# Patient Record
Sex: MALE | Race: WHITE | Employment: OTHER | URBAN - METROPOLITAN AREA
[De-identification: names, ages, dates, MRNs, and addresses within clinical notes are randomized per-mention and may not be internally consistent; named-entity substitution may affect disease eponyms.]

---

## 2017-09-07 PROBLEM — I48.4 ATYPICAL ATRIAL FLUTTER (HCC): Status: ACTIVE | Noted: 2017-09-07

## 2017-09-07 PROBLEM — E66.09 NON MORBID OBESITY DUE TO EXCESS CALORIES: Status: ACTIVE | Noted: 2017-09-07

## 2017-10-03 PROBLEM — E87.5 HYPERKALEMIA: Status: ACTIVE | Noted: 2017-10-03

## 2017-10-03 PROBLEM — I49.5 SINUS NODE DYSFUNCTION (HCC): Status: ACTIVE | Noted: 2017-10-03

## 2018-01-12 PROBLEM — E87.5 HYPERKALEMIA: Status: RESOLVED | Noted: 2017-10-03 | Resolved: 2018-01-12

## 2018-03-19 ENCOUNTER — HOSPITAL ENCOUNTER (OUTPATIENT)
Age: 63
Discharge: HOME | End: 2018-03-19
Payer: COMMERCIAL

## 2018-03-19 DIAGNOSIS — E11.43 TYPE 2 DIABETES MELLITUS WITH DIABETIC AUTONOMIC NEUROPATHY, WITH LONG-TERM CURRENT USE OF INSULIN (HCC): ICD-10-CM

## 2018-03-19 DIAGNOSIS — Z79.4 TYPE 2 DIABETES MELLITUS WITH DIABETIC AUTONOMIC NEUROPATHY, WITH LONG-TERM CURRENT USE OF INSULIN (HCC): ICD-10-CM

## 2018-03-19 LAB
CREATININE URINE: 27 MG/DL (ref 40–278)
MICROALBUMIN UR-MCNC: <12 MG/L
MICROALBUMIN/CREAT UR-RTO: ABNORMAL (ref 0–30)

## 2018-03-19 PROCEDURE — 82044 UR ALBUMIN SEMIQUANTITATIVE: CPT

## 2018-03-19 PROCEDURE — 82570 ASSAY OF URINE CREATININE: CPT

## 2018-04-11 ENCOUNTER — OFFICE VISIT (OUTPATIENT)
Dept: INTERNAL MEDICINE | Age: 63
End: 2018-04-11
Payer: COMMERCIAL

## 2018-04-11 VITALS
TEMPERATURE: 98 F | SYSTOLIC BLOOD PRESSURE: 137 MMHG | DIASTOLIC BLOOD PRESSURE: 62 MMHG | HEIGHT: 67 IN | BODY MASS INDEX: 38.5 KG/M2 | WEIGHT: 245.3 LBS | RESPIRATION RATE: 16 BRPM | HEART RATE: 62 BPM

## 2018-04-11 DIAGNOSIS — J98.4 RESTRICTIVE LUNG DISEASE SECONDARY TO OBESITY: ICD-10-CM

## 2018-04-11 DIAGNOSIS — E66.9 RESTRICTIVE LUNG DISEASE SECONDARY TO OBESITY: ICD-10-CM

## 2018-04-11 DIAGNOSIS — Z79.4 TYPE 2 DIABETES MELLITUS WITH DIABETIC AUTONOMIC NEUROPATHY, WITH LONG-TERM CURRENT USE OF INSULIN (HCC): Primary | ICD-10-CM

## 2018-04-11 DIAGNOSIS — E03.9 ACQUIRED HYPOTHYROIDISM: ICD-10-CM

## 2018-04-11 DIAGNOSIS — E11.43 TYPE 2 DIABETES MELLITUS WITH DIABETIC AUTONOMIC NEUROPATHY, WITH LONG-TERM CURRENT USE OF INSULIN (HCC): Primary | ICD-10-CM

## 2018-04-11 DIAGNOSIS — I10 ESSENTIAL HYPERTENSION: ICD-10-CM

## 2018-04-11 LAB
GLUCOSE BLD-MCNC: 207 MG/DL
HBA1C MFR BLD: 6.5 %

## 2018-04-11 PROCEDURE — 82962 GLUCOSE BLOOD TEST: CPT

## 2018-04-11 PROCEDURE — 99212 OFFICE O/P EST SF 10 MIN: CPT

## 2018-04-11 PROCEDURE — 83036 HEMOGLOBIN GLYCOSYLATED A1C: CPT

## 2018-04-11 PROCEDURE — 99213 OFFICE O/P EST LOW 20 MIN: CPT

## 2018-04-11 RX ORDER — ATORVASTATIN CALCIUM 40 MG/1
40 TABLET, FILM COATED ORAL DAILY
Qty: 30 TABLET | Refills: 3 | Status: SHIPPED | OUTPATIENT
Start: 2018-04-11 | End: 2018-05-18 | Stop reason: SDUPTHER

## 2018-04-11 RX ORDER — FERROUS SULFATE 325(65) MG
325 TABLET ORAL
Qty: 30 TABLET | Refills: 3 | Status: SHIPPED | OUTPATIENT
Start: 2018-04-11 | End: 2019-05-03 | Stop reason: SDUPTHER

## 2018-04-11 RX ORDER — GABAPENTIN 100 MG/1
300 CAPSULE ORAL 2 TIMES DAILY
Qty: 60 CAPSULE | Refills: 3 | Status: SHIPPED | OUTPATIENT
Start: 2018-04-11 | End: 2018-04-11 | Stop reason: CLARIF

## 2018-04-11 RX ORDER — GABAPENTIN 100 MG/1
100 CAPSULE ORAL 2 TIMES DAILY
Qty: 60 CAPSULE | Refills: 3 | Status: SHIPPED | OUTPATIENT
Start: 2018-04-11 | End: 2019-01-22 | Stop reason: SINTOL

## 2018-04-11 RX ORDER — GLUCOSAMINE HCL/CHONDROITIN SU 500-400 MG
CAPSULE ORAL
Qty: 100 STRIP | Refills: 3 | Status: SHIPPED | OUTPATIENT
Start: 2018-04-11 | End: 2018-08-27 | Stop reason: SDUPTHER

## 2018-04-11 RX ORDER — LISINOPRIL 20 MG/1
TABLET ORAL
Qty: 30 TABLET | Refills: 3 | Status: SHIPPED | OUTPATIENT
Start: 2018-04-11 | End: 2018-05-18 | Stop reason: SDUPTHER

## 2018-04-11 RX ORDER — HYDROCHLOROTHIAZIDE 25 MG/1
TABLET ORAL
Qty: 30 TABLET | Refills: 3 | Status: SHIPPED | OUTPATIENT
Start: 2018-04-11 | End: 2018-08-27 | Stop reason: SDUPTHER

## 2018-04-11 RX ORDER — LEVOTHYROXINE SODIUM 0.1 MG/1
TABLET ORAL
Qty: 30 TABLET | Refills: 3 | Status: SHIPPED | OUTPATIENT
Start: 2018-04-11 | End: 2018-08-27 | Stop reason: SDUPTHER

## 2018-04-11 ASSESSMENT — ENCOUNTER SYMPTOMS
SHORTNESS OF BREATH: 0
EYES NEGATIVE: 1
GASTROINTESTINAL NEGATIVE: 1
COUGH: 0

## 2018-04-24 ENCOUNTER — NURSE ONLY (OUTPATIENT)
Dept: NON INVASIVE DIAGNOSTICS | Age: 63
End: 2018-04-24
Payer: COMMERCIAL

## 2018-04-24 ENCOUNTER — TELEPHONE (OUTPATIENT)
Dept: NON INVASIVE DIAGNOSTICS | Age: 63
End: 2018-04-24

## 2018-04-24 DIAGNOSIS — R00.1 BRADYCARDIA: ICD-10-CM

## 2018-04-24 DIAGNOSIS — I48.0 PAROXYSMAL ATRIAL FIBRILLATION (HCC): ICD-10-CM

## 2018-04-24 DIAGNOSIS — Z95.0 PACEMAKER: Primary | ICD-10-CM

## 2018-04-24 PROCEDURE — 93294 REM INTERROG EVL PM/LDLS PM: CPT | Performed by: INTERNAL MEDICINE

## 2018-04-24 PROCEDURE — 93296 REM INTERROG EVL PM/IDS: CPT | Performed by: INTERNAL MEDICINE

## 2018-05-18 DIAGNOSIS — E11.43 TYPE 2 DIABETES MELLITUS WITH DIABETIC AUTONOMIC NEUROPATHY, WITH LONG-TERM CURRENT USE OF INSULIN (HCC): ICD-10-CM

## 2018-05-18 DIAGNOSIS — I10 ESSENTIAL HYPERTENSION: ICD-10-CM

## 2018-05-18 DIAGNOSIS — Z79.4 TYPE 2 DIABETES MELLITUS WITH DIABETIC AUTONOMIC NEUROPATHY, WITH LONG-TERM CURRENT USE OF INSULIN (HCC): ICD-10-CM

## 2018-05-18 RX ORDER — LISINOPRIL 20 MG/1
TABLET ORAL
Qty: 90 TABLET | Refills: 0 | Status: SHIPPED | OUTPATIENT
Start: 2018-05-18 | End: 2018-08-09 | Stop reason: SDUPTHER

## 2018-05-18 RX ORDER — ATORVASTATIN CALCIUM 40 MG/1
40 TABLET, FILM COATED ORAL DAILY
Qty: 90 TABLET | Refills: 0 | Status: SHIPPED | OUTPATIENT
Start: 2018-05-18 | End: 2018-08-27 | Stop reason: SDUPTHER

## 2018-06-22 ENCOUNTER — OFFICE VISIT (OUTPATIENT)
Dept: PULMONOLOGY | Age: 63
End: 2018-06-22
Payer: MEDICARE

## 2018-06-22 VITALS
WEIGHT: 245 LBS | DIASTOLIC BLOOD PRESSURE: 63 MMHG | SYSTOLIC BLOOD PRESSURE: 136 MMHG | HEART RATE: 77 BPM | OXYGEN SATURATION: 95 % | HEIGHT: 67 IN | RESPIRATION RATE: 19 BRPM | BODY MASS INDEX: 38.45 KG/M2 | TEMPERATURE: 98.2 F

## 2018-06-22 DIAGNOSIS — G47.33 OSA (OBSTRUCTIVE SLEEP APNEA): Primary | ICD-10-CM

## 2018-06-22 DIAGNOSIS — J98.4 RESTRICTIVE LUNG DISEASE SECONDARY TO OBESITY: ICD-10-CM

## 2018-06-22 DIAGNOSIS — E66.09 CLASS 1 OBESITY DUE TO EXCESS CALORIES WITH SERIOUS COMORBIDITY IN ADULT, UNSPECIFIED BMI: ICD-10-CM

## 2018-06-22 DIAGNOSIS — E66.9 RESTRICTIVE LUNG DISEASE SECONDARY TO OBESITY: ICD-10-CM

## 2018-06-22 DIAGNOSIS — R91.8 LUNG NODULES: ICD-10-CM

## 2018-06-22 LAB
DLCO %PRED: NORMAL
DLCO/VA %PRED: NORMAL
DLCO: NORMAL ML/MMHG SEC
FEF 25-75% PRE PRED: NORMAL
FEF 25-75%-PRE: 2.87
FEV1 %PRED-PRE: NORMAL
FEV1/FVC PRED: NORMAL
FEV1/FVC: 87 %
FEV1: 2 LITERS
FEV3 PRE: 2.27
FVC %PRED-PRE: NORMAL
FVC: 2.31 LITERS
MEP: NORMAL
MIP: NORMAL
PEF %PRED-PRE: NORMAL
PEF-PRE: NORMAL L/SEC
TLC %PRED: NORMAL
TLC: NORMAL LITERS

## 2018-06-22 PROCEDURE — 3017F COLORECTAL CA SCREEN DOC REV: CPT | Performed by: INTERNAL MEDICINE

## 2018-06-22 PROCEDURE — 1036F TOBACCO NON-USER: CPT | Performed by: INTERNAL MEDICINE

## 2018-06-22 PROCEDURE — G8417 CALC BMI ABV UP PARAM F/U: HCPCS | Performed by: INTERNAL MEDICINE

## 2018-06-22 PROCEDURE — 94010 BREATHING CAPACITY TEST: CPT | Performed by: INTERNAL MEDICINE

## 2018-06-22 PROCEDURE — 99214 OFFICE O/P EST MOD 30 MIN: CPT | Performed by: INTERNAL MEDICINE

## 2018-06-22 PROCEDURE — 99213 OFFICE O/P EST LOW 20 MIN: CPT | Performed by: INTERNAL MEDICINE

## 2018-06-22 PROCEDURE — G8427 DOCREV CUR MEDS BY ELIG CLIN: HCPCS | Performed by: INTERNAL MEDICINE

## 2018-06-22 ASSESSMENT — PULMONARY FUNCTION TESTS
FEV1: 2.00
FEV1/FVC: 87
FVC: 2.31

## 2018-07-11 ENCOUNTER — TELEPHONE (OUTPATIENT)
Dept: INTERNAL MEDICINE | Age: 63
End: 2018-07-11

## 2018-07-12 RX ORDER — MELATONIN
Qty: 30 TABLET | Refills: 0 | Status: SHIPPED | OUTPATIENT
Start: 2018-07-12 | End: 2019-05-03 | Stop reason: SDUPTHER

## 2018-07-25 ENCOUNTER — OFFICE VISIT (OUTPATIENT)
Dept: NON INVASIVE DIAGNOSTICS | Age: 63
End: 2018-07-25
Payer: MEDICARE

## 2018-07-25 VITALS
DIASTOLIC BLOOD PRESSURE: 60 MMHG | HEART RATE: 86 BPM | SYSTOLIC BLOOD PRESSURE: 120 MMHG | HEIGHT: 67 IN | BODY MASS INDEX: 38.3 KG/M2 | WEIGHT: 244 LBS | RESPIRATION RATE: 18 BRPM

## 2018-07-25 DIAGNOSIS — I49.5 SINUS NODE DYSFUNCTION (HCC): ICD-10-CM

## 2018-07-25 DIAGNOSIS — I10 ESSENTIAL HYPERTENSION: ICD-10-CM

## 2018-07-25 DIAGNOSIS — I48.19 PERSISTENT ATRIAL FIBRILLATION (HCC): ICD-10-CM

## 2018-07-25 DIAGNOSIS — Z95.0 PACEMAKER: Primary | ICD-10-CM

## 2018-07-25 PROCEDURE — 1036F TOBACCO NON-USER: CPT | Performed by: INTERNAL MEDICINE

## 2018-07-25 PROCEDURE — 93280 PM DEVICE PROGR EVAL DUAL: CPT | Performed by: INTERNAL MEDICINE

## 2018-07-25 PROCEDURE — 3017F COLORECTAL CA SCREEN DOC REV: CPT | Performed by: INTERNAL MEDICINE

## 2018-07-25 PROCEDURE — G8417 CALC BMI ABV UP PARAM F/U: HCPCS | Performed by: INTERNAL MEDICINE

## 2018-07-25 PROCEDURE — 1123F ACP DISCUSS/DSCN MKR DOCD: CPT | Performed by: INTERNAL MEDICINE

## 2018-07-25 PROCEDURE — G8427 DOCREV CUR MEDS BY ELIG CLIN: HCPCS | Performed by: INTERNAL MEDICINE

## 2018-07-25 PROCEDURE — 1101F PT FALLS ASSESS-DOCD LE1/YR: CPT | Performed by: INTERNAL MEDICINE

## 2018-07-25 PROCEDURE — 4040F PNEUMOC VAC/ADMIN/RCVD: CPT | Performed by: INTERNAL MEDICINE

## 2018-07-25 PROCEDURE — 99213 OFFICE O/P EST LOW 20 MIN: CPT | Performed by: INTERNAL MEDICINE

## 2018-07-25 NOTE — PROGRESS NOTES
Electrophysiology Outpatient Progress Note    Cirilo Pickens  1955  Date of Service: 7/25/2018  Referring Provider/PCP: Brandon Levi MD  Primary Electrophysiologist: Viraj Lanier MD, [facility]     Chief Complaint: s/p Medtronic dual chamber MRI conditional PPM 10/3/2017, follow up of AFib    SUBJECTIVE: Cirilo Pickens presents to the office today for a six month follow up . He states he feels overall well. His device site looks well healed and free from infection or erosion - offers no complaints from a device POV. He continues to follow remotely. Today he denies any chest pain, SOB, palpitations or syncope. He interrogation today showed 1 episode of AF lasting 2 hours - he continues on Eliquis for stroke prevention. He is feeling well without active complaints.       Patient Active Problem List    Diagnosis Date Noted    Bradycardia     Pacemaker      Overview Note:     DOI 10/3/2017  Medtronic; dual chamber; MRI conditional   Indication: Sinus node dysfunction       Sinus node dysfunction ([address] 10/03/2017    Atypical atrial flutter ([address] 09/07/2017    Non morbid obesity due to excess calories 09/07/2017    Paroxysmal atrial fibrillation (HCC)     Encephalopathy     C. difficile colitis     Restrictive lung disease secondary to obesity 07/25/2016    NIKO (obstructive sleep apnea) 08/08/2013    Nocturnal hypoxemia due to obesity 08/08/2013    Diabetic neuropathy ([address] 08/01/2013    Lung nodules 04/15/2013    Arthritis of shoulder region, left 04/15/2013    OA (osteoarthritis of the spine) 04/15/2013     Overview Note:     Lumbar      S/P laminectomy with spinal fusion 04/15/2013     Overview Note:     [facility] at [facility], March 2012      Colorectal polyps 04/15/2013    Diverticula of colon 04/15/2013    HTN (hypertension) 05/04/2011    Hypothyroidism 05/04/2011    DM (diabetes mellitus) ([address] 01/31/2011    Obesity 01/31/2011    Hyperlipidemia 01/31/2011       Current Outpatient Eliquis  - CHADSVASC= 2  - not on ANY rate controlling agents  - Afib burden <1% thus far (see above) --- will continue to monitor on future device interrogations   - Re-education on importance of well controlled HTN (goal BP < 130/80), adequate weight control (goal BMI of < 27), adequate glucose control (goal hemoglobin A1c < 6.5), physical activity consisting of moderate cardiopulmonary exercise up to a goal of 250 min/wk, consider sleep study regarding a formal evaluation for sleep apnea, smoking cessation and limited ETOH intake. 3. Sinus node dysfunction  - see above  - s/p pacemaker     4. HTN  - controlled on today's visit   - continue current medications     5. DM  Lab Results   Component Value Date    LABA1C 6.5 04/11/2018     No results found for: EAG  - per PCP      6. Restrictive lung disease  - per pulmonary     7. Obesity   Body mass index is 38.22 kg/m². - recommend weight loss    Plan:     1. Continue Eliquis 5 mg BID   2. Risk factor modifications as discussed above   3. Remote transmission in three months   4. Follow-up in one year or sooner for symptoms    Advised patient to call the office regarding any questions or concerns.      Johnny Martinez MD, [facility]  Cardiac Electrophysiology  [facility]) Physicians  The [facility] V[facility] [place]: [place] [facility]  9:56 AM  7/25/2018

## 2018-08-09 DIAGNOSIS — I10 ESSENTIAL HYPERTENSION: ICD-10-CM

## 2018-08-10 DIAGNOSIS — E66.9 RESTRICTIVE LUNG DISEASE SECONDARY TO OBESITY: Primary | ICD-10-CM

## 2018-08-10 DIAGNOSIS — J98.4 RESTRICTIVE LUNG DISEASE SECONDARY TO OBESITY: Primary | ICD-10-CM

## 2018-08-10 RX ORDER — LISINOPRIL 20 MG/1
TABLET ORAL
Qty: 90 TABLET | Refills: 0 | Status: SHIPPED | OUTPATIENT
Start: 2018-08-10 | End: 2018-08-27 | Stop reason: SDUPTHER

## 2018-08-16 DIAGNOSIS — J42 CHRONIC BRONCHITIS, UNSPECIFIED CHRONIC BRONCHITIS TYPE (HCC): Primary | ICD-10-CM

## 2018-08-16 DIAGNOSIS — J98.4 RESTRICTIVE LUNG DISEASE SECONDARY TO OBESITY: ICD-10-CM

## 2018-08-16 DIAGNOSIS — E66.9 RESTRICTIVE LUNG DISEASE SECONDARY TO OBESITY: ICD-10-CM

## 2018-08-27 ENCOUNTER — OFFICE VISIT (OUTPATIENT)
Dept: INTERNAL MEDICINE | Age: 63
End: 2018-08-27
Payer: MEDICARE

## 2018-08-27 VITALS
DIASTOLIC BLOOD PRESSURE: 58 MMHG | RESPIRATION RATE: 18 BRPM | TEMPERATURE: 98.2 F | WEIGHT: 240 LBS | HEART RATE: 63 BPM | SYSTOLIC BLOOD PRESSURE: 126 MMHG | HEIGHT: 67 IN | BODY MASS INDEX: 37.67 KG/M2

## 2018-08-27 DIAGNOSIS — E11.43 TYPE 2 DIABETES MELLITUS WITH DIABETIC AUTONOMIC NEUROPATHY, WITH LONG-TERM CURRENT USE OF INSULIN (HCC): ICD-10-CM

## 2018-08-27 DIAGNOSIS — E03.9 ACQUIRED HYPOTHYROIDISM: ICD-10-CM

## 2018-08-27 DIAGNOSIS — Z79.4 TYPE 2 DIABETES MELLITUS WITH DIABETIC AUTONOMIC NEUROPATHY, WITH LONG-TERM CURRENT USE OF INSULIN (HCC): ICD-10-CM

## 2018-08-27 DIAGNOSIS — Z13.6 SCREENING FOR AAA (ABDOMINAL AORTIC ANEURYSM): Primary | ICD-10-CM

## 2018-08-27 DIAGNOSIS — I10 ESSENTIAL HYPERTENSION: ICD-10-CM

## 2018-08-27 DIAGNOSIS — J98.4 RESTRICTIVE LUNG DISEASE SECONDARY TO OBESITY: ICD-10-CM

## 2018-08-27 DIAGNOSIS — E66.9 RESTRICTIVE LUNG DISEASE SECONDARY TO OBESITY: ICD-10-CM

## 2018-08-27 PROCEDURE — 90732 PPSV23 VACC 2 YRS+ SUBQ/IM: CPT

## 2018-08-27 PROCEDURE — 2022F DILAT RTA XM EVC RTNOPTHY: CPT | Performed by: INTERNAL MEDICINE

## 2018-08-27 PROCEDURE — G0009 ADMIN PNEUMOCOCCAL VACCINE: HCPCS

## 2018-08-27 PROCEDURE — 1101F PT FALLS ASSESS-DOCD LE1/YR: CPT | Performed by: INTERNAL MEDICINE

## 2018-08-27 PROCEDURE — 6360000002 HC RX W HCPCS

## 2018-08-27 PROCEDURE — 1036F TOBACCO NON-USER: CPT | Performed by: INTERNAL MEDICINE

## 2018-08-27 PROCEDURE — 1123F ACP DISCUSS/DSCN MKR DOCD: CPT | Performed by: INTERNAL MEDICINE

## 2018-08-27 PROCEDURE — 99212 OFFICE O/P EST SF 10 MIN: CPT | Performed by: INTERNAL MEDICINE

## 2018-08-27 PROCEDURE — G8427 DOCREV CUR MEDS BY ELIG CLIN: HCPCS | Performed by: INTERNAL MEDICINE

## 2018-08-27 PROCEDURE — 3017F COLORECTAL CA SCREEN DOC REV: CPT | Performed by: INTERNAL MEDICINE

## 2018-08-27 PROCEDURE — G8417 CALC BMI ABV UP PARAM F/U: HCPCS | Performed by: INTERNAL MEDICINE

## 2018-08-27 PROCEDURE — 4040F PNEUMOC VAC/ADMIN/RCVD: CPT | Performed by: INTERNAL MEDICINE

## 2018-08-27 PROCEDURE — 3044F HG A1C LEVEL LT 7.0%: CPT | Performed by: INTERNAL MEDICINE

## 2018-08-27 PROCEDURE — 99214 OFFICE O/P EST MOD 30 MIN: CPT | Performed by: INTERNAL MEDICINE

## 2018-08-27 RX ORDER — GLUCOSAMINE HCL/CHONDROITIN SU 500-400 MG
CAPSULE ORAL
Qty: 200 STRIP | Refills: 0 | Status: SHIPPED | OUTPATIENT
Start: 2018-08-27 | End: 2019-01-22 | Stop reason: SDUPTHER

## 2018-08-27 RX ORDER — LANCETS 30 GAUGE
EACH MISCELLANEOUS
Qty: 200 EACH | Refills: 0 | Status: SHIPPED | OUTPATIENT
Start: 2018-08-27 | End: 2019-01-22 | Stop reason: SDUPTHER

## 2018-08-27 RX ORDER — ATORVASTATIN CALCIUM 40 MG/1
40 TABLET, FILM COATED ORAL DAILY
Qty: 90 TABLET | Refills: 0 | Status: SHIPPED | OUTPATIENT
Start: 2018-08-27 | End: 2018-11-28 | Stop reason: SDUPTHER

## 2018-08-27 RX ORDER — LISINOPRIL 20 MG/1
TABLET ORAL
Qty: 90 TABLET | Refills: 0 | Status: SHIPPED | OUTPATIENT
Start: 2018-08-27 | End: 2018-11-28 | Stop reason: SDUPTHER

## 2018-08-27 RX ORDER — LEVOTHYROXINE SODIUM 0.1 MG/1
TABLET ORAL
Qty: 90 TABLET | Refills: 0 | Status: SHIPPED | OUTPATIENT
Start: 2018-08-27 | End: 2018-11-27 | Stop reason: SDUPTHER

## 2018-08-27 RX ORDER — HYDROCHLOROTHIAZIDE 25 MG/1
TABLET ORAL
Qty: 90 TABLET | Refills: 0 | Status: SHIPPED | OUTPATIENT
Start: 2018-08-27 | End: 2018-11-27 | Stop reason: SDUPTHER

## 2018-08-27 NOTE — LETTER
August 27, 2018     [address] [place] [address] [address] [address]      Dear Moi Espino:    Thank you for enrolling in [address] Please follow the instructions below to securely access your online medical record. [employer] allows you to send messages to your doctor, view your test results, renew your prescriptions, schedule appointments, and more. How Do I Sign Up? 1. In your Internet browser, go to [email or web address]  2. Click on the Sign Up Now link in the Sign In box. You will see the New Member Sign Up page. 3. Enter your [employer] Access Code exactly as it appears below. You will not need to use this code after youve completed the sign-up process. If you do not sign up before the expiration date, you must request a new code. [employer] Access Code: [email or web address]  Activation Code Expiration: 10/26/2018  4:02 PM  Enter your Social Security Number ([id number]) and Date of Birth (mm/dd/yyyy) as indicated and click Submit. You will be taken to the next sign-up page. 4. Create a [employer] ID. This will be your [employer] login ID and cannot be changed, so think of one that is secure and easy to remember. 5. Create a [employer] password. You can change your password at any time. 6. Enter your Password Reset Question and Answer. This can be used at a later time if you forget your password. 7. Enter your e-mail address. You will receive e-mail notification when new information is available in [address] 8. Click Sign Up. You can now view your medical record. Additional Information  If you have questions, please contact the physician practice where you receive care. Remember, [employer] is NOT to be used for urgent needs. For medical emergencies, dial 911. For questions regarding your [employer] account call [phone]. If you have a clinical question, please call your doctor's office.

## 2018-08-27 NOTE — PROGRESS NOTES
[facility]  Internal Medicine Clinic    Attending Physician Statement  I have discussed the case, including pertinent history and exam findings with the resident. I have seen and examined the patient and the key elements of the encounter have been performed by me. I agree with the assessment, plan and orders as documented by the resident. I have reviewed all pertinent PMHx, PSHx, FamHx, SocialHx, medications, and allergies and updated history as appropriate. Patient here for routine follow up of medical problems. DM - 70/30 - well controlled - with A1c 6.5 - continue same and avoid hypoglycemia    HTN - well controlled - continue same     NIKO - not using CPAP but using the O2    Afib - on eliquis - chadsvasc and hasbled still favors anticoagulation     65-75 Men  -One time AAA screening ultrasound for smokers >5 pack's lifetime Yes will order   -ETOH misuse No  -Colon cancer screening done 2012 due 2022  -recommended exercise for falls prevention Yes  -one-time screening for HCV infection to adults born between 1945 and 1965 - negative 2017    Health Maintenance Due   Topic Date Due    AAA screen  1955    Shingles Vaccine (1 of 2 - 2 Dose Series) 06/28/2003    Pneumococcal low/med risk (2 of 2 - PPSV23) 06/28/2018    TSH testing  08/09/2018    Lipid screen  08/09/2018     Remainder of medical problems as per resident note.   Jaya Cm D.O.  8/27/2018 3:04 PM
non-tender. non-distended BS normal. No masses, organomegaly, no guarding rebound or rigidity.   Extremities: No edema, Peripheral pulses palpable 2/4    ASSESSMENT/PLAN:  HTN  Well controlled, cont current regimen    DM  Check A1c next appoint    AAA screen    Back lump  Surgical referral if bothered    NIKO  F/u with , on NC oxy nightly    Afib  On Eliquis, also noted on ASA 81 mg, f/u EP    PPSV 23 also given for this visit      I have reviewed my findings and recommendations with Messi Rocha and Dr Torin Josue MD PGY-1   8/27/2018 3:53 PM

## 2018-08-27 NOTE — PATIENT INSTRUCTIONS
the medicines you take. Ask your doctor or pharmacist whether the medicines you take can affect your balance. Sleeping pills or sedatives can affect your balance. · Limit the amount of alcohol you drink. Alcohol can impair your balance and other senses. · Ask your doctor whether calluses or corns on your feet need to be removed. If you wear loose-fitting shoes because of calluses or corns, you can lose your balance and fall. · Talk to your doctor if you have numbness in your feet. Preventing falls at home  · Remove raised doorway thresholds, throw rugs, and clutter. Repair loose carpet or raised areas in the floor. · Move furniture and electrical cords to keep them out of walking paths. · Use nonskid floor wax, and wipe up spills right away, especially on ceramic tile floors. · If you use a walker or cane, put rubber tips on it. If you use crutches, clean the bottoms of them regularly with an abrasive pad, such as steel wool. · Keep your house well lit, especially Tavia Golas, and outside walkways. Use night-lights in areas such as hallways and bathrooms. Add extra light switches or use remote switches (such as switches that go on or off when you clap your hands) to make it easier to turn lights on if you have to get up during the night. · Install sturdy handrails on stairways. · Move items in your cabinets so that the things you use a lot are on the lower shelves (about waist level). · Keep a cordless phone and a flashlight with new batteries by your bed. If possible, put a phone in each of the main rooms of your house, or carry a cell phone in case you fall and cannot reach a phone. Or, you can wear a device around your neck or wrist. You push a button that sends a signal for help. · Wear low-heeled shoes that fit well and give your feet good support. Use footwear with nonskid soles. Check the heels and soles of your shoes for wear. Repair or replace worn heels or soles.   · Do not wear socks

## 2018-08-28 ENCOUNTER — TELEPHONE (OUTPATIENT)
Dept: INTERNAL MEDICINE | Age: 63
End: 2018-08-28

## 2018-08-28 NOTE — TELEPHONE ENCOUNTER
Called to let patient know of US appointment 9-5-18 @ 10:30 (arrive @ 10), NPO after midnight. Card sent to patient with all information.

## 2018-09-04 ENCOUNTER — TELEPHONE (OUTPATIENT)
Dept: INTERNAL MEDICINE | Age: 63
End: 2018-09-04

## 2018-09-05 ENCOUNTER — HOSPITAL ENCOUNTER (OUTPATIENT)
Dept: ULTRASOUND IMAGING | Age: 63
Discharge: HOME | End: 2018-09-07
Payer: MEDICARE

## 2018-09-05 ENCOUNTER — HOSPITAL ENCOUNTER (OUTPATIENT)
Age: 63
Discharge: HOME | End: 2018-09-05
Payer: MEDICARE

## 2018-09-05 DIAGNOSIS — Z13.6 SCREENING FOR AAA (ABDOMINAL AORTIC ANEURYSM): ICD-10-CM

## 2018-09-05 DIAGNOSIS — Z79.4 TYPE 2 DIABETES MELLITUS WITH DIABETIC AUTONOMIC NEUROPATHY, WITH LONG-TERM CURRENT USE OF INSULIN (HCC): ICD-10-CM

## 2018-09-05 DIAGNOSIS — E03.9 ACQUIRED HYPOTHYROIDISM: ICD-10-CM

## 2018-09-05 DIAGNOSIS — E11.43 TYPE 2 DIABETES MELLITUS WITH DIABETIC AUTONOMIC NEUROPATHY, WITH LONG-TERM CURRENT USE OF INSULIN (HCC): ICD-10-CM

## 2018-09-05 LAB
CHOLESTEROL, TOTAL: 125 MG/DL (ref 0–199)
HDLC SERPL-MCNC: 31 MG/DL
LDL CHOLESTEROL CALCULATED: 69 MG/DL (ref 0–99)
TRIGL SERPL-MCNC: 127 MG/DL (ref 0–149)
TSH SERPL DL<=0.05 MIU/L-ACNC: 6.65 UIU/ML (ref 0.27–4.2)
VLDLC SERPL CALC-MCNC: 25 MG/DL

## 2018-09-05 PROCEDURE — 80061 LIPID PANEL: CPT

## 2018-09-05 PROCEDURE — 84443 ASSAY THYROID STIM HORMONE: CPT

## 2018-09-05 PROCEDURE — 36415 COLL VENOUS BLD VENIPUNCTURE: CPT

## 2018-09-05 PROCEDURE — 76775 US EXAM ABDO BACK WALL LIM: CPT

## 2018-10-31 ENCOUNTER — NURSE ONLY (OUTPATIENT)
Dept: NON INVASIVE DIAGNOSTICS | Age: 63
End: 2018-10-31
Payer: MEDICARE

## 2018-10-31 DIAGNOSIS — Z95.0 PACEMAKER: Primary | ICD-10-CM

## 2018-10-31 DIAGNOSIS — R00.1 BRADYCARDIA: ICD-10-CM

## 2018-10-31 PROCEDURE — 93294 REM INTERROG EVL PM/LDLS PM: CPT | Performed by: INTERNAL MEDICINE

## 2018-10-31 PROCEDURE — 93296 REM INTERROG EVL PM/IDS: CPT | Performed by: INTERNAL MEDICINE

## 2018-11-06 ENCOUNTER — TELEPHONE (OUTPATIENT)
Dept: NON INVASIVE DIAGNOSTICS | Age: 63
End: 2018-11-06

## 2018-11-06 NOTE — PROGRESS NOTES
I have personally reviewed the remote pacemaker interrogation data. Please see the attached report for details. Stable battery and lead parameters. No clinically significant arrhythmias noted  9 Brief NSVT noted for max of 9beats.     - Continue follow up as recommended    Paulina Caicedo MD, [facility]  The Heart and Vascular [place]: [place] [facility]  9:42 AM  11/6/2018

## 2018-11-13 ENCOUNTER — TELEPHONE (OUTPATIENT)
Dept: ADMINISTRATIVE | Age: 63
End: 2018-11-13

## 2018-11-24 DIAGNOSIS — Z79.4 TYPE 2 DIABETES MELLITUS WITH DIABETIC AUTONOMIC NEUROPATHY, WITH LONG-TERM CURRENT USE OF INSULIN (HCC): ICD-10-CM

## 2018-11-24 DIAGNOSIS — E11.43 TYPE 2 DIABETES MELLITUS WITH DIABETIC AUTONOMIC NEUROPATHY, WITH LONG-TERM CURRENT USE OF INSULIN (HCC): ICD-10-CM

## 2018-11-27 DIAGNOSIS — Z79.4 TYPE 2 DIABETES MELLITUS WITH DIABETIC AUTONOMIC NEUROPATHY, WITH LONG-TERM CURRENT USE OF INSULIN (HCC): ICD-10-CM

## 2018-11-27 DIAGNOSIS — I10 ESSENTIAL HYPERTENSION: ICD-10-CM

## 2018-11-27 DIAGNOSIS — E11.43 TYPE 2 DIABETES MELLITUS WITH DIABETIC AUTONOMIC NEUROPATHY, WITH LONG-TERM CURRENT USE OF INSULIN (HCC): ICD-10-CM

## 2018-11-27 DIAGNOSIS — E03.9 ACQUIRED HYPOTHYROIDISM: ICD-10-CM

## 2018-11-29 RX ORDER — ATORVASTATIN CALCIUM 40 MG/1
40 TABLET, FILM COATED ORAL DAILY
Qty: 90 TABLET | Refills: 0 | Status: SHIPPED | OUTPATIENT
Start: 2018-11-29 | End: 2019-01-22 | Stop reason: SDUPTHER

## 2018-11-29 RX ORDER — HYDROCHLOROTHIAZIDE 25 MG/1
TABLET ORAL
Qty: 90 TABLET | Refills: 0 | Status: SHIPPED | OUTPATIENT
Start: 2018-11-29 | End: 2019-01-22 | Stop reason: SDUPTHER

## 2018-11-29 RX ORDER — LEVOTHYROXINE SODIUM 0.1 MG/1
TABLET ORAL
Qty: 90 TABLET | Refills: 0 | Status: SHIPPED | OUTPATIENT
Start: 2018-11-29 | End: 2019-01-22 | Stop reason: SDUPTHER

## 2018-11-29 RX ORDER — LISINOPRIL 20 MG/1
TABLET ORAL
Qty: 90 TABLET | Refills: 0 | Status: SHIPPED | OUTPATIENT
Start: 2018-11-29 | End: 2019-01-22 | Stop reason: SDUPTHER

## 2019-01-22 ENCOUNTER — OFFICE VISIT (OUTPATIENT)
Dept: INTERNAL MEDICINE | Age: 64
End: 2019-01-22
Payer: MEDICARE

## 2019-01-22 VITALS
TEMPERATURE: 98.1 F | WEIGHT: 249.8 LBS | DIASTOLIC BLOOD PRESSURE: 70 MMHG | RESPIRATION RATE: 18 BRPM | HEIGHT: 67 IN | BODY MASS INDEX: 39.21 KG/M2 | HEART RATE: 62 BPM | SYSTOLIC BLOOD PRESSURE: 128 MMHG

## 2019-01-22 DIAGNOSIS — Z79.4 TYPE 2 DIABETES MELLITUS WITH DIABETIC AUTONOMIC NEUROPATHY, WITH LONG-TERM CURRENT USE OF INSULIN (HCC): ICD-10-CM

## 2019-01-22 DIAGNOSIS — E03.9 HYPOTHYROIDISM, UNSPECIFIED TYPE: Primary | ICD-10-CM

## 2019-01-22 DIAGNOSIS — I10 ESSENTIAL HYPERTENSION: ICD-10-CM

## 2019-01-22 DIAGNOSIS — E11.43 TYPE 2 DIABETES MELLITUS WITH DIABETIC AUTONOMIC NEUROPATHY, WITH LONG-TERM CURRENT USE OF INSULIN (HCC): ICD-10-CM

## 2019-01-22 DIAGNOSIS — E03.9 ACQUIRED HYPOTHYROIDISM: ICD-10-CM

## 2019-01-22 LAB — HBA1C MFR BLD: 7.8 %

## 2019-01-22 PROCEDURE — 2022F DILAT RTA XM EVC RTNOPTHY: CPT | Performed by: INTERNAL MEDICINE

## 2019-01-22 PROCEDURE — 3045F PR MOST RECENT HEMOGLOBIN A1C LEVEL 7.0-9.0%: CPT | Performed by: INTERNAL MEDICINE

## 2019-01-22 PROCEDURE — 1123F ACP DISCUSS/DSCN MKR DOCD: CPT | Performed by: INTERNAL MEDICINE

## 2019-01-22 PROCEDURE — G8427 DOCREV CUR MEDS BY ELIG CLIN: HCPCS | Performed by: INTERNAL MEDICINE

## 2019-01-22 PROCEDURE — 3017F COLORECTAL CA SCREEN DOC REV: CPT | Performed by: INTERNAL MEDICINE

## 2019-01-22 PROCEDURE — 1101F PT FALLS ASSESS-DOCD LE1/YR: CPT | Performed by: INTERNAL MEDICINE

## 2019-01-22 PROCEDURE — G8417 CALC BMI ABV UP PARAM F/U: HCPCS | Performed by: INTERNAL MEDICINE

## 2019-01-22 PROCEDURE — 1036F TOBACCO NON-USER: CPT | Performed by: INTERNAL MEDICINE

## 2019-01-22 PROCEDURE — G8484 FLU IMMUNIZE NO ADMIN: HCPCS | Performed by: INTERNAL MEDICINE

## 2019-01-22 PROCEDURE — 99214 OFFICE O/P EST MOD 30 MIN: CPT | Performed by: INTERNAL MEDICINE

## 2019-01-22 PROCEDURE — 4040F PNEUMOC VAC/ADMIN/RCVD: CPT | Performed by: INTERNAL MEDICINE

## 2019-01-22 PROCEDURE — 99212 OFFICE O/P EST SF 10 MIN: CPT | Performed by: INTERNAL MEDICINE

## 2019-01-22 PROCEDURE — 83036 HEMOGLOBIN GLYCOSYLATED A1C: CPT | Performed by: INTERNAL MEDICINE

## 2019-01-22 RX ORDER — LANCETS 30 GAUGE
EACH MISCELLANEOUS
Qty: 200 EACH | Refills: 0 | Status: SHIPPED | OUTPATIENT
Start: 2019-01-22 | End: 2019-05-03 | Stop reason: SDUPTHER

## 2019-01-22 RX ORDER — ATORVASTATIN CALCIUM 40 MG/1
40 TABLET, FILM COATED ORAL DAILY
Qty: 90 TABLET | Refills: 3 | Status: SHIPPED | OUTPATIENT
Start: 2019-01-22 | End: 2019-05-03 | Stop reason: SDUPTHER

## 2019-01-22 RX ORDER — LEVOTHYROXINE SODIUM 0.1 MG/1
TABLET ORAL
Qty: 90 TABLET | Refills: 0 | Status: SHIPPED | OUTPATIENT
Start: 2019-01-22 | End: 2019-04-01 | Stop reason: SDUPTHER

## 2019-01-22 RX ORDER — HYDROCHLOROTHIAZIDE 25 MG/1
TABLET ORAL
Qty: 90 TABLET | Refills: 3 | Status: SHIPPED | OUTPATIENT
Start: 2019-01-22 | End: 2019-05-03 | Stop reason: SDUPTHER

## 2019-01-22 RX ORDER — GLUCOSAMINE HCL/CHONDROITIN SU 500-400 MG
CAPSULE ORAL
Qty: 200 STRIP | Refills: 0 | Status: SHIPPED | OUTPATIENT
Start: 2019-01-22 | End: 2019-05-03 | Stop reason: SDUPTHER

## 2019-01-22 RX ORDER — LISINOPRIL 20 MG/1
TABLET ORAL
Qty: 90 TABLET | Refills: 3 | Status: SHIPPED | OUTPATIENT
Start: 2019-01-22 | End: 2019-05-03 | Stop reason: SDUPTHER

## 2019-01-22 ASSESSMENT — PATIENT HEALTH QUESTIONNAIRE - PHQ9
1. LITTLE INTEREST OR PLEASURE IN DOING THINGS: 1
SUM OF ALL RESPONSES TO PHQ QUESTIONS 1-9: 2
SUM OF ALL RESPONSES TO PHQ9 QUESTIONS 1 & 2: 2
2. FEELING DOWN, DEPRESSED OR HOPELESS: 1
SUM OF ALL RESPONSES TO PHQ QUESTIONS 1-9: 2

## 2019-01-24 ENCOUNTER — TELEPHONE (OUTPATIENT)
Dept: INTERNAL MEDICINE | Age: 64
End: 2019-01-24

## 2019-02-01 ENCOUNTER — NURSE ONLY (OUTPATIENT)
Dept: NON INVASIVE DIAGNOSTICS | Age: 64
End: 2019-02-01
Payer: MEDICARE

## 2019-02-01 DIAGNOSIS — I49.5 SINUS NODE DYSFUNCTION (HCC): ICD-10-CM

## 2019-02-01 DIAGNOSIS — Z95.0 PACEMAKER: Primary | ICD-10-CM

## 2019-02-01 DIAGNOSIS — I48.19 PERSISTENT ATRIAL FIBRILLATION (HCC): ICD-10-CM

## 2019-02-01 PROCEDURE — 93296 REM INTERROG EVL PM/IDS: CPT | Performed by: INTERNAL MEDICINE

## 2019-02-01 PROCEDURE — 93294 REM INTERROG EVL PM/LDLS PM: CPT | Performed by: INTERNAL MEDICINE

## 2019-02-05 ENCOUNTER — TELEPHONE (OUTPATIENT)
Dept: NON INVASIVE DIAGNOSTICS | Age: 64
End: 2019-02-05

## 2019-02-08 ENCOUNTER — HOSPITAL ENCOUNTER (OUTPATIENT)
Dept: CT IMAGING | Age: 64
Discharge: HOME | End: 2019-02-10
Payer: MEDICARE

## 2019-02-08 DIAGNOSIS — J98.4 RESTRICTIVE LUNG DISEASE SECONDARY TO OBESITY: ICD-10-CM

## 2019-02-08 DIAGNOSIS — G47.33 OSA (OBSTRUCTIVE SLEEP APNEA): ICD-10-CM

## 2019-02-08 DIAGNOSIS — E66.09 CLASS 1 OBESITY DUE TO EXCESS CALORIES WITH SERIOUS COMORBIDITY IN ADULT, UNSPECIFIED BMI: ICD-10-CM

## 2019-02-08 DIAGNOSIS — R91.8 LUNG NODULES: ICD-10-CM

## 2019-02-08 DIAGNOSIS — E66.9 RESTRICTIVE LUNG DISEASE SECONDARY TO OBESITY: ICD-10-CM

## 2019-02-08 PROCEDURE — 71250 CT THORAX DX C-: CPT

## 2019-02-21 DIAGNOSIS — E11.43 TYPE 2 DIABETES MELLITUS WITH DIABETIC AUTONOMIC NEUROPATHY, WITH LONG-TERM CURRENT USE OF INSULIN (HCC): ICD-10-CM

## 2019-02-21 DIAGNOSIS — I10 ESSENTIAL HYPERTENSION: ICD-10-CM

## 2019-02-21 DIAGNOSIS — E03.9 ACQUIRED HYPOTHYROIDISM: ICD-10-CM

## 2019-02-21 DIAGNOSIS — Z79.4 TYPE 2 DIABETES MELLITUS WITH DIABETIC AUTONOMIC NEUROPATHY, WITH LONG-TERM CURRENT USE OF INSULIN (HCC): ICD-10-CM

## 2019-02-22 ENCOUNTER — OFFICE VISIT (OUTPATIENT)
Dept: PULMONOLOGY | Age: 64
End: 2019-02-22
Payer: MEDICARE

## 2019-02-22 VITALS
HEIGHT: 66 IN | WEIGHT: 248 LBS | TEMPERATURE: 97.9 F | BODY MASS INDEX: 39.86 KG/M2 | DIASTOLIC BLOOD PRESSURE: 65 MMHG | HEART RATE: 83 BPM | SYSTOLIC BLOOD PRESSURE: 143 MMHG | OXYGEN SATURATION: 94 %

## 2019-02-22 DIAGNOSIS — Z95.0 PACEMAKER: ICD-10-CM

## 2019-02-22 DIAGNOSIS — E66.9 RESTRICTIVE LUNG DISEASE SECONDARY TO OBESITY: ICD-10-CM

## 2019-02-22 DIAGNOSIS — J98.4 RESTRICTIVE LUNG DISEASE SECONDARY TO OBESITY: ICD-10-CM

## 2019-02-22 DIAGNOSIS — G47.33 OSA (OBSTRUCTIVE SLEEP APNEA): Primary | ICD-10-CM

## 2019-02-22 LAB
EXPIRATORY TIME: 7.17 SEC
FEF 25-75% %PRED-PRE: 131 L/SEC
FEF 25-75% PRED: 2.42 L/SEC
FEF 25-75%-PRE: 3.18 L/SEC
FEV1 %PRED-PRE: 65 %
FEV1 PRED: 2.96 L
FEV1/FVC %PRED-PRE: 113 %
FEV1/FVC PRED: 77 %
FEV1/FVC: 87 %
FEV1: 1.93 L
FVC %PRED-PRE: 57 %
FVC PRED: 3.83 L
FVC: 2.21 L
PEF %PRED-PRE: 77 L/SEC
PEF PRED: 7.89 L/SEC
PEF-PRE: 6.13 L/SEC

## 2019-02-22 PROCEDURE — 1036F TOBACCO NON-USER: CPT | Performed by: INTERNAL MEDICINE

## 2019-02-22 PROCEDURE — 1101F PT FALLS ASSESS-DOCD LE1/YR: CPT | Performed by: INTERNAL MEDICINE

## 2019-02-22 PROCEDURE — 4040F PNEUMOC VAC/ADMIN/RCVD: CPT | Performed by: INTERNAL MEDICINE

## 2019-02-22 PROCEDURE — 99213 OFFICE O/P EST LOW 20 MIN: CPT | Performed by: INTERNAL MEDICINE

## 2019-02-22 PROCEDURE — G8427 DOCREV CUR MEDS BY ELIG CLIN: HCPCS | Performed by: INTERNAL MEDICINE

## 2019-02-22 PROCEDURE — G8484 FLU IMMUNIZE NO ADMIN: HCPCS | Performed by: INTERNAL MEDICINE

## 2019-02-22 PROCEDURE — 94010 BREATHING CAPACITY TEST: CPT | Performed by: INTERNAL MEDICINE

## 2019-02-22 PROCEDURE — 99214 OFFICE O/P EST MOD 30 MIN: CPT | Performed by: INTERNAL MEDICINE

## 2019-02-22 PROCEDURE — G8417 CALC BMI ABV UP PARAM F/U: HCPCS | Performed by: INTERNAL MEDICINE

## 2019-02-22 PROCEDURE — 3017F COLORECTAL CA SCREEN DOC REV: CPT | Performed by: INTERNAL MEDICINE

## 2019-02-22 PROCEDURE — 1123F ACP DISCUSS/DSCN MKR DOCD: CPT | Performed by: INTERNAL MEDICINE

## 2019-02-22 RX ORDER — HYDROCHLOROTHIAZIDE 25 MG/1
TABLET ORAL
Qty: 90 TABLET | Refills: 0 | Status: SHIPPED | OUTPATIENT
Start: 2019-02-22 | End: 2019-04-01 | Stop reason: SDUPTHER

## 2019-02-22 RX ORDER — LEVOTHYROXINE SODIUM 0.1 MG/1
TABLET ORAL
Qty: 90 TABLET | Refills: 0 | Status: SHIPPED | OUTPATIENT
Start: 2019-02-22 | End: 2019-05-03 | Stop reason: SDUPTHER

## 2019-02-22 ASSESSMENT — PULMONARY FUNCTION TESTS
FEV1/FVC_PREDICTED: 77
FVC: 2.21
FEV1/FVC_PERCENT_PREDICTED_PRE: 113
FEV1: 1.93
FVC_PERCENT_PREDICTED_PRE: 57
FEV1/FVC: 87
FVC_PREDICTED: 3.83
FEV1_PERCENT_PREDICTED_PRE: 65
FEV1_PREDICTED: 2.96

## 2019-03-04 ENCOUNTER — TELEPHONE (OUTPATIENT)
Dept: PULMONOLOGY | Age: 64
End: 2019-03-04

## 2019-03-25 ENCOUNTER — TELEPHONE (OUTPATIENT)
Dept: INTERNAL MEDICINE | Age: 64
End: 2019-03-25

## 2019-03-26 ENCOUNTER — HOSPITAL ENCOUNTER (OUTPATIENT)
Age: 64
Discharge: HOME | End: 2019-03-26
Payer: MEDICARE

## 2019-03-26 DIAGNOSIS — E03.9 HYPOTHYROIDISM, UNSPECIFIED TYPE: ICD-10-CM

## 2019-03-26 DIAGNOSIS — I10 ESSENTIAL HYPERTENSION: ICD-10-CM

## 2019-03-26 LAB
ANION GAP SERPL CALCULATED.3IONS-SCNC: 14 MMOL/L (ref 7–16)
BUN BLDV-MCNC: 24 MG/DL (ref 8–23)
CALCIUM SERPL-MCNC: 9 MG/DL (ref 8.6–10.2)
CHLORIDE BLD-SCNC: 99 MMOL/L (ref 98–107)
CO2: 26 MMOL/L (ref 22–29)
CREAT SERPL-MCNC: 1.1 MG/DL (ref 0.7–1.2)
GFR AFRICAN AMERICAN: >60
GFR NON-AFRICAN AMERICAN: >60 ML/MIN/1.73
GLUCOSE BLD-MCNC: 234 MG/DL (ref 74–99)
POTASSIUM SERPL-SCNC: 4.6 MMOL/L (ref 3.5–5)
SODIUM BLD-SCNC: 139 MMOL/L (ref 132–146)
T4 FREE: 1.34 NG/DL (ref 0.93–1.7)

## 2019-03-26 PROCEDURE — 84439 ASSAY OF FREE THYROXINE: CPT

## 2019-03-26 PROCEDURE — 36415 COLL VENOUS BLD VENIPUNCTURE: CPT

## 2019-03-26 PROCEDURE — 80048 BASIC METABOLIC PNL TOTAL CA: CPT

## 2019-04-01 ENCOUNTER — OFFICE VISIT (OUTPATIENT)
Dept: INTERNAL MEDICINE | Age: 64
End: 2019-04-01
Payer: MEDICARE

## 2019-04-01 VITALS
SYSTOLIC BLOOD PRESSURE: 139 MMHG | DIASTOLIC BLOOD PRESSURE: 71 MMHG | HEART RATE: 64 BPM | RESPIRATION RATE: 18 BRPM | WEIGHT: 246 LBS | HEIGHT: 67 IN | TEMPERATURE: 97.6 F | BODY MASS INDEX: 38.61 KG/M2

## 2019-04-01 DIAGNOSIS — I48.19 PERSISTENT ATRIAL FIBRILLATION (HCC): ICD-10-CM

## 2019-04-01 DIAGNOSIS — Z79.4 TYPE 2 DIABETES MELLITUS WITHOUT COMPLICATION, WITH LONG-TERM CURRENT USE OF INSULIN (HCC): ICD-10-CM

## 2019-04-01 DIAGNOSIS — E03.9 HYPOTHYROIDISM, UNSPECIFIED TYPE: ICD-10-CM

## 2019-04-01 DIAGNOSIS — D50.9 IRON DEFICIENCY ANEMIA, UNSPECIFIED IRON DEFICIENCY ANEMIA TYPE: Primary | ICD-10-CM

## 2019-04-01 DIAGNOSIS — G89.29 CHRONIC BILATERAL LOW BACK PAIN WITHOUT SCIATICA: ICD-10-CM

## 2019-04-01 DIAGNOSIS — E11.43 TYPE 2 DIABETES MELLITUS WITH DIABETIC AUTONOMIC NEUROPATHY, WITHOUT LONG-TERM CURRENT USE OF INSULIN (HCC): ICD-10-CM

## 2019-04-01 DIAGNOSIS — I10 ESSENTIAL HYPERTENSION: ICD-10-CM

## 2019-04-01 DIAGNOSIS — M54.50 CHRONIC BILATERAL LOW BACK PAIN WITHOUT SCIATICA: ICD-10-CM

## 2019-04-01 DIAGNOSIS — E11.9 TYPE 2 DIABETES MELLITUS WITHOUT COMPLICATION, WITH LONG-TERM CURRENT USE OF INSULIN (HCC): ICD-10-CM

## 2019-04-01 DIAGNOSIS — I49.5 SINUS NODE DYSFUNCTION (HCC): ICD-10-CM

## 2019-04-01 PROBLEM — E66.09 NON MORBID OBESITY DUE TO EXCESS CALORIES: Status: RESOLVED | Noted: 2017-09-07 | Resolved: 2019-04-01

## 2019-04-01 PROCEDURE — 99213 OFFICE O/P EST LOW 20 MIN: CPT | Performed by: INTERNAL MEDICINE

## 2019-04-01 PROCEDURE — 99214 OFFICE O/P EST MOD 30 MIN: CPT | Performed by: INTERNAL MEDICINE

## 2019-04-01 PROCEDURE — 1036F TOBACCO NON-USER: CPT | Performed by: INTERNAL MEDICINE

## 2019-04-01 PROCEDURE — 2022F DILAT RTA XM EVC RTNOPTHY: CPT | Performed by: INTERNAL MEDICINE

## 2019-04-01 PROCEDURE — G8417 CALC BMI ABV UP PARAM F/U: HCPCS | Performed by: INTERNAL MEDICINE

## 2019-04-01 PROCEDURE — 4040F PNEUMOC VAC/ADMIN/RCVD: CPT | Performed by: INTERNAL MEDICINE

## 2019-04-01 PROCEDURE — 3017F COLORECTAL CA SCREEN DOC REV: CPT | Performed by: INTERNAL MEDICINE

## 2019-04-01 PROCEDURE — G8427 DOCREV CUR MEDS BY ELIG CLIN: HCPCS | Performed by: INTERNAL MEDICINE

## 2019-04-01 PROCEDURE — 3045F PR MOST RECENT HEMOGLOBIN A1C LEVEL 7.0-9.0%: CPT | Performed by: INTERNAL MEDICINE

## 2019-04-01 PROCEDURE — 83036 HEMOGLOBIN GLYCOSYLATED A1C: CPT | Performed by: INTERNAL MEDICINE

## 2019-04-01 PROCEDURE — 1123F ACP DISCUSS/DSCN MKR DOCD: CPT | Performed by: INTERNAL MEDICINE

## 2019-04-01 RX ORDER — IBUPROFEN 600 MG/1
600 TABLET ORAL EVERY 8 HOURS PRN
Qty: 360 TABLET | Refills: 1 | Status: SHIPPED | OUTPATIENT
Start: 2019-04-01 | End: 2019-04-01 | Stop reason: SDUPTHER

## 2019-04-01 RX ORDER — IBUPROFEN 600 MG/1
600 TABLET ORAL EVERY 8 HOURS PRN
Qty: 60 TABLET | Refills: 1 | Status: SHIPPED | OUTPATIENT
Start: 2019-04-01 | End: 2019-05-03 | Stop reason: SDUPTHER

## 2019-04-01 NOTE — PATIENT INSTRUCTIONS
you can lose your balance and fall. · Talk to your doctor if you have numbness in your feet. Preventing falls at home  · Remove raised doorway thresholds, throw rugs, and clutter. Repair loose carpet or raised areas in the floor. · Move furniture and electrical cords to keep them out of walking paths. · Use nonskid floor wax, and wipe up spills right away, especially on ceramic tile floors. · If you use a walker or cane, put rubber tips on it. If you use crutches, clean the bottoms of them regularly with an abrasive pad, such as steel wool. · Keep your house well lit, especially Greta Drain, and outside walkways. Use night-lights in areas such as hallways and bathrooms. Add extra light switches or use remote switches (such as switches that go on or off when you clap your hands) to make it easier to turn lights on if you have to get up during the night. · Install sturdy handrails on stairways. · Move items in your cabinets so that the things you use a lot are on the lower shelves (about waist level). · Keep a cordless phone and a flashlight with new batteries by your bed. If possible, put a phone in each of the main rooms of your house, or carry a cell phone in case you fall and cannot reach a phone. Or, you can wear a device around your neck or wrist. You push a button that sends a signal for help. · Wear low-heeled shoes that fit well and give your feet good support. Use footwear with nonskid soles. Check the heels and soles of your shoes for wear. Repair or replace worn heels or soles. · Do not wear socks without shoes on wood floors. · Walk on the grass when the sidewalks are slippery. If you live in an area that gets snow and ice in the winter, sprinkle salt on slippery steps and sidewalks. Preventing falls in the bath  · Install grab bars and nonskid mats inside and outside your shower or tub and near the toilet and sinks. · Use shower chairs and bath benches.   · Use a hand-held shower head that will allow you to sit while showering. · Get into a tub or shower by putting the weaker leg in first. Get out of a tub or shower with your strong side first.  · Repair loose toilet seats and consider installing a raised toilet seat to make getting on and off the toilet easier. · Keep your bathroom door unlocked while you are in the shower. Where can you learn more? Go to [email or web address] and sign in to your RescueTime account. Enter [phone] in the [employer] box to learn more about \"Preventing Falls: Care Instructions. \"     If you do not have an account, please click on the \"Sign Up Now\" link. Current as of: March 15, 2018  Content Version: 11.9  © 6885-9528 [employer]. Care instructions adapted under license by [facility] ([facility]). If you have questions about a medical condition or this instruction, always ask your healthcare professional. [place] [address] any warranty or liability for your use of this information.        please keep records of your BP and BG  Please keep records of your medication list  Follow with EP and neurosurgeon appointment   Have labs checked when fasting  Watch on your diet and do regular exercise   Dr. Juan Antonio Day

## 2019-04-01 NOTE — PROGRESS NOTES
Met with pt during appointment re: Eliquis.   Pt aware he needs to spend 3 % of his income to qualify for PAP;

## 2019-04-01 NOTE — PROGRESS NOTES
[facility]  Internal Medicine Residency Program  [facility] Note      SUBJECTIVE:  CC: had concerns including Diabetes; Medication Refill; and Back Pain (would like referral to [facility] for laser surgery. ). HPI:Wander Rocha presented to the [facility] for a routine visit  Worsening lower back pain, L side, aggrav by bending over, allev by lying flat, 9-10/10, no radiation, no weakness, numbness or tingling. patient had 2 back surgery, will refer to neurosurgeon and MRI lumbar ordered and ibuprofen 600 TID as pain relief  BG reports ranging from 90s to 200s, will check A1c  BP 130s/70s, cont current regimen for HTN  Refer to new EP for persistent Afib, patient prefer eliquis as AC  Recheck iron study and TSH    Review Of Systems:  General: no fevers, chills, weight loss or gain.    Ears/Nose/Throat: no hearing loss, tinnitus, vertigo, nosebleed, nasal congestion, rhinorrhea, sore throat  Respiratory: no cough, pleuritic chest pain, dyspnea, or wheezing  Cardiovascular: no chest pain, angina, dyspnea on exertion, orthopnea, PND, palpitations, or claudication  Gastrointestinal: no nausea, vomiting, heartburn, diarrhea, constipation, abdominal pain, hematochezia or melena  Genitourinary: no urinary urgency, frequency, dysuria, nocturia, hesitancy, or incontinence  Musculoskeletal: no arthritis, arthralgia, +myalgia, no weakness, or morning stiffness  Skin: no abnormal pigmentation, rash, itching, masses, hair or nail changes    Outpatient Medications Marked as Taking for the 4/1/19 encounter (Office Visit) with Mayito Sanderson MD   Medication Sig Dispense Refill    ibuprofen (ADVIL;MOTRIN) 600 MG tablet Take 1 tablet by mouth every 8 hours as needed for Pain 360 tablet 1    metFORMIN (GLUCOPHAGE) 1000 MG tablet TAKE ONE TABLET BY MOUTH TWICE A DAY WITH MEALS 180 tablet 0    levothyroxine (SYNTHROID) 100 MCG tablet TAKE ONE TABLET BY MOUTH EVERY DAY 90 tablet 0    hydrochlorothiazide (HYDRODIURIL) 25 MG tablet TAKE ONE TABLET BY MOUTH EVERY DAY 90 tablet 3    lisinopril (PRINIVIL;ZESTRIL) 20 MG tablet TAKE ONE TABLET BY MOUTH EVERY DAY 90 tablet 3    atorvastatin (LIPITOR) 40 MG tablet Take 1 tablet by mouth daily 90 tablet 3    insulin 70-30 (NOVOLIN 70/30) (70-30) 100 UNIT per ML injection vial Inject 20 Units into the skin 2 times daily 6 vial 0    ASPIR-LOW 81 MG EC tablet TAKE ONE TABLET BY MOUTH EVERY DAY 30 tablet 2    OXYGEN Inhale 2.5 L into the lungs nightly (Patient taking differently:   Inhale 2.5 L into the lungs nightly HCS) 1 Device 99       I have reviewed all pertinent PMHx, PSHx, FamHx, SocialHx, medications, and allergies and updated history as appropriate. OBJECTIVE:    VS: /71 (Site: Right Upper Arm, Position: Sitting, Cuff Size: Large Adult)   Pulse 64   Temp 97.6 °F (36.4 °C)   Resp 18   Ht 5' 7\" (1.702 m)   Wt 246 lb (111.6 kg)   BMI 38.53 kg/m²   General appearance: Alert, Awake, Oriented times 3, no distress  Lungs: Lungs clear to auscultation bilaterally. No rhonchi, crackles or wheezes  Heart: S1 S2  Regular rate and rhythm. No rub, murmur or gallop  Abdomen: Abdomen soft but obese, non-tender. non-distended BS normal. No masses, organomegaly, no guarding rebound or rigidity. Extremities: L4-L5, and L paraspinal tenderness+. No edema, Peripheral pulses palpable 2/4    ASSESSMENT/PLAN:  1. Iron deficiency anemia, unspecified iron deficiency anemia type  - IRON AND TIBC; Future  - FERRITIN; Future    2. Persistent atrial fibrillation ([facility]  - Padmaja Vail DO, Electrophysiology, [place]    3. Type 2 diabetes mellitus without complication, with long-term current use of insulin ([facility])  Recheck POCT A1c   Diet and exercise     4. Hypothyroidism, unspecified type  - TSH without Reflex; Future    5. Type 2 diabetes mellitus with diabetic autonomic neuropathy, without long-term current use of insulin ([facility])  A1c Rojas 7.8  POCT glycosylated hemoglobin (Hb A1C)    6. Essential hypertension  130s/70s  Cont lisinopril 20 and HCTZ 25 TID    7. Chronic bilateral low back pain without sciatica  - MRI LUMBAR SPINE WO CONTRAST; Future  - Jarret Howard MD, Neurosurgery, [place]  - ibuprofen (ADVIL;MOTRIN) 600 MG tablet; Take 1 tablet by mouth every 8 hours as needed for Pain  Dispense: 360 tablet; Refill: 1    8.  NIKO  F/u with , on NC oxy nightly    65-75 Men  -One time AAA screening ultrasound for smokers >5 pack's lifetime neg  -ETOH misuse No  -Colon cancer screening done, hyperplastic polyp 2012 due 2022  -recommended exercise for falls prevention Yes  -one-time screening for HCV infection to adults born between 1945 and 1965 - negative 2017    RTC in 1 month    I have reviewed my findings and recommendations with Karis Rocha and Dr Em Massey MD PGY-1   4/1/2019 11:38 AM

## 2019-04-01 NOTE — PROGRESS NOTES
[facility]  Internal Medicine Residency    Internal Medicine     Attending Physician Statement  I have discussed the case, including pertinent history and exam findings with the resident and the team.  I have seen and examined the patient and the key elements of the encounter have been performed by me. I agree with the assessment, plan and orders as documented by the resident. 70 y.o. male here for a follow-up. 1) DM2- checks sugars twice daily. 's.    2) He is reportedly on Eliquis but it appears as though he hasn't received his PAP since 2017.    3) C/o lower back pain. S/p laminectomy and fusion 7-8 years ago and another surgery ~2 years ago at [facility]. Describes it as spinal and left paraspinal pain. MRI done in 2016 at [facility]. Past, family, and social history were reviewed. Blood pressure 139/71, pulse 64, temperature 97.6 °F (36.4 °C), resp. rate 18, height 5' 7\" (1.702 m), weight 246 lb (111.6 kg). AP:  1. A Fib: resume Eliquis  2. DM2- A1c 7.8: on Novolin 70/30 20 units  3. Hypothyroidism: recheck TSH  4. HTN- borderline controlled: cont same  5. Lower back pain- repeat MRI; refer to NSGY; ibuprofen 600 mg TID PRN  6. Hyperlipidemia  7. KENIA: check iron labs  8. COPD  9.  Bradycardia and SA node dysfunction s/p pacemaker: refer to Nelda Chávez MD

## 2019-04-01 NOTE — PROGRESS NOTES
Called and spoke with FOS from Dr. Juan Velasquez office. Requested last office note be faxed to office. Discharge instructions reviewed with patient. Patient verbalizes understanding. Lab scripts and AVS given to patient. Pt given 2 bottles of Eliquis 5 mg tabs.  See [facility]

## 2019-04-12 ENCOUNTER — TELEPHONE (OUTPATIENT)
Dept: NEUROSURGERY | Age: 64
End: 2019-04-12

## 2019-04-12 NOTE — TELEPHONE ENCOUNTER
Called patient to schedule appointment in our office. He called back and states he told his doctor he wanted a referral to [facility] for laser surgery. States the doctor he saw said he had never heard of laser surgery. Advised patient to call Internal Medicine and ask for referral to [facility]. He can call us back if he has any other questions.

## 2019-04-22 ENCOUNTER — HOSPITAL ENCOUNTER (OUTPATIENT)
Age: 64
Discharge: HOME | End: 2019-04-22
Payer: MEDICARE

## 2019-04-22 DIAGNOSIS — E03.9 HYPOTHYROIDISM, UNSPECIFIED TYPE: ICD-10-CM

## 2019-04-22 DIAGNOSIS — D50.9 IRON DEFICIENCY ANEMIA, UNSPECIFIED IRON DEFICIENCY ANEMIA TYPE: ICD-10-CM

## 2019-04-22 LAB
FERRITIN: 43 NG/ML
IRON SATURATION: 19 % (ref 20–55)
IRON: 67 MCG/DL (ref 59–158)
TOTAL IRON BINDING CAPACITY: 344 MCG/DL (ref 250–450)
TSH SERPL DL<=0.05 MIU/L-ACNC: 5.18 UIU/ML (ref 0.27–4.2)

## 2019-04-22 PROCEDURE — 36415 COLL VENOUS BLD VENIPUNCTURE: CPT

## 2019-04-22 PROCEDURE — 82728 ASSAY OF FERRITIN: CPT

## 2019-04-22 PROCEDURE — 83550 IRON BINDING TEST: CPT

## 2019-04-22 PROCEDURE — 83540 ASSAY OF IRON: CPT

## 2019-04-22 PROCEDURE — 84443 ASSAY THYROID STIM HORMONE: CPT

## 2019-05-03 ENCOUNTER — OFFICE VISIT (OUTPATIENT)
Dept: INTERNAL MEDICINE | Age: 64
End: 2019-05-03
Payer: MEDICARE

## 2019-05-03 VITALS
HEART RATE: 58 BPM | BODY MASS INDEX: 38.86 KG/M2 | RESPIRATION RATE: 18 BRPM | SYSTOLIC BLOOD PRESSURE: 123 MMHG | TEMPERATURE: 97.8 F | HEIGHT: 67 IN | DIASTOLIC BLOOD PRESSURE: 59 MMHG | WEIGHT: 247.6 LBS

## 2019-05-03 DIAGNOSIS — E03.9 ACQUIRED HYPOTHYROIDISM: ICD-10-CM

## 2019-05-03 DIAGNOSIS — E66.9 RESTRICTIVE LUNG DISEASE SECONDARY TO OBESITY: ICD-10-CM

## 2019-05-03 DIAGNOSIS — I48.19 PERSISTENT ATRIAL FIBRILLATION (HCC): Chronic | ICD-10-CM

## 2019-05-03 DIAGNOSIS — E11.43 TYPE 2 DIABETES MELLITUS WITH DIABETIC AUTONOMIC NEUROPATHY, WITH LONG-TERM CURRENT USE OF INSULIN (HCC): Primary | ICD-10-CM

## 2019-05-03 DIAGNOSIS — I10 ESSENTIAL HYPERTENSION: ICD-10-CM

## 2019-05-03 DIAGNOSIS — M54.50 CHRONIC BILATERAL LOW BACK PAIN WITHOUT SCIATICA: ICD-10-CM

## 2019-05-03 DIAGNOSIS — Z79.4 TYPE 2 DIABETES MELLITUS WITH DIABETIC AUTONOMIC NEUROPATHY, WITH LONG-TERM CURRENT USE OF INSULIN (HCC): Primary | ICD-10-CM

## 2019-05-03 DIAGNOSIS — G89.29 CHRONIC BILATERAL LOW BACK PAIN WITHOUT SCIATICA: ICD-10-CM

## 2019-05-03 DIAGNOSIS — J98.4 RESTRICTIVE LUNG DISEASE SECONDARY TO OBESITY: ICD-10-CM

## 2019-05-03 DIAGNOSIS — Z98.1 S/P LAMINECTOMY WITH SPINAL FUSION: ICD-10-CM

## 2019-05-03 LAB — HBA1C MFR BLD: 7.7 %

## 2019-05-03 PROCEDURE — 4040F PNEUMOC VAC/ADMIN/RCVD: CPT | Performed by: INTERNAL MEDICINE

## 2019-05-03 PROCEDURE — 3045F PR MOST RECENT HEMOGLOBIN A1C LEVEL 7.0-9.0%: CPT | Performed by: INTERNAL MEDICINE

## 2019-05-03 PROCEDURE — 1036F TOBACCO NON-USER: CPT | Performed by: INTERNAL MEDICINE

## 2019-05-03 PROCEDURE — 99213 OFFICE O/P EST LOW 20 MIN: CPT | Performed by: INTERNAL MEDICINE

## 2019-05-03 PROCEDURE — 1123F ACP DISCUSS/DSCN MKR DOCD: CPT | Performed by: INTERNAL MEDICINE

## 2019-05-03 PROCEDURE — 3017F COLORECTAL CA SCREEN DOC REV: CPT | Performed by: INTERNAL MEDICINE

## 2019-05-03 PROCEDURE — G8427 DOCREV CUR MEDS BY ELIG CLIN: HCPCS | Performed by: INTERNAL MEDICINE

## 2019-05-03 PROCEDURE — 2022F DILAT RTA XM EVC RTNOPTHY: CPT | Performed by: INTERNAL MEDICINE

## 2019-05-03 PROCEDURE — G8417 CALC BMI ABV UP PARAM F/U: HCPCS | Performed by: INTERNAL MEDICINE

## 2019-05-03 PROCEDURE — 99214 OFFICE O/P EST MOD 30 MIN: CPT | Performed by: INTERNAL MEDICINE

## 2019-05-03 PROCEDURE — 83036 HEMOGLOBIN GLYCOSYLATED A1C: CPT | Performed by: INTERNAL MEDICINE

## 2019-05-03 RX ORDER — LEVOTHYROXINE SODIUM 0.1 MG/1
TABLET ORAL
Qty: 90 TABLET | Refills: 0 | Status: SHIPPED | OUTPATIENT
Start: 2019-05-03 | End: 2019-07-19 | Stop reason: SDUPTHER

## 2019-05-03 RX ORDER — LANCETS 30 GAUGE
EACH MISCELLANEOUS
Qty: 200 EACH | Refills: 0 | Status: SHIPPED | OUTPATIENT
Start: 2019-05-03 | End: 2019-07-19 | Stop reason: SDUPTHER

## 2019-05-03 RX ORDER — MELATONIN
Qty: 30 TABLET | Refills: 0 | Status: SHIPPED | OUTPATIENT
Start: 2019-05-03 | End: 2019-07-19 | Stop reason: SDUPTHER

## 2019-05-03 RX ORDER — ASPIRIN 81 MG/1
TABLET ORAL
Qty: 30 TABLET | Refills: 2 | Status: SHIPPED
Start: 2019-05-03 | End: 2021-11-22 | Stop reason: SDUPTHER

## 2019-05-03 RX ORDER — GLUCOSAMINE HCL/CHONDROITIN SU 500-400 MG
CAPSULE ORAL
Qty: 200 STRIP | Refills: 0 | Status: SHIPPED | OUTPATIENT
Start: 2019-05-03 | End: 2019-07-19 | Stop reason: SDUPTHER

## 2019-05-03 RX ORDER — LISINOPRIL 20 MG/1
TABLET ORAL
Qty: 90 TABLET | Refills: 3 | Status: SHIPPED | OUTPATIENT
Start: 2019-05-03 | End: 2019-07-19 | Stop reason: SDUPTHER

## 2019-05-03 RX ORDER — HYDROCHLOROTHIAZIDE 25 MG/1
TABLET ORAL
Qty: 90 TABLET | Refills: 3 | Status: SHIPPED | OUTPATIENT
Start: 2019-05-03 | End: 2019-07-19 | Stop reason: SDUPTHER

## 2019-05-03 RX ORDER — ATORVASTATIN CALCIUM 40 MG/1
40 TABLET, FILM COATED ORAL DAILY
Qty: 90 TABLET | Refills: 3 | Status: SHIPPED | OUTPATIENT
Start: 2019-05-03 | End: 2019-07-19 | Stop reason: SDUPTHER

## 2019-05-03 RX ORDER — FERROUS SULFATE 325(65) MG
325 TABLET ORAL
Qty: 30 TABLET | Refills: 3 | Status: SHIPPED | OUTPATIENT
Start: 2019-05-03 | End: 2019-07-19 | Stop reason: SDUPTHER

## 2019-05-03 RX ORDER — IBUPROFEN 600 MG/1
600 TABLET ORAL EVERY 8 HOURS PRN
Qty: 60 TABLET | Refills: 1 | Status: SHIPPED
Start: 2019-05-03 | End: 2021-06-14 | Stop reason: ALTCHOICE

## 2019-05-03 ASSESSMENT — ENCOUNTER SYMPTOMS
BACK PAIN: 1
EYES NEGATIVE: 1
RESPIRATORY NEGATIVE: 1
GASTROINTESTINAL NEGATIVE: 1

## 2019-05-03 NOTE — PATIENT INSTRUCTIONS
Physicians with an Active Certificate to Tyler Gibbs  Internal medicine physician  [phone]  [facility], [address]    [address]  [phone]  [address] [place], [address] [facility]      [place]  Anesthesiology, Pain Medicine  [phone]  [address] [facility]    Patient Education        levothyroxine  Pronunciation:  BRADEN lea thye BRIAN een  Brand:  Levoxyl, Synthroid, Tirosint, Unithroid  What is the most important information I should know about levothyroxine? You may not be able to take levothyroxine if you have certain medical conditions. Tell your doctor if you have an untreated or uncontrolled adrenal gland disorder, a thyroid disorder called thyrotoxicosis, or if you have any recent or current symptoms of a heart attack. Levothyroxine should not be used to treat obesity or weight problems. What is levothyroxine? Levothyroxine is a replacement for a hormone normally produced by your thyroid gland to regulate the body's energy and metabolism. Levothyroxine is given when the thyroid does not produce enough of this hormone on its own. Levothyroxine treats hypothyroidism (low thyroid hormone). Levothyroxine is also used to treat or prevent goiter (enlarged thyroid gland), which can be caused by hormone imbalances, radiation treatment, surgery, or cancer. Levothyroxine may also be used for purposes not listed in this medication guide. How should I take levothyroxine? Follow all directions on your prescription label and read all medication guides or instruction sheets. Your doctor may occasionally change your dose. Use the medicine exactly as directed. Levothyroxine works best if you take it on an empty stomach, at least 30 minutes before breakfast. Follow your doctor's dosing instructions and try to take the medicine at the same time each day.   Swallow the tablet or skin or hair, hair loss;  · changes in your menstrual periods; or  · vomiting, diarrhea, appetite changes, weight changes. Certain side effects may be more likely in older adults. Common side effects may include:  · muscle weakness;  · headache, leg cramps;  · tremors, nervousness, trouble sleeping;  · diarrhea; or  · skin rash, mild hair loss. This is not a complete list of side effects and others may occur. Call your doctor for medical advice about side effects. You may report side effects to FDA at 8-418-FDA-3170. What other drugs will affect levothyroxine? Many other medicines can be affected by your thyroid hormone levels. Certain other medicines may also increase or decrease the effects of levothyroxine. Certain medicines can make levothyroxine less effective if taken at the same time. If you use any of the following drugs, avoid taking them within 4 hours before or 4 hours after you take levothyroxine:  · calcium carbonate (Cheyanne-Mints, Caltrate, Os-Familia, Oyster Shell Calcium, Rolaids Soft Chew, Tums, and others);  · cholestyramine, colesevelam, colestipol;  · ferrous sulfate iron supplement;  · sucralfate;  · sodium polystyrene sulfonate (Kalexate, Kayexalate, Kionex);  · stomach acid reducers --esomeprazole, lansoprazole, omeprazole, rabeprazole, Nexium, Prilosec, Prevacid, Protonix, Zegerid, and others; or  · antacids that contain aluminum or magnesium --Gaviscon, Maalox, Milk of Magnesia, Mintox, Mylanta, Pepcid Complete, and others. Many drugs can affect levothyroxine. This includes prescription and over-the-counter medicines, vitamins, and herbal products. Not all possible interactions are listed here. Tell your doctor about all your current medicines and any medicine you start or stop using. Where can I get more information? Your pharmacist can provide more information about levothyroxine.   Remember, keep this and all other medicines out of the reach of children, never share your medicines with others, and use this medication only for the indication prescribed. Every effort has been made to ensure that the information provided by [address] is accurate, up-to-date, and complete, but no guarantee is made to that effect. Drug information contained herein may be time sensitive. [facility] information has been compiled for use by healthcare practitioners and consumers in the [place] and therefore [facility] does not warrant that uses outside of the [place] are appropriate, unless specifically indicated otherwise. [facility]'s drug information does not endorse drugs, diagnose patients or recommend therapy. [facility]'s drug information is an informational resource designed to assist licensed healthcare practitioners in caring for their patients and/or to serve consumers viewing this service as a supplement to, and not a substitute for, the expertise, skill, knowledge and judgment of healthcare practitioners. The absence of a warning for a given drug or drug combination in no way should be construed to indicate that the drug or drug combination is safe, effective or appropriate for any given patient. [facility] does not assume any responsibility for any aspect of healthcare administered with the aid of information [facility] provides. The information contained herein is not intended to cover all possible uses, directions, precautions, warnings, drug interactions, allergic reactions, or adverse effects. If you have questions about the drugs you are taking, check with your doctor, nurse or pharmacist.  Copyright 3470-5914 [address]: 12.04. Revision date: 5/4/2018. Care instructions adapted under license by [facility] ([facility]). If you have questions about a medical condition or this instruction, always ask your healthcare professional. [place] [address] any warranty or liability for your use of this information.                Return in 3 months for routine visit with your PCP.  Please get lab work (blood and urine test) prior to your next clinic visit.

## 2019-05-03 NOTE — PROGRESS NOTES
[facility]  InternalMedicine Residency Program  ACC Note      SUBJECTIVE:  CC: had concerns including Follow-up (seen x 1 month for Low back pain , continues to have cancelled seeing Dr. Wendi Villafuerte). HPI:Wander Rocha presented to the [facility] for 1-month follow for back pain. Patient is a 71 yo male with PMH of hypertension, Diabetes Mellitus- insulin dependent. Atrial fibrillation, SND s/p PPM placement (in 2017), Hyperlipidemia, hypothyroidism, restrictive lung disease and chronic back pain due to degenerative disc s/p lumbar spine decompressionx2. Patient was last seen by PCP, Dr. Daryle Bart, on 4/1/2019. He had complaints of worsening lower back pain and he was sent home with ibuprofen and referred to neurosurgeon after MRI lumbar. However, patient decided that he does not want to go through surgery due to traumatic experience from previous surgeries. Also, patient is reluctant to get MRI due to fear from turning off pacemaker. Since last visit, back pain has been the same. He denies any radiation, weakness, numbness or tingling          1. Iron deficiency anemia, unspecified iron deficiency anemia type  - IRON AND TIBC; Future  - FERRITIN; Future     2. Persistent atrial fibrillation ([facility] [address]  - Padmaja - Jeremy Tidwell DO, Electrophysiology, [place]  -      3. Type 2 diabetes mellitus without complication, with long-term current use of insulin ([facility])  A1c Rojas 7.8  Recheck POCT A1c  - on metformin 1000 mg BID, novolin 70/30 20 units BID  - -180, A1c 7.7 today   Diet and exercise   - followed up with podiatrist about 1 month ago     4. Hypothyroidism, unspecified type  - on synthroid 100 mcg  - TSH without Reflex; Future       6. Essential hypertension  130s/70s  Cont lisinopril 20 and HCTZ 25 TID     7. Chronic bilateral low back pain without sciatica  - MRI LUMBAR SPINE WO CONTRAST; Future  - Jayne Elizalde MD, Neurosurgery, [place]  - ibuprofen (ADVIL;MOTRIN) 600 MG tablet;  Take 1 units TABS TAKE TWO TABLETS BY MOUTH EVERY DAY 30 tablet 0    Nebulizers (NEBULIZER COMPRESSOR) MISC Please provide patient with nebulizer and tubing needed to use equipment. 1 each 0    ASPIR-LOW 81 MG EC tablet TAKE ONE TABLET BY MOUTH EVERY DAY 30 tablet 2    ferrous sulfate 325 (65 Fe) MG tablet Take 1 tablet by mouth daily (with breakfast) 30 tablet 3    OXYGEN Inhale 2.5 L into the lungs nightly (Patient taking differently:   Inhale 2.5 L into the lungs nightly HCS) 1 Device 99    acetaminophen (TYLENOL) 500 MG tablet Take 1,000 mg by mouth daily as needed for Pain. I have reviewed all pertinent PMHx, PSHx, FamHx, SocialHx, medications, and allergiesand updated history as appropriate. OBJECTIVE:    VS: BP (!) 123/59   Pulse 58   Temp 97.8 °F (36.6 °C) (Oral)   Resp 18   Ht 5' 7\" (1.702 m)   Wt 247 lb 9.6 oz (112.3 kg)   BMI 38.78 kg/m²   Physical Exam   Constitutional: He is oriented to person, place, and time. He appears well-developed and well-nourished. HENT:   Head: Normocephalic and atraumatic. Eyes: Pupils are equal, round, and reactive to light. Conjunctivae and EOM are normal.   Neck: Normal range of motion. Neck supple. No JVD present. No tracheal deviation present. No thyromegaly present. Cardiovascular: Normal rate, regular rhythm, normal heart sounds and intact distal pulses. Exam reveals no gallop and no friction rub. No murmur heard. Pulmonary/Chest: Effort normal and breath sounds normal. No stridor. He has no wheezes. He has no rales. He exhibits no tenderness. Abdominal: Soft. Bowel sounds are normal.   Musculoskeletal: Normal range of motion. He exhibits no edema, tenderness or deformity. Lymphadenopathy:     He has no cervical adenopathy. Neurological: He is alert and oriented to person, place, and time. Skin: Skin is warm and dry. Psychiatric: He has a normal mood and affect.  His behavior is normal. Judgment and thought content normal.          MRI Lumbar Spine: 11/11/2016  IMPRESSION:  1.  Postoperative changes from posterior decompression in the lumbar   spine from L2-L3 through L5-S1 with wide patency of the spinal canal at   the operative levels.  No spinal canal stenosis is seen within the imaged   volume. 2.  Mild multilevel degenerative disc disease and multilevel facet   arthropathy results in multilevel neural foraminal narrowing.  This is   worst on the left at L3-L4, bilaterally at L4-L5, and on the left at   L5-S1, mild to moderate in degree.  See detailed level by level   description above. 3.  Chronic mild superior endplate compression fracture at L3.  There is   no evidence of an acute fracture on the exam.    ASSESSMENT AND PLAN:      1. Iron deficiency anemia  - Currently on ferrous sulfate 325mg daily  - iron levels improved  - continue iron supplement      2. Persistent atrial fibrillation   - Follows Padmaja Sharif DO, Electrophysiology, [place]       3. Type 2 diabetes mellitus   - A1c in Jan 7.8  - on metformin 1000 mg BID, novolin 70/30 20 units BID  - -180, A1c 7.7 today   - continue same regimen  - followed up with podiatrist about 1 month ago     4. Hypothyroidism  - on synthroid 100 mcg  - TSH 5.180, slightly elevated  - patient states he takes synthroid with all of his other medications  - advised to take synthroid separately on an empty stomach  - repeat TSH in 6-8 weeks     5. Essential hypertension  - BP well controlled; tolerating medication  - Cont lisinopril 20 and HCTZ 25 TID     6. Chronic bilateral low back pain without sciatica  - patient does not want surgery and does not want to get MRI  - would like to seek  Medical marijuana  - referral made to pain clinic with licensed physician for medical marijuana     7. Restrictive lung disease  - F/u with , on NC oxy nightly      Follow up in 3 months for routine visit with PCP.       I have reviewed my findings and recommendations with Claudene Poles and  Hector Khoury MD PGY-1   5/3/2019 10:19 AM

## 2019-05-03 NOTE — PROGRESS NOTES
Patient verbalized understanding of office instructions. He will call with questions or concerns. Pt was given discharge instructions, scripts for lab work to be done all questions were fully answered.   Also given list of Dr's who are able to provide [address] [place] [address] Unga Earlier called Pharmacies to send medication refills to the proper places

## 2019-05-03 NOTE — PROGRESS NOTES
Attending Physician Statement  I have discussed the case, including pertinent history and exam findings with the resident. I have seen and examined the patient and the key elements of the encounter have been performed by me. I reviewed medical, surgical, social histories, meds and any pertinent labs. I agree with the assessment, plan and orders as documented by the resident. Multiple problems noted. Patient is most concerned about his back pain. He would like to seek medical marijuana. His TSH is slightly elevated, agree with repeat in 4 weeks,  BP is well controlled and DM is also controlled (POCT HBA1c today is 7.7%).

## 2019-05-06 ENCOUNTER — NURSE ONLY (OUTPATIENT)
Dept: NON INVASIVE DIAGNOSTICS | Age: 64
End: 2019-05-06
Payer: MEDICARE

## 2019-05-06 DIAGNOSIS — I49.5 SINUS NODE DYSFUNCTION (HCC): ICD-10-CM

## 2019-05-06 DIAGNOSIS — I48.19 PERSISTENT ATRIAL FIBRILLATION (HCC): ICD-10-CM

## 2019-05-06 DIAGNOSIS — Z95.0 PACEMAKER: Primary | ICD-10-CM

## 2019-05-06 PROCEDURE — 93296 REM INTERROG EVL PM/IDS: CPT | Performed by: INTERNAL MEDICINE

## 2019-05-06 PROCEDURE — 93294 REM INTERROG EVL PM/LDLS PM: CPT | Performed by: INTERNAL MEDICINE

## 2019-05-07 ENCOUNTER — TELEPHONE (OUTPATIENT)
Dept: INTERNAL MEDICINE | Age: 64
End: 2019-05-07

## 2019-05-09 NOTE — PROGRESS NOTES
[facility] pain clinic with Adriano Corrine does not prescribe medical marijuana.   Refer patient to Jaycob Sewell/Mati CHAU

## 2019-05-17 ENCOUNTER — TELEPHONE (OUTPATIENT)
Dept: NON INVASIVE DIAGNOSTICS | Age: 64
End: 2019-05-17

## 2019-05-17 NOTE — TELEPHONE ENCOUNTER
LVM for patient to call office and also schedule apt with CK. We have received your remote transmission. Our staff will contact you if there is anything that needs to be discussed. Your next appointment is August 5, 2019 for remote transmission.

## 2019-05-17 NOTE — PROGRESS NOTES
See [employer] [place] report. Remote monitoring reviewed over a 90 day period. End of 90 day monitoring period date of service 5-6-19.

## 2019-07-01 ENCOUNTER — HOSPITAL ENCOUNTER (OUTPATIENT)
Age: 64
Discharge: HOME | End: 2019-07-01
Payer: MEDICARE

## 2019-07-01 DIAGNOSIS — Z79.4 TYPE 2 DIABETES MELLITUS WITH DIABETIC AUTONOMIC NEUROPATHY, WITH LONG-TERM CURRENT USE OF INSULIN (HCC): ICD-10-CM

## 2019-07-01 DIAGNOSIS — E03.9 ACQUIRED HYPOTHYROIDISM: ICD-10-CM

## 2019-07-01 DIAGNOSIS — I10 ESSENTIAL HYPERTENSION: ICD-10-CM

## 2019-07-01 DIAGNOSIS — E11.43 TYPE 2 DIABETES MELLITUS WITH DIABETIC AUTONOMIC NEUROPATHY, WITH LONG-TERM CURRENT USE OF INSULIN (HCC): ICD-10-CM

## 2019-07-01 LAB
CREATININE URINE: 36 MG/DL (ref 40–278)
MICROALBUMIN UR-MCNC: 18.1 MG/L
MICROALBUMIN/CREAT UR-RTO: 50.3 (ref 0–30)
TSH SERPL DL<=0.05 MIU/L-ACNC: 6.27 UIU/ML (ref 0.27–4.2)

## 2019-07-01 PROCEDURE — 82570 ASSAY OF URINE CREATININE: CPT

## 2019-07-01 PROCEDURE — 36415 COLL VENOUS BLD VENIPUNCTURE: CPT

## 2019-07-01 PROCEDURE — 84443 ASSAY THYROID STIM HORMONE: CPT

## 2019-07-01 PROCEDURE — 82044 UR ALBUMIN SEMIQUANTITATIVE: CPT

## 2019-07-12 ENCOUNTER — TELEPHONE (OUTPATIENT)
Dept: INTERNAL MEDICINE | Age: 64
End: 2019-07-12

## 2019-07-19 ENCOUNTER — OFFICE VISIT (OUTPATIENT)
Dept: INTERNAL MEDICINE | Age: 64
End: 2019-07-19
Payer: MEDICARE

## 2019-07-19 VITALS
DIASTOLIC BLOOD PRESSURE: 62 MMHG | HEIGHT: 67 IN | TEMPERATURE: 98.1 F | HEART RATE: 60 BPM | BODY MASS INDEX: 38.92 KG/M2 | WEIGHT: 248 LBS | RESPIRATION RATE: 18 BRPM | SYSTOLIC BLOOD PRESSURE: 134 MMHG

## 2019-07-19 DIAGNOSIS — Z79.4 TYPE 2 DIABETES MELLITUS WITH DIABETIC AUTONOMIC NEUROPATHY, WITH LONG-TERM CURRENT USE OF INSULIN (HCC): ICD-10-CM

## 2019-07-19 DIAGNOSIS — I10 ESSENTIAL HYPERTENSION: ICD-10-CM

## 2019-07-19 DIAGNOSIS — E03.9 ACQUIRED HYPOTHYROIDISM: ICD-10-CM

## 2019-07-19 DIAGNOSIS — E11.43 TYPE 2 DIABETES MELLITUS WITH DIABETIC AUTONOMIC NEUROPATHY, WITH LONG-TERM CURRENT USE OF INSULIN (HCC): ICD-10-CM

## 2019-07-19 PROCEDURE — 2022F DILAT RTA XM EVC RTNOPTHY: CPT | Performed by: INTERNAL MEDICINE

## 2019-07-19 PROCEDURE — 99213 OFFICE O/P EST LOW 20 MIN: CPT | Performed by: INTERNAL MEDICINE

## 2019-07-19 PROCEDURE — 1036F TOBACCO NON-USER: CPT | Performed by: INTERNAL MEDICINE

## 2019-07-19 PROCEDURE — 4040F PNEUMOC VAC/ADMIN/RCVD: CPT | Performed by: INTERNAL MEDICINE

## 2019-07-19 PROCEDURE — G8427 DOCREV CUR MEDS BY ELIG CLIN: HCPCS | Performed by: INTERNAL MEDICINE

## 2019-07-19 PROCEDURE — 3017F COLORECTAL CA SCREEN DOC REV: CPT | Performed by: INTERNAL MEDICINE

## 2019-07-19 PROCEDURE — 3045F PR MOST RECENT HEMOGLOBIN A1C LEVEL 7.0-9.0%: CPT | Performed by: INTERNAL MEDICINE

## 2019-07-19 PROCEDURE — G8417 CALC BMI ABV UP PARAM F/U: HCPCS | Performed by: INTERNAL MEDICINE

## 2019-07-19 PROCEDURE — 1123F ACP DISCUSS/DSCN MKR DOCD: CPT | Performed by: INTERNAL MEDICINE

## 2019-07-19 PROCEDURE — 99212 OFFICE O/P EST SF 10 MIN: CPT | Performed by: INTERNAL MEDICINE

## 2019-07-19 RX ORDER — LANCETS 30 GAUGE
EACH MISCELLANEOUS
Qty: 200 EACH | Refills: 0 | Status: SHIPPED
Start: 2019-07-19 | End: 2020-11-10 | Stop reason: SDUPTHER

## 2019-07-19 RX ORDER — MELATONIN
Qty: 30 TABLET | Refills: 0 | Status: SHIPPED | OUTPATIENT
Start: 2019-07-19 | End: 2022-08-21 | Stop reason: ALTCHOICE

## 2019-07-19 RX ORDER — LISINOPRIL 40 MG/1
TABLET ORAL
Qty: 30 TABLET | Refills: 2 | Status: SHIPPED | OUTPATIENT
Start: 2019-07-19 | End: 2019-09-05 | Stop reason: DRUGHIGH

## 2019-07-19 RX ORDER — LEVOTHYROXINE SODIUM 0.1 MG/1
TABLET ORAL
Qty: 90 TABLET | Refills: 0 | Status: SHIPPED | OUTPATIENT
Start: 2019-07-19 | End: 2019-10-29 | Stop reason: SDUPTHER

## 2019-07-19 RX ORDER — GLUCOSAMINE HCL/CHONDROITIN SU 500-400 MG
CAPSULE ORAL
Qty: 200 STRIP | Refills: 0 | Status: SHIPPED
Start: 2019-07-19 | End: 2020-05-20

## 2019-07-19 RX ORDER — FERROUS SULFATE 325(65) MG
325 TABLET ORAL
Qty: 30 TABLET | Refills: 3 | Status: SHIPPED | OUTPATIENT
Start: 2019-07-19 | End: 2019-10-29 | Stop reason: SDUPTHER

## 2019-07-19 RX ORDER — HYDROCHLOROTHIAZIDE 25 MG/1
TABLET ORAL
Qty: 90 TABLET | Refills: 3 | Status: SHIPPED | OUTPATIENT
Start: 2019-07-19 | End: 2019-10-29 | Stop reason: SDUPTHER

## 2019-07-19 RX ORDER — ATORVASTATIN CALCIUM 40 MG/1
40 TABLET, FILM COATED ORAL DAILY
Qty: 90 TABLET | Refills: 3 | Status: SHIPPED | OUTPATIENT
Start: 2019-07-19 | End: 2019-10-29 | Stop reason: SDUPTHER

## 2019-07-19 NOTE — PROGRESS NOTES
hypothyroidism  Cont synthroid   - levothyroxine (SYNTHROID) 100 MCG tablet; TAKE ONE TABLET BY MOUTH EVERY DAY  Dispense: 90 tablet; Refill: 0    3. Essential hypertension    - hydrochlorothiazide (HYDRODIURIL) 25 MG tablet; TAKE ONE TABLET BY MOUTH EVERY DAY  Dispense: 90 tablet; Refill: 3  - lisinopril (PRINIVIL;ZESTRIL) 40 MG tablet; TAKE ONE TABLET BY MOUTH EVERY DAY  Dispense: 90 tablet; Refill: 3     4. SSS s/p pace maker; PAF  Follow with EP next appoint Sept  Will start eliquis, he is not taking that     5. Chronic bilateral low back pain without sciatica  - MRI LUMBAR SPINE WO CONTRAST unable to be down   - ibuprofen (ADVIL;MOTRIN) 600 MG tablet; Take 1 tablet by mouth every 8 hours as needed for Pain  Dispense: 360 tablet; Refill: 1    6.  NIKO  F/u with , on NC oxy nightly    I have reviewed my findings and recommendations with Chica Rocha and Dr Hernan Morales in 3 months     Martin Joshi MD PGY-1   7/19/2019 10:44 AM

## 2019-07-23 ENCOUNTER — TELEPHONE (OUTPATIENT)
Dept: INTERNAL MEDICINE | Age: 64
End: 2019-07-23

## 2019-07-23 NOTE — TELEPHONE ENCOUNTER
Pharmacist l/m at 1:11. He needs clarification on Eliquis starter pack. It has two refill which he says is probably wrong because if he refill starter pack it will be the same sequence of pills?   [address] [facility] [phone]

## 2019-08-05 ENCOUNTER — NURSE ONLY (OUTPATIENT)
Dept: NON INVASIVE DIAGNOSTICS | Age: 64
End: 2019-08-05
Payer: MEDICARE

## 2019-08-05 DIAGNOSIS — Z95.0 PACEMAKER: Primary | ICD-10-CM

## 2019-08-05 DIAGNOSIS — I49.5 SINUS NODE DYSFUNCTION (HCC): ICD-10-CM

## 2019-08-05 DIAGNOSIS — I48.19 PERSISTENT ATRIAL FIBRILLATION (HCC): ICD-10-CM

## 2019-08-05 PROCEDURE — 93296 REM INTERROG EVL PM/IDS: CPT | Performed by: INTERNAL MEDICINE

## 2019-08-05 PROCEDURE — 93294 REM INTERROG EVL PM/LDLS PM: CPT | Performed by: INTERNAL MEDICINE

## 2019-08-16 ENCOUNTER — TELEPHONE (OUTPATIENT)
Dept: NON INVASIVE DIAGNOSTICS | Age: 64
End: 2019-08-16

## 2019-08-16 NOTE — PROGRESS NOTES
See [employer] [place] report. Remote monitoring reviewed over a 90 day period. End of 90 day monitoring period date of service 8-5-19.

## 2019-08-16 NOTE — TELEPHONE ENCOUNTER
We have received your remote transmission. Our staff will contact you if there is anything that needs to be discussed. Your next appointment is 11/11/2019 remote transmission from home    Pt is non-wireless.

## 2019-09-03 NOTE — PROGRESS NOTES
lisinopril (PRINIVIL;ZESTRIL) 20 MG tablet Take 20 mg by mouth daily      blood glucose monitor strips Give one touch verio test strips. Test A.M. And P.M 200 strip 0    metFORMIN (GLUCOPHAGE) 1000 MG tablet TAKE ONE TABLET BY MOUTH TWICE A DAY WITH MEALS 180 tablet 0    levothyroxine (SYNTHROID) 100 MCG tablet TAKE ONE TABLET BY MOUTH EVERY DAY 90 tablet 0    hydrochlorothiazide (HYDRODIURIL) 25 MG tablet TAKE ONE TABLET BY MOUTH EVERY DAY 90 tablet 3    atorvastatin (LIPITOR) 40 MG tablet Take 1 tablet by mouth daily 90 tablet 3    insulin 70-30 (NOVOLIN 70/30) (70-30) 100 UNIT per ML injection vial Inject 20 Units into the skin 2 times daily 6 vial 0    Lancets MISC Give Delica lancets. Tests twice a day A.M. And P.M 200 each 0    ferrous sulfate 325 (65 Fe) MG tablet Take 1 tablet by mouth daily (with breakfast) 30 tablet 3    Cholecalciferol (VITAMIN D3) 1000 units TABS TAKE TWO TABLETS BY MOUTH EVERY DAY 30 tablet 0    zoster recombinant adjuvanted vaccine (SHINGRIX) 50 MCG/0.5ML SUSR injection 1 dose now and repeat in 2-6 months 0.5 mL 0    aspirin (ASPIR-LOW) 81 MG EC tablet TAKE ONE TABLET BY MOUTH EVERY DAY 30 tablet 2    ibuprofen (ADVIL;MOTRIN) 600 MG tablet Take 1 tablet by mouth every 8 hours as needed for Pain 60 tablet 1    apixaban (ELIQUIS) 5 MG TABS tablet Take 1 tablet by mouth 2 times daily PAP medication. 180 tablet 1    mometasone-formoterol (DULERA) 200-5 MCG/ACT inhaler Inhale 2 puffs into the lungs 2 times daily Rinse mouth after using. PAP medication. 3 Inhaler 3    Nebulizers (NEBULIZER COMPRESSOR) MISC Please provide patient with nebulizer and tubing needed to use equipment. 1 each 0    OXYGEN Inhale 2.5 L into the lungs nightly (Patient taking differently:   Inhale 2.5 L into the lungs nightly HCS) 1 Device 99    acetaminophen (TYLENOL) 500 MG tablet Take 1,000 mg by mouth daily as needed for Pain. No current facility-administered medications for this visit.

## 2019-09-05 ENCOUNTER — OFFICE VISIT (OUTPATIENT)
Dept: NON INVASIVE DIAGNOSTICS | Age: 64
End: 2019-09-05
Payer: MEDICARE

## 2019-09-05 VITALS
WEIGHT: 249 LBS | RESPIRATION RATE: 18 BRPM | HEART RATE: 74 BPM | SYSTOLIC BLOOD PRESSURE: 110 MMHG | HEIGHT: 67 IN | BODY MASS INDEX: 39.08 KG/M2 | DIASTOLIC BLOOD PRESSURE: 64 MMHG

## 2019-09-05 DIAGNOSIS — I48.19 PERSISTENT ATRIAL FIBRILLATION (HCC): Chronic | ICD-10-CM

## 2019-09-05 DIAGNOSIS — Z95.0 PACEMAKER: ICD-10-CM

## 2019-09-05 PROCEDURE — 93280 PM DEVICE PROGR EVAL DUAL: CPT | Performed by: INTERNAL MEDICINE

## 2019-09-05 PROCEDURE — 4040F PNEUMOC VAC/ADMIN/RCVD: CPT | Performed by: INTERNAL MEDICINE

## 2019-09-05 PROCEDURE — 1123F ACP DISCUSS/DSCN MKR DOCD: CPT | Performed by: INTERNAL MEDICINE

## 2019-09-05 PROCEDURE — 99214 OFFICE O/P EST MOD 30 MIN: CPT | Performed by: INTERNAL MEDICINE

## 2019-09-05 PROCEDURE — G8427 DOCREV CUR MEDS BY ELIG CLIN: HCPCS | Performed by: INTERNAL MEDICINE

## 2019-09-05 PROCEDURE — 1036F TOBACCO NON-USER: CPT | Performed by: INTERNAL MEDICINE

## 2019-09-05 PROCEDURE — G8417 CALC BMI ABV UP PARAM F/U: HCPCS | Performed by: INTERNAL MEDICINE

## 2019-09-05 PROCEDURE — 3017F COLORECTAL CA SCREEN DOC REV: CPT | Performed by: INTERNAL MEDICINE

## 2019-09-05 RX ORDER — LISINOPRIL 20 MG/1
20 TABLET ORAL DAILY
COMMUNITY
End: 2019-10-29

## 2019-09-11 ENCOUNTER — OFFICE VISIT (OUTPATIENT)
Dept: PULMONOLOGY | Age: 64
End: 2019-09-11
Payer: MEDICARE

## 2019-09-11 VITALS
RESPIRATION RATE: 18 BRPM | HEIGHT: 66 IN | SYSTOLIC BLOOD PRESSURE: 129 MMHG | BODY MASS INDEX: 40.02 KG/M2 | HEART RATE: 82 BPM | WEIGHT: 249 LBS | OXYGEN SATURATION: 92 % | DIASTOLIC BLOOD PRESSURE: 60 MMHG

## 2019-09-11 DIAGNOSIS — G47.33 OSA (OBSTRUCTIVE SLEEP APNEA): Primary | Chronic | ICD-10-CM

## 2019-09-11 DIAGNOSIS — J98.4 RESTRICTIVE LUNG DISEASE SECONDARY TO OBESITY: ICD-10-CM

## 2019-09-11 DIAGNOSIS — E66.9 RESTRICTIVE LUNG DISEASE SECONDARY TO OBESITY: ICD-10-CM

## 2019-09-11 DIAGNOSIS — R91.8 LUNG NODULES: ICD-10-CM

## 2019-09-11 LAB
DLCO %PRED: 40 %
DLCO PRED: 26.95 ML/MIN/MMHG
DLCO/VA %PRED: 74 %
DLCO/VA PRED: 4.34 ML/MIN/MMHG
DLCO/VA: 3.22 ML/MIN/MMHG
DLCO: 10.91 ML/MIN/MMHG
EXPIRATORY TIME: NORMAL SEC
FEF 25-75% %PRED-PRE: 76 L/SEC
FEF 25-75% PRED: 7.66 L/SEC
FEF 25-75%-PRE: 5.85 L/SEC
FEV1 %PRED-PRE: 60 %
FEV1 PRED: 2.94 L
FEV1/FVC %PRED-PRE: 114 %
FEV1/FVC PRED: 77 %
FEV1/FVC: 88 %
FEV1: 1.77 L
FVC %PRED-PRE: 52 %
FVC PRED: 3.82 L
FVC: 2 L
GAW %PRED: NORMAL %
GAW PRED: NORMAL L/S/CMH2O
GAW: NORMAL L/S/CMH2O
IC %PRED: NORMAL %
IC PRED: NORMAL L
IC: NORMAL L
MVV %PRED-PRE: 66 %
MVV PRED: 119 L/MIN
MVV-PRE: 80 L/MIN
PEF %PRED-PRE: NORMAL L/SEC
PEF PRED: NORMAL L/SEC
PEF-PRE: NORMAL L/SEC
RAW %PRED: NORMAL %
RAW PRED: NORMAL CMH2O/L/S
RAW: NORMAL CMH2O/L/S
RV %PRED: NORMAL %
RV PRED: NORMAL L
RV: NORMAL L
SVC %PRED: 60 %
SVC PRED: 3.82 L
SVC: 2.31 L
TLC %PRED: 0 %
TLC PRED: NORMAL L
TLC: NORMAL L
VA %PRED: 54 %
VA PRED: 6.21 L
VA: 3.39 L
VTG %PRED: NORMAL %
VTG PRED: NORMAL L
VTG: NORMAL L

## 2019-09-11 PROCEDURE — 1036F TOBACCO NON-USER: CPT | Performed by: INTERNAL MEDICINE

## 2019-09-11 PROCEDURE — G8417 CALC BMI ABV UP PARAM F/U: HCPCS | Performed by: INTERNAL MEDICINE

## 2019-09-11 PROCEDURE — 99213 OFFICE O/P EST LOW 20 MIN: CPT | Performed by: INTERNAL MEDICINE

## 2019-09-11 PROCEDURE — G8427 DOCREV CUR MEDS BY ELIG CLIN: HCPCS | Performed by: INTERNAL MEDICINE

## 2019-09-11 PROCEDURE — 4040F PNEUMOC VAC/ADMIN/RCVD: CPT | Performed by: INTERNAL MEDICINE

## 2019-09-11 PROCEDURE — 3017F COLORECTAL CA SCREEN DOC REV: CPT | Performed by: INTERNAL MEDICINE

## 2019-09-11 PROCEDURE — 99214 OFFICE O/P EST MOD 30 MIN: CPT | Performed by: INTERNAL MEDICINE

## 2019-09-11 ASSESSMENT — PULMONARY FUNCTION TESTS
FVC: 2.00
FEV1/FVC_PREDICTED: 77
FVC_PERCENT_PREDICTED_PRE: 52
FEV1_PREDICTED: 2.94
FVC_PREDICTED: 3.82
FEV1: 1.77
FEV1_PERCENT_PREDICTED_PRE: 60
FEV1/FVC_PERCENT_PREDICTED_PRE: 114
FEV1/FVC: 88

## 2019-09-11 NOTE — PATIENT INSTRUCTIONS
Patient Education        Learning About CPAP for Sleep Apnea  What is CPAP? CPAP is a small machine that you use at home every night while you sleep. It increases air pressure in your throat to keep your airway open. When you have sleep apnea, this can help you sleep better so you feel much better. CPAP stands for \"continuous positive airway pressure. \"  The CPAP machine will have one of the following:  · A mask that covers your nose and mouth  · Prongs that fit into your nose  · A mask that covers your nose only, the most common type. This type is called NCPAP. The N stands for \"nasal.\"  Why is it done? CPAP is usually the best treatment for obstructive sleep apnea. It is the first treatment choice and the most widely used. Your doctor may suggest CPAP if you have:  · Moderate to severe sleep apnea. · Sleep apnea and coronary artery disease (CAD). · Sleep apnea and heart failure. How does it help? · CPAP can help you have more normal sleep, so you feel less sleepy and more alert during the daytime. · CPAP may help keep heart failure or other heart problems from getting worse. · CPAP may help lower your blood pressure. · If you use CPAP, your bed partner may also sleep better because you are not snoring or restless. What are the side effects? Some people who use CPAP have:  · A dry or stuffy nose and a sore throat. · Irritated skin on the face. · Sore eyes. · Bloating. If you have any of these problems, work with your doctor to fix them. Here are some things you can try:  · Be sure the mask or nasal prongs fit well. · See if your doctor can adjust the pressure of your CPAP. · If your nose is dry, try a humidifier. · If your nose is runny or stuffy, try decongestant medicine or a steroid nasal spray. Be safe with medicines. Read and follow all instructions on the label. Do not use the medicine longer than the label says. If these things do not help, you might try a different type of machine. Some machines have air pressure that adjusts on its own. Others have air pressures that are different when you breathe in than when you breathe out. This may reduce discomfort caused by too much pressure in your nose. Where can you learn more? Go to https://[email or web address].[email or web address]. org and sign in to your alphacityguides account. Enter L721 in the [employer]mark box to learn more about \"Learning About CPAP for Sleep Apnea. \"     If you do not have an account, please click on the \"Sign Up Now\" link. Current as of: September 5, 2018  Content Version: 12.1  © 2165-2293 Healthwise, [employer]. Care instructions adapted under license by [facility] ([facility]). If you have questions about a medical condition or this instruction, always ask your healthcare professional. [address] any warranty or liability for your use of this information.

## 2019-09-11 NOTE — PROGRESS NOTES
lower extremity edema. No lightheadedness or syncope. He is riding his spider motorcycle. He is using [facility] as his insurance will not cover [facility] and his lung function is stable at 63%. We discussed his oxygen concentrators for camping trips. ALLERGIES:  No Known Allergies    PAST MEDICAL HISTORY:       Diagnosis Date    Atrial fibrillation ([address]     Carpal tunnel syndrome     Hyperlipidemia     Hypertension     Hypothyroidism     Lung nodule     Nocturnal hypoxemia due to obesity 8/8/2013    NIKO (obstructive sleep apnea) 8/8/2013    [facility]    Osteoarthritis     Pinched nerve     Lumbar    Restrictive lung disease secondary to obesity 7/25/2016    Sinus node dysfunction (HCC)     Type II or unspecified type diabetes mellitus without mention of complication, not stated as uncontrolled         MEDICATIONS:   Current Outpatient Medications   Medication Sig Dispense Refill    lisinopril (PRINIVIL;ZESTRIL) 20 MG tablet Take 20 mg by mouth daily      blood glucose monitor strips Give one touch verio test strips. Test A.M. And P.M 200 strip 0    metFORMIN (GLUCOPHAGE) 1000 MG tablet TAKE ONE TABLET BY MOUTH TWICE A DAY WITH MEALS 180 tablet 0    levothyroxine (SYNTHROID) 100 MCG tablet TAKE ONE TABLET BY MOUTH EVERY DAY 90 tablet 0    hydrochlorothiazide (HYDRODIURIL) 25 MG tablet TAKE ONE TABLET BY MOUTH EVERY DAY 90 tablet 3    atorvastatin (LIPITOR) 40 MG tablet Take 1 tablet by mouth daily 90 tablet 3    insulin 70-30 (NOVOLIN 70/30) (70-30) 100 UNIT per ML injection vial Inject 20 Units into the skin 2 times daily 6 vial 0    Lancets MISC Give Delica lancets. Tests twice a day A.M.  And P.M 200 each 0    ferrous sulfate 325 (65 Fe) MG tablet Take 1 tablet by mouth daily (with breakfast) 30 tablet 3    Cholecalciferol (VITAMIN D3) 1000 units TABS TAKE TWO TABLETS BY MOUTH EVERY DAY 30 tablet 0    zoster recombinant adjuvanted vaccine (SHINGRIX) 50 MCG/0.5ML SUSR injection 1 scar.   Neurological/Psychiatric: General behavior is normal. Memory is slightly impair on long term recall. Emotional status is normal.  Cranial nerves II-XII are intact. DATA:   The data collected below information that was obtained, reviewed, analyzed and interpreted today. [facility] Office Spirometry was compared to previous test if available and demonstrates an FVC of 2.21 liters which is 57 % of predicted with an FEV1 of 1.93 liters which is 65 % of predicted. FEV1/FVC ratio is 86 %. Mid expiratory flow rates are 95 % of predicted. Maximum voluntary ventilation is 75 liters per minute or 58 % of predicted. Flow volume loop shows no signs of intrathoracic or extrathoracic process. Impression: Moderate Restrictive Pathology    Static lung volumes (2015)  demonstrate an ERV of 0.16 liters which is 15% predicted, TGV of 2.34 liters which is 71% predicted, RV which is 2.18 liters which is 103% predicted, TLC of 4.72 liters which is 74% predicted. RV/TLC ratio is 140% predicted. DLCO is 95% predicted with a DL/VA of 94% predicted when corrected for alveolar ventilation. ECHOCARDIOGRAM: (2017): Left ventricular internal dimensions were normal in diastole and systole.  Borderline concentric left ventricular hypertrophy. Normal left ventricular ejection fraction. Ejection fraction is visually estimated at 70%. Borderline concentric left ventricular hypertrophy.  Normal left ventricular ejection fraction. The left atrium is mildly dilated. Mildly dilated right ventricle. CT scan Right ventricle global systolic function is low normal.  The aortic valve appears moderately sclerotic.     CT SCAN CHEST (2/2019):  LUNGS: The tiny lung nodules which were previously present are stable. No pleural effusion or pneumothorax is seen. HEART: Unremarkable. AORTA: The aorta appears to be unremarkable. MEDIASTINUM: The mediastinum nonspecific adenopathy noted most pronounced in the sub-subcarinal space.  UPPER ABDOMEN: Unremarkable. OTHER:Unremarkable    CT SCAN CHEST (12/2017): Airways appear patent. A few chronic bands of atelectasis or fibrosis in each lower lobe. Solid noncalcified subpleural nodule posterior lateral aspect right upper lobe at the level the yecenia measures about 3.8 mm whereas previously it measured about 3.1 mm. Other smaller peripheral nodular densities in the right upper lobe are stable. Peripheral solid nodule laterally left upper lobe at the level the yecenia measuring about 3.7 mm is unchanged. CT SCAN CHEST (7/2016): We reviewed the films during the inter-disciplinary rounds/conference, which showed mild cardiomegaly with mediastinal lipomatosis and nonspecific lymph nodes in the mediastinum. 2. 4 mm noncalcified nodules in the upper lobe bilaterally which are unchanged since 2012. CT SCAN CHEST (7/2015): IMPRESSION: 1. Indeterminate pulmonary nodules are stable back to August 22,2012. No evidence for new pulmonary nodule, mass, or lymphadenopathy. 2. No acute pleural-parenchymal disease. 3. Borderline heart size. SLEEP STUDY: (2013)  IMPRESSION: 1. Known sleep apnea. 2. Nocturnal hypoxemia. 3. 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 4. No cardiac dysrhythmia. IMPRESSION:    Moise Butt is a 75 y.o. male who is morbidly obese with restrictive lung disease secondary to his obesity along with incidental scattered ground glass pulmonary nodules in a previous smoker and lastly, obstructive sleep apnea, intolerant to CPAP therapy on oxygen at 2.5 liters. With this in mind, we would like to proceed with the following;                  PLAN:    He is feeling ok. We discussed his weight & exercise. He is riding his [employer] motorcycle. As for his restrictive lung disease, his spirometry is stable and his breathing is at baseline. He is intolerant of the CPAP mask and is wearing his O2 at night and he is on 2.5 liter nasal canula.  ([facility]) We had a long discussion regarding untreated NIKO

## 2019-10-03 ENCOUNTER — TELEPHONE (OUTPATIENT)
Dept: CARDIOLOGY | Age: 64
End: 2019-10-03

## 2019-10-07 ENCOUNTER — HOSPITAL ENCOUNTER (OUTPATIENT)
Dept: CARDIOLOGY | Age: 64
Discharge: HOME | End: 2019-10-07
Payer: MEDICARE

## 2019-10-07 DIAGNOSIS — I48.19 PERSISTENT ATRIAL FIBRILLATION (HCC): Chronic | ICD-10-CM

## 2019-10-07 LAB
LV EF: 63 %
LVEF MODALITY: NORMAL

## 2019-10-07 PROCEDURE — 93306 TTE W/DOPPLER COMPLETE: CPT

## 2019-10-11 ENCOUNTER — TELEPHONE (OUTPATIENT)
Dept: NON INVASIVE DIAGNOSTICS | Age: 64
End: 2019-10-11

## 2019-10-29 ENCOUNTER — OFFICE VISIT (OUTPATIENT)
Dept: INTERNAL MEDICINE | Age: 64
End: 2019-10-29
Payer: MEDICARE

## 2019-10-29 VITALS
HEIGHT: 67 IN | BODY MASS INDEX: 39.19 KG/M2 | HEART RATE: 85 BPM | SYSTOLIC BLOOD PRESSURE: 134 MMHG | RESPIRATION RATE: 16 BRPM | OXYGEN SATURATION: 93 % | DIASTOLIC BLOOD PRESSURE: 70 MMHG | TEMPERATURE: 97.8 F | WEIGHT: 249.7 LBS

## 2019-10-29 DIAGNOSIS — E11.43 TYPE 2 DIABETES MELLITUS WITH DIABETIC AUTONOMIC NEUROPATHY, WITH LONG-TERM CURRENT USE OF INSULIN (HCC): ICD-10-CM

## 2019-10-29 DIAGNOSIS — Z79.4 TYPE 2 DIABETES MELLITUS WITH DIABETIC AUTONOMIC NEUROPATHY, WITH LONG-TERM CURRENT USE OF INSULIN (HCC): ICD-10-CM

## 2019-10-29 DIAGNOSIS — E03.9 ACQUIRED HYPOTHYROIDISM: ICD-10-CM

## 2019-10-29 DIAGNOSIS — D64.9 ANEMIA, UNSPECIFIED TYPE: Primary | ICD-10-CM

## 2019-10-29 DIAGNOSIS — E78.5 HYPERLIPIDEMIA, UNSPECIFIED HYPERLIPIDEMIA TYPE: Chronic | ICD-10-CM

## 2019-10-29 DIAGNOSIS — I10 ESSENTIAL HYPERTENSION: ICD-10-CM

## 2019-10-29 LAB — HBA1C MFR BLD: 8.2 %

## 2019-10-29 PROCEDURE — G8417 CALC BMI ABV UP PARAM F/U: HCPCS | Performed by: INTERNAL MEDICINE

## 2019-10-29 PROCEDURE — 99213 OFFICE O/P EST LOW 20 MIN: CPT | Performed by: INTERNAL MEDICINE

## 2019-10-29 PROCEDURE — 4040F PNEUMOC VAC/ADMIN/RCVD: CPT | Performed by: INTERNAL MEDICINE

## 2019-10-29 PROCEDURE — 2022F DILAT RTA XM EVC RTNOPTHY: CPT | Performed by: INTERNAL MEDICINE

## 2019-10-29 PROCEDURE — G8427 DOCREV CUR MEDS BY ELIG CLIN: HCPCS | Performed by: INTERNAL MEDICINE

## 2019-10-29 PROCEDURE — G8484 FLU IMMUNIZE NO ADMIN: HCPCS | Performed by: INTERNAL MEDICINE

## 2019-10-29 PROCEDURE — 1123F ACP DISCUSS/DSCN MKR DOCD: CPT | Performed by: INTERNAL MEDICINE

## 2019-10-29 PROCEDURE — 83036 HEMOGLOBIN GLYCOSYLATED A1C: CPT | Performed by: INTERNAL MEDICINE

## 2019-10-29 PROCEDURE — 3017F COLORECTAL CA SCREEN DOC REV: CPT | Performed by: INTERNAL MEDICINE

## 2019-10-29 PROCEDURE — 99214 OFFICE O/P EST MOD 30 MIN: CPT | Performed by: INTERNAL MEDICINE

## 2019-10-29 PROCEDURE — 1036F TOBACCO NON-USER: CPT | Performed by: INTERNAL MEDICINE

## 2019-10-29 RX ORDER — LISINOPRIL 40 MG/1
TABLET ORAL
Refills: 2 | COMMUNITY
Start: 2019-10-02 | End: 2019-10-29 | Stop reason: SDUPTHER

## 2019-10-29 RX ORDER — ATORVASTATIN CALCIUM 40 MG/1
40 TABLET, FILM COATED ORAL DAILY
Qty: 90 TABLET | Refills: 3 | Status: SHIPPED
Start: 2019-10-29 | End: 2020-02-10 | Stop reason: SDUPTHER

## 2019-10-29 RX ORDER — FERROUS SULFATE 325(65) MG
325 TABLET ORAL
Qty: 30 TABLET | Refills: 3 | Status: SHIPPED
Start: 2019-10-29 | End: 2020-02-10 | Stop reason: ALTCHOICE

## 2019-10-29 RX ORDER — LISINOPRIL 40 MG/1
TABLET ORAL
Qty: 30 TABLET | Refills: 2 | Status: SHIPPED
Start: 2019-10-29 | End: 2020-02-10 | Stop reason: SDUPTHER

## 2019-10-29 RX ORDER — LEVOTHYROXINE SODIUM 112 UG/1
TABLET ORAL
Qty: 90 TABLET | Refills: 1 | Status: SHIPPED
Start: 2019-10-29 | End: 2020-02-10 | Stop reason: SDUPTHER

## 2019-10-29 RX ORDER — HYDROCHLOROTHIAZIDE 25 MG/1
TABLET ORAL
Qty: 90 TABLET | Refills: 3 | Status: SHIPPED
Start: 2019-10-29 | End: 2020-02-10 | Stop reason: SDUPTHER

## 2019-11-11 ENCOUNTER — NURSE ONLY (OUTPATIENT)
Dept: NON INVASIVE DIAGNOSTICS | Age: 64
End: 2019-11-11
Payer: MEDICARE

## 2019-11-11 DIAGNOSIS — Z95.0 PACEMAKER: Primary | ICD-10-CM

## 2019-11-11 DIAGNOSIS — I49.5 SINUS NODE DYSFUNCTION (HCC): ICD-10-CM

## 2019-11-11 DIAGNOSIS — I48.19 PERSISTENT ATRIAL FIBRILLATION (HCC): ICD-10-CM

## 2019-11-11 PROCEDURE — 93296 REM INTERROG EVL PM/IDS: CPT | Performed by: INTERNAL MEDICINE

## 2019-11-11 PROCEDURE — 93294 REM INTERROG EVL PM/LDLS PM: CPT | Performed by: INTERNAL MEDICINE

## 2019-11-26 ENCOUNTER — TELEPHONE (OUTPATIENT)
Dept: NON INVASIVE DIAGNOSTICS | Age: 64
End: 2019-11-26

## 2020-02-06 ENCOUNTER — HOSPITAL ENCOUNTER (OUTPATIENT)
Age: 65
Discharge: HOME | End: 2020-02-06
Payer: MEDICARE

## 2020-02-06 LAB
CHOLESTEROL, TOTAL: 118 MG/DL (ref 0–199)
HCT VFR BLD CALC: 41.9 % (ref 37–54)
HDLC SERPL-MCNC: 27 MG/DL
HEMOGLOBIN: 12.9 G/DL (ref 12.5–16.5)
LDL CHOLESTEROL CALCULATED: 64 MG/DL (ref 0–99)
MCH RBC QN AUTO: 28.9 PG (ref 26–35)
MCHC RBC AUTO-ENTMCNC: 30.8 % (ref 32–34.5)
MCV RBC AUTO: 93.9 FL (ref 80–99.9)
PDW BLD-RTO: 13.7 FL (ref 11.5–15)
PLATELET # BLD: 217 E9/L (ref 130–450)
PMV BLD AUTO: 10 FL (ref 7–12)
RBC # BLD: 4.46 E12/L (ref 3.8–5.8)
TRIGL SERPL-MCNC: 136 MG/DL (ref 0–149)
VLDLC SERPL CALC-MCNC: 27 MG/DL
WBC # BLD: 8.1 E9/L (ref 4.5–11.5)

## 2020-02-06 PROCEDURE — 36415 COLL VENOUS BLD VENIPUNCTURE: CPT

## 2020-02-06 PROCEDURE — 80061 LIPID PANEL: CPT

## 2020-02-06 PROCEDURE — 85027 COMPLETE CBC AUTOMATED: CPT

## 2020-02-10 ENCOUNTER — OFFICE VISIT (OUTPATIENT)
Dept: INTERNAL MEDICINE | Age: 65
End: 2020-02-10
Payer: MEDICARE

## 2020-02-10 VITALS
WEIGHT: 252.4 LBS | RESPIRATION RATE: 16 BRPM | HEART RATE: 68 BPM | HEIGHT: 67 IN | SYSTOLIC BLOOD PRESSURE: 120 MMHG | TEMPERATURE: 98.1 F | BODY MASS INDEX: 39.62 KG/M2 | DIASTOLIC BLOOD PRESSURE: 57 MMHG

## 2020-02-10 LAB — HBA1C MFR BLD: 7.8 %

## 2020-02-10 PROCEDURE — G8482 FLU IMMUNIZE ORDER/ADMIN: HCPCS | Performed by: INTERNAL MEDICINE

## 2020-02-10 PROCEDURE — 3017F COLORECTAL CA SCREEN DOC REV: CPT | Performed by: INTERNAL MEDICINE

## 2020-02-10 PROCEDURE — 3051F HG A1C>EQUAL 7.0%<8.0%: CPT | Performed by: INTERNAL MEDICINE

## 2020-02-10 PROCEDURE — 4040F PNEUMOC VAC/ADMIN/RCVD: CPT | Performed by: INTERNAL MEDICINE

## 2020-02-10 PROCEDURE — 99212 OFFICE O/P EST SF 10 MIN: CPT | Performed by: INTERNAL MEDICINE

## 2020-02-10 PROCEDURE — 83036 HEMOGLOBIN GLYCOSYLATED A1C: CPT | Performed by: INTERNAL MEDICINE

## 2020-02-10 PROCEDURE — 1123F ACP DISCUSS/DSCN MKR DOCD: CPT | Performed by: INTERNAL MEDICINE

## 2020-02-10 PROCEDURE — G0438 PPPS, INITIAL VISIT: HCPCS | Performed by: INTERNAL MEDICINE

## 2020-02-10 RX ORDER — FERROUS SULFATE 325(65) MG
325 TABLET ORAL
Qty: 30 TABLET | Refills: 2 | Status: CANCELLED | OUTPATIENT
Start: 2020-02-10

## 2020-02-10 RX ORDER — HYDROCHLOROTHIAZIDE 25 MG/1
TABLET ORAL
Qty: 90 TABLET | Refills: 2 | Status: SHIPPED
Start: 2020-02-10 | End: 2020-07-28 | Stop reason: SDUPTHER

## 2020-02-10 RX ORDER — LEVOTHYROXINE SODIUM 112 UG/1
TABLET ORAL
Qty: 90 TABLET | Refills: 2 | Status: SHIPPED
Start: 2020-02-10 | End: 2020-07-28 | Stop reason: SDUPTHER

## 2020-02-10 RX ORDER — LISINOPRIL 40 MG/1
TABLET ORAL
Qty: 30 TABLET | Refills: 2 | Status: SHIPPED
Start: 2020-02-10 | End: 2020-05-07 | Stop reason: SDUPTHER

## 2020-02-10 RX ORDER — ATORVASTATIN CALCIUM 40 MG/1
40 TABLET, FILM COATED ORAL DAILY
Qty: 90 TABLET | Refills: 2 | Status: SHIPPED
Start: 2020-02-10 | End: 2020-07-28 | Stop reason: SDUPTHER

## 2020-02-10 ASSESSMENT — LIFESTYLE VARIABLES: HOW OFTEN DO YOU HAVE A DRINK CONTAINING ALCOHOL: 0

## 2020-02-10 ASSESSMENT — PATIENT HEALTH QUESTIONNAIRE - PHQ9
SUM OF ALL RESPONSES TO PHQ QUESTIONS 1-9: 0
SUM OF ALL RESPONSES TO PHQ QUESTIONS 1-9: 0

## 2020-02-10 NOTE — PROGRESS NOTES
I discussed the patient with Dr. Joaquin Purvis. Patient here for annual wellness visit and routine care for chronic medical issues. DM - HbA1c - 8.2 ----7.8  Novolin 25 Units BID. Sugars 160 - 180  HTN - Controlled  Hyperlipidemia - stable. On lipitor. On nightly oxygen for NIKO. Assessment/Plan:  1. AWV - Patient overall doing well. Patient with one fall over past year from tripping. No need for further follow-up on this. 2.  DM - stable with improved HbA1c today at 7.8   Continue Novolin insulin at 25 Units twice daily and Metformin    3. HTN - well controlled    Continue HCTZ and lisinopril    4. Hypothyroidism - stable   Need TSH rechecked and dose adjusted based on the lab    5. Hyperlipidemia - stable on statin   Continue atorvastatin   Need LFTs updated.      Crystal Lopez MD  2/11/2020

## 2020-02-10 NOTE — PATIENT INSTRUCTIONS
Stop taking PO iron   Check labs before next visit  Cont check sugar 2 times at home  Cont follow your eye and foot doctor   Patient Education        Preventing Falls: Care Instructions  Your Care Instructions    Getting around your home safely can be a challenge if you have injuries or health problems that make it easy for you to fall. Loose rugs and furniture in walkways are among the dangers for many older people who have problems walking or who have poor eyesight. People who have conditions such as arthritis, osteoporosis, or dementia also have to be careful not to fall. You can make your home safer with a few simple measures. Follow-up care is a key part of your treatment and safety. Be sure to make and go to all appointments, and call your doctor if you are having problems. It's also a good idea to know your test results and keep a list of the medicines you take. How can you care for yourself at home? Taking care of yourself  · You may get dizzy if you do not drink enough water. To prevent dehydration, drink plenty of fluids, enough so that your urine is light yellow or clear like water. Choose water and other caffeine-free clear liquids. If you have kidney, heart, or liver disease and have to limit fluids, talk with your doctor before you increase the amount of fluids you drink. · Exercise regularly to improve your strength, muscle tone, and balance. Walk if you can. Swimming may be a good choice if you cannot walk easily. · Have your vision and hearing checked each year or any time you notice a change. If you have trouble seeing and hearing, you might not be able to avoid objects and could lose your balance. · Know the side effects of the medicines you take. Ask your doctor or pharmacist whether the medicines you take can affect your balance. Sleeping pills or sedatives can affect your balance. · Limit the amount of alcohol you drink. Alcohol can impair your balance and other senses.   · Ask your outside your shower or tub and near the toilet and sinks. · Use shower chairs and bath benches. · Use a hand-held shower head that will allow you to sit while showering. · Get into a tub or shower by putting the weaker leg in first. Get out of a tub or shower with your strong side first.  · Repair loose toilet seats and consider installing a raised toilet seat to make getting on and off the toilet easier. · Keep your bathroom door unlocked while you are in the shower. Where can you learn more? Go to [email or web address] and sign in to your "[employer] account. Enter [phone] [id number] [phone] in the Asterisk box to learn more about \"Preventing Falls: Care Instructions. \"     If you do not have an account, please click on the \"Sign Up Now\" link. Current as of: August 6, 2019  Content Version: 12.3  © 4049-7982 [employer]. Care instructions adapted under license by [facility] ([facility]). If you have questions about a medical condition or this instruction, always ask your healthcare professional. [address] any warranty or liability for your use of this information.

## 2020-02-10 NOTE — PROGRESS NOTES
Medicare Annual Wellness Visit  Name: Fidel Ziegler Date: 2/10/2020   MRN: [id number] Sex: Male   Age: 75 y.o. Ethnicity: Non-/Non    : 1955 Race: Rock Rocha is here for Annual Exam (Annual Wellness Visit) and Medication Refill    Screenings for behavioral, psychosocial and functional/safety risks, and cognitive dysfunction are all negative except as indicated below. These results, as well as other patient data from the [address] form, are documented in Flowsheets linked to this Encounter. No other complaints     No Known Allergies      Prior to Visit Medications    Medication Sig Taking? Authorizing Provider   metFORMIN (GLUCOPHAGE) 1000 MG tablet TAKE ONE TABLET BY MOUTH TWICE A DAY WITH MEALS Yes Kayleigh Lewis MD   hydrochlorothiazide (HYDRODIURIL) 25 MG tablet TAKE ONE TABLET BY MOUTH EVERY DAY Yes Kayleigh Lewis MD   lisinopril (PRINIVIL;ZESTRIL) 40 MG tablet TAKE ONE TABLET BY MOUTH EVERY DAY Yes Kayleigh Lewis MD   atorvastatin (LIPITOR) 40 MG tablet Take 1 tablet by mouth daily Yes Kayleigh Lewis MD   insulin 70-30 (NOVOLIN 70/30) (70-30) 100 UNIT per ML injection vial Inject 25 Units into the skin 2 times daily Yes Kayleigh Lewis MD   levothyroxine (SYNTHROID) 112 MCG tablet TAKE ONE TABLET BY MOUTH EVERY DAY Yes Kayleigh Lewis MD   apixaban (ELIQUIS) 5 MG TABS tablet Take 1 tablet by mouth 2 times daily PAP medication. Yes Kayleigh Lewis MD   blood glucose monitor strips Give one touch verio test strips. Test A.M. And P.M Yes Kayleigh Lewis MD   Lancets [facility] Give Delica lancets. Tests twice a day A.M.  And P.M Yes Kayleigh Lewis MD   Cholecalciferol (VITAMIN D3) 1000 units TABS TAKE TWO TABLETS BY MOUTH EVERY DAY Yes Kayleigh Lewis MD   zoster recombinant adjuvanted vaccine [facility]) 50 MCG/0.5ML SUSR injection 1 dose now and repeat in 2-6 months Yes Kayleigh Lewis MD   aspirin (ASPIR-LOW) 81 MG EC tablet TAKE ONE TABLET BY MOUTH EVERY DAY Yes Tre Reilly MD   ibuprofen (ADVIL;MOTRIN) 600 MG Maintenance Status  Immunization History   Administered Date(s) Administered    Influenza 11/26/2013    Influenza Virus Vaccine 10/21/2010, 11/26/2013, 10/28/2014, 10/14/2015, 09/21/2016, 10/30/2017    Influenza, Quadv, 6 mo and older, IM (Fluzone, Flulaval) 10/30/2017    Influenza, Quadv, IM, PF (6 mo and older Fluzone, Flulaval, Fluarix, and 3 yrs and older Afluria) 10/01/2018    Influenza, Triv, 3 Years and older, IM (Afluria (5 yrs and older) 09/21/2016    Influenza, Triv, inactivated, subunit, adjuvanted, IM (Fluad 65 yrs and older) 10/18/2018    Pneumococcal Conjugate 13-valent ([id number]) 02/03/2017    Pneumococcal Conjugate 7-valent (Prevnar7) 08/10/2007    Pneumococcal Polysaccharide ([id number]) 08/08/2012, 08/27/2018, 10/01/2018    Td, unspecified formulation 01/01/2005    Tdap (Boostrix, Adacel) 09/21/2016    Zoster Live (Zostavax) 10/23/2015        Health Maintenance   Topic Date Due    Hepatitis B vaccine (1 of 3 - Risk 3-dose series) 06[birth year]28/1972    Shingles Vaccine (2 of 3) 12/18/2015    Annual Wellness Visit (AWV)  06/19/2019    Diabetic retinal exam  03/11/2020    Potassium monitoring  03/26/2020    Creatinine monitoring  03/26/2020    Diabetic foot exam  06/24/2020    Diabetic microalbuminuria test  07/01/2020    TSH testing  07/01/2020    A1C test (Diabetic or Prediabetic)  10/29/2020    Lipid screen  02/06/2021    Colon cancer screen colonoscopy  06/04/2022    DTaP/Tdap/Td vaccine (2 - Td) 09/21/2026    Flu vaccine  Completed    Pneumococcal 65+ years Vaccine  Completed    AAA screen  Completed    Hepatitis C screen  Completed    Hepatitis A vaccine  Aged Out    Hib vaccine  Aged Out    Meningococcal (ACWY) vaccine  Aged Out     Recommendations for [employer] Due: see orders and patient instructions/AVS.  .   Recommended screening schedule for the next 5-10 years is provided to the patient in written form: see Patient Instructions/AVS.    Anmol Muñoz was seen

## 2020-02-11 ENCOUNTER — TELEPHONE (OUTPATIENT)
Dept: INTERNAL MEDICINE | Age: 65
End: 2020-02-11

## 2020-02-12 ENCOUNTER — NURSE ONLY (OUTPATIENT)
Dept: NON INVASIVE DIAGNOSTICS | Age: 65
End: 2020-02-12
Payer: MEDICARE

## 2020-02-12 PROCEDURE — 93296 REM INTERROG EVL PM/IDS: CPT | Performed by: INTERNAL MEDICINE

## 2020-02-12 PROCEDURE — 93294 REM INTERROG EVL PM/LDLS PM: CPT | Performed by: INTERNAL MEDICINE

## 2020-02-19 NOTE — PROGRESS NOTES
See [facility] [place] report. Remote monitoring reviewed over a 90 day period. End of 90 day monitoring period date of service 2.12.2020. Successful transmission. Patient notified of next scheduled remote.

## 2020-02-26 ENCOUNTER — OFFICE VISIT (OUTPATIENT)
Dept: BARIATRICS/WEIGHT MGMT | Age: 65
End: 2020-02-26
Payer: MEDICARE

## 2020-02-26 VITALS
WEIGHT: 253.8 LBS | TEMPERATURE: 97.4 F | SYSTOLIC BLOOD PRESSURE: 148 MMHG | HEIGHT: 66 IN | BODY MASS INDEX: 40.79 KG/M2 | HEART RATE: 86 BPM | DIASTOLIC BLOOD PRESSURE: 68 MMHG

## 2020-02-26 PROCEDURE — 99205 OFFICE O/P NEW HI 60 MIN: CPT | Performed by: INTERNAL MEDICINE

## 2020-02-26 PROCEDURE — 3017F COLORECTAL CA SCREEN DOC REV: CPT | Performed by: INTERNAL MEDICINE

## 2020-02-26 PROCEDURE — 1123F ACP DISCUSS/DSCN MKR DOCD: CPT | Performed by: INTERNAL MEDICINE

## 2020-02-26 PROCEDURE — 2022F DILAT RTA XM EVC RTNOPTHY: CPT | Performed by: INTERNAL MEDICINE

## 2020-02-26 PROCEDURE — 3051F HG A1C>EQUAL 7.0%<8.0%: CPT | Performed by: INTERNAL MEDICINE

## 2020-02-26 PROCEDURE — G8427 DOCREV CUR MEDS BY ELIG CLIN: HCPCS | Performed by: INTERNAL MEDICINE

## 2020-02-26 PROCEDURE — 99202 OFFICE O/P NEW SF 15 MIN: CPT

## 2020-02-26 PROCEDURE — G8417 CALC BMI ABV UP PARAM F/U: HCPCS | Performed by: INTERNAL MEDICINE

## 2020-02-26 PROCEDURE — G8482 FLU IMMUNIZE ORDER/ADMIN: HCPCS | Performed by: INTERNAL MEDICINE

## 2020-02-26 PROCEDURE — 4040F PNEUMOC VAC/ADMIN/RCVD: CPT | Performed by: INTERNAL MEDICINE

## 2020-02-26 PROCEDURE — 1036F TOBACCO NON-USER: CPT | Performed by: INTERNAL MEDICINE

## 2020-02-26 RX ORDER — FERROUS SULFATE 325(65) MG
65 TABLET ORAL DAILY
COMMUNITY
Start: 2020-02-14 | End: 2020-03-23 | Stop reason: SDUPTHER

## 2020-02-26 NOTE — PROGRESS NOTES
CC -   T2DM, Obesity    Background -  Medical problems: T2DM, Obesity, HTN, HLD, Chronic back pain, Right hip pain, NIKO, Lung nodule, Hypothyroidism, Afib  Medications: Insulin 70/30, Metformin, Dulaglutide, Apixiban, HCTZ, Lisinopril, ASA    T2DM:  Present since age 46, Severe (on insulin), A1c 7.8% 2/10/20, Regimen: Insulin 70/30 25 units bid, Metformin 1000 mg twice daily, Dulaglutide 0.75 mg weekly, Fingerstick readings: Fasting mid-100's, HS upper-100's; Hypoglycemia: none, + good awareness, Obesity is worsening his insulin resistance and glycemic control  Obesity: Present since early 50's; BMI 51.59, wt 253.8 lbs; HS grad wt: 135 lbs, Highest wt: 253 lbs; Lowest wt: 135 lbs; Pattern of wt gain: no change until age 34, then grad increase; Wt change past yr: +5 lbs; Most wt lost: none; Diets attempted: eating less     Diet:    Number of meals per day - 3    Number of snacks per day - 1-2    Meal volume - 12\" plate, 0 seconds    Fast food/convenience store - 4x/week    Restaurants (not fast food) - 0x/week   Sweets - 0d/week   Chips - 0-1d/week (one handful)   Crackers/pretzels - 2d/week (1/2 sleeve of crackers)   Nuts - 7d/week (2 handfuls)   Peanut Butter - 0d/week   Popcorn - 4d/week (one large bowl, buttered, stove tob)   Dried fruit - 0d/week   Whole fruit - 0d/week   Breakfast cereal - 3d/week (Shredded wheat, Cream of Wheat)   Granola/Protein/Energy bar - 0d/week   Sugar sweetened beverages - none   Protein - No supplements   Fiber - No supplements     Exercise:    Gym membership - none    Walking - none    Running - none    Resistance - none    Aerobic class - none    Vital signs -  BP (!) 148/68 (Site: Left Upper Arm, Position: Sitting, Cuff Size: Medium Adult)   Pulse 86   Temp 97.4 °F (36.3 °C)   Ht 5' 5.5\" (1.664 m)   Wt 253 lb 12.8 oz (115.1 kg)   BMI 41.59 kg/m²     I spent 45 minutes in a face to face encounter with Clive Rocha today.    >30 minutes of this was in education and counseling regarding the six types of interventions for weight reduction, expected rates of weight loss and rules of elimination. He does not have a smart phone and does not want to count. Therefore calorie counting is not an option. He is unable to exercise b/c of his back and hip pain. After discussion he prefers to try a low carb diet. I will have him meet with our dietician to guide his food choices in this. We also discussed weight loss drugs. These are too expensive for him, however. The plan then will be: Target carb limit: <60 grams/day  Stop all restaurant and convenience/truck stop food. Avoid potatoes, corn, rice and pasta. Limit bread to one piece per day. Limit crackers to 10 saltine crackers per day  Limit beans to 1 cup per day (no baked beans)  Limit chili to 1 cup per day. Focus food choices on meats, salads (without croutons and only two tablespoons of salad dressing), low fat cheese and eggs. Avoid fruit. Limit nuts to 2 handfuls per day    When starting the above diet: reduce the Insulin 70/30 to 5 units twice daily    Call me with low blood sugars or high sugars: [phone]    See the dietician for assistance with food choices and meal planning  Prior to seeing him, keep a careful food log for two typical days (one work day and one weekend day). Log all foods and their amounts (volume or weight), carb/fat/protein content, and calore content for the foods, meals and day. Make two lists: one of the top 5 foods important for you to include in your weekly diet routine and one of the top 5 foods commonly eaten by people that you dislike.     See me back in 6-8 weeks    Candy Alves MD  Endocrinology/obesity  2/26/20

## 2020-03-05 ENCOUNTER — OFFICE VISIT (OUTPATIENT)
Dept: PULMONOLOGY | Age: 65
End: 2020-03-05
Payer: MEDICARE

## 2020-03-05 ENCOUNTER — TELEPHONE (OUTPATIENT)
Dept: INTERNAL MEDICINE | Age: 65
End: 2020-03-05

## 2020-03-05 VITALS
RESPIRATION RATE: 18 BRPM | DIASTOLIC BLOOD PRESSURE: 70 MMHG | HEIGHT: 66 IN | OXYGEN SATURATION: 91 % | WEIGHT: 256 LBS | HEART RATE: 84 BPM | SYSTOLIC BLOOD PRESSURE: 157 MMHG | BODY MASS INDEX: 41.14 KG/M2

## 2020-03-05 LAB
DLCO %PRED: 39 %
DLCO PRED: 26.95 ML/MIN/MMHG
DLCO/VA %PRED: 112 %
DLCO/VA PRED: 4.34 ML/MIN/MMHG
DLCO/VA: 4.89 ML/MIN/MMHG
DLCO: 10.58 ML/MIN/MMHG
EXPIRATORY TIME: NORMAL
FEF 25-75% %PRED-PRE: NORMAL
FEF 25-75% PRED: NORMAL
FEF 25-75%-PRE: NORMAL
FEV1 %PRED-PRE: 52 %
FEV1 PRED: 2.93 L
FEV1/FVC %PRED-PRE: 115 %
FEV1/FVC PRED: 77 %
FEV1/FVC: 89 %
FEV1: 1.55 L
FVC %PRED-PRE: 46 %
FVC PRED: 3.8 L
FVC: 1.75 L
GAW %PRED: NORMAL
GAW PRED: NORMAL
GAW: NORMAL
IC %PRED: NORMAL
IC PRED: NORMAL
IC: NORMAL
MVV %PRED-PRE: NORMAL
MVV PRED: NORMAL
MVV-PRE: NORMAL
PEF %PRED-PRE: NORMAL
PEF PRED: NORMAL
PEF-PRE: NORMAL
RAW %PRED: NORMAL
RAW PRED: NORMAL
RAW: NORMAL
RV %PRED: NORMAL
RV PRED: NORMAL
RV: NORMAL
SVC %PRED: NORMAL
SVC PRED: NORMAL
SVC: NORMAL
TLC %PRED: 0 %
TLC PRED: NORMAL
TLC: NORMAL
VA %PRED: NORMAL
VA PRED: NORMAL
VA: NORMAL
VTG %PRED: NORMAL
VTG PRED: NORMAL
VTG: NORMAL

## 2020-03-05 PROCEDURE — 99214 OFFICE O/P EST MOD 30 MIN: CPT | Performed by: INTERNAL MEDICINE

## 2020-03-05 PROCEDURE — 94070 EVALUATION OF WHEEZING: CPT | Performed by: INTERNAL MEDICINE

## 2020-03-05 PROCEDURE — 99213 OFFICE O/P EST LOW 20 MIN: CPT | Performed by: INTERNAL MEDICINE

## 2020-03-05 ASSESSMENT — PULMONARY FUNCTION TESTS
FEV1_PREDICTED: 2.93
FEV1/FVC_PREDICTED: 77
FVC_PREDICTED: 3.80
FEV1: 1.55
FVC: 1.75
FEV1_PERCENT_PREDICTED_PRE: 52
FEV1/FVC: 89
FVC_PERCENT_PREDICTED_PRE: 46
FEV1/FVC_PERCENT_PREDICTED_PRE: 115

## 2020-03-05 NOTE — TELEPHONE ENCOUNTER
Called patient to discuss transition from 70/30 to basaglar as a PAP medication for coverage. Last dosing on chart for  insulin 70-30 25 units BID -- noting Dr. Lebron Blevins recommending if he complies with dietary changes to reduce to 20 units BID. Patient is taking 70/30 20 units BID.  He is agreeable to start Basaglar 32 units qhs through the prescription assistance program.     Electronically signed by Yifan Bentley DO on 3/6/2020 at 2:05 PM

## 2020-03-05 NOTE — PROGRESS NOTES
[place]  Department of Pulmonary, Critical Care and Sleep Medicine  [facility]  Department of Internal Medicine  Office Note    Dear Sue Quinn MD    We had the pleasure of seeing Thu Gaspar, in the [address] at [address] [place] [address] regarding his lung nodules, morbid obesity with restrictive lung (TLC 74%) disease and sleep apnea (not tolerated). HISTORY OF PRESENT ILLNESS:    Thu Gaspar is a 75 y.o. male with a past medical history of HTN, HLP, DM, hypothyroid, OA, carpal tunnel S/p surgery, restrictive lung problems and NIKO intolerant of CPAP therapy. He also has a history of lung nodules that are stable since 2012 with continued surveillance. We reviewed his CT of the chest which demonstrates stable lung nodules since 2012 and we will continue surveillance yearly. We had a long discussion regarding untreated NIKO and not tolerating his CPAP. In 2017, he underwent back surgery which led to prolonged respiratory failure most likely due to upper airway obstruction, volume overload and pain medication. The surgery occurred at [facility] and his wife requested him to be transferred to [address] [place] [address] where he was able to be extubated and went on to rehabilitation. He is home now but unfortunately the back surgery did not resolve his problems. He has lost 20 pounds from this event and he continues only to use the oxygen only at night. In 2019, he had a pacer placed by EP for sinus rodolfo dysfunction. Mr. Sendy March presents to the office today for follow up. He has been doing well with minimal shortness of breath with activity. He has no coughing or wheezing. He continues to refuse CPAP therapy but is wearing O2 at 2.5 L nasal/canula at night. He states he is complaint most nights. No chest pain, palpitations or lower extremity edema. No lightheadedness or syncope.   He is riding his spider as well as social security income    SOCIAL HISTORY: Age-appropriate past and current activities are:  Social History     Tobacco Use    Smoking status: Former Smoker     Packs/day: 0.10     Years: 35.00     Pack years: 3.50     Types: Cigarettes     Last attempt to quit: 2004     Years since quitting: 15.2    Smokeless tobacco: Former User   Substance Use Topics    Alcohol use: No     Alcohol/week: 0.0 standard drinks    Drug use: No       SURGICAL HISTORY:   Past Surgical History:   Procedure Laterality Date    BACK SURGERY      [facility]. Emilene Kanner nerve in back    BACK SURGERY  2017    COLONOSCOPY  2007    multiple colon polyps    COLONOSCOPY  12    COLONOSCOPY    EYE SURGERY      lennie cataracts implant    HERNIA REPAIR      umbilical    PACEMAKER PLACEMENT  10/03/2017    Medtronic dual chamber    Dr. Nery Blandon        FAMILY HISTORY: A review of medical events in the patient's family , including disease which may be hereditary or place the patient at risk were reviewed. Family History   Problem Relation Age of Onset    Cancer Mother     Heart Disease Mother     High Blood Pressure Father     Cancer Brother     High Blood Pressure Brother     Diabetes Brother       Family Status   Relation Name Status    Mother      Father      Sister malka Alive    Brother lisa Alive    Sister sarah Alive    Sister pat Alive    Sister Martha Rodriguez Alive    Brother lucrecia     Brother mars Alive    Brother renay Alive        REVIEW OF SYSTEMS: The patients health assessment form was reviewed. Constitutional: General health fair . The patient complains of weight changes. The patient denies any recent fevers or weakness. Head: Patient denies any history of trauma, convulsive disorder or syncope. Skin:  Patient denies history of changes in pigmentation, eruptions or pruritus.     Eyes: Patient denies any history of color blindness, moodiness, depression or anxiety. He denies obsessions, delusions, illusions or hallucinations. Cancer: No history of cancer reported. Endocrine: No history of goiter. No history of thyroid disorders. He reports diabetes. He is taking 70/30 insulin 20 units at bedtime, reports BG of 100-110's in the morning without overnight hypoglycemic symptoms. He is Rx 12 units qAM and 8 units qHS. His last A1c was 6.3 from 10/3/17. Hematopoietic:  He denies history of bleeding disorders or easy bruising. He denies hypercoagulable disorder. Integumentory: No skin lesion, rashes, venous stasis or varicose veins. They denies any report of skin cancer. Rheumatic:  The patient denies polyarthritis or inflammatory joint disease. PHYSICAL EXAMINATION:   Vitals:    03/05/20 0901   BP: (!) 157/70   Pulse: 84   Resp: 18   SpO2: 91%   Weight: 256 lb (116.1 kg)   Height: 5' 6\" (1.676 m)     Constitutional: A morbid obese  75 y.o. male who is alert, oriented, cooperative and in no apparent distress. Head was normocephalic and atraumatic. EENT: EOMI ESTRELLA. MMM. No icterus. No conjunctival injections. Septum was midline, mucosa was without erythema, exudates or cobblestoning. No thrush. Neck: Supple without thyromegaly. No elevated JVP. Trachea was midline. No carotid bruits. Respiratory: Symmetrical without dullness to percussion. Faint crackles on right. No wheezes, rhonchi. No intercostal retraction or use of accessory muscles. No egophony. Cardiovascular: Nonpalpable PMI. Regular without murmur, clicks, gallops or rubs. No left or right ventricular heave. Pacer in situ  Pulses:  Carotid, radial and femoral pulses were equal bilaterally. Abdomen: Obese, rounded and soft without organomegaly. No rebound, rigidity. Lymphatic: No lymphadenopathy. Musculoskeletal: Ambulates without assistance. Flattened lordotic curvature of the spine. No involuntary movements. Extremities: Tremor noted bilateral hands. Trace lower extremity edema. No ulcerations, tenderness. Noted varicosities & venous statis/ erythema. Sensory function sensation is very diminished. Skin:  Warm and dry. Poor skin color, turgor. No bruises or skin rashes. Old surgical scars are noted. Back surgery scar. Neurological/Psychiatric: General behavior is normal. Memory is slightly impair on long term recall. Emotional status is normal.  Cranial nerves II-XII are intact. DATA:   The data collected below information that was obtained, reviewed, analyzed and interpreted today. Todays Office Spirometry was compared to previous test if available and demonstrates an FVC of 2.21 liters which is 57 % of predicted with an FEV1 of 1.93 liters which is 65 % of predicted. FEV1/FVC ratio is 86 %. Mid expiratory flow rates are 95 % of predicted. Maximum voluntary ventilation is 75 liters per minute or 58 % of predicted. Flow volume loop shows no signs of intrathoracic or extrathoracic process. Impression: Moderate Restrictive Pathology    Static lung volumes (2015)  demonstrate an ERV of 0.16 liters which is 15% predicted, TGV of 2.34 liters which is 71% predicted, RV which is 2.18 liters which is 103% predicted, TLC of 4.72 liters which is 74% predicted. RV/TLC ratio is 140% predicted. DLCO is 95% predicted with a DL/VA of 94% predicted when corrected for alveolar ventilation. ECHOCARDIOGRAM: (2017): Left ventricular internal dimensions were normal in diastole and systole.  Borderline concentric left ventricular hypertrophy. Normal left ventricular ejection fraction. Ejection fraction is visually estimated at 70%. Borderline concentric left ventricular hypertrophy.  Normal left ventricular ejection fraction. The left atrium is mildly dilated. Mildly dilated right ventricle. CT scan Right ventricle global systolic function is low normal.  The aortic valve appears moderately sclerotic.     CT SCAN CHEST (2/2019):  LUNGS: The tiny lung nodules which were previously present are stable. No pleural effusion or pneumothorax is seen. HEART: Unremarkable. AORTA: The aorta appears to be unremarkable. MEDIASTINUM: The mediastinum nonspecific adenopathy noted most pronounced in the sub-subcarinal space. UPPER ABDOMEN: Unremarkable. OTHER:Unremarkable    CT SCAN CHEST (12/2017): Airways appear patent. A few chronic bands of atelectasis or fibrosis in each lower lobe. Solid noncalcified subpleural nodule posterior lateral aspect right upper lobe at the level the yecenia measures about 3.8 mm whereas previously it measured about 3.1 mm. Other smaller peripheral nodular densities in the right upper lobe are stable. Peripheral solid nodule laterally left upper lobe at the level the yecenia measuring about 3.7 mm is unchanged. CT SCAN CHEST (7/2016): We reviewed the films during the inter-disciplinary rounds/conference, which showed mild cardiomegaly with mediastinal lipomatosis and nonspecific lymph nodes in the mediastinum. 2. 4 mm noncalcified nodules in the upper lobe bilaterally which are unchanged since 2012. CT SCAN CHEST (7/2015): IMPRESSION: 1. Indeterminate pulmonary nodules are stable back to August 22,2012. No evidence for new pulmonary nodule, mass, or lymphadenopathy. 2. No acute pleural-parenchymal disease. 3. Borderline heart size. SLEEP STUDY: (2013)  IMPRESSION: 1. Known sleep apnea. 2. Nocturnal hypoxemia. 3. 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 4. No cardiac dysrhythmia. IMPRESSION:    Skye Valencia is a 75 y.o. male who is morbidly obese with restrictive lung disease secondary to his obesity along with incidental pulmonary nodules in a previous smoker and lastly, obstructive sleep apnea, intolerant to CPAP therapy on oxygen at 2.5 liters. With this in mind, we would like to proceed with the following;                  PLAN:    He is feeling ok. We discussed his weight, BP & exercise. He is riding his [employer] motorcycle.   As for his

## 2020-03-05 NOTE — PATIENT INSTRUCTIONS
numbers will appear on the screen. · Write your numbers in your log book, along with the date and time. Manual blood pressure monitors  · Place the earpieces of a stethoscope in your ears, and place the bell of the stethoscope over the artery, just below the cuff. · Close the valve on the rubber inflating bulb. · Squeeze the bulb rapidly with your opposite hand to inflate the cuff until the dial or column of mercury reads about 30 mm Hg higher than your usual systolic pressure. If you do not know your usual pressure, inflate the cuff to 210 mm Hg or until the pulse at your wrist disappears. · Open the pressure valve just slightly by twisting or pressing the valve on the bulb. · As you watch the pressure slowly fall, note the level on the dial at which you first start to hear a pulsing or tapping sound through the stethoscope. This is your systolic blood pressure. · Continue letting the air out slowly. The sounds will become muffled and will finally disappear. Note the pressure when the sounds completely disappear. This is your diastolic blood pressure. Let out all the remaining air. · Write your numbers in your log book, along with the date and time. What else should you know about the test?  It is more accurate to take the average of several readings made throughout the day than to rely on a single reading. It's normal for blood pressure to go up and down throughout the day. Follow-up care is a key part of your treatment and safety. Be sure to make and go to all appointments, and call your doctor if you are having problems. It's also a good idea to keep a list of the medicines you take. Where can you learn more? Go to [email or web address] and sign in to your [employer] account. Enter C427 in the [employer] box to learn more about \"Home Blood Pressure Test: About This Test.\"     If you do not have an account, please click on the \"Sign Up Now\" link.   Current as of: April 9,

## 2020-03-10 ENCOUNTER — HOSPITAL ENCOUNTER (OUTPATIENT)
Age: 65
Discharge: HOME | End: 2020-03-10
Payer: MEDICARE

## 2020-03-10 LAB — TSH SERPL DL<=0.05 MIU/L-ACNC: 5.42 UIU/ML (ref 0.27–4.2)

## 2020-03-10 PROCEDURE — 84443 ASSAY THYROID STIM HORMONE: CPT

## 2020-03-10 PROCEDURE — 36415 COLL VENOUS BLD VENIPUNCTURE: CPT

## 2020-03-17 RX ORDER — FERROUS SULFATE 325(65) MG
65 TABLET ORAL DAILY
Status: CANCELLED | OUTPATIENT
Start: 2020-03-17

## 2020-03-23 RX ORDER — FERROUS SULFATE 325(65) MG
65 TABLET ORAL DAILY
Qty: 30 TABLET | Refills: 0 | Status: SHIPPED
Start: 2020-03-23 | End: 2021-02-23

## 2020-04-15 ENCOUNTER — TELEPHONE (OUTPATIENT)
Dept: BARIATRICS/WEIGHT MGMT | Age: 65
End: 2020-04-15

## 2020-04-28 RX ORDER — APIXABAN 5 MG/1
TABLET, FILM COATED ORAL
Qty: 180 TABLET | Refills: 0 | Status: SHIPPED
Start: 2020-04-28 | End: 2020-07-28 | Stop reason: SDUPTHER

## 2020-05-07 RX ORDER — LISINOPRIL 40 MG/1
TABLET ORAL
Qty: 30 TABLET | Refills: 2 | Status: SHIPPED
Start: 2020-05-07 | End: 2020-07-28 | Stop reason: SDUPTHER

## 2020-05-14 ENCOUNTER — NURSE ONLY (OUTPATIENT)
Dept: NON INVASIVE DIAGNOSTICS | Age: 65
End: 2020-05-14
Payer: MEDICARE

## 2020-05-14 PROCEDURE — 93294 REM INTERROG EVL PM/LDLS PM: CPT | Performed by: INTERNAL MEDICINE

## 2020-05-14 PROCEDURE — 93296 REM INTERROG EVL PM/IDS: CPT | Performed by: INTERNAL MEDICINE

## 2020-05-20 RX ORDER — BLOOD SUGAR DIAGNOSTIC
STRIP MISCELLANEOUS
Qty: 100 EACH | Refills: 2 | Status: SHIPPED
Start: 2020-05-20 | End: 2020-11-10 | Stop reason: SDUPTHER

## 2020-05-20 NOTE — TELEPHONE ENCOUNTER
Last Appointment:  5/3/2019  Future Appointments   Date Time Provider Stacie Kumari   7/28/2020 10:30 AM Yobani Moore MD [facility]   8/13/2020  9:00 AM SCHEDULE, REMOTE CHECK MARKUS ELECTRO PHYS [facility]   9/17/2020  9:00 AM Abbi Leong DO [facility]

## 2020-07-28 ENCOUNTER — OFFICE VISIT (OUTPATIENT)
Dept: INTERNAL MEDICINE | Age: 65
End: 2020-07-28
Payer: MEDICARE

## 2020-07-28 VITALS
SYSTOLIC BLOOD PRESSURE: 139 MMHG | BODY MASS INDEX: 39.24 KG/M2 | OXYGEN SATURATION: 96 % | HEIGHT: 67 IN | DIASTOLIC BLOOD PRESSURE: 75 MMHG | TEMPERATURE: 97.7 F | RESPIRATION RATE: 16 BRPM | HEART RATE: 66 BPM | WEIGHT: 250 LBS

## 2020-07-28 LAB — HBA1C MFR BLD: 8.2 %

## 2020-07-28 PROCEDURE — 99213 OFFICE O/P EST LOW 20 MIN: CPT | Performed by: INTERNAL MEDICINE

## 2020-07-28 PROCEDURE — 99212 OFFICE O/P EST SF 10 MIN: CPT | Performed by: INTERNAL MEDICINE

## 2020-07-28 PROCEDURE — 3052F HG A1C>EQUAL 8.0%<EQUAL 9.0%: CPT | Performed by: INTERNAL MEDICINE

## 2020-07-28 PROCEDURE — 83036 HEMOGLOBIN GLYCOSYLATED A1C: CPT | Performed by: INTERNAL MEDICINE

## 2020-07-28 RX ORDER — INSULIN GLARGINE 100 [IU]/ML
35 INJECTION, SOLUTION SUBCUTANEOUS NIGHTLY
Qty: 6 PEN | Refills: 5 | Status: SHIPPED
Start: 2020-07-28 | End: 2020-11-10 | Stop reason: SDUPTHER

## 2020-07-28 RX ORDER — BLOOD SUGAR DIAGNOSTIC
STRIP MISCELLANEOUS
COMMUNITY
End: 2020-07-28 | Stop reason: SDUPTHER

## 2020-07-28 RX ORDER — ATORVASTATIN CALCIUM 40 MG/1
40 TABLET, FILM COATED ORAL DAILY
Qty: 90 TABLET | Refills: 2 | Status: SHIPPED
Start: 2020-07-28 | End: 2021-05-07

## 2020-07-28 RX ORDER — HYDROCHLOROTHIAZIDE 25 MG/1
TABLET ORAL
Qty: 90 TABLET | Refills: 2 | Status: SHIPPED
Start: 2020-07-28 | End: 2021-03-09 | Stop reason: SDUPTHER

## 2020-07-28 RX ORDER — LEVOTHYROXINE SODIUM 112 UG/1
TABLET ORAL
Qty: 90 TABLET | Refills: 2 | Status: SHIPPED
Start: 2020-07-28 | End: 2021-05-07

## 2020-07-28 RX ORDER — LISINOPRIL 40 MG/1
TABLET ORAL
Qty: 30 TABLET | Refills: 2 | Status: SHIPPED
Start: 2020-07-28 | End: 2020-11-06

## 2020-07-28 RX ORDER — BLOOD SUGAR DIAGNOSTIC
STRIP MISCELLANEOUS
Qty: 100 EACH | Refills: 3 | Status: SHIPPED | OUTPATIENT
Start: 2020-07-28 | End: 2021-09-27 | Stop reason: SDUPTHER

## 2020-07-28 NOTE — PROGRESS NOTES
tablets by mouth daily 30 tablet 0    insulin glargine (BASAGLAR KWIKPEN) 100 UNIT/ML injection pen Inject 32 Units into the skin nightly . PAP medication 5 pen 3    Dulaglutide 0.75 MG/0.5ML SOPN Inject 0.75 mg into the skin once a week      glucagon 1 MG injection  to inject 1 mg into muscle if you become unconscious for any reason other than suspected hyperglycemia 1 kit 3    glucose 4 g chewable tablet Take 4 tablets by mouth as needed for Low blood sugar 60 tablet 3    metFORMIN (GLUCOPHAGE) 1000 MG tablet TAKE ONE TABLET BY MOUTH TWICE A DAY WITH MEALS 180 tablet 2    hydrochlorothiazide (HYDRODIURIL) 25 MG tablet TAKE ONE TABLET BY MOUTH EVERY DAY 90 tablet 2    atorvastatin (LIPITOR) 40 MG tablet Take 1 tablet by mouth daily 90 tablet 2    levothyroxine (SYNTHROID) 112 MCG tablet TAKE ONE TABLET BY MOUTH EVERY DAY 90 tablet 2    Lancets MISC Give Delica lancets. Tests twice a day A.M. And P.M 200 each 0    Cholecalciferol (VITAMIN D3) 1000 units TABS TAKE TWO TABLETS BY MOUTH EVERY DAY 30 tablet 0    zoster recombinant adjuvanted vaccine (SHINGRIX) 50 MCG/0.5ML SUSR injection 1 dose now and repeat in 2-6 months 0.5 mL 0    aspirin (ASPIR-LOW) 81 MG EC tablet TAKE ONE TABLET BY MOUTH EVERY DAY 30 tablet 2    ibuprofen (ADVIL;MOTRIN) 600 MG tablet Take 1 tablet by mouth every 8 hours as needed for Pain 60 tablet 1    mometasone-formoterol (DULERA) 200-5 MCG/ACT inhaler Inhale 2 puffs into the lungs 2 times daily Rinse mouth after using. PAP medication. 3 Inhaler 3    Nebulizers (NEBULIZER COMPRESSOR) MISC Please provide patient with nebulizer and tubing needed to use equipment. 1 each 0    OXYGEN Inhale 2.5 L into the lungs nightly (Patient taking differently:   Inhale 2.5 L into the lungs nightly HCS) 1 Device 99    acetaminophen (TYLENOL) 500 MG tablet Take 1,000 mg by mouth daily as needed for Pain.          I have reviewed all pertinent PMHx, PSHx, FamHx, SocialHx, medications, and allergies and updated history as appropriate. OBJECTIVE:    VS: /75   Pulse 66   Temp 97.7 °F (36.5 °C) (Oral)   Resp 16   Ht 5' 7\" (1.702 m)   Wt 250 lb (113.4 kg)   SpO2 96%   BMI 39.16 kg/m²   General appearance: Alert, Awake, Oriented times 3, no distress  Lungs: Lungs clear to auscultation bilaterally. No rhonchi, crackles or wheezes  Heart: S1 S2  Regular rate and rhythm. No rub, murmur or gallop  Abdomen: Abdomen soft but obese, non-tender. non-distended BS normal. No masses, organomegaly, no guarding rebound or rigidity. Extremities: No edema, Peripheral pulses palpable 2/4    ASSESSMENT/PLAN:  1. Essential hypertension  Controlled   - lisinopril (PRINIVIL;ZESTRIL) 40 MG tablet; One tablet daily  Dispense: 30 tablet; Refill: 2  - hydroCHLOROthiazide (HYDRODIURIL) 25 MG tablet; TAKE ONE TABLET BY MOUTH EVERY DAY  Dispense: 90 tablet; Refill: 2    2. Type 2 diabetes mellitus with hyperglycemia, with long-term current use of insulin ([facility])  Will increase morning basaglar to 35 U  - insulin glargine (BASAGLAR KWIKPEN) 100 UNIT/ML injection pen; Inject 35 Units into the skin nightly . PAP medication  Dispense: 6 pen; Refill: 5  - Insulin Pen Needle (PEN NEEDLES) 32G X 6 MM MISC; 2 times a day, in the morning and before bed  Dispense: 100 each; Refill: 3  - POCT glycosylated hemoglobin (Hb A1C) 8.2   - atorvastatin (LIPITOR) 40 MG tablet; Take 1 tablet by mouth daily  Dispense: 90 tablet; Refill: 2  - metFORMIN (GLUCOPHAGE) 1000 MG tablet; TAKE ONE TABLET BY MOUTH TWICE A DAY WITH MEALS  Dispense: 180 tablet; Refill: 2    3. Acquired hypothyroidism  Last TSH in Feb borderline high  - levothyroxine (SYNTHROID) 112 MCG tablet; TAKE ONE TABLET BY MOUTH EVERY DAY  Dispense: 90 tablet; Refill: 2    4. Other iron deficiency anemia  - CBC; Future    5.  At high risk for falls  Educated about home safety   On the basis of positive falls risk screening, assessment and plan is as follows: home safety tips provided.     6. Tremor   Essential vs physiological    73-68 Men  - DM screening if over weight or obese 38-69 yo  -One time AAA screening ultrasound for smokers >5 pack's lifetime Yes  -ETOH misuse No  -Colon cancer screening Dr. Marlen Seo 2012  2 mm polyp which was biopsied  -recommended exercise for falls prevention Yes  -one-time screening for HCV infection to adults born between 1945 and [address]  -Annual lung cancer 55 to 80 years who have a 30 pack-year smoking history and currently smoke or have quit within the past 15 years yes     Health Maintenance Due   Topic Date Due    Shingles Vaccine (2 of 3) 12/18/2015    Diabetic retinal exam  03/11/2020    Potassium monitoring  03/26/2020    Creatinine monitoring  03/26/2020    Diabetic foot exam  06/24/2020    Diabetic microalbuminuria test  07/01/2020     RTC in 3 months     I have reviewed my findings and recommendations with Sammy Rocha and Dr Anuaj Cabrera MD PGY-2  7/28/2020 10:16 AM

## 2020-07-28 NOTE — PROGRESS NOTES
Attending Physician Statement  I have discussed the case, including pertinent history and exam findings with the resident. I agree with the assessment, plan and orders as documented by the resident. Pt presented for the follow up of DM with recent A1C >8, also BP has been controlled relatively well, TSH 5.3 last time, essential tremor reported but pt declined medication. Anemia with borderline low ferritin level, need to repeat CBC for follow up.   Jose Leahy

## 2020-08-04 ENCOUNTER — TELEPHONE (OUTPATIENT)
Dept: INTERNAL MEDICINE | Age: 65
End: 2020-08-04

## 2020-08-04 NOTE — TELEPHONE ENCOUNTER
Received call from MARTY Nogueira of [facility] that pt's remainder of basaglar insulin will be shipped

## 2020-08-13 ENCOUNTER — NURSE ONLY (OUTPATIENT)
Dept: NON INVASIVE DIAGNOSTICS | Age: 65
End: 2020-08-13
Payer: MEDICARE

## 2020-08-13 PROCEDURE — 93294 REM INTERROG EVL PM/LDLS PM: CPT | Performed by: INTERNAL MEDICINE

## 2020-08-13 PROCEDURE — 93296 REM INTERROG EVL PM/IDS: CPT | Performed by: INTERNAL MEDICINE

## 2020-08-17 NOTE — PROGRESS NOTES
See Pace[employer] Traver report. Remote monitoring reviewed over a 90 day period.   End of 90 day monitoring period date of service 8-    Make/Model:  Medtronic dual chamber MRI PPM   Mode:  MVPR 60/130  Presenting rhythm: SR, ApVs, HR ~82 bpm  Battery Voltage/Longevity:  8 years    Pacing: A: 98.8%  RV: 1.4%    P wave: 1.1 mV  Impedance: 437 ohms   Threshold: NA  RV R wave: 2.3 mV  Impedance: 361 ohms   Threshold: 1.0 V @ 0.4 ms  Episodes: h/o persistent AF  -NSVT (noted on previous interrogations)    Comment:  -10/7/19 TTE: LVEF 60-65%, mildly dilated LA/RA, mild-mod TR, mod PHTN    Medications:  -Eliquis 5 mg BID  -Synthroid 112 mcg QD  -HCTZ 25 mg QOD  -ASA 81 mg QD  -Lisinopril 40 mg QD    Plan:  -91 day remote transmission  -OV recall 9/4/2020 with Nathaneil Baumgarten, NP  -forwarded to Dr Chase Delvalle for review        Meagan Forrester, APRN-CNP, [address] [facility]

## 2020-08-19 ENCOUNTER — TELEPHONE (OUTPATIENT)
Dept: NON INVASIVE DIAGNOSTICS | Age: 65
End: 2020-08-19

## 2020-08-19 RX ORDER — METOPROLOL SUCCINATE 25 MG/1
25 TABLET, EXTENDED RELEASE ORAL DAILY
COMMUNITY
End: 2020-08-19 | Stop reason: SDUPTHER

## 2020-08-19 RX ORDER — METOPROLOL SUCCINATE 25 MG/1
25 TABLET, EXTENDED RELEASE ORAL DAILY
Qty: 30 TABLET | Refills: 11 | Status: SHIPPED
Start: 2020-08-19 | End: 2021-08-03

## 2020-08-19 NOTE — TELEPHONE ENCOUNTER
----- Message from Sixto Verma MD sent at 8/18/2020 11:20 PM EDT -----  Please check echocardiogram and start Toprol XL 25 mg daily. Thanks.  ----- Message -----  From: Brock Logan RN  Sent: 8/18/2020   1:39 PM EDT  To: Sixto Verma MD    FYI: multiple NSVT: longest 17 beats.  Had echo in recent past Thanks

## 2020-08-19 NOTE — TELEPHONE ENCOUNTER
Called and spoke with patient regarding Dr. Sami Prince recommendation. Patient is agreeable to start Toprol XL 25mg daily. Patient is also agreeable to have an echo done.      Rylee Corrigan

## 2020-09-14 ENCOUNTER — TELEPHONE (OUTPATIENT)
Dept: FAMILY MEDICINE CLINIC | Age: 65
End: 2020-09-14

## 2020-09-14 NOTE — TELEPHONE ENCOUNTER
LSW covering for IM SW received VM message from patient. Requested IM SW order more insulin pens; has one box left. LSW returned call and left message to call back. LSW made another call to home; spouse answered phone. Spouse reported last time patient only got one box when usually gets 3 boxes. Has one box of 3 pens left.

## 2020-09-15 ENCOUNTER — TELEPHONE (OUTPATIENT)
Dept: INTERNAL MEDICINE | Age: 65
End: 2020-09-15

## 2020-09-18 ENCOUNTER — VIRTUAL VISIT (OUTPATIENT)
Dept: PULMONOLOGY | Age: 65
End: 2020-09-18
Payer: MEDICARE

## 2020-09-18 PROCEDURE — 99442 PR PHYS/QHP TELEPHONE EVALUATION 11-20 MIN: CPT | Performed by: INTERNAL MEDICINE

## 2020-09-18 NOTE — PROGRESS NOTES
[place]  Department of Pulmonary, Critical Care and Sleep Medicine  [address] [facility]  Department of Internal Medicine  Office Note    Dear Delphine Sanchez MD    We had the pleasure of seeing Karyle Risen, in the [address] at [facility] regarding his lung nodules, morbid obesity with restrictive lung (TLC 74%) disease and sleep apnea (not tolerated). HISTORY OF PRESENT ILLNESS:    Karyle Risen is a 77 y.o. male with a past medical history of HTN, HLP, DM, hypothyroid, OA, carpal tunnel S/p surgery, restrictive lung problems and NIKO intolerant of CPAP therapy. He also has a history of lung nodules that are stable since 2012 with continued surveillance. We reviewed his CT of the chest which demonstrates stable lung nodules since 2012 and we will continue surveillance yearly. We had a long discussion regarding untreated NIKO and not tolerating his CPAP. In 2017, he underwent back surgery which led to prolonged respiratory failure most likely due to upper airway obstruction, volume overload and pain medication. The surgery occurred at [facility] and his wife requested him to be transferred to [facility] where he was able to be extubated and went on to rehabilitation. He is home now but unfortunately the back surgery did not resolve his problems. He has lost 20 pounds from this event and he continues only to use the oxygen only at night. In 2019, he had a pacer placed by EP for sinus rodolfo dysfunction. Mr. Herrera Braswell underwent a phone visit today for follow up. He has been doing well with minimal shortness of breath with activity. He has no coughing or wheezing. He continues to refuse CPAP therapy but is wearing O2 at 2.5 L nasal/canula at night. He states he is complaint most nights. No chest pain, palpitations or lower extremity edema. No lightheadedness or syncope.   He is riding his spider motorcycle. He is using Dulera 200 BID as his insurance will not cover Breo and his lung function was 52 %. We discussed his oxygen concentrators for camping trips. We discussed his weight and BP issues. He is getting his motorcycles fixed and got a new truck     ALLERGIES:  No Known Allergies    PAST MEDICAL HISTORY:       Diagnosis Date    Atrial fibrillation ([address]     Carpal tunnel syndrome     Hyperlipidemia     Hypertension     Hypothyroidism     Lung nodule     Nocturnal hypoxemia due to obesity 8/8/2013    Obesity     NIKO (obstructive sleep apnea) 8/8/2013    [facility]    Osteoarthritis     Pinched nerve     Lumbar    Restrictive lung disease secondary to obesity 7/25/2016    Sinus node dysfunction (HCC)     Type II or unspecified type diabetes mellitus without mention of complication, not stated as uncontrolled         MEDICATIONS:   Current Outpatient Medications   Medication Sig Dispense Refill    metoprolol succinate (TOPROL XL) 25 MG extended release tablet Take 1 tablet by mouth daily 30 tablet 11    lisinopril (PRINIVIL;ZESTRIL) 40 MG tablet One tablet daily 30 tablet 2    insulin glargine (BASAGLAR KWIKPEN) 100 UNIT/ML injection pen Inject 35 Units into the skin nightly .  PAP medication 6 pen 5    apixaban (ELIQUIS) 5 MG TABS tablet TAKE ONE TABLET BY MOUTH TWO TIMES A  tablet 0    atorvastatin (LIPITOR) 40 MG tablet Take 1 tablet by mouth daily 90 tablet 2    metFORMIN (GLUCOPHAGE) 1000 MG tablet TAKE ONE TABLET BY MOUTH TWICE A DAY WITH MEALS 180 tablet 2    levothyroxine (SYNTHROID) 112 MCG tablet TAKE ONE TABLET BY MOUTH EVERY DAY 90 tablet 2    hydroCHLOROthiazide (HYDRODIURIL) 25 MG tablet TAKE ONE TABLET BY MOUTH EVERY DAY 90 tablet 2    Insulin Pen Needle (PEN NEEDLES) 32G X 6 MM MISC 2 times a day, in the morning and before bed 100 each 3    OXYGEN Inhale 2.5 L into the lungs nightly 1 Device 99    blood glucose test strips (ONETOUCH VERIO) strip TEST IN THE MORNING AND IN THE EVENING 100 each 2    ferrous sulfate (IRON 325) 325 (65 Fe) MG tablet Take 0.5 tablets by mouth daily 30 tablet 0    Dulaglutide 0.75 MG/0.5ML SOPN Inject 0.75 mg into the skin once a week      glucagon 1 MG injection  to inject 1 mg into muscle if you become unconscious for any reason other than suspected hyperglycemia 1 kit 3    glucose 4 g chewable tablet Take 4 tablets by mouth as needed for Low blood sugar 60 tablet 3    insulin 70-30 (NOVOLIN 70/30) (70-30) 100 UNIT per ML injection vial Inject 25 Units into the skin 2 times daily (Patient not taking: Reported on 7/28/2020) 6 vial 2    Lancets MISC Give Delica lancets. Tests twice a day A.M. And P.M 200 each 0    Cholecalciferol (VITAMIN D3) 1000 units TABS TAKE TWO TABLETS BY MOUTH EVERY DAY 30 tablet 0    zoster recombinant adjuvanted vaccine (SHINGRIX) 50 MCG/0.5ML SUSR injection 1 dose now and repeat in 2-6 months 0.5 mL 0    aspirin (ASPIR-LOW) 81 MG EC tablet TAKE ONE TABLET BY MOUTH EVERY DAY 30 tablet 2    ibuprofen (ADVIL;MOTRIN) 600 MG tablet Take 1 tablet by mouth every 8 hours as needed for Pain 60 tablet 1    mometasone-formoterol (DULERA) 200-5 MCG/ACT inhaler Inhale 2 puffs into the lungs 2 times daily Rinse mouth after using. PAP medication. 3 Inhaler 3    Nebulizers (NEBULIZER COMPRESSOR) MISC Please provide patient with nebulizer and tubing needed to use equipment. 1 each 0    acetaminophen (TYLENOL) 500 MG tablet Take 1,000 mg by mouth daily as needed for Pain. No current facility-administered medications for this visit. SOCIAL AND OCCUPATIONAL HEALTH:  Quit smoking about 5 years ago. Smoker 1 - 3 ppd for 40 years. There is no history of TB or TB exposure. There is no asbestos or silica dust exposure. The patient reports no coal, foundry, quarry or [employer] exposure. There is no recent travel history noted.    The patient denies a history of recreational or IV drug use. No hot tub exposure. The patient has no pets. Hobbies include riding his motorcycle. The patient denies excessive alcohol intake. The patient reports no birds or exotic animals. He is , has 4 stepchildren. He is currently on disability but was a  for 30 years. He rides a 'spider\" motorcycle. 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 Marie Coad nerve in back    BACK SURGERY  2017    COLONOSCOPY  2007    multiple colon polyps    COLONOSCOPY  12    COLONOSCOPY    EYE SURGERY      lennie cataracts implant    HERNIA REPAIR      umbilical    PACEMAKER PLACEMENT  10/03/2017    Medtronic dual chamber    Dr. Jakob Rodriguez Right        FAMILY HISTORY: A review of medical events in the patient's family , including disease which may be hereditary or place the patient at risk were reviewed.    Family History   Problem Relation Age of Onset    Cancer Mother     Heart Disease Mother     High Blood Pressure Father     Cancer Brother     High Blood Pressure Brother     Diabetes Brother       Family Status   Relation Name Status    Mother      Father      Sister malka Alive    Brother lisa Alive    Sister sarah Alive    Sister pat Alive    Sister [place] Alive    Brother lucrecia     Brother mars Alive    Brother renay Alive        REVIEW OF SYSTEMS: The patients health assessment form was reviewed. Constitutional: General health fair . The patient complains of weight changes. The patient denies any recent fevers or weakness. Head: Patient denies any history of trauma, convulsive disorder or syncope. Skin:  Patient denies history of changes in pigmentation, eruptions or pruritus. Eyes: Patient denies any history of color blindness, photophobia, diplopia, inflammation, cataracts or glaucoma. Ears: Patient denies history of deafness, tinnitus, pain, discharge or recurrent infections. Nose/Throat: Patient admits to drainage. Patient denies history of soreness of mouth or tongue. Patient denies history of hoarseness, voice changes, sore throats or recurrent tonsillitis. Lymphatic:  Patient denies history of enlargement, inflammation, pain or suppuration. He admits to history of lymphoma. Cardiovascular:  Patient admits to history of palpations, & chest pain. He admits to orthopnea & cold extremities. He admits to edema. Biventricular pacemaker for sinus node dysfunction.    Pulmonary:  Patient denies wheezing, dyspnea, exertional dyspnea, nocturnal dyspnea. He denies cough. He denies hemoptysis or pleurisy. He complains of sleep disorder. He reports symptoms of sleep apnea. Has nocturnal hypoxemia with O2 at 2.5 L N/C. Gastrointestinal: Patient denies changes in appetite. He denies dysphagia, odynophagia or abdominal pain. He denies hematochezia, melena, bowel habit changes or hemorrhoids. Allergy/Immunology: The patient denies itching, hives, or angioedema. The patient denies a problem with seasonal/environmental allergies. Genitourinary:  The patient denies a history of stones or UTI. Vascular: No history of peripheral vascular disease, carotid occlusive disease or aneurysms. Musculoskeletal: The patient reports a history of arthritis & joint pains. He complains of loss of strength.  He severe bilateral upper shoulder discomfort and previous laminectomy with ongoing persistent hip pain and inability to exercise. OA noted. Complains of right thumb discomfort. Breasts: He denies history of masses, lumps, pain, or nipple changes. The patient denies a history of breast cancer. Neurological: Patient denies vertigo. He denies twitching, convulsions, loss of consciousness. No memory problems. Psychological: Patient denies moodiness, depression or anxiety. He denies obsessions, delusions, illusions or hallucinations. Cancer: No history of cancer reported. Endocrine: No history of goiter. No history of thyroid disorders. He reports diabetes. He is taking 70/30 insulin 20 units at bedtime, reports BG of 100-110's in the morning without overnight hypoglycemic symptoms. He is Rx 12 units qAM and 8 units qHS. His last A1c was 6.3 from 10/3/17. Hematopoietic:  He denies history of bleeding disorders or easy bruising. He denies hypercoagulable disorder. Integumentory: No skin lesion, rashes, venous stasis or varicose veins. They denies any report of skin cancer. Rheumatic:  The patient denies polyarthritis or inflammatory joint disease. PHYSICAL EXAMINATION:   There were no vitals filed for this visit. Constitutional: A morbid obese  77 y.o. male who is alert, oriented, cooperative and in no apparent distress. Head was normocephalic and atraumatic. EENT: EOMI ESTRELLA. MMM. No icterus. No conjunctival injections. Septum was midline, mucosa was without erythema, exudates or cobblestoning. No thrush. Neck: Supple without thyromegaly. No elevated JVP. Trachea was midline. No carotid bruits. Respiratory: Symmetrical without dullness to percussion. Faint crackles on right. No wheezes, rhonchi. No intercostal retraction or use of accessory muscles. No egophony. Cardiovascular: Nonpalpable PMI. Regular without murmur, clicks, gallops or rubs. No left or right ventricular heave.   Pacer in situ  Pulses:  Carotid, radial and femoral pulses were equal bilaterally. Abdomen: Obese, rounded and soft without organomegaly. No rebound, rigidity. Lymphatic: No lymphadenopathy. Musculoskeletal: Ambulates without assistance. Flattened lordotic curvature of the spine. No involuntary movements. Extremities: Tremor noted bilateral hands. Trace lower extremity edema. No ulcerations, tenderness. Noted varicosities & venous statis/ erythema. Sensory function sensation is very diminished. Skin:  Warm and dry. Poor skin color, turgor. No bruises or skin rashes. Old surgical scars are noted. Back surgery scar. Neurological/Psychiatric: General behavior is normal. Memory is slightly impair on long term recall. Emotional status is normal.  Cranial nerves II-XII are intact. DATA:   The data collected below information that was obtained, reviewed, analyzed and interpreted today. Spirometry was compared to previous test if available and demonstrates an FVC of 2.21 liters which is 57 % of predicted with an FEV1 of 1.93 liters which is 65 % of predicted. FEV1/FVC ratio is 86 %. Mid expiratory flow rates are 95 % of predicted. Maximum voluntary ventilation is 75 liters per minute or 58 % of predicted. Flow volume loop shows no signs of intrathoracic or extrathoracic process. Impression: Moderate Restrictive Pathology    Static lung volumes (2015)  demonstrate an ERV of 0.16 liters which is 15% predicted, TGV of 2.34 liters which is 71% predicted, RV which is 2.18 liters which is 103% predicted, TLC of 4.72 liters which is 74% predicted. RV/TLC ratio is 140% predicted. DLCO is 95% predicted with a DL/VA of 94% predicted when corrected for alveolar ventilation. ECHOCARDIOGRAM: (2017): Left ventricular internal dimensions were normal in diastole and systole.  Borderline concentric left ventricular hypertrophy. Normal left ventricular ejection fraction. Ejection fraction is visually estimated at 70%.   Borderline concentric left ventricular hypertrophy.  Normal left ventricular ejection fraction. The left atrium is mildly dilated. Mildly dilated right ventricle. CT scan Right ventricle global systolic function is low normal.  The aortic valve appears moderately sclerotic.     CT SCAN CHEST (2/2019):  LUNGS: The tiny lung nodules which were previously present are stable. No pleural effusion or pneumothorax is seen. HEART: Unremarkable. AORTA: The aorta appears to be unremarkable. MEDIASTINUM: The mediastinum nonspecific adenopathy noted most pronounced in the sub-subcarinal space. UPPER ABDOMEN: Unremarkable. OTHER:Unremarkable    CT SCAN CHEST (12/2017): Airways appear patent. A few chronic bands of atelectasis or fibrosis in each lower lobe. Solid noncalcified subpleural nodule posterior lateral aspect right upper lobe at the level the yecenia measures about 3.8 mm whereas previously it measured about 3.1 mm. Other smaller peripheral nodular densities in the right upper lobe are stable. Peripheral solid nodule laterally left upper lobe at the level the yecenia measuring about 3.7 mm is unchanged. CT SCAN CHEST (7/2016): We reviewed the films during the inter-disciplinary rounds/conference, which showed mild cardiomegaly with mediastinal lipomatosis and nonspecific lymph nodes in the mediastinum. 2. 4 mm noncalcified nodules in the upper lobe bilaterally which are unchanged since 2012. CT SCAN CHEST (7/2015): IMPRESSION: 1. Indeterminate pulmonary nodules are stable back to August 22,2012. No evidence for new pulmonary nodule, mass, or lymphadenopathy. 2. No acute pleural-parenchymal disease. 3. Borderline heart size. SLEEP STUDY: (2013)  IMPRESSION: 1. Known sleep apnea. 2. Nocturnal hypoxemia. 3. 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 4. No cardiac dysrhythmia.         IMPRESSION:    Greta Nieto is a 77 y.o. male who is morbidly obese with restrictive lung disease secondary to his obesity along with incidental pulmonary nodules in a previous smoker and lastly, obstructive sleep apnea, intolerant to CPAP therapy on oxygen at 2.5 liters. With this in mind, we would like to proceed with the following;                  PLAN:    He is feeling ok. We discussed his weight, BP & exercise. He is riding his sp[employer]ren[employer] motorcycle. As for his restrictive lung disease, his spirometry is stable and his breathing is at baseline. He is intolerant of the CPAP mask and is wearing his O2 at night and he is on 2.5 liter nasal canula. ([employer]) We had a long discussion regarding untreated NIKO and nocturnal hypoxemia the risks. He will follow up in 6 months and on an as needed basis. We will continue with the ICS/LABA for some chronic bronchitis symptoms. Because of his frequent travel which includes: camping, overnight trips out of state to visit grandchildren - we discussed a portable oxygen concentrator to help maintain compliance for his chronic respiratory failure - [employer] info was given. It was too expensive. We hope this updates you on our evaluation and clinical thinking. Thank you for intrusting us to participate in [address]. If you have any questions, please don't hesitate to call us at the [facility]. Sincerely,    Latia Felix DO, MPH, Esequiel Lange, FACP  Director, [facility]  Professor of Internal Medicine  [address] of [facility]  [address] [place] [address]

## 2020-09-29 ENCOUNTER — TELEPHONE (OUTPATIENT)
Dept: INTERNAL MEDICINE | Age: 65
End: 2020-09-29

## 2020-09-29 NOTE — TELEPHONE ENCOUNTER
Pt called LSW to check if PAP basaglar is in as he has less than 1 pen.   1 box of basaglar has arrived; discussed with MARTY Howard of [employer] that quantity again is not correct despite her contacting company 8-4-20; she has already reported again to Joanna another shipment to arrive; pt picking up the 1 box today and aware of above

## 2020-10-08 ENCOUNTER — TELEPHONE (OUTPATIENT)
Dept: CARDIOLOGY | Age: 65
End: 2020-10-08

## 2020-10-08 NOTE — TELEPHONE ENCOUNTER
CALLED PATIENT AND LEFT MESSAGE TO SCHEDULE AN ECHO  Electronically signed by Lotus Gonzalez on 10/8/2020 at 1:41 PM

## 2020-10-14 ENCOUNTER — HOSPITAL ENCOUNTER (OUTPATIENT)
Age: 65
Discharge: HOME | End: 2020-10-14
Payer: MEDICARE

## 2020-10-14 LAB
HCT VFR BLD CALC: 36.6 % (ref 37–54)
HEMOGLOBIN: 11.7 G/DL (ref 12.5–16.5)
MCH RBC QN AUTO: 29.8 PG (ref 26–35)
MCHC RBC AUTO-ENTMCNC: 32 % (ref 32–34.5)
MCV RBC AUTO: 93.1 FL (ref 80–99.9)
PDW BLD-RTO: 14.4 FL (ref 11.5–15)
PLATELET # BLD: 207 E9/L (ref 130–450)
PMV BLD AUTO: 9.3 FL (ref 7–12)
RBC # BLD: 3.93 E12/L (ref 3.8–5.8)
WBC # BLD: 6.4 E9/L (ref 4.5–11.5)

## 2020-10-14 PROCEDURE — 36415 COLL VENOUS BLD VENIPUNCTURE: CPT

## 2020-10-14 PROCEDURE — 85027 COMPLETE CBC AUTOMATED: CPT

## 2020-10-19 ENCOUNTER — TELEPHONE (OUTPATIENT)
Dept: CARDIOLOGY | Age: 65
End: 2020-10-19

## 2020-10-19 NOTE — TELEPHONE ENCOUNTER
Confirmed echo appointment with wife, Feliciano Church, and reviewed the COVID pre procedure checklist.

## 2020-10-20 ENCOUNTER — HOSPITAL ENCOUNTER (OUTPATIENT)
Dept: CARDIOLOGY | Age: 65
Discharge: HOME | End: 2020-10-20
Payer: MEDICARE

## 2020-10-20 LAB
LV EF: 63 %
LVEF MODALITY: NORMAL

## 2020-10-20 PROCEDURE — 93306 TTE W/DOPPLER COMPLETE: CPT

## 2020-10-21 ENCOUNTER — TELEPHONE (OUTPATIENT)
Dept: NON INVASIVE DIAGNOSTICS | Age: 65
End: 2020-10-21

## 2020-10-21 NOTE — TELEPHONE ENCOUNTER
----- Message from Kimberlee Douglas MD sent at 10/20/2020  4:22 PM EDT -----  Please let him know that echo showed normal LV EF. Will continue to observe while on Toprol XL. Will consider stress test if NSVT recurs.  Thanks.  ----- Message -----  From: Stephanie Haddad Incoming Cardiology Results From [facility]  Sent: 10/20/2020   4:05 PM EDT  To: Kimberlee Douglas MD

## 2020-10-27 NOTE — TELEPHONE ENCOUNTER
Last Appointment:  7/28/2020  Future Appointments   Date Time Provider Stacie Mtz   11/10/2020  8:30 AM Aba Dillon MD [facility]   11/13/2020 10:10 AM SCHEDULE, REMOTE CHECK MARKUS ELECTRO PHYS [facility]

## 2020-11-06 RX ORDER — LISINOPRIL 40 MG/1
TABLET ORAL
Qty: 90 TABLET | Refills: 1 | Status: SHIPPED
Start: 2020-11-06 | End: 2021-05-07

## 2020-11-06 NOTE — TELEPHONE ENCOUNTER
Last Appointment:  7/28/2020  Future Appointments   Date Time Provider Stacie Bibi   11/10/2020  8:30 AM Nelsy Johnson MD [facility]   11/13/2020 10:10 AM SCHEDULE, REMOTE CHECK MARKUS ELECTRO PHYS [facility]

## 2020-11-10 ENCOUNTER — OFFICE VISIT (OUTPATIENT)
Dept: INTERNAL MEDICINE | Age: 65
End: 2020-11-10
Payer: MEDICARE

## 2020-11-10 VITALS
HEART RATE: 72 BPM | BODY MASS INDEX: 38.61 KG/M2 | WEIGHT: 246 LBS | TEMPERATURE: 97.3 F | DIASTOLIC BLOOD PRESSURE: 67 MMHG | RESPIRATION RATE: 16 BRPM | SYSTOLIC BLOOD PRESSURE: 143 MMHG | HEIGHT: 67 IN

## 2020-11-10 LAB — HBA1C MFR BLD: 8 %

## 2020-11-10 PROCEDURE — 3052F HG A1C>EQUAL 8.0%<EQUAL 9.0%: CPT | Performed by: INTERNAL MEDICINE

## 2020-11-10 PROCEDURE — 99213 OFFICE O/P EST LOW 20 MIN: CPT | Performed by: INTERNAL MEDICINE

## 2020-11-10 PROCEDURE — 83036 HEMOGLOBIN GLYCOSYLATED A1C: CPT | Performed by: INTERNAL MEDICINE

## 2020-11-10 RX ORDER — LANCETS 30 GAUGE
EACH MISCELLANEOUS
Qty: 200 EACH | Refills: 5 | Status: SHIPPED | OUTPATIENT
Start: 2020-11-10 | End: 2021-09-27 | Stop reason: SDUPTHER

## 2020-11-10 RX ORDER — BLOOD SUGAR DIAGNOSTIC
STRIP MISCELLANEOUS
Qty: 100 EACH | Refills: 5 | Status: SHIPPED
Start: 2020-11-10 | End: 2021-02-23 | Stop reason: SDUPTHER

## 2020-11-10 RX ORDER — INSULIN GLARGINE 100 [IU]/ML
35 INJECTION, SOLUTION SUBCUTANEOUS EVERY MORNING
Qty: 14 PEN | Refills: 3 | Status: SHIPPED | OUTPATIENT
Start: 2020-11-10 | End: 2021-06-14 | Stop reason: SDUPTHER

## 2020-11-10 RX ORDER — INSULIN GLARGINE 100 [IU]/ML
35 INJECTION, SOLUTION SUBCUTANEOUS EVERY MORNING
Qty: 6 PEN | Refills: 5 | Status: CANCELLED | OUTPATIENT
Start: 2020-11-10

## 2020-11-10 NOTE — PROGRESS NOTES
[facility]  Internal Medicine Residency Program  [facility] Note      SUBJECTIVE:  CC: had concerns including 3 Month Follow-Up; Diabetes Mellitus; Hypertension; and Medication Refill. HPI:Wander Rocha presented to the [facility] for a routine visit    Hx of HTN, IDDM2, hypothyroidism, COPD on O2 2.5 L nightly, NIKO  intolerant of CPAP, just on NC,  follow with Dr Wallis Hashimoto, morbid obesity    1. Essential hypertension  Controlled   - lisinopril 40 mg QD and HCTZ 25 mg QD       2. Type 2 diabetes mellitus with hyperglycemia, with long-term current use of insulin ([address]  He was seen by Dr Key Israel early this yr -> on 70/30, lost follow up?  160s, 140s-200s 219, 137  Will increase morning basaglar to 35 U  Dinner before 6  A1c today 8.0  - POCT glycosylated hemoglobin (Hb A1C) 8.2 (7/28)  - atorvastatin (LIPITOR) 40 MG tablet; Take 1 tablet by mouth daily  Dispense: 90 tablet; Refill: 2  - metFORMIN (GLUCOPHAGE) 1000 MG BID  Eye check ->   Podiatry -> Dr Melchor Hodgson pt will call      3. Acquired hypothyroidism  Last TSH in Feb borderline high  Any hypothyroidism sxs? -> constipation, fatigue  - levothyroxine (SYNTHROID) 112 MCG tablet; TAKE ONE TABLET BY MOUTH EVERY DAY  Dispense: 90 tablet; Refill: 2     4. Other iron deficiency anemia  - CBC; Future  -> 11.7 colonoscopy in 2012 normal -> for 10 yrs  Iron study in 2019 low iron sat? -> repeat?     5. At high risk for falls  Educated about home safety   On the basis of positive falls risk screening, assessment and plan is as follows: home safety tips provided.     6. Tremor   Essential vs physiological  Trigger? postural or rest or action  ET -> postural(when arm outstretched), familyhx, goal directed movement(FTN), may try propanolol or primidone, alcohol improve ET  Physiological -> worse with sympathetic activity (drugs, hyperthyroid, caffeine, anxiety)  3 cups of coffee     Review Of Systems:  General: no fevers, chills, weight loss or gain.    Ears/Nose/Throat: chewable tablet Take 4 tablets by mouth as needed for Low blood sugar 60 tablet 3    Lancets MISC Give Delica lancets. Tests twice a day A.M. And P.M 200 each 0    Cholecalciferol (VITAMIN D3) 1000 units TABS TAKE TWO TABLETS BY MOUTH EVERY DAY 30 tablet 0    zoster recombinant adjuvanted vaccine (SHINGRIX) 50 MCG/0.5ML SUSR injection 1 dose now and repeat in 2-6 months 0.5 mL 0    aspirin (ASPIR-LOW) 81 MG EC tablet TAKE ONE TABLET BY MOUTH EVERY DAY 30 tablet 2    ibuprofen (ADVIL;MOTRIN) 600 MG tablet Take 1 tablet by mouth every 8 hours as needed for Pain 60 tablet 1    mometasone-formoterol (DULERA) 200-5 MCG/ACT inhaler Inhale 2 puffs into the lungs 2 times daily Rinse mouth after using. PAP medication. 3 Inhaler 3    Nebulizers (NEBULIZER COMPRESSOR) MISC Please provide patient with nebulizer and tubing needed to use equipment. 1 each 0    acetaminophen (TYLENOL) 500 MG tablet Take 1,000 mg by mouth daily as needed for Pain. I have reviewed all pertinent PMHx, PSHx, FamHx, SocialHx, medications, and allergies and updated history as appropriate. OBJECTIVE:    VS: BP (!) 143/67   Pulse 72   Temp 97.3 °F (36.3 °C) (Oral)   Resp 16   Ht 5' 7\" (1.702 m)   Wt 246 lb (111.6 kg)   BMI 38.53 kg/m²      General appearance: Alert, Awake, Oriented times 3, no distress  Lungs: Lungs clear to auscultation bilaterally. No rhonchi, crackles or wheezes  Heart: S1 S2  Regular rate and rhythm. No rub, murmur or gallop  Abdomen: Abdomen soft but obese, non-tender. non-distended BS normal. No masses, organomegaly, no guarding rebound or rigidity.   Extremities: No edema, Peripheral pulses palpable 2/4    ASSESSMENT/PLAN:  Type 2 dm-inc AIc noted  Eye and foot exam fu    HTN  Tremor   see attending note  HCM  -One time AAA screening ultrasound for smokers >5 pack's lifetime neg  -ETOH misuse No  -Colon cancer screening done, hyperplastic polyp 2012 due 2022  -recommended exercise for falls prevention Yes  -one-time screening for HCV infection to adults born between 1945 and 1965 - negative 2017     I have reviewed my findings and recommendations with Bebe Hernandes and attending    Justin Cristina MD PGY-1   11/9/2020 9:16 PM

## 2020-11-10 NOTE — PROGRESS NOTES
Pen[facility] Lovell [facility]  Internal Medicine Residency Program  [facility] Note      SUBJECTIVE:  CC: had concerns including 3 Month Follow-Up; Diabetes Mellitus; Hypertension; and Medication Refill. HPI:Wander Rocha presented to the [facility] for a routine visit    Hx of HTN, IDDM2, hypothyroidism, COPD on O2 2.5 L nightly, NIKO  intolerant of CPAP, just on NC,  follow with Dr Glenn Coloraod, morbid obesity    No complaints today, , he states not taking morning BP meds, not check BP at home.      Review Of Systems:  General: no fevers, chills, weight loss or gain. Ears/Nose/Throat: no hearing loss, tinnitus, vertigo, nosebleed, nasal congestion, rhinorrhea, sore throat  Respiratory: no cough, pleuritic chest pain, dyspnea, or wheezing  Cardiovascular: no chest pain, angina, dyspnea on exertion, orthopnea, PND, palpitations, or claudication  Gastrointestinal: no nausea, vomiting, heartburn, diarrhea, constipation, abdominal pain, hematochezia or melena  Genitourinary: no urinary urgency, frequency, dysuria, nocturia, hesitancy, or incontinence  Musculoskeletal: no arthritis, arthralgia, myalgia, weakness, or morning stiffness  Skin: no abnormal pigmentation, rash, itching, masses, hair or nail changes    Outpatient Medications Marked as Taking for the 11/10/20 encounter (Office Visit) with Meghna Moon MD   Medication Sig Dispense Refill    apixaban (ELIQUIS) 5 MG TABS tablet TAKE ONE TABLET BY MOUTH TWO TIMES A  tablet 5    blood glucose test strips (ONETOUCH VERIO) strip TEST IN THE MORNING AND IN THE EVENING 100 each 5    Lancets MISC Give Delica lancets. Tests twice a day A.M. And P.M 200 each 5    lisinopril (PRINIVIL;ZESTRIL) 40 MG tablet TAKE ONE TABLET BY MOUTH DAILY 90 tablet 1    metoprolol succinate (TOPROL XL) 25 MG extended release tablet Take 1 tablet by mouth daily 30 tablet 11    insulin glargine (BASAGLAR KWIKPEN) 100 UNIT/ML injection pen Inject 35 Units into the skin nightly .  PAP medication 6 pen 5    atorvastatin (LIPITOR) 40 MG tablet Take 1 tablet by mouth daily 90 tablet 2    metFORMIN (GLUCOPHAGE) 1000 MG tablet TAKE ONE TABLET BY MOUTH TWICE A DAY WITH MEALS 180 tablet 2    levothyroxine (SYNTHROID) 112 MCG tablet TAKE ONE TABLET BY MOUTH EVERY DAY 90 tablet 2    hydroCHLOROthiazide (HYDRODIURIL) 25 MG tablet TAKE ONE TABLET BY MOUTH EVERY DAY 90 tablet 2    Insulin Pen Needle (PEN NEEDLES) 32G X 6 MM MISC 2 times a day, in the morning and before bed 100 each 3    OXYGEN Inhale 2.5 L into the lungs nightly 1 Device 99    ferrous sulfate (IRON 325) 325 (65 Fe) MG tablet Take 0.5 tablets by mouth daily 30 tablet 0    glucose 4 g chewable tablet Take 4 tablets by mouth as needed for Low blood sugar 60 tablet 3    Cholecalciferol (VITAMIN D3) 1000 units TABS TAKE TWO TABLETS BY MOUTH EVERY DAY 30 tablet 0    zoster recombinant adjuvanted vaccine (SHINGRIX) 50 MCG/0.5ML SUSR injection 1 dose now and repeat in 2-6 months 0.5 mL 0    aspirin (ASPIR-LOW) 81 MG EC tablet TAKE ONE TABLET BY MOUTH EVERY DAY 30 tablet 2    ibuprofen (ADVIL;MOTRIN) 600 MG tablet Take 1 tablet by mouth every 8 hours as needed for Pain 60 tablet 1    mometasone-formoterol (DULERA) 200-5 MCG/ACT inhaler Inhale 2 puffs into the lungs 2 times daily Rinse mouth after using. PAP medication. 3 Inhaler 3    Nebulizers (NEBULIZER COMPRESSOR) MISC Please provide patient with nebulizer and tubing needed to use equipment. 1 each 0    acetaminophen (TYLENOL) 500 MG tablet Take 1,000 mg by mouth daily as needed for Pain. I have reviewed all pertinent PMHx, PSHx, FamHx, SocialHx, medications, and allergies and updated history as appropriate. OBJECTIVE:    VS: BP (!) 143/67   Pulse 72   Temp 97.3 °F (36.3 °C) (Oral)   Resp 16   Ht 5' 7\" (1.702 m)   Wt 246 lb (111.6 kg)   BMI 38.53 kg/m²   General appearance: Alert, Awake, Oriented times 3, no distress  Lungs: Lungs clear to auscultation bilaterally. No rhonchi, crackles or wheezes  Heart: S1 S2  Regular rate and rhythm. No rub, murmur or gallop  Abdomen: Abdomen soft but obese, non-tender. non-distended BS normal. No masses, organomegaly, no guarding rebound or rigidity. Extremities: No edema, Peripheral pulses palpable 2/4    ASSESSMENT/PLAN:    1. Essential hypertension  He didn't take his morning BP meds  cont lisinopril 40 mg QD and HCTZ 25 mg QD     2. Type 2 diabetes mellitus with hyperglycemia, with long-term current use of insulin ([address]  He was seen by Dr Kushal Davis early this yr -> was on 70/30, lost follow up  FBS 160s, preprandial BG around 140s-200s highest: 219, lowest 137  cont morning basaglar on 35 U since his A1c stable, no hypoglycemic episode  Dinner before 6  Repeat A1c today 8.0 improved   Hb A1C 8.2 (7/28)  Cont high intensity statin and metformin 1000 BID  Eye check -> refer to Dr Marquise Baeza followed with Dr Jigar Nuñez pt will call      3. Acquired hypothyroidism  Last TSH in Feb borderline high  Denies any hypothyroidism sxs? -> constipation, fatigue  - levothyroxine (SYNTHROID) 112 MCG tablet; TAKE ONE TABLET BY MOUTH EVERY DAY  Dispense: 90 tablet; Refill: 2  Will check labs on next visit, Free T4     4. Other iron deficiency anemia  - CBC; Future  -> 11.7 colonoscopy in 2012 normal -> for 10 yrs   Iron study in 2019 low iron sat with normal TIBC will need ret check     5. At high risk for falls  Educated about home safety   On the basis of positive falls risk screening, assessment and plan is as follows: home safety tips provided.     6. Tremor   Bilateral hands, worse with anxious and goal direct movement like reach out for door knot, likely physiological, family hx of tremor, not relieved by alcohol   3 cups of coffee, consult about cutting down caffeine intake  Will observe for now, if getting worse, consider switch toprol to propanolol    7. NIKO intolerant of mask, on 2.5 NC    8.  COPD controlled on dulera    HCM  -One time AAA

## 2020-11-10 NOTE — PROGRESS NOTES
[facility]  Internal Medicine Clinic    Attending Physician Statement:  Moo Lim M.D., F.A.C.P. I have discussed the case, including pertinent history and exam findings with the resident. I agree with the assessment, plan and orders as documented by the resident. Patient is seen for fu visit today. Last office notes reviewed, relative labs and imaging. Health maintenance issues of vaccinations, depression screening, tobacco cessation etc... covered  afib - rate controlled on toprol and pacerbackup  +eliquis  Also ?essential tremors- he is deferring switch to propanalol trial - mild symptoms    Thyroid TSH 5-6    Doubt iron def- stop iron and reassess  Dec iron sat, but tibc NOT high, check retic hgb equivalent    bp borderline elevated  BP (!) 143/67   Pulse 72   Temp 97.3 °F (36.3 °C) (Oral)   Resp 16   Ht 5' 7\" (1.702 m)   Wt 246 lb (111.6 kg)   BMI 38.53 kg/m²   Off 70/30 dm2  aic pending  aic 8.0 (he didn't fu dr. Kory Reyes, or take 70/30 Rx previously)  Will use long acting insulin per his preference  FIT test once available    Remainder of medical problems as per resident note.

## 2020-11-13 ENCOUNTER — NURSE ONLY (OUTPATIENT)
Dept: NON INVASIVE DIAGNOSTICS | Age: 65
End: 2020-11-13
Payer: MEDICARE

## 2020-11-13 PROCEDURE — 93296 REM INTERROG EVL PM/IDS: CPT | Performed by: INTERNAL MEDICINE

## 2020-11-13 PROCEDURE — 93294 REM INTERROG EVL PM/LDLS PM: CPT | Performed by: INTERNAL MEDICINE

## 2020-11-24 NOTE — PROGRESS NOTES
See [employer] report. Remote monitoring reviewed over a 90 day period. End of 90 day monitoring period date of service 11-. Spoke with patient regarding any symptoms, has no symptoms. Never started Toprol as directed back in August. Will check with his pharmacy and start now.      Lian Hernandez RN, BSN  [facility]

## 2020-12-01 ENCOUNTER — TELEPHONE (OUTPATIENT)
Dept: INTERNAL MEDICINE | Age: 65
End: 2020-12-01

## 2020-12-28 ENCOUNTER — TELEPHONE (OUTPATIENT)
Dept: INTERNAL MEDICINE | Age: 65
End: 2020-12-28

## 2020-12-29 NOTE — TELEPHONE ENCOUNTER
Dr. Stefano Godoy notified of note per . Called and spoke with pt via phone. Notified 1 bottle of Eliquis-60 tabs was available if pt able to . States he will stop by office later today. See MAR.

## 2021-01-12 ENCOUNTER — TELEPHONE (OUTPATIENT)
Dept: INTERNAL MEDICINE | Age: 66
End: 2021-01-12

## 2021-01-12 NOTE — TELEPHONE ENCOUNTER
Returned pt's call and spoke with pt and wife; reports that his insurance company has agreed to cover 3 month supply of Eliquis for $80 and is faxing paperwork to confirm his doseage; verified pt still has samples and advised to call LSW when running low if not completely resolved

## 2021-01-18 ENCOUNTER — TELEPHONE (OUTPATIENT)
Dept: INTERNAL MEDICINE | Age: 66
End: 2021-01-18

## 2021-02-12 ENCOUNTER — NURSE ONLY (OUTPATIENT)
Dept: NON INVASIVE DIAGNOSTICS | Age: 66
End: 2021-02-12
Payer: MEDICARE

## 2021-02-12 DIAGNOSIS — Z95.0 PACEMAKER: Primary | ICD-10-CM

## 2021-02-12 DIAGNOSIS — I49.5 SINUS NODE DYSFUNCTION (HCC): ICD-10-CM

## 2021-02-12 DIAGNOSIS — I48.19 PERSISTENT ATRIAL FIBRILLATION (HCC): ICD-10-CM

## 2021-02-12 PROCEDURE — 93296 REM INTERROG EVL PM/IDS: CPT | Performed by: INTERNAL MEDICINE

## 2021-02-12 PROCEDURE — 93294 REM INTERROG EVL PM/LDLS PM: CPT | Performed by: INTERNAL MEDICINE

## 2021-02-23 ENCOUNTER — CARE COORDINATION (OUTPATIENT)
Dept: INTERNAL MEDICINE | Age: 66
End: 2021-02-23

## 2021-02-23 ENCOUNTER — OFFICE VISIT (OUTPATIENT)
Dept: INTERNAL MEDICINE | Age: 66
End: 2021-02-23
Payer: MEDICARE

## 2021-02-23 VITALS
WEIGHT: 244 LBS | HEART RATE: 80 BPM | RESPIRATION RATE: 20 BRPM | BODY MASS INDEX: 38.3 KG/M2 | TEMPERATURE: 98.9 F | HEIGHT: 67 IN | SYSTOLIC BLOOD PRESSURE: 140 MMHG | DIASTOLIC BLOOD PRESSURE: 80 MMHG

## 2021-02-23 DIAGNOSIS — E11.43 TYPE 2 DIABETES MELLITUS WITH DIABETIC AUTONOMIC NEUROPATHY, WITH LONG-TERM CURRENT USE OF INSULIN (HCC): Primary | ICD-10-CM

## 2021-02-23 DIAGNOSIS — E03.9 HYPOTHYROIDISM, UNSPECIFIED TYPE: Chronic | ICD-10-CM

## 2021-02-23 DIAGNOSIS — D64.9 NORMOCYTIC ANEMIA: ICD-10-CM

## 2021-02-23 DIAGNOSIS — Z79.4 TYPE 2 DIABETES MELLITUS WITH DIABETIC AUTONOMIC NEUROPATHY, WITH LONG-TERM CURRENT USE OF INSULIN (HCC): Primary | ICD-10-CM

## 2021-02-23 DIAGNOSIS — I10 ESSENTIAL HYPERTENSION: Chronic | ICD-10-CM

## 2021-02-23 LAB — HBA1C MFR BLD: 7.1 %

## 2021-02-23 PROCEDURE — 3051F HG A1C>EQUAL 7.0%<8.0%: CPT | Performed by: INTERNAL MEDICINE

## 2021-02-23 PROCEDURE — 99212 OFFICE O/P EST SF 10 MIN: CPT | Performed by: INTERNAL MEDICINE

## 2021-02-23 PROCEDURE — 83036 HEMOGLOBIN GLYCOSYLATED A1C: CPT | Performed by: INTERNAL MEDICINE

## 2021-02-23 PROCEDURE — 99213 OFFICE O/P EST LOW 20 MIN: CPT | Performed by: INTERNAL MEDICINE

## 2021-02-23 RX ORDER — BLOOD SUGAR DIAGNOSTIC
STRIP MISCELLANEOUS
Qty: 100 EACH | Refills: 5 | Status: SHIPPED
Start: 2021-02-23 | End: 2021-09-27 | Stop reason: SDUPTHER

## 2021-02-23 ASSESSMENT — PATIENT HEALTH QUESTIONNAIRE - PHQ9
SUM OF ALL RESPONSES TO PHQ QUESTIONS 1-9: 0
2. FEELING DOWN, DEPRESSED OR HOPELESS: 0

## 2021-02-23 NOTE — PATIENT INSTRUCTIONS
· You may get dizzy if you do not drink enough water. To prevent dehydration, drink plenty of fluids, enough so that your urine is light yellow or clear like water. Choose water and other caffeine-free clear liquids. If you have kidney, heart, or liver disease and have to limit fluids, talk with your doctor before you increase the amount of fluids you drink. · Exercise regularly to improve your strength, muscle tone, and balance. Walk if you can. Swimming may be a good choice if you cannot walk easily. · Have your vision and hearing checked each year or any time you notice a change. If you have trouble seeing and hearing, you might not be able to avoid objects and could lose your balance. · Know the side effects of the medicines you take. Ask your doctor or pharmacist whether the medicines you take can affect your balance. Sleeping pills or sedatives can affect your balance. · Limit the amount of alcohol you drink. Alcohol can impair your balance and other senses. · Ask your doctor whether calluses or corns on your feet need to be removed. If you wear loose-fitting shoes because of calluses or corns, you can lose your balance and fall. · Talk to your doctor if you have numbness in your feet. Preventing falls at home  · Remove raised doorway thresholds, throw rugs, and clutter. Repair loose carpet or raised areas in the floor. · Move furniture and electrical cords to keep them out of walking paths. · Use nonskid floor wax, and wipe up spills right away, especially on ceramic tile floors. · If you use a walker or cane, put rubber tips on it. If you use crutches, clean the bottoms of them regularly with an abrasive pad, such as steel wool. · Keep your house well lit, especially Kamlesh Kroner, and outside walkways. Use night-lights in areas such as hallways and bathrooms. Add extra light switches or use remote switches (such as switches that go on or off when you clap your hands) to make it easier to turn lights on if you have to get up during the night. · Install sturdy handrails on stairways. · Move items in your cabinets so that the things you use a lot are on the lower shelves (about waist level). · Keep a cordless phone and a flashlight with new batteries by your bed. If possible, put a phone in each of the main rooms of your house, or carry a cell phone in case you fall and cannot reach a phone. Or, you can wear a device around your neck or wrist. You push a button that sends a signal for help. · Wear low-heeled shoes that fit well and give your feet good support. Use footwear with nonskid soles. Check the heels and soles of your shoes for wear. Repair or replace worn heels or soles. · Do not wear socks without shoes on wood floors. · Walk on the grass when the sidewalks are slippery. If you live in an area that gets snow and ice in the winter, sprinkle salt on slippery steps and sidewalks. Preventing falls in the bath  · Install grab bars and nonskid mats inside and outside your shower or tub and near the toilet and sinks. · Use shower chairs and bath benches. · Use a hand-held shower head that will allow you to sit while showering. · Get into a tub or shower by putting the weaker leg in first. Get out of a tub or shower with your strong side first.  · Repair loose toilet seats and consider installing a raised toilet seat to make getting on and off the toilet easier. · Keep your bathroom door unlocked while you are in the shower. Where can you learn more? Go to [email or web address] and sign in to your MyChart account. Enter [phone] in the [facility] box to learn more about \"Preventing Falls: Care Instructions. \"     If you do not have an account, please click on the \"Sign Up Now\" link. Current as of: April 15, 2020               Content Version: 12.6  © 4660-5781 [employer]. Care instructions adapted under license by [facility] ([facility]). If you have questions about a medical condition or this instruction, always ask your healthcare professional. [place] [address] any warranty or liability for your use of this information.

## 2021-02-23 NOTE — PROGRESS NOTES
Penelope Lovell 476  Internal Medicine Residency Program  [facility] Note      SUBJECTIVE:  PMH:  Afib, rate controlled, Toprol xl 25mg daily, pacerback up, eliquis 5mg BID  Essential HTN (lisinopril 40mg QD, HCTZ 25mg QD)  Type II DM, insulin dependent (Basaglar 35U am, metformin 1000mg BID)  Aquired hypothyroidism (levothyroxine 112mcg)  Iron deficiency anemia  Tremors, likely physiological, consideration to switch toprol to propranolol in future  NIKO,  Intolerant of mask, 2.5L/m NC nightly  Restrictive lung disease 2/2 obesity, dulera  Incidental pulmonary nodule in previous smoker    Health care maintenance:  AAA screen negative  Colon cancer screen to be done in 2022  HCV screen negative    CC: had concerns including Diabetes, Hypertension, Medication Refill, Hypothyroidism, Sleep Apnea, and Atrial Fibrillation. HPI:Wander Rocha presented to the [facility] for a routine visit    HTN: 140/80 this visit; will need home log. Will defer until then to make any changes. Risk of orthopnea and fall have to be consider. But since the patient is diabetic tighter control might be beneficial in the long run. To weight risk and benefit on next visit. Will ask patient to bring it in next time. FBS : 140s-155s; highest 225 was when he was on 70/30 insulin, better since on basaglar; today was 140 in the morning. A1c on this visit 7.1. No adjustment to be made. Was on iron previously and was deemed unlikely to have iron deficiency. Will repeat once. Might benefit for FOBT or even colonoscopy since he is due in 2022. Will check TSH again, which was higher before. Might need adjustment in levothyroxine. Review Of Systems:  General: no fevers, chills, weight loss or gain.    Ears/Nose/Throat: no hearing loss, tinnitus, vertigo, nosebleed, nasal congestion, rhinorrhea, sore throat  Respiratory: no cough, pleuritic chest pain, dyspnea, or wheezing  Cardiovascular: no chest pain, angina, dyspnea on exertion, orthopnea, PND, palpitations, or claudication  Gastrointestinal: no nausea, vomiting, heartburn, diarrhea, constipation, abdominal pain, hematochezia or melena  Genitourinary: no urinary urgency, frequency, dysuria, nocturia, hesitancy, or incontinence  Musculoskeletal: no arthritis, arthralgia, myalgia, weakness, or morning stiffness  Skin: no abnormal pigmentation, rash, itching, masses, hair or nail changes    Current Outpatient Medications on File Prior to Visit   Medication Sig Dispense Refill    apixaban (ELIQUIS) 5 MG TABS tablet TAKE ONE TABLET BY MOUTH TWO TIMES A  tablet 0    insulin glargine (BASAGLAR KWIKPEN) 100 UNIT/ML injection pen Inject 35 Units into the skin every morning . PAP medication 14 pen 3    lisinopril (PRINIVIL;ZESTRIL) 40 MG tablet TAKE ONE TABLET BY MOUTH DAILY 90 tablet 1    metoprolol succinate (TOPROL XL) 25 MG extended release tablet Take 1 tablet by mouth daily 30 tablet 11    atorvastatin (LIPITOR) 40 MG tablet Take 1 tablet by mouth daily 90 tablet 2    metFORMIN (GLUCOPHAGE) 1000 MG tablet TAKE ONE TABLET BY MOUTH TWICE A DAY WITH MEALS 180 tablet 2    levothyroxine (SYNTHROID) 112 MCG tablet TAKE ONE TABLET BY MOUTH EVERY DAY 90 tablet 2    hydroCHLOROthiazide (HYDRODIURIL) 25 MG tablet TAKE ONE TABLET BY MOUTH EVERY DAY 90 tablet 2    Insulin Pen Needle (PEN NEEDLES) 32G X 6 MM MISC 2 times a day, in the morning and before bed 100 each 3    OXYGEN Inhale 2.5 L into the lungs nightly 1 Device 99    Cholecalciferol (VITAMIN D3) 1000 units TABS TAKE TWO TABLETS BY MOUTH EVERY DAY 30 tablet 0    aspirin (ASPIR-LOW) 81 MG EC tablet TAKE ONE TABLET BY MOUTH EVERY DAY 30 tablet 2    mometasone-formoterol (DULERA) 200-5 MCG/ACT inhaler Inhale 2 puffs into the lungs 2 times daily Rinse mouth after using. PAP medication. 3 Inhaler 3    acetaminophen (TYLENOL) 500 MG tablet Take 1,000 mg by mouth daily as needed for Pain.  Lancets MISC Give Delica lancets.  Tests twice a day A.M. And P.M 200 each 5    glucagon 1 MG injection  to inject 1 mg into muscle if you become unconscious for any reason other than suspected hyperglycemia (Patient not taking: Reported on 11/10/2020) 1 kit 3    glucose 4 g chewable tablet Take 4 tablets by mouth as needed for Low blood sugar 60 tablet 3    insulin 70-30 (NOVOLIN 70/30) (70-30) 100 UNIT per ML injection vial Inject 25 Units into the skin 2 times daily (Patient not taking: Reported on 7/28/2020) 6 vial 2    zoster recombinant adjuvanted vaccine (SHINGRIX) 50 MCG/0.5ML SUSR injection 1 dose now and repeat in 2-6 months 0.5 mL 0    ibuprofen (ADVIL;MOTRIN) 600 MG tablet Take 1 tablet by mouth every 8 hours as needed for Pain 60 tablet 1    Nebulizers (NEBULIZER COMPRESSOR) MISC Please provide patient with nebulizer and tubing needed to use equipment. 1 each 0     No current facility-administered medications on file prior to visit. OBJECTIVE:    VS: BP (!) 140/80   Pulse 80   Temp 98.9 °F (37.2 °C) (Oral)   Resp 20   Ht 5' 7\" (1.702 m)   Wt 244 lb (110.7 kg)   BMI 38.22 kg/m²   General appearance: Alert, Awake, Oriented times 3, no distress  Head: atraumatic, normocephalic  Neck: no JVD, trachea midline, no adenopathy  Ear: bilateral grossly equal hearing, external ear normal  Eyes: bilateral pupils are equal, round and reactive, no icterus, no pallor, EOM intact, no nystagmus  Lungs: Lungs clear to auscultation bilaterally. No rhonchi, crackles or wheezes  Heart: S1 S2  Regular rate and rhythm. No rub, murmur or gallop  Abdomen: Abdomen soft but obese, non-tender. non-distended BS normal. No masses, organomegaly, no guarding rebound or rigidity.   Extremities: No edema, Peripheral pulses palpable 2/4  Hematology: no petechia, purpura or ecchymoses, no adenopathy  Neurology: CN grossly intact, power/reflex/bulk bilateral equal in all extremities  Psych: normal behaviour, thought process intact, sleep normal, anhedonia absent, normal energy, normal concentration, no suicidal ideation    ASSESSMENT/PLAN:  Sammy Zayas was seen today for diabetes, hypertension, medication refill, hypothyroidism, sleep apnea and atrial fibrillation. Diagnoses and all orders for this visit:    Type 2 diabetes mellitus with diabetic autonomic neuropathy, with long-term current use of insulin (HCC)  -     blood glucose test strips (ONETOUCH VERIO) strip; TEST IN THE MORNING AND IN THE EVENING  -     POCT glycosylated hemoglobin (Hb A1C)    Hypothyroidism, unspecified type  -     TSH; Future    Essential hypertension  -     COMPREHENSIVE METABOLIC PANEL; Future    Normocytic anemia  -     IRON AND TIBC; Future  -     FERRITIN; Future  -     TRANSFERRIN; Future  -     CBC WITH AUTO DIFFERENTIAL; Future        I have reviewed all pertient PMHx, PSHx, FamHx, Social Hx, medications, and allergies and updated history as appropriate.     RTC: 4months    I have reviewed my findings and recommendations with Cira Rocha and Dr. Daysi Walter MD PGY-2   2/23/2021 11:14 AM

## 2021-02-23 NOTE — PROGRESS NOTES
[facility]  Internal Medicine Residency Clinic    Attending Physician Statement  I have discussed the case, including pertinent history and exam findings with the resident physician. I agree with the assessment, plan and orders as documented by the resident. I have reviewed all pertinent PMHx, PSHx, FamHx, SocialHx, medications, and allergies and updated history as appropriate. Patient presents for routine follow up of medical problems. Pt presented for the follow up of  1. Hypertension - has been in two medications, need home BP log prior to determination on change of medication. 2. DM-2 - has been on basaglar and metformin, AM BG has been 150 and last A1C was 8. Denied hypoglycemic episode. 3. Hypothyroidism - has been on T4 and TSH has been high side last year. 4. Obesity - pt has been seen by Dr. Alfa Hubbard for weight reduction. 5. Restrictive lung dis - has been seen pulmonology  6. INKO - not tolerating CPAP so has been on night time O2 only. Recommended to bring BP log in next visit. Total time spent 35 minutes in this visit. Remainder of medical problems as per resident note.       Manuel Simpson MD  2/23/2021 10:57 AM

## 2021-02-23 NOTE — CARE COORDINATION
the importance of checking blood sugars Hypo and Hyperglycemia signs and symptoms, possible causes and treatment. The importance of Medication adherence. The plate method and meal planning guidelines, what carbohydrates are and how they effect your blood sugar. The benefits of exercise. What uncontrolled diabetes does to your body and reducing the risk of chronic complications. Encouraged to go to diabetic education classes also reminded that a dietitian available. Prevention, detection & treatment of acute complications:    -List symptoms of hyper & hypoglycemia, and prevention & treatment strategies.   -Describe sick day guidelines  DKA /indications for ketone testing &  when to call physician  2/23/2021 SM       Identify severe weather/situation crisis  & diabetes supplies management        Using medications safely:   -State effects of diabetes medicines on blood glucose levels;  -List diabetes medication taken, action & side effects 2/23/2021 SM       Insulin/Injectables  -Name appropriate injection sites; proper storage; supplies needed;  2/23/2021 SM        Demonstrate proper technique        Monitoring blood glucose, interpreting and using results:   -Identify recommended & personal blood glucose targets & HgbA1C target levels  -State the Importance of logging blood glucose levels for pattern recognition;   -State benefits of reading/using pt generated health data  -Verbalize safe lancet disposal 2/23/2021 SM  Checking FBS daily 140-155       -Demonstrate proper testing technique        Incorporating physical activity into lifestyle:   -State effect of exercise on blood glucose levels;   -State benefits of regular exercise;   -Define safety considerations;  -Describe contraindications/maintenance of activity.  [id number] SM       Incorporating nutritional management into lifestyle:   -Describe effect of type, amount & timing of food on blood glucose  -Describe methods for preparing and planning healthy meals 2/23/2021 SM                   Supporting Education Materials/Equipment Provided:     A1c Tracking  Lab Results   Component Value Date    LABA1C 7.1 02/23/2021    LABA1C 8.0 11/10/2020    LABA1C 8.2 07/28/2020         Date Initial A1c 6 month f/u  1 year post ed.  Difference  Comments   11/10/2020 8.0 2/23/2021 7.1               []Patient lost to follow-up : date-      [] Attended DSMES on:  [] Attended yearly DSMES follow-up on:   [] Attended MNTon :

## 2021-03-02 NOTE — PROGRESS NOTES
See [employer] report. Remote monitoring reviewed over a 90 day period. End of 90 day monitoring period date of service 2-.

## 2021-03-09 ENCOUNTER — TELEPHONE (OUTPATIENT)
Dept: NON INVASIVE DIAGNOSTICS | Age: 66
End: 2021-03-09

## 2021-03-09 ENCOUNTER — TELEPHONE (OUTPATIENT)
Dept: INTERNAL MEDICINE | Age: 66
End: 2021-03-09

## 2021-03-09 DIAGNOSIS — I10 ESSENTIAL HYPERTENSION: ICD-10-CM

## 2021-03-09 RX ORDER — HYDROCHLOROTHIAZIDE 25 MG/1
TABLET ORAL
Qty: 90 TABLET | Refills: 0 | Status: SHIPPED
Start: 2021-03-09 | End: 2021-06-04

## 2021-03-09 NOTE — TELEPHONE ENCOUNTER
Last Appointment:  2-23-21  Future Appointments   Date Time Provider Stacie Mtz   5/14/2021  8:00 AM SCHEDULE, REMOTE CHECK [facility] [facility]   6/14/2021 10:00 AM Max Shelton MD [facility]

## 2021-03-09 NOTE — TELEPHONE ENCOUNTER
----- Message from Carmine Munoz RN sent at 3/2/2021  1:47 PM EST -----  Pls schedule overdue EP OV with [facility] [employer] and Successful transmission received. Please call patient and give next appointment.

## 2021-03-12 NOTE — TELEPHONE ENCOUNTER
Spoke with pt's wife. Notified of need to bring B/P log and have lab work done prior to his appt in May.

## 2021-03-25 NOTE — TELEPHONE ENCOUNTER
Last Appointment:  2-23-21  Future Appointments   Date Time Provider Stacie Bibi   4/23/2021 12:00 PM JACQUELINE Chakraborty - CNP ELECTRO PHYS [facility]   5/14/2021  8:00 AM SCHEDULE, REMOTE CHECK [place] [facility]LECT[facility]   6/14/2021 10:00 AM Eva Sexton MD [facility]

## 2021-05-07 DIAGNOSIS — I10 ESSENTIAL HYPERTENSION: ICD-10-CM

## 2021-05-07 DIAGNOSIS — Z79.4 TYPE 2 DIABETES MELLITUS WITH DIABETIC AUTONOMIC NEUROPATHY, WITH LONG-TERM CURRENT USE OF INSULIN (HCC): ICD-10-CM

## 2021-05-07 DIAGNOSIS — E11.43 TYPE 2 DIABETES MELLITUS WITH DIABETIC AUTONOMIC NEUROPATHY, WITH LONG-TERM CURRENT USE OF INSULIN (HCC): ICD-10-CM

## 2021-05-07 DIAGNOSIS — E03.9 ACQUIRED HYPOTHYROIDISM: ICD-10-CM

## 2021-05-07 RX ORDER — LEVOTHYROXINE SODIUM 112 UG/1
TABLET ORAL
Qty: 90 TABLET | Refills: 0 | Status: SHIPPED
Start: 2021-05-07 | End: 2021-08-19

## 2021-05-07 RX ORDER — ATORVASTATIN CALCIUM 40 MG/1
TABLET, FILM COATED ORAL
Qty: 90 TABLET | Refills: 0 | Status: SHIPPED
Start: 2021-05-07 | End: 2021-08-06

## 2021-05-07 RX ORDER — LISINOPRIL 40 MG/1
TABLET ORAL
Qty: 90 TABLET | Refills: 0 | Status: SHIPPED
Start: 2021-05-07 | End: 2021-06-14 | Stop reason: SDUPTHER

## 2021-05-07 NOTE — TELEPHONE ENCOUNTER
Last Appointment:  2-23-21  Future Appointments   Date Time Provider Stacie Bibi   5/14/2021  8:00 AM SCHEDULE, REMOTE CHECK MARKUS ELECTRO PHYS [facility]   5/27/2021 12:00 PM JACQUELINE Landry - CNP ELECTRO PHYS [facility]   6/14/2021 10:00 AM Mitra Sue MD [facility]

## 2021-05-14 ENCOUNTER — NURSE ONLY (OUTPATIENT)
Dept: NON INVASIVE DIAGNOSTICS | Age: 66
End: 2021-05-14
Payer: MEDICARE

## 2021-05-14 DIAGNOSIS — I48.19 PERSISTENT ATRIAL FIBRILLATION (HCC): ICD-10-CM

## 2021-05-14 DIAGNOSIS — Z95.0 PACEMAKER: Primary | ICD-10-CM

## 2021-05-14 DIAGNOSIS — I49.5 SINUS NODE DYSFUNCTION (HCC): ICD-10-CM

## 2021-05-14 DIAGNOSIS — I47.20 VT (VENTRICULAR TACHYCARDIA): ICD-10-CM

## 2021-05-26 PROCEDURE — 93296 REM INTERROG EVL PM/IDS: CPT | Performed by: INTERNAL MEDICINE

## 2021-05-26 PROCEDURE — 93294 REM INTERROG EVL PM/LDLS PM: CPT | Performed by: INTERNAL MEDICINE

## 2021-05-26 NOTE — PROGRESS NOTES
See [facility] report. Remote monitoring reviewed over a 90 day period. End of 90 day monitoring period date of service 5-.

## 2021-05-26 NOTE — PROGRESS NOTES
Hyperlipidemia 01/31/2011       Current Outpatient Medications   Medication Sig Dispense Refill    levothyroxine (SYNTHROID) 112 MCG tablet TAKE ONE TABLET BY MOUTH EVERY DAY 90 tablet 0    atorvastatin (LIPITOR) 40 MG tablet TAKE ONE TABLET BY MOUTH DAILY 90 tablet 0    lisinopril (PRINIVIL;ZESTRIL) 40 MG tablet TAKE ONE TABLET BY MOUTH DAILY 90 tablet 0    apixaban (ELIQUIS) 5 MG TABS tablet TAKE 1 TABLET TWICE A  tablet 0    hydroCHLOROthiazide (HYDRODIURIL) 25 MG tablet TAKE ONE TABLET BY MOUTH EVERY DAY 90 tablet 0    blood glucose test strips (ONETOUCH VERIO) strip TEST IN THE MORNING AND IN THE EVENING 100 each 5    Misc. Devices KIT Dispense 1 blood pressure cuff. 1 kit 0    Lancets MISC Give Delica lancets. Tests twice a day A.M. And P.M 200 each 5    insulin glargine (BASAGLAR KWIKPEN) 100 UNIT/ML injection pen Inject 35 Units into the skin every morning . PAP medication 14 pen 3    metoprolol succinate (TOPROL XL) 25 MG extended release tablet Take 1 tablet by mouth daily 30 tablet 11    metFORMIN (GLUCOPHAGE) 1000 MG tablet TAKE ONE TABLET BY MOUTH TWICE A DAY WITH MEALS 180 tablet 2    Insulin Pen Needle (PEN NEEDLES) 32G X 6 MM MISC 2 times a day, in the morning and before bed 100 each 3    OXYGEN Inhale 2.5 L into the lungs nightly 1 Device 99    Cholecalciferol (VITAMIN D3) 1000 units TABS TAKE TWO TABLETS BY MOUTH EVERY DAY 30 tablet 0    zoster recombinant adjuvanted vaccine (SHINGRIX) 50 MCG/0.5ML SUSR injection 1 dose now and repeat in 2-6 months 0.5 mL 0    aspirin (ASPIR-LOW) 81 MG EC tablet TAKE ONE TABLET BY MOUTH EVERY DAY 30 tablet 2    ibuprofen (ADVIL;MOTRIN) 600 MG tablet Take 1 tablet by mouth every 8 hours as needed for Pain 60 tablet 1    mometasone-formoterol (DULERA) 200-5 MCG/ACT inhaler Inhale 2 puffs into the lungs 2 times daily Rinse mouth after using. PAP medication.  3 Inhaler 3    Nebulizers (NEBULIZER COMPRESSOR) MISC Please provide patient with Atrium   Normal right atrium size.      Mitral Valve   Structurally normal mitral valve.      Tricuspid Valve   The tricuspid valve was not well visualized.   The tricuspid valve appears structurally normal.      Aortic Valve   The aortic valve leaflets were not well visualized.   The aortic valve appears moderately sclerotic.      Pulmonic Valve   The pulmonic valve was not well visualized.      Pericardial Effusion   No evidence of pericardial effusion.      Aorta   Aortic root dimension within normal limits.   Miscellaneous   Dilated Inferior Vena Cava      Conclusions      Summary   Technically suboptimal study   Left ventricular internal dimensions were normal in diastole and systole.   Borderline concentric left ventricular hypertrophy.   Normal left ventricular ejection fraction.   The left atrium is mildly dilated.   Mildly dilated right ventricle.   Right ventricle global systolic function is low normal .   The aortic valve appears moderately sclerotic.   Dilated Inferior Vena Cava      Signature      ----------------------------------------------------------------   Electronically signed by Anna Tovar MD(Interpreting   physician) on 05/26/2017 03:39 PM      Echo 10/20/20:    Findings      Left Ventricle   Normal left ventricular size and systolic function. Ejection fraction is visually estimated at 60-65%. Indeterminate diastolic function. No regional wall motion abnormalities seen. Moderate left ventricular concentric hypertrophy noted. Abnormal LV septal motion consistent with paced rhythm. Right Ventricle   Normal right ventricular size and function. Device lead visualized in right ventricle. Left Atrium   Normal sized left atrium. The interatrial septum appears intact. Right Atrium   Normal right atrial size. Device lead visualized in right atrium. Mitral Valve   Structurally normal mitral valve. No evidence of mitral valve stenosis.    Mild mitral regurgitation. Tricuspid Valve   The tricuspid valve appears structurally normal.   Mild-moderate tricuspid regurgitation. RVSP is 55 mmHg. Estimated PA pressire 55/12 mmHg. Aortic Valve   Aortic valve leaflets are somewhat thickened. The left coronary cusp is focally calcified. The aortic valve is trileaflet. No hemodynamically significant aortic stenosis is present. No evidence of aortic valve regurgitation. Pulmonic Valve   The pulmonic valve was not well visualized. No evidence of pulmonic valve stenosis. Physiologic and/or trace pulmonic regurgitation present. Pericardial Effusion   No evidence of pericardial effusion. Aorta   Aortic root dimension within normal limits. Miscellaneous   Dilated Inferior Vena Cava, normal respiratory variation suggesting mild   elevated of RA pressure, approximately 8 mmHg. Conclusions      Summary   Normal left ventricular size and systolic function. Ejection fraction is visually estimated at 60-65%. Indeterminate diastolic function. No regional wall motion abnormalities seen. Moderate left ventricular concentric hypertrophy noted. Abnormal LV septal motion consistent with paced rhythm. Normal right ventricular size and function. Mild mitral regurgitation. The left coronary cusp of the aortic valve is focally calcified.       Signature      ----------------------------------------------------------------   Electronically signed by Zeenat Cárdenas MD(Interpreting   physician) on 10/20/2020 04:05 PM   ----------------------------------------------------------------      Device Interrogation ( 05/27/21 )  Make/Model Medtronic Advisa   Mode AAIR<--> DDDR 60/120  P wave: 0.5-0.6 mV  Impedance: 437 ohms   Threshold: 0.5 V @ 0.4 ms  RV R wave: 2.8 mV  Impedance: 342 ohms   Threshold: 1 V @ 0.4 ms  Pacing: A: 98%  RV: 4%   Battery Voltage/Longevity:  6.5  Years   Arrhythmias: 1 NSVT on 05/25 1 sec 12 beats V rate 160 bpm, Reprogramming included decreased A sensitivity from 0.3 to 0.15 due to decreased sensing in bi-polar. Overall device function is normal  All device programmable settings were evaluated per above and in the scanned document, along with iterative adjustments (capture thresholds) to assess and select the most appropriate final programming to provide for consistent delivery of the appropriate therapy and to verify function of the device. Assessment:     1. Pacemaker in situ  - DOI 10/3/2017.  - Dual chamber; Medtronic; MRI conditional.  - Indication: Sinus node dysfunction   - Normal device function     2. Persistent atrial fibrillation  - [id number]= 3; On Eliquis  - Toprol XL 25mg QD  - AF burden 0%. - Presents in NSR.  - Re-education on importance of well controlled HTN (goal BP < 130/80), adequate weight control (goal BMI of < 27), adequate glucose control (goal hemoglobin A1c < 6.5), physical activity consisting of moderate cardiopulmonary exercise up to a goal of 250 min/wk, consider sleep study regarding a formal evaluation for sleep apnea, smoking cessation and limited ETOH intake. 3. Nonsustained ventricular tachycardia  - Asymptomatic.  - Echo in May 2017 showed LV EF of 70%. - Echo 10/20/20 LVEF 60-65%   - Toprol 25mg QD     4. Sinus node dysfunction  - S/p pacemaker implantation    5. Hypertension  - Controlled on today's visit   - On Lisinopril, Toprol and HCTZ.     6. Diabetes mellitus  - On Metformin and Insulin. Lab Results   Component Value Date    LABA1C 7.1 02/23/2021     No results found for: EAG  - Management per PCP      7. Restrictive lung disease  - On Dulera. - Management per pulmonary     8. Obesity   Body mass index is 37.9 kg/m². - Recommend weight loss    9. Hyperlipidemia  - On Lipitor. 10. Hypothyroidism  - On Synthroid. 11. Sleep apnea  - States compliance with his CPAP    Plan:     1. Continue Toporl 25mg daily  2. Continue Eliquis 5 mg bid.  (will need lab work prior to next refill has already been ordered in 02/2021, will await for results prior to refilling)  3. Risk factor modifications. 4. Remote transmission every 3 months. 5. Follow-up in one year with Dr. Norris Music or sooner for symptoms. Advised patient to call the office regarding any questions or concerns. I have spent a total of 25 minutes with the patient and the family reviewing the above stated recommendations. And a total of >50% of that time involved face-to-face time providing counseling and or coordination of care with the other providers. Thank you very much to allowing me to participate in the patient's care.     Electronically signed by JACQUELINE Tanner CNP  Cardiac Electrophysiology  [facility]) Physicians  [facility] [place]: [facility]  5/27/21   12:42 PM

## 2021-05-27 ENCOUNTER — OFFICE VISIT (OUTPATIENT)
Dept: NON INVASIVE DIAGNOSTICS | Age: 66
End: 2021-05-27
Payer: MEDICARE

## 2021-05-27 VITALS
OXYGEN SATURATION: 97 % | RESPIRATION RATE: 20 BRPM | HEART RATE: 64 BPM | DIASTOLIC BLOOD PRESSURE: 70 MMHG | BODY MASS INDEX: 37.98 KG/M2 | SYSTOLIC BLOOD PRESSURE: 130 MMHG | WEIGHT: 242 LBS | HEIGHT: 67 IN

## 2021-05-27 DIAGNOSIS — I49.5 SINUS NODE DYSFUNCTION (HCC): ICD-10-CM

## 2021-05-27 DIAGNOSIS — Z95.0 PACEMAKER: Primary | ICD-10-CM

## 2021-05-27 PROCEDURE — 93280 PM DEVICE PROGR EVAL DUAL: CPT | Performed by: NURSE PRACTITIONER

## 2021-05-27 PROCEDURE — 99213 OFFICE O/P EST LOW 20 MIN: CPT | Performed by: NURSE PRACTITIONER

## 2021-06-04 ENCOUNTER — HOSPITAL ENCOUNTER (OUTPATIENT)
Age: 66
Discharge: HOME | End: 2021-06-04
Payer: MEDICARE

## 2021-06-04 DIAGNOSIS — I10 ESSENTIAL HYPERTENSION: Chronic | ICD-10-CM

## 2021-06-04 DIAGNOSIS — E03.9 HYPOTHYROIDISM, UNSPECIFIED TYPE: Chronic | ICD-10-CM

## 2021-06-04 DIAGNOSIS — I10 ESSENTIAL HYPERTENSION: ICD-10-CM

## 2021-06-04 DIAGNOSIS — D64.9 NORMOCYTIC ANEMIA: ICD-10-CM

## 2021-06-04 LAB
ALBUMIN SERPL-MCNC: 3.9 G/DL (ref 3.5–5.2)
ALP BLD-CCNC: 83 U/L (ref 40–129)
ALT SERPL-CCNC: 42 U/L (ref 0–40)
ANION GAP SERPL CALCULATED.3IONS-SCNC: 13 MMOL/L (ref 7–16)
AST SERPL-CCNC: 26 U/L (ref 0–39)
BASOPHILS ABSOLUTE: 0.04 E9/L (ref 0–0.2)
BASOPHILS RELATIVE PERCENT: 0.6 % (ref 0–2)
BILIRUB SERPL-MCNC: 0.5 MG/DL (ref 0–1.2)
BUN BLDV-MCNC: 29 MG/DL (ref 6–23)
CALCIUM SERPL-MCNC: 9.4 MG/DL (ref 8.6–10.2)
CHLORIDE BLD-SCNC: 101 MMOL/L (ref 98–107)
CO2: 24 MMOL/L (ref 22–29)
CREAT SERPL-MCNC: 1.2 MG/DL (ref 0.7–1.2)
EOSINOPHILS ABSOLUTE: 0.28 E9/L (ref 0.05–0.5)
EOSINOPHILS RELATIVE PERCENT: 4.2 % (ref 0–6)
FERRITIN: 52 NG/ML
GFR AFRICAN AMERICAN: >60
GFR NON-AFRICAN AMERICAN: >60 ML/MIN/1.73
GLUCOSE BLD-MCNC: 145 MG/DL (ref 74–99)
HCT VFR BLD CALC: 36.1 % (ref 37–54)
HEMOGLOBIN: 11.7 G/DL (ref 12.5–16.5)
IMMATURE GRANULOCYTES #: 0.18 E9/L
IMMATURE GRANULOCYTES %: 2.7 % (ref 0–5)
IRON SATURATION: 26 % (ref 20–55)
IRON: 82 MCG/DL (ref 59–158)
LYMPHOCYTES ABSOLUTE: 1.28 E9/L (ref 1.5–4)
LYMPHOCYTES RELATIVE PERCENT: 19.3 % (ref 20–42)
MCH RBC QN AUTO: 29.8 PG (ref 26–35)
MCHC RBC AUTO-ENTMCNC: 32.4 % (ref 32–34.5)
MCV RBC AUTO: 92.1 FL (ref 80–99.9)
MONOCYTES ABSOLUTE: 0.61 E9/L (ref 0.1–0.95)
MONOCYTES RELATIVE PERCENT: 9.2 % (ref 2–12)
NEUTROPHILS ABSOLUTE: 4.23 E9/L (ref 1.8–7.3)
NEUTROPHILS RELATIVE PERCENT: 64 % (ref 43–80)
PDW BLD-RTO: 14.4 FL (ref 11.5–15)
PLATELET # BLD: 187 E9/L (ref 130–450)
PMV BLD AUTO: 9.1 FL (ref 7–12)
POTASSIUM SERPL-SCNC: 5 MMOL/L (ref 3.5–5)
RBC # BLD: 3.92 E12/L (ref 3.8–5.8)
SODIUM BLD-SCNC: 138 MMOL/L (ref 132–146)
TOTAL IRON BINDING CAPACITY: 310 MCG/DL (ref 250–450)
TOTAL PROTEIN: 7.2 G/DL (ref 6.4–8.3)
TRANSFERRIN: 289 MG/DL (ref 200–360)
TSH SERPL DL<=0.05 MIU/L-ACNC: 5.94 UIU/ML (ref 0.27–4.2)
WBC # BLD: 6.6 E9/L (ref 4.5–11.5)

## 2021-06-04 PROCEDURE — 82728 ASSAY OF FERRITIN: CPT

## 2021-06-04 PROCEDURE — 80053 COMPREHEN METABOLIC PANEL: CPT

## 2021-06-04 PROCEDURE — 83540 ASSAY OF IRON: CPT

## 2021-06-04 PROCEDURE — 83550 IRON BINDING TEST: CPT

## 2021-06-04 PROCEDURE — 85025 COMPLETE CBC W/AUTO DIFF WBC: CPT

## 2021-06-04 PROCEDURE — 36415 COLL VENOUS BLD VENIPUNCTURE: CPT

## 2021-06-04 PROCEDURE — 84443 ASSAY THYROID STIM HORMONE: CPT

## 2021-06-04 PROCEDURE — 84466 ASSAY OF TRANSFERRIN: CPT

## 2021-06-04 RX ORDER — HYDROCHLOROTHIAZIDE 25 MG/1
TABLET ORAL
Qty: 90 TABLET | Refills: 0 | Status: SHIPPED
Start: 2021-06-04 | End: 2021-06-14 | Stop reason: SDUPTHER

## 2021-06-04 NOTE — TELEPHONE ENCOUNTER
Last Appointment:  2/2021  Future Appointments   Date Time Provider Stacie Mtz   6/14/2021 10:00 AM Valeria Flores MD [facility]   8/13/2021  8:15 AM SCHEDULE, REMOTE CHECK MARKUS ELECTRO PHYS [facility]

## 2021-06-14 ENCOUNTER — HOSPITAL ENCOUNTER (OUTPATIENT)
Age: 66
Discharge: HOME | End: 2021-06-14
Payer: MEDICARE

## 2021-06-14 ENCOUNTER — OFFICE VISIT (OUTPATIENT)
Dept: INTERNAL MEDICINE | Age: 66
End: 2021-06-14
Payer: MEDICARE

## 2021-06-14 VITALS
DIASTOLIC BLOOD PRESSURE: 68 MMHG | HEIGHT: 67 IN | BODY MASS INDEX: 38.14 KG/M2 | SYSTOLIC BLOOD PRESSURE: 138 MMHG | RESPIRATION RATE: 16 BRPM | TEMPERATURE: 97.7 F | HEART RATE: 59 BPM | WEIGHT: 243 LBS

## 2021-06-14 DIAGNOSIS — E11.65 TYPE 2 DIABETES MELLITUS WITH HYPERGLYCEMIA, WITH LONG-TERM CURRENT USE OF INSULIN (HCC): ICD-10-CM

## 2021-06-14 DIAGNOSIS — I10 ESSENTIAL HYPERTENSION: ICD-10-CM

## 2021-06-14 DIAGNOSIS — M54.40 BACK PAIN OF LUMBAR REGION WITH SCIATICA: ICD-10-CM

## 2021-06-14 DIAGNOSIS — Z00.00 HEALTHCARE MAINTENANCE: ICD-10-CM

## 2021-06-14 DIAGNOSIS — E03.9 HYPOTHYROIDISM, UNSPECIFIED TYPE: Primary | ICD-10-CM

## 2021-06-14 DIAGNOSIS — Z79.4 TYPE 2 DIABETES MELLITUS WITH HYPERGLYCEMIA, WITH LONG-TERM CURRENT USE OF INSULIN (HCC): ICD-10-CM

## 2021-06-14 DIAGNOSIS — E11.43 TYPE 2 DIABETES MELLITUS WITH DIABETIC AUTONOMIC NEUROPATHY, WITH LONG-TERM CURRENT USE OF INSULIN (HCC): ICD-10-CM

## 2021-06-14 DIAGNOSIS — E03.9 HYPOTHYROIDISM, UNSPECIFIED TYPE: ICD-10-CM

## 2021-06-14 DIAGNOSIS — Z79.4 TYPE 2 DIABETES MELLITUS WITH DIABETIC AUTONOMIC NEUROPATHY, WITH LONG-TERM CURRENT USE OF INSULIN (HCC): ICD-10-CM

## 2021-06-14 LAB
CHOLESTEROL, TOTAL: 120 MG/DL (ref 0–199)
CREATININE URINE: 45 MG/DL (ref 40–278)
HBA1C MFR BLD: 7.4 %
HDLC SERPL-MCNC: 24 MG/DL
LDL CHOLESTEROL CALCULATED: 63 MG/DL (ref 0–99)
MICROALBUMIN UR-MCNC: 26.5 MG/L
MICROALBUMIN/CREAT UR-RTO: 58.9 (ref 0–30)
T3 TOTAL: 91.66 NG/DL (ref 80–200)
T4 FREE: 1.28 NG/DL (ref 0.93–1.7)
TRIGL SERPL-MCNC: 165 MG/DL (ref 0–149)
VITAMIN D 25-HYDROXY: 34 NG/ML (ref 30–100)
VLDLC SERPL CALC-MCNC: 33 MG/DL

## 2021-06-14 PROCEDURE — 82306 VITAMIN D 25 HYDROXY: CPT

## 2021-06-14 PROCEDURE — 99212 OFFICE O/P EST SF 10 MIN: CPT | Performed by: INTERNAL MEDICINE

## 2021-06-14 PROCEDURE — 84439 ASSAY OF FREE THYROXINE: CPT

## 2021-06-14 PROCEDURE — 3051F HG A1C>EQUAL 7.0%<8.0%: CPT | Performed by: INTERNAL MEDICINE

## 2021-06-14 PROCEDURE — 83036 HEMOGLOBIN GLYCOSYLATED A1C: CPT | Performed by: INTERNAL MEDICINE

## 2021-06-14 PROCEDURE — 80061 LIPID PANEL: CPT

## 2021-06-14 PROCEDURE — 82044 UR ALBUMIN SEMIQUANTITATIVE: CPT

## 2021-06-14 PROCEDURE — 36415 COLL VENOUS BLD VENIPUNCTURE: CPT

## 2021-06-14 PROCEDURE — 84480 ASSAY TRIIODOTHYRONINE (T3): CPT

## 2021-06-14 PROCEDURE — 82570 ASSAY OF URINE CREATININE: CPT

## 2021-06-14 PROCEDURE — 99214 OFFICE O/P EST MOD 30 MIN: CPT | Performed by: INTERNAL MEDICINE

## 2021-06-14 RX ORDER — INSULIN GLARGINE 100 [IU]/ML
35 INJECTION, SOLUTION SUBCUTANEOUS EVERY MORNING
Qty: 14 PEN | Refills: 3 | Status: SHIPPED
Start: 2021-06-14 | End: 2021-06-22 | Stop reason: ALTCHOICE

## 2021-06-14 RX ORDER — HYDROCHLOROTHIAZIDE 25 MG/1
25 TABLET ORAL DAILY
Qty: 90 TABLET | Refills: 0 | Status: SHIPPED | OUTPATIENT
Start: 2021-06-14 | End: 2021-09-13 | Stop reason: SDUPTHER

## 2021-06-14 RX ORDER — LISINOPRIL 40 MG/1
40 TABLET ORAL DAILY
Qty: 90 TABLET | Refills: 0 | Status: SHIPPED | OUTPATIENT
Start: 2021-06-14 | End: 2021-09-13 | Stop reason: SDUPTHER

## 2021-06-14 SDOH — ECONOMIC STABILITY: FOOD INSECURITY: WITHIN THE PAST 12 MONTHS, THE FOOD YOU BOUGHT JUST DIDN'T LAST AND YOU DIDN'T HAVE MONEY TO GET MORE.: SOMETIMES TRUE

## 2021-06-14 SDOH — ECONOMIC STABILITY: TRANSPORTATION INSECURITY
IN THE PAST 12 MONTHS, HAS LACK OF TRANSPORTATION KEPT YOU FROM MEETINGS, WORK, OR FROM GETTING THINGS NEEDED FOR DAILY LIVING?: NO

## 2021-06-14 SDOH — ECONOMIC STABILITY: TRANSPORTATION INSECURITY
IN THE PAST 12 MONTHS, HAS THE LACK OF TRANSPORTATION KEPT YOU FROM MEDICAL APPOINTMENTS OR FROM GETTING MEDICATIONS?: NO

## 2021-06-14 SDOH — ECONOMIC STABILITY: FOOD INSECURITY: WITHIN THE PAST 12 MONTHS, YOU WORRIED THAT YOUR FOOD WOULD RUN OUT BEFORE YOU GOT MONEY TO BUY MORE.: SOMETIMES TRUE

## 2021-06-14 ASSESSMENT — ENCOUNTER SYMPTOMS
BACK PAIN: 1
ABDOMINAL PAIN: 0
SHORTNESS OF BREATH: 0
COUGH: 0

## 2021-06-14 ASSESSMENT — SOCIAL DETERMINANTS OF HEALTH (SDOH): HOW HARD IS IT FOR YOU TO PAY FOR THE VERY BASICS LIKE FOOD, HOUSING, MEDICAL CARE, AND HEATING?: NOT VERY HARD

## 2021-06-14 NOTE — PATIENT INSTRUCTIONS
Please complete your lab work today   Bring BG and BP log next visit   Continue current medications   Watch diet, lose weight   Return in 3months

## 2021-06-14 NOTE — PROGRESS NOTES
levothyroxine (SYNTHROID) 112 MCG tablet TAKE ONE TABLET BY MOUTH EVERY DAY 90 tablet 0    atorvastatin (LIPITOR) 40 MG tablet TAKE ONE TABLET BY MOUTH DAILY 90 tablet 0    metoprolol succinate (TOPROL XL) 25 MG extended release tablet Take 1 tablet by mouth daily 30 tablet 11    Cholecalciferol (VITAMIN D3) 1000 units TABS TAKE TWO TABLETS BY MOUTH EVERY DAY 30 tablet 0    aspirin (ASPIR-LOW) 81 MG EC tablet TAKE ONE TABLET BY MOUTH EVERY DAY 30 tablet 2    mometasone-formoterol (DULERA) 200-5 MCG/ACT inhaler Inhale 2 puffs into the lungs 2 times daily Rinse mouth after using. PAP medication. 3 Inhaler 3    acetaminophen (TYLENOL) 500 MG tablet Take 1,000 mg by mouth daily as needed for Pain.  blood glucose test strips (ONETOUCH VERIO) strip TEST IN THE MORNING AND IN THE EVENING 100 each 5    Misc. Devices KIT Dispense 1 blood pressure cuff. 1 kit 0    Lancets MISC Give Delica lancets. Tests twice a day A.M. And P.M 200 each 5    Insulin Pen Needle (PEN NEEDLES) 32G X 6 MM MISC 2 times a day, in the morning and before bed 100 each 3    OXYGEN Inhale 2.5 L into the lungs nightly 1 Device 99    zoster recombinant adjuvanted vaccine (SHINGRIX) 50 MCG/0.5ML SUSR injection 1 dose now and repeat in 2-6 months 0.5 mL 0    Nebulizers (NEBULIZER COMPRESSOR) MISC Please provide patient with nebulizer and tubing needed to use equipment. 1 each 0     No current facility-administered medications on file prior to visit. OBJECTIVE:    VS:   Vitals:    06/14/21 0944 06/14/21 0949   BP: (!) 141/70 138/68   Pulse: 61 59   Resp: 16    Temp: 97.7 °F (36.5 °C)    TempSrc: Oral    Weight: 243 lb (110.2 kg)    Height: 5' 7\" (1.702 m)      Physical Exam  Constitutional:       Appearance: He is well-developed. HENT:      Head: Normocephalic and atraumatic. Cardiovascular:      Rate and Rhythm: Normal rate and regular rhythm. Pulses: Normal pulses.            Dorsalis pedis pulses are 2+ on the right side and 2+ on the left side. Posterior tibial pulses are 2+ on the right side and 2+ on the left side. Heart sounds: Normal heart sounds, S1 normal and S2 normal. No murmur heard. No S3 or S4 sounds. Pulmonary:      Effort: Pulmonary effort is normal. No respiratory distress. Breath sounds: Normal breath sounds. Abdominal:      General: Bowel sounds are normal.      Palpations: Abdomen is soft. There is no hepatomegaly. Tenderness: There is no abdominal tenderness. Musculoskeletal:      Lumbar back: Tenderness present. Right foot: No deformity. Left foot: No deformity. Feet:      Right foot:      Protective Sensation: 10 sites tested. 10 sites sensed. Skin integrity: No ulcer or callus. Left foot:      Protective Sensation: 10 sites tested. 10 sites sensed. Skin integrity: No ulcer or callus. ASSESSMENT/PLAN:  1. Hypothyroidism, unspecified type  -     T4, FREE; Future  -     T3; Future  2. Type 2 diabetes mellitus with hyperglycemia, with long-term current use of insulin ([facility])  -     insulin glargine (BASAGLAR KWIKPEN) 100 UNIT/ML injection pen; Inject 35 Units into the skin every morning . PAP medication, Disp-14 pen, R-3Normal  -     POCT glycosylated hemoglobin (Hb A1C)  -     MICROALBUMIN / CREATININE URINE RATIO; Future  3. Type 2 diabetes mellitus with diabetic autonomic neuropathy, with long-term current use of insulin ([facility])  -     metFORMIN (GLUCOPHAGE) 1000 MG tablet; TAKE ONE TABLET BY MOUTH TWICE A DAY WITH MEALS, Disp-180 tablet, R-2Normal  4. Essential hypertension  -     LIPID PANEL; Future  -     lisinopril (PRINIVIL;ZESTRIL) 40 MG tablet; Take 1 tablet by mouth daily, Disp-90 tablet, R-0Normal  -     hydroCHLOROthiazide (HYDRODIURIL) 25 MG tablet; Take 1 tablet by mouth daily, Disp-90 tablet, R-0Normal  5. Healthcare maintenance  -     Vitamin D 25 Hydroxy; Future  6.  Back pain of lumbar region with sciatica  -     MRI LUMBAR SPINE WO CONTRAST; Future       RTC:  Return in about 3 months (around 9/14/2021) for [facility] up lab works, recheck BP, Follow up blood glucose. I have reviewed my findings and recommendations with Jackelin Rocha and Dr. Merrick Schaeffer.     Daniele Hollis MD   6/14/2021 11:54 AM

## 2021-06-14 NOTE — PROGRESS NOTES
[facility]    Attending Physician Statement  I have discussed the case, including pertinent history and exam findings with the resident physician. I agree with the assessment, plan and orders as documented by the resident. I have reviewed all pertinent PMHx, PSHx, FamHx, SocialHx, medications, and allergies and updated history as appropriate. Patient here for routine follow up of medical problems. 1. HTN, controlled  2. DM, insulin - requiring. a1c 7.5. Intentional weight loss noted. Would continue same regimen and re assess. Tolerating statin, no microalbuminuria. BP controlled. Renal function okay. 3. HLD, tolerating statin  4. Hypothyroidism. TSH 5.940. Get FT4 today, adjust synthroid. 5. PAF, rate controlled and tolerating eliquis  6. Obesity - Advised referral for weight loss management (last seen by Dr Venessa Agarwal in Feb 2020) but patient would like to defer at this time  7. Re-assess vit d level  8. Re-order MRI lumbar spine for work up chronic back pain. No red flags at this time. Imaging and follow up with PMR. Last suggestion in 2016 was NS referral because of failed conservative management but this has not been done. AWV next visit    Remainder of medical problems as per resident note. Emily Kaplan MD  6/14/2021 10:29 AM

## 2021-06-14 NOTE — PROGRESS NOTES
Pt screened positive for SDOH related to financial strain and or food insecurity and declined further contact for assessment/resources. Discharge instructions reviewed with patient per . AVS given to patient. Patient does not have future appt due to schedule not being available.        POCT A1C: 7.4

## 2021-06-18 ENCOUNTER — TELEPHONE (OUTPATIENT)
Dept: OTHER | Age: 66
End: 2021-06-18

## 2021-06-18 DIAGNOSIS — I48.19 PERSISTENT ATRIAL FIBRILLATION (HCC): Primary | ICD-10-CM

## 2021-06-18 DIAGNOSIS — I48.19 PERSISTENT ATRIAL FIBRILLATION (HCC): ICD-10-CM

## 2021-06-18 NOTE — TELEPHONE ENCOUNTER
CHW called [facility] pharmacy. Per [facility] pharmacist pt received Eliquis refill thru [id number] [place] [address]  on April 23 and will be due for a new refill in July. CHW contacted and confirmed with mail pharmacy next refill is due in July. CHW contacted pt. Pt's wife informed pt has Eliquis for another month.

## 2021-06-22 ENCOUNTER — TELEPHONE (OUTPATIENT)
Dept: INTERNAL MEDICINE | Age: 66
End: 2021-06-22

## 2021-06-22 DIAGNOSIS — E11.43 TYPE 2 DIABETES MELLITUS WITH DIABETIC AUTONOMIC NEUROPATHY, WITHOUT LONG-TERM CURRENT USE OF INSULIN (HCC): Primary | ICD-10-CM

## 2021-06-22 RX ORDER — INSULIN GLARGINE 100 [IU]/ML
35 INJECTION, SOLUTION SUBCUTANEOUS NIGHTLY
Qty: 5 PEN | Refills: 3 | Status: SHIPPED
Start: 2021-06-22 | End: 2021-07-06 | Stop reason: ALTCHOICE

## 2021-06-22 NOTE — TELEPHONE ENCOUNTER
Pharmacy called at 10:25. Gustavo Ruiz is non formulary and not covered by insurance. This needs to be replaced with something else and pharmacy did not know what is covered.    Juliana Anaya [phone]

## 2021-06-22 NOTE — TELEPHONE ENCOUNTER
It appears to me that his insurance will cover Lantus, Toujeo, Novolog, Lispro, Novolin, Apidra    Will you please change insulin to something that is covered? Please advise. Thank you.

## 2021-06-25 ENCOUNTER — TELEPHONE (OUTPATIENT)
Dept: INTERNAL MEDICINE | Age: 66
End: 2021-06-25

## 2021-06-25 NOTE — TELEPHONE ENCOUNTER
Patient came into the office with questions about his medication and the PAP program. His insulin was ordered and sent to [address] [place] [address]. He picked his insulin up. I spoke with Jeffrey Boxer from [facility] who stated that he was still on the program and that she has just recently submitted a refill request for Lantus. This was explained to the patient. He questioned the change to taking his insulin at night. He noramlly takes it during the day. Dr Eduar Kaplan stated that he can continue to take his insulin in the am. Patient verbalized understanding.

## 2021-07-06 DIAGNOSIS — Z79.4 TYPE 2 DIABETES MELLITUS WITH DIABETIC AUTONOMIC NEUROPATHY, WITH LONG-TERM CURRENT USE OF INSULIN (HCC): Primary | ICD-10-CM

## 2021-07-06 DIAGNOSIS — E11.43 TYPE 2 DIABETES MELLITUS WITH DIABETIC AUTONOMIC NEUROPATHY, WITH LONG-TERM CURRENT USE OF INSULIN (HCC): Primary | ICD-10-CM

## 2021-07-06 RX ORDER — INSULIN GLARGINE 100 [IU]/ML
35 INJECTION, SOLUTION SUBCUTANEOUS EVERY MORNING
Qty: 5 PEN | Refills: 0
Start: 2021-07-06 | End: 2021-11-22 | Stop reason: SDUPTHER

## 2021-07-06 NOTE — TELEPHONE ENCOUNTER
Pt able to get Basaglar thru PAP program. Pt also states he takes his insulin in the AM. Ok to change per Dr. Kostas Anand.

## 2021-07-08 ENCOUNTER — HOSPITAL ENCOUNTER (OUTPATIENT)
Dept: MRI IMAGING | Age: 66
Discharge: HOME | End: 2021-07-10
Payer: MEDICARE

## 2021-07-08 DIAGNOSIS — M54.40 BACK PAIN OF LUMBAR REGION WITH SCIATICA: ICD-10-CM

## 2021-07-08 NOTE — PROCEDURES
Patient arrived for MRI, however he has a pacemaker. Before proceeding with MRI the patient needs to provide scheduling/or his Dr's office information on the pacer so it can be checked for MRI compatibility- if compatible then it will need rescheduled with the pacemaker representative to set the pacer to MRI mode. Called scheduling to reach out to patient in order to get information and r/s if compatible. Faxing note to dr office of cancelling if MRI appt today.

## 2021-08-03 RX ORDER — METOPROLOL SUCCINATE 25 MG/1
TABLET, EXTENDED RELEASE ORAL
Qty: 30 TABLET | Refills: 11 | Status: SHIPPED
Start: 2021-08-03 | End: 2021-11-22 | Stop reason: SDUPTHER

## 2021-08-06 DIAGNOSIS — Z79.4 TYPE 2 DIABETES MELLITUS WITH DIABETIC AUTONOMIC NEUROPATHY, WITH LONG-TERM CURRENT USE OF INSULIN (HCC): ICD-10-CM

## 2021-08-06 DIAGNOSIS — E11.43 TYPE 2 DIABETES MELLITUS WITH DIABETIC AUTONOMIC NEUROPATHY, WITH LONG-TERM CURRENT USE OF INSULIN (HCC): ICD-10-CM

## 2021-08-06 RX ORDER — ATORVASTATIN CALCIUM 40 MG/1
TABLET, FILM COATED ORAL
Qty: 90 TABLET | Refills: 0 | Status: SHIPPED
Start: 2021-08-06 | End: 2021-09-27 | Stop reason: SDUPTHER

## 2021-08-19 DIAGNOSIS — E03.9 ACQUIRED HYPOTHYROIDISM: ICD-10-CM

## 2021-08-19 RX ORDER — LEVOTHYROXINE SODIUM 112 UG/1
TABLET ORAL
Qty: 30 TABLET | Refills: 0 | Status: SHIPPED
Start: 2021-08-19 | End: 2021-09-13 | Stop reason: SDUPTHER

## 2021-08-19 NOTE — TELEPHONE ENCOUNTER
Last Appointment:  6/14/21 RTO 3 months  Future Appointments   Date Time Provider Stacie Mtz   9/13/2021  9:00 AM Zaid Jacinto MD [facility]

## 2021-08-23 DIAGNOSIS — I48.19 PERSISTENT ATRIAL FIBRILLATION (HCC): ICD-10-CM

## 2021-08-23 NOTE — TELEPHONE ENCOUNTER
Pt has new insurance.  Needs refill to go to [facility]  Last Appointment:  6/14/21  Future Appointments   Date Time Provider Stacie Mtz   9/13/2021  9:00 AM Jignesh Solano MD [facility]

## 2021-08-30 ENCOUNTER — TELEPHONE (OUTPATIENT)
Dept: INTERNAL MEDICINE | Age: 66
End: 2021-08-30

## 2021-08-30 NOTE — TELEPHONE ENCOUNTER
Pt called LSW stating he has been out of his Eliquis as of 8.28.21 and was unsure if it was ordered; see 90 day supply sent to [facility];LSW called and found pt's copay is $239 and they were awaiting permission from pt to charge to card; did review with PAP and pt has no active enrollment for Eliquis; would need POI (last year taxes ) and pharmacy printout with out of pocket expenses for consideration. LSW spoke with pt's wife and advised of above. Dr. Lopez Butler signed out 30 day supply of PAP and wife came to ;   She will talk with insurance as had been $80 in past; advised of what is needed to pursue for PAP if needed

## 2021-09-10 DIAGNOSIS — E11.43 TYPE 2 DIABETES MELLITUS WITH DIABETIC AUTONOMIC NEUROPATHY, WITH LONG-TERM CURRENT USE OF INSULIN (HCC): ICD-10-CM

## 2021-09-10 DIAGNOSIS — Z79.4 TYPE 2 DIABETES MELLITUS WITH DIABETIC AUTONOMIC NEUROPATHY, WITH LONG-TERM CURRENT USE OF INSULIN (HCC): ICD-10-CM

## 2021-09-10 RX ORDER — BLOOD SUGAR DIAGNOSTIC
STRIP MISCELLANEOUS
Qty: 100 EACH | Refills: 5 | OUTPATIENT
Start: 2021-09-10

## 2021-09-10 NOTE — TELEPHONE ENCOUNTER
Last Appointment:  Visit date not found  Future Appointments   Date Time Provider Stacie Mtz   9/13/2021  9:00 AM Gavin Sarmiento MD [facility]      Error.  Will address at appointment on Monday

## 2021-09-13 ENCOUNTER — SOCIAL WORK (OUTPATIENT)
Dept: INTERNAL MEDICINE | Age: 66
End: 2021-09-13

## 2021-09-13 ENCOUNTER — OFFICE VISIT (OUTPATIENT)
Dept: INTERNAL MEDICINE | Age: 66
End: 2021-09-13
Payer: MEDICARE

## 2021-09-13 VITALS
TEMPERATURE: 98.3 F | SYSTOLIC BLOOD PRESSURE: 137 MMHG | HEIGHT: 67 IN | BODY MASS INDEX: 41.67 KG/M2 | RESPIRATION RATE: 20 BRPM | DIASTOLIC BLOOD PRESSURE: 66 MMHG | HEART RATE: 59 BPM | OXYGEN SATURATION: 100 % | WEIGHT: 265.5 LBS

## 2021-09-13 DIAGNOSIS — E66.01 OBESITY, CLASS III, BMI 40-49.9 (MORBID OBESITY) (HCC): ICD-10-CM

## 2021-09-13 DIAGNOSIS — R60.1 ANASARCA: Primary | ICD-10-CM

## 2021-09-13 DIAGNOSIS — I10 ESSENTIAL HYPERTENSION: ICD-10-CM

## 2021-09-13 DIAGNOSIS — Z59.9 FINANCIAL PROBLEMS: ICD-10-CM

## 2021-09-13 DIAGNOSIS — E03.9 ACQUIRED HYPOTHYROIDISM: ICD-10-CM

## 2021-09-13 DIAGNOSIS — J44.9 CHRONIC OBSTRUCTIVE PULMONARY DISEASE, UNSPECIFIED COPD TYPE (HCC): ICD-10-CM

## 2021-09-13 PROCEDURE — 99214 OFFICE O/P EST MOD 30 MIN: CPT | Performed by: STUDENT IN AN ORGANIZED HEALTH CARE EDUCATION/TRAINING PROGRAM

## 2021-09-13 RX ORDER — FUROSEMIDE 40 MG/1
40 TABLET ORAL 2 TIMES DAILY
Qty: 60 TABLET | Refills: 0 | Status: SHIPPED
Start: 2021-09-13 | End: 2021-09-17

## 2021-09-13 RX ORDER — FUROSEMIDE 40 MG/1
40 TABLET ORAL 2 TIMES DAILY
Qty: 60 TABLET | Refills: 0 | Status: SHIPPED
Start: 2021-09-13 | End: 2021-09-13

## 2021-09-13 RX ORDER — HYDROCHLOROTHIAZIDE 25 MG/1
25 TABLET ORAL DAILY
Qty: 90 TABLET | Refills: 0 | Status: ON HOLD
Start: 2021-09-13 | End: 2021-09-24 | Stop reason: HOSPADM

## 2021-09-13 RX ORDER — LISINOPRIL 40 MG/1
40 TABLET ORAL DAILY
Qty: 90 TABLET | Refills: 0 | Status: ON HOLD
Start: 2021-09-13 | End: 2021-09-24 | Stop reason: HOSPADM

## 2021-09-13 RX ORDER — POTASSIUM CHLORIDE 20 MEQ/1
40 TABLET, EXTENDED RELEASE ORAL DAILY
Qty: 60 TABLET | Refills: 0 | Status: ON HOLD
Start: 2021-09-13 | End: 2021-09-24 | Stop reason: HOSPADM

## 2021-09-13 RX ORDER — LEVOTHYROXINE SODIUM 112 UG/1
TABLET ORAL
Qty: 90 TABLET | Refills: 0 | Status: ON HOLD
Start: 2021-09-13 | End: 2021-09-24 | Stop reason: HOSPADM

## 2021-09-13 SDOH — ECONOMIC STABILITY - INCOME SECURITY: PROBLEM RELATED TO HOUSING AND ECONOMIC CIRCUMSTANCES, UNSPECIFIED: Z59.9

## 2021-09-13 ASSESSMENT — ENCOUNTER SYMPTOMS
SHORTNESS OF BREATH: 1
ABDOMINAL PAIN: 1
COUGH: 1
SORE THROAT: 0
CONSTIPATION: 0
WHEEZING: 1
VOMITING: 0
DIARRHEA: 0
NAUSEA: 0
ABDOMINAL DISTENTION: 1

## 2021-09-13 NOTE — PATIENT INSTRUCTIONS
Dear Kilo Rocha,        Thank you for coming to your appointment today. I hope we have addressed all of your needs. Please make sure to do the following:  - Continue your medications as listed. - You were started on Lasix 40 mg twice daily and a daily potassium supplement  - Get labs drawn before our next follow up, go 2-3 days prior to the next visit. - We will see each other again in 2 weeks, if your swelling and shortness of breath are not improving in 1 week, please go to the ER. Call for a sooner appointment if you develop worsening swelling, shortness of breath    Have a great day!     Sincerely,  Anna Hutchison M.D  9/13/2021  10:39 AM

## 2021-09-13 NOTE — PROGRESS NOTES
Pt called from the waiting room. Upon walking back to room, pt with increased SOB. Pulse ox obtained and O2 sat 88% with ambulation on room air. Repeat Pulse ox obtained. Pt at rest on room air 94%  Pt room air with ambulation 83%  O2 2.5 L via NC applied and pulse ox 93% with ambulation  Pt at rest with O2 2.5 NC 98%. Discharge instructions reviewed with patient. Pt notified if work excuse letter needed to please contact office. Discharge instructions reviewed with patient. Patient verbalizes understanding. Lab scripts and AVS given to patient. Pt downstairs to lobby via wheelchair.

## 2021-09-13 NOTE — PROGRESS NOTES
[facility] [address] [address]  Internal Medicine Residency Program  [facility] Note      SUBJECTIVE:  CC: had concerns including Medication Refill and Diabetes. HPI:  Josue Toledo is a 74 y.o.male with PMH of HTN, IDDM2, HLD, Hypothyroidism, Persistent AF, Sinus Node Dysfunction with internal pacemaker in place, Obesity, NIKO, Obesity Hypoventilation Syndrome, Vitamin D deficiency, Chronic back pain presenting to [facility] for 3 month follow up. When walking towards his room, pt was noted to be SOB, pulse oximeter was placed and O2 noted to be 88%, improved with oxygen nasal cannula and rest. Noticed over the last month he is SOB when walking short distances and going up 10 steps. Noticed more cough than usual as well, noticed green sputum coming out as well. Uses oxygen at home but only at night at 2.5 L. Noticed he is eating less but gained 15 lbs, also having pain that extends from RLQ to Right flank, also has pruritus over the area. First noticed 2 weeks ago. States he received the shingles vaccine about 1 year ago at [facility]. States legs are edematous today, he usually wears compression stockings which reduce the swelling, but swelling is persistent now. States he takes HCTZ but no other diuretics. State he could not get the MRI of his lumbar spine, pt was supposed to follow up with PMR, but never did. Review of Systems   Constitutional: Positive for unexpected weight change (recent 15 lb weight gain). Negative for activity change, appetite change, chills, fatigue and fever. HENT: Negative for postnasal drip and sore throat. Respiratory: Positive for cough, shortness of breath and wheezing. Cardiovascular: Positive for leg swelling. Negative for chest pain and palpitations. Gastrointestinal: Positive for abdominal distention and abdominal pain. Negative for constipation, diarrhea, nausea and vomiting. Genitourinary: Negative for difficulty urinating and dysuria. Neurological: Negative for dizziness, syncope, light-headedness and headaches. Psychiatric/Behavioral: Negative for behavioral problems. Outpatient Medications Marked as Taking for the 9/13/21 encounter (Office Visit) with Nahun Luz MD   Medication Sig Dispense Refill    potassium chloride (KLOR-CON M) 20 MEQ extended release tablet Take 2 tablets by mouth daily 60 tablet 0    furosemide (LASIX) 40 MG tablet Take 1 tablet by mouth 2 times daily 60 tablet 0    Misc. Devices MISC Please add portability to current O2 order. Resp to evaluate patient for OCD device. 1 each 0    hydroCHLOROthiazide (HYDRODIURIL) 25 MG tablet Take 1 tablet by mouth daily 90 tablet 0    levothyroxine (SYNTHROID) 112 MCG tablet TAKE ONE TABLET BY MOUTH EVERY DAY 90 tablet 0    lisinopril (PRINIVIL;ZESTRIL) 40 MG tablet Take 1 tablet by mouth daily 90 tablet 0    apixaban (ELIQUIS) 5 MG TABS tablet Take 1 tablet by mouth 2 times daily 60 tablet 0    apixaban (ELIQUIS) 5 MG TABS tablet TAKE 1 TABLET TWICE A  tablet 0    atorvastatin (LIPITOR) 40 MG tablet TAKE ONE TABLET BY MOUTH DAILY 90 tablet 0    metoprolol succinate (TOPROL XL) 25 MG extended release tablet TAKE ONE TABLET BY MOUTH DAILY 30 tablet 11    insulin glargine (BASAGLAR KWIKPEN) 100 UNIT/ML injection pen Inject 35 Units into the skin every morning PAP medication 5 pen 0    metFORMIN (GLUCOPHAGE) 1000 MG tablet TAKE ONE TABLET BY MOUTH TWICE A DAY WITH MEALS 180 tablet 2    blood glucose test strips (ONETOUCH VERIO) strip TEST IN THE MORNING AND IN THE EVENING 100 each 5    Lancets MISC Give Delica lancets. Tests twice a day A.M.  And P.M 200 each 5    Insulin Pen Needle (PEN NEEDLES) 32G X 6 MM MISC 2 times a day, in the morning and before bed 100 each 3    OXYGEN Inhale 2.5 L into the lungs nightly 1 Device 99    Cholecalciferol (VITAMIN D3) 1000 units TABS TAKE TWO TABLETS BY MOUTH EVERY DAY 30 tablet 0    aspirin (ASPIR-LOW) 81 MG EC tablet TAKE ONE TABLET BY MOUTH EVERY DAY 30 tablet 2    mometasone-formoterol (DULERA) 200-5 MCG/ACT inhaler Inhale 2 puffs into the lungs 2 times daily Rinse mouth after using. PAP medication. 3 Inhaler 3    Nebulizers (NEBULIZER COMPRESSOR) MISC Please provide patient with nebulizer and tubing needed to use equipment. 1 each 0       OBJECTIVE:    VS:   /66 (Site: Right Upper Arm, Position: Sitting, Cuff Size: Medium Adult)   Pulse 59   Temp 98.3 °F (36.8 °C) (Oral)   Resp 20   Ht 5' 7\" (1.702 m)   Wt 265 lb 8 oz (120.4 kg)   SpO2 100% Comment: at rest with O2 2L NC  BMI 41.58 kg/m²     EXAM:  Physical Exam  Vitals reviewed. Constitutional:       General: He is not in acute distress. Appearance: Normal appearance. He is obese. Interventions: Nasal cannula in place. HENT:      Head: Normocephalic and atraumatic. Eyes:      Extraocular Movements: Extraocular movements intact. Pupils: Pupils are equal, round, and reactive to light. Cardiovascular:      Rate and Rhythm: Normal rate and regular rhythm. Pulses: Normal pulses. Heart sounds: Normal heart sounds. Pulmonary:      Effort: Pulmonary effort is normal.      Breath sounds: Examination of the right-middle field reveals rales. Examination of the left-middle field reveals rales. Examination of the right-lower field reveals rales. Examination of the left-lower field reveals rales. Rales present. No wheezing or rhonchi. Abdominal:      General: Abdomen is protuberant. Bowel sounds are normal. There is distension. Palpations: There is no fluid wave. Tenderness: There is abdominal tenderness. There is no right CVA tenderness, guarding or rebound. Negative signs include Garnica's sign. Hernia: No hernia is present. Comments: Abdominal area noted to be edematous   Musculoskeletal:      Cervical back: Neck supple. Right lower le+ Pitting Edema present.       Left lower le+ Pitting Edema present. Skin:     Capillary Refill: Capillary refill takes less than 2 seconds. Neurological:      General: No focal deficit present. Mental Status: He is alert. Mental status is at baseline. Psychiatric:         Mood and Affect: Mood normal.         Behavior: Behavior normal.         ASSESSMENT/PLAN:  I have reviewed all pertinent PMH, PSH, FH, SH, medications and allergies and updated history as appropriate. Sancho Lee was seen today for medication refill and diabetes. Diagnoses and all orders for this visit:    Anasarca  - Possibly 2/2 ADHFpEF(Echo 10/2020 EF 60-65% with indeterminate diastolic function) vs RADHA  -     BASIC METABOLIC PANEL; Future  - [address]  -     CBC WITH AUTO DIFFERENTIAL; Future  -     potassium chloride (KLOR-CON M) 20 MEQ extended release tablet; Take 2 tablets by mouth daily  -     furosemide (LASIX) 40 MG tablet; Take 1 tablet by mouth 2 times daily  - Pt will monitor if improving over the next week, spoke to the pt's wife as well who will monitor him closely, if no improvement over the next week pt will come to the ED and likely get admitted to the hospital  - Decide on continuing or adjusting lasix during next visit in 2 weeks    Chronic obstructive pulmonary disease, unspecified COPD type ([address]  - Has a component of Restrictive lung disease 2/2 Obesity as well  -     [id number]; Future  -     Misc. Devices MISC; Please add portability to current O2 order. Resp to evaluate patient for OCD device.   - Pt will now use oxygen at all times rather than just at night  - Continue Dulera  - Pt has appointment with Dr. Zenobia Nice 1/4/2022    Financial problems  -     [facility] Referral to Social Work, spoke to [employer] today about medications costs, was having issues affording Eliquis    Obesity, Class III, BMI 40-49.9 (morbid obesity) ([address]  -     [address]    Essential hypertension  - hydroCHLOROthiazide (HYDRODIURIL) 25 MG tablet; Take 1 tablet by mouth daily  -     lisinopril (PRINIVIL;ZESTRIL) 40 MG tablet;  Take 1 tablet by mouth daily    Acquired hypothyroidism  -     levothyroxine (SYNTHROID) 112 MCG tablet; TAKE ONE TABLET BY MOUTH EVERY DAY    Right Lower Abdominal Tenderness and Pruritus        - Unlikely to be Shingles given pt is currently vaccinated and pain appears to be more to deeper palpation rather than the skin itself, likely combination of local skin irritation and anasarca        - Continue to monitor as patient gets diuresed, likely to improve abdominal distention and provide relief      RTC: 2 weeks to check status on edema and SOB      I have reviewed my findings and recommendations with Caterina Rocha and Dr Chance Vinson MD PGY-1  9/13/2021 7:18 PM

## 2021-09-13 NOTE — PROGRESS NOTES
Met with pt during IM appt; pt brought paperwork which is wife gathered(pharmacy printout and her income) as well as note stating 3 month Eliquis was $200. Discussed with MARTY Marin of PAP and she will meet with pt today and complete PAP application for [employer] desk reported pt had no insurance info; reviewed cards with pt  and gave  [employer] and LSW left message for wife to confirm      Met with pt's wife as she had IM appt this afternoon; she reports pt's active insurnace is the [employer]; reviewed [employer] which authorizes discussion of treatment with wife. Discussed LSW concern re: pt's status decline; need for portable oxygen (RN handling with [address]) ; suggested for Dr. 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    Wife aware that 2020 tax return is needed to complete Eliquis PAP

## 2021-09-13 NOTE — PROGRESS NOTES
[facility]  Internal Medicine Clinic    Attending Physician Statement:  Argentina Palma M.D., F.A.C.P. I have discussed the case, including pertinent history and exam findings with the resident/NP. I have seen and examined the patient and the key elements of the encounter have been performed by me. I agree with the resident ROS, PMHx, PSHx, meds reviewed and assessment, plan and orders as documented by the resident/NP        Clinic charts reviewed, including other providers notes, relevant labs and imaging. dm2    88% ambulation pulse ox. -- better with rest and o2 >90%  Only prescribed o2 for night    +crackles +volume overload  Currently taking hctz 25 QD-- not taking loop diuretic  Currently in sinus regular,  Hx of afib  /66 (Site: Right Upper Arm, Position: Sitting, Cuff Size: Medium Adult)   Pulse 59   Temp 98.3 °F (36.8 °C) (Oral)   Resp 20   Ht 5' 7\" (1.702 m)   Wt 265 lb 8 oz (120.4 kg)   SpO2 100% Comment: at rest with O2 2L NC  BMI 41.58 kg/m²   bp controlled  Copd interrmittent wheezing - not currently  More so with activity  No bronchospasm flares with rest ONLY  +BALL deconditioning  Weakness sit to stand- difficult  Morbid obeisty    R flank pain x4 weeks  Shingles? No active rash/now or in past  ?tenderness superficial vs deep  ?dsyaesthesia   R lower flank area  Also adipose looks swollen/tense  == ?source of discomfort due to anasarca    Start loop diuretic   40mg lasix bid with K+  Attempt to get home o2 for daytime use  Attempt to get home PT/OT/nurse visit  If not improving then go to ER (8am preferred time)  No gi issues, no fevers      >50% of time spent coordinating care with other providers and/or counseling patient/family  Remainder of medical problems as per resident note.

## 2021-09-17 ENCOUNTER — TELEPHONE (OUTPATIENT)
Dept: INTERNAL MEDICINE | Age: 66
End: 2021-09-17

## 2021-09-17 ENCOUNTER — HOSPITAL ENCOUNTER (INPATIENT)
Age: 66
LOS: 7 days | Discharge: HOME | DRG: 291 | End: 2021-09-24
Attending: STUDENT IN AN ORGANIZED HEALTH CARE EDUCATION/TRAINING PROGRAM | Admitting: INTERNAL MEDICINE
Payer: MEDICARE

## 2021-09-17 ENCOUNTER — APPOINTMENT (OUTPATIENT)
Dept: GENERAL RADIOLOGY | Age: 66
DRG: 291 | End: 2021-09-17
Payer: MEDICARE

## 2021-09-17 DIAGNOSIS — I10 ESSENTIAL HYPERTENSION: ICD-10-CM

## 2021-09-17 DIAGNOSIS — G47.33 OSA (OBSTRUCTIVE SLEEP APNEA): ICD-10-CM

## 2021-09-17 DIAGNOSIS — J18.9 PNEUMONIA DUE TO INFECTIOUS ORGANISM, UNSPECIFIED LATERALITY, UNSPECIFIED PART OF LUNG: ICD-10-CM

## 2021-09-17 DIAGNOSIS — R60.1 ANASARCA: ICD-10-CM

## 2021-09-17 DIAGNOSIS — E87.5 HYPERKALEMIA: ICD-10-CM

## 2021-09-17 DIAGNOSIS — E66.09 CLASS 1 OBESITY DUE TO EXCESS CALORIES WITH SERIOUS COMORBIDITY IN ADULT, UNSPECIFIED BMI: ICD-10-CM

## 2021-09-17 DIAGNOSIS — E66.9 RESTRICTIVE LUNG DISEASE SECONDARY TO OBESITY: ICD-10-CM

## 2021-09-17 DIAGNOSIS — I50.9 CONGESTIVE HEART FAILURE, UNSPECIFIED HF CHRONICITY, UNSPECIFIED HEART FAILURE TYPE (HCC): Primary | ICD-10-CM

## 2021-09-17 DIAGNOSIS — J98.4 RESTRICTIVE LUNG DISEASE SECONDARY TO OBESITY: ICD-10-CM

## 2021-09-17 DIAGNOSIS — R91.8 LUNG NODULES: ICD-10-CM

## 2021-09-17 DIAGNOSIS — I50.31 ACUTE HEART FAILURE WITH PRESERVED EJECTION FRACTION (HFPEF) (HCC): ICD-10-CM

## 2021-09-17 LAB
ALBUMIN SERPL-MCNC: 4.1 G/DL (ref 3.5–5.2)
ALP BLD-CCNC: 92 U/L (ref 40–129)
ALT SERPL-CCNC: 22 U/L (ref 0–40)
ANION GAP SERPL CALCULATED.3IONS-SCNC: 5 MMOL/L (ref 7–16)
AST SERPL-CCNC: 20 U/L (ref 0–39)
BASOPHILS ABSOLUTE: 0.09 E9/L (ref 0–0.2)
BASOPHILS RELATIVE PERCENT: 1.1 % (ref 0–2)
BILIRUB SERPL-MCNC: 0.6 MG/DL (ref 0–1.2)
BILIRUBIN URINE: NEGATIVE
BLOOD, URINE: NEGATIVE
BUN BLDV-MCNC: 33 MG/DL (ref 6–23)
CALCIUM SERPL-MCNC: 8.7 MG/DL (ref 8.6–10.2)
CHLORIDE BLD-SCNC: 106 MMOL/L (ref 98–107)
CLARITY: CLEAR
CO2: 27 MMOL/L (ref 22–29)
COLOR: YELLOW
CREAT SERPL-MCNC: 1.4 MG/DL (ref 0.7–1.2)
EOSINOPHILS ABSOLUTE: 0.39 E9/L (ref 0.05–0.5)
EOSINOPHILS RELATIVE PERCENT: 4.6 % (ref 0–6)
GFR AFRICAN AMERICAN: >60
GFR NON-AFRICAN AMERICAN: 50 ML/MIN/1.73
GLUCOSE BLD-MCNC: 109 MG/DL (ref 74–99)
GLUCOSE URINE: NEGATIVE MG/DL
HCT VFR BLD CALC: 36.6 % (ref 37–54)
HEMOGLOBIN: 11.1 G/DL (ref 12.5–16.5)
IMMATURE GRANULOCYTES #: 0.17 E9/L
IMMATURE GRANULOCYTES %: 2 % (ref 0–5)
KETONES, URINE: NEGATIVE MG/DL
LACTIC ACID, SEPSIS: 0.9 MMOL/L (ref 0.5–1.9)
LEUKOCYTE ESTERASE, URINE: NEGATIVE
LYMPHOCYTES ABSOLUTE: 1.38 E9/L (ref 1.5–4)
LYMPHOCYTES RELATIVE PERCENT: 16.3 % (ref 20–42)
MCH RBC QN AUTO: 30.2 PG (ref 26–35)
MCHC RBC AUTO-ENTMCNC: 30.3 % (ref 32–34.5)
MCV RBC AUTO: 99.7 FL (ref 80–99.9)
METER GLUCOSE: 224 MG/DL (ref 74–99)
MONOCYTES ABSOLUTE: 0.83 E9/L (ref 0.1–0.95)
MONOCYTES RELATIVE PERCENT: 9.8 % (ref 2–12)
NEUTROPHILS ABSOLUTE: 5.63 E9/L (ref 1.8–7.3)
NEUTROPHILS RELATIVE PERCENT: 66.2 % (ref 43–80)
NITRITE, URINE: NEGATIVE
PDW BLD-RTO: 15.3 FL (ref 11.5–15)
PH UA: 5 (ref 5–9)
PLATELET # BLD: 247 E9/L (ref 130–450)
PMV BLD AUTO: 9.5 FL (ref 7–12)
POTASSIUM REFLEX MAGNESIUM: 6.3 MMOL/L (ref 3.5–5)
PRO-BNP: 1596 PG/ML (ref 0–125)
PROTEIN UA: NEGATIVE MG/DL
RBC # BLD: 3.67 E12/L (ref 3.8–5.8)
SARS-COV-2, NAAT: NOT DETECTED
SODIUM BLD-SCNC: 138 MMOL/L (ref 132–146)
SPECIFIC GRAVITY UA: 1.02 (ref 1–1.03)
TOTAL PROTEIN: 7.4 G/DL (ref 6.4–8.3)
TROPONIN, HIGH SENSITIVITY: 41 NG/L (ref 0–11)
UROBILINOGEN, URINE: 0.2 E.U./DL
WBC # BLD: 8.5 E9/L (ref 4.5–11.5)

## 2021-09-17 PROCEDURE — 87040 BLOOD CULTURE FOR BACTERIA: CPT

## 2021-09-17 PROCEDURE — 81003 URINALYSIS AUTO W/O SCOPE: CPT

## 2021-09-17 PROCEDURE — 2580000003 HC RX 258: Performed by: STUDENT IN AN ORGANIZED HEALTH CARE EDUCATION/TRAINING PROGRAM

## 2021-09-17 PROCEDURE — 80053 COMPREHEN METABOLIC PANEL: CPT

## 2021-09-17 PROCEDURE — 83605 ASSAY OF LACTIC ACID: CPT

## 2021-09-17 PROCEDURE — 85025 COMPLETE CBC W/AUTO DIFF WBC: CPT

## 2021-09-17 PROCEDURE — 6370000000 HC RX 637 (ALT 250 FOR IP): Performed by: STUDENT IN AN ORGANIZED HEALTH CARE EDUCATION/TRAINING PROGRAM

## 2021-09-17 PROCEDURE — 80048 BASIC METABOLIC PNL TOTAL CA: CPT

## 2021-09-17 PROCEDURE — 96365 THER/PROPH/DIAG IV INF INIT: CPT

## 2021-09-17 PROCEDURE — 87635 SARS-COV-2 COVID-19 AMP PRB: CPT

## 2021-09-17 PROCEDURE — 82962 GLUCOSE BLOOD TEST: CPT

## 2021-09-17 PROCEDURE — 2500000003 HC RX 250 WO HCPCS: Performed by: STUDENT IN AN ORGANIZED HEALTH CARE EDUCATION/TRAINING PROGRAM

## 2021-09-17 PROCEDURE — 99283 EMERGENCY DEPT VISIT LOW MDM: CPT

## 2021-09-17 PROCEDURE — 6360000002 HC RX W HCPCS: Performed by: STUDENT IN AN ORGANIZED HEALTH CARE EDUCATION/TRAINING PROGRAM

## 2021-09-17 PROCEDURE — 96375 TX/PRO/DX INJ NEW DRUG ADDON: CPT

## 2021-09-17 PROCEDURE — 83880 ASSAY OF NATRIURETIC PEPTIDE: CPT

## 2021-09-17 PROCEDURE — 71046 X-RAY EXAM CHEST 2 VIEWS: CPT

## 2021-09-17 PROCEDURE — 36415 COLL VENOUS BLD VENIPUNCTURE: CPT

## 2021-09-17 PROCEDURE — 93005 ELECTROCARDIOGRAM TRACING: CPT | Performed by: PHYSICIAN ASSISTANT

## 2021-09-17 PROCEDURE — 2060000000 HC ICU INTERMEDIATE R&B

## 2021-09-17 PROCEDURE — 84484 ASSAY OF TROPONIN QUANT: CPT

## 2021-09-17 RX ORDER — DEXTROSE MONOHYDRATE 25 G/50ML
12.5 INJECTION, SOLUTION INTRAVENOUS PRN
Status: DISCONTINUED | OUTPATIENT
Start: 2021-09-17 | End: 2021-09-18 | Stop reason: SDUPTHER

## 2021-09-17 RX ORDER — FUROSEMIDE 40 MG/1
40 TABLET ORAL DAILY
Status: ON HOLD | COMMUNITY
End: 2021-09-24 | Stop reason: HOSPADM

## 2021-09-17 RX ORDER — FUROSEMIDE 10 MG/ML
40 INJECTION INTRAMUSCULAR; INTRAVENOUS ONCE
Status: COMPLETED | OUTPATIENT
Start: 2021-09-17 | End: 2021-09-17

## 2021-09-17 RX ORDER — DEXTROSE MONOHYDRATE 25 G/50ML
25 INJECTION, SOLUTION INTRAVENOUS ONCE
Status: COMPLETED | OUTPATIENT
Start: 2021-09-17 | End: 2021-09-17

## 2021-09-17 RX ORDER — NAPROXEN SODIUM 220 MG
220 TABLET ORAL 2 TIMES DAILY PRN
COMMUNITY
End: 2021-09-27 | Stop reason: SINTOL

## 2021-09-17 RX ORDER — DEXTROSE MONOHYDRATE 50 MG/ML
100 INJECTION, SOLUTION INTRAVENOUS PRN
Status: DISCONTINUED | OUTPATIENT
Start: 2021-09-17 | End: 2021-09-18 | Stop reason: SDUPTHER

## 2021-09-17 RX ORDER — NICOTINE POLACRILEX 4 MG
15 LOZENGE BUCCAL PRN
Status: DISCONTINUED | OUTPATIENT
Start: 2021-09-17 | End: 2021-09-18 | Stop reason: SDUPTHER

## 2021-09-17 RX ORDER — DOXYCYCLINE HYCLATE 100 MG/1
100 CAPSULE ORAL ONCE
Status: COMPLETED | OUTPATIENT
Start: 2021-09-17 | End: 2021-09-17

## 2021-09-17 RX ADMIN — Medication 1000 MG: at 21:28

## 2021-09-17 RX ADMIN — CEFTRIAXONE SODIUM 2000 MG: 2 INJECTION, POWDER, FOR SOLUTION INTRAMUSCULAR; INTRAVENOUS at 21:33

## 2021-09-17 RX ADMIN — SODIUM BICARBONATE 50 MEQ: 84 INJECTION, SOLUTION INTRAVENOUS at 21:33

## 2021-09-17 RX ADMIN — FUROSEMIDE 40 MG: 10 INJECTION, SOLUTION INTRAMUSCULAR; INTRAVENOUS at 21:29

## 2021-09-17 RX ADMIN — INSULIN HUMAN 10 UNITS: 100 INJECTION, SOLUTION PARENTERAL at 22:09

## 2021-09-17 RX ADMIN — DEXTROSE MONOHYDRATE 25 G: 25 INJECTION, SOLUTION INTRAVENOUS at 21:27

## 2021-09-17 RX ADMIN — DOXYCYCLINE HYCLATE 100 MG: 100 CAPSULE ORAL at 21:34

## 2021-09-17 ASSESSMENT — ENCOUNTER SYMPTOMS
COUGH: 1
SHORTNESS OF BREATH: 1

## 2021-09-17 NOTE — ED NOTES
Bed: 17B-17  Expected date:   Expected time:   Means of arrival:   Comments:  triage     Leti Valero RN  09/17/21 [id number]

## 2021-09-17 NOTE — TELEPHONE ENCOUNTER
Call to [phone] today and verified pt was delivered portable tanks yesterday and respiratory will be scheduling  Evaluation for portable concentrator      9.17.21  [id number]  Received call from pt's wife stating he has been taking his water pills as prescribed and is not urinating much and that he refuses to use portable tanks out of the house; advised of respiratory therapist to evaluate for portable oxygen concentrator.   Phone given to Dr. Azeb Alejandra who advised for pt to come to ED

## 2021-09-17 NOTE — ED NOTES
FIRST PROVIDER CONTACT ASSESSMENT NOTE       Department of Emergency Medicine   9/17/21  4:12 PM EDT    Chief Complaint: Shortness of Breath (pt reports increased sob. pt was seen by pcp on monday and given water pills and reports they arent working. )    History of Present Illness:   Merlin Peraza is a 74 y.o. male who presents to the ED for shortness of breath that has been increasing since Monday. He states he has gained 20 pounds over the past several days and has increase in leg swelling. He denies chest pain or abdominal pain. Patient wears oxygen at home at night only. He is hypoxic at 86% room air. Oxygen applied in triage       Focused Physical Exam:  VS:  BP (!) 145/79   Pulse 88   Resp 18   SpO2 (!) 87%      General: Alert and in mild distress   CVS: Regular rate for age, normal rhythm  Resp: Diminished, mild respiratory distress    Medical History:  has a past medical history of Atrial fibrillation ([address] Carpal tunnel syndrome, Hyperlipidemia, Hypertension, Hypothyroidism, Lung nodule, Nocturnal hypoxemia due to obesity, Obesity, NIKO (obstructive sleep apnea), Osteoarthritis, Pinched nerve, Restrictive lung disease secondary to obesity, Sinus node dysfunction ([address] and Type II or unspecified type diabetes mellitus without mention of complication, not stated as uncontrolled. Surgical History:  has a past surgical history that includes Colonoscopy (2007); back surgery (2012); eye surgery; hernia repair; Colonoscopy (06/04/12); back surgery (05/30/2017); pacemaker placement (10/03/2017); and Rotator cuff repair (Right). Social History:  reports that he quit smoking about 16 years ago. His smoking use included cigarettes. He has a 3.50 pack-year smoking history. He has quit using smokeless tobacco. He reports that he does not drink alcohol and does not use drugs.     Family History: family history includes Cancer in his brother and mother; Diabetes in his brother; Heart Disease in his mother; High Blood Pressure in his brother and father. *ALLERGIES*     Patient has no known allergies.      Initial Plan of Care:  Initiate Treatment-Testing, Proceed toTreatment Area When Bed Available for ED Attending/MLP to Continue Care    -----------------END OF FIRST PROVIDER CONTACT ASSESSMENT NOTE--------------  Electronically signed by Isaiah Virk PA-C   DD: 9/17/21         Isaiah Virk PA-C  09/17/21 [id number]

## 2021-09-18 ENCOUNTER — APPOINTMENT (OUTPATIENT)
Dept: CT IMAGING | Age: 66
DRG: 291 | End: 2021-09-18
Payer: MEDICARE

## 2021-09-18 LAB
ALBUMIN SERPL-MCNC: 3.9 G/DL (ref 3.5–5.2)
ALP BLD-CCNC: 85 U/L (ref 40–129)
ALT SERPL-CCNC: 20 U/L (ref 0–40)
ANION GAP SERPL CALCULATED.3IONS-SCNC: 10 MMOL/L (ref 7–16)
ANION GAP SERPL CALCULATED.3IONS-SCNC: 6 MMOL/L (ref 7–16)
ANION GAP SERPL CALCULATED.3IONS-SCNC: 7 MMOL/L (ref 7–16)
ANION GAP SERPL CALCULATED.3IONS-SCNC: 9 MMOL/L (ref 7–16)
AST SERPL-CCNC: 18 U/L (ref 0–39)
BASOPHILS ABSOLUTE: 0.07 E9/L (ref 0–0.2)
BASOPHILS RELATIVE PERCENT: 0.8 % (ref 0–2)
BILIRUB SERPL-MCNC: 0.5 MG/DL (ref 0–1.2)
BUN BLDV-MCNC: 29 MG/DL (ref 6–23)
BUN BLDV-MCNC: 31 MG/DL (ref 6–23)
BUN BLDV-MCNC: 31 MG/DL (ref 6–23)
BUN BLDV-MCNC: 32 MG/DL (ref 6–23)
C-REACTIVE PROTEIN: 0.3 MG/DL (ref 0–0.4)
CALCIUM SERPL-MCNC: 7.8 MG/DL (ref 8.6–10.2)
CALCIUM SERPL-MCNC: 8.4 MG/DL (ref 8.6–10.2)
CALCIUM SERPL-MCNC: 8.6 MG/DL (ref 8.6–10.2)
CALCIUM SERPL-MCNC: 9.1 MG/DL (ref 8.6–10.2)
CHLORIDE BLD-SCNC: 104 MMOL/L (ref 98–107)
CHLORIDE BLD-SCNC: 105 MMOL/L (ref 98–107)
CHLORIDE BLD-SCNC: 106 MMOL/L (ref 98–107)
CHLORIDE BLD-SCNC: 109 MMOL/L (ref 98–107)
CHLORIDE URINE RANDOM: 134 MMOL/L
CK MB: 2.7 NG/ML (ref 0–7.7)
CO2: 21 MMOL/L (ref 22–29)
CO2: 23 MMOL/L (ref 22–29)
CO2: 26 MMOL/L (ref 22–29)
CO2: 26 MMOL/L (ref 22–29)
CREAT SERPL-MCNC: 1.2 MG/DL (ref 0.7–1.2)
CREAT SERPL-MCNC: 1.2 MG/DL (ref 0.7–1.2)
CREAT SERPL-MCNC: 1.3 MG/DL (ref 0.7–1.2)
CREAT SERPL-MCNC: 1.4 MG/DL (ref 0.7–1.2)
EKG ATRIAL RATE: 76 BPM
EKG Q-T INTERVAL: 420 MS
EKG QRS DURATION: 152 MS
EKG QTC CALCULATION (BAZETT): 472 MS
EKG R AXIS: -133 DEGREES
EKG T AXIS: 39 DEGREES
EKG VENTRICULAR RATE: 76 BPM
EOSINOPHILS ABSOLUTE: 0.35 E9/L (ref 0.05–0.5)
EOSINOPHILS RELATIVE PERCENT: 4.1 % (ref 0–6)
GFR AFRICAN AMERICAN: >60
GFR NON-AFRICAN AMERICAN: 50 ML/MIN/1.73
GFR NON-AFRICAN AMERICAN: 55 ML/MIN/1.73
GFR NON-AFRICAN AMERICAN: >60 ML/MIN/1.73
GFR NON-AFRICAN AMERICAN: >60 ML/MIN/1.73
GLUCOSE BLD-MCNC: 148 MG/DL (ref 74–99)
GLUCOSE BLD-MCNC: 156 MG/DL (ref 74–99)
GLUCOSE BLD-MCNC: 157 MG/DL (ref 74–99)
GLUCOSE BLD-MCNC: 174 MG/DL (ref 74–99)
GLUCOSE BLD-MCNC: 184 MG/DL
HCT VFR BLD CALC: 33.8 % (ref 37–54)
HEMOGLOBIN: 10.3 G/DL (ref 12.5–16.5)
IMMATURE GRANULOCYTES #: 0.13 E9/L
IMMATURE GRANULOCYTES %: 1.5 % (ref 0–5)
LACTIC ACID, SEPSIS: 0.9 MMOL/L (ref 0.5–1.9)
LYMPHOCYTES ABSOLUTE: 1.2 E9/L (ref 1.5–4)
LYMPHOCYTES RELATIVE PERCENT: 14 % (ref 20–42)
MAGNESIUM: 1.6 MG/DL (ref 1.6–2.6)
MCH RBC QN AUTO: 30.4 PG (ref 26–35)
MCHC RBC AUTO-ENTMCNC: 30.5 % (ref 32–34.5)
MCV RBC AUTO: 99.7 FL (ref 80–99.9)
METER GLUCOSE: 131 MG/DL (ref 74–99)
METER GLUCOSE: 143 MG/DL (ref 74–99)
METER GLUCOSE: 154 MG/DL (ref 74–99)
METER GLUCOSE: 188 MG/DL (ref 74–99)
METER GLUCOSE: 196 MG/DL (ref 74–99)
MONOCYTES ABSOLUTE: 0.75 E9/L (ref 0.1–0.95)
MONOCYTES RELATIVE PERCENT: 8.8 % (ref 2–12)
NEUTROPHILS ABSOLUTE: 6.05 E9/L (ref 1.8–7.3)
NEUTROPHILS RELATIVE PERCENT: 70.8 % (ref 43–80)
PDW BLD-RTO: 15.2 FL (ref 11.5–15)
PLATELET # BLD: 227 E9/L (ref 130–450)
PMV BLD AUTO: 9.7 FL (ref 7–12)
POTASSIUM REFLEX MAGNESIUM: 5.3 MMOL/L (ref 3.5–5)
POTASSIUM REFLEX MAGNESIUM: 5.5 MMOL/L (ref 3.5–5)
POTASSIUM REFLEX MAGNESIUM: 6.1 MMOL/L (ref 3.5–5)
POTASSIUM REFLEX MAGNESIUM: 6.1 MMOL/L (ref 3.5–5)
POTASSIUM, UR: 16.8 MMOL/L
PROCALCITONIN: 0.11 NG/ML (ref 0–0.08)
RBC # BLD: 3.39 E12/L (ref 3.8–5.8)
SEDIMENTATION RATE, ERYTHROCYTE: 31 MM/HR (ref 0–15)
SODIUM BLD-SCNC: 136 MMOL/L (ref 132–146)
SODIUM BLD-SCNC: 137 MMOL/L (ref 132–146)
SODIUM BLD-SCNC: 139 MMOL/L (ref 132–146)
SODIUM BLD-SCNC: 140 MMOL/L (ref 132–146)
SODIUM URINE: 122 MMOL/L
TOTAL CK: 61 U/L (ref 20–200)
TOTAL PROTEIN: 6.9 G/DL (ref 6.4–8.3)
TROPONIN, HIGH SENSITIVITY: 43 NG/L (ref 0–11)
UREA NITROGEN, UR: 144 MG/DL (ref 800–1666)
WBC # BLD: 8.6 E9/L (ref 4.5–11.5)

## 2021-09-18 PROCEDURE — 2580000003 HC RX 258: Performed by: INTERNAL MEDICINE

## 2021-09-18 PROCEDURE — 85025 COMPLETE CBC W/AUTO DIFF WBC: CPT

## 2021-09-18 PROCEDURE — 84540 ASSAY OF URINE/UREA-N: CPT

## 2021-09-18 PROCEDURE — 6370000000 HC RX 637 (ALT 250 FOR IP): Performed by: INTERNAL MEDICINE

## 2021-09-18 PROCEDURE — 86140 C-REACTIVE PROTEIN: CPT

## 2021-09-18 PROCEDURE — 2500000003 HC RX 250 WO HCPCS: Performed by: INTERNAL MEDICINE

## 2021-09-18 PROCEDURE — 82553 CREATINE MB FRACTION: CPT

## 2021-09-18 PROCEDURE — 82962 GLUCOSE BLOOD TEST: CPT

## 2021-09-18 PROCEDURE — 6360000002 HC RX W HCPCS: Performed by: INTERNAL MEDICINE

## 2021-09-18 PROCEDURE — 94664 DEMO&/EVAL PT USE INHALER: CPT

## 2021-09-18 PROCEDURE — 83735 ASSAY OF MAGNESIUM: CPT

## 2021-09-18 PROCEDURE — 71250 CT THORAX DX C-: CPT

## 2021-09-18 PROCEDURE — 94660 CPAP INITIATION&MGMT: CPT

## 2021-09-18 PROCEDURE — 85651 RBC SED RATE NONAUTOMATED: CPT

## 2021-09-18 PROCEDURE — 2060000000 HC ICU INTERMEDIATE R&B

## 2021-09-18 PROCEDURE — 80053 COMPREHEN METABOLIC PANEL: CPT

## 2021-09-18 PROCEDURE — 84145 PROCALCITONIN (PCT): CPT

## 2021-09-18 PROCEDURE — 2700000000 HC OXYGEN THERAPY PER DAY

## 2021-09-18 PROCEDURE — 93010 ELECTROCARDIOGRAM REPORT: CPT | Performed by: INTERNAL MEDICINE

## 2021-09-18 PROCEDURE — 82550 ASSAY OF CK (CPK): CPT

## 2021-09-18 PROCEDURE — 84300 ASSAY OF URINE SODIUM: CPT

## 2021-09-18 PROCEDURE — 36415 COLL VENOUS BLD VENIPUNCTURE: CPT

## 2021-09-18 PROCEDURE — 94640 AIRWAY INHALATION TREATMENT: CPT

## 2021-09-18 PROCEDURE — 84484 ASSAY OF TROPONIN QUANT: CPT

## 2021-09-18 PROCEDURE — 82436 ASSAY OF URINE CHLORIDE: CPT

## 2021-09-18 PROCEDURE — 84133 ASSAY OF URINE POTASSIUM: CPT

## 2021-09-18 PROCEDURE — 84132 ASSAY OF SERUM POTASSIUM: CPT

## 2021-09-18 PROCEDURE — 80048 BASIC METABOLIC PNL TOTAL CA: CPT

## 2021-09-18 RX ORDER — DEXTROSE MONOHYDRATE 50 MG/ML
100 INJECTION, SOLUTION INTRAVENOUS PRN
Status: DISCONTINUED | OUTPATIENT
Start: 2021-09-18 | End: 2021-09-19 | Stop reason: SDUPTHER

## 2021-09-18 RX ORDER — DEXTROSE MONOHYDRATE 25 G/50ML
12.5 INJECTION, SOLUTION INTRAVENOUS PRN
Status: DISCONTINUED | OUTPATIENT
Start: 2021-09-18 | End: 2021-09-19 | Stop reason: SDUPTHER

## 2021-09-18 RX ORDER — ARFORMOTEROL TARTRATE 15 UG/2ML
15 SOLUTION RESPIRATORY (INHALATION) 2 TIMES DAILY
Status: DISCONTINUED | OUTPATIENT
Start: 2021-09-18 | End: 2021-09-24 | Stop reason: HOSPADM

## 2021-09-18 RX ORDER — DEXTROSE MONOHYDRATE 25 G/50ML
25 INJECTION, SOLUTION INTRAVENOUS ONCE
Status: COMPLETED | OUTPATIENT
Start: 2021-09-18 | End: 2021-09-18

## 2021-09-18 RX ORDER — NICOTINE POLACRILEX 4 MG
15 LOZENGE BUCCAL PRN
Status: DISCONTINUED | OUTPATIENT
Start: 2021-09-18 | End: 2021-09-24 | Stop reason: SDUPTHER

## 2021-09-18 RX ORDER — ACETAMINOPHEN 650 MG/1
650 SUPPOSITORY RECTAL EVERY 6 HOURS PRN
Status: DISCONTINUED | OUTPATIENT
Start: 2021-09-18 | End: 2021-09-24 | Stop reason: HOSPADM

## 2021-09-18 RX ORDER — METOPROLOL SUCCINATE 25 MG/1
25 TABLET, EXTENDED RELEASE ORAL DAILY
Status: DISCONTINUED | OUTPATIENT
Start: 2021-09-18 | End: 2021-09-24 | Stop reason: HOSPADM

## 2021-09-18 RX ORDER — ACETAMINOPHEN 325 MG/1
650 TABLET ORAL EVERY 6 HOURS PRN
Status: DISCONTINUED | OUTPATIENT
Start: 2021-09-18 | End: 2021-09-24 | Stop reason: HOSPADM

## 2021-09-18 RX ORDER — SODIUM CHLORIDE 9 MG/ML
25 INJECTION, SOLUTION INTRAVENOUS PRN
Status: DISCONTINUED | OUTPATIENT
Start: 2021-09-18 | End: 2021-09-24 | Stop reason: HOSPADM

## 2021-09-18 RX ORDER — INSULIN GLARGINE 100 [IU]/ML
25 INJECTION, SOLUTION SUBCUTANEOUS NIGHTLY
Status: DISCONTINUED | OUTPATIENT
Start: 2021-09-18 | End: 2021-09-24 | Stop reason: HOSPADM

## 2021-09-18 RX ORDER — BUDESONIDE 0.25 MG/2ML
250 INHALANT ORAL 2 TIMES DAILY
Status: DISCONTINUED | OUTPATIENT
Start: 2021-09-18 | End: 2021-09-24 | Stop reason: HOSPADM

## 2021-09-18 RX ORDER — ATORVASTATIN CALCIUM 40 MG/1
40 TABLET, FILM COATED ORAL DAILY
Status: DISCONTINUED | OUTPATIENT
Start: 2021-09-18 | End: 2021-09-24 | Stop reason: HOSPADM

## 2021-09-18 RX ORDER — SODIUM CHLORIDE 0.9 % (FLUSH) 0.9 %
10 SYRINGE (ML) INJECTION EVERY 12 HOURS SCHEDULED
Status: DISCONTINUED | OUTPATIENT
Start: 2021-09-18 | End: 2021-09-24 | Stop reason: HOSPADM

## 2021-09-18 RX ORDER — ONDANSETRON 2 MG/ML
4 INJECTION INTRAMUSCULAR; INTRAVENOUS EVERY 6 HOURS PRN
Status: DISCONTINUED | OUTPATIENT
Start: 2021-09-18 | End: 2021-09-24 | Stop reason: HOSPADM

## 2021-09-18 RX ORDER — SODIUM CHLORIDE 0.9 % (FLUSH) 0.9 %
10 SYRINGE (ML) INJECTION PRN
Status: DISCONTINUED | OUTPATIENT
Start: 2021-09-18 | End: 2021-09-24 | Stop reason: HOSPADM

## 2021-09-18 RX ORDER — FUROSEMIDE 10 MG/ML
80 INJECTION INTRAMUSCULAR; INTRAVENOUS ONCE
Status: COMPLETED | OUTPATIENT
Start: 2021-09-18 | End: 2021-09-18

## 2021-09-18 RX ORDER — POLYETHYLENE GLYCOL 3350 17 G/17G
17 POWDER, FOR SOLUTION ORAL DAILY PRN
Status: DISCONTINUED | OUTPATIENT
Start: 2021-09-18 | End: 2021-09-24 | Stop reason: HOSPADM

## 2021-09-18 RX ORDER — ONDANSETRON 4 MG/1
4 TABLET, ORALLY DISINTEGRATING ORAL EVERY 8 HOURS PRN
Status: DISCONTINUED | OUTPATIENT
Start: 2021-09-18 | End: 2021-09-24 | Stop reason: HOSPADM

## 2021-09-18 RX ORDER — FUROSEMIDE 10 MG/ML
40 INJECTION INTRAMUSCULAR; INTRAVENOUS 2 TIMES DAILY
Status: DISCONTINUED | OUTPATIENT
Start: 2021-09-18 | End: 2021-09-23

## 2021-09-18 RX ORDER — ASPIRIN 81 MG/1
81 TABLET ORAL DAILY
Status: DISCONTINUED | OUTPATIENT
Start: 2021-09-18 | End: 2021-09-24 | Stop reason: HOSPADM

## 2021-09-18 RX ORDER — IPRATROPIUM BROMIDE AND ALBUTEROL SULFATE 2.5; .5 MG/3ML; MG/3ML
1 SOLUTION RESPIRATORY (INHALATION)
Status: DISCONTINUED | OUTPATIENT
Start: 2021-09-18 | End: 2021-09-24 | Stop reason: HOSPADM

## 2021-09-18 RX ORDER — CALCIUM GLUCONATE 94 MG/ML
1000 INJECTION, SOLUTION INTRAVENOUS ONCE
Status: COMPLETED | OUTPATIENT
Start: 2021-09-18 | End: 2021-09-18

## 2021-09-18 RX ORDER — LEVOTHYROXINE SODIUM 112 UG/1
112 TABLET ORAL DAILY
Status: DISCONTINUED | OUTPATIENT
Start: 2021-09-18 | End: 2021-09-20

## 2021-09-18 RX ADMIN — BUDESONIDE 250 MCG: 0.25 SUSPENSION RESPIRATORY (INHALATION) at 07:54

## 2021-09-18 RX ADMIN — INSULIN GLARGINE 25 UNITS: 100 INJECTION, SOLUTION SUBCUTANEOUS at 22:11

## 2021-09-18 RX ADMIN — APIXABAN 5 MG: 5 TABLET, FILM COATED ORAL at 20:37

## 2021-09-18 RX ADMIN — INSULIN LISPRO 1 UNITS: 100 INJECTION, SOLUTION INTRAVENOUS; SUBCUTANEOUS at 13:39

## 2021-09-18 RX ADMIN — BUDESONIDE 250 MCG: 0.25 SUSPENSION RESPIRATORY (INHALATION) at 02:43

## 2021-09-18 RX ADMIN — ARFORMOTEROL TARTRATE 15 MCG: 15 SOLUTION RESPIRATORY (INHALATION) at 07:53

## 2021-09-18 RX ADMIN — INSULIN LISPRO 1 UNITS: 100 INJECTION, SOLUTION INTRAVENOUS; SUBCUTANEOUS at 08:07

## 2021-09-18 RX ADMIN — CALCIUM GLUCONATE 1000 MG: 98 INJECTION, SOLUTION INTRAVENOUS at 18:09

## 2021-09-18 RX ADMIN — ATORVASTATIN CALCIUM 40 MG: 40 TABLET, FILM COATED ORAL at 12:49

## 2021-09-18 RX ADMIN — ASPIRIN 81 MG: 81 TABLET ORAL at 12:48

## 2021-09-18 RX ADMIN — FUROSEMIDE 40 MG: 10 INJECTION, SOLUTION INTRAMUSCULAR; INTRAVENOUS at 18:28

## 2021-09-18 RX ADMIN — IPRATROPIUM BROMIDE AND ALBUTEROL SULFATE 1 AMPULE: .5; 2.5 SOLUTION RESPIRATORY (INHALATION) at 15:54

## 2021-09-18 RX ADMIN — FUROSEMIDE 80 MG: 10 INJECTION, SOLUTION INTRAMUSCULAR; INTRAVENOUS at 04:35

## 2021-09-18 RX ADMIN — METOPROLOL SUCCINATE 25 MG: 25 TABLET, EXTENDED RELEASE ORAL at 08:07

## 2021-09-18 RX ADMIN — INSULIN LISPRO 1 UNITS: 100 INJECTION, SOLUTION INTRAVENOUS; SUBCUTANEOUS at 22:11

## 2021-09-18 RX ADMIN — IPRATROPIUM BROMIDE AND ALBUTEROL SULFATE 1 AMPULE: .5; 2.5 SOLUTION RESPIRATORY (INHALATION) at 20:49

## 2021-09-18 RX ADMIN — APIXABAN 5 MG: 5 TABLET, FILM COATED ORAL at 04:35

## 2021-09-18 RX ADMIN — BUDESONIDE 250 MCG: 0.25 SUSPENSION RESPIRATORY (INHALATION) at 20:49

## 2021-09-18 RX ADMIN — Medication 10 ML: at 20:39

## 2021-09-18 RX ADMIN — IPRATROPIUM BROMIDE AND ALBUTEROL SULFATE 1 AMPULE: .5; 2.5 SOLUTION RESPIRATORY (INHALATION) at 11:31

## 2021-09-18 RX ADMIN — IPRATROPIUM BROMIDE AND ALBUTEROL SULFATE 1 AMPULE: .5; 2.5 SOLUTION RESPIRATORY (INHALATION) at 07:53

## 2021-09-18 RX ADMIN — ARFORMOTEROL TARTRATE 15 MCG: 15 SOLUTION RESPIRATORY (INHALATION) at 20:49

## 2021-09-18 RX ADMIN — INSULIN HUMAN 10 UNITS: 100 INJECTION, SOLUTION PARENTERAL at 18:11

## 2021-09-18 RX ADMIN — DEXTROSE MONOHYDRATE 25 G: 25 INJECTION, SOLUTION INTRAVENOUS at 18:09

## 2021-09-18 ASSESSMENT — ENCOUNTER SYMPTOMS
DIARRHEA: 0
BACK PAIN: 0
DIARRHEA: 1
CHEST TIGHTNESS: 0
NAUSEA: 0
RHINORRHEA: 0
ABDOMINAL DISTENTION: 0
ABDOMINAL DISTENTION: 1
WHEEZING: 0
CONSTIPATION: 0
ABDOMINAL PAIN: 0
COUGH: 1
PHOTOPHOBIA: 0
VOMITING: 0
SHORTNESS OF BREATH: 1

## 2021-09-18 NOTE — H&P
[facility]  Internal Medicine Residency Program  History and Physical    Patient:  Brittanie Oates 74 y.o. male MRN: [id number]     Date of Service: 9/18/2021    Hospital Day: 2      Chief complaint: had concerns including Shortness of Breath (pt reports increased sob. pt was seen by pcp on monday and given water pills and reports they arent working. ). History of Present Illness   The patient is a 74 y.o. male with a past medical history of HFpEF, A. fib type 2 diabetes mellitus, hypertension, hyperlipidemia, hypothyroidism, restrictive lung disease and obstructive sleep apnea. Presented to the ED on 9/17 with concerns of increased shortness of breath and continued weight gain, Not improving since last PCP visit on 9/13. Patient was seen on 9/13 in the IM clinic, at that time his medication was adjusted, mainly Lasix was increased from 40 mg once daily to 40 mg twice daily. According to the patient he was having increased swelling in both his legs and abdomen for the past several weeks, he also noticed increased weight gain of at least 20 pounds it has occurred over the last month. He states that his shortness of breath has worsened since noticing the increased swelling, he states that he was once able to walk up a flight of stairs without becoming short of breath however in the past month or so, he is unable to maintain his previous level of physical activity. Patient is now requiring 4 L of home O2. Patient states that his cough has been constant over the past month, he states that it is not productive of much sputum, however he said about once a week or so he will cough up some greenish tinged sputum. When seen in the ED the patient was found to be short of breath and was started on 4 L of O2. A chest x-ray was obtained showing pleural effusions, the patient was given 1 dose of ceftriaxone and doxycycline, lactic acid was obtained and was normal at 0.9.   Patient was also given 1 medications    Medication Sig Start Date End Date Taking? Authorizing Provider   furosemide (LASIX) 40 MG tablet Take 40 mg by mouth daily   Yes Historical Provider, MD   naproxen sodium (ANAPROX) 220 MG tablet Take 220 mg by mouth 2 times daily as needed for Pain   Yes Historical Provider, MD   Misc. Devices MISC Please add portability to current O2 order. Resp to evaluate patient for OCD device. 9/15/21  Yes Kt Galicia MD   potassium chloride (KLOR-CON M) 20 MEQ extended release tablet Take 2 tablets by mouth daily 9/13/21 10/13/21 Yes Joanne Romero MD   hydroCHLOROthiazide (HYDRODIURIL) 25 MG tablet Take 1 tablet by mouth daily 9/13/21  Yes Joanne Romero MD   levothyroxine (SYNTHROID) 112 MCG tablet TAKE ONE TABLET BY MOUTH EVERY DAY 9/13/21  Yes Joanne Romero MD   lisinopril (PRINIVIL;ZESTRIL) 40 MG tablet Take 1 tablet by mouth daily 9/13/21  Yes Joanne Romero MD   apixaban (ELIQUIS) 5 MG TABS tablet Take 1 tablet by mouth 2 times daily 8/30/21 9/29/21 Yes Joanne Romero MD   atorvastatin (LIPITOR) 40 MG tablet TAKE ONE TABLET BY MOUTH DAILY 8/6/21  Yes Vinicio Russell., DO   metoprolol succinate (TOPROL XL) 25 MG extended release tablet TAKE ONE TABLET BY MOUTH DAILY 8/3/21  Yes JACQUELINE Dela Cruz - BOB   insulin glargine [facility]) 100 UNIT/ML injection pen Inject 35 Units into the skin every morning PAP medication 7/6/21  Yes Ellen Ferraro MD   metFORMIN (GLUCOPHAGE) 1000 MG tablet TAKE ONE TABLET BY MOUTH TWICE A DAY WITH MEALS 6/14/21  Yes Carissa Rodgers MD   blood glucose test strips (ONETOUCH VERIO) strip TEST IN THE MORNING AND IN THE EVENING 2/23/21  Yes Brenda Wise MD   [facility]. Devices KIT Dispense 1 blood pressure cuff. 2/23/21  Yes Brenda Wise MD   Lancets MISC Give Delica lancets. Tests twice a day A.M.  And P.M 11/10/20  Yes Mario Rose MD   Insulin Pen Needle (PEN NEEDLES) 32G X 6 MM MISC 2 times a day, in the morning and before bed 7/28/20  Yes Rebekah Fabry Anthony Carlson MD   OXYGEN Inhale 2.5 L into the lungs nightly 7/28/20  Yes Karuna Ibrahim MD   Cholecalciferol (VITAMIN D3) 1000 units TABS TAKE TWO TABLETS BY MOUTH EVERY DAY 7/19/19  Yes Karuna Ibrahim MD   zoster recombinant adjuvanted vaccine [facility]) 50 MCG/0.5ML SUSR injection 1 dose now and repeat in 2-6 months 7/19/19  Yes Karuna Ibrahim MD   aspirin (ASPIR-LOW) 81 MG EC tablet TAKE ONE TABLET BY MOUTH EVERY DAY 5/3/19  Yes Warden Honey MD   mometasone-formoterol (DULERA) 200-5 MCG/ACT inhaler Inhale 2 puffs into the lungs 2 times daily Rinse mouth after using. PAP medication. 4/1/19  Yes Karuna Ibrahim MD   acetaminophen (TYLENOL) 500 MG tablet Take 1,000 mg by mouth daily as needed for Pain. Yes Historical Provider, MD       Allergies:  Patient has no known allergies. Social History:   TOBACCO:   reports that he quit smoking about 16 years ago. His smoking use included cigarettes. He has a 3.50 pack-year smoking history. He has quit using smokeless tobacco.  ETOH:   reports no history of alcohol use. OCCUPATION:      Family History:       Problem Relation Age of Onset    Cancer Mother     Heart Disease Mother     High Blood Pressure Father     Cancer Brother     High Blood Pressure Brother     Diabetes Brother        REVIEW OF SYSTEMS:    Review of Systems   Constitutional: Positive for fatigue. Negative for chills, diaphoresis and fever. HENT: Negative for congestion and rhinorrhea. Eyes: Negative for visual disturbance. Respiratory: Positive for cough and shortness of breath. Negative for wheezing. Cardiovascular: Positive for leg swelling. Negative for chest pain and palpitations. Gastrointestinal: Positive for abdominal distention and diarrhea. Negative for abdominal pain, constipation, nausea and vomiting. Endocrine: Negative for polydipsia, polyphagia and polyuria. Genitourinary: Negative for difficulty urinating, dysuria, enuresis, hematuria, penile swelling and scrotal swelling. Musculoskeletal: Negative for arthralgias and back pain. Neurological: Negative for dizziness, light-headedness and headaches. Physical Exam   · Vitals: BP (!) 145/79   Pulse 88   Resp 18   SpO2 (!) 87%     Physical Exam  Constitutional:       General: He is awake. HENT:      Head: Normocephalic and atraumatic. Mouth/Throat:      Tongue: No lesions (geographic tongue). Eyes:      General: Lids are normal.      Conjunctiva/sclera: Conjunctivae normal.      Pupils: Pupils are equal, round, and reactive to light. Cardiovascular:      Rate and Rhythm: Normal rate and regular rhythm. Pulses: Normal pulses. Heart sounds: S2 normal. Heart sounds are distant. No murmur heard. No friction rub. No gallop. Pulmonary:      Effort: Pulmonary effort is normal.      Breath sounds: Examination of the right-upper field reveals rales. Examination of the left-upper field reveals rales. Examination of the right-middle field reveals rales. Examination of the left-middle field reveals rales. Examination of the right-lower field reveals rales. Examination of the left-lower field reveals rales. Decreased breath sounds and rales present. No wheezing. Abdominal:      General: Abdomen is protuberant. There is distension. Palpations: Abdomen is rigid. There is shifting dullness and fluid wave. Tenderness: There is abdominal tenderness in the right lower quadrant. Comments: Abdomen is grossly distended and is obviously fluid overloaded    Musculoskeletal:      Cervical back: Full passive range of motion without pain, normal range of motion and neck supple. Right lower leg: Pitting Edema present. Left lower le+ Pitting Edema present. Skin:     General: Skin is warm and moist.   Neurological:      Mental Status: He is alert. Psychiatric:         Behavior: Behavior is cooperative.        Labs and Imaging Studies   Basic Labs  Recent Labs     21  1627      K 6.3*    CO2 27   BUN 33*   CREATININE 1.4*   GLUCOSE 109*   CALCIUM 8.7       Recent Labs     09/17/21  1627   WBC 8.5   RBC 3.67*   HGB 11.1*   HCT 36.6*   MCV 99.7   MCH 30.2   MCHC 30.3*   RDW 15.3*      MPV 9.5         Imaging Studies:  XR CHEST (2 VW)   Final Result   Left-sided pleural effusion. Cardiomegaly. Discrete patchy infiltrate in the left base questionable on the right. Cannot exclude bilateral pneumonia. Please correlate clinically. CT CHEST WO CONTRAST    (Results Pending)        EKG: normal sinus rhythm, unchanged from previous tracings.     Resident's Assessment and Plan     Assessment and Plan:  Cardiology  HFpEF exacerbation  -Patient is severely volume overloaded, has anasarca with grossly distended and volume overloaded abdomen and 4+ pitting edema to knee  -Patient's last echo in October 2020 showed EF of 60 to 65%  -On admission to ED patient's proBNP was 1596  -Patient was previously on Lasix 40 mg once daily, however after meeting with his PCP on Monday 9/13 he was increased to 40 mg twice daily, did not see improvement in his edema or shortness of breath at that time  -Troponin admission was 41  -Patient was given 1 dose of Lasix 40 mg IV in the ED  -Dose of Lasix 80 mg IV and will monitor output  -We will place [place] for patient to monitor urine output  -Strict I's and O's  -Continue home metoprolol 25 mg  -We will obtain echo to evaluate current heart function  -Patient would be candidate for ischemic work-up following resolution of acute issues, we will trend troponin however patient will most likely need repeat EKG and possible stress test going forward due to progressive worsening of heart failure  -Continue to monitor patient and urine output    History of hypertension  -Patient is on home lisinopril and hydrochlorothiazide  -We will hold home meds at this time and reassess in the morning    History of hyperlipidemia  -Continue home atorvastatin 40 PT/OT evaluation: not indicated at this time  DVT prophylaxis/ GI prophylaxis: Eliquis/ Diet   Disposition: admit to monitored floor     Aaron Mac MD, PGY-1  Attending physician: Dr. Samaria Ni      Senior Resident Addendum:  I have seen and examined the patient with the intern. I have discussed the case, including pertinent history and exam findings with the intern. I agree with the assessment, plan and orders as documented above with the following additions:    Assessment:    1. Acute decompensated HFpEF (EF: 60-65%, 10/2020), anasarca  2. Chronic hypoxic respiratory failure 2/2 obesity related restrictive lung disease + acute decompensated HF currently, on 4L home O2  3. Left sided pleural effusion, likely 2/2 HFpEF exacerbation  4. Persistent atrial fibrillation, on Toprol and eliquis  5. Sinus node dysfunction s/p dual chamber pacemaker  6. Elevated troponin  7. Essential HTN   8. Acquired hypothyroidism on Synthroid  9. Insulin dependent type 2 DM  10. NIKO, on CPAP, non-compliant  11. Obesity, BMI: 38    Plan:  · F/u CMP in the AM  · Getting 80mg lasix IV (was taking 40mg BID), titrate IV lasix to effect. based on response, consider bumex  · F/u repeat echocardiogram (change in EF?)  · Consider doing ischemic work-up at some point when well diuresed (regadenoson stress?)  · Trend troponin, F/U CK, CK-MB  · CPAP at night, 4L O2 at baseline  · F/u procalcitonin, CRP, ESR, evaluate for CAP? Currently does not appear so  · F/u BG AC/HS, Lantus 25U resumed with cardiac/diabetic restricted diet+ LD sliding scale.   · Awaiting CT chest w/o contrast, once completed, can decide whether the pleural effusion is big enough for thoracentesis  · Duoneb, brovana and pulmicort (in place of Tarah Andrew)       Sydnie Blakely MD PGY-2  9/18/2021  2:03 AM

## 2021-09-18 NOTE — ED NOTES
[facility] faxed for room 6446 for admissior, Floor aware. Pt to be transported, NAD noted at this time. VSS.       Rahul Ortiz RN  09/18/21 [id number]

## 2021-09-18 NOTE — ED PROVIDER NOTES
Wendi Singh is an 74year old male with PMH of CHF, DM, HTN, hypothyroidism who presented to ED with concern for shortness of breath. Patient has had several days of shortness of breath. Patient went to his PCP on Monday and had his diuretic increase. Patient has also been taking potassium replacement at home. Patient came in today for worsening shortness of breath with worsening orthopnea. Patient does have worsening lower extremity edema. Patient has tried outpatient diuretics without improvement of symptoms. Patient is typically on 4 L nasal cannula at his baseline at home. Patient denies fever, chills, nausea, vomiting. Patient denies any chest pain. Patient does have a nonproductive cough. Patient denies any known sick contacts and patient is vaccinated for Covid. Patient has tried diuretics without improvement of symptoms symptoms are worse with lying flat and ambulation. Symptoms are moderate in severity and constant and present for about 5 days and worsening. The history is provided by the patient, medical records and a relative. Review of Systems   Constitutional: Positive for fatigue. Negative for chills, diaphoresis and fever. Eyes: Negative for photophobia and visual disturbance. Respiratory: Positive for cough and shortness of breath. Negative for chest tightness. Cardiovascular: Negative for chest pain, palpitations and leg swelling. Gastrointestinal: Negative for abdominal distention, abdominal pain, diarrhea, nausea and vomiting. Genitourinary: Negative for dysuria. Musculoskeletal: Negative for back pain, neck pain and neck stiffness. Skin: Negative for pallor and rash. Neurological: Negative for headaches. Psychiatric/Behavioral: Negative for confusion. Physical Exam  Vitals and nursing note reviewed. Constitutional:       General: He is in acute distress. Appearance: Normal appearance. He is ill-appearing.    HENT:      Head: Normocephalic and atraumatic. Eyes:      General: No scleral icterus. Conjunctiva/sclera: Conjunctivae normal.      Pupils: Pupils are equal, round, and reactive to light. Cardiovascular:      Rate and Rhythm: Normal rate and regular rhythm. Pulmonary:      Effort: Tachypnea present. Abdominal:      General: Bowel sounds are normal. There is distension. Palpations: Abdomen is soft. Tenderness: There is no abdominal tenderness. There is no guarding or rebound. Musculoskeletal:      Cervical back: Normal range of motion and neck supple. No rigidity or tenderness. No muscular tenderness. Right lower leg: Edema present. Left lower leg: Edema present. Comments: 3+ LE edema extending up past knee   Skin:     General: Skin is warm and dry. Capillary Refill: Capillary refill takes less than 2 seconds. Coloration: Skin is not pale. Findings: No erythema or rash. Neurological:      Mental Status: He is alert and oriented to person, place, and time. Psychiatric:         Mood and Affect: Mood normal.          Procedures     MDM  Number of Diagnoses or Management Options  Diagnosis management comments: Wendi Singh is a 74year old male who presented to ED with concern for shortness of breath and LE edema. Patient's symptoms likely secondary to pleural effusion and CHF exacerbation. Possible with patient's cough and uri symptoms patient's symptoms also exacerbated by pna. Patient was started on rocephin and doxycycline in ED. Patient was found to have hyperkalemia likely secondary to medication. Patient had stable renal function. Patient's hyperkalemia was treated with calcium, insulin, bicarb, and lasix   Patient was stable on his home [place] O2 however, patient was offered biPAP due to increased work of breathing but declined. Respiratory panel was negative. Patient will be admitted for further evaluation.         Amount and/or Complexity of Data Reviewed  Clinical lab tests: reviewed  Tests in the radiology section of CPT®: reviewed  Tests in the medicine section of CPT®: reviewed                  --------------------------------------------- PAST HISTORY ---------------------------------------------  Past Medical History:  has a past medical history of Atrial fibrillation ([facility] Carpal tunnel syndrome, Hyperlipidemia, Hypertension, Hypothyroidism, Lung nodule, Nocturnal hypoxemia due to obesity, Obesity, NIKO (obstructive sleep apnea), Osteoarthritis, Pinched nerve, Restrictive lung disease secondary to obesity, Sinus node dysfunction ([facility] and Type II or unspecified type diabetes mellitus without mention of complication, not stated as uncontrolled. Past Surgical History:  has a past surgical history that includes Colonoscopy (2007); back surgery (2012); eye surgery; hernia repair; Colonoscopy (06/04/12); back surgery (05/30/2017); pacemaker placement (10/03/2017); and Rotator cuff repair (Right). Social History:  reports that he quit smoking about 16 years ago. His smoking use included cigarettes. He has a 3.50 pack-year smoking history. He has quit using smokeless tobacco. He reports that he does not drink alcohol and does not use drugs. Family History: family history includes Cancer in his brother and mother; Diabetes in his brother; Heart Disease in his mother; High Blood Pressure in his brother and father. The patients home medications have been reviewed. Allergies: Patient has no known allergies.     -------------------------------------------------- RESULTS -------------------------------------------------    LABS:  Results for orders placed or performed during the hospital encounter of 09/17/21   COVID-19, Rapid    Specimen: Nasopharyngeal Swab   Result Value Ref Range    SARS-CoV-2, NAAT Not Detected Not Detected   CBC Auto Differential   Result Value Ref Range    WBC 8.5 4.5 - 11.5 E9/L    RBC 3.67 (L) 3.80 - 5.80 E12/L    Hemoglobin 11.1 (L) 12.5 - 16.5 g/dL Hematocrit 36.6 (L) 37.0 - 54.0 %    MCV 99.7 80.0 - 99.9 fL    MCH 30.2 26.0 - 35.0 pg    MCHC 30.3 (L) 32.0 - 34.5 %    RDW 15.3 (H) 11.5 - 15.0 fL    Platelets 581 563 - 799 E9/L    MPV 9.5 7.0 - 12.0 fL    Neutrophils % 66.2 43.0 - 80.0 %    Immature Granulocytes % 2.0 0.0 - 5.0 %    Lymphocytes % 16.3 (L) 20.0 - 42.0 %    Monocytes % 9.8 2.0 - 12.0 %    Eosinophils % 4.6 0.0 - 6.0 %    Basophils % 1.1 0.0 - 2.0 %    Neutrophils Absolute 5.63 1.80 - 7.30 E9/L    Immature Granulocytes # 0.17 E9/L    Lymphocytes Absolute 1.38 (L) 1.50 - 4.00 E9/L    Monocytes Absolute 0.83 0.10 - 0.95 E9/L    Eosinophils Absolute 0.39 0.05 - 0.50 E9/L    Basophils Absolute 0.09 0.00 - 0.20 E9/L   Comprehensive Metabolic Panel w/ Reflex to MG   Result Value Ref Range    Sodium 138 132 - 146 mmol/L    Potassium reflex Magnesium 6.3 (H) 3.5 - 5.0 mmol/L    Chloride 106 98 - 107 mmol/L    CO2 27 22 - 29 mmol/L    Anion Gap 5 (L) 7 - 16 mmol/L    Glucose 109 (H) 74 - 99 mg/dL    BUN 33 (H) 6 - 23 mg/dL    CREATININE 1.4 (H) 0.7 - 1.2 mg/dL    GFR Non-African American 50 >=60 mL/min/1.73    GFR African American >60     Calcium 8.7 8.6 - 10.2 mg/dL    Total Protein 7.4 6.4 - 8.3 g/dL    Albumin 4.1 3.5 - 5.2 g/dL    Total Bilirubin 0.6 0.0 - 1.2 mg/dL    Alkaline Phosphatase 92 40 - 129 U/L    ALT 22 0 - 40 U/L    AST 20 0 - 39 U/L   Troponin   Result Value Ref Range    Troponin, High Sensitivity 41 (H) 0 - 11 ng/L   Brain Natriuretic Peptide   Result Value Ref Range    Pro-BNP 1,596 (H) 0 - 125 pg/mL   Urinalysis   Result Value Ref Range    Color, UA Yellow Straw/Yellow    Clarity, UA Clear Clear    Glucose, Ur Negative Negative mg/dL    Bilirubin Urine Negative Negative    Ketones, Urine Negative Negative mg/dL    Specific Gravity, UA 1.025 1.005 - 1.030    Blood, Urine Negative Negative    pH, UA 5.0 5.0 - 9.0    Protein, UA Negative Negative mg/dL    Urobilinogen, Urine 0.2 <2.0 E.U./dL    Nitrite, Urine Negative Negative Leukocyte Esterase, Urine Negative Negative   Lactate, Sepsis   Result Value Ref Range    Lactic Acid, Sepsis 0.9 0.5 - 1.9 mmol/L   Lactate, Sepsis   Result Value Ref Range    Lactic Acid, Sepsis 0.9 0.5 - 1.9 mmol/L   Basic Metabolic Panel w/ Reflex to MG   Result Value Ref Range    Sodium 136 132 - 146 mmol/L    Potassium reflex Magnesium 6.1 (H) 3.5 - 5.0 mmol/L    Chloride 106 98 - 107 mmol/L    CO2 23 22 - 29 mmol/L    Anion Gap 7 7 - 16 mmol/L    Glucose 148 (H) 74 - 99 mg/dL    BUN 31 (H) 6 - 23 mg/dL    CREATININE 1.2 0.7 - 1.2 mg/dL    GFR Non-African American >60 >=60 mL/min/1.73    GFR African American >60     Calcium 8.4 (L) 8.6 - 10.2 mg/dL   Basic Metabolic Panel w/ Reflex to MG   Result Value Ref Range    Sodium 139 132 - 146 mmol/L    Potassium reflex Magnesium 5.3 (H) 3.5 - 5.0 mmol/L    Chloride 109 (H) 98 - 107 mmol/L    CO2 21 (L) 22 - 29 mmol/L    Anion Gap 9 7 - 16 mmol/L    Glucose 156 (H) 74 - 99 mg/dL    BUN 29 (H) 6 - 23 mg/dL    CREATININE 1.2 0.7 - 1.2 mg/dL    GFR Non-African American >60 >=60 mL/min/1.73    GFR African American >60     Calcium 7.8 (L) 8.6 - 10.2 mg/dL   Comprehensive Metabolic Panel w/ Reflex to MG   Result Value Ref Range    Sodium 137 132 - 146 mmol/L    Potassium reflex Magnesium 6.1 (H) 3.5 - 5.0 mmol/L    Chloride 105 98 - 107 mmol/L    CO2 26 22 - 29 mmol/L    Anion Gap 6 (L) 7 - 16 mmol/L    Glucose 174 (H) 74 - 99 mg/dL    BUN 32 (H) 6 - 23 mg/dL    CREATININE 1.4 (H) 0.7 - 1.2 mg/dL    GFR Non-African American 50 >=60 mL/min/1.73    GFR African American >60     Calcium 8.6 8.6 - 10.2 mg/dL    Total Protein 6.9 6.4 - 8.3 g/dL    Albumin 3.9 3.5 - 5.2 g/dL    Total Bilirubin 0.5 0.0 - 1.2 mg/dL    Alkaline Phosphatase 85 40 - 129 U/L    ALT 20 0 - 40 U/L    AST 18 0 - 39 U/L   CBC auto differential   Result Value Ref Range    WBC 8.6 4.5 - 11.5 E9/L    RBC 3.39 (L) 3.80 - 5.80 E12/L    Hemoglobin 10.3 (L) 12.5 - 16.5 g/dL    Hematocrit 33.8 (L) 37.0 - 54.0 % MCV 99.7 80.0 - 99.9 fL    MCH 30.4 26.0 - 35.0 pg    MCHC 30.5 (L) 32.0 - 34.5 %    RDW 15.2 (H) 11.5 - 15.0 fL    Platelets 503 827 - 055 E9/L    MPV 9.7 7.0 - 12.0 fL    Neutrophils % 70.8 43.0 - 80.0 %    Immature Granulocytes % 1.5 0.0 - 5.0 %    Lymphocytes % 14.0 (L) 20.0 - 42.0 %    Monocytes % 8.8 2.0 - 12.0 %    Eosinophils % 4.1 0.0 - 6.0 %    Basophils % 0.8 0.0 - 2.0 %    Neutrophils Absolute 6.05 1.80 - 7.30 E9/L    Immature Granulocytes # 0.13 E9/L    Lymphocytes Absolute 1.20 (L) 1.50 - 4.00 E9/L    Monocytes Absolute 0.75 0.10 - 0.95 E9/L    Eosinophils Absolute 0.35 0.05 - 0.50 E9/L    Basophils Absolute 0.07 0.00 - 0.20 E9/L   Troponin   Result Value Ref Range    Troponin, High Sensitivity 43 (H) 0 - 11 ng/L   Magnesium   Result Value Ref Range    Magnesium 1.6 1.6 - 2.6 mg/dL   CK   Result Value Ref Range    Total CK 61 20 - 200 U/L   CK-MB   Result Value Ref Range    CK-MB 2.7 0.0 - 7.7 ng/mL   Procalcitonin   Result Value Ref Range    Procalcitonin 0.11 (H) 0.00 - 0.08 ng/mL   C-reactive protein   Result Value Ref Range    CRP 0.3 0.0 - 0.4 mg/dL   Sedimentation Rate   Result Value Ref Range    Sed Rate 31 (H) 0 - 15 mm/Hr   URINE ELECTROLYTES   Result Value Ref Range    Sodium, Ur 122 Not Established mmol/L    Potassium, Ur 16.8 Not Established mmol/L    Chloride 134 Not Established mmol/L   UREA NITROGEN, URINE   Result Value Ref Range    Urea Nitrogen, Ur 144 (L) 800 - 1666 mg/dL   POCT Glucose   Result Value Ref Range    Glucose 184 mg/dL   POCT Glucose   Result Value Ref Range    Meter Glucose 224 (H) 74 - 99 mg/dL   POCT Glucose   Result Value Ref Range    Meter Glucose 188 (H) 74 - 99 mg/dL   POCT Glucose   Result Value Ref Range    Meter Glucose 143 (H) 74 - 99 mg/dL   POCT Glucose   Result Value Ref Range    Meter Glucose 196 (H) 74 - 99 mg/dL   EKG 12 Lead   Result Value Ref Range    Ventricular Rate 76 BPM    Atrial Rate 76 BPM    QRS Duration 152 ms    Q-T Interval 420 ms    QTc Calculation (Judy) 472 ms    R Axis -133 degrees    T Axis 39 degrees       RADIOLOGY:  CT CHEST WO CONTRAST   Final Result   Intervally new small to moderate left and trace right pleural effusions. Mild thickening of secondary interlobular septa with ground-glass attenuation   at some levels, compatible with an element of interstitial pulmonary edema. Patchy mild subsegmental atelectasis bilaterally, worse within the basal left   lower lobe and inferior lingula. Multiple essentially stable pulmonary nodules bilaterally. Intervally new small volume ascites and flank edema. Mild upper abdominal lymphadenopathy. RECOMMENDATIONS:   Multiple pulmonary nodules. Most severe: 5 mm solid pulmonary nodule. No   routine follow-up imaging is recommended. These guidelines do not apply to immunocompromised patients and patients with   cancer. Follow up in patients with significant comorbidities as clinically   warranted. For lung cancer screening, adhere to Lung-RADS guidelines. Reference: Radiology. 2017; 284(1):228-43. XR CHEST (2 VW)   Final Result   Left-sided pleural effusion. Cardiomegaly. Discrete patchy infiltrate in the left base questionable on the right. Cannot exclude bilateral pneumonia. Please correlate clinically. EKG:  This EKG is signed and interpreted by me. Rate: 68  Rhythm: dual paced  Interpretation: non-specific EKG, no ST elevations or depressions  Comparison: changes compared to previous EKG      ------------------------- NURSING NOTES AND VITALS REVIEWED ---------------------------  Date / Time Roomed:  9/17/2021  7:54 PM  ED Bed Assignment:  [id number]    The nursing notes within the ED encounter and vital signs as below have been reviewed.      Patient Vitals for the past 24 hrs:   BP Pulse Resp SpO2   09/18/21 1245 121/73 66 20 97 %   09/18/21 1215 -- -- -- 99 %   09/18/21 1131 -- -- 20 100 %   09/18/21 0802 133/63 74 18 99 % 09/18/21 0755 -- -- 20 99 %   09/18/21 0427 (!) 151/79 77 20 96 %   09/18/21 0243 -- -- 16 98 %   09/18/21 0219 (!) 148/89 68 20 96 %   09/18/21 0113 (!) 155/90 80 20 95 %   09/17/21 1612 (!) 145/79 -- 18 (!) 87 %   09/17/21 1558 -- 88 -- 92 %       Oxygen Saturation Interpretation: Normal    ------------------------------------------ PROGRESS NOTES ------------------------------------------  Re-evaluation(s):  Time: 11pm  Patients symptoms show no change  Repeat physical examination is not changed    Counseling:  I have spoken with the patient and discussed todays results, in addition to providing specific details for the plan of care and counseling regarding the diagnosis and prognosis. Their questions are answered at this time and they are agreeable with the plan of admission.    --------------------------------- ADDITIONAL PROVIDER NOTES ---------------------------------  Consultations:   Spoke with Dr. Shaw Gasca. Discussed case. They will admit the patient. This patient's ED course included: a personal history and physicial examination, re-evaluation prior to disposition, multiple bedside re-evaluations, IV medications, cardiac monitoring, continuous pulse oximetry and complex medical decision making and emergency management    This patient has remained hemodynamically stable during their ED course. Please note that the withdrawal or failure to initiate urgent interventions for this patient would likely result in a life threatening deterioration or permanent disability. Accordingly this patient received 30 minutes of critical care time, excluding separately billable procedures. Diagnosis:  1. Congestive heart failure, unspecified HF chronicity, unspecified heart failure type ([facility]    2. Pneumonia due to infectious organism, unspecified laterality, unspecified part of lung    3. Hyperkalemia        Disposition:  Patient's disposition: Admit to telemetry  Patient's condition is stable. Andrea Gabriel MD  Resident  09/18/21 2436

## 2021-09-19 LAB
ANION GAP SERPL CALCULATED.3IONS-SCNC: 4 MMOL/L (ref 7–16)
BASOPHILS ABSOLUTE: 0.07 E9/L (ref 0–0.2)
BASOPHILS RELATIVE PERCENT: 0.9 % (ref 0–2)
BUN BLDV-MCNC: 30 MG/DL (ref 6–23)
CALCIUM SERPL-MCNC: 9.4 MG/DL (ref 8.6–10.2)
CHLORIDE BLD-SCNC: 102 MMOL/L (ref 98–107)
CO2: 31 MMOL/L (ref 22–29)
CREAT SERPL-MCNC: 1.3 MG/DL (ref 0.7–1.2)
EOSINOPHILS ABSOLUTE: 0.33 E9/L (ref 0.05–0.5)
EOSINOPHILS RELATIVE PERCENT: 4.3 % (ref 0–6)
GFR AFRICAN AMERICAN: >60
GFR NON-AFRICAN AMERICAN: 55 ML/MIN/1.73
GLUCOSE BLD-MCNC: 150 MG/DL (ref 74–99)
HCT VFR BLD CALC: 32 % (ref 37–54)
HEMOGLOBIN: 10 G/DL (ref 12.5–16.5)
IMMATURE GRANULOCYTES #: 0.1 E9/L
IMMATURE GRANULOCYTES %: 1.3 % (ref 0–5)
LYMPHOCYTES ABSOLUTE: 1.06 E9/L (ref 1.5–4)
LYMPHOCYTES RELATIVE PERCENT: 13.9 % (ref 20–42)
MAGNESIUM: 1.3 MG/DL (ref 1.6–2.6)
MCH RBC QN AUTO: 30.6 PG (ref 26–35)
MCHC RBC AUTO-ENTMCNC: 31.3 % (ref 32–34.5)
MCV RBC AUTO: 97.9 FL (ref 80–99.9)
METER GLUCOSE: 130 MG/DL (ref 74–99)
METER GLUCOSE: 130 MG/DL (ref 74–99)
METER GLUCOSE: 146 MG/DL (ref 74–99)
METER GLUCOSE: 156 MG/DL (ref 74–99)
METER GLUCOSE: 160 MG/DL (ref 74–99)
METER GLUCOSE: 167 MG/DL (ref 74–99)
MONOCYTES ABSOLUTE: 0.89 E9/L (ref 0.1–0.95)
MONOCYTES RELATIVE PERCENT: 11.7 % (ref 2–12)
NEUTROPHILS ABSOLUTE: 5.17 E9/L (ref 1.8–7.3)
NEUTROPHILS RELATIVE PERCENT: 67.9 % (ref 43–80)
PDW BLD-RTO: 14.8 FL (ref 11.5–15)
PLATELET # BLD: 222 E9/L (ref 130–450)
PMV BLD AUTO: 9.9 FL (ref 7–12)
POTASSIUM SERPL-SCNC: 5.3 MMOL/L (ref 3.5–5)
POTASSIUM SERPL-SCNC: 5.5 MMOL/L (ref 3.5–5)
POTASSIUM SERPL-SCNC: 5.7 MMOL/L (ref 3.5–5)
PRO-BNP: 1600 PG/ML (ref 0–125)
RBC # BLD: 3.27 E12/L (ref 3.8–5.8)
SODIUM BLD-SCNC: 137 MMOL/L (ref 132–146)
WBC # BLD: 7.6 E9/L (ref 4.5–11.5)

## 2021-09-19 PROCEDURE — 94660 CPAP INITIATION&MGMT: CPT

## 2021-09-19 PROCEDURE — 6360000002 HC RX W HCPCS: Performed by: INTERNAL MEDICINE

## 2021-09-19 PROCEDURE — 82962 GLUCOSE BLOOD TEST: CPT

## 2021-09-19 PROCEDURE — 80048 BASIC METABOLIC PNL TOTAL CA: CPT

## 2021-09-19 PROCEDURE — 83735 ASSAY OF MAGNESIUM: CPT

## 2021-09-19 PROCEDURE — 93005 ELECTROCARDIOGRAM TRACING: CPT | Performed by: INTERNAL MEDICINE

## 2021-09-19 PROCEDURE — 2500000003 HC RX 250 WO HCPCS: Performed by: INTERNAL MEDICINE

## 2021-09-19 PROCEDURE — 6370000000 HC RX 637 (ALT 250 FOR IP): Performed by: INTERNAL MEDICINE

## 2021-09-19 PROCEDURE — 36415 COLL VENOUS BLD VENIPUNCTURE: CPT

## 2021-09-19 PROCEDURE — 94640 AIRWAY INHALATION TREATMENT: CPT

## 2021-09-19 PROCEDURE — 85025 COMPLETE CBC W/AUTO DIFF WBC: CPT

## 2021-09-19 PROCEDURE — 2060000000 HC ICU INTERMEDIATE R&B

## 2021-09-19 PROCEDURE — 2580000003 HC RX 258: Performed by: INTERNAL MEDICINE

## 2021-09-19 PROCEDURE — 2700000000 HC OXYGEN THERAPY PER DAY

## 2021-09-19 PROCEDURE — 99231 SBSQ HOSP IP/OBS SF/LOW 25: CPT | Performed by: INTERNAL MEDICINE

## 2021-09-19 PROCEDURE — 84132 ASSAY OF SERUM POTASSIUM: CPT

## 2021-09-19 PROCEDURE — 83880 ASSAY OF NATRIURETIC PEPTIDE: CPT

## 2021-09-19 RX ORDER — MAGNESIUM SULFATE IN WATER 40 MG/ML
2000 INJECTION, SOLUTION INTRAVENOUS ONCE
Status: COMPLETED | OUTPATIENT
Start: 2021-09-19 | End: 2021-09-20

## 2021-09-19 RX ORDER — DEXTROSE MONOHYDRATE 50 MG/ML
100 INJECTION, SOLUTION INTRAVENOUS PRN
Status: DISCONTINUED | OUTPATIENT
Start: 2021-09-19 | End: 2021-09-24 | Stop reason: HOSPADM

## 2021-09-19 RX ORDER — DEXTROSE MONOHYDRATE 25 G/50ML
12.5 INJECTION, SOLUTION INTRAVENOUS PRN
Status: DISCONTINUED | OUTPATIENT
Start: 2021-09-19 | End: 2021-09-24 | Stop reason: HOSPADM

## 2021-09-19 RX ORDER — MAGNESIUM SULFATE IN WATER 40 MG/ML
2000 INJECTION, SOLUTION INTRAVENOUS ONCE
Status: COMPLETED | OUTPATIENT
Start: 2021-09-19 | End: 2021-09-19

## 2021-09-19 RX ORDER — DEXTROSE MONOHYDRATE 25 G/50ML
25 INJECTION, SOLUTION INTRAVENOUS ONCE
Status: COMPLETED | OUTPATIENT
Start: 2021-09-19 | End: 2021-09-19

## 2021-09-19 RX ORDER — CALCIUM GLUCONATE 94 MG/ML
1000 INJECTION, SOLUTION INTRAVENOUS ONCE
Status: COMPLETED | OUTPATIENT
Start: 2021-09-19 | End: 2021-09-19

## 2021-09-19 RX ORDER — NICOTINE POLACRILEX 4 MG
15 LOZENGE BUCCAL PRN
Status: DISCONTINUED | OUTPATIENT
Start: 2021-09-19 | End: 2021-09-24 | Stop reason: HOSPADM

## 2021-09-19 RX ADMIN — APIXABAN 5 MG: 5 TABLET, FILM COATED ORAL at 22:06

## 2021-09-19 RX ADMIN — INSULIN HUMAN 10 UNITS: 100 INJECTION, SOLUTION PARENTERAL at 03:38

## 2021-09-19 RX ADMIN — BUDESONIDE 250 MCG: 0.25 SUSPENSION RESPIRATORY (INHALATION) at 08:28

## 2021-09-19 RX ADMIN — FUROSEMIDE 40 MG: 10 INJECTION, SOLUTION INTRAMUSCULAR; INTRAVENOUS at 17:49

## 2021-09-19 RX ADMIN — BUDESONIDE 250 MCG: 0.25 SUSPENSION RESPIRATORY (INHALATION) at 20:05

## 2021-09-19 RX ADMIN — INSULIN LISPRO 1 UNITS: 100 INJECTION, SOLUTION INTRAVENOUS; SUBCUTANEOUS at 22:08

## 2021-09-19 RX ADMIN — APIXABAN 5 MG: 5 TABLET, FILM COATED ORAL at 09:22

## 2021-09-19 RX ADMIN — DEXTROSE MONOHYDRATE 25 G: 25 INJECTION, SOLUTION INTRAVENOUS at 03:34

## 2021-09-19 RX ADMIN — MAGNESIUM SULFATE HEPTAHYDRATE 2000 MG: 40 INJECTION, SOLUTION INTRAVENOUS at 17:51

## 2021-09-19 RX ADMIN — ARFORMOTEROL TARTRATE 15 MCG: 15 SOLUTION RESPIRATORY (INHALATION) at 08:28

## 2021-09-19 RX ADMIN — Medication 10 ML: at 22:09

## 2021-09-19 RX ADMIN — INSULIN LISPRO 1 UNITS: 100 INJECTION, SOLUTION INTRAVENOUS; SUBCUTANEOUS at 06:56

## 2021-09-19 RX ADMIN — IPRATROPIUM BROMIDE AND ALBUTEROL SULFATE 1 AMPULE: .5; 2.5 SOLUTION RESPIRATORY (INHALATION) at 13:21

## 2021-09-19 RX ADMIN — METOPROLOL SUCCINATE 25 MG: 25 TABLET, EXTENDED RELEASE ORAL at 09:18

## 2021-09-19 RX ADMIN — Medication 10 ML: at 22:06

## 2021-09-19 RX ADMIN — ATORVASTATIN CALCIUM 40 MG: 40 TABLET, FILM COATED ORAL at 09:18

## 2021-09-19 RX ADMIN — Medication 10 ML: at 09:17

## 2021-09-19 RX ADMIN — ARFORMOTEROL TARTRATE 15 MCG: 15 SOLUTION RESPIRATORY (INHALATION) at 20:05

## 2021-09-19 RX ADMIN — CALCIUM GLUCONATE 1000 MG: 98 INJECTION, SOLUTION INTRAVENOUS at 03:34

## 2021-09-19 RX ADMIN — IPRATROPIUM BROMIDE AND ALBUTEROL SULFATE 1 AMPULE: .5; 2.5 SOLUTION RESPIRATORY (INHALATION) at 20:05

## 2021-09-19 RX ADMIN — FUROSEMIDE 40 MG: 10 INJECTION, SOLUTION INTRAMUSCULAR; INTRAVENOUS at 09:18

## 2021-09-19 RX ADMIN — IPRATROPIUM BROMIDE AND ALBUTEROL SULFATE 1 AMPULE: .5; 2.5 SOLUTION RESPIRATORY (INHALATION) at 08:28

## 2021-09-19 RX ADMIN — LEVOTHYROXINE SODIUM 112 MCG: 0.11 TABLET ORAL at 09:18

## 2021-09-19 RX ADMIN — ASPIRIN 81 MG: 81 TABLET ORAL at 09:18

## 2021-09-19 RX ADMIN — INSULIN GLARGINE 25 UNITS: 100 INJECTION, SOLUTION SUBCUTANEOUS at 22:07

## 2021-09-19 RX ADMIN — INSULIN LISPRO 1 UNITS: 100 INJECTION, SOLUTION INTRAVENOUS; SUBCUTANEOUS at 11:43

## 2021-09-19 NOTE — PROGRESS NOTES
[facility]  Internal Medicine Residency Program  Progress Note - House Team     Patient:  Devon Litten 74 y.o. male MRN: [id number]     Date of Service: 9/19/2021     CC: SOB    Subjective     Patient was seen and examined this morning at bedside in no acute distress. Overnight, no significant events. Pt seen at beside this morning. Pt admits to not sleeping well due to needing to urinate often. Pt admits to feelings of improvement in SOB. Pt denies any chest pain, nausea, vomiting, fevers or chills at this time     Objective     Physical Exam:  Vitals: BP (!) 148/64   Pulse 71   Temp 97 °F (36.1 °C) (Temporal)   Resp 18   Ht 5' 7.01\" (1.702 m)   Wt 264 lb 4 oz (119.9 kg)   SpO2 93%   BMI 41.38 kg/m²     I & O - 24hr: No intake/output data recorded. General Appearance: alert, appears stated age, cooperative and moderately obese  HEENT:  Head: Normocephalic, no lesions, without obvious abnormality. Lung: Rales appreciated in bilateral upper lung fields. Heart: regular rate and rhythm, S1, S2 normal, no murmur, click, rub or gallop and Distant heart sounds  Abdomen: Abdomen is protuberant and distended. Abdomen is rigid on exam.   Extremities:  extremities normal, atraumatic, no cyanosis. Bilaterally 4+ pitting edema appreciated on LE up to knees. Musculokeletal: No joint swelling, no muscle tenderness. ROM normal in all joints of extremities.    Neurologic: Mental status: Alert, oriented, thought content appropriate  Subject  Pertinent Labs & Imaging Studies   carmela  CBC with Differential:    Lab Results   Component Value Date    WBC 8.6 09/18/2021    RBC 3.39 09/18/2021    HGB 10.3 09/18/2021    HCT 33.8 09/18/2021     09/18/2021    MCV 99.7 09/18/2021    MCH 30.4 09/18/2021    MCHC 30.5 09/18/2021    RDW 15.2 09/18/2021    SEGSPCT 68 04/27/2011    LYMPHOPCT 14.0 09/18/2021    MONOPCT 8.8 09/18/2021    EOSPCT 7 10/05/2010    BASOPCT 0.8 09/18/2021    MONOSABS 0.75 09/18/2021 LYMPHSABS 1.20 09/18/2021    EOSABS 0.35 09/18/2021    BASOSABS 0.07 09/18/2021     CMP:    Lab Results   Component Value Date     09/18/2021    K 5.7 09/18/2021    K 5.5 09/18/2021     09/18/2021    CO2 26 09/18/2021    BUN 31 09/18/2021    CREATININE 1.3 09/18/2021    GFRAA >60 09/18/2021    LABGLOM 55 09/18/2021    GLUCOSE 157 09/18/2021    GLUCOSE 133 04/18/2012    PROT 6.9 09/18/2021    LABALBU 3.9 09/18/2021    LABALBU 4.4 10/25/2011    CALCIUM 9.1 09/18/2021    BILITOT 0.5 09/18/2021    ALKPHOS 85 09/18/2021    AST 18 09/18/2021    ALT 20 09/18/2021       CT CHEST WO CONTRAST   Final Result   Intervally new small to moderate left and trace right pleural effusions. Mild thickening of secondary interlobular septa with ground-glass attenuation   at some levels, compatible with an element of interstitial pulmonary edema. Patchy mild subsegmental atelectasis bilaterally, worse within the basal left   lower lobe and inferior lingula. Multiple essentially stable pulmonary nodules bilaterally. Intervally new small volume ascites and flank edema. Mild upper abdominal lymphadenopathy. RECOMMENDATIONS:   Multiple pulmonary nodules. Most severe: 5 mm solid pulmonary nodule. No   routine follow-up imaging is recommended. These guidelines do not apply to immunocompromised patients and patients with   cancer. Follow up in patients with significant comorbidities as clinically   warranted. For lung cancer screening, adhere to Lung-RADS guidelines. Reference: Radiology. 2017; 284(1):228-43. XR CHEST (2 VW)   Final Result   Left-sided pleural effusion. Cardiomegaly. Discrete patchy infiltrate in the left base questionable on the right. Cannot exclude bilateral pneumonia. Please correlate clinically.               Resident's Assessment and Plan     Assessment and Plan:    Acute Decompensated HFpEF (EF: 60-65%, 10/2020)  Pt presents with 4+ pitting edema in LE to his knees and anasarca  proBNP 1596 on admission  Pt saw PCP on 9/13/21 and increased his Lasix from 40mg daily to 40mg BID, pt did not have any improvement of SOB after this change  Pt resented to the ED w/ SOB and was given 40mg IV in the ED  Pt started on Lasix 40mg IV BID  Strict I/O  Repeat Echo to assess for any changes in heart function - Active  Pt is candidate for ischemic work-up after acute concerns have resolved. Will need KEG and possibly stress test to assess for worsening HF  Continue home med: Metoprolol 25mg    Chronic hypoxic respiratory failure 2/2 obesity related restrictive lung disease and acute decompensated HF  Currently on 4L O2 at home  Duoneb, brovana and pulmicort  CT Chest w/o contrast (9/18/21): New small/moderate L. And trace R. Sided pleural effusions. 5mm solid pulmonary nodule also appreciated. No f/u imaging recommended. CRP: 0.3    L. Sided pleural effusion likely 2/2 HFpEF exacerbation  CXR (9/17/21): Showed L. Sided pleural effusion  CT Chest w/o contrast (9/18/21): New small/moderate L. And trace R. Sided pleural effusions. 5mm solid pulmonary nodule also appreciated. No f/u imaging recommended. Continue Lasix 40mg IV BID    Persistent A.  Fib s/p dual chamber pacemaker placement (2017)  Currently managed on Eliquis 5mg BID, AMD20sp, Metoprolol 25mg  Pt is currently in sinus rhythm continue to monitor    Elevated troponin  Troponin 41 on admission -> 43  Continue to trend  CK: 61, CK-MB: 2.7    Insulin dependent DM  Follow BG AC/HS  BG stable between 140-200  Pt on Lantus 25u and LDSS  Pt on cardiac/diabetic/potassium restricted diet    Hyperkalemia  Pt presented to the ED w/ K: 6.3  Pt was given calcium gluconate in ED  Pt on cardiac/diabetic/potassium restricted diet  Continue to monitor K        Inactive Concerns  Sinus node dysfunction s/p dual chamber pacemaker  Hx of HLD - Continue home med: Lipitor 40mg  Essential HTN - Home meds: Lisinopril and HCTZ - holding at

## 2021-09-19 NOTE — PROGRESS NOTES
[address]   Internal Medicine Residency / [address]    Attending Physician Statement  I have discussed the case, including pertinent history and exam findings with the resident and the team.  I have seen and examined the patient and the key elements of the encounter have been performed by me. I agree with the assessment, plan and orders as documented by the resident. Laying comfortably  And Hx of increasing peripheral edema and pleural effusion  Has COPD and NIKO and obese  Plus DM2, HTN and HLD  Diuresing slowly on Lasix 40 mg bid IV  Cr 1.4 Albumin 3.9  Impression: BV CHF with higher EF(Diastolic CHF)                      NO previouus Pulm HTN                      Trifascicular block on EKG  Plan: Same meds for now           Recheck 2 D ECHO             Attempt NIKO treatment while here           Follow K closely since it is still higher    Remainder of medical problems as per resident note.       Larry Gordillo MD [facility] AT [address]  Internal Medicine Residency Faculty

## 2021-09-20 PROBLEM — I50.31 ACUTE HEART FAILURE WITH PRESERVED EJECTION FRACTION (HFPEF) (HCC): Status: ACTIVE | Noted: 2021-09-17

## 2021-09-20 LAB
ANION GAP SERPL CALCULATED.3IONS-SCNC: 10 MMOL/L (ref 7–16)
ANION GAP SERPL CALCULATED.3IONS-SCNC: 8 MMOL/L (ref 7–16)
BASOPHILS ABSOLUTE: 0.07 E9/L (ref 0–0.2)
BASOPHILS RELATIVE PERCENT: 1 % (ref 0–2)
BUN BLDV-MCNC: 29 MG/DL (ref 6–23)
BUN BLDV-MCNC: 31 MG/DL (ref 6–23)
CALCIUM SERPL-MCNC: 9.3 MG/DL (ref 8.6–10.2)
CALCIUM SERPL-MCNC: 9.9 MG/DL (ref 8.6–10.2)
CHLORIDE BLD-SCNC: 101 MMOL/L (ref 98–107)
CHLORIDE BLD-SCNC: 97 MMOL/L (ref 98–107)
CO2: 29 MMOL/L (ref 22–29)
CO2: 35 MMOL/L (ref 22–29)
CREAT SERPL-MCNC: 1.3 MG/DL (ref 0.7–1.2)
CREAT SERPL-MCNC: 1.4 MG/DL (ref 0.7–1.2)
EKG ATRIAL RATE: 65 BPM
EKG Q-T INTERVAL: 428 MS
EKG QRS DURATION: 132 MS
EKG QTC CALCULATION (BAZETT): 445 MS
EKG R AXIS: 127 DEGREES
EKG T AXIS: 19 DEGREES
EKG VENTRICULAR RATE: 65 BPM
EOSINOPHILS ABSOLUTE: 0.31 E9/L (ref 0.05–0.5)
EOSINOPHILS RELATIVE PERCENT: 4.4 % (ref 0–6)
GFR AFRICAN AMERICAN: >60
GFR AFRICAN AMERICAN: >60
GFR NON-AFRICAN AMERICAN: 50 ML/MIN/1.73
GFR NON-AFRICAN AMERICAN: 55 ML/MIN/1.73
GLUCOSE BLD-MCNC: 160 MG/DL (ref 74–99)
GLUCOSE BLD-MCNC: 171 MG/DL (ref 74–99)
HCT VFR BLD CALC: 31.2 % (ref 37–54)
HEMOGLOBIN: 9.8 G/DL (ref 12.5–16.5)
IMMATURE GRANULOCYTES #: 0.1 E9/L
IMMATURE GRANULOCYTES %: 1.4 % (ref 0–5)
LV EF: 58 %
LVEF MODALITY: NORMAL
LYMPHOCYTES ABSOLUTE: 1.16 E9/L (ref 1.5–4)
LYMPHOCYTES RELATIVE PERCENT: 16.5 % (ref 20–42)
MAGNESIUM: 1.8 MG/DL (ref 1.6–2.6)
MCH RBC QN AUTO: 30.7 PG (ref 26–35)
MCHC RBC AUTO-ENTMCNC: 31.4 % (ref 32–34.5)
MCV RBC AUTO: 97.8 FL (ref 80–99.9)
METER GLUCOSE: 154 MG/DL (ref 74–99)
METER GLUCOSE: 160 MG/DL (ref 74–99)
METER GLUCOSE: 175 MG/DL (ref 74–99)
METER GLUCOSE: 241 MG/DL (ref 74–99)
MONOCYTES ABSOLUTE: 0.74 E9/L (ref 0.1–0.95)
MONOCYTES RELATIVE PERCENT: 10.5 % (ref 2–12)
NEUTROPHILS ABSOLUTE: 4.65 E9/L (ref 1.8–7.3)
NEUTROPHILS RELATIVE PERCENT: 66.2 % (ref 43–80)
PDW BLD-RTO: 14.7 FL (ref 11.5–15)
PHOSPHORUS: 4.6 MG/DL (ref 2.5–4.5)
PLATELET # BLD: 226 E9/L (ref 130–450)
PMV BLD AUTO: 10 FL (ref 7–12)
POTASSIUM SERPL-SCNC: 4.7 MMOL/L (ref 3.5–5)
POTASSIUM SERPL-SCNC: 5.3 MMOL/L (ref 3.5–5)
RBC # BLD: 3.19 E12/L (ref 3.8–5.8)
SODIUM BLD-SCNC: 140 MMOL/L (ref 132–146)
SODIUM BLD-SCNC: 140 MMOL/L (ref 132–146)
TSH SERPL DL<=0.05 MIU/L-ACNC: 8.35 UIU/ML (ref 0.27–4.2)
WBC # BLD: 7 E9/L (ref 4.5–11.5)

## 2021-09-20 PROCEDURE — 6360000002 HC RX W HCPCS: Performed by: INTERNAL MEDICINE

## 2021-09-20 PROCEDURE — 2580000003 HC RX 258: Performed by: INTERNAL MEDICINE

## 2021-09-20 PROCEDURE — 93010 ELECTROCARDIOGRAM REPORT: CPT | Performed by: INTERNAL MEDICINE

## 2021-09-20 PROCEDURE — 6360000004 HC RX CONTRAST MEDICATION: Performed by: INTERNAL MEDICINE

## 2021-09-20 PROCEDURE — 82962 GLUCOSE BLOOD TEST: CPT

## 2021-09-20 PROCEDURE — 2700000000 HC OXYGEN THERAPY PER DAY

## 2021-09-20 PROCEDURE — 83735 ASSAY OF MAGNESIUM: CPT

## 2021-09-20 PROCEDURE — 94640 AIRWAY INHALATION TREATMENT: CPT

## 2021-09-20 PROCEDURE — 80048 BASIC METABOLIC PNL TOTAL CA: CPT

## 2021-09-20 PROCEDURE — 93306 TTE W/DOPPLER COMPLETE: CPT

## 2021-09-20 PROCEDURE — 84443 ASSAY THYROID STIM HORMONE: CPT

## 2021-09-20 PROCEDURE — 36415 COLL VENOUS BLD VENIPUNCTURE: CPT

## 2021-09-20 PROCEDURE — 85025 COMPLETE CBC W/AUTO DIFF WBC: CPT

## 2021-09-20 PROCEDURE — 6370000000 HC RX 637 (ALT 250 FOR IP): Performed by: INTERNAL MEDICINE

## 2021-09-20 PROCEDURE — 94760 N-INVAS EAR/PLS OXIMETRY 1: CPT

## 2021-09-20 PROCEDURE — 99232 SBSQ HOSP IP/OBS MODERATE 35: CPT | Performed by: INTERNAL MEDICINE

## 2021-09-20 PROCEDURE — 94660 CPAP INITIATION&MGMT: CPT

## 2021-09-20 PROCEDURE — 2060000000 HC ICU INTERMEDIATE R&B

## 2021-09-20 PROCEDURE — 84100 ASSAY OF PHOSPHORUS: CPT

## 2021-09-20 RX ORDER — FUROSEMIDE 10 MG/ML
80 INJECTION INTRAMUSCULAR; INTRAVENOUS ONCE
Status: COMPLETED | OUTPATIENT
Start: 2021-09-20 | End: 2021-09-20

## 2021-09-20 RX ORDER — LEVOTHYROXINE SODIUM 112 UG/1
112 TABLET ORAL DAILY
Status: DISCONTINUED | OUTPATIENT
Start: 2021-09-21 | End: 2021-09-21

## 2021-09-20 RX ORDER — MAGNESIUM SULFATE 1 G/100ML
1000 INJECTION INTRAVENOUS ONCE
Status: COMPLETED | OUTPATIENT
Start: 2021-09-20 | End: 2021-09-20

## 2021-09-20 RX ORDER — AMLODIPINE BESYLATE 5 MG/1
5 TABLET ORAL DAILY
Status: DISCONTINUED | OUTPATIENT
Start: 2021-09-20 | End: 2021-09-24 | Stop reason: HOSPADM

## 2021-09-20 RX ADMIN — APIXABAN 5 MG: 5 TABLET, FILM COATED ORAL at 11:09

## 2021-09-20 RX ADMIN — METOPROLOL SUCCINATE 25 MG: 25 TABLET, EXTENDED RELEASE ORAL at 11:10

## 2021-09-20 RX ADMIN — BUDESONIDE 250 MCG: 0.25 SUSPENSION RESPIRATORY (INHALATION) at 09:01

## 2021-09-20 RX ADMIN — INSULIN GLARGINE 25 UNITS: 100 INJECTION, SOLUTION SUBCUTANEOUS at 21:29

## 2021-09-20 RX ADMIN — AMLODIPINE BESYLATE 5 MG: 5 TABLET ORAL at 13:20

## 2021-09-20 RX ADMIN — LEVOTHYROXINE SODIUM 112 MCG: 0.11 TABLET ORAL at 12:08

## 2021-09-20 RX ADMIN — INSULIN LISPRO 2 UNITS: 100 INJECTION, SOLUTION INTRAVENOUS; SUBCUTANEOUS at 18:21

## 2021-09-20 RX ADMIN — PERFLUTREN 1.65 MG: 6.52 INJECTION, SUSPENSION INTRAVENOUS at 14:32

## 2021-09-20 RX ADMIN — FUROSEMIDE 40 MG: 10 INJECTION, SOLUTION INTRAMUSCULAR; INTRAVENOUS at 18:08

## 2021-09-20 RX ADMIN — MAGNESIUM SULFATE HEPTAHYDRATE 2000 MG: 40 INJECTION, SOLUTION INTRAVENOUS at 00:10

## 2021-09-20 RX ADMIN — INSULIN LISPRO 1 UNITS: 100 INJECTION, SOLUTION INTRAVENOUS; SUBCUTANEOUS at 06:31

## 2021-09-20 RX ADMIN — ATORVASTATIN CALCIUM 40 MG: 40 TABLET, FILM COATED ORAL at 11:26

## 2021-09-20 RX ADMIN — IPRATROPIUM BROMIDE AND ALBUTEROL SULFATE 1 AMPULE: .5; 2.5 SOLUTION RESPIRATORY (INHALATION) at 21:06

## 2021-09-20 RX ADMIN — ASPIRIN 81 MG: 81 TABLET ORAL at 11:09

## 2021-09-20 RX ADMIN — IPRATROPIUM BROMIDE AND ALBUTEROL SULFATE 1 AMPULE: .5; 2.5 SOLUTION RESPIRATORY (INHALATION) at 15:31

## 2021-09-20 RX ADMIN — INSULIN LISPRO 1 UNITS: 100 INJECTION, SOLUTION INTRAVENOUS; SUBCUTANEOUS at 21:29

## 2021-09-20 RX ADMIN — APIXABAN 5 MG: 5 TABLET, FILM COATED ORAL at 21:29

## 2021-09-20 RX ADMIN — Medication 10 ML: at 12:09

## 2021-09-20 RX ADMIN — INSULIN LISPRO 2 UNITS: 100 INJECTION, SOLUTION INTRAVENOUS; SUBCUTANEOUS at 11:28

## 2021-09-20 RX ADMIN — MAGNESIUM SULFATE HEPTAHYDRATE 1000 MG: 1 INJECTION, SOLUTION INTRAVENOUS at 11:09

## 2021-09-20 RX ADMIN — BUDESONIDE 250 MCG: 0.25 SUSPENSION RESPIRATORY (INHALATION) at 21:06

## 2021-09-20 RX ADMIN — ARFORMOTEROL TARTRATE 15 MCG: 15 SOLUTION RESPIRATORY (INHALATION) at 21:06

## 2021-09-20 RX ADMIN — IPRATROPIUM BROMIDE AND ALBUTEROL SULFATE 1 AMPULE: .5; 2.5 SOLUTION RESPIRATORY (INHALATION) at 09:00

## 2021-09-20 RX ADMIN — FUROSEMIDE 80 MG: 10 INJECTION, SOLUTION INTRAMUSCULAR; INTRAVENOUS at 11:08

## 2021-09-20 RX ADMIN — Medication 10 ML: at 21:29

## 2021-09-20 RX ADMIN — ARFORMOTEROL TARTRATE 15 MCG: 15 SOLUTION RESPIRATORY (INHALATION) at 09:00

## 2021-09-20 RX ADMIN — IPRATROPIUM BROMIDE AND ALBUTEROL SULFATE 1 AMPULE: .5; 2.5 SOLUTION RESPIRATORY (INHALATION) at 13:18

## 2021-09-20 ASSESSMENT — PAIN DESCRIPTION - PAIN TYPE: TYPE: CHRONIC PAIN

## 2021-09-20 ASSESSMENT — PAIN DESCRIPTION - ORIENTATION: ORIENTATION: RIGHT;LEFT

## 2021-09-20 ASSESSMENT — PAIN DESCRIPTION - FREQUENCY: FREQUENCY: CONTINUOUS

## 2021-09-20 ASSESSMENT — PAIN DESCRIPTION - DESCRIPTORS: DESCRIPTORS: ACHING

## 2021-09-20 ASSESSMENT — PAIN DESCRIPTION - ONSET: ONSET: ON-GOING

## 2021-09-20 ASSESSMENT — PAIN DESCRIPTION - LOCATION: LOCATION: BACK;SHOULDER;NECK

## 2021-09-20 ASSESSMENT — PAIN SCALES - GENERAL: PAINLEVEL_OUTOF10: 8

## 2021-09-20 NOTE — PROGRESS NOTES
[facility]  Internal Medicine Residency Program  Progress Note - House Team     Patient:  Merlin Peraza 74 y.o. male MRN: [id number]     Date of Service: 9/20/2021     CC: shortness of breath and increasing weight  Overnight events:  No acute events noted    Subjective     Patient was seen and examined this morning at bedside in no acute distress, lying comfortably in bed on 4L NC. Patient states that he has been feeling better than before and that diuresis has been helping him. He believes his leg swelling and abdomen has improved. Denies any chest pain, shortness of breath, nausea, vomiting. No active complaints. Overnight, patient was stable. No acute evens reported. However, his pressures has been elevated, around 792-424A systolic. Added amlodipine 5mg  to his current regimen. Patient has been refusing to wear CPAP at night as he does not think the mask is comfortable. Objective     Physical Exam:  Vitals: BP (!) 148/69   Pulse 79   Temp 95.8 °F (35.4 °C) (Temporal)   Resp 20   Ht 5' 7.01\" (1.702 m)   Wt 258 lb 9 oz (117.3 kg)   SpO2 100%   BMI 40.49 kg/m²     I & O - 24hr: I/O this shift:  In: 360 [P.O.:360]  Out: 1200 [Urine:1200]   General Appearance: alert, appears stated age, cooperative and morbidly obese  HEENT:  Head: Normocephalic, no lesions, without obvious abnormality. Eye: Normal external eye, conjunctiva, lids cornea, JELENA. Nose: Normal external nose, mucus membranes and septum. Neck / Thyroid: Supple, no masses, nodes, nodules or enlargement.   Neck: no adenopathy, no carotid bruit, no JVD, supple, symmetrical, trachea midline and thyroid not enlarged, symmetric, no tenderness/mass/nodules  Lung: clear to auscultation bilaterally  Heart: regular rate and rhythm, S1, S2 normal, no murmur, click, rub or gallop  Abdomen: normal findings: bowel sounds normal, no scars, striae, dilated veins, rashes, or lesions and symmetric and abnormal findings:  distended and protuberant, rigid on exam  Extremities:  edema +3 on bilateral lower extremity  Musculokeletal: No joint swelling, no muscle tenderness. ROM normal in all joints of extremities. Neurologic: Mental status: Alert, oriented, thought content appropriate  Subject  Pertinent Labs & Imaging Studies   carmela  CBC with Differential:    Lab Results   Component Value Date    WBC 7.0 09/20/2021    RBC 3.19 09/20/2021    HGB 9.8 09/20/2021    HCT 31.2 09/20/2021     09/20/2021    MCV 97.8 09/20/2021    MCH 30.7 09/20/2021    MCHC 31.4 09/20/2021    RDW 14.7 09/20/2021    SEGSPCT 68 04/27/2011    LYMPHOPCT 16.5 09/20/2021    MONOPCT 10.5 09/20/2021    EOSPCT 7 10/05/2010    BASOPCT 1.0 09/20/2021    MONOSABS 0.74 09/20/2021    LYMPHSABS 1.16 09/20/2021    EOSABS 0.31 09/20/2021    BASOSABS 0.07 09/20/2021     BMP:    Lab Results   Component Value Date     09/20/2021    K 5.3 09/20/2021    K 5.5 09/18/2021     09/20/2021    CO2 29 09/20/2021    BUN 31 09/20/2021    LABALBU 3.9 09/18/2021    LABALBU 4.4 10/25/2011    CREATININE 1.3 09/20/2021    CALCIUM 9.3 09/20/2021    GFRAA >60 09/20/2021    LABGLOM 55 09/20/2021    GLUCOSE 171 09/20/2021    GLUCOSE 133 04/18/2012     Hepatic Function Panel:    Lab Results   Component Value Date    ALKPHOS 85 09/18/2021    ALT 20 09/18/2021    AST 18 09/18/2021    PROT 6.9 09/18/2021    BILITOT 0.5 09/18/2021    BILIDIR <0.2 05/25/2017    IBILI see below 05/25/2017    LABALBU 3.9 09/18/2021    LABALBU 4.4 10/25/2011       CT CHEST WO CONTRAST   Final Result   Intervally new small to moderate left and trace right pleural effusions. Mild thickening of secondary interlobular septa with ground-glass attenuation   at some levels, compatible with an element of interstitial pulmonary edema. Patchy mild subsegmental atelectasis bilaterally, worse within the basal left   lower lobe and inferior lingula. Multiple essentially stable pulmonary nodules bilaterally. Intervally new small volume ascites and flank edema. Mild upper abdominal lymphadenopathy. RECOMMENDATIONS:   Multiple pulmonary nodules. Most severe: 5 mm solid pulmonary nodule. No   routine follow-up imaging is recommended. These guidelines do not apply to immunocompromised patients and patients with   cancer. Follow up in patients with significant comorbidities as clinically   warranted. For lung cancer screening, adhere to Lung-RADS guidelines. Reference: Radiology. 2017; 284(1):228-43. XR CHEST (2 VW)   Final Result   Left-sided pleural effusion. Cardiomegaly. Discrete patchy infiltrate in the left base questionable on the right. Cannot exclude bilateral pneumonia. Please correlate clinically. Resident's Assessment and Plan     Reyna Sosa is a 70-year old male with a past medical history of HFpEF (EF 60-65%), persistent Afib, hypertension, hypothyroidism, IDDM Type 2, sinus node dysfunction s/p dual chamber pacemaker, morbid obesity with restrictive lung disease, presented with shortness of breath and continued weight gain. Patient was seen by his PCP 4 days PTA, his Lasix dose was increased from 40mg daily to 40mg BID; however, was still having increased swelling on both lower extremities and abdomen. He noticed weight gain of at least 20 lbs for the past month. He is on 4L NC at home. CXR showed L-sided pleural effusion, given 1 dose of Lasix in the ED which helped him. He had K level of 6.3 during admission for which he was Ca gluconate; however, his current K levels are still at 5.3-5.5. His current dosage of Lasix was continued. On hospital day 3, patient has been improving. Leg swelling has decreased.  He is currently being managed for the following:    Assessment and Plan:    Cardiovascular  Acute decompensated HFpEF (EF 60-65%)  Presented with +4 pitting edema in LE to his knees and anasarca  Seen by PCP on 9/13/21, increased his Lasix dose from 40mg daily to 40mg BID, however no improvement on swelling noted  CT chest showed bilateral pleural effusion L>R with interstitial pulmonary edema and new small volume ascites & flank edema  Started on Lasix 40mg IV BID  Currently has decreased swelling. +3 on BLE; and decreasing weight (265 lbs -> 258 lbs)  Echo done in 10/2020 showed normal LV and RV size and systolic function, EF 88-67%, indeterminate diastolic function, moderate LV concentric hypertrophy, abnormal LV septal motion consistent with paced rhythm. Plan:  Continue Lasix 40m IV BID  Give 1 dose of Lasix 80mg IV today  Follow-up BMP this PM  Awaiting rpt echo to assess changes in heart function  For ischemic workup once acute concerns have resolved. May need EKG and possibly stress test to assess worsening HF  Consider bumex if response to lasix is not adequate  For CHF nurse consult  Strict I/O  Monitor daily weight    Persistent Atrial Fibrillation  Currently rate controlled at 60-80s bpm  Plan:  Continue metoprolol 25mg daily, aspirin 81mg daily and eliquis 5mg BID    Sinus node dysfunction s/p dual chamber pacemaker    History of Hypertension  Home meds: Lisinopril 40mg daily and HCTZ 25mg daily - currently on hold due to hyperkalemia and increased creatinine  Currently on metoprolol 25mg daily and furosemide 40mg BID  Current BP ranges 120-150s SBP/50-70s DBP  Plan:  Continue to hold home antihypertensive medications  Continue metoprolol 25mg daily and furosemide 40mg BID  Add amlodipine 5mg daily  Monitor BMP daily  Monitor BP    History of hyperlipidemia  Lipid panel 6/14/21 was significant for elevated triglyceride level at 165  Plan:  Continue atorvastatin 40mg daily    Pulmonary  Chronic hypoxic respiratory failure 2/2 obesity related restrictive lung disease and acute decompensated HF  Currently at 4L NC at home  Chest CT showed new small/moderate L and trace R-sided pleural effusions with 5mm stable solid pulmonary nodule noted.   PFT in 2020 consistent with moderate restrictive pathology  On breathing treatments with Duoneb, Brovana and Pulmicort  Plan:  Continue breathing treatments  Obtain ambulatory pulse oximetry    Left sided pleural effusion 2/2 HFpEF exacerbation  CXR showed L-sided pleural effusion  Chest CT showed new small/moderate L and trace R-sided pleural effusions   Plan:  Continue Lasix 40mg BID  Give 1 dose of Lasix 80mg today  F/u BMP this PM    Obstructive Sleep Apnea  Non-compliant with CPAP  Plan:  Continue CPAP overnight and during naps    Genitourinary/Renal  Hyperkalemia  K on admission 6.3, treated in ED with Ca gluconate  Was on K supplements  Currently downtrending  Started on K-restricted diet  Plan:  Continue Lasix 40mg BID  Continue K restriction  Monitor BMP daily    Endocrine  Insulin-Dependent Diabetes Mellitus Type 2  Current blood glucose ranges: 130-175, controlled  On lantus 25u + LDSSI  Plan:  Continue current insulin regimen  Glucose checks AC/HS  Continue hypoglycemia treatment    History of Hypothyroidism  TSH 6/4/21: 5.94 (elevated)  On levothyroxine 112mcg daily  Plan:  Obtain TSH  Continue levothyroxine 112mcg daily    History of obesity  BMI 41.38    PT/OT evaluation: ordered  DVT prophylaxis/ GI prophylaxis: apixaban/on diet  Disposition: continue current care    Nahum Her MD, PGY-1  Attending physician: Dr. Caterina Dennis

## 2021-09-20 NOTE — CONSULTS
Patient currently admitted with diagnosis of Diastolic heart failure. Repeat Echo has been ordered this admission, awaiting results. Patient is established with [facility] but says he does not have a cardiologist at this time. PCP is Dr. Zuhair Appiah. Wander's wife, Jennifer Hart is present for conversation. Patient was alert and oriented during the consultation. He was engaged and asked appropriate questions throughout the education session. Per Andre Baca put salt on everything, even fruit\". Roosevelt Hamman has a working scale but does not weigh himself and on diuretic, Lasix at home and inpatient IV BID. Discussed how daily weigh-ins are important for catching signs of fluid retention by the increased weight and how just a few pounds can make breathing or activity challenging. Patient stated, [facility] that's what brought me in, I couldn't breathe when I walked just from here to there\". Patient agreeable to weigh himself daily and write it down on weight chart provided to him today. Talked about removing salt shaker from kitchen and using fresh herbs &  garlic to season food and to allow approximately 30 days for his taste buds to adjust. Asked patient if he was open to try this and he replied, \"yes, I have to, I can't take the chance. \"  Patient and wife expressed interest when we discussed outpatient resources like the CHF clinics that [facility] has established with one at each facility[facility] so [facility] would be optimal distance wise. Did NOT discuss salt substitutes at this time due to HYPERKALEMIA.     Future Appointments   Date Time Provider Stacie Mtz   9/27/2021  1:30 PM Khoa Brooks MD [facility]   1/4/2022  2:00 PM [facility]            We reviewed the introduction to Heart Failure, the HF zones, signs and symptoms to report on day 1 of onset, medications, medication compliance, the importance of obtaining daily weights, following a low sodium diet, reading food labels for the sodium content, keeping physician appointments, and smoking cessation. We discussed writing / tracking daily weights on a calendar / log because a 5 pound gain in 1 week can sneak up if you are not tracking it. You can also take your charted weights to your doctor appointments to be reviewed. Contributing risk factors for Heart Failure are identified as not weighing himself daily, sprinkles salt on majority of foods, even prior to tasting. Not knowing about sodium and fluid retention relationship and having limited knowledge of heart failure. I advised patient they can reduce the risk for Heart Failure exacerbations by modifying / controlling the risk factors. We discussed self-managed care which includes the following:  to take medications as prescribed, report any intolerable side effects of medications to the cardiologist / doctor, do not just stop taking the medication; follow a cardiac heart healthy / low sodium diet; weigh yourself daily, exercise regularly- per doctor recommendation and not to smoke or use an excess amount of alcohol. We discussed calling the cardiologist / doctor with a weight gain of 3 pounds in one day or a total of 5 pounds or more in one week. Also, if you should have a significant weight loss of 3# or more in one day to call the doctor, they may need to decrease or hold the diuretic dose. On days you feels nauseated and not eating / drinking, having emesis or diarrhea,  informed to call the cardiologist  / doctor, they may need to decrease or hold diuretic to avoid dehydration. I stressed the importance of informing their cardiologist the first day of onset of any of the signs and symptoms in the \"Yellow Zone\" of the HF Zones. Patient verbalizes understanding. Greater than 30 minutes was spent educating patient.           BNP:   Lab Results   Component Value Date    PROBNP 1,600 (H) 09/19/2021       History of: has a past medical history of Atrial fibrillation ([facility] [address] Carpal tunnel syndrome, Hyperlipidemia, Hypertension, Hypothyroidism, Lung nodule, Nocturnal hypoxemia due to obesity, Obesity, NIKO (obstructive sleep apnea), Osteoarthritis, Pinched nerve, Restrictive lung disease secondary to obesity, Sinus node dysfunction ([address] and Type II or unspecified type diabetes mellitus without mention of complication, not stated as uncontrolled. has a past surgical history that includes Colonoscopy (2007); back surgery (2012); eye surgery; hernia repair; Colonoscopy (06/04/12); back surgery (05/30/2017); pacemaker placement (10/03/2017); and Rotator cuff repair (Right). Medications:    amLODIPine  5 mg Oral Daily    insulin lispro  0-12 Units SubCUTAneous TID WC    insulin lispro  0-6 Units SubCUTAneous Nightly    [START ON 9/21/2021] levothyroxine  112 mcg Oral Daily    apixaban  5 mg Oral BID    aspirin  81 mg Oral Daily    atorvastatin  40 mg Oral Daily    metoprolol succinate  25 mg Oral Daily    sodium chloride flush  10 mL IntraVENous 2 times per day    insulin glargine  25 Units SubCUTAneous Nightly    ipratropium-albuterol  1 ampule Inhalation Q4H WA    budesonide  250 mcg Nebulization BID    Arformoterol Tartrate  15 mcg Nebulization BID    furosemide  40 mg IntraVENous BID      dextrose      sodium chloride         Code Status:Full Code    The patient is ordered:  Diet: ADULT DIET; Regular; 3 carb choices (45 gm/meal); Low Fat/Low Chol/High Fiber/LULA; Low Sodium (2 gm);  Low Potassium (Less than 3000 mg/day)   Sodium controlled diet Yes  Fluid restriction daily ordered (fluid restriction recommended if patient is hyponatremic and/or diuretic is initiated or increased) No  FR:   Daily Weights:   Patient Vitals for the past 96 hrs (Last 3 readings):   Weight   09/20/21 0549 258 lb 9 oz (117.3 kg)   09/19/21 0800 264 lb 4 oz (119.9 kg)   09/18/21 1515 265 lb (120.2 kg)     I/O:     Intake/Output Summary (Last 24 hours) at 9/20/2021 1441  Last data filed at 9/20/2021 1333  Gross per 24 hour   Intake 600 ml   Output 1475 ml   Net -875 ml        The Heart Failure Booklet given to the patient with additional patient education addressing:  · What is Heart Failure? · Things You Can Do to Live Well with HF  · How to Take Your Medications  · How to Eat Less Salt  · Exercising Well with Heart Failure  · Signs and symptoms of HF to report  · Weight Yourself Each Day  · Home Self Management- activity, weight tracking, taking medications as prescribed, meals /diet planning (sodium and fluid restriction), how to read food labels, keeping physician follow ups, smoking cessation, follow the Heart Failure Zones  · The Heart Failure zones  · Sodium content pamphlet  · What foods I should avoid tip sheet    Instructed  to call 911 if you have any of the following symptoms:    ·    Struggling to breathe unrelieved with rest,  ·    Having chest pain, confusion or can't think clearly  ·    Have confusion or cant think clearly    Readmission Risk Score: 16        Discharge Plan:  I placed the Heart Failure Home Instructions in patient's discharge instructions. Per Heart Failure GWTG, the patient should have a follow-up appointment made within 7 days of discharge. Gerda Conley RN   Heart Failure Navigator        CONGESTIVE HEART FAILURE (CHF) GUIDELINES  (Must be completed for Primary Diagnosis CHF or History of CHF)    Type of CHF:    [] Acute   [] Chronic     [x] Acute on Chronic     Ventricular Function Assessment (check):        [x] HFpEF  [] HFpEF, borderline  [] HFpEF, improved  [] HFrEF  . Echocardiogram: 10/20/20  Normal left ventricular size and systolic function. Ejection fraction is visually estimated at 60-65%. Indeterminate diastolic function. No regional wall motion abnormalities seen. Moderate left ventricular concentric hypertrophy noted.    Abnormal LV septal motion consistent with paced rhythm  Ejection Fraction (%):  60-65% 10/20/20                                                                 Angiotensin-Converting-Enzyme (ACE) inhibitor ordered:  [] Yes  [x] No (specify contraindication):  [] Renal Insufficiency  [] Cough  [] Hypotension  [] Allergy/angioedema  [x] No left ventricular systolic dysfunction (LVSD)  [x] Hyperkalemia  [] Moderate to severe aortic stenosis  [] Other (Specify):     Angiotensin II receptor blockers (ARB) ordered:  [] Yes  [x] No (specify contraindication):  [] Renal Insufficiency  [] Hypotension  [] Allergy/angioedema  [x] No LVSD  [] Hyperkalemia  [] Moderate to severe aortic stenosis  [] Other (Specify):      ARNI - Angiotensin Receptor Neprilysin Inhibitor ordered:  [] Yes  [x] No      ACC/AHA Guidelines Beta Blocker (Carvedilol, Metoprolol Succinate, or Bisoprolol) ordered:    [x] Yes-metoprolol succinate  [] No (specify contraindication):  [] Bradycardia  [] Hypotension  [] LVD  [] 2nd or 3rd degree heart block  [] Bronchospastic airway disease  [] Decompensated CHF  [] Other (Specify):        Electronically signed by Too Godoy RN on 9/20/2021 at 4:23 PM

## 2021-09-20 NOTE — CARE COORDINATION
Met with pt and spouse Petra at bedside, pt sitting up on edge of bed independently; pt currently on 4 liters, can not recall supplier however, reports he is at his baseline; no nebulizer; would like smaller portable tank (this cm will investigate tomorrow to see who his supplier is); verified PCP and pharmacy; discharge plan is to return home with no needs. Will touch base with pt and wife tomorrow regarding home oxygen. Petra will provide transportation home once pt is medically stable.

## 2021-09-20 NOTE — PROGRESS NOTES
[facility]  Internal Medicine Residency / [address]    Attending Physician Statement  I have discussed the case, including pertinent history and exam findings with the resident and the team.  I have seen and examined the patient and the key elements of the encounter have been performed by me. I agree with the assessment, plan and orders as documented by the resident. Principal Problem:    Acute heart failure with preserved ejection fraction (HFpEF) (HCC)  Active Problems:    DM (diabetes mellitus) (HCC)    NIKO (obstructive sleep apnea)    Persistent atrial fibrillation (HCC)  Resolved Problems:    * No resolved hospital problems. *    Additional dose IV lasix, continue BID. Consider bumex gtt if not getting adequate response tomorrow. 2D echo completed today to reassess EF. CHF nurse consultation and will need CHF clinic follow up on discharge. Eats bean soup and cooks and meds for himself. Discussed low Na diet. Add amlodipine (off ACEi for high K). Remainder of medical problems as per resident note.       Minna Sorenson DO, DO  Internal Medicine Residency Faculty

## 2021-09-20 NOTE — PROGRESS NOTES
bilaterally. Skin:     General: Skin is warm and dry. Capillary Refill: Capillary refill takes less than 2 seconds. Neurological:      General: No focal deficit present. Mental Status: He is alert and oriented to person, place, and time. Cranial Nerves: No cranial nerve deficit.    Psychiatric:         Mood and Affect: Mood normal.     Subject  Pertinent Labs & Imaging Studies   carmela  CBC with Differential:    Lab Results   Component Value Date    WBC 7.0 09/20/2021    RBC 3.19 09/20/2021    HGB 9.8 09/20/2021    HCT 31.2 09/20/2021     09/20/2021    MCV 97.8 09/20/2021    MCH 30.7 09/20/2021    MCHC 31.4 09/20/2021    RDW 14.7 09/20/2021    SEGSPCT 68 04/27/2011    LYMPHOPCT 16.5 09/20/2021    MONOPCT 10.5 09/20/2021    EOSPCT 7 10/05/2010    BASOPCT 1.0 09/20/2021    MONOSABS 0.74 09/20/2021    LYMPHSABS 1.16 09/20/2021    EOSABS 0.31 09/20/2021    BASOSABS 0.07 09/20/2021     BMP:    Lab Results   Component Value Date     09/20/2021    K 5.3 09/20/2021    K 5.5 09/18/2021     09/20/2021    CO2 29 09/20/2021    BUN 31 09/20/2021    LABALBU 3.9 09/18/2021    LABALBU 4.4 10/25/2011    CREATININE 1.3 09/20/2021    CALCIUM 9.3 09/20/2021    GFRAA >60 09/20/2021    LABGLOM 55 09/20/2021    GLUCOSE 171 09/20/2021    GLUCOSE 133 04/18/2012     Hepatic Function Panel:    Lab Results   Component Value Date    ALKPHOS 85 09/18/2021    ALT 20 09/18/2021    AST 18 09/18/2021    PROT 6.9 09/18/2021    BILITOT 0.5 09/18/2021    BILIDIR <0.2 05/25/2017    IBILI see below 05/25/2017    LABALBU 3.9 09/18/2021    LABALBU 4.4 10/25/2011     Magnesium:    Lab Results   Component Value Date    MG 1.8 09/20/2021     Phosphorus:    Lab Results   Component Value Date    PHOS 4.6 09/20/2021     Imaging:     CT CHEST WO CONTRAST   Final Result   Intervally new small to moderate left and trace right pleural effusions.       Mild thickening of secondary interlobular septa with ground-glass attenuation   at some levels, compatible with an element of interstitial pulmonary edema.       Patchy mild subsegmental atelectasis bilaterally, worse within the basal left   lower lobe and inferior lingula.       Multiple essentially stable pulmonary nodules bilaterally.       Intervally new small volume ascites and flank edema.       Mild upper abdominal lymphadenopathy.       RECOMMENDATIONS:   Multiple pulmonary nodules. Most severe: 5 mm solid pulmonary nodule. No   routine follow-up imaging is recommended. These guidelines do not apply to immunocompromised patients and patients with   cancer. Follow up in patients with significant comorbidities as clinically   warranted. For lung cancer screening, adhere to Lung-RADS guidelines. Reference: Radiology. 2017; 284(1):228-43.           XR CHEST (2 VW)   Final Result   Left-sided pleural effusion.       Cardiomegaly.       Discrete patchy infiltrate in the left base questionable on the right. Cannot exclude bilateral pneumonia. Please correlate clinically.           Resident's Assessment and Plan     Chitra Holguin is a 74 y.o. male with a past medical history of HFpEF (EF of 60-65% per echo 10/2021), persistent atrial fibrillation (S/P dual chamber pacemaker), DMII, HTN, HLD, hypothyroidism, restrictive lung disease (on 4L of home O2), and NIKO (does not use home CPAP) that presented to the ED on 9/17 with worsening SOB and anasarca. He was admitted to the house floors for acute exacerbation of HFpEF. 1. Acute Exacerbation of HFpEF (EF 60-65%, echo 10/2021)  - Patient states that he has had worsening SOB and swelling of his abdomen and lower extremities over the past month. - He previously took metoprolol 25 mg daily and Lasix 40mg daily. On 9/13, the patient's lasix dose was increased to 40 mg BID. His symptoms persisted.   - BNP noted at 1600.  - Troponin elevated on admission, likely 2/2 to supply demand mismatch of HF exacerbation.   - On admission, Lasix 40 mg BID IV was started. - Today, the patient states that he feels less fluid overloaded. The patient is negative 1320 mL in the past 24 hours and down 6 lbs since admission.  - Continue to monitor fluid balance with strict I/O's and daily weights. - Patient receiving onetime dose of Lasix 80 mg IV.   - Follow-up on pending echocardiogram.  - Patient should be seen my CHF RN so that appropriate heart failure outpatient follow-up may be established. - Patient may benefit from stress test outpatient in the future. 2. Chronic Respiratory Failure 2/2 Obstructive Sleep Apnea, Restrictive Lung Disease, and Fluid Overload  - Continue 4L of home O2 via NC. Consider weaning if tolerated. - Continue DUONEB, Brovana, and Pulmicort. - Acquire repeat CXR once fluid overload has subsided clinically. - Previous CXR (9/18) noted mild/moderate left sided pleural effusion and trace right sided effusion. On exam today, lung sounds are CTAB. 3. Persistent Atrial Fibrillation S/P Dual Chamber Pacemaker  - On exam, patient appears in NSR.  - Continue Eliquis, metoprolol, and ASA. - EKG noted possible concern for delay with pacing. Consider consult for interrogation in future. - Continue telemetry. 4. Hyperkalemia  - K+ at 6.3 during admission. Today K+ is 5.3.   - Patient is receiving lasix diuresis. Monitor for resolution with BMP.  - Patient takes potassium phos at home. Clarify possible concern for confusion regarding home medication management that may have contributed to elevated K+. - Continue cardiac/potassium-restricted diet. - Receiving insulin sliding scale and Lantus and Duoneb. 5. Hypertension  - History of hypertension with home meds held (HCTZ and ACE-i). - Start amlodipine 5 mg daily and monitor adjustment of BP while on diuretic therapy. 6. Obstructive Sleep Apnea  - Patient continues to refuse CPAP at night due to discomfort. - Patient states that he does not use CPAP machine as recommended at home.     7. Diabetes Mellitus Type II  - Continue Lantus and sliding scale insulin    8. Hx of Hyperlipidemia  - Continue home Lipitor 40 mg daily. 9. Hx of Hypothyroidism  - Continue synthroid 112 mcg per home regimen.     10. Full Code    PT/OT evaluation:   DVT prophylaxis/ GI prophylaxis: Eliquis/Diet  Disposition: Continue current care    Dwayne Galdamez, M4  Attending physician: Dr. Charlynn Bloch, DO

## 2021-09-21 PROBLEM — J96.12 CHRONIC HYPERCAPNIC RESPIRATORY FAILURE (HCC): Status: ACTIVE | Noted: 2021-09-21

## 2021-09-21 LAB
ANION GAP SERPL CALCULATED.3IONS-SCNC: 10 MMOL/L (ref 7–16)
ANION GAP SERPL CALCULATED.3IONS-SCNC: 11 MMOL/L (ref 7–16)
B.E.: 6.7 MMOL/L (ref -3–3)
BASOPHILS ABSOLUTE: 0.05 E9/L (ref 0–0.2)
BASOPHILS RELATIVE PERCENT: 0.7 % (ref 0–2)
BUN BLDV-MCNC: 30 MG/DL (ref 6–23)
BUN BLDV-MCNC: 32 MG/DL (ref 6–23)
CALCIUM SERPL-MCNC: 10 MG/DL (ref 8.6–10.2)
CALCIUM SERPL-MCNC: 10 MG/DL (ref 8.6–10.2)
CHLORIDE BLD-SCNC: 92 MMOL/L (ref 98–107)
CHLORIDE BLD-SCNC: 97 MMOL/L (ref 98–107)
CO2: 33 MMOL/L (ref 22–29)
CO2: 34 MMOL/L (ref 22–29)
COHB: 0.5 % (ref 0–1.5)
CREAT SERPL-MCNC: 1.2 MG/DL (ref 0.7–1.2)
CREAT SERPL-MCNC: 1.3 MG/DL (ref 0.7–1.2)
CRITICAL: ABNORMAL
DATE ANALYZED: ABNORMAL
DATE OF COLLECTION: ABNORMAL
EOSINOPHILS ABSOLUTE: 0.33 E9/L (ref 0.05–0.5)
EOSINOPHILS RELATIVE PERCENT: 4.6 % (ref 0–6)
GFR AFRICAN AMERICAN: >60
GFR AFRICAN AMERICAN: >60
GFR NON-AFRICAN AMERICAN: 55 ML/MIN/1.73
GFR NON-AFRICAN AMERICAN: >60 ML/MIN/1.73
GLUCOSE BLD-MCNC: 141 MG/DL (ref 74–99)
GLUCOSE BLD-MCNC: 198 MG/DL (ref 74–99)
HCO3: 32.2 MMOL/L (ref 22–26)
HCT VFR BLD CALC: 30.4 % (ref 37–54)
HEMOGLOBIN: 9.7 G/DL (ref 12.5–16.5)
HHB: 7.5 % (ref 0–5)
IMMATURE GRANULOCYTES #: 0.06 E9/L
IMMATURE GRANULOCYTES %: 0.8 % (ref 0–5)
LAB: ABNORMAL
LYMPHOCYTES ABSOLUTE: 1.25 E9/L (ref 1.5–4)
LYMPHOCYTES RELATIVE PERCENT: 17.6 % (ref 20–42)
Lab: ABNORMAL
MAGNESIUM: 1.5 MG/DL (ref 1.6–2.6)
MAGNESIUM: 1.6 MG/DL (ref 1.6–2.6)
MCH RBC QN AUTO: 30 PG (ref 26–35)
MCHC RBC AUTO-ENTMCNC: 31.9 % (ref 32–34.5)
MCV RBC AUTO: 94.1 FL (ref 80–99.9)
METER GLUCOSE: 135 MG/DL (ref 74–99)
METER GLUCOSE: 157 MG/DL (ref 74–99)
METER GLUCOSE: 195 MG/DL (ref 74–99)
METER GLUCOSE: 217 MG/DL (ref 74–99)
METHB: 0.3 % (ref 0–1.5)
MODE: ABNORMAL
MONOCYTES ABSOLUTE: 0.73 E9/L (ref 0.1–0.95)
MONOCYTES RELATIVE PERCENT: 10.3 % (ref 2–12)
NEUTROPHILS ABSOLUTE: 4.7 E9/L (ref 1.8–7.3)
NEUTROPHILS RELATIVE PERCENT: 66 % (ref 43–80)
O2 CONTENT: 14.6 ML/DL
O2 SATURATION: 92.4 % (ref 92–98.5)
O2HB: 91.7 % (ref 94–97)
OPERATOR ID: 421
PATIENT TEMP: 37 C
PCO2: 50.6 MMHG (ref 35–45)
PDW BLD-RTO: 14.7 FL (ref 11.5–15)
PH BLOOD GAS: 7.42 (ref 7.35–7.45)
PHOSPHORUS: 5.3 MG/DL (ref 2.5–4.5)
PLATELET # BLD: 226 E9/L (ref 130–450)
PMV BLD AUTO: 9.8 FL (ref 7–12)
PO2: 67.5 MMHG (ref 75–100)
POTASSIUM SERPL-SCNC: 4.4 MMOL/L (ref 3.5–5)
POTASSIUM SERPL-SCNC: 4.4 MMOL/L (ref 3.5–5)
PRO-BNP: 1421 PG/ML (ref 0–125)
RBC # BLD: 3.23 E12/L (ref 3.8–5.8)
SODIUM BLD-SCNC: 136 MMOL/L (ref 132–146)
SODIUM BLD-SCNC: 141 MMOL/L (ref 132–146)
SOURCE, BLOOD GAS: ABNORMAL
THB: 11.3 G/DL (ref 11.5–16.5)
TIME ANALYZED: 1342
WBC # BLD: 7.1 E9/L (ref 4.5–11.5)

## 2021-09-21 PROCEDURE — 83735 ASSAY OF MAGNESIUM: CPT

## 2021-09-21 PROCEDURE — 2580000003 HC RX 258: Performed by: INTERNAL MEDICINE

## 2021-09-21 PROCEDURE — 82962 GLUCOSE BLOOD TEST: CPT

## 2021-09-21 PROCEDURE — 36415 COLL VENOUS BLD VENIPUNCTURE: CPT

## 2021-09-21 PROCEDURE — 51798 US URINE CAPACITY MEASURE: CPT

## 2021-09-21 PROCEDURE — 6360000002 HC RX W HCPCS

## 2021-09-21 PROCEDURE — 2060000000 HC ICU INTERMEDIATE R&B

## 2021-09-21 PROCEDURE — 82805 BLOOD GASES W/O2 SATURATION: CPT

## 2021-09-21 PROCEDURE — 6360000002 HC RX W HCPCS: Performed by: INTERNAL MEDICINE

## 2021-09-21 PROCEDURE — 97165 OT EVAL LOW COMPLEX 30 MIN: CPT

## 2021-09-21 PROCEDURE — 6370000000 HC RX 637 (ALT 250 FOR IP): Performed by: INTERNAL MEDICINE

## 2021-09-21 PROCEDURE — 97530 THERAPEUTIC ACTIVITIES: CPT

## 2021-09-21 PROCEDURE — 80048 BASIC METABOLIC PNL TOTAL CA: CPT

## 2021-09-21 PROCEDURE — 2700000000 HC OXYGEN THERAPY PER DAY

## 2021-09-21 PROCEDURE — 85025 COMPLETE CBC W/AUTO DIFF WBC: CPT

## 2021-09-21 PROCEDURE — 36600 WITHDRAWAL OF ARTERIAL BLOOD: CPT

## 2021-09-21 PROCEDURE — 94660 CPAP INITIATION&MGMT: CPT

## 2021-09-21 PROCEDURE — 99232 SBSQ HOSP IP/OBS MODERATE 35: CPT | Performed by: INTERNAL MEDICINE

## 2021-09-21 PROCEDURE — 83880 ASSAY OF NATRIURETIC PEPTIDE: CPT

## 2021-09-21 PROCEDURE — 84100 ASSAY OF PHOSPHORUS: CPT

## 2021-09-21 PROCEDURE — 97161 PT EVAL LOW COMPLEX 20 MIN: CPT

## 2021-09-21 PROCEDURE — 94640 AIRWAY INHALATION TREATMENT: CPT

## 2021-09-21 RX ORDER — MAGNESIUM SULFATE IN WATER 40 MG/ML
2000 INJECTION, SOLUTION INTRAVENOUS ONCE
Status: COMPLETED | OUTPATIENT
Start: 2021-09-21 | End: 2021-09-21

## 2021-09-21 RX ORDER — MAGNESIUM SULFATE IN WATER 40 MG/ML
2000 INJECTION, SOLUTION INTRAVENOUS ONCE
Status: COMPLETED | OUTPATIENT
Start: 2021-09-21 | End: 2021-09-22

## 2021-09-21 RX ORDER — ACETAZOLAMIDE 250 MG/1
250 TABLET ORAL ONCE
Status: COMPLETED | OUTPATIENT
Start: 2021-09-21 | End: 2021-09-22

## 2021-09-21 RX ADMIN — IPRATROPIUM BROMIDE AND ALBUTEROL SULFATE 1 AMPULE: .5; 2.5 SOLUTION RESPIRATORY (INHALATION) at 16:04

## 2021-09-21 RX ADMIN — BUDESONIDE 250 MCG: 0.25 SUSPENSION RESPIRATORY (INHALATION) at 08:23

## 2021-09-21 RX ADMIN — BUDESONIDE 250 MCG: 0.25 SUSPENSION RESPIRATORY (INHALATION) at 21:26

## 2021-09-21 RX ADMIN — MAGNESIUM SULFATE HEPTAHYDRATE 2000 MG: 40 INJECTION, SOLUTION INTRAVENOUS at 22:05

## 2021-09-21 RX ADMIN — Medication 10 ML: at 09:24

## 2021-09-21 RX ADMIN — ACETAMINOPHEN 650 MG: 325 TABLET ORAL at 21:45

## 2021-09-21 RX ADMIN — IPRATROPIUM BROMIDE AND ALBUTEROL SULFATE 1 AMPULE: .5; 2.5 SOLUTION RESPIRATORY (INHALATION) at 11:39

## 2021-09-21 RX ADMIN — INSULIN LISPRO 1 UNITS: 100 INJECTION, SOLUTION INTRAVENOUS; SUBCUTANEOUS at 21:49

## 2021-09-21 RX ADMIN — ACETAMINOPHEN 650 MG: 325 TABLET ORAL at 00:43

## 2021-09-21 RX ADMIN — APIXABAN 5 MG: 5 TABLET, FILM COATED ORAL at 09:15

## 2021-09-21 RX ADMIN — ASPIRIN 81 MG: 81 TABLET ORAL at 09:14

## 2021-09-21 RX ADMIN — Medication 10 ML: at 22:06

## 2021-09-21 RX ADMIN — INSULIN LISPRO 4 UNITS: 100 INJECTION, SOLUTION INTRAVENOUS; SUBCUTANEOUS at 12:30

## 2021-09-21 RX ADMIN — MAGNESIUM SULFATE HEPTAHYDRATE 2000 MG: 40 INJECTION, SOLUTION INTRAVENOUS at 06:36

## 2021-09-21 RX ADMIN — IPRATROPIUM BROMIDE AND ALBUTEROL SULFATE 1 AMPULE: .5; 2.5 SOLUTION RESPIRATORY (INHALATION) at 08:24

## 2021-09-21 RX ADMIN — IPRATROPIUM BROMIDE AND ALBUTEROL SULFATE 1 AMPULE: .5; 2.5 SOLUTION RESPIRATORY (INHALATION) at 21:26

## 2021-09-21 RX ADMIN — FUROSEMIDE 40 MG: 10 INJECTION, SOLUTION INTRAMUSCULAR; INTRAVENOUS at 16:59

## 2021-09-21 RX ADMIN — METOPROLOL SUCCINATE 25 MG: 25 TABLET, EXTENDED RELEASE ORAL at 09:15

## 2021-09-21 RX ADMIN — ARFORMOTEROL TARTRATE 15 MCG: 15 SOLUTION RESPIRATORY (INHALATION) at 08:23

## 2021-09-21 RX ADMIN — ATORVASTATIN CALCIUM 40 MG: 40 TABLET, FILM COATED ORAL at 09:15

## 2021-09-21 RX ADMIN — INSULIN LISPRO 2 UNITS: 100 INJECTION, SOLUTION INTRAVENOUS; SUBCUTANEOUS at 17:00

## 2021-09-21 RX ADMIN — INSULIN GLARGINE 25 UNITS: 100 INJECTION, SOLUTION SUBCUTANEOUS at 21:49

## 2021-09-21 RX ADMIN — ARFORMOTEROL TARTRATE 15 MCG: 15 SOLUTION RESPIRATORY (INHALATION) at 21:26

## 2021-09-21 RX ADMIN — LEVOTHYROXINE SODIUM 150 MCG: 0.1 TABLET ORAL at 06:37

## 2021-09-21 RX ADMIN — AMLODIPINE BESYLATE 5 MG: 5 TABLET ORAL at 09:23

## 2021-09-21 RX ADMIN — APIXABAN 5 MG: 5 TABLET, FILM COATED ORAL at 21:45

## 2021-09-21 RX ADMIN — FUROSEMIDE 40 MG: 10 INJECTION, SOLUTION INTRAMUSCULAR; INTRAVENOUS at 09:14

## 2021-09-21 ASSESSMENT — PAIN DESCRIPTION - LOCATION
LOCATION: BACK;SHOULDER
LOCATION: BACK;ABDOMEN
LOCATION: BACK

## 2021-09-21 ASSESSMENT — PAIN DESCRIPTION - PAIN TYPE
TYPE: CHRONIC PAIN

## 2021-09-21 ASSESSMENT — PAIN DESCRIPTION - ORIENTATION: ORIENTATION: RIGHT;LEFT

## 2021-09-21 ASSESSMENT — PAIN SCALES - GENERAL
PAINLEVEL_OUTOF10: 0
PAINLEVEL_OUTOF10: 6
PAINLEVEL_OUTOF10: 10

## 2021-09-21 NOTE — PROGRESS NOTES
Bladder scan complete after patient used the restroom and he had less than 20 ml of urine in bladder.

## 2021-09-21 NOTE — PROGRESS NOTES
[facility] Residency Program  Progress Note - House Team 1    Patient:  Brittanie Oates 74 y.o. male MRN: [id number]     Date of Service: 9/21/2021     CC: Shortess of breath  Overnight events: NAEO    Subjective     Patient seen and assessed at the bedside. No acute overnight events. Patient states that he feels better with diuresis. His abdomen and legs feel less edematous. He states that he was able to sleep comfortably throughout the night without any shortness of breath. Denies any pain. Patient complains of right-sided abdominal rash and pruritus for past 24 hours. Denies chest pain, change in vision, numbness or tingling. Objective     Vitals: /73   Pulse 65   Temp 96.4 °F (35.8 °C) (Temporal)   Resp 20   Ht 5' 7.01\" (1.702 m)   Wt 251 lb 2 oz (113.9 kg)   SpO2 94%   BMI 39.32 kg/m²     I & O - 24hr: No intake/output data recorded. Physical Exam  Constitutional:       Appearance: Normal appearance. He is obese. HENT:      Head: Normocephalic and atraumatic. Mouth/Throat:      Mouth: Mucous membranes are moist.   Eyes:      Extraocular Movements: Extraocular movements intact. Pupils: Pupils are equal, round, and reactive to light. Cardiovascular:      Rate and Rhythm: Normal rate and regular rhythm. Abdominal:      General: Bowel sounds are normal. There is distension. Palpations: Abdomen is soft. There is no mass. Tenderness: There is no abdominal tenderness. There is no guarding. Comments: Patient has improving anasarca. Musculoskeletal:         General: No tenderness. Normal range of motion. Cervical back: Normal range of motion and neck supple. Right lower leg: Edema present. Left lower leg: Edema present. Comments: Bilateral +3 pitting edema. Skin:     General: Skin is warm and dry. Capillary Refill: Capillary refill takes less than 2 seconds.    Neurological:      General: No focal deficit present. Mental Status: He is alert and oriented to person, place, and time. Cranial Nerves: No cranial nerve deficit.    Psychiatric:         Mood and Affect: Mood normal.     Subject  Pertinent Labs & Imaging Studies   carmela  CBC with Differential:    Lab Results   Component Value Date    WBC 7.1 09/21/2021    RBC 3.23 09/21/2021    HGB 9.7 09/21/2021    HCT 30.4 09/21/2021     09/21/2021    MCV 94.1 09/21/2021    MCH 30.0 09/21/2021    MCHC 31.9 09/21/2021    RDW 14.7 09/21/2021    SEGSPCT 68 04/27/2011    LYMPHOPCT 17.6 09/21/2021    MONOPCT 10.3 09/21/2021    EOSPCT 7 10/05/2010    BASOPCT 0.7 09/21/2021    MONOSABS 0.73 09/21/2021    LYMPHSABS 1.25 09/21/2021    EOSABS 0.33 09/21/2021    BASOSABS 0.05 09/21/2021     BMP:    Lab Results   Component Value Date     09/21/2021    K 4.4 09/21/2021    K 5.5 09/18/2021    CL 97 09/21/2021    CO2 34 09/21/2021    BUN 32 09/21/2021    LABALBU 3.9 09/18/2021    LABALBU 4.4 10/25/2011    CREATININE 1.3 09/21/2021    CALCIUM 10.0 09/21/2021    GFRAA >60 09/21/2021    LABGLOM 55 09/21/2021    GLUCOSE 141 09/21/2021    GLUCOSE 133 04/18/2012     Magnesium:    Lab Results   Component Value Date    MG 1.5 09/21/2021     Phosphorus:    Lab Results   Component Value Date    PHOS 5.3 09/21/2021     TSH:    Lab Results   Component Value Date    TSH 8.350 09/20/2021     Imaging:    CT CHEST WO CONTRAST   Final Result   Intervally new small to moderate left and trace right pleural effusions.       Mild thickening of secondary interlobular septa with ground-glass attenuation   at some levels, compatible with an element of interstitial pulmonary edema.       Patchy mild subsegmental atelectasis bilaterally, worse within the basal left   lower lobe and inferior lingula.       Multiple essentially stable pulmonary nodules bilaterally.       Intervally new small volume ascites and flank edema.       Mild upper abdominal lymphadenopathy.       RECOMMENDATIONS: Multiple pulmonary nodules. Most severe: 5 mm solid pulmonary nodule. No   routine follow-up imaging is recommended. These guidelines do not apply to immunocompromised patients and patients with   cancer. Follow up in patients with significant comorbidities as clinically   warranted. For lung cancer screening, adhere to Lung-RADS guidelines. Reference: Radiology. 2017; 284(1):228-43.           XR CHEST (2 VW)   Final Result   Left-sided pleural effusion.       Cardiomegaly.       Discrete patchy infiltrate in the left base questionable on the right. Cannot exclude bilateral pneumonia.  Please correlate clinically. Resident's Assessment and Plan     Devon Litten is a 74 y.o. male with a past medical history of HFpEF (EF of 60-65% per echo 10/2021), persistent atrial fibrillation (S/P dual chamber pacemaker), DMII, HTN, HLD, hypothyroidism, restrictive lung disease (on 4L of home O2), and NIKO (does not use home CPAP) that presented to the ED on 9/17 with worsening SOB and anasarca. He was admitted to the house floors for acute exacerbation of HFpEF.     1. Acute Exacerbation of HFpEF (EF 60-65%, echo 10/2021)  - Patient states that he has had worsening SOB and swelling of his abdomen and lower extremities over the past month. - He previously took metoprolol 25 mg daily and Lasix 40mg daily. On 9/13, the patient's lasix dose was increased to 40 mg BID. His symptoms persisted. - BNP noted at 1600. Repeat BNP on 9/21 is 1421.  - Troponin elevated on admission, likely 2/2 to supply demand mismatch of HF exacerbation.   - Today, the patient states that he feels less fluid overloaded. - Echo (9/20/21) limited visualization but shows EF 55-60%, normal left ventricular function, absence of wall abnormalities. - Patient seen by CHF RN and received dietary education and instruction for outpatient follow-up. - Patient received onetime dose of Lasix 80 mg IV on 9/20 alongside scheduled Lasix 40 mg BID.  He remains fluid overloaded. - The patient is negative 480 mL in the past 24 hours and down 14 lbs since admission.   - Acquire bladder scan. - Continue to monitor fluid balance with strict I/O's and daily weights. - Patient may benefit from stress test outpatient in the future. - Nephrology will be consulted regarding further recommendations on diuretic in the case of contraction metabolic alkalosis. Diamox onetime dose is being considered for today. Considering starting oral vs IV bumex for augmented diuresis. 2. Chronic Respiratory Failure 2/2 Obstructive Sleep Apnea, Restrictive Lung Disease, and Fluid Overload  - Previous CXR (9/18) noted mild/moderate left sided pleural effusion and trace right sided effusion.  - On exam today, lung sounds are CTAB. - Continue 4L of home O2 via NC.   - Continue DUONEB, Brovana, and Pulmicort. - Acquire ambulatory pulse oximetry. - Acquire repeat CXR once fluid overload has subsided clinically.      3. Contraction Alkalosis  - Patient has been receiving lasix diuretic therapy for fluid overload. - Today HCO3 noted at 34. Patient may have slight compensatory elevation at baseline given restrictive lung disease.  - Nephrology consulted for further diuresis recommendations. 4. Persistent Atrial Fibrillation S/P Dual Chamber Pacemaker  - On exam, patient appears in NSR.  - EKG noted possible concern for delay with pacing.  - Continue Eliquis, metoprolol, and ASA. - Continue telemetry.     5. Hyperkalemia  - K+ at 6.3 during admission. Today K+ is 4.4.   - Receiving insulin sliding scale/Lantus and Duoneb. - Patient is receiving lasix diuresis. Monitor BMP.  - Patient takes potassium phos at home. Clarify possible concern for confusion regarding home medication management that may have contributed to elevated K+. - Continue cardiac/potassium-restricted diet.     6. Hypertension  - History of hypertension with home meds held (HCTZ and ACE-i).   - Systolic BP has remained <140 on current regimen during past 24 hours. - Continue Amlodipine 5 mg daily. 7. Abdominal Rash (Right-sided)  - Patient complaining of right-sided pruritic rash along right waste line. - Start miconazole ointment.     8. Obstructive Sleep Apnea  - Patient continues to refuse CPAP at night due to discomfort. - Patient states that he does not use CPAP machine as recommended at home.     9. Diabetes Mellitus Type II  - Has remained controlled within 130-175 levels. - Continue Lantus and sliding scale insulin    10. Hx of Hyperlipidemia  - Continue home Lipitor 40 mg daily.     11. Hx of Hypothyroidism  - TSH elevated today at 8.35. May be contributing to current HFpEF exacerbation.  - Synthroid readjusted to 150 mcg daily.     12.  Full Code     PT/OT evaluation: Evaluated 9/21, [facility] 20/24  DVT prophylaxis/ GI prophylaxis: Eliquis/Diet  Disposition: Continue current care     Shahid Sapp, M4  Attending physician: Dr. Lupis Jean-Baptiste DO

## 2021-09-21 NOTE — PROGRESS NOTES
Physical Therapy Initial Assessment     Name: George Pop  : 1955  MRN: [id number]      Date of Service: 2021    Evaluating PT:  Julieth Loya PT, DPT [id number]      Room #:  5548/7650-L  Diagnosis:  CHF with unknown LVEF ([facility] [address] [I50.9]  PMHx/PSHx:    Past Medical History           Date Comments     Type II or unspecified type diabetes mellitus without mention of complication, not stated as uncontrolled [E11.9]       Hypertension [I10]       Osteoarthritis [M19.90]       Hyperlipidemia [E78.5]       Hypothyroidism [E03.9]       Lung nodule [R91.1]       Pinched nerve [G58.9]  Lumbar     NIKO (obstructive sleep apnea) [G47.33] 2013 [facility]     Nocturnal hypoxemia due to obesity [E66.9, G47.36] 2013      Carpal tunnel syndrome [G56.00]       Restrictive lung disease secondary to obesity [J98.4, E66.9] 2016      Atrial fibrillation ([facility] [address] [I48.91]       Sinus node dysfunction ([facility] [address] [I49.5]       Obesity [E66.9]           Past Surgical History         Laterality Date Comments   Colonoscopy [MYO866]   multiple colon polyps   BACK SURGERY [SEG558]   [facility]. Josiephine Lunch nerve in back   EYE SURGERY [facility] cataracts implant   HERNIA REPAIR [SLJ20]   umbilical   Colonoscopy [BDH621]  12 COLONOSCOPY   BACK SURGERY [HTA554]  2017    PACEMAKER PLACEMENT [SHX43]  10/03/2017 [employer] dual chamber    Dr. Kayy Love   Rotator cuff repair [NCW153] Right              Procedure/Surgery:  NA  Precautions:  Falls, O2, [place]  Equipment Needs:  None    SUBJECTIVE:    Pt lives with his wife in a 2 story home with 3 stairs to enter and 1 rail. Pt has a hospital bed on the first floor with half bath available. Pt ambulated with no AD PTA. Pt on home O2 at 4L/min. OBJECTIVE:   Initial Evaluation  Date: 2021 Treatment Date:  NA Short Term/ Long Term   Goals   AM-PAC 6 Clicks 08/10     Was pt agreeable to Eval/treatment? Yes      Does pt have pain?  Reported chronic back pain. Did not quantify. Bed Mobility  Rolling: Independent  Supine to sit: Independent  Sit to supine: Independent  Scooting: Independent  Rolling: Independent  Supine to sit: Independent  Sit to supine: Independent  Scooting: Independent   Transfers Sit to stand: Supervision  Stand to sit: Supervision  Stand pivot: Supervision  Sit to stand: Independent  Stand to sit: Independent  Stand pivot: Independent   Ambulation    40 feet x 2 with no AD with Supervision  >150 feet with no AD Independent   Stair negotiation: ascended and descended  NT  4 steps with 1 rail Mod I   ROM BUE:  WFL  BLE:  WFL     Strength BUE:  4/5  BLE:  4/5  Increase strength 1/3 grade. Balance Sitting EOB:  Independent  Dynamic Standing:  Supervision  Sitting EOB:  Independent  Dynamic Standing:  Independent     Pt is A & O x 3  Sensation:  Pt denies numbness and tingling to extremities  Edema:  None noted, pt reported edema in BLEs \"looks much better\"    Vitals:  Blood Pressure at rest - Blood Pressure post session -   Heart Rate at rest 58 Heart Rate post session 89   SPO2 at rest 98% SPO2 post session 93% after mobility, recovered to 98% within a minute in seated     Therapeutic Exercises:  NT    Patient education  Pt educated on purpose of PT assessment, importance of mobility, safety with mobility, transfers, gait    Patient response to education:   Pt verbalized understanding Pt demonstrated skill Pt requires further education in this area   Yes  Yes with verbal cues Yes/Reinforcement     ASSESSMENT:    Conditions Requiring Skilled Therapeutic Intervention:     [x]Decreased strength     []Decreased ROM  [x]Decreased functional mobility  []Decreased balance   [x]Decreased endurance   []Decreased posture  []Decreased sensation  []Decreased coordination   []Decreased vision  []Decreased safety awareness   [x]Increased pain       Comments:  Patient cleared by RN and agreeable to treatment.  Patient found seated EOB and reported feeling much better since admission. Patient reported needing to use the bathroom and assisted to standing and demonstrated good balance. Patient with slower gait, with equal stride length, steady on his feet with good management of O2 line in the room. Patient assisted self on/off the commode and independent with doffing/donning brief and washing hands. Patient performed gait assessment within the room by choice. SpO2 monitored and documented above. Patient assisted back to seated EOB with call light and tray table in reach. Offered to set up the bedside chair, but patient reported being fine seated EOB. Treatment:  Patient practiced and was instructed in the following treatment:    · Bed mobility: NT   · Transfer training: Verbal/tactile cues to facilitate proper hand placement, technique and safety during sit to stand task. · Gait training: Verbal and tactile cues to facilitate upright posture and safety to complete task. · Therapeutic exercises: As noted above  · Neuromuscular education: NT    Pt's/ family goals   1. To feel better. Prognosis is good for reaching above PT goals. Patient and or family understand(s) diagnosis, prognosis, and plan of care.   Yes     PHYSICAL THERAPY PLAN OF CARE:    PT POC is established based on physician order and patient diagnosis     Referring provider/PT Order:    09/20/21 1430  PT eval and treat Start: 09/20/21 1430, End: 09/20/21 1430, ONE TIME, Standing Count: 1 Occurrences, R      Joelle Ace MD     Diagnosis:  CHF with unknown LVEF ([facility] [address] [I50.9]  Specific instructions for next treatment:  Subsequent PT sessions with focus on improved ambulation distance    Current Treatment Recommendations:     [x] Strengthening to improve independence with functional mobility   [] ROM to improve independence with functional mobility   [x] Balance Training to improve static/dynamic balance and to reduce fall risk  [x] Endurance Training to improve activity tolerance during functional mobility   [x] Transfer Training to improve safety and independence with all functional transfers   [x] Gait Training to improve gait mechanics, endurance and asses need for appropriate assistive device  [x] Stair Training in preparation for safe discharge home and/or into the community   [x] Positioning to prevent skin breakdown and contractures  [x] Safety and Education Training   [x] Patient/Caregiver Education   [] HEP  [] Other     PT long term treatment goals are located in above grid    Frequency of treatments: 2-5x/week x 1-2 weeks. Time in  1025  Time out  1050    Total Treatment Time  15 minutes     Evaluation Time includes thorough review of current medical information, gathering information on past medical history/social history and prior level of function, completion of standardized testing/informal observation of tasks, assessment of data and education on plan of care and goals.     CPT codes:  [x] Low Complexity PT evaluation 56581  [] Moderate Complexity PT evaluation 41888  [] High Complexity PT evaluation 21364  [] PT Re-evaluation 21286  [] Gait training 09970 - minutes  [] Manual therapy 05034 - minutes  [x] Therapeutic activities 78580 15 minutes  [] Therapeutic exercises 08446 - minutes  [] Neuromuscular reeducation 18489 - minutes     Shasta Henry, PT, DPT  License [id number]

## 2021-09-21 NOTE — CONSULTS
(LASIX) injection 40 mg, 40 mg, IntraVENous, BID  glucose (GLUTOSE) 40 % oral gel 15 g, 15 g, Oral, PRN  Allergies:  Patient has no known allergies. Social History:    TOBACCO:   reports that he quit smoking about 16 years ago. His smoking use included cigarettes. He has a 3.50 pack-year smoking history. He has quit using smokeless tobacco.  ETOH:   reports no history of alcohol use.     Family History:       Problem Relation Age of Onset    Cancer Mother     Heart Disease Mother     High Blood Pressure Father     Cancer Brother     High Blood Pressure Brother     Diabetes Brother      REVIEW OF SYSTEMS:    CONSTITUTIONAL:  negative for  fevers and chills  EYES:  negative  HEENT:  negative for  hearing loss and tinnitus  RESPIRATORY:  positive for  dyspnea  CARDIOVASCULAR:  negative for  chest pain  GASTROINTESTINAL:  negative for nausea, vomiting and diarrhea  GENITOURINARY:  negative  INTEGUMENT/BREAST:  negative  HEMATOLOGIC/LYMPHATIC:  negative  ALLERGIC/IMMUNOLOGIC:  negative  ENDOCRINE:  negative  MUSCULOSKELETAL:  negative  NEUROLOGICAL:  negative    PHYSICAL EXAM:      Vitals:    VITALS:  /73   Pulse 65   Temp 96.4 °F (35.8 °C) (Temporal)   Resp 20   Ht 5' 7.01\" (1.702 m)   Wt 251 lb 2 oz (113.9 kg)   SpO2 94%   BMI 39.32 kg/m²   24HR INTAKE/OUTPUT:    Intake/Output Summary (Last 24 hours) at 9/21/2021 1202  Last data filed at 9/20/2021 1812  Gross per 24 hour   Intake 540 ml   Output 1200 ml   Net -660 ml       Constitutional: Patient is awake, in no distress  HEENT: Pupils are equal reactive  Respiratory: Decreased breath sounds at bases  Cardiovascular/Edema: Heart sounds are regular  Gastrointestinal: Abdomen soft  Neurologic: Nonfocal  Skin: No lesions  Other: 1+ edema lower extremities, 1+ abdominal wall edema    DATA:    CBC:   Lab Results   Component Value Date    WBC 7.1 09/21/2021    RBC 3.23 09/21/2021    HGB 9.7 09/21/2021    HCT 30.4 09/21/2021    MCV 94.1 09/21/2021    MCH 30.0 09/21/2021    MCHC 31.9 09/21/2021    RDW 14.7 09/21/2021     09/21/2021    MPV 9.8 09/21/2021     CMP:    Lab Results   Component Value Date     09/21/2021    K 4.4 09/21/2021    K 5.5 09/18/2021    CL 97 09/21/2021    CO2 34 09/21/2021    BUN 32 09/21/2021    CREATININE 1.3 09/21/2021    GFRAA >60 09/21/2021    LABGLOM 55 09/21/2021    GLUCOSE 141 09/21/2021    GLUCOSE 133 04/18/2012    PROT 6.9 09/18/2021    LABALBU 3.9 09/18/2021    LABALBU 4.4 10/25/2011    CALCIUM 10.0 09/21/2021    BILITOT 0.5 09/18/2021    ALKPHOS 85 09/18/2021    AST 18 09/18/2021    ALT 20 09/18/2021     Magnesium:    Lab Results   Component Value Date    MG 1.5 09/21/2021     Phosphorus:    Lab Results   Component Value Date    PHOS 5.3 09/21/2021     Radiology Review:      CT chest without contrast 9/18/2021   Intervally new small to moderate left and trace right pleural effusions.       Mild thickening of secondary interlobular septa with ground-glass attenuation   at some levels, compatible with an element of interstitial pulmonary edema.       Patchy mild subsegmental atelectasis bilaterally, worse within the basal left   lower lobe and inferior lingula.       Multiple essentially stable pulmonary nodules bilaterally.       Intervally new small volume ascites and flank edema.       Mild upper abdominal lymphadenopathy.           Chest x-ray 9/17/2021   Left-sided pleural effusion.       Cardiomegaly.       Discrete patchy infiltrate in the left base questionable on the right. Cannot exclude bilateral pneumonia.  Please correlate clinically.           IMPRESSION/RECOMMENDATIONS:      Briefly Mr. Rocha is a 64-year-old  man with history of HTN, T2DM, history of ischemic ATN for which he required HD treatment for 4 days with adequate renal function recovery in May 2017, atrial fibrillation, NIKO, pacemaker placement, hypothyroidism, hyperlipidemia, status post back surgery x2, who was admitted on 9/17/2021 after he presented to the ER with shortness of breath. He was admitted with the diagnosis of decompensated congestive heart failure. Since admission he has received multiple doses of loop diuretics. His creatinine level on admission was 1.4 mg/dl and has remained relatively stable but his bicarbonate level has increased up to 34. He continues to have significant edema and we are consulted for diuretic management. 1. Mild elevated creatinine level without criteria of RADHA, due to decompensated heart failure, cardiorenal syndrome. 2. HFpEF 55-60%, proBNP 1600> 1421, on furosemide 40 mg IV twice a day  3. Normal pH (pH: 7.422) with respiratory acidosis (PCO2: 55.6) and metabolic alkalosis (contraction alkalosis)  4. HTN, on amlodipine, metoprolol  5. Hypomagnesemia, 2/2 diuretics  ------------------------------------------------------------------  6. Atrial fibrillation, on metoprolol, apixaban  7. NIOK  8. Normocytic anemia    Plan:    · Continue Lasix 40 minutes IV twice a day  · Acetazolamide 250 mg x 1  · Replace magnesium  · Monitor renal function  · Monitor electrolytes  · Monitor bicarbonate level    Thank very much Dr. Valentin العراقي for allowing us to participate in the care of Mr. Belen Wright.

## 2021-09-21 NOTE — PROGRESS NOTES
ABG drawn x 1 from Right Radial. Patient had NormalAllen's Test.  Patient was on 4 liters/min via nasal cannula  at time of puncture. Pressure held for 5. No bleeding or bruising noted at puncture site.   Patient tolerated procedure well      Performed by Mikayla Eastman RCP

## 2021-09-21 NOTE — PROGRESS NOTES
Occupational Therapy    OCCUPATIONAL THERAPY INITIAL EVALUATION    [address] [address] [address]    Date:2021                                             Patient Name: Bibi Mcmahan  MRN: [id number]  : 1955  Room: [address] [place] [address]    Evaluating OT: Alexander Garcia, ROSEANND, OTR/L; #[id number]    Referring Provider: Ugo Freire MD  Specific Provider Orders/Date: OT Evaluation and Treatment, 21    Diagnosis: CHF with unknown LVEF ([facility] [address] [I50.9]  Surgery: none   Pertinent Medical History: A-fib, carpal tunnel syndrome, HLD, HTN, hypothyroidism, lung nodule, nocturnal hypoxemia due to obesity, NIKO, OA, lumbar pinched nerve.  DM type II, sinus node dysfunction, back surgery, pacemaker placement, R rotator cuff repair        Precautions:  Fall Risk, tele, supplemental O2      Assessment of current deficits   [x] Functional mobility   [x]ADLs  [x] Strength               []Cognition   [x] Functional transfers   [x] IADLs         [x] Safety Awareness   [x]Endurance   [x] Fine Coordination              [x] Balance      [] Vision/perception   []Sensation    []Gross Motor Coordination  [] ROM  [] Delirium                   [] Motor Control     OT PLAN OF CARE   OT POC based on physician orders, patient diagnosis and results of clinical assessment    Frequency/Duration: 1-3 days/wk for 2 weeks PRN   Specific OT Treatment Interventions to include:   * Instruction/training on adapted ADL techniques and AE recommendations to increase functional independence within precautions       * Training on energy conservation strategies, correct breathing pattern and techniques to improve independence/tolerance for self-care routine  * Functional transfer/mobility training/DME recommendations for increased independence, safety, and fall prevention  * Patient/Family education to increase follow through with safety techniques and functional independence  * Recommendation of environmental modifications for increased safety with functional transfers/mobility and ADLs  * Cognitive retraining/development of therapeutic activities to improve problem solving, judgement, memory, and attention for increased safety/participation in ADL/IADL tasks  * Therapeutic exercise to improve motor endurance, ROM, and functional strength for ADLs/functional transfers  * Therapeutic activities to facilitate/challenge dynamic balance, stand tolerance for increased safety and independence with ADLs  * Therapeutic activities to facilitate gross/fine motor skills for increased independence with ADLs  * Positioning to improve skin integrity, interaction with environment and functional independence  * Delirium prevention/treatment      Recommended Adaptive Equipment: TBD pending discharge plan     Home Living: Patient lives with his wife in a 2 story house with 3 steps to enter with railing. Bedroom and half bathroom is located on the 1st floor. Patient has full bathroom on 2nd floor. Laundry is located in the basement. Bathroom setup: 1st floor: half bathroom with standard commode; 2nd floor: Tub/shower with grab abrs   Equipment owned: ww, shower chair, reacher, sock-aide  Prior Level of Function: Assist with ADLs with LB dressing from wife. Patient reports I/mod I with all other ADLs. Assist from wife with IADLs. Patient completes cleaning and wife completes laundry. Patient completed functional mobility using no device. Patient reports he uses supplemental O2 at night.   Driving: yes  Occupation: Retired  Leisure: Riding motorcycle and fishing    Pain Level: No pain reported  Cognition: A&O: 4/4; Follows 1-2 step directions   Memory:  Good   Sequencing:  Fair   Problem solving:  Fair  Judgement/safety:  Fair     Functional Assessment:  AM-PAC Daily Activity Raw Score: 17/24   Initial Eval Status  Date: 9/21/21 Treatment Status  Date: STGs = LTGs  Time frame: 10-14 days   Feeding Modified [place]       Grooming Stand by Assist   Modified [place]   Standing at sink   UB Dressing Minimal Assist   Modified [place]    LB Dressing Moderate Assist   Simulated  Stand by Assist   Using AE as needed   Bathing Moderate Assist  Stand by Assist    Toileting Minimal Assist   Modified [place]    Bed Mobility  Supine to sit: NT  Sit to supine:  NT  Supine to sit: Modified [place]   Sit to supine: Modified [place]    Functional Transfers Stand by Assist   Independent    Functional Mobility Stand by Assist   Completed in room without device  Independent   Functional household distance   Balance Sitting:     Static: Mod I    Dynamic: SBA  Standing: SBA     Activity Tolerance Fair     Visual/  Perceptual WFL           Vitals Patient on 4 L O2  Sitting edge of bed  SpO2: 90%  Upon completion of functional activity  SpO2: 97%       Hand Dominance: R handed   AROM (PROM) Strength Additional Info:    RUE  WFL 4-/5  : 4-/5 Fair+ FMC/dexterity noted during ADL tasks    -tremor observed, patient reports present since childhood   LUE WFL 4-/5  : 4-/5 Fair+ FMC/dexterity noted during ADL tasks  -tremor observed, patient reports present since childhood     Hearing: WFL   Sensation:  No acute c/o numbness or tingling   Tone: WFL   Edema: none observed B UE    Comments: Nursing approved OT session. Upon arrival patient agreeable to session with supplemental O2 doffed. Patient agreeable to re-apply supplemental O2. At end of session, patient sitting edge of bed with call light and phone within reach, all lines and tubes intact. Nursing notified. OT evaluation performed with education provided regarding the purpose and benefits of OT session, along with mobility and I/ADL completion. Overall, patient demonstrated decreased activity tolerance limiting independence and safety during completion of ADL/functional transfer/mobility tasks.   Patient would benefit from continued skilled OT to increase safety and independence with completion of ADL/IADL tasks for functional independence and improved quality of life. Rehab Potential: Good  for established goals     Patient / Family Goal: To return home      Patient and/or family were instructed on functional diagnosis, prognosis/goals and OT plan of care. Demonstrated good understanding. Eval Complexity: Low    Time In: 10:15  Time Out: 10:31  Total Treatment Time: n/a    Min Units   OT Eval Low 97165  X  1   OT Eval Medium 34275      OT Eval High 28543       OT Re-Eval [id number]       Therapeutic Ex (33) [id number]       Therapeutic Activities 34904       ADL/Self Care 62722       Orthotic Management 62151       Neuro Re-Ed 33491       Non-Billable Time              Evaluation Time includes thorough review of current medical information, gathering information on past medical history/social history and prior level of function, completion of standardized testing/informal observation of tasks, assessment of data and education on plan of care and goals.             Melanie Magallon, KIRSTIN, OTR/L; #[id number]

## 2021-09-21 NOTE — PROGRESS NOTES
[facility]  Internal Medicine Residency Program  Progress Note - House Team     Patient:  Tressia Fleischer 74 y.o. male MRN: [id number]     Date of Service: 9/21/2021     CC: shortness of breath and increasing weight  Overnight events:  No acute events noted    Subjective     Patient was seen and examined this morning at bedside in no acute distress, lying comfortably in bed on 4L NC. Patient states that he is comfortable and has been urinating a lot recently. Denies any chest pain, shortness of breath, nausea, vomiting. However, patient has been complaining of itchiness on the right groin area. Miconazole cream was started for this. Overnight, patient was stable. No acute evens reported. ressures has been elevated, around 438-720L systolic. Added amlodipine 5mg  to his current regimen. Patient has been refusing to wear CPAP at night as he does not think the mask is comfortable. Objective     Physical Exam:  Vitals: /73   Pulse 65   Temp 96.4 °F (35.8 °C) (Temporal)   Resp 20   Ht 5' 7.01\" (1.702 m)   Wt 251 lb 2 oz (113.9 kg)   SpO2 96%   BMI 39.32 kg/m²     I & O - 24hr: No intake/output data recorded. General Appearance: alert, appears stated age, cooperative and morbidly obese  HEENT:  Head: Normocephalic, no lesions, without obvious abnormality. Eye: Normal external eye, conjunctiva, lids cornea, JELENA. Nose: Normal external nose, mucus membranes and septum. Neck / Thyroid: Supple, no masses, nodes, nodules or enlargement.   Neck: no adenopathy, no carotid bruit, no JVD, supple, symmetrical, trachea midline and thyroid not enlarged, symmetric, no tenderness/mass/nodules  Lung: clear to auscultation bilaterally  Heart: regular rate and rhythm, S1, S2 normal, no murmur, click, rub or gallop  Abdomen: normal findings: bowel sounds normal, no scars, striae, dilated veins, rashes, or lesions and symmetric and abnormal findings:  distended and protuberant, rigid on exam  Extremities:  edema +3 on bilateral lower extremity  Musculokeletal: No joint swelling, no muscle tenderness. ROM normal in all joints of extremities. Neurologic: Mental status: Alert, oriented, thought content appropriate  Skin: (+) erythematous skin rash between RLQ and groin area  Subject  Pertinent Labs & Imaging Studies   carmela  CBC with Differential:    Lab Results   Component Value Date    WBC 7.1 09/21/2021    RBC 3.23 09/21/2021    HGB 9.7 09/21/2021    HCT 30.4 09/21/2021     09/21/2021    MCV 94.1 09/21/2021    MCH 30.0 09/21/2021    MCHC 31.9 09/21/2021    RDW 14.7 09/21/2021    SEGSPCT 68 04/27/2011    LYMPHOPCT 17.6 09/21/2021    MONOPCT 10.3 09/21/2021    EOSPCT 7 10/05/2010    BASOPCT 0.7 09/21/2021    MONOSABS 0.73 09/21/2021    LYMPHSABS 1.25 09/21/2021    EOSABS 0.33 09/21/2021    BASOSABS 0.05 09/21/2021     BMP:    Lab Results   Component Value Date     09/21/2021    K 4.4 09/21/2021    K 5.5 09/18/2021    CL 97 09/21/2021    CO2 34 09/21/2021    BUN 32 09/21/2021    LABALBU 3.9 09/18/2021    LABALBU 4.4 10/25/2011    CREATININE 1.3 09/21/2021    CALCIUM 10.0 09/21/2021    GFRAA >60 09/21/2021    LABGLOM 55 09/21/2021    GLUCOSE 141 09/21/2021    GLUCOSE 133 04/18/2012     Hepatic Function Panel:    Lab Results   Component Value Date    ALKPHOS 85 09/18/2021    ALT 20 09/18/2021    AST 18 09/18/2021    PROT 6.9 09/18/2021    BILITOT 0.5 09/18/2021    BILIDIR <0.2 05/25/2017    IBILI see below 05/25/2017    LABALBU 3.9 09/18/2021    LABALBU 4.4 10/25/2011       CT CHEST WO CONTRAST   Final Result   Intervally new small to moderate left and trace right pleural effusions. Mild thickening of secondary interlobular septa with ground-glass attenuation   at some levels, compatible with an element of interstitial pulmonary edema. Patchy mild subsegmental atelectasis bilaterally, worse within the basal left   lower lobe and inferior lingula.       Multiple essentially stable pulmonary nodules bilaterally. Intervally new small volume ascites and flank edema. Mild upper abdominal lymphadenopathy. RECOMMENDATIONS:   Multiple pulmonary nodules. Most severe: 5 mm solid pulmonary nodule. No   routine follow-up imaging is recommended. These guidelines do not apply to immunocompromised patients and patients with   cancer. Follow up in patients with significant comorbidities as clinically   warranted. For lung cancer screening, adhere to Lung-RADS guidelines. Reference: Radiology. 2017; 284(1):228-43. XR CHEST (2 VW)   Final Result   Left-sided pleural effusion. Cardiomegaly. Discrete patchy infiltrate in the left base questionable on the right. Cannot exclude bilateral pneumonia. Please correlate clinically. Resident's Assessment and Plan     Iraida Miller is a 70-year old male with a past medical history of HFpEF (EF 60-65%), persistent Afib, hypertension, hypothyroidism, IDDM Type 2, sinus node dysfunction s/p dual chamber pacemaker, morbid obesity with restrictive lung disease, presented with shortness of breath and continued weight gain. Patient was seen by his PCP 4 days PTA, his Lasix dose was increased from 40mg daily to 40mg BID; however, was still having increased swelling on both lower extremities and abdomen. He noticed weight gain of at least 20 lbs for the past month. He is on 4L NC at home. CXR showed L-sided pleural effusion, given 1 dose of Lasix in the ED which helped him. He had K level of 6.3 during admission for which he was Ca gluconate; however, his current K levels are still at 5.3-5.5. His current dosage of Lasix was continued. On hospital day 3, patient has been improving. Leg swelling has decreased. On hospital day 4, patient is still volume overloaded despite giving additional dose of 80 mg Lasix.  He is currently being managed for the following:    Assessment and Plan:    Cardiovascular  Acute decompensated HFpEF (EF daily    Pulmonary  Chronic hypoxic respiratory failure 2/2 obesity related restrictive lung disease and acute decompensated HF  Currently at 4L NC at home  Chest CT showed new small/moderate L and trace R-sided pleural effusions with 5mm stable solid pulmonary nodule noted.   PFT in 2020 consistent with moderate restrictive pathology  On breathing treatments with Duoneb, Brovana and Pulmicort  Plan:  Continue breathing treatments  F/u ambulatory pulse oximetry    Left sided pleural effusion 2/2 HFpEF exacerbation  CXR showed L-sided pleural effusion  Chest CT showed new small/moderate L and trace R-sided pleural effusions   Patient is still volume overload despite aggressive diuresis, starting to have contraction alkalosis  Plan:  Continue Lasix 40mg BID  For nephrology consult    Obstructive Sleep Apnea  Non-compliant with CPAP  Plan:  Continue CPAP overnight and during naps    Genitourinary/Renal  Hyperkalemia  K on admission 6.3, treated in ED with Ca gluconate  Was on K supplements  Currently downtrending  Started on K-restricted diet  Plan:  Continue Lasix 40mg BID  Continue K restriction  Monitor BMP daily    Endocrine  Insulin-Dependent Diabetes Mellitus Type 2  Current blood glucose ranges: 135-217  On lantus 25u + LDSSI  Plan:  Continue current insulin regimen  Glucose checks AC/HS  Continue hypoglycemia treatment    History of Hypothyroidism  TSH 6/4/21: 5.94 (elevated)  Repeat TSH 8.350  On levothyroxine 112mcg daily  Plan:  Increase levothyroxine to 150 mcg daily    History of obesity  BMI 41.38    PT/OT evaluation: 20/24 17/24  DVT prophylaxis/ GI prophylaxis: apixaban/on diet  Disposition: continue current care    Juliet Edmonds MD, PGY-1  Attending physician: Dr. Popeye Calles

## 2021-09-21 NOTE — PLAN OF CARE
Problem: Infection:  Goal: Will remain free from infection  Description: Will remain free from infection  9/21/2021 0149 by Jerri Yanez RN  Outcome: Met This Shift  9/20/2021 1857 by Lynnette Walter RN  Outcome: Ongoing     Problem: Safety:  Goal: Free from accidental physical injury  Description: Free from accidental physical injury  9/21/2021 0149 by Jerri Yanez RN  Outcome: Met This Shift  9/20/2021 1857 by Lynnette Walter RN  Outcome: Met This Shift  Goal: Free from intentional harm  Description: Free from intentional harm  9/21/2021 0149 by Jerri Yanez RN  Outcome: Met This Shift  9/20/2021 1857 by Lynnette Walter RN  Outcome: Met This Shift     Problem: Daily Care:  Goal: Daily care needs are met  Description: Daily care needs are met  9/21/2021 0149 by Jerri Yanez RN  Outcome: Met This Shift  9/20/2021 1857 by Lynnette Walter RN  Outcome: Ongoing     Problem: Pain:  Goal: Patient's pain/discomfort is manageable  Description: Patient's pain/discomfort is manageable  9/21/2021 0149 by Jerri Yanez RN  Outcome: Met This Shift  9/20/2021 1857 by Lynnette Walter RN  Outcome: Ongoing  Goal: Pain level will decrease  Description: Pain level will decrease  Outcome: Met This Shift  Goal: Control of acute pain  Description: Control of acute pain  Outcome: Met This Shift  Goal: Control of chronic pain  Description: Control of chronic pain  Outcome: Met This Shift     Problem: Skin Integrity:  Goal: Skin integrity will stabilize  Description: Skin integrity will stabilize  9/21/2021 0149 by Jerri Yanez RN  Outcome: Met This Shift  9/20/2021 1857 by Lynnette Walter RN  Outcome: Ongoing     Problem: Discharge Planning:  Goal: Patients continuum of care needs are met  Description: Patients continuum of care needs are met  9/21/2021 0149 by Jerri Yanez RN  Outcome: Met This Shift  9/20/2021 1857 by Lynnette Walter RN  Outcome: Ongoing     Problem: Falls - Risk of:  Goal: Will remain free from falls  Description: Will remain free from falls  9/21/2021 0149 by Herve Minor RN  Outcome: Met This Shift  9/20/2021 1857 by Lynnette Walter RN  Outcome: Met This Shift  Goal: Absence of physical injury  Description: Absence of physical injury  9/21/2021 0149 by Herve Minor RN  Outcome: Met This Shift  9/20/2021 1857 by Lynnette Walter RN  Outcome: Ongoing     Problem: OXYGENATION/RESPIRATORY FUNCTION  Goal: Patient will maintain patent airway  9/21/2021 0149 by Herve Minor RN  Outcome: Met This Shift  9/20/2021 1857 by Lynnette Walter RN  Outcome: Ongoing     Problem: HEMODYNAMIC STATUS  Goal: Patient has stable vital signs and fluid balance  9/21/2021 0149 by Herve Mnior RN  Outcome: Met This Shift  9/20/2021 1857 by Lynnette Walter RN  Outcome: Ongoing

## 2021-09-22 LAB
ANION GAP SERPL CALCULATED.3IONS-SCNC: 9 MMOL/L (ref 7–16)
BASOPHILS ABSOLUTE: 0.07 E9/L (ref 0–0.2)
BASOPHILS RELATIVE PERCENT: 1 % (ref 0–2)
BLOOD CULTURE, ROUTINE: NORMAL
BUN BLDV-MCNC: 30 MG/DL (ref 6–23)
CALCIUM SERPL-MCNC: 10.3 MG/DL (ref 8.6–10.2)
CHLORIDE BLD-SCNC: 95 MMOL/L (ref 98–107)
CO2: 34 MMOL/L (ref 22–29)
CREAT SERPL-MCNC: 1.2 MG/DL (ref 0.7–1.2)
CULTURE, BLOOD 2: NORMAL
EOSINOPHILS ABSOLUTE: 0.35 E9/L (ref 0.05–0.5)
EOSINOPHILS RELATIVE PERCENT: 5.2 % (ref 0–6)
GFR AFRICAN AMERICAN: >60
GFR NON-AFRICAN AMERICAN: >60 ML/MIN/1.73
GLUCOSE BLD-MCNC: 145 MG/DL (ref 74–99)
HCT VFR BLD CALC: 36.5 % (ref 37–54)
HEMOGLOBIN: 11.6 G/DL (ref 12.5–16.5)
IMMATURE GRANULOCYTES #: 0.09 E9/L
IMMATURE GRANULOCYTES %: 1.3 % (ref 0–5)
LYMPHOCYTES ABSOLUTE: 1.11 E9/L (ref 1.5–4)
LYMPHOCYTES RELATIVE PERCENT: 16.4 % (ref 20–42)
MAGNESIUM: 1.7 MG/DL (ref 1.6–2.6)
MCH RBC QN AUTO: 30.4 PG (ref 26–35)
MCHC RBC AUTO-ENTMCNC: 31.8 % (ref 32–34.5)
MCV RBC AUTO: 95.5 FL (ref 80–99.9)
METER GLUCOSE: 161 MG/DL (ref 74–99)
METER GLUCOSE: 221 MG/DL (ref 74–99)
METER GLUCOSE: 241 MG/DL (ref 74–99)
METER GLUCOSE: 252 MG/DL (ref 74–99)
MONOCYTES ABSOLUTE: 0.6 E9/L (ref 0.1–0.95)
MONOCYTES RELATIVE PERCENT: 8.9 % (ref 2–12)
NEUTROPHILS ABSOLUTE: 4.53 E9/L (ref 1.8–7.3)
NEUTROPHILS RELATIVE PERCENT: 67.2 % (ref 43–80)
PDW BLD-RTO: 14.6 FL (ref 11.5–15)
PHOSPHORUS: 4.8 MG/DL (ref 2.5–4.5)
PLATELET # BLD: 253 E9/L (ref 130–450)
PMV BLD AUTO: 9.9 FL (ref 7–12)
POTASSIUM SERPL-SCNC: 4.2 MMOL/L (ref 3.5–5)
RBC # BLD: 3.82 E12/L (ref 3.8–5.8)
SODIUM BLD-SCNC: 138 MMOL/L (ref 132–146)
WBC # BLD: 6.8 E9/L (ref 4.5–11.5)

## 2021-09-22 PROCEDURE — 6370000000 HC RX 637 (ALT 250 FOR IP): Performed by: NURSE PRACTITIONER

## 2021-09-22 PROCEDURE — 97530 THERAPEUTIC ACTIVITIES: CPT

## 2021-09-22 PROCEDURE — 94640 AIRWAY INHALATION TREATMENT: CPT

## 2021-09-22 PROCEDURE — 6360000002 HC RX W HCPCS: Performed by: INTERNAL MEDICINE

## 2021-09-22 PROCEDURE — 6370000000 HC RX 637 (ALT 250 FOR IP): Performed by: INTERNAL MEDICINE

## 2021-09-22 PROCEDURE — 97535 SELF CARE MNGMENT TRAINING: CPT

## 2021-09-22 PROCEDURE — 94660 CPAP INITIATION&MGMT: CPT

## 2021-09-22 PROCEDURE — 2700000000 HC OXYGEN THERAPY PER DAY

## 2021-09-22 PROCEDURE — 2060000000 HC ICU INTERMEDIATE R&B

## 2021-09-22 PROCEDURE — 85025 COMPLETE CBC W/AUTO DIFF WBC: CPT

## 2021-09-22 PROCEDURE — 99232 SBSQ HOSP IP/OBS MODERATE 35: CPT | Performed by: INTERNAL MEDICINE

## 2021-09-22 PROCEDURE — 82962 GLUCOSE BLOOD TEST: CPT

## 2021-09-22 PROCEDURE — 2580000003 HC RX 258: Performed by: INTERNAL MEDICINE

## 2021-09-22 PROCEDURE — 36415 COLL VENOUS BLD VENIPUNCTURE: CPT

## 2021-09-22 PROCEDURE — 80048 BASIC METABOLIC PNL TOTAL CA: CPT

## 2021-09-22 PROCEDURE — 84100 ASSAY OF PHOSPHORUS: CPT

## 2021-09-22 PROCEDURE — 83735 ASSAY OF MAGNESIUM: CPT

## 2021-09-22 RX ORDER — ACETAZOLAMIDE 250 MG/1
250 TABLET ORAL ONCE
Status: COMPLETED | OUTPATIENT
Start: 2021-09-22 | End: 2021-09-22

## 2021-09-22 RX ORDER — MAGNESIUM SULFATE IN WATER 40 MG/ML
2000 INJECTION, SOLUTION INTRAVENOUS ONCE
Status: COMPLETED | OUTPATIENT
Start: 2021-09-22 | End: 2021-09-22

## 2021-09-22 RX ADMIN — IPRATROPIUM BROMIDE AND ALBUTEROL SULFATE 1 AMPULE: .5; 2.5 SOLUTION RESPIRATORY (INHALATION) at 20:58

## 2021-09-22 RX ADMIN — METOPROLOL SUCCINATE 25 MG: 25 TABLET, EXTENDED RELEASE ORAL at 08:44

## 2021-09-22 RX ADMIN — INSULIN LISPRO 4 UNITS: 100 INJECTION, SOLUTION INTRAVENOUS; SUBCUTANEOUS at 11:26

## 2021-09-22 RX ADMIN — MAGNESIUM SULFATE HEPTAHYDRATE 2000 MG: 40 INJECTION, SOLUTION INTRAVENOUS at 09:50

## 2021-09-22 RX ADMIN — AMLODIPINE BESYLATE 5 MG: 5 TABLET ORAL at 08:44

## 2021-09-22 RX ADMIN — BUDESONIDE 250 MCG: 0.25 SUSPENSION RESPIRATORY (INHALATION) at 09:27

## 2021-09-22 RX ADMIN — IPRATROPIUM BROMIDE AND ALBUTEROL SULFATE 1 AMPULE: .5; 2.5 SOLUTION RESPIRATORY (INHALATION) at 13:59

## 2021-09-22 RX ADMIN — IPRATROPIUM BROMIDE AND ALBUTEROL SULFATE 1 AMPULE: .5; 2.5 SOLUTION RESPIRATORY (INHALATION) at 09:25

## 2021-09-22 RX ADMIN — INSULIN GLARGINE 25 UNITS: 100 INJECTION, SOLUTION SUBCUTANEOUS at 21:11

## 2021-09-22 RX ADMIN — ARFORMOTEROL TARTRATE 15 MCG: 15 SOLUTION RESPIRATORY (INHALATION) at 09:26

## 2021-09-22 RX ADMIN — LEVOTHYROXINE SODIUM 150 MCG: 0.1 TABLET ORAL at 06:14

## 2021-09-22 RX ADMIN — ASPIRIN 81 MG: 81 TABLET ORAL at 08:44

## 2021-09-22 RX ADMIN — FUROSEMIDE 40 MG: 10 INJECTION, SOLUTION INTRAMUSCULAR; INTRAVENOUS at 17:07

## 2021-09-22 RX ADMIN — ACETAMINOPHEN 650 MG: 325 TABLET ORAL at 20:34

## 2021-09-22 RX ADMIN — INSULIN LISPRO 4 UNITS: 100 INJECTION, SOLUTION INTRAVENOUS; SUBCUTANEOUS at 17:04

## 2021-09-22 RX ADMIN — IPRATROPIUM BROMIDE AND ALBUTEROL SULFATE 1 AMPULE: .5; 2.5 SOLUTION RESPIRATORY (INHALATION) at 16:02

## 2021-09-22 RX ADMIN — FUROSEMIDE 40 MG: 10 INJECTION, SOLUTION INTRAMUSCULAR; INTRAVENOUS at 09:50

## 2021-09-22 RX ADMIN — APIXABAN 5 MG: 5 TABLET, FILM COATED ORAL at 21:11

## 2021-09-22 RX ADMIN — Medication 10 ML: at 09:50

## 2021-09-22 RX ADMIN — ATORVASTATIN CALCIUM 40 MG: 40 TABLET, FILM COATED ORAL at 08:44

## 2021-09-22 RX ADMIN — ACETAZOLAMIDE 250 MG: 250 TABLET ORAL at 02:13

## 2021-09-22 RX ADMIN — BUDESONIDE 250 MCG: 0.25 SUSPENSION RESPIRATORY (INHALATION) at 20:57

## 2021-09-22 RX ADMIN — ACETAZOLAMIDE 250 MG: 250 TABLET ORAL at 15:17

## 2021-09-22 RX ADMIN — Medication 10 ML: at 21:18

## 2021-09-22 RX ADMIN — INSULIN LISPRO 2 UNITS: 100 INJECTION, SOLUTION INTRAVENOUS; SUBCUTANEOUS at 06:20

## 2021-09-22 RX ADMIN — APIXABAN 5 MG: 5 TABLET, FILM COATED ORAL at 08:44

## 2021-09-22 RX ADMIN — INSULIN LISPRO 3 UNITS: 100 INJECTION, SOLUTION INTRAVENOUS; SUBCUTANEOUS at 21:11

## 2021-09-22 RX ADMIN — ARFORMOTEROL TARTRATE 15 MCG: 15 SOLUTION RESPIRATORY (INHALATION) at 20:57

## 2021-09-22 ASSESSMENT — PAIN DESCRIPTION - DESCRIPTORS: DESCRIPTORS: ACHING

## 2021-09-22 ASSESSMENT — PAIN DESCRIPTION - LOCATION
LOCATION: BACK
LOCATION: BACK

## 2021-09-22 ASSESSMENT — PAIN DESCRIPTION - PAIN TYPE
TYPE: CHRONIC PAIN
TYPE: CHRONIC PAIN

## 2021-09-22 ASSESSMENT — PAIN SCALES - GENERAL
PAINLEVEL_OUTOF10: 5
PAINLEVEL_OUTOF10: 0
PAINLEVEL_OUTOF10: 8
PAINLEVEL_OUTOF10: 0
PAINLEVEL_OUTOF10: 0

## 2021-09-22 ASSESSMENT — PAIN DESCRIPTION - ORIENTATION: ORIENTATION: LOWER

## 2021-09-22 NOTE — PROGRESS NOTES
OCCUPATIONAL THERAPY TREATMENT NOTE     [address]     Date:2021  Patient Name: Merlin Peraza  MRN: [id number]  : 1955  Room: [address] [place] [address]         Evaluating OT: Toy Maco, OTD, OTR/L; #[id number]     Referring Provider: Lolis Whitfield MD  Specific Provider Orders/Date: OT Evaluation and Treatment, 21     Diagnosis: CHF with unknown LVEF ([facility] [address] [I50.9]  Surgery: none   Pertinent Medical History: A-fib, carpal tunnel syndrome, HLD, HTN, hypothyroidism, lung nodule, nocturnal hypoxemia due to obesity, NIKO, OA, lumbar pinched nerve. DM type II, sinus node dysfunction, back surgery, pacemaker placement, R rotator cuff repair         Precautions:  Fall Risk, tele, supplemental O2        Assessment of current deficits   [x]? Functional mobility             [x]?ADLs           [x]? Strength                  []?Cognition   [x]? Functional transfers           [x]? IADLs         [x]? Safety Awareness   [x]? Endurance   [x]? Fine Coordination              [x]? Balance      []? Vision/perception   []? Sensation     []? Gross Motor Coordination  []? ROM           []?  Delirium                   []? Motor Control      OT PLAN OF CARE   OT POC based on physician orders, patient diagnosis and results of clinical assessment     Frequency/Duration: 1-3 days/wk for 2 weeks PRN   Specific OT Treatment Interventions to include:   * Instruction/training on adapted ADL techniques and AE recommendations to increase functional independence within precautions       * Training on energy conservation strategies, correct breathing pattern and techniques to improve independence/tolerance for self-care routine  * Functional transfer/mobility training/DME recommendations for increased independence, safety, and fall prevention  * Patient/Family education to increase follow through with safety techniques and functional independence  * Recommendation of environmental modifications for increased safety with functional transfers/mobility and ADLs  * Cognitive retraining/development of therapeutic activities to improve problem solving, judgement, memory, and attention for increased safety/participation in ADL/IADL tasks  * Therapeutic exercise to improve motor endurance, ROM, and functional strength for ADLs/functional transfers  * Therapeutic activities to facilitate/challenge dynamic balance, stand tolerance for increased safety and independence with ADLs  * Therapeutic activities to facilitate gross/fine motor skills for increased independence with ADLs  * Positioning to improve skin integrity, interaction with environment and functional independence  * Delirium prevention/treatment        Recommended Adaptive Equipment: TBD pending discharge plan  Pt issued sock aide and LH shoe for LE dressing independence and energy conservation     Home Living: Patient lives with his wife in a 2 story house with 3 steps to enter with railing. Bedroom and half bathroom is located on the 1st floor. Patient has full bathroom on 2nd floor. Laundry is located in the basement. Bathroom setup: 1st floor: half bathroom with standard commode; 2nd floor: Tub/shower with grab abrs   Equipment owned: ww, shower chair, reacher, sock-aide  Prior Level of Function: Assist with ADLs with LB dressing from wife. Patient reports I/mod I with all other ADLs. Assist from wife with IADLs. Patient completes cleaning and wife completes laundry. Patient completed functional mobility using no device. Patient reports he uses supplemental O2 at night.   Driving: yes  Occupation: Retired  Leisure: Riding motorcycle and fishing     Pain Level: No pain reported  Cognition: A&O: 4/4; Follows 1-2 step directions              Memory:  Good              Sequencing:  Fair              Problem solving:  Fair  Judgement/safety:  Fair                Functional Assessment: AM-PAC Daily Activity Raw Score: 17/24    Initial Eval Status  Date: 9/21/21 Treatment Status  Date: 9/22/21 STGs = LTGs  Time frame: 10-14 days   Feeding Modified [place]   n/t     Grooming Stand by Assist  SBA standing at sink  Modified [place]   Standing at sink   UB Dressing Minimal Assist  n/t Modified [place]    LB Dressing Moderate Assist   Simulated MIN A to afshin/doff socks using LH shoe horn and sock aide with pt requiring assist to thread sock aide 2* to instruction;     SBA to afshin pants increased time to complete  Stand by Assist   Using AE as needed   Bathing Moderate Assist N/t pt educated with regards to DME, bathing AE, and ECT's   Stand by Assist    Toileting Minimal Assist  Supervision pt completed all aspects of toileting  Modified [place]    Bed Mobility  Supine to sit: NT  Sit to supine:  NT Supine>sit MN A   Sit>supine n/t   Pt requiring assist to raise UB to sit EOB v/c's for hand placement  Supine to sit: Modified [place]   Sit to supine: Modified [place]    Functional Transfers Stand by Assist  SBA sit<>stand from EOB   SBA sit<>stand from standard toilet  Independent    Functional Mobility Stand by Assist   Completed in room without device SBA less than house hold distances in room  Independent   Functional household distance   Balance Sitting:     Static: Mod I    Dynamic: SBA  Standing: SBA Sitting:   Static: Independent   Dynamic: supervision   Standing: SBA      Activity Tolerance [place] completing light ADL tasks no c/o SOB or fatigue during therapy session      Visual/  Perceptual WFL              Vitals Patient on 4 L O2  Sitting edge of bed  SpO2: 90%  Upon completion of functional activity  SpO2: 97%                 Comments: Upon arrival pt supine in bed, agreeable to therapy session.  Pt educated with regards to bed mobility, functional transfers, functional mobility, safety awareness, energy conservation techniques, DME, bathing AE, LE dressing AE/techniques, toileting/hygiene. At end of session pt seated EOB,  all lines and tubes intact, call light within reach. · Pt has made good  progress towards set goals.    · Continue with current plan of care      Treatment Time In:1320            Treatment Time Out: 1350             Treatment Charges: Mins Units   Ther Ex  72902     Manual Therapy [id number]     Thera Activities 54131 15 1   ADL/Home Mgt 27393 15 1   Neuro Re-ed 15125     Group Therapy      Orthotic manage/training  87300     Non-Billable Time     Total Timed Treatment [address] [place]/L [address]300

## 2021-09-22 NOTE — PROGRESS NOTES
[facility]  Internal Medicine Residency Program  Progress Note - House Team 1    Patient:  Alfredo Rivera 74 y.o. male MRN: [id number]     Date of Service: 9/22/2021     CC: Shortness of breath  Overnight events: Mg 1.6, replaced. Received Diamox 250mg at 0200. Subjective     Patient seen and assessed at the bedside. Overnight, the patient required Mg repletion and received a onetime dose of Diamox 250 mg. He states that he agreed to BiPAP overnight but was only able to tolerate for 2-hours due to discomfort. He then resumed 4L O2 by NC. Today, the patient states that he continues to feel much improved. He denies any shortness of breath or paroxysmal nocturnal dyspnea. He acknowledges that the rash on his right abdomen is improving. Furthermore, he does endorse chronic lower back pain, rated as a 7/10 at the time. He states that this pain is chronic and that he usually sees relief with Advil at home. No additional complaints or concerns per patient. Objective     Vitals: /61   Pulse 85   Temp 97.1 °F (36.2 °C) (Temporal)   Resp 18   Ht 5' 7.01\" (1.702 m)   Wt 243 lb 2 oz (110.3 kg)   SpO2 97%   BMI 38.07 kg/m²     I & O - 24hr: I/O this shift:  In: 120 [P.O.:120]  Out: 600 [Urine:600]   Physical Exam  Constitutional:       General: He is not in acute distress. Appearance: Normal appearance. HENT:      Head: Normocephalic and atraumatic. Mouth/Throat:      Mouth: Mucous membranes are moist.      Pharynx: Oropharynx is clear. Eyes:      Extraocular Movements: Extraocular movements intact. Pupils: Pupils are equal, round, and reactive to light. Cardiovascular:      Rate and Rhythm: Normal rate and regular rhythm. Pulses: Normal pulses. Heart sounds: Normal heart sounds. Pulmonary:      Effort: Pulmonary effort is normal.      Breath sounds: Normal breath sounds. Comments: On 4L O2 via NC.  Bibasilar faint crackles auscultated posteriorly. Abdominal:      General: Bowel sounds are normal. There is distension. Palpations: Abdomen is soft. Comments: Patient continues to experience some abdominal distension but it is improving. Musculoskeletal:         General: Normal range of motion. Cervical back: Normal range of motion and neck supple. Right lower leg: Edema present. Left lower leg: Edema present. Comments: +2 bilateral lower extremity pitting edema. Skin:     General: Skin is warm and dry. Capillary Refill: Capillary refill takes less than 2 seconds. Neurological:      General: No focal deficit present. Mental Status: He is alert and oriented to person, place, and time.    Psychiatric:         Mood and Affect: Mood normal.     Subject  Pertinent Labs & Imaging Studies   carmela  CBC with Differential:    Lab Results   Component Value Date    WBC 6.8 09/22/2021    RBC 3.82 09/22/2021    HGB 11.6 09/22/2021    HCT 36.5 09/22/2021     09/22/2021    MCV 95.5 09/22/2021    MCH 30.4 09/22/2021    MCHC 31.8 09/22/2021    RDW 14.6 09/22/2021    SEGSPCT 68 04/27/2011    LYMPHOPCT 16.4 09/22/2021    MONOPCT 8.9 09/22/2021    EOSPCT 7 10/05/2010    BASOPCT 1.0 09/22/2021    MONOSABS 0.60 09/22/2021    LYMPHSABS 1.11 09/22/2021    EOSABS 0.35 09/22/2021    BASOSABS 0.07 09/22/2021     BMP:    Lab Results   Component Value Date     09/22/2021    K 4.2 09/22/2021    K 5.5 09/18/2021    CL 95 09/22/2021    CO2 34 09/22/2021    BUN 30 09/22/2021    LABALBU 3.9 09/18/2021    LABALBU 4.4 10/25/2011    CREATININE 1.2 09/22/2021    CALCIUM 10.3 09/22/2021    GFRAA >60 09/22/2021    LABGLOM >60 09/22/2021    GLUCOSE 145 09/22/2021    GLUCOSE 133 04/18/2012     Magnesium:    Lab Results   Component Value Date    MG 1.7 09/22/2021     Phosphorus:    Lab Results   Component Value Date    PHOS 4.8 09/22/2021     ABG:    Lab Results   Component Value Date    PH 7.422 09/21/2021    PCO2 50.6 09/21/2021    PO2 67.5 09/21/2021    HCO3 32.2 09/21/2021    BE 6.7 09/21/2021    O2SAT 92.4 09/21/2021       Resident's Assessment and Plan     Carole Sandoval a 74 y. o. male with a past medical history of HFpEF (EF of 60-65% per echo 10/2021), persistent atrial fibrillation (S/P dual chamber pacemaker), DMII, HTN, HLD, hypothyroidism, restrictive lung disease (on 4L of home O2), and NIKO (does not use home CPAP) that presented to the ED on 9/17 with worsening SOB and anasarca. He was admitted to the house floors for acute exacerbation of HFpEF.     1. Acute Exacerbation of HFpEF (EF 60-65%, echo 10/2021)  - Patient states that he has had worsening SOB and swelling of his abdomen and lower extremities over the past month. - He previously took metoprolol 25 mg daily and Lasix 40mg daily. On 9/13, the patient's lasix dose was increased to 40 mg BID. His symptoms persisted. - BNP noted at 1600. Repeat BNP on 9/21 is 1421.  - Troponin elevated on admission, likely 2/2 to supply demand mismatch of HF exacerbation.   - Today, the patient states that he feels less fluid overloaded. - Echo (9/20/21) limited visualization but shows EF 55-60%, normal left ventricular function, absence of wall abnormalities. - Patient seen by CHF RN and received dietary education and instruction for outpatient follow-up. - Patient received onetime dose of Lasix 80 mg IV on 9/20 alongside scheduled Lasix 40 mg. Patient remained fluid overloaded. Nephrology consulted and recommended Diamox 250 mg onetime dose and continuation of lasix 40 mg BID IV.  - Continue Lasix 40 mg BID IV per nephrology. Patient appears to be improving.  - The patient is negative 1650 mL in the past 24 hours and down 22 lbs since admission.   - Bladder scan yesterday revealed residual of 20 mL. - Continue to monitor fluid balance with strict I/O's and daily weights. - Patient may benefit from stress test outpatient in the future.    - Given limited visualization on echo, patient may benefit from right heart catheterization for right ventricular function assessment in the future.     2. Chronic Respiratory Failure 2/2 Obstructive Sleep Apnea, Restrictive Lung Disease, and Fluid Overload  - Previous CXR (9/18) noted mild/moderate left sided pleural effusion and trace right sided effusion.  - On exam today, lung sounds are CTAB. - Continue 4L of home O2 via NC.   - Continue DUONEB, Brovana, and Pulmicort. - On ambulatory pulse oximetry, patient ambulated 20ft and dropped to SpO2 of 89% before returning back to 96% at rest with O2.  - Acquire repeat CXR once fluid overload has subsided clinically.      3. Contraction Alkalosis  - Patient has been receiving lasix diuretic therapy for fluid overload. - Today HCO3 noted at 33. Patient may have slight compensatory elevation at baseline given restrictive lung disease.  - Monitor for resolution Following Diamox therapy.     4. Persistent Atrial Fibrillation S/P Dual Chamber Pacemaker  - On exam, patient appears in NSR.  - EKG noted possible concern for delay with pacing.  - Continue Eliquis, metoprolol, and ASA. - Continue telemetry.     5. Hyperkalemia  - K+ at 6.3 during admission. Today K+ is 4.4.   - Receiving insulin sliding scale/Lantus and Duoneb. - Patient is receiving lasix diuresis. Monitor BMP.  - Patient takes potassium phos at home. Clarify possible concern for confusion regarding home medication management that may have contributed to elevated K+. - Continue cardiac/potassium-restricted diet.     6. Hypertension  - History of hypertension with home meds held (HCTZ and ACE-i). - Systolic BP has remained within target range. - Continue Amlodipine 5 mg daily.     7. Abdominal Rash (Right-sided)  - Patient complaining of right-sided pruritic rash along right waste line. - Today, pruritus is improving.  - Continue miconazole ointment.     8. Obstructive Sleep Apnea  - Patient continues to refuse CPAP at night due to discomfort.   - Patient states that he does not use CPAP machine as recommended at home. - Refused BiPAP after 2 hours overnight due to discomfort.     9. Diabetes Mellitus Type II  - Has remained relatively controlled with several episodes of BG >200.  - Continue Lantus and sliding scale insulin     10. Hx of Hyperlipidemia  - Continue home Lipitor 40 mg daily.     11. Hx of Hypothyroidism  - TSH elevated today at 8.35. May be contributing to current HFpEF exacerbation.  - Synthroid readjusted to 150 mcg daily.     12.  Full Code     PT/OT evaluation: Evaluated 9/21, [facility] 20/24  DVT prophylaxis/ GI prophylaxis: Eliquis/Diet  Disposition: Continue current care     Ricardo King  Attending physician: Devi Thomas

## 2021-09-22 NOTE — PROGRESS NOTES
Date: 9/21/2021    Time: 9:36 PM    Patient Placed On BIPAP/CPAP/ Non-Invasive Ventilation? yes    If no must comment. Facial area red/color change? no            BIPAP/CPAP skin barrier?  yes   Skin barrier type: mepilexlite    Comments:    CPAP +5/ 4L bleed    Jose Guadalupe Lamp

## 2021-09-22 NOTE — PLAN OF CARE
Problem: Infection:  Goal: Will remain free from infection  Description: Will remain free from infection  9/22/2021 0205 by Josefa Helton RN  Outcome: Met This Shift  9/21/2021 1833 by Sydnie Camacho RN  Outcome: Met This Shift  9/21/2021 1832 by Sydnie Camacho RN  Outcome: Met This Shift  9/21/2021 1832 by Sydnie Camacho, KANDI  Outcome: Met This Shift     Problem: Safety:  Goal: Free from accidental physical injury  Description: Free from accidental physical injury  9/22/2021 0205 by Josefa Helton RN  Outcome: Met This Shift  9/21/2021 1833 by Sydnie Camacho RN  Outcome: Met This Shift  9/21/2021 1832 by Sydnie Camacho RN  Outcome: Ongoing  9/21/2021 1832 by Sydnie Camacho RN  Outcome: Ongoing  Goal: Free from intentional harm  Description: Free from intentional harm  9/22/2021 0205 by Josefa Helton RN  Outcome: Met This Shift  9/21/2021 1833 by Sydnie Camacho RN  Outcome: Met This Shift  9/21/2021 1832 by Sydnie Camacho, KANDI  Outcome: Ongoing  9/21/2021 1832 by Sydnie Camacho RN  Outcome: Ongoing     Problem: Daily Care:  Goal: Daily care needs are met  Description: Daily care needs are met  9/22/2021 0205 by Josefa Helton RN  Outcome: Met This Shift  9/21/2021 1833 by Sydnie Camacho RN  Outcome: Met This Shift  9/21/2021 1832 by Sydnie Camacho, KANDI  Outcome: Ongoing  9/21/2021 1832 by Sydnie Camacho RN  Outcome: Ongoing     Problem: Pain:  Goal: Patient's pain/discomfort is manageable  Description: Patient's pain/discomfort is manageable  9/22/2021 0205 by Josefa Helton RN  Outcome: Met This Shift  9/21/2021 1833 by Sydnie Camacho RN  Outcome: Met This Shift  9/21/2021 1832 by Sydnie Camacho, KANDI  Outcome: Ongoing  9/21/2021 1832 by Sydnie Camacho RN  Outcome: Ongoing  Goal: Pain level will decrease  Description: Pain level will decrease  9/22/2021 0205 by Josefa Helton RN  Outcome: Met This Shift  9/21/2021 1833 by Sydnie Camacho RN  Outcome: Met This Shift  9/21/2021 1832 by Sydnie Camacho, RN  Outcome: Ongoing  9/21/2021 1832 by Ebonie Marie RN  Outcome: Ongoing  Goal: Control of acute pain  Description: Control of acute pain  9/22/2021 0205 by Chino Wang RN  Outcome: Met This Shift  9/21/2021 1833 by Ebonie Marie RN  Outcome: Met This Shift  9/21/2021 1832 by Ebonie Marie RN  Outcome: Ongoing  9/21/2021 1832 by Ebonie Marie RN  Outcome: Ongoing  Goal: Control of chronic pain  Description: Control of chronic pain  9/22/2021 0205 by Chino Wang RN  Outcome: Met This Shift  9/21/2021 1833 by Ebonie Marie RN  Outcome: Met This Shift  9/21/2021 1832 by Ebonie Marie RN  Outcome: Ongoing  9/21/2021 1832 by Ebonie Marie RN  Outcome: Ongoing     Problem: Skin Integrity:  Goal: Skin integrity will stabilize  Description: Skin integrity will stabilize  9/22/2021 0205 by Chino Wang RN  Outcome: Met This Shift  9/21/2021 1833 by Ebonie Marie RN  Outcome: Met This Shift  9/21/2021 1832 by Ebonie Marie RN  Outcome: Ongoing  9/21/2021 1832 by Ebonie Marie RN  Outcome: Ongoing     Problem: Discharge Planning:  Goal: Patients continuum of care needs are met  Description: Patients continuum of care needs are met  9/22/2021 0205 by Chino Wang RN  Outcome: Met This Shift  9/21/2021 1833 by Ebonie Marie RN  Outcome: Met This Shift  9/21/2021 1832 by Ebonie Marie RN  Outcome: Ongoing  9/21/2021 1832 by Ebonie Marie RN  Outcome: Ongoing     Problem: Falls - Risk of:  Goal: Will remain free from falls  Description: Will remain free from falls  9/22/2021 0205 by Chino Wang RN  Outcome: Met This Shift  9/21/2021 1833 by Ebonie Marie RN  Outcome: Met This Shift  9/21/2021 1832 by Ebonie Marie RN  Outcome: Ongoing  9/21/2021 1832 by Ebonie Marie RN  Outcome: Ongoing  Goal: Absence of physical injury  Description: Absence of physical injury  9/22/2021 0205 by Chino Wang RN  Outcome: Met This Shift  9/21/2021 1833 by Ebonie Marie RN  Outcome: Met This

## 2021-09-22 NOTE — CARE COORDINATION
Spoke with [facility] at [place], pt oxygen supplier, per [facility] pt was admitted prior to having his OCD evaluation for portable concentrator. Per [facility], pt does have portable oxygen, and pt will need to call them upon discharge to set appointment back up for OCD evaluation. I relayed this information to pt at bedside and provided phone number. Pt remains on IV lasix 40 mg BID; 97% on 4 liters via nasal cannula, anticipate discharge within the next 24-48 hours. Discharge plan remains home with wife Ash Pollard providing transportation. Per pt request I left Petra a detailed VM on home phone concerning portable oxygen concentrator.

## 2021-09-22 NOTE — PROGRESS NOTES
Department of Internal Medicine  Nephrology Attending Consult Note    Events reviewed. SUBJECTIVE: We are following Mr. Rocha for diuretic management. Reports no complaints.     PHYSICAL EXAM:      Vitals:    VITALS:  /61   Pulse 85   Temp 97.1 °F (36.2 °C) (Temporal)   Resp 18   Ht 5' 7.01\" (1.702 m)   Wt 243 lb 2 oz (110.3 kg)   SpO2 97%   BMI 38.07 kg/m²   24HR INTAKE/OUTPUT:      Intake/Output Summary (Last 24 hours) at 9/22/2021 1204  Last data filed at 9/22/2021 1127  Gross per 24 hour   Intake 120 ml   Output 1650 ml   Net -1530 ml       Constitutional: Patient is awake, in no distress  HEENT: Pupils are equal reactive  Respiratory: Decreased breath sounds at bases  Cardiovascular/Edema: Heart sounds are regular  Gastrointestinal: Abdomen soft  Neurologic: Nonfocal  Skin: No lesions  Other: 1+ edema lower extremities, 1+ abdominal wall edema    Scheduled Meds:   acetaZOLAMIDE  250 mg Oral Once    levothyroxine  150 mcg Oral Daily    amLODIPine  5 mg Oral Daily    insulin lispro  0-12 Units SubCUTAneous TID WC    insulin lispro  0-6 Units SubCUTAneous Nightly    apixaban  5 mg Oral BID    aspirin  81 mg Oral Daily    atorvastatin  40 mg Oral Daily    metoprolol succinate  25 mg Oral Daily    sodium chloride flush  10 mL IntraVENous 2 times per day    insulin glargine  25 Units SubCUTAneous Nightly    ipratropium-albuterol  1 ampule Inhalation Q4H WA    budesonide  250 mcg Nebulization BID    Arformoterol Tartrate  15 mcg Nebulization BID    furosemide  40 mg IntraVENous BID     Continuous Infusions:   dextrose      sodium chloride       PRN Meds:.miconazole, glucose, dextrose, glucagon (rDNA), dextrose, sodium chloride flush, sodium chloride, ondansetron **OR** ondansetron, polyethylene glycol, acetaminophen **OR** acetaminophen, glucose, glucose    DATA:    CBC:   Lab Results   Component Value Date    WBC 6.8 09/22/2021    RBC 3.82 09/22/2021    HGB 11.6 09/22/2021    HCT 36.5 09/22/2021    MCV 95.5 09/22/2021    MCH 30.4 09/22/2021    MCHC 31.8 09/22/2021    RDW 14.6 09/22/2021     09/22/2021    MPV 9.9 09/22/2021     CMP:    Lab Results   Component Value Date     09/22/2021    K 4.2 09/22/2021    K 5.5 09/18/2021    CL 95 09/22/2021    CO2 34 09/22/2021    BUN 30 09/22/2021    CREATININE 1.2 09/22/2021    GFRAA >60 09/22/2021    LABGLOM >60 09/22/2021    GLUCOSE 145 09/22/2021    GLUCOSE 133 04/18/2012    PROT 6.9 09/18/2021    LABALBU 3.9 09/18/2021    LABALBU 4.4 10/25/2011    CALCIUM 10.3 09/22/2021    BILITOT 0.5 09/18/2021    ALKPHOS 85 09/18/2021    AST 18 09/18/2021    ALT 20 09/18/2021     Magnesium:    Lab Results   Component Value Date    MG 1.7 09/22/2021     Phosphorus:    Lab Results   Component Value Date    PHOS 4.8 09/22/2021     Radiology Review:      CT chest without contrast 9/18/2021   Intervally new small to moderate left and trace right pleural effusions.       Mild thickening of secondary interlobular septa with ground-glass attenuation   at some levels, compatible with an element of interstitial pulmonary edema.       Patchy mild subsegmental atelectasis bilaterally, worse within the basal left   lower lobe and inferior lingula.       Multiple essentially stable pulmonary nodules bilaterally.       Intervally new small volume ascites and flank edema.       Mild upper abdominal lymphadenopathy.           Chest x-ray 9/17/2021   Left-sided pleural effusion.       Cardiomegaly.       Discrete patchy infiltrate in the left base questionable on the right. Cannot exclude bilateral pneumonia.  Please correlate clinically.           BRIEF SUMMARY OF INITIAL CONSULT:    Briefly Mr. Rocha is a 67-year-old  man with history of HTN, T2DM, history of ischemic ATN for which he required HD treatment for 4 days with adequate renal function recovery in May 2017, atrial fibrillation, NIKO, pacemaker placement, hypothyroidism, hyperlipidemia, status post back surgery x2, who was admitted on 9/17/2021 after he presented to the ER with shortness of breath. He was admitted with the diagnosis of decompensated congestive heart failure. Since admission he has received multiple doses of loop diuretics. His creatinine level on admission was 1.4 mg/dl and has remained relatively stable but his bicarbonate level has increased up to 34. He continues to have significant edema and we are consulted for diuretic management. Problems resolved:    · Mild elevated creatinine level without criteria of RADHA, due to decompensated heart failure, cardiorenal syndrome. IMPRESSION/RECOMMENDATIONS:        1. HFpEF 55-60%, proBNP 1600> 1421, on furosemide 40 mg IV twice a day and acetazolamide. 2. Normal pH (pH: 7.422) with respiratory acidosis (PCO2: 21.5) and metabolic alkalosis (contraction alkalosis). Bicarbonate levels are stable  3. HTN, on amlodipine, metoprolol  4. Hypomagnesemia, 2/2 diuretics  ------------------------------------------------------------------  5. Atrial fibrillation, on metoprolol, apixaban  6. NIKO  7.  Normocytic anemia    Plan:    · Continue Lasix 40 minutes IV twice a day  · Acetazolamide 250 mg x 1  · Replace magnesium  · Continue to monitor renal function  · Continue to monitor electrolytes  · Continue to monitor bicarbonate level

## 2021-09-22 NOTE — PLAN OF CARE
Problem: Infection:  Goal: Will remain free from infection  Description: Will remain free from infection  9/22/2021 0205 by Fernie Royal RN  Outcome: Met This Shift     Problem: Safety:  Goal: Free from accidental physical injury  Description: Free from accidental physical injury  9/22/2021 0205 by Fernie Royal RN  Outcome: Met This Shift  Goal: Free from intentional harm  Description: Free from intentional harm  9/22/2021 0205 by Fernie Royal RN  Outcome: Met This Shift     Problem: Daily Care:  Goal: Daily care needs are met  Description: Daily care needs are met  9/22/2021 0205 by Fernie Royal RN  Outcome: Met This Shift     Problem: Pain:  Goal: Patient's pain/discomfort is manageable  Description: Patient's pain/discomfort is manageable  9/22/2021 0205 by Fernie Royal RN  Outcome: Met This Shift  Goal: Pain level will decrease  Description: Pain level will decrease  9/22/2021 0205 by Fernie Royal RN  Outcome: Met This Shift  Goal: Control of acute pain  Description: Control of acute pain  9/22/2021 0205 by Fernie Royal RN  Outcome: Met This Shift  Goal: Control of chronic pain  Description: Control of chronic pain  9/22/2021 0205 by Fernie Royal RN  Outcome: Met This Shift     Problem: Skin Integrity:  Goal: Skin integrity will stabilize  Description: Skin integrity will stabilize  9/22/2021 0205 by Fernie Royal RN  Outcome: Met This Shift     Problem: Discharge Planning:  Goal: Patients continuum of care needs are met  Description: Patients continuum of care needs are met  9/22/2021 0205 by Fernie Royal RN  Outcome: Met This Shift     Problem: Falls - Risk of:  Goal: Will remain free from falls  Description: Will remain free from falls  9/22/2021 0205 by Fernie Royal RN  Outcome: Met This Shift  Goal: Absence of physical injury  Description: Absence of physical injury  9/22/2021 0205 by Fernie Royal RN  Outcome: Met This Shift     Problem: OXYGENATION/RESPIRATORY FUNCTION  Goal: Patient will maintain patent airway  9/22/2021 0205 by Jose Mercado RN  Outcome: Met This Shift     Problem: HEMODYNAMIC STATUS  Goal: Patient has stable vital signs and fluid balance  9/22/2021 0205 by Jose Mercado RN  Outcome: Met This Shift

## 2021-09-22 NOTE — PROGRESS NOTES
[facility]  Internal Medicine Residency Program  Progress Note - House Team     Patient:  Walter Patel 74 y.o. male MRN: [id number]     Date of Service: 9/22/2021     CC: shortness of breath and increasing weight  Overnight events:  No acute events noted    Subjective     Patient was seen and examined this morning at bedside in no acute distress, lying comfortably in bed on 4L NC. Patient states that he is comfortable and has felt so much better. He reports that he has urinated ~5L yesterday. Denies any chest pain, shortness of breath, nausea, vomiting. Overnight, patient was stable. No acute events reported. Patient wore his BiPAP for 2 hours last night, still feels uncomfortable wearing the mask. Objective     Physical Exam:  Vitals: BP (!) 108/54   Pulse 62   Temp 96.1 °F (35.6 °C) (Temporal)   Resp 21   Ht 5' 7.01\" (1.702 m)   Wt 251 lb 2 oz (113.9 kg)   SpO2 93%   BMI 39.32 kg/m²     I & O - 24hr: No intake/output data recorded. General Appearance: alert, appears stated age, cooperative and morbidly obese  HEENT:  Head: Normocephalic, no lesions, without obvious abnormality. Eye: Normal external eye, conjunctiva, lids cornea, JELENA. Nose: Normal external nose, mucus membranes and septum. Neck / Thyroid: Supple, no masses, nodes, nodules or enlargement. Neck: no adenopathy, no carotid bruit, no JVD, supple, symmetrical, trachea midline and thyroid not enlarged, symmetric, no tenderness/mass/nodules  Lung: clear to auscultation bilaterally  Heart: regular rate and rhythm, S1, S2 normal, no murmur, click, rub or gallop  Abdomen: normal findings: bowel sounds normal, no bruits heard, no organomegaly, soft, non-tender and symmetric and abnormal findings:  protuberant  Extremities:  edema +2 on bilateral lower extremity  Musculokeletal: No joint swelling, no muscle tenderness. ROM normal in all joints of extremities.     Neurologic: Mental status: Alert, oriented, thought content appropriate  Skin: (+) erythematous skin rash between RLQ and groin area  Subject  Pertinent Labs & Imaging Studies   carmela  CBC with Differential:    Lab Results   Component Value Date    WBC 7.1 09/21/2021    RBC 3.23 09/21/2021    HGB 9.7 09/21/2021    HCT 30.4 09/21/2021     09/21/2021    MCV 94.1 09/21/2021    MCH 30.0 09/21/2021    MCHC 31.9 09/21/2021    RDW 14.7 09/21/2021    SEGSPCT 68 04/27/2011    LYMPHOPCT 17.6 09/21/2021    MONOPCT 10.3 09/21/2021    EOSPCT 7 10/05/2010    BASOPCT 0.7 09/21/2021    MONOSABS 0.73 09/21/2021    LYMPHSABS 1.25 09/21/2021    EOSABS 0.33 09/21/2021    BASOSABS 0.05 09/21/2021     BMP:    Lab Results   Component Value Date     09/21/2021    K 4.4 09/21/2021    K 5.5 09/18/2021    CL 92 09/21/2021    CO2 33 09/21/2021    BUN 30 09/21/2021    LABALBU 3.9 09/18/2021    LABALBU 4.4 10/25/2011    CREATININE 1.2 09/21/2021    CALCIUM 10.0 09/21/2021    GFRAA >60 09/21/2021    LABGLOM >60 09/21/2021    GLUCOSE 198 09/21/2021    GLUCOSE 133 04/18/2012     Hepatic Function Panel:    Lab Results   Component Value Date    ALKPHOS 85 09/18/2021    ALT 20 09/18/2021    AST 18 09/18/2021    PROT 6.9 09/18/2021    BILITOT 0.5 09/18/2021    BILIDIR <0.2 05/25/2017    IBILI see below 05/25/2017    LABALBU 3.9 09/18/2021    LABALBU 4.4 10/25/2011       CT CHEST WO CONTRAST   Final Result   Intervally new small to moderate left and trace right pleural effusions. Mild thickening of secondary interlobular septa with ground-glass attenuation   at some levels, compatible with an element of interstitial pulmonary edema. Patchy mild subsegmental atelectasis bilaterally, worse within the basal left   lower lobe and inferior lingula. Multiple essentially stable pulmonary nodules bilaterally. Intervally new small volume ascites and flank edema. Mild upper abdominal lymphadenopathy. RECOMMENDATIONS:   Multiple pulmonary nodules.  Most severe: 5 mm solid pulmonary nodule. No   routine follow-up imaging is recommended. These guidelines do not apply to immunocompromised patients and patients with   cancer. Follow up in patients with significant comorbidities as clinically   warranted. For lung cancer screening, adhere to Lung-RADS guidelines. Reference: Radiology. 2017; 284(1):228-43. XR CHEST (2 VW)   Final Result   Left-sided pleural effusion. Cardiomegaly. Discrete patchy infiltrate in the left base questionable on the right. Cannot exclude bilateral pneumonia. Please correlate clinically. Resident's Assessment and Plan     Bay Jerome is a 70-year old male with a past medical history of HFpEF (EF 60-65%), persistent Afib, hypertension, hypothyroidism, IDDM Type 2, sinus node dysfunction s/p dual chamber pacemaker, morbid obesity with restrictive lung disease, presented with shortness of breath and continued weight gain. Patient was seen by his PCP 4 days PTA, his Lasix dose was increased from 40mg daily to 40mg BID; however, was still having increased swelling on both lower extremities and abdomen. He noticed weight gain of at least 20 lbs for the past month. He is on 4L NC at home. CXR showed L-sided pleural effusion, given 1 dose of Lasix in the ED which helped him. He had K level of 6.3 during admission for which he was Ca gluconate; however, his current K levels are still at 5.3-5.5. His current dosage of Lasix was continued. On hospital day 3, patient has been improving. Leg swelling has decreased. On hospital day 4, patient is still volume overloaded despite giving additional dose of 80 mg Lasix. Nephrology was consulted for diuretic management. Nephrology recommended to continue Lasix 40mg BID and gave 1 dose of acetazolamide. Patient has been clinically improving, with noticeable improvement in leg swelling and anasarca.   He is currently being managed for the following:    Assessment and Plan:    Cardiovascular  Acute decompensated HFpEF (EF 60-65%)  Presented with +4 pitting edema in LE to his knees and anasarca  Seen by PCP on 9/13/21, increased his Lasix dose from 40mg daily to 40mg BID, however no improvement on swelling noted  CT chest showed bilateral pleural effusion L>R with interstitial pulmonary edema and new small volume ascites & flank edema  proBNP 1600 -> 1421   Echo done in 10/2020 showed normal LV and RV size and systolic function, EF 26-28%, indeterminate diastolic function, moderate LV concentric hypertrophy, abnormal LV septal motion consistent with paced rhythm. Repeat echo done 9/20 showed LV normal size, no wall abnormalities, normal LV ejection fraction, EF 55 to 60%. However study is limited  Seen by nephrology yesterday, given 1 dose of acetazolamide in addition to patient's regimen of Lasix 40mg BID  Patient continues to improve, +2 BLE edema, decreasing weight (251 lbs -> 243 lbs)  Plan:  Continue Lasix 40m IV BID  Give 1 more dose of Acetazolamide 250mg today  For ischemic workup once acute concerns have resolved.  May need EKG and possibly stress test to assess worsening HF  Continue 2 g sodium diet  Strict I/O  Monitor daily weight  Monitor BMP, Mg, Phos daily    Persistent Atrial Fibrillation  Currently rate controlled at 60-80s bpm  Plan:  Continue metoprolol 25mg daily, aspirin 81mg daily and eliquis 5mg BID    Sinus node dysfunction s/p dual chamber pacemaker    History of Hypertension  Home meds: Lisinopril 40mg daily and HCTZ 25mg daily - currently on hold due to hyperkalemia and increased creatinine  Currently on metoprolol 25mg daily and furosemide 40mg BID  Current BP ranges 120-140s SBP/60-80s DBP, more controlled  Had one spike of elevated BP at 176/78  Plan:  Continue to hold home antihypertensive medications  Continue metoprolol 25mg daily, furosemide 40mg BID and amlodipine 5mg daily  Monitor BMP daily  Monitor BP regularly    History of hyperlipidemia  Lipid panel 6/14/21 was significant for elevated triglyceride level at 165  Plan:  Continue atorvastatin 40mg daily    Pulmonary  Chronic hypoxic respiratory failure 2/2 obesity related restrictive lung disease and acute decompensated HF  Currently at 4L NC at home  Chest CT showed new small/moderate L and trace R-sided pleural effusions with 5mm stable solid pulmonary nodule noted.   PFT in 2020 consistent with moderate restrictive pathology  On breathing treatments with Duoneb, Brovana and Pulmicort  Plan:  Continue breathing treatments  F/u ambulatory pulse oximetry    Left sided pleural effusion 2/2 HFpEF exacerbation  CXR showed L-sided pleural effusion  Chest CT showed new small/moderate L and trace R-sided pleural effusions   Plan:  Continue Lasix 40mg BID    Obstructive Sleep Apnea  Non-compliant with BiPAP  Plan:  Continue BiPAP overnight and during naps    Genitourinary/Renal  Hyperkalemia - resolved  K on admission 6.3, treated in ED with Ca gluconate  Was on K supplements  Currently downtrending at 4.2 today  Started on K-restricted diet  Plan:  Continue Lasix 40mg BID  Continue K restriction  Monitor BMP daily    Endocrine  Insulin-Dependent Diabetes Mellitus Type 2  On lantus 25u + LDSSI  Plan:  Continue current insulin regimen  Glucose checks AC/HS  Continue hypoglycemia treatment    History of Hypothyroidism  TSH 6/4/21: 5.94 (elevated)  Repeat TSH 8.350  On levothyroxine 112mcg daily  Plan:  Increase levothyroxine to 150 mcg daily    History of obesity  BMI 41.38    PT/OT evaluation: 20/24 17/24  DVT prophylaxis/ GI prophylaxis: apixaban/on diet  Disposition: continue current care    Nevaeh Glover MD, PGY-1  Attending physician: Dr. Kory Matute

## 2021-09-23 LAB
ANION GAP SERPL CALCULATED.3IONS-SCNC: 7 MMOL/L (ref 7–16)
BASOPHILS ABSOLUTE: 0.07 E9/L (ref 0–0.2)
BASOPHILS RELATIVE PERCENT: 1.2 % (ref 0–2)
BUN BLDV-MCNC: 33 MG/DL (ref 6–23)
CALCIUM SERPL-MCNC: 10 MG/DL (ref 8.6–10.2)
CHLORIDE BLD-SCNC: 92 MMOL/L (ref 98–107)
CO2: 39 MMOL/L (ref 22–29)
CREAT SERPL-MCNC: 1.3 MG/DL (ref 0.7–1.2)
EKG ATRIAL RATE: 59 BPM
EKG Q-T INTERVAL: 464 MS
EKG QRS DURATION: 148 MS
EKG QTC CALCULATION (BAZETT): 464 MS
EKG R AXIS: 99 DEGREES
EKG VENTRICULAR RATE: 60 BPM
EOSINOPHILS ABSOLUTE: 0.31 E9/L (ref 0.05–0.5)
EOSINOPHILS RELATIVE PERCENT: 5.4 % (ref 0–6)
GFR AFRICAN AMERICAN: >60
GFR NON-AFRICAN AMERICAN: 55 ML/MIN/1.73
GLUCOSE BLD-MCNC: 187 MG/DL (ref 74–99)
HCT VFR BLD CALC: 32 % (ref 37–54)
HEMOGLOBIN: 10 G/DL (ref 12.5–16.5)
IMMATURE GRANULOCYTES #: 0.06 E9/L
IMMATURE GRANULOCYTES %: 1 % (ref 0–5)
LYMPHOCYTES ABSOLUTE: 0.96 E9/L (ref 1.5–4)
LYMPHOCYTES RELATIVE PERCENT: 16.7 % (ref 20–42)
MAGNESIUM: 1.9 MG/DL (ref 1.6–2.6)
MCH RBC QN AUTO: 30 PG (ref 26–35)
MCHC RBC AUTO-ENTMCNC: 31.3 % (ref 32–34.5)
MCV RBC AUTO: 96.1 FL (ref 80–99.9)
METER GLUCOSE: 173 MG/DL (ref 74–99)
METER GLUCOSE: 185 MG/DL (ref 74–99)
METER GLUCOSE: 241 MG/DL (ref 74–99)
METER GLUCOSE: 258 MG/DL (ref 74–99)
MONOCYTES ABSOLUTE: 0.69 E9/L (ref 0.1–0.95)
MONOCYTES RELATIVE PERCENT: 12 % (ref 2–12)
NEUTROPHILS ABSOLUTE: 3.65 E9/L (ref 1.8–7.3)
NEUTROPHILS RELATIVE PERCENT: 63.7 % (ref 43–80)
PDW BLD-RTO: 14.3 FL (ref 11.5–15)
PHOSPHORUS: 5.1 MG/DL (ref 2.5–4.5)
PLATELET # BLD: 214 E9/L (ref 130–450)
PMV BLD AUTO: 9.8 FL (ref 7–12)
POTASSIUM SERPL-SCNC: 4 MMOL/L (ref 3.5–5)
RBC # BLD: 3.33 E12/L (ref 3.8–5.8)
SODIUM BLD-SCNC: 138 MMOL/L (ref 132–146)
WBC # BLD: 5.7 E9/L (ref 4.5–11.5)

## 2021-09-23 PROCEDURE — 2580000003 HC RX 258: Performed by: INTERNAL MEDICINE

## 2021-09-23 PROCEDURE — 6370000000 HC RX 637 (ALT 250 FOR IP): Performed by: INTERNAL MEDICINE

## 2021-09-23 PROCEDURE — 36415 COLL VENOUS BLD VENIPUNCTURE: CPT

## 2021-09-23 PROCEDURE — 94640 AIRWAY INHALATION TREATMENT: CPT

## 2021-09-23 PROCEDURE — 99232 SBSQ HOSP IP/OBS MODERATE 35: CPT | Performed by: INTERNAL MEDICINE

## 2021-09-23 PROCEDURE — 6370000000 HC RX 637 (ALT 250 FOR IP): Performed by: NURSE PRACTITIONER

## 2021-09-23 PROCEDURE — 6360000002 HC RX W HCPCS: Performed by: INTERNAL MEDICINE

## 2021-09-23 PROCEDURE — 80048 BASIC METABOLIC PNL TOTAL CA: CPT

## 2021-09-23 PROCEDURE — 93005 ELECTROCARDIOGRAM TRACING: CPT | Performed by: NURSE PRACTITIONER

## 2021-09-23 PROCEDURE — 83735 ASSAY OF MAGNESIUM: CPT

## 2021-09-23 PROCEDURE — 85025 COMPLETE CBC W/AUTO DIFF WBC: CPT

## 2021-09-23 PROCEDURE — 2060000000 HC ICU INTERMEDIATE R&B

## 2021-09-23 PROCEDURE — 99291 CRITICAL CARE FIRST HOUR: CPT | Performed by: INTERNAL MEDICINE

## 2021-09-23 PROCEDURE — 2700000000 HC OXYGEN THERAPY PER DAY

## 2021-09-23 PROCEDURE — 82962 GLUCOSE BLOOD TEST: CPT

## 2021-09-23 PROCEDURE — 84100 ASSAY OF PHOSPHORUS: CPT

## 2021-09-23 PROCEDURE — 94660 CPAP INITIATION&MGMT: CPT

## 2021-09-23 RX ORDER — MAGNESIUM SULFATE 1 G/100ML
1000 INJECTION INTRAVENOUS ONCE
Status: COMPLETED | OUTPATIENT
Start: 2021-09-23 | End: 2021-09-23

## 2021-09-23 RX ORDER — ACETAZOLAMIDE 250 MG/1
250 TABLET ORAL 2 TIMES DAILY
Status: COMPLETED | OUTPATIENT
Start: 2021-09-23 | End: 2021-09-23

## 2021-09-23 RX ADMIN — ARFORMOTEROL TARTRATE 15 MCG: 15 SOLUTION RESPIRATORY (INHALATION) at 20:41

## 2021-09-23 RX ADMIN — Medication 10 ML: at 21:37

## 2021-09-23 RX ADMIN — METOPROLOL SUCCINATE 25 MG: 25 TABLET, EXTENDED RELEASE ORAL at 10:21

## 2021-09-23 RX ADMIN — Medication 10 ML: at 09:00

## 2021-09-23 RX ADMIN — LEVOTHYROXINE SODIUM 150 MCG: 0.1 TABLET ORAL at 06:42

## 2021-09-23 RX ADMIN — INSULIN GLARGINE 25 UNITS: 100 INJECTION, SOLUTION SUBCUTANEOUS at 21:32

## 2021-09-23 RX ADMIN — IPRATROPIUM BROMIDE AND ALBUTEROL SULFATE 1 AMPULE: .5; 2.5 SOLUTION RESPIRATORY (INHALATION) at 14:16

## 2021-09-23 RX ADMIN — INSULIN LISPRO 2 UNITS: 100 INJECTION, SOLUTION INTRAVENOUS; SUBCUTANEOUS at 18:22

## 2021-09-23 RX ADMIN — ASPIRIN 81 MG: 81 TABLET ORAL at 10:20

## 2021-09-23 RX ADMIN — INSULIN LISPRO 2 UNITS: 100 INJECTION, SOLUTION INTRAVENOUS; SUBCUTANEOUS at 06:21

## 2021-09-23 RX ADMIN — INSULIN LISPRO 6 UNITS: 100 INJECTION, SOLUTION INTRAVENOUS; SUBCUTANEOUS at 12:25

## 2021-09-23 RX ADMIN — ARFORMOTEROL TARTRATE 15 MCG: 15 SOLUTION RESPIRATORY (INHALATION) at 14:16

## 2021-09-23 RX ADMIN — BUDESONIDE 250 MCG: 0.25 SUSPENSION RESPIRATORY (INHALATION) at 14:16

## 2021-09-23 RX ADMIN — BUDESONIDE 250 MCG: 0.25 SUSPENSION RESPIRATORY (INHALATION) at 20:40

## 2021-09-23 RX ADMIN — MAGNESIUM SULFATE HEPTAHYDRATE 1000 MG: 1 INJECTION, SOLUTION INTRAVENOUS at 10:18

## 2021-09-23 RX ADMIN — ACETAZOLAMIDE 250 MG: 250 TABLET ORAL at 12:28

## 2021-09-23 RX ADMIN — ACETAZOLAMIDE 250 MG: 250 TABLET ORAL at 21:31

## 2021-09-23 RX ADMIN — ACETAMINOPHEN 650 MG: 325 TABLET ORAL at 21:45

## 2021-09-23 RX ADMIN — AMLODIPINE BESYLATE 5 MG: 5 TABLET ORAL at 10:20

## 2021-09-23 RX ADMIN — INSULIN LISPRO 2 UNITS: 100 INJECTION, SOLUTION INTRAVENOUS; SUBCUTANEOUS at 21:32

## 2021-09-23 RX ADMIN — IPRATROPIUM BROMIDE AND ALBUTEROL SULFATE 1 AMPULE: .5; 2.5 SOLUTION RESPIRATORY (INHALATION) at 20:40

## 2021-09-23 RX ADMIN — ATORVASTATIN CALCIUM 40 MG: 40 TABLET, FILM COATED ORAL at 10:21

## 2021-09-23 ASSESSMENT — PAIN SCALES - GENERAL
PAINLEVEL_OUTOF10: 0

## 2021-09-23 NOTE — PLAN OF CARE
Problem: Infection:  Goal: Will remain free from infection  Description: Will remain free from infection  Outcome: Met This Shift     Problem: Safety:  Goal: Free from accidental physical injury  Description: Free from accidental physical injury  Outcome: Met This Shift  Goal: Free from intentional harm  Description: Free from intentional harm  Outcome: Met This Shift     Problem: Daily Care:  Goal: Daily care needs are met  Description: Daily care needs are met  Outcome: Met This Shift     Problem: Pain:  Goal: Patient's pain/discomfort is manageable  Description: Patient's pain/discomfort is manageable  Outcome: Met This Shift  Goal: Pain level will decrease  Description: Pain level will decrease  Outcome: Met This Shift  Goal: Control of acute pain  Description: Control of acute pain  Outcome: Met This Shift  Goal: Control of chronic pain  Description: Control of chronic pain  Outcome: Met This Shift

## 2021-09-23 NOTE — PROGRESS NOTES
[id number] [facility]  Internal Medicine Residency / [address]    Attending Physician Statement  I have discussed the case, including pertinent history and exam findings with the resident and the team.  I have seen and examined the patient and the key elements of the encounter have been performed by me. I agree with the assessment, plan and orders as documented by the resident. Principal Problem:    Acute heart failure with preserved ejection fraction (HFpEF) (HCC)  Active Problems:    DM (diabetes mellitus) (HCC)    NIKO (obstructive sleep apnea)    Sinus node dysfunction (HCC)    Persistent atrial fibrillation (HCC)    Chronic hypercapnic respiratory failure (HCC)  Resolved Problems:    * No resolved hospital problems. *    Appreciate EP consult and identification of need for new RA lead as lack of synchrony likely contributive to CHF. [address] held pre-operatively. Diamox today, hold lasix as discussed with nephrology. He is at home weight. Anticipate PO lasix tomorrow. Continue current insulin regimen, basal / pre-meal SSI. Remainder of medical problems as per resident note.       Ila East DO, DO  Internal Medicine Residency Faculty

## 2021-09-23 NOTE — PROGRESS NOTES
Call from [facility], spoke with Jazlyn Marin, voiced that patient v pace rhythm was firing randomly. Perfect serve message sent to Dr. Armond Pat, requesting cardiology consult. Patient is currently in bed eating breakfast, not voicing distress at this time. Electronically signed by Chaitanya Levine RN on 9/23/2021 at 7:48 AM

## 2021-09-23 NOTE — ACP (ADVANCE CARE PLANNING)
Advance Care Planning   Healthcare Decision Maker:    Primary Decision Maker: Bing Oppenheim - Spouse - [phone]    Secondary Decision Maker: Kamilla Stauffer Child - [phone]     Spoke with pt at bedside, primary is wife, daughter secondary.

## 2021-09-23 NOTE — PROGRESS NOTES
Perfect serve responded, call from Dr. Connor Thurston and spoke with him. Will consult electrophysiology. Electronically signed by Yaneth Magdaleno RN on 9/23/2021 at 8:44 AM

## 2021-09-23 NOTE — PROGRESS NOTES
Department of Internal Medicine  Nephrology Attending Consult Note    Events reviewed. SUBJECTIVE: We are following Mr. Rocha for diuretic management. Reports no complaints.     PHYSICAL EXAM:      Vitals:    VITALS:  /60   Pulse 68   Temp 97 °F (36.1 °C) (Temporal)   Resp 18   Ht 5' 7.01\" (1.702 m)   Wt 248 lb 14.4 oz (112.9 kg)   SpO2 94%   BMI 38.97 kg/m²   24HR INTAKE/OUTPUT:      Intake/Output Summary (Last 24 hours) at 9/23/2021 1258  Last data filed at 9/23/2021 1142  Gross per 24 hour   Intake 770 ml   Output --   Net 770 ml       Constitutional: Patient is awake, in no distress  HEENT: Pupils are equal reactive  Respiratory: Decreased breath sounds at bases  Cardiovascular/Edema: Heart sounds are regular  Gastrointestinal: Abdomen soft  Neurologic: Nonfocal  Skin: No lesions  Other: 1+ edema lower extremities, 1+ abdominal wall edema    Scheduled Meds:   acetaZOLAMIDE  250 mg Oral BID    levothyroxine  150 mcg Oral Daily    amLODIPine  5 mg Oral Daily    insulin lispro  0-12 Units SubCUTAneous TID WC    insulin lispro  0-6 Units SubCUTAneous Nightly    [Held by provider] apixaban  5 mg Oral BID    aspirin  81 mg Oral Daily    atorvastatin  40 mg Oral Daily    metoprolol succinate  25 mg Oral Daily    sodium chloride flush  10 mL IntraVENous 2 times per day    insulin glargine  25 Units SubCUTAneous Nightly    ipratropium-albuterol  1 ampule Inhalation Q4H WA    budesonide  250 mcg Nebulization BID    Arformoterol Tartrate  15 mcg Nebulization BID     Continuous Infusions:   dextrose      sodium chloride       PRN Meds:.miconazole, glucose, dextrose, glucagon (rDNA), dextrose, sodium chloride flush, sodium chloride, ondansetron **OR** ondansetron, polyethylene glycol, acetaminophen **OR** acetaminophen, glucose, glucose    DATA:    CBC:   Lab Results   Component Value Date    WBC 5.7 09/23/2021    RBC 3.33 09/23/2021    HGB 10.0 09/23/2021    HCT 32.0 09/23/2021    MCV 96.1 09/23/2021    MCH 30.0 09/23/2021    MCHC 31.3 09/23/2021    RDW 14.3 09/23/2021     09/23/2021    MPV 9.8 09/23/2021     CMP:    Lab Results   Component Value Date     09/23/2021    K 4.0 09/23/2021    K 5.5 09/18/2021    CL 92 09/23/2021    CO2 39 09/23/2021    BUN 33 09/23/2021    CREATININE 1.3 09/23/2021    GFRAA >60 09/23/2021    LABGLOM 55 09/23/2021    GLUCOSE 187 09/23/2021    GLUCOSE 133 04/18/2012    PROT 6.9 09/18/2021    LABALBU 3.9 09/18/2021    LABALBU 4.4 10/25/2011    CALCIUM 10.0 09/23/2021    BILITOT 0.5 09/18/2021    ALKPHOS 85 09/18/2021    AST 18 09/18/2021    ALT 20 09/18/2021     Magnesium:    Lab Results   Component Value Date    MG 1.9 09/23/2021     Phosphorus:    Lab Results   Component Value Date    PHOS 5.1 09/23/2021     Radiology Review:      CT chest without contrast 9/18/2021   Intervally new small to moderate left and trace right pleural effusions.       Mild thickening of secondary interlobular septa with ground-glass attenuation   at some levels, compatible with an element of interstitial pulmonary edema.       Patchy mild subsegmental atelectasis bilaterally, worse within the basal left   lower lobe and inferior lingula.       Multiple essentially stable pulmonary nodules bilaterally.       Intervally new small volume ascites and flank edema.       Mild upper abdominal lymphadenopathy.           Chest x-ray 9/17/2021   Left-sided pleural effusion.       Cardiomegaly.       Discrete patchy infiltrate in the left base questionable on the right. Cannot exclude bilateral pneumonia.  Please correlate clinically.           BRIEF SUMMARY OF INITIAL CONSULT:    Briefly Mr. Rocha is a 67-year-old  man with history of HTN, T2DM, history of ischemic ATN for which he required HD treatment for 4 days with adequate renal function recovery in May 2017, atrial fibrillation, NIKO, pacemaker placement, hypothyroidism, hyperlipidemia, status post back surgery x2, who was admitted on 9/17/2021 after he presented to the ER with shortness of breath. He was admitted with the diagnosis of decompensated congestive heart failure. Since admission he has received multiple doses of loop diuretics. His creatinine level on admission was 1.4 mg/dl and has remained relatively stable but his bicarbonate level has increased up to 34. He continues to have significant edema and we are consulted for diuretic management. Problems resolved:    · Mild elevated creatinine level without criteria of RADHA, due to decompensated heart failure, cardiorenal syndrome. IMPRESSION/RECOMMENDATIONS:        1. HFpEF 55-60%, proBNP 1600> 1421, on acetazolamide bid X2 doses today. 2. Normal pH (pH: 7.422) with respiratory acidosis (PCO2: 43.8) and metabolic alkalosis (contraction alkalosis). Bicarbonate levels have increased today. Will hold loop diuretics today and give 2 doses of acetazolamide today. 3. HTN, on amlodipine, metoprolol  4. Hypomagnesemia, 2/2 diuretics. Mag level stable today  ------------------------------------------------------------------  5. Atrial fibrillation, on metoprolol, apixaban  6. NIKO  7.  Normocytic anemia    Plan:    · Stop IV Lasix, will transition to oral tomorrow  · Give acetazolamide 250 mg po x 2 doses  · Continue to monitor renal function  · Continue to monitor electrolytes  · Continue to monitor bicarbonate level  · Continue to monitor magnesium level    Electronically signed by JACQUELINE Peralta CNP on 9/23/2021 at 1:02 PM

## 2021-09-23 NOTE — PROGRESS NOTES
[facility]  Internal Medicine Residency Program  Progress Note - House Team     Patient:  Alfredo Rivera 74 y.o. male MRN: [id number]     Date of Service: 9/23/2021     CC: SOB  Overnight events: NAEO    Subjective     Patient was seen and examined this morning at bedside in no acute distress. Overnight, nurse noted that patient's ventricular paced rhythm was firing randomly. EP was consulted. Patient seen at bedside this AM.  Pt admits to improved SOB and denies any fevers, chills, abdominal pain. When asked about patient's episode of pacemaker misfiring, patient admits to no feelings of palpitations, chest discomfort, chest pain overnight. Objective     Physical Exam:  Vitals: BP (!) 118/54   Pulse 63   Temp 96.5 °F (35.8 °C) (Temporal)   Resp 18   Ht 5' 7.01\" (1.702 m)   Wt 248 lb 14.4 oz (112.9 kg)   SpO2 93%   BMI 38.97 kg/m²     I & O - 24hr: No intake/output data recorded. General Appearance: alert, appears stated age and cooperative  HEENT:  Head: Normocephalic, no lesions, without obvious abnormality.   Lung: clear to auscultation bilaterally  Heart: regular rate and rhythm, S1, S2 normal, no murmur, click, rub or gallop  Abdomen: Soft, distended, non-tender  Extremities:  edema 2+ bilaterally and extremities normal, atraumatic, no cyanosis  Neurologic: Mental status: Alert, oriented, thought content appropriate  Subject  Pertinent Labs & Imaging Studies   carmela  CBC:   Lab Results   Component Value Date    WBC 5.7 09/23/2021    RBC 3.33 09/23/2021    HGB 10.0 09/23/2021    HCT 32.0 09/23/2021    MCV 96.1 09/23/2021    MCH 30.0 09/23/2021    MCHC 31.3 09/23/2021    RDW 14.3 09/23/2021     09/23/2021    MPV 9.8 09/23/2021     CMP:    Lab Results   Component Value Date     09/23/2021    K 4.0 09/23/2021    K 5.5 09/18/2021    CL 92 09/23/2021    CO2 39 09/23/2021    BUN 33 09/23/2021    CREATININE 1.3 09/23/2021    GFRAA >60 09/23/2021    LABGLOM 55 09/23/2021 GLUCOSE 187 09/23/2021    GLUCOSE 133 04/18/2012    PROT 6.9 09/18/2021    LABALBU 3.9 09/18/2021    LABALBU 4.4 10/25/2011    CALCIUM 10.0 09/23/2021    BILITOT 0.5 09/18/2021    ALKPHOS 85 09/18/2021    AST 18 09/18/2021    ALT 20 09/18/2021       CT CHEST WO CONTRAST   Final Result   Intervally new small to moderate left and trace right pleural effusions. Mild thickening of secondary interlobular septa with ground-glass attenuation   at some levels, compatible with an element of interstitial pulmonary edema. Patchy mild subsegmental atelectasis bilaterally, worse within the basal left   lower lobe and inferior lingula. Multiple essentially stable pulmonary nodules bilaterally. Intervally new small volume ascites and flank edema. Mild upper abdominal lymphadenopathy. RECOMMENDATIONS:   Multiple pulmonary nodules. Most severe: 5 mm solid pulmonary nodule. No   routine follow-up imaging is recommended. These guidelines do not apply to immunocompromised patients and patients with   cancer. Follow up in patients with significant comorbidities as clinically   warranted. For lung cancer screening, adhere to Lung-RADS guidelines. Reference: Radiology. 2017; 284(1):228-43. XR CHEST (2 VW)   Final Result   Left-sided pleural effusion. Cardiomegaly. Discrete patchy infiltrate in the left base questionable on the right. Cannot exclude bilateral pneumonia. Please correlate clinically. Resident's Assessment and Plan     Assessment and Plan:  Dior Hoover is a 70-year old male with a past medical history of HFpEF (EF 60-65%), persistent Afib, hypertension, hypothyroidism, IDDM Type 2, sinus node dysfunction s/p dual chamber pacemaker, morbid obesity with restrictive lung disease, presented with shortness of breath and continued weight gain.  Patient was seen by his PCP 4 days PTA, his Lasix dose was increased from 40mg daily to 40mg BID; however, was still having increased swelling on both lower extremities and abdomen. He noticed weight gain of at least 20 lbs for the past month. He is on 4L NC at home. CXR showed L-sided pleural effusion, given 1 dose of Lasix in the ED which helped him. He had K level of 6.3 during admission for which he was Ca gluconate; however, his current K levels are still at 5.3-5.5. His current dosage of Lasix was continued. On hospital day 3, patient has been improving. Leg swelling has decreased. On hospital day 4, patient is still volume overloaded despite giving additional dose of 80 mg Lasix. Nephrology was consulted for diuretic management. Nephrology recommended to continue Lasix 40mg BID and gave 1 dose of acetazolamide. Patient has been clinically improving, with noticeable improvement in leg swelling and anasarca. He is currently being managed for the following:      Acute Decompensated HFpEF (EF: 55-60%, 9/20/21)  Patient presented with 4+ pitting edema in BLE up to his knees with associated anasarca  Patient was last seen by PCP on 9/13/2021. At this visit patient was complaining of worsening SOB. At this time home medication Lasix 40 mg daily increased to Lasix 40 mg twice daily. CT chest on admission showed bilateral pleural effusions L>R with interstitial pulmonary edema and new small volume ascites with flank edema. proBNP 1600 -> 1421   Echo 10/2020 showed EF 60-65% with normal LV and RV size and systolic function. Repeat echo 9/2021 was a limited study, but showed EF 55-60% with no wall abnormalities and normal LV ejection fraction  Per nephro, patient given Diamox 250 mg x 2 today. Patient currently managing with Lasix 40 mg IV twice daily, will stop IV Lasix today. Will transition to p.o. tomorrow  Monitor daily weight, BMP, mag, Phos daily    Persistent A.  Fib  Currently rate controlled in the 60s to 80s  Continue metoprolol 25 mg daily, ASA 81 mg daily and Eliquis 5 mg twice daily    Sinus Node Dysfunction s/p dual chamber pacemaker placement  Hx of HTN  Home meds: Lisinopril 40 mg daily and HCTZ 25 mg daily -currently holding due to hyperkalemia  Continue metoprolol 25 mg daily and amlodipine 5 mg daily. Stopping Lasix 40 mg IV today, will transition to p.o. tomorrow. Monitor BMP daily  Monitor BP    Hx of HLD  Continue atorvastatin 40 mg daily    Chronic Hypoxic Respiratory Failure 2/2 obesity related Restrictive Lung disease and acute decompensated HF  Patient currently uses 4 L nasal cannula at home  PFTs in 2020 consistent with moderate restrictive pathology. Patient on breathing treatments: DuoNeb, Brovana, Pulmicort    L. Sided plueral effusion 2/2 HFpEF exacerbation  CXR showed left-sided pleural effusion  Patient symptomatically improving. Stop Lasix today. Will transition to p.o. Lasix tomorrow. Hx of NIKO  Patient to continue BiPAP overnight. Patient admits noncompliance with BiPAP    Hyperkalemia - resolved  K on admission 6.3. In the ED patient treated with calcium gluconate. Patient was previously on potassium supplements 20 mEq twice daily - hold  Monitor BMP daily  K currently within normal limits    Hx of T2DM  On Lantus 22year-old and low-dose sliding scale  Glucose checks AC/at bedtime    Hx of Hypothyroidism  TSH 6/4/2021: 5.9  Repeat TSH 8.350  Continue levothyroxine 150 mcg daily    Obesity  BMI 30.97    Pacemaker Malfunction  Overnight (9/23/21) nurse noted that patient's ventricular paced rhythm was firing randomly. EP was consulted. Per EP, will attempt to optimize atrial lead programming if unable to achieve this, patient will need replacement of atrial lead. Eliquis is on hold. Per EP, attempted to reprogram atrial lead but was unsuccessful.    Patient for addition of new RA lead to help restore AV synchrony and help with CHF tomorrow (9/24/2021)      PT/OT evaluation: 17/24  DVT prophylaxis/ GI prophylaxis: Apixaban/diet  Disposition: continue current care    Narendra Adams MD, PGY-1  Attending physician: Dr. Tosha Lopez

## 2021-09-23 NOTE — PROGRESS NOTES
[facility]  Internal Medicine Residency Program  Progress Note - House Team 1    Patient:  Elias Rodrigues 74 y.o. male MRN: [id number]     Date of Service: 9/23/2021     CC: Shortness of breath  Overnight events: NAEO    Subjective     Patient seen and assessed at the bedside. NAEO. He states that he continues to feel improvement in swelling and ease of breathing with each day. He denies any chest pain or shortness of breath overnight. Per nursing, pacer described as randomly firing. Patient states that electrodes fell off overnight and may have contributed. He describes sending pacer info monthly to his cardiologist.     Objective     Vitals: /60   Pulse 68   Temp 97 °F (36.1 °C) (Temporal)   Resp 18   Ht 5' 7.01\" (1.702 m)   Wt 248 lb 14.4 oz (112.9 kg)   SpO2 94%   BMI 38.97 kg/m²     I & O - 24hr: No intake/output data recorded. Physical Exam  Constitutional:       General: He is not in acute distress. Appearance: Normal appearance. He is obese. HENT:      Head: Normocephalic and atraumatic. Mouth/Throat:      Mouth: Mucous membranes are moist.      Pharynx: Oropharynx is clear. Eyes:      Extraocular Movements: Extraocular movements intact. Conjunctiva/sclera: Conjunctivae normal.      Pupils: Pupils are equal, round, and reactive to light. Cardiovascular:      Rate and Rhythm: Normal rate and regular rhythm. Pulses: Normal pulses. Heart sounds: Normal heart sounds. Pulmonary:      Effort: Pulmonary effort is normal.      Breath sounds: Normal breath sounds. Abdominal:      General: Bowel sounds are normal. There is distension. Palpations: Abdomen is soft. Musculoskeletal:         General: Normal range of motion. Cervical back: Normal range of motion and neck supple. Right lower leg: Edema present. Left lower leg: Edema present. Comments: BLE +2 pitting edema   Skin:     General: Skin is warm and dry.       Capillary Refill: Capillary refill takes less than 2 seconds. Neurological:      General: No focal deficit present. Mental Status: He is alert and oriented to person, place, and time. Mental status is at baseline. Psychiatric:         Mood and Affect: Mood normal.     Subject  Pertinent Labs & Imaging Studies   carmela  CBC with Differential:    Lab Results   Component Value Date    WBC 5.7 09/23/2021    RBC 3.33 09/23/2021    HGB 10.0 09/23/2021    HCT 32.0 09/23/2021     09/23/2021    MCV 96.1 09/23/2021    MCH 30.0 09/23/2021    MCHC 31.3 09/23/2021    RDW 14.3 09/23/2021    SEGSPCT 68 04/27/2011    LYMPHOPCT 16.7 09/23/2021    MONOPCT 12.0 09/23/2021    EOSPCT 7 10/05/2010    BASOPCT 1.2 09/23/2021    MONOSABS 0.69 09/23/2021    LYMPHSABS 0.96 09/23/2021    EOSABS 0.31 09/23/2021    BASOSABS 0.07 09/23/2021     CMP:    Lab Results   Component Value Date     09/23/2021    K 4.0 09/23/2021    K 5.5 09/18/2021    CL 92 09/23/2021    CO2 39 09/23/2021    BUN 33 09/23/2021    CREATININE 1.3 09/23/2021    GFRAA >60 09/23/2021    LABGLOM 55 09/23/2021    GLUCOSE 187 09/23/2021    GLUCOSE 133 04/18/2012    PROT 6.9 09/18/2021    LABALBU 3.9 09/18/2021    LABALBU 4.4 10/25/2011    CALCIUM 10.0 09/23/2021    BILITOT 0.5 09/18/2021    ALKPHOS 85 09/18/2021    AST 18 09/18/2021    ALT 20 09/18/2021     Magnesium:    Lab Results   Component Value Date    MG 1.9 09/23/2021     Phosphorus:    Lab Results   Component Value Date    PHOS 5.1 09/23/2021       Resident's Assessment and Plan     Colecar Kirkland a 74 y. o. male with a past medical history of HFpEF (EF of 60-65% per echo 10/2021), persistent atrial fibrillation (S/P dual chamber pacemaker), DMII, HTN, HLD, hypothyroidism, restrictive lung disease (on 4L of home O2), and NIKO (does not use home CPAP) that presented to the ED on 9/17 with worsening SOB and anasarca.  He was admitted to the house floors for acute exacerbation of HFpEF.     1. Acute Exacerbation of HFpEF (EF 55-60%, echo 09/23/2021)  - Patient stated that he has had worsening SOB and swelling of his abdomen and lower extremities over the past month. - He previously took metoprolol 25 mg daily and Lasix 40mg daily. On 9/13, the patient's lasix dose was increased to 40 mg BID. His symptoms persisted. - BNP noted at 1600. Repeat BNP on 9/21 is 1421.  - Troponin elevated on admission, likely 2/2 to supply demand mismatch of HF exacerbation.   - Today, the patient states that he feels less fluid overloaded.   - Echo (9/20/21) limited visualization but shows EF 55-60%, normal left ventricular function, absence of wall abnormalities. - Patient seen by CHF RN and received dietary education and instruction for outpatient follow-up. - Discontinue Lasix 40 mg BID IV per nephrology. Patient appears to be improving.   - Start Diamox 250 mg x2 dose today. Plan to switch to oral lasix tomorrow per nephrology. - Patient appears to be at baseline weight and subjective return to baseline distension of abdomen.  - Continue to monitor fluid balance with strict I/O's and daily weights. - Patient may benefit from stress test outpatient in the future. - Given limited visualization on echo, patient may benefit from right heart catheterization for right ventricular function assessment in the future.     2. Chronic Respiratory Failure 2/2 Obstructive Sleep Apnea, Restrictive Lung Disease, and Fluid Overload  - Previous CXR (9/18) noted mild/moderate left sided pleural effusion and trace right sided effusion.  - On exam today, lung sounds are CTAB. - Continue 4L of home O2 via NC.   - Continue DUONEB, Brovana, and Pulmicort. - On ambulatory pulse oximetry, patient ambulated 20ft and dropped to SpO2 of 89% before returning back to 96% at rest with O2.  - Acquire repeat CXR once fluid overload has subsided clinically.      3. Contraction Alkalosis  - Patient has been receiving lasix diuretic therapy for fluid overload.   - Today HCO3 noted at 39. Patient may have slight compensatory elevation at baseline given restrictive lung disease.  - Monitor for resolution following discontinuation of lasix IV diuresis and two-time dose of Diamox therapy 9/23.     4. Persistent Atrial Fibrillation S/P Dual Chamber Pacemaker  - On exam, patient appears in NSR.  - EKG noted possible concern for delay with pacing on admission.  - Continue Eliquis, metoprolol, and ASA. - Continue telemetry. - Today, pacer appears to be firing at random. - Electrophysiology consulted and has noted that atrial lead neither senses or captures following interogation and possible need for replacement of atrial lead. Will follow-up on any recommendations.     5. Hyperkalemia - Resolved  - K+ at 6.3 during admission. Today K+ is 4.0. Monitor BMP. - Patient takes potassium phos at home. Given hyperkalemia on previous regimen. Patient will be sent home with recommendations to discontinue potassium supplementation and will need to resume lisinopril.  - Continue cardiac/potassium-restricted diet.     6. Hypertension  - History of hypertension with home meds held (HCTZ and ACE-i). - Systolic BP has remained within target range. - Discontinue amlodipine 5 mg and resume lisinopril per home dose.     7. Abdominal Rash (Right-sided)  - Patient complaining of right-sided pruritic rash along right waste line. - Today, pruritus is improving.  - Continue miconazole ointment.     8. Obstructive Sleep Apnea  - Patient continues to refuse CPAP at night due to discomfort. - Patient states that he does not use CPAP machine as recommended at home.     9. Diabetes Mellitus Type II  - Has remained relatively controlled with several episodes of BG >200.  - Continue Lantus and sliding scale insulin     10. Hx of Hyperlipidemia  - Continue home Lipitor 40 mg daily.     11. Hx of Hypothyroidism  - TSH elevated today at 8.35.  May be contributing to current HFpEF exacerbation.  - Synthroid readjusted to 150 mcg daily.     12.  Full Code     PT/OT evaluation: Evaluated 9/21, [facility] 20/24  DVT prophylaxis/ GI prophylaxis: Eliquis/Diet  Disposition: Continue current care     Zamzam Ann  Attending physician: Alanis Jimenez

## 2021-09-23 NOTE — CONSULTS
Cardiac Electrophysiology Consultation Note    Samanta Pickett  1955  Date of Service: 9/23/2021  PCP: Silvia Anaya MD  Electrophysiologist: Dr Catrcahita Jimenez    Reason for Consultation:  Abnormal pacemaker function    SUBJECTIVE: Samanta Pickett is a 75 yo male who I was asked to see in Cardiac Electrophysiology consultation for atrial lead malfunction. He has history of SND s/p PPM 10/2017, Nonsustained ventricular tachycardia, PAF on Eliquis, Restrictive lung disease, Hypothyroidism, NIKO, DM-2     He presented 9/18/21 with increasing sob and weight gain and was found to have decompensated CHF. He was noted to have abnormal telemetry hence EP consult    He still has some sob but it is overall improved.  He denies any chest pain, dizziness      Patient Active Problem List    Diagnosis Date Noted    Chronic hypercapnic respiratory failure ([address] 09/21/2021    Acute heart failure with preserved ejection fraction (HFpEF) ([address] 09/17/2021    Chronic obstructive pulmonary disease, unspecified COPD type ([address] 09/13/2021    Diabetes mellitus type 2 in obese ([address]     Obesity     Persistent atrial fibrillation ([address] 07/25/2018    Pacemaker      Overview Note:     DOI 10/3/2017  [employer]tr[employer]; dual chamber; MRI conditional   Indication: Sinus node dysfunction       Sinus node dysfunction ([address] 10/03/2017    Atypical atrial flutter ([address] 09/07/2017    Restrictive lung disease secondary to obesity 07/25/2016    NIKO (obstructive sleep apnea) 08/08/2013    Hypoxemia 08/08/2013    Lung nodules 04/15/2013    Arthritis of shoulder region, left 04/15/2013    OA (osteoarthritis of the spine) 04/15/2013     Overview Note:     Lumbar      S/P laminectomy with spinal fusion 04/15/2013     Overview Note:     Done at [facility], March 2012      Colorectal polyps 04/15/2013    Diverticula of colon 04/15/2013    Essential hypertension 05/04/2011    Hypothyroidism 05/04/2011    DM (diabetes mellitus) ([address] 2011    Class 2 obesity due to excess calories with serious comorbidity and body mass index (BMI) of 38.0 to 38.9 in adult 2011    Hyperlipidemia 2011       Past Medical History:   Diagnosis Date    Atrial fibrillation ([address]     Carpal tunnel syndrome     Hyperlipidemia     Hypertension     Hypothyroidism     Lung nodule     Nocturnal hypoxemia due to obesity 2013    Obesity     [facility]obstructive sleep apnea) 2013    [facility]    Osteoarthritis     Pinched nerve     Lumbar    Restrictive lung disease secondary to obesity 2016    Sinus node dysfunction (HCC)     Type II or unspecified type diabetes mellitus without mention of complication, not stated as uncontrolled        Past Surgical History:   Procedure Laterality Date    BACK SURGERY      [facility]. Pinched nerve in back    BACK SURGERY  2017    COLONOSCOPY  2007    multiple colon polyps    COLONOSCOPY  12    COLONOSCOPY    EYE SURGERY      lennie cataracts implant    HERNIA REPAIR      umbilical    PACEMAKER PLACEMENT  10/03/2017    Medtronic dual chamber    Dr. Ramiro Anaya Right        Family History   Problem Relation Age of Onset    Cancer Mother     Heart Disease Mother     High Blood Pressure Father     Cancer Brother     High Blood Pressure Brother     Diabetes Brother        Social History     Tobacco Use    Smoking status: Former Smoker     Packs/day: 0.10     Years: 35.00     Pack years: 3.50     Types: Cigarettes     Quit date: 2004     Years since quittin.8    Smokeless tobacco: Former User   Substance Use Topics    Alcohol use: No     Alcohol/week: 0.0 standard drinks       Current Facility-Administered Medications   Medication Dose Route Frequency Provider Last Rate Last Admin    magnesium sulfate 1000 mg in dextrose 5% 100 mL IVPB  1,000 mg IntraVENous Once Addison Hdez  mL/hr at 21 1018 1,000 mg at 21 1018    levothyroxine (SYNTHROID) tablet 150 mcg  150 mcg Oral Daily Karena Zamora MD   150 mcg at 09/23/21 0642    miconazole (MICOTIN) 2 % cream   Topical Daily PRN Nola Sloares MD        amLODIPine (NORVASC) tablet 5 mg  5 mg Oral Daily Karena Zamora MD   5 mg at 09/23/21 1020    insulin lispro (HUMALOG) injection vial 0-12 Units  0-12 Units SubCUTAneous TID WC Karena Zamora MD   2 Units at 09/23/21 3676    insulin lispro (HUMALOG) injection vial 0-6 Units  0-6 Units SubCUTAneous Nightly Karena Zamora MD   3 Units at 09/22/21 2111    glucose (GLUTOSE) 40 % oral gel 15 g  15 g Oral PRN Latonia Pillai MD        dextrose 50 % IV solution  12.5 g IntraVENous PRN Latonia Pillai MD        glucagon (rDNA) injection 1 mg  1 mg IntraMUSCular PRN Latonia Pillai MD        dextrose 5 % solution  100 mL/hr IntraVENous PRN Latonia Pillai MD        [Held by provider] apixaban Dae Host) tablet 5 mg  5 mg Oral BID Charmaine Raymond MD   5 mg at 09/22/21 2111    aspirin EC tablet 81 mg  81 mg Oral Daily Charmaine Raymond MD   81 mg at 09/23/21 1020    atorvastatin (LIPITOR) tablet 40 mg  40 mg Oral Daily Charmaine Raymond MD   40 mg at 09/23/21 1021    metoprolol succinate (TOPROL XL) extended release tablet 25 mg  25 mg Oral Daily Charmaine Raymond MD   25 mg at 09/23/21 1021    sodium chloride flush 0.9 % injection 10 mL  10 mL IntraVENous 2 times per day Charmaine Raymond MD   10 mL at 09/22/21 2118    sodium chloride flush 0.9 % injection 10 mL  10 mL IntraVENous PRN Charmaine Raymond MD        0.9 % sodium chloride infusion  25 mL IntraVENous PRN Charmaine Raymond MD        ondansetron (ZOFRAN-ODT) disintegrating tablet 4 mg  4 mg Oral Q8H PRN Charmaine Raymond MD        Or    ondansetron (ZOFRAN) injection 4 mg  4 mg IntraVENous Q6H PRN Charmaine Raymond MD        polyethylene glycol (GLYCOLAX) packet 17 g  17 g Oral Daily PRN Charmaine Raymond MD        acetaminophen (TYLENOL) tablet 650 mg  650 mg Oral Q6H PRN Charmaine Raymond MD   650 mg at 09/22/21 2034    Or    acetaminophen (TYLENOL) suppository 650 mg  650 mg Rectal Q6H PRN Khoa Brooks MD        glucose (GLUTOSE) 40 % oral gel 15 g  15 g Oral PRN Khoa Brooks MD        insulin glargine (LANTUS) injection vial 25 Units  25 Units SubCUTAneous Nightly Khoa Brooks MD   25 Units at 09/22/21 2111    ipratropium-albuterol (DUONEB) nebulizer solution 1 ampule  1 ampule Inhalation Q4H WA Khoa Brooks MD   1 ampule at 09/22/21 2058    budesonide (PULMICORT) nebulizer suspension 250 mcg  250 mcg Nebulization BID Khoa Brooks MD   250 mcg at 09/22/21 2057    Arformoterol Tartrate (BROVANA) nebulizer solution 15 mcg  15 mcg Nebulization BID Khoa Brooks MD   15 mcg at 09/22/21 2057    furosemide (LASIX) injection 40 mg  40 mg IntraVENous BID Maddy Rojas MD   40 mg at 09/22/21 1707    glucose (GLUTOSE) 40 % oral gel 15 g  15 g Oral PRN Adriane Klein MD            No Known Allergies    ROS:   Constitutional: Negative for fever, activity change and appetite change. HENT: Negative for epistaxis, difficulty swallowing. Eyes: Negative for blurred vision or double vision. Respiratory: Pos for cough, chest tightness, shortness of breath and wheezing. Cardiovascular: Negative for chest pain, palpitations and leg swelling. Gastrointestinal: Negative for abdominal pain, heartburn, or blood in stool. Genitourinary: Negative for hematuria, burning or frequency. Musculoskeletal: Negative for myalgias, stiffness, or swelling. Skin: Negative for rash, pain, or lumps. Neurological: Negative for syncope, seizures, or headaches. Psychiatric/Behavioral: Negative for confusion and agitation. The patient is not nervous/anxious.       PHYSICAL EXAM:  Vitals:    09/22/21 2100 09/22/21 2300 09/23/21 0545 09/23/21 0826   BP: (!) 127/57 (!) 118/54  127/60   Pulse: 67 63  68   Resp: 18 18  18   Temp: 97 °F (36.1 °C) 96.5 °F (35.8 °C)  97 °F (36.1 °C)   TempSrc: Temporal Temporal  Temporal   SpO2: 94% 93%  94% Weight:   248 lb 14.4 oz (112.9 kg)    Height:         Constitutional: Obese  Head: Normocephalic and atraumatic. Eyes: Conjunctivae are normal.   Neck:  Unable to assess JVD present. Cardiovascular: S1 normal, S2 normal and intact distal pulses. A regular rhythm present. Pulmonary/Chest: Effort normal and breath sounds normal. No respiratory distress. Abdominal: Soft. Obese  Musculoskeletal: Normal range of motion present, no muscle weakness. Neurological: Alert and oriented to person, place, and time. No focal findings on my exam  Skin: Skin is warm and dry. No bruising, no ecchymosis and no rash noted. Extremity: No visible clubbing or cyanosis. Pos edema. Psychiatric: Normal mood and affect. Thought content normal.     Pertinent Labs:  CBC:   Recent Labs     09/21/21  0415 09/22/21  0637 09/23/21  0437   WBC 7.1 6.8 5.7   HGB 9.7* 11.6* 10.0*   HCT 30.4* 36.5* 32.0*    253 214     BMP:  Recent Labs     09/21/21  1756 09/22/21  0637 09/23/21  0437    138 138   K 4.4 4.2 4.0   CL 92* 95* 92*   CO2 33* 34* 39*   BUN 30* 30* 33*   CREATININE 1.2 1.2 1.3*   CALCIUM 10.0 10.3* 10.0     TSH:   Lab Results   Component Value Date    TSH 8.350 (H) 09/20/2021        Lipid Profile:   Lab Results   Component Value Date    TRIG 165 06/14/2021    HDL 24 06/14/2021    LDLCALC 63 06/14/2021    CHOL 120 06/14/2021      Xray:   CT CHEST WO CONTRAST   Final Result   Intervally new small to moderate left and trace right pleural effusions. Mild thickening of secondary interlobular septa with ground-glass attenuation   at some levels, compatible with an element of interstitial pulmonary edema. Patchy mild subsegmental atelectasis bilaterally, worse within the basal left   lower lobe and inferior lingula. Multiple essentially stable pulmonary nodules bilaterally. Intervally new small volume ascites and flank edema. Mild upper abdominal lymphadenopathy.       RECOMMENDATIONS:   Multiple pulmonary nodules. Most severe: 5 mm solid pulmonary nodule. No   routine follow-up imaging is recommended. These guidelines do not apply to immunocompromised patients and patients with   cancer. Follow up in patients with significant comorbidities as clinically   warranted. For lung cancer screening, adhere to Lung-RADS guidelines. Reference: Radiology. 2017; 284(1):228-43. XR CHEST (2 VW)   Final Result   Left-sided pleural effusion. Cardiomegaly. Discrete patchy infiltrate in the left base questionable on the right. Cannot exclude bilateral pneumonia. Please correlate clinically. Pertinent Cardiac Testin21 TTE   Summary   Technically suboptimal and limited study. Left ventricle is normal in size . No regional wall motion abnormalities seen. Normal left ventricular ejection fraction. ECG  21 :   ECG : SB,  ms, very small amplitude p waves, with frequent PVCs, RBBB, LPFB      I have independently reviewed all of the ECGs and rhythm strips per above. I have personally reviewed the laboratory, cardiac diagnostic and radiographic testing as outlined above. I have reviewed previous records noted in [address]. Impression:    1. Pacemaker malfunction  - Very small amplitude P waves on ECG  Suspect atrial undersensing with sinus node dysfunction  - I will interrogate pacemaker and see if atrial lead can be optimized noninvasively. If not, may need atrial lead replacement    2. PAF  - [id number]= 3; On Eliquis  - Toprol XL 25mg QD  - AF burden 0%.    - Presents in NSR.  - Re-education on importance of well controlled HTN (goal BP < 130/80), adequate weight control (goal BMI of < 27), adequate glucose control (goal hemoglobin A1c < 6.5), physical activity consisting of moderate cardiopulmonary exercise up to a goal of 250 min/wk, consider sleep study regarding a formal evaluation for sleep apnea, smoking cessation and limited ETOH intake.      3. Nonsustained ventricular tachycardia  - Asymptomatic.  - Echo in May 2017 showed LV EF of 70%. - Echo 9/2021 LVEF normal  - Toprol 25mg QD      4. Sinus node dysfunction  - S/p pacemaker implantation     5. Hypertension  - Controlled on today's visit   - On Lisinopril, Toprol and HCTZ.     6. Diabetes mellitus  - On Metformin and Insulin. 7. Restrictive lung disease     8. Obesity   Body mass index is 38 kg/m². - Recommend weight loss     9. Hyperlipidemia  - On Lipitor.     10. Hypothyroidism  - On Synthroid.     11. Sleep apnea  - States compliance with his CPAP      Recommendations:    1. Will attempt to optimize atrial lead programming. If unable to achieve this, he will need replacement of atrial lead. Eliquis on hold      I have spent a total of 32 CC minutes with the patient and his/her family reviewing the above stated recommendations. A total of >50% of that time involved face-to-face time providing counseling and or coordination of care with the other providers. Thank you for allowing me to participate in your patient's care. [facility] Cardiac Electrophysiology  . [facility]    NOTE: This report was transcribed using voice recognition software. Every effort was made to ensure accuracy; however, inadvertent computerized transcription errors may be present. Addendum    Attempted to reprogram atrial lead but it neither senses or captures. Patient has underlying NSR with  ms. Recommend addition of new RA lead to help restore AV synchrony and to help with CHF    I have spent a total of 30  minutes with the patient and his/her family reviewing the above stated recommendations. A total of >50% of that time involved face-to-face time providing counseling and or coordination of care with the other providers.

## 2021-09-24 VITALS
BODY MASS INDEX: 39.07 KG/M2 | HEART RATE: 60 BPM | OXYGEN SATURATION: 98 % | HEIGHT: 67 IN | RESPIRATION RATE: 28 BRPM | TEMPERATURE: 98.6 F | DIASTOLIC BLOOD PRESSURE: 65 MMHG | SYSTOLIC BLOOD PRESSURE: 138 MMHG | WEIGHT: 248.9 LBS

## 2021-09-24 LAB
ANION GAP SERPL CALCULATED.3IONS-SCNC: 12 MMOL/L (ref 7–16)
BASOPHILS ABSOLUTE: 0.1 E9/L (ref 0–0.2)
BASOPHILS RELATIVE PERCENT: 1.4 % (ref 0–2)
BUN BLDV-MCNC: 29 MG/DL (ref 6–23)
CALCIUM SERPL-MCNC: 9.7 MG/DL (ref 8.6–10.2)
CHLORIDE BLD-SCNC: 96 MMOL/L (ref 98–107)
CO2: 26 MMOL/L (ref 22–29)
CREAT SERPL-MCNC: 1.1 MG/DL (ref 0.7–1.2)
EOSINOPHILS ABSOLUTE: 0.42 E9/L (ref 0.05–0.5)
EOSINOPHILS RELATIVE PERCENT: 5.9 % (ref 0–6)
GFR AFRICAN AMERICAN: >60
GFR NON-AFRICAN AMERICAN: >60 ML/MIN/1.73
GLUCOSE BLD-MCNC: 201 MG/DL (ref 74–99)
HCT VFR BLD CALC: 38.1 % (ref 37–54)
HEMOGLOBIN: 11.5 G/DL (ref 12.5–16.5)
IMMATURE GRANULOCYTES #: 0.09 E9/L
IMMATURE GRANULOCYTES %: 1.3 % (ref 0–5)
LYMPHOCYTES ABSOLUTE: 1.1 E9/L (ref 1.5–4)
LYMPHOCYTES RELATIVE PERCENT: 15.5 % (ref 20–42)
MAGNESIUM: 1.7 MG/DL (ref 1.6–2.6)
MCH RBC QN AUTO: 30.3 PG (ref 26–35)
MCHC RBC AUTO-ENTMCNC: 30.2 % (ref 32–34.5)
MCV RBC AUTO: 100.3 FL (ref 80–99.9)
METER GLUCOSE: 205 MG/DL (ref 74–99)
METER GLUCOSE: 205 MG/DL (ref 74–99)
MONOCYTES ABSOLUTE: 0.7 E9/L (ref 0.1–0.95)
MONOCYTES RELATIVE PERCENT: 9.8 % (ref 2–12)
NEUTROPHILS ABSOLUTE: 4.7 E9/L (ref 1.8–7.3)
NEUTROPHILS RELATIVE PERCENT: 66.1 % (ref 43–80)
PDW BLD-RTO: 14.3 FL (ref 11.5–15)
PHOSPHORUS: 3.6 MG/DL (ref 2.5–4.5)
PLATELET # BLD: 262 E9/L (ref 130–450)
PMV BLD AUTO: 9.7 FL (ref 7–12)
POTASSIUM SERPL-SCNC: 4.6 MMOL/L (ref 3.5–5)
RBC # BLD: 3.8 E12/L (ref 3.8–5.8)
SODIUM BLD-SCNC: 134 MMOL/L (ref 132–146)
WBC # BLD: 7.1 E9/L (ref 4.5–11.5)

## 2021-09-24 PROCEDURE — 84100 ASSAY OF PHOSPHORUS: CPT

## 2021-09-24 PROCEDURE — 85025 COMPLETE CBC W/AUTO DIFF WBC: CPT

## 2021-09-24 PROCEDURE — 94640 AIRWAY INHALATION TREATMENT: CPT

## 2021-09-24 PROCEDURE — 6370000000 HC RX 637 (ALT 250 FOR IP): Performed by: INTERNAL MEDICINE

## 2021-09-24 PROCEDURE — 99238 HOSP IP/OBS DSCHRG MGMT 30/<: CPT | Performed by: INTERNAL MEDICINE

## 2021-09-24 PROCEDURE — 36415 COLL VENOUS BLD VENIPUNCTURE: CPT

## 2021-09-24 PROCEDURE — 6360000002 HC RX W HCPCS: Performed by: INTERNAL MEDICINE

## 2021-09-24 PROCEDURE — 94660 CPAP INITIATION&MGMT: CPT

## 2021-09-24 PROCEDURE — 2700000000 HC OXYGEN THERAPY PER DAY

## 2021-09-24 PROCEDURE — 82962 GLUCOSE BLOOD TEST: CPT

## 2021-09-24 PROCEDURE — 6370000000 HC RX 637 (ALT 250 FOR IP): Performed by: NURSE PRACTITIONER

## 2021-09-24 PROCEDURE — 80048 BASIC METABOLIC PNL TOTAL CA: CPT

## 2021-09-24 PROCEDURE — 99233 SBSQ HOSP IP/OBS HIGH 50: CPT | Performed by: STUDENT IN AN ORGANIZED HEALTH CARE EDUCATION/TRAINING PROGRAM

## 2021-09-24 PROCEDURE — 83735 ASSAY OF MAGNESIUM: CPT

## 2021-09-24 PROCEDURE — 2580000003 HC RX 258: Performed by: INTERNAL MEDICINE

## 2021-09-24 RX ORDER — FUROSEMIDE 40 MG/1
40 TABLET ORAL 2 TIMES DAILY
Qty: 60 TABLET | Refills: 3 | Status: CANCELLED | OUTPATIENT
Start: 2021-09-24 | End: 2021-10-24

## 2021-09-24 RX ORDER — MAGNESIUM SULFATE 1 G/100ML
1000 INJECTION INTRAVENOUS ONCE
Status: COMPLETED | OUTPATIENT
Start: 2021-09-24 | End: 2021-09-24

## 2021-09-24 RX ORDER — BUMETANIDE 2 MG/1
2 TABLET ORAL DAILY
Qty: 30 TABLET | Refills: 3 | Status: SHIPPED | OUTPATIENT
Start: 2021-09-25 | End: 2021-11-22 | Stop reason: SDUPTHER

## 2021-09-24 RX ORDER — LEVOTHYROXINE SODIUM 0.15 MG/1
150 TABLET ORAL DAILY
Qty: 30 TABLET | Refills: 3 | Status: SHIPPED | OUTPATIENT
Start: 2021-09-25 | End: 2021-11-22

## 2021-09-24 RX ORDER — AMLODIPINE BESYLATE 5 MG/1
5 TABLET ORAL DAILY
Qty: 30 TABLET | Refills: 3 | Status: SHIPPED | OUTPATIENT
Start: 2021-09-25 | End: 2021-11-22 | Stop reason: SDUPTHER

## 2021-09-24 RX ORDER — BUMETANIDE 1 MG/1
2 TABLET ORAL DAILY
Status: DISCONTINUED | OUTPATIENT
Start: 2021-09-24 | End: 2021-09-24 | Stop reason: HOSPADM

## 2021-09-24 RX ADMIN — AMLODIPINE BESYLATE 5 MG: 5 TABLET ORAL at 09:47

## 2021-09-24 RX ADMIN — METOPROLOL SUCCINATE 25 MG: 25 TABLET, EXTENDED RELEASE ORAL at 09:47

## 2021-09-24 RX ADMIN — MAGNESIUM SULFATE HEPTAHYDRATE 1000 MG: 1 INJECTION, SOLUTION INTRAVENOUS at 09:25

## 2021-09-24 RX ADMIN — ASPIRIN 81 MG: 81 TABLET ORAL at 13:11

## 2021-09-24 RX ADMIN — ARFORMOTEROL TARTRATE 15 MCG: 15 SOLUTION RESPIRATORY (INHALATION) at 11:28

## 2021-09-24 RX ADMIN — IPRATROPIUM BROMIDE AND ALBUTEROL SULFATE 1 AMPULE: .5; 2.5 SOLUTION RESPIRATORY (INHALATION) at 11:29

## 2021-09-24 RX ADMIN — BUMETANIDE 2 MG: 1 TABLET ORAL at 13:11

## 2021-09-24 RX ADMIN — BUDESONIDE 250 MCG: 0.25 SUSPENSION RESPIRATORY (INHALATION) at 11:28

## 2021-09-24 RX ADMIN — INSULIN LISPRO 6 UNITS: 100 INJECTION, SOLUTION INTRAVENOUS; SUBCUTANEOUS at 06:33

## 2021-09-24 RX ADMIN — INSULIN LISPRO 5 UNITS: 100 INJECTION, SOLUTION INTRAVENOUS; SUBCUTANEOUS at 12:57

## 2021-09-24 RX ADMIN — LEVOTHYROXINE SODIUM 150 MCG: 0.1 TABLET ORAL at 06:33

## 2021-09-24 RX ADMIN — ATORVASTATIN CALCIUM 40 MG: 40 TABLET, FILM COATED ORAL at 13:11

## 2021-09-24 RX ADMIN — INSULIN LISPRO 6 UNITS: 100 INJECTION, SOLUTION INTRAVENOUS; SUBCUTANEOUS at 12:57

## 2021-09-24 RX ADMIN — Medication 10 ML: at 09:20

## 2021-09-24 ASSESSMENT — PAIN SCALES - GENERAL: PAINLEVEL_OUTOF10: 0

## 2021-09-24 NOTE — PROGRESS NOTES
Department of Internal Medicine  Nephrology Attending Consult Note    Events reviewed. SUBJECTIVE: We are following Mr. Rocha for diuretic management. Reports no complaints.     PHYSICAL EXAM:      Vitals:    VITALS:  /65   Pulse 60   Temp 98.6 °F (37 °C) (Temporal)   Resp 28   Ht 5' 7\" (1.702 m)   Wt 248 lb 14.4 oz (112.9 kg)   SpO2 98%   BMI 38.98 kg/m²   24HR INTAKE/OUTPUT:      Intake/Output Summary (Last 24 hours) at 9/24/2021 1301  Last data filed at 9/24/2021 0004  Gross per 24 hour   Intake --   Output 200 ml   Net -200 ml       Constitutional: Patient is awake, in no distress  HEENT: Pupils are equal reactive  Respiratory: Decreased breath sounds at bases  Cardiovascular/Edema: Heart sounds are regular  Gastrointestinal: Abdomen soft  Neurologic: Nonfocal  Skin: No lesions  Other: 1+ edema lower extremities, 1+ abdominal wall edema    Scheduled Meds:   insulin lispro  0-18 Units SubCUTAneous TID WC    insulin lispro  0-9 Units SubCUTAneous Nightly    insulin lispro  5 Units SubCUTAneous TID WC    bumetanide  2 mg Oral Daily    levothyroxine  150 mcg Oral Daily    amLODIPine  5 mg Oral Daily    apixaban  5 mg Oral BID    aspirin  81 mg Oral Daily    atorvastatin  40 mg Oral Daily    metoprolol succinate  25 mg Oral Daily    sodium chloride flush  10 mL IntraVENous 2 times per day    insulin glargine  25 Units SubCUTAneous Nightly    ipratropium-albuterol  1 ampule Inhalation Q4H WA    budesonide  250 mcg Nebulization BID    Arformoterol Tartrate  15 mcg Nebulization BID     Continuous Infusions:   dextrose      sodium chloride       PRN Meds:.miconazole, glucose, dextrose, glucagon (rDNA), dextrose, sodium chloride flush, sodium chloride, ondansetron **OR** ondansetron, polyethylene glycol, acetaminophen **OR** acetaminophen    DATA:    CBC:   Lab Results   Component Value Date    WBC 5.7 09/23/2021    RBC 3.33 09/23/2021    HGB 10.0 09/23/2021    HCT 32.0 09/23/2021    MCV 96.1 09/23/2021    MCH 30.0 09/23/2021    MCHC 31.3 09/23/2021    RDW 14.3 09/23/2021     09/23/2021    MPV 9.8 09/23/2021     CMP:    Lab Results   Component Value Date     09/24/2021    K 4.6 09/24/2021    K 5.5 09/18/2021    CL 96 09/24/2021    CO2 26 09/24/2021    BUN 29 09/24/2021    CREATININE 1.1 09/24/2021    GFRAA >60 09/24/2021    LABGLOM >60 09/24/2021    GLUCOSE 201 09/24/2021    GLUCOSE 133 04/18/2012    PROT 6.9 09/18/2021    LABALBU 3.9 09/18/2021    LABALBU 4.4 10/25/2011    CALCIUM 9.7 09/24/2021    BILITOT 0.5 09/18/2021    ALKPHOS 85 09/18/2021    AST 18 09/18/2021    ALT 20 09/18/2021     Magnesium:    Lab Results   Component Value Date    MG 1.7 09/24/2021     Phosphorus:    Lab Results   Component Value Date    PHOS 3.6 09/24/2021     Radiology Review:      CT chest without contrast 9/18/2021   Intervally new small to moderate left and trace right pleural effusions.       Mild thickening of secondary interlobular septa with ground-glass attenuation   at some levels, compatible with an element of interstitial pulmonary edema.       Patchy mild subsegmental atelectasis bilaterally, worse within the basal left   lower lobe and inferior lingula.       Multiple essentially stable pulmonary nodules bilaterally.       Intervally new small volume ascites and flank edema.       Mild upper abdominal lymphadenopathy.           Chest x-ray 9/17/2021   Left-sided pleural effusion.       Cardiomegaly.       Discrete patchy infiltrate in the left base questionable on the right. Cannot exclude bilateral pneumonia.  Please correlate clinically.           BRIEF SUMMARY OF INITIAL CONSULT:    Briefly Mr. Rocha is a 67-year-old  man with history of HTN, T2DM, history of ischemic ATN for which he required HD treatment for 4 days with adequate renal function recovery in May 2017, atrial fibrillation, NIKO, pacemaker placement, hypothyroidism, hyperlipidemia, status post back surgery x2, who was admitted on 9/17/2021 after he presented to the ER with shortness of breath. He was admitted with the diagnosis of decompensated congestive heart failure. Since admission he has received multiple doses of loop diuretics. His creatinine level on admission was 1.4 mg/dl and has remained relatively stable but his bicarbonate level has increased up to 34. He continues to have significant edema and we are consulted for diuretic management. Problems resolved:    · Mild elevated creatinine level without criteria of RADHA, due to decompensated heart failure, cardiorenal syndrome. · Hypomagnesemia, 2/2 diuretics. Mag level stable today    IMPRESSION/RECOMMENDATIONS:        1. HFpEF 55-60%, proBNP 1600> 1421, on acetazolamide bid X2 doses today. 2. Normal pH (pH: 7.422) with respiratory acidosis (PCO2: 49.1) and metabolic alkalosis (contraction alkalosis). Bicarbonate levels have largely improved after administration of acetazolamide, will restart loop diuretics today. 3. HTN, on amlodipine, metoprolol  ------------------------------------------------------------------  4. Atrial fibrillation, on metoprolol, apixaban  5. NIKO  6.  Normocytic anemia    Plan:    · Restart loop diuretics, bumex 2 mg po daily  · Continue to monitor renal function  · Continue to monitor electrolytes  · Okay to discharge from renal point of view    Electronically signed by JACQUELINE Hicks CNP on 9/24/2021 at 1:01 PM

## 2021-09-24 NOTE — CARE COORDINATION
Pt for RA lead replacement today with EP;  now on PO Bumex; discharge plan remains home with [facility] whom will provide transportation once medically stable.

## 2021-09-24 NOTE — DISCHARGE SUMMARY
[address]  Discharge Summary    PCP: Itzel Marin MD    Admit Date:9/17/2021  Discharge Date: 9/24/2021    Admission Diagnosis:   1. Acute Decompensated HFpEF (EF 60-65%, 10/2020), anasarca  2. Chronic hypoxic respiratory failure 2/2 obesity related restrictive lung disease plus acute decompensated HF, currently on 4L home O2  3. Left-sided pleural effusion, likely 2/2 HFpEF exacerbation   4. Persistent atrial fibrillation, on Toprol and Eliquis  5. Sinus node dysfunction s/p dual-chamber pacemaker  6. Elevated troponin  7. Essential hypertension  8. Acquired hypothyroidism, on Synthroid  9. Insulin-dependent type 2 DM  10. NIKO, on CPAP, noncompliant  11. Obesity, BMI 38    Discharge Diagnosis:  1. Acute Decompensated HFpEF (EF 55-60%, 9/20/21) - improved  2. Chronic hypoxic respiratory failure 2/2 obesity related restrictive lung disease plus acute decompensated HF, currently on 4L home - stable  3. Left-sided pleural effusion, likely 2/2 HFpEF exacerbation - improved  4. Persistent atrial fibrillation, on Toprol and Eliquis  5. Sinus node dysfunction s/p dual-chamber pacemaker  6. Pacemaker dysfunction - resolved  7. Elevated troponin - resolved  8. Essential hypertension  9. Acquired hypothyroidism, on synthroid  10. Insulin-dependent type 2 DM  11. NIKO, on CPAP, noncompliant  12. Obesity, BMI 38    Hospital Course:   Mr Heidi Wall is a 74 y.o. male with a past medical history of HFpEF, A. fib type 2 diabetes mellitus, hypertension, hyperlipidemia, hypothyroidism, restrictive lung disease and obstructive sleep apnea. Presented to the ED on 9/17 with concerns of increased shortness of breath and continued weight gain, Not improving since last PCP visit on 9/13. Patient was seen on 9/13 in the IM clinic, at that time his medication was adjusted, mainly Lasix was increased from 40 mg once daily to 40 mg twice daily.   According to the patient he was having increased swelling in both his legs and abdomen for the past several weeks, he also noticed increased weight gain of at least 20 pounds it has occurred over the last month. He states that his shortness of breath has worsened since noticing the increased swelling, he states that he was once able to walk up a flight of stairs without becoming short of breath however in the past month or so, he is unable to maintain his previous level of physical activity. Patient is now requiring 4 L of home O2. Patient states that his cough has been constant over the past month, he states that it is not productive of much sputum. In the ED, patient arrived with SOB and was started on 4L of O2. CXR showed L-sided pleural effusion, given 1 dose of Lasix in the ED which helped him. He had K level of 6.3 during admission for which he was Ca gluconate; however, his current K levels are still at 5.3-5.5. His current dosage of Lasix was continued His home regimen for HTN of lisinopril and HCTZ were discontinued due to hyperkalemia. On hospital day 3, patient has been improving. Leg swelling has decreased. However, his pressures were elevated. Patient was started on Amlodipine 5 mg daily, which helped in his pressure control. On hospital day 4, patient is still volume overloaded despite giving additional dose of 80 mg Lasix. Nephrology was consulted for diuretic management. Nephrology recommended to continue Lasix 40mg BID and gave 1 dose of acetazolamide. Also, patient's dose of levothyroxine was increased to 150mcg daily as his TSH was at 8.350. Patient has been clinically improving, with noticeable improvement in leg swelling and anasarca. On hospital day 6, patient was given 2 more doses of acetazolamide 250 mg IV and Lasix IV was discontinued. Patient was then switched to bumex 2mg Po daily. Also, there were concerns for pacemaker dysfunction as the overnight nurse observed the patient's ventricular paced rhythm firing randomly. EP was then consulted. Pacemaker interrogation done, decided patient would not need RA lead revision. Patient is for discharge today on stable condition. Explained to patient his condition and medication changes. Patient understands. All questions were answered. Patient's lisinopril may be resumed per PCP discretion.       Significant findings (history and exam, laboratory, radiological, pathology, other tests):   · General Appearance: alert, appears stated age, cooperative, no distress and morbidly obese  · HEENT:  Head: Normocephalic, no lesions, without obvious abnormality. · Eye: Normal external eye, conjunctiva, lids cornea, JELENA. · Ears: Normal TM's bilaterally. Normal auditory canals and external ears. Non-tender. · Nose: Normal external nose, mucus membranes and septum. · Neck: no adenopathy, no carotid bruit, no JVD, supple, symmetrical, trachea midline and thyroid not enlarged, symmetric, no tenderness/mass/nodules  · Lung: clear to auscultation bilaterally  · Heart: regular rate and rhythm, S1, S2 normal, no murmur, click, rub or gallop  · Abdomen: soft, non-tender; bowel sounds normal; no masses,  no organomegaly  · Extremities:  edema +1  · Musculokeletal: No joint swelling, no muscle tenderness. ROM normal in all joints of extremities. · Neurologic: Mental status: Alert, oriented, thought content appropriate    Pending test results:   1. None    Consults:  1. Nephrology  2. Electrophysiology    Procedures:  1.  None    Condition at discharge: Stable    Disposition: home    Discharge Medications:     Medication List      START taking these medications    amLODIPine 5 MG tablet  Commonly known as: NORVASC  Take 1 tablet by mouth daily  Start taking on: September 25, 2021     bumetanide 2 MG tablet  Commonly known as: BUMEX  Take 1 tablet by mouth daily  Start taking on: September 25, 2021        CHANGE how you take these medications    levothyroxine 150 MCG tablet  Commonly known as: SYNTHROID  Take 1 tablet by mouth daily  Start taking on: September 25, 2021  What changed:   · medication strength  · how much to take  · how to take this  · when to take this  · additional instructions        CONTINUE taking these medications    acetaminophen 500 MG tablet  Commonly known as: TYLENOL     apixaban 5 MG Tabs tablet  Commonly known as: Eliquis  Take 1 tablet by mouth 2 times daily     aspirin 81 MG EC tablet  Commonly known as: Aspir-Low  TAKE ONE TABLET BY MOUTH EVERY DAY     atorvastatin 40 MG tablet  Commonly known as: LIPITOR  TAKE ONE TABLET BY MOUTH DAILY     Basaglar KwikPen 100 UNIT/ML injection pen  Generic drug: insulin glargine  Inject 35 Units into the skin every morning PAP medication     Lancets Misc  Give Delica lancets. Tests twice a day A.M. And P.M     metFORMIN 1000 MG tablet  Commonly known as: GLUCOPHAGE  TAKE ONE TABLET BY MOUTH TWICE A DAY WITH MEALS     metoprolol succinate 25 MG extended release tablet  Commonly known as: TOPROL XL  TAKE ONE TABLET BY MOUTH DAILY     * Misc. Devices Kit  Dispense 1 blood pressure cuff. * Misc. Devices Misc  Please add portability to current O2 order. Resp to evaluate patient for OCD device. mometasone-formoterol 200-5 MCG/ACT inhaler  Commonly known as: Dulera  Inhale 2 puffs into the lungs 2 times daily Rinse mouth after using. PAP medication. naproxen sodium 220 MG tablet  Commonly known as: ANAPROX     Nebulizer Compressor Misc  Please provide patient with nebulizer and tubing needed to use equipment.      OneTouch Verio strip  Generic drug: blood glucose test strips  TEST IN THE MORNING AND IN THE EVENING     OXYGEN  Inhale 2.5 L into the lungs nightly     Pen Needles 32G X 6 MM Misc  2 times a day, in the morning and before bed     vitamin D3 25 MCG (1000 UT) Tabs tablet  Commonly known as: CHOLECALCIFEROL  TAKE TWO TABLETS BY MOUTH EVERY DAY     zoster recombinant adjuvanted vaccine 50 MCG/0.5ML Susr injection  Commonly known as: SHINGRIX  1 dose now and repeat in 2-6 months         * This list has 2 medication(s) that are the same as other medications prescribed for you. Read the directions carefully, and ask your doctor or other care provider to review them with you. STOP taking these medications    furosemide 40 MG tablet  Commonly known as: LASIX     hydroCHLOROthiazide 25 MG tablet  Commonly known as: HYDRODIURIL     lisinopril 40 MG tablet  Commonly known as: PRINIVIL;ZESTRIL     potassium chloride 20 MEQ extended release tablet  Commonly known as: KLOR-CON M           Where to Get Your Medications      These medications were sent to [facility] [place] [address]    Phone: [phone]   · amLODIPine 5 MG tablet  · bumetanide 2 MG tablet  · levothyroxine 150 MCG tablet  · Nebulizer Compressor Misc        Activity as tolerated  Diet: low sodium and diabetic  Be compliant with your medications and take them as prescribed.     Special Instructions: There are changes with your medications:  1. Please stop taking your lasix, lisinopril and hydrochlorothiazide  2. Start taking amlodipine 5mg daily  3. Start taking Bumetanide (Bumex) 2mg daily  4. Increase dose of Levothyroxine to 150mcg daily.     Dave Brown MD  PGY-1   3:43 PM 9/24/2021

## 2021-09-24 NOTE — PROGRESS NOTES
Cardiac Electrophysiology Inpatient Progress Note    Henrietta Nash  1955  Date of Service: 9/24/2021  PCP: Itzel Marin MD  Primary Electrophysiologist: Deja Chahal MD   Attending Electrophysiologist: Bryce Rosario DO         Subjective: Henrietta Nash is seen in hospital follow-up and management of: Abnormal pacemaker function. Silver Rocha has a past medical history of pacemaker in situ DOI 10/03/17 with the indication of sinus node dysfunction, persistent atrial fibrillation, NSVT, HTN, DM type 2, Restrictive lung disease, obesity, HLD, NIKO. He is known to Dr. Mccrary Later and is maintained on Lasix, Naproxen, KCL, HCTZ, synthroid, Lisinopril, Eliquis. [facility] on 09[facility]18[facility]21 with increasing SOB and weight gain, he was found to have decompensated CHF. His EKG showed a prolong 1st degree AV block and a bifascicular block. On the telemetry he was noted to have abnormal pacing behavior and EP was consulted. Due to his AAIR<->DDDR (MVP) pacing mode his WA interval in the mode was above 600ms, he was also noted to have poor atrial sensing on his device interrogation. Today he was noted to have continued poor Atrial sensing, but an acceptable threshold on 1.0mv @ 0.4 ms. Given the long AV delay (>600ms) and presentation with heart failure will change settings from MVP to DDDR with an AV delay of 300ms. Prior Cardiac testing:      ECG (09/23/21) sinus, RBBB WA 350ms, QRS 148ms, QTc 464ms   TTE (09/20/21): LV EF 55-60%. ECG (09/19/21): AP-VS, RBBB, LAFB with PVCs, WA 650ms, QRS 123ms, QTc 445ms. TTE (10/20/20): LV EF 60-65%, moderate LVH noted. TTE (10/07/19): LV EF 60-65%. 48 Hr Holter (09/20/17): sinus arrest with occasional ectopic atrial escape, sinus rate 58 bpm, occasional PACs and Frequent PVCs 21.5%.          Patient Active Problem List   Diagnosis    DM [facility]di[facility] mellitus) [facility]    Class 2 obesity due to excess calories with serious comorbidity and body mass index (BMI) of 38.0 to 38.9 in adult    Hyperlipidemia    Essential hypertension    Hypothyroidism    Lung nodules    Arthritis of shoulder region, left    OA (osteoarthritis of the spine)    S/P laminectomy with spinal fusion    Colorectal polyps    Diverticula of colon    NIKO (obstructive sleep apnea)    Hypoxemia    Restrictive lung disease secondary to obesity    Atypical atrial flutter (HCC)    Sinus node dysfunction (HCC)    Pacemaker    Persistent atrial fibrillation (HCC)    Diabetes mellitus type 2 in obese (HCC)    Obesity    Chronic obstructive pulmonary disease, unspecified COPD type (HCC)    Acute heart failure with preserved ejection fraction (HFpEF) (HCC)    Chronic hypercapnic respiratory failure (HCC)         Scheduled Medications:   insulin lispro  0-18 Units SubCUTAneous TID WC    insulin lispro  0-9 Units SubCUTAneous Nightly    insulin lispro  5 Units SubCUTAneous TID WC    levothyroxine  150 mcg Oral Daily    amLODIPine  5 mg Oral Daily    apixaban  5 mg Oral BID    aspirin  81 mg Oral Daily    atorvastatin  40 mg Oral Daily    metoprolol succinate  25 mg Oral Daily    sodium chloride flush  10 mL IntraVENous 2 times per day    insulin glargine  25 Units SubCUTAneous Nightly    ipratropium-albuterol  1 ampule Inhalation Q4H WA    budesonide  250 mcg Nebulization BID    Arformoterol Tartrate  15 mcg Nebulization BID       Infusion Medications:   dextrose      sodium chloride         PRN Medications:  miconazole, glucose, dextrose, glucagon (rDNA), dextrose, sodium chloride flush, sodium chloride, ondansetron **OR** ondansetron, polyethylene glycol, acetaminophen **OR** acetaminophen    No Known Allergies    Wt Readings from Last 3 Encounters:   09/23/21 248 lb 14.4 oz (112.9 kg)   09/13/21 265 lb 8 oz (120.4 kg)   06/14/21 243 lb (110.2 kg)     Temp Readings from Last 3 Encounters:   09/24/21 98.6 °F (37 °C) (Temporal)   09/13/21 98.3 °F (36.8 °C) (Oral)   06/14/21 97.7 °F (36.5 °C) (Oral)     BP Readings from Last 3 Encounters:   09/24/21 138/65   09/13/21 137/66   06/14/21 138/68     Pulse Readings from Last 3 Encounters:   09/24/21 60   09/13/21 59   06/14/21 59         Intake/Output Summary (Last 24 hours) at 9/24/2021 1121  Last data filed at 9/24/2021 0004  Gross per 24 hour   Intake 180 ml   Output 200 ml   Net -20 ml       ROS:   Constitutional: Negative for fever,  and appetite change. + for activity change   HENT: Negative for epistaxis, difficulty swallowing. Eyes: Negative for blurred vision or double vision. Respiratory: Negative for cough, chest tightness, and wheezing. + for shortness for breath   Cardiovascular: Negative for chest pain, palpitations and + for leg swelling. Gastrointestinal: Negative for abdominal pain, heartburn, or blood in stool. Genitourinary: Negative for hematuria, burning or frequency. Musculoskeletal: Negative for myalgias, stiffness, or swelling. Skin: Negative for rash, pain, or lumps. Neurological: Negative for syncope, seizures, or headaches. Psychiatric/Behavioral: Negative for confusion and agitation. The patient is not nervous/anxious. PHYSICAL EXAM:  Vitals:    09/23/21 1545 09/23/21 2042 09/23/21 2100 09/24/21 0947   BP: 128/62  130/60 138/65   Pulse: 64  60 60   Resp: 17  21 18   Temp: 98 °F (36.7 °C)  97.1 °F (36.2 °C) 98.6 °F (37 °C)   TempSrc: Oral  Temporal Temporal   SpO2: 94% 95% 97% 94%   Weight:       Height:         \Constitutional: Obese  Head: Normocephalic and atraumatic. Eyes: Conjunctivae are normal.   Neck:  Unable to assess JVD present. Cardiovascular: S1 normal, S2 normal and intact distal pulses. A regular rhythm present. Pulmonary/Chest: Effort normal and breath sounds normal. No respiratory distress. Abdominal: Soft. Obese  Musculoskeletal: Normal range of motion present, no muscle weakness. Neurological: Alert and oriented to person, place, and time. No focal findings on my exam  Skin: Skin is warm and dry.  No bruising, no ecchymosis and no rash noted. Extremity: No visible clubbing or cyanosis. Pos edema. Psychiatric: Normal mood and affect. Thought content normal  Device site: left chest, no erosion, no migration, well healed. Pertinent Labs:  CBC:   Recent Labs     09/22/21  0637 09/23/21  0437   WBC 6.8 5.7   HGB 11.6* 10.0*   HCT 36.5* 32.0*    214     BMP:  Recent Labs     09/22/21  0637 09/23/21  0437 09/24/21  0630    138 134   K 4.2 4.0 4.6   CL 95* 92* 96*   CO2 34* 39* 26   BUN 30* 33* 29*   CREATININE 1.2 1.3* 1.1   CALCIUM 10.3* 10.0 9.7       TSH:   Lab Results   Component Value Date    TSH 8.350 (H) 09/20/2021        Lipid Profile:   Lab Results   Component Value Date    TRIG 165 06/14/2021    HDL 24 06/14/2021    LDLCALC 63 06/14/2021    CHOL 120 06/14/2021          Telemetry9/24/2021: Ap-VS with a prolonged DC     Device Interrogation/Reprogramming  Make/Model: [address]  DOI: 10/5/17  Battery: 3.01 V 5.5 years   Juany Alves therapy: AAIR<-> DDDR   Pacing %: RA = 96%, RV = 7.9%  Lead function:  RA lead: sensing = 0.9 mV, impedance = 476 ohms, threshold = 1.0 V @ 0.4 msec  RV lead: sensing = 2.5 mV, impedance = 342 ohms, threshold = 1.25 V @ 0.4 msec  Lead programming:  RA lead: sensitivity = 0.15 mV, output = 2.0 V @ 0.4 msec  RV lead: sensitivity = 1.20 mV, output = 2.0 V @ 0.4 msec  Arrhythmias: Prolonged AV conduction with a DC of 600ms   Reprogramming included: from AAIR<->DDDR to DDDR AV delay made 300ms   Overall device function is normal  All device programmable settings were evaluated per above and in the scanned document, along with iterative adjustments (capture thresholds) to assess and select the most appropriate final programming to provide for consistent delivery of the appropriate therapy and to verify function of the device. I have independently reviewed all of the ECGs and rhythm strips per above.     I have personally reviewed the laboratory, cardiac diagnostic Statement  I was present with the nurse practitioner during the history and exam. I discussed the findings and plans with the nurse practitioner and agree as documented in his note     Electronically signed by Roberto Villavicencio DO on 9/24/21 at 2:53 PM EDT

## 2021-09-24 NOTE — ADT AUTH CERT
Utilization Reviews       Heart Failure - Care Day 8 (9/24/2021) by Tyra Galicia RN       Review Status Review Entered   Completed 9/24/2021 13:22      Criteria Review      Care Day: 8 Care Date: 9/24/2021 Level of Care: Intermediate Care    Guideline Day 2    Level Of Care    ( ) Floor    9/24/2021 1:22 PM EDT by Cindy Clark      [facility]eva    Clinical Status    (X) * Hemodynamic stability    (X) * Mental status at baseline    (X) * No evidence of myocardial ischemia    (X) * Cardiac rate and rhythm acceptable    ( ) * Oxygenation at baseline or improved    9/24/2021 1:22 PM EDT by Cindy Clark      o2 4 l    (X) * Pulmonary edema absent or improved    Interventions    (X) * Pulmonary catheter absent    * Milestone   Additional Notes   9/24 IMCU    O2 4 L nc on oximetry- see med list below        09/24/21 0947  98.6 (37)  18  60  138/65  -  Sitting  -  4  94  Nasal ilia       Labs   Bs 205   Sodium 134 mmol/L      Potassium 4.6 mmol/L      Chloride 96 mmol/L      CO2 26 mmol/L      Anion Gap 12 mmol/L      Glucose 201 mg/dL      BUN 29             EP notes      Prior Cardiac testing:        · ECG (09/23/21) sinus, RBBB NM 350ms, QRS 148ms, QTc 464ms    · TTE (09/20/21): LV EF 55-60%.              Scheduled Medications:   Scheduled MedicationsExpand by Default   · insulin lispro 0-18 Units SubCUTAneous TID WC   · insulin lispro 0-9 Units SubCUTAneous Nightly   · insulin lispro 5 Units SubCUTAneous TID WC   · levothyroxine 150 mcg Oral Daily   · amLODIPine 5 mg Oral Daily   · apixaban 5 mg Oral BID   · aspirin 81 mg Oral Daily   · atorvastatin 40 mg Oral Daily   · metoprolol succinate 25 mg Oral Daily   · sodium chloride flush 10 mL IntraVENous 2 times per day   · insulin glargine 25 Units SubCUTAneous Nightly   · ipratropium-albuterol 1 ampule Inhalation Q4H WA   · budesonide 250 mcg Nebulization BID   · Arformoterol Tartrate 15 mcg Nebulization BID           add bumex 2 mg  qd po-- Iv mag 1 g x1   Infusion Medications:   Infusions Meds   · dextrose    · sodium chloride               PRN Medications:   PRN Medications   miconazole, glucose, dextrose, glucagon (rDNA), dextrose, sodium chloride flush, sodium chloride, ondansetron **OR** ondansetron, polyethylene glycol, acetaminophen **OR** acetaminophen          \Constitutional: Obese   Head: Normocephalic and atraumatic. Eyes: Conjunctivae are normal.    Neck:  Unable to assess JVD present. Cardiovascular: S1 normal, S2 normal and intact distal pulses. A regular rhythm present. Pulmonary/Chest: Effort normal and breath sounds normal. No respiratory distress. Abdominal: Soft. Obese   Musculoskeletal: Normal range of motion present, no muscle weakness. Neurological: Alert and oriented to person, place, and time. No focal findings on my exam   Skin: Skin is warm and dry. No bruising, no ecchymosis and no rash noted. Extremity: No visible clubbing or cyanosis.  Pos edema. Psychiatric: Normal mood and affect. Thought content normal   Device site: left chest, no erosion, no migration, well healed         Telemetry9/24/2021: Ap-VS with a prolonged ME        Device Interrogation/Reprogramming   · Make/Model: Chelsie [address]   · DOI: 10/5/17   · Battery: 3.01 V 5.5 years    · Trenda Crews therapy: AAIR<-> DDDR    · Pacing %: RA = 96%, RV = 7.9%   · Lead function:   ? RA lead: sensing = 0.9 mV, impedance = 476 ohms, threshold = 1.0 V @ 0.4 msec   ? RV lead: sensing = 2.5 mV, impedance = 342 ohms, threshold = 1.25 V @ 0.4 msec   · Lead programming:   ? RA lead: sensitivity = 0.15 mV, output = 2.0 V @ 0.4 msec   ?  RV lead: sensitivity = 1.20 mV, output = 2.0 V @ 0.4 msec   · Arrhythmias: Prolonged AV conduction with a ME of 600ms    · Reprogramming included: from AAIR<->DDDR to DDDR AV delay made 300ms    · Overall device function is normal   · All device programmable settings were evaluated per above and in the scanned document, along with iterative Exacerbation of HFpEF (EF 55-60%, echo 09/23/2021)   - On admission, patient stated that he has had worsening SOB and swelling of his abdomen and lower extremities over the past month. - BNP noted at 1600. Repeat BNP on 9/21 is 1421.   - Troponin elevated on admission, likely 2/2 to supply demand mismatch of HF exacerbation.    - Echo (9/20/21) limited visualization but shows EF 55-60%, normal left ventricular function, absence of wall abnormalities. - Patient seen by CHF RN and received dietary education and instruction for outpatient follow-up. - Discontinue Lasix 40 mg BID IV per nephrology. Patient appears to be improving.    - Diamox 250 mg x2 dose received 9/23.    - Plan to switch to oral lasix per nephrology prior to pending discharge today. - Patient appears to be at baseline weight and subjective return to baseline distension of abdomen.   - Continue to monitor fluid balance with strict I/O's and daily weights until discharge. - Patient may benefit from stress test outpatient in the future.    - Given limited visualization on echo, patient may benefit from right heart catheterization for right ventricular function assessment in the future. - Pacer noted to have been randomly firing. EP saw patient 9/23 and attempted interrogation. EP changed to DDDR mode on 9/24 and has canceled initial planned RA lead revision.       2. Chronic Respiratory Failure 2/2 Obstructive Sleep Apnea, Restrictive Lung Disease, and Fluid Overload   - Previous CXR (9/18) noted mild/moderate left sided pleural effusion and trace right sided effusion.   - On exam today, lung sounds are CTAB. - Continue 4L of home O2 via NC.    - Continue DUONEB, Brovana, and Pulmicort. - On ambulatory pulse oximetry, patient ambulated 20ft and dropped to SpO2 of 89% before returning back to 96% at rest with O2.       3. Contraction Alkalosis   - Patient has been receiving lasix diuretic therapy for fluid overload.    - Patient may have slight compensatory elevation at baseline given restrictive lung disease.   - Monitor for resolution following discontinuation of lasix IV diuresis and two-time dose of Diamox therapy 9/23.       4. Persistent Atrial Fibrillation S/P Dual Chamber Pacemaker   - On exam, patient appears in NSR.   - EKG noted possible concern for delay with pacing on admission.   - Continue Eliquis, metoprolol, and ASA. - Continue telemetry. - Will follow-up on any recommendations per EP.       5. Hyperkalemia - Resolved   - K+ at 6.3 during admission. Today K+ is 4.6. Monitor BMP. - Patient takes potassium phos at home. Given hyperkalemia on previous regimen. Patient will be sent home with recommendations to discontinue potassium supplementation. Home meds will be reassessed for discharge. - Continue cardiac/potassium-restricted diet.       6. Hypertension   - History of hypertension with home meds held (HCTZ and ACE-i). - Systolic BP has remained within target range. - Amlodipine 5 mg continued.       7. Abdominal Rash (Right-sided)   - Patient complaining of right-sided pruritic rash along right waste line. - Today, pruritus is improving.   - Continue miconazole ointment.       8. Obstructive Sleep Apnea   - Patient continues to refuse CPAP at night due to discomfort. - Patient states that he does not use CPAP machine as recommended at home.       9. Diabetes Mellitus Type II   - Has remained relatively controlled with several episodes of BG >200.   - Continue Lantus and high-dose sliding scale insulin       10. Hx of Hyperlipidemia   - Continue home Lipitor 40 mg daily.       11. Hx of Hypothyroidism   - TSH elevated today at 8.35.   - Synthroid readjusted to 150 mcg daily.       12. Full Code       PT/OT evaluation: Evaluated 9/21, [facility] 20/24   DVT prophylaxis/ GI prophylaxis: Eliquis/            NEPHRO note   BRIEF SUMMARY OF INITIAL CONSULT:       Briefly Mr. Rocha is a 77-year-old  man with history of HTN, T2DM, history of ischemic ATN for which he required HD treatment for 4 days with adequate renal function recovery in May 2017, atrial fibrillation, NIKO, pacemaker placement, hypothyroidism, hyperlipidemia, status post back surgery x2, who was admitted on 9/17/2021 after he presented to the ER with shortness of breath.  He was admitted with the diagnosis of decompensated congestive heart failure.  Since admission he has received multiple doses of loop diuretics. His creatinine level on admission was 1.4 mg/dl and has remained relatively stable but his bicarbonate level has increased up to 29.  He continues to have significant edema and we are consulted for diuretic management.       Problems resolved:       · Mild elevated creatinine level without criteria of RADHA, due to decompensated heart failure, cardiorenal syndrome. · Hypomagnesemia, 2/2 diuretics. Mag level stable today       IMPRESSION/RECOMMENDATIONS:             1. HFpEF 55-60%, proBNP 1600> 1421, on acetazolamide bid X2 doses today.       2. Normal pH (pH: 7.422) with respiratory acidosis (PCO2: 84.0) and metabolic alkalosis (contraction alkalosis).  Bicarbonate levels have largely improved after administration of acetazolamide, will restart loop diuretics today. 3. HTN, on amlodipine, metoprolol   ------------------------------------------------------------------   4. Atrial fibrillation, on metoprolol, apixaban   5. NIKO   6.  Normocytic anemia       Plan:       · Restart loop diuretics, bumex 2 mg po daily   · Continue to monitor renal function   · Continue to monitor electrolytes

## 2021-09-24 NOTE — CARE COORDINATION
Update discussed with EP no lead replacement required; messaged nephrology via perfect-serve to check for discharge from their POV, awaiting response. Addendum: discharge, okay from nephrology's POV. Messaged IM team 1 resident Dr. Jesus Yang to check for discharge, via perfect-serve awaiting response. Addendum: per Dr. Jesus Yang they are working on the discharge now.

## 2021-09-24 NOTE — PROGRESS NOTES
[facility]  Internal Medicine Residency Program  Progress Note - House Team 1    Patient:  Willi Aguilar 74 y.o. male MRN: [id number]     Date of Service: 9/24/2021     CC: Shortness of breath  Overnight events: NAEO    Subjective     Patient seen and assessed at bedside. NAEO. He describes improved breathing and decreased leg swelling. He states that he \"feels great. \" He denies fatigue, headache, chest pain, shortness of breath. He describes no new complaints. Electrophysiology saw patient and changed pacer settings. No lead replacement recommended at this time. Objective     Vitals: /65   Pulse 60   Temp 98.6 °F (37 °C) (Temporal)   Resp 28   Ht 5' 7.01\" (1.702 m)   Wt 248 lb 14.4 oz (112.9 kg)   SpO2 98%   BMI 38.97 kg/m²     I & O - 24hr: No intake/output data recorded. Physical Exam  Constitutional:       General: He is not in acute distress. Appearance: Normal appearance. He is obese. HENT:      Head: Normocephalic and atraumatic. Mouth/Throat:      Mouth: Mucous membranes are moist.      Pharynx: Oropharynx is clear. Eyes:      Extraocular Movements: Extraocular movements intact. Pupils: Pupils are equal, round, and reactive to light. Cardiovascular:      Rate and Rhythm: Normal rate and regular rhythm. Heart sounds: Normal heart sounds. Pulmonary:      Effort: Pulmonary effort is normal.      Breath sounds: Normal breath sounds. Abdominal:      General: Bowel sounds are normal. There is distension. Palpations: Abdomen is soft. Tenderness: There is no abdominal tenderness. Comments: Patient describes baseline distension. Musculoskeletal:         General: No tenderness. Normal range of motion. Cervical back: Normal range of motion and neck supple. Right lower leg: Edema present. Left lower leg: Edema present.       Comments: +1 non-pitting bilateral lower extremity edema   Skin:     General: Skin is warm and dry.      Capillary Refill: Capillary refill takes less than 2 seconds. Neurological:      General: No focal deficit present. Mental Status: He is alert and oriented to person, place, and time. Mental status is at baseline.    Psychiatric:         Mood and Affect: Mood normal.     Subject  Pertinent Labs & Imaging Studies   carmela  CBC with Differential:    Lab Results   Component Value Date    WBC 5.7 09/23/2021    RBC 3.33 09/23/2021    HGB 10.0 09/23/2021    HCT 32.0 09/23/2021     09/23/2021    MCV 96.1 09/23/2021    MCH 30.0 09/23/2021    MCHC 31.3 09/23/2021    RDW 14.3 09/23/2021    SEGSPCT 68 04/27/2011    LYMPHOPCT 16.7 09/23/2021    MONOPCT 12.0 09/23/2021    EOSPCT 7 10/05/2010    BASOPCT 1.2 09/23/2021    MONOSABS 0.69 09/23/2021    LYMPHSABS 0.96 09/23/2021    EOSABS 0.31 09/23/2021    BASOSABS 0.07 09/23/2021     BMP:    Lab Results   Component Value Date     09/24/2021    K 4.6 09/24/2021    K 5.5 09/18/2021    CL 96 09/24/2021    CO2 26 09/24/2021    BUN 29 09/24/2021    LABALBU 3.9 09/18/2021    LABALBU 4.4 10/25/2011    CREATININE 1.1 09/24/2021    CALCIUM 9.7 09/24/2021    GFRAA >60 09/24/2021    LABGLOM >60 09/24/2021    GLUCOSE 201 09/24/2021    GLUCOSE 133 04/18/2012     Magnesium:    Lab Results   Component Value Date    MG 1.7 09/24/2021     Phosphorus:    Lab Results   Component Value Date    PHOS 3.6 09/24/2021     Imaging:    CT CHEST WO CONTRAST   Final Result   Intervally new small to moderate left and trace right pleural effusions.       Mild thickening of secondary interlobular septa with ground-glass attenuation   at some levels, compatible with an element of interstitial pulmonary edema.       Patchy mild subsegmental atelectasis bilaterally, worse within the basal left   lower lobe and inferior lingula.       Multiple essentially stable pulmonary nodules bilaterally.       Intervally new small volume ascites and flank edema.       Mild upper abdominal lymphadenopathy.       RECOMMENDATIONS:   Multiple pulmonary nodules. Most severe: 5 mm solid pulmonary nodule. No   routine follow-up imaging is recommended. These guidelines do not apply to immunocompromised patients and patients with   cancer. Follow up in patients with significant comorbidities as clinically   warranted. For lung cancer screening, adhere to Lung-RADS guidelines. Reference: Radiology. 2017; 284(1):228-43.           XR CHEST (2 VW)   Final Result   Left-sided pleural effusion.       Cardiomegaly.       Discrete patchy infiltrate in the left base questionable on the right. Cannot exclude bilateral pneumonia. Please correlate clinically. Resident's Assessment and Plan     Jose Martinez a 74 y. o. male with a past medical history of HFpEF (EF of 60-65% per echo 10/2021), persistent atrial fibrillation (S/P dual chamber pacemaker), DMII, HTN, HLD, hypothyroidism, restrictive lung disease (on 4L of home O2), and NIKO (does not use home CPAP) that presented to the ED on 9/17 with worsening SOB and anasarca. He was admitted to the house floors for acute exacerbation of HFpEF.     1. Acute Exacerbation of HFpEF (EF 55-60%, echo 09/23/2021)  - On admission, patient stated that he has had worsening SOB and swelling of his abdomen and lower extremities over the past month. - BNP noted at 1600. Repeat BNP on 9/21 is 1421.  - Troponin elevated on admission, likely 2/2 to supply demand mismatch of HF exacerbation.   - Echo (9/20/21) limited visualization but shows EF 55-60%, normal left ventricular function, absence of wall abnormalities. - Patient seen by CHF RN and received dietary education and instruction for outpatient follow-up. - Discontinue Lasix 40 mg BID IV per nephrology. Patient appears to be improving.   - Diamox 250 mg x2 dose received 9/23.   - Plan to switch to oral lasix per nephrology prior to pending discharge today.   - Patient appears to be at baseline weight and subjective return to baseline distension of abdomen.  - Continue to monitor fluid balance with strict I/O's and daily weights until discharge. - Patient may benefit from stress test outpatient in the future.   - Given limited visualization on echo, patient may benefit from right heart catheterization for right ventricular function assessment in the future. - Pacer noted to have been randomly firing. EP saw patient 9/23 and attempted interrogation. EP changed to DDDR mode on 9/24 and has canceled initial planned RA lead revision.     2. Chronic Respiratory Failure 2/2 Obstructive Sleep Apnea, Restrictive Lung Disease, and Fluid Overload  - Previous CXR (9/18) noted mild/moderate left sided pleural effusion and trace right sided effusion.  - On exam today, lung sounds are CTAB. - Continue 4L of home O2 via NC.   - Continue DUONEB, Brovana, and Pulmicort. - On ambulatory pulse oximetry, patient ambulated 20ft and dropped to SpO2 of 89% before returning back to 96% at rest with O2.     3. Contraction Alkalosis  - Patient has been receiving lasix diuretic therapy for fluid overload. - Patient may have slight compensatory elevation at baseline given restrictive lung disease.  - Monitor for resolution following discontinuation of lasix IV diuresis and two-time dose of Diamox therapy 9/23.     4. Persistent Atrial Fibrillation S/P Dual Chamber Pacemaker  - On exam, patient appears in NSR.  - EKG noted possible concern for delay with pacing on admission.  - Continue Eliquis, metoprolol, and ASA. - Continue telemetry. - Will follow-up on any recommendations per EP.     5. Hyperkalemia - Resolved  - K+ at 6.3 during admission. Today K+ is 4.6. Monitor BMP. - Patient takes potassium phos at home. Given hyperkalemia on previous regimen. Patient will be sent home with recommendations to discontinue potassium supplementation. Home meds will be reassessed for discharge. - Continue cardiac/potassium-restricted diet.     6. Hypertension  - History of hypertension with home meds held (HCTZ and ACE-i). - Systolic BP has remained within target range. - Amlodipine 5 mg continued.     7. Abdominal Rash (Right-sided)  - Patient complaining of right-sided pruritic rash along right waste line. - Today, pruritus is improving.  - Continue miconazole ointment.     8. Obstructive Sleep Apnea  - Patient continues to refuse CPAP at night due to discomfort. - Patient states that he does not use CPAP machine as recommended at home.     9. Diabetes Mellitus Type II  - Has remained relatively controlled with several episodes of BG >200.  - Continue Lantus and high-dose sliding scale insulin     10. Hx of Hyperlipidemia  - Continue home Lipitor 40 mg daily.     11. Hx of Hypothyroidism  - TSH elevated today at 8.35.  - Synthroid readjusted to 150 mcg daily.     12.  Full Code     PT/OT evaluation: Evaluated 9/21, [facility] 20/24  DVT prophylaxis/ GI prophylaxis: Eliquis/Diet  Disposition: Pending discharge     Tank Wagner  Attending physician: Adria Curling

## 2021-09-24 NOTE — PROGRESS NOTES
Comprehensive Nutrition Assessment    Type and Reason for Visit:  Initial    Nutrition Recommendations/Plan: Continue current diet    Nutrition Assessment:  Pt admit w/ CHF exacerbation. Hx DM, COPD. Noted large wt loss since admit 2/2 fluid loss w/ diuretic therapy. PO intakes good. Will continue to monitor. Malnutrition Assessment:  Malnutrition Status:  No malnutrition    Context:  Chronic Illness     Findings of the 6 clinical characteristics of malnutrition:  Energy Intake:  No significant decrease in energy intake  Weight Loss:  Unable to assess     Body Fat Loss:  No significant body fat loss     Muscle Mass Loss:  No significant muscle mass loss    Fluid Accumulation:  No significant fluid accumulation     Strength:  Not Performed    Nutrition Related Findings:  A&Ox4, active BS, abd distention, +2 edema, -I/Os      Wounds:  None       Current Nutrition Therapies:    ADULT DIET; Regular; Low Fat/Low Chol/High Fiber/2 gm Na    Anthropometric Measures:  · Height: 5' 7\" (170.2 cm)  · Current Body Weight: 248 lb (112.5 kg) (9/23 bed)   · Admission Body Weight: 264 lb (119.7 kg) (9/19 actual)    · Usual Body Weight: 265 lb (120.2 kg) (9/13/21 actual per EMR , no longterm wt hx on file)     · Ideal Body Weight: 148 lbs; % Ideal Body Weight 167.6 %   · BMI: 38.8  · BMI Categories: Obese Class 2 (BMI 35.0 -39.9)       Nutrition Diagnosis:   No nutrition diagnosis at this time     Nutrition Interventions:   Food and/or Nutrient Delivery:  Continue Current Diet  Nutrition Education/Counseling:  Education not indicated   Coordination of Nutrition Care:  Continue to monitor while inpatient    Goals:  pt to consume >75% meals       Nutrition Monitoring and Evaluation:   Food/Nutrient Intake Outcomes:  Food and Nutrient Intake  Physical Signs/Symptoms Outcomes:  Biochemical Data, GI Status, Fluid Status or Edema, Nutrition Focused Physical Findings, Skin, Weight     Discharge Planning:     Too soon to determine Electronically signed by Cathleen Marie MS, RD, LD on 9/24/21 at 1:00 PM EDT    Contact: [phone]

## 2021-09-27 ENCOUNTER — OFFICE VISIT (OUTPATIENT)
Dept: INTERNAL MEDICINE | Age: 66
End: 2021-09-27
Payer: MEDICARE

## 2021-09-27 ENCOUNTER — CARE COORDINATION (OUTPATIENT)
Dept: CASE MANAGEMENT | Age: 66
End: 2021-09-27

## 2021-09-27 VITALS
TEMPERATURE: 97.5 F | HEART RATE: 88 BPM | DIASTOLIC BLOOD PRESSURE: 64 MMHG | SYSTOLIC BLOOD PRESSURE: 115 MMHG | HEIGHT: 67 IN | WEIGHT: 228.1 LBS | BODY MASS INDEX: 35.8 KG/M2 | OXYGEN SATURATION: 96 % | RESPIRATION RATE: 20 BRPM

## 2021-09-27 DIAGNOSIS — I50.32 CHRONIC HEART FAILURE WITH PRESERVED EJECTION FRACTION (HCC): Primary | ICD-10-CM

## 2021-09-27 DIAGNOSIS — I48.19 PERSISTENT ATRIAL FIBRILLATION (HCC): ICD-10-CM

## 2021-09-27 DIAGNOSIS — Z79.4 TYPE 2 DIABETES MELLITUS WITH DIABETIC AUTONOMIC NEUROPATHY, WITH LONG-TERM CURRENT USE OF INSULIN (HCC): ICD-10-CM

## 2021-09-27 DIAGNOSIS — Z79.4 TYPE 2 DIABETES MELLITUS WITH HYPERGLYCEMIA, WITH LONG-TERM CURRENT USE OF INSULIN (HCC): ICD-10-CM

## 2021-09-27 DIAGNOSIS — Z79.01 ANTICOAGULATED: ICD-10-CM

## 2021-09-27 DIAGNOSIS — M62.830 SPASM OF BACK MUSCLES: ICD-10-CM

## 2021-09-27 DIAGNOSIS — E03.9 ACQUIRED HYPOTHYROIDISM: ICD-10-CM

## 2021-09-27 DIAGNOSIS — E11.65 TYPE 2 DIABETES MELLITUS WITH HYPERGLYCEMIA, WITH LONG-TERM CURRENT USE OF INSULIN (HCC): ICD-10-CM

## 2021-09-27 DIAGNOSIS — E11.43 TYPE 2 DIABETES MELLITUS WITH DIABETIC AUTONOMIC NEUROPATHY, WITH LONG-TERM CURRENT USE OF INSULIN (HCC): ICD-10-CM

## 2021-09-27 LAB — HBA1C MFR BLD: 6.7 %

## 2021-09-27 PROCEDURE — G8417 CALC BMI ABV UP PARAM F/U: HCPCS | Performed by: INTERNAL MEDICINE

## 2021-09-27 PROCEDURE — 1123F ACP DISCUSS/DSCN MKR DOCD: CPT | Performed by: INTERNAL MEDICINE

## 2021-09-27 PROCEDURE — 99213 OFFICE O/P EST LOW 20 MIN: CPT | Performed by: INTERNAL MEDICINE

## 2021-09-27 PROCEDURE — 3017F COLORECTAL CA SCREEN DOC REV: CPT | Performed by: INTERNAL MEDICINE

## 2021-09-27 PROCEDURE — 1111F DSCHRG MED/CURRENT MED MERGE: CPT | Performed by: INTERNAL MEDICINE

## 2021-09-27 PROCEDURE — 99214 OFFICE O/P EST MOD 30 MIN: CPT | Performed by: INTERNAL MEDICINE

## 2021-09-27 PROCEDURE — 2022F DILAT RTA XM EVC RTNOPTHY: CPT | Performed by: INTERNAL MEDICINE

## 2021-09-27 PROCEDURE — 1036F TOBACCO NON-USER: CPT | Performed by: INTERNAL MEDICINE

## 2021-09-27 PROCEDURE — G8427 DOCREV CUR MEDS BY ELIG CLIN: HCPCS | Performed by: INTERNAL MEDICINE

## 2021-09-27 PROCEDURE — 3044F HG A1C LEVEL LT 7.0%: CPT | Performed by: INTERNAL MEDICINE

## 2021-09-27 PROCEDURE — 83036 HEMOGLOBIN GLYCOSYLATED A1C: CPT | Performed by: INTERNAL MEDICINE

## 2021-09-27 PROCEDURE — 4040F PNEUMOC VAC/ADMIN/RCVD: CPT | Performed by: INTERNAL MEDICINE

## 2021-09-27 RX ORDER — ATORVASTATIN CALCIUM 40 MG/1
TABLET, FILM COATED ORAL
Qty: 90 TABLET | Refills: 1 | Status: SHIPPED
Start: 2021-09-27 | End: 2021-11-22 | Stop reason: SDUPTHER

## 2021-09-27 RX ORDER — BLOOD SUGAR DIAGNOSTIC
STRIP MISCELLANEOUS
Qty: 100 EACH | Refills: 1 | Status: SHIPPED
Start: 2021-09-27 | End: 2022-02-28 | Stop reason: SDUPTHER

## 2021-09-27 RX ORDER — LANCETS 30 GAUGE
EACH MISCELLANEOUS
Qty: 200 EACH | Refills: 1 | Status: SHIPPED
Start: 2021-09-27 | End: 2022-02-28 | Stop reason: SDUPTHER

## 2021-09-27 ASSESSMENT — ENCOUNTER SYMPTOMS
SHORTNESS OF BREATH: 0
COUGH: 0
DIARRHEA: 0
VOMITING: 0
ABDOMINAL DISTENTION: 0
NAUSEA: 0
WHEEZING: 0
PHOTOPHOBIA: 0
BACK PAIN: 1
RHINORRHEA: 0
SORE THROAT: 0
CONSTIPATION: 0
ANAL BLEEDING: 0
CHEST TIGHTNESS: 0
SINUS PRESSURE: 0
ABDOMINAL PAIN: 0
BLOOD IN STOOL: 0
FACIAL SWELLING: 0

## 2021-09-27 NOTE — PROGRESS NOTES
Patient verbalized understanding of office instructions. He will call with questions or concerns. Pt was given discharge instructions, and scripts  for lab work to be done. All questions were fully answered.   Printed AVS given

## 2021-09-27 NOTE — PATIENT INSTRUCTIONS
1. Please use Voltaren gel on your back, if the pain does not resolve, discuss with your PCP on the next visit  2. Please continue to take all of your medications as you have been taking.

## 2021-09-27 NOTE — PROGRESS NOTES
[facility]  Internal Medicine Residency Program  ACC Note      SUBJECTIVE:  CC: had concerns including Follow-Up from Hospital (for Heart failure  ), Leg Swelling (skin  redden ), Back Pain (low , rates #10), and Knee Pain (right states\"  aching alot\"). HPI:  Jamilah Sheppard is a 74 y.o.male with PMH of HFpEF (60-65%), obesity related restrictive lung disease, Persistent atrial fibrillation, Essential HTN, Acquired hypothyroidism, Insulin dependent type 2 DM and NIKO  presenting to [facility] for hospital follow up    Patient was discharged from the hospital on 09/24/21. Today he denies any shortness of breath, feet swelling has been going down. He mentions urinating a lot more now that Bumex has been started. He denies any cough, fevers, chills, chest pain, pleurisy, dyspnea on exertion. He requires 4L of home O2 especially when sleeping, but is saturating well when seen in the clinic without any supplemental oxygen.  -has not completed his BMP, hence can not make a decision further about starting Lisinopril. BP remains controlled with only amlodipine. -A1C today, 6.7 improved from 7.4 (06/14/21)  -complains of sudden onset back pain, lower back, paraspinal, since waking up in the morning, not associated with any neurovascular deficits    Review of Systems   Constitutional: Positive for activity change. Negative for appetite change, chills, diaphoresis, fatigue and fever. HENT: Negative for facial swelling, postnasal drip, rhinorrhea, sinus pressure, sneezing and sore throat. Eyes: Negative for photophobia and visual disturbance. Respiratory: Negative for cough, chest tightness, shortness of breath and wheezing. Cardiovascular: Positive for leg swelling. Negative for chest pain and palpitations. Pedal edema has gone down significantly   Gastrointestinal: Negative for abdominal distention, abdominal pain, anal bleeding, blood in stool, constipation, diarrhea, nausea and vomiting. Central obesity  Denies blood in stools currently, but mentions has had \"dark black stools in the past\"     Endocrine: Positive for polyuria. Negative for cold intolerance and heat intolerance. After starting Bumex   Genitourinary: Negative for difficulty urinating, dysuria, frequency and urgency. Musculoskeletal: Positive for back pain and gait problem. Neurological: Negative for light-headedness and headaches. Outpatient Medications Marked as Taking for the 9/27/21 encounter (Office Visit) with Roger Henderson MD   Medication Sig Dispense Refill    blood glucose test strips (ONETOUCH VERIO) strip TEST IN THE MORNING AND IN THE EVENING 100 each 1    Lancets MISC Give Delica lancets. Tests twice a day A.M. And P.M 200 each 1    Insulin Pen Needle (PEN NEEDLES) 32G X 6 MM MISC 2 times a day, in the morning and before bed 100 each 1    atorvastatin (LIPITOR) 40 MG tablet TAKE ONE TABLET BY MOUTH DAILY 90 tablet 1    apixaban (ELIQUIS) 5 MG TABS tablet Take 1 tablet by mouth 2 times daily 60 tablet 0    diclofenac sodium (VOLTAREN) 1 % GEL Apply 2 g topically 4 times daily 50 g 1    amLODIPine (NORVASC) 5 MG tablet Take 1 tablet by mouth daily 30 tablet 3    levothyroxine (SYNTHROID) 150 MCG tablet Take 1 tablet by mouth daily 30 tablet 3    bumetanide (BUMEX) 2 MG tablet Take 1 tablet by mouth daily 30 tablet 3    Nebulizers (NEBULIZER COMPRESSOR) MISC Please provide patient with nebulizer and tubing needed to use equipment.  1 each 0    metoprolol succinate (TOPROL XL) 25 MG extended release tablet TAKE ONE TABLET BY MOUTH DAILY 30 tablet 11    insulin glargine (BASAGLAR KWIKPEN) 100 UNIT/ML injection pen Inject 35 Units into the skin every morning PAP medication 5 pen 0    metFORMIN (GLUCOPHAGE) 1000 MG tablet TAKE ONE TABLET BY MOUTH TWICE A DAY WITH MEALS 180 tablet 2    Cholecalciferol (VITAMIN D3) 1000 units TABS TAKE TWO TABLETS BY MOUTH EVERY DAY 30 tablet 0    aspirin (ASPIR-LOW) 81 MG EC tablet TAKE ONE TABLET BY MOUTH EVERY DAY 30 tablet 2    acetaminophen (TYLENOL) 500 MG tablet Take 1,000 mg by mouth daily as needed for Pain. OBJECTIVE:    VS:   /64 (Site: Right Lower Arm, Position: Sitting)   Pulse 88   Temp 97.5 °F (36.4 °C) (Temporal)   Resp 20   Ht 5' 7\" (1.702 m)   Wt 228 lb 1.6 oz (103.5 kg)   SpO2 96% Comment: on room air  BMI 35.73 kg/m²     EXAM:  Physical Exam    ASSESSMENT/PLAN:  I have reviewed all pertinent PMH, PSH, FH, SH, medications and allergies and updated history as appropriate. 1. Musculoskeletal pain, paraspinal  -Voltaren gel, local application; apply heating pads    2. Chronic HFpEF (EF: 60-65%, 10/2020)  -Resolving, on Bumex 2mg daily to be continued  -Heart failure clinic follow up  -requires ischemic evaluation    3. Chronic hypoxic respiratory failure 2/2 obesity related restrictive lung disease, on 4L home O2 only at night  -normal spo2 at resting currently  -Pulmonology follow up 01/2022 with Dr. Meredith Bledsoe    4. Left sided pleural effusion, likely 2/2 HFpEF exacerbation  -denies any pleuritic type chest pain, lungs sounds are equal bilaterally    5. Persistent atrial fibrillation, on Toprol and eliquis  -[id number]= 3  -asymptomatic, Toprol, Eliquis to be continued  -checking occult blood in stool    6. Sinus node dysfunction s/p dual chamber pacemaker, DOI 10/3/2017  -last device check was normal 05/27/21, requires remote transmission every 3 months, follow up every year with EP    7. Essential HTN, well controlled on Amlodipine   8. Acquired hypothyroidism on Synthroid, re-checking TSH, and free T4  9. Insulin dependent type 2 DM, A1C improving 7.4-->6.7 today  -continue Basaglar 35U nightly + Metformin 1000mg    10. NIKO, on CPAP, non-compliant  10. Obesity, BMI: 45     Giuliana Cabrera was seen today for follow-up from hospital, leg swelling, back pain and knee pain.     Diagnoses and all orders for this visit:    Chronic heart failure with preserved ejection fraction ([facility] [address]  -     [facility]    Type 2 diabetes mellitus with diabetic autonomic neuropathy, with long-term current use of insulin (HCC)  -     blood glucose test strips (ONETOUCH VERIO) strip; TEST IN THE MORNING AND IN THE EVENING  -     Lancets MISC; Give Delica lancets. Tests twice a day A.M. And P.M  -     atorvastatin (LIPITOR) 40 MG tablet; TAKE ONE TABLET BY MOUTH DAILY  -     POCT glycosylated hemoglobin (Hb A1C)    Type 2 diabetes mellitus with hyperglycemia, with long-term current use of insulin (HCC)  -     Insulin Pen Needle (PEN NEEDLES) 32G X 6 MM MISC; 2 times a day, in the morning and before bed    Anticoagulated  -     BLOOD OCCULT STOOL SCREEN #1; Future    Persistent atrial fibrillation (HCC)  -     BLOOD OCCULT STOOL SCREEN #1; Future  -     apixaban (ELIQUIS) 5 MG TABS tablet; Take 1 tablet by mouth 2 times daily    Acquired hypothyroidism  -     TSH; Future  -     T4, FREE; Future    Spasm of back muscles  -     diclofenac sodium (VOLTAREN) 1 % GEL;  Apply 2 g topically 4 times daily      RTC: in 1 month with PCP    I have reviewed my findings and recommendations with Bhakti Rocha and Dr. Jadyn Hawthorne MD PGY-2   9/27/2021 6:59 PM

## 2021-09-27 NOTE — CARE COORDINATION
Care Transitions Outreach Attempt    Call within 2 business days of discharge: Yes   Attempted to reach patient for transitions of care follow up. Unable to reach patient. Patient: Brittanie Oates Patient : 1955 MRN: [id number]    Last Discharge [facility]       Complaint Diagnosis Description Type Department Provider    21 Shortness of Breath Congestive heart failure, unspecified HF chronicity, unspecified heart failure type ([facility] [address] . .. ED to Hosp-Admission (Discharged) (ADMITTED) Suraj Jay MD; Bimal Moy...        24 Hour Transition of Care Call:    1st Attempt to contact patient for Initial 24 Hour Transition from hospital to home Call:   Patient not reached. Left HIPAA Compliant message on Voice Mail to call CTN (number given) with any questions and an update on patient's condition since discharge. Will continue to follow. Mckenna Baca LPN    [phone]  [facility] [place] [address] Coordinator        Was this an external facility discharge?  No Discharge Facility: [facility]    Noted following upcoming appointments from discharge chart review:   [facility] follow up appointment(s):   Future Appointments   Date Time Provider Stacie Mtz   2021  1:30 PM Asad Cook MD [facility]   2022  2:00 PM [facility]     Non-[facility] follow up appointment(s):

## 2021-09-28 ENCOUNTER — CARE COORDINATION (OUTPATIENT)
Dept: CASE MANAGEMENT | Age: 66
End: 2021-09-28

## 2021-09-28 DIAGNOSIS — I50.31 ACUTE HEART FAILURE WITH PRESERVED EJECTION FRACTION (HFPEF) (HCC): Primary | ICD-10-CM

## 2021-09-28 NOTE — CARE COORDINATION
[facility] Transitions Initial Follow Up Call    Call within 2 business days of discharge: Yes    Patient: Rogelio Guzman Patient : 1955   MRN: [id number]  Reason for Admission: CHF  Discharge Date: 21 RARS: Readmission Risk Score: 15      Last Discharge [address]       Complaint Diagnosis Description Type Department Provider    21 Shortness of Breath Congestive heart failure, unspecified HF chronicity, unspecified heart failure type ([address] . .. ED to Hosp-Admission (Discharged) (ADMITTED) Dulce Juarez MD; Sara Harris. .. Transitions of Care Initial Call    Patient states he is having back pain from being in bed to long. He saw PCP yesterday and they advised taking aleve and being up and about. Suggested alternating Ice and heat. Patient expresses understanding. Call PCP for worsening symptoms. Patient expresses understanding. Patient denies weight gain (has no scale) , SOB or wheezing, coughing, extremity edema, abdominal edema, or chest pain. Uses oxygen 2.5/L at night only. Reviewed medications with patient. Patient confirms they have all medications and medications are being taken as directed. There are no questions concerning medications at this time. No 1111F patient saw PCP 2021. Blood sugar not taken today. Patient has no symptoms of hyperglycemia or hypoglycemia. Taking all Diabetic medications as directed. Patient has fair appetite and is drinking adequate fluids. Normal bladder and bowel elimination patterns. Patient has no needs or concerns for writer at this time. Instructed patient that this is the Final Transition Call and to call their Specialist/PCP for any problems or issues that may occur. Expresses understanding. [facility]    [place], [place]    [phone]  Doree Gottron / Matt [facility]ff Coordinator      Was this an external facility discharge?  No Discharge Facility:     Challenges to be reviewed by the provider Additional needs identified to be addressed with provider No  none             Method of communication with provider : none      Advance Care Planning:   Does patient have an Advance Directive:  reviewed and current. Was this a readmission? No  Patient stated reason for admission: CHF  Patients top risk factors for readmission: functional physical ability, lack of knowledge about disease and medication management    Care Transition Nurse (CTN) contacted the patient by telephone to perform post hospital discharge assessment. Verified name and  with patient as identifiers. Provided introduction to self, and explanation of the CTN role. CTN reviewed discharge instructions, medical action plan and red flags with patient who verbalized understanding. Patient given an opportunity to ask questions and does not have any further questions or concerns at this time. Were discharge instructions available to patient? Yes. Reviewed appropriate site of care based on symptoms and resources available to patient including: PCP and When to call 911. The patient agrees to contact the PCP office for questions related to their healthcare. Medication reconciliation was performed with patient, who verbalizes understanding of administration of home medications. Advised obtaining a 90-day supply of all daily and as-needed medications. Covid Risk Education     Educated patient about risk for severe COVID-19 due to risk factors according to CDC guidelines. LPN CC reviewed discharge instructions, medical action plan and red flag symptoms with patient who verbalized understanding. Discussed COVID vaccination status No.   Education provided on COVID-19 vaccination as appropriate. Discussed exposure protocols and quarantine with CDC Guidelines.    Patient was given an opportunity to verbalize any questions and concerns and agrees to contact LPN CC or health care provider for questions related to their healthcare. Reviewed and educated patient on any new and changed medications related to discharge diagnosis     Was patient discharged with a pulse oximeter? No     Discussed and confirmed pulse oximeter discharge instructions and when to notify provider or seek emergency care. LPN CC provided contact information. No further follow-up call identified based on severity of symptoms and risk factors.        Non-face-to-face services provided:  Obtained and reviewed discharge summary and/or continuity of care documents    Care Transitions 24 Hour Call    Schedule Follow Up Appointment with PCP: Completed  Do you have any ongoing symptoms?: Yes  Patient-reported symptoms: Pain  Do you have a copy of your discharge instructions?: Yes  Do you have all of your prescriptions and are they filled?: Yes  Have you been contacted by a [facility] Pharmacist?: No  Have you scheduled your follow up appointment?: Yes (Comment: PCP 9/27/2021)  How are you going to get to your appointment?: Other  Were you discharged with any Home Care or Post Acute Services: No  Do you feel like you have everything you need to keep you well at home?: Yes  Care Transitions Interventions  No Identified Needs         Follow Up  Future Appointments   Date Time Provider Stacie Mtz   11/22/2021 10:00 AM Shalom Elam MD [facility]   1/4/2022  2:00 PM Ezra Schreiber DO [address] [place]       Zainab Sutton LPN

## 2021-10-05 ENCOUNTER — TELEPHONE (OUTPATIENT)
Dept: OTHER | Age: 66
End: 2021-10-05

## 2021-10-05 NOTE — TELEPHONE ENCOUNTER
Called pt regarding new referral to [address] [facility] [address] [place] [address] CHF clinic. Clinic explained and pt declines getting established at this time. Clinic contact information provided in case his needs may change.    Fred Morillo RN

## 2021-10-08 ENCOUNTER — HOSPITAL ENCOUNTER (OUTPATIENT)
Age: 66
Discharge: HOME | End: 2021-10-08
Payer: MEDICARE

## 2021-10-08 DIAGNOSIS — I10 ESSENTIAL HYPERTENSION: ICD-10-CM

## 2021-10-08 DIAGNOSIS — E03.9 ACQUIRED HYPOTHYROIDISM: ICD-10-CM

## 2021-10-08 DIAGNOSIS — I48.19 PERSISTENT ATRIAL FIBRILLATION (HCC): ICD-10-CM

## 2021-10-08 LAB
T4 FREE: 2.26 NG/DL (ref 0.93–1.7)
TSH SERPL DL<=0.05 MIU/L-ACNC: 0.79 UIU/ML (ref 0.27–4.2)

## 2021-10-08 PROCEDURE — 36415 COLL VENOUS BLD VENIPUNCTURE: CPT

## 2021-10-08 PROCEDURE — 84439 ASSAY OF FREE THYROXINE: CPT

## 2021-10-08 PROCEDURE — 84443 ASSAY THYROID STIM HORMONE: CPT

## 2021-10-08 RX ORDER — LISINOPRIL 40 MG/1
40 TABLET ORAL DAILY
Qty: 90 TABLET | Refills: 0 | Status: SHIPPED
Start: 2021-10-08 | End: 2021-11-22 | Stop reason: SDUPTHER

## 2021-10-08 RX ORDER — LEVOTHYROXINE SODIUM 112 UG/1
TABLET ORAL
Qty: 90 TABLET | Refills: 0 | Status: SHIPPED
Start: 2021-10-08 | End: 2021-11-22 | Stop reason: SDUPTHER

## 2021-11-19 DIAGNOSIS — E11.43 TYPE 2 DIABETES MELLITUS WITH DIABETIC AUTONOMIC NEUROPATHY, WITH LONG-TERM CURRENT USE OF INSULIN (HCC): ICD-10-CM

## 2021-11-19 DIAGNOSIS — Z79.4 TYPE 2 DIABETES MELLITUS WITH DIABETIC AUTONOMIC NEUROPATHY, WITH LONG-TERM CURRENT USE OF INSULIN (HCC): ICD-10-CM

## 2021-11-22 ENCOUNTER — HOSPITAL ENCOUNTER (OUTPATIENT)
Age: 66
Discharge: HOME | End: 2021-11-22
Payer: MEDICARE

## 2021-11-22 ENCOUNTER — OFFICE VISIT (OUTPATIENT)
Dept: INTERNAL MEDICINE | Age: 66
End: 2021-11-22
Payer: MEDICARE

## 2021-11-22 VITALS
DIASTOLIC BLOOD PRESSURE: 77 MMHG | RESPIRATION RATE: 18 BRPM | TEMPERATURE: 97.6 F | HEIGHT: 67 IN | HEART RATE: 84 BPM | BODY MASS INDEX: 37.06 KG/M2 | WEIGHT: 236.1 LBS | OXYGEN SATURATION: 93 % | SYSTOLIC BLOOD PRESSURE: 172 MMHG

## 2021-11-22 DIAGNOSIS — I50.32 CHRONIC HEART FAILURE WITH PRESERVED EJECTION FRACTION (HCC): ICD-10-CM

## 2021-11-22 DIAGNOSIS — E66.9 RESTRICTIVE LUNG DISEASE SECONDARY TO OBESITY: ICD-10-CM

## 2021-11-22 DIAGNOSIS — E11.43 TYPE 2 DIABETES MELLITUS WITH DIABETIC AUTONOMIC NEUROPATHY, WITH LONG-TERM CURRENT USE OF INSULIN (HCC): ICD-10-CM

## 2021-11-22 DIAGNOSIS — I10 ESSENTIAL HYPERTENSION: ICD-10-CM

## 2021-11-22 DIAGNOSIS — E03.9 ACQUIRED HYPOTHYROIDISM: ICD-10-CM

## 2021-11-22 DIAGNOSIS — J98.4 RESTRICTIVE LUNG DISEASE SECONDARY TO OBESITY: ICD-10-CM

## 2021-11-22 DIAGNOSIS — Z79.4 TYPE 2 DIABETES MELLITUS WITH DIABETIC AUTONOMIC NEUROPATHY, WITH LONG-TERM CURRENT USE OF INSULIN (HCC): ICD-10-CM

## 2021-11-22 DIAGNOSIS — I50.32 CHRONIC HEART FAILURE WITH PRESERVED EJECTION FRACTION (HCC): Primary | ICD-10-CM

## 2021-11-22 DIAGNOSIS — I48.19 PERSISTENT ATRIAL FIBRILLATION (HCC): ICD-10-CM

## 2021-11-22 LAB
ANION GAP SERPL CALCULATED.3IONS-SCNC: 17 MMOL/L (ref 7–16)
BUN BLDV-MCNC: 30 MG/DL (ref 6–23)
CALCIUM SERPL-MCNC: 8.7 MG/DL (ref 8.6–10.2)
CHLORIDE BLD-SCNC: 98 MMOL/L (ref 98–107)
CO2: 23 MMOL/L (ref 22–29)
CREAT SERPL-MCNC: 1.1 MG/DL (ref 0.7–1.2)
GFR AFRICAN AMERICAN: >60
GFR NON-AFRICAN AMERICAN: >60 ML/MIN/1.73
GLUCOSE BLD-MCNC: 269 MG/DL (ref 74–99)
POTASSIUM SERPL-SCNC: 4.2 MMOL/L (ref 3.5–5)
SODIUM BLD-SCNC: 138 MMOL/L (ref 132–146)
T4 FREE: 2.1 NG/DL (ref 0.93–1.7)
TSH SERPL DL<=0.05 MIU/L-ACNC: 0.27 UIU/ML (ref 0.27–4.2)

## 2021-11-22 PROCEDURE — 80048 BASIC METABOLIC PNL TOTAL CA: CPT

## 2021-11-22 PROCEDURE — 84443 ASSAY THYROID STIM HORMONE: CPT

## 2021-11-22 PROCEDURE — G8484 FLU IMMUNIZE NO ADMIN: HCPCS | Performed by: STUDENT IN AN ORGANIZED HEALTH CARE EDUCATION/TRAINING PROGRAM

## 2021-11-22 PROCEDURE — 3044F HG A1C LEVEL LT 7.0%: CPT | Performed by: STUDENT IN AN ORGANIZED HEALTH CARE EDUCATION/TRAINING PROGRAM

## 2021-11-22 PROCEDURE — G8417 CALC BMI ABV UP PARAM F/U: HCPCS | Performed by: STUDENT IN AN ORGANIZED HEALTH CARE EDUCATION/TRAINING PROGRAM

## 2021-11-22 PROCEDURE — 99212 OFFICE O/P EST SF 10 MIN: CPT | Performed by: STUDENT IN AN ORGANIZED HEALTH CARE EDUCATION/TRAINING PROGRAM

## 2021-11-22 PROCEDURE — 3017F COLORECTAL CA SCREEN DOC REV: CPT | Performed by: STUDENT IN AN ORGANIZED HEALTH CARE EDUCATION/TRAINING PROGRAM

## 2021-11-22 PROCEDURE — 1036F TOBACCO NON-USER: CPT | Performed by: STUDENT IN AN ORGANIZED HEALTH CARE EDUCATION/TRAINING PROGRAM

## 2021-11-22 PROCEDURE — G8427 DOCREV CUR MEDS BY ELIG CLIN: HCPCS | Performed by: STUDENT IN AN ORGANIZED HEALTH CARE EDUCATION/TRAINING PROGRAM

## 2021-11-22 PROCEDURE — 84439 ASSAY OF FREE THYROXINE: CPT

## 2021-11-22 PROCEDURE — 4040F PNEUMOC VAC/ADMIN/RCVD: CPT | Performed by: STUDENT IN AN ORGANIZED HEALTH CARE EDUCATION/TRAINING PROGRAM

## 2021-11-22 PROCEDURE — 1123F ACP DISCUSS/DSCN MKR DOCD: CPT | Performed by: STUDENT IN AN ORGANIZED HEALTH CARE EDUCATION/TRAINING PROGRAM

## 2021-11-22 PROCEDURE — 36415 COLL VENOUS BLD VENIPUNCTURE: CPT

## 2021-11-22 PROCEDURE — 99213 OFFICE O/P EST LOW 20 MIN: CPT | Performed by: STUDENT IN AN ORGANIZED HEALTH CARE EDUCATION/TRAINING PROGRAM

## 2021-11-22 PROCEDURE — 2022F DILAT RTA XM EVC RTNOPTHY: CPT | Performed by: STUDENT IN AN ORGANIZED HEALTH CARE EDUCATION/TRAINING PROGRAM

## 2021-11-22 RX ORDER — LEVOTHYROXINE SODIUM 112 UG/1
TABLET ORAL
Qty: 90 TABLET | Refills: 0 | Status: SHIPPED
Start: 2021-11-22 | End: 2021-12-27

## 2021-11-22 RX ORDER — ASPIRIN 81 MG/1
TABLET ORAL
Qty: 30 TABLET | Refills: 2 | Status: SHIPPED
Start: 2021-11-22 | End: 2022-02-28 | Stop reason: SDUPTHER

## 2021-11-22 RX ORDER — APIXABAN 5 MG/1
TABLET, FILM COATED ORAL
COMMUNITY
Start: 2021-11-18 | End: 2021-11-22 | Stop reason: SDUPTHER

## 2021-11-22 RX ORDER — AMLODIPINE BESYLATE 5 MG/1
5 TABLET ORAL DAILY
Qty: 30 TABLET | Refills: 3 | Status: SHIPPED
Start: 2021-11-22 | End: 2022-02-28 | Stop reason: SDUPTHER

## 2021-11-22 RX ORDER — BUMETANIDE 2 MG/1
2 TABLET ORAL DAILY
Qty: 30 TABLET | Refills: 3 | Status: SHIPPED
Start: 2021-11-22 | End: 2022-02-28 | Stop reason: SDUPTHER

## 2021-11-22 RX ORDER — APIXABAN 5 MG/1
5 TABLET, FILM COATED ORAL 2 TIMES DAILY
Qty: 180 TABLET | Refills: 1 | Status: SHIPPED
Start: 2021-11-22 | End: 2022-07-18 | Stop reason: SDUPTHER

## 2021-11-22 RX ORDER — INSULIN GLARGINE 100 [IU]/ML
35 INJECTION, SOLUTION SUBCUTANEOUS EVERY MORNING
Qty: 5 PEN | Refills: 0 | Status: SHIPPED
Start: 2021-11-22 | End: 2021-11-24

## 2021-11-22 RX ORDER — ATORVASTATIN CALCIUM 40 MG/1
TABLET, FILM COATED ORAL
Qty: 90 TABLET | Refills: 1 | Status: SHIPPED
Start: 2021-11-22 | End: 2022-07-18 | Stop reason: SDUPTHER

## 2021-11-22 RX ORDER — LISINOPRIL 40 MG/1
40 TABLET ORAL DAILY
Qty: 90 TABLET | Refills: 0 | Status: SHIPPED
Start: 2021-11-22 | End: 2022-02-28 | Stop reason: SDUPTHER

## 2021-11-22 RX ORDER — METOPROLOL SUCCINATE 25 MG/1
25 TABLET, EXTENDED RELEASE ORAL DAILY
Qty: 90 TABLET | Refills: 1 | Status: SHIPPED
Start: 2021-11-22 | End: 2022-07-18 | Stop reason: SDUPTHER

## 2021-11-22 ASSESSMENT — ENCOUNTER SYMPTOMS
ABDOMINAL PAIN: 0
CONSTIPATION: 0
VOMITING: 0
WHEEZING: 0
NAUSEA: 0
COUGH: 0
ABDOMINAL DISTENTION: 1
SORE THROAT: 0
DIARRHEA: 0
SHORTNESS OF BREATH: 1

## 2021-11-22 NOTE — PROGRESS NOTES
All instructions and lab scripts given to patient by    Follow-up appointment scheduled and printed avs given to patient

## 2021-11-22 NOTE — PATIENT INSTRUCTIONS
Dear Jin Rocha,        Thank you for coming to your appointment today. I hope we have addressed all of your needs. Please make sure to do the following:  - Continue your medications as listed. - Get labs drawn today. - Referrals have been made to CHF clinic:  If you do not hear from the office in 1 week, please call the number listed. - We will see each other again in 3 months    Call for a sooner appointment if you develop new or worsening symptoms    Have a great day!         Sincerely,  Lori Lamas M.D  11/22/2021  11:21 AM

## 2021-11-22 NOTE — PROGRESS NOTES
[facility]  Internal Medicine Clinic    Attending Physician Statement:  Pierre Pierre M.D., F.A.C.P. I have discussed the case, including pertinent history and exam findings with the resident. I agree with the assessment, plan and orders as documented by the resident. Patient is seen for fu visit today. Last office notes reviewed, relative labs and imaging. Health maintenance issues of vaccinations, depression screening, tobacco cessation etc... covered  Inc bmi, obesity hypoventilation  decompensation HF 9/17/21 - 9/24/21. Discharge summary reviewed. Doing well after discharge with bumex. BP stable on amlodipine. chf hospitaliztion 9/24  improvoed ongoign edema,    on bumex now- chf clinic telephone per notes:  \"cancelled CHF Clinic appt. Does NOT want to re schedule at present\"  BP (!) 172/77   Pulse 84   Temp 97.6 °F (36.4 °C)   Resp 18   Ht 5' 7\" (1.702 m)   Wt 236 lb 1.6 oz (107.1 kg)   SpO2 93%   BMI 36.98 kg/m²   HTN uncontrolled, needs BMP done, used to be on ACE  However hadn't been taking ace  Cr 1.1 from hospital - started bumex, needs reassessed  Also advised to cut back on water inake    Persistent afib  Dm2, inc bmi  Adjusted synthroid, bumped dose down-one month ago - bc free T4 inc/tsh N- reassess  Remainder of medical problems as per resident note.

## 2021-11-22 NOTE — PROGRESS NOTES
[facility]  Internal Medicine Residency Program  [facility] Note      SUBJECTIVE:  CC: had concerns including Hypertension and Diabetes. HPI:  George Mishra is a 74 y.o.male with PMH of HFpEF (60-65%), HTN, IDDM2, HLD, Hypothyroidism, Persistent AF, Sinus Node Dysfunction with internal pacemaker in place, Obesity, NIKO, Obesity Hypoventilation Syndrome, Vitamin D deficiency, Chronic back pain presenting to [facility] for HTN, DM, CHF    HFpEF  - He was hospitalized on 9/17/2021 with decompensated HFpEF, received diuresis and was subsequently discharged  - Missed HF clinic follow up  - Taking Bumex 2 mg daily  - Taking Toprol-XL 25 mg daily  - Has lisinopril prescribed but has not been taking since he has not gotten his BMP, he will get it collected today and restart lisinopril, if BMP results show any issues we will stop it      HTN  - On Amlodipine 5 mg, Lisinopril 40 mg and Toprol-XL 25 mg  - BP remain elevated in the office, unclear if compliant    Obesity Hypoventilation Syndrome  - On nightly oxygen, stable on RA at this time  - Follows up with Dr. Shalom Alvarado, next visit 01/2022    Persistent AF  - [id number] = 4  - On Toprol-XL and Eliquis    Sinus Node Dysfunction s/p dual chamber pacemaker since 10/2017  - Follow up with EP  - Denies any symptoms    Hypothyroidism  - On Synthroid    IDDM2  - Last A1c 6.7 on 9/2021  - Takes Basaglar 35U nightly and Metformin 1000 mg twice daily    HCM  - Due for annual wellness visit, covid booster      Review of Systems   Constitutional: Negative for activity change, appetite change, chills, fatigue, fever and unexpected weight change. HENT: Negative for postnasal drip and sore throat. Respiratory: Positive for shortness of breath. Negative for cough and wheezing. Cardiovascular: Positive for leg swelling. Negative for chest pain and palpitations. Gastrointestinal: Positive for abdominal distention.  Negative for abdominal pain, constipation, diarrhea, nausea and vomiting. Endocrine: Positive for polydipsia and polyuria. Genitourinary: Negative for difficulty urinating and dysuria. Neurological: Negative for dizziness, syncope, light-headedness and headaches. Psychiatric/Behavioral: Negative for behavioral problems. Outpatient Medications Marked as Taking for the 11/22/21 encounter (Office Visit) with Rylan Hess MD   Medication Sig Dispense Refill    aspirin (ASPIR-LOW) 81 MG EC tablet TAKE ONE TABLET BY MOUTH EVERY DAY 30 tablet 2    mometasone-formoterol (DULERA) 200-5 MCG/ACT inhaler Inhale 2 puffs into the lungs 2 times daily Rinse mouth after using. PAP medication. 1 each 2    ELIQUIS 5 MG TABS tablet Take 1 tablet by mouth 2 times daily 180 tablet 1    insulin glargine (BASAGLAR KWIKPEN) 100 UNIT/ML injection pen Inject 35 Units into the skin every morning PAP medication 5 pen 0    metFORMIN (GLUCOPHAGE) 1000 MG tablet TAKE ONE TABLET BY MOUTH TWICE A DAY WITH MEALS 180 tablet 2    atorvastatin (LIPITOR) 40 MG tablet TAKE ONE TABLET BY MOUTH DAILY 90 tablet 1    metoprolol succinate (TOPROL XL) 25 MG extended release tablet Take 1 tablet by mouth daily 90 tablet 1    amLODIPine (NORVASC) 5 MG tablet Take 1 tablet by mouth daily 30 tablet 3    bumetanide (BUMEX) 2 MG tablet Take 1 tablet by mouth daily 30 tablet 3    levothyroxine (SYNTHROID) 112 MCG tablet TAKE 1 TABLET BY MOUTH  DAILY 90 tablet 0    lisinopril (PRINIVIL;ZESTRIL) 40 MG tablet Take 1 tablet by mouth daily 90 tablet 0    Nebulizers (NEBULIZER COMPRESSOR) MISC Please provide patient with nebulizer and tubing needed to use equipment. 1 each 0    Cholecalciferol (VITAMIN D3) 1000 units TABS TAKE TWO TABLETS BY MOUTH EVERY DAY 30 tablet 0    acetaminophen (TYLENOL) 500 MG tablet Take 1,000 mg by mouth daily as needed for Pain.          OBJECTIVE:    VS:   BP (!) 172/77   Pulse 84   Temp 97.6 °F (36.4 °C)   Resp 18   Ht 5' 7\" (1.702 m)   Wt 236 lb 1.6 oz 81 MG EC tablet; TAKE ONE TABLET BY MOUTH EVERY DAY  -     metoprolol succinate (TOPROL XL) 25 MG extended release tablet; Take 1 tablet by mouth daily  -     amLODIPine (NORVASC) 5 MG tablet; Take 1 tablet by mouth daily  -     BASIC METABOLIC PANEL; Future  -     lisinopril (PRINIVIL;ZESTRIL) 40 MG tablet; Take 1 tablet by mouth daily  - If BMP results show worsening Cr, will stop Lisinopril    Type 2 diabetes mellitus with diabetic autonomic neuropathy, with long-term current use of insulin (HCC)  -     insulin glargine (BASAGLAR KWIKPEN) 100 UNIT/ML injection pen; Inject 35 Units into the skin every morning PAP medication  -     metFORMIN (GLUCOPHAGE) 1000 MG tablet; TAKE ONE TABLET BY MOUTH TWICE A DAY WITH MEALS  -     atorvastatin (LIPITOR) 40 MG tablet; TAKE ONE TABLET BY MOUTH DAILY  -     BASIC METABOLIC PANEL; Future    Acquired hypothyroidism  -     levothyroxine (SYNTHROID) 112 MCG tablet; TAKE 1 TABLET BY MOUTH  DAILY  -     TSH; Future  -     T4, FREE; Future    Persistent atrial fibrillation (HCC)  -     ELIQUIS 5 MG TABS tablet; Take 1 tablet by mouth 2 times daily    Restrictive lung disease secondary to obesity  -     mometasone-formoterol (DULERA) 200-5 MCG/ACT inhaler; Inhale 2 puffs into the lungs 2 times daily Rinse mouth after using. PAP medication.           RTC: 3 months      I have reviewed my findings and recommendations with Kostas Rocha and Dr Leticia English MD PGY-1  11/22/2021 11:34 AM

## 2021-11-24 RX ORDER — INSULIN GLARGINE 100 [IU]/ML
35 INJECTION, SOLUTION SUBCUTANEOUS EVERY MORNING
Qty: 5 PEN | Refills: 5 | Status: SHIPPED
Start: 2021-11-24 | End: 2022-03-01 | Stop reason: ALTCHOICE

## 2021-11-30 ENCOUNTER — TELEPHONE (OUTPATIENT)
Dept: PHARMACY | Facility: CLINIC | Age: 66
End: 2021-11-30

## 2021-11-30 NOTE — TELEPHONE ENCOUNTER
[facility] CLINICAL PHARMACY REVIEW: ADHERENCE REVIEW  Identified care gap per Tanion; fills at [facility]: ACE/ARB, Diabetes and Statin adherence    Last Visit: 11/22/21    Patient identified as LIS = 4, therefore member has a 15% insurance - in cases where the plan's co-pays are less, the lesser applies    Patient also appears to be prescribed: Insulin     ASSESSMENT  ACE/ARB ADHERENCE    Per Insurance Records through 11/21/21 100%; Passed in 2021):   LISINOPRIL TAB 40MG last filled on 10/8/21 for 90 day supply. Next refill due: 1/6/22      BP Readings from Last 3 Encounters:   11/22/21 (!) 172/77   09/27/21 115/64   09/24/21 138/65     Estimated Creatinine Clearance: 75 mL/min (based on SCr of 1.1 mg/dL). DIABETES ADHERENCE    Per Insurance Records through 11/21/21   METFORMIN TAB 1000MG last filled on 8/12/21 for 90 day supply. Next refill due: 11/10/21    Per  Pharmacy:    last picked up on 11/23/21 for 90 day supply. 1 refills remaining. Billed through [employer] Value Date    LABA1C 6.7 09/27/2021    LABA1C 7.4 06/14/2021    LABA1C 7.1 02/23/2021     NOTE A1c <9%    STATIN ADHERENCE    Per Insurance Records through 11/21/21 93%; Passed in 2021):   ATORVASTATIN TAB 40MG last filled on 11/11/21 for 90 day supply. Next refill due: 2/9/21      Lab Results   Component Value Date    CHOL 120 06/14/2021    TRIG 165 (H) 06/14/2021    HDL 24 06/14/2021    LDLCALC 63 06/14/2021     ALT   Date Value Ref Range Status   09/18/2021 20 0 - 40 U/L Final     AST   Date Value Ref Range Status   09/18/2021 18 0 - 39 U/L Final     The ASCVD Risk score (Yony Cortes., et al., 2013) failed to calculate for the following reasons:     The valid total cholesterol range is 130 to 320 mg/dL     PLAN  The following are interventions that have been identified:   Verified  and filled timely    Future Appointments   Date Time Provider Stacie Mtz   1/4/2022  2:00 PM Bao Lomax DO [facility] Pul[facility]   2/28/2022 10:00 AM Rajinder Pulliam MD ACC IM [facility]       [facility].    [address] [facility] free: [phone]

## 2021-11-30 NOTE — TELEPHONE ENCOUNTER
For Pharmacy [id number] in place:  No   Recommendation Provided To: Pharmacy: 3   Intervention Detail: Adherence Monitorin   Gap Closed?: Yes    Intervention Accepted By: Pharmacy: 1   Time Spent (min): 15

## 2021-12-25 DIAGNOSIS — E03.9 ACQUIRED HYPOTHYROIDISM: ICD-10-CM

## 2021-12-27 RX ORDER — LEVOTHYROXINE SODIUM 112 UG/1
TABLET ORAL
Qty: 90 TABLET | Refills: 0 | Status: SHIPPED
Start: 2021-12-27 | End: 2022-02-28

## 2022-01-04 ENCOUNTER — OFFICE VISIT (OUTPATIENT)
Dept: PULMONOLOGY | Age: 67
End: 2022-01-04
Payer: MEDICARE

## 2022-01-04 VITALS
HEIGHT: 66 IN | SYSTOLIC BLOOD PRESSURE: 101 MMHG | DIASTOLIC BLOOD PRESSURE: 61 MMHG | TEMPERATURE: 97.1 F | OXYGEN SATURATION: 95 % | BODY MASS INDEX: 36.16 KG/M2 | HEART RATE: 86 BPM | WEIGHT: 225 LBS | RESPIRATION RATE: 18 BRPM

## 2022-01-04 DIAGNOSIS — R91.1 LUNG NODULE: ICD-10-CM

## 2022-01-04 DIAGNOSIS — E66.01 SEVERE OBESITY (BMI 35.0-35.9 WITH COMORBIDITY) (HCC): ICD-10-CM

## 2022-01-04 DIAGNOSIS — G47.33 OSA (OBSTRUCTIVE SLEEP APNEA): Primary | ICD-10-CM

## 2022-01-04 DIAGNOSIS — G47.33 OBSTRUCTIVE SLEEP APNEA: ICD-10-CM

## 2022-01-04 DIAGNOSIS — J44.9 CHRONIC OBSTRUCTIVE PULMONARY DISEASE, UNSPECIFIED COPD TYPE (HCC): ICD-10-CM

## 2022-01-04 LAB
EXPIRATORY TIME: 7.61 SEC
FEF 25-75% %PRED-PRE: 148 L/SEC
FEF 25-75% PRED: 2.29 L/SEC
FEF 25-75%-PRE: 3.39 L/SEC
FEV1 %PRED-PRE: 68 %
FEV1 PRED: 2.87 L
FEV1/FVC %PRED-PRE: 117 %
FEV1/FVC PRED: 77 %
FEV1/FVC: 90 %
FEV1: 1.97 L
FVC %PRED-PRE: 58 %
FVC PRED: 3.74 L
FVC: 2.18 L
PEF %PRED-PRE: 90 L/SEC
PEF PRED: 7.63 L/SEC
PEF-PRE: 6.9 L/SEC

## 2022-01-04 PROCEDURE — 94010 BREATHING CAPACITY TEST: CPT | Performed by: INTERNAL MEDICINE

## 2022-01-04 PROCEDURE — 99214 OFFICE O/P EST MOD 30 MIN: CPT | Performed by: INTERNAL MEDICINE

## 2022-01-04 PROCEDURE — 99213 OFFICE O/P EST LOW 20 MIN: CPT | Performed by: INTERNAL MEDICINE

## 2022-01-04 RX ORDER — TIOTROPIUM BROMIDE 18 UG/1
18 CAPSULE ORAL; RESPIRATORY (INHALATION) DAILY
Qty: 30 CAPSULE | Refills: 12 | Status: SHIPPED
Start: 2022-01-04 | End: 2022-04-20

## 2022-01-04 ASSESSMENT — PULMONARY FUNCTION TESTS
FVC_PREDICTED: 3.74
FEV1/FVC_PERCENT_PREDICTED_PRE: 117
FVC_PERCENT_PREDICTED_PRE: 58
FEV1: 1.97
FEV1/FVC_PREDICTED: 77
FEV1_PREDICTED: 2.87
FEV1/FVC: 90
FVC: 2.18
FEV1_PERCENT_PREDICTED_PRE: 68

## 2022-01-04 NOTE — PROGRESS NOTES
Patient to follow up with physician in 4 months. Patient was given spiriva samples during office visit under the direction of Dr. Ruth Ann Iraheta.

## 2022-01-04 NOTE — PROGRESS NOTES
[place]  Department of Pulmonary, Critical Care and Sleep Medicine  [facility]  Department of Internal Medicine  Office Note    Dear Joyce García MD    We had the pleasure of seeing Viri Olsen, in the [facility] [address] at [address] [place] [address] regarding his lung nodules, morbid obesity with restrictive lung (TLC 74%) disease and sleep apnea (not tolerated). HISTORY OF PRESENT ILLNESS:    Viri Olsen is a 74 y.o. male with a past medical history of HTN, HLP, DM, hypothyroid, OA, carpal tunnel S/p surgery, restrictive lung problems and NIKO intolerant of CPAP therapy. He also has a history of lung nodules that are stable since 2012 with continued surveillance. We reviewed his CT of the chest which demonstrates stable lung nodules since 2012 and we will continue surveillance yearly. We had a long discussion regarding untreated NIKO and not tolerating his CPAP. In 2017, he underwent back surgery which led to prolonged respiratory failure most likely due to upper airway obstruction, volume overload and pain medication. The surgery occurred at [facility] and his wife requested him to be transferred to [address] [place] [address] where he was able to be extubated and went on to rehabilitation. He is home now but unfortunately the back surgery did not resolve his problems. He has lost 20 pounds from this event and he continues only to use the oxygen only at night. In 2019, he had a pacer placed by EP for sinus rodolfo dysfunction. Mr. Stephanie Grissom underwent a phone visit today for follow up. He has been doing well with minimal shortness of breath with activity. He has no coughing or wheezing. He continues to refuse CPAP therapy but is wearing O2 at 2.5 L nasal/canula at night. He states he is complaint most nights. No chest pain, palpitations or lower extremity edema. No lightheadedness or syncope.   He is riding his spider motorcycle. He is using Dulera 200 BID as his insurance will not cover Breo and his lung function was 52 %. We discussed his oxygen concentrators for camping trips. We discussed his weight and BP issues. He is getting his motorcycles fixed and got a new truck     ALLERGIES:  No Known Allergies    PAST MEDICAL HISTORY:       Diagnosis Date    Atrial fibrillation ([address]     Carpal tunnel syndrome     Hyperlipidemia     Hypertension     Hypothyroidism     Lung nodule     Nocturnal hypoxemia due to obesity 8/8/2013    Obesity     NIKO (obstructive sleep apnea) 8/8/2013    [facility]    Osteoarthritis     Pinched nerve     Lumbar    Restrictive lung disease secondary to obesity 7/25/2016    Sinus node dysfunction (HCC)     Type II or unspecified type diabetes mellitus without mention of complication, not stated as uncontrolled         MEDICATIONS:   Current Outpatient Medications   Medication Sig Dispense Refill    levothyroxine (SYNTHROID) 112 MCG tablet TAKE 1 TABLET BY MOUTH  DAILY 90 tablet 0    insulin glargine (LANTUS SOLOSTAR) 100 UNIT/ML injection pen Inject 35 Units into the skin every morning 5 pen 5    aspirin (ASPIR-LOW) 81 MG EC tablet TAKE ONE TABLET BY MOUTH EVERY DAY 30 tablet 2    mometasone-formoterol (DULERA) 200-5 MCG/ACT inhaler Inhale 2 puffs into the lungs 2 times daily Rinse mouth after using. PAP medication.  1 each 2    ELIQUIS 5 MG TABS tablet Take 1 tablet by mouth 2 times daily 180 tablet 1    metFORMIN (GLUCOPHAGE) 1000 MG tablet TAKE ONE TABLET BY MOUTH TWICE A DAY WITH MEALS 180 tablet 2    atorvastatin (LIPITOR) 40 MG tablet TAKE ONE TABLET BY MOUTH DAILY 90 tablet 1    metoprolol succinate (TOPROL XL) 25 MG extended release tablet Take 1 tablet by mouth daily 90 tablet 1    amLODIPine (NORVASC) 5 MG tablet Take 1 tablet by mouth daily 30 tablet 3    bumetanide (BUMEX) 2 MG tablet Take 1 tablet by mouth daily 30 tablet 3    lisinopril (PRINIVIL;ZESTRIL) 40 MG tablet Take 1 tablet by mouth daily 90 tablet 0    blood glucose test strips (ONETOUCH VERIO) strip TEST IN THE MORNING AND IN THE EVENING 100 each 1    Lancets MISC Give Delica lancets. Tests twice a day A.M. And P.M 200 each 1    Insulin Pen Needle (PEN NEEDLES) 32G X 6 MM MISC 2 times a day, in the morning and before bed 100 each 1    diclofenac sodium (VOLTAREN) 1 % GEL Apply 2 g topically 4 times daily (Patient not taking: Reported on 11/22/2021) 50 g 1    Nebulizers (NEBULIZER COMPRESSOR) MISC Please provide patient with nebulizer and tubing needed to use equipment. 1 each 0    Misc. Devices MISC Please add portability to current O2 order. Resp to evaluate patient for OCD device. 1 each 0    Misc. Devices KIT Dispense 1 blood pressure cuff. 1 kit 0    OXYGEN Inhale 2.5 L into the lungs nightly 1 Device 99    Cholecalciferol (VITAMIN D3) 1000 units TABS TAKE TWO TABLETS BY MOUTH EVERY DAY 30 tablet 0    zoster recombinant adjuvanted vaccine (SHINGRIX) 50 MCG/0.5ML SUSR injection 1 dose now and repeat in 2-6 months 0.5 mL 0    acetaminophen (TYLENOL) 500 MG tablet Take 1,000 mg by mouth daily as needed for Pain. No current facility-administered medications for this visit. SOCIAL AND OCCUPATIONAL HEALTH:  Quit smoking about 5 years ago. Smoker 1 - 3 ppd for 40 years. There is no history of TB or TB exposure. There is no asbestos or silica dust exposure. The patient reports no coal, foundry, quarry or [employer] exposure. There is no recent travel history noted. The patient denies a history of recreational or IV drug use. No hot tub exposure. The patient has no pets. Hobbies include riding his motorcycle. The patient denies excessive alcohol intake. The patient reports no birds or exotic animals. He is , has 4 stepchildren. He is currently on disability but was a  for 30 years. He rides a 'spider\" motorcycle.  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 Jessy Ang nerve in back   [address]  2017    COLONOSCOPY  2007    multiple colon polyps    COLONOSCOPY  12    COLONOSCOPY    EYE SURGERY      lennie cataracts implant    HERNIA REPAIR      umbilical    PACEMAKER PLACEMENT  10/03/2017    Medtronic dual chamber    Dr. Flanagan Deaner Right        FAMILY HISTORY: A review of medical events in the patient's family , including disease which may be hereditary or place the patient at risk were reviewed. Family History   Problem Relation Age of Onset    Cancer Mother     Heart Disease Mother     High Blood Pressure Father     Cancer Brother     High Blood Pressure Brother     Diabetes Brother       Family Status   Relation Name Status    Mother      Father      Sister malka Alive    Brother lisa Alive    Sister sarah Alive    Sister kaela Alive    Sister [employer] Alive    Brother lucrecia     Brother mars Alive    Brother renay Alive        REVIEW OF SYSTEMS: The patients health assessment form was reviewed. Constitutional: General health fair . The patient complains of weight changes. The patient denies any recent fevers or weakness. Head: Patient denies any history of trauma, convulsive disorder or syncope.     Skin:  Patient denies history of changes in pigmentation, eruptions or pruritus. Eyes: Patient denies any history of color blindness, photophobia, diplopia, inflammation, cataracts or glaucoma. Ears: Patient denies history of deafness, tinnitus, pain, discharge or recurrent infections. Nose/Throat: Patient admits to drainage. Patient denies history of soreness of mouth or tongue. Patient denies history of hoarseness, voice changes, sore throats or recurrent tonsillitis. Lymphatic:  Patient denies history of enlargement, inflammation, pain or suppuration. He admits to history of lymphoma. Cardiovascular:  Patient admits to history of palpations, & chest pain. He admits to orthopnea & cold extremities. He admits to edema. Biventricular pacemaker for sinus node dysfunction.    Pulmonary:  Patient denies wheezing, dyspnea, exertional dyspnea, nocturnal dyspnea. He denies cough. He denies hemoptysis or pleurisy. He complains of sleep disorder. He reports symptoms of sleep apnea. Has nocturnal hypoxemia with O2 at 2.5 L N/C. Gastrointestinal: Patient denies changes in appetite. He denies dysphagia, odynophagia or abdominal pain. He denies hematochezia, melena, bowel habit changes or hemorrhoids. Allergy/Immunology: The patient denies itching, hives, or angioedema. The patient denies a problem with seasonal/environmental allergies. Genitourinary:  The patient denies a history of stones or UTI. Vascular: No history of peripheral vascular disease, carotid occlusive disease or aneurysms. Musculoskeletal: The patient reports a history of arthritis & joint pains. He complains of loss of strength. He severe bilateral upper shoulder discomfort   and previous laminectomy with ongoing persistent hip pain and inability to exercise. OA noted. Complains of right thumb discomfort. Breasts: He denies history of masses, lumps, pain, or nipple changes. The patient denies a history of breast cancer. Neurological: Patient denies vertigo.   He denies twitching, convulsions, loss of consciousness. No memory problems. Psychological: Patient denies moodiness, depression or anxiety. He denies obsessions, delusions, illusions or hallucinations. Cancer: No history of cancer reported. Endocrine: No history of goiter. No history of thyroid disorders. He reports diabetes. He is taking 70/30 insulin 20 units at bedtime, reports BG of 100-110's in the morning without overnight hypoglycemic symptoms. He is Rx 12 units qAM and 8 units qHS. His last A1c was 6.3 from 10/3/17. Hematopoietic:  He denies history of bleeding disorders or easy bruising. He denies hypercoagulable disorder. Integumentory: No skin lesion, rashes, venous stasis or varicose veins. They denies any report of skin cancer. Rheumatic:  The patient denies polyarthritis or inflammatory joint disease. PHYSICAL EXAMINATION:   Vitals:    01/04/22 1406   BP: 101/61   Pulse: 86   Resp: 18   Temp: 97.1 °F (36.2 °C)   SpO2: 95%   Weight: 225 lb (102.1 kg)   Height: 5' 6\" (1.676 m)     Constitutional: A morbid obese  74 y.o. male who is alert, oriented, cooperative and in no apparent distress. Head was normocephalic and atraumatic. EENT: EOMI ESTRELLA. MMM. No icterus. No conjunctival injections. Septum was midline, mucosa was without erythema, exudates or cobblestoning. No thrush. Neck: Supple without thyromegaly. No elevated JVP. Trachea was midline. No carotid bruits. Respiratory: Symmetrical without dullness to percussion. Faint crackles on right. No wheezes, rhonchi. No intercostal retraction or use of accessory muscles. No egophony. Cardiovascular: Nonpalpable PMI. Regular without murmur, clicks, gallops or rubs. No left or right ventricular heave. Pacer in situ  Pulses:  Carotid, radial and femoral pulses were equal bilaterally. Abdomen: Obese, rounded and soft without organomegaly. No rebound, rigidity. Lymphatic: No lymphadenopathy. Musculoskeletal: Ambulates without assistance.  Flattened lordotic curvature of the spine. No involuntary movements. Extremities: Tremor noted bilateral hands. Trace lower extremity edema. No ulcerations, tenderness. Noted varicosities & venous statis/ erythema. Sensory function sensation is very diminished. Skin:  Warm and dry. Poor skin color, turgor. No bruises or skin rashes. Old surgical scars are noted. Back surgery scar. Neurological/Psychiatric: General behavior is normal. Memory is slightly impair on long term recall. Emotional status is normal.  Cranial nerves II-XII are intact. DATA:   The data collected below information that was obtained, reviewed, analyzed and interpreted today. Spirometry was compared to previous test if available and demonstrates an FVC of 2.21 liters which is 57 % of predicted with an FEV1 of 1.93 liters which is 65 % of predicted. FEV1/FVC ratio is 86 %. Mid expiratory flow rates are 95 % of predicted. Maximum voluntary ventilation is 75 liters per minute or 58 % of predicted. Flow volume loop shows no signs of intrathoracic or extrathoracic process. Impression: Moderate Restrictive Pathology    Static lung volumes (2015)  demonstrate an ERV of 0.16 liters which is 15% predicted, TGV of 2.34 liters which is 71% predicted, RV which is 2.18 liters which is 103% predicted, TLC of 4.72 liters which is 74% predicted. RV/TLC ratio is 140% predicted. DLCO is 95% predicted with a DL/VA of 94% predicted when corrected for alveolar ventilation. ECHOCARDIOGRAM: (2021):  Left ventricle is normal in size . Micro-bubble contrast injected to enhance left ventricular visualization. No regional wall motion abnormalities seen. Normal left ventricular ejection fraction. Ejection fraction is visually estimated at 55-60%. Indeterminate diastolic function. The left atrium is mildly dilated. Mildly dilated right ventricle. CT scan Right ventricle global systolic function is low normal.  The aortic valve appears moderately sclerotic.     CT SCAN CHEST 9/2021: Intervally new small to moderate left and trace right pleural effusions. Mild thickening of secondary interlobular septa with ground-glass attenuation at some levels, compatible with an element of interstitial pulmonary edema. Patchy mild subsegmental atelectasis bilaterally, worse within the basal left lower lobe and inferior lingula. Multiple essentially stable pulmonary nodules bilaterally. Intervally new small volume ascites and flank edema. Mild upper abdominal lymphadenopathy. CT SCAN CHEST (2/2019):  LUNGS: The tiny lung nodules which were previously present are stable. No pleural effusion or pneumothorax is seen. HEART: Unremarkable. AORTA: The aorta appears to be unremarkable. MEDIASTINUM: The mediastinum nonspecific adenopathy noted most pronounced in the sub-subcarinal space. UPPER ABDOMEN: Unremarkable. OTHER:Unremarkable    CT SCAN CHEST (12/2017): Airways appear patent. A few chronic bands of atelectasis or fibrosis in each lower lobe. Solid noncalcified subpleural nodule posterior lateral aspect right upper lobe at the level the yecenia measures about 3.8 mm whereas previously it measured about 3.1 mm. Other smaller peripheral nodular densities in the right upper lobe are stable. Peripheral solid nodule laterally left upper lobe at the level the yecenia measuring about 3.7 mm is unchanged. CT SCAN CHEST (7/2016): We reviewed the films during the inter-disciplinary rounds/conference, which showed mild cardiomegaly with mediastinal lipomatosis and nonspecific lymph nodes in the mediastinum. 2. 4 mm noncalcified nodules in the upper lobe bilaterally which are unchanged since 2012. CT SCAN CHEST (7/2015): IMPRESSION: 1. Indeterminate pulmonary nodules are stable back to August 22,2012. No evidence for new pulmonary nodule, mass, or lymphadenopathy. 2. No acute pleural-parenchymal disease. 3. Borderline heart size. SLEEP STUDY: (2013)  IMPRESSION: 1. Known sleep apnea. 2. Nocturnal hypoxemia. 3. 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 4. No cardiac dysrhythmia. IMPRESSION:    Tato Fields is a 74 y.o. male who is morbidly obese with restrictive lung disease secondary to his obesity along with incidental pulmonary nodules in a previous smoker and lastly, obstructive sleep apnea, intolerant to CPAP therapy on oxygen at 2.5 liters. With this in mind, we would like to proceed with the following;                  PLAN:    He is feeling ok, however he states he was in the hospital and was diuresed close to 30 pounds. I reviewed his echocardiogram was reported as normal right ventricular size was inadequate to test on bubble study. We discussed his weight, BP & exercise. He is riding his [email or web address] motorcycle. As for his restrictive lung disease, his spirometry is improved after significant weight loss and diuretics. He is intolerant of the CPAP mask and is wearing his O2 at night and he is on 2.5 liter nasal canula. ([employer]) We had a long discussion regarding untreated NIKO and nocturnal hypoxemia the risks. He will follow up in 6 months and on an as needed basis. We will continue with the ICS/LABA for some chronic bronchitis symptoms. Have added Spiriva daily. Because of his frequent travel which includes: camping, overnight trips out of state to visit grandchildren - we discussed a portable oxygen concentrator to help maintain compliance for his chronic respiratory failure - [employer] info was given. It was too expensive. As for the lung nodules given the following since 2015 and they remained stable. Vaccines are current. We hope this updates you on our evaluation and clinical thinking. Thank you for intrusting us to participate in Tato Fields care. If you have any questions, please don't hesitate to call us at the [facility].     Sincerely,    Mayda Boone DO, MPH, José Manuel Martin, YUNG  Director, [facility]  Professor of Internal [id number] [facility]  [address] [place] [address]

## 2022-02-28 ENCOUNTER — OFFICE VISIT (OUTPATIENT)
Dept: INTERNAL MEDICINE | Age: 67
End: 2022-02-28
Payer: MEDICARE

## 2022-02-28 VITALS
HEART RATE: 66 BPM | TEMPERATURE: 98.1 F | RESPIRATION RATE: 18 BRPM | HEIGHT: 67 IN | WEIGHT: 231 LBS | DIASTOLIC BLOOD PRESSURE: 66 MMHG | BODY MASS INDEX: 36.26 KG/M2 | OXYGEN SATURATION: 99 % | SYSTOLIC BLOOD PRESSURE: 107 MMHG

## 2022-02-28 DIAGNOSIS — J98.4 RESTRICTIVE LUNG DISEASE SECONDARY TO OBESITY: ICD-10-CM

## 2022-02-28 DIAGNOSIS — I10 ESSENTIAL HYPERTENSION: ICD-10-CM

## 2022-02-28 DIAGNOSIS — Z79.4 TYPE 2 DIABETES MELLITUS WITH DIABETIC AUTONOMIC NEUROPATHY, WITH LONG-TERM CURRENT USE OF INSULIN (HCC): ICD-10-CM

## 2022-02-28 DIAGNOSIS — E66.9 RESTRICTIVE LUNG DISEASE SECONDARY TO OBESITY: ICD-10-CM

## 2022-02-28 DIAGNOSIS — I50.32 CHRONIC HEART FAILURE WITH PRESERVED EJECTION FRACTION (HCC): ICD-10-CM

## 2022-02-28 DIAGNOSIS — Z79.4 TYPE 2 DIABETES MELLITUS WITH HYPERGLYCEMIA, WITH LONG-TERM CURRENT USE OF INSULIN (HCC): ICD-10-CM

## 2022-02-28 DIAGNOSIS — Z12.11 ENCOUNTER FOR SCREENING COLONOSCOPY: Primary | ICD-10-CM

## 2022-02-28 DIAGNOSIS — E11.43 TYPE 2 DIABETES MELLITUS WITH DIABETIC AUTONOMIC NEUROPATHY, WITH LONG-TERM CURRENT USE OF INSULIN (HCC): ICD-10-CM

## 2022-02-28 DIAGNOSIS — E11.65 TYPE 2 DIABETES MELLITUS WITH HYPERGLYCEMIA, WITH LONG-TERM CURRENT USE OF INSULIN (HCC): ICD-10-CM

## 2022-02-28 DIAGNOSIS — E03.9 ACQUIRED HYPOTHYROIDISM: ICD-10-CM

## 2022-02-28 DIAGNOSIS — Z00.00 MEDICARE ANNUAL WELLNESS VISIT, SUBSEQUENT: ICD-10-CM

## 2022-02-28 PROCEDURE — 99213 OFFICE O/P EST LOW 20 MIN: CPT | Performed by: STUDENT IN AN ORGANIZED HEALTH CARE EDUCATION/TRAINING PROGRAM

## 2022-02-28 PROCEDURE — G0439 PPPS, SUBSEQ VISIT: HCPCS | Performed by: STUDENT IN AN ORGANIZED HEALTH CARE EDUCATION/TRAINING PROGRAM

## 2022-02-28 RX ORDER — LANCETS 30 GAUGE
EACH MISCELLANEOUS
Qty: 200 EACH | Refills: 1 | Status: SHIPPED | OUTPATIENT
Start: 2022-02-28

## 2022-02-28 RX ORDER — BLOOD SUGAR DIAGNOSTIC
STRIP MISCELLANEOUS
Qty: 200 EACH | Refills: 1 | Status: SHIPPED | OUTPATIENT
Start: 2022-02-28

## 2022-02-28 RX ORDER — AMLODIPINE BESYLATE 5 MG/1
5 TABLET ORAL DAILY
Qty: 90 TABLET | Refills: 1 | Status: ON HOLD
Start: 2022-02-28 | End: 2022-03-16 | Stop reason: HOSPADM

## 2022-02-28 RX ORDER — ASPIRIN 81 MG/1
TABLET ORAL
Qty: 90 TABLET | Refills: 1 | Status: SHIPPED
Start: 2022-02-28 | End: 2022-08-31 | Stop reason: SDUPTHER

## 2022-02-28 RX ORDER — BUMETANIDE 2 MG/1
2 TABLET ORAL DAILY
Qty: 90 TABLET | Refills: 1 | Status: ON HOLD
Start: 2022-02-28 | End: 2022-03-16 | Stop reason: HOSPADM

## 2022-02-28 RX ORDER — LEVOTHYROXINE SODIUM 88 UG/1
88 TABLET ORAL DAILY
Qty: 90 TABLET | Refills: 1 | Status: SHIPPED
Start: 2022-02-28 | End: 2022-07-18 | Stop reason: SDUPTHER

## 2022-02-28 RX ORDER — LISINOPRIL 40 MG/1
40 TABLET ORAL DAILY
Qty: 90 TABLET | Refills: 1 | Status: ON HOLD
Start: 2022-02-28 | End: 2022-03-16 | Stop reason: HOSPADM

## 2022-02-28 RX ORDER — BLOOD SUGAR DIAGNOSTIC
STRIP MISCELLANEOUS
Qty: 100 EACH | Refills: 1 | Status: SHIPPED | OUTPATIENT
Start: 2022-02-28

## 2022-02-28 SDOH — ECONOMIC STABILITY: FOOD INSECURITY: WITHIN THE PAST 12 MONTHS, YOU WORRIED THAT YOUR FOOD WOULD RUN OUT BEFORE YOU GOT MONEY TO BUY MORE.: NEVER TRUE

## 2022-02-28 SDOH — ECONOMIC STABILITY: HOUSING INSECURITY
IN THE LAST 12 MONTHS, WAS THERE A TIME WHEN YOU DID NOT HAVE A STEADY PLACE TO SLEEP OR SLEPT IN A SHELTER (INCLUDING NOW)?: NO

## 2022-02-28 SDOH — ECONOMIC STABILITY: INCOME INSECURITY: IN THE LAST 12 MONTHS, WAS THERE A TIME WHEN YOU WERE NOT ABLE TO PAY THE MORTGAGE OR RENT ON TIME?: NO

## 2022-02-28 SDOH — ECONOMIC STABILITY: HOUSING INSECURITY: IN THE LAST 12 MONTHS, HOW MANY PLACES HAVE YOU LIVED?: 1

## 2022-02-28 SDOH — ECONOMIC STABILITY: FOOD INSECURITY: WITHIN THE PAST 12 MONTHS, THE FOOD YOU BOUGHT JUST DIDN'T LAST AND YOU DIDN'T HAVE MONEY TO GET MORE.: NEVER TRUE

## 2022-02-28 ASSESSMENT — LIFESTYLE VARIABLES
HOW OFTEN DO YOU HAVE A DRINK CONTAINING ALCOHOL: MONTHLY OR LESS
HOW MANY STANDARD DRINKS CONTAINING ALCOHOL DO YOU HAVE ON A TYPICAL DAY: 1 OR 2

## 2022-02-28 ASSESSMENT — PATIENT HEALTH QUESTIONNAIRE - PHQ9
SUM OF ALL RESPONSES TO PHQ9 QUESTIONS 1 & 2: 0
SUM OF ALL RESPONSES TO PHQ QUESTIONS 1-9: 0
SUM OF ALL RESPONSES TO PHQ QUESTIONS 1-9: 0
1. LITTLE INTEREST OR PLEASURE IN DOING THINGS: 0
2. FEELING DOWN, DEPRESSED OR HOPELESS: 0
SUM OF ALL RESPONSES TO PHQ QUESTIONS 1-9: 0
SUM OF ALL RESPONSES TO PHQ QUESTIONS 1-9: 0

## 2022-02-28 ASSESSMENT — SOCIAL DETERMINANTS OF HEALTH (SDOH): HOW HARD IS IT FOR YOU TO PAY FOR THE VERY BASICS LIKE FOOD, HOUSING, MEDICAL CARE, AND HEATING?: VERY HARD

## 2022-02-28 NOTE — PROGRESS NOTES
[facility]  [facility]    Attending Physician Statement:  Aileen Calderon M.D., F.A.C.P. I have discussed the case, including pertinent history and exam findings with the resident. I agree with the assessment, plan and orders as documented by the resident. Patient is seen for fu visit today. Last office notes reviewed, relative labs and imaging. Health maintenance issues of vaccinations, depression screening, tobacco cessation etc... covered  Medical annual  -- needs dental  No vision issues-- needs diabetic eye exam  Wife claims - ?hearing issues- could be selective hearing    Mood stable  Social determinants +  Access to food, no wieght loss    Better compliance meds  bp controlled  dm2 controlled  afib hx- in sinus now  No chf exac now    Vaccinations discussed  Cancer screening-- didn't get colon CA screening doue- due  Advanced directives etc.  Declined  Discussed other safety issues  Remainder of medical problems as per resident note.

## 2022-02-28 NOTE — PATIENT INSTRUCTIONS
Dear Mikitracy Pastor Bradleyfab,    Thank you for coming to your appointment today. I hope we have addressed all of your needs. Please make sure to do the following:  - Continue your medications as listed. We have decreased your Synthroid to 88 mcg, please take the new dose and discard the old one  - Referrals have been made to General Surgery: If you do not hear from the office in 1 week, please call the number listed. - We will see each other again in 3 months    Call for a sooner appointment if you develop any new or worsening symptoms. Have a great day! Sincerely,  Acosta Mace M.D  2/28/2022  11:14 AM     Personalized Preventive Plan for Nancy Rocha - 2/28/2022  Medicare offers a range of preventive health benefits. Some of the tests and screenings are paid in full while other may be subject to a deductible, co-insurance, and/or copay. Some of these benefits include a comprehensive review of your medical history including lifestyle, illnesses that may run in your family, and various assessments and screenings as appropriate. After reviewing your medical record and screening and assessments performed today your provider may have ordered immunizations, labs, imaging, and/or referrals for you. A list of these orders (if applicable) as well as your Preventive Care list are included within your After Visit Summary for your review. Other Preventive Recommendations:    · A preventive eye exam performed by an eye specialist is recommended every 1-2 years to screen for glaucoma; cataracts, macular degeneration, and other eye disorders. · A preventive dental visit is recommended every 6 months. · Try to get at least 150 minutes of exercise per week or 10,000 steps per day on a pedometer . · Order or download the FREE \"Exercise & Physical Activity: Your Everyday Guide\" from [employer]. Call 9-821.660.9300 or search [employer] online.   · You need 0381-0196 mg of calcium and 7789-6975 IU of vitamin D per day. It is possible to meet your calcium requirement with diet alone, but a vitamin D supplement is usually necessary to meet this goal.  · When exposed to the sun, use a sunscreen that protects against both UVA and UVB radiation with an SPF of 30 or greater. Reapply every 2 to 3 hours or after sweating, drying off with a towel, or swimming. · Always wear a seat belt when traveling in a car. Always wear a helmet when riding a bicycle or motorcycle.

## 2022-02-28 NOTE — PROGRESS NOTES
Pt screened positive for SDOH related to financial strain, transportation and or food insecurity and declined further contact for assessment/resources. Discharge instructions & AVS given to patient per Dr. Rogene Soulier.

## 2022-02-28 NOTE — PROGRESS NOTES
Medicare Annual Wellness Visit    Tato Fields is here for Medicare [id number] Megan Cueto was seen today for medicare aw. Diagnoses and all orders for this visit:    Encounter for screening colonoscopy  -     [facility]    Acquired hypothyroidism  -     levothyroxine (SYNTHROID) 88 MCG tablet; Take 1 tablet by mouth Daily    Restrictive lung disease secondary to obesity  -     mometasone-formoterol (DULERA) 200-5 MCG/ACT inhaler; Inhale 2 puffs into the lungs 2 times daily Rinse mouth after using. PAP medication. Essential hypertension  -     aspirin (ASPIR-LOW) 81 MG EC tablet; TAKE ONE TABLET BY MOUTH EVERY DAY  -     amLODIPine (NORVASC) 5 MG tablet; Take 1 tablet by mouth daily  -     lisinopril (PRINIVIL;ZESTRIL) 40 MG tablet; Take 1 tablet by mouth daily    Chronic heart failure with preserved ejection fraction (HCC)  -     bumetanide (BUMEX) 2 MG tablet; Take 1 tablet by mouth daily    Type 2 diabetes mellitus with hyperglycemia, with long-term current use of insulin (HCC)  -     Insulin Pen Needle (PEN NEEDLES) 32G X 6 MM MISC; Take insulin daily    Type 2 diabetes mellitus with diabetic autonomic neuropathy, with long-term current use of insulin (HCC)  -     Lancets MISC; Give Delica lancets. Tests twice a day A.M. And P.M  -     blood glucose test strips (ONETOUCH VERIO) strip; TEST IN THE MORNING AND IN THE EVENING    Medicare annual wellness visit, subsequent         Recommendations for Preventive Services Due: see orders and patient instructions/AVS.  Recommended screening schedule for the next 5-10 years is provided to the patient in written form: see Patient Instructions/AVS.     Return for Medicare Annual Wellness Visit in 1 year.      Subjective   The following acute and/or chronic problems were also addressed today:  HFpEF  - He was hospitalized on 9/17/2021 with decompensated HFpEF, received diuresis and was subsequently discharged  - Taking Bumex 2 mg daily  - Taking Toprol-XL 25 mg daily  - Taking Lisinopril 40 mg daily     HTN  - On Amlodipine 5 mg, Lisinopril 40 mg and Toprol-XL 25 mg  - /66 today     Obesity Hypoventilation Syndrome  - On nightly oxygen, stable on RA at this time  - Follows up with Dr. Natalia Cast, next visit 01/2022     Persistent AF  - [id number] = 4  - On Toprol-XL and Eliquis     Sinus Node Dysfunction s/p dual chamber pacemaker since 10/2017  - Follow up with EP  - Denies any symptoms     Hypothyroidism  - On Synthroid, had T4 too high on previous labs, to decrease dose     IDDM2  - Last A1c 6.7 on 9/2021  - Takes Basaglar 35U nightly and Metformin 1000 mg twice daily    HCM  - Agreeable with colonoscopy to get done this year  - States had shingles vaccine done 2 years ago, will fax records  - States he will see his ophthalmologist this upcoming month    Patient's complete Health Risk Assessment and screening values have been reviewed and are found in [facility]. The following problems were reviewed today and where indicated follow up appointments were made and/or referrals ordered.     Positive Risk Factor Screenings with Interventions:               General Health and ACP:  General  In general, how would you say your health is?: Fair  In the past 7 days, have you experienced any of the following: New or Increased Pain, New or Increased Fatigue, Loneliness, Social Isolation, Stress or Anger?: No  Do you get the social and emotional support that you need?: Yes  Do you have a Living Will?: (!) No    Advance Directives     Power of [address] [place] [address] Will ACP-Advance Directive ACP-Power of     Not on File Not on File Not on File Not on File      General Health Risk Interventions:  · No Living Will: Patient declines ACP discussion/assistance    Health Habits/Nutrition:     Physical Activity: Inactive    Days of Exercise per Week: 0 days    Minutes of Exercise per Session: 0 min     Have you lost any weight without trying in the past 3 months?: No  Body mass index: (!) 36.18  Have you seen the dentist within the past year?: (!) No    Health Habits/Nutrition Interventions:  · Inadequate physical activity:  patient is not ready to increase his/her physical activity level at this time  · Nutritional issues:  patient is not ready to address his/her nutritional/weight issues at this time  · Dental exam overdue:  patient encouraged to make appointment with his/her dentist    Hearing/Vision:  Do you or your family notice any trouble with your hearing that hasn't been managed with hearing aids?: (!) Yes  Do you have difficulty driving, watching TV, or doing any of your daily activities because of your eyesight?: No  Have you had an eye exam within the past year?: Appointment is scheduled  No exam data present    Hearing/Vision Interventions:  · Hearing concerns:  patient declines any further evaluation/treatment for hearing issues    Safety:  Do you have working smoke detectors?: (!) No  Do you have any tripping hazards - loose or unsecured carpets or rugs?: No  Do you have any tripping hazards - clutter in doorways, halls, or stairs?: No  Do you have either shower bars, grab bars, non-slip mats or non-slip surfaces in your shower or bathtub?: Yes  Do all of your stairways have a railing or banister?: Yes  Do you always fasten your seatbelt when you are in a car?: (!) No    Safety Interventions:  · Home safety tips provided    ADLs:  In the past 7 days, did you need help from others to perform any of the following everyday activities: Eating, dressing, grooming, bathing, toileting, or walking/balance?: No  In the past 7 days, did you need help from others to take care of any of the following: Laundry, housekeeping, banking/finances, shopping, telephone use, food preparation, transportation, or taking medications?: (!) Yes (wife helps fill pill box)  Select all that apply: (!) Taking Medications    ADL Interventions:  · Patient declines any further evaluation/treatment for this issue          Objective   Vitals:    02/28/22 1025   BP: 107/66   Site: Left Upper Arm   Position: Sitting   Cuff Size: Large Adult   Pulse: 66   Resp: 18   Temp: 98.1 °F (36.7 °C)   TempSrc: Oral   SpO2: 97%   Weight: 231 lb (104.8 kg)   Height: 5' 7\" (1.702 m)      Body mass index is 36.18 kg/m². No Known Allergies  Prior to Visit Medications    Medication Sig Taking? Authorizing Provider   levothyroxine (SYNTHROID) 88 MCG tablet Take 1 tablet by mouth Daily Yes Red Sin MD   mometasone-formoterol [facility]) 200-5 MCG/ACT inhaler Inhale 2 puffs into the lungs 2 times daily Rinse mouth after using. PAP medication. Yes Red Sin MD   aspirin (ASPIR-LOW) 81 MG EC tablet TAKE ONE TABLET BY MOUTH EVERY DAY Yes Red Sin MD   amLODIPine (NORVASC) 5 MG tablet Take 1 tablet by mouth daily Yes Red Sin MD   bumetanide (BUMEX) 2 MG tablet Take 1 tablet by mouth daily Yes Red Sin MD   lisinopril (PRINIVIL;ZESTRIL) 40 MG tablet Take 1 tablet by mouth daily Yes Red Sin MD   Insulin Pen Needle (PEN NEEDLES) 32G X 6 MM MISC Take insulin daily Yes Red Sin MD   Lancets MISC Give Delica lancets. Tests twice a day A.M.  And P.M Yes Red Sin MD   blood glucose test strips (ONETOUCH VERIO) strip TEST IN THE MORNING AND IN THE EVENING Yes Red Sin MD   tiotropium (Arvella Ends) 18 MCG inhalation capsule Inhale 1 capsule into the lungs daily Yes Kishan Hooks DO   insulin glargine (LANTUS SOLOSTAR) 100 UNIT/ML injection pen Inject 35 Units into the skin every morning Yes Red Sin MD   ELIQUIS 5 MG TABS tablet Take 1 tablet by mouth 2 times daily Yes Red Sin MD   metFORMIN (GLUCOPHAGE) 1000 MG tablet TAKE ONE TABLET BY MOUTH TWICE A DAY WITH MEALS Yes Red Sin MD   atorvastatin (LIPITOR) 40 MG tablet TAKE ONE TABLET BY MOUTH DAILY Yes Red Sin MD metoprolol succinate (TOPROL XL) 25 MG extended release tablet Take 1 tablet by mouth daily Yes Julien Kaur MD   Nebulizers (NEBULIZER COMPRESSOR) MISC Please provide patient with nebulizer and tubing needed to use equipment. Yes Esvin Naik MD   Misc. Devices MISC Please add portability to current O2 order. Resp to evaluate patient for OCD device. Yes Dell Colindres MD   OXYGEN Inhale 2.5 L into the lungs nightly Yes Germaine Ruiz MD   Cholecalciferol (VITAMIN D3) 1000 units TABS TAKE TWO TABLETS BY MOUTH EVERY DAY Yes Germaine Ruiz MD   acetaminophen (TYLENOL) 500 MG tablet Take 1,000 mg by mouth daily as needed for Pain. Yes Historical Provider, MD   diclofenac sodium (VOLTAREN) 1 % GEL Apply 2 g topically 4 times daily  Patient not taking: Reported on 11/22/2021  Radha Franz MD   [facility]. Devices KIT Dispense 1 blood pressure cuff. Kacy Snell MD   zoster recombinant adjuvanted vaccine [facility]) 50 MCG/0.5ML SUSR injection 1 dose now and repeat in 2-6 months  Germaine Ruiz MD       [facility] (Including outside providers/suppliers regularly involved in providing care):   Patient Care Team:  Julien Kaur MD as PCP - Arnaldo Hoffmann MD as Surgeon (Trauma Surgery)  Donna Jenkins MD as Consulting Physician (Electrophysiology)  Lorrie Good RN as Registered Nurse  JACQUELINE Gilbert CNP as Nurse Practitioner (Cardiology)    Reviewed and updated this visit:  Tobacco  Allergies  Meds  Med Hx  Surg Hx  Soc Hx  Fam Hx                 Medicare Annual Wellness Visit    Viri Olsen is here for Medicare [address] [facility] was seen today for medicare awv. Diagnoses and all orders for this visit:    Encounter for screening colonoscopy  -     [facility]    Acquired hypothyroidism  -     levothyroxine (SYNTHROID) 88 MCG tablet;  Take 1 tablet by mouth Daily    Restrictive lung disease secondary to obesity  -     mometasone-formoterol (DULERA) 200-5 MCG/ACT inhaler; Inhale 2 puffs into the lungs 2 times daily Rinse mouth after using. PAP medication. Essential hypertension  -     aspirin (ASPIR-LOW) 81 MG EC tablet; TAKE ONE TABLET BY MOUTH EVERY DAY  -     amLODIPine (NORVASC) 5 MG tablet; Take 1 tablet by mouth daily  -     lisinopril (PRINIVIL;ZESTRIL) 40 MG tablet; Take 1 tablet by mouth daily    Chronic heart failure with preserved ejection fraction (HCC)  -     bumetanide (BUMEX) 2 MG tablet; Take 1 tablet by mouth daily    Type 2 diabetes mellitus with hyperglycemia, with long-term current use of insulin (HCC)  -     Insulin Pen Needle (PEN NEEDLES) 32G X 6 MM MISC; Take insulin daily    Type 2 diabetes mellitus with diabetic autonomic neuropathy, with long-term current use of insulin (HCC)  -     Lancets MISC; Give Delica lancets. Tests twice a day A.M. And P.M  -     blood glucose test strips (ONETOUCH VERIO) strip; TEST IN THE MORNING AND IN THE EVENING    Medicare annual wellness visit, subsequent         Recommendations for Preventive Services Due: see orders and patient instructions/AVS.  Recommended screening schedule for the next 5-10 years is provided to the patient in written form: see Patient Instructions/AVS.     Return for Medicare Annual Wellness Visit in 1 year. Subjective   The following acute and/or chronic problems were also addressed today:      Patient's complete Health Risk Assessment and screening values have been reviewed and are found in Flowsheets. The following problems were reviewed today and where indicated follow up appointments were made and/or referrals ordered.     Positive Risk Factor Screenings with Interventions:               General Health and ACP:  General  In general, how would you say your health is?: Fair  In the past 7 days, have you experienced any of the following: New or Increased Pain, New or Increased Fatigue, Loneliness, Social Isolation, Stress or Anger?: No  Do you get the social and emotional support that you need?: Yes  Do you have a Living Will?: (!) No    Advance Directives     Power of  Living Will ACP-Advance Directive ACP-Power of     Not on File Not on File Not on File Not on File      General Health Risk Interventions:  · No Living Will: Patient declines ACP discussion/assistance    Health Habits/Nutrition:     Physical Activity: Inactive    Days of Exercise per Week: 0 days    Minutes of Exercise per Session: 0 min     Have you lost any weight without trying in the past 3 months?: No  Body mass index: (!) 36.18  Have you seen the dentist within the past year?: (!) No    Health Habits/Nutrition Interventions:  · Inadequate physical activity:  patient is not ready to increase his/her physical activity level at this time  · Nutritional issues:  patient is not ready to address his/her nutritional/weight issues at this time    Hearing/Vision:  Do you or your family notice any trouble with your hearing that hasn't been managed with hearing aids?: (!) Yes  Do you have difficulty driving, watching TV, or doing any of your daily activities because of your eyesight?: No  Have you had an eye exam within the past year?: Appointment is scheduled  No exam data present    Hearing/Vision Interventions:  · Hearing concerns:  patient declines any further evaluation/treatment for hearing issues    Safety:  Do you have working smoke detectors?: (!) No  Do you have any tripping hazards - loose or unsecured carpets or rugs?: No  Do you have any tripping hazards - clutter in doorways, halls, or stairs?: No  Do you have either shower bars, grab bars, non-slip mats or non-slip surfaces in your shower or bathtub?: Yes  Do all of your stairways have a railing or banister?: Yes  Do you always fasten your seatbelt when you are in a car?: (!) No    Safety Interventions:  · Patient declines any further evaluation/treatment for this issue    ADLs:  In the past 7 days, did you need help from others to perform any of the following everyday activities: Eating, dressing, grooming, bathing, toileting, or walking/balance?: No  In the past 7 days, did you need help from others to take care of any of the following: Laundry, housekeeping, banking/finances, shopping, telephone use, food preparation, transportation, or taking medications?: (!) Yes (wife helps fill pill box)  Select all that apply: (!) Taking Medications    ADL Interventions:  · Patient declines any further evaluation/treatment for this issue          Objective   Vitals:    02/28/22 1025   BP: 107/66   Site: Left Upper Arm   Position: Sitting   Cuff Size: Large Adult   Pulse: 66   Resp: 18   Temp: 98.1 °F (36.7 °C)   TempSrc: Oral   SpO2: 97%   Weight: 231 lb (104.8 kg)   Height: 5' 7\" (1.702 m)      Body mass index is 36.18 kg/m². No Known Allergies  Prior to Visit Medications    Medication Sig Taking? Authorizing Provider   levothyroxine (SYNTHROID) 88 MCG tablet Take 1 tablet by mouth Daily Yes Nury Lam MD   mometasone-formoterol [facility]) 200-5 MCG/ACT inhaler Inhale 2 puffs into the lungs 2 times daily Rinse mouth after using. PAP medication. Yes Nury Lam MD   aspirin (ASPIR-LOW) 81 MG EC tablet TAKE ONE TABLET BY MOUTH EVERY DAY Yes Nury Lam MD   amLODIPine (NORVASC) 5 MG tablet Take 1 tablet by mouth daily Yes Nury Lam MD   bumetanide (BUMEX) 2 MG tablet Take 1 tablet by mouth daily Yes Nury Lam MD   lisinopril (PRINIVIL;ZESTRIL) 40 MG tablet Take 1 tablet by mouth daily Yes Nury Lam MD   Insulin Pen Needle (PEN NEEDLES) 32G X 6 MM MISC Take insulin daily Yes Nury Lam MD   Lancets MISC Give Delica lancets. Tests twice a day A.M.  And P.M Yes Nury Lam MD   blood glucose test strips (ONETOUCH VERIO) strip TEST IN THE MORNING AND IN THE EVENING Yes Nury Lam MD   tiotropium (SPIRIVA HANDIHALER) 18 MCG inhalation capsule Inhale 1 capsule into the lungs daily Yes Quan Hooks,    insulin glargine (LANTUS SOLOSTAR) 100 UNIT/ML injection pen Inject 35 Units into the skin every morning Yes Zion Peña MD   ELIQUIS 5 MG TABS tablet Take 1 tablet by mouth 2 times daily Yes Zion Peña MD   metFORMIN (GLUCOPHAGE) 1000 MG tablet TAKE ONE TABLET BY MOUTH TWICE A DAY WITH MEALS Yes Zion Peña MD   atorvastatin (LIPITOR) 40 MG tablet TAKE ONE TABLET BY MOUTH DAILY Yes Zion Peña MD   metoprolol succinate (TOPROL XL) 25 MG extended release tablet Take 1 tablet by mouth daily Yes Zion Peña MD   Nebulizers (NEBULIZER COMPRESSOR) MISC Please provide patient with nebulizer and tubing needed to use equipment. Yes Arcelia Waldrop MD   Misc. Devices MISC Please add portability to current O2 order. Resp to evaluate patient for OCD device. Yes Yoandy Peraza MD   OXYGEN Inhale 2.5 L into the lungs nightly Yes Juni Plaza MD   Cholecalciferol (VITAMIN D3) 1000 units TABS TAKE TWO TABLETS BY MOUTH EVERY DAY Yes Juni Plaza MD   acetaminophen (TYLENOL) 500 MG tablet Take 1,000 mg by mouth daily as needed for Pain. Yes Historical Provider, MD   diclofenac sodium (VOLTAREN) 1 % GEL Apply 2 g topically 4 times daily  Patient not taking: Reported on 11/22/2021  Be Mcclain MD   Misc. Devices KIT Dispense 1 blood pressure cuff.   Fadi Alves MD   zoster recombinant adjuvanted vaccine [facility]) 50 MCG/0.5ML SUSR injection 1 dose now and repeat in 2-6 months  Juni Plaza MD       [facility] (Including outside providers/suppliers regularly involved in providing care):   Patient Care Team:  Zion Peña MD as PCP - Ariadna Erickson MD as Surgeon (Trauma Surgery)  Pippa Crowe MD as Consulting Physician (Electrophysiology)  Gricel Jones RN as Registered Nurse  Alesia Dandy, APRN - CNP as Nurse Practitioner (Cardiology)    Reviewed and updated this visit:  Tobacco  Allergies  Meds  Med Hx  Surg Hx  Soc Hx Fam Hx

## 2022-03-01 ENCOUNTER — TELEPHONE (OUTPATIENT)
Dept: INTERNAL MEDICINE | Age: 67
End: 2022-03-01

## 2022-03-01 DIAGNOSIS — Z79.4 TYPE 2 DIABETES MELLITUS WITH DIABETIC AUTONOMIC NEUROPATHY, WITH LONG-TERM CURRENT USE OF INSULIN (HCC): Primary | ICD-10-CM

## 2022-03-01 DIAGNOSIS — E11.43 TYPE 2 DIABETES MELLITUS WITH DIABETIC AUTONOMIC NEUROPATHY, WITH LONG-TERM CURRENT USE OF INSULIN (HCC): Primary | ICD-10-CM

## 2022-03-01 RX ORDER — INSULIN GLARGINE 100 [IU]/ML
35 INJECTION, SOLUTION SUBCUTANEOUS EVERY MORNING
Qty: 15 PEN | Refills: 3
Start: 2022-03-01 | End: 2022-05-20 | Stop reason: SDUPTHER

## 2022-03-01 NOTE — TELEPHONE ENCOUNTER
Pt eligible for PAP Onur. Reviewed with Dr. Priya Koch. Ok to change Lantus to URBANARA with same doses. MAR updated to reflect change. Pt's wife notified of same. States he will be here tomorrow to .

## 2022-03-10 ENCOUNTER — HOSPITAL ENCOUNTER (INPATIENT)
Age: 67
LOS: 5 days | Discharge: HOME | DRG: 683 | End: 2022-03-16
Attending: STUDENT IN AN ORGANIZED HEALTH CARE EDUCATION/TRAINING PROGRAM | Admitting: INTERNAL MEDICINE
Payer: MEDICARE

## 2022-03-10 ENCOUNTER — TELEPHONE (OUTPATIENT)
Dept: INTERNAL MEDICINE | Age: 67
End: 2022-03-10

## 2022-03-10 DIAGNOSIS — N17.9 AKI (ACUTE KIDNEY INJURY) (HCC): Primary | ICD-10-CM

## 2022-03-10 LAB
ALBUMIN SERPL-MCNC: 4 G/DL (ref 3.5–5.2)
ALP BLD-CCNC: 89 U/L (ref 40–129)
ALT SERPL-CCNC: 22 U/L (ref 0–40)
ANION GAP SERPL CALCULATED.3IONS-SCNC: 17 MMOL/L (ref 7–16)
AST SERPL-CCNC: 15 U/L (ref 0–39)
BACTERIA: ABNORMAL /HPF
BASOPHILS ABSOLUTE: 0.08 E9/L (ref 0–0.2)
BASOPHILS RELATIVE PERCENT: 1 % (ref 0–2)
BILIRUB SERPL-MCNC: 0.5 MG/DL (ref 0–1.2)
BILIRUBIN URINE: NEGATIVE
BLOOD, URINE: NEGATIVE
BUN BLDV-MCNC: 63 MG/DL (ref 6–23)
CALCIUM SERPL-MCNC: 10.3 MG/DL (ref 8.6–10.2)
CHLORIDE BLD-SCNC: 97 MMOL/L (ref 98–107)
CHLORIDE URINE RANDOM: 65 MMOL/L
CLARITY: CLEAR
CO2: 23 MMOL/L (ref 22–29)
COLOR: YELLOW
CREAT SERPL-MCNC: 4.6 MG/DL (ref 0.7–1.2)
EOSINOPHILS ABSOLUTE: 0.41 E9/L (ref 0.05–0.5)
EOSINOPHILS RELATIVE PERCENT: 4.9 % (ref 0–6)
EPITHELIAL CELLS, UA: ABNORMAL /HPF
GFR AFRICAN AMERICAN: 15
GFR NON-AFRICAN AMERICAN: 13 ML/MIN/1.73
GLUCOSE BLD-MCNC: 138 MG/DL (ref 74–99)
GLUCOSE URINE: NEGATIVE MG/DL
HCT VFR BLD CALC: 31.4 % (ref 37–54)
HEMOGLOBIN: 10.1 G/DL (ref 12.5–16.5)
IMMATURE GRANULOCYTES #: 0.17 E9/L
IMMATURE GRANULOCYTES %: 2 % (ref 0–5)
KETONES, URINE: NEGATIVE MG/DL
LEUKOCYTE ESTERASE, URINE: NEGATIVE
LYMPHOCYTES ABSOLUTE: 1.74 E9/L (ref 1.5–4)
LYMPHOCYTES RELATIVE PERCENT: 20.8 % (ref 20–42)
MCH RBC QN AUTO: 30.1 PG (ref 26–35)
MCHC RBC AUTO-ENTMCNC: 32.2 % (ref 32–34.5)
MCV RBC AUTO: 93.5 FL (ref 80–99.9)
METER GLUCOSE: 124 MG/DL (ref 74–99)
MONOCYTES ABSOLUTE: 0.8 E9/L (ref 0.1–0.95)
MONOCYTES RELATIVE PERCENT: 9.6 % (ref 2–12)
NEUTROPHILS ABSOLUTE: 5.16 E9/L (ref 1.8–7.3)
NEUTROPHILS RELATIVE PERCENT: 61.7 % (ref 43–80)
NITRITE, URINE: NEGATIVE
OSMOLALITY URINE: 320 MOSM/KG (ref 300–900)
PDW BLD-RTO: 15.1 FL (ref 11.5–15)
PH UA: 5 (ref 5–9)
PLATELET # BLD: 228 E9/L (ref 130–450)
PMV BLD AUTO: 9.5 FL (ref 7–12)
POTASSIUM REFLEX MAGNESIUM: 5 MMOL/L (ref 3.5–5)
POTASSIUM, UR: 19.1 MMOL/L
PROTEIN UA: NEGATIVE MG/DL
RBC # BLD: 3.36 E12/L (ref 3.8–5.8)
RBC UA: ABNORMAL /HPF (ref 0–2)
SODIUM BLD-SCNC: 137 MMOL/L (ref 132–146)
SODIUM URINE: 76 MMOL/L
SPECIFIC GRAVITY UA: 1.02 (ref 1–1.03)
TOTAL PROTEIN: 7.6 G/DL (ref 6.4–8.3)
UROBILINOGEN, URINE: 0.2 E.U./DL
WBC # BLD: 8.4 E9/L (ref 4.5–11.5)
WBC UA: ABNORMAL /HPF (ref 0–5)

## 2022-03-10 PROCEDURE — 80053 COMPREHEN METABOLIC PANEL: CPT

## 2022-03-10 PROCEDURE — 84156 ASSAY OF PROTEIN URINE: CPT

## 2022-03-10 PROCEDURE — 99284 EMERGENCY DEPT VISIT MOD MDM: CPT

## 2022-03-10 PROCEDURE — G0378 HOSPITAL OBSERVATION PER HR: HCPCS

## 2022-03-10 PROCEDURE — 36415 COLL VENOUS BLD VENIPUNCTURE: CPT

## 2022-03-10 PROCEDURE — 96361 HYDRATE IV INFUSION ADD-ON: CPT

## 2022-03-10 PROCEDURE — 82570 ASSAY OF URINE CREATININE: CPT

## 2022-03-10 PROCEDURE — 85025 COMPLETE CBC W/AUTO DIFF WBC: CPT

## 2022-03-10 PROCEDURE — 96360 HYDRATION IV INFUSION INIT: CPT

## 2022-03-10 PROCEDURE — 2580000003 HC RX 258: Performed by: STUDENT IN AN ORGANIZED HEALTH CARE EDUCATION/TRAINING PROGRAM

## 2022-03-10 PROCEDURE — 84540 ASSAY OF URINE/UREA-N: CPT

## 2022-03-10 PROCEDURE — 82436 ASSAY OF URINE CHLORIDE: CPT

## 2022-03-10 PROCEDURE — 84300 ASSAY OF URINE SODIUM: CPT

## 2022-03-10 PROCEDURE — 82044 UR ALBUMIN SEMIQUANTITATIVE: CPT

## 2022-03-10 PROCEDURE — 82962 GLUCOSE BLOOD TEST: CPT

## 2022-03-10 PROCEDURE — 84133 ASSAY OF URINE POTASSIUM: CPT

## 2022-03-10 PROCEDURE — 83935 ASSAY OF URINE OSMOLALITY: CPT

## 2022-03-10 PROCEDURE — 81001 URINALYSIS AUTO W/SCOPE: CPT

## 2022-03-10 RX ORDER — 0.9 % SODIUM CHLORIDE 0.9 %
1000 INTRAVENOUS SOLUTION INTRAVENOUS ONCE
Status: COMPLETED | OUTPATIENT
Start: 2022-03-10 | End: 2022-03-10

## 2022-03-10 RX ADMIN — SODIUM CHLORIDE 1000 ML: 9 INJECTION, SOLUTION INTRAVENOUS at 20:22

## 2022-03-10 ASSESSMENT — ENCOUNTER SYMPTOMS
SHORTNESS OF BREATH: 0
EYE DISCHARGE: 0
CONSTIPATION: 0
RHINORRHEA: 0
SINUS PRESSURE: 0
BACK PAIN: 0
NAUSEA: 0
COUGH: 0
ABDOMINAL PAIN: 0
ABDOMINAL DISTENTION: 0
DIARRHEA: 0
CHEST TIGHTNESS: 0
ANAL BLEEDING: 0
VOMITING: 0
SINUS PAIN: 0

## 2022-03-10 ASSESSMENT — PAIN DESCRIPTION - LOCATION: LOCATION: ABDOMEN;PELVIS

## 2022-03-10 ASSESSMENT — PAIN SCALES - GENERAL: PAINLEVEL_OUTOF10: 0

## 2022-03-10 NOTE — TELEPHONE ENCOUNTER
Patient called stating he has been unable to urinate. Patient states last urination was yesterday am. Patient states he is drinking plenty of fluids and has tried tried several times today to urinate, but unable. Patient denies any past issues with urination. Patient concerned and slightly uncomfortable. Spoke with , patient needs to go to ED for evaluation and lab work. Patient notified , verbalizes understanding.

## 2022-03-10 NOTE — ED PROVIDER NOTES
HPI     Patient is a 74 y.o. male presents with a chief complaint of not urinating. This has been occurring for since 10 pm yesterday. Patient states that it gets better with nothing. Patient states that it gets worse with nothing. Patient states that it is mild in severity. Patient states it was gradual in onset. Patient states that he has not been able to urinate since 10:00 yesterday. Patient does note that he has attempted but nothing has come out. Patient previously has had low flow when he urinates. Patient is on the Lasix. Patient stated that he has been drinking coffee and is still not urinating. Patient is denying any new medicines. Patient denies any fevers, chills, nausea, vomiting, chest pain, shortness of breath, changes in bowel habits. Review of Systems   Constitutional: Negative for activity change, appetite change, fatigue and fever. HENT: Negative for congestion, rhinorrhea, sinus pressure and sinus pain. Eyes: Negative for discharge. Respiratory: Negative for cough, chest tightness and shortness of breath. Cardiovascular: Negative for chest pain and palpitations. Gastrointestinal: Negative for abdominal distention, abdominal pain, anal bleeding, constipation, diarrhea, nausea and vomiting. Endocrine: Negative for polydipsia and polyuria. Genitourinary: Positive for difficulty urinating. Negative for decreased urine volume, enuresis, flank pain, frequency and urgency. Musculoskeletal: Negative for arthralgias, back pain and neck stiffness. Skin: Negative for rash and wound. Neurological: Negative for dizziness, weakness, light-headedness and headaches. Psychiatric/Behavioral: Negative for agitation, behavioral problems and confusion. Physical Exam  Vitals and nursing note reviewed. Constitutional:       General: He is not in acute distress. Appearance: He is well-developed. He is obese. He is not ill-appearing.    HENT:      Head: Normocephalic and atraumatic. Eyes:      Conjunctiva/sclera: Conjunctivae normal.   Cardiovascular:      Rate and Rhythm: Normal rate and regular rhythm. Heart sounds: Normal heart sounds. No murmur heard. Pulmonary:      Effort: Pulmonary effort is normal. No respiratory distress. Breath sounds: Normal breath sounds. No wheezing or rales. Abdominal:      General: Bowel sounds are normal.      Palpations: Abdomen is soft. Tenderness: There is no abdominal tenderness. There is no guarding or rebound. Musculoskeletal:         General: No tenderness or deformity. Cervical back: Normal range of motion and neck supple. Skin:     General: Skin is warm and dry. Neurological:      Mental Status: He is alert and oriented to person, place, and time. Cranial Nerves: No cranial nerve deficit. Coordination: Coordination normal.          Procedures   Ultrasound on the bladder shows 30 cc of fluid. [facility]     ED Course as of 03/10/22 2323   Thu Mar 10, 2022   2156 Discussed admission with internal medicine agreed to meet patient. [JM]      ED Course User Index  [JM] Darlene Matson MD      Patient is a 74 y.o. male presenting with difficulty urinating. Patient stated that he is attempted but has not gotten any fluid out. Ultrasound was done of the bladder that showed 30 cc of urine. Patient drink significant amount of coffee daily. Patient was able to urinate while in the emergency room. Patient had a urine analysis. Patient was noted to have a significant RADHA with a creatinine over 4. Patient's baseline is 1.0. Patient had urine electrolytes drawn prior to giving fluids. Patient was then given a liter of fluids. Patient be admitted to internal medicine. Discussed this with internal medicine who agreed to admit patient. Patient was seen and evaluated by myself and my attending Sarah Piper DO.  Assessment and Plan discussed with attending provider, please see attestation for final plan of care. This note was done using dictation software and there may be some grammatical errors associated with this. Young Dunne MD       ED Course as of 03/10/22 2325   Thu Mar 10, 2022   2156 Discussed admission with internal medicine agreed to meet patient. [JM]      ED Course User Index  [JM] Young Dunne MD       --------------------------------------------- PAST HISTORY ---------------------------------------------  Past Medical History:  has a past medical history of Atrial fibrillation ([facility] [address] Carpal tunnel syndrome, Hyperlipidemia, Hypertension, Hypothyroidism, Lung nodule, Nocturnal hypoxemia due to obesity, Obesity, NIKO (obstructive sleep apnea), Osteoarthritis, Pinched nerve, Restrictive lung disease secondary to obesity, Sinus node dysfunction ([facility] [address] and Type II or unspecified type diabetes mellitus without mention of complication, not stated as uncontrolled. Past Surgical History:  has a past surgical history that includes Colonoscopy (2007); back surgery (2012); eye surgery; hernia repair; Colonoscopy (06/04/12); back surgery (05/30/2017); pacemaker placement (10/03/2017); and Rotator cuff repair (Right). Social History:  reports that he quit smoking about 17 years ago. His smoking use included cigarettes. He has a 3.50 pack-year smoking history. He has quit using smokeless tobacco. He reports that he does not drink alcohol and does not use drugs. Family History: family history includes Cancer in his brother and mother; Diabetes in his brother; Heart Disease in his mother; High Blood Pressure in his brother and father. The patients home medications have been reviewed. Allergies: Patient has no known allergies.     -------------------------------------------------- RESULTS -------------------------------------------------    LABS:  Results for orders placed or performed during the hospital encounter of 03/10/22   CBC with Auto Differential   Result Value Ref Range    WBC 8.4 4.5 - 11.5 E9/L    RBC 3.36 (L) 3.80 - 5.80 E12/L    Hemoglobin 10.1 (L) 12.5 - 16.5 g/dL    Hematocrit 31.4 (L) 37.0 - 54.0 %    MCV 93.5 80.0 - 99.9 fL    MCH 30.1 26.0 - 35.0 pg    MCHC 32.2 32.0 - 34.5 %    RDW 15.1 (H) 11.5 - 15.0 fL    Platelets 275 062 - 298 E9/L    MPV 9.5 7.0 - 12.0 fL    Neutrophils % 61.7 43.0 - 80.0 %    Immature Granulocytes % 2.0 0.0 - 5.0 %    Lymphocytes % 20.8 20.0 - 42.0 %    Monocytes % 9.6 2.0 - 12.0 %    Eosinophils % 4.9 0.0 - 6.0 %    Basophils % 1.0 0.0 - 2.0 %    Neutrophils Absolute 5.16 1.80 - 7.30 E9/L    Immature Granulocytes # 0.17 E9/L    Lymphocytes Absolute 1.74 1.50 - 4.00 E9/L    Monocytes Absolute 0.80 0.10 - 0.95 E9/L    Eosinophils Absolute 0.41 0.05 - 0.50 E9/L    Basophils Absolute 0.08 0.00 - 0.20 E9/L   Comprehensive Metabolic Panel w/ Reflex to MG   Result Value Ref Range    Sodium 137 132 - 146 mmol/L    Potassium reflex Magnesium 5.0 3.5 - 5.0 mmol/L    Chloride 97 (L) 98 - 107 mmol/L    CO2 23 22 - 29 mmol/L    Anion Gap 17 (H) 7 - 16 mmol/L    Glucose 138 (H) 74 - 99 mg/dL    BUN 63 (H) 6 - 23 mg/dL    CREATININE 4.6 (H) 0.7 - 1.2 mg/dL    GFR Non-African American 13 >=60 mL/min/1.73    GFR African American 15     Calcium 10.3 (H) 8.6 - 10.2 mg/dL    Total Protein 7.6 6.4 - 8.3 g/dL    Albumin 4.0 3.5 - 5.2 g/dL    Total Bilirubin 0.5 0.0 - 1.2 mg/dL    Alkaline Phosphatase 89 40 - 129 U/L    ALT 22 0 - 40 U/L    AST 15 0 - 39 U/L   Urinalysis with Microscopic   Result Value Ref Range    Color, UA Yellow Straw/Yellow    Clarity, UA Clear Clear    Glucose, Ur Negative Negative mg/dL    Bilirubin Urine Negative Negative    Ketones, Urine Negative Negative mg/dL    Specific Gravity, UA 1.025 1.005 - 1.030    Blood, Urine Negative Negative    pH, UA 5.0 5.0 - 9.0    Protein, UA Negative Negative mg/dL    Urobilinogen, Urine 0.2 <2.0 E.U./dL    Nitrite, Urine Negative Negative    Leukocyte Esterase, Urine Negative Negative    WBC, UA 1-3 0 - 5 /HPF    RBC, UA 1-3 0 - 2 /HPF    Epithelial Cells, UA FEW /HPF    Bacteria, UA FEW (A) None Seen /HPF   URINE ELECTROLYTES   Result Value Ref Range    Sodium, Ur 76 Not Established mmol/L    Potassium, Ur 19.1 Not Established mmol/L    Chloride 65 Not Established mmol/L   OSMOLALITY, URINE   Result Value Ref Range    Osmolality, Ur 320 300 - 900 mOsm/kg   POCT Glucose   Result Value Ref Range    Meter Glucose 124 (H) 74 - 99 mg/dL       RADIOLOGY:  No orders to display           ------------------------- NURSING NOTES AND VITALS REVIEWED ---------------------------  Date / Time Roomed:  3/10/2022  5:12 PM  ED Bed Assignment:  [id number]    The nursing notes within the ED encounter and vital signs as below have been reviewed. Patient Vitals for the past 24 hrs:   BP Temp Temp src Pulse Resp SpO2 Height Weight   03/10/22 2216 122/65 -- -- 76 18 96 % -- --   03/10/22 1613 (!) 105/55 97.4 °F (36.3 °C) Oral -- 17 -- 5' 7\" (1.702 m) 230 lb (104.3 kg)   03/10/22 1607 -- 97.4 °F (36.3 °C) Oral 81 -- 95 % -- --       Oxygen Saturation Interpretation: Normal    ------------------------------------------ PROGRESS NOTES ------------------------------------------  See ED course for reevaluation    Counseling:  I have spoken with the patient and discussed todays results, in addition to providing specific details for the plan of care and counseling regarding the diagnosis and prognosis. Their questions are answered at this time and they are agreeable with the plan of admission.    --------------------------------- ADDITIONAL PROVIDER NOTES ---------------------------------  Consultations:   Spoke with Dr. Stefano Godoy. Discussed case. They will admit the patient.   This patient's ED course included: a personal history and physicial examination, re-evaluation prior to disposition, multiple bedside re-evaluations, IV medications, cardiac monitoring and continuous pulse oximetry    This patient has remained hemodynamically stable during their ED course. Diagnosis:  1.  RADHA (acute kidney injury) ([facility] [address]        Disposition:  Patient's disposition: Admit to telemetry  Patient's condition is Stable         Delilah Sharif MD  Resident  03/10/22 8739

## 2022-03-11 ENCOUNTER — APPOINTMENT (OUTPATIENT)
Dept: ULTRASOUND IMAGING | Age: 67
DRG: 683 | End: 2022-03-11
Payer: MEDICARE

## 2022-03-11 PROBLEM — N17.9 ACUTE KIDNEY INJURY (HCC): Status: ACTIVE | Noted: 2022-03-11

## 2022-03-11 LAB
ABO/RH: NORMAL
ACETAMINOPHEN LEVEL: <5 MCG/ML (ref 10–30)
AMPHETAMINE SCREEN, URINE: NOT DETECTED
ANION GAP SERPL CALCULATED.3IONS-SCNC: 15 MMOL/L (ref 7–16)
ANION GAP SERPL CALCULATED.3IONS-SCNC: 16 MMOL/L (ref 7–16)
ANION GAP SERPL CALCULATED.3IONS-SCNC: 17 MMOL/L (ref 7–16)
ANION GAP SERPL CALCULATED.3IONS-SCNC: 18 MMOL/L (ref 7–16)
ANTIBODY SCREEN: NORMAL
APTT: 34.4 SEC (ref 24.5–35.1)
BARBITURATE SCREEN URINE: NOT DETECTED
BASOPHILS ABSOLUTE: 0.06 E9/L (ref 0–0.2)
BASOPHILS RELATIVE PERCENT: 0.8 % (ref 0–2)
BENZODIAZEPINE SCREEN, URINE: NOT DETECTED
BUN BLDV-MCNC: 65 MG/DL (ref 6–23)
BUN BLDV-MCNC: 67 MG/DL (ref 6–23)
BUN BLDV-MCNC: 67 MG/DL (ref 6–23)
BUN BLDV-MCNC: 68 MG/DL (ref 6–23)
CALCIUM SERPL-MCNC: 9.5 MG/DL (ref 8.6–10.2)
CALCIUM SERPL-MCNC: 9.5 MG/DL (ref 8.6–10.2)
CALCIUM SERPL-MCNC: 9.6 MG/DL (ref 8.6–10.2)
CALCIUM SERPL-MCNC: 9.6 MG/DL (ref 8.6–10.2)
CANNABINOID SCREEN URINE: NOT DETECTED
CHLORIDE BLD-SCNC: 95 MMOL/L (ref 98–107)
CHLORIDE BLD-SCNC: 97 MMOL/L (ref 98–107)
CHLORIDE BLD-SCNC: 97 MMOL/L (ref 98–107)
CHLORIDE BLD-SCNC: 98 MMOL/L (ref 98–107)
CO2: 19 MMOL/L (ref 22–29)
CO2: 21 MMOL/L (ref 22–29)
CO2: 22 MMOL/L (ref 22–29)
CO2: 23 MMOL/L (ref 22–29)
COCAINE METABOLITE SCREEN URINE: NOT DETECTED
CREAT SERPL-MCNC: 5.2 MG/DL (ref 0.7–1.2)
CREAT SERPL-MCNC: 5.4 MG/DL (ref 0.7–1.2)
CREAT SERPL-MCNC: 5.7 MG/DL (ref 0.7–1.2)
CREAT SERPL-MCNC: 5.8 MG/DL (ref 0.7–1.2)
CREATININE URINE: 96 MG/DL (ref 40–278)
CREATININE URINE: 97 MG/DL (ref 40–278)
CREATININE URINE: 97 MG/DL (ref 40–278)
EKG ATRIAL RATE: 77 BPM
EKG Q-T INTERVAL: 460 MS
EKG QRS DURATION: 160 MS
EKG QTC CALCULATION (BAZETT): 460 MS
EKG R AXIS: -80 DEGREES
EKG T AXIS: 80 DEGREES
EKG VENTRICULAR RATE: 60 BPM
EOSINOPHILS ABSOLUTE: 0.35 E9/L (ref 0.05–0.5)
EOSINOPHILS RELATIVE PERCENT: 4.4 % (ref 0–6)
ETHANOL: <10 MG/DL (ref 0–0.08)
FENTANYL SCREEN, URINE: NOT DETECTED
GFR AFRICAN AMERICAN: 12
GFR AFRICAN AMERICAN: 12
GFR AFRICAN AMERICAN: 13
GFR AFRICAN AMERICAN: 13
GFR NON-AFRICAN AMERICAN: 10 ML/MIN/1.73
GFR NON-AFRICAN AMERICAN: 10 ML/MIN/1.73
GFR NON-AFRICAN AMERICAN: 11 ML/MIN/1.73
GFR NON-AFRICAN AMERICAN: 11 ML/MIN/1.73
GLUCOSE BLD-MCNC: 177 MG/DL (ref 74–99)
GLUCOSE BLD-MCNC: 179 MG/DL (ref 74–99)
GLUCOSE BLD-MCNC: 182 MG/DL (ref 74–99)
GLUCOSE BLD-MCNC: 189 MG/DL (ref 74–99)
HBA1C MFR BLD: 7 % (ref 4–5.6)
HCT VFR BLD CALC: 28.7 % (ref 37–54)
HEMOGLOBIN: 9.2 G/DL (ref 12.5–16.5)
IMMATURE GRANULOCYTES #: 0.13 E9/L
IMMATURE GRANULOCYTES %: 1.6 % (ref 0–5)
INR BLD: 1.3
LACTIC ACID: 1.9 MMOL/L (ref 0.5–2.2)
LYMPHOCYTES ABSOLUTE: 1.59 E9/L (ref 1.5–4)
LYMPHOCYTES RELATIVE PERCENT: 20 % (ref 20–42)
Lab: NORMAL
MAGNESIUM: 1.3 MG/DL (ref 1.6–2.6)
MAGNESIUM: 1.3 MG/DL (ref 1.6–2.6)
MCH RBC QN AUTO: 30.7 PG (ref 26–35)
MCHC RBC AUTO-ENTMCNC: 32.1 % (ref 32–34.5)
MCV RBC AUTO: 95.7 FL (ref 80–99.9)
METER GLUCOSE: 129 MG/DL (ref 74–99)
METER GLUCOSE: 178 MG/DL (ref 74–99)
METER GLUCOSE: 190 MG/DL (ref 74–99)
METHADONE SCREEN, URINE: NOT DETECTED
MICROALBUMIN UR-MCNC: 109 MG/L
MICROALBUMIN/CREAT UR-RTO: 112.4 (ref 0–30)
MONOCYTES ABSOLUTE: 0.67 E9/L (ref 0.1–0.95)
MONOCYTES RELATIVE PERCENT: 8.4 % (ref 2–12)
NEUTROPHILS ABSOLUTE: 5.15 E9/L (ref 1.8–7.3)
NEUTROPHILS RELATIVE PERCENT: 64.8 % (ref 43–80)
OPIATE SCREEN URINE: NOT DETECTED
OXYCODONE URINE: NOT DETECTED
PDW BLD-RTO: 15.2 FL (ref 11.5–15)
PHENCYCLIDINE SCREEN URINE: NOT DETECTED
PHOSPHORUS: 7.4 MG/DL (ref 2.5–4.5)
PHOSPHORUS: 7.5 MG/DL (ref 2.5–4.5)
PLATELET # BLD: 207 E9/L (ref 130–450)
PMV BLD AUTO: 9.8 FL (ref 7–12)
POTASSIUM SERPL-SCNC: 5 MMOL/L (ref 3.5–5)
POTASSIUM SERPL-SCNC: 5.2 MMOL/L (ref 3.5–5)
POTASSIUM SERPL-SCNC: 5.3 MMOL/L (ref 3.5–5)
POTASSIUM SERPL-SCNC: 5.6 MMOL/L (ref 3.5–5)
PROTEIN PROTEIN: 32 MG/DL (ref 0–12)
PROTEIN/CREAT RATIO: 0.3
PROTEIN/CREAT RATIO: 0.3 (ref 0–0.2)
PROTHROMBIN TIME: 14.8 SEC (ref 9.3–12.4)
RBC # BLD: 3 E12/L (ref 3.8–5.8)
SALICYLATE, SERUM: <0.3 MG/DL (ref 0–30)
SODIUM BLD-SCNC: 132 MMOL/L (ref 132–146)
SODIUM BLD-SCNC: 135 MMOL/L (ref 132–146)
SODIUM BLD-SCNC: 135 MMOL/L (ref 132–146)
SODIUM BLD-SCNC: 136 MMOL/L (ref 132–146)
TOTAL CK: 50 U/L (ref 20–200)
TRICYCLIC ANTIDEPRESSANTS SCREEN SERUM: NEGATIVE NG/ML
UREA NITROGEN, UR: 168 MG/DL (ref 800–1666)
WBC # BLD: 8 E9/L (ref 4.5–11.5)

## 2022-03-11 PROCEDURE — 6370000000 HC RX 637 (ALT 250 FOR IP): Performed by: INTERNAL MEDICINE

## 2022-03-11 PROCEDURE — 90935 HEMODIALYSIS ONE EVALUATION: CPT | Performed by: INTERNAL MEDICINE

## 2022-03-11 PROCEDURE — 5A1D70Z PERFORMANCE OF URINARY FILTRATION, INTERMITTENT, LESS THAN 6 HOURS PER DAY: ICD-10-PCS | Performed by: INTERNAL MEDICINE

## 2022-03-11 PROCEDURE — 80143 DRUG ASSAY ACETAMINOPHEN: CPT

## 2022-03-11 PROCEDURE — 83605 ASSAY OF LACTIC ACID: CPT

## 2022-03-11 PROCEDURE — 84100 ASSAY OF PHOSPHORUS: CPT

## 2022-03-11 PROCEDURE — 80307 DRUG TEST PRSMV CHEM ANLYZR: CPT

## 2022-03-11 PROCEDURE — 86900 BLOOD TYPING SEROLOGIC ABO: CPT

## 2022-03-11 PROCEDURE — S5553 INSULIN LONG ACTING 5 U: HCPCS | Performed by: INTERNAL MEDICINE

## 2022-03-11 PROCEDURE — 80074 ACUTE HEPATITIS PANEL: CPT

## 2022-03-11 PROCEDURE — 86901 BLOOD TYPING SEROLOGIC RH(D): CPT

## 2022-03-11 PROCEDURE — 99231 SBSQ HOSP IP/OBS SF/LOW 25: CPT | Performed by: INTERNAL MEDICINE

## 2022-03-11 PROCEDURE — 82550 ASSAY OF CK (CPK): CPT

## 2022-03-11 PROCEDURE — 02HV33Z INSERTION OF INFUSION DEVICE INTO SUPERIOR VENA CAVA, PERCUTANEOUS APPROACH: ICD-10-PCS | Performed by: STUDENT IN AN ORGANIZED HEALTH CARE EDUCATION/TRAINING PROGRAM

## 2022-03-11 PROCEDURE — 96365 THER/PROPH/DIAG IV INF INIT: CPT

## 2022-03-11 PROCEDURE — 85730 THROMBOPLASTIN TIME PARTIAL: CPT

## 2022-03-11 PROCEDURE — 36415 COLL VENOUS BLD VENIPUNCTURE: CPT

## 2022-03-11 PROCEDURE — 93005 ELECTROCARDIOGRAM TRACING: CPT | Performed by: INTERNAL MEDICINE

## 2022-03-11 PROCEDURE — 94664 DEMO&/EVAL PT USE INHALER: CPT

## 2022-03-11 PROCEDURE — 93010 ELECTROCARDIOGRAM REPORT: CPT | Performed by: INTERNAL MEDICINE

## 2022-03-11 PROCEDURE — 85610 PROTHROMBIN TIME: CPT

## 2022-03-11 PROCEDURE — 76770 US EXAM ABDO BACK WALL COMP: CPT

## 2022-03-11 PROCEDURE — 86706 HEP B SURFACE ANTIBODY: CPT

## 2022-03-11 PROCEDURE — 99222 1ST HOSP IP/OBS MODERATE 55: CPT | Performed by: SURGERY

## 2022-03-11 PROCEDURE — 80048 BASIC METABOLIC PNL TOTAL CA: CPT

## 2022-03-11 PROCEDURE — 82077 ASSAY SPEC XCP UR&BREATH IA: CPT

## 2022-03-11 PROCEDURE — 86850 RBC ANTIBODY SCREEN: CPT

## 2022-03-11 PROCEDURE — 2580000003 HC RX 258

## 2022-03-11 PROCEDURE — 82962 GLUCOSE BLOOD TEST: CPT

## 2022-03-11 PROCEDURE — 83735 ASSAY OF MAGNESIUM: CPT

## 2022-03-11 PROCEDURE — 80179 DRUG ASSAY SALICYLATE: CPT

## 2022-03-11 PROCEDURE — 83036 HEMOGLOBIN GLYCOSYLATED A1C: CPT

## 2022-03-11 PROCEDURE — 2700000000 HC OXYGEN THERAPY PER DAY

## 2022-03-11 PROCEDURE — 51702 INSERT TEMP BLADDER CATH: CPT

## 2022-03-11 PROCEDURE — 2580000003 HC RX 258: Performed by: INTERNAL MEDICINE

## 2022-03-11 PROCEDURE — 96366 THER/PROPH/DIAG IV INF ADDON: CPT

## 2022-03-11 PROCEDURE — 1200000000 HC SEMI PRIVATE

## 2022-03-11 PROCEDURE — 6360000002 HC RX W HCPCS: Performed by: INTERNAL MEDICINE

## 2022-03-11 PROCEDURE — 85025 COMPLETE CBC W/AUTO DIFF WBC: CPT

## 2022-03-11 PROCEDURE — 6370000000 HC RX 637 (ALT 250 FOR IP)

## 2022-03-11 RX ORDER — ACETAMINOPHEN 650 MG/1
650 SUPPOSITORY RECTAL EVERY 6 HOURS PRN
Status: DISCONTINUED | OUTPATIENT
Start: 2022-03-11 | End: 2022-03-16 | Stop reason: HOSPADM

## 2022-03-11 RX ORDER — POLYETHYLENE GLYCOL 3350 17 G/17G
17 POWDER, FOR SOLUTION ORAL DAILY PRN
Status: DISCONTINUED | OUTPATIENT
Start: 2022-03-11 | End: 2022-03-16 | Stop reason: HOSPADM

## 2022-03-11 RX ORDER — LISINOPRIL 20 MG/1
40 TABLET ORAL DAILY
Status: DISCONTINUED | OUTPATIENT
Start: 2022-03-11 | End: 2022-03-16 | Stop reason: HOSPADM

## 2022-03-11 RX ORDER — ACETAMINOPHEN 325 MG/1
650 TABLET ORAL EVERY 6 HOURS PRN
Status: DISCONTINUED | OUTPATIENT
Start: 2022-03-11 | End: 2022-03-16 | Stop reason: HOSPADM

## 2022-03-11 RX ORDER — SODIUM CHLORIDE 9 MG/ML
INJECTION, SOLUTION INTRAVENOUS CONTINUOUS
Status: ACTIVE | OUTPATIENT
Start: 2022-03-11 | End: 2022-03-11

## 2022-03-11 RX ORDER — SODIUM CHLORIDE 9 MG/ML
INJECTION, SOLUTION INTRAVENOUS CONTINUOUS
Status: ACTIVE | OUTPATIENT
Start: 2022-03-11 | End: 2022-03-12

## 2022-03-11 RX ORDER — DEXTROSE MONOHYDRATE 25 G/50ML
12.5 INJECTION, SOLUTION INTRAVENOUS PRN
Status: DISCONTINUED | OUTPATIENT
Start: 2022-03-11 | End: 2022-03-16 | Stop reason: HOSPADM

## 2022-03-11 RX ORDER — ATORVASTATIN CALCIUM 40 MG/1
40 TABLET, FILM COATED ORAL DAILY
Status: DISCONTINUED | OUTPATIENT
Start: 2022-03-11 | End: 2022-03-16 | Stop reason: HOSPADM

## 2022-03-11 RX ORDER — NICOTINE POLACRILEX 4 MG
15 LOZENGE BUCCAL PRN
Status: DISCONTINUED | OUTPATIENT
Start: 2022-03-11 | End: 2022-03-16 | Stop reason: HOSPADM

## 2022-03-11 RX ORDER — IPRATROPIUM BROMIDE AND ALBUTEROL SULFATE 2.5; .5 MG/3ML; MG/3ML
1 SOLUTION RESPIRATORY (INHALATION) EVERY 4 HOURS PRN
Status: DISCONTINUED | OUTPATIENT
Start: 2022-03-11 | End: 2022-03-16 | Stop reason: HOSPADM

## 2022-03-11 RX ORDER — IPRATROPIUM BROMIDE AND ALBUTEROL SULFATE 2.5; .5 MG/3ML; MG/3ML
1 SOLUTION RESPIRATORY (INHALATION)
Status: DISCONTINUED | OUTPATIENT
Start: 2022-03-11 | End: 2022-03-11

## 2022-03-11 RX ORDER — AMLODIPINE BESYLATE 5 MG/1
5 TABLET ORAL DAILY
Status: DISCONTINUED | OUTPATIENT
Start: 2022-03-11 | End: 2022-03-16 | Stop reason: HOSPADM

## 2022-03-11 RX ORDER — SODIUM CHLORIDE 0.9 % (FLUSH) 0.9 %
5-40 SYRINGE (ML) INJECTION EVERY 12 HOURS SCHEDULED
Status: DISCONTINUED | OUTPATIENT
Start: 2022-03-11 | End: 2022-03-16 | Stop reason: HOSPADM

## 2022-03-11 RX ORDER — ONDANSETRON 2 MG/ML
4 INJECTION INTRAMUSCULAR; INTRAVENOUS EVERY 6 HOURS PRN
Status: DISCONTINUED | OUTPATIENT
Start: 2022-03-11 | End: 2022-03-16 | Stop reason: HOSPADM

## 2022-03-11 RX ORDER — SODIUM CHLORIDE 0.9 % (FLUSH) 0.9 %
5-40 SYRINGE (ML) INJECTION PRN
Status: DISCONTINUED | OUTPATIENT
Start: 2022-03-11 | End: 2022-03-16 | Stop reason: HOSPADM

## 2022-03-11 RX ORDER — SODIUM CHLORIDE 9 MG/ML
25 INJECTION, SOLUTION INTRAVENOUS PRN
Status: DISCONTINUED | OUTPATIENT
Start: 2022-03-11 | End: 2022-03-16 | Stop reason: HOSPADM

## 2022-03-11 RX ORDER — LEVOTHYROXINE SODIUM 88 UG/1
88 TABLET ORAL DAILY
Status: DISCONTINUED | OUTPATIENT
Start: 2022-03-11 | End: 2022-03-16 | Stop reason: HOSPADM

## 2022-03-11 RX ORDER — ONDANSETRON 4 MG/1
4 TABLET, ORALLY DISINTEGRATING ORAL EVERY 8 HOURS PRN
Status: DISCONTINUED | OUTPATIENT
Start: 2022-03-11 | End: 2022-03-16 | Stop reason: HOSPADM

## 2022-03-11 RX ORDER — BUMETANIDE 1 MG/1
2 TABLET ORAL DAILY
Status: DISCONTINUED | OUTPATIENT
Start: 2022-03-11 | End: 2022-03-16 | Stop reason: HOSPADM

## 2022-03-11 RX ORDER — INSULIN GLARGINE-YFGN 100 [IU]/ML
20 INJECTION, SOLUTION SUBCUTANEOUS
Status: DISCONTINUED | OUTPATIENT
Start: 2022-03-11 | End: 2022-03-12

## 2022-03-11 RX ORDER — DEXTROSE MONOHYDRATE 50 MG/ML
100 INJECTION, SOLUTION INTRAVENOUS PRN
Status: DISCONTINUED | OUTPATIENT
Start: 2022-03-11 | End: 2022-03-16 | Stop reason: HOSPADM

## 2022-03-11 RX ORDER — METOPROLOL SUCCINATE 25 MG/1
25 TABLET, EXTENDED RELEASE ORAL DAILY
Status: DISCONTINUED | OUTPATIENT
Start: 2022-03-11 | End: 2022-03-16 | Stop reason: HOSPADM

## 2022-03-11 RX ORDER — MAGNESIUM SULFATE IN WATER 40 MG/ML
2000 INJECTION, SOLUTION INTRAVENOUS ONCE
Status: COMPLETED | OUTPATIENT
Start: 2022-03-11 | End: 2022-03-11

## 2022-03-11 RX ADMIN — SODIUM CHLORIDE: 9 INJECTION, SOLUTION INTRAVENOUS at 15:47

## 2022-03-11 RX ADMIN — SODIUM ZIRCONIUM CYCLOSILICATE 10 G: 10 POWDER, FOR SUSPENSION ORAL at 22:00

## 2022-03-11 RX ADMIN — LEVOTHYROXINE SODIUM 88 MCG: 0.09 TABLET ORAL at 06:37

## 2022-03-11 RX ADMIN — INSULIN GLARGINE-YFGN 20 UNITS: 100 INJECTION, SOLUTION SUBCUTANEOUS at 06:37

## 2022-03-11 RX ADMIN — MAGNESIUM SULFATE HEPTAHYDRATE 2000 MG: 40 INJECTION, SOLUTION INTRAVENOUS at 10:19

## 2022-03-11 RX ADMIN — INSULIN LISPRO 1 UNITS: 100 INJECTION, SOLUTION INTRAVENOUS; SUBCUTANEOUS at 16:57

## 2022-03-11 RX ADMIN — SODIUM ZIRCONIUM CYCLOSILICATE 10 G: 10 POWDER, FOR SUSPENSION ORAL at 12:58

## 2022-03-11 RX ADMIN — Medication 10 ML: at 22:05

## 2022-03-11 RX ADMIN — ATORVASTATIN CALCIUM 40 MG: 40 TABLET, FILM COATED ORAL at 08:13

## 2022-03-11 RX ADMIN — IPRATROPIUM BROMIDE AND ALBUTEROL SULFATE 1 AMPULE: 2.5; .5 SOLUTION RESPIRATORY (INHALATION) at 09:46

## 2022-03-11 RX ADMIN — INSULIN LISPRO 1 UNITS: 100 INJECTION, SOLUTION INTRAVENOUS; SUBCUTANEOUS at 06:37

## 2022-03-11 RX ADMIN — APIXABAN 5 MG: 5 TABLET, FILM COATED ORAL at 08:13

## 2022-03-11 RX ADMIN — APIXABAN 5 MG: 5 TABLET, FILM COATED ORAL at 22:00

## 2022-03-11 RX ADMIN — SODIUM CHLORIDE: 9 INJECTION, SOLUTION INTRAVENOUS at 03:45

## 2022-03-11 RX ADMIN — AMLODIPINE BESYLATE 5 MG: 5 TABLET ORAL at 08:13

## 2022-03-11 RX ADMIN — METOPROLOL SUCCINATE 25 MG: 25 TABLET, EXTENDED RELEASE ORAL at 08:13

## 2022-03-11 RX ADMIN — Medication 10 ML: at 08:13

## 2022-03-11 ASSESSMENT — PAIN SCALES - GENERAL
PAINLEVEL_OUTOF10: 0

## 2022-03-11 NOTE — PLAN OF CARE
Problem: Health Behavior:  Goal: Compliance with treatment plan for underlying cause of condition will improve  Description: Compliance with treatment plan for underlying cause of condition will improve  Outcome: Ongoing  Goal: Identification of resources available to assist in meeting health care needs will improve  Description: Identification of resources available to assist in meeting health care needs will improve  Outcome: Ongoing     Problem: Fluid Volume:  Goal: Maintenance of adequate hydration will improve  Description: Maintenance of adequate hydration will improve  Outcome: Ongoing  Goal: Ability to maintain a balanced intake and output will improve  Description: Ability to maintain a balanced intake and output will improve  Outcome: Ongoing     Problem: Urinary Elimination:  Goal: Skin integrity will improve  Description: Skin integrity will improve  Outcome: Ongoing  Goal: Ability to recognize the need to void and respond appropriately will improve  Description: Ability to recognize the need to void and respond appropriately will improve  Outcome: Ongoing  Goal: Will remain free from infection  Description: Will remain free from infection  Outcome: Ongoing  Goal: Incidence of incontinence will decrease  Description: Incidence of incontinence will decrease  Outcome: Ongoing     Problem: Coping:  Goal: Ability to adjust to condition or change in health will improve  Description: Ability to adjust to condition or change in health will improve  Outcome: Ongoing     Problem: Nutritional:  Goal: Maintenance of adequate nutrition will improve  Description: Maintenance of adequate nutrition will improve  Outcome: Ongoing  Goal: Progress toward achieving an optimal weight will improve  Description: Progress toward achieving an optimal weight will improve  Outcome: Ongoing     Problem: Skin Integrity:  Goal: Risk for impaired skin integrity will decrease  Description: Risk for impaired skin integrity will decrease  Outcome: Ongoing

## 2022-03-11 NOTE — PROGRESS NOTES
[address] [facility]  Internal Medicine Residency / [address]    Attending Physician Statement  I have discussed the case, including pertinent history and exam findings with the resident and the team.  I have seen and examined the patient and the key elements of the encounter have been performed by me. I agree with the assessment, plan and orders as documented by the resident. Out of room during rounds  VS stable   NO Urinary retention  Pre renal azotemia , Cr 5.7   from Bumex for Diastolic dysfunction  Hx of Diastolic CHF  Plan; Rehydrate and follow     Remainder of medical problems as per resident note.       Rachel Li MD [facility]  Internal Medicine Residency Faculty

## 2022-03-11 NOTE — H&P
management. Past Medical History:      Diagnosis Date    Atrial fibrillation ([address]     Carpal tunnel syndrome     Hyperlipidemia     Hypertension     Hypothyroidism     Lung nodule     Nocturnal hypoxemia due to obesity 8/8/2013    Obesity     NIKO (obstructive sleep apnea) 8/8/2013    [facility]    Osteoarthritis     Pinched nerve     Lumbar    Restrictive lung disease secondary to obesity 7/25/2016    Sinus node dysfunction (HCC)     Type II or unspecified type diabetes mellitus without mention of complication, not stated as uncontrolled        Past Surgical History:        Procedure Laterality Date    BACK SURGERY  2012    [facility]. José Antonio Lute nerve in back    BACK SURGERY  05/30/2017    COLONOSCOPY  2007    multiple colon polyps    COLONOSCOPY  06/04/12    COLONOSCOPY    EYE SURGERY      lennie cataracts implant    HERNIA REPAIR      umbilical    PACEMAKER PLACEMENT  10/03/2017    [employer] dual chamber    Dr. George File Right        Medications Prior to Admission:    Prior to Admission medications    Medication Sig Start Date End Date Taking? Authorizing Provider   insulin glargine (BASAGLAR KWIKPEN) 100 UNIT/ML injection pen Inject 35 Units into the skin every morning PAP Medication 3/1/22  Yes Dominique Coleman DO   levothyroxine (SYNTHROID) 88 MCG tablet Take 1 tablet by mouth Daily 2/28/22 8/27/22 Yes Nilay Faulkner MD   mometasone-formoterol [facility]) 200-5 MCG/ACT inhaler Inhale 2 puffs into the lungs 2 times daily Rinse mouth after using. PAP medication.  2/28/22  Yes Nilay Fauklner MD   aspirin (ASPIR-LOW) 81 MG EC tablet TAKE ONE TABLET BY MOUTH EVERY DAY 2/28/22  Yes Nilay Faulkner MD   amLODIPine (NORVASC) 5 MG tablet Take 1 tablet by mouth daily 2/28/22  Yes Nilay Faulkner MD   bumetanide (BUMEX) 2 MG tablet Take 1 tablet by mouth daily 2/28/22  Yes Nilay Faulkner MD   lisinopril (PRINIVIL;ZESTRIL) 40 MG tablet Take 1 tablet by mouth daily 2/28/22  Yes Christiano Lee MD   tiotropium (Adeline Neer) 18 MCG inhalation capsule Inhale 1 capsule into the lungs daily 1/4/22 1/4/23 Yes Kishan Hooks DO   ELIQUIS 5 MG TABS tablet Take 1 tablet by mouth 2 times daily 11/22/21  Yes Christiano Lee MD   metFORMIN (GLUCOPHAGE) 1000 MG tablet TAKE ONE TABLET BY MOUTH TWICE A DAY WITH MEALS 11/22/21  Yes Christiano Lee MD   atorvastatin (LIPITOR) 40 MG tablet TAKE ONE TABLET BY MOUTH DAILY 11/22/21  Yes Christiano Lee MD   metoprolol succinate (TOPROL XL) 25 MG extended release tablet Take 1 tablet by mouth daily 11/22/21  Yes Christiano Lee MD   diclofenac sodium (VOLTAREN) 1 % GEL Apply 2 g topically 4 times daily 9/27/21  Yes Charmaine Raymond MD   Cholecalciferol (VITAMIN D3) 1000 units TABS TAKE TWO TABLETS BY MOUTH EVERY DAY 7/19/19  Yes Trace Omer MD   acetaminophen (TYLENOL) 500 MG tablet Take 1,000 mg by mouth daily as needed for Pain. Yes Historical Provider, MD   Insulin Pen Needle (PEN NEEDLES) 32G X 6 MM MISC Take insulin daily 2/28/22   Christiano Lee MD   Lancets MISC Give Delica lancets. Tests twice a day A.M. And P.M 2/28/22   Chrisitano Lee MD   blood glucose test strips (ONETOUCH VERIO) strip TEST IN THE MORNING AND IN THE EVENING 2/28/22   Christiano Lee MD   Nebulizers (NEBULIZER COMPRESSOR) MISC Please provide patient with nebulizer and tubing needed to use equipment. 9/24/21   Jose L Higgins MD   Misc. Devices MISC Please add portability to current O2 order. Resp to evaluate patient for OCD device. 9/15/21   Shirley Conte MD   Misc. Devices KIT Dispense 1 blood pressure cuff. 2/23/21   Vinayak Hercules MD   OXYGEN Inhale 2.5 L into the lungs nightly 7/28/20   Trace Omer MD   zoster recombinant adjuvanted vaccine [facility]) 50 MCG/0.5ML SUSR injection 1 dose now and repeat in 2-6 months 7/19/19   Trace Omer MD       Allergies:  Patient has no known allergies.     Social History: TOBACCO:   reports that he quit smoking about 17 years ago. His smoking use included cigarettes. He has a 3.50 pack-year smoking history. He has quit using smokeless tobacco.  ETOH:   reports no history of alcohol use. OCCUPATION: retired      Family History:       Problem Relation Age of Onset    Cancer Mother     Heart Disease Mother     High Blood Pressure Father     Cancer Brother     High Blood Pressure Brother     Diabetes Brother        REVIEW OF SYSTEMS:    · Constitutional: No fever, no chills, no change in weight; good appetite  · HEENT: No blurred vision, no ear problems, no sore throat, no rhinorrhea. · Respiratory: No cough, no sputum production, no pleuritic chest pain, no shortness of breath  · Cardiology: No angina, no dyspnea on exertion, no paroxysmal nocturnal dyspnea, no orthopnea, no palpitation, no leg swelling. · Gastroenterology: No dysphagia, no reflux; no abdominal pain, no nausea or vomiting; no constipation or diarrhea. No hematochezia   · Genitourinary: No dysuria, no frequency, hesitancy; no hematuria.  +urinary retention   · Musculoskeletal: no joint pain, no myalgia, no change in range of movement  · Neurology: no focal weakness in extremities, no slurred speech, no double vision, no tingling or numbness sensation  · Endocrinology: no temperature intolerance, no polyphagia, polydipsia or polyuria  · Hematology: no increased bleeding, no bruising, no lymphadenopathy  · Skin: no skin changes noticed by patient  · Psychology: no depressed mood, no suicidal ideation    Physical Exam   · Vitals: /69   Pulse 60   Temp 97.7 °F (36.5 °C) (Temporal)   Resp 16   Ht 5' 7\" (1.702 m)   Wt 230 lb (104.3 kg)   SpO2 97%   BMI 36.02 kg/m²     · General Appearance: alert and oriented to person, place and time, well developed and well- nourished, in no acute distress  · Skin: warm and dry, no rash or erythema  · Head: normocephalic and atraumatic  · Eyes: pupils equal, round, and reactive to light, extraocular eye movements intact, conjunctivae normal  · ENT: tympanic membrane, external ear and ear canal normal bilaterally, nose without deformity, nasal mucosa and turbinates normal without polyps  · Neck: supple and non-tender without mass, no thyromegaly or thyroid nodules, no cervical lymphadenopathy  · Pulmonary/Chest: clear to auscultation bilaterally- no wheezes, rales or rhonchi, normal air movement, no respiratory distress  · Cardiovascular: normal rate, regular rhythm, normal S1 and S2, no murmurs, rubs, clicks, or gallops, distal pulses intact, no carotid bruits  · Abdomen: soft, non-tender, non-distended, normal bowel sounds, no masses or organomegaly  · Extremities: no cyanosis, clubbing or edema  · Musculoskeletal: normal range of motion, no joint swelling, deformity or tenderness  · Neurologic: reflexes normal and symmetric, no cranial nerve deficit, gait, coordination and speech normal   Labs and Imaging Studies   Basic Labs  Recent Labs     03/10/22  1628      K 5.0   CL 97*   CO2 23   BUN 63*   CREATININE 4.6*   GLUCOSE 138*   CALCIUM 10.3*       Recent Labs     03/10/22  1628   WBC 8.4   RBC 3.36*   HGB 10.1*   HCT 31.4*   MCV 93.5   MCH 30.1   MCHC 32.2   RDW 15.1*      MPV 9.5       CBC:   Lab Results   Component Value Date    WBC 8.4 03/10/2022    RBC 3.36 03/10/2022    HGB 10.1 03/10/2022    HCT 31.4 03/10/2022    MCV 93.5 03/10/2022    RDW 15.1 03/10/2022     03/10/2022     BMP:    Lab Results   Component Value Date     03/10/2022    K 5.0 03/10/2022    CL 97 03/10/2022    CO2 23 03/10/2022    BUN 63 03/10/2022       Imaging Studies:     No results found.     Telemetry reading: paced rhythm     Resident's Assessment and Plan     Pasha Church is a 74 y.o. male with PMHx of HFpEF, HTN, Obesity Hypoventilation Syndrome, Persistent AF, Sinus Note Dysfunction s/p dual chamber pacemaker since 2017, Hypothyroidism, Insulin-dependent DMT2 and of note, in 2017 pt required HD for 4 days in 2017, for severe hyperkalemia and possible rhabdomyolysis and then recovered from RADHA (ATN) and did not need further treatments who presents now with urinary retention for the last 24-48 hours and increase in BUN/Cr. 1. Urinary Retention 2/2 RADHA Stage 3 likely prerenal, r/o intrinsic and postrenal   2. Hx of HFpEF, home meds include Bumex 2 mg daily, Toprol-XL 25 mg daily, lisinopril 40 mg daily  3. Hx of HTN, on amlodipine 5 mg, lisinopril 40 mg and Toprol-XL 25 mg at home  4. Hx of Obesity Hypoventilation Syndrome, on nightly oxygen, stable on room air at this time  5. Hx of Persistent AF, [id number] score 4, on Toprol-XL and Eliquis  6. Hx of Sinus Note Dysfunction s/p dual chamber pacemaker since 2017, follows with EP  7. Hx of Hypothyroidism, on Synthroid  8. Hx of Insulin-dependent DMT2, A1c 6.7 on 9/2021, takes Basaglar 35 units nightly and Metformin 1000 mg twice daily    Plan  · F/u RADHA w/u. Calculate FeUrea (pt on Bumex at home). FeUrea consistent with prerenal.   · Prince catheter  · Monitor I/Os  · Monitor VS closely. · Retroperitoneal US. · Avoid nephrotoxic medications/ hold nephrotoxic medications including ACE-Is, ARBs, NSAIDs and nonessential meds. · S/p 1 L IVF. Will start  cc/hr. · Nephrology consult. · Recommend analysis of Urine sedimentation in AM.   · We will resume Lantus 20 units daily and start on low-dose sliding scale. POCT AC at bedtime  · Follow BMP closely. PT/OT evaluation: not indicated at this time  DVT prophylaxis/ GI prophylaxis: Eliquis/ Diet  Disposition: Admit to inpatient.      Berry Colindres DO, PGY-2  Attending physician: Dr. Ivis Dye physician: Dr. Ellis Dent

## 2022-03-11 NOTE — CONSULTS
Department of Internal Medicine  Nephrology Nurse Practitioner Consult Note      Reason for Consult: RADHA    Requesting Physician: Dr. Guzman Cook: Urinary retention     History Obtained From:  patient, electronic medical record    HISTORY OF PRESENT ILLNESS:    Briefly Mr. Rocha is a 49-year-old  man with history of HTN, DM Type II, history of ischemic ATN for which he required HD treatment for 4 days with adequate renal function recovery in May 2017, atrial fibrillation, HFpEF, NIKO, pacemaker placement, hypothyroidism, hyperlipidemia, status post back surgery x2, who was admitted on March 10th, 2022 for urinary retention, reported that he had not urinated since 10 pm the prior night. The patient reports to having diarrhea 2 times daily for the past week while still taking his home medications. A post void residual was obtained in the ER, <50cc. Patient received 2L of NS in the ED. Labs significant to this admission include: potassium 5, BUN 63, creatinine 4.6, anion gap 17 which is the reason for consultation. Home medications taken prior to admission include: bumex, lisinopril, metformin, metoprolol, cholecalciferol, amlodipine.       Past Medical History:        Diagnosis Date    Atrial fibrillation ([address]     Carpal tunnel syndrome     Hyperlipidemia     Hypertension     Hypothyroidism     Lung nodule     Nocturnal hypoxemia due to obesity 8/8/2013    Obesity     NIKO (obstructive sleep apnea) 8/8/2013    [facility]    Osteoarthritis     Pinched nerve     Lumbar    Restrictive lung disease secondary to obesity 7/25/2016    Sinus node dysfunction (HCC)     Type II or unspecified type diabetes mellitus without mention of complication, not stated as uncontrolled      Past Surgical History:        Procedure Laterality Date    BACK SURGERY  2012    [facility]. Pinched nerve in back    BACK SURGERY  05/30/2017    COLONOSCOPY  2007    multiple colon polyps    COLONOSCOPY  06/04/12    COLONOSCOPY    EYE SURGERY      lennie cataracts implant    HERNIA REPAIR      umbilical    PACEMAKER PLACEMENT  10/03/2017    Medtronic dual chamber    Dr. Ramiro Anaya Right      Current Medications:    Current Facility-Administered Medications: amLODIPine (NORVASC) tablet 5 mg, 5 mg, Oral, Daily  atorvastatin (LIPITOR) tablet 40 mg, 40 mg, Oral, Daily  [Held by provider] bumetanide (BUMEX) tablet 2 mg, 2 mg, Oral, Daily  apixaban (ELIQUIS) tablet 5 mg, 5 mg, Oral, BID  levothyroxine (SYNTHROID) tablet 88 mcg, 88 mcg, Oral, Daily  [Held by provider] lisinopril (PRINIVIL;ZESTRIL) tablet 40 mg, 40 mg, Oral, Daily  metoprolol succinate (TOPROL XL) extended release tablet 25 mg, 25 mg, Oral, Daily  ipratropium-albuterol (DUONEB) nebulizer solution 1 ampule, 1 ampule, Inhalation, Q4H WA  sodium chloride flush 0.9 % injection 5-40 mL, 5-40 mL, IntraVENous, 2 times per day  sodium chloride flush 0.9 % injection 5-40 mL, 5-40 mL, IntraVENous, PRN  0.9 % sodium chloride infusion, 25 mL, IntraVENous, PRN  ondansetron (ZOFRAN-ODT) disintegrating tablet 4 mg, 4 mg, Oral, Q8H PRN **OR** ondansetron (ZOFRAN) injection 4 mg, 4 mg, IntraVENous, Q6H PRN  acetaminophen (TYLENOL) tablet 650 mg, 650 mg, Oral, Q6H PRN **OR** acetaminophen (TYLENOL) suppository 650 mg, 650 mg, Rectal, Q6H PRN  polyethylene glycol (GLYCOLAX) packet 17 g, 17 g, Oral, Daily PRN  insulin glargine-yfgn (SEMGLEE-YFGN) injection vial 20 Units, 20 Units, SubCUTAneous, QAM AC  glucose (GLUTOSE) 40 % oral gel 15 g, 15 g, Oral, PRN  dextrose 50 % IV solution, 12.5 g, IntraVENous, PRN  glucagon (rDNA) injection 1 mg, 1 mg, IntraMUSCular, PRN  dextrose 5 % solution, 100 mL/hr, IntraVENous, PRN  insulin lispro (HUMALOG) injection vial 0-6 Units, 0-6 Units, SubCUTAneous, TID WC  insulin lispro (HUMALOG) injection vial 0-3 Units, 0-3 Units, SubCUTAneous, Nightly  0.9 % sodium chloride infusion, , IntraVENous, Continuous  magnesium sulfate 2000 mg in 50 mL IVPB premix, 2,000 mg, IntraVENous, Once  Allergies:  Patient has no known allergies. Social History:    TOBACCO:   reports that he quit smoking about 17 years ago. His smoking use included cigarettes. He has a 3.50 pack-year smoking history. He has quit using smokeless tobacco.  ETOH:   reports no history of alcohol use. Family History:       Problem Relation Age of Onset    Cancer Mother     Heart Disease Mother     High Blood Pressure Father     Cancer Brother     High Blood Pressure Brother     Diabetes Brother      REVIEW OF SYSTEMS:    CONSTITUTIONAL:  negative for  fevers, chills and fatigue  HEENT:  negative for  snoring and sore mouth  RESPIRATORY:  negative for  dyspnea and wheezing  CARDIOVASCULAR:  negative for  palpitations, fatigue, edema  GASTROINTESTINAL:  positive for diarrhea  GENITOURINARY:  negative for difficulty with urinating  MUSCULOSKELETAL:  negative for  decreased range of motion and muscle weakness  NEUROLOGICAL:  negative for headaches, speech problems, weakness and numbness  PHYSICAL EXAM:      Vitals:    VITALS:  BP (!) 122/55   Pulse 60   Temp 97.6 °F (36.4 °C) (Temporal)   Resp 16   Ht 5' 7\" (1.702 m)   Wt 230 lb (104.3 kg)   SpO2 97%   BMI 36.02 kg/m²   24HR INTAKE/OUTPUT:    Intake/Output Summary (Last 24 hours) at 3/11/2022 0905  Last data filed at 3/11/2022 0315  Gross per 24 hour   Intake --   Output 25 ml   Net -25 ml       Constitutional:  Alert, elderly, well developed, no acute distress  HEENT:  PERRLA, normocephalic, atraumtic  Respiratory:  CTAB  Cardiovascular/Edema:  S1/S2, RRR  Gastrointestinal:  Soft, rounded, non-tender  Neurologic:  AxOx3  Skin:  Warm, dry, dry mucous membranes   Other:  Prince- minimal urine noted, no edema    DATA:    CBC:   Lab Results   Component Value Date    WBC 8.0 03/11/2022    RBC 3.00 03/11/2022    HGB 9.2 03/11/2022    HCT 28.7 03/11/2022    MCV 95.7 03/11/2022    MCH 30.7 03/11/2022    MCHC 32.1 03/11/2022    RDW 15.2 03/11/2022     03/11/2022    MPV 9.8 03/11/2022     CMP:    Lab Results   Component Value Date     03/11/2022     03/11/2022    K 5.0 03/11/2022    K 5.2 03/11/2022    K 5.0 03/10/2022    CL 98 03/11/2022    CL 97 03/11/2022    CO2 23 03/11/2022    CO2 22 03/11/2022    BUN 65 03/11/2022    BUN 67 03/11/2022    CREATININE 5.2 03/11/2022    CREATININE 5.4 03/11/2022    GFRAA 13 03/11/2022    GFRAA 13 03/11/2022    LABGLOM 11 03/11/2022    LABGLOM 11 03/11/2022    GLUCOSE 177 03/11/2022    GLUCOSE 179 03/11/2022    GLUCOSE 133 04/18/2012    PROT 7.6 03/10/2022    LABALBU 4.0 03/10/2022    LABALBU 4.4 10/25/2011    CALCIUM 9.5 03/11/2022    CALCIUM 9.5 03/11/2022    BILITOT 0.5 03/10/2022    ALKPHOS 89 03/10/2022    AST 15 03/10/2022    ALT 22 03/10/2022     Magnesium:    Lab Results   Component Value Date    MG 1.3 03/11/2022    MG 1.3 03/11/2022     Phosphorus:    Lab Results   Component Value Date    PHOS 7.4 03/11/2022    PHOS 7.5 03/11/2022     Radiology Review:    US retroperitoneal 3/11/2022   1.  Diffuse, bilateral renal cortical thinning. BRIEF SUMMARY OF INITIAL CONSULT:  Briefly Mr. Rocha is a 49-year-old  man with history of HTN, DM Type II, history of ischemic ATN for which he required HD treatment for 4 days with adequate renal function recovery in May 2017, atrial fibrillation, HFpEF, NIKO, pacemaker placement, hypothyroidism, hyperlipidemia, status post back surgery x2, who was admitted on March 10th, 2022 for urinary retention, reported that he had not urinated since 10 pm the prior night. The patient reports to having diarrhea 2 times daily for the past week while still taking his home medications. A post void residual was obtained in the ER, <50cc. Patient received 2L of NS in the ED. Labs significant to this admission include: potassium 5, BUN 63, creatinine 4.6, anion gap 17 which is the reason for consultation.  Home medications taken prior to admission include: bumex, lisinopril, metformin, metoprolol, cholecalciferol, amlodipine. IMPRESSION/RECOMMENDATIONS:      1. RADHA Stage III (anuric), most likely ischemic ATN in the setting of prolonged diarrhea with diuretic and ACE inhibition administration. On admission, creatinine 4.6mg/dL, peaking at 5.7mg/dL. Patient remains anuric despite fluid resuscitation. Will consult vascular for temporary HD catheter placement and will start HD today for 2 hours. Continue NS at 125cc/hour. 2. CKD Stage 2 without proteinuria, as evidence by renal US demonstrating diffuse, bilateral renal cortical thinning. Baseline creatinine 1.1-1.3mg/dL. 3. HAGAMA 2/2 uremia, to start HD today. 4. Hyperkalemia 2/2 RADHA in the setting of ACE inhibition administration. Lokelama 10G x3 doses and will start HD today  5. Hypomagnesia 2/2 GI losses, to replace   6. HTN, on metoprolol and amlodipine  7. H/o vitamin d deficiency, on cholecalciferol  8. EFpEF 55-60%, will obtain pro-BNP  ------------------------------------------------------------------------------------------------------  9. Atrial fibrillation, on apixaban  10. DM type II, on insulin  11. Hypothyroidism, on levothyroxine   12. Hyperlipidemia, on statin  13. Nutrition: low potassium     Plan:    · Consult vascular surgery for temporary HD catheter  · Will start HD for 2 hours today  · Continue IVF, NS at 125cc/hour  · Give Lokelma 10g x 3 doses  · Will continue to monitor renal function for recovery  · Hold lisinopril  · Continue to monitor potassium level  · Replace magnesium  · Continue to monitor magnesium level  · Obtain pro-BNP  · Strict I's and O's    Thank you so much Dr. Perfecto Rincon for allowing us to participate in the care of Mr. Cindy Murphy.

## 2022-03-11 NOTE — CONSULTS
Vascular Surgery Consultation Note    Reason for Consult:      HPI :    This is a 74 y.o. male who is admitted to the hospital for treatment of urinary retention. Unfortunately developed acute renal failure requiring dialysis.   Dialysis catheter requested by the nephrology team.    ROS : Negative if blank [], Positive if [x]  General Vascular   [] Fevers [] Claudication (Blocks)   [] Chills [] Rest Pain   [] Weight Loss [] Tissue Loss   [] Chest Pain [] Clotting Disorder    [] SOB at rest [] Leg Swelling   [] SOB with exertion [] DVT/PE      [] Nausea    [] Vomitting [] Stroke/TIA   [] Abdominal Pain [] Focal weakness   [] Melena [] Slurred Speech   [] Hematochezia [] Vision Changes   [] Hematuria    [] Dysuria [] Hx of Central Catheters   [] Wears Glasses/Contacts  [] Dialysis and If so date initiated   [] Blindness     [] Hand Dominant   [] Difficulty swallowing        Past Medical History:   Diagnosis Date    Atrial fibrillation ([address]     Carpal tunnel syndrome     Hyperlipidemia     Hypertension     Hypothyroidism     Lung nodule     Nocturnal hypoxemia due to obesity 8/8/2013    Obesity     NIKO (obstructive sleep apnea) 8/8/2013    [facility]    Osteoarthritis     Pinched nerve     Lumbar    Restrictive lung disease secondary to obesity 7/25/2016    Sinus node dysfunction (HCC)     Type II or unspecified type diabetes mellitus without mention of complication, not stated as uncontrolled         Past Surgical History:   Procedure Laterality Date    BACK [facility]ERY  2012    [facility]. Gene sanchez in back    BACK SURGERY  05/30/2017    COLONOSCOPY  2007    multiple colon polyps    COLONOSCOPY  06/04/12    COLONOSCOPY    EYE SURGERY      lennie cataracts implant    HERNIA REPAIR      umbilical    PACEMAKER PLACEMENT  10/03/2017    Medtronic dual chamber    Dr. Calos Redman Right      Current Medications:    sodium chloride      dextrose      sodium chloride 125 mL/hr at 22 1547      sodium chloride flush, sodium chloride, ondansetron **OR** ondansetron, acetaminophen **OR** acetaminophen, polyethylene glycol, glucose, dextrose, glucagon (rDNA), dextrose, ipratropium-albuterol    amLODIPine  5 mg Oral Daily    atorvastatin  40 mg Oral Daily    [Held by provider] bumetanide  2 mg Oral Daily    apixaban  5 mg Oral BID    levothyroxine  88 mcg Oral Daily    [Held by provider] lisinopril  40 mg Oral Daily    metoprolol succinate  25 mg Oral Daily    sodium chloride flush  5-40 mL IntraVENous 2 times per day    insulin glargine  20 Units SubCUTAneous QAM AC    insulin lispro  0-6 Units SubCUTAneous TID WC    insulin lispro  0-3 Units SubCUTAneous Nightly    sodium zirconium cyclosilicate  10 g Oral BID        Allergies:  Patient has no known allergies. Social History     Socioeconomic History    Marital status:      Spouse name: Not on file    Number of children: Not on file    Years of education: Not on file    Highest education level: Not on file   Occupational History    Not on file   Tobacco Use    Smoking status: Former Smoker     Packs/day: 0.10     Years: 35.00     Pack years: 3.50     Types: Cigarettes     Quit date: 2004     Years since quittin.2    Smokeless tobacco: Former User   Vaping Use    Vaping Use: Never used   Substance and Sexual Activity    Alcohol use: No     Alcohol/week: 0.0 standard drinks    Drug use: No    Sexual activity: Yes     Partners: Female   Other Topics Concern    Not on file   Social History Narrative    Not on file     Social Determinants of Health     Financial Resource Strain: High Risk    Difficulty of Paying Living Expenses: Very hard   Food Insecurity: No Food Insecurity    Worried About Running Out of Food in the Last Year: Never true    Blaise of Food in the Last Year: Never true   Transportation Needs: No Transportation Needs    Lack of Transportation (Medical):  No    Lack of Transportation (Non-Medical):  No   Physical Activity: Inactive    Days of Exercise per Week: 0 days    Minutes of Exercise per Session: 0 min   Stress:     Feeling of Stress : Not on file   Social Connections:     Frequency of Communication with Friends and Family: Not on file    Frequency of Social Gatherings with Friends and Family: Not on file    Attends Zoroastrian Services: Not on file    Active Member of [address] [place] [address] or Organizations: Not on file    Attends Club or Organization Meetings: Not on file    Marital Status: Not on file   Intimate Partner Violence:     Fear of Current or Ex-Partner: Not on file    Emotionally Abused: Not on file    Physically Abused: Not on file    Sexually Abused: Not on file   Housing Stability: [address] Unable to Pay for Housing in the Last Year: No    Number of [place] in the Last Year: 1    Unstable Housing in the Last Year: No        Family History   Problem Relation Age of Onset    Cancer Mother     Heart Disease Mother     High Blood Pressure Father     Cancer Brother     High Blood Pressure Brother     Diabetes Brother        PHYSICAL EXAM:    BP (!) 122/55   Pulse 60   Temp 97.6 °F (36.4 °C) (Temporal)   Resp 16   Ht 5' 7\" (1.702 m)   Wt 230 lb (104.3 kg)   SpO2 94%   BMI 36.02 kg/m²   CONSTITUTIONAL:  awake and alert  Normal  EYES:  lids and lashes normal, sclera clear and conjunctiva normal  ENT:  normocepalic, without obvious abnormality, external ears without lesions  NECK:  supple, symmetrical, trachea midline,no jugular venous distension, no carotid bruits  HEMATOLOGIC/LYMPHATICS:  no cervical lymphadenopathy  LUNGS:  no increased work of breathing, good air exchange  CARDIOVASCULAR:  regular rate and rhythm no murmur noted  ABDOMEN:  soft, non-distended, non-tender, Aorta is not palpable  SKIN:  no bruising or bleeding  EXTREMITIES:     R femoral 2+ L femoral 2+     LABS:    Lab Results   Component Value Date    WBC 8.0 03/11/2022 HGB 9.2 (L) 03/11/2022    HCT 28.7 (L) 03/11/2022     03/11/2022    PROTIME 14.8 (H) 03/11/2022    INR 1.3 03/11/2022    K 5.3 (H) 03/11/2022    BUN 67 (H) 03/11/2022    CREATININE 5.8 (H) 03/11/2022       RADIOLOGY:    Assesment/Plan    Right femoral vein temporary hemodialysis catheter placed at bedside  Ok for dialysis    Alen Wu MD    Pt seen and examined  Temp jann in place  Catheter working - seen while in dialysis    Discussed with patient if long term catheter needed would place tunn line    Maged Traylor MD

## 2022-03-11 NOTE — PLAN OF CARE
Patient's chart updated to reflect:      .     - HF care plan, HF education points and HF discharge instructions.  -Orders: 2 gram sodium diet, daily weights, I/O.  -PCP and/or Cardiologist appointment to be scheduled within 7 days of hospital discharge.  -History of HFrEF, not active admission Dx  Larry Lee RN RN, BSN  Heart Failure Navigator

## 2022-03-11 NOTE — PROCEDURES
Jax Phillips is a 74 y.o. male patient. 1. RADHA (acute kidney injury) ([facility]      Past Medical History:   Diagnosis Date    Atrial fibrillation ([facility] [address]     Carpal tunnel syndrome     Hyperlipidemia     Hypertension     Hypothyroidism     Lung nodule     Nocturnal hypoxemia due to obesity 8/8/2013    Obesity     NIKO (obstructive sleep apnea) 8/8/2013    [facility]    Osteoarthritis     Pinched nerve     Lumbar    Restrictive lung disease secondary to obesity 7/25/2016    Sinus node dysfunction (HCC)     Type II or unspecified type diabetes mellitus without mention of complication, not stated as uncontrolled      Blood pressure (!) 122/55, pulse 60, temperature 97.6 °F (36.4 °C), temperature source Temporal, resp. rate 16, height 5' 7\" (1.702 m), weight 230 lb (104.3 kg), SpO2 94 %. Central Line    Date/Time: 3/11/2022 6:11 PM  Performed by: Doug Mcbride MD  Authorized by: Doug Mcbride MD   Consent: Written consent obtained. Risks and benefits: risks, benefits and alternatives were discussed  Consent given by: patient  Patient understanding: patient states understanding of the procedure being performed  Required items: required blood products, implants, devices, and special equipment available  Patient identity confirmed: provided demographic data  Time out: Immediately prior to procedure a \"time out\" was called to verify the correct patient, procedure, equipment, support staff and site/side marked as required.   Indications: vascular access  Anesthesia: local infiltration    Anesthesia:  Local Anesthetic: lidocaine 1% with epinephrine  Anesthetic total: 5 mL  Skin prep agent dried: skin prep agent completely dried prior to procedure  Sterile barriers: all five maximum sterile barriers used - cap, mask, sterile gown, sterile gloves, and large sterile sheet  Hand hygiene: hand hygiene performed prior to central venous catheter insertion  Location details: right femoral  Patient position: flat  Catheter type: double lumen  Catheter size: 14 Fr  Pre-procedure: landmarks identified  Ultrasound guidance: yes  Sterile ultrasound techniques: sterile gel and sterile probe covers were used  Number of attempts: 1  Successful placement: yes  Post-procedure: line sutured and dressing applied  Assessment: free fluid flow and blood return through all ports  Patient tolerance: patient tolerated the procedure well with no immediate complications          Lida Otero MD  3/11/2022    Jose Luis Loya MD

## 2022-03-11 NOTE — ED NOTES
Report given to [facility] RN on floor. Declines any further questions or concerns. Transport to floor requested.         Juany Mares RN  03/10/22 [id number]

## 2022-03-11 NOTE — CARE COORDINATION
3/11 Care Coordination: Pt presents to the ED for Urinary Retention. Patient was able to urinate while in the emergency room. Patient had a urine analysis. Patient was noted to have a significant RADHA with a creatinine over 4. Patient's baseline is 1.0. Pt in 2017 patient required HD for 4 days in the setting of ATN. Pt admit, Renal consult. Met with pt PTA pt lives with his wife. HAs O2 at home wears at night 4lnc. O2 is from [email or web address]. CM verified his O2 with them. Pt states was Independent, uses no Aides. He does have a walker at home. Plan is discharge Home. CM/SW will continue to follow for discharge planning.    César FLORESN,RN-CV-BC  [phone]

## 2022-03-11 NOTE — ED PROVIDER NOTES
ATTENDING PROVIDER ATTESTATION:     Piter Rocha presented to the emergency department for evaluation of Urinary Retention (ha not urinated since lastnight)   and was initially evaluated by the Medical Resident. See Original ED Note for H&P and ED course above. I have reviewed and discussed the case, including pertinent history (medical, surgical, family and social) and exam findings with the Medical Resident assigned to Mireya Pascual. I have personally performed and/or participated in the history, exam, medical decision making, and procedures and agree with all pertinent clinical information and any additional changes or corrections are noted below in my assessment and plan. I have discussed this patient in detail with the resident, and provided the instruction and education,    I have reviewed my findings and recommendations with the assigned Medical Resident, Piter Rocha and members of family present at the time of disposition. I have performed a history and physical examination of this patient and directly supervised the resident caring for this patient. History of Present Illness: This patient presents to the ED for Decreased urination. The patient feels he is not completely emptying his bladder. This is been going for several days. Nothing makes it better or worse. Patient notes he drinks a pot of coffee per day. He drinks minimal to no water per his report. Denies any headache nausea vomiting or chest pain associated with it. Not makes it better or worse. No radiation of the symptoms.     Review of Systems:   A complete review of systems was performed and pertinent positives and negatives are stated within HPI, all other systems reviewed and are negative.    --------------------------------------------- PAST HISTORY ---------------------------------------------  Past Medical History:  has a past medical history of Atrial fibrillation ([address] Carpal tunnel syndrome, Hyperlipidemia, Hypertension, Hypothyroidism, Lung nodule, Nocturnal hypoxemia due to obesity, Obesity, NIKO (obstructive sleep apnea), Osteoarthritis, Pinched nerve, Restrictive lung disease secondary to obesity, Sinus node dysfunction ([facility] [address] and Type II or unspecified type diabetes mellitus without mention of complication, not stated as uncontrolled. Past Surgical History:  has a past surgical history that includes Colonoscopy (2007); back surgery (2012); eye surgery; hernia repair; Colonoscopy (06/04/12); back surgery (05/30/2017); pacemaker placement (10/03/2017); and Rotator cuff repair (Right). Social History:  reports that he quit smoking about 17 years ago. His smoking use included cigarettes. He has a 3.50 pack-year smoking history. He has quit using smokeless tobacco. He reports that he does not drink alcohol and does not use drugs. Family History: family history includes Cancer in his brother and mother; Diabetes in his brother; Heart Disease in his mother; High Blood Pressure in his brother and father. Unless otherwise noted, family history is non contributory    The patients home medications have been reviewed. Allergies: Patient has no known allergies. Physical Exam:  Constitutional: Appears in no distress  Head: Normocephalic, atraumatic  Eyes: Non-icteric slcera, no conjunctival injection  ENT: Moist mucous membranes,  Neck: Trachea midline, no JVD  Respiratory: Nonlabored respirations. Lungs clear to auscultation bilaterally, no wheezes, rales, or rhonchi. Cardiovascular: Regular rate. Regular rhythm. No murmurs, no gallops, no rubs. Gastrointestinal: Abdomen Soft, Non tender, Non distended. No rebound tenderness, guarding, or rigidity. Extremities: No lower extremity edema  Genitourinary: No CVA tenderness, no suprapubic tenderness  Musculoskeletal: Moves all extremities, no deformity  Skin: Pink, warm, dry without rash.   Neurologic: Alert, symmetric facies, no aphasia    My Medical Decision Making:   Patient presents emerge department decreased urination. Postvoid residual volume is less than 50 cc. Labs reviewed unremarkable for any acute concerning abnormalities. History physical exam findings are consistent with acute kidney injury, dehydration, urinary retention, urinary infection including cystitis. Work-up is initiated for this. Labs and imaging reviewed and remarkable for severe acute kidney injury with BUN of 63 creatinine 4.6 this is above the patient's previous recorded baseline at 1.1 for his creatinine. He has an anion gap metabolic acidosis at 17 likely due to uremia. No urinary tract infection is noted. Urine electrolyte studies are pending. Patient is hydrated will be admitted to medicine for further evaluation and management. IMPRESSION:   1. RADHA (acute kidney injury) ([facility] [address]        I, Dr. Vicente Damon, am the primary provider of record. I directly supervised any procedures performed by the resident and was present for the procedure including all critical portions of the procedure. Vicente Damon DO  Emergency Medicine    NOTE: This report was transcribed using voice recognition software.  Every effort was made to ensure accuracy; however, inadvertent computerized transcription errors may be present       Day Douglas DO  03/10/22 2122       Day Douglas DO  03/10/22 2143

## 2022-03-11 NOTE — ED NOTES
Unable to obtain IV access x2 attempts. Another RN to attempt.       Aby Angulo, KANDI  03/10/22 1956

## 2022-03-11 NOTE — PROGRESS NOTES
[facility]  Internal Medicine Residency Program  Progress Note - House Team     Patient:  Naveed Dale 74 y.o. male MRN: [id number]     Date of Service: 3/11/2022     CC: RADHA    Subjective   Brief Summary:  Presented to ED yesterday stating that he last urinated on 3/9 at 56. He reported drinking plenty of fluids. He denied history of urinary retention in the past. He also denied any feelings of bladder fullness at the time. He did not have any LE or sacral edema at the time of presentation. Per chart review, he received 4 days of dialysis in 2017 for ATN in setting of rhabdomyolysis and contrast nephropathy. Creatinine returned to baseline and resolved since then. He reported no recent contrast imaging. In ED he was noted to have increased creatinine of 4.6 from baseline of 1.1 and BUN of 63 from 30. Nephrology has been consulted and is following. Hospital Day: 2    Overnight Events:   Prince catheter placed -- 30 cc urine drained   Increased fluid rate to 125   Retroperitoneal US ordered   Nephrotoxic medications held    This AM   Patient was seen and examined this morning at bedside   Reports feeling well and is not in distress    Objective     Physical Exam:  · Vitals: BP (!) 122/55   Pulse 60   Temp 97.6 °F (36.4 °C) (Temporal)   Resp 16   Ht 5' 7\" (1.702 m)   Wt 230 lb (104.3 kg)   SpO2 94%   BMI 36.02 kg/m²     · I & O - 24hr: I/O this shift:  · In: 360 [P.O.:360]  · Out: -    · General Appearance: alert, appears stated age and cooperative  · HEENT:  Head: Normal, normocephalic, atraumatic. · Neck: no adenopathy  · Lung: clear to auscultation bilaterally  · Heart: regular rate and rhythm and S1, S2 normal  · Abdomen: +BS, soft, nontender  · Extremities:  extremities normal, atraumatic, no cyanosis or edema  · Musculokeletal: No joint swelling, no muscle tenderness. ROM normal in all joints of extremities.    · Neurologic: Mental status: Alert, oriented, thought content appropriate  Subject  Pertinent Labs & Imaging Studies   carmela  CBC:   Lab Results   Component Value Date    WBC 8.0 03/11/2022    RBC 3.00 03/11/2022    HGB 9.2 03/11/2022    HCT 28.7 03/11/2022    MCV 95.7 03/11/2022    MCH 30.7 03/11/2022    MCHC 32.1 03/11/2022    RDW 15.2 03/11/2022     03/11/2022    MPV 9.8 03/11/2022     CBC with Differential:    Lab Results   Component Value Date    WBC 8.0 03/11/2022    RBC 3.00 03/11/2022    HGB 9.2 03/11/2022    HCT 28.7 03/11/2022     03/11/2022    MCV 95.7 03/11/2022    MCH 30.7 03/11/2022    MCHC 32.1 03/11/2022    RDW 15.2 03/11/2022    SEGSPCT 68 04/27/2011    LYMPHOPCT 20.0 03/11/2022    MONOPCT 8.4 03/11/2022    EOSPCT 7 10/05/2010    BASOPCT 0.8 03/11/2022    MONOSABS 0.67 03/11/2022    LYMPHSABS 1.59 03/11/2022    EOSABS 0.35 03/11/2022    BASOSABS 0.06 03/11/2022     WBC:    Lab Results   Component Value Date    WBC 8.0 03/11/2022     Platelets:    Lab Results   Component Value Date     03/11/2022     Hemoglobin/Hematocrit:    Lab Results   Component Value Date    HGB 9.2 03/11/2022    HCT 28.7 03/11/2022     CMP:    Lab Results   Component Value Date     03/11/2022    K 5.6 03/11/2022    K 5.0 03/10/2022    CL 97 03/11/2022    CO2 21 03/11/2022    BUN 68 03/11/2022    CREATININE 5.7 03/11/2022    GFRAA 12 03/11/2022    LABGLOM 10 03/11/2022    GLUCOSE 189 03/11/2022    GLUCOSE 133 04/18/2012    PROT 7.6 03/10/2022    LABALBU 4.0 03/10/2022    LABALBU 4.4 10/25/2011    CALCIUM 9.6 03/11/2022    BILITOT 0.5 03/10/2022    ALKPHOS 89 03/10/2022    AST 15 03/10/2022    ALT 22 03/10/2022     BMP:    Lab Results   Component Value Date     03/11/2022    K 5.6 03/11/2022    K 5.0 03/10/2022    CL 97 03/11/2022    CO2 21 03/11/2022    BUN 68 03/11/2022    LABALBU 4.0 03/10/2022    LABALBU 4.4 10/25/2011    CREATININE 5.7 03/11/2022    CALCIUM 9.6 03/11/2022    GFRAA 12 03/11/2022    LABGLOM 10 03/11/2022    GLUCOSE 189 03/11/2022    GLUCOSE nephrotoxic medications  i. Bumex, lisinopril  g. Follow BMP  2. Hx of HFpEF  a. Home meds -- Bumex 2 mg, Toprol-XL 25 mg, lisinopril 40 mg  b. Hold Bumex, lisinopril  3. Hx HTN  a. Home meds -- amlodipine 5 mg, lisinopril 40 mg, Toprol-XL 25 mg  b. Hold lisinopril  4. Hx Obesity Hypoventilation Syndrome  a. Nightly O2  b. Currently on 3 L nasal cannula   5. Hx of Persistent Afib  a. HGH1GD0-BMMm score 4  b. Home meds -- Toprol-XL, Eliquis  6. Hx Sinus Node Dysfunction  a. Dual chamber pacemaker 2017  b. Follows EP  7. Hx Hypothyroidism  a. Home meds -- Synthroid  8. Hx Insulin-Dependent T2DM  a. Home meds -- Basaglar 35 units nightly, metformin 1000 mg BID  b. Resume Lantus 20 units daily, start low dose sliding scale  c.  POCT AC at bedtime      Diet: Regular adult  PT/OT evaluation: --  DVT prophylaxis/ GI prophylaxis: --  Disposition: continue current care    ANDRESSA Mckeon-III  Attending physician: Dr. Madi Barraza

## 2022-03-12 ENCOUNTER — APPOINTMENT (OUTPATIENT)
Dept: GENERAL RADIOLOGY | Age: 67
DRG: 683 | End: 2022-03-12
Payer: MEDICARE

## 2022-03-12 PROBLEM — Z99.2 ENCOUNTER REGARDING VASCULAR ACCESS FOR DIALYSIS FOR ESRD (HCC): Chronic | Status: ACTIVE | Noted: 2022-03-12

## 2022-03-12 PROBLEM — N18.6 ENCOUNTER REGARDING VASCULAR ACCESS FOR DIALYSIS FOR ESRD (HCC): Chronic | Status: ACTIVE | Noted: 2022-03-12

## 2022-03-12 LAB
ANION GAP SERPL CALCULATED.3IONS-SCNC: 15 MMOL/L (ref 7–16)
ANION GAP SERPL CALCULATED.3IONS-SCNC: 16 MMOL/L (ref 7–16)
ANION GAP SERPL CALCULATED.3IONS-SCNC: 17 MMOL/L (ref 7–16)
BUN BLDV-MCNC: 36 MG/DL (ref 6–23)
BUN BLDV-MCNC: 51 MG/DL (ref 6–23)
BUN BLDV-MCNC: 54 MG/DL (ref 6–23)
CALCIUM IONIZED: 1.23 MMOL/L (ref 1.15–1.33)
CALCIUM SERPL-MCNC: 8.4 MG/DL (ref 8.6–10.2)
CALCIUM SERPL-MCNC: 8.6 MG/DL (ref 8.6–10.2)
CALCIUM SERPL-MCNC: 8.6 MG/DL (ref 8.6–10.2)
CHLORIDE BLD-SCNC: 94 MMOL/L (ref 98–107)
CHLORIDE BLD-SCNC: 95 MMOL/L (ref 98–107)
CHLORIDE BLD-SCNC: 96 MMOL/L (ref 98–107)
CO2: 20 MMOL/L (ref 22–29)
CO2: 21 MMOL/L (ref 22–29)
CO2: 23 MMOL/L (ref 22–29)
CREAT SERPL-MCNC: 4.5 MG/DL (ref 0.7–1.2)
CREAT SERPL-MCNC: 5.2 MG/DL (ref 0.7–1.2)
CREAT SERPL-MCNC: 5.5 MG/DL (ref 0.7–1.2)
GFR AFRICAN AMERICAN: 13
GFR AFRICAN AMERICAN: 13
GFR AFRICAN AMERICAN: 16
GFR NON-AFRICAN AMERICAN: 10 ML/MIN/1.73
GFR NON-AFRICAN AMERICAN: 11 ML/MIN/1.73
GFR NON-AFRICAN AMERICAN: 13 ML/MIN/1.73
GLUCOSE BLD-MCNC: 105 MG/DL (ref 74–99)
GLUCOSE BLD-MCNC: 109 MG/DL (ref 74–99)
GLUCOSE BLD-MCNC: 200 MG/DL (ref 74–99)
HCT VFR BLD CALC: 33.3 % (ref 37–54)
HEMOGLOBIN: 10.5 G/DL (ref 12.5–16.5)
MAGNESIUM: 1.8 MG/DL (ref 1.6–2.6)
MCH RBC QN AUTO: 29.8 PG (ref 26–35)
MCHC RBC AUTO-ENTMCNC: 31.5 % (ref 32–34.5)
MCV RBC AUTO: 94.6 FL (ref 80–99.9)
METER GLUCOSE: 114 MG/DL (ref 74–99)
METER GLUCOSE: 129 MG/DL (ref 74–99)
METER GLUCOSE: 137 MG/DL (ref 74–99)
METER GLUCOSE: 168 MG/DL (ref 74–99)
PDW BLD-RTO: 15.1 FL (ref 11.5–15)
PHOSPHORUS: 5.7 MG/DL (ref 2.5–4.5)
PLATELET # BLD: 209 E9/L (ref 130–450)
PMV BLD AUTO: 9.7 FL (ref 7–12)
POTASSIUM SERPL-SCNC: 4.4 MMOL/L (ref 3.5–5)
POTASSIUM SERPL-SCNC: 4.6 MMOL/L (ref 3.5–5)
POTASSIUM SERPL-SCNC: 4.8 MMOL/L (ref 3.5–5)
PRO-BNP: 4751 PG/ML (ref 0–125)
RBC # BLD: 3.52 E12/L (ref 3.8–5.8)
SODIUM BLD-SCNC: 131 MMOL/L (ref 132–146)
SODIUM BLD-SCNC: 132 MMOL/L (ref 132–146)
SODIUM BLD-SCNC: 134 MMOL/L (ref 132–146)
WBC # BLD: 9.5 E9/L (ref 4.5–11.5)

## 2022-03-12 PROCEDURE — 84100 ASSAY OF PHOSPHORUS: CPT

## 2022-03-12 PROCEDURE — 2580000003 HC RX 258: Performed by: INTERNAL MEDICINE

## 2022-03-12 PROCEDURE — 99232 SBSQ HOSP IP/OBS MODERATE 35: CPT | Performed by: INTERNAL MEDICINE

## 2022-03-12 PROCEDURE — 85027 COMPLETE CBC AUTOMATED: CPT

## 2022-03-12 PROCEDURE — 1200000000 HC SEMI PRIVATE

## 2022-03-12 PROCEDURE — 36415 COLL VENOUS BLD VENIPUNCTURE: CPT

## 2022-03-12 PROCEDURE — 6370000000 HC RX 637 (ALT 250 FOR IP): Performed by: INTERNAL MEDICINE

## 2022-03-12 PROCEDURE — 82962 GLUCOSE BLOOD TEST: CPT

## 2022-03-12 PROCEDURE — 82330 ASSAY OF CALCIUM: CPT

## 2022-03-12 PROCEDURE — 83735 ASSAY OF MAGNESIUM: CPT

## 2022-03-12 PROCEDURE — 6370000000 HC RX 637 (ALT 250 FOR IP)

## 2022-03-12 PROCEDURE — 71045 X-RAY EXAM CHEST 1 VIEW: CPT

## 2022-03-12 PROCEDURE — S5553 INSULIN LONG ACTING 5 U: HCPCS | Performed by: INTERNAL MEDICINE

## 2022-03-12 PROCEDURE — 80048 BASIC METABOLIC PNL TOTAL CA: CPT

## 2022-03-12 PROCEDURE — 83880 ASSAY OF NATRIURETIC PEPTIDE: CPT

## 2022-03-12 PROCEDURE — 90935 HEMODIALYSIS ONE EVALUATION: CPT

## 2022-03-12 RX ORDER — INSULIN GLARGINE-YFGN 100 [IU]/ML
15 INJECTION, SOLUTION SUBCUTANEOUS
Status: DISCONTINUED | OUTPATIENT
Start: 2022-03-13 | End: 2022-03-16 | Stop reason: HOSPADM

## 2022-03-12 RX ADMIN — Medication 10 ML: at 20:28

## 2022-03-12 RX ADMIN — APIXABAN 5 MG: 5 TABLET, FILM COATED ORAL at 13:02

## 2022-03-12 RX ADMIN — METOPROLOL SUCCINATE 25 MG: 25 TABLET, EXTENDED RELEASE ORAL at 13:02

## 2022-03-12 RX ADMIN — INSULIN GLARGINE-YFGN 20 UNITS: 100 INJECTION, SOLUTION SUBCUTANEOUS at 06:14

## 2022-03-12 RX ADMIN — SODIUM ZIRCONIUM CYCLOSILICATE 10 G: 10 POWDER, FOR SUSPENSION ORAL at 13:22

## 2022-03-12 RX ADMIN — ATORVASTATIN CALCIUM 40 MG: 40 TABLET, FILM COATED ORAL at 13:02

## 2022-03-12 RX ADMIN — Medication 10 ML: at 13:17

## 2022-03-12 RX ADMIN — INSULIN LISPRO 1 UNITS: 100 INJECTION, SOLUTION INTRAVENOUS; SUBCUTANEOUS at 20:32

## 2022-03-12 RX ADMIN — APIXABAN 5 MG: 5 TABLET, FILM COATED ORAL at 20:27

## 2022-03-12 RX ADMIN — LEVOTHYROXINE SODIUM 88 MCG: 0.09 TABLET ORAL at 13:02

## 2022-03-12 ASSESSMENT — PAIN SCALES - GENERAL: PAINLEVEL_OUTOF10: 0

## 2022-03-12 NOTE — PROGRESS NOTES
This RN unable to assess patient as he was off the floor for dialysis during this RN coverage of patient the patient from 7a-11a. Report given to [employer].

## 2022-03-12 NOTE — PROGRESS NOTES
Department of Internal Medicine  Nephrology Progress Note      Events reviewed. Seen on dialysis and tolerating well. SUBJECTIVE:  We are following Mr. Rocha for RADHA on CKD. He reports no complaints. No SOB    PHYSICAL EXAM:      Vitals:    VITALS:  BP (!) 114/59   Pulse 60   Temp 97.9 °F (36.6 °C)   Resp 18   Ht 5' 7\" (1.702 m)   Wt 246 lb 4.1 oz (111.7 kg)   SpO2 94%   BMI 38.57 kg/m²   24HR INTAKE/OUTPUT:      Intake/Output Summary (Last 24 hours) at 3/12/2022 1102  Last data filed at 3/12/2022 1018  Gross per 24 hour   Intake 1070 ml   Output 1150 ml   Net -80 ml     Access: Right temporary dialysis catheter  Constitutional:  Alert, elderly, well developed, no acute distress  HEENT:  PERRLA, normocephalic, atraumtic  Respiratory:  CTAB  Cardiovascular/Edema:  S1/S2, RRR  Gastrointestinal:  Soft, rounded, non-tender  Neurologic:  AxOx3  Skin:  Warm, dry, dry mucous membranes   Other:  Prince- minimal urine noted, no edema  Scheduled Meds:   [START ON 3/13/2022] insulin glargine  15 Units SubCUTAneous QAM AC    [Held by provider] amLODIPine  5 mg Oral Daily    atorvastatin  40 mg Oral Daily    [Held by provider] bumetanide  2 mg Oral Daily    apixaban  5 mg Oral BID    levothyroxine  88 mcg Oral Daily    [Held by provider] lisinopril  40 mg Oral Daily    metoprolol succinate  25 mg Oral Daily    sodium chloride flush  5-40 mL IntraVENous 2 times per day    insulin lispro  0-6 Units SubCUTAneous TID WC    insulin lispro  0-3 Units SubCUTAneous Nightly    sodium zirconium cyclosilicate  10 g Oral BID     Continuous Infusions:   sodium chloride      dextrose       PRN Meds:.sodium chloride flush, sodium chloride, ondansetron **OR** ondansetron, acetaminophen **OR** acetaminophen, polyethylene glycol, glucose, dextrose, glucagon (rDNA), dextrose, ipratropium-albuterol    DATA:    CBC:   Lab Results   Component Value Date    WBC 9.5 03/12/2022    RBC 3.52 03/12/2022    HGB 10.5 03/12/2022    HCT 33.3 03/12/2022    MCV 94.6 03/12/2022    MCH 29.8 03/12/2022    MCHC 31.5 03/12/2022    RDW 15.1 03/12/2022     03/12/2022    MPV 9.7 03/12/2022     CMP:    Lab Results   Component Value Date     03/12/2022    K 4.8 03/12/2022    K 5.0 03/10/2022    CL 94 03/12/2022    CO2 20 03/12/2022    BUN 54 03/12/2022    CREATININE 5.5 03/12/2022    GFRAA 13 03/12/2022    LABGLOM 10 03/12/2022    GLUCOSE 109 03/12/2022    GLUCOSE 133 04/18/2012    PROT 7.6 03/10/2022    LABALBU 4.0 03/10/2022    LABALBU 4.4 10/25/2011    CALCIUM 8.6 03/12/2022    BILITOT 0.5 03/10/2022    ALKPHOS 89 03/10/2022    AST 15 03/10/2022    ALT 22 03/10/2022     Magnesium:    Lab Results   Component Value Date    MG 1.8 03/12/2022     Phosphorus:    Lab Results   Component Value Date    PHOS 5.7 03/12/2022     Radiology Review:    US retroperitoneal 3/11/2022   1.  Diffuse, bilateral renal cortical thinning. BRIEF SUMMARY OF INITIAL CONSULT:    Briefly Mr. Rocha is a 25-year-old  man with history of HTN, DM Type II, history of ischemic ATN for which he required HD treatment for 4 days with adequate renal function recovery in May 2017, atrial fibrillation, HFpEF, NIKO, pacemaker placement, hypothyroidism, hyperlipidemia, status post back surgery x2, who was admitted on March 10th, 2022 for urinary retention, reported that he had not urinated since 10 pm the prior night. The patient reports to having diarrhea 2 times daily for the past week while still taking his home medications. A post void residual was obtained in the ER, <50cc. Patient received 2L of NS in the ED. Labs significant to this admission include: potassium 5, BUN 63, creatinine 4.6, anion gap 17 which is the reason for consultation. Home medications taken prior to admission include: bumex, lisinopril, metformin, metoprolol, cholecalciferol, amlodipine. Problems resolved. · Hyperkalemia 2/2 RADHA in the setting of ACE inhibition administration.  Santino Cui 10G x3 doses and will start HD today    IMPRESSION/RECOMMENDATIONS:      1. RADHA Stage III (anuric), most likely ischemic ATN in the setting of prolonged diarrhea with diuretic and ACE inhibition administration. On admission, creatinine 4.6mg/dL, peaking at 5.8 mg/dL. Patient remains anuric despite fluid resuscitation. Right femoral temporary dialysis catheter placed 3/10 by Dr. Joseph Berumen followed by HD X 2hrs. For dialysis X 3 hrs today. Patient remains anuric. 2. Hyponatremia, 2/2 decreased free water excretion from decreased GFR (RADHA)     3. CKD Stage 2 without proteinuria, as evidence by renal US demonstrating diffuse, bilateral renal cortical thinning. Baseline creatinine 1.1-1.3mg/dL. 4. HAGAMA 2/2 uremia,  started on RRT 3/10 X2 hrs, for HD X3 hrs today. 5. Hypomagnesia 2/2 GI losses, to replace   6. HTN, on metoprolol and amlodipine  7. H/o vitamin d deficiency, on cholecalciferol  8. EFpEF 55-60%, proBNP 4,751  ------------------------------------------------------------------------------------------------------  9. Atrial fibrillation, on apixaban  10. DM type II, on insulin  11. Hypothyroidism, on levothyroxine   12. Hyperlipidemia, on statin  13. Nutrition: low potassium, 1L fluid restriction     Plan:    · Stop IVF  · For HD for 3 hours today  · Continue to monitor renal function for recovery  · Options of dialysis including peritoneal dialysis discussed with patient and he understands and agrees to have further education on PD.  Patient education printout on PD given to patient  · Continue to hold lisinopril  · Continue to monitor potassium level  · Continue to monitor magnesium level  · Monitor sodium levels  · Strict I's and O's  · Obtain phosphorus level in am    Electronically signed by JACQUELINE Paniagua CNP on 3/12/2022 at 11:04 AM     Patient seen and examined and agree with above as annotated  Discussed with KANDI Tsang MD

## 2022-03-12 NOTE — PROGRESS NOTES
[facility]  Internal Medicine Residency / [address]    Attending Physician Statement  I have discussed the case, including pertinent history and exam findings with the resident and the team.  I have seen and examined the patient and the key elements of the encounter have been performed by me. I agree with the assessment, plan and orders as documented by the resident. Case Discussed During AM Rounds   Presented with possible urinary retention with stating not urinating in several hours and worsening Cr- but no findings of acute urinary retention   Temporary HD placed and HD today    Nephrology following   No acute complaints    Currently on 4 L NC and HD initiated   Seen after HD and tolerating day # 1   No acute additional symptoms   Urinary catheter in place and monitoring U/O      RADHA on CKD necessitating initiation of inpatient HD   Temporary catheter placed   HD initiated today- day 1    Nephrology following    Holding diuresis and ACEI at this time    Holding BP med this am to monitor for toleration of HD     Normocytic anemia   Continue to monitor   No signs of bleeding     Hyponatremia   Repeat BMP this afternoon     HTN- stable   Holding meds     Type II DM   Continue current management     HFpEF    CXR after HD    Continue HD   Continue current meds     Remainder of medical problems as per resident note.       Sandeep Mares MD, [address]   Internal Medicine Residency Faculty

## 2022-03-12 NOTE — FLOWSHEET NOTE
03/11/22 2046   Vital Signs   BP (!) 114/57   Temp 98.5 °F (36.9 °C)   Resp 20   Weight 245 lb (111.1 kg)   Percent Weight Change 0   Post-Hemodialysis Assessment   Machine Disinfection Process Acid/Vinegar Clean;Bleach; Exterior Machine Disinfection;Machine Absence of Bleach Machine   Rinseback Volume (ml) 300 ml   Total Liters Processed (l/min) 24 l/min   Dialyzer Clearance Clear   Duration of Treatment (minutes) 120 minutes   Hemodialysis Intake (ml) 650 ml   Hemodialysis Output (ml) 650 ml   NET Removed (ml) 0 ml   Tolerated Treatment Good   Patient Response to Treatment Toleated well no distress   Bilateral Breath Sounds Diminished   Edema Generalized   Edema Generalized +2   Physician Notified?  No

## 2022-03-12 NOTE — FLOWSHEET NOTE
03/12/22 1018   Vital Signs   BP (!) 114/59   Temp 97.9 °F (36.6 °C)   Pulse 60   Resp 18   Weight 246 lb 4.1 oz (111.7 kg)   Weight Method Bed scale   Percent Weight Change 0   Post-Hemodialysis Assessment   Post-Treatment Procedures Blood returned;Catheter capped, clamped and heparinized x 2 ports   Machine Disinfection Process Bleach;Acid/Vinegar Clean;Exterior Machine Disinfection;Machine Absence of Bleach Machine   Rinseback Volume (ml) 300 ml   Total Liters Processed (l/min) 42.8 l/min   Dialyzer Clearance Clear   Duration of Treatment (minutes) 180 minutes   Hemodialysis Intake (ml) 300 ml   Hemodialysis Output (ml) 300 ml   NET Removed (ml) 0 ml   Tolerated Treatment Good   Patient Response to Treatment stasis achieved cath care per policy and procedure pt has no complaints and is to be transported back to room.

## 2022-03-12 NOTE — PROGRESS NOTES
[facility]  Internal Medicine Residency Program  Progress Note - House Team     Patient:  Yelena Hendrix 74 y.o. male MRN: [id number]     Date of Service: 3/12/2022     CC: Urinary retention  Overnight events: none     Subjective     Patient was seen and examined this morning at bedside in no acute distress. Patient was in dialysis this am. He notes that he is not in any pain. Concerns about going home. Objective     Physical Exam:  TEMPERATURE:  Current - Temp: 97.5 °F (36.4 °C); Max - Temp  Av.1 °F (36.7 °C)  Min: 97.4 °F (36.3 °C)  Max: 98.5 °F (36.9 °C)  RESPIRATIONS RANGE: Resp  Av.5  Min: 18  Max: 20  PULSE RANGE: Pulse  Av.9  Min: 57  Max: 79  BLOOD PRESSURE RANGE:  Systolic (35PKF), DFK:525 , Min:100 , AOQ:526   ; Diastolic (23NQA), VJB:09, Min:32, Max:63    PULSE OXIMETRY RANGE: SpO2  Av.3 %  Min: 94 %  Max: 95 %    I & O - 24hr:    Intake/Output Summary (Last 24 hours) at 3/12/2022 0800  Last data filed at 3/12/2022 8470  Gross per 24 hour   Intake 1130 ml   Output 850 ml   Net 280 ml     I/O last 3 completed shifts: In: 1130 [P.O.:480]  Out: 875 [Urine:225] No intake/output data recorded. Weight change: 15 lb (6.804 kg)    Physical Exam  Constitutional:       General: He is not in acute distress. Appearance: He is well-developed. He is not diaphoretic. HENT:      Head: Normocephalic and atraumatic. Eyes:      General: No scleral icterus. Pupils: Pupils are equal, round, and reactive to light. Neck:      Thyroid: No thyromegaly. Vascular: No JVD. Trachea: No tracheal deviation. Cardiovascular:      Rate and Rhythm: Normal rate and regular rhythm. Heart sounds: Normal heart sounds. No murmur heard. No friction rub. No gallop. Pulmonary:      Effort: Pulmonary effort is normal. No respiratory distress. Breath sounds: Normal breath sounds. No wheezing or rales.    Abdominal:      General: Bowel sounds are normal. There is no distension. Palpations: Abdomen is soft. There is no mass. Tenderness: There is no abdominal tenderness. There is no guarding or rebound. Genitourinary:     Comments: Prince in place  Musculoskeletal:         General: Normal range of motion. Skin:     General: Skin is warm and dry. Capillary Refill: Capillary refill takes less than 2 seconds. Coloration: Skin is not pale. Findings: No rash. Comments: Temp dialysis line noted   Neurological:      Mental Status: He is alert and oriented to person, place, and time. Cranial Nerves: No cranial nerve deficit. Psychiatric:         Behavior: Behavior normal.         Thought Content:  Thought content normal.         Judgment: Judgment normal.       Subject  Pertinent Labs & Imaging Studies   carmela  CBC:   Recent Labs     03/10/22  1628 03/11/22  0421 03/12/22  0553   WBC 8.4 8.0 9.5   HGB 10.1* 9.2* 10.5*   HCT 31.4* 28.7* 33.3*   MCV 93.5 95.7 94.6    207 209       BMP:    Recent Labs     03/11/22  1427 03/12/22  0030 03/12/22  0553    134 131*   K 5.3* 4.4 4.8   CL 95* 96* 94*   CO2 19* 23 20*   BUN 67* 51* 54*   CREATININE 5.8* 5.2* 5.5*   GLUCOSE 182* 105* 109*       LIVER PROFILE:   Recent Labs     03/10/22  1628   AST 15   ALT 22   BILITOT 0.5   ALKPHOS 89       PT/INR:   Recent Labs     03/11/22  1427   PROTIME 14.8*   INR 1.3       APTT:   Recent Labs     03/11/22  1427   APTT 34.4       Fasting Lipid Panel:    Lab Results   Component Value Date    CHOL 120 06/14/2021    TRIG 165 06/14/2021    HDL 24 06/14/2021       Notable Cultures:      Blood cultures   Blood Culture, Routine   Date Value Ref Range Status   09/17/2021 5 Days no growth  Final     Respiratory cultures No results found for: RESPCULTURE   Gram Stain Result   Date Value Ref Range Status   05/29/2017   Final    Gram stain performed on unspun fluidPolymorphonuclear leukocytes not seenEpithelial cells not seenRare Erythrocytes     Urine   Urine Culture, Routine   Date Value Ref Range Status   05/25/2017 Growth not present  Final     Legionella No results found for: LABLEGI  C Diff PCR No results found for: CDIFPCR  Wound culture/abscess: No results for input(s): WNDABS in the last 72 hours. Tip culture:No results for input(s): CXCATHTIP in the last 72 hours. US RETROPERITONEAL COMPLETE    Result Date: 3/11/2022  EXAMINATION: RETROPERITONEAL ULTRASOUND OF THE KIDNEYS AND URINARY BLADDER 3/11/2022 COMPARISON: None HISTORY: ORDERING SYSTEM PROVIDED HISTORY: r/o obstruction TECHNOLOGIST PROVIDED HISTORY: Reason for exam:->r/o obstruction What reading provider will be dictating this exam?->CRC FINDINGS: Kidneys: The right kidney measures 11.0 cm in length and the left kidney measures 12.1 cm in length. Kidneys demonstrate normal cortical echogenicity. No evidence of hydronephrosis or intrarenal stones. No renal mass is seen. There is diffuse, bilateral renal cortical thinning. No perinephric fluid is seen bilaterally. Bladder: The urinary bladder is collapsed and there is reportedly a Prince catheter present. 1.  Diffuse, bilateral renal cortical thinning. Resident's Assessment and Plan     Assessment and Plan:    Urinary retention secondary to RADHA stage III most likely ischemic ATN in the setting of prolonged diarrhea  Temporary HD cath placed last night  Patient received 2 hours dialysis yesterday  On dialysis today  Continue IV fluid normal saline at 125 cc an hour  Creatinine was 4.6 on admission peaking at 5.7.   Baseline 1.1-1.3  Creatinine this a.m. 5.5  Continue to monitor creatinine  Strict I's and O's  CKD stage II without proteinuria  Ultrasound showing diffuse bilateral renal cortical thinning  Strict I's and O's  Creatinine this a.m. 5.5  HAGMA secondary to uremia  On hemodialysis per nephrology  Hyperkalemia secondary to RADHA-resolved  Started on Jaky Parks  Repeat K4.4  Hypomag secondary to diarrhea  Replace as needed mg 1.8  Hypertension  On metoprolol and amlodipine. Hold lisinopril  History of vitamin D deficiency  History of HFpEF  proBNP 4751  History of A.  Fib  Continue Eliquis  History of type 2 diabetes  Sliding scale  History hypothyroidism  Continue Synthroid  History of hyperlipidemia  Continue statin    PT/OT evaluation:   DVT prophylaxis/ GI prophylaxis: eliquis/diet  Disposition: continue current care     Dillon Owens DO, PhD  PGY-1  Attending physician: Dr. Carrie Gracia

## 2022-03-13 LAB
ANION GAP SERPL CALCULATED.3IONS-SCNC: 11 MMOL/L (ref 7–16)
ANION GAP SERPL CALCULATED.3IONS-SCNC: 17 MMOL/L (ref 7–16)
BASOPHILS ABSOLUTE: 0.07 E9/L (ref 0–0.2)
BASOPHILS RELATIVE PERCENT: 0.8 % (ref 0–2)
BUN BLDV-MCNC: 38 MG/DL (ref 6–23)
BUN BLDV-MCNC: 41 MG/DL (ref 6–23)
CALCIUM IONIZED: 1.15 MMOL/L (ref 1.15–1.33)
CALCIUM SERPL-MCNC: 8.3 MG/DL (ref 8.6–10.2)
CALCIUM SERPL-MCNC: 8.6 MG/DL (ref 8.6–10.2)
CHLORIDE BLD-SCNC: 94 MMOL/L (ref 98–107)
CHLORIDE BLD-SCNC: 96 MMOL/L (ref 98–107)
CO2: 22 MMOL/L (ref 22–29)
CO2: 26 MMOL/L (ref 22–29)
CREAT SERPL-MCNC: 5.2 MG/DL (ref 0.7–1.2)
CREAT SERPL-MCNC: 5.3 MG/DL (ref 0.7–1.2)
EOSINOPHILS ABSOLUTE: 0.29 E9/L (ref 0.05–0.5)
EOSINOPHILS RELATIVE PERCENT: 3.2 % (ref 0–6)
GFR AFRICAN AMERICAN: 13
GFR AFRICAN AMERICAN: 13
GFR NON-AFRICAN AMERICAN: 11 ML/MIN/1.73
GFR NON-AFRICAN AMERICAN: 11 ML/MIN/1.73
GLUCOSE BLD-MCNC: 104 MG/DL (ref 74–99)
GLUCOSE BLD-MCNC: 159 MG/DL (ref 74–99)
HCT VFR BLD CALC: 28.7 % (ref 37–54)
HEMOGLOBIN: 9.2 G/DL (ref 12.5–16.5)
IMMATURE GRANULOCYTES #: 0.16 E9/L
IMMATURE GRANULOCYTES %: 1.8 % (ref 0–5)
LYMPHOCYTES ABSOLUTE: 0.99 E9/L (ref 1.5–4)
LYMPHOCYTES RELATIVE PERCENT: 11.1 % (ref 20–42)
MAGNESIUM: 1.8 MG/DL (ref 1.6–2.6)
MCH RBC QN AUTO: 30.2 PG (ref 26–35)
MCHC RBC AUTO-ENTMCNC: 32.1 % (ref 32–34.5)
MCV RBC AUTO: 94.1 FL (ref 80–99.9)
METER GLUCOSE: 107 MG/DL (ref 74–99)
METER GLUCOSE: 139 MG/DL (ref 74–99)
METER GLUCOSE: 172 MG/DL (ref 74–99)
METER GLUCOSE: 211 MG/DL (ref 74–99)
MONOCYTES ABSOLUTE: 0.95 E9/L (ref 0.1–0.95)
MONOCYTES RELATIVE PERCENT: 10.6 % (ref 2–12)
NEUTROPHILS ABSOLUTE: 6.47 E9/L (ref 1.8–7.3)
NEUTROPHILS RELATIVE PERCENT: 72.5 % (ref 43–80)
PDW BLD-RTO: 15.3 FL (ref 11.5–15)
PHOSPHORUS: 5.5 MG/DL (ref 2.5–4.5)
PLATELET # BLD: 182 E9/L (ref 130–450)
PMV BLD AUTO: 9.7 FL (ref 7–12)
POTASSIUM SERPL-SCNC: 4.2 MMOL/L (ref 3.5–5)
POTASSIUM SERPL-SCNC: 4.5 MMOL/L (ref 3.5–5)
PRO-BNP: 8352 PG/ML (ref 0–125)
RBC # BLD: 3.05 E12/L (ref 3.8–5.8)
SODIUM BLD-SCNC: 131 MMOL/L (ref 132–146)
SODIUM BLD-SCNC: 135 MMOL/L (ref 132–146)
TSH SERPL DL<=0.05 MIU/L-ACNC: 2.95 UIU/ML (ref 0.27–4.2)
WBC # BLD: 8.9 E9/L (ref 4.5–11.5)

## 2022-03-13 PROCEDURE — 6370000000 HC RX 637 (ALT 250 FOR IP): Performed by: INTERNAL MEDICINE

## 2022-03-13 PROCEDURE — 82330 ASSAY OF CALCIUM: CPT

## 2022-03-13 PROCEDURE — 2580000003 HC RX 258: Performed by: INTERNAL MEDICINE

## 2022-03-13 PROCEDURE — 85025 COMPLETE CBC W/AUTO DIFF WBC: CPT

## 2022-03-13 PROCEDURE — 1200000000 HC SEMI PRIVATE

## 2022-03-13 PROCEDURE — 99231 SBSQ HOSP IP/OBS SF/LOW 25: CPT | Performed by: INTERNAL MEDICINE

## 2022-03-13 PROCEDURE — 94660 CPAP INITIATION&MGMT: CPT

## 2022-03-13 PROCEDURE — S5553 INSULIN LONG ACTING 5 U: HCPCS | Performed by: INTERNAL MEDICINE

## 2022-03-13 PROCEDURE — 83880 ASSAY OF NATRIURETIC PEPTIDE: CPT

## 2022-03-13 PROCEDURE — 80048 BASIC METABOLIC PNL TOTAL CA: CPT

## 2022-03-13 PROCEDURE — 84100 ASSAY OF PHOSPHORUS: CPT

## 2022-03-13 PROCEDURE — 83735 ASSAY OF MAGNESIUM: CPT

## 2022-03-13 PROCEDURE — 36415 COLL VENOUS BLD VENIPUNCTURE: CPT

## 2022-03-13 PROCEDURE — 82962 GLUCOSE BLOOD TEST: CPT

## 2022-03-13 PROCEDURE — 84443 ASSAY THYROID STIM HORMONE: CPT

## 2022-03-13 RX ORDER — ANALGESIC BALM 1.74; 4.06 G/29G; G/29G
OINTMENT TOPICAL ONCE
Status: DISCONTINUED | OUTPATIENT
Start: 2022-03-13 | End: 2022-03-16 | Stop reason: HOSPADM

## 2022-03-13 RX ADMIN — INSULIN LISPRO 1 UNITS: 100 INJECTION, SOLUTION INTRAVENOUS; SUBCUTANEOUS at 21:12

## 2022-03-13 RX ADMIN — INSULIN GLARGINE-YFGN 15 UNITS: 100 INJECTION, SOLUTION SUBCUTANEOUS at 05:54

## 2022-03-13 RX ADMIN — APIXABAN 5 MG: 5 TABLET, FILM COATED ORAL at 20:02

## 2022-03-13 RX ADMIN — Medication 10 ML: at 20:02

## 2022-03-13 RX ADMIN — ATORVASTATIN CALCIUM 40 MG: 40 TABLET, FILM COATED ORAL at 08:13

## 2022-03-13 RX ADMIN — Medication 10 ML: at 08:16

## 2022-03-13 RX ADMIN — INSULIN LISPRO 1 UNITS: 100 INJECTION, SOLUTION INTRAVENOUS; SUBCUTANEOUS at 08:13

## 2022-03-13 RX ADMIN — ACETAMINOPHEN 650 MG: 325 TABLET ORAL at 21:13

## 2022-03-13 RX ADMIN — APIXABAN 5 MG: 5 TABLET, FILM COATED ORAL at 08:13

## 2022-03-13 RX ADMIN — LEVOTHYROXINE SODIUM 88 MCG: 0.09 TABLET ORAL at 08:16

## 2022-03-13 ASSESSMENT — PAIN SCALES - GENERAL
PAINLEVEL_OUTOF10: 0
PAINLEVEL_OUTOF10: 1
PAINLEVEL_OUTOF10: 1
PAINLEVEL_OUTOF10: 4

## 2022-03-13 NOTE — PROGRESS NOTES
[facility]  Internal Medicine Residency Program  Progress Note - House Team     Patient:  Tressia Fleischer 74 y.o. male MRN: [id number]     Date of Service: 3/13/2022     CC: urinary retention   Overnight events: no acute event reported     Subjective     Patient was seen and examined this morning at bedside in no acute distress. He was sleepy during the round. Pt wears CPAP at home with 4 lit O4. He is still anuric. Overnight no acute event reported. Objective     Physical Exam:  Vitals: BP (!) 116/57   Pulse 65   Temp 97.6 °F (36.4 °C) (Temporal)   Resp 18   Ht 5' 7\" (1.702 m)   Wt 246 lb 4.1 oz (111.7 kg)   SpO2 93%   BMI 38.57 kg/m²     I & O - 24hr: No intake/output data recorded. General Appearance: alert, appears stated age and cooperative  HEENT:  Head: Normal, normocephalic, atraumatic. Neck: no adenopathy, no carotid bruit, no JVD, supple, symmetrical, trachea midline and thyroid not enlarged, symmetric, no tenderness/mass/nodules  Lung: clear to auscultation bilaterally  Heart: regular rate and rhythm, S1, S2 normal, no murmur, click, rub or gallop and S4 present  Abdomen: distended abdomen, no tenderness, no organomegaly   Extremities:  bilateral LE edema up to the thights   Musculokeletal: No joint swelling, no muscle tenderness. ROM normal in all joints of extremities.    Neurologic: Mental status: Alert, oriented, thought content appropriate  Subject  Pertinent Labs & Imaging Studies   carmela  CBC:   Lab Results   Component Value Date    WBC 8.9 03/13/2022    RBC 3.05 03/13/2022    HGB 9.2 03/13/2022    HCT 28.7 03/13/2022    MCV 94.1 03/13/2022    MCH 30.2 03/13/2022    MCHC 32.1 03/13/2022    RDW 15.3 03/13/2022     03/13/2022    MPV 9.7 03/13/2022     CBC with Differential:    Lab Results   Component Value Date    WBC 8.9 03/13/2022    RBC 3.05 03/13/2022    HGB 9.2 03/13/2022    HCT 28.7 03/13/2022     03/13/2022    MCV 94.1 03/13/2022    MCH 30.2 03/13/2022    MCHC 32.1 03/13/2022    RDW 15.3 03/13/2022    SEGSPCT 68 04/27/2011    LYMPHOPCT 11.1 03/13/2022    MONOPCT 10.6 03/13/2022    EOSPCT 7 10/05/2010    BASOPCT 0.8 03/13/2022    MONOSABS 0.95 03/13/2022    LYMPHSABS 0.99 03/13/2022    EOSABS 0.29 03/13/2022    BASOSABS 0.07 03/13/2022     WBC:    Lab Results   Component Value Date    WBC 8.9 03/13/2022     Platelets:    Lab Results   Component Value Date     03/13/2022     Hemoglobin/Hematocrit:    Lab Results   Component Value Date    HGB 9.2 03/13/2022    HCT 28.7 03/13/2022     CMP:    Lab Results   Component Value Date     03/13/2022    K 4.5 03/13/2022    K 5.0 03/10/2022    CL 96 03/13/2022    CO2 22 03/13/2022    BUN 38 03/13/2022    CREATININE 5.2 03/13/2022    GFRAA 13 03/13/2022    LABGLOM 11 03/13/2022    GLUCOSE 159 03/13/2022    GLUCOSE 133 04/18/2012    PROT 7.6 03/10/2022    LABALBU 4.0 03/10/2022    LABALBU 4.4 10/25/2011    CALCIUM 8.6 03/13/2022    BILITOT 0.5 03/10/2022    ALKPHOS 89 03/10/2022    AST 15 03/10/2022    ALT 22 03/10/2022     BMP:    Lab Results   Component Value Date     03/13/2022    K 4.5 03/13/2022    K 5.0 03/10/2022    CL 96 03/13/2022    CO2 22 03/13/2022    BUN 38 03/13/2022    LABALBU 4.0 03/10/2022    LABALBU 4.4 10/25/2011    CREATININE 5.2 03/13/2022    CALCIUM 8.6 03/13/2022    GFRAA 13 03/13/2022    LABGLOM 11 03/13/2022    GLUCOSE 159 03/13/2022    GLUCOSE 133 04/18/2012     Sodium:    Lab Results   Component Value Date     03/13/2022     Potassium:    Lab Results   Component Value Date    K 4.5 03/13/2022    K 5.0 03/10/2022     BUN/Creatinine:    Lab Results   Component Value Date    BUN 38 03/13/2022    CREATININE 5.2 03/13/2022     Hepatic Function Panel:    Lab Results   Component Value Date    ALKPHOS 89 03/10/2022    ALT 22 03/10/2022    AST 15 03/10/2022    PROT 7.6 03/10/2022    BILITOT 0.5 03/10/2022    BILIDIR <0.2 05/25/2017    IBILI see below 05/25/2017    LABALBU 4.0 03/10/2022    LABALBU 4.4 10/25/2011     Albumin:    Lab Results   Component Value Date    LABALBU 4.0 03/10/2022    LABALBU 4.4 10/25/2011     Calcium:    Lab Results   Component Value Date    CALCIUM 8.6 03/13/2022     Ionized Calcium:  No results found for: IONCA  Magnesium:    Lab Results   Component Value Date    MG 1.8 03/13/2022     Phosphorus:    Lab Results   Component Value Date    PHOS 5.5 03/13/2022     LDH:  No results found for: LDH  Uric Acid:  No results found for: LABURIC, URICACID  PT/INR:    Lab Results   Component Value Date    PROTIME 14.8 03/11/2022    INR 1.3 03/11/2022     Warfarin PT/INR:  No components found for: PTPATWAR, PTINRWAR  PTT:    Lab Results   Component Value Date    APTT 34.4 03/11/2022   [APTT}  Troponin:    Lab Results   Component Value Date    TROPONINI 0.05 05/26/2017     Last 3 Troponin:    Lab Results   Component Value Date    TROPONINI 0.05 05/26/2017    TROPONINI 0.04 05/25/2017    TROPONINI 0.05 05/25/2017       XR CHEST PORTABLE   Final Result   1. Stable cardiomegaly with pulmonary vascular congestion. 2. Stable opacities in left lung base could indicate subsegmental atelectasis   or small left pleural effusion. US RETROPERITONEAL COMPLETE   Final Result   1. Diffuse, bilateral renal cortical thinning. Resident's Assessment and Plan     Assessment and Plan:    Urinary retention secondary to RADHA stage III most likely ischemic ATN in the setting of prolonged diarrhea   Temporary HD cath placed 2 days ago  Patient received 3 hours dialysis yesterday   Continue IV fluid normal saline at 125 cc an hour   Creatinine was 4.6 on admission peaking at 5.7.   Baseline 1.1-1.3   Continue to monitor creatinine   Strict I's and O's   Recent Labs     03/12/22  0553 03/12/22  1845 03/13/22  0550   CO2 20* 21* 22   BUN 54* 36* 38*   CREATININE 5.5* 4.5* 5.2*     U/O:  0.1 ml/kg/hr    CKD stage II without proteinuria   Ultrasound showing diffuse bilateral renal cortical thinning   Strict I's and O's     HAGMA secondary to uremia   On hemodialysis per nephrology     Hyperkalemia secondary to RADHA-resolved   Started on Lokelma   Continue to monitor potassium    Hypomag secondary to diarrhea   Monitor and replace as needed     Hypertension   On metoprolol and amlodipine. Hold lisinopril     History of vitamin D deficiency     History of HFpEF   proBNP 4751     History of A.  Fib   ontinue Eliquis     History of type 2 diabetes   On Sliding scale   Hypoglycemia protocol  BS Q6 hr    History of hypothyroidism   Continue Synthroid     History of hyperlipidemia   Continue statin           PT/OT evaluation:      DVT prophylaxis/ GI prophylaxis: eliquis/diet     Disposition: continue current care     Chauncey Bravo MD, PGY-1  Attending physician: Dr. Bony Dawson

## 2022-03-13 NOTE — PROGRESS NOTES
Department of Internal Medicine  Nephrology Progress Note      Events reviewed. Urine output slightly better    SUBJECTIVE:  We are following Mr. Rocha for RADHA on CKD. He reports no complaints. No SOB. Wife at the bedside at the time of my examination. PHYSICAL EXAM:      Vitals:    VITALS:  BP 97/65   Pulse 60   Temp 97.4 °F (36.3 °C) (Temporal)   Resp 18   Ht 5' 7\" (1.702 m)   Wt 246 lb 4.1 oz (111.7 kg)   SpO2 92%   BMI 38.57 kg/m²   24HR INTAKE/OUTPUT:      Intake/Output Summary (Last 24 hours) at 3/13/2022 1135  Last data filed at 3/13/2022 0556  Gross per 24 hour   Intake --   Output 250 ml   Net -250 ml     Access: Right temporary dialysis catheter  Constitutional:  Alert, elderly, well developed, no acute distress  HEENT:  PERRLA, normocephalic, atraumtic  Respiratory:  CTAB  Cardiovascular/Edema:  S1/S2, RRR  Gastrointestinal:  Soft, rounded, non-tender  Neurologic:  AxOx3  Skin:  Warm, dry, dry mucous membranes   Other:  Prince- minimal urine noted, no edema  Scheduled Meds:   insulin glargine  15 Units SubCUTAneous QAM AC    [Held by provider] amLODIPine  5 mg Oral Daily    atorvastatin  40 mg Oral Daily    [Held by provider] bumetanide  2 mg Oral Daily    apixaban  5 mg Oral BID    levothyroxine  88 mcg Oral Daily    [Held by provider] lisinopril  40 mg Oral Daily    metoprolol succinate  25 mg Oral Daily    sodium chloride flush  5-40 mL IntraVENous 2 times per day    insulin lispro  0-6 Units SubCUTAneous TID WC    insulin lispro  0-3 Units SubCUTAneous Nightly     Continuous Infusions:   sodium chloride      dextrose       PRN Meds:.sodium chloride flush, sodium chloride, ondansetron **OR** ondansetron, acetaminophen **OR** acetaminophen, polyethylene glycol, glucose, dextrose, glucagon (rDNA), dextrose, ipratropium-albuterol    DATA:    CBC:   Lab Results   Component Value Date    WBC 8.9 03/13/2022    RBC 3.05 03/13/2022    HGB 9.2 03/13/2022    HCT 28.7 03/13/2022    MCV 94.1 today    IMPRESSION/RECOMMENDATIONS:      1. RADHA Stage III (anuric), ischemic ATN in the setting of prolonged diarrhea with diuretic and ACE inhibition administration. On admission, creatinine 4.6mg/dL, peaking at 5.8 mg/dL. Patient remains anuric despite fluid resuscitation. Right femoral temporary dialysis catheter placed 3/10 by Dr. Marc Helm followed by HD X 2hrs. Patient remains anuric. For dialysis tomorrow. 2. Hyponatremia, 2/2 decreased free water excretion from decreased GFR (RADHA) . Improving. 3. CKD Stage 2 without proteinuria, as evidence by renal US demonstrating diffuse, bilateral renal cortical thinning. Baseline creatinine 1.1-1.3mg/dL. 4. HAGAMA 2/2 uremia,  started on RRT 3/10 X2 hrs, for HD X3 hrs today. 5. Hypomagnesia 2/2 GI losses, to replace   6. HTN, on metoprolol and amlodipine  7. H/o vitamin d deficiency, on cholecalciferol  8. EFpEF 55-60%, proBNP 4,751  9. MBD of CKD, not on binders, continue to monitor phos levels, may need to initiate phosphorus binders  ------------------------------------------------------------------------------------------------------  10. Anemia of CKD, check iron studies  11. Atrial fibrillation, on apixaban  12. DM type II, on insulin  13. Hypothyroidism, on levothyroxine   14. Hyperlipidemia, on statin  15. Nutrition: low potassium, 1L fluid restriction     Plan:    · For HD tomorrow  · Continue to monitor renal function for recovery  · Dr. Hamida Langston discussed option of PD catheter placement and home peritoneal dialysis. I spoke with both patient and wife today at the bedside and they are unsure at the moment how they want to proceed.   · Continue to hold lisinopril  · Continue to monitor potassium level  · Continue to monitor magnesium level  · Monitor sodium levels  · Strict I's and O's  · Obtain phosphorus level in am  · Obtain iron studies, Vitamin b12 and folate    Electronically signed by JACQUELINE Mas CNP on 3/13/2022 at 11:35 AM Discussed with family at bedside  Agree as annotated  Shailesh Flores MD

## 2022-03-14 LAB
ANION GAP SERPL CALCULATED.3IONS-SCNC: 14 MMOL/L (ref 7–16)
ANION GAP SERPL CALCULATED.3IONS-SCNC: 16 MMOL/L (ref 7–16)
BASOPHILS ABSOLUTE: 0.07 E9/L (ref 0–0.2)
BASOPHILS RELATIVE PERCENT: 1.1 % (ref 0–2)
BUN BLDV-MCNC: 41 MG/DL (ref 6–23)
BUN BLDV-MCNC: 45 MG/DL (ref 6–23)
CALCIUM IONIZED: 1.16 MMOL/L (ref 1.15–1.33)
CALCIUM SERPL-MCNC: 8.5 MG/DL (ref 8.6–10.2)
CALCIUM SERPL-MCNC: 8.9 MG/DL (ref 8.6–10.2)
CHLORIDE BLD-SCNC: 97 MMOL/L (ref 98–107)
CHLORIDE BLD-SCNC: 97 MMOL/L (ref 98–107)
CO2: 24 MMOL/L (ref 22–29)
CO2: 24 MMOL/L (ref 22–29)
CREAT SERPL-MCNC: 4.4 MG/DL (ref 0.7–1.2)
CREAT SERPL-MCNC: 5 MG/DL (ref 0.7–1.2)
EOSINOPHILS ABSOLUTE: 0.28 E9/L (ref 0.05–0.5)
EOSINOPHILS RELATIVE PERCENT: 4.2 % (ref 0–6)
FERRITIN: 124 NG/ML
FOLATE: 13.7 NG/ML (ref 4.8–24.2)
GFR AFRICAN AMERICAN: 14
GFR AFRICAN AMERICAN: 16
GFR NON-AFRICAN AMERICAN: 12 ML/MIN/1.73
GFR NON-AFRICAN AMERICAN: 13 ML/MIN/1.73
GLUCOSE BLD-MCNC: 144 MG/DL (ref 74–99)
GLUCOSE BLD-MCNC: 205 MG/DL (ref 74–99)
HAV IGM SER IA-ACNC: NORMAL
HBV SURFACE AB TITR SER: NORMAL {TITER}
HCT VFR BLD CALC: 27.4 % (ref 37–54)
HEMOGLOBIN: 8.6 G/DL (ref 12.5–16.5)
HEPATITIS B CORE IGM ANTIBODY: NORMAL
HEPATITIS B SURFACE ANTIGEN INTERPRETATION: NORMAL
HEPATITIS C ANTIBODY INTERPRETATION: NORMAL
IMMATURE GRANULOCYTES #: 0.08 E9/L
IMMATURE GRANULOCYTES %: 1.2 % (ref 0–5)
IRON SATURATION: 18 % (ref 20–55)
IRON: 54 MCG/DL (ref 59–158)
LYMPHOCYTES ABSOLUTE: 1.15 E9/L (ref 1.5–4)
LYMPHOCYTES RELATIVE PERCENT: 17.4 % (ref 20–42)
MAGNESIUM: 2 MG/DL (ref 1.6–2.6)
MCH RBC QN AUTO: 29.8 PG (ref 26–35)
MCHC RBC AUTO-ENTMCNC: 31.4 % (ref 32–34.5)
MCV RBC AUTO: 94.8 FL (ref 80–99.9)
METER GLUCOSE: 139 MG/DL (ref 74–99)
METER GLUCOSE: 191 MG/DL (ref 74–99)
METER GLUCOSE: 197 MG/DL (ref 74–99)
METER GLUCOSE: 198 MG/DL (ref 74–99)
MONOCYTES ABSOLUTE: 0.72 E9/L (ref 0.1–0.95)
MONOCYTES RELATIVE PERCENT: 10.9 % (ref 2–12)
NEUTROPHILS ABSOLUTE: 4.31 E9/L (ref 1.8–7.3)
NEUTROPHILS RELATIVE PERCENT: 65.2 % (ref 43–80)
PDW BLD-RTO: 15.3 FL (ref 11.5–15)
PHOSPHORUS: 6.2 MG/DL (ref 2.5–4.5)
PLATELET # BLD: 182 E9/L (ref 130–450)
PMV BLD AUTO: 9.6 FL (ref 7–12)
POTASSIUM SERPL-SCNC: 4.3 MMOL/L (ref 3.5–5)
POTASSIUM SERPL-SCNC: 4.8 MMOL/L (ref 3.5–5)
PRO-BNP: 7162 PG/ML (ref 0–125)
RBC # BLD: 2.89 E12/L (ref 3.8–5.8)
SODIUM BLD-SCNC: 135 MMOL/L (ref 132–146)
SODIUM BLD-SCNC: 137 MMOL/L (ref 132–146)
TOTAL IRON BINDING CAPACITY: 304 MCG/DL (ref 250–450)
VITAMIN B-12: 339 PG/ML (ref 211–946)
WBC # BLD: 6.6 E9/L (ref 4.5–11.5)

## 2022-03-14 PROCEDURE — 82962 GLUCOSE BLOOD TEST: CPT

## 2022-03-14 PROCEDURE — 82728 ASSAY OF FERRITIN: CPT

## 2022-03-14 PROCEDURE — 82607 VITAMIN B-12: CPT

## 2022-03-14 PROCEDURE — S5553 INSULIN LONG ACTING 5 U: HCPCS | Performed by: INTERNAL MEDICINE

## 2022-03-14 PROCEDURE — 2580000003 HC RX 258: Performed by: INTERNAL MEDICINE

## 2022-03-14 PROCEDURE — 82330 ASSAY OF CALCIUM: CPT

## 2022-03-14 PROCEDURE — 36415 COLL VENOUS BLD VENIPUNCTURE: CPT

## 2022-03-14 PROCEDURE — 83550 IRON BINDING TEST: CPT

## 2022-03-14 PROCEDURE — 85025 COMPLETE CBC W/AUTO DIFF WBC: CPT

## 2022-03-14 PROCEDURE — 84100 ASSAY OF PHOSPHORUS: CPT

## 2022-03-14 PROCEDURE — 99232 SBSQ HOSP IP/OBS MODERATE 35: CPT

## 2022-03-14 PROCEDURE — 83735 ASSAY OF MAGNESIUM: CPT

## 2022-03-14 PROCEDURE — 6370000000 HC RX 637 (ALT 250 FOR IP): Performed by: INTERNAL MEDICINE

## 2022-03-14 PROCEDURE — 2700000000 HC OXYGEN THERAPY PER DAY

## 2022-03-14 PROCEDURE — 1200000000 HC SEMI PRIVATE

## 2022-03-14 PROCEDURE — 80048 BASIC METABOLIC PNL TOTAL CA: CPT

## 2022-03-14 PROCEDURE — 94660 CPAP INITIATION&MGMT: CPT

## 2022-03-14 PROCEDURE — 99231 SBSQ HOSP IP/OBS SF/LOW 25: CPT | Performed by: INTERNAL MEDICINE

## 2022-03-14 PROCEDURE — 83540 ASSAY OF IRON: CPT

## 2022-03-14 PROCEDURE — 83880 ASSAY OF NATRIURETIC PEPTIDE: CPT

## 2022-03-14 PROCEDURE — 82746 ASSAY OF FOLIC ACID SERUM: CPT

## 2022-03-14 RX ADMIN — APIXABAN 5 MG: 5 TABLET, FILM COATED ORAL at 21:38

## 2022-03-14 RX ADMIN — APIXABAN 5 MG: 5 TABLET, FILM COATED ORAL at 07:42

## 2022-03-14 RX ADMIN — INSULIN LISPRO 1 UNITS: 100 INJECTION, SOLUTION INTRAVENOUS; SUBCUTANEOUS at 17:06

## 2022-03-14 RX ADMIN — INSULIN LISPRO 1 UNITS: 100 INJECTION, SOLUTION INTRAVENOUS; SUBCUTANEOUS at 11:48

## 2022-03-14 RX ADMIN — INSULIN GLARGINE-YFGN 15 UNITS: 100 INJECTION, SOLUTION SUBCUTANEOUS at 06:17

## 2022-03-14 RX ADMIN — ATORVASTATIN CALCIUM 40 MG: 40 TABLET, FILM COATED ORAL at 07:42

## 2022-03-14 RX ADMIN — LEVOTHYROXINE SODIUM 88 MCG: 0.09 TABLET ORAL at 06:17

## 2022-03-14 RX ADMIN — INSULIN LISPRO 1 UNITS: 100 INJECTION, SOLUTION INTRAVENOUS; SUBCUTANEOUS at 22:06

## 2022-03-14 RX ADMIN — Medication 10 ML: at 21:39

## 2022-03-14 RX ADMIN — Medication 10 ML: at 07:48

## 2022-03-14 ASSESSMENT — PAIN SCALES - GENERAL
PAINLEVEL_OUTOF10: 0
PAINLEVEL_OUTOF10: 0

## 2022-03-14 NOTE — PROGRESS NOTES
[address]   Internal Medicine Residency / [address]    Attending Physician Statement  I have discussed the case, including pertinent history and exam findings with the resident and the team.  I have seen and examined the patient and the key elements of the encounter have been performed by me. I agree with the assessment, plan and orders as documented by the resident. Longstanding DM 2 and obesity  Hx of DM2 retinopathy  Had been on long term Lisinopril  No obstructive Uroopathy with oliguria  Lives in [place] and may have become volume depleted from diarrhea  BP this AM 90's responsive to caffeine  Will have dialysis today for acute renal failure  U/A reviewed and no casts or tubular cells and SG 1.025  Impression; Acute on Chronic dario;failure  Plan: Cautious hydration and dialysis            Follow with Nephro  Remainder of medical problems as per resident note.       Shorty Canales MD [facility] SPECIALTY [facility]  Internal Medicine Residency Faculty

## 2022-03-14 NOTE — PROGRESS NOTES
[facility]  Internal [facility] Residency Program  Progress Note - House Team     Patient:  Martine Carroll 74 y.o. male MRN: [id number]     Date of Service: 3/14/2022     CC: Urinary retention    Subjective   Brief Summary:  Presented to ED yesterday stating that he last urinated on 3/9 at 0200. He reported drinking plenty of fluids. He denied history of urinary retention in the past. He also denied any feelings of bladder fullness at the time. He did not have any LE or sacral edema at the time of presentation. Per chart review, he received 4 days of dialysis in 2017 for ATN in setting of rhabdomyolysis and contrast nephropathy. Creatinine returned to baseline and resolved since then. He reported no recent contrast imaging. In ED he was noted to have increased creatinine of 4.6 from baseline of 1.1 and BUN of 63 from 30. Nephrology has been consulted and is following. Hospital Day: 5    Overnight Events:   Asked for & received icy hot for knee    This AM   Patient was seen and examined this morning at bedside   No complaints this morning   Dialysis scheduled for 1300 this afternoon    Objective     Physical Exam:  · Vitals: BP (!) 103/46   Pulse 58   Temp 98 °F (36.7 °C) (Temporal)   Resp 18   Ht 5' 7\" (1.702 m)   Wt 246 lb 4.1 oz (111.7 kg)   SpO2 96%   BMI 38.57 kg/m²     · I & O - 24hr: No intake/output data recorded. · General Appearance: alert, appears stated age and cooperative  · HEENT:  Head: Normal, normocephalic, atraumatic. · Neck: no JVD  · Lung: clear to auscultation bilaterally  · Heart: regular rate and rhythm and S1, S2 normal  · Abdomen: soft, non-tender; bowel sounds normal; no masses,  no organomegaly  · Extremities:  extremities normal, atraumatic, no cyanosis or edema  · Musculokeletal: No joint swelling, no muscle tenderness. ROM normal in all joints of extremities.    · Neurologic: Mental status: Alert, oriented, thought content appropriate  Subject  Pertinent Labs & Imaging Studies   carmela  CBC:   Lab Results   Component Value Date    WBC 6.6 03/14/2022    RBC 2.89 03/14/2022    HGB 8.6 03/14/2022    HCT 27.4 03/14/2022    MCV 94.8 03/14/2022    MCH 29.8 03/14/2022    MCHC 31.4 03/14/2022    RDW 15.3 03/14/2022     03/14/2022    MPV 9.6 03/14/2022     CBC with Differential:    Lab Results   Component Value Date    WBC 6.6 03/14/2022    RBC 2.89 03/14/2022    HGB 8.6 03/14/2022    HCT 27.4 03/14/2022     03/14/2022    MCV 94.8 03/14/2022    MCH 29.8 03/14/2022    MCHC 31.4 03/14/2022    RDW 15.3 03/14/2022    SEGSPCT 68 04/27/2011    LYMPHOPCT 17.4 03/14/2022    MONOPCT 10.9 03/14/2022    EOSPCT 7 10/05/2010    BASOPCT 1.1 03/14/2022    MONOSABS 0.72 03/14/2022    LYMPHSABS 1.15 03/14/2022    EOSABS 0.28 03/14/2022    BASOSABS 0.07 03/14/2022     WBC:    Lab Results   Component Value Date    WBC 6.6 03/14/2022     Platelets:    Lab Results   Component Value Date     03/14/2022     Hemoglobin/Hematocrit:    Lab Results   Component Value Date    HGB 8.6 03/14/2022    HCT 27.4 03/14/2022     CMP:    Lab Results   Component Value Date     03/14/2022    K 4.8 03/14/2022    K 5.0 03/10/2022    CL 97 03/14/2022    CO2 24 03/14/2022    BUN 45 03/14/2022    CREATININE 5.0 03/14/2022    GFRAA 14 03/14/2022    LABGLOM 12 03/14/2022    GLUCOSE 144 03/14/2022    GLUCOSE 133 04/18/2012    PROT 7.6 03/10/2022    LABALBU 4.0 03/10/2022    LABALBU 4.4 10/25/2011    CALCIUM 8.5 03/14/2022    BILITOT 0.5 03/10/2022    ALKPHOS 89 03/10/2022    AST 15 03/10/2022    ALT 22 03/10/2022     BMP:    Lab Results   Component Value Date     03/14/2022    K 4.8 03/14/2022    K 5.0 03/10/2022    CL 97 03/14/2022    CO2 24 03/14/2022    BUN 45 03/14/2022    LABALBU 4.0 03/10/2022    LABALBU 4.4 10/25/2011    CREATININE 5.0 03/14/2022    CALCIUM 8.5 03/14/2022    GFRAA 14 03/14/2022    LABGLOM 12 03/14/2022    GLUCOSE 144 03/14/2022    GLUCOSE 133 04/18/2012     Sodium: Lab Results   Component Value Date     03/14/2022     Potassium:    Lab Results   Component Value Date    K 4.8 03/14/2022    K 5.0 03/10/2022     BUN/Creatinine:    Lab Results   Component Value Date    BUN 45 03/14/2022    CREATININE 5.0 03/14/2022     Hepatic Function Panel:    Lab Results   Component Value Date    ALKPHOS 89 03/10/2022    ALT 22 03/10/2022    AST 15 03/10/2022    PROT 7.6 03/10/2022    BILITOT 0.5 03/10/2022    BILIDIR <0.2 05/25/2017    IBILI see below 05/25/2017    LABALBU 4.0 03/10/2022    LABALBU 4.4 10/25/2011     Albumin:    Lab Results   Component Value Date    LABALBU 4.0 03/10/2022    LABALBU 4.4 10/25/2011     Calcium:    Lab Results   Component Value Date    CALCIUM 8.5 03/14/2022     Magnesium:    Lab Results   Component Value Date    MG 2.0 03/14/2022     Phosphorus:    Lab Results   Component Value Date    PHOS 6.2 03/14/2022     PT/INR:    Lab Results   Component Value Date    PROTIME 14.8 03/11/2022    INR 1.3 03/11/2022     Warfarin PT/INR:  No components found for: PTPATWAR, PTINRWAR  PTT:    Lab Results   Component Value Date    APTT 34.4 03/11/2022     U/A:    Lab Results   Component Value Date    COLORU Yellow 03/10/2022    PROTEINU Negative 03/10/2022    PHUR 5.0 03/10/2022    LABCAST FEW 05/25/2017    WBCUA 1-3 03/10/2022    RBCUA 1-3 03/10/2022    BACTERIA FEW 03/10/2022    CLARITYU Clear 03/10/2022    SPECGRAV 1.025 03/10/2022    LEUKOCYTESUR Negative 03/10/2022    UROBILINOGEN 0.2 03/10/2022    BILIRUBINUR Negative 03/10/2022    BLOODU Negative 03/10/2022    GLUCOSEU Negative 03/10/2022     HgBA1c:    Lab Results   Component Value Date    LABA1C 7.0 03/11/2022     TSH:    Lab Results   Component Value Date    TSH 2.950 03/13/2022     VITAMIN B12: No components found for: B12  FOLATE:    Lab Results   Component Value Date    FOLATE 13.7 03/14/2022     FERRITIN:    Lab Results   Component Value Date    FERRITIN 124 03/14/2022       XR CHEST PORTABLE   Final Result 1. Stable cardiomegaly with pulmonary vascular congestion. 2. Stable opacities in left lung base could indicate subsegmental atelectasis   or small left pleural effusion. US RETROPERITONEAL COMPLETE   Final Result   1. Diffuse, bilateral renal cortical thinning. Resident's Assessment and Plan     Assessment and Plan:    1. Neuro/Psych  a. No acute issues  2. HEENT  a. Hx of hypothyroidism  i. Coninue Synthroid 88 mcg  3. Respiratory  a. NIKO  i. BiPAP at night  b. Restrictive lung disease 2/2 obesity  i. On O2 nasal cannula 3 L  4. Cardio  a. Hx of A-fib  i. Continue Eliquis 5 mg BID, metoprolol succinate 35 mg  b. Hx of HLD  i. Continue Lipitor 40 mg  c. Hx of HTN  i. On amlodipine 5 mg, Bumex 2 mg, lisinopril 40 mg  ii. 3/14: held BP meds due to low BP  d. Hx of T2DM  i. Continue insulin glargine & lispro sliding scale  e. Hx of HFpEF  i. ProBNP on admission 4751 on admission, repeat 8352  ii. ProBNP today 3/14: 7162  5. GI  a. Hypomagnesemia 2/2 diarrhea  i. Replace Mg as needed  ii. Mg 2.0  6.   a. RADHA stage III, likely ischemic ATN 2/2 prolonged diarrhea & dehydration  i. Temporary HD catheter placed  ii. HD scheduled for today 3/14  iii. Continue IV  cc/hr  iv. Creatinine elevated to 4.6 on admission, peaking at 5.7 (vs. Baseline 1.1-1.3)  v. Creatinine this AM 5  vi. Continue creatinine monitoring  vii. Monitor I&Os  b. CKD stage II w/o proteinuria  i. 3/11 retroperitoneal US -- diffuse bilateral renal cortical thinning  c. HAGMA 2/2 uremia  i. Nephrology following  ii. HD  d. Hyperkalemia 2/2 RADHA  i. Resolved  ii. Started on Lokelma  iii. K 4.8 today  7. MSK  a.  No acute issues      Diet: Regular diet  PT/OT evaluation: --  DVT prophylaxis/ GI prophylaxis: Eliquis/diet  Disposition: continue current care     Lory Sutherland OMS-III  Attending physician: Dr. Bianca Mejia

## 2022-03-14 NOTE — PROGRESS NOTES
[facility]  Internal Medicine Residency Program  Progress Note - House Team     Patient:  George Pop 74 y.o. male MRN: [id number]     Date of Service: 3/14/2022     CC: Urinary retention  Overnight events: Icy hot for knee    Subjective     Patient was seen and examined this morning at bedside in no acute distress. He is sitting comfortably at bedside. He has no other complaints. For dialysis today. Objective     Physical Exam:  TEMPERATURE:  Current - Temp: 98 °F (36.7 °C); Max - Temp  Av °F (36.7 °C)  Min: 98 °F (36.7 °C)  Max: 98 °F (36.7 °C)  RESPIRATIONS RANGE: Resp  Av  Min: 18  Max: 18  PULSE RANGE: Pulse  Av.5  Min: 58  Max: 63  BLOOD PRESSURE RANGE:  Systolic (77KNO), FCJ:029 , Min:95 , GVJ:176   ; Diastolic (02JIO), OFC:21, Min:43, Max:51    PULSE OXIMETRY RANGE: SpO2  Av %  Min: 94 %  Max: 96 %    I & O - 24hr:    Intake/Output Summary (Last 24 hours) at 3/14/2022 1139  Last data filed at 3/14/2022 0656  Gross per 24 hour   Intake --   Output 2150 ml   Net -2150 ml     I/O last 3 completed shifts:  In: -   Out: 2400 [Urine:2400] No intake/output data recorded. Weight change:     Physical Exam  Constitutional:       General: He is not in acute distress. Appearance: He is well-developed. He is not diaphoretic. HENT:      Head: Normocephalic and atraumatic. Eyes:      General: No scleral icterus. Pupils: Pupils are equal, round, and reactive to light. Neck:      Thyroid: No thyromegaly. Vascular: No JVD. Trachea: No tracheal deviation. Cardiovascular:      Rate and Rhythm: Normal rate and regular rhythm. Heart sounds: Normal heart sounds. No murmur heard. No friction rub. No gallop. Pulmonary:      Effort: Pulmonary effort is normal. No respiratory distress. Breath sounds: Normal breath sounds. No wheezing or rales. Abdominal:      General: Bowel sounds are normal. There is no distension.       Palpations: Abdomen is soft. There is no mass. Tenderness: There is no abdominal tenderness. There is no guarding or rebound. Genitourinary:     Comments: Prince in place  Musculoskeletal:         General: Normal range of motion. Skin:     General: Skin is warm and dry. Capillary Refill: Capillary refill takes less than 2 seconds. Coloration: Skin is not pale. Findings: No rash. Comments: Temp dialysis line noted   Neurological:      Mental Status: He is alert and oriented to person, place, and time. Cranial Nerves: No cranial nerve deficit. Psychiatric:         Behavior: Behavior normal.         Thought Content: Thought content normal.         Judgment: Judgment normal.       Subject  Pertinent Labs & Imaging Studies   carmela  CBC:   Recent Labs     03/12/22  0553 03/13/22  0550 03/14/22  0545   WBC 9.5 8.9 6.6   HGB 10.5* 9.2* 8.6*   HCT 33.3* 28.7* 27.4*   MCV 94.6 94.1 94.8    182 182       BMP:    Recent Labs     03/13/22  0550 03/13/22  1736 03/14/22  0545    131* 137   K 4.5 4.2 4.8   CL 96* 94* 97*   CO2 22 26 24   BUN 38* 41* 45*   CREATININE 5.2* 5.3* 5.0*   GLUCOSE 159* 104* 144*       LIVER PROFILE:   No results for input(s): AST, ALT, LIPASE, BILIDIR, BILITOT, ALKPHOS in the last 72 hours. Invalid input(s):   AMYLASE,  ALB    PT/INR:   Recent Labs     03/11/22  1427   PROTIME 14.8*   INR 1.3       APTT:   Recent Labs     03/11/22  1427   APTT 34.4       Fasting Lipid Panel:    Lab Results   Component Value Date    CHOL 120 06/14/2021    TRIG 165 06/14/2021    HDL 24 06/14/2021       Notable Cultures:      Blood cultures   Blood Culture, Routine   Date Value Ref Range Status   09/17/2021 5 Days no growth  Final     Respiratory cultures No results found for: RESPCULTURE   Gram Stain Result   Date Value Ref Range Status   05/29/2017   Final    Gram stain performed on unspun fluidPolymorphonuclear leukocytes not seenEpithelial cells not seenRare Erythrocytes     Urine   Urine Culture, Routine   Date Value Ref Range Status   05/25/2017 Growth not present  Final     Legionella No results found for: LABLEGI  C Diff PCR No results found for: CDIFPCR  Wound culture/abscess: No results for input(s): WNDABS in the last 72 hours. Tip culture:No results for input(s): CXCATHTIP in the last 72 hours. US RETROPERITONEAL COMPLETE    Result Date: 3/11/2022  EXAMINATION: RETROPERITONEAL ULTRASOUND OF THE KIDNEYS AND URINARY BLADDER 3/11/2022 COMPARISON: None HISTORY: ORDERING SYSTEM PROVIDED HISTORY: r/o obstruction TECHNOLOGIST PROVIDED HISTORY: Reason for exam:->r/o obstruction What reading provider will be dictating this exam?->CRC FINDINGS: Kidneys: The right kidney measures 11.0 cm in length and the left kidney measures 12.1 cm in length. Kidneys demonstrate normal cortical echogenicity. No evidence of hydronephrosis or intrarenal stones. No renal mass is seen. There is diffuse, bilateral renal cortical thinning. No perinephric fluid is seen bilaterally. Bladder: The urinary bladder is collapsed and there is reportedly a Prince catheter present. 1.  Diffuse, bilateral renal cortical thinning. Resident's Assessment and Plan     Assessment and Plan:    Urinary retention secondary to RADHA stage III most likely ischemic ATN in the setting of prolonged diarrhea  Temporary HD cath in place  For dialysis today  Continue IV fluid normal saline at 125 cc an hour  Creatinine was 4.6 on admission peaking at 5.7. Baseline 1.1-1.3  Creatinine this a.m. 5.0  Continue to monitor creatinine  Strict I's and O's  CKD stage II without proteinuria  Ultrasound showing diffuse bilateral renal cortical thinning  Strict I's and O's  Creatinine this a.m. 5.0  HAGMA secondary to uremia  On hemodialysis per nephrology  Hyperkalemia secondary to RADHA-resolved  Started on JEROME  K today 4.8  Hypomag secondary to diarrhea  Replace as needed mg 2.0  Hypertension  On metoprolol and amlodipine.   Hold lisinopril  History of vitamin D deficiency  History of HFpEF  proBNP 4751 on admission  Repeat 8352  Today 7162  History of A.  Fib  Continue Eliquis  History of type 2 diabetes  Sliding scale  History hypothyroidism  Continue Synthroid  History of hyperlipidemia  Continue statin    PT/OT evaluation:   DVT prophylaxis/ GI prophylaxis: eliquis/diet  Disposition: continue current care     Thor Matos DO, PhD  PGY-1  Attending physician: Dr. Nadia Rolle

## 2022-03-14 NOTE — PROGRESS NOTES
Department of Internal Medicine  Nephrology Progress Note      Events reviewed. SUBJECTIVE:  We are following Mr. Rocha for RADHA on CKD. He reports no complaints. No SOB    PHYSICAL EXAM:      Vitals:    VITALS:  BP (!) 103/46   Pulse 58   Temp 98 °F (36.7 °C) (Temporal)   Resp 18   Ht 5' 7\" (1.702 m)   Wt 246 lb 4.1 oz (111.7 kg)   SpO2 96%   BMI 38.57 kg/m²   24HR INTAKE/OUTPUT:      Intake/Output Summary (Last 24 hours) at 3/14/2022 1124  Last data filed at 3/14/2022 7256  Gross per 24 hour   Intake --   Output 2150 ml   Net -2150 ml     Access: Right temporary dialysis catheter  Constitutional:  Alert, elderly, well developed, no acute distress  HEENT:  PERRLA, normocephalic, atraumtic  Respiratory:  CTAB  Cardiovascular/Edema:  S1/S2, RRR  Gastrointestinal:  Soft, rounded, non-tender  Neurologic:  AxOx3  Skin:  Warm, dry, dry mucous membranes   Other:  Prince- minimal urine noted, no edema    Scheduled Meds:   analgesic ointment   Topical Once    insulin glargine  15 Units SubCUTAneous QAM AC    [Held by provider] amLODIPine  5 mg Oral Daily    atorvastatin  40 mg Oral Daily    [Held by provider] bumetanide  2 mg Oral Daily    apixaban  5 mg Oral BID    levothyroxine  88 mcg Oral Daily    [Held by provider] lisinopril  40 mg Oral Daily    metoprolol succinate  25 mg Oral Daily    sodium chloride flush  5-40 mL IntraVENous 2 times per day    insulin lispro  0-6 Units SubCUTAneous TID WC    insulin lispro  0-3 Units SubCUTAneous Nightly     Continuous Infusions:   sodium chloride      dextrose       PRN Meds:.sodium chloride flush, sodium chloride, ondansetron **OR** ondansetron, acetaminophen **OR** acetaminophen, polyethylene glycol, glucose, dextrose, glucagon (rDNA), dextrose, ipratropium-albuterol    DATA:    CBC:   Lab Results   Component Value Date    WBC 6.6 03/14/2022    RBC 2.89 03/14/2022    HGB 8.6 03/14/2022    HCT 27.4 03/14/2022    MCV 94.8 03/14/2022    MCH 29.8 03/14/2022 MCHC 31.4 03/14/2022    RDW 15.3 03/14/2022     03/14/2022    MPV 9.6 03/14/2022     CMP:    Lab Results   Component Value Date     03/14/2022    K 4.8 03/14/2022    K 5.0 03/10/2022    CL 97 03/14/2022    CO2 24 03/14/2022    BUN 45 03/14/2022    CREATININE 5.0 03/14/2022    GFRAA 14 03/14/2022    LABGLOM 12 03/14/2022    GLUCOSE 144 03/14/2022    GLUCOSE 133 04/18/2012    PROT 7.6 03/10/2022    LABALBU 4.0 03/10/2022    LABALBU 4.4 10/25/2011    CALCIUM 8.5 03/14/2022    BILITOT 0.5 03/10/2022    ALKPHOS 89 03/10/2022    AST 15 03/10/2022    ALT 22 03/10/2022     Magnesium:    Lab Results   Component Value Date    MG 2.0 03/14/2022     Phosphorus:    Lab Results   Component Value Date    PHOS 6.2 03/14/2022     Radiology Review:    US retroperitoneal 3/11/2022   1.  Diffuse, bilateral renal cortical thinning. BRIEF SUMMARY OF INITIAL CONSULT:    Briefly Mr. Rocha is a 49-year-old  man with history of HTN, DM Type II, history of ischemic ATN for which he required HD treatment for 4 days with adequate renal function recovery in May 2017, atrial fibrillation, HFpEF, NIKO, pacemaker placement, hypothyroidism, hyperlipidemia, status post back surgery x2, who was admitted on March 10th, 2022 for urinary retention, reported that he had not urinated since 10 pm the prior night. The patient reports to having diarrhea 2 times daily for the past week while still taking his home medications. A post void residual was obtained in the ER, <50cc. Patient received 2L of NS in the ED. Labs significant to this admission include: potassium 5, BUN 63, creatinine 4.6, anion gap 17 which is the reason for consultation. Home medications taken prior to admission include: bumex, lisinopril, metformin, metoprolol, cholecalciferol, amlodipine. Problems resolved. · Hyperkalemia 2/2 RADHA in the setting of ACE inhibition administration.  Lokelama 10G x3 doses and will start HD today  · Hyponatremia, 2/2 decreased free water excretion from decreased GFR (RADHA)    · HAGMA 2/2 uremia,  started on RRT 3/10 X2 hrs, for HD X3 hrs today. · Hypomagnesia 2/2 GI losses, to replace     IMPRESSION/RECOMMENDATIONS:      1. RADHA Stage III (anuric), most likely ischemic ATN in the setting of prolonged diarrhea with diuretic and ACE inhibition administration. Right femoral temporary dialysis catheter placed 3/10 by Dr. Carlos Gunderson. Urine output has increased last 24 hours with stable creatinine levels indicating onset of renal recovery. We will hold dialysis today. 2. CKD Stage 2 without proteinuria, as evidence by renal US demonstrating diffuse, bilateral renal cortical thinning. Baseline creatinine 1.1-1.3mg/dL. 3. HTN, on metoprolol and amlodipine  4. H/o vitamin d deficiency, on cholecalciferol  5. EFpEF 55-60%, proBNP 4,751 >8352 >7162  ------------------------------------------------------------------------------------------------------  6. Atrial fibrillation, on apixaban  7. DM type II, on insulin  8. Hypothyroidism, on levothyroxine   9. Hyperlipidemia, on statin  10. Anemia, multifactorial  11.  Nutrition: low potassium, 1L fluid restriction     Plan:    · No dialysis today  · Continue to monitor renal function for recovery  · Continue to hold lisinopril  · Strict I's and O's      Electronically signed by Jose Lam MD on 3/14/2022 at 11:24 AM

## 2022-03-14 NOTE — CARE COORDINATION
3/14 Care Coordination: Pt now started on HD 3/12. Temp cath placed. Continue to monitor renal function for recovery. Will need to determine if will be long term. pt lives with his wife. HAs O2 at home wears at night 4lnc. O2 is from [employer]. CM verified his O2 with them. Pt states was Independent, uses no Aides. He does have a walker at home. Plan is discharge Home, Will need to monitor this paln along with if will be long term HD. [facility] will continue to follow for discharge planning.    Bebe FLORESN,RN--BC  [phone]

## 2022-03-15 ENCOUNTER — APPOINTMENT (OUTPATIENT)
Dept: GENERAL RADIOLOGY | Age: 67
DRG: 683 | End: 2022-03-15
Payer: MEDICARE

## 2022-03-15 LAB
ANION GAP SERPL CALCULATED.3IONS-SCNC: 14 MMOL/L (ref 7–16)
ANION GAP SERPL CALCULATED.3IONS-SCNC: 14 MMOL/L (ref 7–16)
BASOPHILS ABSOLUTE: 0.05 E9/L (ref 0–0.2)
BASOPHILS RELATIVE PERCENT: 0.7 % (ref 0–2)
BUN BLDV-MCNC: 37 MG/DL (ref 6–23)
BUN BLDV-MCNC: 39 MG/DL (ref 6–23)
CALCIUM IONIZED: 1.23 MMOL/L (ref 1.15–1.33)
CALCIUM SERPL-MCNC: 8.6 MG/DL (ref 8.6–10.2)
CALCIUM SERPL-MCNC: 9 MG/DL (ref 8.6–10.2)
CHLORIDE BLD-SCNC: 97 MMOL/L (ref 98–107)
CHLORIDE BLD-SCNC: 98 MMOL/L (ref 98–107)
CO2: 24 MMOL/L (ref 22–29)
CO2: 24 MMOL/L (ref 22–29)
CREAT SERPL-MCNC: 3.2 MG/DL (ref 0.7–1.2)
CREAT SERPL-MCNC: 3.6 MG/DL (ref 0.7–1.2)
EOSINOPHILS ABSOLUTE: 0.34 E9/L (ref 0.05–0.5)
EOSINOPHILS RELATIVE PERCENT: 5.1 % (ref 0–6)
GFR AFRICAN AMERICAN: 20
GFR AFRICAN AMERICAN: 23
GFR NON-AFRICAN AMERICAN: 17 ML/MIN/1.73
GFR NON-AFRICAN AMERICAN: 19 ML/MIN/1.73
GLUCOSE BLD-MCNC: 137 MG/DL (ref 74–99)
GLUCOSE BLD-MCNC: 267 MG/DL (ref 74–99)
HCT VFR BLD CALC: 28.3 % (ref 37–54)
HEMOGLOBIN: 9.1 G/DL (ref 12.5–16.5)
IMMATURE GRANULOCYTES #: 0.1 E9/L
IMMATURE GRANULOCYTES %: 1.5 % (ref 0–5)
LYMPHOCYTES ABSOLUTE: 0.95 E9/L (ref 1.5–4)
LYMPHOCYTES RELATIVE PERCENT: 14.2 % (ref 20–42)
MAGNESIUM: 1.6 MG/DL (ref 1.6–2.6)
MCH RBC QN AUTO: 30.2 PG (ref 26–35)
MCHC RBC AUTO-ENTMCNC: 32.2 % (ref 32–34.5)
MCV RBC AUTO: 94 FL (ref 80–99.9)
METER GLUCOSE: 139 MG/DL (ref 74–99)
METER GLUCOSE: 161 MG/DL (ref 74–99)
METER GLUCOSE: 170 MG/DL (ref 74–99)
METER GLUCOSE: 236 MG/DL (ref 74–99)
MONOCYTES ABSOLUTE: 0.5 E9/L (ref 0.1–0.95)
MONOCYTES RELATIVE PERCENT: 7.5 % (ref 2–12)
NEUTROPHILS ABSOLUTE: 4.74 E9/L (ref 1.8–7.3)
NEUTROPHILS RELATIVE PERCENT: 71 % (ref 43–80)
PDW BLD-RTO: 15 FL (ref 11.5–15)
PLATELET # BLD: 225 E9/L (ref 130–450)
PMV BLD AUTO: 9.5 FL (ref 7–12)
POTASSIUM SERPL-SCNC: 4.2 MMOL/L (ref 3.5–5)
POTASSIUM SERPL-SCNC: 4.6 MMOL/L (ref 3.5–5)
PRO-BNP: 3800 PG/ML (ref 0–125)
RBC # BLD: 3.01 E12/L (ref 3.8–5.8)
SODIUM BLD-SCNC: 135 MMOL/L (ref 132–146)
SODIUM BLD-SCNC: 136 MMOL/L (ref 132–146)
WBC # BLD: 6.7 E9/L (ref 4.5–11.5)

## 2022-03-15 PROCEDURE — 36415 COLL VENOUS BLD VENIPUNCTURE: CPT

## 2022-03-15 PROCEDURE — 6370000000 HC RX 637 (ALT 250 FOR IP): Performed by: INTERNAL MEDICINE

## 2022-03-15 PROCEDURE — 80048 BASIC METABOLIC PNL TOTAL CA: CPT

## 2022-03-15 PROCEDURE — 99231 SBSQ HOSP IP/OBS SF/LOW 25: CPT | Performed by: INTERNAL MEDICINE

## 2022-03-15 PROCEDURE — S5553 INSULIN LONG ACTING 5 U: HCPCS | Performed by: INTERNAL MEDICINE

## 2022-03-15 PROCEDURE — 97165 OT EVAL LOW COMPLEX 30 MIN: CPT

## 2022-03-15 PROCEDURE — 83880 ASSAY OF NATRIURETIC PEPTIDE: CPT

## 2022-03-15 PROCEDURE — 6360000002 HC RX W HCPCS: Performed by: STUDENT IN AN ORGANIZED HEALTH CARE EDUCATION/TRAINING PROGRAM

## 2022-03-15 PROCEDURE — 99232 SBSQ HOSP IP/OBS MODERATE 35: CPT

## 2022-03-15 PROCEDURE — 71045 X-RAY EXAM CHEST 1 VIEW: CPT

## 2022-03-15 PROCEDURE — 2580000003 HC RX 258: Performed by: INTERNAL MEDICINE

## 2022-03-15 PROCEDURE — 83735 ASSAY OF MAGNESIUM: CPT

## 2022-03-15 PROCEDURE — 1200000000 HC SEMI PRIVATE

## 2022-03-15 PROCEDURE — 97530 THERAPEUTIC ACTIVITIES: CPT

## 2022-03-15 PROCEDURE — 2700000000 HC OXYGEN THERAPY PER DAY

## 2022-03-15 PROCEDURE — 82330 ASSAY OF CALCIUM: CPT

## 2022-03-15 PROCEDURE — 82962 GLUCOSE BLOOD TEST: CPT

## 2022-03-15 PROCEDURE — 85025 COMPLETE CBC W/AUTO DIFF WBC: CPT

## 2022-03-15 PROCEDURE — 94660 CPAP INITIATION&MGMT: CPT

## 2022-03-15 PROCEDURE — 97161 PT EVAL LOW COMPLEX 20 MIN: CPT

## 2022-03-15 RX ORDER — MAGNESIUM SULFATE IN WATER 40 MG/ML
2000 INJECTION, SOLUTION INTRAVENOUS ONCE
Status: COMPLETED | OUTPATIENT
Start: 2022-03-15 | End: 2022-03-15

## 2022-03-15 RX ADMIN — Medication 10 ML: at 21:56

## 2022-03-15 RX ADMIN — LEVOTHYROXINE SODIUM 88 MCG: 0.09 TABLET ORAL at 08:26

## 2022-03-15 RX ADMIN — INSULIN LISPRO 1 UNITS: 100 INJECTION, SOLUTION INTRAVENOUS; SUBCUTANEOUS at 22:13

## 2022-03-15 RX ADMIN — INSULIN LISPRO 1 UNITS: 100 INJECTION, SOLUTION INTRAVENOUS; SUBCUTANEOUS at 08:15

## 2022-03-15 RX ADMIN — INSULIN GLARGINE-YFGN 15 UNITS: 100 INJECTION, SOLUTION SUBCUTANEOUS at 06:13

## 2022-03-15 RX ADMIN — Medication 10 ML: at 08:56

## 2022-03-15 RX ADMIN — METOPROLOL SUCCINATE 25 MG: 25 TABLET, EXTENDED RELEASE ORAL at 08:15

## 2022-03-15 RX ADMIN — APIXABAN 5 MG: 5 TABLET, FILM COATED ORAL at 08:15

## 2022-03-15 RX ADMIN — MAGNESIUM SULFATE HEPTAHYDRATE 2000 MG: 2 INJECTION, SOLUTION INTRAVENOUS at 14:27

## 2022-03-15 RX ADMIN — APIXABAN 5 MG: 5 TABLET, FILM COATED ORAL at 21:00

## 2022-03-15 RX ADMIN — INSULIN LISPRO 1 UNITS: 100 INJECTION, SOLUTION INTRAVENOUS; SUBCUTANEOUS at 12:30

## 2022-03-15 RX ADMIN — ATORVASTATIN CALCIUM 40 MG: 40 TABLET, FILM COATED ORAL at 08:15

## 2022-03-15 ASSESSMENT — PAIN SCALES - GENERAL: PAINLEVEL_OUTOF10: 0

## 2022-03-15 NOTE — PROGRESS NOTES
[address]      Date:3/15/2022                                                  Patient Name: Natalya Casillas  MRN: [id number]  : 1955  Room: [address] [place] [address]    Evaluating OT: GERMÁN Vázquez, OTR/L [id number]  Referring Maria C Mansfield MD  Specific Provider Orders: OT eval and treat 3/15  Recommended Adaptive Equipment: TBD     Diagnosis: RADHA   Surgery: none   Pertinent Medical History: DM, HTN, HLD, hypothyroidism, a-fib, obesity   Precautions:  Fall Risk, O2, HD    Assessment of current deficits   [x] Functional mobility  [x]ADLs  [x] Strength               [x]Cognition   [x] Functional transfers   [x] IADLs         [x] Safety Awareness   [x]Endurance   [] Fine Coordination              [x] Balance      [] Vision/perception   [x]Sensation    []Gross Motor Coordination  [] ROM  [] Delirium                   [] Motor Control     OT PLAN OF CARE   OT POC based on physician orders, patient diagnosis and results of clinical assessment    Frequency/Duration: 2-3 days/wk for 2 weeks PRN   Specific OT Treatment to include:   * Instruction/training on adapted ADL techniques and AE recommendations to increase functional independence within precautions       * Training on energy conservation strategies, correct breathing pattern and techniques to improve independence/tolerance for self-care routine  * Functional transfer/mobility training/DME recommendations for increased independence, safety, and fall prevention  * Patient/Family education to increase follow through with safety techniques and functional independence  * Recommendation of environmental modifications for increased safety with functional transfers/mobility and ADLs  * Therapeutic exercise to improve motor endurance, ROM, and functional strength for ADLs/functional transfers  * Therapeutic activities to facilitate/challenge dynamic balance, stand tolerance for increased safety and independence with ADLs  * Neuro-muscular re-education: facilitation of righting/equilibrium reactions, midline orientation, scapular stability/mobility, normalization of muscle tone, and facilitation of volitional active controled movement    Home Living: Pt lives with wife in a 2 story home; bed/bath on 2nd level, 1/2 bath on main level   Bathroom setup: tub shower unit   Equipment owned: ww, shower chair, BSC  Prior Level of Function: assist with ADLs/IADLs; using cane for functional mobility   Driving: yes    Pain Level: 0/10  Cognition: A&O: 3/4; Follows multi step directions    Memory:  good    Sequencing:  good    Problem solving:  fair    Judgement/safety:  fair     Functional Assessment:  AM-PAC Daily Activity Raw Score: 16/24   Initial Eval Status  Date: 3/15/22 Treatment Status  Date: STGs=LTGs  Time Frame: 10-14 days   Feeding IND (pt able to open packages and self feed)      Grooming SBA (standing at sink)   IND   UB Dressing Min A   SBA   LB Dressing Max A (assist with socks)  Mod A    Bathing Mod A (simulated)  Min A    Toileting Mod A  Min A   Bed Mobility  Log roll: nt  Supine to sit: nt   Sit to supine: nt   Log roll sup  Supine to sit: sup   Sit to supine: sup   Functional Transfers Sit to stand:SBA   Stand to sit:SBA  Commode: SBA  SUP   Functional Mobility SBA (using ww, to/from bathroom 2x, assist with oxygen line)  SUP   Balance Sitting: SBA  Standing: SBA     Activity Tolerance fair     Visual/  Perceptual Glasses: yes              UE ROM: BUE: elbow flex WFL, shoulder flex grossly 110'  Strength: RUE: grossly 3+/5 LUE: grossly 3+/5   Strength: B WFL  Fine Motor Coordination:  WFL     Hearing: WFL  Sensation:  No c/o numbness/tingling   Tone:  WFL  Edema: BLEs                            Comments:Cleared by RN to see pt. Upon arrival, patient sitting in chair and agreeable to OT session.  At end of session, patient sitting in chair with call light and phone within reach, all lines and tubes intact. Pt would benefit from continued OT to increase functional independence and quality of life. Treatment: Completed ADLs/functional transfers, see above for assessment. Pt required vc's and physical assist for proper technique/safety with hand placement/body mechanics/posture for ADLs/functional tranfers/mobility/ww management. Pt required vc's for sequencing/initiation of ADLs/functional transfers. Pt required increased time to complete ADLs/functional transfers due to management of multiple lines. Pt required skilled monitoring of SpO2 during session. Initially 94% on 3L. NC removed and pt was at 93% RA. Post ambulation 2x to the bathroom, SpO2 decreased to 85% RA. NC reapplied. RN notified. Pt required rest breaks during session. Pt appeared to have tolerated session well and appears cooperative/pleasant . Pt instructed on use of call light for assistance and fall prevention. Pt demo'ing fair understanding of education provided. Continue to educate. Eval Complexity: Low    Rehab Potential: Good for established goals, pt. assisted in establishment of goals. LTG: maximize independence with ADLs to return to PLOF    Patient instructed on diagnosis, prognosis/goals and plan of care. Demonstrated fair understanding. [] Malnutrition indicators have been identified and nursing has been notified to ensure a dietitian consult is ordered. Evaluation time includes thorough review of current medical information, gathering information on past medical & social history & PLOF, completion of standardized testing, informal observation of tasks, consultation with other medical professions/disciplines, assessment of data & development of POC/goals.      Time In: 2:05       Time Out: 2:15     Total treatment time: 0       Treatment Charges: Mins Units   OT Eval Low 92134 X    OT Eval Medium 42779     OT Eval High 16997     OT Re-Eval [id number]     Ther Ex  [id number]       Manual Therapy [address]       ADL/Home Mgt [id number]     Neuro Re-ed [address] manage/training  [id number]       Non-Billable Time           Sharif Mares, OTR/L [id number]

## 2022-03-15 NOTE — PROGRESS NOTES
[facility]  Internal Medicine Residency Program  Progress Note - House Team     Patient:  Jerry Perez 74 y.o. male MRN: [id number]     Date of Service: 3/15/2022     CC: Urinary retention  Overnight events: none    Subjective     Patient was seen and examined this morning at bedside in no acute distress. He states that he is having trouble sleeping in the hospital ad is requesting to go home. He also notes that his R great toe is painful. No plans for dialysis today. Objective     Physical Exam:  TEMPERATURE:  Current - Temp: 98.1 °F (36.7 °C); Max - Temp  Av.6 °F (36.4 °C)  Min: 97.3 °F (36.3 °C)  Max: 98.1 °F (36.7 °C)  RESPIRATIONS RANGE: Resp  Av.7  Min: 18  Max: 20  PULSE RANGE: Pulse  Av.7  Min: 64  Max: 71  BLOOD PRESSURE RANGE:  Systolic (05RVL), IYL:249 , Min:109 , NUM:337   ; Diastolic (77LXI), XI, Min:40, Max:73    PULSE OXIMETRY RANGE: SpO2  Av %  Min: 91 %  Max: 99 %    I & O - 24hr:    Intake/Output Summary (Last 24 hours) at 3/15/2022 1108  Last data filed at 3/15/2022 0600  Gross per 24 hour   Intake --   Output 4050 ml   Net -4050 ml     I/O last 3 completed shifts:  In: -   Out: 8622 [Urine:5450] No intake/output data recorded. Weight change:     Physical Exam  Constitutional:       General: He is not in acute distress. Appearance: He is well-developed. He is not diaphoretic. HENT:      Head: Normocephalic and atraumatic. Eyes:      General: No scleral icterus. Pupils: Pupils are equal, round, and reactive to light. Neck:      Thyroid: No thyromegaly. Vascular: No JVD. Trachea: No tracheal deviation. Cardiovascular:      Rate and Rhythm: Normal rate and regular rhythm. Heart sounds: Normal heart sounds. No murmur heard. No friction rub. No gallop. Pulmonary:      Effort: Pulmonary effort is normal. No respiratory distress. Breath sounds: Normal breath sounds. No wheezing or rales.    Abdominal:      General: leukocytes not seenEpithelial cells not seenRare Erythrocytes     Urine   Urine Culture, Routine   Date Value Ref Range Status   05/25/2017 Growth not present  Final     Legionella No results found for: LABLEGI  C Diff PCR No results found for: CDIFPCR  Wound culture/abscess: No results for input(s): WNDABS in the last 72 hours. Tip culture:No results for input(s): CXCATHTIP in the last 72 hours. US RETROPERITONEAL COMPLETE    Result Date: 3/11/2022  EXAMINATION: RETROPERITONEAL ULTRASOUND OF THE KIDNEYS AND URINARY BLADDER 3/11/2022 COMPARISON: None HISTORY: ORDERING SYSTEM PROVIDED HISTORY: r/o obstruction TECHNOLOGIST PROVIDED HISTORY: Reason for exam:->r/o obstruction What reading provider will be dictating this exam?->CRC FINDINGS: Kidneys: The right kidney measures 11.0 cm in length and the left kidney measures 12.1 cm in length. Kidneys demonstrate normal cortical echogenicity. No evidence of hydronephrosis or intrarenal stones. No renal mass is seen. There is diffuse, bilateral renal cortical thinning. No perinephric fluid is seen bilaterally. Bladder: The urinary bladder is collapsed and there is reportedly a Prince catheter present. 1.  Diffuse, bilateral renal cortical thinning. Resident's Assessment and Plan     Assessment and Plan:    Urinary retention secondary to RADHA stage III most likely ischemic ATN in the setting of prolonged diarrhea  Temporary HD cath in place  HD on hold per nephro  Creatinine was 4.6 on admission peaking at 5.7.   Baseline 1.1-1.3  Creatinine this a.m. 3.6 -> down from 4.4 yesterday  Continue to monitor creatinine  Strict I's and O's - making good urine at this point  CKD stage II without proteinuria  Ultrasound showing diffuse bilateral renal cortical thinning  Strict I's and O's  Creatinine this a.m. 3.6  HAGMA secondary to uremia - resolved  Hyperkalemia secondary to RADHA-resolved  K today 4.2  Hypomag secondary to diarrhea  Replace as needed mg 1. 6  Hypertension  All hypertensive meds held for soft pressures  History of vitamin D deficiency  History of HFpEF  proBNP 4751 on admission  Repeat 8352  Today 3800  History of A.  Fib  Continue Eliquis  History of type 2 diabetes  Sliding scale  History hypothyroidism  Continue Synthroid  History of hyperlipidemia  Continue statin    PT/OT evaluation:   DVT prophylaxis/ GI prophylaxis: eliquis/diet  Disposition: continue current care     Dov Quintero DO, PhD  PGY-1  Attending physician: Dr. Marilyn Rose

## 2022-03-15 NOTE — PROGRESS NOTES
[facility]  Internal Medicine Residency / [address]    Attending Physician Statement  I have discussed the case, including pertinent history and exam findings with the resident and the team.  I have seen and examined the patient and the key elements of the encounter have been performed by me. I agree with the assessment, plan and orders as documented by the resident. Much more animated this AM  Oliguria improving   Cr 3.6 this AM  BP improving  Standing BP ordered  Hopefully discharge in 24 hours   Remainder of medical problems as per resident note.       Jay Silva MD [facility]  Internal Medicine Residency Faculty

## 2022-03-15 NOTE — CARE COORDINATION
3/15 Care Coordination: Pt HD remains on Hold, Kidney function improving. .Plan is discharge Home. pt lives with his wife. HAs O2 at home wears at night 4lnc. O2 is from [employer] [facility] [employer] Will get PT/OT evals. CM/SW will continue to follow for discharge planning.    José FLORESN,RN-CV-BC  [phone]

## 2022-03-15 NOTE — PROGRESS NOTES
Vascular Surgery Progress Note    Pt is being seen in f/u today regarding need for dialysis access    Subjective  Pt s/e.   Denies pain  Denies CP, SOB  Temporary dialysis catheter site tender  Prince still with good output    Current Medications:    sodium chloride      dextrose        diclofenac sodium, sodium chloride flush, sodium chloride, ondansetron **OR** ondansetron, acetaminophen **OR** acetaminophen, polyethylene glycol, glucose, dextrose, glucagon (rDNA), dextrose, ipratropium-albuterol    magnesium sulfate  2,000 mg IntraVENous Once    analgesic ointment   Topical Once    insulin glargine  15 Units SubCUTAneous QAM AC    [Held by provider] amLODIPine  5 mg Oral Daily    atorvastatin  40 mg Oral Daily    [Held by provider] bumetanide  2 mg Oral Daily    apixaban  5 mg Oral BID    levothyroxine  88 mcg Oral Daily    [Held by provider] lisinopril  40 mg Oral Daily    metoprolol succinate  25 mg Oral Daily    sodium chloride flush  5-40 mL IntraVENous 2 times per day    insulin lispro  0-6 Units SubCUTAneous TID WC    insulin lispro  0-3 Units SubCUTAneous Nightly      PHYSICAL EXAM:    BP (!) 121/58   Pulse 71   Temp 98.1 °F (36.7 °C) (Oral)   Resp 18   Ht 5' 7\" (1.702 m)   Wt 246 lb 4.1 oz (111.7 kg)   SpO2 95%   BMI 38.57 kg/m²     Intake/Output Summary (Last 24 hours) at 3/15/2022 1110  Last data filed at 3/15/2022 0600  Gross per 24 hour   Intake --   Output 4050 ml   Net -4050 ml        Gen: awake, alert, oriented x3, in no apparent distress  CVS: S1, S2  Resp: No increased work of breathing, on 3L nasal cannula  Abd: soft, non tender, non distended  R LE: +3 edema  L LE: +3 edema  R groin: clean, dry, no hematoma, ecchymosis    LABS:    Lab Results   Component Value Date    WBC 6.7 03/15/2022    HGB 9.1 (L) 03/15/2022    HCT 28.3 (L) 03/15/2022     03/15/2022    PROTIME 14.8 (H) 03/11/2022    INR 1.3 03/11/2022    APTT 34.4 03/11/2022    K 4.2 03/15/2022    BUN 39 (H) 03/15/2022    CREATININE 3.6 (H) 03/15/2022     A/P   Acute renal failure requiring dialysis  · Temporary dialysis catheter placed 3/11/22  · Dialysis per nephrology  · Kidney function improving  · No need for tunneled catheter per nephrology  · Please call as needed    Sherif Whelan, JACQUELINE - CNP

## 2022-03-15 NOTE — PROGRESS NOTES
Patient refuses bipap stating he does not like the way our mask fits. He would like for us to find a nasal mask but we do not have those available in the facility.

## 2022-03-15 NOTE — PROGRESS NOTES
Department of Internal Medicine  Nephrology Progress Note      Events reviewed. SUBJECTIVE:  We are following Mr. Rocha for RADHA on CKD. He reports no complaints. No SOB    PHYSICAL EXAM:      Vitals:    VITALS:  BP (!) 121/58   Pulse 71   Temp 98.1 °F (36.7 °C) (Oral)   Resp 18   Ht 5' 7\" (1.702 m)   Wt 246 lb 4.1 oz (111.7 kg)   SpO2 95%   BMI 38.57 kg/m²   24HR INTAKE/OUTPUT:      Intake/Output Summary (Last 24 hours) at 3/15/2022 1406  Last data filed at 3/15/2022 1243  Gross per 24 hour   Intake --   Output 4825 ml   Net -4825 ml     Access: Right temporary dialysis catheter  Constitutional:  Alert, elderly, well developed, no acute distress  HEENT:  PERRLA, normocephalic, atraumtic  Respiratory:  CTAB  Cardiovascular/Edema:  S1/S2, RRR  Gastrointestinal:  Soft, rounded, non-tender  Neurologic:  AxOx3  Skin:  Warm, dry, dry mucous membranes   Other:  Prince- minimal urine noted, no edema    Scheduled Meds:   magnesium sulfate  2,000 mg IntraVENous Once    analgesic ointment   Topical Once    insulin glargine  15 Units SubCUTAneous QAM AC    [Held by provider] amLODIPine  5 mg Oral Daily    atorvastatin  40 mg Oral Daily    [Held by provider] bumetanide  2 mg Oral Daily    apixaban  5 mg Oral BID    levothyroxine  88 mcg Oral Daily    [Held by provider] lisinopril  40 mg Oral Daily    metoprolol succinate  25 mg Oral Daily    sodium chloride flush  5-40 mL IntraVENous 2 times per day    insulin lispro  0-6 Units SubCUTAneous TID WC    insulin lispro  0-3 Units SubCUTAneous Nightly     Continuous Infusions:   sodium chloride      dextrose       PRN Meds:.diclofenac sodium, sodium chloride flush, sodium chloride, ondansetron **OR** ondansetron, acetaminophen **OR** acetaminophen, polyethylene glycol, glucose, dextrose, glucagon (rDNA), dextrose, ipratropium-albuterol    DATA:    CBC:   Lab Results   Component Value Date    WBC 6.7 03/15/2022    RBC 3.01 03/15/2022    HGB 9.1 03/15/2022 HCT 28.3 03/15/2022    MCV 94.0 03/15/2022    MCH 30.2 03/15/2022    MCHC 32.2 03/15/2022    RDW 15.0 03/15/2022     03/15/2022    MPV 9.5 03/15/2022     CMP:    Lab Results   Component Value Date     03/15/2022    K 4.2 03/15/2022    K 5.0 03/10/2022    CL 97 03/15/2022    CO2 24 03/15/2022    BUN 39 03/15/2022    CREATININE 3.6 03/15/2022    GFRAA 20 03/15/2022    LABGLOM 17 03/15/2022    GLUCOSE 267 03/15/2022    GLUCOSE 133 04/18/2012    PROT 7.6 03/10/2022    LABALBU 4.0 03/10/2022    LABALBU 4.4 10/25/2011    CALCIUM 8.6 03/15/2022    BILITOT 0.5 03/10/2022    ALKPHOS 89 03/10/2022    AST 15 03/10/2022    ALT 22 03/10/2022     Magnesium:    Lab Results   Component Value Date    MG 1.6 03/15/2022     Phosphorus:    Lab Results   Component Value Date    PHOS 6.2 03/14/2022     Radiology Review:    US retroperitoneal 3/11/2022   1.  Diffuse, bilateral renal cortical thinning. BRIEF SUMMARY OF INITIAL CONSULT:    Briefly Mr. Rocha is a 69-year-old  man with history of HTN, DM Type II, history of ischemic ATN for which he required HD treatment for 4 days with adequate renal function recovery in May 2017, atrial fibrillation, HFpEF, NIKO, pacemaker placement, hypothyroidism, hyperlipidemia, status post back surgery x2, who was admitted on March 10th, 2022 for urinary retention, reported that he had not urinated since 10 pm the prior night. The patient reports to having diarrhea 2 times daily for the past week while still taking his home medications. A post void residual was obtained in the ER, <50cc. Patient received 2L of NS in the ED. Labs significant to this admission include: potassium 5, BUN 63, creatinine 4.6, anion gap 17 which is the reason for consultation. Home medications taken prior to admission include: bumex, lisinopril, metformin, metoprolol, cholecalciferol, amlodipine. Problems resolved. · Hyperkalemia 2/2 RADHA in the setting of ACE inhibition administration. Skylakelama 10G x3 doses and will start HD today  · Hyponatremia, 2/2 decreased free water excretion from decreased GFR (RADHA)    · HAGMA 2/2 uremia,  started on RRT 3/10 X2 hrs, for HD X3 hrs today. · Hypomagnesia 2/2 GI losses, to replace     IMPRESSION/RECOMMENDATIONS:      1. RADHA Stage III (anuric), most likely ischemic ATN in the setting of prolonged diarrhea with diuretic and ACE inhibition administration. Right femoral temporary dialysis catheter placed 3/10 by Dr. Harry Starr. Function continues to improve with excellent urine output, creatinine down to 3.6 mg/dL. 2. CKD Stage 2 without proteinuria, as evidence by renal US demonstrating diffuse, bilateral renal cortical thinning. Baseline creatinine 1.1-1.3mg/dL. 3. HTN, on metoprolol and amlodipine  4. H/o vitamin d deficiency, on cholecalciferol  5. EFpEF 55-60%, proBNP 4,751 >8352 >7162  ------------------------------------------------------------------------------------------------------  6. Atrial fibrillation, on apixaban  7. DM type II, on insulin  8. Hypothyroidism, on levothyroxine   9. Hyperlipidemia, on statin  10. Anemia, multifactorial  11.  Nutrition: low potassium, 1L fluid restriction     Plan:    · No longer need of dialysis  · To remove dialysis catheter  · Continue to monitor renal function for recovery  · Continue to hold lisinopril      Electronically signed by Guillermo Spivey MD on 3/15/2022 at 2:06 PM

## 2022-03-15 NOTE — PROGRESS NOTES
Physical Therapy  Physical Therapy Initial Assessment     Name: Bibi Mcmahan  : 1955  MRN: [id number]      Date of Service: 3/15/2022    Evaluating PT:  Indira Briones PT, DPT    Room #:  4396/9410-B  Diagnosis:  RAD[facility] ([facility] [N17.9]  Acute kidney injury ([facility] [N17.9]  PMHx/PSHx:  HLD, HTN, hypothyroidism, OA, DM II, afib, obesity, NIKO  Procedure/Surgery:  N/A  Precautions:  Fall risk, O2  Equipment Needs:  TBD    SUBJECTIVE:    Pt lives with spouse in a 1 story home with 3 steps to enter and 1 handrail. Bed/bath is on 1st floor. Pt ambulated with no AD PTA. OBJECTIVE:   Initial Evaluation  Date: 3/15/22 Treatment Short Term/ Long Term   Goals   AM-PAC 6 Clicks 53/94     Was pt agreeable to Eval/treatment? yes     Does pt have pain? 10/10 back     Bed Mobility  Rolling: NT  Supine to sit: NT  Sit to supine: NT  Scooting: SBA  Rolling: IND  Supine to sit: IND  Sit to supine: IND  Scooting: IND   Transfers Sit to stand: SBA  Stand to sit: SBA  Stand pivot: SBA with no AD  Sit to stand: IND  Stand to sit: IND  Stand pivot: IND   Ambulation    50'x2 with no AD SBA  100'+ with no AD IND   Stair negotiation: ascended and descended  NT  3 steps with 1 handrail mod I     Strength/ROM:   BLE grossly 4/5  BLE AROM WFL    Balance:   Static Sitting: Supervision  Dynamic Sitting: SBA  Static Standing: Supervision  Dynamic Standing: SBA with no AD    Pt is A & O x 3  Sensation:  Pt denies numbness and tingling to extremities  Edema:  BLE 1+    Therapeutic Exercises:    Transfers:  STSx1, cued for hand placement and postural correction  Ambulation: 50'x2 with no AD  BLE AROM    Patient education  Pt educated on role of PT, importance of functional mobility during hospital stay, energy conservation    Patient response to education:   Pt verbalized understanding Pt demonstrated skill Pt requires further education in this area   yes yes partial     ASSESSMENT:    Conditions Requiring Skilled Therapeutic Intervention:    [x]Decreased strength     []Decreased ROM  [x]Decreased functional mobility  [x]Decreased balance   [x]Decreased endurance   [x]Decreased posture  []Decreased sensation  []Decreased coordination   []Decreased vision  []Decreased safety awareness   [x]Increased pain       Comments:    Pt sitting in bedside chair upon entering, agreeable to participate. Pt's family present during session. Pt on 3L of O2 via NC, SpO2 WNL throughout. Pt instructed to transfer from bedside chair, cued for hand placement. Pt standing with good static standing balance without AD. Pt instructed to ambulate to tolerance. Pt ambulating with slow steady pattern, increased lateral sway, pt requiring use of hallway handrail at times to reduce fatigue. Pt requesting to return to room after initial 50'. Pt cued for hand placement and positioning for safe transfer back to bedside chair. Pt educated on energy conservation techniques to implement with functional tasks upon d/c home. Pt remained in bedside chair with family present and all needs met prior to exiting. Treatment:  Patient practiced and was instructed in the following treatment:     STS and pivot transfer training - pt educated on proper hand and foot placement, safety and sequencing, and use of verbal cues to safely complete sit<>stand and pivot transfers.  Gait training- pt was given verbal and tactile cues to facilitate safety and energy conservation during ambulation. Pt's/ family goals   1. Return home    Prognosis is good for reaching above PT goals. Patient and or family understand(s) diagnosis, prognosis, and plan of care.   yes    PHYSICAL THERAPY PLAN OF CARE:    PT POC is established based on physician order and patient diagnosis     Referring provider/PT Order:  Lopez Pinon MD  Diagnosis:  RADHA (acute kidney injury) ([facility] [N17.9]  Acute kidney injury ([facility] [N17.9]  Specific instructions for next treatment:  Progress as tolerated, gait and

## 2022-03-15 NOTE — PROGRESS NOTES
Temporary dialysis catheter removed. Catheter tip intact. Xeroform and gauze dressing applied and pressure held [id number].

## 2022-03-16 VITALS
HEART RATE: 63 BPM | WEIGHT: 246.25 LBS | TEMPERATURE: 97.3 F | RESPIRATION RATE: 18 BRPM | HEIGHT: 67 IN | OXYGEN SATURATION: 94 % | SYSTOLIC BLOOD PRESSURE: 119 MMHG | BODY MASS INDEX: 38.65 KG/M2 | DIASTOLIC BLOOD PRESSURE: 52 MMHG

## 2022-03-16 LAB
ANION GAP SERPL CALCULATED.3IONS-SCNC: 13 MMOL/L (ref 7–16)
BASOPHILS ABSOLUTE: 0.06 E9/L (ref 0–0.2)
BASOPHILS RELATIVE PERCENT: 0.8 % (ref 0–2)
BUN BLDV-MCNC: 34 MG/DL (ref 6–23)
CALCIUM SERPL-MCNC: 8.7 MG/DL (ref 8.6–10.2)
CHLORIDE BLD-SCNC: 101 MMOL/L (ref 98–107)
CO2: 25 MMOL/L (ref 22–29)
CREAT SERPL-MCNC: 2.8 MG/DL (ref 0.7–1.2)
EOSINOPHILS ABSOLUTE: 0.36 E9/L (ref 0.05–0.5)
EOSINOPHILS RELATIVE PERCENT: 4.9 % (ref 0–6)
GFR AFRICAN AMERICAN: 27
GFR NON-AFRICAN AMERICAN: 23 ML/MIN/1.73
GLUCOSE BLD-MCNC: 148 MG/DL (ref 74–99)
HCT VFR BLD CALC: 29.3 % (ref 37–54)
HEMOGLOBIN: 9.4 G/DL (ref 12.5–16.5)
IMMATURE GRANULOCYTES #: 0.1 E9/L
IMMATURE GRANULOCYTES %: 1.4 % (ref 0–5)
LYMPHOCYTES ABSOLUTE: 1.07 E9/L (ref 1.5–4)
LYMPHOCYTES RELATIVE PERCENT: 14.6 % (ref 20–42)
MAGNESIUM: 1.7 MG/DL (ref 1.6–2.6)
MCH RBC QN AUTO: 30.3 PG (ref 26–35)
MCHC RBC AUTO-ENTMCNC: 32.1 % (ref 32–34.5)
MCV RBC AUTO: 94.5 FL (ref 80–99.9)
METER GLUCOSE: 147 MG/DL (ref 74–99)
METER GLUCOSE: 190 MG/DL (ref 74–99)
MONOCYTES ABSOLUTE: 0.68 E9/L (ref 0.1–0.95)
MONOCYTES RELATIVE PERCENT: 9.3 % (ref 2–12)
NEUTROPHILS ABSOLUTE: 5.07 E9/L (ref 1.8–7.3)
NEUTROPHILS RELATIVE PERCENT: 69 % (ref 43–80)
PDW BLD-RTO: 15 FL (ref 11.5–15)
PLATELET # BLD: 244 E9/L (ref 130–450)
PMV BLD AUTO: 9.2 FL (ref 7–12)
POTASSIUM SERPL-SCNC: 4.4 MMOL/L (ref 3.5–5)
RBC # BLD: 3.1 E12/L (ref 3.8–5.8)
SODIUM BLD-SCNC: 139 MMOL/L (ref 132–146)
WBC # BLD: 7.3 E9/L (ref 4.5–11.5)

## 2022-03-16 PROCEDURE — 82962 GLUCOSE BLOOD TEST: CPT

## 2022-03-16 PROCEDURE — S5553 INSULIN LONG ACTING 5 U: HCPCS | Performed by: INTERNAL MEDICINE

## 2022-03-16 PROCEDURE — 80048 BASIC METABOLIC PNL TOTAL CA: CPT

## 2022-03-16 PROCEDURE — 6370000000 HC RX 637 (ALT 250 FOR IP): Performed by: INTERNAL MEDICINE

## 2022-03-16 PROCEDURE — 94660 CPAP INITIATION&MGMT: CPT

## 2022-03-16 PROCEDURE — 99231 SBSQ HOSP IP/OBS SF/LOW 25: CPT | Performed by: INTERNAL MEDICINE

## 2022-03-16 PROCEDURE — 85025 COMPLETE CBC W/AUTO DIFF WBC: CPT

## 2022-03-16 PROCEDURE — 83735 ASSAY OF MAGNESIUM: CPT

## 2022-03-16 PROCEDURE — 36415 COLL VENOUS BLD VENIPUNCTURE: CPT

## 2022-03-16 RX ORDER — POLYVINYL ALCOHOL 14 MG/ML
1 SOLUTION/ DROPS OPHTHALMIC PRN
Status: DISCONTINUED | OUTPATIENT
Start: 2022-03-16 | End: 2022-03-16 | Stop reason: HOSPADM

## 2022-03-16 RX ORDER — FERROUS SULFATE 325(65) MG
325 TABLET ORAL
Status: DISCONTINUED | OUTPATIENT
Start: 2022-03-16 | End: 2022-03-16

## 2022-03-16 RX ADMIN — INSULIN GLARGINE-YFGN 15 UNITS: 100 INJECTION, SOLUTION SUBCUTANEOUS at 06:10

## 2022-03-16 RX ADMIN — ACETAMINOPHEN 650 MG: 325 TABLET ORAL at 04:30

## 2022-03-16 RX ADMIN — LEVOTHYROXINE SODIUM 88 MCG: 0.09 TABLET ORAL at 06:09

## 2022-03-16 RX ADMIN — ATORVASTATIN CALCIUM 40 MG: 40 TABLET, FILM COATED ORAL at 08:44

## 2022-03-16 RX ADMIN — METOPROLOL SUCCINATE 25 MG: 25 TABLET, EXTENDED RELEASE ORAL at 08:44

## 2022-03-16 RX ADMIN — APIXABAN 5 MG: 5 TABLET, FILM COATED ORAL at 08:44

## 2022-03-16 RX ADMIN — FERROUS SULFATE TAB 325 MG (65 MG ELEMENTAL FE) 325 MG: 325 (65 FE) TAB at 08:43

## 2022-03-16 ASSESSMENT — PAIN SCALES - GENERAL: PAINLEVEL_OUTOF10: 10

## 2022-03-16 NOTE — CARE COORDINATION
3/16 Care Coordination: Plan for discharge home today. CM/SW will continue to follow for discharge planning.    Rachel FLORESN,RN--BC  [phone]

## 2022-03-16 NOTE — PROGRESS NOTES
[address]   Internal Medicine Residency / [address]    Attending Physician Statement  I have discussed the case, including pertinent history and exam findings with the resident and the team.  I have seen and examined the patient and the key elements of the encounter have been performed by me. I agree with the assessment, plan and orders as documented by the resident. Appears comfortable  Poor rest in bed  CXR reviewed  Hx of 40 pack year smoking and industrial exposure to polluted air  BMP  Cr now < 3  Has mild orthostatic hypotension  Check pulse Ox with exertion and consider discharge soon  Has lifetime pagophagia   And % Fe saturation is 18  Colonoscopy planned , last one 10 years ago  Remainder of medical problems as per resident note.       Chris Owen MD [facility] SPECIALTY [facility]  Internal Medicine Residency Faculty

## 2022-03-16 NOTE — PROGRESS NOTES
Department of Internal Medicine  Nephrology Progress Note      Events reviewed. SUBJECTIVE:  We are following Mr. Rocha for RADHA on CKD. He reports no complaints. PHYSICAL EXAM:      Vitals:    VITALS:  BP (!) 119/52   Pulse 63   Temp 97.3 °F (36.3 °C) (Temporal)   Resp 18   Ht 5' 7\" (1.702 m)   Wt 246 lb 4.1 oz (111.7 kg)   SpO2 94%   BMI 38.57 kg/m²   24HR INTAKE/OUTPUT:      Intake/Output Summary (Last 24 hours) at 3/16/2022 1053  Last data filed at 3/16/2022 9284  Gross per 24 hour   Intake --   Output 2575 ml   Net -2575 ml     Access: Right temporary dialysis catheter  Constitutional:  Alert, elderly, well developed, no acute distress  HEENT:  PERRLA, normocephalic, atraumtic  Respiratory:  CTAB  Cardiovascular/Edema:  S1/S2, RRR  Gastrointestinal:  Soft, rounded, non-tender  Neurologic:  AxOx3  Skin:  Warm, dry, dry mucous membranes   Other:  Prince- minimal urine noted, no edema    Scheduled Meds:   analgesic ointment   Topical Once    insulin glargine  15 Units SubCUTAneous QAM AC    [Held by provider] amLODIPine  5 mg Oral Daily    atorvastatin  40 mg Oral Daily    [Held by provider] bumetanide  2 mg Oral Daily    apixaban  5 mg Oral BID    levothyroxine  88 mcg Oral Daily    [Held by provider] lisinopril  40 mg Oral Daily    metoprolol succinate  25 mg Oral Daily    sodium chloride flush  5-40 mL IntraVENous 2 times per day    insulin lispro  0-6 Units SubCUTAneous TID WC    insulin lispro  0-3 Units SubCUTAneous Nightly     Continuous Infusions:   sodium chloride      dextrose       PRN Meds:.polyvinyl alcohol, diclofenac sodium, sodium chloride flush, sodium chloride, ondansetron **OR** ondansetron, acetaminophen **OR** acetaminophen, polyethylene glycol, glucose, dextrose, glucagon (rDNA), dextrose, ipratropium-albuterol    DATA:    CBC:   Lab Results   Component Value Date    WBC 7.3 03/16/2022    RBC 3.10 03/16/2022    HGB 9.4 03/16/2022    HCT 29.3 03/16/2022    MCV 94.5 03/16/2022    MCH 30.3 03/16/2022    MCHC 32.1 03/16/2022    RDW 15.0 03/16/2022     03/16/2022    MPV 9.2 03/16/2022     CMP:    Lab Results   Component Value Date     03/16/2022    K 4.4 03/16/2022    K 5.0 03/10/2022     03/16/2022    CO2 25 03/16/2022    BUN 34 03/16/2022    CREATININE 2.8 03/16/2022    GFRAA 27 03/16/2022    LABGLOM 23 03/16/2022    GLUCOSE 148 03/16/2022    GLUCOSE 133 04/18/2012    PROT 7.6 03/10/2022    LABALBU 4.0 03/10/2022    LABALBU 4.4 10/25/2011    CALCIUM 8.7 03/16/2022    BILITOT 0.5 03/10/2022    ALKPHOS 89 03/10/2022    AST 15 03/10/2022    ALT 22 03/10/2022     Magnesium:    Lab Results   Component Value Date    MG 1.7 03/16/2022     Phosphorus:    Lab Results   Component Value Date    PHOS 6.2 03/14/2022     Radiology Review:    US retroperitoneal 3/11/2022   1.  Diffuse, bilateral renal cortical thinning. BRIEF SUMMARY OF INITIAL CONSULT:    Briefly Mr. Rocha is a 78-year-old  man with history of HTN, DM Type II, history of ischemic ATN for which he required HD treatment for 4 days with adequate renal function recovery in May 2017, atrial fibrillation, HFpEF, NIKO, pacemaker placement, hypothyroidism, hyperlipidemia, status post back surgery x2, who was admitted on March 10th, 2022 for urinary retention, reported that he had not urinated since 10 pm the prior night. The patient reports to having diarrhea 2 times daily for the past week while still taking his home medications. A post void residual was obtained in the ER, <50cc. Patient received 2L of NS in the ED. Labs significant to this admission include: potassium 5, BUN 63, creatinine 4.6, anion gap 17 which is the reason for consultation. Home medications taken prior to admission include: bumex, lisinopril, metformin, metoprolol, cholecalciferol, amlodipine. Problems resolved. · Hyperkalemia 2/2 RADHA in the setting of ACE inhibition administration.  Lokelama 10G x3 doses and will start HD today  · Hyponatremia, 2/2 decreased free water excretion from decreased GFR (RADHA)    · HAGMA 2/2 uremia,  started on RRT 3/10 X2 hrs, for HD X3 hrs today. · Hypomagnesia 2/2 GI losses, to replace     IMPRESSION/RECOMMENDATIONS:      1. RADHA Stage III (anuric), most likely ischemic ATN in the setting of prolonged diarrhea with diuretic and ACE inhibition administration. Right femoral temporary dialysis catheter placed 3/10 by Dr. Adilene Lai. Renal function continues to improve, creatinine down to 2.8 mg/dL with excellent urine output. 2. CKD Stage 2 without proteinuria, as evidence by renal US demonstrating diffuse, bilateral renal cortical thinning. Baseline creatinine 1.1-1.3mg/dL. 3. HTN, on metoprolol and amlodipine  4. H/o vitamin d deficiency, on cholecalciferol  5. EFpEF 55-60%, proBNP 4,751 >8352 >7162  ------------------------------------------------------------------------------------------------------  6. Atrial fibrillation, on apixaban  7. DM type II, on insulin  8. Hypothyroidism, on levothyroxine   9. Hyperlipidemia, on statin  10. Anemia, multifactorial  11.  Nutrition: low potassium, 1L fluid restriction     Plan:    · Remove Prince catheter  · Obtain post void residual after removal of Prince  · Continue to monitor renal function  · Continue to hold lisinopril  · Okay to discharge from the poor renal point of view dissection  · Do not restart lisinopril  · Follow-up in the office in 2 to 3 weeks  · BMP weekly x2      Electronically signed by Aroldo Melendez MD on 3/16/2022 at 10:53 AM

## 2022-03-16 NOTE — PROGRESS NOTES
Pulse ox on room air sitting _96___%  Pulse ox on room air ambulating _92__%  Pulse ox on ____ liters recovery, sitting ___%  Pulse ox on ___ liters, ambulating recovery ___%

## 2022-03-16 NOTE — DISCHARGE SUMMARY
[address]  Discharge Summary    PCP: Patsy Gonzalez MD    Admit Date:3/10/2022  Discharge Date: 3/16/2022    Admission Diagnosis:   1. Urinary Retention   2. RADHA stage 3  3. History of HFpEF  4. History of HTN  5. History of OHS  6. History of A fib  7. History of Sinus Note Dysfunction s/p dual chamber pacemaker since 2017  8. History of Hypothyroidism  9. History of T2DM    Discharge Diagnosis:  1. RADHA stage 3, most likely ischemic ATN  2. CKD stage 2 without proteinuria  3. History of HFpEF, EF 55-60%  4. History of HTN  5. History of HLD  6. History of OHS  7. History of A fibrillation, on Eliquis   8. History of Sinus Note Dysfunction s/p dual chamber pacemaker since 2017  9. History of Hypothyroidism  10. History of T2DM  11. History of normocytic anemia  12. History of vitamin D deficiency     Hospital Course:   Katalina Krause is a 74 y.o. male with PMHx of HFpEF, HTN, Obesity Hypoventilation Syndrome, AF, Sinus Note Dysfunction s/p dual chamber pacemaker since 2017, Hypothyroidism, Insulin-dependent DMT2, history of ischemic ATN for which he required HD treatment for 4 days with adequate renal function recovery in May 2017, who presented with anuria. Patient reported he last urinated on 3/9/2020 2 in the AM. The patient reported to having diarrhea 2 times daily for the past week while still taking his home medications.      Patient denied any history of urinary retention in the pas, any abdominal pain at this time, or any feeling of bladder fullness. Denied any chest pain, shortness of breath, palpitations, fever, chills, nausea, vomiting at this time. Denied any sick contacts. Denied any lower extremity edema or sacral edema at this time. No recent contrast imaging. Denied using NSAIDs. Denied any muscle cramps or pain. Home medications taken prior to admission include: bumex, lisinopril, metformin, metoprolol, cholecalciferol, amlodipine.  Patient reported he had been compliant with all his medications at home.      In the ED, patient vital signs stable. A post void residual was obtained in the ER, <50cc. Patient received 2L of NS in the ED. Labs significant to this admission include: potassium 5, BUN 63, creatinine 4.6, anion gap 17, proBNP 4751. Patient admitted for further management. Nephrology was consulted and IV fluid was continued. However, patient continued to be anuric. Ultrasound shows diffuse, bilateral renal cortical thinning. Due to persistent anuria despite fluid resuscitation, temporary HD catheter was placed and patient underwent HD for 2 days. Lisinopril and bumex was held. Patient has been hypotensive and amlodipine was held throughout the hospital course. Patient was given lokelma for hyperkalemia. Electrolytes, urine output and renal function were monitored while inpatient. On hospital day 4, patient's urine output increased and renal function started to improve. Patient was monitored off dialysis. Renal function continues to improve and patient maintains good urine output. No need for dialysis on reevaluation by nephrology. Temporary HD catheter was removed on day 6. Patient is doing well today. No new complaint. Cr improved to 2.8 with good urine output. Patient will be discharged home with follow up with PCP and nephrology. Instruct patient to hold lisinopril, bumex and metformin in light of impaired renal function. Will repeat BMP in a week and re-evaluate renal function and medications. Continue to hold amlodipine. BP need to be evaluated during hospital follow up to decided whether to resume amlodipine. Significant findings (history and exam, laboratory, radiological, pathology, other tests):   · General Appearance: alert, appears stated age and cooperative  · HEENT:  Head: Normal, normocephalic, atraumatic.   · Neck: no JVD and supple, symmetrical, trachea midline  · Lung: crackles noted on bilateral lower lobes  · Heart: DAILY  Qty: 90 tablet, Refills: 1    Associated Diagnoses: Type 2 diabetes mellitus with diabetic autonomic neuropathy, with long-term current use of insulin ([facility])      metoprolol succinate (TOPROL XL) 25 MG extended release tablet Take 1 tablet by mouth daily  Qty: 90 tablet, Refills: 1    Associated Diagnoses: Chronic heart failure with preserved ejection fraction ([address] Essential hypertension      diclofenac sodium (VOLTAREN) 1 % GEL Apply 2 g topically 4 times daily  Qty: 50 g, Refills: 1    Associated Diagnoses: Spasm of back muscles      Cholecalciferol (VITAMIN D3) 1000 units TABS TAKE TWO TABLETS BY MOUTH EVERY DAY  Qty: 30 tablet, Refills: 0      acetaminophen (TYLENOL) 500 MG tablet Take 1,000 mg by mouth daily as needed for Pain. Insulin Pen Needle (PEN NEEDLES) 32G X 6 MM MISC Take insulin daily  Qty: 100 each, Refills: 1    Associated Diagnoses: Type 2 diabetes mellitus with hyperglycemia, with long-term current use of insulin ([facility]      Lancets MISC Give Delica lancets. Tests twice a day A.M. And P.M  Qty: 200 each, Refills: 1    Associated Diagnoses: Type 2 diabetes mellitus with diabetic autonomic neuropathy, with long-term current use of insulin ([facility])      blood glucose test strips (ONETOUCH VERIO) strip TEST IN THE MORNING AND IN THE EVENING  Qty: 200 each, Refills: 1    Associated Diagnoses: Type 2 diabetes mellitus with diabetic autonomic neuropathy, with long-term current use of insulin ([facility])      Nebulizers (NEBULIZER COMPRESSOR) MISC Please provide patient with nebulizer and tubing needed to use equipment. Qty: 1 each, Refills: 0    Associated Diagnoses: NIKO (obstructive sleep apnea); Restrictive lung disease secondary to obesity; Class 1 obesity due to excess calories with serious comorbidity in adult, unspecified BMI; Lung nodules      Misc. Devices MISC Please add portability to current O2 order. Resp to evaluate patient for OCD device.   Qty: 1 each, Refills: 0    Associated Diagnoses: Chronic obstructive pulmonary disease, unspecified COPD type ([facility]      Misc. Devices KIT Dispense 1 blood pressure cuff. Qty: 1 kit, Refills: 0    Associated Diagnoses: Essential hypertension      OXYGEN Inhale 2.5 L into the lungs nightly  Qty: 1 Device, Refills: 99      zoster recombinant adjuvanted vaccine (SHINGRIX) 50 MCG/0.5ML SUSR injection 1 dose now and repeat in 2-6 months  Qty: 0.5 mL, Refills: 0         STOP taking these medications       amLODIPine (NORVASC) 5 MG tablet Comments:   Reason for Stopping:         bumetanide (BUMEX) 2 MG tablet Comments:   Reason for Stopping:         lisinopril (PRINIVIL;ZESTRIL) 40 MG tablet Comments:   Reason for Stopping:         metFORMIN (GLUCOPHAGE) 1000 MG tablet Comments:   Reason for Stopping:               Activity: activity as tolerated  Diet: renal diet    Follow-up appointments:   1. Hospital Follow up: At [facility] with House Team on 3/25/2022 at 11:30 AM.   Call to confirm appointment Tel: [phone] ([address]  Internal Medicine Clinic)     2. Please follow up with nephrology (Dr. Ze Martin). Please call and make an appointment in about a week. Humberto    Patient Instructions:   · Be compliant with your medications and take them as prescribed. · Please stop taking amlodipine, lisinopril, bumex and metformin. · Please monitor blood pressure at home. If blood pressure when standing is high, can start amlodipine. · Please complete blood test before your hospital follow up visit with your PCP and nephrology. Discuss whether it is ok to resume lisinopril, bumex and metformin during hospital follow up visit. · If you experience decreased urine output, worsening leg swelling, shortness of breath, etc, please call your doctor or get evaluated in the ED.       Chey Enrique MD  PGY 2   2:30 PM 3/16/2022

## 2022-03-16 NOTE — PROGRESS NOTES
Patient given discharge instructions, instructions explained to patient and spouse. Both verbalized understanding and denied questions. PIV removed, no complications.

## 2022-03-23 ENCOUNTER — HOSPITAL ENCOUNTER (OUTPATIENT)
Age: 67
Discharge: HOME | End: 2022-03-23
Payer: MEDICARE

## 2022-03-23 LAB
ANION GAP SERPL CALCULATED.3IONS-SCNC: 16 MMOL/L (ref 7–16)
BUN BLDV-MCNC: 27 MG/DL (ref 6–23)
CALCIUM SERPL-MCNC: 9 MG/DL (ref 8.6–10.2)
CHLORIDE BLD-SCNC: 101 MMOL/L (ref 98–107)
CO2: 20 MMOL/L (ref 22–29)
CREAT SERPL-MCNC: 1.9 MG/DL (ref 0.7–1.2)
GFR AFRICAN AMERICAN: 43
GFR NON-AFRICAN AMERICAN: 35 ML/MIN/1.73
GLUCOSE BLD-MCNC: 150 MG/DL (ref 74–99)
POTASSIUM SERPL-SCNC: 4.6 MMOL/L (ref 3.5–5)
SODIUM BLD-SCNC: 137 MMOL/L (ref 132–146)

## 2022-03-23 PROCEDURE — 80048 BASIC METABOLIC PNL TOTAL CA: CPT

## 2022-03-23 PROCEDURE — 36415 COLL VENOUS BLD VENIPUNCTURE: CPT

## 2022-03-25 ENCOUNTER — OFFICE VISIT (OUTPATIENT)
Dept: INTERNAL MEDICINE | Age: 67
End: 2022-03-25
Payer: MEDICARE

## 2022-03-25 VITALS
DIASTOLIC BLOOD PRESSURE: 65 MMHG | OXYGEN SATURATION: 98 % | WEIGHT: 234.1 LBS | RESPIRATION RATE: 16 BRPM | SYSTOLIC BLOOD PRESSURE: 138 MMHG | BODY MASS INDEX: 36.74 KG/M2 | HEIGHT: 67 IN | HEART RATE: 74 BPM | TEMPERATURE: 97.8 F

## 2022-03-25 DIAGNOSIS — N17.0 ACUTE KIDNEY INJURY (AKI) WITH ACUTE TUBULAR NECROSIS (ATN) (HCC): Primary | ICD-10-CM

## 2022-03-25 PROCEDURE — 99213 OFFICE O/P EST LOW 20 MIN: CPT | Performed by: INTERNAL MEDICINE

## 2022-03-25 PROCEDURE — 1111F DSCHRG MED/CURRENT MED MERGE: CPT | Performed by: INTERNAL MEDICINE

## 2022-03-25 PROCEDURE — 99495 TRANSJ CARE MGMT MOD F2F 14D: CPT | Performed by: INTERNAL MEDICINE

## 2022-03-25 NOTE — LETTER
[facility] Internal Medicine  [address] [place] [address]  Phone: [phone]  Fax: [phone]    Loretta Bañuelos MD         March 25, 2022     Patient: Anabella Rocha   YOB: 1955   Date of Visit: 3/25/2022       To Whom It May Concern: It is my medical opinion that Karen Wilkerson requires a disability parking placard for the following reasons:  He cannot walk 200 feet without stopping to rest.  Duration of need: 5 year    If you have any questions or concerns, please don't hesitate to call.     Sincerely,        Loretta Bañuelos MD

## 2022-03-25 NOTE — PROGRESS NOTES
All instructions given to patient by [facility]   printed avs and parking letter given to patient  Fu pcp appointment already scheduled

## 2022-03-25 NOTE — PROGRESS NOTES
Post-Discharge Transitional Care  Follow Up      Jackelin Rocha   YOB: 1955    Date of Office Visit:  3/25/2022  Date of Hospital Admission: 3/10/22  Date of Hospital Discharge: 3/16/22  Risk of hospital readmission (high >=14%.  Medium >=10%) :Readmission Risk Score: 17.7 ( )    Care management risk score Rising risk (score 2-5) and Complex Care (Scores >=6): 7     Non face to face  following discharge, date last encounter closed (first attempt may have been earlier): *No documented post hospital discharge outreach found in the last 14 days    Call initiated 2 business days of discharge: *No response recorded in the last 14 days    ASSESSMENT/PLAN:   RADHA stage III on CKD 2 without proteinuria  -hx of ischemic ATN s/p HD x 4 days in May 2017  -admitted 3/10-3/16 for anuria, crea peak at 5.8 despite fluid resuscitation  -retroperitoneal US: diffuse, bilateral renal cortical thinning   -s/p HD x 2 days for anuria and hyperkalemia (5.6)  -lisinopril, bumetanide, metformin, amlodipine on hold  -BP in the clinic today :138/65 mmHg  -CMP 3/23/22: 1.9 fr 2.8 upon discharge, GFR 35, K 4.6 Na 137  -urinates 5-7x/ day  Plan  -follow with nephrology  -continue holding lisinopril, bumetanide    HFpEF EF 55-60%  -SOB better, leg swelling improved  -currently on metoprolol succinate 25 mg daily  -continue holding lisinopril, bumetanide  -follow with cardiology    HTN  -BP in the clinic today :138/65 mmHg  -denies lightheadedness, dizziness at home  -continue to hold amlodipine    HLD  -continue atorvastatin 40 mg daily    Hx atrial fibrillation s/p pacemaker placement  -on regular rhythm, HR 74 bpm  -denies palpitations, dyspnea, chest pain at home  Plan  -continue on apixaban 5 mg BID and metoprolol succinate 25 mg daily    Asthma  -asymptomatic  -continue tiotropium daily, mometasone-formoterol 2 puffs daily    NIKO  -not on CPAP  -uses 4 LPM/NC at HS    DM2  -BG this morning 137 mg/dL  -3/11/22: HBA1C: 7  -no s/sx of hypoglycemia at home  Plan  -continue insulin glargine 35 units daily  -discontinue metformin    Hypothyroidism  -continue levothyroxine 88 mcg daily    Medical Decision Making: straightforward  No follow-ups on file. On this date 3/25/2022 I have spent 15 minutes reviewing previous notes, test results and face to face with the patient discussing the diagnosis and importance of compliance with the treatment plan as well as documenting on the day of the visit. Subjective:   HPI:  Follow up of Hospital problems/diagnosis(es):    as above    Inpatient course: Discharge summary reviewed- see chart. Interval history/Current status:   Patient states he is feeling better. SOB at baseline and leg swelling improving. Denies chest pain, PNDs, lightheadedness, dizziness, abdominal pain, nausea, vomiting, diarrhea, constipation, numbness, weakness. , muscle cramps.     Patient Active Problem List   Diagnosis    DM (diabetes mellitus) ([address]    Class 2 obesity due to excess calories with serious comorbidity and body mass index (BMI) of 38.0 to 38.9 in adult    Hyperlipidemia    Essential hypertension    Hypothyroidism    Lung nodules    Arthritis of shoulder region, left    OA (osteoarthritis of the spine)    S/P laminectomy with spinal fusion    Colorectal polyps    Diverticula of colon    NIKO (obstructive sleep apnea)    Hypoxemia    Restrictive lung disease secondary to obesity    Atypical atrial flutter (HCC)    Sinus node dysfunction (HCC)    Pacemaker    Persistent atrial fibrillation ([address]    Diabetes mellitus type 2 in obese (HCC)    Obesity    Chronic obstructive pulmonary disease, unspecified COPD type ([address]    Acute heart failure with preserved ejection fraction (HFpEF) ([address]    Chronic hypercapnic respiratory failure ([address]    RADHA (acute kidney injury) ([address]    Acute kidney injury ([address]    Encounter regarding vascular access for dialysis for ESRD ([address]       Medications listed as ordered at the time of discharge from hospital      Medications marked \"taking\" at this time  Outpatient Medications Marked as Taking for the 3/25/22 encounter (Office Visit) with Glynn Prakinson MD   Medication Sig Dispense Refill    insulin glargine (BASAGLAR KWIKPEN) 100 UNIT/ML injection pen Inject 35 Units into the skin every morning PAP Medication 15 pen 3    levothyroxine (SYNTHROID) 88 MCG tablet Take 1 tablet by mouth Daily 90 tablet 1    mometasone-formoterol (DULERA) 200-5 MCG/ACT inhaler Inhale 2 puffs into the lungs 2 times daily Rinse mouth after using. PAP medication. 3 each 1    aspirin (ASPIR-LOW) 81 MG EC tablet TAKE ONE TABLET BY MOUTH EVERY DAY 90 tablet 1    Insulin Pen Needle (PEN NEEDLES) 32G X 6 MM MISC Take insulin daily 100 each 1    Lancets MISC Give Delica lancets. Tests twice a day A.M. And P.M 200 each 1    blood glucose test strips (ONETOUCH VERIO) strip TEST IN THE MORNING AND IN THE EVENING 200 each 1    tiotropium (SPIRIVA HANDIHALER) 18 MCG inhalation capsule Inhale 1 capsule into the lungs daily 30 capsule 12    ELIQUIS 5 MG TABS tablet Take 1 tablet by mouth 2 times daily 180 tablet 1    atorvastatin (LIPITOR) 40 MG tablet TAKE ONE TABLET BY MOUTH DAILY 90 tablet 1    metoprolol succinate (TOPROL XL) 25 MG extended release tablet Take 1 tablet by mouth daily 90 tablet 1    diclofenac sodium (VOLTAREN) 1 % GEL Apply 2 g topically 4 times daily 50 g 1    Misc. Devices MISC Please add portability to current O2 order. Resp to evaluate patient for OCD device. 1 each 0    Misc. Devices KIT Dispense 1 blood pressure cuff. 1 kit 0    OXYGEN Inhale 2.5 L into the lungs nightly 1 Device 99    Cholecalciferol (VITAMIN D3) 1000 units TABS TAKE TWO TABLETS BY MOUTH EVERY DAY 30 tablet 0    acetaminophen (TYLENOL) 500 MG tablet Take 1,000 mg by mouth daily as needed for Pain.           Medications patient taking as of now reconciled against medications ordered at time of hospital discharge: Yes    A comprehensive review of systems was negative except for what was noted in the HPI. Objective: There were no vitals taken for this visit. General Appearance: alert and oriented to person, place and time, well developed and well- nourished, in no acute distress  Skin: warm and dry, no rash or erythema  Head: normocephalic and atraumatic  Eyes: pupils equal, round, and reactive to light, extraocular eye movements intact, conjunctivae normal  ENT: tympanic membrane, external ear and ear canal normal bilaterally, nose without deformity, nasal mucosa and turbinates normal without polyps  Neck: supple and non-tender without mass, no thyromegaly or thyroid nodules, no cervical lymphadenopathy  Pulmonary/Chest: clear to auscultation bilaterally- no wheezes, rales or rhonchi, normal air movement, no respiratory distress  Cardiovascular: normal rate, regular rhythm, normal S1 and S2, no murmurs, rubs, clicks, or gallops, distal pulses intact, no carotid bruits  Abdomen: soft, non-tender, non-distended, normal bowel sounds, no masses or organomegaly  Extremities: no cyanosis, (+) 1-2 B LES edema, with compression stockings  Musculoskeletal: normal range of motion, no joint swelling, deformity or tenderness  Neurologic: reflexes normal and symmetric, no cranial nerve deficit, gait, coordination and speech normal      An electronic signature was used to authenticate this note.   --Adeline Vickers MD

## 2022-03-25 NOTE — PROGRESS NOTES
[facility]    Attending Physician Statement  I have discussed the case, including pertinent history and exam findings with the resident physician. I have seen and examined the patient and the key elements of the encounter have been performed by me. I agree with the assessment, plan and orders as documented by the resident. I have reviewed all pertinent PMHx, PSHx, FamHx, SocialHx, medications, and allergies and updated history as appropriate. Patient presents for hospital follow up appointment. Admitted to [facility] 3/10/22 -- 3/16/22 with being anuric for 24 hours. ED assessment found to have acute renal failure requiring 2 sessions of HD. ARF due to poor oral intake, severe dehydration with concurrent diuretic and ACEi (which remain on hold). He feels symptomatically improved from discharge with decreased swelling. Cr down trending from 2.8 --> 1.9 with 5-7 urine outputs daily. Continue current meds and nephro follow up next week. As Cr improves anticipate restart ACEi +/- diuretic for HFpEF. Remainder of medical problems as per resident note.     Sigrid Amin DO  3/25/2022 11:47 AM

## 2022-04-04 ENCOUNTER — OFFICE VISIT (OUTPATIENT)
Dept: SURGERY | Age: 67
End: 2022-04-04
Payer: MEDICARE

## 2022-04-04 VITALS
TEMPERATURE: 97.2 F | HEIGHT: 67 IN | WEIGHT: 234 LBS | HEART RATE: 77 BPM | DIASTOLIC BLOOD PRESSURE: 64 MMHG | BODY MASS INDEX: 36.73 KG/M2 | SYSTOLIC BLOOD PRESSURE: 139 MMHG | RESPIRATION RATE: 16 BRPM | OXYGEN SATURATION: 94 %

## 2022-04-04 DIAGNOSIS — Z91.89 AT RISK FOR COLON CANCER: Primary | ICD-10-CM

## 2022-04-04 PROCEDURE — 99202 OFFICE O/P NEW SF 15 MIN: CPT | Performed by: SURGERY

## 2022-04-04 PROCEDURE — 99999 PR OFFICE/OUTPT VISIT,PROCEDURE ONLY: CPT | Performed by: SURGERY

## 2022-04-04 ASSESSMENT — ENCOUNTER SYMPTOMS
DIARRHEA: 0
ANAL BLEEDING: 0
CONSTIPATION: 0
VOMITING: 0
BLOOD IN STOOL: 0
WHEEZING: 1
NAUSEA: 0
EYES NEGATIVE: 1
CHOKING: 0
SHORTNESS OF BREATH: 1
COUGH: 0
ABDOMINAL DISTENTION: 0
COLOR CHANGE: 0
ABDOMINAL PAIN: 0
CHEST TIGHTNESS: 0
BACK PAIN: 1

## 2022-04-04 NOTE — PATIENT INSTRUCTIONS
Call [phone] for any questions/concerns. Patient Information and Instructions for Colonoscopy         Definition of Colonoscopy   A colonoscopy is the visual exam of the rectum and colon (large intestine). The exam is done with a tool called a colonoscope. The colonoscope is a flexible tube with a tiny camera on the end. This instrument allows the doctor to view the inside of your rectum and colon. Sigmoidoscopy is a shorter scope that views only the last one third of the colon. Reasons for Colonoscopy   It is used to examine, diagnose, and treat problems in your large intestine. The procedure is most often done for the following reasons: To determine the cause of abdominal pain, rectal bleeding, or a change in bowel habits   To detect and treat colon cancer or colon polyps   To obtain tissue samples for testing   To stop intestinal bleeding   Monitor response to treatment if you have inflammatory bowel disease     Possible Complications   Complications are rare, but no procedure is completely free of risk. If you are planning to have a colonoscopy, your doctor will review a list of possible complications, which may include:   Bleeding   Reaction to the sedation causing drop in your blood pressure or problems breathing  Perforation or puncture of the bowel     Factors that may increase the risk of complications include:   Pre-existing heart or kidney condition   Treatment with certain medicines, including aspirin and other drugs with anticoagulant or blood-thinning properties   Prior abdominal surgery or radiation treatments   Active colitis , diverticulitis , or other acute bowel disease   Previous treatment with radiation therapy     Be sure to discuss these risks with your doctor before the procedure.      What to Expect   Prior to Procedure   Your doctor will likely do the following:   Physical exam   Health history   Review of medicines   Test your stool for hidden blood (called \"occult blood\") Your colon must be completely clean before the procedure. Any stool left in the intestine will block the view. This preparation may start several days before the procedure. Follow your doctor's instructions. Leading up to your procedure:   Talk to your doctor about your medicines. You may be asked to stop taking some medicines up to one week before the procedure, like:   Anti-inflammatory drugs (e.g., aspirin )   Blood thinners like clopidogrel (Plavix) or warfarin (Coumadin)   Iron supplements or vitamins containing iron   The day or days before your procedure, go on a clear liquid diet (clear broth, clear juice, clear jello) with no red coloring  Do not eat or drink anything after midnight. Wear comfortable clothing. If you have diabetes, ask your doctor if you need to adjust your diabetes medicine on the day prior to your procedure and the day of your procedure. Arrange for a ride home after the procedure. Anesthesia   You will receive intravenous sedation medicine for the procedure so you will not feel anything during the procedure. Description of the Procedure   You will lie on your left side with knees bent and drawn up toward your chest. The colonoscope will be slowly inserted through the rectum and into the bowel. The colonoscope will inject air into the colon. A small attached video camera will allow the doctor to view the colon's lining on a screen. The doctor will continue guiding the tool through the bowel and assess the lining. A tissue sample or polyps may be removed during the procedure. How Long Will It Take? Usually it takes about 30 to 45 minutes     Will It Hurt? Most people do not feel anything during the procedure and will not remember the procedure. After the procedure, gas pains and cramping are common. These pains should go away with the passing of gas. Post-procedure Care   If any tissue was removed: It will be sent to a lab to be examined.  It may take 1-2 weeks for results. The doctor will usually give an initial report after the scope is removed. Other tests may be recommended. A small amount of bleeding may occur during the first few days after the procedure. When you return home after the procedure, be sure to follow your doctor's instructions, which may include:   Resume medicines as instructed by your doctor. Resume normal diet, unless directed otherwise by your doctor. The sedative will make you drowsy. Avoid driving, operating machinery, or making important decisions for the rest of the day. Rest for the remainder of the day. After arriving home, contact your doctor if any of the following occurs:   Bleeding from your rectum, notify your doctor if you pass a teaspoonful of blood or more. Black, tarry stools   Severe abdominal pain   Hard, swollen abdomen   Signs of infection, including fever or chills   Inability to pass gas or stool   Coughing, shortness of breath, chest pain, severe nausea or vomiting     In case of an emergency, CALL 911 . GATORADE COLONOSCOPY PREPARATION     **No aspirin, aspirin by-products or plavix for 1 week prior to your colonscopy. No coumadin for 5 days or check with your physician who orders the coumadin. Please contact the physican that prescribed the asprin, plavix, and coumadin to see if this is acceptable. **    Purchase these over the counter laxatives:  1. GATORADE (64 ounces) of lemonade or other clear Gatorade (two 32 Oz. bottles)  2. DULCOLAX 5 mg tablets (four tablets)  3. MIRALAX BOTTLE 238 grams (over the counter only)    The DAY BEFORE your colonoscopy:   Drink only clear liquids. (Absolutely no solid food)    Examples of clear liquids: Water, clear fruit juices such as apple or white grape, chicken or beef bouillion, jello (no RED or PURPLE), clear Gatorade, popsicles (no RED or PURPLE), clear soft drinks, coffee without cream or sugar. NO MILK OR MILK PRODUCTS. NO ORANGE JUICE.  NO RED OR PURPLE JELLO OR JUICES. 3 PM: Take 2 DULCOLAX tablets    5 PM: Mix the entire bottle of MIRALAX into the 64 ounces of GATORADE. (Put half the bottle in each 32 ounce bottle). Shake the solution until fully dissolved. Drink an 8 ounce glass every 30 minutes until the solution is gone. 7 PM: Take the last 2 DULCOLAX tablets. DO NOT EAT OR DRINK ANYTHING AFTER MIDNIGHT     The DAY OF your colonoscopy: You may take any necessary medications with a sip of water. Bring along someone to take you home. REMEMBER: The preparation is very important. An adequate clean out allows for the best evaluation of your entire colon. During the prep, using baby wipes may ease some of your discomfort. You should NOT plan on working or driving the rest of the day due to sedation given at the procedure.

## 2022-04-04 NOTE — PROGRESS NOTES
Subjective:      Patient ID: Jxa Phillips is a 74 y.o. male. HPI  74 yr old male here for colonoscopy eval.  Last colonoscopy was 2012--hyperplastic polyp. Denies abd pain, change in bowel habits, blood in stool, unintentional weight loss, or family hx of colon cancer. Past Medical History:   Diagnosis Date    Atrial fibrillation ([facility] [address]     Carpal tunnel syndrome     Encounter regarding vascular access for dialysis for ESRD ([facility] [address] 3/12/2022    Hyperlipidemia     Hypertension     Hypothyroidism     Lung nodule     Nocturnal hypoxemia due to obesity 8/8/2013    Obesity     NIKO [facility]obstructive sleep apnea) [facility]/8/[facility]    [facility]    Osteoarthritis     Pinched nerve     Lumbar    Restrictive lung disease secondary to obesity 7/25/2016    Sinus node dysfunction (HCC)     Type II or unspecified type diabetes mellitus without mention of complication, not stated as uncontrolled        Past Surgical History:   Procedure Laterality Date    BACK SURGERY  2012    [facility]. Pinched nerve in back    BACK SURGERY  05/30/2017    COLONOSCOPY  2007    multiple colon polyps    COLONOSCOPY  06/04/12    COLONOSCOPY    EYE SURGERY      lennie cataracts implant    HERNIA REPAIR      umbilical    PACEMAKER PLACEMENT  10/03/2017    [facility]van[facility] dual chamber    Dr. Clinton Patricia Right        Current Outpatient Medications   Medication Sig Dispense Refill    Ferrous Gluconate-C-Folic Acid (IRON-C PO) Take by mouth      insulin glargine (BASAGLAR KWIKPEN) 100 UNIT/ML injection pen Inject 35 Units into the skin every morning PAP Medication 15 pen 3    levothyroxine (SYNTHROID) 88 MCG tablet Take 1 tablet by mouth Daily 90 tablet 1    mometasone-formoterol (DULERA) 200-5 MCG/ACT inhaler Inhale 2 puffs into the lungs 2 times daily Rinse mouth after using. PAP medication.  3 each 1    aspirin (ASPIR-LOW) 81 MG EC tablet TAKE ONE TABLET BY MOUTH EVERY DAY 90 tablet 1    Insulin Pen Needle (PEN NEEDLES) 32G X 6 MM MISC Take insulin daily 100 each 1    Lancets MISC Give Delica lancets. Tests twice a day A.M. And P.M 200 each 1    blood glucose test strips (ONETOUCH VERIO) strip TEST IN THE MORNING AND IN THE EVENING 200 each 1    tiotropium (SPIRIVA HANDIHALER) 18 MCG inhalation capsule Inhale 1 capsule into the lungs daily 30 capsule 12    ELIQUIS 5 MG TABS tablet Take 1 tablet by mouth 2 times daily 180 tablet 1    atorvastatin (LIPITOR) 40 MG tablet TAKE ONE TABLET BY MOUTH DAILY 90 tablet 1    metoprolol succinate (TOPROL XL) 25 MG extended release tablet Take 1 tablet by mouth daily 90 tablet 1    diclofenac sodium (VOLTAREN) 1 % GEL Apply 2 g topically 4 times daily 50 g 1    Nebulizers (NEBULIZER COMPRESSOR) MISC Please provide patient with nebulizer and tubing needed to use equipment. 1 each 0    Misc. Devices MISC Please add portability to current O2 order. Resp to evaluate patient for OCD device. 1 each 0    Misc. Devices KIT Dispense 1 blood pressure cuff. 1 kit 0    OXYGEN Inhale 2.5 L into the lungs nightly 1 Device 99    Cholecalciferol (VITAMIN D3) 1000 units TABS TAKE TWO TABLETS BY MOUTH EVERY DAY 30 tablet 0    zoster recombinant adjuvanted vaccine (SHINGRIX) 50 MCG/0.5ML SUSR injection 1 dose now and repeat in 2-6 months 0.5 mL 0    acetaminophen (TYLENOL) 500 MG tablet Take 1,000 mg by mouth daily as needed for Pain. No current facility-administered medications for this visit.        No Known Allergies    Family History   Problem Relation Age of Onset    Cancer Mother         skin    Heart Disease Mother     High Blood Pressure Father     Amyotrophic lateral sclerosis Father     Cancer Brother     High Blood Pressure Brother     Diabetes Brother        Social History     Socioeconomic History    Marital status:      Spouse name: Not on file    Number of children: Not on file    Years of education: Not on file    Highest education level: Not on file   Occupational History    Not on file   Tobacco Use    Smoking status: Former Smoker     Packs/day: 0.10     Years: 35.00     Pack years: 3.50     Types: Cigarettes     Quit date: 2004     Years since quittin.3    Smokeless tobacco: Former User   Vaping Use    Vaping Use: Never used   Substance and Sexual Activity    Alcohol use: No     Alcohol/week: 0.0 standard drinks    Drug use: No    Sexual activity: Yes     Partners: Female   Other Topics Concern    Not on file   Social History Narrative    Not on file     Social Determinants of Health     Financial Resource Strain: High Risk    Difficulty of Paying Living Expenses: Very hard   Food Insecurity: No Food Insecurity    Worried About Running Out of Food in the Last Year: Never true    Blaise of Food in the Last Year: Never true   Transportation Needs: No Transportation Needs    Lack of Transportation (Medical): No    Lack of Transportation (Non-Medical):  No   Physical Activity: Inactive    Days of Exercise per Week: 0 days    Minutes of Exercise per Session: 0 min   Stress:     Feeling of Stress : Not on file   Social Connections:     Frequency of Communication with Friends and Family: Not on file    Frequency of Social Gatherings with Friends and Family: Not on file    Attends Cheondoism Services: Not on file    Active Member of Clubs or Organizations: Not on file    Attends Club or Organization Meetings: Not on file    Marital Status: Not on file   Intimate Partner Violence:     Fear of Current or Ex-Partner: Not on file    Emotionally Abused: Not on file    Physically Abused: Not on file    Sexually Abused: Not on file   Housing Stability: [address] Unable to Pay for Housing in the Last Year: No    Number of [place] in the Last Year: 1    Unstable Housing in the Last Year: No     Review of Systems   Constitutional: Negative for activity change, appetite change, chills, fever and unexpected weight change. HENT: Negative. Eyes: Negative. Respiratory: Positive for shortness of breath and wheezing. Negative for cough, choking and chest tightness. Cardiovascular: Positive for leg swelling. Negative for chest pain and palpitations. Gastrointestinal: Negative for abdominal distention, abdominal pain, anal bleeding, blood in stool, constipation, diarrhea, nausea and vomiting. Endocrine: Negative for cold intolerance, heat intolerance, polydipsia and polyuria. Genitourinary: Negative for dysuria, frequency, hematuria, penile discharge, penile pain, penile swelling, scrotal swelling, testicular pain and urgency. Musculoskeletal: Positive for arthralgias, back pain, gait problem, neck pain and neck stiffness. Negative for joint swelling and myalgias. Skin: Negative for color change, pallor, rash and wound. Allergic/Immunologic: Negative for environmental allergies and food allergies. Neurological: Positive for weakness. Negative for dizziness, seizures, syncope, light-headedness, numbness and headaches. Hematological: Negative for adenopathy. Does not bruise/bleed easily. Psychiatric/Behavioral: Negative for agitation, confusion, decreased concentration, hallucinations, self-injury and suicidal ideas. The patient is not nervous/anxious and is not hyperactive. Objective:   Physical Exam  Constitutional:       General: He is not in acute distress. Appearance: Normal appearance. He is well-developed. He is not ill-appearing, toxic-appearing or diaphoretic. HENT:      Head: Normocephalic and atraumatic. Mouth/Throat:      Mouth: Mucous membranes are moist.      Pharynx: Oropharynx is clear. Eyes:      General: No scleral icterus. Right eye: No discharge. Left eye: No discharge. Extraocular Movements: Extraocular movements intact. Pupils: Pupils are equal, round, and reactive to light. Cardiovascular:      Rate and Rhythm: Normal rate.  Rhythm irregular. Heart sounds: Normal heart sounds. No murmur heard. Pulmonary:      Effort: Pulmonary effort is normal. No respiratory distress. Breath sounds: Normal breath sounds. No stridor. No wheezing, rhonchi or rales. Abdominal:      General: Bowel sounds are normal. There is no distension. Palpations: Abdomen is soft. There is no mass. Tenderness: There is no abdominal tenderness. There is no guarding or rebound. Hernia: No hernia is present. Musculoskeletal:         General: Normal range of motion. Cervical back: Normal range of motion and neck supple. Skin:     General: Skin is warm and dry. Neurological:      General: No focal deficit present. Mental Status: He is alert and oriented to person, place, and time. Psychiatric:         Mood and Affect: Mood normal.         Behavior: Behavior normal.         Thought Content: Thought content normal.         Judgment: Judgment normal.         Assessment:      At risk for colon cancer secondary to age      Plan:      Recommend colonoscopy. The patient was explained the risks/benefits/alternatives/expected outcomes of the procedure. The patient was explained the risks of the procedure, including, but not limited to, the risk of reaction to the anesthesia medicine and the risk of perforation requiring further surgery. The patient was informed that they may require biopsy or polypectomy. These procedures may increase the risk of complication. All questions were answered. The patient verbalized understanding and agreed to proceed.           Lady Marcy MD

## 2022-04-04 NOTE — LETTER
[facility] General Surgery  [address] [place] [id number]  Phone: [phone]  Fax: [phone]           Yessica Kwan MD      April 4, 2022     Patient: Jamilah Sheppard   MR Number: [id number]   YOB: 1955   Date of Visit: 4/4/2022       Dear Dr. Zuhair Appiah:    Thank you for referring Dior Hoover to me for evaluation/treatment. Below are the relevant portions of my assessment and plan of care. At risk for colon cancer secondary to age    Recommend colonoscopy. The patient was explained the risks/benefits/alternatives/expected outcomes of the procedure. The patient was explained the risks of the procedure, including, but not limited to, the risk of reaction to the anesthesia medicine and the risk of perforation requiring further surgery. The patient was informed that they may require biopsy or polypectomy. These procedures may increase the risk of complication. All questions were answered. The patient verbalized understanding and agreed to proceed. If you have questions, please do not hesitate to call me. I look forward to following Roosevelt Hamman along with you.     Sincerely,        Yessica Kwan MD    CC providers:  Joanne Romero, [address] On license of [facility]  [address]

## 2022-04-05 ENCOUNTER — TELEPHONE (OUTPATIENT)
Dept: SURGERY | Age: 67
End: 2022-04-05

## 2022-04-05 NOTE — TELEPHONE ENCOUNTER
Prior Authorization Form:      DEMOGRAPHICS:                     Patient Name:  Angelica Marroquin  Patient :  1955            Insurance:  Payor: Amanda Spain / Plan: Shane Elaine ESSENTIAL/PLUS / Product Type: *No Product type* /   Insurance ID Number:    Payor/Plan Subscr  Sex Relation Sub. Ins. ID Effective Group Num   1.  BCBS MEDICAREM[place]ed [place] 1955 Male Self [id number] [facility]                                    [id number]         DIAGNOSIS & PROCEDURE:                       Procedure/Operation: COLONOSCOPY           CPT Code: 89770    Diagnosis:  AT RISK FOR COLON CANCER    ICD10 Code: Z91.89    Location:  [facility]    Surgeon:  DR. Sylwia Cyr    SCHEDULING INFORMATION:                          Date: 22    Time: 11:30AM              Anesthesia:  LMAC                                                       Status:  OUTPATIENT        Special Comments:  N/A      Electronically signed by Dee Dee Charles MA on 2022 at 12:39 PM

## 2022-04-12 ENCOUNTER — HOSPITAL ENCOUNTER (OUTPATIENT)
Age: 67
Discharge: HOME | End: 2022-04-12
Payer: MEDICARE

## 2022-04-12 LAB
ALBUMIN SERPL-MCNC: 4 G/DL (ref 3.5–5.2)
ALP BLD-CCNC: 122 U/L (ref 40–129)
ALT SERPL-CCNC: 15 U/L (ref 0–40)
ANION GAP SERPL CALCULATED.3IONS-SCNC: 16 MMOL/L (ref 7–16)
AST SERPL-CCNC: 12 U/L (ref 0–39)
BASOPHILS ABSOLUTE: 0.08 E9/L (ref 0–0.2)
BASOPHILS RELATIVE PERCENT: 1.1 % (ref 0–2)
BILIRUB SERPL-MCNC: 0.7 MG/DL (ref 0–1.2)
BUN BLDV-MCNC: 15 MG/DL (ref 6–23)
CALCIUM SERPL-MCNC: 8.9 MG/DL (ref 8.6–10.2)
CHLORIDE BLD-SCNC: 102 MMOL/L (ref 98–107)
CO2: 21 MMOL/L (ref 22–29)
CREAT SERPL-MCNC: 1 MG/DL (ref 0.7–1.2)
CREATININE URINE: 65 MG/DL (ref 40–278)
EOSINOPHILS ABSOLUTE: 0.4 E9/L (ref 0.05–0.5)
EOSINOPHILS RELATIVE PERCENT: 5.6 % (ref 0–6)
GFR AFRICAN AMERICAN: >60
GFR NON-AFRICAN AMERICAN: >60 ML/MIN/1.73
GLUCOSE BLD-MCNC: 201 MG/DL (ref 74–99)
HCT VFR BLD CALC: 32.2 % (ref 37–54)
HEMOGLOBIN: 9.9 G/DL (ref 12.5–16.5)
IMMATURE GRANULOCYTES #: 0.12 E9/L
IMMATURE GRANULOCYTES %: 1.7 % (ref 0–5)
LYMPHOCYTES ABSOLUTE: 1.2 E9/L (ref 1.5–4)
LYMPHOCYTES RELATIVE PERCENT: 16.8 % (ref 20–42)
MAGNESIUM: 1.3 MG/DL (ref 1.6–2.6)
MCH RBC QN AUTO: 29.3 PG (ref 26–35)
MCHC RBC AUTO-ENTMCNC: 30.7 % (ref 32–34.5)
MCV RBC AUTO: 95.3 FL (ref 80–99.9)
MICROALBUMIN UR-MCNC: 76 MG/L
MICROALBUMIN/CREAT UR-RTO: 116.9 (ref 0–30)
MONOCYTES ABSOLUTE: 0.46 E9/L (ref 0.1–0.95)
MONOCYTES RELATIVE PERCENT: 6.4 % (ref 2–12)
NEUTROPHILS ABSOLUTE: 4.88 E9/L (ref 1.8–7.3)
NEUTROPHILS RELATIVE PERCENT: 68.4 % (ref 43–80)
PDW BLD-RTO: 15.5 FL (ref 11.5–15)
PHOSPHORUS: 3.1 MG/DL (ref 2.5–4.5)
PLATELET # BLD: 226 E9/L (ref 130–450)
PMV BLD AUTO: 9.4 FL (ref 7–12)
POTASSIUM SERPL-SCNC: 4.3 MMOL/L (ref 3.5–5)
PROTEIN PROTEIN: 20 MG/DL (ref 0–12)
PROTEIN/CREAT RATIO: 0.3
PROTEIN/CREAT RATIO: 0.3 (ref 0–0.2)
RBC # BLD: 3.38 E12/L (ref 3.8–5.8)
SODIUM BLD-SCNC: 139 MMOL/L (ref 132–146)
TOTAL PROTEIN: 7.7 G/DL (ref 6.4–8.3)
URIC ACID, SERUM: 7.8 MG/DL (ref 3.4–7)
WBC # BLD: 7.1 E9/L (ref 4.5–11.5)

## 2022-04-12 PROCEDURE — 84156 ASSAY OF PROTEIN URINE: CPT

## 2022-04-12 PROCEDURE — 83735 ASSAY OF MAGNESIUM: CPT

## 2022-04-12 PROCEDURE — 84100 ASSAY OF PHOSPHORUS: CPT

## 2022-04-12 PROCEDURE — 82044 UR ALBUMIN SEMIQUANTITATIVE: CPT

## 2022-04-12 PROCEDURE — 85025 COMPLETE CBC W/AUTO DIFF WBC: CPT

## 2022-04-12 PROCEDURE — 36415 COLL VENOUS BLD VENIPUNCTURE: CPT

## 2022-04-12 PROCEDURE — 82570 ASSAY OF URINE CREATININE: CPT

## 2022-04-12 PROCEDURE — 84550 ASSAY OF BLOOD/URIC ACID: CPT

## 2022-04-12 PROCEDURE — 80053 COMPREHEN METABOLIC PANEL: CPT

## 2022-04-20 NOTE — PROGRESS NOTES
[address] PRE-ADMISSION TESTING GENERAL INSTRUCTIONS- [facility]-phone number:[phone]    GENERAL INSTRUCTIONS  [x] Antibacterial Soap shower Night before and/or AM of Surgery  [x]Follow bowel prep instructions per surgeon. No liquids/jello that are red or purple color. [x] Nothing by mouth after midnight, including gum, candy, mints, or water. [x] You may brush your teeth, gargle, but do NOT swallow water. [x]No smoking, chewing tobacco, illegal drugs, marijuana or alcohol within 24 hours of your surgery. [x] Jewelry, valuables or body piercing's should not be brought to the hospital. All body and/or tongue piercing's must be removed prior to arriving to hospital.  ALL hair pins must be removed. [x] Do not wear makeup, lotions, powders, deodorant. Nail polish as directed by the nurse. [x] Arrange transportation with a responsible adult  to and from the hospital. If you do not have a responsible adult  to transport you, you will need to make arrangements with a medical transportation company (i.e. [employer]. A Uber/taxi/bus is not appropriate unless you are accompanied by a responsible adult ). Arrange for someone to be with you for the remainder of the day and for 24 hours after your procedure due to having had anesthesia. Who will be your  for transportation? Petra, spouse  Who will be staying with you for 24 hrs after your procedure? Petra, spouse  [x] Intrinsic-ID card and photo ID. [x] Use inhalers the morning of surgery and bring your emergency inhaler with you to hospital.       PARKING INSTRUCTIONS:   [x] Arrival Time: 10:30 am,    Two people may accompany you. Everyone will need to wear a mask. · [x] Parking lot '\"I\"  is located on [facility] (the corner of [facility]). To enter, press the button and the gate will lift. A free token will be provided to exit the lot.    One car per patient is allowed to park in this lot. All other cars are to park on [address] either in the parking garage or the handicap lot. Walk up the front walk to the [facility], the door will be locked an employee will greet you and let you in. EDUCATION INSTRUCTIONS:        [x]Pain: Post-op pain is normal and to be expected. You will be asked to rate your pain from 0-10 (a zero is not acceptable-education is needed). Your post-op pain goal is:   [x] Ask your nurse for your pain medication. [x] Wear loose comfortable clothing      MEDICATION INSTRUCTIONS:  [x]Bring a complete list of your medications, please write the last time you took the medicine, give this list to the nurse. [x] Take the following medications the morning of surgery with 1-2 ounces of water:  Metoprolol succinate        [x] Stop herbal supplements, ibuprofen (Nsaids)  and vitamins 5 days before your surgery. [x] DO NOT take any diabetic medicine the morning of surgery. Follow instructions for insulin the day before surgery. No insulin the morning of surgery  [x] If you are diabetic and your blood sugar is low or you feel symptomatic, you may drink 1-2 ounces of apple juice or take a glucose tablet. The morning of your procedure, you may call the pre-op area if you have concerns about your blood sugar [phone]. [x] Use your inhalers the morning of surgery. May use your emergency inhaler the morning of surgery if needed and bring with you the day of surgery. [x] Follow physician instructions regarding any blood thinners you may be taking. WHAT TO EXPECT:  [x] The day of surgery you will be greeted and checked in by the [employer]. Please bring your photo ID and insurance card. A nurse will greet you in accordance to the time you are needed in the pre-op area to prepare you for surgery.  Please do not be discouraged if you are not greeted in the order you arrive as there are many variables that are involved in patient preparation. Your patience is greatly appreciated as you wait for your nurse. Please bring in items such as: books, magazines, newspapers, electronics, or any other items  to occupy your time in the waiting area. [x]  Delays may occur with surgery and staff will make a sincere effort to keep you informed of delays. If any delays occur with your procedure, we apologize ahead of time for your inconvenience as we recognize the value of your time.

## 2022-04-20 NOTE — PROGRESS NOTES
Spoke with Dr. Walker Human office, Nile Hatch discussed patient is taking aspirin 81 mg patient's spouse states was instructed last day to be 4/19. Patient is taking eliquis 5 mg 1 tablet 2 times daily and patient's spouse states office did not instruct regarding the eliquis pre op. Nile Hatch states patient's last day is to be 4/22. Spoke with patient's spouse, Kelly May informed her of the above regarding eliquis. She verbalized understanding.

## 2022-04-25 ENCOUNTER — ANESTHESIA EVENT (OUTPATIENT)
Dept: ENDOSCOPY | Age: 67
End: 2022-04-25
Payer: MEDICARE

## 2022-04-26 ENCOUNTER — ANESTHESIA (OUTPATIENT)
Dept: ENDOSCOPY | Age: 67
End: 2022-04-26
Payer: MEDICARE

## 2022-04-26 ENCOUNTER — HOSPITAL ENCOUNTER (OUTPATIENT)
Age: 67
Setting detail: OUTPATIENT SURGERY
Discharge: HOME | End: 2022-04-26
Attending: SURGERY | Admitting: SURGERY
Payer: MEDICARE

## 2022-04-26 VITALS
OXYGEN SATURATION: 92 % | SYSTOLIC BLOOD PRESSURE: 108 MMHG | RESPIRATION RATE: 18 BRPM | DIASTOLIC BLOOD PRESSURE: 53 MMHG

## 2022-04-26 VITALS
HEART RATE: 65 BPM | RESPIRATION RATE: 16 BRPM | OXYGEN SATURATION: 95 % | WEIGHT: 230 LBS | DIASTOLIC BLOOD PRESSURE: 75 MMHG | BODY MASS INDEX: 36.1 KG/M2 | TEMPERATURE: 97.8 F | HEIGHT: 67 IN | SYSTOLIC BLOOD PRESSURE: 128 MMHG

## 2022-04-26 DIAGNOSIS — Z01.812 PRE-OPERATIVE LABORATORY EXAMINATION: Primary | ICD-10-CM

## 2022-04-26 LAB — METER GLUCOSE: 182 MG/DL (ref 74–99)

## 2022-04-26 PROCEDURE — 88305 TISSUE EXAM BY PATHOLOGIST: CPT

## 2022-04-26 PROCEDURE — 45385 COLONOSCOPY W/LESION REMOVAL: CPT | Performed by: SURGERY

## 2022-04-26 PROCEDURE — 2580000003 HC RX 258: Performed by: SURGERY

## 2022-04-26 PROCEDURE — 7100000000 HC PACU RECOVERY - FIRST 15 MIN: Performed by: SURGERY

## 2022-04-26 PROCEDURE — 82962 GLUCOSE BLOOD TEST: CPT

## 2022-04-26 PROCEDURE — 7100000010 HC PHASE II RECOVERY - FIRST 15 MIN: Performed by: SURGERY

## 2022-04-26 PROCEDURE — 3700000001 HC ADD 15 MINUTES (ANESTHESIA): Performed by: SURGERY

## 2022-04-26 PROCEDURE — 3700000000 HC ANESTHESIA ATTENDED CARE: Performed by: SURGERY

## 2022-04-26 PROCEDURE — 2580000003 HC RX 258: Performed by: NURSE ANESTHETIST, CERTIFIED REGISTERED

## 2022-04-26 PROCEDURE — 7100000001 HC PACU RECOVERY - ADDTL 15 MIN: Performed by: SURGERY

## 2022-04-26 PROCEDURE — 3609010400 HC COLONOSCOPY POLYPECTOMY HOT BIOPSY: Performed by: SURGERY

## 2022-04-26 PROCEDURE — 7100000011 HC PHASE II RECOVERY - ADDTL 15 MIN: Performed by: SURGERY

## 2022-04-26 PROCEDURE — 6360000002 HC RX W HCPCS: Performed by: NURSE ANESTHETIST, CERTIFIED REGISTERED

## 2022-04-26 PROCEDURE — 2709999900 HC NON-CHARGEABLE SUPPLY: Performed by: SURGERY

## 2022-04-26 RX ORDER — SODIUM CHLORIDE 9 MG/ML
25 INJECTION, SOLUTION INTRAVENOUS PRN
Status: DISCONTINUED | OUTPATIENT
Start: 2022-04-26 | End: 2022-04-26 | Stop reason: HOSPADM

## 2022-04-26 RX ORDER — PROPOFOL 10 MG/ML
INJECTION, EMULSION INTRAVENOUS PRN
Status: DISCONTINUED | OUTPATIENT
Start: 2022-04-26 | End: 2022-04-26 | Stop reason: SDUPTHER

## 2022-04-26 RX ORDER — SODIUM CHLORIDE 9 MG/ML
INJECTION, SOLUTION INTRAVENOUS CONTINUOUS PRN
Status: DISCONTINUED | OUTPATIENT
Start: 2022-04-26 | End: 2022-04-26 | Stop reason: SDUPTHER

## 2022-04-26 RX ORDER — SODIUM CHLORIDE 0.9 % (FLUSH) 0.9 %
5-40 SYRINGE (ML) INJECTION EVERY 12 HOURS SCHEDULED
Status: DISCONTINUED | OUTPATIENT
Start: 2022-04-26 | End: 2022-04-26 | Stop reason: HOSPADM

## 2022-04-26 RX ORDER — SODIUM CHLORIDE 0.9 % (FLUSH) 0.9 %
5-40 SYRINGE (ML) INJECTION PRN
Status: DISCONTINUED | OUTPATIENT
Start: 2022-04-26 | End: 2022-04-26 | Stop reason: HOSPADM

## 2022-04-26 RX ORDER — SODIUM CHLORIDE 9 MG/ML
INJECTION, SOLUTION INTRAVENOUS CONTINUOUS
Status: DISCONTINUED | OUTPATIENT
Start: 2022-04-26 | End: 2022-04-26 | Stop reason: HOSPADM

## 2022-04-26 RX ADMIN — SODIUM CHLORIDE: 9 INJECTION, SOLUTION INTRAVENOUS at 10:34

## 2022-04-26 RX ADMIN — PROPOFOL 240 MG: 10 INJECTION, EMULSION INTRAVENOUS at 11:35

## 2022-04-26 RX ADMIN — SODIUM CHLORIDE: 9 INJECTION, SOLUTION INTRAVENOUS at 10:50

## 2022-04-26 ASSESSMENT — PAIN - FUNCTIONAL ASSESSMENT: PAIN_FUNCTIONAL_ASSESSMENT: 0-10

## 2022-04-26 NOTE — PROGRESS NOTES
Admit from endoscopy unit. Positioned on left side . Encouraged to pass air. Abdomen soft and no complaints of pain.

## 2022-04-26 NOTE — ANESTHESIA PRE PROCEDURE
Department of Anesthesiology  Preprocedure Note       Name:  Devon Litten   Age:  74 y.o.  :  1955                                          MRN:  [id number]         Date:  2022      Surgeon: Velia Masterson):  Liza Krishnan MD    Procedure: Procedure(s):  COLORECTAL CANCER SCREENING, NOT HIGH RISK    Medications prior to admission:   Prior to Admission medications    Medication Sig Start Date End Date Taking? Authorizing Provider   Potassium 99 MG TABS Take 1 tablet by mouth daily   Yes Historical Provider, MD   Ferrous Gluconate-C-Folic Acid (IRON-C PO) Take by mouth    Historical Provider, MD   insulin glargine (BASAGLAR KWIKPEN) 100 UNIT/ML injection pen Inject 35 Units into the skin every morning PAP Medication 3/1/22   Sofy Caputo DO   levothyroxine (SYNTHROID) 88 MCG tablet Take 1 tablet by mouth Daily 22  Sherley Cardenas MD   mometasone-formoterol [facility]) 200-5 MCG/ACT inhaler Inhale 2 puffs into the lungs 2 times daily Rinse mouth after using. PAP medication. 22   Sherley Cardenas MD   aspirin (ASPIR-LOW) 81 MG EC tablet TAKE ONE TABLET BY MOUTH EVERY DAY 22   Sherley Cardenas MD   Insulin Pen Needle (PEN NEEDLES) 32G X 6 MM MISC Take insulin daily 22   Sherley Cardenas MD   Lancets MISC Give Delica lancets. Tests twice a day A.M. And P.M 22   Sherley Cardenas MD   blood glucose test strips [facility]) strip TEST IN THE MORNING AND IN THE EVENING 22   Sherley Cardenas MD   ELIQUIS 5 MG TABS tablet Take 1 tablet by mouth 2 times daily 21   Sherley Cardenas MD   atorvastatin (LIPITOR) 40 MG tablet TAKE ONE TABLET BY MOUTH DAILY 21   Sherley Cardenas MD   metoprolol succinate (TOPROL XL) 25 MG extended release tablet Take 1 tablet by mouth daily 21   Sherley Cardenas MD   Misc. Devices MISC Please add portability to current O2 order. Resp to evaluate patient for OCD device. 9/15/21   Greg Otero MD   Misc.  Devices KIT Dispense 1 blood pressure cuff. 2/23/21   Valentine Graham MD   OXYGEN Inhale 2.5 L into the lungs nightly  Patient taking differently: Inhale 2.5 L into the lungs nightly Spouse states uses 4 L/min via nc bedtime 7/28/20   Maxwell Tong MD   Cholecalciferol (VITAMIN D3) 1000 units TABS TAKE TWO TABLETS BY MOUTH EVERY DAY 7/19/19   Maxwell Tong MD   zoster recombinant adjuvanted vaccine [facility]) 50 MCG/0.5ML SUSR injection 1 dose now and repeat in 2-6 months 7/19/19   Maxwell Tong MD   acetaminophen (TYLENOL) 500 MG tablet Take 1,000 mg by mouth daily as needed for Pain. Historical Provider, MD       Current medications:    No current facility-administered medications for this encounter. Current Outpatient Medications   Medication Sig Dispense Refill    Potassium 99 MG TABS Take 1 tablet by mouth daily      Ferrous Gluconate-C-Folic Acid (IRON-C PO) Take by mouth      insulin glargine (BASAGLAR KWIKPEN) 100 UNIT/ML injection pen Inject 35 Units into the skin every morning PAP Medication 15 pen 3    levothyroxine (SYNTHROID) 88 MCG tablet Take 1 tablet by mouth Daily 90 tablet 1    mometasone-formoterol (DULERA) 200-5 MCG/ACT inhaler Inhale 2 puffs into the lungs 2 times daily Rinse mouth after using. PAP medication. 3 each 1    aspirin (ASPIR-LOW) 81 MG EC tablet TAKE ONE TABLET BY MOUTH EVERY DAY 90 tablet 1    Insulin Pen Needle (PEN NEEDLES) 32G X 6 MM MISC Take insulin daily 100 each 1    Lancets MISC Give Delica lancets. Tests twice a day A.M. And P.M 200 each 1    blood glucose test strips (ONETOUCH VERIO) strip TEST IN THE MORNING AND IN THE EVENING 200 each 1    ELIQUIS 5 MG TABS tablet Take 1 tablet by mouth 2 times daily 180 tablet 1    atorvastatin (LIPITOR) 40 MG tablet TAKE ONE TABLET BY MOUTH DAILY 90 tablet 1    metoprolol succinate (TOPROL XL) 25 MG extended release tablet Take 1 tablet by mouth daily 90 tablet 1    Misc. Devices MISC Please add portability to current O2 order.  Resp to evaluate patient for OCD device. 1 each 0    Misc. Devices KIT Dispense 1 blood pressure cuff. 1 kit 0    OXYGEN Inhale 2.5 L into the lungs nightly (Patient taking differently: Inhale 2.5 L into the lungs nightly Spouse states uses 4 L/min via nc bedtime) 1 Device 99    Cholecalciferol (VITAMIN D3) 1000 units TABS TAKE TWO TABLETS BY MOUTH EVERY DAY 30 tablet 0    zoster recombinant adjuvanted vaccine (SHINGRIX) 50 MCG/0.5ML SUSR injection 1 dose now and repeat in 2-6 months 0.5 mL 0    acetaminophen (TYLENOL) 500 MG tablet Take 1,000 mg by mouth daily as needed for Pain.          Allergies:  No Known Allergies    Problem List:    Patient Active Problem List   Diagnosis Code    DM (diabetes mellitus) ([facility] [address] E11.9    Class 2 obesity due to excess calories with serious comorbidity and body mass index (BMI) of 38.0 to 38.9 in adult [id number]    Hyperlipidemia E78.5    Essential hypertension I10    Hypothyroidism E03.9    Lung nodules R91.8    Arthritis of shoulder region, left M19.012    OA (osteoarthritis of the spine) M47.9    S/P laminectomy with spinal fusion Z98.1    Colorectal polyps K63.5    Diverticula of colon K57.30    NIKO (obstructive sleep apnea) G47.33    Hypoxemia R09.02    Restrictive lung disease secondary to obesity J98.4, E66.9    Atypical atrial flutter (HCC) I48.4    Sinus node dysfunction (HCC) I49.5    Pacemaker Z95.0    Persistent atrial fibrillation (HCC) I48.19    Diabetes mellitus type 2 in obese (HCC) E11.69, E66.9    Obesity E66.9    Chronic obstructive pulmonary disease, unspecified COPD type (HCC) J44.9    Acute heart failure with preserved ejection fraction (HFpEF) (HCC) I50.31    Chronic hypercapnic respiratory failure (HCC) J96.12    RADHA (acute kidney injury) ([facility] [address] N17.9    Acute kidney injury ([facility] [address] N17.9    Encounter regarding vascular access for dialysis for ESRD ([facility] [address] N18.6, Z99.2       Past Medical History:        Diagnosis Date    Atrial fibrillation [facility])     Carpal tunnel syndrome     Encounter regarding vascular access for dialysis for ESRD ([facility] [address] 3/12/2022    Hyperlipidemia     Hypertension     Hypothyroidism     Lung nodule     Nocturnal hypoxemia due to obesity 2013    Obesity     [facility]KO [facility]obstructive sleep apnea) 2013    [facility]    Osteoarthritis     Pinched nerve     Lumbar    Restrictive lung disease secondary to obesity 2016    Sinus node dysfunction (HCC)     Type II or unspecified type diabetes mellitus without mention of complication, not stated as uncontrolled        Past Surgical History:        Procedure Laterality Date    BACK SURGERY      [facility]. 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 Calos Blandon        Social History:    Social History     Tobacco Use    Smoking status: Former Smoker     Packs/day: 0.10     Years: 35.00     Pack years: 3.50     Types: Cigarettes     Quit date: 2004     Years since quittin.4    Smokeless tobacco: Former User   Substance Use Topics    Alcohol use: No     Alcohol/week: 0.0 standard drinks                                Counseling given: Not Answered      Vital Signs (Current):   Vitals:    22 1353   Weight: 230 lb (104.3 kg)   Height: 5' 7\" (1.702 m)                                              BP Readings from Last 3 Encounters:   22 139/64   22 138/65   22 (!) 119/52       NPO Status:                                                                                 BMI:   Wt Readings from Last 3 Encounters:   22 234 lb (106.1 kg)   22 234 lb 1.6 oz (106.2 kg)   22 246 lb 4.1 oz (111.7 kg)     Body mass index is 36.02 kg/m².     CBC:   Lab Results   Component Value Date    WBC 7.1 04/12/2022    RBC 3.38 04/12/2022    HGB 9.9 04/12/2022    HCT 32.2 04/12/2022    MCV 95.3 04/12/2022    RDW 15.5 04/12/2022     04/12/2022       CMP:   Lab Results   Component Value Date     04/12/2022    K 4.3 04/12/2022    K 5.0 03/10/2022     04/12/2022    CO2 21 04/12/2022    BUN 15 04/12/2022    CREATININE 1.0 04/12/2022    GFRAA >60 04/12/2022    LABGLOM >60 04/12/2022    GLUCOSE 201 04/12/2022    GLUCOSE 133 04/18/2012    PROT 7.7 04/12/2022    CALCIUM 8.9 04/12/2022    BILITOT 0.7 04/12/2022    ALKPHOS 122 04/12/2022    AST 12 04/12/2022    ALT 15 04/12/2022       POC Tests: No results for input(s): POCGLU, POCNA, POCK, POCCL, POCBUN, POCHEMO, POCHCT in the last 72 hours. Coags:   Lab Results   Component Value Date    PROTIME 14.8 03/11/2022    INR 1.3 03/11/2022    APTT 34.4 03/11/2022       HCG (If Applicable): No results found for: PREGTESTUR, PREGSERUM, HCG, HCGQUANT     ABGs: No results found for: PHART, PO2ART, ENT0ZGX, GGO6SPN, BEART, M9FBCYIG     Type & Screen (If Applicable):  No results found for: LABABO, LABRH    Drug/Infectious Status (If Applicable):  No results found for: HIV, HEPCAB    COVID-19 Screening (If Applicable):   Lab Results   Component Value Date    COVID19 Not Detected 09/17/2021           Anesthesia Evaluation  Patient summary reviewed  Airway: Mallampati: III  TM distance: >3 FB   Neck ROM: full  Mouth opening: > = 3 FB Dental:      Comment: Dentition is poor with multiple missing molars, right upper incisor is loose. Pulmonary:   (+) COPD:  sleep apnea:  decreased breath sounds,                            ROS comment: Former smoker: 1 PPD x 44 years, started at age 3.   Quit 15 years ago   Cardiovascular:    (+) hypertension:, pacemaker: pacemaker, dysrhythmias ( Atypical atrial flutter ): atrial flutter, murmur ( Grade 3/6 murmur), hyperlipidemia        Rhythm: regular  Rate: normal                 ROS comment: ECG: Ventricular-paced rhythm  Abnormal ECG  When compared with ECG of 11-MAR-2022 06:47,  No significant change was found  Confirmed by Paresh Holliday ([id number]) on 3/11/2022 1:32:14 PM    ECHO:    Left Ventricle   Left ventricle is normal in size . Micro-bubble contrast injected to enhance left ventricular visualization. No regional wall motion abnormalities seen. Normal left ventricular ejection fraction. Ejection fraction is visually estimated at 55-60%. Indeterminate diastolic function. Right Ventricle   The right ventricle was not clearly visualized. Grossly normal right ventricular size and function. Left Atrium   Normal sized left atrium. Interatrial septum appears intact. Right Atrium   Right atrium is not clearly visualized. Mitral Valve   Structurally normal mitral valve. Tricuspid Valve   The tricuspid valve was not well visualized. Aortic Valve   The aortic valve leaflets were not well visualized. Pulmonic Valve   The pulmonic valve was not well visualized. Pericardial Effusion   No evidence of pericardial effusion. Aorta   Aortic root dimension within normal limits. Conclusions      Summary   Technically suboptimal and limited study. Left ventricle is normal in size . No regional wall motion abnormalities seen. Normal left ventricular ejection fraction. Signature      ----------------------------------------------------------------   Electronically signed by Cecilia Ferguson MD(Interpreting   physician) on 09/20/2021 04:25 PM     Neuro/Psych:   (+) neuromuscular disease ( CTS):,             GI/Hepatic/Renal:   (+) morbid obesity          Endo/Other:    (+) DiabetesType II DM, poorly controlled, , hypothyroidism::., .                 Abdominal:   (+) obese ( Morbidly obese),           Vascular: Other Findings:           Anesthesia Plan      MAC     ASA 4       Induction: intravenous.       Anesthetic plan and risks discussed with patient. Plan discussed with CRNA.                 Eli Armstrong MD   4/26/2022

## 2022-04-26 NOTE — H&P
Patient ID: Devon Litten is a 74 y.o. male. HPI  74 yr old male here for colonoscopy eval.  Last colonoscopy was 2012--hyperplastic polyp. Denies abd pain, change in bowel habits, blood in stool, unintentional weight loss, or family hx of colon cancer.      Past Medical History        Past Medical History:   Diagnosis Date    Atrial fibrillation ([facility] [address]      Carpal tunnel syndrome      Encounter regarding vascular access for dialysis for ESRD ([facility] [address] 3/12/2022    Hyperlipidemia      Hypertension      Hypothyroidism      Lung nodule      Nocturnal hypoxemia due to obesity 8/8/2013    Obesity      NIKO (obstructive sleep apnea) 8/8/2013     [facility]    Osteoarthritis      Pinched nerve       Lumbar    Restrictive lung disease secondary to obesity 7/25/2016    Sinus node dysfunction (HCC)      Type II or unspecified type diabetes mellitus without mention of complication, not stated as uncontrolled              Past Surgical History         Past Surgical History:   Procedure Laterality Date    BACK SURGERY   2012     [facility]. Pinched nerve in back    BACK SURGERY   05/30/2017    COLONOSCOPY   2007     multiple colon polyps    COLONOSCOPY   06/04/12     COLONOSCOPY    EYE SURGERY         lennie cataracts implant    HERNIA REPAIR         umbilical    PACEMAKER PLACEMENT   10/03/2017     Medtronic dual chamber    Dr. Bianka Marin Right              Current Facility-Administered Medications          Current Outpatient Medications   Medication Sig Dispense Refill    Ferrous Gluconate-C-Folic Acid (IRON-C PO) Take by mouth        insulin glargine (BASAGLAR KWIKPEN) 100 UNIT/ML injection pen Inject 35 Units into the skin every morning PAP Medication 15 pen 3    levothyroxine (SYNTHROID) 88 MCG tablet Take 1 tablet by mouth Daily 90 tablet 1    mometasone-formoterol (DULERA) 200-5 MCG/ACT inhaler Inhale 2 puffs into the lungs 2 times daily Rinse mouth after using. PAP medication. 3 each 1    aspirin (ASPIR-LOW) 81 MG EC tablet TAKE ONE TABLET BY MOUTH EVERY DAY 90 tablet 1    Insulin Pen Needle (PEN NEEDLES) 32G X 6 MM MISC Take insulin daily 100 each 1    Lancets MISC Give Delica lancets. Tests twice a day A.M. And P.M 200 each 1    blood glucose test strips (ONETOUCH VERIO) strip TEST IN THE MORNING AND IN THE EVENING 200 each 1    tiotropium (SPIRIVA HANDIHALER) 18 MCG inhalation capsule Inhale 1 capsule into the lungs daily 30 capsule 12    ELIQUIS 5 MG TABS tablet Take 1 tablet by mouth 2 times daily 180 tablet 1    atorvastatin (LIPITOR) 40 MG tablet TAKE ONE TABLET BY MOUTH DAILY 90 tablet 1    metoprolol succinate (TOPROL XL) 25 MG extended release tablet Take 1 tablet by mouth daily 90 tablet 1    diclofenac sodium (VOLTAREN) 1 % GEL Apply 2 g topically 4 times daily 50 g 1    Nebulizers (NEBULIZER COMPRESSOR) MISC Please provide patient with nebulizer and tubing needed to use equipment. 1 each 0    Misc. Devices MISC Please add portability to current O2 order. Resp to evaluate patient for OCD device. 1 each 0    Misc. Devices KIT Dispense 1 blood pressure cuff. 1 kit 0    OXYGEN Inhale 2.5 L into the lungs nightly 1 Device 99    Cholecalciferol (VITAMIN D3) 1000 units TABS TAKE TWO TABLETS BY MOUTH EVERY DAY 30 tablet 0    zoster recombinant adjuvanted vaccine (SHINGRIX) 50 MCG/0.5ML SUSR injection 1 dose now and repeat in 2-6 months 0.5 mL 0    acetaminophen (TYLENOL) 500 MG tablet Take 1,000 mg by mouth daily as needed for Pain. No current facility-administered medications for this visit.             No Known Allergies     Family History         Family History   Problem Relation Age of Onset    Cancer Mother           skin    Heart Disease Mother      High Blood Pressure Father      Amyotrophic lateral sclerosis Father      Cancer Brother      High Blood Pressure Brother      Diabetes Brother              Social History Socioeconomic History    Marital status:        Spouse name: Not on file    Number of children: Not on file    Years of education: Not on file    Highest education level: Not on file   Occupational History    Not on file   Tobacco Use    Smoking status: Former Smoker       Packs/day: 0.10       Years: 35.00       Pack years: 3.50       Types: Cigarettes       Quit date: 2004       Years since quittin.3    Smokeless tobacco: Former User   Vaping Use    Vaping Use: Never used   Substance and Sexual Activity    Alcohol use: No       Alcohol/week: 0.0 standard drinks    Drug use: No    Sexual activity: Yes       Partners: Female   Other Topics Concern    Not on file   Social History Narrative    Not on file      Social Determinants of Health          Financial Resource Strain: High Risk    Difficulty of Paying Living Expenses: Very hard   Food Insecurity: No Food Insecurity    Worried About Running Out of Food in the Last Year: Never true    Blaise of Food in the Last Year: Never true   Transportation Needs: No Transportation Needs    Lack of Transportation (Medical): No    Lack of Transportation (Non-Medical):  No   Physical Activity: Inactive    Days of Exercise per Week: 0 days    Minutes of Exercise per Session: 0 min   Stress:     Feeling of Stress : Not on file   Social Connections:     Frequency of Communication with Friends and Family: Not on file    Frequency of Social Gatherings with Friends and Family: Not on file    Attends Rastafari Services: Not on file    Active Member of Clubs or Organizations: Not on file    Attends Club or Organization Meetings: Not on file    Marital Status: Not on file   Intimate Partner Violence:     Fear of Current or Ex-Partner: Not on file    Emotionally Abused: Not on file    Physically Abused: Not on file    Sexually Abused: Not on file   Housing Stability: [address] Unable to Pay for Housing in the Last Year: No    Number of Places Lived in the Last Year: 1    Unstable Housing in the Last Year: No         Review of Systems   Constitutional: Negative for activity change, appetite change, chills, fever and unexpected weight change. HENT: Negative. Eyes: Negative. Respiratory: Positive for shortness of breath and wheezing. Negative for cough, choking and chest tightness. Cardiovascular: Positive for leg swelling. Negative for chest pain and palpitations. Gastrointestinal: Negative for abdominal distention, abdominal pain, anal bleeding, blood in stool, constipation, diarrhea, nausea and vomiting. Endocrine: Negative for cold intolerance, heat intolerance, polydipsia and polyuria. Genitourinary: Negative for dysuria, frequency, hematuria, penile discharge, penile pain, penile swelling, scrotal swelling, testicular pain and urgency. Musculoskeletal: Positive for arthralgias, back pain, gait problem, neck pain and neck stiffness. Negative for joint swelling and myalgias. Skin: Negative for color change, pallor, rash and wound. Allergic/Immunologic: Negative for environmental allergies and food allergies. Neurological: Positive for weakness. Negative for dizziness, seizures, syncope, light-headedness, numbness and headaches. Hematological: Negative for adenopathy. Does not bruise/bleed easily. Psychiatric/Behavioral: Negative for agitation, confusion, decreased concentration, hallucinations, self-injury and suicidal ideas. The patient is not nervous/anxious and is not hyperactive. Objective:   Physical Exam  Constitutional:       General: He is not in acute distress. Appearance: Normal appearance. He is well-developed. He is not ill-appearing, toxic-appearing or diaphoretic. HENT:      Head: Normocephalic and atraumatic. Mouth/Throat:      Mouth: Mucous membranes are moist.      Pharynx: Oropharynx is clear. Eyes:      General: No scleral icterus. Right eye: No discharge. Left eye: No discharge. Extraocular Movements: Extraocular movements intact. Pupils: Pupils are equal, round, and reactive to light. Cardiovascular:      Rate and Rhythm: Normal rate. Rhythm irregular. Heart sounds: Normal heart sounds. No murmur heard. Pulmonary:      Effort: Pulmonary effort is normal. No respiratory distress. Breath sounds: Normal breath sounds. No stridor. No wheezing, rhonchi or rales. Abdominal:      General: Bowel sounds are normal. There is no distension. Palpations: Abdomen is soft. There is no mass. Tenderness: There is no abdominal tenderness. There is no guarding or rebound. Hernia: No hernia is present. Musculoskeletal:         General: Normal range of motion. Cervical back: Normal range of motion and neck supple. Skin:     General: Skin is warm and dry. Neurological:      General: No focal deficit present. Mental Status: He is alert and oriented to person, place, and time. Psychiatric:         Mood and Affect: Mood normal.         Behavior: Behavior normal.         Thought Content: Thought content normal.         Judgment: Judgment normal.            Assessment:   At risk for colon cancer secondary to age                Plan:   Recommend colonoscopy. The patient was explained the risks/benefits/alternatives/expected outcomes of the procedure. The patient was explained the risks of the procedure, including, but not limited to, the risk of reaction to the anesthesia medicine and the risk of perforation requiring further surgery. The patient was informed that they may require biopsy or polypectomy. These procedures may increase the risk of complication. All questions were answered. The patient verbalized understanding and agreed to proceed.                       Nydia Greene MD      UPDATED 4/26/22  History and physical unchanged  For colonoscopy    Nydia Greene MD, FACS  4/26/2022  10:14 AM

## 2022-04-26 NOTE — OP NOTE
Operative Note      Patient: Brittanie Oates  YOB: 1955  MRN: [id number]    Date of Procedure: 4/26/2022    Pre-Op Diagnosis: AT RISK FOR COLON CANCER    Post-Op Diagnosis: Same and diverticula starting at 40 cm and 1 cm mass at 20 cm       Procedure(s):  COLONOSCOPY POLYPECTOMY HOT BIOPSY (Snare)    Surgeon(s):  Janet Boyer MD    Assistant:   * No surgical staff found *    Anesthesia: Monitor Anesthesia Care    Estimated Blood Loss (mL): Minimal    Complications: None    Specimens:   * No specimens in log *    Implants:  * No implants in log *      Drains: * No LDAs found *    Findings: diverticula starting at 40 cm and 1 cm mass at 20 cm    Detailed Description of Procedure:   COLONOSCOPY PROCEDURE NOTE  PROCEDURE:  The patient was brought into the endoscopy suite and placed in the left lateral decubitus position. A digital rectal exam was performed after the initiation of LMAC anesthesia and failed to reveal any obstructing masses or lesions. A colonoscope was inserted into the patient's anus and passed through the rectum, sigmoid, descending, transverse, and ascending colon all the way to the level of the cecum. Visualization of the cecum was confirmed by visualization of the ileo-cecal valve and confluence of the tinea. The scope was then withdrawn the entire length of the colon. There were no masses, polyps, or lesions noted until reaching 40 cm where start of diverticula was seen. At 20 cm a 1 cm mass was seen and removed with snare connected to electrocautery. Upon reaching the anus, the scope was retroflexed. There were no significant hemorrhoids noted. The scope was straightened and withdrawn entirely. The patient tolerated the procedure well and there were no complications. Prep was adequate; repeat colonoscopy in 3 years pending pathology.       Janet Boyer MD  4/26/2022      Electronically signed by Janet Boyer MD on 4/26/2022 at 11:36 AM

## 2022-04-29 NOTE — PROGRESS NOTES
Vascular Surgery Progress Note    Pt is being seen in f/u today regarding need for dialysis access    Subjective  Pt s/e.   Denies pain  Denies CP, SOB  Temporary dialysis catheter site tender  States good urine output in his vallecillo    Current Medications:    sodium chloride      dextrose        sodium chloride flush, sodium chloride, ondansetron **OR** ondansetron, acetaminophen **OR** acetaminophen, polyethylene glycol, glucose, dextrose, glucagon (rDNA), dextrose, ipratropium-albuterol    analgesic ointment   Topical Once    insulin glargine  15 Units SubCUTAneous QAM AC    [Held by provider] amLODIPine  5 mg Oral Daily    atorvastatin  40 mg Oral Daily    [Held by provider] bumetanide  2 mg Oral Daily    apixaban  5 mg Oral BID    levothyroxine  88 mcg Oral Daily    [Held by provider] lisinopril  40 mg Oral Daily    metoprolol succinate  25 mg Oral Daily    sodium chloride flush  5-40 mL IntraVENous 2 times per day    insulin lispro  0-6 Units SubCUTAneous TID WC    insulin lispro  0-3 Units SubCUTAneous Nightly      PHYSICAL EXAM:    BP (!) 103/46   Pulse 58   Temp 98 °F (36.7 °C) (Temporal)   Resp 18   Ht 5' 7\" (1.702 m)   Wt 246 lb 4.1 oz (111.7 kg)   SpO2 96%   BMI 38.57 kg/m²     Intake/Output Summary (Last 24 hours) at 3/14/2022 1208  Last data filed at 3/14/2022 0656  Gross per 24 hour   Intake --   Output 2150 ml   Net -2150 ml        Gen: awake, alert, oriented x3, in no apparent distress  CVS: S1, S2  Resp: No increased work of breathing, on 3L nasal cannula  Abd: soft, non tender, non distended  R LE: +3 edema  L LE: +3 edema  R groin: clean, dry, no hematoma, ecchymosis    LABS:    Lab Results   Component Value Date    WBC 6.6 03/14/2022    HGB 8.6 (L) 03/14/2022    HCT 27.4 (L) 03/14/2022     03/14/2022    PROTIME 14.8 (H) 03/11/2022    INR 1.3 03/11/2022    APTT 34.4 03/11/2022    K 4.8 03/14/2022    BUN 45 (H) 03/14/2022    CREATININE 5.0 (H) 03/14/2022     A/P   Acute renal failure requiring dialysis    · Temporary dialysis catheter placed 3/11/22  · Dialysis per nephrology  · Glucose control per primary  · Awaiting nephrology recommendations regarding tunneled catheter  · Vascular surgery available to place tunneled dialysis catheter once appropriate      Carmen Vallejo, APRN - CNP Statement Selected

## 2022-05-02 ENCOUNTER — OFFICE VISIT (OUTPATIENT)
Dept: PULMONOLOGY | Age: 67
End: 2022-05-02
Payer: MEDICARE

## 2022-05-02 VITALS
OXYGEN SATURATION: 92 % | HEART RATE: 78 BPM | HEIGHT: 67 IN | RESPIRATION RATE: 20 BRPM | BODY MASS INDEX: 35.31 KG/M2 | SYSTOLIC BLOOD PRESSURE: 120 MMHG | WEIGHT: 225 LBS | DIASTOLIC BLOOD PRESSURE: 58 MMHG | TEMPERATURE: 97.5 F

## 2022-05-02 DIAGNOSIS — J44.9 CHRONIC OBSTRUCTIVE PULMONARY DISEASE, UNSPECIFIED COPD TYPE (HCC): ICD-10-CM

## 2022-05-02 DIAGNOSIS — R91.1 LUNG NODULE: ICD-10-CM

## 2022-05-02 DIAGNOSIS — G47.33 OBSTRUCTIVE SLEEP APNEA: Primary | ICD-10-CM

## 2022-05-02 LAB
EXPIRATORY TIME: 7.1 SEC
FEF 25-75% %PRED-PRE: 140 L/SEC
FEF 25-75% PRED: 2.27 L/SEC
FEF 25-75%-PRE: 3.18 L/SEC
FEV1 %PRED-PRE: 71 %
FEV1 PRED: 2.86 L
FEV1/FVC %PRED-PRE: 113 %
FEV1/FVC PRED: 77 %
FEV1/FVC: 87 %
FEV1: 2.04 L
FVC %PRED-PRE: 62 %
FVC PRED: 3.73 L
FVC: 2.34 L
PEF %PRED-PRE: NORMAL
PEF PRED: NORMAL
PEF-PRE: NORMAL

## 2022-05-02 PROCEDURE — 99213 OFFICE O/P EST LOW 20 MIN: CPT | Performed by: INTERNAL MEDICINE

## 2022-05-02 PROCEDURE — 94010 BREATHING CAPACITY TEST: CPT | Performed by: INTERNAL MEDICINE

## 2022-05-02 ASSESSMENT — PULMONARY FUNCTION TESTS
FVC_PERCENT_PREDICTED_PRE: 62
FEV1/FVC_PREDICTED: 77
FEV1/FVC: 87
FVC: 2.34
FVC_PREDICTED: 3.73
FEV1/FVC_PERCENT_PREDICTED_PRE: 113
FEV1_PERCENT_PREDICTED_PRE: 71
FEV1: 2.04
FEV1_PREDICTED: 2.86

## 2022-05-02 NOTE — PROGRESS NOTES
[place]  Department of Pulmonary, Critical Care and Sleep Medicine  [address] [facility]  Department of Internal Medicine  Office Note    Dear Nava Lepe MD    We had the pleasure of seeing Anya Sosa, in the [address] at [facility] regarding his lung nodules, morbid obesity with restrictive lung (TLC 74%) disease and sleep apnea (not tolerated). HISTORY OF PRESENT ILLNESS:    Anya Sosa is a 74 y.o. male with a past medical history of HTN, HLP, DM, hypothyroid, OA, carpal tunnel S/pa surgery, restrictive lung problems and NIKO intolerant of CPAP therapy. He also has a history of lung nodules that are stable since 2012 with continued surveillance. We reviewed his CT of the chest which demonstrates stable lung nodules since 2012 and we will continue surveillance yearly. We had a long discussion regarding untreated NIKO and not tolerating his CPAP. In 2017, he underwent back surgery which led to prolonged respiratory failure most likely due to upper airway obstruction, volume overload and pain medication. The surgery occurred at [facility] and his wife requested him to be transferred to [facility] where he was able to be extubated and went on to rehabilitation. He is home now but unfortunately the back surgery did not resolve his problems. He has lost 20 pounds from this event and he continues only to use the oxygen only at night. In 2019, he had a pacer placed by EP for sinus rodolfo dysfunction. Mr. Krishna Correa was seen today for follow up. He has been doing well with minimal shortness of breath with activity. He has no coughing or wheezing. He continues to refuse CPAP therapy but is wearing O2 at 2.5 L nasal/canula at night. He states he is complaint. No chest pain, palpitations or lower extremity edema. No lightheadedness or syncope. He is riding his spider motorcycle.   He is using Dulera 200 BID as his insurance will not cover Breo and his lung function was 52 %. We discussed his oxygen concentrators for camping trips. We discussed his weight and BP issues. He is getting his motorcycles fixed and got a new truck He was admitted for urinary issues and feels better. He is seeing a kidney specialist. His neuropathic pain is worsening. Brething is the same. ALLERGIES:  No Known Allergies    PAST MEDICAL HISTORY:       Diagnosis Date    Atrial fibrillation ([facility]     Carpal tunnel syndrome     Encounter regarding vascular access for dialysis for ESRD ([facility] 3/12/2022    Hyperlipidemia     Hypertension     Hypothyroidism     Lung nodule     Nocturnal hypoxemia due to obesity 8/8/2013    Obesity     NIKO (obstructive sleep apnea) 8/8/2013    [facility]    Osteoarthritis     Pinched nerve     Lumbar    Restrictive lung disease secondary to obesity 7/25/2016    Sinus node dysfunction (HCC)     Type II or unspecified type diabetes mellitus without mention of complication, not stated as uncontrolled         MEDICATIONS:   Current Outpatient Medications   Medication Sig Dispense Refill    Potassium 99 MG TABS Take 1 tablet by mouth daily      Ferrous Gluconate-C-Folic Acid (IRON-C PO) Take by mouth      insulin glargine (BASAGLAR KWIKPEN) 100 UNIT/ML injection pen Inject 35 Units into the skin every morning PAP Medication 15 pen 3    levothyroxine (SYNTHROID) 88 MCG tablet Take 1 tablet by mouth Daily 90 tablet 1    mometasone-formoterol (DULERA) 200-5 MCG/ACT inhaler Inhale 2 puffs into the lungs 2 times daily Rinse mouth after using. PAP medication. 3 each 1    aspirin (ASPIR-LOW) 81 MG EC tablet TAKE ONE TABLET BY MOUTH EVERY DAY 90 tablet 1    Insulin Pen Needle (PEN NEEDLES) 32G X 6 MM MISC Take insulin daily 100 each 1    Lancets MISC Give Delica lancets. Tests twice a day A.M.  And P.M 200 each 1    blood glucose test strips (ONETOUCH VERIO) strip TEST IN THE MORNING AND IN THE EVENING 200 each 1    ELIQUIS 5 MG TABS tablet Take 1 tablet by mouth 2 times daily 180 tablet 1    atorvastatin (LIPITOR) 40 MG tablet TAKE ONE TABLET BY MOUTH DAILY 90 tablet 1    metoprolol succinate (TOPROL XL) 25 MG extended release tablet Take 1 tablet by mouth daily 90 tablet 1    Misc. Devices MISC Please add portability to current O2 order. Resp to evaluate patient for OCD device. 1 each 0    Misc. Devices KIT Dispense 1 blood pressure cuff. 1 kit 0    OXYGEN Inhale 2.5 L into the lungs nightly (Patient taking differently: Inhale 2.5 L into the lungs nightly Spouse states uses 4 L/min via nc bedtime) 1 Device 99    Cholecalciferol (VITAMIN D3) 1000 units TABS TAKE TWO TABLETS BY MOUTH EVERY DAY 30 tablet 0    zoster recombinant adjuvanted vaccine (SHINGRIX) 50 MCG/0.5ML SUSR injection 1 dose now and repeat in 2-6 months 0.5 mL 0    acetaminophen (TYLENOL) 500 MG tablet Take 1,000 mg by mouth daily as needed for Pain. No current facility-administered medications for this visit. SOCIAL AND OCCUPATIONAL HEALTH:  Quit smoking about 5 years ago. Smoker 1 - 3 ppd for 40 years. There is no history of TB or TB exposure. There is no asbestos or silica dust exposure. The patient reports no coal, foundry, quarry or [employer] exposure. There is no recent travel history noted. The patient denies a history of recreational or IV drug use. No hot tub exposure. The patient has no pets. Hobbies include riding his motorcycle. The patient denies excessive alcohol intake. The patient reports no birds or exotic animals. He is , has 4 stepchildren. He is currently on disability but was a  for 30 years. He rides a 'spider\" motorcycle.  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 Niki Radish nerve in back    BACK SURGERY  2017    COLONOSCOPY  2007    multiple colon polyps    COLONOSCOPY  2012    COLONOSCOPY    COLONOSCOPY  2022    polyp; diverticula--sarah    COLONOSCOPY N/A 2022    COLONOSCOPY POLYPECTOMY HOT BIOPSY (Snare) performed by Ida Wood MD at [address]      lennie cataracts implant    HERNIA REPAIR      umbilical    PACEMAKER PLACEMENT  10/03/2017    Medtronic dual chamber    Dr. Amy Blandon        FAMILY HISTORY: A review of medical events in the patient's family , including disease which may be hereditary or place the patient at risk were reviewed. Family History   Problem Relation Age of Onset    Cancer Mother         skin    Heart Disease Mother     High Blood Pressure Father     Amyotrophic lateral sclerosis Father     Cancer Brother     High Blood Pressure Brother     Diabetes Brother       Family Status   Relation Name Status    Mother      Father      Sister malka     Sister sarah Alive    Sister kaela Alive    Sister Zhanna Tani    Brother lisa     Brother lucrecia     Brother mars     Brother renay         REVIEW OF SYSTEMS: The patients health assessment form was reviewed. Constitutional: General health fair . The patient complains of weight changes. The patient denies any recent fevers or weakness. Head: Patient denies any history of trauma, convulsive disorder or syncope.     Skin: Patient denies history of changes in pigmentation, eruptions or pruritus. Eyes: Patient denies any history of color blindness, photophobia, diplopia, inflammation, cataracts or glaucoma. Ears: Patient denies history of deafness, tinnitus, pain, discharge or recurrent infections. Nose/Throat: Patient admits to drainage. Patient denies history of soreness of mouth or tongue. Patient denies history of hoarseness, voice changes, sore throats or recurrent tonsillitis. Lymphatic:  Patient denies history of enlargement, inflammation, pain or suppuration. He admits to history of lymphoma. Cardiovascular:  Patient admits to history of palpations, & chest pain. He admits to orthopnea & cold extremities. He admits to edema. Biventricular pacemaker for sinus node dysfunction.    Pulmonary:  Patient denies wheezing, dyspnea, exertional dyspnea, nocturnal dyspnea. He denies cough. He denies hemoptysis or pleurisy. He complains of sleep disorder. He reports symptoms of sleep apnea. Has nocturnal hypoxemia with O2 at 2.5 L N/C. Gastrointestinal: Patient denies changes in appetite. He denies dysphagia, odynophagia or abdominal pain. He denies hematochezia, melena, bowel habit changes or hemorrhoids. Allergy/Immunology: The patient denies itching, hives, or angioedema. The patient denies a problem with seasonal/environmental allergies. Genitourinary:  The patient denies a history of stones or UTI. Vascular: No history of peripheral vascular disease, carotid occlusive disease or aneurysms. Musculoskeletal: The patient reports a history of arthritis & joint pains. He complains of loss of strength. He severe bilateral upper shoulder discomfort   and previous laminectomy with ongoing persistent hip pain and inability to exercise. OA noted. Complains of right thumb discomfort. Breasts: He denies history of masses, lumps, pain, or nipple changes. The patient denies a history of breast cancer.   Neurological: Patient denies vertigo. He denies twitching, convulsions, loss of consciousness. No memory problems. Psychological: Patient denies moodiness, depression or anxiety. He denies obsessions, delusions, illusions or hallucinations. Cancer: No history of cancer reported. Endocrine: No history of goiter. No history of thyroid disorders. He reports diabetes. He is taking 70/30 insulin 20 units at bedtime, reports BG of 100-110's in the morning without overnight hypoglycemic symptoms. He is Rx 12 units qAM and 8 units qHS. His last A1c was 6.3 from 10/3/17. Hematopoietic:  He denies history of bleeding disorders or easy bruising. He denies hypercoagulable disorder. Integumentory: No skin lesion, rashes, venous stasis or varicose veins. They denies any report of skin cancer. Rheumatic:  The patient denies polyarthritis or inflammatory joint disease. PHYSICAL EXAMINATION:   Vitals:    05/02/22 1458   BP: (!) 120/58   Pulse: 78   Resp: 20   Temp: 97.5 °F (36.4 °C)   TempSrc: Infrared   SpO2: 92%   Weight: 225 lb (102.1 kg)   Height: 5' 7\" (1.702 m)     Constitutional: A morbid obese  74 y.o. male who is alert, oriented, cooperative and in no apparent distress. Head was normocephalic and atraumatic. EENT: EOMI ESTRELLA. MMM. No icterus. No conjunctival injections. Septum was midline, mucosa was without erythema, exudates or cobblestoning. No thrush. Neck: Supple without thyromegaly. No elevated JVP. Trachea was midline. No carotid bruits. Respiratory: Symmetrical without dullness to percussion. Faint crackles on right. No wheezes, rhonchi. No intercostal retraction or use of accessory muscles. No egophony. Cardiovascular: Nonpalpable PMI. Regular without murmur, clicks, gallops or rubs. No left or right ventricular heave. Pacer in situ  Pulses:  Carotid, radial and femoral pulses were equal bilaterally. Abdomen: Obese, rounded and soft without organomegaly. No rebound, rigidity.     Lymphatic: No lymphadenopathy. Musculoskeletal: Ambulates without assistance. Flattened lordotic curvature of the spine. No involuntary movements. Extremities: Tremor noted bilateral hands. Trace lower extremity edema. No ulcerations, tenderness. Noted varicosities & venous statis/ erythema. Sensory function sensation is very diminished. Skin:  Warm and dry. Poor skin color, turgor. No bruises or skin rashes. Old surgical scars are noted. Back surgery scar. Neurological/Psychiatric: General behavior is normal. Memory is slightly impair on long term recall. Emotional status is normal.  Cranial nerves II-XII are intact. DATA:   The data collected below information that was obtained, reviewed, analyzed and interpreted today. Spirometry was compared to previous test if available and demonstrates an FVC of 2.34 liters which is 62 % of predicted with an FEV1 of 2.04 liters which is 71 % of predicted. FEV1/FVC ratio is 87 %. Mid expiratory flow rates are 95 % of predicted. Maximum voluntary ventilation is 75 liters per minute or 58 % of predicted. Flow volume loop shows no signs of intrathoracic or extrathoracic process. Impression: Moderate Restrictive Pathology    Static lung volumes (2015)  demonstrate an ERV of 0.16 liters which is 15% predicted, TGV of 2.34 liters which is 71% predicted, RV which is 2.18 liters which is 103% predicted, TLC of 4.72 liters which is 74% predicted. RV/TLC ratio is 140% predicted. DLCO is 95% predicted with a DL/VA of 94% predicted when corrected for alveolar ventilation. ECHOCARDIOGRAM: (2021):  Left ventricle is normal in size . Micro-bubble contrast injected to enhance left ventricular visualization. No regional wall motion abnormalities seen. Normal left ventricular ejection fraction. Ejection fraction is visually estimated at 55-60%. Indeterminate diastolic function. The left atrium is mildly dilated. Mildly dilated right ventricle. CT scan Right ventricle global systolic function is low normal.  The aortic valve appears moderately sclerotic.     CT SCAN CHEST 9/2021: Intervally new small to moderate left and trace right pleural effusions. Mild thickening of secondary interlobular septa with ground-glass attenuation at some levels, compatible with an element of interstitial pulmonary edema. Patchy mild subsegmental atelectasis bilaterally, worse within the basal left lower lobe and inferior lingula. Multiple essentially stable pulmonary nodules bilaterally. Intervally new small volume ascites and flank edema. Mild upper abdominal lymphadenopathy. CT SCAN CHEST (2/2019):  LUNGS: The tiny lung nodules which were previously present are stable. No pleural effusion or pneumothorax is seen. HEART: Unremarkable. AORTA: The aorta appears to be unremarkable. MEDIASTINUM: The mediastinum nonspecific adenopathy noted most pronounced in the sub-subcarinal space. UPPER ABDOMEN: Unremarkable. OTHER:Unremarkable    CT SCAN CHEST (12/2017): Airways appear patent. A few chronic bands of atelectasis or fibrosis in each lower lobe. Solid noncalcified subpleural nodule posterior lateral aspect right upper lobe at the level the yecenia measures about 3.8 mm whereas previously it measured about 3.1 mm. Other smaller peripheral nodular densities in the right upper lobe are stable. Peripheral solid nodule laterally left upper lobe at the level the yecenia measuring about 3.7 mm is unchanged. CT SCAN CHEST (7/2016): We reviewed the films during the inter-disciplinary rounds/conference, which showed mild cardiomegaly with mediastinal lipomatosis and nonspecific lymph nodes in the mediastinum. 2. 4 mm noncalcified nodules in the upper lobe bilaterally which are unchanged since 2012. CT SCAN CHEST (7/2015): IMPRESSION: 1. Indeterminate pulmonary nodules are stable back to August 22,2012. No evidence for new pulmonary nodule, mass, or lymphadenopathy. 2. No acute pleural-parenchymal disease. 3. Borderline heart size. SLEEP STUDY: (2013)  IMPRESSION: 1. Known sleep apnea. 2. Nocturnal hypoxemia. 3. 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 4. No cardiac dysrhythmia. IMPRESSION:    Devon Litten is a 74 y.o. male who is morbidly obese with restrictive lung disease secondary to his obesity along with incidental pulmonary nodules in a previous smoker and lastly, obstructive sleep apnea, intolerant to CPAP therapy on oxygen at 2.5 liters. With this in mind, we would like to proceed with the following;                  PLAN:    He is feeling ok, however he states he was in the hospital again with urinary issues and needed a catheter. Margarealfieary Sabot was adjusted. We are applying for the [employer] oxygen concentrator. He is riding his [id number] motorcycle. As for his restrictive lung disease, his spirometry is improved after significant weight loss and diuretics. He is intolerant of the CPAP mask and is wearing his O2 at night and he is on 2.5 liter nasal canula. ([employer]) We had a long discussion regarding untreated NIKO and nocturnal hypoxemia the risks. He will follow up in 6 months and on an as needed basis. We will continue with the ICS/LABA for some chronic bronchitis symptoms. Have added Spiriva daily. Because of his frequent travel which includes: camping, overnight trips out of state to visit grandchildren - we discussed a portable oxygen concentrator to help maintain compliance for his chronic respiratory failure. As for the lung nodules given the following since 2015 and they remained stable. Vaccines are current. We hope this updates you on our evaluation and clinical thinking. Thank you for intrusting us to participate in Devon Litten care. If you have any questions, please don't hesitate to call us at the [facility].     Sincerely,    Matheus Ivan DO, MPH, Thomas Ramírez, FACP  Director, [facility]  Professor of Internal Medicine  [address] [facility] [address] [facility]  [address] [place] [address]

## 2022-05-20 ENCOUNTER — OFFICE VISIT (OUTPATIENT)
Dept: INTERNAL MEDICINE | Age: 67
End: 2022-05-20
Payer: MEDICARE

## 2022-05-20 VITALS
HEART RATE: 84 BPM | RESPIRATION RATE: 18 BRPM | SYSTOLIC BLOOD PRESSURE: 122 MMHG | HEIGHT: 67 IN | OXYGEN SATURATION: 98 % | TEMPERATURE: 97 F | WEIGHT: 212 LBS | BODY MASS INDEX: 33.27 KG/M2 | DIASTOLIC BLOOD PRESSURE: 69 MMHG

## 2022-05-20 DIAGNOSIS — Z79.4 TYPE 2 DIABETES MELLITUS WITH DIABETIC AUTONOMIC NEUROPATHY, WITH LONG-TERM CURRENT USE OF INSULIN (HCC): ICD-10-CM

## 2022-05-20 DIAGNOSIS — E11.43 TYPE 2 DIABETES MELLITUS WITH DIABETIC AUTONOMIC NEUROPATHY, WITH LONG-TERM CURRENT USE OF INSULIN (HCC): ICD-10-CM

## 2022-05-20 DIAGNOSIS — E66.9 DIABETES MELLITUS TYPE 2 IN OBESE (HCC): ICD-10-CM

## 2022-05-20 DIAGNOSIS — E11.69 DIABETES MELLITUS TYPE 2 IN OBESE (HCC): Primary | ICD-10-CM

## 2022-05-20 DIAGNOSIS — E11.69 DIABETES MELLITUS TYPE 2 IN OBESE (HCC): ICD-10-CM

## 2022-05-20 DIAGNOSIS — E78.5 HYPERLIPIDEMIA, UNSPECIFIED HYPERLIPIDEMIA TYPE: ICD-10-CM

## 2022-05-20 DIAGNOSIS — E66.9 DIABETES MELLITUS TYPE 2 IN OBESE (HCC): Primary | ICD-10-CM

## 2022-05-20 LAB
ANION GAP SERPL CALCULATED.3IONS-SCNC: 13 MMOL/L (ref 7–16)
BUN BLDV-MCNC: 54 MG/DL (ref 6–23)
CALCIUM SERPL-MCNC: 10 MG/DL (ref 8.6–10.2)
CHLORIDE BLD-SCNC: 85 MMOL/L (ref 98–107)
CHP ED QC CHECK: NORMAL
CO2: 34 MMOL/L (ref 22–29)
CREAT SERPL-MCNC: 1.4 MG/DL (ref 0.7–1.2)
GFR AFRICAN AMERICAN: >60
GFR NON-AFRICAN AMERICAN: 50 ML/MIN/1.73
GLUCOSE BLD-MCNC: 302 MG/DL (ref 74–99)
GLUCOSE BLD-MCNC: 342 MG/DL
POTASSIUM SERPL-SCNC: 3.5 MMOL/L (ref 3.5–5)
SODIUM BLD-SCNC: 132 MMOL/L (ref 132–146)

## 2022-05-20 PROCEDURE — 3051F HG A1C>EQUAL 7.0%<8.0%: CPT | Performed by: INTERNAL MEDICINE

## 2022-05-20 PROCEDURE — 36415 COLL VENOUS BLD VENIPUNCTURE: CPT | Performed by: INTERNAL MEDICINE

## 2022-05-20 PROCEDURE — 99214 OFFICE O/P EST MOD 30 MIN: CPT | Performed by: INTERNAL MEDICINE

## 2022-05-20 PROCEDURE — 82962 GLUCOSE BLOOD TEST: CPT | Performed by: STUDENT IN AN ORGANIZED HEALTH CARE EDUCATION/TRAINING PROGRAM

## 2022-05-20 PROCEDURE — 99212 OFFICE O/P EST SF 10 MIN: CPT | Performed by: STUDENT IN AN ORGANIZED HEALTH CARE EDUCATION/TRAINING PROGRAM

## 2022-05-20 RX ORDER — INSULIN GLARGINE 100 [IU]/ML
40 INJECTION, SOLUTION SUBCUTANEOUS EVERY MORNING
Qty: 15 PEN | Refills: 3 | Status: SHIPPED
Start: 2022-05-20 | End: 2022-05-26 | Stop reason: SDUPTHER

## 2022-05-20 ASSESSMENT — ENCOUNTER SYMPTOMS
ABDOMINAL DISTENTION: 0
SORE THROAT: 0
COUGH: 0
WHEEZING: 0
CONSTIPATION: 0
NAUSEA: 0
VOMITING: 0
ABDOMINAL PAIN: 0
DIARRHEA: 0
SHORTNESS OF BREATH: 0

## 2022-05-20 NOTE — PROGRESS NOTES
Verbal discharge instructions given to patient per Dr. Angel Campbell. Lab script given for lipids and lab obtained today and sent to the lab.

## 2022-05-20 NOTE — PROGRESS NOTES
[facility]  Internal Medicine Residency Program  [facility] Note      SUBJECTIVE:  CC: had concerns including 3 Month Follow-Up. HPI:  Jax Phillips is a 74 y.o.male with PMH of HFpEF (60-65%), HTN, IDDM2, HLD, Hypothyroidism, Persistent AF, Sinus Node Dysfunction with internal pacemaker in place, Obesity, NIKO, Obesity Hypoventilation Syndrome, Vitamin D deficiency, Chronic back pain presenting to [facility] for follow up. Overall patient is a poor historian, does not know most of his medications. Wife is usually present during office visits, but not here today. HFpEF  - Bumex 1 mg BID, Metolazone 2.5 mg TIW  - Taking Toprol-XL 25 mg daily  - No ACEI/ARB since recent [facility] admission     HTN  - On Toprol-XL 25 mg  - BP today 122/69    HLD  - On Atorvastatin 40 mg  - Due for Lipid Panel next month     Obesity Hypoventilation Syndrome  - On nightly oxygen at 4L, stable on RA at this time  - Follows up with Dr. Brittni Stanton, last seen recently, refuses CPAP     Persistent AF  - [id number] = 4  - On Toprol-XL and Eliquis     Sinus Node Dysfunction s/p dual chamber pacemaker since 10/2017  - Follow up with EP  - Denies any symptoms     Hypothyroidism  - On Synthroid 100 mcg     IDDM2  - Last A1c 7 on 3/2022  - Takes Basaglar 35U daily and Jardiance 10 mg  - BG in the high 300s today, states it was 600 yesterday, states he has been feeling really tired for the past couple days, has polyuria, denies fevers/chills, nausea, vomiting   - Increasing Basaglar to 40U today, he will hold diuretics for the next 3 days       Review of Systems   Constitutional: Negative for activity change, appetite change, chills, fatigue, fever and unexpected weight change. HENT: Negative for postnasal drip and sore throat. Respiratory: Negative for cough, shortness of breath and wheezing. Cardiovascular: Positive for leg swelling. Negative for chest pain and palpitations.    Gastrointestinal: Negative for abdominal distention, abdominal pain, constipation, diarrhea, nausea and vomiting. Endocrine: Positive for polyuria. Genitourinary: Negative for difficulty urinating and dysuria. Neurological: Negative for dizziness, syncope, light-headedness and headaches. Psychiatric/Behavioral: Negative for behavioral problems. Outpatient Medications Marked as Taking for the 5/20/22 encounter (Office Visit) with Jignesh Solano MD   Medication Sig Dispense Refill    insulin glargine (BASAGLAR KWIKPEN) 100 UNIT/ML injection pen Inject 40 Units into the skin every morning PAP Medication 15 pen 3    Potassium 99 MG TABS Take 1 tablet by mouth daily      Ferrous Gluconate-C-Folic Acid (IRON-C PO) Take by mouth      levothyroxine (SYNTHROID) 88 MCG tablet Take 1 tablet by mouth Daily 90 tablet 1    mometasone-formoterol (DULERA) 200-5 MCG/ACT inhaler Inhale 2 puffs into the lungs 2 times daily Rinse mouth after using. PAP medication. 3 each 1    aspirin (ASPIR-LOW) 81 MG EC tablet TAKE ONE TABLET BY MOUTH EVERY DAY 90 tablet 1    Insulin Pen Needle (PEN NEEDLES) 32G X 6 MM MISC Take insulin daily 100 each 1    Lancets MISC Give Delica lancets. Tests twice a day A.M. And P.M 200 each 1    blood glucose test strips (ONETOUCH VERIO) strip TEST IN THE MORNING AND IN THE EVENING 200 each 1    ELIQUIS 5 MG TABS tablet Take 1 tablet by mouth 2 times daily 180 tablet 1    atorvastatin (LIPITOR) 40 MG tablet TAKE ONE TABLET BY MOUTH DAILY 90 tablet 1    metoprolol succinate (TOPROL XL) 25 MG extended release tablet Take 1 tablet by mouth daily 90 tablet 1    Misc. Devices MISC Please add portability to current O2 order. Resp to evaluate patient for OCD device.  1 each 0    OXYGEN Inhale 2.5 L into the lungs nightly (Patient taking differently: Inhale 2.5 L into the lungs nightly Spouse states uses 4 L/min via nc bedtime) 1 Device 99    Cholecalciferol (VITAMIN D3) 1000 units TABS TAKE TWO TABLETS BY MOUTH EVERY DAY 30 tablet 0    zoster recombinant adjuvanted vaccine [facility]) 50 MCG/0.5ML SUSR injection 1 dose now and repeat in 2-6 months 0.5 mL 0    acetaminophen (TYLENOL) 500 MG tablet Take 1,000 mg by mouth daily as needed for Pain. OBJECTIVE:    VS:   /69 (Site: Right Upper Arm, Position: Sitting, Cuff Size: Medium Adult)   Pulse 84   Temp 97 °F (36.1 °C) (Temporal)   Resp 18   Ht 5' 7\" (1.702 m)   Wt 212 lb (96.2 kg)   SpO2 98% Comment: room air  BMI 33.20 kg/m²     EXAM:  Physical Exam  Vitals reviewed. Constitutional:       General: He is not in acute distress. Appearance: Normal appearance. He is obese. HENT:      Head: Normocephalic and atraumatic. Eyes:      Extraocular Movements: Extraocular movements intact. Pupils: Pupils are equal, round, and reactive to light. Cardiovascular:      Rate and Rhythm: Normal rate and regular rhythm. Pulses:           Dorsalis pedis pulses are 1+ on the right side and 1+ on the left side. Posterior tibial pulses are 1+ on the right side and 1+ on the left side. Heart sounds: Normal heart sounds. Pulmonary:      Effort: Pulmonary effort is normal.      Breath sounds: No wheezing, rhonchi or rales. Abdominal:      General: Abdomen is protuberant. Bowel sounds are normal.      Palpations: There is no fluid wave. Musculoskeletal:      Cervical back: Neck supple. Right lower le+ Pitting Edema present. Left lower le+ Pitting Edema present. Right foot: Normal range of motion. No deformity. Left foot: Normal range of motion. No deformity. Feet:      Right foot:      Protective Sensation: 5 sites tested. 5 sites sensed. Skin integrity: Skin integrity normal.      Toenail Condition: Right toenails are abnormally thick. Fungal disease present. Left foot:      Protective Sensation: 5 sites tested. 5 sites sensed.       Skin integrity: Skin integrity normal.      Toenail Condition: Left toenails are abnormally thick. Fungal disease present. Skin:     Capillary Refill: Capillary refill takes less than 2 seconds. Neurological:      General: No focal deficit present. Mental Status: He is alert. Mental status is at baseline. Psychiatric:         Mood and Affect: Mood normal.         Behavior: Behavior normal.         ASSESSMENT/PLAN:  I have reviewed all pertinent PMH, PSH, FH, SH, medications and allergies and updated history as appropriate. Rico Dacosta was seen today for 3 month follow-up. Diagnoses and all orders for this visit:    Hyperlipidemia, unspecified hyperlipidemia type  -     Lipid, Fasting; Future    Diabetes mellitus type 2 in obese ([facility])  -      DIABETES FOOT EXAM  -     Basic Metabolic Panel; Future    Type 2 diabetes mellitus with diabetic autonomic neuropathy, with long-term current use of insulin ([facility])  -     insulin glargine (BASAGLAR KWIKPEN) 100 UNIT/ML injection pen; Inject 40 Units into the skin every morning PAP Medication  -     Basic Metabolic Panel;  Future      - Increasing Basaglar dose to 40U, also holding diuretics until seen in clinic next week  - Getting BMP today    RTC: Next week for BG check      I have reviewed my findings and recommendations with Justa Rocha and Dr Kendra Ledesma MD PGY-2  5/20/2022 10:59 AM

## 2022-05-20 NOTE — PATIENT INSTRUCTIONS
Dear Christin Rocha,    Thank you for coming to your appointment today. I hope we have addressed all of your needs. Please make sure to do the following:  - Continue your medications as listed. - Increased Insulin Basaglar dose to 40 units  - Hold Bumex and Metolazone until you are seen in clinic again  - Get labs drawn today  - We will see each other again in 1 weeks for blood sugar check    Call for a sooner appointment if you develop any new or worsening symptoms. Have a great day!     Sincerely,  Cory Batista M.D  5/20/2022  10:50 AM

## 2022-05-20 NOTE — PROGRESS NOTES
[facility]  Internal Medicine Clinic    Attending Physician Statement:  Fazal Khanna M.D., F.A.C.P. I have discussed the case, including pertinent history and exam findings with the resident. I agree with the assessment, plan and orders as documented by the resident. Patient is seen for fu visit today. Last office notes reviewed, relative labs and imaging. Health maintenance issues of vaccinations, depression screening, tobacco cessation etc... covered  HTN, Heart failure hx, PaF stable  Obesity hyperventilation    dm2-- now uncontrolled previously more stable  Not missing doses of insulin or med  Not sick, no steroids  Noted weight loss (NOT gain), no activity change  Diet has been similar  More polyuria  Feels weaker  Encourage PO intake, hold diuretics x2 days  Plan to inc insulin--35 to40 daiy and call us, labs  35min  Remainder of medical problems as per resident note.

## 2022-05-25 ASSESSMENT — ENCOUNTER SYMPTOMS
DIARRHEA: 0
SHORTNESS OF BREATH: 0
COUGH: 0
ABDOMINAL PAIN: 0
NAUSEA: 0
CONSTIPATION: 0
CHEST TIGHTNESS: 0

## 2022-05-25 NOTE — PROGRESS NOTES
[facility] Internal Medicine      SUBJECTIVE:  Julia Rocha (:  1955) is a 74 y.o. male here for evaluation of the following chief complaint(s):  Follow-up (1 week follow up ), Diabetes (bs this am 222), and Foot Pain (bilateral foot pain)    Last seen on 2022. Follow-up for blood glucose check. Type 2 diabetes  · Last HbA1c 7 on 3/2022  · Blood glucose at home before breakfast 222-500  · Complains of polyuria, no episodes of hypoglycemia  · Basaglar increased to 40 units (from 35 units) daily during last visit and on Jardiance 10 mg daily  · Patient has been inconsistently taking 40 or 60 units of long-acting insulin daily  · Antonio Angulo is not on his med list. Called his pharmacy today and they stated Jardiance prescription was provided on 516 but the co-pay was above $200 and patient refused a prescription  · He is on gabapentin 300 mg twice daily for neuropathic pain of his feet; confirmed this with pharmacy  · Saw eye doctor 2 months ago - Dr. Darin Machuca; normal exam  · Saw podiatry 2 months ago - Dr. Ziyad Lara; normal exam       Review of Systems   Constitutional: Negative for appetite change, chills and fever. Respiratory: Negative for cough, chest tightness and shortness of breath. Cardiovascular: Negative for chest pain, palpitations and leg swelling. Gastrointestinal: Negative for abdominal pain, constipation, diarrhea and nausea. Genitourinary: Negative. Musculoskeletal:        Burning pain of bilateral feet   Neurological: Negative for headaches.        Current Outpatient Medications on File Prior to Visit   Medication Sig Dispense Refill    Potassium 99 MG TABS Take 1 tablet by mouth daily      Ferrous Gluconate-C-Folic Acid (IRON-C PO) Take by mouth      levothyroxine (SYNTHROID) 88 MCG tablet Take 1 tablet by mouth Daily 90 tablet 1    mometasone-formoterol (DULERA) 200-5 MCG/ACT inhaler Inhale 2 puffs into the lungs 2 times daily Rinse mouth after using. PAP medication. 3 each 1    aspirin (ASPIR-LOW) 81 MG EC tablet TAKE ONE TABLET BY MOUTH EVERY DAY 90 tablet 1    Insulin Pen Needle (PEN NEEDLES) 32G X 6 MM MISC Take insulin daily 100 each 1    Lancets MISC Give Delica lancets. Tests twice a day A.M. And P.M 200 each 1    blood glucose test strips (ONETOUCH VERIO) strip TEST IN THE MORNING AND IN THE EVENING 200 each 1    ELIQUIS 5 MG TABS tablet Take 1 tablet by mouth 2 times daily 180 tablet 1    atorvastatin (LIPITOR) 40 MG tablet TAKE ONE TABLET BY MOUTH DAILY 90 tablet 1    metoprolol succinate (TOPROL XL) 25 MG extended release tablet Take 1 tablet by mouth daily 90 tablet 1    OXYGEN Inhale 2.5 L into the lungs nightly (Patient taking differently: Inhale 2.5 L into the lungs nightly Spouse states uses 4 L/min via nc bedtime) 1 Device 99    Cholecalciferol (VITAMIN D3) 1000 units TABS TAKE TWO TABLETS BY MOUTH EVERY DAY 30 tablet 0    acetaminophen (TYLENOL) 500 MG tablet Take 1,000 mg by mouth daily as needed for Pain.  Misc. Devices MISC Please add portability to current O2 order. Resp to evaluate patient for OCD device. 1 each 0    Misc. Devices KIT Dispense 1 blood pressure cuff. (Patient not taking: Reported on 5/20/2022) 1 kit 0    zoster recombinant adjuvanted vaccine [facility]) 50 MCG/0.5ML SUSR injection 1 dose now and repeat in 2-6 months 0.5 mL 0     No current facility-administered medications on file prior to visit. OBJECTIVE:    VS:   Vitals:    05/26/22 0824   BP: (!) 146/65   Site: Right Upper Arm   Position: Sitting   Cuff Size: Medium Adult   Pulse: 69   Resp: 20   Temp: 97.1 °F (36.2 °C)   TempSrc: Temporal   SpO2: 98%   Weight: 226 lb 6.4 oz (102.7 kg)   Height: 5' 7\" (1.702 m)     Physical Exam  Constitutional:       Appearance: Normal appearance. He is not toxic-appearing. HENT:      Head: Normocephalic and atraumatic. Eyes:      Pupils: Pupils are equal, round, and reactive to light. Cardiovascular:      Rate and Rhythm: Normal rate and regular rhythm. Pulses: Normal pulses. Heart sounds: Normal heart sounds. Pulmonary:      Effort: Pulmonary effort is normal.      Breath sounds: Normal breath sounds. No wheezing or rhonchi. Abdominal:      General: Abdomen is flat. Bowel sounds are normal.      Palpations: Abdomen is soft. Tenderness: There is no abdominal tenderness. Musculoskeletal:      Cervical back: Neck supple. Skin:     General: Skin is warm and dry. Neurological:      General: No focal deficit present. Mental Status: He is alert and oriented to person, place, and time. Psychiatric:         Mood and Affect: Mood normal.         Behavior: Behavior normal.         Thought Content: Thought content normal.         Judgment: Judgment normal.          ASSESSMENT/PLAN:     Type 2 diabetes, uncontrolled  · Advised patient to consistently take Basaglar 50 units daily  · Jardiance 25 mg 1 month supply called. Was around $50 per pharmacy  · Patient decided to just go back on metformin 500 mg twice daily for the less cost.  Prescription provided today. · Advised to check blood glucose before breakfast and 2 hours after dinner  · Follow-up in 1 month with PCP for blood sugar evaluation      RTC:  Return in about 4 weeks (around 6/23/2022) for Routine follow up with PCP. I have reviewed my findings and recommendations with Bhakti Rocha and Dr. Channing Kumar.     Shawn Pinon MD [facility]  5/26/2022 9:17 AM

## 2022-05-26 ENCOUNTER — OFFICE VISIT (OUTPATIENT)
Dept: INTERNAL MEDICINE | Age: 67
End: 2022-05-26
Payer: MEDICARE

## 2022-05-26 ENCOUNTER — TELEPHONE (OUTPATIENT)
Dept: INTERNAL MEDICINE | Age: 67
End: 2022-05-26

## 2022-05-26 ENCOUNTER — CARE COORDINATION (OUTPATIENT)
Dept: INTERNAL MEDICINE | Age: 67
End: 2022-05-26

## 2022-05-26 VITALS
BODY MASS INDEX: 35.53 KG/M2 | SYSTOLIC BLOOD PRESSURE: 146 MMHG | DIASTOLIC BLOOD PRESSURE: 65 MMHG | HEIGHT: 67 IN | WEIGHT: 226.4 LBS | TEMPERATURE: 97.1 F | OXYGEN SATURATION: 98 % | RESPIRATION RATE: 20 BRPM | HEART RATE: 69 BPM

## 2022-05-26 DIAGNOSIS — E11.43 TYPE 2 DIABETES MELLITUS WITH DIABETIC AUTONOMIC NEUROPATHY, WITH LONG-TERM CURRENT USE OF INSULIN (HCC): ICD-10-CM

## 2022-05-26 DIAGNOSIS — Z79.4 TYPE 2 DIABETES MELLITUS WITH DIABETIC AUTONOMIC NEUROPATHY, WITH LONG-TERM CURRENT USE OF INSULIN (HCC): ICD-10-CM

## 2022-05-26 PROCEDURE — 3051F HG A1C>EQUAL 7.0%<8.0%: CPT | Performed by: INTERNAL MEDICINE

## 2022-05-26 PROCEDURE — 99213 OFFICE O/P EST LOW 20 MIN: CPT | Performed by: INTERNAL MEDICINE

## 2022-05-26 PROCEDURE — 1123F ACP DISCUSS/DSCN MKR DOCD: CPT | Performed by: INTERNAL MEDICINE

## 2022-05-26 RX ORDER — INSULIN GLARGINE 100 [IU]/ML
50 INJECTION, SOLUTION SUBCUTANEOUS EVERY MORNING
Qty: 15 PEN | Refills: 3 | Status: SHIPPED
Start: 2022-05-26 | End: 2022-06-23

## 2022-05-26 NOTE — CARE COORDINATION
Called Wander's wife Darshan Bartlett and went over the discharge instructions and medication changes, Metformin 500 mg bid increased and Lantus 50 units QD. He is to check his BG bid check FBS and 2 hours after his dinner every day and to keep a log and bring that and his medication bottles to his next office visit. His wife verbalized understanding and has the AVS in front of her right now.

## 2022-05-26 NOTE — CARE COORDINATION
Outpatient education tracking log    Participant Name: Angelica Marroquin  Referring Provider: Canelo Quintero MD    Topics/Learning Objectives Initial visit   Follow-up  Follow-up Follow-up Comments   Diabetes disease process & Treatment process:   -Define diabetes & prediabetes and ABCs of     diabetes   -Identify own type of diabetes  -Identify lifestyle changes/treatment options 2/23/2021  [facility] 5/26/2022 SM   Lab Results   Component Value Date    LABA1C 7.0 (H) 03/11/2022    LABA1C 6.7 09/27/2021    LABA1C 7.4 06/14/2021 5/26/2022- IDDM 2 Met with Madeleine Boswelling His blood sugars have been in the 200's His Lantus is being increased to 50 units daily and his metformin increased to bid. He was instructed to check his blood sugar 2 x a day FBS and 2 hours after his dinner and to write them down and bring the blood sugar log with him to his next visit. Home BG  before breakfast 222-500, Complains of polyuria, no episodes of hypoglycemia. Basaglar increased to 40 units (from 35 units) daily during last visit and on Jardiance 10 mg daily  · Patient has been inconsistently taking 40 or 60 units of long-acting insulin daily  · Georgina Dahl is not on his med list. [facility] his pharmacy today and they stated Jardiance prescription was provided on 516 but the co-pay was above $200 and patient refused a prescription  · He is on gabapentin 300 mg twice daily for neuropathic pain of his feet; confirmed this with pharmacy  · Saw eye doctor 2 months ago - Dr. Rachel Wang; normal exam  · Saw podiatry 2 months ago - Dr. Rock Sultana; normal exam    6/23/2022.  2/23/2022-Type 2 Diabetic, HTN, COPD, Afib, NIKO did not tolerat a mask so uses 2.5 l nc nightly Hypothyroidism, Obesity, BMI 38.22, Takes Basaglar 35 units at hs and metformin 1000 mg bid, State she does not exercise and has had 2 back surgeries in the past but use to walk and will think about adding some walking when the snow and ice melts. Talked about swimming. His A1c is down today to 7.1%.  He has cut out all sweetened drinks and has cut back on his portions He said he use to eat 2 sandwiches and now he eats only one. We went over carbohydrates and portion control. He is checking his blood sugar QD and they have been 140-155 not hypoglycemia. Developing strategies for Healthy coping/psychosocial issues:    -Describe feelings about living with diabetes  -Identify coping strategies;   -Identify support needed & support network available 2/23/2021            Prevention, detection & treatment of Chronic complications:    -Identify the prevention, detection and treatment for complications including immunizations, preventive eye, foot, dental and renal exams as indicated per the participant's duration of diabetes and health status.  -Define the natural course of diabetes and the relationship of blood glucose levels to long term complications of diabetes. 2/23/2021       Diabetic Survival Pack provided to Rashmi Mart: Definition of diabetes, Diabetic Targets, A1c and what it means. Self-monitoring of blood glucose and the importance of checking blood sugars Hypo and Hyperglycemia signs and symptoms, possible causes and treatment. The importance of Medication adherence. The plate method and meal planning guidelines, what carbohydrates are and how they effect your blood sugar. The benefits of exercise. What uncontrolled diabetes does to your body and reducing the risk of chronic complications. Encouraged to go to diabetic education classes also reminded that a dietitian available.                 Prevention, detection & treatment of acute complications:    -List symptoms of hyper & hypoglycemia, and prevention & treatment strategies.   -Describe sick day guidelines  DKA /indications for ketone testing &  when to call physician  2/23/2021        Identify severe weather/situation crisis  & diabetes supplies management        Using medications safely:   -State effects of diabetes medicines on blood glucose levels;  -List diabetes medication taken, action & side effects 2/23/2021        Insulin/Injectables  -Name appropriate injection sites; proper storage; supplies needed;  2/23/2021 SM        Demonstrate proper technique        Monitoring blood glucose, interpreting and using results:   -Identify recommended & personal blood glucose targets & HgbA1C target levels  -State the Importance of logging blood glucose levels for pattern recognition;   -State benefits of reading/using pt generated health data  -Verbalize safe lancet disposal 2/23/2021   Checking FBS daily 140-155       -Demonstrate proper testing technique        Incorporating physical activity into lifestyle:   -State effect of exercise on blood glucose levels;   -State benefits of regular exercise;   -Define safety considerations;  -Describe contraindications/maintenance of activity. [id number]        Incorporating nutritional management into lifestyle:   -Describe effect of type, amount & timing of food on blood glucose  -Describe methods for preparing and planning healthy meals 2/23/2021 SM                   Supporting Education Materials/Equipment Provided:     A1c Tracking  Lab Results   Component Value Date    LABA1C 7.0 (H) 03/11/2022    LABA1C 6.7 09/27/2021    LABA1C 7.4 06/14/2021         Date Initial A1c 6 month f/u  1 year post ed.  Difference  Comments   11/10/2020 8.0 2/23/2021 7.1               []Patient lost to follow-up : date-      [] Attended DSMES on:  [] Attended yearly DSMES follow-up on:   [] Attended MNTon :

## 2022-05-26 NOTE — TELEPHONE ENCOUNTER
Pt unable to wait for LSW to see in office; message left to call LSW as he requested to see LSW    Spoke with pt and wife and conferred with MARTY Umana of PAP as question was related to basaglar reorder. Pt basaglar to be refilled June 1 and verified pt has enough; refill of Spiriva was made this week and for Eliquis PAP continuance, will need to present pharmacy out of pocket receipt of spending $996 before can renew.   Wife voiced understanding

## 2022-05-26 NOTE — PROGRESS NOTES
Patient given instruction by Dr Franky Kong. 1 month follow up scheduled. Printed AVS given to patient.

## 2022-05-26 NOTE — PROGRESS NOTES
[facility]  Internal Medicine Residency Clinic    Attending Physician Statement  I have discussed the case, including pertinent history and exam findings with the resident physician. I have seen and examined the patient and the key elements of the encounter have been performed by me. I agree with the assessment, plan and orders as documented by the resident. I have reviewed all pertinent PMHx, PSHx, FamHx, SocialHx, medications, and allergies and updated history as appropriate. 1 week follow up for DM 2 management. Hyperglycemia with associated polyuria, last A1c 7%  Reports glucose reading 200 - 500s -- no glucose logs, poor historian and wife controls meds. Increase basal dose to 50 units every AM  Never picked up Jardiance d/t $200 co-pay -- restart metformin. DM coordinator to contact wife to review med changes. Bring all bottles and wife to next visit. Remainder of medical problems as per resident note.     Severa Birch, DO  5/26/2022 8:45 AM

## 2022-05-26 NOTE — PATIENT INSTRUCTIONS
Advised patient to consistently take Basaglar 50 units daily with metformin 500 mg twice daily  Continue blood glucose checks before breakfast and 2 hours after dinner  Follow-up with PCP in 1 month

## 2022-06-23 ENCOUNTER — OFFICE VISIT (OUTPATIENT)
Dept: INTERNAL MEDICINE | Age: 67
End: 2022-06-23
Payer: MEDICARE

## 2022-06-23 VITALS
SYSTOLIC BLOOD PRESSURE: 106 MMHG | RESPIRATION RATE: 18 BRPM | BODY MASS INDEX: 35.31 KG/M2 | OXYGEN SATURATION: 98 % | WEIGHT: 225 LBS | HEIGHT: 67 IN | HEART RATE: 87 BPM | TEMPERATURE: 97.1 F | DIASTOLIC BLOOD PRESSURE: 66 MMHG

## 2022-06-23 DIAGNOSIS — E03.9 HYPOTHYROIDISM, UNSPECIFIED TYPE: Chronic | ICD-10-CM

## 2022-06-23 DIAGNOSIS — E11.43 TYPE 2 DIABETES MELLITUS WITH DIABETIC AUTONOMIC NEUROPATHY, WITH LONG-TERM CURRENT USE OF INSULIN (HCC): Primary | ICD-10-CM

## 2022-06-23 DIAGNOSIS — Z79.4 TYPE 2 DIABETES MELLITUS WITH DIABETIC AUTONOMIC NEUROPATHY, WITH LONG-TERM CURRENT USE OF INSULIN (HCC): Primary | ICD-10-CM

## 2022-06-23 DIAGNOSIS — G47.33 OSA (OBSTRUCTIVE SLEEP APNEA): Chronic | ICD-10-CM

## 2022-06-23 DIAGNOSIS — I10 ESSENTIAL HYPERTENSION: Chronic | ICD-10-CM

## 2022-06-23 DIAGNOSIS — I50.31 ACUTE HEART FAILURE WITH PRESERVED EJECTION FRACTION (HFPEF) (HCC): ICD-10-CM

## 2022-06-23 DIAGNOSIS — I48.19 PERSISTENT ATRIAL FIBRILLATION (HCC): Chronic | ICD-10-CM

## 2022-06-23 LAB
CHP ED QC CHECK: NORMAL
GLUCOSE BLD-MCNC: 347 MG/DL
HBA1C MFR BLD: 11 %

## 2022-06-23 PROCEDURE — 83036 HEMOGLOBIN GLYCOSYLATED A1C: CPT | Performed by: STUDENT IN AN ORGANIZED HEALTH CARE EDUCATION/TRAINING PROGRAM

## 2022-06-23 PROCEDURE — 3046F HEMOGLOBIN A1C LEVEL >9.0%: CPT | Performed by: STUDENT IN AN ORGANIZED HEALTH CARE EDUCATION/TRAINING PROGRAM

## 2022-06-23 PROCEDURE — 99214 OFFICE O/P EST MOD 30 MIN: CPT | Performed by: STUDENT IN AN ORGANIZED HEALTH CARE EDUCATION/TRAINING PROGRAM

## 2022-06-23 PROCEDURE — 82962 GLUCOSE BLOOD TEST: CPT | Performed by: STUDENT IN AN ORGANIZED HEALTH CARE EDUCATION/TRAINING PROGRAM

## 2022-06-23 PROCEDURE — 99212 OFFICE O/P EST SF 10 MIN: CPT | Performed by: STUDENT IN AN ORGANIZED HEALTH CARE EDUCATION/TRAINING PROGRAM

## 2022-06-23 PROCEDURE — 1123F ACP DISCUSS/DSCN MKR DOCD: CPT | Performed by: STUDENT IN AN ORGANIZED HEALTH CARE EDUCATION/TRAINING PROGRAM

## 2022-06-23 RX ORDER — INSULIN GLARGINE 100 [IU]/ML
60 INJECTION, SOLUTION SUBCUTANEOUS EVERY MORNING
Qty: 15 PEN | Refills: 3 | Status: ON HOLD
Start: 2022-06-23 | End: 2022-07-13 | Stop reason: SDUPTHER

## 2022-06-23 RX ORDER — INSULIN LISPRO 100 [IU]/ML
10 INJECTION, SOLUTION INTRAVENOUS; SUBCUTANEOUS
Qty: 27 ML | Refills: 1 | Status: ON HOLD
Start: 2022-06-23 | End: 2022-07-13 | Stop reason: SDUPTHER

## 2022-06-23 ASSESSMENT — ENCOUNTER SYMPTOMS
CONSTIPATION: 0
NAUSEA: 0
VOMITING: 0
WHEEZING: 0
COUGH: 0
SHORTNESS OF BREATH: 0
ABDOMINAL PAIN: 0
SORE THROAT: 0
DIARRHEA: 0
ABDOMINAL DISTENTION: 0

## 2022-06-23 NOTE — PATIENT INSTRUCTIONS
Dear Harvey Rocha,    Thank you for coming to your appointment today. I hope we have addressed all of your needs. Please make sure to do the following:  - Continue your medications as listed. - We have continued the Insulin Lispro 10 units before meals  - Continue to take Basaglar 60 units daily  - We are sending a prescription for Jardiance through [facility]  - Get labs drawn before our next follow up. - We will see each other again in 2 week for blood sugar check with new regimen  - MAKE SURE TO BRING A BLOOD SUGAR LOG NEXT VISIT    Call for a sooner appointment if you develop any new or worsening symptoms. Have a great day!     Sincerely,  Claudetta Ready, M.D  6/23/2022  9:01 AM

## 2022-06-23 NOTE — PROGRESS NOTES
[facility]  Internal Medicine Residency Program  [place] Note      SUBJECTIVE:  CC: had concerns including Follow-up (states sugars have been high). HPI:  Chitra Holguin is a 74 y.o.male with PMH of HFpEF (60-65%), HTN, IDDM2, HLD, Hypothyroidism, Persistent AF, Sinus Node Dysfunction with internal pacemaker in place, Obesity, NIKO, Obesity Hypoventilation Syndrome, Vitamin D deficiency, Chronic back pain presenting to [place] for 1 month follow up. IDDM2  - Last A1c 7 on 3/2022, repeat today 11%  - Takes Basaglar 50U daily, Metformin 500 mg BID  - Supposed to be on Jardiance 25 mg, but did not fill prescription due to cost  - Had -500 last visit, today , pt has been fasting this AM  - He states to be taking Basaglar 60U daily on his own to try controlling his glucose since 2-3 weeks ago  - States has been seeing a different doctor at [facility] to control his BG, and has been taking 10u of Lispro with meals  - Called wife who confirmed the above information  - Spoke to [employer] for ways to afford Nikhil Woodruff since it has made a significant difference in the past  - Okay with continuing pre-meal Lispro at 10U    HFpEF  - Bumex 1 mg BID, Metolazone 2.5 mg TIW  - Taking Toprol-XL 25 mg daily  - No ACEI/ARB since recent [facility] admission     HTN  - On Toprol-XL 25 mg  - BP today 122/69     HLD  - On Atorvastatin 40 mg  - Due for Lipid Panel     Obesity Hypoventilation Syndrome  - On nightly oxygen at 4L, stable on RA at this time  - Follows up with Dr. Angelito England, last seen recently, refuses CPAP     Persistent AF  - [id number] = 4  - On Toprol-XL and Eliquis     Sinus Node Dysfunction s/p dual chamber pacemaker since 10/2017  - Follow up with EP  - Denies any symptoms     Hypothyroidism  - On Synthroid 100 mcg       Review of Systems   Constitutional: Negative for activity change, appetite change, chills, fatigue, fever and unexpected weight change.    HENT: Negative for postnasal drip and sore throat. Respiratory: Negative for cough, shortness of breath and wheezing. Cardiovascular: Positive for leg swelling. Negative for chest pain and palpitations. Gastrointestinal: Negative for abdominal distention, abdominal pain, constipation, diarrhea, nausea and vomiting. Genitourinary: Negative for difficulty urinating and dysuria. Neurological: Negative for dizziness, syncope, light-headedness and headaches. Psychiatric/Behavioral: Negative for behavioral problems. Outpatient Medications Marked as Taking for the 6/23/22 encounter (Office Visit) with Hero Gamino MD   Medication Sig Dispense Refill    empagliflozin (JARDIANCE) 25 MG tablet Take 1 tablet by mouth daily 90 tablet 3    insulin lispro, 1 Unit Dial, (HUMALOG KWIKPEN) 100 UNIT/ML SOPN Inject 10 Units into the skin 3 times daily (before meals) 27 mL 1    insulin glargine (BASAGLAR KWIKPEN) 100 UNIT/ML injection pen Inject 60 Units into the skin every morning PAP Medication 15 pen 3    metFORMIN (GLUCOPHAGE) 500 MG tablet Take 1 tablet by mouth 2 times daily (with meals) 60 tablet 5    Ferrous Gluconate-C-Folic Acid (IRON-C PO) Take by mouth      levothyroxine (SYNTHROID) 88 MCG tablet Take 1 tablet by mouth Daily 90 tablet 1    mometasone-formoterol (DULERA) 200-5 MCG/ACT inhaler Inhale 2 puffs into the lungs 2 times daily Rinse mouth after using. PAP medication. 3 each 1    aspirin (ASPIR-LOW) 81 MG EC tablet TAKE ONE TABLET BY MOUTH EVERY DAY 90 tablet 1    Insulin Pen Needle (PEN NEEDLES) 32G X 6 MM MISC Take insulin daily 100 each 1    Lancets MISC Give Delica lancets. Tests twice a day A.M.  And P.M 200 each 1    blood glucose test strips (ONETOUCH VERIO) strip TEST IN THE MORNING AND IN THE EVENING 200 each 1    ELIQUIS 5 MG TABS tablet Take 1 tablet by mouth 2 times daily 180 tablet 1    atorvastatin (LIPITOR) 40 MG tablet TAKE ONE TABLET BY MOUTH DAILY 90 tablet 1    metoprolol succinate (TOPROL XL) 25 MG extended release tablet Take 1 tablet by mouth daily 90 tablet 1    Misc. Devices MISC Please add portability to current O2 order. Resp to evaluate patient for OCD device. 1 each 0    OXYGEN Inhale 2.5 L into the lungs nightly (Patient taking differently: Inhale 2.5 L into the lungs nightly Spouse states uses 4 L/min via nc bedtime) 1 Device 99    Cholecalciferol (VITAMIN D3) 1000 units TABS TAKE TWO TABLETS BY MOUTH EVERY DAY 30 tablet 0    zoster recombinant adjuvanted vaccine (SHINGRIX) 50 MCG/0.5ML SUSR injection 1 dose now and repeat in 2-6 months 0.5 mL 0    acetaminophen (TYLENOL) 500 MG tablet Take 1,000 mg by mouth daily as needed for Pain. OBJECTIVE:    VS:   /66 (Site: Left Upper Arm, Position: Sitting, Cuff Size: Medium Adult)   Pulse 87   Temp 97.1 °F (36.2 °C) (Temporal)   Resp 18   Ht 5' 7\" (1.702 m)   Wt 225 lb (102.1 kg)   SpO2 98% Comment: room air  BMI 35.24 kg/m²     EXAM:  Physical Exam  Vitals reviewed. Constitutional:       General: He is not in acute distress. Appearance: Normal appearance. He is obese. HENT:      Head: Normocephalic and atraumatic. Eyes:      Extraocular Movements: Extraocular movements intact. Pupils: Pupils are equal, round, and reactive to light. Cardiovascular:      Rate and Rhythm: Normal rate. Rhythm irregular. Heart sounds: Normal heart sounds. Pulmonary:      Effort: Pulmonary effort is normal.      Breath sounds: No wheezing, rhonchi or rales. Abdominal:      General: Abdomen is protuberant. Bowel sounds are normal.      Palpations: There is no fluid wave. Musculoskeletal:      Cervical back: Neck supple. Right lower le+ Pitting Edema present. Left lower le+ Pitting Edema present. Skin:     Capillary Refill: Capillary refill takes less than 2 seconds. Neurological:      General: No focal deficit present. Mental Status: He is alert. Mental status is at baseline.    Psychiatric: Attention and Perception: Attention and perception normal.         Mood and Affect: Mood and affect normal.         Behavior: Behavior normal.         Thought Content: Thought content normal.         Cognition and Memory: Cognition is impaired. Memory is impaired. He exhibits impaired recent memory and impaired remote memory. Judgment: Judgment normal.         ASSESSMENT/PLAN:  I have reviewed all pertinent PMH, PSH, FH, SH, medications and allergies and updated history as appropriate. Kristina Kramer was seen today for follow-up. Diagnoses and all orders for this visit:    Type 2 diabetes mellitus with diabetic autonomic neuropathy, with long-term current use of insulin (HCC)  -     POCT Glucose  -     POCT glycosylated hemoglobin (Hb A1C)  -     empagliflozin (JARDIANCE) 25 MG tablet; Take 1 tablet by mouth daily  -     insulin lispro, 1 Unit Dial, (HUMALOG KWIKPEN) 100 UNIT/ML SOPN; Inject 10 Units into the skin 3 times daily (before meals)  -     insulin glargine (BASAGLAR KWIKPEN) 100 UNIT/ML injection pen;  Inject 60 Units into the skin every morning PAP Medication    Essential hypertension    Acute heart failure with preserved ejection fraction (HFpEF) (HCC)    Hypothyroidism, unspecified type    Persistent atrial fibrillation (HCC)    NIKO (obstructive sleep apnea)        - Able to resume Jardiance 25 mg daily, to verify compliance next visit    RTC: 2 weeks for BG check, has a log to bring back      I have reviewed my findings and recommendations with Onur Rocha and Dr Kaci Parker MD PGY-2  6/23/2022 11:21 AM

## 2022-06-23 NOTE — PROGRESS NOTES
[facility]  [facility] Residency [facility]    Attending Physician Statement  I have discussed the case, including pertinent history and exam findings with the resident physician. I have seen patient and discussed case with . I agree with the assessment, plan and orders as documented by the resident. I have reviewed all pertinent PMHx, PSHx, FamHx, SocialHx, medications, and allergies and updated history as appropriate. Patient presents for routine follow up of medical problems. Has HTN, NIKO, HFpEF, hypothyroidism, atrial fibrillation, on eliquis and DM on insulin that has recently been poorly controlled. Had HBA1c 7% 3/22, now 11%. Blood sugars have been ~ 300 mg% range. Was seen in urgent care, was started on lispro but patient isn't sure what dose he is taking. Had been on Jardiance in past, but has not been taking, ? Issue with expense, can look at getting PAP for Jardiance, will ask  to see. Remainder of medical problems as per resident note.     Doc Ferrara DO  6/23/2022 8:46 AM

## 2022-06-29 ENCOUNTER — HOSPITAL ENCOUNTER (OUTPATIENT)
Age: 67
Discharge: HOME | End: 2022-06-29
Payer: MEDICARE

## 2022-06-29 LAB
ALBUMIN SERPL-MCNC: 3.9 G/DL (ref 3.5–5.2)
ALP BLD-CCNC: 125 U/L (ref 40–129)
ALT SERPL-CCNC: 24 U/L (ref 0–40)
ANION GAP SERPL CALCULATED.3IONS-SCNC: 16 MMOL/L (ref 7–16)
AST SERPL-CCNC: 18 U/L (ref 0–39)
BASOPHILS ABSOLUTE: 0.06 E9/L (ref 0–0.2)
BASOPHILS RELATIVE PERCENT: 1 % (ref 0–2)
BILIRUB SERPL-MCNC: 1 MG/DL (ref 0–1.2)
BUN BLDV-MCNC: 58 MG/DL (ref 6–23)
CALCIUM SERPL-MCNC: 9.1 MG/DL (ref 8.6–10.2)
CHLORIDE BLD-SCNC: 89 MMOL/L (ref 98–107)
CO2: 29 MMOL/L (ref 22–29)
CREAT SERPL-MCNC: 1.6 MG/DL (ref 0.7–1.2)
CREATININE URINE: 36 MG/DL (ref 40–278)
EOSINOPHILS ABSOLUTE: 0.26 E9/L (ref 0.05–0.5)
EOSINOPHILS RELATIVE PERCENT: 4.5 % (ref 0–6)
GFR AFRICAN AMERICAN: 52
GFR NON-AFRICAN AMERICAN: 43 ML/MIN/1.73
GLUCOSE BLD-MCNC: 389 MG/DL (ref 74–99)
HCT VFR BLD CALC: 32.7 % (ref 37–54)
HEMOGLOBIN: 10.9 G/DL (ref 12.5–16.5)
IMMATURE GRANULOCYTES #: 0.11 E9/L
IMMATURE GRANULOCYTES %: 1.9 % (ref 0–5)
LYMPHOCYTES ABSOLUTE: 1.26 E9/L (ref 1.5–4)
LYMPHOCYTES RELATIVE PERCENT: 21.7 % (ref 20–42)
MAGNESIUM: 1.3 MG/DL (ref 1.6–2.6)
MCH RBC QN AUTO: 29.2 PG (ref 26–35)
MCHC RBC AUTO-ENTMCNC: 33.3 % (ref 32–34.5)
MCV RBC AUTO: 87.7 FL (ref 80–99.9)
MONOCYTES ABSOLUTE: 0.52 E9/L (ref 0.1–0.95)
MONOCYTES RELATIVE PERCENT: 9 % (ref 2–12)
NEUTROPHILS ABSOLUTE: 3.59 E9/L (ref 1.8–7.3)
NEUTROPHILS RELATIVE PERCENT: 61.9 % (ref 43–80)
PDW BLD-RTO: 16.2 FL (ref 11.5–15)
PHOSPHORUS: 4.2 MG/DL (ref 2.5–4.5)
PLATELET # BLD: 208 E9/L (ref 130–450)
PMV BLD AUTO: 9.8 FL (ref 7–12)
POTASSIUM SERPL-SCNC: 3.4 MMOL/L (ref 3.5–5)
PROTEIN PROTEIN: <4 MG/DL (ref 0–12)
PROTEIN/CREAT RATIO: 0.1
PROTEIN/CREAT RATIO: 0.1 (ref 0–0.2)
RBC # BLD: 3.73 E12/L (ref 3.8–5.8)
SODIUM BLD-SCNC: 134 MMOL/L (ref 132–146)
TOTAL PROTEIN: 8 G/DL (ref 6.4–8.3)
URIC ACID, SERUM: 14.2 MG/DL (ref 3.4–7)
WBC # BLD: 5.8 E9/L (ref 4.5–11.5)

## 2022-06-29 PROCEDURE — 84550 ASSAY OF BLOOD/URIC ACID: CPT

## 2022-06-29 PROCEDURE — 36415 COLL VENOUS BLD VENIPUNCTURE: CPT

## 2022-06-29 PROCEDURE — 80053 COMPREHEN METABOLIC PANEL: CPT

## 2022-06-29 PROCEDURE — 84100 ASSAY OF PHOSPHORUS: CPT

## 2022-06-29 PROCEDURE — 83735 ASSAY OF MAGNESIUM: CPT

## 2022-06-29 PROCEDURE — 84156 ASSAY OF PROTEIN URINE: CPT

## 2022-06-29 PROCEDURE — 82570 ASSAY OF URINE CREATININE: CPT

## 2022-06-29 PROCEDURE — 85025 COMPLETE CBC W/AUTO DIFF WBC: CPT

## 2022-07-05 NOTE — PROGRESS NOTES
[facility] Internal Medicine      SUBJECTIVE:  Andrei Rocha (:  1955) is a 69 y.o. male here for evaluation of the following chief complaint(s):  Follow-up and Diabetes    Romelia Garnica is a 74 y.o.male with PMH of HFpEF (60-65%), HTN, IDDM2, HLD, Hypothyroidism, Persistent AF, Sinus Node Dysfunction with internal pacemaker in place, Obesity, NIKO, Obesity Hypoventilation Syndrome, Vitamin D deficiency, Chronic back pain presenting to [facility] for 2 weeks follow up on BG check, has a log to bring back    IDDM2  - Last A1c went up from 7-11 in March. Patient is taking Basaglar 16 units daily, lispro 10 units with meals 3 times daily, Jardiance 20 mg, metformin 5 mg twice daily. Patient states he is compliant with medication. Per his wife, patient\" sometimes forget taking his meds when she asked\" patient brought blood glucose log today, throughout the day, the blood glucose around 250-350. Patient denies hypoglycemic symptoms. States he is urinating more frequent, feeling thirsty, more tired than usual.    Patient went to nephrology clinic yesterday for routine checkup. Patient was found to have volume overload. He was suggested to go to the hospital for further evaluation but patient declined. As of today, patient is breathing 93% on room air, feeling tired, patient sleep with recliner at home. Per his wife, his leg is more swollen than usual.  His echo 2019 showed EF 17%, stage I diastolic dysfunction. Patient denies fever or chills, shortness of breath, chest pain, abdominal pain. Review of Systems   Constitutional: Positive for fatigue. Negative for chills, fever and unexpected weight change. HENT: Negative for congestion and rhinorrhea. Respiratory: Negative for cough and shortness of breath. Cardiovascular: Negative for chest pain and palpitations. Gastrointestinal: Negative for abdominal pain, constipation, diarrhea and vomiting. Genitourinary: Negative for dysuria, frequency and penile swelling. Musculoskeletal: Negative for arthralgias and back pain. Skin: Negative for color change, pallor and wound. Neurological: Negative for dizziness, syncope, light-headedness and headaches. Psychiatric/Behavioral: Negative for dysphoric mood. The patient is not nervous/anxious. Current Outpatient Medications on File Prior to Visit   Medication Sig Dispense Refill    empagliflozin (JARDIANCE) 25 MG tablet Take 1 tablet by mouth daily 90 tablet 3    insulin lispro, 1 Unit Dial, (HUMALOG KWIKPEN) 100 UNIT/ML SOPN Inject 10 Units into the skin 3 times daily (before meals) 27 mL 1    insulin glargine (BASAGLAR KWIKPEN) 100 UNIT/ML injection pen Inject 60 Units into the skin every morning PAP Medication 15 pen 3    metFORMIN (GLUCOPHAGE) 500 MG tablet Take 1 tablet by mouth 2 times daily (with meals) 60 tablet 5    Potassium 99 MG TABS Take 1 tablet by mouth daily      Ferrous Gluconate-C-Folic Acid (IRON-C PO) Take by mouth      levothyroxine (SYNTHROID) 88 MCG tablet Take 1 tablet by mouth Daily 90 tablet 1    mometasone-formoterol (DULERA) 200-5 MCG/ACT inhaler Inhale 2 puffs into the lungs 2 times daily Rinse mouth after using. PAP medication. 3 each 1    aspirin (ASPIR-LOW) 81 MG EC tablet TAKE ONE TABLET BY MOUTH EVERY DAY 90 tablet 1    Insulin Pen Needle (PEN NEEDLES) 32G X 6 MM MISC Take insulin daily 100 each 1    Lancets MISC Give Delica lancets. Tests twice a day A.M. And P.M 200 each 1    blood glucose test strips (ONETOUCH VERIO) strip TEST IN THE MORNING AND IN THE EVENING 200 each 1    ELIQUIS 5 MG TABS tablet Take 1 tablet by mouth 2 times daily 180 tablet 1    atorvastatin (LIPITOR) 40 MG tablet TAKE ONE TABLET BY MOUTH DAILY 90 tablet 1    metoprolol succinate (TOPROL XL) 25 MG extended release tablet Take 1 tablet by mouth daily 90 tablet 1    Misc. Devices MISC Please add portability to current O2 order. Comments: 3+ pitting edema bilaterally   Skin:     General: Skin is warm and dry. Neurological:      Mental Status: He is alert and oriented to person, place, and time. Psychiatric:         Behavior: Behavior normal.         Thought Content: Thought content normal.         Judgment: Judgment normal.            ASSESSMENT/PLAN:    Acute heart failure with preserved ejection fraction (HFpEF) ([facility])  -Resting pulse ox 93%, ambulatory pulse ox dropped to 85%. -Patient placed on oxygen support.  -Discussed with patient and family, patient is sent to the ER for further management. Diabetes mellitus type 2 in obese ([facility] [address]  -We will be managing inpatient    CKD stage G3a/A2, GFR 45-59 and albumin creatinine ratio  mg/g ([facility])       RTC:  No follow-ups on file. I have reviewed my findings and recommendations with Erin Rocha and Dr. Yovany Pacheco.     Alvarado Fuchs MD   7/5/2022 2:00 PM

## 2022-07-06 DIAGNOSIS — I48.19 PERSISTENT ATRIAL FIBRILLATION (HCC): ICD-10-CM

## 2022-07-06 RX ORDER — APIXABAN 5 MG/1
TABLET, FILM COATED ORAL
Qty: 180 TABLET | Refills: 0 | OUTPATIENT
Start: 2022-07-06

## 2022-07-07 ENCOUNTER — APPOINTMENT (OUTPATIENT)
Dept: GENERAL RADIOLOGY | Age: 67
DRG: 291 | End: 2022-07-07
Payer: MEDICARE

## 2022-07-07 ENCOUNTER — HOSPITAL ENCOUNTER (INPATIENT)
Age: 67
LOS: 6 days | Discharge: HOME | DRG: 291 | End: 2022-07-13
Attending: EMERGENCY MEDICINE | Admitting: INTERNAL MEDICINE
Payer: MEDICARE

## 2022-07-07 ENCOUNTER — OFFICE VISIT (OUTPATIENT)
Dept: INTERNAL MEDICINE | Age: 67
DRG: 291 | End: 2022-07-07
Payer: MEDICARE

## 2022-07-07 VITALS
RESPIRATION RATE: 18 BRPM | WEIGHT: 227 LBS | HEART RATE: 87 BPM | SYSTOLIC BLOOD PRESSURE: 119 MMHG | TEMPERATURE: 97.8 F | OXYGEN SATURATION: 93 % | HEIGHT: 67 IN | BODY MASS INDEX: 35.63 KG/M2 | DIASTOLIC BLOOD PRESSURE: 67 MMHG

## 2022-07-07 DIAGNOSIS — E66.9 DIABETES MELLITUS TYPE 2 IN OBESE (HCC): ICD-10-CM

## 2022-07-07 DIAGNOSIS — R53.83 OTHER FATIGUE: ICD-10-CM

## 2022-07-07 DIAGNOSIS — I10 ESSENTIAL HYPERTENSION: ICD-10-CM

## 2022-07-07 DIAGNOSIS — E11.65 TYPE 2 DIABETES MELLITUS WITH HYPERGLYCEMIA, WITH LONG-TERM CURRENT USE OF INSULIN (HCC): ICD-10-CM

## 2022-07-07 DIAGNOSIS — N18.31 CKD STAGE G3A/A2, GFR 45-59 AND ALBUMIN CREATININE RATIO 30-299 MG/G (HCC): ICD-10-CM

## 2022-07-07 DIAGNOSIS — I50.31 ACUTE HEART FAILURE WITH PRESERVED EJECTION FRACTION (HFPEF) (HCC): Primary | ICD-10-CM

## 2022-07-07 DIAGNOSIS — I50.9 ACUTE ON CHRONIC CONGESTIVE HEART FAILURE, UNSPECIFIED HEART FAILURE TYPE (HCC): Primary | ICD-10-CM

## 2022-07-07 DIAGNOSIS — E11.43 TYPE 2 DIABETES MELLITUS WITH DIABETIC AUTONOMIC NEUROPATHY, WITH LONG-TERM CURRENT USE OF INSULIN (HCC): ICD-10-CM

## 2022-07-07 DIAGNOSIS — J96.01 ACUTE RESPIRATORY FAILURE WITH HYPOXIA (HCC): ICD-10-CM

## 2022-07-07 DIAGNOSIS — Z79.4 TYPE 2 DIABETES MELLITUS WITH HYPERGLYCEMIA, WITH LONG-TERM CURRENT USE OF INSULIN (HCC): ICD-10-CM

## 2022-07-07 DIAGNOSIS — E11.69 DIABETES MELLITUS TYPE 2 IN OBESE (HCC): ICD-10-CM

## 2022-07-07 DIAGNOSIS — Z79.4 TYPE 2 DIABETES MELLITUS WITH DIABETIC AUTONOMIC NEUROPATHY, WITH LONG-TERM CURRENT USE OF INSULIN (HCC): ICD-10-CM

## 2022-07-07 PROBLEM — N17.9 ACUTE KIDNEY INJURY (HCC): Status: RESOLVED | Noted: 2022-03-11 | Resolved: 2022-07-07

## 2022-07-07 PROBLEM — N17.9 AKI (ACUTE KIDNEY INJURY) (HCC): Status: RESOLVED | Noted: 2022-03-10 | Resolved: 2022-07-07

## 2022-07-07 LAB
ALBUMIN SERPL-MCNC: 4 G/DL (ref 3.5–5.2)
ALP BLD-CCNC: 129 U/L (ref 40–129)
ALT SERPL-CCNC: 28 U/L (ref 0–40)
ANION GAP SERPL CALCULATED.3IONS-SCNC: 15 MMOL/L (ref 7–16)
AST SERPL-CCNC: 39 U/L (ref 0–39)
BASOPHILS ABSOLUTE: 0.08 E9/L (ref 0–0.2)
BASOPHILS RELATIVE PERCENT: 1 % (ref 0–2)
BILIRUB SERPL-MCNC: 1 MG/DL (ref 0–1.2)
BUN BLDV-MCNC: 53 MG/DL (ref 6–23)
CALCIUM SERPL-MCNC: 10.1 MG/DL (ref 8.6–10.2)
CHLORIDE BLD-SCNC: 89 MMOL/L (ref 98–107)
CO2: 30 MMOL/L (ref 22–29)
CREAT SERPL-MCNC: 1.5 MG/DL (ref 0.7–1.2)
EOSINOPHILS ABSOLUTE: 0.33 E9/L (ref 0.05–0.5)
EOSINOPHILS RELATIVE PERCENT: 4 % (ref 0–6)
GFR AFRICAN AMERICAN: 56
GFR NON-AFRICAN AMERICAN: 46 ML/MIN/1.73
GLUCOSE BLD-MCNC: 463 MG/DL (ref 74–99)
HCT VFR BLD CALC: 35.1 % (ref 37–54)
HEMOGLOBIN: 11.9 G/DL (ref 12.5–16.5)
IMMATURE GRANULOCYTES #: 0.17 E9/L
IMMATURE GRANULOCYTES %: 2 % (ref 0–5)
LYMPHOCYTES ABSOLUTE: 1.3 E9/L (ref 1.5–4)
LYMPHOCYTES RELATIVE PERCENT: 15.6 % (ref 20–42)
MCH RBC QN AUTO: 30.5 PG (ref 26–35)
MCHC RBC AUTO-ENTMCNC: 33.9 % (ref 32–34.5)
MCV RBC AUTO: 90 FL (ref 80–99.9)
METER GLUCOSE: 384 MG/DL (ref 74–99)
METER GLUCOSE: 403 MG/DL (ref 74–99)
MONOCYTES ABSOLUTE: 0.64 E9/L (ref 0.1–0.95)
MONOCYTES RELATIVE PERCENT: 7.7 % (ref 2–12)
NEUTROPHILS ABSOLUTE: 5.83 E9/L (ref 1.8–7.3)
NEUTROPHILS RELATIVE PERCENT: 69.7 % (ref 43–80)
PDW BLD-RTO: 16.8 FL (ref 11.5–15)
PLATELET # BLD: 241 E9/L (ref 130–450)
PMV BLD AUTO: 10.3 FL (ref 7–12)
POTASSIUM SERPL-SCNC: 4.7 MMOL/L (ref 3.5–5)
PRO-BNP: 1512 PG/ML (ref 0–125)
RBC # BLD: 3.9 E12/L (ref 3.8–5.8)
REASON FOR REJECTION: NORMAL
REASON FOR REJECTION: NORMAL
REJECTED TEST: NORMAL
REJECTED TEST: NORMAL
SODIUM BLD-SCNC: 134 MMOL/L (ref 132–146)
TOTAL PROTEIN: 8.7 G/DL (ref 6.4–8.3)
TROPONIN, HIGH SENSITIVITY: 38 NG/L (ref 0–11)
TROPONIN, HIGH SENSITIVITY: 40 NG/L (ref 0–11)
WBC # BLD: 8.4 E9/L (ref 4.5–11.5)

## 2022-07-07 PROCEDURE — 99285 EMERGENCY DEPT VISIT HI MDM: CPT

## 2022-07-07 PROCEDURE — 82962 GLUCOSE BLOOD TEST: CPT

## 2022-07-07 PROCEDURE — 1123F ACP DISCUSS/DSCN MKR DOCD: CPT | Performed by: INTERNAL MEDICINE

## 2022-07-07 PROCEDURE — 6370000000 HC RX 637 (ALT 250 FOR IP): Performed by: INTERNAL MEDICINE

## 2022-07-07 PROCEDURE — 99212 OFFICE O/P EST SF 10 MIN: CPT | Performed by: INTERNAL MEDICINE

## 2022-07-07 PROCEDURE — 2500000003 HC RX 250 WO HCPCS: Performed by: STUDENT IN AN ORGANIZED HEALTH CARE EDUCATION/TRAINING PROGRAM

## 2022-07-07 PROCEDURE — 3046F HEMOGLOBIN A1C LEVEL >9.0%: CPT | Performed by: INTERNAL MEDICINE

## 2022-07-07 PROCEDURE — 6360000002 HC RX W HCPCS

## 2022-07-07 PROCEDURE — 2580000003 HC RX 258

## 2022-07-07 PROCEDURE — S5553 INSULIN LONG ACTING 5 U: HCPCS | Performed by: INTERNAL MEDICINE

## 2022-07-07 PROCEDURE — 83880 ASSAY OF NATRIURETIC PEPTIDE: CPT

## 2022-07-07 PROCEDURE — 94664 DEMO&/EVAL PT USE INHALER: CPT

## 2022-07-07 PROCEDURE — 84484 ASSAY OF TROPONIN QUANT: CPT

## 2022-07-07 PROCEDURE — 71046 X-RAY EXAM CHEST 2 VIEWS: CPT

## 2022-07-07 PROCEDURE — 2060000000 HC ICU INTERMEDIATE R&B

## 2022-07-07 PROCEDURE — 93005 ELECTROCARDIOGRAM TRACING: CPT | Performed by: PHYSICIAN ASSISTANT

## 2022-07-07 PROCEDURE — 85025 COMPLETE CBC W/AUTO DIFF WBC: CPT

## 2022-07-07 PROCEDURE — 36415 COLL VENOUS BLD VENIPUNCTURE: CPT

## 2022-07-07 PROCEDURE — 96374 THER/PROPH/DIAG INJ IV PUSH: CPT

## 2022-07-07 PROCEDURE — 94640 AIRWAY INHALATION TREATMENT: CPT

## 2022-07-07 PROCEDURE — 6370000000 HC RX 637 (ALT 250 FOR IP)

## 2022-07-07 PROCEDURE — 80053 COMPREHEN METABOLIC PANEL: CPT

## 2022-07-07 PROCEDURE — 99215 OFFICE O/P EST HI 40 MIN: CPT | Performed by: INTERNAL MEDICINE

## 2022-07-07 PROCEDURE — 2500000003 HC RX 250 WO HCPCS

## 2022-07-07 RX ORDER — INSULIN LISPRO 100 [IU]/ML
0-6 INJECTION, SOLUTION INTRAVENOUS; SUBCUTANEOUS EVERY 4 HOURS
Status: DISCONTINUED | OUTPATIENT
Start: 2022-07-07 | End: 2022-07-08

## 2022-07-07 RX ORDER — INSULIN LISPRO 100 [IU]/ML
0-6 INJECTION, SOLUTION INTRAVENOUS; SUBCUTANEOUS
Status: CANCELLED | OUTPATIENT
Start: 2022-07-07

## 2022-07-07 RX ORDER — INSULIN LISPRO 100 [IU]/ML
0-6 INJECTION, SOLUTION INTRAVENOUS; SUBCUTANEOUS
Status: DISCONTINUED | OUTPATIENT
Start: 2022-07-07 | End: 2022-07-07

## 2022-07-07 RX ORDER — INSULIN LISPRO 100 [IU]/ML
0-3 INJECTION, SOLUTION INTRAVENOUS; SUBCUTANEOUS NIGHTLY
Status: CANCELLED | OUTPATIENT
Start: 2022-07-07

## 2022-07-07 RX ORDER — BUMETANIDE 0.25 MG/ML
1 INJECTION, SOLUTION INTRAMUSCULAR; INTRAVENOUS 2 TIMES DAILY
Status: DISCONTINUED | OUTPATIENT
Start: 2022-07-07 | End: 2022-07-08

## 2022-07-07 RX ORDER — ARFORMOTEROL TARTRATE 15 UG/2ML
15 SOLUTION RESPIRATORY (INHALATION) 2 TIMES DAILY
Status: DISCONTINUED | OUTPATIENT
Start: 2022-07-07 | End: 2022-07-13 | Stop reason: HOSPADM

## 2022-07-07 RX ORDER — ALBUTEROL SULFATE 2.5 MG/3ML
2.5 SOLUTION RESPIRATORY (INHALATION) EVERY 6 HOURS PRN
Status: DISCONTINUED | OUTPATIENT
Start: 2022-07-07 | End: 2022-07-13 | Stop reason: HOSPADM

## 2022-07-07 RX ORDER — ACETAMINOPHEN 650 MG/1
650 SUPPOSITORY RECTAL EVERY 6 HOURS PRN
Status: DISCONTINUED | OUTPATIENT
Start: 2022-07-07 | End: 2022-07-13 | Stop reason: HOSPADM

## 2022-07-07 RX ORDER — BUDESONIDE 0.5 MG/2ML
0.5 INHALANT ORAL 2 TIMES DAILY
Status: DISCONTINUED | OUTPATIENT
Start: 2022-07-07 | End: 2022-07-13 | Stop reason: HOSPADM

## 2022-07-07 RX ORDER — SODIUM CHLORIDE 9 MG/ML
INJECTION, SOLUTION INTRAVENOUS PRN
Status: DISCONTINUED | OUTPATIENT
Start: 2022-07-07 | End: 2022-07-13 | Stop reason: HOSPADM

## 2022-07-07 RX ORDER — ATORVASTATIN CALCIUM 40 MG/1
40 TABLET, FILM COATED ORAL NIGHTLY
Status: DISCONTINUED | OUTPATIENT
Start: 2022-07-07 | End: 2022-07-13 | Stop reason: HOSPADM

## 2022-07-07 RX ORDER — SODIUM CHLORIDE 0.9 % (FLUSH) 0.9 %
5-40 SYRINGE (ML) INJECTION PRN
Status: DISCONTINUED | OUTPATIENT
Start: 2022-07-07 | End: 2022-07-13 | Stop reason: HOSPADM

## 2022-07-07 RX ORDER — DEXTROSE MONOHYDRATE 25 G/50ML
25 INJECTION, SOLUTION INTRAVENOUS PRN
Status: DISCONTINUED | OUTPATIENT
Start: 2022-07-07 | End: 2022-07-13 | Stop reason: HOSPADM

## 2022-07-07 RX ORDER — SODIUM CHLORIDE 0.9 % (FLUSH) 0.9 %
5-40 SYRINGE (ML) INJECTION EVERY 12 HOURS SCHEDULED
Status: DISCONTINUED | OUTPATIENT
Start: 2022-07-07 | End: 2022-07-13 | Stop reason: HOSPADM

## 2022-07-07 RX ORDER — INSULIN LISPRO 100 [IU]/ML
10 INJECTION, SOLUTION INTRAVENOUS; SUBCUTANEOUS
Status: DISCONTINUED | OUTPATIENT
Start: 2022-07-07 | End: 2022-07-09

## 2022-07-07 RX ORDER — ASPIRIN 81 MG/1
81 TABLET ORAL DAILY
Status: DISCONTINUED | OUTPATIENT
Start: 2022-07-07 | End: 2022-07-13 | Stop reason: HOSPADM

## 2022-07-07 RX ORDER — BUMETANIDE 0.25 MG/ML
1 INJECTION, SOLUTION INTRAMUSCULAR; INTRAVENOUS ONCE
Status: COMPLETED | OUTPATIENT
Start: 2022-07-07 | End: 2022-07-07

## 2022-07-07 RX ORDER — POLYETHYLENE GLYCOL 3350 17 G/17G
17 POWDER, FOR SOLUTION ORAL DAILY PRN
Status: DISCONTINUED | OUTPATIENT
Start: 2022-07-07 | End: 2022-07-13 | Stop reason: HOSPADM

## 2022-07-07 RX ORDER — ACETAMINOPHEN 325 MG/1
650 TABLET ORAL EVERY 6 HOURS PRN
Status: DISCONTINUED | OUTPATIENT
Start: 2022-07-07 | End: 2022-07-13 | Stop reason: HOSPADM

## 2022-07-07 RX ORDER — METOLAZONE 2.5 MG/1
2.5 TABLET ORAL
Status: DISCONTINUED | OUTPATIENT
Start: 2022-07-08 | End: 2022-07-08

## 2022-07-07 RX ORDER — INSULIN GLARGINE-YFGN 100 [IU]/ML
45 INJECTION, SOLUTION SUBCUTANEOUS NIGHTLY
Status: DISCONTINUED | OUTPATIENT
Start: 2022-07-07 | End: 2022-07-09

## 2022-07-07 RX ORDER — ONDANSETRON 2 MG/ML
4 INJECTION INTRAMUSCULAR; INTRAVENOUS EVERY 6 HOURS PRN
Status: DISCONTINUED | OUTPATIENT
Start: 2022-07-07 | End: 2022-07-07

## 2022-07-07 RX ORDER — SPIRONOLACTONE 25 MG/1
25 TABLET ORAL
Status: DISCONTINUED | OUTPATIENT
Start: 2022-07-08 | End: 2022-07-08

## 2022-07-07 RX ORDER — DEXTROSE MONOHYDRATE 25 G/50ML
50 INJECTION, SOLUTION INTRAVENOUS PRN
Status: DISCONTINUED | OUTPATIENT
Start: 2022-07-07 | End: 2022-07-13 | Stop reason: HOSPADM

## 2022-07-07 RX ORDER — LEVOTHYROXINE SODIUM 88 UG/1
88 TABLET ORAL DAILY
Status: DISCONTINUED | OUTPATIENT
Start: 2022-07-07 | End: 2022-07-13 | Stop reason: HOSPADM

## 2022-07-07 RX ORDER — VITAMIN B COMPLEX
1000 TABLET ORAL DAILY
Status: DISCONTINUED | OUTPATIENT
Start: 2022-07-07 | End: 2022-07-13 | Stop reason: HOSPADM

## 2022-07-07 RX ORDER — ONDANSETRON 4 MG/1
4 TABLET, ORALLY DISINTEGRATING ORAL EVERY 8 HOURS PRN
Status: DISCONTINUED | OUTPATIENT
Start: 2022-07-07 | End: 2022-07-07

## 2022-07-07 RX ORDER — INSULIN GLARGINE-YFGN 100 [IU]/ML
60 INJECTION, SOLUTION SUBCUTANEOUS NIGHTLY
Status: DISCONTINUED | OUTPATIENT
Start: 2022-07-07 | End: 2022-07-07

## 2022-07-07 RX ORDER — METOPROLOL SUCCINATE 25 MG/1
25 TABLET, EXTENDED RELEASE ORAL DAILY
Status: DISCONTINUED | OUTPATIENT
Start: 2022-07-07 | End: 2022-07-10

## 2022-07-07 RX ORDER — DEXTROSE MONOHYDRATE 50 MG/ML
100 INJECTION, SOLUTION INTRAVENOUS PRN
Status: DISCONTINUED | OUTPATIENT
Start: 2022-07-07 | End: 2022-07-13 | Stop reason: HOSPADM

## 2022-07-07 RX ORDER — INSULIN LISPRO 100 [IU]/ML
0-3 INJECTION, SOLUTION INTRAVENOUS; SUBCUTANEOUS NIGHTLY
Status: DISCONTINUED | OUTPATIENT
Start: 2022-07-07 | End: 2022-07-07

## 2022-07-07 RX ADMIN — ACETAMINOPHEN 325MG 650 MG: 325 TABLET ORAL at 21:22

## 2022-07-07 RX ADMIN — APIXABAN 5 MG: 5 TABLET, FILM COATED ORAL at 21:22

## 2022-07-07 RX ADMIN — BUMETANIDE 1 MG: 0.25 INJECTION, SOLUTION INTRAMUSCULAR; INTRAVENOUS at 11:57

## 2022-07-07 RX ADMIN — INSULIN LISPRO 10 UNITS: 100 INJECTION, SOLUTION INTRAVENOUS; SUBCUTANEOUS at 17:51

## 2022-07-07 RX ADMIN — ARFORMOTEROL TARTRATE 15 MCG: 15 SOLUTION RESPIRATORY (INHALATION) at 19:27

## 2022-07-07 RX ADMIN — INSULIN LISPRO 4 UNITS: 100 INJECTION, SOLUTION INTRAVENOUS; SUBCUTANEOUS at 17:52

## 2022-07-07 RX ADMIN — ATORVASTATIN CALCIUM 40 MG: 40 TABLET, FILM COATED ORAL at 21:22

## 2022-07-07 RX ADMIN — BUMETANIDE 1 MG: 0.25 INJECTION, SOLUTION INTRAMUSCULAR; INTRAVENOUS at 21:24

## 2022-07-07 RX ADMIN — SODIUM CHLORIDE, PRESERVATIVE FREE 10 ML: 5 INJECTION INTRAVENOUS at 21:24

## 2022-07-07 RX ADMIN — INSULIN GLARGINE-YFGN 45 UNITS: 100 INJECTION, SOLUTION SUBCUTANEOUS at 21:22

## 2022-07-07 RX ADMIN — BUDESONIDE 500 MCG: 0.5 SUSPENSION RESPIRATORY (INHALATION) at 19:27

## 2022-07-07 RX ADMIN — INSULIN LISPRO 6 UNITS: 100 INJECTION, SOLUTION INTRAVENOUS; SUBCUTANEOUS at 21:22

## 2022-07-07 ASSESSMENT — ENCOUNTER SYMPTOMS
SINUS PAIN: 0
COLOR CHANGE: 0
COUGH: 0
CHEST TIGHTNESS: 0
ABDOMINAL PAIN: 0
NAUSEA: 0
BACK PAIN: 0
SHORTNESS OF BREATH: 1
RHINORRHEA: 0
SORE THROAT: 0
DIARRHEA: 0
CONSTIPATION: 0
BACK PAIN: 0
SINUS PRESSURE: 0
DIARRHEA: 0
VOMITING: 0
EYE REDNESS: 0
ABDOMINAL PAIN: 0
VOMITING: 0
COUGH: 0
EYE PAIN: 0
SHORTNESS OF BREATH: 0
CONSTIPATION: 0

## 2022-07-07 ASSESSMENT — PAIN - FUNCTIONAL ASSESSMENT: PAIN_FUNCTIONAL_ASSESSMENT: NONE - DENIES PAIN

## 2022-07-07 NOTE — H&P
[facility]  Internal Medicine Residency Program  History and Physical    Patient:  Jesus Reddy 69 y.o. male MRN: [id number]     Date of Service: 7/7/2022    Hospital Day: 1      Chief complaint: had concerns including Shortness of Breath (hx CHF, sent from clinic because of fluid overload) and Leg Swelling. History of Present Illness     The patient is a 69 y.o. male  who was sent from clinic to the emergency department due to concerns of shortness of breath and hypoxia. The SOB had been progressing for the past three weeks. It is exacerbated by walking and relieved with rest. The SOB is associated with an intermittent dry cough. He denies any sputum or blood. His wife also noted lower limb swelling that had been worsening for the past three weeks. She also noted that he feels tired more than usual and sleeps more often. He sleeps in a recliner because of his lower back pain, but is unsure if he would feel SOB lying flat. He sleeps with a nasal canula on at 4L/minute. His wife notes that he sometimes does not adhere to his medications. He denies any chest pain, palpitations, PND, diaphoresis, abdominal pain, nausea, vomiting, constipation or diarrhea. Two weeks prior, he was seen by the clinic and was told he needs to be admitted to the hospital but he refused. Yesterday, he was seen in the nephrology clinic and was also told to go to the ED but he also refused. Today, he was seen in the clinic for a high blood sugar follow up but there were concerns for SOB and hypoxia (85% with ambulation) and he was sent to the ED. In the ED, he arrived hemodynamically stable saturating well on room air. Labs were remarkable for sCr 1.5 (baseline 1.1-1.3), BUN 53, , proBNP 1,512, troponin 40 ->38. CXR showed pulmonary vascular congestion. He was giving Bumex 1mg IV in the ED. Patient was admitted for further evaluation and management.     Past Medical History:      Diagnosis Date    RADHA (acute kidney injury) ([facility] 3/10/2022    Atrial fibrillation ([facility] [facility]     Carpal tunnel syndrome     Encounter regarding vascular access for dialysis for ESRD ([facility] [facility] 3/12/2022    Hyperlipidemia     Hypertension     Hypothyroidism     Lung nodule     Nocturnal hypoxemia due to obesity 8/8/2013    Obesity     NIKO (obstructive sleep apnea) 8/8/2013    [facility]    Osteoarthritis     Pinched nerve     Lumbar    Restrictive lung disease secondary to obesity 7/25/2016    Sinus node dysfunction (HCC)     Type II or unspecified type diabetes mellitus without mention of complication, not stated as uncontrolled        Past Surgical History:        Procedure Laterality Date    BACK SURGERY  2012    [facility] Op[facility] Staff nerve in back    BACK SURGERY  05/30/2017    COLONOSCOPY  2007    multiple colon polyps    COLONOSCOPY  06/04/2012    COLONOSCOPY    COLONOSCOPY  04/26/2022    polyp; diverticula--sarah    COLONOSCOPY N/A 4/26/2022    COLONOSCOPY POLYPECTOMY HOT BIOPSY (Snare) performed by Mini Nolasco MD at [address] [facility] [address]      lennie cataracts implant    HERNIA REPAIR      umbilical    PACEMAKER PLACEMENT  10/03/2017    Medtronic dual chamber    Dr. [facility]        Medications Prior to Admission:    Prior to Admission medications    Medication Sig Start Date End Date Taking?  Authorizing Provider   empagliflozin (JARDIANCE) 25 MG tablet Take 1 tablet by mouth daily 6/23/22 6/23/23  Anand Sewell DO   insulin lispro, 1 Unit Dial, (HUMALOG [facility]) 100 UNIT/ML SOPN Inject 10 Units into the skin 3 times daily (before meals) 6/23/22 9/21/22  Jonas Amin MD   insulin glargine [facility]) 100 UNIT/ML injection pen Inject 60 Units into the skin every morning PAP Medication 6/23/22   Jonas Amin MD   metFORMIN (GLUCOPHAGE) 500 MG tablet Take 1 tablet by mouth 2 times daily (with meals) 5/26/22   Romie Santizo MD   Potassium 99 MG TABS Take 1 tablet by mouth daily    Historical Provider, MD   Ferrous Gluconate-C-Folic Acid (IRON-C PO) Take by mouth    Historical Provider, MD   levothyroxine (SYNTHROID) 88 MCG tablet Take 1 tablet by mouth Daily 2/28/22 8/27/22  Jacqueline Jenkins MD   mometasone-formoterol [facility]) 200-5 MCG/ACT inhaler Inhale 2 puffs into the lungs 2 times daily Rinse mouth after using. PAP medication. 2/28/22   Jacqueline Jenkins MD   aspirin (ASPIR-LOW) 81 MG EC tablet TAKE ONE TABLET BY MOUTH EVERY DAY 2/28/22   Jacqueline Jenkins MD   Insulin Pen Needle (PEN NEEDLES) 32G X 6 MM MISC Take insulin daily 2/28/22   Jacqueline Jenkins MD   Lancets MISC Give Delica lancets. Tests twice a day A.M. And P.M 2/28/22   Jacqueline Jenkins MD   blood glucose test strips [facility]) strip TEST IN THE MORNING AND IN THE EVENING 2/28/22   Jacqueline Jenkins MD   ELIQUIS 5 MG TABS tablet Take 1 tablet by mouth 2 times daily 11/22/21   Jacqueline Jenkins MD   atorvastatin (LIPITOR) 40 MG tablet TAKE ONE TABLET BY MOUTH DAILY 11/22/21   Jacqueline Jenkins MD   metoprolol succinate (TOPROL XL) 25 MG extended release tablet Take 1 tablet by mouth daily 11/22/21   Jacqueline Jenkins MD   Misc. Devices MISC Please add portability to current O2 order. Resp to evaluate patient for OCD device. 9/15/21   Domi Urena MD   Misc. Devices KIT Dispense 1 blood pressure cuff. 2/23/21   Buffy Wallace MD   OXYGEN Inhale 2.5 L into the lungs nightly  Patient taking differently: Inhale 2.5 L into the lungs nightly Spouse states uses 4 L/min via nc bedtime 7/28/20   Sharad Velasquez MD   Cholecalciferol (VITAMIN D3) 1000 units TABS TAKE TWO TABLETS BY MOUTH EVERY DAY 7/19/19   Sharad Velasquez MD   zoster recombinant adjuvanted vaccine [facility]) 50 MCG/0.5ML SUSR injection 1 dose now and repeat in 2-6 months 7/19/19   Sharad Velasquez MD   acetaminophen (TYLENOL) 500 MG tablet Take 1,000 mg by mouth daily as needed for Pain.     Historical Provider, MD cervical lymphadenopathy. · Pulmonary/Chest: Rales present in L and R lower lungs,  Decreased breath sounds bilaterally. · Cardiovascular: normal rate, regular rhythm, normal S1 and S2, no murmurs, no gallops, intact distal pulses, no carotid bruits and no JVD  · Abdomen: soft, non-tender, bowel sounds normal and distention present. · Extremities: no cyanosis, no clubbing + edema-  bilateral +2   Labs and Imaging Studies   Basic Labs  Recent Labs     07/07/22  1127      K 4.7   CL 89*   CO2 30*   BUN 53*   CREATININE 1.5*   GLUCOSE 463*   CALCIUM 10.1       Recent Labs     07/07/22  1127   WBC 8.4   RBC 3.90   HGB 11.9*   HCT 35.1*   MCV 90.0   MCH 30.5   MCHC 33.9   RDW 16.8*      MPV 10.3         Imaging Studies:  XR CHEST (2 VW)   Final Result   Cardiomegaly and pulmonary vascular congestion/early CHF. EKG: Prolonged QTc interval. Ventricular paced rhythm. Resident's Assessment and Plan     Assessment and Plan:    1. SOB and Limb edema likely 2/2 acute heart failure exacerbation  - Hx of HFpEF  - SOB worsened with walking, improved with rest  - Associated with nonpurulent dry cough  - Wife noticed increasing lower limb swelling   - Hx of noncompliance with medication  Plan:  A. Bumetanide 1 mg IV BID  B. Metolazone 2.5 mg PO TID  C. Spironolactone 25 mg PO TID  D. Nasal    2. Hyperglycemia 2/2 non adherence of diabetic medication.  - Hx of Type 2 DM  - Hx of noncompliance  - Blood glucose on presentation to the   Plan:  A. Glargine 45 units nightly  B. Lispro 10 units QAC  C. Low dose insulin sliding scale  D. Hypoglycemia protocol    3. Elevated creatinine likely 2/2 acute heart failure exacerbation  - Creatinine levels ED at 1.5  Plan:  A. Urine studies  B. Trend BNP    4. Hypoxia likely 2/2 acute exacerbation of heart failure - improving  - Hypoxia 85% measured in clinic  - SpO2 in ED at 95%  Plan:  A. Nasal canula at 4L/min at [facility]  B. Monitor SpO2    5.  Hx of HLD  Plan:  - Continue Atorvastatin 40 mg daily    6. Hx of Obesity Hypoventilation Syndrome  Plan:  - Nasal canula at 4L/min at night  - Continue home meds    7. Hx of Chronic lower back pain    8. Hx of persistent atrial fibrillation  Plan:   A. Continue Apixaban 5 mg     9. Hx of HTN  Plan:  A. Metoprolol Succinate 25 mg PO daily    10. Hx of hypothyroidism  Plan:  A. Continue Levothyroxine 88 mcg PO daily    11. Hx of Sinus Node Dysfunction -  Dual chamber pacemaker placed in 2017    12. Vitamin D deficiency  Plan:  Continue Vit D    PT/OT evaluation: -/-  DVT prophylaxis/ GI prophylaxis: Eliquis 5 mg PO BID/ Regular diet   Disposition: admit to house team [address] [place] [address] Bebeto Cannon MD, PGY-1  Attending physician: Dr. Brayan Concepcion    Senior Resident Addendum:  I have seen and examined the patient with the intern. I have discussed the case, including pertinent history and exam findings with the intern. I agree with the assessment, plan and orders as documented above with the following additions:    Patient is a 61-year-old male with past medical history of HFpEF (EF 60-65% on 9/2021), HTN, HLD, hypothyroidism, paroxysmal Afib, sinus node dysfunction s/p pacemaker, obesity, NIKO/OHS, CKD Stage IIIb and Vit D deficiency who presents in the ED today for worsening shortness of breath and bilateral lower extremity swelling. Seen yesterday by nephrologist, was started on aldactone 25mg 3x weekly. He was advised that time to go to ED with concerns of worsening edema, fatigue and findings concerning for volume overload but patient refused. He presents today for his appointment in the IM clinic for BG check. He was noted to be hypoxic an ambulation, saturating at 88%. Noted to have significant pitting edema, abdominal distention, crackles and bilateral lower extremity edema on exam. Patient admits to missing doses of his medications when wife is not around.     1. Acute exacerbation of HFpEF (EF 55-60% in 2021)  2. Prolonged QTc, QTc 515 on admission  3. Elevated troponin 2/2 CKD, chronically elevated, usually at the 40s  4. Hyperglycemia in the setting of uncontrolled IDDM,  on admission  5. RADHA Stage I on CKD Stage 3 (baseline 1.1-1.3) - sCr 1.5 on admission  6. Hx of sinus node dysfunction s/p pacemaker placement   7. Hx of IDDM - on 60u basaglar and 10u premeals and SS at home  8. Hx of paroxysmal Afib, on metoprolol and eliquis  9. Hx of HLD, on metoprolol  10. Hx of NIKO/OHS, on 4lpm at night; not using CPAP  11. Hx of hypothyroidism.  On synthroid; TSH in 3/22: 2.950    Plan:  · Start bumex 1mg IV BID  · Start metolazone 2.5mg MWF and aldactone 2.5mg MWF  · Strict I/O  · 45u lantus, 10u premeals and LDSS  · Monitor BG q4h  · Hypoglycemia protocol in place  · Urine studies  · Continue metoprolol and eliquis  · Repeat TSH  · Continue synthroid 88mcg daily    Nilsa Vela MD PGY-2  7/7/2022  11:12 PM

## 2022-07-07 NOTE — PROGRESS NOTES
[facility]  Internal Medicine Residency Clinic    Attending Physician Statement  I have discussed the case, including pertinent history and exam findings with the resident physician. I have seen patient independently and examination complete. I agree with the assessment, plan and orders as documented by the resident. I have reviewed all pertinent PMHx, PSHx, FamHx, SocialHx, medications, and allergies and updated history as appropriate. Presents for 2 week Glucose Follow-up     Recent increase of A1c to 11%- significant increase- there was a concern with medication adherence as regimen was not changed significantly   Spouse present today and discussed with resident missed dosing of medications    Missing insulin dosing periodically    Seen by Nephrology yesterday and advised to go to the ED    Concern with worsening edema, fatigue, and findings concerning for volume overload with possible pleural effusion on exam   Patient was advised yesterday to go to the ER- but refused yesterday   Today- denies overt dyspnea. However wife notes increased fatigue, sleeping more, increased swelling, abdominal distention.    On exam- significant pitting edema, +abdominal distention with obesity, + difficulty lying flat- difficult to determine JVD due to neck circumference, + bilateral rales and dullness to percussion   Oxygen saturation with ambulation dropped to 85%- NC for supplemental oxygen initiated in the room    Acute Heart Failure- hx of diastolic dysfunction on prior ECHO from 2019   No hx of systolic dysfunction   Discussed plan with wife/patient   Plan for transport to ER   CXR, labs including troponin and proBNP, EKG to be initiated   Oxygen supplementation to be continued    Consider IV diuresis    Strict I/O's    Repeat ECHO    Admit to [facility]    Acute Hypoxic Respiratory Failure   Likely secondary to above    Work-up noted and plans     Hyperglycemia in the presence of Type II

## 2022-07-07 NOTE — PROGRESS NOTES
Patient given verbal discharge instructions by Dr Jose A Martinez.  Called transport to take patient to ED at 955am , transport picked patient up at 1015am.

## 2022-07-07 NOTE — ED PROVIDER NOTES
[facility]  Department of Emergency Medicine     Written by: Amaris Lake DO  Patient Name: Mariia Rocha  Attending Provider: David Whalen MD  Admit Date: 2022 11:25 AM  MRN: [id number]                   : 1955        Chief Complaint   Patient presents with    Shortness of Breath     hx CHF, sent from clinic because of fluid overload    Leg Swelling    - Chief complaint    Mr. Rocha is a 69year old male who presents to the ED due shortness of breath and lower extremity edema. Patient has been experiencing increasing lower extremity edema and difficulty breathing over the past 2 weeks. Patient does have a history of congestive heart failure and was sent over to the emergency department after being seen at the CHF clinic earlier today due to his worsening fluid overload. Patient and wife note that he is compliant with his medications but his symptoms have been progressively worsening and constant since onset. States symptoms are aggravated with exertion and lying flat and relieved by nothing. Denies any chest pain. States he has not had any associated fevers, chills, nausea, vomiting, abdominal pain, bowel or urinary changes, headaches or vision changes. Review of Systems   Constitutional: Positive for fatigue. Negative for chills and fever. HENT: Negative for congestion, sinus pressure, sinus pain and sore throat. Eyes: Negative for pain, redness and visual disturbance. Respiratory: Positive for shortness of breath. Negative for cough and chest tightness. Cardiovascular: Positive for leg swelling. Negative for chest pain and palpitations. Gastrointestinal: Negative for abdominal pain, constipation, diarrhea, nausea and vomiting. Genitourinary: Negative for dysuria, flank pain, frequency, hematuria and urgency. Musculoskeletal: Negative for arthralgias, back pain, gait problem, joint swelling and myalgias. Skin: Negative for rash. Neurological: Negative for dizziness, tremors, syncope, weakness, light-headedness, numbness and headaches. Physical Exam  Constitutional:       General: He is not in acute distress. Appearance: Normal appearance. He is normal weight. HENT:      Head: Normocephalic and atraumatic. Right Ear: External ear normal.      Left Ear: External ear normal.      Mouth/Throat:      Mouth: Mucous membranes are moist.      Pharynx: Oropharynx is clear. Eyes:      Extraocular Movements: Extraocular movements intact. Conjunctiva/sclera: Conjunctivae normal.   Cardiovascular:      Rate and Rhythm: Normal rate and regular rhythm. Pulses: Normal pulses. Heart sounds: Normal heart sounds. Pulmonary:      Effort: Pulmonary effort is normal. No tachypnea or respiratory distress. Breath sounds: Examination of the right-lower field reveals rales. Examination of the left-lower field reveals rales. Decreased breath sounds and rales present. Abdominal:      General: Abdomen is flat. Bowel sounds are normal.      Palpations: Abdomen is soft. Tenderness: There is no abdominal tenderness. There is no guarding or rebound. Musculoskeletal:         General: Normal range of motion. Cervical back: Normal range of motion and neck supple. Right lower leg: No tenderness. Edema (2+) present. Left lower leg: No tenderness. Edema (2+) present. Skin:     General: Skin is warm and dry. Findings: No rash. Neurological:      General: No focal deficit present. Mental Status: He is alert and oriented to person, place, and time. Mental status is at baseline. Motor: No weakness. Psychiatric:         Mood and Affect: Mood normal.         Behavior: Behavior normal.          Procedures       MDM  Number of Diagnoses or Management Options  Acute on chronic congestive heart failure, unspecified heart failure type [facility])  Diagnosis management comments:  This is a 69year old male who presents to the ED due shortness of breath and lower extremity edema. Patient was given 1 mg of Bumex via IV in the emergency department. CBC revealed chronic anemia hemoglobin 11.9. CMP revealed creatinine of 1.5 mildly elevated bicarb 30 glucose 463. Initial troponin was 40 with repeat pending. BNP is elevated at 1512. Chest x-ray revealed cardiomegaly and pulmonary vascular congestion/early CHF. Patient will be admitted to hospital for further work-up and treatment by Dr. Marisol Fontanez.                --------------------------------------------- PAST HISTORY ---------------------------------------------  Past Medical History:  has a past medical history of RADHA (acute kidney injury) ([facility] [address] Atrial fibrillation ([facility] [address] Carpal tunnel syndrome, Encounter regarding vascular access for dialysis for ESRD ([facility] [address] Hyperlipidemia, Hypertension, Hypothyroidism, Lung nodule, Nocturnal hypoxemia due to obesity, Obesity, NIKO (obstructive sleep apnea), Osteoarthritis, Pinched nerve, Restrictive lung disease secondary to obesity, Sinus node dysfunction ([facility] [address] and Type II or unspecified type diabetes mellitus without mention of complication, not stated as uncontrolled. Past Surgical History:  has a past surgical history that includes Colonoscopy (2007); back surgery (2012); eye surgery; hernia repair; Colonoscopy (06/04/2012); back surgery (05/30/2017); pacemaker placement (10/03/2017); Rotator cuff repair (Right); Colonoscopy (04/26/2022); and Colonoscopy (N/A, 4/26/2022). Social History:  reports that he quit smoking about 17 years ago. His smoking use included cigarettes. He has a 3.50 pack-year smoking history. He has quit using smokeless tobacco. He reports that he does not drink alcohol and does not use drugs.     Family History: family history includes Amyotrophic lateral sclerosis in his father; Cancer in his brother and mother; Diabetes in his brother; Heart Disease in his mother; High Blood Pressure in his brother and see below    EKG 12 Lead   Result Value Ref Range    Ventricular Rate 76 BPM    Atrial Rate 78 BPM    QRS Duration 162 ms    Q-T Interval 458 ms    QTc Calculation (Bazett) 515 ms    R Axis -77 degrees    T Axis 85 degrees       RADIOLOGY:  XR CHEST (2 VW)   Final Result   Cardiomegaly and pulmonary vascular congestion/early CHF. EKG:  This EKG is signed and interpreted by me. Rate: 76  Rhythm: Ventricular-paced rhythm  Interpretation: non-specific EKG  Comparison: stable as compared to patient's most recent EKG      ------------------------- NURSING NOTES AND VITALS REVIEWED ---------------------------  Date / Time Roomed:  7/7/2022 11:25 AM  ED Bed Assignment:  30/63    The nursing notes within the ED encounter and vital signs as below have been reviewed. Patient Vitals for the past 24 hrs:   BP Temp Temp src Pulse Resp SpO2   07/07/22 1130 126/73 -- -- 84 16 95 %   07/07/22 1059 122/72 -- -- 87 18 91 %   07/07/22 1032 -- 97.8 °F (36.6 °C) Oral -- -- --       Oxygen Saturation Interpretation: Normal    ------------------------------------------ PROGRESS NOTES ------------------------------------------  Re-evaluation(s):  Time: 1300  Patients symptoms show no change  Repeat physical examination is not changed    Counseling:  I have spoken with the patient and discussed todays results, in addition to providing specific details for the plan of care and counseling regarding the diagnosis and prognosis. Their questions are answered at this time and they are agreeable with the plan of admission.    --------------------------------- ADDITIONAL PROVIDER NOTES ---------------------------------  Consultations:  Time: 1320. Spoke with Dr. Anjana Edmondson. Discussed case. They will admit the patient.   This patient's ED course included: a personal history and physicial examination, re-evaluation prior to disposition, multiple bedside re-evaluations, IV medications, cardiac monitoring and continuous pulse

## 2022-07-08 LAB
ANION GAP SERPL CALCULATED.3IONS-SCNC: 18 MMOL/L (ref 7–16)
BASOPHILS ABSOLUTE: 0.09 E9/L (ref 0–0.2)
BASOPHILS RELATIVE PERCENT: 1.3 % (ref 0–2)
BUN BLDV-MCNC: 61 MG/DL (ref 6–23)
CALCIUM SERPL-MCNC: 10 MG/DL (ref 8.6–10.2)
CHLORIDE BLD-SCNC: 89 MMOL/L (ref 98–107)
CO2: 29 MMOL/L (ref 22–29)
CREAT SERPL-MCNC: 1.7 MG/DL (ref 0.7–1.2)
EKG ATRIAL RATE: 78 BPM
EKG Q-T INTERVAL: 458 MS
EKG QRS DURATION: 162 MS
EKG QTC CALCULATION (BAZETT): 515 MS
EKG R AXIS: -77 DEGREES
EKG T AXIS: 85 DEGREES
EKG VENTRICULAR RATE: 76 BPM
EOSINOPHILS ABSOLUTE: 0.37 E9/L (ref 0.05–0.5)
EOSINOPHILS RELATIVE PERCENT: 5.2 % (ref 0–6)
GFR AFRICAN AMERICAN: 49
GFR NON-AFRICAN AMERICAN: 40 ML/MIN/1.73
GLUCOSE BLD-MCNC: 252 MG/DL (ref 74–99)
HCT VFR BLD CALC: 36.8 % (ref 37–54)
HEMOGLOBIN: 12.3 G/DL (ref 12.5–16.5)
IMMATURE GRANULOCYTES #: 0.22 E9/L
IMMATURE GRANULOCYTES %: 3.1 % (ref 0–5)
LYMPHOCYTES ABSOLUTE: 1.54 E9/L (ref 1.5–4)
LYMPHOCYTES RELATIVE PERCENT: 21.6 % (ref 20–42)
MAGNESIUM: 1.7 MG/DL (ref 1.6–2.6)
MCH RBC QN AUTO: 30.1 PG (ref 26–35)
MCHC RBC AUTO-ENTMCNC: 33.4 % (ref 32–34.5)
MCV RBC AUTO: 90 FL (ref 80–99.9)
METER GLUCOSE: 256 MG/DL (ref 74–99)
METER GLUCOSE: 267 MG/DL (ref 74–99)
METER GLUCOSE: 276 MG/DL (ref 74–99)
METER GLUCOSE: 336 MG/DL (ref 74–99)
METER GLUCOSE: 442 MG/DL (ref 74–99)
MONOCYTES ABSOLUTE: 0.7 E9/L (ref 0.1–0.95)
MONOCYTES RELATIVE PERCENT: 9.8 % (ref 2–12)
NEUTROPHILS ABSOLUTE: 4.2 E9/L (ref 1.8–7.3)
NEUTROPHILS RELATIVE PERCENT: 59 % (ref 43–80)
PDW BLD-RTO: 16.6 FL (ref 11.5–15)
PHOSPHORUS: 4.7 MG/DL (ref 2.5–4.5)
PLATELET # BLD: 209 E9/L (ref 130–450)
PMV BLD AUTO: 9.7 FL (ref 7–12)
POTASSIUM SERPL-SCNC: 3.3 MMOL/L (ref 3.5–5)
RBC # BLD: 4.09 E12/L (ref 3.8–5.8)
SODIUM BLD-SCNC: 136 MMOL/L (ref 132–146)
TSH SERPL DL<=0.05 MIU/L-ACNC: 21.63 UIU/ML (ref 0.27–4.2)
WBC # BLD: 7.1 E9/L (ref 4.5–11.5)

## 2022-07-08 PROCEDURE — 84100 ASSAY OF PHOSPHORUS: CPT

## 2022-07-08 PROCEDURE — 2500000003 HC RX 250 WO HCPCS

## 2022-07-08 PROCEDURE — 2580000003 HC RX 258

## 2022-07-08 PROCEDURE — 2060000000 HC ICU INTERMEDIATE R&B

## 2022-07-08 PROCEDURE — 82962 GLUCOSE BLOOD TEST: CPT

## 2022-07-08 PROCEDURE — 80048 BASIC METABOLIC PNL TOTAL CA: CPT

## 2022-07-08 PROCEDURE — 6370000000 HC RX 637 (ALT 250 FOR IP): Performed by: INTERNAL MEDICINE

## 2022-07-08 PROCEDURE — 84443 ASSAY THYROID STIM HORMONE: CPT

## 2022-07-08 PROCEDURE — 99222 1ST HOSP IP/OBS MODERATE 55: CPT | Performed by: INTERNAL MEDICINE

## 2022-07-08 PROCEDURE — S5553 INSULIN LONG ACTING 5 U: HCPCS | Performed by: INTERNAL MEDICINE

## 2022-07-08 PROCEDURE — 94660 CPAP INITIATION&MGMT: CPT

## 2022-07-08 PROCEDURE — 36415 COLL VENOUS BLD VENIPUNCTURE: CPT

## 2022-07-08 PROCEDURE — 85025 COMPLETE CBC W/AUTO DIFF WBC: CPT

## 2022-07-08 PROCEDURE — 94640 AIRWAY INHALATION TREATMENT: CPT

## 2022-07-08 PROCEDURE — 6370000000 HC RX 637 (ALT 250 FOR IP)

## 2022-07-08 PROCEDURE — 6360000002 HC RX W HCPCS

## 2022-07-08 PROCEDURE — 83735 ASSAY OF MAGNESIUM: CPT

## 2022-07-08 RX ORDER — BUMETANIDE 0.25 MG/ML
1 INJECTION, SOLUTION INTRAMUSCULAR; INTRAVENOUS NIGHTLY
Status: DISCONTINUED | OUTPATIENT
Start: 2022-07-08 | End: 2022-07-13 | Stop reason: HOSPADM

## 2022-07-08 RX ORDER — BUMETANIDE 0.25 MG/ML
0.5 INJECTION, SOLUTION INTRAMUSCULAR; INTRAVENOUS ONCE
Status: CANCELLED | OUTPATIENT
Start: 2022-07-08 | End: 2022-07-08

## 2022-07-08 RX ORDER — BUMETANIDE 0.25 MG/ML
1 INJECTION, SOLUTION INTRAMUSCULAR; INTRAVENOUS DAILY
Status: DISCONTINUED | OUTPATIENT
Start: 2022-07-09 | End: 2022-07-08

## 2022-07-08 RX ORDER — BUMETANIDE 1 MG/1
2 TABLET ORAL DAILY
Status: DISCONTINUED | OUTPATIENT
Start: 2022-07-09 | End: 2022-07-13 | Stop reason: HOSPADM

## 2022-07-08 RX ORDER — INSULIN LISPRO 100 [IU]/ML
0-6 INJECTION, SOLUTION INTRAVENOUS; SUBCUTANEOUS
Status: DISCONTINUED | OUTPATIENT
Start: 2022-07-08 | End: 2022-07-09

## 2022-07-08 RX ORDER — INSULIN LISPRO 100 [IU]/ML
0-3 INJECTION, SOLUTION INTRAVENOUS; SUBCUTANEOUS NIGHTLY
Status: DISCONTINUED | OUTPATIENT
Start: 2022-07-08 | End: 2022-07-09

## 2022-07-08 RX ORDER — POTASSIUM CHLORIDE 20 MEQ/1
40 TABLET, EXTENDED RELEASE ORAL ONCE
Status: COMPLETED | OUTPATIENT
Start: 2022-07-08 | End: 2022-07-08

## 2022-07-08 RX ADMIN — SODIUM CHLORIDE, PRESERVATIVE FREE 10 ML: 5 INJECTION INTRAVENOUS at 09:22

## 2022-07-08 RX ADMIN — INSULIN LISPRO 6 UNITS: 100 INJECTION, SOLUTION INTRAVENOUS; SUBCUTANEOUS at 12:14

## 2022-07-08 RX ADMIN — ACETAMINOPHEN 325MG 650 MG: 325 TABLET ORAL at 23:02

## 2022-07-08 RX ADMIN — SPIRONOLACTONE 25 MG: 25 TABLET ORAL at 09:20

## 2022-07-08 RX ADMIN — SACUBITRIL AND VALSARTAN 1 TABLET: 24; 26 TABLET, FILM COATED ORAL at 21:02

## 2022-07-08 RX ADMIN — BUDESONIDE 500 MCG: 0.5 SUSPENSION RESPIRATORY (INHALATION) at 20:40

## 2022-07-08 RX ADMIN — BUDESONIDE 500 MCG: 0.5 SUSPENSION RESPIRATORY (INHALATION) at 11:27

## 2022-07-08 RX ADMIN — LEVOTHYROXINE SODIUM 88 MCG: 0.09 TABLET ORAL at 09:19

## 2022-07-08 RX ADMIN — METOLAZONE 2.5 MG: 2.5 TABLET ORAL at 09:19

## 2022-07-08 RX ADMIN — Medication 1000 UNITS: at 09:20

## 2022-07-08 RX ADMIN — INSULIN LISPRO 3 UNITS: 100 INJECTION, SOLUTION INTRAVENOUS; SUBCUTANEOUS at 09:25

## 2022-07-08 RX ADMIN — ARFORMOTEROL TARTRATE 15 MCG: 15 SOLUTION RESPIRATORY (INHALATION) at 20:40

## 2022-07-08 RX ADMIN — INSULIN LISPRO 2 UNITS: 100 INJECTION, SOLUTION INTRAVENOUS; SUBCUTANEOUS at 21:01

## 2022-07-08 RX ADMIN — ASPIRIN 81 MG: 81 TABLET, COATED ORAL at 09:20

## 2022-07-08 RX ADMIN — BUMETANIDE 1 MG: 0.25 INJECTION, SOLUTION INTRAMUSCULAR; INTRAVENOUS at 09:20

## 2022-07-08 RX ADMIN — APIXABAN 5 MG: 5 TABLET, FILM COATED ORAL at 21:02

## 2022-07-08 RX ADMIN — ARFORMOTEROL TARTRATE 15 MCG: 15 SOLUTION RESPIRATORY (INHALATION) at 11:27

## 2022-07-08 RX ADMIN — INSULIN LISPRO 10 UNITS: 100 INJECTION, SOLUTION INTRAVENOUS; SUBCUTANEOUS at 17:27

## 2022-07-08 RX ADMIN — POTASSIUM CHLORIDE 40 MEQ: 1500 TABLET, EXTENDED RELEASE ORAL at 09:20

## 2022-07-08 RX ADMIN — INSULIN LISPRO 10 UNITS: 100 INJECTION, SOLUTION INTRAVENOUS; SUBCUTANEOUS at 09:21

## 2022-07-08 RX ADMIN — INSULIN LISPRO 3 UNITS: 100 INJECTION, SOLUTION INTRAVENOUS; SUBCUTANEOUS at 02:07

## 2022-07-08 RX ADMIN — APIXABAN 5 MG: 5 TABLET, FILM COATED ORAL at 09:20

## 2022-07-08 RX ADMIN — INSULIN LISPRO 10 UNITS: 100 INJECTION, SOLUTION INTRAVENOUS; SUBCUTANEOUS at 12:13

## 2022-07-08 RX ADMIN — INSULIN LISPRO 3 UNITS: 100 INJECTION, SOLUTION INTRAVENOUS; SUBCUTANEOUS at 17:28

## 2022-07-08 RX ADMIN — INSULIN LISPRO 3 UNITS: 100 INJECTION, SOLUTION INTRAVENOUS; SUBCUTANEOUS at 06:12

## 2022-07-08 RX ADMIN — INSULIN GLARGINE-YFGN 45 UNITS: 100 INJECTION, SOLUTION SUBCUTANEOUS at 21:01

## 2022-07-08 RX ADMIN — SODIUM CHLORIDE, PRESERVATIVE FREE 10 ML: 5 INJECTION INTRAVENOUS at 21:02

## 2022-07-08 RX ADMIN — ATORVASTATIN CALCIUM 40 MG: 40 TABLET, FILM COATED ORAL at 21:02

## 2022-07-08 RX ADMIN — METOPROLOL SUCCINATE 25 MG: 25 TABLET, FILM COATED, EXTENDED RELEASE ORAL at 09:20

## 2022-07-08 NOTE — PLAN OF CARE
Problem: Discharge Planning  Goal: Discharge to home or other facility with appropriate resources  Outcome: Progressing  Flowsheets  Taken 7/8/2022 0014  Discharge to home or other facility with appropriate resources: Identify barriers to discharge with patient and caregiver  Taken 7/7/2022 1935  Discharge to home or other facility with appropriate resources: Identify barriers to discharge with patient and caregiver     Problem: Safety - Adult  Goal: Free from fall injury  Outcome: Progressing

## 2022-07-08 NOTE — CONSULTS
[address] [facility]  Department of Internal Medicine  Division of Pulmonary, Critical Care and Sleep Medicine  Consult Note      Today's Date: 7/8/2022    Patient Name: Srinivasan Waldrop    Date of admission: 7/7/2022 11:25 AM    Patient's age: 69 y.o., 1955    Admission Dx: Acute on chronic congestive heart failure, unspecified heart failure type ([address] [I50.9]    REASON FOR CONSULT:  NIKO    Requesting Physician: Nii Peterson DO    HISTORY OF PRESENT ILLNESS:   The patient is a 69 y.o. male seen for consultation surrounding their Acute on chronic congestive heart failure, unspecified heart failure type ([address] [I50.9]. We had the pleasure of seeing Srinivasan Waldrop, in the [address] at [facility] [place] regarding his lung nodules, morbid obesity with restrictive lung (TLC 74%) disease and sleep apnea (not tolerated). HISTORY OF PRESENT ILLNESS:    Srinivasan Waldrop is a 69 y.o. male with a past medical history of HTN, HLP, DM, hypothyroid, OA, carpal tunnel S/pa surgery, restrictive lung problems and NIKO intolerant of CPAP therapy. He also has a history of lung nodules that are stable since 2012 with continued surveillance. We reviewed his CT of the chest which demonstrates stable lung nodules since 2012 and we will continue surveillance yearly. We had a long discussion regarding untreated NIKO and not tolerating his CPAP. In 2017, he underwent back surgery which led to prolonged respiratory failure most likely due to upper airway obstruction, volume overload and pain medication. The surgery occurred at [facility] and his wife requested him to be transferred to [facility] where he was able to be extubated and went on to rehabilitation. He is home now but unfortunately the back surgery did not resolve his problems.   He has lost 20 pounds from this event and he continues only to use the oxygen only at night. In 2019, he had a pacer placed by EP for sinus rodolfo dysfunction. Mr. Betty Lanza was seen today for follow up. He has been doing well with minimal shortness of breath with activity. He has no coughing or wheezing. He continues to refuse CPAP therapy but is wearing O2 at 2.5 L nasal/canula at night. He states he is complaint. No chest pain, palpitations or lower extremity edema. No lightheadedness or syncope. He is riding his spider motorcycle. He is using Dulera 200 BID as his insurance will not cover Breo and his lung function was 52 %. We discussed his oxygen concentrators for camping trips. We discussed his weight and BP issues. He is getting his motorcycles fixed and got a new truck He was admitted for urinary issues and feels better. He is seeing a kidney specialist. His neuropathic pain is worsening. Brething is the same.      ALLERGIES:  No Known Allergies    PAST MEDICAL HISTORY:       Diagnosis Date    RADHA (acute kidney injury) ([facility] 3/10/2022    Atrial fibrillation (HCC)     Carpal tunnel syndrome     Encounter regarding vascular access for dialysis for ESRD ([facility] 3/12/2022    Hyperlipidemia     Hypertension     Hypothyroidism     Lung nodule     Nocturnal hypoxemia due to obesity 8/8/2013    Obesity     NIKO (obstructive sleep apnea) 8/8/2013    [facility]    Osteoarthritis     Pinched nerve     Lumbar    Restrictive lung disease secondary to obesity 7/25/2016    Sinus node dysfunction (HCC)     Type II or unspecified type diabetes mellitus without mention of complication, not stated as uncontrolled         MEDICATIONS:   Current Facility-Administered Medications   Medication Dose Route Frequency Provider Last Rate Last Admin    insulin lispro (HUMALOG) injection vial 0-6 Units  0-6 Units SubCUTAneous TID  Nina Fong MD   3 Units at 07/08/22 1728    insulin lispro (HUMALOG) injection vial 0-3 Units  0-3 Units SubCUTAneous Nightly Nina Fong MD  [START ON 7/9/2022] bumetanide (BUMEX) tablet 2 mg  2 mg Oral Daily May Jacobs MD        [Held by provider] bumetanide (BUMEX) injection 1 mg  1 mg IntraVENous Nightly May Jacobs MD        sacubitril-valsartan (ENTRESTO) 24-26 MG per tablet 1 tablet  1 tablet Oral BID Yana Collins MD        aspirin EC tablet 81 mg  81 mg Oral Daily Gonzalez Benavides MD   81 mg at 07/08/22 0920    atorvastatin (LIPITOR) tablet 40 mg  40 mg Oral Nightly Gonzalez Benavides MD   40 mg at 07/07/22 2122    Vitamin D (CHOLECALCIFEROL) tablet 1,000 Units  1,000 Units Oral Daily Gonzalez Benavides MD   1,000 Units at 07/08/22 0920    apixaban (ELIQUIS) tablet 5 mg  5 mg Oral BID Glenn Chase MD   5 mg at 07/08/22 0920    levothyroxine (SYNTHROID) tablet 88 mcg  88 mcg Oral Daily Kenya Kwan MD   88 mcg at 07/08/22 0919    metoprolol succinate (TOPROL XL) extended release tablet 25 mg  25 mg Oral Daily Gonzalez Benavides MD   25 mg at 07/08/22 0920    sodium chloride flush 0.9 % injection 5-40 mL  5-40 mL IntraVENous 2 times per day Gonzalez Benavides MD   10 mL at 07/08/22 0803    sodium chloride flush 0.9 % injection 5-40 mL  5-40 mL IntraVENous PRN Gonzalez Benavides MD        0.9 % sodium chloride infusion   IntraVENous PRN Gonzalez Benavides MD        polyethylene glycol [facility]) packet 17 g  17 g Oral Daily PRN Gonzalez Benavides MD        acetaminophen (TYLENOL) tablet 650 mg  650 mg Oral Q6H PRN Gonzalez Benavides MD   650 mg at 07/07/22 2122    Or    acetaminophen (TYLENOL) suppository 650 mg  650 mg Rectal Q6H PRN Gonzalez Benavides MD        insulin lispro (HUMALOG) injection vial 10 Units  10 Units SubCUTAneous TID HEENA Benavides MD   10 Units at 07/08/22 1727    glucose chewable tablet 16 g  4 tablet Oral PRN Gonzalez Benavides MD        dextrose 50 % IV solution  25 mL IntraVENous PRN Glenn Pate Tomas Pacheco MD        Or    dextrose 50 % IV solution  50 mL IntraVENous PRN Elba Johansen MD        glucagon (rDNA) injection 1 mg  1 mg IntraMUSCular PRN Elba Johansen MD        dextrose 5 % solution  100 mL/hr IntraVENous PRN Elba Johansen MD        insulin glargine-yfgn [facility]) injection vial 45 Units  45 Units SubCUTAneous Nightly Martin Letyhs., DO   45 Units at 07/07/22 2122    budesonide (PULMICORT) nebulizer suspension 500 mcg  0.5 mg Nebulization BID Shirlyn Cure Tomas Pacheco MD   500 mcg at 07/08/22 1127    And    Arformoterol Tartrate (BROVANA) nebulizer solution 15 mcg  15 mcg Nebulization BID Elba Johansen MD   15 mcg at 07/08/22 1127    albuterol (PROVENTIL) nebulizer solution 2.5 mg  2.5 mg Nebulization Q6H PRN Elba Johansen MD        trimethobenzamide Vicentadarrius Ochoa) injection 200 mg  200 mg IntraMUSCular Q6H PRN Elba Johansen MD           SOCIAL AND OCCUPATIONAL HEALTH:  Quit smoking about 5 years ago. Smoker 1 - 3 ppd for 40 years. There is no history of TB or TB exposure. There is no asbestos or silica dust exposure. The patient reports no coal, foundry, quarry or [employer] exposure. There is no recent travel history noted. The patient denies a history of recreational or IV drug use. No hot tub exposure. The patient has no pets. Hobbies include riding his motorcycle. The patient denies excessive alcohol intake. The patient reports no birds or exotic animals. He is , has 4 stepchildren. He is currently on disability but was a  for 30 years. He rides a 'spider\" motorcycle.  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 [place]. Tahira Reyes nerve in back    BACK SURGERY  2017    COLONOSCOPY  2007    multiple colon polyps    COLONOSCOPY  2012    COLONOSCOPY    COLONOSCOPY  2022    polyp; diverticula--sarah    COLONOSCOPY N/A 2022    COLONOSCOPY POLYPECTOMY HOT BIOPSY (Snare) performed by Betty Humphreys MD at [address]      lennie cataracts implant    HERNIA REPAIR      umbilical    PACEMAKER PLACEMENT  10/03/2017    Medtronic dual chamber    Dr. Fabián Blandon        FAMILY HISTORY: A review of medical events in the patient's family , including disease which may be hereditary or place the patient at risk were reviewed. Family History   Problem Relation Age of Onset    Cancer Mother         skin    Heart Disease Mother     High Blood Pressure Father     Amyotrophic lateral sclerosis Father     Cancer Brother     High Blood Pressure Brother     Diabetes Brother       Family Status   Relation Name Status    Mother      Father      Sister malka     Sister sarah Alive    Sister kaela Alive    Sister Sil     Brother lisa     Brother lucrecia     Brother mars     Brother renay         REVIEW OF SYSTEMS: The patients health assessment form was reviewed. Constitutional: General health fair . The patient complains of weight changes. The patient denies any recent fevers or weakness. Head: Patient denies any history of trauma, convulsive disorder or syncope. Skin:  Patient denies history of changes in pigmentation, eruptions or pruritus.     Eyes: Patient denies any history of color blindness, photophobia, diplopia, inflammation, cataracts or glaucoma. Ears: Patient denies history of deafness, tinnitus, pain, discharge or recurrent infections. Nose/Throat: Patient admits to drainage. Patient denies history of soreness of mouth or tongue. Patient denies history of hoarseness, voice changes, sore throats or recurrent tonsillitis. Lymphatic:  Patient denies history of enlargement, inflammation, pain or suppuration. He admits to history of lymphoma. Cardiovascular:  Patient admits to history of palpations, & chest pain. He admits to orthopnea & cold extremities. He admits to edema. Biventricular pacemaker for sinus node dysfunction.    Pulmonary:  Patient denies wheezing, dyspnea, exertional dyspnea, nocturnal dyspnea. He denies cough. He denies hemoptysis or pleurisy. He complains of sleep disorder. He reports symptoms of sleep apnea. Has nocturnal hypoxemia with O2 at 2.5 L N/C. Gastrointestinal: Patient denies changes in appetite. He denies dysphagia, odynophagia or abdominal pain. He denies hematochezia, melena, bowel habit changes or hemorrhoids. Allergy/Immunology: The patient denies itching, hives, or angioedema. The patient denies a problem with seasonal/environmental allergies. Genitourinary:  The patient denies a history of stones or UTI. Vascular: No history of peripheral vascular disease, carotid occlusive disease or aneurysms. Musculoskeletal: The patient reports a history of arthritis & joint pains. He complains of loss of strength. He severe bilateral upper shoulder discomfort   and previous laminectomy with ongoing persistent hip pain and inability to exercise. OA noted. Complains of right thumb discomfort. Breasts: He denies history of masses, lumps, pain, or nipple changes. The patient denies a history of breast cancer. Neurological: Patient denies vertigo. He denies twitching, convulsions, loss of consciousness.  No memory problems. Psychological: Patient denies moodiness, depression or anxiety. He denies obsessions, delusions, illusions or hallucinations. Cancer: No history of cancer reported. Endocrine: No history of goiter. No history of thyroid disorders. He reports diabetes. He is taking 70/30 insulin 20 units at bedtime, reports BG of 100-110's in the morning without overnight hypoglycemic symptoms. He is Rx 12 units qAM and 8 units qHS. His last A1c was 6.3 from 10/3/17. Hematopoietic:  He denies history of bleeding disorders or easy bruising. He denies hypercoagulable disorder. Integumentory: No skin lesion, rashes, venous stasis or varicose veins. They denies any report of skin cancer. Rheumatic:  The patient denies polyarthritis or inflammatory joint disease. PHYSICAL EXAMINATION:   Vitals:    07/08/22 0600 07/08/22 0742 07/08/22 1128 07/08/22 1645   BP:  106/82  119/67   Pulse:  75 84 63   Resp:  18 24 22   Temp:  97.5 °F (36.4 °C)     TempSrc:  Oral     SpO2:  95% 100%    Weight: 224 lb (101.6 kg)        Constitutional: A morbid obese  69 y.o. male who is alert, oriented, cooperative and in no apparent distress. Head was normocephalic and atraumatic. EENT: EOMI ESTRELLA. MMM. No icterus. No conjunctival injections. Septum was midline, mucosa was without erythema, exudates or cobblestoning. No thrush. Neck: Supple without thyromegaly. No elevated JVP. Trachea was midline. No carotid bruits. Respiratory: Symmetrical without dullness to percussion. Faint crackles on right. No wheezes, rhonchi. No intercostal retraction or use of accessory muscles. No egophony. Cardiovascular: Nonpalpable PMI. Regular without murmur, clicks, gallops or rubs. No left or right ventricular heave. Pacer in situ  Pulses:  Carotid, radial and femoral pulses were equal bilaterally. Abdomen: Obese, rounded and soft without organomegaly. No rebound, rigidity. Lymphatic: No lymphadenopathy.   Musculoskeletal: Ambulates appears moderately sclerotic.     CT SCAN CHEST 9/2021: Intervally new small to moderate left and trace right pleural effusions. Mild thickening of secondary interlobular septa with ground-glass attenuation at some levels, compatible with an element of interstitial pulmonary edema. Patchy mild subsegmental atelectasis bilaterally, worse within the basal left lower lobe and inferior lingula. Multiple essentially stable pulmonary nodules bilaterally. Intervally new small volume ascites and flank edema. Mild upper abdominal lymphadenopathy. CT SCAN CHEST (2/2019):  LUNGS: The tiny lung nodules which were previously present are stable. No pleural effusion or pneumothorax is seen. HEART: Unremarkable. AORTA: The aorta appears to be unremarkable. MEDIASTINUM: The mediastinum nonspecific adenopathy noted most pronounced in the sub-subcarinal space. UPPER ABDOMEN: Unremarkable. OTHER:Unremarkable    CT SCAN CHEST (12/2017): Airways appear patent. A few chronic bands of atelectasis or fibrosis in each lower lobe. Solid noncalcified subpleural nodule posterior lateral aspect right upper lobe at the level the yecenia measures about 3.8 mm whereas previously it measured about 3.1 mm. Other smaller peripheral nodular densities in the right upper lobe are stable. Peripheral solid nodule laterally left upper lobe at the level the yecenia measuring about 3.7 mm is unchanged. CT SCAN CHEST (7/2016): We reviewed the films during the inter-disciplinary rounds/conference, which showed mild cardiomegaly with mediastinal lipomatosis and nonspecific lymph nodes in the mediastinum. 2. 4 mm noncalcified nodules in the upper lobe bilaterally which are unchanged since 2012. CT SCAN CHEST (7/2015): IMPRESSION: 1. Indeterminate pulmonary nodules are stable back to August 22,2012. No evidence for new pulmonary nodule, mass, or lymphadenopathy. 2. No acute pleural-parenchymal disease. 3. Borderline heart size.       SLEEP STUDY: (2013) [address] [place] [address] Road  [address] [place] [address]

## 2022-07-08 NOTE — CONSULTS
Department of Internal Medicine  Nephrology Attending Consult Note      Reason for Consult: RADHA  Requesting Physician:  Dr Ne Messer:  SOB    History Obtained From:  patient, electronic medical record    HISTORY OF PRESENT ILLNESS:    Briefly Mr. Rocha is a 58-year-old  man with history of HTN, DM Type II, history of ischemic ATN for which he required HD treatment for 4 days with adequate renal function recovery in May 2017, atrial fibrillation, HFpEF, NIKO, pacemaker placement, hypothyroidism, hyperlipidemia, status post back surgery x2, and stage 3 CKD with hypertensive nephrosclerosis who was admitted on 7/7/22 with decompensated CHF and this consultation was requested for management of diuresis and RADHA. Patient received iv bumex after admission and has less SOB today.       Past Medical History:        Diagnosis Date    RADHA (acute kidney injury) ([facility] 3/10/2022    Atrial fibrillation ([facility]     Carpal tunnel syndrome     Encounter regarding vascular access for dialysis for ESRD ([facility] 3/12/2022    Hyperlipidemia     Hypertension     Hypothyroidism     Lung nodule     Nocturnal hypoxemia due to obesity 8/8/2013    Obesity     NIKO (obstructive sleep apnea) 8/8/2013    [facility]    Osteoarthritis     Pinched nerve     Lumbar    Restrictive lung disease secondary to obesity 7/25/2016    Sinus node dysfunction (HCC)     Type II or unspecified type diabetes mellitus without mention of complication, not stated as uncontrolled      Past Surgical History:        Procedure Laterality Date    BACK SURGERY  2012    [facility]. Megan ySeda nerve in back    BACK SURGERY  05/30/2017    COLONOSCOPY  2007    multiple colon polyps    COLONOSCOPY  06/04/2012    COLONOSCOPY    COLONOSCOPY  04/26/2022    polyp; diverticula--sarah    COLONOSCOPY N/A 4/26/2022    COLONOSCOPY POLYPECTOMY HOT BIOPSY (Snare) performed by Tristan De Leon MD at [address] [place] [address] lennie cataracts implant    HERNIA REPAIR      umbilical    PACEMAKER PLACEMENT  10/03/2017    Medtronic dual chamber    Dr. Darrick Canada Right      Current Medications:    Current Facility-Administered Medications: insulin lispro (HUMALOG) injection vial 0-6 Units, 0-6 Units, SubCUTAneous, TID   insulin lispro (HUMALOG) injection vial 0-3 Units, 0-3 Units, SubCUTAneous, Nightly  [START ON 7/9/2022] bumetanide (BUMEX) tablet 2 mg, 2 mg, Oral, Daily  bumetanide (BUMEX) injection 1 mg, 1 mg, IntraVENous, Nightly  aspirin EC tablet 81 mg, 81 mg, Oral, Daily  atorvastatin (LIPITOR) tablet 40 mg, 40 mg, Oral, Nightly  Vitamin D (CHOLECALCIFEROL) tablet 1,000 Units, 1,000 Units, Oral, Daily  apixaban (ELIQUIS) tablet 5 mg, 5 mg, Oral, BID  levothyroxine (SYNTHROID) tablet 88 mcg, 88 mcg, Oral, Daily  metoprolol succinate (TOPROL XL) extended release tablet 25 mg, 25 mg, Oral, Daily  sodium chloride flush 0.9 % injection 5-40 mL, 5-40 mL, IntraVENous, 2 times per day  sodium chloride flush 0.9 % injection 5-40 mL, 5-40 mL, IntraVENous, PRN  0.9 % sodium chloride infusion, , IntraVENous, PRN  polyethylene glycol (GLYCOLAX) packet 17 g, 17 g, Oral, Daily PRN  acetaminophen (TYLENOL) tablet 650 mg, 650 mg, Oral, Q6H PRN **OR** acetaminophen (TYLENOL) suppository 650 mg, 650 mg, Rectal, Q6H PRN  insulin lispro (HUMALOG) injection vial 10 Units, 10 Units, SubCUTAneous, TID   glucose chewable tablet 16 g, 4 tablet, Oral, PRN  dextrose 50 % IV solution, 25 mL, IntraVENous, PRN **OR** dextrose 50 % IV solution, 50 mL, IntraVENous, PRN  glucagon (rDNA) injection 1 mg, 1 mg, IntraMUSCular, PRN  dextrose 5 % solution, 100 mL/hr, IntraVENous, PRN  insulin glargine-yfgn (SEMGLEE-YFGN) injection vial 45 Units, 45 Units, SubCUTAneous, Nightly  budesonide (PULMICORT) nebulizer suspension 500 mcg, 0.5 mg, Nebulization, BID **AND** Arformoterol Tartrate (BROVANA) nebulizer solution 15 mcg, 15 mcg, Nebulization, BID  metOLazone (ZAROXOLYN) tablet 2.5 mg, 2.5 mg, Oral, Once per day on Mon Wed Fri  spironolactone (ALDACTONE) tablet 25 mg, 25 mg, Oral, Once per day on Mon Wed Fri  albuterol (PROVENTIL) nebulizer solution 2.5 mg, 2.5 mg, Nebulization, Q6H PRN  trimethobenzamide (TIGAN) injection 200 mg, 200 mg, IntraMUSCular, Q6H PRN  Allergies:  Patient has no known allergies. Social History:    TOBACCO:  Never used tobacco  ETOH:  Never drank alcohol  DRUGS:  Never used recreational drugs    Family History:       Problem Relation Age of Onset    Cancer Mother         skin    Heart Disease Mother     High Blood Pressure Father     Amyotrophic lateral sclerosis Father     Cancer Brother     High Blood Pressure Brother     Diabetes Brother      REVIEW OF SYSTEMS:    CONSTITUTIONAL:  positive for  fatigue  EYES:  negative  HEENT:  negative  RESPIRATORY:  positive for  dry cough and dyspnea  CARDIOVASCULAR:  positive for  dyspnea  GASTROINTESTINAL:  negative  GENITOURINARY:  negative  INTEGUMENT/BREAST:  negative  HEMATOLOGIC/LYMPHATIC:  negative  ALLERGIC/IMMUNOLOGIC:  negative  ENDOCRINE:  positive for weight changes  MUSCULOSKELETAL:  negative  NEUROLOGICAL:  negative  BEHAVIOR/PSYCH:  negative  PHYSICAL EXAM:      Vitals:    VITALS:  /82   Pulse 84   Temp 97.5 °F (36.4 °C) (Oral)   Resp 24   Wt 224 lb (101.6 kg)   SpO2 100%   BMI 35.08 kg/m²   24HR INTAKE/OUTPUT:    Intake/Output Summary (Last 24 hours) at 7/8/2022 1515  Last data filed at 7/8/2022 1458  Gross per 24 hour   Intake 240 ml   Output 1035 ml   Net -795 ml     8HR INTAKE OUTPUT:  In: 240 [P.O.:240]  Out: 1035 [Urine:1035]     Constitutional:  Alert, elderly, well developed, no acute distress  HEENT:  PERRLA, normocephalic, atraumtic  Respiratory:  CTAB except bibasilar rales  Cardiovascular/Edema:  S1/S2, RRR  Gastrointestinal:  Soft, rounded, non-tender, obese  Neurologic:  AxOx3  Skin:  Warm, dry, dry mucous membranes   Other:  Prince- with urine noted, ++ edema    DATA:    CBC:   Lab Results   Component Value Date/Time    WBC 7.1 07/08/2022 06:11 AM    RBC 4.09 07/08/2022 06:11 AM    HGB 12.3 07/08/2022 06:11 AM    HCT 36.8 07/08/2022 06:11 AM    MCV 90.0 07/08/2022 06:11 AM    MCH 30.1 07/08/2022 06:11 AM    MCHC 33.4 07/08/2022 06:11 AM    RDW 16.6 07/08/2022 06:11 AM     07/08/2022 06:11 AM    MPV 9.7 07/08/2022 06:11 AM     CMP:    Lab Results   Component Value Date/Time     07/08/2022 06:12 AM    K 3.3 07/08/2022 06:12 AM    K 5.0 03/10/2022 04:28 PM    CL 89 07/08/2022 06:12 AM    CO2 29 07/08/2022 06:12 AM    BUN 61 07/08/2022 06:12 AM    CREATININE 1.7 07/08/2022 06:12 AM    GFRAA 49 07/08/2022 06:12 AM    LABGLOM 40 07/08/2022 06:12 AM    GLUCOSE 252 07/08/2022 06:12 AM    GLUCOSE 133 04/18/2012 08:43 AM    PROT 8.7 07/07/2022 11:27 AM    LABALBU 4.0 07/07/2022 11:27 AM    LABALBU 4.4 10/25/2011 09:50 AM    CALCIUM 10.0 07/08/2022 06:12 AM    BILITOT 1.0 07/07/2022 11:27 AM    ALKPHOS 129 07/07/2022 11:27 AM    AST 39 07/07/2022 11:27 AM    ALT 28 07/07/2022 11:27 AM     Magnesium:    Lab Results   Component Value Date/Time    MG 1.7 07/08/2022 06:12 AM     Phosphorus:    Lab Results   Component Value Date/Time    PHOS 4.7 07/08/2022 06:12 AM     Uric Acid:    Lab Results   Component Value Date/Time    LABURIC 14.2 06/29/2022 10:47 AM     Last 3 Troponin:    Lab Results   Component Value Date/Time    TROPONINI 0.05 05/26/2017 04:10 AM    TROPONINI 0.04 05/25/2017 08:00 PM    TROPONINI 0.05 05/25/2017 01:20 AM     U/A:    Lab Results   Component Value Date/Time    COLORU Yellow 03/10/2022 07:18 PM    PROTEINU Negative 03/10/2022 07:18 PM    PHUR 5.0 03/10/2022 07:18 PM    LABCAST FEW 05/25/2017 02:00 AM    WBCUA 1-3 03/10/2022 07:18 PM    RBCUA 1-3 03/10/2022 07:18 PM    BACTERIA FEW 03/10/2022 07:18 PM    CLARITYU Clear 03/10/2022 07:18 PM    SPECGRAV 1.025 03/10/2022 07:18 PM    LEUKOCYTESUR Negative 03/10/2022 07:18 PM

## 2022-07-08 NOTE — PROGRESS NOTES
[facility]  Internal Medicine Department    Attending Physician Statement:  Angela Schumacher. D.O. I have discussed the case, including pertinent history and exam findings with the resident. I agree with the assessment, plan and orders as documented by the resident. Patient here for routine follow up of medical problems. Acute decompensated HFpEF: Patient -1 L over the last 24 hours, strict I's and O's, nephrology consulted for aid in diuresis and blood pressure management, patient ordered 2 mg oral Bumex for diuresis, patient started on Entresto by nephrology, and will continue current diuretic management, plan to have patient establish heart failure clinic as an outpatient since discharge    RADHA/cardiorenal syndrome: Continue care per cardiology including nuclear renal scan to continue diuresis as well as stopping metolazone and Aldactone     Atrial fibrillation with controlled rate: Continue metoprolol and Eliquis, patient currently has pacemaker    Hypothyroidism: Elevated TSH likely due to noncompliance, continue current Synthroid dose    Remainder of medical problems as per resident note.

## 2022-07-08 NOTE — PROGRESS NOTES
Dr. Bernardo Osborne consulted via answering service.  Electronically signed by Victoria Garcia RN on 7/8/2022 at 1:45 PM

## 2022-07-08 NOTE — CARE COORDINATION
7/8/2022 - Care Coordination - admitted for CHF. Sent to ED from CHF clinic. Heart Failure nurse consult. IV bumex twice daily. On room air. K 3.3, BUN 61, Cr 1.7 today. Spoke with pt in room - PCP is Dr Luna Held at [facility] internal medicine clinic. Pharmacy is [facility] in [place]. Lives with his wife in a 2 story home with 3 steps to enter home. Bed/bath located on 1st floor but has a flight of stairs to bathroom to shower. Hx HHC but unsure which HHC. Hx DER[facility] at [facility]. DME - fww, cane, shower bench. Hx home oxygen supplied by [facility]. Plan is to discharge home when medically stable. His wife will provide transportation home. SW/CM will follow.

## 2022-07-08 NOTE — PROGRESS NOTES
[facility]  Internal Medicine Residency Program  Progress Note - House Team     Patient:  Steff Ortiz 69 y.o. male MRN: [id number]     Date of Service: 7/8/2022     CC:  Shortness of breath ( hx CHF, sent from clinic because of fluid overload) and B/L leg swelling  Overnight events: The patient had no significant overnight complains. He slept well and feels he is getting better. Subjective     Patient was seen and examined this morning at bedside in no acute distress. Overnight the patient had no specific complains. He feels his swelling has decreased a bit and he is getting better. Patient has normal oxygen saturation in room air without need of any oxygen support. He didn't use his CPAP to sleep the last day which is ordered from today. Patient lost 2 lbs of weight since yesterday after the start of diuretics. Objective     Physical Exam:  Vitals: /82   Pulse 84   Temp 97.5 °F (36.4 °C) (Oral)   Resp 24   Wt 224 lb (101.6 kg)   SpO2 100%   BMI 35.08 kg/m²     I & O - 24hr: I/O this shift:  In: 240 [P.O.:240]  Out: 1035 [Urine:1035]   General Appearance: alert, orientated and cooperative  HEENT:  Normocephalic and non traumatic  Neck: no JVD, no adenopathy, non tender, no palpable mass  Lung: B/L decreased air entry, NVBS, rales present in the right infra axillary region  Heart: S1 and S2 were heard with no murmurs and added sound  Abdomen: distended, soft, non tender, no organomegaly, no palpable masses  Extremities:  B/l pitting edema present, non tender  Musculokeletal: No joint swelling, no muscle tenderness. ROM normal in all joints of extremities.    Neurologic: alert, well orientated to time place and person, cooperative  Subject  Pertinent Labs & Imaging Studies   carmela  CBC with Differential:    Lab Results   Component Value Date/Time    WBC 7.1 07/08/2022 06:11 AM    RBC 4.09 07/08/2022 06:11 AM    HGB 12.3 07/08/2022 06:11 AM    HCT 36.8 07/08/2022 06:11 AM    PLT 209 07/08/2022 06:11 AM    MCV 90.0 07/08/2022 06:11 AM    MCH 30.1 07/08/2022 06:11 AM    MCHC 33.4 07/08/2022 06:11 AM    RDW 16.6 07/08/2022 06:11 AM    SEGSPCT 68 04/27/2011 10:30 AM    LYMPHOPCT 21.6 07/08/2022 06:11 AM    MONOPCT 9.8 07/08/2022 06:11 AM    EOSPCT 7 10/05/2010 10:52 AM    BASOPCT 1.3 07/08/2022 06:11 AM    MONOSABS 0.70 07/08/2022 06:11 AM    LYMPHSABS 1.54 07/08/2022 06:11 AM    EOSABS 0.37 07/08/2022 06:11 AM    BASOSABS 0.09 07/08/2022 06:11 AM     BMP:    Lab Results   Component Value Date/Time     07/08/2022 06:12 AM    K 3.3 07/08/2022 06:12 AM    K 5.0 03/10/2022 04:28 PM    CL 89 07/08/2022 06:12 AM    CO2 29 07/08/2022 06:12 AM    BUN 61 07/08/2022 06:12 AM    LABALBU 4.0 07/07/2022 11:27 AM    LABALBU 4.4 10/25/2011 09:50 AM    CREATININE 1.7 07/08/2022 06:12 AM    CALCIUM 10.0 07/08/2022 06:12 AM    GFRAA 49 07/08/2022 06:12 AM    LABGLOM 40 07/08/2022 06:12 AM    GLUCOSE 252 07/08/2022 06:12 AM    GLUCOSE 133 04/18/2012 08:43 AM       XR CHEST (2 VW)   Final Result   Cardiomegaly and pulmonary vascular congestion/early CHF. Resident's Assessment and Plan     Assessment and Plan:    Neurology:    - Non compliance to his medications 2/2 ? Dementia, or may be he needs home house care   - patient misses his medications usually,    - might have dementia or problems at home to be non compliant    -needs a assessment to rule out     Plan: home house care and HF clinic at discharge     Cardiology:    1. SOB and B/L limb edema 2/2 acute on chronic heart failure exacerbation  - Hx of HFpEF, SOB improving  - B/L pitting edema improving, patient lost 2 pounds of weight  - diuretics dose needs to be increased to meet the goal of losing 2-3 kg of body weight daily    Plan: 1. Increase Bumetanide as 2mg at the morning and 1 mg at the afternoon            2. Monitor kidney function, trending up now and follow on electrolytes changes    2.  Persistent Atrial Fibrillation, rate controlled, metorolol 25 mg daily  - Under Apixaban for thrombosis prophylaxsis    3. Hypertension:  - Blood pressure controlled under Metoprolol 25mg, and other diuretics  - BP within normal ranges, continue the same medication    4. Hyperlipidemia:  - Under atorvastatin, stable    Pulmonology:  1. SOB 2/2 pulmonary edema 2/2 acute on chronic heart failure,    - SOB improving, 02 saturation > 90% even in room air    2. Obstructive sleep apnea    -  Patient under CPAP at night, didn't use it the last day    - again to start from today  Plan: Start CPAP while sleeping from today    Endocrine:    1. Hyperglycemia 2/2 non adherence to medications   - insulin glargine 45 units and insulin lispro on boards  - blood glucose improving than before    Plan: Monitor glucose q 4 hourly, adjust insulin dose as needed    2. Hypothyroidism  - patient on levothyroxine 88 mcg  - TSH on admission 21.630  - lack of compliance might be the cause    Plan: Start on the same dose of levothyroxine            Counseling about food habit and levothyroxine interaction    Renal:  CKD with GFR of 40, and Ck of 1.7 which was 1.4 previously  Cr trending up 2/2 ?  Use of diuretics and hypovolemia and acute heart failure exacerbation     Plan: Monitor Cr trends and electrolytes            Rosie Herrera MD, PGY-1  Attending physician: Dr. Hardy Jane., DO

## 2022-07-08 NOTE — CONSULTS
Patient currently admitted with diagnosis of Diastolic heart failure. Patient was alert and oriented during the consultation. He was engaged and asked appropriate questions throughout the education session. He is agreeable to self monitoring and calling his PCP when he notices symptoms of a heart failure fare up. Scheduling with the CHF clinic No: no, patient does not follow with cardiology. Brochure given to patient to discuss appropriate referral at hospital follow up visit. .    Future Appointments   Date Time Provider Stacie Kumari   7/11/2022  2:30 PM Adam Virk MD [facility]   7/18/2022  9:00 AM Saturnino Rosenbaum MD [facility]       Barriers to Care:  Contributing risk factors for Heart Failure are identified as dines out often. The patient is ordered:  Diet: ADULT DIET; Regular; 4 carb choices (60 gm/meal); Low Fat/Low Chol/High Fiber/2 gm Na; 1500 ml   Sodium controlled diet Yes  Fluid restriction daily ordered (fluid restriction recommended if patient is hyponatremic and/or diuretic is initiated or increased) Yes  FR:   Daily Weights:   Patient Vitals for the past 96 hrs (Last 3 readings):   Weight   07/08/22 0600 224 lb (101.6 kg)   07/07/22 1600 227 lb 4 oz (103.1 kg)     I/O:     Intake/Output Summary (Last 24 hours) at 7/8/2022 1049  Last data filed at 7/8/2022 0652  Gross per 24 hour   Intake 240 ml   Output --   Net 240 ml              We reviewed the introduction to Heart Failure, the HF zones, signs and symptoms to report on day 1 of onset, medications, medication compliance, the importance of obtaining daily weights, following a low sodium diet, reading food labels for the sodium content, keeping physician appointments, and smoking cessation. We discussed writing / tracking daily weights on a calendar / log because a 5 pound gain in 1 week can sneak up if you are not tracking it.           I advised patient they can reduce the risk for Heart Failure exacerbations by modifying / controlling the risk factors. We discussed self-managed care which includes the following:  to take medications as prescribed, report any intolerable side effects of medications to the cardiologist / doctor, do not just stop taking the medication; follow a cardiac heart healthy / low sodium diet; weigh yourself daily, exercise regularly- per doctor recommendation and not to smoke or use an excess amount of alcohol. We discussed calling the cardiologist / doctor with a weight gain of 3 pounds in one day or a total of 5 pounds or more in one week. Also, if you should have a significant weight loss of 3# or more in one day to call the doctor, they may need to decrease or hold the diuretic dose. On days you feels nauseated and not eating / drinking, having emesis or diarrhea,  informed to call the cardiologist  / doctor, they may need to decrease or hold diuretic to avoid dehydration. I stressed the importance of informing their cardiologist the first day of onset of any of the signs and symptoms in the \"Yellow Zone\" of the HF Zones. Patient verbalizes understanding. Greater than 30 minutes was spent educating patient. The Heart Failure Booklet given to the patient with additional patient education addressing:  · What is Heart Failure? · Things You Can Do to Live Well with HF  · How to Take Your Medications  · How to Eat Less Salt  · [place] its Salt?   · Exercising Well with Heart Failure  · Signs and symptoms of HF to report  · Weight Yourself Each Day  · Home Self Management- activity, weight tracking, taking medications as prescribed, meals /diet planning (sodium and fluid restriction), how to read food labels, keeping physician follow ups, smoking cessation, follow the Heart Failure Zones  · The Heart Failure zones  · Every Dose Every Day      Instructed  to call 911 if you have any of the following symptoms:    ·    Struggling to breathe unrelieved with rest,  ·    Having chest pain  ·    Have confusion or cant think clearly          Belkys Lechuga, RN BSN, RN  Heart Failure Navigator        CONGESTIVE HEART FAILURE (CHF) AHA GUIDELINES  (Must be completed for Primary Diagnosis CHF or History of CHF)    Discharge Plan:  I placed the Heart Failure Home Instructions in patient's discharge instructions. Per Heart Failure GWTG, the patient should have a follow-up appointment made within 7 days of discharge.     New Diagnosis No    ECHO Results most recent:  Lab Results   Component Value Date    LVEF 58 2021                                        Social History     Tobacco Use   Smoking Status Former Smoker    Packs/day: 0.10    Years: 35.00    Pack years: 3.50    Types: Cigarettes    Quit date: 2004    Years since quittin.6   Smokeless Tobacco Former User        Immunization History   Administered Date(s) Administered    COVID-19, MODERNA BLUE border, Primary or Immunocompromised, (age 12y+), IM, 100 mcg/0.5mL 2021, 2021, 2021    Influenza 2013    Influenza Virus Vaccine 10/21/2010, 2013, 10/28/2014, 10/14/2015, 2016, 10/30/2017, 2020    Influenza, High-dose, Morris Golas, 65 yrs +, IM (Fluzone) 10/11/2021    Influenza, Quadv, 6 mo and older, IM (Fluzone, Flulaval) 10/30/2017    Influenza, Quadv, IM, PF (6 mo and older Fluzone, Flulaval, Fluarix, and 3 yrs and older Afluria) 10/01/2018, 2020    Influenza, Quadv, adjuvanted, 65 yrs +, IM, PF (Fluad) 2020    Influenza, Triv, 3 Years and older, IM (Afluria (5 yrs and older) 2016    Influenza, Triv, inactivated, subunit, adjuvanted, IM (Fluad 65 yrs and older) 10/18/2018, 10/15/2019    Pneumococcal Conjugate 13-valent ([id number]) 2017    Pneumococcal Conjugate 7-valent (Prevnar7) 08/10/2007    Pneumococcal Polysaccharide ([id number]) 2012, 2018, 10/01/2018    Td, unspecified formulation 2005    Tdap (Boostrix, Adacel) 2016    Zoster Live (Zostavax) 10/23/2015          Angiotensin-Converting-Enzyme (ACE) inhibitor ordered:  [] Yes  [x] No (specify contraindication):    [] Contraindicated  [] Hypotensive patient who was at immediate risk of cardiogenic shock  [] Hospitalized patient who experienced marked azotemia  [] Other Contraindications  [x] Not Eligible  [] Not Tolerant  [] Patient Reason  [] System Reason  [] Other Reason    Angiotensin II receptor blockers (ARB) ordered:  [] Yes  [x] No (specify contraindication):    [] Contraindicated  [] Hypotensive patient who was at immediate risk of cardiogenic shock  [] Hospitalized patient who experienced marked azotemia  [] Other Contraindications    ARNI - Angiotensin Receptor Neprilysin Inhibitor ordered:  [] Yes  [x] No (specify contraindication):    [] ACE inhibitor use within the prior 36 hours  [] Allergy  [] Hyperkalemia  [] Hypotension  [] Renal dysfunction defined as creatinine > 2.5 mg/dL in men or > 2.0 mg/dL in women  [] Other Contraindications  [] Not Eligible  [] Not Tolerant  [] Patient Reason  []System Reason  []Other Reason      Beta Blocker (Carvedilol, Metoprolol Succinate, or Bisoprolol) ordered:    [x] Yes Toprol XL  [] No (specify contraindication):    [] Contraindicated  [] Asthma  [] Fluid Overload  [] Low Blood Pressure  [] Patient recently treated with an intravenous positive inotropic agent  [] Other Contraindications  [] Not Eligible  [] Not Tolerant  [] Patient Reason  [] System Reason    SGLT2 Inhibitor ordered:  [] Yes  [x] No (specify contraindication):    [] Contraindicated  [] Patient currently on dialysis  [] Ketoacidosis  [] Known hypersensitivity to the medication  [] Type I diabetes (not approved for use in patients with Type I diabetes due to increased risk of ketoacidosis)  [] Other Contraindications  [] Not Eligible  [] Not Tolerant  [] Patient Reason  [x] System Reason  [] Other Reason    Aldosterone Antagonist ordered:  [] Yes  [x] No (specify contraindication):    [] Contraindicated  [] Allergy due to aldosterone receptor antagonist  [] Hyperkalemia  [] Renal dysfunction defined as creatinine >2.5 mg/dL in men or >2.0 mg/dL in women.   [] Other contraindications  [] Not Eligible  [] Not Tolerant  [] Patient Reason  [] System Reason  [] Other Reason

## 2022-07-08 NOTE — PLAN OF CARE
Patient's chart updated to reflect:      . - HF care plan, HF education points and HF discharge instructions.  -Orders: 2 gram sodium diet, daily weights, I/O.  -PCP and cardiology follow up appointments to be scheduled within 7 days of hospital discharge. -CHF education session will be provided to the patient prior to hospital discharge.     Damien Schumacher RN RN, BSN  Heart Failure Navigator

## 2022-07-09 LAB
ANION GAP SERPL CALCULATED.3IONS-SCNC: 16 MMOL/L (ref 7–16)
ANION GAP SERPL CALCULATED.3IONS-SCNC: 18 MMOL/L (ref 7–16)
BASOPHILS ABSOLUTE: 0.07 E9/L (ref 0–0.2)
BASOPHILS RELATIVE PERCENT: 1 % (ref 0–2)
BUN BLDV-MCNC: 65 MG/DL (ref 6–23)
BUN BLDV-MCNC: 74 MG/DL (ref 6–23)
CALCIUM SERPL-MCNC: 10.1 MG/DL (ref 8.6–10.2)
CALCIUM SERPL-MCNC: 9.9 MG/DL (ref 8.6–10.2)
CHLORIDE BLD-SCNC: 86 MMOL/L (ref 98–107)
CHLORIDE BLD-SCNC: 89 MMOL/L (ref 98–107)
CHLORIDE URINE RANDOM: <20 MMOL/L
CO2: 27 MMOL/L (ref 22–29)
CO2: 28 MMOL/L (ref 22–29)
CREAT SERPL-MCNC: 1.6 MG/DL (ref 0.7–1.2)
CREAT SERPL-MCNC: 2.2 MG/DL (ref 0.7–1.2)
CREATININE URINE: 91 MG/DL (ref 40–278)
EOSINOPHILS ABSOLUTE: 0.39 E9/L (ref 0.05–0.5)
EOSINOPHILS RELATIVE PERCENT: 5.5 % (ref 0–6)
GFR AFRICAN AMERICAN: 36
GFR AFRICAN AMERICAN: 52
GFR NON-AFRICAN AMERICAN: 30 ML/MIN/1.73
GFR NON-AFRICAN AMERICAN: 43 ML/MIN/1.73
GLUCOSE BLD-MCNC: 239 MG/DL (ref 74–99)
GLUCOSE BLD-MCNC: 268 MG/DL (ref 74–99)
HCT VFR BLD CALC: 35.7 % (ref 37–54)
HEMOGLOBIN: 12 G/DL (ref 12.5–16.5)
IMMATURE GRANULOCYTES #: 0.14 E9/L
IMMATURE GRANULOCYTES %: 2 % (ref 0–5)
LYMPHOCYTES ABSOLUTE: 1.72 E9/L (ref 1.5–4)
LYMPHOCYTES RELATIVE PERCENT: 24.2 % (ref 20–42)
MAGNESIUM: 1.6 MG/DL (ref 1.6–2.6)
MCH RBC QN AUTO: 30 PG (ref 26–35)
MCHC RBC AUTO-ENTMCNC: 33.6 % (ref 32–34.5)
MCV RBC AUTO: 89.3 FL (ref 80–99.9)
METER GLUCOSE: 235 MG/DL (ref 74–99)
METER GLUCOSE: 274 MG/DL (ref 74–99)
METER GLUCOSE: 291 MG/DL (ref 74–99)
METER GLUCOSE: 293 MG/DL (ref 74–99)
MONOCYTES ABSOLUTE: 0.69 E9/L (ref 0.1–0.95)
MONOCYTES RELATIVE PERCENT: 9.7 % (ref 2–12)
NEUTROPHILS ABSOLUTE: 4.09 E9/L (ref 1.8–7.3)
NEUTROPHILS RELATIVE PERCENT: 57.6 % (ref 43–80)
PDW BLD-RTO: 16.6 FL (ref 11.5–15)
PHOSPHORUS: 4.2 MG/DL (ref 2.5–4.5)
PLATELET # BLD: 234 E9/L (ref 130–450)
PMV BLD AUTO: 9.9 FL (ref 7–12)
POTASSIUM SERPL-SCNC: 3.8 MMOL/L (ref 3.5–5)
POTASSIUM SERPL-SCNC: 3.9 MMOL/L (ref 3.5–5)
POTASSIUM, UR: 24.4 MMOL/L
RBC # BLD: 4 E12/L (ref 3.8–5.8)
SODIUM BLD-SCNC: 130 MMOL/L (ref 132–146)
SODIUM BLD-SCNC: 134 MMOL/L (ref 132–146)
SODIUM URINE: 44 MMOL/L
UREA NITROGEN, UR: 631 MG/DL (ref 800–1666)
WBC # BLD: 7.1 E9/L (ref 4.5–11.5)

## 2022-07-09 PROCEDURE — 99232 SBSQ HOSP IP/OBS MODERATE 35: CPT | Performed by: INTERNAL MEDICINE

## 2022-07-09 PROCEDURE — 94660 CPAP INITIATION&MGMT: CPT

## 2022-07-09 PROCEDURE — S5553 INSULIN LONG ACTING 5 U: HCPCS

## 2022-07-09 PROCEDURE — 84100 ASSAY OF PHOSPHORUS: CPT

## 2022-07-09 PROCEDURE — 99231 SBSQ HOSP IP/OBS SF/LOW 25: CPT | Performed by: INTERNAL MEDICINE

## 2022-07-09 PROCEDURE — 6370000000 HC RX 637 (ALT 250 FOR IP): Performed by: INTERNAL MEDICINE

## 2022-07-09 PROCEDURE — 2580000003 HC RX 258

## 2022-07-09 PROCEDURE — 82436 ASSAY OF URINE CHLORIDE: CPT

## 2022-07-09 PROCEDURE — 6360000002 HC RX W HCPCS

## 2022-07-09 PROCEDURE — 83735 ASSAY OF MAGNESIUM: CPT

## 2022-07-09 PROCEDURE — 84540 ASSAY OF URINE/UREA-N: CPT

## 2022-07-09 PROCEDURE — 84133 ASSAY OF URINE POTASSIUM: CPT

## 2022-07-09 PROCEDURE — 6370000000 HC RX 637 (ALT 250 FOR IP)

## 2022-07-09 PROCEDURE — 82962 GLUCOSE BLOOD TEST: CPT

## 2022-07-09 PROCEDURE — 6370000000 HC RX 637 (ALT 250 FOR IP): Performed by: STUDENT IN AN ORGANIZED HEALTH CARE EDUCATION/TRAINING PROGRAM

## 2022-07-09 PROCEDURE — 82570 ASSAY OF URINE CREATININE: CPT

## 2022-07-09 PROCEDURE — 2060000000 HC ICU INTERMEDIATE R&B

## 2022-07-09 PROCEDURE — 84300 ASSAY OF URINE SODIUM: CPT

## 2022-07-09 PROCEDURE — 80048 BASIC METABOLIC PNL TOTAL CA: CPT

## 2022-07-09 PROCEDURE — 94640 AIRWAY INHALATION TREATMENT: CPT

## 2022-07-09 PROCEDURE — 36415 COLL VENOUS BLD VENIPUNCTURE: CPT

## 2022-07-09 PROCEDURE — 85025 COMPLETE CBC W/AUTO DIFF WBC: CPT

## 2022-07-09 RX ORDER — INSULIN LISPRO 100 [IU]/ML
0-6 INJECTION, SOLUTION INTRAVENOUS; SUBCUTANEOUS NIGHTLY
Status: DISCONTINUED | OUTPATIENT
Start: 2022-07-09 | End: 2022-07-13 | Stop reason: HOSPADM

## 2022-07-09 RX ORDER — POTASSIUM CHLORIDE 20 MEQ/1
20 TABLET, EXTENDED RELEASE ORAL ONCE
Status: COMPLETED | OUTPATIENT
Start: 2022-07-09 | End: 2022-07-09

## 2022-07-09 RX ORDER — INSULIN LISPRO 100 [IU]/ML
14 INJECTION, SOLUTION INTRAVENOUS; SUBCUTANEOUS
Status: DISCONTINUED | OUTPATIENT
Start: 2022-07-09 | End: 2022-07-09

## 2022-07-09 RX ORDER — INSULIN GLARGINE-YFGN 100 [IU]/ML
50 INJECTION, SOLUTION SUBCUTANEOUS NIGHTLY
Status: DISCONTINUED | OUTPATIENT
Start: 2022-07-09 | End: 2022-07-10

## 2022-07-09 RX ORDER — LIDOCAINE 4 G/G
1 PATCH TOPICAL DAILY
Status: COMPLETED | OUTPATIENT
Start: 2022-07-09 | End: 2022-07-10

## 2022-07-09 RX ORDER — INSULIN LISPRO 100 [IU]/ML
0-12 INJECTION, SOLUTION INTRAVENOUS; SUBCUTANEOUS
Status: DISCONTINUED | OUTPATIENT
Start: 2022-07-09 | End: 2022-07-13 | Stop reason: HOSPADM

## 2022-07-09 RX ORDER — INSULIN GLARGINE-YFGN 100 [IU]/ML
55 INJECTION, SOLUTION SUBCUTANEOUS NIGHTLY
Status: DISCONTINUED | OUTPATIENT
Start: 2022-07-09 | End: 2022-07-09

## 2022-07-09 RX ORDER — INSULIN LISPRO 100 [IU]/ML
15 INJECTION, SOLUTION INTRAVENOUS; SUBCUTANEOUS
Status: DISCONTINUED | OUTPATIENT
Start: 2022-07-09 | End: 2022-07-10

## 2022-07-09 RX ORDER — MAGNESIUM SULFATE IN WATER 40 MG/ML
2000 INJECTION, SOLUTION INTRAVENOUS ONCE
Status: COMPLETED | OUTPATIENT
Start: 2022-07-09 | End: 2022-07-09

## 2022-07-09 RX ORDER — GABAPENTIN 300 MG/1
300 CAPSULE ORAL 2 TIMES DAILY
Status: DISCONTINUED | OUTPATIENT
Start: 2022-07-09 | End: 2022-07-13 | Stop reason: HOSPADM

## 2022-07-09 RX ORDER — MECOBALAMIN 5000 MCG
5 TABLET,DISINTEGRATING ORAL NIGHTLY
Status: DISCONTINUED | OUTPATIENT
Start: 2022-07-09 | End: 2022-07-13 | Stop reason: HOSPADM

## 2022-07-09 RX ADMIN — Medication 1000 UNITS: at 08:35

## 2022-07-09 RX ADMIN — ASPIRIN 81 MG: 81 TABLET, COATED ORAL at 08:35

## 2022-07-09 RX ADMIN — POTASSIUM CHLORIDE 20 MEQ: 1500 TABLET, EXTENDED RELEASE ORAL at 08:35

## 2022-07-09 RX ADMIN — METOPROLOL SUCCINATE 25 MG: 25 TABLET, FILM COATED, EXTENDED RELEASE ORAL at 08:35

## 2022-07-09 RX ADMIN — LEVOTHYROXINE SODIUM 88 MCG: 0.09 TABLET ORAL at 06:28

## 2022-07-09 RX ADMIN — Medication 5 MG: at 21:18

## 2022-07-09 RX ADMIN — APIXABAN 5 MG: 5 TABLET, FILM COATED ORAL at 21:18

## 2022-07-09 RX ADMIN — APIXABAN 5 MG: 5 TABLET, FILM COATED ORAL at 08:35

## 2022-07-09 RX ADMIN — MAGNESIUM SULFATE HEPTAHYDRATE 2000 MG: 40 INJECTION, SOLUTION INTRAVENOUS at 08:37

## 2022-07-09 RX ADMIN — SACUBITRIL AND VALSARTAN 1 TABLET: 24; 26 TABLET, FILM COATED ORAL at 21:18

## 2022-07-09 RX ADMIN — INSULIN LISPRO 15 UNITS: 100 INJECTION, SOLUTION INTRAVENOUS; SUBCUTANEOUS at 12:20

## 2022-07-09 RX ADMIN — BUMETANIDE 2 MG: 1 TABLET ORAL at 08:35

## 2022-07-09 RX ADMIN — ARFORMOTEROL TARTRATE 15 MCG: 15 SOLUTION RESPIRATORY (INHALATION) at 13:25

## 2022-07-09 RX ADMIN — INSULIN LISPRO 6 UNITS: 100 INJECTION, SOLUTION INTRAVENOUS; SUBCUTANEOUS at 08:34

## 2022-07-09 RX ADMIN — INSULIN LISPRO 4 UNITS: 100 INJECTION, SOLUTION INTRAVENOUS; SUBCUTANEOUS at 17:09

## 2022-07-09 RX ADMIN — BUDESONIDE 500 MCG: 0.5 SUSPENSION RESPIRATORY (INHALATION) at 13:25

## 2022-07-09 RX ADMIN — GABAPENTIN 300 MG: 300 CAPSULE ORAL at 21:18

## 2022-07-09 RX ADMIN — SACUBITRIL AND VALSARTAN 1 TABLET: 24; 26 TABLET, FILM COATED ORAL at 08:35

## 2022-07-09 RX ADMIN — ACETAMINOPHEN 325MG 650 MG: 325 TABLET ORAL at 08:35

## 2022-07-09 RX ADMIN — INSULIN LISPRO 6 UNITS: 100 INJECTION, SOLUTION INTRAVENOUS; SUBCUTANEOUS at 12:19

## 2022-07-09 RX ADMIN — INSULIN LISPRO 15 UNITS: 100 INJECTION, SOLUTION INTRAVENOUS; SUBCUTANEOUS at 17:10

## 2022-07-09 RX ADMIN — SODIUM CHLORIDE, PRESERVATIVE FREE 10 ML: 5 INJECTION INTRAVENOUS at 21:18

## 2022-07-09 RX ADMIN — INSULIN GLARGINE-YFGN 50 UNITS: 100 INJECTION, SOLUTION SUBCUTANEOUS at 21:18

## 2022-07-09 RX ADMIN — SODIUM CHLORIDE, PRESERVATIVE FREE 10 ML: 5 INJECTION INTRAVENOUS at 08:35

## 2022-07-09 RX ADMIN — INSULIN LISPRO 3 UNITS: 100 INJECTION, SOLUTION INTRAVENOUS; SUBCUTANEOUS at 21:18

## 2022-07-09 RX ADMIN — INSULIN LISPRO 15 UNITS: 100 INJECTION, SOLUTION INTRAVENOUS; SUBCUTANEOUS at 08:33

## 2022-07-09 RX ADMIN — ATORVASTATIN CALCIUM 40 MG: 40 TABLET, FILM COATED ORAL at 21:18

## 2022-07-09 ASSESSMENT — PAIN SCALES - GENERAL: PAINLEVEL_OUTOF10: 7

## 2022-07-09 ASSESSMENT — PAIN DESCRIPTION - PAIN TYPE: TYPE: ACUTE PAIN

## 2022-07-09 ASSESSMENT — PAIN DESCRIPTION - ONSET: ONSET: ON-GOING

## 2022-07-09 ASSESSMENT — PAIN DESCRIPTION - DESCRIPTORS: DESCRIPTORS: ACHING;DISCOMFORT;DULL

## 2022-07-09 ASSESSMENT — PAIN - FUNCTIONAL ASSESSMENT: PAIN_FUNCTIONAL_ASSESSMENT: PREVENTS OR INTERFERES SOME ACTIVE ACTIVITIES AND ADLS

## 2022-07-09 ASSESSMENT — PAIN DESCRIPTION - FREQUENCY: FREQUENCY: CONTINUOUS

## 2022-07-09 ASSESSMENT — PAIN DESCRIPTION - LOCATION: LOCATION: BACK

## 2022-07-09 ASSESSMENT — PAIN DESCRIPTION - ORIENTATION: ORIENTATION: LOWER

## 2022-07-09 NOTE — PROGRESS NOTES
[facility]  Internal Medicine Residency Program  Progress Note - House Team     Patient:  Winston Eye 69 y.o. male MRN: [id number]     Date of Service: 7/9/2022     CC: SOB and Lower limb swelling  Overnight events:   1. Blood glucose went from 276 to 336. Sliding scale was changed to MDISS  2. FENa 0.6%    Subjective     Patient was seen and examined this morning at bedside in no acute distress. He complains of back pain 8/10 induration. He doesn't complain of any SOB, cough, chest pain, or limb swelling. He removes his CPAP at night after having it on for about an hour due to discomfort. Objective     Physical Exam:  · Vitals: BP (!) 96/56   Pulse 65   Temp 98 °F (36.7 °C) (Oral)   Resp 21   Wt 222 lb 14.2 oz (101.1 kg)   SpO2 96%   BMI 34.91 kg/m²     · I & O - 24hr: No intake/output data recorded. · General Appearance: alert, cooperative and no distress  · HEENT:  Head: Normocephalic, no lesions, without obvious abnormality. · Neck: no adenopathy, no carotid bruit, no JVD, supple, symmetrical, trachea midline and thyroid not enlarged, symmetric, no tenderness/mass/nodules  · Lung: Decreased breath sounds bilaterally, Rales heard in L and R low lung fields  · Heart: regular rate and rhythm, S1, S2 normal, no murmur, click, rub or gallop  · Abdomen: soft, non-tender; bowel sounds normal; no masses,  no organomegaly  · Extremities:  Bilateral lower limb edema +1. Atraumatic, no cyanosis. · Musculokeletal: No joint swelling, no muscle tenderness. ROM normal in all joints of extremities.    · Neurologic: Mental status: Alert, oriented, thought content appropriate  Subject  Pertinent Labs & Imaging Studies   carmela  CBC with Differential:    Lab Results   Component Value Date/Time    WBC 7.1 07/09/2022 03:27 AM    RBC 4.00 07/09/2022 03:27 AM    HGB 12.0 07/09/2022 03:27 AM    HCT 35.7 07/09/2022 03:27 AM     07/09/2022 03:27 AM    MCV 89.3 07/09/2022 03:27 AM    MCH 30.0 07/09/2022 03:27 AM    MCHC 33.6 07/09/2022 03:27 AM    RDW 16.6 07/09/2022 03:27 AM    SEGSPCT 68 04/27/2011 10:30 AM    LYMPHOPCT 24.2 07/09/2022 03:27 AM    MONOPCT 9.7 07/09/2022 03:27 AM    EOSPCT 7 10/05/2010 10:52 AM    BASOPCT 1.0 07/09/2022 03:27 AM    MONOSABS 0.69 07/09/2022 03:27 AM    LYMPHSABS 1.72 07/09/2022 03:27 AM    EOSABS 0.39 07/09/2022 03:27 AM    BASOSABS 0.07 07/09/2022 03:27 AM     BMP:    Lab Results   Component Value Date/Time     07/09/2022 03:27 AM    K 3.9 07/09/2022 03:27 AM    K 5.0 03/10/2022 04:28 PM    CL 89 07/09/2022 03:27 AM    CO2 27 07/09/2022 03:27 AM    BUN 65 07/09/2022 03:27 AM    LABALBU 4.0 07/07/2022 11:27 AM    LABALBU 4.4 10/25/2011 09:50 AM    CREATININE 1.6 07/09/2022 03:27 AM    CALCIUM 10.1 07/09/2022 03:27 AM    GFRAA 52 07/09/2022 03:27 AM    LABGLOM 43 07/09/2022 03:27 AM    GLUCOSE 239 07/09/2022 03:27 AM    GLUCOSE 133 04/18/2012 08:43 AM       XR CHEST (2 VW)   Final Result   Cardiomegaly and pulmonary vascular congestion/early CHF. Resident's Assessment and Plan     Assessment and Plan:      1. SOB and Limb edema likely 2/2 acute heart failure exacerbation - improving  - Hx of HFpEF (EF 55-60% in 2021)  - SOB worsened with walking, improved with rest  - Associated with nonpurulent dry cough  - Wife noticed increasing lower limb swelling   - Hx of noncompliance with medication  - Bilateral lower limb edema at +1  Plan:  A. Bumetanide 2 mg IV BID  B Metolazone and Spironolactone held per Nephrology consult  C. Entresto 24-26 mg PO BID started per Nephrology consult  C. Strict I/Os  D. Fluid restriction (<2L/day)  E. Salt restriction (<2g/day)  F. Monitor daily weight       2. Hyperglycemia 2/2 non adherence of diabetic medication.  - Hx of Type 2 DM  - Hx of noncompliance  - Blood glucose on presentation to the   - BS now at 274  Plan:  A. Lantis 50 units nightly  B. Lispro 15 units QAC  C.  Medium dose insulin sliding scale  D. Hypoglycemia protocol  E. BS Q AC/HS     3. Elevated creatinine likely 2/2 acute heart failure exacerbation - improving  - Creatinine levels ED at 1.5  - Creatinine trending down from 1.7 to 1.6  Plan:  A. Metolazone and Spironolactone held per Nephrology consult  B. Trend BNP     4. Hypoxia likely 2/2 acute exacerbation of heart failure - improving  - Hypoxia 85% measured in clinic  - SpO2 in ED at 95%  - [place] SpO2  96% at room air  Plan:  A. CPAP at night  B. Nasal canula 4L/min at night if CPAP removed  C. Monitor SpO2    5. Hypokalemia likely 2/2 diuresis - improving  - Potassium trending up to 3.9  Plan:  A. KCL 20 mEq PO once    6. Prolonged QTc  Plan  A. Magnesium sulphate 2000 mg IV once    7. Elevated TSH likely 2/2 non adherence to levothyroxine  - Hx of hypothyroidism  Plan:  A. Continue Levothyroxine 88 mcg PO daily  B. Monitor TSH    8. Hx of HLD  Plan:  - Continue Atorvastatin 40 mg daily     9. Hx of OHS/NIKO  Plan:  -  CPAP at night, nasal canula at 4L/min at night if intolerable  - Continue ICH/LAB per Pulmonology consult  - To follow up with pulmonology clinic as OP     10. Hx of Chronic lower back pain     11. Hx of persistent atrial fibrillation  Plan:   A. Continue Apixaban 5 mg      12. Hx of HTN  Plan:  A. Metoprolol Succinate 25 mg PO daily     13. Hx of hypothyroidism     14. Hx of Sinus Node Dysfunction -  Dual chamber pacemaker placed in 2017     15. Vitamin D deficiency  Plan:  Continue Vit D    PT/OT evaluation: -/-  DVT prophylaxis/ GI prophylaxis: Eliquis 5 mg PO BID/ Regular diet (carb control and cardiac restriction)  Disposition: continue current care    Mary Blair MD, PGY-1  Attending physician: Dr. Catalina Arita    Senior Resident Addendum:  I have seen and examined the patient with the intern. I have discussed the case, including pertinent history and exam findings with the intern.  I agree with the assessment, plan and orders as documented above with the following additions:    Patient seen and examined at bedside this morning in no acute distress. No active complaints at this time. He states that his breathing and leg swelling has improved a lot. States that he wants to go home today. Creatinine still elevated at 1.6 today. Blood sugars still uncontrolled overnight, ranging 239-442. Insulin regimen adjusted today. Will continue to monitor. He continues to lose weight, has lost 5 lbs since admission. 1. Acute exacerbation of HFpEF (EF 55-60% in 2021)  2. Prolonged QTc, QTc 515 on admission  3. Elevated troponin 2/2 CKD, chronically elevated, usually at the 40s  4. Hyperglycemia in the setting of uncontrolled IDDM,  on admission  5. RADHA Stage I on CKD Stage 3 (baseline 1.1-1.3) - sCr 1.5 on admission -> 1.6 today  6. Hypokalemia, likely 2/2 diuresis -resolved  7. Hx of sinus node dysfunction s/p pacemaker placement   8. Hx of IDDM - on 60u basaglar and 10u premeals and SS at home  9. Hx of paroxysmal Afib, on metoprolol and eliquis  10. Hx of HLD, on metoprolol  11. Hx of NIKO/OHS, on 4lpm at night; not using CPAP  12. Hx of hypothyroidism. On synthroid; TSH 21.63 this admission     Plan:  · Consulted nephrology. Appreciate inputs. · Consulted CHF nurse. Patient needs to follow up at CHF clinic after discharge  · Continue bumex 2 mg PO daily.  Monitor daily weight and UOP  · Continue Entresto per nephrology recommendation  · Strict I/O  · Monitor BP  · Monitor renal function and electrolytes  · Pulmonology consulted, appreciate recommendations  · Continue CPAP at night or oxygen at night  · Continue Pulmicort, Brovana and Proventil PRN  · Increase lantus to 50u and premeals to 15u and MDSS  · Monitor BG AC/HS  · Hypoglycemia protocol in place  · Continue metoprolol and eliquis  · Continue synthroid 88mcg daily      Yannick Guerrero MD  7/9/2022  1:50 PM

## 2022-07-09 NOTE — PROGRESS NOTES
Date: 7/8/2022    Time: 11:22 PM    Patient Placed On BIPAP/CPAP/ Non-Invasive Ventilation? Yes    If no must comment. Facial area red/color change? No           If YES are Blister/Lesion present? No   If yes must notify nursing staff  BIPAP/CPAP skin barrier?   Yes    Skin barrier type:mepilexlite       Comments:        Shiva Freeman RCP

## 2022-07-09 NOTE — PROGRESS NOTES
Senior Resident Addendum:  I have seen and examined the patient with the intern. I have discussed the case, including pertinent history and exam findings with the intern. I agree with the assessment, plan and orders as documented above with the following additions:    Patient seen and examined at bedside this AM in no acute distress. No complaint at this point. Denies fever, chills, SOB, chest pain, cough, palpitations. No oxygen required overnight. Physical exam showed 1+ pitting edema on left lower extremity. No JVD or crackles on lung auscultation.      1. Acute exacerbation of HFpEF (EF 55-60% in 2021)  2. Prolonged QTc, QTc 515 on admission  3. Elevated troponin 2/2 CKD, chronically elevated, usually at the 40s  4. Hyperglycemia in the setting of uncontrolled IDDM,  on admission  5. RADHA Stage I on CKD Stage 3 (baseline 1.1-1.3) - sCr 1.5 on admission  6. Hypokalemia, likely 2/2 diuresis  7. Hx of sinus node dysfunction s/p pacemaker placement   8. Hx of IDDM - on 60u basaglar and 10u premeals and SS at home  9. Hx of paroxysmal Afib, on metoprolol and eliquis  10. Hx of HLD, on metoprolol  11. Hx of NIKO/OHS, on 4lpm at night; not using CPAP  12. Hx of hypothyroidism. On synthroid; TSH 21.63 this admission     Plan:  · Consulted nephrology. Appreciate inputs. · Consulted CHF nurse. Patient needs to follow up at CHF clinic after discharge  · Increase bumex to 2 mg in AM and 1 mg at night. Monitor daily weight and UOP  · Continue metolazone 2.5mg MWF and aldactone 2.5mg MWF.  Consider hold aldactone tomorrow if renal function continue to worsen  · Strict I/O  · Monitor BP  · Monitor renal function and electrolytes  · CPAP at night  · Continue 45u lantus, 10u premeals and LDSS  · Monitor BG AC/HS  · Hypoglycemia protocol in place  · Continue metoprolol and eliquis  · Continue synthroid 88mcg daily          Lindsey Hewitt MD  7/8/2022  9:13 PM

## 2022-07-09 NOTE — PROGRESS NOTES
[facility]  Internal Medicine Residency / [address]    Attending Physician Statement  I have discussed the case, including pertinent history and exam findings with the resident and the team.  I have seen and examined the patient and the key elements of the encounter have been performed by me. I agree with the assessment, plan and orders as documented by the resident. CHF with preserved EF    with persistent rate controlled AFib  A&O  Sitting at bedside VS stable  BP this AM 96/56  Improving on diuretics  Cr 1.6  BUN 65  On Entresto, Metoprolol and Eliquis   Plan; Continue same            Follow BP and Cr with Nephro    Remainder of medical problems as per resident note.       Isaac Ruiz MD [facility] SPECIALTY [facility]  Internal Medicine Residency Faculty

## 2022-07-09 NOTE — PLAN OF CARE
Problem: Discharge Planning  Goal: Discharge to home or other facility with appropriate resources  Outcome: Progressing     Problem: Safety - Adult  Goal: Free from fall injury  Outcome: Progressing  Flowsheets (Taken 7/9/2022 1112)  Free From Fall Injury: Instruct family/caregiver on patient safety     Problem: Metabolic/Fluid and Electrolytes - Adult  Goal: Electrolytes maintained within normal limits  Outcome: Progressing  Goal: Hemodynamic stability and optimal renal function maintained  Outcome: Progressing  Goal: Glucose maintained within prescribed range  Outcome: Progressing     Problem: Chronic Conditions and Co-morbidities  Goal: Patient's chronic conditions and co-morbidity symptoms are monitored and maintained or improved  Outcome: Progressing     Problem: Cardiovascular - Adult  Goal: Maintains optimal cardiac output and hemodynamic stability  Outcome: Progressing     Problem: Pain  Goal: Verbalizes/displays adequate comfort level or baseline comfort level  Outcome: Progressing

## 2022-07-09 NOTE — PROGRESS NOTES
[facility]  Department of Internal Medicine  Division of Pulmonary, Critical Care and Sleep Medicine  Consult Note      Today's Date: 7/9/2022    Patient Name: Danilo Negro    Date of admission: 7/7/2022 11:25 AM    Patient's age: 69 y.o., 1955    Admission Dx: Acute on chronic congestive heart failure, unspecified heart failure type ([address] [I50.9]    REASON FOR CONSULT:  NIKO    Requesting Physician: Deandre Morris DO    HISTORY OF PRESENT ILLNESS:   The patient is a 69 y.o. male seen for consultation surrounding their Acute on chronic congestive heart failure, unspecified heart failure type ([address] [I50.9]. We had the pleasure of seeing Danilo Negro, in the [address] at [facility] regarding his lung nodules, morbid obesity with restrictive lung (TLC 74%) disease and sleep apnea (not tolerated). HISTORY OF PRESENT ILLNESS:    Danilo Negro is a 69 y.o. male with a past medical history of HTN, HLP, DM, hypothyroid, OA, carpal tunnel S/pa surgery, restrictive lung problems and NIKO intolerant of CPAP therapy. He also has a history of lung nodules that are stable since 2012 with continued surveillance. We reviewed his CT of the chest which demonstrates stable lung nodules since 2012 and we will continue surveillance yearly. We had a long discussion regarding untreated NIKO and not tolerating his CPAP. In 2017, he underwent back surgery which led to prolonged respiratory failure most likely due to upper airway obstruction, volume overload and pain medication. The surgery occurred at [facility] and his wife requested him to be transferred to [facility] where he was able to be extubated and went on to rehabilitation. He is home now but unfortunately the back surgery did not resolve his problems.   He has lost 20 pounds from this event and he continues only to use the oxygen only at night. In 2019, he had a pacer placed by EP for sinus rodolfo dysfunction. Mr. Judi Tavares was seen today for follow up. He has been doing well with minimal shortness of breath with activity. He has no coughing or wheezing. He continues to refuse CPAP therapy but is wearing O2 at 2.5 L nasal/canula at night. He states he is complaint. No chest pain, palpitations or lower extremity edema. No lightheadedness or syncope. He is riding his spider motorcycle. He is using Dulera 200 BID as his insurance will not cover Breo and his lung function was 52 %. We discussed his oxygen concentrators for camping trips. We discussed his weight and BP issues. He is getting his motorcycles fixed and got a new truck He was admitted for urinary issues and feels better. He is seeing a kidney specialist. His neuropathic pain is worsening. Brething is the same.      ALLERGIES:  No Known Allergies    PAST MEDICAL HISTORY:       Diagnosis Date    RADHA (acute kidney injury) ([facility] 3/10/2022    Atrial fibrillation (HCC)     Carpal tunnel syndrome     Encounter regarding vascular access for dialysis for ESRD ([facility] 3/12/2022    Hyperlipidemia     Hypertension     Hypothyroidism     Lung nodule     Nocturnal hypoxemia due to obesity 8/8/2013    Obesity     NIKO (obstructive sleep apnea) 8/8/2013    [facility]    Osteoarthritis     Pinched nerve     Lumbar    Restrictive lung disease secondary to obesity 7/25/2016    Sinus node dysfunction (HCC)     Type II or unspecified type diabetes mellitus without mention of complication, not stated as uncontrolled         MEDICATIONS:   Current Facility-Administered Medications   Medication Dose Route Frequency Provider Last Rate Last Admin    insulin lispro (HUMALOG) injection vial 0-12 Units  0-12 Units SubCUTAneous TID  Leighann Rahman MD   6 Units at 07/09/22 1219    insulin lispro (HUMALOG) injection vial 0-6 Units  0-6 Units SubCUTAneous Nightly Luis Almendarez MD        lidocaine 4 % external patch 1 patch  1 patch TransDERmal Daily Luis Almendarez MD   1 patch at 07/09/22 0834    insulin glargine-yfgn (SEMGLEE-YFGN) injection vial 50 Units  50 Units SubCUTAneous Nightly Cedric Edmondson MD        insulin lispro (HUMALOG) injection vial 15 Units  15 Units SubCUTAneous TID WC Kenya Katz MD   15 Units at 07/09/22 1220    bumetanide (BUMEX) tablet 2 mg  2 mg Oral Daily Jay Lewis MD   2 mg at 07/09/22 0835    [Held by provider] bumetanide (BUMEX) injection 1 mg  1 mg IntraVENous Nightly Jay Lewis MD        sacubitril-valsartan (ENTRESTO) 24-26 MG per tablet 1 tablet  1 tablet Oral BID Lacie Scott MD   1 tablet at 07/09/22 0835    aspirin EC tablet 81 mg  81 mg Oral Daily Cedric Edmondson MD   81 mg at 07/09/22 0835    atorvastatin (LIPITOR) tablet 40 mg  40 mg Oral Nightly Cedric Edmondson MD   40 mg at 07/08/22 2102    Vitamin D (CHOLECALCIFEROL) tablet 1,000 Units  1,000 Units Oral Daily eCdric Edmondson MD   1,000 Units at 07/09/22 5452    apixaban (ELIQUIS) tablet 5 mg  5 mg Oral BID Michelle Wilson MD   5 mg at 07/09/22 0835    levothyroxine (SYNTHROID) tablet 88 mcg  88 mcg Oral Daily Kenya Katz MD   88 mcg at 07/09/22 1512    metoprolol succinate (TOPROL XL) extended release tablet 25 mg  25 mg Oral Daily Cedric Edmondson MD   25 mg at 07/09/22 0835    sodium chloride flush 0.9 % injection 5-40 mL  5-40 mL IntraVENous 2 times per day Cedric Edmondson MD   10 mL at 07/09/22 0835    sodium chloride flush 0.9 % injection 5-40 mL  5-40 mL IntraVENous PRN Cedric Edmondson MD        0.9 % sodium chloride infusion   IntraVENous PRN Cedric Edmondson MD        polyethylene glycol [facility]) packet 17 g  17 g Oral Daily PRN Cedric Edmondson MD        acetaminophen (TYLENOL) tablet 650 mg  650 mg Oral Q6H PRN Jesus Blake MD   650 mg at 07/09/22 0027    Or    acetaminophen (TYLENOL) suppository 650 mg  650 mg Rectal Q6H PRN Jesus Blake MD        glucose chewable tablet 16 g  4 tablet Oral PRN Jesus Blake MD        dextrose 50 % IV solution  25 mL IntraVENous PRN Jesus Blake MD        Or    dextrose 50 % IV solution  50 mL IntraVENous PRN Jesus Blake MD        glucagon (rDNA) injection 1 mg  1 mg IntraMUSCular PRN Jesus Blake MD        dextrose 5 % solution  100 mL/hr IntraVENous PRN Jesus Blake MD        budesonide (PULMICORT) nebulizer suspension 500 mcg  0.5 mg Nebulization BID Kenya Worley MD   500 mcg at 07/09/22 1325    And    Arformoterol Tartrate (BROVANA) nebulizer solution 15 mcg  15 mcg Nebulization BID Jesus Blake MD   15 mcg at 07/09/22 1325    albuterol (PROVENTIL) nebulizer solution 2.5 mg  2.5 mg Nebulization Q6H PRN Jesus Blake MD        trimethobenzamide Vallarie Viry) injection 200 mg  200 mg IntraMUSCular Q6H PRN Jesus Blake MD           SOCIAL AND OCCUPATIONAL HEALTH:  Quit smoking about 5 years ago. Smoker 1 - 3 ppd for 40 years. There is no history of TB or TB exposure. There is no asbestos or silica dust exposure. The patient reports no coal, foundry, quarry or [employer] exposure. There is no recent travel history noted. The patient denies a history of recreational or IV drug use. No hot tub exposure. The patient has no pets. Hobbies include riding his motorcycle. The patient denies excessive alcohol intake. The patient reports no birds or exotic animals. He is , has 4 stepchildren. He is currently on disability but was a  for 30 years. He rides a 'spider\" motorcycle.  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 Skin:  Patient denies history of changes in pigmentation, eruptions or pruritus. Eyes: Patient denies any history of color blindness, photophobia, diplopia, inflammation, cataracts or glaucoma. Ears: Patient denies history of deafness, tinnitus, pain, discharge or recurrent infections. Nose/Throat: Patient admits to drainage. Patient denies history of soreness of mouth or tongue. Patient denies history of hoarseness, voice changes, sore throats or recurrent tonsillitis. Lymphatic:  Patient denies history of enlargement, inflammation, pain or suppuration. He admits to history of lymphoma. Cardiovascular:  Patient admits to history of palpations, & chest pain. He admits to orthopnea & cold extremities. He admits to edema. Biventricular pacemaker for sinus node dysfunction.    Pulmonary:  Patient denies wheezing, dyspnea, exertional dyspnea, nocturnal dyspnea. He denies cough. He denies hemoptysis or pleurisy. He complains of sleep disorder. He reports symptoms of sleep apnea. Has nocturnal hypoxemia with O2 at 2.5 L N/C. Gastrointestinal: Patient denies changes in appetite. He denies dysphagia, odynophagia or abdominal pain. He denies hematochezia, melena, bowel habit changes or hemorrhoids. Allergy/Immunology: The patient denies itching, hives, or angioedema. The patient denies a problem with seasonal/environmental allergies. Genitourinary:  The patient denies a history of stones or UTI. Vascular: No history of peripheral vascular disease, carotid occlusive disease or aneurysms. Musculoskeletal: The patient reports a history of arthritis & joint pains. He complains of loss of strength. He severe bilateral upper shoulder discomfort   and previous laminectomy with ongoing persistent hip pain and inability to exercise. OA noted. Complains of right thumb discomfort.   Breasts: He denies history of masses, lumps, pain, or nipple equal bilaterally. Abdomen: Obese, rounded and soft without organomegaly. No rebound, rigidity. Lymphatic: No lymphadenopathy. Musculoskeletal: Ambulates without assistance. Flattened lordotic curvature of the spine. No involuntary movements. Extremities: Tremor noted bilateral hands. Trace lower extremity edema. No ulcerations, tenderness. Noted varicosities & venous statis/ erythema. Sensory function sensation is very diminished. Skin:  Warm and dry. Poor skin color, turgor. No bruises or skin rashes. Old surgical scars are noted. Back surgery scar. Neurological/Psychiatric: General behavior is normal. Memory is slightly impair on long term recall. Emotional status is normal.  Cranial nerves II-XII are intact. DATA:   The data collected below information that was obtained, reviewed, analyzed and interpreted today. Spirometry was compared to previous test if available and demonstrates an FVC of 2.34 liters which is 62 % of predicted with an FEV1 of 2.04 liters which is 71 % of predicted. FEV1/FVC ratio is 87 %. Mid expiratory flow rates are 95 % of predicted. Maximum voluntary ventilation is 75 liters per minute or 58 % of predicted. Flow volume loop shows no signs of intrathoracic or extrathoracic process. Impression: Moderate Restrictive Pathology    Static lung volumes (2015)  demonstrate an ERV of 0.16 liters which is 15% predicted, TGV of 2.34 liters which is 71% predicted, RV which is 2.18 liters which is 103% predicted, TLC of 4.72 liters which is 74% predicted. RV/TLC ratio is 140% predicted. DLCO is 95% predicted with a DL/VA of 94% predicted when corrected for alveolar ventilation. ECHOCARDIOGRAM: (2021):  Left ventricle is normal in size . Micro-bubble contrast injected to enhance left ventricular visualization. No regional wall motion abnormalities seen. Normal left ventricular ejection fraction. Ejection fraction is visually estimated at 55-60%.  Indeterminate diastolic function. The left atrium is mildly dilated. Mildly dilated right ventricle. CT scan Right ventricle global systolic function is low normal.  The aortic valve appears moderately sclerotic.     CT SCAN CHEST 9/2021: Intervally new small to moderate left and trace right pleural effusions. Mild thickening of secondary interlobular septa with ground-glass attenuation at some levels, compatible with an element of interstitial pulmonary edema. Patchy mild subsegmental atelectasis bilaterally, worse within the basal left lower lobe and inferior lingula. Multiple essentially stable pulmonary nodules bilaterally. Intervally new small volume ascites and flank edema. Mild upper abdominal lymphadenopathy. CT SCAN CHEST (2/2019):  LUNGS: The tiny lung nodules which were previously present are stable. No pleural effusion or pneumothorax is seen. HEART: Unremarkable. AORTA: The aorta appears to be unremarkable. MEDIASTINUM: The mediastinum nonspecific adenopathy noted most pronounced in the sub-subcarinal space. UPPER ABDOMEN: Unremarkable. OTHER:Unremarkable    CT SCAN CHEST (12/2017): Airways appear patent. A few chronic bands of atelectasis or fibrosis in each lower lobe. Solid noncalcified subpleural nodule posterior lateral aspect right upper lobe at the level the yecenia measures about 3.8 mm whereas previously it measured about 3.1 mm. Other smaller peripheral nodular densities in the right upper lobe are stable. Peripheral solid nodule laterally left upper lobe at the level the yecenia measuring about 3.7 mm is unchanged. CT SCAN CHEST (7/2016): We reviewed the films during the inter-disciplinary rounds/conference, which showed mild cardiomegaly with mediastinal lipomatosis and nonspecific lymph nodes in the mediastinum. 2. 4 mm noncalcified nodules in the upper lobe bilaterally which are unchanged since 2012. CT SCAN CHEST (7/2015): IMPRESSION: 1.  Indeterminate pulmonary nodules are stable back to August 22,2012. No evidence for new pulmonary nodule, mass, or lymphadenopathy. 2. No acute pleural-parenchymal disease. 3. Borderline heart size. SLEEP STUDY: (2013)  IMPRESSION: 1. Known sleep apnea. 2. Nocturnal hypoxemia. 3. 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 4. No cardiac dysrhythmia. IMPRESSION:    Denita Cárdenas is a 69 y.o. male who is morbidly obese with restrictive lung disease secondary to his obesity along with incidental pulmonary nodules in a previous smoker and lastly, obstructive sleep apnea, intolerant to CPAP therapy on oxygen at 2.5 liters. With this in mind, we would like to proceed with the following;                  PLAN:      . [id number] Latoya Zhao to discharge  No change in CPAP or inhalers            He is feeling ok, however he states he was in the hospital again with urinary issues and needed a catheter. Minta Maco was adjusted. We are applying for the [employer] oxygen concentrator. He is riding his [employer] motorcycle. As for his restrictive lung disease, his spirometry is improved after significant weight loss and diuretics. He is intolerant of the CPAP mask and is wearing his O2 at night and he is on 2.5 liter nasal canula. ([employer]) We had a long discussion regarding untreated NIKO and nocturnal hypoxemia the risks. He will follow up in 6 months and on an as needed basis. We will continue with the ICS/LABA for some chronic bronchitis symptoms. Have added Spiriva daily. Because of his frequent travel which includes: camping, overnight trips out of state to visit grandchildren - we discussed a portable oxygen concentrator to help maintain compliance for his chronic respiratory failure. As for the lung nodules given the following since 2015 and they remained stable. Vaccines are current. We hope this updates you on our evaluation and clinical thinking. Thank you for intrusting us to participate in Denita Cárdenas care.   If you have any questions, please don't hesitate to call us at the Pulmonary [facility]. Sincerely,    Orly Gonzalez DO, MPH, Bari Brunson, FACP  Director, Karen Valerio  Professor of Internal Medicine  [address] [facility]  [address] [place] [address]

## 2022-07-09 NOTE — PROGRESS NOTES
Department of Internal Medicine  Nephrology Attending Consult Note      Events reviewed. SUBJECTIVE: We are following Mr. Rocha for RADHA. Reports no complaints. PHYSICAL EXAM:      Vitals:    VITALS:  BP (!) 96/56   Pulse 65   Temp 98 °F (36.7 °C) (Oral)   Resp 21   Wt 222 lb 14.2 oz (101.1 kg)   SpO2 96%   BMI 34.91 kg/m²   24HR INTAKE/OUTPUT:      Intake/Output Summary (Last 24 hours) at 7/9/2022 1040  Last data filed at 7/9/2022 4151  Gross per 24 hour   Intake 200 ml   Output 1885 ml   Net -1685 ml     Constitutional:  Alert, elderly, well developed, no acute distress  HEENT:  PERRLA, normocephalic, atraumtic  Respiratory:  CTAB except bibasilar rales  Cardiovascular/Edema:  S1/S2, RRR  Gastrointestinal:  Soft, rounded, non-tender, obese  Neurologic:  AxOx3  Skin:  Warm, dry, dry mucous membranes   Other:  Prince- with urine noted, ++ edema    Scheduled Meds:   insulin lispro  0-12 Units SubCUTAneous TID WC    insulin lispro  0-6 Units SubCUTAneous Nightly    lidocaine  1 patch TransDERmal Daily    insulin glargine  50 Units SubCUTAneous Nightly    insulin lispro  15 Units SubCUTAneous TID WC    bumetanide  2 mg Oral Daily    [Held by provider] bumetanide  1 mg IntraVENous Nightly    sacubitril-valsartan  1 tablet Oral BID    aspirin  81 mg Oral Daily    atorvastatin  40 mg Oral Nightly    Vitamin D  1,000 Units Oral Daily    apixaban  5 mg Oral BID    levothyroxine  88 mcg Oral Daily    metoprolol succinate  25 mg Oral Daily    sodium chloride flush  5-40 mL IntraVENous 2 times per day    budesonide  0.5 mg Nebulization BID    And    Arformoterol Tartrate  15 mcg Nebulization BID     Continuous Infusions:   sodium chloride      dextrose       PRN Meds:.sodium chloride flush, sodium chloride, polyethylene glycol, acetaminophen **OR** acetaminophen, glucose, dextrose **OR** dextrose, glucagon (rDNA), dextrose, albuterol, trimethobenzamide      DATA:    CBC:   Lab Results   Component Value Date/Time    WBC 7.1 07/09/2022 03:27 AM    RBC 4.00 07/09/2022 03:27 AM    HGB 12.0 07/09/2022 03:27 AM    HCT 35.7 07/09/2022 03:27 AM    MCV 89.3 07/09/2022 03:27 AM    MCH 30.0 07/09/2022 03:27 AM    MCHC 33.6 07/09/2022 03:27 AM    RDW 16.6 07/09/2022 03:27 AM     07/09/2022 03:27 AM    MPV 9.9 07/09/2022 03:27 AM     CMP:    Lab Results   Component Value Date/Time     07/09/2022 03:27 AM    K 3.9 07/09/2022 03:27 AM    K 5.0 03/10/2022 04:28 PM    CL 89 07/09/2022 03:27 AM    CO2 27 07/09/2022 03:27 AM    BUN 65 07/09/2022 03:27 AM    CREATININE 1.6 07/09/2022 03:27 AM    GFRAA 52 07/09/2022 03:27 AM    LABGLOM 43 07/09/2022 03:27 AM    GLUCOSE 239 07/09/2022 03:27 AM    GLUCOSE 133 04/18/2012 08:43 AM    PROT 8.7 07/07/2022 11:27 AM    LABALBU 4.0 07/07/2022 11:27 AM    LABALBU 4.4 10/25/2011 09:50 AM    CALCIUM 10.1 07/09/2022 03:27 AM    BILITOT 1.0 07/07/2022 11:27 AM    ALKPHOS 129 07/07/2022 11:27 AM    AST 39 07/07/2022 11:27 AM    ALT 28 07/07/2022 11:27 AM     Magnesium:    Lab Results   Component Value Date/Time    MG 1.6 07/09/2022 03:27 AM     Phosphorus:    Lab Results   Component Value Date/Time    PHOS 4.2 07/09/2022 03:27 AM     Radiology review:    Chest x-ray July 7, 2022   Cardiomegaly and pulmonary vascular congestion/early CHF.             BRIEF SUMMARY OF INITIAL CONSULT:    Briefly Mr. Rocha is a 64-year-old  man with history of stage III CKD, probably due to nephrosclerosis and previous episode of ischemic ATN for which he required temporary HD in May 2017, with recurrent episode of ischemic ATN on March 2022 for which she once again required hemodialysis x2, with fair recovery of renal function, baseline creatinine 1.4-1.5 mg/dL, HTN, DM Type II, atrial fibrillation on apixaban, HFpEF 55-60%, NIKO, pacemaker placement, hypothyroidism, hyperlipidemia, status post back surgery x2, who was admitted on July 7, 2022 after he presented to the ER reporting shortness of breath and increased lower extremity edema. His proBNP on admission was 1512. On admission his creatinine level was 1.5 mg/dL) increased to 1.7 mg/dl, reason for this consultation. Prior to admission his medications included empagliflozin. IMPRESSION/RECOMMENDATIONS:      1. RADHA on CKD, probably hemodynamically mediated due to decompensated heart failure, cardiorenal syndrome. Renal function stable. We will continue to monitor. 2. CKD stage III, without proteinuria, 2/2 nephrosclerosis and previous episodes of ischemic ATN, baseline creatinine 1.4-1.5 mg/dl  3. HTN, on metoprolol  4. HFpEF 55-60%, proBNP 1512, on Bumex  -------------------------------------------------------------  5. Atrial fibrillation, on apixaban  6. Type II DM, on insulin    7. NIKO  8. Hypothyroidism, on levothyroxine  9.  Hyperlipidemia, on atorvastatin    Plan:    · Discontinue Entresto  · Continue to monitor renal function, BMP at 4 PM  · Continue Bumex 2 mg p.o. daily

## 2022-07-10 LAB
ANION GAP SERPL CALCULATED.3IONS-SCNC: 16 MMOL/L (ref 7–16)
ANION GAP SERPL CALCULATED.3IONS-SCNC: 18 MMOL/L (ref 7–16)
BASOPHILS ABSOLUTE: 0.11 E9/L (ref 0–0.2)
BASOPHILS RELATIVE PERCENT: 1.1 % (ref 0–2)
BUN BLDV-MCNC: 82 MG/DL (ref 6–23)
BUN BLDV-MCNC: 88 MG/DL (ref 6–23)
CALCIUM SERPL-MCNC: 9.6 MG/DL (ref 8.6–10.2)
CALCIUM SERPL-MCNC: 9.9 MG/DL (ref 8.6–10.2)
CHLORIDE BLD-SCNC: 85 MMOL/L (ref 98–107)
CHLORIDE BLD-SCNC: 87 MMOL/L (ref 98–107)
CHLORIDE URINE RANDOM: <20 MMOL/L
CO2: 25 MMOL/L (ref 22–29)
CO2: 27 MMOL/L (ref 22–29)
CREAT SERPL-MCNC: 2.8 MG/DL (ref 0.7–1.2)
CREAT SERPL-MCNC: 3.2 MG/DL (ref 0.7–1.2)
CREATININE URINE: 284 MG/DL (ref 40–278)
EOSINOPHILS ABSOLUTE: 0.52 E9/L (ref 0.05–0.5)
EOSINOPHILS RELATIVE PERCENT: 5.2 % (ref 0–6)
GFR AFRICAN AMERICAN: 23
GFR AFRICAN AMERICAN: 27
GFR NON-AFRICAN AMERICAN: 19 ML/MIN/1.73
GFR NON-AFRICAN AMERICAN: 23 ML/MIN/1.73
GLUCOSE BLD-MCNC: 210 MG/DL (ref 74–99)
GLUCOSE BLD-MCNC: 260 MG/DL (ref 74–99)
HCT VFR BLD CALC: 34.3 % (ref 37–54)
HEMOGLOBIN: 11.6 G/DL (ref 12.5–16.5)
IMMATURE GRANULOCYTES #: 0.18 E9/L
IMMATURE GRANULOCYTES %: 1.8 % (ref 0–5)
LYMPHOCYTES ABSOLUTE: 1.86 E9/L (ref 1.5–4)
LYMPHOCYTES RELATIVE PERCENT: 18.6 % (ref 20–42)
MAGNESIUM: 2.2 MG/DL (ref 1.6–2.6)
MCH RBC QN AUTO: 30.4 PG (ref 26–35)
MCHC RBC AUTO-ENTMCNC: 33.8 % (ref 32–34.5)
MCV RBC AUTO: 89.8 FL (ref 80–99.9)
METER GLUCOSE: 222 MG/DL (ref 74–99)
METER GLUCOSE: 259 MG/DL (ref 74–99)
METER GLUCOSE: 332 MG/DL (ref 74–99)
MONOCYTES ABSOLUTE: 1.1 E9/L (ref 0.1–0.95)
MONOCYTES RELATIVE PERCENT: 11 % (ref 2–12)
NEUTROPHILS ABSOLUTE: 6.23 E9/L (ref 1.8–7.3)
NEUTROPHILS RELATIVE PERCENT: 62.3 % (ref 43–80)
PDW BLD-RTO: 17.1 FL (ref 11.5–15)
PHOSPHORUS: 6.7 MG/DL (ref 2.5–4.5)
PLATELET # BLD: 231 E9/L (ref 130–450)
PMV BLD AUTO: 10 FL (ref 7–12)
POTASSIUM SERPL-SCNC: 3.9 MMOL/L (ref 3.5–5)
POTASSIUM SERPL-SCNC: 4.1 MMOL/L (ref 3.5–5)
POTASSIUM, UR: 47.6 MMOL/L
RBC # BLD: 3.82 E12/L (ref 3.8–5.8)
SODIUM BLD-SCNC: 128 MMOL/L (ref 132–146)
SODIUM BLD-SCNC: 130 MMOL/L (ref 132–146)
SODIUM URINE: 37 MMOL/L
UREA NITROGEN, UR: 231 MG/DL (ref 800–1666)
WBC # BLD: 10 E9/L (ref 4.5–11.5)

## 2022-07-10 PROCEDURE — 6360000002 HC RX W HCPCS

## 2022-07-10 PROCEDURE — 82570 ASSAY OF URINE CREATININE: CPT

## 2022-07-10 PROCEDURE — 6370000000 HC RX 637 (ALT 250 FOR IP): Performed by: INTERNAL MEDICINE

## 2022-07-10 PROCEDURE — 2700000000 HC OXYGEN THERAPY PER DAY

## 2022-07-10 PROCEDURE — 84133 ASSAY OF URINE POTASSIUM: CPT

## 2022-07-10 PROCEDURE — 6370000000 HC RX 637 (ALT 250 FOR IP)

## 2022-07-10 PROCEDURE — 84300 ASSAY OF URINE SODIUM: CPT

## 2022-07-10 PROCEDURE — 83735 ASSAY OF MAGNESIUM: CPT

## 2022-07-10 PROCEDURE — 82962 GLUCOSE BLOOD TEST: CPT

## 2022-07-10 PROCEDURE — 94640 AIRWAY INHALATION TREATMENT: CPT

## 2022-07-10 PROCEDURE — 82436 ASSAY OF URINE CHLORIDE: CPT

## 2022-07-10 PROCEDURE — 80048 BASIC METABOLIC PNL TOTAL CA: CPT

## 2022-07-10 PROCEDURE — 6370000000 HC RX 637 (ALT 250 FOR IP): Performed by: STUDENT IN AN ORGANIZED HEALTH CARE EDUCATION/TRAINING PROGRAM

## 2022-07-10 PROCEDURE — 99232 SBSQ HOSP IP/OBS MODERATE 35: CPT | Performed by: INTERNAL MEDICINE

## 2022-07-10 PROCEDURE — S5553 INSULIN LONG ACTING 5 U: HCPCS

## 2022-07-10 PROCEDURE — 84540 ASSAY OF URINE/UREA-N: CPT

## 2022-07-10 PROCEDURE — 2060000000 HC ICU INTERMEDIATE R&B

## 2022-07-10 PROCEDURE — 36415 COLL VENOUS BLD VENIPUNCTURE: CPT

## 2022-07-10 PROCEDURE — 99231 SBSQ HOSP IP/OBS SF/LOW 25: CPT | Performed by: INTERNAL MEDICINE

## 2022-07-10 PROCEDURE — 94660 CPAP INITIATION&MGMT: CPT

## 2022-07-10 PROCEDURE — 2580000003 HC RX 258

## 2022-07-10 PROCEDURE — 84100 ASSAY OF PHOSPHORUS: CPT

## 2022-07-10 PROCEDURE — 85025 COMPLETE CBC W/AUTO DIFF WBC: CPT

## 2022-07-10 RX ORDER — INSULIN GLARGINE-YFGN 100 [IU]/ML
55 INJECTION, SOLUTION SUBCUTANEOUS NIGHTLY
Status: DISCONTINUED | OUTPATIENT
Start: 2022-07-10 | End: 2022-07-11

## 2022-07-10 RX ORDER — INSULIN LISPRO 100 [IU]/ML
20 INJECTION, SOLUTION INTRAVENOUS; SUBCUTANEOUS
Status: DISCONTINUED | OUTPATIENT
Start: 2022-07-10 | End: 2022-07-11

## 2022-07-10 RX ORDER — METOPROLOL SUCCINATE 25 MG/1
25 TABLET, EXTENDED RELEASE ORAL DAILY
Status: DISCONTINUED | OUTPATIENT
Start: 2022-07-11 | End: 2022-07-13 | Stop reason: HOSPADM

## 2022-07-10 RX ORDER — METOPROLOL SUCCINATE 25 MG/1
25 TABLET, EXTENDED RELEASE ORAL DAILY
Status: DISCONTINUED | OUTPATIENT
Start: 2022-07-11 | End: 2022-07-10

## 2022-07-10 RX ADMIN — INSULIN LISPRO 2 UNITS: 100 INJECTION, SOLUTION INTRAVENOUS; SUBCUTANEOUS at 21:24

## 2022-07-10 RX ADMIN — INSULIN LISPRO 15 UNITS: 100 INJECTION, SOLUTION INTRAVENOUS; SUBCUTANEOUS at 08:37

## 2022-07-10 RX ADMIN — METOPROLOL SUCCINATE 25 MG: 25 TABLET, FILM COATED, EXTENDED RELEASE ORAL at 08:36

## 2022-07-10 RX ADMIN — ACETAMINOPHEN 325MG 650 MG: 325 TABLET ORAL at 05:32

## 2022-07-10 RX ADMIN — APIXABAN 5 MG: 5 TABLET, FILM COATED ORAL at 21:23

## 2022-07-10 RX ADMIN — INSULIN LISPRO 8 UNITS: 100 INJECTION, SOLUTION INTRAVENOUS; SUBCUTANEOUS at 11:53

## 2022-07-10 RX ADMIN — Medication 1000 UNITS: at 08:36

## 2022-07-10 RX ADMIN — INSULIN LISPRO 6 UNITS: 100 INJECTION, SOLUTION INTRAVENOUS; SUBCUTANEOUS at 08:41

## 2022-07-10 RX ADMIN — Medication 5 MG: at 21:23

## 2022-07-10 RX ADMIN — ATORVASTATIN CALCIUM 40 MG: 40 TABLET, FILM COATED ORAL at 21:23

## 2022-07-10 RX ADMIN — BUDESONIDE 500 MCG: 0.5 SUSPENSION RESPIRATORY (INHALATION) at 13:59

## 2022-07-10 RX ADMIN — INSULIN LISPRO 20 UNITS: 100 INJECTION, SOLUTION INTRAVENOUS; SUBCUTANEOUS at 16:18

## 2022-07-10 RX ADMIN — GABAPENTIN 300 MG: 300 CAPSULE ORAL at 08:35

## 2022-07-10 RX ADMIN — APIXABAN 5 MG: 5 TABLET, FILM COATED ORAL at 08:36

## 2022-07-10 RX ADMIN — INSULIN LISPRO 20 UNITS: 100 INJECTION, SOLUTION INTRAVENOUS; SUBCUTANEOUS at 11:52

## 2022-07-10 RX ADMIN — ARFORMOTEROL TARTRATE 15 MCG: 15 SOLUTION RESPIRATORY (INHALATION) at 13:59

## 2022-07-10 RX ADMIN — SODIUM CHLORIDE, PRESERVATIVE FREE 10 ML: 5 INJECTION INTRAVENOUS at 21:24

## 2022-07-10 RX ADMIN — LEVOTHYROXINE SODIUM 88 MCG: 0.09 TABLET ORAL at 05:32

## 2022-07-10 RX ADMIN — INSULIN GLARGINE-YFGN 55 UNITS: 100 INJECTION, SOLUTION SUBCUTANEOUS at 21:24

## 2022-07-10 RX ADMIN — BUMETANIDE 2 MG: 1 TABLET ORAL at 08:35

## 2022-07-10 RX ADMIN — INSULIN LISPRO 6 UNITS: 100 INJECTION, SOLUTION INTRAVENOUS; SUBCUTANEOUS at 16:19

## 2022-07-10 RX ADMIN — GABAPENTIN 300 MG: 300 CAPSULE ORAL at 21:23

## 2022-07-10 RX ADMIN — ASPIRIN 81 MG: 81 TABLET, COATED ORAL at 08:36

## 2022-07-10 RX ADMIN — SODIUM CHLORIDE, PRESERVATIVE FREE 10 ML: 5 INJECTION INTRAVENOUS at 11:55

## 2022-07-10 ASSESSMENT — PAIN SCALES - GENERAL: PAINLEVEL_OUTOF10: 0

## 2022-07-10 NOTE — PROGRESS NOTES
[address] [facility]  Internal Medicine Residency / [address]    Attending Physician Statement  I have discussed the case, including pertinent history and exam findings with the resident and the team.  I have seen and examined the patient and the key elements of the encounter have been performed by me. I agree with the assessment, plan and orders as documented by the resident. SOB and leg edema resolved  Sitting up at bedside and A&O  Mobile   Insulin dose reviewed   On Bumex 2 mg a day and Cr 2.8  BMP at 4 PM today  PVRV <50cc  AFib , rate control and off Entresto, on Eliquis and Metoprolol   Plan: Continue per Nephrology  Remainder of medical problems as per resident note.       Rama Alvares MD [facility]  Internal Medicine Residency Faculty

## 2022-07-10 NOTE — PROGRESS NOTES
Department of Internal Medicine  Nephrology Attending Consult Note      Events reviewed. SUBJECTIVE: We are following Mr. Rocha for RADHA. Reports no complaints. PHYSICAL EXAM:      Vitals:    VITALS:  BP (!) 99/57   Pulse 61   Temp 97.5 °F (36.4 °C) (Oral)   Resp 18   Wt 222 lb 14.2 oz (101.1 kg)   SpO2 95%   BMI 34.91 kg/m²   24HR INTAKE/OUTPUT:      Intake/Output Summary (Last 24 hours) at 7/10/2022 1017  Last data filed at 7/9/2022 2114  Gross per 24 hour   Intake --   Output 200 ml   Net -200 ml     Constitutional:  Alert, elderly, well developed, no acute distress  HEENT:  PERRLA, normocephalic, atraumtic  Respiratory:  CTAB except bibasilar rales  Cardiovascular/Edema:  S1/S2, RRR  Gastrointestinal:  Soft, rounded, non-tender, obese  Neurologic:  AxOx3  Skin:  Warm, dry, dry mucous membranes   Other:  Prince- with urine noted, ++ edema    Scheduled Meds:   [START ON 7/11/2022] metoprolol succinate  25 mg Oral Daily    insulin glargine  55 Units SubCUTAneous Nightly    insulin lispro  20 Units SubCUTAneous TID WC    insulin lispro  0-12 Units SubCUTAneous TID WC    insulin lispro  0-6 Units SubCUTAneous Nightly    lidocaine  1 patch TransDERmal Daily    gabapentin  300 mg Oral BID    melatonin  5 mg Oral Nightly    bumetanide  2 mg Oral Daily    [Held by provider] bumetanide  1 mg IntraVENous Nightly    [Held by provider] sacubitril-valsartan  1 tablet Oral BID    aspirin  81 mg Oral Daily    atorvastatin  40 mg Oral Nightly    Vitamin D  1,000 Units Oral Daily    apixaban  5 mg Oral BID    levothyroxine  88 mcg Oral Daily    sodium chloride flush  5-40 mL IntraVENous 2 times per day    budesonide  0.5 mg Nebulization BID    And    Arformoterol Tartrate  15 mcg Nebulization BID     Continuous Infusions:   sodium chloride      dextrose       PRN Meds:.sodium chloride flush, sodium chloride, polyethylene glycol, acetaminophen **OR** acetaminophen, glucose, dextrose **OR** dextrose, glucagon (rDNA), dextrose, albuterol, trimethobenzamide      DATA:    CBC:   Lab Results   Component Value Date/Time    WBC 10.0 07/10/2022 05:07 AM    RBC 3.82 07/10/2022 05:07 AM    HGB 11.6 07/10/2022 05:07 AM    HCT 34.3 07/10/2022 05:07 AM    MCV 89.8 07/10/2022 05:07 AM    MCH 30.4 07/10/2022 05:07 AM    MCHC 33.8 07/10/2022 05:07 AM    RDW 17.1 07/10/2022 05:07 AM     07/10/2022 05:07 AM    MPV 10.0 07/10/2022 05:07 AM     CMP:    Lab Results   Component Value Date/Time     07/10/2022 05:06 AM    K 3.9 07/10/2022 05:06 AM    K 5.0 03/10/2022 04:28 PM    CL 87 07/10/2022 05:06 AM    CO2 25 07/10/2022 05:06 AM    BUN 82 07/10/2022 05:06 AM    CREATININE 2.8 07/10/2022 05:06 AM    GFRAA 27 07/10/2022 05:06 AM    LABGLOM 23 07/10/2022 05:06 AM    GLUCOSE 210 07/10/2022 05:06 AM    GLUCOSE 133 04/18/2012 08:43 AM    PROT 8.7 07/07/2022 11:27 AM    LABALBU 4.0 07/07/2022 11:27 AM    LABALBU 4.4 10/25/2011 09:50 AM    CALCIUM 9.6 07/10/2022 05:06 AM    BILITOT 1.0 07/07/2022 11:27 AM    ALKPHOS 129 07/07/2022 11:27 AM    AST 39 07/07/2022 11:27 AM    ALT 28 07/07/2022 11:27 AM     Magnesium:    Lab Results   Component Value Date/Time    MG 2.2 07/10/2022 05:06 AM     Phosphorus:    Lab Results   Component Value Date/Time    PHOS 6.7 07/10/2022 05:06 AM     Radiology review:    Chest x-ray July 7, 2022   Cardiomegaly and pulmonary vascular congestion/early CHF.             BRIEF SUMMARY OF INITIAL CONSULT:    Briefly Mr. Rocha is a 68-year-old  man with history of stage III CKD, probably due to nephrosclerosis and previous episode of ischemic ATN for which he required temporary HD in May 2017, with recurrent episode of ischemic ATN on March 2022 for which she once again required hemodialysis x2, with fair recovery of renal function, baseline creatinine 1.4-1.5 mg/dL, HTN, DM Type II, atrial fibrillation on apixaban, HFpEF 55-60%, NIKO, pacemaker placement, hypothyroidism, hyperlipidemia, status post back surgery x2, who was admitted on July 7, 2022 after he presented to the ER reporting shortness of breath and increased lower extremity edema. His proBNP on admission was 1512. On admission his creatinine level was 1.5 mg/dL) increased to 1.7 mg/dl, reason for this consultation. Prior to admission his medications included empagliflozin. IMPRESSION/RECOMMENDATIONS:      1. RADHA on CKD, probably hemodynamically mediated due to relative hypotension in the setting of ARB administration (sacubitril-valsartan). Significant increase of creatinine level has occurred last 24 hours. Rule out obstruction    2. CKD stage III, without proteinuria, 2/2 nephrosclerosis and previous episodes of ischemic ATN, baseline creatinine 1.4-1.5 mg/dl  3. HTN, on metoprolol  4. HFpEF 55-60%, proBNP 1512, on Bumex  -------------------------------------------------------------  5. Atrial fibrillation, on apixaban  6. Type II DM, on insulin    7. NIKO  8. Hypothyroidism, on levothyroxine  9.  Hyperlipidemia, on atorvastatin    Plan:    · Continue to monitor renal function, BMP at 4 PM  · Continue Bumex 2 mg p.o. daily  · Obtain bladder scan  · Avoid hypotension

## 2022-07-10 NOTE — PROGRESS NOTES
[facility]  Department of Internal Medicine  Division of Pulmonary, Critical Care and Sleep Medicine  Progress Note      Today's Date: 7/10/2022    Patient Name: Lynne Hutson    Date of admission: 2022 11:25 AM    Patient's age: 69 y.o., 1955    Admission Dx: Acute on chronic congestive heart failure, unspecified heart failure type ([address] [I50.9]    REASON FOR CONSULT:  NIKO    Requesting Physician: Roma Mendoza., DO  SUBJECTIVE:    Cr noted  No c/o        SOCIAL AND OCCUPATIONAL HEALTH:  Quit smoking about 5 years ago. Smoker 1 - 3 ppd for 40 years. There is no history of TB or TB exposure. There is no asbestos or silica dust exposure. The patient reports no coal, foundry, quarry or [employer] exposure. There is no recent travel history noted. The patient denies a history of recreational or IV drug use. No hot tub exposure. The patient has no pets. Hobbies include riding his motorcycle. The patient denies excessive alcohol intake. The patient reports no birds or exotic animals. He is , has 4 stepchildren. He is currently on disability but was a  for 30 years. He rides a 'spider\" motorcycle.  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 Pinched nerve in back    BACK SURGERY 2017    COLONOSCOPY  2007    multiple colon polyps    COLONOSCOPY  2012    COLONOSCOPY    COLONOSCOPY  2022    polyp; diverticula--sarah    COLONOSCOPY N/A 2022    COLONOSCOPY POLYPECTOMY HOT BIOPSY (Snare) performed by Phillip Olguin MD at [address] [facility] [address]      lennie cataracts implant    HERNIA REPAIR      umbilical    PACEMAKER PLACEMENT  10/03/2017    [employer]tronic dual chamber    Dr. Tevin Westfall Right        FAMILY HISTORY: A review of medical events in the patient's family , including disease which may be hereditary or place the patient at risk were reviewed. Family History   Problem Relation Age of Onset    Cancer Mother         skin    Heart Disease Mother     High Blood Pressure Father     Amyotrophic lateral sclerosis Father     Cancer Brother     High Blood Pressure Brother     Diabetes Brother       Family Status   Relation Name Status    Mother     Dylon Lomelir Father      Sister malka     Sister sarah Alive    Sister pat Alive    Sister Lisbet Zeng    Brother lisa     Brother lucrecia     Brother mars     Brother renay         PHYSICAL EXAMINATION:   Vitals:    22 1523 22 2114 07/10/22 0815 07/10/22 1359   BP: 98/84 (!) 91/52 (!) 99/57    Pulse: 96 83 61 70   Resp: 24 20 18 24   Temp: 97.6 °F (36.4 °C) 97.5 °F (36.4 °C) 97.5 °F (36.4 °C)    TempSrc: Oral Oral Oral    SpO2: 93% 92% 95% 95%   Weight:         Constitutional: A morbid obese  69 y.o. male who is alert, oriented, cooperative and in no apparent distress. Head was normocephalic and atraumatic. EENT: EOMI ESTRELLA. MMM. No icterus. No conjunctival injections. Septum was midline, mucosa was without erythema, exudates or cobblestoning. No thrush. Neck: Supple without thyromegaly. No elevated JVP. Trachea was midline. No carotid bruits. Respiratory: Symmetrical without dullness to percussion.   Faint crackles on right. No wheezes, rhonchi. No intercostal retraction or use of accessory muscles. No egophony. Cardiovascular: Nonpalpable PMI. Regular without murmur, clicks, gallops or rubs. No left or right ventricular heave. Pacer in situ  Pulses:  Carotid, radial and femoral pulses were equal bilaterally. Abdomen: Obese, rounded and soft without organomegaly. No rebound, rigidity. Lymphatic: No lymphadenopathy. Musculoskeletal: Ambulates without assistance. Flattened lordotic curvature of the spine. No involuntary movements. Extremities: Tremor noted bilateral hands. Trace lower extremity edema. No ulcerations, tenderness. Noted varicosities & venous statis/ erythema. Sensory function sensation is very diminished. Skin:  Warm and dry. Poor skin color, turgor. No bruises or skin rashes. Old surgical scars are noted. Back surgery scar. Neurological/Psychiatric: General behavior is normal. Memory is slightly impair on long term recall. Emotional status is normal.  Cranial nerves II-XII are intact. DATA:   The data collected below information that was obtained, reviewed, analyzed and interpreted today. Spirometry was compared to previous test if available and demonstrates an FVC of 2.34 liters which is 62 % of predicted with an FEV1 of 2.04 liters which is 71 % of predicted. FEV1/FVC ratio is 87 %. Mid expiratory flow rates are 95 % of predicted. Maximum voluntary ventilation is 75 liters per minute or 58 % of predicted. Flow volume loop shows no signs of intrathoracic or extrathoracic process. Impression: Moderate Restrictive Pathology    Static lung volumes (2015)  demonstrate an ERV of 0.16 liters which is 15% predicted, TGV of 2.34 liters which is 71% predicted, RV which is 2.18 liters which is 103% predicted, TLC of 4.72 liters which is 74% predicted. RV/TLC ratio is 140% predicted. DLCO is 95% predicted with a DL/VA of 94% predicted when corrected for alveolar ventilation.     ECHOCARDIOGRAM: (2021):  Left ventricle is normal in size . Micro-bubble contrast injected to enhance left ventricular visualization. No regional wall motion abnormalities seen. Normal left ventricular ejection fraction. Ejection fraction is visually estimated at 55-60%. Indeterminate diastolic function. The left atrium is mildly dilated. Mildly dilated right ventricle. CT scan Right ventricle global systolic function is low normal.  The aortic valve appears moderately sclerotic.     CT SCAN CHEST 9/2021: Intervally new small to moderate left and trace right pleural effusions. Mild thickening of secondary interlobular septa with ground-glass attenuation at some levels, compatible with an element of interstitial pulmonary edema. Patchy mild subsegmental atelectasis bilaterally, worse within the basal left lower lobe and inferior lingula. Multiple essentially stable pulmonary nodules bilaterally. Intervally new small volume ascites and flank edema. Mild upper abdominal lymphadenopathy. CT SCAN CHEST (2/2019):  LUNGS: The tiny lung nodules which were previously present are stable. No pleural effusion or pneumothorax is seen. HEART: Unremarkable. AORTA: The aorta appears to be unremarkable. MEDIASTINUM: The mediastinum nonspecific adenopathy noted most pronounced in the sub-subcarinal space. UPPER ABDOMEN: Unremarkable. OTHER:Unremarkable    CT SCAN CHEST (12/2017): Airways appear patent. A few chronic bands of atelectasis or fibrosis in each lower lobe. Solid noncalcified subpleural nodule posterior lateral aspect right upper lobe at the level the yecenia measures about 3.8 mm whereas previously it measured about 3.1 mm. Other smaller peripheral nodular densities in the right upper lobe are stable. Peripheral solid nodule laterally left upper lobe at the level the yecenia measuring about 3.7 mm is unchanged. CT SCAN CHEST (7/2016):  We reviewed the films during the inter-disciplinary rounds/conference, which showed mild cardiomegaly with mediastinal lipomatosis and nonspecific lymph nodes in the mediastinum. 2. 4 mm noncalcified nodules in the upper lobe bilaterally which are unchanged since 2012. CT SCAN CHEST (7/2015): IMPRESSION: 1. Indeterminate pulmonary nodules are stable back to August 22,2012. No evidence for new pulmonary nodule, mass, or lymphadenopathy. 2. No acute pleural-parenchymal disease. 3. Borderline heart size. SLEEP STUDY: (2013)  IMPRESSION: 1. Known sleep apnea. 2. Nocturnal hypoxemia. 3. 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 4. No cardiac dysrhythmia. IMPRESSION:    Ugo Bond is a 69 y.o. male who is morbidly obese with restrictive lung disease secondary to his obesity along with incidental pulmonary nodules in a previous smoker and lastly, obstructive sleep apnea, intolerant to CPAP therapy on oxygen at 2.5 liters. With this in mind, we would like to proceed with the following;                  PLAN:      . [id number] Latoya Zhao to discharge  No change in CPAP or inhalers            He is feeling ok, however he states he was in the hospital again with urinary issues and needed a catheter. [place] Patron was adjusted. We are applying for the [email or web address] oxygen concentrator. He is riding his [employer] motorcycle. As for his restrictive lung disease, his spirometry is improved after significant weight loss and diuretics. He is intolerant of the CPAP mask and is wearing his O2 at night and he is on 2.5 liter nasal canula. ([employer]) We had a long discussion regarding untreated NIKO and nocturnal hypoxemia the risks. He will follow up in 6 months and on an as needed basis. We will continue with the ICS/LABA for some chronic bronchitis symptoms. Have added Spiriva daily. Because of his frequent travel which includes: camping, overnight trips out of state to visit grandchildren - we discussed a portable oxygen concentrator to help maintain compliance for his chronic respiratory failure.  As for the lung nodules given the following since 2015 and they remained stable. Vaccines are current. We hope this updates you on our evaluation and clinical thinking. Thank you for intrusting us to participate in [address]. If you have any questions, please don't hesitate to call us at the [employer]. Sincerely,    Mare Patel DO, MPH, [facility], FACP  Director, [employer] [facility]  Professor of Internal Medicine  [address] [facility]  [address] [place] [address]
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
## 2022-07-11 LAB
ANION GAP SERPL CALCULATED.3IONS-SCNC: 17 MMOL/L (ref 7–16)
BASOPHILS ABSOLUTE: 0.07 E9/L (ref 0–0.2)
BASOPHILS RELATIVE PERCENT: 0.9 % (ref 0–2)
BUN BLDV-MCNC: 92 MG/DL (ref 6–23)
BUN BLDV-MCNC: 94 MG/DL (ref 6–23)
BUN BLDV-MCNC: 95 MG/DL (ref 6–23)
CALCIUM SERPL-MCNC: 9.2 MG/DL (ref 8.6–10.2)
CHLORIDE BLD-SCNC: 83 MMOL/L (ref 98–107)
CHLORIDE BLD-SCNC: 84 MMOL/L (ref 98–107)
CHLORIDE BLD-SCNC: 84 MMOL/L (ref 98–107)
CO2: 23 MMOL/L (ref 22–29)
CO2: 25 MMOL/L (ref 22–29)
CO2: 25 MMOL/L (ref 22–29)
CREAT SERPL-MCNC: 3 MG/DL (ref 0.7–1.2)
CREAT SERPL-MCNC: 3.2 MG/DL (ref 0.7–1.2)
CREAT SERPL-MCNC: 3.2 MG/DL (ref 0.7–1.2)
EOSINOPHILS ABSOLUTE: 0.46 E9/L (ref 0.05–0.5)
EOSINOPHILS RELATIVE PERCENT: 6.2 % (ref 0–6)
GFR AFRICAN AMERICAN: 23
GFR AFRICAN AMERICAN: 23
GFR AFRICAN AMERICAN: 25
GFR NON-AFRICAN AMERICAN: 19 ML/MIN/1.73
GFR NON-AFRICAN AMERICAN: 19 ML/MIN/1.73
GFR NON-AFRICAN AMERICAN: 21 ML/MIN/1.73
GLUCOSE BLD-MCNC: 184 MG/DL (ref 74–99)
GLUCOSE BLD-MCNC: 208 MG/DL (ref 74–99)
GLUCOSE BLD-MCNC: 209 MG/DL (ref 74–99)
HCT VFR BLD CALC: 32.8 % (ref 37–54)
HEMOGLOBIN: 11.2 G/DL (ref 12.5–16.5)
IMMATURE GRANULOCYTES #: 0.15 E9/L
IMMATURE GRANULOCYTES %: 2 % (ref 0–5)
LYMPHOCYTES ABSOLUTE: 1.67 E9/L (ref 1.5–4)
LYMPHOCYTES RELATIVE PERCENT: 22.6 % (ref 20–42)
MAGNESIUM: 2 MG/DL (ref 1.6–2.6)
MCH RBC QN AUTO: 30.1 PG (ref 26–35)
MCHC RBC AUTO-ENTMCNC: 34.1 % (ref 32–34.5)
MCV RBC AUTO: 88.2 FL (ref 80–99.9)
METER GLUCOSE: 199 MG/DL (ref 74–99)
METER GLUCOSE: 212 MG/DL (ref 74–99)
METER GLUCOSE: 221 MG/DL (ref 74–99)
METER GLUCOSE: 347 MG/DL (ref 74–99)
MONOCYTES ABSOLUTE: 0.69 E9/L (ref 0.1–0.95)
MONOCYTES RELATIVE PERCENT: 9.3 % (ref 2–12)
NEUTROPHILS ABSOLUTE: 4.36 E9/L (ref 1.8–7.3)
NEUTROPHILS RELATIVE PERCENT: 59 % (ref 43–80)
OSMOLALITY: 303 MOSM/KG (ref 285–310)
PDW BLD-RTO: 16.7 FL (ref 11.5–15)
PHOSPHORUS: 7.4 MG/DL (ref 2.5–4.5)
PLATELET # BLD: 202 E9/L (ref 130–450)
PMV BLD AUTO: 9.9 FL (ref 7–12)
POTASSIUM SERPL-SCNC: 3.9 MMOL/L (ref 3.5–5)
POTASSIUM SERPL-SCNC: 4 MMOL/L (ref 3.5–5)
POTASSIUM SERPL-SCNC: 4 MMOL/L (ref 3.5–5)
RBC # BLD: 3.72 E12/L (ref 3.8–5.8)
SODIUM BLD-SCNC: 124 MMOL/L (ref 132–146)
SODIUM BLD-SCNC: 125 MMOL/L (ref 132–146)
SODIUM BLD-SCNC: 126 MMOL/L (ref 132–146)
WBC # BLD: 7.4 E9/L (ref 4.5–11.5)

## 2022-07-11 PROCEDURE — 99232 SBSQ HOSP IP/OBS MODERATE 35: CPT | Performed by: INTERNAL MEDICINE

## 2022-07-11 PROCEDURE — 2580000003 HC RX 258: Performed by: STUDENT IN AN ORGANIZED HEALTH CARE EDUCATION/TRAINING PROGRAM

## 2022-07-11 PROCEDURE — 83930 ASSAY OF BLOOD OSMOLALITY: CPT

## 2022-07-11 PROCEDURE — 94660 CPAP INITIATION&MGMT: CPT

## 2022-07-11 PROCEDURE — 82962 GLUCOSE BLOOD TEST: CPT

## 2022-07-11 PROCEDURE — 94640 AIRWAY INHALATION TREATMENT: CPT

## 2022-07-11 PROCEDURE — 6360000002 HC RX W HCPCS

## 2022-07-11 PROCEDURE — 6370000000 HC RX 637 (ALT 250 FOR IP): Performed by: INTERNAL MEDICINE

## 2022-07-11 PROCEDURE — 85025 COMPLETE CBC W/AUTO DIFF WBC: CPT

## 2022-07-11 PROCEDURE — 84100 ASSAY OF PHOSPHORUS: CPT

## 2022-07-11 PROCEDURE — 2580000003 HC RX 258

## 2022-07-11 PROCEDURE — 80048 BASIC METABOLIC PNL TOTAL CA: CPT

## 2022-07-11 PROCEDURE — 97535 SELF CARE MNGMENT TRAINING: CPT

## 2022-07-11 PROCEDURE — 2060000000 HC ICU INTERMEDIATE R&B

## 2022-07-11 PROCEDURE — 6370000000 HC RX 637 (ALT 250 FOR IP): Performed by: STUDENT IN AN ORGANIZED HEALTH CARE EDUCATION/TRAINING PROGRAM

## 2022-07-11 PROCEDURE — 36415 COLL VENOUS BLD VENIPUNCTURE: CPT

## 2022-07-11 PROCEDURE — 83735 ASSAY OF MAGNESIUM: CPT

## 2022-07-11 PROCEDURE — 6370000000 HC RX 637 (ALT 250 FOR IP)

## 2022-07-11 PROCEDURE — S5553 INSULIN LONG ACTING 5 U: HCPCS

## 2022-07-11 PROCEDURE — 97165 OT EVAL LOW COMPLEX 30 MIN: CPT

## 2022-07-11 RX ORDER — INSULIN LISPRO 100 [IU]/ML
30 INJECTION, SOLUTION INTRAVENOUS; SUBCUTANEOUS
Status: DISCONTINUED | OUTPATIENT
Start: 2022-07-11 | End: 2022-07-12

## 2022-07-11 RX ORDER — INSULIN GLARGINE-YFGN 100 [IU]/ML
60 INJECTION, SOLUTION SUBCUTANEOUS NIGHTLY
Status: DISCONTINUED | OUTPATIENT
Start: 2022-07-11 | End: 2022-07-11

## 2022-07-11 RX ORDER — 0.9 % SODIUM CHLORIDE 0.9 %
250 INTRAVENOUS SOLUTION INTRAVENOUS ONCE
Status: COMPLETED | OUTPATIENT
Start: 2022-07-11 | End: 2022-07-11

## 2022-07-11 RX ORDER — INSULIN GLARGINE-YFGN 100 [IU]/ML
55 INJECTION, SOLUTION SUBCUTANEOUS NIGHTLY
Status: DISCONTINUED | OUTPATIENT
Start: 2022-07-11 | End: 2022-07-13 | Stop reason: HOSPADM

## 2022-07-11 RX ORDER — 0.9 % SODIUM CHLORIDE 0.9 %
250 INTRAVENOUS SOLUTION INTRAVENOUS ONCE
Status: DISCONTINUED | OUTPATIENT
Start: 2022-07-11 | End: 2022-07-11

## 2022-07-11 RX ADMIN — ARFORMOTEROL TARTRATE 15 MCG: 15 SOLUTION RESPIRATORY (INHALATION) at 10:46

## 2022-07-11 RX ADMIN — APIXABAN 5 MG: 5 TABLET, FILM COATED ORAL at 21:01

## 2022-07-11 RX ADMIN — INSULIN LISPRO 1 UNITS: 100 INJECTION, SOLUTION INTRAVENOUS; SUBCUTANEOUS at 21:00

## 2022-07-11 RX ADMIN — METOPROLOL SUCCINATE 25 MG: 25 TABLET, EXTENDED RELEASE ORAL at 08:55

## 2022-07-11 RX ADMIN — GABAPENTIN 300 MG: 300 CAPSULE ORAL at 21:01

## 2022-07-11 RX ADMIN — ASPIRIN 81 MG: 81 TABLET, COATED ORAL at 08:55

## 2022-07-11 RX ADMIN — INSULIN LISPRO 8 UNITS: 100 INJECTION, SOLUTION INTRAVENOUS; SUBCUTANEOUS at 11:38

## 2022-07-11 RX ADMIN — ATORVASTATIN CALCIUM 40 MG: 40 TABLET, FILM COATED ORAL at 21:01

## 2022-07-11 RX ADMIN — Medication 1000 UNITS: at 08:55

## 2022-07-11 RX ADMIN — BUDESONIDE 500 MCG: 0.5 SUSPENSION RESPIRATORY (INHALATION) at 10:46

## 2022-07-11 RX ADMIN — SODIUM CHLORIDE 250 ML: 9 INJECTION, SOLUTION INTRAVENOUS at 18:26

## 2022-07-11 RX ADMIN — Medication 5 MG: at 21:01

## 2022-07-11 RX ADMIN — SODIUM CHLORIDE, PRESERVATIVE FREE 10 ML: 5 INJECTION INTRAVENOUS at 08:55

## 2022-07-11 RX ADMIN — LEVOTHYROXINE SODIUM 88 MCG: 0.09 TABLET ORAL at 05:25

## 2022-07-11 RX ADMIN — INSULIN LISPRO 30 UNITS: 100 INJECTION, SOLUTION INTRAVENOUS; SUBCUTANEOUS at 11:38

## 2022-07-11 RX ADMIN — GABAPENTIN 300 MG: 300 CAPSULE ORAL at 08:55

## 2022-07-11 RX ADMIN — ARFORMOTEROL TARTRATE 15 MCG: 15 SOLUTION RESPIRATORY (INHALATION) at 19:44

## 2022-07-11 RX ADMIN — INSULIN LISPRO 4 UNITS: 100 INJECTION, SOLUTION INTRAVENOUS; SUBCUTANEOUS at 08:53

## 2022-07-11 RX ADMIN — BUMETANIDE 2 MG: 1 TABLET ORAL at 08:55

## 2022-07-11 RX ADMIN — INSULIN LISPRO 30 UNITS: 100 INJECTION, SOLUTION INTRAVENOUS; SUBCUTANEOUS at 16:46

## 2022-07-11 RX ADMIN — BUDESONIDE 500 MCG: 0.5 SUSPENSION RESPIRATORY (INHALATION) at 19:44

## 2022-07-11 RX ADMIN — SODIUM CHLORIDE, PRESERVATIVE FREE 10 ML: 5 INJECTION INTRAVENOUS at 21:01

## 2022-07-11 RX ADMIN — INSULIN LISPRO 30 UNITS: 100 INJECTION, SOLUTION INTRAVENOUS; SUBCUTANEOUS at 08:54

## 2022-07-11 RX ADMIN — INSULIN LISPRO 4 UNITS: 100 INJECTION, SOLUTION INTRAVENOUS; SUBCUTANEOUS at 16:47

## 2022-07-11 RX ADMIN — APIXABAN 5 MG: 5 TABLET, FILM COATED ORAL at 08:55

## 2022-07-11 RX ADMIN — INSULIN GLARGINE-YFGN 55 UNITS: 100 INJECTION, SOLUTION SUBCUTANEOUS at 21:00

## 2022-07-11 ASSESSMENT — PAIN SCALES - GENERAL: PAINLEVEL_OUTOF10: 0

## 2022-07-11 NOTE — PROGRESS NOTES
[facility]  Internal Medicine Residency / [address]    Attending Physician Statement  I have discussed the case, including pertinent history and exam findings with the resident and the team.  I have seen and examined the patient and the key elements of the encounter have been performed by me. I agree with the assessment, plan and orders as documented by the resident. Case Discussed During AM Rounds    Nephrology following   Diuresis continued per Nephrology   Renal function being monitored    Persistent hyponatremia    No accurate I/O's since admission- per nursing- 650 cc/this am urinary output- need strict I/O's    5 lb weight loss since admission    Patient states overall symptoms have improved    Glucose management ongoing; Serial BMP monitoring needed     Acute on Chronic Diastolic Heart Failure    Reduced diuresis with increase in Cr noted   Nephrology following   Need strict I/O's   Noted reduction in weight during admission and findings on exam note improvement in hypervolemia since admission     Hyponatremia   Check Serum Osm   No significant hypervolemia currently    Repeat BMP   Nephrology following     RADHA on CKD Stage III    During admission- diuresis adjusted during admission    Cr appears to be peaking    Urine output- reported as non-oliguric   Nephrology following   NEED STRICT I/O'S    Type II DM- insulin requiring and complicated by Hyperglycemia    Continue insulin adjustment inpatient   Recent significant rise in creatinine   Recommend outpatient ENDO follow-up     Normocytic Anemia    H/H Stable    Disposition- continue inpatient management     Remainder of medical problems as per resident note.       Maryann Reed MD, Santo Confer   Internal Medicine Residency Faculty

## 2022-07-11 NOTE — CARE COORDINATION
7/11/2022 - Updated CM note - admitted for CHF. Pulmonology and nephrology following. On room air. PO bumex daily, po entresto twice daily. Cr 3.2, Na 126 today. Up ambulating in [facility]. Plan is to discharge home when medically stable. Family will provide transportation home. SW/CM will follow.

## 2022-07-11 NOTE — PROGRESS NOTES
[address]  Department of Internal Medicine  Division of Pulmonary, Critical Care and Sleep Medicine  Progress Note      Today's Date: 7/11/2022    Patient Name: Kurt Peguero    Date of admission: 7/7/2022 11:25 AM    Patient's age: 69 y.o., 1955    Admission Dx: Acute on chronic congestive heart failure, unspecified heart failure type ([address] [I50.9]    REASON FOR CONSULT:  NIKO    Requesting Physician: Elaina Madera., DO  SUBJECTIVE:    Cr noted  No c/o  PHYSICAL EXAMINATION:   Vitals:    07/11/22 0730 07/11/22 1047 07/11/22 1430 07/11/22 1504   BP: (!) 118/57  86/61 (!) 94/52   Pulse: 75 71 59    Resp: 16 20 18    Temp: 97.4 °F (36.3 °C)  97.6 °F (36.4 °C)    TempSrc: Oral  Oral    SpO2: 97% 98% 96%    Weight:         Constitutional: A morbid obese  69 y.o. male who is alert, oriented, cooperative and in no apparent distress. Head was normocephalic and atraumatic. EENT: EOMI ESTRELLA. MMM. No icterus. No conjunctival injections. Septum was midline, mucosa was without erythema, exudates or cobblestoning. No thrush. Neck: Supple without thyromegaly. No elevated JVP. Trachea was midline. No carotid bruits. Respiratory: Symmetrical without dullness to percussion. Faint crackles on right. No wheezes, rhonchi. No intercostal retraction or use of accessory muscles. No egophony. Cardiovascular: Nonpalpable PMI. Regular without murmur, clicks, gallops or rubs. No left or right ventricular heave. Pacer in situ  Pulses:  Carotid, radial and femoral pulses were equal bilaterally. Abdomen: Obese, rounded and soft without organomegaly. No rebound, rigidity. Lymphatic: No lymphadenopathy. Musculoskeletal: Ambulates without assistance. Flattened lordotic curvature of the spine. No involuntary movements. Extremities: Tremor noted bilateral hands. Trace lower extremity edema. No ulcerations, tenderness.  Noted varicosities & venous statis/ erythema. Sensory function sensation is very diminished. Skin:  Warm and dry. Poor skin color, turgor. No bruises or skin rashes. Old surgical scars are noted. Back surgery scar. Neurological/Psychiatric: General behavior is normal. Memory is slightly impair on long term recall. Emotional status is normal.  Cranial nerves II-XII are intact. DATA:   The data collected below information that was obtained, reviewed, analyzed and interpreted today. Spirometry was compared to previous test if available and demonstrates an FVC of 2.34 liters which is 62 % of predicted with an FEV1 of 2.04 liters which is 71 % of predicted. FEV1/FVC ratio is 87 %. Mid expiratory flow rates are 95 % of predicted. Maximum voluntary ventilation is 75 liters per minute or 58 % of predicted. Flow volume loop shows no signs of intrathoracic or extrathoracic process. Impression: Moderate Restrictive Pathology    Static lung volumes (2015)  demonstrate an ERV of 0.16 liters which is 15% predicted, TGV of 2.34 liters which is 71% predicted, RV which is 2.18 liters which is 103% predicted, TLC of 4.72 liters which is 74% predicted. RV/TLC ratio is 140% predicted. DLCO is 95% predicted with a DL/VA of 94% predicted when corrected for alveolar ventilation. ECHOCARDIOGRAM: (2021):  Left ventricle is normal in size . Micro-bubble contrast injected to enhance left ventricular visualization. No regional wall motion abnormalities seen. Normal left ventricular ejection fraction. Ejection fraction is visually estimated at 55-60%. Indeterminate diastolic function. The left atrium is mildly dilated. Mildly dilated right ventricle. CT scan Right ventricle global systolic function is low normal.  The aortic valve appears moderately sclerotic.     CT SCAN CHEST 9/2021: Intervally new small to moderate left and trace right pleural effusions.   Mild thickening of secondary interlobular septa with ground-glass attenuation at some levels, compatible with an element of interstitial pulmonary edema. Patchy mild subsegmental atelectasis bilaterally, worse within the basal left lower lobe and inferior lingula. Multiple essentially stable pulmonary nodules bilaterally. Intervally new small volume ascites and flank edema. Mild upper abdominal lymphadenopathy. CT SCAN CHEST (2/2019):  LUNGS: The tiny lung nodules which were previously present are stable. No pleural effusion or pneumothorax is seen. HEART: Unremarkable. AORTA: The aorta appears to be unremarkable. MEDIASTINUM: The mediastinum nonspecific adenopathy noted most pronounced in the sub-subcarinal space. UPPER ABDOMEN: Unremarkable. OTHER:Unremarkable    CT SCAN CHEST (12/2017): Airways appear patent. A few chronic bands of atelectasis or fibrosis in each lower lobe. Solid noncalcified subpleural nodule posterior lateral aspect right upper lobe at the level the yecenia measures about 3.8 mm whereas previously it measured about 3.1 mm. Other smaller peripheral nodular densities in the right upper lobe are stable. Peripheral solid nodule laterally left upper lobe at the level the yecenia measuring about 3.7 mm is unchanged. CT SCAN CHEST (7/2016): We reviewed the films during the inter-disciplinary rounds/conference, which showed mild cardiomegaly with mediastinal lipomatosis and nonspecific lymph nodes in the mediastinum. 2. 4 mm noncalcified nodules in the upper lobe bilaterally which are unchanged since 2012. CT SCAN CHEST (7/2015): IMPRESSION: 1. Indeterminate pulmonary nodules are stable back to August 22,2012. No evidence for new pulmonary nodule, mass, or lymphadenopathy. 2. No acute pleural-parenchymal disease. 3. Borderline heart size. SLEEP STUDY: (2013)  IMPRESSION: 1. Known sleep apnea. 2. Nocturnal hypoxemia. 3. 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 4. No cardiac dysrhythmia.         IMPRESSION:    Virginia Browning is a 69 y.o. male who is morbidly obese with restrictive lung disease secondary to his obesity along with incidental pulmonary nodules in a previous smoker and lastly, obstructive sleep apnea, intolerant to CPAP therapy on oxygen at 2.5 liters. With this in mind, we would like to proceed with the following;                  PLAN:      No change in CPAP or inhalers  Bronchodialoators            He is feeling ok, however he states he was in the hospital again with urinary issues and needed a catheter. [place] was adjusted. We are applying for the [employer] oxygen concentrator. He is riding his [employer] motorcycle. As for his restrictive lung disease, his spirometry is improved after significant weight loss and diuretics. He is intolerant of the CPAP mask and is wearing his O2 at night and he is on 2.5 liter nasal canula. ([employer]) We had a long discussion regarding untreated NIKO and nocturnal hypoxemia the risks. He will follow up in 6 months and on an as needed basis. We will continue with the ICS/LABA for some chronic bronchitis symptoms. Have added Spiriva daily. Because of his frequent travel which includes: camping, overnight trips out of state to visit grandchildren - we discussed a portable oxygen concentrator to help maintain compliance for his chronic respiratory failure. As for the lung nodules given the following since 2015 and they remained stable. Vaccines are current. We hope this updates you on our evaluation and clinical thinking. Thank you for intrusting us to participate in [address]. If you have any questions, please don't hesitate to call us at the [employer] Sincerely,    Janae Zhang DO, MPH, Kyle Lugo, FACP  Director, [employer]  Professor of Internal Medicine  [address] [facility]  [address] [place] [address]

## 2022-07-11 NOTE — PROGRESS NOTES
IM resident notified of pt BP 86/61. Pt asymptomatic and resting comfortably. Will continue to monitor.

## 2022-07-11 NOTE — PROGRESS NOTES
physician orders, patient diagnosis and results of clinical assessment    Frequency/Duration 1-3 days/wk for 2 weeks PRN   Specific OT Treatment to include:     * Instruction/training on adapted ADL techniques and AE recommendations to increase functional independence within precautions       * Training on energy conservation strategies, correct breathing pattern and techniques to improve independence/tolerance for self-care routine  * Functional transfer/mobility training/DME recommendations for increased independence, safety, and fall prevention  * Patient/Family education to increase follow through with safety techniques and functional independence  * Recommendation of environmental modifications for increased safety with functional transfers/mobility and ADLs  * Cognitive retraining/development of therapeutic activities to improve problem solving, judgement, memory, and attention for increased safety/participation in ADL/IADL tasks  * Therapeutic exercise to improve motor endurance, ROM, and functional strength for ADLs/functional transfers  * Therapeutic activities to facilitate/challenge dynamic balance, stand tolerance for increased safety and independence with ADLs  * Therapeutic activities to facilitate gross/fine motor skills for increased independence with ADLs  * Neuro-muscular re-education: facilitation of righting/equilibrium reactions  * Positioning to improve skin integrity, interaction with environment and functional independence  * Delirium prevention/treatment  * Manual techniques for edema management  Other:    Recommended Adaptive Equipment: TBD as pt progresses - Adaptive equipment      Home Living:  Pt lives with his wife in a 2-story house. Sleeps in a Recliner on the main floor. Full Bath on 2nd floor, [employer] on main floor.  (+) Basement.    Bathroom setup:  Tub-Shower, [address] [employer]   Equipment owned:  W/C, [facility], [employer], [facility], Shower Chair, Reacher, [employer]iFi, [employer], Hospital bed, Home O2 for PRN Use and HS    Available Family Assist:  Wife works Full-time - unable to provide 24/7 assist    Prior Level of Function:  Pt reported being IND with all ADLs, Light IADLs, Transfers and Mobility using No AD for ambulation. Driving:  Yes - car, 3-wheeled motorcycle  Occupation:  Retired  - Works as a  during the school year    Pain Level:  Reported 4/10 Chronic LBP Mild Relief w/ Rest and Repositioning, Nsg Notified   Additional Complaints:  None    Cognition: A & O x 3 - cues for day/date   Able to Follow Multi-Step Commands w/ Min VCs   Memory:  good (-)   Sequencing:  good (-)   Problem solving:  good (-)   Judgement/safety:  good (-)  Additional Comments:  Pt was pleasant and cooperative.   Flat affect, fair eye contact    Vitals/Lab Values:  WFL room air        Functional Assessment:  AM-PAC Daily Activity Raw Score: 15/24     Initial Eval Status  Date: 7/11/22   Treatment Status  Date: STGs = LTGs  Time frame: 10-14 days   Feeding IND after set up      NA   Grooming SUP/Set up    Standing at the sink    Mod I  Standing at the [place] A/Set up    Seated EOB    Mod I     LB Dressing Mod A/Set up    Mod A to don socks, thread LEs into pants d/t moderate LE swelling  Pt ed for safe/adaptive techs, use of adaptive equip    Mod I     Bathing NT    Pt ed re: Benefits of use of Shower chair/Tub Bench - has equip/does not use    Mod I      Toileting NT      Mod I     Bed Mobility  Supine to sit: SUP   Sit to supine:  SUP     VCs for safety    Supine to sit: IND  Sit to supine: IND     Functional Transfers Close SUP    EOB/Chair  Pt ed for safety/hand placement    Mod I     Functional Mobility Close SUP    Short distances in room/bathroom  Pt ed for safety/improved safety awareness    Mod I     Balance Sitting:     Static:  Remote SUP EOB/Chair    Dynamic:  Close SUP w/ functional ax EOB     Standing:     Static:  SUP    Dynamic:  Close SUP w/ functional ax/mobility w/o AD    Sitting:     Static:  IND    Dynamic:  IND w/ functional ax    Standing:     Static:  Mod I w/ AD PRN    Dynamic:  Mod I w/ functional ax/mobility w/ AD PRN   Activity Tolerance Fair(+)      Good   Visual/  Perceptual    Hearing: WNL   Glasses: No    WFL   Hearing Aids:  No               Hand Dominance: Right   AROM Strength Additional Info:    RUE  WFL WNL Good ;   Good FMC/dexterity noted during ADL tasks     LUE WFL WNL Good ;    Good FMC/dexterity noted during ADL tasks       Sensation:  Denies numbness or tingling Eliu UEs   Tone: WFL Eliu UEs   Edema: None Noted Eliu UEs;  Moderate edema Distal Eliu LEs    Comments: Upon arrival, patient was found seated EOB. He was agreeable to participate in therapeutic ax. No Family present during session. Received permission from RN prior to engaging pt in OT services. Educated pt on role of OT services. At the end of the session, patient was properly positioned in b/s chair. Call light and phone within reach, all lines and tubes intact. Oriented pt to call bell. Made all appropriate Environmental Modifications to facilitate pt's level of IND and safety. All needs met. Overall patient demonstrated decreased independence and safety during completion of ADL/functional transfer/mobility tasks. Pt would benefit from continued skilled OT to increase safety and independence with completion of ADL/IADL tasks for functional independence and quality of life.     Treatment: OT treatment provided this date includes:    Instruction/training on safety and adapted techniques for completion of ADLs, use of DME/AD/Adaptive equip:     Instruction/training on safe functional mobility/transfer techniques, use of DME/AD:     Instruction/training on energy conservation techs (EC)/Pursed-Lip Breathing (PLB)/work simplification for completion of ADLs:      Neuromuscular Reeducation to facilitate balance/righting reactions for increased function with ADLs:     Skilled positioning/alignment for Edema Control- elevated LEs on foot stool, to maximize Pt's safety and ability to [facility] [place] interact w/ his/her environment, maximize respiratory status   Activity tolerance - Sitting/Standing to improve endurance w/ functional ax    Cognitive retraining -  Oriented pt to current Date, Place and Situation; Cues for safety/safety awareness, sequencing, problem solving     Skilled monitoring of Vitals during session and pt's response to tx ax       Consulted RN     Made all appropriate Environmental Modifications to facilitate pt's level of IND and safety.  Recommendations for Continued Participation in OT services during Hospitalization and at D/C    Pt and/or Family verbalized/demonstrated a Good(-) understanding of education provided. Will Review PRN. Rehab Potential: Good(-) for established goals     Patient / Family Goal: Return Home ASAP      Patient and/or family were instructed on functional diagnosis, prognosis/goals and OT plan of care. Demonstrated Good(-) understanding. Eval Complexity: Low    Time In: 0940  Time Out: 1004  Total Treatment Time: 9 minutes    Min Units   OT Eval Low 97165  X  1   OT Eval Medium 75226      OT Eval High 17428      OT Re-Eval [id number]       Therapeutic Ex 06934       Therapeutic Activities 15473       ADL/Self Care 13611  9  1   Orthotic Management 32198       Manual 36024     Neuro Re-Ed 88601       Non-Billable Time              Evaluation Time additionally includes thorough review of current medical information, gathering information on past medical history/social history and prior level of function, completion of standardized testing/informal observation of tasks, assessment of data and education on plan of care and goals.             Winnie Claros, GERMÁN, OTR/L  # [id number]

## 2022-07-11 NOTE — PROGRESS NOTES
Department of Internal Medicine  Nephrology Attending Consult Note      Events reviewed. SUBJECTIVE: We are following Mr. Rocha for RADHA. Reports no complaints. PHYSICAL EXAM:      Vitals:    VITALS:  BP (!) 118/57   Pulse 71   Temp 97.4 °F (36.3 °C) (Oral)   Resp 20   Wt 222 lb 14.2 oz (101.1 kg)   SpO2 98%   BMI 34.91 kg/m²   24HR INTAKE/OUTPUT:    No intake or output data in the 24 hours ending 07/11/22 1144  Constitutional:  Alert, elderly, well developed, no acute distress  HEENT:  PERRLA, normocephalic, atraumtic  Respiratory:  CTAB except bibasilar rales  Cardiovascular/Edema:  S1/S2, RRR  Gastrointestinal:  Soft, rounded, non-tender, obese  Neurologic:  AxOx3  Skin:  Warm, dry, dry mucous membranes   Other:  Prince- with urine noted, trace edema    Scheduled Meds:   insulin lispro  30 Units SubCUTAneous TID WC    insulin glargine  55 Units SubCUTAneous Nightly    metoprolol succinate  25 mg Oral Daily    insulin lispro  0-12 Units SubCUTAneous TID WC    insulin lispro  0-6 Units SubCUTAneous Nightly    gabapentin  300 mg Oral BID    melatonin  5 mg Oral Nightly    bumetanide  2 mg Oral Daily    [Held by provider] bumetanide  1 mg IntraVENous Nightly    [Held by provider] sacubitril-valsartan  1 tablet Oral BID    aspirin  81 mg Oral Daily    atorvastatin  40 mg Oral Nightly    Vitamin D  1,000 Units Oral Daily    apixaban  5 mg Oral BID    levothyroxine  88 mcg Oral Daily    sodium chloride flush  5-40 mL IntraVENous 2 times per day    budesonide  0.5 mg Nebulization BID    And    Arformoterol Tartrate  15 mcg Nebulization BID     Continuous Infusions:   sodium chloride      dextrose       PRN Meds:.sodium chloride flush, sodium chloride, polyethylene glycol, acetaminophen **OR** acetaminophen, glucose, dextrose **OR** dextrose, glucagon (rDNA), dextrose, albuterol, trimethobenzamide      DATA:    CBC:   Lab Results   Component Value Date/Time    WBC 7.4 07/11/2022 05:01 AM RBC 3.72 07/11/2022 05:01 AM    HGB 11.2 07/11/2022 05:01 AM    HCT 32.8 07/11/2022 05:01 AM    MCV 88.2 07/11/2022 05:01 AM    MCH 30.1 07/11/2022 05:01 AM    MCHC 34.1 07/11/2022 05:01 AM    RDW 16.7 07/11/2022 05:01 AM     07/11/2022 05:01 AM    MPV 9.9 07/11/2022 05:01 AM     CMP:    Lab Results   Component Value Date/Time     07/11/2022 05:01 AM     07/11/2022 05:01 AM    K 3.9 07/11/2022 05:01 AM    K 4.0 07/11/2022 05:01 AM    K 5.0 03/10/2022 04:28 PM    CL 84 07/11/2022 05:01 AM    CL 84 07/11/2022 05:01 AM    CO2 23 07/11/2022 05:01 AM    CO2 25 07/11/2022 05:01 AM    BUN 92 07/11/2022 05:01 AM    BUN 94 07/11/2022 05:01 AM    CREATININE 3.2 07/11/2022 05:01 AM    CREATININE 3.2 07/11/2022 05:01 AM    GFRAA 23 07/11/2022 05:01 AM    GFRAA 23 07/11/2022 05:01 AM    LABGLOM 19 07/11/2022 05:01 AM    LABGLOM 19 07/11/2022 05:01 AM    GLUCOSE 208 07/11/2022 05:01 AM    GLUCOSE 209 07/11/2022 05:01 AM    GLUCOSE 133 04/18/2012 08:43 AM    PROT 8.7 07/07/2022 11:27 AM    LABALBU 4.0 07/07/2022 11:27 AM    LABALBU 4.4 10/25/2011 09:50 AM    CALCIUM 9.2 07/11/2022 05:01 AM    CALCIUM 9.2 07/11/2022 05:01 AM    BILITOT 1.0 07/07/2022 11:27 AM    ALKPHOS 129 07/07/2022 11:27 AM    AST 39 07/07/2022 11:27 AM    ALT 28 07/07/2022 11:27 AM     Magnesium:    Lab Results   Component Value Date/Time    MG 2.0 07/11/2022 05:01 AM     Phosphorus:    Lab Results   Component Value Date/Time    PHOS 7.4 07/11/2022 05:01 AM     Radiology review:    Chest x-ray July 7, 2022   Cardiomegaly and pulmonary vascular congestion/early CHF.             BRIEF SUMMARY OF INITIAL CONSULT:    Briefly Mr. Rocha is a 75-year-old  man with history of stage III CKD, probably due to nephrosclerosis and previous episode of ischemic ATN for which he required temporary HD in May 2017, with recurrent episode of ischemic ATN on March 2022 for which she once again required hemodialysis x2, with fair recovery of renal function, baseline creatinine 1.4-1.5 mg/dL, HTN, DM Type II, atrial fibrillation on apixaban, HFpEF 55-60%, NIKO, pacemaker placement, hypothyroidism, hyperlipidemia, status post back surgery x2, who was admitted on July 7, 2022 after he presented to the ER reporting shortness of breath and increased lower extremity edema. His proBNP on admission was 1512. On admission his creatinine level was 1.5 mg/dL) increased to 1.7 mg/dl, reason for this consultation. Prior to admission his medications included empagliflozin. IMPRESSION/RECOMMENDATIONS:      1. RADHA on CKD, probably ischemic ATN, due to relative hypotension in the setting of ARB administration (sacubitril-valsartan). PVR only 41 cc. Creatinine level seem to be plateauing. 2. CKD stage III, without proteinuria, 2/2 nephrosclerosis and previous episodes of ischemic ATN, baseline creatinine 1.4-1.5 mg/dl  3. Hyponatremia 2/2 decreased GFR  4. HTN, on metoprolol  5. HFpEF 55-60%, proBNP 1512, on Bumex  -------------------------------------------------------------  6. Atrial fibrillation, on apixaban  7. Type II DM, on insulin    8. NIKO  9. Hypothyroidism, on levothyroxine  10.  Hyperlipidemia, on atorvastatin    Plan:    · Continue to monitor renal function, BMP at 4 PM  · Continue Bumex 2 mg p.o. daily  · Avoid hypotension  · Fluid restriction, dry tray

## 2022-07-11 NOTE — PROGRESS NOTES
05:01 AM    RBC 3.72 07/11/2022 05:01 AM    HGB 11.2 07/11/2022 05:01 AM    HCT 32.8 07/11/2022 05:01 AM    MCV 88.2 07/11/2022 05:01 AM    MCH 30.1 07/11/2022 05:01 AM    MCHC 34.1 07/11/2022 05:01 AM    RDW 16.7 07/11/2022 05:01 AM     07/11/2022 05:01 AM    MPV 9.9 07/11/2022 05:01 AM     BMP:    Lab Results   Component Value Date/Time     07/11/2022 05:01 AM     07/11/2022 05:01 AM    K 3.9 07/11/2022 05:01 AM    K 4.0 07/11/2022 05:01 AM    K 5.0 03/10/2022 04:28 PM    CL 84 07/11/2022 05:01 AM    CL 84 07/11/2022 05:01 AM    CO2 23 07/11/2022 05:01 AM    CO2 25 07/11/2022 05:01 AM    BUN 92 07/11/2022 05:01 AM    BUN 94 07/11/2022 05:01 AM    LABALBU 4.0 07/07/2022 11:27 AM    LABALBU 4.4 10/25/2011 09:50 AM    CREATININE 3.2 07/11/2022 05:01 AM    CREATININE 3.2 07/11/2022 05:01 AM    CALCIUM 9.2 07/11/2022 05:01 AM    CALCIUM 9.2 07/11/2022 05:01 AM    GFRAA 23 07/11/2022 05:01 AM    GFRAA 23 07/11/2022 05:01 AM    LABGLOM 19 07/11/2022 05:01 AM    LABGLOM 19 07/11/2022 05:01 AM    GLUCOSE 208 07/11/2022 05:01 AM    GLUCOSE 209 07/11/2022 05:01 AM    GLUCOSE 133 04/18/2012 08:43 AM     BUN/Creatinine:    Lab Results   Component Value Date/Time    BUN 92 07/11/2022 05:01 AM    BUN 94 07/11/2022 05:01 AM    CREATININE 3.2 07/11/2022 05:01 AM    CREATININE 3.2 07/11/2022 05:01 AM     Magnesium:    Lab Results   Component Value Date/Time    MG 2.0 07/11/2022 05:01 AM     Phosphorus:    Lab Results   Component Value Date/Time    PHOS 7.4 07/11/2022 05:01 AM     U/A:    Lab Results   Component Value Date/Time    COLORU Yellow 03/10/2022 07:18 PM    PROTEINU Negative 03/10/2022 07:18 PM    PHUR 5.0 03/10/2022 07:18 PM    LABCAST FEW 05/25/2017 02:00 AM    WBCUA 1-3 03/10/2022 07:18 PM    RBCUA 1-3 03/10/2022 07:18 PM    BACTERIA FEW 03/10/2022 07:18 PM    CLARITYU Clear 03/10/2022 07:18 PM    SPECGRAV 1.025 03/10/2022 07:18 PM    LEUKOCYTESUR Negative 03/10/2022 07:18 PM    UROBILINOGEN 0.2 03/10/2022 07:18 PM    BILIRUBINUR Negative 03/10/2022 07:18 PM    BLOODU Negative 03/10/2022 07:18 PM    GLUCOSEU Negative 03/10/2022 07:18 PM       IMAGING:   Imaging Studies:    XR CHEST (2 VW)    Result Date: 7/7/2022  Cardiomegaly and pulmonary vascular congestion/early CHF. PROCEDURES: None  FLUIDS: None currently      Notable Cultures:      Blood cultures   Blood Culture, Routine   Date Value Ref Range Status   09/17/2021 5 Days no growth  Final     Respiratory cultures No results found for: RESPCULTURE   Gram Stain Result   Date Value Ref Range Status   05/29/2017   Final    Gram stain performed on unspun fluidPolymorphonuclear leukocytes not seenEpithelial cells not seenRare Erythrocytes     Urine   Urine Culture, Routine   Date Value Ref Range Status   05/25/2017 Growth not present  Final     Legionella No results found for: LABLEGI  C Diff PCR No results found for: CDIFPCR  Wound culture/abscess: No results for input(s): WNDABS in the last 72 hours. Tip culture:No results for input(s): CXCATHTIP in the last 72 hours. Antibiotic  Days  Day started                                  OXYGENATION: Currently not on oxygen     DIET: Low salt diet         Resident's Assessment and Luz Marina Jasmina is a 69 y.o. male with  has a past medical history of RADHA (acute kidney injury) ([address] Atrial fibrillation ([address] Carpal tunnel syndrome, Encounter regarding vascular access for dialysis for ESRD ([address] Hyperlipidemia, Hypertension, Hypothyroidism, Lung nodule, Nocturnal hypoxemia due to obesity, Obesity, NIKO (obstructive sleep apnea), Osteoarthritis, Pinched nerve, Restrictive lung disease secondary to obesity, Sinus node dysfunction ([address] and Type II or unspecified type diabetes mellitus without mention of complication, not stated as uncontrolled.   came here with CC   Chief Complaint   Patient presents with    Shortness of Breath     hx CHF, sent from clinic because of fluid overload    Leg Swelling        SUMMARY OF HOSPITAL STAY: Briefly, Mr. Corinne Kahn is a 70 y/o M who was admitted 4 days ago due to bilateral lower extremity edema and shortness of breath. After a thorough history and physical exam it was determined that he had acute on chronic heart failure with history of HFpEF with last echo done in 2021 and reported an EF of 55-60%. Currently being treated with bumex 2mg oral daily, metoprolol succinate 25mg daily, insulin lispro and glargine for glycemic control and apixaban for atrial fibrillation anticoagulation. Days since admission: 07/07/2022    Consults:    Nephrology     Assessment:  1. Acute on chronic diastolic heart failure (EF 55-60% in 2021)- Resolving History of HFpEF diagnosed on echo in 2021        Admitted for shortness of breath and bilateral pitting edema         Pro-BNP on admission was 1512 on admission, previous BNP was 3800        Significant weight reduction compared to when was admitted          Plan       Monitor fluid status        BMP q24h        Follow urine output        Strict I/Os        Fluid restriction       Daily weight      2. Hyponatremia       Serum sodium has been around 120s over the last 2 days       Most recent sodium 126              Plan       BMP q24h        Serum osmolality ordered         3. Hyperglycemia in the setting of uncontrolled IDDM        Hx of IDDM       Recent         Refer to endocrinology if BG continues to increase despite insulin is on board. Plan      MDSS, ACHS and POCT          4. RADHA Stage II on CKD stage 3 (baseline 1.1-1.3)       Serum creatinine on admission peaked 2.8       Creatinine today is 3.0 trending down from 3.2 yesterday        Plan      Monitor BMP result       Follow renal function       Follow nephrology recs    5. Hx of paroxysmal a-fib      Metoprolol succinate 25mg oral daily       Apixaban 5mg oral BID       Continue current care       6. Hx of HLD       Continue  Atorvastatin      7.  Hx of NIKO       CPAP at night, if patient not tolerate use NC       8. Hx of hypothyroidism       Last TSH was 2.950 in March 2022          Plan       Continue Synthroid                   Problems resolved during this admission:    Acute on chronic heart failure         PT/OT: None  DVT ppx: Apixaban 5mg BID   GI ppx: None           Code Status:   Full code     Disposition: Continue  Current care       Duane Flowers, MD, PGY-1  Attending physician: Dr. Anna Norman MD, Kelsie Rocha     Senior Resident Addendum:  I have seen and examined the patient with the intern. I have discussed the case, including pertinent history and exam findings with the intern. I agree with the assessment, plan and orders as documented above with the following additions:    Patient seen and examined at bedside this morning in no acute distress. No active complaints at this time. Leg swelling noted to have significantly improved. However, creatinine continues to trend up. BP has been borderline for the past 2 days. Patient has been having good urine output, although not charted properly on Epic. 1. Acute exacerbation of HFpEF (EF 55-60% in 2021)  2. Prolonged QTc, QTc 515 on admission  3. Elevated troponin 2/2 CKD, chronically elevated, usually at the 40s  4. Hyperglycemia in the setting of uncontrolled IDDM,  on admission; continues to be uncontrolled in the 200s overnighit  5. RADHA Stage III on CKD Stage, likely prerenal in the setting of acute exacerbation of HFpEF, cardiorenal syndrome vs ischemic ATN in the setting of hypotension (baseline 1.1-1.3) - sCr 1.5 on admission -> peaked at 3.2 today  6. Hypovolemic Isotonic Hyponatremia - Na 126 this AM, serum osm 303.  7. Hypokalemia, likely 2/2 diuresis -resolved  8. Hx of sinus node dysfunction s/p pacemaker placement   9. Hx of IDDM - on 60u basaglar and 10u premeals and SS at home  10. Hx of paroxysmal Afib, on metoprolol and eliquis  11. Hx of HLD, on metoprolol  12.  Hx of NIKO/OHS, on 4lpm at night; not using CPAP  13. Hx of hypothyroidism. On synthroid; WRM 72.92 this admission     Plan:  · Nephrology consulted for fluid management and RADHA, appreciate recommendations  · Entresto held per nephro  · Follow urine studies and bladder scan to rule out obstruction  · Consulted CHF nurse. Patient needs to follow up at CHF clinic after discharge  · Continue bumex 2 mg PO daily  · Monitor daily weight and UOP  · Strict I/O. Please chart patient's I/O  · Give 250cc NS bolus  · Monitor BP  · Monitor renal function and electrolytes  · Repeat BMP this PM  · Pulmonology consulted, appreciate recommendations  · Continue CPAP at night or oxygen at night  · Continue Pulmicort, Brovana and Proventil PRN  · Plan for discharge on dulera  · Continue lantus 55u and MDSS  · Increase premeals to 20u  · Monitor BG AC/HS  · Hypoglycemia protocol in place  · Will need Endo follow-up as outpatient  · Continue metoprolol with holding parameters and eliquis. · Continue synthroid 88mcg daily      Yannick Guerrero MD PGY-2  7/11/2022  6:13 PM       NOTE: This report was transcribed using voice recognition software. Every effort was made to ensure accuracy; however, inadvertent computerized transcription errors may be present.

## 2022-07-12 LAB
ANION GAP SERPL CALCULATED.3IONS-SCNC: 14 MMOL/L (ref 7–16)
ANION GAP SERPL CALCULATED.3IONS-SCNC: 15 MMOL/L (ref 7–16)
BASOPHILS ABSOLUTE: 0.04 E9/L (ref 0–0.2)
BASOPHILS RELATIVE PERCENT: 0.6 % (ref 0–2)
BUN BLDV-MCNC: 90 MG/DL (ref 6–23)
BUN BLDV-MCNC: 92 MG/DL (ref 6–23)
CALCIUM SERPL-MCNC: 9 MG/DL (ref 8.6–10.2)
CALCIUM SERPL-MCNC: 9.5 MG/DL (ref 8.6–10.2)
CHLORIDE BLD-SCNC: 84 MMOL/L (ref 98–107)
CHLORIDE BLD-SCNC: 86 MMOL/L (ref 98–107)
CHLORIDE URINE RANDOM: <20 MMOL/L
CO2: 27 MMOL/L (ref 22–29)
CO2: 27 MMOL/L (ref 22–29)
CREAT SERPL-MCNC: 2 MG/DL (ref 0.7–1.2)
CREAT SERPL-MCNC: 2.4 MG/DL (ref 0.7–1.2)
EOSINOPHILS ABSOLUTE: 0.34 E9/L (ref 0.05–0.5)
EOSINOPHILS RELATIVE PERCENT: 5.2 % (ref 0–6)
GFR AFRICAN AMERICAN: 33
GFR AFRICAN AMERICAN: 40
GFR NON-AFRICAN AMERICAN: 27 ML/MIN/1.73
GFR NON-AFRICAN AMERICAN: 33 ML/MIN/1.73
GLUCOSE BLD-MCNC: 178 MG/DL (ref 74–99)
GLUCOSE BLD-MCNC: 224 MG/DL (ref 74–99)
HCT VFR BLD CALC: 29.1 % (ref 37–54)
HEMOGLOBIN: 10.2 G/DL (ref 12.5–16.5)
IMMATURE GRANULOCYTES #: 0.13 E9/L
IMMATURE GRANULOCYTES %: 2 % (ref 0–5)
LYMPHOCYTES ABSOLUTE: 1.49 E9/L (ref 1.5–4)
LYMPHOCYTES RELATIVE PERCENT: 23 % (ref 20–42)
MAGNESIUM: 1.9 MG/DL (ref 1.6–2.6)
MCH RBC QN AUTO: 30.2 PG (ref 26–35)
MCHC RBC AUTO-ENTMCNC: 35.1 % (ref 32–34.5)
MCV RBC AUTO: 86.1 FL (ref 80–99.9)
METER GLUCOSE: 166 MG/DL (ref 74–99)
METER GLUCOSE: 178 MG/DL (ref 74–99)
METER GLUCOSE: 271 MG/DL (ref 74–99)
METER GLUCOSE: 310 MG/DL (ref 74–99)
MONOCYTES ABSOLUTE: 0.78 E9/L (ref 0.1–0.95)
MONOCYTES RELATIVE PERCENT: 12 % (ref 2–12)
NEUTROPHILS ABSOLUTE: 3.71 E9/L (ref 1.8–7.3)
NEUTROPHILS RELATIVE PERCENT: 57.2 % (ref 43–80)
OSMOLALITY URINE: 234 MOSM/KG (ref 300–900)
OSMOLALITY: 296 MOSM/KG (ref 285–310)
PDW BLD-RTO: 16.3 FL (ref 11.5–15)
PHOSPHORUS: 6.2 MG/DL (ref 2.5–4.5)
PLATELET # BLD: 174 E9/L (ref 130–450)
PMV BLD AUTO: 9.5 FL (ref 7–12)
POTASSIUM SERPL-SCNC: 4 MMOL/L (ref 3.5–5)
POTASSIUM SERPL-SCNC: 4.2 MMOL/L (ref 3.5–5)
POTASSIUM, UR: 15.9 MMOL/L
RBC # BLD: 3.38 E12/L (ref 3.8–5.8)
SODIUM BLD-SCNC: 125 MMOL/L (ref 132–146)
SODIUM BLD-SCNC: 128 MMOL/L (ref 132–146)
SODIUM URINE: 21 MMOL/L
WBC # BLD: 6.5 E9/L (ref 4.5–11.5)

## 2022-07-12 PROCEDURE — 85025 COMPLETE CBC W/AUTO DIFF WBC: CPT

## 2022-07-12 PROCEDURE — 6360000002 HC RX W HCPCS: Performed by: STUDENT IN AN ORGANIZED HEALTH CARE EDUCATION/TRAINING PROGRAM

## 2022-07-12 PROCEDURE — 6370000000 HC RX 637 (ALT 250 FOR IP): Performed by: INTERNAL MEDICINE

## 2022-07-12 PROCEDURE — 83735 ASSAY OF MAGNESIUM: CPT

## 2022-07-12 PROCEDURE — 82436 ASSAY OF URINE CHLORIDE: CPT

## 2022-07-12 PROCEDURE — 2580000003 HC RX 258

## 2022-07-12 PROCEDURE — 99232 SBSQ HOSP IP/OBS MODERATE 35: CPT | Performed by: INTERNAL MEDICINE

## 2022-07-12 PROCEDURE — 2060000000 HC ICU INTERMEDIATE R&B

## 2022-07-12 PROCEDURE — 82962 GLUCOSE BLOOD TEST: CPT

## 2022-07-12 PROCEDURE — 94660 CPAP INITIATION&MGMT: CPT

## 2022-07-12 PROCEDURE — 83930 ASSAY OF BLOOD OSMOLALITY: CPT

## 2022-07-12 PROCEDURE — 6370000000 HC RX 637 (ALT 250 FOR IP): Performed by: STUDENT IN AN ORGANIZED HEALTH CARE EDUCATION/TRAINING PROGRAM

## 2022-07-12 PROCEDURE — 80048 BASIC METABOLIC PNL TOTAL CA: CPT

## 2022-07-12 PROCEDURE — 84133 ASSAY OF URINE POTASSIUM: CPT

## 2022-07-12 PROCEDURE — 94640 AIRWAY INHALATION TREATMENT: CPT

## 2022-07-12 PROCEDURE — 6370000000 HC RX 637 (ALT 250 FOR IP)

## 2022-07-12 PROCEDURE — 97161 PT EVAL LOW COMPLEX 20 MIN: CPT

## 2022-07-12 PROCEDURE — 36415 COLL VENOUS BLD VENIPUNCTURE: CPT

## 2022-07-12 PROCEDURE — 83935 ASSAY OF URINE OSMOLALITY: CPT

## 2022-07-12 PROCEDURE — 84300 ASSAY OF URINE SODIUM: CPT

## 2022-07-12 PROCEDURE — S5553 INSULIN LONG ACTING 5 U: HCPCS

## 2022-07-12 PROCEDURE — 6360000002 HC RX W HCPCS

## 2022-07-12 PROCEDURE — 84100 ASSAY OF PHOSPHORUS: CPT

## 2022-07-12 RX ORDER — INSULIN LISPRO 100 [IU]/ML
34 INJECTION, SOLUTION INTRAVENOUS; SUBCUTANEOUS
Status: DISCONTINUED | OUTPATIENT
Start: 2022-07-12 | End: 2022-07-13 | Stop reason: HOSPADM

## 2022-07-12 RX ORDER — MAGNESIUM SULFATE 1 G/100ML
1000 INJECTION INTRAVENOUS ONCE
Status: COMPLETED | OUTPATIENT
Start: 2022-07-12 | End: 2022-07-12

## 2022-07-12 RX ADMIN — APIXABAN 5 MG: 5 TABLET, FILM COATED ORAL at 10:13

## 2022-07-12 RX ADMIN — Medication 5 MG: at 21:19

## 2022-07-12 RX ADMIN — ACETAMINOPHEN 325MG 650 MG: 325 TABLET ORAL at 06:03

## 2022-07-12 RX ADMIN — INSULIN LISPRO 8 UNITS: 100 INJECTION, SOLUTION INTRAVENOUS; SUBCUTANEOUS at 13:01

## 2022-07-12 RX ADMIN — GABAPENTIN 300 MG: 300 CAPSULE ORAL at 10:13

## 2022-07-12 RX ADMIN — ATORVASTATIN CALCIUM 40 MG: 40 TABLET, FILM COATED ORAL at 21:19

## 2022-07-12 RX ADMIN — INSULIN LISPRO 1 UNITS: 100 INJECTION, SOLUTION INTRAVENOUS; SUBCUTANEOUS at 21:18

## 2022-07-12 RX ADMIN — INSULIN LISPRO 30 UNITS: 100 INJECTION, SOLUTION INTRAVENOUS; SUBCUTANEOUS at 10:09

## 2022-07-12 RX ADMIN — ASPIRIN 81 MG: 81 TABLET, COATED ORAL at 10:26

## 2022-07-12 RX ADMIN — INSULIN LISPRO 2 UNITS: 100 INJECTION, SOLUTION INTRAVENOUS; SUBCUTANEOUS at 17:23

## 2022-07-12 RX ADMIN — BUMETANIDE 2 MG: 1 TABLET ORAL at 10:13

## 2022-07-12 RX ADMIN — ARFORMOTEROL TARTRATE 15 MCG: 15 SOLUTION RESPIRATORY (INHALATION) at 13:20

## 2022-07-12 RX ADMIN — APIXABAN 5 MG: 5 TABLET, FILM COATED ORAL at 21:19

## 2022-07-12 RX ADMIN — INSULIN GLARGINE-YFGN 55 UNITS: 100 INJECTION, SOLUTION SUBCUTANEOUS at 21:17

## 2022-07-12 RX ADMIN — INSULIN LISPRO 6 UNITS: 100 INJECTION, SOLUTION INTRAVENOUS; SUBCUTANEOUS at 10:11

## 2022-07-12 RX ADMIN — BUDESONIDE 500 MCG: 0.5 SUSPENSION RESPIRATORY (INHALATION) at 13:19

## 2022-07-12 RX ADMIN — GABAPENTIN 300 MG: 300 CAPSULE ORAL at 21:19

## 2022-07-12 RX ADMIN — INSULIN LISPRO 30 UNITS: 100 INJECTION, SOLUTION INTRAVENOUS; SUBCUTANEOUS at 13:03

## 2022-07-12 RX ADMIN — Medication 1000 UNITS: at 10:14

## 2022-07-12 RX ADMIN — SODIUM CHLORIDE, PRESERVATIVE FREE 10 ML: 5 INJECTION INTRAVENOUS at 10:09

## 2022-07-12 RX ADMIN — MAGNESIUM SULFATE HEPTAHYDRATE 1000 MG: 1 INJECTION, SOLUTION INTRAVENOUS at 15:02

## 2022-07-12 RX ADMIN — INSULIN LISPRO 34 UNITS: 100 INJECTION, SOLUTION INTRAVENOUS; SUBCUTANEOUS at 17:23

## 2022-07-12 RX ADMIN — LEVOTHYROXINE SODIUM 88 MCG: 0.09 TABLET ORAL at 06:03

## 2022-07-12 RX ADMIN — SODIUM CHLORIDE, PRESERVATIVE FREE 10 ML: 5 INJECTION INTRAVENOUS at 21:19

## 2022-07-12 ASSESSMENT — PAIN SCALES - GENERAL: PAINLEVEL_OUTOF10: 7

## 2022-07-12 ASSESSMENT — PAIN - FUNCTIONAL ASSESSMENT: PAIN_FUNCTIONAL_ASSESSMENT: ACTIVITIES ARE NOT PREVENTED

## 2022-07-12 ASSESSMENT — PAIN DESCRIPTION - DESCRIPTORS: DESCRIPTORS: ACHING;DISCOMFORT

## 2022-07-12 ASSESSMENT — PAIN DESCRIPTION - ORIENTATION: ORIENTATION: LOWER;MID

## 2022-07-12 ASSESSMENT — PAIN DESCRIPTION - LOCATION: LOCATION: BACK

## 2022-07-12 NOTE — DISCHARGE INSTR - DIET
Good nutrition is important when healing from an illness, injury, or surgery. Follow any nutrition recommendations given to you during your hospital stay. If you were given an oral nutrition supplement while in the hospital, continue to take this supplement at home. You can take it with meals, in-between meals, and/or before bedtime. These supplements can be purchased at most local grocery stores, pharmacies, and chain super-stores. If you have any questions about your diet or nutrition, call the hospital and ask for the dietitian. Heart Failure Nutrition Therapy  This nutrition therapy will help you feel better and support your heart. This plan focuses on:  Limiting sodium in your diet. Salt (sodium) makes your body hold water. When your body holds too much water, you can feel shortness of breath and swelling. You can prevent these symptoms by eating less salt. Limiting fluid in your diet. For some patients, drinking too much fluid can make heart failure worse. It can cause symptoms such as shortness of breath and swelling. Limiting fluids can help relieve some of your symptoms. Managing your weight. Your registered dietitian nutritionist (RDN) can help you choose a healthy weight for your body type. You can achieve these goals by:  Reading food labels to keep track of how much sodium is in the foods you eat. Limiting foods that are high in sodium. Checking your weight to make sure you're not retaining too much fluid. Reading the Food Label: How Much Sodium Is Too Much? The nutrition plan for heart failure usually limits the sodium you get from food and drinks to 2,000 milligrams per day. Salt is the main source of sodium. Read the nutrition label to find out how much sodium is in 1 serving of a food. Select foods with 140 milligrams of sodium or less per serving. Foods with more than 300 milligrams of sodium per serving may not fit into a reduced-sodium meal plan. Check serving sizes.  If you eat more than 1 serving, you will get more sodium than the amount listed. Cutting Back on Sodium  Avoid processed foods. Eat more fresh foods. Fresh and frozen fruits and vegetables without added juices or sauces are naturally low in sodium. Fresh meats are lower in sodium than processed meats, such as vásquez, sausage, and hot dogs. Read the nutrition label or ask your  to help you find a fresh meat that is low in sodium. Eat less salt, at the table and when cooking. Just 1 teaspoon of table salt has 2,300 milligrams of sodium. Leave the salt out of recipes for pasta, casseroles, and soups. Ask your RDN how to cook your favorite recipes without sodium. Be a smart . Look for food packages that say salt-free or sodium-free.  These items contain less than 5 milligrams of sodium per serving. Very-low-sodium products contain less than 35 milligrams of sodium per serving. Low-sodium products contain less than 140 milligrams of sodium per serving. Unsalted or no added salt products may still be high in sodium. Check the nutrition label. Add flavors to your food without adding sodium. Try lemon juice, lime juice, fruit juice, or vinegar. Dry or fresh herbs add flavor. Try basil, bay leaf, dill, rosemary, parsley, dk, dry mustard, nutmeg, thyme, and paprika. Pepper, red pepper flakes, and cayenne pepper can add spice to your meals without adding sodium. Hot sauce contains sodium, but if you use just a drop or two, it will not add up to much. Buy a sodium-free seasoning blend or make your own at home. Use caution when you eat outside your home. Restaurant foods can be very high in sodium. Ask for nutrition information. Many restaurants provide nutrition facts on their menus or websites. Let your  know that you want your food to be cooked without salt. Ask for your salad dressing and sauces to come on the side.   Fluid Restriction  Your doctor may ask you to follow a fluid restriction in addition to taking diuretics (water pills). Ask your doctor how much fluid you can have. Foods that are liquid at room temperature are considered a fluid, such as popsicles, soup, ice cream, and Jell-O. Here are some common conversions that will help you measure your fluid intake every day:  1,000 milliliters = 1 liter or 4 cups 1 fluid ounce = 30 milliliters   1 cup = 240 milliliters 2,000 milliliters = 2 liters or 8 cups   1,500 milliliters = 1½ liters or 6 cups     Weight Monitoring  Weigh yourself each day. Sudden weight gain is a sign that fluid is building up in your body. Follow these guidelines:  Weigh yourself every morning. If you gain 3 or more pounds in 1-2 days or 5 or more pounds within 1 week, call your doctor. Your doctor may adjust your medicine to get rid of the extra fluid. Talk with your doctor or RDN about what a healthy weight is for you. Talk with your doctor to find out what type of physical activity is best for you.   Foods Recommended  Food Group Recommended Foods   Grains Bread with less than 80 milligrams sodium per slice (yeast breads usually have less sodium than those made with baking soda)  Homemade bread made with reduced-sodium baking soda  Many cold cereals, especially shredded wheat and puffed rice  Oats, grits, or cream of wheat  Dry pastas, noodles, quinoa, and rice   Vegetables Fresh and frozen vegetables without added sauces, salt, or sodium  Homemade soups (salt free or low sodium)  Low-sodium or sodium-free canned vegetables and soups   Fruits Fresh and canned fruits  Dried fruits, such as raisins, cranberries, and prunes   Dairy (Milk and Milk Products) Milk or milk powder  Rice milk and soy milk  Yogurt, including Greek yogurt  Small amounts of natural, block cheese or reduced-sodium cheese (Swiss, ricotta, and fresh mozzarella are lower in sodium than others)  Regular or soft cream cheese and low-sodium cottage cheese   Protein Foods (Meat, Poultry, Fish, Beans) Fresh meats and fish  [place]) vásquez (except if packaged in a sodium solution)  Canned or packed tuna (no more than 4 ounces at 1 serving)  Dried beans and peas; edamame (fresh soybeans)  Eggs or egg beaters (if  less than 200 mg per serving)  Unsalted nuts or peanut butter   Desserts and Snacks Fresh fruit or applesauce  Nolan food cake  Granola bars  Unsalted pretzels, popcorn, or nuts  Pudding or gelatin with whipped cream topping  Homemade rice-crispy treats  Vanilla wafers  Frozen fruit bars   Fats Tub or liquid margarine  Unsaturated fat oils (canola, olive, corn, sunflower, safflower, peanut)   Condiments Fresh or dried herbs; low-sodium ketchup; vinegar; lemon or lime juice; pepper; salt-free seasoning mixes and marinades (salt-free seasoning blend); simple salad dressings (vinegar and oil); salt-free sauces   Foods Not Recommended  Food Group Foods Not Recommended   Grains Breads or crackers topped with salt  Cereals (hot/cold) with more than 300 milligrams sodium per serving  Biscuits, cornbread, and other quick breads prepared with baking soda  Prepackaged bread crumbs  Self-rising flours   Vegetables Canned vegetables (unless they are salt free or low sodium)  Frozen vegetables with seasoning and sauces  Sauerkraut and pickled vegetables  Canned or dried soups (unless they are salt free or low  sodium)  [place] fries and onion rings   Fruits Dried fruits preserved with sodium-containing additives   Dairy (Milk and Milk Products) Buttermilk  Processed cheeses   Cottage cheese (unless a low-sodium variety)  Feta cheese; shredded cheese (has more sodium than block cheese); singles slices and string cheese   Protein Foods (Meat, Poultry, Fish, Beans) Cured meats: vásquez, ham, sausage, pepperoni, and hot dogs  Canned meats: chili, Autumn sausage, sardines, and ham  Smoked fish and meats  Frozen meals that have more than 600 milligrams sodium   Fats Salted butter or margarine   Condiments Salt, sea salt, kosher salt, onion salt, and garlic salt  Seasoning mixes containing salt (Lemon Pepper or Bouillon cubes)  Catsup or ketchup, BBQ sauce, Worcestershire and soy sauce  Salsa, pickles, olives, relish  Salad dressings: ranch, blue cheese, [place], and Western Nika    Alcohol Check with your doctor.    Heart Failure Sample 1-Day Menu View Nutrient Info  Breakfast 1 cup regular oatmeal made with water or milk  1 cup reduced-fat (2%) milk  1 medium banana  1 slice whole wheat bread  1 tablespoon salt-free peanut butter   Morning Snack 1/2 cup dried cranberries   Lunch 3 ounces grilled chicken breast  1 cup salad greens  Olive oil and vinegar dressing (for greens)  5 unsalted or low-sodium crackers  Fruit plate with 1/4 cup strawberries  1/2 sliced orange (for fruit plate)  1 peach half (for fruit plate)   Afternoon Snack 1 ounce low-sodium turkey  1 piece whole wheat bread   Evening Meal 3 ounces herb-baked fish  1 baked potato  2 teaspoons soft margarine (trans fat-free) (for potato)  Sliced tomatoes  1/2 cup steamed spinach drizzled with lemon juice  3-inch square of urbano food cake  Fresh strawberries (2) (for cake)   Evening Snack 2 tablespoons salt-free peanut butter  5 low-sodium crackers   Daily Sum  Nutrient Unit Value   Macronutrients   Energy kcal 1890   Energy kJ 7906   Protein g 95   Total lipid (fat) g 56   Carbohydrate, by difference g 270   Fiber, total dietary g 31   Sugars, total g 99   Minerals   Calcium, Ca mg 949   Iron, Fe mg 27   Sodium, Na mg 1538   Vitamins   Vitamin C, total ascorbic acid mg 118   Vitamin A, IU IU 60809   Vitamin D    Lipids   Fatty acids, total saturated g 13   Fatty acids, total monounsaturated g 22   Fatty acids, total polyunsaturated g 16   Cholesterol mg 126      Heart Failure Vegan Sample 1-Day Menu View Nutrient Info  Breakfast 1 cup oatmeal  ¼ cup walnuts  1 banana  1 cup soymilk fortified with calcium, vitamin B12, and vitamin D   Lunch 1 large whole wheat [place]  Salad made with: 1 cup chickpeas  1 cup lettuce  ½ cup cherry tomatoes  1 cup strawberries  1 tablespoon olive oil  1 tablespoon balsamic vinegar   Evening Meal ¾ cup tofu  2 teaspoons olive oil  Pinch garlic powder  1 baked potato  1 tablespoon margarine, soft, tub  ½ cup cooked spinach with:  Squeeze of lemon  1 cup soymilk fortified with calcium, vitamin B12, and vitamin D   Evening Snack 1 tablespoon peanut butter, without salt  ¾ ounce pretzels, without salt   Daily Sum  Nutrient Unit Value   Macronutrients   Energy kcal 1848   Energy kJ 7735   Protein g 74   Total lipid (fat) g 83   Carbohydrate, by difference g 223   Fiber, total dietary g 39   Sugars, total g 44   Minerals   Calcium, Ca mg 1325   Iron, Fe mg 20   Sodium, Na mg 1098   Vitamins   Vitamin C, total ascorbic acid mg 140   Vitamin A, IU IU 42238   Vitamin D    Lipids   Fatty acids, total saturated g 13   Fatty acids, total monounsaturated g 33   Fatty acids, total polyunsaturated g 32   Cholesterol mg 0      Heart Failure Vegetarian (Lacto-Ovo) Sample 1-Day Menu View Nutrient Info  Breakfast 1 cup oatmeal  ¼ cup walnuts  1 banana  1 cup fat-free milk   Lunch 1 large whole wheat rem[place]  Salad made with: ½ cup chickpeas  1 ounce mozzarella cheese  1 cup lettuce  ½ cup cherry tomatoes  1 cup strawberries  1 tablespoon olive oil  1 tablespoon balsamic vinegar   Evening Meal ¾ cup tofu  2 teaspoons olive oil  Pinch garlic powder  1 baked potato  1 tablespoon margarine, soft, tub  ½ cup cooked spinach with:  Squeeze of lemon  1 cup fat-free milk   Evening Snack 1 tablespoon peanut butter, without salt  1 apple   Daily Sum  Nutrient Unit Value   Macronutrients   Energy kcal 1847   Energy kJ 7734   Protein g 76   Total lipid (fat) g 79   Carbohydrate, by difference g 231   Fiber, total dietary g 35   Sugars, total g 83   Minerals   Calcium, Ca mg 1488   Iron, Fe mg 16   Sodium, Na mg 1100   Vitamins   Vitamin C, total ascorbic acid mg 149 Vitamin A, IU IU 50415   Vitamin D    Lipids   Fatty acids, total saturated g 15   Fatty acids, total monounsaturated g 32   Fatty acids, total polyunsaturated g 26   Cholesterol mg 28          If any questions/concerns please don't hesitate to reach out to either myself or primarily our out-patient dietitian @ [phone] (Desk).     Marlene Quinones RD, LD  In-Patient Clinical Dietitian  [phone] (Office)  [facility]

## 2022-07-12 NOTE — PROGRESS NOTES
Physical Therapy  Physical Therapy Initial Assessment     Name: Belen Jarquin  : 1955  MRN: [id number]      Date of Service: 2022    Evaluating PT:  Clau Mathews PT, DPT [id number]    Room #:  3453/8313-H  Diagnosis:  Acute on chronic congestive heart failure, unspecified heart failure type ([facility] [address] [I50.9]  PMHx/PSHx:  RADHA, a fib, carpal tunnel syndrome, HLD, HTN, hypotyroidism, obesity, NIKO, DM, sinus node dysfunction, OA  Procedure/Surgery:  None this admission  Precautions:  Falls  Equipment Needs:  None, pt owns cane    SUBJECTIVE:    Pt lives with wife in a 2 story home with 3 stairs to enter and 2 rail. Bed is on 2nd floor and bath is on 2nd floor -- pt sleeping in recliner on main level. Pt ambulated with cane PRN PTA. OBJECTIVE:   Initial Evaluation  Date: 22 Treatment Short Term/ Long Term   Goals   AM-PAC 6 Clicks 85/76     Was pt agreeable to Eval/treatment? yes     Does pt have pain? 8/10 LBP     Bed Mobility  Rolling: NT  Supine to sit: NT  Sit to supine: NT  Scooting: NT  Rolling: Independent   Supine to sit: Independent   Sit to supine: Independent   Scooting: Independent    Transfers Sit to stand: SBA -- with momentum  Stand to sit: SBA  Stand pivot: SBA  Sit to stand: Independent   Stand to sit:  Independent   Stand pivot: Independent vs Mod I with cane   Ambulation    250 feet with no AD SBA  >400 feet with no AD  Independent vs Mod I with cane    Stair negotiation: ascended and descended  13 steps with 1 rail Michael  13 steps with 1 rail Mod I   ROM BUE:  Defer to OT note  BLE:  WFL     Strength BUE:  Defer to OT note  BLE:  4/5  WNL   Balance Sitting EOB:  Independent   Dynamic Standing:  SBA  Sitting EOB:  Independent   Dynamic Standing:  Independent vs Mod I     Pt is A & O x 4  Sensation:  Denies abnormalities  Edema:  None noted    Vitals:  SPO2 WNL throughout on room air    Patient education  Pt educated on role of PT, safety during mobility    Patient response to education:   Pt verbalized understanding Pt demonstrated skill Pt requires further education in this area   yes yes yes     ASSESSMENT:    Conditions Requiring Skilled Therapeutic Intervention:    [x]Decreased strength     [x]Decreased ROM  [x]Decreased functional mobility  [x]Decreased balance   [x]Decreased endurance   []Decreased posture  []Decreased sensation  []Decreased coordination   []Decreased vision  [x]Decreased safety awareness   [x]Increased pain       Comments:  Patient semi-supine in bed upon entry and agreeable to PT evaluation. Pt able to complete bed mobility with no hands on assist. Pt able to stand from EOB with use of momentum after several failed attempts despite cues to allow assist. Pt able to ambulate into maldonado with close standby assist. Pt initially mildly unsteady, however, improved with distance. Standing rest x3 during ambulation for SOB with SPO2 WNL. During attempt at stair negotiation, pt experienced significant LOB during ascent requiring significant assist for safety. Pt able to continue stair negotiation with step to pattern during ascent and descent. During descent, pt used BUEs on one sided rail. Light assist for safety. Pt returned to room and left sitting EOB at end of session with all needs met. Patient to benefit from continued skilled PT at [facility] to improve functional mobility and decrease fall risk. Treatment:  Patient practiced and was instructed in the following treatment:     Transfer Training: Verbal and tactile cueing provided for sequencing and safety during mobility.  Gait Training: Ambulation with no AD and verbal cues for proper technique and safety.  Stair negotiation: 13 stairs with 1 rail and step to pattern, assist for safety    Pt's/ family goals   1. Return home    Prognosis is good for reaching above PT goals. Patient and or family understand(s) diagnosis, prognosis, and plan of care.   yes    PHYSICAL THERAPY PLAN OF CARE:    PT POC is established based on physician order and patient diagnosis     Referring provider/PT Order:    07/11/22 1500  PT eval and treat  Start:  07/11/22 1500,   End:  07/11/22 1500,   ONE TIME,   Standing Count:  1 Occurrences,   Damaris Vargas MD       Diagnosis:  Acute on chronic congestive heart failure, unspecified heart failure type ([facility] [address] [I50.9]  Specific instructions for next treatment:  Progress mobility as appropriate    Current Treatment Recommendations:     [x] Strengthening to improve independence with functional mobility   [x] ROM to improve independence with functional mobility   [x] Balance Training to improve static/dynamic balance and to reduce fall risk  [x] Endurance Training to improve activity tolerance during functional mobility   [x] Transfer Training to improve safety and independence with all functional transfers   [x] Gait Training to improve gait mechanics, endurance and assess need for appropriate assistive device  [x] Stair Training in preparation for safe discharge home and/or into the community   [] Positioning to prevent skin breakdown and contractures  [x] Safety and Education Training   [x] Patient/Caregiver Education   [] HEP  [x] Other     PT long term treatment goals are located in above grid    Frequency of treatments: 2-5x/week x 1-2 weeks. Time in  0925  Time out  0940    Total Treatment Time  0 minutes     Evaluation Time includes thorough review of current medical information, gathering information on past medical history/social history and prior level of function, completion of standardized testing/informal observation of tasks, assessment of data and education on plan of care and goals.     CPT codes:  [x] Low Complexity PT evaluation 73325  [] Moderate Complexity PT evaluation 47657  [] High Complexity PT evaluation 83900  [] PT Re-evaluation 42108  [] Gait training 94565 - minutes  [] Manual therapy 66546 - minutes  [] Therapeutic activities 77647 - minutes  [] Therapeutic exercises 85270 - minutes  [] Neuromuscular reeducation 21151 - minutes     Andrea Course PT, DPT  [id number]

## 2022-07-12 NOTE — PROGRESS NOTES
Department of Internal Medicine  Nephrology Attending Consult Note      Events reviewed. SUBJECTIVE: We are following Mr. Rocha for RADHA. Reports no complaints. PHYSICAL EXAM:      Vitals:    VITALS:  BP (!) 98/50   Pulse 61   Temp 97.5 °F (36.4 °C) (Oral)   Resp 18   Wt 222 lb 14.2 oz (101.1 kg)   SpO2 97%   BMI 34.91 kg/m²   24HR INTAKE/OUTPUT:      Intake/Output Summary (Last 24 hours) at 7/12/2022 1252  Last data filed at 7/12/2022 1157  Gross per 24 hour   Intake 600 ml   Output 1515 ml   Net -915 ml     Constitutional:  Alert, elderly, well developed, no acute distress  HEENT:  PERRLA, normocephalic, atraumtic  Respiratory:  CTAB except bibasilar rales  Cardiovascular/Edema:  S1/S2, RRR  Gastrointestinal:  Soft, rounded, non-tender, obese  Neurologic:  AxOx3  Skin:  Warm, dry, dry mucous membranes   Other:  Prince- with urine noted, trace edema    Scheduled Meds:   magnesium sulfate  1,000 mg IntraVENous Once    insulin lispro  30 Units SubCUTAneous TID WC    insulin glargine  55 Units SubCUTAneous Nightly    metoprolol succinate  25 mg Oral Daily    insulin lispro  0-12 Units SubCUTAneous TID WC    insulin lispro  0-6 Units SubCUTAneous Nightly    gabapentin  300 mg Oral BID    melatonin  5 mg Oral Nightly    bumetanide  2 mg Oral Daily    [Held by provider] bumetanide  1 mg IntraVENous Nightly    [Held by provider] sacubitril-valsartan  1 tablet Oral BID    aspirin  81 mg Oral Daily    atorvastatin  40 mg Oral Nightly    Vitamin D  1,000 Units Oral Daily    apixaban  5 mg Oral BID    levothyroxine  88 mcg Oral Daily    sodium chloride flush  5-40 mL IntraVENous 2 times per day    budesonide  0.5 mg Nebulization BID    And    Arformoterol Tartrate  15 mcg Nebulization BID     Continuous Infusions:   sodium chloride      dextrose       PRN Meds:.sodium chloride flush, sodium chloride, polyethylene glycol, acetaminophen **OR** acetaminophen, glucose, dextrose **OR** dextrose, post back surgery x2, who was admitted on July 7, 2022 after he presented to the ER reporting shortness of breath and increased lower extremity edema. His proBNP on admission was 1512. On admission his creatinine level was 1.5 mg/dL) increased to 1.7 mg/dl, reason for this consultation. Prior to admission his medications included empagliflozin. IMPRESSION/RECOMMENDATIONS:      1. RADHA on CKD, probably ischemic ATN, due to relative hypotension in the setting of ARB administration (sacubitril-valsartan). PVR only 41 cc. Renal function continues to improve, creatinine down to 2.4 mg/dL. 2. CKD stage III, without proteinuria, 2/2 nephrosclerosis and previous episodes of ischemic ATN, baseline creatinine 1.4-1.5 mg/dl  3. Hypotonic hyponatremia 2/2 decreased GFR, to continue fluid restriction and loop diuretics. 4. HTN, on metoprolol  5. HFpEF 55-60%, proBNP 1512, on Bumex  -------------------------------------------------------------  6. Atrial fibrillation, on apixaban  7. Type II DM, on insulin    8. NIKO  9. Hypothyroidism, on levothyroxine  10. Hyperlipidemia, on atorvastatin  11.  Normocytic anemia    Plan:    · Continue to monitor renal function  · Continue to monitor sodium levels  · Continue Bumex 2 mg p.o. daily  · Avoid hypotension  · Continue fluid restriction, dry tray

## 2022-07-12 NOTE — PROGRESS NOTES
[facility]  Internal Medicine Residency / [address]    Attending Physician Statement  I have discussed the case, including pertinent history and exam findings with the resident and the team.  I have seen and examined the patient and the key elements of the encounter have been performed by me. I agree with the assessment, plan and orders as documented by the resident. Case Discussed During AM Rounds     No new complaints   Low/normal BP overnight and this am   Given 250 cc bolus when dropped BP yesterday   Cr is improving this am    - 1.5 L since charting strict I/O's    Sodium remains concern with reduction to 125    Dry tray encouraged by Nephrology at this time- discussed and coordinated during rounds   Monitor for any additional low BP readings- currently asymptomatic     Acute on Chronic Diastolic Heart Failure    Reduced diuresis with increase in Cr noted since admission Nephrology following   Continue Strict I/O's   Noted reduction in weight during admission and findings on exam note improvement in hypervolemia since admission- remains stable     Hyponatremia- persistent    Serum Osm- within normal range   Relative isotonic in nature    Repeat urine studies   250 cc bolus given yesterday due to low BP   Dry tray   Monitor closely    Currently asymptomatic      RADHA on CKD Stage III    During admission- diuresis adjusted during admission    Urine output- non-oliguric    Nephrology following   Improved I/O's   Cr is trending down at this time     Type II DM- insulin requiring and complicated by Hyperglycemia    Continue insulin adjustment inpatient   Recent significant rise in creatinine   Recommend outpatient ENDO follow-up     Normocytic Anemia    H/H Stable    Disposition- continue inpatient management     Remainder of medical problems as per resident note.       Janine Joseph MD, Kelsie Rocha   Internal Medicine Residency Faculty

## 2022-07-12 NOTE — PROGRESS NOTES
[facility]  Internal Medicine Residency Program  Progress Note - House Team     Patient:  Cecilia Shelley 69 y.o. male MRN: [id number]     Date of Service: 7/12/2022     CC: Shortness of breath and bilateral extremity edema     Days since admission: 5    Subjective     Overnight events: Overnight events were significant for magnesium replacement because it was 1.9 and 250cc bolus given for soft BP 98/54 mmHg. After replacement BP increased to 100/54mmHg. This morning patient was seen and examined at bedside, didn't have any complaint. He denies CP, shortness of breath, abdominal pain, palpitations, fever, chills, nausea and vomiting. Objective     Physical Exam:  · Vitals: BP (!) 98/50   Pulse 71   Temp 97.5 °F (36.4 °C) (Oral)   Resp 24   Wt 222 lb 14.2 oz (101.1 kg)   SpO2 100%   BMI 34.91 kg/m²     I & O - 24hr:   Intake/Output Summary (Last 24 hours) at 7/12/2022 1508  Last data filed at 7/12/2022 1434  Gross per 24 hour   Intake 600 ml   Output 1870 ml   Net -1270 ml   ·    · General Appearance: alert, appears stated age and cooperative  · HEENT:  Head: Normocephalic, no lesions, without obvious abnormality. · Head: Normal, normocephalic, atraumatic. · Neck: no adenopathy, no carotid bruit, no JVD, supple, symmetrical, trachea midline and thyroid not enlarged, symmetric, no tenderness/mass/nodules  · Lung: clear to auscultation bilaterally  · Heart: regular rate and rhythm, S1, S2 normal, no murmur, click, rub or gallop  · Abdomen: soft, non-tender; bowel sounds normal; no masses,  no organomegaly, no distention   · Extremities:  Edema improved significantly compared to admission   · Musculokeletal: No joint swelling, no muscle tenderness. ROM normal in all joints of extremities.    · Neurologic: Mental status: Alert, oriented, thought content appropriate  Subject  Pertinent Information & Imaging Studies, Consults   carmela  CBC:   Lab Results   Component Value Date/Time    WBC 6.5 07/12/2022 03:00 AM    RBC 3.38 07/12/2022 03:00 AM    HGB 10.2 07/12/2022 03:00 AM    HCT 29.1 07/12/2022 03:00 AM    MCV 86.1 07/12/2022 03:00 AM    MCH 30.2 07/12/2022 03:00 AM    MCHC 35.1 07/12/2022 03:00 AM    RDW 16.3 07/12/2022 03:00 AM     07/12/2022 03:00 AM    MPV 9.5 07/12/2022 03:00 AM     BUN/Creatinine:    Lab Results   Component Value Date/Time    BUN 90 07/12/2022 02:46 PM    CREATININE 2.0 07/12/2022 02:46 PM     Hepatic Function Panel:    Lab Results   Component Value Date/Time    ALKPHOS 129 07/07/2022 11:27 AM    ALT 28 07/07/2022 11:27 AM    AST 39 07/07/2022 11:27 AM    PROT 8.7 07/07/2022 11:27 AM    BILITOT 1.0 07/07/2022 11:27 AM    BILIDIR <0.2 05/25/2017 02:00 AM    IBILI see below 05/25/2017 02:00 AM    LABALBU 4.0 07/07/2022 11:27 AM    LABALBU 4.4 10/25/2011 09:50 AM     Magnesium:    Lab Results   Component Value Date/Time    MG 1.9 07/12/2022 03:00 AM     Phosphorus:    Lab Results   Component Value Date/Time    PHOS 6.2 07/12/2022 03:00 AM     HgBA1c:    Lab Results   Component Value Date/Time    LABA1C 11.0 06/23/2022 08:40 AM     TSH:    Lab Results   Component Value Date/Time    TSH 21.630 07/08/2022 06:12 AM       IMAGING:   Imaging Studies:    XR CHEST (2 VW)    Result Date: 7/7/2022  Cardiomegaly and pulmonary vascular congestion/early CHF.             PROCEDURES: None    FLUIDS: Restricted due to acute on chronic heart failure       Notable Cultures:      Blood cultures   Blood Culture, Routine   Date Value Ref Range Status   09/17/2021 5 Days no growth  Final     Respiratory cultures No results found for: RESPCULTURE   Gram Stain Result   Date Value Ref Range Status   05/29/2017   Final    Gram stain performed on unspun fluidPolymorphonuclear leukocytes not seenEpithelial cells not seenRare Erythrocytes     Urine   Urine Culture, Routine   Date Value Ref Range Status   05/25/2017 Growth not present  Final     Legionella No results found for: LABLEGI  C Diff PCR No results found for: CDIFPCR  Wound culture/abscess: No results for input(s): WNDABS in the last 72 hours. Tip culture:No results for input(s): CXCATHTIP in the last 72 hours. Antibiotic  Days  Day started                                  OXYGENATION: None    DIET: Low salt diet         Resident's Assessment and Plan     Romelia Garnica is a 69 y.o. male with  has a past medical history of RADHA (acute kidney injury) ([address] Atrial fibrillation ([address] Carpal tunnel syndrome, Encounter regarding vascular access for dialysis for ESRD ([facility] [address] Hyperlipidemia, Hypertension, Hypothyroidism, Lung nodule, Nocturnal hypoxemia due to obesity, Obesity, NIKO (obstructive sleep apnea), Osteoarthritis, Pinched nerve, Restrictive lung disease secondary to obesity, Sinus node dysfunction ([address] and Type II or unspecified type diabetes mellitus without mention of complication, not stated as uncontrolled. came here with CC   Chief Complaint   Patient presents with    Shortness of Breath     hx CHF, sent from clinic because of fluid overload    Leg Swelling        SUMMARY [facility]: Briefly, Mr. Shobha Omer is a 68 y/o M who was admitted 5 days ago due to bilateral lower extremity edema and shortness of breath. He was diagnosed with acute on chronic heart failure exacerbation. Patient has a history of HFpEF with last echo done in 2021 and reported EF of 55-60%. Currently being treated with bumex 2mg oral daily, metoproolol succinate 25mg daily, insulin lispro and glargine for glycemic control and apixaban for a-fib anticoagulation. Days since admission: 07/07/2022     Consults:    Nephrology     Assessment:  1.  Acute on chronic diastolic heart failure (EF 55-60%)- Resolved         History of HFpEF diagnosed by echo in 2021         Admitted for shortness of [place] and bilateral pitting edema         Pro-BNP on admisssion was 1512, previous BNP was 3800            Plan       Monitor fluid status        BMP q24h        Follow urine output         Strict I/Os        Fluid restriction        Daily weight        Pro-BNP ordered today     2. Isotonic Hyponatremia        Serum sodium has been around 120s over the last 3 days         Most recent 125          Plan        BMP q24h        Serum osmolality within normal range     3. Hyperglycemia in the setting of uncontrolled IDDM        Hx of IDDM        Recent          Refer to endocrinology if BG continues to increase               Plan       MDSS, ACHS, POCT        Cortisol was ordered today     4. RADHA stage II on CKD stage 3 ( baseline 1.2-1.3)        Serum creatinine on admission peaked 2.8         Creatinine trending down 2.4 trending down from 3.0 yesterday         Plan      Continue to monitor BMP q24h       Follow nephrology recs       Urine studies          5. Hx of paoxysmal a-fib       Metoprolol succinate 25mg oral daily       Apixaban 5mg oral BID        Plan      Continue current care     6. Hx of HLD       Continue atorvastain    7. Hx of NIKO       CPAP at night if patient not tolerate use NC     8. Hx of hypothyroidism       Last TSH was 2.950 in March 2022           Plan     Continue Synthroid       Problems resolved during this admission:    Acute on chronic heart failure exacerbation       PT/OT: None  DVT ppx: Apixaban 5mg BID   GI ppx: None         Code Status: Full code     Disposition: Continue current care    Miranda Freire MD, PGY-1  Attending physician: Dr. Checo Marques MD, FACP       Senior Resident Addendum:  I have seen and examined the patient with the intern. I have discussed the case, including pertinent history and exam findings with the intern. I agree with the assessment, plan and orders as documented above with the following additions:    Patient seen and examined at bedside this morning in no acute distress. No active complaints at this time. Na levels still low but improving. Creatinine also noted to be improving. BP still borderline.  Noted to have  Good urine output. 1. Acute exacerbation of HFpEF (EF 55-60% in 2021)  2. Prolonged QTc, QTc 515 on admission  3. Elevated troponin 2/2 CKD, chronically elevated, usually at the 40s  4. Hyperglycemia in the setting of uncontrolled IDDM,  on admission; continues to be uncontrolled in the 200s overnighit  5. RADHA Stage III on CKD Stage, likely prerenal in the setting of acute exacerbation of HFpEF, cardiorenal syndrome vs ischemic ATN in the setting of hypotension (baseline 1.1-1.3) - sCr 1.5 on admission -> peaked at 3.2 today  6. Hypovolemic isotonic hyponatremia likely 2/2 decreased GFR- Na 125 this AM, serum osm 303.  7. Hypokalemia, likely 2/2 diuresis -resolved  8. Hx of sinus node dysfunction s/p pacemaker placement   9. Hx of IDDM - on 60u basaglar and 10u premeals and SS at home  10. Hx of paroxysmal Afib, on metoprolol and eliquis  11. Hx of HLD, on metoprolol  12. Hx of NIKO/OHS, on 4lpm at night; not using CPAP  13. Hx of hypothyroidism. On synthroid; YCK 19.89 this admission     Plan:  · Nephrology consulted for fluid management and RADHA, appreciate recommendations  · Entresto held per nephro  · Consulted CHF nurse. Patient needs to follow up at CHF clinic after discharge  · Continue bumex 2 mg PO daily  · Monitor daily weight and UOP  · Strict I/O. Please chart patient's I/O  · Monitor BP  · Monitor renal function and electrolytes  · Repeat BMP this PM  · Continue fluid restriction, dry tray  · Check cortisol and proBNP in AM  · Pulmonology consulted, appreciate recommendations  · Continue CPAP at night or oxygen at night  · Continue Pulmicort, Brovana and Proventil PRN  · Plan for discharge on dulera  · Continue lantus 55u and MDSS  · Increase premeals to 34u  · Monitor BG AC/HS  · Hypoglycemia protocol in place  · Will need Endo follow-up as outpatient  · Continue metoprolol with holding parameters and eliquis.   · Continue synthroid 88mcg daily      Saulo El MD PGY-2  7/12/2022  6:58 PM      NOTE: This report was transcribed using voice recognition software. Every effort was made to ensure accuracy; however, inadvertent computerized transcription errors may be present.

## 2022-07-12 NOTE — CARE COORDINATION
7/12/2022 - Updated CM note - Pulmonology and nephrology following. Na 125, Cr 2.4, hgb 10.2 today. On room air. PO bumex twice a day. Discharge plan is home when medically stable. His wife will provide transportation home. SW/CM will follow.

## 2022-07-12 NOTE — PROGRESS NOTES
Nutrition Education    Type and Reason for Visit: Patient Education    Nutrition Assessment:  Pt to be educated per CHF LOS protocol. Chart reviewed. Pt provided w/ written edu on the Cardiac Diet which includes main diet guidelines, sample meal plan and ways to reduce sodium intake. Handout and contact info placed in d/c instructions. Will follow-up per policy.     Antony Hernandez RD, LD  Contact: ext [phone]

## 2022-07-12 NOTE — PROGRESS NOTES
[address] [facility]  Department of Internal Medicine  Division of Pulmonary, Critical Care and Sleep Medicine  Progress Note      Today's Date: 7/12/2022    Patient Name: Cuauhtemoc Cm    Date of admission: 7/7/2022 11:25 AM    Patient's age: 69 y.o., 1955    Admission Dx: Acute on chronic congestive heart failure, unspecified heart failure type ([address] [I50.9]    REASON FOR CONSULT:  NIKO    Requesting Physician: Yann Hager., DO  SUBJECTIVE:    Cr noted  No c/o  Urine studies noted      PHYSICAL EXAMINATION:   Vitals:    07/11/22 1825 07/11/22 2041 07/12/22 0900 07/12/22 1320   BP: (!) 98/54 (!) 100/54 (!) 98/50    Pulse:  64 61 71   Resp:  18 18 24   Temp:  97.3 °F (36.3 °C) 97.5 °F (36.4 °C)    TempSrc:  Oral Oral    SpO2:  96% 97% 100%   Weight:         Constitutional: A morbid obese  69 y.o. male who is alert, oriented, cooperative and in no apparent distress. Head was normocephalic and atraumatic. EENT: EOMI ESTRELLA. MMM. No icterus. No conjunctival injections. Septum was midline, mucosa was without erythema, exudates or cobblestoning. No thrush. Neck: Supple without thyromegaly. No elevated JVP. Trachea was midline. No carotid bruits. Respiratory: Symmetrical without dullness to percussion. Faint crackles on right. No wheezes, rhonchi. No intercostal retraction or use of accessory muscles. No egophony. Cardiovascular: Nonpalpable PMI. Regular without murmur, clicks, gallops or rubs. No left or right ventricular heave. Pacer in situ  Pulses:  Carotid, radial and femoral pulses were equal bilaterally. Abdomen: Obese, rounded and soft without organomegaly. No rebound, rigidity. Lymphatic: No lymphadenopathy. Musculoskeletal: Ambulates without assistance. Flattened lordotic curvature of the spine. No involuntary movements. Extremities: Tremor noted bilateral hands. Trace lower extremity edema.  No ulcerations, tenderness. Noted varicosities & venous statis/ erythema. Sensory function sensation is very diminished. Skin:  Warm and dry. Poor skin color, turgor. No bruises or skin rashes. Old surgical scars are noted. Back surgery scar. Neurological/Psychiatric: General behavior is normal. Memory is slightly impair on long term recall. Emotional status is normal.  Cranial nerves II-XII are intact. DATA:   The data collected below information that was obtained, reviewed, analyzed and interpreted today. Spirometry was compared to previous test if available and demonstrates an FVC of 2.34 liters which is 62 % of predicted with an FEV1 of 2.04 liters which is 71 % of predicted. FEV1/FVC ratio is 87 %. Mid expiratory flow rates are 95 % of predicted. Maximum voluntary ventilation is 75 liters per minute or 58 % of predicted. Flow volume loop shows no signs of intrathoracic or extrathoracic process. Impression: Moderate Restrictive Pathology    Static lung volumes (2015)  demonstrate an ERV of 0.16 liters which is 15% predicted, TGV of 2.34 liters which is 71% predicted, RV which is 2.18 liters which is 103% predicted, TLC of 4.72 liters which is 74% predicted. RV/TLC ratio is 140% predicted. DLCO is 95% predicted with a DL/VA of 94% predicted when corrected for alveolar ventilation. ECHOCARDIOGRAM: (2021):  Left ventricle is normal in size . Micro-bubble contrast injected to enhance left ventricular visualization. No regional wall motion abnormalities seen. Normal left ventricular ejection fraction. Ejection fraction is visually estimated at 55-60%. Indeterminate diastolic function. The left atrium is mildly dilated. Mildly dilated right ventricle. CT scan Right ventricle global systolic function is low normal.  The aortic valve appears moderately sclerotic.     CT SCAN CHEST 9/2021: Intervally new small to moderate left and trace right pleural effusions.   Mild thickening of secondary interlobular septa with ground-glass attenuation at some levels, compatible with an element of interstitial pulmonary edema. Patchy mild subsegmental atelectasis bilaterally, worse within the basal left lower lobe and inferior lingula. Multiple essentially stable pulmonary nodules bilaterally. Intervally new small volume ascites and flank edema. Mild upper abdominal lymphadenopathy. CT SCAN CHEST (2/2019):  LUNGS: The tiny lung nodules which were previously present are stable. No pleural effusion or pneumothorax is seen. HEART: Unremarkable. AORTA: The aorta appears to be unremarkable. MEDIASTINUM: The mediastinum nonspecific adenopathy noted most pronounced in the sub-subcarinal space. UPPER ABDOMEN: Unremarkable. OTHER:Unremarkable    CT SCAN CHEST (12/2017): Airways appear patent. A few chronic bands of atelectasis or fibrosis in each lower lobe. Solid noncalcified subpleural nodule posterior lateral aspect right upper lobe at the level the yecenia measures about 3.8 mm whereas previously it measured about 3.1 mm. Other smaller peripheral nodular densities in the right upper lobe are stable. Peripheral solid nodule laterally left upper lobe at the level the yecenia measuring about 3.7 mm is unchanged. CT SCAN CHEST (7/2016): We reviewed the films during the inter-disciplinary rounds/conference, which showed mild cardiomegaly with mediastinal lipomatosis and nonspecific lymph nodes in the mediastinum. 2. 4 mm noncalcified nodules in the upper lobe bilaterally which are unchanged since 2012. CT SCAN CHEST (7/2015): IMPRESSION: 1. Indeterminate pulmonary nodules are stable back to August 22,2012. No evidence for new pulmonary nodule, mass, or lymphadenopathy. 2. No acute pleural-parenchymal disease. 3. Borderline heart size. SLEEP STUDY: (2013)  IMPRESSION: 1. Known sleep apnea. 2. Nocturnal hypoxemia. 3. 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 4. No cardiac dysrhythmia.         IMPRESSION:    Tawana Valdes is a 69 y.o. male who is

## 2022-07-13 VITALS
RESPIRATION RATE: 18 BRPM | SYSTOLIC BLOOD PRESSURE: 118 MMHG | OXYGEN SATURATION: 99 % | HEART RATE: 61 BPM | WEIGHT: 225.3 LBS | BODY MASS INDEX: 35.29 KG/M2 | DIASTOLIC BLOOD PRESSURE: 55 MMHG | TEMPERATURE: 98.1 F

## 2022-07-13 LAB
ANION GAP SERPL CALCULATED.3IONS-SCNC: 14 MMOL/L (ref 7–16)
ANION GAP SERPL CALCULATED.3IONS-SCNC: 16 MMOL/L (ref 7–16)
BASOPHILS ABSOLUTE: 0.07 E9/L (ref 0–0.2)
BASOPHILS RELATIVE PERCENT: 1.1 % (ref 0–2)
BUN BLDV-MCNC: 83 MG/DL (ref 6–23)
BUN BLDV-MCNC: 90 MG/DL (ref 6–23)
CALCIUM SERPL-MCNC: 9.7 MG/DL (ref 8.6–10.2)
CALCIUM SERPL-MCNC: 9.9 MG/DL (ref 8.6–10.2)
CHLORIDE BLD-SCNC: 89 MMOL/L (ref 98–107)
CHLORIDE BLD-SCNC: 90 MMOL/L (ref 98–107)
CO2: 26 MMOL/L (ref 22–29)
CO2: 30 MMOL/L (ref 22–29)
CORTISOL TOTAL: 11.8 MCG/DL (ref 2.68–18.4)
CREAT SERPL-MCNC: 1.8 MG/DL (ref 0.7–1.2)
CREAT SERPL-MCNC: 1.8 MG/DL (ref 0.7–1.2)
EOSINOPHILS ABSOLUTE: 0.3 E9/L (ref 0.05–0.5)
EOSINOPHILS RELATIVE PERCENT: 4.8 % (ref 0–6)
GFR AFRICAN AMERICAN: 45
GFR AFRICAN AMERICAN: 45
GFR NON-AFRICAN AMERICAN: 38 ML/MIN/1.73
GFR NON-AFRICAN AMERICAN: 38 ML/MIN/1.73
GLUCOSE BLD-MCNC: 152 MG/DL (ref 74–99)
GLUCOSE BLD-MCNC: 285 MG/DL (ref 74–99)
HCT VFR BLD CALC: 32.8 % (ref 37–54)
HEMOGLOBIN: 11.3 G/DL (ref 12.5–16.5)
IMMATURE GRANULOCYTES #: 0.11 E9/L
IMMATURE GRANULOCYTES %: 1.8 % (ref 0–5)
LYMPHOCYTES ABSOLUTE: 1.18 E9/L (ref 1.5–4)
LYMPHOCYTES RELATIVE PERCENT: 19 % (ref 20–42)
MAGNESIUM: 2 MG/DL (ref 1.6–2.6)
MCH RBC QN AUTO: 30.4 PG (ref 26–35)
MCHC RBC AUTO-ENTMCNC: 34.5 % (ref 32–34.5)
MCV RBC AUTO: 88.2 FL (ref 80–99.9)
METER GLUCOSE: 269 MG/DL (ref 74–99)
MONOCYTES ABSOLUTE: 0.7 E9/L (ref 0.1–0.95)
MONOCYTES RELATIVE PERCENT: 11.3 % (ref 2–12)
NEUTROPHILS ABSOLUTE: 3.85 E9/L (ref 1.8–7.3)
NEUTROPHILS RELATIVE PERCENT: 62 % (ref 43–80)
PDW BLD-RTO: 16.3 FL (ref 11.5–15)
PHOSPHORUS: 5.8 MG/DL (ref 2.5–4.5)
PLATELET # BLD: 204 E9/L (ref 130–450)
PMV BLD AUTO: 9.6 FL (ref 7–12)
POTASSIUM SERPL-SCNC: 4.2 MMOL/L (ref 3.5–5)
POTASSIUM SERPL-SCNC: 4.3 MMOL/L (ref 3.5–5)
PRO-BNP: 764 PG/ML (ref 0–125)
RBC # BLD: 3.72 E12/L (ref 3.8–5.8)
SODIUM BLD-SCNC: 132 MMOL/L (ref 132–146)
SODIUM BLD-SCNC: 133 MMOL/L (ref 132–146)
WBC # BLD: 6.2 E9/L (ref 4.5–11.5)

## 2022-07-13 PROCEDURE — 83880 ASSAY OF NATRIURETIC PEPTIDE: CPT

## 2022-07-13 PROCEDURE — 94660 CPAP INITIATION&MGMT: CPT

## 2022-07-13 PROCEDURE — 94640 AIRWAY INHALATION TREATMENT: CPT

## 2022-07-13 PROCEDURE — 6360000002 HC RX W HCPCS

## 2022-07-13 PROCEDURE — 82533 TOTAL CORTISOL: CPT

## 2022-07-13 PROCEDURE — 6370000000 HC RX 637 (ALT 250 FOR IP): Performed by: INTERNAL MEDICINE

## 2022-07-13 PROCEDURE — 36415 COLL VENOUS BLD VENIPUNCTURE: CPT

## 2022-07-13 PROCEDURE — 80048 BASIC METABOLIC PNL TOTAL CA: CPT

## 2022-07-13 PROCEDURE — 83735 ASSAY OF MAGNESIUM: CPT

## 2022-07-13 PROCEDURE — 99238 HOSP IP/OBS DSCHRG MGMT 30/<: CPT | Performed by: INTERNAL MEDICINE

## 2022-07-13 PROCEDURE — 2580000003 HC RX 258

## 2022-07-13 PROCEDURE — 82962 GLUCOSE BLOOD TEST: CPT

## 2022-07-13 PROCEDURE — 6370000000 HC RX 637 (ALT 250 FOR IP)

## 2022-07-13 PROCEDURE — 6370000000 HC RX 637 (ALT 250 FOR IP): Performed by: STUDENT IN AN ORGANIZED HEALTH CARE EDUCATION/TRAINING PROGRAM

## 2022-07-13 PROCEDURE — 97535 SELF CARE MNGMENT TRAINING: CPT

## 2022-07-13 PROCEDURE — 85025 COMPLETE CBC W/AUTO DIFF WBC: CPT

## 2022-07-13 PROCEDURE — 84100 ASSAY OF PHOSPHORUS: CPT

## 2022-07-13 RX ORDER — PEN NEEDLE, DIABETIC 31 G X1/4"
NEEDLE, DISPOSABLE MISCELLANEOUS
Qty: 100 EACH | Refills: 3 | Status: SHIPPED | OUTPATIENT
Start: 2022-07-13

## 2022-07-13 RX ORDER — BUMETANIDE 2 MG/1
2 TABLET ORAL DAILY
Qty: 30 TABLET | Refills: 3 | Status: SHIPPED | OUTPATIENT
Start: 2022-07-14 | End: 2022-08-31 | Stop reason: SDUPTHER

## 2022-07-13 RX ORDER — INSULIN GLARGINE 100 [IU]/ML
55 INJECTION, SOLUTION SUBCUTANEOUS NIGHTLY
Qty: 15 PEN | Refills: 3 | Status: SHIPPED | OUTPATIENT
Start: 2022-07-13 | End: 2022-07-13 | Stop reason: HOSPADM

## 2022-07-13 RX ORDER — INSULIN LISPRO 100 [IU]/ML
20 INJECTION, SOLUTION INTRAVENOUS; SUBCUTANEOUS
Qty: 27 ML | Refills: 1 | Status: SHIPPED | OUTPATIENT
Start: 2022-07-13 | End: 2022-07-18 | Stop reason: SDUPTHER

## 2022-07-13 RX ORDER — INSULIN GLARGINE 100 [IU]/ML
55 INJECTION, SOLUTION SUBCUTANEOUS NIGHTLY
Qty: 15 PEN | Refills: 3 | Status: SHIPPED | OUTPATIENT
Start: 2022-07-13

## 2022-07-13 RX ADMIN — INSULIN LISPRO 34 UNITS: 100 INJECTION, SOLUTION INTRAVENOUS; SUBCUTANEOUS at 17:25

## 2022-07-13 RX ADMIN — METOPROLOL SUCCINATE 25 MG: 25 TABLET, EXTENDED RELEASE ORAL at 09:51

## 2022-07-13 RX ADMIN — ACETAMINOPHEN 325MG 650 MG: 325 TABLET ORAL at 03:20

## 2022-07-13 RX ADMIN — APIXABAN 5 MG: 5 TABLET, FILM COATED ORAL at 09:51

## 2022-07-13 RX ADMIN — INSULIN LISPRO 2 UNITS: 100 INJECTION, SOLUTION INTRAVENOUS; SUBCUTANEOUS at 09:54

## 2022-07-13 RX ADMIN — LEVOTHYROXINE SODIUM 88 MCG: 0.09 TABLET ORAL at 05:44

## 2022-07-13 RX ADMIN — BUDESONIDE 500 MCG: 0.5 SUSPENSION RESPIRATORY (INHALATION) at 07:44

## 2022-07-13 RX ADMIN — SODIUM CHLORIDE, PRESERVATIVE FREE 10 ML: 5 INJECTION INTRAVENOUS at 09:52

## 2022-07-13 RX ADMIN — BUMETANIDE 2 MG: 1 TABLET ORAL at 09:51

## 2022-07-13 RX ADMIN — INSULIN LISPRO 6 UNITS: 100 INJECTION, SOLUTION INTRAVENOUS; SUBCUTANEOUS at 13:07

## 2022-07-13 RX ADMIN — ARFORMOTEROL TARTRATE 15 MCG: 15 SOLUTION RESPIRATORY (INHALATION) at 07:44

## 2022-07-13 RX ADMIN — ASPIRIN 81 MG: 81 TABLET, COATED ORAL at 09:51

## 2022-07-13 RX ADMIN — GABAPENTIN 300 MG: 300 CAPSULE ORAL at 09:51

## 2022-07-13 RX ADMIN — INSULIN LISPRO 34 UNITS: 100 INJECTION, SOLUTION INTRAVENOUS; SUBCUTANEOUS at 13:09

## 2022-07-13 RX ADMIN — INSULIN LISPRO 34 UNITS: 100 INJECTION, SOLUTION INTRAVENOUS; SUBCUTANEOUS at 10:00

## 2022-07-13 RX ADMIN — Medication 1000 UNITS: at 09:51

## 2022-07-13 ASSESSMENT — PAIN SCALES - GENERAL
PAINLEVEL_OUTOF10: 0
PAINLEVEL_OUTOF10: 9
PAINLEVEL_OUTOF10: 0

## 2022-07-13 ASSESSMENT — ENCOUNTER SYMPTOMS
COUGH: 0
BLOOD IN STOOL: 0
NAUSEA: 0
DIARRHEA: 0
SHORTNESS OF BREATH: 0
CONSTIPATION: 0
VOMITING: 0
BACK PAIN: 0
ABDOMINAL PAIN: 0

## 2022-07-13 ASSESSMENT — PAIN DESCRIPTION - LOCATION: LOCATION: HEAD;NECK

## 2022-07-13 ASSESSMENT — PAIN DESCRIPTION - DESCRIPTORS: DESCRIPTORS: ACHING;DISCOMFORT;THROBBING

## 2022-07-13 NOTE — PROGRESS NOTES
Department of Internal Medicine  Nephrology Attending Consult Note      Events reviewed. SUBJECTIVE: We are following Mr. Rocha for RADHA. Reports no complaints. PHYSICAL EXAM:      Vitals:    VITALS:  BP (!) 118/55   Pulse 61   Temp 98.1 °F (36.7 °C) (Oral)   Resp 18   Wt 225 lb 4.8 oz (102.2 kg)   SpO2 99%   BMI 35.29 kg/m²   24HR INTAKE/OUTPUT:      Intake/Output Summary (Last 24 hours) at 7/13/2022 1129  Last data filed at 7/13/2022 0910  Gross per 24 hour   Intake 960 ml   Output 1380 ml   Net -420 ml     Constitutional:  Alert, elderly, well developed, no acute distress  HEENT:  PERRLA, normocephalic, atraumtic  Respiratory:  CTAB except bibasilar rales  Cardiovascular/Edema:  S1/S2, RRR  Gastrointestinal:  Soft, rounded, non-tender, obese  Neurologic:  AxOx3  Skin:  Warm, dry, dry mucous membranes   Other:  Prince- with urine noted, trace edema    Scheduled Meds:   insulin lispro  34 Units SubCUTAneous TID WC    insulin glargine  55 Units SubCUTAneous Nightly    metoprolol succinate  25 mg Oral Daily    insulin lispro  0-12 Units SubCUTAneous TID WC    insulin lispro  0-6 Units SubCUTAneous Nightly    gabapentin  300 mg Oral BID    melatonin  5 mg Oral Nightly    bumetanide  2 mg Oral Daily    [Held by provider] bumetanide  1 mg IntraVENous Nightly    [Held by provider] sacubitril-valsartan  1 tablet Oral BID    aspirin  81 mg Oral Daily    atorvastatin  40 mg Oral Nightly    Vitamin D  1,000 Units Oral Daily    apixaban  5 mg Oral BID    levothyroxine  88 mcg Oral Daily    sodium chloride flush  5-40 mL IntraVENous 2 times per day    budesonide  0.5 mg Nebulization BID    And    Arformoterol Tartrate  15 mcg Nebulization BID     Continuous Infusions:   sodium chloride      dextrose       PRN Meds:.sodium chloride flush, sodium chloride, polyethylene glycol, acetaminophen **OR** acetaminophen, glucose, dextrose **OR** dextrose, glucagon (rDNA), dextrose, albuterol, trimethobenzamide      DATA:    CBC:   Lab Results   Component Value Date/Time    WBC 6.2 07/13/2022 04:54 AM    RBC 3.72 07/13/2022 04:54 AM    HGB 11.3 07/13/2022 04:54 AM    HCT 32.8 07/13/2022 04:54 AM    MCV 88.2 07/13/2022 04:54 AM    MCH 30.4 07/13/2022 04:54 AM    MCHC 34.5 07/13/2022 04:54 AM    RDW 16.3 07/13/2022 04:54 AM     07/13/2022 04:54 AM    MPV 9.6 07/13/2022 04:54 AM     CMP:    Lab Results   Component Value Date/Time     07/13/2022 04:54 AM    K 4.3 07/13/2022 04:54 AM    K 5.0 03/10/2022 04:28 PM    CL 90 07/13/2022 04:54 AM    CO2 26 07/13/2022 04:54 AM    BUN 90 07/13/2022 04:54 AM    CREATININE 1.8 07/13/2022 04:54 AM    GFRAA 45 07/13/2022 04:54 AM    LABGLOM 38 07/13/2022 04:54 AM    GLUCOSE 152 07/13/2022 04:54 AM    GLUCOSE 133 04/18/2012 08:43 AM    PROT 8.7 07/07/2022 11:27 AM    LABALBU 4.0 07/07/2022 11:27 AM    LABALBU 4.4 10/25/2011 09:50 AM    CALCIUM 9.7 07/13/2022 04:54 AM    BILITOT 1.0 07/07/2022 11:27 AM    ALKPHOS 129 07/07/2022 11:27 AM    AST 39 07/07/2022 11:27 AM    ALT 28 07/07/2022 11:27 AM     Magnesium:    Lab Results   Component Value Date/Time    MG 2.0 07/13/2022 04:54 AM     Phosphorus:    Lab Results   Component Value Date/Time    PHOS 5.8 07/13/2022 04:54 AM     Radiology review:    Chest x-ray July 7, 2022   Cardiomegaly and pulmonary vascular congestion/early CHF.             BRIEF SUMMARY OF INITIAL CONSULT:    Briefly Mr. Rocha is a 68-year-old  man with history of stage III CKD, probably due to nephrosclerosis and previous episode of ischemic ATN for which he required temporary HD in May 2017, with recurrent episode of ischemic ATN on March 2022 for which she once again required hemodialysis x2, with fair recovery of renal function, baseline creatinine 1.4-1.5 mg/dL, HTN, DM Type II, atrial fibrillation on apixaban, HFpEF 55-60%, NIKO, pacemaker placement, hypothyroidism, hyperlipidemia, status post back surgery x2, who was admitted on July 7, 2022 after he presented to the ER reporting shortness of breath and increased lower extremity edema. His proBNP on admission was 1512. On admission his creatinine level was 1.5 mg/dL) increased to 1.7 mg/dl, reason for this consultation. Prior to admission his medications included empagliflozin. IMPRESSION/RECOMMENDATIONS:      1. RADHA on CKD, probably ischemic ATN, due to relative hypotension in the setting of ARB administration (sacubitril-valsartan). PVR only 41 cc. Renal function continues to improve, creatinine down to 1.8 mg/dL. 2. CKD stage III, without proteinuria, 2/2 nephrosclerosis and previous episodes of ischemic ATN, baseline creatinine 1.4-1.5 mg/dl  3. Hypotonic hyponatremia 2/2 decreased GFR, to continue fluid restriction and loop diuretics. 4. HTN, on metoprolol  5. HFpEF 55-60%, proBNP 1512>>764, on Bumex  -------------------------------------------------------------  6. Atrial fibrillation, on apixaban  7. Type II DM, on insulin    8. NIKO  9. Hypothyroidism, on levothyroxine  10. Hyperlipidemia, on atorvastatin  11. Normocytic anemia    Plan:    · Continue to monitor renal function  · Continue to monitor sodium levels  · Continue Bumex 2 mg p.o. daily  · Avoid hypotension  · Continue fluid restriction, 1L fluid restriction  · Repeat BMP 2pm, if renal function the same or better he is OK for discharge. · Follow up in our office in 2 weeks.  BMP weekly X 2wks    Electronically signed by JACQUELINE Correa CNP on 7/13/2022 at 11:32 AM

## 2022-07-13 NOTE — CARE COORDINATION
7/13/2022 - Updated CM note - Nephrology and pulmonology following. On room air. Cr 2.0, Phos 5.8,  today. PO bumex daily. 1L fluid restriction. Plan is to discharge home when medically ready. His wife will provide ride home. SW/CM will follow.

## 2022-07-13 NOTE — PROGRESS NOTES
OCCUPATIONAL THERAPY TREATMENT SESSION    [facility] [address] [address] [address]      Date:2022                                                  Patient Name: Danilo Negro  MRN: [id number]  : 1955  Room: [address] [place] [address]    Evaluating OT: GERMÁN Gomez, OTR/L  # [id number]    Referring Provider:  Myrtle Osborne MD  Specific Provider Orders:  David Woods and Treat\"  7-10-22    Diagnosis: Acute on chronic congestive heart failure, unspecified heart failure type ([address] [I50.9]    Pt was admitted w/ Volume Overload, Eliu LE swelling    Pertinent Medical History:  Pt has a past medical history of RADHA (acute kidney injury) ([address] Atrial fibrillation ([address] Carpal tunnel syndrome, Encounter regarding vascular access for dialysis for ESRD ([address] Hyperlipidemia, Hypertension, Hypothyroidism, Lung nodule, Nocturnal hypoxemia due to obesity, Obesity, NIKO (obstructive sleep apnea), Osteoarthritis, Pinched nerve, Restrictive lung disease secondary to obesity, Sinus node dysfunction ([address] and Type II or unspecified type diabetes mellitus without mention of complication, not stated as uncontrolled. ,  has a past surgical history that includes Colonoscopy (); back surgery (); eye surgery; hernia repair; Colonoscopy (2012); back surgery (2017); pacemaker placement (10/03/2017); Rotator cuff repair (Right); Colonoscopy (2022); and Colonoscopy (N/A, 2022).     Surgeries this admission: None     Precautions:  Fall Risk      Assessment of current deficits   [x] Functional mobility   [x]ADLs  [x] Strength               [x]Cognition   [x] Functional transfers   [x] IADLs         [x] Safety Awareness   [x]Endurance   [] Fine Coordination              [x] Balance      [] Vision/perception   []Sensation    []Gross Motor Coordination  [] ROM  [] Delirium                   [] Motor Control       OT PLAN OF CARE   OT POC based on physician orders, patient diagnosis and results of clinical assessment    Frequency/Duration 1-3 days/wk for 2 weeks PRN   Specific OT Treatment to include:     * Instruction/training on adapted ADL techniques and AE recommendations to increase functional independence within precautions       * Training on energy conservation strategies, correct breathing pattern and techniques to improve independence/tolerance for self-care routine  * Functional transfer/mobility training/DME recommendations for increased independence, safety, and fall prevention  * Patient/Family education to increase follow through with safety techniques and functional independence  * Recommendation of environmental modifications for increased safety with functional transfers/mobility and ADLs  * Cognitive retraining/development of therapeutic activities to improve problem solving, judgement, memory, and attention for increased safety/participation in ADL/IADL tasks  * Therapeutic exercise to improve motor endurance, ROM, and functional strength for ADLs/functional transfers  * Therapeutic activities to facilitate/challenge dynamic balance, stand tolerance for increased safety and independence with ADLs  * Therapeutic activities to facilitate gross/fine motor skills for increased independence with ADLs  * Neuro-muscular re-education: facilitation of righting/equilibrium reactions  * Positioning to improve skin integrity, interaction with environment and functional independence  * Delirium prevention/treatment  * Manual techniques for edema management  Other:    Recommended Adaptive Equipment: TBD as pt progresses - Adaptive equipment      Home Living:  Pt lives with his wife in a 2-story house. Sleeps in a Recliner on the main floor. Full Bath on 2nd floor, [employer] on main floor.  (+) Basement.    Bathroom setup:  Tub-Shower, [address] [employer]   Equipment owned:  W/C, [facility], [employer], [facility], Shower Chair, Reacher, [employer], [employer], Hospital bed, Home O2 for PRN Use and HS    Available Family Assist:  Wife works Full-time - unable to provide 24/7 assist    Prior Level of Function:  Pt reported being IND with all ADLs, Light IADLs, Transfers and Mobility using No AD for ambulation. Driving:  Yes - car, 3-wheeled motorcycle  Occupation:  Retired  - Works as a  during the school year    Pain Level:  Reported 4/10 Chronic LBP Mild Relief w/ Rest and Repositioning, Nsg Notified   Additional Complaints:  None    Cognition: A & O x 3 - cues for day/date   Able to Follow Multi-Step Commands w/ Min VCs   Memory:  good (-)   Sequencing:  good (-)   Problem solving:  good (-)   Judgement/safety:  good (-)  Additional Comments:  Pt was pleasant and cooperative.   Flat affect, fair eye contact    Vitals/Lab Values:  WFL room air        Functional Assessment:  AM-PAC Daily Activity Raw Score: 18/24     Initial Eval Status  Date: 7/11/22   Treatment Status  Date:  7-13-22 STGs = LTGs  Time frame: 10-14 days   Feeding IND after set up      NA   Grooming SUP/Set up    Standing at the sink   IND    Able to complete tasks standing at the sink w/o use of AD - no LOB/SOB   Mod I  Standing at the [employer] A/Set up    Seated EOB   IND    Able to don robe in sitting, able to retrieve item for task w/o use of AD for mobility   Mod I     LB Dressing Mod A/Set up    Mod A to don socks, thread LEs into pants d/t moderate LE swelling  Pt ed for safe/adaptive techs, use of adaptive equip   Min A/Set up    Min A to don socks seated EOB, Remote SUP/Set up to don pants seated/standing  Has and uses Adaptive equip at home   Mod I     Bathing NT    Pt ed re: Benefits of use of Shower chair/Tub Bench - has equip/does not use   NT     Mod I      Toileting NT     NT Mod I     Bed Mobility  Supine to sit: SUP   Sit to supine:  SUP     VCs for safety   Supine to sit: IND   Sit to supine:  NT Supine to sit: IND  Sit to supine: IND Functional Transfers Close SUP    EOB/Chair  Pt ed for safety/hand placement   IND    EOB/Chair Mod I     Functional Mobility Close SUP    Short distances in room/bathroom  Pt ed for safety/improved safety awareness   IND    Short distances in room/bathroom extended distances in hallway w/o use of AD - no LOB/SOB   Mod I     Balance Sitting:     Static:  Remote SUP EOB/Chair    Dynamic:  Close SUP w/ functional ax EOB     Standing:     Static:  SUP    Dynamic:  Close SUP w/ functional ax/mobility w/o AD   Sitting:     Static:  IND EOB/Chair    Dynamic:  IND w/ functional ax EOB     Standing:     Static:  IND w/o AD   Dynamic:  IND w/ functional ax/mobility w/o AD Sitting:     Static:  IND    Dynamic:  IND w/ functional ax    Standing:     Static:  Mod I w/ AD PRN    Dynamic:  Mod I w/ functional ax/mobility w/ AD PRN   Activity Tolerance Fair(+)     Good Good   Visual/  Perceptual    Hearing: WNL   Glasses: No    WFL   Hearing Aids:  No               Hand Dominance: Right   AROM Strength Additional Info:    RUE  WFL WNL Good ;   Good FMC/dexterity noted during ADL tasks     LUE WFL WNL Good ;    Good FMC/dexterity noted during ADL tasks       Sensation:  Denies numbness or tingling Eliu UEs   Tone: WFL Eliu UEs   Edema: None Noted Eliu UEs;  Moderate edema Distal Eliu LEs    Comments: Upon arrival, patient was found seated EOB. He was agreeable to participate in therapeutic ax. No Family present during session. Received permission from RN prior to engaging pt in OT services. Educated pt on role of OT services. At the end of the session, patient was properly positioned in b/s chair. Call light and phone within reach, all lines and tubes intact. Oriented pt to call bell. Made all appropriate Environmental Modifications to facilitate pt's level of IND and safety. All needs met.            Overall patient demonstrated decreased independence and safety during completion of ADL/functional transfer/mobility tasks. Pt would benefit from continued skilled OT to increase safety and independence with completion of ADL/IADL tasks for functional independence and quality of life. Treatment: OT treatment provided this date includes:    Instruction/training on safety and adapted techniques for completion of ADLs, use of DME/AD/Adaptive equip:     Instruction/training on safe functional mobility/transfer techniques, use of DME/AD:     Instruction/training on energy conservation techs (EC)/Pursed-Lip Breathing (PLB)/work simplification for completion of ADLs:      Neuromuscular Reeducation to facilitate balance/righting reactions for increased function with ADLs:     Skilled positioning/alignment for Edema Control, to maximize Pt's safety and ability to [facility] interact w/ his/her environment, maximize respiratory status   Activity tolerance - Sitting/Standing to improve endurance w/ functional ax    Cognitive retraining -  Oriented pt to current Date, Place and Situation; Cues for safety/safety awareness, sequencing, problem solving     Skilled monitoring of pt's response to tx ax       Consulted RN     Made all appropriate Environmental Modifications to facilitate pt's level of IND and safety. Pt and/or Family verbalized/demonstrated a Good(-) understanding of education provided. Will Review PRN.         Time In: 0908  Time Out: 0832  Total Treatment Time: 15 minutes    Min Units   OT Eval Low 68211       OT Eval Medium 22244      OT Eval High [id number]      OT Re-Eval [id number]       Therapeutic Ex [id number]       Therapeutic Activities 01202       ADL/Self Care 64152  15  1   Orthotic Management 70486       Manual 93725     Neuro Re-Ed 12509       Non-Billable Time            GERMÁN Lucas, OTR/L  # [id number]

## 2022-07-13 NOTE — DISCHARGE SUMMARY
[address]  Discharge Summary    PCP: Tracey Strange MD    Admit Date:7/7/2022  Discharge Date: 7/13/2022    Chief Complaint   Patient presents with    Shortness of Breath     hx CHF, sent from clinic because of fluid overload    Leg Swelling         Admission Diagnosis: 1. Acute exacerbation of HFpEF (EF 55-60% in 2021)  2. Prolonged QTc, QTc 515 on admission  3. Elevated troponin 2/2 CKD, chronically elevated, usually at the 40s  4. Hyperglycemia in the setting of uncontrolled IDDM,  on admission; better control of BG overnight  5. RADHA Stage I on CKD Stage, likely prerenal in the setting of acute exacerbation of HFpEF, cardiorenal syndrome  6. Hx of sinus node dysfunction s/p pacemaker placement   7. Hx of IDDM - on 60u basaglar and 10u premeals and SS at home  8. Rhenda Nel Hx of paroxysmal Afib, on metoprolol and eliquis  9 . Hx of HLD, on metoprolol  10. Hx of NIKO/OHS, on 4lpm at night; not using CPAP  11. Hx of hypothyroidism. On synthroid; MRD 07.44 this admission      Discharge Diagnosis: 1. Acute exacerbation of HFpEF (EF 55-60% in 2021) - improved  2. Prolonged QTc, QTc 515 on admission  3. Elevated troponin 2/2 CKD, chronically elevated, usually at the 40s  4. Hyperglycemia in the setting of uncontrolled IDDM -improved  5. RADHA Stage III on CKD Stage, likely prerenal in the setting of acute exacerbation of HFpEF, cardiorenal syndrome vs ischemic ATN in the setting of hypotension - improving  6. Hyponatremia 2/2 decreased GFR - resolved  7. Hx of sinus node dysfunction s/p pacemaker placement   8. Hx of IDDM - on 60u basaglar and 10u premeals and SS at home  9. Hx of paroxysmal Afib, on metoprolol and eliquis  10. Hx of HLD, on metoprolol  11. Hx of NIKO/OHS, on 4lpm at night; not using CPAP  12. Hx of hypothyroidism. On synthroid; YPT 14.65 this admission        Hospital Course:    The patient is a 69 y.o. male  who was sent from clinic to the emergency department due to concerns of shortness of breath and worsening leg edema. The SOB had been progressing for the past three weeks. It is exacerbated by walking and relieved with rest. The SOB is associated with an intermittent dry cough. His wife also noted lower limb swelling that had been worsening for the past three weeks. He sleeps in a recliner because of his lower back pain, but is unsure if he would feel SOB lying flat. He sleeps with a nasal canula on at 4L/minute. His wife notes that he sometimes does not adhere to his medications especially when she's not there, which the patient himself also has endorsed. He denies any chest pain, palpitations, PND, diaphoresis, abdominal pain, nausea, vomiting, constipation or diarrhea. On the day of admission, he was seen in the clinic for a high blood sugar follow up but there were concerns for SOB. He was noted to be hypoxic on ambulatory pulse ox (85% with ambulation) and he was sent to the ED for concerns of volume overload. In the ED, he arrived hemodynamically stable saturating well on room air. Labs were remarkable for sCr 1.5 (baseline 1.1-1.3), BUN 53, , proBNP 1,512, troponin 40 ->38. CXR showed pulmonary vascular congestion. He was given Bumex 1mg IV in the ED. Patient was admitted due to acute exacerbation of HFpEF. Patient was started on Bumex 1mg IV BID and was then switched to 2mg PO the next day. He had good response to diuresis. Nephrology was also consulted for RADHA, fluid and BP management. He was started on Entresto by nephrology and metolazone and aldactone were discontinued. However, patient then continued to have worsening creatinine values (peaking at 3.2) and developed relative hypotension. His SBP went down to 90s-100s but was asymptomatic. He was given gentle bolus to improve his BP. Acquanetta Natali was then discontinued. Leg swelling and breathing continued to improve throughout hospital course achieving euvolemia.  After some time, his creatinine, Skin: Negative for rash. Neurological: Negative for dizziness and headaches. Psychiatric/Behavioral: The patient is not nervous/anxious. Follow up in Clinic:    Follow up with his Blood sugar. His medicine has adjusted to Glargine 55 units nightly; Lispro 20 units with meals.  Recommend referral with an endocrinologist   Follow up his visit with nephrologist and CHF clinic      Significant findings (history and exam, laboratory, radiological, pathology, other tests):   · General Appearance: alert, appears stated age and cooperative  · Neck: no adenopathy, no carotid bruit, no JVD, supple, symmetrical, trachea midline and thyroid not enlarged, symmetric, no tenderness/mass/nodules  · Lung: clear to auscultation bilaterally  · Heart: regular rate and rhythm, S1, S2 normal, no murmur, click, rub or gallop  · Abdomen: soft, non-tender; bowel sounds normal; no masses,  no organomegaly  · Extremities:  Edema has improved  · Musculokeletal: No joint swelling, no muscle tenderness. ROM normal in all joints of extremities. · Neurologic: Mental status: Alert, oriented, thought content appropriate      Consults:  1. Pulmonology  2. Nephrology    Condition at discharge: Stable    Disposition: home    Time taken for discharge : < 30 mins [] >30 mins [x]    Discharge Medications:  Current Discharge Medication List      START taking these medications    Details   bumetanide (BUMEX) 2 MG tablet Take 1 tablet by mouth daily  Qty: 30 tablet, Refills: 3      insulin glargine (LANTUS SOLOSTAR) 100 UNIT/ML injection pen Inject 55 Units into the skin nightly  Qty: 15 pen, Refills: 3      !! Insulin Pen Needle (PEN NEEDLES) 31G X 6 MM MISC Once daily. May substitute brand for insurance coverage. Qty: 100 each, Refills: 3       !! - Potential duplicate medications found. Please discuss with provider.       CONTINUE these medications which have CHANGED    Details   insulin lispro, 1 Unit Dial, (HUMALOG KWIKPEN) 100 (ONETOUCH VERIO) strip TEST IN THE MORNING AND IN THE EVENING  Qty: 200 each, Refills: 1    Associated Diagnoses: Type 2 diabetes mellitus with diabetic autonomic neuropathy, with long-term current use of insulin (HCC)      ELIQUIS 5 MG TABS tablet Take 1 tablet by mouth 2 times daily  Qty: 180 tablet, Refills: 1    Associated Diagnoses: Persistent atrial fibrillation (HCC)      atorvastatin (LIPITOR) 40 MG tablet TAKE ONE TABLET BY MOUTH DAILY  Qty: 90 tablet, Refills: 1    Associated Diagnoses: Type 2 diabetes mellitus with diabetic autonomic neuropathy, with long-term current use of insulin (HCC)      metoprolol succinate (TOPROL XL) 25 MG extended release tablet Take 1 tablet by mouth daily  Qty: 90 tablet, Refills: 1    Associated Diagnoses: Chronic heart failure with preserved ejection fraction ([address] Essential hypertension      Misc. Devices MISC Please add portability to current O2 order. Resp to evaluate patient for OCD device. Qty: 1 each, Refills: 0    Associated Diagnoses: Chronic obstructive pulmonary disease, unspecified COPD type ([address]      Misc. Devices KIT Dispense 1 blood pressure cuff. Qty: 1 kit, Refills: 0    Associated Diagnoses: Essential hypertension      OXYGEN Inhale 2.5 L into the lungs nightly  Qty: 1 Device, Refills: 99      Cholecalciferol (VITAMIN D3) 1000 units TABS TAKE TWO TABLETS BY MOUTH EVERY DAY  Qty: 30 tablet, Refills: 0      zoster recombinant adjuvanted vaccine (SHINGRIX) 50 MCG/0.5ML SUSR injection 1 dose now and repeat in 2-6 months  Qty: 0.5 mL, Refills: 0      acetaminophen (TYLENOL) 500 MG tablet Take 1,000 mg by mouth daily as needed for Pain. !! - Potential duplicate medications found. Please discuss with provider. Activity: activity as tolerated  Diet: diabetic diet    Follow-up appointments:   1. IM clinic at [facility] on July 18, 2022 at 9 AM.  2. Follow-up with nephrology after 2 weeks  3.  Follow-up with CHF clinic    Patient Instructions:    Please take all medications as indicated   Diet: diabetic diet    Activity: activity as tolerated   New Medications started during this hospital stay  o None   Changes to your medications  o Bumex 2mg daily  o Glargine 55 units nightly  o Lispro 20 units with meals   Medications you should stop taking   o None   Additional labs, testing or imaging needed after discharge   o None   Please contact us if you have any concerns, wish to change or make an appointment:  [facility]    Phone: [phone]   Fax: [phone]   [facility] [address]   Or please call the nurse line at [phone].  Should you have further questions in regards to this visit, you can review your clinical note and after visit summary document on your [facility] account. Other questions can be directed to our nurse line at [phone].  Other than any new prescriptions given to you today, the list of home medications on this After Visit Summary are based on information provided to us from you and your healthcare providers. This information, including the list, dose, and frequency of medications is only as accurate as the information you provided. If you have any questions or concerns about your home medications, please contact your Primary Care Physician for further clarification.       Khushbu Ge MD  PGY 1   10:06 PM 7/13/2022

## 2022-07-13 NOTE — PROGRESS NOTES
Senior Resident Addendum:  I have seen and examined the patient with the intern. I have discussed the case, including pertinent history and exam findings with the intern. I agree with the assessment, plan and orders as documented above with the following additions:    Patient seen and examined at bedside this morning in no acute distress. Patient has improved overall. No active complaints at this time. He has been up and walking around the hallways with no problems. Sodium levels and creatinine continues to improve. Blood sugars were better controlled overnight. Edema has significantly improved compared to admission, with minimal edema noted today. No abdominal distention noted. 1. Acute exacerbation of HFpEF (EF 55-60% in 2021) - improved  2. Prolonged QTc, QTc 515 on admission  3. Elevated troponin 2/2 CKD, chronically elevated, usually at the 40s  4. Hyperglycemia in the setting of uncontrolled IDDM,  on admission; better control of BG overnight  5. RADHA Stage III on CKD Stage, likely prerenal in the setting of acute exacerbation of HFpEF, cardiorenal syndrome vs ischemic ATN in the setting of hypotension (baseline 1.1-1.3) - sCr 1.5 on admission -> peaked at 3.2, at 1.8 today  6. Hypovolemic isotonic hyponatremia likely 2/2 decreased GFR - resolved  7. Hypokalemia, likely 2/2 diuresis -resolved  8. Hx of sinus node dysfunction s/p pacemaker placement   9. Hx of IDDM - on 60u basaglar and 10u premeals and SS at home  10. Hx of paroxysmal Afib, on metoprolol and eliquis  11. Hx of HLD, on metoprolol  12. Hx of NIKO/OHS, on 4lpm at night; not using CPAP  13.  Hx of hypothyroidism. On synthroid; CGY 85.83 this admission    Plan:  · For discharge today  · Continue bumex 2mg PO daily  · Continue metoprolol 25mg daily  · Continue eliquis, statin and aspirin  · Continue synthroid 88mcg daily, will need repeat TSH levels in 3 months  · Continue 55u glargine nightly at discharge  · Start 20u premeals at discharge  · To resume home dose of metformin and Jardiance  · To resume dulera at discharge  · To follow-up with IM clinic on July 18, 2022 at 9am  · To follow-up with nephrology in 2 weeks  · To follow-up with CHF clinic  · Will need Endo referral as outpatient    Diamond Acharya MD  7/13/2022  3:26 PM

## 2022-07-13 NOTE — PROGRESS NOTES
CLINICAL PHARMACY NOTE: MEDS TO BEDS    Total # of Prescriptions Filled: 2   The following medications were delivered to the patient:  · Bumetanide 2  · Insulin lispro 100    Additional Documentation:    Pt aware 2 other scripts were sent to [facility]

## 2022-07-13 NOTE — PROGRESS NOTES
[facility]  Internal Medicine Residency / [address]    Attending Physician Statement  I have discussed the case, including pertinent history and exam findings with the resident and the team.  I have seen and examined the patient and the key elements of the encounter have been performed by me. I agree with the assessment, plan and orders as documented by the resident.       Case Discussed During AM Rounds    Pro-BNP has improved this am    Cr continues to improve- currently Cr 1.8 this am (close to baseline)    Sodium has improved to normal levels   Significant improvement in the last 24 hours    Good urine output documented    BP remains low normal- avoid hypotension- as well as intensification of medications that could lead to further hypotension    Ambulating hallway without difficulty    Feeling well today- anxious for DC to home      Acute on Chronic Diastolic Heart Failure- currently volume status is stable    Nephrology following   Renal function has improved   U/O remains stable   ProBNP has decreased significantly since admission    Reduced diuresis likely on DC   Repeat labs planned for this afternoon    Hyponatremia-resolved      RADHA on CKD Stage III- Cr improving to baseline currently    During admission- diuresis adjusted during admission    Urine output- non-oliguric    Nephrology following   Improved I/O's   Cr is trending down at this time    Repeat labs this afternoon   Avoid Nephrotoxic agents; avoid hypotension     Type II DM- insulin requiring and complicated by Hyperglycemia    Glucose is also improving with adjustment in insulin regimen   Outpatient ENDO encouraged    Insulin to be adjusted at DC- prandial insulin will be increased to match the increase needs inpatient needs and hyperglycemia noted prior to admission     Normocytic Anemia    H/H Stable    Disposition- Consider DC today- coordinate with Nephrology- repeat labs this afternoon- if stable- likely DC to home     Remainder of medical problems as per resident note.       Jazlyn Pinto MD, Madeline Harvey   Internal Medicine Residency Faculty

## 2022-07-18 ENCOUNTER — OFFICE VISIT (OUTPATIENT)
Dept: INTERNAL MEDICINE | Age: 67
End: 2022-07-18
Payer: MEDICARE

## 2022-07-18 VITALS
HEIGHT: 67 IN | SYSTOLIC BLOOD PRESSURE: 127 MMHG | HEART RATE: 72 BPM | TEMPERATURE: 97.9 F | RESPIRATION RATE: 20 BRPM | WEIGHT: 225.9 LBS | DIASTOLIC BLOOD PRESSURE: 62 MMHG | OXYGEN SATURATION: 92 % | BODY MASS INDEX: 35.46 KG/M2

## 2022-07-18 DIAGNOSIS — J98.4 RESTRICTIVE LUNG DISEASE SECONDARY TO OBESITY: ICD-10-CM

## 2022-07-18 DIAGNOSIS — E03.9 ACQUIRED HYPOTHYROIDISM: ICD-10-CM

## 2022-07-18 DIAGNOSIS — I48.19 PERSISTENT ATRIAL FIBRILLATION (HCC): ICD-10-CM

## 2022-07-18 DIAGNOSIS — E66.9 RESTRICTIVE LUNG DISEASE SECONDARY TO OBESITY: ICD-10-CM

## 2022-07-18 DIAGNOSIS — I10 ESSENTIAL HYPERTENSION: ICD-10-CM

## 2022-07-18 DIAGNOSIS — I50.32 CHRONIC HEART FAILURE WITH PRESERVED EJECTION FRACTION (HCC): ICD-10-CM

## 2022-07-18 DIAGNOSIS — Z79.4 TYPE 2 DIABETES MELLITUS WITH DIABETIC AUTONOMIC NEUROPATHY, WITH LONG-TERM CURRENT USE OF INSULIN (HCC): ICD-10-CM

## 2022-07-18 DIAGNOSIS — E11.65 UNCONTROLLED TYPE 2 DIABETES MELLITUS WITH HYPERGLYCEMIA (HCC): ICD-10-CM

## 2022-07-18 DIAGNOSIS — Z09 HOSPITAL DISCHARGE FOLLOW-UP: Primary | ICD-10-CM

## 2022-07-18 DIAGNOSIS — E11.43 TYPE 2 DIABETES MELLITUS WITH DIABETIC AUTONOMIC NEUROPATHY, WITH LONG-TERM CURRENT USE OF INSULIN (HCC): ICD-10-CM

## 2022-07-18 PROCEDURE — 1111F DSCHRG MED/CURRENT MED MERGE: CPT | Performed by: STUDENT IN AN ORGANIZED HEALTH CARE EDUCATION/TRAINING PROGRAM

## 2022-07-18 PROCEDURE — 99495 TRANSJ CARE MGMT MOD F2F 14D: CPT | Performed by: STUDENT IN AN ORGANIZED HEALTH CARE EDUCATION/TRAINING PROGRAM

## 2022-07-18 PROCEDURE — 99212 OFFICE O/P EST SF 10 MIN: CPT | Performed by: STUDENT IN AN ORGANIZED HEALTH CARE EDUCATION/TRAINING PROGRAM

## 2022-07-18 RX ORDER — APIXABAN 5 MG/1
5 TABLET, FILM COATED ORAL 2 TIMES DAILY
Qty: 180 TABLET | Refills: 1 | Status: SHIPPED | OUTPATIENT
Start: 2022-07-18

## 2022-07-18 RX ORDER — METOPROLOL SUCCINATE 25 MG/1
25 TABLET, EXTENDED RELEASE ORAL DAILY
Qty: 90 TABLET | Refills: 1 | Status: SHIPPED | OUTPATIENT
Start: 2022-07-18

## 2022-07-18 RX ORDER — LEVOTHYROXINE SODIUM 88 UG/1
88 TABLET ORAL DAILY
Qty: 90 TABLET | Refills: 1 | Status: SHIPPED | OUTPATIENT
Start: 2022-07-18 | End: 2023-01-14

## 2022-07-18 RX ORDER — INSULIN LISPRO 100 [IU]/ML
20 INJECTION, SOLUTION INTRAVENOUS; SUBCUTANEOUS
Qty: 27 ML | Refills: 1 | COMMUNITY
Start: 2022-07-18 | End: 2022-10-19

## 2022-07-18 RX ORDER — ATORVASTATIN CALCIUM 40 MG/1
TABLET, FILM COATED ORAL
Qty: 90 TABLET | Refills: 1 | Status: SHIPPED | OUTPATIENT
Start: 2022-07-18

## 2022-07-18 ASSESSMENT — ENCOUNTER SYMPTOMS
SHORTNESS OF BREATH: 1
BACK PAIN: 0
NAUSEA: 0
WHEEZING: 0
CONSTIPATION: 0
ABDOMINAL PAIN: 0
DIARRHEA: 0
VOMITING: 0
RHINORRHEA: 0
COUGH: 0
BLOOD IN STOOL: 0

## 2022-07-18 NOTE — PROGRESS NOTES
[facility]  [facility]    Attending Physician Statement  I have discussed the case, including pertinent history and exam findings with the resident physician. I have seen and examined the patient and the key elements of the encounter have been performed by me. I agree with the assessment, plan and orders as documented by the resident. I have reviewed all pertinent PMHx, PSHx, FamHx, SocialHx, medications, and allergies and updated history as appropriate. Patient presents for hospital follow up appointment. Admitted for CHF exacerbation complicated by RADHA. He remains at stable weight of 225 pounds, reports improvement in LE swelling post-discharge. HFpEF -- referral to CHF clinic, continue Bumex and monitor renal function at CHF clinic     RADHA with CKD 3 -- continue bumex, nephrology appt scheduled this week. DM 2 -- now uncontrolled A1c 11% -- insulin up-titrated to Lantus 55 units, Lispro 20 units TID at hospital discharge; glucose reading this ; check US abdomen to evaluate pancreas in setting of CKD (Cr 1.8) with patient adherent to insulin regimen to evaluate for possible pancreatic lesion     Remainder of medical problems as per resident note.     Jarrod Howard DO  7/18/2022 10:07 AM

## 2022-07-18 NOTE — PROGRESS NOTES
Post-Discharge Transitional Care Follow Up      Kevan Rocha   YOB: 1955    Date of Office Visit:  7/18/2022  Date of Hospital Admission: 7/7/22  Date of Hospital Discharge: 7/13/22  Readmission Risk Score (high >=14%. Medium >=10%):Readmission Risk Score: 18.5 ( )      Care management risk score Rising risk (score 2-5) and Complex Care (Scores >=6): 7     Non face to face  following discharge, date last encounter closed (first attempt may have been earlier): *No documented post hospital discharge outreach found in the last 14 days     Call initiated 2 business days of discharge: *No response recorded in the last 14 days     Persistent atrial fibrillation ([facility] [address]  The following orders have not been finalized:  -     ELIQUIS 5 MG TABS tablet  Type 2 diabetes mellitus with diabetic autonomic neuropathy, with long-term current use of insulin ([facility] [address]  The following orders have not been finalized:  -     atorvastatin (LIPITOR) 40 MG tablet  Chronic heart failure with preserved ejection fraction ([facility] [address]  The following orders have not been finalized:  -     metoprolol succinate (TOPROL XL) 25 MG extended release tablet  Essential hypertension  The following orders have not been finalized:  -     metoprolol succinate (TOPROL XL) 25 MG extended release tablet  Acquired hypothyroidism  The following orders have not been finalized:  -     levothyroxine (SYNTHROID) 88 MCG tablet  Restrictive lung disease secondary to obesity  The following orders have not been finalized:  -     mometasone-formoterol (DULERA) 200-5 MCG/ACT inhaler      Medical Decision Making: moderate complexity  No follow-ups on file. Subjective:       Inpatient course: Discharge summary reviewed- see chart. Interval history/Current status: Patient for hosptial follow up for exacerbation of heart failure. Claims sob is better. B/l leg swelling better. Weighs himselft at home.  Weight at target, notes he has been at same weight or less diastolic heart failure (HCC)    Hyponatremia    Normocytic anemia    Hypotension       Medications listed as ordered at the time of discharge from hospital     Medication List            Accurate as of July 18, 2022  9:46 AM. If you have any questions, ask your nurse or doctor. CHANGE how you take these medications      OXYGEN  Inhale 2.5 L into the lungs nightly  What changed: additional instructions            CONTINUE taking these medications      acetaminophen 500 MG tablet  Commonly known as: TYLENOL     aspirin 81 MG EC tablet  Commonly known as: Aspir-Low  TAKE ONE TABLET BY MOUTH EVERY DAY     atorvastatin 40 MG tablet  Commonly known as: LIPITOR  TAKE ONE TABLET BY MOUTH DAILY     bumetanide 2 MG tablet  Commonly known as: BUMEX  Take 1 tablet by mouth daily     Eliquis 5 MG Tabs tablet  Generic drug: apixaban  Take 1 tablet by mouth 2 times daily     empagliflozin 25 MG tablet  Commonly known as: Jardiance  Take 1 tablet by mouth daily     insulin lispro (1 Unit Dial) 100 UNIT/ML Sopn  Commonly known as: HumaLOG KwikPen  Inject 20 Units into the skin 3 times daily (before meals)     IRON-C PO     Lancets Misc  Give Delica lancets. Tests twice a day A.M. And P.M     Lantus SoloStar 100 UNIT/ML injection pen  Generic drug: insulin glargine  Inject 55 Units into the skin nightly     levothyroxine 88 MCG tablet  Commonly known as: SYNTHROID  Take 1 tablet by mouth Daily     metFORMIN 500 MG tablet  Commonly known as: GLUCOPHAGE  Take 1 tablet by mouth 2 times daily (with meals)     metoprolol succinate 25 MG extended release tablet  Commonly known as: TOPROL XL  Take 1 tablet by mouth daily     * Misc. Devices Kit  Dispense 1 blood pressure cuff. * Misc. Devices Misc  Please add portability to current O2 order. Resp to evaluate patient for OCD device.      mometasone-formoterol 200-5 MCG/ACT inhaler  Commonly known as: Dulera  Inhale 2 puffs into the lungs 2 times daily Rinse mouth after using. PAP medication. OneTouch Verio strip  Generic drug: blood glucose test strips  TEST IN THE MORNING AND IN THE EVENING     * Pen Needles 32G X 6 MM Misc  Take insulin daily     * Pen Needles 31G X 6 MM Misc  Once daily. May substitute brand for insurance coverage. Potassium 99 MG Tabs     vitamin D3 25 MCG (1000 UT) Tabs tablet  Commonly known as: CHOLECALCIFEROL  TAKE TWO TABLETS BY MOUTH EVERY DAY     zoster recombinant adjuvanted vaccine 50 MCG/0.5ML Susr injection  Commonly known as: SHINGRIX  1 dose now and repeat in 2-6 months           * This list has 4 medication(s) that are the same as other medications prescribed for you. Read the directions carefully, and ask your doctor or other care provider to review them with you. Medications marked \"taking\" at this time  Outpatient Medications Marked as Taking for the 7/18/22 encounter (Office Visit) with Horton Cheadle, MD   Medication Sig Dispense Refill    insulin lispro, 1 Unit Dial, (HUMALOG KWIKPEN) 100 UNIT/ML SOPN Inject 20 Units into the skin 3 times daily (before meals) 27 mL 1    bumetanide (BUMEX) 2 MG tablet Take 1 tablet by mouth daily 30 tablet 3    insulin glargine (LANTUS SOLOSTAR) 100 UNIT/ML injection pen Inject 55 Units into the skin nightly 15 pen 3    Insulin Pen Needle (PEN NEEDLES) 31G X 6 MM MISC Once daily. May substitute brand for insurance coverage.  100 each 3    empagliflozin (JARDIANCE) 25 MG tablet Take 1 tablet by mouth daily 90 tablet 3    metFORMIN (GLUCOPHAGE) 500 MG tablet Take 1 tablet by mouth 2 times daily (with meals) 60 tablet 5    Potassium 99 MG TABS Take 1 tablet by mouth daily      Ferrous Gluconate-C-Folic Acid (IRON-C PO) Take by mouth      levothyroxine (SYNTHROID) 88 MCG tablet Take 1 tablet by mouth Daily 90 tablet 1    aspirin (ASPIR-LOW) 81 MG EC tablet TAKE ONE TABLET BY MOUTH EVERY DAY 90 tablet 1    Insulin Pen Needle (PEN NEEDLES) 32G X 6 MM MISC Take insulin daily 100 each 1 Lancets MISC Give Delica lancets. Tests twice a day A.M. And P.M 200 each 1    blood glucose test strips (ONETOUCH VERIO) strip TEST IN THE MORNING AND IN THE EVENING 200 each 1    ELIQUIS 5 MG TABS tablet Take 1 tablet by mouth 2 times daily 180 tablet 1    atorvastatin (LIPITOR) 40 MG tablet TAKE ONE TABLET BY MOUTH DAILY 90 tablet 1    metoprolol succinate (TOPROL XL) 25 MG extended release tablet Take 1 tablet by mouth daily 90 tablet 1    OXYGEN Inhale 2.5 L into the lungs nightly (Patient taking differently: Inhale 2.5 L into the lungs nightly Spouse states uses  5 L/min via nc bedtime) 1 Device 99    Cholecalciferol (VITAMIN D3) 1000 units TABS TAKE TWO TABLETS BY MOUTH EVERY DAY 30 tablet 0    acetaminophen (TYLENOL) 500 MG tablet Take 1,000 mg by mouth daily as needed for Pain. Medications patient taking as of now reconciled against medications ordered at time of hospital discharge: Yes    Review of Systems   Constitutional:  Negative for chills and fatigue. HENT:  Negative for rhinorrhea. Respiratory:  Positive for shortness of breath. Negative for cough and wheezing. Cardiovascular:  Positive for leg swelling. Negative for chest pain and palpitations. Gastrointestinal:  Negative for abdominal pain, blood in stool, constipation, diarrhea, nausea and vomiting. Genitourinary:  Negative for dysuria, hematuria and urgency. Musculoskeletal:  Negative for arthralgias and back pain. Neurological:  Negative for dizziness, syncope, light-headedness and headaches. Psychiatric/Behavioral:  Negative for sleep disturbance. Objective:    /60 (Site: Left Upper Arm, Position: Sitting, Cuff Size: Large Adult)   Pulse 68   Temp 97.9 °F (36.6 °C) (Temporal)   Resp 20   Ht 5' 7\" (1.702 m)   Wt 225 lb 14.4 oz (102.5 kg)   SpO2 92%   BMI 35.38 kg/m²   Physical Exam  Vitals reviewed. Exam conducted with a chaperone present (patient's wife).    Constitutional:       General: He is not

## 2022-07-18 NOTE — PATIENT INSTRUCTIONS
[place] Internal Medicine Resident Service    Activity as tolerated  Diet: low sodium and diabetic    Be compliant with your medications and take them as prescribed. Special Instructions:   Please continue taking bumex and insulin as prescribed. 2. Please use a cane or walker to prevent falls. 3. We have referred you to the Heart failure clinic. They will call you to inform you of your appointment date and time. 4. We have ordered an ultrasound study of your pancreas. The lab will call you to inform you of the date and time of your test.     5. Please return in 1 month for a follow up appointment. Hospital Follow up: [id number] [address] with House Team   Call to confirm appointment Tel: [phone] ([address]  Internal Medicine Clinic)     Other Follow-Ups:    Future Appointments   Date Time Provider Stacie Mtz   8/8/2022  2:00 PM Jennifer Barriga MD [facility]   8/22/2022  9:00 AM Attila Zaldivar MD [facility]       Other than any new prescriptions given to you today, the list of home going meds on this After Visit Summary are based on information provided to us from you. This information, including the list, dose, and frequency of medications is only as accurate as the information you provided. If you have any questions or concerns about your home medications, please contact your Primary Care Physician for further clarification.       Attila Zaldivar MD PGY-2  7/18/2022  10:38 AM

## 2022-07-19 ENCOUNTER — TELEPHONE (OUTPATIENT)
Dept: OTHER | Age: 67
End: 2022-07-19

## 2022-07-19 NOTE — ED NOTES
Department of Emergency Medicine  FIRST PROVIDER TRIAGE NOTE             Independent MLP           3/10/22  4:24 PM EST    Date of Encounter: 3/10/22   MRN: [id number]      HPI: Merlin Peraza is a 74 y.o. male who presents to the ED for Urinary Retention (ha not urinated since lastnight)     Urine retention. He states he is not able to urinate since 10 PM last night. He denies any abdominal pain, fevers, chills, back pain. He denies any history of any prostate issues. He denies any new medications. Denies any trauma. Denies any hematuria. ROS: Negative for abd pain, back pain or fever. PE: Gen Appearance/Constitutional: alert  GI: soft and NT     Initial Plan of Care: All treatment areas with department are currently occupied. Plan to order/Initiate the following while awaiting opening in ED: labs.   Initiate Treatment-Testing, Proceed toTreatment Area When Bed Available for ED Attending/MLP to Continue Care    Electronically signed by JACQUELINE Watts CNP   DD: 3/10/22         JACQUELINE Watts CNP  03/10/22 [id number] minimum assist (75% patients effort)

## 2022-07-22 ENCOUNTER — HOSPITAL ENCOUNTER (OUTPATIENT)
Age: 67
Discharge: HOME | End: 2022-07-22
Payer: MEDICARE

## 2022-07-22 LAB
ANION GAP SERPL CALCULATED.3IONS-SCNC: 15 MMOL/L (ref 7–16)
BUN BLDV-MCNC: 49 MG/DL (ref 6–23)
CALCIUM SERPL-MCNC: 11.9 MG/DL (ref 8.6–10.2)
CHLORIDE BLD-SCNC: 92 MMOL/L (ref 98–107)
CO2: 29 MMOL/L (ref 22–29)
CREAT SERPL-MCNC: 2.2 MG/DL (ref 0.7–1.2)
GFR AFRICAN AMERICAN: 36
GFR NON-AFRICAN AMERICAN: 30 ML/MIN/1.73
GLUCOSE BLD-MCNC: 159 MG/DL (ref 74–99)
POTASSIUM SERPL-SCNC: 3.6 MMOL/L (ref 3.5–5)
PRO-BNP: 1464 PG/ML (ref 0–125)
SODIUM BLD-SCNC: 136 MMOL/L (ref 132–146)

## 2022-07-22 PROCEDURE — 36415 COLL VENOUS BLD VENIPUNCTURE: CPT

## 2022-07-22 PROCEDURE — 80048 BASIC METABOLIC PNL TOTAL CA: CPT

## 2022-07-22 PROCEDURE — 83880 ASSAY OF NATRIURETIC PEPTIDE: CPT

## 2022-08-09 ENCOUNTER — HOSPITAL ENCOUNTER (OUTPATIENT)
Age: 67
Discharge: HOME | End: 2022-08-09
Payer: MEDICARE

## 2022-08-09 ENCOUNTER — HOSPITAL ENCOUNTER (OUTPATIENT)
Dept: OTHER | Age: 67
Setting detail: THERAPIES SERIES
Discharge: HOME | End: 2022-08-09
Payer: MEDICARE

## 2022-08-09 VITALS
BODY MASS INDEX: 37.43 KG/M2 | OXYGEN SATURATION: 94 % | SYSTOLIC BLOOD PRESSURE: 149 MMHG | RESPIRATION RATE: 18 BRPM | DIASTOLIC BLOOD PRESSURE: 65 MMHG | WEIGHT: 239 LBS | HEART RATE: 67 BPM

## 2022-08-09 LAB
ANION GAP SERPL CALCULATED.3IONS-SCNC: 14 MMOL/L (ref 7–16)
BUN BLDV-MCNC: 33 MG/DL (ref 6–23)
CALCIUM SERPL-MCNC: 8.7 MG/DL (ref 8.6–10.2)
CHLORIDE BLD-SCNC: 98 MMOL/L (ref 98–107)
CO2: 26 MMOL/L (ref 22–29)
CREAT SERPL-MCNC: 1.6 MG/DL (ref 0.7–1.2)
GFR AFRICAN AMERICAN: 52
GFR NON-AFRICAN AMERICAN: 43 ML/MIN/1.73
GLUCOSE BLD-MCNC: 234 MG/DL (ref 74–99)
POTASSIUM SERPL-SCNC: 4 MMOL/L (ref 3.5–5)
PRO-BNP: 1936 PG/ML (ref 0–125)
SODIUM BLD-SCNC: 138 MMOL/L (ref 132–146)

## 2022-08-09 PROCEDURE — 80048 BASIC METABOLIC PNL TOTAL CA: CPT

## 2022-08-09 PROCEDURE — 36415 COLL VENOUS BLD VENIPUNCTURE: CPT

## 2022-08-09 PROCEDURE — 99204 OFFICE O/P NEW MOD 45 MIN: CPT

## 2022-08-09 PROCEDURE — 83880 ASSAY OF NATRIURETIC PEPTIDE: CPT

## 2022-08-09 NOTE — PROGRESS NOTES
Congestive Heart Failure [address]         Estrellita Teran   1955          Referring Provider: Edgardo Alexis  Primary Care Physician: [address]  Cardiologist: NOT ESTABLISHED YET/ EP DR. Dinah Guerra  Nephrologist:  [address]        History of Present Illness:     Estrellita Teran is a 69 y.o. male with a history of HFpEF, most recent EF 55-60%. Patient Story:    He does  have dyspnea with exertion, shortness of breath, or decline in overall functional capacity. He does have orthopnea, PND, nocturnal cough or hemoptysis. He does not have abdominal distention or bloating, early satiety, anorexia/change in appetite. He does has a good urinary response to  oral diuretic. He does have  lower extremity edema. He denies lightheadedness, dizziness. He denies palpitations, syncope or near syncope. He does not complain of chest pain, pressure, discomfort. No Known Allergies        Prior to Visit Medications    Medication Sig Taking? Authorizing Provider   ELIQUIS 5 MG TABS tablet Take 1 tablet by mouth in the morning and 1 tablet before bedtime. Patient not taking: Reported on 8/9/2022  Maximilian Mancilla MD   atorvastatin (LIPITOR) 40 MG tablet TAKE ONE TABLET BY MOUTH DAILY  Maximilian Mancilla MD   metoprolol succinate (TOPROL XL) 25 MG extended release tablet Take 1 tablet by mouth in the morning. Maximilian Mancilla MD   levothyroxine (SYNTHROID) 88 MCG tablet Take 1 tablet by mouth in the morning. Maximilian Mancilla MD   mometasone-formoterol [facility]) 200-5 MCG/ACT inhaler Inhale 2 puffs into the lungs in the morning and 2 puffs before bedtime. Rinse mouth after using. PAP medication. Sharon Pulido MD   insulin lispro, 1 Unit Dial, (HUMALOG KWIKPEN) 100 UNIT/ML SOPN Inject 20 Units into the skin in the morning and 20 Units at noon and 20 Units in the evening. Inject before meals. Do all this for 225 doses.   Maximilian Mancilla MD   bumetanide (BUMEX) 2 MG tablet Take 1 tablet by mouth daily  Addison Farfan MD   insulin glargine (LANTUS SOLOSTAR) 100 UNIT/ML injection pen Inject 55 Units into the skin nightly  Reyes Carter MD   Insulin Pen Needle (PEN NEEDLES) 31G X 6 MM MISC Once daily. May substitute brand for insurance coverage. Reyes Carter MD   empagliflozin (JARDIANCE) 25 MG tablet Take 1 tablet by mouth daily  Shasta Jones DO   metFORMIN (GLUCOPHAGE) 500 MG tablet Take 1 tablet by mouth 2 times daily (with meals)  Gerry Garcia MD   Potassium 99 MG TABS Take 1 tablet by mouth daily  Historical Provider, MD   Ferrous Gluconate-C-Folic Acid (IRON-C PO) Take by mouth  Historical Provider, MD   aspirin (ASPIR-LOW) 81 MG EC tablet TAKE ONE TABLET BY MOUTH EVERY DAY  Yolande Rangel MD   Insulin Pen Needle (PEN NEEDLES) 32G X 6 MM MISC Take insulin daily  Yolande Rangel MD   Lancets MISC Give Delica lancets. Tests twice a day A.M. And P.M  Yolande Rangel MD   blood glucose test strips (ONETOUCH VERIO) strip TEST IN THE MORNING AND IN THE EVENING  Yolande Rangel MD   Misc. Devices MISC Please add portability to current O2 order. Resp to evaluate patient for OCD device. Ora Torres MD   Misc. Devices KIT Dispense 1 blood pressure cuff. Patient not taking: Reported on 7/18/2022  Mahendra Pace MD   OXYGEN Inhale 2.5 L into the lungs nightly  Patient taking differently: Inhale 2.5 L into the lungs nightly Spouse states uses  5 L/min via nc bedtime  Romana Deshpande MD   Cholecalciferol (VITAMIN D3) 1000 units TABS TAKE TWO TABLETS BY MOUTH EVERY DAY  Romana Deshpande MD   zoster recombinant adjuvanted vaccine [facility]) 50 MCG/0.5ML SUSR injection 1 dose now and repeat in 2-6 months  Romana Deshpande MD   acetaminophen (TYLENOL) 500 MG tablet Take 1,000 mg by mouth daily as needed for Pain. Patient not taking: Reported on 8/9/2022  Historical Provider, MD           Guideline directed medical:  ARNI/ACE I/ARB: No  Beta blocker:   Yes  Aldosterone antagonist: No        Physical Examination:     BP (!) 149/65   Pulse 67   Resp 18   Wt 239 lb (108.4 kg)   SpO2 94%   BMI 37.43 kg/m²                    HEENT (Head, Ears, Eyes, Nose, & Throat)  HEENT (WDL): Within Defined Limits    Respiratory  Respiratory Pattern: Regular  Chest Assessment: Chest expansion symmetrical  L Breath Sounds: Clear, Diminished  R Breath Sounds: Fine Crackles, Diminished              Cardiac  Cardiac Rhythm: Ventricular paced    Rhythm Interpretation  Heart Rate: 67         Gastrointestinal  Abdominal (WDL): Exceptions to WDL  Abdomen Inspection: Soft, Rotund, Distended  RUQ Bowel Sounds: Active  LUQ Bowel Sounds: Active  RLQ Bowel Sounds: Active  LLQ Bowel Sounds: Active          Bowel Sounds  RUQ Bowel Sounds: Active  LUQ Bowel Sounds: Active  RLQ Bowel Sounds: Active  LLQ Bowel Sounds:  Active    Peripheral Vascular  Peripheral Vascular (WDL): Exceptions to WDL  Edema: Right lower extremity, Left lower extremity  RLE Edema: Trace, Non-pitting  LLE Edema: Trace, Non-pitting                                                 Heart Rate: 67                     LAB DATA:    Last 3 BMP      Sodium (mmol/L)   Date Value   08/09/2022 138   07/22/2022 136   07/13/2022 133     Potassium (mmol/L)   Date Value   08/09/2022 4.0   07/22/2022 3.6   07/13/2022 4.2     Potassium reflex Magnesium (mmol/L)   Date Value   03/10/2022 5.0   09/18/2021 5.5 (H)   09/18/2021 5.3 (H)   09/18/2021 6.1 (H)     Chloride (mmol/L)   Date Value   08/09/2022 98   07/22/2022 92 (L)   07/13/2022 89 (L)     CO2 (mmol/L)   Date Value   08/09/2022 26   07/22/2022 29   07/13/2022 30 (H)     BUN (mg/dL)   Date Value   08/09/2022 33 (H)   07/22/2022 49 (H)   07/13/2022 83 (H)     Glucose (mg/dL)   Date Value   08/09/2022 234 (H)   07/22/2022 159 (H)   07/13/2022 285 (H)   04/18/2012 133 (H)   10/25/2011 138 (H)   01/28/2011 162 (H)     Calcium (mg/dL)   Date Value   08/09/2022 8.7   07/22/2022 11.9 (H)   07/13/2022 9.9       Last 3 BNP       Pro-BNP (pg/mL)   Date Value   07/22/2022 1,464 (H)   07/13/2022 764 (H)   07/07/2022 1,512 (H)          CBC: No results for input(s): WBC, HGB, PLT in the last 72 hours. BMP:    Recent Labs     08/09/22  0930      K 4.0   CL 98   CO2 26   BUN 33*   CREATININE 1.6*   GLUCOSE 234*     Hepatic: No results for input(s): AST, ALT, ALB, BILITOT, ALKPHOS in the last 72 hours. Troponin: No results for input(s): TROPONINI in the last 72 hours. BNP: No results for input(s): BNP in the last 72 hours. Lipids: No results for input(s): CHOL, HDL in the last 72 hours. Invalid input(s): LDLCALCU  INR: No results for input(s): INR in the last 72 hours. WEIGHTS:    Wt Readings from Last 3 Encounters:   08/09/22 239 lb (108.4 kg)   07/18/22 225 lb 14.4 oz (102.5 kg)   07/13/22 225 lb 4.8 oz (102.2 kg)         TELEMETRY:  Cardiac Regularity: Regular  Cardiac Rhythm/Interpretation: VPACED        ASSESSMENT:  Andrei Rocha is evolemic with stable weights  PER PT. Interventions completed this visit:  IV diuretics given no  Lab work obtained yes, YES OBTAINED BY LAB   Reviewed currently prescribed medications with patient, educated on importance of compliance and answered any questions regarding their medication  Educated on signs and symptoms of HF  Educated on low sodium diet    PLAN:  Scheduled to follow up in CHF clinic on   Future Appointments   Date Time Provider Stacie Mtz   8/15/2022 12:00 PM [facility] CHF ROOM 1 [facility]   8/15/2022  3:15 PM SCHEDULE, DEVICE CLINIC 1 [facility]   8/15/2022  3:15 PM Jose Luis Pepper MD [facility]   8/18/2022 10:00 AM [facility] [address] [facility]   8/22/2022  9:00 AM Cecy Lan MD [facility]   8/22/2022  1:30 PM Oscar Parson MD [facility]     Given clinic phone number and aware of signs and symptoms to call with any HF change in symptoms.     CHF TEACHING GIVEN, REVIEWED CARING FOR YOUR HEART, ZONE PAMPHLET, DAILY WEIGHTS, LOW SODIUM DIET. PT ADMITS TO EATING OUT FREQUENTLY, EATING CHINESE FOOD AND MEXICAN AND JAR  OF PICKLES EVERY OTHER DAY. Leah Neumann GAVE PT FOOD OPTIONS TO SUBSTITUTE PT VERBALIZED UNDERSTANDING.

## 2022-08-12 NOTE — PROGRESS NOTES
Electrophysiology Outpatient Progress Note    Winston Mendez  1955  Date of Service: 8/15/2022  Referring Provider/PCP: Collette Burrow, MD  Primary Electrophysiologist: Joe Rick MD    Chief Complaint: Pacemaker in situ and atrial fibrillation    SUBJECTIVE: Winston Mendez presents to the office today for a follow up. Since his last office visit, he reports feeling overall well and offers no complaints from a device POV. The device site looks well healed and free from infection or erosion. The patient denies any chest pain, dyspnea, palpitations, dizziness, syncope, orthopnea or paroxysmal nocturnal dyspnea. The patient continues to be followed remotely. Today his interrogation today shows a AF burden of 93.8%, in which he was unaware of and remains asymptomatic. Today he presents in AT. Options of rate control and rhythm control treatment were discussed. He opts for rate control treatment only.      Patient Active Problem List    Diagnosis Date Noted    Acute on chronic diastolic heart failure ([facility]      Priority: Medium    Hyponatremia      Priority: Medium    Normocytic anemia      Priority: Medium    Hypotension      Priority: Medium    Acute on chronic congestive heart failure ([address] 07/07/2022     Priority: Medium    At risk for colon cancer      Priority: Medium    Encounter regarding vascular access for dialysis for ESRD ([facility] 03/12/2022    Chronic hypercapnic respiratory failure ([address] 09/21/2021    Acute heart failure with preserved ejection fraction (HFpEF) ([facility] [facility] 09/17/2021    Chronic obstructive pulmonary disease, unspecified COPD type ([address] 09/13/2021    Diabetes mellitus type 2 in obese ([facility] [facility]     Obesity     Persistent atrial fibrillation ([address] 07/25/2018    Pacemaker      Overview Note:     DOI 10/3/2017  Medtronic; dual chamber; MRI conditional   Indication: Sinus node dysfunction       Sinus node dysfunction ([address] 10/03/2017    Atypical atrial flutter ([address] 09/07/2017    Restrictive lung disease secondary to obesity 07/25/2016    NIKO (obstructive sleep apnea) 08/08/2013    Hypoxemia 08/08/2013    Lung nodules 04/15/2013    Arthritis of shoulder region, left 04/15/2013    OA (osteoarthritis of the spine) 04/15/2013     Overview Note:     Lumbar      S/P laminectomy with spinal fusion 04/15/2013     Overview Note:     Done at [facility], March 2012      Colorectal polyps 04/15/2013    Diverticula of colon 04/15/2013    Essential hypertension 05/04/2011    Hypothyroidism 05/04/2011    DM (diabetes mellitus) ([facility] 01/31/2011    Class 2 obesity due to excess calories with serious comorbidity and body mass index (BMI) of 38.0 to 38.9 in adult 01/31/2011    Hyperlipidemia 01/31/2011       Current Outpatient Medications   Medication Sig Dispense Refill    acetaminophen-codeine (TYLENOL #3) 300-30 MG per tablet TAKE ONE TABLET BY MOUTH EVERY 6 HOURS AS NEEDED FOR PAIN      metOLazone (ZAROXOLYN) 2.5 MG tablet take one tablet by mouth 3 three times a week monday wednesday and friday      spironolactone (ALDACTONE) 25 MG tablet TAKE ONE TABLET (25 MG TOTAL) BY MOUTH 3 TIMES WEEKLY  MONDAY WEDNESDAY AND FRIDAY EVENING      ELIQUIS 5 MG TABS tablet Take 1 tablet by mouth in the morning and 1 tablet before bedtime. 180 tablet 1    atorvastatin (LIPITOR) 40 MG tablet TAKE ONE TABLET BY MOUTH DAILY 90 tablet 1    metoprolol succinate (TOPROL XL) 25 MG extended release tablet Take 1 tablet by mouth in the morning. 90 tablet 1    levothyroxine (SYNTHROID) 88 MCG tablet Take 1 tablet by mouth in the morning. 90 tablet 1    mometasone-formoterol (DULERA) 200-5 MCG/ACT inhaler Inhale 2 puffs into the lungs in the morning and 2 puffs before bedtime. Rinse mouth after using. PAP medication. . 3 each 2    insulin lispro, 1 Unit Dial, (HUMALOG KWIKPEN) 100 UNIT/ML SOPN Inject 20 Units into the skin in the morning and 20 Units at noon and 20 Units in the evening. Inject before meals. Do all this for 225 doses.  32 mL 1    bumetanide (BUMEX) 2 MG tablet Take 1 tablet by mouth daily 30 tablet 3    insulin glargine (LANTUS SOLOSTAR) 100 UNIT/ML injection pen Inject 55 Units into the skin nightly 15 pen 3    empagliflozin (JARDIANCE) 25 MG tablet Take 1 tablet by mouth daily 90 tablet 3    metFORMIN (GLUCOPHAGE) 500 MG tablet Take 1 tablet by mouth 2 times daily (with meals) 60 tablet 5    Potassium 99 MG TABS Take 1 tablet by mouth daily      Ferrous Gluconate-C-Folic Acid (IRON-C PO) Take by mouth      aspirin (ASPIR-LOW) 81 MG EC tablet TAKE ONE TABLET BY MOUTH EVERY DAY 90 tablet 1    OXYGEN Inhale 2.5 L into the lungs nightly (Patient taking differently: Inhale 2.5 L into the lungs nightly Spouse states uses  5 L/min via nc bedtime) 1 Device 99    Cholecalciferol (VITAMIN D3) 1000 units TABS TAKE TWO TABLETS BY MOUTH EVERY DAY 30 tablet 0    acetaminophen (TYLENOL) 500 MG tablet Take 1,000 mg by mouth daily as needed for Pain      gabapentin (NEURONTIN) 300 MG capsule TAKE ONE CAPSULE BY MOUTH TWO TIMES A DAY      Insulin Pen Needle (PEN NEEDLES) 31G X 6 MM MISC Once daily. May substitute brand for insurance coverage. 100 each 3    Insulin Pen Needle (PEN NEEDLES) 32G X 6 MM MISC Take insulin daily 100 each 1    Lancets MISC Give Delica lancets. Tests twice a day A.M. And P.M 200 each 1    blood glucose test strips (ONETOUCH VERIO) strip TEST IN THE MORNING AND IN THE EVENING 200 each 1    Misc. Devices MISC Please add portability to current O2 order. Resp to evaluate patient for OCD device. 1 each 0    Misc. Devices KIT Dispense 1 blood pressure cuff. 1 kit 0    zoster recombinant adjuvanted vaccine (SHINGRIX) 50 MCG/0.5ML SUSR injection 1 dose now and repeat in 2-6 months 0.5 mL 0     No current facility-administered medications for this visit. No Known Allergies    Review of Systems:   Constitutional: Negative for fever, activity change and appetite change.    Respiratory: Negative for cough, chest tightness, shortness of breath and wheezing. Cardiovascular: negative except as outlined in the HPI    PHYSICAL EXAM:  Vitals:    08/15/22 1507   BP: (!) 120/54   Pulse: 67   Resp: 20   Weight: 236 lb 6.4 oz (107.2 kg)   Height: 5' 7\" (1.702 m)     Constitutional: Oriented to person, place, and time. Well-developed and cooperative. Obese  Head: Normocephalic and atraumatic. Cardiovascular:  Feet appear to be well perfused. Regular rate and rhythm. No murmur. PMI is not displaced. Pulmonary/Chest: Effort normal and breath sounds normal. No respiratory distress. Musculoskeletal: Normal range of motion of all extremities, no muscle weakness. Neurological: Alert and oriented to person, place, and time. Gait normal.   Skin: Skin is warm and dry. No bruising, no ecchymosis and no rash noted. Extremity: No clubbing or cyanosis. Trace edema. Psychiatric: Normal mood and affect. Thought content normal.   PPM site: Left chest wall; site well healed. No erosion, infection or migration    TTE  5/26/2017:  Findings      Left Ventricle   Left ventricular internal dimensions were normal in diastole and systole. Borderline concentric left ventricular hypertrophy. Normal left ventricular ejection fraction. Ejection fraction is visually estimated at 70%. Right Ventricle   Mildly dilated right ventricle. Right ventricle global systolic function is low normal .      Left Atrium   The left atrium is mildly dilated. Interatrial septum appears intact. Right Atrium   Normal right atrium size. Mitral Valve   Structurally normal mitral valve. Tricuspid Valve   The tricuspid valve was not well visualized. The tricuspid valve appears structurally normal.      Aortic Valve   The aortic valve leaflets were not well visualized. The aortic valve appears moderately sclerotic. Pulmonic Valve   The pulmonic valve was not well visualized. Pericardial Effusion   No evidence of pericardial effusion. Aorta   Aortic root dimension within normal limits. Miscellaneous   Dilated Inferior Vena Cava      Conclusions      Summary   Technically suboptimal study   Left ventricular internal dimensions were normal in diastole and systole. Borderline concentric left ventricular hypertrophy. Normal left ventricular ejection fraction. The left atrium is mildly dilated. Mildly dilated right ventricle. Right ventricle global systolic function is low normal .   The aortic valve appears moderately sclerotic. Dilated Inferior Vena Cava      Signature      ----------------------------------------------------------------   Electronically signed by Kwasi Benavides MD(Interpreting   physician) on 05/26/2017 03:39 PM      Echo 10/20/20:    Findings      Left Ventricle   Normal left ventricular size and systolic function. Ejection fraction is visually estimated at 60-65%. Indeterminate diastolic function. No regional wall motion abnormalities seen. Moderate left ventricular concentric hypertrophy noted. Abnormal LV septal motion consistent with paced rhythm. Right Ventricle   Normal right ventricular size and function. Device lead visualized in right ventricle. Left Atrium   Normal sized left atrium. The interatrial septum appears intact. Right Atrium   Normal right atrial size. Device lead visualized in right atrium. Mitral Valve   Structurally normal mitral valve. No evidence of mitral valve stenosis. Mild mitral regurgitation. Tricuspid Valve   The tricuspid valve appears structurally normal.   Mild-moderate tricuspid regurgitation. RVSP is 55 mmHg. Estimated PA pressire 55/12 mmHg. Aortic Valve   Aortic valve leaflets are somewhat thickened. The left coronary cusp is focally calcified. The aortic valve is trileaflet. No hemodynamically significant aortic stenosis is present. No evidence of aortic valve regurgitation.       Pulmonic Valve   The pulmonic valve was not well visualized. No evidence of pulmonic valve stenosis. Physiologic and/or trace pulmonic regurgitation present. Pericardial Effusion   No evidence of pericardial effusion. Aorta   Aortic root dimension within normal limits. Miscellaneous   Dilated Inferior Vena Cava, normal respiratory variation suggesting mild   elevated of RA pressure, approximately 8 mmHg. Conclusions      Summary   Normal left ventricular size and systolic function. Ejection fraction is visually estimated at 60-65%. Indeterminate diastolic function. No regional wall motion abnormalities seen. Moderate left ventricular concentric hypertrophy noted. Abnormal LV septal motion consistent with paced rhythm. Normal right ventricular size and function. Mild mitral regurgitation. The left coronary cusp of the aortic valve is focally calcified. Signature      ----------------------------------------------------------------   Electronically signed by Yelitza Amaya MD(Interpreting   physician) on 10/20/2020 04:05 PM   ----------------------------------------------------------------    TTE: 9/18/2021   TTE procedure     Procedure Date  Date: 09/20/2021 Start: 02:01 PM     Study Location: Portable  Technical Quality: Limited visualization due to body habitus. Indications:Congestive heart failure. Patient Status: Routine     Contrast Medium: Definity. Amount - 2 ml     Contrast Comments: given by the rn. Height: 67 inches Weight: 260 pounds BSA: 2.26 m^2 BMI: 40.72 kg/m^2     Rhythm: Within normal limits HR: 80 bpm BP: 150/66 mmHg     Allergies    - No known allergies. Findings      Left Ventricle   Left ventricle is normal in size . Micro-bubble contrast injected to enhance left ventricular visualization. No regional wall motion abnormalities seen. Normal left ventricular ejection fraction. Ejection fraction is visually estimated at 55-60%. Indeterminate diastolic function. Right Ventricle   The right ventricle was not clearly visualized. Grossly normal right ventricular size and function. Left Atrium   Normal sized left atrium. Interatrial septum appears intact. Right Atrium   Right atrium is not clearly visualized. Mitral Valve   Structurally normal mitral valve. Tricuspid Valve   The tricuspid valve was not well visualized. Aortic Valve   The aortic valve leaflets were not well visualized. Pulmonic Valve   The pulmonic valve was not well visualized. Pericardial Effusion   No evidence of pericardial effusion. Aorta   Aortic root dimension within normal limits. Conclusions      Summary   Technically suboptimal and limited study. Left ventricle is normal in size . No regional wall motion abnormalities seen. Normal left ventricular ejection fraction.       Signature      ----------------------------------------------------------------   Electronically signed by Ulises Woodall MD(Interpreting   physician) on 09/20/2021 04:25 PM   ----------------------------------------------------------------     M-Mode/2D Measurements & Calculations      LV Diastolic    LV Systolic Dimension: 3.2   AV Cusp Separation: 2.1 cmLA   Dimension: 4.5  cm                           Dimension: 4.1 cmAO Root   cm              LV Volume Diastolic: 56.1 ml Dimension: 3.2 cm   LV FS:28.9 %    LV Volume Systolic: 59.1 ml   LV PW           LV EDV/LV EDV Index: 30.7   Diastolic: 1.4  YF/66 VU/J^0CT ESV/LV ESV   cm              Index: 40.8 ml/18ml/ m^2     RV Diastolic Dimension: 4.4   LV PW Systolic: EF Calculated: 97.7 %        cm   1.3 cm          LV Mass Index: 82 l/min*m^2   Septum          LV Length: 8.2 cm            Ascending Aorta: 3 cm   Diastolic: 0.9                               LA volume/Index: 64.4 ml   cm              LVOT: 1.9 cm                 /64ml/m^2   Septum                                       RA Area: 19 cm^2   Systolic: 1.3   cm   CO: 8.66 l/min   CI: 1.77   l/m*m^2   LV Mass: 186.39   g     Doppler Measurements & Calculations      MV Peak E-Wave:   AV Peak Velocity: 1.63 m/s    LVOT Peak Velocity: 1.01   1.29 m/s          AV Peak Gradient: 10.58 mmHg  m/s   MV Peak A-Wave:   AV Mean Velocity: 1.21 m/s    LVOT Mean Velocity: 0.76   0.46 m/s          AV Mean Gradient: 6.4 mmHg    m/s   MV E/A Ratio:     AV VTI: 25.9 cm               LVOT Peak Gradient: 4.1   2.82              AV Area (Continuity):1.93     mmHgLVOT Mean Gradient:   MV Peak Gradient: cm^2                          2.5 mmHg   8.7 mmHg   MV Mean Gradient: LVOT VTI: 17.6 cm   3.7 mmHg   MV Mean Velocity:                               TR Velocity:3.3 m/s   0.87 m/s          Pulm. Vein A Reversal         TR Gradient:43.59 mmHg   MV Deceleration   Duration:136.1 msec   Time: 137.6 msec  Pulm. Vein D Velocity:0.6   MV P1/2t: 65.9    m/sPulm. Vein A Reversal   msec              Velocity:0.27 m/s             [place] ED Velocity: 1.25 m/s   MVA by PHT:3.34   Pulm. Vein S Velocity: 0.46   cm^2              m/s   MV Area   (continuity): 1.8   cm^2   MV E' Septal   Velocity: 0.07   m/s   MV E' Lateral   Velocity: 10 m/s    Device Interrogation:   Underlying rhythm: AT Vs  Mode: DDDR   Battery Voltage/Longevity:  3.5 years    Pacing: A: 13%  RV: 59.2%    P wave: 1.0 mV  Impedance: 456 ohms   Threshold: AT  R wave: 3.4 mV  Impedance: 361 ohms   Threshold: 1.0 V @ 0.4 ms  Episodes: AF burden: 93.8%   - AT ongiing since 8/6/2022  Reprogramming included: see below  Overall device function is normal    All device programmable settings were evaluated per above and in the scanned document, along with iterative adjustments (capture thresholds) to assess and select the most appropriate final programming to provide for consistent delivery of the appropriate therapy and to verify function of the device. Assessment:     1.  Pacemaker in situ  - DOI 10/3/2017.  - Dual chamber; Medtronic; MRI conditional.  - Indication: Sinus node dysfunction   - Normal device function     2. Persistent atrial fibrillation  - [id number]= 3; On Eliquis  - Toprol XL 25mg QD  - AF burden 93.8%. - Presents in AT with CVR. - Options of rate control and rhythm control treatment were discussed. He opts for rate control treatment only. - Re-education on importance of well controlled HTN (goal BP < 130/80), adequate weight control (goal BMI of < 27), adequate glucose control (goal hemoglobin A1c < 6.5), physical activity consisting of moderate cardiopulmonary exercise up to a goal of 250 min/wk, consider sleep study regarding a formal evaluation for sleep apnea, smoking cessation and limited ETOH intake. 3. Nonsustained ventricular tachycardia  - Asymptomatic.  - Echo in May 2017 showed LV EF of 70%. - Echo 10/20/20 LVEF 60-65%   - On Toprol 25mg QD     4. Sinus node dysfunction  - S/p pacemaker implantation    5. Hypertension  - Controlled on today's visit   - On Toprol XL, Aldactone, Zaroxolyn and Bumex     6. Diabetes mellitus  - On Metformin, Jardiance and Insulin. Lab Results   Component Value Date    LABA1C 11.0 06/23/2022     No results found for: EAG  - Management per PCP      7. Restrictive lung disease  - On Dulera. - Management per pulmonary     8. Obesity   Body mass index is 37.03 kg/m². - Recommend weight loss    9. Hyperlipidemia  - On Lipitor. 10. Hypothyroidism  - On Synthroid. 11. Sleep apnea  - States compliance with his CPAP    Plan:     1. Normal pacemaker function. 2. He opts for rate control only. 3 Continue Toprol XL 25 mg daily  4. Continue Eliquis 5 mg bid. 5. Risk factor modifications. 6. Remote transmission every 3 months. 7. Follow-up in one year or sooner PRN. Advised patient to call the office regarding any questions or concerns. I have spent a total of 40 minutes with the patient and the family reviewing the above stated recommendations.   And a total of >50% of that time involved face-to-face time providing counseling and/or coordination of care with the other providers, preparation for the clinic visit, reviewing records/tests, counseling/education of the patient, ordering medications/tests/procedures, coordinating care, and documenting clinical information in the EHR. Thank you very much to allowing me to participate in the patient's care.     Aisha Gross MD  Cardiac Electrophysiology  [address]  [facility] [place]  8/15/22   3:25 PM

## 2022-08-15 ENCOUNTER — HOSPITAL ENCOUNTER (OUTPATIENT)
Dept: OTHER | Age: 67
Setting detail: THERAPIES SERIES
Discharge: HOME | End: 2022-08-15

## 2022-08-15 ENCOUNTER — OFFICE VISIT (OUTPATIENT)
Dept: NON INVASIVE DIAGNOSTICS | Age: 67
End: 2022-08-15
Payer: MEDICARE

## 2022-08-15 VITALS
RESPIRATION RATE: 20 BRPM | DIASTOLIC BLOOD PRESSURE: 54 MMHG | BODY MASS INDEX: 37.1 KG/M2 | HEIGHT: 67 IN | SYSTOLIC BLOOD PRESSURE: 120 MMHG | HEART RATE: 67 BPM | WEIGHT: 236.4 LBS

## 2022-08-15 DIAGNOSIS — Z95.0 PACEMAKER: Primary | ICD-10-CM

## 2022-08-15 DIAGNOSIS — I47.20 VT (VENTRICULAR TACHYCARDIA): ICD-10-CM

## 2022-08-15 DIAGNOSIS — I48.19 PERSISTENT ATRIAL FIBRILLATION (HCC): ICD-10-CM

## 2022-08-15 PROCEDURE — 99215 OFFICE O/P EST HI 40 MIN: CPT | Performed by: INTERNAL MEDICINE

## 2022-08-15 PROCEDURE — 1123F ACP DISCUSS/DSCN MKR DOCD: CPT | Performed by: INTERNAL MEDICINE

## 2022-08-15 PROCEDURE — 93000 ELECTROCARDIOGRAM COMPLETE: CPT | Performed by: INTERNAL MEDICINE

## 2022-08-15 RX ORDER — GABAPENTIN 300 MG/1
600 CAPSULE ORAL 2 TIMES DAILY
COMMUNITY
Start: 2022-08-12

## 2022-08-15 RX ORDER — ACETAMINOPHEN AND CODEINE PHOSPHATE 300; 30 MG/1; MG/1
TABLET ORAL
COMMUNITY
Start: 2022-07-18 | End: 2022-11-01

## 2022-08-15 RX ORDER — METOLAZONE 2.5 MG/1
TABLET ORAL
Status: ON HOLD | COMMUNITY
Start: 2022-07-04 | End: 2022-08-24 | Stop reason: HOSPADM

## 2022-08-15 RX ORDER — SPIRONOLACTONE 25 MG/1
TABLET ORAL
Status: ON HOLD | COMMUNITY
Start: 2022-08-12 | End: 2022-08-24 | Stop reason: HOSPADM

## 2022-08-21 PROBLEM — N18.32 STAGE 3B CHRONIC KIDNEY DISEASE (HCC): Status: ACTIVE | Noted: 2022-08-21

## 2022-08-22 ENCOUNTER — APPOINTMENT (OUTPATIENT)
Dept: GENERAL RADIOLOGY | Age: 67
DRG: 291 | End: 2022-08-22
Payer: MEDICARE

## 2022-08-22 ENCOUNTER — OFFICE VISIT (OUTPATIENT)
Dept: INTERNAL MEDICINE | Age: 67
DRG: 291 | End: 2022-08-22
Payer: MEDICARE

## 2022-08-22 ENCOUNTER — HOSPITAL ENCOUNTER (INPATIENT)
Age: 67
LOS: 2 days | Discharge: HOME | DRG: 291 | End: 2022-08-24
Attending: EMERGENCY MEDICINE | Admitting: INTERNAL MEDICINE
Payer: MEDICARE

## 2022-08-22 VITALS
OXYGEN SATURATION: 92 % | HEIGHT: 67 IN | BODY MASS INDEX: 38.61 KG/M2 | HEART RATE: 61 BPM | RESPIRATION RATE: 18 BRPM | TEMPERATURE: 97.1 F | WEIGHT: 246 LBS | SYSTOLIC BLOOD PRESSURE: 129 MMHG | DIASTOLIC BLOOD PRESSURE: 62 MMHG

## 2022-08-22 DIAGNOSIS — J44.9 CHRONIC OBSTRUCTIVE PULMONARY DISEASE, UNSPECIFIED COPD TYPE (HCC): ICD-10-CM

## 2022-08-22 DIAGNOSIS — I50.33 ACUTE ON CHRONIC HEART FAILURE WITH PRESERVED EJECTION FRACTION (HCC): Primary | ICD-10-CM

## 2022-08-22 DIAGNOSIS — I48.19 PERSISTENT ATRIAL FIBRILLATION (HCC): Chronic | ICD-10-CM

## 2022-08-22 DIAGNOSIS — E66.9 DIABETES MELLITUS TYPE 2 IN OBESE (HCC): ICD-10-CM

## 2022-08-22 DIAGNOSIS — E11.69 DIABETES MELLITUS TYPE 2 IN OBESE (HCC): ICD-10-CM

## 2022-08-22 DIAGNOSIS — E66.01 CLASS 3 SEVERE OBESITY DUE TO EXCESS CALORIES WITH SERIOUS COMORBIDITY AND BODY MASS INDEX (BMI) OF 40.0 TO 44.9 IN ADULT (HCC): ICD-10-CM

## 2022-08-22 DIAGNOSIS — I10 ESSENTIAL HYPERTENSION: Chronic | ICD-10-CM

## 2022-08-22 DIAGNOSIS — G47.33 OSA (OBSTRUCTIVE SLEEP APNEA): Chronic | ICD-10-CM

## 2022-08-22 DIAGNOSIS — N18.32 STAGE 3B CHRONIC KIDNEY DISEASE (HCC): ICD-10-CM

## 2022-08-22 DIAGNOSIS — I50.31 ACUTE HEART FAILURE WITH PRESERVED EJECTION FRACTION (HFPEF) (HCC): Primary | ICD-10-CM

## 2022-08-22 LAB
ALBUMIN SERPL-MCNC: 4 G/DL (ref 3.5–5.2)
ALP BLD-CCNC: 96 U/L (ref 40–129)
ALT SERPL-CCNC: 25 U/L (ref 0–40)
ANION GAP SERPL CALCULATED.3IONS-SCNC: 11 MMOL/L (ref 7–16)
AST SERPL-CCNC: 16 U/L (ref 0–39)
BASOPHILS ABSOLUTE: 0.09 E9/L (ref 0–0.2)
BASOPHILS RELATIVE PERCENT: 1 % (ref 0–2)
BILIRUB SERPL-MCNC: 0.5 MG/DL (ref 0–1.2)
BILIRUBIN URINE: NEGATIVE
BLOOD, URINE: NEGATIVE
BUN BLDV-MCNC: 29 MG/DL (ref 6–23)
CALCIUM SERPL-MCNC: 9.1 MG/DL (ref 8.6–10.2)
CHLORIDE BLD-SCNC: 104 MMOL/L (ref 98–107)
CLARITY: CLEAR
CO2: 24 MMOL/L (ref 22–29)
COLOR: YELLOW
CREAT SERPL-MCNC: 1.3 MG/DL (ref 0.7–1.2)
EKG ATRIAL RATE: 78 BPM
EKG Q-T INTERVAL: 444 MS
EKG QRS DURATION: 158 MS
EKG QTC CALCULATION (BAZETT): 489 MS
EKG R AXIS: -76 DEGREES
EKG T AXIS: 91 DEGREES
EKG VENTRICULAR RATE: 73 BPM
EOSINOPHILS ABSOLUTE: 0.34 E9/L (ref 0.05–0.5)
EOSINOPHILS RELATIVE PERCENT: 3.7 % (ref 0–6)
GFR AFRICAN AMERICAN: >60
GFR NON-AFRICAN AMERICAN: 55 ML/MIN/1.73
GLUCOSE BLD-MCNC: 158 MG/DL (ref 74–99)
GLUCOSE URINE: 500 MG/DL
HCT VFR BLD CALC: 32.6 % (ref 37–54)
HEMOGLOBIN: 10.3 G/DL (ref 12.5–16.5)
IMMATURE GRANULOCYTES #: 0.38 E9/L
IMMATURE GRANULOCYTES %: 4.1 % (ref 0–5)
KETONES, URINE: NEGATIVE MG/DL
LACTIC ACID: 1.5 MMOL/L (ref 0.5–2.2)
LEUKOCYTE ESTERASE, URINE: NEGATIVE
LYMPHOCYTES ABSOLUTE: 1.27 E9/L (ref 1.5–4)
LYMPHOCYTES RELATIVE PERCENT: 13.8 % (ref 20–42)
MCH RBC QN AUTO: 31.5 PG (ref 26–35)
MCHC RBC AUTO-ENTMCNC: 31.6 % (ref 32–34.5)
MCV RBC AUTO: 99.7 FL (ref 80–99.9)
METER GLUCOSE: 163 MG/DL (ref 74–99)
MONOCYTES ABSOLUTE: 0.77 E9/L (ref 0.1–0.95)
MONOCYTES RELATIVE PERCENT: 8.4 % (ref 2–12)
NEUTROPHILS ABSOLUTE: 6.33 E9/L (ref 1.8–7.3)
NEUTROPHILS RELATIVE PERCENT: 69 % (ref 43–80)
NITRITE, URINE: NEGATIVE
PDW BLD-RTO: 17.4 FL (ref 11.5–15)
PH UA: 5.5 (ref 5–9)
PLATELET # BLD: 190 E9/L (ref 130–450)
PMV BLD AUTO: 9.4 FL (ref 7–12)
POTASSIUM REFLEX MAGNESIUM: 4.4 MMOL/L (ref 3.5–5)
PRO-BNP: 2079 PG/ML (ref 0–125)
PROTEIN UA: NEGATIVE MG/DL
RBC # BLD: 3.27 E12/L (ref 3.8–5.8)
SODIUM BLD-SCNC: 139 MMOL/L (ref 132–146)
SPECIFIC GRAVITY UA: 1.01 (ref 1–1.03)
TOTAL PROTEIN: 7.6 G/DL (ref 6.4–8.3)
TROPONIN, HIGH SENSITIVITY: 27 NG/L (ref 0–11)
TROPONIN, HIGH SENSITIVITY: 29 NG/L (ref 0–11)
UROBILINOGEN, URINE: 0.2 E.U./DL
WBC # BLD: 9.2 E9/L (ref 4.5–11.5)

## 2022-08-22 PROCEDURE — 93005 ELECTROCARDIOGRAM TRACING: CPT | Performed by: PHYSICIAN ASSISTANT

## 2022-08-22 PROCEDURE — 96374 THER/PROPH/DIAG INJ IV PUSH: CPT

## 2022-08-22 PROCEDURE — 2580000003 HC RX 258: Performed by: STUDENT IN AN ORGANIZED HEALTH CARE EDUCATION/TRAINING PROGRAM

## 2022-08-22 PROCEDURE — 2500000003 HC RX 250 WO HCPCS: Performed by: STUDENT IN AN ORGANIZED HEALTH CARE EDUCATION/TRAINING PROGRAM

## 2022-08-22 PROCEDURE — 1123F ACP DISCUSS/DSCN MKR DOCD: CPT | Performed by: STUDENT IN AN ORGANIZED HEALTH CARE EDUCATION/TRAINING PROGRAM

## 2022-08-22 PROCEDURE — 85025 COMPLETE CBC W/AUTO DIFF WBC: CPT

## 2022-08-22 PROCEDURE — 83605 ASSAY OF LACTIC ACID: CPT

## 2022-08-22 PROCEDURE — 2060000000 HC ICU INTERMEDIATE R&B

## 2022-08-22 PROCEDURE — 6370000000 HC RX 637 (ALT 250 FOR IP): Performed by: STUDENT IN AN ORGANIZED HEALTH CARE EDUCATION/TRAINING PROGRAM

## 2022-08-22 PROCEDURE — S5553 INSULIN LONG ACTING 5 U: HCPCS | Performed by: STUDENT IN AN ORGANIZED HEALTH CARE EDUCATION/TRAINING PROGRAM

## 2022-08-22 PROCEDURE — 3046F HEMOGLOBIN A1C LEVEL >9.0%: CPT | Performed by: STUDENT IN AN ORGANIZED HEALTH CARE EDUCATION/TRAINING PROGRAM

## 2022-08-22 PROCEDURE — 71046 X-RAY EXAM CHEST 2 VIEWS: CPT

## 2022-08-22 PROCEDURE — 99212 OFFICE O/P EST SF 10 MIN: CPT | Performed by: STUDENT IN AN ORGANIZED HEALTH CARE EDUCATION/TRAINING PROGRAM

## 2022-08-22 PROCEDURE — 99214 OFFICE O/P EST MOD 30 MIN: CPT | Performed by: STUDENT IN AN ORGANIZED HEALTH CARE EDUCATION/TRAINING PROGRAM

## 2022-08-22 PROCEDURE — 84484 ASSAY OF TROPONIN QUANT: CPT

## 2022-08-22 PROCEDURE — 36415 COLL VENOUS BLD VENIPUNCTURE: CPT

## 2022-08-22 PROCEDURE — 99285 EMERGENCY DEPT VISIT HI MDM: CPT

## 2022-08-22 PROCEDURE — 83880 ASSAY OF NATRIURETIC PEPTIDE: CPT

## 2022-08-22 PROCEDURE — 82962 GLUCOSE BLOOD TEST: CPT

## 2022-08-22 PROCEDURE — 80053 COMPREHEN METABOLIC PANEL: CPT

## 2022-08-22 PROCEDURE — 81003 URINALYSIS AUTO W/O SCOPE: CPT

## 2022-08-22 RX ORDER — SODIUM CHLORIDE 0.9 % (FLUSH) 0.9 %
5-40 SYRINGE (ML) INJECTION EVERY 12 HOURS SCHEDULED
Status: DISCONTINUED | OUTPATIENT
Start: 2022-08-22 | End: 2022-08-24 | Stop reason: HOSPADM

## 2022-08-22 RX ORDER — POLYETHYLENE GLYCOL 3350 17 G/17G
17 POWDER, FOR SOLUTION ORAL DAILY PRN
Status: DISCONTINUED | OUTPATIENT
Start: 2022-08-22 | End: 2022-08-24 | Stop reason: HOSPADM

## 2022-08-22 RX ORDER — ARFORMOTEROL TARTRATE 15 UG/2ML
15 SOLUTION RESPIRATORY (INHALATION) 2 TIMES DAILY
Status: DISCONTINUED | OUTPATIENT
Start: 2022-08-22 | End: 2022-08-24 | Stop reason: HOSPADM

## 2022-08-22 RX ORDER — INSULIN LISPRO 100 [IU]/ML
10 INJECTION, SOLUTION INTRAVENOUS; SUBCUTANEOUS
Status: DISCONTINUED | OUTPATIENT
Start: 2022-08-22 | End: 2022-08-24 | Stop reason: HOSPADM

## 2022-08-22 RX ORDER — LEVOTHYROXINE SODIUM 88 UG/1
88 TABLET ORAL DAILY
Status: DISCONTINUED | OUTPATIENT
Start: 2022-08-23 | End: 2022-08-24 | Stop reason: HOSPADM

## 2022-08-22 RX ORDER — HYDROCODONE BITARTRATE AND ACETAMINOPHEN 5; 325 MG/1; MG/1
1 TABLET ORAL EVERY 8 HOURS PRN
Status: DISCONTINUED | OUTPATIENT
Start: 2022-08-22 | End: 2022-08-24 | Stop reason: HOSPADM

## 2022-08-22 RX ORDER — ASPIRIN 81 MG/1
TABLET ORAL
Qty: 90 TABLET | Refills: 1 | Status: CANCELLED | OUTPATIENT
Start: 2022-08-22

## 2022-08-22 RX ORDER — GABAPENTIN 300 MG/1
300 CAPSULE ORAL 2 TIMES DAILY
Status: DISCONTINUED | OUTPATIENT
Start: 2022-08-22 | End: 2022-08-24 | Stop reason: HOSPADM

## 2022-08-22 RX ORDER — DEXTROSE MONOHYDRATE 100 MG/ML
INJECTION, SOLUTION INTRAVENOUS CONTINUOUS PRN
Status: DISCONTINUED | OUTPATIENT
Start: 2022-08-22 | End: 2022-08-24 | Stop reason: HOSPADM

## 2022-08-22 RX ORDER — INSULIN GLARGINE-YFGN 100 [IU]/ML
40 INJECTION, SOLUTION SUBCUTANEOUS NIGHTLY
Status: DISCONTINUED | OUTPATIENT
Start: 2022-08-22 | End: 2022-08-24 | Stop reason: HOSPADM

## 2022-08-22 RX ORDER — INSULIN LISPRO 100 [IU]/ML
0-4 INJECTION, SOLUTION INTRAVENOUS; SUBCUTANEOUS NIGHTLY
Status: DISCONTINUED | OUTPATIENT
Start: 2022-08-22 | End: 2022-08-24 | Stop reason: HOSPADM

## 2022-08-22 RX ORDER — INSULIN LISPRO 100 [IU]/ML
0-4 INJECTION, SOLUTION INTRAVENOUS; SUBCUTANEOUS
Status: DISCONTINUED | OUTPATIENT
Start: 2022-08-23 | End: 2022-08-24 | Stop reason: HOSPADM

## 2022-08-22 RX ORDER — METOPROLOL SUCCINATE 25 MG/1
25 TABLET, EXTENDED RELEASE ORAL DAILY
Status: DISCONTINUED | OUTPATIENT
Start: 2022-08-23 | End: 2022-08-22

## 2022-08-22 RX ORDER — SODIUM CHLORIDE 0.9 % (FLUSH) 0.9 %
5-40 SYRINGE (ML) INJECTION PRN
Status: DISCONTINUED | OUTPATIENT
Start: 2022-08-22 | End: 2022-08-24 | Stop reason: HOSPADM

## 2022-08-22 RX ORDER — BUMETANIDE 1 MG/1
2 TABLET ORAL DAILY
Status: DISCONTINUED | OUTPATIENT
Start: 2022-08-23 | End: 2022-08-24 | Stop reason: HOSPADM

## 2022-08-22 RX ORDER — ZOSTER VACCINE RECOMBINANT, ADJUVANTED 50 MCG/0.5
0.5 KIT INTRAMUSCULAR SEE ADMIN INSTRUCTIONS
Qty: 0.5 ML | Refills: 0 | Status: CANCELLED | OUTPATIENT
Start: 2022-08-22 | End: 2022-08-23

## 2022-08-22 RX ORDER — BUMETANIDE 0.25 MG/ML
1 INJECTION, SOLUTION INTRAMUSCULAR; INTRAVENOUS ONCE
Status: COMPLETED | OUTPATIENT
Start: 2022-08-22 | End: 2022-08-22

## 2022-08-22 RX ORDER — ACETAMINOPHEN 650 MG/1
650 SUPPOSITORY RECTAL EVERY 6 HOURS PRN
Status: DISCONTINUED | OUTPATIENT
Start: 2022-08-22 | End: 2022-08-24 | Stop reason: HOSPADM

## 2022-08-22 RX ORDER — ATORVASTATIN CALCIUM 40 MG/1
40 TABLET, FILM COATED ORAL DAILY
Status: DISCONTINUED | OUTPATIENT
Start: 2022-08-23 | End: 2022-08-24 | Stop reason: HOSPADM

## 2022-08-22 RX ORDER — ONDANSETRON 2 MG/ML
4 INJECTION INTRAMUSCULAR; INTRAVENOUS EVERY 6 HOURS PRN
Status: DISCONTINUED | OUTPATIENT
Start: 2022-08-22 | End: 2022-08-24 | Stop reason: HOSPADM

## 2022-08-22 RX ORDER — BUDESONIDE 0.5 MG/2ML
0.5 INHALANT ORAL 2 TIMES DAILY
Status: DISCONTINUED | OUTPATIENT
Start: 2022-08-22 | End: 2022-08-24 | Stop reason: HOSPADM

## 2022-08-22 RX ORDER — ONDANSETRON 4 MG/1
4 TABLET, ORALLY DISINTEGRATING ORAL EVERY 8 HOURS PRN
Status: DISCONTINUED | OUTPATIENT
Start: 2022-08-22 | End: 2022-08-24 | Stop reason: HOSPADM

## 2022-08-22 RX ORDER — SODIUM CHLORIDE 9 MG/ML
INJECTION, SOLUTION INTRAVENOUS PRN
Status: DISCONTINUED | OUTPATIENT
Start: 2022-08-22 | End: 2022-08-24 | Stop reason: HOSPADM

## 2022-08-22 RX ORDER — ACETAMINOPHEN 325 MG/1
650 TABLET ORAL EVERY 6 HOURS PRN
Status: DISCONTINUED | OUTPATIENT
Start: 2022-08-22 | End: 2022-08-24 | Stop reason: HOSPADM

## 2022-08-22 RX ADMIN — SODIUM CHLORIDE, PRESERVATIVE FREE 5 ML: 5 INJECTION INTRAVENOUS at 22:00

## 2022-08-22 RX ADMIN — BUMETANIDE 1 MG: 0.25 INJECTION INTRAMUSCULAR; INTRAVENOUS at 17:09

## 2022-08-22 RX ADMIN — INSULIN GLARGINE-YFGN 40 UNITS: 100 INJECTION, SOLUTION SUBCUTANEOUS at 23:09

## 2022-08-22 RX ADMIN — APIXABAN 5 MG: 5 TABLET, FILM COATED ORAL at 23:08

## 2022-08-22 RX ADMIN — BUMETANIDE 1 MG: 0.25 INJECTION INTRAMUSCULAR; INTRAVENOUS at 23:08

## 2022-08-22 RX ADMIN — GABAPENTIN 300 MG: 300 CAPSULE ORAL at 23:08

## 2022-08-22 ASSESSMENT — ENCOUNTER SYMPTOMS
SHORTNESS OF BREATH: 1
VOMITING: 0
RHINORRHEA: 0
BLOOD IN STOOL: 0
DIARRHEA: 0
COUGH: 1
ABDOMINAL PAIN: 0
VOMITING: 0
COUGH: 0
NAUSEA: 0
VOICE CHANGE: 0
DIARRHEA: 0
ABDOMINAL PAIN: 0
BACK PAIN: 1
WHEEZING: 1
CONSTIPATION: 0
TROUBLE SWALLOWING: 0
RHINORRHEA: 0
SHORTNESS OF BREATH: 1
ABDOMINAL DISTENTION: 1
NAUSEA: 0

## 2022-08-22 ASSESSMENT — PAIN - FUNCTIONAL ASSESSMENT: PAIN_FUNCTIONAL_ASSESSMENT: NONE - DENIES PAIN

## 2022-08-22 NOTE — ED NOTES
Department of Emergency Medicine    FIRST PROVIDER TRIAGE NOTE             Independent MLP           8/22/22  12:15 PM EDT    Date of Encounter: 8/22/22   MRN: [id number]    Vitals:    08/22/22 1149 08/22/22 1216   BP:  132/67   Pulse: 60    Resp:  20   Temp: 97.8 °F (36.6 °C)    TempSrc: Oral    SpO2: 95%       HPI: May Malone is a 69 y.o. male who presents to the ED for Shortness of Breath (Pt states \" I cant breathe\". Pt reports symptoms for couple days. Pt was at clinic and sent over. Pt family states \" he is full of water\")   Also reports worsening leg swelling     ROS: Negative for cp, abd pain, vomiting, diarrhea, or urinary complaints. Physical Exam:   Gen Appearance/Constitutional: alert  CV: regular rate  Pulm: abnormal breath sounds auscultated     Initial Plan of Care: All treatment areas with department are currently occupied. Plan to order/Initiate the following while awaiting opening in ED: labs, EKG, and imaging studies.     Initial Plan of Care: Initiate Treatment-Testing, Proceed toTreatment Area When Bed Available for ED Attending/MLP to Continue Care    Electronically signed by Umu Coates PA-C   DD: 8/22/22       Umu Coates PA-C  08/22/22 1216

## 2022-08-22 NOTE — PROGRESS NOTES
Ankur Rocha (:  1955) is a 69 y.o. male,Established patient, here for evaluation of the following chief complaint(s):  Diabetes ( this morning fasting) and Hypertension         ASSESSMENT/PLAN:  1. Acute heart failure with preserved ejection fraction (HFpEF) ([facility] [address]  21 EF 55-60%   22 ProBNP 1,936, 1 month ago it was 1,464  Nephro recs: continue 1 L fluid restriction     Patient sent to ED for admission as patient likely needs IV diuretics. Spoke with ED resident 11:02 AM for patient admission, informed house team resident and admitting attending. Referred patient to social work for financial assistance with copays so patient can continue to be compliant with following up at CHF clinic and help prevent hospital admissions  -     [facility] Referral to Social Work (Primary Care Only)    2. Essential hypertension  Controlled   SBP trend 120-150, moderately controlled  On ACEI/ARB? K level: 4.0  Continue meds  Discussed low salt intake and exercising    3. Persistent atrial fibrillation (HCC)  Continue eliquis 5 mg BID  Follows up with Dr. Dung Chairez - last seen 8/15/22    4. Diabetes mellitus type 2 in obese (HCC)  A1C (22): 11.0 (from 7.0 on 3/11/22)  22 Cr 1.6 improved  Continue jardiance 25 mg daily  Continue metformin 500 mg BID  Continue Lantus increased to 55 u nightly and lispro 20 u premeals  Follows with eye clinic on [facility] eye clinic yearly     5. Chronic obstructive pulmonary disease, unspecified COPD type ([facility]  Continue dulera    6. NIKO (obstructive sleep apnea)  Doesn't wear CPAP at night, can't tolerate wearing a mask. He uses 4 L O2 instead    7. Class 3 severe obesity due to excess calories with serious comorbidity and body mass index (BMI) of 40.0 to 44.9 in adult ([address]  8. Stage 3b chronic kidney disease ([facility] [address]      8. Stage 3b chronic kidney disease ([facility] [address]  22 Cr 1.6 improved, GFR 43      No follow-ups on file.          Subjective   SUBJECTIVE/OBJECTIVE:  Patient and atraumatic. Eyes:      General: No scleral icterus. Conjunctiva/sclera: Conjunctivae normal.   Neck:      Vascular: No carotid bruit. Comments: Cannot assess neck for JVD due to wide neck circumference  Cardiovascular:      Rate and Rhythm: Normal rate and regular rhythm. Pulses: Normal pulses. Heart sounds: Normal heart sounds. No murmur heard. Pulmonary:      Effort: Respiratory distress present. Breath sounds: No stridor. Rales present. No wheezing or rhonchi. Comments: Bilateral crackles in mid and lower lung fields  SOB when walking and when lying down for >5 min at 45 degree angle  Abdominal:      General: Bowel sounds are normal. There is distension. Palpations: There is no mass. Tenderness: There is no abdominal tenderness. There is no guarding or rebound. Comments: Rigid abdomen with multiple bruises   Musculoskeletal:         General: No tenderness. Right lower leg: Edema present. Left lower leg: Edema present. Comments: +2 to +3 pitting edema bilaterally up to below knees   Lymphadenopathy:      Cervical: No cervical adenopathy. Skin:     General: Skin is warm and dry. Neurological:      General: No focal deficit present. Mental Status: He is alert and oriented to person, place, and time. Gait: Gait normal.   Psychiatric:         Behavior: Behavior normal.         Thought Content: Thought content normal.          On this date 8/22/2022 I have spent 25 minutes reviewing previous notes, test results and face to face with the patient discussing the diagnosis and importance of compliance with the treatment plan as well as documenting on the day of the visit. An electronic signature was used to authenticate this note.     --Andre Garrett MD

## 2022-08-22 NOTE — PROGRESS NOTES
[facility]  [facility]    Attending Physician Statement  I have discussed the case, including pertinent history and exam findings with the resident physician. I have seen and examined the patient and the key elements of the encounter have been performed by me. I agree with the assessment, plan and orders as documented by the resident. I have reviewed all pertinent PMHx, PSHx, FamHx, SocialHx, medications, and allergies and updated history as appropriate. Patient presents for routine follow up of medical problems. HFpEF - admitted one month ago and was started on Entresto but did not tolerate. He was discharged with Bumex daily. Today, he reports that he saw Dr. Kylah Pandya. At that time, metolazone and aldactone were added. Missed CHF clinic appointment on 8/15/2022. Could not afford the co-pay. Yesterday, required oxygen overnight. Today, took both medications. Reports a weight gain of 15 pounds on Friday. Has not missed medication doses. We have documented a 10 pound gain since 8/15/2022. Patient with acute exacerbation of HFpEF. Based on clinical requirement for increased Oxygen, increased SOB, pitting edema, weight gain, as well as crackles on exam, will admit patient for IV diuresis. Will also need  follow-up for financial assistance as patient missing CHF appointments due to co-pay difficulty. Vitals:    08/22/22 0912   BP: 129/62   Site: Left Upper Arm   Position: Sitting   Cuff Size: Medium Adult   Pulse: 61   Resp: 18   Temp: 97.1 °F (36.2 °C)   TempSrc: Temporal   SpO2: 92%   Weight: 246 lb (111.6 kg)   Height: 5' 7\" (1.702 m)   Lungs:  B crackles with wheezing in upper lung fields anteriorly. Neck:   No carotid bruits appreciated B. Unable to appreciate JVD due to neck thickness. CVS:  +s1/s2 without m/g/r appreciated.     Abd:  + BS, NTND, No renal or aortic bruits   Extr:  2+ DP/PT pulses B, 3+ pitting edema to knees B. Remainder of medical problems as per resident note.     Heidy Yeung MD  8/22/2022 10:13 AM

## 2022-08-22 NOTE — ED NOTES
Patient resting at side of the bed, patient given pudding and cranberry juice. Patient states no needs at this time.  Vitals per chart      Daniela Gonzalez RN  08/22/22 8338

## 2022-08-22 NOTE — H&P
[facility]  [facility] Medicine Residency [facility]  History and Physical    Patient:  Winston Eye 69 y.o. male MRN: [id number]     Date of Service: 8/22/2022    Hospital Day: 1      Chief complaint: had concerns including Shortness of Breath (Pt states \" I cant breathe\". Pt reports symptoms for couple days. Pt was at clinic and sent over. Pt family states \" he is full of water\"). History of Present Illness   The patient is a 69 y.o. male with a past medical history of HFpEF, HTN, afib s/p pacemaker, T2DM, COPD, NIKO, CKD3b, and obesity who presents with increased shortness of breath over the past couple days. He has noticed increased shortness of breath over the since yesterday when sitting. He has had increased dyspnea while walking for the past 4 days. He sleeps in a recliner at home, uses 4L of O2 while sleeping, and has not noticed increased shortness of breath when trying to sleep. He is compliant with medications, and his wife helps him manage his medications. He was seen today in IM clinic today with concerns for CHF exacerbation, he was sent to the ED. Last week he had an appointment with Dr. Roro Abdi who continued his medications of bumex and started metolazone and aldactone. That same day he missed a CHF clinic appointment because he could not afford the copay. He has had a 10lb weight gain in the past week and 21lb weight gain since discharged from his last admission on 7/14/22. He denies chest pain, palpitations, cough, fever, chills, abdominal pain, nausea, vomiting, or diarrhea.      Past Medical History:      Diagnosis Date    RADHA (acute kidney injury) ([address] 3/10/2022    Atrial fibrillation ([address]     Carpal tunnel syndrome     Encounter regarding vascular access for dialysis for ESRD ([facility] [address] 3/12/2022    Hyperlipidemia     Hypertension     Hypothyroidism     Lung nodule     Nocturnal hypoxemia due to obesity 8/8/2013    Obesity     NIKO (obstructive sleep apnea) 8/8/2013    [facility]    Osteoarthritis     Pinched nerve     Lumbar    Restrictive lung disease secondary to obesity 7/25/2016    Sinus node dysfunction (HCC)     Type II or unspecified type diabetes mellitus without mention of complication, not stated as uncontrolled        Past Surgical History:        Procedure Laterality Date    BACK SURGERY  2012    [facility]. Pinched nerve in back    BACK SURGERY  05/30/2017    COLONOSCOPY  2007    multiple colon polyps    COLONOSCOPY  06/04/2012    COLONOSCOPY    COLONOSCOPY  04/26/2022    polyp; diverticula--sarah    COLONOSCOPY N/A 4/26/2022    COLONOSCOPY POLYPECTOMY HOT BIOPSY (Snare) performed by Adam Virk MD at [address]      lennie cataracts implant    HERNIA REPAIR      umbilical    PACEMAKER PLACEMENT  10/03/2017    Medtronic dual chamber    Dr. Mylene Morales Right        Medications Prior to Admission:    Prior to Admission medications    Medication Sig Start Date End Date Taking? Authorizing Provider   acetaminophen-codeine (TYLENOL #3) 300-30 MG per tablet TAKE ONE TABLET BY MOUTH EVERY 6 HOURS AS NEEDED FOR PAIN 7/18/22   Historical Provider, MD   gabapentin (NEURONTIN) 300 MG capsule TAKE ONE CAPSULE BY MOUTH TWO TIMES A DAY 8/12/22   Historical Provider, MD   metOLazone (ZAROXOLYN) 2.5 MG tablet take one tablet by mouth 3 three times a week monday wednesday and friday 7/4/22   Historical Provider, MD   spironolactone (ALDACTONE) 25 MG tablet TAKE ONE TABLET (25 MG TOTAL) BY MOUTH 3 TIMES WEEKLY  MONDAY WEDNESDAY AND FRIDAY EVENING 8/12/22   Historical Provider, MD   ELIQUIS 5 MG TABS tablet Take 1 tablet by mouth in the morning and 1 tablet before bedtime. 7/18/22   Saturnino Rosenbaum MD   atorvastatin (LIPITOR) 40 MG tablet TAKE ONE TABLET BY MOUTH DAILY 7/18/22   Saturnino Rosenbaum MD   metoprolol succinate (TOPROL XL) 25 MG extended release tablet Take 1 tablet by mouth in the morning.  7/18/22   Saturnino Rosenbaum MD levothyroxine (SYNTHROID) 88 MCG tablet Take 1 tablet by mouth in the morning. 7/18/22 1/14/23  Christopher Cordova MD   mometasone-formoterol [facility]) 200-5 MCG/ACT inhaler Inhale 2 puffs into the lungs in the morning and 2 puffs before bedtime. Rinse mouth after using. PAP medication. . 7/18/22   Christopher Cordova MD   insulin lispro, 1 Unit Dial, (HUMALOG KWIKPEN) 100 UNIT/ML SOPN Inject 20 Units into the skin in the morning and 20 Units at noon and 20 Units in the evening. Inject before meals. Do all this for 225 doses. 7/18/22 10/1/22  Christopher Cordova MD   bumetanide (BUMEX) 2 MG tablet Take 1 tablet by mouth daily 7/14/22   Melani Mcclendon MD   insulin glargine (LANTUS SOLOSTAR) 100 UNIT/ML injection pen Inject 55 Units into the skin nightly 7/13/22   Adilia Morrissey MD   Insulin Pen Needle (PEN NEEDLES) 31G X 6 MM MISC Once daily. May substitute brand for insurance coverage. 7/13/22   Adilia Morrissey MD   empagliflozin (JARDIANCE) 25 MG tablet Take 1 tablet by mouth daily 6/23/22 6/23/23  Fan [facility]   metFORMIN (GLUCOPHAGE) 500 MG tablet Take 1 tablet by mouth 2 times daily (with meals) 5/26/22   Rajinder Arroyo MD   Potassium 99 MG TABS Take 1 tablet by mouth daily    Historical Provider, MD   Ferrous Gluconate-C-Folic Acid (IRON-C PO) Take by mouth    Historical Provider, MD   aspirin (ASPIR-LOW) 81 MG EC tablet TAKE ONE TABLET BY MOUTH EVERY DAY 2/28/22   Camille Pritchett MD   Insulin Pen Needle (PEN NEEDLES) 32G X 6 MM MISC Take insulin daily 2/28/22   Camille Pritchett MD   Lancets MISC Give Delica lancets. Tests twice a day A.M. And P.M 2/28/22   Camille Pritchett MD   blood glucose test strips (ONETOUCH VERIO) strip TEST IN THE MORNING AND IN THE EVENING 2/28/22   Camille Pricthett MD   Misc. Devices MISC Please add portability to current O2 order. Resp to evaluate patient for OCD device. 9/15/21   Breezy Savage MD   Misc.  Devices KIT Dispense 1 blood pressure cuff. 2/23/21   Yokasta Nadia Walton MD   OXYGEN Inhale 2.5 L into the lungs nightly  Patient taking differently: Inhale 2.5 L into the lungs nightly Spouse states uses  5 L/min via nc bedtime 7/28/20   Jenna Ford MD   zoster recombinant adjuvanted vaccine [facility]) 50 MCG/0.5ML SUSR injection 1 dose now and repeat in 2-6 months  Patient not taking: Reported on 8/22/2022 7/19/19   Jenna Ford MD   acetaminophen (TYLENOL) 500 MG tablet Take 1,000 mg by mouth daily as needed for Pain    Historical Provider, MD       Allergies:  Patient has no known allergies. Social History:   TOBACCO:   reports that he quit smoking about 17 years ago. His smoking use included cigarettes. He has a 3.50 pack-year smoking history. He quit smokeless tobacco use about 18 years ago. ETOH:   reports no history of alcohol use. Family History:       Problem Relation Age of Onset    Cancer Mother         skin    Heart Disease Mother     High Blood Pressure Father     Amyotrophic lateral sclerosis Father     Cancer Brother     High Blood Pressure Brother     Diabetes Brother        REVIEW OF SYSTEMS:    Constitutional: + weight change. No fever, no chills; good appetite  HEENT: No blurred vision, no ear problems, no sore throat, no rhinorrhea. Respiratory: +shortness of breath No cough, no sputum production, no pleuritic chest pain  Cardiology: + dyspnea on exertion; no angina, no paroxysmal nocturnal dyspnea, no orthopnea, no palpitation, no leg swelling. Gastroenterology: No dysphagia, no reflux; no abdominal pain, no nausea or vomiting; no constipation or diarrhea.  No hematochezia   Genitourinary: No dysuria, no frequency, hesitancy; no hematuria  Musculoskeletal: no joint pain, no myalgia, no change in range of movement  Neurology: no focal weakness in extremities, no slurred speech, no double vision, no tingling or numbness sensation  Endocrinology: no temperature intolerance, no polyphagia, polydipsia or polyuria  Hematology: no increased bleeding, no bruising, no lymphadenopathy  Skin: no skin changes noticed by patient  Psychology: no depressed mood, no suicidal ideation    Physical Exam   Vitals: /65   Pulse 61   Temp 97.8 °F (36.6 °C) (Oral)   Resp 18   SpO2 100%   Physical Exam  Constitutional:       General: He is not in acute distress. Appearance: Normal appearance. He is obese. He is not ill-appearing. HENT:      Head: Normocephalic and atraumatic. Neck:      Vascular: No JVD. Cardiovascular:      Rate and Rhythm: Normal rate and regular rhythm. Pulses: Normal pulses. Heart sounds: Normal heart sounds. Pulmonary:      Effort: Pulmonary effort is normal.      Comments: Bilateral crackles   Abdominal:      General: There is distension. Palpations: Abdomen is soft. Tenderness: There is no abdominal tenderness. Musculoskeletal:      Cervical back: Neck supple. Right lower leg: Edema present. Left lower leg: Edema present. Skin:     General: Skin is warm and dry. Neurological:      General: No focal deficit present. Mental Status: He is alert and oriented to person, place, and time. Psychiatric:         Mood and Affect: Mood normal.         Behavior: Behavior normal.         Thought Content:  Thought content normal.         Judgment: Judgment normal.       Labs and Imaging Studies   Basic Labs  Recent Labs     08/22/22  1238      K 4.4      CO2 24   BUN 29*   CREATININE 1.3*   GLUCOSE 158*   CALCIUM 9.1       Recent Labs     08/22/22  1238   WBC 9.2   RBC 3.27*   HGB 10.3*   HCT 32.6*   MCV 99.7   MCH 31.5   MCHC 31.6*   RDW 17.4*      MPV 9.4         Imaging Studies:  XR CHEST (2 VW)   Final Result   Cardiomegaly with mild interstitial pulmonary edema which is slightly more   pronounced than on the previous exam.              EKG: ventricular paced rhythm    Resident's Assessment and Plan     Assessment and Plan:    Shortness of breath 2/2 CHF exacerbation, history of HFpEF  Echo 9/20/21 with EF of 55%  Up 21 lbs since 7/14/22  Increased dyspnea on exertion and O2 requirement, compliant with medications   PLAN:   Repeat ECHO, follow  Bumex 2 mg IV   Continue home medications: bumex 2mg daily, aldactone and metolazone  Monitor BMP   T2DM   A1c on 11.0  PLAN:   Insulin glargine 40u nightly   Insulin lispro 10u before meal   LDSS   Monitor blood glucose   CKD Stage 3b  Cr 1.3, at baseline  Monitor Cr   Hx of HLD  Continue atorvastatin 40   Hx of obesity hypoventilation syndrome  Home 4L by NC at night, continue   Hx of persistent atrial fibrillation  Continue apixaban 5 mg  Hx of HTN   Continue metoprolol succinate 25 mg  Hx hypothyroidism  Continue home levothyroxine 88 mcg  Hx sinus node dysfunction s/p pacemaker in 2017      PT/OT evaluation: pending further clinical evaluation   DVT prophylaxis/ GI prophylaxis: eliquis   Disposition: admit to internal medicine    Ronak Edmondson MD, PGY-1  Attending physician: Dr. Norma Michael

## 2022-08-22 NOTE — ED PROVIDER NOTES
Patient is a 49-year-old male with a history of heart failure with preserved ejection fraction, A. fib, diabetes, CKD who presents to the emergency department with shortness of breath. Patient presents with his wife, stating that he has difficulty breathing x2 days. Patient has been taking his diuretics including Bumex 2 mg daily. Patient states he has not increased weight gain. Patient is also on nasal cannula oxygen 4 L at night and then during the day as needed. Patient states that he has had increasing shortness of breath especially on exertion over the past 2 days. Patient denies any chest pain. Patient endorses swelling to bilateral ankles that has been increasing. Patient has increased his oxygen to 5 L/min. Patient denies any headache, abdominal pain, urinary or GI symptoms. Patient was seen in the outpatient internal medicine clinic this morning and referred to the emergency department for fluid overload and likely admission. Review of Systems   Constitutional:  Negative for chills and fever. HENT:  Negative for congestion, rhinorrhea, trouble swallowing and voice change. Eyes:  Negative for visual disturbance. Respiratory:  Positive for shortness of breath. Negative for cough. Cardiovascular:  Positive for leg swelling. Negative for chest pain and palpitations. Gastrointestinal:  Positive for abdominal distention. Negative for abdominal pain, diarrhea, nausea and vomiting. Endocrine: Negative for polyuria. Genitourinary:  Negative for dysuria, frequency, hematuria and urgency. Musculoskeletal:  Negative for arthralgias and myalgias. Skin:  Negative for rash and wound. Allergic/Immunologic: Negative for immunocompromised state. Neurological:  Negative for dizziness, syncope, weakness, light-headedness, numbness and headaches. Psychiatric/Behavioral:  Negative for confusion. The patient is not nervous/anxious.        Physical Exam  Vitals and nursing note reviewed. Constitutional:       General: He is awake. He is not in acute distress. Appearance: He is ill-appearing. Comments: With conversational dyspnea speaking in 2-3 word sentences   HENT:      Head: Normocephalic and atraumatic. Mouth/Throat:      Mouth: Mucous membranes are moist.   Eyes:      Extraocular Movements: Extraocular movements intact. Pupils: Pupils are equal, round, and reactive to light. Cardiovascular:      Rate and Rhythm: Normal rate and regular rhythm. Pulses: Normal pulses. Radial pulses are 2+ on the right side and 2+ on the left side. Heart sounds: Normal heart sounds. Pulmonary:      Effort: Pulmonary effort is normal. No accessory muscle usage, prolonged expiration, respiratory distress or retractions. Breath sounds: Decreased breath sounds and rales present. No wheezing or rhonchi. Abdominal:      General: There is no distension. Palpations: Abdomen is soft. Tenderness: There is no abdominal tenderness. There is no guarding or rebound. Musculoskeletal:         General: Normal range of motion. Cervical back: Normal range of motion and neck supple. Right lower le+ Pitting Edema present. Left lower le+ Pitting Edema present. Skin:     General: Skin is warm and dry. Capillary Refill: Capillary refill takes less than 2 seconds. Neurological:      General: No focal deficit present. Mental Status: He is alert and oriented to person, place, and time. Psychiatric:         Behavior: Behavior is cooperative. MDM  Number of Diagnoses or Management Options  Acute on chronic heart failure with preserved ejection fraction [facility])  Diagnosis management comments: Patient is a 49-year-old male who presents to the emergency department with complaint of shortness of breath. Patient states has been increasing over the past 2 days.   Patient presents awake, alert, following all commands, speaking full sentences vital signs are noted. Physical exam shows rales and decreased breath sounds bilaterally, edema, signs consistent with fluid overload. Work-up including labs shows no significant abnormality, trip and with delta of 2. EKG was reviewed. proBNP is elevated. Patient was treated with Bumex and decision was made to admit. Patient without severe respiratory distress on reevaluation. Patient was accepted to the house medicine service. Resident was updated. Patient plan was discussed and patient is agreeable. Patient will be monitored in the emergency department until time his bed available. Amount and/or Complexity of Data Reviewed  Clinical lab tests: ordered and reviewed  Tests in the radiology section of CPT®: ordered and reviewed       ED Course as of 08/22/22 2325   Mon Aug 22, 2022   1743 Spoke with Dr. Ke King, patient was accepted to the house medicine service. He will inform the IM residents. [QC]      ED Course User Index  [QC] Brayden Jonyer MD        EKG: This EKG is signed and interpreted by me. Rate: 73  Rhythm: Paced  Interpretation: Ventricular paced, widened QRS, normal QT interval, no acute ST or T wave changes  Comparison: stable as compared to patient's most recent EKG     ED Course as of 08/22/22 2325   Mon Aug 22, 2022   1743 Spoke with Dr. Ke King, patient was accepted to the house medicine service. He will inform the IM residents.  [QC]      ED Course User Index  [QC] Brayden Joyner MD       --------------------------------------------- PAST HISTORY ---------------------------------------------  Past Medical History:  has a past medical history of RADHA (acute kidney injury) ([facility] [address] Atrial fibrillation ([facility] [address] Carpal tunnel syndrome, Encounter regarding vascular access for dialysis for ESRD ([facility] [address] Hyperlipidemia, Hypertension, Hypothyroidism, Lung nodule, Nocturnal hypoxemia due to obesity, Obesity, NIKO (obstructive sleep apnea), Osteoarthritis, Pinched nerve, Restrictive lung disease secondary to obesity, Sinus node dysfunction (HCC), and Type II or unspecified type diabetes mellitus without mention of complication, not stated as uncontrolled. Past Surgical History:  has a past surgical history that includes Colonoscopy (2007); back surgery (2012); eye surgery; hernia repair; Colonoscopy (06/04/2012); back surgery (05/30/2017); pacemaker placement (10/03/2017); Rotator cuff repair (Right); Colonoscopy (04/26/2022); and Colonoscopy (N/A, 4/26/2022). Social History:  reports that he quit smoking about 17 years ago. His smoking use included cigarettes. He has a 3.50 pack-year smoking history. He quit smokeless tobacco use about 18 years ago. He reports that he does not drink alcohol and does not use drugs. Family History: family history includes Amyotrophic lateral sclerosis in his father; Cancer in his brother and mother; Diabetes in his brother; Heart Disease in his mother; High Blood Pressure in his brother and father. The patients home medications have been reviewed. Allergies: Patient has no known allergies.     -------------------------------------------------- RESULTS -------------------------------------------------    LABS:  Results for orders placed or performed during the hospital encounter of 08/22/22   CBC with Auto Differential   Result Value Ref Range    WBC 9.2 4.5 - 11.5 E9/L    RBC 3.27 (L) 3.80 - 5.80 E12/L    Hemoglobin 10.3 (L) 12.5 - 16.5 g/dL    Hematocrit 32.6 (L) 37.0 - 54.0 %    MCV 99.7 80.0 - 99.9 fL    MCH 31.5 26.0 - 35.0 pg    MCHC 31.6 (L) 32.0 - 34.5 %    RDW 17.4 (H) 11.5 - 15.0 fL    Platelets 583 126 - 728 E9/L    MPV 9.4 7.0 - 12.0 fL    Neutrophils % 69.0 43.0 - 80.0 %    Immature Granulocytes % 4.1 0.0 - 5.0 %    Lymphocytes % 13.8 (L) 20.0 - 42.0 %    Monocytes % 8.4 2.0 - 12.0 %    Eosinophils % 3.7 0.0 - 6.0 %    Basophils % 1.0 0.0 - 2.0 %    Neutrophils Absolute 6.33 1.80 - 7.30 E9/L    Immature Granulocytes # 0.38 E9/L Lymphocytes Absolute 1.27 (L) 1.50 - 4.00 E9/L    Monocytes Absolute 0.77 0.10 - 0.95 E9/L    Eosinophils Absolute 0.34 0.05 - 0.50 E9/L    Basophils Absolute 0.09 0.00 - 0.20 E9/L   Comprehensive Metabolic Panel w/ Reflex to MG   Result Value Ref Range    Sodium 139 132 - 146 mmol/L    Potassium reflex Magnesium 4.4 3.5 - 5.0 mmol/L    Chloride 104 98 - 107 mmol/L    CO2 24 22 - 29 mmol/L    Anion Gap 11 7 - 16 mmol/L    Glucose 158 (H) 74 - 99 mg/dL    BUN 29 (H) 6 - 23 mg/dL    Creatinine 1.3 (H) 0.7 - 1.2 mg/dL    GFR Non-African American 55 >=60 mL/min/1.73    GFR African American >60     Calcium 9.1 8.6 - 10.2 mg/dL    Total Protein 7.6 6.4 - 8.3 g/dL    Albumin 4.0 3.5 - 5.2 g/dL    Total Bilirubin 0.5 0.0 - 1.2 mg/dL    Alkaline Phosphatase 96 40 - 129 U/L    ALT 25 0 - 40 U/L    AST 16 0 - 39 U/L   Lactic Acid   Result Value Ref Range    Lactic Acid 1.5 0.5 - 2.2 mmol/L   Troponin   Result Value Ref Range    Troponin, High Sensitivity 29 (H) 0 - 11 ng/L   Brain Natriuretic Peptide   Result Value Ref Range    Pro-BNP 2,079 (H) 0 - 125 pg/mL   Urinalysis   Result Value Ref Range    Color, UA Yellow Straw/Yellow    Clarity, UA Clear Clear    Glucose, Ur 500 (A) Negative mg/dL    Bilirubin Urine Negative Negative    Ketones, Urine Negative Negative mg/dL    Specific Gravity, UA 1.010 1.005 - 1.030    Blood, Urine Negative Negative    pH, UA 5.5 5.0 - 9.0    Protein, UA Negative Negative mg/dL    Urobilinogen, Urine 0.2 <2.0 E.U./dL    Nitrite, Urine Negative Negative    Leukocyte Esterase, Urine Negative Negative   Troponin   Result Value Ref Range    Troponin, High Sensitivity 27 (H) 0 - 11 ng/L   POCT Glucose   Result Value Ref Range    Meter Glucose 163 (H) 74 - 99 mg/dL   EKG 12 Lead   Result Value Ref Range    Ventricular Rate 73 BPM    Atrial Rate 78 BPM    QRS Duration 158 ms    Q-T Interval 444 ms    QTc Calculation (Bazett) 489 ms    R Axis -76 degrees    T Axis 91 degrees       RADIOLOGY:  XR CHEST (2 VW)   Final Result   Cardiomegaly with mild interstitial pulmonary edema which is slightly more   pronounced than on the previous exam.           ------------------------- NURSING NOTES AND VITALS REVIEWED ---------------------------  Date / Time Roomed:  8/22/2022  4:51 PM  ED Bed Assignment:  [id number]    The nursing notes within the ED encounter and vital signs as below have been reviewed. Patient Vitals for the past 24 hrs:   BP Temp Temp src Pulse Resp SpO2   08/22/22 1930 130/65 -- -- 61 18 100 %   08/22/22 1700 132/76 -- -- 72 27 95 %   08/22/22 1216 132/67 -- -- -- 20 --   08/22/22 1149 -- 97.8 °F (36.6 °C) Oral 60 -- 95 %       Oxygen Saturation Interpretation: Normal    ------------------------------------------ PROGRESS NOTES ------------------------------------------  Re-evaluation(s):  Patients symptoms show no change  Repeat physical examination is not changed    Counseling:  I have spoken with the patient and discussed todays results, in addition to providing specific details for the plan of care and counseling regarding the diagnosis and prognosis. Their questions are answered at this time and they are agreeable with the plan of admission.    --------------------------------- ADDITIONAL PROVIDER NOTES ---------------------------------  Consultations:  Spoke with Dr. Ninfa Ferguson. Discussed case. They will admit the patient. This patient's ED course included: a personal history and physicial examination, multiple bedside re-evaluations, IV medications, cardiac monitoring, and continuous pulse oximetry    This patient has remained hemodynamically stable during their ED course. Diagnosis:  1. Acute on chronic heart failure with preserved ejection fraction (HCC)      Disposition:  Patient's disposition: Admit to telemetry  Patient's condition is stable. 8/22/22, 5:04 PM EDT.     This note is prepared by Ne Watson MD -PGY- 3               Ne Watson MD  Resident  08/22/22 9193

## 2022-08-23 ENCOUNTER — TELEPHONE (OUTPATIENT)
Dept: INTERNAL MEDICINE | Age: 67
End: 2022-08-23

## 2022-08-23 LAB
ANION GAP SERPL CALCULATED.3IONS-SCNC: 12 MMOL/L (ref 7–16)
BASOPHILS ABSOLUTE: 0.09 E9/L (ref 0–0.2)
BASOPHILS RELATIVE PERCENT: 1.2 % (ref 0–2)
BUN BLDV-MCNC: 28 MG/DL (ref 6–23)
CALCIUM SERPL-MCNC: 9.2 MG/DL (ref 8.6–10.2)
CHLORIDE BLD-SCNC: 105 MMOL/L (ref 98–107)
CO2: 23 MMOL/L (ref 22–29)
CREAT SERPL-MCNC: 1.2 MG/DL (ref 0.7–1.2)
EOSINOPHILS ABSOLUTE: 0.33 E9/L (ref 0.05–0.5)
EOSINOPHILS RELATIVE PERCENT: 4.3 % (ref 0–6)
GFR AFRICAN AMERICAN: >60
GFR NON-AFRICAN AMERICAN: >60 ML/MIN/1.73
GLUCOSE BLD-MCNC: 114 MG/DL (ref 74–99)
HCT VFR BLD CALC: 33.4 % (ref 37–54)
HEMOGLOBIN: 10.8 G/DL (ref 12.5–16.5)
IMMATURE GRANULOCYTES #: 0.33 E9/L
IMMATURE GRANULOCYTES %: 4.3 % (ref 0–5)
LV EF: 65 %
LVEF MODALITY: NORMAL
LYMPHOCYTES ABSOLUTE: 1.22 E9/L (ref 1.5–4)
LYMPHOCYTES RELATIVE PERCENT: 15.9 % (ref 20–42)
MCH RBC QN AUTO: 31.7 PG (ref 26–35)
MCHC RBC AUTO-ENTMCNC: 32.3 % (ref 32–34.5)
MCV RBC AUTO: 97.9 FL (ref 80–99.9)
METER GLUCOSE: 126 MG/DL (ref 74–99)
METER GLUCOSE: 152 MG/DL (ref 74–99)
METER GLUCOSE: 196 MG/DL (ref 74–99)
MONOCYTES ABSOLUTE: 0.64 E9/L (ref 0.1–0.95)
MONOCYTES RELATIVE PERCENT: 8.3 % (ref 2–12)
NEUTROPHILS ABSOLUTE: 5.06 E9/L (ref 1.8–7.3)
NEUTROPHILS RELATIVE PERCENT: 66 % (ref 43–80)
PDW BLD-RTO: 17.1 FL (ref 11.5–15)
PLATELET # BLD: 190 E9/L (ref 130–450)
PMV BLD AUTO: 9.4 FL (ref 7–12)
POTASSIUM REFLEX MAGNESIUM: 4.3 MMOL/L (ref 3.5–5)
RBC # BLD: 3.41 E12/L (ref 3.8–5.8)
SODIUM BLD-SCNC: 140 MMOL/L (ref 132–146)
WBC # BLD: 7.7 E9/L (ref 4.5–11.5)

## 2022-08-23 PROCEDURE — 6370000000 HC RX 637 (ALT 250 FOR IP): Performed by: STUDENT IN AN ORGANIZED HEALTH CARE EDUCATION/TRAINING PROGRAM

## 2022-08-23 PROCEDURE — 82962 GLUCOSE BLOOD TEST: CPT

## 2022-08-23 PROCEDURE — APPSS60 APP SPLIT SHARED TIME 46-60 MINUTES: Performed by: PHYSICIAN ASSISTANT

## 2022-08-23 PROCEDURE — 2060000000 HC ICU INTERMEDIATE R&B

## 2022-08-23 PROCEDURE — 6360000002 HC RX W HCPCS: Performed by: STUDENT IN AN ORGANIZED HEALTH CARE EDUCATION/TRAINING PROGRAM

## 2022-08-23 PROCEDURE — 93306 TTE W/DOPPLER COMPLETE: CPT

## 2022-08-23 PROCEDURE — 2580000003 HC RX 258: Performed by: STUDENT IN AN ORGANIZED HEALTH CARE EDUCATION/TRAINING PROGRAM

## 2022-08-23 PROCEDURE — 80048 BASIC METABOLIC PNL TOTAL CA: CPT

## 2022-08-23 PROCEDURE — 94640 AIRWAY INHALATION TREATMENT: CPT

## 2022-08-23 PROCEDURE — 85025 COMPLETE CBC W/AUTO DIFF WBC: CPT

## 2022-08-23 PROCEDURE — 36415 COLL VENOUS BLD VENIPUNCTURE: CPT

## 2022-08-23 PROCEDURE — 2700000000 HC OXYGEN THERAPY PER DAY

## 2022-08-23 PROCEDURE — 99223 1ST HOSP IP/OBS HIGH 75: CPT | Performed by: INTERNAL MEDICINE

## 2022-08-23 PROCEDURE — 99222 1ST HOSP IP/OBS MODERATE 55: CPT | Performed by: INTERNAL MEDICINE

## 2022-08-23 PROCEDURE — S5553 INSULIN LONG ACTING 5 U: HCPCS | Performed by: STUDENT IN AN ORGANIZED HEALTH CARE EDUCATION/TRAINING PROGRAM

## 2022-08-23 PROCEDURE — 94664 DEMO&/EVAL PT USE INHALER: CPT

## 2022-08-23 RX ORDER — METOPROLOL SUCCINATE 25 MG/1
12.5 TABLET, EXTENDED RELEASE ORAL DAILY
Status: DISCONTINUED | OUTPATIENT
Start: 2022-08-23 | End: 2022-08-24 | Stop reason: HOSPADM

## 2022-08-23 RX ADMIN — SODIUM CHLORIDE, PRESERVATIVE FREE 10 ML: 5 INJECTION INTRAVENOUS at 09:00

## 2022-08-23 RX ADMIN — ATORVASTATIN CALCIUM 40 MG: 40 TABLET, FILM COATED ORAL at 09:32

## 2022-08-23 RX ADMIN — HYDROCODONE BITARTRATE AND ACETAMINOPHEN 1 TABLET: 5; 325 TABLET ORAL at 00:14

## 2022-08-23 RX ADMIN — INSULIN LISPRO 10 UNITS: 100 INJECTION, SOLUTION INTRAVENOUS; SUBCUTANEOUS at 09:32

## 2022-08-23 RX ADMIN — APIXABAN 5 MG: 5 TABLET, FILM COATED ORAL at 20:33

## 2022-08-23 RX ADMIN — INSULIN LISPRO 10 UNITS: 100 INJECTION, SOLUTION INTRAVENOUS; SUBCUTANEOUS at 17:02

## 2022-08-23 RX ADMIN — LEVOTHYROXINE SODIUM 88 MCG: 0.09 TABLET ORAL at 05:23

## 2022-08-23 RX ADMIN — GABAPENTIN 300 MG: 300 CAPSULE ORAL at 09:32

## 2022-08-23 RX ADMIN — ARFORMOTEROL TARTRATE 15 MCG: 15 SOLUTION RESPIRATORY (INHALATION) at 09:59

## 2022-08-23 RX ADMIN — SODIUM CHLORIDE, PRESERVATIVE FREE 10 ML: 5 INJECTION INTRAVENOUS at 20:47

## 2022-08-23 RX ADMIN — GABAPENTIN 300 MG: 300 CAPSULE ORAL at 20:33

## 2022-08-23 RX ADMIN — APIXABAN 5 MG: 5 TABLET, FILM COATED ORAL at 09:32

## 2022-08-23 RX ADMIN — INSULIN GLARGINE-YFGN 40 UNITS: 100 INJECTION, SOLUTION SUBCUTANEOUS at 20:41

## 2022-08-23 RX ADMIN — INSULIN LISPRO 10 UNITS: 100 INJECTION, SOLUTION INTRAVENOUS; SUBCUTANEOUS at 12:05

## 2022-08-23 RX ADMIN — METFORMIN HYDROCHLORIDE 500 MG: 1000 TABLET, FILM COATED ORAL at 09:32

## 2022-08-23 RX ADMIN — BUMETANIDE 2 MG: 1 TABLET ORAL at 09:32

## 2022-08-23 RX ADMIN — BUDESONIDE 500 MCG: 0.5 SUSPENSION RESPIRATORY (INHALATION) at 09:59

## 2022-08-23 ASSESSMENT — PAIN SCALES - GENERAL
PAINLEVEL_OUTOF10: 0
PAINLEVEL_OUTOF10: 7
PAINLEVEL_OUTOF10: 0
PAINLEVEL_OUTOF10: 7
PAINLEVEL_OUTOF10: 0
PAINLEVEL_OUTOF10: 0

## 2022-08-23 ASSESSMENT — PAIN DESCRIPTION - LOCATION
LOCATION: BACK
LOCATION: BACK

## 2022-08-23 NOTE — PLAN OF CARE
Problem: Discharge Planning  Goal: Discharge to home or other facility with appropriate resources  Outcome: Progressing  Flowsheets (Taken 8/23/2022 0021)  Discharge to home or other facility with appropriate resources:   Identify barriers to discharge with patient and caregiver   Identify discharge learning needs (meds, wound care, etc)     Problem: Pain  Goal: Verbalizes/displays adequate comfort level or baseline comfort level  Outcome: Progressing     Problem: ABCDS Injury Assessment  Goal: Absence of physical injury  Outcome: Progressing     Problem: Safety - Adult  Goal: Free from fall injury  Outcome: Progressing

## 2022-08-23 NOTE — TELEPHONE ENCOUNTER
Consult from Dr. Coon Read re: financial concerns as pt stated he did not attend CHF appt due to copay; Pt was admitted to hospital after 8.22.22 IM appt and remains hospitalized.   Reachout to CHF clinic re: copay and message sent

## 2022-08-23 NOTE — PROGRESS NOTES
[facility]  Internal Medicine Department    Attending Physician Statement:  Angy Kohli D.O. I have discussed the case, including pertinent history and exam findings with the resident. I agree with the assessment, plan and orders as documented by the resident. Patient here for routine follow up of medical problems. Acute Exacerbation of HFpEF: EF 2021: 60%; indeterminant DD; patient has had multiple changes in HF medications in last month;   -recent restarted on aldactone and metolazone at most recent EP visit; these were Dcd at last hospitalization visit; ?med rec problem as they were marked as \"taking\" on most recent EP visit  -patient did not tolerate entresto; and was discharged on Bumex 2 mg and Toprol XL  - patient gained 20 lbs since last hospitalization and has not followed with HF clinic  -plan to consult cardiology for optimization of GDMT since patient has not seen them in 1 year     Persistant Afib: restarting Toprol at lower dose to prevent RVR; continue eliqius; patient has pacemaker implanted     IDDM2: titrate insulin as needed     Remainder of medical problems as per resident note.

## 2022-08-23 NOTE — CONSULTS
Inpatient [facility] Cardiology Consultation      Reason for Consult: CHF    Consulting Physician: Dr. Jerel Andrade    Requesting Physician: Dr. Aldean Ahumada    Date of Consultation: 8/23/2022    HISTORY OF PRESENT ILLNESS:   Patient is a 69year old WM not previously known to [facility] Cardiology. He is being seen in consultation this hospital admission by Dr. Jerel Andrade for evaluation and recommendations regarding CHF. PMH: obesity, HTN, HLD, insulin requiring T2DM with peripheral neuropathy, CKD, chronic anemia, pulmonary nodules, NIKO compliant with treatment, restrictive lung disease with qhs supplemental O2 use (PRN during the day), former tobacco smoker, hypothyroidism on HRT, questionable chronic HFpEF, NSVT, SND s/p dual-chamber Medtronic PPM placement 10/2017, persistent AF on chronic Eliquis therapy. Denies prior history of CAD, MI, VHD. Denies prior [facility]    Patient presented to [facility] on August 22, 2022 with complaints of shortness of breath and peripheral edema. Of note, patient is a poor historian at times regarding his past medical history. As a result, some of history obtained through chart review  Per patient, he is unsure why or how long he has been on oral diuretic therapy. He admits to good urinary response with PO Bumex. However, over the last couple days, he has noted of worsening BALL compared to his typical baseline, with eventual dyspnea at rest.  He notes of peripheral edema bilaterally, which he states is at his baseline. He has had to start wearing his supplemental NC O2 during the day (typically only wears 4L qhs), and has had to wear 5L around the clock the past few days  Denies chest pain, N/V, diaphoresis, dizziness, palpitations, near syncope or syncope.   Denies PND, orthopnea  Notes of dietary indiscretions, as well as not weighing himself daily  Admits to medication compliance      WEIGHT TREND:  Wt Readings from Last 3 Encounters:   08/09/22 239 lb (108.4 kg)   07/18/22 225 lb 14.4 oz (102.5 kg)   07/13/22 225 lb 4.8 oz (102.2 kg)     Please note: past medical records were reviewed per electronic medical record (EMR) - see detailed reports under Past Medical/ Surgical History. PAST MEDICAL HISTORY:    Karena Poot Stress Test 2013: Normal MPI. No ischemia or infarct. No WMA. EF 69%  Obesity  HTN  HLD, on statin therapy   Insulin requiring T2DM with peripheral neuropathy  Chronic anemia  CKD with hypertensive nephrosclerosis  Ischemic ATN for which he required HD treatment for 4 days with adequate renal function recovery in May 2017  Follow with Dr. Bin Stanley appears 1.0 to 1.3  Pulmonary nodules   NIKO, compliant with CPAP  Restrictive lung disease, follows with Pulmonology (Dr. Hunter Mejia), uses supplemental 4L O2 qhs (PRN during the day)  Former tobacco smoker  Hypothyroidism, on HRT  ? Chronic HFpEF  TTE (Dr. Joanne Pineda, 9/2021): LVEF 55-60%. No WMA. Indeterminate diastolic function. Normal RV size and function (not well visualized).  No significant VHD  NSVT  SND s/p PPM placement (dual chamber Medtronic PPM, MRI conditional) 10/2017  Persistent AF, on chronic Eliquis therapy  Patient opts for rate control strategy, follows with [facility]  Interrogation at time of EP OV 8/15/2022 revealed AF burden 93%, asymptomatic    PAST SURGICAL HISTORY:    Past Surgical History:   Procedure Laterality Date    BACK SURGERY  2012    [facility]. Pinched nerve in back    BACK SURGERY  05/30/2017    COLONOSCOPY  2007    multiple colon polyps    COLONOSCOPY  06/04/2012    COLONOSCOPY    COLONOSCOPY  04/26/2022    polyp; diverticula--sarah    COLONOSCOPY N/A 4/26/2022    COLONOSCOPY POLYPECTOMY HOT BIOPSY (Snare) performed by Abbie Beavers MD at [address]      lennie cataracts implant    HERNIA REPAIR      umbilical    PACEMAKER PLACEMENT  10/03/2017    Medtronic dual chamber    Dr. Estrellita Hernandez:  Prior to Admission medications    Medication Sig Start Date End Date Taking? Authorizing Provider   acetaminophen-codeine (TYLENOL #3) 300-30 MG per tablet TAKE ONE TABLET BY MOUTH EVERY 6 HOURS AS NEEDED FOR PAIN 7/18/22   Historical Provider, MD   gabapentin (NEURONTIN) 300 MG capsule TAKE ONE CAPSULE BY MOUTH TWO TIMES A DAY 8/12/22   Historical Provider, MD   metOLazone (ZAROXOLYN) 2.5 MG tablet take one tablet by mouth 3 three times a week monday wednesday and friday 7/4/22   Historical Provider, MD   spironolactone (ALDACTONE) 25 MG tablet TAKE ONE TABLET (25 MG TOTAL) BY MOUTH 3 TIMES WEEKLY  MONDAY WEDNESDAY AND FRIDAY EVENING 8/12/22   Historical Provider, MD   ELIQUIS 5 MG TABS tablet Take 1 tablet by mouth in the morning and 1 tablet before bedtime. 7/18/22   Rikki Cleary MD   atorvastatin (LIPITOR) 40 MG tablet TAKE ONE TABLET BY MOUTH DAILY 7/18/22   Rikki Cleary MD   metoprolol succinate (TOPROL XL) 25 MG extended release tablet Take 1 tablet by mouth in the morning. 7/18/22   Rikki Cleary MD   levothyroxine (SYNTHROID) 88 MCG tablet Take 1 tablet by mouth in the morning. 7/18/22 1/14/23  Rikki Cleary MD   mometasone-formoterol [facility]) 200-5 MCG/ACT inhaler Inhale 2 puffs into the lungs in the morning and 2 puffs before bedtime. Rinse mouth after using. PAP medication. . 7/18/22   Rikki Cleary MD   insulin lispro, 1 Unit Dial, (HUMALOG KWIKPEN) 100 UNIT/ML SOPN Inject 20 Units into the skin in the morning and 20 Units at noon and 20 Units in the evening. Inject before meals. Do all this for 225 doses. 7/18/22 10/1/22  Rikki Cleary MD   bumetanide (BUMEX) 2 MG tablet Take 1 tablet by mouth daily 7/14/22   Venessa Castorena MD   insulin glargine (LANTUS SOLOSTAR) 100 UNIT/ML injection pen Inject 55 Units into the skin nightly 7/13/22   Roselia Reno MD   Insulin Pen Needle (PEN NEEDLES) 31G X 6 MM MISC Once daily. May substitute brand for insurance coverage.  7/13/22   Roselia Reno MD   empagliflozin (JARDIANCE) 25 MG tablet Take 1 tablet by mouth daily 6/23/22 6/23/23  Edwige Gifford DO   metFORMIN (GLUCOPHAGE) 500 MG tablet Take 1 tablet by mouth 2 times daily (with meals) 5/26/22   Jazlyn Jefferson MD   Potassium 99 MG TABS Take 1 tablet by mouth daily    Historical Provider, MD   Ferrous Gluconate-C-Folic Acid (IRON-C PO) Take by mouth    Historical Provider, MD   aspirin (ASPIR-LOW) 81 MG EC tablet TAKE ONE TABLET BY MOUTH EVERY DAY 2/28/22   Diana Connor MD   Insulin Pen Needle (PEN NEEDLES) 32G X 6 MM MISC Take insulin daily 2/28/22   Diana Connor MD   Lancets MISC Give Delica lancets. Tests twice a day A.M. And P.M 2/28/22   Diana Connor MD   blood glucose test strips (ONETOUCH VERIO) strip TEST IN THE MORNING AND IN THE EVENING 2/28/22   Diana Connor MD   Misc. Devices MISC Please add portability to current O2 order. Resp to evaluate patient for OCD device. 9/15/21   Chris Lopez MD   Misc.  Devices KIT Dispense 1 blood pressure cuff. 2/23/21   Olya Mendez MD   OXYGEN Inhale 2.5 L into the lungs nightly  Patient taking differently: Inhale 2.5 L into the lungs nightly Spouse states uses  5 L/min via nc bedtime 7/28/20   Ki Pinto MD   zoster recombinant adjuvanted vaccine [facility]) 50 MCG/0.5ML SUSR injection 1 dose now and repeat in 2-6 months  Patient not taking: Reported on 8/22/2022 7/19/19   Ki Pinto MD   acetaminophen (TYLENOL) 500 MG tablet Take 1,000 mg by mouth daily as needed for Pain    Historical Provider, MD       CURRENT MEDICATIONS:      Current Facility-Administered Medications:     atorvastatin (LIPITOR) tablet 40 mg, 40 mg, Oral, Daily, Rema Abad MD, 40 mg at 08/23/22 0932    bumetanide (BUMEX) tablet 2 mg, 2 mg, Oral, Daily, Rema Abad MD, 2 mg at 08/23/22 0932    apixaban (ELIQUIS) tablet 5 mg, 5 mg, Oral, BID, Rema Abad MD, 5 mg at 08/23/22 0932    gabapentin (NEURONTIN) capsule 300 mg, 300 mg, Oral, BID, Rema Abad MD, 300 mg at 08/23/22 0932    insulin glargine-[facility] (SEMGLEE-YFGN) injection vial 40 Units, 40 Units, SubCUTAneous, Nightly, Hannah Acosta MD, 40 Units at 08/22/22 2309    insulin lispro (HUMALOG) injection vial 10 Units, 10 Units, SubCUTAneous, TID AC, Hannah Acosta MD, 10 Units at 08/23/22 0932    insulin lispro (HUMALOG) injection vial 0-4 Units, 0-4 Units, SubCUTAneous, TID WC, Hannah Acosta MD    insulin lispro (HUMALOG) injection vial 0-4 Units, 0-4 Units, SubCUTAneous, Nightly, Hannah Acosta MD    levothyroxine (SYNTHROID) tablet 88 mcg, 88 mcg, Oral, Daily, Hannah Acosta MD, 88 mcg at 08/23/22 8667    sodium chloride flush 0.9 % injection 5-40 mL, 5-40 mL, IntraVENous, 2 times per day, Hannah Acosta MD, 5 mL at 08/22/22 2200    sodium chloride flush 0.9 % injection 5-40 mL, 5-40 mL, IntraVENous, PRN, Hannah Acosta MD    0.9 % sodium chloride infusion, , IntraVENous, PRN, Hannah Acosta MD    ondansetron (ZOFRAN-ODT) disintegrating tablet 4 mg, 4 mg, Oral, Q8H PRN **OR** ondansetron (ZOFRAN) injection 4 mg, 4 mg, IntraVENous, Q6H PRN, Hannah Acosta MD    polyethylene glycol (GLYCOLAX) packet 17 g, 17 g, Oral, Daily PRN, Hannah Acosta MD    acetaminophen (TYLENOL) tablet 650 mg, 650 mg, Oral, Q6H PRN **OR** acetaminophen (TYLENOL) suppository 650 mg, 650 mg, Rectal, Q6H PRN, Hannah Acosta MD    perflutren lipid microspheres (DEFINITY) injection 1.65 mg, 1.5 mL, IntraVENous, ONCE PRN, Hannah Acosta MD    glucose chewable tablet 16 g, 4 tablet, Oral, PRN, Hannah Acosta MD    dextrose bolus 10% 125 mL, 125 mL, IntraVENous, PRN **OR** dextrose bolus 10% 250 mL, 250 mL, IntraVENous, PRN, Hannah Acosta MD    glucagon (rDNA) injection 1 mg, 1 mg, SubCUTAneous, PRN, Hannah Acosta MD    dextrose 10 % infusion, , IntraVENous, Continuous PRN, Hannah Acosta MD    budesonide (PULMICORT) nebulizer suspension 500 mcg, 0.5 mg, Nebulization, BID, 500 mcg at 08/23/22 0959 **AND** Arformoterol Tartrate (BROVANA) nebulizer solution 15 mcg, 15 mcg, Nebulization, BID, Hannah Acosta MD, 15 mcg at 08/23/22 0959    HYDROcodone-acetaminophen (NORCO) 5-325 MG per tablet 1 tablet, 1 tablet, Oral, Q8H PRN, Kindra Awan MD, 1 tablet at 08/23/22 0014      ALLERGIES:  Patient has no known allergies. SOCIAL HISTORY:    Lives in a two story home with his wife. Uses cane as needed for ambulation. Former tobacco smoker, quit approximately 15 years ago. Smoked < 1 ppd for 10 + years. Former chewing tobacco use. Former ETOH abuse. Denies former/current illicit drug use      FAMILY HISTORY:   Non-contributory at this time due to patient's advanced age      REVIEW OF SYSTEMS:     Negative except as noted above in HPI      PHYSICAL EXAM:   BP (!) 102/45   Pulse 61   Temp 98.4 °F (36.9 °C) (Tympanic)   Resp 16   Ht 5' 7\" (1.702 m)   Wt 246 lb (111.6 kg)   SpO2 100%   BMI 38.53 kg/m²   CONST:  Well developed, obese WM who appears stated age. Awake, alert, cooperative, no apparent distress. HEENT:   Head- Normocephalic, atraumatic. Eyes- Conjunctivae pink, anicteric. Neck-  No stridor, trachea midline, +jugular venous distention. CHEST: Chest symmetrical and non-tender to palpation. No accessory muscle use or intercostal retractions. RESPIRATORY: Lung sounds - diminished with rales bilaterally  CARDIOVASCULAR:     No noted carotid bruit. Heart Ausculation- Irregular rhythm with normal rate, questionable soft systolic murmur  PV: 5-2+ bilateral lower extremity edema. +varicosities. No clubbing or cyanosis. ABDOMEN: Soft, non-tender to light palpation. Bowel sounds present. MS: Good muscle strength and tone. No atrophy or abnormal movements. SKIN: Warm and dry. NEURO / PSYCH: Oriented to person, place and time. Speech clear and appropriate. Follows all commands. Pleasant affect. DATA:    Telemetry: Paced with HR in the 60s    Diagnostic:  All diagnostic testing and lab work thus far this admission reviewed in detail.     CXR 8/22/2022  Impression  Cardiomegaly with mild interstitial pulmonary edema which is slightly more  pronounced than on the previous exam.    Intake/Output Summary (Last 24 hours) at 8/23/2022 1125  Last data filed at 8/23/2022 0622  Gross per 24 hour   Intake 180 ml   Output 2850 ml   Net -2670 ml       Labs:   CBC:   Recent Labs     08/22/22  1238 08/23/22  0507   WBC 9.2 7.7   HGB 10.3* 10.8*   HCT 32.6* 33.4*    190     BMP:   Recent Labs     08/22/22  1238 08/23/22  0507    140   K 4.4 4.3   CO2 24 23   BUN 29* 28*   CREATININE 1.3* 1.2   LABGLOM 55 >60   CALCIUM 9.1 9.2     HgA1c:   Lab Results   Component Value Date    LABA1C 11.0 06/23/2022     FASTING LIPID PANEL:  Lab Results   Component Value Date/Time    CHOL 120 06/14/2021 11:40 AM    HDL 24 06/14/2021 11:40 AM    LDLCALC 63 06/14/2021 11:40 AM    TRIG 165 06/14/2021 11:40 AM     LIVER PROFILE:  Recent Labs     08/22/22  1238   AST 16   ALT 25   LABALBU 4.0      Ref Range & Units 08/22/22 1238   Troponin, High Sensitivity 0 - 11 ng/L 29 High       Component Ref Range & Units 08/22/22 1743   Troponin, High Sensitivity 0 - 11 ng/L 27 High          Component Ref Range & Units 08/22/22 1238   Pro-BNP 0 - 125 pg/mL 2,079 High          Ref Range & Units 08/22/22 1238   Lactic Acid 0.5 - 2.2 mmol/L 1.5      Component Ref Range & Units 08/22/22 1238   Color, UA Straw/Yellow Yellow    Clarity, UA Clear Clear    Glucose, Ur Negative mg/dL 500 Abnormal     Bilirubin Urine Negative Negative    Ketones, Urine Negative mg/dL Negative    Specific Gravity, UA 1.005 - 1.030 1.010    Blood, Urine Negative Negative    pH, UA 5.0 - 9.0 5.5    Protein, UA Negative mg/dL Negative    Urobilinogen, Urine <2.0 E.U./dL 0.2    Nitrite, Urine Negative Negative    Leukocyte Esterase, Urine Negative Negative           ASSESSMENT/RECOMMENDATIONS:  Acute on chronic HFpEF  Last EF 55-60% on TTE 9/2021  Appears hypervolemic on exam, proBNP 2,000  Weight 8/9/2022 noted to be 239 lbs --> now 246 lbs today  Persistent AF, on chronic Eliquis therapy  Patient opts for rate control strategy, follows with [facility]  PPM interrogation at time of EP OV 8/15/2022 revealed AF burden 93%, asymptomatic  Paced on telemetry   SND s/p dual-chamber Medtronic PPM in 2017. Follows with [facility]  History of NSVT  HTN, controlled  HLD, on statin therapy  Insulin requiring T2DM with peripheral neuropathy  CKD. Stable  Chronic anemia   Obesity   Former tobacco smoker  Restrictive lung disease, wears 4L NC qhs. Follows with Dr. Randall Nearing  NIKO, compliant with CPAP  History of pulmonary nodules  Hypothyroidism, on HRT      Agree with TTE   Will discuss further IV diuresis with Dr. Eli Mcintosh - patient is currently on PO  Strict intake and output, daily weights  Monitor renal function and electrolytes  Recent PPM interrogation reviewed   Eventual ischemic evaluation   Rest as per primary team and other consultants          The above case and recommendations to be discussed with Dr. Eli Mcintosh. Further recommendations to follow as per him      NOTE: This report was transcribed using voice recognition software. Every effort was made to ensure accuracy; however, inadvertent computerized transcription errors may be present.       [facility], [address] [place] [address] Cardiology    Electronically signed by Steve Chen PA-C on 8/23/2022 at 11:25 AM                                                                                                             Inpatient CARDIOLOGY Consultation        Reason for Consult:  CHF    Date of Consultation: 8/23/2022    Patient previously known to Lyly Parsons       HISTORY OF PRESENT ILLNESS: 69year old with history of Pacer 2017, A fib, NSVT, obesity, HTN, HLD, insulin requiring T2DM with peripheral neuropathy, CKD, chronic anemia, pulmonary nodules, NIKO compliant with treatment, restrictive lung disease with qhs supplemental O2 use (PRN during the day), former tobacco smoker, hypothyroidism on HRT, questionable chronic HFpEF presented to [facility] on August 22, 2022 with complaints of shortness of breath and peripheral edema. Of note, patient is a poor historian at times regarding his past medical history. As a result, some of history obtained through chart review. Per patient, he is unsure why or how long he has been on oral diuretic therapy. He admits to good urinary response with PO Bumex. However, over the last couple days, he has noted of worsening BALL compared to his typical baseline, with eventual dyspnea at rest.  He notes of peripheral edema bilaterally, which he states is at his baseline. He has had to start wearing his supplemental NC O2 during the day (typically only wears 4L qhs), and has had to wear 5L around the clock the past few days    Denies chest pain, N/V, diaphoresis, dizziness, palpitations, near syncope or syncope. Denies PND, orthopnea  Notes of dietary indiscretions, as well as not weighing himself daily. Admits to medication compliance      REVIEW OF SYSTEMS:   Review of rest of 12 systems negative except as mentioned in HPI. Please note: past medical records were reviewed per electronic medical record (EMR) - see detailed reports under Past Medical/ Surgical History.        Past Medical History:    Past Medical History:   Diagnosis Date    RADHA (acute kidney injury) ([facility] [address] 3/10/2022    Atrial fibrillation (HCC)     Carpal tunnel syndrome     Encounter regarding vascular access for dialysis for ESRD ([facility] [address] 3/12/2022    Hyperlipidemia     Hypertension     Hypothyroidism     Lung nodule     Nocturnal hypoxemia due to obesity 8/8/2013    Obesity     NIKO (obstructive sleep apnea) 8/8/2013    [facility]    Osteoarthritis     Pinched nerve     Lumbar    Restrictive lung disease secondary to obesity 7/25/2016    Sinus node dysfunction (HCC)     Type II or unspecified type diabetes mellitus without mention of complication, not stated as uncontrolled        Past Surgical History:    Past Surgical History: Procedure Laterality Date    BACK SURGERY      [facility]. Pinched nerve in back    BACK SURGERY  2017    COLONOSCOPY  2007    multiple colon polyps    COLONOSCOPY  2012    COLONOSCOPY    COLONOSCOPY  2022    polyp; diverticula--sarah    COLONOSCOPY N/A 2022    COLONOSCOPY POLYPECTOMY HOT BIOPSY (Snare) performed by Panchito Delcid MD at [address]      lennie cataracts implant    HERNIA REPAIR      umbilical    PACEMAKER PLACEMENT  10/03/2017    Medtronic dual chamber    Dr. Joni Yeager Right          Allergies:    Patient has no known allergies. Social History:    Social History     Socioeconomic History    Marital status:      Spouse name: Not on file    Number of children: Not on file    Years of education: Not on file    Highest education level: Not on file   Occupational History    Not on file   Tobacco Use    Smoking status: Former     Packs/day: 0.10     Years: 35.00     Pack years: 3.50     Types: Cigarettes     Quit date: 2004     Years since quittin.7    Smokeless tobacco: Former     Quit date:    Vaping Use    Vaping Use: Never used   Substance and Sexual Activity    Alcohol use: No     Alcohol/week: 0.0 standard drinks    Drug use: No    Sexual activity: Yes     Partners: Female   Other Topics Concern    Not on file   Social History Narrative    Not on file     Social Determinants of Health     Financial Resource Strain: High Risk    Difficulty of Paying Living Expenses: Very hard   Food Insecurity: No Food Insecurity    Worried About Running Out of Food in the Last Year: Never true    Ran Out of Food in the Last Year: Never true   Transportation Needs: No Transportation Needs    Lack of Transportation (Medical): No    Lack of Transportation (Non-Medical):  No   Physical Activity: Inactive    Days of Exercise per Week: 0 days    Minutes of Exercise per Session: 0 min   Stress: Not on file   Social Connections: Not on file   Intimate Partner Violence: Not on file   Housing Stability: Low Risk     Unable to Pay for Housing in the Last Year: No    Number of Places Lived in the Last Year: 1    Unstable Housing in the Last Year: No       Family History:   Family History   Problem Relation Age of Onset    Cancer Mother         skin    Heart Disease Mother     High Blood Pressure Father     Amyotrophic lateral sclerosis Father     Cancer Brother     High Blood Pressure Brother     Diabetes Brother          Medications Prior to admit: Reviewed  Prior to Admission medications    Medication Sig Start Date End Date Taking? Authorizing Provider   acetaminophen-codeine (TYLENOL #3) 300-30 MG per tablet TAKE ONE TABLET BY MOUTH EVERY 6 HOURS AS NEEDED FOR PAIN 7/18/22   Historical Provider, MD   gabapentin (NEURONTIN) 300 MG capsule TAKE ONE CAPSULE BY MOUTH TWO TIMES A DAY 8/12/22   Historical Provider, MD   metOLazone (ZAROXOLYN) 2.5 MG tablet take one tablet by mouth 3 three times a week monday wednesday and friday 7/4/22   Historical Provider, MD   spironolactone (ALDACTONE) 25 MG tablet TAKE ONE TABLET (25 MG TOTAL) BY MOUTH 3 TIMES WEEKLY  MONDAY WEDNESDAY AND FRIDAY EVENING 8/12/22   Historical Provider, MD   ELIQUIS 5 MG TABS tablet Take 1 tablet by mouth in the morning and 1 tablet before bedtime. 7/18/22   Christopher Cordova MD   atorvastatin (LIPITOR) 40 MG tablet TAKE ONE TABLET BY MOUTH DAILY 7/18/22   Christopher Cordova MD   metoprolol succinate (TOPROL XL) 25 MG extended release tablet Take 1 tablet by mouth in the morning. 7/18/22   Christopher Cordova MD   levothyroxine (SYNTHROID) 88 MCG tablet Take 1 tablet by mouth in the morning. 7/18/22 1/14/23  Christopher Cordova MD   mometasone-formoterol [facility]) 200-5 MCG/ACT inhaler Inhale 2 puffs into the lungs in the morning and 2 puffs before bedtime. Rinse mouth after using. PAP medication. . 7/18/22   Christopher Cordova MD   insulin lispro, 1 Unit Dial, (HUMPersado [facility]) 100 UNIT/ML SOPN Inject 20 Units into the skin in the morning and 20 Units at noon and 20 Units in the evening. Inject before meals. Do all this for 225 doses. 7/18/22 10/1/22  Ludin Ulloa MD   bumetanide (BUMEX) 2 MG tablet Take 1 tablet by mouth daily 7/14/22   Clau Catalan MD   insulin glargine (LANTUS SOLOSTAR) 100 UNIT/ML injection pen Inject 55 Units into the skin nightly 7/13/22   Sascha Fung MD   Insulin Pen Needle (PEN NEEDLES) 31G X 6 MM MISC Once daily. May substitute brand for insurance coverage. 7/13/22   Sascha Fung MD   empagliflozin (JARDIANCE) 25 MG tablet Take 1 tablet by mouth daily 6/23/22 6/23/23  Lucy Schaeffer DO   metFORMIN (GLUCOPHAGE) 500 MG tablet Take 1 tablet by mouth 2 times daily (with meals) 5/26/22   Alexia Santos MD   Potassium 99 MG TABS Take 1 tablet by mouth daily    Historical Provider, MD   Ferrous Gluconate-C-Folic Acid (IRON-C PO) Take by mouth    Historical Provider, MD   aspirin (ASPIR-LOW) 81 MG EC tablet TAKE ONE TABLET BY MOUTH EVERY DAY 2/28/22   Ismael Torres MD   Insulin Pen Needle (PEN NEEDLES) 32G X 6 MM MISC Take insulin daily 2/28/22   Ismael Torres MD   Lancets MISC Give Delica lancets. Tests twice a day A.M. And P.M 2/28/22   Ismael Torres MD   blood glucose test strips (ONETOUCH VERIO) strip TEST IN THE MORNING AND IN THE EVENING 2/28/22   Ismael Torres MD   Misc. Devices MISC Please add portability to current O2 order. Resp to evaluate patient for OCD device. 9/15/21   Sonia Sandy MD   Misc.  Devices KIT Dispense 1 blood pressure cuff. 2/23/21   Radha Elizabeth MD   OXYGEN Inhale 2.5 L into the lungs nightly  Patient taking differently: Inhale 2.5 L into the lungs nightly Spouse states uses  5 L/min via nc bedtime 7/28/20   Slick Abdi MD   zoster recombinant adjuvanted vaccine [facility]) 50 MCG/0.5ML SUSR injection 1 dose now and repeat in 2-6 months  Patient not taking: Reported on 8/22/2022 7/19/19   Slick Abdi MD   acetaminophen insulin lispro  10 Units SubCUTAneous TID AC    insulin lispro  0-4 Units SubCUTAneous TID WC    insulin lispro  0-4 Units SubCUTAneous Nightly    levothyroxine  88 mcg Oral Daily    sodium chloride flush  5-40 mL IntraVENous 2 times per day    budesonide  0.5 mg Nebulization BID    And    Arformoterol Tartrate  15 mcg Nebulization BID       IV Infusions (if any):   sodium chloride      dextrose         PHYSICAL EXAM:     BP 98/67   Pulse 60   Temp 98 °F (36.7 °C) (Temporal)   Resp 16   Ht 5' 7\" (1.702 m)   Wt 246 lb (111.6 kg)   SpO2 95%   BMI 38.53 kg/m²     CONST:  Well developed, appears stated age. Awake, alert and no apparent distress. HEENT:   Head- Normocephalic  Eyes- Conjunctivae pink, no icterus  Throat- Oral mucosa moist  Neck-  No stridor, no jugular venous distention. No carotid bruit. CHEST: Chest symmetrical and non-tender to palpation. No accessory muscle use  RESPIRATORY: Lung sounds - Few rhonchi  CARDIOVASCULAR:     Heart Inspection-pacer site [facility]  Heart Palpation- No thrills. Heart Ausculation- Regular rate and rhythm, 1/6 systolic murmur. No s3 or rub   EXT:+ lower extremity edema. Distal pulses palpable, no cyanosis   ABDOMEN: Soft, non-tender to light palpation. Obese, Bowel sounds present. No abdominal bruit  MS: n/a  : Deferred  RECTAL: Deferred  SKIN: Warm and dry   NEURO / PSYCH: Oriented to person, place; Good mood and affect. IMPRESSION / RECOMMENDATIONS:    Acute on chronic HFpEF - Continue Diuretics, Monitor Wts, I/Os, BP, renal  Fn -Echo pending; Add Jardiance as Out-pt    A fib - on Eliquis for [address] [place] [address]    S/p Pacemaker for [facility] - [employer] in 2017. Follows with [facility]    History of NSVT    HTN, controlled    HLD, on statin therapy     Type II diabetes, On Insulin     Anemia - Monitor H/H    Obesity - Diet, exercise and weight loss discussed.            Above recommendations discussed with him      Electronically signed by Varinder So MD on 8/23/2022 at 3:13 PM  [facility] ([facility] Cardiology

## 2022-08-23 NOTE — ED NOTES
Patient report to [facility].  Patient placed in transport      [facility]  08/22/22 2005

## 2022-08-23 NOTE — CONSULTS
CHF NURSE NAVIGATOR CONSULT NOTE:      Patient currently admitted with diagnosis of Diastolic heart failure. New echo pending completion. Patient was alert and oriented during the consultation. He was engaged and asked appropriate questions throughout the education session. He is agreeable to continued self monitoring, following his low sodium diet, and utilizing the CHF clinic for symptom management when needed. Patient states he was away on a camping trip, he doesn't recall eating any super salty meals and denies medication non compliance. Scheduling with the CHF clinic and PCPYes. Future Appointments   Date Time Provider Stacie Mtz   8/31/2022  8:30 AM Madeline Flores MD [facility]   9/1/2022  9:45 AM [facility] ROOM 1 [facility]       Barriers to Care:  Contributing risk factors for Heart Failure are identified as none. The patient is ordered:  Diet: ADULT DIET; Regular; 4 carb choices (60 gm/meal); Low Sodium (2 gm); 1000 ml   Sodium controlled diet Yes  Fluid restriction daily ordered (fluid restriction recommended if patient is hyponatremic and/or diuretic is initiated or increased) Yes  FR:   Daily Weights: Patient Vitals for the past 96 hrs (Last 3 readings):   Weight   08/23/22 0000 246 lb (111.6 kg)     I/O:   Intake/Output Summary (Last 24 hours) at 8/23/2022 1001  Last data filed at 8/23/2022 6466  Gross per 24 hour   Intake 180 ml   Output 2850 ml   Net -2670 ml              We reviewed the introduction to Heart Failure, the HF zones, signs and symptoms to report on day 1 of onset, medications, medication compliance, the importance of obtaining daily weights, following a low sodium diet, reading food labels for the sodium content, keeping physician appointments, and smoking cessation. We discussed writing / tracking daily weights on a calendar / log because a 5 pound gain in 1 week can sneak up if you are not tracking it.           I advised patient they can reduce the risk for Heart Failure exacerbations by modifying / controlling the risk factors. We discussed self-managed care which includes the following:  to take medications as prescribed, report any intolerable side effects of medications to the cardiologist / doctor, do not just stop taking the medication; follow a cardiac heart healthy / low sodium diet; weigh yourself daily, exercise regularly- per doctor recommendation and not to smoke or use an excess amount of alcohol. We discussed calling the cardiologist / doctor with a weight gain of 3 pounds in one day or a total of 5 pounds or more in one week. Also, if you should have a significant weight loss of 3# or more in one day to call the doctor, they may need to decrease or hold the diuretic dose. On days you feels nauseated and not eating / drinking, having emesis or diarrhea,  informed to call the cardiologist  / doctor, they may need to decrease or hold diuretic to avoid dehydration. I stressed the importance of informing their cardiologist the first day of onset of any of the signs and symptoms in the \"Yellow Zone\" of the HF Zones. Patient verbalizes understanding. Greater than 30 minutes was spent educating patient. The Heart Failure Booklet given to the patient with additional patient education addressing:  What is Heart Failure? Things You Can Do to Live Well with HF  How to Take Your Medications  How to Eat Less Salt  [place] its Salt?   Exercising Well with Heart Failure  Signs and symptoms of HF to report  Weight Yourself Each Day  Home Self Management- activity, weight tracking, taking medications as prescribed, meals /diet planning (sodium and fluid restriction), how to read food labels, keeping physician follow ups, smoking cessation, follow the Heart Failure Zones  The Heart Failure zones  Every Dose Every Day      Instructed  to call 911 if you have any of the following symptoms:       Struggling to breathe unrelieved with rest,     Having chest pain     Have confusion or cant think clearly          Terri Recinos, RN BSN, RN  Heart Failure Navigator        CONGESTIVE HEART FAILURE (CHF) AHA GUIDELINES  (Must be completed for Primary Diagnosis CHF or History of CHF)    Discharge Plan:  I placed the Heart Failure Home Instructions in patient's discharge instructions. Per Heart Failure GWTG, the patient should have a follow-up appointment made within 7 days of discharge.     New Diagnosis No    ECHO Results most recent:  Lab Results   Component Value Date    LVEF 58 2021                                        Social History     Tobacco Use   Smoking Status Former    Packs/day: 0.10    Years: 35.00    Pack years: 3.50    Types: Cigarettes    Quit date: 2004    Years since quittin.7   Smokeless Tobacco Former    Quit date:         Immunization History   Administered Date(s) Administered     Influenza, Sami Caodaism (age 72 y+), High Dose 10/11/2021    COVID-19, MODERNA BLUE border, Primary or Immunocompromised, (age 12y+), IM, 100 mcg/0.5mL 2021, 2021, 2021    Influenza 2013    Influenza Virus Vaccine 10/21/2010, 2013, 10/28/2014, 10/14/2015, 2016, 10/30/2017, 2020    Influenza, FLUAD, (age 72 y+), Adjuvanted 2020    Influenza, FLUARIX, FLULAVAL, (age 10 mo+) AND AFLURIA, FLUZONE (age 1 y+), PF 10/01/2018, 2020    Influenza, Quadv, 6 mo and older, IM (Fluzone, Flulaval) 10/30/2017    Influenza, Triv, 3 Years and older, IM (Afluria (5 yrs and older) 2016    Influenza, Triv, inactivated, subunit, adjuvanted, IM (Fluad 65 yrs and older) 10/18/2018, 10/15/2019    Pneumococcal Conjugate 13-valent ([id number]) 2017    Pneumococcal Conjugate 7-valent (Prevnar7) 08/10/2007    Pneumococcal Polysaccharide ([id number]) 2012, 2018, 10/01/2018    Td, unspecified formulation 2005    Tdap (Boostrix, Adacel) 2016    Zoster Live (Zostavax) 10/23/2015

## 2022-08-23 NOTE — PLAN OF CARE
Problem: Discharge Planning  Goal: Discharge to home or other facility with appropriate resources  8/23/2022 1113 by Gonzalo Montemayor RN  Outcome: Progressing  8/23/2022 0314 by Radha Mario RN  Outcome: Progressing  Flowsheets  Taken 8/23/2022 0021  Discharge to home or other facility with appropriate resources:   Identify barriers to discharge with patient and caregiver   Identify discharge learning needs (meds, wound care, etc)  Taken 8/23/2022 0000  Discharge to home or other facility with appropriate resources:   Identify barriers to discharge with patient and caregiver   Identify discharge learning needs (meds, wound care, etc)     Problem: Pain  Goal: Verbalizes/displays adequate comfort level or baseline comfort level  8/23/2022 1113 by Gonzalo Montemayor RN  Outcome: Progressing  8/23/2022 0314 by Radha Mario RN  Outcome: Progressing     Problem: ABCDS Injury Assessment  Goal: Absence of physical injury  8/23/2022 1113 by Gonzalo Montemayor RN  Outcome: Progressing  8/23/2022 0314 by Radha Mario RN  Outcome: Progressing     Problem: Safety - Adult  Goal: Free from fall injury  8/23/2022 1113 by Gonzalo Montemayor RN  Outcome: Progressing  8/23/2022 0314 by Radha Mario RN  Outcome: Progressing     Problem: Cardiovascular - Adult  Goal: Maintains optimal cardiac output and hemodynamic stability  Outcome: Progressing  Flowsheets (Taken 8/23/2022 0000 by Radha Mario RN)  Maintains optimal cardiac output and hemodynamic stability:   Monitor blood pressure and heart rate   Monitor urine output and notify Licensed Independent Practitioner for values outside of normal range   Assess for signs of decreased cardiac output     Problem: Metabolic/Fluid and Electrolytes - Adult  Goal: Hemodynamic stability and optimal renal function maintained  Outcome: Progressing  Flowsheets (Taken 8/23/2022 0000 by Radha Mario RN)  Hemodynamic stability and optimal renal function maintained:   Monitor labs and assess for signs and symptoms of volume excess or deficit   Monitor intake, output and patient weight   Monitor urine specific gravity, serum osmolarity and serum sodium as indicated or ordered     Problem: Chronic Conditions and Co-morbidities  Goal: Patient's chronic conditions and co-morbidity symptoms are monitored and maintained or improved  Outcome: Progressing  Flowsheets (Taken 8/23/2022 0000 by Fritzi Fothergill, RN)  Care Plan - Patient's Chronic Conditions and Co-Morbidity Symptoms are Monitored and Maintained or Improved: Monitor and assess patient's chronic conditions and comorbid symptoms for stability, deterioration, or improvement

## 2022-08-23 NOTE — PLAN OF CARE
Patient's chart updated to reflect:      . - HF care plan, HF education points and HF discharge instructions.  -Orders: 2 gram sodium diet, daily weights, I/O.  -PCP and cardiology follow up appointments to be scheduled within 7 days of hospital discharge. -CHF education session will be provided to the patient prior to hospital discharge.     Nelly Gill RN RN, BSN  Heart Failure Navigator

## 2022-08-23 NOTE — DISCHARGE INSTRUCTIONS
Internal medicine    Follow ups  Please follow up with the internal medicine clinic at [facility] appointment has been scheduled for 8/31/2022 at 8:30AM   Please keep all other follow up appointments:  CHF clinic - your appointment is scheduled for 9/1/2022 at 9:45 AM   Cardiology - please call [phone] to make an appointment within 1-2 weeks of hospital discharge     Changes in healthcare   Please take all medications as indicated  Diet:  Low sodium     Activity: activity as tolerated    Changes to your medications  If you notice a weight gain of more than 3lbs in 1 day, or more than 5lbs in 3 days, please [address] and call the Internal Medicine office immediately. Please contact us if you have any concerns, wish to change or make an appointment:  Internal medicine clinic   Phone: [phone]  Fax: [phone]  [address] [place] [address] [address]  Or please call the nurse line at [phone]. Should you have further questions in regards to this visit, you can review your clinical note and after visit summary document on your [employer] account. Other questions can be directed to our nurse line at [phone]. Other than any new prescriptions given to you today, the list of home medications on this After Visit Summary are based on information provided to us from you and your healthcare providers. This information, including the list, dose, and frequency of medications is only as accurate as the information you provided. If you have any questions or concerns about your home medications, please contact your Primary Care Physician for further clarification. HEART FAILURE  / CONGESTIVE HEART FAILURE  DISCHARGE INSTRUCTIONS:  GUIDELINES TO FOLLOW AT HOME    Self- Managed Care:     MEDICATIONS:  Take your medication as directed.  If you are experiencing any side effects, inform your doctor, Do not stop taking any of your medications without letting your doctor know. Check with your doctor before taking any over-the-counter medications / herbal / or dietary supplements. They may interfere with your other medications. Do not take ibuprofen (Advil or Motrin) and naproxen (Aleve) without talking to your doctor first. They could make your heart failure worse. WEIGHT MONITORING:   Weigh yourself everyday (with the same scale) around the same time of the day and write it down. (you can chart them on a calendar or keep track of them on paper. Notify your doctor of a weight gain of 3 pounds or more in 1 day   OR a total of 5 pounds or more in 1 week    Take your weight record to your doctor visits  Also, the same goes if you loose more than 3# in one day, let your heart doctor know. DIET:   Cardiac heart healthy diet- Low saturated / low trans fat, no added salt, caffeine restricted, Low sodium diet-   No more than 2,000mg (2 grams) of salt / sodium per day (which equals to a little less than  a teaspoon of salt)  If your doctor wants you on a fluid restriction. ..it is usually recommended a fluid limit of 2,000cc -  Fluid restriction- 2,000 ml (milliliters) = 64 ounces = you can have 8 glasses of fluid per day (each glass 8 ounces)    Follow a low salt diet - avoid using salt at the table, avoid / limit use of canned soups, processed / packaged foods, salted snacks, olives and pickles. Do not use a salt substitute without checking with your doctor, they may contain a high amount of potassioum. (Mrs. Mitesh Oliva is safe to use).     Limit the use of alcohol       CALL YOUR DOCTOR THE FIRST DAY YOU NOTICE ANY OF THESE   SYMPTOMS:  You have a weight gain of 3 pounds or more in 1 day         OR 5 pounds or more in one week  More shortness of breath  More swelling of your stomach, legs, ankles or feet  Feeling more tired, No energy  Dry hacky cough  Dizziness  More chest pain / discomfort       (CALL 911 IF ANY OF THE FOLLOWING OCCURS  Chest pain (not relieved with nitroglycerine, if you have been prescribed this medication)  Severe shortness of breath  Faint / Pass out  Confusion / cannot think clearly  If symptoms get worse           SMOKING - TOBACCO USE:  * IF YOU SMOKE - STOP! Kick the habit. [address] Program is offered at [facility] [address] [address] [facility]. Call [phone] extension [facility] for more information. ACTIVITY:   (Ask your doctor when you will be able to return to work and before starting any exercise program.  Do not drive unless unless your doctor has given you permission to do so). Start light exercise. Even if you can only do a small amount, exercise will help you get stronger, have more energy, help manage your weight and decrease  stress. Walking is an easy way to get exercise. Start out slowly and  increase the amount you walk as tolerated  If you become short of breath, dizzy or have chest pain; stop, sit down, and rest.  If you feel \"wiped out\" the day after you exercise, walk at a slower pace or for a shorter distance. You can gradually increase the pace or amount of time. (Do not exercise right after a meal or in extreme temperatures, such as above 85 degrees, if the air is really humid, or wind chill is less than 20 degrees)                                             ADDITIONAL INFORMATION:  Avoid getting sick from colds and the flu. Stay away from friends or family that you know may have a contagious illness  Get plenty of rest   Get a flu shot each year. Get a pneumococcal vaccine shot. If you have had one before, ask your doctor whether you need another dose. My Goal for Self-management of Heart Failure Includes 5 steps :    1. Notice a change in symptoms ( weight gain, short of breath, leg swelling, decreased activity level, bloating. ...)    2. Evaluate the change: (use the Heart Failure Zones )     3.  Decide to take action: decide what your options are, such as: (call your doctor for an extra visit, take a prescribed medication, such as your water pill if your doctor has given you directions to do so, [address])    4. Come up with a strategy:  (now you call the doctor for advice / appointment. This is where you take action!!! Do not wait, catch the symptom early and treat it before it worsens. 5. Evaluate the response: The next day, check your Heart Failure Zones: are you in the GREEN ZONE (safe zone)? Worsening symptoms of YELLOW ZONE? Or have you moved to the RED ZONE and need to call 911 or go to the Emergency Room for evaluation? Call your doctor's office to update them on your symptoms of heart failure.

## 2022-08-24 ENCOUNTER — HOSPITAL ENCOUNTER (OUTPATIENT)
Dept: OTHER | Age: 67
Setting detail: THERAPIES SERIES
Discharge: HOME | End: 2022-08-24

## 2022-08-24 VITALS
SYSTOLIC BLOOD PRESSURE: 122 MMHG | WEIGHT: 246 LBS | TEMPERATURE: 98 F | HEIGHT: 67 IN | OXYGEN SATURATION: 94 % | DIASTOLIC BLOOD PRESSURE: 60 MMHG | BODY MASS INDEX: 38.61 KG/M2 | RESPIRATION RATE: 18 BRPM | HEART RATE: 73 BPM

## 2022-08-24 LAB
METER GLUCOSE: 185 MG/DL (ref 74–99)
METER GLUCOSE: 204 MG/DL (ref 74–99)

## 2022-08-24 PROCEDURE — 94640 AIRWAY INHALATION TREATMENT: CPT

## 2022-08-24 PROCEDURE — 6360000002 HC RX W HCPCS: Performed by: STUDENT IN AN ORGANIZED HEALTH CARE EDUCATION/TRAINING PROGRAM

## 2022-08-24 PROCEDURE — 99232 SBSQ HOSP IP/OBS MODERATE 35: CPT | Performed by: INTERNAL MEDICINE

## 2022-08-24 PROCEDURE — 6370000000 HC RX 637 (ALT 250 FOR IP): Performed by: INTERNAL MEDICINE

## 2022-08-24 PROCEDURE — 6370000000 HC RX 637 (ALT 250 FOR IP): Performed by: STUDENT IN AN ORGANIZED HEALTH CARE EDUCATION/TRAINING PROGRAM

## 2022-08-24 PROCEDURE — 82962 GLUCOSE BLOOD TEST: CPT

## 2022-08-24 PROCEDURE — 2580000003 HC RX 258: Performed by: STUDENT IN AN ORGANIZED HEALTH CARE EDUCATION/TRAINING PROGRAM

## 2022-08-24 RX ADMIN — METOPROLOL SUCCINATE 12.5 MG: 25 TABLET, EXTENDED RELEASE ORAL at 10:58

## 2022-08-24 RX ADMIN — BUDESONIDE 500 MCG: 0.5 SUSPENSION RESPIRATORY (INHALATION) at 08:54

## 2022-08-24 RX ADMIN — LEVOTHYROXINE SODIUM 88 MCG: 0.09 TABLET ORAL at 06:45

## 2022-08-24 RX ADMIN — ATORVASTATIN CALCIUM 40 MG: 40 TABLET, FILM COATED ORAL at 10:58

## 2022-08-24 RX ADMIN — ARFORMOTEROL TARTRATE 15 MCG: 15 SOLUTION RESPIRATORY (INHALATION) at 08:54

## 2022-08-24 RX ADMIN — BUMETANIDE 2 MG: 1 TABLET ORAL at 10:58

## 2022-08-24 RX ADMIN — APIXABAN 5 MG: 5 TABLET, FILM COATED ORAL at 10:58

## 2022-08-24 RX ADMIN — INSULIN LISPRO 1 UNITS: 100 INJECTION, SOLUTION INTRAVENOUS; SUBCUTANEOUS at 12:54

## 2022-08-24 RX ADMIN — GABAPENTIN 300 MG: 300 CAPSULE ORAL at 10:58

## 2022-08-24 RX ADMIN — SODIUM CHLORIDE, PRESERVATIVE FREE 10 ML: 5 INJECTION INTRAVENOUS at 11:00

## 2022-08-24 RX ADMIN — INSULIN LISPRO 10 UNITS: 100 INJECTION, SOLUTION INTRAVENOUS; SUBCUTANEOUS at 12:53

## 2022-08-24 NOTE — PROGRESS NOTES
[facility]        CARDIOLOGY                 INPATIENT PROGRESS NOTE          PATIENT SEEN IN FOLLOW UP FOR: CHF    Hospital Day: 3     Ilia Jefferson is a 74year old patient known to Dr. Tito Patelirs: Denies any CP, breathing OK, No orthiopena    ROS: Review of rest of 12 systems negative except as mentioned above    OBJECTIVE: No acute distress. See Assessment     Diagnostics:       Telemetry: Reviewed    Echo 8/23/22      Intake/Output Summary (Last 24 hours) at 8/24/2022 0842  Last data filed at 8/24/2022 0649  Gross per 24 hour   Intake 670 ml   Output 3025 ml   Net -2355 ml       Labs:   CBC:   Recent Labs     08/22/22  1238 08/23/22  0507   WBC 9.2 7.7   HGB 10.3* 10.8*   HCT 32.6* 33.4*    190     BMP:   Recent Labs     08/22/22  1238 08/23/22  0507    140   K 4.4 4.3   CO2 24 23   BUN 29* 28*   CREATININE 1.3* 1.2   LABGLOM 55 >60   CALCIUM 9.1 9.2     Mag: No results for input(s): MG in the last 72 hours. Phos: No results for input(s): PHOS in the last 72 hours. TSH: No results for input(s): TSH in the last 72 hours. HgA1c:     BNP: No results for input(s): BNP in the last 72 hours. PT/INR: No results for input(s): PROTIME, INR in the last 72 hours. APTT:No results for input(s): APTT in the last 72 hours. CARDIAC ENZYMES:No results for input(s): CKTOTAL, CKMB, CKMBINDEX, TROPONINI in the last 72 hours.   FASTING LIPID PANEL:  Lab Results   Component Value Date/Time    CHOL 120 06/14/2021 11:40 AM    HDL 24 06/14/2021 11:40 AM    LDLCALC 63 06/14/2021 11:40 AM    TRIG 165 06/14/2021 11:40 AM     LIVER PROFILE:  Recent Labs     08/22/22  1238   AST 16   ALT 25   LABALBU 4.0       Current Inpatient Medications:   metoprolol succinate  12.5 mg Oral Daily    atorvastatin  40 mg Oral Daily    bumetanide  2 mg Oral Daily    apixaban  5 mg Oral BID    gabapentin  300 mg Oral BID    insulin glargine-yfgn  40 Units SubCUTAneous Nightly    insulin lispro  10 Units SubCUTAneous TID AC    insulin lispro  0-4 Units SubCUTAneous TID WC    insulin lispro  0-4 Units SubCUTAneous Nightly    levothyroxine  88 mcg Oral Daily    sodium chloride flush  5-40 mL IntraVENous 2 times per day    budesonide  0.5 mg Nebulization BID    And    Arformoterol Tartrate  15 mcg Nebulization BID       IV Infusions (if any):   sodium chloride      dextrose           PHYSICAL EXAM:     CONSTITUTIONAL:   BP (!) 113/55   Pulse 70   Temp 97.9 °F (36.6 °C) (Oral)   Resp 18   Ht 5' 7\" (1.702 m)   Wt 246 lb (111.6 kg)   SpO2 91%   BMI 38.53 kg/m²   Pulse  Av  Min: 62  Max: 70  Systolic (01ISP), GKL:950 , Min:98 , XQX:068    Diastolic (37OLA), XHK:48, Min:55, Max:67        CONST:  Well developed, appears stated age. Awake, alert and no apparent distress. HEENT:   Head- Normocephalic  Eyes- Conjunctivae pink, no icterus  Throat- Oral mucosa moist  Neck-  No stridor, no jugular venous distention. No carotid bruit. CHEST: Chest symmetrical and non-tender to palpation. No accessory muscle use  RESPIRATORY: Lung sounds - Few rhonchi  CARDIOVASCULAR:     Heart Inspection-pacer site [address] [place]   Heart Palpation- No thrills. Heart Ausculation- Regular rate and rhythm, 1/6 systolic murmur. No s3 or rub   EXT:+ lower extremity edema. Distal pulses palpable, no cyanosis   ABDOMEN: Soft, non-tender to light palpation. Obese, Bowel sounds present. No abdominal bruit  MS: n/a  : Deferred  RECTAL: Deferred  SKIN: Warm and dry   NEURO / PSYCH: Oriented to person, place; Good mood and affect. IMPRESSION / RECOMMENDATIONS:     Acute on chronic HFpEF - Negative 2355cc last 24 hrs, Continue Diuretics, Monitor Wts, I/Os, BP, renal  Fn. Add Jardiance as Out-pt     A fib - on Eliquis for [address]     S/p Pacemaker for [facility] - "[employer] in 2017.   Follows with [facility]     HTN - Controlled     HLD - On statin therapy      Type II diabetes, On Insulin      Chronic Anemia - Monitor H/H     Obesity - Diet, exercise and weight loss discussed. Home later today or tomorrow     Above recommendations discussed with him    F/u with [facility] 1-2 weeks    Electronically signed by Matias Valencia MD on 8/24/2022 at 8:42 [facility]     Addendum;    D/w Dr Stevo Siddiqui.     Matias Valencia MD

## 2022-08-24 NOTE — CARE COORDINATION
8/24/2022 - Updated CM note - Discharge order on chart. Spoke with pt and he does not anticipate any needs for discharge. He states his wife will provide transportation home. SW/CM will follow.

## 2022-08-24 NOTE — DISCHARGE SUMMARY
moist.      Pharynx: Oropharynx is clear. Eyes:      Conjunctiva/sclera: Conjunctivae normal.   Cardiovascular:      Rate and Rhythm: Normal rate. Pulses: Normal pulses. Heart sounds: Normal heart sounds. Neurological:      Mental Status: He is alert. Echo Complete:   Summary   Normal left ventricular chamber size. Normal left ventricular systolic function, EF 62%. Mild left ventricular concentric hypertrophy noted. Stage II diastolic dysfunction. Left atrium is enlarged. Interatrial septum not well visualized but appears intact. Normal right ventricle size and function, TAPSE 1.9cm. Pacemaker lead noted. Trace of mitral regurgitation present. No mitral valve prolapse. The aortic valve appears moderately sclerotic. No hemodynamically significant aortic stenosis. There is mild to moderate tricuspid regurgitation. Moderate Pulmonary hypertension, RVSP 54mmHg. Normal aortic root size. No evidence of pericardial effusion. No intra cardiac mass or thrombus. Compared to prior echo from 9/20/2021. Pending test results: none    Consults:  Cardiology    Procedures:  2D echo    Condition at discharge: Stable    Disposition: home    Discharge Medications:  Current Discharge Medication List        CONTINUE these medications which have NOT CHANGED    Details   acetaminophen-codeine (TYLENOL #3) 300-30 MG per tablet TAKE ONE TABLET BY MOUTH EVERY 6 HOURS AS NEEDED FOR PAIN      gabapentin (NEURONTIN) 300 MG capsule TAKE ONE CAPSULE BY MOUTH TWO TIMES A DAY      metOLazone (ZAROXOLYN) 2.5 MG tablet take one tablet by mouth 3 three times a week monday wednesday and friday      spironolactone (ALDACTONE) 25 MG tablet TAKE ONE TABLET (25 MG TOTAL) BY MOUTH 3 TIMES WEEKLY  MONDAY WEDNESDAY AND FRIDAY EVENING      ELIQUIS 5 MG TABS tablet Take 1 tablet by mouth in the morning and 1 tablet before bedtime.   Qty: 180 tablet, Refills: 1    Associated Diagnoses: Persistent atrial fibrillation (HCC)      atorvastatin (LIPITOR) 40 MG tablet TAKE ONE TABLET BY MOUTH DAILY  Qty: 90 tablet, Refills: 1    Associated Diagnoses: Type 2 diabetes mellitus with diabetic autonomic neuropathy, with long-term current use of insulin (HCC)      metoprolol succinate (TOPROL XL) 25 MG extended release tablet Take 1 tablet by mouth in the morning. Qty: 90 tablet, Refills: 1    Associated Diagnoses: Chronic heart failure with preserved ejection fraction ([address] Essential hypertension      levothyroxine (SYNTHROID) 88 MCG tablet Take 1 tablet by mouth in the morning. Qty: 90 tablet, Refills: 1    Comments: Requesting 1 year supply  Associated Diagnoses: Acquired hypothyroidism      mometasone-formoterol (DULERA) 200-5 MCG/ACT inhaler Inhale 2 puffs into the lungs in the morning and 2 puffs before bedtime. Rinse mouth after using. PAP medication. Cecilia Cammie: 3 each, Refills: 2    Associated Diagnoses: Restrictive lung disease secondary to obesity      insulin lispro, 1 Unit Dial, (HUMALOG KWIKPEN) 100 UNIT/ML SOPN Inject 20 Units into the skin in the morning and 20 Units at noon and 20 Units in the evening. Inject before meals. Do all this for 225 doses. Qty: 27 mL, Refills: 1    Comments: Given 3 boxes containing 5 x 3 mL pen each  Associated Diagnoses: Type 2 diabetes mellitus with diabetic autonomic neuropathy, with long-term current use of insulin (HCC)      bumetanide (BUMEX) 2 MG tablet Take 1 tablet by mouth daily  Qty: 30 tablet, Refills: 3      insulin glargine (LANTUS SOLOSTAR) 100 UNIT/ML injection pen Inject 55 Units into the skin nightly  Qty: 15 pen, Refills: 3      !! Insulin Pen Needle (PEN NEEDLES) 31G X 6 MM MISC Once daily. May substitute brand for insurance coverage.   Qty: 100 each, Refills: 3      empagliflozin (JARDIANCE) 25 MG tablet Take 1 tablet by mouth daily  Qty: 90 tablet, Refills: 3    Associated Diagnoses: Type 2 diabetes mellitus with diabetic autonomic neuropathy, with long-term current use of insulin ([facility])      metFORMIN (GLUCOPHAGE) 500 MG tablet Take 1 tablet by mouth 2 times daily (with meals)  Qty: 60 tablet, Refills: 5    Associated Diagnoses: Type 2 diabetes mellitus with diabetic autonomic neuropathy, with long-term current use of insulin ([facility])      Potassium 99 MG TABS Take 1 tablet by mouth daily      Ferrous Gluconate-C-Folic Acid (IRON-C PO) Take by mouth      aspirin (ASPIR-LOW) 81 MG EC tablet TAKE ONE TABLET BY MOUTH EVERY DAY  Qty: 90 tablet, Refills: 1    Associated Diagnoses: Essential hypertension      !! Insulin Pen Needle (PEN NEEDLES) 32G X 6 MM MISC Take insulin daily  Qty: 100 each, Refills: 1    Associated Diagnoses: Type 2 diabetes mellitus with hyperglycemia, with long-term current use of insulin ([address]      Lancets MISC Give Delica lancets. Tests twice a day A.M. And P.M  Qty: 200 each, Refills: 1    Associated Diagnoses: Type 2 diabetes mellitus with diabetic autonomic neuropathy, with long-term current use of insulin ([facility])      blood glucose test strips (ONETOUCH VERIO) strip TEST IN THE MORNING AND IN THE EVENING  Qty: 200 each, Refills: 1    Associated Diagnoses: Type 2 diabetes mellitus with diabetic autonomic neuropathy, with long-term current use of insulin ([address]      Misc. Devices MISC Please add portability to current O2 order. Resp to evaluate patient for OCD device. Qty: 1 each, Refills: 0    Associated Diagnoses: Chronic obstructive pulmonary disease, unspecified COPD type ([address]      Misc. Devices KIT Dispense 1 blood pressure cuff.   Qty: 1 kit, Refills: 0    Associated Diagnoses: Essential hypertension      OXYGEN Inhale 2.5 L into the lungs nightly  Qty: 1 Device, Refills: 99      zoster recombinant adjuvanted vaccine (SHINGRIX) 50 MCG/0.5ML SUSR injection 1 dose now and repeat in 2-6 months  Qty: 0.5 mL, Refills: 0      acetaminophen (TYLENOL) 500 MG tablet Take 1,000 mg by mouth daily as needed for Pain       !! - Potential duplicate medications found. Please discuss with provider. Activity: activity as tolerated  Diet: cardiac diet    Follow-up appointments:   8/31 at 8:30: Internal Medicine clinic follow-up  9/1 at  9:45: CHF Clinic    Patient Instructions:   Changes in healthcare   Please take all medications as indicated  Diet: Low sodium    Activity: activity as tolerated    Changes to your medications  If you notice a weight gain of more than 3lbs in 1 day, or more than 5lbs in 3 days, please TAKE AN EXTRA HALF A TABLET OF YOUR [address] and call the Internal Medicine office immediately. Please contact us if you have any concerns, wish to change or make an appointment:  Internal medicine clinic   Phone: [phone]  Fax: [phone]  [address]  Or please call the nurse line at [phone]. Should you have further questions in regards to this visit, you can review your clinical note and after visit summary document on your [employer] account. Other questions can be directed to our nurse line at [phone]. Other than any new prescriptions given to you today, the list of home medications on this After Visit Summary are based on information provided to us from you and your healthcare providers. This information, including the list, dose, and frequency of medications is only as accurate as the information you provided. If you have any questions or concerns about your home medications, please contact your Primary Care Physician for further clarification.     Ashwin Galaviz MD  PGY 1   3:49 PM 8/24/2022

## 2022-08-24 NOTE — DISCHARGE SUMMARY
[address]  Discharge Summary    PCP: Dulce Anthony MD    Admit Date:8/22/2022  Discharge Date: 8/24/2022    Admission Diagnosis:   Shortness of breath 2/2 CHF exacerbation  T2DM  CKD stage 3b  Hx HLD  Hx Obesity hypoventilation syndrome  Persistent atrial fibrillation  HTN  Hx hypothyroidsim   Hx sinus node dysfunction s/p pacemaker in 2017    Discharge Diagnosis:  CHF exacerbation   Hx persistent atrial fibrillation   Hx sinus node dysfunction s/p pacemaker in 2017   HTN   Hx HLD   CKD stage 3b   Hx hypothyroidism     Hospital Course:   On 8/22, Joan Worley went to an internal medicine clinic appointment where he complained of increased shortness of breath, and he appeared fluid overloaded on exam.  He had a 20 pound weight gain over 2 weeks and 10 pound weight gain over the week prior. He was instructed to go to the emergency room for treatment of acute heart failure exacerbation. He was treated with 2 mg of IV Bumex. During admission he had adequate urine output with 5.8 L out during admission. His work of breathing, edema, and O2 requirement improved during his admission. He was continued on his home medication regimen which included 2 mg Bumex daily. He had an echo during admission with and EF of 65%, and this echo did not show any changes from his most recent echo in 9/2021. His medications were discussed with cardiology, and they do not want him to take mitolazone or aldactone. He will be discharged with 2mg bumex daily with instructions to take 1 mg PRN for weight gain as detailed in his patient instructions. Significant findings (history and exam, laboratory, radiological, pathology, other tests):   General Appearance: alert, appears stated age, cooperative, and obese  HEENT:  Head: Normocephalic, no lesions, without obvious abnormality.   Lung: clear to auscultation bilaterally  Heart: regular rate and rhythm, S1, S2 normal, no murmur, click, rub or gallop  Abdomen: soft, non-tender; bowel sounds normal; no masses,  no organomegaly  Extremities:  extremities normal, atraumatic, no cyanosis or edema  Musculokeletal: No joint swelling, no muscle tenderness. ROM normal in all joints of extremities. Neurologic: Mental status: Alert, oriented, thought content appropriate       Echo Complete:   Summary   Normal left ventricular chamber size. Normal left ventricular systolic function, EF 57%. Mild left ventricular concentric hypertrophy noted. Stage II diastolic dysfunction. Left atrium is enlarged. Interatrial septum not well visualized but appears intact. Normal right ventricle size and function, TAPSE 1.9cm. Pacemaker lead noted. Trace of mitral regurgitation present. No mitral valve prolapse. The aortic valve appears moderately sclerotic. No hemodynamically significant aortic stenosis. There is mild to moderate tricuspid regurgitation. Moderate Pulmonary hypertension, RVSP 54mmHg. Normal aortic root size. No evidence of pericardial effusion. No intra cardiac mass or thrombus. Compared to prior echo from 9/20/2021. Pending test results: none    Consults:   Cardiology     Procedures:  2D echo    Condition at discharge: Stable    Disposition: home    Discharge Medications:  Discharge Medication List as of 8/24/2022  1:05 PM        CONTINUE these medications which have NOT CHANGED    Details   acetaminophen-codeine (TYLENOL #3) 300-30 MG per tablet TAKE ONE TABLET BY MOUTH EVERY 6 HOURS AS NEEDED FOR PAINHistorical Med      gabapentin (NEURONTIN) 300 MG capsule TAKE ONE CAPSULE BY MOUTH TWO TIMES A DAYHistorical Med      ELIQUIS 5 MG TABS tablet Take 1 tablet by mouth in the morning and 1 tablet before bedtime. , Disp-180 tablet, R-1, DAWNormal      atorvastatin (LIPITOR) 40 MG tablet TAKE ONE TABLET BY MOUTH DAILY, Disp-90 tablet, R-1Normal      metoprolol succinate (TOPROL XL) 25 MG extended release tablet Take 1 tablet by mouth in the morning., Disp-90 tablet, R-1Normal      levothyroxine (SYNTHROID) 88 MCG tablet Take 1 tablet by mouth in the morning., Disp-90 tablet, R-1Requesting 1 year supplyNormal      mometasone-formoterol (DULERA) 200-5 MCG/ACT inhaler Inhale 2 puffs into the lungs in the morning and 2 puffs before bedtime. Rinse mouth after using. PAP medication. ., Disp-3 each, R-2Normal      insulin lispro, 1 Unit Dial, (HUMALOG KWIKPEN) 100 UNIT/ML SOPN Inject 20 Units into the skin in the morning and 20 Units at noon and 20 Units in the evening. Inject before meals. Do all this for 225 doses. , Disp-27 mL, R-1Given 3 boxes containing 5 x 3 mL pen eachSample      bumetanide (BUMEX) 2 MG tablet Take 1 tablet by mouth daily, Disp-30 tablet, R-3Normal      insulin glargine (LANTUS SOLOSTAR) 100 UNIT/ML injection pen Inject 55 Units into the skin nightly, Disp-15 pen, R-3Normal      !! Insulin Pen Needle (PEN NEEDLES) 31G X 6 MM MISC Disp-100 each, R-3, NormalOnce daily. May substitute brand for insurance coverage. empagliflozin (JARDIANCE) 25 MG tablet Take 1 tablet by mouth daily, Disp-90 tablet, R-3NO PRINT      metFORMIN (GLUCOPHAGE) 500 MG tablet Take 1 tablet by mouth 2 times daily (with meals), Disp-60 tablet, R-5Normal      Potassium 99 MG TABS Take 1 tablet by mouth dailyHistorical Med      Ferrous Gluconate-C-Folic Acid (IRON-C PO) Take by mouthHistorical Med      aspirin (ASPIR-LOW) 81 MG EC tablet TAKE ONE TABLET BY MOUTH EVERY DAY, Disp-90 tablet, R-1Normal      !! Insulin Pen Needle (PEN NEEDLES) 32G X 6 MM MISC Take insulin daily, Disp-100 each, R-1Normal      Lancets MISC Disp-200 each, R-1, NormalGive Delica lancets. Tests twice a day A.M. And P.M      blood glucose test strips (ONETOUCH VERIO) strip TEST IN THE MORNING AND IN THE EVENING, Disp-200 each, R-1Normal      Misc. Devices MISC Disp-1 each, R-0, PrintPlease add portability to current O2 order. Resp to evaluate patient for OCD device. Misc.  Devices KIT Disp-1 kit, R-0, PrintDispense 1 blood pressure cuff. OXYGEN Inhale 2.5 L into the lungs nightly, Disp-1 Device, R-99Normal      zoster recombinant adjuvanted vaccine (SHINGRIX) 50 MCG/0.5ML SUSR injection 1 dose now and repeat in 2-6 months, Disp-0.5 mL, R-0Normal      acetaminophen (TYLENOL) 500 MG tablet Take 1,000 mg by mouth daily as needed for PainHistorical Med       !! - Potential duplicate medications found. Please discuss with provider. STOP taking these medications       metOLazone (ZAROXOLYN) 2.5 MG tablet Comments:   Reason for Stopping:         spironolactone (ALDACTONE) 25 MG tablet Comments:   Reason for Stopping:               Activity: activity as tolerated  Diet: cardiac diet    Follow-up appointments:   8/31 at 8:30: Internal Medicine clinic follow-up  9/1 at  9:45: CHF Clinic    Patient Instructions:   Changes in healthcare   Please take all medications as indicated  Diet: Low sodium    Activity: activity as tolerated     Changes to your medications  If you notice a weight gain of more than 3lbs in 1 day, or more than 5lbs in 3 days, please TAKE AN EXTRA HALF A TABLET OF YOUR [address] and call the Internal Medicine office immediately. Please contact us if you have any concerns, wish to change or make an appointment:  Internal medicine clinic   Phone: [phone]  Fax: [phone]  [address]  Or please call the nurse line at [phone]. Should you have further questions in regards to this visit, you can review your clinical note and after visit summary document on your [employer] account. Other questions can be directed to our nurse line at [phone]. Other than any new prescriptions given to you today, the list of home medications on this After Visit Summary are based on information provided to us from you and your healthcare providers.  This information, including the list, dose, and frequency of medications is only as accurate as the information you provided.  If you have any questions or concerns about your home medications, please contact your Primary Care Physician for further     Berny Esparza MD  PGY 1   3:50 PM 8/24/2022

## 2022-08-24 NOTE — PROGRESS NOTES
[address] [place] [address],[address]  Internal Medicine Residency Program  Progress Note - House Team     Patient:  Cuauhtemoc Cm 69 y.o. male MRN: [id number]     Date of Service: 8/24/2022     CC: Shortness of breath f    Days since admission: 2    Subjective     Overnight events: ***    ***    Objective     Physical Exam:  Vitals: /60   Pulse 73   Temp 98 °F (36.7 °C) (Oral)   Resp 18   Ht 5' 7\" (1.702 m)   Wt 246 lb (111.6 kg)   SpO2 94%   BMI 38.53 kg/m²     I & O - 24hr:   Intake/Output Summary (Last 24 hours) at 8/24/2022 1105  Last data filed at 8/24/2022 1055  Gross per 24 hour   Intake 850 ml   Output 3025 ml   Net -2175 ml      General Appearance: {Exam; general:82666::\"alert\",\"appears stated age\",\"cooperative\"}  HEENT:  {Exam; heent:72033}  Neck: {neck exam:83240::\"no adenopathy\",\"no carotid bruit\",\"no JVD\",\"supple, symmetrical, trachea midline\",\"thyroid not enlarged, symmetric, no tenderness/mass/nodules\"}  Lung: {lung exam:04101::\"clear to auscultation bilaterally\"}  Heart: {heart exam:5510::\"regular rate and rhythm, S1, S2 normal, no murmur, click, rub or gallop\"}  Abdomen: {abdominal exam:15878::\"soft, non-tender; bowel sounds normal; no masses,  no organomegaly\"}  Extremities:  {extremity exam:5109::\"extremities normal, atraumatic, no cyanosis or edema\"}  Musculokeletal: No joint swelling, no muscle tenderness. ROM normal in all joints of extremities.    Neurologic: Mental status: {:07883}  Subject  Pertinent Information & Imaging Studies, Consults   carmela  {HBTV:[id number]}    IMAGING:   Imaging Studies:    XR CHEST (2 VW)    Result Date: 8/22/2022  Cardiomegaly with mild interstitial pulmonary edema which is slightly more pronounced than on the previous exam.            PROCEDURES: ***    FLUIDS: ***      Notable Cultures:      Blood cultures   Blood Culture, Routine   Date Value Ref Range Status   09/17/2021 5 Days no growth  Final     Respiratory cultures No results found for: Frutoso Panda Gram Stain Result   Date Value Ref Range Status   05/29/2017   Final    Gram stain performed on unspun fluidPolymorphonuclear leukocytes not seenEpithelial cells not seenRare Erythrocytes     Urine   Urine Culture, Routine   Date Value Ref Range Status   05/25/2017 Growth not present  Final     Legionella No results found for: LABLEGI  C Diff PCR No results found for: CDIFPCR  Wound culture/abscess: No results for input(s): WNDABS in the last 72 hours. Tip culture:No results for input(s): CXCATHTIP in the last 72 hours. Antibiotic  Days  Day started                                  OXYGENATION: ***    DIET: ***        Resident's Assessment and Plan     Kurt Peguero is a 69 y.o. male with  has a past medical history of RADHA (acute kidney injury) ([address] Atrial fibrillation ([address] Carpal tunnel syndrome, Encounter regarding vascular access for dialysis for ESRD ([address] Hyperlipidemia, Hypertension, Hypothyroidism, Lung nodule, Nocturnal hypoxemia due to obesity, Obesity, NIKO (obstructive sleep apnea), Osteoarthritis, Pinched nerve, Restrictive lung disease secondary to obesity, Sinus node dysfunction ([address] and Type II or unspecified type diabetes mellitus without mention of complication, not stated as uncontrolled. came here with CC   Chief Complaint   Patient presents with    Shortness of Breath     Pt states \" I cant breathe\". Pt reports symptoms for couple days. Pt was at clinic and sent over. Pt family states \" he is full of water\"        [address] [facility]: Briefly, ***    Days since admission: ***    Consults:   ***    Assessment:  ***    Problems resolved during this admission:   ***      Inactive Problems :  ***      Plan:  ***    PT/OT: ***  DVT ppx: ***  GI ppx: ***      Next of Kin/ POA:  ***    Code Status:   ***     Disposition:***      Ninoska Combs, PGY-2  Attending physician: Dr. Niki Hurtado       NOTE: This report was transcribed using voice recognition software.  Every effort was made to ensure accuracy; however, inadvertent computerized transcription errors may be present.

## 2022-08-26 ENCOUNTER — TELEPHONE (OUTPATIENT)
Dept: INTERNAL MEDICINE | Age: 67
End: 2022-08-26

## 2022-08-26 NOTE — TELEPHONE ENCOUNTER
Patient's wife called and states someone called her and wanted to know if he was staying here for care or going to [place]. Wife states will be continuing care here.

## 2022-08-29 NOTE — PROGRESS NOTES
Post-Discharge Transitional Care  Follow Up      Kostas Dickensos Deemer   YOB: 1955    Date of Office Visit:  8/31/2022  Date of Hospital Admission: 8/22/22  Date of Hospital Discharge: 8/24/22  Risk of hospital readmission (high >=14%. Medium >=10%) :Readmission Risk Score: 23      Care management risk score Rising risk (score 2-5) and Complex Care (Scores >=6): No Risk Score On File     Non face to face  following discharge, date last encounter closed (first attempt may have been earlier): *No documented post hospital discharge outreach found in the last 14 days    Call initiated 2 business days of discharge: *No response recorded in the last 14 days    ASSESSMENT/PLAN:   Diabetes mellitus type 2 in obese (HCC)  Class 2 severe obesity due to excess calories with serious comorbidity and body mass index (BMI) of 38.0 to 38.9 in [facility])  Type 2 diabetes mellitus with diabetic autonomic neuropathy, with long-term current use of insulin (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 On this date 8/31/2022 I have spent 38 minutes reviewing previous notes, test results and face to face with the patient discussing the diagnosis and importance of compliance with the treatment plan as well as documenting on the day of the visit.        Subjective:   HPI:  Follow up of Hospital problems/diagnosis(es):   George Mishra is a 69 y.o.male with PMH of CHF, type II DM, CKD, HTN, HLD, OHS, persistent AF (on Eliquis, hypothyroidism, sinus node dysfunction s/p pacemaker in 2017 presenting to [facility] for hospital follow-up visit. Patient was admitted from 8/22/22-8/24/22 with CHF exacerbation. Patient was managed with 2 mg Bumex daily. An echo was done during admission: EF 65%, G2 DD, moderate PAH, no cardiac thrombus    As per cardiology recommendation, metolazone and Aldactone was discontinued. Patient was discharged on 2 mg Bumex daily and 1 mg as needed for weight gain > 2 pounds in a week  Patient reports that he has lost approximately 10 pounds of weight. Patient denies any headache, lightheadedness, blurry vision, chest pain, shortness of breath, palpitations, cough, heat/cold intolerance, polyuria, polydipsia, polyphagia, abdominal pain, bowel or bladder complaints. Patient reports compliance with medication and denies any associated side effects with medications. Social history: Patient is a former smoker, quit 15 years ago, previous EtOH abuse, no illicit drug use    Inpatient course: Discharge summary reviewed- see chart.     Interval history/Current status: Stable    Patient Active Problem List   Diagnosis    DM (diabetes mellitus) ([address]    Class 2 obesity due to excess calories with serious comorbidity and body mass index (BMI) of 38.0 to 38.9 in adult    Hyperlipidemia    Essential hypertension    Hypothyroidism    Lung nodules    Arthritis of shoulder region, left    OA (osteoarthritis of the spine)    S/P laminectomy with spinal fusion    Colorectal polyps    Diverticula of colon    NIKO (obstructive sleep apnea)    Hypoxemia    Restrictive lung disease secondary to obesity    Atypical atrial flutter (HCC)    Sinus node dysfunction (HCC)    Pacemaker    Persistent atrial fibrillation ([address]    Diabetes mellitus type 2 in obese (HCC)    Obesity    Chronic obstructive pulmonary disease, unspecified COPD type ([address]    Acute heart failure with preserved ejection fraction (HFpEF) (HCC)    Chronic hypercapnic respiratory failure ([facility] [address]    Encounter regarding vascular access for dialysis for ESRD ([facility] [address]    At risk for colon cancer    Acute on chronic congestive heart failure (HCC)    Acute on chronic diastolic heart failure (HCC)    Hyponatremia    Normocytic anemia    Hypotension    Stage 3b chronic kidney disease ([facility] [address]    Acute on chronic heart failure with preserved ejection fraction [facility]       Medications listed as ordered at the time of discharge from hospital     Medication List            Accurate as of August 31, 2022  9:33 AM. If you have any questions, ask your nurse or doctor. CHANGE how you take these medications      OXYGEN  Inhale 2.5 L into the lungs nightly  What changed: additional instructions            CONTINUE taking these medications      acetaminophen 500 MG tablet  Commonly known as: TYLENOL     acetaminophen-codeine 300-30 MG per tablet  Commonly known as: TYLENOL #3     aspirin 81 MG EC tablet  Commonly known as: Aspir-Low  TAKE ONE TABLET BY MOUTH EVERY DAY     atorvastatin 40 MG tablet  Commonly known as: LIPITOR  TAKE ONE TABLET BY MOUTH DAILY     bumetanide 2 MG tablet  Commonly known as: BUMEX  Take 1 tablet by mouth daily     Eliquis 5 MG Tabs tablet  Generic drug: apixaban  Take 1 tablet by mouth in the morning and 1 tablet before bedtime. empagliflozin 25 MG tablet  Commonly known as: Jardiance  Take 1 tablet by mouth daily     gabapentin 300 MG capsule  Commonly known as: NEURONTIN     insulin lispro (1 Unit Dial) 100 UNIT/ML Sopn  Commonly known as: HumaLOG KwikPen  Inject 20 Units into the skin in the morning and 20 Units at noon and 20 Units in the evening. Inject before meals. Do all this for 225 doses. IRON-C PO     Lancets Misc  Give Delica lancets. Tests twice a day A.M.  And P.M     Lantus SoloStar 100 UNIT/ML injection pen  Generic drug: insulin glargine  Inject 55 Units into the skin nightly     levothyroxine 88 MCG tablet  Commonly known as: SYNTHROID  Take 1 tablet by mouth in the morning. metFORMIN 500 MG tablet  Commonly known as: GLUCOPHAGE  Take 1 tablet by mouth 2 times daily (with meals)     metoprolol succinate 25 MG extended release tablet  Commonly known as: TOPROL XL  Take 1 tablet by mouth in the morning. * Misc. Devices Kit  Dispense 1 blood pressure cuff. * Misc. Devices Misc  Please add portability to current O2 order. Resp to evaluate patient for OCD device. mometasone-formoterol 200-5 MCG/ACT inhaler  Commonly known as: Dulera  Inhale 2 puffs into the lungs in the morning and 2 puffs before bedtime. Rinse mouth after using. PAP medication. Rapp Hallmark Verio strip  Generic drug: blood glucose test strips  TEST IN THE MORNING AND IN THE EVENING     * Pen Needles 32G X 6 MM Misc  Take insulin daily     * Pen Needles 31G X 6 MM Misc  Once daily. May substitute brand for insurance coverage. zoster recombinant adjuvanted vaccine 50 MCG/0.5ML Susr injection  Commonly known as: SHINGRIX  1 dose now and repeat in 2-6 months           * This list has 4 medication(s) that are the same as other medications prescribed for you. Read the directions carefully, and ask your doctor or other care provider to review them with you. Medications marked \"taking\" at this time  Outpatient Medications Marked as Taking for the 8/31/22 encounter (Office Visit) with Varun Solares MD   Medication Sig Dispense Refill    acetaminophen-codeine (TYLENOL #3) 300-30 MG per tablet TAKE ONE TABLET BY MOUTH EVERY 6 HOURS AS NEEDED FOR PAIN      gabapentin (NEURONTIN) 300 MG capsule TAKE ONE CAPSULE BY MOUTH TWO TIMES A DAY      ELIQUIS 5 MG TABS tablet Take 1 tablet by mouth in the morning and 1 tablet before bedtime. 180 tablet 1    atorvastatin (LIPITOR) 40 MG tablet TAKE ONE TABLET BY MOUTH DAILY 90 tablet 1    metoprolol succinate (TOPROL XL) 25 MG extended release tablet Take 1 tablet by mouth in the morning.  90 tablet 1 levothyroxine (SYNTHROID) 88 MCG tablet Take 1 tablet by mouth in the morning. 90 tablet 1    mometasone-formoterol (DULERA) 200-5 MCG/ACT inhaler Inhale 2 puffs into the lungs in the morning and 2 puffs before bedtime. Rinse mouth after using. PAP medication. . 3 each 2    insulin lispro, 1 Unit Dial, (HUMALOG KWIKPEN) 100 UNIT/ML SOPN Inject 20 Units into the skin in the morning and 20 Units at noon and 20 Units in the evening. Inject before meals. Do all this for 225 doses. 27 mL 1    bumetanide (BUMEX) 2 MG tablet Take 1 tablet by mouth daily 30 tablet 3    insulin glargine (LANTUS SOLOSTAR) 100 UNIT/ML injection pen Inject 55 Units into the skin nightly 15 pen 3    Insulin Pen Needle (PEN NEEDLES) 31G X 6 MM MISC Once daily. May substitute brand for insurance coverage. 100 each 3    empagliflozin (JARDIANCE) 25 MG tablet Take 1 tablet by mouth daily 90 tablet 3    metFORMIN (GLUCOPHAGE) 500 MG tablet Take 1 tablet by mouth 2 times daily (with meals) 60 tablet 5    Ferrous Gluconate-C-Folic Acid (IRON-C PO) Take by mouth      aspirin (ASPIR-LOW) 81 MG EC tablet TAKE ONE TABLET BY MOUTH EVERY DAY 90 tablet 1    Insulin Pen Needle (PEN NEEDLES) 32G X 6 MM MISC Take insulin daily 100 each 1    Lancets MISC Give Delica lancets. Tests twice a day A.M. And P.M 200 each 1    blood glucose test strips (ONETOUCH VERIO) strip TEST IN THE MORNING AND IN THE EVENING 200 each 1    Misc. Devices MISC Please add portability to current O2 order. Resp to evaluate patient for OCD device.  1 each 0    OXYGEN Inhale 2.5 L into the lungs nightly (Patient taking differently: Inhale 2.5 L into the lungs nightly Spouse states uses  5 L/min via nc bedtime) 1 Device 99    zoster recombinant adjuvanted vaccine (SHINGRIX) 50 MCG/0.5ML SUSR injection 1 dose now and repeat in 2-6 months 0.5 mL 0    acetaminophen (TYLENOL) 500 MG tablet Take 1,000 mg by mouth daily as needed for Pain          Medications patient taking as of now reconciled 2g/day  Continue to follow-up with cardiology  Continue Bumex 2 mg daily and 1 mg as needed for weight gain > 2 pounds/week  Monitor BMP-ordered    Hx of HTN  BP log at home, SBP 120s  Denies Headache, blurry vision, chest pain, shortness of breath, palpitation, pedal edema  Reports compliance with medication and denies side effects of medication  Plan  Current treatment plan is effective, no change in therapy. Recommended sodium restriction to <2 g/day  Cardiovascular risk and specific lipid/LDL goals reviewed. Continue BP monitoring at home     Hx of DM 2  Current regimen: Insulin 55 units nightly, regular insulin 20 units 3 times daily with meals, Jardiance 25 mg daily, metformin 500 mg twice daily  Last A1c: 11 on 6/23/22-> A1c 7.2 today  LDL: 63 on 0/78/4172  Complications: no   Hypoglycemia episodes: awareness of hypoglycemia. Diet:   Eye check: due -> referral provided  Foot check: 10/11/21  Review of system: No Dizziness/shortness of breath/chest pain/abdominal pain/burning pain with urination   Plan  Ophthalmology referral  Diabetes diet discussed      Hypersomnia 2/2 NIKO vs unlikely narcolepsy  STOP BANG-6, high risk for NIKO  Patient takes frequent daytime naps  Plan  Sleep hygiene discussed, avoiding caffeinated drinks and fluids near nightly naptime  Sleep study referral provided       Hx of A.  Fib  Currently rate controlled  [id number] Score for Atrial Fibrillation Stroke Risk   Risk   Factors  Component Value   C CHF Yes 1   H HTN Yes 1   A2 Age >= 75 No,  (73 y.o.) 0   D DM Yes 1   S2 Prior Stroke/TIA No 0   V Vascular Disease No 0   A Age 74-69 Yes,  (73 y.o.) 1   Sc Sex male 0    [id number]  Score  4   Score last updated 8/31/22 5:07 PM EDT  Continue Eliquis       Normocytic normochromic anemia  Iron panel: Ferritin 124  Hgb 10.8  Plan  Wean iron and folate      RTC: 2 months with PCP      I have reviewed my findings and recommendations with Erin Rocha and Papito Colindres MD PGY-2  8/29/2022 1:07 PM

## 2022-08-31 ENCOUNTER — SOCIAL WORK (OUTPATIENT)
Dept: INTERNAL MEDICINE | Age: 67
End: 2022-08-31

## 2022-08-31 ENCOUNTER — OFFICE VISIT (OUTPATIENT)
Dept: INTERNAL MEDICINE | Age: 67
End: 2022-08-31
Payer: MEDICARE

## 2022-08-31 VITALS
HEART RATE: 99 BPM | SYSTOLIC BLOOD PRESSURE: 120 MMHG | OXYGEN SATURATION: 92 % | BODY MASS INDEX: 38.45 KG/M2 | WEIGHT: 245 LBS | RESPIRATION RATE: 18 BRPM | TEMPERATURE: 96.8 F | DIASTOLIC BLOOD PRESSURE: 60 MMHG | HEIGHT: 67 IN

## 2022-08-31 DIAGNOSIS — Z79.4 TYPE 2 DIABETES MELLITUS WITH DIABETIC AUTONOMIC NEUROPATHY, WITH LONG-TERM CURRENT USE OF INSULIN (HCC): ICD-10-CM

## 2022-08-31 DIAGNOSIS — E11.43 TYPE 2 DIABETES MELLITUS WITH DIABETIC AUTONOMIC NEUROPATHY, WITH LONG-TERM CURRENT USE OF INSULIN (HCC): ICD-10-CM

## 2022-08-31 DIAGNOSIS — Z09 HOSPITAL DISCHARGE FOLLOW-UP: ICD-10-CM

## 2022-08-31 DIAGNOSIS — G47.14 SLEEP DISORDER DUE TO A GENERAL MEDICAL CONDITION, HYPERSOMNIA TYPE: ICD-10-CM

## 2022-08-31 DIAGNOSIS — E66.9 DIABETES MELLITUS TYPE 2 IN OBESE (HCC): Primary | ICD-10-CM

## 2022-08-31 DIAGNOSIS — Z23 NEED FOR SHINGLES VACCINE: ICD-10-CM

## 2022-08-31 DIAGNOSIS — G47.33 OSA (OBSTRUCTIVE SLEEP APNEA): ICD-10-CM

## 2022-08-31 DIAGNOSIS — I10 ESSENTIAL HYPERTENSION: ICD-10-CM

## 2022-08-31 DIAGNOSIS — E11.69 DIABETES MELLITUS TYPE 2 IN OBESE (HCC): Primary | ICD-10-CM

## 2022-08-31 DIAGNOSIS — E66.01 CLASS 2 SEVERE OBESITY DUE TO EXCESS CALORIES WITH SERIOUS COMORBIDITY AND BODY MASS INDEX (BMI) OF 38.0 TO 38.9 IN ADULT (HCC): ICD-10-CM

## 2022-08-31 LAB — HBA1C MFR BLD: 7.2 %

## 2022-08-31 PROCEDURE — 99212 OFFICE O/P EST SF 10 MIN: CPT | Performed by: STUDENT IN AN ORGANIZED HEALTH CARE EDUCATION/TRAINING PROGRAM

## 2022-08-31 PROCEDURE — 83036 HEMOGLOBIN GLYCOSYLATED A1C: CPT | Performed by: STUDENT IN AN ORGANIZED HEALTH CARE EDUCATION/TRAINING PROGRAM

## 2022-08-31 PROCEDURE — 1111F DSCHRG MED/CURRENT MED MERGE: CPT | Performed by: STUDENT IN AN ORGANIZED HEALTH CARE EDUCATION/TRAINING PROGRAM

## 2022-08-31 PROCEDURE — 99215 OFFICE O/P EST HI 40 MIN: CPT | Performed by: STUDENT IN AN ORGANIZED HEALTH CARE EDUCATION/TRAINING PROGRAM

## 2022-08-31 RX ORDER — ASPIRIN 81 MG/1
TABLET ORAL
Qty: 90 TABLET | Refills: 1 | Status: SHIPPED | OUTPATIENT
Start: 2022-08-31

## 2022-08-31 RX ORDER — BUMETANIDE 2 MG/1
2 TABLET ORAL DAILY
Qty: 30 TABLET | Refills: 3 | Status: SHIPPED | OUTPATIENT
Start: 2022-08-31

## 2022-08-31 NOTE — PATIENT INSTRUCTIONS
Dear Ev Rocha,        Thank you for coming to your appointment today. I hope we have addressed all of your needs. Please make sure to do the following:  - Continue your medications as listed. - Get labs drawn before our next follow up. - please continue to take balanced healthy diet, rich in fruits and vegetables, and low salt < 2 g/day. Please maintain a active lifestyle with regular exercise. - A referral was placed on your behalf during your visit for sleep study, you should expect to receive information about the date and time of your referral appointment within 7-10 days. If not, please call the nurse  line at [phone]  -Should you have further questions in regards to this visit, you can review your clinical note and after visit summary document on your [employer] account. Other questions can be directed to our nurse line at [phone]   - We will see each other again in 2 months      Call for a sooner appointment if you develop any acute concerns    Have a great day!         Sincerely,  Radha Tovar M.D PGY-2  8/31/2022  9:37 AM

## 2022-08-31 NOTE — PROGRESS NOTES
[facility]    Attending Physician Statement:  Kayden Rivera M.D., F.A.C.P. I have discussed the case, including pertinent history and exam findings with the resident. I agree with the assessment, plan and orders as documented by the resident. Patient is seen for fu visit today. Last office notes reviewed, relative labs and imaging. Health maintenance issues of vaccinations, depression screening, tobacco cessation etc... covered  Hospital fu:  \"Hospital Course:   On 8/22, Sebastián Abdul went to an internal medicine clinic appointment where he complained of increased shortness of breath, and he appeared fluid overloaded on exam.  He had a 20 pound weight gain over 2 weeks and 10 pound weight gain over the week prior. He was instructed to go to the emergency room for treatment of acute heart failure exacerbation. He was treated with 2 mg of IV Bumex. During admission he had adequate urine output with 5.8 L out during admission. His work of breathing, edema, and O2 requirement improved during his admission. He was continued on his home medication regimen which included 2 mg Bumex daily. He had an echo during admission with and EF of 65%, and this echo did not show any changes from his most recent echo in 9/2021. His medications were discussed with cardiology, and they do not want him to take mitolazone or aldactone.  He will be discharged with 2mg bumex daily with instructions to take 1 mg PRN for weight gain\"    Improved LE edema, no basilar crackles  Bmi 38  Hyperolmenence-- -not likey related to meds  High stop BANG-- -plan NIKO split study    On bumex, no K+-- check bp now  Repeat Bp pending, but BP high-- HR 99 on betablocker  BP (!) 166/69 (Site: Left Upper Arm, Position: Sitting, Cuff Size: Medium Adult)   Pulse 99   Temp 96.8 °F (36 °C) (Temporal)   Resp 18   Ht 5' 7\" (1.702 m)   Wt 245 lb (111.1 kg)   SpO2 92% Comment: room air  BMI 38.37 kg/m²     Dm2- jardiance +metformin +55lantus  +short 20tid  Aic 7.2 stable  (from 11 in June)  Much better  -- gabapentin helpful for neuropathy    Copd on inhalers  HTN and ?NIKO. /copd  Noted moderate pulm HTN on echo  Stage 2 diastolic ?how much L side heart  Echo:  \"Normal left ventricular chamber size. Normal left ventricular systolic function, EF 57%. Mild left ventricular concentric hypertrophy noted. Stage II diastolic dysfunction. \"    ?need to continue iron,  mcv 98-- wean iron /folate? Vitamin  Ferritin in march 124, folate 13.7  Doubt iron def,  cr. 1.2  7 days post DC, high complexity  Remainder of medical problems as per resident note.

## 2022-08-31 NOTE — PROGRESS NOTES
JOESPH obtained to get records for diabetic eye exam. Verbal discharge orders given to the patient per Dr. Marco A Becerra.

## 2022-08-31 NOTE — PROGRESS NOTES
Met with pr and wife after HFU appt. Discussed concerns regarding copays for medical services. LSW verified with [facility] that there is no copay at visit. LSW explained [employer] assistance process and documents which need to be completed and provided wife with multiple applications. Also spoke with Ranulfo Bell of PAP and pt needs $603 out of pocket pharmacy charges and as of 6.30.22 has $388; will obtain new printout. Verified with pt's pharmacy that 3o day copay for Eliquis is $85 and 90 day is $240; pt and wife confirm he has never used 30 day free Eliquis coupon which was provided and also suggested checking with cardiology for samples if unable to afford and not yet met out of pocket to qualify for PAP Eliquis    LSW called [facility] as per note on chart and spoke with Aaron Interiano and pt and wife on speaker phone. Pt chooses to continue to remain at IM office and advised; Aaron Interiano reports pt is empaneled to their office and non compliant and reports pt has had several visits, including recent INR and prescribed warfarin. Pt and wife unaware of this and both state he is not taking warfarin (we called pt's pharmacy and confirmed there is no active order for warfarin.   Pt and wife going to [facility] after leaving IM office to complete disenrollment with [facility]

## 2022-09-01 ENCOUNTER — HOSPITAL ENCOUNTER (OUTPATIENT)
Dept: OTHER | Age: 67
Setting detail: THERAPIES SERIES
Discharge: HOME | End: 2022-09-01
Payer: MEDICARE

## 2022-09-13 ENCOUNTER — HOSPITAL ENCOUNTER (OUTPATIENT)
Dept: OTHER | Age: 67
Setting detail: THERAPIES SERIES
Discharge: HOME | End: 2022-09-13
Payer: MEDICARE

## 2022-09-13 VITALS
RESPIRATION RATE: 18 BRPM | SYSTOLIC BLOOD PRESSURE: 127 MMHG | BODY MASS INDEX: 38.22 KG/M2 | OXYGEN SATURATION: 93 % | WEIGHT: 244 LBS | HEART RATE: 74 BPM | DIASTOLIC BLOOD PRESSURE: 67 MMHG

## 2022-09-13 LAB
ANION GAP SERPL CALCULATED.3IONS-SCNC: 15 MMOL/L (ref 7–16)
BUN BLDV-MCNC: 32 MG/DL (ref 6–23)
CALCIUM SERPL-MCNC: 8.3 MG/DL (ref 8.6–10.2)
CHLORIDE BLD-SCNC: 102 MMOL/L (ref 98–107)
CO2: 24 MMOL/L (ref 22–29)
CREAT SERPL-MCNC: 1.2 MG/DL (ref 0.7–1.2)
GFR AFRICAN AMERICAN: >60
GFR NON-AFRICAN AMERICAN: 60 ML/MIN/1.73
GLUCOSE BLD-MCNC: 158 MG/DL (ref 74–99)
MAGNESIUM: 1.4 MG/DL (ref 1.6–2.6)
POTASSIUM SERPL-SCNC: 3.8 MMOL/L (ref 3.5–5)
PRO-BNP: 1145 PG/ML (ref 0–125)
SODIUM BLD-SCNC: 141 MMOL/L (ref 132–146)

## 2022-09-13 PROCEDURE — 80048 BASIC METABOLIC PNL TOTAL CA: CPT

## 2022-09-13 PROCEDURE — 83735 ASSAY OF MAGNESIUM: CPT

## 2022-09-13 PROCEDURE — 36415 COLL VENOUS BLD VENIPUNCTURE: CPT

## 2022-09-13 PROCEDURE — 96375 TX/PRO/DX INJ NEW DRUG ADDON: CPT

## 2022-09-13 PROCEDURE — 99214 OFFICE O/P EST MOD 30 MIN: CPT

## 2022-09-13 PROCEDURE — 2580000003 HC RX 258: Performed by: STUDENT IN AN ORGANIZED HEALTH CARE EDUCATION/TRAINING PROGRAM

## 2022-09-13 PROCEDURE — 2500000003 HC RX 250 WO HCPCS: Performed by: STUDENT IN AN ORGANIZED HEALTH CARE EDUCATION/TRAINING PROGRAM

## 2022-09-13 PROCEDURE — 83880 ASSAY OF NATRIURETIC PEPTIDE: CPT

## 2022-09-13 RX ORDER — SODIUM CHLORIDE 0.9 % (FLUSH) 0.9 %
10 SYRINGE (ML) INJECTION 2 TIMES DAILY
Status: DISCONTINUED | OUTPATIENT
Start: 2022-09-13 | End: 2022-09-14 | Stop reason: HOSPADM

## 2022-09-13 RX ORDER — BUMETANIDE 0.25 MG/ML
2 INJECTION, SOLUTION INTRAMUSCULAR; INTRAVENOUS ONCE
Status: COMPLETED | OUTPATIENT
Start: 2022-09-13 | End: 2022-09-13

## 2022-09-13 RX ADMIN — BUMETANIDE 2 MG: 0.25 INJECTION INTRAMUSCULAR; INTRAVENOUS at 10:39

## 2022-09-13 RX ADMIN — SODIUM CHLORIDE, PRESERVATIVE FREE 10 ML: 5 INJECTION INTRAVENOUS at 10:40

## 2022-09-13 NOTE — PROGRESS NOTES
Congestive Heart Failure [address]         Candy Ulloa   1955          Referring Provider: Edgardo Alexis  Primary Care Physician: [address]  Cardiologist: NOT ESTABLISHED YET/ EP DR. Miles Schlatter  Nephrologist:  [address] [facility]        History of Present Illness:     Candy Ulloa is a 69 y.o. male with a history of HFpEF, most recent EF 55-60%. Patient Story:    He does  have dyspnea with exertion, shortness of breath, or decline in overall functional capacity. He does have orthopnea, PND, nocturnal cough or hemoptysis. He does have abdominal distention or bloating, early satiety, anorexia/change in appetite. He does has a good urinary response to  oral diuretic. He does have  lower extremity edema. He denies lightheadedness, dizziness. He denies palpitations, syncope or near syncope. He does not complain of chest pain, pressure, discomfort. No Known Allergies        Prior to Visit Medications    Medication Sig Taking? Authorizing Provider   bumetanide (BUMEX) 2 MG tablet Take 1 tablet by mouth daily  Stephen Simon MD   metFORMIN (GLUCOPHAGE) 500 MG tablet Take 1 tablet by mouth 2 times daily (with meals)  Stephen Simon MD   aspirin (ASPIR-LOW) 81 MG EC tablet TAKE ONE TABLET BY MOUTH EVERY DAY  Stephen Simon MD   zoster recombinant adjuvanted vaccine [facility]) 50 MCG/0.5ML SUSR injection 1 dose now and repeat in 2-6 months  Stephen Simon MD   acetaminophen-codeine (TYLENOL #3) 300-30 MG per tablet TAKE ONE TABLET BY MOUTH EVERY 6 HOURS AS NEEDED FOR PAIN  Historical Provider, MD   gabapentin (NEURONTIN) 300 MG capsule TAKE ONE CAPSULE BY MOUTH TWO TIMES A DAY  Historical Provider, MD   ELIQUIS 5 MG TABS tablet Take 1 tablet by mouth in the morning and 1 tablet before bedtime.   Rikkiarslan Cleary MD   atorvastatin (LIPITOR) 40 MG tablet TAKE ONE TABLET BY MOUTH DAILY  Rikki Cleary MD   metoprolol succinate (TOPROL XL) 25 MG extended release tablet Take 1 tablet by mouth in the morning. Pooja Ford MD   levothyroxine (SYNTHROID) 88 MCG tablet Take 1 tablet by mouth in the morning. Pooja Ford MD   mometasone-formoterol [facility]) 200-5 MCG/ACT inhaler Inhale 2 puffs into the lungs in the morning and 2 puffs before bedtime. Rinse mouth after using. PAP medication. Jayjay Rodriguez MD   insulin lispro, 1 Unit Dial, (HUMALOG KWIKPEN) 100 UNIT/ML SOPN Inject 20 Units into the skin in the morning and 20 Units at noon and 20 Units in the evening. Inject before meals. Do all this for 225 doses. Pooja Ford MD   insulin glargine (LANTUS SOLOSTAR) 100 UNIT/ML injection pen Inject 55 Units into the skin nightly  Kulwant Huggins MD   Insulin Pen Needle (PEN NEEDLES) 31G X 6 MM MISC Once daily. May substitute brand for insurance coverage. Kulwant Huggins MD   empagliflozin (JARDIANCE) 25 MG tablet Take 1 tablet by mouth daily  Jayme Bowles, DO   Ferrous Gluconate-C-Folic Acid (IRON-C PO) Take by mouth  Historical Provider, MD   Insulin Pen Needle (PEN NEEDLES) 32G X 6 MM MISC Take insulin daily  Sarah Shirley MD   Lancets MISC Give Delica lancets. Tests twice a day A.M. And P.M  Sarah Shirley MD   blood glucose test strips (ONETOUCH VERIO) strip TEST IN THE MORNING AND IN THE EVENING  Sarah Shirley MD   Misc. Devices MISC Please add portability to current O2 order. Resp to evaluate patient for OCD device. Javier Nettles MD   Misc. Devices KIT Dispense 1 blood pressure cuff. Patient not taking: Reported on 8/31/2022  Mary Stewart MD   OXYGEN Inhale 2.5 L into the lungs nightly  Patient taking differently: Inhale 2.5 L into the lungs nightly Spouse states uses  5 L/min via nc bedtime  Bari Newberry MD   acetaminophen (TYLENOL) 500 MG tablet Take 1,000 mg by mouth daily as needed for Pain  Historical Provider, MD           Guideline directed medical:  ARNI/ACE I/ARB: No  Beta blocker:   Yes  Aldosterone antagonist:  No  JARDIANCE      Physical Examination:     /67   Pulse 74   Resp 18   Wt 244 lb (110.7 kg)   SpO2 93%   BMI 38.22 kg/m²                    HEENT (Head, Ears, Eyes, Nose, & Throat)  HEENT (WDL): Within Defined Limits    Respiratory  Respiratory Pattern: Regular  Respiratory Depth: Normal  Respiratory Quality/Effort: Dyspnea with exertion  Chest Assessment: Chest expansion symmetrical  L Breath Sounds: Diminished, Fine Crackles  R Breath Sounds: Fine Crackles, Diminished              Cardiac  Cardiac Rhythm: Ventricular paced    Rhythm Interpretation  Heart Rate: 74         Gastrointestinal  Abdominal (WDL): Exceptions to WDL  Abdomen Inspection: Soft, Rotund, Distended  RUQ Bowel Sounds: Active  LUQ Bowel Sounds: Active  RLQ Bowel Sounds: Active  LLQ Bowel Sounds: Active          Bowel Sounds  RUQ Bowel Sounds: Active  LUQ Bowel Sounds: Active  RLQ Bowel Sounds: Active  LLQ Bowel Sounds:  Active    Peripheral Vascular  Peripheral Vascular (WDL): Exceptions to WDL  Edema: Right lower extremity, Left lower extremity  RLE Edema: Pitting, +1  LLE Edema: Pitting, +1                                                 Heart Rate: 74                     LAB DATA:    Last 3 BMP      Sodium (mmol/L)   Date Value   08/23/2022 140   08/22/2022 139   08/09/2022 138     Potassium (mmol/L)   Date Value   08/09/2022 4.0   07/22/2022 3.6   07/13/2022 4.2     Potassium reflex Magnesium (mmol/L)   Date Value   08/23/2022 4.3   08/22/2022 4.4   03/10/2022 5.0     Chloride (mmol/L)   Date Value   08/23/2022 105   08/22/2022 104   08/09/2022 98     CO2 (mmol/L)   Date Value   08/23/2022 23   08/22/2022 24   08/09/2022 26     BUN (mg/dL)   Date Value   08/23/2022 28 (H)   08/22/2022 29 (H)   08/09/2022 33 (H)     Glucose (mg/dL)   Date Value   08/23/2022 114 (H)   08/22/2022 158 (H)   08/09/2022 234 (H)   04/18/2012 133 (H)   10/25/2011 138 (H)   01/28/2011 162 (H)     Calcium (mg/dL)   Date Value   08/23/2022 9.2   08/22/2022 9.1   08/09/2022 8.7       Last 3 BNP       Pro-BNP (pg/mL)   Date Value   08/22/2022 2,079 (H)   08/09/2022 1,936 (H)   07/22/2022 1,464 (H)          CBC: No results for input(s): WBC, HGB, PLT in the last 72 hours. BMP:    No results for input(s): NA, K, CL, CO2, BUN, CREATININE, GLUCOSE in the last 72 hours. Hepatic: No results for input(s): AST, ALT, ALB, BILITOT, ALKPHOS in the last 72 hours. Troponin: No results for input(s): TROPONINI in the last 72 hours. BNP: No results for input(s): BNP in the last 72 hours. Lipids: No results for input(s): CHOL, HDL in the last 72 hours. Invalid input(s): LDLCALCU  INR: No results for input(s): INR in the last 72 hours. WEIGHTS:    Wt Readings from Last 3 Encounters:   09/13/22 244 lb (110.7 kg)   08/31/22 245 lb (111.1 kg)   08/23/22 246 lb (111.6 kg)         TELEMETRY:  Cardiac Regularity: Regular  Cardiac Rhythm/Interpretation: VPACED        ASSESSMENT:  Luis Rocha   HAS 5LB WEIGHT GAIN SINCE LAST APPT ON 8/9/22. PT ADMITS TO EATING OUT FREQUENTLY AT [employer]  [employer]. RE INSTRUCTED PT ON LOW SODIUM DIET AND IMPORTANCE OF FOLLOWING SAME. GAVE PT. FOOD OPTIONS, PT VERBALIZED UNDERSTANDING  Interventions completed this visit:  IV diuretics given YES Bumex 2MG iv  Lab work obtained yes, YES BMP/BNP/MG  Reviewed currently prescribed medications with patient, educated on importance of compliance and answered any questions regarding their medication  Educated on signs and symptoms of HF  Educated on low sodium diet    PLAN:  Scheduled to follow up in CHF clinic on   Future Appointments   Date Time Provider Stacie Mtz   9/19/2022  1:00 PM Tracey Chua MD [facility]   9/21/2022  9:45 AM [facility] ROOM 1 [facility]   11/7/2022 10:00 PM [facility] SLE[facility] LAB BEDROOM 6 [facility]   11/16/2022  8:30 CARITO Medina MD [facility]     Given clinic phone number and aware of signs and symptoms to call with any HF change in symptoms.

## 2022-09-15 ENCOUNTER — CARE COORDINATION (OUTPATIENT)
Dept: CARE COORDINATION | Age: 67
End: 2022-09-15

## 2022-09-15 NOTE — LETTER
9/16/2022      [address]  [facility] [address]    Dear Mary Baron and Joshua Mcbride,    I enjoyed speaking with you and wanted to send some additional information. Carlin Milan MD and I will work together to ensure your needs are met and help you achieve your health goals. We are committed to walk with you on this journey and look forward to working with you. Please feel free to contact me with any questions or concerns. You can reach me at [phone].       In good health,     Reji Lamas RN, [facility]  Ambulatory Care Manager      Enclosed: CHF/COPD/DM zones, daily BS log, daily weight log

## 2022-09-16 NOTE — CARE COORDINATION
work, caregiving, access to transportation or other): No identified problems or perceived positive benefits   How would you rate their social network (family, work, friends)?: Good participation with social networks   How would you rate their financial resources (including ability to afford all required medical care)?: Financially secure, some resource challenges   How wells does the patient now understand their health and well-being (symptoms, signs or risk factors) and what they need to do to manage their health?: Reasonable to good understanding but do not feel able to engage with advice at this time   How well do you think your patient can engage in healthcare discussions? (Barriers include language, deafness, aphasia, alcohol or drug problems, learning difficulties, concentration): Clear and open communication, no identified barriers   Do other services need to be involved to help this patient?: Other care/services in place and adequate   Are current services involved with this patient well-coordinated? (Include coordination with other services you are now recommendation): Required care/services in place and adequately coordinated   Suggested Interventions and Community Resources  Fall Risk Prevention: In Process Disease Specific Clinic: Completed (Comment: CHF)   Medication Assistance Program: Completed   Social Work: Completed   Zone Management Tools: In Process         Set up/Review an Education Plan, Set up/Review Goals                Prior to Admission medications    Medication Sig Start Date End Date Taking?  Authorizing Provider   bumetanide (BUMEX) 2 MG tablet Take 1 tablet by mouth daily 8/31/22  Yes Miguel Angel Noe MD   metFORMIN (GLUCOPHAGE) 500 MG tablet Take 1 tablet by mouth 2 times daily (with meals) 8/31/22  Yes Miguel Angel Noe MD   aspirin (ASPIR-LOW) 81 MG EC tablet TAKE ONE TABLET BY MOUTH EVERY DAY 8/31/22  Yes Miguel Angel Noe MD   gabapentin (NEURONTIN) 300 MG capsule TAKE ONE CAPSULE BY MOUTH TWO TIMES A DAY 8/12/22  Yes Historical Provider, MD   ELIQUIS 5 MG TABS tablet Take 1 tablet by mouth in the morning and 1 tablet before bedtime. 7/18/22  Yes Rikki Cleary MD   atorvastatin (LIPITOR) 40 MG tablet TAKE ONE TABLET BY MOUTH DAILY 7/18/22  Yes Rikki Cleary MD   metoprolol succinate (TOPROL XL) 25 MG extended release tablet Take 1 tablet by mouth in the morning. 7/18/22  Yes Rikki Cleary MD   levothyroxine (SYNTHROID) 88 MCG tablet Take 1 tablet by mouth in the morning. 7/18/22 1/14/23 Yes Rikki Cleary MD   mometasone-formoterol [facility]) 200-5 MCG/ACT inhaler Inhale 2 puffs into the lungs in the morning and 2 puffs before bedtime. Rinse mouth after using. PAP medication. . 7/18/22  Yes Rikki Cleary MD   insulin lispro, 1 Unit Dial, (HUMALOG KWIKPEN) 100 UNIT/ML SOPN Inject 20 Units into the skin in the morning and 20 Units at noon and 20 Units in the evening. Inject before meals. Do all this for 225 doses. 7/18/22 10/1/22 Yes Rikki Cleary MD   insulin glargine (LANTUS SOLOSTAR) 100 UNIT/ML injection pen Inject 55 Units into the skin nightly 7/13/22  Yes Roselia Reno MD   empagliflozin (JARDIANCE) 25 MG tablet Take 1 tablet by mouth daily 6/23/22 6/23/23 Yes Emil Sewell, DO   Ferrous Gluconate-C-Folic Acid (IRON-C PO) Take by mouth   Yes Historical Provider, MD   acetaminophen (TYLENOL) 500 MG tablet Take 1,000 mg by mouth daily as needed for Pain   Yes Historical Provider, MD   zoster recombinant adjuvanted vaccine [facility]) 50 MCG/0.5ML SUSR injection 1 dose now and repeat in 2-6 months 8/31/22   Stephen Simon MD   acetaminophen-codeine (TYLENOL #3) 300-30 MG per tablet TAKE ONE TABLET BY MOUTH EVERY 6 HOURS AS NEEDED FOR PAIN  Patient not taking: Reported on 9/16/2022 7/18/22   Historical Provider, MD   Insulin Pen Needle (PEN NEEDLES) 31G X 6 MM MISC Once daily. May substitute brand for insurance coverage.  7/13/22   Roselia Reno MD   Insulin Pen Needle (PEN NEEDLES) 32G X 6 MM MISC Take insulin daily 2/28/22   Estephanie Hardin MD   Lancets MISC Give Delica lancets. Tests twice a day A.M. And P.M 2/28/22   Estephanie Hardin MD   blood glucose test strips (ONETOUCH VERIO) strip TEST IN THE MORNING AND IN THE EVENING 2/28/22   Estephanie Hardin MD   Misc. Devices MISC Please add portability to current O2 order. Resp to evaluate patient for OCD device. 9/15/21   Sophia Solorio MD   Misc. Devices KIT Dispense 1 blood pressure cuff.   Patient not taking: Reported on 8/31/2022 2/23/21   Starla Kussmaul, MD   OXYGEN Inhale 2.5 L into the lungs nightly  Patient taking differently: Inhale 2.5 L into the lungs nightly Spouse states uses  5 L/min via nc bedtime 7/28/20   Glenda Butcher MD       Future Appointments   Date Time Provider Stacie Mtz   9/19/2022  1:00 PM Arcelia Wood MD [facility]   9/21/2022  9:45 AM [facility] ROOM 1 [facility]   11/7/2022 10:00 PM [facility] SLEEP LAB BEDROOM 6 [facility]   11/16/2022  8:30 AM [facility]     ,   Diabetes Assessment    Medic Alert ID: No  How often do you test your blood sugar?: Daily            ,   Congestive Heart Failure Assessment    Are you currently restricting fluids?: No Restriction  Do you understand a low sodium diet?: Yes  Do you understand how to read food labels?: Yes         Symptoms:          ,   COPD Assessment    Does the patient understand envrionmental exposure?: Yes  Is the patient able to verbalize Rescue vs. Long Acting medications?: Yes  Does the patient have a nebulizer?: No  Does the patient use a space with inhaled medications?: No            Symptoms:          , and   General Assessment    Do you have any symptoms that are causing concern?: No

## 2022-09-19 ENCOUNTER — OFFICE VISIT (OUTPATIENT)
Dept: SURGERY | Age: 67
End: 2022-09-19
Payer: MEDICARE

## 2022-09-19 VITALS
HEART RATE: 78 BPM | OXYGEN SATURATION: 98 % | DIASTOLIC BLOOD PRESSURE: 64 MMHG | SYSTOLIC BLOOD PRESSURE: 133 MMHG | BODY MASS INDEX: 38.61 KG/M2 | WEIGHT: 246 LBS | HEIGHT: 67 IN | RESPIRATION RATE: 16 BRPM

## 2022-09-19 DIAGNOSIS — D49.2 SOFT TISSUE NEOPLASM: Primary | ICD-10-CM

## 2022-09-19 PROCEDURE — 1123F ACP DISCUSS/DSCN MKR DOCD: CPT | Performed by: SURGERY

## 2022-09-19 PROCEDURE — 99212 OFFICE O/P EST SF 10 MIN: CPT | Performed by: SURGERY

## 2022-09-19 PROCEDURE — 99213 OFFICE O/P EST LOW 20 MIN: CPT | Performed by: SURGERY

## 2022-09-19 ASSESSMENT — ENCOUNTER SYMPTOMS
ABDOMINAL PAIN: 0
COLOR CHANGE: 0
BACK PAIN: 1
ANAL BLEEDING: 0
CHEST TIGHTNESS: 0
EYES NEGATIVE: 1
BLOOD IN STOOL: 0
NAUSEA: 0
CONSTIPATION: 0
VOMITING: 0
DIARRHEA: 0
CHOKING: 0
SHORTNESS OF BREATH: 1
COUGH: 1
ABDOMINAL DISTENTION: 1
WHEEZING: 1

## 2022-09-19 NOTE — LETTER
[facility]  [address] [place] [id number]  Phone: [phone]  Fax: [phone]           Girish Hernadez MD      September 19, 2022     Patient: Radha Donis   MR Number: [id number]   YOB: 1955   Date of Visit: 9/19/2022       Dear Dr. Bhumi Barbour: Thank you for referring Hugo Whitaker to me for evaluation/treatment. Below are the relevant portions of my assessment and plan of care. Soft tissue neoplasm upper back    Recommend excision. The patient was explained the risks/benefits/alternatives/expected outcomes of the procedure. The patient was explained the risks, including, but not limited to, bleeding, infection, reaction to the anesthesia medicine, and recurrence. All questions were answered. The patient verbalized understanding and agreed to proceed. Pt is at higher risk due to Eliquis use and need to hold. If you have questions, please do not hesitate to call me. I look forward to following Chris Toscano along with you.     Sincerely,        Girish Hernadez MD     providers:  JACQUELINE Em - [facility]  [address] [facility] [address]  Via Fax: [phone]

## 2022-09-19 NOTE — PROGRESS NOTES
Subjective:      Patient ID: Radha Donis is a 69 y.o. male. HPI  69 yr old male here with complaint of cyst on back. Pt states has been present for about a year. States he had area lanced about 10 years ago. Pt reports the area is increasing in size. Denies pain in area. Denies drainage from area. Denies fevers/chills.     Past Medical History:   Diagnosis Date    RADHA (acute kidney injury) ([facility] 3/10/2022    Atrial fibrillation ([facility]     Carpal tunnel syndrome     Encounter regarding vascular access for dialysis for ESRD ([facility] 3/12/2022    Hyperlipidemia     Hypertension     Hypothyroidism     Lung nodule     Nocturnal hypoxemia due to obesity 8/8/2013    Obesity     NIKO (obstructive sleep apnea) 8/8/2013    [facility]    Osteoarthritis     Pinched nerve     Lumbar    Restrictive lung disease secondary to obesity 7/25/2016    Sinus node dysfunction (HCC)     Type II or unspecified type diabetes mellitus without mention of complication, not stated as uncontrolled        Past Surgical History:   Procedure Laterality Date    BACK SURGERY  2012    [facility] [place]. [place] nerve in back    BACK SURGERY  05/30/2017    COLONOSCOPY  2007    multiple colon polyps    COLONOSCOPY  06/04/2012    COLONOSCOPY    COLONOSCOPY  04/26/2022    polyp; diverticula--sarah    COLONOSCOPY N/A 4/26/2022    COLONOSCOPY POLYPECTOMY HOT BIOPSY (Snare) performed by Girish Hernadez MD at [address] [facility]      lennie cataracts implant    HERNIA REPAIR      umbilical    PACEMAKER PLACEMENT  10/03/2017    [employer] dual chamber    Dr. Frank Cyr Right        Current Outpatient Medications   Medication Sig Dispense Refill    bumetanide (BUMEX) 2 MG tablet Take 1 tablet by mouth daily 30 tablet 3    metFORMIN (GLUCOPHAGE) 500 MG tablet Take 1 tablet by mouth 2 times daily (with meals) 60 tablet 5    aspirin (ASPIR-LOW) 81 MG EC tablet TAKE ONE TABLET BY MOUTH EVERY DAY 90 tablet 1    zoster recombinant adjuvanted vaccine (SHINGRIX) 50 MCG/0.5ML SUSR injection 1 dose now and repeat in 2-6 months 0.5 mL 0    gabapentin (NEURONTIN) 300 MG capsule TAKE ONE CAPSULE BY MOUTH TWO TIMES A DAY      ELIQUIS 5 MG TABS tablet Take 1 tablet by mouth in the morning and 1 tablet before bedtime. 180 tablet 1    atorvastatin (LIPITOR) 40 MG tablet TAKE ONE TABLET BY MOUTH DAILY 90 tablet 1    metoprolol succinate (TOPROL XL) 25 MG extended release tablet Take 1 tablet by mouth in the morning. 90 tablet 1    levothyroxine (SYNTHROID) 88 MCG tablet Take 1 tablet by mouth in the morning. 90 tablet 1    mometasone-formoterol (DULERA) 200-5 MCG/ACT inhaler Inhale 2 puffs into the lungs in the morning and 2 puffs before bedtime. Rinse mouth after using. PAP medication. . 3 each 2    insulin lispro, 1 Unit Dial, (HUMALOG KWIKPEN) 100 UNIT/ML SOPN Inject 20 Units into the skin in the morning and 20 Units at noon and 20 Units in the evening. Inject before meals. Do all this for 225 doses. 27 mL 1    insulin glargine (LANTUS SOLOSTAR) 100 UNIT/ML injection pen Inject 55 Units into the skin nightly 15 pen 3    Insulin Pen Needle (PEN NEEDLES) 31G X 6 MM MISC Once daily. May substitute brand for insurance coverage. 100 each 3    empagliflozin (JARDIANCE) 25 MG tablet Take 1 tablet by mouth daily 90 tablet 3    Ferrous Gluconate-C-Folic Acid (IRON-C PO) Take by mouth      Insulin Pen Needle (PEN NEEDLES) 32G X 6 MM MISC Take insulin daily 100 each 1    Lancets MISC Give Delica lancets. Tests twice a day A.M. And P.M 200 each 1    blood glucose test strips (ONETOUCH VERIO) strip TEST IN THE MORNING AND IN THE EVENING 200 each 1    Misc. Devices MISC Please add portability to current O2 order. Resp to evaluate patient for OCD device.  1 each 0    OXYGEN Inhale 2.5 L into the lungs nightly (Patient taking differently: Inhale 2.5 L into the lungs nightly Spouse states uses  5 L/min via nc bedtime) 1 Device 99    acetaminophen (TYLENOL) 500 MG tablet Take 1,000 mg by mouth daily as needed for Pain      acetaminophen-codeine (TYLENOL #3) 300-30 MG per tablet TAKE ONE TABLET BY MOUTH EVERY 6 HOURS AS NEEDED FOR PAIN (Patient not taking: No sig reported)      Misc. Devices KIT Dispense 1 blood pressure cuff. (Patient not taking: No sig reported) 1 kit 0     No current facility-administered medications for this visit. No Known Allergies    Family History   Problem Relation Age of Onset    Cancer Mother         skin    Heart Disease Mother     High Blood Pressure Father     Amyotrophic lateral sclerosis Father     Cancer Brother     High Blood Pressure Brother     Diabetes Brother        Social History     Socioeconomic History    Marital status:      Spouse name: Not on file    Number of children: Not on file    Years of education: Not on file    Highest education level: Not on file   Occupational History    Not on file   Tobacco Use    Smoking status: Former     Packs/day: 0.10     Years: 35.00     Pack years: 3.50     Types: Cigarettes     Quit date: 2004     Years since quittin.8    Smokeless tobacco: Former     Quit date:    Vaping Use    Vaping Use: Never used   Substance and Sexual Activity    Alcohol use: No     Alcohol/week: 0.0 standard drinks    Drug use: No    Sexual activity: Yes     Partners: Female   Other Topics Concern    Not on file   Social History Narrative    Not on file     Social Determinants of Health     Financial Resource Strain: High Risk    Difficulty of Paying Living Expenses: Very hard   Food Insecurity: No Food Insecurity    Worried About Running Out of Food in the Last Year: Never true    Blaise of Food in the Last Year: Never true   Transportation Needs: No Transportation Needs    Lack of Transportation (Medical): No    Lack of Transportation (Non-Medical):  No   Physical Activity: Inactive    Days of Exercise per Week: 0 days  Minutes of Exercise per Session: 0 min   Stress: Not on file   Social Connections: Not on file   Intimate Partner Violence: Not on file   Housing Stability: Low Risk     Unable to Pay for Housing in the Last Year: No    Number of Places Lived in the Last Year: 1    Unstable Housing in the Last Year: No     Review of Systems   Constitutional:  Negative for activity change, appetite change, chills, fever and unexpected weight change. HENT: Negative. Phlegm build up in throat   Eyes: Negative. Respiratory:  Positive for cough, shortness of breath and wheezing. Negative for choking and chest tightness. Cardiovascular:  Positive for leg swelling. Negative for chest pain and palpitations. Gastrointestinal:  Positive for abdominal distention. Negative for abdominal pain, anal bleeding, blood in stool, constipation, diarrhea, nausea and vomiting. Endocrine: Positive for polydipsia and polyuria. Negative for cold intolerance and heat intolerance. Genitourinary:  Positive for dysuria, frequency and urgency. Negative for hematuria, penile discharge, penile pain, penile swelling, scrotal swelling and testicular pain. Musculoskeletal:  Positive for back pain, gait problem, joint swelling, neck pain and neck stiffness. Negative for arthralgias and myalgias. Skin:  Negative for color change, pallor, rash and wound. Allergic/Immunologic: Negative for environmental allergies and food allergies. Neurological:  Negative for dizziness, seizures, syncope, weakness, light-headedness, numbness and headaches. Hematological:  Negative for adenopathy. Bruises/bleeds easily. Psychiatric/Behavioral:  Negative for agitation, confusion, decreased concentration, hallucinations, self-injury and suicidal ideas. The patient is not nervous/anxious and is not hyperactive. Objective:   Physical Exam  Constitutional:       General: He is not in acute distress. Appearance: Normal appearance.  He is well-developed. He is not ill-appearing, toxic-appearing or diaphoretic. HENT:      Head: Normocephalic and atraumatic. Nose: Nose normal.      Mouth/Throat:      Mouth: Mucous membranes are moist.      Pharynx: Oropharynx is clear. Eyes:      General: No scleral icterus. Right eye: No discharge. Left eye: No discharge. Extraocular Movements: Extraocular movements intact. Pupils: Pupils are equal, round, and reactive to light. Cardiovascular:      Rate and Rhythm: Normal rate. Rhythm irregular. Heart sounds: Normal heart sounds. No murmur heard. Pulmonary:      Effort: Pulmonary effort is normal. No respiratory distress. Breath sounds: Normal breath sounds. No stridor. No wheezing, rhonchi or rales. Abdominal:      General: Bowel sounds are normal. There is no distension. Palpations: Abdomen is soft. There is no mass. Tenderness: There is no abdominal tenderness. There is no guarding or rebound. Hernia: No hernia is present. Musculoskeletal:         General: Normal range of motion. Cervical back: Normal range of motion and neck supple. Skin:     General: Skin is warm and dry. Neurological:      General: No focal deficit present. Mental Status: He is alert and oriented to person, place, and time. Psychiatric:         Mood and Affect: Mood normal.         Behavior: Behavior normal.         Thought Content: Thought content normal.         Judgment: Judgment normal.       Assessment:      Soft tissue neoplasm upper back      Plan:      Recommend excision. The patient was explained the risks/benefits/alternatives/expected outcomes of the procedure. The patient was explained the risks, including, but not limited to, bleeding, infection, reaction to the anesthesia medicine, and recurrence. All questions were answered. The patient verbalized understanding and agreed to proceed.   Pt is at higher risk due to Eliquis use and need to hold.        Adam Virk MD

## 2022-09-20 ENCOUNTER — TELEPHONE (OUTPATIENT)
Dept: SURGERY | Age: 67
End: 2022-09-20

## 2022-09-21 ENCOUNTER — HOSPITAL ENCOUNTER (OUTPATIENT)
Dept: OTHER | Age: 67
Setting detail: THERAPIES SERIES
Discharge: HOME | End: 2022-09-21
Payer: MEDICARE

## 2022-09-21 VITALS
RESPIRATION RATE: 18 BRPM | DIASTOLIC BLOOD PRESSURE: 75 MMHG | WEIGHT: 247 LBS | BODY MASS INDEX: 38.69 KG/M2 | SYSTOLIC BLOOD PRESSURE: 133 MMHG | HEART RATE: 72 BPM | OXYGEN SATURATION: 93 %

## 2022-09-21 LAB
ANION GAP SERPL CALCULATED.3IONS-SCNC: 14 MMOL/L (ref 7–16)
BUN BLDV-MCNC: 38 MG/DL (ref 6–23)
CALCIUM SERPL-MCNC: 9 MG/DL (ref 8.6–10.2)
CHLORIDE BLD-SCNC: 97 MMOL/L (ref 98–107)
CO2: 27 MMOL/L (ref 22–29)
CREAT SERPL-MCNC: 1.3 MG/DL (ref 0.7–1.2)
GFR AFRICAN AMERICAN: >60
GFR NON-AFRICAN AMERICAN: 55 ML/MIN/1.73
GLUCOSE BLD-MCNC: 214 MG/DL (ref 74–99)
POTASSIUM SERPL-SCNC: 4.1 MMOL/L (ref 3.5–5)
PRO-BNP: 1444 PG/ML (ref 0–125)
SODIUM BLD-SCNC: 138 MMOL/L (ref 132–146)

## 2022-09-21 PROCEDURE — 2500000003 HC RX 250 WO HCPCS: Performed by: INTERNAL MEDICINE

## 2022-09-21 PROCEDURE — 80048 BASIC METABOLIC PNL TOTAL CA: CPT

## 2022-09-21 PROCEDURE — 36415 COLL VENOUS BLD VENIPUNCTURE: CPT

## 2022-09-21 PROCEDURE — 2580000003 HC RX 258: Performed by: INTERNAL MEDICINE

## 2022-09-21 PROCEDURE — 99214 OFFICE O/P EST MOD 30 MIN: CPT

## 2022-09-21 PROCEDURE — 83880 ASSAY OF NATRIURETIC PEPTIDE: CPT

## 2022-09-21 PROCEDURE — 96374 THER/PROPH/DIAG INJ IV PUSH: CPT

## 2022-09-21 RX ORDER — SODIUM CHLORIDE 0.9 % (FLUSH) 0.9 %
10 SYRINGE (ML) INJECTION 2 TIMES DAILY
Status: DISCONTINUED | OUTPATIENT
Start: 2022-09-21 | End: 2022-09-22 | Stop reason: HOSPADM

## 2022-09-21 RX ORDER — BUMETANIDE 0.25 MG/ML
2 INJECTION, SOLUTION INTRAMUSCULAR; INTRAVENOUS ONCE
Status: COMPLETED | OUTPATIENT
Start: 2022-09-21 | End: 2022-09-21

## 2022-09-21 RX ADMIN — SODIUM CHLORIDE, PRESERVATIVE FREE 10 ML: 5 INJECTION INTRAVENOUS at 09:54

## 2022-09-21 RX ADMIN — BUMETANIDE 2 MG: 0.25 INJECTION, SOLUTION INTRAMUSCULAR; INTRAVENOUS at 09:54

## 2022-09-21 NOTE — PROGRESS NOTES
Congestive Heart Failure [address]         Tawana Valdes   1955          Referring Provider: Edgardo Alexis  Primary Care Physician: [address]  Cardiologist: NOT ESTABLISHED YET/ EP DR. Zion Marie  Nephrologist:  [id number]        History of Present Illness:     Tawana Valdes is a 69 y.o. male with a history of HFpEF, most recent EF 55-60%. Patient Story:    He does  have dyspnea with exertion, shortness of breath, or decline in overall functional capacity. He does have orthopnea, PND, nocturnal cough or hemoptysis. He does have abdominal distention or bloating, early satiety, anorexia/change in appetite. He does has a good urinary response to  oral diuretic. He does have  lower extremity edema. He denies lightheadedness, dizziness. He denies palpitations, syncope or near syncope. He does not complain of chest pain, pressure, discomfort. No Known Allergies        Prior to Visit Medications    Medication Sig Taking? Authorizing Provider   bumetanide (BUMEX) 2 MG tablet Take 1 tablet by mouth daily  Bandar Miranda MD   metFORMIN (GLUCOPHAGE) 500 MG tablet Take 1 tablet by mouth 2 times daily (with meals)  Bandar Miranda MD   aspirin (ASPIR-LOW) 81 MG EC tablet TAKE ONE TABLET BY MOUTH EVERY DAY  Bandar Miranda MD   zoster recombinant adjuvanted vaccine [facility]) 50 MCG/0.5ML SUSR injection 1 dose now and repeat in 2-6 months  Bandar Miranda MD   acetaminophen-codeine (TYLENOL #3) 300-30 MG per tablet TAKE ONE TABLET BY MOUTH EVERY 6 HOURS AS NEEDED FOR PAIN  Patient not taking: No sig reported  Historical Provider, MD   gabapentin (NEURONTIN) 300 MG capsule TAKE ONE CAPSULE BY MOUTH TWO TIMES A DAY  Historical Provider, MD   ELIQUIS 5 MG TABS tablet Take 1 tablet by mouth in the morning and 1 tablet before bedtime.   Harper Lopez MD   atorvastatin (LIPITOR) 40 MG tablet TAKE ONE TABLET BY MOUTH DAILY  Harper Lopez MD   metoprolol succinate (TOPROL XL) 25 MG extended release tablet Take 1 tablet by mouth in the morning. Per Esquivel MD   levothyroxine (SYNTHROID) 88 MCG tablet Take 1 tablet by mouth in the morning. Per Esquivel MD   mometasone-formoterol [facility]) 200-5 MCG/ACT inhaler Inhale 2 puffs into the lungs in the morning and 2 puffs before bedtime. Rinse mouth after using. PAP medication. Clair Bamberger, MD   insulin lispro, 1 Unit Dial, (HUMALOG KWIKPEN) 100 UNIT/ML SOPN Inject 20 Units into the skin in the morning and 20 Units at noon and 20 Units in the evening. Inject before meals. Do all this for 225 doses. Per Esquivel MD   insulin glargine (LANTUS SOLOSTAR) 100 UNIT/ML injection pen Inject 55 Units into the skin nightly  Patient not taking: Reported on 9/21/2022  Doris Donovan MD   Insulin Pen Needle (PEN NEEDLES) 31G X 6 MM MISC Once daily. May substitute brand for insurance coverage. Doris Donovan MD   empagliflozin (JARDIANCE) 25 MG tablet Take 1 tablet by mouth daily  Roselia Bumps, DO   Ferrous Gluconate-C-Folic Acid (IRON-C PO) Take by mouth  Historical Provider, MD   Insulin Pen Needle (PEN NEEDLES) 32G X 6 MM MISC Take insulin daily  Sha Cooper MD   Lancets MISC Give Delica lancets. Tests twice a day A.M. And P.M  Sha Cooper MD   blood glucose test strips (ONETOUCH VERIO) strip TEST IN THE MORNING AND IN THE EVENING  Sha Cooper MD   Misc. Devices MISC Please add portability to current O2 order. Resp to evaluate patient for OCD device. Neal Guzman MD   Misc. Devices KIT Dispense 1 blood pressure cuff.   Patient not taking: No sig reported  Makenna Davis MD   OXYGEN Inhale 2.5 L into the lungs nightly  Patient taking differently: Inhale 2.5 L into the lungs nightly Spouse states uses  5 L/min via nc bedtime  Mason Palacio MD   acetaminophen (TYLENOL) 500 MG tablet Take 1,000 mg by mouth daily as needed for Pain  Historical Provider, MD           Guideline directed medical:  ARNI/ACE I/ARB: No  Beta blocker: Yes  Aldosterone antagonist:  No  JARDIANCE      Physical Examination:     /75   Pulse 72   Resp 18   Wt 247 lb (112 kg)   SpO2 93%   BMI 38.69 kg/m²                    HEENT (Head, Ears, Eyes, Nose, & Throat)  HEENT (WDL): Within Defined Limits    Respiratory  Respiratory Pattern: Regular  Respiratory Depth: Normal  Respiratory Quality/Effort: Dyspnea with exertion  Chest Assessment: Chest expansion symmetrical  L Breath Sounds: Diminished, Fine Crackles  R Breath Sounds: Fine Crackles, Diminished              Cardiac  Cardiac Rhythm: Ventricular paced    Rhythm Interpretation  Heart Rate: 72         Gastrointestinal  Abdominal (WDL): Exceptions to WDL  Abdomen Inspection: Soft, Rotund, Distended  RUQ Bowel Sounds: Active  LUQ Bowel Sounds: Active  RLQ Bowel Sounds: Active  LLQ Bowel Sounds: Active          Bowel Sounds  RUQ Bowel Sounds: Active  LUQ Bowel Sounds: Active  RLQ Bowel Sounds: Active  LLQ Bowel Sounds:  Active    Peripheral Vascular  Peripheral Vascular (WDL): Exceptions to WDL  Edema: Right lower extremity, Left lower extremity  RLE Edema: Pitting, +1  LLE Edema: Pitting, +1         Skin Integumentary   Skin Integrity: Redness, Blister  Location: RLE                                       Heart Rate: 72                     LAB DATA:    Last 3 BMP      Sodium (mmol/L)   Date Value   09/13/2022 141   08/23/2022 140   08/22/2022 139     Potassium (mmol/L)   Date Value   09/13/2022 3.8   08/09/2022 4.0   07/22/2022 3.6     Potassium reflex Magnesium (mmol/L)   Date Value   08/23/2022 4.3   08/22/2022 4.4   03/10/2022 5.0     Chloride (mmol/L)   Date Value   09/13/2022 102   08/23/2022 105   08/22/2022 104     CO2 (mmol/L)   Date Value   09/13/2022 24   08/23/2022 23   08/22/2022 24     BUN (mg/dL)   Date Value   09/13/2022 32 (H)   08/23/2022 28 (H)   08/22/2022 29 (H)     Glucose (mg/dL)   Date Value   09/13/2022 158 (H)   08/23/2022 114 (H)   08/22/2022 158 (H)   04/18/2012 133 (H) 10/25/2011 138 (H)   01/28/2011 162 (H)     Calcium (mg/dL)   Date Value   09/13/2022 8.3 (L)   08/23/2022 9.2   08/22/2022 9.1       Last 3 BNP       Pro-BNP (pg/mL)   Date Value   09/13/2022 1,145 (H)   08/22/2022 2,079 (H)   08/09/2022 1,936 (H)          CBC: No results for input(s): WBC, HGB, PLT in the last 72 hours. BMP:    No results for input(s): NA, K, CL, CO2, BUN, CREATININE, GLUCOSE in the last 72 hours. Hepatic: No results for input(s): AST, ALT, ALB, BILITOT, ALKPHOS in the last 72 hours. Troponin: No results for input(s): TROPONINI in the last 72 hours. BNP: No results for input(s): BNP in the last 72 hours. Lipids: No results for input(s): CHOL, HDL in the last 72 hours. Invalid input(s): LDLCALCU  INR: No results for input(s): INR in the last 72 hours. WEIGHTS:    Wt Readings from Last 3 Encounters:   09/21/22 247 lb (112 kg)   09/19/22 246 lb (111.6 kg)   09/13/22 244 lb (110.7 kg)         TELEMETRY:  Cardiac Regularity: Regular  Cardiac Rhythm/Interpretation: VPACED        ASSESSMENT:  Vivienne Rocha   HAS 3LB WEIGHT GAIN SINCE LAST APPT ON 9/13/22. Angelina GUZMAN INSTRUCTED PT ON LOW SODIUM DIET AND IMPORTANCE OF FOLLOWING SAME. GAVE PT.  FOOD OPTIONS, PT VERBALIZED UNDERSTANDING  Interventions completed this visit:  IV diuretics given YES Bumex 2MG iv  Lab work obtained yes, YES BMP/BNP/  Reviewed currently prescribed medications with patient, educated on importance of compliance and answered any questions regarding their medication  Educated on signs and symptoms of HF  Educated on low sodium diet    PLAN:  Scheduled to follow up in CHF clinic on   Future Appointments   Date Time Provider Stacie Mtz   10/3/2022 10:00 AM [facility] CHF ROOM 1 [facility]   11/7/2022 10:00 PM [facility] SLE[facility] LAB BEDROOM 6 [facility]   11/16/2022  8:30 AM Ever Ledesma MD [facility]   11/28/2022 12:30 PM Licha Paris MD [facility]     Given clinic phone number and aware of signs and symptoms to call with any HF change in symptoms.

## 2022-09-23 ENCOUNTER — CARE COORDINATION (OUTPATIENT)
Dept: CARE COORDINATION | Age: 67
End: 2022-09-23

## 2022-09-30 ENCOUNTER — CARE COORDINATION (OUTPATIENT)
Dept: CARE COORDINATION | Age: 67
End: 2022-09-30

## 2022-09-30 ASSESSMENT — ENCOUNTER SYMPTOMS: DYSPNEA ASSOCIATED WITH: EXERTION

## 2022-09-30 NOTE — CARE COORDINATION
Ambulatory Care Coordination Note  9/30/2022    ACC: Juan R Prescott, RN      ACM spoke with Pau Alicia (spouse) for [place] COPD/CHF/diabetes follow up. She reports his podiatrist wrapped his legs because of the swelling and he was instructed to leave them wrapped until he returns next week. She reports he has not been weighing himself daily. ACM reviewed that he should be weighing himself daily to monitor if he is retaining fluid. She verbalized understanding and states she will encourage him to weigh himself daily and record his weights on the daily weight log. She states he is scheduled for CHF clinic on 10/3/22. She reports he has an increase in shortness of breath. He does not have a rescue inhaler. She states he has been wearing his oxygen throughout the day. He does not have a pulse oximeter. She states his skin color is \"good\". She reports his blood sugars have been 150-160. She denies any changes to his medications and that he is taking them as prescribed. Future appointments were reviewed. PLAN  Review SDOH, ACP, EC  Review COPD/CHF/diabetes zones  Review daily weights, blood sugars and medications with next interaction. Continue to follow for care coordination. Offered patient enrollment in the Remote Patient Monitoring (RPM) program for in-home monitoring: Patient is not eligible for RPM program.    Lab Results       None            Care Coordination Interventions    Referral from Primary Care Provider: No  Suggested Interventions and Community Resources  Fall Risk Prevention: Completed  Disease Specific Clinic: Completed (Comment: CHF)  Medication Assistance Program: Completed (Comment: insulin, Eliquis)  Social Work: Completed  Zone Management Tools: In Process (Comment: CHF/COPD/DM)          Goals Addressed                   This Visit's Progress     Conditions and Symptoms   Improving     I will schedule office visits, as directed by my provider.   I will keep my appointment or reschedule if I Rikki Cleary MD   levothyroxine (SYNTHROID) 88 MCG tablet Take 1 tablet by mouth in the morning. 7/18/22 1/14/23  Rikki Cleary MD   mometasone-formoterol [facility]) 200-5 MCG/ACT inhaler Inhale 2 puffs into the lungs in the morning and 2 puffs before bedtime. Rinse mouth after using. PAP medication. . 7/18/22   Rikki Cleary MD   insulin lispro, 1 Unit Dial, (HUMALOG KWIKPEN) 100 UNIT/ML SOPN Inject 20 Units into the skin in the morning and 20 Units at noon and 20 Units in the evening. Inject before meals. Do all this for 225 doses. 7/18/22 10/1/22  Rikki Cleary MD   insulin glargine (LANTUS SOLOSTAR) 100 UNIT/ML injection pen Inject 55 Units into the skin nightly  Patient not taking: Reported on 9/21/2022 7/13/22   Roselia Reno MD   Insulin Pen Needle (PEN NEEDLES) 31G X 6 MM MISC Once daily. May substitute brand for insurance coverage. 7/13/22   Roselia Reno MD   empagliflozin (JARDIANCE) 25 MG tablet Take 1 tablet by mouth daily 6/23/22 6/23/23  Ciarra Goodwin, DO   Ferrous Gluconate-C-Folic Acid (IRON-C PO) Take by mouth    Historical Provider, MD   Insulin Pen Needle (PEN NEEDLES) 32G X 6 MM MISC Take insulin daily 2/28/22   Nelson Montenegro MD   Lancets MISC Give Delica lancets. Tests twice a day A.M. And P.M 2/28/22   Nelson Montenegro MD   blood glucose test strips (ONETOUCH VERIO) strip TEST IN THE MORNING AND IN THE EVENING 2/28/22   Nelson Montenegro MD   Misc. Devices MISC Please add portability to current O2 order. Resp to evaluate patient for OCD device. 9/15/21   Filiberto Gomez MD   Misc. Devices KIT Dispense 1 blood pressure cuff.   Patient not taking: No sig reported 2/23/21   Sekou Carroll MD   OXYGEN Inhale 2.5 L into the lungs nightly  Patient taking differently: Inhale 2.5 L into the lungs nightly Spouse states uses  5 L/min via nc bedtime 7/28/20   Mary Mancia MD   acetaminophen (TYLENOL) 500 MG tablet Take 1,000 mg by mouth daily as needed for Pain    Historical Provider, MD Future Appointments   Date Time Provider Stacie Mtz   10/3/2022 10:00 AM [facility] CHF ROOM 1 [facility] [place]   11/7/2022 10:00 PM [facility] SLEEP LAB BEDROOM 6 [facility]   11/16/2022  8:30 AM Rachel Freedman MD [facility]   11/28/2022 12:30 PM Purvi Brooks MD [facility]   ,   Diabetes Assessment    Medic Alert ID: No  How often do you test your blood sugar?: Daily   Do you have barriers with adherence to non-pharmacologic self-management interventions?  (Nutrition/Exercise/Self-Monitoring): No   Have you ever had to go to the ED for symptoms of low blood sugar?: No       Do you have hyperglycemia symptoms?: No   Do you have hypoglycemia symptoms?: No   Last Blood Sugar Value: 152        ,   Congestive Heart Failure Assessment    Are you currently restricting fluids?: No Restriction  Do you understand a low sodium diet?: Yes  Do you understand how to read food labels?: Yes         Symptoms:  CHF associated dyspnea on exertion: Pos, CHF associated leg swelling: Pos, CHF associated shortness of breath: Pos      Patient-reported weight (lb):  (Comment: patient not weighing self daily)     ,   COPD Assessment    Does the patient understand envrionmental exposure?: Yes  Is the patient able to verbalize Rescue vs. Long Acting medications?: Yes  Does the patient have a nebulizer?: No  Does the patient use a space with inhaled medications?: No     Shortness of breath (worse than baseline)         Symptoms:     Breathlessness: exertion  Increase use of rapid acting/rescue inhaled medications?: Not Applicable  Change in chronic cough?: No/At Baseline  Change in sputum?: No/At Baseline  Self Monitoring - SaO2: No     , and   General Assessment    Do you have any symptoms that are causing concern?: No

## 2022-10-03 ENCOUNTER — HOSPITAL ENCOUNTER (OUTPATIENT)
Dept: OTHER | Age: 67
Setting detail: THERAPIES SERIES
Discharge: HOME | End: 2022-10-03
Payer: MEDICARE

## 2022-10-03 VITALS
WEIGHT: 243 LBS | SYSTOLIC BLOOD PRESSURE: 125 MMHG | RESPIRATION RATE: 18 BRPM | HEART RATE: 77 BPM | DIASTOLIC BLOOD PRESSURE: 79 MMHG | OXYGEN SATURATION: 92 % | BODY MASS INDEX: 38.06 KG/M2

## 2022-10-03 LAB
ANION GAP SERPL CALCULATED.3IONS-SCNC: 13 MMOL/L (ref 7–16)
BUN BLDV-MCNC: 30 MG/DL (ref 6–23)
CALCIUM SERPL-MCNC: 9.4 MG/DL (ref 8.6–10.2)
CHLORIDE BLD-SCNC: 98 MMOL/L (ref 98–107)
CO2: 30 MMOL/L (ref 22–29)
CREAT SERPL-MCNC: 1.2 MG/DL (ref 0.7–1.2)
GFR AFRICAN AMERICAN: >60
GFR NON-AFRICAN AMERICAN: 60 ML/MIN/1.73
GLUCOSE BLD-MCNC: 159 MG/DL (ref 74–99)
POTASSIUM SERPL-SCNC: 3.9 MMOL/L (ref 3.5–5)
PRO-BNP: 1330 PG/ML (ref 0–125)
SODIUM BLD-SCNC: 141 MMOL/L (ref 132–146)

## 2022-10-03 PROCEDURE — 96374 THER/PROPH/DIAG INJ IV PUSH: CPT

## 2022-10-03 PROCEDURE — 83880 ASSAY OF NATRIURETIC PEPTIDE: CPT

## 2022-10-03 PROCEDURE — 36415 COLL VENOUS BLD VENIPUNCTURE: CPT

## 2022-10-03 PROCEDURE — 80048 BASIC METABOLIC PNL TOTAL CA: CPT

## 2022-10-03 PROCEDURE — 99214 OFFICE O/P EST MOD 30 MIN: CPT

## 2022-10-03 PROCEDURE — 2500000003 HC RX 250 WO HCPCS: Performed by: INTERNAL MEDICINE

## 2022-10-03 PROCEDURE — 2580000003 HC RX 258: Performed by: INTERNAL MEDICINE

## 2022-10-03 RX ORDER — MULTIVIT-MIN/IRON/FOLIC ACID/K 18-600-40
2000 CAPSULE ORAL DAILY
COMMUNITY

## 2022-10-03 RX ORDER — BUMETANIDE 0.25 MG/ML
2 INJECTION, SOLUTION INTRAMUSCULAR; INTRAVENOUS ONCE
Status: COMPLETED | OUTPATIENT
Start: 2022-10-03 | End: 2022-10-03

## 2022-10-03 RX ORDER — SODIUM CHLORIDE 0.9 % (FLUSH) 0.9 %
10 SYRINGE (ML) INJECTION ONCE
Status: COMPLETED | OUTPATIENT
Start: 2022-10-03 | End: 2022-10-03

## 2022-10-03 RX ORDER — ALLOPURINOL 100 MG/1
100 TABLET ORAL DAILY
COMMUNITY

## 2022-10-03 RX ADMIN — BUMETANIDE 2 MG: 0.25 INJECTION, SOLUTION INTRAMUSCULAR; INTRAVENOUS at 10:46

## 2022-10-03 RX ADMIN — SODIUM CHLORIDE, PRESERVATIVE FREE 10 ML: 5 INJECTION INTRAVENOUS at 10:46

## 2022-10-03 NOTE — PROGRESS NOTES
Congestive Heart Failure [address]         Long Smith   1955          Referring Provider: Edgardo Alexis  Primary Care Physician: [address]  Cardiologist: NOT ESTABLISHED YET/ EP DR. Lynetta Sicard  Nephrologist:  [id number]        History of Present Illness:     Long Smith is a 69 y.o. male with a history of HFpEF, most recent EF 55-60%. Patient Story:    He does  have dyspnea with exertion, shortness of breath, or decline in overall functional capacity. He does have orthopnea, PND, nocturnal cough or hemoptysis. He does have abdominal distention or bloating, early satiety, anorexia/change in appetite. He does has a good urinary response to  oral diuretic. He does have  lower extremity edema. He denies lightheadedness, dizziness. He denies palpitations, syncope or near syncope. He does not complain of chest pain, pressure, discomfort. No Known Allergies        Prior to Visit Medications    Medication Sig Taking? Authorizing Provider   bumetanide (BUMEX) 2 MG tablet Take 1 tablet by mouth daily  Donnise Najjar, MD   metFORMIN (GLUCOPHAGE) 500 MG tablet Take 1 tablet by mouth 2 times daily (with meals)  Donnise Najjar, MD   aspirin (ASPIR-LOW) 81 MG EC tablet TAKE ONE TABLET BY MOUTH EVERY DAY  Donnise Najjar, MD   zoster recombinant adjuvanted vaccine [facility]) 50 MCG/0.5ML SUSR injection 1 dose now and repeat in 2-6 months  Donnise Najjar, MD   acetaminophen-codeine (TYLENOL #3) 300-30 MG per tablet TAKE ONE TABLET BY MOUTH EVERY 6 HOURS AS NEEDED FOR PAIN  Patient not taking: No sig reported  Historical Provider, MD   gabapentin (NEURONTIN) 300 MG capsule TAKE ONE CAPSULE BY MOUTH TWO TIMES A DAY  Historical Provider, MD   ELIQUIS 5 MG TABS tablet Take 1 tablet by mouth in the morning and 1 tablet before bedtime.   Evangelina Ruiz MD   atorvastatin (LIPITOR) 40 MG tablet TAKE ONE TABLET BY MOUTH DAILY  Evangelina Ruiz MD   metoprolol succinate (TOPROL XL) 25 MG extended release tablet Take 1 tablet by mouth in the morning. Bay Kam MD   levothyroxine (SYNTHROID) 88 MCG tablet Take 1 tablet by mouth in the morning. Bay Kam MD   mometasone-formoterol [facility]) 200-5 MCG/ACT inhaler Inhale 2 puffs into the lungs in the morning and 2 puffs before bedtime. Rinse mouth after using. PAP medication. Justin Diop MD   insulin lispro, 1 Unit Dial, (HUMALOG KWIKPEN) 100 UNIT/ML SOPN Inject 20 Units into the skin in the morning and 20 Units at noon and 20 Units in the evening. Inject before meals. Do all this for 225 doses. Bay Kam MD   insulin glargine (LANTUS SOLOSTAR) 100 UNIT/ML injection pen Inject 55 Units into the skin nightly  Patient not taking: Reported on 9/21/2022  Velia Rubi MD   Insulin Pen Needle (PEN NEEDLES) 31G X 6 MM MISC Once daily. May substitute brand for insurance coverage. Velia Rubi MD   empagliflozin (JARDIANCE) 25 MG tablet Take 1 tablet by mouth daily  Elsi Coffey,    Ferrous Gluconate-C-Folic Acid (IRON-C PO) Take by mouth  Historical Provider, MD   Insulin Pen Needle (PEN NEEDLES) 32G X 6 MM MISC Take insulin daily  Freddy Hinton MD   Lancets MISC Give Delica lancets. Tests twice a day A.M. And P.M  Freddy Hinton MD   blood glucose test strips (ONETOUCH VERIO) strip TEST IN THE MORNING AND IN THE EVENING  Freddy Hinton MD   Misc. Devices MISC Please add portability to current O2 order. Resp to evaluate patient for OCD device. Urvashi Albert MD   Misc. Devices KIT Dispense 1 blood pressure cuff.   Patient not taking: No sig reported  Nata Garg MD   OXYGEN Inhale 2.5 L into the lungs nightly  Patient taking differently: Inhale 2.5 L into the lungs nightly Spouse states uses  5 L/min via nc bedtime  Kar Barillas MD   acetaminophen (TYLENOL) 500 MG tablet Take 1,000 mg by mouth daily as needed for Pain  Historical Provider, MD           Guideline directed medical:  ARNI/ACE I/ARB: No  Beta blocker: Yes  Aldosterone antagonist:  No  JARDIANCE      Physical Examination:     /79   Pulse 77   Resp 18   Wt 243 lb (110.2 kg)   SpO2 92%   BMI 38.06 kg/m²     Assessment  Charting Type: Shift assessment              HEENT (Head, Ears, Eyes, Nose, & Throat)  HEENT (WDL): Within Defined Limits    Respiratory  Respiratory Pattern: Regular  Respiratory Depth: Normal  Respiratory Quality/Effort: Dyspnea with exertion  Chest Assessment: Chest expansion symmetrical  L Breath Sounds: Clear, Diminished  R Breath Sounds: Clear, Fine Crackles, Diminished    Breath Sounds  Right Lower Lobe: Crackles         Cardiac  Cardiac Rhythm: Ventricular paced    Rhythm Interpretation  Heart Rate: 77         Gastrointestinal  Abdominal (WDL): Exceptions to WDL  RUQ Bowel Sounds: Active  LUQ Bowel Sounds: Active  RLQ Bowel Sounds: Active  LLQ Bowel Sounds: Active          Bowel Sounds  RUQ Bowel Sounds: Active  LUQ Bowel Sounds: Active  RLQ Bowel Sounds: Active  LLQ Bowel Sounds:  Active    Peripheral Vascular  Peripheral Vascular (WDL): Exceptions to WDL  Edema: Left lower extremity, Right lower extremity  RLE Edema: Trace, Pitting  LLE Edema: Trace, Pitting                                                 Heart Rate: 77                     LAB DATA:    Last 3 BMP      Sodium (mmol/L)   Date Value   09/21/2022 138   09/13/2022 141   08/23/2022 140     Potassium (mmol/L)   Date Value   09/21/2022 4.1   09/13/2022 3.8   08/09/2022 4.0     Potassium reflex Magnesium (mmol/L)   Date Value   08/23/2022 4.3   08/22/2022 4.4   03/10/2022 5.0     Chloride (mmol/L)   Date Value   09/21/2022 97 (L)   09/13/2022 102   08/23/2022 105     CO2 (mmol/L)   Date Value   09/21/2022 27   09/13/2022 24   08/23/2022 23     BUN (mg/dL)   Date Value   09/21/2022 38 (H)   09/13/2022 32 (H)   08/23/2022 28 (H)     Glucose (mg/dL)   Date Value   09/21/2022 214 (H)   09/13/2022 158 (H)   08/23/2022 114 (H)   04/18/2012 133 (H)   10/25/2011 138 (H) 01/28/2011 162 (H)     Calcium (mg/dL)   Date Value   09/21/2022 9.0   09/13/2022 8.3 (L)   08/23/2022 9.2       Last 3 BNP       Pro-BNP (pg/mL)   Date Value   09/21/2022 1,444 (H)   09/13/2022 1,145 (H)   08/22/2022 2,079 (H)          CBC: No results for input(s): WBC, HGB, PLT in the last 72 hours. BMP:    No results for input(s): NA, K, CL, CO2, BUN, CREATININE, GLUCOSE in the last 72 hours. Hepatic: No results for input(s): AST, ALT, ALB, BILITOT, ALKPHOS in the last 72 hours. Troponin: No results for input(s): TROPONINI in the last 72 hours. BNP: No results for input(s): BNP in the last 72 hours. Lipids: No results for input(s): CHOL, HDL in the last 72 hours. Invalid input(s): LDLCALCU  INR: No results for input(s): INR in the last 72 hours. WEIGHTS:    Wt Readings from Last 3 Encounters:   10/03/22 243 lb (110.2 kg)   09/21/22 247 lb (112 kg)   09/19/22 246 lb (111.6 kg)         TELEMETRY:  Cardiac Regularity: Regular  Cardiac Rhythm/Interpretation: VPACED        ASSESSMENT:  Loretta Rocha   HAS 4LB WEIGHT loss SINCE LAST APPT ON 9/21/22. Margarette Gayle RE INSTRUCTED PT ON LOW SODIUM DIET AND IMPORTANCE OF FOLLOWING SAME. GAVE PT. FOOD OPTIONS, PT VERBALIZED UNDERSTANDING. Patient is still eating pickles and not really following a low sodium diet. Patient is taking OTC potassium supplements (99mg Q/Day). Patient not taking Jardiance due to cost. Harry Peñaloza is to call to help go over options to get his Jardiance.    Interventions completed this visit:  IV diuretics given YES Bumex 2MG iv  Lab work obtained yes, YES BMP/BNP/  Reviewed currently prescribed medications with patient, educated on importance of compliance and answered any questions regarding their medication  Educated on signs and symptoms of HF  Educated on low sodium diet    PLAN:  Scheduled to follow up in CHF clinic on   Future Appointments   Date Time Provider Stacie Mtz   10/19/2022  9:15 AM [facility] CHF ROOM 1 [facility] CHF . [place]   11/7/2022 10:00 PM [facility] SLEEP LAB BEDROOM 6 [facility] SLEEP [place] HOD   11/16/2022  8:30 AM Tiffany Singh MD [facility]   11/28/2022 12:30 PM Babar Ozuna MD [facility]     Given clinic phone number and aware of signs and symptoms to call with any HF change in symptoms.

## 2022-10-05 ENCOUNTER — CARE COORDINATION (OUTPATIENT)
Dept: CARE COORDINATION | Age: 67
End: 2022-10-05

## 2022-10-12 ENCOUNTER — CARE COORDINATION (OUTPATIENT)
Dept: CARE COORDINATION | Age: 67
End: 2022-10-12

## 2022-10-12 SDOH — HEALTH STABILITY: PHYSICAL HEALTH: ON AVERAGE, HOW MANY MINUTES DO YOU ENGAGE IN EXERCISE AT THIS LEVEL?: 0 MIN

## 2022-10-12 SDOH — HEALTH STABILITY: PHYSICAL HEALTH: ON AVERAGE, HOW MANY DAYS PER WEEK DO YOU ENGAGE IN MODERATE TO STRENUOUS EXERCISE (LIKE A BRISK WALK)?: 0 DAYS

## 2022-10-12 ASSESSMENT — SOCIAL DETERMINANTS OF HEALTH (SDOH)
IN A TYPICAL WEEK, HOW MANY TIMES DO YOU TALK ON THE PHONE WITH FAMILY, FRIENDS, OR NEIGHBORS?: MORE THAN THREE TIMES A WEEK
DO YOU BELONG TO ANY CLUBS OR ORGANIZATIONS SUCH AS CHURCH GROUPS UNIONS, FRATERNAL OR ATHLETIC GROUPS, OR SCHOOL GROUPS?: YES
HOW OFTEN DO YOU ATTENT MEETINGS OF THE CLUB OR ORGANIZATION YOU BELONG TO?: 1 TO 4 TIMES PER YEAR
HOW OFTEN DO YOU ATTEND CHURCH OR RELIGIOUS SERVICES?: NEVER
HOW OFTEN DO YOU GET TOGETHER WITH FRIENDS OR RELATIVES?: ONCE A WEEK

## 2022-10-12 NOTE — CARE COORDINATION
Ambulatory Care Coordination Note  10/12/2022    ACC: Dominic Philip, RN      SANDYM spoke with Sarah Hernandez (spouse) for Nisreen Rojo COPD/CHF/diabetes follow up. She reports his legs are still \"a little\" swollen but looking better and are no longer wrapped by the podiatrist. She states he some times wears compression stockings. She reports he has been weighing himself and is scheduled for CHF clinic 10/19/22. She reports his breathing has \"been good\". She states he is only wearing his oxygen at night. She reports his blood sugars have been 129-180. She states he is monitoring his food portions and trying to avoid sweets. ACM reviewed Advanced Care Planning and Sarah Hernandez is agreeable to assistance completing ACP documents. She denies any changes to his medications. AMINA discussed prescription assistance for Eliquis. Petra states they did provide a pharmacy print out but was not sure of the status of the application. She states they spend $85 per month for Eliquis. AMINA contacted prescription assistance and spoke with Florence Holguin. She states as of 6/30/22 Nisreen Rojo had spent $388 of the required $603 out of pocket to qualify. Florence Holguin requested a pharmacy printout from 7/1/22 to the present. [facility] notified Petra of PAP request.  Future appointments were reviewed. PLAN  ACP referral.  Review daily weights, blood sugars, appointments and medications with next interaction. Continue to follow for care coordination. Offered patient enrollment in the Remote Patient Monitoring (RPM) program for in-home monitoring: Patient is not eligible for RPM program.    Lab Results       None            Care Coordination Interventions    Referral from Primary Care Provider: No  Suggested Interventions and Community Resources  Fall Risk Prevention: Completed  Disease Specific Clinic: Completed (Comment: CHF)  Medication Assistance Program: Completed (Comment: insulin, Eliquis)  Social Work: Completed  Zone Management Tools:  In Process (Comment: CHF/COPD/DM) Goals Addressed                   This Visit's Progress     Conditions and Symptoms   On track     I will schedule office visits, as directed by my provider. I will keep my appointment or reschedule if I have to cancel. I will notify my provider of any barriers to my plan of care. I will follow my Zone Management tool to seek urgent or emergent care. I will notify my provider of any symptoms that indicate a worsening of my condition. Barriers: lack of motivation and lack of education  Plan for overcoming my barriers: CHF/COPD/DM zone handout, care coordination   Confidence: 7/10  Anticipated Goal Completion Date: 12/16/22         Medication Management   On track     I will notify my provider/Care Coordinator if I am unable to afford my medications. Barriers: financial and lack of education  Plan for overcoming my barriers: Prescription Assistance Program, GILDARDO, Care coordination  Confidence: 9/10  Anticipated Goal Completion Date: 12/16/22              Prior to Admission medications    Medication Sig Start Date End Date Taking?  Authorizing Provider   allopurinol (ZYLOPRIM) 100 MG tablet Take 100 mg by mouth daily    Historical Provider, MD   Cholecalciferol (VITAMIN D) 50 MCG (2000 UT) CAPS capsule Take 1,000 Units by mouth daily    Historical Provider, MD   bumetanide (BUMEX) 2 MG tablet Take 1 tablet by mouth daily 8/31/22   Charles Alston MD   metFORMIN (GLUCOPHAGE) 500 MG tablet Take 1 tablet by mouth 2 times daily (with meals) 8/31/22   Charles Alston MD   aspirin (ASPIR-LOW) 81 MG EC tablet TAKE ONE TABLET BY MOUTH EVERY DAY 8/31/22   Charles Alston MD   zoster recombinant adjuvanted vaccine [facility]) 50 MCG/0.5ML SUSR injection 1 dose now and repeat in 2-6 months 8/31/22   Charles Alston MD   acetaminophen-codeine (TYLENOL #3) 300-30 MG per tablet TAKE ONE TABLET BY MOUTH EVERY 6 HOURS AS NEEDED FOR PAIN  Patient not taking: No sig reported 7/18/22   Historical Provider, MD   gabapentin (NEURONTIN) 300 MG capsule TAKE ONE CAPSULE BY MOUTH TWO TIMES A DAY 8/12/22   Historical Provider, MD   ELIQUIS 5 MG TABS tablet Take 1 tablet by mouth in the morning and 1 tablet before bedtime. 7/18/22   Bing Cruz MD   atorvastatin (LIPITOR) 40 MG tablet TAKE ONE TABLET BY MOUTH DAILY 7/18/22   Bing Cruz MD   metoprolol succinate (TOPROL XL) 25 MG extended release tablet Take 1 tablet by mouth in the morning. 7/18/22   Bing Cruz MD   levothyroxine (SYNTHROID) 88 MCG tablet Take 1 tablet by mouth in the morning. 7/18/22 1/14/23  Bing Cruz MD   mometasone-formoterol [facility]) 200-5 MCG/ACT inhaler Inhale 2 puffs into the lungs in the morning and 2 puffs before bedtime. Rinse mouth after using. PAP medication. . 7/18/22   Bing Cruz MD   insulin lispro, 1 Unit Dial, (HUMALOG KWIKPEN) 100 UNIT/ML SOPN Inject 20 Units into the skin in the morning and 20 Units at noon and 20 Units in the evening. Inject before meals. Do all this for 225 doses. 7/18/22 10/3/22  Bing Cruz MD   insulin glargine (LANTUS SOLOSTAR) 100 UNIT/ML injection pen Inject 55 Units into the skin nightly  Patient not taking: No sig reported 7/13/22   Frankie Gongora MD   Insulin Pen Needle (PEN NEEDLES) 31G X 6 MM MISC Once daily. May substitute brand for insurance coverage. 7/13/22   Frankie Gongora MD   empagliflozin (JARDIANCE) 25 MG tablet Take 1 tablet by mouth daily  Patient not taking: Reported on 10/3/2022 6/23/22 6/23/23  Thor Duke, DO   Ferrous Gluconate-C-Folic Acid (IRON-C PO) Take by mouth    Historical Provider, MD   Insulin Pen Needle (PEN NEEDLES) 32G X 6 MM MISC Take insulin daily 2/28/22   Nina Guillory MD   Lancets MISC Give Delica lancets. Tests twice a day A.M. And P.M 2/28/22   Nina Guillory MD   blood glucose test strips (ONETOUCH VERIO) strip TEST IN THE MORNING AND IN THE EVENING 2/28/22   Nina Guillory MD   Misc. Devices MISC Please add portability to current O2 order.  Resp to evaluate patient for OCD device. 9/15/21   Columba Oh MD   Misc. Devices KIT Dispense 1 blood pressure cuff. Patient not taking: No sig reported 2/23/21   Fouzia Alvarez MD   OXYGEN Inhale 2.5 L into the lungs nightly  Patient taking differently: Inhale 2.5 L into the lungs nightly Spouse states uses  5 L/min via nc bedtime 7/28/20   Elena Gould MD   acetaminophen (TYLENOL) 500 MG tablet Take 1,000 mg by mouth daily as needed for Pain    Historical Provider, MD       Future Appointments   Date Time Provider Stacie Mtz   10/19/2022  9:15 AM [facility] ROOM 1 [facility]   11/7/2022 10:00 PM [facility] LAB BEDROOM 6 [facility] SLEEP [facility]   11/16/2022  8:30 AM Dawson Jaime MD [facility]   11/28/2022 12:30 PM Shahid Merritt MD [facility]   ,   Diabetes Assessment    Medic Alert ID: No  Meal Planning: Avoidance of concentrated sweets, Carb counting   How often do you test your blood sugar?: Daily   Do you have barriers with adherence to non-pharmacologic self-management interventions?  (Nutrition/Exercise/Self-Monitoring): No   Have you ever had to go to the ED for symptoms of low blood sugar?: No       Do you have hyperglycemia symptoms?: No   Do you have hypoglycemia symptoms?: No   Last Blood Sugar Value: 180        ,   Congestive Heart Failure Assessment    Are you currently restricting fluids?: No Restriction  Do you understand a low sodium diet?: Yes  Do you understand how to read food labels?: Yes  How many restaurant meals do you eat per week?: 1-2  Do you salt your food before tasting it?: No         Symptoms:  CHF associated leg swelling: Pos      Symptom course: stable  Patient-reported weight (lb): 239  Weight trend: stable  Salt intake watch compared to last visit: improved     ,   COPD Assessment    Does the patient understand envrionmental exposure?: Yes  Is the patient able to verbalize Rescue vs. Long Acting medications?: Yes  Does the patient have a nebulizer?: No  Does the patient use a

## 2022-10-19 ENCOUNTER — HOSPITAL ENCOUNTER (OUTPATIENT)
Dept: OTHER | Age: 67
Setting detail: THERAPIES SERIES
Discharge: HOME | End: 2022-10-19
Payer: MEDICARE

## 2022-10-19 VITALS
WEIGHT: 237 LBS | HEART RATE: 72 BPM | SYSTOLIC BLOOD PRESSURE: 134 MMHG | DIASTOLIC BLOOD PRESSURE: 62 MMHG | BODY MASS INDEX: 37.12 KG/M2 | OXYGEN SATURATION: 94 % | RESPIRATION RATE: 18 BRPM

## 2022-10-19 LAB
ANION GAP SERPL CALCULATED.3IONS-SCNC: 15 MMOL/L (ref 7–16)
BUN BLDV-MCNC: 39 MG/DL (ref 6–23)
CALCIUM SERPL-MCNC: 9.8 MG/DL (ref 8.6–10.2)
CHLORIDE BLD-SCNC: 97 MMOL/L (ref 98–107)
CO2: 27 MMOL/L (ref 22–29)
CREAT SERPL-MCNC: 1.5 MG/DL (ref 0.7–1.2)
GFR SERPL CREATININE-BSD FRML MDRD: 50 ML/MIN/1.73
GLUCOSE BLD-MCNC: 191 MG/DL (ref 74–99)
POTASSIUM SERPL-SCNC: 3.9 MMOL/L (ref 3.5–5)
PRO-BNP: 848 PG/ML (ref 0–125)
SODIUM BLD-SCNC: 139 MMOL/L (ref 132–146)

## 2022-10-19 PROCEDURE — 80048 BASIC METABOLIC PNL TOTAL CA: CPT

## 2022-10-19 PROCEDURE — 99214 OFFICE O/P EST MOD 30 MIN: CPT

## 2022-10-19 PROCEDURE — 83880 ASSAY OF NATRIURETIC PEPTIDE: CPT

## 2022-10-19 NOTE — PROGRESS NOTES
Congestive Heart Failure [address]         Adron Phalen   1955          Referring Provider: Edgardo Alexis  Primary Care Physician: [address]  Cardiologist: NOT ESTABLISHED YET/ EP DR. Radha Bahena  Nephrologist:  [facility]        History of Present Illness:     Adron Phalen is a 69 y.o. male with a history of HFpEF, most recent EF 55-60%. Patient Story:    He does  have dyspnea with exertion, shortness of breath, or decline in overall functional capacity. He does have orthopnea, PND, nocturnal cough or hemoptysis. He does have abdominal distention or bloating, early satiety, anorexia/change in appetite. He does has a good urinary response to  oral diuretic. He does have  SLIGHT lower extremity edema. He denies lightheadedness, dizziness. He denies palpitations, syncope or near syncope. He does not complain of chest pain, pressure, discomfort. No Known Allergies        Prior to Visit Medications    Medication Sig Taking?  Authorizing Provider   allopurinol (ZYLOPRIM) 100 MG tablet Take 100 mg by mouth daily  Historical Provider, MD   Cholecalciferol (VITAMIN D) 50 MCG (2000 UT) CAPS capsule Take 1,000 Units by mouth daily  Historical Provider, MD   bumetanide (BUMEX) 2 MG tablet Take 1 tablet by mouth daily  Preethi Otero MD   metFORMIN (GLUCOPHAGE) 500 MG tablet Take 1 tablet by mouth 2 times daily (with meals)  Preethi Otero MD   aspirin (ASPIR-LOW) 81 MG EC tablet TAKE ONE TABLET BY MOUTH EVERY DAY  Preethi Otero MD   zoster recombinant adjuvanted vaccine [facility]) 50 MCG/0.5ML SUSR injection 1 dose now and repeat in 2-6 months  Preethi Otero MD   acetaminophen-codeine (TYLENOL #3) 300-30 MG per tablet TAKE ONE TABLET BY MOUTH EVERY 6 HOURS AS NEEDED FOR PAIN  Patient not taking: No sig reported  Historical Provider, MD   gabapentin (NEURONTIN) 300 MG capsule TAKE ONE CAPSULE BY MOUTH TWO TIMES A DAY  Historical Provider, MD   ELIQUIS 5 MG TABS tablet Take 1 tablet by mouth in the morning and 1 tablet before bedtime. Magnolia Waldrop MD   atorvastatin (LIPITOR) 40 MG tablet TAKE ONE TABLET BY MOUTH DAILY  Magnolia Waldrop MD   metoprolol succinate (TOPROL XL) 25 MG extended release tablet Take 1 tablet by mouth in the morning. Magnolia Waldrop MD   levothyroxine (SYNTHROID) 88 MCG tablet Take 1 tablet by mouth in the morning. Magnolia Waldrop MD   mometasone-formoterol [facility]) 200-5 MCG/ACT inhaler Inhale 2 puffs into the lungs in the morning and 2 puffs before bedtime. Rinse mouth after using. PAP medication. Natividad Staton MD   insulin lispro, 1 Unit Dial, (HUMALOG KWIKPEN) 100 UNIT/ML SOPN Inject 20 Units into the skin in the morning and 20 Units at noon and 20 Units in the evening. Inject before meals. Do all this for 225 doses. Magnolia Waldrop MD   insulin glargine (LANTUS SOLOSTAR) 100 UNIT/ML injection pen Inject 55 Units into the skin nightly  Amadeo Joyce MD   Insulin Pen Needle (PEN NEEDLES) 31G X 6 MM MISC Once daily. May substitute brand for insurance coverage. Amadeo Joyce MD   empagliflozin (JARDIANCE) 25 MG tablet Take 1 tablet by mouth daily  Patient not taking: No sig reported  Cuauhtemoc Helm,    Ferrous Gluconate-C-Folic Acid (IRON-C PO) Take by mouth  Historical Provider, MD   Insulin Pen Needle (PEN NEEDLES) 32G X 6 MM MISC Take insulin daily  Everardo Rodriguez MD   Lancets MISC Give Delica lancets. Tests twice a day A.M. And P.M  Everardo Rodriguez MD   blood glucose test strips (ONETOUCH VERIO) strip TEST IN THE MORNING AND IN THE EVENING  Everardo Rodriguez MD   Misc. Devices MISC Please add portability to current O2 order. Resp to evaluate patient for OCD device. Stefania Reyes MD   Misc. Devices KIT Dispense 1 blood pressure cuff.   Patient not taking: No sig reported  Prudence Suresh MD   OXYGEN Inhale 2.5 L into the lungs nightly  Patient taking differently: Inhale 2.5 L into the lungs nightly Spouse states uses  5 L/min via nc bedtime  Allen Ron MD   acetaminophen (TYLENOL) 500 MG tablet Take 1,000 mg by mouth daily as needed for Pain  Historical Provider, MD           Guideline directed medical:  ARNI/ACE I/ARB: No  Beta blocker: Yes  Aldosterone antagonist:  No  JARDIANCE      Physical Examination:     /62   Pulse 72   Resp 18   Wt 237 lb (107.5 kg)   SpO2 94%   BMI 37.12 kg/m²     Assessment  Charting Type: Shift assessment              HEENT (Head, Ears, Eyes, Nose, & Throat)  HEENT (WDL): Within Defined Limits    Respiratory  Respiratory Pattern: Regular  Respiratory Depth: Normal  Respiratory Quality/Effort: Dyspnea with exertion  Chest Assessment: Chest expansion symmetrical  L Breath Sounds: Clear, Diminished  R Breath Sounds: Clear, Fine Crackles, Diminished    Breath Sounds  Right Lower Lobe: Crackles         Cardiac  Cardiac Rhythm: Ventricular paced    Rhythm Interpretation  Heart Rate: 72         Gastrointestinal  Abdominal (WDL): Exceptions to WDL  RUQ Bowel Sounds: Active  LUQ Bowel Sounds: Active  RLQ Bowel Sounds: Active  LLQ Bowel Sounds: Active          Bowel Sounds  RUQ Bowel Sounds: Active  LUQ Bowel Sounds: Active  RLQ Bowel Sounds: Active  LLQ Bowel Sounds:  Active    Peripheral Vascular  Peripheral Vascular (WDL): Exceptions to WDL  Edema: Left lower extremity, Right lower extremity  RLE Edema: Trace, Pitting  LLE Edema: Trace, Pitting         Skin Integumentary   Skin Integrity: Redness  Location: BLE                                       Heart Rate: 72                     LAB DATA:    Last 3 BMP      Sodium (mmol/L)   Date Value   10/03/2022 141   09/21/2022 138   09/13/2022 141     Potassium (mmol/L)   Date Value   10/03/2022 3.9   09/21/2022 4.1   09/13/2022 3.8     Potassium reflex Magnesium (mmol/L)   Date Value   08/23/2022 4.3   08/22/2022 4.4   03/10/2022 5.0     Chloride (mmol/L)   Date Value   10/03/2022 98   09/21/2022 97 (L)   09/13/2022 102     CO2 (mmol/L)   Date Value   10/03/2022 30 (H)   09/21/2022 27   09/13/2022 24     BUN (mg/dL)   Date Value   10/03/2022 30 (H)   09/21/2022 38 (H)   09/13/2022 32 (H)     Glucose (mg/dL)   Date Value   10/03/2022 159 (H)   09/21/2022 214 (H)   09/13/2022 158 (H)   04/18/2012 133 (H)   10/25/2011 138 (H)   01/28/2011 162 (H)     Calcium (mg/dL)   Date Value   10/03/2022 9.4   09/21/2022 9.0   09/13/2022 8.3 (L)       Last 3 BNP       Pro-BNP (pg/mL)   Date Value   10/03/2022 1,330 (H)   09/21/2022 1,444 (H)   09/13/2022 1,145 (H)          CBC: No results for input(s): WBC, HGB, PLT in the last 72 hours. BMP:    No results for input(s): NA, K, CL, CO2, BUN, CREATININE, GLUCOSE in the last 72 hours. Hepatic: No results for input(s): AST, ALT, ALB, BILITOT, ALKPHOS in the last 72 hours. Troponin: No results for input(s): TROPONINI in the last 72 hours. BNP: No results for input(s): BNP in the last 72 hours. Lipids: No results for input(s): CHOL, HDL in the last 72 hours. Invalid input(s): LDLCALCU  INR: No results for input(s): INR in the last 72 hours. WEIGHTS:    Wt Readings from Last 3 Encounters:   10/19/22 237 lb (107.5 kg)   10/03/22 243 lb (110.2 kg)   09/21/22 247 lb (112 kg)         TELEMETRY:  Cardiac Regularity: Regular  Cardiac Rhythm/Interpretation: VPACED        ASSESSMENT:  Miya Rocha   HAS 5LB WEIGHT LOSS SINCE LAST APPT ON 10/3/22. PT ADMITS DOES NOT SEE A DIFFERENCE WHEN iv Bumex IS GIVEN, PT REQUESTING NO iv TODAY, STATES FEELS BETTER . INSTRUCTED PT IF bnp IS ELEVATED WILL HAVE TO COME TO SOONER APPT, PT VERBALIZED UNDERSTANDING. MEDICATION LIST GIVEN TO PT  TO HAVE WIFE REVIEWED AND CALL TO CONFIRM, PT VERBALIZED UNDERSTANDING.     Interventions completed this visit:  IV diuretics given NO  Lab work obtained yes, YES BMP/BNP/  Reviewed currently prescribed medications with patient, educated on importance of compliance and answered any questions regarding their medication  Educated on signs and symptoms of HF  Educated on low sodium diet    PLAN:  Scheduled to follow up in CHF clinic on   Future Appointments   Date Time Provider Stacie Mtz   11/7/2022 10:00 PM [facility] BEDROOM 6 [facility] [place] HOD   11/9/2022  9:30 AM [facility] ROOM 1 [facility] [place]   11/16/2022  8:30 AM Velma Waldron MD [facility]   11/28/2022 12:30 PM Edgar Aleman MD [address] [facility]     Given clinic phone number and aware of signs and symptoms to call with any HF change in symptoms.

## 2022-10-27 ENCOUNTER — CARE COORDINATION (OUTPATIENT)
Dept: CARE COORDINATION | Age: 67
End: 2022-10-27

## 2022-10-28 ENCOUNTER — TELEPHONE (OUTPATIENT)
Dept: CARE COORDINATION | Age: 67
End: 2022-10-28

## 2022-10-28 NOTE — TELEPHONE ENCOUNTER
ACPS received a referral for AD's and goals of care from the [facility][note 3] for Elida Ruiz. ACPS called and spoke with Elida Ruiz today. He states he was unaware his wife requested this; processed what AD's are with Elida Ruiz. Elida Ruiz states since his wife wanted him to have them he agrees to them as well. Reviewed that they will be mailed out on 11/2 and that this ACPS will follow up with lEida Ruiz a couple weeks following to ensure he received them and review them. Will process help with completion on 2nd call. Elida Ruiz verbalized understanding and agreement. Request for mail was made.     ACPS to follow accordingly

## 2022-11-01 RX ORDER — OXYCODONE AND ACETAMINOPHEN 7.5; 325 MG/1; MG/1
1 TABLET ORAL 2 TIMES DAILY PRN
COMMUNITY
Start: 2022-10-03

## 2022-11-01 RX ORDER — FERROUS SULFATE 325(65) MG
325 TABLET ORAL
COMMUNITY

## 2022-11-01 NOTE — PROGRESS NOTES
[address]   PRE-ADMISSION TESTING GENERAL INSTRUCTIONS  PAT Phone Number: [phone]      GENERAL INSTRUCTIONS:    [x] Antibacterial Soap Shower Night before and/or AM of surgery. [] CHG Wipes instruction sheet and wipes given. [] Hibiclens shower the night before and the morning of surgery.   -Do not use Hibiclens on your face or head. [x] Do not wear makeup, lotions, powders, deodorant. [x] Nothing by mouth after midnight; including gum, candy, mints, or water. [x] You may brush your teeth, gargle, but do NOT swallow water. [] No tobacco products, illegal drugs, or alcohol within 24 hours of your surgery. [x] Jewelry or valuables should not be brought to the hospital. All body and/or tongue piercing's must be removed prior to arriving to hospital. No contact lens or hair pins. [x] Arrange transportation with a responsible adult  to and from the hospital. Arrange for someone to be with you for the remainder of the day and for 24 hours after your procedure due to having had anesthesia. -Who will be your  for transportation? _WIFE, SHERYL_________________         -Who will be staying with you for 24 hrs after your procedure? __SHERYL________________  [x] Bring insurance card and photo ID.  [] Bring copy of living will or healthcare power of  papers to be placed in your electronic record. [] Urine Pregnancy test will be preformed the day of surgery. A specimen sample may be brought from home. [] Transfusion Bracelet: Please bring with you to hospital, day of surgery. PARKING INSTRUCTIONS:     [x] ARRIVAL DATE & TIME: 11/8, 0800  [x] Enter into the [employer]. Two people may accompany you. Masks are not required but are recommended. [x] Parking Lot \"I\" is where you will park. It is located on the corner of [address] and [address]. The entrance is on [address].    Upon entering the parking lot, a voucher ticket will print    EDUCATION INSTRUCTIONS:        [] Knee or Hip replacement booklet & exercise pamphlets given. [] [address] placed in chart. [] Pre-admission Testing educational folder given  [] Incentive Spirometry,coughing & deep breathing exercises reviewed. [] Medication information sheet(s)   [] Fluoroscopy-Xray used in surgery reviewed with patient. Educational pamphlet placed in chart. [x] Pain: Post-op pain is normal and to be expected. You will be asked to rate your pain from 0-10. [] Joint camp offered. [] Joint replacement booklets given.  [] Spine Navigator to see in PAT. [x] Other:_WEAR LOOSE COMFORTABLE CLOTHING. SHIRT MAY GET DRAINAGE ON IT.__________________________    MEDICATION INSTRUCTIONS:    [x] Bring a complete list of your medications, please write the last time you took the medicine, give this list to the nurse in Pre-Op. [x] Take only the following medications the morning of surgery with 1-2 ounces of water: SEE LIST  [x] Stop all herbal supplements and vitamins 5 days before surgery. Stop NSAIDS 7 days before surgery. [x] DO NOT take any diabetic medicine the morning of surgery. Follow instructions for insulin the day before surgery. [x] If you are diabetic and your blood sugar is low or you feel symptomatic, you may drink 1-2 ounces of apple juice or take a glucose tablet.            -The morning of your procedure, you may call the pre-op area if you have concerns about your blood sugar [phone]. [x] Use your inhalers the morning of surgery. Bring your emergency inhaler with you day of surgery. [x] Follow physician instructions regarding any blood thinners you may be taking. MAKI MCKEE 11/4. ASA HOLD DOS. WHAT TO EXPECT:    [x] The day of surgery you will be greeted and checked in by the [employer].  In addition, you will be registered in the [place] by a Patient Access Representative.  Please bring your photo ID and insurance card. A nurse will greet you in accordance to the time you are needed in the pre-op area to prepare you for surgery. Please do not be discouraged if you are not greeted in the order you arrive as there are many variables that are involved in patient preparation. Your patience is greatly appreciated as you wait for your nurse. Please bring in items such as: books, magazines, newspapers, electronics, or any other items  to occupy your time in the waiting area. [x]  Delays may occur with surgery and staff will make a sincere effort to keep you informed of delays. If any delays occur with your procedure, we apologize ahead of time for your inconvenience as we recognize the value of your time.

## 2022-11-07 ENCOUNTER — CARE COORDINATION (OUTPATIENT)
Dept: CARE COORDINATION | Age: 67
End: 2022-11-07

## 2022-11-07 ENCOUNTER — ANESTHESIA EVENT (OUTPATIENT)
Dept: OPERATING ROOM | Age: 67
End: 2022-11-07
Payer: MEDICARE

## 2022-11-08 ENCOUNTER — ANESTHESIA (OUTPATIENT)
Dept: OPERATING ROOM | Age: 67
End: 2022-11-08
Payer: MEDICARE

## 2022-11-08 ENCOUNTER — HOSPITAL ENCOUNTER (OUTPATIENT)
Age: 67
Setting detail: OUTPATIENT SURGERY
Discharge: HOME | End: 2022-11-08
Attending: SURGERY | Admitting: SURGERY
Payer: MEDICARE

## 2022-11-08 VITALS
SYSTOLIC BLOOD PRESSURE: 106 MMHG | WEIGHT: 230 LBS | HEART RATE: 61 BPM | RESPIRATION RATE: 18 BRPM | TEMPERATURE: 97.5 F | BODY MASS INDEX: 36.1 KG/M2 | HEIGHT: 67 IN | DIASTOLIC BLOOD PRESSURE: 61 MMHG | OXYGEN SATURATION: 99 %

## 2022-11-08 DIAGNOSIS — Z01.812 PRE-OPERATIVE LABORATORY EXAMINATION: Primary | ICD-10-CM

## 2022-11-08 DIAGNOSIS — C49.20: ICD-10-CM

## 2022-11-08 LAB — METER GLUCOSE: 160 MG/DL (ref 74–99)

## 2022-11-08 PROCEDURE — 7100000010 HC PHASE II RECOVERY - FIRST 15 MIN: Performed by: SURGERY

## 2022-11-08 PROCEDURE — 2500000003 HC RX 250 WO HCPCS: Performed by: SURGERY

## 2022-11-08 PROCEDURE — 6360000002 HC RX W HCPCS

## 2022-11-08 PROCEDURE — 3700000000 HC ANESTHESIA ATTENDED CARE: Performed by: SURGERY

## 2022-11-08 PROCEDURE — 88304 TISSUE EXAM BY PATHOLOGIST: CPT

## 2022-11-08 PROCEDURE — 2500000003 HC RX 250 WO HCPCS

## 2022-11-08 PROCEDURE — 7100000011 HC PHASE II RECOVERY - ADDTL 15 MIN: Performed by: SURGERY

## 2022-11-08 PROCEDURE — 11406 EXC TR-EXT B9+MARG >4.0 CM: CPT | Performed by: SURGERY

## 2022-11-08 PROCEDURE — 2580000003 HC RX 258: Performed by: SURGERY

## 2022-11-08 PROCEDURE — 6360000002 HC RX W HCPCS: Performed by: SURGERY

## 2022-11-08 PROCEDURE — 3600000012 HC SURGERY LEVEL 2 ADDTL 15MIN: Performed by: SURGERY

## 2022-11-08 PROCEDURE — 3600000002 HC SURGERY LEVEL 2 BASE: Performed by: SURGERY

## 2022-11-08 PROCEDURE — 82962 GLUCOSE BLOOD TEST: CPT

## 2022-11-08 PROCEDURE — 2709999900 HC NON-CHARGEABLE SUPPLY: Performed by: SURGERY

## 2022-11-08 PROCEDURE — 3700000001 HC ADD 15 MINUTES (ANESTHESIA): Performed by: SURGERY

## 2022-11-08 RX ORDER — FENTANYL CITRATE 50 UG/ML
INJECTION, SOLUTION INTRAMUSCULAR; INTRAVENOUS PRN
Status: DISCONTINUED | OUTPATIENT
Start: 2022-11-08 | End: 2022-11-08 | Stop reason: SDUPTHER

## 2022-11-08 RX ORDER — PHENYLEPHRINE HCL IN 0.9% NACL 1 MG/10 ML
SYRINGE (ML) INTRAVENOUS PRN
Status: DISCONTINUED | OUTPATIENT
Start: 2022-11-08 | End: 2022-11-08 | Stop reason: SDUPTHER

## 2022-11-08 RX ORDER — PROPOFOL 10 MG/ML
INJECTION, EMULSION INTRAVENOUS CONTINUOUS PRN
Status: DISCONTINUED | OUTPATIENT
Start: 2022-11-08 | End: 2022-11-08 | Stop reason: SDUPTHER

## 2022-11-08 RX ORDER — LIDOCAINE HYDROCHLORIDE AND EPINEPHRINE 10; 10 MG/ML; UG/ML
INJECTION, SOLUTION INFILTRATION; PERINEURAL PRN
Status: DISCONTINUED | OUTPATIENT
Start: 2022-11-08 | End: 2022-11-08 | Stop reason: ALTCHOICE

## 2022-11-08 RX ORDER — MIDAZOLAM HYDROCHLORIDE 1 MG/ML
INJECTION INTRAMUSCULAR; INTRAVENOUS PRN
Status: DISCONTINUED | OUTPATIENT
Start: 2022-11-08 | End: 2022-11-08 | Stop reason: SDUPTHER

## 2022-11-08 RX ORDER — SODIUM CHLORIDE 0.9 % (FLUSH) 0.9 %
5-40 SYRINGE (ML) INJECTION EVERY 12 HOURS SCHEDULED
Status: DISCONTINUED | OUTPATIENT
Start: 2022-11-08 | End: 2022-11-08 | Stop reason: HOSPADM

## 2022-11-08 RX ORDER — SODIUM CHLORIDE 0.9 % (FLUSH) 0.9 %
5-40 SYRINGE (ML) INJECTION PRN
Status: DISCONTINUED | OUTPATIENT
Start: 2022-11-08 | End: 2022-11-08 | Stop reason: HOSPADM

## 2022-11-08 RX ORDER — SODIUM CHLORIDE 9 MG/ML
INJECTION, SOLUTION INTRAVENOUS PRN
Status: DISCONTINUED | OUTPATIENT
Start: 2022-11-08 | End: 2022-11-08 | Stop reason: HOSPADM

## 2022-11-08 RX ADMIN — CEFAZOLIN 2000 MG: 2 INJECTION, POWDER, FOR SOLUTION INTRAMUSCULAR; INTRAVENOUS at 10:04

## 2022-11-08 RX ADMIN — SODIUM CHLORIDE: 9 INJECTION, SOLUTION INTRAVENOUS at 09:50

## 2022-11-08 RX ADMIN — FENTANYL CITRATE 50 MCG: 50 INJECTION, SOLUTION INTRAMUSCULAR; INTRAVENOUS at 10:58

## 2022-11-08 RX ADMIN — Medication 100 MCG: at 10:16

## 2022-11-08 RX ADMIN — Medication 100 MCG: at 10:14

## 2022-11-08 RX ADMIN — Medication 100 MCG: at 11:07

## 2022-11-08 RX ADMIN — MIDAZOLAM 1 MG: 1 INJECTION INTRAMUSCULAR; INTRAVENOUS at 09:54

## 2022-11-08 RX ADMIN — PROPOFOL 75 MCG/KG/MIN: 10 INJECTION, EMULSION INTRAVENOUS at 10:02

## 2022-11-08 RX ADMIN — Medication 100 MCG: at 10:43

## 2022-11-08 RX ADMIN — Medication 100 MCG: at 11:02

## 2022-11-08 RX ADMIN — Medication 200 MCG: at 10:53

## 2022-11-08 RX ADMIN — Medication 100 MCG: at 10:35

## 2022-11-08 RX ADMIN — FENTANYL CITRATE 50 MCG: 50 INJECTION, SOLUTION INTRAMUSCULAR; INTRAVENOUS at 10:02

## 2022-11-08 RX ADMIN — Medication 100 MCG: at 10:28

## 2022-11-08 RX ADMIN — Medication 100 MCG: at 10:25

## 2022-11-08 ASSESSMENT — PAIN - FUNCTIONAL ASSESSMENT: PAIN_FUNCTIONAL_ASSESSMENT: 0-10

## 2022-11-08 NOTE — PROGRESS NOTES
Case Management Initial Discharge Plan  Case Vásquez,             Met with:called patient's gfriend, Megan Helm, to discuss discharge plans. Initially called mother - no answer, called father - unsure of answers  Information verified: address, contacts, phone number, , insurance Yes  Insurance Provider: [address] [place] [address]    Emergency Contact/Next of Kin name & number: Michele Hernandez - mother - [phone]  Ed Gaspar - father - [phone] (lives in [place], [place])  Sierra Monroe - gfriend - [phone]  Who are involved in patient's support system? Gfriend, mother, father    PCP: has no PCP  Date of last visit:       Discharge Planning    Living Arrangements: Pt stays between his mother's home and Norma's home, mostly at [facility] [address]       Home has 1 stories  0 stairs to climb to get into front door, 0 stairs to climb to reach second floor  Location of bedroom/bathroom in home  - one story apt. Mother's home is 2 story home, sleeps on couch on main floor    Patient able to perform ADL's:Independent    Current Services (outpatient & in home) none - supposed to go to OP PT but does not go  DME equipment: no  DME provider:     Is patient receiving oral anticoagulation therapy? No    If indicated:   Physician managing anticoagulation treatment:   Where does patient obtain lab work for ATC treatment? Does patient have any issues/concerns obtaining medications? No  If yes, what are patient's concerns? Is there a preferred Pharmacy after hours or on weekends? Yes  [facility] in [place] or [facility] on [address]    If yes, which pharmacy?     Potential Assistance Needed: ARU/SNF/LTAC      Patient agreeable to home care: Yes  Freedom of choice provided:  yes    Prior SNF/Rehab Placement and Facility: no  Agreeable to SNF/Rehab: Yes  [place] of choice provided: yes     Evaluation: no    Expected Discharge date:       Patient expects to be discharged to: SNF/ARU/LTAC      If home: is the Patient's wife has concerns over patient being discharged home. Patient's wife states that patient seems to be more lethargic than normal. This RN notified that anesthesia can make you feel drowsy and that it is normal to be more lethargic after surgery. Patient has sat on the side of the bed and urinated, ate and drank, and is arousalable to voice. Patient's wife then stated that patient's one leg is more swollen than the other leg. Message sent to Dr. Julianne Borjas asking for a phone call to discuss concerns. Electronically signed by Angelic Henry RN on 11/8/2022 at 1:23 PM      Spoke to Dr. Joe Fuentes via phone and he stated it is normal for patient to possibly need his home O2 after surgery due to being drowsy after anesthesia. Notified that patient's wife is concerned that patient has been more lethargic over the last couple days and has concerns over his leg. Dr. Joe Fuentes stated patient does not need admitted over the surgery performed today and that if the wife has concerns she needs to call patient's primary care docotr.   Electronically signed by Angelic Henry RN on 11/8/2022 at 1:36 PM family and/or caregiver wiling & able to provide support at home? Who will be providing this support? Follow Up Appointment: Best Day/ Time:      Transportation provider: drives, family  Transportation arrangements needed for discharge: Yes, amb for placement    Readmission Risk              Risk of Unplanned Readmission:  12             Does patient have a readmission risk score greater than 14?: No  If yes, follow-up appointment must be made within 7 days of discharge.      Goals of Care:       Educated gfriend on transitional options, provided freedom of choice and are agreeable with plan      Discharge Plan:  Pt intubated/sedated  Will see how pt progresses to determine next LOC - anticipate SNF/LTAC/ARU          Electronically signed by Gi Dickerson on 4/13/22 at 10:15 AM EDT

## 2022-11-08 NOTE — OP NOTE
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    Post-Op Diagnosis: Same       Procedure(s):  EXCISON OF SOFT TISSUE NEOPLASM OF UPPER BACK    Surgeon(s):  Apple Church MD    Assistant:   Resident: Matthias Encinas DO    Anesthesia: Monitor Anesthesia Care    Estimated Blood Loss (mL):  71XV    Complications: None    Specimens:   ID Type Source Tests Collected by Time Destination   A : SOFT TISSUE NEOPLASM OF THE BACK Tissue Tissue SURGICAL PATHOLOGY Apple Church MD 11/8/2022 1059        Implants:  * No implants in log *      Drains: * No LDAs found *    Findings: 4.5 x 4.5cm soft tissue neoplasm    Detailed Description of Procedure: The patient was taken to the operating room and placed in the left lateral decubitus position on the operating table. Pneumatic compression devices were placed on the lower extremities bilaterally. [facility] and administered 2 grams of Ancef intravenously on induction. The operative site on the patient's superior back was prepped with ChloraPrep and draped in the usual sterile fashion. A 5cm incision  with a  #15-blade scalpel was made overlying the soft tissue neoplasm. Sharp and blunt dissection with #15 blade and finger/mosquito dissection was used to dissect the anterior, superior, medial, lateral and inferior margins of the lesion. Dissection was carried down to the level of subcutaneous tissue. A soft tissue neoplasm was removed and measured to be 4.5cm x 4.5cm. Meticulous hemostasis was achieved at the surgical site using careful electrocautery. The surgical site was irrigated with sterile saline and again inspected for bleeding. There was no further bleeding seen. Deep dermal sutures using 3-0 Vicryl were then placed to close the deep space and to reapproximate the skin.  4-0 vicryl suture was then used to close the skin in a running subcuticular fashion. The skin was cleaned and dried and covered with skin glue      The patient tolerated the procedure well. There were no complications. The patient will be observed in the postanesthesia care unit and then discharged home when criteria are met.      Electronically signed by Mukesh Peres DO on 11/8/2022 at 12:01 PM

## 2022-11-08 NOTE — PROGRESS NOTES
Dr. Babb Most came to bedside and spoke to patient and patient's wife. Patient is to be discharged home today.   Electronically signed by Giovanni Alonso RN on 11/8/2022 at 1:44 PM

## 2022-11-08 NOTE — ANESTHESIA POSTPROCEDURE EVALUATION
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     Mando Phase I: Mando Score: 10    Mando Phase II: Mando Score: 9      Anesthesia Post Evaluation    Patient location during evaluation: bedside  Patient participation: complete - patient cannot participate  Level of consciousness: awake and alert  Airway patency: patent  Nausea & Vomiting: no nausea and no vomiting  Complications: no  Cardiovascular status: blood pressure returned to baseline  Respiratory status: acceptable  Hydration status: euvolemic  Multimodal analgesia pain management approach

## 2022-11-08 NOTE — PROGRESS NOTES
Discharge instructions reviewed with patient and patient's wife. Understanding stated of instructions.   Electronically signed by Mounika Sahu RN on 11/8/2022 at 1:45 PM

## 2022-11-08 NOTE — H&P
Patient ID: Amanda Saeed is a 69 y.o. male. HPI  69 yr old male here with complaint of cyst on back. Pt states has been present for about a year. States he had area lanced about 10 years ago. Pt reports the area is increasing in size. Denies pain in area. Denies drainage from area. Denies fevers/chills.      Past Medical History        Past Medical History:   Diagnosis Date    RADHA (acute kidney injury) ([facility] 3/10/2022    Atrial fibrillation ([facility]      Carpal tunnel syndrome      Encounter regarding vascular access for dialysis for ESRD ([facility] 3/12/2022    Hyperlipidemia      Hypertension      Hypothyroidism      Lung nodule      Nocturnal hypoxemia due to obesity 8/8/2013    Obesity      NIKO (obstructive sleep apnea) 8/8/2013     [facility]    Osteoarthritis      Pinched nerve       Lumbar    Restrictive lung disease secondary to obesity 7/25/2016    Sinus node dysfunction (HCC)      Type II or unspecified type diabetes mellitus without mention of complication, not stated as uncontrolled              Past Surgical History         Past Surgical History:   Procedure Laterality Date    BACK SURGERY   2012     [facility]. Pinched nerve in back    BACK SURGERY   05/30/2017    COLONOSCOPY   2007     multiple colon polyps    COLONOSCOPY   06/04/2012     COLONOSCOPY    COLONOSCOPY   04/26/2022     polyp; diverticula--sarah    COLONOSCOPY N/A 4/26/2022     COLONOSCOPY POLYPECTOMY HOT BIOPSY (Snare) performed by Azra Briseno MD at [id number] [place] [address]         lennie cataracts implant    HERNIA REPAIR         umbilical    PACEMAKER PLACEMENT   10/03/2017     Medtronic dual chamber    Dr. Rajwinder Richards Right              Current Facility-Administered Medications          Current Outpatient Medications   Medication Sig Dispense Refill    bumetanide (BUMEX) 2 MG tablet Take 1 tablet by mouth daily 30 tablet 3    metFORMIN (GLUCOPHAGE) 500 MG tablet Take 1 tablet by mouth 2 times daily (with meals) 60 tablet 5    aspirin (ASPIR-LOW) 81 MG EC tablet TAKE ONE TABLET BY MOUTH EVERY DAY 90 tablet 1    zoster recombinant adjuvanted vaccine (SHINGRIX) 50 MCG/0.5ML SUSR injection 1 dose now and repeat in 2-6 months 0.5 mL 0    gabapentin (NEURONTIN) 300 MG capsule TAKE ONE CAPSULE BY MOUTH TWO TIMES A DAY        ELIQUIS 5 MG TABS tablet Take 1 tablet by mouth in the morning and 1 tablet before bedtime. 180 tablet 1    atorvastatin (LIPITOR) 40 MG tablet TAKE ONE TABLET BY MOUTH DAILY 90 tablet 1    metoprolol succinate (TOPROL XL) 25 MG extended release tablet Take 1 tablet by mouth in the morning. 90 tablet 1    levothyroxine (SYNTHROID) 88 MCG tablet Take 1 tablet by mouth in the morning. 90 tablet 1    mometasone-formoterol (DULERA) 200-5 MCG/ACT inhaler Inhale 2 puffs into the lungs in the morning and 2 puffs before bedtime. Rinse mouth after using. PAP medication. . 3 each 2    insulin lispro, 1 Unit Dial, (HUMALOG KWIKPEN) 100 UNIT/ML SOPN Inject 20 Units into the skin in the morning and 20 Units at noon and 20 Units in the evening. Inject before meals. Do all this for 225 doses. 27 mL 1    insulin glargine (LANTUS SOLOSTAR) 100 UNIT/ML injection pen Inject 55 Units into the skin nightly 15 pen 3    Insulin Pen Needle (PEN NEEDLES) 31G X 6 MM MISC Once daily. May substitute brand for insurance coverage. 100 each 3    empagliflozin (JARDIANCE) 25 MG tablet Take 1 tablet by mouth daily 90 tablet 3    Ferrous Gluconate-C-Folic Acid (IRON-C PO) Take by mouth        Insulin Pen Needle (PEN NEEDLES) 32G X 6 MM MISC Take insulin daily 100 each 1    Lancets MISC Give Delica lancets. Tests twice a day A.M. And P.M 200 each 1    blood glucose test strips (ONETOUCH VERIO) strip TEST IN THE MORNING AND IN THE EVENING 200 each 1    Misc. Devices MISC Please add portability to current O2 order. Resp to evaluate patient for OCD device.  1 each 0    OXYGEN Inhale 2.5 L into the lungs nightly (Patient taking differently: Inhale 2.5 L into the lungs nightly Spouse states uses  5 L/min via nc bedtime) 1 Device 99    acetaminophen (TYLENOL) 500 MG tablet Take 1,000 mg by mouth daily as needed for Pain        acetaminophen-codeine (TYLENOL #3) 300-30 MG per tablet TAKE ONE TABLET BY MOUTH EVERY 6 HOURS AS NEEDED FOR PAIN (Patient not taking: No sig reported)        Misc. Devices KIT Dispense 1 blood pressure cuff. (Patient not taking: No sig reported) 1 kit 0      No current facility-administered medications for this visit.             No Known Allergies     Family History         Family History   Problem Relation Age of Onset    Cancer Mother           skin    Heart Disease Mother      High Blood Pressure Father      Amyotrophic lateral sclerosis Father      Cancer Brother      High Blood Pressure Brother      Diabetes Brother              Social History               Socioeconomic History    Marital status:        Spouse name: Not on file    Number of children: Not on file    Years of education: Not on file    Highest education level: Not on file   Occupational History    Not on file   Tobacco Use    Smoking status: Former       Packs/day: 0.10       Years: 35.00       Pack years: 3.50       Types: Cigarettes       Quit date: 2004       Years since quittin.8    Smokeless tobacco: Former       Quit date:    Vaping Use    Vaping Use: Never used   Substance and Sexual Activity    Alcohol use: No       Alcohol/week: 0.0 standard drinks    Drug use: No    Sexual activity: Yes       Partners: Female   Other Topics Concern    Not on file   Social History Narrative    Not on file      Social Determinants of Health          Financial Resource Strain: High Risk    Difficulty of Paying Living Expenses: Very hard   Food Insecurity: No Food Insecurity    Worried About Running Out of Food in the Last Year: Never true    Ran Out of Food in the Last Year: Never true   Transportation Needs: No Transportation Needs    Lack of Transportation (Medical): No    Lack of Transportation (Non-Medical): No   Physical Activity: Inactive    Days of Exercise per Week: 0 days    Minutes of Exercise per Session: 0 min   Stress: Not on file   Social Connections: Not on file   Intimate Partner Violence: Not on file   Housing Stability: Low Risk     Unable to Pay for Housing in the Last Year: No    Number of Places Lived in the Last Year: 1    Unstable Housing in the Last Year: No         Review of Systems   Constitutional:  Negative for activity change, appetite change, chills, fever and unexpected weight change. HENT: Negative. Phlegm build up in throat   Eyes: Negative. Respiratory:  Positive for cough, shortness of breath and wheezing. Negative for choking and chest tightness. Cardiovascular:  Positive for leg swelling. Negative for chest pain and palpitations. Gastrointestinal:  Positive for abdominal distention. Negative for abdominal pain, anal bleeding, blood in stool, constipation, diarrhea, nausea and vomiting. Endocrine: Positive for polydipsia and polyuria. Negative for cold intolerance and heat intolerance. Genitourinary:  Positive for dysuria, frequency and urgency. Negative for hematuria, penile discharge, penile pain, penile swelling, scrotal swelling and testicular pain. Musculoskeletal:  Positive for back pain, gait problem, joint swelling, neck pain and neck stiffness. Negative for arthralgias and myalgias. Skin:  Negative for color change, pallor, rash and wound. Allergic/Immunologic: Negative for environmental allergies and food allergies. Neurological:  Negative for dizziness, seizures, syncope, weakness, light-headedness, numbness and headaches. Hematological:  Negative for adenopathy. Bruises/bleeds easily. Psychiatric/Behavioral:  Negative for agitation, confusion, decreased concentration, hallucinations, self-injury and suicidal ideas.  The patient is not nervous/anxious and is not hyperactive. Objective:   Physical Exam  Constitutional:       General: He is not in acute distress. Appearance: Normal appearance. He is well-developed. He is not ill-appearing, toxic-appearing or diaphoretic. HENT:      Head: Normocephalic and atraumatic. Nose: Nose normal.      Mouth/Throat:      Mouth: Mucous membranes are moist.      Pharynx: Oropharynx is clear. Eyes:      General: No scleral icterus. Right eye: No discharge. Left eye: No discharge. Extraocular Movements: Extraocular movements intact. Pupils: Pupils are equal, round, and reactive to light. Cardiovascular:      Rate and Rhythm: Normal rate. Rhythm irregular. Heart sounds: Normal heart sounds. No murmur heard. Pulmonary:      Effort: Pulmonary effort is normal. No respiratory distress. Breath sounds: Normal breath sounds. No stridor. No wheezing, rhonchi or rales. Abdominal:      General: Bowel sounds are normal. There is no distension. Palpations: Abdomen is soft. There is no mass. Tenderness: There is no abdominal tenderness. There is no guarding or rebound. Hernia: No hernia is present. Musculoskeletal:         General: Normal range of motion. Cervical back: Normal range of motion and neck supple. Skin:     General: Skin is warm and dry. Neurological:      General: No focal deficit present. Mental Status: He is alert and oriented to person, place, and time. Psychiatric:         Mood and Affect: Mood normal.         Behavior: Behavior normal.         Thought Content: Thought content normal.         Judgment: Judgment normal.         Assessment:   Soft tissue neoplasm upper back                Plan:   Recommend excision. The patient was explained the risks/benefits/alternatives/expected outcomes of the procedure.   The patient was explained the risks, including, but not limited to, bleeding, infection, reaction to the anesthesia medicine, and recurrence. All questions were answered. The patient verbalized understanding and agreed to proceed. Pt is at higher risk due to Eliquis use and need to hold.                  Jim Arzola MD      UPDATED 11/8/22  History and physical unchanged  For excision of soft tissue neoplasm of upper back    Jim Arzola MD, [facility]  11/8/2022  8:33 AM

## 2022-11-08 NOTE — PROGRESS NOTES
Spoke to Dr. Branden Jalloh regarding patient. Patient is okay to resume eliquis tomorrow.   Electronically signed by Marry Duke RN on 11/8/2022 at 12:21 PM

## 2022-11-08 NOTE — ANESTHESIA PRE PROCEDURE
Department of Anesthesiology  Preprocedure Note       Name:  Yanira Oliver   Age:  69 y.o.  :  1955                                          MRN:  [id number]         Date:  2022      Surgeon: Harshil Danielle):  Randal Gray MD    Procedure: Procedure(s):  EXCISON OF SOFT TISSUE NEOPLASM OF UPPER BACK    Medications prior to admission:   Prior to Admission medications    Medication Sig Start Date End Date Taking? Authorizing Provider   ferrous sulfate (IRON 325) 325 (65 Fe) MG tablet Take 325 mg by mouth daily (with breakfast)   Yes Historical Provider, MD   oxyCODONE-acetaminophen (PERCOCET) 7.5-325 MG per tablet Take 1 tablet by mouth 2 times daily as needed. 10/3/22   Historical Provider, MD   allopurinol (ZYLOPRIM) 100 MG tablet Take 100 mg by mouth daily    Historical Provider, MD   Cholecalciferol (VITAMIN D) 50 MCG (2000 UT) CAPS capsule Take 2,000 Units by mouth daily    Historical Provider, MD   bumetanide (BUMEX) 2 MG tablet Take 1 tablet by mouth daily 22   Jesus Farfan MD   metFORMIN (GLUCOPHAGE) 500 MG tablet Take 1 tablet by mouth 2 times daily (with meals) 22   Jesus Farfan MD   aspirin (ASPIR-LOW) 81 MG EC tablet TAKE ONE TABLET BY MOUTH EVERY DAY 22   Jesus Farfan MD   zoster recombinant adjuvanted vaccine [facility]) 50 MCG/0.5ML SUSR injection 1 dose now and repeat in 2-6 months 22   Jesus Farfan MD   gabapentin (NEURONTIN) 300 MG capsule Take 600 mg by mouth in the morning and at bedtime. 22   Historical Provider, MD   ELIQUIS 5 MG TABS tablet Take 1 tablet by mouth in the morning and 1 tablet before bedtime. 22   Zenobia Mcgowan MD   atorvastatin (LIPITOR) 40 MG tablet TAKE ONE TABLET BY MOUTH DAILY 22   Zenobia Mcgowan MD   metoprolol succinate (TOPROL XL) 25 MG extended release tablet Take 1 tablet by mouth in the morning. 22   Zenobia Mcgowan MD   levothyroxine (SYNTHROID) 88 MCG tablet Take 1 tablet by mouth in the morning.  22  Adilia Servin Chantale Saldana MD   mometasone-formoterol [facility]) 200-5 MCG/ACT inhaler Inhale 2 puffs into the lungs in the morning and 2 puffs before bedtime. Rinse mouth after using. PAP medication. . 7/18/22   Fior Dye MD   insulin lispro, 1 Unit Dial, (HUMALOG KWIKPEN) 100 UNIT/ML SOPN Inject 20 Units into the skin in the morning and 20 Units at noon and 20 Units in the evening. Inject before meals. Do all this for 225 doses. Patient taking differently: Inject 35 Units into the skin every morning (before breakfast) 15 UNITS TID [facility] 7/18/22 10/19/22  Fior Dye MD   insulin glargine (LANTUS SOLOSTAR) 100 UNIT/ML injection pen Inject 55 Units into the skin nightly 7/13/22   Zina Hill MD   Insulin Pen Needle (PEN NEEDLES) 31G X 6 MM MISC Once daily. May substitute brand for insurance coverage. 7/13/22   Zina Hill MD   empagliflozin (JARDIANCE) 25 MG tablet Take 1 tablet by mouth daily 6/23/22 6/23/23  Perez Maureen,    Insulin Pen Needle (PEN NEEDLES) 32G X 6 MM MISC Take insulin daily 2/28/22   Callie Flores MD   Lancets [facility] Give Delica lancets. Tests twice a day A.M. And P.M 2/28/22   Callie Flores MD   blood glucose test strips (ONETOUCH VERIO) strip TEST IN THE MORNING AND IN THE EVENING 2/28/22   Callie Flores MD   Misc. Devices MISC Please add portability to current O2 order. Resp to evaluate patient for OCD device. 9/15/21   Erica Braswell MD   Misc. Devices KIT Dispense 1 blood pressure cuff.   Patient not taking: No sig reported 2/23/21   Duane Moreno MD   OXYGEN Inhale 2.5 L into the lungs nightly  Patient taking differently: Inhale 2.5 L into the lungs nightly Spouse states uses  5 L/min via nc bedtime 7/28/20   Flores Lai MD   acetaminophen (TYLENOL) 500 MG tablet Take 1,000 mg by mouth daily as needed for Pain    Historical Provider, MD       Current medications:    Current Facility-Administered Medications   Medication Dose Route Frequency Provider Last Rate Last Admin    sodium chloride flush 0.9 % injection 5-40 mL  5-40 mL IntraVENous 2 times per day Azra Briseno MD        sodium chloride flush 0.9 % injection 5-40 mL  5-40 mL IntraVENous PRN Azra Briseno MD        0.9 % sodium chloride infusion   IntraVENous PRN Azra Briseno MD        ceFAZolin (ANCEF) 2,000 mg in sterile water 20 mL IV syringe  2,000 mg IntraVENous On Call to [address] [place] [address], MD           Allergies:  No Known Allergies    Problem List:    Patient Active Problem List   Diagnosis Code    DM (diabetes mellitus) ([facility] E11.9    Class 2 obesity due to excess calories with serious comorbidity and body mass index (BMI) of 38.0 to 38.9 in adult [id number]    Hyperlipidemia E78.5    Essential hypertension I10    Hypothyroidism E03.9    Lung nodules R91.8    Arthritis of shoulder region, left M19.012    OA (osteoarthritis of the spine) M47.9    S/P laminectomy with spinal fusion Z98.1    Colorectal polyps K63.5, K62.1    Diverticula of colon K57.30    NIKO (obstructive sleep apnea) G47.33    Hypoxemia R09.02    Restrictive lung disease secondary to obesity J98.4, E66.9    Atypical atrial flutter (HCC) I48.4    Sinus node dysfunction (HCC) I49.5    Pacemaker Z95.0    Persistent atrial fibrillation ([facility] I48.19    Diabetes mellitus type 2 in obese (HCC) E11.69, E66.9    Obesity E66.9    Chronic obstructive pulmonary disease, unspecified COPD type ([facility] J44.9    Acute heart failure with preserved ejection fraction (HFpEF) ([facility] I50.31    Chronic hypercapnic respiratory failure ([facility] J96.12    Encounter regarding vascular access for dialysis for ESRD ([facility] N18.6, Z99.2    At risk for colon cancer Z91.89    Acute on chronic congestive heart failure (HCC) I50.9    Acute on chronic diastolic heart failure (HCC) I50.33    Hyponatremia E87.1    Normocytic anemia D64.9    Hypotension I95.9    Stage 3b chronic kidney disease (HCC) N18.32    Acute on chronic heart failure with preserved ejection fraction ([facility] I50.33       Past Medical History:        Diagnosis Date    RADHA (acute kidney injury) ([facility] 3/10/2022    Atrial fibrillation ([facility]     Carpal tunnel syndrome     Encounter regarding vascular access for dialysis for ESRD ([facility] 3/12/2022    Hyperlipidemia     Hypertension     Hypothyroidism     Lung nodule     Nocturnal hypoxemia due to obesity 2013    Obesity     NIKO (obstructive sleep apnea) 2013    [facility]    Osteoarthritis     Pinched nerve     Lumbar    Restrictive lung disease secondary to obesity 2016    Sinus node dysfunction (HCC)     Type II or unspecified type diabetes mellitus without mention of complication, not stated as uncontrolled        Past Surgical History:        Procedure Laterality Date    BACK SURGERY      [facility]. Liat Peterson nerve in back    BACK SURGERY  2017    COLONOSCOPY      multiple colon polyps    COLONOSCOPY  2012    COLONOSCOPY    COLONOSCOPY  2022    polyp; diverticula--sarah    COLONOSCOPY N/A 2022    COLONOSCOPY POLYPECTOMY HOT BIOPSY (Snare) performed by Cassy Watson MD at [facility]      lennie cataracts implant    HERNIA REPAIR      umbilical    PACEMAKER PLACEMENT  10/03/2017    Medtronic dual chamber    Dr. Viridiana Quinn Right        Social History:    Social History     Tobacco Use    Smoking status: Former     Packs/day: 0.10     Years: 35.00     Pack years: 3.50     Types: Cigarettes     Quit date: 2004     Years since quittin.9    Smokeless tobacco: Former     Quit date:    Substance Use Topics    Alcohol use: No     Alcohol/week: 0.0 standard drinks                                Counseling given: Not Answered      Vital Signs (Current):   Vitals:    22 1404   Weight: 230 lb (104.3 kg)   Height: 5' 7\" (1.702 m)                                              BP Readings from Last 3 Encounters:   10/19/22 134/62 10/03/22 125/79   09/21/22 133/75       NPO Status: Time of last liquid consumption: 2330                        Time of last solid consumption: 1900                        Date of last liquid consumption: 11/07/22                        Date of last solid food consumption: 11/07/22    BMI:   Wt Readings from Last 3 Encounters:   11/01/22 230 lb (104.3 kg)   10/19/22 237 lb (107.5 kg)   10/03/22 243 lb (110.2 kg)     Body mass index is 36.02 kg/m². CBC:   Lab Results   Component Value Date/Time    WBC 7.7 08/23/2022 05:07 AM    RBC 3.41 08/23/2022 05:07 AM    HGB 10.8 08/23/2022 05:07 AM    HCT 33.4 08/23/2022 05:07 AM    MCV 97.9 08/23/2022 05:07 AM    RDW 17.1 08/23/2022 05:07 AM     08/23/2022 05:07 AM       CMP:   Lab Results   Component Value Date/Time     10/19/2022 10:00 AM    K 3.9 10/19/2022 10:00 AM    K 4.3 08/23/2022 05:07 AM    CL 97 10/19/2022 10:00 AM    CO2 27 10/19/2022 10:00 AM    BUN 39 10/19/2022 10:00 AM    CREATININE 1.5 10/19/2022 10:00 AM    GFRAA >60 10/03/2022 10:55 AM    LABGLOM 50 10/19/2022 10:00 AM    GLUCOSE 191 10/19/2022 10:00 AM    GLUCOSE 133 04/18/2012 08:43 AM    PROT 7.6 08/22/2022 12:38 PM    CALCIUM 9.8 10/19/2022 10:00 AM    BILITOT 0.5 08/22/2022 12:38 PM    ALKPHOS 96 08/22/2022 12:38 PM    AST 16 08/22/2022 12:38 PM    ALT 25 08/22/2022 12:38 PM       POC Tests: No results for input(s): POCGLU, POCNA, POCK, POCCL, POCBUN, POCHEMO, POCHCT in the last 72 hours.     Coags:   Lab Results   Component Value Date/Time    PROTIME 14.8 03/11/2022 02:27 PM    INR 1.3 03/11/2022 02:27 PM    APTT 34.4 03/11/2022 02:27 PM       HCG (If Applicable): No results found for: PREGTESTUR, PREGSERUM, HCG, HCGQUANT     ABGs: No results found for: PHART, PO2ART, WIZ4SAZ, HIE6YPN, BEART, N1WPHRYN     Type & Screen (If Applicable):  No results found for: LABABO, LABRH    Drug/Infectious Status (If Applicable):  No results found for: HIV, HEPCAB    COVID-19 Screening (If Applicable):   Lab Results   Component Value Date/Time    COVID19 Not Detected 09/17/2021 09:53 PM           Anesthesia Evaluation  Patient summary reviewed  Airway: Mallampati: III  TM distance: >3 FB   Neck ROM: full  Mouth opening: > = 3 FB   Dental:      Comment: Dentition is poor with multiple missing molars, right upper incisor is loose. Pulmonary:   (+) COPD:  sleep apnea:  decreased breath sounds                           ROS comment: Former smoker: 1 PPD x 44 years, started at age 3. Quit 15 years ago   Cardiovascular:    (+) hypertension:, pacemaker: pacemaker, dysrhythmias ( Atypical atrial flutter ): atrial flutter, CHF:, murmur ( Grade 3/6 murmur), hyperlipidemia        Rhythm: regular  Rate: normal                 ROS comment: ECG: Ventricular-paced rhythm  Abnormal ECG  When compared with ECG of 11-MAR-2022 06:47,  No significant change was found  Confirmed by Lindsay Funez ([id number]) on 3/11/2022 1:32:14 PM    ECHO:    Left Ventricle   Left ventricle is normal in size . Micro-bubble contrast injected to enhance left ventricular visualization. No regional wall motion abnormalities seen. Normal left ventricular ejection fraction. Ejection fraction is visually estimated at 55-60%. Indeterminate diastolic function. Right Ventricle   The right ventricle was not clearly visualized. Grossly normal right ventricular size and function. Left Atrium   Normal sized left atrium. Interatrial septum appears intact. Right Atrium   Right atrium is not clearly visualized. Mitral Valve   Structurally normal mitral valve. Tricuspid Valve   The tricuspid valve was not well visualized. Aortic Valve   The aortic valve leaflets were not well visualized. Pulmonic Valve   The pulmonic valve was not well visualized. Pericardial Effusion   No evidence of pericardial effusion. Aorta   Aortic root dimension within normal limits.       Conclusions      Summary   Technically suboptimal and limited study. Left ventricle is normal in size . No regional wall motion abnormalities seen. Normal left ventricular ejection fraction. Signature      ----------------------------------------------------------------   Electronically signed by Lucio Mascorro MD(Interpreting   physician) on 09/20/2021 04:25 PM     Neuro/Psych:   (+) neuromuscular disease ( CTS):,             GI/Hepatic/Renal:   (+) morbid obesity          Endo/Other:    (+) DiabetesType II DM, poorly controlled, , hypothyroidism::., .                 Abdominal:   (+) obese ( Morbidly obese),           Vascular: Other Findings:             Anesthesia Plan      MAC     ASA 4       Induction: intravenous. MIPS: Prophylactic antiemetics administered. Anesthetic plan and risks discussed with patient. Plan discussed with CRNA.                     Eric West DO   11/8/2022

## 2022-11-08 NOTE — DISCHARGE INSTRUCTIONS
AMBULATORY PROCEDURE DISCHARGE INSTRUCTIONS    You may be drowsy or lightheaded after receiving sedation or anesthesia. A responsible person should be with you for the next 24 hours. Please follow the instructions checked below:    DIET INSTRUCTIONS:  [x]Start with light diet and progress to your normal diet as you feel like eating. If you experience nausea or repeated episodes of vomiting which persist beyond 12-24 hours, notify your doctor. ACTIVITY INSTRUCTIONS:  [x] Rest today. Increase activity as tolerated      [x] No driving while on narcotics     [] Elevate operative limb   [] No heavy lifting or strenuous activity      WOUND/DRESSING INSTRUCTIONS:  Always ensure you and your care giver clean hands before and after caring for the wound. [x] May shower 24 hours after surgery  [x] Remove dressings after 24hrs if in place    [x] Glue is on your dressing which will dissolve on its own - Do not apply lotion, gel, or liquid to wound while the glue is in place. MEDICATION INSTRUCTIONS:  [x]  Prescriptions sent with you. Use as directed. [x] When taking pain medications, you may experience dizziness or drowsiness. Do not drink alcohol or drive when taking these medications. [x] You may take a non-prescription headache remedy, preferably one that does not contain aspirin. [x] Do not exceed 4000mg of Tylenol/Acetaminophen per day. Vicodin/Norco/Percocet contain acetaminophen. Do not take additional amounts of Tylenol if you are taking these medications. Call physician if they or any other problems occur:  Fever over 101°    Redness, swelling, hardness or warmth at the operative site  Unrelieved nausea    Foul smelling or cloudy drainage at the operative site   Unrelieved pain    Blood soaked dressing. (Some oozing may be normal)    Ok to resume Eliquis 11/9.

## 2022-11-09 ENCOUNTER — HOSPITAL ENCOUNTER (OUTPATIENT)
Dept: OTHER | Age: 67
Setting detail: THERAPIES SERIES
Discharge: HOME | End: 2022-11-09
Payer: MEDICARE

## 2022-11-10 ENCOUNTER — TELEPHONE (OUTPATIENT)
Dept: INTERNAL MEDICINE | Age: 67
End: 2022-11-10

## 2022-11-10 DIAGNOSIS — Z79.4 TYPE 2 DIABETES MELLITUS WITH DIABETIC AUTONOMIC NEUROPATHY, WITH LONG-TERM CURRENT USE OF INSULIN (HCC): ICD-10-CM

## 2022-11-10 DIAGNOSIS — E11.43 TYPE 2 DIABETES MELLITUS WITH DIABETIC AUTONOMIC NEUROPATHY, WITH LONG-TERM CURRENT USE OF INSULIN (HCC): ICD-10-CM

## 2022-11-10 RX ORDER — INSULIN LISPRO 100 [IU]/ML
20 INJECTION, SOLUTION INTRAVENOUS; SUBCUTANEOUS
Qty: 27 ML | Refills: 1
Start: 2022-11-10 | End: 2023-01-24

## 2022-11-10 NOTE — TELEPHONE ENCOUNTER
VM message left for patient or his wife to return call regarding pt's Humalog dose. Pt's wife returned call. States pt is taking Humalog 20 units three times daily before meals.

## 2022-11-15 ENCOUNTER — CARE COORDINATION (OUTPATIENT)
Dept: CARE COORDINATION | Age: 67
End: 2022-11-15

## 2022-11-15 NOTE — LETTER
November 15, 2022      [address] [place] [id number]      Dear Alona Sanders and Hien Bella:    I have enjoyed working with you to improve your health. I would like to continue to provide you support. However, I have been unable to reach you at, [phone] (home) . Please let me know if I can help schedule an office visit for you or answer any questions. If you would like continued access to your Ambulatory Care Manager, Ninfa Barr RN you can reach me at [phone]  I will wait to hear from you and will no longer reach out to you. Dante Kirkland MD and his team will continue to provide care and be available for questions.     Sincerely,    Jeannette Smith RN, [facility]  Ambulatory Care Manager

## 2022-11-15 NOTE — CARE COORDINATION
Attempted to reach family by telephone. Left HIPAA compliant message requesting a return call. Will send unable to reach letter.

## 2022-11-15 NOTE — PROGRESS NOTES
[facility]  Internal Medicine Residency Program  ACC Note      SUBJECTIVE:  CC: had concerns including Pain (Right knee ,ankle and foot Onset x 2 weeks ago states was unable to walk ,denies falling ), Arthritis (generalize), and Diabetes (  -124).     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  - States has these for past 2 years, have not healed  - Occasional pus and bleeding from these  - To see Dermatology    Ankle Sprain  - Twisted ankle 2 weeks prior, improving on its own, does not need any further intervention    IDDM2  - Last A1c 7.2 on 8/2022  - Takes Lantus 55u daily, Lipro 20u with meals, Metformin 500 mg BID, Jardiance 25 mg daily     HFpEF  - Bumex 2 mg daily  - Taking Toprol-XL 25 mg daily  - No ACEI/ARB since recent [facility] admission  - NYHA-2  - Not decompensated, but not at baseline either, to take extra bumex for the next 4 days, will be seen at CHF clinic in 2 weeks  - If BMP ok, then will start lisinopril     HTN  - On Toprol-XL 25 mg  - BP today 133/70     HLD  - On Atorvastatin 40 mg  - Due for Lipid Panel     Obesity Hypoventilation Syndrome  - On nightly oxygen at 5L, stable on RA at this time  - Follows up with Dr. Young Abel     Persistent AF  - [id number] = 4  - On Toprol-XL and Eliquis     Sinus Node Dysfunction s/p dual chamber pacemaker since 10/2017  - Follows up with EP  - Denies any symptoms     Hypothyroidism  - On Synthroid 88 mcg  - Last TSH 21.63 on 7/2022  - To get TSH and T4 repeated    Gout  - On Allopurinol 100 mg daily  - Last uric acid level 14.2 on 6/2022, 7.6 today    HCM  - Due for Covid-19, Shingles vaccines  - Due for repeat lipid panel    Review of Systems   Constitutional:  Negative for activity change, appetite change, chills, fatigue, fever and unexpected weight change. HENT:  Negative for postnasal drip and sore throat. Respiratory:  Negative for cough, shortness of breath and wheezing. Cardiovascular:  Positive for leg swelling. Negative for chest pain and palpitations. Gastrointestinal:  Negative for abdominal distention, abdominal pain, constipation, diarrhea, nausea and vomiting. Genitourinary:  Negative for difficulty urinating and dysuria. Musculoskeletal:  Positive for arthralgias and joint swelling. Skin:  Positive for rash. Neurological:  Negative for dizziness, syncope, light-headedness and headaches. Psychiatric/Behavioral:  Negative for behavioral problems. Outpatient Medications Marked as Taking for the 11/16/22 encounter (Office Visit) with Dev Malloy MD   Medication Sig Dispense Refill    bumetanide (BUMEX) 2 MG tablet Take 1 tablet by mouth daily 30 tablet 3    gabapentin (NEURONTIN) 300 MG capsule Take 2 capsules by mouth in the morning and at bedtime for 90 days. 360 capsule 0    mometasone-formoterol (DULERA) 200-5 MCG/ACT inhaler Inhale 2 puffs into the lungs 2 times daily Rinse mouth after using. PAP medication. 3 each 2    insulin glargine (LANTUS SOLOSTAR) 100 UNIT/ML injection pen Inject 55 Units into the skin nightly 17 Adjustable Dose Pre-filled Pen Syringe 2    Insulin Pen Needle (PEN NEEDLES) 31G X 6 MM MISC Once daily. May substitute brand for insurance coverage. 100 each 3    Lancets MISC Give Delica lancets. Tests twice a day A.M.  And P.M 200 each 1    blood glucose test strips (ONETOUCH VERIO) strip TEST IN THE MORNING AND IN THE EVENING 200 each 1    lisinopril (PRINIVIL;ZESTRIL) 5 MG tablet Take 1 tablet by mouth daily 90 tablet 0    insulin lispro, 1 Unit Dial, (HUMALOG KWIKPEN) 100 UNIT/ML SOPN Inject 20 Units into the skin 3 times daily (before meals) for 225 doses 27 mL 1    ferrous sulfate (IRON 325) 325 (65 Fe) MG tablet Take 325 mg by mouth daily (with breakfast)      oxyCODONE-acetaminophen (PERCOCET) 7.5-325 MG per tablet Take 1 tablet by mouth 2 times daily as needed. Cholecalciferol (VITAMIN D) 50 MCG (2000 UT) CAPS capsule Take 2,000 Units by mouth daily      metFORMIN (GLUCOPHAGE) 500 MG tablet Take 1 tablet by mouth 2 times daily (with meals) 60 tablet 5    aspirin (ASPIR-LOW) 81 MG EC tablet TAKE ONE TABLET BY MOUTH EVERY DAY 90 tablet 1    ELIQUIS 5 MG TABS tablet Take 1 tablet by mouth in the morning and 1 tablet before bedtime. 180 tablet 1    atorvastatin (LIPITOR) 40 MG tablet TAKE ONE TABLET BY MOUTH DAILY 90 tablet 1    metoprolol succinate (TOPROL XL) 25 MG extended release tablet Take 1 tablet by mouth in the morning. 90 tablet 1    levothyroxine (SYNTHROID) 88 MCG tablet Take 1 tablet by mouth in the morning. 90 tablet 1    empagliflozin (JARDIANCE) 25 MG tablet Take 1 tablet by mouth daily 90 tablet 3    Insulin Pen Needle (PEN NEEDLES) 32G X 6 MM MISC Take insulin daily 100 each 1    OXYGEN Inhale 2.5 L into the lungs nightly (Patient taking differently: Inhale 2.5 L into the lungs nightly Spouse states uses  5 L/min via nc bedtime) 1 Device 99    acetaminophen (TYLENOL) 500 MG tablet Take 1,000 mg by mouth daily as needed for Pain         OBJECTIVE:    VS:   /70 (Site: Left Upper Arm, Position: Sitting, Cuff Size: Large Adult)   Pulse 64   Temp 97.2 °F (36.2 °C) (Temporal)   Resp 18   Ht 5' 7\" (1.702 m)   Wt 236 lb 14.4 oz (107.5 kg)   SpO2 97%   BMI 37.10 kg/m²     EXAM:  Physical Exam  Vitals reviewed. Constitutional:       General: He is not in acute distress. Appearance: Normal appearance. He is morbidly obese. HENT:      Head: Normocephalic and atraumatic. Eyes:      Extraocular Movements: Extraocular movements intact. Pupils: Pupils are equal, round, and reactive to light.    Cardiovascular:      Rate and Rhythm: Normal rate. Rhythm irregular. Heart sounds: Normal heart sounds. Pulmonary:      Effort: Pulmonary effort is normal.      Breath sounds: Examination of the right-lower field reveals rales. Examination of the left-lower field reveals rales. Rales present. No wheezing or rhonchi. Abdominal:      General: Abdomen is protuberant. Bowel sounds are normal.      Palpations: There is no fluid wave. Musculoskeletal:      Cervical back: Neck supple. Right lower leg: 3+ Pitting Edema present. Left lower leg: 3+ Pitting Edema present. Skin:     Capillary Refill: Capillary refill takes less than 2 seconds. Neurological:      General: No focal deficit present. Mental Status: He is alert. Mental status is at baseline. Psychiatric:         Attention and Perception: Attention and perception normal.         Mood and Affect: Mood and affect normal.         Behavior: Behavior normal.         Thought Content: Thought content normal.         Cognition and Memory: Cognition is impaired. Memory is impaired. He exhibits impaired recent memory and impaired remote memory. Judgment: Judgment normal.       ASSESSMENT/PLAN:  I have reviewed all pertinent PMH, PSH, FH, SH, medications and allergies and updated history as appropriate. Diana Garcia was seen today for pain, arthritis and diabetes. Diagnoses and all orders for this visit:    Restrictive lung disease secondary to obesity  -     mometasone-formoterol (DULERA) 200-5 MCG/ACT inhaler; Inhale 2 puffs into the lungs 2 times daily Rinse mouth after using. PAP medication. Type 2 diabetes mellitus with diabetic autonomic neuropathy, with long-term current use of insulin (HCC)  -     insulin glargine (LANTUS SOLOSTAR) 100 UNIT/ML injection pen; Inject 55 Units into the skin nightly  -     Insulin Pen Needle (PEN NEEDLES) 31G X 6 MM MISC; Once daily. May substitute brand for insurance coverage.  -     Lancets MISC; Give Delica lancets.  Tests twice a day A.M. And P.M  -     blood glucose test strips (ONETOUCH VERIO) strip; TEST IN THE MORNING AND IN THE EVENING    Skin lesion of face  -     Day Ivan DO, Effie Shultz (ANKIT)    Hyperlipidemia, unspecified hyperlipidemia type  -     LIPID PANEL; Future    Hypothyroidism, unspecified type  -     TSH; Future  -     T4, Free; Future    Essential hypertension  -     Basic Metabolic Panel; Future  -     lisinopril (PRINIVIL;ZESTRIL) 5 MG tablet; Take 1 tablet by mouth daily    CKD stage G3a/A2, GFR 45-59 and albumin creatinine ratio  mg/g (HCC)  -     Basic Metabolic Panel; Future    Gout, unspecified cause, unspecified chronicity, unspecified site  -     Uric Acid; Future    Acute on chronic heart failure with preserved ejection fraction (HCC)  -     bumetanide (BUMEX) 2 MG tablet; Take 1 tablet by mouth daily  -     lisinopril (PRINIVIL;ZESTRIL) 5 MG tablet; Take 1 tablet by mouth daily    Diabetic polyneuropathy associated with type 2 diabetes mellitus (HCC)  -     gabapentin (NEURONTIN) 300 MG capsule;  Take 2 capsules by mouth in the morning and at bedtime for 90 days.    - He will take Bumex twice daily for the next 4 days, to follow up with CHF clinic in 2 weeks      RTC: 3 months       I have reviewed my findings and recommendations with Manfred Rocha and Dr Joaquin Watson MD PGY-2  11/16/2022 1:35 PM

## 2022-11-16 ENCOUNTER — SOCIAL WORK (OUTPATIENT)
Dept: INTERNAL MEDICINE | Age: 67
End: 2022-11-16

## 2022-11-16 ENCOUNTER — OFFICE VISIT (OUTPATIENT)
Dept: INTERNAL MEDICINE | Age: 67
End: 2022-11-16
Payer: MEDICARE

## 2022-11-16 VITALS
DIASTOLIC BLOOD PRESSURE: 70 MMHG | HEART RATE: 64 BPM | RESPIRATION RATE: 18 BRPM | OXYGEN SATURATION: 97 % | TEMPERATURE: 97.2 F | WEIGHT: 236.9 LBS | SYSTOLIC BLOOD PRESSURE: 133 MMHG | BODY MASS INDEX: 37.18 KG/M2 | HEIGHT: 67 IN

## 2022-11-16 DIAGNOSIS — E11.42 DIABETIC POLYNEUROPATHY ASSOCIATED WITH TYPE 2 DIABETES MELLITUS (HCC): ICD-10-CM

## 2022-11-16 DIAGNOSIS — E11.43 TYPE 2 DIABETES MELLITUS WITH DIABETIC AUTONOMIC NEUROPATHY, WITH LONG-TERM CURRENT USE OF INSULIN (HCC): ICD-10-CM

## 2022-11-16 DIAGNOSIS — E03.9 HYPOTHYROIDISM, UNSPECIFIED TYPE: ICD-10-CM

## 2022-11-16 DIAGNOSIS — I50.33 ACUTE ON CHRONIC HEART FAILURE WITH PRESERVED EJECTION FRACTION (HCC): ICD-10-CM

## 2022-11-16 DIAGNOSIS — I10 ESSENTIAL HYPERTENSION: ICD-10-CM

## 2022-11-16 DIAGNOSIS — M10.9 GOUT, UNSPECIFIED CAUSE, UNSPECIFIED CHRONICITY, UNSPECIFIED SITE: ICD-10-CM

## 2022-11-16 DIAGNOSIS — E78.5 HYPERLIPIDEMIA, UNSPECIFIED HYPERLIPIDEMIA TYPE: ICD-10-CM

## 2022-11-16 DIAGNOSIS — N18.31 CKD STAGE G3A/A2, GFR 45-59 AND ALBUMIN CREATININE RATIO 30-299 MG/G (HCC): ICD-10-CM

## 2022-11-16 DIAGNOSIS — J98.4 RESTRICTIVE LUNG DISEASE SECONDARY TO OBESITY: Primary | ICD-10-CM

## 2022-11-16 DIAGNOSIS — E66.9 RESTRICTIVE LUNG DISEASE SECONDARY TO OBESITY: Primary | ICD-10-CM

## 2022-11-16 DIAGNOSIS — Z79.4 TYPE 2 DIABETES MELLITUS WITH DIABETIC AUTONOMIC NEUROPATHY, WITH LONG-TERM CURRENT USE OF INSULIN (HCC): ICD-10-CM

## 2022-11-16 DIAGNOSIS — L98.9 SKIN LESION OF FACE: ICD-10-CM

## 2022-11-16 PROBLEM — E83.52 HYPERCALCEMIA: Status: ACTIVE | Noted: 2022-07-22

## 2022-11-16 PROBLEM — R80.9 PROTEINURIA DUE TO TYPE 2 DIABETES MELLITUS (HCC): Status: ACTIVE | Noted: 2022-11-16

## 2022-11-16 PROBLEM — N18.2 CHRONIC KIDNEY DISEASE, STAGE 2 (MILD): Status: ACTIVE | Noted: 2022-04-27

## 2022-11-16 PROBLEM — Z01.812 PRE-OPERATIVE LABORATORY EXAMINATION: Status: ACTIVE | Noted: 2022-11-16

## 2022-11-16 PROBLEM — E11.29 PROTEINURIA DUE TO TYPE 2 DIABETES MELLITUS (HCC): Status: ACTIVE | Noted: 2022-11-16

## 2022-11-16 LAB
ANION GAP SERPL CALCULATED.3IONS-SCNC: 14 MMOL/L (ref 7–16)
BUN BLDV-MCNC: 35 MG/DL (ref 6–23)
CALCIUM SERPL-MCNC: 9.3 MG/DL (ref 8.6–10.2)
CHLORIDE BLD-SCNC: 97 MMOL/L (ref 98–107)
CHOLESTEROL, TOTAL: 118 MG/DL (ref 0–199)
CO2: 28 MMOL/L (ref 22–29)
CREAT SERPL-MCNC: 1.3 MG/DL (ref 0.7–1.2)
GFR SERPL CREATININE-BSD FRML MDRD: 59 ML/MIN/1.73
GLUCOSE BLD-MCNC: 166 MG/DL (ref 74–99)
HDLC SERPL-MCNC: 23 MG/DL
LDL CHOLESTEROL CALCULATED: 60 MG/DL (ref 0–99)
POTASSIUM SERPL-SCNC: 4 MMOL/L (ref 3.5–5)
SODIUM BLD-SCNC: 139 MMOL/L (ref 132–146)
T4 FREE: 1.23 NG/DL (ref 0.93–1.7)
TRIGL SERPL-MCNC: 176 MG/DL (ref 0–149)
TSH SERPL DL<=0.05 MIU/L-ACNC: 7.36 UIU/ML (ref 0.27–4.2)
URIC ACID, SERUM: 7.6 MG/DL (ref 3.4–7)
VLDLC SERPL CALC-MCNC: 35 MG/DL

## 2022-11-16 PROCEDURE — 99213 OFFICE O/P EST LOW 20 MIN: CPT | Performed by: STUDENT IN AN ORGANIZED HEALTH CARE EDUCATION/TRAINING PROGRAM

## 2022-11-16 PROCEDURE — 3078F DIAST BP <80 MM HG: CPT | Performed by: STUDENT IN AN ORGANIZED HEALTH CARE EDUCATION/TRAINING PROGRAM

## 2022-11-16 PROCEDURE — 3074F SYST BP LT 130 MM HG: CPT | Performed by: STUDENT IN AN ORGANIZED HEALTH CARE EDUCATION/TRAINING PROGRAM

## 2022-11-16 PROCEDURE — 1124F ACP DISCUSS-NO DSCNMKR DOCD: CPT | Performed by: STUDENT IN AN ORGANIZED HEALTH CARE EDUCATION/TRAINING PROGRAM

## 2022-11-16 PROCEDURE — 3051F HG A1C>EQUAL 7.0%<8.0%: CPT | Performed by: STUDENT IN AN ORGANIZED HEALTH CARE EDUCATION/TRAINING PROGRAM

## 2022-11-16 PROCEDURE — 36415 COLL VENOUS BLD VENIPUNCTURE: CPT | Performed by: INTERNAL MEDICINE

## 2022-11-16 RX ORDER — PEN NEEDLE, DIABETIC 31 G X1/4"
NEEDLE, DISPOSABLE MISCELLANEOUS
Qty: 100 EACH | Refills: 3 | Status: SHIPPED | OUTPATIENT
Start: 2022-11-16

## 2022-11-16 RX ORDER — LISINOPRIL 5 MG/1
5 TABLET ORAL DAILY
Qty: 90 TABLET | Refills: 0 | Status: SHIPPED | OUTPATIENT
Start: 2022-11-16

## 2022-11-16 RX ORDER — INSULIN GLARGINE 100 [IU]/ML
55 INJECTION, SOLUTION SUBCUTANEOUS NIGHTLY
Qty: 17 ADJUSTABLE DOSE PRE-FILLED PEN SYRINGE | Refills: 2 | Status: SHIPPED
Start: 2022-11-16 | End: 2022-11-28 | Stop reason: ALTCHOICE

## 2022-11-16 RX ORDER — LANCETS 30 GAUGE
EACH MISCELLANEOUS
Qty: 200 EACH | Refills: 1 | Status: SHIPPED | OUTPATIENT
Start: 2022-11-16

## 2022-11-16 RX ORDER — GABAPENTIN 300 MG/1
600 CAPSULE ORAL 2 TIMES DAILY
Qty: 360 CAPSULE | Refills: 0 | Status: SHIPPED | OUTPATIENT
Start: 2022-11-16 | End: 2023-02-14

## 2022-11-16 RX ORDER — BLOOD SUGAR DIAGNOSTIC
STRIP MISCELLANEOUS
Qty: 200 EACH | Refills: 1 | Status: SHIPPED | OUTPATIENT
Start: 2022-11-16

## 2022-11-16 RX ORDER — BUMETANIDE 2 MG/1
2 TABLET ORAL DAILY
Qty: 30 TABLET | Refills: 3 | Status: SHIPPED | OUTPATIENT
Start: 2022-11-16

## 2022-11-16 ASSESSMENT — ENCOUNTER SYMPTOMS
SHORTNESS OF BREATH: 0
ABDOMINAL PAIN: 0
VOMITING: 0
DIARRHEA: 0
NAUSEA: 0
ABDOMINAL DISTENTION: 0
WHEEZING: 0
COUGH: 0
CONSTIPATION: 0
SORE THROAT: 0

## 2022-11-16 NOTE — PROGRESS NOTES
[facility]  [facility] Residency [facility]    Attending Physician Statement  I have discussed the case, including pertinent history and exam findings with the resident physician. I have seen and examined the patient and the key elements of the encounter have been performed by me. I agree with the assessment, plan and orders as documented by the resident. I have reviewed all pertinent PMHx, PSHx, FamHx, SocialHx, medications, and allergies and updated history as appropriate. Patient here for routine follow up of medical problems. Chronic systolic and diastolic HF  -patient currently hypervolemic; missed his CHF clinic appointment 2/2 not picking up the call   -NHYA Class II; increased edema in lung bases and BL LE 2+ pitting edema;   -plan to increased bumex to 2 mg BID for next 2 days and then continue at 2 mg daily   -discussed need to check BG and daily weights with patinet; patient next HF clinic appointment set up for 2 weeks from today     CKD IIIa  -patient having worsening Cr at 1.5; plan to have patient repeat BMP and if returned to normal then restart lisinopril at lower dosage 2/2 HF    IDDM2  -patient not checking blood glucose  -patient takes Lantus 50 U nightly instead of 55 U; and lispro 20 U TID;   -patient states that he is taking metformin but patinet does not know his medications; his wife coordinates his medications for him     Skin Lesions  -concerns for skin cancer 2/2 not healing for 2 years   -referral to dermatology   -located on left face and left mastoid     Remainder of medical problems as per resident note.     Jorge Osborne DO  11/16/2022 10:43 AM    Encounter time including independent chart review, discussion with patient, interpreting test results and/or external communications: 30'

## 2022-11-16 NOTE — PROGRESS NOTES
LAB Work was drawn per ordered and sent to lab    PAP medication Humalog pens were given after reviewing with

## 2022-11-16 NOTE — PROGRESS NOTES
Pt requested to see LSW to complete PAP; MARTY Denney Paci of PAP was already at office and completed with pt; signed out pt's Humalog and given to LPN  if dose change

## 2022-11-16 NOTE — PATIENT INSTRUCTIONS
Dear Kun Rocha,    Thank you for coming to your appointment today. I hope we have addressed all of your needs. Please make sure to do the following:  - Continue your medications as listed. - Please make sure to take an extra dose of Bumex at night for the next 4 days  - Make sure to get the COVID-19 booster at the pharmacy when able  - Referrals have been made to Dermatology:  If you do not hear from the office in 1 week, please call the number listed. - We will see each other again in 3 months    Call for a sooner appointment if you develop any new or worsening symptoms. Have a great day!     Sincerely,  Jose Ramon Pacheco M.D  11/16/2022  10:05 AM

## 2022-11-17 ENCOUNTER — TELEPHONE (OUTPATIENT)
Dept: CARE COORDINATION | Age: 67
End: 2022-11-17

## 2022-11-17 NOTE — TELEPHONE ENCOUNTER
[facility] called Yelitza Chance to follow up on the mailed AD packet. There was no answer.   VM left with [employer] information and request for a return call    [facility] to follow accordingly

## 2022-11-25 ENCOUNTER — CARE COORDINATION (OUTPATIENT)
Dept: CARE COORDINATION | Age: 67
End: 2022-11-25

## 2022-11-25 NOTE — CARE COORDINATION
Unable to contact after several attempts. No response to unable to reach letter. Will resolve episode and place in 90 day exclusion.

## 2022-11-28 ENCOUNTER — OFFICE VISIT (OUTPATIENT)
Dept: SURGERY | Age: 67
End: 2022-11-28
Payer: MEDICARE

## 2022-11-28 ENCOUNTER — TELEPHONE (OUTPATIENT)
Dept: INTERNAL MEDICINE | Age: 67
End: 2022-11-28

## 2022-11-28 VITALS
DIASTOLIC BLOOD PRESSURE: 54 MMHG | OXYGEN SATURATION: 98 % | HEART RATE: 97 BPM | RESPIRATION RATE: 16 BRPM | SYSTOLIC BLOOD PRESSURE: 104 MMHG | HEIGHT: 67 IN | WEIGHT: 242 LBS | BODY MASS INDEX: 37.98 KG/M2

## 2022-11-28 DIAGNOSIS — D49.2 SOFT TISSUE NEOPLASM: Primary | ICD-10-CM

## 2022-11-28 DIAGNOSIS — E11.43 TYPE 2 DIABETES MELLITUS WITH DIABETIC AUTONOMIC NEUROPATHY, WITHOUT LONG-TERM CURRENT USE OF INSULIN (HCC): Primary | ICD-10-CM

## 2022-11-28 PROCEDURE — 99024 POSTOP FOLLOW-UP VISIT: CPT | Performed by: SURGERY

## 2022-11-28 PROCEDURE — 99212 OFFICE O/P EST SF 10 MIN: CPT | Performed by: SURGERY

## 2022-11-28 RX ORDER — INSULIN GLARGINE 100 [IU]/ML
55 INJECTION, SOLUTION SUBCUTANEOUS NIGHTLY
Qty: 17 ADJUSTABLE DOSE PRE-FILLED PEN SYRINGE | Refills: 3
Start: 2022-11-28

## 2022-11-28 NOTE — PATIENT INSTRUCTIONS
Call [phone] for any questions/concerns. Shower daily with antibacterial soap. Keep clean and dry. Keep covered until healed.

## 2022-11-28 NOTE — TELEPHONE ENCOUNTER
Reviewed with Dr. Ata Fay. Pt eligible for Basaglar thru PAP program. Isidra Tristan to stop Lantus and change to [employer]ur[employer]ics. MAR updated.

## 2022-11-28 NOTE — PROGRESS NOTES
Pt here for follow-up from excision of back cyst from 11/8/22. Pt reports some ongoing drainage from area. Denies pain in area. States it is itchy. Some erythema surrounding the incision with minimal serous drainage. Path report reviewed with pt    S/p excision of back cyst--pt instructed on local wound care--needs to shower daily with antibacterial soap and wash over area. Keep covered until healed.   Return 2-4 weeks for re-eval.    Marie Briones MD, FACS  11/28/2022  12:53 PM

## 2022-11-30 ENCOUNTER — HOSPITAL ENCOUNTER (OUTPATIENT)
Dept: OTHER | Age: 67
Setting detail: THERAPIES SERIES
Discharge: HOME | End: 2022-11-30
Payer: MEDICARE

## 2022-11-30 ENCOUNTER — TELEPHONE (OUTPATIENT)
Dept: INTERNAL MEDICINE | Age: 67
End: 2022-11-30

## 2022-11-30 VITALS
WEIGHT: 239 LBS | HEART RATE: 64 BPM | RESPIRATION RATE: 18 BRPM | BODY MASS INDEX: 37.43 KG/M2 | DIASTOLIC BLOOD PRESSURE: 51 MMHG | OXYGEN SATURATION: 92 % | SYSTOLIC BLOOD PRESSURE: 103 MMHG

## 2022-11-30 DIAGNOSIS — N17.9 AKI (ACUTE KIDNEY INJURY) (HCC): Primary | ICD-10-CM

## 2022-11-30 LAB
ANION GAP SERPL CALCULATED.3IONS-SCNC: 13 MMOL/L (ref 7–16)
BUN BLDV-MCNC: 46 MG/DL (ref 6–23)
CALCIUM SERPL-MCNC: 9.3 MG/DL (ref 8.6–10.2)
CHLORIDE BLD-SCNC: 99 MMOL/L (ref 98–107)
CO2: 27 MMOL/L (ref 22–29)
CREAT SERPL-MCNC: 2.1 MG/DL (ref 0.7–1.2)
GFR SERPL CREATININE-BSD FRML MDRD: 33 ML/MIN/1.73
GLUCOSE BLD-MCNC: 95 MG/DL (ref 74–99)
POTASSIUM SERPL-SCNC: 4 MMOL/L (ref 3.5–5)
PRO-BNP: 1021 PG/ML (ref 0–125)
SODIUM BLD-SCNC: 139 MMOL/L (ref 132–146)

## 2022-11-30 PROCEDURE — 83880 ASSAY OF NATRIURETIC PEPTIDE: CPT

## 2022-11-30 PROCEDURE — 80048 BASIC METABOLIC PNL TOTAL CA: CPT

## 2022-11-30 PROCEDURE — 99214 OFFICE O/P EST MOD 30 MIN: CPT

## 2022-11-30 PROCEDURE — 36415 COLL VENOUS BLD VENIPUNCTURE: CPT

## 2022-11-30 NOTE — TELEPHONE ENCOUNTER
Left patient to voicemail message to call the office for an appt in 1 -2 weeks per Dr Nichole Montenegro.

## 2022-11-30 NOTE — PROGRESS NOTES
Congestive Heart Failure [address]         Luis Harris   1955          Referring Provider: Edgardo Alexis  Primary Care Physician: [address]  Cardiologist: NOT ESTABLISHED YET/ EP DR. Ramiro Spivey  Nephrologist:  [id number] [facility]        History of Present Illness:     Luis Harris is a 69 y.o. male with a history of HFpEF, most recent EF 55-60%. Patient Story:    He does  have dyspnea with exertion, shortness of breath, or decline in overall functional capacity. He does have orthopnea, PND, nocturnal cough or hemoptysis. He does have abdominal distention or bloating, early satiety, anorexia/change in appetite. He does has a good urinary response to  oral diuretic. He does have  SLIGHT lower extremity edema. He denies lightheadedness, dizziness. He denies palpitations, syncope or near syncope. He does not complain of chest pain, pressure, discomfort. No Known Allergies        Prior to Visit Medications    Medication Sig Taking? Authorizing Provider   insulin glargine (BASAGLAR KWIKPEN) 100 UNIT/ML injection pen Inject 55 Units into the skin nightly PAP medication. Natividad Schwartz., DO   bumetanide (BUMEX) 2 MG tablet Take 1 tablet by mouth daily  Nghia Pham MD   gabapentin (NEURONTIN) 300 MG capsule Take 2 capsules by mouth in the morning and at bedtime for 90 days. Nghia Pham MD   mometasone-formoterol [facility]) 200-5 MCG/ACT inhaler Inhale 2 puffs into the lungs 2 times daily Rinse mouth after using. PAP medication. Nghia Pham MD   Insulin Pen Needle (PEN NEEDLES) 31G X 6 MM MISC Once daily. May substitute brand for insurance coverage. Nghia Pham MD   Lancets MISC Give Delica lancets. Tests twice a day A.M.  And P.M  Nghia Pham MD   blood glucose test strips (ONETOUCH VERIO) strip TEST IN THE MORNING AND IN THE EVENING  Nghia Pham MD   lisinopril (PRINIVIL;ZESTRIL) 5 MG tablet Take 1 tablet by mouth daily  Minerva Mendez MD   insulin lispro, 1 Unit Dial, (HUMALOG KWIKPEN) 100 UNIT/ML SOPN Inject 20 Units into the skin 3 times daily (before meals) for 225 doses  Ozzie Gupta DO   ferrous sulfate (IRON 325) 325 (65 Fe) MG tablet Take 325 mg by mouth daily (with breakfast)  Historical Provider, MD   oxyCODONE-acetaminophen (PERCOCET) 7.5-325 MG per tablet Take 1 tablet by mouth 2 times daily as needed. Historical Provider, MD   allopurinol (ZYLOPRIM) 100 MG tablet Take 100 mg by mouth daily  Historical Provider, MD   Cholecalciferol (VITAMIN D) 50 MCG (2000 UT) CAPS capsule Take 2,000 Units by mouth daily  Historical Provider, MD   metFORMIN (GLUCOPHAGE) 500 MG tablet Take 1 tablet by mouth 2 times daily (with meals)  Fatimah Patrick MD   aspirin (ASPIR-LOW) 81 MG EC tablet TAKE ONE TABLET BY MOUTH EVERY DAY  Fatimah Patrick MD   zoster recombinant adjuvanted vaccine [facility]) 50 MCG/0.5ML SUSR injection 1 dose now and repeat in 2-6 months  Fatimah Patrick MD   ELIQUIS 5 MG TABS tablet Take 1 tablet by mouth in the morning and 1 tablet before bedtime. Glenn Arenas MD   atorvastatin (LIPITOR) 40 MG tablet TAKE ONE TABLET BY MOUTH DAILY  Glenn Arenas MD   metoprolol succinate (TOPROL XL) 25 MG extended release tablet Take 1 tablet by mouth in the morning. Glenn Arenas MD   levothyroxine (SYNTHROID) 88 MCG tablet Take 1 tablet by mouth in the morning. Glenn Arenas MD   empagliflozin (JARDIANCE) 25 MG tablet Take 1 tablet by mouth daily  Emil Sewell DO   Insulin Pen Needle (PEN NEEDLES) 32G X 6 MM MISC Take insulin daily  Minerva Mendez MD   Misc. Devices MISC Please add portability to current O2 order. Resp to evaluate patient for OCD device. Sendy Pereira MD   Misc. Devices KIT Dispense 1 blood pressure cuff.   Kya Claire MD   OXYGEN Inhale 2.5 L into the lungs nightly  Patient taking differently: Inhale 2.5 L into the lungs nightly Spouse states uses  5 L/min via nc bedtime  Babatunde Nice Kirk Brooks MD   acetaminophen (TYLENOL) 500 MG tablet Take 1,000 mg by mouth daily as needed for Pain  Historical Provider, MD           Guideline directed medical:  ARNI/ACE I/ARB: Yes  Beta blocker: Yes  Aldosterone antagonist:  No  SGLT2i: Yes      Physical Examination:     BP (!) 103/51   Pulse 64   Resp 18   Wt 239 lb (108.4 kg)   SpO2 92%   BMI 37.43 kg/m²     Assessment  Charting Type: Shift assessment              HEENT (Head, Ears, Eyes, Nose, & Throat)  HEENT (WDL): Within Defined Limits    Respiratory  Respiratory Pattern: Regular  Respiratory Depth: Normal  Respiratory Quality/Effort: Dyspnea with exertion  Chest Assessment: Chest expansion symmetrical  L Breath Sounds: Clear, Diminished  R Breath Sounds: Clear, Fine Crackles, Diminished    Breath Sounds  Right Lower Lobe: Crackles         Cardiac  Cardiac Rhythm: Ventricular paced    Rhythm Interpretation  Heart Rate: 64         Gastrointestinal  Abdominal (WDL): Exceptions to WDL  Abdomen Inspection: Soft, Rounded  RUQ Bowel Sounds: Active  LUQ Bowel Sounds: Active  RLQ Bowel Sounds: Active  LLQ Bowel Sounds: Active          Bowel Sounds  RUQ Bowel Sounds: Active  LUQ Bowel Sounds: Active  RLQ Bowel Sounds: Active  LLQ Bowel Sounds:  Active    Peripheral Vascular  Peripheral Vascular (WDL): Exceptions to WDL  Edema: Left lower extremity, Right lower extremity  RLE Edema: Trace, Pitting  LLE Edema: Trace, Pitting                                                 Heart Rate: 64                     LAB DATA:    Last 3 BMP      Sodium (mmol/L)   Date Value   11/16/2022 139   10/19/2022 139   10/03/2022 141     Potassium (mmol/L)   Date Value   11/16/2022 4.0   10/19/2022 3.9   10/03/2022 3.9     Potassium reflex Magnesium (mmol/L)   Date Value   08/23/2022 4.3   08/22/2022 4.4   03/10/2022 5.0     Chloride (mmol/L)   Date Value   11/16/2022 97 (L)   10/19/2022 97 (L)   10/03/2022 98     CO2 (mmol/L)   Date Value   11/16/2022 28   10/19/2022 27   10/03/2022 30 (H)     BUN (mg/dL)   Date Value   11/16/2022 35 (H)   10/19/2022 39 (H)   10/03/2022 30 (H)     Glucose (mg/dL)   Date Value   11/16/2022 166 (H)   10/19/2022 191 (H)   10/03/2022 159 (H)   04/18/2012 133 (H)   10/25/2011 138 (H)   01/28/2011 162 (H)     Calcium (mg/dL)   Date Value   11/16/2022 9.3   10/19/2022 9.8   10/03/2022 9.4       Last 3 BNP       Pro-BNP (pg/mL)   Date Value   10/19/2022 848 (H)   10/03/2022 1,330 (H)   09/21/2022 1,444 (H)          CBC: No results for input(s): WBC, HGB, PLT in the last 72 hours. BMP:    No results for input(s): NA, K, CL, CO2, BUN, CREATININE, GLUCOSE in the last 72 hours. Hepatic: No results for input(s): AST, ALT, ALB, BILITOT, ALKPHOS in the last 72 hours. Troponin: No results for input(s): TROPONINI in the last 72 hours. BNP: No results for input(s): BNP in the last 72 hours. Lipids: No results for input(s): CHOL, HDL in the last 72 hours. Invalid input(s): LDLCALCU  INR: No results for input(s): INR in the last 72 hours. WEIGHTS:    Wt Readings from Last 3 Encounters:   11/30/22 239 lb (108.4 kg)   11/28/22 242 lb (109.8 kg)   11/16/22 236 lb 14.4 oz (107.5 kg)         TELEMETRY:  Cardiac Regularity: Regular  Cardiac Rhythm/Interpretation: VPACED        ASSESSMENT:  Jean Rocha's weight is stable from last visit on 10-19-22. States he's feeling his usual self.      Interventions completed this visit:  IV diuretics given NO  Lab work obtained yes,  BMP/BNP  Reviewed currently prescribed medications with patient, educated on importance of compliance and answered any questions regarding their medication  Educated on signs and symptoms of HF  Educated on low sodium diet    PLAN:  Scheduled to follow up in CHF clinic on   Future Appointments   Date Time Provider Stacie Mtz   12/19/2022  3:00 PM Tico Boone MD [facility]   1/4/2023 10:00 AM [facility] CHF ROOM 1 [facility] CHF Jerry Quezada   2/2/2023  9:00 AM Marquis Brunson MD [facility] Given clinic phone number and aware of signs and symptoms to call with any HF change in symptoms. AVS Sent at home. Asked patients wife to review medications and call with any discrepancies.

## 2022-11-30 NOTE — TELEPHONE ENCOUNTER
Received call from [employer] @ CHF clinic. Patient was there today for a routine follow up and his BUN and Creatinine have increased since last visit. [employer] wanted our office to look at his labs.

## 2022-11-30 NOTE — PROGRESS NOTES
Marzena at [facility] notified of today's rise in bun/cr. 46/2.1  She will pass info onto Dr Alanis Waldrop.

## 2022-11-30 NOTE — TELEPHONE ENCOUNTER
Addressed on another encounter today.        Nurse Rylee Ku Note    Left patient to voicemail message to call the office for an appt in 1 -2 weeks per Dr Fernanda Linares

## 2022-11-30 NOTE — TELEPHONE ENCOUNTER
Called and spoke with patient. Patient has been urinating as he normally does and has not had any changes in urinary frequency or color. Patient has very little edema per the patient and the patient's wife. Patient does not know his medications but rather the patient's wife gives him his medications. Patient has been taking Bumex 2 mg every day. Discussed that we would change it to every other day 2/2 no signs of fluid overload and that we would continue his lisinopril 5 mg for blood pressure and his SGLT2 inhibitor. Current dosage indicated for GFR >30. Referral to his nephrologist ordered. BMP in 1 week with current changes made to medications and office followup with this office.       Electronically signed by Zahra Gordon DO on 11/30/2022 at 5:18 PM

## 2022-12-16 PROBLEM — Z01.812 PRE-OPERATIVE LABORATORY EXAMINATION: Status: RESOLVED | Noted: 2022-11-16 | Resolved: 2022-12-16

## 2022-12-19 ENCOUNTER — OFFICE VISIT (OUTPATIENT)
Dept: SURGERY | Age: 67
End: 2022-12-19

## 2022-12-19 VITALS
WEIGHT: 239 LBS | HEIGHT: 67 IN | DIASTOLIC BLOOD PRESSURE: 66 MMHG | BODY MASS INDEX: 37.51 KG/M2 | RESPIRATION RATE: 16 BRPM | OXYGEN SATURATION: 91 % | HEART RATE: 88 BPM | SYSTOLIC BLOOD PRESSURE: 123 MMHG

## 2022-12-19 DIAGNOSIS — D49.2 SOFT TISSUE NEOPLASM: Primary | ICD-10-CM

## 2022-12-19 PROCEDURE — 99024 POSTOP FOLLOW-UP VISIT: CPT | Performed by: SURGERY

## 2022-12-19 NOTE — PROGRESS NOTES
Pt here for follow-up from excision of back cyst from 11/8/22. Pt was seen in office 11/28/22 and had some drainage from incision. Pt reports drainage in less. States he has been putting black salve on it. Incision is dehisced.   Steristrips applied and dry dressing applied    Pt instructed on local wound care; return 4 weeks for re-eval.    Ash Steele MD, FACS  12/19/2022  3:17 PM

## 2022-12-24 ENCOUNTER — HOSPITAL ENCOUNTER (INPATIENT)
Age: 67
LOS: 6 days | Discharge: NURSING, REHAB OR LONG-TERM CARE | End: 2022-12-30
Attending: EMERGENCY MEDICINE | Admitting: INTERNAL MEDICINE
Payer: MEDICARE

## 2022-12-24 ENCOUNTER — APPOINTMENT (OUTPATIENT)
Dept: CT IMAGING | Age: 67
End: 2022-12-24
Payer: MEDICARE

## 2022-12-24 ENCOUNTER — APPOINTMENT (OUTPATIENT)
Dept: GENERAL RADIOLOGY | Age: 67
End: 2022-12-24
Payer: MEDICARE

## 2022-12-24 DIAGNOSIS — N18.2 CHRONIC KIDNEY DISEASE, STAGE 2 (MILD): ICD-10-CM

## 2022-12-24 DIAGNOSIS — E87.5 HYPERKALEMIA: Primary | ICD-10-CM

## 2022-12-24 DIAGNOSIS — R41.82 ALTERED MENTAL STATUS, UNSPECIFIED ALTERED MENTAL STATUS TYPE: ICD-10-CM

## 2022-12-24 DIAGNOSIS — E86.0 DEHYDRATION: ICD-10-CM

## 2022-12-24 DIAGNOSIS — E66.01 CLASS 2 SEVERE OBESITY WITH SERIOUS COMORBIDITY IN ADULT, UNSPECIFIED BMI, UNSPECIFIED OBESITY TYPE (HCC): ICD-10-CM

## 2022-12-24 DIAGNOSIS — N18.32 STAGE 3B CHRONIC KIDNEY DISEASE (HCC): ICD-10-CM

## 2022-12-24 DIAGNOSIS — N17.9 AKI (ACUTE KIDNEY INJURY) (HCC): ICD-10-CM

## 2022-12-24 LAB
ALBUMIN SERPL-MCNC: 4.3 G/DL (ref 3.5–5.2)
ALP BLD-CCNC: 119 U/L (ref 40–129)
ALT SERPL-CCNC: 51 U/L (ref 0–40)
AMMONIA: 16 UMOL/L (ref 16–60)
ANION GAP SERPL CALCULATED.3IONS-SCNC: 13 MMOL/L (ref 7–16)
ANION GAP SERPL CALCULATED.3IONS-SCNC: 3 MMOL/L (ref 7–16)
AST SERPL-CCNC: 53 U/L (ref 0–39)
B.E.: -7 MMOL/L (ref -3–3)
BACTERIA: ABNORMAL /HPF
BASOPHILS ABSOLUTE: 0.09 E9/L (ref 0–0.2)
BASOPHILS RELATIVE PERCENT: 0.7 % (ref 0–2)
BILIRUB SERPL-MCNC: 0.7 MG/DL (ref 0–1.2)
BILIRUBIN URINE: ABNORMAL
BLOOD, URINE: NEGATIVE
BUN BLDV-MCNC: 103 MG/DL (ref 6–23)
BUN BLDV-MCNC: 107 MG/DL (ref 6–23)
CALCIUM SERPL-MCNC: 8.8 MG/DL (ref 8.6–10.2)
CALCIUM SERPL-MCNC: 9.4 MG/DL (ref 8.6–10.2)
CHLORIDE BLD-SCNC: 103 MMOL/L (ref 98–107)
CHLORIDE BLD-SCNC: 107 MMOL/L (ref 98–107)
CHLORIDE URINE RANDOM: 26 MMOL/L
CLARITY: CLEAR
CO2: 20 MMOL/L (ref 22–29)
CO2: 21 MMOL/L (ref 22–29)
COHB: 1.1 % (ref 0–1.5)
COLOR: YELLOW
CREAT SERPL-MCNC: 5.4 MG/DL (ref 0.7–1.2)
CREAT SERPL-MCNC: 5.9 MG/DL (ref 0.7–1.2)
CREATININE URINE: 246 MG/DL (ref 40–278)
CRITICAL: ABNORMAL
DATE ANALYZED: ABNORMAL
DATE OF COLLECTION: ABNORMAL
EOSINOPHILS ABSOLUTE: 0.02 E9/L (ref 0.05–0.5)
EOSINOPHILS RELATIVE PERCENT: 0.2 % (ref 0–6)
GFR SERPL CREATININE-BSD FRML MDRD: 10 ML/MIN/1.73
GFR SERPL CREATININE-BSD FRML MDRD: 11 ML/MIN/1.73
GFR SERPL CREATININE-BSD FRML MDRD: 19 ML/MIN/1.73
GLUCOSE BLD-MCNC: 247 MG/DL (ref 74–99)
GLUCOSE BLD-MCNC: 249 MG/DL (ref 74–99)
GLUCOSE BLD-MCNC: 303 MG/DL (ref 74–99)
GLUCOSE URINE: 100 MG/DL
HCO3: 21 MMOL/L (ref 22–26)
HCT VFR BLD CALC: 37.8 % (ref 37–54)
HEMOGLOBIN: 11.7 G/DL (ref 12.5–16.5)
HHB: 3.9 % (ref 0–5)
IMMATURE GRANULOCYTES #: 0.29 E9/L
IMMATURE GRANULOCYTES %: 2.3 % (ref 0–5)
INFLUENZA A: NOT DETECTED
INFLUENZA B: NOT DETECTED
INR BLD: 2.1
KETONES, URINE: NEGATIVE MG/DL
LAB: ABNORMAL
LACTIC ACID: 2.4 MMOL/L (ref 0.5–2.2)
LEUKOCYTE ESTERASE, URINE: NEGATIVE
LYMPHOCYTES ABSOLUTE: 1.43 E9/L (ref 1.5–4)
LYMPHOCYTES RELATIVE PERCENT: 11.4 % (ref 20–42)
Lab: ABNORMAL
MCH RBC QN AUTO: 29.3 PG (ref 26–35)
MCHC RBC AUTO-ENTMCNC: 31 % (ref 32–34.5)
MCV RBC AUTO: 94.5 FL (ref 80–99.9)
METER GLUCOSE: 281 MG/DL (ref 74–99)
METHB: 0.3 % (ref 0–1.5)
MODE: ABNORMAL
MONOCYTES ABSOLUTE: 1.35 E9/L (ref 0.1–0.95)
MONOCYTES RELATIVE PERCENT: 10.7 % (ref 2–12)
NEUTROPHILS ABSOLUTE: 9.38 E9/L (ref 1.8–7.3)
NEUTROPHILS RELATIVE PERCENT: 74.7 % (ref 43–80)
NITRITE, URINE: NEGATIVE
O2 CONTENT: 17 ML/DL
O2 SATURATION: 96 % (ref 92–98.5)
O2HB: 94.7 % (ref 94–97)
OPERATOR ID: 2577
OSMOLALITY URINE: 382 MOSM/KG (ref 300–900)
PATIENT TEMP: 37 C
PCO2: 52.7 MMHG (ref 35–45)
PDW BLD-RTO: 17.3 FL (ref 11.5–15)
PERFORMED ON: ABNORMAL
PH BLOOD GAS: 7.22 (ref 7.35–7.45)
PH UA: 5.5 (ref 5–9)
PLATELET # BLD: 262 E9/L (ref 130–450)
PMV BLD AUTO: 9.8 FL (ref 7–12)
PO2: 92.9 MMHG (ref 75–100)
POC CHLORIDE: 120 MMOL/L (ref 100–108)
POC CREATININE: 3.3 MG/DL (ref 0.7–1.2)
POC POTASSIUM: 4 MMOL/L (ref 3.5–5)
POC SODIUM: 146 MMOL/L (ref 132–146)
POTASSIUM SERPL-SCNC: 5.2 MMOL/L (ref 3.5–5)
POTASSIUM SERPL-SCNC: 6.4 MMOL/L (ref 3.5–5)
POTASSIUM SERPL-SCNC: 7.01 MMOL/L (ref 3.5–5)
POTASSIUM SERPL-SCNC: 7.5 MMOL/L (ref 3.5–5)
PROTEIN UA: 30 MG/DL
PROTHROMBIN TIME: 22.9 SEC (ref 9.3–12.4)
RBC # BLD: 4 E12/L (ref 3.8–5.8)
RBC UA: ABNORMAL /HPF (ref 0–2)
SARS-COV-2 RNA, RT PCR: NOT DETECTED
SODIUM BLD-SCNC: 130 MMOL/L (ref 132–146)
SODIUM BLD-SCNC: 137 MMOL/L (ref 132–146)
SODIUM URINE: 54 MMOL/L
SOURCE, BLOOD GAS: ABNORMAL
SPECIFIC GRAVITY UA: 1.02 (ref 1–1.03)
T4 FREE: 1.08 NG/DL (ref 0.93–1.7)
THB: 12.7 G/DL (ref 11.5–16.5)
TIME ANALYZED: 2007
TOTAL PROTEIN: 8.3 G/DL (ref 6.4–8.3)
TROPONIN, HIGH SENSITIVITY: 96 NG/L (ref 0–11)
TSH SERPL DL<=0.05 MIU/L-ACNC: 9.33 UIU/ML (ref 0.27–4.2)
UROBILINOGEN, URINE: 0.2 E.U./DL
WBC # BLD: 12.6 E9/L (ref 4.5–11.5)
WBC UA: ABNORMAL /HPF (ref 0–5)

## 2022-12-24 PROCEDURE — 82947 ASSAY GLUCOSE BLOOD QUANT: CPT

## 2022-12-24 PROCEDURE — 84132 ASSAY OF SERUM POTASSIUM: CPT

## 2022-12-24 PROCEDURE — 85610 PROTHROMBIN TIME: CPT

## 2022-12-24 PROCEDURE — 99285 EMERGENCY DEPT VISIT HI MDM: CPT

## 2022-12-24 PROCEDURE — 74176 CT ABD & PELVIS W/O CONTRAST: CPT

## 2022-12-24 PROCEDURE — 70450 CT HEAD/BRAIN W/O DYE: CPT | Performed by: RADIOLOGY

## 2022-12-24 PROCEDURE — 84443 ASSAY THYROID STIM HORMONE: CPT

## 2022-12-24 PROCEDURE — 87636 SARSCOV2 & INF A&B AMP PRB: CPT

## 2022-12-24 PROCEDURE — 82436 ASSAY OF URINE CHLORIDE: CPT

## 2022-12-24 PROCEDURE — 6360000002 HC RX W HCPCS

## 2022-12-24 PROCEDURE — 2580000003 HC RX 258

## 2022-12-24 PROCEDURE — 82565 ASSAY OF CREATININE: CPT

## 2022-12-24 PROCEDURE — 82570 ASSAY OF URINE CREATININE: CPT

## 2022-12-24 PROCEDURE — 83605 ASSAY OF LACTIC ACID: CPT

## 2022-12-24 PROCEDURE — 80053 COMPREHEN METABOLIC PANEL: CPT

## 2022-12-24 PROCEDURE — 80048 BASIC METABOLIC PNL TOTAL CA: CPT

## 2022-12-24 PROCEDURE — 85025 COMPLETE CBC W/AUTO DIFF WBC: CPT

## 2022-12-24 PROCEDURE — 82435 ASSAY OF BLOOD CHLORIDE: CPT

## 2022-12-24 PROCEDURE — 81001 URINALYSIS AUTO W/SCOPE: CPT

## 2022-12-24 PROCEDURE — 83935 ASSAY OF URINE OSMOLALITY: CPT

## 2022-12-24 PROCEDURE — 36415 COLL VENOUS BLD VENIPUNCTURE: CPT

## 2022-12-24 PROCEDURE — 84295 ASSAY OF SERUM SODIUM: CPT

## 2022-12-24 PROCEDURE — 96365 THER/PROPH/DIAG IV INF INIT: CPT

## 2022-12-24 PROCEDURE — 87088 URINE BACTERIA CULTURE: CPT

## 2022-12-24 PROCEDURE — 2500000003 HC RX 250 WO HCPCS

## 2022-12-24 PROCEDURE — 71045 X-RAY EXAM CHEST 1 VIEW: CPT

## 2022-12-24 PROCEDURE — 84300 ASSAY OF URINE SODIUM: CPT

## 2022-12-24 PROCEDURE — 84439 ASSAY OF FREE THYROXINE: CPT

## 2022-12-24 PROCEDURE — 74176 CT ABD & PELVIS W/O CONTRAST: CPT | Performed by: RADIOLOGY

## 2022-12-24 PROCEDURE — 70450 CT HEAD/BRAIN W/O DYE: CPT

## 2022-12-24 PROCEDURE — 93005 ELECTROCARDIOGRAM TRACING: CPT

## 2022-12-24 PROCEDURE — 84484 ASSAY OF TROPONIN QUANT: CPT

## 2022-12-24 PROCEDURE — 94660 CPAP INITIATION&MGMT: CPT

## 2022-12-24 PROCEDURE — 96374 THER/PROPH/DIAG INJ IV PUSH: CPT

## 2022-12-24 PROCEDURE — 82140 ASSAY OF AMMONIA: CPT

## 2022-12-24 PROCEDURE — 6370000000 HC RX 637 (ALT 250 FOR IP)

## 2022-12-24 PROCEDURE — 82962 GLUCOSE BLOOD TEST: CPT

## 2022-12-24 PROCEDURE — 82805 BLOOD GASES W/O2 SATURATION: CPT

## 2022-12-24 PROCEDURE — 2140000000 HC CCU INTERMEDIATE R&B

## 2022-12-24 PROCEDURE — 96375 TX/PRO/DX INJ NEW DRUG ADDON: CPT

## 2022-12-24 PROCEDURE — 51702 INSERT TEMP BLADDER CATH: CPT

## 2022-12-24 RX ORDER — INSULIN LISPRO 100 [IU]/ML
0-4 INJECTION, SOLUTION INTRAVENOUS; SUBCUTANEOUS NIGHTLY
Status: DISCONTINUED | OUTPATIENT
Start: 2022-12-24 | End: 2022-12-30 | Stop reason: HOSPADM

## 2022-12-24 RX ORDER — 0.9 % SODIUM CHLORIDE 0.9 %
1000 INTRAVENOUS SOLUTION INTRAVENOUS ONCE
Status: COMPLETED | OUTPATIENT
Start: 2022-12-24 | End: 2022-12-24

## 2022-12-24 RX ORDER — ALBUTEROL SULFATE 2.5 MG/3ML
2.5 SOLUTION RESPIRATORY (INHALATION) ONCE
Status: DISCONTINUED | OUTPATIENT
Start: 2022-12-24 | End: 2022-12-25

## 2022-12-24 RX ORDER — DEXTROSE MONOHYDRATE 100 MG/ML
INJECTION, SOLUTION INTRAVENOUS CONTINUOUS PRN
Status: DISCONTINUED | OUTPATIENT
Start: 2022-12-24 | End: 2022-12-25

## 2022-12-24 RX ORDER — SODIUM CHLORIDE 0.9 % (FLUSH) 0.9 %
5-40 SYRINGE (ML) INJECTION EVERY 12 HOURS SCHEDULED
Status: DISCONTINUED | OUTPATIENT
Start: 2022-12-24 | End: 2022-12-25

## 2022-12-24 RX ORDER — SODIUM CHLORIDE 0.9 % (FLUSH) 0.9 %
5-40 SYRINGE (ML) INJECTION PRN
Status: DISCONTINUED | OUTPATIENT
Start: 2022-12-24 | End: 2022-12-26 | Stop reason: SDUPTHER

## 2022-12-24 RX ORDER — ALBUTEROL SULFATE 2.5 MG/3ML
10 SOLUTION RESPIRATORY (INHALATION) ONCE
Status: COMPLETED | OUTPATIENT
Start: 2022-12-24 | End: 2022-12-24

## 2022-12-24 RX ORDER — POLYETHYLENE GLYCOL 3350 17 G/17G
17 POWDER, FOR SOLUTION ORAL DAILY PRN
Status: DISCONTINUED | OUTPATIENT
Start: 2022-12-24 | End: 2022-12-30 | Stop reason: HOSPADM

## 2022-12-24 RX ORDER — GABAPENTIN 300 MG/1
600 CAPSULE ORAL DAILY
Status: DISCONTINUED | OUTPATIENT
Start: 2022-12-25 | End: 2022-12-30 | Stop reason: HOSPADM

## 2022-12-24 RX ORDER — ONDANSETRON 4 MG/1
4 TABLET, ORALLY DISINTEGRATING ORAL EVERY 8 HOURS PRN
Status: DISCONTINUED | OUTPATIENT
Start: 2022-12-24 | End: 2022-12-30 | Stop reason: HOSPADM

## 2022-12-24 RX ORDER — ALBUTEROL SULFATE 2.5 MG/3ML
3 SOLUTION RESPIRATORY (INHALATION) ONCE
Status: DISCONTINUED | OUTPATIENT
Start: 2022-12-24 | End: 2022-12-25

## 2022-12-24 RX ORDER — ACETAMINOPHEN 325 MG/1
650 TABLET ORAL EVERY 6 HOURS PRN
Status: DISCONTINUED | OUTPATIENT
Start: 2022-12-24 | End: 2022-12-30 | Stop reason: HOSPADM

## 2022-12-24 RX ORDER — SODIUM CHLORIDE 9 MG/ML
INJECTION, SOLUTION INTRAVENOUS PRN
Status: DISCONTINUED | OUTPATIENT
Start: 2022-12-24 | End: 2022-12-26 | Stop reason: SDUPTHER

## 2022-12-24 RX ORDER — ONDANSETRON 2 MG/ML
4 INJECTION INTRAMUSCULAR; INTRAVENOUS EVERY 6 HOURS PRN
Status: DISCONTINUED | OUTPATIENT
Start: 2022-12-24 | End: 2022-12-30 | Stop reason: HOSPADM

## 2022-12-24 RX ORDER — INSULIN LISPRO 100 [IU]/ML
0-8 INJECTION, SOLUTION INTRAVENOUS; SUBCUTANEOUS
Status: DISCONTINUED | OUTPATIENT
Start: 2022-12-25 | End: 2022-12-30 | Stop reason: HOSPADM

## 2022-12-24 RX ORDER — ACETAMINOPHEN 650 MG/1
650 SUPPOSITORY RECTAL EVERY 6 HOURS PRN
Status: DISCONTINUED | OUTPATIENT
Start: 2022-12-24 | End: 2022-12-30 | Stop reason: HOSPADM

## 2022-12-24 RX ORDER — SODIUM CHLORIDE 9 MG/ML
INJECTION, SOLUTION INTRAVENOUS CONTINUOUS
Status: DISCONTINUED | OUTPATIENT
Start: 2022-12-24 | End: 2022-12-25

## 2022-12-24 RX ADMIN — SODIUM CHLORIDE: 9 INJECTION, SOLUTION INTRAVENOUS at 20:51

## 2022-12-24 RX ADMIN — DEXTROSE MONOHYDRATE 250 ML: 100 INJECTION, SOLUTION INTRAVENOUS at 19:46

## 2022-12-24 RX ADMIN — CALCIUM GLUCONATE 1000 MG: 94 INJECTION, SOLUTION INTRAVENOUS at 19:23

## 2022-12-24 RX ADMIN — SODIUM CHLORIDE 1000 ML: 9 INJECTION, SOLUTION INTRAVENOUS at 20:47

## 2022-12-24 RX ADMIN — INSULIN HUMAN 10 UNITS: 100 INJECTION, SOLUTION PARENTERAL at 19:41

## 2022-12-24 RX ADMIN — Medication 50 MEQ: at 19:38

## 2022-12-24 RX ADMIN — Medication 1000 MG: at 20:02

## 2022-12-24 RX ADMIN — SODIUM CHLORIDE 1000 ML: 9 INJECTION, SOLUTION INTRAVENOUS at 20:46

## 2022-12-24 RX ADMIN — SODIUM CHLORIDE 1000 ML: 9 INJECTION, SOLUTION INTRAVENOUS at 19:22

## 2022-12-24 RX ADMIN — ALBUTEROL SULFATE 10 MG: 2.5 SOLUTION RESPIRATORY (INHALATION) at 19:30

## 2022-12-24 ASSESSMENT — ENCOUNTER SYMPTOMS
EYE ITCHING: 0
COLOR CHANGE: 0
ABDOMINAL PAIN: 0
ABDOMINAL DISTENTION: 0
BACK PAIN: 0
EYE DISCHARGE: 0
SHORTNESS OF BREATH: 0
COUGH: 0

## 2022-12-24 ASSESSMENT — PAIN - FUNCTIONAL ASSESSMENT: PAIN_FUNCTIONAL_ASSESSMENT: NONE - DENIES PAIN

## 2022-12-24 NOTE — ED NOTES
Patient arrives via EMS from home. Patients wife states that last night he was acting confused and he did not have much of an appetite. She left for work this morning at 0500 and he was asleep in his chair. When she returned at 1500 he was on the floor in front of the chair. Patient was incontinent of stool and urine and oriented to self only. Patient undressed in hospital gown and cleaned. Monitor and 12 lead shows paced rhythm. Call bell within reach, bed in low position, side rails up and wife at bedside. Pulse ox on room air was 84%. Oxygen at 6L per NC placed.      Promise Roberson RN  12/24/22 [address] [facility]  12/24/22 [id number]

## 2022-12-24 NOTE — Clinical Note
Discharge Plan[de-identified] Other/[facility])   Telemetry/Cardiac Monitoring Required?: Yes

## 2022-12-24 NOTE — ED PROVIDER NOTES
Luis Armando Arthur 69 y.o. male PHMx of type 2 diabetes, NIKO, hypothyroidism, essential hypertension, hyperlipidemia, CKD stage IIIb, normocytic anemia presents to the ED c/o altered mental status. Onset: Yesterday. Location/Radiation: Denies any specific location of pain. Duration: Patient's wife reports its been consistent now for over 24 hours. Characterization: AO x2, confused. Aggravating Factors: Wife denies. Relieving Factors: Denies. Severity: Moderate to severe. Patient's wife is at bedside and reports that patient has been acting himself for the past 24 hours. Then this morning she found him laying by his recliner and altered and not acting himself. Assx Sxs: Altered. She/He Denies: Patient denies any chest pain/palpitations, shortness of breath, abdominal pain, nausea/vomiting. Review of Systems   Constitutional:  Positive for activity change and appetite change. HENT:  Negative for congestion and dental problem. Eyes:  Negative for discharge and itching. Respiratory:  Negative for cough and shortness of breath. Cardiovascular:  Negative for chest pain and leg swelling. Gastrointestinal:  Negative for abdominal distention and abdominal pain. Endocrine: Negative for cold intolerance and heat intolerance. Genitourinary:  Negative for dysuria and enuresis. Musculoskeletal:  Negative for arthralgias and back pain. Skin:  Negative for color change and pallor. Allergic/Immunologic: Negative for environmental allergies and food allergies. Neurological:  Negative for dizziness and facial asymmetry. Hematological:  Negative for adenopathy. Does not bruise/bleed easily. Psychiatric/Behavioral:  Negative for agitation and behavioral problems. Physical Exam  Constitutional:       Appearance: He is obese. He is ill-appearing. He is not diaphoretic. HENT:      Head: Normocephalic and atraumatic.       Mouth/Throat:      Mouth: Mucous membranes are moist.      Pharynx: Oropharynx is clear. Eyes:      Extraocular Movements: Extraocular movements intact. Pupils: Pupils are equal, round, and reactive to light. Cardiovascular:      Rate and Rhythm: Normal rate and regular rhythm. Pulmonary:      Effort: Pulmonary effort is normal. No respiratory distress. Breath sounds: Normal breath sounds. No wheezing or rales. Musculoskeletal:         General: Normal range of motion. Cervical back: Normal range of motion and neck supple. Skin:     General: Skin is warm and dry. Capillary Refill: Capillary refill takes less than 2 seconds. Neurological:      Mental Status: He is alert. He is disoriented. GCS: GCS eye subscore is 4. GCS verbal subscore is 5. GCS motor subscore is 5. Psychiatric:         Mood and Affect: Mood normal. Mood is not anxious. Speech: Speech normal.         Behavior: Behavior normal. Behavior is not agitated. Procedures     MDM  Number of Diagnoses or Management Options  RADHA (acute kidney injury) ([address]  Altered mental status, unspecified altered mental status type  Class 2 severe obesity with serious comorbidity in adult, unspecified BMI, unspecified obesity type ([address]  Dehydration  Hyperkalemia  Diagnosis management comments: This is a 57-year-old male that presents to the ER from home via EMS with a chief complaint of altered mental status, patient's wife is bedside to give history and reports that he has been acting funny for the past couple days and worse today he was laying in his lounge chair and covered in urine. Upon initial evaluation physical exam of the patient he was AO x2, following commands, protecting his airway, no focal or gross deficits on neuro exam, he had 5 out of 5 motor strength in bilateral extremities. Had clear bilateral breath sounds, no wheezing/rales/rhonchi. Laboratory evaluation was concerning for hyperkalemia.   His initial potassium was 7.5, sodium normal, he also has a RADHA with a creatinine at 5.9, baseline is around 1.3. BUN elevated at 107. Patient also had hypersegmented T waves on EKG. He has a pacemaker in place. White blood cell count within acceptable limits, H&H within baseline. Patient was immediately started on hyperkalemia treatment. He was given 2 g of calcium, 10 units of insulin by IV, sodium bicarb and multiple albuterol treatments via the BiPAP. Noted peripheral IV was placed to give the patient fluids. Lasix was held due to the patient's RADHA. Prince catheter was placed and he had 300 urine output. Repeat check on potassium showed improvement to 5.2. CT head without contrast showed no acute intracranial abnormality, chest x-ray showed no acute cardiopulmonary process. Discussed case with nephrology and they agreed to consult the patient and recommend patient was stable for telemetry floor, discussed the case with primary team and they agreed admit the patient for further evaluation work-up. Patient stable and his mentation was improving at time of admission. ED Course as of 12/24/22 2328   Sat Dec 24, 2022   1743 EKG: This EKG is signed and interpreted by EP. Rate: 142  Rhythm: AV dual paced rhythm   Interpretation: wide QRS tachycardia, no st segment elevations/depressions  Comparison: stable as compared to patient's most recent EKG  [JR]   2054 Point-of-care chemistry shows potassium has decreased to 4.0 from 7.5. Ordering a formal BMP at this time. [JR]   2155 Formal BMP shows potassium now at 5.2. Discussed case with nephrology, Dr. Karlie Benton, he agreed to consult on the patient. He reports that patient is stable for telemetry bed. [JR]      ED Course User Index  [JR] Quan [place],          ED Course as of 12/24/22 2328   Sat Dec 24, 2022   1743 EKG: This EKG is signed and interpreted by EP.     Rate: 142  Rhythm: AV dual paced rhythm   Interpretation: wide QRS tachycardia, no st segment elevations/depressions  Comparison: stable as compared to patient's most recent EKG  [JR]   2054 Point-of-care chemistry shows potassium has decreased to 4.0 from 7.5. Ordering a formal BMP at this time. [JR]   2155 Formal BMP shows potassium now at 5.2. Discussed case with nephrology, Dr. Leonie Church, he agreed to consult on the patient. He reports that patient is stable for telemetry bed. [JR]      ED Course User Index  [JR] Samm Ivan, DO       --------------------------------------------- PAST HISTORY ---------------------------------------------  Past Medical History:  has a past medical history of RADHA (acute kidney injury) ([address] Atrial fibrillation ([address] Carpal tunnel syndrome, Encounter regarding vascular access for dialysis for ESRD ([address] Hyperlipidemia, Hypertension, Hypothyroidism, Lung nodule, Nocturnal hypoxemia due to obesity, Obesity, NIKO (obstructive sleep apnea), Osteoarthritis, Pinched nerve, Restrictive lung disease secondary to obesity, Sinus node dysfunction ([address] and Type II or unspecified type diabetes mellitus without mention of complication, not stated as uncontrolled. Past Surgical History:  has a past surgical history that includes Colonoscopy (2007); back surgery (2012); eye surgery; hernia repair; Colonoscopy (06/04/2012); back surgery (05/30/2017); pacemaker placement (10/03/2017); Rotator cuff repair (Right); Colonoscopy (04/26/2022); Colonoscopy (N/A, 4/26/2022); and back surgery (N/A, 11/8/2022). Social History:  reports that he quit smoking about 18 years ago. His smoking use included cigarettes. He has a 3.50 pack-year smoking history. He quit smokeless tobacco use about 18 years ago. He reports that he does not drink alcohol and does not use drugs. Family History: family history includes Amyotrophic lateral sclerosis in his father; Cancer in his brother and mother; Diabetes in his brother; Heart Disease in his mother; High Blood Pressure in his brother and father.      The patients home medications have been reviewed. Allergies: Patient has no known allergies.     -------------------------------------------------- RESULTS -------------------------------------------------    LABS:  Results for orders placed or performed during the hospital encounter of 12/24/22   COVID-19 & Influenza Combo    Specimen: Nasopharyngeal Swab   Result Value Ref Range    SARS-CoV-2 RNA, RT PCR NOT DETECTED NOT DETECTED    INFLUENZA A NOT DETECTED NOT DETECTED    INFLUENZA B NOT DETECTED NOT DETECTED   CMP   Result Value Ref Range    Sodium 137 132 - 146 mmol/L    Potassium 7.5 (HH) 3.5 - 5.0 mmol/L    Chloride 103 98 - 107 mmol/L    CO2 21 (L) 22 - 29 mmol/L    Anion Gap 13 7 - 16 mmol/L    Glucose 249 (H) 74 - 99 mg/dL     (HH) 6 - 23 mg/dL    Creatinine 5.9 (H) 0.7 - 1.2 mg/dL    Est, Glom Filt Rate 10 >=60 mL/min/1.73    Calcium 9.4 8.6 - 10.2 mg/dL    Total Protein 8.3 6.4 - 8.3 g/dL    Albumin 4.3 3.5 - 5.2 g/dL    Total Bilirubin 0.7 0.0 - 1.2 mg/dL    Alkaline Phosphatase 119 40 - 129 U/L    ALT 51 (H) 0 - 40 U/L    AST 53 (H) 0 - 39 U/L   CBC with Auto Differential   Result Value Ref Range    WBC 12.6 (H) 4.5 - 11.5 E9/L    RBC 4.00 3.80 - 5.80 E12/L    Hemoglobin 11.7 (L) 12.5 - 16.5 g/dL    Hematocrit 37.8 37.0 - 54.0 %    MCV 94.5 80.0 - 99.9 fL    MCH 29.3 26.0 - 35.0 pg    MCHC 31.0 (L) 32.0 - 34.5 %    RDW 17.3 (H) 11.5 - 15.0 fL    Platelets 781 435 - 071 E9/L    MPV 9.8 7.0 - 12.0 fL    Neutrophils % 74.7 43.0 - 80.0 %    Immature Granulocytes % 2.3 0.0 - 5.0 %    Lymphocytes % 11.4 (L) 20.0 - 42.0 %    Monocytes % 10.7 2.0 - 12.0 %    Eosinophils % 0.2 0.0 - 6.0 %    Basophils % 0.7 0.0 - 2.0 %    Neutrophils Absolute 9.38 (H) 1.80 - 7.30 E9/L    Immature Granulocytes # 0.29 E9/L    Lymphocytes Absolute 1.43 (L) 1.50 - 4.00 E9/L    Monocytes Absolute 1.35 (H) 0.10 - 0.95 E9/L    Eosinophils Absolute 0.02 (L) 0.05 - 0.50 E9/L    Basophils Absolute 0.09 0.00 - 0.20 E9/L   Protime-INR   Result Value Ref Range    Protime 22.9 (H) 9.3 - 12.4 sec    INR 2.1    TSH   Result Value Ref Range    TSH 9.330 (H) 0.270 - 4.200 uIU/mL   T4, Free   Result Value Ref Range    T4 Free 1.08 0.93 - 1.70 ng/dL   Troponin   Result Value Ref Range    Troponin, High Sensitivity 96 (H) 0 - 11 ng/L   Urinalysis   Result Value Ref Range    Color, UA Yellow Straw/Yellow    Clarity, UA Clear Clear    Glucose, Ur 100 (A) Negative mg/dL    Bilirubin Urine SMALL (A) Negative    Ketones, Urine Negative Negative mg/dL    Specific Gravity, UA 1.025 1.005 - 1.030    Blood, Urine Negative Negative    pH, UA 5.5 5.0 - 9.0    Protein, UA 30 (A) Negative mg/dL    Urobilinogen, Urine 0.2 <2.0 E.U./dL    Nitrite, Urine Negative Negative    Leukocyte Esterase, Urine Negative Negative   Ammonia   Result Value Ref Range    Ammonia 16.0 16.0 - 60.0 umol/L   Lactic Acid   Result Value Ref Range    Lactic Acid 2.4 (H) 0.5 - 2.2 mmol/L   Microscopic Urinalysis   Result Value Ref Range    WBC, UA 1-3 0 - 5 /HPF    RBC, UA 1-3 0 - 2 /HPF    Bacteria, UA FEW (A) None Seen /HPF   Blood Gas, Arterial   Result Value Ref Range    Date Analyzed 20221224     Time Analyzed 2007     Source: Blood Arterial     pH, Blood Gas 7.218 (L) 7.350 - 7.450    PCO2 52.7 (H) 35.0 - 45.0 mmHg    PO2 92.9 75.0 - 100.0 mmHg    HCO3 21.0 (L) 22.0 - 26.0 mmol/L    B.E. -7.0 (L) -3.0 - 3.0 mmol/L    O2 Sat 96.0 92.0 - 98.5 %    O2Hb 94.7 94.0 - 97.0 %    COHb 1.1 0.0 - 1.5 %    MetHb 0.3 0.0 - 1.5 %    O2 Content 17.0 mL/dL    HHb 3.9 0.0 - 5.0 %    tHb (est) 12.7 11.5 - 16.5 g/dL    Potassium 7.01 (HH) 3.50 - 5.00 mmol/L    Mode Aerosol 8l     Date Of Collection      Time Collected      Pt Temp 37.0 C     ID [id number]     Lab [id number]     Critical(s) Notified Handed report to Dr/RN    BMP   Result Value Ref Range    Sodium 130 (L) 132 - 146 mmol/L    Potassium 5.2 (H) 3.5 - 5.0 mmol/L    Chloride 107 98 - 107 mmol/L    CO2 20 (L) 22 - 29 mmol/L    Anion Gap 3 (L) 7 - 16 mmol/L    Glucose 303 (H) 74 - 99  mg/dL     (H) 6 - 23 mg/dL    Creatinine 5.4 (H) 0.7 - 1.2 mg/dL    Est, Glom Filt Rate 11 >=60 mL/min/1.73    Calcium 8.8 8.6 - 10.2 mg/dL   Osmolality, Urine   Result Value Ref Range    Osmolality, Ur 382 300 - 900 mOsm/kg   POCT Glucose   Result Value Ref Range    Meter Glucose 281 (H) 74 - 99 mg/dL   POCT Venous   Result Value Ref Range    POC Sodium 146 132 - 146 mmol/L    POC Potassium 4.0 3.5 - 5.0 mmol/L    POC Chloride 120 (H) 100 - 108 mmol/L    POC Glucose 247 (H) 74 - 99 mg/dl    POC Creatinine 3.3 (H) 0.7 - 1.2 mg/dL    Est, Glom Filt Rate 19 >=60 mL/min/1.73    Performed on SEE BELOW        RADIOLOGY:  CT HEAD WO CONTRAST   Final Result   No acute intracranial abnormality. Chronic parenchymal change including atrophy and old white matter ischemia. CT ABDOMEN PELVIS WO CONTRAST Additional Contrast? None   Final Result   Suspect mild acute pancreatitis. Mild splenomegaly. Bilateral lower lobe atelectasis. XR CHEST PORTABLE   Final Result   No acute process. US RETROPERITONEAL LIMITED    (Results Pending)           ------------------------- NURSING NOTES AND VITALS REVIEWED ---------------------------  Date / Time Roomed:  12/24/2022  5:19 PM  ED Bed Assignment:  19/19    The nursing notes within the ED encounter and vital signs as below have been reviewed.      Patient Vitals for the past 24 hrs:   BP Temp Pulse Resp SpO2 Weight   12/24/22 2130 (!) 93/49 -- 60 15 96 % --   12/24/22 2100 (!) 96/42 -- 60 17 97 % --   12/24/22 2030 (!) 94/51 -- 62 15 99 % --   12/24/22 1900 (!) 93/40 -- 60 16 97 % --   12/24/22 1832 (!) 99/54 -- 62 16 97 % --   12/24/22 1801 (!) 120/41 -- 65 18 96 % --   12/24/22 1732 (!) 98/58 (!) 96.5 °F (35.8 °C) 88 18 95 % 239 lb (108.4 kg)       Oxygen Saturation Interpretation: Normal    ------------------------------------------ PROGRESS NOTES ------------------------------------------  Re-evaluation(s):  Time: 2200  Patients symptoms are improving  Repeat physical examination is improved    Counseling:  I have spoken with the patient and discussed todays results, in addition to providing specific details for the plan of care and counseling regarding the diagnosis and prognosis. Their questions are answered at this time and they are agreeable with the plan of admission.    --------------------------------- ADDITIONAL PROVIDER NOTES ---------------------------------  Consultations:  Time: 2155. Spoke with Dr. Arnel Rios. Discussed case. They will admit the patient. Also spoke with Dr. Vi Marr, Nephrology, they agreed to consult. This patient's ED course included: a personal history and physicial examination, re-evaluation prior to disposition, multiple bedside re-evaluations, IV medications, cardiac monitoring, and continuous pulse oximetry    This patient has remained hemodynamically stable during their ED course. Diagnosis:  1. Hyperkalemia    2. Dehydration    3. Altered mental status, unspecified altered mental status type    4. RADHA (acute kidney injury) ([address]    5. Class 2 severe obesity with serious comorbidity in adult, unspecified BMI, unspecified obesity type ([address]        Disposition:  Patient's disposition: Admit to telemetry  Patient's condition is stable.         May Momin DO  Resident  12/24/22 [id number]

## 2022-12-25 ENCOUNTER — APPOINTMENT (OUTPATIENT)
Dept: ULTRASOUND IMAGING | Age: 67
End: 2022-12-25
Payer: MEDICARE

## 2022-12-25 LAB
ADENOVIRUS BY PCR: NOT DETECTED
ANION GAP SERPL CALCULATED.3IONS-SCNC: 13 MMOL/L (ref 7–16)
ANION GAP SERPL CALCULATED.3IONS-SCNC: 14 MMOL/L (ref 7–16)
ANION GAP SERPL CALCULATED.3IONS-SCNC: 15 MMOL/L (ref 7–16)
ANION GAP SERPL CALCULATED.3IONS-SCNC: 15 MMOL/L (ref 7–16)
ANION GAP SERPL CALCULATED.3IONS-SCNC: 16 MMOL/L (ref 7–16)
ANION GAP SERPL CALCULATED.3IONS-SCNC: 19 MMOL/L (ref 7–16)
B.E.: -6.5 MMOL/L (ref -3–3)
BACTERIA: ABNORMAL /HPF
BASOPHILS ABSOLUTE: 0.06 E9/L (ref 0–0.2)
BASOPHILS RELATIVE PERCENT: 0.6 % (ref 0–2)
BETA-HYDROXYBUTYRATE: 0.28 MMOL/L (ref 0.02–0.27)
BILIRUBIN URINE: NEGATIVE
BLOOD, URINE: ABNORMAL
BORDETELLA PARAPERTUSSIS BY PCR: NOT DETECTED
BORDETELLA PERTUSSIS BY PCR: NOT DETECTED
BUN BLDV-MCNC: 101 MG/DL (ref 6–23)
BUN BLDV-MCNC: 101 MG/DL (ref 6–23)
BUN BLDV-MCNC: 102 MG/DL (ref 6–23)
BUN BLDV-MCNC: 105 MG/DL (ref 6–23)
BUN BLDV-MCNC: 93 MG/DL (ref 6–23)
BUN BLDV-MCNC: 94 MG/DL (ref 6–23)
CALCIUM SERPL-MCNC: 10.1 MG/DL (ref 8.6–10.2)
CALCIUM SERPL-MCNC: 8.7 MG/DL (ref 8.6–10.2)
CALCIUM SERPL-MCNC: 8.8 MG/DL (ref 8.6–10.2)
CALCIUM SERPL-MCNC: 9.1 MG/DL (ref 8.6–10.2)
CALCIUM SERPL-MCNC: 9.2 MG/DL (ref 8.6–10.2)
CALCIUM SERPL-MCNC: 9.3 MG/DL (ref 8.6–10.2)
CHLAMYDOPHILIA PNEUMONIAE BY PCR: NOT DETECTED
CHLORIDE BLD-SCNC: 103 MMOL/L (ref 98–107)
CHLORIDE BLD-SCNC: 104 MMOL/L (ref 98–107)
CHLORIDE BLD-SCNC: 105 MMOL/L (ref 98–107)
CHLORIDE BLD-SCNC: 106 MMOL/L (ref 98–107)
CHLORIDE BLD-SCNC: 107 MMOL/L (ref 98–107)
CHLORIDE BLD-SCNC: 108 MMOL/L (ref 98–107)
CHLORIDE URINE RANDOM: 58 MMOL/L
CHP ED QC CHECK: NORMAL
CLARITY: CLEAR
CO2: 18 MMOL/L (ref 22–29)
CO2: 20 MMOL/L (ref 22–29)
CO2: 21 MMOL/L (ref 22–29)
CO2: 24 MMOL/L (ref 22–29)
CO2: 24 MMOL/L (ref 22–29)
CO2: 26 MMOL/L (ref 22–29)
COHB: 0.7 % (ref 0–1.5)
COLOR: YELLOW
CORONAVIRUS 229E BY PCR: NOT DETECTED
CORONAVIRUS HKU1 BY PCR: NOT DETECTED
CORONAVIRUS NL63 BY PCR: NOT DETECTED
CORONAVIRUS OC43 BY PCR: NOT DETECTED
CREAT SERPL-MCNC: 3.3 MG/DL (ref 0.7–1.2)
CREAT SERPL-MCNC: 3.5 MG/DL (ref 0.7–1.2)
CREAT SERPL-MCNC: 3.9 MG/DL (ref 0.7–1.2)
CREAT SERPL-MCNC: 4.5 MG/DL (ref 0.7–1.2)
CREAT SERPL-MCNC: 4.7 MG/DL (ref 0.7–1.2)
CREAT SERPL-MCNC: 5.1 MG/DL (ref 0.7–1.2)
CREATININE URINE: 103 MG/DL (ref 40–278)
CREATININE URINE: 104 MG/DL (ref 40–278)
CRITICAL: ABNORMAL
DATE ANALYZED: ABNORMAL
DATE OF COLLECTION: ABNORMAL
EOSINOPHILS ABSOLUTE: 0.29 E9/L (ref 0.05–0.5)
EOSINOPHILS RELATIVE PERCENT: 2.8 % (ref 0–6)
FIO2: 40 %
GFR SERPL CREATININE-BSD FRML MDRD: 11 ML/MIN/1.73
GFR SERPL CREATININE-BSD FRML MDRD: 13 ML/MIN/1.73
GFR SERPL CREATININE-BSD FRML MDRD: 13 ML/MIN/1.73
GFR SERPL CREATININE-BSD FRML MDRD: 16 ML/MIN/1.73
GFR SERPL CREATININE-BSD FRML MDRD: 18 ML/MIN/1.73
GFR SERPL CREATININE-BSD FRML MDRD: 19 ML/MIN/1.73
GLUCOSE BLD-MCNC: 197 MG/DL (ref 74–99)
GLUCOSE BLD-MCNC: 206 MG/DL (ref 74–99)
GLUCOSE BLD-MCNC: 207 MG/DL (ref 74–99)
GLUCOSE BLD-MCNC: 215 MG/DL (ref 74–99)
GLUCOSE BLD-MCNC: 221 MG/DL
GLUCOSE BLD-MCNC: 304 MG/DL (ref 74–99)
GLUCOSE BLD-MCNC: 308 MG/DL (ref 74–99)
GLUCOSE URINE: 500 MG/DL
HCO3: 21.6 MMOL/L (ref 22–26)
HCT VFR BLD CALC: 35.9 % (ref 37–54)
HEMOGLOBIN: 10.8 G/DL (ref 12.5–16.5)
HHB: 16.5 % (ref 0–5)
HUMAN METAPNEUMOVIRUS BY PCR: NOT DETECTED
HUMAN RHINOVIRUS/ENTEROVIRUS BY PCR: NOT DETECTED
IMMATURE GRANULOCYTES #: 0.16 E9/L
IMMATURE GRANULOCYTES %: 1.6 % (ref 0–5)
INFLUENZA A BY PCR: NOT DETECTED
INFLUENZA B BY PCR: NOT DETECTED
KETONES, URINE: NEGATIVE MG/DL
LAB: ABNORMAL
LACTIC ACID: 1.7 MMOL/L (ref 0.5–2.2)
LEUKOCYTE ESTERASE, URINE: ABNORMAL
LYMPHOCYTES ABSOLUTE: 1.3 E9/L (ref 1.5–4)
LYMPHOCYTES RELATIVE PERCENT: 12.7 % (ref 20–42)
Lab: ABNORMAL
MAGNESIUM: 2.3 MG/DL (ref 1.6–2.6)
MCH RBC QN AUTO: 29.3 PG (ref 26–35)
MCHC RBC AUTO-ENTMCNC: 30.1 % (ref 32–34.5)
MCV RBC AUTO: 97.6 FL (ref 80–99.9)
METER GLUCOSE: 181 MG/DL (ref 74–99)
METER GLUCOSE: 195 MG/DL (ref 74–99)
METER GLUCOSE: 201 MG/DL (ref 74–99)
METER GLUCOSE: 221 MG/DL (ref 74–99)
METER GLUCOSE: 250 MG/DL (ref 74–99)
METHB: 0.3 % (ref 0–1.5)
MICROALBUMIN UR-MCNC: 46.5 MG/L
MICROALBUMIN/CREAT UR-RTO: 44.7 (ref 0–30)
MODE: ABNORMAL
MONOCYTES ABSOLUTE: 0.95 E9/L (ref 0.1–0.95)
MONOCYTES RELATIVE PERCENT: 9.3 % (ref 2–12)
MYCOPLASMA PNEUMONIAE BY PCR: NOT DETECTED
NEUTROPHILS ABSOLUTE: 7.51 E9/L (ref 1.8–7.3)
NEUTROPHILS RELATIVE PERCENT: 73 % (ref 43–80)
NITRITE, URINE: NEGATIVE
O2 CONTENT: 13.4 ML/DL
O2 SATURATION: 83.3 % (ref 92–98.5)
O2HB: 82.5 % (ref 94–97)
OPERATOR ID: 1926
OSMOLALITY URINE: 426 MOSM/KG (ref 300–900)
PARAINFLUENZA VIRUS 1 BY PCR: NOT DETECTED
PARAINFLUENZA VIRUS 2 BY PCR: NOT DETECTED
PARAINFLUENZA VIRUS 3 BY PCR: NOT DETECTED
PARAINFLUENZA VIRUS 4 BY PCR: NOT DETECTED
PATIENT TEMP: 37 C
PCO2: 55.1 MMHG (ref 35–45)
PDW BLD-RTO: 17.3 FL (ref 11.5–15)
PEEP/CPAP: 6 CMH2O
PFO2: 1.31 MMHG/%
PH BLOOD GAS: 7.21 (ref 7.35–7.45)
PH UA: 5.5 (ref 5–9)
PHOSPHORUS: 7 MG/DL (ref 2.5–4.5)
PLATELET # BLD: 218 E9/L (ref 130–450)
PMV BLD AUTO: 9.9 FL (ref 7–12)
PO2: 52.4 MMHG (ref 75–100)
POTASSIUM REFLEX MAGNESIUM: 6.5 MMOL/L (ref 3.5–5)
POTASSIUM SERPL-SCNC: 5 MMOL/L (ref 3.5–5)
POTASSIUM SERPL-SCNC: 5.2 MMOL/L (ref 3.5–5)
POTASSIUM SERPL-SCNC: 5.76 MMOL/L (ref 3.5–5)
POTASSIUM SERPL-SCNC: 6.2 MMOL/L (ref 3.5–5)
POTASSIUM SERPL-SCNC: 6.5 MMOL/L (ref 3.5–5)
POTASSIUM SERPL-SCNC: 6.8 MMOL/L (ref 3.5–5)
POTASSIUM, UR: 31.5 MMOL/L
PRO-BNP: 6555 PG/ML (ref 0–125)
PROTEIN PROTEIN: 14 MG/DL (ref 0–12)
PROTEIN UA: NEGATIVE MG/DL
PROTEIN/CREAT RATIO: 0.1
PROTEIN/CREAT RATIO: 0.1 (ref 0–0.2)
PS: 15 CMH20
RBC # BLD: 3.68 E12/L (ref 3.8–5.8)
RBC UA: ABNORMAL /HPF (ref 0–2)
RESPIRATORY SYNCYTIAL VIRUS BY PCR: NOT DETECTED
SARS-COV-2, PCR: NOT DETECTED
SODIUM BLD-SCNC: 142 MMOL/L (ref 132–146)
SODIUM BLD-SCNC: 144 MMOL/L (ref 132–146)
SODIUM URINE: 75 MMOL/L
SOURCE, BLOOD GAS: ABNORMAL
SPECIFIC GRAVITY UA: 1.01 (ref 1–1.03)
THB: 11.5 G/DL (ref 11.5–16.5)
TIME ANALYZED: 743
UREA NITROGEN, UR: 516 MG/DL (ref 800–1666)
URIC ACID, SERUM: 9.6 MG/DL (ref 3.4–7)
UROBILINOGEN, URINE: 0.2 E.U./DL
WBC # BLD: 10.3 E9/L (ref 4.5–11.5)
WBC UA: ABNORMAL /HPF (ref 0–5)

## 2022-12-25 PROCEDURE — 2580000003 HC RX 258: Performed by: EMERGENCY MEDICINE

## 2022-12-25 PROCEDURE — 6370000000 HC RX 637 (ALT 250 FOR IP): Performed by: STUDENT IN AN ORGANIZED HEALTH CARE EDUCATION/TRAINING PROGRAM

## 2022-12-25 PROCEDURE — 94660 CPAP INITIATION&MGMT: CPT

## 2022-12-25 PROCEDURE — 6360000002 HC RX W HCPCS: Performed by: STUDENT IN AN ORGANIZED HEALTH CARE EDUCATION/TRAINING PROGRAM

## 2022-12-25 PROCEDURE — 82436 ASSAY OF URINE CHLORIDE: CPT

## 2022-12-25 PROCEDURE — 2580000003 HC RX 258: Performed by: LICENSED PRACTICAL NURSE

## 2022-12-25 PROCEDURE — 80048 BASIC METABOLIC PNL TOTAL CA: CPT

## 2022-12-25 PROCEDURE — 84100 ASSAY OF PHOSPHORUS: CPT

## 2022-12-25 PROCEDURE — 82962 GLUCOSE BLOOD TEST: CPT

## 2022-12-25 PROCEDURE — 94664 DEMO&/EVAL PT USE INHALER: CPT

## 2022-12-25 PROCEDURE — 82044 UR ALBUMIN SEMIQUANTITATIVE: CPT

## 2022-12-25 PROCEDURE — 83880 ASSAY OF NATRIURETIC PEPTIDE: CPT

## 2022-12-25 PROCEDURE — 81001 URINALYSIS AUTO W/SCOPE: CPT

## 2022-12-25 PROCEDURE — 6360000002 HC RX W HCPCS: Performed by: EMERGENCY MEDICINE

## 2022-12-25 PROCEDURE — 2500000003 HC RX 250 WO HCPCS: Performed by: STUDENT IN AN ORGANIZED HEALTH CARE EDUCATION/TRAINING PROGRAM

## 2022-12-25 PROCEDURE — 82570 ASSAY OF URINE CREATININE: CPT

## 2022-12-25 PROCEDURE — 2580000003 HC RX 258: Performed by: STUDENT IN AN ORGANIZED HEALTH CARE EDUCATION/TRAINING PROGRAM

## 2022-12-25 PROCEDURE — 83935 ASSAY OF URINE OSMOLALITY: CPT

## 2022-12-25 PROCEDURE — 2500000003 HC RX 250 WO HCPCS: Performed by: LICENSED PRACTICAL NURSE

## 2022-12-25 PROCEDURE — 6370000000 HC RX 637 (ALT 250 FOR IP): Performed by: FAMILY MEDICINE

## 2022-12-25 PROCEDURE — 36415 COLL VENOUS BLD VENIPUNCTURE: CPT

## 2022-12-25 PROCEDURE — 6360000002 HC RX W HCPCS: Performed by: FAMILY MEDICINE

## 2022-12-25 PROCEDURE — 2000000000 HC ICU R&B

## 2022-12-25 PROCEDURE — 76770 US EXAM ABDO BACK WALL COMP: CPT

## 2022-12-25 PROCEDURE — 6360000002 HC RX W HCPCS

## 2022-12-25 PROCEDURE — 0202U NFCT DS 22 TRGT SARS-COV-2: CPT

## 2022-12-25 PROCEDURE — 85025 COMPLETE CBC W/AUTO DIFF WBC: CPT

## 2022-12-25 PROCEDURE — 2580000003 HC RX 258: Performed by: FAMILY MEDICINE

## 2022-12-25 PROCEDURE — 6370000000 HC RX 637 (ALT 250 FOR IP): Performed by: LICENSED PRACTICAL NURSE

## 2022-12-25 PROCEDURE — 99231 SBSQ HOSP IP/OBS SF/LOW 25: CPT | Performed by: INTERNAL MEDICINE

## 2022-12-25 PROCEDURE — 84550 ASSAY OF BLOOD/URIC ACID: CPT

## 2022-12-25 PROCEDURE — 2580000003 HC RX 258

## 2022-12-25 PROCEDURE — 83735 ASSAY OF MAGNESIUM: CPT

## 2022-12-25 PROCEDURE — 83605 ASSAY OF LACTIC ACID: CPT

## 2022-12-25 PROCEDURE — 82010 KETONE BODYS QUAN: CPT

## 2022-12-25 PROCEDURE — 94640 AIRWAY INHALATION TREATMENT: CPT

## 2022-12-25 PROCEDURE — 82805 BLOOD GASES W/O2 SATURATION: CPT

## 2022-12-25 PROCEDURE — 2500000003 HC RX 250 WO HCPCS: Performed by: EMERGENCY MEDICINE

## 2022-12-25 PROCEDURE — 87040 BLOOD CULTURE FOR BACTERIA: CPT

## 2022-12-25 PROCEDURE — 84132 ASSAY OF SERUM POTASSIUM: CPT

## 2022-12-25 PROCEDURE — 84156 ASSAY OF PROTEIN URINE: CPT

## 2022-12-25 PROCEDURE — 84133 ASSAY OF URINE POTASSIUM: CPT

## 2022-12-25 PROCEDURE — 84540 ASSAY OF URINE/UREA-N: CPT

## 2022-12-25 PROCEDURE — 84300 ASSAY OF URINE SODIUM: CPT

## 2022-12-25 RX ORDER — INSULIN LISPRO 100 [IU]/ML
10 INJECTION, SOLUTION INTRAVENOUS; SUBCUTANEOUS ONCE
Status: COMPLETED | OUTPATIENT
Start: 2022-12-25 | End: 2022-12-25

## 2022-12-25 RX ORDER — METOPROLOL SUCCINATE 25 MG/1
25 TABLET, EXTENDED RELEASE ORAL DAILY
Status: DISCONTINUED | OUTPATIENT
Start: 2022-12-25 | End: 2022-12-30 | Stop reason: HOSPADM

## 2022-12-25 RX ORDER — LEVOTHYROXINE SODIUM 88 UG/1
88 TABLET ORAL DAILY
Status: DISCONTINUED | OUTPATIENT
Start: 2022-12-25 | End: 2022-12-30 | Stop reason: HOSPADM

## 2022-12-25 RX ORDER — ALBUTEROL SULFATE 2.5 MG/3ML
2.5 SOLUTION RESPIRATORY (INHALATION) EVERY 6 HOURS PRN
Status: DISCONTINUED | OUTPATIENT
Start: 2022-12-25 | End: 2022-12-30 | Stop reason: HOSPADM

## 2022-12-25 RX ORDER — BUDESONIDE 0.5 MG/2ML
0.5 INHALANT ORAL 2 TIMES DAILY
Status: DISCONTINUED | OUTPATIENT
Start: 2022-12-25 | End: 2022-12-25

## 2022-12-25 RX ORDER — BUMETANIDE 1 MG/1
2 TABLET ORAL DAILY
Status: DISCONTINUED | OUTPATIENT
Start: 2022-12-25 | End: 2022-12-26

## 2022-12-25 RX ORDER — CALCIUM GLUCONATE 94 MG/ML
1000 INJECTION, SOLUTION INTRAVENOUS ONCE
Status: COMPLETED | OUTPATIENT
Start: 2022-12-25 | End: 2022-12-25

## 2022-12-25 RX ORDER — CHOLECALCIFEROL (VITAMIN D3) 50 MCG
2000 TABLET ORAL DAILY
Status: DISCONTINUED | OUTPATIENT
Start: 2022-12-25 | End: 2022-12-30 | Stop reason: HOSPADM

## 2022-12-25 RX ORDER — INSULIN LISPRO 100 [IU]/ML
20 INJECTION, SOLUTION INTRAVENOUS; SUBCUTANEOUS
Status: DISCONTINUED | OUTPATIENT
Start: 2022-12-25 | End: 2022-12-25

## 2022-12-25 RX ORDER — INSULIN GLARGINE-YFGN 100 [IU]/ML
55 INJECTION, SOLUTION SUBCUTANEOUS NIGHTLY
Status: DISCONTINUED | OUTPATIENT
Start: 2022-12-25 | End: 2022-12-25

## 2022-12-25 RX ORDER — ARFORMOTEROL TARTRATE 15 UG/2ML
15 SOLUTION RESPIRATORY (INHALATION) 2 TIMES DAILY
Status: DISCONTINUED | OUTPATIENT
Start: 2022-12-25 | End: 2022-12-25

## 2022-12-25 RX ORDER — INSULIN GLARGINE-YFGN 100 [IU]/ML
20 INJECTION, SOLUTION SUBCUTANEOUS NIGHTLY
Status: DISCONTINUED | OUTPATIENT
Start: 2022-12-25 | End: 2022-12-27

## 2022-12-25 RX ORDER — DEXTROSE MONOHYDRATE 100 MG/ML
INJECTION, SOLUTION INTRAVENOUS CONTINUOUS PRN
Status: DISCONTINUED | OUTPATIENT
Start: 2022-12-25 | End: 2022-12-25 | Stop reason: SDUPTHER

## 2022-12-25 RX ORDER — ALLOPURINOL 100 MG/1
100 TABLET ORAL DAILY
Status: DISCONTINUED | OUTPATIENT
Start: 2022-12-25 | End: 2022-12-30 | Stop reason: HOSPADM

## 2022-12-25 RX ORDER — DEXTROSE MONOHYDRATE 100 MG/ML
INJECTION, SOLUTION INTRAVENOUS CONTINUOUS PRN
Status: DISCONTINUED | OUTPATIENT
Start: 2022-12-25 | End: 2022-12-30 | Stop reason: HOSPADM

## 2022-12-25 RX ORDER — OXYCODONE AND ACETAMINOPHEN 7.5; 325 MG/1; MG/1
1 TABLET ORAL 2 TIMES DAILY PRN
Status: DISCONTINUED | OUTPATIENT
Start: 2022-12-25 | End: 2022-12-25 | Stop reason: CLARIF

## 2022-12-25 RX ORDER — OXYCODONE HYDROCHLORIDE 5 MG/1
2.5 TABLET ORAL 2 TIMES DAILY PRN
Status: DISCONTINUED | OUTPATIENT
Start: 2022-12-25 | End: 2022-12-30 | Stop reason: HOSPADM

## 2022-12-25 RX ORDER — OXYCODONE HYDROCHLORIDE AND ACETAMINOPHEN 5; 325 MG/1; MG/1
1 TABLET ORAL 2 TIMES DAILY PRN
Status: DISCONTINUED | OUTPATIENT
Start: 2022-12-25 | End: 2022-12-30 | Stop reason: HOSPADM

## 2022-12-25 RX ORDER — FUROSEMIDE 10 MG/ML
40 INJECTION INTRAMUSCULAR; INTRAVENOUS ONCE
Status: DISCONTINUED | OUTPATIENT
Start: 2022-12-25 | End: 2022-12-25

## 2022-12-25 RX ORDER — LISINOPRIL 5 MG/1
5 TABLET ORAL DAILY
Status: DISCONTINUED | OUTPATIENT
Start: 2022-12-25 | End: 2022-12-30

## 2022-12-25 RX ORDER — ASPIRIN 81 MG/1
81 TABLET ORAL DAILY
Status: DISCONTINUED | OUTPATIENT
Start: 2022-12-25 | End: 2022-12-30 | Stop reason: HOSPADM

## 2022-12-25 RX ORDER — FERROUS SULFATE 325(65) MG
325 TABLET ORAL
Status: DISCONTINUED | OUTPATIENT
Start: 2022-12-25 | End: 2022-12-30 | Stop reason: HOSPADM

## 2022-12-25 RX ORDER — DEXTROSE MONOHYDRATE 25 G/50ML
25 INJECTION, SOLUTION INTRAVENOUS ONCE
Status: COMPLETED | OUTPATIENT
Start: 2022-12-25 | End: 2022-12-25

## 2022-12-25 RX ORDER — FUROSEMIDE 10 MG/ML
40 INJECTION INTRAMUSCULAR; INTRAVENOUS ONCE
Status: COMPLETED | OUTPATIENT
Start: 2022-12-25 | End: 2022-12-25

## 2022-12-25 RX ORDER — BUMETANIDE 0.25 MG/ML
2 INJECTION, SOLUTION INTRAMUSCULAR; INTRAVENOUS ONCE
Status: COMPLETED | OUTPATIENT
Start: 2022-12-25 | End: 2022-12-25

## 2022-12-25 RX ORDER — ATORVASTATIN CALCIUM 40 MG/1
40 TABLET, FILM COATED ORAL DAILY
Status: DISCONTINUED | OUTPATIENT
Start: 2022-12-25 | End: 2022-12-30 | Stop reason: HOSPADM

## 2022-12-25 RX ADMIN — CALCIUM GLUCONATE 1000 MG: 98 INJECTION, SOLUTION INTRAVENOUS at 11:55

## 2022-12-25 RX ADMIN — FERROUS SULFATE TAB 325 MG (65 MG ELEMENTAL FE) 325 MG: 325 (65 FE) TAB at 11:42

## 2022-12-25 RX ADMIN — BUDESONIDE 500 MCG: 0.5 SUSPENSION RESPIRATORY (INHALATION) at 06:36

## 2022-12-25 RX ADMIN — Medication 5 ML: at 02:20

## 2022-12-25 RX ADMIN — APIXABAN 5 MG: 5 TABLET, FILM COATED ORAL at 19:40

## 2022-12-25 RX ADMIN — INSULIN HUMAN 10 UNITS: 100 INJECTION, SOLUTION PARENTERAL at 11:51

## 2022-12-25 RX ADMIN — INSULIN LISPRO 10 UNITS: 100 INJECTION, SOLUTION INTRAVENOUS; SUBCUTANEOUS at 05:18

## 2022-12-25 RX ADMIN — ATORVASTATIN CALCIUM 40 MG: 40 TABLET, FILM COATED ORAL at 11:42

## 2022-12-25 RX ADMIN — CALCIUM GLUCONATE 1000 MG: 98 INJECTION, SOLUTION INTRAVENOUS at 05:28

## 2022-12-25 RX ADMIN — WATER 1000 MG: 1 INJECTION INTRAMUSCULAR; INTRAVENOUS; SUBCUTANEOUS at 21:22

## 2022-12-25 RX ADMIN — ASPIRIN 81 MG: 81 TABLET, COATED ORAL at 11:42

## 2022-12-25 RX ADMIN — SODIUM BICARBONATE: 84 INJECTION, SOLUTION INTRAVENOUS at 11:46

## 2022-12-25 RX ADMIN — SODIUM ZIRCONIUM CYCLOSILICATE 10 G: 10 POWDER, FOR SUSPENSION ORAL at 19:40

## 2022-12-25 RX ADMIN — SODIUM BICARBONATE 50 MEQ: 84 INJECTION, SOLUTION INTRAVENOUS at 05:22

## 2022-12-25 RX ADMIN — SODIUM CHLORIDE: 9 INJECTION, SOLUTION INTRAVENOUS at 05:59

## 2022-12-25 RX ADMIN — Medication 2000 UNITS: at 11:42

## 2022-12-25 RX ADMIN — SODIUM ZIRCONIUM CYCLOSILICATE 10 G: 10 POWDER, FOR SUSPENSION ORAL at 13:49

## 2022-12-25 RX ADMIN — APIXABAN 5 MG: 5 TABLET, FILM COATED ORAL at 02:09

## 2022-12-25 RX ADMIN — ARFORMOTEROL TARTRATE 15 MCG: 15 SOLUTION RESPIRATORY (INHALATION) at 06:36

## 2022-12-25 RX ADMIN — DEXTROSE MONOHYDRATE 25 G: 25 INJECTION, SOLUTION INTRAVENOUS at 11:52

## 2022-12-25 RX ADMIN — SODIUM ZIRCONIUM CYCLOSILICATE 10 G: 10 POWDER, FOR SUSPENSION ORAL at 05:29

## 2022-12-25 RX ADMIN — SODIUM ZIRCONIUM CYCLOSILICATE 10 G: 10 POWDER, FOR SUSPENSION ORAL at 11:41

## 2022-12-25 RX ADMIN — BUMETANIDE 0.2 MG/HR: 0.25 INJECTION INTRAMUSCULAR; INTRAVENOUS at 18:47

## 2022-12-25 RX ADMIN — BUMETANIDE 2 MG: 0.25 INJECTION, SOLUTION INTRAMUSCULAR; INTRAVENOUS at 18:40

## 2022-12-25 RX ADMIN — SODIUM CHLORIDE: 9 INJECTION, SOLUTION INTRAVENOUS at 08:46

## 2022-12-25 RX ADMIN — APIXABAN 5 MG: 5 TABLET, FILM COATED ORAL at 11:47

## 2022-12-25 RX ADMIN — FUROSEMIDE 40 MG: 10 INJECTION, SOLUTION INTRAMUSCULAR; INTRAVENOUS at 13:49

## 2022-12-25 RX ADMIN — DEXTROSE MONOHYDRATE 25 G: 25 INJECTION, SOLUTION INTRAVENOUS at 05:13

## 2022-12-25 RX ADMIN — SODIUM CHLORIDE, PRESERVATIVE FREE 10 ML: 5 INJECTION INTRAVENOUS at 11:43

## 2022-12-25 RX ADMIN — SODIUM BICARBONATE: 84 INJECTION, SOLUTION INTRAVENOUS at 22:56

## 2022-12-25 ASSESSMENT — PAIN SCALES - GENERAL
PAINLEVEL_OUTOF10: 0
PAINLEVEL_OUTOF10: 0

## 2022-12-25 NOTE — H&P
Hospital Medicine History & Physical      PCP: Jin Gonzalez MD    Date of Admission: 12/24/2022    Date of Service: Pt seen/examined on 12/24/22 and Admitted to Inpatient with expected LOS greater than two midnights due to medical therapy. Chief Complaint:  altered mental status      History Of Present Illness:    67-year-old male past medical history of atrial fibrillation on Eliquis, hypothyroidism, NIKO, obesity, sinus node dysfunction, CKD, type 2 diabetes, nocturnal oxygen dependent 2.5 L presented to ER due to altered mental status. He was brought in by his wife. She was found to have elevated potassium on presentation 7.5. Creatinine of 5.9 baseline creatinine of 1.3-1.5. Also metabolic acidosis. He was given hyperkalemic cocktail with sodium bicarb, insulin calcium gluconate albuterol. Potassium did come down to 5.2.  ER did discuss with nephrology recommended admission as they will be consulted on the medical floor. Started on normal saline at 125 cc an hour. Blood gases obtained in the ER with pH of 7.21, PCO2 of 52.8. Also he was hypotensive initially on presentation blood pressure of 98/58.     Past Medical History:          Diagnosis Date    RADHA (acute kidney injury) ([address] 3/10/2022    Atrial fibrillation ([address]     Carpal tunnel syndrome     Encounter regarding vascular access for dialysis for ESRD ([facility] 3/12/2022    Hyperlipidemia     Hypertension     Hypothyroidism     Lung nodule     Nocturnal hypoxemia due to obesity 8/8/2013    Obesity     NIKO (obstructive sleep apnea) 8/8/2013    [facility]    Osteoarthritis     Pinched nerve     Lumbar    Restrictive lung disease secondary to obesity 7/25/2016    Sinus node dysfunction (HCC)     Type II or unspecified type diabetes mellitus without mention of complication, not stated as uncontrolled        Past Surgical History:          Procedure Laterality Date    BACK SURGERY  2012    [facility]. Pinched nerve in back BACK SURGERY  05/30/2017    BACK SURGERY N/A 11/8/2022    EXCISON OF SOFT TISSUE NEOPLASM OF UPPER BACK performed by Amy Tineo MD at [address] [facility] [address] [place] [address]  2007    multiple colon polyps    COLONOSCOPY  06/04/2012    COLONOSCOPY    COLONOSCOPY  04/26/2022    polyp; diverticula--sarah    COLONOSCOPY N/A 4/26/2022    COLONOSCOPY POLYPECTOMY HOT BIOPSY (Snare) performed by Amy Tineo MD at [address] [facility]      lennie cataracts implant    HERNIA REPAIR      umbilical    PACEMAKER PLACEMENT  10/03/2017    Medtronic dual chamber    Dr. Boone Age Right        Medications Prior to Admission:      Prior to Admission medications    Medication Sig Start Date End Date Taking? Authorizing Provider   insulin glargine (BASAGLAR KWIKPEN) 100 UNIT/ML injection pen Inject 55 Units into the skin nightly PAP medication. 11/28/22   Edd Nichols, DO   bumetanide Jodean Erps) 2 MG tablet Take 1 tablet by mouth daily 11/16/22   Diana Connor MD   gabapentin (NEURONTIN) 300 MG capsule Take 2 capsules by mouth in the morning and at bedtime for 90 days. 11/16/22 2/14/23  Diana Connor MD   mometasone-formoterol [facility]) 200-5 MCG/ACT inhaler Inhale 2 puffs into the lungs 2 times daily Rinse mouth after using. PAP medication. 11/16/22   Whitney Posey MD   Insulin Pen Needle (PEN NEEDLES) 31G X 6 MM MISC Once daily. May substitute brand for insurance coverage. 11/16/22   Diana Connor MD   Lancets MISC Give Delica lancets. Tests twice a day A.M.  And P.M 11/16/22   Diana Connor MD   blood glucose test strips [facility] VER) strip TEST IN THE MORNING AND IN THE EVENING 11/16/22   Diana Connor MD   lisinopril (PRINIVIL;ZESTRIL) 5 MG tablet Take 1 tablet by mouth daily 11/16/22   Diana Connor MD   insulin lispro, 1 Unit Dial, ([place] [facility]) 100 UNIT/ML SOPN Inject 20 Units into the skin 3 times daily (before meals) for 225 doses 11/10/22 1/24/23  Clarke Hernandez DO   ferrous sulfate (IRON 325) 325 (65 Fe) MG tablet Take 325 mg by mouth daily (with breakfast)    Historical Provider, MD   oxyCODONE-acetaminophen (PERCOCET) 7.5-325 MG per tablet Take 1 tablet by mouth 2 times daily as needed. 10/3/22   Historical Provider, MD   allopurinol (ZYLOPRIM) 100 MG tablet Take 100 mg by mouth daily    Historical Provider, MD   Cholecalciferol (VITAMIN D) 50 MCG (2000 UT) CAPS capsule Take 2,000 Units by mouth daily    Historical Provider, MD   metFORMIN (GLUCOPHAGE) 500 MG tablet Take 1 tablet by mouth 2 times daily (with meals) 8/31/22   Sander Chaney MD   aspirin (ASPIR-LOW) 81 MG EC tablet TAKE ONE TABLET BY MOUTH EVERY DAY 8/31/22   Sander Chaney MD   zoster recombinant adjuvanted vaccine [facility]) 50 MCG/0.5ML SUSR injection 1 dose now and repeat in 2-6 months 8/31/22   Sander Chaney MD   ELIQUIS 5 MG TABS tablet Take 1 tablet by mouth in the morning and 1 tablet before bedtime. 7/18/22   Claudetta Rainbow, MD   atorvastatin (LIPITOR) 40 MG tablet TAKE ONE TABLET BY MOUTH DAILY 7/18/22   Claudetta Rainbow, MD   metoprolol succinate (TOPROL XL) 25 MG extended release tablet Take 1 tablet by mouth in the morning. 7/18/22   Claudetta Rainbow, MD   levothyroxine (SYNTHROID) 88 MCG tablet Take 1 tablet by mouth in the morning. 7/18/22 1/14/23  Claudetta Rainbow, MD   empagliflozin (JARDIANCE) 25 MG tablet Take 1 tablet by mouth daily 6/23/22 6/23/23  Mer Ledesma,    Insulin Pen Needle (PEN NEEDLES) 32G X 6 MM MISC Take insulin daily 2/28/22   Latrice Dudley MD   Misc. Devices MISC Please add portability to current O2 order. Resp to evaluate patient for OCD device. 9/15/21   Ivory Acevedo MD   Misc.  Devices KIT Dispense 1 blood pressure cuff. 2/23/21   Juan Sharma MD   OXYGEN Inhale 2.5 L into the lungs nightly  Patient taking differently: Inhale 2.5 L into the lungs nightly Spouse states uses  5 L/min via nc bedtime 7/28/20   Eunice Miles MD   acetaminophen (TYLENOL) 500 MG tablet Take 1,000 mg by mouth daily as needed for Pain    Historical Provider, MD       Allergies:  Patient has no known allergies. Social History:      The patient currently lives at home    TOBACCO:   reports that he quit smoking about 18 years ago. His smoking use included cigarettes. He has a 3.50 pack-year smoking history. He quit smokeless tobacco use about 18 years ago. ETOH:   reports no history of alcohol use. Family History:       Reviewed in detail and negative for DM, CAD, Cancer, CVA. Positive as follows:        Problem Relation Age of Onset    Cancer Mother         skin    Heart Disease Mother     High Blood Pressure Father     Amyotrophic lateral sclerosis Father     Cancer Brother     High Blood Pressure Brother     Diabetes Brother        REVIEW OF SYSTEMS:   Pertinent positives as noted in the HPI. All other systems reviewed and negative. PHYSICAL EXAM:    BP (!) 93/49   Pulse 60   Temp (!) 96.5 °F (35.8 °C)   Resp 15   Wt 239 lb (108.4 kg)   SpO2 96%   BMI 37.43 kg/m²     General appearance:  No apparent distress, appears stated age and cooperative. HEENT:  Normal cephalic, atraumatic without obvious deformity. Pupils equal, round, and reactive to light. Extra ocular muscles intact. Conjunctivae/corneas clear. Neck: Supple, with full range of motion. No jugular venous distention. Trachea midline. Respiratory:  Normal respiratory effort. Clear to auscultation, bilaterally without Rales/Wheezes/Rhonchi. Cardiovascular:  Regular rate and rhythm with normal S1/S2 without murmurs, rubs or gallops. Abdomen: Soft, non-tender, non-distended with normal bowel sounds. Musculoskeletal:  No clubbing, cyanosis or edema bilaterally. Full range of motion without deformity. Skin: Skin color, texture, turgor normal.  No rashes or lesions. Neurologic:  Neurovascularly intact without any focal sensory/motor deficits.  Cranial nerves: II-XII intact, grossly non-focal.  Psychiatric:  Alert and oriented person place and somewhat to time though he still a little bit confused. Insight and content not appropriate      Labs:     Recent Labs     12/24/22  1731   WBC 12.6*   HGB 11.7*   HCT 37.8        Recent Labs     12/24/22  1731 12/24/22 2007 12/24/22 2049 12/24/22 2059     --   --  130*   K 7.5* 7.01*  --  5.2*     --   --  107   CO2 21*  --   --  20*   *  --   --  103*   CREATININE 5.9*  --  3.3* 5.4*   CALCIUM 9.4  --   --  8.8     Recent Labs     12/24/22  1731   AST 53*   ALT 51*   BILITOT 0.7   ALKPHOS 119     Recent Labs     12/24/22  1731   INR 2.1     No results for input(s): [place] in the last 72 hours. Urinalysis:      Lab Results   Component Value Date/Time    NITRU Negative 12/24/2022 05:31 PM    [address] 12/24/2022 05:31 PM    BACTERIA FEW 12/24/2022 05:31 PM    RBCUA 1-3 12/24/2022 05:31 PM    BLOODU Negative 12/24/2022 05:31 PM    SPECGRAV 1.025 12/24/2022 05:31 PM    GLUCOSEU 100 12/24/2022 05:31 PM         ASSESSMENT:  Hyperkalemia  RADHA on CKD  Acute hypercarbic respiratory failure   Hypotension   Metabolic encephalopathy  Insulin-dependent diabetes  Chronic respiratory failure on 2.5 L oxygen at night  NIKO  Atrial fibrillation Eliquis      PLAN:    Continue IV fluids for now. Nephrology consulted. Monitor electrolytes and creatinine. We will diuretics and ACE inhibitor. Continue Eliquis for  A. fib. Metabolic encephalopathy likely because of confusion from elevated creatinine hypokalemia. Continue monitor closely. Urinalysis was not concerning for UTI. PT OT. Pulmonology consulted for hypercapnia. Continue BiPAP. DVT Prophylaxis: Eliquis  Diet: No diet orders on file  Code Status: Prior           Surya Wagner MD    Thank you Gume Marquez MD for the opportunity to be involved in this patient's care. If you have any questions or concerns please feel free to contact me through [address] [place] [address].

## 2022-12-25 NOTE — ED NOTES
[facility] faxed, patient placed in transport, attempted to update report x3 but no answer. Receiving RN can call ER if further report needed after chart review.      Deirdre Ruff RN  12/25/22 [id number]

## 2022-12-25 NOTE — PLAN OF CARE
Case discussed with Dr. Flora Berrios, repeat BMP still shows elevated K and BUN. Plan to start Bumex drip, possible dialysis if BMP shows no improvement. transfer patient to medical ICU. Discussed with Tanner Zhao.

## 2022-12-25 NOTE — CONSULTS
Department of Internal Medicine  Nephrology Nurse Practitioner Consult Note      Reason for Consult: RADHA, hyperkalemia  Requesting Physician: Dr. Vira Thomas: Altered mental status    History Obtained From:  patient, electronic medical record    HISTORY OF PRESENT ILLNESS: Briefly, Gualberto Urrutia is a 41-year-old male known to us, past medical history CKD stage 3B probably 2/2 nephrosclerosis and previous episode of ischemic ATN requiring temporary HD in May 2017, recurrent episode of ischemic ATN in March 2022 also requiring HD x2 with fair recovery of renal function, baseline creatinine 1.4 to 1.5 mg/dL, HTN, DM type II, A. fib on apixaban, HFpEF 55 to 60%, NIKO, pacemaker placement, hypothyroidism, hyperlipidemia, who presented to the ED on 12/24/2022 via EMS from home for altered mental status and decreased appetite. ED work-up found patient hypoxic, initial lab work revealed potassium level 7.5, creatinine 5.9 with , reasons for this consultation. Hyperkalemia protocol given in ED. Home medications include lisinopril, bumetanide, gabapentin, metformin, empagliflozin, metoprolol.     Past Medical History:        Diagnosis Date    RADHA (acute kidney injury) ([address] 3/10/2022    Atrial fibrillation ([facility]     Carpal tunnel syndrome     Encounter regarding vascular access for dialysis for ESRD ([facility] [address] 3/12/2022    Hyperlipidemia     Hypertension     Hypothyroidism     Lung nodule     Nocturnal hypoxemia due to obesity 8/8/2013    Obesity     NIKO (obstructive sleep apnea) 8/8/2013    [facility]    Osteoarthritis     Pinched nerve     Lumbar    Restrictive lung disease secondary to obesity 7/25/2016    Sinus node dysfunction (HCC)     Type II or unspecified type diabetes mellitus without mention of complication, not stated as uncontrolled      Past Surgical History:        Procedure Laterality Date    BACK SURGERY  2012    [facility]. Pinched nerve in back    BACK SURGERY  05/30/2017 BACK SURGERY N/A 11/8/2022    EXCISON OF SOFT TISSUE NEOPLASM OF UPPER BACK performed by Alma Delia Abernathy MD at [address]  2007    multiple colon polyps    COLONOSCOPY  06/04/2012    COLONOSCOPY    COLONOSCOPY  04/26/2022    polyp; diverticula--sarah    COLONOSCOPY N/A 4/26/2022    COLONOSCOPY POLYPECTOMY HOT BIOPSY (Snare) performed by Alma Delia Abernathy MD at [address]      lennie cataracts implant    HERNIA REPAIR      umbilical    PACEMAKER PLACEMENT  10/03/2017    Medtronic dual chamber    Dr. Brayden Toro Right      Current Medications:    Current Facility-Administered Medications: [Held by provider] allopurinol (ZYLOPRIM) tablet 100 mg, 100 mg, Oral, Daily  aspirin EC tablet 81 mg, 81 mg, Oral, Daily  atorvastatin (LIPITOR) tablet 40 mg, 40 mg, Oral, Daily  [Held by provider] bumetanide (BUMEX) tablet 2 mg, 2 mg, Oral, Daily  vitamin D (CHOLECALCIFEROL) tablet 2,000 Units, 2,000 Units, Oral, Daily  apixaban (ELIQUIS) tablet 5 mg, 5 mg, Oral, BID  ferrous sulfate (IRON 325) tablet 325 mg, 325 mg, Oral, Daily with breakfast  insulin lispro (HUMALOG) injection vial 20 Units, 20 Units, SubCUTAneous, TID AC  levothyroxine (SYNTHROID) tablet 88 mcg, 88 mcg, Oral, Daily  [Held by provider] lisinopril (PRINIVIL;ZESTRIL) tablet 5 mg, 5 mg, Oral, Daily  [Held by provider] metFORMIN (GLUCOPHAGE) tablet 500 mg, 500 mg, Oral, BID WC  [Held by provider] metoprolol succinate (TOPROL XL) extended release tablet 25 mg, 25 mg, Oral, Daily  budesonide (PULMICORT) nebulizer suspension 500 mcg, 0.5 mg, Nebulization, BID **AND** Arformoterol Tartrate (BROVANA) nebulizer solution 15 mcg, 15 mcg, Nebulization, BID  [Held by provider] oxyCODONE-acetaminophen (PERCOCET) 5-325 MG per tablet 1 tablet, 1 tablet, Oral, BID PRN **AND** [Held by provider] oxyCODONE (ROXICODONE) immediate release tablet 2.5 mg, 2.5 mg, Oral, BID PRN  glucose chewable tablet 16 g, 4 tablet, Oral, PRN  dextrose bolus 10% 125 mL, 125 mL, IntraVENous, PRN **OR** dextrose bolus 10% 250 mL, 250 mL, IntraVENous, PRN  glucagon (rDNA) injection 1 mg, 1 mg, SubCUTAneous, PRN  dextrose 10 % infusion, , IntraVENous, Continuous PRN  [Held by provider] insulin glargine-yfgn (SEMGLEE-YFGN) injection vial 20 Units, 20 Units, SubCUTAneous, Nightly  furosemide (LASIX) injection 40 mg, 40 mg, IntraVENous, Once  calcium gluconate 10 % injection 1,000 mg, 1,000 mg, IntraVENous, Once  sodium zirconium cyclosilicate (LOKELMA) oral suspension 10 g, 10 g, Oral, TID  sodium bicarbonate 150 mEq in dextrose 5 % 1,000 mL infusion, , IntraVENous, Continuous  albuterol (PROVENTIL) nebulizer solution 2.5 mg, 2.5 mg, Nebulization, Once  albuterol (PROVENTIL) nebulizer solution 3 mg, 3 mg, Nebulization, Once  [Held by provider] gabapentin (NEURONTIN) capsule 600 mg, 600 mg, Oral, Daily  insulin lispro (HUMALOG) injection vial 0-8 Units, 0-8 Units, SubCUTAneous, TID WC  insulin lispro (HUMALOG) injection vial 0-4 Units, 0-4 Units, SubCUTAneous, Nightly  [DISCONTINUED] dextrose bolus 10% 125 mL, 125 mL, IntraVENous, PRN **OR** dextrose bolus 10% 250 mL, 250 mL, IntraVENous, PRN  sodium chloride flush 0.9 % injection 5-40 mL, 5-40 mL, IntraVENous, PRN  0.9 % sodium chloride infusion, , IntraVENous, PRN  ondansetron (ZOFRAN-ODT) disintegrating tablet 4 mg, 4 mg, Oral, Q8H PRN **OR** ondansetron (ZOFRAN) injection 4 mg, 4 mg, IntraVENous, Q6H PRN  polyethylene glycol (GLYCOLAX) packet 17 g, 17 g, Oral, Daily PRN  acetaminophen (TYLENOL) tablet 650 mg, 650 mg, Oral, Q6H PRN **OR** acetaminophen (TYLENOL) suppository 650 mg, 650 mg, Rectal, Q6H PRN  Allergies:  Patient has no known allergies. Social History:    TOBACCO:   reports that he quit smoking about 18 years ago. His smoking use included cigarettes. He has a 3.50 pack-year smoking history. He quit smokeless tobacco use about 18 years ago. ETOH:   reports no history of alcohol use.   DRUGS:   reports no history of drug use.     Family History:       Problem Relation Age of Onset    Cancer Mother         skin    Heart Disease Mother     High Blood Pressure Father     Amyotrophic lateral sclerosis Father     Cancer Brother     High Blood Pressure Brother     Diabetes Brother      REVIEW OF SYSTEMS:    CONSTITUTIONAL:  positive for  anorexia  HEENT: Negative  RESPIRATORY: Negative  CARDIOVASCULAR: Negative  GASTROINTESTINAL:  negative  GENITOURINARY:  negative  HEMATOLOGIC/LYMPHATIC:  negative  ALLERGIC/IMMUNOLOGIC:  negative  MUSCULOSKELETAL:  negative  NEUROLOGICAL:  positive for AMS    PHYSICAL EXAM:      Vitals:    VITALS:  BP (!) 107/52   Pulse 62   Temp 98.1 °F (36.7 °C) (Oral)   Resp 22   Wt 239 lb (108.4 kg)   SpO2 99%   BMI 37.43 kg/m²   24HR INTAKE/OUTPUT:    Intake/Output Summary (Last 24 hours) at 12/25/2022 1154  Last data filed at 12/25/2022 0530  Gross per 24 hour   Intake --   Output 700 ml   Net -700 ml       Constitutional: Awake, NAD  HEENT: Atraumatic, normocephalic, PERRLA  Respiratory: CTA  Cardiovascular/Edema: RRR, normal S1, normal S2  Gastrointestinal: Soft, nondistended, nontender  Neurologic: Focal  Skin:, Dry, no rashes, no lesions      DATA:    CBC with Differential:    Lab Results   Component Value Date/Time    WBC 10.3 12/25/2022 05:54 AM    RBC 3.68 12/25/2022 05:54 AM    HGB 10.8 12/25/2022 05:54 AM    HCT 35.9 12/25/2022 05:54 AM     12/25/2022 05:54 AM    MCV 97.6 12/25/2022 05:54 AM    MCH 29.3 12/25/2022 05:54 AM    MCHC 30.1 12/25/2022 05:54 AM    RDW 17.3 12/25/2022 05:54 AM    SEGSPCT 68 04/27/2011 10:30 AM    LYMPHOPCT 12.7 12/25/2022 05:54 AM    MONOPCT 9.3 12/25/2022 05:54 AM    EOSPCT 7 10/05/2010 10:52 AM    BASOPCT 0.6 12/25/2022 05:54 AM    MONOSABS 0.95 12/25/2022 05:54 AM    LYMPHSABS 1.30 12/25/2022 05:54 AM    EOSABS 0.29 12/25/2022 05:54 AM    BASOSABS 0.06 12/25/2022 05:54 AM     CMP:    Lab Results   Component Value Date/Time     12/25/2022 09:08 AM    K 6.5 12/25/2022 09:08 AM    K 6.5 12/25/2022 05:54 AM     12/25/2022 09:08 AM    CO2 18 12/25/2022 09:08 AM     12/25/2022 09:08 AM    CREATININE 4.5 12/25/2022 09:08 AM    GFRAA >60 10/03/2022 10:55 AM    LABGLOM 13 12/25/2022 09:08 AM    GLUCOSE 206 12/25/2022 09:08 AM    GLUCOSE 133 04/18/2012 08:43 AM    PROT 8.3 12/24/2022 05:31 PM    LABALBU 4.3 12/24/2022 05:31 PM    LABALBU 4.4 10/25/2011 09:50 AM    CALCIUM 8.8 12/25/2022 09:08 AM    BILITOT 0.7 12/24/2022 05:31 PM    ALKPHOS 119 12/24/2022 05:31 PM    AST 53 12/24/2022 05:31 PM    ALT 51 12/24/2022 05:31 PM     Magnesium:    Lab Results   Component Value Date/Time    MG 2.3 12/25/2022 09:08 AM     Phosphorus:    Lab Results   Component Value Date/Time    PHOS 7.0 12/25/2022 09:08 AM     Radiology Review:      CT abdomen and pelvis 12/24/2022   Suspect mild acute pancreatitis. Mild splenomegaly. Bilateral lower lobe atelectasis. CXR 12/24/2022   No acute process. IMPRESSION/RECOMMENDATIONS:    Briefly, Darcie Ross is a 75-year-old male known to us, past medical history CKD stage 3B probably 2/2 nephrosclerosis and previous episode of ischemic ATN requiring temporary HD in May 2017, recurrent episode of ischemic ATN in March 2022 also requiring HD x2 with fair recovery of renal function, baseline creatinine 1.4 to 1.5 mg/dL, HTN, DM type II, A. fib on apixaban, HFpEF 55 to 60%, NIKO, pacemaker placement, hypothyroidism, hyperlipidemia, who presented to the ED on 12/24/2022 via EMS from home for altered mental status and decreased appetite. ED work-up found patient hypoxic, initial lab work revealed potassium level 7.5, creatinine 5.9 with , reasons for this consultation. Hyperkalemia protocol given in ED. Home medications include lisinopril, bumetanide, gabapentin, metformin, empagliflozin, metoprolol.     RADHA, stage III likely prerenal RADHA 2/2 intravascular volume depletion from poor oral intake in the setting of ACE inhibition, Prince catheter in place 24-hour urine output 700 cc. Creatinine level peaked at 5.9, has improved to 4.5 mg/dL with IVF administration. Obtain urine indices, begin sodium bicarbonate infusion, obtain renal ultrasound, will monitor closely for indications to begin hemodialysis. CT abdomen and pelvis unrevealing. CKD stage IIIb likely 2/2 nephrosclerosis and previous episodes of ischemic ATN requiring HD x2 separate occasions. Baseline creatinine 1.4 to 1.5 mg/dL    Hyperkalemia 2/2 decreased GFR and lisinopril, repeat hyperkalemia protocol, may need dialysis  HTN, on metoprolol, lisinopril on hold  Respiratory acidosis with high anion gap metabolic acidosis, pH 4.473, PCO2 55.1, HCO3 21.6, likely 2/2 uremia obtain beta hydroxybutyrate and lactic acid level, begin bicarbonate drip  Encephalopathy, 2/2 uremia versus medication toxicity (gabapentin), hold gabapentin in view of severely decreased GFR  Type II DM, holding metformin  Hypothyroidism on levothyroxine  NIKO  HFpEF 55 to 60%      Plan:    Begin sodium bicarb at 150 meq in D5W at 125 cc/h  Insulin IV 10 units followed by D50  BMP q4h  Lokelma 10 g p.o. 3 times daily  Insert Prince catheter  Strict intake and output  Obtain urine indices, UACR, UPCR  Await renal ultrasound  Monitor renal function closely for indications for hemodialysis    Thank you Dr. Chase Arredondo for allowing us to participate in the care of [facility].     Electronically signed by JACQUELINE Chi CNP on 12/25/2022 at 11:56 AM

## 2022-12-25 NOTE — ED NOTES
Patient's mouth and tongue are very dry. This RN provided the patient with water. Patient drank 1/4 of a cup of water to wet mouth and was not thirsty anymore.       Suhas Velázquez RN  12/25/22 [id number]

## 2022-12-25 NOTE — PROGRESS NOTES
12/24/22 2000   NIV Type   $NIV $Daily Charge   Skin Assessment Clean, dry, & intact   Skin Protection for O2 Device Yes   NIV Started/Stopped On   Mode Bilevel   Mask Type Full face mask   Mask Size Medium   Bonnet size Medium   Settings/Measurements   PIP Observed 15 cm H20   IPAP 15 cmH20   CPAP/EPAP 8 cmH2O   Vt (Measured) 764 mL   Resp 22   FiO2  40 %   Minute Volume (L/min) 9 Liters   Mask Leak (lpm) 49 lpm   Comfort Level Good   SpO2 99   Breath Sounds   Right Upper Lobe Diminished   Right Middle Lobe Diminished   Right Lower Lobe Diminished   Left Upper Lobe Diminished   Left Lower Lobe Diminished   Alarm Settings   Alarms On Y   Apnea (secs) 20 secs   Date: 12/25/2022    Time: 12:02 AM    Patient Placed On BIPAP/CPAP/ Non-Invasive Ventilation? Yes    If no must comment. Facial area red/color change? No           If YES are Blister/Lesion present? No   If yes must notify nursing staff  BIPAP/CPAP skin barrier?   Yes    Skin barrier type:mepilexlite       Comments:        Maddie Owen RCP

## 2022-12-25 NOTE — PROGRESS NOTES
[facility]  Internal Medicine Residency Program  Progress Note - House Team       Patient:  Romelia Garnica 69 y.o. male   MRN: [id number]       Date of Service: 12/25/2022    CC: Altered mental status  Overnight events: None    Subjective     Patient got admitted under hospitalist facility, transferred to house medicine service this morning. This morning, patient was seen and examined in bedside. Patient was alert, responsive, oriented to himself. Patient denied any shortness of breath. Denied any abdominal pain, chest pain, palpitation or any other symptoms. Patient clinically looks significantly hypervolemic.  proBNP ordered, resulted elevated. Patient was on 6 L nasal cannula during examination, later put on BiPAP. Nephrology was consulted for possible urgent dialysis due to hyperkalemia. Patient received multiple dose of insulin, calcium gluconate. Objective     Vitals: BP (!) 104/48   Pulse 67   Temp 97.6 °F (36.4 °C)   Resp 15   Wt 239 lb (108.4 kg)   SpO2 94%   BMI 37.43 kg/m²   Net IO Since Admission: No IO data has been entered for this period [12/25/22 0739]    Physical Exam  Constitutional:       Comments: More awake than the morning. Oriented to himself. Eyes:      Pupils: Pupils are equal, round, and reactive to light. Cardiovascular:      Rate and Rhythm: Normal rate and regular rhythm. Pulses: Normal pulses. Pulmonary:      Breath sounds: Rales present. Comments: Patient currently on BiPAP. Abdominal:      General: Bowel sounds are normal. There is distension. Palpations: Abdomen is soft. Tenderness: There is no abdominal tenderness. There is no guarding. Musculoskeletal:         General: Swelling present. Right lower leg: Edema present. Left lower leg: Edema present. Skin:     General: Skin is warm. Capillary Refill: Capillary refill takes less than 2 seconds.    Neurological:      General: No focal deficit present. Mental Status: He is alert. Psychiatric:         Behavior: Behavior normal.       Diet:   ADULT DIET; Regular; 5 carb choices (75 gm/meal); Low Potassium (Less than 3000 mg/day)      Pertinent Labs & Imaging Studies   carmela  CBC:   Recent Labs     12/24/22  1731 12/25/22  0554   WBC 12.6* 10.3   HGB 11.7* 10.8*   HCT 37.8 35.9*   MCV 94.5 97.6    218       BMP:    Recent Labs     12/24/22 2059 12/24/22  2250 12/25/22  0103 12/25/22  0338 12/25/22  0554   *  --   --  142 142   K 5.2* 6.4*  --  6.8* 6.5*     --   --  106 105   CO2 20*  --   --  21* 24   *  --   --  101* 102*   CREATININE 5.4*  --   --  5.1* 4.7*   GLUCOSE 303*  --  221 197* 304*       LIVER PROFILE:   Recent Labs     12/24/22  1731   AST 53*   ALT 51*   BILITOT 0.7   ALKPHOS 119       PT/INR:   Recent Labs     12/24/22  1731   PROTIME 22.9*   INR 2.1       APTT:   No results for input(s): APTT in the last 72 hours. Fasting Lipid Panel:    Lab Results   Component Value Date/Time    CHOL 118 11/16/2022 09:55 AM    TRIG 176 11/16/2022 09:55 AM    HDL 23 11/16/2022 09:55 AM       Cardiac Enzymes:    Lab Results   Component Value Date    CKTOTAL 50 03/11/2022    CKTOTAL 61 09/18/2021    CKTOTAL 53 05/26/2017    CKMB 2.7 09/18/2021    CKMB 1.2 05/26/2017    CKMB 1.6 05/25/2017    TROPONINI 0.05 (H) 05/26/2017    TROPONINI 0.04 (H) 05/25/2017    TROPONINI 0.05 (H) 05/25/2017       ABGs: No results found for: PHART, PO2ART, SMQ3MXN    Imaging Studies:     CT ABDOMEN PELVIS WO CONTRAST Additional Contrast? None    Result Date: 12/24/2022  EXAMINATION: CT OF THE ABDOMEN AND PELVIS WITHOUT CONTRAST 12/24/2022 10:07 pm TECHNIQUE: CT of the abdomen and pelvis was performed without the administration of intravenous contrast. Multiplanar reformatted images are provided for review.  Automated exposure control, iterative reconstruction, and/or weight based adjustment of the mA/kV was utilized to reduce the radiation dose to as low as reasonably achievable. COMPARISON: None. HISTORY: ORDERING SYSTEM PROVIDED HISTORY: maryana, ab pain TECHNOLOGIST PROVIDED HISTORY: Reason for exam:->maryana, ab pain Additional Contrast?->None Decision Support Exception - unselect if not a suspected or confirmed emergency medical condition->Emergency Medical Condition (MA) What reading provider will be dictating this exam?->CRC FINDINGS: Lower Chest: There is moderate right lower lobe and left lower lobe scattered subsegmental and posterior bibasilar compressive atelectasis. There are pacemaker wires localized in the right sided cardiac chambers. The heart is mildly enlarged. Liver: Normal size and contour. No infiltrative process or space-occupying lesion. Gallbladder/biliary tree: No gallstones, gallbladder wall thickening, or pericholecystic fluid. No significant intrahepatic or extrahepatic biliary dilatation. Spleen: Splenomegaly (13.6 cm). Pancreas: There is inflammatory stranding in the lesser sac and peripancreatic spaces which may have association with mild acute pancreatitis. Adrenal glands: Appropriate size and configuration. Kidneys: Normal anatomic position. No space-occupying lesion. No significant genitourinary tract calculi or hydronephrosis. GI/Bowel: No significant bowel distention. No significant colonic diverticulosis. Appendix: Unremarkable. Pelvis: Normal prostate. There is a Prince balloon catheter in the urinary bladder. Peritoneum/Retroperitoneum: There is advanced aortoiliac atherosclerotic disease suspect mild acute pancreatitis, as detailed above. .  The IVC is within normal limits. No free fluid or free air. Bones/Soft Tissues: The bones are osteopenic. Bilateral pedicle screws bridge L3, L4, L5. Silvia Shepard Suspect mild acute pancreatitis. Mild splenomegaly. Bilateral lower lobe atelectasis.      CT HEAD WO CONTRAST    Result Date: 12/24/2022  EXAMINATION: CT OF THE HEAD WITHOUT CONTRAST  12/24/2022 10:07 pm TECHNIQUE: CT of the head was performed without the administration of intravenous contrast. Automated exposure control, iterative reconstruction, and/or weight based adjustment of the mA/kV was utilized to reduce the radiation dose to as low as reasonably achievable. COMPARISON: None. HISTORY: ORDERING SYSTEM PROVIDED HISTORY: AMS TECHNOLOGIST PROVIDED HISTORY: Has a \"code stroke\" or \"stroke alert\" been called? ->No Reason for exam:->AMS What reading provider will be dictating this exam?->CRC FINDINGS: BRAIN/VENTRICLES: There is cerebral and cerebellar atrophy with associated ex vacuo dilatation of the ventricular system. There is mild ill-defined low-attenuation in the periventricular white matter, corona radiata, and centrum semiovale bilaterally which has association with age related microvascular white matter ischemic disease. There is no acute intracranial hemorrhage, mass effect or midline shift. No abnormal extra-axial fluid collection. The gray-white differentiation is maintained without evidence of an acute infarct. There is no evidence of hydrocephalus. ORBITS: The visualized portion of the orbits demonstrate no acute abnormality. SINUSES: The visualized paranasal sinuses and mastoid air cells demonstrate no acute abnormality. SOFT TISSUES/SKULL:  No acute abnormality of the visualized skull or soft tissues. No acute intracranial abnormality. Chronic parenchymal change including atrophy and old white matter ischemia. XR CHEST PORTABLE    Result Date: 12/24/2022  EXAMINATION: ONE XRAY VIEW OF THE CHEST 12/24/2022 4:08 pm COMPARISON: 04/22/2022 HISTORY: ORDERING SYSTEM PROVIDED HISTORY: ams TECHNOLOGIST PROVIDED HISTORY: Reason for exam:->ams What reading provider will be dictating this exam?->CRC FINDINGS: The lungs are without acute focal process. There is no effusion or pneumothorax. The cardiomediastinal silhouette is stable. The osseous structures are stable. No acute process.        Resident's Assessment and Plan Jane Mathews is a 69 y.o. male, past medical history of HFpEF, A. Fib on anticoagulation, CKD, hypertension, hyperlipidemia, type 2 diabetes, NIKO, hypothyroidism, presented to the ED with a complaint of altered mental status. She is currently being  managed for       Assessment       Altered mental status 2/2 multifactorial, uremia versus hyperglycemia. Acute hypoxic respiratory failure 2/2 CHF exacerbation. Clinically hypervolemic, elevated proBNP. Heart failure with preserved ejection fraction, last echo showed EF 65% with stage II diastolic heart failure. On GDMT with Jardiance. Follows up with CHF clinic. Atrial fibrillation, pacemaker placed. On anticoagulation with Eliquis at home. Hypertension, on Jardiance and metoprolol at home. Currently holding blood pressure medications due to soft blood pressure. Hyperlipidemia, on statin at home. Stage III RADHA on CKD 2/2 possible cardiorenal syndrome? .  Baseline creatinine 1.3-1.5. Uremia 2/2 #5  Hypervolemic hyponatremia 2/2 CHF exacerbation. (Resolved). Hyperkalemia 2/2 RADHA. Treated with insulin D50, calcium gluconate and Lokelma. High anion gap metabolic acidosis 2/2 RADHA. Hypothyroidism, on Synthroid 88 MCG at home. On admission elevated TSH 9.3, T4 1.08  Type 2 diabetes mellitus, presented with hyperglycemia. Received insulin for the treatment of hyperkalemia. Blood sugar improving. Plan:    Follow mentation. Hold Percocet. Continue aspirin  Continue IV diuresis  Strict intake output measurement  Continue Bipap  Hold metoprolol lisinopril for soft blood pressure. Continue Eliquis. Continue statin. Follow BMP every 4 hours for potassium. Continue Lokelma 10 mg 3 times daily  Nephrology on board, follow nephrology recommendation. Follow urine indicis work-up per nephrology. Bicarb drip 150 cc/h per nephrology. Considering for possible dialysis today. Hold home insulin regimen, monitor blood sugar ACHS.   Low-salt, low potassium diet. Continue Synthroid 88 MCG, follow TSH after 6 weeks. Hold allopurinol, follow uric acid result. Follow lactic acid.          Code Status: Full Code  PT/OT evaluation: Ordered  Disposition: Yet to be determined        Tarah Landry MD, PGY-2  Attending physician: Dr. Charo Gracia

## 2022-12-25 NOTE — PROGRESS NOTES
Pt was transferred to Residency program. Did not see the patient   +++++++++++++++++++++++++++++++++++++++++++++++++  Riky Cifuentes MD  [facility] Physician - [address] [place] [address]  +++++++++++++++++++++++++++++++++++++++++++++++++  NOTE: This report was transcribed using voice recognition software. Every effort was made to ensure accuracy; however, inadvertent computerized transcription errors may be present.

## 2022-12-25 NOTE — PROGRESS NOTES
[address] [facility]  Internal Medicine Residency / [address]    Attending Physician Statement  I have discussed the case, including pertinent history and exam findings with the resident and the team.  I have seen and examined the patient and the key elements of the encounter have been performed by me. I agree with the assessment, plan and orders as documented by the resident. Awake and just admitted, having twitching  VS stable and pulse Ox 99% on 5 liters NC  On BIPAP  Worsening renal function and Nephrology ordered US Ureters  K is 6. .5 aggressively treated  Cr 4.5 ,  and not worsening  BNP 6555  Pulmonary consult ordered  ABG pCO2 55/pO2 52.4/pH 7.21  Plan Follow Pulmonary,           Nephro recommendations    Remainder of medical problems as per resident note.       Lurdes Blair MD [facility] SPECIALTY [facility]  Internal Medicine Residency Faculty

## 2022-12-25 NOTE — ED NOTES
Dr. Freedom Brown notified of critical potassium of 6.77 via perfectserve.       Suhas Velázquez RN  12/25/22 4833

## 2022-12-26 ENCOUNTER — APPOINTMENT (OUTPATIENT)
Dept: GENERAL RADIOLOGY | Age: 67
End: 2022-12-26
Payer: MEDICARE

## 2022-12-26 LAB
AADO2: 142.4 MMHG
ALBUMIN SERPL-MCNC: 3.6 G/DL (ref 3.5–5.2)
ALP BLD-CCNC: 105 U/L (ref 40–129)
ALT SERPL-CCNC: 52 U/L (ref 0–40)
ANION GAP SERPL CALCULATED.3IONS-SCNC: 11 MMOL/L (ref 7–16)
ANION GAP SERPL CALCULATED.3IONS-SCNC: 13 MMOL/L (ref 7–16)
ANION GAP SERPL CALCULATED.3IONS-SCNC: 14 MMOL/L (ref 7–16)
AST SERPL-CCNC: 31 U/L (ref 0–39)
B.E.: 2.6 MMOL/L (ref -3–3)
BASOPHILS ABSOLUTE: 0.04 E9/L (ref 0–0.2)
BASOPHILS RELATIVE PERCENT: 0.5 % (ref 0–2)
BILIRUB SERPL-MCNC: 0.6 MG/DL (ref 0–1.2)
BUN BLDV-MCNC: 87 MG/DL (ref 6–23)
BUN BLDV-MCNC: 92 MG/DL (ref 6–23)
BUN BLDV-MCNC: 94 MG/DL (ref 6–23)
CALCIUM IONIZED: 1.17 MMOL/L (ref 1.15–1.33)
CALCIUM SERPL-MCNC: 9.1 MG/DL (ref 8.6–10.2)
CALCIUM SERPL-MCNC: 9.1 MG/DL (ref 8.6–10.2)
CALCIUM SERPL-MCNC: 9.4 MG/DL (ref 8.6–10.2)
CHLORIDE BLD-SCNC: 100 MMOL/L (ref 98–107)
CHLORIDE BLD-SCNC: 100 MMOL/L (ref 98–107)
CHLORIDE BLD-SCNC: 102 MMOL/L (ref 98–107)
CO2: 30 MMOL/L (ref 22–29)
CO2: 30 MMOL/L (ref 22–29)
CO2: 31 MMOL/L (ref 22–29)
COHB: 0.9 % (ref 0–1.5)
CORTISOL TOTAL: 19.59 MCG/DL (ref 2.68–18.4)
CREAT SERPL-MCNC: 2.6 MG/DL (ref 0.7–1.2)
CREAT SERPL-MCNC: 2.8 MG/DL (ref 0.7–1.2)
CREAT SERPL-MCNC: 3 MG/DL (ref 0.7–1.2)
CRITICAL: ABNORMAL
DATE ANALYZED: ABNORMAL
DATE OF COLLECTION: ABNORMAL
EKG ATRIAL RATE: 142 BPM
EKG Q-T INTERVAL: 132 MS
EKG QRS DURATION: 136 MS
EKG QTC CALCULATION (BAZETT): 203 MS
EKG R AXIS: -85 DEGREES
EKG T AXIS: 0 DEGREES
EKG VENTRICULAR RATE: 142 BPM
EOSINOPHILS ABSOLUTE: 0.41 E9/L (ref 0.05–0.5)
EOSINOPHILS RELATIVE PERCENT: 5.1 % (ref 0–6)
FERRITIN: 125 NG/ML
FIO2: 40 %
FOLATE: 13.9 NG/ML (ref 4.8–24.2)
GFR SERPL CREATININE-BSD FRML MDRD: 22 ML/MIN/1.73
GFR SERPL CREATININE-BSD FRML MDRD: 24 ML/MIN/1.73
GFR SERPL CREATININE-BSD FRML MDRD: 26 ML/MIN/1.73
GLUCOSE BLD-MCNC: 218 MG/DL (ref 74–99)
GLUCOSE BLD-MCNC: 228 MG/DL (ref 74–99)
GLUCOSE BLD-MCNC: 259 MG/DL (ref 74–99)
HCO3: 27.8 MMOL/L (ref 22–26)
HCT VFR BLD CALC: 32.2 % (ref 37–54)
HEMOGLOBIN: 10.2 G/DL (ref 12.5–16.5)
HHB: 4.6 % (ref 0–5)
IMMATURE GRANULOCYTES #: 0.1 E9/L
IMMATURE GRANULOCYTES %: 1.2 % (ref 0–5)
IRON SATURATION: 13 % (ref 20–55)
IRON: 38 MCG/DL (ref 59–158)
LAB: ABNORMAL
LYMPHOCYTES ABSOLUTE: 0.78 E9/L (ref 1.5–4)
LYMPHOCYTES RELATIVE PERCENT: 9.6 % (ref 20–42)
Lab: ABNORMAL
MAGNESIUM: 2 MG/DL (ref 1.6–2.6)
MCH RBC QN AUTO: 29.3 PG (ref 26–35)
MCHC RBC AUTO-ENTMCNC: 31.7 % (ref 32–34.5)
MCV RBC AUTO: 92.5 FL (ref 80–99.9)
METER GLUCOSE: 177 MG/DL (ref 74–99)
METER GLUCOSE: 197 MG/DL (ref 74–99)
METER GLUCOSE: 229 MG/DL (ref 74–99)
METER GLUCOSE: 256 MG/DL (ref 74–99)
METHB: 0.4 % (ref 0–1.5)
MODE: ABNORMAL
MONOCYTES ABSOLUTE: 0.77 E9/L (ref 0.1–0.95)
MONOCYTES RELATIVE PERCENT: 9.5 % (ref 2–12)
NEUTROPHILS ABSOLUTE: 5.99 E9/L (ref 1.8–7.3)
NEUTROPHILS RELATIVE PERCENT: 74.1 % (ref 43–80)
O2 SATURATION: 95.3 % (ref 92–98.5)
O2HB: 94.1 % (ref 94–97)
OPERATOR ID: ABNORMAL
PATIENT TEMP: 37 C
PCO2: 45.6 MMHG (ref 35–45)
PDW BLD-RTO: 17.2 FL (ref 11.5–15)
PEEP/CPAP: 6 CMH2O
PFO2: 2.01 MMHG/%
PH BLOOD GAS: 7.4 (ref 7.35–7.45)
PHOSPHORUS: 5.3 MG/DL (ref 2.5–4.5)
PIP: 14 CMH2O
PLATELET # BLD: 225 E9/L (ref 130–450)
PMV BLD AUTO: 9.5 FL (ref 7–12)
PO2: 80.4 MMHG (ref 75–100)
POTASSIUM SERPL-SCNC: 4.5 MMOL/L (ref 3.5–5)
POTASSIUM SERPL-SCNC: 4.9 MMOL/L (ref 3.5–5)
POTASSIUM SERPL-SCNC: 4.9 MMOL/L (ref 3.5–5)
PRO-BNP: 4125 PG/ML (ref 0–125)
PROCALCITONIN: 0.34 NG/ML (ref 0–0.08)
RBC # BLD: 3.48 E12/L (ref 3.8–5.8)
RI(T): 1.77
SODIUM BLD-SCNC: 143 MMOL/L (ref 132–146)
SODIUM BLD-SCNC: 144 MMOL/L (ref 132–146)
SODIUM BLD-SCNC: 144 MMOL/L (ref 132–146)
SOURCE, BLOOD GAS: ABNORMAL
THB: 11.4 G/DL (ref 11.5–16.5)
TIME ANALYZED: 600
TOTAL IRON BINDING CAPACITY: 301 MCG/DL (ref 250–450)
TOTAL PROTEIN: 7.2 G/DL (ref 6.4–8.3)
VITAMIN B-12: 401 PG/ML (ref 211–946)
WBC # BLD: 8.1 E9/L (ref 4.5–11.5)

## 2022-12-26 PROCEDURE — 83735 ASSAY OF MAGNESIUM: CPT

## 2022-12-26 PROCEDURE — 82607 VITAMIN B-12: CPT

## 2022-12-26 PROCEDURE — 36556 INSERT NON-TUNNEL CV CATH: CPT

## 2022-12-26 PROCEDURE — 82533 TOTAL CORTISOL: CPT

## 2022-12-26 PROCEDURE — 83540 ASSAY OF IRON: CPT

## 2022-12-26 PROCEDURE — 83880 ASSAY OF NATRIURETIC PEPTIDE: CPT

## 2022-12-26 PROCEDURE — 84145 PROCALCITONIN (PCT): CPT

## 2022-12-26 PROCEDURE — 82962 GLUCOSE BLOOD TEST: CPT

## 2022-12-26 PROCEDURE — 87088 URINE BACTERIA CULTURE: CPT

## 2022-12-26 PROCEDURE — 71045 X-RAY EXAM CHEST 1 VIEW: CPT

## 2022-12-26 PROCEDURE — 80048 BASIC METABOLIC PNL TOTAL CA: CPT

## 2022-12-26 PROCEDURE — 02HV33Z INSERTION OF INFUSION DEVICE INTO SUPERIOR VENA CAVA, PERCUTANEOUS APPROACH: ICD-10-PCS | Performed by: SURGERY

## 2022-12-26 PROCEDURE — 85025 COMPLETE CBC W/AUTO DIFF WBC: CPT

## 2022-12-26 PROCEDURE — 99233 SBSQ HOSP IP/OBS HIGH 50: CPT | Performed by: INTERNAL MEDICINE

## 2022-12-26 PROCEDURE — 2580000003 HC RX 258: Performed by: NURSE PRACTITIONER

## 2022-12-26 PROCEDURE — 6370000000 HC RX 637 (ALT 250 FOR IP): Performed by: FAMILY MEDICINE

## 2022-12-26 PROCEDURE — 82728 ASSAY OF FERRITIN: CPT

## 2022-12-26 PROCEDURE — 6360000002 HC RX W HCPCS

## 2022-12-26 PROCEDURE — 92526 ORAL FUNCTION THERAPY: CPT

## 2022-12-26 PROCEDURE — 82746 ASSAY OF FOLIC ACID SERUM: CPT

## 2022-12-26 PROCEDURE — 84100 ASSAY OF PHOSPHORUS: CPT

## 2022-12-26 PROCEDURE — 82805 BLOOD GASES W/O2 SATURATION: CPT

## 2022-12-26 PROCEDURE — 1200000000 HC SEMI PRIVATE

## 2022-12-26 PROCEDURE — 82330 ASSAY OF CALCIUM: CPT

## 2022-12-26 PROCEDURE — 80053 COMPREHEN METABOLIC PANEL: CPT

## 2022-12-26 PROCEDURE — 83550 IRON BINDING TEST: CPT

## 2022-12-26 PROCEDURE — 93010 ELECTROCARDIOGRAM REPORT: CPT | Performed by: INTERNAL MEDICINE

## 2022-12-26 PROCEDURE — 94660 CPAP INITIATION&MGMT: CPT

## 2022-12-26 PROCEDURE — 2580000003 HC RX 258

## 2022-12-26 PROCEDURE — 92610 EVALUATE SWALLOWING FUNCTION: CPT

## 2022-12-26 RX ORDER — HEPARIN SODIUM (PORCINE) LOCK FLUSH IV SOLN 100 UNIT/ML 100 UNIT/ML
3 SOLUTION INTRAVENOUS EVERY 12 HOURS SCHEDULED
Status: DISCONTINUED | OUTPATIENT
Start: 2022-12-26 | End: 2022-12-30 | Stop reason: HOSPADM

## 2022-12-26 RX ORDER — HEPARIN SODIUM (PORCINE) LOCK FLUSH IV SOLN 100 UNIT/ML 100 UNIT/ML
3 SOLUTION INTRAVENOUS PRN
Status: DISCONTINUED | OUTPATIENT
Start: 2022-12-26 | End: 2022-12-30 | Stop reason: HOSPADM

## 2022-12-26 RX ORDER — SODIUM CHLORIDE 0.9 % (FLUSH) 0.9 %
5-40 SYRINGE (ML) INJECTION EVERY 12 HOURS SCHEDULED
Status: DISCONTINUED | OUTPATIENT
Start: 2022-12-26 | End: 2022-12-30 | Stop reason: HOSPADM

## 2022-12-26 RX ORDER — FENTANYL CITRATE 50 UG/ML
50 INJECTION, SOLUTION INTRAMUSCULAR; INTRAVENOUS ONCE
Status: DISCONTINUED | OUTPATIENT
Start: 2022-12-26 | End: 2022-12-26

## 2022-12-26 RX ORDER — BUMETANIDE 1 MG/1
2 TABLET ORAL 2 TIMES DAILY
Status: DISCONTINUED | OUTPATIENT
Start: 2022-12-27 | End: 2022-12-30

## 2022-12-26 RX ORDER — SODIUM CHLORIDE 0.9 % (FLUSH) 0.9 %
5-40 SYRINGE (ML) INJECTION PRN
Status: DISCONTINUED | OUTPATIENT
Start: 2022-12-26 | End: 2022-12-30 | Stop reason: HOSPADM

## 2022-12-26 RX ORDER — LIDOCAINE HYDROCHLORIDE 10 MG/ML
5 INJECTION, SOLUTION EPIDURAL; INFILTRATION; INTRACAUDAL; PERINEURAL ONCE
Status: DISCONTINUED | OUTPATIENT
Start: 2022-12-26 | End: 2022-12-30 | Stop reason: HOSPADM

## 2022-12-26 RX ORDER — SODIUM CHLORIDE 9 MG/ML
INJECTION, SOLUTION INTRAVENOUS PRN
Status: DISCONTINUED | OUTPATIENT
Start: 2022-12-26 | End: 2022-12-30 | Stop reason: HOSPADM

## 2022-12-26 RX ADMIN — WATER 1000 MG: 1 INJECTION INTRAMUSCULAR; INTRAVENOUS; SUBCUTANEOUS at 21:09

## 2022-12-26 RX ADMIN — INSULIN LISPRO 4 UNITS: 100 INJECTION, SOLUTION INTRAVENOUS; SUBCUTANEOUS at 12:24

## 2022-12-26 RX ADMIN — APIXABAN 5 MG: 5 TABLET, FILM COATED ORAL at 21:07

## 2022-12-26 RX ADMIN — ACETAMINOPHEN 650 MG: 325 TABLET ORAL at 12:11

## 2022-12-26 RX ADMIN — SODIUM CHLORIDE, PRESERVATIVE FREE 10 ML: 5 INJECTION INTRAVENOUS at 21:14

## 2022-12-26 ASSESSMENT — PAIN SCALES - GENERAL
PAINLEVEL_OUTOF10: 0

## 2022-12-26 NOTE — PLAN OF CARE
Problem: Chronic Conditions and Co-morbidities  Goal: Patient's chronic conditions and co-morbidity symptoms are monitored and maintained or improved  12/26/2022 0054 by Carrie Fontana RN  Outcome: Progressing  12/25/2022 1319 by Rudy Fabian RN  Outcome: Progressing     Problem: Discharge Planning  Goal: Discharge to home or other facility with appropriate resources  12/26/2022 0054 by Carrie Fontana RN  Outcome: Progressing  12/25/2022 1319 by Rudy Fabian RN  Outcome: Progressing     Problem: Safety - Adult  Goal: Free from fall injury  12/26/2022 0054 by Carrie Fontana RN  Outcome: Progressing  12/25/2022 1319 by Rudy Fabian RN  Outcome: Progressing     Problem: Pain  Goal: Verbalizes/displays adequate comfort level or baseline comfort level  Outcome: Progressing     Problem: Skin/Tissue Integrity  Goal: Absence of new skin breakdown  Description: 1. Monitor for areas of redness and/or skin breakdown  2. Assess vascular access sites hourly  3. Every 4-6 hours minimum:  Change oxygen saturation probe site  4. Every 4-6 hours:  If on nasal continuous positive airway pressure, respiratory therapy assess nares and determine need for appliance change or resting period.   Outcome: Progressing

## 2022-12-26 NOTE — PROGRESS NOTES
[address] [facility]   Department of Internal Medicine   MICU Progress Note    Patient:  Lynne Hutson 69 y.o. male   MRN: [id number]       Date of Service: 2022    Allergy: Patient has no known allergies. CC AMS  Subjective   Awake, alert and oriented x2-3 intermittenly confuse  Follow commands  On bumex gtt  Mentation improving  Denies of fever, chills, sob, chest pain, n,v,d or abd pain  No overnight event   No fevers. Objective     TEMPERATURE:  Current - Temp: 98.3 °F (36.8 °C); Max - Temp  Av.4 °F (36.9 °C)  Min: 98.2 °F (36.8 °C)  Max: 98.6 °F (37 °C)    RESPIRATIONS RANGE: Resp  Av.2  Min: 18  Max: 34    PULSE RANGE: Pulse  Av.7  Min: 67  Max: 100    BLOOD PRESSURE RANGE:  Systolic (01TWX), JJV:009 , Min:92 , IWZ:508   ; Diastolic (61FNF), YOLANDA:13, Min:45, Max:74      PULSE OXIMETRY RANGE: SpO2  Av.5 %  Min: 95 %  Max: 100 %    I & O - 24hr:    Intake/Output Summary (Last 24 hours) at 2022 0857  Last data filed at 2022 0557  Gross per 24 hour   Intake --   Output 6010 ml   Net -6010 ml     I/O last 3 completed shifts:  In: -   Out: 5948 [Urine:6710] No intake/output data recorded. Weight change:     Physical Exam:  General Appearance: alert, appears stated age, cooperative, and no distress  HEENT:  Head: Normocephalic, no lesions, without obvious abnormality. Eye: Normal external eye, conjunctiva, lids cornea, JELENA. Nose: Normal external nose, mucus membranes and septum. Pharynx: Dental Hygiene adequate. Normal buccal mucosa. Normal pharynx. Neck / Thyroid: Supple, no masses, nodes, nodules or enlargement. Neck: no adenopathy, no carotid bruit, no JVD, supple, symmetrical, trachea midline, and thyroid not enlarged, symmetric, no tenderness/mass/nodules  Lung: non labored, diminished lung sounds throughout lung fields, no wheezing noted.    Heart: regular rate and rhythm, S1, S2 normal, no murmur, click, rub or gallop  Abdomen: soft, non-tender; bowel sounds normal; no masses,  no organomegaly  Extremities:  edema 3+ , no ulcers, gangrene or trophic changes, and varicose veins noted anasarca noted. Musculokeletal: No joint swelling, no muscle tenderness. ROM normal in all joints of extremities.    Neurologic: Mental status: Alert, oriented, thought content appropriate      Medications     Continuous Infusions:   dextrose      bumetanide 0.1 mg/mL infusion 0.5 mg/hr (12/25/22 2112)    sodium chloride       Scheduled Meds:   [Held by provider] allopurinol  100 mg Oral Daily    aspirin  81 mg Oral Daily    atorvastatin  40 mg Oral Daily    [Held by provider] bumetanide  2 mg Oral Daily    vitamin D  2,000 Units Oral Daily    apixaban  5 mg Oral BID    ferrous sulfate  325 mg Oral Daily with breakfast    levothyroxine  88 mcg Oral Daily    [Held by provider] lisinopril  5 mg Oral Daily    [Held by provider] metoprolol succinate  25 mg Oral Daily    [Held by provider] insulin glargine-yfgn  20 Units SubCUTAneous Nightly    cefTRIAXone (ROCEPHIN) IV  1,000 mg IntraVENous Q24H    [Held by provider] gabapentin  600 mg Oral Daily    insulin lispro  0-8 Units SubCUTAneous TID WC    insulin lispro  0-4 Units SubCUTAneous Nightly     PRN Meds: [Held by provider] oxyCODONE-acetaminophen **AND** [Held by provider] oxyCODONE, glucose, dextrose bolus **OR** dextrose bolus, glucagon (rDNA), dextrose, albuterol, [DISCONTINUED] dextrose bolus **OR** dextrose bolus, sodium chloride flush, sodium chloride, ondansetron **OR** ondansetron, polyethylene glycol, acetaminophen **OR** acetaminophen  Nutrition:   NPO until seen by speech     Labs and Imaging Studies     CBC:   Recent Labs     12/24/22  1731 12/25/22  0554 12/26/22  0514   WBC 12.6* 10.3 8.1   RBC 4.00 3.68* 3.48*   HGB 11.7* 10.8* 10.2*   HCT 37.8 35.9* 32.2*   MCV 94.5 97.6 92.5   MCH 29.3 29.3 29.3   MCHC 31.0* 30.1* 31.7*   RDW 17.3* 17.3* 17.2*    218 225   MPV 9.8 9.9 9.5       BMP:    Recent Labs 12/24/22  1731 12/24/22 2007 12/25/22  2244 12/26/22 0259 12/26/22 0514      < > 144 143 144   K 7.5*   < > 5.0 4.9 4.9      < > 104 102 100   CO2 21*   < > 26 30* 30*   *   < > 94* 94* 92*   CREATININE 5.9*   < > 3.3* 3.0* 2.8*   GLUCOSE 249*   < > 215* 228* 218*   CALCIUM 9.4   < > 9.3 9.1 9.1   PROT 8.3  --   --   --  7.2   LABALBU 4.3  --   --   --  3.6   BILITOT 0.7  --   --   --  0.6   ALKPHOS 119  --   --   --  105   AST 53*  --   --   --  31   ALT 51*  --   --   --  52*    < > = values in this interval not displayed. LIVER PROFILE:   Recent Labs     12/24/22 1731 12/26/22  0514   AST 53* 31   ALT 51* 52*   BILITOT 0.7 0.6   ALKPHOS 119 105       PT/INR:   Recent Labs     12/24/22 1731   PROTIME 22.9*   INR 2.1       APTT:   No results for input(s): APTT in the last 72 hours. Fasting Lipid Panel:    Lab Results   Component Value Date/Time    CHOL 118 11/16/2022 09:55 AM    TRIG 176 11/16/2022 09:55 AM    HDL 23 11/16/2022 09:55 AM       Cardiac Enzymes:    Lab Results   Component Value Date    CKTOTAL 50 03/11/2022    CKTOTAL 61 09/18/2021    CKTOTAL 53 05/26/2017    CKMB 2.7 09/18/2021    CKMB 1.2 05/26/2017    CKMB 1.6 05/25/2017    TROPONINI 0.05 (H) 05/26/2017    TROPONINI 0.04 (H) 05/25/2017    TROPONINI 0.05 (H) 05/25/2017       Notable Cultures:      Blood cultures   Blood Culture, Routine   Date Value Ref Range Status   09/17/2021 5 Days no growth  Final     Respiratory cultures No results found for: RESPCULTURE   Gram Stain Result   Date Value Ref Range Status   05/29/2017   Final    Gram stain performed on unspun fluidPolymorphonuclear leukocytes not seenEpithelial cells not seenRare Erythrocytes     Urine   Urine Culture, Routine   Date Value Ref Range Status   05/25/2017 Growth not present  Final     Legionella No results found for: LABLEGI  C Diff PCR No results found for: CDIFPCR  Wound culture/abscess: No results for input(s): WNDABS in the last 72 hours.   Tip culture:No results for input(s): CXCATHTIP in the last 72 hours. Antibiotic  Days  Day started   Rocephin                              Oxygen: Additional Respiratory Assessments  Heart Rate: 97  Resp: 22  SpO2: 99 %     Nasal cannula L/min     Face mask %     Reservoirs mask %       ABG     PH  7.40   PCO2  45   PO2  80   HCO3  27   Sat%  95   FIO2     DATES 12/26/22        Lines:  Site  Day  Date inserted     TLC   R subclavian            PICC              Arterial line              Peripheral line              HD cath            Urinary Catheter 12/24/22 Prince-Output (mL): 235 mL    Imaging Studies:    XR CHEST PORTABLE   Final Result   Interval placement of a right subclavian central venous catheter terminating   in the mid SVC. No postprocedural pneumothorax. Cardiac size remains   enlarged with interstitial opacifications bilateral mildly increased from   prior without effusion. XR CHEST PORTABLE   Final Result   Catheter in the right neck appears to be directed cranially. Recommend   repositioning. US RETROPERITONEAL COMPLETE   Final Result   1. Bilateral renal cortical thinning. There is no hydronephrosis on either   side. 2.  A Prince catheter is decompressing the bladder. CT HEAD WO CONTRAST   Final Result   No acute intracranial abnormality. Chronic parenchymal change including atrophy and old white matter ischemia. CT ABDOMEN PELVIS WO CONTRAST Additional Contrast? None   Final Result   Suspect mild acute pancreatitis. Mild splenomegaly. Bilateral lower lobe atelectasis. XR CHEST PORTABLE   Final Result   No acute process.                 APRN- CNP Assessment and PLan   In summary, 69years old male with PMHx of CKD Stage 3b, previous episodes of ATN requiring HD in May 2017 and March 2022, HTN, hyperlipidemia, DM type II, hypothyroidism, atrial fibrillation on chronic anticoagulation, HFpEF 55%, NIKO, s/p pacemaker placement, presented to ED on 12/24/22 with AMS, found to be in RADHA, on bumex gtt, transfer to MICU on 12/25/22.       Neuro   Acute toxic encephalopathy, possibly related to uremia/infectious process/gabapentin administration in the setting of a low GFR              -CT head negative for any acute process              -Ammonia 16              -Continue to monitor neurovascular status              -Hold gabapentin                Respiratory   Acute hypoxic respiratory failure with hypercapnia, on admission, patient 84% on room air, currently on NC with pulse ox of 98%  NIKO              -Bipap qhs and PRN    - currently on supplement oxygen,  -Wean oxygen as tolerated. Keep O2 sat 90-92%  -Duonebs Q4 while awake       Cardiovascular   HTN  HFpEF 55-60%, proBNP 6,555, home medication jardiance  Atrial fibrillation on chronic anticoagulation  Elevated troponin, most likely type II MI  Hyperlipidemia              -Hold home antihypertensives              -Continue apixaban              -Continue statin              -Hold jardiance     Gastrointestinal   Poor appetite, most likely 2/2 uremia      - swallow eval             - trend BMP        Renal   RADHA, Stage III, multifactorial, most likely hemodynamically mediated due to decompensated heart failure in the setting of ACE inhibition administration and poor oral intake/diuretic administration. On admission, creatinine 5.9 mg/dL.   H/o ATN requiring temporary HD, May 2017, March 2022  CKD Stage IIIb  HAGMA, most likely 2/2 uremia and lactic acidosis  Lactic acidosis  Hyperkalemia 2/2 decreased GFR and ACE inhibition administration  H/o of vitamin d deficiency, on cholecalciferol  H/o Gout, home medication allopurinol              -Nephrology following, appreciate input              - d/c Bumex drip , now on Bumex 1mg BID               - d/c bicarb gtt               -Strict I's and O's   - trend BMP               -Hold lisinopril              -Continue to monitor potassium             -Continue cholecalciferol                Infectious Disease   UTI, complicated              -Pan cultures pending   - RVP negative    - UA (NGTD)               -UA with small leukocytes esterase, few bacteria              -Start Ceftriaxone for 10 days     Hematology/Oncology   Anemia              -Obtain iron studies, currently on ferrous sulfate              -CBC daily  DVT prophylaxis: apixiaban     Endocrine   DM Type II              -hold home medication metformin              -ISS              -Hypoglycemia protocol  Hypothyroidism              -TSH 9.330              -Continue levothyroxine     Social/Spiritual/DNR/Other   Code: Full  Disposition: ICU  Fluids/lytes/nutrition: none/kvo/ diet-carb/low na/K+            Rod Embs, APRN-CNP   Critical Care     Discussed case with Attending Physician: Dr. Shobha Carrizales

## 2022-12-26 NOTE — PROCEDURES
Malcolm Diana is a 69 y.o. male patient. 1. Hyperkalemia    2. Dehydration    3. Altered mental status, unspecified altered mental status type    4. RADHA (acute kidney injury) ([address]    5. Class 2 severe obesity with serious comorbidity in adult, unspecified BMI, unspecified obesity type ([facility]      Past Medical History:   Diagnosis Date    RADHA (acute kidney injury) ([facility] 3/10/2022    Atrial fibrillation ([facility]     Carpal tunnel syndrome     Encounter regarding vascular access for dialysis for ESRD ([facility] 3/12/2022    Hyperlipidemia     Hypertension     Hypothyroidism     Lung nodule     Nocturnal hypoxemia due to obesity 8/8/2013    Obesity     NIKO (obstructive sleep apnea) 8/8/2013    [facility]    Osteoarthritis     Pinched nerve     Lumbar    Restrictive lung disease secondary to obesity 7/25/2016    Sinus node dysfunction (HCC)     Type II or unspecified type diabetes mellitus without mention of complication, not stated as uncontrolled      Blood pressure (!) 125/52, pulse 72, temperature 98.6 °F (37 °C), temperature source Temporal, resp. rate 22, height 5' 7\" (1.702 m), weight 239 lb (108.4 kg), SpO2 98 %. Central Line    Date/Time: 12/26/2022 2:59 AM  Performed by: Dianna Oakes DO  Authorized by: Dianna aOkes DO   Consent: Written consent obtained.   Risks and benefits: risks, benefits and alternatives were discussed  Procedure consent: procedure consent matches procedure scheduled  Test results: test results available and properly labeled  Imaging studies: imaging studies available  Required items: required blood products, implants, devices, and special equipment available  Patient identity confirmed: verbally with patient, arm band, provided demographic data and hospital-assigned identification number  Indications: vascular access  Anesthesia: local infiltration    Anesthesia:  Local Anesthetic: lidocaine 1% without epinephrine  Preparation: skin prepped with 2% chlorhexidine  Skin prep agent dried: skin prep agent completely dried prior to procedure  Sterile barriers: all five maximum sterile barriers used - cap, mask, sterile gown, sterile gloves, and large sterile sheet  Hand hygiene: hand hygiene performed prior to central venous catheter insertion  Location details: right internal jugular  Patient position: Trendelenburg  Catheter type: triple lumen  Catheter size: 7 Fr  Pre-procedure: landmarks identified  Ultrasound guidance: yes  Sterile ultrasound techniques: sterile gel and sterile probe covers were used  Number of attempts: 1  Successful placement: yes  Post-procedure: line sutured and dressing applied  Assessment: blood return through all ports, free fluid flow and placement verified by x-ray  Patient tolerance: patient tolerated the procedure well with no immediate complications    Dr. Jim Bernstein was available for the procedure    Bryce Baxter DO  12/26/2022

## 2022-12-26 NOTE — CONSULTS
Critical Care Admit/Consult Note         Patient - Sobeida Bowers   MRN -  [id number]   Vikki # - [de-identified]   - 1955      Date of Admission -  2022  5:19 PM  Date of evaluation -  2022  4403/4403-A   Hospital Day - 1      ADMIT/CONSULT DETAILS     Reason for Admit/Consult   Hyperkalemia, Carrie Levi MD  Primary Care Physician - Izzy Martins MD         BRIANA Sarmiento is a 69 y.o. male who was admitted on 22 after presenting to the ED via EMS for AMS and decreased appetite. While in the ED, the patient was found to be hypoxic and hyperkalemic with an RADHA. Labs significant to this admission include: Sodium 137, potassium 7.5, CO2 21, , creatinine 5.9, lactic 2.4, TSH 9.330. CT head was negative for any acute process, CT ABD pelvis suspect mild acute pancreatitis and mild splenomegaly, bilateral lower lobe atelectasis. Hyperkalemia protocol given to the patient and nephrology was consulted. The patient was originally admitted to a medical floor but transferred to MICU due to continued hyperkalemia, with the possibly need for dialysis. Past medical history of CKD Stage 3b, previous episodes of ATN requiring HD in May 2017 and 2022, HTN, hyperlipidemia, DM type II, hypothyroidism, atrial fibrillation on chronic anticoagulation, HFpEF 55%, NIKO, s/p pacemaker placement,       Upon arrival to MICU, the patient is alert and oriented to himself. Bumex drip running into a peripheral and left arm noted to have blistering from the wrist up to the bicep, cap refill <3, palpable pulse, extremity cool to touch, able to feel sensation and move fingers. Vital signs stable. Unable to obtain ROS due to mental status. The patient's wife updated regarding the plan of care and the patient is a full code.       Past Medical History         Diagnosis Date    RADHA (acute kidney injury) ([address] 3/10/2022    Atrial fibrillation ([facility] Carpal tunnel syndrome     Encounter regarding vascular access for dialysis for ESRD ([facility] 3/12/2022    Hyperlipidemia     Hypertension     Hypothyroidism     Lung nodule     Nocturnal hypoxemia due to obesity 8/8/2013    Obesity     NIKO (obstructive sleep apnea) 8/8/2013    [facility]    Osteoarthritis     Pinched nerve     Lumbar    Restrictive lung disease secondary to obesity 7/25/2016    Sinus node dysfunction (HCC)     Type II or unspecified type diabetes mellitus without mention of complication, not stated as uncontrolled         Past Surgical History           Procedure Laterality Date    BACK SURGERY  2012    [facility]. Pinched nerve in back    BACK SURGERY  05/30/2017    BACK SURGERY N/A 11/8/2022    EXCISON OF SOFT TISSUE NEOPLASM OF UPPER BACK performed by Chepe Wiley MD at [facility]  2007    multiple colon polyps    COLONOSCOPY  06/04/2012    COLONOSCOPY    COLONOSCOPY  04/26/2022    polyp; diverticula--sarah    COLONOSCOPY N/A 4/26/2022    COLONOSCOPY POLYPECTOMY HOT BIOPSY (Snare) performed by Chepe Wiley MD at [address]      lennie cataracts implant    HERNIA REPAIR      umbilical    PACEMAKER PLACEMENT  10/03/2017    Medtronic dual chamber    Dr. Malachi Soler Right      Influenza:  Not indicated  Pneumococcal Polysaccharide:  Not indicated    Current Medications   Current Medications    [Held by provider] allopurinol  100 mg Oral Daily    aspirin  81 mg Oral Daily    atorvastatin  40 mg Oral Daily    [Held by provider] bumetanide  2 mg Oral Daily    vitamin D  2,000 Units Oral Daily    apixaban  5 mg Oral BID    ferrous sulfate  325 mg Oral Daily with breakfast    levothyroxine  88 mcg Oral Daily    [Held by provider] lisinopril  5 mg Oral Daily    [Held by provider] metoprolol succinate  25 mg Oral Daily    [Held by provider] insulin glargine-yfgn  20 Units SubCUTAneous Nightly    sodium zirconium cyclosilicate  10 g Oral TID [Held by provider] gabapentin  600 mg Oral Daily    insulin lispro  0-8 Units SubCUTAneous TID WC    insulin lispro  0-4 Units SubCUTAneous Nightly     [Held by provider] oxyCODONE-acetaminophen **AND** [Held by provider] oxyCODONE, glucose, dextrose bolus **OR** dextrose bolus, glucagon (rDNA), dextrose, albuterol, [DISCONTINUED] dextrose bolus **OR** dextrose bolus, sodium chloride flush, sodium chloride, ondansetron **OR** ondansetron, polyethylene glycol, acetaminophen **OR** acetaminophen  IV Drips/Infusions   dextrose      sodium bicarbonate infusion 125 mL/hr at 12/25/22 1146    bumetanide 0.1 mg/mL infusion 0.2 mg/hr (12/25/22 1847)    sodium chloride       Home Medications  Medications Prior to Admission: insulin glargine (BASAGLAR KWIKPEN) 100 UNIT/ML injection pen, Inject 55 Units into the skin nightly PAP medication. bumetanide (BUMEX) 2 MG tablet, Take 1 tablet by mouth daily  gabapentin (NEURONTIN) 300 MG capsule, Take 2 capsules by mouth in the morning and at bedtime for 90 days. mometasone-formoterol (DULERA) 200-5 MCG/ACT inhaler, Inhale 2 puffs into the lungs 2 times daily Rinse mouth after using. PAP medication. Insulin Pen Needle (PEN NEEDLES) 31G X 6 MM MISC, Once daily. May substitute brand for insurance coverage. Lancets MISC, Give Delica lancets. Tests twice a day A.M. And P.M  blood glucose test strips (ONETOUCH VERIO) strip, TEST IN THE MORNING AND IN THE EVENING  lisinopril (PRINIVIL;ZESTRIL) 5 MG tablet, Take 1 tablet by mouth daily  insulin lispro, 1 Unit Dial, (HUMALOG KWIKPEN) 100 UNIT/ML SOPN, Inject 20 Units into the skin 3 times daily (before meals) for 225 doses  ferrous sulfate (IRON 325) 325 (65 Fe) MG tablet, Take 325 mg by mouth daily (with breakfast)  oxyCODONE-acetaminophen (PERCOCET) 7.5-325 MG per tablet, Take 1 tablet by mouth 2 times daily as needed.   allopurinol (ZYLOPRIM) 100 MG tablet, Take 100 mg by mouth daily  Cholecalciferol (VITAMIN D) 50 MCG (2000 UT) CAPS capsule, Take 2,000 Units by mouth daily  metFORMIN (GLUCOPHAGE) 500 MG tablet, Take 1 tablet by mouth 2 times daily (with meals)  aspirin (ASPIR-LOW) 81 MG EC tablet, TAKE ONE TABLET BY MOUTH EVERY DAY  zoster recombinant adjuvanted vaccine (SHINGRIX) 50 MCG/0.5ML SUSR injection, 1 dose now and repeat in 2-6 months  ELIQUIS 5 MG TABS tablet, Take 1 tablet by mouth in the morning and 1 tablet before bedtime. atorvastatin (LIPITOR) 40 MG tablet, TAKE ONE TABLET BY MOUTH DAILY  metoprolol succinate (TOPROL XL) 25 MG extended release tablet, Take 1 tablet by mouth in the morning. levothyroxine (SYNTHROID) 88 MCG tablet, Take 1 tablet by mouth in the morning. empagliflozin (JARDIANCE) 25 MG tablet, Take 1 tablet by mouth daily  Insulin Pen Needle (PEN NEEDLES) 32G X 6 MM MISC, Take insulin daily  Misc. Devices MISC, Please add portability to current O2 order. Resp to evaluate patient for OCD device. Misc. Devices KIT, Dispense 1 blood pressure cuff. OXYGEN, Inhale 2.5 L into the lungs nightly (Patient taking differently: Inhale 2.5 L into the lungs nightly Spouse states uses  5 L/min via nc bedtime)  acetaminophen (TYLENOL) 500 MG tablet, Take 1,000 mg by mouth daily as needed for Pain    Diet/Nutrition   ADULT DIET; Regular; 4 carb choices (60 gm/meal); Low Sodium (2 gm); Low Potassium (Less than 3000 mg/day)    Allergies   Patient has no known allergies. Social History   Tobacco   reports that he quit smoking about 18 years ago. His smoking use included cigarettes. He has a 3.50 pack-year smoking history. He quit smokeless tobacco use about 18 years ago. Alcohol     reports no history of alcohol use.     Occupational history :    Family History         Problem Relation Age of Onset    Cancer Mother         skin    Heart Disease Mother     High Blood Pressure Father     Amyotrophic lateral sclerosis Father     Cancer Brother     High Blood Pressure Brother     Diabetes Brother        Sleep History n/a    ROS   REVIEW OF SYSTEMS:  Review of systems not obtained due to patient factors - mental status    Lines and Devices    22 G peripheral left shoulder    Mechanical Ventilation Data   VENT SETTINGS (Comprehensive)     Additional Respiratory Assessments  Heart Rate: 76  Resp: 22  SpO2: 98 %    ABG  Lab Results   Component Value Date/Time    PH 7.211 2022 07:43 AM    PCO2 55.1 2022 07:43 AM    PO2 52.4 2022 07:43 AM    HCO3 21.6 2022 07:43 AM    O2SAT 83.3 2022 07:43 AM     Lab Results   Component Value Date/Time    MODE NIV PAP 15/6 2022 07:43 AM     Vitals    height is 5' 7\" (1.702 m) and weight is 239 lb (108.4 kg). His temporal temperature is 98.2 °F (36.8 °C). His blood pressure is 141/51 (abnormal) and his pulse is 76. His respiration is 22 and oxygen saturation is 98%.        Temperature Range: Temp: 98.2 °F (36.8 °C) Temp  Av °F (36.7 °C)  Min: 97.6 °F (36.4 °C)  Max: 98.2 °F (36.8 °C)  BP Range:  Systolic (44AFJ), ZFQ:825 , Min:81 , UJS:919     Diastolic (65CIN), ELS:70, Min:36, Max:65    Pulse Range: Pulse  Av.1  Min: 59  Max: 78  Respiration Range: Resp  Av.7  Min: 15  Max: 26  Current Pulse Ox[de-identified]  SpO2: 98 %  24HR Pulse Ox Range:  SpO2  Av.5 %  Min: 91 %  Max: 100 %  Oxygen Amount and Delivery: O2 Flow Rate (L/min): 6 L/min      I/O (24 Hours)    Patient Vitals for the past 8 hrs:   BP Temp Temp src Pulse Resp SpO2 Height   22 1819 (!) 141/51 98.2 °F (36.8 °C) Temporal 76 22 98 % --   22 1500 92/65 -- -- 69 22 99 % --   22 1315 -- -- -- -- -- -- 5' 7\" (1.702 m)       Intake/Output Summary (Last 24 hours) at 2022 1932  Last data filed at 2022 1859  Gross per 24 hour   Intake --   Output 2400 ml   Net -2400 ml     I/O last 3 completed shifts:  In: -   Out: 2400 [Urine:2400]   Date 22 0000 - 22 235   Shift 1924-8847 3651-6777 0794-7811 24 Hour Total   INTAKE   Shift Total(mL/kg)       OUTPUT Urine(mL/kg/hr) 700(0.8) 700(0.8) 1000 2400   Shift Total(mL/kg) 700(6.5) 700(6.5) 1000(9.2) 2400(22.1)   Weight (kg) 108.4 108.4 108.4 108.4     Patient Vitals for the past 96 hrs (Last 3 readings):   Weight   12/24/22 1732 239 lb (108.4 kg)         Drains/Tubes Outputs  Prince Catheter insert 12/24/22     Exam   PHYSICAL EXAM:  CONSTITUTIONAL:  Alert, confused, oriented to self, no acute distress  EYES:  Lids and lashes normal, pupils equal, round and reactive to light, extra ocular muscles intact, sclera clear, conjunctiva normal  ENT:  Normocephalic, without obvious abnormality, atraumatic, sinuses nontender on palpation, external ears without lesions, mucous membranes dry  LUNGS:  No increased work of breathing, good air exchange, diminished to auscultation bilaterally,   CARDIOVASCULAR:  Normal apical impulse, regular rate and rhythm, normal S1 and S2, no S3 or S4, and no murmur noted, ++ edema  ABDOMEN:  No scars, normal bowel sounds, soft, non-distended, non-tender   MUSCULOSKELETAL:  There is no redness, warmth, or swelling of the joints. Full range of motion noted. Motor strength is 5 out of 5 all extremities bilaterally.   Tone is normal.  NEUROLOGIC:  Alert and oriented to self, follows commands, moving all extremities   SKIN:  Blistering noted to left arm    Data   Old records and images have been reviewed    Lab Results   CBC     Lab Results   Component Value Date/Time    WBC 10.3 12/25/2022 05:54 AM    RBC 3.68 12/25/2022 05:54 AM    HGB 10.8 12/25/2022 05:54 AM    HCT 35.9 12/25/2022 05:54 AM     12/25/2022 05:54 AM    MCV 97.6 12/25/2022 05:54 AM    MCH 29.3 12/25/2022 05:54 AM    MCHC 30.1 12/25/2022 05:54 AM    RDW 17.3 12/25/2022 05:54 AM    SEGSPCT 68 04/27/2011 10:30 AM    LYMPHOPCT 12.7 12/25/2022 05:54 AM    MONOPCT 9.3 12/25/2022 05:54 AM    EOSPCT 7 10/05/2010 10:52 AM    BASOPCT 0.6 12/25/2022 05:54 AM    MONOSABS 0.95 12/25/2022 05:54 AM    LYMPHSABS 1.30 12/25/2022 05:54 AM EOSABS 0.29 12/25/2022 05:54 AM    BASOSABS 0.06 12/25/2022 05:54 AM       BMP   Lab Results   Component Value Date/Time     12/25/2022 06:50 PM    K 5.2 12/25/2022 06:50 PM    K 6.5 12/25/2022 05:54 AM     12/25/2022 06:50 PM    CO2 24 12/25/2022 06:50 PM    BUN 93 12/25/2022 06:50 PM    CREATININE 3.5 12/25/2022 06:50 PM    GLUCOSE 308 12/25/2022 06:50 PM    GLUCOSE 133 04/18/2012 08:43 AM    CALCIUM 9.2 12/25/2022 06:50 PM       LFTS  Lab Results   Component Value Date/Time    ALKPHOS 119 12/24/2022 05:31 PM    ALT 51 12/24/2022 05:31 PM    AST 53 12/24/2022 05:31 PM    PROT 8.3 12/24/2022 05:31 PM    BILITOT 0.7 12/24/2022 05:31 PM    BILIDIR <0.2 05/25/2017 02:00 AM    IBILI see below 05/25/2017 02:00 AM    LABALBU 4.3 12/24/2022 05:31 PM    LABALBU 4.4 10/25/2011 09:50 AM       INR  Recent Labs     12/24/22  1731   PROTIME 22.9*   INR 2.1       APTT  No results for input(s): APTT in the last 72 hours. Lactic Acid  Lab Results   Component Value Date/Time    LACTA 1.7 12/25/2022 02:25 PM    LACTA 2.4 12/24/2022 05:31 PM    LACTA 1.5 08/22/2022 12:38 PM        BNP   No results for input(s): BNP in the last 72 hours. Cultures   No results for input(s): BC in the last 72 hours. No results for input(s): Diana Munda in the last 72 hours. No results for input(s): LABURIN in the last 72 hours. Radiology   CXR  CXR 12/24/22   No acute process. CT Scans  CT Head WO Contrast 12/24/22   No acute intracranial abnormality. Chronic parenchymal change including atrophy and old white matter ischemia. CT ABD Pelvis WO IV Contrast 12/24/22   Suspect mild acute pancreatitis. Mild splenomegaly. Bilateral lower lobe atelectasis.          SYSTEMS ASSESSMENT AND PLAN    Neuro   Acute toxic encephalopathy, possibly related to uremia/infectious process/gabapentin administration in the setting of a low GFR   -CT head negative for any acute process   -Ammonia 16   -Continue to monitor neurovascular status   -Hold gabapentin     Respiratory   Acute hypoxic respiratory failure with hypercapnia, on admission, patient 84% on room air, currently on NC with pulse ox of 98%  NIKO   -Continue between Bipap and nasal cannula as patient/ABG tolerates  -Wean oxygen as tolerated. Keep O2 sat 90-92%  -Duonebs Q4 while awake  -Obtain ABG in the am    Cardiovascular   HTN  HFpEF 55-60%, proBNP 6,555, home medication jardiance  Atrial fibrillation on chronic anticoagulation  Elevated troponin, most likely type II MI  Hyperlipidemia   -Hold home antihypertensives   -Currently on bumex drip per Nephrology   -Continue apixaban   -Continue statin   -Hold jardiance    Gastrointestinal   Poor appetite, most likely 2/2 uremia   Elevated in LFT's, most likely related to hepatic congestion   -Continue to monitor LFT's  Nutrition: Carb control, low Na, Low K, fluid restriction 1.2L    Renal   RADHA, Stage III, multifactorial, most likely hemodynamically mediated due to decompensated heart failure in the setting of ACE inhibition administration and poor oral intake/diuretic administration. On admission, creatinine 5.9 mg/dL.    H/o ATN requiring temporary HD, May 2017, March 2022  CKD Stage IIIb  HAGMA, most likely 2/2 uremia and lactic acidosis  Lactic acidosis  Hyperkalemia 2/2 decreased GFR and ACE inhibition administration  H/o of vitamin d deficiency, on cholecalciferol  H/o Gout, home medication allopurinol   -Nephrology following, appreciate input   -Continue to monitor renal function, may need HD if renal function does not improve   -Bumex drip per nephrology   -Currently on sodium bicarbonate drip per nephrology   -Continue to monitor bicarbonate   -Strict I's and O's   -Hold lisinopril   -Continue to monitor potassium   -Currently getting Lokelma 10G x3 doses   -Continue cholecalciferol     Infectious Disease   UTI, complicated   -Pan cultures pending   -UA with small leukocytes esterase, few bacteria   -Start Ceftriaxone for 10 days    Hematology/Oncology   Anemia   -Obtain iron studies, currently on ferrous sulfate   -CBC daily  DVT prophylaxis: apixiaban    Endocrine   DM Type II   -hold home medication metformin   -ISS   -Hypoglycemia protocol  Hypothyroidism   -TSH 9.330   -Continue levothyroxine    Social/Spiritual/DNR/Other   Code: Full  Disposition: ICU  Fluids/lytes/nutrition:per nephrology, replace as needed, diet-carb/low na/K+      Case and plan discussed with attending Dr. Caty Ramirez, APRN - CNP    12/25/2022, 7:32 PM

## 2022-12-26 NOTE — CONSULTS
GENERAL SURGERY  CONSULT NOTE  12/26/2022    Physician Consulted: Dr. Schumacher  Reason for Consult: central line insertion  Referring Physician: Dr. Delatorre    HPI  Wander Rocha is a 67 y.o. male who presents to the general surgery service for central line insertion.  The patient is currently admitted for evaluation of altered mental status and acute hypoxic respiratory failure.  He is in a state of CHF exacerbation and RADHA on CKD.  He is currently requiring IV diuresis.  He has required hemodialysis through temporary catheters in the past.    The patient currently only has 1 functioning peripheral IV.  Multiple nurses have been unable to obtain additional access due to extensive blistering on the patient's left arm and no suitable vein in his right upper extremity.    The general surgery service is consulted for placement of a central line      EMR  reports that he quit smoking about 18 years ago. His smoking use included cigarettes. He has a 3.50 pack-year smoking history. He quit smokeless tobacco use about 18 years ago. He reports that he does not drink alcohol and does not use drugs.          Past Medical History:   Diagnosis Date    RADHA (acute kidney injury) ([facility]) 3/10/2022    Atrial fibrillation ([facility])     Carpal tunnel syndrome     Encounter regarding vascular access for dialysis for ESRD ([facility]) 3/12/2022    Hyperlipidemia     Hypertension     Hypothyroidism     Lung nodule     Nocturnal hypoxemia due to obesity 8/8/2013    Obesity     NIKO (obstructive sleep apnea) 8/8/2013    [facility]    Osteoarthritis     Pinched nerve     Lumbar    Restrictive lung disease secondary to obesity 7/25/2016    Sinus node dysfunction (HCC)     Type II or unspecified type diabetes mellitus without mention of complication, not stated as uncontrolled        Past Surgical History:   Procedure Laterality Date    BACK SURGERY  2012    [facility] Pinched nerve in back    BACK SURGERY  05/30/2017    BACK SURGERY  N/A 11/8/2022    EXCISON OF SOFT TISSUE NEOPLASM OF UPPER BACK performed by Kourtney Gutierres MD at [address]  2007    multiple colon polyps    COLONOSCOPY  06/04/2012    COLONOSCOPY    COLONOSCOPY  04/26/2022    polyp; diverticula--sarah    COLONOSCOPY N/A 4/26/2022    COLONOSCOPY POLYPECTOMY HOT BIOPSY (Snare) performed by Kourtney Gutierres MD at [address]      lennie cataracts implant    HERNIA REPAIR      umbilical    PACEMAKER PLACEMENT  10/03/2017    Medtronic dual chamber    Dr. Leo Blandon        Medications Prior to Admission:    Prior to Admission medications    Medication Sig Start Date End Date Taking? Authorizing Provider   insulin glargine (BASAGLAR KWIKPEN) 100 UNIT/ML injection pen Inject 55 Units into the skin nightly PAP medication. 11/28/22   Nii Peterson, DO   bumetanide Hern[facility] Carrera) 2 MG tablet Take 1 tablet by mouth daily 11/16/22   Bakari Blandon MD   gabapentin (NEURONTIN) 300 MG capsule Take 2 capsules by mouth in the morning and at bedtime for 90 days. 11/16/22 2/14/23  Bakari Blandon MD   mometasone-formoterol [facility]) 200-5 MCG/ACT inhaler Inhale 2 puffs into the lungs 2 times daily Rinse mouth after using. PAP medication. 11/16/22   Shant Posey MD   Insulin Pen Needle (PEN NEEDLES) 31G X 6 MM MISC Once daily. May substitute brand for insurance coverage. 11/16/22   Bakari Blandon MD   Lancets MISC Give Delica lancets. Tests twice a day A.M.  And P.M 11/16/22   Bakari Blandon MD   blood glucose test strips [facility]) strip TEST IN THE MORNING AND IN THE EVENING 11/16/22   Bakari Blandon MD   lisinopril (PRINIVIL;ZESTRIL) 5 MG tablet Take 1 tablet by mouth daily 11/16/22   Bakari Blandon MD   insulin lispro, 1 Unit Dial, ([facility] [facility]) 100 UNIT/ML SOPN Inject 20 Units into the skin 3 times daily (before meals) for 225 doses 11/10/22 1/24/23  Ozzie Gupta,    ferrous sulfate (IRON 325) 325 (65 Fe) MG tablet Take 325 mg by mouth daily (with breakfast)    Historical Provider, MD   oxyCODONE-acetaminophen (PERCOCET) 7.5-325 MG per tablet Take 1 tablet by mouth 2 times daily as needed. 10/3/22   Historical Provider, MD   allopurinol (ZYLOPRIM) 100 MG tablet Take 100 mg by mouth daily    Historical Provider, MD   Cholecalciferol (VITAMIN D) 50 MCG (2000 UT) CAPS capsule Take 2,000 Units by mouth daily    Historical Provider, MD   metFORMIN (GLUCOPHAGE) 500 MG tablet Take 1 tablet by mouth 2 times daily (with meals) 8/31/22   Radha Tovar MD   aspirin (ASPIR-LOW) 81 MG EC tablet TAKE ONE TABLET BY MOUTH EVERY DAY 8/31/22   Radha Tovar MD   zoster recombinant adjuvanted vaccine [facility]) 50 MCG/0.5ML SUSR injection 1 dose now and repeat in 2-6 months 8/31/22   Radha Tovar MD   ELIQUIS 5 MG TABS tablet Take 1 tablet by mouth in the morning and 1 tablet before bedtime. 7/18/22   Emily Klein MD   atorvastatin (LIPITOR) 40 MG tablet TAKE ONE TABLET BY MOUTH DAILY 7/18/22   Emily Klein MD   metoprolol succinate (TOPROL XL) 25 MG extended release tablet Take 1 tablet by mouth in the morning. 7/18/22   Emily Klein MD   levothyroxine (SYNTHROID) 88 MCG tablet Take 1 tablet by mouth in the morning. 7/18/22 1/14/23  Emily Klein MD   empagliflozin (JARDIANCE) 25 MG tablet Take 1 tablet by mouth daily 6/23/22 6/23/23  Elizabeth Sewell,    Insulin Pen Needle (PEN NEEDLES) 32G X 6 MM MISC Take insulin daily 2/28/22   Rajinder Pulliam MD   Misc. Devices MISC Please add portability to current O2 order. Resp to evaluate patient for OCD device. 9/15/21   Wiliam Vega MD   Misc.  Devices KIT Dispense 1 blood pressure cuff. 2/23/21   Fay Moses MD   OXYGEN Inhale 2.5 L into the lungs nightly  Patient taking differently: Inhale 2.5 L into the lungs nightly Spouse states uses  5 L/min via nc bedtime 7/28/20   Sushant Reese MD   acetaminophen (TYLENOL) 500 MG tablet Take 1,000 mg by mouth daily as needed for Pain Historical Provider, MD       No Known Allergies    Family History   Problem Relation Age of Onset    Cancer Mother         skin    Heart Disease Mother     High Blood Pressure Father     Amyotrophic lateral sclerosis Father     Cancer Brother     High Blood Pressure Brother     Diabetes Brother              Review of Systems      PHYSICAL EXAM:    Vitals:    12/26/22 0000   BP: (!) 125/52   Pulse: 72   Resp: 22   Temp: 98.6 °F (37 °C)   SpO2: 98%       General Appearance:  awake, answers some questions appropriately, oriented to person  Skin: Left upper extremity wrapped with blistering present beneath dressing. Skin color, texture, turgor normal otherwise  Head/face:  NCAT  Eyes:  No gross abnormalities. Sclera nonicteric  Lungs/Chest:  Normal expansion. No respiratory distress. On supplemental oxygen. No chest wall tenderness  Heart: Warm throughout. Regular rate   Abdomen:  Soft, no tenderness, obese. No palpable organomegaly or masses. Extremities: Extremities warm to touch, pink, with edema. LABS:    CBC  Recent Labs     12/25/22  0554   WBC 10.3   HGB 10.8*   HCT 35.9*        BMP  Recent Labs     12/25/22  2244      K 5.0      CO2 26   BUN 94*   CREATININE 3.3*   CALCIUM 9.3     Liver Function  Recent Labs     12/24/22  1731   BILITOT 0.7   AST 53*   ALT 51*   ALKPHOS 119   PROT 8.3   LABALBU 4.3     No results for input(s): LACTATE in the last 72 hours. Recent Labs     12/24/22  1731   INR 2.1         RADIOLOGY: I reviewed all relevant imaging from this admission as well as the past studies available in the EMR including multiple chest x-rays from this admission    My assessment of this imaging is: Left subclavian vein pacemaker leads present      ASSESSMENT:  69 y.o. male with CHF exacerbation and RADHA on CKD. Requiring IV diuresis. Inability to obtain sufficient peripheral IV access.     PLAN:  Will place central line at bedside  Postplacement CXR  Please call with any further questions or concerns    Electronically signed by Bryce Baxter DO on 12/26/22 at 2:53 AM EST

## 2022-12-26 NOTE — PROGRESS NOTES
[address] [facility]  Internal Medicine Residency / [address]    Attending Physician Statement  I have discussed the case, including pertinent history and exam findings with the resident and the team.  I have seen and examined the patient and the key elements of the encounter have been performed by me. I agree with the assessment, plan and orders as documented by the resident. Mental state baseline  VS stable, BP 92/45 this ANM  On Bumex drip at 0.5 mg  Cr is improving at 2.8 was >4  K is 4.9  Lungs stable 2 D ECHO EF 65%  Remains extubated and pulse Ox 99% at 4 L  Plan: Continue to monitor parameters and follow Renal recommendations  Same ICU care       Remainder of medical problems as per resident note.       Yahir Riley MD [facility] SPECIALTY [facility]  Internal Medicine Residency Faculty

## 2022-12-26 NOTE — PROGRESS NOTES
Department of Internal Medicine  Nephrology Consult Note    Events reviewed. Patient was transferred to MICU. SUBJECTIVE: We are following Mr. Poppy Stratton for RADHA on CKD. Reports no chest pain or shortness of breath.     PHYSICAL EXAM:      Vitals:    VITALS:  BP (!) 122/52   Pulse 97   Temp 98.3 °F (36.8 °C) (Temporal)   Resp 22   Ht 5' 7\" (1.702 m)   Wt 239 lb (108.4 kg)   SpO2 99%   BMI 37.43 kg/m²   24HR INTAKE/OUTPUT:    Intake/Output Summary (Last 24 hours) at 12/26/2022 9090  Last data filed at 12/26/2022 0557  Gross per 24 hour   Intake --   Output 6010 ml   Net -6010 ml         Constitutional: Awake, NAD  HEENT: Atraumatic, normocephalic, PERRLA  Respiratory: CTA  Cardiovascular/Edema: RRR, normal S1, normal S2  Gastrointestinal: Soft, nondistended, nontender  Neurologic: Focal  Skin:, Dry, no rashes, no lesions    Scheduled Meds:   [Held by provider] allopurinol  100 mg Oral Daily    aspirin  81 mg Oral Daily    atorvastatin  40 mg Oral Daily    [Held by provider] bumetanide  2 mg Oral Daily    vitamin D  2,000 Units Oral Daily    apixaban  5 mg Oral BID    ferrous sulfate  325 mg Oral Daily with breakfast    levothyroxine  88 mcg Oral Daily    [Held by provider] lisinopril  5 mg Oral Daily    [Held by provider] metoprolol succinate  25 mg Oral Daily    [Held by provider] insulin glargine-yfgn  20 Units SubCUTAneous Nightly    cefTRIAXone (ROCEPHIN) IV  1,000 mg IntraVENous Q24H    [Held by provider] gabapentin  600 mg Oral Daily    insulin lispro  0-8 Units SubCUTAneous TID     insulin lispro  0-4 Units SubCUTAneous Nightly     Continuous Infusions:   dextrose      bumetanide 0.1 mg/mL infusion 0.5 mg/hr (12/25/22 2112)    sodium chloride       PRN Meds:.[Held by provider] oxyCODONE-acetaminophen **AND** [Held by provider] oxyCODONE, glucose, dextrose bolus **OR** dextrose bolus, glucagon (rDNA), dextrose, albuterol, [DISCONTINUED] dextrose bolus **OR** dextrose bolus, sodium chloride flush, sodium chloride, ondansetron **OR** ondansetron, polyethylene glycol, acetaminophen **OR** acetaminophen      DATA:    CBC with Differential:    Lab Results   Component Value Date/Time    WBC 8.1 12/26/2022 05:14 AM    RBC 3.48 12/26/2022 05:14 AM    HGB 10.2 12/26/2022 05:14 AM    HCT 32.2 12/26/2022 05:14 AM     12/26/2022 05:14 AM    MCV 92.5 12/26/2022 05:14 AM    MCH 29.3 12/26/2022 05:14 AM    MCHC 31.7 12/26/2022 05:14 AM    RDW 17.2 12/26/2022 05:14 AM    SEGSPCT 68 04/27/2011 10:30 AM    LYMPHOPCT 9.6 12/26/2022 05:14 AM    MONOPCT 9.5 12/26/2022 05:14 AM    EOSPCT 7 10/05/2010 10:52 AM    BASOPCT 0.5 12/26/2022 05:14 AM    MONOSABS 0.77 12/26/2022 05:14 AM    LYMPHSABS 0.78 12/26/2022 05:14 AM    EOSABS 0.41 12/26/2022 05:14 AM    BASOSABS 0.04 12/26/2022 05:14 AM     CMP:    Lab Results   Component Value Date/Time     12/26/2022 05:14 AM    K 4.9 12/26/2022 05:14 AM    K 6.5 12/25/2022 05:54 AM     12/26/2022 05:14 AM    CO2 30 12/26/2022 05:14 AM    BUN 92 12/26/2022 05:14 AM    CREATININE 2.8 12/26/2022 05:14 AM    GFRAA >60 10/03/2022 10:55 AM    LABGLOM 24 12/26/2022 05:14 AM    GLUCOSE 218 12/26/2022 05:14 AM    GLUCOSE 133 04/18/2012 08:43 AM    PROT 7.2 12/26/2022 05:14 AM    LABALBU 3.6 12/26/2022 05:14 AM    LABALBU 4.4 10/25/2011 09:50 AM    CALCIUM 9.1 12/26/2022 05:14 AM    BILITOT 0.6 12/26/2022 05:14 AM    ALKPHOS 105 12/26/2022 05:14 AM    AST 31 12/26/2022 05:14 AM    ALT 52 12/26/2022 05:14 AM     Magnesium:    Lab Results   Component Value Date/Time    MG 2.0 12/26/2022 05:14 AM     Phosphorus:    Lab Results   Component Value Date/Time    PHOS 5.3 12/26/2022 05:14 AM     Radiology Review:      Kidney ultrasound December 25, 2022   1. Bilateral renal cortical thinning. There is no hydronephrosis on either   side. 2.  A Prince catheter is decompressing the bladder. CT abdomen and pelvis 12/24/2022   Suspect mild acute pancreatitis.        Mild splenomegaly. Bilateral lower lobe atelectasis. CXR 12/24/2022   No acute process. BRIEF SUMMARY OF INITIAL CONSULT:    Briefly, Denise Ralph is a 66-year-old male known to us, past medical history CKD stage 3B probably 2/2 nephrosclerosis and previous episode of ischemic ATN requiring temporary HD in May 2017, recurrent episode of ischemic ATN in March 2022 also requiring HD x2 with fair recovery of renal function, baseline creatinine 1.4 to 1.5 mg/dL, HTN, DM type II, A. fib on apixaban, HFpEF 55 to 60%, NIKO, pacemaker placement, hypothyroidism, hyperlipidemia, who presented to the ED on 12/24/2022 via EMS from home for altered mental status and decreased appetite. ED work-up found patient hypoxic, initial lab work revealed potassium level 7.5, creatinine 5.9 with , reasons for this consultation. Hyperkalemia protocol given in ED. Home medications include lisinopril, bumetanide, gabapentin, metformin, empagliflozin, metoprolol. IMPRESSION/RECOMMENDATIONS:        RADHA, stage III likely prerenal RADHA 2/2 intravascular volume depletion from poor oral intake in the setting of ACE inhibition versus cardiorenal syndrome. Kidney ultrasound and CT abdomen and pelvis unrevealing. Renal function improved since admission, creatinine down to 2.8 mg/dL with excellent urine output >6 L with Bumex drip. CKD stage IIIb likely 2/2 nephrosclerosis and previous episodes of ischemic ATN requiring HD x2 separate occasions. Baseline creatinine 1.4 to 1.5 mg/dL    Hyperkalemia 2/2 decreased GFR and lisinopril. Resolved.   HTN, on metoprolol  HFpEF 55 to 60%, proBNP 6555> 4125, on Bumex drip  Normal pH with metabolic alkalosis (2/2 bicarb administration)  ------------------------------------------------------------------------------------  Encephalopathy, 2/2 uremia versus medication toxicity (gabapentin), hold gabapentin in view of severely decreased GFR  UTI, on ceftriaxone  Atrial fibrillation, on metoprolol and apixaban  Type II DM, holding metformin  Hypothyroidism on levothyroxine  NIKO        Plan:    Discontinue bicarbonate drip  Discontinue Bumex drip  Bumex 2 mg p.o. twice daily starting tomorrow  Continue to monitor renal function      Electronically signed by Heber Key MD on 12/26/2022 at 9:11 AM

## 2022-12-26 NOTE — PROGRESS NOTES
SPEECH/LANGUAGE PATHOLOGY  CLINICAL ASSESSMENT OF SWALLOWING FUNCTION   and PLAN OF CARE    PATIENT NAME:  May Malone  (male)     MRN:  [id number]    :  1955  (69 y.o.)  STATUS:  Inpatient: Room 4403/4403-A    TODAY'S DATE:  2022  REFERRING PROVIDER:   JACQUELINE Thompson  SPECIFIC PROVIDER ORDER: SLP swallowing-dysphagia evaluation and treatment Date of order:  22  REASON FOR REFERRAL: To assess oropharyngeal swallow fx; coughing noted with water per nursing staff. EVALUATING THERAPIST: KATIA Conn                 RESULTS:    DYSPHAGIA DIAGNOSIS:   Clinical indicators of moderate oral phase dysphagia  and suspected mild  pharyngeal phase dysphagia       DIET RECOMMENDATIONS:  Pureed consistency solids (IDDSI level 4) with  nectar consistency (mildly thick - IDDSI level 2) liquids, as tolerated, MEDICATION ADMINISTRATION, and Administer medication crushed, as able, with pudding/applesauce     FEEDING RECOMMENDATIONS:     Assistance level:  Stand by assistance is needed during all oral intake, Supervision is needed during all oral intake      Compensatory strategies recommended: Small bites/sips, Alternate solids and liquids, and No straw      Discussed recommendations with nursing and/or faxed report to referring provider: Yes; informed pt's nurse re: rec for puree, nectar thick, of rec for supervision during PO intake, and that if aspiration is suspected, an MBSS is recommended.      SPEECH THERAPY  PLAN OF CARE   The dysphagia POC is established based on physician order, dysphagia diagnosis and results of clinical assessment     Skilled SLP intervention for dysphagia management on acute care 3-5 x per week until goals met, pt plateaus in function and/or discharged from hospital    Conditions Requiring Skilled Therapeutic Intervention for dysphagia:    Patient is performing below functional baseline d/t  current acute condition, Multiple diagnoses, multiple medications, and increased dependency upon caregivers. Throat clearing during PO intake   Coughing during PO intake      Specific dysphagia interventions to include:     Compensatory strategy training   Trials of upgraded diet/liquid IF appropriate   Meal time assessment for 1-2 sessions to provide diet modification and compensatory strategy implementation due to need to ensure proper implementation of compensatory strategies during PO intake     Specific instructions for next treatment:  development and training of compensatory swallow strategies to improve airway protection and swallow function  Patient Treatment Goals:    Short Term Goals:  Pt will implement identified compensatory swallowing strategies on 90% of opportunities or greater to improve airway protection and swallow function. Pt will participate in ongoing mealtime assessment to provide diet modification and compensatory strategy implementation to minimize risk of aspiration associated with PO intake  If appropriate, pt will complete PO trials of upgraded diet textures with SLP only to determine the least restrictive PO diet to maintain adequate nutrition/hydration with no more than 1 overt s/s of pen/asp. Pt will participate in meal time assessment for 1-2 sessions to provide diet modification and compensatory strategy implementation due to need to ensure proper implementation of compensatory strategies during PO intake     Long Term Goals:   Pt will maintain adequate nutrition/hydration via PO intake of the least restrictive oral diet with implementation of safe swallow/ compensatory strategies and decrease signs/symptoms of aspiration to less than 1 x/day. OTHER RECOMMENDATIONS:  A Video Swallow Study (MBSS) is recommended and requires a physician order IF aspiration or silent aspiration is suspected based on medical presentation     Patient/family Goal:    Did not state. Will further assess during treatment.     Plan of care discussed with Patient   The Patient did not demonstrate complete understanding of the diagnosis, prognosis and plan of care     Rehabilitation Potential/Prognosis: fair                    ADMITTING DIAGNOSIS: Dehydration [E86.0]  Hyperkalemia [E87.5]  RADHA (acute kidney injury) ([facility] [N17.9]  Altered mental status, unspecified altered mental status type [R41.82]  Class 2 severe obesity with serious comorbidity in adult, unspecified BMI, unspecified obesity type ([address] [E66.01]    VISIT DIAGNOSIS:   Visit Diagnoses         Codes    Dehydration     E86.0    Altered mental status, unspecified altered mental status type     R41.82    RADHA (acute kidney injury) ([facility]     N17.9    Class 2 severe obesity with serious comorbidity in adult, unspecified BMI, unspecified obesity type ([facility]     E66.01             PATIENT REPORT/COMPLAINT: patient not able to accurately report  RN cleared patient for participation in assessment     yes-nursing staff reported pt coughing with thin liquid intake. PRIOR LEVEL OF SWALLOW FUNCTION:    PAST HISTORY OF DYSPHAGIA?: none reported-per therapy staff, nursing staff spoke w/ pt's daughter who reported that when pt is altered/is hospitalized, that pt typically is on nectar thick liquids. Home diet: Diet information not available     Current Diet Order:  ADULT DIET; Regular; 4 carb choices (60 gm/meal); Low Sodium (2 gm);  Low Potassium (Less than 3000 mg/day); 1200 ml    PROCEDURE:  Consistencies Administered During the Evaluation   Liquids: thin liquid and nectar thick liquid   Solids:  pureed foods and solid foods      Method of Intake:   cup, spoon  Fed by clinician      Position:   Seated, upright    CLINICAL ASSESSMENT:  Oral Stage:       Dentition:  some missing teeth noted   Decreased mastication due to:  poor/missing dentition  , decreased lingual control, and cognitive function  Delayed A-P transit due to: reduced lingual strength  and cognitive function     Mildly prolonged oral stage with puree with min to no oral residue noted w/ puree. Prolonged/effortful mastication noted w/ reg solid with varying oral residue (pt spontaneously swallowed few times with X1 solid trial). SLP used dental swab to clear oral residue of solids. Pt reported solid to be difficult to chew. Pharyngeal Stage:    Difficult to assess, as pt presented with occasional baseline dry throat clearing prior to PO trials presented. SLP informed pt's nurse of this and if aspiration is suspected, an MBSS is recommended. Immediate dry Throat clearing present after presentation of thin liquid and solid foods  Multiple swallows were noted after presentation of thin liquid and solid foods  W/ nectar thick from cup: pt tolerated 5/6 sips w/o overt s/s of aspiration noted; X1 delayed dry throat clear noted (unclear if habitual or related to PO trial)    No other signs of aspiration were noted during this evaluation (w/ nectar, puree) however, silent aspiration cannot be ruled out at bedside. If silent aspiration is suspected, a Videofluoroscopic Study of Swallowing (MBS) is recommended and requires a physician order. Cognition:   Follows 1 - step directions appropriate for this assessment and Confusion noted    Oral Peripheral Examination   Generalized oral weakness    Dried secretions around oral cavity noted-SLP informed pt's nurse of this. Current Respiratory Status    3 liters O2 via nasal cannula     Parameters of Speech Production  Respiration:  Adequate for speech production  Quality:   Within functional limits  Intensity: Within functional limits    Volitional Swallow: not able to elicit     Volitional Cough:   DNT    Pain: No pain reported. EDUCATION:   The Speech Language Pathologist (SLP) completed education regarding results of evaluation and that intervention is warranted at this time.   Learner: Patient  Education: Reviewed results and recommendations of this evaluation, Reviewed diet and strategies, and Reviewed signs, symptoms and risks of aspiration  Evaluation of Education:  Needs further instruction    This plan may be re-evaluated and revised as warranted. Evaluation Time includes thorough review of current medical information, gathering information on past medical history/social history and prior level of function, completion of standardized testing/informal observation of tasks, assessment of data and education on plan of care and goals. [x]The admitting diagnosis and active problem list, have been reviewed prior to initiation of this evaluation.         ACTIVE PROBLEM LIST:   Patient Active Problem List   Diagnosis    DM (diabetes mellitus) ([facility]    Class 2 obesity due to excess calories with serious comorbidity and body mass index (BMI) of 38.0 to 38.9 in adult    Hyperlipidemia    Essential hypertension    Hypothyroidism    Lung nodules    Arthritis of shoulder region, left    OA (osteoarthritis of the spine)    S/P laminectomy with spinal fusion    Colorectal polyps    Diverticula of colon    NIKO (obstructive sleep apnea)    Hypoxemia    Restrictive lung disease secondary to obesity    Atypical atrial flutter (HCC)    Sinus node dysfunction (HCC)    Pacemaker    Persistent atrial fibrillation (HCC)    Diabetes mellitus type 2 in obese (HCC)    Chronic obstructive pulmonary disease, unspecified COPD type ([facility]    Acute heart failure with preserved ejection fraction (HFpEF) ([address]    Chronic hypercapnic respiratory failure ([address]    Encounter regarding vascular access for dialysis for ESRD ([address]    At risk for colon cancer    Acute on chronic congestive heart failure (HCC)    Acute on chronic diastolic heart failure (HCC)    Normocytic anemia    Stage 3b chronic kidney disease ([address]    Acute on chronic heart failure with preserved ejection fraction (HCC)    Chronic kidney disease, stage 2 (mild)    Hypercalcemia    Proteinuria due to type 2 diabetes mellitus ([address]    Skin lesion of face    Hyperkalemia         CPT code:  68 21 97  bedside swallow eval    Tx note (63720): SLP educated pt re: overt s/s of aspiration and on current diet rec for puree, nectar thick with rationale for such. Questionable pt comprehension of edu provided. Pt did state, \"I'd like a big gulp of water\"-SLP educated pt re: comp swallow strats including small bites/sips to assist with maintaining safety of PO intake. Materials discarded and pt left in room w/ callbell w/I reach following.

## 2022-12-26 NOTE — PROCEDURES
Jane Mathews is a 69 y.o. male patient. 1. Hyperkalemia    2. Dehydration    3. Altered mental status, unspecified altered mental status type    4. RADHA (acute kidney injury) ([facility]    5. Class 2 severe obesity with serious comorbidity in adult, unspecified BMI, unspecified obesity type ([facility]      Past Medical History:   Diagnosis Date    RADHA (acute kidney injury) ([facility] 3/10/2022    At[facility] fibrillation [facility]     Carpal tunnel syndrome     Encounter regarding vascular access for dialysis for ESRD ([facility] 3/12/2022    Hyperlipidemia     Hypertension     Hypothyroidism     Lung nodule     Nocturnal hypoxemia due to obesity 8/8/2013    Obesity     NIKO (obstructive sleep apnea) 8/8/2013    [facility]    Osteoarthritis     Pinched nerve     Lumbar    Restrictive lung disease secondary to obesity 7/25/2016    Sinus node dysfunction (HCC)     Type II or unspecified type diabetes mellitus without mention of complication, not stated as uncontrolled      Blood pressure (!) 123/58, pulse 86, temperature 98.3 °F (36.8 °C), temperature source Temporal, resp. rate 18, height 5' 7\" (1.702 m), weight 239 lb (108.4 kg), SpO2 98 %. Central Line    Date/Time: 12/26/2022 5:17 AM  Performed by: Mathieu Saavedra DO  Authorized by: Mathiue Saavedra DO   Consent: Written consent obtained.   Risks and benefits: risks, benefits and alternatives were discussed  Imaging studies: imaging studies available  Required items: required blood products, implants, devices, and special equipment available  Patient identity confirmed: verbally with patient, arm band, provided demographic data and hospital-assigned identification number  Indications: vascular access    Anesthesia:  Local Anesthetic: lidocaine 1% without epinephrine  Preparation: skin prepped with 2% chlorhexidine  Skin prep agent dried: skin prep agent completely dried prior to procedure  Sterile barriers: all five maximum sterile barriers used - cap, mask, sterile gown, sterile gloves, and large sterile sheet  Hand hygiene: hand hygiene performed prior to central venous catheter insertion  Location details: right subclavian  Patient position: flat  Catheter type: triple lumen  Catheter size: 7 Fr  Pre-procedure: landmarks identified  Ultrasound guidance: no  Number of attempts: 1  Successful placement: yes  Post-procedure: line sutured and dressing applied  Assessment: blood return through all ports, free fluid flow and placement verified by x-ray  Patient tolerance: patient tolerated the procedure well with no immediate complications    Dr. Brandon Block was present for the procedure    Rohan Thomas DO  12/26/2022

## 2022-12-27 ENCOUNTER — APPOINTMENT (OUTPATIENT)
Dept: GENERAL RADIOLOGY | Age: 67
End: 2022-12-27
Payer: MEDICARE

## 2022-12-27 LAB
ALBUMIN SERPL-MCNC: 3.6 G/DL (ref 3.5–5.2)
ALP BLD-CCNC: 111 U/L (ref 40–129)
ALT SERPL-CCNC: 46 U/L (ref 0–40)
ANION GAP SERPL CALCULATED.3IONS-SCNC: 15 MMOL/L (ref 7–16)
AST SERPL-CCNC: 26 U/L (ref 0–39)
BASOPHILS ABSOLUTE: 0.05 E9/L (ref 0–0.2)
BASOPHILS RELATIVE PERCENT: 0.6 % (ref 0–2)
BILIRUB SERPL-MCNC: 0.7 MG/DL (ref 0–1.2)
BUN BLDV-MCNC: 77 MG/DL (ref 6–23)
CALCIUM IONIZED: 1.22 MMOL/L (ref 1.15–1.33)
CALCIUM SERPL-MCNC: 9.3 MG/DL (ref 8.6–10.2)
CHLORIDE BLD-SCNC: 101 MMOL/L (ref 98–107)
CO2: 32 MMOL/L (ref 22–29)
CREAT SERPL-MCNC: 2 MG/DL (ref 0.7–1.2)
EOSINOPHILS ABSOLUTE: 0.39 E9/L (ref 0.05–0.5)
EOSINOPHILS RELATIVE PERCENT: 5 % (ref 0–6)
GFR SERPL CREATININE-BSD FRML MDRD: 35 ML/MIN/1.73
GLUCOSE BLD-MCNC: 193 MG/DL (ref 74–99)
HCT VFR BLD CALC: 33.6 % (ref 37–54)
HEMOGLOBIN: 10.5 G/DL (ref 12.5–16.5)
IMMATURE GRANULOCYTES #: 0.08 E9/L
IMMATURE GRANULOCYTES %: 1 % (ref 0–5)
LYMPHOCYTES ABSOLUTE: 0.86 E9/L (ref 1.5–4)
LYMPHOCYTES RELATIVE PERCENT: 10.9 % (ref 20–42)
MAGNESIUM: 1.8 MG/DL (ref 1.6–2.6)
MCH RBC QN AUTO: 29 PG (ref 26–35)
MCHC RBC AUTO-ENTMCNC: 31.3 % (ref 32–34.5)
MCV RBC AUTO: 92.8 FL (ref 80–99.9)
METER GLUCOSE: 190 MG/DL (ref 74–99)
METER GLUCOSE: 240 MG/DL (ref 74–99)
MONOCYTES ABSOLUTE: 0.68 E9/L (ref 0.1–0.95)
MONOCYTES RELATIVE PERCENT: 8.7 % (ref 2–12)
NEUTROPHILS ABSOLUTE: 5.8 E9/L (ref 1.8–7.3)
NEUTROPHILS RELATIVE PERCENT: 73.8 % (ref 43–80)
PDW BLD-RTO: 16.6 FL (ref 11.5–15)
PHOSPHORUS: 3.8 MG/DL (ref 2.5–4.5)
PLATELET # BLD: 248 E9/L (ref 130–450)
PMV BLD AUTO: 9.7 FL (ref 7–12)
POTASSIUM SERPL-SCNC: 4 MMOL/L (ref 3.5–5)
PRO-BNP: 3083 PG/ML (ref 0–125)
RBC # BLD: 3.62 E12/L (ref 3.8–5.8)
SODIUM BLD-SCNC: 148 MMOL/L (ref 132–146)
TOTAL PROTEIN: 7.4 G/DL (ref 6.4–8.3)
URINE CULTURE, ROUTINE: NORMAL
WBC # BLD: 7.9 E9/L (ref 4.5–11.5)

## 2022-12-27 PROCEDURE — 99231 SBSQ HOSP IP/OBS SF/LOW 25: CPT | Performed by: INTERNAL MEDICINE

## 2022-12-27 PROCEDURE — 82962 GLUCOSE BLOOD TEST: CPT

## 2022-12-27 PROCEDURE — 6360000002 HC RX W HCPCS: Performed by: NURSE PRACTITIONER

## 2022-12-27 PROCEDURE — 2580000003 HC RX 258

## 2022-12-27 PROCEDURE — 74230 X-RAY XM SWLNG FUNCJ C+: CPT

## 2022-12-27 PROCEDURE — S5553 INSULIN LONG ACTING 5 U: HCPCS | Performed by: STUDENT IN AN ORGANIZED HEALTH CARE EDUCATION/TRAINING PROGRAM

## 2022-12-27 PROCEDURE — 80053 COMPREHEN METABOLIC PANEL: CPT

## 2022-12-27 PROCEDURE — 2700000000 HC OXYGEN THERAPY PER DAY

## 2022-12-27 PROCEDURE — 84100 ASSAY OF PHOSPHORUS: CPT

## 2022-12-27 PROCEDURE — 6370000000 HC RX 637 (ALT 250 FOR IP): Performed by: INTERNAL MEDICINE

## 2022-12-27 PROCEDURE — 92526 ORAL FUNCTION THERAPY: CPT

## 2022-12-27 PROCEDURE — 94660 CPAP INITIATION&MGMT: CPT

## 2022-12-27 PROCEDURE — 6370000000 HC RX 637 (ALT 250 FOR IP): Performed by: STUDENT IN AN ORGANIZED HEALTH CARE EDUCATION/TRAINING PROGRAM

## 2022-12-27 PROCEDURE — 1200000000 HC SEMI PRIVATE

## 2022-12-27 PROCEDURE — 83735 ASSAY OF MAGNESIUM: CPT

## 2022-12-27 PROCEDURE — 6360000002 HC RX W HCPCS

## 2022-12-27 PROCEDURE — 6370000000 HC RX 637 (ALT 250 FOR IP): Performed by: FAMILY MEDICINE

## 2022-12-27 PROCEDURE — 85025 COMPLETE CBC W/AUTO DIFF WBC: CPT

## 2022-12-27 PROCEDURE — 82330 ASSAY OF CALCIUM: CPT

## 2022-12-27 PROCEDURE — 72020 X-RAY EXAM OF SPINE 1 VIEW: CPT

## 2022-12-27 PROCEDURE — 2580000003 HC RX 258: Performed by: NURSE PRACTITIONER

## 2022-12-27 PROCEDURE — 83880 ASSAY OF NATRIURETIC PEPTIDE: CPT

## 2022-12-27 PROCEDURE — 92611 MOTION FLUOROSCOPY/SWALLOW: CPT

## 2022-12-27 RX ORDER — INSULIN GLARGINE-YFGN 100 [IU]/ML
5 INJECTION, SOLUTION SUBCUTANEOUS NIGHTLY
Status: DISCONTINUED | OUTPATIENT
Start: 2022-12-27 | End: 2022-12-30 | Stop reason: HOSPADM

## 2022-12-27 RX ADMIN — HEPARIN 300 UNITS: 100 SYRINGE at 08:04

## 2022-12-27 RX ADMIN — WATER 1000 MG: 1 INJECTION INTRAMUSCULAR; INTRAVENOUS; SUBCUTANEOUS at 21:39

## 2022-12-27 RX ADMIN — APIXABAN 5 MG: 5 TABLET, FILM COATED ORAL at 08:03

## 2022-12-27 RX ADMIN — FERROUS SULFATE TAB 325 MG (65 MG ELEMENTAL FE) 325 MG: 325 (65 FE) TAB at 08:04

## 2022-12-27 RX ADMIN — APIXABAN 5 MG: 5 TABLET, FILM COATED ORAL at 21:39

## 2022-12-27 RX ADMIN — ATORVASTATIN CALCIUM 40 MG: 40 TABLET, FILM COATED ORAL at 08:03

## 2022-12-27 RX ADMIN — BUMETANIDE 2 MG: 1 TABLET ORAL at 08:03

## 2022-12-27 RX ADMIN — SODIUM CHLORIDE, PRESERVATIVE FREE 10 ML: 5 INJECTION INTRAVENOUS at 21:46

## 2022-12-27 RX ADMIN — Medication 2000 UNITS: at 08:03

## 2022-12-27 RX ADMIN — ACETAMINOPHEN 650 MG: 325 TABLET ORAL at 21:43

## 2022-12-27 RX ADMIN — HEPARIN 300 UNITS: 100 SYRINGE at 21:46

## 2022-12-27 RX ADMIN — INSULIN GLARGINE-YFGN 5 UNITS: 100 INJECTION, SOLUTION SUBCUTANEOUS at 21:39

## 2022-12-27 RX ADMIN — LEVOTHYROXINE SODIUM 88 MCG: 0.09 TABLET ORAL at 08:03

## 2022-12-27 ASSESSMENT — PAIN DESCRIPTION - LOCATION: LOCATION: BACK

## 2022-12-27 ASSESSMENT — PAIN DESCRIPTION - ORIENTATION: ORIENTATION: LOWER

## 2022-12-27 ASSESSMENT — PAIN SCALES - GENERAL
PAINLEVEL_OUTOF10: 0
PAINLEVEL_OUTOF10: 0
PAINLEVEL_OUTOF10: 8

## 2022-12-27 ASSESSMENT — PAIN DESCRIPTION - DESCRIPTORS: DESCRIPTORS: ACHING;DISCOMFORT

## 2022-12-27 NOTE — CARE COORDINATION
12/27/22 Transition of Care: Patient is here due to confusion at home. Wife states patient getting worse at home. He lives with his wife in a two story home with steps to enter. He does have safety rails. His bed/bath are on the second floor. He sleeps in a recliner. His wife works. He ambulates with a cane. He follows at the [facility] ambulatory clinic and his pharmacy is [facility] in [place]. He is currently still confused. He is climbing out of bed at times. He has dario/pulm consults. He has a central line with subq emphysema noted around site. He is on 4lnc/bipap. He has an indwelling vallecillo. He is NPO and to have a barium swallow. IV bicarb is now off. Cervical spine films pending. He remains on iv rocephin qd. PT/OT are pending and were ordered on 12/24. Plan per patients wife is based on the therapies. Message left for wife requesting a return call to talk about discharge.  Electronically signed by Foreign Lakhani RN CM on 12/27/2022 at 1:07 PM

## 2022-12-27 NOTE — PROGRESS NOTES
Pt was pulling of O2 tubing, vallecillo and pulling at IV tubing coming from bottoms of IV pole and took off his gown and leds. Pt was asked to keep them on and he stated \" I am not going to wear them I am going home. \" Pt was attempted to grab nurse this during care.  Arsalan Sutton was made aware

## 2022-12-27 NOTE — PROGRESS NOTES
Spoke to primary at bedside regarding central line. Patients left arm is blistered for infiltrated IV and is a very hard stick. Will discuss further.

## 2022-12-27 NOTE — PROGRESS NOTES
[facility]  Internal Medicine Residency Program  Progress Note - House Team 1    Patient:  Saima Douglas 69 y.o. male MRN: [id number]     Date of Service: 12/27/2022     CC: AMS      Subjective   Overnight events: No acute events overnight. Patient was trying to pull at lines and became agitated that restraints were needed. Patient seen and examined at bedside this morning in no acute distress. There is some soft swelling for concerns of a possible hematoma around the neck. Patient has no complaints and states he is doing good and wants to know when he can go home. He is alert and oriented to self and place but not able to answer on time. Denies any chest pain, fever, chills, abdominal pain, shortness of breath, constipation or diarrhea. Objective     Physical Exam:  Vitals: BP (!) 144/67   Pulse 82   Temp 98.1 °F (36.7 °C) (Temporal)   Resp 16   Ht 5' 7\" (1.702 m)   Wt 239 lb (108.4 kg)   SpO2 96%   BMI 37.43 kg/m²     I & O - 24hr: No intake/output data recorded. General Appearance: alert and cooperative  HEENT:  Head: Normal, normocephalic, atraumatic. Neck: no adenopathy, no JVD, supple, symmetrical, trachea midline, and thyroid not enlarged, symmetric, no tenderness/mass/nodules ;   Lung: clear to auscultation bilaterally  Heart: regular rate and rhythm, S1, S2 normal, no murmur, click, rub or gallop  Abdomen: soft, non-tender; bowel sounds normal; no masses,  no organomegaly  Extremities:   left upper has large fluid filled boula along medial aspect; right upper extremity has ecchymosis that is non-tender  Musculokeletal: No joint swelling, no muscle tenderness. ROM normal in all joints of extremities.    Neurologic: Mental status: Alert, oriented, thought content appropriate  Subject  Pertinent Labs & Imaging Studies   carmela  CBC with Differential:    Lab Results   Component Value Date/Time    WBC 7.9 12/27/2022 05:30 AM    RBC 3.62 12/27/2022 05:30 AM    HGB 10.5 12/27/2022 05:30 AM    HCT 33.6 12/27/2022 05:30 AM     12/27/2022 05:30 AM    MCV 92.8 12/27/2022 05:30 AM    MCH 29.0 12/27/2022 05:30 AM    MCHC 31.3 12/27/2022 05:30 AM    RDW 16.6 12/27/2022 05:30 AM    SEGSPCT 68 04/27/2011 10:30 AM    LYMPHOPCT 10.9 12/27/2022 05:30 AM    MONOPCT 8.7 12/27/2022 05:30 AM    EOSPCT 7 10/05/2010 10:52 AM    BASOPCT 0.6 12/27/2022 05:30 AM    MONOSABS 0.68 12/27/2022 05:30 AM    LYMPHSABS 0.86 12/27/2022 05:30 AM    EOSABS 0.39 12/27/2022 05:30 AM    BASOSABS 0.05 12/27/2022 05:30 AM     BMP:    Lab Results   Component Value Date/Time     12/27/2022 05:30 AM    K 4.0 12/27/2022 05:30 AM    K 6.5 12/25/2022 05:54 AM     12/27/2022 05:30 AM    CO2 32 12/27/2022 05:30 AM    BUN 77 12/27/2022 05:30 AM    LABALBU 3.6 12/27/2022 05:30 AM    LABALBU 4.4 10/25/2011 09:50 AM    CREATININE 2.0 12/27/2022 05:30 AM    CALCIUM 9.3 12/27/2022 05:30 AM    GFRAA >60 10/03/2022 10:55 AM    LABGLOM 35 12/27/2022 05:30 AM    GLUCOSE 193 12/27/2022 05:30 AM    GLUCOSE 133 04/18/2012 08:43 AM     Magnesium:    Lab Results   Component Value Date/Time    MG 1.8 12/27/2022 05:30 AM     Phosphorus:    Lab Results   Component Value Date/Time    PHOS 3.8 12/27/2022 05:30 AM       Resident's Assessment and Plan         1. Acute encephalopathy 2/2 multifactorial, uremia vs hyperglycemia    -CT of the head negative for acute abnormalities   -Home gabapentin on hold   - on admission, currently downtrending   -Monitor mentation    2. Acute Hypoxic Respiratory Failure 2/2 CHF exacerbation    -proBNP elevated on admission, clinically hypervolemic   -On 4 L nasal cannula   -Monitor respiratory status and wean O2 as tolerated    3. HFpEF     -EF 65% with stage II diastolic dysfunction    -on GDMT - recently started on Jardiance, Entresto and Toprolol XL    -Bumex drip discontinued 12/26   -P.o.  Bumex 2 mg twice daily   -Cardiology consulted-follow recommendations   -Follows with CHF clinic as outpatient    4. Atrial fibrillations with pacemaker    - On anticoagulation with Eliquis at home; continue    5. Hx Hypertension    -Stable, on metoprolol at home   -Hold home medication with low BP   -Monitor vital signs    6. Hyperlipidemia    -On statin at home; continue     7. Stage III RADHA on CKD 2/2 possible cardiorenal syndrome    -baseline Cr 1.4-1.5    -currently downtrending Cr to 2 today    -Nephrology consulted-follow recommendations    8. CKD stage IIIb likely 2/2 neprhosclerosis and previous episodes of ischemic ATN requiring Hdx2    -Baseline Cr 1.4 -1.58. 9.Uremia 2/2 hypertension    -BUN down trending; on admission 103   -Today BUN 77     10. UTI    -UA showed leukocyte esterase and bacteria    -started on Ceftriaxone    11. Type 2 DM   -On metformin at home; currently on hold due while inpatient   -On medium dose sliding scale  -Monitor blood glucose AC at bedtime    12. Hx Hypothyroidism   - TSH 9.3, fT4 1.08; continue home dose synthroid 88 mcg        PT/OT evaluation: ordered  DVT prophylaxis/ GI prophylaxis: Eliquis/ diet   Disposition: continue current care     Leonardo Arthur MD, PGY-1  Attending physician: Dr. Sherron Carias

## 2022-12-27 NOTE — PROGRESS NOTES
[address]   Internal Medicine Residency / [address]    Attending Physician Statement  I have discussed the case, including pertinent history and exam findings with the resident and the team.  I have seen and examined the patient and the key elements of the encounter have been performed by me. I agree with the assessment, plan and orders as documented by the resident. More alert today  VS stable   BS controlled   Cr 2.0. K is 4.9  PCO2 45   BULLA noted left arm , may have been from pressure  Mild sundowning last night  Conjunctival suffusion noted and mild  No asterixis   Immobile   Fullness in supraclavicular fossas may be fat  Impression ; Acute Lung and Renal Failure                      Resolving                       LV diastolic dysfunction  Plan: Continue same and Eliquis 5 mg bid for A Fib    Remainder of medical problems as per resident note.       Tamera Collins MD  Internal Medicine Residency Faculty

## 2022-12-27 NOTE — PROGRESS NOTES
Confused. Screaming \"help me\". States that he will die without any water. On fluid restriction. Patient educated but does not understand.

## 2022-12-27 NOTE — CARE COORDINATION
12/27/22 Update CM Note: Patients wife is here to visit and states she wishes for a referral to [facility] in [facility] as she resides in [place]. Referral sent to [facility]. She states she will review a list if [facility] cannot accept. .Electronically signed by Felisa Velazco RN CM on 12/27/2022 at 2:44 PM    The Plan for Transition of Care is related to the following treatment goals: rehab    The Patient and/or patient representative wife  was provided with a choice of provider and agrees   with the discharge plan. [x] Yes [] No    Freedom of choice list was provided with basic dialogue that supports the patient's individualized plan of care/goals, treatment preferences and shares the quality data associated with the providers.  [x] Yes [] No

## 2022-12-27 NOTE — PROGRESS NOTES
Department of Internal Medicine  Nephrology Consult Note    Events reviewed. SUBJECTIVE: We are following Mr. Gualberto Urrutia for RADHA on CKD. Reports no complaints.     PHYSICAL EXAM:      Vitals:    VITALS:  BP (!) 144/67   Pulse 82   Temp 98.1 °F (36.7 °C) (Temporal)   Resp 16   Ht 5' 7\" (1.702 m)   Wt 239 lb (108.4 kg)   SpO2 96%   BMI 37.43 kg/m²   24HR INTAKE/OUTPUT:    Intake/Output Summary (Last 24 hours) at 12/27/2022 4269  Last data filed at 12/27/2022 3756  Gross per 24 hour   Intake 960 ml   Output 4400 ml   Net -3440 ml         Constitutional: Awake, NAD  HEENT: Atraumatic, normocephalic, PERRLA  Respiratory: CTA  Cardiovascular/Edema: RRR, normal S1, normal S2, trace edema  Gastrointestinal: Soft, nondistended, nontender  Neurologic: Focal  Skin:, Dry, no rashes, no lesions, blisters to BOAZ    Scheduled Meds:   bumetanide  2 mg Oral BID    lidocaine PF  5 mL IntraDERmal Once    sodium chloride flush  5-40 mL IntraVENous 2 times per day    heparin flush  3 mL IntraVENous 2 times per day    [Held by provider] allopurinol  100 mg Oral Daily    aspirin  81 mg Oral Daily    atorvastatin  40 mg Oral Daily    vitamin D  2,000 Units Oral Daily    apixaban  5 mg Oral BID    ferrous sulfate  325 mg Oral Daily with breakfast    levothyroxine  88 mcg Oral Daily    [Held by provider] lisinopril  5 mg Oral Daily    [Held by provider] metoprolol succinate  25 mg Oral Daily    [Held by provider] insulin glargine-yfgn  20 Units SubCUTAneous Nightly    cefTRIAXone (ROCEPHIN) IV  1,000 mg IntraVENous Q24H    [Held by provider] gabapentin  600 mg Oral Daily    insulin lispro  0-8 Units SubCUTAneous TID WC    insulin lispro  0-4 Units SubCUTAneous Nightly     Continuous Infusions:   sodium chloride      dextrose       PRN Meds:.sodium chloride flush, sodium chloride, heparin flush, [Held by provider] oxyCODONE-acetaminophen **AND** [Held by provider] oxyCODONE, glucose, dextrose bolus **OR** dextrose bolus, glucagon (rDNA), dextrose, albuterol, [DISCONTINUED] dextrose bolus **OR** dextrose bolus, ondansetron **OR** ondansetron, polyethylene glycol, acetaminophen **OR** acetaminophen      DATA:    CBC with Differential:    Lab Results   Component Value Date/Time    WBC 7.9 12/27/2022 05:30 AM    RBC 3.62 12/27/2022 05:30 AM    HGB 10.5 12/27/2022 05:30 AM    HCT 33.6 12/27/2022 05:30 AM     12/27/2022 05:30 AM    MCV 92.8 12/27/2022 05:30 AM    MCH 29.0 12/27/2022 05:30 AM    MCHC 31.3 12/27/2022 05:30 AM    RDW 16.6 12/27/2022 05:30 AM    SEGSPCT 68 04/27/2011 10:30 AM    LYMPHOPCT 10.9 12/27/2022 05:30 AM    MONOPCT 8.7 12/27/2022 05:30 AM    EOSPCT 7 10/05/2010 10:52 AM    BASOPCT 0.6 12/27/2022 05:30 AM    MONOSABS 0.68 12/27/2022 05:30 AM    LYMPHSABS 0.86 12/27/2022 05:30 AM    EOSABS 0.39 12/27/2022 05:30 AM    BASOSABS 0.05 12/27/2022 05:30 AM     CMP:    Lab Results   Component Value Date/Time     12/27/2022 05:30 AM    K 4.0 12/27/2022 05:30 AM    K 6.5 12/25/2022 05:54 AM     12/27/2022 05:30 AM    CO2 32 12/27/2022 05:30 AM    BUN 77 12/27/2022 05:30 AM    CREATININE 2.0 12/27/2022 05:30 AM    GFRAA >60 10/03/2022 10:55 AM    LABGLOM 35 12/27/2022 05:30 AM    GLUCOSE 193 12/27/2022 05:30 AM    GLUCOSE 133 04/18/2012 08:43 AM    PROT 7.4 12/27/2022 05:30 AM    LABALBU 3.6 12/27/2022 05:30 AM    LABALBU 4.4 10/25/2011 09:50 AM    CALCIUM 9.3 12/27/2022 05:30 AM    BILITOT 0.7 12/27/2022 05:30 AM    ALKPHOS 111 12/27/2022 05:30 AM    AST 26 12/27/2022 05:30 AM    ALT 46 12/27/2022 05:30 AM     Magnesium:    Lab Results   Component Value Date/Time    MG 1.8 12/27/2022 05:30 AM     Phosphorus:    Lab Results   Component Value Date/Time    PHOS 3.8 12/27/2022 05:30 AM     Radiology Review:      Kidney ultrasound December 25, 2022   1. Bilateral renal cortical thinning. There is no hydronephrosis on either   side. 2.  A Prince catheter is decompressing the bladder.              CT abdomen and pelvis 12/24/2022   Suspect mild acute pancreatitis. Mild splenomegaly. Bilateral lower lobe atelectasis. CXR 12/24/2022   No acute process. BRIEF SUMMARY OF INITIAL CONSULT:    Briefly, Delano Archuleta is a 64-year-old male known to us, past medical history CKD stage 3B probably 2/2 nephrosclerosis and previous episode of ischemic ATN requiring temporary HD in May 2017, recurrent episode of ischemic ATN in March 2022 also requiring HD x2 with fair recovery of renal function, baseline creatinine 1.4 to 1.5 mg/dL, HTN, DM type II, A. fib on apixaban, HFpEF 55 to 60%, NIKO, pacemaker placement, hypothyroidism, hyperlipidemia, who presented to the ED on 12/24/2022 via EMS from home for altered mental status and decreased appetite. ED work-up found patient hypoxic, initial lab work revealed potassium level 7.5, creatinine 5.9 with , reasons for this consultation. Hyperkalemia protocol given in ED. Home medications include lisinopril, bumetanide, gabapentin, metformin, empagliflozin, metoprolol. Problems resolved:  Hyperkalemia 2/2 decreased GFR and lisinopril. Resolved. IMPRESSION/RECOMMENDATIONS:        RADHA, stage III likely prerenal RADHA 2/2 intravascular volume depletion from poor oral intake in the setting of ACE inhibition versus cardiorenal syndrome. Kidney ultrasound and CT abdomen and pelvis unrevealing. Creatinine peaked at 5.9 mg/dL, and has improved to 2.0 mg/dL with excellent urine output (4.7 L in 24 hours). Hold evening dose of Bumex today. CKD stage IIIb likely 2/2 nephrosclerosis and previous episodes of ischemic ATN requiring HD x2 separate occasions.   Baseline creatinine 1.4 to 1.5 mg/dL    Hypernatremia 2/2 loop diuretics, hold evening dose of Bumex today and increase free water intake  HTN, metoprolol and lisinopril on hold  HFpEF 55 to 60%, proBNP 6555> 4125, on Bumex   Normal pH with metabolic alkalosis (2/2 bicarb administration)  ------------------------------------------------------------------------------------  Encephalopathy, 2/2 uremia versus medication toxicity (gabapentin), hold gabapentin in view of severely decreased GFR  UTI, on ceftriaxone  Atrial fibrillation, on metoprolol and apixaban  Type II DM, holding metformin  Hypothyroidism on levothyroxine  NIKO  Nutrition: 2L FR    Plan:    Hold evening dose of Bumex today  Increase fluid restriction to 2L, increase free water intake  Continue to monitor renal function  Continue to hold lisinopril  Continue to hold metoprolol  Continue to hold gabapentin  Continue strict I&O      Electronically signed by JACQUELINE Gordon CNP on 12/27/2022 at 8:26 AM

## 2022-12-27 NOTE — PROGRESS NOTES
Received patient to room 8206 A. Oriented to room and environment. Placed on telemetry. Call bell at bedside. Bed alarm on.

## 2022-12-27 NOTE — PLAN OF CARE
Problem: Chronic Conditions and Co-morbidities  Goal: Patient's chronic conditions and co-morbidity symptoms are monitored and maintained or improved  Outcome: Progressing     Problem: Discharge Planning  Goal: Discharge to home or other facility with appropriate resources  Outcome: Progressing     Problem: Safety - Adult  Goal: Free from fall injury  Outcome: Progressing     Problem: Pain  Goal: Verbalizes/displays adequate comfort level or baseline comfort level  Outcome: Progressing     Problem: Skin/Tissue Integrity  Goal: Absence of new skin breakdown  Description: 1. Monitor for areas of redness and/or skin breakdown  2. Assess vascular access sites hourly  3. Every 4-6 hours minimum:  Change oxygen saturation probe site  4. Every 4-6 hours:  If on nasal continuous positive airway pressure, respiratory therapy assess nares and determine need for appliance change or resting period. Outcome: Progressing     Problem: Safety - Medical Restraint  Goal: Remains free of injury from restraints (Restraint for Interference with Medical Device)  Description: INTERVENTIONS:  1. Determine that other, less restrictive measures have been tried or would not be effective before applying the restraint  2. Evaluate the patient's condition at the time of restraint application  3. Inform patient/family regarding the reason for restraint  4.  Q2H: Monitor safety, psychosocial status, comfort, nutrition and hydration  Outcome: Progressing

## 2022-12-27 NOTE — PROGRESS NOTES
Perfect serve sent to inpatient wound nurse to evaluate left arm. S/P IV infiltrate with blistering noted.

## 2022-12-27 NOTE — PROGRESS NOTES
SPEECH/LANGUAGE PATHOLOGY  VIDEOFLUOROSCOPIC STUDY OF SWALLOWING (MBS)   and PLAN OF CARE    PATIENT NAME:  Bernardo Vick  (male)     MRN:  [id number]    :  1955  (69 y.o.)  STATUS:  Inpatient: Room 8206/8206-A    TODAY'S DATE:  2022  REFERRING PROVIDER:   Dr. Moreno Heads: FL modified barium swallow with video  Date of order:  22   REASON FOR REFERRAL: dysphaiga   EVALUATING THERAPIST: Dariana Berman, SLP      RESULTS:      DYSPHAGIA DIAGNOSIS:  mild  oropharyngeal phase dysphagia     DIET RECOMMENDATIONS:  Easy to chew consistency solids (IDDSI level 7, transitional) with  thin liquids (IDDSI level 0)    FEEDING RECOMMENDATIONS:    Assistance level:  No assistance needed     Compensatory strategies recommended: No straw     Discussed recommendations with nursing and/or faxed report to referring provider: Nursing not available, diet change recommendation placed in Physician sticky note section     SPEECH THERAPY  PLAN OF CARE   The dysphagia POC is established based on physician order and dysphagia diagnosis    Dysphagia therapy is not recommended       Conditions Requiring Skilled Therapeutic Intervention for dysphagia:    Not applicable    SPECIFIC DYSPHAGIA INTERVENTIONS TO INCLUDE:     not applicable    Specific instructions for next treatment:  not applicable   Treatment Goals:    Short Term Goals:  Not applicable no therapy warranted     Long Term Goals:   Not applicable no therapy warranted      Patient/family Goal:    not applicable    Plan of care discussed with Patient                     ADMITTING DIAGNOSIS: Dehydration [E86.0]  Hyperkalemia [E87.5]  RADHA (acute kidney injury) ([facility] [address] [N17.9]  Altered mental status, unspecified altered mental status type [R41.82]  Class 2 severe obesity with serious comorbidity in adult, unspecified BMI, unspecified obesity type ([facility] [address] [E66.01]     VISIT DIAGNOSIS:   Visit Diagnoses         Codes    Dehydration     E86.0    Altered mental status, unspecified altered mental status type     R41.82    RAHDA (acute kidney injury) ([facility] [address]     N17.9    Class 2 severe obesity with serious comorbidity in adult, unspecified BMI, unspecified obesity type ([facility] [address]     E66.01                PATIENT REPORT/COMPLAINT: patient currently on modified diet. PRIOR LEVEL OF SWALLOW FUNCTION:    Past History of Dysphagia?:  yes    Home diet: Regular consistency solids (IDDSI level 7) with  thin liquids (IDDSI level 0)  Current Diet Order:  ADULT DIET; Dysphagia - Pureed; 4 carb choices (60 gm/meal); Low Sodium (2 gm); Low Potassium (Less than 3000 mg/day); Mildly Thick (Nectar); 1200 ml    PROCEDURE:  Consistencies Administered During the Evaluation   Liquids: thin liquid and nectar thick liquid   Solids:  pureed foods and solid foods      Method of Intake:   cup, straw, spoon  Self fed, Fed by clinician      Position:   Seated, upright, Lateral plane    INSTRUMENTAL ASSESSMENT:    ORAL PREP/ ORAL PHASE:    Decreased mastication due to:  poor/missing dentition       PHARYNGEAL PHASE:     ONSET TIME       Onset time of the pharyngeal swallow was adequate       PHARYNGEAL RESIDUALS        Vallecula/Pharyngeal Wall           No significant residuals were noted in the vallecula      Pyriform Sinuses      No significant residuals were noted in the pyriform sinuses     LARYNGEAL PENETRATION   Laryngeal penetration occurred in the absence of aspiration DURING the swallow for thin liquid due to  delayed laryngeal closure which remained in the laryngeal vestibule.  . Laryngeal penetration was minimal and occurred only with use of a straw   a delayed cough was noted    ASPIRATION  Aspiration was not present during this evaluation    PENETRATION-ASPIRATION SCALE (PAS):  THIN 3 = Material enters the airway, remains above the vocal folds, and is not ejected from the airway  MILDLY THICK 1 = Material does not enter the airway  MODERATELY THICK item not administered  PUREE 1 = Material does not enter the airway  HARD SOLID 1 = Material does not enter the airway       COMPENSATORY STRATEGIES    Compensatory strategies that were beneficial included No straw      STRUCTURAL/FUNCTIONAL ANOMALIES   No structural/functional anomalies were noted    CERVICAL ESOPHAGEAL STAGE :     The cervical esophagus appeared adequate          ___________    Cognition:   Confusion noted    Oral Peripheral Examination   Generalized oral weakness    Current Respiratory Status   4 liters O2 via nasal cannula     Parameters of Speech Production  Respiration:  Adequate for speech production  Quality:   Strained  Intensity: Within functional limits    Pain: No pain reported. EDUCATION:   The Speech Language Pathologist (SLP) completed education regarding results of evaluation and that intervention is not warranted at this time. Learner: Patient  Education: Reviewed results and recommendations of this evaluation and Reviewed diet and strategies  Evaluation of Education:  Eloy Magallanes understanding    This plan may be re-evaluated and revised as warranted. Evaluation Time includes thorough review of current medical information, gathering information on past medical history/social history and prior level of function, completion of standardized testing/informal observation of tasks, assessment of data and education on plan of care and goals. [x]The admitting diagnosis and active problem list, have been reviewed prior to initiation of this evaluation.     CPT Code: 75347  dysphagia study    ACTIVE PROBLEM LIST:   Patient Active Problem List   Diagnosis    DM (diabetes mellitus) ([facility] [address]    Class 2 obesity due to excess calories with serious comorbidity and body mass index (BMI) of 38.0 to 38.9 in adult    Hyperlipidemia    Essential hypertension    Hypothyroidism    Lung nodules    Arthritis of shoulder region, left    OA (osteoarthritis of the spine)    S/P laminectomy with spinal fusion    Colorectal polyps    Diverticula of colon    NIKO (obstructive sleep apnea)    Hypoxemia    Restrictive lung disease secondary to obesity    Atypical atrial flutter (HCC)    Sinus node dysfunction (HCC)    Pacemaker    Persistent atrial fibrillation (HCC)    Diabetes mellitus type 2 in obese (HCC)    Chronic obstructive pulmonary disease, unspecified COPD type ([facility] [address]    Acute heart failure with preserved ejection fraction (HFpEF) ([facility] [address]    Chronic hypercapnic respiratory failure ([facility] [address]    Encounter regarding vascular access for dialysis for ESRD ([facility] [address]    At risk for colon cancer    Acute on chronic congestive heart failure (HCC)    Acute on chronic diastolic heart failure (HCC)    Normocytic anemia    Stage 3b chronic kidney disease ([facility] [address]    Acute on chronic heart failure with preserved ejection fraction (HCC)    Chronic kidney disease, stage 2 (mild)    Hypercalcemia    Proteinuria due to type 2 diabetes mellitus ([facility] [address]    Skin lesion of face    Hyperkalemia

## 2022-12-28 LAB
ALBUMIN SERPL-MCNC: 3.6 G/DL (ref 3.5–5.2)
ALP BLD-CCNC: 106 U/L (ref 40–129)
ALT SERPL-CCNC: 39 U/L (ref 0–40)
ANION GAP SERPL CALCULATED.3IONS-SCNC: 13 MMOL/L (ref 7–16)
AST SERPL-CCNC: 24 U/L (ref 0–39)
BASOPHILS ABSOLUTE: 0.06 E9/L (ref 0–0.2)
BASOPHILS RELATIVE PERCENT: 0.7 % (ref 0–2)
BILIRUB SERPL-MCNC: 0.8 MG/DL (ref 0–1.2)
BUN BLDV-MCNC: 61 MG/DL (ref 6–23)
CALCIUM IONIZED: 1.24 MMOL/L (ref 1.15–1.33)
CALCIUM SERPL-MCNC: 9.5 MG/DL (ref 8.6–10.2)
CHLORIDE BLD-SCNC: 97 MMOL/L (ref 98–107)
CO2: 35 MMOL/L (ref 22–29)
CREAT SERPL-MCNC: 1.7 MG/DL (ref 0.7–1.2)
EOSINOPHILS ABSOLUTE: 0.46 E9/L (ref 0.05–0.5)
EOSINOPHILS RELATIVE PERCENT: 5.7 % (ref 0–6)
GFR SERPL CREATININE-BSD FRML MDRD: 43 ML/MIN/1.73
GLUCOSE BLD-MCNC: 184 MG/DL (ref 74–99)
HCT VFR BLD CALC: 36.1 % (ref 37–54)
HEMOGLOBIN: 11.3 G/DL (ref 12.5–16.5)
IMMATURE GRANULOCYTES #: 0.11 E9/L
IMMATURE GRANULOCYTES %: 1.4 % (ref 0–5)
LYMPHOCYTES ABSOLUTE: 1.23 E9/L (ref 1.5–4)
LYMPHOCYTES RELATIVE PERCENT: 15.2 % (ref 20–42)
MAGNESIUM: 1.7 MG/DL (ref 1.6–2.6)
MCH RBC QN AUTO: 29.5 PG (ref 26–35)
MCHC RBC AUTO-ENTMCNC: 31.3 % (ref 32–34.5)
MCV RBC AUTO: 94.3 FL (ref 80–99.9)
METER GLUCOSE: 171 MG/DL (ref 74–99)
METER GLUCOSE: 190 MG/DL (ref 74–99)
METER GLUCOSE: 192 MG/DL (ref 74–99)
METER GLUCOSE: 242 MG/DL (ref 74–99)
MONOCYTES ABSOLUTE: 0.82 E9/L (ref 0.1–0.95)
MONOCYTES RELATIVE PERCENT: 10.1 % (ref 2–12)
NEUTROPHILS ABSOLUTE: 5.4 E9/L (ref 1.8–7.3)
NEUTROPHILS RELATIVE PERCENT: 66.9 % (ref 43–80)
PDW BLD-RTO: 16 FL (ref 11.5–15)
PHOSPHORUS: 3.5 MG/DL (ref 2.5–4.5)
PLATELET # BLD: 259 E9/L (ref 130–450)
PMV BLD AUTO: 9.5 FL (ref 7–12)
POTASSIUM SERPL-SCNC: 3.5 MMOL/L (ref 3.5–5)
RBC # BLD: 3.83 E12/L (ref 3.8–5.8)
SODIUM BLD-SCNC: 145 MMOL/L (ref 132–146)
TOTAL PROTEIN: 7.4 G/DL (ref 6.4–8.3)
URINE CULTURE, ROUTINE: NORMAL
WBC # BLD: 8.1 E9/L (ref 4.5–11.5)

## 2022-12-28 PROCEDURE — 97535 SELF CARE MNGMENT TRAINING: CPT

## 2022-12-28 PROCEDURE — 6370000000 HC RX 637 (ALT 250 FOR IP): Performed by: FAMILY MEDICINE

## 2022-12-28 PROCEDURE — 6360000002 HC RX W HCPCS: Performed by: NURSE PRACTITIONER

## 2022-12-28 PROCEDURE — 6370000000 HC RX 637 (ALT 250 FOR IP): Performed by: LICENSED PRACTICAL NURSE

## 2022-12-28 PROCEDURE — 97530 THERAPEUTIC ACTIVITIES: CPT

## 2022-12-28 PROCEDURE — 36415 COLL VENOUS BLD VENIPUNCTURE: CPT

## 2022-12-28 PROCEDURE — 51702 INSERT TEMP BLADDER CATH: CPT

## 2022-12-28 PROCEDURE — 2580000003 HC RX 258: Performed by: NURSE PRACTITIONER

## 2022-12-28 PROCEDURE — 6370000000 HC RX 637 (ALT 250 FOR IP): Performed by: STUDENT IN AN ORGANIZED HEALTH CARE EDUCATION/TRAINING PROGRAM

## 2022-12-28 PROCEDURE — 84100 ASSAY OF PHOSPHORUS: CPT

## 2022-12-28 PROCEDURE — 85025 COMPLETE CBC W/AUTO DIFF WBC: CPT

## 2022-12-28 PROCEDURE — S5553 INSULIN LONG ACTING 5 U: HCPCS | Performed by: STUDENT IN AN ORGANIZED HEALTH CARE EDUCATION/TRAINING PROGRAM

## 2022-12-28 PROCEDURE — 1200000000 HC SEMI PRIVATE

## 2022-12-28 PROCEDURE — 83735 ASSAY OF MAGNESIUM: CPT

## 2022-12-28 PROCEDURE — 97161 PT EVAL LOW COMPLEX 20 MIN: CPT

## 2022-12-28 PROCEDURE — 99231 SBSQ HOSP IP/OBS SF/LOW 25: CPT | Performed by: INTERNAL MEDICINE

## 2022-12-28 PROCEDURE — 80053 COMPREHEN METABOLIC PANEL: CPT

## 2022-12-28 PROCEDURE — 94660 CPAP INITIATION&MGMT: CPT

## 2022-12-28 PROCEDURE — 97165 OT EVAL LOW COMPLEX 30 MIN: CPT

## 2022-12-28 PROCEDURE — 82330 ASSAY OF CALCIUM: CPT

## 2022-12-28 PROCEDURE — 2700000000 HC OXYGEN THERAPY PER DAY

## 2022-12-28 PROCEDURE — 82962 GLUCOSE BLOOD TEST: CPT

## 2022-12-28 RX ORDER — ACETAZOLAMIDE 250 MG/1
500 TABLET ORAL DAILY
Status: COMPLETED | OUTPATIENT
Start: 2022-12-28 | End: 2022-12-28

## 2022-12-28 RX ORDER — POTASSIUM CHLORIDE 20 MEQ/1
20 TABLET, EXTENDED RELEASE ORAL 2 TIMES DAILY WITH MEALS
Status: COMPLETED | OUTPATIENT
Start: 2022-12-28 | End: 2022-12-28

## 2022-12-28 RX ORDER — ACETAZOLAMIDE 250 MG/1
500 TABLET ORAL DAILY
Status: DISCONTINUED | OUTPATIENT
Start: 2022-12-28 | End: 2022-12-28

## 2022-12-28 RX ADMIN — APIXABAN 5 MG: 5 TABLET, FILM COATED ORAL at 08:32

## 2022-12-28 RX ADMIN — ASPIRIN 81 MG: 81 TABLET, COATED ORAL at 08:32

## 2022-12-28 RX ADMIN — ATORVASTATIN CALCIUM 40 MG: 40 TABLET, FILM COATED ORAL at 08:32

## 2022-12-28 RX ADMIN — HEPARIN 300 UNITS: 100 SYRINGE at 08:36

## 2022-12-28 RX ADMIN — POTASSIUM CHLORIDE 20 MEQ: 1500 TABLET, EXTENDED RELEASE ORAL at 17:32

## 2022-12-28 RX ADMIN — INSULIN LISPRO 2 UNITS: 100 INJECTION, SOLUTION INTRAVENOUS; SUBCUTANEOUS at 11:42

## 2022-12-28 RX ADMIN — ACETAZOLAMIDE 500 MG: 250 TABLET ORAL at 10:46

## 2022-12-28 RX ADMIN — FERROUS SULFATE TAB 325 MG (65 MG ELEMENTAL FE) 325 MG: 325 (65 FE) TAB at 08:32

## 2022-12-28 RX ADMIN — POTASSIUM CHLORIDE 20 MEQ: 1500 TABLET, EXTENDED RELEASE ORAL at 10:46

## 2022-12-28 RX ADMIN — HEPARIN 300 UNITS: 100 SYRINGE at 03:58

## 2022-12-28 RX ADMIN — SODIUM CHLORIDE, PRESERVATIVE FREE 10 ML: 5 INJECTION INTRAVENOUS at 20:50

## 2022-12-28 RX ADMIN — LEVOTHYROXINE SODIUM 88 MCG: 0.09 TABLET ORAL at 06:53

## 2022-12-28 RX ADMIN — SODIUM CHLORIDE, PRESERVATIVE FREE 10 ML: 5 INJECTION INTRAVENOUS at 08:36

## 2022-12-28 RX ADMIN — INSULIN GLARGINE-YFGN 5 UNITS: 100 INJECTION, SOLUTION SUBCUTANEOUS at 20:49

## 2022-12-28 RX ADMIN — Medication 2000 UNITS: at 08:32

## 2022-12-28 RX ADMIN — ACETAMINOPHEN 650 MG: 325 TABLET ORAL at 03:57

## 2022-12-28 RX ADMIN — APIXABAN 5 MG: 5 TABLET, FILM COATED ORAL at 20:49

## 2022-12-28 RX ADMIN — ACETAMINOPHEN 650 MG: 325 TABLET ORAL at 20:49

## 2022-12-28 ASSESSMENT — PAIN DESCRIPTION - ORIENTATION
ORIENTATION: LOWER
ORIENTATION: LOWER

## 2022-12-28 ASSESSMENT — PAIN DESCRIPTION - DESCRIPTORS
DESCRIPTORS: ACHING;DISCOMFORT
DESCRIPTORS: ACHING;DISCOMFORT;CRUSHING

## 2022-12-28 ASSESSMENT — PAIN DESCRIPTION - LOCATION
LOCATION: BACK
LOCATION: BACK

## 2022-12-28 ASSESSMENT — PAIN SCALES - GENERAL
PAINLEVEL_OUTOF10: 0
PAINLEVEL_OUTOF10: 4

## 2022-12-28 NOTE — PROGRESS NOTES
Physical Therapy  Physical Therapy Initial Assessment     Name: Tawana Valdes  : 1955  MRN: [id number]      Date of Service: 2022    Evaluating PT:  Rachel Mathews PT, DPT    Room #:  9343/4799-M  Diagnosis:  Dehydration [E86.0]  Hyperkalemia [E87.5]  RADHA (acute kidney injury) ([facility] [address] [N17.9]  Altered mental status, unspecified altered mental status type [R41.82]  Class 2 severe obesity with serious comorbidity in adult, unspecified BMI, unspecified obesity type ([facility] [address] [E66.01]  PMHx/PSHx:  CHF, CKD, DM, HLD, HTN, OA, NIKO  Procedure/Surgery:  N/A  Precautions:  fall risk, cognition  Equipment Needs:  TBD    SUBJECTIVE:    Pt lives with spouse in a 2 story home with 3 steps to enter and B handrail. Bed/bath is on 2nd floor. Pt ambulated with cane PRN PTA. OBJECTIVE:   Initial Evaluation  Date: 22 Treatment Short Term/ Long Term   Goals   AM-PAC 6 Clicks 92/49     Was pt agreeable to Eval/treatment? yes     Does pt have pain?  No pain     Bed Mobility  Rolling: NT  Supine to sit: NT  Sit to supine: NT  Scooting: SBA  Rolling: IND  Supine to sit: IND  Sit to supine: IND  Scooting: IND   Transfers Sit to stand: CGA  Stand to sit: CGA  Stand pivot: CGA with ww  Sit to stand: mod I  Stand to sit: mod I  Stand pivot: mod I with AAD   Ambulation    75'x2 with ww min A  200'+ with AAD mod I   Stair negotiation: ascended and descended  NT  12 steps with 1 handrail mod I     Strength/ROM:   BLE grossly 4/5  BLE AROM WFL    Balance:   Static Sitting: Supervision  Dynamic Sitting: SBA  Static Standing: SBA with ww  Dynamic Standing: CGA with ww    Pt is A & O x 3  Sensation:  Pt denies numbness and tingling to extremities  Edema:  unremarkable    Vitals:  SpO2 WNL, while on RA during session, pt weaned from 4L    Therapeutic Exercises:    Bed mobility: supine<>sit, cued for EOB positioning  Transfers: STSx2, cued for hand placement and postural correction  Ambulation: 75'x2 with ww  BLE AROM    Patient education  Pt educated on role of PT, importance of functional mobility during hospital stay, safety with functional mobility    Patient response to education:   Pt verbalized understanding Pt demonstrated skill Pt requires further education in this area   yes yes reinforce     ASSESSMENT:    Conditions Requiring Skilled Therapeutic Intervention:    [x]Decreased strength     []Decreased ROM  [x]Decreased functional mobility  [x]Decreased balance   [x]Decreased endurance   [x]Decreased posture  []Decreased sensation  []Decreased coordination   []Decreased vision  [x]Decreased safety awareness   []Increased pain       Comments:    Pt sitting at EOB upon entering, agreeable to participate. Pt instructed to transfer to EOB, completing transfer with hands on assistance. Pt provided ww to steady himself. Pt demonstrating good static standing balance. Pt instructed to ambulate to tolerance. Pt ambulating with fair xuan and flexed trunk. Pt cued for postural correction and ww spacing. Pt tolerable to fair distance and was assisted back to bedside at the end of session. Pt was assisted to bedside chair, cued for positioning and hand placement. Pt remained in bedside chair with all needs met and call bell in reach prior to exiting. Pt would benefit from 24/7 supervision if possible, due to pt's recent confusion     Treatment:  Patient practiced and was instructed in the following treatment:    STS and pivot transfer training - pt educated on proper hand and foot placement, safety and sequencing, and use of verbal and tactile cues to safely complete sit<>stand and pivot transfers with hands on assistance to complete task safely   Gait training- pt was given verbal and tactile cues to facilitate pt safety with ww use during ambulation as well as provided with physical assistance. Pt's/ family goals   1. Return home    Prognosis is good for reaching above PT goals.     Patient and or family understand(s) diagnosis, prognosis, and plan of care. yes    PHYSICAL THERAPY PLAN OF CARE:    PT POC is established based on physician order and patient diagnosis     Referring provider/PT Order:  Noe Ahumada MD  Diagnosis:  Dehydration [E86.0]  Hyperkalemia [E87.5]  RADHA (acute kidney injury) ([facility] [address] [N17.9]  Altered mental status, unspecified altered mental status type [R41.82]  Class 2 severe obesity with serious comorbidity in adult, unspecified BMI, unspecified obesity type ([facility] [E66.01]  Specific instructions for next treatment:  Progress as tolerated    Current Treatment Recommendations:     [x] Strengthening to improve independence with functional mobility   [] ROM to improve independence with functional mobility   [x] Balance Training to improve static/dynamic balance and to reduce fall risk  [x] Endurance Training to improve activity tolerance during functional mobility   [x] Transfer Training to improve safety and independence with all functional transfers   [x] Gait Training to improve gait mechanics, endurance and assess need for appropriate assistive device  [x] Stair Training in preparation for safe discharge home and/or into the community   [] Positioning to prevent skin breakdown and contractures  [x] Safety and Education Training   [x] Patient/Caregiver Education   [] HEP  [] Other     PT long term treatment goals are located in above grid    Frequency of treatments: 2-5x/week x 1-2 weeks. Time in  1315  Time out  1343    Total Treatment Time  10 minutes     Evaluation Time includes thorough review of current medical information, gathering information on past medical history/social history and prior level of function, completion of standardized testing/informal observation of tasks, assessment of data and education on plan of care and goals.     CPT codes:  [x] Low Complexity PT evaluation 04175  [] Moderate Complexity PT evaluation 49266  [] High Complexity PT evaluation 54199  [] PT Re-evaluation 85654  [] Gait training 22145 -- minutes  [] Manual therapy [address] [facility] -- minutes  [x] Therapeutic activities 33520 10 minutes  [] Therapeutic exercises 73531 -- minutes  [] Neuromuscular reeducation 27847 -- minutes     Javier Mera PT, DPT  [id number]

## 2022-12-28 NOTE — PROGRESS NOTES
[facility]  Internal Medicine Residency Program  Progress Note - House Team 1    Patient:  Wander Rocha 67 y.o. male MRN: [id number]     Date of Service: 12/28/2022     CC: AMS      Subjective   Overnight events: No acute events overnight. Patient had a MBSS completed yesterday that showed no evidence of aspiration and mild oropharyngeal phase dysphagia. Xray of the neck also showed no hematoma or soft tissue swelling.      Patient seen and examined at bedside this morning in no acute distress. He said he is doing well. He denies any pain at this time. Patient did  mention that his left arm feels itchy around the bulla but no pain.     Denies any shortness of breath, chest pain, trouble swallowing, fever, chills, constipation, diarrhea or leg swelling at this time.     Objective     Physical Exam:  Vitals: /66   Pulse 89   Temp 97.7 °F (36.5 °C) (Temporal)   Resp 16   Ht 5' 7\" (1.702 m)   Wt 239 lb (108.4 kg)   SpO2 97%   BMI 37.43 kg/m²     I & O - 24hr: No intake/output data recorded.   General Appearance: alert and cooperative  HEENT:  Head: Normal, normocephalic, atraumatic.  Neck: no adenopathy, no JVD, supple, symmetrical, trachea midline, and thyroid not enlarged, symmetric, no tenderness/mass/nodules ;   Lung: clear to auscultation bilaterally  Heart: regular rate and rhythm, S1, S2 normal, no murmur, click, rub or gallop  Abdomen: soft, non-tender; bowel sounds normal; no masses,  no organomegaly  Extremities:   left upper extremity  has large fluid filled boula along medial aspect that is fluid filled and minor bulla around the antecubital fossa with surrounding erythema; right upper extremity has ecchymosis that is non-tender  Musculokeletal: No joint swelling, no muscle tenderness. ROM normal in all joints of extremities.   Neurologic: Mental status: Alert, oriented, thought content appropriate  Subject  Pertinent Labs & Imaging Studies   carmela  CBC with Differential:   Lab Results   Component Value Date/Time    WBC 8.1 12/28/2022 04:31 AM    RBC 3.83 12/28/2022 04:31 AM    HGB 11.3 12/28/2022 04:31 AM    HCT 36.1 12/28/2022 04:31 AM     12/28/2022 04:31 AM    MCV 94.3 12/28/2022 04:31 AM    MCH 29.5 12/28/2022 04:31 AM    MCHC 31.3 12/28/2022 04:31 AM    RDW 16.0 12/28/2022 04:31 AM    SEGSPCT 68 04/27/2011 10:30 AM    LYMPHOPCT 15.2 12/28/2022 04:31 AM    MONOPCT 10.1 12/28/2022 04:31 AM    EOSPCT 7 10/05/2010 10:52 AM    BASOPCT 0.7 12/28/2022 04:31 AM    MONOSABS 0.82 12/28/2022 04:31 AM    LYMPHSABS 1.23 12/28/2022 04:31 AM    EOSABS 0.46 12/28/2022 04:31 AM    BASOSABS 0.06 12/28/2022 04:31 AM     BMP:    Lab Results   Component Value Date/Time     12/28/2022 04:31 AM    K 3.5 12/28/2022 04:31 AM    K 6.5 12/25/2022 05:54 AM    CL 97 12/28/2022 04:31 AM    CO2 35 12/28/2022 04:31 AM    BUN 61 12/28/2022 04:31 AM    LABALBU 3.6 12/28/2022 04:31 AM    LABALBU 4.4 10/25/2011 09:50 AM    CREATININE 1.7 12/28/2022 04:31 AM    CALCIUM 9.5 12/28/2022 04:31 AM    GFRAA >60 10/03/2022 10:55 AM    LABGLOM 43 12/28/2022 04:31 AM    GLUCOSE 184 12/28/2022 04:31 AM    GLUCOSE 133 04/18/2012 08:43 AM     Magnesium:    Lab Results   Component Value Date/Time    MG 1.7 12/28/2022 04:31 AM     Phosphorus:    Lab Results   Component Value Date/Time    PHOS 3.5 12/28/2022 04:31 AM       Resident's Assessment and Plan   Mr. Herve Fatima, 69year old male, with PMH of atrial fibrillation on eliquis, hypothyroidism, NIKO, sinus node dysfunction, CKD,  type 2 diabetes, nocturnal oxygen dependent on 2.5 L presented to ED due to AMS. Patient was brought in by his wife. He was found to have elevated potassium on presentation at 7.5. Creatinine was elevated at 5.9 (baseline creatinine around 1.3-1.5). Hyperkalemia treatment was initiated in the ED and nephrology was consulted. Patient was started on normal saline at 125 cc/h and was admitted to the general medical floors.   Patient was only oriented to himself. On 12/25 patient was started on Bumex drip and transferred to the MICU for further management with concerns for possible dialysis if no improvement in BMP. Patient had poor vascular access and general surgery was consulted on 12/25 for central line insertion and right subclavian. Patient was pancultured finding for source of altered mental status. UA showed small leukocyte esterase and few bacteria was started on ceftriaxone on 12/25. Over hospital course cultures have all come back negative. Ceftriaxone was discontinued on 12/28. Renal function started to improve and Bumex drip was discontinued and patient was continued on oral Bumex. Patient continued to be altered and only oriented to person and place. Wife was agreeable to set up referral to patient could have skilled facility at discharge. 1.Acute encephalopathy 2/2 multifactorial, uremia vs hyperglycemia    -CT of the head negative for acute abnormalities   -Home gabapentin on hold   - on admission, currently downtrending   -Monitor mentation    2. Acute Hypoxic Respiratory Failure 2/2 CHF exacerbation    -proBNP elevated on admission, clinically hypervolemic   -On 4 L nasal cannula   -Monitor respiratory status and wean O2 as tolerated    3. HFpEF     -EF 65% with stage II diastolic dysfunction    -on GDMT - recently started on Jardiance, Entresto and Toprolol XL    -Bumex drip discontinued 12/26   -P.o. Bumex 2 mg twice daily   -Cardiology consulted-follow recommendations   -Follows with CHF clinic as outpatient    4. Atrial fibrillations with pacemaker    - On anticoagulation with Eliquis at home; continue    5. Hx Hypertension    -Stable, on metoprolol at home   -Hold home medication with low BP   -Monitor vital signs    6. Hyperlipidemia    -On statin at home; continue     7. Stage III RADHA on CKD 2/2 possible cardiorenal syndrome    -baseline Cr 1.4-1.5    -currently downtrending Cr to 1.7 today    -Nephrology consulted-follow recommendations    8. CKD stage IIIb likely 2/2 neprhosclerosis and previous episodes of ischemic ATN requiring Hdx2    -Baseline Cr 1.4 -1.58.   -Nephrology consulted-follow recommendations    9. Uremia 2/2 hypertension    -BUN down trending; on admission 103   -Today BUN 77     10. UTI    -UA showed leukocyte esterase and bacteria    -started on Ceftriaxone on 12/25 - discontinued on 12/28; all cultures negative    11. Type 2 DM   -On metformin and lantus at home; currently hold metformin while inpatient   -On medium dose sliding scale  -started on 5 units Lantus nightly on 12/28  -Monitor blood glucose AC at bedtime    12. Hx Hypothyroidism   - TSH 9.3, fT4 1.08; continue home dose synthroid 88 mcg        PT/OT evaluation: ordered  DVT prophylaxis/ GI prophylaxis: Eliquis/ diet   Disposition: continue current care     Carol Daly MD, PGY-1  Attending physician: Dr. Amira Galaviz

## 2022-12-28 NOTE — PROGRESS NOTES
[address]        APBL:                                                  Patient Name: George Mishra    MRN: [id number]    : 1955    Room: [address] [place] [address]      Evaluating OT: Demarcus Valerio, [address] OTR/L; [id number]      Referring Provider: Nichole Sherwood MD    Specific Provider Orders/Date: OT Eval and Treat 22      Diagnosis: Hyperkalemia     Surgery: none this admission    Pertinent Medical History:  has a past medical history of RADHA (acute kidney injury) ([facility] [address] Atrial fibrillation ([address] Carpal tunnel syndrome, Encounter regarding vascular access for dialysis for ESRD ([facility] [address] Hyperlipidemia, Hypertension, Hypothyroidism, Lung nodule, Nocturnal hypoxemia due to obesity, Obesity, NIKO (obstructive sleep apnea), Osteoarthritis, Pinched nerve, Restrictive lung disease secondary to obesity, Sinus node dysfunction ([facility] [address] and Type II or unspecified type diabetes mellitus without mention of complication, not stated as uncontrolled.       Reason for Admission: confusion, decreased appetite, incontinent of stool and urine    Recommended Adaptive Equipment:  TBD pending progression/discharge plan     Precautions:  Fall Risk, Cognition, TSM, Prince Catheter, Cognition     Assessment of current deficits:    [x] Functional mobility  [x]ADLs  [x] Strength               [x]Cognition    [x] Functional transfers   [x] IADLs         [x] Safety Awareness   [x]Endurance    [x] Fine Coordination              [x] Balance      [] Vision/perception   []Sensation     []Gross Motor Coordination  [] ROM  [] Delirium                   [] Motor Control     OT PLAN OF CARE   OT POC based on physician orders, patient diagnosis and results of clinical assessment    Frequency/Duration: 1-3 days/wk for 2 weeks PRN   Specific OT Treatment Interventions to include:   * Instruction/training on adapted ADL techniques and AE recommendations to increase functional independence within precautions       * Training on energy conservation strategies, correct breathing pattern and techniques to improve independence/tolerance for self-care routine  * Functional transfer/mobility training/DME recommendations for increased independence, safety, and fall prevention  * Patient/Family education to increase follow through with safety techniques and functional independence  * Recommendation of environmental modifications for increased safety with functional transfers/mobility and ADLs  * Cognitive retraining/development of therapeutic activities to improve problem solving, judgement, memory, and attention for increased safety/participation in ADL/IADL tasks  * Therapeutic exercise to improve motor endurance, ROM, and functional strength for ADLs/functional transfers  * Therapeutic activities to facilitate/challenge dynamic balance, stand tolerance for increased safety and independence with ADLs    Home Living:  Pt lives with his wife in a 2 story home with 3 steps and BHR to enter. Bed and bath on 2nd floor. Half bath on 1st floor. Full flight of steps and 1HR to access.    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     Pain Level: 0/10 pain at rest, pain in R knee with functional mobility reported as \"stiffness\"  Cognition: A&O 2-3/4 - oriented to self, location, stated month as \"12th month\" correct to December, unable to state year corrected with choices       Follows multi step commands - fair affect              Memory:  FAIR -              Sequencing: FAIR -              Problem solving:  FAIR -              Judgement/safety:  FAIR -       Vitals/Lab Values:  WFL room air                   Functional Assessment:  AM-PAC Daily Activity Raw Score: 14/24    Initial Eval Status  Date: 12/28/22    Treatment Status  Date: STGs = LTGs  Time frame: 10-14 days   Feeding SBA  Tray set up seated in bedside chair         Mod Ind   Grooming SBA   washing hands sink side     Mod Ind   UB Dressing Min A  Doff/enzo gown seated in bathroom     Mod Ind      LB Dressing Dep  Enzo socks bed level     Mod Ind      Bathing Mod A   Limited functional reach for LB bathing     Mod Ind      Toileting Min A  Assist with posterior pericare        Mod Ind      Bed Mobility  Supine to sit: SBA  Sit to supine: SBA      Supine to sit: Mod Ind  Sit to supine: Mod Ind      Functional Transfers Sit to Stand: CGA   Stand to Sit: CGA  Stand Pivot: CGA  Commode: CGA     Mod Ind      Functional Mobility CGA with ww  Within room, hallway, and bathroom        Mod Ind with AD      Balance Sitting:     Static:  SBA    Dynamic:  SBA  Standing: CGA     Sitting:     Static:  Sup    Dynamic:  Sup  Standing:     Static:  Mod Ind   Activity Tolerance Fair(+)        Good   Visual/  Perceptual     Hearing:  WNL   Glasses: No     WFL   Hearing Aids:  No           Vitals HR:  BPM     SpO2 on 4L NC: 99%  SpO2 on 2L NC during functional mobility: 97%  SpO2 on RA during functional mobility: 91-96%          Hand Dominance: Right    AROM Strength Additional Info:    RUE  WFL Grossly tested Good ;   Good FMC/dexterity noted during ADL tasks      LUE WFL Grossly tested Good ;    Good FMC/dexterity noted during ADL tasks         Sensation:  Denies numbness or tingling Eliu UEs   Tone: WFL Eliu UEs   Edema: None Noted Eliu UEs;  Moderate edema Distal Eliu LEs       Patient/Family Goal: pt family goal is discharge to [facility]    Comment: Cleared by RN to see pt. Upon arrival patient seated EOB with RNand agreeable to OT session. At end of session, patient seated in bedside chair, RN aware, with call light and phone within reach, all lines and tubes intact. Overall patient demonstrated  decreased independence and safety during completion of ADL/functional transfer/mobility tasks. Pt would benefit from continued skilled OT to increase safety and independence with completion of ADL/IADL tasks for functional independence and quality of life. Treatment: Pt required vc's for proper technique/safety with hand placement/body mechanics/posture for bed mobility/ADLs/functional tranfers/mobility/ww management. Pt required vc's for sequencing/initiation of ADLs/functional transfers. Pt able to  sit EOB ~ mins and at sink ~3 mins to increase core strength/balance/activity tolerance for ease with ADLs. Pt required increased time to complete ADLs/functional transfers due to management of multiple lines. Pt required skilled monitoring of SpO2 during session and education on energy conservation techniques. Pt required rest breaks during session and educated on pursed lip breathing. Pt appeared to have tolerated session well and appears motivated/cooperative/pleasant . Pt instructed on use of call light for assistance and fall prevention. Pt demo'ing fair understanding of education provided. Continue to educate. Rehab Potential: Good  for established goals       LTG: maximize independence with ADLs to return to PLOF    Patient and/or family were instructed on functional diagnosis, prognosis/goals and OT plan of care. Demonstrated FAIR understanding. [] Malnutrition indicators have been identified and nursing has been notified to ensure a dietitian consult is ordered.         Eval Complexity: Low    Evaluation time includes thorough review of current medical information, gathering information on past medical & social history & PLOF, completion of standardized testing, informal observation of tasks, consultation with other medical professions/disciplines, assessment of data & development of POC/goals. Time In: 1310  Time Out: 1340  Total Treatment Time: 15    Min Units   OT Eval Low 85291  x     OT Eval Medium 00336      OT Eval High 64755      OT Re-Eval [id number]       Therapeutic Ex 62577       Therapeutic Activities 14038       ADL/Self Care 96768  15  1   Orthotic Management 87462       Manual [id number]     Neuro Re-Ed 98541       Non-Billable Time          Evaluation Time additionally includes thorough review of current medical information, gathering information on past medical history/social history and prior level of function, interpretation of standardized testing/informal observation of tasks, assessment of data and development of plan of care and goals.             NAOMI Snider OTR/L; [id number]

## 2022-12-28 NOTE — PROGRESS NOTES
Department of Internal Medicine  Nephrology Consult Note    Events reviewed. SUBJECTIVE: We are following MrTanner Beckwith for RADHA on CKD. Reports no complaints.     PHYSICAL EXAM:      Vitals:    VITALS:  /75   Pulse 87   Temp 97.6 °F (36.4 °C) (Temporal)   Resp 16   Ht 5' 7\" (1.702 m)   Wt 239 lb (108.4 kg)   SpO2 96%   BMI 37.43 kg/m²   24HR INTAKE/OUTPUT:    Intake/Output Summary (Last 24 hours) at 12/28/2022 0857  Last data filed at 12/28/2022 4443  Gross per 24 hour   Intake 900 ml   Output 4200 ml   Net -3300 ml         Constitutional: Awake, NAD  HEENT: Atraumatic, normocephalic, PERRLA  Respiratory: CTA  Cardiovascular/Edema: RRR, normal S1, normal S2, trace edema  Gastrointestinal: Soft, nondistended, nontender  Neurologic: Focal  Skin:, Dry, no rashes, no lesions, blisters to BOAZ    Scheduled Meds:   insulin glargine  5 Units SubCUTAneous Nightly    [Held by provider] bumetanide  2 mg Oral BID    lidocaine PF  5 mL IntraDERmal Once    sodium chloride flush  5-40 mL IntraVENous 2 times per day    heparin flush  3 mL IntraVENous 2 times per day    [Held by provider] allopurinol  100 mg Oral Daily    aspirin  81 mg Oral Daily    atorvastatin  40 mg Oral Daily    vitamin D  2,000 Units Oral Daily    apixaban  5 mg Oral BID    ferrous sulfate  325 mg Oral Daily with breakfast    levothyroxine  88 mcg Oral Daily    [Held by provider] lisinopril  5 mg Oral Daily    [Held by provider] metoprolol succinate  25 mg Oral Daily    cefTRIAXone (ROCEPHIN) IV  1,000 mg IntraVENous Q24H    [Held by provider] gabapentin  600 mg Oral Daily    insulin lispro  0-8 Units SubCUTAneous TID WC    insulin lispro  0-4 Units SubCUTAneous Nightly     Continuous Infusions:   sodium chloride      dextrose       PRN Meds:.sodium chloride flush, sodium chloride, heparin flush, [Held by provider] oxyCODONE-acetaminophen **AND** [Held by provider] oxyCODONE, glucose, dextrose bolus **OR** dextrose bolus, glucagon (rDNA), dextrose, albuterol, [DISCONTINUED] dextrose bolus **OR** dextrose bolus, ondansetron **OR** ondansetron, polyethylene glycol, acetaminophen **OR** acetaminophen      DATA:    CBC with Differential:    Lab Results   Component Value Date/Time    WBC 8.1 12/28/2022 04:31 AM    RBC 3.83 12/28/2022 04:31 AM    HGB 11.3 12/28/2022 04:31 AM    HCT 36.1 12/28/2022 04:31 AM     12/28/2022 04:31 AM    MCV 94.3 12/28/2022 04:31 AM    MCH 29.5 12/28/2022 04:31 AM    MCHC 31.3 12/28/2022 04:31 AM    RDW 16.0 12/28/2022 04:31 AM    SEGSPCT 68 04/27/2011 10:30 AM    LYMPHOPCT 15.2 12/28/2022 04:31 AM    MONOPCT 10.1 12/28/2022 04:31 AM    EOSPCT 7 10/05/2010 10:52 AM    BASOPCT 0.7 12/28/2022 04:31 AM    MONOSABS 0.82 12/28/2022 04:31 AM    LYMPHSABS 1.23 12/28/2022 04:31 AM    EOSABS 0.46 12/28/2022 04:31 AM    BASOSABS 0.06 12/28/2022 04:31 AM     CMP:    Lab Results   Component Value Date/Time     12/28/2022 04:31 AM    K 3.5 12/28/2022 04:31 AM    K 6.5 12/25/2022 05:54 AM    CL 97 12/28/2022 04:31 AM    CO2 35 12/28/2022 04:31 AM    BUN 61 12/28/2022 04:31 AM    CREATININE 1.7 12/28/2022 04:31 AM    GFRAA >60 10/03/2022 10:55 AM    LABGLOM 43 12/28/2022 04:31 AM    GLUCOSE 184 12/28/2022 04:31 AM    GLUCOSE 133 04/18/2012 08:43 AM    PROT 7.4 12/28/2022 04:31 AM    LABALBU 3.6 12/28/2022 04:31 AM    LABALBU 4.4 10/25/2011 09:50 AM    CALCIUM 9.5 12/28/2022 04:31 AM    BILITOT 0.8 12/28/2022 04:31 AM    ALKPHOS 106 12/28/2022 04:31 AM    AST 24 12/28/2022 04:31 AM    ALT 39 12/28/2022 04:31 AM     Magnesium:    Lab Results   Component Value Date/Time    MG 1.7 12/28/2022 04:31 AM     Phosphorus:    Lab Results   Component Value Date/Time    PHOS 3.5 12/28/2022 04:31 AM     Radiology Review:      Kidney ultrasound December 25, 2022   1. Bilateral renal cortical thinning. There is no hydronephrosis on either   side. 2.  A Prince catheter is decompressing the bladder.              CT abdomen and pelvis 12/24/2022   Suspect mild acute pancreatitis. Mild splenomegaly. Bilateral lower lobe atelectasis. CXR 12/24/2022   No acute process. BRIEF SUMMARY OF INITIAL CONSULT:    Briefly, Mackenzie Hansen is a 68-year-old male known to us, past medical history CKD stage 3B probably 2/2 nephrosclerosis and previous episode of ischemic ATN requiring temporary HD in May 2017, recurrent episode of ischemic ATN in March 2022 also requiring HD x2 with fair recovery of renal function, baseline creatinine 1.4 to 1.5 mg/dL, HTN, DM type II, A. fib on apixaban, HFpEF 55 to 60%, NIKO, pacemaker placement, hypothyroidism, hyperlipidemia, who presented to the ED on 12/24/2022 via EMS from home for altered mental status and decreased appetite. ED work-up found patient hypoxic, initial lab work revealed potassium level 7.5, creatinine 5.9 with , reasons for this consultation. Hyperkalemia protocol given in ED. Home medications include lisinopril, bumetanide, gabapentin, metformin, empagliflozin, metoprolol. Problems resolved:  Hyperkalemia 2/2 decreased GFR and lisinopril. Resolved. IMPRESSION/RECOMMENDATIONS:        RADHA, stage III likely prerenal RADHA 2/2 intravascular volume depletion from poor oral intake in the setting of ACE inhibition versus cardiorenal syndrome versus ATN. Kidney ultrasound and CT abdomen and pelvis unrevealing. Creatinine peaked at 5.9 mg/dL, and has improved to 1.7 mg/dL, with excellent urine output 4.2 L, post-ATN diuresis? Continue to hold Bumex, will give acetazolamide 500 mg x 1 dose today. CKD stage IIIb likely 2/2 nephrosclerosis and previous episodes of ischemic ATN requiring HD x2 separate occasions.   Baseline creatinine 1.4 to 1.5 mg/dL    Hypernatremia 2/2 loop diuretics, improved with increased free water intake/decrease loop diuretics  Hypokalemia 2/2 loop diuretics, replaced  HTN, metoprolol and lisinopril on hold  HFpEF 55 to 60%, proBNP 6555> 4125, Bumex and metoprolol on hold  Normal pH with metabolic alkalosis, likely multifactorial 2/2 bicarbonate administration and contraction alkalosis, give acetazolamide 500 mg x 1 dose today  ------------------------------------------------------------------------------------  Encephalopathy, 2/2 uremia versus medication toxicity (gabapentin), hold gabapentin in view of severely decreased GFR  UTI, on ceftriaxone  Atrial fibrillation, on metoprolol and apixaban  Type II DM, holding metformin  Hypothyroidism on levothyroxine  NIKO  Nutrition: 2L FR    Plan:    Hold Bumex again today  Give acetazolamide 500 mg x 1 dose  Potassium chloride 20 mEq po x2 doses today  Continue fluid restriction to 2L  Continue to hold lisinopril  Continue to hold metoprolol  Continue to hold gabapentin  Continue strict I&O  BMP/venous pH at 4pm    Electronically signed by JACQUELINE Vincent CNP on 12/28/2022 at 8:57 AM

## 2022-12-28 NOTE — PROGRESS NOTES
[address] [facility]  Internal Medicine Residency / [address]    Attending Physician Statement  I have discussed the case, including pertinent history and exam findings with the resident and the team.  I have seen and examined the patient and the key elements of the encounter have been performed by me. I agree with the assessment, plan and orders as documented by the resident. Alert and baseline  Unable to tell me the month   Able to recall year at times  No sundowning  Immobile with Prince (ICU monitoring device)  A fib stable and question of underling vascular dementia   Mild delirium on treatment with Rocephin for UTI  Eating well, Labs reviewed  Plan; Discharge planning to [facility] reviewed  Remainder of medical problems as per resident note.       Delfina Jones MD [facility] SPECIALTY [facility]  Internal Medicine Residency Faculty

## 2022-12-29 LAB
ALBUMIN SERPL-MCNC: 3.4 G/DL (ref 3.5–5.2)
ALP BLD-CCNC: 105 U/L (ref 40–129)
ALT SERPL-CCNC: 34 U/L (ref 0–40)
ANION GAP SERPL CALCULATED.3IONS-SCNC: 13 MMOL/L (ref 7–16)
AST SERPL-CCNC: 20 U/L (ref 0–39)
BASOPHILS ABSOLUTE: 0.1 E9/L (ref 0–0.2)
BASOPHILS RELATIVE PERCENT: 1.1 % (ref 0–2)
BILIRUB SERPL-MCNC: 0.9 MG/DL (ref 0–1.2)
BUN BLDV-MCNC: 47 MG/DL (ref 6–23)
CALCIUM IONIZED: 1.28 MMOL/L (ref 1.15–1.33)
CALCIUM SERPL-MCNC: 9.6 MG/DL (ref 8.6–10.2)
CHLORIDE BLD-SCNC: 102 MMOL/L (ref 98–107)
CO2: 25 MMOL/L (ref 22–29)
CREAT SERPL-MCNC: 1.6 MG/DL (ref 0.7–1.2)
EOSINOPHILS ABSOLUTE: 0.69 E9/L (ref 0.05–0.5)
EOSINOPHILS RELATIVE PERCENT: 7.4 % (ref 0–6)
GFR SERPL CREATININE-BSD FRML MDRD: 46 ML/MIN/1.73
GLUCOSE BLD-MCNC: 176 MG/DL (ref 74–99)
HCT VFR BLD CALC: 41.1 % (ref 37–54)
HEMOGLOBIN: 13.1 G/DL (ref 12.5–16.5)
IMMATURE GRANULOCYTES #: 0.15 E9/L
IMMATURE GRANULOCYTES %: 1.6 % (ref 0–5)
LYMPHOCYTES ABSOLUTE: 1.61 E9/L (ref 1.5–4)
LYMPHOCYTES RELATIVE PERCENT: 17.2 % (ref 20–42)
MAGNESIUM: 1.9 MG/DL (ref 1.6–2.6)
MCH RBC QN AUTO: 29.4 PG (ref 26–35)
MCHC RBC AUTO-ENTMCNC: 31.9 % (ref 32–34.5)
MCV RBC AUTO: 92.4 FL (ref 80–99.9)
METER GLUCOSE: 190 MG/DL (ref 74–99)
METER GLUCOSE: 194 MG/DL (ref 74–99)
METER GLUCOSE: 195 MG/DL (ref 74–99)
METER GLUCOSE: 210 MG/DL (ref 74–99)
MONOCYTES ABSOLUTE: 0.69 E9/L (ref 0.1–0.95)
MONOCYTES RELATIVE PERCENT: 7.4 % (ref 2–12)
NEUTROPHILS ABSOLUTE: 6.12 E9/L (ref 1.8–7.3)
NEUTROPHILS RELATIVE PERCENT: 65.3 % (ref 43–80)
PDW BLD-RTO: 15.9 FL (ref 11.5–15)
PH VENOUS: 7.38 (ref 7.35–7.45)
PHOSPHORUS: 3.4 MG/DL (ref 2.5–4.5)
PLATELET # BLD: 280 E9/L (ref 130–450)
PMV BLD AUTO: 9.4 FL (ref 7–12)
POTASSIUM SERPL-SCNC: 3.7 MMOL/L (ref 3.5–5)
RBC # BLD: 4.45 E12/L (ref 3.8–5.8)
SODIUM BLD-SCNC: 140 MMOL/L (ref 132–146)
TOTAL PROTEIN: 7.4 G/DL (ref 6.4–8.3)
WBC # BLD: 9.4 E9/L (ref 4.5–11.5)

## 2022-12-29 PROCEDURE — 82330 ASSAY OF CALCIUM: CPT

## 2022-12-29 PROCEDURE — 6370000000 HC RX 637 (ALT 250 FOR IP): Performed by: FAMILY MEDICINE

## 2022-12-29 PROCEDURE — 6370000000 HC RX 637 (ALT 250 FOR IP)

## 2022-12-29 PROCEDURE — 82962 GLUCOSE BLOOD TEST: CPT

## 2022-12-29 PROCEDURE — 94660 CPAP INITIATION&MGMT: CPT

## 2022-12-29 PROCEDURE — 97535 SELF CARE MNGMENT TRAINING: CPT

## 2022-12-29 PROCEDURE — 80053 COMPREHEN METABOLIC PANEL: CPT

## 2022-12-29 PROCEDURE — 82800 BLOOD PH: CPT

## 2022-12-29 PROCEDURE — S5553 INSULIN LONG ACTING 5 U: HCPCS | Performed by: STUDENT IN AN ORGANIZED HEALTH CARE EDUCATION/TRAINING PROGRAM

## 2022-12-29 PROCEDURE — 1200000000 HC SEMI PRIVATE

## 2022-12-29 PROCEDURE — 36415 COLL VENOUS BLD VENIPUNCTURE: CPT

## 2022-12-29 PROCEDURE — 85025 COMPLETE CBC W/AUTO DIFF WBC: CPT

## 2022-12-29 PROCEDURE — 6370000000 HC RX 637 (ALT 250 FOR IP): Performed by: STUDENT IN AN ORGANIZED HEALTH CARE EDUCATION/TRAINING PROGRAM

## 2022-12-29 PROCEDURE — 97530 THERAPEUTIC ACTIVITIES: CPT

## 2022-12-29 PROCEDURE — 2580000003 HC RX 258: Performed by: NURSE PRACTITIONER

## 2022-12-29 PROCEDURE — 99238 HOSP IP/OBS DSCHRG MGMT 30/<: CPT | Performed by: INTERNAL MEDICINE

## 2022-12-29 PROCEDURE — 84100 ASSAY OF PHOSPHORUS: CPT

## 2022-12-29 PROCEDURE — 83735 ASSAY OF MAGNESIUM: CPT

## 2022-12-29 RX ORDER — HYDROXYZINE HYDROCHLORIDE 10 MG/1
10 TABLET, FILM COATED ORAL 3 TIMES DAILY PRN
Status: DISCONTINUED | OUTPATIENT
Start: 2022-12-29 | End: 2022-12-30 | Stop reason: HOSPADM

## 2022-12-29 RX ADMIN — Medication 2000 UNITS: at 08:13

## 2022-12-29 RX ADMIN — APIXABAN 5 MG: 5 TABLET, FILM COATED ORAL at 20:01

## 2022-12-29 RX ADMIN — ASPIRIN 81 MG: 81 TABLET, COATED ORAL at 08:13

## 2022-12-29 RX ADMIN — INSULIN LISPRO 2 UNITS: 100 INJECTION, SOLUTION INTRAVENOUS; SUBCUTANEOUS at 12:26

## 2022-12-29 RX ADMIN — SODIUM CHLORIDE, PRESERVATIVE FREE 10 ML: 5 INJECTION INTRAVENOUS at 08:13

## 2022-12-29 RX ADMIN — FERROUS SULFATE TAB 325 MG (65 MG ELEMENTAL FE) 325 MG: 325 (65 FE) TAB at 08:13

## 2022-12-29 RX ADMIN — ACETAMINOPHEN 650 MG: 325 TABLET ORAL at 20:01

## 2022-12-29 RX ADMIN — INSULIN GLARGINE-YFGN 5 UNITS: 100 INJECTION, SOLUTION SUBCUTANEOUS at 20:01

## 2022-12-29 RX ADMIN — LEVOTHYROXINE SODIUM 88 MCG: 0.09 TABLET ORAL at 06:50

## 2022-12-29 RX ADMIN — HYDROXYZINE HYDROCHLORIDE 10 MG: 10 TABLET ORAL at 20:02

## 2022-12-29 RX ADMIN — APIXABAN 5 MG: 5 TABLET, FILM COATED ORAL at 08:13

## 2022-12-29 RX ADMIN — SODIUM CHLORIDE, PRESERVATIVE FREE 10 ML: 5 INJECTION INTRAVENOUS at 20:55

## 2022-12-29 RX ADMIN — ATORVASTATIN CALCIUM 40 MG: 40 TABLET, FILM COATED ORAL at 08:13

## 2022-12-29 RX ADMIN — HYDROXYZINE HYDROCHLORIDE 10 MG: 10 TABLET ORAL at 03:01

## 2022-12-29 ASSESSMENT — PAIN DESCRIPTION - DESCRIPTORS: DESCRIPTORS: ACHING;DULL;DISCOMFORT

## 2022-12-29 ASSESSMENT — PAIN SCALES - GENERAL: PAINLEVEL_OUTOF10: 5

## 2022-12-29 ASSESSMENT — PAIN DESCRIPTION - ORIENTATION: ORIENTATION: LOWER

## 2022-12-29 ASSESSMENT — PAIN DESCRIPTION - LOCATION: LOCATION: BACK

## 2022-12-29 NOTE — PROGRESS NOTES
Occupational Therapy  OT BEDSIDE TREATMENT NOTE   [facility] [address] [facility]  [address] [place] [address]        ABCP:[id number]  Patient Name: Belen Jarquin  MRN: [id number]  : 1955  Room: [address] [place] [address]A     Per OT Eval:    Evaluating OT: Daniel Fay, [address] OTR/L; [id number]       Referring Provider: Olya Mott MD    Specific Provider Orders/Date: OT Eval and Treat 22       Diagnosis: Hyperkalemia     Surgery: none this admission    Pertinent Medical History:  has a past medical history of RADHA (acute kidney injury) ([address] Atrial fibrillation ([address] Carpal tunnel syndrome, Encounter regarding vascular access for dialysis for ESRD ([address] Hyperlipidemia, Hypertension, Hypothyroidism, Lung nodule, Nocturnal hypoxemia due to obesity, Obesity, NIKO (obstructive sleep apnea), Osteoarthritis, Pinched nerve, Restrictive lung disease secondary to obesity, Sinus node dysfunction ([address] and Type II or unspecified type diabetes mellitus without mention of complication, not stated as uncontrolled.        Reason for Admission: confusion, decreased appetite, incontinent of stool and urine     Recommended Adaptive Equipment:  TBD pending progression/discharge plan      Precautions:  Fall Risk, Cognition, TSM, Prince Catheter, Cognition      Assessment of current deficits:    [x] Functional mobility            [x]ADLs           [x] Strength                  [x]Cognition    [x] Functional transfers          [x] IADLs         [x] Safety Awareness   [x]Endurance    [x] Fine Coordination                         [x] Balance      [] Vision/perception   []Sensation      []Gross Motor Coordination             [] ROM           [] Delirium                   [] Motor Control      OT PLAN OF CARE   OT POC based on physician orders, patient diagnosis and results of clinical assessment     Frequency/Duration: 1-3 days/wk for 2 weeks PRN   Specific OT Treatment Interventions to include:   * Instruction/training on adapted ADL techniques and AE recommendations to increase functional independence within precautions       * Training on energy conservation strategies, correct breathing pattern and techniques to improve independence/tolerance for self-care routine  * Functional transfer/mobility training/DME recommendations for increased independence, safety, and fall prevention  * Patient/Family education to increase follow through with safety techniques and functional independence  * Recommendation of environmental modifications for increased safety with functional transfers/mobility and ADLs  * Cognitive retraining/development of therapeutic activities to improve problem solving, judgement, memory, and attention for increased safety/participation in ADL/IADL tasks  * Therapeutic exercise to improve motor endurance, ROM, and functional strength for ADLs/functional transfers  * Therapeutic activities to facilitate/challenge dynamic balance, stand tolerance for increased safety and independence with ADLs     Home Living:  Pt lives with his wife in a 2 story home with 3 steps and BHR to enter. Bed and bath on 2nd floor. Half bath on 1st floor. Full flight of steps and 1HR to access.    Bathroom setup:  tub/shower unit, standard commode  Equipment owned: SPC, ww, w/c, BSC, shower chair, Home O2 (pt reports for sleeping)  Per chart - [facility] [employer], [address] [place] [address] bed     Available Family Assist:  wife works full time - pt reports planning to retire     Prior Level of Function:  IND with ADLs  Dependent on wife for IADLs   Ambulation - using cane PRN   Driving: Wife provides transportation  Occupation:  per chart - Retired  - Works as a  during the school year     Pain Level: Pt did not complain of pain this session  Cognition: A&O 2-3/4 - oriented to self, location, stated month as \"12th month\" correct to December, unable to state year corrected with choices       Follows multi step commands - fair affect              Memory:  FAIR -              Sequencing: FAIR -              Problem solving:  FAIR -              Judgement/safety:  FAIR -        Vitals/Lab Values:  WFL room air                   Functional Assessment:  AM-PAC Daily Activity Raw Score: 14/24    Initial Eval Status  Date: 12/28/22    Treatment Status  Date: 12/29/22 STGs = LTGs  Time frame: 10-14 days   Feeding SBA  Tray set up seated in bedside chair        Setup  Pt able to grasp cup, bring to mouth Mod Ind   Grooming SBA   washing hands sink side     Bal  Pt washed face, applied deodorant, combed hair, brushed teeth seated upright in chair, assistance to shave face     Mod Ind   UB Dressing Min A  Doff/afshin gown seated in bathroom    Bal  Afshin/doff hospital gown seated upright in chair  Mod Ind      LB Dressing Dep  Afshin socks bed level     maxA  Afshin/doff hospital socks, with assistance to thread pants and stand to pull over hips Mod Ind      Bathing Mod A   Limited functional reach for LB bathing     modA  Sponge bathing task seated/standing at sink, pt able to wash of UB and barbara area, assistance to wash of LE's and stand to wash of buttocks Mod Ind      Toileting Min A  Assist with posterior pericare       Pt having of vallecillo catheter  Mod Ind      Bed Mobility  Supine to sit: SBA  Sit to supine: SBA      SBA  Supine<>EOB   Supine to sit: Mod Ind  Sit to supine: Mod Ind      Functional Transfers Sit to Stand: CGA   Stand to Sit: CGA  Stand Pivot: CGA  Commode: CGA     modA  Sit to Stand  Stand to Sit    Cueing for hand placement Mod Ind      Functional Mobility CGA with ww  Within room, hallway, and bathroom    CGA  Pt ambulated short household distance in room EOB<>Bathroom with w.w.      Mod Ind with AD      Balance Sitting:     Static:  SBA    Dynamic:  SBA  Standing: CGA     Sitting EOB:  SBA    Standing:  Bal Sitting:     Static:  Sup    Dynamic:  Sup  Standing:     Static:  Mod Ind Activity Tolerance Fair(+)        Fair- Good   Visual/  Perceptual     Hearing: WNL   Glasses: No     WFL   Hearing Aids:  No           Vitals HR:  BPM      SpO2 on 4L NC: 99%  SpO2 on 2L NC during functional mobility: 97%  SpO2 on RA during functional mobility: 91-96%          Hand Dominance: Right    AROM Strength Additional Info:    RUE  WFL Grossly tested Good ;   Good FMC/dexterity noted during ADL tasks      LUE WFL Grossly tested Good ;    Good FMC/dexterity noted during ADL tasks         Sensation:  Denies numbness or tingling Eliu UEs   Tone: WFL Eliu UEs   Edema: None Noted Eliu UEs;  Moderate edema Distal Eliu LEs              Education:  Pt was educated on role of OT, goals to be reached, importance of OOB activity, safety and hand placement with transfers, safety/balance and walker management with functional mobility, and compensatory strategies to assist with ADL tasks    Comments: Upon arrival pt supine in bed, agreeable to therapy, speaking with nursing okaying pt to be seen this session. Pt completed of bed mobility, functional mobility, transfers and ADL tasks this session. At end of session, pt seated upright in chair, all lines and tubes intact, call light within reach, nursing aware. Pt has made slow progress towards set goals.    Continue with current plan of care focusing on increasing of independency with transfers and ADL tasks      Treatment Time In: 9:10am           Treatment Time Out: 9:50am                Treatment Charges: Mins Units   Ther Ex  [id number]     Manual Therapy 48881     Thera Activities 37230 8 1   ADL/Home Mgt [id number] 32 2   Neuro Re-ed [id number]     Group Therapy      Orthotic manage/training  [id number]     Non-Billable Time     Total Timed Treatment 40 3        Barb Armin BEVERLY/L [id number]

## 2022-12-29 NOTE — PROGRESS NOTES
[address] [facility]  Internal Medicine Residency / [address]    Attending Physician Statement  I have discussed the case, including pertinent history and exam findings with the resident and the team.  I have seen and examined the patient and the key elements of the encounter have been performed by me. I agree with the assessment, plan and orders as documented by the resident. A&O  2029  otherwise 2/3  Minicog 3/3   Clock 3/4 and trouble with hands, digitalization  Traficant vague   Medical problems and meds reviewed   Impression; Probable early MCI vs dementia   Plan; Transfer to [facility] of medical problems as per resident note.       Collin Munguia MD [facility]  Internal Medicine Residency Faculty

## 2022-12-29 NOTE — CARE COORDINATION
12/29/2022 - Updated CM note - admitted for hyperkalemia. Nephrology following. Cr 1.6 today. Pt will not require HD. He states he has a small sized cpap machine at home and can have family bring it to the facility. Referral made to [facility] and they can accept per [facility]. She will submit for precert today. HENS initiated and pending system. SW/CM will follow.

## 2022-12-29 NOTE — DISCHARGE INSTR - COC
Continuity of Care Form    Patient Name: Ryan Hancock   :  1955  MRN:  [id number]    Admit date:  2022  Discharge date:  22      Code Status Order: Full Code   Advance Directives:     Admitting Physician:  Judge Gamaliel MD  PCP: Lori Lamas MD    Discharging Nurse: niya maldonado [facility] Unit/Room#: 9458/2542-W  Discharging Unit Phone Number: [phone]      Emergency Contact:   Extended Emergency Contact Information  Primary Emergency Contact: Petra Rocha  Address: [address] [place] [address] [address] Phone: [phone]  Work Phone: [phone]  Mobile Phone: [phone]  Relation: Spouse  Secondary Emergency Contact: Luke Espinoza of [address] Phone: [phone]  Relation: Child    Past Surgical History:  Past Surgical History:   Procedure Laterality Date    BACK SURGERY      [facility]. Pinched nerve in back    BACK SURGERY  2017    BACK SURGERY N/A 2022    EXCISON OF SOFT TISSUE NEOPLASM OF UPPER BACK performed by Panchito Delcid MD at [address] [facility]      multiple colon polyps    COLONOSCOPY  2012    COLONOSCOPY    COLONOSCOPY  2022    polyp; diverticula--sarah    COLONOSCOPY N/A 2022    COLONOSCOPY POLYPECTOMY HOT BIOPSY (Snare) performed by Panchito Delcid MD at [address]      lennie cataracts implant    HERNIA REPAIR      umbilical    PACEMAKER PLACEMENT  10/03/2017    Medtronic dual chamber    [facility]        Immunization History:   Immunization History   Administered Date(s) Administered    COVID-19, MODERNA BLUE border, Primary or Immunocompromised, (age 12y+), IM, 100 mcg/0.5mL 2021, 2021, 2021    Influenza 2013    Influenza Virus Vaccine 10/21/2010, 2013, 10/28/2014, 10/14/2015, 2016, 10/30/2017, 2020    Influenza, FLUAD, (age 72 y+), Adjuvanted, 0.5mL 08/25/2020, 10/11/2022    Influenza, FLUARIX, FLULAVAL, Joe Comp (age 10 mo+) AND AFLURIA, (age 1 y+), PF, 0.5mL 10/01/2018, 08/25/2020    Influenza, FLUZONE (age 72 y+), High Dose, 0.7mL 10/11/2021    Influenza, Quadv, 6 mo and older, IM (Fluzone, Flulaval) 10/30/2017    Influenza, Triv, 3 Years and older, IM (Afluria (5 yrs and older) 09/21/2016    Influenza, Triv, inactivated, subunit, adjuvanted, IM (Fluad 65 yrs and older) 10/18/2018, 10/15/2019    Pneumococcal Conjugate 13-valent (Kcleowx08) 02/03/2017    Pneumococcal Conjugate 7-valent (Prevnar7) 08/10/2007    Pneumococcal Polysaccharide (Fglbbfnjl41) 08/08/2012, 08/27/2018, 10/01/2018    Td, unspecified formulation 01/01/2005    Tdap (Boostrix, Adacel) 09/21/2016    Zoster Live (Zostavax) 10/23/2015       Active Problems:  Patient Active Problem List   Diagnosis Code    DM (diabetes mellitus) ([facility] E11.9    Class 2 obesity due to excess calories with serious comorbidity and body mass index (BMI) of 38.0 to 38.9 in adult [id number]    Hyperlipidemia E78.5    Essential hypertension I10    Hypothyroidism E03.9    Lung nodules R91.8    Arthritis of shoulder region, left M19.012    OA (osteoarthritis of the spine) M47.9    S/P laminectomy with spinal fusion Z98.1    Colorectal polyps K63.5, K62.1    Diverticula of colon K57.30    NIKO (obstructive sleep apnea) G47.33    Hypoxemia R09.02    Restrictive lung disease secondary to obesity J98.4, E66.9    Atypical atrial flutter (HCC) I48.4    Sinus node dysfunction (HCC) I49.5    Pacemaker Z95.0    Persistent atrial fibrillation (HCC) I48.19    Diabetes mellitus type 2 in obese (HCC) E11.69, E66.9    Chronic obstructive pulmonary disease, unspecified COPD type ([facility] J44.9    Acute heart failure with preserved ejection fraction (HFpEF) (HCC) I50.31    Chronic hypercapnic respiratory failure ([facility] J96.12    Encounter regarding vascular access for dialysis for ESRD ([facility] N18.6, Z99.2    At risk for colon cancer Z91.89 Acute on chronic congestive heart failure (HCC) I50.9    Acute on chronic diastolic heart failure (HCC) I50.33    Normocytic anemia D64.9    Stage 3b chronic kidney disease (HCC) N18.32    Acute on chronic heart failure with preserved ejection fraction (HCC) I50.33    Chronic kidney disease, stage 2 (mild) N18.2    Hypercalcemia E83.52    Proteinuria due to type 2 diabetes mellitus ([facility] E11.29, R80.9    Skin lesion of face L98.9    Hyperkalemia E87.5       Isolation/Infection:   Isolation            No Isolation          Patient Infection Status       Infection Onset Added Last Indicated Last Indicated By Review Planned Expiration Resolved Resolved By    None active    Resolved    COVID-19 (Rule Out) 12/24/22 12/24/22 12/25/22 Respiratory Panel, Molecular, with COVID-19 (Restricted: peds pts or suitable admitted adults) (Ordered)   12/25/22 Rule-Out Test Resulted    COVID-19 (Rule Out) 12/24/22 12/24/22 12/25/22 Respiratory Panel, Molecular, with COVID-19 (Restricted: peds pts or suitable admitted adults) (Ordered)   12/24/22 Rule-Out Test Resulted    COVID-19 (Rule Out) 09/17/21 09/17/21 09/17/21 COVID-19, Rapid (Ordered)   09/17/21 Rule-Out Test Resulted            Nurse Assessment:  Last Vital Signs: BP (!) 133/59   Pulse 84   Temp 97.9 °F (36.6 °C) (Temporal)   Resp 16   Ht 5' 7\" (1.702 m)   Wt 239 lb (108.4 kg)   SpO2 95%   BMI 37.43 kg/m²     Last documented pain score (0-10 scale): Pain Level: 4  Last Weight:   Wt Readings from Last 1 Encounters:   12/24/22 239 lb (108.4 kg)     Mental Status:  oriented, alert, coherent, logical, thought processes intact, and able to concentrate and follow conversation    IV Access:  - None    Nursing Mobility/ADLs:  Walking   Assisted  Transfer  Assisted  Bathing  Assisted  Dressing  Assisted  Toileting  Assisted  Feeding  Independent  Med Admin  Assisted  Med Delivery   whole    Wound Care Documentation and Therapy:  Wound 12/25/22 Brachial Anterior; Left (Active) Wound Etiology Other 12/29/22 0830   Dressing Status Clean;Dry; Intact 12/29/22 0830   Wound Cleansed Cleansed with saline 12/29/22 0304   Dressing/Treatment Other (comment) 12/29/22 0304   Wound Length (cm) 12 cm 12/26/22 0328   Wound Width (cm) 11 cm 12/26/22 0328   Wound Depth (cm) 0.1 cm 12/26/22 0328   Wound Surface Area (cm^2) 132 cm^2 12/26/22 0328   Wound Volume (cm^3) 13.2 cm^3 12/26/22 0328   Drainage Amount Large 12/26/22 2000   Drainage Description Clear 12/26/22 2000   Odor None 12/26/22 0328   Porsha-wound Assessment Fragile 12/26/22 0328   Number of days: 3       Wound 12/25/22 Brachial Anterior;Left;Proximal (Active)   Wound Etiology Other 12/29/22 0830   Dressing Status Other (Comment) 12/29/22 0830   Wound Cleansed Cleansed with saline 12/29/22 0304   Dressing/Treatment ABD;Roll gauze 12/26/22 0325   Wound Length (cm) 4.5 cm 12/26/22 0325   Wound Width (cm) 3.5 cm 12/26/22 0325   Wound Depth (cm) 0.1 cm 12/26/22 0325   Wound Surface Area (cm^2) 15.75 cm^2 12/26/22 0325   Wound Volume (cm^3) 1.575 cm^3 12/26/22 0325   Wound Assessment Pink/red 12/26/22 2000   Drainage Amount Small 12/26/22 2000   Drainage Description Clear 12/26/22 2000   Odor None 12/26/22 0325   Porsha-wound Assessment Fragile 12/26/22 0325   Number of days: 3       Incision 11/08/22 Back Medial;Upper (Active)   Dressing Status Clean;Dry; Intact 12/29/22 0830   Incision Cleansed Cleansed with saline 12/29/22 0304   Dressing/Treatment Dry dressing 12/29/22 0304   Incision Length (cm) 2 12/26/22 0324   Incision Width (cm) 0.7 cm 12/26/22 0324   Incision Depth (cm) 0.2 cm 12/26/22 0324   Closure Open to air 12/26/22 0324   Incision Assessment Dry 12/26/22 0324   Drainage Amount Scant 12/26/22 0324   Drainage Description Serosanguinous 12/26/22 0324   Odor None 12/26/22 0324   Porsha-incision Assessment Intact 12/26/22 0324   Number of days: 51        Elimination:  Continence: Bowel: Yes  Bladder: Yes  Urinary Catheter:  Insertion Date: 12-24-22  maintain vallecillo catheter per nephrology Dr Sheridan Palomino, please keep strict I & O  Colostomy/Ileostomy/Ileal Conduit: no         Date of Last BM: 12-30-22        Intake/Output Summary (Last 24 hours) at 12/29/2022 1031  Last data filed at 12/29/2022 0658  Gross per 24 hour   Intake 1560 ml   Output 2400 ml   Net -840 ml     I/O last 3 completed shifts: In: 2340 [P.O.:2340]  Out: 3150 [Urine:3150]    Safety Concerns: At Risk for Falls    Impairments/Disabilities:      None    Nutrition Therapy:  Current Nutrition Therapy:   - Oral Diet:  Carb Control 4 carbs/meal (1800kcals/day), Low Sodium (2gm), and low potassium  < 3000 mg/qd easy to chew and fluid restriction 1200ml/24 hrs      Routes of Feeding: Oral  Liquids: No Restrictions  Daily Fluid Restriction: yes - amount 1200ml/24 hrs  Last Modified Barium Swallow with Video (Video Swallowing Test): not done    Treatments at the Time of Hospital Discharge:   Respiratory Treatments: n/a    Oxygen Therapy:  is not on home oxygen therapy.   Ventilator:    {MH CC Vent LWXF:[id number]}    Rehab Therapies: Physical Therapy and Occupational Therapy  Weight Bearing Status/Restrictions: No weight bearing restrictions  Other Medical Equipment (for information only, NOT a DME order):  hospital bed  Other Treatments: n/a      Patient's personal belongings (please select all that are sent with patient):  None    RN SIGNATURE:  Electronically signed by Nneka Odonnell RN on 12/30/22 at 11:38 AM EST    CASE MANAGEMENT/SOCIAL WORK SECTION    Inpatient Status Date: 12/25/2022    Readmission Risk Assessment Score:  Readmission Risk              Risk of Unplanned Readmission:  40           Discharging to Facility/ Agency   Name: [address] [place] [address]  Fax:[phone]    Dialysis Facility (if applicable)   Name:  Address:  Dialysis Schedule:  Phone:  Fax:    / signature: Electronically signed by Ally Estrada RN on 12/29/22 at 10:33 AM EST    PHYSICIAN SECTION    Prognosis: Good    Condition at Discharge: Stable    Rehab Potential (if transferring to Rehab): Good    Recommended Labs or Other Treatments After Discharge: BMP in 5 days to check renal function. Physician Certification: I certify the above information and transfer of Ryan Hancock  is necessary for the continuing treatment of the diagnosis listed and that he requires Acute Rehab for less 30 days. Update Admission H&P: No changes to H&P except as follows: Mr. Katy Marquez, 69year old male, with PMH of atrial fibrillation on eliquis, hypothyroidism, NIKO, sinus node dysfunction, CKD,  type 2 diabetes, nocturnal oxygen dependent on 2.5 L presented to ED due to AMS. Patient was brought in by his wife. He was found to have elevated potassium on presentation at 7.5. Creatinine was elevated at 5.9 (baseline creatinine around 1.3-1.5). Hyperkalemia treatment was initiated in the ED and nephrology was consulted. Patient was started on normal saline at 125 cc/h and was admitted to the general medical floors. Patient was only oriented to himself. On 12/25 patient was started on Bumex drip and transferred to the MICU for further management with concerns for possible dialysis if no improvement in BMP. Patient had poor vascular access and general surgery was consulted on 12/25 for central line insertion and right subclavian. Patient was pancultured finding for source of altered mental status. UA showed small leukocyte esterase and few bacteria was started on ceftriaxone on 12/25. Over hospital course cultures have all come back negative. Ceftriaxone was discontinued on 12/28. Renal function started to improve and Bumex drip was discontinued and patient was continued on oral Bumex. Oral Bumex was then placed on hold per nephrology due to BP and increased sodium during admission.  Patient continued to be altered and only oriented to person and place. Wife was agreeable to set up referral so patient could have skilled facility at discharge. AM-PAC score was 16/24. Prince has been in place during stay and will remain at discharge to facility. During course his Neurontin was held due to altered mental status and can be discussed at hospital follow up for when to restart this medications. Other medications that were held during admission were lisinopril, metoprolol succinate and Bumex. Blood pressure continued to be stable without medications and labs improved as well. Can discuss restarting medications in outpatient setting as tolerated. Prior to discharge a mini-cognition exam was conducted on the patient for his altered mental status. He was able to recall all three objects. However he was unable to draw the hands on a clock at this time. He is still alert and oriented to self and place and has trouble to recalling the year. Patient had bulla on left arm and he denied any pain just some minor itchiness around the sites. He remained stable and oxygen was able to be weaned down and patient is on room air on maintaining adequate saturations.      PHYSICIAN SIGNATURE:  Electronically signed by Marcelo Lorenz MD on 12/30/22 at 11:25 AM EST

## 2022-12-29 NOTE — PROGRESS NOTES
Department of Internal Medicine  Nephrology Consult Note    Events reviewed. SUBJECTIVE: We are following Mr. Gonzalez Steinberg for RADHA on CKD. Reports no complaints.     PHYSICAL EXAM:      Vitals:    VITALS:  BP (!) 133/59   Pulse 84   Temp 97.9 °F (36.6 °C) (Temporal)   Resp 16   Ht 5' 7\" (1.702 m)   Wt 239 lb (108.4 kg)   SpO2 95%   BMI 37.43 kg/m²   24HR INTAKE/OUTPUT:    Intake/Output Summary (Last 24 hours) at 12/29/2022 1017  Last data filed at 12/29/2022 8841  Gross per 24 hour   Intake 1560 ml   Output 2400 ml   Net -840 ml         Constitutional: Awake, NAD  HEENT: Atraumatic, normocephalic, PERRLA  Respiratory: CTA  Cardiovascular/Edema: RRR, normal S1, normal S2, trace edema  Gastrointestinal: Soft, nondistended, nontender  Neurologic: Focal  Skin:, Dry, no rashes, no lesions, blisters to BOAZ    Scheduled Meds:   insulin glargine  5 Units SubCUTAneous Nightly    [Held by provider] bumetanide  2 mg Oral BID    lidocaine PF  5 mL IntraDERmal Once    sodium chloride flush  5-40 mL IntraVENous 2 times per day    heparin flush  3 mL IntraVENous 2 times per day    [Held by provider] allopurinol  100 mg Oral Daily    aspirin  81 mg Oral Daily    atorvastatin  40 mg Oral Daily    vitamin D  2,000 Units Oral Daily    apixaban  5 mg Oral BID    ferrous sulfate  325 mg Oral Daily with breakfast    levothyroxine  88 mcg Oral Daily    [Held by provider] lisinopril  5 mg Oral Daily    [Held by provider] metoprolol succinate  25 mg Oral Daily    [Held by provider] gabapentin  600 mg Oral Daily    insulin lispro  0-8 Units SubCUTAneous TID     insulin lispro  0-4 Units SubCUTAneous Nightly     Continuous Infusions:   sodium chloride      dextrose       PRN Meds:.hydrOXYzine HCl, sodium chloride flush, sodium chloride, heparin flush, [Held by provider] oxyCODONE-acetaminophen **AND** [Held by provider] oxyCODONE, glucose, dextrose bolus **OR** dextrose bolus, glucagon (rDNA), dextrose, albuterol, [DISCONTINUED] dextrose bolus **OR** dextrose bolus, ondansetron **OR** ondansetron, polyethylene glycol, acetaminophen **OR** acetaminophen      DATA:    CBC with Differential:    Lab Results   Component Value Date/Time    WBC 9.4 12/29/2022 06:35 AM    RBC 4.45 12/29/2022 06:35 AM    HGB 13.1 12/29/2022 06:35 AM    HCT 41.1 12/29/2022 06:35 AM     12/29/2022 06:35 AM    MCV 92.4 12/29/2022 06:35 AM    MCH 29.4 12/29/2022 06:35 AM    MCHC 31.9 12/29/2022 06:35 AM    RDW 15.9 12/29/2022 06:35 AM    SEGSPCT 68 04/27/2011 10:30 AM    LYMPHOPCT 17.2 12/29/2022 06:35 AM    MONOPCT 7.4 12/29/2022 06:35 AM    EOSPCT 7 10/05/2010 10:52 AM    BASOPCT 1.1 12/29/2022 06:35 AM    MONOSABS 0.69 12/29/2022 06:35 AM    LYMPHSABS 1.61 12/29/2022 06:35 AM    EOSABS 0.69 12/29/2022 06:35 AM    BASOSABS 0.10 12/29/2022 06:35 AM     CMP:    Lab Results   Component Value Date/Time     12/29/2022 06:35 AM    K 3.7 12/29/2022 06:35 AM    K 6.5 12/25/2022 05:54 AM     12/29/2022 06:35 AM    CO2 25 12/29/2022 06:35 AM    BUN 47 12/29/2022 06:35 AM    CREATININE 1.6 12/29/2022 06:35 AM    GFRAA >60 10/03/2022 10:55 AM    LABGLOM 46 12/29/2022 06:35 AM    GLUCOSE 176 12/29/2022 06:35 AM    GLUCOSE 133 04/18/2012 08:43 AM    PROT 7.4 12/29/2022 06:35 AM    LABALBU 3.4 12/29/2022 06:35 AM    LABALBU 4.4 10/25/2011 09:50 AM    CALCIUM 9.6 12/29/2022 06:35 AM    BILITOT 0.9 12/29/2022 06:35 AM    ALKPHOS 105 12/29/2022 06:35 AM    AST 20 12/29/2022 06:35 AM    ALT 34 12/29/2022 06:35 AM     Magnesium:    Lab Results   Component Value Date/Time    MG 1.9 12/29/2022 06:35 AM     Phosphorus:    Lab Results   Component Value Date/Time    PHOS 3.4 12/29/2022 06:35 AM     Radiology Review:      Kidney ultrasound December 25, 2022   1. Bilateral renal cortical thinning. There is no hydronephrosis on either   side. 2.  A Prince catheter is decompressing the bladder.              CT abdomen and pelvis 12/24/2022   Suspect mild acute pancreatitis. Mild splenomegaly. Bilateral lower lobe atelectasis. CXR 12/24/2022   No acute process. BRIEF SUMMARY OF INITIAL CONSULT:    Briefly, Hugo Whitaker is a 68-year-old male known to us, past medical history CKD stage 3B probably 2/2 nephrosclerosis and previous episode of ischemic ATN requiring temporary HD in May 2017, recurrent episode of ischemic ATN in March 2022 also requiring HD x2 with fair recovery of renal function, baseline creatinine 1.4 to 1.5 mg/dL, HTN, DM type II, A. fib on apixaban, HFpEF 55 to 60%, NIKO, pacemaker placement, hypothyroidism, hyperlipidemia, who presented to the ED on 12/24/2022 via EMS from home for altered mental status and decreased appetite. ED work-up found patient hypoxic, initial lab work revealed potassium level 7.5, creatinine 5.9 with , reasons for this consultation. Hyperkalemia protocol given in ED. Home medications include lisinopril, bumetanide, gabapentin, metformin, empagliflozin, metoprolol. Problems resolved:  Hyperkalemia 2/2 decreased GFR and lisinopril. Resolved. Encephalopathy, 2/2 uremia versus medication toxicity (gabapentin), hold gabapentin in view of severely decreased GFR    IMPRESSION/RECOMMENDATIONS:        RADHA, stage III likely prerenal RADHA 2/2 intravascular volume depletion from poor oral intake in the setting of ACE inhibition versus cardiorenal syndrome versus ATN. Kidney ultrasound and CT abdomen and pelvis unrevealing. Renal function has improved since admission with excellent urine output. Creatinine level continues to slowly improve. CKD stage IIIb likely 2/2 nephrosclerosis and previous episodes of ischemic ATN requiring HD x2 separate occasions.   Baseline creatinine 1.4 to 1.5 mg/dL    Hypernatremia 2/2 loop diuretics, improved with increased free water intake/decrease loop diuretics  Hypokalemia 2/2 loop diuretics, replaced  HTN, metoprolol and lisinopril on hold  HFpEF 55 to 60%, proBNP 6555> 4125, Bumex and metoprolol on hold  Normal pH with metabolic alkalosis, likely multifactorial 2/2 bicarbonate administration and contraction alkalosis, give acetazolamide 500 mg x 1 dose today  ------------------------------------------------------------------------------------    UTI, on ceftriaxone  Atrial fibrillation, on metoprolol and apixaban  Type II DM, holding metformin  Hypothyroidism on levothyroxine  NIKO  Nutrition: 2L FR    Plan:    Continue to hold Bumex  Continue fluid restriction to 2L  Continue to hold lisinopril  Continue to hold metoprolol  Continue to monitor renal function      Electronically signed by Araseli Farley MD on 12/29/2022 at 10:17 AM

## 2022-12-29 NOTE — PROGRESS NOTES
[facility]  Internal Medicine Residency Program  Progress Note - House Team 1    Patient:  Gale Buchnaan 69 y.o. male MRN: [id number]     Date of Service: 12/29/2022     CC: AMS      Subjective   Overnight events: No acute events overnight. He did request something for some itchiness on his left arm at the site of the bulla  and received dose of hydroxyzine that helped. Patient was seen and examined at bedside this morning. He has no new complaints this morning. He did state that his bulla burst overnight on his left arm. He denies any pain at the site at this time. He does still have some minor itchiness but said it is not very bad. Conducted a mini-cognition exam was conducted on the patient for his altered mental status. He was able to recall all three objects. However he was unable to draw the hands on a clock at this time. He is still alert and oriented to self and place and has trouble to recalling the year. Denies any shortness of breath, chest pain, trouble swallowing, fever, chills, constipation, diarrhea or leg swelling at this time. Objective     Physical Exam:  Vitals: BP (!) 133/59   Pulse 84   Temp 97.9 °F (36.6 °C) (Temporal)   Resp 16   Ht 5' 7\" (1.702 m)   Wt 239 lb (108.4 kg)   SpO2 95%   BMI 37.43 kg/m²     I & O - 24hr: No intake/output data recorded. General Appearance: alert and cooperative  HEENT:  Head: Normal, normocephalic, atraumatic.   Neck: no adenopathy, no JVD, supple, symmetrical, trachea midline, and thyroid not enlarged, symmetric, no tenderness/mass/nodules ;   Lung: clear to auscultation bilaterally  Heart: regular rate and rhythm, S1, S2 normal, no murmur, click, rub or gallop  Abdomen: soft, non-tender; bowel sounds normal; no masses,  no organomegaly  Extremities:   left upper extremity  has multiple minor boula along medial aspect at the antecubital fossa with surrounding erythema; right upper extremity has ecchymosis that is non-tender  Musculokeletal: No joint swelling, no muscle tenderness. ROM normal in all joints of extremities. Neurologic: Mental status: Alert, oriented to person and place with difficulty on time, thought content appropriate    Subject  Pertinent Labs & Imaging Studies   carmela  CBC with Differential:    Lab Results   Component Value Date/Time    WBC 9.4 12/29/2022 06:35 AM    RBC 4.45 12/29/2022 06:35 AM    HGB 13.1 12/29/2022 06:35 AM    HCT 41.1 12/29/2022 06:35 AM     12/29/2022 06:35 AM    MCV 92.4 12/29/2022 06:35 AM    MCH 29.4 12/29/2022 06:35 AM    MCHC 31.9 12/29/2022 06:35 AM    RDW 15.9 12/29/2022 06:35 AM    SEGSPCT 68 04/27/2011 10:30 AM    LYMPHOPCT 17.2 12/29/2022 06:35 AM    MONOPCT 7.4 12/29/2022 06:35 AM    EOSPCT 7 10/05/2010 10:52 AM    BASOPCT 1.1 12/29/2022 06:35 AM    MONOSABS 0.69 12/29/2022 06:35 AM    LYMPHSABS 1.61 12/29/2022 06:35 AM    EOSABS 0.69 12/29/2022 06:35 AM    BASOSABS 0.10 12/29/2022 06:35 AM     BMP:    Lab Results   Component Value Date/Time     12/29/2022 06:35 AM    K 3.7 12/29/2022 06:35 AM    K 6.5 12/25/2022 05:54 AM     12/29/2022 06:35 AM    CO2 25 12/29/2022 06:35 AM    BUN 47 12/29/2022 06:35 AM    LABALBU 3.4 12/29/2022 06:35 AM    LABALBU 4.4 10/25/2011 09:50 AM    CREATININE 1.6 12/29/2022 06:35 AM    CALCIUM 9.6 12/29/2022 06:35 AM    GFRAA >60 10/03/2022 10:55 AM    LABGLOM 46 12/29/2022 06:35 AM    GLUCOSE 176 12/29/2022 06:35 AM    GLUCOSE 133 04/18/2012 08:43 AM     Magnesium:    Lab Results   Component Value Date/Time    MG 1.9 12/29/2022 06:35 AM     Phosphorus:    Lab Results   Component Value Date/Time    PHOS 3.4 12/29/2022 06:35 AM       Resident's Assessment and Plan   Mr. Poppy Stratton, 69year old male, with PMH of atrial fibrillation on eliquis, hypothyroidism, NIKO, sinus node dysfunction, CKD,  type 2 diabetes, nocturnal oxygen dependent on 2.5 L presented to ED due to AMS. Patient was brought in by his wife.   He was found to have elevated potassium on presentation at 7.5. Creatinine was elevated at 5.9 (baseline creatinine around 1.3-1.5). Hyperkalemia treatment was initiated in the ED and nephrology was consulted. Patient was started on normal saline at 125 cc/h and was admitted to the general medical floors. Patient was only oriented to himself. On 12/25 patient was started on Bumex drip and transferred to the MICU for further management with concerns for possible dialysis if no improvement in BMP. Patient had poor vascular access and general surgery was consulted on 12/25 for central line insertion and right subclavian. Patient was pancultured finding for source of altered mental status. UA showed small leukocyte esterase and few bacteria was started on ceftriaxone on 12/25. Over hospital course cultures have all come back negative. Ceftriaxone was discontinued on 12/28. Renal function started to improve and Bumex drip was discontinued and patient was continued on oral Bumex. Oral Bumex was then placed on hold per nephrology due to BP and increased sodium during admission. Patient continued to be altered and only oriented to person and place. Wife was agreeable to set up referral so patient could have skilled facility at discharge. During course his Neurontin was held due to altered mental status and can be discussed at hospital follow up for when to restart this medications. Other medications that were held during admission were lisinopril, metoprolol succinate and Bumex. Blood pressure continued to be stable without medications and labs improved as well. Can discuss restarting medications in outpatient setting as tolerated. He remained stable and oxygen was able to be weaned down and only be used overnight by day of discharge. Assessment/Plan: 1. Acute encephalopathy 2/2 multifactorial, uremia vs hyperglycemia    -CT of the head negative for acute abnormalities   -Home gabapentin on hold   - on admission, currently downtrending   -Monitor mentation    2. Acute Hypoxic Respiratory Failure 2/2 CHF exacerbation    -proBNP elevated on admission, clinically hypervolemic   -On 4 L nasal cannula   -Monitor respiratory status and wean O2 as tolerated    3. HFpEF     -EF 65% with stage II diastolic dysfunction    -on GDMT - recently started on Jardiance, Entresto and Toprolol XL    -Bumex drip discontinued 12/26   -P.o. Bumex 2 mg twice daily   -Cardiology consulted-follow recommendations   -Follows with CHF clinic as outpatient    4. Atrial fibrillations with pacemaker    - On anticoagulation with Eliquis at home; continue    5. Hx Hypertension    -Stable, on metoprolol at home   -Hold home medication with low BP   -Monitor vital signs    6. Hyperlipidemia    -On statin at home; continue     7. Stage III RADHA on CKD 2/2 possible cardiorenal syndrome    -baseline Cr 1.4-1.5    -currently downtrending Cr to 1.7 today    -Nephrology consulted-follow recommendations    8. CKD stage IIIb likely 2/2 neprhosclerosis and previous episodes of ischemic ATN requiring Hdx2    -Baseline Cr 1.4 -1.58.   -Nephrology consulted-follow recommendations    9. Uremia 2/2 hypertension    -BUN down trending; on admission 103   -Today BUN 77     10. UTI    -UA showed leukocyte esterase and bacteria    -started on Ceftriaxone on 12/25 - discontinued on 12/28; all cultures negative    11. Type 2 DM   -On metformin and lantus at home; currently hold metformin while inpatient   -On medium dose sliding scale  -started on 5 units Lantus nightly on 12/28  -Monitor blood glucose AC at bedtime    12. Hx Hypothyroidism   - TSH 9.3, fT4 1.08; continue home dose synthroid 88 mcg        PT/OT evaluation: ordered  DVT prophylaxis/ GI prophylaxis: Eliquis/ diet   Disposition: continue current care     Ruben Olvera MD, PGY-1  Attending physician: Dr. Gerry Osler

## 2022-12-30 VITALS
HEART RATE: 83 BPM | HEIGHT: 67 IN | BODY MASS INDEX: 37.51 KG/M2 | DIASTOLIC BLOOD PRESSURE: 63 MMHG | TEMPERATURE: 97.7 F | WEIGHT: 239 LBS | SYSTOLIC BLOOD PRESSURE: 141 MMHG | RESPIRATION RATE: 18 BRPM | OXYGEN SATURATION: 95 %

## 2022-12-30 LAB
ALBUMIN SERPL-MCNC: 3.6 G/DL (ref 3.5–5.2)
ALP BLD-CCNC: 102 U/L (ref 40–129)
ALT SERPL-CCNC: 36 U/L (ref 0–40)
ANION GAP SERPL CALCULATED.3IONS-SCNC: 15 MMOL/L (ref 7–16)
AST SERPL-CCNC: 28 U/L (ref 0–39)
BASOPHILS ABSOLUTE: 0.1 E9/L (ref 0–0.2)
BASOPHILS RELATIVE PERCENT: 1.2 % (ref 0–2)
BILIRUB SERPL-MCNC: 0.8 MG/DL (ref 0–1.2)
BLOOD CULTURE, ROUTINE: NORMAL
BUN BLDV-MCNC: 42 MG/DL (ref 6–23)
CALCIUM IONIZED: 1.27 MMOL/L (ref 1.15–1.33)
CALCIUM SERPL-MCNC: 9.5 MG/DL (ref 8.6–10.2)
CHLORIDE BLD-SCNC: 98 MMOL/L (ref 98–107)
CO2: 23 MMOL/L (ref 22–29)
CREAT SERPL-MCNC: 1.5 MG/DL (ref 0.7–1.2)
CULTURE, BLOOD 2: NORMAL
EOSINOPHILS ABSOLUTE: 0.63 E9/L (ref 0.05–0.5)
EOSINOPHILS RELATIVE PERCENT: 7.3 % (ref 0–6)
GFR SERPL CREATININE-BSD FRML MDRD: 50 ML/MIN/1.73
GLUCOSE BLD-MCNC: 179 MG/DL (ref 74–99)
HCT VFR BLD CALC: 38 % (ref 37–54)
HEMOGLOBIN: 12.6 G/DL (ref 12.5–16.5)
IMMATURE GRANULOCYTES #: 0.33 E9/L
IMMATURE GRANULOCYTES %: 3.8 % (ref 0–5)
LYMPHOCYTES ABSOLUTE: 1.55 E9/L (ref 1.5–4)
LYMPHOCYTES RELATIVE PERCENT: 18.1 % (ref 20–42)
MAGNESIUM: 1.7 MG/DL (ref 1.6–2.6)
MCH RBC QN AUTO: 29.9 PG (ref 26–35)
MCHC RBC AUTO-ENTMCNC: 33.2 % (ref 32–34.5)
MCV RBC AUTO: 90 FL (ref 80–99.9)
METER GLUCOSE: 172 MG/DL (ref 74–99)
METER GLUCOSE: 238 MG/DL (ref 74–99)
MONOCYTES ABSOLUTE: 0.59 E9/L (ref 0.1–0.95)
MONOCYTES RELATIVE PERCENT: 6.9 % (ref 2–12)
NEUTROPHILS ABSOLUTE: 5.38 E9/L (ref 1.8–7.3)
NEUTROPHILS RELATIVE PERCENT: 62.7 % (ref 43–80)
PDW BLD-RTO: 15.5 FL (ref 11.5–15)
PHOSPHORUS: 3.4 MG/DL (ref 2.5–4.5)
PLATELET # BLD: 278 E9/L (ref 130–450)
PMV BLD AUTO: 9.4 FL (ref 7–12)
POTASSIUM SERPL-SCNC: 3.6 MMOL/L (ref 3.5–5)
RBC # BLD: 4.22 E12/L (ref 3.8–5.8)
SODIUM BLD-SCNC: 136 MMOL/L (ref 132–146)
TOTAL PROTEIN: 7.4 G/DL (ref 6.4–8.3)
WBC # BLD: 8.6 E9/L (ref 4.5–11.5)

## 2022-12-30 PROCEDURE — 83735 ASSAY OF MAGNESIUM: CPT

## 2022-12-30 PROCEDURE — 82330 ASSAY OF CALCIUM: CPT

## 2022-12-30 PROCEDURE — 94660 CPAP INITIATION&MGMT: CPT

## 2022-12-30 PROCEDURE — 2580000003 HC RX 258: Performed by: NURSE PRACTITIONER

## 2022-12-30 PROCEDURE — 6370000000 HC RX 637 (ALT 250 FOR IP): Performed by: FAMILY MEDICINE

## 2022-12-30 PROCEDURE — 82962 GLUCOSE BLOOD TEST: CPT

## 2022-12-30 PROCEDURE — 80053 COMPREHEN METABOLIC PANEL: CPT

## 2022-12-30 PROCEDURE — 84100 ASSAY OF PHOSPHORUS: CPT

## 2022-12-30 PROCEDURE — 36415 COLL VENOUS BLD VENIPUNCTURE: CPT

## 2022-12-30 PROCEDURE — 6370000000 HC RX 637 (ALT 250 FOR IP): Performed by: INTERNAL MEDICINE

## 2022-12-30 PROCEDURE — 85025 COMPLETE CBC W/AUTO DIFF WBC: CPT

## 2022-12-30 PROCEDURE — 6370000000 HC RX 637 (ALT 250 FOR IP)

## 2022-12-30 PROCEDURE — 99238 HOSP IP/OBS DSCHRG MGMT 30/<: CPT | Performed by: INTERNAL MEDICINE

## 2022-12-30 RX ORDER — POTASSIUM CHLORIDE 20 MEQ/1
20 TABLET, EXTENDED RELEASE ORAL 2 TIMES DAILY
Qty: 2 TABLET | Refills: 0 | Status: ON HOLD | DISCHARGE
Start: 2022-12-30 | End: 2023-01-08

## 2022-12-30 RX ORDER — LANOLIN ALCOHOL/MO/W.PET/CERES
400 CREAM (GRAM) TOPICAL DAILY
Status: DISCONTINUED | OUTPATIENT
Start: 2022-12-30 | End: 2022-12-30 | Stop reason: HOSPADM

## 2022-12-30 RX ORDER — CALCIUM CARBONATE/VITAMIN D3 500-10/5ML
400 LIQUID (ML) ORAL DAILY
Qty: 30 CAPSULE | Refills: 0 | Status: ON HOLD | DISCHARGE
Start: 2022-12-30 | End: 2023-01-08

## 2022-12-30 RX ORDER — BUMETANIDE 2 MG/1
2 TABLET ORAL 2 TIMES DAILY
Qty: 30 TABLET | Refills: 3 | Status: ON HOLD | OUTPATIENT
Start: 2022-12-30

## 2022-12-30 RX ORDER — METOPROLOL SUCCINATE 25 MG/1
25 TABLET, EXTENDED RELEASE ORAL DAILY
Qty: 30 TABLET | Refills: 3 | Status: ON HOLD | OUTPATIENT
Start: 2022-12-30

## 2022-12-30 RX ORDER — BUMETANIDE 1 MG/1
2 TABLET ORAL 2 TIMES DAILY
Status: DISCONTINUED | OUTPATIENT
Start: 2022-12-30 | End: 2022-12-30 | Stop reason: HOSPADM

## 2022-12-30 RX ORDER — POTASSIUM CHLORIDE 20 MEQ/1
20 TABLET, EXTENDED RELEASE ORAL 2 TIMES DAILY WITH MEALS
Status: DISCONTINUED | OUTPATIENT
Start: 2022-12-30 | End: 2022-12-30 | Stop reason: HOSPADM

## 2022-12-30 RX ADMIN — POTASSIUM CHLORIDE 20 MEQ: 1500 TABLET, EXTENDED RELEASE ORAL at 10:22

## 2022-12-30 RX ADMIN — ACETAMINOPHEN 650 MG: 325 TABLET ORAL at 02:38

## 2022-12-30 RX ADMIN — Medication 400 MG: at 10:22

## 2022-12-30 RX ADMIN — Medication 2000 UNITS: at 08:21

## 2022-12-30 RX ADMIN — ATORVASTATIN CALCIUM 40 MG: 40 TABLET, FILM COATED ORAL at 08:21

## 2022-12-30 RX ADMIN — INSULIN LISPRO 2 UNITS: 100 INJECTION, SOLUTION INTRAVENOUS; SUBCUTANEOUS at 10:26

## 2022-12-30 RX ADMIN — APIXABAN 5 MG: 5 TABLET, FILM COATED ORAL at 08:21

## 2022-12-30 RX ADMIN — BUMETANIDE 2 MG: 1 TABLET ORAL at 10:22

## 2022-12-30 RX ADMIN — LEVOTHYROXINE SODIUM 88 MCG: 0.09 TABLET ORAL at 06:11

## 2022-12-30 RX ADMIN — SODIUM CHLORIDE, PRESERVATIVE FREE 10 ML: 5 INJECTION INTRAVENOUS at 08:21

## 2022-12-30 RX ADMIN — FERROUS SULFATE TAB 325 MG (65 MG ELEMENTAL FE) 325 MG: 325 (65 FE) TAB at 08:21

## 2022-12-30 RX ADMIN — HYDROXYZINE HYDROCHLORIDE 10 MG: 10 TABLET ORAL at 06:12

## 2022-12-30 RX ADMIN — ASPIRIN 81 MG: 81 TABLET, COATED ORAL at 08:21

## 2022-12-30 ASSESSMENT — ENCOUNTER SYMPTOMS
COUGH: 0
TROUBLE SWALLOWING: 0
SORE THROAT: 0
SHORTNESS OF BREATH: 0

## 2022-12-30 ASSESSMENT — PAIN DESCRIPTION - LOCATION
LOCATION: BACK
LOCATION: BACK

## 2022-12-30 ASSESSMENT — PAIN SCALES - GENERAL
PAINLEVEL_OUTOF10: 5
PAINLEVEL_OUTOF10: 5

## 2022-12-30 ASSESSMENT — PAIN DESCRIPTION - DESCRIPTORS
DESCRIPTORS: ACHING;DULL
DESCRIPTORS: DULL;ACHING

## 2022-12-30 ASSESSMENT — PAIN DESCRIPTION - ORIENTATION
ORIENTATION: LOWER
ORIENTATION: LOWER

## 2022-12-30 NOTE — PLAN OF CARE
Problem: Chronic Conditions and Co-morbidities  Goal: Patient's chronic conditions and co-morbidity symptoms are monitored and maintained or improved  12/30/2022 1235 by Batsheva Riley RN  Outcome: Adequate for Discharge  12/30/2022 0900 by Batsheva Riley RN  Outcome: Progressing     Problem: Discharge Planning  Goal: Discharge to home or other facility with appropriate resources  12/30/2022 1235 by Batsheva Riley RN  Outcome: Adequate for Discharge  12/30/2022 0900 by Batsheva Riley RN  Outcome: Progressing     Problem: Safety - Adult  Goal: Free from fall injury  12/30/2022 1235 by Batsheva Riley RN  Outcome: Adequate for Discharge  Flowsheets (Taken 12/30/2022 0915)  Free From Fall Injury: Instruct family/caregiver on patient safety  12/30/2022 0900 by Batsheva Riley RN  Outcome: Progressing     Problem: Pain  Goal: Verbalizes/displays adequate comfort level or baseline comfort level  12/30/2022 1235 by Batsheva Riley RN  Outcome: Adequate for Discharge  12/30/2022 0900 by Batsheva Riley RN  Outcome: Progressing     Problem: Skin/Tissue Integrity  Goal: Absence of new skin breakdown  Description: 1. Monitor for areas of redness and/or skin breakdown  2. Assess vascular access sites hourly  3. Every 4-6 hours minimum:  Change oxygen saturation probe site  4. Every 4-6 hours:  If on nasal continuous positive airway pressure, respiratory therapy assess nares and determine need for appliance change or resting period. 12/30/2022 1235 by Batsheva Riley RN  Outcome: Adequate for Discharge  12/30/2022 0900 by Batsheva Riley RN  Outcome: Progressing     Problem: Safety - Medical Restraint  Goal: Remains free of injury from restraints (Restraint for Interference with Medical Device)  Description: INTERVENTIONS:  1. Determine that other, less restrictive measures have been tried or would not be effective before applying the restraint  2. Evaluate the patient's condition at the time of restraint application  3.  Inform patient/family regarding the reason for restraint  4.  Q2H: Monitor safety, psychosocial status, comfort, nutrition and hydration  12/30/2022 1235 by Grace Sierra RN  Outcome: Adequate for Discharge  12/30/2022 0900 by Grace Sierra RN  Outcome: Progressing     Problem: ABCDS Injury Assessment  Goal: Absence of physical injury  12/30/2022 1235 by Grace Sierra RN  Outcome: Adequate for Discharge  Flowsheets (Taken 12/30/2022 0915)  Absence of Physical Injury: Implement safety measures based on patient assessment  12/30/2022 0900 by Grace Sierra RN  Outcome: Progressing

## 2022-12-30 NOTE — DISCHARGE INSTRUCTIONS
Internal medicine    Follow ups  Please follow up with the internal medicine clinic at [facility] within 14 days of discharge from your rehabilitation facility   Please keep all other follow up appointments:  Future Appointments   Date Time Provider Stacie Mtz   1/4/2023 10:00 AM [facility] CHF ROOM 1 [facility]F [place]   1/23/2023 12:30 PM Александр Cifuentes MD [facility]   2/2/2023  9:00 AM Bandar Storey MD [facility]        Changes in healthcare   Please take all medications as indicated  Diet: regular diet   Activity: activity as tolerated  Medications you should start taking after discharge:  Bumex 2 mg, 1 tablet, twice daily  Metoprolol 25 mg once daily  Magnesium oxide 400 mg daily  Potassium chloride 20 mEq tablet, 1 tablet, twice daily for 2 doses only  Medications you should stop taking and can discuss restarting as outpatient with PCP  Neurontin (gabapentin)   Lisinopril   Please have blood work done (BMP) 1 week after discharge   Please contact us if you have any concerns, wish to change or make an appointment:  Internal medicine clinic   Phone: [phone]  Fax: [phone]  [address]  Should you have further questions in regards to this visit, you can review your clinical note and after visit summary document on your [employer] account. Other than any new prescriptions given to you today, the list of home medications on this After Visit Summary are based on information provided to us from you and your healthcare providers. This information, including the list, dose, and frequency of medications is only as accurate as the information you provided. If you have any questions or concerns about your home medications, please contact your Primary Care Physician for further clarification.

## 2022-12-30 NOTE — PROGRESS NOTES
[address] [facility]  Internal Medicine Residency / [address]    Attending Physician Statement  I have discussed the case, including pertinent history and exam findings with the resident and the team.  I have seen and examined the patient and the key elements of the encounter have been performed by me. I agree with the assessment, plan and orders as documented by the resident. 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       Devora Salcido MD [facility] SPECIALTY [facility]  Internal Medicine Residency Faculty

## 2022-12-30 NOTE — CARE COORDINATION
12/30/22 Update CM Note: Per insurance precert has not been started on patient to discharge to [facility] skilled due to [employer] per [place] at [facility]. Will follow for ability to discharge. IM team 1 notified of bed availability for today.  Electronically signed by Christian Sanchez RN CM on 12/30/2022 at 10:08 AM

## 2022-12-30 NOTE — CARE COORDINATION
12/30/22 Update CM Note: [facility] ambulance set up to pick patient up at 4:00pm via stretcher for discharge to [facility]. He does not need a covid. Medical Resident notified of need for discharge order. Spoke with patients wife and informed her of the time for the pickup. Bedside nurse also notified. PASSAR/jesus/destination completed. Envelope completed and placed in soft chart.  Electronically signed by Lisa Young RN CM on 12/30/2022 at 11:28 AM

## 2022-12-30 NOTE — DISCHARGE SUMMARY
[address]  Discharge Summary    PCP: Johnnie Lamas MD    Admit Date:12/24/2022  Discharge Date: 12/30/2022    Chief Complaint   Patient presents with    Fatigue     Generalized weakness    Altered Mental Status     Since yesterday         Admission Diagnosis:   Acute encephalopathy 2/2 multifactorial, uremia vs hyperglycemia - improving  Acute Hypoxic Respiratory Failure 2/2 CHF exacerbation  - improved   Stage III RADHA on CKD 2/2 possible cardiorenal syndrome - improved  Uremia 2/2 hypertension - improving  UTI - improved    HFpEF   Atrial fibrillations with pacemaker   Hx Hypertension   CKD stage IIIb likely 2/2 neprhosclerosis and previous episodes of ischemic ATN requiring Hdx2   Type 2 DM   Hx Hypothyroidism     Discharge Diagnosis:  Acute encephalopathy 2/2 multifactorial, uremia vs hyperglycemia - improving  Acute Hypoxic Respiratory Failure 2/2 CHF exacerbation  - improved   Stage III RADHA on CKD 2/2 possible cardiorenal syndrome - improved  Uremia 2/2 hypertension - improving  UTI - improved    HFpEF   Atrial fibrillations with pacemaker   Hx Hypertension   CKD stage IIIb likely 2/2 neprhosclerosis and previous episodes of ischemic ATN requiring Hdx2   Type 2 DM   Hx Hypothyroidism       Hospital Course: Mr. Rizwan Velasquez, 69year old male, with PMH of atrial fibrillation on eliquis, hypothyroidism, NIKO, sinus node dysfunction, CKD,  type 2 diabetes, nocturnal oxygen dependent on 2.5 L presented to ED due to AMS. Patient was brought in by his wife. He was found to have elevated potassium on presentation at 7.5. Creatinine was elevated at 5.9 (baseline creatinine around 1.3-1.5). Hyperkalemia treatment was initiated in the ED and nephrology was consulted. Patient was started on normal saline at 125 cc/h and was admitted to the general medical floors. Patient was only oriented to himself.   On 12/25 patient was started on Bumex drip and transferred to the MICU for further management with concerns for possible dialysis if no improvement in BMP.  Patient had poor vascular access and general surgery was consulted on 12/25 for central line insertion and right subclavian.  Patient was pancultured finding for source of altered mental status.  UA showed small leukocyte esterase and few bacteria was started on ceftriaxone on 12/25.     Over hospital course cultures have all come back negative.  Ceftriaxone was discontinued on 12/28.  Renal function started to improve and Bumex drip was discontinued and patient was continued on oral Bumex.  Oral Bumex was then placed on hold per nephrology due to BP and increased sodium during admission. Patient continued to be altered and only oriented to person and place.  Wife was agreeable to set up referral so patient could have skilled facility at discharge. AM-PAC score was 16/24. Prince has been in place during stay and will remain at discharge to facility.     During course his Neurontin was held due to altered mental status and can be discussed at hospital follow up for when to restart this medications. Other medications that were held during admission were lisinopril, metoprolol succinate and Bumex. Blood pressure continued to be stable without medications and labs improved as well. Can discuss restarting medications in outpatient setting as tolerated.     Prior to discharge a mini-cognition exam was conducted on the patient for his altered mental status. He was able to recall all three objects. However he was unable to draw the hands on a clock at this time. He is still alert and oriented to self and place and has trouble to recalling the year. Patient had bulla on left arm and he denied any pain just some minor itchiness around the sites. He remained stable and oxygen was able to be weaned down and patient is on room air on maintaining adequate saturations.      On day of discharge :   Subjective:  Patient was seen and examined at  bedside this morning. He was resting on room air in no acute distress. Patient has no complaints at this time and states he feels well and wants to know when he can leave. Review of Systems   Constitutional:  Negative for appetite change, chills, fatigue and fever. HENT:  Negative for sore throat and trouble swallowing. Respiratory:  Negative for cough and shortness of breath. Cardiovascular:  Negative for chest pain. Genitourinary:  Negative for dysuria. Musculoskeletal:  Negative for arthralgias and myalgias. Skin:  Positive for wound. Right arm bruise and left arm reddish-purple blisters    Neurological:  Negative for dizziness, light-headedness and headaches. Follow up in Clinic:   IM clinic after discharge from facility    Significant findings (history and exam, laboratory, radiological, pathology, other tests):   General Appearance: alert and cooperative  HEENT:  Head: Normal, normocephalic, atraumatic. Neck: no adenopathy, no JVD, supple, symmetrical, trachea midline, and thyroid not enlarged, symmetric, no tenderness/mass/nodules  Lung: clear to auscultation bilaterally  Heart: regular rate and rhythm, S1, S2 normal, no murmur, click, rub or gallop  Abdomen: soft, non-tender; bowel sounds normal; no masses,  no organomegaly  Extremities:   left upper extremity has multiple dark red/purple bulla along medial aspect at the antecubital fossa that is non-tender; healing ecchymosis on right medial upper extremity that is non-tender   Musculokeletal: No joint swelling, no muscle tenderness. ROM normal in all joints of extremities.    Neurologic: Mental status: Alert, oriented, thought content appropriate      Pending test results: none    Consults:  Nephrology       Procedures:  none    Condition at discharge: Stable    Disposition: Acute rehab facility     Time taken for discharge : < 30 mins [] >30 mins [x]    Discharge Medications:  Current Discharge Medication List CONTINUE these medications which have NOT CHANGED    Details   insulin glargine (BASAGLAR KWIKPEN) 100 UNIT/ML injection pen Inject 55 Units into the skin nightly PAP medication. Qty: 17 Adjustable Dose Pre-filled Pen Syringe, Refills: 3    Associated Diagnoses: Type 2 diabetes mellitus with diabetic autonomic neuropathy, without long-term current use of insulin ([facility])      bumetanide (BUMEX) 2 MG tablet Take 1 tablet by mouth daily  Qty: 30 tablet, Refills: 3    Associated Diagnoses: Acute on chronic heart failure with preserved ejection fraction ([facility])      gabapentin (NEURONTIN) 300 MG capsule Take 2 capsules by mouth in the morning and at bedtime for 90 days. Qty: 360 capsule, Refills: 0    Associated Diagnoses: Diabetic polyneuropathy associated with type 2 diabetes mellitus ([facility])      mometasone-formoterol (DULERA) 200-5 MCG/ACT inhaler Inhale 2 puffs into the lungs 2 times daily Rinse mouth after using. PAP medication. Qty: 3 each, Refills: 2    Associated Diagnoses: Restrictive lung disease secondary to obesity      !! Insulin Pen Needle (PEN NEEDLES) 31G X 6 MM MISC Once daily. May substitute brand for insurance coverage. Qty: 100 each, Refills: 3    Associated Diagnoses: Type 2 diabetes mellitus with diabetic autonomic neuropathy, with long-term current use of insulin ([facility] [address]      Lancets MISC Give Delica lancets. Tests twice a day A.M.  And P.M  Qty: 200 each, Refills: 1    Associated Diagnoses: Type 2 diabetes mellitus with diabetic autonomic neuropathy, with long-term current use of insulin ([facility])      blood glucose test strips (ONETOUCH VERIO) strip TEST IN THE MORNING AND IN THE EVENING  Qty: 200 each, Refills: 1    Associated Diagnoses: Type 2 diabetes mellitus with diabetic autonomic neuropathy, with long-term current use of insulin ([facility])      lisinopril (PRINIVIL;ZESTRIL) 5 MG tablet Take 1 tablet by mouth daily  Qty: 90 tablet, Refills: 0    Associated Diagnoses: Essential hypertension; Acute on chronic heart failure with preserved ejection fraction ([facility])      insulin lispro, 1 Unit Dial, (HUMALOG KWIKPEN) 100 UNIT/ML SOPN Inject 20 Units into the skin 3 times daily (before meals) for 225 doses  Qty: 27 mL, Refills: 1    Comments: Given 3 boxes containing 5 x 3 mL pen each  Associated Diagnoses: Type 2 diabetes mellitus with diabetic autonomic neuropathy, with long-term current use of insulin ([facility])      ferrous sulfate (IRON 325) 325 (65 Fe) MG tablet Take 325 mg by mouth daily (with breakfast)      oxyCODONE-acetaminophen (PERCOCET) 7.5-325 MG per tablet Take 1 tablet by mouth 2 times daily as needed. allopurinol (ZYLOPRIM) 100 MG tablet Take 100 mg by mouth daily      Cholecalciferol (VITAMIN D) 50 MCG (2000 UT) CAPS capsule Take 2,000 Units by mouth daily      metFORMIN (GLUCOPHAGE) 500 MG tablet Take 1 tablet by mouth 2 times daily (with meals)  Qty: 60 tablet, Refills: 5    Associated Diagnoses: Type 2 diabetes mellitus with diabetic autonomic neuropathy, with long-term current use of insulin ([facility])      aspirin (ASPIR-LOW) 81 MG EC tablet TAKE ONE TABLET BY MOUTH EVERY DAY  Qty: 90 tablet, Refills: 1    Associated Diagnoses: Essential hypertension      zoster recombinant adjuvanted vaccine (SHINGRIX) 50 MCG/0.5ML SUSR injection 1 dose now and repeat in 2-6 months  Qty: 0.5 mL, Refills: 0    Associated Diagnoses: Need for shingles vaccine      ELIQUIS 5 MG TABS tablet Take 1 tablet by mouth in the morning and 1 tablet before bedtime. Qty: 180 tablet, Refills: 1    Associated Diagnoses: Persistent atrial fibrillation ([facility])      atorvastatin (LIPITOR) 40 MG tablet TAKE ONE TABLET BY MOUTH DAILY  Qty: 90 tablet, Refills: 1    Associated Diagnoses: Type 2 diabetes mellitus with diabetic autonomic neuropathy, with long-term current use of insulin ([facility])      metoprolol succinate (TOPROL XL) 25 MG extended release tablet Take 1 tablet by mouth in the morning.   Qty: 90 tablet, Refills: 1    Associated Diagnoses: Chronic heart failure with preserved ejection fraction ([address] Essential hypertension      levothyroxine (SYNTHROID) 88 MCG tablet Take 1 tablet by mouth in the morning. Qty: 90 tablet, Refills: 1    Comments: Requesting 1 year supply  Associated Diagnoses: Acquired hypothyroidism      empagliflozin (JARDIANCE) 25 MG tablet Take 1 tablet by mouth daily  Qty: 90 tablet, Refills: 3    Associated Diagnoses: Type 2 diabetes mellitus with diabetic autonomic neuropathy, with long-term current use of insulin ([address]      ! ! Insulin Pen Needle (PEN NEEDLES) 32G X 6 MM MISC Take insulin daily  Qty: 100 each, Refills: 1    Associated Diagnoses: Type 2 diabetes mellitus with hyperglycemia, with long-term current use of insulin ([address]      Misc. Devices MISC Please add portability to current O2 order. Resp to evaluate patient for OCD device. Qty: 1 each, Refills: 0    Associated Diagnoses: Chronic obstructive pulmonary disease, unspecified COPD type ([address]      Misc. Devices KIT Dispense 1 blood pressure cuff. Qty: 1 kit, Refills: 0    Associated Diagnoses: Essential hypertension      OXYGEN Inhale 2.5 L into the lungs nightly  Qty: 1 Device, Refills: 99      acetaminophen (TYLENOL) 500 MG tablet Take 1,000 mg by mouth daily as needed for Pain       !! - Potential duplicate medications found. Please discuss with provider.           Activity: activity as tolerated  Diet: regular diet    Follow-up appointments:    clinic after discharge from facility     Patient Instructions: Internal medicine    Follow ups  Please follow up with the internal medicine clinic at [facility] within 14 days of discharge from your rehabilitation facility   Please keep all other follow up appointments:  Future Appointments   Date Time Provider Stacie Mtz   1/4/2023 10:00 AM [facility] ROOM 1 SEY[facility]   1/23/2023 12:30 PM Stefan Amos MD [facility]   2/2/2023  9:00 AM Latrice Dudley MD [facility] Changes in healthcare   Please take all medications as indicated  Diet: regular diet   Activity: activity as tolerated  Medications you should stop taking and can discuss restarting as outpatient with PCP  Neurontin (gabapentin)   Bumex - diuretic   Lisinopril   Metoprolol succinate   Please contact us if you have any concerns, wish to change or make an appointment:  Internal medicine clinic   Phone: [phone]  Fax: [phone]  [address] [place]  Should you have further questions in regards to this visit, you can review your clinical note and after visit summary document on your [employer] account. Other than any new prescriptions given to you today, the list of home medications on this After Visit Summary are based on information provided to us from you and your healthcare providers. This information, including the list, dose, and frequency of medications is only as accurate as the information you provided. If you have any questions or concerns about your home medications, please contact your Primary Care Physician for further clarification.      Gisselle Jaramillo MD  PGY 1   11:28 AM 12/30/2022

## 2022-12-30 NOTE — CARE COORDINATION
12/30/22 Update CM Note: Patient remains on telemetry . His creatinine is 1.5 today. His bumex/lisinopril and metoprolol remain on hold. He continues with fld restriction per renal. He has an indwelling vallecillo. Strict I/0. He is on room air with sat 95%. Will await pcp/renal to round and follow up on readiness to discharge to [facility] where bed is available.  Electronically signed by Felisa Velazco RN CM on 12/30/2022 at 10:11 AM

## 2022-12-30 NOTE — PLAN OF CARE
Problem: Chronic Conditions and Co-morbidities  Goal: Patient's chronic conditions and co-morbidity symptoms are monitored and maintained or improved  Outcome: Progressing     Problem: Discharge Planning  Goal: Discharge to home or other facility with appropriate resources  Outcome: Progressing     Problem: Safety - Adult  Goal: Free from fall injury  Outcome: Progressing     Problem: Pain  Goal: Verbalizes/displays adequate comfort level or baseline comfort level  Outcome: Progressing     Problem: Skin/Tissue Integrity  Goal: Absence of new skin breakdown  Description: 1. Monitor for areas of redness and/or skin breakdown  2. Assess vascular access sites hourly  3. Every 4-6 hours minimum:  Change oxygen saturation probe site  4. Every 4-6 hours:  If on nasal continuous positive airway pressure, respiratory therapy assess nares and determine need for appliance change or resting period. Outcome: Progressing     Problem: Safety - Medical Restraint  Goal: Remains free of injury from restraints (Restraint for Interference with Medical Device)  Description: INTERVENTIONS:  1. Determine that other, less restrictive measures have been tried or would not be effective before applying the restraint  2. Evaluate the patient's condition at the time of restraint application  3. Inform patient/family regarding the reason for restraint  4.  Q2H: Monitor safety, psychosocial status, comfort, nutrition and hydration  Outcome: Progressing     Problem: ABCDS Injury Assessment  Goal: Absence of physical injury  Outcome: Progressing

## 2022-12-30 NOTE — PROGRESS NOTES
Department of Internal Medicine  Nephrology Consult Note    Events reviewed. SUBJECTIVE: We are following Mr. Shobha Omer for RADHA on CKD. Reports no complaints.     PHYSICAL EXAM:      Vitals:    VITALS:  BP (!) 141/63   Pulse 83   Temp 97.7 °F (36.5 °C) (Temporal)   Resp 18   Ht 5' 7\" (1.702 m)   Wt 239 lb (108.4 kg)   SpO2 95%   BMI 37.43 kg/m²   24HR INTAKE/OUTPUT:    Intake/Output Summary (Last 24 hours) at 12/30/2022 8065  Last data filed at 12/30/2022 0915  Gross per 24 hour   Intake 840 ml   Output 1500 ml   Net -660 ml         Constitutional: Awake, NAD  HEENT: Atraumatic, normocephalic, PERRLA  Respiratory: CTA  Cardiovascular/Edema: RRR, normal S1, normal S2, trace edema  Gastrointestinal: Soft, nondistended, nontender  Neurologic: Focal  Skin:, Dry, no rashes, no lesions, blisters to BOAZ    Scheduled Meds:   insulin glargine  5 Units SubCUTAneous Nightly    [Held by provider] bumetanide  2 mg Oral BID    lidocaine PF  5 mL IntraDERmal Once    sodium chloride flush  5-40 mL IntraVENous 2 times per day    heparin flush  3 mL IntraVENous 2 times per day    [Held by provider] allopurinol  100 mg Oral Daily    aspirin  81 mg Oral Daily    atorvastatin  40 mg Oral Daily    vitamin D  2,000 Units Oral Daily    apixaban  5 mg Oral BID    ferrous sulfate  325 mg Oral Daily with breakfast    levothyroxine  88 mcg Oral Daily    [Held by provider] lisinopril  5 mg Oral Daily    [Held by provider] metoprolol succinate  25 mg Oral Daily    [Held by provider] gabapentin  600 mg Oral Daily    insulin lispro  0-8 Units SubCUTAneous TID     insulin lispro  0-4 Units SubCUTAneous Nightly     Continuous Infusions:   sodium chloride      dextrose       PRN Meds:.hydrOXYzine HCl, sodium chloride flush, sodium chloride, heparin flush, [Held by provider] oxyCODONE-acetaminophen **AND** [Held by provider] oxyCODONE, glucose, dextrose bolus **OR** dextrose bolus, glucagon (rDNA), dextrose, albuterol, [DISCONTINUED] dextrose bolus **OR** dextrose bolus, ondansetron **OR** ondansetron, polyethylene glycol, acetaminophen **OR** acetaminophen      DATA:    CBC with Differential:    Lab Results   Component Value Date/Time    WBC 8.6 12/30/2022 06:44 AM    RBC 4.22 12/30/2022 06:44 AM    HGB 12.6 12/30/2022 06:44 AM    HCT 38.0 12/30/2022 06:44 AM     12/30/2022 06:44 AM    MCV 90.0 12/30/2022 06:44 AM    MCH 29.9 12/30/2022 06:44 AM    MCHC 33.2 12/30/2022 06:44 AM    RDW 15.5 12/30/2022 06:44 AM    SEGSPCT 68 04/27/2011 10:30 AM    LYMPHOPCT 18.1 12/30/2022 06:44 AM    MONOPCT 6.9 12/30/2022 06:44 AM    EOSPCT 7 10/05/2010 10:52 AM    BASOPCT 1.2 12/30/2022 06:44 AM    MONOSABS 0.59 12/30/2022 06:44 AM    LYMPHSABS 1.55 12/30/2022 06:44 AM    EOSABS 0.63 12/30/2022 06:44 AM    BASOSABS 0.10 12/30/2022 06:44 AM     CMP:    Lab Results   Component Value Date/Time     12/30/2022 06:44 AM    K 3.6 12/30/2022 06:44 AM    K 6.5 12/25/2022 05:54 AM    CL 98 12/30/2022 06:44 AM    CO2 23 12/30/2022 06:44 AM    BUN 42 12/30/2022 06:44 AM    CREATININE 1.5 12/30/2022 06:44 AM    GFRAA >60 10/03/2022 10:55 AM    LABGLOM 50 12/30/2022 06:44 AM    GLUCOSE 179 12/30/2022 06:44 AM    GLUCOSE 133 04/18/2012 08:43 AM    PROT 7.4 12/30/2022 06:44 AM    LABALBU 3.6 12/30/2022 06:44 AM    LABALBU 4.4 10/25/2011 09:50 AM    CALCIUM 9.5 12/30/2022 06:44 AM    BILITOT 0.8 12/30/2022 06:44 AM    ALKPHOS 102 12/30/2022 06:44 AM    AST 28 12/30/2022 06:44 AM    ALT 36 12/30/2022 06:44 AM     Magnesium:    Lab Results   Component Value Date/Time    MG 1.7 12/30/2022 06:44 AM     Phosphorus:    Lab Results   Component Value Date/Time    PHOS 3.4 12/30/2022 06:44 AM     Radiology Review:      Kidney ultrasound December 25, 2022   1. Bilateral renal cortical thinning. There is no hydronephrosis on either   side. 2.  A Prince catheter is decompressing the bladder.              CT abdomen and pelvis 12/24/2022   Suspect mild acute pancreatitis. Mild splenomegaly. Bilateral lower lobe atelectasis. CXR 12/24/2022   No acute process. BRIEF SUMMARY OF INITIAL CONSULT:    Briefly, Sabrina Hart is a 64-year-old male known to us, past medical history CKD stage 3B probably 2/2 nephrosclerosis and previous episode of ischemic ATN requiring temporary HD in May 2017, recurrent episode of ischemic ATN in March 2022 also requiring HD x2 with fair recovery of renal function, baseline creatinine 1.4 to 1.5 mg/dL, HTN, DM type II, A. fib on apixaban, HFpEF 55 to 60%, NIKO, pacemaker placement, hypothyroidism, hyperlipidemia, who presented to the ED on 12/24/2022 via EMS from home for altered mental status and decreased appetite. ED work-up found patient hypoxic, initial lab work revealed potassium level 7.5, creatinine 5.9 with , reasons for this consultation. Hyperkalemia protocol given in ED. Home medications include lisinopril, bumetanide, gabapentin, metformin, empagliflozin, metoprolol. Problems resolved:  Hyperkalemia 2/2 decreased GFR and lisinopril. Resolved. Encephalopathy, 2/2 uremia versus medication toxicity (gabapentin), hold gabapentin in view of severely decreased GFR  Hypernatremia 2/2 loop diuretics, improved with increased free water intake/decrease loop diuretics  UTI, on ceftriaxone    IMPRESSION/RECOMMENDATIONS:        RADHA, stage III likely prerenal RADHA 2/2 intravascular volume depletion from poor oral intake in the setting of ACE inhibition versus cardiorenal syndrome versus ATN. Kidney ultrasound and CT abdomen and pelvis unrevealing. Renal function has improved since admission with excellent urine output. Creatinine level down to 1.5 mg/dL , baseline. CKD stage IIIb likely 2/2 nephrosclerosis and previous episodes of ischemic ATN requiring HD x2 separate occasions.   Baseline creatinine 1.4 to 1.5 mg/dL    Hypokalemia 2/2 loop diuretics, replaced  HTN, metoprolol and lisinopril on hold  HFpEF 55 to 60%, proBNP 6555> 4125, Bumex and metoprolol on hold  Normal pH with metabolic alkalosis, likely multifactorial 2/2 bicarbonate administration and contraction alkalosis, give acetazolamide 500 mg x 1 dose today  ------------------------------------------------------------------------------------    Atrial fibrillation, on apixaban, metoprolol on hold  Type II DM, holding metformin  Hypothyroidism on levothyroxine  NIKO  Nutrition: 2L FR    Plan:    Restart Bumex 2 mg p.o. twice daily  Magnesium oxide 400 g p.o. daily  KCl 20 mEq p.o. twice daily x2 doses  Fluid restriction 1.2 L  Restart metoprolol succinate 25 mg p.o. daily  Do not restart lisinopril  Continue to monitor renal function  Okay to discharge from renal point of view  Follow-up with our office in 1 to 2 weeks  BMP next week      Electronically signed by Manolo Delgado MD on 12/30/2022 at 9:29 AM

## 2023-01-03 ENCOUNTER — TELEPHONE (OUTPATIENT)
Dept: INTERNAL MEDICINE | Age: 68
End: 2023-01-03

## 2023-01-03 ENCOUNTER — TELEPHONE (OUTPATIENT)
Dept: OTHER | Age: 68
End: 2023-01-03

## 2023-01-03 NOTE — TELEPHONE ENCOUNTER
Called and spoke with Merly from [facility]. States pt was discharged to their facility. Requested office be called when patient is ready for discharge as patient will need a follow up appt scheduled in our office. Phone Number given to call. States she will pass this information along to the .

## 2023-01-04 ENCOUNTER — HOSPITAL ENCOUNTER (OUTPATIENT)
Dept: OTHER | Age: 68
Setting detail: THERAPIES SERIES
Discharge: HOME | End: 2023-01-04

## 2023-01-06 NOTE — PROGRESS NOTES
Physician Progress Note      PATIENT:               Werner Castillo  CSN #:                  [id number]  :                       1955  ADMIT DATE:       2022 5:19 PM  [address] [place] DATE:        2022 6:12 PM  RESPONDING  PROVIDER #:        Miguelito Godfrey MD          QUERY TEXT:    Pt admitted with AMS, RADHA, UTI, Encephalopathy and Acute hypoxic respiratory   failure 2/2 CHF exacerbation. On admission WBC 12.6, temp 96.5, procalcitonin   0.34, and Lactic acid 2.4. If possible, please document in the progress notes   and discharge summary if you are evaluating and /or treating any of the   following: The medical record reflects the following:  Risk Factors: UTI, RADHA, Encephalopathy, respiratory failure, HTN, CHF, CKD, DM  Clinical Indicators:  nephrology consult assessment \"Respiratory acidosis   with high anion gap metabolic acidosis, pH 1.246, PCO2 55.1, HCO3 21.6,   likely 2/2 uremia\", \"Encephalopathy, 2/2 uremia versus medication toxicity   (gabapentin)\".  Critical care consult assessment \"Poor appetite, most   likely 2/2 uremia\", \"HAGMA, most likely 2/2 uremia and lactic acidosis\", \"UTI,   complicated\", \"UA with small leukocytes esterase, few bacteria\",  IM   assessment \"Altered mental status 2/2 multifactorial, uremia versus   hyperglycemia\", \"Uremia 2/2Hypertension\". Treatment: ceftriaxone, NS bolus, NS 100ml/hr, Critical care consultation. Options provided:  -- Sepsis, present on admission  -- UTI without Sepsis  -- Other - I will add my own diagnosis  -- Disagree - Not applicable / Not valid  -- Disagree - Clinically unable to determine / Unknown  -- Refer to Clinical Documentation Reviewer    PROVIDER RESPONSE TEXT:    This patient has sepsis which was present on admission. Query created by:  Suhail Davis on 2022 9:25 AM      Electronically signed by:  Miguelito Godfrey MD 2023 4:57 PM

## 2023-01-07 ENCOUNTER — HOSPITAL ENCOUNTER (INPATIENT)
Age: 68
LOS: 4 days | Discharge: HOME | DRG: 682 | End: 2023-01-11
Attending: EMERGENCY MEDICINE | Admitting: STUDENT IN AN ORGANIZED HEALTH CARE EDUCATION/TRAINING PROGRAM
Payer: MEDICARE

## 2023-01-07 ENCOUNTER — APPOINTMENT (OUTPATIENT)
Dept: GENERAL RADIOLOGY | Age: 68
DRG: 682 | End: 2023-01-07
Payer: MEDICARE

## 2023-01-07 ENCOUNTER — APPOINTMENT (OUTPATIENT)
Dept: CT IMAGING | Age: 68
DRG: 682 | End: 2023-01-07
Payer: MEDICARE

## 2023-01-07 DIAGNOSIS — N17.9 AKI (ACUTE KIDNEY INJURY) (HCC): ICD-10-CM

## 2023-01-07 DIAGNOSIS — I95.9 HYPOTENSION, UNSPECIFIED HYPOTENSION TYPE: Primary | ICD-10-CM

## 2023-01-07 PROBLEM — E86.1 HYPOVOLEMIA: Status: ACTIVE | Noted: 2023-01-07

## 2023-01-07 PROBLEM — R57.1 HYPOVOLEMIC SHOCK (HCC): Status: ACTIVE | Noted: 2023-01-07

## 2023-01-07 LAB
ALBUMIN SERPL-MCNC: 3.5 G/DL (ref 3.5–5.2)
ALP BLD-CCNC: 118 U/L (ref 40–129)
ALT SERPL-CCNC: 34 U/L (ref 0–40)
ANION GAP SERPL CALCULATED.3IONS-SCNC: 14 MMOL/L (ref 7–16)
AST SERPL-CCNC: 20 U/L (ref 0–39)
BASOPHILS ABSOLUTE: 0.08 E9/L (ref 0–0.2)
BASOPHILS RELATIVE PERCENT: 0.7 % (ref 0–2)
BILIRUB SERPL-MCNC: 0.4 MG/DL (ref 0–1.2)
BILIRUBIN URINE: NEGATIVE
BLOOD, URINE: NEGATIVE
BUN BLDV-MCNC: 91 MG/DL (ref 6–23)
C-REACTIVE PROTEIN: 1.6 MG/DL (ref 0–0.4)
CALCIUM SERPL-MCNC: 8.3 MG/DL (ref 8.6–10.2)
CHLORIDE BLD-SCNC: 95 MMOL/L (ref 98–107)
CLARITY: CLEAR
CO2: 22 MMOL/L (ref 22–29)
COLOR: YELLOW
CREAT SERPL-MCNC: 4.5 MG/DL (ref 0.7–1.2)
EOSINOPHILS ABSOLUTE: 0.45 E9/L (ref 0.05–0.5)
EOSINOPHILS RELATIVE PERCENT: 4 % (ref 0–6)
GFR SERPL CREATININE-BSD FRML MDRD: 13 ML/MIN/1.73
GLUCOSE BLD-MCNC: 194 MG/DL (ref 74–99)
GLUCOSE URINE: 100 MG/DL
HCT VFR BLD CALC: 38.4 % (ref 37–54)
HEMOGLOBIN: 12.3 G/DL (ref 12.5–16.5)
IMMATURE GRANULOCYTES #: 0.16 E9/L
IMMATURE GRANULOCYTES %: 1.4 % (ref 0–5)
KETONES, URINE: NEGATIVE MG/DL
LACTIC ACID: 1.9 MMOL/L (ref 0.5–2.2)
LEUKOCYTE ESTERASE, URINE: NEGATIVE
LYMPHOCYTES ABSOLUTE: 1.29 E9/L (ref 1.5–4)
LYMPHOCYTES RELATIVE PERCENT: 11.5 % (ref 20–42)
MAGNESIUM: 2.5 MG/DL (ref 1.6–2.6)
MCH RBC QN AUTO: 29.7 PG (ref 26–35)
MCHC RBC AUTO-ENTMCNC: 32 % (ref 32–34.5)
MCV RBC AUTO: 92.8 FL (ref 80–99.9)
MONOCYTES ABSOLUTE: 1.05 E9/L (ref 0.1–0.95)
MONOCYTES RELATIVE PERCENT: 9.3 % (ref 2–12)
NEUTROPHILS ABSOLUTE: 8.2 E9/L (ref 1.8–7.3)
NEUTROPHILS RELATIVE PERCENT: 73.1 % (ref 43–80)
NITRITE, URINE: NEGATIVE
PDW BLD-RTO: 16.2 FL (ref 11.5–15)
PH UA: 5.5 (ref 5–9)
PLATELET # BLD: 301 E9/L (ref 130–450)
PMV BLD AUTO: 9.9 FL (ref 7–12)
POTASSIUM SERPL-SCNC: 5.4 MMOL/L (ref 3.5–5)
PRO-BNP: 1614 PG/ML (ref 0–125)
PROTEIN UA: NEGATIVE MG/DL
RBC # BLD: 4.14 E12/L (ref 3.8–5.8)
SEDIMENTATION RATE, ERYTHROCYTE: 36 MM/HR (ref 0–15)
SODIUM BLD-SCNC: 131 MMOL/L (ref 132–146)
SPECIFIC GRAVITY UA: 1.02 (ref 1–1.03)
TOTAL PROTEIN: 7.1 G/DL (ref 6.4–8.3)
TROPONIN, HIGH SENSITIVITY: 60 NG/L (ref 0–11)
UROBILINOGEN, URINE: 0.2 E.U./DL
WBC # BLD: 11.2 E9/L (ref 4.5–11.5)

## 2023-01-07 PROCEDURE — 36556 INSERT NON-TUNNEL CV CATH: CPT

## 2023-01-07 PROCEDURE — 83605 ASSAY OF LACTIC ACID: CPT

## 2023-01-07 PROCEDURE — 2500000003 HC RX 250 WO HCPCS: Performed by: EMERGENCY MEDICINE

## 2023-01-07 PROCEDURE — 85651 RBC SED RATE NONAUTOMATED: CPT

## 2023-01-07 PROCEDURE — 84133 ASSAY OF URINE POTASSIUM: CPT

## 2023-01-07 PROCEDURE — 71045 X-RAY EXAM CHEST 1 VIEW: CPT

## 2023-01-07 PROCEDURE — 2580000003 HC RX 258

## 2023-01-07 PROCEDURE — 82550 ASSAY OF CK (CPK): CPT

## 2023-01-07 PROCEDURE — 2580000003 HC RX 258: Performed by: EMERGENCY MEDICINE

## 2023-01-07 PROCEDURE — 96361 HYDRATE IV INFUSION ADD-ON: CPT

## 2023-01-07 PROCEDURE — 74176 CT ABD & PELVIS W/O CONTRAST: CPT

## 2023-01-07 PROCEDURE — 2060000000 HC ICU INTERMEDIATE R&B

## 2023-01-07 PROCEDURE — 82436 ASSAY OF URINE CHLORIDE: CPT

## 2023-01-07 PROCEDURE — 71045 X-RAY EXAM CHEST 1 VIEW: CPT | Performed by: RADIOLOGY

## 2023-01-07 PROCEDURE — 85025 COMPLETE CBC W/AUTO DIFF WBC: CPT

## 2023-01-07 PROCEDURE — 83880 ASSAY OF NATRIURETIC PEPTIDE: CPT

## 2023-01-07 PROCEDURE — 87088 URINE BACTERIA CULTURE: CPT

## 2023-01-07 PROCEDURE — 80053 COMPREHEN METABOLIC PANEL: CPT

## 2023-01-07 PROCEDURE — 81003 URINALYSIS AUTO W/O SCOPE: CPT

## 2023-01-07 PROCEDURE — 84156 ASSAY OF PROTEIN URINE: CPT

## 2023-01-07 PROCEDURE — 96374 THER/PROPH/DIAG INJ IV PUSH: CPT

## 2023-01-07 PROCEDURE — 86140 C-REACTIVE PROTEIN: CPT

## 2023-01-07 PROCEDURE — 80307 DRUG TEST PRSMV CHEM ANLYZR: CPT

## 2023-01-07 PROCEDURE — 84300 ASSAY OF URINE SODIUM: CPT

## 2023-01-07 PROCEDURE — 82570 ASSAY OF URINE CREATININE: CPT

## 2023-01-07 PROCEDURE — 6360000002 HC RX W HCPCS

## 2023-01-07 PROCEDURE — 83735 ASSAY OF MAGNESIUM: CPT

## 2023-01-07 PROCEDURE — 82533 TOTAL CORTISOL: CPT

## 2023-01-07 PROCEDURE — 84484 ASSAY OF TROPONIN QUANT: CPT

## 2023-01-07 PROCEDURE — 84540 ASSAY OF URINE/UREA-N: CPT

## 2023-01-07 PROCEDURE — 99285 EMERGENCY DEPT VISIT HI MDM: CPT

## 2023-01-07 PROCEDURE — 84145 PROCALCITONIN (PCT): CPT

## 2023-01-07 PROCEDURE — 93005 ELECTROCARDIOGRAM TRACING: CPT | Performed by: EMERGENCY MEDICINE

## 2023-01-07 RX ORDER — FENTANYL CITRATE 50 UG/ML
25 INJECTION, SOLUTION INTRAMUSCULAR; INTRAVENOUS ONCE
Status: DISCONTINUED | OUTPATIENT
Start: 2023-01-07 | End: 2023-01-08

## 2023-01-07 RX ORDER — 0.9 % SODIUM CHLORIDE 0.9 %
1000 INTRAVENOUS SOLUTION INTRAVENOUS ONCE
Status: COMPLETED | OUTPATIENT
Start: 2023-01-07 | End: 2023-01-08

## 2023-01-07 RX ORDER — 0.9 % SODIUM CHLORIDE 0.9 %
30 INTRAVENOUS SOLUTION INTRAVENOUS ONCE
Status: COMPLETED | OUTPATIENT
Start: 2023-01-07 | End: 2023-01-07

## 2023-01-07 RX ADMIN — SODIUM CHLORIDE 1000 ML: 9 INJECTION, SOLUTION INTRAVENOUS at 18:56

## 2023-01-07 RX ADMIN — HYDROCORTISONE SODIUM SUCCINATE 100 MG: 100 INJECTION, POWDER, FOR SOLUTION INTRAMUSCULAR; INTRAVENOUS at 21:28

## 2023-01-07 RX ADMIN — NOREPINEPHRINE BITARTRATE 5 MCG/MIN: 1 INJECTION, SOLUTION, CONCENTRATE INTRAVENOUS at 19:54

## 2023-01-07 RX ADMIN — SODIUM CHLORIDE 3000 ML: 9 INJECTION, SOLUTION INTRAVENOUS at 16:35

## 2023-01-07 ASSESSMENT — ENCOUNTER SYMPTOMS
VOMITING: 0
WHEEZING: 0
EYE PAIN: 0
DIARRHEA: 1
SORE THROAT: 0
NAUSEA: 0
RHINORRHEA: 0
SHORTNESS OF BREATH: 0
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
## 2023-01-08 ENCOUNTER — APPOINTMENT (OUTPATIENT)
Dept: CT IMAGING | Age: 68
DRG: 682 | End: 2023-01-08
Payer: MEDICARE

## 2023-01-08 LAB
ACETAMINOPHEN LEVEL: <5 MCG/ML (ref 10–30)
ALBUMIN SERPL-MCNC: 3.4 G/DL (ref 3.5–5.2)
ALP BLD-CCNC: 114 U/L (ref 40–129)
ALT SERPL-CCNC: 29 U/L (ref 0–40)
AMMONIA: 19.5 UMOL/L (ref 16–60)
AMPHETAMINE SCREEN, URINE: NOT DETECTED
ANION GAP SERPL CALCULATED.3IONS-SCNC: 13 MMOL/L (ref 7–16)
ANION GAP SERPL CALCULATED.3IONS-SCNC: 13 MMOL/L (ref 7–16)
AST SERPL-CCNC: 16 U/L (ref 0–39)
BARBITURATE SCREEN URINE: NOT DETECTED
BASOPHILS ABSOLUTE: 0.06 E9/L (ref 0–0.2)
BASOPHILS RELATIVE PERCENT: 0.6 % (ref 0–2)
BENZODIAZEPINE SCREEN, URINE: NOT DETECTED
BILIRUB SERPL-MCNC: 0.4 MG/DL (ref 0–1.2)
BUN BLDV-MCNC: 74 MG/DL (ref 6–23)
BUN BLDV-MCNC: 80 MG/DL (ref 6–23)
CALCIUM SERPL-MCNC: 7.7 MG/DL (ref 8.6–10.2)
CALCIUM SERPL-MCNC: 8.5 MG/DL (ref 8.6–10.2)
CANNABINOID SCREEN URINE: NOT DETECTED
CHLORIDE BLD-SCNC: 101 MMOL/L (ref 98–107)
CHLORIDE BLD-SCNC: 101 MMOL/L (ref 98–107)
CHLORIDE URINE RANDOM: <20 MMOL/L
CHP ED QC CHECK: NORMAL
CHP ED QC CHECK: NORMAL
CO2: 19 MMOL/L (ref 22–29)
CO2: 21 MMOL/L (ref 22–29)
COCAINE METABOLITE SCREEN URINE: NOT DETECTED
CORTISOL TOTAL: 2.66 MCG/DL (ref 2.68–18.4)
CREAT SERPL-MCNC: 2.8 MG/DL (ref 0.7–1.2)
CREAT SERPL-MCNC: 3.2 MG/DL (ref 0.7–1.2)
CREATININE URINE: 138 MG/DL (ref 40–278)
CREATININE URINE: 139 MG/DL (ref 40–278)
EKG ATRIAL RATE: 75 BPM
EKG Q-T INTERVAL: 398 MS
EKG QRS DURATION: 156 MS
EKG QTC CALCULATION (BAZETT): 502 MS
EKG R AXIS: -92 DEGREES
EKG T AXIS: 82 DEGREES
EKG VENTRICULAR RATE: 96 BPM
EOSINOPHILS ABSOLUTE: 0.06 E9/L (ref 0.05–0.5)
EOSINOPHILS RELATIVE PERCENT: 0.6 % (ref 0–6)
FENTANYL SCREEN, URINE: NOT DETECTED
GFR SERPL CREATININE-BSD FRML MDRD: 20 ML/MIN/1.73
GFR SERPL CREATININE-BSD FRML MDRD: 24 ML/MIN/1.73
GLUCOSE BLD-MCNC: 157 MG/DL
GLUCOSE BLD-MCNC: 157 MG/DL
GLUCOSE BLD-MCNC: 211 MG/DL (ref 74–99)
GLUCOSE BLD-MCNC: 247 MG/DL (ref 74–99)
HBA1C MFR BLD: 7.8 % (ref 4–5.6)
HCT VFR BLD CALC: 34.3 % (ref 37–54)
HEMOGLOBIN: 11 G/DL (ref 12.5–16.5)
IMMATURE GRANULOCYTES #: 0.1 E9/L
IMMATURE GRANULOCYTES %: 1 % (ref 0–5)
LYMPHOCYTES ABSOLUTE: 0.79 E9/L (ref 1.5–4)
LYMPHOCYTES RELATIVE PERCENT: 8.1 % (ref 20–42)
Lab: ABNORMAL
MAGNESIUM: 2.3 MG/DL (ref 1.6–2.6)
MCH RBC QN AUTO: 29.6 PG (ref 26–35)
MCHC RBC AUTO-ENTMCNC: 32.1 % (ref 32–34.5)
MCV RBC AUTO: 92.2 FL (ref 80–99.9)
METER GLUCOSE: 157 MG/DL (ref 74–99)
METER GLUCOSE: 180 MG/DL (ref 74–99)
METER GLUCOSE: 187 MG/DL (ref 74–99)
METER GLUCOSE: 210 MG/DL (ref 74–99)
METER GLUCOSE: 264 MG/DL (ref 74–99)
METER GLUCOSE: 283 MG/DL (ref 74–99)
METHADONE SCREEN, URINE: NOT DETECTED
MONOCYTES ABSOLUTE: 0.69 E9/L (ref 0.1–0.95)
MONOCYTES RELATIVE PERCENT: 7.1 % (ref 2–12)
NEUTROPHILS ABSOLUTE: 8.02 E9/L (ref 1.8–7.3)
NEUTROPHILS RELATIVE PERCENT: 82.6 % (ref 43–80)
OPIATE SCREEN URINE: NOT DETECTED
OXYCODONE URINE: POSITIVE
PDW BLD-RTO: 16.2 FL (ref 11.5–15)
PHENCYCLIDINE SCREEN URINE: NOT DETECTED
PHOSPHORUS: 5.5 MG/DL (ref 2.5–4.5)
PLATELET # BLD: 326 E9/L (ref 130–450)
PMV BLD AUTO: 9.9 FL (ref 7–12)
POTASSIUM REFLEX MAGNESIUM: 5.1 MMOL/L (ref 3.5–5)
POTASSIUM REFLEX MAGNESIUM: 5.6 MMOL/L (ref 3.5–5)
POTASSIUM, UR: 22.6 MMOL/L
PRO-BNP: 1717 PG/ML (ref 0–125)
PROCALCITONIN: 0.21 NG/ML (ref 0–0.08)
PROCALCITONIN: 0.22 NG/ML (ref 0–0.08)
PROTEIN PROTEIN: 14 MG/DL (ref 0–12)
PROTEIN/CREAT RATIO: 0.1
PROTEIN/CREAT RATIO: 0.1 (ref 0–0.2)
RBC # BLD: 3.72 E12/L (ref 3.8–5.8)
SALICYLATE, SERUM: <0.3 MG/DL (ref 0–30)
SODIUM BLD-SCNC: 133 MMOL/L (ref 132–146)
SODIUM BLD-SCNC: 135 MMOL/L (ref 132–146)
SODIUM URINE: 21 MMOL/L
TOTAL CK: 51 U/L (ref 20–200)
TOTAL PROTEIN: 6.7 G/DL (ref 6.4–8.3)
TROPONIN, HIGH SENSITIVITY: 50 NG/L (ref 0–11)
UREA NITROGEN, UR: 493 MG/DL (ref 800–1666)
URIC ACID, SERUM: 9 MG/DL (ref 3.4–7)
WBC # BLD: 9.7 E9/L (ref 4.5–11.5)

## 2023-01-08 PROCEDURE — 6360000002 HC RX W HCPCS

## 2023-01-08 PROCEDURE — 93010 ELECTROCARDIOGRAM REPORT: CPT | Performed by: INTERNAL MEDICINE

## 2023-01-08 PROCEDURE — 84145 PROCALCITONIN (PCT): CPT

## 2023-01-08 PROCEDURE — 80053 COMPREHEN METABOLIC PANEL: CPT

## 2023-01-08 PROCEDURE — 6370000000 HC RX 637 (ALT 250 FOR IP): Performed by: STUDENT IN AN ORGANIZED HEALTH CARE EDUCATION/TRAINING PROGRAM

## 2023-01-08 PROCEDURE — 80143 DRUG ASSAY ACETAMINOPHEN: CPT

## 2023-01-08 PROCEDURE — 94664 DEMO&/EVAL PT USE INHALER: CPT

## 2023-01-08 PROCEDURE — 82962 GLUCOSE BLOOD TEST: CPT

## 2023-01-08 PROCEDURE — 2580000003 HC RX 258

## 2023-01-08 PROCEDURE — 02HV33Z INSERTION OF INFUSION DEVICE INTO SUPERIOR VENA CAVA, PERCUTANEOUS APPROACH: ICD-10-PCS | Performed by: EMERGENCY MEDICINE

## 2023-01-08 PROCEDURE — 83036 HEMOGLOBIN GLYCOSYLATED A1C: CPT

## 2023-01-08 PROCEDURE — 83880 ASSAY OF NATRIURETIC PEPTIDE: CPT

## 2023-01-08 PROCEDURE — 87040 BLOOD CULTURE FOR BACTERIA: CPT

## 2023-01-08 PROCEDURE — 2060000000 HC ICU INTERMEDIATE R&B

## 2023-01-08 PROCEDURE — 84100 ASSAY OF PHOSPHORUS: CPT

## 2023-01-08 PROCEDURE — 80179 DRUG ASSAY SALICYLATE: CPT

## 2023-01-08 PROCEDURE — 36415 COLL VENOUS BLD VENIPUNCTURE: CPT

## 2023-01-08 PROCEDURE — 85025 COMPLETE CBC W/AUTO DIFF WBC: CPT

## 2023-01-08 PROCEDURE — 6370000000 HC RX 637 (ALT 250 FOR IP): Performed by: INTERNAL MEDICINE

## 2023-01-08 PROCEDURE — 2500000003 HC RX 250 WO HCPCS

## 2023-01-08 PROCEDURE — 82140 ASSAY OF AMMONIA: CPT

## 2023-01-08 PROCEDURE — 2500000003 HC RX 250 WO HCPCS: Performed by: STUDENT IN AN ORGANIZED HEALTH CARE EDUCATION/TRAINING PROGRAM

## 2023-01-08 PROCEDURE — 70450 CT HEAD/BRAIN W/O DYE: CPT

## 2023-01-08 PROCEDURE — 2580000003 HC RX 258: Performed by: STUDENT IN AN ORGANIZED HEALTH CARE EDUCATION/TRAINING PROGRAM

## 2023-01-08 PROCEDURE — 99223 1ST HOSP IP/OBS HIGH 75: CPT | Performed by: STUDENT IN AN ORGANIZED HEALTH CARE EDUCATION/TRAINING PROGRAM

## 2023-01-08 PROCEDURE — 83735 ASSAY OF MAGNESIUM: CPT

## 2023-01-08 PROCEDURE — 84484 ASSAY OF TROPONIN QUANT: CPT

## 2023-01-08 PROCEDURE — 84550 ASSAY OF BLOOD/URIC ACID: CPT

## 2023-01-08 PROCEDURE — 80048 BASIC METABOLIC PNL TOTAL CA: CPT

## 2023-01-08 PROCEDURE — 94640 AIRWAY INHALATION TREATMENT: CPT

## 2023-01-08 PROCEDURE — 6370000000 HC RX 637 (ALT 250 FOR IP)

## 2023-01-08 PROCEDURE — 6360000002 HC RX W HCPCS: Performed by: STUDENT IN AN ORGANIZED HEALTH CARE EDUCATION/TRAINING PROGRAM

## 2023-01-08 RX ORDER — ONDANSETRON 2 MG/ML
4 INJECTION INTRAMUSCULAR; INTRAVENOUS EVERY 6 HOURS PRN
Status: DISCONTINUED | OUTPATIENT
Start: 2023-01-08 | End: 2023-01-08 | Stop reason: ALTCHOICE

## 2023-01-08 RX ORDER — LISINOPRIL 5 MG/1
5 TABLET ORAL DAILY
Status: ON HOLD | COMMUNITY
End: 2023-01-11 | Stop reason: HOSPADM

## 2023-01-08 RX ORDER — ATORVASTATIN CALCIUM 40 MG/1
40 TABLET, FILM COATED ORAL DAILY
Status: DISCONTINUED | OUTPATIENT
Start: 2023-01-08 | End: 2023-01-11 | Stop reason: HOSPADM

## 2023-01-08 RX ORDER — INSULIN GLARGINE 100 [IU]/ML
55 INJECTION, SOLUTION SUBCUTANEOUS NIGHTLY
Status: DISCONTINUED | OUTPATIENT
Start: 2023-01-08 | End: 2023-01-11 | Stop reason: HOSPADM

## 2023-01-08 RX ORDER — ARFORMOTEROL TARTRATE 15 UG/2ML
15 SOLUTION RESPIRATORY (INHALATION) 2 TIMES DAILY
Status: DISCONTINUED | OUTPATIENT
Start: 2023-01-08 | End: 2023-01-11 | Stop reason: HOSPADM

## 2023-01-08 RX ORDER — ACETAMINOPHEN 500 MG
1000 TABLET ORAL DAILY PRN
Status: DISCONTINUED | OUTPATIENT
Start: 2023-01-08 | End: 2023-01-11 | Stop reason: HOSPADM

## 2023-01-08 RX ORDER — ALLOPURINOL 100 MG/1
100 TABLET ORAL DAILY
Status: DISCONTINUED | OUTPATIENT
Start: 2023-01-08 | End: 2023-01-11 | Stop reason: HOSPADM

## 2023-01-08 RX ORDER — SODIUM CHLORIDE 0.9 % (FLUSH) 0.9 %
5-40 SYRINGE (ML) INJECTION PRN
Status: DISCONTINUED | OUTPATIENT
Start: 2023-01-08 | End: 2023-01-11 | Stop reason: HOSPADM

## 2023-01-08 RX ORDER — MIDODRINE HYDROCHLORIDE 5 MG/1
10 TABLET ORAL ONCE
Status: COMPLETED | OUTPATIENT
Start: 2023-01-08 | End: 2023-01-08

## 2023-01-08 RX ORDER — SODIUM CHLORIDE 9 MG/ML
INJECTION, SOLUTION INTRAVENOUS PRN
Status: DISCONTINUED | OUTPATIENT
Start: 2023-01-08 | End: 2023-01-11 | Stop reason: HOSPADM

## 2023-01-08 RX ORDER — LANOLIN ALCOHOL/MO/W.PET/CERES
400 CREAM (GRAM) TOPICAL DAILY
Status: DISCONTINUED | OUTPATIENT
Start: 2023-01-08 | End: 2023-01-11 | Stop reason: HOSPADM

## 2023-01-08 RX ORDER — ONDANSETRON 4 MG/1
4 TABLET, ORALLY DISINTEGRATING ORAL EVERY 8 HOURS PRN
Status: DISCONTINUED | OUTPATIENT
Start: 2023-01-08 | End: 2023-01-08 | Stop reason: ALTCHOICE

## 2023-01-08 RX ORDER — ACETAMINOPHEN 325 MG/1
650 TABLET ORAL EVERY 6 HOURS PRN
Status: DISCONTINUED | OUTPATIENT
Start: 2023-01-08 | End: 2023-01-08 | Stop reason: SDUPTHER

## 2023-01-08 RX ORDER — MIDODRINE HYDROCHLORIDE 5 MG/1
10 TABLET ORAL
Status: DISCONTINUED | OUTPATIENT
Start: 2023-01-08 | End: 2023-01-10

## 2023-01-08 RX ORDER — SODIUM CHLORIDE 9 MG/ML
INJECTION, SOLUTION INTRAVENOUS CONTINUOUS
Status: DISCONTINUED | OUTPATIENT
Start: 2023-01-08 | End: 2023-01-10

## 2023-01-08 RX ORDER — BUDESONIDE AND FORMOTEROL FUMARATE DIHYDRATE 160; 4.5 UG/1; UG/1
2 AEROSOL RESPIRATORY (INHALATION) 2 TIMES DAILY
Status: DISCONTINUED | OUTPATIENT
Start: 2023-01-08 | End: 2023-01-08 | Stop reason: CLARIF

## 2023-01-08 RX ORDER — HYDROXYZINE PAMOATE 25 MG/1
25 CAPSULE ORAL 3 TIMES DAILY PRN
Status: DISCONTINUED | OUTPATIENT
Start: 2023-01-08 | End: 2023-01-11 | Stop reason: HOSPADM

## 2023-01-08 RX ORDER — SODIUM CHLORIDE 0.9 % (FLUSH) 0.9 %
5-40 SYRINGE (ML) INJECTION EVERY 12 HOURS SCHEDULED
Status: DISCONTINUED | OUTPATIENT
Start: 2023-01-08 | End: 2023-01-11 | Stop reason: HOSPADM

## 2023-01-08 RX ORDER — IPRATROPIUM BROMIDE AND ALBUTEROL SULFATE 2.5; .5 MG/3ML; MG/3ML
1 SOLUTION RESPIRATORY (INHALATION)
Status: DISCONTINUED | OUTPATIENT
Start: 2023-01-08 | End: 2023-01-11 | Stop reason: HOSPADM

## 2023-01-08 RX ORDER — LEVOTHYROXINE SODIUM 88 UG/1
88 TABLET ORAL DAILY
Status: DISCONTINUED | OUTPATIENT
Start: 2023-01-08 | End: 2023-01-11 | Stop reason: HOSPADM

## 2023-01-08 RX ORDER — POLYETHYLENE GLYCOL 3350 17 G/17G
17 POWDER, FOR SOLUTION ORAL DAILY PRN
Status: DISCONTINUED | OUTPATIENT
Start: 2023-01-08 | End: 2023-01-11 | Stop reason: HOSPADM

## 2023-01-08 RX ORDER — INSULIN LISPRO 100 [IU]/ML
0-8 INJECTION, SOLUTION INTRAVENOUS; SUBCUTANEOUS EVERY 4 HOURS
Status: DISCONTINUED | OUTPATIENT
Start: 2023-01-08 | End: 2023-01-08

## 2023-01-08 RX ORDER — POTASSIUM CHLORIDE 29.8 MG/ML
20 INJECTION INTRAVENOUS PRN
Status: DISCONTINUED | OUTPATIENT
Start: 2023-01-08 | End: 2023-01-08 | Stop reason: ALTCHOICE

## 2023-01-08 RX ORDER — MIDODRINE HYDROCHLORIDE 5 MG/1
10 TABLET ORAL
Status: CANCELLED | OUTPATIENT
Start: 2023-01-08

## 2023-01-08 RX ORDER — BUDESONIDE 0.5 MG/2ML
0.5 INHALANT ORAL 2 TIMES DAILY
Status: DISCONTINUED | OUTPATIENT
Start: 2023-01-08 | End: 2023-01-11 | Stop reason: HOSPADM

## 2023-01-08 RX ORDER — INSULIN LISPRO 100 [IU]/ML
20 INJECTION, SOLUTION INTRAVENOUS; SUBCUTANEOUS
Status: DISCONTINUED | OUTPATIENT
Start: 2023-01-08 | End: 2023-01-11 | Stop reason: HOSPADM

## 2023-01-08 RX ORDER — GABAPENTIN 600 MG/1
600 TABLET ORAL 2 TIMES DAILY
COMMUNITY

## 2023-01-08 RX ORDER — FERROUS SULFATE 325(65) MG
325 TABLET ORAL
Status: DISCONTINUED | OUTPATIENT
Start: 2023-01-08 | End: 2023-01-11 | Stop reason: HOSPADM

## 2023-01-08 RX ORDER — DEXTROSE MONOHYDRATE 100 MG/ML
INJECTION, SOLUTION INTRAVENOUS CONTINUOUS PRN
Status: DISCONTINUED | OUTPATIENT
Start: 2023-01-08 | End: 2023-01-11 | Stop reason: HOSPADM

## 2023-01-08 RX ORDER — POTASSIUM CHLORIDE 7.45 MG/ML
10 INJECTION INTRAVENOUS PRN
Status: DISCONTINUED | OUTPATIENT
Start: 2023-01-08 | End: 2023-01-08 | Stop reason: ALTCHOICE

## 2023-01-08 RX ORDER — ACETAMINOPHEN 650 MG/1
650 SUPPOSITORY RECTAL EVERY 6 HOURS PRN
Status: DISCONTINUED | OUTPATIENT
Start: 2023-01-08 | End: 2023-01-11 | Stop reason: HOSPADM

## 2023-01-08 RX ORDER — CALCIUM CARBONATE/VITAMIN D3 500-10/5ML
400 LIQUID (ML) ORAL DAILY
Status: DISCONTINUED | OUTPATIENT
Start: 2023-01-08 | End: 2023-01-08 | Stop reason: CLARIF

## 2023-01-08 RX ORDER — INSULIN LISPRO 100 [IU]/ML
0-8 INJECTION, SOLUTION INTRAVENOUS; SUBCUTANEOUS
Status: DISCONTINUED | OUTPATIENT
Start: 2023-01-08 | End: 2023-01-11 | Stop reason: HOSPADM

## 2023-01-08 RX ORDER — ASPIRIN 81 MG/1
81 TABLET ORAL DAILY
Status: DISCONTINUED | OUTPATIENT
Start: 2023-01-08 | End: 2023-01-11 | Stop reason: HOSPADM

## 2023-01-08 RX ORDER — INSULIN LISPRO 100 [IU]/ML
0-4 INJECTION, SOLUTION INTRAVENOUS; SUBCUTANEOUS NIGHTLY
Status: DISCONTINUED | OUTPATIENT
Start: 2023-01-08 | End: 2023-01-11 | Stop reason: HOSPADM

## 2023-01-08 RX ORDER — MAGNESIUM SULFATE IN WATER 40 MG/ML
2000 INJECTION, SOLUTION INTRAVENOUS PRN
Status: DISCONTINUED | OUTPATIENT
Start: 2023-01-08 | End: 2023-01-08 | Stop reason: ALTCHOICE

## 2023-01-08 RX ADMIN — BUDESONIDE 500 MCG: 0.5 SUSPENSION RESPIRATORY (INHALATION) at 19:54

## 2023-01-08 RX ADMIN — HYDROCORTISONE SODIUM SUCCINATE 100 MG: 100 INJECTION, POWDER, FOR SOLUTION INTRAMUSCULAR; INTRAVENOUS at 16:37

## 2023-01-08 RX ADMIN — MIDODRINE HYDROCHLORIDE 10 MG: 5 TABLET ORAL at 07:38

## 2023-01-08 RX ADMIN — ARFORMOTEROL TARTRATE 15 MCG: 15 SOLUTION RESPIRATORY (INHALATION) at 08:27

## 2023-01-08 RX ADMIN — DEXTROSE MONOHYDRATE 250 ML: 10 INJECTION, SOLUTION INTRAVENOUS at 02:44

## 2023-01-08 RX ADMIN — Medication 400 MG: at 08:10

## 2023-01-08 RX ADMIN — NOREPINEPHRINE BITARTRATE 7 MCG/MIN: 1 INJECTION, SOLUTION, CONCENTRATE INTRAVENOUS at 01:23

## 2023-01-08 RX ADMIN — SODIUM CHLORIDE, PRESERVATIVE FREE 10 ML: 5 INJECTION INTRAVENOUS at 20:31

## 2023-01-08 RX ADMIN — ASPIRIN 81 MG: 81 TABLET, COATED ORAL at 07:38

## 2023-01-08 RX ADMIN — SODIUM BICARBONATE 50 MEQ: 84 INJECTION, SOLUTION INTRAVENOUS at 02:47

## 2023-01-08 RX ADMIN — MIDODRINE HYDROCHLORIDE 10 MG: 5 TABLET ORAL at 07:41

## 2023-01-08 RX ADMIN — INSULIN HUMAN 10 UNITS: 100 INJECTION, SOLUTION PARENTERAL at 02:53

## 2023-01-08 RX ADMIN — APIXABAN 5 MG: 5 TABLET, FILM COATED ORAL at 20:31

## 2023-01-08 RX ADMIN — ALLOPURINOL 100 MG: 100 TABLET ORAL at 08:10

## 2023-01-08 RX ADMIN — ARFORMOTEROL TARTRATE 15 MCG: 15 SOLUTION RESPIRATORY (INHALATION) at 19:54

## 2023-01-08 RX ADMIN — APIXABAN 5 MG: 5 TABLET, FILM COATED ORAL at 10:19

## 2023-01-08 RX ADMIN — ACETAMINOPHEN 1000 MG: 500 TABLET ORAL at 23:39

## 2023-01-08 RX ADMIN — FERROUS SULFATE TAB 325 MG (65 MG ELEMENTAL FE) 325 MG: 325 (65 FE) TAB at 08:10

## 2023-01-08 RX ADMIN — LEVOTHYROXINE SODIUM 88 MCG: 0.09 TABLET ORAL at 08:10

## 2023-01-08 RX ADMIN — ATORVASTATIN CALCIUM 40 MG: 40 TABLET, FILM COATED ORAL at 07:38

## 2023-01-08 RX ADMIN — SODIUM CHLORIDE: 9 INJECTION, SOLUTION INTRAVENOUS at 07:46

## 2023-01-08 RX ADMIN — BUDESONIDE 500 MCG: 0.5 SUSPENSION RESPIRATORY (INHALATION) at 08:26

## 2023-01-08 RX ADMIN — INSULIN GLARGINE 55 UNITS: 100 INJECTION, SOLUTION SUBCUTANEOUS at 20:32

## 2023-01-08 RX ADMIN — HYDROCORTISONE SODIUM SUCCINATE 100 MG: 100 INJECTION, POWDER, FOR SOLUTION INTRAMUSCULAR; INTRAVENOUS at 20:31

## 2023-01-08 RX ADMIN — IPRATROPIUM BROMIDE AND ALBUTEROL SULFATE 1 AMPULE: .5; 2.5 SOLUTION RESPIRATORY (INHALATION) at 19:54

## 2023-01-08 RX ADMIN — HYDROCORTISONE SODIUM SUCCINATE 100 MG: 100 INJECTION, POWDER, FOR SOLUTION INTRAMUSCULAR; INTRAVENOUS at 08:09

## 2023-01-08 RX ADMIN — CALCIUM GLUCONATE 1000 MG: 20 INJECTION, SOLUTION INTRAVENOUS at 02:50

## 2023-01-08 ASSESSMENT — PAIN DESCRIPTION - LOCATION
LOCATION: BACK
LOCATION: BACK

## 2023-01-08 ASSESSMENT — PAIN DESCRIPTION - ORIENTATION: ORIENTATION: LOWER

## 2023-01-08 ASSESSMENT — PAIN SCALES - GENERAL
PAINLEVEL_OUTOF10: 3
PAINLEVEL_OUTOF10: 6
PAINLEVEL_OUTOF10: 5

## 2023-01-08 NOTE — ED NOTES
Levophed stopped at 0530am, Pts BP was 134/62 MAP [address] [place] [address]  01/08/23 7460

## 2023-01-08 NOTE — H&P
[facility] History and Physical      CHIEF COMPLAINT: Extremity weakness    History of Present Illness:  59-year-old male with a past medical history of diabetes, hyperlipidemia, hypertension, COPD on 3 to 5 L at home, A. pardeep on Eliquis presents the emergency department for difficulty ambulating. Patient was recently discharged from [facility] on December 30 after an ICU admission for hypovolemia and acute encephalopathy. Patient was found to have low blood pressure minimally responsive to fluid resuscitation in the emergency department. Patient was found to have an RADHA that resolved on his past admission however patient's creatinine is now elevated again. Patient states that he is compliant with all his medications and his follow-up visits. Patient denies any night sweats or fevers recently. Patient denies any recent travel or sick contacts.     Informant(s) for H&P: Patient and patient's wife    REVIEW OF SYSTEMS:  A comprehensive review of systems was negative except for: what is in the HPI      PMH:  Past Medical History:   Diagnosis Date    RADHA (acute kidney injury) ([facility] 3/10/2022    Atrial fibrillation ([facility]     Carpal tunnel syndrome     Encounter regarding vascular access for dialysis for ESRD ([facility] 3/12/2022    Hyperlipidemia     Hypertension     Hypothyroidism     Lung nodule     Nocturnal hypoxemia due to obesity 8/8/2013    Obesity     NIKO (obstructive sleep apnea) 8/8/2013    [facility]    Osteoarthritis     Pinched nerve     Lumbar    Restrictive lung disease secondary to obesity 7/25/2016    Sinus node dysfunction (HCC)     Type II or unspecified type diabetes mellitus without mention of complication, not stated as uncontrolled        Surgical History:  Past Surgical History:   Procedure Laterality Date    BACK SURGERY  2012    [facility]. Pinched nerve in back    BACK SURGERY  05/30/2017    BACK SURGERY N/A 11/8/2022    EXCISON OF SOFT TISSUE NEOPLASM OF UPPER BACK performed by Sera Archuleta MD at [address]  2007    multiple colon polyps    COLONOSCOPY  06/04/2012    COLONOSCOPY    COLONOSCOPY  04/26/2022    polyp; diverticula--sarah    COLONOSCOPY N/A 4/26/2022    COLONOSCOPY POLYPECTOMY HOT BIOPSY (Snare) performed by Sera Archuleta MD at [address]      lennie cataracts implant    HERNIA REPAIR      umbilical    PACEMAKER PLACEMENT  10/03/2017    Medtronic dual chamber    Dr. Megan Stewart Right        Medications Prior to Admission:    Prior to Admission medications    Medication Sig Start Date End Date Taking? Authorizing Provider   metoprolol succinate (TOPROL XL) 25 MG extended release tablet Take 1 tablet by mouth daily 12/30/22   Priscilla Walton MD   bumetanide Jacqueline William) 2 MG tablet Take 1 tablet by mouth 2 times daily 12/30/22   Priscilla Walton MD   Magnesium Oxide 400 MG CAPS Take 400 mg by mouth daily 12/30/22   Priscilla Walton MD   potassium chloride (KLOR-CON M) 20 MEQ extended release tablet Take 1 tablet by mouth 2 times daily for 2 doses 12/30/22 12/31/22  Priscilla Walton MD   insulin glargine [facility]) 100 UNIT/ML injection pen Inject 55 Units into the skin nightly PAP medication. 11/28/22   Kimberlee Lundberg, DO   mometasone-formoterol [facility]) 200-5 MCG/ACT inhaler Inhale 2 puffs into the lungs 2 times daily Rinse mouth after using. PAP medication. 11/16/22   Leida Posey MD   Insulin Pen Needle (PEN NEEDLES) 31G X 6 MM MISC Once daily. May substitute brand for insurance coverage. 11/16/22   Dev Malloy MD   Lancets MISC Give Delica lancets. Tests twice a day A.M.  And P.M 11/16/22   Dev Malloy MD   blood glucose test strips (ONETOUCH VERIO) strip TEST IN THE MORNING AND IN THE EVENING 11/16/22   Dev Malloy MD   insulin lispro, 1 Unit Dial, (HUMALOG KWIKPEN) 100 UNIT/ML SOPN Inject 20 Units into the skin 3 times daily (before meals) for 225 doses 11/10/22 1/24/23  Willian Gupta,    ferrous sulfate (IRON 325) 325 (65 Fe) MG tablet Take 325 mg by mouth daily (with breakfast)    Historical Provider, MD   oxyCODONE-acetaminophen (PERCOCET) 7.5-325 MG per tablet Take 1 tablet by mouth 2 times daily as needed. 10/3/22   Historical Provider, MD   allopurinol (ZYLOPRIM) 100 MG tablet Take 100 mg by mouth daily    Historical Provider, MD   Cholecalciferol (VITAMIN D) 50 MCG (2000 UT) CAPS capsule Take 2,000 Units by mouth daily    Historical Provider, MD   metFORMIN (GLUCOPHAGE) 500 MG tablet Take 1 tablet by mouth 2 times daily (with meals) 8/31/22   Joel Agarwal MD   aspirin (ASPIR-LOW) 81 MG EC tablet TAKE ONE TABLET BY MOUTH EVERY DAY 8/31/22   Joel Agarwal MD   ELIQUIS 5 MG TABS tablet Take 1 tablet by mouth in the morning and 1 tablet before bedtime. 7/18/22   Tete Smith MD   atorvastatin (LIPITOR) 40 MG tablet TAKE ONE TABLET BY MOUTH DAILY 7/18/22   Tete Smith MD   levothyroxine (SYNTHROID) 88 MCG tablet Take 1 tablet by mouth in the morning. 7/18/22 1/14/23  Tete Smith MD   empagliflozin (JARDIANCE) 25 MG tablet Take 1 tablet by mouth daily 6/23/22 6/23/23  Virgil Cheatham DO   Insulin Pen Needle (PEN NEEDLES) 32G X 6 MM MISC Take insulin daily 2/28/22   Marcelline Ormond, MD   Misc. Devices MISC Please add portability to current O2 order. Resp to evaluate patient for OCD device. 9/15/21   Camron Delong MD   Misc. Devices KIT Dispense 1 blood pressure cuff. 2/23/21   Omaira Ratliff MD   OXYGEN Inhale 2.5 L into the lungs nightly  Patient taking differently: Inhale 2.5 L into the lungs nightly Spouse states uses  5 L/min via nc bedtime 7/28/20   Zain Oconnell MD   acetaminophen (TYLENOL) 500 MG tablet Take 1,000 mg by mouth daily as needed for Pain    Historical Provider, MD       Allergies:    Patient has no known allergies. Social History:    reports that he quit smoking about 18 years ago. His smoking use included cigarettes.  He has a 3.50 pack-year smoking history. He quit smokeless tobacco use about 19 years ago. He reports that he does not drink alcohol and does not use drugs. Family History:   family history includes Amyotrophic lateral sclerosis in his father; Cancer in his brother and mother; Diabetes in his brother; Heart Disease in his mother; High Blood Pressure in his brother and father. PHYSICAL EXAM:  Vitals:  /66   Pulse 67   Temp 97.7 °F (36.5 °C) (Oral)   Resp 18   Ht 5' 7\" (1.702 m)   Wt 225 lb (102.1 kg)   SpO2 98%   BMI 35.24 kg/m²     General Appearance: alert and oriented to person, place and time and in no acute distress  Skin: warm and dry  Head: normocephalic and atraumatic  Eyes: pupils equal, round, and reactive to light, extraocular eye movements intact, conjunctivae normal  Neck: neck supple and non tender without mass   Pulmonary/Chest: clear to auscultation bilaterally- no wheezes, rales or rhonchi, normal air movement, no respiratory distress  Cardiovascular: normal rate, normal S1 and S2 and no carotid bruits  Abdomen: soft, non-tender, non-distended, normal bowel sounds, no masses or organomegaly  Extremities: Left arm AC ecchymosis, no cyanosis, no clubbing and no edema, venous stasis  Neurologic: no cranial nerve deficit and speech normal        LABS:  Recent Labs     01/07/23  1640   *   K 5.4*   CL 95*   CO2 22   BUN 91*   CREATININE 4.5*   GLUCOSE 194*   CALCIUM 8.3*       Recent Labs     01/07/23  1623   WBC 11.2   RBC 4.14   HGB 12.3*   HCT 38.4   MCV 92.8   MCH 29.7   MCHC 32.0   RDW 16.2*      MPV 9.9       No results for input(s): POCGLU in the last 72 hours.     CBC:   Lab Results   Component Value Date/Time    WBC 11.2 01/07/2023 04:23 PM    RBC 4.14 01/07/2023 04:23 PM    HGB 12.3 01/07/2023 04:23 PM    HCT 38.4 01/07/2023 04:23 PM    MCV 92.8 01/07/2023 04:23 PM    MCH 29.7 01/07/2023 04:23 PM    MCHC 32.0 01/07/2023 04:23 PM    RDW 16.2 01/07/2023 04:23 PM     01/07/2023 04:23 PM    MPV 9.9 01/07/2023 04:23 PM     CMP:    Lab Results   Component Value Date/Time     01/07/2023 04:40 PM    K 5.4 01/07/2023 04:40 PM    K 6.5 12/25/2022 05:54 AM    CL 95 01/07/2023 04:40 PM    CO2 22 01/07/2023 04:40 PM    BUN 91 01/07/2023 04:40 PM    CREATININE 4.5 01/07/2023 04:40 PM    GFRAA >60 10/03/2022 10:55 AM    LABGLOM 13 01/07/2023 04:40 PM    GLUCOSE 194 01/07/2023 04:40 PM    GLUCOSE 133 04/18/2012 08:43 AM    PROT 7.1 01/07/2023 04:40 PM    LABALBU 3.5 01/07/2023 04:40 PM    LABALBU 4.4 10/25/2011 09:50 AM    CALCIUM 8.3 01/07/2023 04:40 PM    BILITOT 0.4 01/07/2023 04:40 PM    ALKPHOS 118 01/07/2023 04:40 PM    AST 20 01/07/2023 04:40 PM    ALT 34 01/07/2023 04:40 PM     Albumin:    Lab Results   Component Value Date/Time    LABALBU 3.5 01/07/2023 04:40 PM    LABALBU 4.4 10/25/2011 09:50 AM       Radiology:   XR CHEST PORTABLE   Final Result   Right IJ central line tip localized at proximal SVC. Mild cardiomegaly. CT ABDOMEN PELVIS WO CONTRAST Additional Contrast? None   Final Result   No CT etiology for patient's acute lower back pain identified. Stable L3-L5   fusion changes with laminectomies and fixation. Significant decrease in previously seen peripancreatic and central mesenteric   inflammatory change with a minimal amount remaining. XR CHEST PORTABLE   Final Result   Enlarged cardiomediastinal silhouette. No visible acute pulmonary disease. EKG:     ASSESSMENT:      Principal Problem:    Hypovolemia  Active Problems:    Hypovolemic shock (HCC)  Resolved Problems:    * No resolved hospital problems. *      PLAN:    1. Unspecified shock-patient is not responsive to 4 L of NaCl boluses. Patient is currently on Levophed. Patient will be admitted ICU. Patient will likely need a echocardiogram and a cardiac work-up for cardiogenic shock versus hypovolemic shock. Consider cardiology consultation.   2.  RADHA on CKD-continue fluid resuscitation. Avoid nephrotoxic agents. nephrology consultation. 3.  Hyponatremia-likely secondary to hypovolemia. Continue IV fluid resuscitation. Pending BMP daily. 4.  COPD-continue inhalers and duo nebs as needed. 5.  Chronic hypoxia from patient on 3 to 5 L at home. Monitor oxygenation. 6.  Hypothyroidism-continue Synthroid  7. Hypertension-hold home antihypertensive medications secondary to #1.  8.  A. fib-hold metoprolol. Continue Eliquis twice daily. 9.  Diabetes type 2-continue home insulin. Continue metformin. Diabetic diet. SSI as needed. Blood sugar checks ACH S.  10.  Iron deficiency anemia-continue ferrous sulfate. Monitor CBC daily. Hemoglobin stable at this time. Code Status: Full  DVT prophylaxis: Eliquis      NOTE: This report was transcribed using voice recognition software. Every effort was made to ensure accuracy; however, inadvertent computerized transcription errors may be present.   Electronically signed by Batool Mccray MD on 1/8/2023 at 12:12 AM
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
## 2023-01-08 NOTE — PLAN OF CARE
Patient was admitted overnight due to hypovolemia and generalized weakness. Was initially requiring pressors but currently is weaned off with on BP stable on hydrocortisone. Downgraded to telemetry from ICU. Continue fluid resuscitation's. RADHA improving. Continue to monitor.

## 2023-01-09 PROBLEM — G93.41 ACUTE METABOLIC ENCEPHALOPATHY: Status: ACTIVE | Noted: 2023-01-09

## 2023-01-09 LAB
ANION GAP SERPL CALCULATED.3IONS-SCNC: 11 MMOL/L (ref 7–16)
BASOPHILS ABSOLUTE: 0.02 E9/L (ref 0–0.2)
BASOPHILS RELATIVE PERCENT: 0.3 % (ref 0–2)
BUN BLDV-MCNC: 56 MG/DL (ref 6–23)
CALCIUM SERPL-MCNC: 8.5 MG/DL (ref 8.6–10.2)
CHLORIDE BLD-SCNC: 104 MMOL/L (ref 98–107)
CO2: 21 MMOL/L (ref 22–29)
CREAT SERPL-MCNC: 1.6 MG/DL (ref 0.7–1.2)
EOSINOPHILS ABSOLUTE: 0.01 E9/L (ref 0.05–0.5)
EOSINOPHILS RELATIVE PERCENT: 0.1 % (ref 0–6)
GFR SERPL CREATININE-BSD FRML MDRD: 46 ML/MIN/1.73
GLUCOSE BLD-MCNC: 220 MG/DL (ref 74–99)
HCT VFR BLD CALC: 31 % (ref 37–54)
HEMOGLOBIN: 10 G/DL (ref 12.5–16.5)
IMMATURE GRANULOCYTES #: 0.07 E9/L
IMMATURE GRANULOCYTES %: 1 % (ref 0–5)
LYMPHOCYTES ABSOLUTE: 0.66 E9/L (ref 1.5–4)
LYMPHOCYTES RELATIVE PERCENT: 9.8 % (ref 20–42)
MAGNESIUM: 2.2 MG/DL (ref 1.6–2.6)
MCH RBC QN AUTO: 29.9 PG (ref 26–35)
MCHC RBC AUTO-ENTMCNC: 32.3 % (ref 32–34.5)
MCV RBC AUTO: 92.5 FL (ref 80–99.9)
METER GLUCOSE: 166 MG/DL (ref 74–99)
METER GLUCOSE: 197 MG/DL (ref 74–99)
METER GLUCOSE: 227 MG/DL (ref 74–99)
METER GLUCOSE: 240 MG/DL (ref 74–99)
MONOCYTES ABSOLUTE: 0.34 E9/L (ref 0.1–0.95)
MONOCYTES RELATIVE PERCENT: 5.1 % (ref 2–12)
NEUTROPHILS ABSOLUTE: 5.62 E9/L (ref 1.8–7.3)
NEUTROPHILS RELATIVE PERCENT: 83.7 % (ref 43–80)
PDW BLD-RTO: 16.1 FL (ref 11.5–15)
PHOSPHORUS: 4 MG/DL (ref 2.5–4.5)
PLATELET # BLD: 252 E9/L (ref 130–450)
PMV BLD AUTO: 9.9 FL (ref 7–12)
POTASSIUM SERPL-SCNC: 4.5 MMOL/L (ref 3.5–5)
RBC # BLD: 3.35 E12/L (ref 3.8–5.8)
SODIUM BLD-SCNC: 136 MMOL/L (ref 132–146)
WBC # BLD: 6.7 E9/L (ref 4.5–11.5)

## 2023-01-09 PROCEDURE — 85025 COMPLETE CBC W/AUTO DIFF WBC: CPT

## 2023-01-09 PROCEDURE — 6370000000 HC RX 637 (ALT 250 FOR IP): Performed by: INTERNAL MEDICINE

## 2023-01-09 PROCEDURE — 6360000002 HC RX W HCPCS: Performed by: STUDENT IN AN ORGANIZED HEALTH CARE EDUCATION/TRAINING PROGRAM

## 2023-01-09 PROCEDURE — 2580000003 HC RX 258: Performed by: STUDENT IN AN ORGANIZED HEALTH CARE EDUCATION/TRAINING PROGRAM

## 2023-01-09 PROCEDURE — 94640 AIRWAY INHALATION TREATMENT: CPT

## 2023-01-09 PROCEDURE — 6370000000 HC RX 637 (ALT 250 FOR IP): Performed by: STUDENT IN AN ORGANIZED HEALTH CARE EDUCATION/TRAINING PROGRAM

## 2023-01-09 PROCEDURE — 80048 BASIC METABOLIC PNL TOTAL CA: CPT

## 2023-01-09 PROCEDURE — 6360000002 HC RX W HCPCS

## 2023-01-09 PROCEDURE — 99232 SBSQ HOSP IP/OBS MODERATE 35: CPT | Performed by: NURSE PRACTITIONER

## 2023-01-09 PROCEDURE — 2060000000 HC ICU INTERMEDIATE R&B

## 2023-01-09 PROCEDURE — 36415 COLL VENOUS BLD VENIPUNCTURE: CPT

## 2023-01-09 PROCEDURE — 2580000003 HC RX 258

## 2023-01-09 PROCEDURE — 6360000002 HC RX W HCPCS: Performed by: NURSE PRACTITIONER

## 2023-01-09 PROCEDURE — 84100 ASSAY OF PHOSPHORUS: CPT

## 2023-01-09 PROCEDURE — 97165 OT EVAL LOW COMPLEX 30 MIN: CPT

## 2023-01-09 PROCEDURE — 82962 GLUCOSE BLOOD TEST: CPT

## 2023-01-09 PROCEDURE — 6370000000 HC RX 637 (ALT 250 FOR IP)

## 2023-01-09 PROCEDURE — 83735 ASSAY OF MAGNESIUM: CPT

## 2023-01-09 RX ORDER — TRAMADOL HYDROCHLORIDE 50 MG/1
50 TABLET ORAL EVERY 6 HOURS PRN
Status: DISCONTINUED | OUTPATIENT
Start: 2023-01-09 | End: 2023-01-11 | Stop reason: HOSPADM

## 2023-01-09 RX ADMIN — INSULIN LISPRO 2 UNITS: 100 INJECTION, SOLUTION INTRAVENOUS; SUBCUTANEOUS at 11:18

## 2023-01-09 RX ADMIN — SODIUM CHLORIDE, PRESERVATIVE FREE 10 ML: 5 INJECTION INTRAVENOUS at 21:42

## 2023-01-09 RX ADMIN — ATORVASTATIN CALCIUM 40 MG: 40 TABLET, FILM COATED ORAL at 08:31

## 2023-01-09 RX ADMIN — BUDESONIDE 500 MCG: 0.5 SUSPENSION RESPIRATORY (INHALATION) at 08:12

## 2023-01-09 RX ADMIN — LEVOTHYROXINE SODIUM 88 MCG: 0.09 TABLET ORAL at 06:03

## 2023-01-09 RX ADMIN — ACETAMINOPHEN 1000 MG: 500 TABLET ORAL at 12:17

## 2023-01-09 RX ADMIN — IPRATROPIUM BROMIDE AND ALBUTEROL SULFATE 1 AMPULE: .5; 2.5 SOLUTION RESPIRATORY (INHALATION) at 15:54

## 2023-01-09 RX ADMIN — ALLOPURINOL 100 MG: 100 TABLET ORAL at 08:31

## 2023-01-09 RX ADMIN — ASPIRIN 81 MG: 81 TABLET, COATED ORAL at 08:31

## 2023-01-09 RX ADMIN — SODIUM CHLORIDE: 9 INJECTION, SOLUTION INTRAVENOUS at 14:25

## 2023-01-09 RX ADMIN — SODIUM CHLORIDE, PRESERVATIVE FREE 10 ML: 5 INJECTION INTRAVENOUS at 08:31

## 2023-01-09 RX ADMIN — MIDODRINE HYDROCHLORIDE 10 MG: 5 TABLET ORAL at 11:17

## 2023-01-09 RX ADMIN — HYDROCORTISONE SODIUM SUCCINATE 100 MG: 100 INJECTION, POWDER, FOR SOLUTION INTRAMUSCULAR; INTRAVENOUS at 17:08

## 2023-01-09 RX ADMIN — MIDODRINE HYDROCHLORIDE 10 MG: 5 TABLET ORAL at 08:31

## 2023-01-09 RX ADMIN — APIXABAN 5 MG: 5 TABLET, FILM COATED ORAL at 21:42

## 2023-01-09 RX ADMIN — MIDODRINE HYDROCHLORIDE 10 MG: 5 TABLET ORAL at 17:08

## 2023-01-09 RX ADMIN — FERROUS SULFATE TAB 325 MG (65 MG ELEMENTAL FE) 325 MG: 325 (65 FE) TAB at 08:31

## 2023-01-09 RX ADMIN — INSULIN GLARGINE 55 UNITS: 100 INJECTION, SOLUTION SUBCUTANEOUS at 21:42

## 2023-01-09 RX ADMIN — Medication 400 MG: at 08:31

## 2023-01-09 RX ADMIN — MIDODRINE HYDROCHLORIDE 10 MG: 5 TABLET ORAL at 11:45

## 2023-01-09 RX ADMIN — IPRATROPIUM BROMIDE AND ALBUTEROL SULFATE 1 AMPULE: .5; 2.5 SOLUTION RESPIRATORY (INHALATION) at 08:11

## 2023-01-09 RX ADMIN — ARFORMOTEROL TARTRATE 15 MCG: 15 SOLUTION RESPIRATORY (INHALATION) at 08:12

## 2023-01-09 RX ADMIN — APIXABAN 5 MG: 5 TABLET, FILM COATED ORAL at 08:31

## 2023-01-09 RX ADMIN — TRAMADOL HYDROCHLORIDE 50 MG: 50 TABLET, COATED ORAL at 21:41

## 2023-01-09 RX ADMIN — SODIUM CHLORIDE: 9 INJECTION, SOLUTION INTRAVENOUS at 03:04

## 2023-01-09 RX ADMIN — HYDROCORTISONE SODIUM SUCCINATE 100 MG: 100 INJECTION, POWDER, FOR SOLUTION INTRAMUSCULAR; INTRAVENOUS at 06:03

## 2023-01-09 ASSESSMENT — PAIN SCALES - GENERAL
PAINLEVEL_OUTOF10: 3
PAINLEVEL_OUTOF10: 10
PAINLEVEL_OUTOF10: 0

## 2023-01-09 ASSESSMENT — PAIN DESCRIPTION - LOCATION
LOCATION: BACK
LOCATION: BACK

## 2023-01-09 NOTE — CARE COORDINATION
Transition of Care-Met with patient at the bedside, introduced myself and CM role. Patient stated he was admitted from home, he lives with his wife in a two story home, recent hospital admission, was discharged 12/30 to [facility], per facility liaison patient left [facility] 1/3/23. Patient had moments of forgetfulness, call placed to his wife  Petra phone # [phone] to discuss discharge planning. Unable to reach-left message. Patient stated he intends to return home at discharge, wife or daughter to transport home, he does anticipate any needs. PT/OT ordered.      Amy FLORESN, RN  [facility]

## 2023-01-09 NOTE — PROGRESS NOTES
[facility] Progress Note    Admitting Date and Time: 1/7/2023  3:10 PM  Admit Dx: Hypovolemia [E86.1]  Hypovolemic shock ([facility] [R57.1]  RADHA (acute kidney injury) ([facility] [N17.9]  Hypotension, unspecified hypotension type [I95.9]    Subjective:  Patient is being followed for Hypovolemia [E86.1]  Hypovolemic shock ([facility] [R57.1]  RADHA (acute kidney injury) ([facility] [N17.9]  Hypotension, unspecified hypotension type [I95.9]     Patient resting in bed in no acute distress  Reporting that he feels better and wants to go home  Denies feeling confused  Denies diarrhea          ROS: denies fever, chills, cp, sob, n/v, HA unless stated above.       allopurinol  100 mg Oral Daily    aspirin  81 mg Oral Daily    atorvastatin  40 mg Oral Daily    apixaban  5 mg Oral BID    ferrous sulfate  325 mg Oral Daily with breakfast    insulin glargine  55 Units SubCUTAneous Nightly    [Held by provider] insulin lispro  20 Units SubCUTAneous TID AC    levothyroxine  88 mcg Oral Daily    sodium chloride flush  5-40 mL IntraVENous 2 times per day    hydrocortisone sodium succinate PF  100 mg IntraVENous Q8H    midodrine  10 mg Oral TID WC    insulin lispro  0-8 Units SubCUTAneous TID WC    insulin lispro  0-4 Units SubCUTAneous Nightly    magnesium oxide  400 mg Oral Daily    budesonide  0.5 mg Nebulization BID    arformoterol tartrate  15 mcg Nebulization BID    ipratropium-albuterol  1 ampule Inhalation Q4H WA     acetaminophen, 1,000 mg, Daily PRN  sodium chloride flush, 5-40 mL, PRN  sodium chloride, , PRN  polyethylene glycol, 17 g, Daily PRN  acetaminophen, 650 mg, Q6H PRN  glucose, 4 tablet, PRN  dextrose bolus, 125 mL, PRN   Or  dextrose bolus, 250 mL, PRN  glucagon (rDNA), 1 mg, PRN  dextrose, , Continuous PRN  hydrOXYzine pamoate, 25 mg, TID PRN         Objective:    /88   Pulse 76   Temp 97.5 °F (36.4 °C) (Oral)   Resp 20   Ht 5' 7\" (1.702 m)   Wt 237 lb 11.2 oz (107.8 kg)   SpO2 98%   BMI 37.23 kg/m² General Appearance: alert and oriented to person. Confused to year 2020  Skin: warm and dry  Head: normocephalic and atraumatic  Neck: neck supple and non tender without mass   Pulmonary/Chest: clear to auscultation bilaterally-  Cardiovascular: normal rate, normal S1 and S2 and no carotid bruits  Abdomen: soft, non-tender, non-distended, normal bowel sounds  Extremities: no cyanosis, no clubbing and no edema  Neurologic: speech normal         Recent Labs     01/08/23  0117 01/08/23  0535 01/08/23  0814 01/08/23  0820 01/09/23  0219    135  --   --  136   K 5.6* 5.1*  --   --  4.5    101  --   --  104   CO2 19* 21*  --   --  21*   BUN 80* 74*  --   --  56*   CREATININE 3.2* 2.8*  --   --  1.6*   GLUCOSE 211* 247* 157 157 220*   CALCIUM 7.7* 8.5*  --   --  8.5*       Recent Labs     01/07/23  1623 01/08/23  0535 01/09/23  0219   WBC 11.2 9.7 6.7   RBC 4.14 3.72* 3.35*   HGB 12.3* 11.0* 10.0*   HCT 38.4 34.3* 31.0*   MCV 92.8 92.2 92.5   MCH 29.7 29.6 29.9   MCHC 32.0 32.1 32.3   RDW 16.2* 16.2* 16.1*    326 252   MPV 9.9 9.9 9.9         Assessment:    Principal Problem:    Hypovolemia  Active Problems:    Hypotension    Hypovolemic shock (HCC)    RADHA (acute kidney injury) ([facility]  Resolved Problems:    * No resolved hospital problems. *      Plan:  1. Acute metabolic encephalopathy likely related to RADHA/ hypotension : pt presented to the ER with weakness/ difficulty ambulating. Recent diarrhea  He was just discharged from [facility] on 12/30 following ICU admission for hypovolemic/ /acute encephalopathy. Pt reporting compliance with medications and follow up. In ER this admission pt received 4L IVF with ongoing low bp. Started on pressors. Plan was to admit to ICU. However in ER then off pressors and downgraded to telemetry. Note to have confusion in ER. CT head negative in ER. Ammonia level wnl.  UDS + oxycodone which is on home med list. Pt awake and alert- resting in bed in no acute distress. Denies confusion. Unable to recall year. Oriented x2     2. Unspecified shock/ hypotension: septic vs hypovolemia vs cardiogenic: Received aggressive fluid resuscitation. Required pressors for only a few hours in ER then weaned off. Now on telemetry floor. Started on solucortef- will wean. Lisinopril, bumex and metoprolol- home meds on hold. Reporting recent diarrhea early December. Continue midodrine- started this admission. Afebrile. No leukocytosis. Blood/ urine cultures pending. CT abdomen reviewed no acute issue. CXR no pneumonia. BP better. 3. RADHA on CKD with hyperkalemia: creatinine 4.5 on admission. Appreciate nephrology input. Down trending- 1.6 today. Appears on discharge recent admission was 1.5. Holding ace. Avoid nephrotoxic meds. On IVF. 4 Hyponatremia: monitor    5. Diarrhea: CT abdomen with no pathology    6. Elevated troponin: 60- trend. EKG with no ischemia    7. HFpEF: echo reviewed from 8/2022- EF 65% with stage II diastolic dysfunction. Holding bumex. 8. Hypotension with h/o HTN; holding ace/ metoprolol    9. Anemia- iron deficiency component: monitor H &H     10. DM: hga1c 7.2. Continue lantus/ insulin ss    11. Thyroid disease: continue synthroid    12. Chronic atrial fibrillation: holding BB- continue eliquis    13. Restrictive lung disease/ NIKO- wears 2.5L nocturnally. 14. Difficulty ambulating/ deconditioning: PT/OT        Time spent reviewing chart, clinical exam, discussing case and answering questions with staff/consultants/patient/family = 20 minutes      NOTE: This report was transcribed using voice recognition software. Every effort was made to ensure accuracy; however, inadvertent computerized transcription errors may be present.   Electronically signed by WILLIAN Mendoza on 1/9/2023 at 8:54 AM

## 2023-01-09 NOTE — PROGRESS NOTES
Occupational Therapy    OCCUPATIONAL THERAPY INITIAL EVALUATION     Johanne Cueto [facility], [place]         [id number]                                                  Patient Name: Amanda Saeed    MRN: [id number]    : 1955    Room: [address] [place] [address]      Evaluating OT: Stanley Henlye OTR/L   [id number]      Referring Ayana Chowdary MD    Specific Provider Orders/Date:OT eval and treat 2023      Diagnosis:  Hypovolemia [E86.1]  Hypovolemic shock ([facility] [R57.1]  RADHA (acute kidney injury) ([facility] [N17.9]  Hypotension, unspecified hypotension type [I95.9]     Pertinent Medical History: A fib, back surgery,      Precautions:  Fall Risk,      Assessment of current deficits    [x] Functional mobility  [x]ADLs  [x] Strength               []Cognition    [x] Functional transfers   [x] IADLs         [x] Safety Awareness   [x]Endurance    [] Fine Coordination              [x] Balance      [] Vision/perception   []Sensation     []Gross Motor Coordination  [] ROM  [] Delirium                   [] Motor Control     OT PLAN OF CARE   OT POC based on physician orders, patient diagnosis and results of clinical assessment    Frequency/Duration  2-4 days/wk for 2 weeks PRN   Specific OT Treatment Interventions to include:   ADL retraining/adapted techniques and AE recommendations to increase functional independence within precautions                    Energy conservation techniques to improve tolerance for selfcare routine   Functional transfer/mobility training/DME recommendations for increased independence, safety and fall prevention         Patient/family education to increase safety and functional independence             Environmental modifications for safe mobility and completion of ADLs                             Therapeutic activity to improve functional performance during ADLs.                                          Therapeutic exercise to improve tolerance and functional strength for ADLs    Balance retraining/tolerance tasks for facilitation of postural control with dynamic challenges during ADLs . Positioning to improve functional independence  []    Recommended Adaptive Equipment: TBD     Home Living: Pt lives with wife, 2 story with 3 steps to enter. 1st floor set-up     Equipment owned: walker, reacher, soc aide, cane    Prior Level of Function: assist as needed  with ADLs , assist as needed  with IADLs; ambulated with cane    Pain Level: no pain this session ;   Cognition: A&O: 4/4   Memory:  good    Sequencing:  good    Problem solving:  fair+   Judgement/safety:  fair+     Functional Assessment:  AM-PAC Daily Activity Raw Score: 17/24   Initial Eval Status  Date: 1/9/2023 Treatment Status  Date: STGs = LTGs  Time frame: 10-14 days   Feeding Independent      Grooming SBA/set-up   Independent    UB Dressing SBA/set - up   Mod I    LB Dressing Mod A  Patient uses AE at home  Assist as needed from wife  SBA    Bathing Min A   Supervision    Toileting SBA   Independent    Bed Mobility  Min A  Supine to sit   Mod I    Functional Transfers CGA  Sit-stand from bed   Mod I    Functional Mobility CGA/SBA.w/walker   Ambulating in room   Supervision with good tolerance    Balance Sitting:     Static:  Independent     DynamicSBA   Standing: CGA/SBA   Independent/supervision    Activity Tolerance SOB observed   On room air  O2 sats 94-95%  Good  with ADL activity    Visual/  Perceptual Glasses: none by bedside                 Hand Dominance right    AROM (PROM) Strength Additional Info:    RUE  WFL WFL good  and wfl FMC/dexterity noted during ADL tasks       LUE WFL WFL good  and wfl FMC/dexterity noted during ADL tasks       Hearing: WFL   Sensation:  No c/o numbness or tingling  Tone: WFL   Edema: none observed     Comments: Upon arrival patient lying in bed .   At end of session, patient sitting in chair  with call light and phone within reach, all lines and tubes intact. Wife present in room     Overall patient demonstrated  decreased independence and safety during completion of ADL/functional transfer/mobility tasks. Pt would benefit from continued skilled OT to increase safety and independence with completion of ADL/IADL tasks for functional independence and quality of life. Rehab Potential: good for established goals     Patient / Family Goal: return home      Patient and/or family were instructed on functional diagnosis, prognosis/goals and OT plan of care. Demonstrated good  understanding. Eval Complexity: Low    Time In: 1455  Time Out: 1514      Min Units   OT Eval Low 97165 x  1   OT Eval Medium 49357      OT Eval High 48228      OT Re-Eval [id number]       Therapeutic Ex 86563      Therapeutic Activities 27709      ADL/Self Care 47339       Orthotic Management 04331       Manual 83310     Neuro Re-Ed 28248       Non-Billable Time          Evaluation Time additionally includes thorough review of current medical information, gathering information on past medical history/social history and prior level of function, interpretation of standardized testing/informal observation of tasks, assessment of data and development of plan of care and goals.             Gerry Bowden  OTR/L  OT [id number]

## 2023-01-09 NOTE — PROGRESS NOTES
Thank you for the consult. Patient follows as an outpatient with Dr. Jatinder Odom and was seen by their group during his most recent hospitalization. We will change the consult to their group.

## 2023-01-09 NOTE — CONSULTS
Department of Internal Medicine  Nephrology Nurse Practitioner Consult Note      Reason for Consult: RADHA  Requesting Physician:  JACQUELINE Kenyon CNP    CHIEF COMPLAINT:  weakness     History Obtained From:  patient, electronic medical record    HISTORY OF PRESENT ILLNESS:  He reports weakness and shaking x 1 day.      Past Medical History:        Diagnosis Date    RADHA (acute kidney injury) ([facility] 3/10/2022    Atrial fibrillation ([facility]     Carpal tunnel syndrome     Encounter regarding vascular access for dialysis for ESRD ([facility] 3/12/2022    Hyperlipidemia     Hypertension     Hypothyroidism     Lung nodule     Nocturnal hypoxemia due to obesity 8/8/2013    Obesity     NIKO (obstructive sleep apnea) 8/8/2013    [facility]    Osteoarthritis     Pinched nerve     Lumbar    Restrictive lung disease secondary to obesity 7/25/2016    Sinus node dysfunction (HCC)     Type II or unspecified type diabetes mellitus without mention of complication, not stated as uncontrolled      Past Surgical History:        Procedure Laterality Date    BACK SURGERY  2012    [facility]. Pinched nerve in back    BACK SURGERY  05/30/2017    BACK SURGERY N/A 11/8/2022    EXCISON OF SOFT TISSUE NEOPLASM OF UPPER BACK performed by Cassy Watson MD at [address] [facility]  2007    multiple colon polyps    COLONOSCOPY  06/04/2012    COLONOSCOPY    COLONOSCOPY  04/26/2022    polyp; diverticula--sarah    COLONOSCOPY N/A 4/26/2022    COLONOSCOPY POLYPECTOMY HOT BIOPSY (Snare) performed by Cassy Watson MD at [address]      lennie cataracts implant    HERNIA REPAIR      umbilical    PACEMAKER PLACEMENT  10/03/2017    Medtronic dual chamber    Dr. Abigail Good Right      Current Medications:    Current Facility-Administered Medications: hydrocortisone sodium succinate PF (SOLU-CORTEF) injection 100 mg, 100 mg, IntraVENous, Q12H  acetaminophen (TYLENOL) tablet 1,000 mg, 1,000 mg, Oral, Daily PRN  allopurinol (ZYLOPRIM) tablet 100 mg, 100 mg, Oral, Daily  aspirin EC tablet 81 mg, 81 mg, Oral, Daily  atorvastatin (LIPITOR) tablet 40 mg, 40 mg, Oral, Daily  apixaban (ELIQUIS) tablet 5 mg, 5 mg, Oral, BID  ferrous sulfate (IRON 325) tablet 325 mg, 325 mg, Oral, Daily with breakfast  insulin glargine (LANTUS) injection vial 55 Units, 55 Units, SubCUTAneous, Nightly  [Held by provider] insulin lispro (HUMALOG) injection vial 20 Units, 20 Units, SubCUTAneous, TID AC  levothyroxine (SYNTHROID) tablet 88 mcg, 88 mcg, Oral, Daily  sodium chloride flush 0.9 % injection 5-40 mL, 5-40 mL, IntraVENous, 2 times per day  sodium chloride flush 0.9 % injection 5-40 mL, 5-40 mL, IntraVENous, PRN  0.9 % sodium chloride infusion, , IntraVENous, PRN  polyethylene glycol (GLYCOLAX) packet 17 g, 17 g, Oral, Daily PRN  [DISCONTINUED] acetaminophen (TYLENOL) tablet 650 mg, 650 mg, Oral, Q6H PRN **OR** acetaminophen (TYLENOL) suppository 650 mg, 650 mg, Rectal, Q6H PRN  glucose chewable tablet 16 g, 4 tablet, Oral, PRN  dextrose bolus 10% 125 mL, 125 mL, IntraVENous, PRN **OR** dextrose bolus 10% 250 mL, 250 mL, IntraVENous, PRN  glucagon (rDNA) injection 1 mg, 1 mg, SubCUTAneous, PRN  dextrose 10 % infusion, , IntraVENous, Continuous PRN  0.9 % sodium chloride infusion, , IntraVENous, Continuous  midodrine (PROAMATINE) tablet 10 mg, 10 mg, Oral, TID WC  insulin lispro (HUMALOG) injection vial 0-8 Units, 0-8 Units, SubCUTAneous, TID WC  insulin lispro (HUMALOG) injection vial 0-4 Units, 0-4 Units, SubCUTAneous, Nightly  magnesium oxide (MAG-OX) tablet 400 mg, 400 mg, Oral, Daily  budesonide (PULMICORT) nebulizer suspension 500 mcg, 0.5 mg, Nebulization, BID  arformoterol tartrate (BROVANA) nebulizer solution 15 mcg, 15 mcg, Nebulization, BID  ipratropium-albuterol (DUONEB) nebulizer solution 1 ampule, 1 ampule, Inhalation, Q4H WA  hydrOXYzine pamoate (VISTARIL) capsule 25 mg, 25 mg, Oral, TID PRN  Allergies:  Patient has no known allergies. Social History:    TOBACCO:   reports that he quit smoking about 18 years ago. His smoking use included cigarettes. He has a 3.50 pack-year smoking history. He quit smokeless tobacco use about 19 years ago. ETOH:   reports no history of alcohol use.     Family History:       Problem Relation Age of Onset    Cancer Mother         skin    Heart Disease Mother     High Blood Pressure Father     Amyotrophic lateral sclerosis Father     Cancer Brother     High Blood Pressure Brother     Diabetes Brother      REVIEW OF SYSTEMS:    CONSTITUTIONAL:  negative  EYES:  negative  HEENT:  negative  RESPIRATORY:  negative  CARDIOVASCULAR:  negative  GASTROINTESTINAL:  negative  GENITOURINARY:  negative  INTEGUMENT/BREAST:  negative  HEMATOLOGIC/LYMPHATIC:  negative  ALLERGIC/IMMUNOLOGIC:  negative  ENDOCRINE:  negative  MUSCULOSKELETAL:  negative  NEUROLOGICAL:  negative  BEHAVIOR/PSYCH:  negative  PHYSICAL EXAM:      Vitals:    VITALS:  /88   Pulse 76   Temp 97.5 °F (36.4 °C) (Oral)   Resp 20   Ht 5' 7\" (1.702 m)   Wt 237 lb 11.2 oz (107.8 kg)   SpO2 98%   BMI 37.23 kg/m²   24HR BLOOD PRESSURE RANGE:  Systolic (12HAR), DFP:416 , Min:104 , VJC:760  ; Diastolic (75WTM), QSY:49, Min:59, Max:88   24HR INTAKE/OUTPUT:    Intake/Output Summary (Last 24 hours) at 1/9/2023 1022  Last data filed at 1/9/2023 0454  Gross per 24 hour   Intake 590 ml   Output 2950 ml   Net -2360 ml       Constitutional:  A&O, NAD  HEENT:  PERRLA  Respiratory:  CTA  Cardiovascular/Edema:  RRR  Gastrointestinal:  + BS  Neurologic:  no deficit  Skin:  left arm redness  Other:  no edema    DATA:    CBC with Differential:    Lab Results   Component Value Date/Time    WBC 6.7 01/09/2023 02:19 AM    RBC 3.35 01/09/2023 02:19 AM    HGB 10.0 01/09/2023 02:19 AM    HCT 31.0 01/09/2023 02:19 AM     01/09/2023 02:19 AM    MCV 92.5 01/09/2023 02:19 AM    MCH 29.9 01/09/2023 02:19 AM    MCHC 32.3 01/09/2023 02:19 AM    RDW 16.1 01/09/2023 02:19 AM    SEGSPCT 68 04/27/2011 10:30 AM    LYMPHOPCT 9.8 01/09/2023 02:19 AM    MONOPCT 5.1 01/09/2023 02:19 AM    EOSPCT 7 10/05/2010 10:52 AM    BASOPCT 0.3 01/09/2023 02:19 AM    MONOSABS 0.34 01/09/2023 02:19 AM    LYMPHSABS 0.66 01/09/2023 02:19 AM    EOSABS 0.01 01/09/2023 02:19 AM    BASOSABS 0.02 01/09/2023 02:19 AM     CMP:    Lab Results   Component Value Date/Time     01/09/2023 02:19 AM    K 4.5 01/09/2023 02:19 AM    K 5.1 01/08/2023 05:35 AM     01/09/2023 02:19 AM    CO2 21 01/09/2023 02:19 AM    BUN 56 01/09/2023 02:19 AM    CREATININE 1.6 01/09/2023 02:19 AM    GFRAA >60 10/03/2022 10:55 AM    LABGLOM 46 01/09/2023 02:19 AM    GLUCOSE 220 01/09/2023 02:19 AM    GLUCOSE 133 04/18/2012 08:43 AM    PROT 6.7 01/08/2023 05:35 AM    LABALBU 3.4 01/08/2023 05:35 AM    LABALBU 4.4 10/25/2011 09:50 AM    CALCIUM 8.5 01/09/2023 02:19 AM    BILITOT 0.4 01/08/2023 05:35 AM    ALKPHOS 114 01/08/2023 05:35 AM    AST 16 01/08/2023 05:35 AM    ALT 29 01/08/2023 05:35 AM     Magnesium:    Lab Results   Component Value Date/Time    MG 2.2 01/09/2023 02:19 AM     Phosphorus:    Lab Results   Component Value Date/Time    PHOS 4.0 01/09/2023 02:19 AM     Radiology Review:      CXR 1/1/23   Right IJ central line tip localized at proximal SVC. Mild cardiomegaly. IMPRESSION/RECOMMENDATIONS:      RADHA, stage 1, likely prerenal RADHA 2/2 intravascular volume depletion from poor oral intake and overdiuresis in the setting of ACE inhibition, Prince catheter in place. Creatinine level peaked at 4.5, has improved to 1.6 mg/dL with IVF administration. CKD stage IIIb likely 2/2 nephrosclerosis and previous episodes of ischemic ATN requiring HD x2 separate occasions.   Baseline creatinine 1.4 to 1.5 mg/dL     HTN, on metoprolol, lisinopril on hold  Type II DM, on jardiance  Hypothyroidism on levothyroxine  NIKO  HFpEF 55 to 60%, bumex on hold        Plan:     Continue IVF  Hold lisinopril and bumex  Strict intake and output      MD: Reviewed. Agree with plan.

## 2023-01-10 ENCOUNTER — APPOINTMENT (OUTPATIENT)
Dept: GENERAL RADIOLOGY | Age: 68
DRG: 682 | End: 2023-01-10
Payer: MEDICARE

## 2023-01-10 LAB
ANION GAP SERPL CALCULATED.3IONS-SCNC: 15 MMOL/L (ref 7–16)
BASOPHILS ABSOLUTE: 0.14 E9/L (ref 0–0.2)
BASOPHILS RELATIVE PERCENT: 0.8 % (ref 0–2)
BUN BLDV-MCNC: 48 MG/DL (ref 6–23)
CALCIUM SERPL-MCNC: 8.9 MG/DL (ref 8.6–10.2)
CHLORIDE BLD-SCNC: 102 MMOL/L (ref 98–107)
CO2: 17 MMOL/L (ref 22–29)
CREAT SERPL-MCNC: 1.3 MG/DL (ref 0.7–1.2)
EOSINOPHILS ABSOLUTE: 0.08 E9/L (ref 0.05–0.5)
EOSINOPHILS RELATIVE PERCENT: 0.5 % (ref 0–6)
GFR SERPL CREATININE-BSD FRML MDRD: 59 ML/MIN/1.73
GLUCOSE BLD-MCNC: 250 MG/DL (ref 74–99)
HCT VFR BLD CALC: 38.4 % (ref 37–54)
HEMOGLOBIN: 12.4 G/DL (ref 12.5–16.5)
IMMATURE GRANULOCYTES #: 0.28 E9/L
IMMATURE GRANULOCYTES %: 1.7 % (ref 0–5)
LYMPHOCYTES ABSOLUTE: 2.82 E9/L (ref 1.5–4)
LYMPHOCYTES RELATIVE PERCENT: 17 % (ref 20–42)
MAGNESIUM: 2.3 MG/DL (ref 1.6–2.6)
MCH RBC QN AUTO: 29.4 PG (ref 26–35)
MCHC RBC AUTO-ENTMCNC: 32.3 % (ref 32–34.5)
MCV RBC AUTO: 91 FL (ref 80–99.9)
METER GLUCOSE: 226 MG/DL (ref 74–99)
METER GLUCOSE: 234 MG/DL (ref 74–99)
METER GLUCOSE: 240 MG/DL (ref 74–99)
METER GLUCOSE: 241 MG/DL (ref 74–99)
MONOCYTES ABSOLUTE: 1.44 E9/L (ref 0.1–0.95)
MONOCYTES RELATIVE PERCENT: 8.7 % (ref 2–12)
NEUTROPHILS ABSOLUTE: 11.83 E9/L (ref 1.8–7.3)
NEUTROPHILS RELATIVE PERCENT: 71.3 % (ref 43–80)
PDW BLD-RTO: 16.7 FL (ref 11.5–15)
PHOSPHORUS: 3.7 MG/DL (ref 2.5–4.5)
PLATELET # BLD: 442 E9/L (ref 130–450)
PMV BLD AUTO: 10.2 FL (ref 7–12)
POTASSIUM SERPL-SCNC: 4.9 MMOL/L (ref 3.5–5)
POTASSIUM SERPL-SCNC: 5.6 MMOL/L (ref 3.5–5)
PRO-BNP: ABNORMAL PG/ML (ref 0–125)
RBC # BLD: 4.22 E12/L (ref 3.8–5.8)
SODIUM BLD-SCNC: 134 MMOL/L (ref 132–146)
URINE CULTURE, ROUTINE: NORMAL
WBC # BLD: 16.6 E9/L (ref 4.5–11.5)

## 2023-01-10 PROCEDURE — 6360000002 HC RX W HCPCS: Performed by: STUDENT IN AN ORGANIZED HEALTH CARE EDUCATION/TRAINING PROGRAM

## 2023-01-10 PROCEDURE — 85025 COMPLETE CBC W/AUTO DIFF WBC: CPT

## 2023-01-10 PROCEDURE — 6370000000 HC RX 637 (ALT 250 FOR IP): Performed by: INTERNAL MEDICINE

## 2023-01-10 PROCEDURE — 83880 ASSAY OF NATRIURETIC PEPTIDE: CPT

## 2023-01-10 PROCEDURE — 80048 BASIC METABOLIC PNL TOTAL CA: CPT

## 2023-01-10 PROCEDURE — 83735 ASSAY OF MAGNESIUM: CPT

## 2023-01-10 PROCEDURE — 71045 X-RAY EXAM CHEST 1 VIEW: CPT

## 2023-01-10 PROCEDURE — 2060000000 HC ICU INTERMEDIATE R&B

## 2023-01-10 PROCEDURE — 6370000000 HC RX 637 (ALT 250 FOR IP)

## 2023-01-10 PROCEDURE — 6360000002 HC RX W HCPCS: Performed by: NURSE PRACTITIONER

## 2023-01-10 PROCEDURE — 2580000003 HC RX 258: Performed by: STUDENT IN AN ORGANIZED HEALTH CARE EDUCATION/TRAINING PROGRAM

## 2023-01-10 PROCEDURE — 82962 GLUCOSE BLOOD TEST: CPT

## 2023-01-10 PROCEDURE — 84100 ASSAY OF PHOSPHORUS: CPT

## 2023-01-10 PROCEDURE — 84132 ASSAY OF SERUM POTASSIUM: CPT

## 2023-01-10 PROCEDURE — 6360000002 HC RX W HCPCS: Performed by: FAMILY MEDICINE

## 2023-01-10 PROCEDURE — 6370000000 HC RX 637 (ALT 250 FOR IP): Performed by: STUDENT IN AN ORGANIZED HEALTH CARE EDUCATION/TRAINING PROGRAM

## 2023-01-10 PROCEDURE — 36415 COLL VENOUS BLD VENIPUNCTURE: CPT

## 2023-01-10 PROCEDURE — 99232 SBSQ HOSP IP/OBS MODERATE 35: CPT | Performed by: NURSE PRACTITIONER

## 2023-01-10 PROCEDURE — 94640 AIRWAY INHALATION TREATMENT: CPT

## 2023-01-10 RX ORDER — MIDODRINE HYDROCHLORIDE 5 MG/1
5 TABLET ORAL
Status: DISCONTINUED | OUTPATIENT
Start: 2023-01-10 | End: 2023-01-11 | Stop reason: HOSPADM

## 2023-01-10 RX ORDER — BUMETANIDE 1 MG/1
2 TABLET ORAL DAILY
Status: DISCONTINUED | OUTPATIENT
Start: 2023-01-11 | End: 2023-01-11 | Stop reason: HOSPADM

## 2023-01-10 RX ORDER — MIDODRINE HYDROCHLORIDE 10 MG/1
10 TABLET ORAL
Qty: 90 TABLET | Refills: 0 | Status: CANCELLED | OUTPATIENT
Start: 2023-01-10

## 2023-01-10 RX ORDER — BUMETANIDE 0.25 MG/ML
1 INJECTION, SOLUTION INTRAMUSCULAR; INTRAVENOUS ONCE
Status: COMPLETED | OUTPATIENT
Start: 2023-01-10 | End: 2023-01-10

## 2023-01-10 RX ADMIN — Medication 400 MG: at 10:11

## 2023-01-10 RX ADMIN — BUDESONIDE 500 MCG: 0.5 SUSPENSION RESPIRATORY (INHALATION) at 12:16

## 2023-01-10 RX ADMIN — IPRATROPIUM BROMIDE AND ALBUTEROL SULFATE 1 AMPULE: .5; 2.5 SOLUTION RESPIRATORY (INHALATION) at 15:19

## 2023-01-10 RX ADMIN — TRAMADOL HYDROCHLORIDE 50 MG: 50 TABLET, COATED ORAL at 21:38

## 2023-01-10 RX ADMIN — FERROUS SULFATE TAB 325 MG (65 MG ELEMENTAL FE) 325 MG: 325 (65 FE) TAB at 10:11

## 2023-01-10 RX ADMIN — APIXABAN 5 MG: 5 TABLET, FILM COATED ORAL at 21:37

## 2023-01-10 RX ADMIN — BUDESONIDE 500 MCG: 0.5 SUSPENSION RESPIRATORY (INHALATION) at 19:27

## 2023-01-10 RX ADMIN — INSULIN LISPRO 2 UNITS: 100 INJECTION, SOLUTION INTRAVENOUS; SUBCUTANEOUS at 11:44

## 2023-01-10 RX ADMIN — SODIUM CHLORIDE, PRESERVATIVE FREE 10 ML: 5 INJECTION INTRAVENOUS at 21:37

## 2023-01-10 RX ADMIN — ARFORMOTEROL TARTRATE 15 MCG: 15 SOLUTION RESPIRATORY (INHALATION) at 12:16

## 2023-01-10 RX ADMIN — HYDROCORTISONE SODIUM SUCCINATE 100 MG: 100 INJECTION, POWDER, FOR SOLUTION INTRAMUSCULAR; INTRAVENOUS at 06:53

## 2023-01-10 RX ADMIN — IPRATROPIUM BROMIDE AND ALBUTEROL SULFATE 1 AMPULE: .5; 2.5 SOLUTION RESPIRATORY (INHALATION) at 19:27

## 2023-01-10 RX ADMIN — HYDROCORTISONE SODIUM SUCCINATE 50 MG: 100 INJECTION, POWDER, FOR SOLUTION INTRAMUSCULAR; INTRAVENOUS at 17:36

## 2023-01-10 RX ADMIN — APIXABAN 5 MG: 5 TABLET, FILM COATED ORAL at 10:10

## 2023-01-10 RX ADMIN — INSULIN LISPRO 2 UNITS: 100 INJECTION, SOLUTION INTRAVENOUS; SUBCUTANEOUS at 06:58

## 2023-01-10 RX ADMIN — TRAMADOL HYDROCHLORIDE 50 MG: 50 TABLET, COATED ORAL at 10:10

## 2023-01-10 RX ADMIN — ALLOPURINOL 100 MG: 100 TABLET ORAL at 10:11

## 2023-01-10 RX ADMIN — ASPIRIN 81 MG: 81 TABLET, COATED ORAL at 10:10

## 2023-01-10 RX ADMIN — ATORVASTATIN CALCIUM 40 MG: 40 TABLET, FILM COATED ORAL at 10:10

## 2023-01-10 RX ADMIN — IPRATROPIUM BROMIDE AND ALBUTEROL SULFATE 1 AMPULE: .5; 2.5 SOLUTION RESPIRATORY (INHALATION) at 12:15

## 2023-01-10 RX ADMIN — BUMETANIDE 1 MG: 0.25 INJECTION, SOLUTION INTRAMUSCULAR; INTRAVENOUS at 15:52

## 2023-01-10 RX ADMIN — INSULIN LISPRO 2 UNITS: 100 INJECTION, SOLUTION INTRAVENOUS; SUBCUTANEOUS at 17:36

## 2023-01-10 RX ADMIN — ARFORMOTEROL TARTRATE 15 MCG: 15 SOLUTION RESPIRATORY (INHALATION) at 19:27

## 2023-01-10 RX ADMIN — INSULIN GLARGINE 55 UNITS: 100 INJECTION, SOLUTION SUBCUTANEOUS at 21:38

## 2023-01-10 ASSESSMENT — PAIN SCALES - GENERAL: PAINLEVEL_OUTOF10: 5

## 2023-01-10 ASSESSMENT — PAIN DESCRIPTION - LOCATION: LOCATION: BACK

## 2023-01-10 NOTE — CARE COORDINATION
Transition of Care-Patient continues on IV hydration and IV steroids   Q 12. Spoke with wife yesterday-she is adamant that patient return home at discharge. PT/OT ordered. Anticipate discharge home-no anticipated needs in the next 24-48 hrs.     Checo FLORESN, RN  [facility]

## 2023-01-10 NOTE — PROGRESS NOTES
[facility] Progress Note    Admitting Date and Time: 1/7/2023  3:10 PM  Admit Dx: Hypovolemia [E86.1]  Hypovolemic shock (HCC) [R57.1]  RADHA (acute kidney injury) ([facility] [address] [N17.9]  Hypotension, unspecified hypotension type [I95.9]    Subjective:  Patient is being followed for Hypovolemia [E86.1]  Hypovolemic shock ([facility] [address] [R57.1]  RADHA (acute kidney injury) ([facility] [address] [N17.9]  Hypotension, unspecified hypotension type [I95.9]     Received call from nursing this am that pt was c/o dyspnea  CXR ordered    Pt seen awake and alert-- reporting he slid down in bed and was attempting to scoot himself up  Assisted nursing in pulling him up in bed  Reporting he does feel sob today  Respiratory therapy happened to be walking by room and reporting that he had refused breathing treatment/ steroid nebulizer this morning and would give to him now    ROS: denies fever, chills, cp, sob, n/v, HA unless stated above.      hydrocortisone sodium succinate PF  100 mg IntraVENous Q12H    allopurinol  100 mg Oral Daily    aspirin  81 mg Oral Daily    atorvastatin  40 mg Oral Daily    apixaban  5 mg Oral BID    ferrous sulfate  325 mg Oral Daily with breakfast    insulin glargine  55 Units SubCUTAneous Nightly    [Held by provider] insulin lispro  20 Units SubCUTAneous TID AC    levothyroxine  88 mcg Oral Daily    sodium chloride flush  5-40 mL IntraVENous 2 times per day    midodrine  10 mg Oral TID WC    insulin lispro  0-8 Units SubCUTAneous TID WC    insulin lispro  0-4 Units SubCUTAneous Nightly    magnesium oxide  400 mg Oral Daily    budesonide  0.5 mg Nebulization BID    arformoterol tartrate  15 mcg Nebulization BID    ipratropium-albuterol  1 ampule Inhalation Q4H WA     traMADol, 50 mg, Q6H PRN  acetaminophen, 1,000 mg, Daily PRN  sodium chloride flush, 5-40 mL, PRN  sodium chloride, , PRN  polyethylene glycol, 17 g, Daily PRN  acetaminophen, 650 mg, Q6H PRN  glucose, 4 tablet, PRN  dextrose bolus, 125 mL, PRN Or  dextrose bolus, 250 mL, PRN  glucagon (rDNA), 1 mg, PRN  dextrose, , Continuous PRN  hydrOXYzine pamoate, 25 mg, TID PRN         Objective:    BP (!) 142/95   Pulse 87   Temp 98 °F (36.7 °C) (Axillary)   Resp 20   Ht 5' 7\" (1.702 m)   Wt 237 lb 12.8 oz (107.9 kg)   SpO2 96%   BMI 37.24 kg/m²   General Appearance: alert and oriented to person, place and time and in no acute distress- does appear mildly dyspneic on exam.  Skin: warm and dry  Head: normocephalic and atraumatic  Eyes: pupils equal, round, and reactive to light, extraocular eye movements intact, conjunctivae normal  Neck: neck supple and non tender without mass   Pulmonary/Chest: diminished throughout   Cardiovascular: normal rate, normal S1 and S2 and no carotid bruits  Abdomen: soft, non-tender, non-distended, normal bowel sounds, no masses or organomegaly  Extremities: no cyanosis, no clubbing and no edema  Neurologic: speech normal         Recent Labs     01/08/23  0535 01/08/23  0814 01/08/23  0820 01/09/23  0219 01/10/23  0405     --   --  136 134   K 5.1*  --   --  4.5 5.6*     --   --  104 102   CO2 21*  --   --  21* 17*   BUN 74*  --   --  56* 48*   CREATININE 2.8*  --   --  1.6* 1.3*   GLUCOSE 247*   < > 157 220* 250*   CALCIUM 8.5*  --   --  8.5* 8.9    < > = values in this interval not displayed. Recent Labs     01/08/23 0535 01/09/23  0219 01/10/23  0405   WBC 9.7 6.7 16.6*   RBC 3.72* 3.35* 4.22   HGB 11.0* 10.0* 12.4*   HCT 34.3* 31.0* 38.4   MCV 92.2 92.5 91.0   MCH 29.6 29.9 29.4   MCHC 32.1 32.3 32.3   RDW 16.2* 16.1* 16.7*    252 442   MPV 9.9 9.9 10.2           Assessment:    Principal Problem:    Hypovolemia  Active Problems:    Hypotension    Hypovolemic shock (HCC)    Acute metabolic encephalopathy    RADHA (acute kidney injury) ([facility] [address]  Resolved Problems:    * No resolved hospital problems. *      Plan:  1.   Acute metabolic encephalopathy likely related to RAHDA/ hypotension : pt presented to the ER with weakness/ difficulty ambulating. Recent diarrhea  He was just discharged from [facility] on 12/30 following ICU admission for hypovolemic/ /acute encephalopathy. Pt reporting compliance with medications and follow up. In ER this admission pt received 4L IVF with ongoing low bp. Started on pressors. Plan was to admit to ICU. However in ER then off pressors and downgraded to telemetry. Note to have confusion in ER. CT head negative in ER. Ammonia level wnl. UDS + oxycodone which is on home med list. Pt awake and alert- resting in bed. Appears mildly dyspneic. Apparently he refused his breathing treatments this morning. RT on floor and giving them to him now. CXR ordered and does not show fluid overload. Can check probnp. IVF on hold. He has been off of his diuretics. Wean midodrine- bp trending up. Monitor volume status closely. 2. Unspecified shock/ hypotension: septic vs hypovolemia vs cardiogenic: Received aggressive fluid resuscitation. Required pressors for only a few hours in ER then weaned off. Now on telemetry floor. Started on solucortef- will wean. Lisinopril, bumex and metoprolol- home meds on hold. Reporting recent diarrhea early December. Continue midodrine- started this admission. Afebrile. No leukocytosis. Blood/ urine cultures pending. CT abdomen reviewed no acute issue. CXR no pneumonia. BP better. Wean midodrine / solucortef. 3. RADHA on CKD with hyperkalemia: creatinine 4.5 on admission. Appreciate nephrology input. Down trending- 1.6 today. Appears on discharge recent admission was 1.5. Holding ace. Avoid nephrotoxic meds. IVF on hold. 4 Hyponatremia: monitor     5. Diarrhea: CT abdomen with no pathology     6. Elevated troponin: 60- trend. EKG with no ischemia     7. HFpEF: echo reviewed from 8/2022- EF 65% with stage II diastolic dysfunction. Holding bumex. Check pro bnp. CXR. 8. Hypotension with h/o HTN; holding ace/ metoprolol     9.  Anemia- iron deficiency component: monitor H &H      10. DM: hga1c 7.2. Continue lantus/ insulin ss     11. Thyroid disease: continue synthroid     12. Chronic atrial fibrillation: holding BB- continue eliquis     13. Restrictive lung disease/ NIKO- wears 2.5L- 3L nocturnally. Appears pt is on his baseline 02.      14. Difficulty ambulating/ deconditioning: PT/OT     15. + Blood culture. Coag neg staph 1/2 bottles/ Likely due to contamination. Dispo: possible dc in 24 - 48 hours. Wean solucortef today. Time spent reviewing chart, clinical exam, discussing case and answering questions with staff/consultants/patient/family = 20 minutes         NOTE: This report was transcribed using voice recognition software. Every effort was made to ensure accuracy; however, inadvertent computerized transcription errors may be present.   Electronically signed by WILLIAN Spring on 1/10/2023 at 9:52 AM

## 2023-01-10 NOTE — PROGRESS NOTES
Department of Internal Medicine  Nephrology Progress Note      Reason for Consult: RADHA  Requesting Physician:  JACQUELINE Gaston CNP    CHIEF COMPLAINT:  weakness     History Obtained From:  patient, electronic medical record    HISTORY OF PRESENT ILLNESS:  He reports weakness and shaking x 1 day.      Past Medical History:        Diagnosis Date    RADHA (acute kidney injury) ([facility] [address] 3/10/2022    Atrial fibrillation ([facility] [address]     Carpal tunnel syndrome     Encounter regarding vascular access for dialysis for ESRD ([facility] [address] 3/12/2022    Hyperlipidemia     Hypertension     Hypothyroidism     Lung nodule     Nocturnal hypoxemia due to obesity 8/8/2013    Obesity     NIKO (obstructive sleep apnea) 8/8/2013    [facility]    Osteoarthritis     Pinched nerve     Lumbar    Restrictive lung disease secondary to obesity 7/25/2016    Sinus node dysfunction (HCC)     Type II or unspecified type diabetes mellitus without mention of complication, not stated as uncontrolled      Past Surgical History:        Procedure Laterality Date    BACK SURGERY  2012    [facility]. Pinched nerve in back    BACK SURGERY  05/30/2017    BACK SURGERY N/A 11/8/2022    EXCISON OF SOFT TISSUE NEOPLASM OF UPPER BACK performed by Wilfredo Rivera MD at [address] [address]    multiple colon polyps    COLONOSCOPY  06/04/2012    COLONOSCOPY    COLONOSCOPY  04/26/2022    polyp; diverticula--sarah    COLONOSCOPY N/A 4/26/2022    COLONOSCOPY POLYPECTOMY HOT BIOPSY (Snare) performed by Wilfredo Rivera MD at [address]      lennie cataracts implant    HERNIA REPAIR      umbilical    PACEMAKER PLACEMENT  10/03/2017    Medtronic dual chamber    Dr. Drake Barr Right      Current Medications:    Current Facility-Administered Medications: hydrocortisone sodium succinate PF (SOLU-CORTEF) injection 50 mg, 50 mg, IntraVENous, Q12H  midodrine (PROAMATINE) tablet 5 mg, 5 mg, Oral, TID WC  traMADol (ULTRAM) tablet 50 mg, 50 mg, Oral, Q6H PRN  acetaminophen (TYLENOL) tablet 1,000 mg, 1,000 mg, Oral, Daily PRN  allopurinol (ZYLOPRIM) tablet 100 mg, 100 mg, Oral, Daily  aspirin EC tablet 81 mg, 81 mg, Oral, Daily  atorvastatin (LIPITOR) tablet 40 mg, 40 mg, Oral, Daily  apixaban (ELIQUIS) tablet 5 mg, 5 mg, Oral, BID  ferrous sulfate (IRON 325) tablet 325 mg, 325 mg, Oral, Daily with breakfast  insulin glargine (LANTUS) injection vial 55 Units, 55 Units, SubCUTAneous, Nightly  [Held by provider] insulin lispro (HUMALOG) injection vial 20 Units, 20 Units, SubCUTAneous, TID AC  levothyroxine (SYNTHROID) tablet 88 mcg, 88 mcg, Oral, Daily  sodium chloride flush 0.9 % injection 5-40 mL, 5-40 mL, IntraVENous, 2 times per day  sodium chloride flush 0.9 % injection 5-40 mL, 5-40 mL, IntraVENous, PRN  0.9 % sodium chloride infusion, , IntraVENous, PRN  polyethylene glycol (GLYCOLAX) packet 17 g, 17 g, Oral, Daily PRN  [DISCONTINUED] acetaminophen (TYLENOL) tablet 650 mg, 650 mg, Oral, Q6H PRN **OR** acetaminophen (TYLENOL) suppository 650 mg, 650 mg, Rectal, Q6H PRN  glucose chewable tablet 16 g, 4 tablet, Oral, PRN  dextrose bolus 10% 125 mL, 125 mL, IntraVENous, PRN **OR** dextrose bolus 10% 250 mL, 250 mL, IntraVENous, PRN  glucagon (rDNA) injection 1 mg, 1 mg, SubCUTAneous, PRN  dextrose 10 % infusion, , IntraVENous, Continuous PRN  0.9 % sodium chloride infusion, , IntraVENous, Continuous  insulin lispro (HUMALOG) injection vial 0-8 Units, 0-8 Units, SubCUTAneous, TID WC  insulin lispro (HUMALOG) injection vial 0-4 Units, 0-4 Units, SubCUTAneous, Nightly  magnesium oxide (MAG-OX) tablet 400 mg, 400 mg, Oral, Daily  budesonide (PULMICORT) nebulizer suspension 500 mcg, 0.5 mg, Nebulization, BID  arformoterol tartrate (BROVANA) nebulizer solution 15 mcg, 15 mcg, Nebulization, BID  ipratropium-albuterol (DUONEB) nebulizer solution 1 ampule, 1 ampule, Inhalation, Q4H WA  hydrOXYzine pamoate (VISTARIL) capsule 25 mg, 25 mg, Oral, TID PRN  Allergies:  Patient has no known allergies. Social History:    TOBACCO:   reports that he quit smoking about 18 years ago. His smoking use included cigarettes. He has a 3.50 pack-year smoking history. He quit smokeless tobacco use about 19 years ago. ETOH:   reports no history of alcohol use.     Family History:       Problem Relation Age of Onset    Cancer Mother         skin    Heart Disease Mother     High Blood Pressure Father     Amyotrophic lateral sclerosis Father     Cancer Brother     High Blood Pressure Brother     Diabetes Brother      REVIEW OF SYSTEMS:    CONSTITUTIONAL:  negative  EYES:  negative  HEENT:  negative  RESPIRATORY:  negative  CARDIOVASCULAR:  negative  GASTROINTESTINAL:  negative  GENITOURINARY:  negative  INTEGUMENT/BREAST:  negative  HEMATOLOGIC/LYMPHATIC:  negative  ALLERGIC/IMMUNOLOGIC:  negative  ENDOCRINE:  negative  MUSCULOSKELETAL:  negative  NEUROLOGICAL:  negative  BEHAVIOR/PSYCH:  negative  PHYSICAL EXAM:      Vitals:    VITALS:  BP (!) 142/95   Pulse 87   Temp 98 °F (36.7 °C) (Axillary)   Resp 20   Ht 5' 7\" (1.702 m)   Wt 237 lb 12.8 oz (107.9 kg)   SpO2 96%   BMI 37.24 kg/m²   24HR BLOOD PRESSURE RANGE:  Systolic (63OXI), YXC:516 , Min:128 , BPY:807 ; Diastolic (56UVL), VCH:36, Min:84, Max:95  24HR INTAKE/OUTPUT:    Intake/Output Summary (Last 24 hours) at 1/10/2023 1431  Last data filed at 1/10/2023 1211  Gross per 24 hour   Intake 120 ml   Output --   Net 120 ml         Constitutional:  A&O, NAD  HEENT:  PERRLA  Respiratory:  CTA  Cardiovascular/Edema:  RRR  Gastrointestinal:  + BS  Neurologic:  no deficit  Skin:  left arm redness  Other:  no edema    DATA:    CBC with Differential:    Lab Results   Component Value Date/Time    WBC 16.6 01/10/2023 04:05 AM    RBC 4.22 01/10/2023 04:05 AM    HGB 12.4 01/10/2023 04:05 AM    HCT 38.4 01/10/2023 04:05 AM     01/10/2023 04:05 AM    MCV 91.0 01/10/2023 04:05 AM    MCH 29.4 01/10/2023 04:05 AM    MCHC 32.3 01/10/2023 04:05 AM    RDW 16.7 01/10/2023 04:05 AM    SEGSPCT 68 04/27/2011 10:30 AM    LYMPHOPCT 17.0 01/10/2023 04:05 AM    MONOPCT 8.7 01/10/2023 04:05 AM    EOSPCT 7 10/05/2010 10:52 AM    BASOPCT 0.8 01/10/2023 04:05 AM    MONOSABS 1.44 01/10/2023 04:05 AM    LYMPHSABS 2.82 01/10/2023 04:05 AM    EOSABS 0.08 01/10/2023 04:05 AM    BASOSABS 0.14 01/10/2023 04:05 AM     CMP:    Lab Results   Component Value Date/Time     01/10/2023 04:05 AM    K 4.9 01/10/2023 10:55 AM    K 5.1 01/08/2023 05:35 AM     01/10/2023 04:05 AM    CO2 17 01/10/2023 04:05 AM    BUN 48 01/10/2023 04:05 AM    CREATININE 1.3 01/10/2023 04:05 AM    GFRAA >60 10/03/2022 10:55 AM    LABGLOM 59 01/10/2023 04:05 AM    GLUCOSE 250 01/10/2023 04:05 AM    GLUCOSE 133 04/18/2012 08:43 AM    PROT 6.7 01/08/2023 05:35 AM    LABALBU 3.4 01/08/2023 05:35 AM    LABALBU 4.4 10/25/2011 09:50 AM    CALCIUM 8.9 01/10/2023 04:05 AM    BILITOT 0.4 01/08/2023 05:35 AM    ALKPHOS 114 01/08/2023 05:35 AM    AST 16 01/08/2023 05:35 AM    ALT 29 01/08/2023 05:35 AM     Magnesium:    Lab Results   Component Value Date/Time    MG 2.3 01/10/2023 04:05 AM     Phosphorus:    Lab Results   Component Value Date/Time    PHOS 3.7 01/10/2023 04:05 AM     Radiology Review:      CXR 1/1/23   Right IJ central line tip localized at proximal SVC. Mild cardiomegaly. IMPRESSION/RECOMMENDATIONS:      RADHA, stage 1, likely prerenal RADHA 2/2 intravascular volume depletion from poor oral intake and overdiuresis in the setting of ACE inhibition, Prince catheter in place. Creatinine level peaked at 4.5, has improved to 1.6 mg/dL with IVF administration. CKD stage IIIb likely 2/2 nephrosclerosis and previous episodes of ischemic ATN requiring HD x2 separate occasions.   Baseline creatinine 1.4 to 1.5 mg/dL     HTN, on metoprolol, lisinopril on hold  Type II DM, on jardiance  Hypothyroidism on levothyroxine  NIKO  HFpEF 55 to 60%, bumex on hold        Plan: DC IVF  IV bumex today, restart PO bumex tomorrow  Hold lisinopril   Strict intake and output

## 2023-01-11 VITALS
HEIGHT: 67 IN | WEIGHT: 240.7 LBS | HEART RATE: 86 BPM | TEMPERATURE: 97.7 F | OXYGEN SATURATION: 96 % | BODY MASS INDEX: 37.78 KG/M2 | SYSTOLIC BLOOD PRESSURE: 109 MMHG | DIASTOLIC BLOOD PRESSURE: 77 MMHG | RESPIRATION RATE: 20 BRPM

## 2023-01-11 LAB
ANION GAP SERPL CALCULATED.3IONS-SCNC: 12 MMOL/L (ref 7–16)
BASOPHILS ABSOLUTE: 0.1 E9/L (ref 0–0.2)
BASOPHILS RELATIVE PERCENT: 0.8 % (ref 0–2)
BUN BLDV-MCNC: 44 MG/DL (ref 6–23)
CALCIUM SERPL-MCNC: 9.1 MG/DL (ref 8.6–10.2)
CHLORIDE BLD-SCNC: 100 MMOL/L (ref 98–107)
CO2: 21 MMOL/L (ref 22–29)
CREAT SERPL-MCNC: 1.5 MG/DL (ref 0.7–1.2)
EOSINOPHILS ABSOLUTE: 0.15 E9/L (ref 0.05–0.5)
EOSINOPHILS RELATIVE PERCENT: 1.2 % (ref 0–6)
GFR SERPL CREATININE-BSD FRML MDRD: 50 ML/MIN/1.73
GLUCOSE BLD-MCNC: 194 MG/DL (ref 74–99)
HCT VFR BLD CALC: 36 % (ref 37–54)
HEMOGLOBIN: 11.3 G/DL (ref 12.5–16.5)
IMMATURE GRANULOCYTES #: 0.37 E9/L
IMMATURE GRANULOCYTES %: 3 % (ref 0–5)
LYMPHOCYTES ABSOLUTE: 2.76 E9/L (ref 1.5–4)
LYMPHOCYTES RELATIVE PERCENT: 22.4 % (ref 20–42)
MAGNESIUM: 2.2 MG/DL (ref 1.6–2.6)
MCH RBC QN AUTO: 28.8 PG (ref 26–35)
MCHC RBC AUTO-ENTMCNC: 31.4 % (ref 32–34.5)
MCV RBC AUTO: 91.8 FL (ref 80–99.9)
METER GLUCOSE: 175 MG/DL (ref 74–99)
METER GLUCOSE: 188 MG/DL (ref 74–99)
MONOCYTES ABSOLUTE: 1.08 E9/L (ref 0.1–0.95)
MONOCYTES RELATIVE PERCENT: 8.8 % (ref 2–12)
NEUTROPHILS ABSOLUTE: 7.87 E9/L (ref 1.8–7.3)
NEUTROPHILS RELATIVE PERCENT: 63.8 % (ref 43–80)
PDW BLD-RTO: 16.5 FL (ref 11.5–15)
PHOSPHORUS: 3.8 MG/DL (ref 2.5–4.5)
PLATELET # BLD: 380 E9/L (ref 130–450)
PMV BLD AUTO: 9.9 FL (ref 7–12)
POTASSIUM SERPL-SCNC: 4.8 MMOL/L (ref 3.5–5)
RBC # BLD: 3.92 E12/L (ref 3.8–5.8)
SODIUM BLD-SCNC: 133 MMOL/L (ref 132–146)
WBC # BLD: 12.3 E9/L (ref 4.5–11.5)

## 2023-01-11 PROCEDURE — 80048 BASIC METABOLIC PNL TOTAL CA: CPT

## 2023-01-11 PROCEDURE — 6370000000 HC RX 637 (ALT 250 FOR IP): Performed by: NURSE PRACTITIONER

## 2023-01-11 PROCEDURE — 6370000000 HC RX 637 (ALT 250 FOR IP): Performed by: STUDENT IN AN ORGANIZED HEALTH CARE EDUCATION/TRAINING PROGRAM

## 2023-01-11 PROCEDURE — 2700000000 HC OXYGEN THERAPY PER DAY

## 2023-01-11 PROCEDURE — 82962 GLUCOSE BLOOD TEST: CPT

## 2023-01-11 PROCEDURE — 6370000000 HC RX 637 (ALT 250 FOR IP)

## 2023-01-11 PROCEDURE — 6370000000 HC RX 637 (ALT 250 FOR IP): Performed by: INTERNAL MEDICINE

## 2023-01-11 PROCEDURE — 36415 COLL VENOUS BLD VENIPUNCTURE: CPT

## 2023-01-11 PROCEDURE — 99239 HOSP IP/OBS DSCHRG MGMT >30: CPT | Performed by: NURSE PRACTITIONER

## 2023-01-11 PROCEDURE — 94640 AIRWAY INHALATION TREATMENT: CPT

## 2023-01-11 PROCEDURE — 84100 ASSAY OF PHOSPHORUS: CPT

## 2023-01-11 PROCEDURE — 2580000003 HC RX 258: Performed by: STUDENT IN AN ORGANIZED HEALTH CARE EDUCATION/TRAINING PROGRAM

## 2023-01-11 PROCEDURE — 85025 COMPLETE CBC W/AUTO DIFF WBC: CPT

## 2023-01-11 PROCEDURE — 83735 ASSAY OF MAGNESIUM: CPT

## 2023-01-11 PROCEDURE — 6360000002 HC RX W HCPCS: Performed by: FAMILY MEDICINE

## 2023-01-11 PROCEDURE — 6360000002 HC RX W HCPCS: Performed by: STUDENT IN AN ORGANIZED HEALTH CARE EDUCATION/TRAINING PROGRAM

## 2023-01-11 RX ORDER — FERROUS SULFATE 325(65) MG
325 TABLET ORAL
Qty: 30 TABLET | Refills: 3 | Status: SHIPPED | OUTPATIENT
Start: 2023-01-11

## 2023-01-11 RX ORDER — LANOLIN ALCOHOL/MO/W.PET/CERES
400 CREAM (GRAM) TOPICAL DAILY
Qty: 30 TABLET | Refills: 0 | Status: SHIPPED | OUTPATIENT
Start: 2023-01-11

## 2023-01-11 RX ORDER — PREDNISONE 10 MG/1
TABLET ORAL
Qty: 8 TABLET | Refills: 0 | Status: SHIPPED | OUTPATIENT
Start: 2023-01-11 | End: 2023-01-19

## 2023-01-11 RX ADMIN — HYDROCORTISONE SODIUM SUCCINATE 50 MG: 100 INJECTION, POWDER, FOR SOLUTION INTRAMUSCULAR; INTRAVENOUS at 06:13

## 2023-01-11 RX ADMIN — ALLOPURINOL 100 MG: 100 TABLET ORAL at 09:49

## 2023-01-11 RX ADMIN — IPRATROPIUM BROMIDE AND ALBUTEROL SULFATE 1 AMPULE: .5; 2.5 SOLUTION RESPIRATORY (INHALATION) at 16:06

## 2023-01-11 RX ADMIN — ARFORMOTEROL TARTRATE 15 MCG: 15 SOLUTION RESPIRATORY (INHALATION) at 07:55

## 2023-01-11 RX ADMIN — IPRATROPIUM BROMIDE AND ALBUTEROL SULFATE 1 AMPULE: .5; 2.5 SOLUTION RESPIRATORY (INHALATION) at 07:54

## 2023-01-11 RX ADMIN — LEVOTHYROXINE SODIUM 88 MCG: 0.09 TABLET ORAL at 06:13

## 2023-01-11 RX ADMIN — ASPIRIN 81 MG: 81 TABLET, COATED ORAL at 09:48

## 2023-01-11 RX ADMIN — MIDODRINE HYDROCHLORIDE 5 MG: 5 TABLET ORAL at 13:02

## 2023-01-11 RX ADMIN — SODIUM CHLORIDE, PRESERVATIVE FREE 10 ML: 5 INJECTION INTRAVENOUS at 09:49

## 2023-01-11 RX ADMIN — APIXABAN 5 MG: 5 TABLET, FILM COATED ORAL at 09:48

## 2023-01-11 RX ADMIN — BUDESONIDE 500 MCG: 0.5 SUSPENSION RESPIRATORY (INHALATION) at 07:54

## 2023-01-11 RX ADMIN — FERROUS SULFATE TAB 325 MG (65 MG ELEMENTAL FE) 325 MG: 325 (65 FE) TAB at 09:48

## 2023-01-11 RX ADMIN — ATORVASTATIN CALCIUM 40 MG: 40 TABLET, FILM COATED ORAL at 09:48

## 2023-01-11 RX ADMIN — IPRATROPIUM BROMIDE AND ALBUTEROL SULFATE 1 AMPULE: .5; 2.5 SOLUTION RESPIRATORY (INHALATION) at 12:35

## 2023-01-11 RX ADMIN — MIDODRINE HYDROCHLORIDE 5 MG: 5 TABLET ORAL at 09:48

## 2023-01-11 RX ADMIN — BUMETANIDE 2 MG: 1 TABLET ORAL at 09:48

## 2023-01-11 RX ADMIN — Medication 400 MG: at 09:48

## 2023-01-11 ASSESSMENT — PAIN SCALES - GENERAL: PAINLEVEL_OUTOF10: 2

## 2023-01-11 NOTE — PROGRESS NOTES
Department of Internal Medicine  Nephrology Progress Note      Reason for Consult: RADHA  Requesting Physician:  JACQUELINE Yanes CNP    CHIEF COMPLAINT:  weakness     History Obtained From:  patient, electronic medical record    HISTORY OF PRESENT ILLNESS:  He reports weakness and shaking x 1 day.      Past Medical History:        Diagnosis Date    RADHA (acute kidney injury) ([facility] [address] 3/10/2022    Atrial fibrillation ([facility] [address]     Carpal tunnel syndrome     Encounter regarding vascular access for dialysis for ESRD ([facility] [address] 3/12/2022    Hyperlipidemia     Hypertension     Hypothyroidism     Lung nodule     Nocturnal hypoxemia due to obesity 8/8/2013    Obesity     NIKO (obstructive sleep apnea) 8/8/2013    [facility]    Osteoarthritis     Pinched nerve     Lumbar    Restrictive lung disease secondary to obesity 7/25/2016    Sinus node dysfunction (HCC)     Type II or unspecified type diabetes mellitus without mention of complication, not stated as uncontrolled      Past Surgical History:        Procedure Laterality Date    BACK SURGERY  2012    [facility]. Pinched nerve in back    BACK SURGERY  05/30/2017    BACK SURGERY N/A 11/8/2022    EXCISON OF SOFT TISSUE NEOPLASM OF UPPER BACK performed by Babar Ozuna MD at [address] [facility]  2007    multiple colon polyps    COLONOSCOPY  06/04/2012    COLONOSCOPY    COLONOSCOPY  04/26/2022    polyp; diverticula--sarah    COLONOSCOPY N/A 4/26/2022    COLONOSCOPY POLYPECTOMY HOT BIOPSY (Snare) performed by Babar Ozuna MD at [address]      lennie cataracts implant    HERNIA REPAIR      umbilical    PACEMAKER PLACEMENT  10/03/2017    Medtronic dual chamber    Dr. Yanni Blandon      Current Medications:    Current Facility-Administered Medications: hydrocortisone sodium succinate PF (SOLU-CORTEF) injection 25 mg, 25 mg, IntraVENous, Q12H  midodrine (PROAMATINE) tablet 5 mg, 5 mg, Oral, TID WC  bumetanide (BUMEX) tablet 2 mg, 2 mg, Oral, Daily  traMADol (ULTRAM) tablet 50 mg, 50 mg, Oral, Q6H PRN  acetaminophen (TYLENOL) tablet 1,000 mg, 1,000 mg, Oral, Daily PRN  allopurinol (ZYLOPRIM) tablet 100 mg, 100 mg, Oral, Daily  aspirin EC tablet 81 mg, 81 mg, Oral, Daily  atorvastatin (LIPITOR) tablet 40 mg, 40 mg, Oral, Daily  apixaban (ELIQUIS) tablet 5 mg, 5 mg, Oral, BID  ferrous sulfate (IRON 325) tablet 325 mg, 325 mg, Oral, Daily with breakfast  insulin glargine (LANTUS) injection vial 55 Units, 55 Units, SubCUTAneous, Nightly  [Held by provider] insulin lispro (HUMALOG) injection vial 20 Units, 20 Units, SubCUTAneous, TID AC  levothyroxine (SYNTHROID) tablet 88 mcg, 88 mcg, Oral, Daily  sodium chloride flush 0.9 % injection 5-40 mL, 5-40 mL, IntraVENous, 2 times per day  sodium chloride flush 0.9 % injection 5-40 mL, 5-40 mL, IntraVENous, PRN  0.9 % sodium chloride infusion, , IntraVENous, PRN  polyethylene glycol (GLYCOLAX) packet 17 g, 17 g, Oral, Daily PRN  [DISCONTINUED] acetaminophen (TYLENOL) tablet 650 mg, 650 mg, Oral, Q6H PRN **OR** acetaminophen (TYLENOL) suppository 650 mg, 650 mg, Rectal, Q6H PRN  glucose chewable tablet 16 g, 4 tablet, Oral, PRN  dextrose bolus 10% 125 mL, 125 mL, IntraVENous, PRN **OR** dextrose bolus 10% 250 mL, 250 mL, IntraVENous, PRN  glucagon (rDNA) injection 1 mg, 1 mg, SubCUTAneous, PRN  dextrose 10 % infusion, , IntraVENous, Continuous PRN  insulin lispro (HUMALOG) injection vial 0-8 Units, 0-8 Units, SubCUTAneous, TID WC  insulin lispro (HUMALOG) injection vial 0-4 Units, 0-4 Units, SubCUTAneous, Nightly  magnesium oxide (MAG-OX) tablet 400 mg, 400 mg, Oral, Daily  budesonide (PULMICORT) nebulizer suspension 500 mcg, 0.5 mg, Nebulization, BID  arformoterol tartrate (BROVANA) nebulizer solution 15 mcg, 15 mcg, Nebulization, BID  ipratropium-albuterol (DUONEB) nebulizer solution 1 ampule, 1 ampule, Inhalation, Q4H WA  hydrOXYzine pamoate (VISTARIL) capsule 25 mg, 25 mg, Oral, TID PRN  Allergies:  Patient has no known allergies. Social History:    TOBACCO:   reports that he quit smoking about 18 years ago. His smoking use included cigarettes. He has a 3.50 pack-year smoking history. He quit smokeless tobacco use about 19 years ago. ETOH:   reports no history of alcohol use.     Family History:       Problem Relation Age of Onset    Cancer Mother         skin    Heart Disease Mother     High Blood Pressure Father     Amyotrophic lateral sclerosis Father     Cancer Brother     High Blood Pressure Brother     Diabetes Brother      REVIEW OF SYSTEMS:    CONSTITUTIONAL:  negative  EYES:  negative  HEENT:  negative  RESPIRATORY:  negative  CARDIOVASCULAR:  negative  GASTROINTESTINAL:  negative  GENITOURINARY:  negative  INTEGUMENT/BREAST:  negative  HEMATOLOGIC/LYMPHATIC:  negative  ALLERGIC/IMMUNOLOGIC:  negative  ENDOCRINE:  negative  MUSCULOSKELETAL:  negative  NEUROLOGICAL:  negative  BEHAVIOR/PSYCH:  negative  PHYSICAL EXAM:      Vitals:    VITALS:  /77   Pulse 86   Temp 97.7 °F (36.5 °C) (Oral)   Resp 20   Ht 5' 7\" (1.702 m)   Wt 240 lb 11.2 oz (109.2 kg)   SpO2 96%   BMI 37.70 kg/m²   24HR BLOOD PRESSURE RANGE:  Systolic (61TKE), POST:204 , Min:106 , TEA:168 ; Diastolic (73TDR), XZY:37, Min:71, Max:82  24HR INTAKE/OUTPUT:    Intake/Output Summary (Last 24 hours) at 1/11/2023 1331  Last data filed at 1/10/2023 1903  Gross per 24 hour   Intake 120 ml   Output 750 ml   Net -630 ml         Constitutional:  A&O, NAD  HEENT:  PERRLA  Respiratory:  CTA  Cardiovascular/Edema:  RRR  Gastrointestinal:  + BS  Neurologic:  no deficit  Skin:  left arm redness  Other:  no edema    DATA:    CBC with Differential:    Lab Results   Component Value Date/Time    WBC 12.3 01/11/2023 03:44 AM    RBC 3.92 01/11/2023 03:44 AM    HGB 11.3 01/11/2023 03:44 AM    HCT 36.0 01/11/2023 03:44 AM     01/11/2023 03:44 AM    MCV 91.8 01/11/2023 03:44 AM    MCH 28.8 01/11/2023 03:44 AM    MCHC 31.4 01/11/2023 03:44 AM    RDW 16.5 01/11/2023 03:44 AM    SEGSPCT 68 04/27/2011 10:30 AM    LYMPHOPCT 22.4 01/11/2023 03:44 AM    MONOPCT 8.8 01/11/2023 03:44 AM    EOSPCT 7 10/05/2010 10:52 AM    BASOPCT 0.8 01/11/2023 03:44 AM    MONOSABS 1.08 01/11/2023 03:44 AM    LYMPHSABS 2.76 01/11/2023 03:44 AM    EOSABS 0.15 01/11/2023 03:44 AM    BASOSABS 0.10 01/11/2023 03:44 AM     CMP:    Lab Results   Component Value Date/Time     01/11/2023 03:44 AM    K 4.8 01/11/2023 03:44 AM    K 5.1 01/08/2023 05:35 AM     01/11/2023 03:44 AM    CO2 21 01/11/2023 03:44 AM    BUN 44 01/11/2023 03:44 AM    CREATININE 1.5 01/11/2023 03:44 AM    GFRAA >60 10/03/2022 10:55 AM    LABGLOM 50 01/11/2023 03:44 AM    GLUCOSE 194 01/11/2023 03:44 AM    GLUCOSE 133 04/18/2012 08:43 AM    PROT 6.7 01/08/2023 05:35 AM    LABALBU 3.4 01/08/2023 05:35 AM    LABALBU 4.4 10/25/2011 09:50 AM    CALCIUM 9.1 01/11/2023 03:44 AM    BILITOT 0.4 01/08/2023 05:35 AM    ALKPHOS 114 01/08/2023 05:35 AM    AST 16 01/08/2023 05:35 AM    ALT 29 01/08/2023 05:35 AM     Magnesium:    Lab Results   Component Value Date/Time    MG 2.2 01/11/2023 03:44 AM     Phosphorus:    Lab Results   Component Value Date/Time    PHOS 3.8 01/11/2023 03:44 AM     Radiology Review:      CXR 1/1/23   Right IJ central line tip localized at proximal SVC. Mild cardiomegaly. IMPRESSION/RECOMMENDATIONS:      RADHA, stage 1, likely prerenal RADHA 2/2 intravascular volume depletion from poor oral intake and overdiuresis in the setting of ACE inhibition, Prince catheter in place. Creatinine level peaked at 4.5, has improved to 1.6 mg/dL with IVF administration. CKD stage IIIb likely 2/2 nephrosclerosis and previous episodes of ischemic ATN requiring HD x2 separate occasions.   Baseline creatinine 1.4 to 1.5 mg/dL     HTN, on metoprolol, lisinopril on hold  Type II DM, on jardiance  Hypothyroidism on levothyroxine  NIKO  HFpEF 55 to 60%        Plan:     Recommend CHF clinic outpatient   Follow up in office

## 2023-01-11 NOTE — DISCHARGE SUMMARY
[facility] Physician Discharge Summary       Moraima Cantrell, [address] [facility] [phone]    Schedule an appointment as soon as possible for a visit      Elliot Quinones MD  [address]  [phone]    Schedule an appointment as soon as possible for a visit  Make an appointment in 1 week to go over hospitalization, medications and follow up. Activity level: As tolerated    Dispo: Home    Condition on discharge:     Patient ID:  Samir Araya  [id number]  69 y.o.  1955    Admit date: 1/7/2023    Discharge date and time:  1/11/2023  12:59 PM    Admission Diagnoses:   Principal Problem:    Hypovolemia  Active Problems:    Hypotension    Hypovolemic shock (HCC)    Acute metabolic encephalopathy    RADHA (acute kidney injury) ([address]  Resolved Problems:    * No resolved hospital problems. *      Discharge Diagnoses:   Principal Problem:    Hypovolemia  Active Problems:    Hypotension    Hypovolemic shock (HCC)    Acute metabolic encephalopathy    RADHA (acute kidney injury) ([address]  Resolved Problems:    * No resolved hospital problems. *      Consults:  IP CONSULT TO CRITICAL CARE  IP CONSULT TO CRITICAL CARE  IP CONSULT TO CRITICAL CARE  IP CONSULT TO NEPHROLOGY  IP CONSULT TO IV TEAM  IP CONSULT TO IV TEAM    Hospital Course:   Patient Samir Araya is a 69 y.o. presented with Hypovolemia [E86.1]  Hypovolemic shock ([address] [R57.1]  RADHA (acute kidney injury) ([address] [N17.9]  Hypotension, unspecified hypotension type [I95.9]   Patient presented to the ER with difficulty ambulating and weakness. In ER he was found to be encephalopathic with an RADHA and hypotension. Nephrology was consulted. He was followed and treated for;    1. Acute metabolic encephalopathy likely related to RADHA/ hypotension- resolved : pt presented to the ER with weakness/ difficulty ambulating.  Recent diarrhea  He was just discharged from [address] [place] [address] on 12/30 following ICU admission for hypovolemic/ /acute encephalopathy. Pt reporting compliance with medications and follow up. In ER this admission pt received 4L IVF with ongoing low bp. Started on pressors. Plan was to admit to ICU. However in ER then off pressors and downgraded to telemetry. Note to have confusion in ER. CT head negative in ER. Ammonia level wnl. UDS + oxycodone which is on home med list. Encephalopathy resolved. Patient feeling better and ready to go home. Plan to discharge home this afternoon. 2. Unspecified shock/ hypotension: likely from hypovolemia- resolved. Received aggressive fluid resuscitation. Required pressors for only a few hours in ER then weaned off. Now on telemetry floor. Started on solucortef- continue to wean- change to po prednisone taper for discharge. Continue to hold lisinopril/ metoprolol. He will need BP check in 1 week. Received IV bumex yesterday. Hold bumex on dc. Discussed with attending. Will need close outpt follow up for close volume management. Ideally should see PCP within 1 week to have BP check/ see if can resume diuretics/ bp meds. 3. RADHA on CKD with hyperkalemia: creatinine 4.5 on admission. Appreciate nephrology input. Down trending- 1.5. Appears on discharge recent admission was 1.5. Holding ace. Avoid nephrotoxic meds. Outpt follow up with PCP    4 Hyponatremia: monitor     5. Diarrhea: CT abdomen with no pathology     6. Elevated troponin: 60- trend. EKG with no ischemia     7. HFpEF: echo reviewed from 8/2022- EF 65% with stage II diastolic dysfunction. Holding bumex. Check pro bnp. CXR. 8. Hypotension with h/o HTN; holding ace/ metoprolol     9. Anemia- iron deficiency component: monitor H &H      10. DM: hga1c 7.2. Continue lantus/ insulin ss     11. Thyroid disease: continue synthroid     12. Chronic atrial fibrillation: holding BB- continue eliquis     13. Restrictive lung disease/ NIKO- wears 2.5L- 3L nocturnally.  Appears pt is on his baseline 02.      14. Difficulty ambulating/ deconditioning: PT/OT     15. + Blood culture. Coag neg staph 1/2 bottles/ Likely due to contamination. Patient feeling much better and eager to home. Patient discharged in stable condition with the following medications, instructions and follow up. BP check in 1 week. Discharge Exam:  General Appearance: awake and alert. Oriented x 3  Skin: warm and dry  Head: normocephalic and atraumatic  Neck: neck supple and non tender without mass   Pulmonary/Chest: clear to auscultation bilaterally-  Cardiovascular: normal rate, normal S1 and S2 and no carotid bruits  Abdomen: soft, non-tender, non-distended, normal bowel sounds  Extremities: no cyanosis, no clubbing and no edema  Neurologic: speech normal           I/O last 3 completed shifts: In: 240 [P.O.:240]  Out: 1100 [Urine:1100]  No intake/output data recorded. LABS:  Recent Labs     01/09/23  0219 01/10/23  0405 01/10/23  1055 01/11/23  0344    134  --  133   K 4.5 5.6* 4.9 4.8    102  --  100   CO2 21* 17*  --  21*   BUN 56* 48*  --  44*   CREATININE 1.6* 1.3*  --  1.5*   GLUCOSE 220* 250*  --  194*   CALCIUM 8.5* 8.9  --  9.1       Recent Labs     01/09/23  0219 01/10/23  0405 01/11/23  0344   WBC 6.7 16.6* 12.3*   RBC 3.35* 4.22 3.92   HGB 10.0* 12.4* 11.3*   HCT 31.0* 38.4 36.0*   MCV 92.5 91.0 91.8   MCH 29.9 29.4 28.8   MCHC 32.3 32.3 31.4*   RDW 16.1* 16.7* 16.5*    442 380   MPV 9.9 10.2 9.9       No results for input(s): POCGLU in the last 72 hours. Imaging:  CT ABDOMEN PELVIS WO CONTRAST Additional Contrast? None    Result Date: 1/7/2023  EXAMINATION: CT OF THE ABDOMEN AND PELVIS WITHOUT CONTRAST 1/7/2023 3:23 pm TECHNIQUE: CT of the abdomen and pelvis was performed without the administration of intravenous contrast. Multiplanar reformatted images are provided for review.  Automated exposure control, iterative reconstruction, and/or weight based adjustment of the mA/kV was utilized to reduce the radiation dose to as low as reasonably achievable. COMPARISON: 12/24/2022 HISTORY: ORDERING SYSTEM PROVIDED HISTORY: lower back pain TECHNOLOGIST PROVIDED HISTORY: Reason for exam:->lower back pain Additional Contrast?->None FINDINGS: Lower Chest:  Visualized portion of the lower chest demonstrates no acute abnormality. Cardiomegaly is partially visualized. Organs: Nonenhanced liver is unremarkable. No biliary ductal dilatation seen. The gallbladder is unremarkable. Previously seen peripancreatic inflammation extending into the central mesentery has significantly improved with a minimal amount remaining. The spleen and adrenal glands are unremarkable. No hydronephrosis is identified. No renal calculi are seen. GI/Bowel: Stomach and duodenal sweep demonstrate no acute abnormality. There is no evidence of bowel obstruction. No evidence of abnormal bowel wall thickening or distension. There is diverticulosis without evidence of diverticulitis. The appendix is visualized and is unremarkable. No evidence of acute appendicitis. Pelvis: Small focus of intraluminal gas is noted in the bladder likely related to recent instrumentation. No acute abnormality of the prostate. Peritoneum/Retroperitoneum: No evidence of ascites or free air. No evidence of lymphadenopathy. Aorta is normal in caliber. Bones/Soft Tissues:  No acute abnormality of the visualized osseous structures. Stable lumbar postsurgical changes from L3-L5 without evidence of complication. No CT etiology for patient's acute lower back pain identified. Stable L3-L5 fusion changes with laminectomies and fixation. Significant decrease in previously seen peripancreatic and central mesenteric inflammatory change with a minimal amount remaining.      CT HEAD WO CONTRAST    Result Date: 1/8/2023  EXAMINATION: CT OF THE HEAD WITHOUT CONTRAST  1/8/2023 5:04 am TECHNIQUE: CT of the head was performed without the administration of intravenous contrast. Automated exposure control, iterative reconstruction, and/or weight based adjustment of the mA/kV was utilized to reduce the radiation dose to as low as reasonably achievable. COMPARISON: None. HISTORY: ORDERING SYSTEM PROVIDED HISTORY: AMS TECHNOLOGIST PROVIDED HISTORY: Reason for exam:->AMS Has a \"code stroke\" or \"stroke alert\" been called? ->No FINDINGS: BRAIN/VENTRICLES: There is no acute intracranial hemorrhage, mass effect or midline shift. No abnormal extra-axial fluid collection. The gray-white differentiation is maintained without evidence of an acute infarct. There is no evidence of hydrocephalus. Mild atrophy and periventricular white matter degenerative change. ORBITS: The visualized portion of the orbits demonstrate no acute abnormality. SINUSES: The visualized paranasal sinuses and mastoid air cells demonstrate no acute abnormality. SOFT TISSUES/SKULL:  No acute abnormality of the visualized skull or soft tissues. No acute intracranial abnormality. RECOMMENDATIONS: Careful clinical correlation and follow up recommended. XR CHEST PORTABLE    Result Date: 1/7/2023  EXAMINATION: ONE XRAY VIEW OF THE CHEST 1/7/2023 9:55 pm COMPARISON: AP chest, 01/07/2023; AP chest, 12/26/2022 HISTORY: ORDERING SYSTEM PROVIDED HISTORY: confirm right IJ placement TECHNOLOGIST PROVIDED HISTORY: Reason for exam:->confirm right IJ placement FINDINGS: Lines, tubes, and devices: Right internal jugular central line with tip terminating at level of the proximal superior vena cava. Pacemaker is superimposed over left axilla with leads extending to right atrium and right ventricle. Lungs and pleura: There is bilateral lung hyperinflation which may have association with COPD/emphysema. No focal consolidation. No pleural effusion. No pneumothorax. Cardiomediastinal silhouette: The mediastinum is stable. The heart is mildly enlarged. Bones and soft tissues: There are total bilateral shoulder joint arthroplasties. Right IJ central line tip localized at proximal SVC. Mild cardiomegaly. XR CHEST PORTABLE    Result Date: 1/7/2023  EXAMINATION: ONE XRAY VIEW OF THE CHEST 1/7/2023 4:46 pm COMPARISON: 12/26/2022. HISTORY: ORDERING SYSTEM PROVIDED HISTORY: cough TECHNOLOGIST PROVIDED HISTORY: Reason for exam:->cough FINDINGS: There is a pacemaker/defibrillator. The cardiomediastinal silhouette is enlarged. The pulmonary vasculature is within normal limits. No segmental or lobar pulmonary consolidation or collapse is identified. No pneumothorax or pleural effusion is seen. The left shoulder has been replaced. Enlarged cardiomediastinal silhouette. No visible acute pulmonary disease. Patient Instructions:      Medication List        START taking these medications      magnesium oxide 400 (240 Mg) MG tablet  Commonly known as: MAG-OX  Take 1 tablet by mouth daily     predniSONE 10 MG tablet  Commonly known as: DELTASONE  Take 2 tablets by mouth daily for 2 days, THEN 1 tablet daily for 2 days, THEN 0.5 tablets daily for 2 days, THEN 0.5 tablets every other day for 2 days. Start taking on: January 11, 2023            CONTINUE taking these medications      acetaminophen 500 MG tablet  Commonly known as: TYLENOL     allopurinol 100 MG tablet  Commonly known as: ZYLOPRIM     apixaban 5 MG Tabs tablet  Commonly known as: ELIQUIS     aspirin 81 MG EC tablet  Commonly known as: Aspir-Low  TAKE ONE TABLET BY MOUTH EVERY DAY     atorvastatin 40 MG tablet  Commonly known as: LIPITOR  TAKE ONE TABLET BY MOUTH DAILY     Basaglar KwikPen 100 UNIT/ML injection pen  Generic drug: insulin glargine  Inject 55 Units into the skin nightly PAP medication.      empagliflozin 25 MG tablet  Commonly known as: Jardiance  Take 1 tablet by mouth daily     ferrous sulfate 325 (65 Fe) MG tablet  Commonly known as: IRON 325  Take 1 tablet by mouth daily (with breakfast)     gabapentin 600 MG tablet  Commonly known as: NEURONTIN     insulin lispro (1 Unit Dial) 100 UNIT/ML Sopn  Commonly known as: HumaLOG KwikPen  Inject 20 Units into the skin 3 times daily (before meals) for 225 doses     Lancets Misc  Give Delica lancets. Tests twice a day A.M. And P.M     levothyroxine 88 MCG tablet  Commonly known as: SYNTHROID  Take 1 tablet by mouth in the morning. metoprolol succinate 25 MG extended release tablet  Commonly known as: TOPROL XL  Take 1 tablet by mouth daily     * Misc. Devices Kit  Dispense 1 blood pressure cuff. * Misc. Devices Misc  Please add portability to current O2 order. Resp to evaluate patient for OCD device. mometasone-formoterol 200-5 MCG/ACT inhaler  Commonly known as: Dulera  Inhale 2 puffs into the lungs 2 times daily Rinse mouth after using. PAP medication. OneTouch Verio strip  Generic drug: blood glucose test strips  TEST IN THE MORNING AND IN THE EVENING     * Pen Needles 32G X 6 MM Misc  Take insulin daily     * Pen Needles 31G X 6 MM Misc  Once daily. May substitute brand for insurance coverage. vitamin D 50 MCG (2000 UT) Caps capsule           * This list has 4 medication(s) that are the same as other medications prescribed for you. Read the directions carefully, and ask your doctor or other care provider to review them with you.                 STOP taking these medications      bumetanide 2 MG tablet  Commonly known as: BUMEX     lisinopril 5 MG tablet  Commonly known as: PRINIVIL;ZESTRIL     metFORMIN 500 MG tablet  Commonly known as: GLUCOPHAGE            ASK your doctor about these medications      OXYGEN  Inhale 2.5 L into the lungs nightly               Where to Get Your Medications        These medications were sent to [facility] Adeline Alba [address] [place] [address] Dear Feliberto [phone] - [place] [address]      Phone: [phone]   ferrous sulfate 325 (65 Fe) MG tablet  magnesium oxide 400 (240 Mg) MG tablet  predniSONE 10 MG tablet           Note that more than 30 minutes was spent in preparing discharge papers, discussing discharge with patient, medication review, etc.    Signed:  Electronically signed by WILLIAN Harden on 1/11/2023 at 12:59 PM

## 2023-01-12 ENCOUNTER — TELEPHONE (OUTPATIENT)
Dept: INTERNAL MEDICINE | Age: 68
End: 2023-01-12

## 2023-01-12 NOTE — TELEPHONE ENCOUNTER
Care Transitions Initial Follow Up Call    Outreach made within 2 business days of discharge: Yes    Patient: Martine Carroll Patient : 1955   MRN: [id number]  Reason for Admission: There are no discharge diagnoses documented for the most recent discharge. Discharge Date: 23       Spoke with: VM left for patient to call office and ask for this nurse.      Discharge department/facility: [address] [place] [address]    Scheduled appointment with PCP within 7-14 days    Follow Up  Future Appointments   Date Time Provider Stacie Mtz   2023  9:30 AM [facility] ROOM 1 [facility]   2023 12:30 PM Avani Gannon MD [facility]   2023  9:00 AM Alycia Leon MD [facility] Naveed Hayward RN

## 2023-01-13 LAB
BLOOD CULTURE, ROUTINE: ABNORMAL
CULTURE, BLOOD 2: NORMAL
ORGANISM: ABNORMAL

## 2023-01-13 NOTE — TELEPHONE ENCOUNTER
Care Transitions Initial Follow Up Call    Outreach made within 2 business days of discharge: Yes    Patient: Brittanie Oates Patient : 1955   MRN: [id number]  Reason for Admission: There are no discharge diagnoses documented for the most recent discharge. Discharge Date: 23       Spoke with: Eric Rocha    Discharge department/facility: [address] [place] [address] 5&    TCM Interactive Patient Contact:  Was patient able to fill all prescriptions: Yes  Was patient instructed to bring all medications to the follow-up visit: Yes  Is patient taking all medications as directed in the discharge summary?  Yes  Does patient understand their discharge instructions: Yes  Does patient have questions or concerns that need addressed prior to 7-14 day follow up office visit: no    Scheduled appointment with PCP within 7-14 days    Follow Up  Future Appointments   Date Time Provider Stacie Mtz   2023  9:30 AM [facility] ROOM 1 [facility]   2023  3:00 PM Estelita Vasquez MD [facility]   2023 12:30 PM Janet Boyer MD [facility]   2023  9:00 AM Kunal Flowers MD [facility]       Uli Grayson LPN

## 2023-01-18 ENCOUNTER — TELEPHONE (OUTPATIENT)
Dept: INTERNAL MEDICINE | Age: 68
End: 2023-01-18

## 2023-01-18 NOTE — TELEPHONE ENCOUNTER
Email from [employer]iz[employer] High of PAP that pt was denied for PAP Jardiance from drug  due to over income guidelines

## 2023-01-19 ENCOUNTER — OFFICE VISIT (OUTPATIENT)
Dept: INTERNAL MEDICINE | Age: 68
End: 2023-01-19
Payer: MEDICARE

## 2023-01-19 ENCOUNTER — HOSPITAL ENCOUNTER (OUTPATIENT)
Dept: OTHER | Age: 68
Setting detail: THERAPIES SERIES
Discharge: HOME | End: 2023-01-19
Payer: MEDICARE

## 2023-01-19 VITALS
RESPIRATION RATE: 18 BRPM | WEIGHT: 232 LBS | HEART RATE: 84 BPM | DIASTOLIC BLOOD PRESSURE: 62 MMHG | BODY MASS INDEX: 36.34 KG/M2 | OXYGEN SATURATION: 96 % | SYSTOLIC BLOOD PRESSURE: 130 MMHG

## 2023-01-19 VITALS
WEIGHT: 232.1 LBS | DIASTOLIC BLOOD PRESSURE: 61 MMHG | OXYGEN SATURATION: 96 % | HEIGHT: 67 IN | BODY MASS INDEX: 36.43 KG/M2 | RESPIRATION RATE: 20 BRPM | SYSTOLIC BLOOD PRESSURE: 112 MMHG | HEART RATE: 73 BPM | TEMPERATURE: 97.6 F

## 2023-01-19 DIAGNOSIS — Z09 HOSPITAL DISCHARGE FOLLOW-UP: ICD-10-CM

## 2023-01-19 DIAGNOSIS — N18.32 STAGE 3B CHRONIC KIDNEY DISEASE (HCC): ICD-10-CM

## 2023-01-19 DIAGNOSIS — I10 ESSENTIAL HYPERTENSION: Primary | Chronic | ICD-10-CM

## 2023-01-19 DIAGNOSIS — E11.43 TYPE 2 DIABETES MELLITUS WITH DIABETIC AUTONOMIC NEUROPATHY, WITHOUT LONG-TERM CURRENT USE OF INSULIN (HCC): Chronic | ICD-10-CM

## 2023-01-19 LAB
ANION GAP SERPL CALCULATED.3IONS-SCNC: 9 MMOL/L (ref 7–16)
BUN BLDV-MCNC: 17 MG/DL (ref 6–23)
CALCIUM SERPL-MCNC: 9.5 MG/DL (ref 8.6–10.2)
CHLORIDE BLD-SCNC: 97 MMOL/L (ref 98–107)
CO2: 29 MMOL/L (ref 22–29)
CREAT SERPL-MCNC: 1.3 MG/DL (ref 0.7–1.2)
GFR SERPL CREATININE-BSD FRML MDRD: 59 ML/MIN/1.73
GLUCOSE BLD-MCNC: 143 MG/DL (ref 74–99)
POTASSIUM SERPL-SCNC: 4.6 MMOL/L (ref 3.5–5)
PRO-BNP: 5982 PG/ML (ref 0–125)
SODIUM BLD-SCNC: 135 MMOL/L (ref 132–146)

## 2023-01-19 PROCEDURE — 99212 OFFICE O/P EST SF 10 MIN: CPT | Performed by: STUDENT IN AN ORGANIZED HEALTH CARE EDUCATION/TRAINING PROGRAM

## 2023-01-19 PROCEDURE — 3078F DIAST BP <80 MM HG: CPT | Performed by: STUDENT IN AN ORGANIZED HEALTH CARE EDUCATION/TRAINING PROGRAM

## 2023-01-19 PROCEDURE — 1124F ACP DISCUSS-NO DSCNMKR DOCD: CPT | Performed by: STUDENT IN AN ORGANIZED HEALTH CARE EDUCATION/TRAINING PROGRAM

## 2023-01-19 PROCEDURE — 99214 OFFICE O/P EST MOD 30 MIN: CPT | Performed by: STUDENT IN AN ORGANIZED HEALTH CARE EDUCATION/TRAINING PROGRAM

## 2023-01-19 PROCEDURE — 36415 COLL VENOUS BLD VENIPUNCTURE: CPT

## 2023-01-19 PROCEDURE — 3074F SYST BP LT 130 MM HG: CPT | Performed by: STUDENT IN AN ORGANIZED HEALTH CARE EDUCATION/TRAINING PROGRAM

## 2023-01-19 PROCEDURE — 1111F DSCHRG MED/CURRENT MED MERGE: CPT | Performed by: STUDENT IN AN ORGANIZED HEALTH CARE EDUCATION/TRAINING PROGRAM

## 2023-01-19 PROCEDURE — 99214 OFFICE O/P EST MOD 30 MIN: CPT

## 2023-01-19 PROCEDURE — 83880 ASSAY OF NATRIURETIC PEPTIDE: CPT

## 2023-01-19 PROCEDURE — 80048 BASIC METABOLIC PNL TOTAL CA: CPT

## 2023-01-19 PROCEDURE — 3051F HG A1C>EQUAL 7.0%<8.0%: CPT | Performed by: STUDENT IN AN ORGANIZED HEALTH CARE EDUCATION/TRAINING PROGRAM

## 2023-01-19 RX ORDER — BUMETANIDE 2 MG/1
2 TABLET ORAL
COMMUNITY

## 2023-01-19 RX ORDER — GABAPENTIN 600 MG/1
600 TABLET ORAL 2 TIMES DAILY
COMMUNITY

## 2023-01-19 NOTE — PLAN OF CARE
Problem: Chronic Conditions and Co-morbidities  Goal: Patient's chronic conditions and co-morbidity symptoms are monitored and maintained or improved  Flowsheets (Taken 1/19/2023 0705)  Care Plan - Patient's Chronic Conditions and Co-Morbidity Symptoms are Monitored and Maintained or Improved: Monitor and assess patient's chronic conditions and comorbid symptoms for stability, deterioration, or improvement

## 2023-01-19 NOTE — PATIENT INSTRUCTIONS
Dear Arlander Kussmaul Deemer,        Thank you for coming to your appointment today. I hope we have addressed all of your needs. Please make sure to do the following:  - Continue your medications as listed. - Get labs drawn before our next follow up. - Please continue to HOLD insulin at this time  - Please RESTART taking Lantus at reduced dose of 20u nightly once blood sugar levels are in the 180s. - If there are any concerns of consistently elevated blood sugars, please call our office at [phone]  - Discussion on hypoglycemic symptoms were had today. Attached is also documentation of hypoglycemia symptoms. If any of these symptoms occur. Please give patient sugar via juice, candies or other sources of sugar to help elevated blood sugar levels  - We will see each other again in 2 weeks with PCP    Call for a sooner appointment if you develop any acute concerns    Have a great day!         Sincerely,  Mary Barillas M.D PGY-2  1/19/2023  3:48 PM

## 2023-01-19 NOTE — PROGRESS NOTES
Congestive Heart Failure [address]         Mazie Lombard   1955          Referring Provider: Edgardo Alexis  Primary Care Physician: [address]  Cardiologist: NOT ESTABLISHED YET/ EP DR. Leah Hamilton  Nephrologist:  [id number]        History of Present Illness:     Mazie Lombard is a 69 y.o. male with a history of HFpEF, most recent EF 55-60%. Patient Story:    He does  have dyspnea with exertion, shortness of breath, or decline in overall functional capacity. He does have orthopnea, PND, nocturnal cough or hemoptysis. He does have abdominal distention or bloating, early satiety, anorexia/change in appetite. He does has a good urinary response to  oral diuretic. He does have  lower extremity edema. He denies lightheadedness, dizziness. He denies palpitations, syncope or near syncope. He does not complain of chest pain, pressure, discomfort. No Known Allergies        Prior to Visit Medications    Medication Sig Taking? Authorizing Provider   bumetanide (BUMEX) 2 MG tablet Take 2 mg by mouth three times a week Mon-Wed-Fri Yes Historical Provider, MD   gabapentin (NEURONTIN) 600 MG tablet Take 600 mg by mouth 3 times daily. Yes Historical Provider, MD   ferrous sulfate (IRON 325) 325 (65 Fe) MG tablet Take 1 tablet by mouth daily (with breakfast)  Brian Grubbs MD   magnesium oxide (MAG-OX) 400 (240 Mg) MG tablet Take 1 tablet by mouth daily  Brian Grubbs MD   predniSONE (DELTASONE) 10 MG tablet Take 2 tablets by mouth daily for 2 days, THEN 1 tablet daily for 2 days, THEN 0.5 tablets daily for 2 days, THEN 0.5 tablets every other day for 2 days.   Brian Grubbs MD   apixaban (ELIQUIS) 5 MG TABS tablet Take 5 mg by mouth daily  Historical Provider, MD   metoprolol succinate (TOPROL XL) 25 MG extended release tablet Take 1 tablet by mouth daily  La Weaver MD   insulin glargine (BASAGLAR KWIKPEN) 100 UNIT/ML injection pen Inject 55 Units into the skin nightly PAP medication. Joana Nagy., DO   mometasone-formoterol [facility]) 200-5 MCG/ACT inhaler Inhale 2 puffs into the lungs 2 times daily Rinse mouth after using. PAP medication. Edu Gomez MD   Insulin Pen Needle (PEN NEEDLES) 31G X 6 MM MISC Once daily. May substitute brand for insurance coverage. Edu Gomez MD   Lancets MISC Give Delica lancets. Tests twice a day A.M. And P.M  Edu Gomez MD   blood glucose test strips (ONETOUCH VERIO) strip TEST IN THE MORNING AND IN THE EVENING  Edu Gomez MD   insulin lispro, 1 Unit Dial, (HUMALOG KWIKPEN) 100 UNIT/ML SOPN Inject 20 Units into the skin 3 times daily (before meals) for 225 doses  Ozzie Gupta,    allopurinol (ZYLOPRIM) 100 MG tablet Take 100 mg by mouth daily  Historical Provider, MD   Cholecalciferol (VITAMIN D) 50 MCG (2000 UT) CAPS capsule Take 2,000 Units by mouth daily  Historical Provider, MD   aspirin (ASPIR-LOW) 81 MG EC tablet TAKE ONE TABLET BY MOUTH EVERY DAY  Neelam Bae MD   atorvastatin (LIPITOR) 40 MG tablet TAKE ONE TABLET BY MOUTH DAILY  Sofia Rangel MD   levothyroxine (SYNTHROID) 88 MCG tablet Take 1 tablet by mouth in the morning. Sofia Rangel MD   empagliflozin (JARDIANCE) 25 MG tablet Take 1 tablet by mouth daily  Emil Sewell,    Insulin Pen Needle (PEN NEEDLES) 32G X 6 MM MISC Take insulin daily  Edu Gomez MD   Misc. Devices MISC Please add portability to current O2 order. Resp to evaluate patient for OCD device. Dustin Pascual MD   Misc. Devices KIT Dispense 1 blood pressure cuff.   Valentine Graham MD   OXYGEN Inhale 2.5 L into the lungs nightly  Patient taking differently: Inhale 2.5 L into the lungs nightly Spouse states uses  5 L/min via nc bedtime  Maxwell Tong MD   acetaminophen (TYLENOL) 500 MG tablet Take 1,000 mg by mouth daily as needed for Pain  Historical Provider, MD           Guideline directed medical:  ARNI/ACE I/ARB: No  Beta blocker: Yes  Aldosterone antagonist:  No  SGLT2i: Yes      Physical Examination:     /62   Pulse 84   Resp 18   Wt 232 lb (105.2 kg)   SpO2 96%   BMI 36.34 kg/m²     Assessment  Charting Type: Shift assessment              HEENT (Head, Ears, Eyes, Nose, & Throat)  HEENT (WDL): Within Defined Limits    Respiratory  Respiratory Pattern: Regular  Respiratory Depth: Normal  Respiratory Quality/Effort: Dyspnea with exertion  Chest Assessment: Chest expansion symmetrical  L Breath Sounds: Fine Crackles, Diminished  R Breath Sounds: Fine Crackles, Diminished    Breath Sounds  Right Lower Lobe: Fine Crackles  Left Lower Lobe: Fine Crackles         Cardiac  Cardiac Rhythm: Ventricular paced    Rhythm Interpretation  Heart Rate: 84         Gastrointestinal  Abdominal (WDL): Exceptions to WDL  Abdomen Inspection: Soft, Rounded  RUQ Bowel Sounds: Active  LUQ Bowel Sounds: Active  RLQ Bowel Sounds: Active  LLQ Bowel Sounds: Active          Bowel Sounds  RUQ Bowel Sounds: Active  LUQ Bowel Sounds: Active  RLQ Bowel Sounds: Active  LLQ Bowel Sounds:  Active    Peripheral Vascular  Peripheral Vascular (WDL): Exceptions to WDL  Edema: Left lower extremity, Right lower extremity  RLE Edema: Pitting, +1  LLE Edema: Pitting, +1                                                 Heart Rate: 84                     LAB DATA:    Last 3 BMP      Sodium (mmol/L)   Date Value   01/11/2023 133   01/10/2023 134   01/09/2023 136     Potassium (mmol/L)   Date Value   01/11/2023 4.8   01/10/2023 4.9   01/10/2023 5.6 (H)     Potassium reflex Magnesium (mmol/L)   Date Value   01/08/2023 5.1 (H)   01/08/2023 5.6 (H)   12/25/2022 6.5 (H)     Chloride (mmol/L)   Date Value   01/11/2023 100   01/10/2023 102   01/09/2023 104     CO2 (mmol/L)   Date Value   01/11/2023 21 (L)   01/10/2023 17 (L)   01/09/2023 21 (L)     BUN (mg/dL)   Date Value   01/11/2023 44 (H)   01/10/2023 48 (H)   01/09/2023 56 (H)     Glucose (mg/dL)   Date Value   01/11/2023 194 (H) 01/10/2023 250 (H)   01/09/2023 220 (H)   04/18/2012 133 (H)   10/25/2011 138 (H)   01/28/2011 162 (H)     Calcium (mg/dL)   Date Value   01/11/2023 9.1   01/10/2023 8.9   01/09/2023 8.5 (L)       Last 3 BNP       Pro-BNP (pg/mL)   Date Value   01/10/2023 28,710 (H)   01/08/2023 1,717 (H)   01/07/2023 1,614 (H)          CBC: No results for input(s): WBC, HGB, PLT in the last 72 hours. BMP:    No results for input(s): NA, K, CL, CO2, BUN, CREATININE, GLUCOSE in the last 72 hours. Hepatic: No results for input(s): AST, ALT, ALB, BILITOT, ALKPHOS in the last 72 hours. Troponin: No results for input(s): TROPONINI in the last 72 hours. BNP: No results for input(s): BNP in the last 72 hours. Lipids: No results for input(s): CHOL, HDL in the last 72 hours. Invalid input(s): LDLCALCU  INR: No results for input(s): INR in the last 72 hours. WEIGHTS:    Wt Readings from Last 3 Encounters:   01/19/23 232 lb (105.2 kg)   01/11/23 240 lb 11.2 oz (109.2 kg)   12/24/22 239 lb (108.4 kg)         TELEMETRY:  Cardiac Regularity: Regular  Cardiac Rhythm/Interpretation: VPACED        ASSESSMENT:  Riri Rocha's weight is down 7 lbs from last visit on 11-30-22. States his breathing is at it's usual state. Assessment basically unchanged.      Interventions completed this visit:  IV diuretics given NO  Lab work obtained yes,  BMP/BNP  Reviewed currently prescribed medications with patient, educated on importance of compliance and answered any questions regarding their medication  Educated on signs and symptoms of HF  Educated on low sodium diet    PLAN:  Scheduled to follow up in CHF clinic on   Future Appointments   Date Time Provider Stacie Mtz   1/19/2023  3:00 PM Sunny Coopre MD [facility]   1/23/2023 12:30 PM Miriam Chopra MD [facility]   2/2/2023  9:00 AM Shalom Elam MD [facility]   2/2/2023  9:45 AM [facility] ROOM 1 [address]     Given clinic phone number and aware of signs and symptoms to call with any HF change in symptoms. AVS Sent at home. Asked patients wife to review medications and call with any discrepancies.

## 2023-01-19 NOTE — PROGRESS NOTES
Post-Discharge Transitional Care  Follow Up      Gemma Rocha   YOB: 1955    Date of Office Visit:  1/19/2023  Date of Hospital Admission: 1/7/23  Date of Hospital Discharge: 1/11/23  Risk of hospital readmission (high >=14%. Medium >=10%) :Readmission Risk Score: 24.4      Care management risk score Rising risk (score 2-5) and Complex Care (Scores >=6): No Risk Score On File     Non face to face  following discharge, date last encounter closed (first attempt may have been earlier): 01/12/2023    Call initiated 2 business days of discharge: Yes    ASSESSMENT/PLAN:   Essential hypertension  Type 2 diabetes mellitus with diabetic autonomic neuropathy, without long-term current use of insulin (HCC)  Stage 3b chronic kidney disease [facility])    Medical Decision Making: straightforward  Return in about 2 weeks (around 2/2/2023) for Follow up with PCP and Medication titration (Insulin). On this date 1/19/2023 I have spent 20 minutes reviewing previous notes, test results and face to face with the patient discussing the diagnosis and importance of compliance with the treatment plan as well as documenting on the day of the visit. Subjective:   HPI:  Follow up of Hospital problems/diagnosis(es): Initially presented to ER with weakness/difficulty ambulating. Seen to be hypotensive  was admitted to and started on pressors but got off pressors within 1 day. Patient appeared to be extremely hypovolemic. Fluid resuscitation was given and patient slowly improved. Mentation and balance improved. Cr. Seen to be 4.5 on admission. Patient's symptoms improved with fluids. Patient was also having concerns of dyskinesia and was seen to be possible adverse reaction from gabapentin which patient was on for neuropathy. Patient was discontinued off Gabapentin and patient's symptoms improved and completely gone. Patient's condition overall stabilized and patient discharged.  On discharge patient was to discontinue Bumex 2mg daily, Lisinopril 5mg daily and Gabapentin     Seemed to be hypovolemic, fluid resuscitation started and patient responded well. Holding Lisinopril/metoprolol and holding bumex on dc. RADHA on CKD on admission Cr: 4.5  - gabapentin shaking so stopped, for neuropathy  - continue off lisinopril 5mg, Bumex from daily to MWF.   - On Lantus 55u nightly and Lispro 20u TID NOT TAKING as BG 110s. Checking BG once daily. Continue holding insulin. 180s in AM or above, start 20u Lantus.   Cr: 1.3, K: 4.6  BNP: 5982    Call ever devlin in 2 weeks  Call clinic if persisent elevation of BG      Inpatient course: Discharge summary reviewed- see chart.    Interval history/Current status:   On discharge patient was to stop taking Bumex. Patient was subsequently seen by CHF clinic earlier today in which patient is to take Bumex 2mg MWF (instead of the previous daily). Patient also has been holding his Lantus 55u nightly and Lispro 20u TID. BG has been consistently in the 110s. Creatinine today improved to 1.3.  - Patient advised to continue to hold insulin. Once BG is consistently in the 180s, patient is to resume on Lantus 20u nightly   - If BG consistently is elevated after resuming Lantus, patient to call clinic for further management if this occurs prior to his next clinic appointment  - Hypoglycemic symptoms discussed and information provided on AVS as well.     Patient Active Problem List   Diagnosis    DM (diabetes mellitus) (HCC)    Class 2 obesity due to excess calories with serious comorbidity and body mass index (BMI) of 38.0 to 38.9 in adult    Hyperlipidemia    Essential hypertension    Hypothyroidism    Lung nodules    Arthritis of shoulder region, left    OA (osteoarthritis of the spine)    S/P laminectomy with spinal fusion    Colorectal polyps    Diverticula of colon    NIKO (obstructive sleep apnea)    Hypoxemia    Restrictive lung disease secondary to obesity    Atypical atrial flutter (HCC)     Sinus node dysfunction (HCC)    Pacemaker    Persistent atrial fibrillation (HCC)    Diabetes mellitus type 2 in obese (HCC)    Chronic obstructive pulmonary disease, unspecified COPD type ([facility]    Acute heart failure with preserved ejection fraction (HFpEF) ([facility])    Chronic hypercapnic respiratory failure ([facility]    RADHA (acute kidney injury) [facility]    Encounter regarding vascular access for dialysis for ESRD ([facility]    At risk for colon cancer    Acute on chronic congestive heart failure ([facility]    Acute on chronic diastolic heart failure (HCC)    Normocytic anemia    Hypotension    Stage 3b chronic kidney disease ([facility]    Acute on chronic heart failure with preserved ejection fraction (HCC)    Chronic kidney disease, stage 2 (mild)    Hypercalcemia    Proteinuria due to type 2 diabetes mellitus ([facility]    Skin lesion of face    Hyperkalemia    Hypovolemia    Hypovolemic shock ([facility]    Acute metabolic encephalopathy       Medications listed as ordered at the time of discharge from hospital     Medication List            Accurate as of January 19, 2023 11:59 PM. If you have any questions, ask your nurse or doctor. CHANGE how you take these medications      OXYGEN  Inhale 2.5 L into the lungs nightly  What changed: additional instructions            CONTINUE taking these medications      acetaminophen 500 MG tablet  Commonly known as: TYLENOL     allopurinol 100 MG tablet  Commonly known as: ZYLOPRIM     apixaban 5 MG Tabs tablet  Commonly known as: ELIQUIS     aspirin 81 MG EC tablet  Commonly known as: Aspir-Low  TAKE ONE TABLET BY MOUTH EVERY DAY     atorvastatin 40 MG tablet  Commonly known as: LIPITOR  TAKE ONE TABLET BY MOUTH DAILY     Basaglar KwikPen 100 UNIT/ML injection pen  Generic drug: insulin glargine  Inject 55 Units into the skin nightly PAP medication.      bumetanide 2 MG tablet  Commonly known as: BUMEX     empagliflozin 25 MG tablet  Commonly known as: Jardiance  Take 1 tablet by mouth daily     ferrous sulfate 325 (65 Fe) MG tablet  Commonly known as: IRON 325  Take 1 tablet by mouth daily (with breakfast)     gabapentin 600 MG tablet  Commonly known as: NEURONTIN     insulin lispro (1 Unit Dial) 100 UNIT/ML Sopn  Commonly known as: HumaLOG KwikPen  Inject 20 Units into the skin 3 times daily (before meals) for 225 doses     Lancets Misc  Give Delica lancets. Tests twice a day A.M. And P.M     levothyroxine 88 MCG tablet  Commonly known as: SYNTHROID  Take 1 tablet by mouth in the morning.     magnesium oxide 400 (240 Mg) MG tablet  Commonly known as: MAG-OX  Take 1 tablet by mouth daily     metoprolol succinate 25 MG extended release tablet  Commonly known as: TOPROL XL  Take 1 tablet by mouth daily     * Misc. Devices Kit  Dispense 1 blood pressure cuff. * Misc. Devices Misc  Please add portability to current O2 order. Resp to evaluate patient for OCD device. mometasone-formoterol 200-5 MCG/ACT inhaler  Commonly known as: Dulera  Inhale 2 puffs into the lungs 2 times daily Rinse mouth after using. PAP medication. OneTouch Verio strip  Generic drug: blood glucose test strips  TEST IN THE MORNING AND IN THE EVENING     * Pen Needles 32G X 6 MM Misc  Take insulin daily     * Pen Needles 31G X 6 MM Misc  Once daily. May substitute brand for insurance coverage. predniSONE 10 MG tablet  Commonly known as: DELTASONE  Take 2 tablets by mouth daily for 2 days, THEN 1 tablet daily for 2 days, THEN 0.5 tablets daily for 2 days, THEN 0.5 tablets every other day for 2 days. Start taking on: January 11, 2023     vitamin D 50 MCG (2000 UT) Caps capsule           * This list has 4 medication(s) that are the same as other medications prescribed for you. Read the directions carefully, and ask your doctor or other care provider to review them with you.                     Medications marked \"taking\" at this time  Outpatient Medications Marked as Taking for the 1/19/23 encounter (Office Visit) with Wallace Roberts MD   Medication Sig Dispense Refill    ferrous sulfate (IRON 325) 325 (65 Fe) MG tablet Take 1 tablet by mouth daily (with breakfast) 30 tablet 3    magnesium oxide (MAG-OX) 400 (240 Mg) MG tablet Take 1 tablet by mouth daily 30 tablet 0    [] predniSONE (DELTASONE) 10 MG tablet Take 2 tablets by mouth daily for 2 days, THEN 1 tablet daily for 2 days, THEN 0.5 tablets daily for 2 days, THEN 0.5 tablets every other day for 2 days. 8 tablet 0    apixaban (ELIQUIS) 5 MG TABS tablet Take 5 mg by mouth daily      metoprolol succinate (TOPROL XL) 25 MG extended release tablet Take 1 tablet by mouth daily 30 tablet 3    mometasone-formoterol (DULERA) 200-5 MCG/ACT inhaler Inhale 2 puffs into the lungs 2 times daily Rinse mouth after using. PAP medication. 3 each 2    blood glucose test strips (ONETOUCH VERIO) strip TEST IN THE MORNING AND IN THE EVENING 200 each 1    allopurinol (ZYLOPRIM) 100 MG tablet Take 100 mg by mouth daily      Cholecalciferol (VITAMIN D) 50 MCG (2000 UT) CAPS capsule Take 2,000 Units by mouth daily      aspirin (ASPIR-LOW) 81 MG EC tablet TAKE ONE TABLET BY MOUTH EVERY DAY 90 tablet 1    atorvastatin (LIPITOR) 40 MG tablet TAKE ONE TABLET BY MOUTH DAILY 90 tablet 1    levothyroxine (SYNTHROID) 88 MCG tablet Take 1 tablet by mouth in the morning. 90 tablet 1    empagliflozin (JARDIANCE) 25 MG tablet Take 1 tablet by mouth daily 90 tablet 3    Misc. Devices MISC Please add portability to current O2 order. Resp to evaluate patient for OCD device. 1 each 0    OXYGEN Inhale 2.5 L into the lungs nightly (Patient taking differently: Inhale 2.5 L into the lungs nightly Spouse states uses  5 L/min via nc bedtime) 1 Device 99    acetaminophen (TYLENOL) 500 MG tablet Take 1,000 mg by mouth daily as needed for Pain          Medications patient taking as of now reconciled against medications ordered at time of hospital discharge:  Yes    A comprehensive review of systems was negative except for what was noted in the HPI.    Objective:    /61 (Site: Left Upper Arm, Position: Sitting, Cuff Size: Large Adult)   Pulse 73   Temp 97.6 °F (36.4 °C) (Temporal)   Resp 20   Ht 5' 7\" (1.702 m)   Wt 232 lb 1.6 oz (105.3 kg)   SpO2 96%   BMI 36.35 kg/m²   General Appearance: alert and oriented to person, place and time, well developed and well- nourished, in no acute distress  Skin: warm and dry, no rash or erythema  Head: normocephalic and atraumatic  Eyes: pupils equal, round, and reactive to light, extraocular eye movements intact, conjunctivae normal  ENT: tympanic membrane, external ear and ear canal normal bilaterally, nose without deformity, nasal mucosa and turbinates normal without polyps  Neck: supple and non-tender without mass, no thyromegaly or thyroid nodules, no cervical lymphadenopathy  Pulmonary/Chest: clear to auscultation bilaterally- no wheezes, rales or rhonchi, normal air movement, no respiratory distress  Cardiovascular: normal rate, regular rhythm, normal S1 and S2, no murmurs, rubs, clicks, or gallops, distal pulses intact, no carotid bruits  Abdomen: soft, non-tender, non-distended, normal bowel sounds, no masses or organomegaly  Extremities: no cyanosis, clubbing or edema  Musculoskeletal: normal range of motion, no joint swelling, deformity or tenderness  Neurologic: reflexes normal and symmetric, no cranial nerve deficit, gait, coordination and speech normal      An electronic signature was used to authenticate this note.  --Adriano Fabian MD

## 2023-01-19 NOTE — PROGRESS NOTES
[facility]    Attending Physician Statement  I have discussed the case, including pertinent history and exam findings with the resident physician. I have seen and examined the patient and the key elements of the encounter have been performed by me. I agree with the assessment, plan and orders as documented by the resident. I have reviewed all pertinent PMHx, PSHx, FamHx, SocialHx, medications, and allergies and updated history as appropriate. Patient here for hospital follow up. Discharge summaries from 12/24/22 and 1/7/23 reviewed. He is still off lisinopril, metoprolol, and gabapentin. CHF visit today - bumex resumed MWF. URG4 - now at baseline. Has not been taking insulin. Eating regularly, no nausea,vomiting. BS 110s. Continue holding insulin, continue monitoring BS - if >180 start lantus 20 units. Call clinic if persistent elevation of blood sugars after retarting lantus, discussed symptoms of hypoglycemia. Follow up with PCP in 2 weeks. Need HFU with Nephrology. Remainder of medical problems as per resident note. Gume Bauman MD  1/19/2023 3:35 PM

## 2023-01-23 ENCOUNTER — OFFICE VISIT (OUTPATIENT)
Dept: SURGERY | Age: 68
End: 2023-01-23
Payer: MEDICARE

## 2023-01-23 ENCOUNTER — TELEPHONE (OUTPATIENT)
Dept: SURGERY | Age: 68
End: 2023-01-23

## 2023-01-23 ENCOUNTER — PREP FOR PROCEDURE (OUTPATIENT)
Dept: SURGERY | Age: 68
End: 2023-01-23

## 2023-01-23 VITALS
WEIGHT: 232 LBS | BODY MASS INDEX: 36.41 KG/M2 | OXYGEN SATURATION: 94 % | DIASTOLIC BLOOD PRESSURE: 72 MMHG | RESPIRATION RATE: 16 BRPM | HEIGHT: 67 IN | HEART RATE: 88 BPM | SYSTOLIC BLOOD PRESSURE: 125 MMHG

## 2023-01-23 DIAGNOSIS — D49.2 SOFT TISSUE NEOPLASM: Primary | ICD-10-CM

## 2023-01-23 PROCEDURE — 1124F ACP DISCUSS-NO DSCNMKR DOCD: CPT | Performed by: SURGERY

## 2023-01-23 PROCEDURE — 3078F DIAST BP <80 MM HG: CPT | Performed by: SURGERY

## 2023-01-23 PROCEDURE — 99212 OFFICE O/P EST SF 10 MIN: CPT | Performed by: SURGERY

## 2023-01-23 PROCEDURE — 99213 OFFICE O/P EST LOW 20 MIN: CPT | Performed by: SURGERY

## 2023-01-23 PROCEDURE — 3074F SYST BP LT 130 MM HG: CPT | Performed by: SURGERY

## 2023-01-23 RX ORDER — PYRAZINAMIDE 500 MG/1
TABLET ORAL
COMMUNITY
Start: 2022-08-02 | End: 2023-01-24

## 2023-01-23 RX ORDER — CLOTRIMAZOLE AND BETAMETHASONE DIPROPIONATE 10; .5 MG/ML; MG/ML
1 LOTION TOPICAL
COMMUNITY
Start: 2021-03-22 | End: 2023-01-24

## 2023-01-23 RX ORDER — DOXYCYCLINE HYCLATE 100 MG/1
CAPSULE ORAL
COMMUNITY
Start: 2019-06-24

## 2023-01-23 NOTE — TELEPHONE ENCOUNTER
Scheduled patient for EXCISION OF SOFT TISSUE NEOPLASM LEFT SCALP 23 at [id number] [facility]. Patient needs to arrive at [address] [place] [address] at 12PM. Patient confirmed date and time, address and directions given via phone. Prep instructions mailed, patient understood. Prior Authorization Form:      DEMOGRAPHICS:                     Patient Name:  Prince Goodrich  Patient :  1955            Insurance:  Payor: [employer] / Plan: Lindsay Quezada ESSENTIAL/PLUS / Product Type: *No Product type* /   Insurance ID Number:    Payer/Plan Subscr  Sex Relation Sub. Ins. ID Effective Group Num   1.  [address] [facility] [address] 1955 Male Self [id number] [facility]                                   [address]         DIAGNOSIS & PROCEDURE:                       Procedure/Operation: EXCISION OF SOFT TISSUE NEOPLASM LEFT SCALP           CPT Code: 96845    Diagnosis:  SOFT TISSUE NEOPLASM    ICD10 Code: D49.2    [facility]  [facility]    Surgeon:  Ann Pittman    SCHEDULING INFORMATION:                          Date: 23    Time: 1PM              Anesthesia:  MAC/TIVA                                                       Status:  Outpatient        Special Comments:  N/A       Electronically signed by Kris Andrew MA on 2023 at 3:05 PM

## 2023-01-23 NOTE — PROGRESS NOTES
Pt here for wound check from excision of back cyst from 11/8/22. Wound dehisced and steri-strips applied at last appointment. Pt denies drainage from area. States he thinks it scabbed over. Denies pain in area. Pt also complains of skin lesion behind left ear. States it is scabbed over and has been present for about 2 years. States it is occasionally itchy. Unsure of change in size. Denies pain in area.     Past Medical History:   Diagnosis Date    RADHA (acute kidney injury) ([facility] 3/10/2022    Atrial fibrillation ([facility]     Carpal tunnel syndrome     Encounter regarding vascular access for dialysis for ESRD ([facility] 3/12/2022    Hyperlipidemia     Hypertension     Hypothyroidism     Lung nodule     Nocturnal hypoxemia due to obesity 8/8/2013    Obesity     NIKO (obstructive sleep apnea) 8/8/2013    [facility]    Osteoarthritis     Pinched nerve     Lumbar    Restrictive lung disease secondary to obesity 7/25/2016    Sinus node dysfunction (HCC)     Type II or unspecified type diabetes mellitus without mention of complication, not stated as uncontrolled        Past Surgical History:   Procedure Laterality Date    BACK SURGERY  2012    [facility]. Pinched nerve in back    BACK SURGERY  05/30/2017    BACK SURGERY N/A 11/8/2022    EXCISON OF SOFT TISSUE NEOPLASM OF UPPER BACK performed by Jennifer Barriga MD at [place] [address]  2007    multiple colon polyps    COLONOSCOPY  06/04/2012    COLONOSCOPY    COLONOSCOPY  04/26/2022    polyp; diverticula--sarah    COLONOSCOPY N/A 4/26/2022    COLONOSCOPY POLYPECTOMY HOT BIOPSY (Snare) performed by Jennifer Barriga MD at [address] [place] [address] cataracts implant    HERNIA REPAIR      umbilical    PACEMAKER PLACEMENT  10/03/2017    Medtronic dual chamber    Dr. Shaina Parker Right        Current Outpatient Medications   Medication Sig Dispense Refill    acetaminophen-codeine (TYLENOL #3) 300-30 MG per tablet Acetaminophen-Codeine Active TAB . PRN August 2nd, 2022 12:38pm      clotrimazole-betamethasone (LOTRISONE) 8-8.66 % lotion 1 application      doxycycline hyclate (VIBRAMYCIN) 100 MG capsule 1 tablet Orally bid for 7 days      bumetanide (BUMEX) 2 MG tablet Take 2 mg by mouth three times a week Mon-Wed-Fri      ferrous sulfate (IRON 325) 325 (65 Fe) MG tablet Take 1 tablet by mouth daily (with breakfast) 30 tablet 3    magnesium oxide (MAG-OX) 400 (240 Mg) MG tablet Take 1 tablet by mouth daily 30 tablet 0    apixaban (ELIQUIS) 5 MG TABS tablet Take 5 mg by mouth daily      metoprolol succinate (TOPROL XL) 25 MG extended release tablet Take 1 tablet by mouth daily 30 tablet 3    mometasone-formoterol (DULERA) 200-5 MCG/ACT inhaler Inhale 2 puffs into the lungs 2 times daily Rinse mouth after using. PAP medication. 3 each 2    Lancets MISC Give Delica lancets. Tests twice a day A.M. And P.M 200 each 1    blood glucose test strips (ONETOUCH VERIO) strip TEST IN THE MORNING AND IN THE EVENING 200 each 1    allopurinol (ZYLOPRIM) 100 MG tablet Take 100 mg by mouth daily      Cholecalciferol (VITAMIN D) 50 MCG (2000 UT) CAPS capsule Take 2,000 Units by mouth daily      aspirin (ASPIR-LOW) 81 MG EC tablet TAKE ONE TABLET BY MOUTH EVERY DAY 90 tablet 1    atorvastatin (LIPITOR) 40 MG tablet TAKE ONE TABLET BY MOUTH DAILY 90 tablet 1    empagliflozin (JARDIANCE) 25 MG tablet Take 1 tablet by mouth daily 90 tablet 3    Insulin Pen Needle (PEN NEEDLES) 32G X 6 MM MISC Take insulin daily 100 each 1    Misc. Devices MISC Please add portability to current O2 order. Resp to evaluate patient for OCD device. 1 each 0    Misc. Devices KIT Dispense 1 blood pressure cuff.  1 kit 0    OXYGEN Inhale 2.5 L into the lungs nightly (Patient taking differently: Inhale 2.5 L into the lungs nightly Spouse states uses  5 L/min via nc bedtime) 1 Device 99    acetaminophen (TYLENOL) 500 MG tablet Take 1,000 mg by mouth daily as needed for Pain gabapentin (NEURONTIN) 600 MG tablet Take 600 mg by mouth 2 times daily. (Patient not taking: No sig reported)      insulin glargine (BASAGLAR KWIKPEN) 100 UNIT/ML injection pen Inject 55 Units into the skin nightly PAP medication. (Patient not taking: No sig reported) 17 Adjustable Dose Pre-filled Pen Syringe 3    Insulin Pen Needle (PEN NEEDLES) 31G X 6 MM MISC Once daily. May substitute brand for insurance coverage. (Patient not taking: No sig reported) 100 each 3    insulin lispro, 1 Unit Dial, (HUMALOG KWIKPEN) 100 UNIT/ML SOPN Inject 20 Units into the skin 3 times daily (before meals) for 225 doses (Patient not taking: No sig reported) 27 mL 1    levothyroxine (SYNTHROID) 88 MCG tablet Take 1 tablet by mouth in the morning. 90 tablet 1     No current facility-administered medications for this visit.        No Known Allergies    Family History   Problem Relation Age of Onset    Cancer Mother         skin    Heart Disease Mother     High Blood Pressure Father     Amyotrophic lateral sclerosis Father     Cancer Brother     High Blood Pressure Brother     Diabetes Brother        Social History     Socioeconomic History    Marital status:      Spouse name: Not on file    Number of children: Not on file    Years of education: Not on file    Highest education level: Not on file   Occupational History    Not on file   Tobacco Use    Smoking status: Former     Packs/day: 0.10     Years: 35.00     Pack years: 3.50     Types: Cigarettes     Quit date: 2004     Years since quittin.1    Smokeless tobacco: Former     Quit date:    Vaping Use    Vaping Use: Never used   Substance and Sexual Activity    Alcohol use: No     Alcohol/week: 0.0 standard drinks    Drug use: No    Sexual activity: Yes     Partners: Female   Other Topics Concern    Not on file   Social History Narrative    Not on file     Social Determinants of Health     Financial Resource Strain: High Risk    Difficulty of Paying Living Expenses: Very hard   Food Insecurity: No Food Insecurity    Worried About Running Out of Food in the Last Year: Never true    Ran Out of Food in the Last Year: Never true   Transportation Needs: No Transportation Needs    Lack of Transportation (Medical): No    Lack of Transportation (Non-Medical): No   Physical Activity: Inactive    Days of Exercise per Week: 0 days    Minutes of Exercise per Session: 0 min   Stress: No Stress Concern Present    Feeling of Stress : Only a little   Social Connections:  Moderately Integrated    Frequency of Communication with Friends and Family: More than three times a week    Frequency of Social Gatherings with Friends and Family: Once a week    Attends Spiritism Services: Never    Active Member of Clubs or Organizations: Yes    Attends Club or Organization Meetings: 1 to 4 times per year    Marital Status:    Intimate Partner Violence: Not on file   Housing Stability: [address] to Pay for Housing in the Last Year: No    Number of [place] in the Last Year: 1    Unstable Housing in the Last Year: No       Vitals:    01/23/23 1226   BP: 125/72   Pulse: 88   Resp: 16   SpO2: 94%     Gen:  adult male in no distress  HEENT:  PERRL; EOMI; moist mucous membranes  Neck:  supple; FROM  Hrt:  regular rate/rhythm; no murmur  Lungs:  fairly clear bilaterally  Abd:  soft; BS active; NT/ND  Skin:  warm/dry; back incision with small scab; no erythema; no drainage; left ear lesions--2 cm x 1 cm skin lesion behind left ear; scab in place with some surrounding erythema  Ext:  moves all 4 ext    Patient Active Problem List    Diagnosis Date Noted    Acute metabolic encephalopathy 44/35/0214    Hypovolemia 01/07/2023    Hypovolemic shock ([address] 01/07/2023    Hyperkalemia 12/24/2022    Proteinuria due to type 2 diabetes mellitus ([address] 11/16/2022    Skin lesion of face 11/16/2022    Acute on chronic heart failure with preserved ejection fraction ([address] 08/22/2022    Stage 3b chronic kidney disease ([facility] 08/21/2022    Hypercalcemia 07/22/2022    Acute on chronic diastolic heart failure (HCC)     Normocytic anemia     Hypotension     Acute on chronic congestive heart failure ([facility] 07/07/2022    Chronic kidney disease, stage 2 (mild) 04/27/2022    At risk for colon cancer     Encounter regarding vascular access for dialysis for ESRD ([facility] 03/12/2022    RADHA (acute kidney injury) ([facility] 03/10/2022    Chronic hypercapnic respiratory failure ([facility] 09/21/2021    Acute heart failure with preserved ejection fraction (HFpEF) ([facility] 09/17/2021    Chronic obstructive pulmonary disease, unspecified COPD type ([facility] 09/13/2021    Diabetes mellitus type 2 in obese (HCC)     Persistent atrial fibrillation ([facility] 07/25/2018    Pacemaker     Sinus node dysfunction ([facility] 10/03/2017    Atypical atrial flutter ([facility] 09/07/2017    Restrictive lung disease secondary to obesity 07/25/2016    NIKO (obstructive sleep apnea) 08/08/2013    Hypoxemia 08/08/2013    Lung nodules 04/15/2013    Arthritis of shoulder region, left 04/15/2013    OA (osteoarthritis of the spine) 04/15/2013    S/P laminectomy with spinal fusion 04/15/2013    Colorectal polyps 04/15/2013    Diverticula of colon 04/15/2013    Essential hypertension 05/04/2011    Hypothyroidism 05/04/2011    DM (diabetes mellitus) ([facility] 01/31/2011    Class 2 obesity due to excess calories with serious comorbidity and body mass index (BMI) of 38.0 to 38.9 in adult 01/31/2011    Hyperlipidemia 01/31/2011     S/p excision of back cyst--cont local wound care    Soft tissue neoplasm behind left ear--recommend excision. The patient was explained the risks/benefits/alternatives/expected outcomes of the procedure. The patient was explained the risks, including, but not limited to, bleeding, infection, reaction to the anesthesia medicine, and recurrence. All questions were answered. The patient verbalized understanding and agreed to proceed.     Purvi Brooks MD, FACS  1/23/2023  12:59 PM        Amy Tineo MD, FACS  1/23/2023  12:52 PM

## 2023-01-24 NOTE — PROGRESS NOTES
[address]   PRE-ADMISSION TESTING GENERAL INSTRUCTIONS  [facility] Phone Number: [phone]      GENERAL INSTRUCTIONS:    [x] Antibacterial Soap Shower Night before or AM of surgery. [] CHG Wipes instruction sheet and wipes given. [] Hibiclens shower the night before and the morning of surgery.   -Do not use Hibiclens on your face or head. [x] Do not wear lotions, powders, deodorant. [x] Nothing by mouth after midnight; including gum, candy, mints, or water. [x] You may brush your teeth, gargle, but do NOT swallow water. [x] No tobacco products, illegal drugs, or alcohol within 24 hours of your surgery. [x] Jewelry or valuables should not be brought to the hospital. All body and/or tongue piercing's must be removed prior to arriving to hospital. No contact lens or hair pins. [x] Arrange transportation with a responsible adult  to and from the hospital. Arrange for someone to be with you for the remainder of the day and for 24 hours after your procedure due to having had anesthesia when discharged         -Who will be your  for transportation? Marilyn-wife or daughters         -Who will be staying with you for 24 hrs after your procedure? Marilyn-wife or daughters  [x] Bring insurance card and photo ID.  [] Bring copy of living will or healthcare power of  papers to be placed in your electronic record. [] Urine Pregnancy test will be preformed the day of surgery. A specimen sample may be brought from home. [] Transfusion Bracelet: Please bring with you to hospital, day of surgery. PARKING INSTRUCTIONS:     [x] ARRIVAL DATE & TIME:  1/26 at 11:30 am     [x] Enter into the [facility]. Two people may accompany you. Masks are not required but are recommended. [x] Parking Lot \"I\" is where you will park. It is located on the corner of [address] and [address]. The entrance is on [address].    Upon entering the parking lot, a voucher ticket will print    EDUCATION INSTRUCTIONS:        [] Knee or Hip replacement booklet & exercise pamphlets given. [] [address] placed in chart. [] Pre-admission Testing educational folder given  [] Incentive Spirometry,coughing & deep breathing exercises reviewed. [] Medication information sheet(s)   [] Fluoroscopy-Xray used in surgery reviewed with patient. Educational pamphlet placed in chart. [] Pain: Post-op pain is normal and to be expected. You will be asked to rate your pain from 0-10. [] Joint camp offered. [] Joint replacement booklets given.  [] Spine Navigator to see in PAT. [] Other:___________________________    MEDICATION INSTRUCTIONS:    [x] Bring a complete list of your medications, please write the last time you took the medicine, give this list to the nurse in Pre-Op. [x] Take only the following medications the morning of surgery with 1-2 ounces of water:  Toprol XL  [x] Stop all herbal supplements and vitamins 5 days before surgery. Stop NSAIDS 7 days before surgery. [x] DO NOT take any diabetic medicine the morning of surgery. Follow instructions for insulin the day before surgery. [x] If you are diabetic and your blood sugar is low or you feel symptomatic, you may drink 1-2 ounces of apple juice or take a glucose tablet.            -The morning of your procedure, you may call the pre-op area if you have concerns about your blood sugar [phone]. [x] Use your inhalers the morning of surgery. Bring your emergency inhaler with you day of surgery. [facility]  [x] Follow physician instructions regarding any blood thinners you may be taking. Aspirin last dose 1/25; Eliquis last dose 1/24 am     WHAT TO EXPECT:    [x] The day of surgery you will be greeted and checked in by the [employer].  In addition, you will be registered in the [place] by a Patient Access Representative. Please bring your photo ID and insurance card.  A nurse will greet you in accordance to the time you are needed in the pre-op area to prepare you for surgery. Please do not be discouraged if you are not greeted in the order you arrive as there are many variables that are involved in patient preparation. Your patience is greatly appreciated as you wait for your nurse. Please bring in items such as: books, magazines, newspapers, electronics, or any other items  to occupy your time in the waiting area. [x]  Delays may occur with surgery and staff will make a sincere effort to keep you informed of delays. If any delays occur with your procedure, we apologize ahead of time for your inconvenience as we recognize the value of your time.

## 2023-01-24 NOTE — PROGRESS NOTES
Spoke with Jas Dailey at Dr. Joel Parisi office regarding patient being on Aspirin 81 mg and Eliquis. Per Dr. Luis Chavarria, patient is to stop the Eliquis 3 days prior to surgery and hold the Aspirin the morning of surgery. Jas Dailey stated that patient should stop the Eliquis today.

## 2023-01-26 ENCOUNTER — ANESTHESIA (OUTPATIENT)
Dept: OPERATING ROOM | Age: 68
End: 2023-01-26
Payer: MEDICARE

## 2023-01-26 ENCOUNTER — HOSPITAL ENCOUNTER (OUTPATIENT)
Age: 68
Setting detail: OUTPATIENT SURGERY
Discharge: HOME | End: 2023-01-26
Attending: SURGERY | Admitting: SURGERY
Payer: MEDICARE

## 2023-01-26 ENCOUNTER — ANESTHESIA EVENT (OUTPATIENT)
Dept: OPERATING ROOM | Age: 68
End: 2023-01-26
Payer: MEDICARE

## 2023-01-26 VITALS
OXYGEN SATURATION: 98 % | RESPIRATION RATE: 16 BRPM | SYSTOLIC BLOOD PRESSURE: 144 MMHG | WEIGHT: 240 LBS | BODY MASS INDEX: 37.67 KG/M2 | HEIGHT: 67 IN | TEMPERATURE: 97.1 F | HEART RATE: 80 BPM | DIASTOLIC BLOOD PRESSURE: 81 MMHG

## 2023-01-26 DIAGNOSIS — D49.2 NEOPLASM OF SOFT TISSUE: ICD-10-CM

## 2023-01-26 DIAGNOSIS — Z01.812 PRE-OPERATIVE LABORATORY EXAMINATION: Primary | ICD-10-CM

## 2023-01-26 LAB — METER GLUCOSE: 91 MG/DL (ref 74–99)

## 2023-01-26 PROCEDURE — 2500000003 HC RX 250 WO HCPCS: Performed by: SURGERY

## 2023-01-26 PROCEDURE — 3600000012 HC SURGERY LEVEL 2 ADDTL 15MIN: Performed by: SURGERY

## 2023-01-26 PROCEDURE — 2580000003 HC RX 258: Performed by: NURSE ANESTHETIST, CERTIFIED REGISTERED

## 2023-01-26 PROCEDURE — 2500000003 HC RX 250 WO HCPCS: Performed by: ANESTHESIOLOGY

## 2023-01-26 PROCEDURE — 7100000010 HC PHASE II RECOVERY - FIRST 15 MIN: Performed by: SURGERY

## 2023-01-26 PROCEDURE — 2709999900 HC NON-CHARGEABLE SUPPLY: Performed by: SURGERY

## 2023-01-26 PROCEDURE — 3700000000 HC ANESTHESIA ATTENDED CARE: Performed by: SURGERY

## 2023-01-26 PROCEDURE — 82962 GLUCOSE BLOOD TEST: CPT

## 2023-01-26 PROCEDURE — 3700000001 HC ADD 15 MINUTES (ANESTHESIA): Performed by: SURGERY

## 2023-01-26 PROCEDURE — 7100000011 HC PHASE II RECOVERY - ADDTL 15 MIN: Performed by: SURGERY

## 2023-01-26 PROCEDURE — 3600000002 HC SURGERY LEVEL 2 BASE: Performed by: SURGERY

## 2023-01-26 PROCEDURE — 6360000002 HC RX W HCPCS: Performed by: NURSE ANESTHETIST, CERTIFIED REGISTERED

## 2023-01-26 PROCEDURE — 88304 TISSUE EXAM BY PATHOLOGIST: CPT

## 2023-01-26 RX ORDER — SODIUM CHLORIDE 0.9 % (FLUSH) 0.9 %
5-40 SYRINGE (ML) INJECTION PRN
Status: DISCONTINUED | OUTPATIENT
Start: 2023-01-26 | End: 2023-01-26 | Stop reason: HOSPADM

## 2023-01-26 RX ORDER — SODIUM CHLORIDE 0.9 % (FLUSH) 0.9 %
5-40 SYRINGE (ML) INJECTION EVERY 12 HOURS SCHEDULED
Status: DISCONTINUED | OUTPATIENT
Start: 2023-01-26 | End: 2023-01-26 | Stop reason: HOSPADM

## 2023-01-26 RX ORDER — SODIUM CHLORIDE 9 MG/ML
INJECTION, SOLUTION INTRAVENOUS PRN
Status: DISCONTINUED | OUTPATIENT
Start: 2023-01-26 | End: 2023-01-26 | Stop reason: HOSPADM

## 2023-01-26 RX ORDER — TRAMADOL HYDROCHLORIDE 50 MG/1
50 TABLET ORAL EVERY 6 HOURS PRN
Qty: 8 TABLET | Refills: 0 | Status: SHIPPED | OUTPATIENT
Start: 2023-01-26 | End: 2023-01-28

## 2023-01-26 RX ORDER — SODIUM CHLORIDE 9 MG/ML
INJECTION, SOLUTION INTRAVENOUS CONTINUOUS PRN
Status: DISCONTINUED | OUTPATIENT
Start: 2023-01-26 | End: 2023-01-26 | Stop reason: SDUPTHER

## 2023-01-26 RX ORDER — LIDOCAINE HYDROCHLORIDE AND EPINEPHRINE 10; 10 MG/ML; UG/ML
INJECTION, SOLUTION INFILTRATION; PERINEURAL PRN
Status: DISCONTINUED | OUTPATIENT
Start: 2023-01-26 | End: 2023-01-26 | Stop reason: ALTCHOICE

## 2023-01-26 RX ORDER — FENTANYL CITRATE 50 UG/ML
INJECTION, SOLUTION INTRAMUSCULAR; INTRAVENOUS PRN
Status: DISCONTINUED | OUTPATIENT
Start: 2023-01-26 | End: 2023-01-26 | Stop reason: SDUPTHER

## 2023-01-26 RX ORDER — CEFAZOLIN SODIUM 1 G/3ML
INJECTION, POWDER, FOR SOLUTION INTRAMUSCULAR; INTRAVENOUS PRN
Status: DISCONTINUED | OUTPATIENT
Start: 2023-01-26 | End: 2023-01-26 | Stop reason: SDUPTHER

## 2023-01-26 RX ORDER — DEXTROSE MONOHYDRATE 25 G/50ML
12.5 INJECTION, SOLUTION INTRAVENOUS ONCE
Status: COMPLETED | OUTPATIENT
Start: 2023-01-26 | End: 2023-01-26

## 2023-01-26 RX ORDER — PROPOFOL 10 MG/ML
INJECTION, EMULSION INTRAVENOUS PRN
Status: DISCONTINUED | OUTPATIENT
Start: 2023-01-26 | End: 2023-01-26 | Stop reason: SDUPTHER

## 2023-01-26 RX ORDER — ONDANSETRON 2 MG/ML
INJECTION INTRAMUSCULAR; INTRAVENOUS PRN
Status: DISCONTINUED | OUTPATIENT
Start: 2023-01-26 | End: 2023-01-26 | Stop reason: SDUPTHER

## 2023-01-26 RX ADMIN — PROPOFOL 50 MCG/KG/MIN: 10 INJECTION, EMULSION INTRAVENOUS at 15:23

## 2023-01-26 RX ADMIN — FENTANYL CITRATE 50 MCG: 50 INJECTION, SOLUTION INTRAMUSCULAR; INTRAVENOUS at 15:21

## 2023-01-26 RX ADMIN — FENTANYL CITRATE 50 MCG: 50 INJECTION, SOLUTION INTRAMUSCULAR; INTRAVENOUS at 15:31

## 2023-01-26 RX ADMIN — ONDANSETRON 4 MG: 2 INJECTION INTRAMUSCULAR; INTRAVENOUS at 15:19

## 2023-01-26 RX ADMIN — CEFAZOLIN 2 G: 2 INJECTION, POWDER, FOR SOLUTION INTRAMUSCULAR; INTRAVENOUS at 15:19

## 2023-01-26 RX ADMIN — DEXTROSE MONOHYDRATE 12.5 G: 25 INJECTION, SOLUTION INTRAVENOUS at 12:19

## 2023-01-26 RX ADMIN — SODIUM CHLORIDE: 9 INJECTION, SOLUTION INTRAVENOUS at 15:12

## 2023-01-26 RX ADMIN — PROPOFOL 50 MG: 10 INJECTION, EMULSION INTRAVENOUS at 15:21

## 2023-01-26 ASSESSMENT — PAIN - FUNCTIONAL ASSESSMENT: PAIN_FUNCTIONAL_ASSESSMENT: NONE - DENIES PAIN

## 2023-01-26 ASSESSMENT — PAIN SCALES - GENERAL: PAINLEVEL_OUTOF10: 0

## 2023-01-26 NOTE — ANESTHESIA POSTPROCEDURE EVALUATION
Department of Anesthesiology  Postprocedure Note    Patient: Callum Carrera  MRN: [id number]  [place]: 1955  Date of evaluation: 1/26/2023      Procedure Summary     Date: 01/26/23 Room / Location: [place] [address] / [facility]    Anesthesia Start: 5301 Anesthesia Stop: 1282    Procedure: BACK LESION EXCISION SKIN GRAFT (Left) Diagnosis:       Neoplasm of soft tissue      (Neoplasm of soft tissue [D49.2])    Surgeons: Elliot Torres MD Responsible Provider: Court Collet, MD    Anesthesia Type: MAC ASA Status: 3          Anesthesia Type: MAC    Mando Phase I: Mando Score: 10    Mando Phase II: Mando Score: 10      Anesthesia Post Evaluation    Patient location during evaluation: PACU  Patient participation: complete - patient participated  Level of consciousness: awake  Pain score: 3  Airway patency: patent  Nausea & Vomiting: no nausea and no vomiting  Complications: no  Cardiovascular status: blood pressure returned to baseline  Respiratory status: acceptable  Hydration status: euvolemic

## 2023-01-26 NOTE — H&P
INTERVAL H&P    Most recent History and Physical/note was reviewed. Patient seen and examined. No significant changes or updates. Plan to proceed to OR for Exicsional biopsy of left post- auricular skin lesion. The risks, benefits, expected outcomes, and alternatives have been discussed, and patient understands and consents to proceed with the procedure. Renée Schreiber DO  Resident, PGY-1  1/26/2023  12:13 PM    Pt here for wound check from excision of back cyst from 11/8/22. Wound dehisced and steri-strips applied at last appointment. Pt denies drainage from area. States he thinks it scabbed over. Denies pain in area. Pt also complains of skin lesion behind left ear. States it is scabbed over and has been present for about 2 years. States it is occasionally itchy. Unsure of change in size. Denies pain in area.     Past Medical History:   Diagnosis Date    RADHA (acute kidney injury) ([facility] 3/10/2022    Atrial fibrillation ([facility] [address]     Carpal tunnel syndrome     Encounter regarding vascular access for dialysis for ESRD ([facility] [address] 3/12/2022    Hyperlipidemia     Hypertension     Hypothyroidism     Lung nodule     Nocturnal hypoxemia due to obesity 8/8/2013    Obesity     NIKO (obstructive sleep apnea) 8/8/2013    [facility]    Osteoarthritis     Pinched nerve     Lumbar    Restrictive lung disease secondary to obesity 7/25/2016    Sinus node dysfunction (HCC)     Type II or unspecified type diabetes mellitus without mention of complication, not stated as uncontrolled        Past Surgical History:   Procedure Laterality Date    BACK SURGERY  2012    [facility]. Pinched nerve in back    BACK SURGERY  05/30/2017    BACK SURGERY N/A 11/8/2022    EXCISON OF SOFT TISSUE NEOPLASM OF UPPER BACK performed by Jennifer Barriga MD at [address] [place] [address]  2007    multiple colon polyps    COLONOSCOPY  06/04/2012    COLONOSCOPY    COLONOSCOPY  04/26/2022    polyp; diverticula--sarah    COLONOSCOPY N/A 2022    COLONOSCOPY POLYPECTOMY HOT BIOPSY ([facility]) performed by Ashley Kuhn MD at [address]      lennie cataracts implant    HERNIA REPAIR      umbilical    PACEMAKER PLACEMENT  10/03/2017    Medtronic dual chamber    Dr. Whelan Agent Right        Current Facility-Administered Medications   Medication Dose Route Frequency Provider Last Rate Last Admin    sodium chloride flush 0.9 % injection 5-40 mL  5-40 mL IntraVENous 2 times per day Ashley Kuhn MD        sodium chloride flush 0.9 % injection 5-40 mL  5-40 mL IntraVENous PRN Ashley Kuhn MD        0.9 % sodium chloride infusion   IntraVENous PRN Ashley Kuhn MD        ceFAZolin (ANCEF) 2,000 mg in sterile water 20 mL IV syringe  2,000 mg IntraVENous On Call to Natty Shah MD           No Known Allergies    Family History   Problem Relation Age of Onset    Cancer Mother         skin    Heart Disease Mother     High Blood Pressure Father     Amyotrophic lateral sclerosis Father     Cancer Brother     High Blood Pressure Brother     Diabetes Brother        Social History     Socioeconomic History    Marital status:      Spouse name: Not on file    Number of children: Not on file    Years of education: Not on file    Highest education level: Not on file   Occupational History    Not on file   Tobacco Use    Smoking status: Former     Packs/day: 0.10     Years: 35.00     Pack years: 3.50     Types: Cigarettes     Quit date: 2004     Years since quittin.1    Smokeless tobacco: Former     Quit date:    Vaping Use    Vaping Use: Never used   Substance and Sexual Activity    Alcohol use: No     Alcohol/week: 0.0 standard drinks    Drug use: No    Sexual activity: Yes     Partners: Female   Other Topics Concern    Not on file   Social History Narrative    Not on file     Social Determinants of Health     Financial Resource Strain: High Risk    Difficulty of Paying Living Expenses: Very hard   Food Insecurity: No Food Insecurity    Worried About Running Out of Food in the Last Year: Never true    Ran Out of Food in the Last Year: Never true   Transportation Needs: No Transportation Needs    Lack of Transportation (Medical): No    Lack of Transportation (Non-Medical): No   Physical Activity: Inactive    Days of Exercise per Week: 0 days    Minutes of Exercise per Session: 0 min   Stress: No Stress Concern Present    Feeling of Stress : Only a little   Social Connections:  Moderately Integrated    Frequency of Communication with Friends and Family: More than three times a week    Frequency of Social Gatherings with Friends and Family: Once a week    Attends [employer]ical Services: Never    Active Member of Clubs or Organizations: Yes    Attends Club or Organization Meetings: 1 to 4 times per year    Marital Status:    Intimate Partner Violence: Not on file   Housing Stability: [address] to Pay for Housing in the Last Year: No    Number of [place] in the Last Year: 1    Unstable Housing in the Last Year: No       Vitals:    01/26/23 1135   BP: (!) 112/57   Pulse: 87   Resp: 20   Temp: 98.2 °F (36.8 °C)   SpO2: 95%     Gen:  adult male in no distress  HEENT:  PERRL; EOMI; moist mucous membranes  Neck:  supple; FROM  Hrt:  regular rate/rhythm; no murmur  Lungs:  fairly clear bilaterally  Abd:  soft; BS active; NT/ND  Skin:  warm/dry; back incision with small scab; no erythema; no drainage; left ear lesions--2 cm x 1 cm skin lesion behind left ear; scab in place with some surrounding erythema  Ext:  moves all 4 ext    Patient Active Problem List    Diagnosis Date Noted    Acute metabolic encephalopathy 82/21/9798    Hypovolemia 01/07/2023    Hypovolemic shock ([address] 01/07/2023    Hyperkalemia 12/24/2022    Proteinuria due to type 2 diabetes mellitus ([address] 11/16/2022    Skin lesion of face 11/16/2022    Acute on chronic heart failure with preserved ejection fraction ([address] 08/22/2022    Stage 3b chronic kidney disease ([facility] [address] 08/21/2022    Hypercalcemia 07/22/2022    Acute on chronic diastolic heart failure (HCC)     Normocytic anemia     Hypotension     Acute on chronic congestive heart failure ([address] 07/07/2022    Chronic kidney disease, stage 2 (mild) 04/27/2022    At risk for colon cancer     Neoplasm of soft tissue 01/23/2023    Encounter regarding vascular access for dialysis for ESRD ([facility] [address] 03/12/2022    RADHA (acute kidney injury) ([facility] [address] 03/10/2022    Chronic hypercapnic respiratory failure ([address] 09/21/2021    Acute heart failure with preserved ejection fraction (HFpEF) ([facility] [address] 09/17/2021    Chronic obstructive pulmonary disease, unspecified COPD type ([facility] [address] 09/13/2021    Diabetes mellitus type 2 in obese (HCC)     Persistent atrial fibrillation ([facility] [address] 07/25/2018    Pacemaker     Sinus node dysfunction ([facility] [address] 10/03/2017    Atypical atrial flutter ([facility] [address] 09/07/2017    Restrictive lung disease secondary to obesity 07/25/2016    NIKO (obstructive sleep apnea) 08/08/2013    Hypoxemia 08/08/2013    Lung nodules 04/15/2013    Arthritis of shoulder region, left 04/15/2013    OA (osteoarthritis of the spine) 04/15/2013    S/P laminectomy with spinal fusion 04/15/2013    Colorectal polyps 04/15/2013    Diverticula of colon 04/15/2013    Essential hypertension 05/04/2011    Hypothyroidism 05/04/2011    DM (diabetes mellitus) ([facility] [address] 01/31/2011    Class 2 obesity due to excess calories with serious comorbidity and body mass index (BMI) of 38.0 to 38.9 in adult 01/31/2011    Hyperlipidemia 01/31/2011     S/p excision of back cyst--cont local wound care    Soft tissue neoplasm behind left ear--recommend excision. The patient was explained the risks/benefits/alternatives/expected outcomes of the procedure. The patient was explained the risks, including, but not limited to, bleeding, infection, reaction to the anesthesia medicine, and recurrence. All questions were answered.   The patient verbalized understanding and agreed to proceed.     Chepe Wiley MD, FACS  1/23/2023  12:59 PM

## 2023-01-26 NOTE — ANESTHESIA PRE PROCEDURE
Department of Anesthesiology  Preprocedure Note       Name:  Ilia Jefferson   Age:  69 y.o.  :  1955                                          MRN:  [id number]         Date:  2023      Surgeon: Flory Whipple):  Travis Awad MD    Procedure: Procedure(s):  BACK LESION EXCISION SKIN GRAFT    Medications prior to admission:   Prior to Admission medications    Medication Sig Start Date End Date Taking? Authorizing Provider   doxycycline hyclate (VIBRAMYCIN) 100 MG capsule Last dose will be   Patient not taking: Reported on 2023   Historical Provider, MD   bumetanide (BUMEX) 2 MG tablet Take 2 mg by mouth three times a week Mon-Wed-Fri    Historical Provider, MD   gabapentin (NEURONTIN) 600 MG tablet Take 600 mg by mouth 2 times daily. Patient not taking: No sig reported    Historical Provider, MD   ferrous sulfate (IRON 325) 325 (65 Fe) MG tablet Take 1 tablet by mouth daily (with breakfast) 23   Hyun Clements MD   magnesium oxide (MAG-OX) 400 (240 Mg) MG tablet Take 1 tablet by mouth daily 23   Hyun Clements MD   apixaban (ELIQUIS) 5 MG TABS tablet Take 5 mg by mouth 2 times daily    Historical Provider, MD   metoprolol succinate (TOPROL XL) 25 MG extended release tablet Take 1 tablet by mouth daily 22   Belem Toro MD   insulin glargine [facility] AUTHORITY KWIKPEN) 100 UNIT/ML injection pen Inject 55 Units into the skin nightly PAP medication. Patient not taking: No sig reported 22   Janes Arnold, DO   mometasone-formoterol [facility]) 200-5 MCG/ACT inhaler Inhale 2 puffs into the lungs 2 times daily Rinse mouth after using. PAP medication. 22   Kojo Posey MD   Insulin Pen Needle (PEN NEEDLES) 31G X 6 MM MISC Once daily. May substitute brand for insurance coverage. Patient not taking: No sig reported 22   Rylan Hess MD   Lancets MISC Give Delica lancets. Tests twice a day A.M.  And P.M 22   Rylan Hess MD   blood glucose test strips (ONETOUCH VERIO) strip TEST IN THE MORNING AND IN THE EVENING 11/16/22   Kt Gary MD   insulin lispro, 1 Unit Dial, [facility]) 100 UNIT/ML SOPN Inject 20 Units into the skin 3 times daily (before meals) for 225 doses  Patient not taking: No sig reported 11/10/22 1/26/23  Emerson Elam DO   allopurinol (ZYLOPRIM) 100 MG tablet Take 100 mg by mouth daily    Historical Provider, MD   Cholecalciferol (VITAMIN D) 50 MCG (2000 UT) CAPS capsule Take 2,000 Units by mouth daily    Historical Provider, MD   aspirin (ASPIR-LOW) 81 MG EC tablet TAKE ONE TABLET BY MOUTH EVERY DAY 8/31/22   Harshil Silver MD   atorvastatin (LIPITOR) 40 MG tablet TAKE ONE TABLET BY MOUTH DAILY 7/18/22   Lee Hamm MD   levothyroxine (SYNTHROID) 88 MCG tablet Take 1 tablet by mouth in the morning. 7/18/22 1/26/23  Lee Hamm MD   empagliflozin (JARDIANCE) 25 MG tablet Take 1 tablet by mouth daily  Patient not taking: Reported on 1/24/2023 6/23/22 6/23/23  Deja Hidalgo DO   Insulin Pen Needle (PEN NEEDLES) 32G X 6 MM MISC Take insulin daily 2/28/22   Kt Gary MD   Misc. Devices MISC Please add portability to current O2 order. Resp to evaluate patient for OCD device. 9/15/21   Lupe Redman MD   Misc.  Devices KIT Dispense 1 blood pressure cuff. 2/23/21   Nader Almazan MD   OXYGEN Inhale 2.5 L into the lungs nightly  Patient taking differently: Inhale 2.5 L into the lungs nightly Wife states patient uses 5 L/nc at night 7/28/20   Raghav Valle MD   acetaminophen (TYLENOL) 500 MG tablet Take 1,000 mg by mouth daily as needed for Pain    Historical Provider, MD       Current medications:    Current Facility-Administered Medications   Medication Dose Route Frequency Provider Last Rate Last Admin    sodium chloride flush 0.9 % injection 5-40 mL  5-40 mL IntraVENous 2 times per day Chepe Wiley MD        sodium chloride flush 0.9 % injection 5-40 mL  5-40 mL IntraVENous PRN Chepe Wiley MD        0.9 % sodium chloride infusion   IntraVENous PRN Shay Chan MD        ceFAZolin (ANCEF) 2,000 mg in sterile water 20 mL IV syringe  2,000 mg IntraVENous On Call to [address] [place] [address] Street, MD           Allergies:  No Known Allergies    Problem List:    Patient Active Problem List   Diagnosis Code    DM (diabetes mellitus) ([facility] E11.9    Class 2 obesity due to excess calories with serious comorbidity and body mass index (BMI) of 38.0 to 38.9 in adult [id number]    Hyperlipidemia E78.5    Essential hypertension I10    Hypothyroidism E03.9    Lung nodules R91.8    Arthritis of shoulder region, left M19.012    OA (osteoarthritis of the spine) M47.9    S/P laminectomy with spinal fusion Z98.1    Colorectal polyps K63.5, K62.1    Diverticula of colon K57.30    NIKO (obstructive sleep apnea) G47.33    Hypoxemia R09.02    Restrictive lung disease secondary to obesity J98.4, E66.9    Atypical atrial flutter (HCC) I48.4    Sinus node dysfunction (HCC) I49.5    Pacemaker Z95.0    Persistent atrial fibrillation ([facility] I48.19    Diabetes mellitus type 2 in obese (HCC) E11.69, E66.9    Chronic obstructive pulmonary disease, unspecified COPD type ([facility] J44.9    Acute heart failure with preserved ejection fraction (HFpEF) ([facility]) I50.31    Chronic hypercapnic respiratory failure ([facility] J96.12    RADHA (acute kidney injury) ([facility] N17.9    Encounter regarding vascular access for dialysis for ESRD ([facility] N18.6, Z99.2    At risk for colon cancer Z91.89    Acute on chronic congestive heart failure (HCC) I50.9    Acute on chronic diastolic heart failure (HCC) I50.33    Normocytic anemia D64.9    Hypotension I95.9    Stage 3b chronic kidney disease (HCC) N18.32    Acute on chronic heart failure with preserved ejection fraction (HCC) I50.33    Chronic kidney disease, stage 2 (mild) N18.2    Hypercalcemia E83.52    Proteinuria due to type 2 diabetes mellitus (HCC) E11.29, R80.9    Skin lesion of face L98.9    Hyperkalemia E87.5    Hypovolemia E86.1    Hypovolemic shock (HCC) R57.1    Acute metabolic encephalopathy E93.52    Neoplasm of soft tissue D49.2       Past Medical History:        Diagnosis Date    RADHA (acute kidney injury) ([facility] 3/10/2022    Atrial fibrillation ([facility]     Carpal tunnel syndrome     Encounter regarding vascular access for dialysis for ESRD ([facility] 3/12/2022    Hyperlipidemia     Hypertension     Hypothyroidism     Lung nodule     Nocturnal hypoxemia due to obesity 2013    Obesity     NIKO (obstructive sleep apnea) 2013    [facility]    Osteoarthritis     Pinched nerve     Lumbar    Restrictive lung disease secondary to obesity 2016    Sinus node dysfunction (HCC)     Type II or unspecified type diabetes mellitus without mention of complication, not stated as uncontrolled        Past Surgical History:        Procedure Laterality Date    BACK SURGERY      [facility]. Janette Charm nerve in back    BACK SURGERY  2017    BACK SURGERY N/A 2022    EXCISON OF SOFT TISSUE NEOPLASM OF UPPER BACK performed by Adam Virk MD at [address] [place] [address]    multiple colon polyps    COLONOSCOPY  2012    COLONOSCOPY    COLONOSCOPY  2022    polyp; diverticula--sarah    COLONOSCOPY N/A 2022    COLONOSCOPY POLYPECTOMY HOT BIOPSY (Snare) performed by Adam Virk MD at [address] [place] [address] cataracts implant    HERNIA REPAIR      umbilical    PACEMAKER PLACEMENT  10/03/2017    Medtronic dual chamber    Dr. Jennine Fabry Right        Social History:    Social History     Tobacco Use    Smoking status: Former     Packs/day: 0.10     Years: 35.00     Pack years: 3.50     Types: Cigarettes     Quit date: 2004     Years since quittin.1    Smokeless tobacco: Former     Quit date:    Substance Use Topics    Alcohol use: No     Alcohol/week: 0.0 standard drinks Counseling given: Not Answered      Vital Signs (Current):   Vitals:    01/24/23 1511 01/26/23 1135   BP:  (!) 112/57   Pulse:  87   Resp:  20   Temp:  36.8 °C (98.2 °F)   SpO2:  95%   Weight: 240 lb (108.9 kg) 240 lb (108.9 kg)   Height: 5' 7\" (1.702 m) 5' 7\" (1.702 m)                                              BP Readings from Last 3 Encounters:   01/26/23 (!) 112/57   01/23/23 125/72   01/19/23 130/62       NPO Status: Time of last liquid consumption: 2300                        Time of last solid consumption: 2000                        Date of last liquid consumption: 01/25/23                        Date of last solid food consumption: 01/25/23    BMI:   Wt Readings from Last 3 Encounters:   01/26/23 240 lb (108.9 kg)   01/23/23 232 lb (105.2 kg)   01/19/23 232 lb (105.2 kg)     Body mass index is 37.59 kg/m². CBC:   Lab Results   Component Value Date/Time    WBC 12.3 01/11/2023 03:44 AM    RBC 3.92 01/11/2023 03:44 AM    HGB 11.3 01/11/2023 03:44 AM    HCT 36.0 01/11/2023 03:44 AM    MCV 91.8 01/11/2023 03:44 AM    RDW 16.5 01/11/2023 03:44 AM     01/11/2023 03:44 AM       CMP:   Lab Results   Component Value Date/Time     01/19/2023 10:00 AM    K 4.6 01/19/2023 10:00 AM    K 5.1 01/08/2023 05:35 AM    CL 97 01/19/2023 10:00 AM    CO2 29 01/19/2023 10:00 AM    BUN 17 01/19/2023 10:00 AM    CREATININE 1.3 01/19/2023 10:00 AM    GFRAA >60 10/03/2022 10:55 AM    LABGLOM 59 01/19/2023 10:00 AM    GLUCOSE 143 01/19/2023 10:00 AM    GLUCOSE 133 04/18/2012 08:43 AM    PROT 6.7 01/08/2023 05:35 AM    CALCIUM 9.5 01/19/2023 10:00 AM    BILITOT 0.4 01/08/2023 05:35 AM    ALKPHOS 114 01/08/2023 05:35 AM    AST 16 01/08/2023 05:35 AM    ALT 29 01/08/2023 05:35 AM       POC Tests: No results for input(s): POCGLU, POCNA, POCK, POCCL, POCBUN, POCHEMO, POCHCT in the last 72 hours.     Coags:   Lab Results   Component Value Date/Time    PROTIME 22.9 12/24/2022 05:31 PM    INR 2.1 12/24/2022 05:31 PM    APTT 34.4 03/11/2022 02:27 PM       HCG (If Applicable): No results found for: PREGTESTUR, PREGSERUM, HCG, HCGQUANT     ABGs: No results found for: PHART, PO2ART, XUI0SLW, OPM8XPK, BEART, S4WFEUAZ     Type & Screen (If Applicable):  No results found for: LABABO, LABRH    Drug/Infectious Status (If Applicable):  No results found for: HIV, HEPCAB    COVID-19 Screening (If Applicable):   Lab Results   Component Value Date/Time    COVID19 Not Detected 12/25/2022 12:42 AM     CT ABDOMEN PELVIS WO CONTRAST Additional Contrast? None    Result Date: 1/7/2023  EXAMINATION: CT OF THE ABDOMEN AND PELVIS WITHOUT CONTRAST 1/7/2023 3:23 pm TECHNIQUE: CT of the abdomen and pelvis was performed without the administration of intravenous contrast. Multiplanar reformatted images are provided for review. Automated exposure control, iterative reconstruction, and/or weight based adjustment of the mA/kV was utilized to reduce the radiation dose to as low as reasonably achievable. COMPARISON: 12/24/2022 HISTORY: ORDERING SYSTEM PROVIDED HISTORY: lower back pain TECHNOLOGIST PROVIDED HISTORY: Reason for exam:->lower back pain Additional Contrast?->None FINDINGS: Lower Chest:  Visualized portion of the lower chest demonstrates no acute abnormality. Cardiomegaly is partially visualized. Organs: Nonenhanced liver is unremarkable. No biliary ductal dilatation seen. The gallbladder is unremarkable. Previously seen peripancreatic inflammation extending into the central mesentery has significantly improved with a minimal amount remaining. The spleen and adrenal glands are unremarkable. No hydronephrosis is identified. No renal calculi are seen. GI/Bowel: Stomach and duodenal sweep demonstrate no acute abnormality. There is no evidence of bowel obstruction. No evidence of abnormal bowel wall thickening or distension. There is diverticulosis without evidence of diverticulitis. The appendix is visualized and is unremarkable.   No evidence of acute appendicitis. Pelvis: Small focus of intraluminal gas is noted in the bladder likely related to recent instrumentation. No acute abnormality of the prostate. Peritoneum/Retroperitoneum: No evidence of ascites or free air. No evidence of lymphadenopathy. Aorta is normal in caliber. Bones/Soft Tissues:  No acute abnormality of the visualized osseous structures. Stable lumbar postsurgical changes from L3-L5 without evidence of complication. No CT etiology for patient's acute lower back pain identified. Stable L3-L5 fusion changes with laminectomies and fixation. Significant decrease in previously seen peripancreatic and central mesenteric inflammatory change with a minimal amount remaining. CT HEAD WO CONTRAST    Result Date: 1/8/2023  EXAMINATION: CT OF THE HEAD WITHOUT CONTRAST  1/8/2023 5:04 am TECHNIQUE: CT of the head was performed without the administration of intravenous contrast. Automated exposure control, iterative reconstruction, and/or weight based adjustment of the mA/kV was utilized to reduce the radiation dose to as low as reasonably achievable. COMPARISON: None. HISTORY: ORDERING SYSTEM PROVIDED HISTORY: AMS TECHNOLOGIST PROVIDED HISTORY: Reason for exam:->AMS Has a \"code stroke\" or \"stroke alert\" been called? ->No FINDINGS: BRAIN/VENTRICLES: There is no acute intracranial hemorrhage, mass effect or midline shift. No abnormal extra-axial fluid collection. The gray-white differentiation is maintained without evidence of an acute infarct. There is no evidence of hydrocephalus. Mild atrophy and periventricular white matter degenerative change. ORBITS: The visualized portion of the orbits demonstrate no acute abnormality. SINUSES: The visualized paranasal sinuses and mastoid air cells demonstrate no acute abnormality. SOFT TISSUES/SKULL:  No acute abnormality of the visualized skull or soft tissues. No acute intracranial abnormality.  RECOMMENDATIONS: Careful clinical correlation and follow up recommended. XR CHEST PORTABLE    Result Date: 1/10/2023  EXAMINATION: ONE XRAY VIEW OF THE CHEST 1/10/2023 9:57 am COMPARISON: 7 January 2023 HISTORY: ORDERING SYSTEM PROVIDED HISTORY: Increased shortness of breath TECHNOLOGIST PROVIDED HISTORY: Reason for exam:->Increased shortness of breath FINDINGS: Stable cardiomegaly and bilateral pleural thickening. Unchanged left-sided pacemaker. No new abnormal findings. Unchanged cardiomegaly and bilateral pleural thickening. XR CHEST PORTABLE    Result Date: 1/7/2023  EXAMINATION: ONE XRAY VIEW OF THE CHEST 1/7/2023 9:55 pm COMPARISON: AP chest, 01/07/2023; AP chest, 12/26/2022 HISTORY: ORDERING SYSTEM PROVIDED HISTORY: confirm right IJ placement TECHNOLOGIST PROVIDED HISTORY: Reason for exam:->confirm right IJ placement FINDINGS: Lines, tubes, and devices: Right internal jugular central line with tip terminating at level of the proximal superior vena cava. Pacemaker is superimposed over left axilla with leads extending to right atrium and right ventricle. Lungs and pleura: There is bilateral lung hyperinflation which may have association with COPD/emphysema. No focal consolidation. No pleural effusion. No pneumothorax. Cardiomediastinal silhouette: The mediastinum is stable. The heart is mildly enlarged. Bones and soft tissues: There are total bilateral shoulder joint arthroplasties. Right IJ central line tip localized at proximal SVC. Mild cardiomegaly. XR CHEST PORTABLE    Result Date: 1/7/2023  EXAMINATION: ONE XRAY VIEW OF THE CHEST 1/7/2023 4:46 pm COMPARISON: 12/26/2022. HISTORY: ORDERING SYSTEM PROVIDED HISTORY: cough TECHNOLOGIST PROVIDED HISTORY: Reason for exam:->cough FINDINGS: There is a pacemaker/defibrillator. The cardiomediastinal silhouette is enlarged. The pulmonary vasculature is within normal limits. No segmental or lobar pulmonary consolidation or collapse is identified.   No pneumothorax or pleural effusion is seen.  The left shoulder has been replaced. Enlarged cardiomediastinal silhouette. No visible acute pulmonary disease. TTE  Summary   Normal left ventricular chamber size. Normal left ventricular systolic function, EF 74%. Mild left ventricular concentric hypertrophy noted. Stage II diastolic dysfunction. Left atrium is enlarged. Interatrial septum not well visualized but appears intact. Normal right ventricle size and function, TAPSE 1.9cm. Pacemaker lead noted. Trace of mitral regurgitation present. No mitral valve prolapse. The aortic valve appears moderately sclerotic. No hemodynamically significant aortic stenosis. There is mild to moderate tricuspid regurgitation. Moderate Pulmonary hypertension, RVSP 54mmHg. Normal aortic root size. No evidence of pericardial effusion. No intra cardiac mass or thrombus. Compared to prior echo from 9/20/2021.       Signature      ----------------------------------------------------------------   Electronically signed by Kofi Freeman MD(Interpreting   physician) on 08/23/2022 07:34 PM      EKG 12 Lead  Order: [id number]   Status: Final result     Visible to patient: No (not released)     Next appt: 02/02/2023 at 09:00 AM in Internal Medicine [place] MD Letty)     0 Result Notes  Component Ref Range & Units 1/7/23 1628 12/24/22 1735 8/22/22 1239 7/7/22 1118 3/11/22 0647 9/23/21 1114 9/19/21 0205   Ventricular Rate BPM 96  142  73  76  60  60  65    Atrial Rate BPM 75  142  78  78  77  59  65    QRS Duration ms 156  136  158  162  160  148  132    Q-T Interval ms 398  132  444  458  460  464  428    QTc Calculation (Bazett) ms 502  203  489  515  460  464  445    R Axis degrees -92  -85  -76  -77  -80  99  127    T Axis degrees 82  0  91  85  80   19    Resulting Agency  [facility].           Narrative & Impression    Ventricular-paced rhythm with premature ventricular or aberrantly conducted complexes  Abnormal ECG  No previous ECGs available  Confirmed by Feliciano Cabral ([id number]) on 1/8/2023 6:11:33 PM               Anesthesia Evaluation  Patient summary reviewed and Nursing notes reviewed no history of anesthetic complications:   Airway: Mallampati: III  TM distance: >3 FB   Neck ROM: full  Mouth opening: > = 3 FB   Dental:          Pulmonary:normal exam    (+) COPD:  sleep apnea:  decreased breath sounds                           ROS comment: Lung nodules   Cardiovascular:    (+) hypertension:, valvular problems/murmurs (TR):, pacemaker: pacemaker, dysrhythmias: atrial flutter and atrial fibrillation, CHF:, pulmonary hypertension: moderate, hyperlipidemia      ECG reviewed    Rate: normal  Echocardiogram reviewed         Beta Blocker:  Dose within 24 Hrs         Neuro/Psych:   Negative Neuro/Psych ROS              GI/Hepatic/Renal:   (+) renal disease: CRI,           Endo/Other:    (+) DiabetesType II DM, using insulin, hypothyroidism::., electrolyte abnormalities, . Abdominal:             Vascular: negative vascular ROS. Other Findings:           Anesthesia Plan      MAC     ASA 3       Induction: intravenous. MIPS: Prophylactic antiemetics administered. Anesthetic plan and risks discussed with patient. Plan discussed with attending. JACQUELINE Caputo - CRNA   1/26/2023      Pt seen, examined, chart reviewed, plan discussed.   Marely Benavides MD  1/26/2023  3:13 PM

## 2023-01-26 NOTE — OP NOTE
Operative Note      Patient: eXnia Mayorga  YOB: 1955  MRN: [id number]    Date of Procedure: 1/26/2023    Pre-Op Diagnosis: Neoplasm of soft tissue [D49.2]    Post-Op Diagnosis: Same       Procedure(s):  BACK LESION EXCISION SKIN GRAFT    Surgeon(s):  Chepe Wiley MD    Assistant:   Resident: Trey Garcia DO    Anesthesia: Monitor Anesthesia Care    Estimated Blood Loss (mL): Minimal    Complications: None    Specimens:   ID Type Source Tests Collected by Time Destination   A : A. LEFT POSTERIOR SCALP Tissue Tissue SURGICAL PATHOLOGY Chepe Wiley MD 1/26/2023 1542        Implants:  * No implants in log *      Drains:   [REMOVED] Urinary Catheter 12/24/22 [place] (Removed)   $ Urethral catheter insertion $ Not inserted for procedure 12/28/22 0930   Catheter Indications Urinary retention (acute or chronic), continuous bladder irrigation or bladder outlet obstruction; Need for fluid volume management of the critically ill patient in a critical care setting 12/30/22 0745   Site Assessment No urethral drainage 12/30/22 0745   Urine Color Yellow 12/30/22 0745   Urine Appearance Clear 12/30/22 0745   Collection Container Standard 12/30/22 0745   Securement Method Securing device (Describe) 12/30/22 0745   Catheter Care Completed Yes 12/29/22 0830   Catheter Best Practices  Drainage tube clipped to bed;Catheter secured to thigh; Tamper seal intact; Bag below bladder;Lack of dependent loop in tubing;Bag not on floor 12/30/22 0745   Status Draining 12/30/22 0745   Output (mL) 350 mL 12/30/22 1032       [REMOVED] Urinary Catheter 01/07/23 2 Way (Removed)   Site Assessment No urethral drainage 01/09/23 0800   Urine Color Yellow 01/09/23 0800   Urine Appearance Clear 01/09/23 0800   Collection Container Standard 01/09/23 0800   Securement Method Leg strap 01/09/23 0800   Catheter Care Completed Yes 01/08/23 2100   Status Patent 01/09/23 0800   Output (mL) 800 mL 01/09/23 0454       Findings: Left post auricular soft tissue neoplasm/skin lesion measuring 1 cm    Detailed Description of Procedure:   Patient was brought to the operating room. Patient was positioned supine on the operating table. SCDs were applied to bilateral lower extremities and powered on. Patient was administered 2 g Ancef for perioperative antibiotic coverage. Anesthesia with [facility] ATHENS was achieved via the anesthesia record. The left postauricular skin lesion was prepped with chlorhexidine and draped in the standard fashion. An elliptical incision was marked surrounding the 1 cm skin lesion/neoplasm. Local anesthesia with 0.25% Marcaine was achieved by local infiltration. A 15 blade was used to make a 4 cm elliptical incision surrounding the lesion down through skin and dermal layers. Electrocautery was then used to dissect down to the underlying subcutaneous tissue. The lesion was then  from the underlying subcutaneous tissue electrocautery. The specimen was then passed off the field and sent for evaluation by pathology. The incision was then irrigated with sterile saline. Bleeding points were then cauterized with electrocautery and hemostasis was achieved. The deep dermal layer was then approximated with 3-0 Vicryl's in a simple interrupted fashion. The skin was then approximated with a 4-0 Vicryl in a running subcuticular fashion. The skin was then cleaned with a wet/dry lap. Dermabond skin glue was then applied over the incision. Patient was awakened from anesthesia. Patient tolerated the procedure well without immediate complication. Patient was taken to PACU in satisfactory condition. Dr. Yaneth Fields was present and scrubbed for the entire the procedure.     Electronically signed by Cayla Gaxiola DO on 1/26/2023 at 4:13 PM

## 2023-01-26 NOTE — DISCHARGE INSTRUCTIONS
SURGERY DISCHARGE INSTRUCTIONS    You may be drowsy or lightheaded after receiving sedation or anesthesia. A responsible person should be with you for the next 24 hours. FOLLOW UP: Call office to schedule follow-up appointment in 2 weeks. DIET: Advance your diet as tolerated. Start with light diet and progress to your normal diet as you feel like eating. If you experience nausea or repeated episodes of vomiting which persist beyond 12-24 hours, notify your doctor. ACTIVITY: Rest today. Increase activity gradually. No driving for 1 day. No driving while on prescription pain medication. No heavy lifting for 4 weeks - nothing over 10 lbs, or heavier than a milk jug. SHOWER/BATHING: Okay to shower in 24 hours after procedure. No tub bathing, swimming or soaking for 2 weeks. WOUND CARE: You have Dermabond dressing (skin glue) applied to your incisions. You do not need to apply anything over them. The glue will gradually work itself off over the next few weeks. Avoid directly applying lotions, creams or oils to your incision. Keep incisions clean and dry. Always ensure you and your care giver clean hands before and after caring for the wound. You may place ice on incisions to decrease the pain and bruising. MEDICATIONS: Take as prescribed. You may take over the counter ibuprofen or tylenol for pain as directed, limit total amount of acetaminophen (tylenol) to 3 grams per 24-hour period. Okay to resume anticoagulant medication after 24hrs. You may experience constipation while taking pain medication, you may take over the counter stool softeners (Docusate/Colace or Senokot-S) as directed. The medication given to reverse general anesthesia and wake you up after surgery (Sugammadex) could interfere with your birth control and cause it to not work as effectively.  Please use alternative method of birth control for at least seven days    SPECIAL INSTRUCTIONS:   Call physician if they or any other problems occur:  Call the office if you have a fever over >101F, or if your incision becomes red, tender, or drains more than a small amount of clear fluid  Fever over 101°    Redness, swelling, hardness or warmth at the operative site  Unrelieved nausea    Foul smelling or cloudy drainage at the operative site   Unrelieved pain    Blood soaked dressing (Some oozing may be normal)    Resume home medications as prescribed except for eliquis and aspirin.  Resume eliquis and aspirin 1-27-23

## 2023-02-01 PROBLEM — M10.9 GOUT: Status: ACTIVE | Noted: 2023-02-01

## 2023-02-02 ENCOUNTER — OFFICE VISIT (OUTPATIENT)
Dept: INTERNAL MEDICINE | Age: 68
End: 2023-02-02
Payer: MEDICARE

## 2023-02-02 ENCOUNTER — TELEPHONE (OUTPATIENT)
Dept: INTERNAL MEDICINE | Age: 68
End: 2023-02-02

## 2023-02-02 ENCOUNTER — HOSPITAL ENCOUNTER (OUTPATIENT)
Dept: OTHER | Age: 68
Setting detail: THERAPIES SERIES
Discharge: HOME | End: 2023-02-02
Payer: MEDICARE

## 2023-02-02 VITALS
SYSTOLIC BLOOD PRESSURE: 130 MMHG | BODY MASS INDEX: 35.08 KG/M2 | RESPIRATION RATE: 18 BRPM | DIASTOLIC BLOOD PRESSURE: 66 MMHG | OXYGEN SATURATION: 96 % | WEIGHT: 224 LBS | HEART RATE: 85 BPM

## 2023-02-02 VITALS
SYSTOLIC BLOOD PRESSURE: 121 MMHG | WEIGHT: 223.1 LBS | TEMPERATURE: 97 F | DIASTOLIC BLOOD PRESSURE: 56 MMHG | HEIGHT: 67 IN | BODY MASS INDEX: 35.02 KG/M2 | OXYGEN SATURATION: 95 % | RESPIRATION RATE: 20 BRPM | HEART RATE: 80 BPM

## 2023-02-02 DIAGNOSIS — E11.43 TYPE 2 DIABETES MELLITUS WITH DIABETIC AUTONOMIC NEUROPATHY, WITHOUT LONG-TERM CURRENT USE OF INSULIN (HCC): Primary | ICD-10-CM

## 2023-02-02 DIAGNOSIS — M1A.9XX0 CHRONIC GOUT WITHOUT TOPHUS, UNSPECIFIED CAUSE, UNSPECIFIED SITE: ICD-10-CM

## 2023-02-02 DIAGNOSIS — E66.9 RESTRICTIVE LUNG DISEASE SECONDARY TO OBESITY: ICD-10-CM

## 2023-02-02 DIAGNOSIS — I50.31 ACUTE HEART FAILURE WITH PRESERVED EJECTION FRACTION (HFPEF) (HCC): ICD-10-CM

## 2023-02-02 DIAGNOSIS — J98.4 RESTRICTIVE LUNG DISEASE SECONDARY TO OBESITY: ICD-10-CM

## 2023-02-02 PROBLEM — I50.9 ACUTE ON CHRONIC CONGESTIVE HEART FAILURE (HCC): Status: RESOLVED | Noted: 2022-07-07 | Resolved: 2023-02-02

## 2023-02-02 LAB
ANION GAP SERPL CALCULATED.3IONS-SCNC: 11 MMOL/L (ref 7–16)
BUN BLDV-MCNC: 17 MG/DL (ref 6–23)
CALCIUM SERPL-MCNC: 9.1 MG/DL (ref 8.6–10.2)
CHLORIDE BLD-SCNC: 99 MMOL/L (ref 98–107)
CO2: 27 MMOL/L (ref 22–29)
CREAT SERPL-MCNC: 1.3 MG/DL (ref 0.7–1.2)
GFR SERPL CREATININE-BSD FRML MDRD: 59 ML/MIN/1.73
GLUCOSE BLD-MCNC: 153 MG/DL (ref 74–99)
POTASSIUM SERPL-SCNC: 3.6 MMOL/L (ref 3.5–5)
PRO-BNP: 2913 PG/ML (ref 0–125)
SODIUM BLD-SCNC: 137 MMOL/L (ref 132–146)

## 2023-02-02 PROCEDURE — 99214 OFFICE O/P EST MOD 30 MIN: CPT

## 2023-02-02 PROCEDURE — 36415 COLL VENOUS BLD VENIPUNCTURE: CPT

## 2023-02-02 PROCEDURE — 83880 ASSAY OF NATRIURETIC PEPTIDE: CPT

## 2023-02-02 PROCEDURE — 99212 OFFICE O/P EST SF 10 MIN: CPT | Performed by: STUDENT IN AN ORGANIZED HEALTH CARE EDUCATION/TRAINING PROGRAM

## 2023-02-02 PROCEDURE — 80048 BASIC METABOLIC PNL TOTAL CA: CPT

## 2023-02-02 RX ORDER — INSULIN GLARGINE 100 [IU]/ML
30 INJECTION, SOLUTION SUBCUTANEOUS DAILY
Qty: 20 ADJUSTABLE DOSE PRE-FILLED PEN SYRINGE | Refills: 0
Start: 2023-02-02

## 2023-02-02 RX ORDER — INSULIN GLARGINE 100 [IU]/ML
30 INJECTION, SOLUTION SUBCUTANEOUS DAILY
Qty: 17 ADJUSTABLE DOSE PRE-FILLED PEN SYRINGE | Refills: 3 | COMMUNITY
Start: 2023-02-02 | End: 2023-02-02

## 2023-02-02 RX ORDER — ALLOPURINOL 200 MG/1
200 TABLET ORAL DAILY
Qty: 90 TABLET | Refills: 1 | Status: SHIPPED | OUTPATIENT
Start: 2023-02-02

## 2023-02-02 ASSESSMENT — ENCOUNTER SYMPTOMS
CONSTIPATION: 0
VOMITING: 0
SORE THROAT: 0
COUGH: 0
ABDOMINAL PAIN: 0
DIARRHEA: 0
SHORTNESS OF BREATH: 0
NAUSEA: 0
ABDOMINAL DISTENTION: 0
WHEEZING: 0

## 2023-02-02 NOTE — PLAN OF CARE
Problem: Chronic Conditions and Co-morbidities  Goal: Patient's chronic conditions and co-morbidity symptoms are monitored and maintained or improved  Flowsheets (Taken 2/2/2023 8797)  Care Plan - Patient's Chronic Conditions and Co-Morbidity Symptoms are Monitored and Maintained or Improved: Monitor and assess patient's chronic conditions and comorbid symptoms for stability, deterioration, or improvement

## 2023-02-02 NOTE — PROGRESS NOTES
Congestive Heart Failure [address]         Sara Yang   1955          Referring Provider: Edgardo Alexis  Primary Care Physician: [address]  Cardiologist: NOT ESTABLISHED YET/ EP DR. Conner Moreau  Nephrologist:  [address] [facility]        History of Present Illness:     Sara Yang is a 69 y.o. male with a history of HFpEF, most recent EF 55-60%. Patient Story:    He does  have SLIGHT  dyspnea with exertion, shortness of breath, or decline in overall functional capacity. He does have orthopnea, PND, nocturnal cough or hemoptysis. He does NOT  have abdominal distention or bloating, early satiety, anorexia/change in appetite. He does has a good urinary response to  oral diuretic. He does have  lower extremity edema. He denies lightheadedness, dizziness. He denies palpitations, syncope or near syncope. He does not complain of chest pain, pressure, discomfort. No Known Allergies        Prior to Visit Medications    Medication Sig Taking? Authorizing Provider   allopurinol 200 MG TABS Take 200 mg by mouth daily  Jackson Mix MD   insulin glargine (BASAGLAR KWIKPEN) 100 UNIT/ML injection pen Inject 30 Units into the skin daily Do not administer if blood sugar is below 180. PAP medication.   Jackson Mix MD   bumetanide (BUMEX) 2 MG tablet Take 2 mg by mouth three times a week Mon-Wed-Fri  Historical Provider, MD   ferrous sulfate (IRON 325) 325 (65 Fe) MG tablet Take 1 tablet by mouth daily (with breakfast)  Chago Meade MD   magnesium oxide (MAG-OX) 400 (240 Mg) MG tablet Take 1 tablet by mouth daily  Chago Meade MD   apixaban (ELIQUIS) 5 MG TABS tablet Take 5 mg by mouth 2 times daily  Historical Provider, MD   metoprolol succinate (TOPROL XL) 25 MG extended release tablet Take 1 tablet by mouth daily  Ke Caal MD   mometasone-formoterol [facility]) 200-5 MCG/ACT inhaler Inhale 2 puffs into the lungs 2 times daily Rinse mouth after using. PAP medication. Elio Lou MD   Insulin Pen Needle (PEN NEEDLES) 31G X 6 MM MISC Once daily. May substitute brand for insurance coverage. Elio Lou MD   Lancets MISC Give Delica lancets. Tests twice a day A.M. And P.M  Elio Lou MD   blood glucose test strips (ONETOUCH VERIO) strip TEST IN THE MORNING AND IN THE EVENING  Elio Lou MD   Cholecalciferol (VITAMIN D) 50 MCG (2000 UT) CAPS capsule Take 2,000 Units by mouth daily  Historical Provider, MD   aspirin (ASPIR-LOW) 81 MG EC tablet TAKE ONE TABLET BY MOUTH EVERY DAY  George Hernandez MD   atorvastatin (LIPITOR) 40 MG tablet TAKE ONE TABLET BY MOUTH DAILY  Mayi Hartman MD   levothyroxine (SYNTHROID) 88 MCG tablet Take 1 tablet by mouth in the morning. Mayi Hartman MD   Insulin Pen Needle (PEN NEEDLES) 32G X 6 MM MISC Take insulin daily  Elio Lou MD   Misc. Devices MISC Please add portability to current O2 order. Resp to evaluate patient for OCD device. Michaele Goltz, MD   Misc. Devices KIT Dispense 1 blood pressure cuff. Jamilah Patel MD   OXYGEN Inhale 2.5 L into the lungs nightly  Patient not taking: No sig reported  Fred Iraheta MD   acetaminophen (TYLENOL) 500 MG tablet Take 1,000 mg by mouth daily as needed for Pain  Historical Provider, MD           Guideline directed medical:  ARNI/ACE I/ARB: No  Beta blocker:   Yes  Aldosterone antagonist:  No  SGLT2i: Yes      Physical Examination:     /66   Pulse 85   Resp 18   Wt 224 lb (101.6 kg)   SpO2 96%   BMI 35.08 kg/m²     Assessment  Charting Type: Shift assessment              HEENT (Head, Ears, Eyes, Nose, & Throat)  HEENT (WDL): Within Defined Limits    Respiratory  Respiratory Pattern: Regular  Respiratory Depth: Normal  Respiratory Quality/Effort: Dyspnea with exertion  Chest Assessment: Chest expansion symmetrical  L Breath Sounds: Fine Crackles, Diminished  R Breath Sounds: Fine Crackles, Diminished    Breath Sounds  Right Lower Lobe: Fine Crackles  Left Lower Lobe: Fine Crackles         Cardiac  Cardiac Rhythm: Ventricular paced    Rhythm Interpretation  Heart Rate: 85         Gastrointestinal  Abdominal (WDL): Exceptions to WDL  Abdomen Inspection: Soft, Rounded  RUQ Bowel Sounds: Active  LUQ Bowel Sounds: Active  RLQ Bowel Sounds: Active  LLQ Bowel Sounds: Active          Bowel Sounds  RUQ Bowel Sounds: Active  LUQ Bowel Sounds: Active  RLQ Bowel Sounds: Active  LLQ Bowel Sounds: Active    Peripheral Vascular  Peripheral Vascular (WDL): Exceptions to WDL  Edema: Left lower extremity, Right lower extremity  RLE Edema: Pitting, +1  LLE Edema: Trace, Non-pitting    Retired, Read Only Skin Color/Condition  Skin Color: Red  Skin Condition/Temp: Warm    Skin Integumentary   Skin Color: Red  Skin Condition/Temp: Warm  Location: RLE                                       Heart Rate: 85                     LAB DATA:    Last 3 BMP      Sodium (mmol/L)   Date Value   01/19/2023 135   01/11/2023 133   01/10/2023 134     Potassium (mmol/L)   Date Value   01/19/2023 4.6   01/11/2023 4.8   01/10/2023 4.9     Potassium reflex Magnesium (mmol/L)   Date Value   01/08/2023 5.1 (H)   01/08/2023 5.6 (H)   12/25/2022 6.5 (H)     Chloride (mmol/L)   Date Value   01/19/2023 97 (L)   01/11/2023 100   01/10/2023 102     CO2 (mmol/L)   Date Value   01/19/2023 29   01/11/2023 21 (L)   01/10/2023 17 (L)     BUN (mg/dL)   Date Value   01/19/2023 17   01/11/2023 44 (H)   01/10/2023 48 (H)     Glucose (mg/dL)   Date Value   01/19/2023 143 (H)   01/11/2023 194 (H)   01/10/2023 250 (H)   04/18/2012 133 (H)   10/25/2011 138 (H)   01/28/2011 162 (H)     Calcium (mg/dL)   Date Value   01/19/2023 9.5   01/11/2023 9.1   01/10/2023 8.9       Last 3 BNP       Pro-BNP (pg/mL)   Date Value   01/19/2023 5,982 (H)   01/10/2023 28,710 (H)   01/08/2023 1,717 (H)          CBC: No results for input(s): WBC, HGB, PLT in the last 72 hours.   BMP:    No results for input(s): NA, K, CL, CO2, BUN, CREATININE, GLUCOSE in the last 72 hours. Hepatic: No results for input(s): AST, ALT, ALB, BILITOT, ALKPHOS in the last 72 hours. Troponin: No results for input(s): TROPONINI in the last 72 hours. BNP: No results for input(s): BNP in the last 72 hours. Lipids: No results for input(s): CHOL, HDL in the last 72 hours. Invalid input(s): LDLCALCU  INR: No results for input(s): INR in the last 72 hours. WEIGHTS:    Wt Readings from Last 3 Encounters:   02/02/23 224 lb (101.6 kg)   02/02/23 223 lb 1.6 oz (101.2 kg)   01/26/23 240 lb (108.9 kg)         TELEMETRY:  Cardiac Regularity: Regular  Cardiac Rhythm/Interpretation: VPACED        ASSESSMENT:  Salo Rocha's weight is down  8LBS FROM LAST VISIT  1/19/2023. PT ADMITS ORAL DIURETIC WAS DECREASED TO THREE TIME S WEEK BY NEPHROLOGY. PT RE INSTRUCTED ON LOW SODIUM DIET. PT ADMITS HAS DECREASED IN AMT OF PICKLES HE HAS BEEN EATING BUT DOES ADMIT TO EATING LUNCH MEATS, STRESSED IMPORTANCE OF LOW SODIUM DIET AND GAVE PT FOOD OPTIONS, PT VERBALIZED UNDERSTANDING. Interventions completed this visit:  IV diuretics given NO  Lab work obtained yes,  BMP/BNP  Reviewed currently prescribed medications with patient, educated on importance of compliance and answered any questions regarding their medication  Educated on signs and symptoms of HF  Educated on low sodium diet    PLAN:  Scheduled to follow up in CHF clinic on   Future Appointments   Date Time Provider Stacie Mtz   2/13/2023  2:00 PM Ash Steele MD [facility]   2/16/2023  9:45 AM [facility] ROOM 1 [facility]   5/15/2023  9:30 AM Norman Barr MD [facility]     Given clinic phone number and aware of signs and symptoms to call with any HF change in symptoms. AVS Sent at home. Asked patients wife to review medications and call with any discrepancies.

## 2023-02-02 NOTE — PROGRESS NOTES
[facility]  Internal Medicine Residency Program  [facility] Note      SUBJECTIVE:  CC: had concerns including 1 Month Follow-Up and Diabetes (Patient states that his blood sugar was 150 this morning).     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  - States has these for past 2 years, have not healed  - Occasional pus and bleeding from these  - Seen GS who removed the lesion near his left ear/scalp, he will follow up with them to discuss pathology results which are not back yet     IDDM2  - Last A1c 7.8 on 1/2023  - Used to be on Basaglar 55u daily, Lipro 20u with meals, Metformin 500 mg BID but stopped on hospital discharge 1/11/2023  - On hospital follow up instructed to continue holding insulin since BG was around 110 at home, but if BG is over 180 to resume lantus at 20u  - Today, states his BG went up to 150 and he took Basaglar 30u along with 20u Lispro premeal, states he has been taking insulin when his BG is above 130  - Discussed stopping the short acting insulin, will continue Basaglar only, but at 30u with holding parameters for when BG is below 180     HFpEF  - Bumex 2 mg MWF  - Taking Toprol-XL 25 mg daily  - No ACEI/ARB due to renal function and constant episodes of RADHA  - NYHA-3  - Follows with CHF clinic     HTN  - On Toprol-XL 25 mg  - BP today 121/56     HLD  - On Atorvastatin 40 mg  - Last Lipid Panel 11/2022     Obesity Hypoventilation Syndrome  - Was on nightly oxygen at 5L, but states stopped due to nasal dryness  - Follows up with Dr. Stacie Khan     Persistent AF  - On Toprol-XL and Eliquis     Sinus Node Dysfunction s/p dual chamber pacemaker since 10/2017  - Follows up with EP  - Denies any symptoms     Hypothyroidism  - On Synthroid 88 mcg  - Last TSH 9.33 on 12/2022, T4 was 1.08    Gout  - On Allopurinol 100 mg daily  - Last uric acid level 9 on 1/2023  - Likely to benefit from Allopurinol dose increasing to 200 mg and rechecking uric acid next visit     HCM  - Due for Covid-19 vaccine  - Will be due for AWV next visit    Review of Systems   Constitutional:  Negative for activity change, appetite change, chills, fatigue, fever and unexpected weight change. HENT:  Negative for postnasal drip and sore throat. Respiratory:  Negative for cough, shortness of breath and wheezing. Cardiovascular:  Positive for leg swelling. Negative for chest pain and palpitations. Gastrointestinal:  Negative for abdominal distention, abdominal pain, constipation, diarrhea, nausea and vomiting. Genitourinary:  Negative for difficulty urinating and dysuria. Musculoskeletal:  Positive for arthralgias and joint swelling. Skin:  Positive for rash. Neurological:  Negative for dizziness, syncope, light-headedness and headaches. Psychiatric/Behavioral:  Negative for behavioral problems. Outpatient Medications Marked as Taking for the 2/2/23 encounter (Office Visit) with Charisse Freedman MD   Medication Sig Dispense Refill    allopurinol 200 MG TABS Take 200 mg by mouth daily 90 tablet 1    insulin glargine (BASAGLAR KWIKPEN) 100 UNIT/ML injection pen Inject 30 Units into the skin daily Do not administer if blood sugar is below 180. PAP medication.  20 Adjustable Dose Pre-filled Pen Syringe 0    bumetanide (BUMEX) 2 MG tablet Take 2 mg by mouth three times a week Mon-Wed-Fri      ferrous sulfate (IRON 325) 325 (65 Fe) MG tablet Take 1 tablet by mouth daily (with breakfast) 30 tablet 3    magnesium oxide (MAG-OX) 400 (240 Mg) MG tablet Take 1 tablet by mouth daily 30 tablet 0    apixaban (ELIQUIS) 5 MG TABS tablet Take 5 mg by mouth 2 times daily      metoprolol succinate (TOPROL XL) 25 MG extended release tablet Take 1 tablet by mouth daily 30 tablet 3    mometasone-formoterol (DULERA) 200-5 MCG/ACT inhaler Inhale 2 puffs into the lungs 2 times daily Rinse mouth after using. PAP medication. 3 each 2    Insulin Pen Needle (PEN NEEDLES) 31G X 6 MM MISC Once daily. May substitute brand for insurance coverage. 100 each 3    Lancets MISC Give Delica lancets. Tests twice a day A.M. And P.M 200 each 1    blood glucose test strips (ONETOUCH VERIO) strip TEST IN THE MORNING AND IN THE EVENING 200 each 1    Cholecalciferol (VITAMIN D) 50 MCG (2000 UT) CAPS capsule Take 2,000 Units by mouth daily      aspirin (ASPIR-LOW) 81 MG EC tablet TAKE ONE TABLET BY MOUTH EVERY DAY 90 tablet 1    atorvastatin (LIPITOR) 40 MG tablet TAKE ONE TABLET BY MOUTH DAILY 90 tablet 1    levothyroxine (SYNTHROID) 88 MCG tablet Take 1 tablet by mouth in the morning. 90 tablet 1    Insulin Pen Needle (PEN NEEDLES) 32G X 6 MM MISC Take insulin daily 100 each 1    Misc. Devices MISC Please add portability to current O2 order. Resp to evaluate patient for OCD device. 1 each 0    Misc. Devices KIT Dispense 1 blood pressure cuff. 1 kit 0    acetaminophen (TYLENOL) 500 MG tablet Take 1,000 mg by mouth daily as needed for Pain         OBJECTIVE:    VS:   BP (!) 121/56 (Site: Left Upper Arm, Position: Sitting, Cuff Size: Large Adult)   Pulse 80   Temp 97 °F (36.1 °C) (Temporal)   Resp 20   Ht 5' 7\" (1.702 m)   Wt 223 lb 1.6 oz (101.2 kg)   SpO2 95%   BMI 34.94 kg/m²     EXAM:  Physical Exam  Vitals reviewed. Constitutional:       General: He is not in acute distress. Appearance: Normal appearance. He is morbidly obese. HENT:      Head: Normocephalic and atraumatic. Eyes:      Extraocular Movements: Extraocular movements intact. Cardiovascular:      Rate and Rhythm: Normal rate and regular rhythm. Heart sounds: Normal heart sounds. Pulmonary:      Effort: Pulmonary effort is normal.      Breath sounds:  No wheezing, rhonchi or rales. Abdominal:      General: Abdomen is protuberant. Bowel sounds are normal.      Palpations: There is no fluid wave. Musculoskeletal:      Cervical back: Neck supple. Right lower leg: 3+ Pitting Edema present. Left lower leg: 3+ Pitting Edema present. Skin:     Capillary Refill: Capillary refill takes less than 2 seconds. Neurological:      General: No focal deficit present. Mental Status: He is alert. Mental status is at baseline. Psychiatric:         Attention and Perception: Attention and perception normal.         Mood and Affect: Mood and affect normal.         Behavior: Behavior normal.         Thought Content: Thought content normal.         Cognition and Memory: Cognition is impaired. Memory is impaired. He exhibits impaired recent memory and impaired remote memory. Judgment: Judgment normal.       ASSESSMENT/PLAN:  I have reviewed all pertinent PMH, PSH, FH, SH, medications and allergies and updated history as appropriate. Lalita Calixto was seen today for 1 month follow-up and diabetes. Diagnoses and all orders for this visit:    Type 2 diabetes mellitus with diabetic autonomic neuropathy, without long-term current use of insulin (HCC)  -     insulin glargine (BASAGLAR KWIKPEN) 100 UNIT/ML injection pen; Inject 30 Units into the skin daily Do not administer if blood sugar is below 180. PAP medication. Chronic gout without tophus, unspecified cause, unspecified site  -     Uric Acid;  Future  -     allopurinol 200 MG TABS; Take 200 mg by mouth daily    Acute heart failure with preserved ejection fraction (HFpEF) (HCC)    Restrictive lung disease secondary to obesity        RTC: 3 months      I have reviewed my findings and recommendations with Devi Rocha and Dr Hallie Antoine MD PGY-3  2/2/2023 10:58 AM

## 2023-02-02 NOTE — PROGRESS NOTES
4 boxes of Basaglar was given to patient from PAP. Verbal instruction given to patient that he will be injecting  30 units daily unless blood glucose is less than 180 units per Dr Posey. Patient verbalized understanding. Write instructions are on every box of injectio pens given to patient. AVS mailed to patient.

## 2023-02-02 NOTE — TELEPHONE ENCOUNTER
Per Dr Kaylene Mena, pt is to discontinue Humalog and Basaglar dose reduced to 30 units daily; email sent to [facility] PAP with above info; made aware pt not receiving PAP Eliquis as his out of pocket expense requirement was not met and approved until November 2022 and will need to meet again in 2023 before being approved

## 2023-02-02 NOTE — PATIENT INSTRUCTIONS
Dear Johana Rocha,    Thank you for coming to your appointment today. I hope we have addressed all of your needs. Please make sure to do the following:  - Continue your medications as listed. - Take you Basaglar at 30u daily, but check your blood sugar before, if blood sugar is below 180, do NOT take the insulin   - We have stopped your short acting insulin, please do not take that any longer   - We have increased Allopurinol to 200 mg daily, we will have you repeat lab work before your next office visit in 3 months  - Get labs drawn before our next follow up. - We will see each other again in 3 months    Call for a sooner appointment if you develop any new or worsening symptoms. Have a great day!     Sincerely,  Melvina Carranza M.D  2/2/2023  10:08 AM

## 2023-02-02 NOTE — PROGRESS NOTES
[facility]  Internal Medicine Residency Clinic    Attending Physician Statement  I have discussed the case, including pertinent history and exam findings with the resident physician. I agree with the assessment, plan and orders as documented by the resident. I have reviewed all pertinent PMHx, PSHx, FamHx, SocialHx, medications, and allergies and updated history as appropriate. Patient presents for routine follow up of medical problems. DM 2 controlled with A1c 7.8%  He resumed basaglar 30 units + premeal since last visit without hypoglycmia reported  Discontinue pre-meal insulin  Jardiance declined by insurance so never started    OHS with nocturnal hypoxemia   Previously on 5L nightly but not using d/t dry nares  recommend humidifier and need to contact O2 suppliet    HFpEF with persistent AF   Continues on bumex 3x per week, follows with CHF clinic   Continues on Eliquis    Hypotension with hx of hypertension   Diastolic 56 today  Remains on only metoprolol and tolerating so far without orthostatic symptoms    Hx gout   Low dose allopurinol, last UA 9; ok to increase to 200 mg daily    Remainder of medical problems as per resident note.     Denia Gonzáles DO  2/2/2023 9:47 AM

## 2023-02-06 ENCOUNTER — TELEPHONE (OUTPATIENT)
Dept: INTERNAL MEDICINE | Age: 68
End: 2023-02-06

## 2023-02-06 RX ORDER — ALLOPURINOL 100 MG/1
200 TABLET ORAL DAILY
Qty: 180 TABLET | Refills: 1 | Status: SHIPPED | OUTPATIENT
Start: 2023-02-06

## 2023-02-06 NOTE — TELEPHONE ENCOUNTER
Prescription changed to 100 mg allopurinol tablets. Please let pharmacy know to discontinue 200 mg tablets.      Hussein Piper MD  2/6/2023

## 2023-02-06 NOTE — TELEPHONE ENCOUNTER
Called and spoke with pharmacist  at [address] [place] [address],[address] and instructed to discontinue 200 mg of Allopurinol

## 2023-02-06 NOTE — TELEPHONE ENCOUNTER
Per [facility] 200mg allopurinol is to expensive for pt and also is not aval. They are asking if it can be changed to 100mg please call to clarify and advise

## 2023-02-08 ENCOUNTER — TELEPHONE (OUTPATIENT)
Dept: CARE COORDINATION | Age: 68
End: 2023-02-08

## 2023-02-08 NOTE — TELEPHONE ENCOUNTER
[facility] was unable to reach [place] before the end of the year. [place] has had multiple hospital visits this year. [facility] attempted to follow up again today and reached the .  was left with [facility] information and a request for a call back. [facility] will close this referral today given the length of time, but will assist [place] as needed should he return [facility] call.

## 2023-02-13 ENCOUNTER — OFFICE VISIT (OUTPATIENT)
Dept: SURGERY | Age: 68
End: 2023-02-13

## 2023-02-13 VITALS
SYSTOLIC BLOOD PRESSURE: 141 MMHG | OXYGEN SATURATION: 93 % | HEIGHT: 67 IN | BODY MASS INDEX: 35.16 KG/M2 | RESPIRATION RATE: 16 BRPM | DIASTOLIC BLOOD PRESSURE: 72 MMHG | WEIGHT: 224 LBS | HEART RATE: 87 BPM

## 2023-02-13 DIAGNOSIS — C44.41 BASAL CELL CARCINOMA (BCC) OF SCALP: Primary | ICD-10-CM

## 2023-02-13 PROCEDURE — 99024 POSTOP FOLLOW-UP VISIT: CPT | Performed by: SURGERY

## 2023-02-13 NOTE — PROGRESS NOTES
Pt here for follow-up from excision of posterior scalp soft tissue neoplasm from 1/26/23. Pt denies pain in area. States he thinks it is healing up fine. Path reports reviewed with pt--basal cell carcinoma--surgical margins negative but it abuts the margins. Upon exam, incision healing well; small scab at uppermost aspect of incision. S/p excision of basal cell carcinoma from posterior scalp    Pt concerned about lesion on lip--will refer to dermatologist for skin survey and to assess for operative need for other lesions.     Dimple Marie MD, FACS  2/13/2023  2:08 PM

## 2023-02-16 ENCOUNTER — HOSPITAL ENCOUNTER (OUTPATIENT)
Dept: OTHER | Age: 68
Setting detail: THERAPIES SERIES
Discharge: HOME | End: 2023-02-16
Payer: MEDICARE

## 2023-02-16 VITALS
SYSTOLIC BLOOD PRESSURE: 125 MMHG | RESPIRATION RATE: 18 BRPM | HEART RATE: 75 BPM | BODY MASS INDEX: 33.83 KG/M2 | OXYGEN SATURATION: 93 % | DIASTOLIC BLOOD PRESSURE: 59 MMHG | WEIGHT: 216 LBS

## 2023-02-16 LAB
ANION GAP SERPL CALCULATED.3IONS-SCNC: 15 MMOL/L (ref 7–16)
BUN BLDV-MCNC: 26 MG/DL (ref 6–23)
CALCIUM SERPL-MCNC: 9.4 MG/DL (ref 8.6–10.2)
CHLORIDE BLD-SCNC: 95 MMOL/L (ref 98–107)
CO2: 25 MMOL/L (ref 22–29)
CREAT SERPL-MCNC: 1.2 MG/DL (ref 0.7–1.2)
GFR SERPL CREATININE-BSD FRML MDRD: >60 ML/MIN/1.73
GLUCOSE BLD-MCNC: 305 MG/DL (ref 74–99)
POTASSIUM SERPL-SCNC: 3.8 MMOL/L (ref 3.5–5)
PRO-BNP: 908 PG/ML (ref 0–125)
SODIUM BLD-SCNC: 135 MMOL/L (ref 132–146)

## 2023-02-16 PROCEDURE — 83880 ASSAY OF NATRIURETIC PEPTIDE: CPT

## 2023-02-16 PROCEDURE — 36415 COLL VENOUS BLD VENIPUNCTURE: CPT

## 2023-02-16 PROCEDURE — 99214 OFFICE O/P EST MOD 30 MIN: CPT

## 2023-02-16 PROCEDURE — 80048 BASIC METABOLIC PNL TOTAL CA: CPT

## 2023-02-16 NOTE — PROGRESS NOTES
Congestive Heart Failure [address]         Ryan Hancock   1955          Referring Provider: Edgardo Alexis  Primary Care Physician: [address]  Cardiologist: NOT ESTABLISHED YET/ EP DR. Andie Parker  Nephrologist:  [facility]        History of Present Illness:     Ryan Hancock is a 69 y.o. male with a history of HFpEF, most recent EF 55-60% on 9-20-21. Patient Story:    He does  have SLIGHT  dyspnea with exertion, shortness of breath, or decline in overall functional capacity. He does not have orthopnea, PND, nocturnal cough or hemoptysis. He does NOT  have abdominal distention or bloating, early satiety, anorexia/change in appetite. He does has a good urinary response to  oral diuretic. He does have slight lower extremity edema. He denies lightheadedness, dizziness. He denies palpitations, syncope or near syncope. He does not complain of chest pain, pressure, discomfort. No Known Allergies        Prior to Visit Medications    Medication Sig Taking? Authorizing Provider   empagliflozin (JARDIANCE) 25 MG tablet Take 25 mg by mouth daily Yes Historical Provider, MD   allopurinol (ZYLOPRIM) 100 MG tablet Take 2 tablets by mouth daily  Yvon Ochoa MD   insulin glargine (BASAGLAR KWIKPEN) 100 UNIT/ML injection pen Inject 30 Units into the skin daily Do not administer if blood sugar is below 180. PAP medication.   Jalen Bradford MD   bumetanide (BUMEX) 2 MG tablet Take 2 mg by mouth three times a week Mon-Wed-Fri  Historical Provider, MD   ferrous sulfate (IRON 325) 325 (65 Fe) MG tablet Take 1 tablet by mouth daily (with breakfast)  Mardene Halsted, MD   magnesium oxide (MAG-OX) 400 (240 Mg) MG tablet Take 1 tablet by mouth daily  Mardene Halsted, MD   apixaban (ELIQUIS) 5 MG TABS tablet Take 5 mg by mouth 2 times daily  Historical Provider, MD   metoprolol succinate (TOPROL XL) 25 MG extended release tablet Take 1 tablet by mouth daily Subhash Byrne MD   mometasone-formoterol [facility]) 200-5 MCG/ACT inhaler Inhale 2 puffs into the lungs 2 times daily Rinse mouth after using. PAP medication. Gilford Rod, MD   Insulin Pen Needle (PEN NEEDLES) 31G X 6 MM MISC Once daily. May substitute brand for insurance coverage. Gilford Rod, MD   Lancets MISC Give Delica lancets. Tests twice a day A.M. And P.M  Gilford Rod, MD   blood glucose test strips (ONETOUCH VERIO) strip TEST IN THE MORNING AND IN THE EVENING  Gilford Rod, MD   Cholecalciferol (VITAMIN D) 50 MCG (2000 UT) CAPS capsule Take 2,000 Units by mouth daily  Historical Provider, MD   aspirin (ASPIR-LOW) 81 MG EC tablet TAKE ONE TABLET BY MOUTH EVERY DAY  lEvia Ardon MD   atorvastatin (LIPITOR) 40 MG tablet TAKE ONE TABLET BY MOUTH DAILY  Subhash Byrne MD   levothyroxine (SYNTHROID) 88 MCG tablet Take 1 tablet by mouth in the morning. Subhash Bynre MD   Insulin Pen Needle (PEN NEEDLES) 32G X 6 MM MISC Take insulin daily  Gilford Rod, MD   Misc. Devices MISC Please add portability to current O2 order. Resp to evaluate patient for OCD device. Tre Villatoro MD   Misc. Devices KIT Dispense 1 blood pressure cuff. Daniela Velez MD   OXYGEN Inhale 2.5 L into the lungs nightly  Patient not taking: No sig reported  Charline Centeno MD   acetaminophen (TYLENOL) 500 MG tablet Take 1,000 mg by mouth daily as needed for Pain  Historical Provider, MD           Guideline directed medical:  ARNI/ACE I/ARB: No  Beta blocker:   Yes  Aldosterone antagonist:  No  SGLT2i: Yes      Physical Examination:     BP (!) 125/59   Pulse 75   Resp 18   Wt 216 lb (98 kg)   SpO2 93%   BMI 33.83 kg/m²     Assessment  Charting Type: Shift assessment              HEENT (Head, Ears, Eyes, Nose, & Throat)  HEENT (WDL): Within Defined Limits    Respiratory  Respiratory Pattern: Regular  Respiratory Depth: Normal  Respiratory Quality/Effort: Dyspnea with exertion  Chest Assessment: Chest expansion symmetrical  L Breath Sounds: Fine Crackles, Diminished  R Breath Sounds: Fine Crackles, Diminished    Breath Sounds  Right Lower Lobe: Fine Crackles  Left Lower Lobe: Fine Crackles         Cardiac  Cardiac Rhythm: Ventricular paced    Rhythm Interpretation  Heart Rate: 75         Gastrointestinal  Abdominal (WDL): Exceptions to WDL  Abdomen Inspection: Soft, Rounded  RUQ Bowel Sounds: Active  LUQ Bowel Sounds: Active  RLQ Bowel Sounds: Active  LLQ Bowel Sounds: Active          Bowel Sounds  RUQ Bowel Sounds: Active  LUQ Bowel Sounds: Active  RLQ Bowel Sounds: Active  LLQ Bowel Sounds: Active    Peripheral Vascular  Peripheral Vascular (WDL): Exceptions to WDL  Edema: Left lower extremity, Right lower extremity  RLE Edema: Pitting, Trace  LLE Edema: Trace, Pitting                                                 Heart Rate: 75                     LAB DATA:    Last 3 BMP      Sodium (mmol/L)   Date Value   02/02/2023 137   01/19/2023 135   01/11/2023 133     Potassium (mmol/L)   Date Value   02/02/2023 3.6   01/19/2023 4.6   01/11/2023 4.8     Potassium reflex Magnesium (mmol/L)   Date Value   01/08/2023 5.1 (H)   01/08/2023 5.6 (H)   12/25/2022 6.5 (H)     Chloride (mmol/L)   Date Value   02/02/2023 99   01/19/2023 97 (L)   01/11/2023 100     CO2 (mmol/L)   Date Value   02/02/2023 27   01/19/2023 29   01/11/2023 21 (L)     BUN (mg/dL)   Date Value   02/02/2023 17   01/19/2023 17   01/11/2023 44 (H)     Glucose (mg/dL)   Date Value   02/02/2023 153 (H)   01/19/2023 143 (H)   01/11/2023 194 (H)   04/18/2012 133 (H)   10/25/2011 138 (H)   01/28/2011 162 (H)     Calcium (mg/dL)   Date Value   02/02/2023 9.1   01/19/2023 9.5   01/11/2023 9.1       Last 3 BNP       Pro-BNP (pg/mL)   Date Value   02/02/2023 2,913 (H)   01/19/2023 5,982 (H)   01/10/2023 28,710 (H)          CBC: No results for input(s): WBC, HGB, PLT in the last 72 hours.   BMP:    No results for input(s): NA, K, CL, CO2, BUN, CREATININE, GLUCOSE in the last 72 hours. Hepatic: No results for input(s): AST, ALT, ALB, BILITOT, ALKPHOS in the last 72 hours. Troponin: No results for input(s): TROPONINI in the last 72 hours. BNP: No results for input(s): BNP in the last 72 hours. Lipids: No results for input(s): CHOL, HDL in the last 72 hours. Invalid input(s): LDLCALCU  INR: No results for input(s): INR in the last 72 hours. WEIGHTS:    Wt Readings from Last 3 Encounters:   02/16/23 216 lb (98 kg)   02/13/23 224 lb (101.6 kg)   02/02/23 224 lb (101.6 kg)         TELEMETRY:  Cardiac Regularity: Regular  Cardiac Rhythm/Interpretation: VPACED        ASSESSMENT:  Jodi Rocha's weight is down  8 lbs from last visit. Has no c/o   BLE edema is better from last visit 2 weeks ago. Interventions completed this visit:  IV diuretics given NO  Lab work obtained yes,  BMP/BNP  Reviewed currently prescribed medications with patient, educated on importance of compliance and answered any questions regarding their medication  Educated on signs and symptoms of HF  Educated on low sodium diet    PLAN:  Scheduled to follow up in CHF clinic on   Future Appointments   Date Time Provider Stacie Mtz   3/9/2023  9:30 AM [facility] CHF ROOM 1 [facility]   5/15/2023  9:30 AM Corinne Sweet MD [facility]     Given clinic phone number and aware of signs and symptoms to call with any HF change in symptoms. AVS Sent at home. Asked patients wife to review medications and call with any discrepancies.

## 2023-02-16 NOTE — PLAN OF CARE
Problem: Chronic Conditions and Co-morbidities  Goal: Patient's chronic conditions and co-morbidity symptoms are monitored and maintained or improved  Flowsheets (Taken 2/16/2023 2727)  Care Plan - Patient's Chronic Conditions and Co-Morbidity Symptoms are Monitored and Maintained or Improved: Monitor and assess patient's chronic conditions and comorbid symptoms for stability, deterioration, or improvement

## 2023-02-17 ENCOUNTER — TELEPHONE (OUTPATIENT)
Dept: INTERNAL MEDICINE | Age: 68
End: 2023-02-17

## 2023-02-17 ENCOUNTER — TELEPHONE (OUTPATIENT)
Dept: OTHER | Age: 68
End: 2023-02-17

## 2023-02-17 DIAGNOSIS — E03.9 ACQUIRED HYPOTHYROIDISM: ICD-10-CM

## 2023-02-17 PROBLEM — E87.5 HYPERKALEMIA: Status: RESOLVED | Noted: 2022-12-24 | Resolved: 2023-02-17

## 2023-02-17 PROBLEM — I49.5 SINUS NODE DYSFUNCTION (HCC): Status: RESOLVED | Noted: 2017-10-03 | Resolved: 2023-02-17

## 2023-02-17 PROBLEM — R57.1 HYPOVOLEMIC SHOCK (HCC): Status: RESOLVED | Noted: 2023-01-07 | Resolved: 2023-02-17

## 2023-02-17 PROBLEM — N17.9 AKI (ACUTE KIDNEY INJURY) (HCC): Status: RESOLVED | Noted: 2022-03-10 | Resolved: 2023-02-17

## 2023-02-17 PROBLEM — J96.12 CHRONIC HYPERCAPNIC RESPIRATORY FAILURE (HCC): Status: RESOLVED | Noted: 2021-09-21 | Resolved: 2023-02-17

## 2023-02-17 PROBLEM — E86.1 HYPOVOLEMIA: Status: RESOLVED | Noted: 2023-01-07 | Resolved: 2023-02-17

## 2023-02-17 PROBLEM — I48.4 ATYPICAL ATRIAL FLUTTER (HCC): Status: RESOLVED | Noted: 2017-09-07 | Resolved: 2023-02-17

## 2023-02-17 PROBLEM — G93.41 ACUTE METABOLIC ENCEPHALOPATHY: Status: RESOLVED | Noted: 2023-01-09 | Resolved: 2023-02-17

## 2023-02-17 RX ORDER — LEVOTHYROXINE SODIUM 88 UG/1
88 TABLET ORAL DAILY
Qty: 90 TABLET | Refills: 2 | Status: SHIPPED | OUTPATIENT
Start: 2023-02-17 | End: 2023-08-16

## 2023-02-17 NOTE — TELEPHONE ENCOUNTER
CHF clinic called on patient and they were reviewing medications and patient stated he has not had Synthroid. She stated medication is not in current medication list.  Last TSH was in December 2022. Is patient to be taking this medication, if so will need prescription sent to pharmacy. Unable to pend medication.     Last Appointment:  2/2/2023  Future Appointments   Date Time Provider Stacie Mtz   3/9/2023  9:30 AM [facility] CHF ROOM 1 [facility]   5/15/2023  9:30 AM [facility]

## 2023-02-22 NOTE — PROGRESS NOTES
[facility]  Internal Medicine Residency Program  Progress Note - House Team     Patient:  Gale Buchanan 69 y.o. male MRN: [id number]     Date of Service: 8/23/2022     CC: Shortness of breath for 2 days    Days since admission: 1    Subjective     Overnight events: Patient complained of low back pain, has chronic low back pain and takes Tylenol 3 at home. PDMP reviewed. Norco substituted for Tylenol 3. Patient was sitting up resting in bed on examination. He states he is feeling much improved today, well enough to go home. He states that his pain is significantly improved. Denies shortness of breath, chest pain, abdominal pain, nausea, constipation, diarrhea, and trouble urinating. He has no questions this morning, save when he might be discharged. Explained to patient that there are a few tests, including an ECHO, which we would like to complete before discharge. Patient amenable. Objective     Physical Exam:  Vitals: BP (!) 102/45   Pulse 61   Temp 98.4 °F (36.9 °C) (Tympanic)   Resp 16   Ht 5' 7\" (1.702 m)   Wt 246 lb (111.6 kg)   SpO2 100%   BMI 38.53 kg/m²     I & O - 24hr:   Intake/Output Summary (Last 24 hours) at 8/23/2022 1222  Last data filed at 8/23/2022 1144  Gross per 24 hour   Intake 300 ml   Output 2850 ml   Net -2550 ml      General Appearance: alert, appears stated age, and cooperative  HEENT:  Head: Normocephalic, no lesions, without obvious abnormality. Neck: supple, symmetrical, trachea midline  Lung: clear to auscultation bilaterally  Heart: irregularly irregular rhythm  Abdomen: soft, non-tender; bowel sounds normal; no masses,  no organomegaly  Extremities:  edema 2+ BLE to knees  Musculokeletal: No joint swelling, no muscle tenderness. ROM normal in all joints of extremities.    Neurologic: Mental status: Alert, oriented, thought content appropriate  Subject  Pertinent Information & Imaging Studies, Consults     CBC:   Lab Results   Component Value Date/Time    WBC 7.7 08/23/2022 05:07 AM    RBC 3.41 08/23/2022 05:07 AM    HGB 10.8 08/23/2022 05:07 AM    HCT 33.4 08/23/2022 05:07 AM    MCV 97.9 08/23/2022 05:07 AM    MCH 31.7 08/23/2022 05:07 AM    MCHC 32.3 08/23/2022 05:07 AM    RDW 17.1 08/23/2022 05:07 AM     08/23/2022 05:07 AM    MPV 9.4 08/23/2022 05:07 AM     CMP:    Lab Results   Component Value Date/Time     08/23/2022 05:07 AM    K 4.3 08/23/2022 05:07 AM     08/23/2022 05:07 AM    CO2 23 08/23/2022 05:07 AM    BUN 28 08/23/2022 05:07 AM    CREATININE 1.2 08/23/2022 05:07 AM    GFRAA >60 08/23/2022 05:07 AM    LABGLOM >60 08/23/2022 05:07 AM    GLUCOSE 114 08/23/2022 05:07 AM    GLUCOSE 133 04/18/2012 08:43 AM    PROT 7.6 08/22/2022 12:38 PM    LABALBU 4.0 08/22/2022 12:38 PM    LABALBU 4.4 10/25/2011 09:50 AM    CALCIUM 9.2 08/23/2022 05:07 AM    BILITOT 0.5 08/22/2022 12:38 PM    ALKPHOS 96 08/22/2022 12:38 PM    AST 16 08/22/2022 12:38 PM    ALT 25 08/22/2022 12:38 PM       IMAGING:   Imaging Studies:    XR CHEST (2 VW)    Result Date: 8/22/2022  Cardiomegaly with mild interstitial pulmonary edema which is slightly more pronounced than on the previous exam.           Notable Cultures:      Blood cultures   Blood Culture, Routine   Date Value Ref Range Status   09/17/2021 5 Days no growth  Final     Respiratory cultures No results found for: RESPCULTURE   Gram Stain Result   Date Value Ref Range Status   05/29/2017   Final    Gram stain performed on unspun fluidPolymorphonuclear leukocytes not seenEpithelial cells not seenRare Erythrocytes     Urine   Urine Culture, Routine   Date Value Ref Range Status   05/25/2017 Growth not present  Final     Legionella No results found for: LABLEGI  C Diff PCR No results found for: CDIFPCR  Wound culture/abscess: No results for input(s): WNDABS in the last 72 hours. Tip culture:No results for input(s): CXCATHTIP in the last 72 hours.      Antibiotic  Days  Day started OXYGENATION: 2 L NC          Resident's Assessment and Plan     Estrellita Teran is a 69 y.o. male with  has a past medical history of RADHA (acute kidney injury) ([address] Atrial fibrillation ([address] Carpal tunnel syndrome, Encounter regarding vascular access for dialysis for ESRD ([address] Hyperlipidemia, Hypertension, Hypothyroidism, Lung nodule, Nocturnal hypoxemia due to obesity, Obesity, NIKO (obstructive sleep apnea), Osteoarthritis, Pinched nerve, Restrictive lung disease secondary to obesity, Sinus node dysfunction ([address] and Type II or unspecified type diabetes mellitus without mention of complication, not stated as uncontrolled. came here with CC   Chief Complaint   Patient presents with    Shortness of Breath     Pt states \" I cant breathe\". Pt reports symptoms for couple days. Pt was at clinic and sent over.  Pt family states \" he is full of water\"          Days since admission: 1    Consults:   Heart Failure Nurse/Coordinator    Assessment/Plan:  Cardio   CHF exacerbation, HFpEF, EF 55% (9/2021)  Increase in weight, +21 lbs since 7/14/22  Increased dyspnea on exertion  CXR showed cardiomegaly with pulmonary edema on admission worse than prior exams   Follow repeat ECHO  Continue Bumex 2 mg daily  Hold aldactone and metolazone in setting of borderline-low BP  Follow BMP   Cardiology consult  Hx of persistent atrial fibrillation   Continue Eliquis  Dual chamber [employer], MRI conditional placed 10/3/2017 for sinus node dysfunction  Device recently interrogated 8/6/22 showing AF burden of 93.8%, normal device function  Patient follows with Dr. Vicenta Thompson currently held in setting of HR 60s/concern for bradycardia, patient has opted for rate control only for his a fib  Restart metoprolol at lower dose, currently taking 25 mg   Sinus node dysfunction s/p pacemaker placement (10/3/2017)  Pacemaker placement as above  Device recently interrogated 8/6/22 showing AF burden of 93.8%, normal device Azelaic Acid Counseling: Patient counseled that medicine may cause skin irritation and to avoid applying near the eyes.  In the event of skin irritation, the patient was advised to reduce the amount of the drug applied or use it less frequently.   The patient verbalized understanding of the proper use and possible adverse effects of azelaic acid.  All of the patient's questions and concerns were addressed.

## 2023-03-02 ENCOUNTER — SOCIAL WORK (OUTPATIENT)
Dept: INTERNAL MEDICINE | Age: 68
End: 2023-03-02

## 2023-03-02 NOTE — PROGRESS NOTES
Pt to LSW office with empty bottle of Eliquis; pt thought he obtained thru PAP and advised not yet eligible due to not meeting out of pocket expense requirement by drug company ; contact with pharmacy and there is refill available and copay cost is $42 which pt states can afford and wife will  w him at pharmacy

## 2023-03-09 ENCOUNTER — TELEPHONE (OUTPATIENT)
Dept: INTERNAL MEDICINE | Age: 68
End: 2023-03-09

## 2023-03-09 ENCOUNTER — HOSPITAL ENCOUNTER (OUTPATIENT)
Dept: OTHER | Age: 68
Setting detail: THERAPIES SERIES
Discharge: HOME | End: 2023-03-09
Payer: MEDICARE

## 2023-03-09 VITALS
HEART RATE: 76 BPM | BODY MASS INDEX: 33.05 KG/M2 | OXYGEN SATURATION: 97 % | DIASTOLIC BLOOD PRESSURE: 69 MMHG | WEIGHT: 211 LBS | RESPIRATION RATE: 18 BRPM | SYSTOLIC BLOOD PRESSURE: 141 MMHG

## 2023-03-09 DIAGNOSIS — E11.43 TYPE 2 DIABETES MELLITUS WITH DIABETIC AUTONOMIC NEUROPATHY, WITHOUT LONG-TERM CURRENT USE OF INSULIN (HCC): ICD-10-CM

## 2023-03-09 LAB
ANION GAP SERPL CALCULATED.3IONS-SCNC: 12 MMOL/L (ref 7–16)
BUN BLDV-MCNC: 48 MG/DL (ref 6–23)
CALCIUM SERPL-MCNC: 8.8 MG/DL (ref 8.6–10.2)
CHLORIDE BLD-SCNC: 94 MMOL/L (ref 98–107)
CO2: 24 MMOL/L (ref 22–29)
CREAT SERPL-MCNC: 1.1 MG/DL (ref 0.7–1.2)
GFR SERPL CREATININE-BSD FRML MDRD: >60 ML/MIN/1.73
GLUCOSE BLD-MCNC: 424 MG/DL (ref 74–99)
POTASSIUM SERPL-SCNC: 3.9 MMOL/L (ref 3.5–5)
PRO-BNP: 697 PG/ML (ref 0–125)
SODIUM BLD-SCNC: 130 MMOL/L (ref 132–146)

## 2023-03-09 PROCEDURE — 99214 OFFICE O/P EST MOD 30 MIN: CPT

## 2023-03-09 PROCEDURE — 36415 COLL VENOUS BLD VENIPUNCTURE: CPT

## 2023-03-09 PROCEDURE — 83880 ASSAY OF NATRIURETIC PEPTIDE: CPT

## 2023-03-09 PROCEDURE — 80048 BASIC METABOLIC PNL TOTAL CA: CPT

## 2023-03-09 RX ORDER — INSULIN GLARGINE 100 [IU]/ML
35 INJECTION, SOLUTION SUBCUTANEOUS DAILY
Qty: 20 ADJUSTABLE DOSE PRE-FILLED PEN SYRINGE | Refills: 0
Start: 2023-03-09

## 2023-03-09 NOTE — TELEPHONE ENCOUNTER
Spoke to pt's wife, Pedro Amezcua, discussed BG was 424 on BMP today. Pt currently only taking basaglar 30U daily, she states his BG at home varies between high 200s and 300s. We will increase Basaglar dose to 35U daily. She will continue to follow his BG closely and will give us a call if his BG is still above 400 by tomorrow night.

## 2023-03-09 NOTE — PLAN OF CARE
Problem: Chronic Conditions and Co-morbidities  Goal: Patient's chronic conditions and co-morbidity symptoms are monitored and maintained or improved  Flowsheets (Taken 3/9/2023 0596)  Care Plan - Patient's Chronic Conditions and Co-Morbidity Symptoms are Monitored and Maintained or Improved: Monitor and assess patient's chronic conditions and comorbid symptoms for stability, deterioration, or improvement

## 2023-03-09 NOTE — TELEPHONE ENCOUNTER
Driss Hodges @ [phone] called and patient had las drawn today and his glucose was 424. She wants to make sure PCP sees the labs. Driss Hodges also states she is going to call the patient's wife.

## 2023-03-09 NOTE — PROGRESS NOTES
Congestive Heart Failure [address]         Lourdes Valenzuela   1955          Referring Provider: Edgardo Alexis  Primary Care Physician: [address]  Cardiologist: NOT ESTABLISHED YET/ EP DR. Ines Vickers  Nephrologist:  [address]        History of Present Illness:     Lourdes Valenzuela is a 69 y.o. male with a history of HFpEF, most recent EF 55-60% on 9-20-21. Patient Story:    He does  have SLIGHT  dyspnea with exertion, shortness of breath, or decline in overall functional capacity. He does not have orthopnea, PND, nocturnal cough or hemoptysis. He does NOT  have abdominal distention or bloating, early satiety, anorexia/change in appetite. He does has a good urinary response to  oral diuretic. He does have slight lower extremity edema. He denies lightheadedness, dizziness. He denies palpitations, syncope or near syncope. He does not complain of chest pain, pressure, discomfort. No Known Allergies        Prior to Visit Medications    Medication Sig Taking? Authorizing Provider   Potassium 99 MG TABS Take by mouth daily  Historical Provider, MD   levothyroxine (SYNTHROID) 88 MCG tablet Take 1 tablet by mouth Daily  Ilene Bennett., DO   empagliflozin (JARDIANCE) 25 MG tablet Take 25 mg by mouth daily  Historical Provider, MD   allopurinol (ZYLOPRIM) 100 MG tablet Take 2 tablets by mouth daily  Michaela Fernandez MD   insulin glargine (BASAGLAR KWIKPEN) 100 UNIT/ML injection pen Inject 30 Units into the skin daily Do not administer if blood sugar is below 180. PAP medication.   Lucia Jones MD   bumetanide (BUMEX) 2 MG tablet Take 2 mg by mouth three times a week Mon-Wed-Fri  Historical Provider, MD   ferrous sulfate (IRON 325) 325 (65 Fe) MG tablet Take 1 tablet by mouth daily (with breakfast)  Edwina Knott MD   magnesium oxide (MAG-OX) 400 (240 Mg) MG tablet Take 1 tablet by mouth daily  Edwina Knott MD   apixaban (ELIQUIS) 5 MG TABS tablet Take 5 mg by mouth 2 times daily  Historical Provider, MD   metoprolol succinate (TOPROL XL) 25 MG extended release tablet Take 1 tablet by mouth daily  Marcelle Damian MD   mometasone-formoterol (DULERA) 200-5 MCG/ACT inhaler Inhale 2 puffs into the lungs 2 times daily Rinse mouth after using. PAP medication.  Girish Posey MD   Insulin Pen Needle (PEN NEEDLES) 31G X 6 MM MISC Once daily. May substitute brand for insurance coverage.  Girish Posey MD   Lancets MISC Give Delica lancets. Tests twice a day A.M. And P.M  Girish Posey MD   blood glucose test strips (ONETOUCH VERIO) strip TEST IN THE MORNING AND IN THE EVENING  Girish Posey MD   Cholecalciferol (VITAMIN D) 50 MCG (2000 UT) CAPS capsule Take 2,000 Units by mouth daily  Historical Provider, MD   aspirin (ASPIR-LOW) 81 MG EC tablet TAKE ONE TABLET BY MOUTH EVERY DAY  Etelvina Navas MD   atorvastatin (LIPITOR) 40 MG tablet TAKE ONE TABLET BY MOUTH DAILY  Marcelle Damian MD   Insulin Pen Needle (PEN NEEDLES) 32G X 6 MM MISC Take insulin daily  Girish Posey MD   Misc. Devices MISC Please add portability to current O2 order. Resp to evaluate patient for OCD device.  Martell Rosenberg MD   Misc. Devices KIT Dispense 1 blood pressure cuff.  Yokasta Kearney MD   OXYGEN Inhale 2.5 L into the lungs nightly  Patient not taking: No sig reported  Juan Burch MD   acetaminophen (TYLENOL) 500 MG tablet Take 1,000 mg by mouth daily as needed for Pain  Historical Provider, MD           Guideline directed medical:  ARNI/ACE I/ARB: No  Beta blocker:  Yes  Aldosterone antagonist:  No  SGLT2i: Yes ?       Physical Examination:     BP (!) 141/69   Pulse 76   Resp 18   Wt 211 lb (95.7 kg)   SpO2 97%   BMI 33.05 kg/m²     Assessment  Charting Type: Shift assessment              HEENT (Head, Ears, Eyes, Nose, & Throat)  HEENT (WDL): Within Defined Limits    Respiratory  Respiratory Pattern: Regular  Respiratory Depth: Normal  Respiratory  Quality/Effort: Dyspnea with exertion  Chest Assessment: Chest expansion symmetrical  L Breath Sounds: Diminished  R Breath Sounds: Diminished    Breath Sounds  Right Lower Lobe: Diminished  Left Lower Lobe: Diminished         Cardiac  Cardiac Rhythm: Ventricular paced    Rhythm Interpretation  Heart Rate: 76         Gastrointestinal  Abdominal (WDL): Exceptions to WDL  Abdomen Inspection: Soft, Rounded  RUQ Bowel Sounds: Active  LUQ Bowel Sounds: Active  RLQ Bowel Sounds: Active  LLQ Bowel Sounds: Active          Bowel Sounds  RUQ Bowel Sounds: Active  LUQ Bowel Sounds: Active  RLQ Bowel Sounds: Active  LLQ Bowel Sounds: Active    Peripheral Vascular  Peripheral Vascular (WDL): Exceptions to WDL  Edema: Left lower extremity, Right lower extremity  RLE Edema: Trace  LLE Edema: Trace                                                 Heart Rate: 76                     LAB DATA:    Last 3 BMP      Sodium (mmol/L)   Date Value   02/16/2023 135   02/02/2023 137   01/19/2023 135     Potassium (mmol/L)   Date Value   02/16/2023 3.8   02/02/2023 3.6   01/19/2023 4.6     Potassium reflex Magnesium (mmol/L)   Date Value   01/08/2023 5.1 (H)   01/08/2023 5.6 (H)   12/25/2022 6.5 (H)     Chloride (mmol/L)   Date Value   02/16/2023 95 (L)   02/02/2023 99   01/19/2023 97 (L)     CO2 (mmol/L)   Date Value   02/16/2023 25   02/02/2023 27   01/19/2023 29     BUN (mg/dL)   Date Value   02/16/2023 26 (H)   02/02/2023 17   01/19/2023 17     Glucose (mg/dL)   Date Value   02/16/2023 305 (H)   02/02/2023 153 (H)   01/19/2023 143 (H)   04/18/2012 133 (H)   10/25/2011 138 (H)   01/28/2011 162 (H)     Calcium (mg/dL)   Date Value   02/16/2023 9.4   02/02/2023 9.1   01/19/2023 9.5       Last 3 BNP       Pro-BNP (pg/mL)   Date Value   02/16/2023 908 (H)   02/02/2023 2,913 (H)   01/19/2023 5,982 (H)          CBC: No results for input(s): WBC, HGB, PLT in the last 72 hours.   BMP:    No results for input(s): NA, K, CL, CO2, BUN, CREATININE, GLUCOSE in the last 72 hours. Hepatic: No results for input(s): AST, ALT, ALB, BILITOT, ALKPHOS in the last 72 hours. Troponin: No results for input(s): TROPONINI in the last 72 hours. BNP: No results for input(s): BNP in the last 72 hours. Lipids: No results for input(s): CHOL, HDL in the last 72 hours. Invalid input(s): LDLCALCU  INR: No results for input(s): INR in the last 72 hours. WEIGHTS:    Wt Readings from Last 3 Encounters:   03/09/23 211 lb (95.7 kg)   02/16/23 216 lb (98 kg)   02/13/23 224 lb (101.6 kg)         TELEMETRY:  Cardiac Regularity: Regular  Cardiac Rhythm/Interpretation: VPACED        ASSESSMENT:  Jalen Rocha's weight is down  5lbs from last visit. Has no c/o   BLE edema is better from last visit States his breathing is better as well. Interventions completed this visit:  IV diuretics given NO  Lab work obtained yes,  BMP/BNP  Reviewed currently prescribed medications with patient, educated on importance of compliance and answered any questions regarding their medication  Educated on signs and symptoms of HF  Educated on low sodium diet    PLAN:  Scheduled to follow up in CHF clinic on   Future Appointments   Date Time Provider Stacie Mtz   5/15/2023  9:30 AM MD [facility] [address]     Given clinic phone number and aware of signs and symptoms to call with any HF change in symptoms. AVS Sent at home. Asked patients wife to review medications and call with any discrepancies.

## 2023-04-27 ENCOUNTER — HOSPITAL ENCOUNTER (OUTPATIENT)
Dept: OTHER | Age: 68
Setting detail: THERAPIES SERIES
Discharge: HOME | End: 2023-04-27
Payer: MEDICARE

## 2023-04-27 VITALS
RESPIRATION RATE: 18 BRPM | SYSTOLIC BLOOD PRESSURE: 128 MMHG | WEIGHT: 226 LBS | BODY MASS INDEX: 35.4 KG/M2 | HEART RATE: 62 BPM | OXYGEN SATURATION: 95 % | DIASTOLIC BLOOD PRESSURE: 62 MMHG

## 2023-04-27 LAB
ANION GAP SERPL CALCULATED.3IONS-SCNC: 10 MMOL/L (ref 7–16)
BNP BLD-MCNC: 725 PG/ML (ref 0–125)
BUN SERPL-MCNC: 22 MG/DL (ref 6–23)
CALCIUM SERPL-MCNC: 8.9 MG/DL (ref 8.6–10.2)
CHLORIDE SERPL-SCNC: 98 MMOL/L (ref 98–107)
CO2 SERPL-SCNC: 25 MMOL/L (ref 22–29)
CREAT SERPL-MCNC: 1.1 MG/DL (ref 0.7–1.2)
GLUCOSE SERPL-MCNC: 379 MG/DL (ref 74–99)
POTASSIUM SERPL-SCNC: 3.9 MMOL/L (ref 3.5–5)
SODIUM SERPL-SCNC: 133 MMOL/L (ref 132–146)

## 2023-04-27 PROCEDURE — 36415 COLL VENOUS BLD VENIPUNCTURE: CPT

## 2023-04-27 PROCEDURE — 2500000003 HC RX 250 WO HCPCS: Performed by: INTERNAL MEDICINE

## 2023-04-27 PROCEDURE — 99214 OFFICE O/P EST MOD 30 MIN: CPT

## 2023-04-27 PROCEDURE — 80048 BASIC METABOLIC PNL TOTAL CA: CPT

## 2023-04-27 PROCEDURE — 83880 ASSAY OF NATRIURETIC PEPTIDE: CPT

## 2023-04-27 PROCEDURE — 96376 TX/PRO/DX INJ SAME DRUG ADON: CPT

## 2023-04-27 PROCEDURE — 2580000003 HC RX 258: Performed by: INTERNAL MEDICINE

## 2023-04-27 RX ORDER — BUMETANIDE 0.25 MG/ML
2 INJECTION INTRAMUSCULAR; INTRAVENOUS ONCE
Status: COMPLETED | OUTPATIENT
Start: 2023-04-27 | End: 2023-04-27

## 2023-04-27 RX ORDER — SODIUM CHLORIDE 0.9 % (FLUSH) 0.9 %
10 SYRINGE (ML) INJECTION PRN
Status: DISCONTINUED | OUTPATIENT
Start: 2023-04-27 | End: 2023-04-28 | Stop reason: HOSPADM

## 2023-04-27 RX ADMIN — SODIUM CHLORIDE, PRESERVATIVE FREE 10 ML: 5 INJECTION INTRAVENOUS at 14:20

## 2023-04-27 RX ADMIN — BUMETANIDE 2 MG: 0.25 INJECTION INTRAMUSCULAR; INTRAVENOUS at 10:49

## 2023-04-27 NOTE — PROGRESS NOTES
Congestive Heart Failure [address]         Selma Martinez   1955        Referring Provider: Edgardo Alexis  Primary Care Physician: [address]  Cardiologist: NOT ESTABLISHED YET/ EP DR. Shannan Wagner  Nephrologist:  [facility]      History of Present Illness:     Selma Martinez is a 69 y.o. male with a history of HFpEF, most recent EF 55-60% on 9-20-21. Patient Story:    He does have dyspnea with exertion with distance, shortness of breath, or decline in overall functional capacity. He does not have orthopnea, but sleeps in a recliner. PND, nocturnal cough or hemoptysis His  abdomin is large, rounded, no c/o's distention or bloating, early satiety, anorexia/change in appetite. He does has a good urinary response to  oral diuretic. He does have  lower extremity edema. He denies lightheadedness, dizziness. He denies palpitations, syncope or near syncope. He does not complain of chest pain, pressure, discomfort. No Known Allergies        Prior to Visit Medications    Medication Sig Taking? Authorizing Provider   insulin glargine (BASAGLAR KWIKPEN) 100 UNIT/ML injection pen Inject 35 Units into the skin daily Do not administer if blood sugar is below 180. PAP medication.   Feliciano Leos MD   Potassium 99 MG TABS Take by mouth daily  Historical Provider, MD   levothyroxine (SYNTHROID) 88 MCG tablet Take 1 tablet by mouth Daily  [facility] DO   empagliflozin (JARDIANCE) 25 MG tablet Take 25 mg by mouth daily  Historical Provider, MD   allopurinol (ZYLOPRIM) 100 MG tablet Take 2 tablets by mouth daily  Jocelyne Reed MD   bumetanide (BUMEX) 2 MG tablet Take 2 mg by mouth three times a week Mon-Wed-Fri  Historical Provider, MD   ferrous sulfate (IRON 325) 325 (65 Fe) MG tablet Take 1 tablet by mouth daily (with breakfast)  Kory Griggs MD   magnesium oxide (MAG-OX) 400 (240 Mg) MG tablet Take 1 tablet by mouth daily  Kory Griggs MD

## 2023-04-27 NOTE — PROGRESS NOTES
Congestive Heart Failure [address]         Makayla Tran   1955        Referring Provider: Edgardo Alexis  Primary Care Physician: [address]  Cardiologist: NOT ESTABLISHED YET/ EP DR. Marylene Cheadle  Nephrologist:  [facility]      History of Present Illness:     Makayla Tran is a 69 y.o. male with a history of HFpEF, most recent EF 55-60% on 9-20-21. Patient Story:    He does  have dyspnea with exertion, shortness of breath, or decline in overall functional capacity. He does not have orthopnea, but he sleeps in a recliner, PND, nocturnal cough or hemoptysis. He does not abdominal distention or bloating, early satiety, anorexia/change in appetite, but abdomen large and rounded. He does have a good urinary response to oral diuretic. He does have lower extremity edema. He denies lightheadedness, dizziness. He denies palpitations, syncope or near syncope. He does not complain of chest pain, pressure, discomfort. No Known Allergies        Prior to Visit Medications    Medication Sig Taking? Authorizing Provider   insulin glargine (BASAGLAR KWIKPEN) 100 UNIT/ML injection pen Inject 35 Units into the skin daily Do not administer if blood sugar is below 180. PAP medication.   Elio Lou MD   Potassium 99 MG TABS Take by mouth daily  Historical Provider, MD   levothyroxine (SYNTHROID) 88 MCG tablet Take 1 tablet by mouth Daily  Monika Alert., DO   empagliflozin (JARDIANCE) 25 MG tablet Take 1 tablet by mouth daily  Historical Provider, MD   allopurinol (ZYLOPRIM) 100 MG tablet Take 2 tablets by mouth daily  Brittany Hernandez MD   bumetanide (BUMEX) 2 MG tablet Take 2 mg by mouth three times a week Mon-Wed-Fri  Historical Provider, MD   ferrous sulfate (IRON 325) 325 (65 Fe) MG tablet Take 1 tablet by mouth daily (with breakfast)  Dina Posada MD   magnesium oxide (MAG-OX) 400 (240 Mg) MG tablet Take 1 tablet by mouth daily  Dina Posada

## 2023-04-27 NOTE — PLAN OF CARE
Problem: Chronic Conditions and Co-morbidities  Goal: Patient's chronic conditions and co-morbidity symptoms are monitored and maintained or improved  Flowsheets (Taken 4/27/2023 0609)  Care Plan - Patient's Chronic Conditions and Co-Morbidity Symptoms are Monitored and Maintained or Improved: Monitor and assess patient's chronic conditions and comorbid symptoms for stability, deterioration, or improvement

## 2023-05-04 ENCOUNTER — HOSPITAL ENCOUNTER (OUTPATIENT)
Dept: OTHER | Age: 68
Setting detail: THERAPIES SERIES
Discharge: HOME | End: 2023-05-04
Payer: MEDICARE

## 2023-05-04 VITALS
DIASTOLIC BLOOD PRESSURE: 68 MMHG | BODY MASS INDEX: 36.02 KG/M2 | RESPIRATION RATE: 18 BRPM | OXYGEN SATURATION: 93 % | WEIGHT: 230 LBS | SYSTOLIC BLOOD PRESSURE: 151 MMHG | HEART RATE: 73 BPM

## 2023-05-04 DIAGNOSIS — Z79.4 TYPE 2 DIABETES MELLITUS WITH DIABETIC AUTONOMIC NEUROPATHY, WITH LONG-TERM CURRENT USE OF INSULIN (HCC): ICD-10-CM

## 2023-05-04 DIAGNOSIS — E11.43 TYPE 2 DIABETES MELLITUS WITH DIABETIC AUTONOMIC NEUROPATHY, WITH LONG-TERM CURRENT USE OF INSULIN (HCC): ICD-10-CM

## 2023-05-04 LAB
ANION GAP SERPL CALCULATED.3IONS-SCNC: 9 MMOL/L (ref 7–16)
BNP BLD-MCNC: 1017 PG/ML (ref 0–125)
BUN SERPL-MCNC: 22 MG/DL (ref 6–23)
CALCIUM SERPL-MCNC: 9.5 MG/DL (ref 8.6–10.2)
CHLORIDE SERPL-SCNC: 94 MMOL/L (ref 98–107)
CO2 SERPL-SCNC: 28 MMOL/L (ref 22–29)
CREAT SERPL-MCNC: 1 MG/DL (ref 0.7–1.2)
GLUCOSE SERPL-MCNC: 226 MG/DL (ref 74–99)
POTASSIUM SERPL-SCNC: 3.9 MMOL/L (ref 3.5–5)
SODIUM SERPL-SCNC: 131 MMOL/L (ref 132–146)

## 2023-05-04 PROCEDURE — 96374 THER/PROPH/DIAG INJ IV PUSH: CPT

## 2023-05-04 PROCEDURE — 36415 COLL VENOUS BLD VENIPUNCTURE: CPT

## 2023-05-04 PROCEDURE — 80048 BASIC METABOLIC PNL TOTAL CA: CPT

## 2023-05-04 PROCEDURE — 2500000003 HC RX 250 WO HCPCS: Performed by: STUDENT IN AN ORGANIZED HEALTH CARE EDUCATION/TRAINING PROGRAM

## 2023-05-04 PROCEDURE — 99214 OFFICE O/P EST MOD 30 MIN: CPT

## 2023-05-04 PROCEDURE — 83880 ASSAY OF NATRIURETIC PEPTIDE: CPT

## 2023-05-04 PROCEDURE — 2580000003 HC RX 258: Performed by: STUDENT IN AN ORGANIZED HEALTH CARE EDUCATION/TRAINING PROGRAM

## 2023-05-04 RX ORDER — FERROUS SULFATE 325(65) MG
325 TABLET ORAL
Qty: 90 TABLET | Refills: 1 | Status: SHIPPED | OUTPATIENT
Start: 2023-05-04

## 2023-05-04 RX ORDER — SODIUM CHLORIDE 0.9 % (FLUSH) 0.9 %
10 SYRINGE (ML) INJECTION PRN
Status: DISCONTINUED | OUTPATIENT
Start: 2023-05-04 | End: 2023-05-05 | Stop reason: HOSPADM

## 2023-05-04 RX ORDER — ATORVASTATIN CALCIUM 40 MG/1
TABLET, FILM COATED ORAL
Qty: 90 TABLET | Refills: 1 | Status: SHIPPED | OUTPATIENT
Start: 2023-05-04

## 2023-05-04 RX ORDER — BUMETANIDE 0.25 MG/ML
2 INJECTION INTRAMUSCULAR; INTRAVENOUS ONCE
Status: COMPLETED | OUTPATIENT
Start: 2023-05-04 | End: 2023-05-04

## 2023-05-04 RX ORDER — METOPROLOL SUCCINATE 25 MG/1
25 TABLET, EXTENDED RELEASE ORAL DAILY
Qty: 90 TABLET | Refills: 1 | Status: SHIPPED | OUTPATIENT
Start: 2023-05-04

## 2023-05-04 RX ADMIN — BUMETANIDE 2 MG: 0.25 INJECTION INTRAMUSCULAR; INTRAVENOUS at 09:33

## 2023-05-04 RX ADMIN — SODIUM CHLORIDE, PRESERVATIVE FREE 10 ML: 5 INJECTION INTRAVENOUS at 09:33

## 2023-05-04 NOTE — PROGRESS NOTES
Congestive Heart Failure [address]         Gayle Nogueira   1955        Referring Provider: Edgardo Alexis  Primary Care Physician: [address]  Cardiologist: NOT ESTABLISHED YET/ EP DR. Neha Moody  Nephrologist:  [facility]      History of Present Illness:     Gayle Nogueira is a 69 y.o. male with a history of HFpEF, most recent EF 55-60% on 9-20-21. Patient Story:    He does  have dyspnea with exertion, shortness of breath, or decline in overall functional capacity. He does not have orthopnea, but he sleeps in a recliner, PND, nocturnal cough or hemoptysis. He denies have abdominal distention or bloating, early satiety, anorexia/change in appetite. He does have a good urinary response to oral diuretic. He does have lower extremity edema. He denies lightheadedness, dizziness. He denies palpitations, syncope or near syncope. He does not complain of chest pain, pressure, discomfort. No Known Allergies        Prior to Visit Medications    Medication Sig Taking? Authorizing Provider   insulin glargine (BASAGLAR KWIKPEN) 100 UNIT/ML injection pen Inject 35 Units into the skin daily Do not administer if blood sugar is below 180. PAP medication.   Davian Vargas MD   Potassium 99 MG TABS Take by mouth daily  Historical Provider, MD   levothyroxine (SYNTHROID) 88 MCG tablet Take 1 tablet by mouth Daily  Lizeth Tamayo., DO   empagliflozin (JARDIANCE) 25 MG tablet Take 1 tablet by mouth daily  Historical Provider, MD   allopurinol (ZYLOPRIM) 100 MG tablet Take 2 tablets by mouth daily  Joelle Concepcion MD   bumetanide (BUMEX) 2 MG tablet Take 1 tablet by mouth three times a week Mon-Wed-Fri  Historical Provider, MD   ferrous sulfate (IRON 325) 325 (65 Fe) MG tablet Take 1 tablet by mouth daily (with breakfast)  Jose Miguel Griggs MD   magnesium oxide (MAG-OX) 400 (240 Mg) MG tablet Take 1 tablet by mouth daily  Patient not taking: Reported on

## 2023-05-11 ENCOUNTER — HOSPITAL ENCOUNTER (OUTPATIENT)
Dept: OTHER | Age: 68
Setting detail: THERAPIES SERIES
Discharge: HOME | End: 2023-05-11
Payer: MEDICARE

## 2023-05-11 VITALS
BODY MASS INDEX: 35.71 KG/M2 | DIASTOLIC BLOOD PRESSURE: 68 MMHG | RESPIRATION RATE: 18 BRPM | OXYGEN SATURATION: 94 % | WEIGHT: 228 LBS | SYSTOLIC BLOOD PRESSURE: 149 MMHG | HEART RATE: 70 BPM

## 2023-05-11 LAB
ANION GAP SERPL CALCULATED.3IONS-SCNC: 9 MMOL/L (ref 7–16)
BNP BLD-MCNC: 863 PG/ML (ref 0–125)
BUN SERPL-MCNC: 24 MG/DL (ref 6–23)
CALCIUM SERPL-MCNC: 9.1 MG/DL (ref 8.6–10.2)
CHLORIDE SERPL-SCNC: 95 MMOL/L (ref 98–107)
CO2 SERPL-SCNC: 27 MMOL/L (ref 22–29)
CREAT SERPL-MCNC: 1.1 MG/DL (ref 0.7–1.2)
GLUCOSE SERPL-MCNC: 263 MG/DL (ref 74–99)
POTASSIUM SERPL-SCNC: 3.9 MMOL/L (ref 3.5–5)
SODIUM SERPL-SCNC: 131 MMOL/L (ref 132–146)

## 2023-05-11 PROCEDURE — 99214 OFFICE O/P EST MOD 30 MIN: CPT

## 2023-05-11 PROCEDURE — 96374 THER/PROPH/DIAG INJ IV PUSH: CPT

## 2023-05-11 PROCEDURE — 2580000003 HC RX 258: Performed by: STUDENT IN AN ORGANIZED HEALTH CARE EDUCATION/TRAINING PROGRAM

## 2023-05-11 PROCEDURE — 36415 COLL VENOUS BLD VENIPUNCTURE: CPT

## 2023-05-11 PROCEDURE — 80048 BASIC METABOLIC PNL TOTAL CA: CPT

## 2023-05-11 PROCEDURE — 2500000003 HC RX 250 WO HCPCS: Performed by: STUDENT IN AN ORGANIZED HEALTH CARE EDUCATION/TRAINING PROGRAM

## 2023-05-11 PROCEDURE — 83880 ASSAY OF NATRIURETIC PEPTIDE: CPT

## 2023-05-11 RX ORDER — SODIUM CHLORIDE 0.9 % (FLUSH) 0.9 %
10 SYRINGE (ML) INJECTION 2 TIMES DAILY
Status: DISCONTINUED | OUTPATIENT
Start: 2023-05-11 | End: 2023-05-12 | Stop reason: HOSPADM

## 2023-05-11 RX ORDER — BUMETANIDE 0.25 MG/ML
2 INJECTION INTRAMUSCULAR; INTRAVENOUS ONCE
Status: COMPLETED | OUTPATIENT
Start: 2023-05-11 | End: 2023-05-11

## 2023-05-11 RX ADMIN — SODIUM CHLORIDE, PRESERVATIVE FREE 10 ML: 5 INJECTION INTRAVENOUS at 07:50

## 2023-05-11 RX ADMIN — BUMETANIDE 2 MG: 0.25 INJECTION INTRAMUSCULAR; INTRAVENOUS at 07:50

## 2023-05-11 NOTE — PLAN OF CARE
Problem: Chronic Conditions and Co-morbidities  Goal: Patient's chronic conditions and co-morbidity symptoms are monitored and maintained or improved  Flowsheets (Taken 5/11/2023 5602)  Care Plan - Patient's Chronic Conditions and Co-Morbidity Symptoms are Monitored and Maintained or Improved: Monitor and assess patient's chronic conditions and comorbid symptoms for stability, deterioration, or improvement

## 2023-05-11 NOTE — PROGRESS NOTES
Congestive Heart Failure [address]         Boni Croft   1955        Referring Provider: Edgardo Alexis  Primary Care Physician: [address]  Cardiologist: NOT ESTABLISHED YET/ EP DR. Star Zheng  Nephrologist:  [facility]      History of Present Illness:     Boni Croft is a 69 y.o. male with a history of HFpEF, most recent EF 55-60% on 9-20-21. Patient Story:    He does  have dyspnea with exertion, shortness of breath, or decline in overall functional capacity. He does not have orthopnea, but he sleeps in a recliner, PND, nocturnal cough or hemoptysis. He denies have abdominal distention or bloating, early satiety, anorexia/change in appetite. He does have a good urinary response to oral diuretic. He does have lower extremity edema. He denies lightheadedness, dizziness. He denies palpitations, syncope or near syncope. He does not complain of chest pain, pressure, discomfort. No Known Allergies        Prior to Visit Medications    Medication Sig Taking? Authorizing Provider   empagliflozin (JARDIANCE) 25 MG tablet Take 1 tablet by mouth daily  Megan Georges MD   ferrous sulfate (IRON 325) 325 (65 Fe) MG tablet Take 1 tablet by mouth daily (with breakfast)  Megan Georges MD   atorvastatin (LIPITOR) 40 MG tablet TAKE ONE TABLET BY MOUTH DAILY  Megan Georges MD   metoprolol succinate (TOPROL XL) 25 MG extended release tablet Take 1 tablet by mouth daily  Megan Georges MD   apixaban (ELIQUIS) 5 MG TABS tablet Take 1 tablet by mouth 2 times daily  Megan Georges MD   insulin glargine [facility]) 100 UNIT/ML injection pen Inject 35 Units into the skin daily Do not administer if blood sugar is below 180. PAP medication.   Bard Royal MD   Potassium 99 MG TABS Take by mouth daily  Historical Provider, MD   levothyroxine (SYNTHROID) 88 MCG tablet Take 1 tablet by mouth Daily  John Link., DO   allopurinol (ZYLOPRIM) 100 MG tablet Take 2

## 2023-05-15 ENCOUNTER — OFFICE VISIT (OUTPATIENT)
Dept: INTERNAL MEDICINE | Age: 68
End: 2023-05-15
Payer: MEDICARE

## 2023-05-15 VITALS
HEART RATE: 64 BPM | RESPIRATION RATE: 18 BRPM | OXYGEN SATURATION: 96 % | SYSTOLIC BLOOD PRESSURE: 120 MMHG | HEIGHT: 67 IN | BODY MASS INDEX: 35.16 KG/M2 | WEIGHT: 224 LBS | TEMPERATURE: 97.2 F | DIASTOLIC BLOOD PRESSURE: 71 MMHG

## 2023-05-15 DIAGNOSIS — E03.9 ACQUIRED HYPOTHYROIDISM: ICD-10-CM

## 2023-05-15 DIAGNOSIS — E11.69 DIABETES MELLITUS TYPE 2 IN OBESE (HCC): Primary | ICD-10-CM

## 2023-05-15 DIAGNOSIS — E11.43 TYPE 2 DIABETES MELLITUS WITH DIABETIC AUTONOMIC NEUROPATHY, WITH LONG-TERM CURRENT USE OF INSULIN (HCC): ICD-10-CM

## 2023-05-15 DIAGNOSIS — Z79.4 TYPE 2 DIABETES MELLITUS WITH DIABETIC AUTONOMIC NEUROPATHY, WITH LONG-TERM CURRENT USE OF INSULIN (HCC): ICD-10-CM

## 2023-05-15 DIAGNOSIS — E66.01 SEVERE OBESITY (BMI 35.0-39.9) WITH COMORBIDITY (HCC): ICD-10-CM

## 2023-05-15 DIAGNOSIS — E66.9 DIABETES MELLITUS TYPE 2 IN OBESE (HCC): Primary | ICD-10-CM

## 2023-05-15 DIAGNOSIS — Z71.89 ACP (ADVANCE CARE PLANNING): ICD-10-CM

## 2023-05-15 DIAGNOSIS — M1A.9XX0 CHRONIC GOUT WITHOUT TOPHUS, UNSPECIFIED CAUSE, UNSPECIFIED SITE: ICD-10-CM

## 2023-05-15 DIAGNOSIS — I50.22 CHRONIC SYSTOLIC (CONGESTIVE) HEART FAILURE (HCC): ICD-10-CM

## 2023-05-15 DIAGNOSIS — Z23 NEED FOR SHINGLES VACCINE: ICD-10-CM

## 2023-05-15 DIAGNOSIS — Z00.00 MEDICARE ANNUAL WELLNESS VISIT, SUBSEQUENT: ICD-10-CM

## 2023-05-15 LAB — HBA1C MFR BLD: 9.5 %

## 2023-05-15 PROCEDURE — 3074F SYST BP LT 130 MM HG: CPT | Performed by: STUDENT IN AN ORGANIZED HEALTH CARE EDUCATION/TRAINING PROGRAM

## 2023-05-15 PROCEDURE — 1124F ACP DISCUSS-NO DSCNMKR DOCD: CPT | Performed by: STUDENT IN AN ORGANIZED HEALTH CARE EDUCATION/TRAINING PROGRAM

## 2023-05-15 PROCEDURE — 83036 HEMOGLOBIN GLYCOSYLATED A1C: CPT | Performed by: STUDENT IN AN ORGANIZED HEALTH CARE EDUCATION/TRAINING PROGRAM

## 2023-05-15 PROCEDURE — G0439 PPPS, SUBSEQ VISIT: HCPCS | Performed by: STUDENT IN AN ORGANIZED HEALTH CARE EDUCATION/TRAINING PROGRAM

## 2023-05-15 PROCEDURE — 99212 OFFICE O/P EST SF 10 MIN: CPT | Performed by: STUDENT IN AN ORGANIZED HEALTH CARE EDUCATION/TRAINING PROGRAM

## 2023-05-15 PROCEDURE — 3046F HEMOGLOBIN A1C LEVEL >9.0%: CPT | Performed by: STUDENT IN AN ORGANIZED HEALTH CARE EDUCATION/TRAINING PROGRAM

## 2023-05-15 PROCEDURE — 3078F DIAST BP <80 MM HG: CPT | Performed by: STUDENT IN AN ORGANIZED HEALTH CARE EDUCATION/TRAINING PROGRAM

## 2023-05-15 RX ORDER — HYDROCODONE BITARTRATE AND ACETAMINOPHEN 7.5; 325 MG/1; MG/1
TABLET ORAL
COMMUNITY
Start: 2023-05-05

## 2023-05-15 RX ORDER — LANCETS 30 GAUGE
EACH MISCELLANEOUS
Qty: 200 EACH | Refills: 1 | Status: SHIPPED | OUTPATIENT
Start: 2023-05-15

## 2023-05-15 RX ORDER — ZOSTER VACCINE RECOMBINANT, ADJUVANTED 50 MCG/0.5
0.5 KIT INTRAMUSCULAR ONCE
Qty: 0.5 ML | Refills: 0 | Status: SHIPPED | OUTPATIENT
Start: 2023-05-15 | End: 2023-05-15

## 2023-05-15 RX ORDER — LEVOTHYROXINE SODIUM 88 UG/1
88 TABLET ORAL DAILY
Qty: 90 TABLET | Refills: 2 | Status: SHIPPED | OUTPATIENT
Start: 2023-05-15 | End: 2023-11-11

## 2023-05-15 RX ORDER — BLOOD SUGAR DIAGNOSTIC
STRIP MISCELLANEOUS
Qty: 200 EACH | Refills: 1 | Status: SHIPPED | OUTPATIENT
Start: 2023-05-15

## 2023-05-15 RX ORDER — PEN NEEDLE, DIABETIC 31 G X1/4"
NEEDLE, DISPOSABLE MISCELLANEOUS
Qty: 100 EACH | Refills: 3 | Status: SHIPPED | OUTPATIENT
Start: 2023-05-15

## 2023-05-15 RX ORDER — INSULIN GLARGINE 100 [IU]/ML
40 INJECTION, SOLUTION SUBCUTANEOUS DAILY
Qty: 20 ADJUSTABLE DOSE PRE-FILLED PEN SYRINGE | Refills: 0 | Status: SHIPPED | OUTPATIENT
Start: 2023-05-15

## 2023-05-15 SDOH — ECONOMIC STABILITY: FOOD INSECURITY: WITHIN THE PAST 12 MONTHS, YOU WORRIED THAT YOUR FOOD WOULD RUN OUT BEFORE YOU GOT MONEY TO BUY MORE.: NEVER TRUE

## 2023-05-15 SDOH — ECONOMIC STABILITY: INCOME INSECURITY: HOW HARD IS IT FOR YOU TO PAY FOR THE VERY BASICS LIKE FOOD, HOUSING, MEDICAL CARE, AND HEATING?: SOMEWHAT HARD

## 2023-05-15 SDOH — ECONOMIC STABILITY: FOOD INSECURITY: WITHIN THE PAST 12 MONTHS, THE FOOD YOU BOUGHT JUST DIDN'T LAST AND YOU DIDN'T HAVE MONEY TO GET MORE.: NEVER TRUE

## 2023-05-15 ASSESSMENT — PATIENT HEALTH QUESTIONNAIRE - PHQ9
2. FEELING DOWN, DEPRESSED OR HOPELESS: 0
SUM OF ALL RESPONSES TO PHQ QUESTIONS 1-9: 0
1. LITTLE INTEREST OR PLEASURE IN DOING THINGS: 0
SUM OF ALL RESPONSES TO PHQ9 QUESTIONS 1 & 2: 0

## 2023-05-15 ASSESSMENT — LIFESTYLE VARIABLES: HOW OFTEN DO YOU HAVE A DRINK CONTAINING ALCOHOL: NEVER

## 2023-05-15 NOTE — PROGRESS NOTES
Medicare Annual Wellness Visit    Bernardo Rocha is here for Medicare AWV, Diabetes, and Hypertension    Assessment & Plan   Diabetes mellitus type 2 in obese ([facility] [address]  -     POCT glycosylated hemoglobin (Hb A1C)  -     insulin glargine (BASAGLAR KWIKPEN) 100 UNIT/ML injection pen; Inject 40 Units into the skin daily Do not administer if blood sugar is below 180. PAP medication. , Disp-20 Adjustable Dose Pre-filled Pen Syringe, R-0Normal  Medicare annual wellness visit, subsequent  Chronic gout without tophus, unspecified cause, unspecified site  -     Uric Acid; Future  Need for shingles vaccine  -     zoster recombinant adjuvanted vaccine [facility]) 50 MCG/0.5ML SUSR injection; Inject 0.5 mLs into the muscle once for 1 dose, Disp-0.5 mL, R-0Print  Type 2 diabetes mellitus with diabetic autonomic neuropathy, with long-term current use of insulin (HCC)  -     Insulin Pen Needle (PEN NEEDLES) 31G X 6 MM MISC; Disp-100 each, R-3, NormalOnce daily. May substitute brand for insurance coverage.  -     Lancets MISC; Disp-200 each, R-1, NormalGive Delica lancets. Tests twice a day A.M. And P.M  -     blood glucose test strips (ONETOUCH VERIO) strip; TEST IN THE MORNING AND IN THE EVENING, Disp-200 each, R-1Normal  Acquired hypothyroidism  -     levothyroxine (SYNTHROID) 88 MCG tablet; Take 1 tablet by mouth Daily, Disp-90 tablet, R-2Requesting 1 year supplyNormal  Severe obesity (BMI 35.0-39. 9) with comorbidity ([facility] [address]  Chronic systolic (congestive) heart failure  ACP (advance care planning)  -     [facility] Referral to ACP Clinical Specialist    Recommendations for Preventive Services Due: see orders and patient instructions/AVS.  Recommended screening schedule for the next 5-10 years is provided to the patient in written form: see Patient Instructions/AVS.     Return in about 2 weeks (around 5/29/2023) for BG check then 3 months for PCP follow up.      Sydni Maki is a 69 y.o.male with PMH of HFpEF (65%), HTN, HLD,

## 2023-05-15 NOTE — PATIENT INSTRUCTIONS
Dear Elle Rocha,    Thank you for coming to your appointment today. I hope we have addressed all of your needs. Please make sure to do the following:  - Continue your medications as listed. - We increased the Basaglar dose to 40u daily, continue to hold it when your blood sugar is below 180  - Bring blood sugar logs for next visit  - Get labs drawn before our next follow up. - Get your second dose of the shingles vaccine at the pharmacy  - Referrals have been made to ACP specialist:  If you do not hear from the office in 1 week, please call the number listed. - We will see each other again in 2 week for blood sugar check and then 3 months    Call for a sooner appointment if you develop any new or worsening symptoms. Have a great day! Sincerely,  Anuja Hutchison M.D  5/15/2023  10:53 AM       Preventing Falls: Care Instructions  Overview     Getting around your home safely can be a challenge if you have injuries or health problems that make it easy for you to fall. Loose rugs and furniture in walkways are among the dangers for many older people who have problems walking or who have poor eyesight. People who have conditions such as arthritis, osteoporosis, or dementia also have to be careful not to fall. You can make your home safer with a few simple measures. Follow-up care is a key part of your treatment and safety. Be sure to make and go to all appointments, and call your doctor if you are having problems. It's also a good idea to know your test results and keep a list of the medicines you take. How can you care for yourself at home? Taking care of yourself  Exercise regularly to improve your strength, muscle tone, and balance. Walk if you can. Swimming may be a good choice if you cannot walk easily. Have your vision and hearing checked each year or any time you notice a change. If you have trouble seeing and hearing, you might not be able to avoid objects and could lose your balance.   Know

## 2023-05-15 NOTE — PROGRESS NOTES
[facility]    Attending Physician Statement  I have discussed the case, including pertinent history and exam findings with the resident physician. I agree with the assessment, plan and orders as documented by the resident. I have reviewed all pertinent PMHx, PSHx, FamHx, SocialHx, medications, and allergies and updated history as appropriate. Patient here for Annual Wellness Visit. Scalp lesion - long-standing. Surgery excised and found to be basal cell. Derm referral was placed for lip lesion. This was frozen. DM - difficult to control. HbA1c - 9.5. Insulin dose was decreased secondary to difficulty with dosing and hypoglycemic episodes. Roberto All was increased and short acting was stopped. Agree with increase of basaglar to 40 Units    Patient to return in 2 weeks with blood sugar log for further titration   Continue empagliflozin    CHF - follows with CHF clinic. No change in symptoms. Taking bumex and this is being titrated. Currently on every other day dosing. Hyperlipidemia - stable. Continue statin. Sees Dr. Juju Meza for CKD. A-fib  - on B-blocker and apixaban. No new issues and follows with cardiology. Continue present management. AWV - Concerned for falls - has Life Alert and feels improved on current medication regimen. Interested in meeting with ACP specialist.   Not interested in hearing evaluation at this time. Remainder of medical problems as per resident note.     Edita Dent MD  5/15/2023 11:00 AM

## 2023-05-16 ENCOUNTER — CLINICAL DOCUMENTATION (OUTPATIENT)
Dept: SPIRITUAL SERVICES | Age: 68
End: 2023-05-16

## 2023-05-25 ENCOUNTER — HOSPITAL ENCOUNTER (OUTPATIENT)
Dept: OTHER | Age: 68
Setting detail: THERAPIES SERIES
Discharge: HOME | End: 2023-05-25
Payer: MEDICARE

## 2023-05-25 VITALS
DIASTOLIC BLOOD PRESSURE: 58 MMHG | SYSTOLIC BLOOD PRESSURE: 121 MMHG | BODY MASS INDEX: 34.77 KG/M2 | WEIGHT: 222 LBS | OXYGEN SATURATION: 97 % | HEART RATE: 64 BPM

## 2023-05-25 LAB
ANION GAP SERPL CALCULATED.3IONS-SCNC: 12 MMOL/L (ref 7–16)
BNP BLD-MCNC: 1171 PG/ML (ref 0–125)
BUN SERPL-MCNC: 28 MG/DL (ref 6–23)
CALCIUM SERPL-MCNC: 9.3 MG/DL (ref 8.6–10.2)
CHLORIDE SERPL-SCNC: 97 MMOL/L (ref 98–107)
CO2 SERPL-SCNC: 27 MMOL/L (ref 22–29)
CREAT SERPL-MCNC: 1.1 MG/DL (ref 0.7–1.2)
GLUCOSE SERPL-MCNC: 243 MG/DL (ref 74–99)
POTASSIUM SERPL-SCNC: 3.9 MMOL/L (ref 3.5–5)
SODIUM SERPL-SCNC: 136 MMOL/L (ref 132–146)
URATE SERPL-MCNC: 7.3 MG/DL (ref 3.4–7)

## 2023-05-25 PROCEDURE — 99214 OFFICE O/P EST MOD 30 MIN: CPT

## 2023-05-25 PROCEDURE — 84550 ASSAY OF BLOOD/URIC ACID: CPT

## 2023-05-25 PROCEDURE — 80048 BASIC METABOLIC PNL TOTAL CA: CPT

## 2023-05-25 PROCEDURE — 36415 COLL VENOUS BLD VENIPUNCTURE: CPT

## 2023-05-25 PROCEDURE — 83880 ASSAY OF NATRIURETIC PEPTIDE: CPT

## 2023-05-25 NOTE — PLAN OF CARE
Problem: Chronic Conditions and Co-morbidities  Goal: Patient's chronic conditions and co-morbidity symptoms are monitored and maintained or improved  Flowsheets (Taken 5/25/2023 7317)  Care Plan - Patient's Chronic Conditions and Co-Morbidity Symptoms are Monitored and Maintained or Improved: Monitor and assess patient's chronic conditions and comorbid symptoms for stability, deterioration, or improvement

## 2023-05-30 ASSESSMENT — ENCOUNTER SYMPTOMS
ABDOMINAL PAIN: 0
DIARRHEA: 0
NAUSEA: 0
CONSTIPATION: 0
SHORTNESS OF BREATH: 0
BLOOD IN STOOL: 0
VOMITING: 0
BACK PAIN: 0
COUGH: 0

## 2023-05-31 ENCOUNTER — OFFICE VISIT (OUTPATIENT)
Dept: INTERNAL MEDICINE | Age: 68
End: 2023-05-31
Payer: MEDICARE

## 2023-05-31 VITALS
HEART RATE: 72 BPM | TEMPERATURE: 98.1 F | WEIGHT: 226.2 LBS | BODY MASS INDEX: 35.5 KG/M2 | HEIGHT: 67 IN | OXYGEN SATURATION: 96 % | SYSTOLIC BLOOD PRESSURE: 121 MMHG | RESPIRATION RATE: 20 BRPM | DIASTOLIC BLOOD PRESSURE: 64 MMHG

## 2023-05-31 DIAGNOSIS — Z79.4 TYPE 2 DIABETES MELLITUS WITH DIABETIC NEUROPATHY, WITH LONG-TERM CURRENT USE OF INSULIN (HCC): ICD-10-CM

## 2023-05-31 DIAGNOSIS — E11.40 TYPE 2 DIABETES MELLITUS WITH DIABETIC NEUROPATHY, WITH LONG-TERM CURRENT USE OF INSULIN (HCC): ICD-10-CM

## 2023-05-31 PROCEDURE — 99213 OFFICE O/P EST LOW 20 MIN: CPT | Performed by: INTERNAL MEDICINE

## 2023-05-31 PROCEDURE — 3046F HEMOGLOBIN A1C LEVEL >9.0%: CPT | Performed by: INTERNAL MEDICINE

## 2023-05-31 PROCEDURE — 1124F ACP DISCUSS-NO DSCNMKR DOCD: CPT | Performed by: INTERNAL MEDICINE

## 2023-05-31 PROCEDURE — 3074F SYST BP LT 130 MM HG: CPT | Performed by: INTERNAL MEDICINE

## 2023-05-31 PROCEDURE — 3078F DIAST BP <80 MM HG: CPT | Performed by: INTERNAL MEDICINE

## 2023-05-31 NOTE — ACP (ADVANCE CARE PLANNING)
Advance Care Planning   Ambulatory ACP Specialist Patient Outreach    Date:  5/31/2023  ACP Specialist:  Azra Hercules    Outreach call to patient in follow-up to ACP Specialist referral from: Giorgi Chavarria MD    [x] PCP  [] Provider   [] Ambulatory Care Management [] Other for Reason:    [x] Advance Directive Assistance  [] Code Status Discussion  [] Complete Portable DNR Order  [] Discuss Goals of Care  [] Complete POST/MOST  [] Early ACP Decision-Making  [] Other    Date Referral Received:5/16/2023    Today's Outreach:  [] First   [] Second  [x] Third                               Third outreach made by []  phone  [] email []   [employer]     Intervention:  [] Spoke with Patient  [x] Left VM requesting return call      Outcome: I left a voice message and closing referral at this time. I am available should Rom Coreas want to complete the documents. Next Step:   [] ACP scheduled conversation  [] Outreach again in one week               [] Email / Mail ACP Info Sheets  [] Email / Mail Advance Directive            [x] Close Referral. Routing closure to referring provider/staff and to ACP Specialist . [] Closure Letter mailed to Patient with Invitation to Contact ACP Specialist if/when ready.     Thank you for this referral.

## 2023-06-01 PROBLEM — Z79.4 TYPE 2 DIABETES MELLITUS WITH DIABETIC NEUROPATHY, WITH LONG-TERM CURRENT USE OF INSULIN (HCC): Status: ACTIVE | Noted: 2023-06-01

## 2023-06-01 PROBLEM — E11.40 TYPE 2 DIABETES MELLITUS WITH DIABETIC NEUROPATHY, WITH LONG-TERM CURRENT USE OF INSULIN (HCC): Status: ACTIVE | Noted: 2023-06-01

## 2023-06-01 PROCEDURE — 99212 OFFICE O/P EST SF 10 MIN: CPT | Performed by: INTERNAL MEDICINE

## 2023-06-08 ENCOUNTER — OFFICE VISIT (OUTPATIENT)
Dept: INTERNAL MEDICINE | Age: 68
End: 2023-06-08
Payer: MEDICARE

## 2023-06-08 VITALS
DIASTOLIC BLOOD PRESSURE: 59 MMHG | HEART RATE: 71 BPM | WEIGHT: 222.2 LBS | SYSTOLIC BLOOD PRESSURE: 122 MMHG | HEIGHT: 67 IN | TEMPERATURE: 97.2 F | BODY MASS INDEX: 34.88 KG/M2 | OXYGEN SATURATION: 97 % | RESPIRATION RATE: 20 BRPM

## 2023-06-08 DIAGNOSIS — E11.40 TYPE 2 DIABETES MELLITUS WITH DIABETIC NEUROPATHY, WITH LONG-TERM CURRENT USE OF INSULIN (HCC): Primary | ICD-10-CM

## 2023-06-08 DIAGNOSIS — Z79.4 TYPE 2 DIABETES MELLITUS WITH DIABETIC NEUROPATHY, WITH LONG-TERM CURRENT USE OF INSULIN (HCC): Primary | ICD-10-CM

## 2023-06-08 PROCEDURE — 3074F SYST BP LT 130 MM HG: CPT

## 2023-06-08 PROCEDURE — 99213 OFFICE O/P EST LOW 20 MIN: CPT

## 2023-06-08 PROCEDURE — 3078F DIAST BP <80 MM HG: CPT

## 2023-06-08 PROCEDURE — 1124F ACP DISCUSS-NO DSCNMKR DOCD: CPT

## 2023-06-08 PROCEDURE — 3046F HEMOGLOBIN A1C LEVEL >9.0%: CPT

## 2023-06-08 PROCEDURE — 99212 OFFICE O/P EST SF 10 MIN: CPT

## 2023-06-08 RX ORDER — LISINOPRIL 5 MG/1
5 TABLET ORAL DAILY
Qty: 90 TABLET | Refills: 1 | Status: SHIPPED | OUTPATIENT
Start: 2023-06-08

## 2023-06-08 ASSESSMENT — ENCOUNTER SYMPTOMS
VOMITING: 0
CONSTIPATION: 0
RHINORRHEA: 0
COLOR CHANGE: 0
DIARRHEA: 0
COUGH: 0
BACK PAIN: 0
SHORTNESS OF BREATH: 0
ABDOMINAL PAIN: 0

## 2023-06-08 NOTE — PROGRESS NOTES
Frank Rocha (:  1955) is a 69 y.o. male,Established patient, here for evaluation of the following chief complaint(s):  Diabetes and Follow-up (1 week follow up. Patient monitored Blood sugars just in the am)         ASSESSMENT/PLAN:  1. Type 2 diabetes mellitus with diabetic neuropathy, with long-term current use of insulin ([address]  Assessment & Plan:  Last A1c in 2023 9.5, Microalb/cr 44.7  Currently on 40 units Lantus in the morning  Unsure if he is taking Jardiance  BG log only shows morning blood sugar before taking insulin, consistently less than 200 in the mornings  Has not been holding morning insulin for BG <180  No symptoms suggestive of hypoglycemia  Last [facility] exam: 2023 at [facility]  Last foot exam: 2023 Dr. Lydia Newell on [facility] lisinopril 5 mg daily   Keep BG log, check blood glucose in the morning plus 1 more time daily for the next two weeks  Discuss possibly utilizing CGM at next visit for better glucose control  Call pharmacy to confirm Edwina Alexander is being utilized  Call patient's wife this afternoon and discuss plan   BMP prior to patient's next appointment   Virtual Visit/telephone visit in 2 weeks for BG check  Orders:  -     Basic Metabolic Panel; Future  -     lisinopril (PRINIVIL;ZESTRIL) 5 MG tablet; Take 1 tablet by mouth daily, Disp-90 tablet, R-1Normal      Return in about 2 weeks (around 2023) for virtual visit, blood sugar check, labs. Discuss possible CGM next visit        Subjective   SUBJECTIVE/OBJECTIVE:  Mr. Naida Nelson is a 68 yo male who presents for follow up of his blood glucose. He was titrated to 40 units of Basaglar insulin in the morning at his last visit due to an increase in his A1c. He was given a blood glucose log, but he has only been checking his blood sugar in the morning before taking insulin. He has not been holding his insulin, even if his BG is <180. Reviewed symptoms of hypoglycemia, he reports no episodes.      Previous labs

## 2023-06-08 NOTE — PROGRESS NOTES
[facility]    Attending Physician Statement  I have discussed the case, including pertinent history and exam findings with the resident physician. I agree with the assessment, plan and orders as documented by the resident. I have reviewed all pertinent PMHx, PSHx, FamHx, SocialHx, medications, and allergies and updated history as appropriate. Patient here for blood sugar follow-up. 1 week follow-up for blood sugars. Patient was to keep a log as there was a large jump in his HbA1c. No hypoglycemia. Has not held insulin below 180. Edwina Alexander still on medication list.  but this was denied by insurance. This was continued. Patient is unsure if he is actually taking this medication. Will contact pharmacy to see if this was dispensed. Continue insulin glargine at 40 Units daily   Patient to contact us if any hypoglycemic episodes. Check on Jardiance. Phone visit in 2 weeks for update of blood sugars and adjustment of insulin. Patient picked up Edwina Alexander yesterday from the pharmacy. Microalbuminuria - not on an ACE-I, but treatment with an ACE-I is indicated. Start lisinopril, 5 mg daily. Remainder of medical problems as per resident note.     Shyanne Cordero MD  6/8/2023 8:46 AM

## 2023-06-08 NOTE — PATIENT INSTRUCTIONS
Dear Jd Rocha,        Thank you for coming to your appointment today. I hope we have addressed all of your needs. Please make sure to do the following:  - Continue your medications as listed. - Get labs drawn before our next follow up. - We will see each other again in 2 weeks for a telephone visit     Call for a sooner appointment if you develop dizziness, lightheadedness, or new dry cough. Have a great day!         Sincerely,  Que Henderson, DO   6/8/2023  8:58 AM

## 2023-06-08 NOTE — ASSESSMENT & PLAN NOTE
Last A1c in 5/2023 9.5, Microalb/cr 44.7  Currently on 40 units Lantus in the morning  Unsure if he is taking Jardiance  BG log only shows morning blood sugar before taking insulin, consistently less than 200 in the mornings  Has not been holding morning insulin for BG <180  No symptoms suggestive of hypoglycemia  Last [facility] exam: 4/2023 at [facility]  Last foot exam: 2/2023 Dr. Mami Becker on [facility] lisinopril 5 mg daily   Keep BG log, check blood glucose in the morning plus 1 more time daily for the next two weeks  Discuss possibly utilizing CGM at next visit for better glucose control  Call pharmacy to confirm Thai Harris is being utilized  Call patient's wife this afternoon and discuss plan   BMP prior to patient's next appointment   Virtual Visit/telephone visit in 2 weeks for BG check

## 2023-06-22 ENCOUNTER — TELEPHONE (OUTPATIENT)
Dept: INTERNAL MEDICINE | Age: 68
End: 2023-06-22

## 2023-06-22 ENCOUNTER — HOSPITAL ENCOUNTER (OUTPATIENT)
Dept: OTHER | Age: 68
Setting detail: THERAPIES SERIES
Discharge: HOME | End: 2023-06-22
Payer: MEDICARE

## 2023-06-22 ENCOUNTER — TELEMEDICINE (OUTPATIENT)
Dept: INTERNAL MEDICINE | Age: 68
End: 2023-06-22
Payer: MEDICARE

## 2023-06-22 VITALS
RESPIRATION RATE: 18 BRPM | SYSTOLIC BLOOD PRESSURE: 114 MMHG | OXYGEN SATURATION: 99 % | BODY MASS INDEX: 36.49 KG/M2 | HEART RATE: 70 BPM | WEIGHT: 233 LBS | DIASTOLIC BLOOD PRESSURE: 55 MMHG

## 2023-06-22 DIAGNOSIS — E11.69 DIABETES MELLITUS TYPE 2 IN OBESE (HCC): ICD-10-CM

## 2023-06-22 DIAGNOSIS — E66.9 DIABETES MELLITUS TYPE 2 IN OBESE (HCC): ICD-10-CM

## 2023-06-22 PROBLEM — E66.01 SEVERE OBESITY (BMI 35.0-39.9) WITH COMORBIDITY (HCC): Status: RESOLVED | Noted: 2023-05-15 | Resolved: 2023-06-22

## 2023-06-22 PROBLEM — L98.9 SKIN LESION OF FACE: Status: RESOLVED | Noted: 2022-11-16 | Resolved: 2023-06-22

## 2023-06-22 LAB
ANION GAP SERPL CALCULATED.3IONS-SCNC: 13 MMOL/L (ref 7–16)
BNP BLD-MCNC: 1079 PG/ML (ref 0–125)
BUN SERPL-MCNC: 48 MG/DL (ref 6–23)
CALCIUM SERPL-MCNC: 9 MG/DL (ref 8.6–10.2)
CHLORIDE SERPL-SCNC: 99 MMOL/L (ref 98–107)
CO2 SERPL-SCNC: 21 MMOL/L (ref 22–29)
CREAT SERPL-MCNC: 1.5 MG/DL (ref 0.7–1.2)
GLUCOSE SERPL-MCNC: 248 MG/DL (ref 74–99)
POTASSIUM SERPL-SCNC: 3.9 MMOL/L (ref 3.5–5)
SODIUM SERPL-SCNC: 133 MMOL/L (ref 132–146)

## 2023-06-22 PROCEDURE — 99214 OFFICE O/P EST MOD 30 MIN: CPT

## 2023-06-22 PROCEDURE — 2580000003 HC RX 258: Performed by: STUDENT IN AN ORGANIZED HEALTH CARE EDUCATION/TRAINING PROGRAM

## 2023-06-22 PROCEDURE — 96375 TX/PRO/DX INJ NEW DRUG ADDON: CPT

## 2023-06-22 PROCEDURE — 99441 PR PHYS/QHP TELEPHONE EVALUATION 5-10 MIN: CPT | Performed by: INTERNAL MEDICINE

## 2023-06-22 PROCEDURE — 80048 BASIC METABOLIC PNL TOTAL CA: CPT

## 2023-06-22 PROCEDURE — 36415 COLL VENOUS BLD VENIPUNCTURE: CPT

## 2023-06-22 PROCEDURE — 83880 ASSAY OF NATRIURETIC PEPTIDE: CPT

## 2023-06-22 PROCEDURE — 2500000003 HC RX 250 WO HCPCS: Performed by: STUDENT IN AN ORGANIZED HEALTH CARE EDUCATION/TRAINING PROGRAM

## 2023-06-22 RX ORDER — ASPIRIN 81 MG/1
81 TABLET ORAL DAILY
COMMUNITY

## 2023-06-22 RX ORDER — SEMAGLUTIDE 0.68 MG/ML
0.25 INJECTION, SOLUTION SUBCUTANEOUS WEEKLY
Qty: 3 ML | Refills: 4 | Status: SHIPPED | OUTPATIENT
Start: 2023-06-22

## 2023-06-22 RX ORDER — BUMETANIDE 0.25 MG/ML
0.5 INJECTION INTRAMUSCULAR; INTRAVENOUS ONCE
Status: COMPLETED | OUTPATIENT
Start: 2023-06-22 | End: 2023-06-22

## 2023-06-22 RX ORDER — SODIUM CHLORIDE 0.9 % (FLUSH) 0.9 %
5-40 SYRINGE (ML) INJECTION 2 TIMES DAILY
Status: DISCONTINUED | OUTPATIENT
Start: 2023-06-22 | End: 2023-06-23 | Stop reason: HOSPADM

## 2023-06-22 RX ORDER — INSULIN GLARGINE 100 [IU]/ML
25 INJECTION, SOLUTION SUBCUTANEOUS 2 TIMES DAILY
Qty: 20 ADJUSTABLE DOSE PRE-FILLED PEN SYRINGE | Refills: 0 | Status: SHIPPED | OUTPATIENT
Start: 2023-06-22

## 2023-06-22 RX ADMIN — SODIUM CHLORIDE, PRESERVATIVE FREE 10 ML: 5 INJECTION INTRAVENOUS at 08:34

## 2023-06-22 RX ADMIN — BUMETANIDE 2 MG: 0.25 INJECTION INTRAMUSCULAR; INTRAVENOUS at 08:34

## 2023-06-22 ASSESSMENT — ENCOUNTER SYMPTOMS
SHORTNESS OF BREATH: 0
DIARRHEA: 0
NAUSEA: 0
COUGH: 0
WHEEZING: 0
VOMITING: 0
CONSTIPATION: 0
ABDOMINAL PAIN: 0

## 2023-06-22 ASSESSMENT — PATIENT HEALTH QUESTIONNAIRE - PHQ9
8. MOVING OR SPEAKING SO SLOWLY THAT OTHER PEOPLE COULD HAVE NOTICED. OR THE OPPOSITE, BEING SO FIGETY OR RESTLESS THAT YOU HAVE BEEN MOVING AROUND A LOT MORE THAN USUAL: NOT AT ALL
6. FEELING BAD ABOUT YOURSELF - OR THAT YOU ARE A FAILURE OR HAVE LET YOURSELF OR YOUR FAMILY DOWN: NOT AT ALL
SUM OF ALL RESPONSES TO PHQ9 QUESTIONS 1 & 2: 0
1. LITTLE INTEREST OR PLEASURE IN DOING THINGS: NOT AT ALL
7. TROUBLE CONCENTRATING ON THINGS, SUCH AS READING THE NEWSPAPER OR WATCHING TELEVISION: NOT AT ALL
3. TROUBLE FALLING OR STAYING ASLEEP: NOT AT ALL
9. THOUGHTS THAT YOU WOULD BE BETTER OFF DEAD, OR OF HURTING YOURSELF: NOT AT ALL
5. POOR APPETITE OR OVEREATING: NOT AT ALL
10. IF YOU CHECKED OFF ANY PROBLEMS, HOW DIFFICULT HAVE THESE PROBLEMS MADE IT FOR YOU TO DO YOUR WORK, TAKE CARE OF THINGS AT HOME, OR GET ALONG WITH OTHER PEOPLE: NOT DIFFICULT AT ALL
2. FEELING DOWN, DEPRESSED OR HOPELESS: NOT AT ALL
SUM OF ALL RESPONSES TO PHQ QUESTIONS 1-9: 1
4. FEELING TIRED OR HAVING LITTLE ENERGY: SEVERAL DAYS

## 2023-06-22 NOTE — TELEPHONE ENCOUNTER
Called pt to review medication for Telephone visit , no answer , left VM , will call again in 10-15 min.

## 2023-06-22 NOTE — PROGRESS NOTES
Last 3 BNP       Pro-BNP (pg/mL)   Date Value   05/25/2023 1,171 (H)   05/11/2023 863 (H)   05/04/2023 1,017 (H)          CBC: No results for input(s): WBC, HGB, PLT in the last 72 hours. BMP:    No results for input(s): NA, K, CL, CO2, BUN, CREATININE, GLUCOSE in the last 72 hours. Hepatic: No results for input(s): AST, ALT, ALB, BILITOT, ALKPHOS in the last 72 hours. Troponin: No results for input(s): TROPONINI in the last 72 hours. BNP: No results for input(s): BNP in the last 72 hours. Lipids: No results for input(s): CHOL, HDL in the last 72 hours. Invalid input(s): LDLCALCU  INR: No results for input(s): INR in the last 72 hours. WEIGHTS:    Wt Readings from Last 3 Encounters:   06/22/23 233 lb (105.7 kg)   06/08/23 222 lb 3.2 oz (100.8 kg)   05/31/23 226 lb 3.2 oz (102.6 kg)         TELEMETRY:  Cardiac Regularity: Regular  Cardiac Rhythm/Interpretation: VPACED        ASSESSMENT:  Ross Rocha's weight is up 11 pounds from his last visit on 5/25/23, No c/o's cp, sob or dizziness. No abdominal fullness, abd rounded, soft. Lower legs 1 plus edema. Negative for JVD. C/o being tired a lot. He admits to eating a lot of [place] food at least once per week, not weighing himself daily. Educated on the importance on watching his sodium intake as well as fluid intake. And keeping a log of his weight and bring it with him on his next visit.      INTERventions completed this visit:  IV diuretics given -YES, Bumex  Lab work obtained yes,  BMP/BNP  Reviewed currently prescribed medications with patient, educated on importance of compliance and answered any questions regarding their medication  Educated on signs and symptoms of HF  Educated on low sodium diet    PLAN:  Scheduled to follow up in CHF clinic on   Future Appointments   Date Time Provider Stacie Mtz   6/22/2023 10:00 AM DO SENG [facility]   8/1/2023 10:15 AM Marguerite Wright MD [address] [facility]     Given clinic phone

## 2023-06-22 NOTE — TELEPHONE ENCOUNTER
Per patient request, called wife Petra Rocha at home this afternoon to discuss with her all med changes made during today's virtual visit. All questions answered, and Petra indicated her understanding of all changes made. Also notified her of date/time of next appt.      Will continue to monitor   Gricel Fleming, , PGY3

## 2023-07-06 ENCOUNTER — HOSPITAL ENCOUNTER (OUTPATIENT)
Dept: OTHER | Age: 68
Setting detail: THERAPIES SERIES
Discharge: HOME | End: 2023-07-06
Payer: MEDICARE

## 2023-07-06 ENCOUNTER — TELEPHONE (OUTPATIENT)
Dept: INTERNAL MEDICINE | Age: 68
End: 2023-07-06

## 2023-07-06 VITALS
DIASTOLIC BLOOD PRESSURE: 53 MMHG | OXYGEN SATURATION: 96 % | SYSTOLIC BLOOD PRESSURE: 103 MMHG | WEIGHT: 224 LBS | BODY MASS INDEX: 35.08 KG/M2 | HEART RATE: 73 BPM | RESPIRATION RATE: 18 BRPM

## 2023-07-06 LAB
ANION GAP SERPL CALCULATED.3IONS-SCNC: 14 MMOL/L (ref 7–16)
BNP BLD-MCNC: 949 PG/ML (ref 0–125)
BUN SERPL-MCNC: 59 MG/DL (ref 6–23)
CALCIUM SERPL-MCNC: 9.3 MG/DL (ref 8.6–10.2)
CHLORIDE SERPL-SCNC: 100 MMOL/L (ref 98–107)
CO2 SERPL-SCNC: 23 MMOL/L (ref 22–29)
CREAT SERPL-MCNC: 1.6 MG/DL (ref 0.7–1.2)
GLUCOSE SERPL-MCNC: 196 MG/DL (ref 74–99)
POTASSIUM SERPL-SCNC: 4.5 MMOL/L (ref 3.5–5)
SODIUM SERPL-SCNC: 137 MMOL/L (ref 132–146)

## 2023-07-06 PROCEDURE — 2580000003 HC RX 258: Performed by: STUDENT IN AN ORGANIZED HEALTH CARE EDUCATION/TRAINING PROGRAM

## 2023-07-06 PROCEDURE — 80048 BASIC METABOLIC PNL TOTAL CA: CPT

## 2023-07-06 PROCEDURE — 99214 OFFICE O/P EST MOD 30 MIN: CPT

## 2023-07-06 PROCEDURE — 36415 COLL VENOUS BLD VENIPUNCTURE: CPT

## 2023-07-06 PROCEDURE — 96374 THER/PROPH/DIAG INJ IV PUSH: CPT

## 2023-07-06 PROCEDURE — 2500000003 HC RX 250 WO HCPCS: Performed by: STUDENT IN AN ORGANIZED HEALTH CARE EDUCATION/TRAINING PROGRAM

## 2023-07-06 PROCEDURE — 83880 ASSAY OF NATRIURETIC PEPTIDE: CPT

## 2023-07-06 RX ORDER — SODIUM CHLORIDE 0.9 % (FLUSH) 0.9 %
5-40 SYRINGE (ML) INJECTION ONCE
Status: COMPLETED | OUTPATIENT
Start: 2023-07-06 | End: 2023-07-06

## 2023-07-06 RX ORDER — DOXYCYCLINE HYCLATE 100 MG/1
100 CAPSULE ORAL 2 TIMES DAILY
COMMUNITY
End: 2023-07-07

## 2023-07-06 RX ORDER — BUMETANIDE 0.25 MG/ML
2 INJECTION INTRAMUSCULAR; INTRAVENOUS ONCE
Status: COMPLETED | OUTPATIENT
Start: 2023-07-06 | End: 2023-07-06

## 2023-07-06 RX ADMIN — BUMETANIDE 2 MG: 0.25 INJECTION INTRAMUSCULAR; INTRAVENOUS at 07:58

## 2023-07-06 RX ADMIN — SODIUM CHLORIDE, PRESERVATIVE FREE 10 ML: 5 INJECTION INTRAVENOUS at 07:59

## 2023-07-06 NOTE — TELEPHONE ENCOUNTER
Left Vm on wife's phone to inform her we need to see Dimitri Irma in the clinic tomorrow at 8am , and to have him please call the office regarding holding medications.

## 2023-07-06 NOTE — TELEPHONE ENCOUNTER
Attempted to call pt to schedule appt for tomorrow, no answer left vm, called CHF clinic, pt has already left, placed pt at 8 am slot at moment to hold need to make sure pt can come in , pt needs to be seen , and to hold lisinopril and jardiance and bumex tablet today and tomorrow until seen , if pt has not already taken them today.

## 2023-07-06 NOTE — PROGRESS NOTES
and answered any questions regarding their medication  Educated on signs and symptoms of HF  Educated on low sodium diet    PLAN:  Scheduled to follow up in CHF clinic on   Future Appointments   Date Time Provider [address]   7/20/2023  8:15 AM [facility] CHF ROOM 1 [facility] Tanner Shah   8/1/2023 10:15 AM Deandra Salas MD [facility]     Given clinic phone number and aware of signs and symptoms to call with any HF change in symptoms.

## 2023-07-06 NOTE — PLAN OF CARE
Problem: Chronic Conditions and Co-morbidities  Goal: Patient's chronic conditions and co-morbidity symptoms are monitored and maintained or improved  Flowsheets (Taken 7/6/2023 7705)  Care Plan - Patient's Chronic Conditions and Co-Morbidity Symptoms are Monitored and Maintained or Improved: Monitor and assess patient's chronic conditions and comorbid symptoms for stability, deterioration, or improvement

## 2023-07-06 NOTE — TELEPHONE ENCOUNTER
Received call from [facility] at [address] [place] [address]. Patient had labs drawn today. [place] states patient's BUN is 59 and seems to be treading up. [place] states patient is asymptomatic.

## 2023-07-07 ENCOUNTER — OFFICE VISIT (OUTPATIENT)
Dept: INTERNAL MEDICINE | Age: 68
End: 2023-07-07
Payer: MEDICARE

## 2023-07-07 VITALS
TEMPERATURE: 97.9 F | WEIGHT: 222.4 LBS | BODY MASS INDEX: 34.91 KG/M2 | HEART RATE: 62 BPM | HEIGHT: 67 IN | OXYGEN SATURATION: 96 % | DIASTOLIC BLOOD PRESSURE: 50 MMHG | SYSTOLIC BLOOD PRESSURE: 100 MMHG | RESPIRATION RATE: 16 BRPM

## 2023-07-07 DIAGNOSIS — N18.32 STAGE 3B CHRONIC KIDNEY DISEASE (HCC): Primary | ICD-10-CM

## 2023-07-07 PROBLEM — C49.20: Status: ACTIVE | Noted: 2023-07-07

## 2023-07-07 PROCEDURE — 99212 OFFICE O/P EST SF 10 MIN: CPT

## 2023-07-07 ASSESSMENT — ENCOUNTER SYMPTOMS
ABDOMINAL PAIN: 0
COUGH: 0
SHORTNESS OF BREATH: 0

## 2023-07-18 PROCEDURE — 93296 REM INTERROG EVL PM/IDS: CPT | Performed by: INTERNAL MEDICINE

## 2023-07-18 PROCEDURE — 93294 REM INTERROG EVL PM/LDLS PM: CPT | Performed by: INTERNAL MEDICINE

## 2023-07-20 ENCOUNTER — APPOINTMENT (OUTPATIENT)
Dept: OTHER | Age: 68
End: 2023-07-20
Payer: MEDICARE

## 2023-07-20 VITALS
SYSTOLIC BLOOD PRESSURE: 121 MMHG | BODY MASS INDEX: 35.87 KG/M2 | RESPIRATION RATE: 18 BRPM | DIASTOLIC BLOOD PRESSURE: 60 MMHG | WEIGHT: 229 LBS | HEART RATE: 65 BPM | OXYGEN SATURATION: 96 %

## 2023-07-20 LAB
ANION GAP SERPL CALCULATED.3IONS-SCNC: 14 MMOL/L (ref 7–16)
BNP SERPL-MCNC: 1268 PG/ML (ref 0–125)
BUN SERPL-MCNC: 34 MG/DL (ref 6–23)
CALCIUM SERPL-MCNC: 9 MG/DL (ref 8.6–10.2)
CHLORIDE SERPL-SCNC: 101 MMOL/L (ref 98–107)
CO2 SERPL-SCNC: 21 MMOL/L (ref 22–29)
CREAT SERPL-MCNC: 1.2 MG/DL (ref 0.7–1.2)
GFR SERPL CREATININE-BSD FRML MDRD: >60 ML/MIN/1.73M2
GLUCOSE SERPL-MCNC: 169 MG/DL (ref 74–99)
POTASSIUM SERPL-SCNC: 3.9 MMOL/L (ref 3.5–5)
SODIUM SERPL-SCNC: 136 MMOL/L (ref 132–146)

## 2023-07-20 PROCEDURE — 99214 OFFICE O/P EST MOD 30 MIN: CPT

## 2023-07-20 PROCEDURE — 2580000003 HC RX 258: Performed by: STUDENT IN AN ORGANIZED HEALTH CARE EDUCATION/TRAINING PROGRAM

## 2023-07-20 PROCEDURE — 2500000003 HC RX 250 WO HCPCS: Performed by: STUDENT IN AN ORGANIZED HEALTH CARE EDUCATION/TRAINING PROGRAM

## 2023-07-20 PROCEDURE — 80048 BASIC METABOLIC PNL TOTAL CA: CPT

## 2023-07-20 PROCEDURE — 83880 ASSAY OF NATRIURETIC PEPTIDE: CPT

## 2023-07-20 PROCEDURE — 96376 TX/PRO/DX INJ SAME DRUG ADON: CPT

## 2023-07-20 RX ORDER — BUMETANIDE 0.25 MG/ML
2 INJECTION INTRAMUSCULAR; INTRAVENOUS ONCE
Status: COMPLETED | OUTPATIENT
Start: 2023-07-20 | End: 2023-07-20

## 2023-07-20 RX ORDER — SODIUM CHLORIDE 0.9 % (FLUSH) 0.9 %
10 SYRINGE (ML) INJECTION 2 TIMES DAILY
Status: DISCONTINUED | OUTPATIENT
Start: 2023-07-20 | End: 2023-07-21 | Stop reason: HOSPADM

## 2023-07-20 RX ADMIN — BUMETANIDE 2 MG: 0.25 INJECTION INTRAMUSCULAR; INTRAVENOUS at 08:32

## 2023-07-20 RX ADMIN — SODIUM CHLORIDE, PRESERVATIVE FREE 10 ML: 5 INJECTION INTRAVENOUS at 08:32

## 2023-07-20 NOTE — PROGRESS NOTES
Congestive Heart Failure [id number] [facility]         Fran Recinos   1955      Referring Provider: [address]  Primary Care Physician: [address]  Cardiologist: NOT ESTABLISHED YET/ EP DR. Shasta Godwin  Nephrologist: DR. Peguero [facility],[address]      History of Present Illness:     Fran Recinos is a 77 y.o. male with a history of HFpEF, most recent EF 65% on 8/22/22. Patient Story:    He does  have dyspnea with exertion, shortness of breath, or decline in overall functional capacity. He does not have orthopnea, but he sleeps in a recliner, PND, nocturnal cough or hemoptysis. He does have abdominal distention or bloating, early satiety, anorexia/change in appetite. He does have a good urinary response to oral diuretic. He does have +1, pitting lower bilateral extremity edema. He denies lightheadedness, dizziness. He denies palpitations, syncope or near syncope. He does not complain of chest pain, pressure, discomfort. No Known Allergies        Prior to Visit Medications    Medication Sig Taking? Authorizing Provider   aspirin 81 MG EC tablet Take 1 tablet by mouth daily  Historical Provider, MD   insulin glargine (BASAGLAR KWIKPEN) 100 UNIT/ML injection pen Inject 25 Units into the skin 2 times daily Do not administer if blood sugar is below 180. PAP medication. Branchdale Fillers, DO   Continuous Blood Gluc  GREG 1 each by Does not apply route continuous  Lindy L Garvin, DO   Continuous Blood Gluc Sensor MISC 1 each by Does not apply route continuous  Lindy L Garvin, DO   Semaglutide,0.25 or 0.5MG/DOS, (OZEMPIC, 0.25 OR 0.5 MG/DOSE,) 2 MG/3ML SOPN Inject 0.25 mg into the skin once a week  Patient not taking: Reported on 7/6/2023  Lindy L Garvin, DO   HYDROcodone-acetaminophen ([address]) 7.5-325 MG per tablet   Historical Provider, MD   Insulin Pen Needle (PEN NEEDLES) 31G X 6 MM MISC Once daily. May substitute brand for insurance coverage.   Girish PARTIDA

## 2023-07-20 NOTE — PROGRESS NOTES
poct hemoglobin AIC 7.1  Patient given phone number to schedule covid vaccine  Patient name also given to manager to be placed on on call list  PAP notified to reorder [address] Insulin   Spoke with patient regarding eliquis, patient states was able to get a 120 day supply for 80 $    Patient has appointment scheduled in march with podiatry, Vitor Neff. [[phone] notes requested]  Patient has appointment scheduled with [facility] for next month. [[phone] notes requested]    Discharge instructions given. Patient verbalizes understanding.   AVS given  Printed prescription for labwork given No

## 2023-07-20 NOTE — PLAN OF CARE
Problem: Chronic Conditions and Co-morbidities  Goal: Patient's chronic conditions and co-morbidity symptoms are monitored and maintained or improved  Flowsheets (Taken 7/20/2023 7538)  Care Plan - Patient's Chronic Conditions and Co-Morbidity Symptoms are Monitored and Maintained or Improved: Monitor and assess patient's chronic conditions and comorbid symptoms for stability, deterioration, or improvement

## 2023-08-01 ENCOUNTER — OFFICE VISIT (OUTPATIENT)
Dept: INTERNAL MEDICINE | Age: 68
End: 2023-08-01
Payer: MEDICARE

## 2023-08-01 VITALS
DIASTOLIC BLOOD PRESSURE: 68 MMHG | WEIGHT: 234 LBS | OXYGEN SATURATION: 99 % | TEMPERATURE: 97.3 F | HEIGHT: 67 IN | SYSTOLIC BLOOD PRESSURE: 111 MMHG | HEART RATE: 77 BPM | BODY MASS INDEX: 36.73 KG/M2 | RESPIRATION RATE: 20 BRPM

## 2023-08-01 DIAGNOSIS — E78.5 HYPERLIPIDEMIA, UNSPECIFIED HYPERLIPIDEMIA TYPE: Chronic | ICD-10-CM

## 2023-08-01 DIAGNOSIS — D64.9 ANEMIA, UNSPECIFIED TYPE: ICD-10-CM

## 2023-08-01 DIAGNOSIS — E11.43 TYPE 2 DIABETES MELLITUS WITH DIABETIC AUTONOMIC NEUROPATHY, WITH LONG-TERM CURRENT USE OF INSULIN (HCC): ICD-10-CM

## 2023-08-01 DIAGNOSIS — I50.22 CHRONIC SYSTOLIC (CONGESTIVE) HEART FAILURE (HCC): ICD-10-CM

## 2023-08-01 DIAGNOSIS — I48.19 PERSISTENT ATRIAL FIBRILLATION (HCC): Primary | Chronic | ICD-10-CM

## 2023-08-01 DIAGNOSIS — M1A.9XX0 CHRONIC GOUT WITHOUT TOPHUS, UNSPECIFIED CAUSE, UNSPECIFIED SITE: ICD-10-CM

## 2023-08-01 DIAGNOSIS — Z79.4 TYPE 2 DIABETES MELLITUS WITH DIABETIC AUTONOMIC NEUROPATHY, WITH LONG-TERM CURRENT USE OF INSULIN (HCC): ICD-10-CM

## 2023-08-01 PROCEDURE — 99212 OFFICE O/P EST SF 10 MIN: CPT | Performed by: STUDENT IN AN ORGANIZED HEALTH CARE EDUCATION/TRAINING PROGRAM

## 2023-08-01 PROCEDURE — 3078F DIAST BP <80 MM HG: CPT | Performed by: STUDENT IN AN ORGANIZED HEALTH CARE EDUCATION/TRAINING PROGRAM

## 2023-08-01 PROCEDURE — 99213 OFFICE O/P EST LOW 20 MIN: CPT | Performed by: STUDENT IN AN ORGANIZED HEALTH CARE EDUCATION/TRAINING PROGRAM

## 2023-08-01 PROCEDURE — 1124F ACP DISCUSS-NO DSCNMKR DOCD: CPT | Performed by: STUDENT IN AN ORGANIZED HEALTH CARE EDUCATION/TRAINING PROGRAM

## 2023-08-01 PROCEDURE — 3046F HEMOGLOBIN A1C LEVEL >9.0%: CPT | Performed by: STUDENT IN AN ORGANIZED HEALTH CARE EDUCATION/TRAINING PROGRAM

## 2023-08-01 PROCEDURE — 3074F SYST BP LT 130 MM HG: CPT | Performed by: STUDENT IN AN ORGANIZED HEALTH CARE EDUCATION/TRAINING PROGRAM

## 2023-08-01 RX ORDER — ALLOPURINOL 100 MG/1
200 TABLET ORAL DAILY
Qty: 180 TABLET | Refills: 1 | Status: SHIPPED | OUTPATIENT
Start: 2023-08-01

## 2023-08-01 RX ORDER — METOPROLOL SUCCINATE 25 MG/1
25 TABLET, EXTENDED RELEASE ORAL DAILY
Qty: 90 TABLET | Refills: 1 | Status: SHIPPED | OUTPATIENT
Start: 2023-08-01

## 2023-08-01 RX ORDER — FERROUS SULFATE 325(65) MG
325 TABLET ORAL EVERY OTHER DAY
Qty: 45 TABLET | Refills: 1 | Status: SHIPPED | OUTPATIENT
Start: 2023-08-01

## 2023-08-01 RX ORDER — ATORVASTATIN CALCIUM 40 MG/1
TABLET, FILM COATED ORAL
Qty: 90 TABLET | Refills: 1 | Status: SHIPPED | OUTPATIENT
Start: 2023-08-01

## 2023-08-01 ASSESSMENT — ENCOUNTER SYMPTOMS
CONSTIPATION: 0
DIARRHEA: 0
SHORTNESS OF BREATH: 0
VOMITING: 0
SORE THROAT: 0
COUGH: 0
ABDOMINAL PAIN: 0
WHEEZING: 0
NAUSEA: 0
ABDOMINAL DISTENTION: 0

## 2023-08-01 NOTE — PROGRESS NOTES
[facility]  Internal Medicine Residency Program  [facility] Note      SUBJECTIVE:  CC: had concerns including Diabetes. HPI:  Betty Whatley is a 77 y.o.male with PMH of HFpEF (65%), HTN, HLD, CKD, IDDM2, Hypothyroidism, Persistent AF, Sinus Node Dysfunction with internal pacemaker in place, Obesity, NIKO, Obesity Hypoventilation Syndrome, Gout, Vitamin D deficiency, Chronic back pain, basal cell carcinoma of scalp s/p excision presenting to [facility] for 3 month follow up.     IDDM2  - Last A1c 9.5% on 5/2023  - On Basaglar 25u BID  - Basaglar with holding parameter to not take it when his BG is below 180  - Prescribed Ozempic 0.25 mg weekly, patient states he did not obtain the medication  - BG logs checked, range 80-180s on fasting morning glucose since basaglar dose was changed     HFpEF  - Bumex 2 mg MWF  - Taking Toprol-XL 25 mg daily  - No ACEI/ARB due to renal function and constant episodes of RADHA, has tried being back on Lisinopril recently but did not tolerate again  - NYHA-3  - Follows with CHF clinic     HTN  - On Toprol-XL 25 mg  - BP today 111/68     HLD  - On Atorvastatin 40 mg  - Last Lipid Panel 11/2022     CKD  - Sees Nephrology, Dr. Eugenie Lewis  - Recent rise in creatinine appears to have been an RADHA related to medication rather than progression of CKD     Obesity Hypoventilation Syndrome  - Was on nightly oxygen at 5L, but states stopped due to nasal dryness, still uses occasionally if feel its needed  - Follows up with Dr. Saranya Finch     Persistent AF  - On Toprol-XL and Eliquis     Sinus Node Dysfunction s/p dual chamber pacemaker since 10/2017  - Follows up with EP  - Denies any symptoms     Hypothyroidism  - On Synthroid 88 mcg  - Last TSH 9.33 on 12/2022, T4 was 1.08     Gout  - On Allopurinol 200 mg daily  - Last uric acid level 7.3 on 5/2023    Hx Anemia  - On Iron supplement daily, switching now to every other day  - Hgb stable, will rechecked CBC with next set of labs     HCM  - Will be

## 2023-08-01 NOTE — PATIENT INSTRUCTIONS
Dear Gracie Rocha,    Thank you for coming to your appointment today. I hope we have addressed all of your needs. Please make sure to do the following:  - Continue your medications as listed. - Get labs drawn before our next follow up. - We will see each other again in 3 months    Call for a sooner appointment if you develop any new or worsening symptoms. Have a great day!     Sincerely,  Kenya Guerra M.D  8/1/2023  10:59 AM

## 2023-08-01 NOTE — PROGRESS NOTES
[facility]  Internal Medicine Residency Clinic    Attending Physician Statement  I have discussed the case, including pertinent history and exam findings with the resident physician. I agree with the assessment, plan and orders as documented by the resident. I have reviewed the relevant PMHx, PSHx, FamHx, SocialHx, medications, and allergies and updated history as appropriate. Patient presents for routine follow up of medical problems. DM 2 uncontrolled A1c 9.5%, improved control by glucose log in office range 80 - 180s  Recommended Ozempic but unable to due to cost -- consider in future visit   Off Januvia due to cost.   Continues basal insulin 25 units BID and glucose monitoring    HFpEF, clinically euvolemic with chronic venous   Continues on toprol, bumex 2 mg m/w/f    PAF with PPM   Continues on [address] and BB, follows EP    Hypothyroidism   Continue TSH following, continue on LT4    Hx KENIA   Move Fe supplement to every other day    Remainder of medical problems as per resident note.     Cora Valladares DO  8/1/2023 10:45 AM

## 2023-08-03 ENCOUNTER — HOSPITAL ENCOUNTER (OUTPATIENT)
Dept: OTHER | Age: 68
Setting detail: THERAPIES SERIES
Discharge: HOME | End: 2023-08-03
Payer: MEDICARE

## 2023-08-03 VITALS
OXYGEN SATURATION: 96 % | SYSTOLIC BLOOD PRESSURE: 120 MMHG | BODY MASS INDEX: 37.43 KG/M2 | WEIGHT: 239 LBS | RESPIRATION RATE: 18 BRPM | DIASTOLIC BLOOD PRESSURE: 56 MMHG | HEART RATE: 78 BPM

## 2023-08-03 LAB
ANION GAP SERPL CALCULATED.3IONS-SCNC: 12 MMOL/L (ref 7–16)
BNP SERPL-MCNC: 1713 PG/ML (ref 0–125)
BUN SERPL-MCNC: 34 MG/DL (ref 6–23)
CALCIUM SERPL-MCNC: 8.9 MG/DL (ref 8.6–10.2)
CHLORIDE SERPL-SCNC: 103 MMOL/L (ref 98–107)
CO2 SERPL-SCNC: 24 MMOL/L (ref 22–29)
CREAT SERPL-MCNC: 1.2 MG/DL (ref 0.7–1.2)
GFR SERPL CREATININE-BSD FRML MDRD: >60 ML/MIN/1.73M2
GLUCOSE SERPL-MCNC: 155 MG/DL (ref 74–99)
POTASSIUM SERPL-SCNC: 4.4 MMOL/L (ref 3.5–5)
SODIUM SERPL-SCNC: 139 MMOL/L (ref 132–146)

## 2023-08-03 PROCEDURE — 80048 BASIC METABOLIC PNL TOTAL CA: CPT

## 2023-08-03 PROCEDURE — 99214 OFFICE O/P EST MOD 30 MIN: CPT

## 2023-08-03 PROCEDURE — 2580000003 HC RX 258: Performed by: STUDENT IN AN ORGANIZED HEALTH CARE EDUCATION/TRAINING PROGRAM

## 2023-08-03 PROCEDURE — 83880 ASSAY OF NATRIURETIC PEPTIDE: CPT

## 2023-08-03 PROCEDURE — 96374 THER/PROPH/DIAG INJ IV PUSH: CPT

## 2023-08-03 PROCEDURE — 2500000003 HC RX 250 WO HCPCS: Performed by: STUDENT IN AN ORGANIZED HEALTH CARE EDUCATION/TRAINING PROGRAM

## 2023-08-03 RX ORDER — SODIUM CHLORIDE 0.9 % (FLUSH) 0.9 %
10 SYRINGE (ML) INJECTION 2 TIMES DAILY
Status: DISCONTINUED | OUTPATIENT
Start: 2023-08-03 | End: 2023-08-04 | Stop reason: HOSPADM

## 2023-08-03 RX ORDER — BUMETANIDE 0.25 MG/ML
2 INJECTION INTRAMUSCULAR; INTRAVENOUS ONCE
Status: COMPLETED | OUTPATIENT
Start: 2023-08-03 | End: 2023-08-03

## 2023-08-03 RX ADMIN — SODIUM CHLORIDE, PRESERVATIVE FREE 10 ML: 5 INJECTION INTRAVENOUS at 08:30

## 2023-08-03 RX ADMIN — BUMETANIDE 2 MG: 0.25 INJECTION INTRAMUSCULAR; INTRAVENOUS at 08:30

## 2023-08-03 NOTE — PLAN OF CARE
Problem: Chronic Conditions and Co-morbidities  Goal: Patient's chronic conditions and co-morbidity symptoms are monitored and maintained or improved  Flowsheets (Taken 8/3/2023 6208)  Care Plan - Patient's Chronic Conditions and Co-Morbidity Symptoms are Monitored and Maintained or Improved: Monitor and assess patient's chronic conditions and comorbid symptoms for stability, deterioration, or improvement

## 2023-08-03 NOTE — PROGRESS NOTES
Congestive Heart Failure [id number] [facility]         Diann Mcdaniels   1955      Referring Provider: [address]  Primary Care Physician: [address]  Cardiologist: NOT ESTABLISHED YET/ EP DR. Jose L Saldana  Nephrologist: DR. Peguero [facility],[address]      History of Present Illness:     Diann Mcdaniels is a 77 y.o. male with a history of HFpEF, most recent EF 65% on 8/22/22. Patient Story:    He does  have dyspnea with exertion, shortness of breath, or decline in overall functional capacity. He does not have orthopnea, but he sleeps in a recliner, PND, nocturnal cough or hemoptysis. He does have abdominal distention or bloating, early satiety, anorexia/change in appetite. He does have a good urinary response to oral diuretic. He does have pitting lower bilateral extremity edema. He denies lightheadedness, dizziness. He denies palpitations, syncope or near syncope. He does not complain of chest pain, pressure, discomfort. No Known Allergies        Prior to Visit Medications    Medication Sig Taking? Authorizing Provider   ferrous sulfate (IRON 325) 325 (65 Fe) MG tablet Take 1 tablet by mouth every other day  Bindu Chamorro MD   atorvastatin (LIPITOR) 40 MG tablet TAKE ONE TABLET BY MOUTH DAILY  Bindu Chamorro MD   metoprolol succinate (TOPROL XL) 25 MG extended release tablet Take 1 tablet by mouth daily  Bindu Chamorro MD   apixaban (ELIQUIS) 5 MG TABS tablet Take 1 tablet by mouth 2 times daily  Bindu Chamorro MD   allopurinol (ZYLOPRIM) 100 MG tablet Take 2 tablets by mouth daily  Bindu Chamorro MD   aspirin 81 MG EC tablet Take 1 tablet by mouth daily  Historical Provider, MD   insulin glargine (BASAGLAR KWIKPEN) 100 UNIT/ML injection pen Inject 25 Units into the skin 2 times daily Do not administer if blood sugar is below 180. PAP medication.   Lindy Garvin,    HYDROcodone-acetaminophen (NORCO) 7.5-325 MG per tablet   Historical Provider, MD

## 2023-08-10 ENCOUNTER — HOSPITAL ENCOUNTER (OUTPATIENT)
Dept: OTHER | Age: 68
Setting detail: THERAPIES SERIES
Discharge: HOME | End: 2023-08-10
Payer: MEDICARE

## 2023-08-10 VITALS
BODY MASS INDEX: 37.12 KG/M2 | WEIGHT: 237 LBS | SYSTOLIC BLOOD PRESSURE: 138 MMHG | DIASTOLIC BLOOD PRESSURE: 64 MMHG | OXYGEN SATURATION: 96 % | HEART RATE: 75 BPM | RESPIRATION RATE: 18 BRPM

## 2023-08-10 LAB
ANION GAP SERPL CALCULATED.3IONS-SCNC: 11 MMOL/L (ref 7–16)
BNP SERPL-MCNC: 1911 PG/ML (ref 0–125)
BUN SERPL-MCNC: 35 MG/DL (ref 6–23)
CALCIUM SERPL-MCNC: 8.9 MG/DL (ref 8.6–10.2)
CHLORIDE SERPL-SCNC: 102 MMOL/L (ref 98–107)
CO2 SERPL-SCNC: 28 MMOL/L (ref 22–29)
CREAT SERPL-MCNC: 1.5 MG/DL (ref 0.7–1.2)
GFR SERPL CREATININE-BSD FRML MDRD: 51 ML/MIN/1.73M2
GLUCOSE SERPL-MCNC: 147 MG/DL (ref 74–99)
POTASSIUM SERPL-SCNC: 4 MMOL/L (ref 3.5–5)
SODIUM SERPL-SCNC: 141 MMOL/L (ref 132–146)

## 2023-08-10 PROCEDURE — 96374 THER/PROPH/DIAG INJ IV PUSH: CPT

## 2023-08-10 PROCEDURE — 2580000003 HC RX 258: Performed by: STUDENT IN AN ORGANIZED HEALTH CARE EDUCATION/TRAINING PROGRAM

## 2023-08-10 PROCEDURE — 83880 ASSAY OF NATRIURETIC PEPTIDE: CPT

## 2023-08-10 PROCEDURE — 99214 OFFICE O/P EST MOD 30 MIN: CPT

## 2023-08-10 PROCEDURE — 2500000003 HC RX 250 WO HCPCS: Performed by: STUDENT IN AN ORGANIZED HEALTH CARE EDUCATION/TRAINING PROGRAM

## 2023-08-10 PROCEDURE — 80048 BASIC METABOLIC PNL TOTAL CA: CPT

## 2023-08-10 RX ORDER — BUMETANIDE 0.25 MG/ML
2 INJECTION INTRAMUSCULAR; INTRAVENOUS ONCE
Status: COMPLETED | OUTPATIENT
Start: 2023-08-10 | End: 2023-08-10

## 2023-08-10 RX ORDER — SODIUM CHLORIDE 0.9 % (FLUSH) 0.9 %
5-40 SYRINGE (ML) INJECTION ONCE
Status: COMPLETED | OUTPATIENT
Start: 2023-08-10 | End: 2023-08-10

## 2023-08-10 RX ADMIN — BUMETANIDE 2 MG: 0.25 INJECTION INTRAMUSCULAR; INTRAVENOUS at 08:09

## 2023-08-10 RX ADMIN — SODIUM CHLORIDE, PRESERVATIVE FREE 10 ML: 5 INJECTION INTRAVENOUS at 08:09

## 2023-08-10 NOTE — PLAN OF CARE
Problem: Chronic Conditions and Co-morbidities  Goal: Patient's chronic conditions and co-morbidity symptoms are monitored and maintained or improved  Flowsheets (Taken 8/10/2023 0621)  Care Plan - Patient's Chronic Conditions and Co-Morbidity Symptoms are Monitored and Maintained or Improved: Monitor and assess patient's chronic conditions and comorbid symptoms for stability, deterioration, or improvement

## 2023-08-10 NOTE — PROGRESS NOTES
Congestive Heart Failure [id number] [facility]         Hung Chandler   1955      Referring Provider: [address]  Primary Care Physician: [address]  Cardiologist: NOT ESTABLISHED YET/ EP DR. Devi Schumacher  Nephrologist: DR. Peguero [facility],[address]      History of Present Illness:     Hung Chandler is a 77 y.o. male with a history of HFpEF, most recent EF 65% on 8/22/22. Patient Story:    He does  have dyspnea with exertion, shortness of breath, or decline in overall functional capacity. He does not have orthopnea, but he sleeps in a recliner, PND, nocturnal cough or hemoptysis. He does have abdominal distention or bloating, early satiety, anorexia/change in appetite. He does have a good urinary response to oral diuretic. He does have pitting lower bilateral extremity edema. He denies lightheadedness, dizziness. He denies palpitations, syncope or near syncope. He does not complain of chest pain, pressure, discomfort. No Known Allergies        Prior to Visit Medications    Medication Sig Taking? Authorizing Provider   ferrous sulfate (IRON 325) 325 (65 Fe) MG tablet Take 1 tablet by mouth every other day  Urbano Soulier, MD   atorvastatin (LIPITOR) 40 MG tablet TAKE ONE TABLET BY MOUTH DAILY  Urbano Soulier, MD   metoprolol succinate (TOPROL XL) 25 MG extended release tablet Take 1 tablet by mouth daily  Urbano Soulier, MD   apixaban (ELIQUIS) 5 MG TABS tablet Take 1 tablet by mouth 2 times daily  Urbano Soulier, MD   allopurinol (ZYLOPRIM) 100 MG tablet Take 2 tablets by mouth daily  Urbano Soulier, MD   aspirin 81 MG EC tablet Take 1 tablet by mouth daily  Historical Provider, MD   insulin glargine (BASAGLAR KWIKPEN) 100 UNIT/ML injection pen Inject 25 Units into the skin 2 times daily Do not administer if blood sugar is below 180. PAP medication.   Lindy Garvin,    HYDROcodone-acetaminophen (NORCO) 7.5-325 MG per tablet   Historical Provider, MD

## 2023-08-17 ENCOUNTER — HOSPITAL ENCOUNTER (OUTPATIENT)
Dept: OTHER | Age: 68
Setting detail: THERAPIES SERIES
Discharge: HOME | End: 2023-08-17
Payer: MEDICARE

## 2023-08-17 VITALS
SYSTOLIC BLOOD PRESSURE: 138 MMHG | DIASTOLIC BLOOD PRESSURE: 64 MMHG | OXYGEN SATURATION: 94 % | HEART RATE: 70 BPM | WEIGHT: 241 LBS | BODY MASS INDEX: 37.75 KG/M2 | RESPIRATION RATE: 18 BRPM

## 2023-08-17 LAB
ANION GAP SERPL CALCULATED.3IONS-SCNC: 13 MMOL/L (ref 7–16)
BNP SERPL-MCNC: 1679 PG/ML (ref 0–125)
BUN SERPL-MCNC: 28 MG/DL (ref 6–23)
CALCIUM SERPL-MCNC: 8.9 MG/DL (ref 8.6–10.2)
CHLORIDE SERPL-SCNC: 99 MMOL/L (ref 98–107)
CO2 SERPL-SCNC: 25 MMOL/L (ref 22–29)
CREAT SERPL-MCNC: 1.2 MG/DL (ref 0.7–1.2)
GFR SERPL CREATININE-BSD FRML MDRD: >60 ML/MIN/1.73M2
GLUCOSE SERPL-MCNC: 133 MG/DL (ref 74–99)
POTASSIUM SERPL-SCNC: 4.1 MMOL/L (ref 3.5–5)
SODIUM SERPL-SCNC: 137 MMOL/L (ref 132–146)

## 2023-08-17 PROCEDURE — 96374 THER/PROPH/DIAG INJ IV PUSH: CPT

## 2023-08-17 PROCEDURE — 83880 ASSAY OF NATRIURETIC PEPTIDE: CPT

## 2023-08-17 PROCEDURE — 2580000003 HC RX 258: Performed by: STUDENT IN AN ORGANIZED HEALTH CARE EDUCATION/TRAINING PROGRAM

## 2023-08-17 PROCEDURE — 2500000003 HC RX 250 WO HCPCS: Performed by: STUDENT IN AN ORGANIZED HEALTH CARE EDUCATION/TRAINING PROGRAM

## 2023-08-17 PROCEDURE — 99214 OFFICE O/P EST MOD 30 MIN: CPT

## 2023-08-17 PROCEDURE — 80048 BASIC METABOLIC PNL TOTAL CA: CPT

## 2023-08-17 RX ORDER — BUMETANIDE 0.25 MG/ML
2 INJECTION INTRAMUSCULAR; INTRAVENOUS ONCE
Status: COMPLETED | OUTPATIENT
Start: 2023-08-17 | End: 2023-08-17

## 2023-08-17 RX ORDER — SODIUM CHLORIDE 0.9 % (FLUSH) 0.9 %
10 SYRINGE (ML) INJECTION PRN
Status: DISCONTINUED | OUTPATIENT
Start: 2023-08-17 | End: 2023-08-18 | Stop reason: HOSPADM

## 2023-08-17 RX ADMIN — BUMETANIDE 2 MG: 0.25 INJECTION INTRAMUSCULAR; INTRAVENOUS at 08:44

## 2023-08-17 RX ADMIN — SODIUM CHLORIDE, PRESERVATIVE FREE 10 ML: 5 INJECTION INTRAVENOUS at 08:45

## 2023-08-17 NOTE — PLAN OF CARE
Problem: Chronic Conditions and Co-morbidities  Goal: Patient's chronic conditions and co-morbidity symptoms are monitored and maintained or improved  Flowsheets (Taken 8/17/2023 6859)  Care Plan - Patient's Chronic Conditions and Co-Morbidity Symptoms are Monitored and Maintained or Improved: Monitor and assess patient's chronic conditions and comorbid symptoms for stability, deterioration, or improvement

## 2023-08-17 NOTE — PROGRESS NOTES
Discussed patient's visit today with Dr Alem Patel. Order given to have patient take a dose of his Bumex on Saturday 8-19-23 and Sunday 8-20-23, then to resume his usual schedule of Monday, Wednesday and Friday only. He is to follow up next week on Friday with a BMP/BNP draw. Above orders given to wife Petra. She demonstrates understanding.

## 2023-08-17 NOTE — PROGRESS NOTES
Congestive Heart Failure [id number] [facility]         Rob Rodney   1955      Referring Provider: [address]  Primary Care Physician: [address]  Cardiologist: NOT ESTABLISHED YET/ EP DR. Mary Peres  Nephrologist: DR. Peguero [facility],[address]      History of Present Illness:     Rob Rodney is a 77 y.o. male with a history of HFpEF, most recent EF 65% on 8/22/22. Patient Story:    He does  have dyspnea with exertion, shortness of breath, or decline in overall functional capacity. He does not have orthopnea, but he sleeps in a recliner, PND, nocturnal cough or hemoptysis. He does have abdominal distention or bloating, early satiety, anorexia/change in appetite. He does have a good urinary response to oral diuretic. He does have pitting lower bilateral extremity edema. He denies lightheadedness, dizziness. He denies palpitations, syncope or near syncope. He does not complain of chest pain, pressure, discomfort. No Known Allergies        Prior to Visit Medications    Medication Sig Taking? Authorizing Provider   ferrous sulfate (IRON 325) 325 (65 Fe) MG tablet Take 1 tablet by mouth every other day  Ion Duvall MD   atorvastatin (LIPITOR) 40 MG tablet TAKE ONE TABLET BY MOUTH DAILY  Ion Duvall MD   metoprolol succinate (TOPROL XL) 25 MG extended release tablet Take 1 tablet by mouth daily  Ion Duvall MD   apixaban (ELIQUIS) 5 MG TABS tablet Take 1 tablet by mouth 2 times daily  Ion Duvall MD   allopurinol (ZYLOPRIM) 100 MG tablet Take 2 tablets by mouth daily  Ion Duvall MD   aspirin 81 MG EC tablet Take 1 tablet by mouth daily  Historical Provider, MD   insulin glargine (BASAGLAR KWIKPEN) 100 UNIT/ML injection pen Inject 25 Units into the skin 2 times daily Do not administer if blood sugar is below 180. PAP medication.   Lindy Garvin,    HYDROcodone-acetaminophen (NORCO) 7.5-325 MG per tablet   Historical Provider, MD

## 2023-08-25 ENCOUNTER — HOSPITAL ENCOUNTER (OUTPATIENT)
Dept: OTHER | Age: 68
Setting detail: THERAPIES SERIES
Discharge: HOME | End: 2023-08-25
Payer: MEDICARE

## 2023-08-25 VITALS
DIASTOLIC BLOOD PRESSURE: 63 MMHG | OXYGEN SATURATION: 95 % | SYSTOLIC BLOOD PRESSURE: 136 MMHG | WEIGHT: 234 LBS | HEART RATE: 81 BPM | BODY MASS INDEX: 36.65 KG/M2 | RESPIRATION RATE: 18 BRPM

## 2023-08-25 LAB
ANION GAP SERPL CALCULATED.3IONS-SCNC: 13 MMOL/L (ref 7–16)
BNP SERPL-MCNC: 1598 PG/ML (ref 0–125)
BUN SERPL-MCNC: 28 MG/DL (ref 6–23)
CALCIUM SERPL-MCNC: 8.9 MG/DL (ref 8.6–10.2)
CHLORIDE SERPL-SCNC: 99 MMOL/L (ref 98–107)
CO2 SERPL-SCNC: 23 MMOL/L (ref 22–29)
CREAT SERPL-MCNC: 1.3 MG/DL (ref 0.7–1.2)
GFR SERPL CREATININE-BSD FRML MDRD: 60 ML/MIN/1.73M2
GLUCOSE SERPL-MCNC: 122 MG/DL (ref 74–99)
POTASSIUM SERPL-SCNC: 3.7 MMOL/L (ref 3.5–5)
SODIUM SERPL-SCNC: 135 MMOL/L (ref 132–146)

## 2023-08-25 PROCEDURE — 83880 ASSAY OF NATRIURETIC PEPTIDE: CPT

## 2023-08-25 PROCEDURE — 2500000003 HC RX 250 WO HCPCS: Performed by: STUDENT IN AN ORGANIZED HEALTH CARE EDUCATION/TRAINING PROGRAM

## 2023-08-25 PROCEDURE — 96374 THER/PROPH/DIAG INJ IV PUSH: CPT

## 2023-08-25 PROCEDURE — 2580000003 HC RX 258: Performed by: STUDENT IN AN ORGANIZED HEALTH CARE EDUCATION/TRAINING PROGRAM

## 2023-08-25 PROCEDURE — 99214 OFFICE O/P EST MOD 30 MIN: CPT

## 2023-08-25 PROCEDURE — 80048 BASIC METABOLIC PNL TOTAL CA: CPT

## 2023-08-25 RX ORDER — SODIUM CHLORIDE 0.9 % (FLUSH) 0.9 %
10 SYRINGE (ML) INJECTION PRN
Status: DISCONTINUED | OUTPATIENT
Start: 2023-08-25 | End: 2023-08-26 | Stop reason: HOSPADM

## 2023-08-25 RX ORDER — BUMETANIDE 0.25 MG/ML
2 INJECTION INTRAMUSCULAR; INTRAVENOUS ONCE
Status: COMPLETED | OUTPATIENT
Start: 2023-08-25 | End: 2023-08-25

## 2023-08-25 RX ADMIN — SODIUM CHLORIDE, PRESERVATIVE FREE 10 ML: 5 INJECTION INTRAVENOUS at 08:25

## 2023-08-25 RX ADMIN — BUMETANIDE 2 MG: 0.25 INJECTION INTRAMUSCULAR; INTRAVENOUS at 08:25

## 2023-08-25 NOTE — PROGRESS NOTES
Congestive Heart Failure [id number] [facility]         Diann Mcdaniels   1955      Referring Provider: [address]  Primary Care Physician: [address]  Cardiologist: NOT ESTABLISHED YET/ EP DR. Jose L Saldana  Nephrologist: DR. Peguero [facility],[address]      History of Present Illness:     Diann Mcdaniels is a 77 y.o. male with a history of HFpEF, most recent EF 65% on 8/22/22. Patient Story:    He does have dyspnea with exertion, shortness of breath, or decline in overall functional capacity. He does not have orthopnea, but he sleeps in a recliner, PND, nocturnal cough or hemoptysis. He does have abdominal distention or bloating, early satiety, anorexia/change in appetite. He does have a good urinary response to oral diuretic. He does have pitting lower bilateral extremity edema. He denies lightheadedness, dizziness. He denies palpitations, syncope or near syncope. He does not complain of chest pain, pressure, discomfort. No Known Allergies        Prior to Visit Medications    Medication Sig Taking? Authorizing Provider   ferrous sulfate (IRON 325) 325 (65 Fe) MG tablet Take 1 tablet by mouth every other day  Bindu Chamorro MD   atorvastatin (LIPITOR) 40 MG tablet TAKE ONE TABLET BY MOUTH DAILY  Bindu Chamorro MD   metoprolol succinate (TOPROL XL) 25 MG extended release tablet Take 1 tablet by mouth daily  Bindu Chamorro MD   apixaban (ELIQUIS) 5 MG TABS tablet Take 1 tablet by mouth 2 times daily  Bindu Chamorro MD   allopurinol (ZYLOPRIM) 100 MG tablet Take 2 tablets by mouth daily  Bindu Chamorro MD   aspirin 81 MG EC tablet Take 1 tablet by mouth daily  Historical Provider, MD   insulin glargine (BASAGLAR KWIKPEN) 100 UNIT/ML injection pen Inject 25 Units into the skin 2 times daily Do not administer if blood sugar is below 180. PAP medication.   Lindy Garvin,    HYDROcodone-acetaminophen (NORCO) 7.5-325 MG per tablet   Historical Provider, MD

## 2023-08-25 NOTE — PLAN OF CARE
Problem: Chronic Conditions and Co-morbidities  Goal: Patient's chronic conditions and co-morbidity symptoms are monitored and maintained or improved  Flowsheets (Taken 8/25/2023 4361)  Care Plan - Patient's Chronic Conditions and Co-Morbidity Symptoms are Monitored and Maintained or Improved: Monitor and assess patient's chronic conditions and comorbid symptoms for stability, deterioration, or improvement

## 2023-09-08 ENCOUNTER — HOSPITAL ENCOUNTER (OUTPATIENT)
Dept: OTHER | Age: 68
Setting detail: THERAPIES SERIES
Discharge: HOME | End: 2023-09-08
Payer: MEDICARE

## 2023-09-08 VITALS
RESPIRATION RATE: 18 BRPM | OXYGEN SATURATION: 94 % | SYSTOLIC BLOOD PRESSURE: 133 MMHG | WEIGHT: 239 LBS | DIASTOLIC BLOOD PRESSURE: 63 MMHG | BODY MASS INDEX: 37.43 KG/M2 | HEART RATE: 73 BPM

## 2023-09-08 LAB
ANION GAP SERPL CALCULATED.3IONS-SCNC: 10 MMOL/L (ref 7–16)
BNP SERPL-MCNC: 1808 PG/ML (ref 0–125)
BUN SERPL-MCNC: 25 MG/DL (ref 6–23)
CALCIUM SERPL-MCNC: 8.7 MG/DL (ref 8.6–10.2)
CHLORIDE SERPL-SCNC: 101 MMOL/L (ref 98–107)
CO2 SERPL-SCNC: 23 MMOL/L (ref 22–29)
CREAT SERPL-MCNC: 1.2 MG/DL (ref 0.7–1.2)
GFR SERPL CREATININE-BSD FRML MDRD: >60 ML/MIN/1.73M2
GLUCOSE SERPL-MCNC: 123 MG/DL (ref 74–99)
POTASSIUM SERPL-SCNC: 3.9 MMOL/L (ref 3.5–5)
SODIUM SERPL-SCNC: 134 MMOL/L (ref 132–146)

## 2023-09-08 PROCEDURE — 99214 OFFICE O/P EST MOD 30 MIN: CPT

## 2023-09-08 PROCEDURE — 83880 ASSAY OF NATRIURETIC PEPTIDE: CPT

## 2023-09-08 PROCEDURE — 6360000002 HC RX W HCPCS: Performed by: STUDENT IN AN ORGANIZED HEALTH CARE EDUCATION/TRAINING PROGRAM

## 2023-09-08 PROCEDURE — 96374 THER/PROPH/DIAG INJ IV PUSH: CPT

## 2023-09-08 PROCEDURE — 2580000003 HC RX 258: Performed by: STUDENT IN AN ORGANIZED HEALTH CARE EDUCATION/TRAINING PROGRAM

## 2023-09-08 PROCEDURE — 80048 BASIC METABOLIC PNL TOTAL CA: CPT

## 2023-09-08 RX ORDER — SODIUM CHLORIDE 0.9 % (FLUSH) 0.9 %
10 SYRINGE (ML) INJECTION PRN
Status: DISCONTINUED | OUTPATIENT
Start: 2023-09-08 | End: 2023-09-09 | Stop reason: HOSPADM

## 2023-09-08 RX ORDER — FUROSEMIDE 10 MG/ML
40 INJECTION INTRAMUSCULAR; INTRAVENOUS ONCE
Status: COMPLETED | OUTPATIENT
Start: 2023-09-08 | End: 2023-09-08

## 2023-09-08 RX ADMIN — SODIUM CHLORIDE, PRESERVATIVE FREE 10 ML: 5 INJECTION INTRAVENOUS at 10:25

## 2023-09-08 RX ADMIN — FUROSEMIDE 40 MG: 10 INJECTION, SOLUTION INTRAMUSCULAR; INTRAVENOUS at 10:24

## 2023-09-08 NOTE — PLAN OF CARE
Problem: Chronic Conditions and Co-morbidities  Goal: Patient's chronic conditions and co-morbidity symptoms are monitored and maintained or improved  Flowsheets (Taken 9/8/2023 0997)  Care Plan - Patient's Chronic Conditions and Co-Morbidity Symptoms are Monitored and Maintained or Improved: Monitor and assess patient's chronic conditions and comorbid symptoms for stability, deterioration, or improvement

## 2023-09-08 NOTE — PROGRESS NOTES
9/8/23- 1415- I called and left a voice message with pt re: if Lasix IVP had a good response? HEATHER Cavazos RN
DIRECTED MEDICAL THERAPY for HFpEF:  SGLT2i:  (2a indication)  No  Aldosterone antagonist: (2b indication)  No  ARNI/ACE I/ARB: (2b indication, with LVEF on lower end of spectrum)  No  (On Toprol)    RISK SCORES:    [] Palliative care consulted    Predictive Model Details          18%  Factor Value    End of Life Care Index Model 19% Age 78 y/o     13% Last Albumin Janie 3.4 g/dL     13% Last RDW Janie 16.5 fL     10% Has Chronic Ulcer of Skin Yes     10% Legal Sex Male     8% Has Nonhypertensive Congestive Heart Failure Yes     7% Prescribed Narcotic Analgesic Yes     5% Has COPD or Bronchiectasis Yes     5% Has Medical Device Yes     5% Has Fluid or Electrolyte Disorder Yes     3% Has Cardiac Conduction Disorder Yes     2% Prescribed Antidiabetic Yes     2% Prescribed Diuretic Yes             HEART FAILURE FOCUSED PHYSICAL EXAMINATION:    Vitals:    09/08/23 1000   BP: 133/63   Pulse: 73   Resp: 18   SpO2: 94%        Assessment  Charting Type: Shift assessment        HEENT (Head, Ears, Eyes, Nose, & Throat)  HEENT (WDL): Within Defined Limits  Respiratory  Respiratory Pattern: Regular  Respiratory Depth: Normal  Respiratory Quality/Effort: Dyspnea with exertion  Chest Assessment: Chest expansion symmetrical  L Breath Sounds: Diminished, Fine Crackles  R Breath Sounds: Diminished, Fine Crackles  Breath Sounds  Right Upper Lobe: Clear  Right Middle Lobe: Clear  Right Lower Lobe: Diminished, Fine crackles  Left Upper Lobe: Clear  Left Lower Lobe: Diminished, Fine crackles     Cardiac  Cardiac Rhythm: Ventricular paced  Rhythm Interpretation  Pulse: 73     Gastrointestinal  Abdominal (WDL): Exceptions to WDL  Abdomen Inspection: Soft, Rounded, Distended  RUQ Bowel Sounds: Active  LUQ Bowel Sounds: Active  RLQ Bowel Sounds: Active  LLQ Bowel Sounds: Active      Bowel Sounds  RUQ Bowel Sounds: Active  LUQ Bowel Sounds: Active  RLQ Bowel Sounds: Active  LLQ Bowel Sounds:  Active  Peripheral Vascular  Peripheral Vascular

## 2023-09-12 ENCOUNTER — HOSPITAL ENCOUNTER (OUTPATIENT)
Dept: OTHER | Age: 68
Setting detail: THERAPIES SERIES
Discharge: HOME | End: 2023-09-12
Payer: MEDICARE

## 2023-09-12 VITALS
HEART RATE: 67 BPM | BODY MASS INDEX: 36.34 KG/M2 | RESPIRATION RATE: 18 BRPM | SYSTOLIC BLOOD PRESSURE: 129 MMHG | WEIGHT: 232 LBS | OXYGEN SATURATION: 93 % | DIASTOLIC BLOOD PRESSURE: 70 MMHG

## 2023-09-12 LAB
ANION GAP SERPL CALCULATED.3IONS-SCNC: 11 MMOL/L (ref 7–16)
BNP SERPL-MCNC: 2110 PG/ML (ref 0–125)
BUN SERPL-MCNC: 25 MG/DL (ref 6–23)
CALCIUM SERPL-MCNC: 9.2 MG/DL (ref 8.6–10.2)
CHLORIDE SERPL-SCNC: 101 MMOL/L (ref 98–107)
CO2 SERPL-SCNC: 27 MMOL/L (ref 22–29)
CREAT SERPL-MCNC: 1.3 MG/DL (ref 0.7–1.2)
GFR SERPL CREATININE-BSD FRML MDRD: 57 ML/MIN/1.73M2
GLUCOSE SERPL-MCNC: 116 MG/DL (ref 74–99)
POTASSIUM SERPL-SCNC: 3.9 MMOL/L (ref 3.5–5)
SODIUM SERPL-SCNC: 139 MMOL/L (ref 132–146)

## 2023-09-12 PROCEDURE — 80048 BASIC METABOLIC PNL TOTAL CA: CPT

## 2023-09-12 PROCEDURE — 83880 ASSAY OF NATRIURETIC PEPTIDE: CPT

## 2023-09-12 PROCEDURE — 96374 THER/PROPH/DIAG INJ IV PUSH: CPT

## 2023-09-12 PROCEDURE — 6360000002 HC RX W HCPCS: Performed by: STUDENT IN AN ORGANIZED HEALTH CARE EDUCATION/TRAINING PROGRAM

## 2023-09-12 PROCEDURE — 99214 OFFICE O/P EST MOD 30 MIN: CPT

## 2023-09-12 PROCEDURE — 2580000003 HC RX 258: Performed by: STUDENT IN AN ORGANIZED HEALTH CARE EDUCATION/TRAINING PROGRAM

## 2023-09-12 RX ORDER — SODIUM CHLORIDE 0.9 % (FLUSH) 0.9 %
10 SYRINGE (ML) INJECTION 2 TIMES DAILY
Status: DISCONTINUED | OUTPATIENT
Start: 2023-09-12 | End: 2023-09-13 | Stop reason: HOSPADM

## 2023-09-12 RX ORDER — FUROSEMIDE 10 MG/ML
40 INJECTION INTRAMUSCULAR; INTRAVENOUS ONCE
Status: COMPLETED | OUTPATIENT
Start: 2023-09-12 | End: 2023-09-12

## 2023-09-12 RX ADMIN — SODIUM CHLORIDE, PRESERVATIVE FREE 10 ML: 5 INJECTION INTRAVENOUS at 07:15

## 2023-09-12 RX ADMIN — FUROSEMIDE 40 MG: 10 INJECTION, SOLUTION INTRAMUSCULAR; INTRAVENOUS at 07:14

## 2023-09-12 NOTE — PLAN OF CARE
Problem: Chronic Conditions and Co-morbidities  Goal: Patient's chronic conditions and co-morbidity symptoms are monitored and maintained or improved  Flowsheets (Taken 9/12/2023 300)  Care Plan - Patient's Chronic Conditions and Co-Morbidity Symptoms are Monitored and Maintained or Improved: Monitor and assess patient's chronic conditions and comorbid symptoms for stability, deterioration, or improvement

## 2023-09-12 NOTE — PROGRESS NOTES
Vascular  Peripheral Vascular (WDL): Exceptions to WDL  Edema: Left lower extremity, Right lower extremity  RLE Edema: Pitting, +2  LLE Edema: Pitting, +2        Musculoskeletal  RL Extremity: Swelling  LL Extremity: Swelling     Psychosocial  Psychosocial (WDL): Within Defined Limits              Pulse: 67                 LAB DATA:  BMP:  Sodium (mmol/L)   Date Value   09/08/2023 134   08/25/2023 135   08/17/2023 137     Potassium (mmol/L)   Date Value   09/08/2023 3.9   08/25/2023 3.7   08/17/2023 4.1     Potassium reflex Magnesium (mmol/L)   Date Value   01/08/2023 5.1 (H)   01/08/2023 5.6 (H)   12/25/2022 6.5 (H)     Chloride (mmol/L)   Date Value   09/08/2023 101   08/25/2023 99   08/17/2023 99     CO2 (mmol/L)   Date Value   09/08/2023 23   08/25/2023 23   08/17/2023 25     BUN (mg/dL)   Date Value   09/08/2023 25 (H)   08/25/2023 28 (H)   08/17/2023 28 (H)     Creatinine (mg/dL)   Date Value   09/08/2023 1.2   08/25/2023 1.3 (H)   08/17/2023 1.2     Glucose (mg/dL)   Date Value   09/08/2023 123 (H)   08/25/2023 122 (H)   08/17/2023 133 (H)   04/18/2012 133 (H)   10/25/2011 138 (H)   01/28/2011 162 (H)     Calcium (mg/dL)   Date Value   09/08/2023 8.7   08/25/2023 8.9   08/17/2023 8.9     BNP:  Pro-BNP (pg/mL)   Date Value   09/08/2023 1,808 (H)   08/25/2023 1,598 (H)   08/17/2023 1,679 (H)      CBC:  WBC (E9/L)   Date Value   01/11/2023 12.3 (H)     Hemoglobin (g/dL)   Date Value   01/11/2023 11.3 (L)     Hematocrit (%)   Date Value   01/11/2023 36.0 (L)     Platelets (P2/W)   Date Value   01/11/2023 380     Iron Studies:  Ferritin (ng/mL)   Date Value   12/26/2022 125     Iron (mcg/dL)   Date Value   12/26/2022 38 (L)     TIBC (mcg/dL)   Date Value   12/26/2022 301     Hepatic:  AST (U/L)   Date Value   01/08/2023 16     ALT (U/L)   Date Value   01/08/2023 29     Total Bilirubin (mg/dL)   Date Value   01/08/2023 0.4     Alkaline Phosphatase (U/L)   Date Value   01/08/2023 114     INR:  INR (no units)   Date

## 2023-09-22 ENCOUNTER — HOSPITAL ENCOUNTER (OUTPATIENT)
Dept: OTHER | Age: 68
Setting detail: THERAPIES SERIES
Discharge: HOME | End: 2023-09-22
Payer: MEDICARE

## 2023-09-22 VITALS
RESPIRATION RATE: 18 BRPM | SYSTOLIC BLOOD PRESSURE: 135 MMHG | WEIGHT: 224 LBS | BODY MASS INDEX: 35.08 KG/M2 | OXYGEN SATURATION: 94 % | DIASTOLIC BLOOD PRESSURE: 64 MMHG | HEART RATE: 72 BPM

## 2023-09-22 LAB
ANION GAP SERPL CALCULATED.3IONS-SCNC: 15 MMOL/L (ref 7–16)
BNP SERPL-MCNC: 1579 PG/ML (ref 0–125)
BUN SERPL-MCNC: 31 MG/DL (ref 6–23)
CALCIUM SERPL-MCNC: 9.4 MG/DL (ref 8.6–10.2)
CHLORIDE SERPL-SCNC: 96 MMOL/L (ref 98–107)
CO2 SERPL-SCNC: 24 MMOL/L (ref 22–29)
CREAT SERPL-MCNC: 1.2 MG/DL (ref 0.7–1.2)
GFR SERPL CREATININE-BSD FRML MDRD: >60 ML/MIN/1.73M2
GLUCOSE SERPL-MCNC: 206 MG/DL (ref 74–99)
POTASSIUM SERPL-SCNC: 3.9 MMOL/L (ref 3.5–5)
SODIUM SERPL-SCNC: 135 MMOL/L (ref 132–146)

## 2023-09-22 PROCEDURE — 96374 THER/PROPH/DIAG INJ IV PUSH: CPT

## 2023-09-22 PROCEDURE — 2580000003 HC RX 258: Performed by: STUDENT IN AN ORGANIZED HEALTH CARE EDUCATION/TRAINING PROGRAM

## 2023-09-22 PROCEDURE — 99214 OFFICE O/P EST MOD 30 MIN: CPT

## 2023-09-22 PROCEDURE — 6360000002 HC RX W HCPCS: Performed by: STUDENT IN AN ORGANIZED HEALTH CARE EDUCATION/TRAINING PROGRAM

## 2023-09-22 PROCEDURE — 83880 ASSAY OF NATRIURETIC PEPTIDE: CPT

## 2023-09-22 PROCEDURE — 80048 BASIC METABOLIC PNL TOTAL CA: CPT

## 2023-09-22 RX ORDER — SODIUM CHLORIDE 0.9 % (FLUSH) 0.9 %
5-40 SYRINGE (ML) INJECTION ONCE
Status: COMPLETED | OUTPATIENT
Start: 2023-09-22 | End: 2023-09-22

## 2023-09-22 RX ORDER — FUROSEMIDE 10 MG/ML
40 INJECTION INTRAMUSCULAR; INTRAVENOUS ONCE
Status: COMPLETED | OUTPATIENT
Start: 2023-09-22 | End: 2023-09-22

## 2023-09-22 RX ADMIN — SODIUM CHLORIDE, PRESERVATIVE FREE 10 ML: 5 INJECTION INTRAVENOUS at 10:27

## 2023-09-22 RX ADMIN — FUROSEMIDE 40 MG: 10 INJECTION, SOLUTION INTRAMUSCULAR; INTRAVENOUS at 10:26

## 2023-09-22 ASSESSMENT — PATIENT HEALTH QUESTIONNAIRE - PHQ9
1. LITTLE INTEREST OR PLEASURE IN DOING THINGS: NOT AT ALL
2. FEELING DOWN, DEPRESSED OR HOPELESS: NOT AT ALL
SUM OF ALL RESPONSES TO PHQ9 QUESTIONS 1 & 2: 0

## 2023-09-22 NOTE — PLAN OF CARE
Problem: Chronic Conditions and Co-morbidities  Goal: Patient's chronic conditions and co-morbidity symptoms are monitored and maintained or improved  Flowsheets (Taken 9/22/2023 2317)  Care Plan - Patient's Chronic Conditions and Co-Morbidity Symptoms are Monitored and Maintained or Improved: Monitor and assess patient's chronic conditions and comorbid symptoms for stability, deterioration, or improvement

## 2023-09-22 NOTE — PROGRESS NOTES
Congestive Heart Failure [id number] [facility]       Referring Provider: -    Primary Care Physician: Rios López MD   Cardiologist: -  Nephrologist: Dr. Ugo Lawson:     Jayro De La Rosa is a 77 y.o. (1955) male with a history of HFpEF, most recent EF:  Lab Results   Component Value Date    LVEF 65 08/23/2022     Pt's weight DOWN  7lb from his last visit on 9/8/23. He states he has been eating suhail sodium foods. Pt called for same day appt due to increase shortness of breath. Re instructed pt on low sodium diet, pt verbalized understanding. He presents to the CHF clinic for ongoing evaluation and monitoring of heart failure.     In the CHF clinic today he denies any adverse symptoms except:  [] Dizziness or lightheadedness   [] Syncope or near syncope  [] Recent Fall  [] Shortness of breath at rest   [x] Dyspnea with exertion  [] Decline in functional capacity (unable to perform activities they had previously been able to do)  [] Fatigue   [] Orthopnea  [] PND  [] Nocturnal cough  [] Hemoptysis  [] Chest pain, pressure, or discomfort  [] Palpitations  [x] Abdominal distention  [] Abdominal bloating  [] Early satiety  [] Blood in stool   [] Diarrhea  [] Constipation  [] Nausea/Vomiting  [] Decreased urinary response to oral diuretic   [] Scrotal swelling   [x] Lower extremity edema  [] Used PRN doses of oral diuretic   [] Weight gain    Wt Readings from Last 3 Encounters:   09/22/23 224 lb (101.6 kg)   09/12/23 232 lb (105.2 kg)   09/08/23 239 lb (108.4 kg)           SOCIAL HISTORY:  [x] Denies tobacco, alcohol or illicit drug abuse  [] Tobacco use:  [] ETOH use:  [] Illicit drug use:        MEDICATIONS:    No Known Allergies    Current Outpatient Medications:     ferrous sulfate (IRON 325) 325 (65 Fe) MG tablet, Take 1 tablet by mouth every other day, Disp: 45 tablet, Rfl: 1    atorvastatin (LIPITOR) 40 MG tablet, TAKE ONE TABLET BY MOUTH DAILY,

## 2023-10-06 ENCOUNTER — TELEPHONE (OUTPATIENT)
Dept: INTERNAL MEDICINE | Age: 68
End: 2023-10-06

## 2023-10-06 NOTE — TELEPHONE ENCOUNTER
Covering physician- received request from [facility] regarding anticoagulation recommendations for planned DEIDRA series. Reviewed chart- hx of persistent AF on Eliquis. Recommendation to hold medication 2 days (48 hours) prior to each procedure is appropriate with resumption immediately following procedure if ok'd by interventional physician. Attempted to contact patient to discuss directly- no answer and left voicemail. Need to discuss risk/benefits with patient directly. Patient to understand risk/benefit of holding medication for intervention including increased risk of stroke and accepts risks, ok to hold and proceed appropriately. Follow-up needed with patient.

## 2023-10-09 ENCOUNTER — HOSPITAL ENCOUNTER (OUTPATIENT)
Dept: OTHER | Age: 68
Setting detail: THERAPIES SERIES
Discharge: HOME | End: 2023-10-09
Payer: MEDICARE

## 2023-10-09 VITALS
HEART RATE: 74 BPM | WEIGHT: 226 LBS | SYSTOLIC BLOOD PRESSURE: 136 MMHG | BODY MASS INDEX: 35.4 KG/M2 | RESPIRATION RATE: 18 BRPM | DIASTOLIC BLOOD PRESSURE: 78 MMHG | OXYGEN SATURATION: 94 %

## 2023-10-09 LAB
ANION GAP SERPL CALCULATED.3IONS-SCNC: 14 MMOL/L (ref 7–16)
BNP SERPL-MCNC: 1329 PG/ML (ref 0–125)
BUN SERPL-MCNC: 28 MG/DL (ref 6–23)
CALCIUM SERPL-MCNC: 10.4 MG/DL (ref 8.6–10.2)
CHLORIDE SERPL-SCNC: 97 MMOL/L (ref 98–107)
CO2 SERPL-SCNC: 24 MMOL/L (ref 22–29)
CREAT SERPL-MCNC: 1.2 MG/DL (ref 0.7–1.2)
GFR SERPL CREATININE-BSD FRML MDRD: >60 ML/MIN/1.73M2
GLUCOSE SERPL-MCNC: 204 MG/DL (ref 74–99)
POTASSIUM SERPL-SCNC: 4.2 MMOL/L (ref 3.5–5)
SODIUM SERPL-SCNC: 135 MMOL/L (ref 132–146)

## 2023-10-09 PROCEDURE — 99214 OFFICE O/P EST MOD 30 MIN: CPT

## 2023-10-09 PROCEDURE — 80048 BASIC METABOLIC PNL TOTAL CA: CPT

## 2023-10-09 PROCEDURE — 6360000002 HC RX W HCPCS: Performed by: NURSE PRACTITIONER

## 2023-10-09 PROCEDURE — 96374 THER/PROPH/DIAG INJ IV PUSH: CPT

## 2023-10-09 PROCEDURE — 2580000003 HC RX 258: Performed by: NURSE PRACTITIONER

## 2023-10-09 PROCEDURE — 83880 ASSAY OF NATRIURETIC PEPTIDE: CPT

## 2023-10-09 RX ORDER — SODIUM CHLORIDE 0.9 % (FLUSH) 0.9 %
10 SYRINGE (ML) INJECTION 2 TIMES DAILY
Status: DISCONTINUED | OUTPATIENT
Start: 2023-10-09 | End: 2023-10-10 | Stop reason: HOSPADM

## 2023-10-09 RX ORDER — FUROSEMIDE 10 MG/ML
40 INJECTION INTRAMUSCULAR; INTRAVENOUS ONCE
Status: COMPLETED | OUTPATIENT
Start: 2023-10-09 | End: 2023-10-09

## 2023-10-09 RX ADMIN — SODIUM CHLORIDE, PRESERVATIVE FREE 10 ML: 5 INJECTION INTRAVENOUS at 10:13

## 2023-10-09 RX ADMIN — FUROSEMIDE 40 MG: 10 INJECTION, SOLUTION INTRAMUSCULAR; INTRAVENOUS at 10:13

## 2023-10-09 NOTE — PROGRESS NOTES
Congestive Heart Failure [address]       Referring Provider: -    Primary Care Physician: Nora Samuel MD   Cardiologist: -  Nephrologist: Dr. Ashely Jones:     Gladys Chauhan is a 77 y.o. (1955) male with a history of HFpEF, most recent EF:  Lab Results   Component Value Date    LVEF 65 08/23/2022     He presents to the CHF clinic for ongoing evaluation and monitoring of heart failure.     In the CHF clinic today he denies any adverse symptoms except:  [] Dizziness or lightheadedness   [] Syncope or near syncope  [] Recent Fall  [] Shortness of breath at rest   [x] Dyspnea with exertion  [] Decline in functional capacity (unable to perform activities they had previously been able to do)  [] Fatigue   [] Orthopnea  [] PND  [] Nocturnal cough  [] Hemoptysis  [] Chest pain, pressure, or discomfort  [] Palpitations  [x] Abdominal distention  [] Abdominal bloating  [] Early satiety  [] Blood in stool   [] Diarrhea  [] Constipation  [] Nausea/Vomiting  [] Decreased urinary response to oral diuretic   [] Scrotal swelling   [x] Lower extremity edema  [] Used PRN doses of oral diuretic   [] Weight gain    Wt Readings from Last 3 Encounters:   10/09/23 226 lb (102.5 kg)   09/22/23 224 lb (101.6 kg)   09/12/23 232 lb (105.2 kg)           SOCIAL HISTORY:  [x] Denies tobacco, alcohol or illicit drug abuse  [] Tobacco use:  [] ETOH use:  [] Illicit drug use:        MEDICATIONS:    No Known Allergies    Current Outpatient Medications:     ferrous sulfate (IRON 325) 325 (65 Fe) MG tablet, Take 1 tablet by mouth every other day, Disp: 45 tablet, Rfl: 1    atorvastatin (LIPITOR) 40 MG tablet, TAKE ONE TABLET BY MOUTH DAILY, Disp: 90 tablet, Rfl: 1    metoprolol succinate (TOPROL XL) 25 MG extended release tablet, Take 1 tablet by mouth daily, Disp: 90 tablet, Rfl: 1    apixaban (ELIQUIS) 5 MG TABS tablet, Take 1 tablet by mouth 2 times daily, Disp: 60

## 2023-10-09 NOTE — TELEPHONE ENCOUNTER
Patient came to office. Discussed [place] request for [facility] and recommendations of holding anticoagulation. Discussed risks/benefits with patient and needs. Patient verified understanding and accepted plan. He at this time does not have scheduled time for procedure but will await scheduling and follow requested directions. Encouraged to have wife call with any questions.

## 2023-10-09 NOTE — PLAN OF CARE
Problem: Chronic Conditions and Co-morbidities  Goal: Patient's chronic conditions and co-morbidity symptoms are monitored and maintained or improved  Flowsheets (Taken 10/9/2023 9159)  Care Plan - Patient's Chronic Conditions and Co-Morbidity Symptoms are Monitored and Maintained or Improved: Monitor and assess patient's chronic conditions and comorbid symptoms for stability, deterioration, or improvement 374.1

## 2023-10-24 ENCOUNTER — APPOINTMENT (OUTPATIENT)
Dept: OTHER | Age: 68
End: 2023-10-24
Payer: MEDICARE

## 2023-10-24 VITALS
BODY MASS INDEX: 33.52 KG/M2 | HEART RATE: 70 BPM | RESPIRATION RATE: 18 BRPM | DIASTOLIC BLOOD PRESSURE: 57 MMHG | OXYGEN SATURATION: 96 % | SYSTOLIC BLOOD PRESSURE: 117 MMHG | WEIGHT: 214 LBS

## 2023-10-24 LAB
ANION GAP SERPL CALCULATED.3IONS-SCNC: 16 MMOL/L (ref 7–16)
BNP SERPL-MCNC: 1662 PG/ML (ref 0–125)
BUN SERPL-MCNC: 30 MG/DL (ref 6–23)
CALCIUM SERPL-MCNC: 9.4 MG/DL (ref 8.6–10.2)
CHLORIDE SERPL-SCNC: 96 MMOL/L (ref 98–107)
CO2 SERPL-SCNC: 23 MMOL/L (ref 22–29)
CREAT SERPL-MCNC: 1.5 MG/DL (ref 0.7–1.2)
GFR SERPL CREATININE-BSD FRML MDRD: 50 ML/MIN/1.73M2
GLUCOSE SERPL-MCNC: 201 MG/DL (ref 74–99)
POTASSIUM SERPL-SCNC: 4 MMOL/L (ref 3.5–5)
SODIUM SERPL-SCNC: 135 MMOL/L (ref 132–146)

## 2023-10-24 PROCEDURE — 2580000003 HC RX 258: Performed by: STUDENT IN AN ORGANIZED HEALTH CARE EDUCATION/TRAINING PROGRAM

## 2023-10-24 PROCEDURE — 99214 OFFICE O/P EST MOD 30 MIN: CPT

## 2023-10-24 PROCEDURE — 83880 ASSAY OF NATRIURETIC PEPTIDE: CPT

## 2023-10-24 PROCEDURE — 6360000002 HC RX W HCPCS: Performed by: STUDENT IN AN ORGANIZED HEALTH CARE EDUCATION/TRAINING PROGRAM

## 2023-10-24 PROCEDURE — 96375 TX/PRO/DX INJ NEW DRUG ADDON: CPT

## 2023-10-24 PROCEDURE — 80048 BASIC METABOLIC PNL TOTAL CA: CPT

## 2023-10-24 RX ORDER — FUROSEMIDE 10 MG/ML
40 INJECTION INTRAMUSCULAR; INTRAVENOUS ONCE
Status: COMPLETED | OUTPATIENT
Start: 2023-10-24 | End: 2023-10-24

## 2023-10-24 RX ORDER — SODIUM CHLORIDE 0.9 % (FLUSH) 0.9 %
10 SYRINGE (ML) INJECTION PRN
Status: DISCONTINUED | OUTPATIENT
Start: 2023-10-24 | End: 2023-10-25 | Stop reason: HOSPADM

## 2023-10-24 RX ADMIN — SODIUM CHLORIDE, PRESERVATIVE FREE 10 ML: 5 INJECTION INTRAVENOUS at 09:26

## 2023-10-24 RX ADMIN — FUROSEMIDE 40 MG: 10 INJECTION, SOLUTION INTRAMUSCULAR; INTRAVENOUS at 09:25

## 2023-10-24 NOTE — PROGRESS NOTES
Congestive Heart Failure [address]       Referring Provider: -    Primary Care Physician: Caprice Ramirez MD   Cardiologist: -  Nephrologist: Dr. Tonie Mcardle:     Gabe Good is a 77 y.o. (1955) male with a history of HFpEF, most recent EF:  Lab Results   Component Value Date    LVEF 65 08/23/2022     10/24/23- Pt down 12 pounds since last visit. He states he has not been eating out as much. Still with JVD, lower extrem edema. IVP Lasix given. He presents to the CHF clinic for ongoing evaluation and monitoring of heart failure.     In the CHF clinic today he denies any adverse symptoms except:  [] Dizziness or lightheadedness   [] Syncope or near syncope  [] Recent Fall  [] Shortness of breath at rest   [x] Dyspnea with exertion  [] Decline in functional capacity (unable to perform activities they had previously been able to do)  [] Fatigue   [] Orthopnea  [] PND  [] Nocturnal cough  [] Hemoptysis  [] Chest pain, pressure, or discomfort  [] Palpitations  [] Abdominal distention  [] Abdominal bloating  [] Early satiety  [] Blood in stool   [] Diarrhea  [] Constipation  [] Nausea/Vomiting  [] Decreased urinary response to oral diuretic   [] Scrotal swelling   [x] Lower extremity edema  [] Used PRN doses of oral diuretic   [] Weight gain    Wt Readings from Last 3 Encounters:   10/24/23 97.1 kg (214 lb)   10/09/23 102.5 kg (226 lb)   09/22/23 101.6 kg (224 lb)           SOCIAL HISTORY:  [x] Denies tobacco, alcohol or illicit drug abuse  [] Tobacco use:  [] ETOH use:  [] Illicit drug use:        MEDICATIONS:    No Known Allergies    Current Outpatient Medications:     ferrous sulfate (IRON 325) 325 (65 Fe) MG tablet, Take 1 tablet by mouth every other day, Disp: 45 tablet, Rfl: 1    atorvastatin (LIPITOR) 40 MG tablet, TAKE ONE TABLET BY MOUTH DAILY, Disp: 90 tablet, Rfl: 1    metoprolol succinate (TOPROL XL) 25 MG extended release

## 2023-11-01 DIAGNOSIS — I48.19 PERSISTENT ATRIAL FIBRILLATION (HCC): Chronic | ICD-10-CM

## 2023-11-01 RX ORDER — APIXABAN 5 MG/1
5 TABLET, FILM COATED ORAL 2 TIMES DAILY
Qty: 60 TABLET | Refills: 2 | Status: SHIPPED | OUTPATIENT
Start: 2023-11-01

## 2023-11-07 ENCOUNTER — HOSPITAL ENCOUNTER (OUTPATIENT)
Dept: OTHER | Age: 68
Setting detail: THERAPIES SERIES
Discharge: HOME | End: 2023-11-07
Payer: MEDICARE

## 2023-11-07 VITALS
DIASTOLIC BLOOD PRESSURE: 60 MMHG | WEIGHT: 227 LBS | HEART RATE: 83 BPM | OXYGEN SATURATION: 99 % | BODY MASS INDEX: 35.55 KG/M2 | SYSTOLIC BLOOD PRESSURE: 137 MMHG | RESPIRATION RATE: 18 BRPM

## 2023-11-07 LAB
ANION GAP SERPL CALCULATED.3IONS-SCNC: 14 MMOL/L (ref 7–16)
BNP SERPL-MCNC: 2121 PG/ML (ref 0–125)
BUN SERPL-MCNC: 38 MG/DL (ref 6–23)
CALCIUM SERPL-MCNC: 8.3 MG/DL (ref 8.6–10.2)
CHLORIDE SERPL-SCNC: 100 MMOL/L (ref 98–107)
CO2 SERPL-SCNC: 22 MMOL/L (ref 22–29)
CREAT SERPL-MCNC: 1.1 MG/DL (ref 0.7–1.2)
GFR SERPL CREATININE-BSD FRML MDRD: >60 ML/MIN/1.73M2
GLUCOSE SERPL-MCNC: 425 MG/DL (ref 74–99)
POTASSIUM SERPL-SCNC: 4.2 MMOL/L (ref 3.5–5)
SODIUM SERPL-SCNC: 136 MMOL/L (ref 132–146)

## 2023-11-07 PROCEDURE — 83880 ASSAY OF NATRIURETIC PEPTIDE: CPT

## 2023-11-07 PROCEDURE — 96374 THER/PROPH/DIAG INJ IV PUSH: CPT

## 2023-11-07 PROCEDURE — 2580000003 HC RX 258: Performed by: STUDENT IN AN ORGANIZED HEALTH CARE EDUCATION/TRAINING PROGRAM

## 2023-11-07 PROCEDURE — 80048 BASIC METABOLIC PNL TOTAL CA: CPT

## 2023-11-07 PROCEDURE — 6360000002 HC RX W HCPCS: Performed by: STUDENT IN AN ORGANIZED HEALTH CARE EDUCATION/TRAINING PROGRAM

## 2023-11-07 PROCEDURE — 99214 OFFICE O/P EST MOD 30 MIN: CPT

## 2023-11-07 RX ORDER — FUROSEMIDE 10 MG/ML
40 INJECTION INTRAMUSCULAR; INTRAVENOUS ONCE
Status: COMPLETED | OUTPATIENT
Start: 2023-11-07 | End: 2023-11-07

## 2023-11-07 RX ORDER — SODIUM CHLORIDE 0.9 % (FLUSH) 0.9 %
5-40 SYRINGE (ML) INJECTION 2 TIMES DAILY
Status: DISCONTINUED | OUTPATIENT
Start: 2023-11-07 | End: 2023-11-08 | Stop reason: HOSPADM

## 2023-11-07 RX ADMIN — FUROSEMIDE 40 MG: 10 INJECTION, SOLUTION INTRAMUSCULAR; INTRAVENOUS at 09:15

## 2023-11-07 RX ADMIN — SODIUM CHLORIDE, PRESERVATIVE FREE 10 ML: 5 INJECTION INTRAVENOUS at 09:15

## 2023-11-07 NOTE — PROGRESS NOTES
Congestive Heart Failure [address]       Referring Provider: -    Primary Care Physician: Larry Thomason MD   Cardiologist: -  Nephrologist: Dr. Michell Miller:     Brayden Posadas is a 77 y.o. (1955) male with a history of HFpEF, most recent EF:  Lab Results   Component Value Date    LVEF 65 08/23/2022         He presents to the CHF clinic for ongoing evaluation and monitoring of heart failure.     In the CHF clinic today he denies any adverse symptoms except:  [] Dizziness or lightheadedness   [] Syncope or near syncope  [] Recent Fall  [] Shortness of breath at rest   [x] Dyspnea with exertion  [] Decline in functional capacity (unable to perform activities they had previously been able to do)  [] Fatigue   [] Orthopnea  [] PND  [] Nocturnal cough  [] Hemoptysis  [] Chest pain, pressure, or discomfort  [] Palpitations  [] Abdominal distention  [] Abdominal bloating  [] Early satiety  [] Blood in stool   [] Diarrhea  [] Constipation  [] Nausea/Vomiting  [] Decreased urinary response to oral diuretic   [] Scrotal swelling   [x] Lower extremity edema  [] Used PRN doses of oral diuretic   [] Weight gain    Wt Readings from Last 3 Encounters:   11/07/23 103 kg (227 lb)   10/24/23 97.1 kg (214 lb)   10/09/23 102.5 kg (226 lb)           SOCIAL HISTORY:  [x] Denies tobacco, alcohol or illicit drug abuse  [] Tobacco use:  [] ETOH use:  [] Illicit drug use:        MEDICATIONS:    No Known Allergies    Current Outpatient Medications:     ELIQUIS 5 MG TABS tablet, TAKE ONE TABLET BY MOUTH TWO TIMES A DAY, Disp: 60 tablet, Rfl: 2    ferrous sulfate (IRON 325) 325 (65 Fe) MG tablet, Take 1 tablet by mouth every other day, Disp: 45 tablet, Rfl: 1    atorvastatin (LIPITOR) 40 MG tablet, TAKE ONE TABLET BY MOUTH DAILY, Disp: 90 tablet, Rfl: 1    metoprolol succinate (TOPROL XL) 25 MG extended release tablet, Take 1 tablet by mouth daily, Disp: 90 tablet, Eye Shield Used: No

## 2023-11-13 ENCOUNTER — OFFICE VISIT (OUTPATIENT)
Dept: INTERNAL MEDICINE | Age: 68
End: 2023-11-13
Payer: MEDICARE

## 2023-11-13 VITALS
WEIGHT: 237 LBS | TEMPERATURE: 97.7 F | SYSTOLIC BLOOD PRESSURE: 122 MMHG | HEIGHT: 67 IN | BODY MASS INDEX: 37.2 KG/M2 | DIASTOLIC BLOOD PRESSURE: 58 MMHG | RESPIRATION RATE: 20 BRPM | OXYGEN SATURATION: 96 % | HEART RATE: 61 BPM

## 2023-11-13 DIAGNOSIS — D64.9 NORMOCYTIC ANEMIA: ICD-10-CM

## 2023-11-13 DIAGNOSIS — J44.9 CHRONIC OBSTRUCTIVE PULMONARY DISEASE, UNSPECIFIED COPD TYPE (HCC): ICD-10-CM

## 2023-11-13 DIAGNOSIS — E78.5 HYPERLIPIDEMIA, UNSPECIFIED HYPERLIPIDEMIA TYPE: Chronic | ICD-10-CM

## 2023-11-13 DIAGNOSIS — E11.40 TYPE 2 DIABETES MELLITUS WITH DIABETIC NEUROPATHY, WITH LONG-TERM CURRENT USE OF INSULIN (HCC): Primary | ICD-10-CM

## 2023-11-13 DIAGNOSIS — Z79.4 TYPE 2 DIABETES MELLITUS WITH DIABETIC NEUROPATHY, WITH LONG-TERM CURRENT USE OF INSULIN (HCC): Primary | ICD-10-CM

## 2023-11-13 DIAGNOSIS — E03.9 HYPOTHYROIDISM, UNSPECIFIED TYPE: Chronic | ICD-10-CM

## 2023-11-13 DIAGNOSIS — I50.31 ACUTE HEART FAILURE WITH PRESERVED EJECTION FRACTION (HFPEF) (HCC): ICD-10-CM

## 2023-11-13 LAB — HBA1C MFR BLD: 8.3 %

## 2023-11-13 PROCEDURE — 83036 HEMOGLOBIN GLYCOSYLATED A1C: CPT | Performed by: STUDENT IN AN ORGANIZED HEALTH CARE EDUCATION/TRAINING PROGRAM

## 2023-11-13 PROCEDURE — 99212 OFFICE O/P EST SF 10 MIN: CPT | Performed by: STUDENT IN AN ORGANIZED HEALTH CARE EDUCATION/TRAINING PROGRAM

## 2023-11-13 ASSESSMENT — ENCOUNTER SYMPTOMS
VOMITING: 0
NAUSEA: 0
ABDOMINAL DISTENTION: 0
SORE THROAT: 0
CONSTIPATION: 0
ABDOMINAL PAIN: 0
COUGH: 0
DIARRHEA: 0
SHORTNESS OF BREATH: 1
WHEEZING: 0

## 2023-11-13 NOTE — PROGRESS NOTES
[address] [facility]  Internal Medicine Residency Program  [facility] Note      SUBJECTIVE:  CC: had concerns including Diabetes (    -549), Medication Refill, Pain (Generalize arthritic pain ), Edema (Lower extremities , shins redden with pitting edema), and Shortness of Breath (Especially with exertion). HPI:  Brayden Posadas is a 77 y.o.male with PMH of HFpEF (65%), HTN, HLD, CKD II, IDDM2, Hypothyroidism, Persistent AF, Sinus Node Dysfunction with internal pacemaker in place, Obesity, NIKO, Obesity Hypoventilation Syndrome, Gout, Vitamin D deficiency, Chronic back pain, basal cell carcinoma of scalp s/p excision presenting to [facility] for 3 month follow up.     IDDM2  - Last A1c 9.5% on 5/2023, today 8.3%  - On Basaglar 25u BID  - Basaglar with holding parameter to not take it when his BG is below 180  - Prescribed Ozempic in recent past, but he was unable to obtain the medication  - BG logs checked, range 111-324,      HFpEF  - Bumex 2 mg MWF  - Taking Toprol-XL 25 mg daily  - No ACEI/ARB due to renal function and constant episodes of RADHA, has tried being back on Lisinopril recently but did not tolerate again  - NYHA-3  - Follows with CHF clinic  - Noted increased edema and crackles today, discussed he will take bumex daily for this week and then return to 3 times a week next week     HTN  - On Toprol-XL 25 mg  - BP today 122/58     HLD  - On Atorvastatin 40 mg  - Last Lipid Panel 11/2022, due for repeat     CKD  - Sees Nephrology, Dr. Ochoa January  - Creatinine now stable, previous Creatinine rise was RADHA related to lisinopril     Obesity Hypoventilation Syndrome  - Was on nightly oxygen at 5L, but states stopped due to nasal dryness, still uses occasionally if feels its needed  - Follows up with Dr. Román Field     Persistent AF  - On Toprol-XL and Eliquis     Sinus Node Dysfunction s/p dual chamber pacemaker since 10/2017  - Follows up with EP  - Denies any symptoms     Hypothyroidism  - On Synthroid

## 2023-11-23 ENCOUNTER — HOSPITAL ENCOUNTER (EMERGENCY)
Age: 68
Discharge: HOME | End: 2023-11-23
Attending: EMERGENCY MEDICINE
Payer: MEDICARE

## 2023-11-23 VITALS
BODY MASS INDEX: 36.1 KG/M2 | HEIGHT: 67 IN | OXYGEN SATURATION: 95 % | DIASTOLIC BLOOD PRESSURE: 64 MMHG | RESPIRATION RATE: 18 BRPM | SYSTOLIC BLOOD PRESSURE: 131 MMHG | HEART RATE: 70 BPM | WEIGHT: 230 LBS | TEMPERATURE: 97.7 F

## 2023-11-23 DIAGNOSIS — H92.02 LEFT EAR PAIN: Primary | ICD-10-CM

## 2023-11-23 DIAGNOSIS — R60.9 PERIPHERAL EDEMA: ICD-10-CM

## 2023-11-23 LAB
ALBUMIN SERPL-MCNC: 3.8 G/DL (ref 3.5–5.2)
ALP SERPL-CCNC: 133 U/L (ref 40–129)
ALT SERPL-CCNC: 17 U/L (ref 0–40)
ANION GAP SERPL CALCULATED.3IONS-SCNC: 12 MMOL/L (ref 7–16)
AST SERPL-CCNC: 19 U/L (ref 0–39)
BASOPHILS # BLD: 0.04 K/UL (ref 0–0.2)
BASOPHILS NFR BLD: 1 % (ref 0–2)
BILIRUB SERPL-MCNC: 1.1 MG/DL (ref 0–1.2)
BNP SERPL-MCNC: 1532 PG/ML (ref 0–125)
BUN SERPL-MCNC: 36 MG/DL (ref 6–23)
CALCIUM SERPL-MCNC: 8.9 MG/DL (ref 8.6–10.2)
CHLORIDE SERPL-SCNC: 98 MMOL/L (ref 98–107)
CO2 SERPL-SCNC: 26 MMOL/L (ref 22–29)
CREAT SERPL-MCNC: 1.7 MG/DL (ref 0.7–1.2)
EOSINOPHIL # BLD: 0.45 K/UL (ref 0.05–0.5)
EOSINOPHILS RELATIVE PERCENT: 7 % (ref 0–6)
ERYTHROCYTE [DISTWIDTH] IN BLOOD BY AUTOMATED COUNT: 15.1 % (ref 11.5–15)
GFR SERPL CREATININE-BSD FRML MDRD: 42 ML/MIN/1.73M2
GLUCOSE SERPL-MCNC: 166 MG/DL (ref 74–99)
HCT VFR BLD AUTO: 33.5 % (ref 37–54)
HGB BLD-MCNC: 10.7 G/DL (ref 12.5–16.5)
IMM GRANULOCYTES # BLD AUTO: 0.12 K/UL (ref 0–0.58)
IMM GRANULOCYTES NFR BLD: 2 % (ref 0–5)
LYMPHOCYTES NFR BLD: 0.76 K/UL (ref 1.5–4)
LYMPHOCYTES RELATIVE PERCENT: 12 % (ref 20–42)
MCH RBC QN AUTO: 30.1 PG (ref 26–35)
MCHC RBC AUTO-ENTMCNC: 31.9 G/DL (ref 32–34.5)
MCV RBC AUTO: 94.1 FL (ref 80–99.9)
MONOCYTES NFR BLD: 0.49 K/UL (ref 0.1–0.95)
MONOCYTES NFR BLD: 8 % (ref 2–12)
NEUTROPHILS NFR BLD: 70 % (ref 43–80)
NEUTS SEG NFR BLD: 4.35 K/UL (ref 1.8–7.3)
PLATELET # BLD AUTO: 200 K/UL (ref 130–450)
PMV BLD AUTO: 9.4 FL (ref 7–12)
POTASSIUM SERPL-SCNC: 4 MMOL/L (ref 3.5–5)
PROT SERPL-MCNC: 7.4 G/DL (ref 6.4–8.3)
RBC # BLD AUTO: 3.56 M/UL (ref 3.8–5.8)
SODIUM SERPL-SCNC: 136 MMOL/L (ref 132–146)
WBC OTHER # BLD: 6.2 K/UL (ref 4.5–11.5)

## 2023-11-23 PROCEDURE — 85025 COMPLETE CBC W/AUTO DIFF WBC: CPT

## 2023-11-23 PROCEDURE — 83880 ASSAY OF NATRIURETIC PEPTIDE: CPT

## 2023-11-23 PROCEDURE — 99284 EMERGENCY DEPT VISIT MOD MDM: CPT

## 2023-11-23 PROCEDURE — 93005 ELECTROCARDIOGRAM TRACING: CPT

## 2023-11-23 PROCEDURE — 80053 COMPREHEN METABOLIC PANEL: CPT

## 2023-11-23 PROCEDURE — 2580000003 HC RX 258

## 2023-11-23 RX ORDER — 0.9 % SODIUM CHLORIDE 0.9 %
250 INTRAVENOUS SOLUTION INTRAVENOUS ONCE
Status: COMPLETED | OUTPATIENT
Start: 2023-11-23 | End: 2023-11-23

## 2023-11-23 RX ADMIN — SODIUM CHLORIDE 250 ML: 9 INJECTION, SOLUTION INTRAVENOUS at 12:29

## 2023-11-23 ASSESSMENT — ENCOUNTER SYMPTOMS
CHEST TIGHTNESS: 0
CONSTIPATION: 0
VOMITING: 0
SHORTNESS OF BREATH: 1
DIARRHEA: 0
NAUSEA: 0
BLOOD IN STOOL: 0
RHINORRHEA: 0

## 2023-11-23 NOTE — DISCHARGE INSTR - COC
11/26/2013    Influenza Virus Vaccine 10/21/2010, 11/26/2013, 10/28/2014, 10/14/2015, 09/21/2016, 10/30/2017, 08/25/2020    Influenza, FLUAD, (age 72 y+), Adjuvanted, 0.5mL 08/25/2020, 10/11/2022, 10/23/2023    Influenza, FLUARIX, FLULAVAL, FLUZONE (age 10 mo+) AND AFLURIA, (age 1 y+), PF, 0.5mL 10/01/2018, 08/25/2020    Influenza, FLUZONE (age 72 y+), High Dose, 0.7mL 10/11/2021    Influenza, Quadv, 6 mo and older, IM (Fluzone, Flulaval) 10/30/2017    Influenza, Triv, 3 Years and older, IM (Afluria (5 yrs and older) 09/21/2016    Influenza, Triv, inactivated, subunit, adjuvanted, IM (Fluad 65 yrs and older) 10/18/2018, 10/15/2019    Pneumococcal Conjugate 7-valent (Prevnar7) 08/10/2007    Pneumococcal, PCV-13, PREVNAR 15, (age 6w+), IM, 0.5mL 02/03/2017    Pneumococcal, PPSV23, PNEUMOVAX 23, (age 2y+), SC/IM, 0.5mL 08/08/2012, 08/27/2018, 10/01/2018    TDaP, ADACEL (age 6y-58y), BOOSTRIX (age 10y+), IM, 0.5mL 09/21/2016    Td, unspecified formulation 01/01/2005    Zoster Live (Zostavax) 10/23/2015    Zoster Recombinant (Shingrix) 11/19/2022, 05/16/2023       Active Problems:  Patient Active Problem List   Diagnosis Code    Class 2 obesity due to excess calories with serious comorbidity and body mass index (BMI) of 38.0 to 38.9 in adult [id number]    Hyperlipidemia E78.5    Essential hypertension I10    Hypothyroidism E03.9    Arthritis of shoulder region, left M19.012    OA (osteoarthritis of the spine) M47.9    NIKO (obstructive sleep apnea) G47.33    Restrictive lung disease secondary to obesity J98.4, E66.9    Persistent atrial fibrillation (HCC) I48.19    Chronic obstructive pulmonary disease, unspecified COPD type ([address]) J44.9    Acute heart failure with preserved ejection fraction (HFpEF) ([facility]) I50.31    Normocytic anemia D64.9    Stage 3b chronic kidney disease (HCC) N18.32    Hypercalcemia E83.52    Proteinuria due to type 2 diabetes mellitus (HCC) E11.29, R80.9    Neoplasm of soft tissue D49.2    Gout M10.9

## 2023-11-23 NOTE — ED NOTES
Department of Emergency Medicine   ED  Provider Note    Pt Name: Eagle Israel         MRN: [id number]         [address] [facility] [place] 1955    Admit Date/RoomTime: 11/23/2023 10:35 AM  ED Room: DARLENE/DARLENE      Chief Complaint   Patient presents with    Otalgia     Left ear pain started this morning     Generalized Body Aches     For about a month - saw PCP about a week ago         HPI     Eagle Israel is a 77 y.o. male with PMHx of CHF, hyperlipidemia, hypertension, hypothyroidism, NIKO, stage IIIb CKD presenting to the ED for chief complaint of otalgia and generalized bodyaches. Patient states that he awoke this morning with left ear pain that felt traveled down the jaw/to the throat. He states that he has not had any congestion/rhinorrhea/sore throat. He was in normal health until symptoms began this morning. He additionally states that he has had some generalized bodyaches. He denies any chest pain or pressure or shortness of breath changed from baseline. He denies any other acute complaint however his wife does state that he appears to be holding onto more fluid in the legs than his usual.  Patient does take Bumex 3 times weekly and is supposed to wear compression stockings but states that he has not worn these in at least 3 days. Suspect otalgia due to cerumen burden as well as increasing peripheral edema due to compression stocking noncompliance. Review of Systems   Constitutional:  Negative for chills and fever. HENT:  Positive for ear pain. Negative for congestion, ear discharge and rhinorrhea. Respiratory:  Positive for shortness of breath (baseline). Negative for chest tightness. Cardiovascular:  Negative for chest pain and palpitations. Gastrointestinal:  Negative for blood in stool, constipation, diarrhea, nausea and vomiting. Genitourinary:  Negative for difficulty urinating, dysuria and hematuria. Musculoskeletal:  Positive for arthralgias and myalgias.    Skin:  Negative for rash and

## 2023-11-24 LAB
EKG ATRIAL RATE: 72 BPM
EKG P-R INTERVAL: 292 MS
EKG Q-T INTERVAL: 496 MS
EKG QRS DURATION: 174 MS
EKG QTC CALCULATION (BAZETT): 519 MS
EKG R AXIS: -90 DEGREES
EKG T AXIS: 90 DEGREES
EKG VENTRICULAR RATE: 66 BPM

## 2023-11-24 PROCEDURE — 93010 ELECTROCARDIOGRAM REPORT: CPT | Performed by: INTERNAL MEDICINE

## 2023-11-27 ENCOUNTER — HOSPITAL ENCOUNTER (OUTPATIENT)
Age: 68
Discharge: HOME | End: 2023-11-27
Payer: MEDICARE

## 2023-11-27 DIAGNOSIS — E03.9 HYPOTHYROIDISM, UNSPECIFIED TYPE: Chronic | ICD-10-CM

## 2023-11-27 DIAGNOSIS — D64.9 NORMOCYTIC ANEMIA: ICD-10-CM

## 2023-11-27 DIAGNOSIS — E78.5 HYPERLIPIDEMIA, UNSPECIFIED HYPERLIPIDEMIA TYPE: Chronic | ICD-10-CM

## 2023-11-27 LAB
BASOPHILS # BLD: 0.06 K/UL (ref 0–0.2)
BASOPHILS NFR BLD: 1 % (ref 0–2)
CHOLEST SERPL-MCNC: 99 MG/DL
EOSINOPHIL # BLD: 0.37 K/UL (ref 0.05–0.5)
EOSINOPHILS RELATIVE PERCENT: 7 % (ref 0–6)
ERYTHROCYTE [DISTWIDTH] IN BLOOD BY AUTOMATED COUNT: 15.6 % (ref 11.5–15)
HCT VFR BLD AUTO: 35.4 % (ref 37–54)
HDLC SERPL-MCNC: 19 MG/DL
HGB BLD-MCNC: 11.3 G/DL (ref 12.5–16.5)
IMM GRANULOCYTES # BLD AUTO: 0.17 K/UL (ref 0–0.58)
IMM GRANULOCYTES NFR BLD: 3 % (ref 0–5)
LDLC SERPL CALC-MCNC: 50 MG/DL
LYMPHOCYTES NFR BLD: 0.96 K/UL (ref 1.5–4)
LYMPHOCYTES RELATIVE PERCENT: 17 % (ref 20–42)
MCH RBC QN AUTO: 30.2 PG (ref 26–35)
MCHC RBC AUTO-ENTMCNC: 31.9 G/DL (ref 32–34.5)
MCV RBC AUTO: 94.7 FL (ref 80–99.9)
MONOCYTES NFR BLD: 0.57 K/UL (ref 0.1–0.95)
MONOCYTES NFR BLD: 10 % (ref 2–12)
NEUTROPHILS NFR BLD: 62 % (ref 43–80)
NEUTS SEG NFR BLD: 3.42 K/UL (ref 1.8–7.3)
PLATELET # BLD AUTO: 222 K/UL (ref 130–450)
PMV BLD AUTO: 9.6 FL (ref 7–12)
RBC # BLD AUTO: 3.74 M/UL (ref 3.8–5.8)
T4 FREE SERPL-MCNC: 1.5 NG/DL (ref 0.9–1.7)
TRIGL SERPL-MCNC: 150 MG/DL
TSH SERPL DL<=0.05 MIU/L-ACNC: 13.28 UIU/ML (ref 0.27–4.2)
VLDLC SERPL CALC-MCNC: 30 MG/DL
WBC OTHER # BLD: 5.6 K/UL (ref 4.5–11.5)

## 2023-11-27 PROCEDURE — 80061 LIPID PANEL: CPT

## 2023-11-27 PROCEDURE — 85025 COMPLETE CBC W/AUTO DIFF WBC: CPT

## 2023-11-27 PROCEDURE — 84439 ASSAY OF FREE THYROXINE: CPT

## 2023-11-27 PROCEDURE — 84443 ASSAY THYROID STIM HORMONE: CPT

## 2023-11-28 ENCOUNTER — APPOINTMENT (OUTPATIENT)
Dept: OTHER | Age: 68
End: 2023-11-28
Payer: MEDICARE

## 2023-11-28 VITALS
SYSTOLIC BLOOD PRESSURE: 131 MMHG | HEART RATE: 70 BPM | OXYGEN SATURATION: 93 % | WEIGHT: 224 LBS | DIASTOLIC BLOOD PRESSURE: 66 MMHG | BODY MASS INDEX: 35.08 KG/M2 | RESPIRATION RATE: 18 BRPM

## 2023-11-28 PROCEDURE — 99214 OFFICE O/P EST MOD 30 MIN: CPT

## 2023-11-28 NOTE — PLAN OF CARE
Problem: Chronic Conditions and Co-morbidities  Goal: Patient's chronic conditions and co-morbidity symptoms are monitored and maintained or improved  Flowsheets (Taken 11/28/2023 2105)  Care Plan - Patient's Chronic Conditions and Co-Morbidity Symptoms are Monitored and Maintained or Improved: Monitor and assess patient's chronic conditions and comorbid symptoms for stability, deterioration, or improvement

## 2023-11-28 NOTE — PROGRESS NOTES
consulted    Predictive Model Details          13%  Factor Value    End of Life Care Index Model 21% Age 76 y/o     12% Last RDW Janie 15.6 %     11% Has Chronic Ulcer of Skin Yes     11% Legal Sex Male     9% Has Nonhypertensive Congestive Heart Failure Yes     7% Prescribed Narcotic Analgesic Yes     6% Last Albumin Janie 3.8 g/dL     6% Has COPD or Bronchiectasis Yes     5% Has Medical Device Yes     5% Has Fluid or Electrolyte Disorder Yes     3% Has Cardiac Conduction Disorder Yes     2% Prescribed Antidiabetic Yes     2% Prescribed Diuretic Yes             HEART FAILURE FOCUSED PHYSICAL EXAMINATION:    Vitals:    11/28/23 1000   BP: 131/66   Pulse: 70   Resp: 18   SpO2: 93%        Assessment  Charting Type: Shift assessment        HEENT (Head, Ears, Eyes, Nose, & Throat)  HEENT (WDL): Within Defined Limits  Respiratory  Respiratory Pattern: Regular  Respiratory Depth: Normal  Respiratory Quality/Effort: Dyspnea with exertion  Chest Assessment: Chest expansion symmetrical  L Breath Sounds: Diminished  R Breath Sounds: Diminished  Breath Sounds  Right Upper Lobe: Clear, Diminished  Right Middle Lobe: Diminished, Clear  Right Lower Lobe: Diminished, Clear  Left Upper Lobe: Diminished, Clear  Left Lower Lobe: Clear, Diminished     Cardiac  Cardiac Rhythm: Ventricular paced  Rhythm Interpretation  Pulse: 70     Gastrointestinal  Abdominal (WDL): Exceptions to WDL  Abdomen Inspection: Soft, Rounded, Distended  RUQ Bowel Sounds: Active  LUQ Bowel Sounds: Active  RLQ Bowel Sounds: Active  LLQ Bowel Sounds: Active      Bowel Sounds  RUQ Bowel Sounds: Active  LUQ Bowel Sounds: Active  RLQ Bowel Sounds: Active  LLQ Bowel Sounds:  Active  Peripheral Vascular  Peripheral Vascular (WDL): Exceptions to WDL  Edema: Left lower extremity, Right lower extremity  RLE Edema: Pitting, +2  LLE Edema: Pitting, +2        Musculoskeletal  RL Extremity: Swelling  LL Extremity: Swelling     Psychosocial  Psychosocial (WDL): Within Defined

## 2023-12-04 ENCOUNTER — OFFICE VISIT (OUTPATIENT)
Dept: PRIMARY CARE CLINIC | Age: 68
End: 2023-12-04
Payer: MEDICARE

## 2023-12-04 ENCOUNTER — HOSPITAL ENCOUNTER (INPATIENT)
Age: 68
LOS: 2 days | Discharge: HOME | End: 2023-12-06
Attending: STUDENT IN AN ORGANIZED HEALTH CARE EDUCATION/TRAINING PROGRAM | Admitting: PSYCHIATRY & NEUROLOGY
Payer: MEDICARE

## 2023-12-04 ENCOUNTER — TELEPHONE (OUTPATIENT)
Dept: INTERNAL MEDICINE | Age: 68
End: 2023-12-04

## 2023-12-04 VITALS
HEART RATE: 85 BPM | BODY MASS INDEX: 35.79 KG/M2 | SYSTOLIC BLOOD PRESSURE: 138 MMHG | DIASTOLIC BLOOD PRESSURE: 70 MMHG | TEMPERATURE: 97.4 F | HEIGHT: 67 IN | OXYGEN SATURATION: 95 % | WEIGHT: 228 LBS | RESPIRATION RATE: 17 BRPM

## 2023-12-04 DIAGNOSIS — R45.851 SUICIDAL THOUGHTS: Primary | ICD-10-CM

## 2023-12-04 DIAGNOSIS — Z79.4 TYPE 2 DIABETES MELLITUS WITH DIABETIC NEUROPATHY, WITH LONG-TERM CURRENT USE OF INSULIN (HCC): ICD-10-CM

## 2023-12-04 DIAGNOSIS — R53.83 OTHER FATIGUE: ICD-10-CM

## 2023-12-04 DIAGNOSIS — G25.0 ESSENTIAL TREMOR: ICD-10-CM

## 2023-12-04 DIAGNOSIS — E11.40 TYPE 2 DIABETES MELLITUS WITH DIABETIC NEUROPATHY, WITH LONG-TERM CURRENT USE OF INSULIN (HCC): ICD-10-CM

## 2023-12-04 DIAGNOSIS — R45.851 SUICIDAL IDEATION: Primary | ICD-10-CM

## 2023-12-04 PROBLEM — F32.9 MAJOR DEPRESSIVE DISORDER WITH CURRENT ACTIVE EPISODE, UNSPECIFIED DEPRESSION EPISODE SEVERITY, UNSPECIFIED WHETHER RECURRENT: Status: ACTIVE | Noted: 2023-12-04

## 2023-12-04 LAB
AMPHET UR QL SCN: NEGATIVE
ANION GAP SERPL CALCULATED.3IONS-SCNC: 13 MMOL/L (ref 7–16)
APAP SERPL-MCNC: <5 UG/ML (ref 10–30)
BARBITURATES UR QL SCN: NEGATIVE
BASOPHILS # BLD: 0.09 K/UL (ref 0–0.2)
BASOPHILS NFR BLD: 1 % (ref 0–2)
BENZODIAZ UR QL: NEGATIVE
BUN SERPL-MCNC: 27 MG/DL (ref 6–23)
BUPRENORPHINE UR QL: NEGATIVE
CALCIUM SERPL-MCNC: 9.4 MG/DL (ref 8.6–10.2)
CANNABINOIDS UR QL SCN: NEGATIVE
CHLORIDE SERPL-SCNC: 98 MMOL/L (ref 98–107)
CHP ED QC CHECK: NORMAL
CO2 SERPL-SCNC: 24 MMOL/L (ref 22–29)
COCAINE UR QL SCN: NEGATIVE
CREAT SERPL-MCNC: 1.3 MG/DL (ref 0.7–1.2)
EKG ATRIAL RATE: 70 BPM
EKG P AXIS: 8 DEGREES
EKG Q-T INTERVAL: 444 MS
EKG QRS DURATION: 170 MS
EKG QTC CALCULATION (BAZETT): 518 MS
EKG R AXIS: -95 DEGREES
EKG T AXIS: 86 DEGREES
EKG VENTRICULAR RATE: 82 BPM
EOSINOPHIL # BLD: 0.27 K/UL (ref 0.05–0.5)
EOSINOPHILS RELATIVE PERCENT: 4 % (ref 0–6)
ERYTHROCYTE [DISTWIDTH] IN BLOOD BY AUTOMATED COUNT: 15.4 % (ref 11.5–15)
ETHANOLAMINE SERPL-MCNC: <10 MG/DL
FENTANYL UR QL: NEGATIVE
GFR SERPL CREATININE-BSD FRML MDRD: 59 ML/MIN/1.73M2
GLUCOSE BLD-MCNC: 158 MG/DL (ref 74–99)
GLUCOSE BLD-MCNC: 233 MG/DL
GLUCOSE SERPL-MCNC: 137 MG/DL (ref 74–99)
HCT VFR BLD AUTO: 35.7 % (ref 37–54)
HGB BLD-MCNC: 11.6 G/DL (ref 12.5–16.5)
IMM GRANULOCYTES # BLD AUTO: 0.14 K/UL (ref 0–0.58)
IMM GRANULOCYTES NFR BLD: 2 % (ref 0–5)
LYMPHOCYTES NFR BLD: 0.68 K/UL (ref 1.5–4)
LYMPHOCYTES RELATIVE PERCENT: 10 % (ref 20–42)
MCH RBC QN AUTO: 30.2 PG (ref 26–35)
MCHC RBC AUTO-ENTMCNC: 32.5 G/DL (ref 32–34.5)
MCV RBC AUTO: 93 FL (ref 80–99.9)
METHADONE UR QL: NEGATIVE
MONOCYTES NFR BLD: 0.68 K/UL (ref 0.1–0.95)
MONOCYTES NFR BLD: 10 % (ref 2–12)
NEUTROPHILS NFR BLD: 72 % (ref 43–80)
NEUTS SEG NFR BLD: 4.66 K/UL (ref 1.8–7.3)
OPIATES UR QL SCN: POSITIVE
OXYCODONE UR QL SCN: NEGATIVE
PCP UR QL SCN: NEGATIVE
PLATELET # BLD AUTO: 210 K/UL (ref 130–450)
PMV BLD AUTO: 9.7 FL (ref 7–12)
POTASSIUM SERPL-SCNC: 3.9 MMOL/L (ref 3.5–5)
RBC # BLD AUTO: 3.84 M/UL (ref 3.8–5.8)
SALICYLATES SERPL-MCNC: <0.3 MG/DL (ref 0–30)
SODIUM SERPL-SCNC: 135 MMOL/L (ref 132–146)
TEST INFORMATION: ABNORMAL
TOXIC TRICYCLIC SC,BLOOD: NEGATIVE
WBC OTHER # BLD: 6.5 K/UL (ref 4.5–11.5)

## 2023-12-04 PROCEDURE — 93010 ELECTROCARDIOGRAM REPORT: CPT | Performed by: INTERNAL MEDICINE

## 2023-12-04 PROCEDURE — 1124F ACP DISCUSS-NO DSCNMKR DOCD: CPT | Performed by: NURSE PRACTITIONER

## 2023-12-04 PROCEDURE — 81003 URINALYSIS AUTO W/O SCOPE: CPT

## 2023-12-04 PROCEDURE — 80179 DRUG ASSAY SALICYLATE: CPT

## 2023-12-04 PROCEDURE — 99204 OFFICE O/P NEW MOD 45 MIN: CPT | Performed by: NURSE PRACTITIONER

## 2023-12-04 PROCEDURE — 6370000000 HC RX 637 (ALT 250 FOR IP): Performed by: STUDENT IN AN ORGANIZED HEALTH CARE EDUCATION/TRAINING PROGRAM

## 2023-12-04 PROCEDURE — 3075F SYST BP GE 130 - 139MM HG: CPT | Performed by: NURSE PRACTITIONER

## 2023-12-04 PROCEDURE — 93005 ELECTROCARDIOGRAM TRACING: CPT | Performed by: STUDENT IN AN ORGANIZED HEALTH CARE EDUCATION/TRAINING PROGRAM

## 2023-12-04 PROCEDURE — 82962 GLUCOSE BLOOD TEST: CPT | Performed by: NURSE PRACTITIONER

## 2023-12-04 PROCEDURE — 6370000000 HC RX 637 (ALT 250 FOR IP): Performed by: PSYCHIATRY & NEUROLOGY

## 2023-12-04 PROCEDURE — 3052F HG A1C>EQUAL 8.0%<EQUAL 9.0%: CPT | Performed by: NURSE PRACTITIONER

## 2023-12-04 PROCEDURE — 1240000000 HC EMOTIONAL WELLNESS R&B

## 2023-12-04 PROCEDURE — 3078F DIAST BP <80 MM HG: CPT | Performed by: NURSE PRACTITIONER

## 2023-12-04 PROCEDURE — 82962 GLUCOSE BLOOD TEST: CPT

## 2023-12-04 PROCEDURE — G0480 DRUG TEST DEF 1-7 CLASSES: HCPCS

## 2023-12-04 PROCEDURE — 99285 EMERGENCY DEPT VISIT HI MDM: CPT

## 2023-12-04 PROCEDURE — 80143 DRUG ASSAY ACETAMINOPHEN: CPT

## 2023-12-04 PROCEDURE — 85025 COMPLETE CBC W/AUTO DIFF WBC: CPT

## 2023-12-04 PROCEDURE — 80307 DRUG TEST PRSMV CHEM ANLYZR: CPT

## 2023-12-04 PROCEDURE — 80048 BASIC METABOLIC PNL TOTAL CA: CPT

## 2023-12-04 RX ORDER — HALOPERIDOL 5 MG/ML
3 INJECTION INTRAMUSCULAR EVERY 6 HOURS PRN
Status: DISCONTINUED | OUTPATIENT
Start: 2023-12-04 | End: 2023-12-06 | Stop reason: HOSPADM

## 2023-12-04 RX ORDER — NICOTINE 21 MG/24HR
1 PATCH, TRANSDERMAL 24 HOURS TRANSDERMAL DAILY
Status: DISCONTINUED | OUTPATIENT
Start: 2023-12-05 | End: 2023-12-06 | Stop reason: HOSPADM

## 2023-12-04 RX ORDER — CHOLECALCIFEROL (VITAMIN D3) 50 MCG
2000 TABLET ORAL DAILY
Status: DISCONTINUED | OUTPATIENT
Start: 2023-12-04 | End: 2023-12-06 | Stop reason: HOSPADM

## 2023-12-04 RX ORDER — ASPIRIN 81 MG/1
81 TABLET, CHEWABLE ORAL DAILY
Status: DISCONTINUED | OUTPATIENT
Start: 2023-12-04 | End: 2023-12-06 | Stop reason: HOSPADM

## 2023-12-04 RX ORDER — HALOPERIDOL 2 MG/1
3 TABLET ORAL EVERY 6 HOURS PRN
Status: DISCONTINUED | OUTPATIENT
Start: 2023-12-04 | End: 2023-12-06 | Stop reason: HOSPADM

## 2023-12-04 RX ORDER — ACETAMINOPHEN 325 MG/1
650 TABLET ORAL EVERY 4 HOURS PRN
Status: DISCONTINUED | OUTPATIENT
Start: 2023-12-04 | End: 2023-12-06 | Stop reason: HOSPADM

## 2023-12-04 RX ORDER — HYDROCODONE BITARTRATE AND ACETAMINOPHEN 5; 325 MG/1; MG/1
1 TABLET ORAL ONCE
Status: COMPLETED | OUTPATIENT
Start: 2023-12-04 | End: 2023-12-04

## 2023-12-04 RX ORDER — HYDROXYZINE PAMOATE 25 MG/1
25 CAPSULE ORAL 3 TIMES DAILY PRN
Status: DISCONTINUED | OUTPATIENT
Start: 2023-12-04 | End: 2023-12-06 | Stop reason: HOSPADM

## 2023-12-04 RX ORDER — INSULIN GLARGINE 100 [IU]/ML
25 INJECTION, SOLUTION SUBCUTANEOUS 2 TIMES DAILY
Status: DISCONTINUED | OUTPATIENT
Start: 2023-12-04 | End: 2023-12-06 | Stop reason: HOSPADM

## 2023-12-04 RX ORDER — MAGNESIUM HYDROXIDE/ALUMINUM HYDROXICE/SIMETHICONE 120; 1200; 1200 MG/30ML; MG/30ML; MG/30ML
30 SUSPENSION ORAL PRN
Status: DISCONTINUED | OUTPATIENT
Start: 2023-12-04 | End: 2023-12-06 | Stop reason: HOSPADM

## 2023-12-04 RX ORDER — LANOLIN ALCOHOL/MO/W.PET/CERES
3 CREAM (GRAM) TOPICAL NIGHTLY PRN
Status: DISCONTINUED | OUTPATIENT
Start: 2023-12-04 | End: 2023-12-06 | Stop reason: HOSPADM

## 2023-12-04 RX ADMIN — APIXABAN 5 MG: 5 TABLET, FILM COATED ORAL at 20:09

## 2023-12-04 RX ADMIN — MELATONIN 3 MG ORAL TABLET 3 MG: 3 TABLET ORAL at 23:47

## 2023-12-04 RX ADMIN — HYDROCODONE BITARTRATE AND ACETAMINOPHEN 1 TABLET: 5; 325 TABLET ORAL at 18:27

## 2023-12-04 RX ADMIN — Medication 2000 UNITS: at 20:09

## 2023-12-04 RX ADMIN — ASPIRIN 81 MG CHEWABLE TABLET 81 MG: 81 TABLET CHEWABLE at 20:09

## 2023-12-04 ASSESSMENT — PAIN SCALES - GENERAL
PAINLEVEL_OUTOF10: 0
PAINLEVEL_OUTOF10: 10

## 2023-12-04 ASSESSMENT — PATIENT HEALTH QUESTIONNAIRE - PHQ9
3. TROUBLE FALLING OR STAYING ASLEEP: 1
SUM OF ALL RESPONSES TO PHQ QUESTIONS 1-9: 27
9. THOUGHTS THAT YOU WOULD BE BETTER OFF DEAD, OR OF HURTING YOURSELF: 0
8. MOVING OR SPEAKING SO SLOWLY THAT OTHER PEOPLE COULD HAVE NOTICED. OR THE OPPOSITE, BEING SO FIGETY OR RESTLESS THAT YOU HAVE BEEN MOVING AROUND A LOT MORE THAN USUAL: 0
6. FEELING BAD ABOUT YOURSELF - OR THAT YOU ARE A FAILURE OR HAVE LET YOURSELF OR YOUR FAMILY DOWN: 0
5. POOR APPETITE OR OVEREATING: 1
10. IF YOU CHECKED OFF ANY PROBLEMS, HOW DIFFICULT HAVE THESE PROBLEMS MADE IT FOR YOU TO DO YOUR WORK, TAKE CARE OF THINGS AT HOME, OR GET ALONG WITH OTHER PEOPLE: 0
4. FEELING TIRED OR HAVING LITTLE ENERGY: 3
7. TROUBLE CONCENTRATING ON THINGS, SUCH AS READING THE NEWSPAPER OR WATCHING TELEVISION: 0
6. FEELING BAD ABOUT YOURSELF - OR THAT YOU ARE A FAILURE OR HAVE LET YOURSELF OR YOUR FAMILY DOWN: 3
SUM OF ALL RESPONSES TO PHQ9 QUESTIONS 1 & 2: 6
4. FEELING TIRED OR HAVING LITTLE ENERGY: 0
SUM OF ALL RESPONSES TO PHQ QUESTIONS 1-9: 2
3. TROUBLE FALLING OR STAYING ASLEEP: 3
2. FEELING DOWN, DEPRESSED OR HOPELESS: 0
SUM OF ALL RESPONSES TO PHQ QUESTIONS 1-9: 27
9. THOUGHTS THAT YOU WOULD BE BETTER OFF DEAD, OR OF HURTING YOURSELF: 3
2. FEELING DOWN, DEPRESSED OR HOPELESS: 3
SUM OF ALL RESPONSES TO PHQ QUESTIONS 1-9: 24
5. POOR APPETITE OR OVEREATING: 3
10. IF YOU CHECKED OFF ANY PROBLEMS, HOW DIFFICULT HAVE THESE PROBLEMS MADE IT FOR YOU TO DO YOUR WORK, TAKE CARE OF THINGS AT HOME, OR GET ALONG WITH OTHER PEOPLE: 0
SUM OF ALL RESPONSES TO PHQ QUESTIONS 1-9: 2
SUM OF ALL RESPONSES TO PHQ QUESTIONS 1-9: 2
7. TROUBLE CONCENTRATING ON THINGS, SUCH AS READING THE NEWSPAPER OR WATCHING TELEVISION: 3
SUM OF ALL RESPONSES TO PHQ QUESTIONS 1-9: 2
SUM OF ALL RESPONSES TO PHQ QUESTIONS 1-9: 27
DEPRESSION UNABLE TO ASSESS: PT REFUSES
SUM OF ALL RESPONSES TO PHQ9 QUESTIONS 1 & 2: 0
1. LITTLE INTEREST OR PLEASURE IN DOING THINGS: 3
8. MOVING OR SPEAKING SO SLOWLY THAT OTHER PEOPLE COULD HAVE NOTICED. OR THE OPPOSITE, BEING SO FIGETY OR RESTLESS THAT YOU HAVE BEEN MOVING AROUND A LOT MORE THAN USUAL: 3
1. LITTLE INTEREST OR PLEASURE IN DOING THINGS: 0

## 2023-12-04 ASSESSMENT — COLUMBIA-SUICIDE SEVERITY RATING SCALE - C-SSRS
4. HAVE YOU HAD THESE THOUGHTS AND HAD SOME INTENTION OF ACTING ON THEM?: NO
7. DID THIS OCCUR IN THE LAST THREE MONTHS: YES
2. HAVE YOU ACTUALLY HAD ANY THOUGHTS OF KILLING YOURSELF?: YES
5. HAVE YOU STARTED TO WORK OUT OR WORKED OUT THE DETAILS OF HOW TO KILL YOURSELF? DO YOU INTEND TO CARRY OUT THIS PLAN?: YES
6. HAVE YOU EVER DONE ANYTHING, STARTED TO DO ANYTHING, OR PREPARED TO DO ANYTHING TO END YOUR LIFE?: NO
3. HAVE YOU BEEN THINKING ABOUT HOW YOU MIGHT KILL YOURSELF?: YES
1. WITHIN THE PAST MONTH, HAVE YOU WISHED YOU WERE DEAD OR WISHED YOU COULD GO TO SLEEP AND NOT WAKE UP?: YES

## 2023-12-04 ASSESSMENT — PAIN - FUNCTIONAL ASSESSMENT
PAIN_FUNCTIONAL_ASSESSMENT: NONE - DENIES PAIN
PAIN_FUNCTIONAL_ASSESSMENT: NONE - DENIES PAIN

## 2023-12-04 ASSESSMENT — LIFESTYLE VARIABLES
HOW OFTEN DO YOU HAVE A DRINK CONTAINING ALCOHOL: NEVER
HOW MANY STANDARD DRINKS CONTAINING ALCOHOL DO YOU HAVE ON A TYPICAL DAY: PATIENT DOES NOT DRINK
HOW MANY STANDARD DRINKS CONTAINING ALCOHOL DO YOU HAVE ON A TYPICAL DAY: PATIENT DOES NOT DRINK
HOW OFTEN DO YOU HAVE A DRINK CONTAINING ALCOHOL: NEVER

## 2023-12-04 ASSESSMENT — SLEEP AND FATIGUE QUESTIONNAIRES
AVERAGE NUMBER OF SLEEP HOURS: 5
DO YOU USE A SLEEP AID: NO
SLEEP PATTERN: DISTURBED/INTERRUPTED SLEEP
DO YOU HAVE DIFFICULTY SLEEPING: YES

## 2023-12-04 ASSESSMENT — PAIN DESCRIPTION - LOCATION: LOCATION: BACK

## 2023-12-04 NOTE — TELEPHONE ENCOUNTER
Pt walked in not feeling well wanting to see his Dr states his Dr is familiar with what's going on with him. Explained to pt no openings in office this week as well as his specific Dr not being here.  Advised pt of che lockett in

## 2023-12-04 NOTE — PROGRESS NOTES
Conjunctiva/sclera: Conjunctivae normal.      Pupils: Pupils are equal, round, and reactive to light. Neck:      Trachea: Trachea normal.   Cardiovascular:      Rate and Rhythm: Normal rate and regular rhythm. Pulses: Normal pulses. Heart sounds: Normal heart sounds. Pulmonary:      Effort: Pulmonary effort is normal.      Breath sounds: Normal breath sounds. Abdominal:      General: Abdomen is flat. Bowel sounds are normal.      Palpations: Abdomen is soft. Musculoskeletal:         General: Normal range of motion. Cervical back: Normal range of motion. Comments: Slight tremor of hands   Lymphadenopathy:      Cervical: No cervical adenopathy. Skin:     General: Skin is warm and dry. Capillary Refill: Capillary refill takes less than 2 seconds. Neurological:      Mental Status: He is alert and oriented to person, place, and time. Psychiatric:         Attention and Perception: Attention normal.         Mood and Affect: Mood is depressed. Affect is flat. Speech: Speech normal.         Behavior: Behavior normal. Behavior is cooperative. Thought Content: Thought content includes suicidal ideation. Thought content does not include suicidal plan. Cognition and Memory: Cognition normal.         Judgment: Judgment normal.       Test Results Section   (All laboratory and radiology results have been personally reviewed by myself)  Labs:  Results for orders placed or performed in visit on 12/04/23   POCT Glucose   Result Value Ref Range    POC Glucose 233     QC OK? Imaging: All Radiology results interpreted by Radiologist unless otherwise noted. No results found. Medical Decision Making   MDM:   70-year-old male who presents today for tremors, fatigue, decreased appetite and suicidal ideations. Patient states that these have all been going on for approximately 2 weeks. He denies any mental health history.   He is type II diabetic on insulin, he did not

## 2023-12-04 NOTE — ED NOTES
Behavioral Health Crisis Assessment      Chief Complaint: \"I came here because I'm having these tremors. \"     Mental Status Exam: Alert, oriented x4, mood depressed, affect restricted but within normal limits, eye contact good, speech normal in rate flow and volume, behavior is cooperative, appropriate, no signs of agitation, thought form logical, thought content clear, denies A/V hallucinations, delusions, paranoia, none evident in interview. Pt reports suicidal ideation and access to weapons, denies acute intent currently but reports has had ideation. Denies hx of attempts, denies  homicidal ideation intent or plan. Legal Status:  [] Voluntary:  [x] Involuntary, Issued by:ED doctor    Gender:  [x] Male [] Female [] Transgender  [] Other    Sexual Orientation:  [x] Heterosexual [] Homosexual [] Bisexual [] Other    Brief Clinical Summary:Pt is a 78 yo male who presents to the ED as a walk-in accompanied by his wife. Reports he has many chronic health issues which give him significant pain and that he currently is active with a local pain management clinic. Reports he presented for medical issues today but when asked by the doctor about suicide he admitted that he has had thoughts of suicide, stated to  he would shoot himself and that he has access to weapons. Reports he has been having thoughts like this past 2 or 3 weeks. In SW interview pt reports he is not actively suicidal but today that he has had thoughts on numerous occasions. Reports: \"With the kind of medical issues I have and at my age I think most people would think about suicide. \"    Collateral Information: None    Risk Factors:  Access to weapons  Gender risk (males over 60)  Chronic medical issues  No out-patient provider      Protective Factors:  Strong family support - wife  Initiated this ER visit  Help seeking behaviors  Safe and stable housing  Access to essential needs  Stable income  Medication compliant      Suicidal Ideations:  [x]

## 2023-12-04 NOTE — ED NOTES
Sitting on side of bed visiting with wife. No distress noted on rounds.       Jovita Pereyra RN  12/04/23 0484

## 2023-12-04 NOTE — ED PROVIDER NOTES
Department of Emergency Medicine   ED  Provider Note  Admit Date/RoomTime: 12/4/2023 12:34 PM  ED Room: 7306/7306-A          History of Present Illness:    12/4/23, Time: 12:40 PM DILIP Layton is a 77 y.o. male presenting to the ED for complaint of suicidal ideation. States he does have an active plan wanting to shoot self and does have access to guns at home. He states he has been depressed over the past several days. That makes it better or worse. Denies any hallucinations or hearing any voices. Denies any other acute plaints this time. Denies any drug or alcohol abuse at this time. No other acute complaints. ,     Review of Systems:   Pertinent positives and negatives are stated within HPI, all other systems reviewed and are negative.        --------------------------------------------- PAST HISTORY ---------------------------------------------  Past Medical History:  has a past medical history of RADHA (acute kidney injury) ([address]), Atrial fibrillation ([address]), Carpal tunnel syndrome, Colorectal polyps, Diverticula of colon, Encounter regarding vascular access for dialysis for ESRD ([address]), Hyperlipidemia, Hypertension, Hypothyroidism, Lung nodule, Lung nodules, Nocturnal hypoxemia due to obesity, Obesity, NIKO (obstructive sleep apnea), Osteoarthritis, Pacemaker, Pinched nerve, Restrictive lung disease secondary to obesity, S/P laminectomy with spinal fusion, Sinus node dysfunction ([address]), Skin lesion of face, and Type II or unspecified type diabetes mellitus without mention of complication, not stated as uncontrolled. Past Surgical History:  has a past surgical history that includes Colonoscopy (2007); back surgery (2012); eye surgery; hernia repair; Colonoscopy (06/04/2012); back surgery (05/30/2017); pacemaker placement (10/03/2017); Rotator cuff repair (Right); Colonoscopy (04/26/2022); Colonoscopy (N/A, 4/26/2022); back surgery (N/A, 11/8/2022); and back surgery (Left, 1/26/2023).     Social that are the same as other medications prescribed for you. Read the directions carefully, and ask your doctor or other care provider to review them with you. ASK your doctor about these medications      acetaminophen 500 MG tablet  Commonly known as: TYLENOL     allopurinol 100 MG tablet  Commonly known as: ZYLOPRIM  Take 2 tablets by mouth daily     aspirin 81 MG EC tablet     atorvastatin 40 MG tablet  Commonly known as: LIPITOR  TAKE ONE TABLET BY MOUTH DAILY     Basaglar KwikPen 100 UNIT/ML injection pen  Generic drug: insulin glargine  Inject 25 Units into the skin 2 times daily Do not administer if blood sugar is below 180. PAP medication. bumetanide 2 MG tablet  Commonly known as: BUMEX     Eliquis 5 MG Tabs tablet  Generic drug: apixaban  TAKE ONE TABLET BY MOUTH TWO TIMES A DAY     ferrous sulfate 325 (65 Fe) MG tablet  Commonly known as: IRON 325  Take 1 tablet by mouth every other day     HYDROcodone-acetaminophen 7.5-325 MG per tablet  Commonly known as: NORCO     * levothyroxine 88 MCG tablet  Commonly known as: SYNTHROID     * levothyroxine 88 MCG tablet  Commonly known as: SYNTHROID  Take 1 tablet by mouth Daily     metoprolol succinate 25 MG extended release tablet  Commonly known as: TOPROL XL  Take 1 tablet by mouth daily     OneTouch Verio strip  Generic drug: blood glucose test strips  TEST IN THE MORNING AND IN THE EVENING     Potassium 99 MG Tabs     vitamin D 50 MCG (2000 UT) Caps capsule           * This list has 2 medication(s) that are the same as other medications prescribed for you. Read the directions carefully, and ask your doctor or other care provider to review them with you.                       Re-Evaluations:             ED Course as of 12/06/23 0047   Mon Dec 04, 2023   1316 EKG as inter myself rate of   Rate of 82   paced rhythm,   left axis stable intervals no acute ST elevation or depression  No previous to compare to [CB]      ED Course User Index  [CB]

## 2023-12-05 PROBLEM — F32.9 MAJOR DEPRESSIVE DISORDER WITH CURRENT ACTIVE EPISODE, UNSPECIFIED DEPRESSION EPISODE SEVERITY, UNSPECIFIED WHETHER RECURRENT: Status: RESOLVED | Noted: 2023-12-04 | Resolved: 2023-12-05

## 2023-12-05 PROBLEM — F06.30 MOOD DISORDER DUE TO MEDICAL CONDITION: Status: ACTIVE | Noted: 2023-12-05

## 2023-12-05 LAB
BILIRUB UR QL STRIP: NEGATIVE
CLARITY UR: CLEAR
COLOR UR: YELLOW
COMMENT: NORMAL
GLUCOSE BLD-MCNC: 133 MG/DL (ref 74–99)
GLUCOSE BLD-MCNC: 141 MG/DL (ref 74–99)
GLUCOSE BLD-MCNC: 150 MG/DL (ref 74–99)
GLUCOSE BLD-MCNC: 166 MG/DL (ref 74–99)
GLUCOSE UR STRIP-MCNC: NEGATIVE MG/DL
HGB UR QL STRIP.AUTO: NEGATIVE
KETONES UR STRIP-MCNC: NEGATIVE MG/DL
LEUKOCYTE ESTERASE UR QL STRIP: NEGATIVE
NITRITE UR QL STRIP: NEGATIVE
PH UR STRIP: 6.5 [PH] (ref 5–9)
PROT UR STRIP-MCNC: NEGATIVE MG/DL
SP GR UR STRIP: 1.01 (ref 1–1.03)
UROBILINOGEN UR STRIP-ACNC: 1 EU/DL (ref 0–1)

## 2023-12-05 PROCEDURE — 1240000000 HC EMOTIONAL WELLNESS R&B

## 2023-12-05 PROCEDURE — 6370000000 HC RX 637 (ALT 250 FOR IP): Performed by: STUDENT IN AN ORGANIZED HEALTH CARE EDUCATION/TRAINING PROGRAM

## 2023-12-05 PROCEDURE — 6370000000 HC RX 637 (ALT 250 FOR IP): Performed by: NURSE PRACTITIONER

## 2023-12-05 PROCEDURE — 82962 GLUCOSE BLOOD TEST: CPT

## 2023-12-05 PROCEDURE — 6370000000 HC RX 637 (ALT 250 FOR IP): Performed by: PSYCHIATRY & NEUROLOGY

## 2023-12-05 PROCEDURE — 90792 PSYCH DIAG EVAL W/MED SRVCS: CPT | Performed by: NURSE PRACTITIONER

## 2023-12-05 RX ORDER — LEVOTHYROXINE SODIUM 88 UG/1
88 TABLET ORAL DAILY
Status: DISCONTINUED | OUTPATIENT
Start: 2023-12-05 | End: 2023-12-06 | Stop reason: HOSPADM

## 2023-12-05 RX ORDER — FERROUS SULFATE 325(65) MG
325 TABLET ORAL EVERY OTHER DAY
Status: DISCONTINUED | OUTPATIENT
Start: 2023-12-05 | End: 2023-12-06 | Stop reason: HOSPADM

## 2023-12-05 RX ORDER — ALLOPURINOL 100 MG/1
200 TABLET ORAL DAILY
Status: DISCONTINUED | OUTPATIENT
Start: 2023-12-05 | End: 2023-12-06 | Stop reason: HOSPADM

## 2023-12-05 RX ORDER — METOPROLOL SUCCINATE 25 MG/1
25 TABLET, EXTENDED RELEASE ORAL DAILY
Status: DISCONTINUED | OUTPATIENT
Start: 2023-12-05 | End: 2023-12-06 | Stop reason: HOSPADM

## 2023-12-05 RX ORDER — MULTIVIT-MIN/IRON/FOLIC ACID/K 18-600-40
2000 CAPSULE ORAL DAILY
Status: DISCONTINUED | OUTPATIENT
Start: 2023-12-05 | End: 2023-12-05 | Stop reason: SDUPTHER

## 2023-12-05 RX ORDER — BUMETANIDE 1 MG/1
2 TABLET ORAL
Status: DISCONTINUED | OUTPATIENT
Start: 2023-12-06 | End: 2023-12-06 | Stop reason: HOSPADM

## 2023-12-05 RX ORDER — LEVOTHYROXINE SODIUM 88 UG/1
88 TABLET ORAL DAILY
COMMUNITY

## 2023-12-05 RX ORDER — ATORVASTATIN CALCIUM 40 MG/1
40 TABLET, FILM COATED ORAL NIGHTLY
Status: DISCONTINUED | OUTPATIENT
Start: 2023-12-05 | End: 2023-12-06 | Stop reason: HOSPADM

## 2023-12-05 RX ADMIN — ACETAMINOPHEN 650 MG: 325 TABLET ORAL at 21:23

## 2023-12-05 RX ADMIN — APIXABAN 5 MG: 5 TABLET, FILM COATED ORAL at 21:23

## 2023-12-05 RX ADMIN — APIXABAN 5 MG: 5 TABLET, FILM COATED ORAL at 08:46

## 2023-12-05 RX ADMIN — Medication 2000 UNITS: at 08:46

## 2023-12-05 RX ADMIN — INSULIN GLARGINE 25 UNITS: 100 INJECTION, SOLUTION SUBCUTANEOUS at 21:26

## 2023-12-05 RX ADMIN — ASPIRIN 81 MG CHEWABLE TABLET 81 MG: 81 TABLET CHEWABLE at 08:46

## 2023-12-05 RX ADMIN — SERTRALINE 25 MG: 50 TABLET, FILM COATED ORAL at 13:13

## 2023-12-05 RX ADMIN — ATORVASTATIN CALCIUM 40 MG: 40 TABLET, FILM COATED ORAL at 21:23

## 2023-12-05 RX ADMIN — MELATONIN 3 MG ORAL TABLET 3 MG: 3 TABLET ORAL at 21:23

## 2023-12-05 RX ADMIN — METOPROLOL SUCCINATE 25 MG: 25 TABLET, EXTENDED RELEASE ORAL at 17:09

## 2023-12-05 RX ADMIN — FERROUS SULFATE TAB 325 MG (65 MG ELEMENTAL FE) 325 MG: 325 (65 FE) TAB at 17:09

## 2023-12-05 ASSESSMENT — PAIN DESCRIPTION - DESCRIPTORS: DESCRIPTORS: ACHING

## 2023-12-05 ASSESSMENT — PATIENT HEALTH QUESTIONNAIRE - PHQ9
SUM OF ALL RESPONSES TO PHQ QUESTIONS 1-9: 0
SUM OF ALL RESPONSES TO PHQ9 QUESTIONS 1 & 2: 0
SUM OF ALL RESPONSES TO PHQ QUESTIONS 1-9: 0
SUM OF ALL RESPONSES TO PHQ QUESTIONS 1-9: 0
1. LITTLE INTEREST OR PLEASURE IN DOING THINGS: 0
SUM OF ALL RESPONSES TO PHQ QUESTIONS 1-9: 0
2. FEELING DOWN, DEPRESSED OR HOPELESS: 0

## 2023-12-05 ASSESSMENT — SLEEP AND FATIGUE QUESTIONNAIRES
DO YOU HAVE DIFFICULTY SLEEPING: YES
SLEEP PATTERN: DISTURBED/INTERRUPTED SLEEP
DO YOU USE A SLEEP AID: NO
AVERAGE NUMBER OF SLEEP HOURS: 5

## 2023-12-05 ASSESSMENT — LIFESTYLE VARIABLES
HOW MANY STANDARD DRINKS CONTAINING ALCOHOL DO YOU HAVE ON A TYPICAL DAY: PATIENT DOES NOT DRINK
HOW OFTEN DO YOU HAVE A DRINK CONTAINING ALCOHOL: NEVER

## 2023-12-05 ASSESSMENT — PAIN SCALES - GENERAL
PAINLEVEL_OUTOF10: 0
PAINLEVEL_OUTOF10: 10

## 2023-12-05 ASSESSMENT — PAIN DESCRIPTION - ORIENTATION: ORIENTATION: MID

## 2023-12-05 ASSESSMENT — PAIN DESCRIPTION - LOCATION: LOCATION: BACK

## 2023-12-05 NOTE — ED NOTES
Pt accepted to Dr Fransisca Cortez service per Section G RN Anthony Aquino in admitting notified of assigned bed [address] [place] [address] on [address] aware of pending admission.       JUAN Zee, [place]  12/04/23 2124

## 2023-12-05 NOTE — ED NOTES
Patient presented to Dr. Sherly Thomas. He accepts will place orders.      Rupesh Posadsa RN  12/04/23 7354

## 2023-12-05 NOTE — BH NOTE
[facility]  Admission Note       77year old male admitted from section g via wheelchair. A&O x4. Able to ambulate independently. Denies SI/HI and A/V/H. Oriented to room and unit. Snack provided. Patients states he is in here because \" I opened my big mouth and told that Doctor I feel like committing suicide\". Admission Type:   Admission Type:  Involuntary    Reason for admission:  Reason for Admission: \"Cause I opened my big mouth and told that doctor I feel like commiting suicide\"      Addictive Behavior:   Addictive Behavior  In the Past 3 Months, Have You Felt or Has Someone Told You That You Have a Problem With  : None    Medical Problems:   Past Medical History:   Diagnosis Date    RADHA (acute kidney injury) ([address]) 3/10/2022    Atrial fibrillation ([address])     Carpal tunnel syndrome     Colorectal polyps 4/15/2013    Diverticula of colon 4/15/2013    Encounter regarding vascular access for dialysis for ESRD ([address]) 3/12/2022    Hyperlipidemia     Hypertension     Hypothyroidism     Lung nodule     Lung nodules 4/15/2013    Nocturnal hypoxemia due to obesity 8/8/2013    Obesity     NIKO (obstructive sleep apnea) 8/8/2013    [facility]    Osteoarthritis     Pacemaker     DOI 10/3/2017 [employer]; dual chamber; MRI conditional  Indication: Sinus node dysfunction     Pinched nerve     Lumbar    Restrictive lung disease secondary to obesity 7/25/2016    S/P laminectomy with spinal fusion 4/15/2013    Done at [facility], March 2012     Sinus node dysfunction ([address])     Skin lesion of face 11/16/2022    Type II or unspecified type diabetes mellitus without mention of complication, not stated as uncontrolled        Status EXAM:  Mental Status and Behavioral Exam  Normal: No  Level of Assistance: Independent/Self  Facial Expression: Flat, Brightened  Affect: Blunt  Level of Consciousness: Alert  Frequency of Checks: 4 times per hour, close  Mood:Normal: No  Mood: Anxious, Depressed  Motor Depressed  Motor Activity:Normal: No  Motor Activity: Decreased  Eye Contact: Good  Observed Behavior: Cooperative  Sexual Misconduct History: Current - no  Preception: [place] to person, [place] to time, [place] to place, [place] to situation  Attention:Normal: No  Attention: Distractible  Thought Processes: Unremarkable  Thought Content:Normal: No  Thought Content: Preoccupations  Depression Symptoms: Appetite change  Anxiety Symptoms: Generalized  Estela Symptoms: No problems reported or observed.   Hallucinations: None  Delusions: No  Memory:Normal: No  Memory: Poor recent  Insight and Judgment: No  Insight and Judgment: Poor judgment, Poor insight    Tobacco Screening:  Practical Counseling, on admission, sophie X, if applicable and completed (first 3 are required if patient doesn't refuse):            ( ) Recognizing danger situations (included triggers and roadblocks)                    ( ) Coping skills (new ways to manage stress,relaxation techniques, changing routine, distraction)                                                           ( ) Basic information about quitting (benefits of quitting, techniques in how to quit, available resources  ( ) Referral for counseling faxed to [phone]                                                                                                                   ( ) Patient refused counseling  ( x) Patient has not smoked in the last 30 days    Metabolic Screening:    Lab Results   Component Value Date    LABA1C 8.3 11/13/2023       Lab Results   Component Value Date    CHOL 118 11/16/2022    CHOL 120 06/14/2021    CHOL 118 02/06/2020    CHOL 125 09/05/2018    CHOL 124 08/09/2017    CHOL 147 09/19/2016    CHOL 142 10/28/2014    CHOL 177 03/25/2014    CHOL 155 04/11/2013    CHOL 130 04/18/2012    CHOL 107 07/26/2011    CHOL 106 04/27/2011    CHOL 98 12/28/2010    CHOL 216 (H) 10/05/2010     Lab Results   Component Value Date    TRIG 176 (H) 11/16/2022    TRIG
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
## 2023-12-05 NOTE — PLAN OF CARE
Problem: Anxiety  Goal: Will report anxiety at manageable levels  Description: INTERVENTIONS:  1. Administer medication as ordered  2. Teach and rehearse alternative coping skills  3. Provide emotional support with 1:1 interaction with staff  12/5/2023 0645 by Yessenia Sifuentes RN  Outcome: Progressing     Problem: Depression  Goal: Will be euthymic at discharge  Description: INTERVENTIONS:  1. Administer medication as ordered  2. Provide emotional support via 1:1 interaction with staff  3. Encourage involvement in milieu/groups/activities  4.  Monitor for social isolation  12/5/2023 1004 by Kitty Sims RN  Outcome: Progressing  12/5/2023 0645 by Yessenia Sifuentes RN  Outcome: Progressing     Problem: ABCDS Injury Assessment  Goal: Absence of physical injury  Outcome: Progressing

## 2023-12-05 NOTE — GROUP NOTE
Group Therapy Note    Date: 12/5/2023    Group Start Time: 1400  Group End Time: 1430  Group Topic: Cognitive Skills    [facility] 7W ACUTE  2    Shannon Lai        Group Therapy Note    Attendees: 5       Patient's Goal: Patient will be able to identify strengths. Notes:  Pt was not an active participant.      Status After Intervention:  Unchanged    Participation Level: Minimal    Participation Quality: Resistant      Speech:  normal      Thought Process/Content: Logical      Affective Functioning: Flat      Mood: irritable      Level of consciousness:  Alert       Response to Learning: Resistant      Endings: None Reported    Modes of Intervention: Education, Support, Socialization, Exploration, Clarifying, and Problem-solving      Discipline Responsible: Social Work Interm   Signature:      Shannon Lai BSW,LSW Social Work Intern       [facility], MSW, [address]

## 2023-12-05 NOTE — ED NOTES
Called admitting, spoke with [facility].  Assigned bed 7306A     [facility]  12/04/23 2115       [facility]  12/04/23 2122

## 2023-12-05 NOTE — CARE COORDINATION
Biopsychosocial Assessment Note    Social work met with patient to complete the biopsychosocial assessment and C-SSRS. Chief Complaint: \"I said I was going to shoot myself\". Mental Status Exam: Pt presents as A&Ox4, cooperative and friendly with good eye contact. Pt is depressed and anxious, brightens appropriately with conversation. Pt thoughts preoccupied, logical and organized. Pt insight/judgement poor. Pt denied SI/HI/AVH. Clinical Summary: Pt reports that he is here because he went to his doctor and told them he had back pain and loss of appetite over the past two weeks. He reports that he then said that the pain gets os bad sometimes that he wants to \"take a gun and shoot himself\". Pt adamantly denied to SW that he meant this statement, states that he was \"just saying it\". Per ED SW note, pt reported\" \"With the kind of medical issues I have and at my age I think most people would think about suicide. \"\"    Pt reports that this is his first psychiatric admission and that he does not treatment with a mental health provider. He denied any hx of suicide attempts or SI, states that he \"just said\" that he was going to commit suicide, but didn't mean it. Pt denied that he has been feeling hopeless/helpless or that he has had little interest/pleasure in doing things recently. He reports that his only stressor is his back pain. Pt denied substance use, denied a trauma hx, and denied a legal hx. Pt denied any family hx of mental health or family/friend hx of suicide. Pt reports that he lives in a house with his wife of 26yrs and plans to return. Pt reports that they have an adult daughter Assunta Seat he is close with. He states that he works as a  and receives social security income.      Risk Factors: not active with mental health provider  Access to guns per chart  Chronic pain    Protective Factors: support system in place  Access to essential needs  Help-seeking behavior  Safe and stable

## 2023-12-05 NOTE — GROUP NOTE
Group Therapy Note    Date: 12/5/2023    Group Start Time: 1500  Group End Time: 1600  Group Topic: Relaxation    [facility] 7SE ACUTE BH 1    Chandrakant Rocha                                                                        Group Therapy Note    Date: 12/5/2023  Module Name:  activity of choice     Patient's Goal:  Patient will be able to take some down time and relax among her/his peers. Notes:  Pleasant and engaged in group, able to talk with others while watching the tv program, coloring or writing. Status After Intervention:  Improved    Participation Level:  Active Listener and Interactive    Participation Quality: Appropriate and Attentive      Speech:  normal      Thought Process/Content: Logical      Affective Functioning: Congruent      Mood: euthymic      Level of consciousness:  Alert and Oriented x4      Response to Learning: Able to verbalize/acknowledge new learning and Able to retain information      Endings: None Reported    Modes of Intervention: Support, Socialization, and Media      Discipline Responsible: Psychoeducational Specialist      Signature:  Chandrakant Rocha

## 2023-12-05 NOTE — BH NOTE
4 Eyes Skin Assessment     NAME:  Veronica Rocha  YOB: 1955  MEDICAL RECORD NUMBER:  [id number]    The patient is being assessed for  Admission    I agree that at least one RN has performed a thorough Head to Toe Skin Assessment on the patient. ALL assessment sites listed below have been assessed. Areas assessed by both nurses:    Head, Face, Ears, Shoulders, Back, Chest, Arms, Elbows, Hands, and Legs. Feet and Heels        Does the Patient have a Wound?  No noted wound(s)       Aric Prevention initiated by RN: Yes  Wound Care Orders initiated by RN: No    Pressure Injury (Stage 3,4, Unstageable, DTI, NWPT, and Complex wounds) if present, place Wound referral order by RN under : No    New Ostomies, if present place, Ostomy referral order under : No     Nurse 1 eSignature: Electronically signed by Rommel Watt RN on 12/5/23 at 12:58 AM EST    **SHARE this note so that the co-signing nurse can place an eSignature**    Nurse 2 eSignature: Electronically signed by Marla Black RN on 12/5/23 at 1:15 AM EST

## 2023-12-05 NOTE — PROGRESS NOTES
Spiritual Support Group Note    Number of Participants in Group:     5                  Time:  3 pm    Goal: Relief from isolation and loneliness             Jo Ann Sharing             Self-understanding and gain insight              Acceptance and belonging            Recognize they are not alone                Socialization             Empowerment       Encouragement    Topic:  [x] Spiritual Wellness and Self Care                  [x] Hope                     [] Connecting with Divine/Others        [] Thankfulness and Gratitude               []  Meaningfulness and Purpose               [] Forgiveness               [] Peace               [] Connect to [employer]      [] Other    Participation Level:   [x] Active Listener   [] Minimal   [] Monopolizing   [] Interactive   [] No Participation   []  Other:     Attention:   [x] Alert   [] Distractible   [] Drowsy   [] Poor   [] Other:    Manner:   [x] Cooperative   [] Suspicious   [] Withdrawn   [] Guarded   [] Irritable   [] Inhospitable   [] Other:     Others Comments from Group:

## 2023-12-06 VITALS
DIASTOLIC BLOOD PRESSURE: 60 MMHG | SYSTOLIC BLOOD PRESSURE: 111 MMHG | TEMPERATURE: 97.4 F | WEIGHT: 222.2 LBS | HEIGHT: 67 IN | OXYGEN SATURATION: 96 % | RESPIRATION RATE: 16 BRPM | HEART RATE: 78 BPM | BODY MASS INDEX: 34.88 KG/M2

## 2023-12-06 LAB — GLUCOSE BLD-MCNC: 144 MG/DL (ref 74–99)

## 2023-12-06 PROCEDURE — 6370000000 HC RX 637 (ALT 250 FOR IP): Performed by: NURSE PRACTITIONER

## 2023-12-06 PROCEDURE — 99239 HOSP IP/OBS DSCHRG MGMT >30: CPT | Performed by: NURSE PRACTITIONER

## 2023-12-06 PROCEDURE — 82962 GLUCOSE BLOOD TEST: CPT

## 2023-12-06 PROCEDURE — 6370000000 HC RX 637 (ALT 250 FOR IP): Performed by: PSYCHIATRY & NEUROLOGY

## 2023-12-06 PROCEDURE — 6370000000 HC RX 637 (ALT 250 FOR IP): Performed by: STUDENT IN AN ORGANIZED HEALTH CARE EDUCATION/TRAINING PROGRAM

## 2023-12-06 RX ORDER — DEXTROSE MONOHYDRATE 100 MG/ML
INJECTION, SOLUTION INTRAVENOUS CONTINUOUS PRN
Status: DISCONTINUED | OUTPATIENT
Start: 2023-12-06 | End: 2023-12-06 | Stop reason: HOSPADM

## 2023-12-06 RX ADMIN — LEVOTHYROXINE SODIUM 88 MCG: 0.09 TABLET ORAL at 06:38

## 2023-12-06 RX ADMIN — SERTRALINE 50 MG: 50 TABLET, FILM COATED ORAL at 08:54

## 2023-12-06 RX ADMIN — ALLOPURINOL 200 MG: 100 TABLET ORAL at 08:54

## 2023-12-06 RX ADMIN — METOPROLOL SUCCINATE 25 MG: 25 TABLET, EXTENDED RELEASE ORAL at 08:54

## 2023-12-06 RX ADMIN — APIXABAN 5 MG: 5 TABLET, FILM COATED ORAL at 08:54

## 2023-12-06 RX ADMIN — ASPIRIN 81 MG CHEWABLE TABLET 81 MG: 81 TABLET CHEWABLE at 08:54

## 2023-12-06 RX ADMIN — HYDROXYZINE PAMOATE 25 MG: 25 CAPSULE ORAL at 00:13

## 2023-12-06 RX ADMIN — ACETAMINOPHEN 650 MG: 325 TABLET ORAL at 09:30

## 2023-12-06 RX ADMIN — Medication 2000 UNITS: at 08:54

## 2023-12-06 ASSESSMENT — PAIN SCALES - GENERAL: PAINLEVEL_OUTOF10: 8

## 2023-12-06 ASSESSMENT — PAIN DESCRIPTION - LOCATION: LOCATION: BACK

## 2023-12-06 NOTE — CARE COORDINATION
GILDARDO met with pt to discuss discharge. Pt states that he is very hopeful to discharge today. He states that he is feeling good mentally and never meant the SI statements he made. Pt adamantly denied SI/HI/AVH. He states that he is looking forward to returning home with his wife, her to transport. Pt denied wanting to follow with a mental health provider and states that he would like to treat with his PCP for all outpatient needs. GILDARDO contacted pt wife Ana Maria Ventura  (JOESPH signed) to discuss pt discharge for today. No answer, GILDARDO left voicemail. Collateral gained yesterday stated no concerns for pt mental health and that she has locked all the guns/weapons out of pt access. GILDARDO attempted secondary number for Ana Maria Ventura [phone]. No answer, GILDARDO left voicemail.       In order to ensure appropriate transition and discharge planning is in place, the following documents have been transmitted to  PCP Dr. Giancarlo Buenrostro, as the new outpatient provider:    The d/c diagnosis was transmitted to the next care provider  The reason for hospitalization was transmitted to the next care provider  The d/c medications (dosage and indication) were transmitted to the next care provider   The continuing care plan was transmitted to the next care provider

## 2023-12-06 NOTE — CARE COORDINATION
SW contacted pt PCP Dr. Rudolph Reddy  [phone] to schedule appointment for pt. Pt has appointment 12/14 at 2:30pm. Da Bae can fax discharge paperwork to .

## 2023-12-06 NOTE — DISCHARGE INSTRUCTIONS
Patient Name: Edelmira Torres  YOB: 1955   Medical Record Number: [id number]  Date of Admission: [unfilled]   Date of Discharge: ***    Attending Provider: [unfilled]   Discharging Provider: ***  If you have any questions and need to contact this individual, please call the unit at [phone]. [facility] provider will be available on call 24/7 and during holidays. Primary Care Provider: @PCP@    Admission Diagnosis: Suicidal ideation [R45.851]  Major depressive disorder with current active episode, unspecified depression episode severity, unspecified whether recurrent [F32.9]      * No surgery found *    [unfilled]    Screening for Metabolic Disorders for Patients on Antipsychotic Medications  (Data obtained from the EMR)    Estimated Body Mass Index  @BMI@      Fasting Blood Glucose or Hemoglobin A1c  @BRIEFLAB(GLU,GLPOC,GLUCPOC)@    @RESULAST(LABA1C,RVF3KVGA)@    Discharge Diagnosis: ***    Discharge Plan/Destination: ***      Unresulted Labs (24h ago, onward)      None            To obtain results of studies pending at discharge, please contact Medical records at [phone]. Keep all follow-up appointments and take medications as directed. Continue Medications until told to stop. [place] [address]      Call Local Hotline anytime at 211 ______________________________________     Symptoms to report to your Doctor:  Depression, Anxiety  Inability to eat, sleep, or have a bowel movement  Increased sleepiness and lethargy  Voices in your head  Any thoughts of harming self or others     Things to avoid:  Caffeine  Alcohol  No street drugs  Avoid over the counter medications unless approved by your physician or pharmacist.  Driving or operating machinery until full effects of your medications are known if dizzy/drowsy from medications. Education:  Illness and medication teaching was completed.     Advanced Directive:   Does the patient have an

## 2023-12-06 NOTE — BH NOTE
Patient resting with eyes closed. Respirations even and unlabored. No signs of distress. Q15 minute rounds continued.

## 2023-12-06 NOTE — PROGRESS NOTES
[facility]  Discharge Note    Pt discharged with followings belongings:   Dental Appliances: None  Vision - Corrective Lenses: None  Hearing Aid: None  Jewelry: None  Body Piercings Removed: N/A  Clothing: Footwear, Hat, Jacket/Coat, Pants, Shirt, Socks, Sweater  Other Valuables: Other (Comment) (none)     Status EXAM upon discharge:  Mental Status and Behavioral Exam  Normal: No  Level of Assistance: Independent/Self  Facial Expression: Flat  Affect: Blunt  Level of Consciousness: Alert  Frequency of Checks: 4 times per hour, close  Mood:Normal: No  Mood: Anxious  Motor Activity:Normal: No  Motor Activity: Decreased  Eye Contact: Good  Observed Behavior: Agitated  Sexual Misconduct History: Current - no  Preception: [place] to person, [place] to time, [place] to place, [place] to situation  Attention:Normal: No  Attention: Distractible  Thought Processes: Circumstantial  Thought Content:Normal: No  Thought Content: Preoccupations  Depression Symptoms: Isolative  Anxiety Symptoms: Generalized  Estela Symptoms: No problems reported or observed.   Hallucinations: None  Delusions: No  Memory:Normal: Yes  Memory: Poor recent, Poor remote  Insight and Judgment: No  Insight and Judgment: Poor insight    Tobacco Screening:  Practical Counseling, on admission, sophie X, if applicable and completed (first 3 are required if patient doesn't refuse):            ( ) Recognizing danger situations (included triggers and roadblocks)                    ( ) Coping skills (new ways to manage stress,relaxation techniques, changing routine, distraction)                                                           ( ) Basic information about quitting (benefits of quitting, techniques in how to quit, available resources  ( ) Referral for counseling faxed to [facility]                                                                                                                   ( ) Patient refused counseling  ( ) Patient refused referral  ( ) Patient refused prescription upon discharge  ( x) Patient has not smoked in the last 30 days    Metabolic Screening:    Lab Results   Component Value Date    LABA1C 8.3 11/13/2023       Lab Results   Component Value Date    CHOL 118 11/16/2022    CHOL 120 06/14/2021    CHOL 118 02/06/2020    CHOL 125 09/05/2018    CHOL 124 08/09/2017    CHOL 147 09/19/2016    CHOL 142 10/28/2014    CHOL 177 03/25/2014    CHOL 155 04/11/2013    CHOL 130 04/18/2012    CHOL 107 07/26/2011    CHOL 106 04/27/2011    CHOL 98 12/28/2010    CHOL 216 (H) 10/05/2010     Lab Results   Component Value Date    TRIG 176 (H) 11/16/2022    TRIG 165 (H) 06/14/2021    TRIG 136 02/06/2020    TRIG 127 09/05/2018    TRIG 149 08/09/2017    TRIG 195 (H) 09/19/2016    TRIG 258 (H) 10/28/2014    TRIG 255 (H) 03/25/2014    TRIG 132 04/11/2013    TRIG 197 (H) 04/18/2012    TRIG 154 (H) 07/26/2011    TRIG 144 04/27/2011    TRIG 146 12/28/2010    TRIG 202 (H) 10/05/2010     Lab Results   Component Value Date    HDL 19 (L) 11/27/2023    HDL 23 11/16/2022    HDL 24 06/14/2021    HDL 27 02/06/2020    HDL 31 09/05/2018    HDL 24 08/09/2017    HDL 27 09/19/2016    HDL 25 10/28/2014    HDL 25 03/25/2014    HDL 30.0 (A) 04/11/2013    HDL 26.0 (A) 04/18/2012    HDL 27.0 (A) 07/26/2011    HDL 28.0 (A) 04/27/2011    HDL 24.0 (A) 12/28/2010    HDL 33.0 (A) 10/05/2010     No components found for: \"LDLCAL\"  Lab Results   Component Value Date    LABVLDL 35 11/16/2022    LABVLDL 33 06/14/2021    LABVLDL 27 02/06/2020    LABVLDL 25 09/05/2018    LABVLDL 30 08/09/2017    LABVLDL 39 09/19/2016    LABVLDL 52 10/28/2014    LABVLDL 51 03/25/2014       Javid Good, RN

## 2023-12-06 NOTE — GROUP NOTE
Shared goal for the day as to go home. Group Therapy Note    Date: 12/6/2023    Group Start Time: 0435  Group End Time: 2421  Group Topic: Community Meeting    [facility] [address]                                                                 Date: 12/6/2023  Community Meeting   Patient attended morning community meeting. Updated on staffing and daily expectations. Willing to share goal for the day.

## 2023-12-06 NOTE — PLAN OF CARE
Patient denies SI/HI/Hallucinations. Patient rates anxiety 5 and depression 5. No active distress noted, medication compliant. Will continue to monitor and will intervene as needed.        Problem: Chronic Conditions and Co-morbidities  Goal: Patient's chronic conditions and co-morbidity symptoms are monitored and maintained or improved  Outcome: Progressing     Problem: Risk for Elopement  Goal: Patient will not exit the unit/facility without proper excort  12/6/2023 0915 by Kendra Luciano RN  Outcome: Progressing     Problem: ABCDS Injury Assessment  Goal: Absence of physical injury  Outcome: Progressing

## 2023-12-06 NOTE — GROUP NOTE
Group Therapy Note    Date: 12/6/2023    Group Start Time: 1020  Group End Time: 1050  Group Topic: Cognitive Skills    [facility]    Ricco Turner        Group Therapy Note    Attendees: 6       Patient's Goal:  Pt will be able to increase understanding of self-care. Notes:  Pt was an active participant. Status After Intervention:  Improved    Participation Level:  Active Listener and Interactive    Participation Quality: Appropriate, Attentive, and Sharing      Speech:  normal      Thought Process/Content: Logical  Linear      Affective Functioning: Congruent      Mood:  cooperative       Level of consciousness:  Alert, Oriented x4, and Attentive      Response to Learning: Able to verbalize current knowledge/experience, Able to verbalize/acknowledge new learning, Able to retain information, Capable of insight, and Progressing to goal      Endings: None Reported    Modes of Intervention: Education, Support, Socialization, Exploration, Clarifying, and Problem-solving      Discipline Responsible: Social Work Intern      Signature:  Ricco Turner BSW,LSW Social Work Intern    [facility] MSW,LSW

## 2023-12-06 NOTE — DISCHARGE SUMMARY
DISCHARGE SUMMARY      Patient ID:  Hung Chandler  [id number]  77 y.o.  1955    Admit date: 12/4/2023    Discharge date and time: 12/6/2023    Admitting Physician: Uli Burger MD     Discharge Physician: Dr Mallika Galo MD    Discharge Diagnoses:   Patient Active Problem List   Diagnosis    Class 2 obesity due to excess calories with serious comorbidity and body mass index (BMI) of 38.0 to 38.9 in adult    Hyperlipidemia    Essential hypertension    Hypothyroidism    Arthritis of shoulder region, left    OA (osteoarthritis of the spine)    NIKO (obstructive sleep apnea)    Restrictive lung disease secondary to obesity    Persistent atrial fibrillation (HCC)    Chronic obstructive pulmonary disease, unspecified COPD type ([facility])    Acute heart failure with preserved ejection fraction (HFpEF) (HCC)    Normocytic anemia    Stage 3b chronic kidney disease (HCC)    Hypercalcemia    Proteinuria due to type 2 diabetes mellitus (HCC)    Neoplasm of soft tissue    Gout    Chronic systolic (congestive) heart failure    Type 2 diabetes mellitus with diabetic neuropathy, with long-term current use of insulin (HCC)    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 \"  But is adamant that he would never harm himself. He was pink slipped in the ER for suicidal thoughts with access to guns.         PAST MEDICAL/PSYCHIATRIC HISTORY:   Past Medical History:   Diagnosis Date    RADHA (acute kidney injury) ([address] ) 3/10/2022    Atrial and other work-up, as clinically indicated. Risks, benefits, side effects, drug-to-drug interactions and alternatives to treatment were discussed. Encourage patient to attend outpatient follow up appointment and therapy, appointment scheduled prior to discharge. Patient was advised to call the outpatient provider, visit the nearest ED or call 911 if symptoms are not manageable. Patient's family member was contacted prior to the discharge, patient discharged home and psychiatrically stable condition. Medication List        START taking these medications      sertraline 50 MG tablet  Commonly known as: ZOLOFT  Take 1 tablet by mouth daily  Start taking on: December 7, 2023            CHANGE how you take these medications      levothyroxine 88 MCG tablet  Commonly known as: SYNTHROID  What changed: Another medication with the same name was removed. Continue taking this medication, and follow the directions you see here. CONTINUE taking these medications      allopurinol 100 MG tablet  Commonly known as: ZYLOPRIM  Take 2 tablets by mouth daily     aspirin 81 MG EC tablet     atorvastatin 40 MG tablet  Commonly known as: LIPITOR  TAKE ONE TABLET BY MOUTH DAILY     bumetanide 2 MG tablet  Commonly known as: BUMEX     Eliquis 5 MG Tabs tablet  Generic drug: apixaban  TAKE ONE TABLET BY MOUTH TWO TIMES A DAY     ferrous sulfate 325 (65 Fe) MG tablet  Commonly known as: IRON 325  Take 1 tablet by mouth every other day     Lancets Misc  Give Delica lancets. Tests twice a day A.M. And P.M     metoprolol succinate 25 MG extended release tablet  Commonly known as: TOPROL XL  Take 1 tablet by mouth daily     Misc. Devices Misc  Please add portability to current O2 order. Resp to evaluate patient for OCD device.      OneTouch Verio strip  Generic drug: blood glucose test strips  TEST IN THE MORNING AND IN THE EVENING     * Pen Needles 32G X 6 MM Misc  Take insulin daily     * Pen Needles 31G X 6 MM

## 2023-12-07 ENCOUNTER — TELEPHONE (OUTPATIENT)
Dept: INTERNAL MEDICINE | Age: 68
End: 2023-12-07

## 2023-12-07 NOTE — TELEPHONE ENCOUNTER
Patient called stating that he had an episode where he could not stop shaking and felt like he was losing his mind came here but could not be seen so went to [facility] who he told him that his pain gets so bad at times that he thinks about shooting himself in the head  so spent 3 days in the psych unit and it was suggested to him thre to have his pcp look at him medications to see id any of those could be to blame  states that he is feeling good today but would like a doctor to call him today as his wife is off today and can help him with what meds he is taking

## 2023-12-07 NOTE — TELEPHONE ENCOUNTER
Spoke to patient's wife, Petra, regarding concerns about his current medications causing him to have some suicidal ideations. Author personally reviewed medications, no such effect noted on his medications. Patient has been on these medications for a while and has been tolerating this before. They were reassured. All questions asked were answered.    Electronically signed by RUSSELL HERRERA MD on 12/7/2023 at 3:19 PM

## 2023-12-13 ENCOUNTER — HOSPITAL ENCOUNTER (OUTPATIENT)
Dept: OTHER | Age: 68
Setting detail: THERAPIES SERIES
Discharge: HOME | End: 2023-12-13
Payer: MEDICARE

## 2023-12-13 VITALS
HEART RATE: 70 BPM | RESPIRATION RATE: 18 BRPM | BODY MASS INDEX: 34.3 KG/M2 | OXYGEN SATURATION: 93 % | WEIGHT: 219 LBS | SYSTOLIC BLOOD PRESSURE: 122 MMHG | DIASTOLIC BLOOD PRESSURE: 59 MMHG

## 2023-12-13 LAB
ANION GAP SERPL CALCULATED.3IONS-SCNC: 14 MMOL/L (ref 7–16)
BNP SERPL-MCNC: 2452 PG/ML (ref 0–125)
BUN SERPL-MCNC: 24 MG/DL (ref 6–23)
CALCIUM SERPL-MCNC: 9.2 MG/DL (ref 8.6–10.2)
CHLORIDE SERPL-SCNC: 102 MMOL/L (ref 98–107)
CO2 SERPL-SCNC: 20 MMOL/L (ref 22–29)
CREAT SERPL-MCNC: 1.3 MG/DL (ref 0.7–1.2)
GFR SERPL CREATININE-BSD FRML MDRD: >60 ML/MIN/1.73M2
GLUCOSE SERPL-MCNC: 127 MG/DL (ref 74–99)
POTASSIUM SERPL-SCNC: 4.2 MMOL/L (ref 3.5–5)
SODIUM SERPL-SCNC: 136 MMOL/L (ref 132–146)

## 2023-12-13 PROCEDURE — 83880 ASSAY OF NATRIURETIC PEPTIDE: CPT

## 2023-12-13 PROCEDURE — 80048 BASIC METABOLIC PNL TOTAL CA: CPT

## 2023-12-13 PROCEDURE — 99214 OFFICE O/P EST MOD 30 MIN: CPT

## 2023-12-13 ASSESSMENT — PATIENT HEALTH QUESTIONNAIRE - PHQ9
1. LITTLE INTEREST OR PLEASURE IN DOING THINGS: SEVERAL DAYS
3. TROUBLE FALLING OR STAYING ASLEEP: NOT AT ALL
10. IF YOU CHECKED OFF ANY PROBLEMS, HOW DIFFICULT HAVE THESE PROBLEMS MADE IT FOR YOU TO DO YOUR WORK, TAKE CARE OF THINGS AT HOME, OR GET ALONG WITH OTHER PEOPLE: SOMEWHAT DIFFICULT
6. FEELING BAD ABOUT YOURSELF - OR THAT YOU ARE A FAILURE OR HAVE LET YOURSELF OR YOUR FAMILY DOWN: SEVERAL DAYS
7. TROUBLE CONCENTRATING ON THINGS, SUCH AS READING THE NEWSPAPER OR WATCHING TELEVISION: SEVERAL DAYS
8. MOVING OR SPEAKING SO SLOWLY THAT OTHER PEOPLE COULD HAVE NOTICED. OR THE OPPOSITE, BEING SO FIGETY OR RESTLESS THAT YOU HAVE BEEN MOVING AROUND A LOT MORE THAN USUAL: NOT AT ALL
2. FEELING DOWN, DEPRESSED OR HOPELESS: SEVERAL DAYS
SUM OF ALL RESPONSES TO PHQ9 QUESTIONS 1 & 2: 2
4. FEELING TIRED OR HAVING LITTLE ENERGY: SEVERAL DAYS
5. POOR APPETITE OR OVEREATING: SEVERAL DAYS

## 2023-12-13 NOTE — PLAN OF CARE
Problem: Chronic Conditions and Co-morbidities  Goal: Patient's chronic conditions and co-morbidity symptoms are monitored and maintained or improved  Flowsheets (Taken 12/13/2023 3047)  Care Plan - Patient's Chronic Conditions and Co-Morbidity Symptoms are Monitored and Maintained or Improved: Monitor and assess patient's chronic conditions and comorbid symptoms for stability, deterioration, or improvement

## 2023-12-13 NOTE — PROGRESS NOTES
Alkaline Phosphatase (U/L)   Date Value   11/23/2023 133 (H)     INR:  INR (no units)   Date Value   12/24/2022 2.1         Wt Readings from Last 3 Encounters:   12/13/23 99.3 kg (219 lb)   12/04/23 103.4 kg (228 lb)   11/28/23 101.6 kg (224 lb)           ASSESSMENT/PLAN:    [] Euvolemic with no JVD/HJR, lungs clear, no abdominal bloating, no lower extremity edema and stable weights with good urinary response to oral diuretic          [x] Hypervolemic, with increased from baseline:  [x] Shortness of breath/BALL  [] JVD  [] HJR  [] Abnormal lung assessment:   [] Orthopnea  [] PND  [] Decreased urinary response to oral diuretic   [] Scrotal swelling   [x] Lower extremity edema  [x] Compression stockings (has at home )  [] Decline in functional capacity (unable to perform activities they had previously been able to do)  [] Weight gain   [x] IV diuretics given No   [] Provider notified of recurrent IV diuretic use    [x]Lab work obtained    [x] Patient/Family Educated On:  [x] HF zones (Green, Yellow, Red) and aware of when to take action   [x] Daily weights  [] Scale provided   [x] Low sodium diet (2000 mg)  Barriers to compliance  [] Refuses to monitor diet  [] Socioeconomic difficulties  [] Unable to cook for self (use of frozen meals, can goods, etc)  [] CHF CHW consulted  [] Low sodium meal delivery options given to patient  [] Dietician consulted   [] Low sodium recipes provided  [] Sodium free seasoning provided   [x] Fluid intake (64 oz)  [x] Reviewed currently prescribed medications with patient, educated on importance of compliance and answered any questions regarding their medication  [] Pill box provided to patient  [] Patient using pill packing pharmacy   [] CPAP/BiPAP use  [] Low impact exercise / cardiac rehab   [] LifeVest use  [x] Patient aware of signs and symptoms of worsening HF, CHF clinic phone number provided and made aware to call clinic for sooner if evaluation if needed     [] Difficulty

## 2023-12-14 ENCOUNTER — CLINICAL DOCUMENTATION (OUTPATIENT)
Dept: INTERNAL MEDICINE | Age: 68
End: 2023-12-14

## 2023-12-14 ENCOUNTER — OFFICE VISIT (OUTPATIENT)
Dept: INTERNAL MEDICINE | Age: 68
End: 2023-12-14
Payer: MEDICARE

## 2023-12-14 VITALS
HEIGHT: 67 IN | RESPIRATION RATE: 20 BRPM | OXYGEN SATURATION: 93 % | HEART RATE: 75 BPM | DIASTOLIC BLOOD PRESSURE: 57 MMHG | WEIGHT: 215.4 LBS | BODY MASS INDEX: 33.81 KG/M2 | TEMPERATURE: 97.2 F | SYSTOLIC BLOOD PRESSURE: 119 MMHG

## 2023-12-14 DIAGNOSIS — I48.19 PERSISTENT ATRIAL FIBRILLATION (HCC): Chronic | ICD-10-CM

## 2023-12-14 DIAGNOSIS — F33.0 MAJOR DEPRESSIVE DISORDER, RECURRENT, MILD (HCC): ICD-10-CM

## 2023-12-14 DIAGNOSIS — F32.9 CURRENT EPISODE OF MAJOR DEPRESSIVE DISORDER WITHOUT PRIOR EPISODE, UNSPECIFIED DEPRESSION EPISODE SEVERITY: Primary | ICD-10-CM

## 2023-12-14 DIAGNOSIS — C49.20: ICD-10-CM

## 2023-12-14 DIAGNOSIS — Z79.4 TYPE 2 DIABETES MELLITUS WITH DIABETIC AUTONOMIC NEUROPATHY, WITH LONG-TERM CURRENT USE OF INSULIN (HCC): ICD-10-CM

## 2023-12-14 DIAGNOSIS — M1A.9XX0 CHRONIC GOUT WITHOUT TOPHUS, UNSPECIFIED CAUSE, UNSPECIFIED SITE: ICD-10-CM

## 2023-12-14 DIAGNOSIS — I50.22 CHRONIC SYSTOLIC (CONGESTIVE) HEART FAILURE (HCC): ICD-10-CM

## 2023-12-14 DIAGNOSIS — F33.41 RECURRENT MAJOR DEPRESSIVE DISORDER, IN PARTIAL REMISSION (HCC): ICD-10-CM

## 2023-12-14 DIAGNOSIS — E11.69 DIABETES MELLITUS TYPE 2 IN OBESE (HCC): ICD-10-CM

## 2023-12-14 DIAGNOSIS — D64.9 ANEMIA, UNSPECIFIED TYPE: ICD-10-CM

## 2023-12-14 DIAGNOSIS — E66.9 DIABETES MELLITUS TYPE 2 IN OBESE (HCC): ICD-10-CM

## 2023-12-14 DIAGNOSIS — F33.1 MAJOR DEPRESSIVE DISORDER, RECURRENT, MODERATE (HCC): ICD-10-CM

## 2023-12-14 DIAGNOSIS — E11.43 TYPE 2 DIABETES MELLITUS WITH DIABETIC AUTONOMIC NEUROPATHY, WITH LONG-TERM CURRENT USE OF INSULIN (HCC): ICD-10-CM

## 2023-12-14 PROBLEM — F33.9 MAJOR DEPRESSIVE DISORDER, RECURRENT, UNSPECIFIED (HCC): Status: ACTIVE | Noted: 2023-12-14

## 2023-12-14 PROCEDURE — 99212 OFFICE O/P EST SF 10 MIN: CPT | Performed by: CHIROPRACTOR

## 2023-12-14 PROCEDURE — 1123F ACP DISCUSS/DSCN MKR DOCD: CPT | Performed by: CHIROPRACTOR

## 2023-12-14 PROCEDURE — 99213 OFFICE O/P EST LOW 20 MIN: CPT | Performed by: CHIROPRACTOR

## 2023-12-14 PROCEDURE — 3074F SYST BP LT 130 MM HG: CPT | Performed by: CHIROPRACTOR

## 2023-12-14 PROCEDURE — 3052F HG A1C>EQUAL 8.0%<EQUAL 9.0%: CPT | Performed by: CHIROPRACTOR

## 2023-12-14 PROCEDURE — 3078F DIAST BP <80 MM HG: CPT | Performed by: INTERNAL MEDICINE

## 2023-12-14 RX ORDER — INSULIN GLARGINE 100 [IU]/ML
25 INJECTION, SOLUTION SUBCUTANEOUS 2 TIMES DAILY
Qty: 20 ADJUSTABLE DOSE PRE-FILLED PEN SYRINGE | Refills: 0 | Status: SHIPPED | OUTPATIENT
Start: 2023-12-14

## 2023-12-14 RX ORDER — ATORVASTATIN CALCIUM 40 MG/1
TABLET, FILM COATED ORAL
Qty: 90 TABLET | Refills: 1 | Status: SHIPPED | OUTPATIENT
Start: 2023-12-14

## 2023-12-14 RX ORDER — METOPROLOL SUCCINATE 25 MG/1
25 TABLET, EXTENDED RELEASE ORAL DAILY
Qty: 90 TABLET | Refills: 1 | Status: SHIPPED | OUTPATIENT
Start: 2023-12-14

## 2023-12-14 RX ORDER — ALLOPURINOL 100 MG/1
200 TABLET ORAL DAILY
Qty: 180 TABLET | Refills: 1 | Status: SHIPPED | OUTPATIENT
Start: 2023-12-14

## 2023-12-14 RX ORDER — FERROUS SULFATE 325(65) MG
325 TABLET ORAL EVERY OTHER DAY
Qty: 45 TABLET | Refills: 1 | Status: SHIPPED | OUTPATIENT
Start: 2023-12-14

## 2023-12-14 RX ORDER — PEN NEEDLE, DIABETIC 31 G X1/4"
NEEDLE, DISPOSABLE MISCELLANEOUS
Qty: 100 EACH | Refills: 3 | Status: SHIPPED | OUTPATIENT
Start: 2023-12-14

## 2023-12-14 NOTE — PATIENT INSTRUCTIONS
Dear Sanna Rocha,        Thank you for coming to your appointment today. I hope we have addressed all of your needs. Please make sure to do the following:  - Continue your medications as listed. Continue the Zoloft (antidepressant medication) with the current dose. - We will see each other again in January         Have a great day!         Sincerely,  Pj Salcedo MD  12/14/2023  3:32 PM

## 2023-12-14 NOTE — PROGRESS NOTES
ABHI met with pt to discuss options for counseling services. Pt reports he feels like his depression is in control and reported no additional concerns. [facility] [place] provided contact information if pt ever needed further assistance or counseling.

## 2023-12-16 PROCEDURE — 93294 REM INTERROG EVL PM/LDLS PM: CPT | Performed by: INTERNAL MEDICINE

## 2023-12-16 PROCEDURE — 93296 REM INTERROG EVL PM/IDS: CPT | Performed by: INTERNAL MEDICINE

## 2023-12-24 DIAGNOSIS — E66.9 DIABETES MELLITUS TYPE 2 IN OBESE (HCC): ICD-10-CM

## 2023-12-24 DIAGNOSIS — E11.69 DIABETES MELLITUS TYPE 2 IN OBESE (HCC): ICD-10-CM

## 2023-12-26 RX ORDER — INSULIN GLARGINE 100 [IU]/ML
INJECTION, SOLUTION SUBCUTANEOUS
Qty: 75 ML | Refills: 0 | OUTPATIENT
Start: 2023-12-26

## 2024-01-03 ENCOUNTER — HOSPITAL ENCOUNTER (OUTPATIENT)
Dept: OTHER | Age: 69
Setting detail: THERAPIES SERIES
Discharge: HOME | End: 2024-01-03
Payer: MEDICARE

## 2024-01-03 VITALS
WEIGHT: 195 LBS | BODY MASS INDEX: 30.54 KG/M2 | DIASTOLIC BLOOD PRESSURE: 56 MMHG | SYSTOLIC BLOOD PRESSURE: 114 MMHG | HEART RATE: 66 BPM | OXYGEN SATURATION: 95 % | RESPIRATION RATE: 18 BRPM

## 2024-01-03 LAB
ANION GAP SERPL CALCULATED.3IONS-SCNC: 13 MMOL/L (ref 7–16)
BNP SERPL-MCNC: 1468 PG/ML (ref 0–125)
BUN SERPL-MCNC: 36 MG/DL (ref 6–23)
CALCIUM SERPL-MCNC: 10.4 MG/DL (ref 8.6–10.2)
CHLORIDE SERPL-SCNC: 101 MMOL/L (ref 98–107)
CO2 SERPL-SCNC: 27 MMOL/L (ref 22–29)
CREAT SERPL-MCNC: 1.4 MG/DL (ref 0.7–1.2)
GFR SERPL CREATININE-BSD FRML MDRD: 55 ML/MIN/1.73M2
GLUCOSE SERPL-MCNC: 126 MG/DL (ref 74–99)
POTASSIUM SERPL-SCNC: 4.1 MMOL/L (ref 3.5–5)
SODIUM SERPL-SCNC: 141 MMOL/L (ref 132–146)

## 2024-01-03 PROCEDURE — 83880 ASSAY OF NATRIURETIC PEPTIDE: CPT

## 2024-01-03 PROCEDURE — 80048 BASIC METABOLIC PNL TOTAL CA: CPT

## 2024-01-03 PROCEDURE — 99214 OFFICE O/P EST MOD 30 MIN: CPT

## 2024-01-03 NOTE — PLAN OF CARE
Problem: Chronic Conditions and Co-morbidities  Goal: Patient's chronic conditions and co-morbidity symptoms are monitored and maintained or improved  Outcome: Progressing     Problem: Discharge Planning  Goal: Discharge to home or other facility with appropriate resources  Outcome: Progressing

## 2024-01-03 NOTE — PROGRESS NOTES
ALT (U/L)   Date Value   12/21/2023 23     Total Bilirubin (mg/dL)   Date Value   12/21/2023 1.5 (H)     Alkaline Phosphatase (U/L)   Date Value   12/21/2023 134 (H)     INR:  INR (no units)   Date Value   12/24/2022 2.1         Wt Readings from Last 3 Encounters:   01/03/24 88.5 kg (195 lb)   12/14/23 97.7 kg (215 lb 6.4 oz)   12/13/23 99.3 kg (219 lb)           ASSESSMENT/PLAN:    [x] Euvolemic          [] Hypervolemic, with increased from baseline:  [] Shortness of breath/BALL  [] JVD  [] HJR  [] Abnormal lung assessment:   [] Orthopnea  [] PND  [] Decreased urinary response to oral diuretic   [] Scrotal swelling   [] Lower extremity edema  [] Compression stockings (has at home )  [] Decline in functional capacity (unable to perform activities they had previously been able to do)  [] Weight gain   [x] IV diuretics given No   [] Provider notified of recurrent IV diuretic use    [x]Lab work obtained    [x] Patient/Family Educated On:  [x] HF zones (Green, Yellow, Red) and aware of when to take action   [x] Daily weights  [] Scale provided   [x] Low sodium diet (2000 mg)  Barriers to compliance  [] Refuses to monitor diet  [] Socioeconomic difficulties  [] Unable to cook for self (use of frozen meals, can goods, etc)  [] CHF CHW consulted  [] Low sodium meal delivery options given to patient  [] Dietician consulted   [] Low sodium recipes provided  [] Sodium free seasoning provided   [x] Fluid intake (64 oz)  [x] Reviewed currently prescribed medications with patient, educated on importance of compliance and answered any questions regarding their medication  [] Pill box provided to patient  [] Patient using pill packing pharmacy   [] CPAP/BiPAP use  [] Low impact exercise / cardiac rehab   [] LifeVest use  [x] Patient aware of signs and symptoms of worsening HF, CHF clinic phone number provided and made aware to call clinic for sooner if evaluation if needed     [] Difficulty affording medications  [] CHF CHW

## 2024-01-11 ENCOUNTER — TELEPHONE (OUTPATIENT)
Dept: NON INVASIVE DIAGNOSTICS | Age: 69
End: 2024-01-11

## 2024-01-24 ENCOUNTER — APPOINTMENT (OUTPATIENT)
Dept: OTHER | Age: 69
End: 2024-01-24
Payer: MEDICARE

## 2024-01-30 ENCOUNTER — HOSPITAL ENCOUNTER (OUTPATIENT)
Dept: OTHER | Age: 69
Setting detail: THERAPIES SERIES
Discharge: HOME | End: 2024-01-30
Payer: MEDICARE

## 2024-01-30 VITALS
BODY MASS INDEX: 32.26 KG/M2 | DIASTOLIC BLOOD PRESSURE: 62 MMHG | RESPIRATION RATE: 18 BRPM | SYSTOLIC BLOOD PRESSURE: 128 MMHG | OXYGEN SATURATION: 95 % | WEIGHT: 206 LBS | HEART RATE: 70 BPM

## 2024-01-30 DIAGNOSIS — I48.19 PERSISTENT ATRIAL FIBRILLATION (HCC): Chronic | ICD-10-CM

## 2024-01-30 LAB
ANION GAP SERPL CALCULATED.3IONS-SCNC: 12 MMOL/L (ref 7–16)
BNP SERPL-MCNC: 2097 PG/ML (ref 0–125)
BUN SERPL-MCNC: 31 MG/DL (ref 6–23)
CALCIUM SERPL-MCNC: 9.6 MG/DL (ref 8.6–10.2)
CHLORIDE SERPL-SCNC: 99 MMOL/L (ref 98–107)
CO2 SERPL-SCNC: 26 MMOL/L (ref 22–29)
CREAT SERPL-MCNC: 1.3 MG/DL (ref 0.7–1.2)
GFR SERPL CREATININE-BSD FRML MDRD: >60 ML/MIN/1.73M2
GLUCOSE SERPL-MCNC: 156 MG/DL (ref 74–99)
POTASSIUM SERPL-SCNC: 4.5 MMOL/L (ref 3.5–5)
SODIUM SERPL-SCNC: 137 MMOL/L (ref 132–146)

## 2024-01-30 PROCEDURE — 83880 ASSAY OF NATRIURETIC PEPTIDE: CPT

## 2024-01-30 PROCEDURE — 80048 BASIC METABOLIC PNL TOTAL CA: CPT

## 2024-01-30 PROCEDURE — 99214 OFFICE O/P EST MOD 30 MIN: CPT

## 2024-01-30 PROCEDURE — 2580000003 HC RX 258: Performed by: NURSE PRACTITIONER

## 2024-01-30 PROCEDURE — 6360000002 HC RX W HCPCS: Performed by: NURSE PRACTITIONER

## 2024-01-30 PROCEDURE — 96374 THER/PROPH/DIAG INJ IV PUSH: CPT

## 2024-01-30 RX ORDER — BUMETANIDE 2 MG/1
2 TABLET ORAL DAILY
Qty: 30 TABLET | Refills: 3 | Status: SHIPPED | OUTPATIENT
Start: 2024-01-30

## 2024-01-30 RX ORDER — SODIUM CHLORIDE 0.9 % (FLUSH) 0.9 %
10 SYRINGE (ML) INJECTION 2 TIMES DAILY
Status: DISCONTINUED | OUTPATIENT
Start: 2024-01-30 | End: 2024-01-31 | Stop reason: HOSPADM

## 2024-01-30 RX ORDER — FUROSEMIDE 10 MG/ML
40 INJECTION INTRAMUSCULAR; INTRAVENOUS ONCE
Status: COMPLETED | OUTPATIENT
Start: 2024-01-30 | End: 2024-01-30

## 2024-01-30 RX ADMIN — SODIUM CHLORIDE, PRESERVATIVE FREE 10 ML: 5 INJECTION INTRAVENOUS at 09:08

## 2024-01-30 RX ADMIN — FUROSEMIDE 40 MG: 10 INJECTION, SOLUTION INTRAMUSCULAR; INTRAVENOUS at 09:07

## 2024-01-30 NOTE — TELEPHONE ENCOUNTER
Last Appointment:  12/14/2023  Future Appointments   Date Time Provider Department Center   1/30/2024  9:00 AM [facility] CHF ROOM 1 [facility] CHF [facility]   2/12/2024 10:00 AM [facility]

## 2024-01-30 NOTE — PLAN OF CARE
Problem: Chronic Conditions and Co-morbidities  Goal: Patient's chronic conditions and co-morbidity symptoms are monitored and maintained or improved  Flowsheets (Taken 1/30/2024 3948)  Care Plan - Patient's Chronic Conditions and Co-Morbidity Symptoms are Monitored and Maintained or Improved: Monitor and assess patient's chronic conditions and comorbid symptoms for stability, deterioration, or improvement

## 2024-01-30 NOTE — RESULT ENCOUNTER NOTE
Labs and CHF clinic note reviewed  Please have patient change bumex to daily   Follow up in CHF clinic in 1 week     Thank you!

## 2024-01-30 NOTE — PROGRESS NOTES
Ethel Vickers, APRN - CNP  Nurse Practitioner  Specialty: Nurse Practitioner     Result Encounter Note     Signed     Date of Service: 1/30/2024  9:00 AM     Signed         Labs and CHF clinic note reviewed  Please have patient change bumex to daily   Follow up in CHF clinic in 1 week      Thank you!              Pt. And wife instructed on above orders, both verbalized understanding.

## 2024-01-30 NOTE — PROGRESS NOTES
Congestive Heart Failure Clinic   [facility]       Referring Provider: -    Primary Care Physician: Oly Crespo MD   Cardiologist: -DR. MERCER  Nephrologist: Dr. Sánchez      HISTORY OF PRESENT ILLNESS:     Wander Rocha is a 68 y.o. (1955) male with a history of HFpEF, most recent EF:  Lab Results   Component Value Date    LVEF 65 08/23/2022     He presents to the CHF clinic for ongoing evaluation and monitoring of heart failure.    In the CHF clinic today he denies any adverse symptoms except:  [x] Dizziness or lightheadedness   [] Syncope or near syncope  [] Recent Fall  [] Shortness of breath at rest   [x] Dyspnea with exertion  [] Decline in functional capacity (unable to perform activities they had previously been able to do)  [] Fatigue   [] Orthopnea  [] PND  [] Nocturnal cough  [] Hemoptysis  [] Chest pain, pressure, or discomfort  [] Palpitations  [] Abdominal distention  [x] Abdominal bloating  [] Early satiety  [] Blood in stool   [] Diarrhea  [] Constipation  [] Nausea/Vomiting  [] Decreased urinary response to oral diuretic   [] Scrotal swelling   [x] Lower extremity edema  [] Used PRN doses of oral diuretic   [x] Weight gain    Wt Readings from Last 3 Encounters:   01/30/24 93.4 kg (206 lb)   01/03/24 88.5 kg (195 lb)   12/14/23 97.7 kg (215 lb 6.4 oz)           SOCIAL HISTORY:  [x] Denies tobacco, alcohol or illicit drug abuse  [] Tobacco use:  [] ETOH use:  [] Illicit drug use:        MEDICATIONS:    No Known Allergies    Current Outpatient Medications:     allopurinol (ZYLOPRIM) 100 MG tablet, Take 2 tablets by mouth daily, Disp: 180 tablet, Rfl: 1    atorvastatin (LIPITOR) 40 MG tablet, TAKE ONE TABLET BY MOUTH DAILY, Disp: 90 tablet, Rfl: 1    ferrous sulfate (IRON 325) 325 (65 Fe) MG tablet, Take 1 tablet by mouth every other day, Disp: 45 tablet, Rfl: 1    insulin glargine (BASAGLAR KWIKPEN) 100 UNIT/ML injection pen, Inject 25 Units into the skin

## 2024-02-08 ENCOUNTER — HOSPITAL ENCOUNTER (OUTPATIENT)
Dept: OTHER | Age: 69
Setting detail: THERAPIES SERIES
Discharge: HOME | End: 2024-02-08
Payer: MEDICARE

## 2024-02-08 VITALS
OXYGEN SATURATION: 95 % | SYSTOLIC BLOOD PRESSURE: 125 MMHG | WEIGHT: 198 LBS | RESPIRATION RATE: 18 BRPM | DIASTOLIC BLOOD PRESSURE: 58 MMHG | HEART RATE: 78 BPM | BODY MASS INDEX: 31.01 KG/M2

## 2024-02-08 LAB
ANION GAP SERPL CALCULATED.3IONS-SCNC: 11 MMOL/L (ref 7–16)
BNP SERPL-MCNC: 1395 PG/ML (ref 0–125)
BUN SERPL-MCNC: 44 MG/DL (ref 6–23)
CALCIUM SERPL-MCNC: 8.8 MG/DL (ref 8.6–10.2)
CHLORIDE SERPL-SCNC: 101 MMOL/L (ref 98–107)
CO2 SERPL-SCNC: 25 MMOL/L (ref 22–29)
CREAT SERPL-MCNC: 1.3 MG/DL (ref 0.7–1.2)
GFR SERPL CREATININE-BSD FRML MDRD: >60 ML/MIN/1.73M2
GLUCOSE SERPL-MCNC: 254 MG/DL (ref 74–99)
POTASSIUM SERPL-SCNC: 4 MMOL/L (ref 3.5–5)
SODIUM SERPL-SCNC: 137 MMOL/L (ref 132–146)

## 2024-02-08 PROCEDURE — 2580000003 HC RX 258: Performed by: NURSE PRACTITIONER

## 2024-02-08 PROCEDURE — 80048 BASIC METABOLIC PNL TOTAL CA: CPT

## 2024-02-08 PROCEDURE — 6360000002 HC RX W HCPCS: Performed by: NURSE PRACTITIONER

## 2024-02-08 PROCEDURE — 96374 THER/PROPH/DIAG INJ IV PUSH: CPT

## 2024-02-08 PROCEDURE — 83880 ASSAY OF NATRIURETIC PEPTIDE: CPT

## 2024-02-08 PROCEDURE — 99214 OFFICE O/P EST MOD 30 MIN: CPT

## 2024-02-08 RX ORDER — SODIUM CHLORIDE 0.9 % (FLUSH) 0.9 %
5-40 SYRINGE (ML) INJECTION 2 TIMES DAILY
Status: DISCONTINUED | OUTPATIENT
Start: 2024-02-08 | End: 2024-02-09 | Stop reason: HOSPADM

## 2024-02-08 RX ORDER — FUROSEMIDE 10 MG/ML
40 INJECTION INTRAMUSCULAR; INTRAVENOUS ONCE
Status: COMPLETED | OUTPATIENT
Start: 2024-02-08 | End: 2024-02-08

## 2024-02-08 RX ADMIN — SODIUM CHLORIDE, PRESERVATIVE FREE 10 ML: 5 INJECTION INTRAVENOUS at 12:31

## 2024-02-08 RX ADMIN — FUROSEMIDE 40 MG: 10 INJECTION, SOLUTION INTRAMUSCULAR; INTRAVENOUS at 12:30

## 2024-02-08 NOTE — PROGRESS NOTES
Congestive Heart Failure Clinic   [facility]       Referring Provider: -    Primary Care Physician: Oly Crespo MD   Cardiologist: -DR. MERCER  Nephrologist: Dr. Sánchez      HISTORY OF PRESENT ILLNESS:     Wander Rocha is a 68 y.o. (1955) male with a history of HFpEF, most recent EF:  Lab Results   Component Value Date    LVEF 65 08/23/2022     He presents to the CHF clinic for ongoing evaluation and monitoring of heart failure.    In the CHF clinic today he denies any adverse symptoms except:  [] Dizziness or lightheadedness   [] Syncope or near syncope  [] Recent Fall  [] Shortness of breath at rest   [x] Dyspnea with exertion  [] Decline in functional capacity (unable to perform activities they had previously been able to do)  [x] Fatigue   [] Orthopnea  [] PND  [] Nocturnal cough  [] Hemoptysis  [] Chest pain, pressure, or discomfort  [] Palpitations  [] Abdominal distention  [] Abdominal bloating  [] Early satiety  [] Blood in stool   [] Diarrhea  [] Constipation  [] Nausea/Vomiting  [] Decreased urinary response to oral diuretic   [] Scrotal swelling   [] Lower extremity edema  [] Used PRN doses of oral diuretic   [] Weight gain    Wt Readings from Last 3 Encounters:   02/08/24 89.8 kg (198 lb)   01/30/24 93.4 kg (206 lb)   01/03/24 88.5 kg (195 lb)           SOCIAL HISTORY:  [x] Denies tobacco, alcohol or illicit drug abuse  [] Tobacco use:  [] ETOH use:  [] Illicit drug use:        MEDICATIONS:    No Known Allergies    Current Outpatient Medications:     apixaban (ELIQUIS) 5 MG TABS tablet, Take 1 tablet by mouth 2 times daily (Patient not taking: Reported on 2/8/2024), Disp: 60 tablet, Rfl: 2    bumetanide (BUMEX) 2 MG tablet, Take 1 tablet by mouth daily Mon-Wed-Fri (Patient taking differently: Take 1 tablet by mouth daily Patient was told to take daily at last CHF clinic appt. On 1/30/24), Disp: 30 tablet, Rfl: 3    allopurinol (ZYLOPRIM) 100 MG tablet,

## 2024-02-09 NOTE — RESULT ENCOUNTER NOTE
Labs and CHF clinic note reviewed  8lb weight loss with making bumex daily   Continues to eat diet high in sodium  Continue daily bumex   Follow up in CHF Clinic as scheduled - sooner if needed    Thank you

## 2024-02-12 ENCOUNTER — OFFICE VISIT (OUTPATIENT)
Dept: INTERNAL MEDICINE | Age: 69
End: 2024-02-12
Payer: MEDICARE

## 2024-02-12 ENCOUNTER — TELEPHONE (OUTPATIENT)
Dept: INTERNAL MEDICINE | Age: 69
End: 2024-02-12

## 2024-02-12 VITALS
WEIGHT: 194 LBS | RESPIRATION RATE: 18 BRPM | HEART RATE: 68 BPM | HEIGHT: 67 IN | BODY MASS INDEX: 30.45 KG/M2 | TEMPERATURE: 97.2 F | SYSTOLIC BLOOD PRESSURE: 110 MMHG | OXYGEN SATURATION: 97 % | DIASTOLIC BLOOD PRESSURE: 66 MMHG

## 2024-02-12 DIAGNOSIS — E11.43 TYPE 2 DIABETES MELLITUS WITH DIABETIC AUTONOMIC NEUROPATHY, WITH LONG-TERM CURRENT USE OF INSULIN (HCC): Primary | ICD-10-CM

## 2024-02-12 DIAGNOSIS — E11.40 TYPE 2 DIABETES MELLITUS WITH DIABETIC NEUROPATHY, WITH LONG-TERM CURRENT USE OF INSULIN (HCC): ICD-10-CM

## 2024-02-12 DIAGNOSIS — E03.9 HYPOTHYROIDISM, UNSPECIFIED TYPE: Chronic | ICD-10-CM

## 2024-02-12 DIAGNOSIS — Z79.4 TYPE 2 DIABETES MELLITUS WITH DIABETIC AUTONOMIC NEUROPATHY, WITH LONG-TERM CURRENT USE OF INSULIN (HCC): Primary | ICD-10-CM

## 2024-02-12 DIAGNOSIS — I50.22 CHRONIC SYSTOLIC (CONGESTIVE) HEART FAILURE (HCC): ICD-10-CM

## 2024-02-12 DIAGNOSIS — Z79.4 TYPE 2 DIABETES MELLITUS WITH DIABETIC NEUROPATHY, WITH LONG-TERM CURRENT USE OF INSULIN (HCC): ICD-10-CM

## 2024-02-12 DIAGNOSIS — D64.9 ANEMIA, UNSPECIFIED TYPE: ICD-10-CM

## 2024-02-12 DIAGNOSIS — Z79.4 TYPE 2 DIABETES MELLITUS WITH HYPERGLYCEMIA, WITH LONG-TERM CURRENT USE OF INSULIN (HCC): ICD-10-CM

## 2024-02-12 DIAGNOSIS — E11.65 TYPE 2 DIABETES MELLITUS WITH HYPERGLYCEMIA, WITH LONG-TERM CURRENT USE OF INSULIN (HCC): ICD-10-CM

## 2024-02-12 DIAGNOSIS — E11.69 DIABETES MELLITUS TYPE 2 IN OBESE (HCC): ICD-10-CM

## 2024-02-12 DIAGNOSIS — E66.9 DIABETES MELLITUS TYPE 2 IN OBESE (HCC): ICD-10-CM

## 2024-02-12 DIAGNOSIS — E78.5 HYPERLIPIDEMIA, UNSPECIFIED HYPERLIPIDEMIA TYPE: Chronic | ICD-10-CM

## 2024-02-12 DIAGNOSIS — F33.0 MAJOR DEPRESSIVE DISORDER, RECURRENT, MILD (HCC): ICD-10-CM

## 2024-02-12 DIAGNOSIS — M1A.9XX0 CHRONIC GOUT WITHOUT TOPHUS, UNSPECIFIED CAUSE, UNSPECIFIED SITE: ICD-10-CM

## 2024-02-12 DIAGNOSIS — I48.19 PERSISTENT ATRIAL FIBRILLATION (HCC): Chronic | ICD-10-CM

## 2024-02-12 LAB — HBA1C MFR BLD: 7.1 %

## 2024-02-12 PROCEDURE — 3051F HG A1C>EQUAL 7.0%<8.0%: CPT

## 2024-02-12 PROCEDURE — 3078F DIAST BP <80 MM HG: CPT

## 2024-02-12 PROCEDURE — 3074F SYST BP LT 130 MM HG: CPT

## 2024-02-12 PROCEDURE — 99214 OFFICE O/P EST MOD 30 MIN: CPT

## 2024-02-12 PROCEDURE — 83036 HEMOGLOBIN GLYCOSYLATED A1C: CPT

## 2024-02-12 PROCEDURE — 1123F ACP DISCUSS/DSCN MKR DOCD: CPT

## 2024-02-12 PROCEDURE — 99213 OFFICE O/P EST LOW 20 MIN: CPT

## 2024-02-12 RX ORDER — BLOOD SUGAR DIAGNOSTIC
STRIP MISCELLANEOUS
Qty: 200 EACH | Refills: 11 | Status: SHIPPED | OUTPATIENT
Start: 2024-02-12

## 2024-02-12 RX ORDER — ASPIRIN 81 MG/1
81 TABLET ORAL DAILY
Qty: 90 TABLET | Refills: 3 | Status: SHIPPED | OUTPATIENT
Start: 2024-02-12

## 2024-02-12 RX ORDER — ATORVASTATIN CALCIUM 40 MG/1
TABLET, FILM COATED ORAL
Qty: 90 TABLET | Refills: 3 | Status: SHIPPED | OUTPATIENT
Start: 2024-02-12

## 2024-02-12 RX ORDER — INSULIN GLARGINE 100 [IU]/ML
25 INJECTION, SOLUTION SUBCUTANEOUS 2 TIMES DAILY
Qty: 30 ML | Refills: 4 | Status: SHIPPED | OUTPATIENT
Start: 2024-02-12

## 2024-02-12 RX ORDER — BUMETANIDE 2 MG/1
2 TABLET ORAL
Qty: 45 TABLET | Refills: 2 | Status: SHIPPED | OUTPATIENT
Start: 2024-02-12

## 2024-02-12 RX ORDER — LEVOTHYROXINE SODIUM 100 MCG
100 TABLET ORAL DAILY
Qty: 90 TABLET | Refills: 1
Start: 2024-02-12

## 2024-02-12 RX ORDER — ACETAMINOPHEN 500 MG
1000 TABLET ORAL DAILY PRN
Qty: 180 TABLET | Refills: 3 | Status: SHIPPED | OUTPATIENT
Start: 2024-02-12

## 2024-02-12 RX ORDER — FERROUS SULFATE 325(65) MG
325 TABLET ORAL EVERY OTHER DAY
Qty: 45 TABLET | Refills: 3 | Status: SHIPPED | OUTPATIENT
Start: 2024-02-12

## 2024-02-12 RX ORDER — METOPROLOL SUCCINATE 25 MG/1
25 TABLET, EXTENDED RELEASE ORAL DAILY
Qty: 90 TABLET | Refills: 3 | Status: SHIPPED | OUTPATIENT
Start: 2024-02-12

## 2024-02-12 RX ORDER — ALLOPURINOL 100 MG/1
200 TABLET ORAL DAILY
Qty: 180 TABLET | Refills: 2 | Status: SHIPPED | OUTPATIENT
Start: 2024-02-12

## 2024-02-12 RX ORDER — LEVOTHYROXINE SODIUM 88 UG/1
88 TABLET ORAL DAILY
Qty: 90 TABLET | Refills: 3 | Status: CANCELLED | OUTPATIENT
Start: 2024-02-12

## 2024-02-12 RX ORDER — LANCETS 30 GAUGE
EACH MISCELLANEOUS
Qty: 200 EACH | Refills: 11 | Status: SHIPPED | OUTPATIENT
Start: 2024-02-12

## 2024-02-12 RX ORDER — PEN NEEDLE, DIABETIC 31 G X1/4"
NEEDLE, DISPOSABLE MISCELLANEOUS
Qty: 200 EACH | Refills: 11 | Status: SHIPPED
Start: 2024-02-12 | End: 2024-02-12

## 2024-02-12 RX ORDER — BLOOD SUGAR DIAGNOSTIC
STRIP MISCELLANEOUS
Qty: 100 EACH | Refills: 1
Start: 2024-02-12

## 2024-02-12 NOTE — PATIENT INSTRUCTIONS
Dear Wander Rocha,        Thank you for coming to your appointment today. I hope we have addressed all of your needs.       Please make sure to do the following:  - Continue your medications as listed.  -We increased your synthroid dose to 100 daily, a new script was sent to your pharmacy. Please have your blood drawn in 8 weeks to check your thyroid.   -Please provide urine sample and get lab draw for lipid panel to check your cholesterol prior to next appointment.   - We will see each other again in 3 months.     Call for a sooner appointment if needed.     Have a great day!        Sincerely,  Oly Crespo MD   2/12/2024  10:56 AM

## 2024-02-12 NOTE — PROGRESS NOTES
[facility]  Internal Medicine Residency Clinic    Attending Physician Statement  I have discussed the case, including pertinent history and exam findings with the resident physician.  I agree with the assessment, plan and orders as documented by the resident. I have reviewed all pertinent PMHx, PSHx, FamHx, SocialHx, medications, and allergies and updated history as appropriate.    Patient here for routine follow up of medical problems.   HTN  - on Toprol 25 mg daily.  Controlled here today.  No orthostatic symptoms.     Hyperlipidemia - on atorvastatin, 40 mg daily.     Hypothyroidsim - TSH has remained high.     Increase levothyroxine to 100 mcg daily.     DM - on insulin glargine, 25 Units twice daily. HbA1c is 7.1.    Continue current insulin at same dose.     A-fib - on Eliquis without bleeding.     Sinus node dysfunction - with pacemaker in place.  Denies any palpitations.     HFpEF - Sees them once every other week.  Monitors weight but with edema.  Does not wear compression stockings due to discomfort. Bumex three times weekly.      CKD - follows with Dr. Bass.  Last creatinine was 1.4.  GFR is 55.  Sees nephrology yearly.     Depression - in December was seen for SI, he was started on sertraline.  Reports sleeping well. Feels well.  No further HI/SI.            Remainder of medical problems as per resident note.    Beth Watson MD  2/12/2024 10:39 AM

## 2024-02-12 NOTE — TELEPHONE ENCOUNTER
Pharmacist from  in [place] called to change patient pen needles to his needles that insurance covers. Verbal order given for 32g x 6 mm. 100 each with 1 refill. Please advise.  
Hpi Title: Evaluation of Skin Lesions
How Severe Are Your Spot(S)?: mild

## 2024-02-12 NOTE — PROGRESS NOTES
[facility] - [facility] Internal Medicine      SUBJECTIVE:  Wander Rocha (:  1955) is a 68 y.o. male here for evaluation of the following chief complaint(s):  No chief complaint on file.    Previous Visit Imp Points: 12/15/2023 HFU for depression, SI, put on Zoloft 50 daily.     HPI: Pt here for 3 month f/u.     Afib on Eliquis  -No falls, bleeding/easy bruising, no blood in urine or stool.      Sinus node dysfunction s/p DCPM 2017   HFpEF   Follows with CHF clinic once weekly to once every 2 weeks  Cardiologist Dr. Ruggiero  Continues on Bumex 2mg MWF   No CP, SOB, cough, palpitations.   Significant pitting edema of lower extremities, has compression stockings does not use because they are uncomfortable. Discussed wear attempting to wear socks for a few hours daily with leg elevation and work up tolerance.     Hypothyroidism   On synthroid 88   TSH 13.28 on 2023, wife handles his medications and states he is taking it daily. No sxs.   Increased synthroid to 100, repeat TSH in 8 weeks.     HTN  Controlled on Toprol 25   No HA, blurred vision, CP.     HLD   Lipitor 40  Lipid panel due, ordered.      CKD   Dr. Bass 1 time per year.   1/3/2024 Cr 1.4, GFR 55     DMT T2, long-term insulin use   A1c 7.1%, 8.3 2023  Basaglar 25U BID, BG range from 120-160, no hypoglemia sxs.   DM foot exam done today with no abnormalities.   Podiatry, Sam, appointment scheduled for March.     Gout, controlled   Continues on allopurinol 200 daily   Urice acid 7.3 2023, cannot remember his last flare.     Hx of KENIA   Continues on iron supplement every other day   2023 hgb 11.8, stable. Last iron studies 2022, will repeat.     Major depressive disorder, admit to psych unit for SI 2023  Zoloft 50 daily. Previously met with [facility] at that time.   Well controlled, sleeping better, mood stable, no SI/HI, no anxiety or depression, has good outlook.     Health

## 2024-02-15 ENCOUNTER — HOSPITAL ENCOUNTER (OUTPATIENT)
Age: 69
Discharge: HOME | End: 2024-02-15
Payer: MEDICARE

## 2024-02-15 ENCOUNTER — APPOINTMENT (OUTPATIENT)
Dept: OTHER | Age: 69
End: 2024-02-15
Payer: MEDICARE

## 2024-02-15 DIAGNOSIS — Z79.4 TYPE 2 DIABETES MELLITUS WITH DIABETIC NEUROPATHY, WITH LONG-TERM CURRENT USE OF INSULIN (HCC): ICD-10-CM

## 2024-02-15 DIAGNOSIS — E78.5 HYPERLIPIDEMIA, UNSPECIFIED HYPERLIPIDEMIA TYPE: ICD-10-CM

## 2024-02-15 DIAGNOSIS — E11.40 TYPE 2 DIABETES MELLITUS WITH DIABETIC NEUROPATHY, WITH LONG-TERM CURRENT USE OF INSULIN (HCC): ICD-10-CM

## 2024-02-15 LAB
CHOLEST SERPL-MCNC: 124 MG/DL
CREAT UR-MCNC: 82.5 MG/DL (ref 40–278)
HDLC SERPL-MCNC: 22 MG/DL
LDLC SERPL CALC-MCNC: 68 MG/DL
MICROALBUMIN UR-MCNC: 21 MG/L (ref 0–19)
MICROALBUMIN/CREAT UR-RTO: 26 MCG/MG CREAT (ref 0–30)
TRIGL SERPL-MCNC: 171 MG/DL
VLDLC SERPL CALC-MCNC: 34 MG/DL

## 2024-02-15 PROCEDURE — 36415 COLL VENOUS BLD VENIPUNCTURE: CPT

## 2024-02-15 PROCEDURE — 80061 LIPID PANEL: CPT

## 2024-02-15 PROCEDURE — 82043 UR ALBUMIN QUANTITATIVE: CPT

## 2024-02-15 PROCEDURE — 82570 ASSAY OF URINE CREATININE: CPT

## 2024-02-29 ENCOUNTER — HOSPITAL ENCOUNTER (OUTPATIENT)
Dept: OTHER | Age: 69
Setting detail: THERAPIES SERIES
Discharge: HOME | End: 2024-02-29
Payer: MEDICARE

## 2024-02-29 VITALS
HEART RATE: 80 BPM | OXYGEN SATURATION: 96 % | SYSTOLIC BLOOD PRESSURE: 110 MMHG | DIASTOLIC BLOOD PRESSURE: 53 MMHG | WEIGHT: 207 LBS | BODY MASS INDEX: 32.42 KG/M2 | RESPIRATION RATE: 18 BRPM

## 2024-02-29 LAB
ANION GAP SERPL CALCULATED.3IONS-SCNC: 12 MMOL/L (ref 7–16)
BNP SERPL-MCNC: 1794 PG/ML (ref 0–125)
BUN SERPL-MCNC: 29 MG/DL (ref 6–23)
CALCIUM SERPL-MCNC: 9.1 MG/DL (ref 8.6–10.2)
CHLORIDE SERPL-SCNC: 99 MMOL/L (ref 98–107)
CO2 SERPL-SCNC: 26 MMOL/L (ref 22–29)
CREAT SERPL-MCNC: 1.4 MG/DL (ref 0.7–1.2)
GFR SERPL CREATININE-BSD FRML MDRD: 55 ML/MIN/1.73M2
GLUCOSE SERPL-MCNC: 175 MG/DL (ref 74–99)
POTASSIUM SERPL-SCNC: 4.3 MMOL/L (ref 3.5–5)
SODIUM SERPL-SCNC: 137 MMOL/L (ref 132–146)

## 2024-02-29 PROCEDURE — 99214 OFFICE O/P EST MOD 30 MIN: CPT

## 2024-02-29 PROCEDURE — 80048 BASIC METABOLIC PNL TOTAL CA: CPT

## 2024-02-29 PROCEDURE — 96374 THER/PROPH/DIAG INJ IV PUSH: CPT

## 2024-02-29 PROCEDURE — 83880 ASSAY OF NATRIURETIC PEPTIDE: CPT

## 2024-02-29 PROCEDURE — 6360000002 HC RX W HCPCS: Performed by: NURSE PRACTITIONER

## 2024-02-29 PROCEDURE — 2580000003 HC RX 258: Performed by: NURSE PRACTITIONER

## 2024-02-29 RX ORDER — SODIUM CHLORIDE 0.9 % (FLUSH) 0.9 %
10 SYRINGE (ML) INJECTION 2 TIMES DAILY
Status: DISCONTINUED | OUTPATIENT
Start: 2024-02-29 | End: 2024-03-01 | Stop reason: HOSPADM

## 2024-02-29 RX ORDER — FUROSEMIDE 10 MG/ML
40 INJECTION INTRAMUSCULAR; INTRAVENOUS ONCE
Status: COMPLETED | OUTPATIENT
Start: 2024-02-29 | End: 2024-02-29

## 2024-02-29 RX ADMIN — SODIUM CHLORIDE, PRESERVATIVE FREE 10 ML: 5 INJECTION INTRAVENOUS at 12:39

## 2024-02-29 RX ADMIN — FUROSEMIDE 40 MG: 10 INJECTION, SOLUTION INTRAMUSCULAR; INTRAVENOUS at 12:39

## 2024-02-29 NOTE — PLAN OF CARE
Problem: Chronic Conditions and Co-morbidities  Goal: Patient's chronic conditions and co-morbidity symptoms are monitored and maintained or improved  Flowsheets (Taken 2/29/2024 8364)  Care Plan - Patient's Chronic Conditions and Co-Morbidity Symptoms are Monitored and Maintained or Improved: Monitor and assess patient's chronic conditions and comorbid symptoms for stability, deterioration, or improvement

## 2024-02-29 NOTE — PROGRESS NOTES
Euvolemic          [x] Hypervolemic, with increased from baseline:  [] Shortness of breath/BALL  [] JVD  [] HJR  [x] Abnormal lung assessment:   [] Orthopnea  [] PND  [] Decreased urinary response to oral diuretic   [] Scrotal swelling   [x] Lower extremity edema  [] Compression stockings (has at home )  [] Decline in functional capacity (unable to perform activities they had previously been able to do)  [] Weight gain   [x] IV diuretics given IV LASIX 40 mg given for lower extremity edema and crackles to left base upon auscultation.  [] Provider notified of recurrent IV diuretic use    [x]Lab work obtained    [x] Patient/Family Educated On:  [x] HF zones (Green, Yellow, Red) and aware of when to take action   [x] Daily weights  [] Scale provided   [x] Low sodium diet (2000 mg)  Barriers to compliance  [] Refuses to monitor diet  [] Socioeconomic difficulties  [] Unable to cook for self (use of frozen meals, can goods, etc)  [] CHF CHW consulted  [] Low sodium meal delivery options given to patient  [] Dietician consulted   [] Low sodium recipes provided  [] Sodium free seasoning provided   [x] Fluid intake (64 oz)  [x] Reviewed currently prescribed medications with patient, educated on importance of compliance and answered any questions regarding their medication  [] Pill box provided to patient  [] Patient using pill packing pharmacy   [] CPAP/BiPAP use  [] Low impact exercise / cardiac rehab   [] LifeVest use  [x] Patient aware of signs and symptoms of worsening HF, CHF clinic phone number provided and made aware to call clinic for sooner if evaluation if needed     [] Difficulty affording medications  [] CHF CHW consulted  [] Prescription assistance information given   [] [facility] medication assistance program information given   [] Sample medications provided to patient to help bridge gap until affordability N/A      Additional Notes:   PT ADMITS HAS BEEN EATING OUT EVERY DAY , WIFE WAS OUT OF

## 2024-03-13 ENCOUNTER — HOSPITAL ENCOUNTER (OUTPATIENT)
Dept: OTHER | Age: 69
Setting detail: THERAPIES SERIES
Discharge: HOME | End: 2024-03-13
Payer: MEDICARE

## 2024-03-13 VITALS
SYSTOLIC BLOOD PRESSURE: 132 MMHG | BODY MASS INDEX: 32.89 KG/M2 | OXYGEN SATURATION: 94 % | RESPIRATION RATE: 18 BRPM | WEIGHT: 210 LBS | DIASTOLIC BLOOD PRESSURE: 62 MMHG | HEART RATE: 80 BPM

## 2024-03-13 DIAGNOSIS — Z79.4 TYPE 2 DIABETES MELLITUS WITH HYPERGLYCEMIA, WITH LONG-TERM CURRENT USE OF INSULIN (HCC): ICD-10-CM

## 2024-03-13 DIAGNOSIS — E11.65 TYPE 2 DIABETES MELLITUS WITH HYPERGLYCEMIA, WITH LONG-TERM CURRENT USE OF INSULIN (HCC): ICD-10-CM

## 2024-03-13 DIAGNOSIS — E03.9 HYPOTHYROIDISM, UNSPECIFIED TYPE: Chronic | ICD-10-CM

## 2024-03-13 LAB
ANION GAP SERPL CALCULATED.3IONS-SCNC: 13 MMOL/L (ref 7–16)
BNP SERPL-MCNC: 1738 PG/ML (ref 0–125)
BUN SERPL-MCNC: 33 MG/DL (ref 6–23)
CALCIUM SERPL-MCNC: 9.4 MG/DL (ref 8.6–10.2)
CHLORIDE SERPL-SCNC: 100 MMOL/L (ref 98–107)
CO2 SERPL-SCNC: 24 MMOL/L (ref 22–29)
CREAT SERPL-MCNC: 1.5 MG/DL (ref 0.7–1.2)
GFR SERPL CREATININE-BSD FRML MDRD: 51 ML/MIN/1.73M2
GLUCOSE SERPL-MCNC: 174 MG/DL (ref 74–99)
POTASSIUM SERPL-SCNC: 4.2 MMOL/L (ref 3.5–5)
SODIUM SERPL-SCNC: 137 MMOL/L (ref 132–146)

## 2024-03-13 PROCEDURE — 83880 ASSAY OF NATRIURETIC PEPTIDE: CPT

## 2024-03-13 PROCEDURE — 99214 OFFICE O/P EST MOD 30 MIN: CPT

## 2024-03-13 PROCEDURE — 2580000003 HC RX 258: Performed by: NURSE PRACTITIONER

## 2024-03-13 PROCEDURE — 80048 BASIC METABOLIC PNL TOTAL CA: CPT

## 2024-03-13 PROCEDURE — 6360000002 HC RX W HCPCS: Performed by: NURSE PRACTITIONER

## 2024-03-13 PROCEDURE — 96374 THER/PROPH/DIAG INJ IV PUSH: CPT

## 2024-03-13 RX ORDER — LEVOTHYROXINE SODIUM 100 MCG
100 TABLET ORAL DAILY
Qty: 90 TABLET | Refills: 1 | OUTPATIENT
Start: 2024-03-13

## 2024-03-13 RX ORDER — FUROSEMIDE 10 MG/ML
40 INJECTION INTRAMUSCULAR; INTRAVENOUS ONCE
Status: COMPLETED | OUTPATIENT
Start: 2024-03-13 | End: 2024-03-13

## 2024-03-13 RX ORDER — SODIUM CHLORIDE 0.9 % (FLUSH) 0.9 %
10 SYRINGE (ML) INJECTION PRN
Status: DISCONTINUED | OUTPATIENT
Start: 2024-03-13 | End: 2024-03-14 | Stop reason: HOSPADM

## 2024-03-13 RX ORDER — BLOOD SUGAR DIAGNOSTIC
STRIP MISCELLANEOUS
Qty: 100 EACH | Refills: 1 | OUTPATIENT
Start: 2024-03-13

## 2024-03-13 RX ADMIN — SODIUM CHLORIDE, PRESERVATIVE FREE 10 ML: 5 INJECTION INTRAVENOUS at 13:45

## 2024-03-13 RX ADMIN — FUROSEMIDE 40 MG: 10 INJECTION, SOLUTION INTRAMUSCULAR; INTRAVENOUS at 13:45

## 2024-03-13 NOTE — PLAN OF CARE
Problem: Chronic Conditions and Co-morbidities  Goal: Patient's chronic conditions and co-morbidity symptoms are monitored and maintained or improved  Flowsheets (Taken 3/13/2024 5733)  Care Plan - Patient's Chronic Conditions and Co-Morbidity Symptoms are Monitored and Maintained or Improved: Monitor and assess patient's chronic conditions and comorbid symptoms for stability, deterioration, or improvement

## 2024-03-14 NOTE — RESULT ENCOUNTER NOTE
Labs and CHF clinic note reviewed  Given high sodium diet and weight gain  Return bumex to daily   Follow up in CHF clinic as scheduled    Thank you

## 2024-03-15 NOTE — PROGRESS NOTES
Signed         Labs and CHF clinic note reviewed  Given high sodium diet and weight gain  Return bumex to daily   Follow up in CHF clinic as scheduled     Thank you                   Above instructions left on patient's VM, asking that he call back to confirm receipt of message and understanding.   
is up another 3 lbs. Patient admits he tries to watch his sodium but is not very successful. They never cook at home.   Takes diuretic 3 x a week as ordered.         Scheduled to follow up in CHF clinic on:   Future Appointments   Date Time Provider Department Center   3/28/2024  8:45 AM [facility] CHF ROOM 1 [facility]   5/28/2024 10:15 AM Oly Crespo MD [facility]

## 2024-03-16 PROCEDURE — 93294 REM INTERROG EVL PM/LDLS PM: CPT | Performed by: INTERNAL MEDICINE

## 2024-03-16 PROCEDURE — 93296 REM INTERROG EVL PM/IDS: CPT | Performed by: INTERNAL MEDICINE

## 2024-03-22 DIAGNOSIS — I50.22 CHRONIC SYSTOLIC (CONGESTIVE) HEART FAILURE (HCC): ICD-10-CM

## 2024-03-22 RX ORDER — BUMETANIDE 2 MG/1
2 TABLET ORAL
Qty: 45 TABLET | Refills: 2 | OUTPATIENT
Start: 2024-03-22

## 2024-03-22 NOTE — TELEPHONE ENCOUNTER
Last Appointment:  2/12/2024  Future Appointments   Date Time Provider Department Center   3/28/2024  8:45 AM [facility] CHF ROOM 1 [facility] CHF [facility]   5/28/2024 10:15 AM [facility]

## 2024-03-28 ENCOUNTER — HOSPITAL ENCOUNTER (OUTPATIENT)
Dept: OTHER | Age: 69
Setting detail: THERAPIES SERIES
Discharge: HOME | End: 2024-03-28
Payer: MEDICARE

## 2024-03-28 VITALS
BODY MASS INDEX: 32.11 KG/M2 | HEART RATE: 76 BPM | RESPIRATION RATE: 18 BRPM | DIASTOLIC BLOOD PRESSURE: 60 MMHG | SYSTOLIC BLOOD PRESSURE: 128 MMHG | WEIGHT: 205 LBS | OXYGEN SATURATION: 94 %

## 2024-03-28 LAB
ANION GAP SERPL CALCULATED.3IONS-SCNC: 11 MMOL/L (ref 7–16)
BNP SERPL-MCNC: 1739 PG/ML (ref 0–125)
BUN SERPL-MCNC: 47 MG/DL (ref 6–23)
CALCIUM SERPL-MCNC: 9.7 MG/DL (ref 8.6–10.2)
CHLORIDE SERPL-SCNC: 96 MMOL/L (ref 98–107)
CO2 SERPL-SCNC: 28 MMOL/L (ref 22–29)
CREAT SERPL-MCNC: 1.4 MG/DL (ref 0.7–1.2)
GFR SERPL CREATININE-BSD FRML MDRD: 53 ML/MIN/1.73M2
GLUCOSE SERPL-MCNC: 198 MG/DL (ref 74–99)
POTASSIUM SERPL-SCNC: 4.1 MMOL/L (ref 3.5–5)
SODIUM SERPL-SCNC: 135 MMOL/L (ref 132–146)

## 2024-03-28 PROCEDURE — 96374 THER/PROPH/DIAG INJ IV PUSH: CPT

## 2024-03-28 PROCEDURE — 99214 OFFICE O/P EST MOD 30 MIN: CPT

## 2024-03-28 PROCEDURE — 80048 BASIC METABOLIC PNL TOTAL CA: CPT

## 2024-03-28 PROCEDURE — 83880 ASSAY OF NATRIURETIC PEPTIDE: CPT

## 2024-03-28 NOTE — PROGRESS NOTES
Date Time Provider Department Center   4/8/2024 12:15 PM [facility]F ROOM 1 [facility]   5/28/2024 10:15 AM Oyl Crespo MD [facility]

## 2024-03-28 NOTE — PLAN OF CARE
Problem: Chronic Conditions and Co-morbidities  Goal: Patient's chronic conditions and co-morbidity symptoms are monitored and maintained or improved  Flowsheets (Taken 3/28/2024 1124)  Care Plan - Patient's Chronic Conditions and Co-Morbidity Symptoms are Monitored and Maintained or Improved: Monitor and assess patient's chronic conditions and comorbid symptoms for stability, deterioration, or improvement

## 2024-04-08 ENCOUNTER — HOSPITAL ENCOUNTER (OUTPATIENT)
Dept: OTHER | Age: 69
Setting detail: THERAPIES SERIES
Discharge: HOME | End: 2024-04-08

## 2024-04-19 ENCOUNTER — HOSPITAL ENCOUNTER (OUTPATIENT)
Dept: OTHER | Age: 69
Setting detail: THERAPIES SERIES
Discharge: HOME | End: 2024-04-19
Payer: MEDICARE

## 2024-04-19 VITALS
SYSTOLIC BLOOD PRESSURE: 134 MMHG | RESPIRATION RATE: 18 BRPM | HEART RATE: 80 BPM | OXYGEN SATURATION: 93 % | DIASTOLIC BLOOD PRESSURE: 60 MMHG | BODY MASS INDEX: 33.36 KG/M2 | WEIGHT: 213 LBS

## 2024-04-19 LAB
ANION GAP SERPL CALCULATED.3IONS-SCNC: 11 MMOL/L (ref 7–16)
BNP SERPL-MCNC: 2232 PG/ML (ref 0–125)
BUN SERPL-MCNC: 35 MG/DL (ref 6–23)
CALCIUM SERPL-MCNC: 9.2 MG/DL (ref 8.6–10.2)
CHLORIDE SERPL-SCNC: 98 MMOL/L (ref 98–107)
CO2 SERPL-SCNC: 25 MMOL/L (ref 22–29)
CREAT SERPL-MCNC: 1.3 MG/DL (ref 0.7–1.2)
GFR SERPL CREATININE-BSD FRML MDRD: 60 ML/MIN/1.73M2
GLUCOSE SERPL-MCNC: 183 MG/DL (ref 74–99)
POTASSIUM SERPL-SCNC: 4.5 MMOL/L (ref 3.5–5)
SODIUM SERPL-SCNC: 134 MMOL/L (ref 132–146)

## 2024-04-19 PROCEDURE — 2580000003 HC RX 258: Performed by: INTERNAL MEDICINE

## 2024-04-19 PROCEDURE — 96374 THER/PROPH/DIAG INJ IV PUSH: CPT

## 2024-04-19 PROCEDURE — 80048 BASIC METABOLIC PNL TOTAL CA: CPT

## 2024-04-19 PROCEDURE — 83880 ASSAY OF NATRIURETIC PEPTIDE: CPT

## 2024-04-19 PROCEDURE — 99214 OFFICE O/P EST MOD 30 MIN: CPT

## 2024-04-19 PROCEDURE — 6360000002 HC RX W HCPCS: Performed by: INTERNAL MEDICINE

## 2024-04-19 RX ORDER — FUROSEMIDE 10 MG/ML
40 INJECTION INTRAMUSCULAR; INTRAVENOUS ONCE
Status: COMPLETED | OUTPATIENT
Start: 2024-04-19 | End: 2024-04-19

## 2024-04-19 RX ORDER — SODIUM CHLORIDE 0.9 % (FLUSH) 0.9 %
5-40 SYRINGE (ML) INJECTION ONCE
Status: COMPLETED | OUTPATIENT
Start: 2024-04-19 | End: 2024-04-19

## 2024-04-19 RX ADMIN — FUROSEMIDE 40 MG: 10 INJECTION, SOLUTION INTRAMUSCULAR; INTRAVENOUS at 10:43

## 2024-04-19 RX ADMIN — SODIUM CHLORIDE, PRESERVATIVE FREE 10 ML: 5 INJECTION INTRAVENOUS at 10:43

## 2024-04-19 NOTE — PLAN OF CARE
Problem: Chronic Conditions and Co-morbidities  Goal: Patient's chronic conditions and co-morbidity symptoms are monitored and maintained or improved  Flowsheets (Taken 4/19/2024 1031)  Care Plan - Patient's Chronic Conditions and Co-Morbidity Symptoms are Monitored and Maintained or Improved: Monitor and assess patient's chronic conditions and comorbid symptoms for stability, deterioration, or improvement

## 2024-04-19 NOTE — RESULT ENCOUNTER NOTE
CHF clinic visit and labs reviewed   VS: 134/60, pulse 80  Weight up 8 lbs from 3/28  Given 40 mg IV Lasix     BNP up to 2232  Renal function improved, BUN 35, Cr 1.3  How is he feeling from a fluid standpoint after IV diuretic Friday?

## 2024-04-23 ENCOUNTER — TELEPHONE (OUTPATIENT)
Dept: OTHER | Age: 69
End: 2024-04-23

## 2024-04-23 NOTE — TELEPHONE ENCOUNTER
Patient returned call to clinic and left a voicemail stating that he fees good after receiving IVP diuretic on Friday.

## 2024-05-01 ENCOUNTER — HOSPITAL ENCOUNTER (OUTPATIENT)
Dept: OTHER | Age: 69
Setting detail: THERAPIES SERIES
Discharge: HOME | End: 2024-05-01
Payer: MEDICARE

## 2024-05-01 VITALS
HEART RATE: 70 BPM | BODY MASS INDEX: 31.48 KG/M2 | RESPIRATION RATE: 18 BRPM | WEIGHT: 201 LBS | DIASTOLIC BLOOD PRESSURE: 59 MMHG | OXYGEN SATURATION: 97 % | SYSTOLIC BLOOD PRESSURE: 120 MMHG

## 2024-05-01 LAB
ANION GAP SERPL CALCULATED.3IONS-SCNC: 14 MMOL/L (ref 7–16)
BNP SERPL-MCNC: 1073 PG/ML (ref 0–125)
BUN SERPL-MCNC: 32 MG/DL (ref 6–23)
CALCIUM SERPL-MCNC: 9.6 MG/DL (ref 8.6–10.2)
CHLORIDE SERPL-SCNC: 95 MMOL/L (ref 98–107)
CO2 SERPL-SCNC: 27 MMOL/L (ref 22–29)
CREAT SERPL-MCNC: 1.3 MG/DL (ref 0.7–1.2)
GFR, ESTIMATED: 61 ML/MIN/1.73M2
GLUCOSE SERPL-MCNC: 184 MG/DL (ref 74–99)
POTASSIUM SERPL-SCNC: 4.2 MMOL/L (ref 3.5–5)
SODIUM SERPL-SCNC: 136 MMOL/L (ref 132–146)

## 2024-05-01 PROCEDURE — 83880 ASSAY OF NATRIURETIC PEPTIDE: CPT

## 2024-05-01 PROCEDURE — 80048 BASIC METABOLIC PNL TOTAL CA: CPT

## 2024-05-01 PROCEDURE — 99214 OFFICE O/P EST MOD 30 MIN: CPT

## 2024-05-01 NOTE — PROGRESS NOTES
926cc reported during bladder scanner, Dr. Willa Gilford aware. Order for hayes received. Limits              Pulse: 70                 LAB DATA:  BMP:  Sodium (mmol/L)   Date Value   05/01/2024 136   04/19/2024 134   03/28/2024 135     Potassium (mmol/L)   Date Value   05/01/2024 4.2   04/19/2024 4.5   03/28/2024 4.1     Potassium reflex Magnesium (mmol/L)   Date Value   01/08/2023 5.1 (H)   01/08/2023 5.6 (H)   12/25/2022 6.5 (H)     Chloride (mmol/L)   Date Value   05/01/2024 95 (L)   04/19/2024 98   03/28/2024 96 (L)     CO2 (mmol/L)   Date Value   05/01/2024 27   04/19/2024 25   03/28/2024 28     BUN (mg/dL)   Date Value   05/01/2024 32 (H)   04/19/2024 35 (H)   03/28/2024 47 (H)     Creatinine (mg/dL)   Date Value   05/01/2024 1.3 (H)   04/19/2024 1.3 (H)   03/28/2024 1.4 (H)     Est, Glom Filt Rate (mL/min/1.73m2)   Date Value   05/01/2024 61   04/19/2024 60 (L)   03/28/2024 53 (L)   03/13/2024 51 (L)     Glucose (mg/dL)   Date Value   05/01/2024 184 (H)   04/19/2024 183 (H)   03/28/2024 198 (H)   04/18/2012 133 (H)   10/25/2011 138 (H)   01/28/2011 162 (H)     Calcium (mg/dL)   Date Value   05/01/2024 9.6   04/19/2024 9.2   03/28/2024 9.7     BNP:  Pro-BNP (pg/mL)   Date Value   05/01/2024 1,073 (H)   04/19/2024 2,232 (H)   03/28/2024 1,739 (H)      CBC:  WBC (k/uL)   Date Value   12/21/2023 4.7     Hemoglobin (g/dL)   Date Value   12/21/2023 11.8 (L)     Hematocrit (%)   Date Value   12/21/2023 36.8 (L)     Platelets (k/uL)   Date Value   12/21/2023 160     Iron Studies:  Ferritin (ng/mL)   Date Value   12/26/2022 125     Iron (mcg/dL)   Date Value   12/26/2022 38 (L)     TIBC (mcg/dL)   Date Value   12/26/2022 301     Hepatic:  AST (U/L)   Date Value   12/21/2023 26     ALT (U/L)   Date Value   12/21/2023 23     Total Bilirubin (mg/dL)   Date Value   12/21/2023 1.5 (H)     Alkaline Phosphatase (U/L)   Date Value   12/21/2023 134 (H)     INR:  INR (no units)   Date Value   12/24/2022 2.1         Wt Readings from Last 3 Encounters:   05/01/24 91.2 kg (201 lb)   04/19/24 96.6 kg (213 lb)

## 2024-05-01 NOTE — PLAN OF CARE
Problem: Chronic Conditions and Co-morbidities  Goal: Patient's chronic conditions and co-morbidity symptoms are monitored and maintained or improved  Flowsheets (Taken 5/1/2024 1155)  Care Plan - Patient's Chronic Conditions and Co-Morbidity Symptoms are Monitored and Maintained or Improved: Monitor and assess patient's chronic conditions and comorbid symptoms for stability, deterioration, or improvement

## 2024-05-15 ENCOUNTER — HOSPITAL ENCOUNTER (OUTPATIENT)
Dept: OTHER | Age: 69
Setting detail: THERAPIES SERIES
Discharge: HOME | End: 2024-05-15
Payer: MEDICARE

## 2024-05-15 VITALS
OXYGEN SATURATION: 93 % | DIASTOLIC BLOOD PRESSURE: 67 MMHG | BODY MASS INDEX: 33.05 KG/M2 | WEIGHT: 211 LBS | RESPIRATION RATE: 18 BRPM | SYSTOLIC BLOOD PRESSURE: 147 MMHG | HEART RATE: 71 BPM

## 2024-05-15 LAB
ANION GAP SERPL CALCULATED.3IONS-SCNC: 11 MMOL/L (ref 7–16)
BNP SERPL-MCNC: 1643 PG/ML (ref 0–125)
BUN SERPL-MCNC: 25 MG/DL (ref 6–23)
CALCIUM SERPL-MCNC: 9 MG/DL (ref 8.6–10.2)
CHLORIDE SERPL-SCNC: 100 MMOL/L (ref 98–107)
CO2 SERPL-SCNC: 26 MMOL/L (ref 22–29)
CREAT SERPL-MCNC: 1.2 MG/DL (ref 0.7–1.2)
GFR, ESTIMATED: 68 ML/MIN/1.73M2
GLUCOSE SERPL-MCNC: 181 MG/DL (ref 74–99)
POTASSIUM SERPL-SCNC: 4.2 MMOL/L (ref 3.5–5)
SODIUM SERPL-SCNC: 137 MMOL/L (ref 132–146)

## 2024-05-15 PROCEDURE — 96374 THER/PROPH/DIAG INJ IV PUSH: CPT

## 2024-05-15 PROCEDURE — 83880 ASSAY OF NATRIURETIC PEPTIDE: CPT

## 2024-05-15 PROCEDURE — 2580000003 HC RX 258: Performed by: INTERNAL MEDICINE

## 2024-05-15 PROCEDURE — 80048 BASIC METABOLIC PNL TOTAL CA: CPT

## 2024-05-15 PROCEDURE — 99214 OFFICE O/P EST MOD 30 MIN: CPT

## 2024-05-15 PROCEDURE — 6360000002 HC RX W HCPCS: Performed by: INTERNAL MEDICINE

## 2024-05-15 RX ORDER — SODIUM CHLORIDE 0.9 % (FLUSH) 0.9 %
5-40 SYRINGE (ML) INJECTION ONCE
Status: COMPLETED | OUTPATIENT
Start: 2024-05-15 | End: 2024-05-15

## 2024-05-15 RX ORDER — FUROSEMIDE 10 MG/ML
40 INJECTION INTRAMUSCULAR; INTRAVENOUS ONCE
Status: COMPLETED | OUTPATIENT
Start: 2024-05-15 | End: 2024-05-15

## 2024-05-15 RX ADMIN — SODIUM CHLORIDE, PRESERVATIVE FREE 10 ML: 5 INJECTION INTRAVENOUS at 07:25

## 2024-05-15 RX ADMIN — FUROSEMIDE 40 MG: 10 INJECTION, SOLUTION INTRAMUSCULAR; INTRAVENOUS at 07:25

## 2024-05-15 NOTE — PROGRESS NOTES
Congestive Heart Failure Clinic   [facility]       Referring Provider: -    Primary Care Physician: Oly Crespo MD   Cardiologist: -DR. MERCER  Nephrologist: Dr. Sánchez      HISTORY OF PRESENT ILLNESS:     Wander Rocha is a 68 y.o. (1955) male with a history of HFpEF, most recent EF:  Lab Results   Component Value Date    LVEF 65 08/23/2022     He presents to the CHF clinic for ongoing evaluation and monitoring of heart failure.    In the CHF clinic today he denies any adverse symptoms except:  [] Dizziness or lightheadedness   [] Syncope or near syncope  [] Recent Fall  [] Shortness of breath at rest   [x] Dyspnea with exertion  [] Decline in functional capacity (unable to perform activities they had previously been able to do)  [] Fatigue   [] Orthopnea  [] PND  [] Nocturnal cough  [] Hemoptysis  [] Chest pain, pressure, or discomfort  [] Palpitations  [] Abdominal distention  [] Abdominal bloating  [] Early satiety  [] Blood in stool   [] Diarrhea  [] Constipation  [] Nausea/Vomiting  [] Decreased urinary response to oral diuretic   [] Scrotal swelling   [x] Lower extremity edema  [] Used PRN doses of oral diuretic   [x] Weight gain    Wt Readings from Last 3 Encounters:   05/15/24 95.7 kg (211 lb)   05/01/24 91.2 kg (201 lb)   04/19/24 96.6 kg (213 lb)           SOCIAL HISTORY:  [x] Denies tobacco, alcohol or illicit drug abuse  [] Tobacco use:  [] ETOH use:  [] Illicit drug use:        MEDICATIONS:    No Known Allergies    Current Outpatient Medications:     Insulin Pen Needle (PEN NEEDLES) 32G X 6 MM MISC, Take insulin daily, Disp: 100 each, Rfl: 1    SYNTHROID 100 MCG tablet, Take 1 tablet by mouth Daily, Disp: 90 tablet, Rfl: 1    allopurinol (ZYLOPRIM) 100 MG tablet, Take 2 tablets by mouth daily, Disp: 180 tablet, Rfl: 2    apixaban (ELIQUIS) 5 MG TABS tablet, Take 1 tablet by mouth 2 times daily, Disp: 180 tablet, Rfl: 2    atorvastatin (LIPITOR) 40 MG

## 2024-05-15 NOTE — PLAN OF CARE
Problem: Chronic Conditions and Co-morbidities  Goal: Patient's chronic conditions and co-morbidity symptoms are monitored and maintained or improved  Flowsheets (Taken 5/15/2024 0704)  Care Plan - Patient's Chronic Conditions and Co-Morbidity Symptoms are Monitored and Maintained or Improved: Monitor and assess patient's chronic conditions and comorbid symptoms for stability, deterioration, or improvement

## 2024-05-28 ENCOUNTER — TELEPHONE (OUTPATIENT)
Dept: INTERNAL MEDICINE | Age: 69
End: 2024-05-28

## 2024-05-28 ENCOUNTER — OFFICE VISIT (OUTPATIENT)
Dept: INTERNAL MEDICINE | Age: 69
End: 2024-05-28
Payer: MEDICARE

## 2024-05-28 VITALS
TEMPERATURE: 96.8 F | BODY MASS INDEX: 33.12 KG/M2 | OXYGEN SATURATION: 94 % | DIASTOLIC BLOOD PRESSURE: 88 MMHG | HEIGHT: 67 IN | HEART RATE: 67 BPM | WEIGHT: 211 LBS | SYSTOLIC BLOOD PRESSURE: 138 MMHG | RESPIRATION RATE: 18 BRPM

## 2024-05-28 DIAGNOSIS — F06.30 MOOD DISORDER DUE TO MEDICAL CONDITION: ICD-10-CM

## 2024-05-28 DIAGNOSIS — D64.9 ANEMIA, UNSPECIFIED TYPE: ICD-10-CM

## 2024-05-28 DIAGNOSIS — I48.19 PERSISTENT ATRIAL FIBRILLATION (HCC): ICD-10-CM

## 2024-05-28 DIAGNOSIS — E11.69 TYPE 2 DIABETES MELLITUS WITH OBESITY (HCC): ICD-10-CM

## 2024-05-28 DIAGNOSIS — E78.5 HYPERLIPIDEMIA, UNSPECIFIED HYPERLIPIDEMIA TYPE: ICD-10-CM

## 2024-05-28 DIAGNOSIS — I10 ESSENTIAL HYPERTENSION: Chronic | ICD-10-CM

## 2024-05-28 DIAGNOSIS — E11.65 TYPE 2 DIABETES MELLITUS WITH HYPERGLYCEMIA, WITH LONG-TERM CURRENT USE OF INSULIN (HCC): Primary | ICD-10-CM

## 2024-05-28 DIAGNOSIS — F33.0 MAJOR DEPRESSIVE DISORDER, RECURRENT, MILD (HCC): ICD-10-CM

## 2024-05-28 DIAGNOSIS — M1A.9XX0 CHRONIC GOUT WITHOUT TOPHUS, UNSPECIFIED CAUSE, UNSPECIFIED SITE: ICD-10-CM

## 2024-05-28 DIAGNOSIS — E66.9 TYPE 2 DIABETES MELLITUS WITH OBESITY (HCC): ICD-10-CM

## 2024-05-28 DIAGNOSIS — N18.32 STAGE 3B CHRONIC KIDNEY DISEASE (HCC): ICD-10-CM

## 2024-05-28 DIAGNOSIS — Z79.4 TYPE 2 DIABETES MELLITUS WITH DIABETIC AUTONOMIC NEUROPATHY, WITH LONG-TERM CURRENT USE OF INSULIN (HCC): ICD-10-CM

## 2024-05-28 DIAGNOSIS — E03.9 HYPOTHYROIDISM, UNSPECIFIED TYPE: Chronic | ICD-10-CM

## 2024-05-28 DIAGNOSIS — J44.9 CHRONIC OBSTRUCTIVE PULMONARY DISEASE, UNSPECIFIED COPD TYPE (HCC): ICD-10-CM

## 2024-05-28 DIAGNOSIS — F33.1 MAJOR DEPRESSIVE DISORDER, RECURRENT, MODERATE (HCC): ICD-10-CM

## 2024-05-28 DIAGNOSIS — Z79.4 TYPE 2 DIABETES MELLITUS WITH HYPERGLYCEMIA, WITH LONG-TERM CURRENT USE OF INSULIN (HCC): Primary | ICD-10-CM

## 2024-05-28 DIAGNOSIS — E11.43 TYPE 2 DIABETES MELLITUS WITH DIABETIC AUTONOMIC NEUROPATHY, WITH LONG-TERM CURRENT USE OF INSULIN (HCC): ICD-10-CM

## 2024-05-28 DIAGNOSIS — I50.22 CHRONIC SYSTOLIC (CONGESTIVE) HEART FAILURE (HCC): ICD-10-CM

## 2024-05-28 DIAGNOSIS — C49.20: ICD-10-CM

## 2024-05-28 PROBLEM — E83.52 HYPERCALCEMIA: Status: RESOLVED | Noted: 2022-07-22 | Resolved: 2024-05-28

## 2024-05-28 LAB
FERRITIN: 101 NG/ML
HBA1C MFR BLD: 7.2 %
IRON % SATURATION: 18 % (ref 20–55)
IRON: 60 UG/DL (ref 59–158)
TOTAL IRON BINDING CAPACITY: 342 UG/DL (ref 250–450)
TSH SERPL DL<=0.05 MIU/L-ACNC: 7.24 UIU/ML (ref 0.27–4.2)

## 2024-05-28 PROCEDURE — 99212 OFFICE O/P EST SF 10 MIN: CPT

## 2024-05-28 PROCEDURE — 99213 OFFICE O/P EST LOW 20 MIN: CPT

## 2024-05-28 PROCEDURE — 3051F HG A1C>EQUAL 7.0%<8.0%: CPT

## 2024-05-28 PROCEDURE — 3075F SYST BP GE 130 - 139MM HG: CPT

## 2024-05-28 PROCEDURE — 1123F ACP DISCUSS/DSCN MKR DOCD: CPT

## 2024-05-28 PROCEDURE — 36415 COLL VENOUS BLD VENIPUNCTURE: CPT

## 2024-05-28 PROCEDURE — 83036 HEMOGLOBIN GLYCOSYLATED A1C: CPT

## 2024-05-28 RX ORDER — ALLOPURINOL 100 MG/1
200 TABLET ORAL DAILY
Qty: 180 TABLET | Refills: 1 | Status: SHIPPED | OUTPATIENT
Start: 2024-05-28

## 2024-05-28 RX ORDER — LANCETS 30 GAUGE
EACH MISCELLANEOUS
Qty: 200 EACH | Refills: 0 | Status: SHIPPED | OUTPATIENT
Start: 2024-05-28

## 2024-05-28 RX ORDER — LEVOTHYROXINE SODIUM 0.03 MG/1
25 TABLET ORAL DAILY
Qty: 90 TABLET | Refills: 1 | Status: SHIPPED | OUTPATIENT
Start: 2024-05-28

## 2024-05-28 RX ORDER — INSULIN GLARGINE 100 [IU]/ML
25 INJECTION, SOLUTION SUBCUTANEOUS 2 TIMES DAILY
Qty: 30 ML | Refills: 4 | Status: SHIPPED | OUTPATIENT
Start: 2024-05-28

## 2024-05-28 RX ORDER — FERROUS SULFATE 325(65) MG
325 TABLET ORAL EVERY OTHER DAY
Qty: 45 TABLET | Refills: 1 | Status: SHIPPED | OUTPATIENT
Start: 2024-05-28

## 2024-05-28 RX ORDER — BLOOD SUGAR DIAGNOSTIC
STRIP MISCELLANEOUS
Qty: 100 EACH | Refills: 1 | Status: SHIPPED | OUTPATIENT
Start: 2024-05-28

## 2024-05-28 RX ORDER — BUMETANIDE 2 MG/1
2 TABLET ORAL DAILY
Qty: 90 TABLET | Refills: 1 | Status: SHIPPED
Start: 2024-05-28 | End: 2024-05-31 | Stop reason: SDUPTHER

## 2024-05-28 RX ORDER — ACETAMINOPHEN 500 MG
1000 TABLET ORAL DAILY PRN
Qty: 180 TABLET | Refills: 1 | Status: SHIPPED | OUTPATIENT
Start: 2024-05-28

## 2024-05-28 RX ORDER — LEVOTHYROXINE SODIUM 100 MCG
100 TABLET ORAL DAILY
Qty: 90 TABLET | Refills: 1 | Status: SHIPPED
Start: 2024-05-28 | End: 2024-05-30 | Stop reason: SDUPTHER

## 2024-05-28 RX ORDER — BLOOD SUGAR DIAGNOSTIC
STRIP MISCELLANEOUS
Qty: 200 EACH | Refills: 1 | Status: SHIPPED | OUTPATIENT
Start: 2024-05-28

## 2024-05-28 RX ORDER — ASPIRIN 81 MG/1
81 TABLET ORAL DAILY
Qty: 90 TABLET | Refills: 1 | Status: SHIPPED | OUTPATIENT
Start: 2024-05-28

## 2024-05-28 RX ORDER — ATORVASTATIN CALCIUM 40 MG/1
TABLET, FILM COATED ORAL
Qty: 90 TABLET | Refills: 1 | Status: SHIPPED | OUTPATIENT
Start: 2024-05-28

## 2024-05-28 RX ORDER — METOPROLOL SUCCINATE 25 MG/1
25 TABLET, EXTENDED RELEASE ORAL DAILY
Qty: 90 TABLET | Refills: 1 | Status: SHIPPED | OUTPATIENT
Start: 2024-05-28

## 2024-05-28 SDOH — ECONOMIC STABILITY: FOOD INSECURITY: WITHIN THE PAST 12 MONTHS, YOU WORRIED THAT YOUR FOOD WOULD RUN OUT BEFORE YOU GOT MONEY TO BUY MORE.: NEVER TRUE

## 2024-05-28 SDOH — ECONOMIC STABILITY: FOOD INSECURITY: WITHIN THE PAST 12 MONTHS, THE FOOD YOU BOUGHT JUST DIDN'T LAST AND YOU DIDN'T HAVE MONEY TO GET MORE.: NEVER TRUE

## 2024-05-28 SDOH — ECONOMIC STABILITY: INCOME INSECURITY: HOW HARD IS IT FOR YOU TO PAY FOR THE VERY BASICS LIKE FOOD, HOUSING, MEDICAL CARE, AND HEATING?: NOT HARD AT ALL

## 2024-05-28 NOTE — PATIENT INSTRUCTIONS
Dear Wander Rocha,        Thank you for coming to your appointment today. I hope we have addressed all of your needs.       Please make sure to do the following:  - Continue your medications as listed.  - I will call you with your lab results.   - Please increase your synthroid from 100mcg daily to 125mcg daily. A new prescription was sent to your pharmacy. We will recheck your thyroid function at our next appointment.   - We will see each other again in 3 months for your Annual Wellness Visit.     Call for a sooner appointment if needed.     Have a great day!        Sincerely,  Oly Crespo MD   5/28/2024  10:39 AM

## 2024-05-28 NOTE — TELEPHONE ENCOUNTER
Called to discuss lab results with patient. No answer, ok for voicemail, voicemail left.   Will have staff mail updated AVS with adjusted synthroid dosing.   Left call back with any concerns or questions.     Electronically signed by Oly Crespo MD on 5/28/2024 at 5:03 PM

## 2024-05-29 ENCOUNTER — TELEPHONE (OUTPATIENT)
Dept: INTERNAL MEDICINE | Age: 69
End: 2024-05-29

## 2024-05-29 NOTE — TELEPHONE ENCOUNTER
Pharmacy called and states  Ordered two meds different Synthroid 100 mcg as LAST and Levothyroxine 25mcg  Pharmacist wants to know should one be LAST? Questioning why Synthroid is LAST please advise Pharmacist @ [facility] [place]

## 2024-05-30 RX ORDER — LEVOTHYROXINE SODIUM 0.1 MG/1
100 TABLET ORAL DAILY
Qty: 90 TABLET | Refills: 1 | Status: SHIPPED | OUTPATIENT
Start: 2024-05-30

## 2024-05-30 NOTE — TELEPHONE ENCOUNTER
Dr. Crespo informed me she called [facility] and had spoke to Pharmacist for clarification of thyroid medication and pt was made aware of getting Lab work done per ordered when discharge from his visit with her

## 2024-05-30 NOTE — TELEPHONE ENCOUNTER
Called [facility] still wants clarification on script written yesterday for Thyroid one was LAST the other wasn't , will have Dr. Crespo call back and be specific and let pt know of future lab work ordered

## 2024-05-31 ENCOUNTER — APPOINTMENT (OUTPATIENT)
Dept: OTHER | Age: 69
End: 2024-05-31
Payer: MEDICARE

## 2024-05-31 VITALS
BODY MASS INDEX: 33.51 KG/M2 | SYSTOLIC BLOOD PRESSURE: 140 MMHG | OXYGEN SATURATION: 95 % | RESPIRATION RATE: 18 BRPM | HEART RATE: 72 BPM | WEIGHT: 214 LBS | DIASTOLIC BLOOD PRESSURE: 65 MMHG

## 2024-05-31 DIAGNOSIS — I50.22 CHRONIC SYSTOLIC (CONGESTIVE) HEART FAILURE (HCC): ICD-10-CM

## 2024-05-31 LAB
ANION GAP SERPL CALCULATED.3IONS-SCNC: 14 MMOL/L (ref 7–16)
BNP SERPL-MCNC: 1651 PG/ML (ref 0–125)
BUN SERPL-MCNC: 19 MG/DL (ref 6–23)
CALCIUM SERPL-MCNC: 8.9 MG/DL (ref 8.6–10.2)
CHLORIDE SERPL-SCNC: 100 MMOL/L (ref 98–107)
CO2 SERPL-SCNC: 22 MMOL/L (ref 22–29)
CREAT SERPL-MCNC: 1.1 MG/DL (ref 0.7–1.2)
GFR, ESTIMATED: 72 ML/MIN/1.73M2
GLUCOSE SERPL-MCNC: 170 MG/DL (ref 74–99)
POTASSIUM SERPL-SCNC: 4.3 MMOL/L (ref 3.5–5)
SODIUM SERPL-SCNC: 136 MMOL/L (ref 132–146)

## 2024-05-31 PROCEDURE — 2580000003 HC RX 258: Performed by: NURSE PRACTITIONER

## 2024-05-31 PROCEDURE — 96374 THER/PROPH/DIAG INJ IV PUSH: CPT

## 2024-05-31 PROCEDURE — 99214 OFFICE O/P EST MOD 30 MIN: CPT

## 2024-05-31 PROCEDURE — 6360000002 HC RX W HCPCS: Performed by: NURSE PRACTITIONER

## 2024-05-31 PROCEDURE — 80048 BASIC METABOLIC PNL TOTAL CA: CPT

## 2024-05-31 PROCEDURE — 83880 ASSAY OF NATRIURETIC PEPTIDE: CPT

## 2024-05-31 RX ORDER — FUROSEMIDE 10 MG/ML
40 INJECTION INTRAMUSCULAR; INTRAVENOUS ONCE
Status: COMPLETED | OUTPATIENT
Start: 2024-05-31 | End: 2024-05-31

## 2024-05-31 RX ORDER — SODIUM CHLORIDE 0.9 % (FLUSH) 0.9 %
10 SYRINGE (ML) INJECTION PRN
Status: DISCONTINUED | OUTPATIENT
Start: 2024-05-31 | End: 2024-06-01 | Stop reason: HOSPADM

## 2024-05-31 RX ORDER — BUMETANIDE 2 MG/1
2 TABLET ORAL DAILY
Qty: 90 TABLET | Refills: 1 | Status: SHIPPED | OUTPATIENT
Start: 2024-05-31

## 2024-05-31 RX ADMIN — SODIUM CHLORIDE, PRESERVATIVE FREE 10 ML: 5 INJECTION INTRAVENOUS at 09:12

## 2024-05-31 RX ADMIN — FUROSEMIDE 40 MG: 10 INJECTION, SOLUTION INTRAMUSCULAR; INTRAVENOUS at 09:11

## 2024-05-31 NOTE — PLAN OF CARE
Problem: Chronic Conditions and Co-morbidities  Goal: Patient's chronic conditions and co-morbidity symptoms are monitored and maintained or improved  Flowsheets (Taken 5/31/2024 1055)  Care Plan - Patient's Chronic Conditions and Co-Morbidity Symptoms are Monitored and Maintained or Improved: Monitor and assess patient's chronic conditions and comorbid symptoms for stability, deterioration, or improvement

## 2024-05-31 NOTE — RESULT ENCOUNTER NOTE
Labs and CHF clinic note reviewed  Vitals: 140/65, 72    Ongoing hypervolemia requiring supplemental IV diuretic in CHF clinic   Increase Bumex 2 mg daily   On supplemental potassium   Follow up in CHF clinic in 1 week as scheduled    Thank you

## 2024-05-31 NOTE — PROGRESS NOTES
Congestive Heart Failure Clinic   [facility]       Referring Provider: -    Primary Care Physician: Oly Crespo MD   Cardiologist: -DR. MERCER  Nephrologist: Dr. Sánchez      HISTORY OF PRESENT ILLNESS:     Wander Rocha is a 68 y.o. (1955) male with a history of HFpEF, most recent EF:  Lab Results   Component Value Date    LVEF 65 08/23/2022     He presents to the CHF clinic for ongoing evaluation and monitoring of heart failure.    In the CHF clinic today he denies any adverse symptoms except:  [] Dizziness or lightheadedness   [] Syncope or near syncope  [] Recent Fall  [] Shortness of breath at rest   [x] Dyspnea with exertion  [] Decline in functional capacity (unable to perform activities they had previously been able to do)  [] Fatigue   [] Orthopnea  [] PND  [] Nocturnal cough  [] Hemoptysis  [] Chest pain, pressure, or discomfort  [] Palpitations  [] Abdominal distention  [x] Abdominal bloating  [] Early satiety  [] Blood in stool   [] Diarrhea  [] Constipation  [] Nausea/Vomiting  [] Decreased urinary response to oral diuretic   [] Scrotal swelling   [x] Lower extremity edema  [] Used PRN doses of oral diuretic   [x] Weight gain    Wt Readings from Last 3 Encounters:   05/31/24 97.1 kg (214 lb)   05/28/24 95.7 kg (211 lb)   05/15/24 95.7 kg (211 lb)           SOCIAL HISTORY:  [x] Denies tobacco, alcohol or illicit drug abuse  [] Tobacco use:  [] ETOH use:  [] Illicit drug use:        MEDICATIONS:    No Known Allergies    Current Outpatient Medications:     levothyroxine (SYNTHROID) 100 MCG tablet, Take 1 tablet by mouth Daily, Disp: 90 tablet, Rfl: 1    sertraline (ZOLOFT) 50 MG tablet, Take 1 tablet by mouth daily, Disp: 90 tablet, Rfl: 1    metoprolol succinate (TOPROL XL) 25 MG extended release tablet, Take 1 tablet by mouth daily, Disp: 90 tablet, Rfl: 1    insulin glargine (BASAGLAR KWIKPEN) 100 UNIT/ML injection pen, Inject 25 Units into the skin 2

## 2024-05-31 NOTE — PROGRESS NOTES
Signed         Labs and CHF clinic note reviewed  Vitals: 140/65, 72     Ongoing hypervolemia requiring supplemental IV diuretic in CHF clinic   Increase Bumex 2 mg daily   On supplemental potassium   Follow up in CHF clinic in 1 week as scheduled     Thank you                     Called patient and left above message on answering machine. I asked that he call back to reschedule.

## 2024-06-05 ENCOUNTER — HOSPITAL ENCOUNTER (OUTPATIENT)
Dept: OTHER | Age: 69
Setting detail: THERAPIES SERIES
Discharge: HOME | End: 2024-06-05
Payer: MEDICARE

## 2024-06-05 VITALS
HEART RATE: 70 BPM | OXYGEN SATURATION: 94 % | RESPIRATION RATE: 18 BRPM | SYSTOLIC BLOOD PRESSURE: 130 MMHG | BODY MASS INDEX: 32.88 KG/M2 | DIASTOLIC BLOOD PRESSURE: 60 MMHG | WEIGHT: 210 LBS

## 2024-06-05 LAB
ANION GAP SERPL CALCULATED.3IONS-SCNC: 11 MMOL/L (ref 7–16)
BNP SERPL-MCNC: 1482 PG/ML (ref 0–125)
BUN SERPL-MCNC: 32 MG/DL (ref 6–23)
CALCIUM SERPL-MCNC: 8.8 MG/DL (ref 8.6–10.2)
CHLORIDE SERPL-SCNC: 98 MMOL/L (ref 98–107)
CO2 SERPL-SCNC: 25 MMOL/L (ref 22–29)
CREAT SERPL-MCNC: 1.2 MG/DL (ref 0.7–1.2)
GFR, ESTIMATED: 63 ML/MIN/1.73M2
GLUCOSE SERPL-MCNC: 184 MG/DL (ref 74–99)
POTASSIUM SERPL-SCNC: 4.1 MMOL/L (ref 3.5–5)
SODIUM SERPL-SCNC: 134 MMOL/L (ref 132–146)

## 2024-06-05 PROCEDURE — 99214 OFFICE O/P EST MOD 30 MIN: CPT

## 2024-06-05 PROCEDURE — 83880 ASSAY OF NATRIURETIC PEPTIDE: CPT

## 2024-06-05 PROCEDURE — 96374 THER/PROPH/DIAG INJ IV PUSH: CPT

## 2024-06-05 PROCEDURE — 80048 BASIC METABOLIC PNL TOTAL CA: CPT

## 2024-06-05 PROCEDURE — 2580000003 HC RX 258: Performed by: INTERNAL MEDICINE

## 2024-06-05 PROCEDURE — 6360000002 HC RX W HCPCS: Performed by: INTERNAL MEDICINE

## 2024-06-05 RX ORDER — SODIUM CHLORIDE 0.9 % (FLUSH) 0.9 %
10 SYRINGE (ML) INJECTION PRN
Status: DISCONTINUED | OUTPATIENT
Start: 2024-06-05 | End: 2024-06-06 | Stop reason: HOSPADM

## 2024-06-05 RX ORDER — FUROSEMIDE 10 MG/ML
40 INJECTION INTRAMUSCULAR; INTRAVENOUS ONCE
Status: COMPLETED | OUTPATIENT
Start: 2024-06-05 | End: 2024-06-05

## 2024-06-05 RX ADMIN — SODIUM CHLORIDE, PRESERVATIVE FREE 10 ML: 5 INJECTION INTRAVENOUS at 08:03

## 2024-06-05 RX ADMIN — FUROSEMIDE 40 MG: 10 INJECTION, SOLUTION INTRAMUSCULAR; INTRAVENOUS at 08:02

## 2024-06-05 NOTE — PROGRESS NOTES
6/5/24- 0940- I called and left a voice message on Wander's phone to make sure he is staying hydrated, drink 64 ounces/day, due to elevated BUN.    HEATHER Cavazos RN.  
glargine (BASAGLAR KWIKPEN) 100 UNIT/ML injection pen, Inject 25 Units into the skin 2 times daily Do not administer if blood sugar is below 180. PAP medication., Disp: 30 mL, Rfl: 4    atorvastatin (LIPITOR) 40 MG tablet, TAKE ONE TABLET BY MOUTH DAILY, Disp: 90 tablet, Rfl: 1    aspirin 81 MG EC tablet, Take 1 tablet by mouth daily, Disp: 90 tablet, Rfl: 1    apixaban (ELIQUIS) 5 MG TABS tablet, Take 1 tablet by mouth 2 times daily, Disp: 180 tablet, Rfl: 2    allopurinol (ZYLOPRIM) 100 MG tablet, Take 2 tablets by mouth daily, Disp: 180 tablet, Rfl: 1    acetaminophen (TYLENOL) 500 MG tablet, Take 2 tablets by mouth daily as needed for Pain, Disp: 180 tablet, Rfl: 1    Insulin Pen Needle (PEN NEEDLES) 32G X 6 MM MISC, Take insulin daily, Disp: 100 each, Rfl: 1    blood glucose test strips (ONETOUCH VERIO) strip, TEST IN THE MORNING AND IN THE EVENING, Disp: 200 each, Rfl: 1    ferrous sulfate (IRON 325) 325 (65 Fe) MG tablet, Take 1 tablet by mouth every other day, Disp: 45 tablet, Rfl: 1    Lancets MISC, Give Delica lancets. Tests twice a day A.M. And P.M, Disp: 200 each, Rfl: 0    Potassium 99 MG TABS, Take 1 tablet by mouth daily, Disp: 90 tablet, Rfl: 1    levothyroxine (SYNTHROID) 25 MCG tablet, Take 1 tablet by mouth daily In combination with the 100mcg synthroid; total 125mcg of synthroid daily., Disp: 90 tablet, Rfl: 1    HYDROcodone-acetaminophen (NORCO) 7.5-325 MG per tablet, , Disp: , Rfl:     Cholecalciferol (VITAMIN D) 50 MCG (2000 UT) CAPS capsule, Take 2,000 Units by mouth daily, Disp: , Rfl:     Misc. Devices MISC, Please add portability to current O2 order. Resp to evaluate patient for OCD device., Disp: 1 each, Rfl: 0    Current Facility-Administered Medications:     sodium chloride flush 0.9 % injection 10 mL, 10 mL, IntraVENous, PRN, Migue Ruggiero MD, 10 mL at 06/05/24 0803       GUIDELINE DIRECTED MEDICAL THERAPY for HFpEF:  SGLT2i:  (2a indication)  No ( pt states Dr. Bass took him off

## 2024-06-05 NOTE — PLAN OF CARE
Problem: Chronic Conditions and Co-morbidities  Goal: Patient's chronic conditions and co-morbidity symptoms are monitored and maintained or improved  Flowsheets (Taken 6/5/2024 8192)  Care Plan - Patient's Chronic Conditions and Co-Morbidity Symptoms are Monitored and Maintained or Improved: Monitor and assess patient's chronic conditions and comorbid symptoms for stability, deterioration, or improvement

## 2024-06-19 ENCOUNTER — OFFICE VISIT (OUTPATIENT)
Dept: PRIMARY CARE CLINIC | Age: 69
End: 2024-06-19

## 2024-06-19 ENCOUNTER — HOSPITAL ENCOUNTER (EMERGENCY)
Age: 69
Discharge: HOME | End: 2024-06-19
Payer: MEDICARE

## 2024-06-19 VITALS
DIASTOLIC BLOOD PRESSURE: 64 MMHG | OXYGEN SATURATION: 96 % | RESPIRATION RATE: 20 BRPM | SYSTOLIC BLOOD PRESSURE: 135 MMHG | HEART RATE: 101 BPM

## 2024-06-19 VITALS
TEMPERATURE: 97.3 F | HEIGHT: 67 IN | HEART RATE: 77 BPM | DIASTOLIC BLOOD PRESSURE: 72 MMHG | BODY MASS INDEX: 32.96 KG/M2 | OXYGEN SATURATION: 97 % | SYSTOLIC BLOOD PRESSURE: 124 MMHG | WEIGHT: 210 LBS

## 2024-06-19 DIAGNOSIS — Z23 NEED FOR TETANUS BOOSTER: ICD-10-CM

## 2024-06-19 DIAGNOSIS — Z51.89 VISIT FOR WOUND CHECK: Primary | ICD-10-CM

## 2024-06-19 DIAGNOSIS — S61.511A LACERATION OF RIGHT WRIST, INITIAL ENCOUNTER: Primary | ICD-10-CM

## 2024-06-19 LAB
BASOPHILS # BLD: 0.05 K/UL (ref 0–0.2)
BASOPHILS NFR BLD: 1 % (ref 0–2)
EOSINOPHIL # BLD: 0.38 K/UL (ref 0.05–0.5)
EOSINOPHILS RELATIVE PERCENT: 6 % (ref 0–6)
ERYTHROCYTE [DISTWIDTH] IN BLOOD BY AUTOMATED COUNT: 15.2 % (ref 11.5–15)
HCT VFR BLD AUTO: 30.2 % (ref 37–54)
HGB BLD-MCNC: 9.7 G/DL (ref 12.5–16.5)
IMM GRANULOCYTES # BLD AUTO: 0.14 K/UL (ref 0–0.58)
IMM GRANULOCYTES NFR BLD: 2 % (ref 0–5)
INR PPP: 2.6
LYMPHOCYTES NFR BLD: 0.88 K/UL (ref 1.5–4)
LYMPHOCYTES RELATIVE PERCENT: 13 % (ref 20–42)
MCH RBC QN AUTO: 30.7 PG (ref 26–35)
MCHC RBC AUTO-ENTMCNC: 32.1 G/DL (ref 32–34.5)
MCV RBC AUTO: 95.6 FL (ref 80–99.9)
MONOCYTES NFR BLD: 0.59 K/UL (ref 0.1–0.95)
MONOCYTES NFR BLD: 9 % (ref 2–12)
NEUTROPHILS NFR BLD: 69 % (ref 43–80)
NEUTS SEG NFR BLD: 4.59 K/UL (ref 1.8–7.3)
PLATELET # BLD AUTO: 210 K/UL (ref 130–450)
PMV BLD AUTO: 9.9 FL (ref 7–12)
PROTHROMBIN TIME: 28.6 SEC (ref 9.3–12.4)
RBC # BLD AUTO: 3.16 M/UL (ref 3.8–5.8)
WBC OTHER # BLD: 6.6 K/UL (ref 4.5–11.5)

## 2024-06-19 PROCEDURE — 85610 PROTHROMBIN TIME: CPT

## 2024-06-19 PROCEDURE — 85025 COMPLETE CBC W/AUTO DIFF WBC: CPT

## 2024-06-19 PROCEDURE — 99283 EMERGENCY DEPT VISIT LOW MDM: CPT

## 2024-06-19 RX ORDER — LIDOCAINE HYDROCHLORIDE AND EPINEPHRINE 10; 10 MG/ML; UG/ML
20 INJECTION, SOLUTION INFILTRATION; PERINEURAL ONCE
Status: DISCONTINUED | OUTPATIENT
Start: 2024-06-19 | End: 2024-06-20 | Stop reason: HOSPADM

## 2024-06-19 RX ORDER — LIDOCAINE HYDROCHLORIDE 20 MG/ML
3 INJECTION, SOLUTION INFILTRATION; PERINEURAL ONCE
Status: COMPLETED | OUTPATIENT
Start: 2024-06-19 | End: 2024-06-19

## 2024-06-19 RX ADMIN — LIDOCAINE HYDROCHLORIDE 3 ML: 20 INJECTION, SOLUTION INFILTRATION; PERINEURAL at 15:34

## 2024-06-19 ASSESSMENT — PAIN - FUNCTIONAL ASSESSMENT: PAIN_FUNCTIONAL_ASSESSMENT: 0-10

## 2024-06-19 ASSESSMENT — PAIN DESCRIPTION - ORIENTATION: ORIENTATION: RIGHT

## 2024-06-19 ASSESSMENT — PAIN DESCRIPTION - LOCATION: LOCATION: WRIST

## 2024-06-19 ASSESSMENT — PAIN DESCRIPTION - DESCRIPTORS: DESCRIPTORS: DISCOMFORT

## 2024-06-19 ASSESSMENT — PAIN SCALES - GENERAL: PAINLEVEL_OUTOF10: 5

## 2024-06-19 NOTE — PROGRESS NOTES
Chief Complaint:   Laceration (Pt states went to fall and grabbed something it cut his right arm which happened approximately 2 hours ago.)    History of Present Illness   Source of history provided by:  patient.     Wander Rocha is a 68 y.o. old male presenting to the walk-in for a laceration to the right wrist, caused by falling on , which occurred 2 hours prior to arrival.  Pt states bleeding is not controlled and there is no N/T to the site. Tetanus Status: unknown. Pt denies any significant pain at the site, loss of function to the area, fever, chills, lethargy, N/V, or syncope. He takes Eliquis daily for atrial fibrillation.      Review of Systems   Unless otherwise stated in this report or unable to obtain because of the patient's clinical or mental status as evidenced by the medical record, this patients's positive and negative responses for Review of Systems, constitutional, psych, eyes, ENT, cardiovascular, respiratory, gastrointestinal, neurological, genitourinary, musculoskeletal, integument systems and systems related to the presenting problem are either stated in the preceding or were not pertinent or were negative for the symptoms and/or complaints related to the medical problem.    Past Medical History:  has a past medical history of RADHA (acute kidney injury) (HCC), Atrial fibrillation (HCC), Carpal tunnel syndrome, Colorectal polyps, Diverticula of colon, Encounter regarding vascular access for dialysis for ESRD (HCC), Hyperlipidemia, Hypertension, Hypothyroidism, Lung nodule, Lung nodules, Nocturnal hypoxemia due to obesity, Obesity, NIKO (obstructive sleep apnea), Osteoarthritis, Pacemaker, Pinched nerve, Restrictive lung disease secondary to obesity, S/P laminectomy with spinal fusion, Sinus node dysfunction (HCC), Skin lesion of face, and Type II or unspecified type diabetes mellitus without mention of complication, not stated as uncontrolled.  Past Surgical History:  has a past surgical

## 2024-06-20 ENCOUNTER — OFFICE VISIT (OUTPATIENT)
Dept: PRIMARY CARE CLINIC | Age: 69
End: 2024-06-20
Payer: MEDICARE

## 2024-06-20 VITALS
HEIGHT: 67 IN | TEMPERATURE: 97.9 F | OXYGEN SATURATION: 95 % | RESPIRATION RATE: 16 BRPM | SYSTOLIC BLOOD PRESSURE: 139 MMHG | HEART RATE: 76 BPM | BODY MASS INDEX: 32.96 KG/M2 | DIASTOLIC BLOOD PRESSURE: 80 MMHG | WEIGHT: 210 LBS

## 2024-06-20 DIAGNOSIS — S61.511D LACERATION OF RIGHT WRIST, SUBSEQUENT ENCOUNTER: Primary | ICD-10-CM

## 2024-06-20 DIAGNOSIS — T14.8XXD ENCOUNTER FOR RE-CHECK OF LACERATION WOUND: ICD-10-CM

## 2024-06-20 PROCEDURE — 1123F ACP DISCUSS/DSCN MKR DOCD: CPT | Performed by: NURSE PRACTITIONER

## 2024-06-20 PROCEDURE — 99213 OFFICE O/P EST LOW 20 MIN: CPT | Performed by: NURSE PRACTITIONER

## 2024-06-20 PROCEDURE — 3075F SYST BP GE 130 - 139MM HG: CPT | Performed by: NURSE PRACTITIONER

## 2024-06-20 PROCEDURE — 3079F DIAST BP 80-89 MM HG: CPT | Performed by: NURSE PRACTITIONER

## 2024-06-20 NOTE — DISCHARGE INSTRUCTIONS
Leave current dressing intact for the next 48 hours, do not take it off.  After that you may take it off and continue routine care as instructed earlier.  Always cleanse laceration site with warm Dial soap and water do not use peroxide or alcohol and apply bacitracin ointment as prescribed.  Follow-up with your primary care doctor for a wound reevaluation.

## 2024-06-20 NOTE — ED PROVIDER NOTES
this afternoon he obtained a laceration to the right wrist from a piece of metal.  He did present to one of our local [facility] urgent care clinics and did have 5 sutures placed.  Patient reports being up-to-date on his tetanus immunization.  Patient is on Eliquis for A-fib.  He states that he initially did not have any bleeding at all on his way home after getting the suture repair but shortly after returning home he has been having a constant ooze of blood from the laceration site.  Patient denies any weakness or dizziness as well as no unusual chest pain or shortness of breath.  Patient with active bleeding on arrival here.  He denies any new injury or trauma.  Differential includes arterial injury versus wound check versus abrasion.  On evaluation dressing was removed he does have all sutures intact but does have a constant ooze of blood , questionable superficial arterial bleeding noted.  Patient did have excellent wound care completed, will place small amount of lidocaine with epinephrine around the site and then will place a soaked gauze and  Surgicel with Coban wrapping to the site and will reevaluate.  Patient was observed here for over an hour in the ER.  Patient without any further bleeding or strikethrough noted through the current dressing with Coban wrap.  Patient will be discharged home I did perform labs and everything is unremarkable.  Patient was educated to leave current dressing intact for 48 hours.  He was made aware after that he can safely remove it and continue daily wound care and dressings.  He was educated on signs symptoms of infection, daily wound care as well as strict return precautions.  Patient is neurovascular and hemodynamically intact.  Patient expressed understanding of good follow-up care.  Patient will be safely discharged home.     History from : Patient and Medical records     Limitations to history : None    Chronic Conditions: has a past medical history of RADHA (acute

## 2024-06-20 NOTE — PROGRESS NOTES
Chief Complaint:   Laceration (OPENED BACK UP)    History of Present Illness   Source of history provided by:  patient.     Wander Rocha is a 68 y.o. old male who presents to walk-in, for evaluation of laceration located on ulnar aspect of the right wrist, which occured 1 day(s) prior to arrival.   The wound is / has active bleeding. He had an approximately 2 cm laceration noted to the right wrist yesterday and had 5 sutures placed. Sutures are still in place and intact. He had a compression dressing yesterday in the office, reports bleeding stopped then started again shortly after. He did end up in the ER last night where they injected lidocaine with epinephrine around the laceration site, placed Surgicell and a compression bandage. Reports bleeding stopped, however, today he did hit his arm on something and bleeding restarted. He is currently on Eliquis for a-fib. Denies dizziness, chest pain or SOB. Good sensation to the hand and fingers.               Prior Treatment:     [x]   Sutured      []   Stapled      []   Packing      []     ATB's: None     Review of Systems   Unless otherwise stated in this report or unable to obtain because of the patient's clinical or mental status as evidenced by the medical record, this patients's positive and negative responses for Review of Systems, constitutional, psych, eyes, ENT, cardiovascular, respiratory, gastrointestinal, neurological, genitourinary, musculoskeletal, integument systems and systems related to the presenting problem are either stated in the preceding or were not pertinent or were negative for the symptoms and/or complaints related to the medical problem.    Past Medical History:  has a past medical history of RADHA (acute kidney injury) ([facility]), Atrial fibrillation (HCC), Carpal tunnel syndrome, Colorectal polyps, Diverticula of colon, Encounter regarding vascular access for dialysis for ESRD ([facility]), Hyperlipidemia, Hypertension, Hypothyroidism, Lung nodule,

## 2024-06-21 ENCOUNTER — HOSPITAL ENCOUNTER (OUTPATIENT)
Dept: OTHER | Age: 69
Setting detail: THERAPIES SERIES
Discharge: HOME | End: 2024-06-21
Payer: MEDICARE

## 2024-06-21 ENCOUNTER — HOSPITAL ENCOUNTER (EMERGENCY)
Age: 69
Discharge: HOME | End: 2024-06-21
Payer: MEDICARE

## 2024-06-21 VITALS
SYSTOLIC BLOOD PRESSURE: 105 MMHG | HEART RATE: 79 BPM | OXYGEN SATURATION: 95 % | WEIGHT: 193 LBS | DIASTOLIC BLOOD PRESSURE: 53 MMHG | BODY MASS INDEX: 30.23 KG/M2 | RESPIRATION RATE: 18 BRPM

## 2024-06-21 VITALS
RESPIRATION RATE: 16 BRPM | DIASTOLIC BLOOD PRESSURE: 47 MMHG | TEMPERATURE: 97.7 F | SYSTOLIC BLOOD PRESSURE: 108 MMHG | BODY MASS INDEX: 30.23 KG/M2 | HEART RATE: 64 BPM | WEIGHT: 193 LBS | OXYGEN SATURATION: 94 %

## 2024-06-21 DIAGNOSIS — Z51.89 VISIT FOR WOUND CHECK: Primary | ICD-10-CM

## 2024-06-21 LAB
ANION GAP SERPL CALCULATED.3IONS-SCNC: 13 MMOL/L (ref 7–16)
BNP SERPL-MCNC: 1070 PG/ML (ref 0–125)
BUN SERPL-MCNC: 40 MG/DL (ref 6–23)
CALCIUM SERPL-MCNC: 9.2 MG/DL (ref 8.6–10.2)
CHLORIDE SERPL-SCNC: 94 MMOL/L (ref 98–107)
CO2 SERPL-SCNC: 28 MMOL/L (ref 22–29)
CREAT SERPL-MCNC: 1.5 MG/DL (ref 0.7–1.2)
GFR, ESTIMATED: 51 ML/MIN/1.73M2
GLUCOSE SERPL-MCNC: 163 MG/DL (ref 74–99)
POTASSIUM SERPL-SCNC: 3.8 MMOL/L (ref 3.5–5)
SODIUM SERPL-SCNC: 135 MMOL/L (ref 132–146)

## 2024-06-21 PROCEDURE — 99214 OFFICE O/P EST MOD 30 MIN: CPT

## 2024-06-21 PROCEDURE — 83880 ASSAY OF NATRIURETIC PEPTIDE: CPT

## 2024-06-21 PROCEDURE — 80048 BASIC METABOLIC PNL TOTAL CA: CPT

## 2024-06-21 PROCEDURE — 99283 EMERGENCY DEPT VISIT LOW MDM: CPT

## 2024-06-21 RX ORDER — DOXYCYCLINE HYCLATE 100 MG
100 TABLET ORAL 2 TIMES DAILY
Qty: 14 TABLET | Refills: 0 | Status: SHIPPED | OUTPATIENT
Start: 2024-06-21 | End: 2024-06-28

## 2024-06-21 NOTE — PROGRESS NOTES
Congestive Heart Failure Clinic   [facility]       Referring Provider: -    Primary Care Physician: Oly Crespo MD   Cardiologist: -DR. MERCER  Nephrologist: Dr. Sánchez      HISTORY OF PRESENT ILLNESS:     Wander Rocha is a 68 y.o. (1955) male with a history of HFpEF, most recent EF:  Lab Results   Component Value Date    LVEF 65 08/23/2022     He presents to the CHF clinic for ongoing evaluation and monitoring of heart failure.    In the CHF clinic today he denies any adverse symptoms except:  [] Dizziness or lightheadedness   [] Syncope or near syncope  [] Recent Fall  [] Shortness of breath at rest   [x] Dyspnea with exertion  [] Decline in functional capacity (unable to perform activities they had previously been able to do)  [] Fatigue   [] Orthopnea  [] PND  [] Nocturnal cough  [] Hemoptysis  [] Chest pain, pressure, or discomfort  [] Palpitations  [] Abdominal distention  [] Abdominal bloating  [] Early satiety  [] Blood in stool   [] Diarrhea  [] Constipation  [] Nausea/Vomiting  [] Decreased urinary response to oral diuretic   [] Scrotal swelling   [] Lower extremity edema  [] Used PRN doses of oral diuretic   [] Weight gain    Wt Readings from Last 3 Encounters:   06/21/24 87.5 kg (193 lb)   06/20/24 95.3 kg (210 lb)   06/19/24 95.3 kg (210 lb)           SOCIAL HISTORY:  [x] Denies tobacco, alcohol or illicit drug abuse  [] Tobacco use:  [] ETOH use:  [] Illicit drug use:        MEDICATIONS:    No Known Allergies    Current Outpatient Medications:     bumetanide (BUMEX) 2 MG tablet, Take 1 tablet by mouth daily, Disp: 90 tablet, Rfl: 1    levothyroxine (SYNTHROID) 100 MCG tablet, Take 1 tablet by mouth Daily, Disp: 90 tablet, Rfl: 1    sertraline (ZOLOFT) 50 MG tablet, Take 1 tablet by mouth daily, Disp: 90 tablet, Rfl: 1    metoprolol succinate (TOPROL XL) 25 MG extended release tablet, Take 1 tablet by mouth daily, Disp: 90 tablet, Rfl: 1    insulin

## 2024-06-21 NOTE — DISCHARGE INSTRUCTIONS
Please change the dressing daily, you were started on antibiotics due to the fact that this has been continually bleeding.  Please take the antibiotics in full for the next 7 days and please follow-up with your doctor.  Please resume your blood thinner tomorrow.

## 2024-06-21 NOTE — DISCHARGE INSTR - COC
Continuity of Care Form    Patient Name: Wander Rocha   :  1955  MRN:  [id number]    Admit date:  2024  Discharge date:  ***    Code Status Order: Prior   Advance Directives:     Admitting Physician:  No admitting provider for patient encounter.  PCP: Oly Crespo MD    Discharging Nurse: ***  Discharging Hospital Unit/Room#: ST04/ST-04  Discharging Unit Phone Number: ***    Emergency Contact:   Extended Emergency Contact Information  Primary Emergency Contact: Petra Rocha  Address: [address] [place] [address]  Home Phone: [phone]  Work Phone: [phone]  Mobile Phone: [phone]  Relation: Spouse  Secondary Emergency Contact: AshwinionYohannesRubina   [facility]  Home Phone: [phone]  Relation: Child    Past Surgical History:  Past Surgical History:   Procedure Laterality Date    BACK SURGERY      [facility]. Pinched nerve in back    BACK SURGERY  2017    BACK SURGERY N/A 2022    EXCISON OF SOFT TISSUE NEOPLASM OF UPPER BACK performed by Santana Maurer MD at [facility] OR    BACK SURGERY Left 2023    BACK LESION EXCISION SKIN GRAFT performed by Santana Maurer MD at [facility] OR    COLONOSCOPY      multiple colon polyps    COLONOSCOPY  2012    COLONOSCOPY    COLONOSCOPY  2022    polyp; diverticula--sarah    COLONOSCOPY N/A 2022    COLONOSCOPY POLYPECTOMY HOT BIOPSY (Snare) performed by Santana Maurer MD at [facility] ENDOSCOPY    EYE SURGERY      lennie cataracts implant    HERNIA REPAIR      umbilical    PACEMAKER PLACEMENT  10/03/2017    Medtronic dual chamber    Dr. Vizcarra    ROTATOR CUFF REPAIR Right        Immunization History:   Immunization History   Administered Date(s) Administered    COVID-19, MODERNA BLUE border, Primary or Immunocompromised, (age 12y+), IM, 100 mcg/0.5mL 2021, 2021, 2021    COVID-19, PFIZER, ( formula), (age 12y+), IM, 30mcg/0.3mL 10/23/2023

## 2024-06-21 NOTE — PLAN OF CARE
Problem: Chronic Conditions and Co-morbidities  Goal: Patient's chronic conditions and co-morbidity symptoms are monitored and maintained or improved  Flowsheets (Taken 6/21/2024 0805)  Care Plan - Patient's Chronic Conditions and Co-Morbidity Symptoms are Monitored and Maintained or Improved: Monitor and assess patient's chronic conditions and comorbid symptoms for stability, deterioration, or improvement

## 2024-06-21 NOTE — ED PROVIDER NOTES
Independent MARY Visit.       [facility]  Department of Emergency Medicine   ED  Encounter Note  Admit Date/RoomTime: 2024  8:41 AM  ED Room: [place] [address]    NAME: Wander Rocha  : 1955  MRN: [id number]     Chief Complaint:  Wound Check (Lac to right wrist x 3 days ( cut on scrap metal + tetanus  )  sent from clinic due to ongoing bleeding + on thinners + on hold )    History of Present Illness        Wander Rocha is a 68 y.o. old male who presents to the emergency department by private vehicle, for evaluation of laceration located on the radial aspect of the right wrist, which occured 2 day(s) prior to arrival.  The wound was closed with sutures 2 days ago.. The wound is clean with sutures intact with 1 gap in between sutures placed actively bleeding.  Patient states on Tuesday he was putting scrap metal into the back of his truck when the edge of it scraped the radial aspect of his right wrist causing a laceration.  Patient was evaluated in urgent care on Tuesday and laceration was closed with sutures and dressed.  Patient is currently on blood thinners for A-fib and noticed active bleeding through the dressing of his laceration for the past 2 days.  Patient states he has stopped his blood thinners and is told to resume them tomorrow morning.  Denies pain swelling or decreased range of motion of the hand or fingers.      ROS   Pertinent positives and negatives are stated within HPI, all other systems reviewed and are negative.    Past Medical History:  has a past medical history of RADHA (acute kidney injury) (HCC), Atrial fibrillation (HCC), Carpal tunnel syndrome, Colorectal polyps, Diverticula of colon, Encounter regarding vascular access for dialysis for ESRD (HCC), Hyperlipidemia, Hypertension, Hypothyroidism, Lung nodule, Lung nodules, Nocturnal hypoxemia due to obesity, Obesity, NIKO (obstructive sleep apnea), Osteoarthritis, Pacemaker, Pinched nerve, Restrictive lung disease

## 2024-06-28 ENCOUNTER — OFFICE VISIT (OUTPATIENT)
Dept: PRIMARY CARE CLINIC | Age: 69
End: 2024-06-28
Payer: MEDICARE

## 2024-06-28 VITALS
RESPIRATION RATE: 16 BRPM | HEART RATE: 78 BPM | BODY MASS INDEX: 30.86 KG/M2 | HEIGHT: 66 IN | WEIGHT: 192 LBS | TEMPERATURE: 97.6 F | DIASTOLIC BLOOD PRESSURE: 60 MMHG | OXYGEN SATURATION: 92 % | SYSTOLIC BLOOD PRESSURE: 111 MMHG

## 2024-06-28 DIAGNOSIS — Z48.02 ENCOUNTER FOR REMOVAL OF SUTURES: Primary | ICD-10-CM

## 2024-06-28 PROCEDURE — 3078F DIAST BP <80 MM HG: CPT

## 2024-06-28 PROCEDURE — 3074F SYST BP LT 130 MM HG: CPT

## 2024-06-28 PROCEDURE — 1123F ACP DISCUSS/DSCN MKR DOCD: CPT

## 2024-06-28 PROCEDURE — 99212 OFFICE O/P EST SF 10 MIN: CPT

## 2024-06-28 NOTE — PROGRESS NOTES
Chief Complaint:   Suture / Staple Removal (Had them in since 6/20 no symptoms except itching)      History of Present Illness   Source of history provided by:  patient    Wander Rocha is a 69 y.o. male who presents to walk-in for reevaluation of the wound as well as removal of suture removal from the right wrist. The patient sustained a laceration 9 days ago. Patient states that the wound has been healing well without evidence of erythema, purulent drainage, wound dehiscence, or increased pain. The patient denies a sensation of foreign body. The patient denies any fevers or chills.     Review of Systems   Unless otherwise stated in this report or unable to obtain because of the patient's clinical or mental status as evidenced by the medical record, this patients's positive and negative responses for Review of Systems, constitutional, psych, eyes, ENT, cardiovascular, respiratory, gastrointestinal, neurological, genitourinary, musculoskeletal, integument systems and systems related to the presenting problem are either stated in the preceding or were not pertinent or were negative for the symptoms and/or complaints related to the medical problem.    Past Medical History:  has a past medical history of RADHA (acute kidney injury) (HCC), Atrial fibrillation (HCC), Carpal tunnel syndrome, Colorectal polyps, Diverticula of colon, Encounter regarding vascular access for dialysis for ESRD (HCC), Hyperlipidemia, Hypertension, Hypothyroidism, Lung nodule, Lung nodules, Nocturnal hypoxemia due to obesity, Obesity, NIKO (obstructive sleep apnea), Osteoarthritis, Pacemaker, Pinched nerve, Restrictive lung disease secondary to obesity, S/P laminectomy with spinal fusion, Sinus node dysfunction (HCC), Skin lesion of face, and Type II or unspecified type diabetes mellitus without mention of complication, not stated as uncontrolled.   Past Surgical History:  has a past surgical history that includes Colonoscopy (2007); back

## 2024-07-12 ENCOUNTER — HOSPITAL ENCOUNTER (OUTPATIENT)
Dept: OTHER | Age: 69
Setting detail: THERAPIES SERIES
Discharge: HOME | End: 2024-07-12
Payer: MEDICARE

## 2024-07-12 VITALS
OXYGEN SATURATION: 96 % | DIASTOLIC BLOOD PRESSURE: 60 MMHG | WEIGHT: 214 LBS | SYSTOLIC BLOOD PRESSURE: 130 MMHG | BODY MASS INDEX: 34.54 KG/M2 | HEART RATE: 78 BPM | RESPIRATION RATE: 18 BRPM

## 2024-07-12 LAB
ANION GAP SERPL CALCULATED.3IONS-SCNC: 8 MMOL/L (ref 7–16)
BNP SERPL-MCNC: 1601 PG/ML (ref 0–125)
BUN SERPL-MCNC: 37 MG/DL (ref 6–23)
CALCIUM SERPL-MCNC: 8.9 MG/DL (ref 8.6–10.2)
CHLORIDE SERPL-SCNC: 101 MMOL/L (ref 98–107)
CO2 SERPL-SCNC: 26 MMOL/L (ref 22–29)
CREAT SERPL-MCNC: 1.3 MG/DL (ref 0.7–1.2)
GFR, ESTIMATED: 59 ML/MIN/1.73M2
GLUCOSE SERPL-MCNC: 155 MG/DL (ref 74–99)
POTASSIUM SERPL-SCNC: 4.2 MMOL/L (ref 3.5–5)
SODIUM SERPL-SCNC: 135 MMOL/L (ref 132–146)

## 2024-07-12 PROCEDURE — 6360000002 HC RX W HCPCS: Performed by: NURSE PRACTITIONER

## 2024-07-12 PROCEDURE — 83880 ASSAY OF NATRIURETIC PEPTIDE: CPT

## 2024-07-12 PROCEDURE — 99214 OFFICE O/P EST MOD 30 MIN: CPT

## 2024-07-12 PROCEDURE — 80048 BASIC METABOLIC PNL TOTAL CA: CPT

## 2024-07-12 PROCEDURE — 2580000003 HC RX 258: Performed by: NURSE PRACTITIONER

## 2024-07-12 PROCEDURE — 96374 THER/PROPH/DIAG INJ IV PUSH: CPT

## 2024-07-12 RX ORDER — SODIUM CHLORIDE 0.9 % (FLUSH) 0.9 %
10 SYRINGE (ML) INJECTION 2 TIMES DAILY
Status: DISCONTINUED | OUTPATIENT
Start: 2024-07-12 | End: 2024-07-13 | Stop reason: HOSPADM

## 2024-07-12 RX ORDER — FUROSEMIDE 10 MG/ML
40 INJECTION INTRAMUSCULAR; INTRAVENOUS ONCE
Status: COMPLETED | OUTPATIENT
Start: 2024-07-12 | End: 2024-07-12

## 2024-07-12 RX ADMIN — FUROSEMIDE 40 MG: 10 INJECTION, SOLUTION INTRAMUSCULAR; INTRAVENOUS at 09:14

## 2024-07-12 RX ADMIN — SODIUM CHLORIDE, PRESERVATIVE FREE 10 ML: 5 INJECTION INTRAVENOUS at 09:15

## 2024-07-12 NOTE — RESULT ENCOUNTER NOTE
Labs and CHF clinic note reviewed   Spoke with Sherley CHAU in CHF clinic     Increase bumex 2 mg BID through the weekend   Returning to CHF clinic Monday    Thank you

## 2024-07-12 NOTE — PLAN OF CARE
Problem: Chronic Conditions and Co-morbidities  Goal: Patient's chronic conditions and co-morbidity symptoms are monitored and maintained or improved  7/12/2024 0931 by Sherley Driver RN  Flowsheets (Taken 7/12/2024 0931)  Care Plan - Patient's Chronic Conditions and Co-Morbidity Symptoms are Monitored and Maintained or Improved: Monitor and assess patient's chronic conditions and comorbid symptoms for stability, deterioration, or improvement  7/12/2024 0918 by Sherley Driver RN  Flowsheets (Taken 7/12/2024 0918)  Care Plan - Patient's Chronic Conditions and Co-Morbidity Symptoms are Monitored and Maintained or Improved: Monitor and assess patient's chronic conditions and comorbid symptoms for stability, deterioration, or improvement

## 2024-07-12 NOTE — PLAN OF CARE
Problem: Chronic Conditions and Co-morbidities  Goal: Patient's chronic conditions and co-morbidity symptoms are monitored and maintained or improved  Flowsheets (Taken 7/12/2024 2250)  Care Plan - Patient's Chronic Conditions and Co-Morbidity Symptoms are Monitored and Maintained or Improved: Monitor and assess patient's chronic conditions and comorbid symptoms for stability, deterioration, or improvement

## 2024-07-12 NOTE — PROGRESS NOTES
Ethel Vickers, APRN - CNP  Nurse Practitioner  Specialty: Nurse Practitioner     Result Encounter Note     Signed     Date of Service: 7/12/2024  9:15 AM     Signed         Labs and CHF clinic note reviewed   Spoke with Sherley CHAU in CHF clinic      Increase bumex 2 mg BID through the weekend   Returning to CHF clinic Monday     Thank you             ABOVE ORDERS GIVEN TO PT. AND WIFE , BOTH REPEATED INSTRUCTIONS AND VERBALIZED UNDERSTANDING,  
Bowel Sounds: Active  LLQ Bowel Sounds: Active  Peripheral Vascular  Peripheral Vascular (WDL): Within Defined Limits  Edema: None        Musculoskeletal  RL Extremity: Weakness  LL Extremity: Weakness     Psychosocial  Psychosocial (WDL): Within Defined Limits              Pulse: 78                 LAB DATA:  BMP:  Sodium (mmol/L)   Date Value   06/21/2024 135   06/05/2024 134   05/31/2024 136     Potassium (mmol/L)   Date Value   06/21/2024 3.8   06/05/2024 4.1   05/31/2024 4.3     Potassium reflex Magnesium (mmol/L)   Date Value   01/08/2023 5.1 (H)   01/08/2023 5.6 (H)   12/25/2022 6.5 (H)     Chloride (mmol/L)   Date Value   06/21/2024 94 (L)   06/05/2024 98   05/31/2024 100     CO2 (mmol/L)   Date Value   06/21/2024 28   06/05/2024 25   05/31/2024 22     BUN (mg/dL)   Date Value   06/21/2024 40 (H)   06/05/2024 32 (H)   05/31/2024 19     Creatinine (mg/dL)   Date Value   06/21/2024 1.5 (H)   06/05/2024 1.2   05/31/2024 1.1     Glucose (mg/dL)   Date Value   06/21/2024 163 (H)   06/05/2024 184 (H)   05/31/2024 170 (H)   04/18/2012 133 (H)   10/25/2011 138 (H)   01/28/2011 162 (H)     Calcium (mg/dL)   Date Value   06/21/2024 9.2   06/05/2024 8.8   05/31/2024 8.9     BNP:  Pro-BNP (pg/mL)   Date Value   06/21/2024 1,070 (H)   06/05/2024 1,482 (H)   05/31/2024 1,651 (H)      CBC:  WBC (k/uL)   Date Value   06/19/2024 6.6     Hemoglobin (g/dL)   Date Value   06/19/2024 9.7 (L)     Hematocrit (%)   Date Value   06/19/2024 30.2 (L)     Platelets (k/uL)   Date Value   06/19/2024 210     Iron Studies:  Ferritin (ng/mL)   Date Value   05/28/2024 101     Iron (ug/dL)   Date Value   05/28/2024 60     TIBC (ug/dL)   Date Value   05/28/2024 342     Hepatic:  AST (U/L)   Date Value   12/21/2023 26     ALT (U/L)   Date Value   12/21/2023 23     Total Bilirubin (mg/dL)   Date Value   12/21/2023 1.5 (H)     Alkaline Phosphatase (U/L)   Date Value   12/21/2023 134 (H)     INR:  INR (no units)   Date Value   06/19/2024 2.6

## 2024-07-15 ENCOUNTER — HOSPITAL ENCOUNTER (OUTPATIENT)
Dept: OTHER | Age: 69
Setting detail: THERAPIES SERIES
Discharge: HOME | End: 2024-07-15
Payer: MEDICARE

## 2024-07-15 VITALS
HEART RATE: 74 BPM | OXYGEN SATURATION: 96 % | DIASTOLIC BLOOD PRESSURE: 65 MMHG | WEIGHT: 201 LBS | SYSTOLIC BLOOD PRESSURE: 135 MMHG | RESPIRATION RATE: 18 BRPM | BODY MASS INDEX: 32.44 KG/M2

## 2024-07-15 LAB
ANION GAP SERPL CALCULATED.3IONS-SCNC: 15 MMOL/L (ref 7–16)
BNP SERPL-MCNC: 1362 PG/ML (ref 0–125)
BUN SERPL-MCNC: 41 MG/DL (ref 6–23)
CALCIUM SERPL-MCNC: 8.7 MG/DL (ref 8.6–10.2)
CHLORIDE SERPL-SCNC: 96 MMOL/L (ref 98–107)
CO2 SERPL-SCNC: 27 MMOL/L (ref 22–29)
CREAT SERPL-MCNC: 1.4 MG/DL (ref 0.7–1.2)
GFR, ESTIMATED: 55 ML/MIN/1.73M2
GLUCOSE SERPL-MCNC: 118 MG/DL (ref 74–99)
POTASSIUM SERPL-SCNC: 3.5 MMOL/L (ref 3.5–5)
SODIUM SERPL-SCNC: 138 MMOL/L (ref 132–146)

## 2024-07-15 PROCEDURE — 83880 ASSAY OF NATRIURETIC PEPTIDE: CPT

## 2024-07-15 PROCEDURE — 80048 BASIC METABOLIC PNL TOTAL CA: CPT

## 2024-07-15 PROCEDURE — 99214 OFFICE O/P EST MOD 30 MIN: CPT

## 2024-07-15 NOTE — PROGRESS NOTES
Leatha Abraham, APRN - CNS  Nurse Practitioner  Specialty: Cardiology     Result Encounter Note     Signed     Date of Service: 7/15/2024  1:15 PM     Signed         CHF clinic visit and labs reviewed   Weight down 13 lbs from Friday   Euvolemic on RN exam      BNP 1601>>1362     Return Bumex to 2 mg daily  Remainder of medications the same   Follow up as scheduled, sooner if needed             Pt.'s wife. Petra, notified of above orders.  Petra repeated orders and verbalized understanding.  
consulted  [] Prescription assistance information given   [] [facility] medication assistance program information given   [] Sample medications provided to patient to help bridge gap until affordability N/A      Additional Notes:             Scheduled to follow up in CHF clinic on:   Future Appointments   Date Time Provider Department Center   7/31/2024 12:45 PM [facility] CHF ROOM 1 [facility]   9/16/2024  9:30 AM Oly Crespo MD [facility]

## 2024-07-15 NOTE — PLAN OF CARE
Problem: Chronic Conditions and Co-morbidities  Goal: Patient's chronic conditions and co-morbidity symptoms are monitored and maintained or improved  7/15/2024 1325 by Sherley Driver RN  Flowsheets (Taken 7/15/2024 1325)  Care Plan - Patient's Chronic Conditions and Co-Morbidity Symptoms are Monitored and Maintained or Improved: Monitor and assess patient's chronic conditions and comorbid symptoms for stability, deterioration, or improvement  7/15/2024 1315 by Sherley Driver RN  Flowsheets (Taken 7/15/2024 1315)  Care Plan - Patient's Chronic Conditions and Co-Morbidity Symptoms are Monitored and Maintained or Improved: Monitor and assess patient's chronic conditions and comorbid symptoms for stability, deterioration, or improvement

## 2024-07-15 NOTE — PLAN OF CARE
Problem: Chronic Conditions and Co-morbidities  Goal: Patient's chronic conditions and co-morbidity symptoms are monitored and maintained or improved  Flowsheets (Taken 7/15/2024 1315)  Care Plan - Patient's Chronic Conditions and Co-Morbidity Symptoms are Monitored and Maintained or Improved: Monitor and assess patient's chronic conditions and comorbid symptoms for stability, deterioration, or improvement

## 2024-07-15 NOTE — RESULT ENCOUNTER NOTE
CHF clinic visit and labs reviewed   Weight down 13 lbs from Friday   Euvolemic on RN exam     BNP 1601>>1362    Return Bumex to 2 mg daily  Remainder of medications the same   Follow up as scheduled, sooner if needed

## 2024-07-31 ENCOUNTER — HOSPITAL ENCOUNTER (OUTPATIENT)
Dept: OTHER | Age: 69
Setting detail: THERAPIES SERIES
Discharge: HOME | End: 2024-07-31

## 2024-07-31 VITALS
HEART RATE: 80 BPM | WEIGHT: 210 LBS | BODY MASS INDEX: 33.89 KG/M2 | DIASTOLIC BLOOD PRESSURE: 58 MMHG | RESPIRATION RATE: 18 BRPM | SYSTOLIC BLOOD PRESSURE: 132 MMHG | OXYGEN SATURATION: 94 %

## 2024-07-31 LAB
ANION GAP SERPL CALCULATED.3IONS-SCNC: 12 MMOL/L (ref 7–16)
BNP SERPL-MCNC: 1251 PG/ML (ref 0–125)
BUN SERPL-MCNC: 36 MG/DL (ref 6–23)
CALCIUM SERPL-MCNC: 8.5 MG/DL (ref 8.6–10.2)
CHLORIDE SERPL-SCNC: 96 MMOL/L (ref 98–107)
CO2 SERPL-SCNC: 25 MMOL/L (ref 22–29)
CREAT SERPL-MCNC: 1.4 MG/DL (ref 0.7–1.2)
GFR, ESTIMATED: 56 ML/MIN/1.73M2
GLUCOSE SERPL-MCNC: 275 MG/DL (ref 74–99)
POTASSIUM SERPL-SCNC: 4.1 MMOL/L (ref 3.5–5)
SODIUM SERPL-SCNC: 133 MMOL/L (ref 132–146)

## 2024-07-31 PROCEDURE — 6360000002 HC RX W HCPCS: Performed by: INTERNAL MEDICINE

## 2024-07-31 PROCEDURE — 83880 ASSAY OF NATRIURETIC PEPTIDE: CPT

## 2024-07-31 PROCEDURE — 2580000003 HC RX 258: Performed by: INTERNAL MEDICINE

## 2024-07-31 PROCEDURE — 99214 OFFICE O/P EST MOD 30 MIN: CPT

## 2024-07-31 PROCEDURE — 96374 THER/PROPH/DIAG INJ IV PUSH: CPT

## 2024-07-31 PROCEDURE — 80048 BASIC METABOLIC PNL TOTAL CA: CPT

## 2024-07-31 RX ORDER — SODIUM CHLORIDE 0.9 % (FLUSH) 0.9 %
5-40 SYRINGE (ML) INJECTION ONCE
Status: COMPLETED | OUTPATIENT
Start: 2024-07-31 | End: 2024-07-31

## 2024-07-31 RX ORDER — FUROSEMIDE 10 MG/ML
40 INJECTION INTRAMUSCULAR; INTRAVENOUS ONCE
Status: COMPLETED | OUTPATIENT
Start: 2024-07-31 | End: 2024-07-31

## 2024-07-31 RX ADMIN — FUROSEMIDE 40 MG: 10 INJECTION, SOLUTION INTRAMUSCULAR; INTRAVENOUS at 13:08

## 2024-07-31 RX ADMIN — SODIUM CHLORIDE, PRESERVATIVE FREE 10 ML: 5 INJECTION INTRAVENOUS at 13:07

## 2024-07-31 NOTE — PLAN OF CARE
Problem: Chronic Conditions and Co-morbidities  Goal: Patient's chronic conditions and co-morbidity symptoms are monitored and maintained or improved  Flowsheets (Taken 7/31/2024 1301)  Care Plan - Patient's Chronic Conditions and Co-Morbidity Symptoms are Monitored and Maintained or Improved: Monitor and assess patient's chronic conditions and comorbid symptoms for stability, deterioration, or improvement

## 2024-07-31 NOTE — PROGRESS NOTES
extremity, Left lower extremity  RLE Edema: Trace, Pitting  LLE Edema: Trace, Pitting        Musculoskeletal  RL Extremity: Weakness  LL Extremity: Weakness     Psychosocial  Psychosocial (WDL): Within Defined Limits              Pulse: 80                 LAB DATA:  BMP:  Sodium (mmol/L)   Date Value   07/15/2024 138   07/12/2024 135   06/21/2024 135     Potassium (mmol/L)   Date Value   07/15/2024 3.5   07/12/2024 4.2   06/21/2024 3.8     Potassium reflex Magnesium (mmol/L)   Date Value   01/08/2023 5.1 (H)   01/08/2023 5.6 (H)   12/25/2022 6.5 (H)     Chloride (mmol/L)   Date Value   07/15/2024 96 (L)   07/12/2024 101   06/21/2024 94 (L)     CO2 (mmol/L)   Date Value   07/15/2024 27   07/12/2024 26   06/21/2024 28     BUN (mg/dL)   Date Value   07/15/2024 41 (H)   07/12/2024 37 (H)   06/21/2024 40 (H)     Creatinine (mg/dL)   Date Value   07/15/2024 1.4 (H)   07/12/2024 1.3 (H)   06/21/2024 1.5 (H)     Glucose (mg/dL)   Date Value   07/15/2024 118 (H)   07/12/2024 155 (H)   06/21/2024 163 (H)   04/18/2012 133 (H)   10/25/2011 138 (H)   01/28/2011 162 (H)     Calcium (mg/dL)   Date Value   07/15/2024 8.7   07/12/2024 8.9   06/21/2024 9.2     BNP:  Pro-BNP (pg/mL)   Date Value   07/15/2024 1,362 (H)   07/12/2024 1,601 (H)   06/21/2024 1,070 (H)      CBC:  WBC (k/uL)   Date Value   06/19/2024 6.6     Hemoglobin (g/dL)   Date Value   06/19/2024 9.7 (L)     Hematocrit (%)   Date Value   06/19/2024 30.2 (L)     Platelets (k/uL)   Date Value   06/19/2024 210     Iron Studies:  Ferritin (ng/mL)   Date Value   05/28/2024 101     Iron (ug/dL)   Date Value   05/28/2024 60     TIBC (ug/dL)   Date Value   05/28/2024 342     Hepatic:  AST (U/L)   Date Value   12/21/2023 26     ALT (U/L)   Date Value   12/21/2023 23     Total Bilirubin (mg/dL)   Date Value   12/21/2023 1.5 (H)     Alkaline Phosphatase (U/L)   Date Value   12/21/2023 134 (H)     INR:  INR (no units)   Date Value   06/19/2024 2.6         Wt Readings from Last 3

## 2024-08-01 ENCOUNTER — TELEPHONE (OUTPATIENT)
Dept: OTHER | Age: 69
End: 2024-08-01

## 2024-08-01 DIAGNOSIS — I50.22 CHRONIC SYSTOLIC (CONGESTIVE) HEART FAILURE (HCC): ICD-10-CM

## 2024-08-01 DIAGNOSIS — I50.32 HEART FAILURE WITH IMPROVED EJECTION FRACTION (HFIMPEF) (HCC): Primary | ICD-10-CM

## 2024-08-01 RX ORDER — BUMETANIDE 2 MG/1
2 TABLET ORAL 2 TIMES DAILY
Qty: 90 TABLET | Refills: 1 | Status: SHIPPED | OUTPATIENT
Start: 2024-08-01

## 2024-08-01 NOTE — TELEPHONE ENCOUNTER
Called patient to discuss medication changes. Spoke with is wife Petra.     Ethel Vickers APRN - Willow Villar RN  Labs and CHF clinic note reviewed  Return to Bumex 2 mg BID  Stay hydrated  Follow up labs in 1 week    Thank you    Petra expressed verbal understanding and repeated orders back correctly. Encouraged to call the clinic back with any questions.

## 2024-08-01 NOTE — RESULT ENCOUNTER NOTE
Labs and CHF clinic note reviewed  Return to Bumex 2 mg BID  Stay hydrated  Follow up labs in 1 week     Thank you

## 2024-08-07 ENCOUNTER — HOSPITAL ENCOUNTER (OUTPATIENT)
Age: 69
Discharge: HOME | End: 2024-08-07
Payer: MEDICARE

## 2024-08-07 DIAGNOSIS — I50.32 HEART FAILURE WITH IMPROVED EJECTION FRACTION (HFIMPEF) (HCC): ICD-10-CM

## 2024-08-07 LAB
ANION GAP SERPL CALCULATED.3IONS-SCNC: 18 MMOL/L (ref 7–16)
BUN SERPL-MCNC: 55 MG/DL (ref 6–23)
CALCIUM SERPL-MCNC: 9.2 MG/DL (ref 8.6–10.2)
CHLORIDE SERPL-SCNC: 95 MMOL/L (ref 98–107)
CO2 SERPL-SCNC: 27 MMOL/L (ref 22–29)
CREAT SERPL-MCNC: 1.5 MG/DL (ref 0.7–1.2)
GFR, ESTIMATED: 48 ML/MIN/1.73M2
GLUCOSE SERPL-MCNC: 207 MG/DL (ref 74–99)
POTASSIUM SERPL-SCNC: 4.2 MMOL/L (ref 3.5–5)
SODIUM SERPL-SCNC: 140 MMOL/L (ref 132–146)

## 2024-08-07 PROCEDURE — 80048 BASIC METABOLIC PNL TOTAL CA: CPT

## 2024-08-08 ENCOUNTER — TELEPHONE (OUTPATIENT)
Dept: OTHER | Age: 69
End: 2024-08-08

## 2024-08-08 NOTE — TELEPHONE ENCOUNTER
Called out to patient to inform them of their labs, patient wasn't there , wife will relay the message.

## 2024-08-08 NOTE — TELEPHONE ENCOUNTER
----- Message from JACQUELINE Carvalho - CNP sent at 8/8/2024  2:38 PM EDT -----  Please let patient know that labs were reviewed since increasing bumex  Little rise in kidney function, please make sure that he is staying hydrated  Follow up in CHF Clinic as scheduled next week     Thank you

## 2024-08-08 NOTE — RESULT ENCOUNTER NOTE
Please let patient know that labs were reviewed since increasing bumex  Little rise in kidney function, please make sure that he is staying hydrated  Follow up in CHF Clinic as scheduled next week     Thank you

## 2024-08-16 ENCOUNTER — HOSPITAL ENCOUNTER (OUTPATIENT)
Dept: OTHER | Age: 69
Setting detail: THERAPIES SERIES
Discharge: HOME | End: 2024-08-16
Payer: MEDICARE

## 2024-08-16 VITALS
WEIGHT: 201 LBS | BODY MASS INDEX: 32.44 KG/M2 | RESPIRATION RATE: 18 BRPM | OXYGEN SATURATION: 98 % | DIASTOLIC BLOOD PRESSURE: 58 MMHG | HEART RATE: 68 BPM | SYSTOLIC BLOOD PRESSURE: 126 MMHG

## 2024-08-16 LAB
ANION GAP SERPL CALCULATED.3IONS-SCNC: 12 MMOL/L (ref 7–16)
BNP SERPL-MCNC: 1090 PG/ML (ref 0–125)
BUN SERPL-MCNC: 44 MG/DL (ref 6–23)
CALCIUM SERPL-MCNC: 9.4 MG/DL (ref 8.6–10.2)
CHLORIDE SERPL-SCNC: 94 MMOL/L (ref 98–107)
CO2 SERPL-SCNC: 28 MMOL/L (ref 22–29)
CREAT SERPL-MCNC: 1.2 MG/DL (ref 0.7–1.2)
GFR, ESTIMATED: 63 ML/MIN/1.73M2
GLUCOSE SERPL-MCNC: 284 MG/DL (ref 74–99)
POTASSIUM SERPL-SCNC: 4.3 MMOL/L (ref 3.5–5)
SODIUM SERPL-SCNC: 134 MMOL/L (ref 132–146)

## 2024-08-16 PROCEDURE — 80048 BASIC METABOLIC PNL TOTAL CA: CPT

## 2024-08-16 PROCEDURE — 83880 ASSAY OF NATRIURETIC PEPTIDE: CPT

## 2024-08-16 PROCEDURE — 99214 OFFICE O/P EST MOD 30 MIN: CPT

## 2024-08-16 NOTE — PLAN OF CARE
Problem: Chronic Conditions and Co-morbidities  Goal: Patient's chronic conditions and co-morbidity symptoms are monitored and maintained or improved  Flowsheets (Taken 8/16/2024 1158)  Care Plan - Patient's Chronic Conditions and Co-Morbidity Symptoms are Monitored and Maintained or Improved: Monitor and assess patient's chronic conditions and comorbid symptoms for stability, deterioration, or improvement

## 2024-08-16 NOTE — PROGRESS NOTES
Congestive Heart Failure Clinic   [facility]       Referring Provider: -    Primary Care Physician: Oly Crespo MD   Cardiologist: -DR. MERCER  Nephrologist: Dr. Sánchez      HISTORY OF PRESENT ILLNESS:     Wander Rocha is a 69 y.o. (1955) male with a history of HFpEF, most recent EF:  Lab Results   Component Value Date    LVEF 65 08/23/2022 7/12/24  no new echo   He presents to the CHF clinic for ongoing evaluation and monitoring of heart failure.    In the CHF clinic today he denies any adverse symptoms except:  [] Dizziness or lightheadedness   [] Syncope or near syncope  [] Recent Fall  [] Shortness of breath at rest   [x] Dyspnea with exertion  [] Decline in functional capacity (unable to perform activities they had previously been able to do)  [] Fatigue   [] Orthopnea  [] PND  [] Nocturnal cough  [] Hemoptysis  [] Chest pain, pressure, or discomfort  [] Palpitations  [] Abdominal distention  [] Abdominal bloating  [] Early satiety  [] Blood in stool   [] Diarrhea  [] Constipation  [] Nausea/Vomiting  [] Decreased urinary response to oral diuretic   [] Scrotal swelling   [x]  SLIGHT Lower extremity edema  [] Used PRN doses of oral diuretic   [] Weight gain           Wt Readings from Last 3 Encounters:   08/16/24 91.2 kg (201 lb)   07/31/24 95.3 kg (210 lb)   07/15/24 91.2 kg (201 lb)           SOCIAL HISTORY:  [x] Denies tobacco, alcohol or illicit drug abuse  [] Tobacco use:  [] ETOH use:  [] Illicit drug use:        MEDICATIONS:    No Known Allergies    Current Outpatient Medications:     bumetanide (BUMEX) 2 MG tablet, Take 1 tablet by mouth 2 times daily, Disp: 90 tablet, Rfl: 1    levothyroxine (SYNTHROID) 100 MCG tablet, Take 1 tablet by mouth Daily, Disp: 90 tablet, Rfl: 1    sertraline (ZOLOFT) 50 MG tablet, Take 1 tablet by mouth daily, Disp: 90 tablet, Rfl: 1    metoprolol succinate (TOPROL XL) 25 MG extended release tablet, Take 1 tablet by mouth

## 2024-09-06 ENCOUNTER — HOSPITAL ENCOUNTER (OUTPATIENT)
Dept: OTHER | Age: 69
Setting detail: THERAPIES SERIES
Discharge: HOME | End: 2024-09-06
Payer: MEDICARE

## 2024-09-06 VITALS
OXYGEN SATURATION: 96 % | SYSTOLIC BLOOD PRESSURE: 143 MMHG | HEART RATE: 78 BPM | BODY MASS INDEX: 32.68 KG/M2 | WEIGHT: 202.5 LBS | DIASTOLIC BLOOD PRESSURE: 85 MMHG

## 2024-09-06 LAB
ANION GAP SERPL CALCULATED.3IONS-SCNC: 11 MMOL/L (ref 7–16)
BNP SERPL-MCNC: 972 PG/ML (ref 0–125)
BUN SERPL-MCNC: 42 MG/DL (ref 6–23)
CALCIUM SERPL-MCNC: 9.4 MG/DL (ref 8.6–10.2)
CHLORIDE SERPL-SCNC: 97 MMOL/L (ref 98–107)
CO2 SERPL-SCNC: 27 MMOL/L (ref 22–29)
CREAT SERPL-MCNC: 1.2 MG/DL (ref 0.7–1.2)
GFR, ESTIMATED: 67 ML/MIN/1.73M2
GLUCOSE SERPL-MCNC: 363 MG/DL (ref 74–99)
POTASSIUM SERPL-SCNC: 4.3 MMOL/L (ref 3.5–5)
SODIUM SERPL-SCNC: 135 MMOL/L (ref 132–146)

## 2024-09-06 PROCEDURE — 99214 OFFICE O/P EST MOD 30 MIN: CPT

## 2024-09-06 PROCEDURE — 83880 ASSAY OF NATRIURETIC PEPTIDE: CPT

## 2024-09-06 PROCEDURE — 80048 BASIC METABOLIC PNL TOTAL CA: CPT

## 2024-09-06 NOTE — PLAN OF CARE
Problem: Chronic Conditions and Co-morbidities  Goal: Patient's chronic conditions and co-morbidity symptoms are monitored and maintained or improved  Outcome: Progressing  Flowsheets (Taken 9/6/2024 1016)  Care Plan - Patient's Chronic Conditions and Co-Morbidity Symptoms are Monitored and Maintained or Improved: Monitor and assess patient's chronic conditions and comorbid symptoms for stability, deterioration, or improvement

## 2024-09-06 NOTE — PROGRESS NOTES
medications  [] CHF CHW consulted  [] Prescription assistance information given   [] [facility] medication assistance program information given   [] Sample medications provided to patient to help bridge gap until affordability N/A      Additional Notes:     Patient euvolemic on exam. Last seen 8/16/24 and up 1 lbs since that visit. Patient denies any issues or complaints at this time. Follow up made for 1 month or PRN in clinic.           Scheduled to follow up in CHF clinic on:   Future Appointments   Date Time Provider Department Center   9/16/2024  9:30 AM Oly Crespo MD [facility] DEP   10/11/2024 10:00 AM [facility] CHF ROOM 2 [facility]

## 2024-09-13 PROCEDURE — 93294 REM INTERROG EVL PM/LDLS PM: CPT | Performed by: INTERNAL MEDICINE

## 2024-09-13 PROCEDURE — 93296 REM INTERROG EVL PM/IDS: CPT | Performed by: INTERNAL MEDICINE

## 2024-09-16 ENCOUNTER — OFFICE VISIT (OUTPATIENT)
Dept: INTERNAL MEDICINE | Age: 69
End: 2024-09-16
Payer: MEDICARE

## 2024-09-16 VITALS
HEIGHT: 67 IN | BODY MASS INDEX: 31.74 KG/M2 | DIASTOLIC BLOOD PRESSURE: 72 MMHG | WEIGHT: 202.2 LBS | SYSTOLIC BLOOD PRESSURE: 117 MMHG | RESPIRATION RATE: 18 BRPM | HEART RATE: 74 BPM | TEMPERATURE: 97.1 F | OXYGEN SATURATION: 98 %

## 2024-09-16 DIAGNOSIS — D64.9 ANEMIA, UNSPECIFIED TYPE: ICD-10-CM

## 2024-09-16 DIAGNOSIS — E11.69 TYPE 2 DIABETES MELLITUS WITH OBESITY (HCC): ICD-10-CM

## 2024-09-16 DIAGNOSIS — E66.9 TYPE 2 DIABETES MELLITUS WITH OBESITY (HCC): ICD-10-CM

## 2024-09-16 DIAGNOSIS — Z79.4 TYPE 2 DIABETES MELLITUS WITH HYPERGLYCEMIA, WITH LONG-TERM CURRENT USE OF INSULIN (HCC): Primary | ICD-10-CM

## 2024-09-16 DIAGNOSIS — E11.43 TYPE 2 DIABETES MELLITUS WITH DIABETIC AUTONOMIC NEUROPATHY, WITH LONG-TERM CURRENT USE OF INSULIN (HCC): ICD-10-CM

## 2024-09-16 DIAGNOSIS — E03.9 HYPOTHYROIDISM, UNSPECIFIED TYPE: ICD-10-CM

## 2024-09-16 DIAGNOSIS — F33.0 MAJOR DEPRESSIVE DISORDER, RECURRENT, MILD (HCC): ICD-10-CM

## 2024-09-16 DIAGNOSIS — E11.65 TYPE 2 DIABETES MELLITUS WITH HYPERGLYCEMIA, WITH LONG-TERM CURRENT USE OF INSULIN (HCC): Primary | ICD-10-CM

## 2024-09-16 DIAGNOSIS — I48.19 PERSISTENT ATRIAL FIBRILLATION (HCC): ICD-10-CM

## 2024-09-16 DIAGNOSIS — Z79.4 TYPE 2 DIABETES MELLITUS WITH DIABETIC AUTONOMIC NEUROPATHY, WITH LONG-TERM CURRENT USE OF INSULIN (HCC): ICD-10-CM

## 2024-09-16 DIAGNOSIS — I50.22 CHRONIC SYSTOLIC (CONGESTIVE) HEART FAILURE (HCC): ICD-10-CM

## 2024-09-16 DIAGNOSIS — M1A.9XX0 CHRONIC GOUT WITHOUT TOPHUS, UNSPECIFIED CAUSE, UNSPECIFIED SITE: ICD-10-CM

## 2024-09-16 PROCEDURE — 3051F HG A1C>EQUAL 7.0%<8.0%: CPT

## 2024-09-16 PROCEDURE — 83036 HEMOGLOBIN GLYCOSYLATED A1C: CPT

## 2024-09-16 PROCEDURE — 3017F COLORECTAL CA SCREEN DOC REV: CPT

## 2024-09-16 PROCEDURE — 2022F DILAT RTA XM EVC RTNOPTHY: CPT

## 2024-09-16 PROCEDURE — 3074F SYST BP LT 130 MM HG: CPT

## 2024-09-16 PROCEDURE — 3078F DIAST BP <80 MM HG: CPT

## 2024-09-16 PROCEDURE — G8427 DOCREV CUR MEDS BY ELIG CLIN: HCPCS

## 2024-09-16 PROCEDURE — 1036F TOBACCO NON-USER: CPT

## 2024-09-16 PROCEDURE — G8417 CALC BMI ABV UP PARAM F/U: HCPCS

## 2024-09-16 PROCEDURE — 1123F ACP DISCUSS/DSCN MKR DOCD: CPT

## 2024-09-16 PROCEDURE — 99214 OFFICE O/P EST MOD 30 MIN: CPT

## 2024-09-16 PROCEDURE — 99212 OFFICE O/P EST SF 10 MIN: CPT

## 2024-09-16 RX ORDER — BLOOD SUGAR DIAGNOSTIC
STRIP MISCELLANEOUS
Qty: 200 EACH | Refills: 1 | Status: SHIPPED | OUTPATIENT
Start: 2024-09-16

## 2024-09-16 RX ORDER — METOPROLOL SUCCINATE 25 MG/1
25 TABLET, EXTENDED RELEASE ORAL DAILY
Qty: 90 TABLET | Refills: 1 | Status: SHIPPED | OUTPATIENT
Start: 2024-09-16

## 2024-09-16 RX ORDER — LEVOTHYROXINE SODIUM 25 UG/1
25 TABLET ORAL DAILY
Qty: 90 TABLET | Refills: 1 | Status: SHIPPED | OUTPATIENT
Start: 2024-09-16

## 2024-09-16 RX ORDER — LEVOTHYROXINE SODIUM 100 UG/1
100 TABLET ORAL DAILY
Qty: 90 TABLET | Refills: 1 | Status: SHIPPED | OUTPATIENT
Start: 2024-09-16

## 2024-09-16 RX ORDER — INSULIN GLARGINE 100 [IU]/ML
25 INJECTION, SOLUTION SUBCUTANEOUS 2 TIMES DAILY
Qty: 5 ADJUSTABLE DOSE PRE-FILLED PEN SYRINGE | Refills: 5 | Status: SHIPPED | OUTPATIENT
Start: 2024-09-16

## 2024-09-16 RX ORDER — INSULIN GLARGINE 100 [IU]/ML
25 INJECTION, SOLUTION SUBCUTANEOUS 2 TIMES DAILY
Qty: 30 ML | Refills: 4 | Status: SHIPPED
Start: 2024-09-16 | End: 2024-09-16

## 2024-09-16 RX ORDER — ALLOPURINOL 100 MG/1
200 TABLET ORAL DAILY
Qty: 180 TABLET | Refills: 1 | Status: SHIPPED | OUTPATIENT
Start: 2024-09-16

## 2024-09-16 RX ORDER — ATORVASTATIN CALCIUM 40 MG/1
TABLET, FILM COATED ORAL
Qty: 90 TABLET | Refills: 1 | Status: SHIPPED | OUTPATIENT
Start: 2024-09-16

## 2024-09-16 RX ORDER — BLOOD SUGAR DIAGNOSTIC
STRIP MISCELLANEOUS
Qty: 100 EACH | Refills: 1 | Status: SHIPPED | OUTPATIENT
Start: 2024-09-16

## 2024-09-16 RX ORDER — LANCETS 30 GAUGE
EACH MISCELLANEOUS
Qty: 200 EACH | Refills: 0 | Status: SHIPPED | OUTPATIENT
Start: 2024-09-16

## 2024-09-16 ASSESSMENT — PATIENT HEALTH QUESTIONNAIRE - PHQ9: DEPRESSION UNABLE TO ASSESS: PT REFUSES

## 2024-09-18 ENCOUNTER — TELEPHONE (OUTPATIENT)
Dept: INTERNAL MEDICINE | Age: 69
End: 2024-09-18

## 2024-09-18 DIAGNOSIS — I50.22 CHRONIC SYSTOLIC (CONGESTIVE) HEART FAILURE (HCC): ICD-10-CM

## 2024-09-26 ENCOUNTER — HOSPITAL ENCOUNTER (OUTPATIENT)
Age: 69
Discharge: HOME | End: 2024-09-26
Payer: MEDICARE

## 2024-09-26 DIAGNOSIS — E03.9 HYPOTHYROIDISM, UNSPECIFIED TYPE: ICD-10-CM

## 2024-09-26 LAB
ANION GAP SERPL CALCULATED.3IONS-SCNC: 15 MMOL/L (ref 7–16)
BASOPHILS # BLD: 0.05 K/UL (ref 0–0.2)
BASOPHILS NFR BLD: 1 % (ref 0–2)
BUN SERPL-MCNC: 34 MG/DL (ref 6–23)
CALCIUM SERPL-MCNC: 9.6 MG/DL (ref 8.6–10.2)
CHLORIDE SERPL-SCNC: 96 MMOL/L (ref 98–107)
CO2 SERPL-SCNC: 27 MMOL/L (ref 22–29)
CREAT SERPL-MCNC: 1.2 MG/DL (ref 0.7–1.2)
CREAT UR-MCNC: 75.5 MG/DL (ref 40–278)
EOSINOPHIL # BLD: 0.33 K/UL (ref 0.05–0.5)
EOSINOPHILS RELATIVE PERCENT: 6 % (ref 0–6)
ERYTHROCYTE [DISTWIDTH] IN BLOOD BY AUTOMATED COUNT: 14.6 % (ref 11.5–15)
GFR, ESTIMATED: 66 ML/MIN/1.73M2
GLUCOSE SERPL-MCNC: 191 MG/DL (ref 74–99)
HCT VFR BLD AUTO: 31.9 % (ref 37–54)
HGB BLD-MCNC: 10.4 G/DL (ref 12.5–16.5)
IMM GRANULOCYTES # BLD AUTO: 0.14 K/UL (ref 0–0.58)
IMM GRANULOCYTES NFR BLD: 2 % (ref 0–5)
LYMPHOCYTES NFR BLD: 0.87 K/UL (ref 1.5–4)
LYMPHOCYTES RELATIVE PERCENT: 15 % (ref 20–42)
MAGNESIUM SERPL-MCNC: 1.6 MG/DL (ref 1.6–2.6)
MCH RBC QN AUTO: 30.9 PG (ref 26–35)
MCHC RBC AUTO-ENTMCNC: 32.6 G/DL (ref 32–34.5)
MCV RBC AUTO: 94.7 FL (ref 80–99.9)
MONOCYTES NFR BLD: 0.58 K/UL (ref 0.1–0.95)
MONOCYTES NFR BLD: 10 % (ref 2–12)
NEUTROPHILS NFR BLD: 66 % (ref 43–80)
NEUTS SEG NFR BLD: 3.76 K/UL (ref 1.8–7.3)
PHOSPHATE SERPL-MCNC: 4.1 MG/DL (ref 2.5–4.5)
PLATELET # BLD AUTO: 189 K/UL (ref 130–450)
PMV BLD AUTO: 10.1 FL (ref 7–12)
POTASSIUM SERPL-SCNC: 4.3 MMOL/L (ref 3.5–5)
RBC # BLD AUTO: 3.37 M/UL (ref 3.8–5.8)
SODIUM SERPL-SCNC: 138 MMOL/L (ref 132–146)
TOTAL PROTEIN, URINE: 12 MG/DL (ref 0–12)
TSH SERPL DL<=0.05 MIU/L-ACNC: 11.72 UIU/ML (ref 0.27–4.2)
URATE SERPL-MCNC: 8.5 MG/DL (ref 3.4–7)
URINE TOTAL PROTEIN CREATININE RATIO: 0.16 (ref 0–0.2)
WBC OTHER # BLD: 5.7 K/UL (ref 4.5–11.5)

## 2024-09-26 PROCEDURE — 84100 ASSAY OF PHOSPHORUS: CPT

## 2024-09-26 PROCEDURE — 80048 BASIC METABOLIC PNL TOTAL CA: CPT

## 2024-09-26 PROCEDURE — 84550 ASSAY OF BLOOD/URIC ACID: CPT

## 2024-09-26 PROCEDURE — 85025 COMPLETE CBC W/AUTO DIFF WBC: CPT

## 2024-09-26 PROCEDURE — 82570 ASSAY OF URINE CREATININE: CPT

## 2024-09-26 PROCEDURE — 84443 ASSAY THYROID STIM HORMONE: CPT

## 2024-09-26 PROCEDURE — 83735 ASSAY OF MAGNESIUM: CPT

## 2024-09-26 PROCEDURE — 84156 ASSAY OF PROTEIN URINE: CPT

## 2024-10-04 ENCOUNTER — TELEPHONE (OUTPATIENT)
Dept: CARDIOLOGY CLINIC | Age: 69
End: 2024-10-04

## 2024-10-04 NOTE — TELEPHONE ENCOUNTER
Attempted to contact patient concerning recent labs and increasing his synthroid, no answer, left voicemail to return call to clinic.     Electronically signed by Oly Crespo MD on 10/4/2024 at 1:09 PM

## 2024-10-11 ENCOUNTER — HOSPITAL ENCOUNTER (OUTPATIENT)
Dept: OTHER | Age: 69
Setting detail: THERAPIES SERIES
Discharge: HOME | End: 2024-10-11
Payer: MEDICARE

## 2024-10-11 VITALS
SYSTOLIC BLOOD PRESSURE: 118 MMHG | OXYGEN SATURATION: 95 % | HEART RATE: 71 BPM | DIASTOLIC BLOOD PRESSURE: 68 MMHG | RESPIRATION RATE: 18 BRPM | BODY MASS INDEX: 31.79 KG/M2 | WEIGHT: 203 LBS

## 2024-10-11 LAB
ANION GAP SERPL CALCULATED.3IONS-SCNC: 12 MMOL/L (ref 7–16)
BNP SERPL-MCNC: 1077 PG/ML (ref 0–125)
BUN SERPL-MCNC: 45 MG/DL (ref 6–23)
CALCIUM SERPL-MCNC: 9.5 MG/DL (ref 8.6–10.2)
CHLORIDE SERPL-SCNC: 99 MMOL/L (ref 98–107)
CO2 SERPL-SCNC: 26 MMOL/L (ref 22–29)
CREAT SERPL-MCNC: 1.3 MG/DL (ref 0.7–1.2)
GFR, ESTIMATED: 57 ML/MIN/1.73M2
GLUCOSE SERPL-MCNC: 209 MG/DL (ref 74–99)
POTASSIUM SERPL-SCNC: 4.4 MMOL/L (ref 3.5–5)
SODIUM SERPL-SCNC: 137 MMOL/L (ref 132–146)

## 2024-10-11 PROCEDURE — 99214 OFFICE O/P EST MOD 30 MIN: CPT

## 2024-10-11 PROCEDURE — 83880 ASSAY OF NATRIURETIC PEPTIDE: CPT

## 2024-10-11 PROCEDURE — 80048 BASIC METABOLIC PNL TOTAL CA: CPT

## 2024-10-11 ASSESSMENT — PATIENT HEALTH QUESTIONNAIRE - PHQ9
SUM OF ALL RESPONSES TO PHQ QUESTIONS 1-9: 0
SUM OF ALL RESPONSES TO PHQ9 QUESTIONS 1 & 2: 0
SUM OF ALL RESPONSES TO PHQ QUESTIONS 1-9: 0
1. LITTLE INTEREST OR PLEASURE IN DOING THINGS: NOT AT ALL
2. FEELING DOWN, DEPRESSED OR HOPELESS: NOT AT ALL

## 2024-10-11 NOTE — PLAN OF CARE
Problem: Chronic Conditions and Co-morbidities  Goal: Patient's chronic conditions and co-morbidity symptoms are monitored and maintained or improved  Flowsheets (Taken 10/11/2024 1011)  Care Plan - Patient's Chronic Conditions and Co-Morbidity Symptoms are Monitored and Maintained or Improved: Monitor and assess patient's chronic conditions and comorbid symptoms for stability, deterioration, or improvement

## 2024-10-11 NOTE — PROGRESS NOTES
Congestive Heart Failure Clinic   [facility]       Referring Provider: -    Primary Care Physician: Oly Crespo MD   Cardiologist: -DR. MERCER  Nephrologist: Dr. Sánchez      HISTORY OF PRESENT ILLNESS:     Wander Rocha is a 69 y.o. (1955) male with a history of HFpEF, most recent EF:  Lab Results   Component Value Date    LVEF 65 08/23/2022 7/12/24  no new echo   He presents to the CHF clinic for ongoing evaluation and monitoring of heart failure.    In the CHF clinic today he denies any adverse symptoms except:  [] Dizziness or lightheadedness   [] Syncope or near syncope  [] Recent Fall  [] Shortness of breath at rest   [x] Dyspnea with exertion (occasionally with climbing stairs)  [] Decline in functional capacity (unable to perform activities they had previously been able to do)  [] Fatigue   [] Orthopnea  [] PND  [] Nocturnal cough  [] Hemoptysis  [] Chest pain, pressure, or discomfort  [] Palpitations  [] Abdominal distention  [] Abdominal bloating  [] Early satiety  [] Blood in stool   [] Diarrhea  [] Constipation  [] Nausea/Vomiting  [] Decreased urinary response to oral diuretic   [] Scrotal swelling   []  SLIGHT Lower extremity edema  [] Used PRN doses of oral diuretic   [] Weight gain           Wt Readings from Last 3 Encounters:   10/11/24 92.1 kg (203 lb)   09/16/24 91.7 kg (202 lb 3.2 oz)   09/06/24 91.9 kg (202 lb 8 oz)           SOCIAL HISTORY:  [x] Denies tobacco, alcohol or illicit drug abuse  [] Tobacco use:  [] ETOH use:  [] Illicit drug use:        MEDICATIONS:    No Known Allergies    Current Outpatient Medications:     Potassium 99 MG TABS, Take 1 tablet by mouth daily PLACE GO, Disp: , Rfl:     levothyroxine (SYNTHROID) 100 MCG tablet, Take 1 tablet by mouth Daily, Disp: 90 tablet, Rfl: 1    allopurinol (ZYLOPRIM) 100 MG tablet, Take 2 tablets by mouth daily, Disp: 180 tablet, Rfl: 1    apixaban (ELIQUIS) 5 MG TABS tablet, Take 1 tablet

## 2024-11-12 ENCOUNTER — HOSPITAL ENCOUNTER (OUTPATIENT)
Dept: OTHER | Age: 69
Setting detail: THERAPIES SERIES
Discharge: HOME | End: 2024-11-12
Payer: MEDICARE

## 2024-11-12 VITALS
WEIGHT: 204 LBS | RESPIRATION RATE: 18 BRPM | SYSTOLIC BLOOD PRESSURE: 141 MMHG | HEART RATE: 80 BPM | OXYGEN SATURATION: 96 % | BODY MASS INDEX: 31.95 KG/M2 | DIASTOLIC BLOOD PRESSURE: 53 MMHG

## 2024-11-12 LAB
ANION GAP SERPL CALCULATED.3IONS-SCNC: 11 MMOL/L (ref 7–16)
BNP SERPL-MCNC: 1000 PG/ML (ref 0–125)
BUN SERPL-MCNC: 47 MG/DL (ref 6–23)
CALCIUM SERPL-MCNC: 9.8 MG/DL (ref 8.6–10.2)
CHLORIDE SERPL-SCNC: 95 MMOL/L (ref 98–107)
CO2 SERPL-SCNC: 28 MMOL/L (ref 22–29)
CREAT SERPL-MCNC: 1.5 MG/DL (ref 0.7–1.2)
GFR, ESTIMATED: 48 ML/MIN/1.73M2
GLUCOSE SERPL-MCNC: 250 MG/DL (ref 74–99)
POTASSIUM SERPL-SCNC: 4.5 MMOL/L (ref 3.5–5)
SODIUM SERPL-SCNC: 134 MMOL/L (ref 132–146)

## 2024-11-12 PROCEDURE — 83880 ASSAY OF NATRIURETIC PEPTIDE: CPT

## 2024-11-12 PROCEDURE — 80048 BASIC METABOLIC PNL TOTAL CA: CPT

## 2024-11-12 PROCEDURE — 99214 OFFICE O/P EST MOD 30 MIN: CPT

## 2024-11-12 NOTE — PROGRESS NOTES
Congestive Heart Failure Clinic   [facility]       Referring Provider: -    Primary Care Physician: Oly Crespo MD   Cardiologist: -DR. MERCER  Nephrologist: Dr. Sánchez      HISTORY OF PRESENT ILLNESS:     Wander Rocha is a 69 y.o. (1955) male with a history of HFpEF, most recent EF:  Lab Results   Component Value Date    LVEF 65 08/23/2022 7/12/24  no new echo   He presents to the CHF clinic for ongoing evaluation and monitoring of heart failure.    In the CHF clinic today he denies any adverse symptoms except:  [] Dizziness or lightheadedness   [] Syncope or near syncope  [] Recent Fall  [] Shortness of breath at rest   [x] Dyspnea with exertion (occasionally with climbing stairs)  [] Decline in functional capacity (unable to perform activities they had previously been able to do)  [] Fatigue   [] Orthopnea  [] PND  [] Nocturnal cough  [] Hemoptysis  [] Chest pain, pressure, or discomfort  [] Palpitations  [] Abdominal distention  [] Abdominal bloating  [] Early satiety  [] Blood in stool   [] Diarrhea  [] Constipation  [] Nausea/Vomiting  [] Decreased urinary response to oral diuretic   [] Scrotal swelling   []  SLIGHT Lower extremity edema  [] Used PRN doses of oral diuretic   [] Weight gain           Wt Readings from Last 3 Encounters:   11/12/24 92.5 kg (204 lb)   10/11/24 92.1 kg (203 lb)   09/16/24 91.7 kg (202 lb 3.2 oz)           SOCIAL HISTORY:  [x] Denies tobacco, alcohol or illicit drug abuse  [] Tobacco use:  [] ETOH use:  [] Illicit drug use:        MEDICATIONS:    No Known Allergies    Current Outpatient Medications:     Potassium 99 MG TABS, Take 1 tablet by mouth daily PLACE GO, Disp: , Rfl:     levothyroxine (SYNTHROID) 100 MCG tablet, Take 1 tablet by mouth Daily, Disp: 90 tablet, Rfl: 1    allopurinol (ZYLOPRIM) 100 MG tablet, Take 2 tablets by mouth daily, Disp: 180 tablet, Rfl: 1    apixaban (ELIQUIS) 5 MG TABS tablet, Take 1 tablet by

## 2024-12-10 ENCOUNTER — HOSPITAL ENCOUNTER (OUTPATIENT)
Dept: OTHER | Age: 69
Setting detail: THERAPIES SERIES
Discharge: HOME | End: 2024-12-10
Payer: MEDICARE

## 2024-12-10 VITALS
DIASTOLIC BLOOD PRESSURE: 63 MMHG | BODY MASS INDEX: 31.79 KG/M2 | HEART RATE: 77 BPM | RESPIRATION RATE: 18 BRPM | WEIGHT: 203 LBS | SYSTOLIC BLOOD PRESSURE: 132 MMHG | OXYGEN SATURATION: 95 %

## 2024-12-10 DIAGNOSIS — Z79.4 TYPE 2 DIABETES MELLITUS WITH HYPERGLYCEMIA, WITH LONG-TERM CURRENT USE OF INSULIN (HCC): ICD-10-CM

## 2024-12-10 DIAGNOSIS — E11.65 TYPE 2 DIABETES MELLITUS WITH HYPERGLYCEMIA, WITH LONG-TERM CURRENT USE OF INSULIN (HCC): ICD-10-CM

## 2024-12-10 LAB
ANION GAP SERPL CALCULATED.3IONS-SCNC: 9 MMOL/L (ref 7–16)
BNP SERPL-MCNC: 971 PG/ML (ref 0–125)
BUN SERPL-MCNC: 50 MG/DL (ref 6–23)
CALCIUM SERPL-MCNC: 10 MG/DL (ref 8.6–10.2)
CHLORIDE SERPL-SCNC: 96 MMOL/L (ref 98–107)
CO2 SERPL-SCNC: 29 MMOL/L (ref 22–29)
CREAT SERPL-MCNC: 1.4 MG/DL (ref 0.7–1.2)
GFR, ESTIMATED: 55 ML/MIN/1.73M2
GLUCOSE SERPL-MCNC: 292 MG/DL (ref 74–99)
POTASSIUM SERPL-SCNC: 4 MMOL/L (ref 3.5–5)
SODIUM SERPL-SCNC: 134 MMOL/L (ref 132–146)

## 2024-12-10 PROCEDURE — 83880 ASSAY OF NATRIURETIC PEPTIDE: CPT

## 2024-12-10 PROCEDURE — 80048 BASIC METABOLIC PNL TOTAL CA: CPT

## 2024-12-10 PROCEDURE — 99214 OFFICE O/P EST MOD 30 MIN: CPT

## 2024-12-10 RX ORDER — INSULIN GLARGINE 100 [IU]/ML
INJECTION, SOLUTION SUBCUTANEOUS
Qty: 60 ML | Refills: 0 | OUTPATIENT
Start: 2024-12-10

## 2024-12-10 NOTE — PROGRESS NOTES
information given   [] Sample medications provided to patient to help bridge gap until affordability N/A      Additional Notes:      Patient presents for follow up today. No complaints of sob. Slight LE edema. No abdominal distention/bloating. No JVD on assessment. Follow up scheduled for 2 months, encouraged patient to call the clinic if they need an appointment sooner.     Scheduled to follow up in CHF clinic on:   Future Appointments   Date Time Provider Department Center   12/10/2024  9:15 AM [facility] CHF ROOM 4 [facility]   1/6/2025  8:30 AM Oly Crespo MD [facility] DEP   1/7/2025  8:30 AM SCHEDULE, DEVICE CLINIC 1 [facility]   1/7/2025  8:30 AM Thelma Ham APRN - CNP ELECTRO PHYS [facility]   2/4/2025  9:15 AM [facility] CHF ROOM 1 [facility]

## 2024-12-10 NOTE — PLAN OF CARE
Problem: Chronic Conditions and Co-morbidities  Goal: Patient's chronic conditions and co-morbidity symptoms are monitored and maintained or improved  Flowsheets (Taken 12/10/2024 0902)  Care Plan - Patient's Chronic Conditions and Co-Morbidity Symptoms are Monitored and Maintained or Improved: Monitor and assess patient's chronic conditions and comorbid symptoms for stability, deterioration, or improvement     
Home

## 2024-12-12 LAB — DIABETIC RETINOPATHY: NEGATIVE

## 2024-12-17 PROCEDURE — 93294 REM INTERROG EVL PM/LDLS PM: CPT | Performed by: INTERNAL MEDICINE

## 2024-12-17 PROCEDURE — 93296 REM INTERROG EVL PM/IDS: CPT | Performed by: INTERNAL MEDICINE

## 2025-01-03 NOTE — PROGRESS NOTES
Father     Amyotrophic lateral sclerosis Father     Cancer Brother     High Blood Pressure Brother     Diabetes Brother      There is no family history of sudden cardiac arrest    Social History     Tobacco Use    Smoking status: Former     Current packs/day: 0.00     Average packs/day: 0.1 packs/day for 35.0 years (3.5 ttl pk-yrs)     Types: Cigarettes     Start date: 1969     Quit date: 2004     Years since quittin.1     Passive exposure: Past    Smokeless tobacco: Former     Quit date:    Substance Use Topics    Alcohol use: No     Alcohol/week: 0.0 standard drinks of alcohol       Current Outpatient Medications   Medication Sig Dispense Refill    atorvastatin (LIPITOR) 40 MG tablet TAKE ONE TABLET BY MOUTH DAILY 90 tablet 1    apixaban (ELIQUIS) 5 MG TABS tablet Take 1 tablet by mouth 2 times daily 180 tablet 2    metoprolol succinate (TOPROL XL) 25 MG extended release tablet Take 1 tablet by mouth daily 90 tablet 1    levothyroxine (SYNTHROID) 100 MCG tablet Take 1 tablet by mouth Daily 90 tablet 1    bumetanide (BUMEX) 2 MG tablet Take 1 tablet by mouth 2 times daily 90 tablet 1    sertraline (ZOLOFT) 50 MG tablet Take 1 tablet by mouth daily 90 tablet 1    allopurinol (ZYLOPRIM) 100 MG tablet Take 2 tablets by mouth daily 180 tablet 1    ammonium lactate (LAC-HYDRIN) 12 % lotion Apply topically as needed to dry skin. 225 g 1    hydrocortisone (ALA-CANDELARIA) 1 % cream Apply topically 2 times daily to bilateral wrists/base of thumb for rash. 30 g 1    levothyroxine (SYNTHROID) 25 MCG tablet Take 2 tablets by mouth daily In combination with the 100mcg synthroid; total 150mcg of synthroid daily. 180 tablet 1    Potassium 99 MG TABS Take 1 tablet by mouth daily PLACE GO      blood glucose test strips (ONETOUCH VERIO) strip TEST IN THE MORNING AND IN THE EVENING 200 each 1    Insulin Pen Needle (PEN NEEDLES) 32G X 6 MM MISC Take insulin daily 100 each 1    Lancets MISC Give Delica lancets.

## 2025-01-04 DIAGNOSIS — E03.9 HYPOTHYROIDISM, UNSPECIFIED TYPE: Primary | ICD-10-CM

## 2025-01-05 NOTE — PROGRESS NOTES
[facility]      SUBJECTIVE:  Wander Rocha (:  1955) is a 69 y.o. male here for evaluation of the following chief complaint(s):  Hypertension and Diabetes (Blood glucose this AM was 193)    HPI: Follow-up of chronic conditions.     Afib on Eliquis  No falls, easy bruising, no blood in urine or stool.     Recurrent epistaxis, L nare   Multiple times weekly, bleeds ~3 hours at a time. Happens more frequently during winter but still occurs weekly during summer, has been using saline spray and petroleum jelly, air humidifier, without relief. On exam there is a prominent vessel on L septum with scab present. Referral to ENT for possible intervention. Check CBC.       Sinus node dysfunction s/p DCPM    HFpEF   Follows with CHF clinic once weekly to once every 2 weeks  Cardiologist Dr. Monik Alegremex 2mg daily - no edema on exam.  Discussed possibility of adding ACE/ARB, discontinuing bumex, and adding jardiance previous appointment however pt wanted to discuss with other providers. Will discuss with patient and his wife via telephone this afternoon.   No CP, SOB, cough, palpitations.      Hypothyroidism   Is feeling more fatigued and tired. No constipation.   Synthroid 125. Previous TSH 11.72 2024 however unable was unable to get a hold of pt via phone and message went un-returned. [place] TSH in clinic today with plans to discuss with pt and wife this afternoon when she is home from work.       HTN  Controlled on Toprol 25   No HA, blurred vision, CP.      HLD   Lipitor 40  Lipid panel 2/15/2024. LDL 68, HDL 22, triglycerides 171.      CKD   Dr. Bass 1 time per year.   5/15/2024 Cr 1.2, EGFR 68.      DMT T2, long-term insulin use   A1c 8.3% (7.2%).   Basaglar 25U BID - not taking PM dose. Encouraged compliance especially with increasing A1c. Pt states he has been eating sweet prior to bed.   BG range from 160-200s, admits it has been due to diet

## 2025-01-06 ENCOUNTER — TELEPHONE (OUTPATIENT)
Dept: INTERNAL MEDICINE | Age: 70
End: 2025-01-06

## 2025-01-06 ENCOUNTER — OFFICE VISIT (OUTPATIENT)
Dept: INTERNAL MEDICINE | Age: 70
End: 2025-01-06
Payer: MEDICARE

## 2025-01-06 VITALS
OXYGEN SATURATION: 96 % | HEIGHT: 67 IN | DIASTOLIC BLOOD PRESSURE: 63 MMHG | RESPIRATION RATE: 20 BRPM | BODY MASS INDEX: 32.8 KG/M2 | TEMPERATURE: 97.4 F | WEIGHT: 209 LBS | SYSTOLIC BLOOD PRESSURE: 134 MMHG | HEART RATE: 86 BPM

## 2025-01-06 DIAGNOSIS — E11.40 TYPE 2 DIABETES MELLITUS WITH DIABETIC NEUROPATHY, WITH LONG-TERM CURRENT USE OF INSULIN (HCC): ICD-10-CM

## 2025-01-06 DIAGNOSIS — R04.0 EPISTAXIS, RECURRENT: ICD-10-CM

## 2025-01-06 DIAGNOSIS — Z79.4 TYPE 2 DIABETES MELLITUS WITH DIABETIC NEUROPATHY, WITH LONG-TERM CURRENT USE OF INSULIN (HCC): ICD-10-CM

## 2025-01-06 DIAGNOSIS — D64.9 NORMOCYTIC ANEMIA: ICD-10-CM

## 2025-01-06 DIAGNOSIS — E03.9 HYPOTHYROIDISM, UNSPECIFIED TYPE: Primary | ICD-10-CM

## 2025-01-06 DIAGNOSIS — L85.3 DRY SKIN: ICD-10-CM

## 2025-01-06 DIAGNOSIS — I10 ESSENTIAL HYPERTENSION: Chronic | ICD-10-CM

## 2025-01-06 DIAGNOSIS — M1A.9XX0 CHRONIC GOUT WITHOUT TOPHUS, UNSPECIFIED CAUSE, UNSPECIFIED SITE: ICD-10-CM

## 2025-01-06 DIAGNOSIS — I48.19 PERSISTENT ATRIAL FIBRILLATION (HCC): ICD-10-CM

## 2025-01-06 DIAGNOSIS — E78.2 MIXED HYPERLIPIDEMIA: Chronic | ICD-10-CM

## 2025-01-06 DIAGNOSIS — I50.22 CHRONIC SYSTOLIC (CONGESTIVE) HEART FAILURE (HCC): ICD-10-CM

## 2025-01-06 DIAGNOSIS — E11.43 TYPE 2 DIABETES MELLITUS WITH DIABETIC AUTONOMIC NEUROPATHY, WITH LONG-TERM CURRENT USE OF INSULIN (HCC): ICD-10-CM

## 2025-01-06 DIAGNOSIS — Z79.4 TYPE 2 DIABETES MELLITUS WITH DIABETIC AUTONOMIC NEUROPATHY, WITH LONG-TERM CURRENT USE OF INSULIN (HCC): ICD-10-CM

## 2025-01-06 DIAGNOSIS — F33.0 MAJOR DEPRESSIVE DISORDER, RECURRENT, MILD (HCC): ICD-10-CM

## 2025-01-06 DIAGNOSIS — G47.33 OSA (OBSTRUCTIVE SLEEP APNEA): Chronic | ICD-10-CM

## 2025-01-06 DIAGNOSIS — E66.9 RESTRICTIVE LUNG DISEASE SECONDARY TO OBESITY: ICD-10-CM

## 2025-01-06 DIAGNOSIS — J98.4 RESTRICTIVE LUNG DISEASE SECONDARY TO OBESITY: ICD-10-CM

## 2025-01-06 PROBLEM — I50.31 ACUTE HEART FAILURE WITH PRESERVED EJECTION FRACTION (HFPEF) (HCC): Status: RESOLVED | Noted: 2021-09-17 | Resolved: 2025-01-06

## 2025-01-06 LAB
BASOPHILS ABSOLUTE: 0.05 K/UL (ref 0–0.2)
BASOPHILS RELATIVE PERCENT: 1 % (ref 0–2)
EOSINOPHILS ABSOLUTE: 0.35 K/UL (ref 0.05–0.5)
EOSINOPHILS RELATIVE PERCENT: 5 % (ref 0–6)
HCT VFR BLD CALC: 28.8 % (ref 37–54)
HEMOGLOBIN: 9.6 G/DL (ref 12.5–16.5)
IMMATURE GRANULOCYTES %: 3 % (ref 0–5)
IMMATURE GRANULOCYTES ABSOLUTE: 0.23 K/UL (ref 0–0.58)
LYMPHOCYTES ABSOLUTE: 1.08 K/UL (ref 1.5–4)
LYMPHOCYTES RELATIVE PERCENT: 15 % (ref 20–42)
MCH RBC QN AUTO: 31.3 PG (ref 26–35)
MCHC RBC AUTO-ENTMCNC: 33.3 G/DL (ref 32–34.5)
MCV RBC AUTO: 93.8 FL (ref 80–99.9)
MONOCYTES ABSOLUTE: 0.6 K/UL (ref 0.1–0.95)
MONOCYTES RELATIVE PERCENT: 8 % (ref 2–12)
NEUTROPHILS ABSOLUTE: 5.11 K/UL (ref 1.8–7.3)
NEUTROPHILS RELATIVE PERCENT: 69 % (ref 43–80)
PDW BLD-RTO: 15.3 % (ref 11.5–15)
PLATELET # BLD: 202 K/UL (ref 130–450)
PMV BLD AUTO: 9.8 FL (ref 7–12)
RBC # BLD: 3.07 M/UL (ref 3.8–5.8)
T4 FREE: 1.4 NG/DL (ref 0.9–1.7)
TSH SERPL DL<=0.05 MIU/L-ACNC: 5.62 UIU/ML (ref 0.27–4.2)
WBC # BLD: 7.4 K/UL (ref 4.5–11.5)

## 2025-01-06 PROCEDURE — M1308 PR FLU IMMUNIZE NO ADMIN: HCPCS

## 2025-01-06 PROCEDURE — G8427 DOCREV CUR MEDS BY ELIG CLIN: HCPCS

## 2025-01-06 PROCEDURE — 99212 OFFICE O/P EST SF 10 MIN: CPT

## 2025-01-06 PROCEDURE — 99213 OFFICE O/P EST LOW 20 MIN: CPT

## 2025-01-06 PROCEDURE — 2022F DILAT RTA XM EVC RTNOPTHY: CPT

## 2025-01-06 PROCEDURE — 3078F DIAST BP <80 MM HG: CPT

## 2025-01-06 PROCEDURE — 3046F HEMOGLOBIN A1C LEVEL >9.0%: CPT

## 2025-01-06 PROCEDURE — 36415 COLL VENOUS BLD VENIPUNCTURE: CPT | Performed by: INTERNAL MEDICINE

## 2025-01-06 PROCEDURE — 3075F SYST BP GE 130 - 139MM HG: CPT

## 2025-01-06 PROCEDURE — 1123F ACP DISCUSS/DSCN MKR DOCD: CPT

## 2025-01-06 PROCEDURE — 1036F TOBACCO NON-USER: CPT

## 2025-01-06 PROCEDURE — 3017F COLORECTAL CA SCREEN DOC REV: CPT

## 2025-01-06 PROCEDURE — G8417 CALC BMI ABV UP PARAM F/U: HCPCS

## 2025-01-06 RX ORDER — AMMONIUM LACTATE 12 G/100G
LOTION TOPICAL
Qty: 225 G | Refills: 1 | Status: SHIPPED | OUTPATIENT
Start: 2025-01-06

## 2025-01-06 RX ORDER — LEVOTHYROXINE SODIUM 100 UG/1
100 TABLET ORAL DAILY
Qty: 90 TABLET | Refills: 1 | Status: SHIPPED | OUTPATIENT
Start: 2025-01-06

## 2025-01-06 RX ORDER — LEVOTHYROXINE SODIUM 25 UG/1
25 TABLET ORAL DAILY
Qty: 90 TABLET | Refills: 1 | Status: SHIPPED
Start: 2025-01-06 | End: 2025-01-06

## 2025-01-06 RX ORDER — LEVOTHYROXINE SODIUM 25 UG/1
50 TABLET ORAL DAILY
Qty: 180 TABLET | Refills: 1 | Status: SHIPPED | OUTPATIENT
Start: 2025-01-06

## 2025-01-06 RX ORDER — ALLOPURINOL 100 MG/1
200 TABLET ORAL DAILY
Qty: 180 TABLET | Refills: 1 | Status: SHIPPED | OUTPATIENT
Start: 2025-01-06

## 2025-01-06 RX ORDER — ATORVASTATIN CALCIUM 40 MG/1
TABLET, FILM COATED ORAL
Qty: 90 TABLET | Refills: 1 | Status: SHIPPED | OUTPATIENT
Start: 2025-01-06

## 2025-01-06 RX ORDER — METOPROLOL SUCCINATE 25 MG/1
25 TABLET, EXTENDED RELEASE ORAL DAILY
Qty: 90 TABLET | Refills: 1 | Status: SHIPPED | OUTPATIENT
Start: 2025-01-06

## 2025-01-06 RX ORDER — BENZOCAINE/MENTHOL 6 MG-10 MG
LOZENGE MUCOUS MEMBRANE
Qty: 30 G | Refills: 1 | Status: SHIPPED | OUTPATIENT
Start: 2025-01-06 | End: 2025-01-13

## 2025-01-06 RX ORDER — BUMETANIDE 2 MG/1
2 TABLET ORAL 2 TIMES DAILY
Qty: 90 TABLET | Refills: 1 | Status: SHIPPED | OUTPATIENT
Start: 2025-01-06

## 2025-01-06 ASSESSMENT — PATIENT HEALTH QUESTIONNAIRE - PHQ9
SUM OF ALL RESPONSES TO PHQ QUESTIONS 1-9: 7
1. LITTLE INTEREST OR PLEASURE IN DOING THINGS: MORE THAN HALF THE DAYS
7. TROUBLE CONCENTRATING ON THINGS, SUCH AS READING THE NEWSPAPER OR WATCHING TELEVISION: MORE THAN HALF THE DAYS
6. FEELING BAD ABOUT YOURSELF - OR THAT YOU ARE A FAILURE OR HAVE LET YOURSELF OR YOUR FAMILY DOWN: NOT AT ALL
SUM OF ALL RESPONSES TO PHQ QUESTIONS 1-9: 7
SUM OF ALL RESPONSES TO PHQ QUESTIONS 1-9: 7
3. TROUBLE FALLING OR STAYING ASLEEP: MORE THAN HALF THE DAYS
9. THOUGHTS THAT YOU WOULD BE BETTER OFF DEAD, OR OF HURTING YOURSELF: NOT AT ALL
SUM OF ALL RESPONSES TO PHQ9 QUESTIONS 1 & 2: 2
10. IF YOU CHECKED OFF ANY PROBLEMS, HOW DIFFICULT HAVE THESE PROBLEMS MADE IT FOR YOU TO DO YOUR WORK, TAKE CARE OF THINGS AT HOME, OR GET ALONG WITH OTHER PEOPLE: NOT DIFFICULT AT ALL
8. MOVING OR SPEAKING SO SLOWLY THAT OTHER PEOPLE COULD HAVE NOTICED. OR THE OPPOSITE, BEING SO FIGETY OR RESTLESS THAT YOU HAVE BEEN MOVING AROUND A LOT MORE THAN USUAL: NOT AT ALL
SUM OF ALL RESPONSES TO PHQ QUESTIONS 1-9: 7
2. FEELING DOWN, DEPRESSED OR HOPELESS: NOT AT ALL
4. FEELING TIRED OR HAVING LITTLE ENERGY: SEVERAL DAYS
5. POOR APPETITE OR OVEREATING: NOT AT ALL

## 2025-01-06 NOTE — PROGRESS NOTES
[facility]  Internal Medicine Residency Clinic    Attending Physician Statement  I have discussed the case, including pertinent history and exam findings with the resident physician. I agree with the assessment, plan and orders as documented by the resident. I have reviewed the relevant PMHx, PSHx, FamHx, SocialHx, medications, and allergies and updated history as appropriate.    Patient presents for routine follow up of medical problems.  Case discussed with the PGY2  69-year-old  man with multiple medical issues  Data reviewed in detail  Compliance issues discussed with medications  Problems will be discussed with his spouse later this afternoon  Nosebleeds need to be addressed by ENT  He is on Eliquis for A-fib    Remainder of medical problems as per resident note.    Uli Viramontes MD  1/6/2025 9:30 AM

## 2025-01-06 NOTE — TELEPHONE ENCOUNTER
Called patient, no answer, left voicemail instructing to present to lab prior to today's appointment.     Electronically signed by Oly Crespo MD on 1/6/2025 at 7:50 AM

## 2025-01-06 NOTE — TELEPHONE ENCOUNTER
Called patient to discuss labs and increase in synthroid. No answer.    Electronically signed by Oly Crespo MD on 1/6/2025 at 2:04 PM

## 2025-01-06 NOTE — PATIENT INSTRUCTIONS
Dear Wander Rocha,        Thank you for coming to your appointment today. I hope we have addressed all of your needs.       Please make sure to do the following:  - Continue your medications as listed.  -I will call you this afternoon to discuss lab results and increasing your synthroid.     - Check your blood sugars in the morning and at night. Start using basaglar insulin in the morning and at night if your blood sugar reading is 180 and greater.    - Use the lac-hydrin lotion on your legs for dry flaking skin.   - Use the cortisone cream for your itching rash on the base of the thumbs/wrist.     - Referrals have been made to ENT doctor for your nose bleeds:  If you do not hear from the office in 1 week, please call the number listed.  - We will see each other again in 3 months.     Call for a sooner appointment if needed.     Have a great day!        Sincerely,  Oly Crespo MD   1/6/2025  8:34 AM

## 2025-01-07 ENCOUNTER — TELEPHONE (OUTPATIENT)
Dept: INTERNAL MEDICINE | Age: 70
End: 2025-01-07

## 2025-01-07 ENCOUNTER — OFFICE VISIT (OUTPATIENT)
Dept: NON INVASIVE DIAGNOSTICS | Age: 70
End: 2025-01-07
Payer: MEDICARE

## 2025-01-07 ENCOUNTER — TELEPHONE (OUTPATIENT)
Dept: CARDIOLOGY | Age: 70
End: 2025-01-07

## 2025-01-07 VITALS
OXYGEN SATURATION: 97 % | BODY MASS INDEX: 33.56 KG/M2 | HEART RATE: 71 BPM | RESPIRATION RATE: 18 BRPM | HEIGHT: 66 IN | DIASTOLIC BLOOD PRESSURE: 64 MMHG | WEIGHT: 208.8 LBS | TEMPERATURE: 97.5 F | SYSTOLIC BLOOD PRESSURE: 104 MMHG

## 2025-01-07 DIAGNOSIS — I48.19 PERSISTENT ATRIAL FIBRILLATION (HCC): Primary | Chronic | ICD-10-CM

## 2025-01-07 DIAGNOSIS — Z95.0 CARDIAC PACEMAKER IN SITU: ICD-10-CM

## 2025-01-07 DIAGNOSIS — I48.0 PAROXYSMAL ATRIAL FIBRILLATION (HCC): ICD-10-CM

## 2025-01-07 DIAGNOSIS — R94.31 ABNORMAL EKG: ICD-10-CM

## 2025-01-07 LAB — HBA1C MFR BLD: 8.9 % (ref 4–5.6)

## 2025-01-07 PROCEDURE — 1159F MED LIST DOCD IN RCRD: CPT | Performed by: NURSE PRACTITIONER

## 2025-01-07 PROCEDURE — 3017F COLORECTAL CA SCREEN DOC REV: CPT | Performed by: NURSE PRACTITIONER

## 2025-01-07 PROCEDURE — 3078F DIAST BP <80 MM HG: CPT | Performed by: NURSE PRACTITIONER

## 2025-01-07 PROCEDURE — G8427 DOCREV CUR MEDS BY ELIG CLIN: HCPCS | Performed by: NURSE PRACTITIONER

## 2025-01-07 PROCEDURE — M1308 PR FLU IMMUNIZE NO ADMIN: HCPCS | Performed by: NURSE PRACTITIONER

## 2025-01-07 PROCEDURE — 99214 OFFICE O/P EST MOD 30 MIN: CPT | Performed by: NURSE PRACTITIONER

## 2025-01-07 PROCEDURE — 1160F RVW MEDS BY RX/DR IN RCRD: CPT | Performed by: NURSE PRACTITIONER

## 2025-01-07 PROCEDURE — 1036F TOBACCO NON-USER: CPT | Performed by: NURSE PRACTITIONER

## 2025-01-07 PROCEDURE — 1123F ACP DISCUSS/DSCN MKR DOCD: CPT | Performed by: NURSE PRACTITIONER

## 2025-01-07 PROCEDURE — 93000 ELECTROCARDIOGRAM COMPLETE: CPT | Performed by: NURSE PRACTITIONER

## 2025-01-07 PROCEDURE — G8417 CALC BMI ABV UP PARAM F/U: HCPCS | Performed by: NURSE PRACTITIONER

## 2025-01-07 PROCEDURE — 3074F SYST BP LT 130 MM HG: CPT | Performed by: NURSE PRACTITIONER

## 2025-01-07 NOTE — TELEPHONE ENCOUNTER
Attempted to call patient to discuss labs and increase in synthroid. No answer. Voicemail left.     Electronically signed by Oly Crespo MD on 1/7/2025 at 8:25 AM

## 2025-01-07 NOTE — TELEPHONE ENCOUNTER
Called patient and l/m to schedule echo    Electronically signed by Jessica Bullard on 1/7/2025 at 10:29 AM

## 2025-01-07 NOTE — TELEPHONE ENCOUNTER
Attempted to call patient to discuss labs and increase of synthroid. No answer.     Called patient's pharmacy to ensure synthroid 25 rx was cancelled and that patient had not picked up meds yet, he has not. New script was received for synthroid 25 instructing patient to take 2 tablets to make the total of 150 synthroid daily.     I will continue to attempt to reach patient to inform him and his wife of change and will also mail a letter to inform of medication dosage change due to the difficulties of reaching him by phone again.     Electronically signed by Oly Crespo MD on 1/7/2025 at 10:14 AM

## 2025-01-07 NOTE — PROGRESS NOTES
Spoke with Petra (pt's wife) to inform her to increase pt's synthroid to 150 mcg. She states understanding. And informed new script for 50 mcg was sent to pharmacy when the 25 mcg runs out.

## 2025-02-04 ENCOUNTER — HOSPITAL ENCOUNTER (OUTPATIENT)
Dept: OTHER | Age: 70
Setting detail: THERAPIES SERIES
Discharge: HOME | End: 2025-02-04
Payer: MEDICARE

## 2025-02-04 VITALS
HEART RATE: 70 BPM | OXYGEN SATURATION: 95 % | BODY MASS INDEX: 33.92 KG/M2 | WEIGHT: 207 LBS | SYSTOLIC BLOOD PRESSURE: 126 MMHG | DIASTOLIC BLOOD PRESSURE: 61 MMHG | RESPIRATION RATE: 18 BRPM

## 2025-02-04 LAB
ANION GAP SERPL CALCULATED.3IONS-SCNC: 13 MMOL/L (ref 7–16)
BNP SERPL-MCNC: 1121 PG/ML (ref 0–125)
BUN SERPL-MCNC: 57 MG/DL (ref 6–23)
CALCIUM SERPL-MCNC: 9.6 MG/DL (ref 8.6–10.2)
CHLORIDE SERPL-SCNC: 93 MMOL/L (ref 98–107)
CO2 SERPL-SCNC: 26 MMOL/L (ref 22–29)
CREAT SERPL-MCNC: 1.4 MG/DL (ref 0.7–1.2)
GFR, ESTIMATED: 56 ML/MIN/1.73M2
GLUCOSE SERPL-MCNC: 312 MG/DL (ref 74–99)
POTASSIUM SERPL-SCNC: 4.3 MMOL/L (ref 3.5–5)
SODIUM SERPL-SCNC: 132 MMOL/L (ref 132–146)

## 2025-02-04 PROCEDURE — 99214 OFFICE O/P EST MOD 30 MIN: CPT

## 2025-02-04 PROCEDURE — 80048 BASIC METABOLIC PNL TOTAL CA: CPT

## 2025-02-04 PROCEDURE — 83880 ASSAY OF NATRIURETIC PEPTIDE: CPT

## 2025-02-04 ASSESSMENT — PATIENT HEALTH QUESTIONNAIRE - PHQ9
2. FEELING DOWN, DEPRESSED OR HOPELESS: NOT AT ALL
SUM OF ALL RESPONSES TO PHQ QUESTIONS 1-9: 2
1. LITTLE INTEREST OR PLEASURE IN DOING THINGS: MORE THAN HALF THE DAYS
SUM OF ALL RESPONSES TO PHQ QUESTIONS 1-9: 2
SUM OF ALL RESPONSES TO PHQ9 QUESTIONS 1 & 2: 2

## 2025-02-04 NOTE — PROGRESS NOTES
Congestive Heart Failure Clinic   [facility]       Referring Provider: -    Primary Care Physician: Oly Crespo MD   Cardiologist: -DR. MERCER  Nephrologist: Dr. Sánchez      HISTORY OF PRESENT ILLNESS:     Wander Rocha is a 69 y.o. (1955) male with a history of HFpEF, most recent EF:  Lab Results   Component Value Date    LVEF 65 08/23/2022 7/12/24  no new echo   He presents to the CHF clinic for ongoing evaluation and monitoring of heart failure.    In the CHF clinic today he denies any adverse symptoms except:  [] Dizziness or lightheadedness   [] Syncope or near syncope  [] Recent Fall  [] Shortness of breath at rest   [x] Dyspnea with exertion (occasionally with climbing stairs)  [] Decline in functional capacity (unable to perform activities they had previously been able to do)  [] Fatigue   [] Orthopnea  [] PND  [] Nocturnal cough  [] Hemoptysis  [] Chest pain, pressure, or discomfort  [] Palpitations  [] Abdominal distention  [] Abdominal bloating  [] Early satiety  [] Blood in stool   [] Diarrhea  [] Constipation  [] Nausea/Vomiting  [] Decreased urinary response to oral diuretic   [] Scrotal swelling   []  SLIGHT Lower extremity edema  [] Used PRN doses of oral diuretic   [] Weight gain       Wt Readings from Last 3 Encounters:   02/04/25 93.9 kg (207 lb)   01/07/25 94.7 kg (208 lb 12.8 oz)   01/06/25 94.8 kg (209 lb)       SOCIAL HISTORY:  [x] Denies tobacco, alcohol or illicit drug abuse  [] Tobacco use:  [] ETOH use:  [] Illicit drug use:        MEDICATIONS:    No Known Allergies    Current Outpatient Medications:     atorvastatin (LIPITOR) 40 MG tablet, TAKE ONE TABLET BY MOUTH DAILY, Disp: 90 tablet, Rfl: 1    apixaban (ELIQUIS) 5 MG TABS tablet, Take 1 tablet by mouth 2 times daily, Disp: 180 tablet, Rfl: 2    metoprolol succinate (TOPROL XL) 25 MG extended release tablet, Take 1 tablet by mouth daily, Disp: 90 tablet, Rfl: 1    levothyroxine

## 2025-02-04 NOTE — PLAN OF CARE
Problem: Chronic Conditions and Co-morbidities  Goal: Patient's chronic conditions and co-morbidity symptoms are monitored and maintained or improved  Flowsheets (Taken 2/4/2025 3392)  Care Plan - Patient's Chronic Conditions and Co-Morbidity Symptoms are Monitored and Maintained or Improved: Monitor and assess patient's chronic conditions and comorbid symptoms for stability, deterioration, or improvement

## 2025-02-07 ENCOUNTER — HOSPITAL ENCOUNTER (OUTPATIENT)
Dept: CARDIOLOGY | Age: 70
Discharge: HOME | End: 2025-02-09
Payer: COMMERCIAL

## 2025-02-07 VITALS
HEIGHT: 65 IN | WEIGHT: 207 LBS | BODY MASS INDEX: 34.49 KG/M2 | SYSTOLIC BLOOD PRESSURE: 126 MMHG | DIASTOLIC BLOOD PRESSURE: 61 MMHG

## 2025-02-07 DIAGNOSIS — R94.31 ABNORMAL EKG: ICD-10-CM

## 2025-02-07 DIAGNOSIS — I48.0 PAROXYSMAL ATRIAL FIBRILLATION (HCC): ICD-10-CM

## 2025-02-07 LAB
ECHO AO ASC DIAM: 2.6 CM
ECHO AO ASCENDING AORTA INDEX: 1.29 CM/M2
ECHO AV AREA PEAK VELOCITY: 1.4 CM2
ECHO AV AREA PLAN/BSA: 1.39 CM2/M2
ECHO AV AREA PLAN: 2.8 CM2
ECHO AV AREA VTI: 0.3 CM2
ECHO AV AREA/BSA PEAK VELOCITY: 0.7 CM2/M2
ECHO AV AREA/BSA VTI: 0.1 CM2/M2
ECHO AV CUSP MM: 2 CM
ECHO AV MEAN GRADIENT: 9 MMHG
ECHO AV MEAN VELOCITY: 1 M/S
ECHO AV PEAK GRADIENT: 18 MMHG
ECHO AV PEAK VELOCITY: 2.1 M/S
ECHO AV VELOCITY RATIO: 0.43
ECHO AV VTI: 254.1 CM
ECHO BSA: 2.07 M2
ECHO EST RA PRESSURE: 8 MMHG
ECHO IVC PROX: 2.7 CM
ECHO LA DIAMETER INDEX: 2.49 CM/M2
ECHO LA DIAMETER: 5 CM
ECHO LA VOL A-L A2C: 86 ML (ref 18–58)
ECHO LA VOL A-L A4C: 105 ML (ref 18–58)
ECHO LA VOL MOD A2C: 83 ML (ref 18–58)
ECHO LA VOL MOD A4C: 98 ML (ref 18–58)
ECHO LA VOLUME AREA LENGTH: 98 ML
ECHO LA VOLUME INDEX A-L A2C: 43 ML/M2 (ref 16–34)
ECHO LA VOLUME INDEX A-L A4C: 52 ML/M2 (ref 16–34)
ECHO LA VOLUME INDEX AREA LENGTH: 49 ML/M2 (ref 16–34)
ECHO LA VOLUME INDEX MOD A2C: 41 ML/M2 (ref 16–34)
ECHO LA VOLUME INDEX MOD A4C: 49 ML/M2 (ref 16–34)
ECHO LV EDV A2C: 189 ML
ECHO LV EDV A4C: 100 ML
ECHO LV EDV BP: 140 ML (ref 67–155)
ECHO LV EDV INDEX A4C: 50 ML/M2
ECHO LV EDV INDEX BP: 70 ML/M2
ECHO LV EDV NDEX A2C: 94 ML/M2
ECHO LV EJECTION FRACTION A2C: 53 %
ECHO LV EJECTION FRACTION A4C: 50 %
ECHO LV EJECTION FRACTION BIPLANE: 52 % (ref 55–100)
ECHO LV ESV A2C: 89 ML
ECHO LV ESV A4C: 50 ML
ECHO LV ESV BP: 67 ML (ref 22–58)
ECHO LV ESV INDEX A2C: 44 ML/M2
ECHO LV ESV INDEX A4C: 25 ML/M2
ECHO LV ESV INDEX BP: 33 ML/M2
ECHO LV FRACTIONAL SHORTENING: 31 % (ref 28–44)
ECHO LV INTERNAL DIMENSION DIASTOLE INDEX: 2.39 CM/M2
ECHO LV INTERNAL DIMENSION DIASTOLIC: 4.8 CM (ref 4.2–5.9)
ECHO LV INTERNAL DIMENSION SYSTOLIC INDEX: 1.64 CM/M2
ECHO LV INTERNAL DIMENSION SYSTOLIC: 3.3 CM
ECHO LV ISOVOLUMETRIC RELAXATION TIME (IVRT): 69.2 MS
ECHO LV IVSD: 1.1 CM (ref 0.6–1)
ECHO LV IVSS: 1.4 CM
ECHO LV MASS 2D: 194 G (ref 88–224)
ECHO LV MASS INDEX 2D: 96.5 G/M2 (ref 49–115)
ECHO LV POSTERIOR WALL DIASTOLIC: 1.1 CM (ref 0.6–1)
ECHO LV POSTERIOR WALL SYSTOLIC: 1.5 CM
ECHO LV RELATIVE WALL THICKNESS RATIO: 0.46
ECHO LVOT AREA: 3.5 CM2
ECHO LVOT AV VTI INDEX: 0.07
ECHO LVOT DIAM: 2.1 CM
ECHO LVOT MEAN GRADIENT: 1 MMHG
ECHO LVOT PEAK GRADIENT: 3 MMHG
ECHO LVOT PEAK VELOCITY: 0.9 M/S
ECHO LVOT STROKE VOLUME INDEX: 30.7 ML/M2
ECHO LVOT SV: 61.6 ML
ECHO LVOT VTI: 17.8 CM
ECHO MV "A" WAVE DURATION: 147.6 MSEC
ECHO MV A VELOCITY: 0.29 M/S
ECHO MV AREA PHT: 2.3 CM2
ECHO MV AREA VTI: 2.1 CM2
ECHO MV E DECELERATION TIME (DT): 203.3 MS
ECHO MV E VELOCITY: 1.16 M/S
ECHO MV E/A RATIO: 4
ECHO MV LVOT VTI INDEX: 1.63
ECHO MV MAX VELOCITY: 1.3 M/S
ECHO MV MEAN GRADIENT: 2 MMHG
ECHO MV MEAN VELOCITY: 0.5 M/S
ECHO MV PEAK GRADIENT: 6 MMHG
ECHO MV PRESSURE HALF TIME (PHT): 94.4 MS
ECHO MV VTI: 29.1 CM
ECHO PV MAX VELOCITY: 0.8 M/S
ECHO PV MEAN GRADIENT: 1 MMHG
ECHO PV MEAN VELOCITY: 0.5 M/S
ECHO PV PEAK GRADIENT: 3 MMHG
ECHO PV VTI: 15.4 CM
ECHO PVEIN PEAK D VELOCITY: 0.6 M/S
ECHO PVEIN PEAK S VELOCITY: 0.3 M/S
ECHO PVEIN S/D RATIO: 0.5
ECHO RIGHT VENTRICULAR SYSTOLIC PRESSURE (RVSP): 47 MMHG
ECHO RV BASAL DIMENSION: 4.5 CM
ECHO RV INTERNAL DIMENSION: 4 CM
ECHO RV LONGITUDINAL DIMENSION: 8.8 CM
ECHO RV MID DIMENSION: 3.4 CM
ECHO RV TAPSE: 1.2 CM (ref 1.7–?)
ECHO TV REGURGITANT MAX VELOCITY: 3.14 M/S
ECHO TV REGURGITANT PEAK GRADIENT: 39 MMHG

## 2025-02-07 PROCEDURE — 2500000003 HC RX 250 WO HCPCS: Performed by: INTERNAL MEDICINE

## 2025-02-07 PROCEDURE — 93306 TTE W/DOPPLER COMPLETE: CPT

## 2025-02-07 PROCEDURE — 6360000004 HC RX CONTRAST MEDICATION: Performed by: INTERNAL MEDICINE

## 2025-02-07 RX ORDER — SODIUM CHLORIDE 0.9 % (FLUSH) 0.9 %
10 SYRINGE (ML) INJECTION PRN
Status: DISCONTINUED | OUTPATIENT
Start: 2025-02-07 | End: 2025-02-10 | Stop reason: HOSPADM

## 2025-02-07 RX ADMIN — SULFUR HEXAFLUORIDE 2 ML: KIT at 15:28

## 2025-02-07 RX ADMIN — SODIUM CHLORIDE, PRESERVATIVE FREE 10 ML: 5 INJECTION INTRAVENOUS at 15:32

## 2025-02-10 LAB
ECHO AO ASC DIAM: 2.6 CM
ECHO AO ASCENDING AORTA INDEX: 1.29 CM/M2
ECHO AV AREA PEAK VELOCITY: 1.4 CM2
ECHO AV AREA PLAN/BSA: 1.39 CM2/M2
ECHO AV AREA PLAN: 2.8 CM2
ECHO AV AREA VTI: 0.3 CM2
ECHO AV AREA/BSA PEAK VELOCITY: 0.7 CM2/M2
ECHO AV AREA/BSA VTI: 0.1 CM2/M2
ECHO AV CUSP MM: 2 CM
ECHO AV MEAN GRADIENT: 9 MMHG
ECHO AV MEAN VELOCITY: 1 M/S
ECHO AV PEAK GRADIENT: 18 MMHG
ECHO AV PEAK VELOCITY: 2.1 M/S
ECHO AV VELOCITY RATIO: 0.43
ECHO AV VTI: 254.1 CM
ECHO BSA: 2.07 M2
ECHO EST RA PRESSURE: 8 MMHG
ECHO IVC PROX: 2.7 CM
ECHO LA DIAMETER INDEX: 2.49 CM/M2
ECHO LA DIAMETER: 5 CM
ECHO LA VOL A-L A2C: 86 ML (ref 18–58)
ECHO LA VOL A-L A4C: 105 ML (ref 18–58)
ECHO LA VOL MOD A2C: 83 ML (ref 18–58)
ECHO LA VOL MOD A4C: 98 ML (ref 18–58)
ECHO LA VOLUME AREA LENGTH: 98 ML
ECHO LA VOLUME INDEX A-L A2C: 43 ML/M2 (ref 16–34)
ECHO LA VOLUME INDEX A-L A4C: 52 ML/M2 (ref 16–34)
ECHO LA VOLUME INDEX AREA LENGTH: 49 ML/M2 (ref 16–34)
ECHO LA VOLUME INDEX MOD A2C: 41 ML/M2 (ref 16–34)
ECHO LA VOLUME INDEX MOD A4C: 49 ML/M2 (ref 16–34)
ECHO LV EDV A2C: 189 ML
ECHO LV EDV A4C: 100 ML
ECHO LV EDV BP: 140 ML (ref 67–155)
ECHO LV EDV INDEX A4C: 50 ML/M2
ECHO LV EDV INDEX BP: 70 ML/M2
ECHO LV EDV NDEX A2C: 94 ML/M2
ECHO LV EF PHYSICIAN: 45 %
ECHO LV EJECTION FRACTION A2C: 53 %
ECHO LV EJECTION FRACTION A4C: 50 %
ECHO LV EJECTION FRACTION BIPLANE: 52 % (ref 55–100)
ECHO LV ESV A2C: 89 ML
ECHO LV ESV A4C: 50 ML
ECHO LV ESV BP: 67 ML (ref 22–58)
ECHO LV ESV INDEX A2C: 44 ML/M2
ECHO LV ESV INDEX A4C: 25 ML/M2
ECHO LV ESV INDEX BP: 33 ML/M2
ECHO LV FRACTIONAL SHORTENING: 31 % (ref 28–44)
ECHO LV INTERNAL DIMENSION DIASTOLE INDEX: 2.39 CM/M2
ECHO LV INTERNAL DIMENSION DIASTOLIC: 4.8 CM (ref 4.2–5.9)
ECHO LV INTERNAL DIMENSION SYSTOLIC INDEX: 1.64 CM/M2
ECHO LV INTERNAL DIMENSION SYSTOLIC: 3.3 CM
ECHO LV ISOVOLUMETRIC RELAXATION TIME (IVRT): 69.2 MS
ECHO LV IVSD: 1.1 CM (ref 0.6–1)
ECHO LV IVSS: 1.4 CM
ECHO LV MASS 2D: 194 G (ref 88–224)
ECHO LV MASS INDEX 2D: 96.5 G/M2 (ref 49–115)
ECHO LV POSTERIOR WALL DIASTOLIC: 1.1 CM (ref 0.6–1)
ECHO LV POSTERIOR WALL SYSTOLIC: 1.5 CM
ECHO LV RELATIVE WALL THICKNESS RATIO: 0.46
ECHO LVOT AREA: 3.5 CM2
ECHO LVOT AV VTI INDEX: 0.07
ECHO LVOT DIAM: 2.1 CM
ECHO LVOT MEAN GRADIENT: 1 MMHG
ECHO LVOT PEAK GRADIENT: 3 MMHG
ECHO LVOT PEAK VELOCITY: 0.9 M/S
ECHO LVOT STROKE VOLUME INDEX: 30.7 ML/M2
ECHO LVOT SV: 61.6 ML
ECHO LVOT VTI: 17.8 CM
ECHO MV "A" WAVE DURATION: 147.6 MSEC
ECHO MV A VELOCITY: 0.29 M/S
ECHO MV AREA PHT: 2.3 CM2
ECHO MV AREA VTI: 2.1 CM2
ECHO MV E DECELERATION TIME (DT): 203.3 MS
ECHO MV E VELOCITY: 1.16 M/S
ECHO MV E/A RATIO: 4
ECHO MV LVOT VTI INDEX: 1.63
ECHO MV MAX VELOCITY: 1.3 M/S
ECHO MV MEAN GRADIENT: 2 MMHG
ECHO MV MEAN VELOCITY: 0.5 M/S
ECHO MV PEAK GRADIENT: 6 MMHG
ECHO MV PRESSURE HALF TIME (PHT): 94.4 MS
ECHO MV VTI: 29.1 CM
ECHO PV MAX VELOCITY: 0.8 M/S
ECHO PV MEAN GRADIENT: 1 MMHG
ECHO PV MEAN VELOCITY: 0.5 M/S
ECHO PV PEAK GRADIENT: 3 MMHG
ECHO PV VTI: 15.4 CM
ECHO PVEIN PEAK D VELOCITY: 0.6 M/S
ECHO PVEIN PEAK S VELOCITY: 0.3 M/S
ECHO PVEIN S/D RATIO: 0.5
ECHO RIGHT VENTRICULAR SYSTOLIC PRESSURE (RVSP): 47 MMHG
ECHO RV BASAL DIMENSION: 4.5 CM
ECHO RV INTERNAL DIMENSION: 4 CM
ECHO RV LONGITUDINAL DIMENSION: 8.8 CM
ECHO RV MID DIMENSION: 3.4 CM
ECHO RV TAPSE: 1.2 CM (ref 1.7–?)
ECHO TV REGURGITANT MAX VELOCITY: 3.14 M/S
ECHO TV REGURGITANT PEAK GRADIENT: 39 MMHG

## 2025-02-11 ENCOUNTER — TELEPHONE (OUTPATIENT)
Dept: NON INVASIVE DIAGNOSTICS | Age: 70
End: 2025-02-11

## 2025-02-11 DIAGNOSIS — R94.31 ABNORMAL ELECTROCARDIOGRAPHY: Primary | ICD-10-CM

## 2025-02-11 NOTE — TELEPHONE ENCOUNTER
LM to call office for results.    Electronically signed by Lupis Mantilla MA on 2/11/2025 at 9:25 AM

## 2025-02-11 NOTE — TELEPHONE ENCOUNTER
----- Message from Dr. Elsie Pereira MD sent at 2/10/2025 10:03 PM EST -----  Echo showed LV EF 45%. Please get nuclear stress test and ask the patient to come in for discussion regarding CRT-P upgrade with ANP or me after stress test results. Thanks.  ----- Message -----  From: Anthony Thorne DO  Sent: 2/10/2025   5:00 PM EST  To: Elsie Pereira MD

## 2025-02-11 NOTE — TELEPHONE ENCOUNTER
I spoke with the patient and his wife about the results and next steps. They are agreeable to having a nuclear stress test. I called the stress lab and scheduled the patient on 2/24/2025 @ 8:00 AM. The patient is scheduled for a follow up visit with ANP on 3/11/25.     Electronically signed by Lupis Mantilla MA on 2/11/2025 at 1:55 PM

## 2025-02-12 ENCOUNTER — TELEPHONE (OUTPATIENT)
Dept: CARDIOLOGY | Age: 70
End: 2025-02-12

## 2025-02-12 NOTE — TELEPHONE ENCOUNTER
Called patient and l/m to confirm his insurance in order to do a prior auth for stress test.    Electronically signed by Jessica Bullard on 2/12/2025 at 11:29 AM

## 2025-02-17 DIAGNOSIS — R94.31 ABNORMAL ELECTROCARDIOGRAPHY: Primary | ICD-10-CM

## 2025-02-20 ENCOUNTER — TELEPHONE (OUTPATIENT)
Dept: CARDIOLOGY | Age: 70
End: 2025-02-20

## 2025-02-20 NOTE — TELEPHONE ENCOUNTER
Left message on voice mail to remind patient of pharmacological stress test appointment on February 24, 2025 at 0800. Instructions for test,including holding all diabetic medications morning of test, and COVID-19 preprocedure information left on voice mail. Asked patient to call with any questions or if unable to keep appointment.

## 2025-02-24 ENCOUNTER — HOSPITAL ENCOUNTER (OUTPATIENT)
Dept: CARDIOLOGY | Age: 70
Discharge: HOME | End: 2025-02-26
Attending: INTERNAL MEDICINE
Payer: MEDICARE

## 2025-02-24 VITALS
WEIGHT: 207 LBS | SYSTOLIC BLOOD PRESSURE: 118 MMHG | DIASTOLIC BLOOD PRESSURE: 60 MMHG | HEIGHT: 67 IN | BODY MASS INDEX: 32.49 KG/M2 | HEART RATE: 62 BPM | RESPIRATION RATE: 18 BRPM

## 2025-02-24 DIAGNOSIS — R94.31 ABNORMAL ELECTROCARDIOGRAPHY: Primary | ICD-10-CM

## 2025-02-24 LAB
ECHO BSA: 2.11 M2
NUC STRESS EJECTION FRACTION: 54 %
STRESS BASELINE DIAS BP: 60 MMHG
STRESS BASELINE HR: 66 BPM
STRESS BASELINE SYS BP: 118 MMHG
STRESS ESTIMATED WORKLOAD: 1.1 METS
STRESS PEAK DIAS BP: 70 MMHG
STRESS PEAK SYS BP: 140 MMHG
STRESS PERCENT HR ACHIEVED: 52 %
STRESS POST PEAK HR: 77 BPM
STRESS RATE PRESSURE PRODUCT: NORMAL BPM*MMHG
STRESS TARGET HR: 149 BPM

## 2025-02-24 PROCEDURE — 93018 CV STRESS TEST I&R ONLY: CPT | Performed by: INTERNAL MEDICINE

## 2025-02-24 PROCEDURE — 6360000002 HC RX W HCPCS: Performed by: INTERNAL MEDICINE

## 2025-02-24 PROCEDURE — 2500000003 HC RX 250 WO HCPCS: Performed by: INTERNAL MEDICINE

## 2025-02-24 PROCEDURE — A9500 TC99M SESTAMIBI: HCPCS | Performed by: INTERNAL MEDICINE

## 2025-02-24 PROCEDURE — 78452 HT MUSCLE IMAGE SPECT MULT: CPT | Performed by: INTERNAL MEDICINE

## 2025-02-24 PROCEDURE — 93016 CV STRESS TEST SUPVJ ONLY: CPT | Performed by: INTERNAL MEDICINE

## 2025-02-24 PROCEDURE — 3430000000 HC RX DIAGNOSTIC RADIOPHARMACEUTICAL: Performed by: INTERNAL MEDICINE

## 2025-02-24 PROCEDURE — 93017 CV STRESS TEST TRACING ONLY: CPT

## 2025-02-24 RX ORDER — TETRAKIS(2-METHOXYISOBUTYLISOCYANIDE)COPPER(I) TETRAFLUOROBORATE 1 MG/ML
32.1 INJECTION, POWDER, LYOPHILIZED, FOR SOLUTION INTRAVENOUS
Status: COMPLETED | OUTPATIENT
Start: 2025-02-24 | End: 2025-02-24

## 2025-02-24 RX ORDER — SODIUM CHLORIDE 0.9 % (FLUSH) 0.9 %
10 SYRINGE (ML) INJECTION PRN
Status: DISCONTINUED | OUTPATIENT
Start: 2025-02-24 | End: 2025-02-27 | Stop reason: HOSPADM

## 2025-02-24 RX ORDER — REGADENOSON 0.08 MG/ML
0.4 INJECTION, SOLUTION INTRAVENOUS
Status: COMPLETED | OUTPATIENT
Start: 2025-02-24 | End: 2025-02-24

## 2025-02-24 RX ORDER — TETRAKIS(2-METHOXYISOBUTYLISOCYANIDE)COPPER(I) TETRAFLUOROBORATE 1 MG/ML
10.5 INJECTION, POWDER, LYOPHILIZED, FOR SOLUTION INTRAVENOUS
Status: COMPLETED | OUTPATIENT
Start: 2025-02-24 | End: 2025-02-24

## 2025-02-24 RX ADMIN — SODIUM CHLORIDE, PRESERVATIVE FREE 10 ML: 5 INJECTION INTRAVENOUS at 10:18

## 2025-02-24 RX ADMIN — Medication 32.1 MILLICURIE: at 10:17

## 2025-02-24 RX ADMIN — REGADENOSON 0.4 MG: 0.08 INJECTION, SOLUTION INTRAVENOUS at 10:17

## 2025-02-24 RX ADMIN — SODIUM CHLORIDE, PRESERVATIVE FREE 10 ML: 5 INJECTION INTRAVENOUS at 08:23

## 2025-02-24 RX ADMIN — Medication 10.5 MILLICURIE: at 08:23

## 2025-02-24 RX ADMIN — SODIUM CHLORIDE, PRESERVATIVE FREE 10 ML: 5 INJECTION INTRAVENOUS at 10:17

## 2025-02-25 ENCOUNTER — TELEPHONE (OUTPATIENT)
Dept: NON INVASIVE DIAGNOSTICS | Age: 70
End: 2025-02-25

## 2025-02-25 DIAGNOSIS — R94.39 ABNORMAL CARDIOVASCULAR STRESS TEST: Primary | ICD-10-CM

## 2025-02-25 NOTE — TELEPHONE ENCOUNTER
LM to call office for results.    Electronically signed by Lupis Mantilla MA on 2/25/2025 at 1:22 PM

## 2025-02-25 NOTE — TELEPHONE ENCOUNTER
----- Message from Dr. Elsie Pereira MD sent at 2/24/2025  1:07 PM EST -----  Nuclear stress test showed low risk result. Echo showed LV EF 45%. Please send him to see Cardiology for GDMT adjustment and if LV EF remains less than 50%, he would likely benefit from CRT-P upgrade. Thanks.  ----- Message -----  From: Giulia Tejada  Sent: 2/24/2025  12:34 PM EST  To: Elsie Pereira MD

## 2025-02-28 NOTE — TELEPHONE ENCOUNTER
LM on main line to call the office, I spoke with Petra his spouse and she is in [place]. She will have the patient call me.     Electronically signed by Lupis Mantilla MA on 2/28/2025 at 8:38 AM

## 2025-03-03 NOTE — TELEPHONE ENCOUNTER
The patient came in to the office. Results provided in person and referral placed to [facility] for GDMT management/adjustment. The patient verbalized understanding.     Electronically signed by Lupis Mantilla MA on 3/3/2025 at 11:35 AM

## 2025-03-14 ENCOUNTER — OFFICE VISIT (OUTPATIENT)
Dept: ENT CLINIC | Age: 70
End: 2025-03-14
Payer: MEDICARE

## 2025-03-14 VITALS
HEIGHT: 67 IN | WEIGHT: 211.4 LBS | OXYGEN SATURATION: 90 % | DIASTOLIC BLOOD PRESSURE: 60 MMHG | RESPIRATION RATE: 14 BRPM | SYSTOLIC BLOOD PRESSURE: 124 MMHG | BODY MASS INDEX: 33.18 KG/M2 | HEART RATE: 78 BPM

## 2025-03-14 DIAGNOSIS — J34.89 NASAL MUCOSA DRY: ICD-10-CM

## 2025-03-14 DIAGNOSIS — R04.0 EPISTAXIS: ICD-10-CM

## 2025-03-14 DIAGNOSIS — R09.02 HYPOXIA: Primary | ICD-10-CM

## 2025-03-14 PROCEDURE — G8427 DOCREV CUR MEDS BY ELIG CLIN: HCPCS | Performed by: OTOLARYNGOLOGY

## 2025-03-14 PROCEDURE — 3078F DIAST BP <80 MM HG: CPT | Performed by: OTOLARYNGOLOGY

## 2025-03-14 PROCEDURE — 1123F ACP DISCUSS/DSCN MKR DOCD: CPT | Performed by: OTOLARYNGOLOGY

## 2025-03-14 PROCEDURE — G8417 CALC BMI ABV UP PARAM F/U: HCPCS | Performed by: OTOLARYNGOLOGY

## 2025-03-14 PROCEDURE — 3017F COLORECTAL CA SCREEN DOC REV: CPT | Performed by: OTOLARYNGOLOGY

## 2025-03-14 PROCEDURE — 1159F MED LIST DOCD IN RCRD: CPT | Performed by: OTOLARYNGOLOGY

## 2025-03-14 PROCEDURE — 1036F TOBACCO NON-USER: CPT | Performed by: OTOLARYNGOLOGY

## 2025-03-14 PROCEDURE — 99204 OFFICE O/P NEW MOD 45 MIN: CPT | Performed by: OTOLARYNGOLOGY

## 2025-03-14 PROCEDURE — 3074F SYST BP LT 130 MM HG: CPT | Performed by: OTOLARYNGOLOGY

## 2025-03-14 NOTE — PROGRESS NOTES
Department of Otolaryngology  Office Consult Note  3/14/25          Subjective:        Chief Complaint:  had concerns including New Patient (Epistaxis ).     Patient ID: Wander Rocha is a 69 y.o. male.    HPI: Patient presents as  new patient for recurrent epistaixs. Patient has been on chronic O2 for the past 2 years drying out the nose. Had onset of epistaxis mostly from the left but bilaterally, lasted several hours at home. Is on baby asa and unknown blood thinner   Last nose bleed 2 weeks ago, they have been on and off for few months   Stopped using oxygen with improvement of symptoms       Review of Systems   Constitutional: Negative.    HENT:  Positive for nosebleeds. Negative for congestion, ear discharge, ear pain, hearing loss, rhinorrhea, sinus pressure, sinus pain, sore throat and tinnitus.    Respiratory:  Negative for cough, choking, shortness of breath, wheezing and stridor.    Cardiovascular:  Negative for chest pain.   Skin:  Negative for rash.   Allergic/Immunologic: Negative for environmental allergies and immunocompromised state.   Neurological:  Negative for dizziness, weakness, light-headedness and headaches.   Psychiatric/Behavioral:  Negative for confusion.    All other systems reviewed and are negative.        Past Medical History:   Diagnosis Date    RADHA (acute kidney injury) 3/10/2022    Atrial fibrillation (HCC)     Carpal tunnel syndrome     Colorectal polyps 4/15/2013    Diverticula of colon 4/15/2013    Encounter regarding vascular access for dialysis for ESRD ([facility]) 3/12/2022    Hyperlipidemia     Hypertension     Hypothyroidism     Lung nodule     Lung nodules 4/15/2013    Nocturnal hypoxemia due to obesity 8/8/2013    Obesity     NIKO (obstructive sleep apnea) 8/8/2013    [facility]    Osteoarthritis     Pacemaker     DOI 10/3/2017 [employer]; dual chamber; MRI conditional  Indication: Sinus node dysfunction     Pinched nerve     Lumbar    Restrictive lung disease

## 2025-03-17 NOTE — PROGRESS NOTES
93.4 kg (206 lb)   Height: 1.651 m (5' 5\")       Constitutional: well-developed, no acute distress  Eyes: conjunctivae normal, no xanthelasma   Ears, Nose, Throat: oral mucosa moist, no cyanosis   CV: no JVD. Regular rate and rhythm. Normal S1S2 and no S3. No murmurs, rubs, or gallops. PMI is nondisplaced  Lungs: clear to auscultation bilaterally, normal respiratory effort without used of accessory muscles  Abdomen: soft, non-tender, bowel sounds present, no masses or hepatomegaly   Musculoskeletal: no digital clubbing, no edema   Skin: warm, no rashes   Device site: left chest significant veinous distention around the device.     Data:    Lab Results   Component Value Date    WBC 7.4 01/06/2025    HGB 9.6 (L) 01/06/2025    HCT 28.8 (L) 01/06/2025    MCV 93.8 01/06/2025     01/06/2025     Lab Results   Component Value Date/Time     02/04/2025 10:00 AM    K 4.3 02/04/2025 10:00 AM    K 5.1 01/08/2023 05:35 AM    CL 93 02/04/2025 10:00 AM    CO2 26 02/04/2025 10:00 AM    BUN 57 02/04/2025 10:00 AM    CREATININE 1.4 02/04/2025 10:00 AM    GLUCOSE 312 02/04/2025 10:00 AM    GLUCOSE 133 04/18/2012 08:43 AM    CALCIUM 9.6 02/04/2025 10:00 AM    LABGLOM 56 02/04/2025 10:00 AM    LABGLOM 60 04/19/2024 10:45 AM      Lab Results   Component Value Date/Time    MG 1.6 09/26/2024 09:35 AM     Lab Results   Component Value Date    TSH 5.62 (H) 01/06/2025           EKG: NSR rate 72 bpm   QTc 503ms  Please see scan in Cardiology.    Stress test 02/11/2025:     Stress Combined Conclusion: Findings suggest a low risk of cardiac events.    Stress Function: Left ventricular function post-stress is normal. Post-stress ejection fraction is 54%.    Perfusion Comments: LV perfusion is equivocal.    Perfusion Defect: There is a severe left ventricular stress perfusion defect that is medium in size present in the inferior segment(s) that is fixed. The defect appears to be probable artifact caused by subdiaphragmatic

## 2025-03-18 ENCOUNTER — OFFICE VISIT (OUTPATIENT)
Dept: NON INVASIVE DIAGNOSTICS | Age: 70
End: 2025-03-18
Payer: MEDICARE

## 2025-03-18 VITALS
OXYGEN SATURATION: 95 % | RESPIRATION RATE: 16 BRPM | HEIGHT: 65 IN | BODY MASS INDEX: 34.32 KG/M2 | SYSTOLIC BLOOD PRESSURE: 142 MMHG | TEMPERATURE: 97.7 F | HEART RATE: 72 BPM | WEIGHT: 206 LBS | DIASTOLIC BLOOD PRESSURE: 58 MMHG

## 2025-03-18 DIAGNOSIS — I48.19 PERSISTENT ATRIAL FIBRILLATION (HCC): Primary | ICD-10-CM

## 2025-03-18 DIAGNOSIS — Z95.0 PACEMAKER: ICD-10-CM

## 2025-03-18 PROCEDURE — 3077F SYST BP >= 140 MM HG: CPT | Performed by: NURSE PRACTITIONER

## 2025-03-18 PROCEDURE — 1036F TOBACCO NON-USER: CPT | Performed by: NURSE PRACTITIONER

## 2025-03-18 PROCEDURE — G8427 DOCREV CUR MEDS BY ELIG CLIN: HCPCS | Performed by: NURSE PRACTITIONER

## 2025-03-18 PROCEDURE — 3017F COLORECTAL CA SCREEN DOC REV: CPT | Performed by: NURSE PRACTITIONER

## 2025-03-18 PROCEDURE — G8417 CALC BMI ABV UP PARAM F/U: HCPCS | Performed by: NURSE PRACTITIONER

## 2025-03-18 PROCEDURE — 99215 OFFICE O/P EST HI 40 MIN: CPT | Performed by: NURSE PRACTITIONER

## 2025-03-18 PROCEDURE — 93280 PM DEVICE PROGR EVAL DUAL: CPT | Performed by: NURSE PRACTITIONER

## 2025-03-18 PROCEDURE — 93000 ELECTROCARDIOGRAM COMPLETE: CPT | Performed by: INTERNAL MEDICINE

## 2025-03-18 PROCEDURE — 1159F MED LIST DOCD IN RCRD: CPT | Performed by: NURSE PRACTITIONER

## 2025-03-18 PROCEDURE — 3078F DIAST BP <80 MM HG: CPT | Performed by: NURSE PRACTITIONER

## 2025-03-18 PROCEDURE — 1123F ACP DISCUSS/DSCN MKR DOCD: CPT | Performed by: NURSE PRACTITIONER

## 2025-03-20 ENCOUNTER — TELEPHONE (OUTPATIENT)
Dept: NON INVASIVE DIAGNOSTICS | Age: 70
End: 2025-03-20

## 2025-03-20 ENCOUNTER — PREP FOR PROCEDURE (OUTPATIENT)
Dept: NON INVASIVE DIAGNOSTICS | Age: 70
End: 2025-03-20

## 2025-03-20 DIAGNOSIS — I48.19 PERSISTENT ATRIAL FIBRILLATION (HCC): Primary | ICD-10-CM

## 2025-03-20 DIAGNOSIS — M79.89 OTHER SPECIFIED SOFT TISSUE DISORDERS: Primary | ICD-10-CM

## 2025-03-20 RX ORDER — SODIUM CHLORIDE 0.9 % (FLUSH) 0.9 %
5-40 SYRINGE (ML) INJECTION PRN
Status: CANCELLED | OUTPATIENT
Start: 2025-03-20

## 2025-03-20 RX ORDER — SODIUM CHLORIDE 0.9 % (FLUSH) 0.9 %
5-40 SYRINGE (ML) INJECTION EVERY 12 HOURS SCHEDULED
Status: CANCELLED | OUTPATIENT
Start: 2025-03-20

## 2025-03-20 RX ORDER — SODIUM CHLORIDE 9 MG/ML
INJECTION, SOLUTION INTRAVENOUS PRN
Status: CANCELLED | OUTPATIENT
Start: 2025-03-20

## 2025-03-20 NOTE — TELEPHONE ENCOUNTER
Please check prior auth.    Date:4/23/2025    Doc:[email or web address]    Procedure:  CRT-P upgrade - MDT    CPT: 62269 and 74197    ICD: I49.5    Electronically signed by Lupis Mantilla MA on 3/20/2025 at 1:03 PM

## 2025-03-20 NOTE — TELEPHONE ENCOUNTER
Please check prior auth.    Date:4/17/2025    Doc:[email or web address]    Procedure:  Venogram    CPT: 31894    ICD: M79.89    Electronically signed by Lupis Mantilla MA on 3/20/2025 at 1:01 PM

## 2025-04-01 ENCOUNTER — TELEPHONE (OUTPATIENT)
Dept: OTHER | Age: 70
End: 2025-04-01

## 2025-04-01 ENCOUNTER — HOSPITAL ENCOUNTER (OUTPATIENT)
Dept: OTHER | Age: 70
Setting detail: THERAPIES SERIES
Discharge: HOME | End: 2025-04-01
Payer: MEDICARE

## 2025-04-01 ENCOUNTER — RESULTS FOLLOW-UP (OUTPATIENT)
Age: 70
End: 2025-04-01

## 2025-04-01 VITALS
HEART RATE: 68 BPM | SYSTOLIC BLOOD PRESSURE: 139 MMHG | WEIGHT: 203 LBS | DIASTOLIC BLOOD PRESSURE: 64 MMHG | OXYGEN SATURATION: 95 % | BODY MASS INDEX: 33.78 KG/M2 | RESPIRATION RATE: 16 BRPM

## 2025-04-01 DIAGNOSIS — I50.22 CHRONIC SYSTOLIC (CONGESTIVE) HEART FAILURE (HCC): Primary | ICD-10-CM

## 2025-04-01 LAB
ANION GAP SERPL CALCULATED.3IONS-SCNC: 18 MMOL/L (ref 7–16)
BNP SERPL-MCNC: 951 PG/ML (ref 0–125)
BUN SERPL-MCNC: 61 MG/DL (ref 6–23)
CALCIUM SERPL-MCNC: 10 MG/DL (ref 8.6–10.2)
CHLORIDE SERPL-SCNC: 97 MMOL/L (ref 98–107)
CO2 SERPL-SCNC: 23 MMOL/L (ref 22–29)
CREAT SERPL-MCNC: 1.5 MG/DL (ref 0.7–1.2)
GFR, ESTIMATED: 49 ML/MIN/1.73M2
GLUCOSE SERPL-MCNC: 231 MG/DL (ref 74–99)
POTASSIUM SERPL-SCNC: 4.2 MMOL/L (ref 3.5–5)
SODIUM SERPL-SCNC: 138 MMOL/L (ref 132–146)

## 2025-04-01 PROCEDURE — 83880 ASSAY OF NATRIURETIC PEPTIDE: CPT

## 2025-04-01 PROCEDURE — 99214 OFFICE O/P EST MOD 30 MIN: CPT

## 2025-04-01 PROCEDURE — 80048 BASIC METABOLIC PNL TOTAL CA: CPT

## 2025-04-01 RX ORDER — BUMETANIDE 2 MG/1
2 TABLET ORAL
Qty: 90 TABLET | Refills: 1 | Status: ON HOLD
Start: 2025-04-02 | End: 2025-05-09 | Stop reason: HOSPADM

## 2025-04-01 RX ORDER — LISINOPRIL 5 MG/1
5 TABLET ORAL DAILY
COMMUNITY

## 2025-04-01 NOTE — TELEPHONE ENCOUNTER
3:01 PM Spoke with pts wife Petra to give new orders Per MANPREET Mac    Labs and CHF clinic note reviewed.  /64, HR 68  Wt 203lbs--from 207lb on 2/4/25     Hx HFpEF now with HFmrEF, LVEF 45% per echo 2/7/24 (prior EF 65% in 8/2022)     Current GDMT:  Bumex 2mg daily (ordered twice daily but patient only taking once daily)  Toprol XL 25mg daily  Jardiance - stopped by nephrology            Please clarify if patient taking lisinopril 5mg PO daily (On [facility] provider visit from 3/7/25) but not listed on med list here??     If patient is NOT taking lisinopril will anticipate starting Entresto 24/26 mg BID     Decrease Bumex 2 mg three times weekly   Stop potassium supplement   Follow up BMP in 1 week      Return to CHF clinic in at least 1 month for ongoing titration      ? F/U with general cardiology--referral made on 3/3/25 by Dr. Pereira but no appt scheduled (please send message to general cardiology)  F/U with EP CNP on 8/4/25 as scheduled    Pt is taking lisinopril 5mg daily.  Wife verbalized above instructions.  Message sent to Dr. Fontanez's coordinator to schedule pt per referral by Dr. Pereira.    Electronically signed by Amber Reed RN on 4/1/2025 at 3:02 PM

## 2025-04-01 NOTE — RESULT ENCOUNTER NOTE
Labs and CHF clinic note reviewed.  /64, HR 68  Wt 203lbs--from 207lb on 2/4/25    Hx HFpEF now with HFmrEF, LVEF 45% per echo 2/7/24 (prior EF 65% in 8/2022)    Current GDMT:  Bumex 2mg daily (ordered twice daily but patient only taking once daily)  Toprol XL 25mg daily  Jardiance - stopped by nephrology         Please clarify if patient taking lisinopril 5mg PO daily (On [facility] provider visit from 3/7/25) but not listed on med list here??    If patient is NOT taking lisinopril will anticipate starting Entresto 24/26 mg BID    Decrease Bumex 2 mg three times weekly   Stop potassium supplement   Follow up BMP in 1 week     Return to CHF clinic in at least 1 month for ongoing titration     ? F/U with general cardiology--referral made on 3/3/25 by Dr. Pereira but no appt scheduled (please send message to general cardiology)  F/U with EP CNP on 8/4/25 as scheduled

## 2025-04-01 NOTE — PROGRESS NOTES
Congestive Heart Failure Clinic   [facility]       Referring Provider: -    Primary Care Physician: Oly Crespo MD   Cardiologist: -DR. MERCER  Nephrologist: Dr. Sánchez      HISTORY OF PRESENT ILLNESS:     Wander Rocha is a 69 y.o. (1955) male with a history of HFpEF, most recent EF:  Lab Results   Component Value Date    LVEF 65 08/23/2022 7/12/24  no new echo   He presents to the CHF clinic for ongoing evaluation and monitoring of heart failure.    In the CHF clinic today he denies any adverse symptoms except:  [] Dizziness or lightheadedness   [] Syncope or near syncope  [] Recent Fall  [] Shortness of breath at rest   [x] Dyspnea with exertion (occasionally with climbing stairs)  [] Decline in functional capacity (unable to perform activities they had previously been able to do)  [] Fatigue   [] Orthopnea  [] PND  [] Nocturnal cough  [] Hemoptysis  [] Chest pain, pressure, or discomfort  [] Palpitations  [] Abdominal distention  [] Abdominal bloating  [] Early satiety  [] Blood in stool   [] Diarrhea  [] Constipation  [] Nausea/Vomiting  [] Decreased urinary response to oral diuretic   [] Scrotal swelling   []  SLIGHT Lower extremity edema  [] Used PRN doses of oral diuretic   [] Weight gain       Wt Readings from Last 3 Encounters:   04/01/25 92.1 kg (203 lb)   03/18/25 93.4 kg (206 lb)   03/14/25 95.9 kg (211 lb 6.4 oz)       SOCIAL HISTORY:  [x] Denies tobacco, alcohol or illicit drug abuse  [] Tobacco use:  [] ETOH use:  [] Illicit drug use:        MEDICATIONS:    No Known Allergies    Current Outpatient Medications:     atorvastatin (LIPITOR) 40 MG tablet, TAKE ONE TABLET BY MOUTH DAILY, Disp: 90 tablet, Rfl: 1    apixaban (ELIQUIS) 5 MG TABS tablet, Take 1 tablet by mouth 2 times daily, Disp: 180 tablet, Rfl: 2    metoprolol succinate (TOPROL XL) 25 MG extended release tablet, Take 1 tablet by mouth daily, Disp: 90 tablet, Rfl: 1    levothyroxine

## 2025-04-02 ENCOUNTER — TELEPHONE (OUTPATIENT)
Dept: CARDIOLOGY CLINIC | Age: 70
End: 2025-04-02

## 2025-04-02 NOTE — TELEPHONE ENCOUNTER
Left message for patient to call the office to schedule appt per RH     Ethel benitez, APRN - CNP  Henrik Fontanez MD; Inge Rosenberg  Thank you for the update!    I always try to make sure they are having their routine follow up with their cardiologist as it seems some of them can fall off a list somewhere and they get missed for yearsss.    However even I sometimes lose track of how long its been since their last cardiologist appointment as I get into the grind of focusing on etiology/GDMT.    Mr. Rocha did get back to us and he has been taking Lisinopril even though it wasn't on his med rec, we updated the med rec today.    Hope all is well!    Ethel        Previous Messages       ----- Message -----  From: Henrik Fontanez MD  Sent: 4/1/2025   4:38 PM EDT  To: Inge Rosenberg; Ethel Vickers, APRN - CNP  Subject: RE: Follow up with Dr. Huseyin Alcazar I dont think this is my patient, but you can give a regular ov slot  Ethel: looks like you have been managing patient for 3 yrs! :-).     But now let me know if you make any med changes, even tho' they are always correct.   Lately I have been picking up chf clinic patients who have had extensive w/u's and all med selection and titration, and now just need a CArdiologist figurehead  thx  ----- Message -----  From: Inge Rosenberg  Sent: 4/1/2025   3:19 PM EDT  To: Henrik Fontanez MD  Subject: FW: Follow up with Dr. Fontanez                      Please advise  ----- Message -----  From: Amber Reed RN  Sent: 4/1/2025   3:04 PM EDT  To: Inge Rosenberg  Subject: Follow up with Dr. Huseyin Alcazar,    This pt needs scheduled with Dr. Fontanez for a follow up per a referral from Dr. Pereira. I let the wife know someone would be reaching out to get pt scheduled.    Thank you!

## 2025-04-11 ENCOUNTER — TELEPHONE (OUTPATIENT)
Dept: NON INVASIVE DIAGNOSTICS | Age: 70
End: 2025-04-11

## 2025-04-16 ENCOUNTER — OFFICE VISIT (OUTPATIENT)
Dept: CARDIOLOGY CLINIC | Age: 70
End: 2025-04-16
Payer: MEDICARE

## 2025-04-16 VITALS
WEIGHT: 210 LBS | HEIGHT: 67 IN | RESPIRATION RATE: 18 BRPM | SYSTOLIC BLOOD PRESSURE: 112 MMHG | DIASTOLIC BLOOD PRESSURE: 55 MMHG | BODY MASS INDEX: 32.96 KG/M2 | HEART RATE: 73 BPM

## 2025-04-16 DIAGNOSIS — Z95.0 PACEMAKER: ICD-10-CM

## 2025-04-16 DIAGNOSIS — I48.19 PERSISTENT ATRIAL FIBRILLATION (HCC): Chronic | ICD-10-CM

## 2025-04-16 DIAGNOSIS — I50.22 CHRONIC SYSTOLIC (CONGESTIVE) HEART FAILURE: Primary | ICD-10-CM

## 2025-04-16 PROBLEM — D49.2 NEOPLASM OF SOFT TISSUE: Status: RESOLVED | Noted: 2023-01-23 | Resolved: 2025-04-16

## 2025-04-16 PROBLEM — F06.30 MOOD DISORDER DUE TO MEDICAL CONDITION: Status: RESOLVED | Noted: 2023-12-05 | Resolved: 2025-04-16

## 2025-04-16 PROCEDURE — 3074F SYST BP LT 130 MM HG: CPT | Performed by: INTERNAL MEDICINE

## 2025-04-16 PROCEDURE — 1036F TOBACCO NON-USER: CPT | Performed by: INTERNAL MEDICINE

## 2025-04-16 PROCEDURE — 1160F RVW MEDS BY RX/DR IN RCRD: CPT | Performed by: INTERNAL MEDICINE

## 2025-04-16 PROCEDURE — G2211 COMPLEX E/M VISIT ADD ON: HCPCS | Performed by: INTERNAL MEDICINE

## 2025-04-16 PROCEDURE — 75822 VEIN X-RAY ARMS/LEGS: CPT | Performed by: INTERNAL MEDICINE

## 2025-04-16 PROCEDURE — G8427 DOCREV CUR MEDS BY ELIG CLIN: HCPCS | Performed by: INTERNAL MEDICINE

## 2025-04-16 PROCEDURE — 93000 ELECTROCARDIOGRAM COMPLETE: CPT | Performed by: INTERNAL MEDICINE

## 2025-04-16 PROCEDURE — G8417 CALC BMI ABV UP PARAM F/U: HCPCS | Performed by: INTERNAL MEDICINE

## 2025-04-16 PROCEDURE — 3017F COLORECTAL CA SCREEN DOC REV: CPT | Performed by: INTERNAL MEDICINE

## 2025-04-16 PROCEDURE — 3078F DIAST BP <80 MM HG: CPT | Performed by: INTERNAL MEDICINE

## 2025-04-16 PROCEDURE — 99214 OFFICE O/P EST MOD 30 MIN: CPT | Performed by: INTERNAL MEDICINE

## 2025-04-16 PROCEDURE — 1159F MED LIST DOCD IN RCRD: CPT | Performed by: INTERNAL MEDICINE

## 2025-04-16 PROCEDURE — 1123F ACP DISCUSS/DSCN MKR DOCD: CPT | Performed by: INTERNAL MEDICINE

## 2025-04-16 NOTE — PROGRESS NOTES
paroxysmal nocturnal dyspnea.          Physical Exam   BP (!) 112/55   Pulse 73   Resp 18   Ht 1.702 m (5' 7\")   Wt 95.3 kg (210 lb)   BMI 32.89 kg/m²   Constitutional: Oriented to person, place, and time.obese . No distress.    Neck: No JVD present. Carotid bruit is not present.   Cardiovascular: Normal rate, regular rhythm, normal heart sounds and intact distal pulses.  No gallop and no friction rub. Grade II/VI short GWENDOLYN best heard LLSB.  Pulmonary/Chest: Effort normal and breath sounds normal.   Abdominal: Soft. Bowel sounds are normal. No distension and no mass.   Musculoskeletal: No edema   Neurological: Alert and oriented to person, place, and time.   Skin: Skin is warm and dry.   Psychiatric: Normal mood and affect. Behavior is normal.     EKG:  unknown atrial rhythm, ventricular pacing .    ASSESSMENT AND PLAN:  Patient Active Problem List   Diagnosis    Class 2 obesity due to excess calories with serious comorbidity and body mass index (BMI) of 38.0 to 38.9 in adult    Hyperlipidemia    Essential hypertension    Hypothyroidism    NIKO (obstructive sleep apnea)    Restrictive lung disease secondary to obesity    Pacemaker    Persistent atrial fibrillation (HCC)    Chronic obstructive pulmonary disease, unspecified COPD type (HCC)    Normocytic anemia    Stage 3b chronic kidney disease (HCC)    Proteinuria due to type 2 diabetes mellitus (HCC)    Gout    Chronic systolic (congestive) heart failure    Type 2 diabetes mellitus with diabetic neuropathy, with long-term current use of insulin (HCC)    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

## 2025-04-17 ENCOUNTER — HOSPITAL ENCOUNTER (OUTPATIENT)
Age: 70
Setting detail: OUTPATIENT SURGERY
Discharge: HOME | End: 2025-04-17
Attending: INTERNAL MEDICINE | Admitting: INTERNAL MEDICINE
Payer: MEDICARE

## 2025-04-17 VITALS
HEIGHT: 67 IN | SYSTOLIC BLOOD PRESSURE: 136 MMHG | WEIGHT: 210 LBS | DIASTOLIC BLOOD PRESSURE: 75 MMHG | OXYGEN SATURATION: 98 % | BODY MASS INDEX: 32.96 KG/M2 | TEMPERATURE: 97.9 F | HEART RATE: 82 BPM

## 2025-04-17 DIAGNOSIS — M79.89 SWELLING OF LIMB: ICD-10-CM

## 2025-04-17 LAB
ANION GAP SERPL CALCULATED.3IONS-SCNC: 12 MMOL/L (ref 7–16)
BASOPHILS # BLD: 0.05 K/UL (ref 0–0.2)
BASOPHILS NFR BLD: 1 % (ref 0–2)
BUN SERPL-MCNC: 55 MG/DL (ref 8–23)
CALCIUM SERPL-MCNC: 9.6 MG/DL (ref 8.8–10.2)
CHLORIDE SERPL-SCNC: 98 MMOL/L (ref 98–107)
CO2 SERPL-SCNC: 25 MMOL/L (ref 22–29)
CREAT SERPL-MCNC: 1.5 MG/DL (ref 0.7–1.2)
ECHO BSA: 2.12 M2
EOSINOPHIL # BLD: 0.41 K/UL (ref 0.05–0.5)
EOSINOPHILS RELATIVE PERCENT: 7 % (ref 0–6)
ERYTHROCYTE [DISTWIDTH] IN BLOOD BY AUTOMATED COUNT: 16.6 % (ref 11.5–15)
GFR, ESTIMATED: 48 ML/MIN/1.73M2
GLUCOSE SERPL-MCNC: 163 MG/DL (ref 74–99)
HCT VFR BLD AUTO: 31.6 % (ref 37–54)
HGB BLD-MCNC: 10 G/DL (ref 12.5–16.5)
IMM GRANULOCYTES # BLD AUTO: 0.23 K/UL (ref 0–0.58)
IMM GRANULOCYTES NFR BLD: 4 % (ref 0–5)
LYMPHOCYTES NFR BLD: 0.91 K/UL (ref 1.5–4)
LYMPHOCYTES RELATIVE PERCENT: 14 % (ref 20–42)
MCH RBC QN AUTO: 30.5 PG (ref 26–35)
MCHC RBC AUTO-ENTMCNC: 31.6 G/DL (ref 32–34.5)
MCV RBC AUTO: 96.3 FL (ref 80–99.9)
MONOCYTES NFR BLD: 0.59 K/UL (ref 0.1–0.95)
MONOCYTES NFR BLD: 9 % (ref 2–12)
NEUTROPHILS NFR BLD: 65 % (ref 43–80)
NEUTS SEG NFR BLD: 4.15 K/UL (ref 1.8–7.3)
PLATELET # BLD AUTO: 204 K/UL (ref 130–450)
PMV BLD AUTO: 9.6 FL (ref 7–12)
POTASSIUM SERPL-SCNC: 4.6 MMOL/L (ref 3.5–5.1)
RBC # BLD AUTO: 3.28 M/UL (ref 3.8–5.8)
SODIUM SERPL-SCNC: 135 MMOL/L (ref 136–145)
WBC OTHER # BLD: 6.3 K/UL (ref 4.5–11.5)

## 2025-04-17 PROCEDURE — 36005 INJECTION EXT VENOGRAPHY: CPT | Performed by: INTERNAL MEDICINE

## 2025-04-17 PROCEDURE — 80048 BASIC METABOLIC PNL TOTAL CA: CPT

## 2025-04-17 PROCEDURE — 7100000010 HC PHASE II RECOVERY - FIRST 15 MIN: Performed by: INTERNAL MEDICINE

## 2025-04-17 PROCEDURE — 7100000011 HC PHASE II RECOVERY - ADDTL 15 MIN: Performed by: INTERNAL MEDICINE

## 2025-04-17 PROCEDURE — 6360000004 HC RX CONTRAST MEDICATION: Performed by: INTERNAL MEDICINE

## 2025-04-17 PROCEDURE — 85025 COMPLETE CBC W/AUTO DIFF WBC: CPT

## 2025-04-17 PROCEDURE — 75822 VEIN X-RAY ARMS/LEGS: CPT | Performed by: INTERNAL MEDICINE

## 2025-04-17 RX ORDER — IOPAMIDOL 755 MG/ML
INJECTION, SOLUTION INTRAVASCULAR PRN
Status: DISCONTINUED | OUTPATIENT
Start: 2025-04-17 | End: 2025-04-17 | Stop reason: HOSPADM

## 2025-04-17 NOTE — H&P
[facility]  Outpatient Progress Note  Wander Rocha  1955  Date of Service: 4/17/2025  PCP: Oly Crespo MD  Electrophysiologist: Dr. Pereira         Subjective: Wander Rocha is seen for follow-up and management of: pacemaker in situ and atrial fibrillation    Wander Rocha is a 69 y.o. male who has a history of HTN, HLD, insulin requiring T2DM with peripheral neuropathy, CKD, chronic anemia, pulmonary nodules, NIKO compliant with treatment, restrictive lung disease with qhs supplemental O2 use (PRN during the day), former tobacco smoker, hypothyroidism on HRT, questionable chronic HFpEF, NSVT, SND s/p dual-chamber Medtronic PPM placement 10/2017, persistent AF on chronic Eliquis therapy.  He was seen in office 08/15/2022 with Dr. Pereira and was RV pacing 59.2% at that  time. He then followed up with Thelma PHILLIPS on 01/07/2025 and was RV pacing 94% A TTE was ordered and showed an EF of 45%. A follow up stress test showed low risk 02/24/2025. Today (03/18/2025) he presents for a discussion regarding CRT-P upgrade and are agreeable to proceed.  He does note some activity intolerance. Will attempt to obtain venogram prior to CRT-P upgrade for better planning.  Underlying rhythm is 46 bpm today. Risks, benefits, and alternatives of permanent pacemaker upgrade were discussed in detail today. These risks include but are not limited to bleeding, infection, blood clot, pneumothorax, hemothorax,cardiac valve damage, cardiac perforation and tamponade required emergent thoracotomy, contrast induced nephropathy leading to short or even long term dialysis, vascular injury requiring emergent surgical repair, lead dislodgement, stroke and even death. The patient understands these risks and agrees to proceed with pacemaker upgrade.     Patient Active Problem List    Diagnosis Date Noted    Gout 02/01/2023     Priority: Medium     evaluated per above and in the scanned document, along with iterative adjustments (capture thresholds) to assess and select the most appropriate final programming to provide for consistent delivery of the appropriate therapy and to verify function of the device.         I have independently reviewed all of the ECGs and rhythm strips per above  Assessment:      1. Pacemaker in situ  - DOI 10/3/2017.  - Dual chamber; Medtronic; MRI conditional.  - Indication: Sinus node dysfunction  now with frequent RV pacing.   - Normal device function, pacemaker dependent today     2. Persistent atrial fibrillation  - [id number]= 3; On Eliquis  - Toprol XL 25mg QD  - AF burden 0 %.   - Presents in NSR   - rate control treatment only.   - Re-education on importance of well controlled HTN (goal BP < 130/80), adequate weight control (goal BMI of < 27), adequate glucose control (goal hemoglobin A1c < 6.5), physical activity consisting of moderate cardiopulmonary exercise up to a goal of 250 min/wk, consider sleep study regarding a formal evaluation for sleep apnea, smoking cessation and limited ETOH intake.      3. Nonsustained ventricular tachycardia  - Asymptomatic.  - Echo in May 2017 showed LV EF of 70%.  - Echo 10/20/20 LVEF 60-65%   - Echo 8/23/22 LVEF 65%  - On Toprol 25mg QD      4. Sinus node dysfunction  - S/p pacemaker implantation    5. HFmrEF   - Suspect secondary to frequent RV pacing given low risk stress 02/24/2025.   - TTE 02/07/2025 LVEF 45%   - TTE 08/23/2022 LVEF 65%   - GDMT: Toprol.       5. Hypertension  - Controlled on today's visit   - On Toprol XL, Zaroxolyn and Bumex     6. Diabetes mellitus  - On Metformin, Jardiance and Insulin.  Hemoglobin A1C   Date Value Ref Range Status   01/06/2025 8.9 (H) 4.0 - 5.6 % Final         7. Restrictive lung disease  - On Dulera.  - Management per pulmonary     8. Obesity   Body mass index is 32.89 kg/m².  - Recommend weight loss     9. Hyperlipidemia  - On Lipitor.     10.

## 2025-04-22 PROCEDURE — 99223 1ST HOSP IP/OBS HIGH 75: CPT | Performed by: INTERNAL MEDICINE

## 2025-04-22 NOTE — H&P
[facility]  History and Physical Examination  Wander Rocha  1955  Date of Service: 4/23/2025  PCP: Oly Crespo MD  Electrophysiologist: Elsie Pereira MD        \Subjective: Wander Rocha is seen for follow-up and management of pacemaker in situ and persistent atrial fibrillation. Wander Rocha is a 69 y.o. male who has a history of HTN, HLD, insulin requiring T2DM with peripheral neuropathy, CKD, chronic anemia, pulmonary nodules, NIKO compliant with treatment, restrictive lung disease with qhs supplemental O2 use (PRN during the day), former tobacco smoker, hypothyroidism on HRT, questionable chronic HFpEF, NSVT, SND s/p dual-chamber Medtronic PPM placement 10/2017, persistent AF on chronic Eliquis therapy.  He was seen in office 08/15/2022  and was RV pacing 59.2% at that  time. He then followed up on 01/07/2025 and was RV pacing 94% A TTE was ordered and showed an EF of 45%. A follow up stress test showed low risk 02/24/2025. He is agreeable for CRT-P upgrade. 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 Risks, benefits, and alternatives of CRT-P upgrade were discussed in detail today. These risks include but are not limited to bleeding, infection, blood clot, pneumothorax, hemothorax,cardiac valve damage, cardiac perforation and tamponade required emergent thoracotomy, contrast induced nephropathy leading to short or even long term dialysis, vascular injury requiring emergent surgical repair, lead dislodgement, stroke and even death. The patient understands these risks and agrees to proceed with CRT-P upgrade.     Patient Active Problem List    Diagnosis Date Noted    S/P cardiac pacemaker procedure 04/23/2025     Priority: High    Gout 02/01/2023     Priority: Medium    Proteinuria due to type 2

## 2025-04-23 ENCOUNTER — ANESTHESIA (OUTPATIENT)
Age: 70
End: 2025-04-23
Payer: MEDICARE

## 2025-04-23 ENCOUNTER — ANESTHESIA EVENT (OUTPATIENT)
Age: 70
End: 2025-04-23
Payer: MEDICARE

## 2025-04-23 ENCOUNTER — HOSPITAL ENCOUNTER (INPATIENT)
Age: 70
LOS: 14 days | Discharge: NURSING, REHAB OR LONG-TERM CARE | DRG: 242 | End: 2025-05-09
Attending: INTERNAL MEDICINE | Admitting: INTERNAL MEDICINE
Payer: MEDICARE

## 2025-04-23 ENCOUNTER — APPOINTMENT (OUTPATIENT)
Dept: GENERAL RADIOLOGY | Age: 70
DRG: 242 | End: 2025-04-23
Attending: INTERNAL MEDICINE
Payer: MEDICARE

## 2025-04-23 DIAGNOSIS — I49.5 TACHYCARDIA-BRADYCARDIA (HCC): ICD-10-CM

## 2025-04-23 DIAGNOSIS — I50.22 CHRONIC SYSTOLIC (CONGESTIVE) HEART FAILURE (HCC): ICD-10-CM

## 2025-04-23 DIAGNOSIS — I50.22 HEART FAILURE WITH MID-RANGE EJECTION FRACTION (HFMEF) (HCC): ICD-10-CM

## 2025-04-23 DIAGNOSIS — I42.8 NONISCHEMIC CARDIOMYOPATHY (HCC): Primary | ICD-10-CM

## 2025-04-23 DIAGNOSIS — E78.5 HYPERLIPIDEMIA, UNSPECIFIED HYPERLIPIDEMIA TYPE: ICD-10-CM

## 2025-04-23 PROBLEM — Z95.0 S/P CARDIAC PACEMAKER PROCEDURE: Status: ACTIVE | Noted: 2025-04-23

## 2025-04-23 LAB
ANION GAP SERPL CALCULATED.3IONS-SCNC: 12 MMOL/L (ref 7–16)
BUN SERPL-MCNC: 50 MG/DL (ref 8–23)
CALCIUM SERPL-MCNC: 9.2 MG/DL (ref 8.8–10.2)
CHLORIDE SERPL-SCNC: 99 MMOL/L (ref 98–107)
CO2 SERPL-SCNC: 24 MMOL/L (ref 22–29)
CREAT SERPL-MCNC: 1.4 MG/DL (ref 0.7–1.2)
ERYTHROCYTE [DISTWIDTH] IN BLOOD BY AUTOMATED COUNT: 17.2 % (ref 11.5–15)
GFR, ESTIMATED: 54 ML/MIN/1.73M2
GLUCOSE BLD-MCNC: 173 MG/DL (ref 74–99)
GLUCOSE BLD-MCNC: 218 MG/DL (ref 74–99)
GLUCOSE SERPL-MCNC: 164 MG/DL (ref 74–99)
HCT VFR BLD AUTO: 31.2 % (ref 37–54)
HGB BLD-MCNC: 9.9 G/DL (ref 12.5–16.5)
MCH RBC QN AUTO: 30.8 PG (ref 26–35)
MCHC RBC AUTO-ENTMCNC: 31.7 G/DL (ref 32–34.5)
MCV RBC AUTO: 97.2 FL (ref 80–99.9)
PLATELET # BLD AUTO: 232 K/UL (ref 130–450)
PMV BLD AUTO: 9.5 FL (ref 7–12)
POTASSIUM SERPL-SCNC: 4.5 MMOL/L (ref 3.5–5.1)
RBC # BLD AUTO: 3.21 M/UL (ref 3.8–5.8)
SODIUM SERPL-SCNC: 135 MMOL/L (ref 136–145)
WBC OTHER # BLD: 9.3 K/UL (ref 4.5–11.5)

## 2025-04-23 PROCEDURE — 93005 ELECTROCARDIOGRAM TRACING: CPT | Performed by: INTERNAL MEDICINE

## 2025-04-23 PROCEDURE — 3E0102A INTRODUCTION OF ANTI-INFECTIVE ENVELOPE INTO SUBCUTANEOUS TISSUE, OPEN APPROACH: ICD-10-PCS | Performed by: INTERNAL MEDICINE

## 2025-04-23 PROCEDURE — G0378 HOSPITAL OBSERVATION PER HR: HCPCS

## 2025-04-23 PROCEDURE — 33225 L VENTRIC PACING LEAD ADD-ON: CPT | Performed by: INTERNAL MEDICINE

## 2025-04-23 PROCEDURE — 6360000004 HC RX CONTRAST MEDICATION: Performed by: INTERNAL MEDICINE

## 2025-04-23 PROCEDURE — 80048 BASIC METABOLIC PNL TOTAL CA: CPT

## 2025-04-23 PROCEDURE — 6360000002 HC RX W HCPCS: Performed by: INTERNAL MEDICINE

## 2025-04-23 PROCEDURE — C2621 PMKR, OTHER THAN SING/DUAL: HCPCS | Performed by: INTERNAL MEDICINE

## 2025-04-23 PROCEDURE — 6370000000 HC RX 637 (ALT 250 FOR IP): Performed by: NURSE PRACTITIONER

## 2025-04-23 PROCEDURE — 7100000010 HC PHASE II RECOVERY - FIRST 15 MIN: Performed by: INTERNAL MEDICINE

## 2025-04-23 PROCEDURE — 2720000010 HC SURG SUPPLY STERILE: Performed by: INTERNAL MEDICINE

## 2025-04-23 PROCEDURE — 3700000001 HC ADD 15 MINUTES (ANESTHESIA): Performed by: INTERNAL MEDICINE

## 2025-04-23 PROCEDURE — 82962 GLUCOSE BLOOD TEST: CPT

## 2025-04-23 PROCEDURE — C1894 INTRO/SHEATH, NON-LASER: HCPCS | Performed by: INTERNAL MEDICINE

## 2025-04-23 PROCEDURE — 33229 REMV&REPLC PM GEN MULT LEADS: CPT | Performed by: INTERNAL MEDICINE

## 2025-04-23 PROCEDURE — 85027 COMPLETE CBC AUTOMATED: CPT

## 2025-04-23 PROCEDURE — 02HL3JZ INSERTION OF PACEMAKER LEAD INTO LEFT VENTRICLE, PERCUTANEOUS APPROACH: ICD-10-PCS | Performed by: INTERNAL MEDICINE

## 2025-04-23 PROCEDURE — C1769 GUIDE WIRE: HCPCS | Performed by: INTERNAL MEDICINE

## 2025-04-23 PROCEDURE — 2580000003 HC RX 258: Performed by: INTERNAL MEDICINE

## 2025-04-23 PROCEDURE — 6360000002 HC RX W HCPCS: Performed by: NURSE ANESTHETIST, CERTIFIED REGISTERED

## 2025-04-23 PROCEDURE — 7100000011 HC PHASE II RECOVERY - ADDTL 15 MIN: Performed by: INTERNAL MEDICINE

## 2025-04-23 PROCEDURE — 0JH607Z INSERTION OF CARDIAC RESYNCHRONIZATION PACEMAKER PULSE GENERATOR INTO CHEST SUBCUTANEOUS TISSUE AND FASCIA, OPEN APPROACH: ICD-10-PCS | Performed by: INTERNAL MEDICINE

## 2025-04-23 PROCEDURE — 2709999900 HC NON-CHARGEABLE SUPPLY: Performed by: INTERNAL MEDICINE

## 2025-04-23 PROCEDURE — 0JPT0PZ REMOVAL OF CARDIAC RHYTHM RELATED DEVICE FROM TRUNK SUBCUTANEOUS TISSUE AND FASCIA, OPEN APPROACH: ICD-10-PCS | Performed by: INTERNAL MEDICINE

## 2025-04-23 PROCEDURE — B5171ZZ FLUOROSCOPY OF LEFT SUBCLAVIAN VEIN USING LOW OSMOLAR CONTRAST: ICD-10-PCS | Performed by: INTERNAL MEDICINE

## 2025-04-23 PROCEDURE — C1730 CATH, EP, 19 OR FEW ELECT: HCPCS | Performed by: INTERNAL MEDICINE

## 2025-04-23 PROCEDURE — 02H43JZ INSERTION OF PACEMAKER LEAD INTO CORONARY VEIN, PERCUTANEOUS APPROACH: ICD-10-PCS | Performed by: INTERNAL MEDICINE

## 2025-04-23 PROCEDURE — C1900 LEAD, CORONARY VENOUS: HCPCS | Performed by: INTERNAL MEDICINE

## 2025-04-23 PROCEDURE — C1889 IMPLANT/INSERT DEVICE, NOC: HCPCS | Performed by: INTERNAL MEDICINE

## 2025-04-23 PROCEDURE — 6370000000 HC RX 637 (ALT 250 FOR IP): Performed by: INTERNAL MEDICINE

## 2025-04-23 PROCEDURE — 2500000003 HC RX 250 WO HCPCS: Performed by: INTERNAL MEDICINE

## 2025-04-23 PROCEDURE — 3700000000 HC ANESTHESIA ATTENDED CARE: Performed by: INTERNAL MEDICINE

## 2025-04-23 PROCEDURE — C1892 INTRO/SHEATH,FIXED,PEEL-AWAY: HCPCS | Performed by: INTERNAL MEDICINE

## 2025-04-23 PROCEDURE — 2580000003 HC RX 258: Performed by: NURSE ANESTHETIST, CERTIFIED REGISTERED

## 2025-04-23 PROCEDURE — 71045 X-RAY EXAM CHEST 1 VIEW: CPT

## 2025-04-23 DEVICE — ENVELOPE CMRM6122 ABSORB MED MR
Type: IMPLANTABLE DEVICE | Status: FUNCTIONAL
Brand: TYRX™

## 2025-04-23 DEVICE — LEAD 439888 MRI STRAIGHT US
Type: IMPLANTABLE DEVICE | Site: HEART | Status: FUNCTIONAL
Brand: ATTAIN PERFORMA™ STRAIGHT MRI SURESCAN™

## 2025-04-23 DEVICE — PACEMAKER CARD W46.5XH59MM D11MM 30GM TI POLYUR SIL RUB W4TR01] MEDTRONIC CARDIAC RTHYM MGT]: Type: IMPLANTABLE DEVICE | Status: FUNCTIONAL

## 2025-04-23 RX ORDER — FERROUS SULFATE 325(65) MG
325 TABLET ORAL EVERY OTHER DAY
Status: DISCONTINUED | OUTPATIENT
Start: 2025-04-24 | End: 2025-05-09 | Stop reason: HOSPADM

## 2025-04-23 RX ORDER — PROPOFOL 10 MG/ML
INJECTION, EMULSION INTRAVENOUS
Status: DISCONTINUED | OUTPATIENT
Start: 2025-04-23 | End: 2025-04-23 | Stop reason: SDUPTHER

## 2025-04-23 RX ORDER — ACETAMINOPHEN 325 MG/1
650 TABLET ORAL EVERY 4 HOURS PRN
Status: DISCONTINUED | OUTPATIENT
Start: 2025-04-23 | End: 2025-05-09 | Stop reason: HOSPADM

## 2025-04-23 RX ORDER — PHENYLEPHRINE HCL IN 0.9% NACL 1 MG/10 ML
SYRINGE (ML) INTRAVENOUS
Status: DISCONTINUED | OUTPATIENT
Start: 2025-04-23 | End: 2025-04-23 | Stop reason: SDUPTHER

## 2025-04-23 RX ORDER — ASPIRIN 81 MG/1
81 TABLET ORAL DAILY
Status: DISCONTINUED | OUTPATIENT
Start: 2025-04-23 | End: 2025-05-09 | Stop reason: HOSPADM

## 2025-04-23 RX ORDER — SODIUM CHLORIDE 9 MG/ML
INJECTION, SOLUTION INTRAVENOUS
Status: DISCONTINUED | OUTPATIENT
Start: 2025-04-23 | End: 2025-04-23 | Stop reason: SDUPTHER

## 2025-04-23 RX ORDER — SODIUM CHLORIDE 0.9 % (FLUSH) 0.9 %
5-40 SYRINGE (ML) INJECTION EVERY 12 HOURS SCHEDULED
Status: DISCONTINUED | OUTPATIENT
Start: 2025-04-23 | End: 2025-05-09 | Stop reason: HOSPADM

## 2025-04-23 RX ORDER — SODIUM CHLORIDE 0.9 % (FLUSH) 0.9 %
5-40 SYRINGE (ML) INJECTION EVERY 12 HOURS SCHEDULED
Status: DISCONTINUED | OUTPATIENT
Start: 2025-04-23 | End: 2025-04-23 | Stop reason: HOSPADM

## 2025-04-23 RX ORDER — SODIUM CHLORIDE 0.9 % (FLUSH) 0.9 %
5-40 SYRINGE (ML) INJECTION PRN
Status: DISCONTINUED | OUTPATIENT
Start: 2025-04-23 | End: 2025-04-23 | Stop reason: HOSPADM

## 2025-04-23 RX ORDER — SODIUM CHLORIDE 0.9 % (FLUSH) 0.9 %
5-40 SYRINGE (ML) INJECTION PRN
Status: DISCONTINUED | OUTPATIENT
Start: 2025-04-23 | End: 2025-05-09 | Stop reason: HOSPADM

## 2025-04-23 RX ORDER — LEVOTHYROXINE SODIUM 50 UG/1
50 TABLET ORAL DAILY
Status: DISCONTINUED | OUTPATIENT
Start: 2025-04-23 | End: 2025-04-23 | Stop reason: SDUPTHER

## 2025-04-23 RX ORDER — DOXYCYCLINE HYCLATE 100 MG
100 TABLET ORAL 2 TIMES DAILY
Qty: 14 TABLET | Refills: 0 | Status: SHIPPED | OUTPATIENT
Start: 2025-04-23 | End: 2025-04-30

## 2025-04-23 RX ORDER — CEFAZOLIN SODIUM 1 G/3ML
INJECTION, POWDER, FOR SOLUTION INTRAMUSCULAR; INTRAVENOUS
Status: DISCONTINUED | OUTPATIENT
Start: 2025-04-23 | End: 2025-04-23 | Stop reason: SDUPTHER

## 2025-04-23 RX ORDER — VANCOMYCIN HYDROCHLORIDE 1 G/20ML
INJECTION, POWDER, LYOPHILIZED, FOR SOLUTION INTRAVENOUS
Status: DISCONTINUED | OUTPATIENT
Start: 2025-04-23 | End: 2025-04-23 | Stop reason: SDUPTHER

## 2025-04-23 RX ORDER — SODIUM CHLORIDE 9 MG/ML
INJECTION, SOLUTION INTRAVENOUS PRN
Status: DISCONTINUED | OUTPATIENT
Start: 2025-04-23 | End: 2025-04-23 | Stop reason: HOSPADM

## 2025-04-23 RX ORDER — METOPROLOL SUCCINATE 25 MG/1
25 TABLET, EXTENDED RELEASE ORAL DAILY
Status: DISCONTINUED | OUTPATIENT
Start: 2025-04-23 | End: 2025-05-09 | Stop reason: HOSPADM

## 2025-04-23 RX ORDER — ONDANSETRON 2 MG/ML
4 INJECTION INTRAMUSCULAR; INTRAVENOUS EVERY 6 HOURS PRN
Status: DISCONTINUED | OUTPATIENT
Start: 2025-04-23 | End: 2025-05-09 | Stop reason: HOSPADM

## 2025-04-23 RX ORDER — LISINOPRIL 5 MG/1
5 TABLET ORAL DAILY
Status: DISCONTINUED | OUTPATIENT
Start: 2025-04-23 | End: 2025-05-09 | Stop reason: HOSPADM

## 2025-04-23 RX ORDER — ATORVASTATIN CALCIUM 40 MG/1
40 TABLET, FILM COATED ORAL DAILY
Status: DISCONTINUED | OUTPATIENT
Start: 2025-04-23 | End: 2025-05-09 | Stop reason: HOSPADM

## 2025-04-23 RX ORDER — CHOLECALCIFEROL (VITAMIN D3) 50 MCG
2000 TABLET ORAL DAILY
Status: DISCONTINUED | OUTPATIENT
Start: 2025-04-23 | End: 2025-05-09 | Stop reason: HOSPADM

## 2025-04-23 RX ORDER — SODIUM CHLORIDE 9 MG/ML
INJECTION, SOLUTION INTRAVENOUS PRN
Status: DISCONTINUED | OUTPATIENT
Start: 2025-04-23 | End: 2025-05-09 | Stop reason: HOSPADM

## 2025-04-23 RX ORDER — FENTANYL CITRATE 50 UG/ML
INJECTION, SOLUTION INTRAMUSCULAR; INTRAVENOUS
Status: DISCONTINUED | OUTPATIENT
Start: 2025-04-23 | End: 2025-04-23 | Stop reason: SDUPTHER

## 2025-04-23 RX ORDER — LEVOTHYROXINE SODIUM 100 UG/1
100 TABLET ORAL DAILY
Status: DISCONTINUED | OUTPATIENT
Start: 2025-04-23 | End: 2025-04-23 | Stop reason: SDUPTHER

## 2025-04-23 RX ORDER — ALLOPURINOL 100 MG/1
200 TABLET ORAL DAILY
Status: DISCONTINUED | OUTPATIENT
Start: 2025-04-23 | End: 2025-05-09 | Stop reason: HOSPADM

## 2025-04-23 RX ORDER — BUMETANIDE 1 MG/1
2 TABLET ORAL
Status: DISCONTINUED | OUTPATIENT
Start: 2025-04-23 | End: 2025-04-25

## 2025-04-23 RX ORDER — INSULIN GLARGINE 100 [IU]/ML
25 INJECTION, SOLUTION SUBCUTANEOUS 2 TIMES DAILY
Status: DISCONTINUED | OUTPATIENT
Start: 2025-04-23 | End: 2025-04-30

## 2025-04-23 RX ORDER — IOPAMIDOL 755 MG/ML
INJECTION, SOLUTION INTRAVASCULAR PRN
Status: DISCONTINUED | OUTPATIENT
Start: 2025-04-23 | End: 2025-04-23 | Stop reason: HOSPADM

## 2025-04-23 RX ORDER — MIDAZOLAM HYDROCHLORIDE 1 MG/ML
INJECTION, SOLUTION INTRAMUSCULAR; INTRAVENOUS
Status: DISCONTINUED | OUTPATIENT
Start: 2025-04-23 | End: 2025-04-23 | Stop reason: SDUPTHER

## 2025-04-23 RX ADMIN — Medication 150 MCG: at 11:13

## 2025-04-23 RX ADMIN — ASPIRIN 81 MG: 81 TABLET, COATED ORAL at 14:46

## 2025-04-23 RX ADMIN — SODIUM CHLORIDE: 0.9 INJECTION, SOLUTION INTRAVENOUS at 10:14

## 2025-04-23 RX ADMIN — CEFAZOLIN 2 G: 1 INJECTION, POWDER, FOR SOLUTION INTRAMUSCULAR; INTRAVENOUS at 10:35

## 2025-04-23 RX ADMIN — FENTANYL CITRATE 25 MCG: 50 INJECTION, SOLUTION INTRAMUSCULAR; INTRAVENOUS at 12:25

## 2025-04-23 RX ADMIN — SODIUM CHLORIDE, PRESERVATIVE FREE 10 ML: 5 INJECTION INTRAVENOUS at 20:57

## 2025-04-23 RX ADMIN — PROPOFOL 50 MCG/KG/MIN: 10 INJECTION, EMULSION INTRAVENOUS at 10:50

## 2025-04-23 RX ADMIN — LISINOPRIL 5 MG: 5 TABLET ORAL at 14:46

## 2025-04-23 RX ADMIN — PROPOFOL 50 MG: 10 INJECTION, EMULSION INTRAVENOUS at 10:41

## 2025-04-23 RX ADMIN — Medication 200 MCG: at 11:38

## 2025-04-23 RX ADMIN — ACETAMINOPHEN 650 MG: 325 TABLET ORAL at 19:18

## 2025-04-23 RX ADMIN — ALLOPURINOL 200 MG: 100 TABLET ORAL at 14:46

## 2025-04-23 RX ADMIN — Medication 200 MCG: at 11:48

## 2025-04-23 RX ADMIN — MIDAZOLAM 1 MG: 1 INJECTION INTRAMUSCULAR; INTRAVENOUS at 12:25

## 2025-04-23 RX ADMIN — INSULIN GLARGINE 25 UNITS: 100 INJECTION, SOLUTION SUBCUTANEOUS at 20:57

## 2025-04-23 RX ADMIN — SODIUM CHLORIDE: 9 INJECTION, SOLUTION INTRAVENOUS at 10:40

## 2025-04-23 RX ADMIN — SODIUM CHLORIDE: 9 INJECTION, SOLUTION INTRAVENOUS at 10:14

## 2025-04-23 RX ADMIN — PROPOFOL 50 MG: 10 INJECTION, EMULSION INTRAVENOUS at 10:45

## 2025-04-23 RX ADMIN — Medication 200 MCG: at 11:23

## 2025-04-23 RX ADMIN — Medication 2000 UNITS: at 14:46

## 2025-04-23 RX ADMIN — METOPROLOL SUCCINATE 25 MG: 25 TABLET, EXTENDED RELEASE ORAL at 14:46

## 2025-04-23 RX ADMIN — FENTANYL CITRATE 25 MCG: 50 INJECTION, SOLUTION INTRAMUSCULAR; INTRAVENOUS at 10:40

## 2025-04-23 RX ADMIN — MIDAZOLAM 0.5 MG: 1 INJECTION INTRAMUSCULAR; INTRAVENOUS at 10:40

## 2025-04-23 RX ADMIN — PROPOFOL 50 MG: 10 INJECTION, EMULSION INTRAVENOUS at 10:39

## 2025-04-23 RX ADMIN — ATORVASTATIN CALCIUM 40 MG: 40 TABLET, FILM COATED ORAL at 14:46

## 2025-04-23 RX ADMIN — Medication 200 MCG: at 12:38

## 2025-04-23 RX ADMIN — SERTRALINE HYDROCHLORIDE 50 MG: 50 TABLET ORAL at 14:46

## 2025-04-23 RX ADMIN — LEVOTHYROXINE SODIUM 150 MCG: 0.1 TABLET ORAL at 14:46

## 2025-04-23 RX ADMIN — ACETAMINOPHEN 650 MG: 325 TABLET ORAL at 14:45

## 2025-04-23 RX ADMIN — VANCOMYCIN HYDROCHLORIDE 1000 MG: 1 INJECTION, POWDER, LYOPHILIZED, FOR SOLUTION INTRAVENOUS at 21:39

## 2025-04-23 RX ADMIN — FENTANYL CITRATE 25 MCG: 50 INJECTION, SOLUTION INTRAMUSCULAR; INTRAVENOUS at 10:35

## 2025-04-23 RX ADMIN — INSULIN GLARGINE 25 UNITS: 100 INJECTION, SOLUTION SUBCUTANEOUS at 14:45

## 2025-04-23 RX ADMIN — FENTANYL CITRATE 25 MCG: 50 INJECTION, SOLUTION INTRAMUSCULAR; INTRAVENOUS at 10:45

## 2025-04-23 RX ADMIN — VANCOMYCIN HYDROCHLORIDE 1000 MG: 1 INJECTION, POWDER, LYOPHILIZED, FOR SOLUTION INTRAVENOUS at 10:40

## 2025-04-23 RX ADMIN — BUMETANIDE 2 MG: 1 TABLET ORAL at 20:56

## 2025-04-23 RX ADMIN — MIDAZOLAM 0.5 MG: 1 INJECTION INTRAMUSCULAR; INTRAVENOUS at 10:35

## 2025-04-23 ASSESSMENT — PAIN DESCRIPTION - LOCATION
LOCATION: INCISION;SHOULDER
LOCATION: INCISION;SHOULDER

## 2025-04-23 ASSESSMENT — PAIN SCALES - GENERAL
PAINLEVEL_OUTOF10: 8
PAINLEVEL_OUTOF10: 7
PAINLEVEL_OUTOF10: 7
PAINLEVEL_OUTOF10: 0

## 2025-04-23 ASSESSMENT — PAIN DESCRIPTION - ORIENTATION
ORIENTATION: LEFT;UPPER
ORIENTATION: UPPER;LEFT

## 2025-04-23 ASSESSMENT — PAIN - FUNCTIONAL ASSESSMENT: PAIN_FUNCTIONAL_ASSESSMENT: PREVENTS OR INTERFERES SOME ACTIVE ACTIVITIES AND ADLS

## 2025-04-23 ASSESSMENT — PAIN DESCRIPTION - DESCRIPTORS: DESCRIPTORS: ACHING;DISCOMFORT;DULL

## 2025-04-23 NOTE — PROGRESS NOTES
RN called Dr. Pereira about telemetry strip showing NSVT vs failure to pace. Per Dr. Pereira, strip was NSVT, no new orders at this time.

## 2025-04-23 NOTE — ANESTHESIA PRE PROCEDURE
Department of Anesthesiology  Preprocedure Note       Name:  Wander Rocha   Age:  69 y.o.  :  1955                                          MRN:  [id number]         Date:  2025      Surgeon: Surgeon(s):  Elsie Pereira MD    Procedure: Procedure(s):  Biventricular pacemaker upgrade    Medications prior to admission:   Prior to Admission medications    Medication Sig Start Date End Date Taking? Authorizing Provider   bumetanide (BUMEX) 2 MG tablet Take 1 tablet by mouth three times a week 25   Ethel Vickers, APRN - CNP   lisinopril (PRINIVIL;ZESTRIL) 5 MG tablet Take 1 tablet by mouth daily    Provider, MD Holly   atorvastatin (LIPITOR) 40 MG tablet TAKE ONE TABLET BY MOUTH DAILY 25   Oly Crespo MD   apixaban (ELIQUIS) 5 MG TABS tablet Take 1 tablet by mouth 2 times daily 25   Oly Crespo MD   metoprolol succinate (TOPROL XL) 25 MG extended release tablet Take 1 tablet by mouth daily 25   Oly Crespo MD   levothyroxine (SYNTHROID) 100 MCG tablet Take 1 tablet by mouth Daily 25   Oly Crespo MD   sertraline (ZOLOFT) 50 MG tablet Take 1 tablet by mouth daily 25   Oly Crespo MD   allopurinol (ZYLOPRIM) 100 MG tablet Take 2 tablets by mouth daily 25   Oly Crespo MD   ammonium lactate (LAC-HYDRIN) 12 % lotion Apply topically as needed to dry skin. 25   Oly Crespo MD   levothyroxine (SYNTHROID) 25 MCG tablet Take 2 tablets by mouth daily In combination with the 100mcg synthroid; total 150mcg of synthroid daily. 25   Oly Crespo MD   Insulin Pen Needle (PEN NEEDLES) 32G X 6 MM MISC Take insulin daily 24   Oly Crespo MD   insulin glargine (LANTUS SOLOSTAR) 100 UNIT/ML injection pen Inject 25 Units into the skin 2 times daily Changed from basaglar (no longer on formulary). 24   Oly Crespo MD   aspirin 81 MG EC tablet Take 1 tablet by mouth daily  Patient not

## 2025-04-23 NOTE — PROGRESS NOTES
4 Eyes Skin Assessment     NAME:  Wander Rocha  YOB: 1955  MEDICAL RECORD NUMBER:  [id number]    The patient is being assessed for  Admission    I agree that at least one RN has performed a thorough Head to Toe Skin Assessment on the patient. ALL assessment sites listed below have been assessed.      Areas assessed by both nurses:    Head, Face, Ears, Shoulders, Back, Chest, Arms, Elbows, Hands, Sacrum. Buttock, Coccyx, Ischium, Legs. Feet and Heels, and Under Medical Devices         Does the Patient have a Wound? No noted wound(s)       Aric Prevention initiated by RN: No  Wound Care Orders initiated by RN: No    Pressure Injury (Stage 3,4, Unstageable, DTI, NWPT, and Complex wounds) if present, place Wound referral order by RN under : No    New Ostomies, if present place, Ostomy referral order under : No     Nurse 1 eSignature: Electronically signed by Latasha sOorio RN on 4/23/25 at 3:01 PM EDT    **SHARE this note so that the co-signing nurse can place an eSignature**    Nurse 2 eSignature: Electronically signed by Belen Mendez RN on 4/23/25 at 3:01 PM EDT

## 2025-04-23 NOTE — DISCHARGE SUMMARY
[facility] Electrophysiology  Discharge Summary  Patient: Wander Rocha  Medical Record Number: [id number]  Date of Procedure:  4/23/2025  Operating Electrophysiologist: Elsie Pereira MD  Primary Electrophysiologist: Elsie Pereira MD  Referring Physician: Oly Crespo MD     Admission Date:4/23/2025      Discharge Date: 4/24/2025    Patient Active Problem List   Diagnosis    Class 2 obesity due to excess calories with serious comorbidity and body mass index (BMI) of 38.0 to 38.9 in adult    Hyperlipidemia    Essential hypertension    Hypothyroidism    NIKO (obstructive sleep apnea)    Restrictive lung disease secondary to obesity    Pacemaker    Persistent atrial fibrillation (HCC)    Chronic obstructive pulmonary disease, unspecified COPD type (HCC)    Normocytic anemia    Stage 3b chronic kidney disease (HCC)    Proteinuria due to type 2 diabetes mellitus (HCC)    Gout    Chronic systolic (congestive) heart failure    Type 2 diabetes mellitus with diabetic neuropathy, with long-term current use of insulin ([facility])    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, unspecified laterality ([facility])    Major depressive disorder, recurrent, unspecified    S/P cardiac pacemaker procedure    Nonischemic cardiomyopathy (HCC)          Medication List        START taking these medications      doxycycline hyclate 100 MG tablet  Commonly known as: VIBRA-TABS  Take 1 tablet by mouth 2 times daily for 7 days            CONTINUE taking these medications      allopurinol 100 MG tablet  Commonly known as: ZYLOPRIM  Take 2 tablets by mouth daily     ammonium lactate 12 % lotion  Commonly known as: LAC-HYDRIN  Apply topically as needed to dry skin.     apixaban 5 MG Tabs tablet  Commonly known as: Eliquis  Take 1 tablet by mouth 2 times daily     aspirin 81 MG EC tablet  Take 1 tablet by mouth daily     atorvastatin 40 MG tablet  Commonly known as: LIPITOR  TAKE ONE

## 2025-04-23 NOTE — FLOWSHEET NOTE
Patient arrived to the unit with the following belongings:   04/23/25 1411   Belongings   Dental Appliances None   Vision - Corrective Lenses None   Hearing Aid None   Clothing Footwear;Pants;Shirt;Socks;Sweater;Undergarments;At bedside   Jewelry None   Electronic Devices None   Weapons (Notify Protective Services/Security) None   Home Medications None   Valuables Given To Patient   Provide Name(s) of Who Valuable(s) Were Given To sandra

## 2025-04-23 NOTE — ANESTHESIA POSTPROCEDURE EVALUATION
Department of Anesthesiology  Postprocedure Note    Patient: Wander Rocha  MRN: [id number]  YOB: 1955  Date of evaluation: 4/23/2025    Procedure Summary       Date: 04/23/25 Room / [place] [facility] [place] [place] [facility]    Anesthesia Start: 1014 Anesthesia Stop: 1311    Procedure: Biventricular pacemaker upgrade Diagnosis:       Tachycardia-bradycardia (HCC)      (Tachycardia-bradycardia (HCC) [I49.5])    Providers: Elsie Pereira MD Responsible Provider: Clau Farrar MD    Anesthesia Type: MAC ASA Status: 4            Anesthesia Type: No value filed.    Mando Phase I:      Mando Phase II:      Anesthesia Post Evaluation    Patient location during evaluation: PACU  Patient participation: complete - patient participated  Level of consciousness: awake and alert  Airway patency: patent  Nausea & Vomiting: no nausea and no vomiting  Cardiovascular status: blood pressure returned to baseline and hemodynamically stable  Respiratory status: acceptable and spontaneous ventilation  Hydration status: euvolemic  Multimodal analgesia pain management approach  Pain management: adequate    There were no known notable events for this encounter.

## 2025-04-23 NOTE — DISCHARGE INSTRUCTIONS
[facility] Pacemaker Instructions    Medications:  Continue  Eliquis, Toprol  Doxycyline 1 tablet twice a day until 05/08/25.     Incision Care:  Leave brown Aquacel dressing on for 1 week.  Remove aquacel dressing once you have returned home/ discharge from the hospital.  Do not remove the steri strips from your incision.  They will be removed at your follow up appointment.  Keep the incision dry. You may shower; but do not let the water run directly on the incision for 1 week.  Check the area daily. Notify the office at [phone] if you develop any redness, swelling, drainage, warmth, or fever greater than 100 degrees.    Activity:  You may continue regular activity; but limit strenuous or stretching movements of the arm closest to your pacemaker.   Wear the arm immobilizer at all times for 48 hours and then at night for 4 weeks.    Avoid pulling yourself up with that arm.   Do not raise that elbow higher than shoulder height and do not lift anything weighing more than 2 pounds for 4 weeks.   Do not do any activities such as golfing, vacuuming, or mowing the lawn for 4 weeks.    Prevent any hard blows to the pacemaker site.      Driving:  You may drive, if you feel up to it, in 14 days or after your 2 week follow up visit.  Start with local/short trips to familiar places. Avoid highway/ high-speed driving for the first few days after you resume driving.  DO NOT drive until you have stopped taking prescription pain medications.      Special Instructions:  Let your dentist, doctor, or medical specialist know that you have a pacemaker  so precautions can be taken to protect the device.    Your device is considered to be “MRI conditional”; meaning that under certain circumstances, MRI exams may be performed after 6 weeks post implantation  Your pacemaker may set off a metal detector, such as those used in airports and courtrooms.  Let security know that you have a pacemaker & show them your  card.    Remote Monitor:  You may receive your monitor prior to leaving the hospital, if not a monitor can be mailed to your home. In some cases, an zoila can be used with newer smartphones to provide monitoring services. Please discuss this with the device clinic at your follow up appointment in 2 weeks.     ID Card:  You will have a temporary ID card until a permanent card is sent to you by the device company.  The permanent card will look like a 's license or credit card and should arrive within 8 weeks.  Carry your ID card with you at all times.    Follow Up:  You will be seen on Thursday 5/8/25 at 10:00 am at the Device Clinic for incision check and brief pacemaker evaluation.  If you need to reschedule this appointment, call the office at [phone].    [facility]  [address] [place] [address] [place]  [address]  [phone]            Internal medicine    Follow ups  Make appointment with the internal medicine clinic  Please keep all other follow up appointments:  Future Appointments   Date Time Provider Department Center   5/14/2025  7:45 AM [facility] CHF ROOM 2 [facility] [place]   6/3/2025  9:15 AM [facility] CHF ROOM 3 [facility] [place]   6/16/2025  8:30 AM Henrik Fontanez MD [place] [facility]   7/18/2025 10:00 AM Colin Soni DO [place] [facility]   8/4/2025  9:30 AM Martell Momin, JACQUELINE - CNP ELECTRO PHYS [facility]   8/4/2025  9:30 AM SCHEDULE, DEVICE CLINIC 1 [place] ELECTRO PHYS [facility]        Changes in healthcare   Please take all medications as indicated  Diet: cardiac diet   Activity: activity as tolerated  New Medications started during this hospital stay  Jardiance 10 mg daily  Bumex 2 mg two times daily  Acetazolamide (diamox) 250mg daily    Changes to your medications  Take bumex 2 mg two times daily instead of three times a week  Please contact us if you have any concerns, wish to change or make an appointment:  Internal medicine clinic   Phone: [phone]  Fax:

## 2025-04-23 NOTE — PROGRESS NOTES
RN messaged Martell Momin with EP about patient having 8/10 pain at pacer site and no pain meds including tylenol being ordered.

## 2025-04-24 ENCOUNTER — APPOINTMENT (OUTPATIENT)
Dept: GENERAL RADIOLOGY | Age: 70
DRG: 242 | End: 2025-04-24
Attending: INTERNAL MEDICINE
Payer: MEDICARE

## 2025-04-24 LAB
ANION GAP SERPL CALCULATED.3IONS-SCNC: 11 MMOL/L (ref 7–16)
ANION GAP SERPL CALCULATED.3IONS-SCNC: 14 MMOL/L (ref 7–16)
BUN SERPL-MCNC: 52 MG/DL (ref 8–23)
BUN SERPL-MCNC: 55 MG/DL (ref 8–23)
CALCIUM SERPL-MCNC: 8.5 MG/DL (ref 8.8–10.2)
CALCIUM SERPL-MCNC: 8.5 MG/DL (ref 8.8–10.2)
CHLORIDE SERPL-SCNC: 101 MMOL/L (ref 98–107)
CHLORIDE SERPL-SCNC: 99 MMOL/L (ref 98–107)
CO2 SERPL-SCNC: 18 MMOL/L (ref 22–29)
CO2 SERPL-SCNC: 21 MMOL/L (ref 22–29)
CREAT SERPL-MCNC: 1.8 MG/DL (ref 0.7–1.2)
CREAT SERPL-MCNC: 2.2 MG/DL (ref 0.7–1.2)
EKG ATRIAL RATE: 56 BPM
EKG ATRIAL RATE: 70 BPM
EKG P AXIS: 91 DEGREES
EKG P-R INTERVAL: 186 MS
EKG P-R INTERVAL: 308 MS
EKG Q-T INTERVAL: 458 MS
EKG Q-T INTERVAL: 490 MS
EKG QRS DURATION: 164 MS
EKG QRS DURATION: 178 MS
EKG QTC CALCULATION (BAZETT): 493 MS
EKG QTC CALCULATION (BAZETT): 511 MS
EKG R AXIS: -44 DEGREES
EKG R AXIS: 266 DEGREES
EKG T AXIS: 68 DEGREES
EKG T AXIS: 81 DEGREES
EKG VENTRICULAR RATE: 61 BPM
EKG VENTRICULAR RATE: 75 BPM
ERYTHROCYTE [DISTWIDTH] IN BLOOD BY AUTOMATED COUNT: 16.9 % (ref 11.5–15)
GFR, ESTIMATED: 31 ML/MIN/1.73M2
GFR, ESTIMATED: 39 ML/MIN/1.73M2
GLUCOSE BLD-MCNC: 178 MG/DL (ref 74–99)
GLUCOSE SERPL-MCNC: 112 MG/DL (ref 74–99)
GLUCOSE SERPL-MCNC: 156 MG/DL (ref 74–99)
HCT VFR BLD AUTO: 27.7 % (ref 37–54)
HGB BLD-MCNC: 8.7 G/DL (ref 12.5–16.5)
MAGNESIUM SERPL-MCNC: 2.1 MG/DL (ref 1.6–2.4)
MCH RBC QN AUTO: 30.1 PG (ref 26–35)
MCHC RBC AUTO-ENTMCNC: 31.4 G/DL (ref 32–34.5)
MCV RBC AUTO: 95.8 FL (ref 80–99.9)
PLATELET # BLD AUTO: 224 K/UL (ref 130–450)
PMV BLD AUTO: 9.7 FL (ref 7–12)
POTASSIUM SERPL-SCNC: 5.1 MMOL/L (ref 3.5–5.1)
POTASSIUM SERPL-SCNC: 5.3 MMOL/L (ref 3.5–5.1)
RBC # BLD AUTO: 2.89 M/UL (ref 3.8–5.8)
SODIUM SERPL-SCNC: 131 MMOL/L (ref 136–145)
SODIUM SERPL-SCNC: 133 MMOL/L (ref 136–145)
WBC OTHER # BLD: 9.5 K/UL (ref 4.5–11.5)

## 2025-04-24 PROCEDURE — 6370000000 HC RX 637 (ALT 250 FOR IP): Performed by: NURSE PRACTITIONER

## 2025-04-24 PROCEDURE — 2500000003 HC RX 250 WO HCPCS: Performed by: INTERNAL MEDICINE

## 2025-04-24 PROCEDURE — G0378 HOSPITAL OBSERVATION PER HR: HCPCS

## 2025-04-24 PROCEDURE — 99233 SBSQ HOSP IP/OBS HIGH 50: CPT | Performed by: INTERNAL MEDICINE

## 2025-04-24 PROCEDURE — 83735 ASSAY OF MAGNESIUM: CPT

## 2025-04-24 PROCEDURE — 36415 COLL VENOUS BLD VENIPUNCTURE: CPT

## 2025-04-24 PROCEDURE — 93281 PM DEVICE PROGR EVAL MULTI: CPT | Performed by: INTERNAL MEDICINE

## 2025-04-24 PROCEDURE — G0378 HOSPITAL OBSERVATION PER HR: HCPCS | Performed by: INTERNAL MEDICINE

## 2025-04-24 PROCEDURE — 93010 ELECTROCARDIOGRAM REPORT: CPT | Performed by: INTERNAL MEDICINE

## 2025-04-24 PROCEDURE — 6370000000 HC RX 637 (ALT 250 FOR IP): Performed by: INTERNAL MEDICINE

## 2025-04-24 PROCEDURE — 2580000003 HC RX 258: Performed by: INTERNAL MEDICINE

## 2025-04-24 PROCEDURE — 71045 X-RAY EXAM CHEST 1 VIEW: CPT

## 2025-04-24 PROCEDURE — 80048 BASIC METABOLIC PNL TOTAL CA: CPT

## 2025-04-24 PROCEDURE — 85027 COMPLETE CBC AUTOMATED: CPT

## 2025-04-24 PROCEDURE — 96361 HYDRATE IV INFUSION ADD-ON: CPT

## 2025-04-24 PROCEDURE — 82962 GLUCOSE BLOOD TEST: CPT

## 2025-04-24 RX ORDER — GLUCAGON 1 MG/ML
1 KIT INJECTION PRN
Status: DISCONTINUED | OUTPATIENT
Start: 2025-04-24 | End: 2025-05-09 | Stop reason: HOSPADM

## 2025-04-24 RX ORDER — SODIUM CHLORIDE 9 MG/ML
INJECTION, SOLUTION INTRAVENOUS CONTINUOUS
Status: DISCONTINUED | OUTPATIENT
Start: 2025-04-24 | End: 2025-04-25

## 2025-04-24 RX ORDER — DEXTROSE MONOHYDRATE 100 MG/ML
INJECTION, SOLUTION INTRAVENOUS CONTINUOUS PRN
Status: DISCONTINUED | OUTPATIENT
Start: 2025-04-24 | End: 2025-05-09 | Stop reason: HOSPADM

## 2025-04-24 RX ADMIN — ATORVASTATIN CALCIUM 40 MG: 40 TABLET, FILM COATED ORAL at 08:22

## 2025-04-24 RX ADMIN — LEVOTHYROXINE SODIUM 150 MCG: 0.1 TABLET ORAL at 05:44

## 2025-04-24 RX ADMIN — Medication 2000 UNITS: at 08:22

## 2025-04-24 RX ADMIN — INSULIN GLARGINE 25 UNITS: 100 INJECTION, SOLUTION SUBCUTANEOUS at 21:06

## 2025-04-24 RX ADMIN — SODIUM CHLORIDE: 0.9 INJECTION, SOLUTION INTRAVENOUS at 18:50

## 2025-04-24 RX ADMIN — SODIUM CHLORIDE, PRESERVATIVE FREE 10 ML: 5 INJECTION INTRAVENOUS at 08:22

## 2025-04-24 RX ADMIN — ACETAMINOPHEN 650 MG: 325 TABLET ORAL at 08:22

## 2025-04-24 RX ADMIN — FERROUS SULFATE TAB 325 MG (65 MG ELEMENTAL FE) 325 MG: 325 (65 FE) TAB at 08:22

## 2025-04-24 RX ADMIN — METOPROLOL SUCCINATE 25 MG: 25 TABLET, EXTENDED RELEASE ORAL at 08:22

## 2025-04-24 RX ADMIN — ASPIRIN 81 MG: 81 TABLET, COATED ORAL at 08:21

## 2025-04-24 RX ADMIN — ACETAMINOPHEN 650 MG: 325 TABLET ORAL at 16:32

## 2025-04-24 RX ADMIN — LISINOPRIL 5 MG: 5 TABLET ORAL at 08:22

## 2025-04-24 RX ADMIN — SERTRALINE HYDROCHLORIDE 50 MG: 50 TABLET ORAL at 08:23

## 2025-04-24 RX ADMIN — ALLOPURINOL 200 MG: 100 TABLET ORAL at 08:22

## 2025-04-24 RX ADMIN — SODIUM CHLORIDE, PRESERVATIVE FREE 10 ML: 5 INJECTION INTRAVENOUS at 21:07

## 2025-04-24 ASSESSMENT — PAIN DESCRIPTION - LOCATION
LOCATION: INCISION;SHOULDER
LOCATION: INCISION;SHOULDER

## 2025-04-24 ASSESSMENT — PAIN DESCRIPTION - DESCRIPTORS
DESCRIPTORS: ACHING;DISCOMFORT;DULL
DESCRIPTORS: ACHING;DISCOMFORT;DULL

## 2025-04-24 ASSESSMENT — PAIN SCALES - GENERAL
PAINLEVEL_OUTOF10: 4
PAINLEVEL_OUTOF10: 9
PAINLEVEL_OUTOF10: 0
PAINLEVEL_OUTOF10: 8
PAINLEVEL_OUTOF10: 6

## 2025-04-24 ASSESSMENT — PAIN DESCRIPTION - ORIENTATION
ORIENTATION: LEFT;UPPER
ORIENTATION: LEFT;UPPER

## 2025-04-24 ASSESSMENT — PAIN - FUNCTIONAL ASSESSMENT: PAIN_FUNCTIONAL_ASSESSMENT: PREVENTS OR INTERFERES SOME ACTIVE ACTIVITIES AND ADLS

## 2025-04-24 NOTE — PROGRESS NOTES
RN called chemistry asking why blood drawn at 1515 has not resulted yet and she said they do not have the blood yet to run and she will watch for it.

## 2025-04-24 NOTE — PROGRESS NOTES
Spiritual Health History and Assessment/Progress Note  MARION TaverasJerilila Chou    (P) Initial Encounter,  ,  ,      Name: Wander Rocha MRN: [id number]    Age: 69 y.o.     Sex: male   Language: English   Zoroastrian: Orthodoxy   S/P cardiac pacemaker procedure     Date: 4/24/2025                           Spiritual Assessment began in [facility]        Referral/Consult From: (P) Rounding   Encounter Overview/Reason: (P) Initial Encounter  Service Provided For: (P) Patient    Jo Ann, Belief, Meaning:   Patient unable to assess at this time  Family/Friends No family/friends present      Importance and Influence:  Patient unable to assess at this time  Family/Friends No family/friends present    Community:  Patient Other: unknown  Family/Friends No family/friends present    Assessment and Plan of Care:     Patient Interventions include: Other: declined care  Family/Friends Interventions include: No family/friends present    Patient Plan of Care: No spiritual needs identified for follow-up and No future visits per patient/family request  Family/Friends Plan of Care: No family/friends present    Electronically signed by Chaplain Tish on 4/24/2025 at 1:18 PM

## 2025-04-24 NOTE — PROGRESS NOTES
[facility] [place]- Electrophysiology  Inpatient progress note  Wander Rocha  1955  Date of Service: 4/24/2025  PCP: Oly Crespo MD  Electrophysiologist: Elsie Pereira MD  Attending electrophysiologist:Miracle Wood MD          Subjective: 4/17/25:Wander Rocha is seen for follow-up and management of pacemaker in situ and persistent atrial fibrillation. Wander Rocha is a 69 y.o. male who has a history of HTN, HLD, insulin requiring T2DM with peripheral neuropathy, CKD, chronic anemia, pulmonary nodules, NIKO compliant with treatment, restrictive lung disease with qhs supplemental O2 use (PRN during the day), former tobacco smoker, hypothyroidism on HRT, questionable chronic HFpEF, NSVT, SND s/p dual-chamber Medtronic PPM placement 10/2017, persistent AF on chronic Eliquis therapy.  He was seen in office 08/15/2022  and was RV pacing 59.2% at that  time. He then followed up on 01/07/2025 and was RV pacing 94% A TTE was ordered and showed an EF of 45%. A follow up stress test showed low risk 02/24/2025. He is agreeable for CRT-P upgrade. 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 Risks, benefits, and alternatives of CRT-P upgrade were discussed in detail today. These risks include but are not limited to bleeding, infection, blood clot, pneumothorax, hemothorax,cardiac valve damage, cardiac perforation and tamponade required emergent thoracotomy, contrast induced nephropathy leading to short or even long term dialysis, vascular injury requiring emergent surgical repair, lead dislodgement, stroke and even death. The patient understands these risks and agrees to proceed with CRT-P upgrade.     4/24/25: Patient underwent upgrade of his pacemaker to a CRT-P device yesterday.  His initial chest x-ray shows evidence for pulmonary congestion and  LV lead reprogramed to LV1-LV4 new threshold 2.75 @ 1.0 ms    Overall device function is normal  All device programmable settings were evaluated per above and in the scanned document, along with iterative adjustments (capture thresholds) to assess and select the most appropriate final programming to provide for consistent delivery of the appropriate therapy and to verify function of the device.          I have independently reviewed all of the ECGs and rhythm strips per above  Assessment:      1.  CRT-P in situ-DOI 4/23/2025  -- Normal device function.  - Pacemaker dependent with BiV pacing 99.1%.     2. Persistent atrial fibrillation  - [id number]= 3; On Eliquis  - OnToprol XL 25 mg QD  - AF burden 0 %.   - Presents in NSR   - Rate control treatment only.   - Re-education on importance of well controlled HTN (goal BP < 130/80), adequate weight control (goal BMI of < 27), adequate glucose control (goal hemoglobin A1c < 6.5), physical activity consisting of moderate cardiopulmonary exercise up to a goal of 250 min/wk, consider sleep study regarding a formal evaluation for sleep apnea, smoking cessation and limited ETOH intake.    3.  RADHA on CKD  Serum creatinine has increased to 2.2 from a baseline of 1.4 overnight     4. Nonsustained ventricular tachycardia  - Asymptomatic.  - Echo in May 2017 showed LV EF of 70%.  - Echo 10/20/20 LVEF 60-65%   - Echo 8/23/22 LVEF 65%  - Echo 2/10/25 LV EF 45%.  - On Toprol XL.     5. Sinus node dysfunction  - S/p pacemaker implantation    6. Chronic HFmrEF   - Suspect secondary to frequent RV pacing given low risk stress 2/24/25.   - TTE 2/10/25 LVEF 45%   - TTE 8/23/22 LVEF 65%   - GDMT: Toprol XL, Zestril and Bumex.       7. Hypertension  - On Toprol XL, Zestril and Bumex     8. Diabetes mellitus  - On Insulin.  Hemoglobin A1C   Date Value Ref Range Status   01/06/2025 8.9 (H) 4.0 - 5.6 % Final     9. Restrictive lung disease  - Management per pulmonary     10. Obesity /sleep  - Management per Urology/IR  - S/p IR embolization 2/10  - S/p percutaneous ablation by IR 2/14   - pain control   - anti-emetics prn

## 2025-04-24 NOTE — PLAN OF CARE
Problem: Discharge Planning  Goal: Discharge to home or other facility with appropriate resources  4/24/2025 0108 by Sylwia Makrs, RN  Outcome: Progressing  Flowsheets (Taken 4/23/2025 1940)  Discharge to home or other facility with appropriate resources:   Identify barriers to discharge with patient and caregiver   Arrange for needed discharge resources and transportation as appropriate   Identify discharge learning needs (meds, wound care, etc)     Problem: Pain  Goal: Verbalizes/displays adequate comfort level or baseline comfort level  4/24/2025 0108 by Sylwia Marks, RN  Outcome: Progressing     Problem: Discharge Planning  Goal: Discharge to home or other facility with appropriate resources  4/24/2025 0108 by Sylwia Marks, RN  Outcome: Progressing  Flowsheets (Taken 4/23/2025 1940)  Discharge to home or other facility with appropriate resources:   Identify barriers to discharge with patient and caregiver   Arrange for needed discharge resources and transportation as appropriate   Identify discharge learning needs (meds, wound care, etc)

## 2025-04-24 NOTE — CARE COORDINATION
Patient is here under observation POD#1 upgrade of a Medtronic dual chamber pacemaker to Bi-V pacemaker. Per EP, Resume Eliquis on 4/28/25. I met with patient in room to explain role and discuss transition of care. He lives with his wife in a 2 story home with 3 steps to enter, bed 7 bath on main floor. DME at home is walker, cane WC and shower chair. Heis diabetic and has all the glucose testing supplies needed at home. PCP is Uli Viramontes MD and pharmacy is [facility] in [place]. Patient said the plan is to return home with wife when medically ready and his wife will transport him home at time of discharge.   Beth Edmondson RN CM  [phone]

## 2025-04-24 NOTE — PLAN OF CARE
Problem: Chronic Conditions and Co-morbidities  Goal: Patient's chronic conditions and co-morbidity symptoms are monitored and maintained or improved  Outcome: Progressing     Problem: Discharge Planning  Goal: Discharge to home or other facility with appropriate resources  4/24/2025 0725 by Latasha Osorio RN  Outcome: Progressing     Problem: ABCDS Injury Assessment  Goal: Absence of physical injury  Outcome: Progressing     Problem: Pain  Goal: Verbalizes/displays adequate comfort level or baseline comfort level  4/24/2025 0725 by Latasha Osorio RN  Outcome: Progressing     Problem: Safety - Adult  Goal: Free from fall injury  Outcome: Progressing

## 2025-04-25 ENCOUNTER — APPOINTMENT (OUTPATIENT)
Dept: GENERAL RADIOLOGY | Age: 70
DRG: 242 | End: 2025-04-25
Attending: INTERNAL MEDICINE
Payer: MEDICARE

## 2025-04-25 PROBLEM — I50.22 HEART FAILURE WITH MID-RANGE EJECTION FRACTION (HFMEF) (HCC): Status: ACTIVE | Noted: 2025-04-25

## 2025-04-25 LAB
ANION GAP SERPL CALCULATED.3IONS-SCNC: 14 MMOL/L (ref 7–16)
BNP SERPL-MCNC: 2341 PG/ML (ref 0–125)
BUN SERPL-MCNC: 58 MG/DL (ref 8–23)
CALCIUM SERPL-MCNC: 8.5 MG/DL (ref 8.8–10.2)
CHLORIDE SERPL-SCNC: 96 MMOL/L (ref 98–107)
CO2 SERPL-SCNC: 18 MMOL/L (ref 22–29)
CREAT SERPL-MCNC: 2.6 MG/DL (ref 0.7–1.2)
GFR, ESTIMATED: 26 ML/MIN/1.73M2
GLUCOSE BLD-MCNC: 114 MG/DL (ref 74–99)
GLUCOSE BLD-MCNC: 175 MG/DL (ref 74–99)
GLUCOSE SERPL-MCNC: 98 MG/DL (ref 74–99)
OSMOLALITY UR: 320 MOSM/KG (ref 300–900)
POTASSIUM SERPL-SCNC: 4.9 MMOL/L (ref 3.5–5.1)
POTASSIUM, UR: 37.1 MMOL/L
SODIUM SERPL-SCNC: 128 MMOL/L (ref 136–145)
SODIUM UR-SCNC: 36 MMOL/L

## 2025-04-25 PROCEDURE — 36415 COLL VENOUS BLD VENIPUNCTURE: CPT

## 2025-04-25 PROCEDURE — 2500000003 HC RX 250 WO HCPCS: Performed by: INTERNAL MEDICINE

## 2025-04-25 PROCEDURE — 96374 THER/PROPH/DIAG INJ IV PUSH: CPT

## 2025-04-25 PROCEDURE — 71046 X-RAY EXAM CHEST 2 VIEWS: CPT

## 2025-04-25 PROCEDURE — 6370000000 HC RX 637 (ALT 250 FOR IP): Performed by: INTERNAL MEDICINE

## 2025-04-25 PROCEDURE — 82962 GLUCOSE BLOOD TEST: CPT

## 2025-04-25 PROCEDURE — 99233 SBSQ HOSP IP/OBS HIGH 50: CPT | Performed by: INTERNAL MEDICINE

## 2025-04-25 PROCEDURE — 6370000000 HC RX 637 (ALT 250 FOR IP): Performed by: NURSE PRACTITIONER

## 2025-04-25 PROCEDURE — 96361 HYDRATE IV INFUSION ADD-ON: CPT

## 2025-04-25 PROCEDURE — 2580000003 HC RX 258: Performed by: INTERNAL MEDICINE

## 2025-04-25 PROCEDURE — 80048 BASIC METABOLIC PNL TOTAL CA: CPT

## 2025-04-25 PROCEDURE — 2140000000 HC CCU INTERMEDIATE R&B

## 2025-04-25 PROCEDURE — 83880 ASSAY OF NATRIURETIC PEPTIDE: CPT

## 2025-04-25 PROCEDURE — 6360000002 HC RX W HCPCS: Performed by: INTERNAL MEDICINE

## 2025-04-25 PROCEDURE — 83935 ASSAY OF URINE OSMOLALITY: CPT

## 2025-04-25 PROCEDURE — 84133 ASSAY OF URINE POTASSIUM: CPT

## 2025-04-25 PROCEDURE — 84300 ASSAY OF URINE SODIUM: CPT

## 2025-04-25 RX ORDER — BUMETANIDE 2 MG/1
2 TABLET ORAL
Qty: 30 TABLET | Refills: 3 | Status: SHIPPED | OUTPATIENT
Start: 2025-04-28 | End: 2025-05-09 | Stop reason: HOSPADM

## 2025-04-25 RX ORDER — DOBUTAMINE HYDROCHLORIDE 200 MG/100ML
3 INJECTION INTRAVENOUS CONTINUOUS
Status: DISCONTINUED | OUTPATIENT
Start: 2025-04-25 | End: 2025-04-25

## 2025-04-25 RX ORDER — BUMETANIDE 1 MG/1
2 TABLET ORAL
Status: DISCONTINUED | OUTPATIENT
Start: 2025-04-26 | End: 2025-04-26

## 2025-04-25 RX ORDER — BUMETANIDE 0.25 MG/ML
2 INJECTION, SOLUTION INTRAMUSCULAR; INTRAVENOUS ONCE
Status: COMPLETED | OUTPATIENT
Start: 2025-04-25 | End: 2025-04-25

## 2025-04-25 RX ORDER — DOBUTAMINE HYDROCHLORIDE 200 MG/100ML
5 INJECTION INTRAVENOUS CONTINUOUS
Status: DISCONTINUED | OUTPATIENT
Start: 2025-04-25 | End: 2025-04-28

## 2025-04-25 RX ADMIN — ALLOPURINOL 200 MG: 100 TABLET ORAL at 08:45

## 2025-04-25 RX ADMIN — DOBUTAMINE IN DEXTROSE 3 MCG/KG/MIN: 200 INJECTION, SOLUTION INTRAVENOUS at 09:59

## 2025-04-25 RX ADMIN — ASPIRIN 81 MG: 81 TABLET, COATED ORAL at 08:45

## 2025-04-25 RX ADMIN — LEVOTHYROXINE SODIUM 150 MCG: 0.1 TABLET ORAL at 05:55

## 2025-04-25 RX ADMIN — BUMETANIDE 2 MG: 0.25 INJECTION INTRAMUSCULAR; INTRAVENOUS at 09:08

## 2025-04-25 RX ADMIN — SODIUM CHLORIDE, PRESERVATIVE FREE 10 ML: 5 INJECTION INTRAVENOUS at 08:46

## 2025-04-25 RX ADMIN — SERTRALINE HYDROCHLORIDE 50 MG: 50 TABLET ORAL at 08:45

## 2025-04-25 RX ADMIN — INSULIN GLARGINE 25 UNITS: 100 INJECTION, SOLUTION SUBCUTANEOUS at 20:31

## 2025-04-25 RX ADMIN — ACETAMINOPHEN 650 MG: 325 TABLET ORAL at 22:46

## 2025-04-25 RX ADMIN — ATORVASTATIN CALCIUM 40 MG: 40 TABLET, FILM COATED ORAL at 20:35

## 2025-04-25 RX ADMIN — SODIUM CHLORIDE: 0.9 INJECTION, SOLUTION INTRAVENOUS at 04:54

## 2025-04-25 RX ADMIN — Medication 2000 UNITS: at 08:45

## 2025-04-25 RX ADMIN — ACETAMINOPHEN 650 MG: 325 TABLET ORAL at 04:07

## 2025-04-25 RX ADMIN — INSULIN GLARGINE 25 UNITS: 100 INJECTION, SOLUTION SUBCUTANEOUS at 08:45

## 2025-04-25 ASSESSMENT — PAIN DESCRIPTION - ONSET: ONSET: GRADUAL

## 2025-04-25 ASSESSMENT — PAIN DESCRIPTION - LOCATION
LOCATION: SHOULDER

## 2025-04-25 ASSESSMENT — PAIN SCALES - GENERAL
PAINLEVEL_OUTOF10: 0
PAINLEVEL_OUTOF10: 5
PAINLEVEL_OUTOF10: 5
PAINLEVEL_OUTOF10: 8
PAINLEVEL_OUTOF10: 2
PAINLEVEL_OUTOF10: 9

## 2025-04-25 ASSESSMENT — PAIN - FUNCTIONAL ASSESSMENT
PAIN_FUNCTIONAL_ASSESSMENT: PREVENTS OR INTERFERES SOME ACTIVE ACTIVITIES AND ADLS
PAIN_FUNCTIONAL_ASSESSMENT: ACTIVITIES ARE NOT PREVENTED
PAIN_FUNCTIONAL_ASSESSMENT: PREVENTS OR INTERFERES SOME ACTIVE ACTIVITIES AND ADLS

## 2025-04-25 ASSESSMENT — PAIN DESCRIPTION - DESCRIPTORS
DESCRIPTORS: SORE
DESCRIPTORS: SHARP;ACHING;DISCOMFORT
DESCRIPTORS: SORE

## 2025-04-25 ASSESSMENT — PAIN DESCRIPTION - ORIENTATION
ORIENTATION: LEFT

## 2025-04-25 ASSESSMENT — PAIN DESCRIPTION - PAIN TYPE: TYPE: SURGICAL PAIN

## 2025-04-25 ASSESSMENT — PAIN DESCRIPTION - FREQUENCY: FREQUENCY: INTERMITTENT

## 2025-04-25 NOTE — PROGRESS NOTES
Spiritual Health History and Assessment/Progress Note  MARION Alba Jerilila Chou    (P) Spiritual/Emotional Needs,  ,  ,      Name: Wander Rocha MRN: [id number]    Age: 69 y.o.     Sex: male   Language: English   Church: Bahai   S/P cardiac pacemaker procedure     Date: 4/25/2025                           Spiritual Assessment began in [facility]        Referral/Consult From: (P) Rounding   Encounter Overview/Reason: (P) Spiritual/Emotional Needs  Service Provided For: (P) Patient and family together    Jo Ann, Belief, Meaning:   Patient identifies as spiritual  Family/Friends identify as spiritual      Importance and Influence:  Patient has spiritual/personal beliefs that influence decisions regarding their health  Family/Friends have spiritual/personal beliefs that influence decisions regarding the patient's health    Community:  Patient is connected with a spiritual community  Family/Friends are connected with a spiritual community:    Assessment and Plan of Care:     Patient Interventions include: Affirmed coping skills/support systems  Family/Friends Interventions include: Affirmed coping skills/support systems    Patient Plan of Care: No spiritual needs identified for follow-up  Family/Friends Plan of Care: No spiritual needs identified for follow-up    Electronically signed by Chaplain VIR[place] on 4/25/2025 at 2:22 PM

## 2025-04-25 NOTE — CONSULTS
Department of Internal Medicine  Nephrology Attending Consult Note      Reason for Consult:  RADHA  Requesting Physician:  Martell Momin, APRN - CNP    CHIEF COMPLAINT: Pacemaker insertion    History Obtained From:  patient, electronic medical record    HISTORY OF PRESENT ILLNESS:  Briefly, Wander Rocha is a 69-year-old male known to us, past medical history CKD stage 3a probably 2/2 nephrosclerosis and previous episode of ischemic ATN requiring temporary HD in May 2017, recurrent episode of ischemic ATN in March 2022 also requiring HD x2 with fair recovery of renal function, baseline creatinine 1.3 mg/dL, HTN, DM type II, A. fib on apixaban, HFpEF 55 to 60%, NIKO, pacemaker placement, hypothyroidism, hyperlipidemia, was admitted on 4/23/2025 for pacemaker insertion.  Lab work revealed sodium 128, bicarb 18, creatinine up to 2.6 mg/dL, reason for this consultation significant medications include Bumex, metoprolol and lisinopril.  Chest x-ray shows cardiomegaly with mild interstitial edema and a small left pleural effusion and was given IV Bumex.    Past Medical History:        Diagnosis Date    RADHA (acute kidney injury) 03/10/2022    Atrial fibrillation (HCC)     Carpal tunnel syndrome     Colorectal polyps 04/15/2013    Diverticula of colon 04/15/2013    Encounter regarding vascular access for dialysis for ESRD (HCC) 03/12/2022    Hyperlipidemia     Hypertension     Hypothyroidism     Lung nodule     Lung nodules 04/15/2013    Nocturnal hypoxemia due to obesity 08/08/2013    Obesity     NIKO (obstructive sleep apnea) 08/08/2013    [facility]    Osteoarthritis     Pacemaker     DOI 10/3/2017 Medtronic; dual chamber; MRI conditional  Indication: Sinus node dysfunction     Pacemaker     DOI 10/3/2017  Medtronic; dual chamber; MRI conditional   Indication: Sinus node dysfunction       Pinched nerve     Lumbar    Restrictive lung disease secondary to obesity 07/25/2016    S/P laminectomy with spinal

## 2025-04-25 NOTE — CARE COORDINATION
Patient is POD#2 upgrade of a Medtronic dual chamber pacemaker to Bi-V pacemaker. Per EP note from yesterday, initial chest x-ray shows evidence for pulmonary congestion and therefore he was given Bumex postprocedure.  Overnight however his renal function has deteriorated with creatinine rising from 1.4 at baseline to 2.2 today.  His urine output has also decreased throughout the day despite oral fluid intake. Today cr is 2.6. Nephrology consulted. Await input & plan. Patient's wife will transport him home at time of discharge.   Beth Edmondson RN   [phone]

## 2025-04-26 LAB
ANION GAP SERPL CALCULATED.3IONS-SCNC: 14 MMOL/L (ref 7–16)
ANION GAP SERPL CALCULATED.3IONS-SCNC: 15 MMOL/L (ref 7–16)
BUN SERPL-MCNC: 61 MG/DL (ref 8–23)
BUN SERPL-MCNC: 62 MG/DL (ref 8–23)
CALCIUM SERPL-MCNC: 8.3 MG/DL (ref 8.8–10.2)
CALCIUM SERPL-MCNC: 8.4 MG/DL (ref 8.8–10.2)
CHLORIDE SERPL-SCNC: 93 MMOL/L (ref 98–107)
CHLORIDE SERPL-SCNC: 94 MMOL/L (ref 98–107)
CO2 SERPL-SCNC: 18 MMOL/L (ref 22–29)
CO2 SERPL-SCNC: 18 MMOL/L (ref 22–29)
CREAT SERPL-MCNC: 2.2 MG/DL (ref 0.7–1.2)
CREAT SERPL-MCNC: 2.2 MG/DL (ref 0.7–1.2)
GFR, ESTIMATED: 31 ML/MIN/1.73M2
GFR, ESTIMATED: 32 ML/MIN/1.73M2
GLUCOSE BLD-MCNC: 140 MG/DL (ref 74–99)
GLUCOSE BLD-MCNC: 79 MG/DL (ref 74–99)
GLUCOSE SERPL-MCNC: 122 MG/DL (ref 74–99)
GLUCOSE SERPL-MCNC: 128 MG/DL (ref 74–99)
POTASSIUM SERPL-SCNC: 4.7 MMOL/L (ref 3.5–5.1)
POTASSIUM SERPL-SCNC: 4.8 MMOL/L (ref 3.5–5.1)
SODIUM SERPL-SCNC: 125 MMOL/L (ref 136–145)
SODIUM SERPL-SCNC: 126 MMOL/L (ref 136–145)

## 2025-04-26 PROCEDURE — 6370000000 HC RX 637 (ALT 250 FOR IP): Performed by: NURSE PRACTITIONER

## 2025-04-26 PROCEDURE — 6370000000 HC RX 637 (ALT 250 FOR IP): Performed by: INTERNAL MEDICINE

## 2025-04-26 PROCEDURE — 36415 COLL VENOUS BLD VENIPUNCTURE: CPT

## 2025-04-26 PROCEDURE — 99233 SBSQ HOSP IP/OBS HIGH 50: CPT | Performed by: INTERNAL MEDICINE

## 2025-04-26 PROCEDURE — 2140000000 HC CCU INTERMEDIATE R&B

## 2025-04-26 PROCEDURE — 6360000002 HC RX W HCPCS: Performed by: INTERNAL MEDICINE

## 2025-04-26 PROCEDURE — 82962 GLUCOSE BLOOD TEST: CPT

## 2025-04-26 PROCEDURE — 2500000003 HC RX 250 WO HCPCS: Performed by: INTERNAL MEDICINE

## 2025-04-26 PROCEDURE — 2700000000 HC OXYGEN THERAPY PER DAY

## 2025-04-26 PROCEDURE — 80048 BASIC METABOLIC PNL TOTAL CA: CPT

## 2025-04-26 RX ORDER — BUMETANIDE 0.25 MG/ML
2 INJECTION, SOLUTION INTRAMUSCULAR; INTRAVENOUS 2 TIMES DAILY
Status: DISCONTINUED | OUTPATIENT
Start: 2025-04-26 | End: 2025-04-29

## 2025-04-26 RX ADMIN — ACETAMINOPHEN 650 MG: 325 TABLET ORAL at 17:55

## 2025-04-26 RX ADMIN — DOBUTAMINE IN DEXTROSE 5 MCG/KG/MIN: 200 INJECTION, SOLUTION INTRAVENOUS at 05:13

## 2025-04-26 RX ADMIN — INSULIN GLARGINE 25 UNITS: 100 INJECTION, SOLUTION SUBCUTANEOUS at 10:18

## 2025-04-26 RX ADMIN — SODIUM CHLORIDE, PRESERVATIVE FREE 10 ML: 5 INJECTION INTRAVENOUS at 06:09

## 2025-04-26 RX ADMIN — ALLOPURINOL 200 MG: 100 TABLET ORAL at 10:15

## 2025-04-26 RX ADMIN — ACETAMINOPHEN 650 MG: 325 TABLET ORAL at 10:13

## 2025-04-26 RX ADMIN — ONDANSETRON 4 MG: 2 INJECTION, SOLUTION INTRAMUSCULAR; INTRAVENOUS at 06:08

## 2025-04-26 RX ADMIN — SODIUM CHLORIDE, PRESERVATIVE FREE 10 ML: 5 INJECTION INTRAVENOUS at 07:40

## 2025-04-26 RX ADMIN — ASPIRIN 81 MG: 81 TABLET, COATED ORAL at 10:15

## 2025-04-26 RX ADMIN — DOBUTAMINE IN DEXTROSE 5 MCG/KG/MIN: 200 INJECTION, SOLUTION INTRAVENOUS at 17:38

## 2025-04-26 RX ADMIN — SODIUM CHLORIDE, PRESERVATIVE FREE 10 ML: 5 INJECTION INTRAVENOUS at 06:13

## 2025-04-26 RX ADMIN — Medication 2000 UNITS: at 10:15

## 2025-04-26 RX ADMIN — FERROUS SULFATE TAB 325 MG (65 MG ELEMENTAL FE) 325 MG: 325 (65 FE) TAB at 10:14

## 2025-04-26 RX ADMIN — BUMETANIDE 2 MG: 0.25 INJECTION INTRAMUSCULAR; INTRAVENOUS at 17:36

## 2025-04-26 RX ADMIN — LEVOTHYROXINE SODIUM 150 MCG: 0.1 TABLET ORAL at 06:11

## 2025-04-26 RX ADMIN — SERTRALINE HYDROCHLORIDE 50 MG: 50 TABLET ORAL at 10:15

## 2025-04-26 RX ADMIN — ATORVASTATIN CALCIUM 40 MG: 40 TABLET, FILM COATED ORAL at 10:14

## 2025-04-26 RX ADMIN — BUMETANIDE 2 MG: 0.25 INJECTION INTRAMUSCULAR; INTRAVENOUS at 07:40

## 2025-04-26 ASSESSMENT — PAIN SCALES - GENERAL
PAINLEVEL_OUTOF10: 10
PAINLEVEL_OUTOF10: 4
PAINLEVEL_OUTOF10: 2

## 2025-04-26 ASSESSMENT — PAIN DESCRIPTION - LOCATION
LOCATION: BACK
LOCATION: NECK
LOCATION: SHOULDER

## 2025-04-26 ASSESSMENT — PAIN DESCRIPTION - ORIENTATION
ORIENTATION: RIGHT;LEFT
ORIENTATION: RIGHT;LEFT
ORIENTATION: LEFT

## 2025-04-26 ASSESSMENT — PAIN DESCRIPTION - DESCRIPTORS
DESCRIPTORS: ACHING;THROBBING
DESCRIPTORS: SORE
DESCRIPTORS: ACHING

## 2025-04-26 NOTE — PROGRESS NOTES
Patient not able to sleep in the bed.  Patient requests OOB to chair to sleep  Refused sleeper chair.    Pillow placed under L arm for support. Patient noted to be short of breath when transferring from bed to chair.  Patient's oxygen saturation in the 80's on room air.   SPox 99 percent on 1L.   No acute respiratory distress. Voiding.  IV dobutamine continues.  Breath sounds diminished.

## 2025-04-26 NOTE — PROGRESS NOTES
Dr. Wood notified patient has had a 10 lb weight  gain in 24 hours Patient SOB with exertion and oxygen saturation in 80's with exertion on room air. Oxygen 1 L  =99 percent at rest at 2330  last night.  However patient nauseated and states he vomited x1 shortly before 5 am.   He states he felt shaky and not well.  Patient not able to be in bed due to abdomen size and breathing difficulty.   Has been in chair.  Bases of lungs with crackles.    Oxygen 3L AM BMP reviewed with Dr. Wood.   Bumex 2mg IV ordered now and bid. Clau Rivera RN

## 2025-04-26 NOTE — PROGRESS NOTES
[facility] [place]- Electrophysiology  Inpatient progress note  Wander Rocha  1955  Date of Service: 4/26/2025  PCP: Oly Crespo MD  Electrophysiologist: Elsie Pereira MD  Attending electrophysiologist:Miracle Wood MD          Subjective: 4/17/25:Wander Rocha is seen for follow-up and management of pacemaker in situ and persistent atrial fibrillation. Wander Rocha is a 69 y.o. male who has a history of HTN, HLD, insulin requiring T2DM with peripheral neuropathy, CKD, chronic anemia, pulmonary nodules, NIKO compliant with treatment, restrictive lung disease with qhs supplemental O2 use (PRN during the day), former tobacco smoker, hypothyroidism on HRT, questionable chronic HFpEF, NSVT, SND s/p dual-chamber Medtronic PPM placement 10/2017, persistent AF on chronic Eliquis therapy.  He was seen in office 08/15/2022  and was RV pacing 59.2% at that  time. He then followed up on 01/07/2025 and was RV pacing 94% A TTE was ordered and showed an EF of 45%. A follow up stress test showed low risk 02/24/2025. He is agreeable for CRT-P upgrade. 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 Risks, benefits, and alternatives of CRT-P upgrade were discussed in detail today. These risks include but are not limited to bleeding, infection, blood clot, pneumothorax, hemothorax,cardiac valve damage, cardiac perforation and tamponade required emergent thoracotomy, contrast induced nephropathy leading to short or even long term dialysis, vascular injury requiring emergent surgical repair, lead dislodgement, stroke and even death. The patient understands these risks and agrees to proceed with CRT-P upgrade.     4/24/25: Patient underwent upgrade of his pacemaker to a CRT-P device yesterday.  His initial chest x-ray shows evidence for pulmonary congestion and

## 2025-04-26 NOTE — PLAN OF CARE
Problem: Chronic Conditions and Co-morbidities  Goal: Patient's chronic conditions and co-morbidity symptoms are monitored and maintained or improved  Outcome: Progressing  Flowsheets (Taken 4/25/2025 4716)  Care Plan - Patient's Chronic Conditions and Co-Morbidity Symptoms are Monitored and Maintained or Improved: Monitor and assess patient's chronic conditions and comorbid symptoms for stability, deterioration, or improvement     Problem: ABCDS Injury Assessment  Goal: Absence of physical injury  Outcome: Progressing     Problem: Pain  Goal: Verbalizes/displays adequate comfort level or baseline comfort level  Outcome: Progressing  Flowsheets (Taken 4/26/2025 0054)  Verbalizes/displays adequate comfort level or baseline comfort level:   Encourage patient to monitor pain and request assistance   Assess pain using appropriate pain scale     Problem: Safety - Adult  Goal: Free from fall injury  Outcome: Progressing

## 2025-04-26 NOTE — PROGRESS NOTES
Left Ventricle   Left ventricular internal dimensions were normal in diastole and systole.   Borderline concentric left ventricular hypertrophy.   Normal left ventricular ejection fraction.   Ejection fraction is visually estimated at 70%.      Right Ventricle   Mildly dilated right ventricle.   Right ventricle global systolic function is low normal .      Left Atrium   The left atrium is mildly dilated.   Interatrial septum appears intact.      Right Atrium   Normal right atrium size.      Mitral Valve   Structurally normal mitral valve.      Tricuspid Valve   The tricuspid valve was not well visualized.   The tricuspid valve appears structurally normal.      Aortic Valve   The aortic valve leaflets were not well visualized.   The aortic valve appears moderately sclerotic.      Pulmonic Valve   The pulmonic valve was not well visualized.      Pericardial Effusion   No evidence of pericardial effusion.      Aorta   Aortic root dimension within normal limits.   Miscellaneous   Dilated Inferior Vena Cava      Conclusions      Summary   Technically suboptimal study   Left ventricular internal dimensions were normal in diastole and systole.   Borderline concentric left ventricular hypertrophy.   Normal left ventricular ejection fraction.   The left atrium is mildly dilated.   Mildly dilated right ventricle.   Right ventricle global systolic function is low normal .   The aortic valve appears moderately sclerotic.   Dilated Inferior Vena Cava      Signature      ----------------------------------------------------------------   Electronically signed by Migue Ruggiero MD(Interpreting   physician) on 05/26/2017 03:39 PM        Echocardiogram 10/20/20:    Findings      Left Ventricle   Normal left ventricular size and systolic function.   Ejection fraction is visually estimated at 60-65%.   Indeterminate diastolic function.   No regional wall motion abnormalities seen.   Moderate left ventricular concentric hypertrophy  physician) on 10/20/2020 04:05 PM   ----------------------------------------------------------------     Echocardiogram 9/18/21:   Findings      Left Ventricle   Left ventricle is normal in size .   Micro-bubble contrast injected to enhance left ventricular visualization.   No regional wall motion abnormalities seen.   Normal left ventricular ejection fraction.   Ejection fraction is visually estimated at 55-60%.   Indeterminate diastolic function.      Right Ventricle   The right ventricle was not clearly visualized.   Grossly normal right ventricular size and function.      Left Atrium   Normal sized left atrium.   Interatrial septum appears intact.      Right Atrium   Right atrium is not clearly visualized.      Mitral Valve   Structurally normal mitral valve.      Tricuspid Valve   The tricuspid valve was not well visualized.      Aortic Valve   The aortic valve leaflets were not well visualized.      Pulmonic Valve   The pulmonic valve was not well visualized.      Pericardial Effusion   No evidence of pericardial effusion.      Aorta   Aortic root dimension within normal limits.      Conclusions      Summary   Technically suboptimal and limited study.   Left ventricle is normal in size .   No regional wall motion abnormalities seen.   Normal left ventricular ejection fraction.      Signature      ----------------------------------------------------------------   Electronically signed by Migue Ruggiero MD(Interpreting   physician) on 09/20/2021 04:25 PM   ----------------------------------------------------------------     Nuclear Stress test 2/11/25:     Stress Combined Conclusion: Findings suggest a low risk of cardiac events.    Stress Function: Left ventricular function post-stress is normal. Post-stress ejection fraction is 54%.    Perfusion Comments: LV perfusion is equivocal.    Perfusion Defect: There is a severe left ventricular stress perfusion defect that is medium in size present in the inferior

## 2025-04-26 NOTE — PROGRESS NOTES
Ambulated in hallway with x2 assist ambulated with 4 L NC Pt became SOB sat him in a wheelchair and took back to room. Pulse ox 91% on 4L NC. Pt recovered after 10 mins. Sitting on side of bed. Call light within reach instructed to call for any needs.

## 2025-04-27 LAB
ANION GAP SERPL CALCULATED.3IONS-SCNC: 15 MMOL/L (ref 7–16)
BNP SERPL-MCNC: 2316 PG/ML (ref 0–125)
BUN SERPL-MCNC: 59 MG/DL (ref 8–23)
CALCIUM SERPL-MCNC: 8.2 MG/DL (ref 8.8–10.2)
CHLORIDE SERPL-SCNC: 93 MMOL/L (ref 98–107)
CO2 SERPL-SCNC: 18 MMOL/L (ref 22–29)
CREAT SERPL-MCNC: 2.1 MG/DL (ref 0.7–1.2)
GFR, ESTIMATED: 32 ML/MIN/1.73M2
GLUCOSE BLD-MCNC: 136 MG/DL (ref 74–99)
GLUCOSE SERPL-MCNC: 79 MG/DL (ref 74–99)
POTASSIUM SERPL-SCNC: 4.7 MMOL/L (ref 3.5–5.1)
SODIUM SERPL-SCNC: 125 MMOL/L (ref 136–145)

## 2025-04-27 PROCEDURE — 6360000002 HC RX W HCPCS: Performed by: INTERNAL MEDICINE

## 2025-04-27 PROCEDURE — 2700000000 HC OXYGEN THERAPY PER DAY

## 2025-04-27 PROCEDURE — 99232 SBSQ HOSP IP/OBS MODERATE 35: CPT | Performed by: INTERNAL MEDICINE

## 2025-04-27 PROCEDURE — 2500000003 HC RX 250 WO HCPCS: Performed by: INTERNAL MEDICINE

## 2025-04-27 PROCEDURE — 6370000000 HC RX 637 (ALT 250 FOR IP): Performed by: INTERNAL MEDICINE

## 2025-04-27 PROCEDURE — 82962 GLUCOSE BLOOD TEST: CPT

## 2025-04-27 PROCEDURE — 2140000000 HC CCU INTERMEDIATE R&B

## 2025-04-27 PROCEDURE — 83880 ASSAY OF NATRIURETIC PEPTIDE: CPT

## 2025-04-27 PROCEDURE — 36415 COLL VENOUS BLD VENIPUNCTURE: CPT

## 2025-04-27 PROCEDURE — 6370000000 HC RX 637 (ALT 250 FOR IP): Performed by: NURSE PRACTITIONER

## 2025-04-27 PROCEDURE — 80048 BASIC METABOLIC PNL TOTAL CA: CPT

## 2025-04-27 RX ORDER — HYDROCODONE BITARTRATE AND ACETAMINOPHEN 5; 325 MG/1; MG/1
1 TABLET ORAL EVERY 6 HOURS PRN
Status: DISCONTINUED | OUTPATIENT
Start: 2025-04-27 | End: 2025-05-09 | Stop reason: HOSPADM

## 2025-04-27 RX ADMIN — ONDANSETRON 4 MG: 2 INJECTION, SOLUTION INTRAMUSCULAR; INTRAVENOUS at 01:26

## 2025-04-27 RX ADMIN — ASPIRIN 81 MG: 81 TABLET, COATED ORAL at 11:00

## 2025-04-27 RX ADMIN — BUMETANIDE 2 MG: 0.25 INJECTION INTRAMUSCULAR; INTRAVENOUS at 17:51

## 2025-04-27 RX ADMIN — ACETAMINOPHEN 650 MG: 325 TABLET ORAL at 11:20

## 2025-04-27 RX ADMIN — HYDROCODONE BITARTRATE AND ACETAMINOPHEN 1 TABLET: 5; 325 TABLET ORAL at 14:59

## 2025-04-27 RX ADMIN — INSULIN GLARGINE 25 UNITS: 100 INJECTION, SOLUTION SUBCUTANEOUS at 20:47

## 2025-04-27 RX ADMIN — SODIUM CHLORIDE, PRESERVATIVE FREE 10 ML: 5 INJECTION INTRAVENOUS at 20:49

## 2025-04-27 RX ADMIN — DOBUTAMINE IN DEXTROSE 5 MCG/KG/MIN: 200 INJECTION, SOLUTION INTRAVENOUS at 13:48

## 2025-04-27 RX ADMIN — ONDANSETRON 4 MG: 2 INJECTION, SOLUTION INTRAMUSCULAR; INTRAVENOUS at 23:12

## 2025-04-27 RX ADMIN — SERTRALINE HYDROCHLORIDE 50 MG: 50 TABLET ORAL at 11:30

## 2025-04-27 RX ADMIN — LEVOTHYROXINE SODIUM 150 MCG: 0.1 TABLET ORAL at 06:30

## 2025-04-27 RX ADMIN — Medication 2000 UNITS: at 11:00

## 2025-04-27 RX ADMIN — ACETAMINOPHEN 650 MG: 325 TABLET ORAL at 02:45

## 2025-04-27 RX ADMIN — SODIUM CHLORIDE, PRESERVATIVE FREE 10 ML: 5 INJECTION INTRAVENOUS at 20:47

## 2025-04-27 RX ADMIN — BUMETANIDE 2 MG: 0.25 INJECTION INTRAMUSCULAR; INTRAVENOUS at 09:00

## 2025-04-27 RX ADMIN — ALLOPURINOL 200 MG: 100 TABLET ORAL at 11:00

## 2025-04-27 RX ADMIN — ATORVASTATIN CALCIUM 40 MG: 40 TABLET, FILM COATED ORAL at 11:20

## 2025-04-27 RX ADMIN — SODIUM CHLORIDE, PRESERVATIVE FREE 10 ML: 5 INJECTION INTRAVENOUS at 15:00

## 2025-04-27 ASSESSMENT — PAIN DESCRIPTION - DESCRIPTORS
DESCRIPTORS: ACHING;DISCOMFORT;DULL
DESCRIPTORS: ACHING;THROBBING

## 2025-04-27 ASSESSMENT — PAIN DESCRIPTION - LOCATION
LOCATION: BACK
LOCATION: NECK
LOCATION: NECK;BACK

## 2025-04-27 ASSESSMENT — PAIN SCALES - GENERAL
PAINLEVEL_OUTOF10: 2
PAINLEVEL_OUTOF10: 5
PAINLEVEL_OUTOF10: 9
PAINLEVEL_OUTOF10: 10

## 2025-04-27 ASSESSMENT — PAIN DESCRIPTION - FREQUENCY: FREQUENCY: CONTINUOUS

## 2025-04-27 ASSESSMENT — PAIN DESCRIPTION - PAIN TYPE: TYPE: ACUTE PAIN

## 2025-04-27 ASSESSMENT — PAIN DESCRIPTION - ONSET: ONSET: GRADUAL

## 2025-04-27 ASSESSMENT — PAIN DESCRIPTION - ORIENTATION: ORIENTATION: LOWER

## 2025-04-27 NOTE — PROGRESS NOTES
04/15/2013    Done at [facility], March 2012     Sinus node dysfunction (HCC)     Skin lesion of face 11/16/2022    Type II or unspecified type diabetes mellitus without mention of complication, not stated as uncontrolled      Past Surgical History:        Procedure Laterality Date    BACK SURGERY  2012    [facility]. Pinched nerve in back    BACK SURGERY  05/30/2017    BACK SURGERY N/A 11/8/2022    EXCISON OF SOFT TISSUE NEOPLASM OF UPPER BACK performed by Santana Maurer MD at [facility] OR    BACK SURGERY Left 1/26/2023    BACK LESION EXCISION SKIN GRAFT performed by Santana Maurer MD at [facility] OR    COLONOSCOPY  2007    multiple colon polyps    COLONOSCOPY  06/04/2012    COLONOSCOPY    COLONOSCOPY  04/26/2022    polyp; diverticula--sarah    COLONOSCOPY N/A 4/26/2022    COLONOSCOPY POLYPECTOMY HOT BIOPSY (Snare) performed by Santana Maurer MD at [facility] ENDOSCOPY    EP DEVICE PROCEDURE N/A 4/17/2025    Venogram performed by Elsie Pereira MD at [facility] CARDIAC CATH LAB    EP DEVICE PROCEDURE N/A 4/23/2025    Biventricular pacemaker upgrade performed by Elsie Pereira MD at [facility] CARDIAC CATH LAB    EYE SURGERY      lennie cataracts implant    HERNIA REPAIR      umbilical    PACEMAKER PLACEMENT  10/03/2017    Medvanessa dual chamber    Dr. Vizcarra    ROTATOR CUFF REPAIR Right      Current Medications:    Current Facility-Administered Medications: bumetanide (BUMEX) injection 2 mg, 2 mg, IntraVENous, BID  DOBUTamine (DOBUTREX) 500 mg in dextrose 5 % 250 mL infusion, 5 mcg/kg/min, IntraVENous, Continuous  glucose chewable tablet 16 g, 4 tablet, Oral, PRN  dextrose bolus 10% 125 mL, 125 mL, IntraVENous, PRN **OR** dextrose bolus 10% 250 mL, 250 mL, IntraVENous, PRN  glucagon injection 1 mg, 1 mg, SubCUTAneous, PRN  dextrose 10 % infusion, , IntraVENous, Continuous PRN  vitamin D (CHOLECALCIFEROL) tablet 2,000 Units, 2,000 Units, Oral, Daily  aspirin EC tablet 81 mg, 81 mg, Oral, Daily  ferrous sulfate (IRON 325)  negative  MUSCULOSKELETAL:  negative  NEUROLOGICAL:  negative  PHYSICAL EXAM:      Vitals:    VITALS:  BP (!) 121/93   Pulse 94   Temp 97.1 °F (36.2 °C) (Temporal)   Resp 23   Ht 1.702 m (5' 7\")   Wt 103.9 kg (229 lb)   SpO2 100%   BMI 35.87 kg/m²   24HR INTAKE/OUTPUT:    Intake/Output Summary (Last 24 hours) at 4/27/2025 1029  Last data filed at 4/27/2025 0957  Gross per 24 hour   Intake 791.89 ml   Output 1200 ml   Net -408.11 ml       Constitutional:  Awake, alert, oriented, in NAD  HEENT:  PERRLA, normocephalic, atraumatic  Respiratory: Diminished  Cardiovascular/Edema:  RRR, normal S1, normal S2  Gastrointestinal:  Soft, flat, non-distended, non-tender  Neurologic:  Nonfocal  Skin:  warm, dry, no rashes, no lesions    DATA:    CBC:   Lab Results   Component Value Date/Time    WBC 9.5 04/24/2025 05:08 AM    RBC 2.89 04/24/2025 05:08 AM    HGB 8.7 04/24/2025 05:08 AM    HCT 27.7 04/24/2025 05:08 AM    MCV 95.8 04/24/2025 05:08 AM    MCH 30.1 04/24/2025 05:08 AM    MCHC 31.4 04/24/2025 05:08 AM    RDW 16.9 04/24/2025 05:08 AM     04/24/2025 05:08 AM    MPV 9.7 04/24/2025 05:08 AM     CMP:    Lab Results   Component Value Date/Time     04/27/2025 05:26 AM    K 4.7 04/27/2025 05:26 AM    K 5.1 01/08/2023 05:35 AM    CL 93 04/27/2025 05:26 AM    CO2 18 04/27/2025 05:26 AM    BUN 59 04/27/2025 05:26 AM    CREATININE 2.1 04/27/2025 05:26 AM    GFRAA >60 10/03/2022 10:55 AM    LABGLOM 32 04/27/2025 05:26 AM    LABGLOM 60 04/19/2024 10:45 AM    GLUCOSE 79 04/27/2025 05:26 AM    GLUCOSE 133 04/18/2012 08:43 AM    CALCIUM 8.2 04/27/2025 05:26 AM    BILITOT 1.5 12/21/2023 04:59 PM    ALKPHOS 134 12/21/2023 04:59 PM    AST 26 12/21/2023 04:59 PM    ALT 23 12/21/2023 04:59 PM     Magnesium:    Lab Results   Component Value Date/Time    MG 2.1 04/24/2025 05:08 AM     Phosphorus:    Lab Results   Component Value Date/Time    PHOS 4.1 09/26/2024 09:35 AM     Radiology Review:    XR CHEST (2 VW)

## 2025-04-27 NOTE — PROGRESS NOTES
[facility] [place]- Electrophysiology  Inpatient progress note  Wander Rocha  1955  Date of Service: 4/27/2025  PCP: Oly Crespo MD  Electrophysiologist: Elsie Pereira MD  Attending electrophysiologist:Miracle Wood MD          Subjective: 4/17/25:Wander Rocha is seen for follow-up and management of pacemaker in situ and persistent atrial fibrillation. Wander Rocha is a 69 y.o. male who has a history of HTN, HLD, insulin requiring T2DM with peripheral neuropathy, CKD, chronic anemia, pulmonary nodules, NIKO compliant with treatment, restrictive lung disease with qhs supplemental O2 use (PRN during the day), former tobacco smoker, hypothyroidism on HRT, questionable chronic HFpEF, NSVT, SND s/p dual-chamber Medtronic PPM placement 10/2017, persistent AF on chronic Eliquis therapy.  He was seen in office 08/15/2022  and was RV pacing 59.2% at that  time. He then followed up on 01/07/2025 and was RV pacing 94% A TTE was ordered and showed an EF of 45%. A follow up stress test showed low risk 02/24/2025. He is agreeable for CRT-P upgrade. 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 Risks, benefits, and alternatives of CRT-P upgrade were discussed in detail today. These risks include but are not limited to bleeding, infection, blood clot, pneumothorax, hemothorax,cardiac valve damage, cardiac perforation and tamponade required emergent thoracotomy, contrast induced nephropathy leading to short or even long term dialysis, vascular injury requiring emergent surgical repair, lead dislodgement, stroke and even death. The patient understands these risks and agrees to proceed with CRT-P upgrade.     4/24/25: Patient underwent upgrade of his pacemaker to a CRT-P device yesterday.  His initial chest x-ray shows evidence for pulmonary congestion and  lateral sclerosis Father     Cancer Brother     High Blood Pressure Brother     Diabetes Brother      There is no family history of sudden cardiac arrest    Social History     Tobacco Use    Smoking status: Former     Current packs/day: 0.00     Average packs/day: 0.1 packs/day for 35.0 years (3.5 ttl pk-yrs)     Types: Cigarettes     Start date: 1969     Quit date: 2004     Years since quittin.4     Passive exposure: Past    Smokeless tobacco: Former     Quit date:    Substance Use Topics    Alcohol use: No     Alcohol/week: 0.0 standard drinks of alcohol       Current Facility-Administered Medications   Medication Dose Route Frequency Provider Last Rate Last Admin    bumetanide (BUMEX) injection 2 mg  2 mg IntraVENous BID Miracle Wood MD   2 mg at 25 0900    DOBUTamine (DOBUTREX) 500 mg in dextrose 5 % 250 mL infusion  5 mcg/kg/min IntraVENous Continuous Miracle Wood MD 14.9 mL/hr at 25 0633 5 mcg/kg/min at 25 0633    glucose chewable tablet 16 g  4 tablet Oral PRN Miracle Wood MD        dextrose bolus 10% 125 mL  125 mL IntraVENous PRN Miracle Wood MD        Or    dextrose bolus 10% 250 mL  250 mL IntraVENous PRN Miracle Wood MD        glucagon injection 1 mg  1 mg SubCUTAneous PRN Miracle Wood MD        dextrose 10 % infusion   IntraVENous Continuous PRN Miracle Wood MD        vitamin D (CHOLECALCIFEROL) tablet 2,000 Units  2,000 Units Oral Daily Elsie Pereira MD   2,000 Units at 25 1100    aspirin EC tablet 81 mg  81 mg Oral Daily Elsie Pereira MD   81 mg at 25 1100    ferrous sulfate (IRON 325) tablet 325 mg  325 mg Oral Every Other Day Elsie Pereira MD   325 mg at 25 1014    insulin glargine (LANTUS) injection vial 25 Units  25 Units SubCUTAneous BID Elsie Pereira MD   25 Units at 25 1018    atorvastatin (LIPITOR) tablet 40 mg  40 mg Oral Daily Elsie Pereira MD   40 mg at 25 1120    [Held by

## 2025-04-28 ENCOUNTER — APPOINTMENT (OUTPATIENT)
Dept: INTERNAL MEDICINE | Age: 70
DRG: 242 | End: 2025-04-28
Attending: INTERNAL MEDICINE
Payer: MEDICARE

## 2025-04-28 ENCOUNTER — APPOINTMENT (OUTPATIENT)
Dept: GENERAL RADIOLOGY | Age: 70
DRG: 242 | End: 2025-04-28
Attending: INTERNAL MEDICINE
Payer: MEDICARE

## 2025-04-28 LAB
ALBUMIN SERPL-MCNC: 3.9 G/DL (ref 3.5–5.2)
ALP SERPL-CCNC: 134 U/L (ref 40–129)
ALT SERPL-CCNC: 27 U/L (ref 0–50)
ANION GAP SERPL CALCULATED.3IONS-SCNC: 14 MMOL/L (ref 7–16)
ANION GAP SERPL CALCULATED.3IONS-SCNC: 14 MMOL/L (ref 7–16)
AST SERPL-CCNC: 42 U/L (ref 0–50)
BILIRUB DIRECT SERPL-MCNC: 0.4 MG/DL (ref 0–0.2)
BILIRUB INDIRECT SERPL-MCNC: 0.3 MG/DL (ref 0–1)
BILIRUB SERPL-MCNC: 0.8 MG/DL (ref 0–1.2)
BNP SERPL-MCNC: 2990 PG/ML (ref 0–125)
BUN SERPL-MCNC: 59 MG/DL (ref 8–23)
BUN SERPL-MCNC: 63 MG/DL (ref 8–23)
CALCIUM SERPL-MCNC: 8 MG/DL (ref 8.8–10.2)
CALCIUM SERPL-MCNC: 8.3 MG/DL (ref 8.8–10.2)
CHLORIDE SERPL-SCNC: 90 MMOL/L (ref 98–107)
CHLORIDE SERPL-SCNC: 91 MMOL/L (ref 98–107)
CO2 SERPL-SCNC: 17 MMOL/L (ref 22–29)
CO2 SERPL-SCNC: 18 MMOL/L (ref 22–29)
CREAT SERPL-MCNC: 2.2 MG/DL (ref 0.7–1.2)
CREAT SERPL-MCNC: 2.4 MG/DL (ref 0.7–1.2)
CREAT UR-MCNC: 139 MG/DL (ref 40–278)
GFR, ESTIMATED: 29 ML/MIN/1.73M2
GFR, ESTIMATED: 31 ML/MIN/1.73M2
GLUCOSE BLD-MCNC: 155 MG/DL (ref 74–99)
GLUCOSE BLD-MCNC: 166 MG/DL (ref 74–99)
GLUCOSE BLD-MCNC: 201 MG/DL (ref 74–99)
GLUCOSE SERPL-MCNC: 128 MG/DL (ref 74–99)
GLUCOSE SERPL-MCNC: 152 MG/DL (ref 74–99)
OSMOLALITY UR: 298 MOSM/KG (ref 300–900)
POTASSIUM SERPL-SCNC: 5.1 MMOL/L (ref 3.5–5.1)
POTASSIUM SERPL-SCNC: 5.3 MMOL/L (ref 3.5–5.1)
POTASSIUM, UR: 38.7 MMOL/L
PROT SERPL-MCNC: 7.2 G/DL (ref 6.4–8.3)
SODIUM SERPL-SCNC: 121 MMOL/L (ref 136–145)
SODIUM SERPL-SCNC: 122 MMOL/L (ref 136–145)
SODIUM UR-SCNC: <20 MMOL/L

## 2025-04-28 PROCEDURE — 6370000000 HC RX 637 (ALT 250 FOR IP): Performed by: INTERNAL MEDICINE

## 2025-04-28 PROCEDURE — 97161 PT EVAL LOW COMPLEX 20 MIN: CPT

## 2025-04-28 PROCEDURE — 36415 COLL VENOUS BLD VENIPUNCTURE: CPT

## 2025-04-28 PROCEDURE — 6360000002 HC RX W HCPCS

## 2025-04-28 PROCEDURE — 2580000003 HC RX 258

## 2025-04-28 PROCEDURE — 97530 THERAPEUTIC ACTIVITIES: CPT

## 2025-04-28 PROCEDURE — 82962 GLUCOSE BLOOD TEST: CPT

## 2025-04-28 PROCEDURE — 84133 ASSAY OF URINE POTASSIUM: CPT

## 2025-04-28 PROCEDURE — 80053 COMPREHEN METABOLIC PANEL: CPT

## 2025-04-28 PROCEDURE — 74018 RADEX ABDOMEN 1 VIEW: CPT

## 2025-04-28 PROCEDURE — 84300 ASSAY OF URINE SODIUM: CPT

## 2025-04-28 PROCEDURE — 97165 OT EVAL LOW COMPLEX 30 MIN: CPT

## 2025-04-28 PROCEDURE — 80048 BASIC METABOLIC PNL TOTAL CA: CPT

## 2025-04-28 PROCEDURE — 6370000000 HC RX 637 (ALT 250 FOR IP): Performed by: NURSE PRACTITIONER

## 2025-04-28 PROCEDURE — 2060000000 HC ICU INTERMEDIATE R&B

## 2025-04-28 PROCEDURE — 2500000003 HC RX 250 WO HCPCS: Performed by: INTERNAL MEDICINE

## 2025-04-28 PROCEDURE — 82570 ASSAY OF URINE CREATININE: CPT

## 2025-04-28 PROCEDURE — 83880 ASSAY OF NATRIURETIC PEPTIDE: CPT

## 2025-04-28 PROCEDURE — 99233 SBSQ HOSP IP/OBS HIGH 50: CPT | Performed by: INTERNAL MEDICINE

## 2025-04-28 PROCEDURE — 6360000002 HC RX W HCPCS: Performed by: INTERNAL MEDICINE

## 2025-04-28 PROCEDURE — 2700000000 HC OXYGEN THERAPY PER DAY

## 2025-04-28 PROCEDURE — 83935 ASSAY OF URINE OSMOLALITY: CPT

## 2025-04-28 PROCEDURE — 82248 BILIRUBIN DIRECT: CPT

## 2025-04-28 PROCEDURE — 6360000002 HC RX W HCPCS: Performed by: NURSE PRACTITIONER

## 2025-04-28 RX ORDER — DOBUTAMINE HYDROCHLORIDE 200 MG/100ML
3 INJECTION INTRAVENOUS CONTINUOUS
Status: DISCONTINUED | OUTPATIENT
Start: 2025-04-28 | End: 2025-04-28

## 2025-04-28 RX ORDER — DOXYCYCLINE 100 MG/1
100 CAPSULE ORAL EVERY 12 HOURS SCHEDULED
Status: COMPLETED | OUTPATIENT
Start: 2025-04-28 | End: 2025-05-07

## 2025-04-28 RX ORDER — DOBUTAMINE HYDROCHLORIDE 200 MG/100ML
1 INJECTION INTRAVENOUS CONTINUOUS
Status: DISPENSED | OUTPATIENT
Start: 2025-04-28 | End: 2025-04-29

## 2025-04-28 RX ADMIN — INSULIN GLARGINE 25 UNITS: 100 INJECTION, SOLUTION SUBCUTANEOUS at 08:56

## 2025-04-28 RX ADMIN — HYDROCODONE BITARTRATE AND ACETAMINOPHEN 1 TABLET: 5; 325 TABLET ORAL at 20:33

## 2025-04-28 RX ADMIN — FERROUS SULFATE TAB 325 MG (65 MG ELEMENTAL FE) 325 MG: 325 (65 FE) TAB at 08:56

## 2025-04-28 RX ADMIN — DOBUTAMINE IN DEXTROSE 5 MCG/KG/MIN: 200 INJECTION, SOLUTION INTRAVENOUS at 05:18

## 2025-04-28 RX ADMIN — INSULIN GLARGINE 25 UNITS: 100 INJECTION, SOLUTION SUBCUTANEOUS at 20:33

## 2025-04-28 RX ADMIN — LEVOTHYROXINE SODIUM 150 MCG: 0.1 TABLET ORAL at 05:21

## 2025-04-28 RX ADMIN — HYDROCODONE BITARTRATE AND ACETAMINOPHEN 1 TABLET: 5; 325 TABLET ORAL at 05:21

## 2025-04-28 RX ADMIN — BUMETANIDE 0.2 MG/HR: 0.25 INJECTION INTRAMUSCULAR; INTRAVENOUS at 13:57

## 2025-04-28 RX ADMIN — DOBUTAMINE HYDROCHLORIDE 3 MCG/KG/MIN: 200 INJECTION INTRAVENOUS at 08:53

## 2025-04-28 RX ADMIN — DOXYCYCLINE HYCLATE 100 MG: 100 CAPSULE ORAL at 20:33

## 2025-04-28 RX ADMIN — SODIUM CHLORIDE, PRESERVATIVE FREE 10 ML: 5 INJECTION INTRAVENOUS at 20:33

## 2025-04-28 RX ADMIN — ALLOPURINOL 200 MG: 100 TABLET ORAL at 08:56

## 2025-04-28 RX ADMIN — ASPIRIN 81 MG: 81 TABLET, COATED ORAL at 08:56

## 2025-04-28 RX ADMIN — SERTRALINE HYDROCHLORIDE 50 MG: 50 TABLET ORAL at 13:44

## 2025-04-28 RX ADMIN — DOXYCYCLINE HYCLATE 100 MG: 100 CAPSULE ORAL at 13:44

## 2025-04-28 RX ADMIN — Medication 2000 UNITS: at 08:56

## 2025-04-28 ASSESSMENT — PAIN DESCRIPTION - DESCRIPTORS
DESCRIPTORS: ACHING;CRAMPING;CRUSHING;DISCOMFORT
DESCRIPTORS: DISCOMFORT;NAGGING

## 2025-04-28 ASSESSMENT — PAIN DESCRIPTION - LOCATION
LOCATION: ABDOMEN
LOCATION: BACK

## 2025-04-28 ASSESSMENT — PAIN DESCRIPTION - ORIENTATION: ORIENTATION: LOWER

## 2025-04-28 ASSESSMENT — PAIN SCALES - GENERAL
PAINLEVEL_OUTOF10: 8
PAINLEVEL_OUTOF10: 10

## 2025-04-28 NOTE — PROGRESS NOTES
Occupational Therapy  OCCUPATIONAL THERAPY INITIAL EVALUATION    [facility]  [address], [place]      Date:2025                                                Patient Name: Wander Rocha  MRN: [id number]  : 1955  Room: [facility]    Evaluating OT: Jason Macedo OTR/L #[id number]     Referring Provider: Miracle Wood MD   Specific Provider Orders/Date: OT eval and treat 25    Diagnosis: Tachycardia-bradycardia (HCC) [I49.5]  S/P cardiac pacemaker procedure [Z95.0]  Heart failure with mid-range ejection fraction (HFmEF) (HCC) [I50.22]   Pt admitted to hospital for follow up and management of pacemaker    Surgery / Procedure: 25 Biventricular pacemaker upgrade     Pertinent Medical History:  has a past medical history of RADHA (acute kidney injury), Atrial fibrillation (HCC), Carpal tunnel syndrome, Colorectal polyps, Diverticula of colon, Encounter regarding vascular access for dialysis for ESRD (HCC), Hyperlipidemia, Hypertension, Hypothyroidism, Lung nodule, Lung nodules, Nocturnal hypoxemia due to obesity, Obesity, NIKO (obstructive sleep apnea), Osteoarthritis, Pacemaker, Pacemaker, Pinched nerve, Restrictive lung disease secondary to obesity, S/P laminectomy with spinal fusion, Sinus node dysfunction (HCC), Skin lesion of face, and Type II or unspecified type diabetes mellitus without mention of complication, not stated as uncontrolled.       Precautions:  Fall Risk, pacemaker precautions, O2, continuous pulse ox, + BALL    Assessment of current deficits    [x] Functional mobility  [x]ADLs  [x] Strength               []Cognition    [x] Functional transfers   [x] IADLs         [x] Safety Awareness   [x]Endurance    [] Fine Coordination              [x] Balance      [] Vision/perception   []Sensation     []Gross Motor Coordination  [] ROM  [] Delirium                   [] Motor Control     OT PLAN OF CARE   OT POC based on physician

## 2025-04-28 NOTE — PROGRESS NOTES
Physical Therapy  Physical Therapy Initial Assessment     Name: Wander Rocha  : 1955  MRN: [id number]      Date of Service: 2025    Evaluating PT:  Vik Guzman PT, DPT    Room #:  7412/7412-B  Diagnosis:  Tachycardia-bradycardia (HCC) [I49.5]  S/P cardiac pacemaker procedure [Z95.0]  Heart failure with mid-range ejection fraction (HFmEF) (HCC) [I50.22]  PMHx/PSHx:  afib, HTN, HLD, DM II, CKD, anemia  Procedure/Surgery:  s/p biventricular pacemaker upgrade   Precautions:  pacemaker, fall risk, O2, cognition  Equipment Owned: cane, ww, O2 (3L at night)  Equipment Needs:  TBD    SUBJECTIVE:    Pt lives with spouse in a 2 story home with 3 steps to enter and B handrail.  Bed/bath is on 2nd floor, pt sleeps in recliner on 1st floor.  Pt ambulated with no AD PTA.    OBJECTIVE:   Initial Evaluation  Date: 25 Treatment Short Term/ Long Term   Goals   AM-PAC 6 Clicks      Was pt agreeable to Eval/treatment? yes     Does pt have pain? 8/10 generalized     Bed Mobility  Rolling: NT  Supine to sit: NT  Sit to supine: NT  Scooting: SBA  Rolling: mod I  Supine to sit: mod I  Sit to supine: mod I  Scooting: mod I   Transfers Sit to stand: min A  Stand to sit: min A  Stand pivot: min A with HHA  Sit to stand: mod I  Stand to sit: mod I  Stand pivot: mod I with AAD   Ambulation    20'x4 with HHA min A  150'+ with AAD mod I   Stair negotiation: ascended and descended  NT  10 steps with 1 handrail mod I     Strength/ROM:   BLE grossly 4/5  BLE AROM WFL    Balance:   Static Sitting: SBA  Dynamic Sitting: SBA  Static Standing: CGA with HHA  Dynamic Standing: min A with HHA    Pt is A & O x 3  Sensation:  Pt denies numbness and tingling to extremities  Edema:  unremarkable    Vitals:  SpO2 dropped to 85% on 3L of O2 during functional mobility, recovered after ~2' seated rest break    Therapeutic Exercises:    Transfers: STS x1, cued for hand placement and postural correction  Ambulation: 20'x4 with HHA  BLE

## 2025-04-28 NOTE — DISCHARGE INSTR - COC
Continuity of Care Form    Patient Name: Wander Rocha   :  1955  MRN:  [id number]    Admit date:  2025  Discharge date:  2025    Code Status Order: Full Code   Advance Directives:     Admitting Physician:  Miracle Wood MD  PCP: Oly Crespo MD    Discharging Nurse: Kamila Hayes  Discharging Hospital Unit/Room#: 7412/7412-B  Discharging Unit Phone Number: [phone]    Emergency Contact:   Extended Emergency Contact Information  Primary Emergency Contact: Petra Rocha  Address: [address]  Home Phone: [phone]  Work Phone: [phone]  Mobile Phone: [phone]  Relation: Spouse  Secondary Emergency Contact: Rubina Kraus   [facility]  Home Phone: [phone]  Relation: Child    Past Surgical History:  Past Surgical History:   Procedure Laterality Date    BACK SURGERY      [facility]. Pinched nerve in back    BACK SURGERY  2017    BACK SURGERY N/A 2022    EXCISON OF SOFT TISSUE NEOPLASM OF UPPER BACK performed by Santana Maurer MD at [facility] OR    BACK SURGERY Left 2023    BACK LESION EXCISION SKIN GRAFT performed by Santana Maurer MD at [facility] OR    COLONOSCOPY      multiple colon polyps    COLONOSCOPY  2012    COLONOSCOPY    COLONOSCOPY  2022    polyp; diverticula--sarah    COLONOSCOPY N/A 2022    COLONOSCOPY POLYPECTOMY HOT BIOPSY (Snare) performed by Santana Maurer MD at [facility] ENDOSCOPY    EP DEVICE PROCEDURE N/A 2025    Venogram performed by Elsie Pereira MD at [facility] CARDIAC CATH LAB    EP DEVICE PROCEDURE N/A 2025    Biventricular pacemaker upgrade performed by Elsie Pereira MD at [facility] CARDIAC CATH LAB    EYE SURGERY      lennie cataracts implant    HERNIA REPAIR      umbilical    PACEMAKER PLACEMENT  10/03/2017    Medtronic dual chamber    Dr. Vizcarra    ROTATOR CUFF REPAIR Right        Immunization History:   Immunization History

## 2025-04-28 NOTE — PROGRESS NOTES
4 Eyes Skin Assessment     NAME:  Wander Rocha  YOB: 1955  MEDICAL RECORD NUMBER:  [id number]    The patient is being assessed for  Admission    I agree that at least one RN has performed a thorough Head to Toe Skin Assessment on the patient. ALL assessment sites listed below have been assessed.      Areas assessed by both nurses:    Head, Face, Ears, Shoulders, Back, Chest, Arms, Elbows, Hands, Sacrum. Buttock, Coccyx, Ischium, and Legs. Feet and Heels        Does the Patient have a Wound? No noted wound(s)       Aric Prevention initiated by RN: No  Wound Care Orders initiated by RN: No    Pressure Injury (Stage 3,4, Unstageable, DTI, NWPT, and Complex wounds) if present, place Wound referral order by RN under : No    New Ostomies, if present place, Ostomy referral order under : No     Nurse 1 eSignature: Electronically signed by Maryann Hidalgo RN on 4/28/25 at 6:09 PM EDT    **SHARE this note so that the co-signing nurse can place an eSignature**    Nurse 2 eSignature: {Esignature:[id number]}

## 2025-04-28 NOTE — PROGRESS NOTES
[facility]- [facility] [place]- Electrophysiology  Inpatient progress note  Wander Rocha  1955  Date of Service: 4/28/2025  PCP: Oly Crespo MD  Electrophysiologist: Elsie Pereira MD  Attending Electrophysiologist:Elsie Pereira MD          Subjective: 4/17/25:Wander Rocha is seen for follow-up and management of pacemaker in situ and persistent atrial fibrillation. Wander Rocha is a 69 y.o. male who has a history of HTN, HLD, insulin requiring T2DM with peripheral neuropathy, CKD, chronic anemia, pulmonary nodules, NIKO compliant with treatment, restrictive lung disease with qhs supplemental O2 use (PRN during the day), former tobacco smoker, hypothyroidism on HRT, questionable chronic HFpEF, NSVT, SND s/p dual-chamber Medtronic PPM placement 10/2017, persistent AF on chronic Eliquis therapy.  He was seen in office 08/15/2022  and was RV pacing 59.2% at that  time. He then followed up on 01/07/2025 and was RV pacing 94% A TTE was ordered and showed an EF of 45%. A follow up stress test showed low risk 02/24/2025. He is agreeable for CRT-P upgrade. 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 Risks, benefits, and alternatives of CRT-P upgrade were discussed in detail today. These risks include but are not limited to bleeding, infection, blood clot, pneumothorax, hemothorax,cardiac valve damage, cardiac perforation and tamponade required emergent thoracotomy, contrast induced nephropathy leading to short or even long term dialysis, vascular injury requiring emergent surgical repair, lead dislodgement, stroke and even death. The patient understands these risks and agrees to proceed with CRT-P upgrade.     4/24/25: Patient underwent upgrade of his pacemaker to a CRT-P device yesterday.  His initial chest x-ray shows evidence for pulmonary

## 2025-04-28 NOTE — PROGRESS NOTES
Department of Internal Medicine  Nephrology Progress Note    Events reviewed.    SUBJECTIVE: We are following Wander Rocha for RADHA. Patient reports no complaints.     PHYSICAL EXAM:      Vitals:    VITALS:  BP (!) 123/53   Pulse 61   Temp 97 °F (36.1 °C) (Temporal)   Resp 18   Ht 1.702 m (5' 7\")   Wt 103.9 kg (229 lb)   SpO2 95%   BMI 35.87 kg/m²   24HR INTAKE/OUTPUT:    Intake/Output Summary (Last 24 hours) at 4/28/2025 0845  Last data filed at 4/28/2025 0521  Gross per 24 hour   Intake 694.32 ml   Output 651 ml   Net 43.32 ml     Scheduled Meds:   doxycycline hyclate  100 mg Oral 2 times per day    [Held by provider] bumetanide  2 mg IntraVENous BID    vitamin D  2,000 Units Oral Daily    aspirin  81 mg Oral Daily    ferrous sulfate  325 mg Oral Every Other Day    insulin glargine  25 Units SubCUTAneous BID    atorvastatin  40 mg Oral Daily    [Held by provider] metoprolol succinate  25 mg Oral Daily    sertraline  50 mg Oral Daily    allopurinol  200 mg Oral Daily    [Held by provider] lisinopril  5 mg Oral Daily    sodium chloride flush  5-40 mL IntraVENous 2 times per day    levothyroxine  150 mcg Oral Daily     Continuous Infusions:   DOBUTamine 3 mcg/kg/min (04/28/25 0900)    bumetanide (BUMEX) 12.5 mg in sodium chloride 0.9 % 125 mL infusion      dextrose      sodium chloride       PRN Meds:.HYDROcodone 5 mg - acetaminophen, glucose, dextrose bolus **OR** dextrose bolus, glucagon (rDNA), dextrose, sodium chloride flush, sodium chloride, ondansetron, acetaminophen     Constitutional:  Awake, alert, oriented, in NAD  HEENT:  PERRLA, normocephalic, atraumatic  Respiratory: Diminished  Cardiovascular/Edema:  RRR, normal S1, normal S2  Gastrointestinal:  Soft, flat, non-distended, non-tender  Neurologic:  Nonfocal  Skin:  warm, dry, no rashes, no lesions    DATA:    CBC:   Lab Results   Component Value Date/Time    WBC 9.5 04/24/2025 05:08 AM    RBC 2.89 04/24/2025 05:08 AM    HGB 8.7 04/24/2025 05:08 AM     HCT 27.7 04/24/2025 05:08 AM    MCV 95.8 04/24/2025 05:08 AM    MCH 30.1 04/24/2025 05:08 AM    MCHC 31.4 04/24/2025 05:08 AM    RDW 16.9 04/24/2025 05:08 AM     04/24/2025 05:08 AM    MPV 9.7 04/24/2025 05:08 AM     CMP:    Lab Results   Component Value Date/Time     04/28/2025 05:44 AM    K 5.1 04/28/2025 05:44 AM    K 5.1 01/08/2023 05:35 AM    CL 90 04/28/2025 05:44 AM    CO2 17 04/28/2025 05:44 AM    BUN 59 04/28/2025 05:44 AM    CREATININE 2.2 04/28/2025 05:44 AM    GFRAA >60 10/03/2022 10:55 AM    LABGLOM 31 04/28/2025 05:44 AM    LABGLOM 60 04/19/2024 10:45 AM    GLUCOSE 128 04/28/2025 05:44 AM    GLUCOSE 133 04/18/2012 08:43 AM    CALCIUM 8.0 04/28/2025 05:44 AM    BILITOT 1.5 12/21/2023 04:59 PM    ALKPHOS 134 12/21/2023 04:59 PM    AST 26 12/21/2023 04:59 PM    ALT 23 12/21/2023 04:59 PM     Magnesium:    Lab Results   Component Value Date/Time    MG 2.1 04/24/2025 05:08 AM     Phosphorus:    Lab Results   Component Value Date/Time    PHOS 4.1 09/26/2024 09:35 AM     Radiology Review:    XR CHEST (2 VW) 4/25/25    IMPRESSION:  Cardiomegaly with mild interstitial edema and small left pleural effusion.      BRIEF SUMMARY OF INITIAL CONSULT:    Briefly, Wander Rocha is a 69-year-old male known to us, past medical history CKD stage 3a probably 2/2 nephrosclerosis and previous episode of ischemic ATN requiring temporary HD in May 2017, recurrent episode of ischemic ATN in March 2022 also requiring HD x2 with fair recovery of renal function, baseline creatinine 1.3 mg/dL, HTN, DM type II, A. fib on apixaban, HFpEF 45% %, NIKO, pacemaker placement, hypothyroidism, hyperlipidemia, was admitted on 4/23/2025 for pacemaker insertion.  Lab work revealed sodium 128, bicarb 18, creatinine up to 2.6 mg/dL, reason for this consultation significant medications include Bumex, metoprolol and lisinopril.  Chest x-ray shows cardiomegaly with mild interstitial edema and a small left pleural effusion and was given

## 2025-04-28 NOTE — PROGRESS NOTES
Physician Progress Note      PATIENT:               PAM HEADLEY  CSN #:                  [id number]  :                       1955  ADMIT DATE:       2025 8:25 AM  DISCH DATE:  RESPONDING  PROVIDER #:        Elsie Pereira MD          QUERY TEXT:    The attending physician is required to clarify conflicting documentation in   the medical record.  Noted documentation of Chronic HFmrEF per EP ,   -. HFpEF, Decompensated per Neprhology consult -. Chronic   Systolic per PMH.    The clinical indicators include:  - Chronic HFmrEF- Suspect secondary to frequent RV pacing given low risk   stress 25.- TTE 2/10/25 LVEF 45%. - TTE 22 LVEF 65%. - GDMT: Toprol   XL, Zestril and Bumex ( Dr. Wood)  - His wife does recall that he was on a diuretic twice a day about 2 weeks ago   but the dose was reduced to every other day recently.  His dyspnea on   exertion started at that time. ( Dr. Wood)  -RADHA stage I likely 2/2 hemodynamically mediated acute decompensated heart   failure (Nephrology,  Dr. Bass)  -Hypervolemia hyponatremia 2/2 acute decompensated heart failure (Nephrology,    Dr. Bass)  -HFpEF 45%, proBNP 2341, Bumex (Nephrology,  Dr. Bass)  - Chest x-ray shows cardiomegaly with mild interstitial edema and a small left   pleural effusion and was given IV Bumex. (Nephrology,  Dr. Bass)  -Chronic systolic (congestive) heart failure  05/15/2023 (PM)  -+1 Pitting Edema BLE (Nursing head to toe)  -IV Bumex, Po Bumex (MAR)  Options provided:  -- Acute on Chronic Systolic CHF  -- Acute on Chronic HFpEF  -- Acute on Chronic HFmrEF  -- Chronic Systolic CHF  -- Chronic HFpEF  -- Chronic HFmrEF  -- Other - I will add my own diagnosis  -- Disagree - Not applicable / Not valid  -- Disagree - Clinically unable to determine / Unknown  -- Refer to Clinical Documentation Reviewer    PROVIDER RESPONSE TEXT:    Acute on Chronic HFmrEF.    Query created by: Cheyenne Mays on

## 2025-04-28 NOTE — PLAN OF CARE
Problem: Chronic Conditions and Co-morbidities  Goal: Patient's chronic conditions and co-morbidity symptoms are monitored and maintained or improved  Outcome: Progressing     Problem: Discharge Planning  Goal: Discharge to home or other facility with appropriate resources  Outcome: Progressing     Problem: ABCDS Injury Assessment  Goal: Absence of physical injury  Outcome: Progressing     Problem: Pain  Goal: Verbalizes/displays adequate comfort level or baseline comfort level  Outcome: Progressing     Problem: Safety - Adult  Goal: Free from fall injury  Outcome: Progressing     Problem: Respiratory - Adult  Goal: Achieves optimal ventilation and oxygenation  Outcome: Progressing     Problem: Cardiovascular - Adult  Goal: Maintains optimal cardiac output and hemodynamic stability  Outcome: Progressing     Problem: Metabolic/Fluid and Electrolytes - Adult  Goal: Electrolytes maintained within normal limits  Outcome: Progressing  Goal: Hemodynamic stability and optimal renal function maintained  Outcome: Progressing  Goal: Glucose maintained within prescribed range  Outcome: Progressing     Problem: Musculoskeletal - Adult  Goal: Return mobility to safest level of function  Outcome: Progressing  Goal: Maintain proper alignment of affected body part  Outcome: Progressing  Goal: Return ADL status to a safe level of function  Outcome: Progressing     Problem: Gastrointestinal - Adult  Goal: Minimal or absence of nausea and vomiting  Outcome: Progressing  Goal: Maintains or returns to baseline bowel function  Outcome: Progressing  Goal: Maintains adequate nutritional intake  Outcome: Progressing  Goal: Establish and maintain optimal ostomy function  Outcome: Progressing

## 2025-04-28 NOTE — PROGRESS NOTES
[facility] - [facility] Internal Medicine      SUBJECTIVE:  Wander Rocha (:  1955) is a 69 y.o. male here for evaluation of the following chief complaint(s):  No chief complaint on file.      Previous Visit Imp Points: ***    HPI:   Afib on Eliquis  No falls, easy bruising, no blood in urine or stool.      Recurrent epistaxis, L nare   Multiple times weekly, bleeds ~3 hours at a time. Happens more frequently during winter but still occurs weekly during summer, has been using saline spray and petroleum jelly, air humidifier, without relief. On exam there is a prominent vessel on L septum with scab present. Referral to ENT for possible intervention. Check CBC.       Sinus node dysfunction s/p DCPM    HFpEF   Follows with CHF clinic once weekly to once every 2 weeks  Cardiologist Dr. Ruggiero  Bumex 2mg daily - no edema on exam.  Discussed possibility of adding ACE/ARB, discontinuing bumex, and adding jardiance previous appointment however pt wanted to discuss with other providers. Will discuss with patient and his wife via telephone this afternoon.   No CP, SOB, cough, palpitations.      Hypothyroidism   Is feeling more fatigued and tired. No constipation.   Synthroid 125. Previous TSH 11.72 2024 however unable was unable to get a hold of pt via phone and message went un-returned. [place] TSH in clinic today with plans to discuss with pt and wife this afternoon when she is home from work.       HTN  Controlled on Toprol 25   No HA, blurred vision, CP.      HLD   Lipitor 40  Lipid panel 2/15/2024. LDL 68, HDL 22, triglycerides 171.      CKD   Dr. Bass 1 time per year.   5/15/2024 Cr 1.2, EGFR 68.      DMT T2, long-term insulin use   A1c 8.3% (7.2%).   Basaglar 25U BID - not taking PM dose. Encouraged compliance especially with increasing A1c. Pt states he has been eating sweet prior to bed.   BG range from 160-200s, admits it has been due to diet noncompliance.   No hypoglycemia

## 2025-04-28 NOTE — CARE COORDINATION
Transition of Care Update:    POD #5 Upgrade of Medtronic Dual Chamber Pacemaker to Bi-V pacemaker.  Dobutrex gtt & Bumex IV BID per EP.  Creatinine 2.2 & Sodium 121 today.  Nephrology following.  On 3.5L O2, wears No Home O2.  If unable to wean, will Pulse Oximetry Assessment & O2 script sent to DME of choice.  Met with pt in room, re-introduced role of CM & discussed discharge plans.  Pt agreeable to [facility] for Nursing, no HHC preference.  Referral made to [facility] for Nursing, pending.  Will need a [facility] Order at discharge.  Pt's preferred discharge plan is Home with HH.  Wife to transport.  CM/SW to follow.          The Plan for Transition of Care is related to the following treatment goals: Home with C    The Patient was provided with a choice of provider and agrees   with the discharge plan. [x] Yes [] No    Freedom of choice list was provided with basic dialogue that supports the patient's individualized plan of care/goals, treatment preferences and shares the quality data associated with the providers. [x] Yes [] No       Pulse Oximetry Assessment   ___% at rest on room air   ___% while ambulating on room air  ___% at rest on ___ LPM  ___% while ambulating on ___ LPM

## 2025-04-29 ENCOUNTER — APPOINTMENT (OUTPATIENT)
Dept: GENERAL RADIOLOGY | Age: 70
DRG: 242 | End: 2025-04-29
Attending: INTERNAL MEDICINE
Payer: MEDICARE

## 2025-04-29 ENCOUNTER — APPOINTMENT (OUTPATIENT)
Age: 70
DRG: 242 | End: 2025-04-29
Attending: INTERNAL MEDICINE
Payer: MEDICARE

## 2025-04-29 LAB
ANION GAP SERPL CALCULATED.3IONS-SCNC: 14 MMOL/L (ref 7–16)
ANION GAP SERPL CALCULATED.3IONS-SCNC: 15 MMOL/L (ref 7–16)
B.E.: -8.8 MMOL/L (ref -3–3)
BUN SERPL-MCNC: 63 MG/DL (ref 8–23)
BUN SERPL-MCNC: 64 MG/DL (ref 8–23)
CALCIUM SERPL-MCNC: 8.1 MG/DL (ref 8.8–10.2)
CALCIUM SERPL-MCNC: 8.3 MG/DL (ref 8.8–10.2)
CHLORIDE SERPL-SCNC: 92 MMOL/L (ref 98–107)
CHLORIDE SERPL-SCNC: 92 MMOL/L (ref 98–107)
CO2 SERPL-SCNC: 16 MMOL/L (ref 22–29)
CO2 SERPL-SCNC: 17 MMOL/L (ref 22–29)
COHB: 1 % (ref 0–1.5)
CREAT SERPL-MCNC: 2.4 MG/DL (ref 0.7–1.2)
CREAT SERPL-MCNC: 2.4 MG/DL (ref 0.7–1.2)
CRITICAL: ABNORMAL
DATE ANALYZED: ABNORMAL
DATE OF COLLECTION: ABNORMAL
ECHO BSA: 2.12 M2
ECHO EST RA PRESSURE: 3 MMHG
ECHO LA VOL A-L A2C: 97 ML (ref 18–58)
ECHO LA VOL A-L A4C: 80 ML (ref 18–58)
ECHO LA VOL BP: 88 ML (ref 18–58)
ECHO LA VOL MOD A2C: 92 ML (ref 18–58)
ECHO LA VOL MOD A4C: 75 ML (ref 18–58)
ECHO LA VOL/BSA BIPLANE: 41 ML/M2 (ref 16–34)
ECHO LA VOLUME AREA LENGTH: 94 ML
ECHO LA VOLUME INDEX A-L A2C: 45 ML/M2 (ref 16–34)
ECHO LA VOLUME INDEX A-L A4C: 37 ML/M2 (ref 16–34)
ECHO LA VOLUME INDEX AREA LENGTH: 44 ML/M2 (ref 16–34)
ECHO LA VOLUME INDEX MOD A2C: 43 ML/M2 (ref 16–34)
ECHO LA VOLUME INDEX MOD A4C: 35 ML/M2 (ref 16–34)
ECHO LV EF PHYSICIAN: 55 %
ECHO MV A VELOCITY: 0.29 M/S
ECHO MV E DECELERATION TIME (DT): 183.1 MS
ECHO MV E VELOCITY: 1.2 M/S
ECHO MV E/A RATIO: 4.14
ECHO RIGHT VENTRICULAR SYSTOLIC PRESSURE (RVSP): 47 MMHG
ECHO RV TAPSE: 2 CM (ref 1.7–?)
ECHO TV REGURGITANT MAX VELOCITY: 3.33 M/S
ECHO TV REGURGITANT PEAK GRADIENT: 44 MMHG
GFR, ESTIMATED: 28 ML/MIN/1.73M2
GFR, ESTIMATED: 28 ML/MIN/1.73M2
GLUCOSE BLD-MCNC: 113 MG/DL (ref 74–99)
GLUCOSE BLD-MCNC: 117 MG/DL (ref 74–99)
GLUCOSE BLD-MCNC: 186 MG/DL (ref 74–99)
GLUCOSE BLD-MCNC: 96 MG/DL (ref 74–99)
GLUCOSE SERPL-MCNC: 100 MG/DL (ref 74–99)
GLUCOSE SERPL-MCNC: 141 MG/DL (ref 74–99)
HCO3: 16.3 MMOL/L (ref 22–26)
HHB: 6.3 % (ref 0–5)
LAB: ABNORMAL
LEFT VENTRICULAR EJECTION FRACTION HIGH VALUE: 55 %
LEFT VENTRICULAR EJECTION FRACTION MODE: NORMAL
LV EF: 50 %
Lab: 1309
METHB: 0.4 % (ref 0–1.5)
MODE: ABNORMAL
O2 SATURATION: 93.6 % (ref 92–98.5)
O2HB: 92.3 % (ref 94–97)
OPERATOR ID: 8217
PATIENT TEMP: 37 C
PCO2: 32.4 MMHG (ref 35–45)
PH BLOOD GAS: 7.32 (ref 7.35–7.45)
PO2: 76.6 MMHG (ref 75–100)
POTASSIUM SERPL-SCNC: 4.9 MMOL/L (ref 3.5–5.1)
POTASSIUM SERPL-SCNC: 5.4 MMOL/L (ref 3.5–5.1)
SODIUM SERPL-SCNC: 122 MMOL/L (ref 136–145)
SODIUM SERPL-SCNC: 123 MMOL/L (ref 136–145)
SOURCE, BLOOD GAS: ABNORMAL
THB: 10.2 G/DL (ref 11.5–16.5)
TIME ANALYZED: 1320

## 2025-04-29 PROCEDURE — 2500000003 HC RX 250 WO HCPCS: Performed by: INTERNAL MEDICINE

## 2025-04-29 PROCEDURE — 93325 DOPPLER ECHO COLOR FLOW MAPG: CPT | Performed by: INTERNAL MEDICINE

## 2025-04-29 PROCEDURE — 82962 GLUCOSE BLOOD TEST: CPT

## 2025-04-29 PROCEDURE — 2700000000 HC OXYGEN THERAPY PER DAY

## 2025-04-29 PROCEDURE — 36600 WITHDRAWAL OF ARTERIAL BLOOD: CPT

## 2025-04-29 PROCEDURE — 82805 BLOOD GASES W/O2 SATURATION: CPT

## 2025-04-29 PROCEDURE — 6370000000 HC RX 637 (ALT 250 FOR IP): Performed by: INTERNAL MEDICINE

## 2025-04-29 PROCEDURE — 2580000003 HC RX 258

## 2025-04-29 PROCEDURE — 71046 X-RAY EXAM CHEST 2 VIEWS: CPT

## 2025-04-29 PROCEDURE — 93321 DOPPLER ECHO F-UP/LMTD STD: CPT | Performed by: INTERNAL MEDICINE

## 2025-04-29 PROCEDURE — 2580000003 HC RX 258: Performed by: INTERNAL MEDICINE

## 2025-04-29 PROCEDURE — 51798 US URINE CAPACITY MEASURE: CPT

## 2025-04-29 PROCEDURE — 6360000002 HC RX W HCPCS

## 2025-04-29 PROCEDURE — 36415 COLL VENOUS BLD VENIPUNCTURE: CPT

## 2025-04-29 PROCEDURE — 99233 SBSQ HOSP IP/OBS HIGH 50: CPT | Performed by: INTERNAL MEDICINE

## 2025-04-29 PROCEDURE — 93308 TTE F-UP OR LMTD: CPT

## 2025-04-29 PROCEDURE — 2060000000 HC ICU INTERMEDIATE R&B

## 2025-04-29 PROCEDURE — 6370000000 HC RX 637 (ALT 250 FOR IP): Performed by: NURSE PRACTITIONER

## 2025-04-29 PROCEDURE — 93308 TTE F-UP OR LMTD: CPT | Performed by: INTERNAL MEDICINE

## 2025-04-29 PROCEDURE — 80048 BASIC METABOLIC PNL TOTAL CA: CPT

## 2025-04-29 RX ADMIN — SERTRALINE HYDROCHLORIDE 50 MG: 50 TABLET ORAL at 09:45

## 2025-04-29 RX ADMIN — HYDROCODONE BITARTRATE AND ACETAMINOPHEN 1 TABLET: 5; 325 TABLET ORAL at 20:00

## 2025-04-29 RX ADMIN — INSULIN GLARGINE 25 UNITS: 100 INJECTION, SOLUTION SUBCUTANEOUS at 20:01

## 2025-04-29 RX ADMIN — Medication 2000 UNITS: at 09:46

## 2025-04-29 RX ADMIN — SODIUM BICARBONATE: 84 INJECTION, SOLUTION INTRAVENOUS at 23:11

## 2025-04-29 RX ADMIN — SODIUM CHLORIDE, PRESERVATIVE FREE 10 ML: 5 INJECTION INTRAVENOUS at 20:02

## 2025-04-29 RX ADMIN — BUMETANIDE 0.5 MG/HR: 0.25 INJECTION INTRAMUSCULAR; INTRAVENOUS at 17:16

## 2025-04-29 RX ADMIN — LEVOTHYROXINE SODIUM 150 MCG: 0.1 TABLET ORAL at 06:11

## 2025-04-29 RX ADMIN — HYDROCODONE BITARTRATE AND ACETAMINOPHEN 1 TABLET: 5; 325 TABLET ORAL at 11:49

## 2025-04-29 RX ADMIN — ALLOPURINOL 200 MG: 100 TABLET ORAL at 09:45

## 2025-04-29 RX ADMIN — DOXYCYCLINE HYCLATE 100 MG: 100 CAPSULE ORAL at 09:45

## 2025-04-29 RX ADMIN — ASPIRIN 81 MG: 81 TABLET, COATED ORAL at 09:45

## 2025-04-29 RX ADMIN — SODIUM CHLORIDE, PRESERVATIVE FREE 10 ML: 5 INJECTION INTRAVENOUS at 09:46

## 2025-04-29 RX ADMIN — ATORVASTATIN CALCIUM 40 MG: 40 TABLET, FILM COATED ORAL at 09:45

## 2025-04-29 RX ADMIN — DOXYCYCLINE HYCLATE 100 MG: 100 CAPSULE ORAL at 20:01

## 2025-04-29 RX ADMIN — INSULIN GLARGINE 25 UNITS: 100 INJECTION, SOLUTION SUBCUTANEOUS at 09:43

## 2025-04-29 ASSESSMENT — PAIN DESCRIPTION - ORIENTATION: ORIENTATION: LOWER

## 2025-04-29 ASSESSMENT — PAIN SCALES - GENERAL
PAINLEVEL_OUTOF10: 8
PAINLEVEL_OUTOF10: 0
PAINLEVEL_OUTOF10: 8
PAINLEVEL_OUTOF10: 0
PAINLEVEL_OUTOF10: 0
PAINLEVEL_OUTOF10: 5

## 2025-04-29 ASSESSMENT — PAIN DESCRIPTION - ONSET: ONSET: ON-GOING

## 2025-04-29 ASSESSMENT — PAIN DESCRIPTION - DESCRIPTORS
DESCRIPTORS: SHARP
DESCRIPTORS: ACHING;DISCOMFORT;SORE

## 2025-04-29 ASSESSMENT — PAIN DESCRIPTION - LOCATION
LOCATION: BACK
LOCATION: BACK

## 2025-04-29 ASSESSMENT — PAIN DESCRIPTION - FREQUENCY: FREQUENCY: INTERMITTENT

## 2025-04-29 ASSESSMENT — PAIN - FUNCTIONAL ASSESSMENT: PAIN_FUNCTIONAL_ASSESSMENT: PREVENTS OR INTERFERES WITH MANY ACTIVE NOT PASSIVE ACTIVITIES

## 2025-04-29 ASSESSMENT — PAIN DESCRIPTION - PAIN TYPE: TYPE: CHRONIC PAIN

## 2025-04-29 NOTE — CARE COORDINATION
4/29/25 Update CM Pt admitted 4/25/25 s/p pacer upgrade.  Renal following for RADHA.  On Dobutrex nd Bumex gtts. O2 3L/NC which is baseline for pt (does not know name of provider.) DC plan to return home with [facility]. Wife to provide transportation  Yomaira CABELLO RN-BC  [phone]

## 2025-04-29 NOTE — PROGRESS NOTES
Department of Internal Medicine  Nephrology Progress Note    Events reviewed.    SUBJECTIVE: We are following Wander Rocha for RADHA. Patient reports no complaints.     PHYSICAL EXAM:      Vitals:    VITALS:  BP (!) 134/59   Pulse 61   Temp 97.7 °F (36.5 °C) (Oral)   Resp 17   Ht 1.702 m (5' 7\")   Wt 103.9 kg (229 lb)   SpO2 95%   BMI 35.87 kg/m²   24HR INTAKE/OUTPUT:    Intake/Output Summary (Last 24 hours) at 4/29/2025 1223  Last data filed at 4/29/2025 0815  Gross per 24 hour   Intake 485.96 ml   Output 320 ml   Net 165.96 ml     Scheduled Meds:   doxycycline hyclate  100 mg Oral 2 times per day    [Held by provider] bumetanide  2 mg IntraVENous BID    vitamin D  2,000 Units Oral Daily    aspirin  81 mg Oral Daily    ferrous sulfate  325 mg Oral Every Other Day    insulin glargine  25 Units SubCUTAneous BID    atorvastatin  40 mg Oral Daily    [Held by provider] metoprolol succinate  25 mg Oral Daily    sertraline  50 mg Oral Daily    allopurinol  200 mg Oral Daily    [Held by provider] lisinopril  5 mg Oral Daily    sodium chloride flush  5-40 mL IntraVENous 2 times per day    levothyroxine  150 mcg Oral Daily     Continuous Infusions:   bumetanide (BUMEX) 12.5 mg in sodium chloride 0.9 % 125 mL infusion 0.2 mg/hr (04/29/25 0626)    dextrose      sodium chloride       PRN Meds:.HYDROcodone 5 mg - acetaminophen, glucose, dextrose bolus **OR** dextrose bolus, glucagon (rDNA), dextrose, sodium chloride flush, sodium chloride, ondansetron, acetaminophen     Constitutional:  Awake, alert, oriented, in NAD  HEENT:  PERRLA, normocephalic, atraumatic  Respiratory: Diminished  Cardiovascular/Edema:  RRR, normal S1, normal S2  Gastrointestinal:  Soft, flat, non-distended, non-tender  Neurologic:  Nonfocal  Skin:  warm, dry, no rashes, no lesions    DATA:    CBC:   Lab Results   Component Value Date/Time    WBC 9.5 04/24/2025 05:08 AM    RBC 2.89 04/24/2025 05:08 AM    HGB 8.7 04/24/2025 05:08 AM    HCT 27.7

## 2025-04-29 NOTE — PLAN OF CARE
Problem: Chronic Conditions and Co-morbidities  Goal: Patient's chronic conditions and co-morbidity symptoms are monitored and maintained or improved  4/29/2025 1216 by Maryann Hidalgo RN  Outcome: Progressing  4/28/2025 2254 by Theresa Herrera RN  Outcome: Progressing     Problem: Discharge Planning  Goal: Discharge to home or other facility with appropriate resources  4/29/2025 1216 by Maryann Hidalgo RN  Outcome: Progressing  4/28/2025 2254 by Theresa Herrera RN  Outcome: Progressing     Problem: ABCDS Injury Assessment  Goal: Absence of physical injury  4/29/2025 1216 by Maryann Hidalgo RN  Outcome: Progressing  4/28/2025 2254 by Theresa Herrera RN  Outcome: Progressing     Problem: Pain  Goal: Verbalizes/displays adequate comfort level or baseline comfort level  4/29/2025 1216 by Maryann Hidalgo RN  Outcome: Progressing  4/28/2025 2254 by Theresa Herrera RN  Outcome: Progressing     Problem: Safety - Adult  Goal: Free from fall injury  4/29/2025 1216 by Maryann Hidalgo RN  Outcome: Progressing  4/28/2025 2254 by Theresa Herrera RN  Outcome: Progressing     Problem: Respiratory - Adult  Goal: Achieves optimal ventilation and oxygenation  4/29/2025 1216 by Maryann Hidalgo RN  Outcome: Progressing  4/28/2025 2254 by Theresa Herrera RN  Outcome: Progressing  Flowsheets (Taken 4/28/2025 2000)  Achieves optimal ventilation and oxygenation: Assess for changes in respiratory status     Problem: Cardiovascular - Adult  Goal: Maintains optimal cardiac output and hemodynamic stability  4/29/2025 1216 by Maryann Hidalgo RN  Outcome: Progressing  4/28/2025 2254 by Theresa Herrera RN  Outcome: Progressing     Problem: Metabolic/Fluid and Electrolytes - Adult  Goal: Electrolytes maintained within normal limits  4/29/2025 1216 by Maryann Hidalgo RN  Outcome: Progressing  4/28/2025 2254 by Theresa Herrera RN  Outcome: Progressing  Goal: Hemodynamic stability and optimal renal function

## 2025-04-29 NOTE — PROGRESS NOTES
[facility]- [facility] [place]- Electrophysiology  Inpatient progress note  Wander Rocha  1955  Date of Service: 4/29/2025  PCP: Oly Crespo MD  Electrophysiologist: Elsie Pereira MD  Attending Electrophysiologist:Elise Pereira MD          Subjective: 4/17/25:Wander Rocha is seen for follow-up and management of pacemaker in situ and persistent atrial fibrillation. Wander Rocha is a 69 y.o. male who has a history of HTN, HLD, insulin requiring T2DM with peripheral neuropathy, CKD, chronic anemia, pulmonary nodules, NIKO compliant with treatment, restrictive lung disease with qhs supplemental O2 use (PRN during the day), former tobacco smoker, hypothyroidism on HRT, questionable chronic HFpEF, NSVT, SND s/p dual-chamber Medtronic PPM placement 10/2017, persistent AF on chronic Eliquis therapy.  He was seen in office 08/15/2022  and was RV pacing 59.2% at that  time. He then followed up on 01/07/2025 and was RV pacing 94% A TTE was ordered and showed an EF of 45%. A follow up stress test showed low risk 02/24/2025. He is agreeable for CRT-P upgrade. 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 Risks, benefits, and alternatives of CRT-P upgrade were discussed in detail today. These risks include but are not limited to bleeding, infection, blood clot, pneumothorax, hemothorax,cardiac valve damage, cardiac perforation and tamponade required emergent thoracotomy, contrast induced nephropathy leading to short or even long term dialysis, vascular injury requiring emergent surgical repair, lead dislodgement, stroke and even death. The patient understands these risks and agrees to proceed with CRT-P upgrade.     4/24/25: Patient underwent upgrade of his pacemaker to a CRT-P device yesterday.  His initial chest x-ray shows evidence for pulmonary

## 2025-04-29 NOTE — PLAN OF CARE
Problem: Chronic Conditions and Co-morbidities  Goal: Patient's chronic conditions and co-morbidity symptoms are monitored and maintained or improved  4/28/2025 2254 by Theresa Herrera RN  Outcome: Progressing  4/28/2025 1630 by Maryann Hidalgo RN  Outcome: Progressing     Problem: Discharge Planning  Goal: Discharge to home or other facility with appropriate resources  4/28/2025 2254 by Theresa Herrera RN  Outcome: Progressing  4/28/2025 1630 by Maryann Hidalgo RN  Outcome: Progressing     Problem: ABCDS Injury Assessment  Goal: Absence of physical injury  4/28/2025 2254 by Theresa Herrera RN  Outcome: Progressing  4/28/2025 1630 by Maryann Hidalgo RN  Outcome: Progressing     Problem: Pain  Goal: Verbalizes/displays adequate comfort level or baseline comfort level  4/28/2025 2254 by Theresa Herrera RN  Outcome: Progressing  4/28/2025 1630 by Maryann Hidalgo RN  Outcome: Progressing     Problem: Safety - Adult  Goal: Free from fall injury  4/28/2025 2254 by Theresa Herrera RN  Outcome: Progressing  4/28/2025 1630 by Maryann Hidalgo RN  Outcome: Progressing     Problem: Respiratory - Adult  Goal: Achieves optimal ventilation and oxygenation  4/28/2025 2254 by Theresa Herrera RN  Outcome: Progressing  Flowsheets (Taken 4/28/2025 2000)  Achieves optimal ventilation and oxygenation: Assess for changes in respiratory status  4/28/2025 1630 by Maryann Hidalgo RN  Outcome: Progressing     Problem: Cardiovascular - Adult  Goal: Maintains optimal cardiac output and hemodynamic stability  4/28/2025 2254 by Theresa Herrera RN  Outcome: Progressing  4/28/2025 1630 by Maryann Hidalgo RN  Outcome: Progressing     Problem: Metabolic/Fluid and Electrolytes - Adult  Goal: Electrolytes maintained within normal limits  4/28/2025 2254 by Theresa Herrera RN  Outcome: Progressing  4/28/2025 1630 by Maryann Hidalgo RN  Outcome: Progressing  Goal: Hemodynamic stability and optimal renal function

## 2025-04-30 LAB
ANION GAP SERPL CALCULATED.3IONS-SCNC: 13 MMOL/L (ref 7–16)
ANION GAP SERPL CALCULATED.3IONS-SCNC: 14 MMOL/L (ref 7–16)
ANION GAP SERPL CALCULATED.3IONS-SCNC: 16 MMOL/L (ref 7–16)
BNP SERPL-MCNC: 4325 PG/ML (ref 0–125)
BUN SERPL-MCNC: 64 MG/DL (ref 8–23)
BUN SERPL-MCNC: 64 MG/DL (ref 8–23)
BUN SERPL-MCNC: 65 MG/DL (ref 8–23)
CALCIUM SERPL-MCNC: 7.9 MG/DL (ref 8.8–10.2)
CALCIUM SERPL-MCNC: 8.2 MG/DL (ref 8.8–10.2)
CALCIUM SERPL-MCNC: 8.3 MG/DL (ref 8.8–10.2)
CHLORIDE SERPL-SCNC: 91 MMOL/L (ref 98–107)
CHLORIDE SERPL-SCNC: 92 MMOL/L (ref 98–107)
CHLORIDE SERPL-SCNC: 93 MMOL/L (ref 98–107)
CO2 SERPL-SCNC: 19 MMOL/L (ref 22–29)
CO2 SERPL-SCNC: 21 MMOL/L (ref 22–29)
CO2 SERPL-SCNC: 21 MMOL/L (ref 22–29)
CREAT SERPL-MCNC: 2 MG/DL (ref 0.7–1.2)
CREAT SERPL-MCNC: 2.1 MG/DL (ref 0.7–1.2)
CREAT SERPL-MCNC: 2.1 MG/DL (ref 0.7–1.2)
GFR, ESTIMATED: 32 ML/MIN/1.73M2
GFR, ESTIMATED: 34 ML/MIN/1.73M2
GFR, ESTIMATED: 35 ML/MIN/1.73M2
GLUCOSE BLD-MCNC: 104 MG/DL (ref 74–99)
GLUCOSE BLD-MCNC: 141 MG/DL (ref 74–99)
GLUCOSE BLD-MCNC: 180 MG/DL (ref 74–99)
GLUCOSE BLD-MCNC: 70 MG/DL (ref 74–99)
GLUCOSE SERPL-MCNC: 131 MG/DL (ref 74–99)
GLUCOSE SERPL-MCNC: 136 MG/DL (ref 74–99)
GLUCOSE SERPL-MCNC: 72 MG/DL (ref 74–99)
POTASSIUM SERPL-SCNC: 4.5 MMOL/L (ref 3.5–5.1)
POTASSIUM SERPL-SCNC: 4.5 MMOL/L (ref 3.5–5.1)
POTASSIUM SERPL-SCNC: 4.6 MMOL/L (ref 3.5–5.1)
SODIUM SERPL-SCNC: 126 MMOL/L (ref 136–145)
T4 FREE SERPL-MCNC: 1.4 NG/DL (ref 0.9–1.7)
TSH SERPL DL<=0.05 MIU/L-ACNC: 11.6 UIU/ML (ref 0.27–4.2)

## 2025-04-30 PROCEDURE — 84439 ASSAY OF FREE THYROXINE: CPT

## 2025-04-30 PROCEDURE — 2500000003 HC RX 250 WO HCPCS: Performed by: INTERNAL MEDICINE

## 2025-04-30 PROCEDURE — 2580000003 HC RX 258: Performed by: INTERNAL MEDICINE

## 2025-04-30 PROCEDURE — 2700000000 HC OXYGEN THERAPY PER DAY

## 2025-04-30 PROCEDURE — 6370000000 HC RX 637 (ALT 250 FOR IP): Performed by: STUDENT IN AN ORGANIZED HEALTH CARE EDUCATION/TRAINING PROGRAM

## 2025-04-30 PROCEDURE — 82962 GLUCOSE BLOOD TEST: CPT

## 2025-04-30 PROCEDURE — 36415 COLL VENOUS BLD VENIPUNCTURE: CPT

## 2025-04-30 PROCEDURE — 83880 ASSAY OF NATRIURETIC PEPTIDE: CPT

## 2025-04-30 PROCEDURE — 6370000000 HC RX 637 (ALT 250 FOR IP): Performed by: INTERNAL MEDICINE

## 2025-04-30 PROCEDURE — 99233 SBSQ HOSP IP/OBS HIGH 50: CPT | Performed by: INTERNAL MEDICINE

## 2025-04-30 PROCEDURE — 6360000002 HC RX W HCPCS: Performed by: STUDENT IN AN ORGANIZED HEALTH CARE EDUCATION/TRAINING PROGRAM

## 2025-04-30 PROCEDURE — 84443 ASSAY THYROID STIM HORMONE: CPT

## 2025-04-30 PROCEDURE — 6370000000 HC RX 637 (ALT 250 FOR IP): Performed by: NURSE PRACTITIONER

## 2025-04-30 PROCEDURE — 80048 BASIC METABOLIC PNL TOTAL CA: CPT

## 2025-04-30 PROCEDURE — 2060000000 HC ICU INTERMEDIATE R&B

## 2025-04-30 PROCEDURE — 51798 US URINE CAPACITY MEASURE: CPT

## 2025-04-30 RX ORDER — POLYETHYLENE GLYCOL 3350 17 G/17G
17 POWDER, FOR SOLUTION ORAL DAILY
Status: DISCONTINUED | OUTPATIENT
Start: 2025-04-30 | End: 2025-05-09 | Stop reason: HOSPADM

## 2025-04-30 RX ORDER — SENNOSIDES A AND B 8.6 MG/1
1 TABLET, FILM COATED ORAL NIGHTLY
Status: DISCONTINUED | OUTPATIENT
Start: 2025-04-30 | End: 2025-05-07

## 2025-04-30 RX ORDER — INSULIN GLARGINE 100 [IU]/ML
22 INJECTION, SOLUTION SUBCUTANEOUS 2 TIMES DAILY
Status: DISCONTINUED | OUTPATIENT
Start: 2025-04-30 | End: 2025-04-30

## 2025-04-30 RX ORDER — INSULIN GLARGINE 100 [IU]/ML
18 INJECTION, SOLUTION SUBCUTANEOUS 2 TIMES DAILY
Status: DISCONTINUED | OUTPATIENT
Start: 2025-04-30 | End: 2025-05-01

## 2025-04-30 RX ORDER — HEPARIN SODIUM 5000 [USP'U]/ML
5000 INJECTION, SOLUTION INTRAVENOUS; SUBCUTANEOUS EVERY 8 HOURS SCHEDULED
Status: DISCONTINUED | OUTPATIENT
Start: 2025-04-30 | End: 2025-05-02

## 2025-04-30 RX ORDER — INSULIN LISPRO 100 [IU]/ML
0-4 INJECTION, SOLUTION INTRAVENOUS; SUBCUTANEOUS
Status: DISCONTINUED | OUTPATIENT
Start: 2025-04-30 | End: 2025-05-09 | Stop reason: HOSPADM

## 2025-04-30 RX ADMIN — ALLOPURINOL 200 MG: 100 TABLET ORAL at 08:44

## 2025-04-30 RX ADMIN — INSULIN GLARGINE 18 UNITS: 100 INJECTION, SOLUTION SUBCUTANEOUS at 20:55

## 2025-04-30 RX ADMIN — DOXYCYCLINE HYCLATE 100 MG: 100 CAPSULE ORAL at 08:44

## 2025-04-30 RX ADMIN — SENNOSIDES 8.6 MG: 8.6 TABLET, COATED ORAL at 20:55

## 2025-04-30 RX ADMIN — Medication 2000 UNITS: at 08:44

## 2025-04-30 RX ADMIN — SODIUM CHLORIDE, PRESERVATIVE FREE 10 ML: 5 INJECTION INTRAVENOUS at 20:56

## 2025-04-30 RX ADMIN — DOXYCYCLINE HYCLATE 100 MG: 100 CAPSULE ORAL at 20:55

## 2025-04-30 RX ADMIN — SODIUM CHLORIDE, PRESERVATIVE FREE 10 ML: 5 INJECTION INTRAVENOUS at 08:44

## 2025-04-30 RX ADMIN — HYDROCODONE BITARTRATE AND ACETAMINOPHEN 1 TABLET: 5; 325 TABLET ORAL at 23:06

## 2025-04-30 RX ADMIN — ATORVASTATIN CALCIUM 40 MG: 40 TABLET, FILM COATED ORAL at 08:43

## 2025-04-30 RX ADMIN — POLYETHYLENE GLYCOL 3350 17 G: 17 POWDER, FOR SOLUTION ORAL at 15:12

## 2025-04-30 RX ADMIN — FERROUS SULFATE TAB 325 MG (65 MG ELEMENTAL FE) 325 MG: 325 (65 FE) TAB at 08:44

## 2025-04-30 RX ADMIN — HYDROCODONE BITARTRATE AND ACETAMINOPHEN 1 TABLET: 5; 325 TABLET ORAL at 13:40

## 2025-04-30 RX ADMIN — SODIUM BICARBONATE: 84 INJECTION, SOLUTION INTRAVENOUS at 23:26

## 2025-04-30 RX ADMIN — LEVOTHYROXINE SODIUM 150 MCG: 0.1 TABLET ORAL at 06:39

## 2025-04-30 RX ADMIN — HEPARIN SODIUM 5000 UNITS: 5000 INJECTION INTRAVENOUS; SUBCUTANEOUS at 20:55

## 2025-04-30 RX ADMIN — SERTRALINE HYDROCHLORIDE 50 MG: 50 TABLET ORAL at 08:44

## 2025-04-30 RX ADMIN — ASPIRIN 81 MG: 81 TABLET, COATED ORAL at 08:44

## 2025-04-30 ASSESSMENT — PAIN SCALES - GENERAL
PAINLEVEL_OUTOF10: 2
PAINLEVEL_OUTOF10: 8
PAINLEVEL_OUTOF10: 4
PAINLEVEL_OUTOF10: 8

## 2025-04-30 ASSESSMENT — PAIN DESCRIPTION - ORIENTATION
ORIENTATION: LOWER
ORIENTATION: LEFT

## 2025-04-30 ASSESSMENT — PAIN DESCRIPTION - DESCRIPTORS
DESCRIPTORS: ACHING;SORE
DESCRIPTORS: ACHING;DISCOMFORT

## 2025-04-30 ASSESSMENT — PAIN - FUNCTIONAL ASSESSMENT: PAIN_FUNCTIONAL_ASSESSMENT: PREVENTS OR INTERFERES SOME ACTIVE ACTIVITIES AND ADLS

## 2025-04-30 ASSESSMENT — PAIN DESCRIPTION - LOCATION
LOCATION: BACK
LOCATION: BACK;SHOULDER

## 2025-04-30 NOTE — CARE COORDINATION
4/30/25 Update CM Pt admitted 4/25/25 s/p pacer upgrade. Renal following for RADHA. Now on Sodium Bicarb and Bumex gtts. O2 3L/NC which is baseline for pt (does not know name of provider.) DC plan to return home with [facility]. Wife to provide transportation   Yomaira CABELLO RN-BC  [phone]

## 2025-04-30 NOTE — PROGRESS NOTES
Patient educated on importance of using urinal for strict intake and output to ensure treatments are effective. Patient states he will attempt however has urgency and frequent accidents .

## 2025-04-30 NOTE — PROGRESS NOTES
[facility]  Internal Medicine Residency Program  Progress Note - House Team     Patient:  Wander Rocha 69 y.o. male MRN: [id number]     Date of Service: 4/30/2025     CC: RADHA   Overnight events: no acute events      Subjective     Patient was transferred to IM House Team 2 service on 4/30/25.     Wander is a 69 year old male with pMHx of Afib, HTN, HLD, IDDM type 2, CKD, chronic anemia, NIKO, hypothyroidism, HFpEF and NSVT who was initially admitted on 4/22/25 under EP service for upgrade of his pacemaker to a CRT-P device. CXR after the procedure showed pulmonary congestion and he was given bumex for diuresis which subsequently increased his creatinine and has been uptrending since. Nephrology has been consulted and is currently managing the patient with a bumex drip at 0.5 mg/hr. Creatinine appears to have peaked at 2.4 (baseline was 1.2) and creatinine was 2.1 this morning. VS stable and oxygen requirements has been decreased to 3 L O2 via NC.     On exam today, he was sitting at bedside with his wife. He does look volume overloaded with bilateral lower extremity edema and overall appearance of anasarca. He reports he has been having sob even before the pacemaker upgrade procedure and the sob is still the same. He reports no issues with urination.     Objective     Physical Exam:  Vitals: /69   Pulse 63   Temp 97.4 °F (36.3 °C) (Temporal)   Resp 16   Ht 1.702 m (5' 7\")   Wt 103.9 kg (229 lb)   SpO2 98%   BMI 35.87 kg/m²     I & O - 24hr: I/O this shift:  In: 669.6 [P.O.:120; I.V.:549.6]  Out: 309 [Urine:309]   General Appearance: alert, appears stated age, cooperative, and no distress  HEENT:  Head: Normal, normocephalic, atraumatic.  Neck: no adenopathy, no carotid bruit, and supple, symmetrical, trachea midline  Lung: bibasilar rales, no use of accessory muscles   Heart: regular rate and rhythm, S1, S2 normal, no murmur, click, rub or gallop  Abdomen: distended, firm, non-tender;

## 2025-04-30 NOTE — PLAN OF CARE
Problem: Chronic Conditions and Co-morbidities  Goal: Patient's chronic conditions and co-morbidity symptoms are monitored and maintained or improved  Outcome: Progressing  Flowsheets (Taken 4/30/2025 0840)  Care Plan - Patient's Chronic Conditions and Co-Morbidity Symptoms are Monitored and Maintained or Improved: Monitor and assess patient's chronic conditions and comorbid symptoms for stability, deterioration, or improvement     Problem: Discharge Planning  Goal: Discharge to home or other facility with appropriate resources  Outcome: Progressing  Flowsheets (Taken 4/30/2025 0840)  Discharge to home or other facility with appropriate resources: Refer to discharge planning if patient needs post-hospital services based on physician order or complex needs related to functional status, cognitive ability or social support system     Problem: ABCDS Injury Assessment  Goal: Absence of physical injury  Outcome: Progressing     Problem: Pain  Goal: Verbalizes/displays adequate comfort level or baseline comfort level  Outcome: Progressing     Problem: Safety - Adult  Goal: Free from fall injury  Outcome: Progressing     Problem: Respiratory - Adult  Goal: Achieves optimal ventilation and oxygenation  Outcome: Progressing     Problem: Cardiovascular - Adult  Goal: Maintains optimal cardiac output and hemodynamic stability  Outcome: Progressing     Problem: Metabolic/Fluid and Electrolytes - Adult  Goal: Electrolytes maintained within normal limits  Outcome: Progressing  Flowsheets (Taken 4/30/2025 0840)  Electrolytes maintained within normal limits:   Monitor labs and assess patient for signs and symptoms of electrolyte imbalances   Administer electrolyte replacement as ordered  Goal: Hemodynamic stability and optimal renal function maintained  Outcome: Progressing  Flowsheets (Taken 4/30/2025 0840)  Hemodynamic stability and optimal renal function maintained: Monitor labs and assess for signs and symptoms of volume  excess or deficit  Goal: Glucose maintained within prescribed range  Outcome: Progressing  Flowsheets (Taken 4/30/2025 0842)  Glucose maintained within prescribed range:   Monitor blood glucose as ordered   Assess for signs and symptoms of hyperglycemia and hypoglycemia   Assess barriers to adequate nutritional intake and initiate nutrition consult as needed     Problem: Musculoskeletal - Adult  Goal: Return mobility to safest level of function  Outcome: Progressing  Goal: Maintain proper alignment of affected body part  Outcome: Progressing  Goal: Return ADL status to a safe level of function  Outcome: Progressing     Problem: Gastrointestinal - Adult  Goal: Minimal or absence of nausea and vomiting  Outcome: Progressing  Goal: Maintains or returns to baseline bowel function  Outcome: Progressing  Goal: Maintains adequate nutritional intake  Outcome: Progressing  Goal: Establish and maintain optimal ostomy function  Outcome: Progressing     Problem: Skin/Tissue Integrity  Goal: Skin integrity remains intact  Description: 1.  Monitor for areas of redness and/or skin breakdown2.  Assess vascular access sites hourly3.  Every 4-6 hours minimum:  Change oxygen saturation probe site4.  Every 4-6 hours:  If on nasal continuous positive airway pressure, respiratory therapy assess nares and determine need for appliance change or resting period  Outcome: Progressing

## 2025-04-30 NOTE — PROGRESS NOTES
Department of Internal Medicine  Nephrology Progress Note    Events reviewed.    SUBJECTIVE: We are following Wander Rocha for RADHA. Patient reports no complaints.     PHYSICAL EXAM:      Vitals:    VITALS:  BP (!) 131/55   Pulse 60   Temp 97.2 °F (36.2 °C)   Resp 18   Ht 1.702 m (5' 7\")   Wt 103.9 kg (229 lb)   SpO2 95%   BMI 35.87 kg/m²   24HR INTAKE/OUTPUT:    Intake/Output Summary (Last 24 hours) at 4/30/2025 1129  Last data filed at 4/30/2025 0810  Gross per 24 hour   Intake 1208.04 ml   Output 1120 ml   Net 88.04 ml     Scheduled Meds:   doxycycline hyclate  100 mg Oral 2 times per day    vitamin D  2,000 Units Oral Daily    aspirin  81 mg Oral Daily    ferrous sulfate  325 mg Oral Every Other Day    insulin glargine  25 Units SubCUTAneous BID    atorvastatin  40 mg Oral Daily    metoprolol succinate  25 mg Oral Daily    sertraline  50 mg Oral Daily    allopurinol  200 mg Oral Daily    [Held by provider] lisinopril  5 mg Oral Daily    sodium chloride flush  5-40 mL IntraVENous 2 times per day    levothyroxine  150 mcg Oral Daily     Continuous Infusions:   sodium bicarbonate 150 mEq in dextrose 5 % 1,000 mL infusion 40 mL/hr at 04/30/25 0739    bumetanide (BUMEX) 12.5 mg in sodium chloride 0.9 % 125 mL infusion 0.5 mg/hr (04/30/25 0739)    dextrose      sodium chloride       PRN Meds:.HYDROcodone 5 mg - acetaminophen, glucose, dextrose bolus **OR** dextrose bolus, glucagon (rDNA), dextrose, sodium chloride flush, sodium chloride, ondansetron, acetaminophen     Constitutional:  Awake, alert, oriented, in NAD  HEENT:  PERRLA, normocephalic, atraumatic  Respiratory: Diminished  Cardiovascular/Edema:  RRR, normal S1, normal S2  Gastrointestinal:  Soft, flat, non-distended, non-tender  Neurologic:  Nonfocal  Skin:  warm, dry, no rashes, no lesions    DATA:    CBC:   Lab Results   Component Value Date/Time    WBC 9.5 04/24/2025 05:08 AM    RBC 2.89 04/24/2025 05:08 AM    HGB 8.7 04/24/2025 05:08 AM    HCT

## 2025-04-30 NOTE — PROGRESS NOTES
[facility]- [facility] [place]- Electrophysiology  Inpatient progress note  Wander Rocha  1955  Date of Service: 4/30/2025  PCP: Oly Crespo MD  Electrophysiologist: Elsie Pereira MD  Attending Electrophysiologist:Elsie Pereira MD          Subjective: 4/17/25:Wander Rocha is seen for follow-up and management of pacemaker in situ and persistent atrial fibrillation. Wander Rocha is a 69 y.o. male who has a history of HTN, HLD, insulin requiring T2DM with peripheral neuropathy, CKD, chronic anemia, pulmonary nodules, NIKO compliant with treatment, restrictive lung disease with qhs supplemental O2 use (PRN during the day), former tobacco smoker, hypothyroidism on HRT, questionable chronic HFpEF, NSVT, SND s/p dual-chamber Medtronic PPM placement 10/2017, persistent AF on chronic Eliquis therapy.  He was seen in office 08/15/2022  and was RV pacing 59.2% at that  time. He then followed up on 01/07/2025 and was RV pacing 94% A TTE was ordered and showed an EF of 45%. A follow up stress test showed low risk 02/24/2025. He is agreeable for CRT-P upgrade. 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 Risks, benefits, and alternatives of CRT-P upgrade were discussed in detail today. These risks include but are not limited to bleeding, infection, blood clot, pneumothorax, hemothorax,cardiac valve damage, cardiac perforation and tamponade required emergent thoracotomy, contrast induced nephropathy leading to short or even long term dialysis, vascular injury requiring emergent surgical repair, lead dislodgement, stroke and even death. The patient understands these risks and agrees to proceed with CRT-P upgrade.     4/24/25: Patient underwent upgrade of his pacemaker to a CRT-P device yesterday.  His initial chest x-ray shows evidence for pulmonary  of that time involved face-to-face time providing counseling and or coordination of care with the other providers, reviewing records/tests, counseling/education of the patient, ordering medications/tests/procedures, coordinating care, and documenting clinical information in the EHR.        Thank you for allowing me to participate in your patient's care.  Please call me if there are any questions or concerns.      Discussed with Dr. Pereira.   Martell Momin, APRN - CNP  Cardiac Electrophysiology  [facility]  [facility] [place]: Electrophysiology  10:55 AM  4/30/2025    Attending Physician's Statement    Patient seen with the ANP. Agree with the findings, assessment and plan. Management plan was discussed. I have personally interviewed the patient, independently performed a focused cardiac examination, reviewed the pertinent laboratory and diagnostic testing with the patient and directly participated in the medical decision-making as noted above with the following additions:     Off IV Dobutamine. Remains on IV Bumex and IV Sodium Bicarbonate. Creatinine is stable at 2.1 and Sodium improved. Limited echo showed LV EF 50-55%. Will resume Toprol XL. Consult Internal medicine for medical management. HF and RADHA on CKD management per Nephrology.    I have spent a total of 50 minutes with the patient and the family reviewing the above stated recommendations.  And a total of >50% of that time involved face-to-face time providing counseling and/or coordination of care with the other providers, reviewing records/tests, counseling/education of the patient, ordering medications/tests/procedures, coordinating care, and documenting clinical information in the EHR.     Elsie Pereira MD  Cardiac Electrophysiology  [facility]  [facility] [place]: [facility]  11:41 AM  4/30/2025

## 2025-04-30 NOTE — PLAN OF CARE
Problem: Chronic Conditions and Co-morbidities  Goal: Patient's chronic conditions and co-morbidity symptoms are monitored and maintained or improved  4/29/2025 2054 by Brionna Temple RN  Outcome: Progressing  Flowsheets (Taken 4/26/2025 0800 by Mleissa Serna, RN)  Care Plan - Patient's Chronic Conditions and Co-Morbidity Symptoms are Monitored and Maintained or Improved: Monitor and assess patient's chronic conditions and comorbid symptoms for stability, deterioration, or improvement  4/29/2025 1216 by Maryann Hidalgo RN  Outcome: Progressing     Problem: Discharge Planning  Goal: Discharge to home or other facility with appropriate resources  4/29/2025 2054 by Brionna Temple RN  Outcome: Progressing  Flowsheets (Taken 4/26/2025 0800 by Melissa Serna, RN)  Discharge to home or other facility with appropriate resources: Identify barriers to discharge with patient and caregiver  4/29/2025 1216 by Maryann Hidalgo RN  Outcome: Progressing     Problem: ABCDS Injury Assessment  Goal: Absence of physical injury  4/29/2025 2054 by Brionna Temple RN  Outcome: Progressing  Flowsheets (Taken 4/29/2025 2054)  Absence of Physical Injury: Implement safety measures based on patient assessment  4/29/2025 1216 by Maryann Hidalgo RN  Outcome: Progressing     Problem: Pain  Goal: Verbalizes/displays adequate comfort level or baseline comfort level  4/29/2025 2054 by Brionna Temple RN  Outcome: Progressing  Flowsheets (Taken 4/26/2025 0054 by Clau Rivera, RN)  Verbalizes/displays adequate comfort level or baseline comfort level:   Encourage patient to monitor pain and request assistance   Assess pain using appropriate pain scale  4/29/2025 1216 by Maryann Hidalgo RN  Outcome: Progressing     Problem: Safety - Adult  Goal: Free from fall injury  4/29/2025 2054 by Brionna Temple RN  Outcome: Progressing  Flowsheets (Taken 4/29/2025 2054)  Free From Fall Injury: Instruct  ordered  4/29/2025 1216 by Maryann Hidalgo RN  Outcome: Progressing  Goal: Glucose maintained within prescribed range  4/29/2025 2054 by Brionna Temple RN  Outcome: Progressing  Flowsheets (Taken 4/29/2025 2054)  Glucose maintained within prescribed range:   Monitor blood glucose as ordered   Assess for signs and symptoms of hyperglycemia and hypoglycemia   Administer ordered medications to maintain glucose within target range  4/29/2025 1216 by Maryann Hidalgo RN  Outcome: Progressing     Problem: Musculoskeletal - Adult  Goal: Return mobility to safest level of function  4/29/2025 2054 by Brionna Temple RN  Outcome: Progressing  Flowsheets (Taken 4/29/2025 2054)  Return Mobility to Safest Level of Function:   Assess patient stability and activity tolerance for standing, transferring and ambulating with or without assistive devices   Assist with transfers and ambulation using safe patient handling equipment as needed   Ensure adequate protection for wounds/incisions during mobilization  4/29/2025 1216 by Maryann Hidalgo RN  Outcome: Progressing  Goal: Maintain proper alignment of affected body part  4/29/2025 2054 by Brionna Temple RN  Outcome: Progressing  Flowsheets (Taken 4/29/2025 2054)  Maintain proper alignment of affected body part: Support and protect limb and body alignment per provider's orders  4/29/2025 1216 by Maryann Hidalgo RN  Outcome: Progressing  Goal: Return ADL status to a safe level of function  4/29/2025 2054 by Brionna Temple RN  Outcome: Progressing  Flowsheets (Taken 4/29/2025 2054)  Return ADL Status to a Safe Level of Function:   Administer medication as ordered   Assess activities of daily living deficits and provide assistive devices as needed  4/29/2025 1216 by Maryann Hidalgo RN  Outcome: Progressing     Problem: Gastrointestinal - Adult  Goal: Minimal or absence of nausea and vomiting  4/29/2025 2054 by Brionna Temple RN  Outcome:

## 2025-05-01 ENCOUNTER — HOSPITAL ENCOUNTER (OUTPATIENT)
Dept: OTHER | Age: 70
Setting detail: THERAPIES SERIES
DRG: 871 | End: 2025-05-01
Payer: MEDICARE

## 2025-05-01 LAB
ANION GAP SERPL CALCULATED.3IONS-SCNC: 12 MMOL/L (ref 7–16)
ANION GAP SERPL CALCULATED.3IONS-SCNC: 14 MMOL/L (ref 7–16)
ANION GAP SERPL CALCULATED.3IONS-SCNC: NORMAL MMOL/L (ref 9–17)
BNP SERPL-MCNC: 3176 PG/ML (ref 0–125)
BUN SERPL-MCNC: 63 MG/DL (ref 8–23)
BUN SERPL-MCNC: 63 MG/DL (ref 8–23)
BUN SERPL-MCNC: NORMAL MG/DL (ref 8–23)
BUN/CREAT SERPL: NORMAL (ref 9–20)
CALCIUM SERPL-MCNC: 8 MG/DL (ref 8.8–10.2)
CALCIUM SERPL-MCNC: 8.1 MG/DL (ref 8.8–10.2)
CALCIUM SERPL-MCNC: NORMAL MG/DL (ref 8.6–10.4)
CHLORIDE SERPL-SCNC: 91 MMOL/L (ref 98–107)
CHLORIDE SERPL-SCNC: 91 MMOL/L (ref 98–107)
CHLORIDE SERPL-SCNC: NORMAL MMOL/L (ref 98–107)
CHOLEST SERPL-MCNC: 83 MG/DL
CO2 SERPL-SCNC: 23 MMOL/L (ref 22–29)
CO2 SERPL-SCNC: 25 MMOL/L (ref 22–29)
CO2 SERPL-SCNC: NORMAL MMOL/L (ref 20–31)
CREAT SERPL-MCNC: 1.7 MG/DL (ref 0.7–1.2)
CREAT SERPL-MCNC: 1.7 MG/DL (ref 0.7–1.2)
CREAT SERPL-MCNC: NORMAL MG/DL (ref 0.7–1.2)
GFR, ESTIMATED: 41 ML/MIN/1.73M2
GFR, ESTIMATED: 43 ML/MIN/1.73M2
GFR, ESTIMATED: NORMAL ML/MIN/1.73M2
GLUCOSE BLD-MCNC: 119 MG/DL (ref 74–99)
GLUCOSE BLD-MCNC: 157 MG/DL (ref 74–99)
GLUCOSE BLD-MCNC: 84 MG/DL (ref 74–99)
GLUCOSE BLD-MCNC: 91 MG/DL (ref 74–99)
GLUCOSE SERPL-MCNC: 120 MG/DL (ref 74–99)
GLUCOSE SERPL-MCNC: 85 MG/DL (ref 74–99)
GLUCOSE SERPL-MCNC: NORMAL MG/DL (ref 70–99)
HDLC SERPL-MCNC: 26 MG/DL
LDLC SERPL CALC-MCNC: 43 MG/DL
POTASSIUM SERPL-SCNC: 4.2 MMOL/L (ref 3.5–5.1)
POTASSIUM SERPL-SCNC: 4.5 MMOL/L (ref 3.5–5.1)
POTASSIUM SERPL-SCNC: NORMAL MMOL/L (ref 3.7–5.3)
SODIUM SERPL-SCNC: 127 MMOL/L (ref 136–145)
SODIUM SERPL-SCNC: 128 MMOL/L (ref 136–145)
SODIUM SERPL-SCNC: NORMAL MMOL/L (ref 135–144)
TOTAL PROTEIN, URINE: 7 MG/DL (ref 0–12)
TRIGL SERPL-MCNC: 75 MG/DL
VLDLC SERPL CALC-MCNC: 15 MG/DL

## 2025-05-01 PROCEDURE — 6370000000 HC RX 637 (ALT 250 FOR IP): Performed by: NURSE PRACTITIONER

## 2025-05-01 PROCEDURE — 2060000000 HC ICU INTERMEDIATE R&B

## 2025-05-01 PROCEDURE — 83880 ASSAY OF NATRIURETIC PEPTIDE: CPT

## 2025-05-01 PROCEDURE — 82962 GLUCOSE BLOOD TEST: CPT

## 2025-05-01 PROCEDURE — 6360000002 HC RX W HCPCS: Performed by: STUDENT IN AN ORGANIZED HEALTH CARE EDUCATION/TRAINING PROGRAM

## 2025-05-01 PROCEDURE — 2700000000 HC OXYGEN THERAPY PER DAY

## 2025-05-01 PROCEDURE — 2500000003 HC RX 250 WO HCPCS: Performed by: INTERNAL MEDICINE

## 2025-05-01 PROCEDURE — 80048 BASIC METABOLIC PNL TOTAL CA: CPT

## 2025-05-01 PROCEDURE — 80061 LIPID PANEL: CPT

## 2025-05-01 PROCEDURE — 84156 ASSAY OF PROTEIN URINE: CPT

## 2025-05-01 PROCEDURE — 6370000000 HC RX 637 (ALT 250 FOR IP)

## 2025-05-01 PROCEDURE — 6370000000 HC RX 637 (ALT 250 FOR IP): Performed by: INTERNAL MEDICINE

## 2025-05-01 PROCEDURE — 6370000000 HC RX 637 (ALT 250 FOR IP): Performed by: STUDENT IN AN ORGANIZED HEALTH CARE EDUCATION/TRAINING PROGRAM

## 2025-05-01 PROCEDURE — 36415 COLL VENOUS BLD VENIPUNCTURE: CPT

## 2025-05-01 RX ORDER — INSULIN GLARGINE 100 [IU]/ML
16 INJECTION, SOLUTION SUBCUTANEOUS 2 TIMES DAILY
Status: DISCONTINUED | OUTPATIENT
Start: 2025-05-01 | End: 2025-05-02

## 2025-05-01 RX ADMIN — HYDROCODONE BITARTRATE AND ACETAMINOPHEN 1 TABLET: 5; 325 TABLET ORAL at 16:56

## 2025-05-01 RX ADMIN — INSULIN GLARGINE 16 UNITS: 100 INJECTION, SOLUTION SUBCUTANEOUS at 21:09

## 2025-05-01 RX ADMIN — SERTRALINE HYDROCHLORIDE 50 MG: 50 TABLET ORAL at 09:09

## 2025-05-01 RX ADMIN — ATORVASTATIN CALCIUM 40 MG: 40 TABLET, FILM COATED ORAL at 09:08

## 2025-05-01 RX ADMIN — DOXYCYCLINE HYCLATE 100 MG: 100 CAPSULE ORAL at 09:08

## 2025-05-01 RX ADMIN — SODIUM CHLORIDE, PRESERVATIVE FREE 10 ML: 5 INJECTION INTRAVENOUS at 09:09

## 2025-05-01 RX ADMIN — POLYETHYLENE GLYCOL 3350 17 G: 17 POWDER, FOR SOLUTION ORAL at 09:09

## 2025-05-01 RX ADMIN — LEVOTHYROXINE SODIUM 150 MCG: 0.1 TABLET ORAL at 05:13

## 2025-05-01 RX ADMIN — SODIUM CHLORIDE, PRESERVATIVE FREE 10 ML: 5 INJECTION INTRAVENOUS at 21:09

## 2025-05-01 RX ADMIN — Medication 2000 UNITS: at 09:08

## 2025-05-01 RX ADMIN — HEPARIN SODIUM 5000 UNITS: 5000 INJECTION INTRAVENOUS; SUBCUTANEOUS at 21:10

## 2025-05-01 RX ADMIN — HEPARIN SODIUM 5000 UNITS: 5000 INJECTION INTRAVENOUS; SUBCUTANEOUS at 05:13

## 2025-05-01 RX ADMIN — SENNOSIDES 8.6 MG: 8.6 TABLET, COATED ORAL at 21:09

## 2025-05-01 RX ADMIN — DOXYCYCLINE HYCLATE 100 MG: 100 CAPSULE ORAL at 21:09

## 2025-05-01 RX ADMIN — ASPIRIN 81 MG: 81 TABLET, COATED ORAL at 09:08

## 2025-05-01 RX ADMIN — METOPROLOL SUCCINATE 25 MG: 25 TABLET, EXTENDED RELEASE ORAL at 09:08

## 2025-05-01 RX ADMIN — ALLOPURINOL 200 MG: 100 TABLET ORAL at 09:08

## 2025-05-01 RX ADMIN — HEPARIN SODIUM 5000 UNITS: 5000 INJECTION INTRAVENOUS; SUBCUTANEOUS at 14:12

## 2025-05-01 ASSESSMENT — PAIN SCALES - GENERAL
PAINLEVEL_OUTOF10: 4
PAINLEVEL_OUTOF10: 0
PAINLEVEL_OUTOF10: 9

## 2025-05-01 ASSESSMENT — PAIN DESCRIPTION - ORIENTATION: ORIENTATION: LOWER

## 2025-05-01 ASSESSMENT — PAIN DESCRIPTION - DESCRIPTORS: DESCRIPTORS: ACHING

## 2025-05-01 ASSESSMENT — PAIN DESCRIPTION - LOCATION: LOCATION: BACK

## 2025-05-01 ASSESSMENT — PAIN - FUNCTIONAL ASSESSMENT: PAIN_FUNCTIONAL_ASSESSMENT: ACTIVITIES ARE NOT PREVENTED

## 2025-05-01 NOTE — CARE COORDINATION
5/1/25 Update CM Pt admitted 4/25/25. Remains on Sodium Bicarb and Bumex gtts.  Fluid restriction. Am labs pending.  O3 3L/NC-baseline. DC plan to return home with [facility] for SN and PT. Wife to provide transportation.   Yomaira FLORESN RN-BC  [phone]

## 2025-05-01 NOTE — PLAN OF CARE
Problem: Chronic Conditions and Co-morbidities  Goal: Patient's chronic conditions and co-morbidity symptoms are monitored and maintained or improved  5/1/2025 1031 by Deonna Mas RN  Outcome: Progressing  4/30/2025 2331 by Johny Singh RN  Outcome: Progressing  Flowsheets (Taken 4/30/2025 2030 by Velia Zayas RN)  Care Plan - Patient's Chronic Conditions and Co-Morbidity Symptoms are Monitored and Maintained or Improved: Monitor and assess patient's chronic conditions and comorbid symptoms for stability, deterioration, or improvement     Problem: Discharge Planning  Goal: Discharge to home or other facility with appropriate resources  5/1/2025 1031 by Deonna Mas RN  Outcome: Progressing  4/30/2025 2331 by Johny Singh RN  Outcome: Progressing  Flowsheets (Taken 4/30/2025 2030 by Velia Zayas RN)  Discharge to home or other facility with appropriate resources: Identify barriers to discharge with patient and caregiver     Problem: ABCDS Injury Assessment  Goal: Absence of physical injury  5/1/2025 1031 by Deonna Mas RN  Outcome: Progressing  Flowsheets (Taken 5/1/2025 0800)  Absence of Physical Injury: Implement safety measures based on patient assessment  4/30/2025 2331 by Johny Singh RN  Outcome: Progressing  Flowsheets (Taken 4/30/2025 2235 by Velia Zayas RN)  Absence of Physical Injury: Implement safety measures based on patient assessment     Problem: Pain  Goal: Verbalizes/displays adequate comfort level or baseline comfort level  5/1/2025 1031 by Deonna Mas RN  Outcome: Progressing  4/30/2025 2331 by Johny Singh RN  Outcome: Progressing     Problem: Safety - Adult  Goal: Free from fall injury  Outcome: Progressing  Flowsheets (Taken 4/30/2025 2235 by Velia Zayas RN)  Free From Fall Injury: Instruct family/caregiver on patient safety     Problem: Respiratory - Adult  Goal: Achieves optimal ventilation and oxygenation  Outcome: Progressing

## 2025-05-01 NOTE — PROGRESS NOTES
[facility]  Internal Medicine Residency Program  Progress Note - House Team 1    Patient:  Wander Rocha 69 y.o. male MRN: [id number]     Date of Service: 5/1/2025     CC: Transferred to  house team to service on/30/25 after upgrading his pacemaker to a CRT device  Overnight events: No overnight events    Subjective     Patient was seen and examined at bedside.  He denies chest pain, shortness of breath, abdominal pain.  He also reports improvement of his lower extremity swelling compared to yesterday, also reports good urine output.  No bowel movement so far today.     Objective     Physical Exam:  Vitals: /66   Pulse 60   Temp 97.9 °F (36.6 °C) (Oral)   Resp 18   Ht 1.702 m (5' 7\")   Wt 103.9 kg (229 lb)   SpO2 97%   BMI 35.87 kg/m²     I & O - 24hr: I/O this shift:  In: 0   Out: 100 [Urine:100]   General Appearance: alert, appears stated age, and cooperative.  Patient is severely volume overloaded  HEENT:  Head: Normocephalic, no lesions, without obvious abnormality.  Neck: thyroid not enlarged, symmetric, no tenderness/mass/nodules  Lung: rales bilaterally  Heart: regular rate and rhythm, S1, S2 normal, no murmur, click, rub or gallop  Abdomen: Abdomen is distended and nontender  Extremities:   There is 4+ edema on bilateral lower extremities  Musculokeletal: No joint swelling, no muscle tenderness. ROM normal in all joints of extremities.   Neurologic: Mental status: Alert, oriented, thought content appropriate  Subject  Pertinent Labs & Imaging Studies   carmela  CBC:   Lab Results   Component Value Date/Time    WBC 9.5 04/24/2025 05:08 AM    RBC 2.89 04/24/2025 05:08 AM    HGB 8.7 04/24/2025 05:08 AM    HCT 27.7 04/24/2025 05:08 AM    MCV 95.8 04/24/2025 05:08 AM    MCH 30.1 04/24/2025 05:08 AM    MCHC 31.4 04/24/2025 05:08 AM    RDW 16.9 04/24/2025 05:08 AM     04/24/2025 05:08 AM    MPV 9.7 04/24/2025 05:08 AM     CMP:    Lab Results   Component Value Date/Time    NA

## 2025-05-01 NOTE — PROGRESS NOTES
Department of Internal Medicine  Nephrology Progress Note    Events reviewed.    SUBJECTIVE: We are following Wander Rocha for RADHA. Patient reports no complaints.     PHYSICAL EXAM:      Vitals:    VITALS:  /66   Pulse 60   Temp 97.9 °F (36.6 °C) (Oral)   Resp 18   Ht 1.702 m (5' 7\")   Wt 103.9 kg (229 lb)   SpO2 97%   BMI 35.87 kg/m²   24HR INTAKE/OUTPUT:    Intake/Output Summary (Last 24 hours) at 5/1/2025 1131  Last data filed at 5/1/2025 0800  Gross per 24 hour   Intake 259.6 ml   Output 759 ml   Net -499.4 ml     Scheduled Meds:   insulin glargine  16 Units SubCUTAneous BID    insulin lispro  0-4 Units SubCUTAneous 4x Daily AC & HS    polyethylene glycol  17 g Oral Daily    senna  1 tablet Oral Nightly    heparin (porcine)  5,000 Units SubCUTAneous 3 times per day    doxycycline hyclate  100 mg Oral 2 times per day    vitamin D  2,000 Units Oral Daily    aspirin  81 mg Oral Daily    ferrous sulfate  325 mg Oral Every Other Day    atorvastatin  40 mg Oral Daily    metoprolol succinate  25 mg Oral Daily    sertraline  50 mg Oral Daily    allopurinol  200 mg Oral Daily    [Held by provider] lisinopril  5 mg Oral Daily    sodium chloride flush  5-40 mL IntraVENous 2 times per day    levothyroxine  150 mcg Oral Daily     Continuous Infusions:   sodium bicarbonate 150 mEq in dextrose 5 % 1,000 mL infusion 40 mL/hr at 04/30/25 2326    bumetanide (BUMEX) 12.5 mg in sodium chloride 0.9 % 125 mL infusion 0.5 mg/hr (04/30/25 0739)    dextrose      sodium chloride       PRN Meds:.HYDROcodone 5 mg - acetaminophen, glucose, dextrose bolus **OR** dextrose bolus, glucagon (rDNA), dextrose, sodium chloride flush, sodium chloride, ondansetron, acetaminophen     Constitutional:  Awake, alert, oriented, in NAD  HEENT:  PERRLA, normocephalic, atraumatic  Respiratory: Diminished  Cardiovascular/Edema:  RRR, normal S1, normal S2  Gastrointestinal:  Soft, flat, non-distended, non-tender  Neurologic:  Nonfocal  Skin:   consultation significant medications include Bumex, metoprolol and lisinopril.  Chest x-ray shows cardiomegaly with mild interstitial edema and a small left pleural effusion and was given IV Bumex.    IMPRESSION/RECOMMENDATIONS:      RADHA stage I likely 2/2 hemodynamically mediated acute decompensated heart failure versus ACE inhibition, renal function improve creatinine down to 1.7 mg/dL, to continue Bumex drip    Hypotonic hyponatremia with hypervolemia, 2/2 acute decompensated heart failure, sodium levels slowly improving up to 127    Hyperchloremic metabolic acidosis with respiratory compensation, started on bicarbonate drip.  Bicarb levels improved.    HFpEF 45%, proBNP 2341, Bumex  --------------------------------  Persistent atrial fibrillation  Pacemaker insertion 4/23/2025  Hypothyroidism, on levothyroxine  Type II DM    Plan:    Discontinue bicarbonate drip  Continue Bumex gtt to 0.5 mg/hr  Continue to monitor renal function  Continue to monitor sodium level, BMP at 4  Continue fluid restriction dry tray      Electronically signed by Manoj Chavez MD on 5/1/2025 at 11:31 AM

## 2025-05-01 NOTE — PROGRESS NOTES
Messaged Dr. Corral via [employer] to clarify if lantus should be given with morning blood sugar results. Lantus to be held this morning per MD.

## 2025-05-02 LAB
ANION GAP SERPL CALCULATED.3IONS-SCNC: 14 MMOL/L (ref 7–16)
BASOPHILS # BLD: 0.07 K/UL (ref 0–0.2)
BASOPHILS NFR BLD: 1 % (ref 0–2)
BNP SERPL-MCNC: 5173 PG/ML (ref 0–125)
BUN SERPL-MCNC: 63 MG/DL (ref 8–23)
CA-I BLD-SCNC: 1.09 MMOL/L (ref 1.15–1.33)
CALCIUM SERPL-MCNC: 8.4 MG/DL (ref 8.8–10.2)
CHLORIDE SERPL-SCNC: 91 MMOL/L (ref 98–107)
CO2 SERPL-SCNC: 24 MMOL/L (ref 22–29)
CREAT SERPL-MCNC: 1.7 MG/DL (ref 0.7–1.2)
EOSINOPHIL # BLD: 0.71 K/UL (ref 0.05–0.5)
EOSINOPHILS RELATIVE PERCENT: 9 % (ref 0–6)
ERYTHROCYTE [DISTWIDTH] IN BLOOD BY AUTOMATED COUNT: 17 % (ref 11.5–15)
GFR, ESTIMATED: 44 ML/MIN/1.73M2
GLUCOSE BLD-MCNC: 129 MG/DL (ref 74–99)
GLUCOSE BLD-MCNC: 150 MG/DL (ref 74–99)
GLUCOSE BLD-MCNC: 204 MG/DL (ref 74–99)
GLUCOSE BLD-MCNC: 76 MG/DL (ref 74–99)
GLUCOSE SERPL-MCNC: 61 MG/DL (ref 74–99)
HCT VFR BLD AUTO: 28.6 % (ref 37–54)
HGB BLD-MCNC: 9.1 G/DL (ref 12.5–16.5)
LYMPHOCYTES NFR BLD: 0.43 K/UL (ref 1.5–4)
LYMPHOCYTES RELATIVE PERCENT: 5 % (ref 20–42)
MAGNESIUM SERPL-MCNC: 1.9 MG/DL (ref 1.6–2.4)
MAGNESIUM SERPL-MCNC: 1.9 MG/DL (ref 1.6–2.4)
MCH RBC QN AUTO: 30.8 PG (ref 26–35)
MCHC RBC AUTO-ENTMCNC: 31.8 G/DL (ref 32–34.5)
MCV RBC AUTO: 96.9 FL (ref 80–99.9)
METAMYELOCYTES ABSOLUTE COUNT: 0.14 K/UL (ref 0–0.12)
METAMYELOCYTES: 2 % (ref 0–1)
MONOCYTES NFR BLD: 0.36 K/UL (ref 0.1–0.95)
MONOCYTES NFR BLD: 4 % (ref 2–12)
MYELOCYTES ABSOLUTE COUNT: 0.07 K/UL
MYELOCYTES: 1 %
NEUTROPHILS NFR BLD: 78 % (ref 43–80)
NEUTS SEG NFR BLD: 6.32 K/UL (ref 1.8–7.3)
PHOSPHATE SERPL-MCNC: 4 MG/DL (ref 2.5–4.5)
PLATELET # BLD AUTO: 212 K/UL (ref 130–450)
PMV BLD AUTO: 8.7 FL (ref 7–12)
POTASSIUM SERPL-SCNC: 4.3 MMOL/L (ref 3.5–5.1)
RBC # BLD AUTO: 2.95 M/UL (ref 3.8–5.8)
RBC # BLD: ABNORMAL 10*6/UL
SODIUM SERPL-SCNC: 129 MMOL/L (ref 136–145)
WBC OTHER # BLD: 8.1 K/UL (ref 4.5–11.5)

## 2025-05-02 PROCEDURE — 82330 ASSAY OF CALCIUM: CPT

## 2025-05-02 PROCEDURE — 6360000002 HC RX W HCPCS: Performed by: STUDENT IN AN ORGANIZED HEALTH CARE EDUCATION/TRAINING PROGRAM

## 2025-05-02 PROCEDURE — 2580000003 HC RX 258

## 2025-05-02 PROCEDURE — 97535 SELF CARE MNGMENT TRAINING: CPT

## 2025-05-02 PROCEDURE — 97530 THERAPEUTIC ACTIVITIES: CPT

## 2025-05-02 PROCEDURE — 99233 SBSQ HOSP IP/OBS HIGH 50: CPT | Performed by: INTERNAL MEDICINE

## 2025-05-02 PROCEDURE — 80048 BASIC METABOLIC PNL TOTAL CA: CPT

## 2025-05-02 PROCEDURE — 6370000000 HC RX 637 (ALT 250 FOR IP): Performed by: STUDENT IN AN ORGANIZED HEALTH CARE EDUCATION/TRAINING PROGRAM

## 2025-05-02 PROCEDURE — 6370000000 HC RX 637 (ALT 250 FOR IP): Performed by: NURSE PRACTITIONER

## 2025-05-02 PROCEDURE — 83880 ASSAY OF NATRIURETIC PEPTIDE: CPT

## 2025-05-02 PROCEDURE — 6370000000 HC RX 637 (ALT 250 FOR IP): Performed by: INTERNAL MEDICINE

## 2025-05-02 PROCEDURE — 36415 COLL VENOUS BLD VENIPUNCTURE: CPT

## 2025-05-02 PROCEDURE — 83735 ASSAY OF MAGNESIUM: CPT

## 2025-05-02 PROCEDURE — 2500000003 HC RX 250 WO HCPCS: Performed by: INTERNAL MEDICINE

## 2025-05-02 PROCEDURE — 85025 COMPLETE CBC W/AUTO DIFF WBC: CPT

## 2025-05-02 PROCEDURE — 82962 GLUCOSE BLOOD TEST: CPT

## 2025-05-02 PROCEDURE — 84100 ASSAY OF PHOSPHORUS: CPT

## 2025-05-02 PROCEDURE — 6360000002 HC RX W HCPCS

## 2025-05-02 PROCEDURE — 2700000000 HC OXYGEN THERAPY PER DAY

## 2025-05-02 PROCEDURE — 2060000000 HC ICU INTERMEDIATE R&B

## 2025-05-02 RX ORDER — INSULIN GLARGINE 100 [IU]/ML
14 INJECTION, SOLUTION SUBCUTANEOUS NIGHTLY
Status: DISCONTINUED | OUTPATIENT
Start: 2025-05-03 | End: 2025-05-09 | Stop reason: HOSPADM

## 2025-05-02 RX ORDER — CALCIUM GLUCONATE 20 MG/ML
1000 INJECTION, SOLUTION INTRAVENOUS ONCE
Status: COMPLETED | OUTPATIENT
Start: 2025-05-02 | End: 2025-05-02

## 2025-05-02 RX ORDER — INSULIN GLARGINE 100 [IU]/ML
15 INJECTION, SOLUTION SUBCUTANEOUS NIGHTLY
Status: DISCONTINUED | OUTPATIENT
Start: 2025-05-03 | End: 2025-05-02

## 2025-05-02 RX ADMIN — ALLOPURINOL 200 MG: 100 TABLET ORAL at 08:18

## 2025-05-02 RX ADMIN — LEVOTHYROXINE SODIUM 150 MCG: 0.1 TABLET ORAL at 05:19

## 2025-05-02 RX ADMIN — SENNOSIDES 8.6 MG: 8.6 TABLET, COATED ORAL at 19:49

## 2025-05-02 RX ADMIN — CALCIUM GLUCONATE 1000 MG: 20 INJECTION, SOLUTION INTRAVENOUS at 15:25

## 2025-05-02 RX ADMIN — INSULIN LISPRO 1 UNITS: 100 INJECTION, SOLUTION INTRAVENOUS; SUBCUTANEOUS at 19:51

## 2025-05-02 RX ADMIN — SODIUM CHLORIDE, PRESERVATIVE FREE 10 ML: 5 INJECTION INTRAVENOUS at 19:52

## 2025-05-02 RX ADMIN — HYDROCODONE BITARTRATE AND ACETAMINOPHEN 1 TABLET: 5; 325 TABLET ORAL at 13:38

## 2025-05-02 RX ADMIN — APIXABAN 5 MG: 5 TABLET, FILM COATED ORAL at 19:50

## 2025-05-02 RX ADMIN — SERTRALINE HYDROCHLORIDE 50 MG: 50 TABLET ORAL at 08:18

## 2025-05-02 RX ADMIN — HYDROCODONE BITARTRATE AND ACETAMINOPHEN 1 TABLET: 5; 325 TABLET ORAL at 22:38

## 2025-05-02 RX ADMIN — BUMETANIDE 0.5 MG/HR: 0.25 INJECTION INTRAMUSCULAR; INTRAVENOUS at 00:43

## 2025-05-02 RX ADMIN — FERROUS SULFATE TAB 325 MG (65 MG ELEMENTAL FE) 325 MG: 325 (65 FE) TAB at 08:18

## 2025-05-02 RX ADMIN — APIXABAN 5 MG: 5 TABLET, FILM COATED ORAL at 13:38

## 2025-05-02 RX ADMIN — ATORVASTATIN CALCIUM 40 MG: 40 TABLET, FILM COATED ORAL at 08:18

## 2025-05-02 RX ADMIN — Medication 2000 UNITS: at 08:18

## 2025-05-02 RX ADMIN — DOXYCYCLINE HYCLATE 100 MG: 100 CAPSULE ORAL at 19:50

## 2025-05-02 RX ADMIN — HEPARIN SODIUM 5000 UNITS: 5000 INJECTION INTRAVENOUS; SUBCUTANEOUS at 05:19

## 2025-05-02 RX ADMIN — DOXYCYCLINE HYCLATE 100 MG: 100 CAPSULE ORAL at 08:18

## 2025-05-02 RX ADMIN — METOPROLOL SUCCINATE 25 MG: 25 TABLET, EXTENDED RELEASE ORAL at 08:17

## 2025-05-02 RX ADMIN — ASPIRIN 81 MG: 81 TABLET, COATED ORAL at 08:18

## 2025-05-02 RX ADMIN — SODIUM CHLORIDE, PRESERVATIVE FREE 10 ML: 5 INJECTION INTRAVENOUS at 08:19

## 2025-05-02 ASSESSMENT — PAIN DESCRIPTION - LOCATION
LOCATION: BACK
LOCATION: BACK

## 2025-05-02 ASSESSMENT — PAIN SCALES - GENERAL
PAINLEVEL_OUTOF10: 6
PAINLEVEL_OUTOF10: 6
PAINLEVEL_OUTOF10: 9

## 2025-05-02 ASSESSMENT — PAIN DESCRIPTION - ORIENTATION
ORIENTATION: LOWER
ORIENTATION: LEFT;RIGHT;LOWER

## 2025-05-02 ASSESSMENT — PAIN SCALES - WONG BAKER
WONGBAKER_NUMERICALRESPONSE: NO HURT
WONGBAKER_NUMERICALRESPONSE: NO HURT

## 2025-05-02 ASSESSMENT — PAIN DESCRIPTION - DESCRIPTORS
DESCRIPTORS: ACHING;DISCOMFORT
DESCRIPTORS: ACHING;DISCOMFORT;DULL;SPASM

## 2025-05-02 NOTE — PROGRESS NOTES
Department of Internal Medicine  Nephrology Progress Note    Events reviewed.    SUBJECTIVE: We are following Wander Rocha for RADHA. Patient reports no complaints.     PHYSICAL EXAM:      Vitals:    VITALS:  /60   Pulse 80   Temp 98.3 °F (36.8 °C) (Temporal)   Resp 14   Ht 1.702 m (5' 7\")   Wt 103.9 kg (229 lb)   SpO2 96%   BMI 35.87 kg/m²   24HR INTAKE/OUTPUT:    Intake/Output Summary (Last 24 hours) at 5/2/2025 0813  Last data filed at 5/2/2025 0521  Gross per 24 hour   Intake 287 ml   Output 400 ml   Net -113 ml     Scheduled Meds:   insulin glargine  16 Units SubCUTAneous BID    insulin lispro  0-4 Units SubCUTAneous 4x Daily AC & HS    polyethylene glycol  17 g Oral Daily    senna  1 tablet Oral Nightly    heparin (porcine)  5,000 Units SubCUTAneous 3 times per day    doxycycline hyclate  100 mg Oral 2 times per day    vitamin D  2,000 Units Oral Daily    aspirin  81 mg Oral Daily    ferrous sulfate  325 mg Oral Every Other Day    atorvastatin  40 mg Oral Daily    metoprolol succinate  25 mg Oral Daily    sertraline  50 mg Oral Daily    allopurinol  200 mg Oral Daily    [Held by provider] lisinopril  5 mg Oral Daily    sodium chloride flush  5-40 mL IntraVENous 2 times per day    levothyroxine  150 mcg Oral Daily     Continuous Infusions:   bumetanide (BUMEX) 12.5 mg in sodium chloride 0.9 % 125 mL infusion 0.5 mg/hr (05/02/25 0043)    dextrose      sodium chloride       PRN Meds:.HYDROcodone 5 mg - acetaminophen, glucose, dextrose bolus **OR** dextrose bolus, glucagon (rDNA), dextrose, sodium chloride flush, sodium chloride, ondansetron, acetaminophen     Constitutional:  Awake, alert, oriented, in NAD  HEENT:  PERRLA, normocephalic, atraumatic  Respiratory: Diminished  Cardiovascular/Edema:  RRR, normal S1, normal S2  Gastrointestinal:  Soft, flat, non-distended, non-tender  Neurologic:  Nonfocal  Skin:  warm, dry, no rashes, no lesions    DATA:    CBC:   Lab Results   Component Value Date/Time

## 2025-05-02 NOTE — PROGRESS NOTES
[facility]  Internal Medicine Residency Program  Progress Note - House Team 1    Patient:  Wander Rocha 69 y.o. male MRN: [id number]     Date of Service: 5/2/2025     CC: Transferred to  house team to service on/30/25 after upgrading his pacemaker to a CRT device  Overnight events: No overnight events    Subjective     Patient was seen and examined at bedside.  He denies chest pain, shortness of breath, abdominal pain.  Reports feeling better. Does not notice significantly increased urinary output. Patient had a small bowel movement yesterday.    Objective     Physical Exam:  Vitals: /63   Pulse 78   Temp 97 °F (36.1 °C)   Resp 19   Ht 1.702 m (5' 7\")   Wt 103.9 kg (229 lb)   SpO2 100%   BMI 35.87 kg/m²     I & O - 24hr: I/O this shift:  In: 120 [P.O.:120]  Out: 405 [Urine:405]   General Appearance: alert, appears stated age, and cooperative.  Patient is severely volume overloaded  HEENT:  Head: Normocephalic, no lesions, without obvious abnormality.  Neck: thyroid not enlarged, symmetric, no tenderness/mass/nodules  Lung: rales bilaterally  Heart: regular rate and rhythm, S1, S2 normal, no murmur, click, rub or gallop  Abdomen: Abdomen is distended and nontender  Extremities:   There is 4+ edema on bilateral lower extremities  Musculokeletal: No joint swelling, no muscle tenderness. ROM normal in all joints of extremities.   Neurologic: Mental status: Alert, oriented, thought content appropriate  Subject  Pertinent Labs & Imaging Studies   carmela  CBC:   Lab Results   Component Value Date/Time    WBC 9.5 04/24/2025 05:08 AM    RBC 2.89 04/24/2025 05:08 AM    HGB 8.7 04/24/2025 05:08 AM    HCT 27.7 04/24/2025 05:08 AM    MCV 95.8 04/24/2025 05:08 AM    MCH 30.1 04/24/2025 05:08 AM    MCHC 31.4 04/24/2025 05:08 AM    RDW 16.9 04/24/2025 05:08 AM     04/24/2025 05:08 AM    MPV 9.7 04/24/2025 05:08 AM     CMP:    Lab Results   Component Value Date/Time     05/02/2025 06:18

## 2025-05-02 NOTE — PROGRESS NOTES
CPAP due to epistaxis        Plan:   Continue Toprol 25mg daily.    IV Bumex per nephrology.   Continue Doxy for a total of 7 days.  Resume Eliquis 5mg BID.   Ep to sign off please call if needed.   Follow up in the device clinic on  05/08/2025 then in 3 month with provider.      I have spent a total of 10 minutes with the patient and the family reviewing the above stated recommendations.  And a total of >50% of that time involved face-to-face time providing counseling and or coordination of care with the other providers, reviewing records/tests, counseling/education of the patient, ordering medications/tests/procedures, coordinating care, and documenting clinical information in the EHR.        Thank you for allowing me to participate in your patient's care.  Please call me if there are any questions or concerns.      Discussed with Dr. Pereira.   Martell Momin, APRN - CNP  Cardiac Electrophysiology  [facility]  [facility] [place]: Electrophysiology  1:09 PM  5/2/2025    Attending Physician's Statement    Patient seen with the ANP. Agree with the findings, assessment and plan. Management plan was discussed. I have personally interviewed the patient, independently performed a focused cardiac examination, reviewed the pertinent laboratory and diagnostic testing with the patient and directly participated in the medical decision-making as noted above with the following additions:     Creatinine improved and stable at 1.7 with baseline at 1.4-1.5. Normal CRT-P function.  Will resume Eliquis. Continue Toprol XL 25 mg daily. Follow up in 2 weeks in device clinic post procedure after discharged. EP will follow peripherally. Please call for any questions or concerns.    I have spent a total of 50 minutes with the patient and the family reviewing the above stated recommendations.  And a total of >50% of that time involved face-to-face time providing counseling and/or coordination of care with the

## 2025-05-02 NOTE — PLAN OF CARE
Problem: Chronic Conditions and Co-morbidities  Goal: Patient's chronic conditions and co-morbidity symptoms are monitored and maintained or improved  5/2/2025 1045 by Zulema Vogt RN  Outcome: Progressing     Problem: Discharge Planning  Goal: Discharge to home or other facility with appropriate resources  5/2/2025 1045 by Zulema Vogt RN  Outcome: Progressing     Problem: ABCDS Injury Assessment  Goal: Absence of physical injury  5/2/2025 1045 by Zulema Vogt RN  Outcome: Progressing     Problem: Pain  Goal: Verbalizes/displays adequate comfort level or baseline comfort level  Outcome: Progressing     Problem: Safety - Adult  Goal: Free from fall injury  Outcome: Progressing     Problem: Respiratory - Adult  Goal: Achieves optimal ventilation and oxygenation  Outcome: Progressing     Problem: Cardiovascular - Adult  Goal: Maintains optimal cardiac output and hemodynamic stability  Outcome: Progressing     Problem: Metabolic/Fluid and Electrolytes - Adult  Goal: Electrolytes maintained within normal limits  Outcome: Progressing     Problem: Metabolic/Fluid and Electrolytes - Adult  Goal: Hemodynamic stability and optimal renal function maintained  Outcome: Progressing     Problem: Metabolic/Fluid and Electrolytes - Adult  Goal: Glucose maintained within prescribed range  Outcome: Progressing     Problem: Musculoskeletal - Adult  Goal: Return mobility to safest level of function  Outcome: Progressing     Problem: Musculoskeletal - Adult  Goal: Maintain proper alignment of affected body part  Outcome: Progressing     Problem: Musculoskeletal - Adult  Goal: Return ADL status to a safe level of function  Outcome: Progressing     Problem: Gastrointestinal - Adult  Goal: Minimal or absence of nausea and vomiting  Outcome: Progressing     Problem: Gastrointestinal - Adult  Goal: Maintains or returns to baseline bowel function  Outcome: Progressing     Problem: Gastrointestinal - Adult  Goal: Maintains

## 2025-05-02 NOTE — CARE COORDINATION
Reviewed chart, pt on bumex drip for 24hrs. Pt up in chair, JOSEMANUEL gee. Met with pt, plan remains home. [facility] following, [facility] orders obtained. Will follow for any other needs. Plan is home with wife and [facility](orders are written).Lina Horvath, MSW, LSW

## 2025-05-02 NOTE — PLAN OF CARE
Problem: Chronic Conditions and Co-morbidities  Goal: Patient's chronic conditions and co-morbidity symptoms are monitored and maintained or improved  5/1/2025 2212 by Santana Ness RN  Outcome: Progressing  5/1/2025 1031 by Deonna Mas RN  Outcome: Progressing     Problem: Discharge Planning  Goal: Discharge to home or other facility with appropriate resources  5/1/2025 2212 by Santana Ness RN  Outcome: Progressing  5/1/2025 1031 by Deonna Mas RN  Outcome: Progressing     Problem: ABCDS Injury Assessment  Goal: Absence of physical injury  5/1/2025 2212 by Santana Ness RN  Outcome: Progressing  5/1/2025 1031 by Deonna Mas RN  Outcome: Progressing  Flowsheets (Taken 5/1/2025 0800)  Absence of Physical Injury: Implement safety measures based on patient assessment     Problem: Pain  Goal: Verbalizes/displays adequate comfort level or baseline comfort level  5/1/2025 1031 by Deonna Mas RN  Outcome: Progressing     Problem: Safety - Adult  Goal: Free from fall injury  5/1/2025 1031 by Doenna Mas RN  Outcome: Progressing  Flowsheets (Taken 4/30/2025 2235 by Velia Zayas RN)  Free From Fall Injury: Instruct family/caregiver on patient safety     Problem: Respiratory - Adult  Goal: Achieves optimal ventilation and oxygenation  5/1/2025 1031 by Deonna Mas RN  Outcome: Progressing     Problem: Cardiovascular - Adult  Goal: Maintains optimal cardiac output and hemodynamic stability  5/1/2025 1031 by Deonna Mas RN  Outcome: Progressing     Problem: Metabolic/Fluid and Electrolytes - Adult  Goal: Electrolytes maintained within normal limits  5/1/2025 1031 by Deonna Mas RN  Outcome: Progressing  Goal: Hemodynamic stability and optimal renal function maintained  5/1/2025 1031 by Deonna Mas RN  Outcome: Progressing  Goal: Glucose maintained within prescribed range  5/1/2025 1031 by Deonna Mas RN  Outcome: Progressing     Problem: Musculoskeletal - Adult  Goal: Return

## 2025-05-02 NOTE — PROGRESS NOTES
Occupational Therapy  OCCUPATIONAL THERAPY INITIAL EVALUATION    [facility]  [address] [place], [place]      Date:2025                                                Patient Name: Wander Rocha  MRN: [id number]  : 1955  Room: [facility]74[facility]    Evaluating OT: Jason Macedo OTR/L #[id number]     Referring Provider: Miracle Wood MD   Specific Provider Orders/Date: OT eval and treat 25    Diagnosis: Tachycardia-bradycardia (HCC) [I49.5]  S/P cardiac pacemaker procedure [Z95.0]  Heart failure with mid-range ejection fraction (HFmEF) (HCC) [I50.22]   Pt admitted to hospital for follow up and management of pacemaker    Surgery / Procedure: 25 Biventricular pacemaker upgrade     Pertinent Medical History:  has a past medical history of RADHA (acute kidney injury), Atrial fibrillation (HCC), Carpal tunnel syndrome, Colorectal polyps, Diverticula of colon, Encounter regarding vascular access for dialysis for ESRD (HCC), Hyperlipidemia, Hypertension, Hypothyroidism, Lung nodule, Lung nodules, Nocturnal hypoxemia due to obesity, Obesity, NIKO (obstructive sleep apnea), Osteoarthritis, Pacemaker, Pacemaker, Pinched nerve, Restrictive lung disease secondary to obesity, S/P laminectomy with spinal fusion, Sinus node dysfunction (HCC), Skin lesion of face, and Type II or unspecified type diabetes mellitus without mention of complication, not stated as uncontrolled.     Precautions:  Fall Risk, pacemaker precautions, O2, continuous pulse ox, + BALL    Assessment of current deficits    [x] Functional mobility  [x]ADLs  [x] Strength               []Cognition    [x] Functional transfers   [x] IADLs         [x] Safety Awareness   [x]Endurance    [] Fine Coordination              [x] Balance      [] Vision/perception   []Sensation     []Gross Motor Coordination  [] ROM  [] Delirium                   [] Motor Control     OT PLAN OF CARE   OT POC based on physician

## 2025-05-02 NOTE — PROGRESS NOTES
Comprehensive Nutrition Assessment    Type and Reason for Visit:  Initial (LOS)    Nutrition Recommendations/Plan:   Recommend and start Gelatein high protein supplement daily and Magic cup supplement daily to help meet nutritional needs.    Noted no fluids on trays at this time.         Malnutrition Assessment:  Malnutrition Status:  At risk for malnutrition (05/02/25 1107)    Context:  Acute Illness     Findings of the 6 clinical characteristics of malnutrition:  Energy Intake:  75% or less of estimated energy requirements for 7 or more days  Weight Loss:  Unable to assess (d/t possible fluid shifts ; hx of CHF)     Body Fat Loss:  Unable to assess     Muscle Mass Loss:  Unable to assess    Fluid Accumulation:  No fluid accumulation     Strength:  Not Performed    Nutrition Assessment:    Patients po intake has been sporadic, averaging ~50% of meals served ; noted no fluids on trays at this time ; pt adm s/p upgrade of a Medtronic dual chamber pacemaker to Bi-V pacemaker on 4/23 ; noted persistent atrial fibrillation and Nonsustained ventricular tachycardia ; RADHA on CKD ; hx of DM and COPD ; hx of CHF/cardiomyopathy/chronic anemia/hypothyroidism/depression/gout  ; will provide recommendations    Nutrition Related Findings:    +I&Os (+1.8 L), 1-3+ edema, missing teeth, A&O x 4, active BS, hyperglycemia, obesity, hyponatremia ; Wound Type: Surgical Incision (Incision x 1)       Current Nutrition Intake & Therapies:    Average Meal Intake: 26-50%, 51-75% (~50%)     ADULT DIET; Regular; Low Fat/Low Chol/High Fiber/2 gm Na; No Fluids on Tray    Anthropometric Measures:  Height: 170.2 cm (5' 7\")  Ideal Body Weight (IBW): 148 lbs (67 kg)    Admission Body Weight: 99.3 kg (219 lb) (4/25, standing scale ; admission weight)  Current Body Weight: 99.3 kg (219 lb) (4/25, standing scale ; admission weight), 148 % IBW. Weight Source: Standing scale  Current BMI (kg/m2): 34.3  Usual Body Weight:  (UTO ; EMR shows past

## 2025-05-03 ENCOUNTER — APPOINTMENT (OUTPATIENT)
Dept: GENERAL RADIOLOGY | Age: 70
DRG: 242 | End: 2025-05-03
Attending: INTERNAL MEDICINE
Payer: MEDICARE

## 2025-05-03 LAB
ALBUMIN SERPL-MCNC: 3.8 G/DL (ref 3.5–5.2)
ALP SERPL-CCNC: 136 U/L (ref 40–129)
ALT SERPL-CCNC: 24 U/L (ref 0–50)
ANION GAP SERPL CALCULATED.3IONS-SCNC: 13 MMOL/L (ref 7–16)
ANION GAP SERPL CALCULATED.3IONS-SCNC: 14 MMOL/L (ref 7–16)
AST SERPL-CCNC: 33 U/L (ref 0–50)
BASOPHILS # BLD: 0.05 K/UL (ref 0–0.2)
BASOPHILS NFR BLD: 1 % (ref 0–2)
BILIRUB DIRECT SERPL-MCNC: 0.5 MG/DL (ref 0–0.2)
BILIRUB INDIRECT SERPL-MCNC: 0.4 MG/DL (ref 0–1)
BILIRUB SERPL-MCNC: 0.9 MG/DL (ref 0–1.2)
BILIRUB UR QL STRIP: NEGATIVE
BUN SERPL-MCNC: 68 MG/DL (ref 8–23)
BUN SERPL-MCNC: 68 MG/DL (ref 8–23)
CALCIUM SERPL-MCNC: 8.7 MG/DL (ref 8.8–10.2)
CALCIUM SERPL-MCNC: 8.9 MG/DL (ref 8.8–10.2)
CHLORIDE SERPL-SCNC: 91 MMOL/L (ref 98–107)
CHLORIDE SERPL-SCNC: 92 MMOL/L (ref 98–107)
CLARITY UR: CLEAR
CO2 SERPL-SCNC: 26 MMOL/L (ref 22–29)
CO2 SERPL-SCNC: 26 MMOL/L (ref 22–29)
COLOR UR: YELLOW
CREAT SERPL-MCNC: 1.7 MG/DL (ref 0.7–1.2)
CREAT SERPL-MCNC: 1.7 MG/DL (ref 0.7–1.2)
EOSINOPHIL # BLD: 0.48 K/UL (ref 0.05–0.5)
EOSINOPHILS RELATIVE PERCENT: 6 % (ref 0–6)
ERYTHROCYTE [DISTWIDTH] IN BLOOD BY AUTOMATED COUNT: 16.9 % (ref 11.5–15)
GFR, ESTIMATED: 43 ML/MIN/1.73M2
GFR, ESTIMATED: 44 ML/MIN/1.73M2
GLUCOSE BLD-MCNC: 125 MG/DL (ref 74–99)
GLUCOSE BLD-MCNC: 133 MG/DL (ref 74–99)
GLUCOSE BLD-MCNC: 186 MG/DL (ref 74–99)
GLUCOSE BLD-MCNC: 256 MG/DL (ref 74–99)
GLUCOSE SERPL-MCNC: 164 MG/DL (ref 74–99)
GLUCOSE SERPL-MCNC: 169 MG/DL (ref 74–99)
GLUCOSE UR STRIP-MCNC: NEGATIVE MG/DL
HCT VFR BLD AUTO: 28.2 % (ref 37–54)
HGB BLD-MCNC: 9.2 G/DL (ref 12.5–16.5)
HGB UR QL STRIP.AUTO: NEGATIVE
IMM GRANULOCYTES # BLD AUTO: 0.13 K/UL (ref 0–0.58)
IMM GRANULOCYTES NFR BLD: 2 % (ref 0–5)
KETONES UR STRIP-MCNC: NEGATIVE MG/DL
LEUKOCYTE ESTERASE UR QL STRIP: NEGATIVE
LYMPHOCYTES NFR BLD: 0.58 K/UL (ref 1.5–4)
LYMPHOCYTES RELATIVE PERCENT: 7 % (ref 20–42)
MAGNESIUM SERPL-MCNC: 2 MG/DL (ref 1.6–2.4)
MCH RBC QN AUTO: 31.4 PG (ref 26–35)
MCHC RBC AUTO-ENTMCNC: 32.6 G/DL (ref 32–34.5)
MCV RBC AUTO: 96.2 FL (ref 80–99.9)
MONOCYTES NFR BLD: 0.88 K/UL (ref 0.1–0.95)
MONOCYTES NFR BLD: 11 % (ref 2–12)
NEUTROPHILS NFR BLD: 74 % (ref 43–80)
NEUTS SEG NFR BLD: 6.02 K/UL (ref 1.8–7.3)
NITRITE UR QL STRIP: NEGATIVE
PH UR STRIP: 6 [PH] (ref 5–8)
PLATELET # BLD AUTO: 211 K/UL (ref 130–450)
PMV BLD AUTO: 8.9 FL (ref 7–12)
POTASSIUM SERPL-SCNC: 4.5 MMOL/L (ref 3.5–5.1)
POTASSIUM SERPL-SCNC: 4.6 MMOL/L (ref 3.5–5.1)
PROT SERPL-MCNC: 7.3 G/DL (ref 6.4–8.3)
PROT UR STRIP-MCNC: NEGATIVE MG/DL
RBC # BLD AUTO: 2.93 M/UL (ref 3.8–5.8)
RBC # BLD: ABNORMAL 10*6/UL
RBC #/AREA URNS HPF: NORMAL /HPF
SODIUM SERPL-SCNC: 130 MMOL/L (ref 136–145)
SODIUM SERPL-SCNC: 131 MMOL/L (ref 136–145)
SP GR UR STRIP: 1.01 (ref 1–1.03)
UROBILINOGEN UR STRIP-ACNC: 0.2 EU/DL (ref 0–1)
WBC #/AREA URNS HPF: NORMAL /HPF
WBC OTHER # BLD: 8.1 K/UL (ref 4.5–11.5)

## 2025-05-03 PROCEDURE — 80048 BASIC METABOLIC PNL TOTAL CA: CPT

## 2025-05-03 PROCEDURE — 6370000000 HC RX 637 (ALT 250 FOR IP): Performed by: NURSE PRACTITIONER

## 2025-05-03 PROCEDURE — 6370000000 HC RX 637 (ALT 250 FOR IP): Performed by: INTERNAL MEDICINE

## 2025-05-03 PROCEDURE — 2580000003 HC RX 258

## 2025-05-03 PROCEDURE — 6360000002 HC RX W HCPCS: Performed by: STUDENT IN AN ORGANIZED HEALTH CARE EDUCATION/TRAINING PROGRAM

## 2025-05-03 PROCEDURE — P9047 ALBUMIN (HUMAN), 25%, 50ML: HCPCS | Performed by: STUDENT IN AN ORGANIZED HEALTH CARE EDUCATION/TRAINING PROGRAM

## 2025-05-03 PROCEDURE — 6360000002 HC RX W HCPCS

## 2025-05-03 PROCEDURE — 80053 COMPREHEN METABOLIC PANEL: CPT

## 2025-05-03 PROCEDURE — 2580000003 HC RX 258: Performed by: STUDENT IN AN ORGANIZED HEALTH CARE EDUCATION/TRAINING PROGRAM

## 2025-05-03 PROCEDURE — 2500000003 HC RX 250 WO HCPCS: Performed by: INTERNAL MEDICINE

## 2025-05-03 PROCEDURE — 81001 URINALYSIS AUTO W/SCOPE: CPT

## 2025-05-03 PROCEDURE — 82962 GLUCOSE BLOOD TEST: CPT

## 2025-05-03 PROCEDURE — 85025 COMPLETE CBC W/AUTO DIFF WBC: CPT

## 2025-05-03 PROCEDURE — 83735 ASSAY OF MAGNESIUM: CPT

## 2025-05-03 PROCEDURE — 51798 US URINE CAPACITY MEASURE: CPT

## 2025-05-03 PROCEDURE — 82248 BILIRUBIN DIRECT: CPT

## 2025-05-03 PROCEDURE — 2060000000 HC ICU INTERMEDIATE R&B

## 2025-05-03 PROCEDURE — 2700000000 HC OXYGEN THERAPY PER DAY

## 2025-05-03 PROCEDURE — 71045 X-RAY EXAM CHEST 1 VIEW: CPT

## 2025-05-03 PROCEDURE — 36415 COLL VENOUS BLD VENIPUNCTURE: CPT

## 2025-05-03 PROCEDURE — 6370000000 HC RX 637 (ALT 250 FOR IP): Performed by: STUDENT IN AN ORGANIZED HEALTH CARE EDUCATION/TRAINING PROGRAM

## 2025-05-03 RX ORDER — METOLAZONE 5 MG/1
5 TABLET ORAL ONCE
Status: COMPLETED | OUTPATIENT
Start: 2025-05-03 | End: 2025-05-03

## 2025-05-03 RX ORDER — ALBUMIN (HUMAN) 12.5 G/50ML
25 SOLUTION INTRAVENOUS EVERY 8 HOURS
Status: COMPLETED | OUTPATIENT
Start: 2025-05-03 | End: 2025-05-04

## 2025-05-03 RX ORDER — METOLAZONE 2.5 MG/1
2.5 TABLET ORAL ONCE
Status: DISCONTINUED | OUTPATIENT
Start: 2025-05-03 | End: 2025-05-03

## 2025-05-03 RX ADMIN — SODIUM CHLORIDE, PRESERVATIVE FREE 10 ML: 5 INJECTION INTRAVENOUS at 08:28

## 2025-05-03 RX ADMIN — DOXYCYCLINE HYCLATE 100 MG: 100 CAPSULE ORAL at 08:24

## 2025-05-03 RX ADMIN — APIXABAN 5 MG: 5 TABLET, FILM COATED ORAL at 08:24

## 2025-05-03 RX ADMIN — ASPIRIN 81 MG: 81 TABLET, COATED ORAL at 08:24

## 2025-05-03 RX ADMIN — SENNOSIDES 8.6 MG: 8.6 TABLET, COATED ORAL at 19:41

## 2025-05-03 RX ADMIN — DOXYCYCLINE HYCLATE 100 MG: 100 CAPSULE ORAL at 19:41

## 2025-05-03 RX ADMIN — BUMETANIDE 0.5 MG/HR: 0.25 INJECTION INTRAMUSCULAR; INTRAVENOUS at 02:29

## 2025-05-03 RX ADMIN — SODIUM CHLORIDE, PRESERVATIVE FREE 10 ML: 5 INJECTION INTRAVENOUS at 19:41

## 2025-05-03 RX ADMIN — ALLOPURINOL 200 MG: 100 TABLET ORAL at 08:24

## 2025-05-03 RX ADMIN — Medication 2000 UNITS: at 08:24

## 2025-05-03 RX ADMIN — INSULIN LISPRO 1 UNITS: 100 INJECTION, SOLUTION INTRAVENOUS; SUBCUTANEOUS at 19:45

## 2025-05-03 RX ADMIN — ALBUMIN (HUMAN) 25 G: 0.25 INJECTION, SOLUTION INTRAVENOUS at 11:24

## 2025-05-03 RX ADMIN — INSULIN LISPRO 2 UNITS: 100 INJECTION, SOLUTION INTRAVENOUS; SUBCUTANEOUS at 11:20

## 2025-05-03 RX ADMIN — ALBUMIN (HUMAN) 25 G: 0.25 INJECTION, SOLUTION INTRAVENOUS at 18:50

## 2025-05-03 RX ADMIN — METOPROLOL SUCCINATE 25 MG: 25 TABLET, EXTENDED RELEASE ORAL at 08:24

## 2025-05-03 RX ADMIN — ATORVASTATIN CALCIUM 40 MG: 40 TABLET, FILM COATED ORAL at 08:24

## 2025-05-03 RX ADMIN — METOLAZONE 5 MG: 5 TABLET ORAL at 14:49

## 2025-05-03 RX ADMIN — SERTRALINE HYDROCHLORIDE 50 MG: 50 TABLET ORAL at 08:24

## 2025-05-03 RX ADMIN — BUMETANIDE 1 MG/HR: 0.25 INJECTION INTRAMUSCULAR; INTRAVENOUS at 19:43

## 2025-05-03 RX ADMIN — INSULIN GLARGINE 14 UNITS: 100 INJECTION, SOLUTION SUBCUTANEOUS at 19:41

## 2025-05-03 RX ADMIN — HYDROCODONE BITARTRATE AND ACETAMINOPHEN 1 TABLET: 5; 325 TABLET ORAL at 22:53

## 2025-05-03 RX ADMIN — APIXABAN 5 MG: 5 TABLET, FILM COATED ORAL at 19:41

## 2025-05-03 RX ADMIN — LEVOTHYROXINE SODIUM 150 MCG: 0.1 TABLET ORAL at 06:03

## 2025-05-03 ASSESSMENT — PAIN SCALES - WONG BAKER: WONGBAKER_NUMERICALRESPONSE: NO HURT

## 2025-05-03 ASSESSMENT — PAIN DESCRIPTION - DESCRIPTORS: DESCRIPTORS: ACHING;DISCOMFORT

## 2025-05-03 ASSESSMENT — PAIN DESCRIPTION - LOCATION: LOCATION: BACK

## 2025-05-03 ASSESSMENT — PAIN SCALES - GENERAL
PAINLEVEL_OUTOF10: 0
PAINLEVEL_OUTOF10: 9

## 2025-05-03 ASSESSMENT — PAIN DESCRIPTION - ORIENTATION: ORIENTATION: LOWER

## 2025-05-03 NOTE — PLAN OF CARE
Problem: Chronic Conditions and Co-morbidities  Goal: Patient's chronic conditions and co-morbidity symptoms are monitored and maintained or improved  5/3/2025 1514 by Zulema Vogt RN  Outcome: Progressing     Problem: Discharge Planning  Goal: Discharge to home or other facility with appropriate resources  5/3/2025 1514 by Zulema Vogt RN  Outcome: Progressing     Problem: ABCDS Injury Assessment  Goal: Absence of physical injury  5/3/2025 1514 by Zulema Vogt RN  Outcome: Progressing     Problem: Pain  Goal: Verbalizes/displays adequate comfort level or baseline comfort level  Outcome: Progressing     Problem: Safety - Adult  Goal: Free from fall injury  Outcome: Progressing     Problem: Respiratory - Adult  Goal: Achieves optimal ventilation and oxygenation  Outcome: Progressing     Problem: Cardiovascular - Adult  Goal: Maintains optimal cardiac output and hemodynamic stability  Outcome: Progressing     Problem: Metabolic/Fluid and Electrolytes - Adult  Goal: Electrolytes maintained within normal limits  Outcome: Progressing     Problem: Metabolic/Fluid and Electrolytes - Adult  Goal: Hemodynamic stability and optimal renal function maintained  Outcome: Progressing     Problem: Metabolic/Fluid and Electrolytes - Adult  Goal: Glucose maintained within prescribed range  Outcome: Progressing     Problem: Musculoskeletal - Adult  Goal: Return mobility to safest level of function  Outcome: Progressing     Problem: Musculoskeletal - Adult  Goal: Maintain proper alignment of affected body part  Outcome: Progressing     Problem: Musculoskeletal - Adult  Goal: Return ADL status to a safe level of function  Outcome: Progressing     Problem: Gastrointestinal - Adult  Goal: Minimal or absence of nausea and vomiting  Outcome: Progressing     Problem: Gastrointestinal - Adult  Goal: Maintains or returns to baseline bowel function  Outcome: Progressing     Problem: Gastrointestinal - Adult  Goal: Maintains

## 2025-05-03 NOTE — PROGRESS NOTES
[facility]  Internal Medicine Residency Program  Progress Note - House Team 1    Patient:  Wander Rocha 69 y.o. male MRN: [id number]     Date of Service: 5/3/2025     CC: Transferred to  house team to service on/30/25 after upgrading his pacemaker to a CRT device  Overnight events: No overnight events    Subjective     Patient was seen and examined at bedside. Reports feeling good, he was seen saturating well on his baseline oxygen requirements. Patient continues to be severely volume overloaded.     Objective     Physical Exam:  Vitals: BP (!) 123/59   Pulse (!) 105   Temp 97.4 °F (36.3 °C) (Temporal)   Resp 18   Ht 1.702 m (5' 7\")   Wt 103.9 kg (229 lb)   SpO2 93%   BMI 35.87 kg/m²     I & O - 24hr: I/O this shift:  In: -   Out: 225 [Urine:225]   General Appearance: alert, appears stated age, and cooperative.  Patient is severely volume overloaded  HEENT:  Head: Normocephalic, no lesions, without obvious abnormality.  Neck: thyroid not enlarged, symmetric, no tenderness/mass/nodules  Lung: rales bilaterally  Heart: irregular heart rhythm and normal heart rate.   Abdomen: Abdomen is distended and nontender  Extremities:   There is 4+ edema on bilateral lower extremities , there is sacral edema  Musculokeletal: No joint swelling, no muscle tenderness. ROM normal in all joints of extremities.   Neurologic: Mental status: Alert, oriented, thought content appropriate  Subject  Pertinent Labs & Imaging Studies   carmela  CBC:   Lab Results   Component Value Date/Time    WBC 8.1 05/03/2025 08:33 AM    RBC 2.93 05/03/2025 08:33 AM    HGB 9.2 05/03/2025 08:33 AM    HCT 28.2 05/03/2025 08:33 AM    MCV 96.2 05/03/2025 08:33 AM    MCH 31.4 05/03/2025 08:33 AM    MCHC 32.6 05/03/2025 08:33 AM    RDW 16.9 05/03/2025 08:33 AM     05/03/2025 08:33 AM    MPV 8.9 05/03/2025 08:33 AM     CMP:    Lab Results   Component Value Date/Time     05/03/2025 08:33 AM    K 4.5 05/03/2025 08:33 AM    K

## 2025-05-03 NOTE — PROGRESS NOTES
Department of Internal Medicine  Nephrology Progress Note    Events reviewed.    SUBJECTIVE: We are following Wander Rocha for RADHA. Patient reports no complaints.     PHYSICAL EXAM:      Vitals:    VITALS:  BP (!) 123/59   Pulse (!) 105   Temp 97.4 °F (36.3 °C) (Temporal)   Resp 18   Ht 1.702 m (5' 7\")   Wt 103.9 kg (229 lb)   SpO2 93%   BMI 35.87 kg/m²   24HR INTAKE/OUTPUT:    Intake/Output Summary (Last 24 hours) at 5/3/2025 0842  Last data filed at 5/2/2025 2240  Gross per 24 hour   Intake 252.59 ml   Output 755 ml   Net -502.41 ml     Scheduled Meds:   apixaban  5 mg Oral BID    insulin glargine  14 Units SubCUTAneous Nightly    insulin lispro  0-4 Units SubCUTAneous 4x Daily AC & HS    polyethylene glycol  17 g Oral Daily    senna  1 tablet Oral Nightly    doxycycline hyclate  100 mg Oral 2 times per day    vitamin D  2,000 Units Oral Daily    aspirin  81 mg Oral Daily    ferrous sulfate  325 mg Oral Every Other Day    atorvastatin  40 mg Oral Daily    metoprolol succinate  25 mg Oral Daily    sertraline  50 mg Oral Daily    allopurinol  200 mg Oral Daily    [Held by provider] lisinopril  5 mg Oral Daily    sodium chloride flush  5-40 mL IntraVENous 2 times per day    levothyroxine  150 mcg Oral Daily     Continuous Infusions:   bumetanide (BUMEX) 12.5 mg in sodium chloride 0.9 % 125 mL infusion 0.5 mg/hr (05/03/25 0229)    dextrose      sodium chloride       PRN Meds:.HYDROcodone 5 mg - acetaminophen, glucose, dextrose bolus **OR** dextrose bolus, glucagon (rDNA), dextrose, sodium chloride flush, sodium chloride, ondansetron, acetaminophen     Constitutional:  Awake, alert, oriented, in NAD  HEENT:  PERRLA, normocephalic, atraumatic  Respiratory: Diminished  Cardiovascular/Edema:  RRR, normal S1, normal S2  Gastrointestinal:  Soft, flat, non-distended, non-tender  Neurologic:  Nonfocal  Skin:  warm, dry, no rashes, no lesions    DATA:    CBC:   Lab Results   Component Value Date/Time    WBC 8.1

## 2025-05-04 LAB
ANION GAP SERPL CALCULATED.3IONS-SCNC: 14 MMOL/L (ref 7–16)
ANION GAP SERPL CALCULATED.3IONS-SCNC: 15 MMOL/L (ref 7–16)
BASOPHILS # BLD: 0.04 K/UL (ref 0–0.2)
BASOPHILS NFR BLD: 1 % (ref 0–2)
BUN SERPL-MCNC: 72 MG/DL (ref 8–23)
BUN SERPL-MCNC: 74 MG/DL (ref 8–23)
CALCIUM SERPL-MCNC: 9.2 MG/DL (ref 8.8–10.2)
CALCIUM SERPL-MCNC: 9.3 MG/DL (ref 8.8–10.2)
CHLORIDE SERPL-SCNC: 93 MMOL/L (ref 98–107)
CHLORIDE SERPL-SCNC: 94 MMOL/L (ref 98–107)
CO2 SERPL-SCNC: 27 MMOL/L (ref 22–29)
CO2 SERPL-SCNC: 28 MMOL/L (ref 22–29)
CREAT SERPL-MCNC: 1.7 MG/DL (ref 0.7–1.2)
CREAT SERPL-MCNC: 1.8 MG/DL (ref 0.7–1.2)
EOSINOPHIL # BLD: 0.35 K/UL (ref 0.05–0.5)
EOSINOPHILS RELATIVE PERCENT: 5 % (ref 0–6)
ERYTHROCYTE [DISTWIDTH] IN BLOOD BY AUTOMATED COUNT: 16.7 % (ref 11.5–15)
GFR, ESTIMATED: 40 ML/MIN/1.73M2
GFR, ESTIMATED: 43 ML/MIN/1.73M2
GLUCOSE BLD-MCNC: 108 MG/DL (ref 74–99)
GLUCOSE BLD-MCNC: 177 MG/DL (ref 74–99)
GLUCOSE BLD-MCNC: 221 MG/DL (ref 74–99)
GLUCOSE SERPL-MCNC: 145 MG/DL (ref 74–99)
GLUCOSE SERPL-MCNC: 160 MG/DL (ref 74–99)
HCT VFR BLD AUTO: 29 % (ref 37–54)
HGB BLD-MCNC: 9.1 G/DL (ref 12.5–16.5)
IMM GRANULOCYTES # BLD AUTO: 0.11 K/UL (ref 0–0.58)
IMM GRANULOCYTES NFR BLD: 2 % (ref 0–5)
LYMPHOCYTES NFR BLD: 0.55 K/UL (ref 1.5–4)
LYMPHOCYTES RELATIVE PERCENT: 7 % (ref 20–42)
MAGNESIUM SERPL-MCNC: 1.9 MG/DL (ref 1.6–2.4)
MCH RBC QN AUTO: 30.2 PG (ref 26–35)
MCHC RBC AUTO-ENTMCNC: 31.4 G/DL (ref 32–34.5)
MCV RBC AUTO: 96.3 FL (ref 80–99.9)
MONOCYTES NFR BLD: 0.79 K/UL (ref 0.1–0.95)
MONOCYTES NFR BLD: 11 % (ref 2–12)
NEUTROPHILS NFR BLD: 75 % (ref 43–80)
NEUTS SEG NFR BLD: 5.58 K/UL (ref 1.8–7.3)
PLATELET # BLD AUTO: 197 K/UL (ref 130–450)
PMV BLD AUTO: 9 FL (ref 7–12)
POTASSIUM SERPL-SCNC: 4 MMOL/L (ref 3.5–5.1)
POTASSIUM SERPL-SCNC: 4.3 MMOL/L (ref 3.5–5.1)
RBC # BLD AUTO: 3.01 M/UL (ref 3.8–5.8)
RBC # BLD: ABNORMAL 10*6/UL
SODIUM SERPL-SCNC: 134 MMOL/L (ref 136–145)
SODIUM SERPL-SCNC: 135 MMOL/L (ref 136–145)
WBC OTHER # BLD: 7.4 K/UL (ref 4.5–11.5)

## 2025-05-04 PROCEDURE — 36415 COLL VENOUS BLD VENIPUNCTURE: CPT

## 2025-05-04 PROCEDURE — P9047 ALBUMIN (HUMAN), 25%, 50ML: HCPCS | Performed by: STUDENT IN AN ORGANIZED HEALTH CARE EDUCATION/TRAINING PROGRAM

## 2025-05-04 PROCEDURE — 83735 ASSAY OF MAGNESIUM: CPT

## 2025-05-04 PROCEDURE — 2060000000 HC ICU INTERMEDIATE R&B

## 2025-05-04 PROCEDURE — 6360000002 HC RX W HCPCS: Performed by: STUDENT IN AN ORGANIZED HEALTH CARE EDUCATION/TRAINING PROGRAM

## 2025-05-04 PROCEDURE — 6370000000 HC RX 637 (ALT 250 FOR IP): Performed by: STUDENT IN AN ORGANIZED HEALTH CARE EDUCATION/TRAINING PROGRAM

## 2025-05-04 PROCEDURE — 2580000003 HC RX 258: Performed by: STUDENT IN AN ORGANIZED HEALTH CARE EDUCATION/TRAINING PROGRAM

## 2025-05-04 PROCEDURE — 80048 BASIC METABOLIC PNL TOTAL CA: CPT

## 2025-05-04 PROCEDURE — 2500000003 HC RX 250 WO HCPCS: Performed by: INTERNAL MEDICINE

## 2025-05-04 PROCEDURE — 82962 GLUCOSE BLOOD TEST: CPT

## 2025-05-04 PROCEDURE — 6370000000 HC RX 637 (ALT 250 FOR IP): Performed by: INTERNAL MEDICINE

## 2025-05-04 PROCEDURE — 6370000000 HC RX 637 (ALT 250 FOR IP): Performed by: NURSE PRACTITIONER

## 2025-05-04 PROCEDURE — 2700000000 HC OXYGEN THERAPY PER DAY

## 2025-05-04 PROCEDURE — 6370000000 HC RX 637 (ALT 250 FOR IP)

## 2025-05-04 PROCEDURE — 85025 COMPLETE CBC W/AUTO DIFF WBC: CPT

## 2025-05-04 RX ORDER — METOLAZONE 5 MG/1
5 TABLET ORAL ONCE
Status: DISCONTINUED | OUTPATIENT
Start: 2025-05-04 | End: 2025-05-04

## 2025-05-04 RX ORDER — METOLAZONE 5 MG/1
5 TABLET ORAL ONCE
Status: COMPLETED | OUTPATIENT
Start: 2025-05-04 | End: 2025-05-04

## 2025-05-04 RX ADMIN — SERTRALINE HYDROCHLORIDE 50 MG: 50 TABLET ORAL at 08:59

## 2025-05-04 RX ADMIN — Medication 2000 UNITS: at 08:59

## 2025-05-04 RX ADMIN — BUMETANIDE 1 MG/HR: 0.25 INJECTION INTRAMUSCULAR; INTRAVENOUS at 11:28

## 2025-05-04 RX ADMIN — HYDROCODONE BITARTRATE AND ACETAMINOPHEN 1 TABLET: 5; 325 TABLET ORAL at 08:58

## 2025-05-04 RX ADMIN — SODIUM CHLORIDE, PRESERVATIVE FREE 10 ML: 5 INJECTION INTRAVENOUS at 20:45

## 2025-05-04 RX ADMIN — APIXABAN 5 MG: 5 TABLET, FILM COATED ORAL at 08:59

## 2025-05-04 RX ADMIN — DOXYCYCLINE HYCLATE 100 MG: 100 CAPSULE ORAL at 20:45

## 2025-05-04 RX ADMIN — METOLAZONE 5 MG: 5 TABLET ORAL at 13:08

## 2025-05-04 RX ADMIN — ASPIRIN 81 MG: 81 TABLET, COATED ORAL at 08:59

## 2025-05-04 RX ADMIN — LEVOTHYROXINE SODIUM 150 MCG: 0.1 TABLET ORAL at 05:55

## 2025-05-04 RX ADMIN — FERROUS SULFATE TAB 325 MG (65 MG ELEMENTAL FE) 325 MG: 325 (65 FE) TAB at 09:00

## 2025-05-04 RX ADMIN — APIXABAN 5 MG: 5 TABLET, FILM COATED ORAL at 20:45

## 2025-05-04 RX ADMIN — BUMETANIDE 1 MG/HR: 0.25 INJECTION INTRAMUSCULAR; INTRAVENOUS at 23:08

## 2025-05-04 RX ADMIN — HYDROCODONE BITARTRATE AND ACETAMINOPHEN 1 TABLET: 5; 325 TABLET ORAL at 18:52

## 2025-05-04 RX ADMIN — ATORVASTATIN CALCIUM 40 MG: 40 TABLET, FILM COATED ORAL at 08:59

## 2025-05-04 RX ADMIN — SENNOSIDES 8.6 MG: 8.6 TABLET, COATED ORAL at 20:45

## 2025-05-04 RX ADMIN — INSULIN GLARGINE 14 UNITS: 100 INJECTION, SOLUTION SUBCUTANEOUS at 20:45

## 2025-05-04 RX ADMIN — DOXYCYCLINE HYCLATE 100 MG: 100 CAPSULE ORAL at 08:59

## 2025-05-04 RX ADMIN — SODIUM CHLORIDE, PRESERVATIVE FREE 10 ML: 5 INJECTION INTRAVENOUS at 09:07

## 2025-05-04 RX ADMIN — ALBUMIN (HUMAN) 25 G: 0.25 INJECTION, SOLUTION INTRAVENOUS at 02:36

## 2025-05-04 RX ADMIN — INSULIN LISPRO 1 UNITS: 100 INJECTION, SOLUTION INTRAVENOUS; SUBCUTANEOUS at 11:30

## 2025-05-04 RX ADMIN — METOPROLOL SUCCINATE 25 MG: 25 TABLET, EXTENDED RELEASE ORAL at 08:59

## 2025-05-04 RX ADMIN — ALLOPURINOL 200 MG: 100 TABLET ORAL at 08:59

## 2025-05-04 ASSESSMENT — PAIN DESCRIPTION - LOCATION
LOCATION: GENERALIZED
LOCATION: BACK

## 2025-05-04 ASSESSMENT — PAIN DESCRIPTION - DESCRIPTORS
DESCRIPTORS: ACHING;DISCOMFORT;SORE
DESCRIPTORS: ACHING;BURNING;SORE

## 2025-05-04 ASSESSMENT — PAIN SCALES - GENERAL
PAINLEVEL_OUTOF10: 5
PAINLEVEL_OUTOF10: 6
PAINLEVEL_OUTOF10: 8
PAINLEVEL_OUTOF10: 2
PAINLEVEL_OUTOF10: 9

## 2025-05-04 ASSESSMENT — PAIN DESCRIPTION - ORIENTATION: ORIENTATION: LOWER

## 2025-05-04 NOTE — PROGRESS NOTES
Department of Internal Medicine  Nephrology Progress Note    Events reviewed.    SUBJECTIVE: We are following Wander Tiwarijimena for RADHA. Patient reports no complaints.     PHYSICAL EXAM:      Vitals:    VITALS:  /67   Pulse 63   Temp 97 °F (36.1 °C)   Resp 20   Ht 1.702 m (5' 7\")   Wt 103.9 kg (229 lb)   SpO2 97%   BMI 35.87 kg/m²   24HR INTAKE/OUTPUT:    Intake/Output Summary (Last 24 hours) at 5/4/2025 0844  Last data filed at 5/4/2025 0739  Gross per 24 hour   Intake 180 ml   Output 726 ml   Net -546 ml     Scheduled Meds:   apixaban  5 mg Oral BID    insulin glargine  14 Units SubCUTAneous Nightly    insulin lispro  0-4 Units SubCUTAneous 4x Daily AC & HS    polyethylene glycol  17 g Oral Daily    senna  1 tablet Oral Nightly    doxycycline hyclate  100 mg Oral 2 times per day    vitamin D  2,000 Units Oral Daily    aspirin  81 mg Oral Daily    ferrous sulfate  325 mg Oral Every Other Day    atorvastatin  40 mg Oral Daily    metoprolol succinate  25 mg Oral Daily    sertraline  50 mg Oral Daily    allopurinol  200 mg Oral Daily    [Held by provider] lisinopril  5 mg Oral Daily    sodium chloride flush  5-40 mL IntraVENous 2 times per day    levothyroxine  150 mcg Oral Daily     Continuous Infusions:   bumetanide (BUMEX) 12.5 mg in sodium chloride 0.9 % 125 mL infusion 1 mg/hr (05/03/25 1943)    dextrose      sodium chloride       PRN Meds:.HYDROcodone 5 mg - acetaminophen, glucose, dextrose bolus **OR** dextrose bolus, glucagon (rDNA), dextrose, sodium chloride flush, sodium chloride, ondansetron, acetaminophen     Constitutional:  Awake, alert, oriented, in NAD  HEENT:  PERRLA, normocephalic, atraumatic  Respiratory: Diminished  Cardiovascular/Edema:  RRR, normal S1, normal S2  Gastrointestinal:  Soft, flat, non-distended, non-tender  Neurologic:  Nonfocal  Skin:  warm, dry, no rashes, no lesions    DATA:    CBC:   Lab Results   Component Value Date/Time    WBC 8.1 05/03/2025 08:33 AM    RBC 2.93

## 2025-05-04 NOTE — PROGRESS NOTES
Cardiology consult placed via [facility] serve to Dr Vega.  Thelma ROJAS taking messages

## 2025-05-04 NOTE — PLAN OF CARE
Problem: Chronic Conditions and Co-morbidities  Goal: Patient's chronic conditions and co-morbidity symptoms are monitored and maintained or improved  5/4/2025 0332 by Velia Zayas RN  Outcome: Progressing     Problem: Discharge Planning  Goal: Discharge to home or other facility with appropriate resources  5/4/2025 0332 by Velia Zayas RN  Outcome: Progressing     Problem: ABCDS Injury Assessment  Goal: Absence of physical injury  5/4/2025 0332 by Velia Zayas RN  Outcome: Progressing  Flowsheets (Taken 5/3/2025 2126)  Absence of Physical Injury: Implement safety measures based on patient assessment     Problem: Pain  Goal: Verbalizes/displays adequate comfort level or baseline comfort level  5/4/2025 0332 by Velia Zayas RN  Outcome: Progressing     Problem: Safety - Adult  Goal: Free from fall injury  5/4/2025 0332 by Velia Zayas RN  Outcome: Progressing  Flowsheets (Taken 5/3/2025 2126)  Free From Fall Injury: Instruct family/caregiver on patient safety     Problem: Respiratory - Adult  Goal: Achieves optimal ventilation and oxygenation  5/4/2025 0332 by Velia Zayas RN  Outcome: Progressing  Flowsheets (Taken 5/3/2025 2000)  Achieves optimal ventilation and oxygenation: Assess for changes in respiratory status     Problem: Cardiovascular - Adult  Goal: Maintains optimal cardiac output and hemodynamic stability  5/4/2025 0332 by Velia Zayas RN  Outcome: Progressing     Problem: Metabolic/Fluid and Electrolytes - Adult  Goal: Electrolytes maintained within normal limits  5/4/2025 0332 by Velia Zayas RN  Outcome: Progressing     Problem: Metabolic/Fluid and Electrolytes - Adult  Goal: Hemodynamic stability and optimal renal function maintained  5/4/2025 0332 by Velia Zayas RN  Outcome: Progressing     Problem: Metabolic/Fluid and Electrolytes - Adult  Goal: Glucose maintained within prescribed range  5/4/2025 0332 by Veila Zayas RN  Outcome:

## 2025-05-04 NOTE — PROGRESS NOTES
[facility]  Internal Medicine Residency Program  Progress Note - House Team 1    Patient:  Wander Rocha 69 y.o. male MRN: [id number]     Date of Service: 5/4/2025     CC: Transferred to  house team to service on/30/25 after upgrading his pacemaker to a CRT device   Overnight events:  No overnight events     Subjective     Patient was seen at bedside this morning. He reports he feels better today. Pain in bilateral lower extremity has improved. He remains volume overloaded with 3+ pitting edema bilaterally on physical examination. Bowel and bladder habits are normal. Appetite is normal. He is on 3 L of O2 by nasal cannula. Denies chest pain, fever, chills, nausea or vomiting.    Objective     Physical Exam:  Vitals: BP (!) 110/57   Pulse 60   Temp 97.5 °F (36.4 °C) (Temporal)   Resp 16   Ht 1.702 m (5' 7\")   Wt 103.9 kg (229 lb)   SpO2 97%   BMI 35.87 kg/m²     I & O - 24hr: I/O this shift:  In: 65 [P.O.:60; I.V.:5]  Out: -    General Appearance: alert, appears stated age, and cooperative  HEENT:  Head: Normocephalic, no lesions, without obvious abnormality.  Neck: no adenopathy, no carotid bruit, no JVD, supple, symmetrical, trachea midline, and thyroid not enlarged, symmetric, no tenderness/mass/nodules  Lung: rales bibasilar and bilaterally  Heart: regular rate and rhythm, S1, S2 normal, no murmur, click, rub or gallop  Abdomen: soft, non-tender; bowel sounds normal; no masses,  no organomegaly  Extremities:  edema 4+ bilateral lower extremities   Musculokeletal: No joint swelling, no muscle tenderness. ROM normal in all joints of extremities.   Neurologic: Mental status: Alert, oriented, thought content appropriate    Subjective  Pertinent Labs & Imaging Studies     CBC with Differential:    Lab Results   Component Value Date/Time    WBC 8.1 05/03/2025 08:33 AM    RBC 2.93 05/03/2025 08:33 AM    HGB 9.2 05/03/2025 08:33 AM    HCT 28.2 05/03/2025 08:33 AM     05/03/2025 08:33

## 2025-05-04 NOTE — PLAN OF CARE
Problem: Chronic Conditions and Co-morbidities  Goal: Patient's chronic conditions and co-morbidity symptoms are monitored and maintained or improved  5/4/2025 1444 by Mary Dixon RN  Outcome: Progressing  5/4/2025 0332 by Velia Zayas RN  Outcome: Progressing     Problem: Discharge Planning  Goal: Discharge to home or other facility with appropriate resources  5/4/2025 1444 by Mary Dixon RN  Outcome: Progressing  5/4/2025 0332 by Velia Zayas RN  Outcome: Progressing

## 2025-05-05 PROBLEM — I38 VHD (VALVULAR HEART DISEASE): Status: ACTIVE | Noted: 2025-05-05

## 2025-05-05 PROBLEM — I50.82 BIVENTRICULAR CHF (CONGESTIVE HEART FAILURE) (HCC): Status: ACTIVE | Noted: 2025-05-05

## 2025-05-05 PROBLEM — I50.811 ACUTE RIGHT-SIDED CHF (CONGESTIVE HEART FAILURE) (HCC): Status: ACTIVE | Noted: 2025-05-05

## 2025-05-05 PROBLEM — I13.10 CARDIORENAL SYNDROME: Status: ACTIVE | Noted: 2025-05-05

## 2025-05-05 PROBLEM — L89.301 PRESSURE INJURY OF BUTTOCK, STAGE 1: Status: ACTIVE | Noted: 2025-05-05

## 2025-05-05 PROBLEM — I50.23 ACUTE ON CHRONIC SYSTOLIC HEART FAILURE (HCC): Status: ACTIVE | Noted: 2023-05-15

## 2025-05-05 LAB
ANION GAP SERPL CALCULATED.3IONS-SCNC: 15 MMOL/L (ref 7–16)
ANION GAP SERPL CALCULATED.3IONS-SCNC: 15 MMOL/L (ref 7–16)
BASOPHILS # BLD: 0 K/UL (ref 0–0.2)
BASOPHILS NFR BLD: 0 % (ref 0–2)
BUN SERPL-MCNC: 78 MG/DL (ref 8–23)
BUN SERPL-MCNC: 79 MG/DL (ref 8–23)
CALCIUM SERPL-MCNC: 9.4 MG/DL (ref 8.8–10.2)
CALCIUM SERPL-MCNC: 9.4 MG/DL (ref 8.8–10.2)
CHLORIDE SERPL-SCNC: 91 MMOL/L (ref 98–107)
CHLORIDE SERPL-SCNC: 94 MMOL/L (ref 98–107)
CO2 SERPL-SCNC: 27 MMOL/L (ref 22–29)
CO2 SERPL-SCNC: 29 MMOL/L (ref 22–29)
CREAT SERPL-MCNC: 1.7 MG/DL (ref 0.7–1.2)
CREAT SERPL-MCNC: 1.9 MG/DL (ref 0.7–1.2)
EOSINOPHIL # BLD: 0.2 K/UL (ref 0.05–0.5)
EOSINOPHILS RELATIVE PERCENT: 3 % (ref 0–6)
ERYTHROCYTE [DISTWIDTH] IN BLOOD BY AUTOMATED COUNT: 16.6 % (ref 11.5–15)
FERRITIN SERPL-MCNC: 112 NG/ML
GFR, ESTIMATED: 37 ML/MIN/1.73M2
GFR, ESTIMATED: 42 ML/MIN/1.73M2
GLUCOSE BLD-MCNC: 135 MG/DL (ref 74–99)
GLUCOSE BLD-MCNC: 154 MG/DL (ref 74–99)
GLUCOSE BLD-MCNC: 174 MG/DL (ref 74–99)
GLUCOSE BLD-MCNC: 184 MG/DL (ref 74–99)
GLUCOSE SERPL-MCNC: 118 MG/DL (ref 74–99)
GLUCOSE SERPL-MCNC: 236 MG/DL (ref 74–99)
HCT VFR BLD AUTO: 27.5 % (ref 37–54)
HGB BLD-MCNC: 9 G/DL (ref 12.5–16.5)
IRON SATN MFR SERPL: 8 % (ref 20–55)
IRON SERPL-MCNC: 28 UG/DL (ref 61–157)
LYMPHOCYTES NFR BLD: 0.68 K/UL (ref 1.5–4)
LYMPHOCYTES RELATIVE PERCENT: 9 % (ref 20–42)
MAGNESIUM SERPL-MCNC: 1.9 MG/DL (ref 1.6–2.4)
MCH RBC QN AUTO: 31.1 PG (ref 26–35)
MCHC RBC AUTO-ENTMCNC: 32.7 G/DL (ref 32–34.5)
MCV RBC AUTO: 95.2 FL (ref 80–99.9)
MONOCYTES NFR BLD: 0.61 K/UL (ref 0.1–0.95)
MONOCYTES NFR BLD: 8 % (ref 2–12)
NEUTROPHILS NFR BLD: 81 % (ref 43–80)
NEUTS SEG NFR BLD: 6.2 K/UL (ref 1.8–7.3)
PLATELET # BLD AUTO: 184 K/UL (ref 130–450)
PMV BLD AUTO: 8.9 FL (ref 7–12)
POTASSIUM SERPL-SCNC: 4 MMOL/L (ref 3.5–5.1)
POTASSIUM SERPL-SCNC: 4.4 MMOL/L (ref 3.5–5.1)
RBC # BLD AUTO: 2.89 M/UL (ref 3.8–5.8)
RBC # BLD: ABNORMAL 10*6/UL
SODIUM SERPL-SCNC: 134 MMOL/L (ref 136–145)
SODIUM SERPL-SCNC: 136 MMOL/L (ref 136–145)
TIBC SERPL-MCNC: 341 UG/DL (ref 250–450)
WBC OTHER # BLD: 7.7 K/UL (ref 4.5–11.5)

## 2025-05-05 PROCEDURE — 6360000002 HC RX W HCPCS: Performed by: STUDENT IN AN ORGANIZED HEALTH CARE EDUCATION/TRAINING PROGRAM

## 2025-05-05 PROCEDURE — 97530 THERAPEUTIC ACTIVITIES: CPT

## 2025-05-05 PROCEDURE — 83735 ASSAY OF MAGNESIUM: CPT

## 2025-05-05 PROCEDURE — 97535 SELF CARE MNGMENT TRAINING: CPT

## 2025-05-05 PROCEDURE — 2580000003 HC RX 258: Performed by: STUDENT IN AN ORGANIZED HEALTH CARE EDUCATION/TRAINING PROGRAM

## 2025-05-05 PROCEDURE — 85025 COMPLETE CBC W/AUTO DIFF WBC: CPT

## 2025-05-05 PROCEDURE — 6370000000 HC RX 637 (ALT 250 FOR IP): Performed by: STUDENT IN AN ORGANIZED HEALTH CARE EDUCATION/TRAINING PROGRAM

## 2025-05-05 PROCEDURE — 36415 COLL VENOUS BLD VENIPUNCTURE: CPT

## 2025-05-05 PROCEDURE — 82962 GLUCOSE BLOOD TEST: CPT

## 2025-05-05 PROCEDURE — 2700000000 HC OXYGEN THERAPY PER DAY

## 2025-05-05 PROCEDURE — 6370000000 HC RX 637 (ALT 250 FOR IP): Performed by: NURSE PRACTITIONER

## 2025-05-05 PROCEDURE — 6370000000 HC RX 637 (ALT 250 FOR IP): Performed by: INTERNAL MEDICINE

## 2025-05-05 PROCEDURE — 82728 ASSAY OF FERRITIN: CPT

## 2025-05-05 PROCEDURE — 51798 US URINE CAPACITY MEASURE: CPT

## 2025-05-05 PROCEDURE — 99223 1ST HOSP IP/OBS HIGH 75: CPT | Performed by: INTERNAL MEDICINE

## 2025-05-05 PROCEDURE — 2060000000 HC ICU INTERMEDIATE R&B

## 2025-05-05 PROCEDURE — 83550 IRON BINDING TEST: CPT

## 2025-05-05 PROCEDURE — 83540 ASSAY OF IRON: CPT

## 2025-05-05 PROCEDURE — APPSS60 APP SPLIT SHARED TIME 46-60 MINUTES

## 2025-05-05 PROCEDURE — 2500000003 HC RX 250 WO HCPCS: Performed by: INTERNAL MEDICINE

## 2025-05-05 PROCEDURE — 99232 SBSQ HOSP IP/OBS MODERATE 35: CPT | Performed by: INTERNAL MEDICINE

## 2025-05-05 PROCEDURE — 80048 BASIC METABOLIC PNL TOTAL CA: CPT

## 2025-05-05 RX ORDER — SPIRONOLACTONE 25 MG/1
25 TABLET ORAL DAILY
Status: COMPLETED | OUTPATIENT
Start: 2025-05-05 | End: 2025-05-07

## 2025-05-05 RX ADMIN — BUMETANIDE 1 MG/HR: 0.25 INJECTION INTRAMUSCULAR; INTRAVENOUS at 12:08

## 2025-05-05 RX ADMIN — POLYETHYLENE GLYCOL 3350 17 G: 17 POWDER, FOR SOLUTION ORAL at 08:58

## 2025-05-05 RX ADMIN — SODIUM CHLORIDE, PRESERVATIVE FREE 10 ML: 5 INJECTION INTRAVENOUS at 08:59

## 2025-05-05 RX ADMIN — INSULIN GLARGINE 14 UNITS: 100 INJECTION, SOLUTION SUBCUTANEOUS at 20:57

## 2025-05-05 RX ADMIN — ASPIRIN 81 MG: 81 TABLET, COATED ORAL at 08:59

## 2025-05-05 RX ADMIN — ATORVASTATIN CALCIUM 40 MG: 40 TABLET, FILM COATED ORAL at 08:59

## 2025-05-05 RX ADMIN — METOPROLOL SUCCINATE 25 MG: 25 TABLET, EXTENDED RELEASE ORAL at 08:59

## 2025-05-05 RX ADMIN — SENNOSIDES 8.6 MG: 8.6 TABLET, COATED ORAL at 20:57

## 2025-05-05 RX ADMIN — DOXYCYCLINE HYCLATE 100 MG: 100 CAPSULE ORAL at 08:59

## 2025-05-05 RX ADMIN — Medication 2000 UNITS: at 08:59

## 2025-05-05 RX ADMIN — LEVOTHYROXINE SODIUM 150 MCG: 0.1 TABLET ORAL at 05:09

## 2025-05-05 RX ADMIN — HYDROCODONE BITARTRATE AND ACETAMINOPHEN 1 TABLET: 5; 325 TABLET ORAL at 20:57

## 2025-05-05 RX ADMIN — SPIRONOLACTONE 25 MG: 25 TABLET ORAL at 14:48

## 2025-05-05 RX ADMIN — APIXABAN 5 MG: 5 TABLET, FILM COATED ORAL at 20:57

## 2025-05-05 RX ADMIN — APIXABAN 5 MG: 5 TABLET, FILM COATED ORAL at 08:59

## 2025-05-05 RX ADMIN — INSULIN LISPRO 1 UNITS: 100 INJECTION, SOLUTION INTRAVENOUS; SUBCUTANEOUS at 11:35

## 2025-05-05 RX ADMIN — ALLOPURINOL 200 MG: 100 TABLET ORAL at 08:59

## 2025-05-05 RX ADMIN — HYDROCODONE BITARTRATE AND ACETAMINOPHEN 1 TABLET: 5; 325 TABLET ORAL at 13:00

## 2025-05-05 RX ADMIN — SERTRALINE HYDROCHLORIDE 50 MG: 50 TABLET ORAL at 08:59

## 2025-05-05 RX ADMIN — DOXYCYCLINE HYCLATE 100 MG: 100 CAPSULE ORAL at 20:57

## 2025-05-05 RX ADMIN — PETROLATUM: 420 OINTMENT TOPICAL at 20:56

## 2025-05-05 RX ADMIN — HYDROCODONE BITARTRATE AND ACETAMINOPHEN 1 TABLET: 5; 325 TABLET ORAL at 02:23

## 2025-05-05 ASSESSMENT — PAIN DESCRIPTION - DESCRIPTORS
DESCRIPTORS: ACHING
DESCRIPTORS: ACHING

## 2025-05-05 ASSESSMENT — PAIN DESCRIPTION - PAIN TYPE
TYPE: CHRONIC PAIN
TYPE: CHRONIC PAIN

## 2025-05-05 ASSESSMENT — PAIN SCALES - GENERAL
PAINLEVEL_OUTOF10: 0
PAINLEVEL_OUTOF10: 6
PAINLEVEL_OUTOF10: 7
PAINLEVEL_OUTOF10: 6
PAINLEVEL_OUTOF10: 3
PAINLEVEL_OUTOF10: 0
PAINLEVEL_OUTOF10: 0
PAINLEVEL_OUTOF10: 4
PAINLEVEL_OUTOF10: 0
PAINLEVEL_OUTOF10: 8

## 2025-05-05 ASSESSMENT — PAIN DESCRIPTION - ORIENTATION
ORIENTATION: RIGHT;LEFT
ORIENTATION: LOWER;POSTERIOR
ORIENTATION: LOWER

## 2025-05-05 ASSESSMENT — PAIN - FUNCTIONAL ASSESSMENT
PAIN_FUNCTIONAL_ASSESSMENT: PREVENTS OR INTERFERES SOME ACTIVE ACTIVITIES AND ADLS
PAIN_FUNCTIONAL_ASSESSMENT: PREVENTS OR INTERFERES WITH MANY ACTIVE NOT PASSIVE ACTIVITIES

## 2025-05-05 ASSESSMENT — PAIN DESCRIPTION - LOCATION
LOCATION: BACK
LOCATION: BACK;NECK
LOCATION: BACK

## 2025-05-05 ASSESSMENT — PAIN DESCRIPTION - FREQUENCY
FREQUENCY: INTERMITTENT
FREQUENCY: INTERMITTENT

## 2025-05-05 ASSESSMENT — PAIN SCALES - WONG BAKER: WONGBAKER_NUMERICALRESPONSE: HURTS WHOLE LOT

## 2025-05-05 ASSESSMENT — PAIN DESCRIPTION - ONSET
ONSET: ON-GOING
ONSET: ON-GOING

## 2025-05-05 NOTE — PROGRESS NOTES
Occupational Therapy  OCCUPATIONAL THERAPY INITIAL EVALUATION    [facility]  [address] [place], [place]      Date:2025                                                Patient Name: Wander Rocha  MRN: [id number]  : 1955  Room: [facility]    Evaluating OT: Jason Macedo OTR/L #[id number]     Referring Provider: Miracle Wood MD   Specific Provider Orders/Date: OT eval and treat 25    Diagnosis: Tachycardia-bradycardia (HCC) [I49.5]  S/P cardiac pacemaker procedure [Z95.0]  Heart failure with mid-range ejection fraction (HFmEF) (HCC) [I50.22]   Pt admitted to hospital for follow up and management of pacemaker    Surgery / Procedure: 25 Biventricular pacemaker upgrade     Pertinent Medical History:  has a past medical history of RADHA (acute kidney injury), Atrial fibrillation (HCC), Carpal tunnel syndrome, Colorectal polyps, Diverticula of colon, Encounter regarding vascular access for dialysis for ESRD (HCC), Hyperlipidemia, Hypertension, Hypothyroidism, Lung nodule, Lung nodules, Nocturnal hypoxemia due to obesity, Obesity, NIKO (obstructive sleep apnea), Osteoarthritis, Pacemaker, Pacemaker, Pinched nerve, Restrictive lung disease secondary to obesity, S/P laminectomy with spinal fusion, Sinus node dysfunction (HCC), Skin lesion of face, and Type II or unspecified type diabetes mellitus without mention of complication, not stated as uncontrolled.     Precautions:  Fall Risk, pacemaker precautions, O2, continuous pulse ox, + BALL    Assessment of current deficits    [x] Functional mobility  [x]ADLs  [x] Strength               []Cognition    [x] Functional transfers   [x] IADLs         [x] Safety Awareness   [x]Endurance    [] Fine Coordination              [x] Balance      [] Vision/perception   []Sensation     []Gross Motor Coordination  [] ROM  [] Delirium                   [] Motor Control     OT PLAN OF CARE   OT POC based on physician

## 2025-05-05 NOTE — CONSULTS
interviewing the patient (HPI, ROS, PMH, PSH, FMH, SH, allergies, and medications)  Independently performing a medically appropriate examination  Reviewing the above documentation completed by the MARY  Ordering medications, tests, and/or procedures  Formulating the assessment/plan and reviewing the rationale for the above recommendations  Reviewing available records, results of all previously ordered testing/procedures, and current problem list  Counseling/educating the patient  Coordinating care with other healthcare professionals  Communicating results to the patient's family/caregiver  Documenting clinical information in the patient's electronic health record  -----------------------------------------------------------------------------------------------------------------------------------------------    Luiz Roy MD, [facility]     NOTE: This report was transcribed using voice recognition software. Every effort was made to ensure accuracy; however, inadvertent computerized transcription errors may be present.        Inpatient Cardiology Consultation      Reason for Consult: Valvular heart disease    Consulting Physician: Dr. Roy     Requesting Physician:  Dr. Moyer     Date of Consultation: 5/5/2025    HISTORY OF PRESENT ILLNESS:   Wander Rocha  is a 69 y.o.  male known to [facility] cardiology Dr. Fontanez last seen in office 4/16/2025 for follow-up of CHF and atrial fibrillation.  Follows with CHF clinic last seen 8/1/2024 resumed Bumex 2 Mg BID. Follows with EP Dr. Pereira for pacemaker in [facility] and persistent afib, for CRT-P upgrade at that time.         Presented 4/23/2025 CRT-P upgrade with Dr. Pereira which was completed 4/24.  CXR revealed pulmonary congestion, was initiated on Bumex drip postprocedure.  Unfortunately noted to have impaired renal function Cr 1.4> 2.6>2.2 and decrease urine output as well as nausea and abdominal distention.  Lisinopril and metoprolol placed on hold.  times daily Changed from basaglar (no longer on formulary). 9/16/24  Yes Oly Crespo MD   aspirin 81 MG EC tablet Take 1 tablet by mouth daily 5/28/24  Yes Oly Crespo MD   ferrous sulfate (IRON 325) 325 (65 Fe) MG tablet Take 1 tablet by mouth every other day 5/28/24  Yes Oly Crespo MD   Cholecalciferol (VITAMIN D) 50 MCG (2000 UT) CAPS capsule Take 1 capsule by mouth daily   Yes ProviderHolly MD   apixaban (ELIQUIS) 5 MG TABS tablet Take 1 tablet by mouth 2 times daily 1/6/25   Oly Crespo MD       Current Medications:    Current Facility-Administered Medications: apixaban (ELIQUIS) tablet 5 mg, 5 mg, Oral, BID  insulin glargine (LANTUS) injection vial 14 Units, 14 Units, SubCUTAneous, Nightly  insulin lispro (HUMALOG,ADMELOG) injection vial 0-4 Units, 0-4 Units, SubCUTAneous, 4x Daily AC & HS  polyethylene glycol (GLYCOLAX) packet 17 g, 17 g, Oral, Daily  senna (SENOKOT) tablet 8.6 mg, 1 tablet, Oral, Nightly  doxycycline hyclate (VIBRAMYCIN) capsule 100 mg, 100 mg, Oral, 2 times per day  bumetanide (BUMEX) 12.5 mg in sodium chloride 0.9 % 125 mL infusion, 1 mg/hr, IntraVENous, Continuous  HYDROcodone-acetaminophen (NORCO) 5-325 MG per tablet 1 tablet, 1 tablet, Oral, Q6H PRN  glucose chewable tablet 16 g, 4 tablet, Oral, PRN  dextrose bolus 10% 125 mL, 125 mL, IntraVENous, PRN **OR** dextrose bolus 10% 250 mL, 250 mL, IntraVENous, PRN  glucagon injection 1 mg, 1 mg, SubCUTAneous, PRN  dextrose 10 % infusion, , IntraVENous, Continuous PRN  vitamin D (CHOLECALCIFEROL) tablet 2,000 Units, 2,000 Units, Oral, Daily  aspirin EC tablet 81 mg, 81 mg, Oral, Daily  ferrous sulfate (IRON 325) tablet 325 mg, 325 mg, Oral, Every Other Day  atorvastatin (LIPITOR) tablet 40 mg, 40 mg, Oral, Daily  metoprolol succinate (TOPROL XL) extended release tablet 25 mg, 25 mg, Oral, Daily  sertraline (ZOLOFT) tablet 50 mg, 50 mg, Oral, Daily  allopurinol (ZYLOPRIM) tablet 200 mg, 200 mg,

## 2025-05-05 NOTE — PLAN OF CARE
Problem: Chronic Conditions and Co-morbidities  Goal: Patient's chronic conditions and co-morbidity symptoms are monitored and maintained or improved  5/4/2025 2152 by Santana Ness RN  Outcome: Progressing  5/4/2025 1444 by Mary Dixon RN  Outcome: Progressing     Problem: Discharge Planning  Goal: Discharge to home or other facility with appropriate resources  5/4/2025 2152 by Santana Ness RN  Outcome: Progressing  5/4/2025 1444 by Mary Dixon RN  Outcome: Progressing     Problem: ABCDS Injury Assessment  Goal: Absence of physical injury  5/4/2025 2152 by Santana Ness RN  Outcome: Progressing  5/4/2025 1444 by Mary Dixon RN  Outcome: Progressing     Problem: Pain  Goal: Verbalizes/displays adequate comfort level or baseline comfort level  5/4/2025 1444 by Mary Dixon RN  Outcome: Progressing     Problem: Safety - Adult  Goal: Free from fall injury  5/4/2025 1444 by Mary Dixon RN  Outcome: Progressing     Problem: Respiratory - Adult  Goal: Achieves optimal ventilation and oxygenation  5/4/2025 1444 by Mary Dixon RN  Outcome: Progressing     Problem: Cardiovascular - Adult  Goal: Maintains optimal cardiac output and hemodynamic stability  5/4/2025 1444 by Mary Dixon RN  Outcome: Progressing     Problem: Metabolic/Fluid and Electrolytes - Adult  Goal: Electrolytes maintained within normal limits  5/4/2025 1444 by Mary Dixon RN  Outcome: Progressing  Goal: Hemodynamic stability and optimal renal function maintained  5/4/2025 1444 by Mary Dixon RN  Outcome: Progressing  Goal: Glucose maintained within prescribed range  5/4/2025 1444 by Mary Dixon RN  Outcome: Progressing     Problem: Musculoskeletal - Adult  Goal: Return mobility to safest level of function  5/4/2025 1444 by Mary Dixon RN  Outcome: Progressing  Goal: Maintain proper alignment of affected body part  5/4/2025 1444 by Mary Dixon RN  Outcome: Progressing  Goal: Return ADL status to a safe level of function  5/4/2025 1444

## 2025-05-05 NOTE — FLOWSHEET NOTE
Inpatient Wound Care (Initial consult) 7415    Admit Date: 4/23/2025  8:25 AM    Reason for consult:  Buttock    Significant history: Per H&P     Subjective: Wander Rocha is seen for follow-up and management of pacemaker in situ and persistent atrial fibrillation.     Findings:     05/05/25 1301   Skin Integumentary    Skin Integrity Ecchymosis   Location BUE, abdomen   Skin Integrity Site 2   Skin Integrity Location 2   (Redness blanchable)   Location 2 right buttock   Skin Integrity Site 3   Skin Integrity Location 3   (Dries scabbed area)    Location 3 right upper buttock   Skin Integrity Site 4   Skin Integrity Location 4   (dry flaky, redness, swelling)   Location 4 BLE       **Informed Consent**    The patient has given verbal consent to have photos taken of buttocks and inserted into their chart as part of their permanent medical record for purposes of documentation, treatment management and/or medical review.   All Images taken on 5/5/25 of patient name: aWnder Rocha were transmitted and stored on secured Epic  Site located within Media Folder Tab by a registered [employer] Mobile Application Device.        Impression:  Skin assessment complete; See above documentation    Plan: Aquaphor  Seat cushion  Patient will need continued preventative care.      Shyanne Mccarty RN 5/5/2025 1:03 PM

## 2025-05-05 NOTE — CARE COORDINATION
Reviewed chart, renal following, pt on bumex drip. New cardio consult. Pt from home with wife, [facility] following([facility] orders are written). PT 16/24 80 ft. Plan is home at at discharge.Lina Horvath, MSW, LSW

## 2025-05-05 NOTE — PROGRESS NOTES
Message sent to Dr. Whiteside via [employer] due to the patient having a poor appetite, recommended dietary supplement..      No new orders obtained.

## 2025-05-05 NOTE — PROGRESS NOTES
Department of Internal Medicine  Nephrology Progress Note    Events reviewed.    SUBJECTIVE: We are following Wander Tiwarijimena for RADHA. Patient reports no complaints.     PHYSICAL EXAM:      Vitals:    VITALS:  /60   Pulse 69   Temp 97 °F (36.1 °C) (Temporal)   Resp 16   Ht 1.702 m (5' 7\")   Wt 98.7 kg (217 lb 9.6 oz)   SpO2 96%   BMI 34.08 kg/m²   24HR INTAKE/OUTPUT:    Intake/Output Summary (Last 24 hours) at 5/5/2025 0947  Last data filed at 5/5/2025 0621  Gross per 24 hour   Intake 531.79 ml   Output 2475 ml   Net -1943.21 ml     Scheduled Meds:   apixaban  5 mg Oral BID    insulin glargine  14 Units SubCUTAneous Nightly    insulin lispro  0-4 Units SubCUTAneous 4x Daily AC & HS    polyethylene glycol  17 g Oral Daily    senna  1 tablet Oral Nightly    doxycycline hyclate  100 mg Oral 2 times per day    vitamin D  2,000 Units Oral Daily    aspirin  81 mg Oral Daily    ferrous sulfate  325 mg Oral Every Other Day    atorvastatin  40 mg Oral Daily    metoprolol succinate  25 mg Oral Daily    sertraline  50 mg Oral Daily    allopurinol  200 mg Oral Daily    [Held by provider] lisinopril  5 mg Oral Daily    sodium chloride flush  5-40 mL IntraVENous 2 times per day    levothyroxine  150 mcg Oral Daily     Continuous Infusions:   bumetanide (BUMEX) 12.5 mg in sodium chloride 0.9 % 125 mL infusion 1 mg/hr (05/04/25 2308)    dextrose      sodium chloride       PRN Meds:.HYDROcodone 5 mg - acetaminophen, glucose, dextrose bolus **OR** dextrose bolus, glucagon (rDNA), dextrose, sodium chloride flush, sodium chloride, ondansetron, acetaminophen     Constitutional:  Awake, alert, oriented, in NAD  HEENT:  PERRLA, normocephalic, atraumatic  Respiratory: Diminished  Cardiovascular/Edema:  RRR, normal S1, normal S2  Gastrointestinal:  Soft, flat, non-distended, non-tender  Neurologic:  Nonfocal  Skin:  warm, dry, no rashes, no lesions    DATA:    CBC:   Lab Results   Component Value Date/Time    WBC 7.7 05/05/2025

## 2025-05-05 NOTE — PROGRESS NOTES
[facility]  Internal Medicine Residency Program  Progress Note - House Team 1    Patient:  Wander Rocha 69 y.o. male MRN: [id number]     Date of Service: 5/5/2025     CC: Transferred to  house team to service on/30/25 after upgrading his pacemaker to a CRT device   Overnight events:  No overnight events     Subjective     Patient was seen at bedside this morning. He is alert and oriented x 3. He continues to have 3+ bilateral lower extremity and sacral edema. Currently on baseline 2 L of O2, denies worsening shortness of breath. Bowel and bladder habits are normal. Appetite is normal.     Objective     Physical Exam:  Vitals: /60   Pulse 69   Temp 97 °F (36.1 °C) (Temporal)   Resp 16   Ht 1.702 m (5' 7\")   Wt 98.7 kg (217 lb 9.6 oz)   SpO2 96%   BMI 34.08 kg/m²     I & O - 24hr: No intake/output data recorded.   General Appearance: alert, appears stated age, and cooperative  HEENT:  Head: Normocephalic, no lesions, without obvious abnormality.  Neck: no adenopathy, no carotid bruit, no JVD, supple, symmetrical, trachea midline, and thyroid not enlarged, symmetric, no tenderness/mass/nodules  Lung: rales bibasilar and bilaterally, on 2 L of O2 by nasal cannula   Heart: regular rate and rhythm, S1, S2 normal, no murmur, click, rub or gallop  Abdomen: soft, non-tender; bowel sounds normal; no masses,  no organomegaly  Extremities:  edema 3+ bilateral lower extremities, sacral edema   Musculokeletal: No joint swelling, no muscle tenderness. ROM normal in all joints of extremities.   Neurologic: Mental status: Alert, oriented, thought content appropriate    Subjective  Pertinent Labs & Imaging Studies     CBC with Differential:    Lab Results   Component Value Date/Time    WBC 7.4 05/04/2025 08:42 AM    RBC 3.01 05/04/2025 08:42 AM    HGB 9.1 05/04/2025 08:42 AM    HCT 29.0 05/04/2025 08:42 AM     05/04/2025 08:42 AM    MCV 96.3 05/04/2025 08:42 AM    MCH 30.2 05/04/2025 08:42

## 2025-05-05 NOTE — PLAN OF CARE
Problem: Skin/Tissue Integrity  Goal: Skin integrity remains intact  Description: 1.  Monitor for areas of redness and/or skin breakdown2.  Assess vascular access sites hourly3.  Every 4-6 hours minimum:  Change oxygen saturation probe site4.  Every 4-6 hours:  If on nasal continuous positive airway pressure, respiratory therapy assess nares and determine need for appliance change or resting period  Outcome: Progressing

## 2025-05-06 ENCOUNTER — APPOINTMENT (OUTPATIENT)
Dept: ULTRASOUND IMAGING | Age: 70
DRG: 242 | End: 2025-05-06
Attending: INTERNAL MEDICINE
Payer: MEDICARE

## 2025-05-06 LAB
ANION GAP SERPL CALCULATED.3IONS-SCNC: 14 MMOL/L (ref 7–16)
BASOPHILS # BLD: 0.03 K/UL (ref 0–0.2)
BASOPHILS NFR BLD: 0 % (ref 0–2)
BNP SERPL-MCNC: 5387 PG/ML (ref 0–125)
BUN SERPL-MCNC: 88 MG/DL (ref 8–23)
CALCIUM SERPL-MCNC: 9.3 MG/DL (ref 8.8–10.2)
CHLORIDE SERPL-SCNC: 93 MMOL/L (ref 98–107)
CO2 SERPL-SCNC: 29 MMOL/L (ref 22–29)
CREAT SERPL-MCNC: 2 MG/DL (ref 0.7–1.2)
EOSINOPHIL # BLD: 0.35 K/UL (ref 0.05–0.5)
EOSINOPHILS RELATIVE PERCENT: 5 % (ref 0–6)
ERYTHROCYTE [DISTWIDTH] IN BLOOD BY AUTOMATED COUNT: 16.5 % (ref 11.5–15)
GFR, ESTIMATED: 35 ML/MIN/1.73M2
GLUCOSE BLD-MCNC: 144 MG/DL (ref 74–99)
GLUCOSE BLD-MCNC: 144 MG/DL (ref 74–99)
GLUCOSE BLD-MCNC: 202 MG/DL (ref 74–99)
GLUCOSE BLD-MCNC: 225 MG/DL (ref 74–99)
GLUCOSE SERPL-MCNC: 132 MG/DL (ref 74–99)
HCT VFR BLD AUTO: 26.8 % (ref 37–54)
HGB BLD-MCNC: 8.9 G/DL (ref 12.5–16.5)
IMM GRANULOCYTES # BLD AUTO: 0.06 K/UL (ref 0–0.58)
IMM GRANULOCYTES NFR BLD: 1 % (ref 0–5)
LYMPHOCYTES NFR BLD: 0.66 K/UL (ref 1.5–4)
LYMPHOCYTES RELATIVE PERCENT: 9 % (ref 20–42)
MCH RBC QN AUTO: 31.2 PG (ref 26–35)
MCHC RBC AUTO-ENTMCNC: 33.2 G/DL (ref 32–34.5)
MCV RBC AUTO: 94 FL (ref 80–99.9)
MONOCYTES NFR BLD: 0.9 K/UL (ref 0.1–0.95)
MONOCYTES NFR BLD: 12 % (ref 2–12)
NEUTROPHILS NFR BLD: 73 % (ref 43–80)
NEUTS SEG NFR BLD: 5.39 K/UL (ref 1.8–7.3)
PLATELET # BLD AUTO: 185 K/UL (ref 130–450)
PMV BLD AUTO: 9.2 FL (ref 7–12)
POTASSIUM SERPL-SCNC: 4.1 MMOL/L (ref 3.5–5.1)
RBC # BLD AUTO: 2.85 M/UL (ref 3.8–5.8)
SODIUM SERPL-SCNC: 135 MMOL/L (ref 136–145)
WBC OTHER # BLD: 7.4 K/UL (ref 4.5–11.5)

## 2025-05-06 PROCEDURE — 97530 THERAPEUTIC ACTIVITIES: CPT

## 2025-05-06 PROCEDURE — 97535 SELF CARE MNGMENT TRAINING: CPT

## 2025-05-06 PROCEDURE — 36415 COLL VENOUS BLD VENIPUNCTURE: CPT

## 2025-05-06 PROCEDURE — 2500000003 HC RX 250 WO HCPCS: Performed by: INTERNAL MEDICINE

## 2025-05-06 PROCEDURE — 83880 ASSAY OF NATRIURETIC PEPTIDE: CPT

## 2025-05-06 PROCEDURE — 82962 GLUCOSE BLOOD TEST: CPT

## 2025-05-06 PROCEDURE — 2700000000 HC OXYGEN THERAPY PER DAY

## 2025-05-06 PROCEDURE — 6360000002 HC RX W HCPCS: Performed by: STUDENT IN AN ORGANIZED HEALTH CARE EDUCATION/TRAINING PROGRAM

## 2025-05-06 PROCEDURE — 85025 COMPLETE CBC W/AUTO DIFF WBC: CPT

## 2025-05-06 PROCEDURE — 99233 SBSQ HOSP IP/OBS HIGH 50: CPT | Performed by: INTERNAL MEDICINE

## 2025-05-06 PROCEDURE — 2060000000 HC ICU INTERMEDIATE R&B

## 2025-05-06 PROCEDURE — 6370000000 HC RX 637 (ALT 250 FOR IP): Performed by: STUDENT IN AN ORGANIZED HEALTH CARE EDUCATION/TRAINING PROGRAM

## 2025-05-06 PROCEDURE — 2580000003 HC RX 258: Performed by: STUDENT IN AN ORGANIZED HEALTH CARE EDUCATION/TRAINING PROGRAM

## 2025-05-06 PROCEDURE — 6370000000 HC RX 637 (ALT 250 FOR IP): Performed by: NURSE PRACTITIONER

## 2025-05-06 PROCEDURE — 6370000000 HC RX 637 (ALT 250 FOR IP): Performed by: INTERNAL MEDICINE

## 2025-05-06 PROCEDURE — 80048 BASIC METABOLIC PNL TOTAL CA: CPT

## 2025-05-06 PROCEDURE — 51798 US URINE CAPACITY MEASURE: CPT

## 2025-05-06 PROCEDURE — 99232 SBSQ HOSP IP/OBS MODERATE 35: CPT | Performed by: INTERNAL MEDICINE

## 2025-05-06 PROCEDURE — 93970 EXTREMITY STUDY: CPT

## 2025-05-06 RX ORDER — CLOBETASOL PROPIONATE 0.5 MG/G
CREAM TOPICAL 2 TIMES DAILY
Status: DISCONTINUED | OUTPATIENT
Start: 2025-05-06 | End: 2025-05-09 | Stop reason: HOSPADM

## 2025-05-06 RX ORDER — TAMSULOSIN HYDROCHLORIDE 0.4 MG/1
0.4 CAPSULE ORAL DAILY
Status: DISCONTINUED | OUTPATIENT
Start: 2025-05-06 | End: 2025-05-09 | Stop reason: HOSPADM

## 2025-05-06 RX ADMIN — SENNOSIDES 8.6 MG: 8.6 TABLET, COATED ORAL at 21:23

## 2025-05-06 RX ADMIN — ASPIRIN 81 MG: 81 TABLET, COATED ORAL at 10:03

## 2025-05-06 RX ADMIN — BUMETANIDE 1 MG/HR: 0.25 INJECTION INTRAMUSCULAR; INTRAVENOUS at 01:03

## 2025-05-06 RX ADMIN — APIXABAN 5 MG: 5 TABLET, FILM COATED ORAL at 10:03

## 2025-05-06 RX ADMIN — SPIRONOLACTONE 25 MG: 25 TABLET ORAL at 10:03

## 2025-05-06 RX ADMIN — DOXYCYCLINE HYCLATE 100 MG: 100 CAPSULE ORAL at 10:02

## 2025-05-06 RX ADMIN — ALLOPURINOL 200 MG: 100 TABLET ORAL at 10:02

## 2025-05-06 RX ADMIN — POLYETHYLENE GLYCOL 3350 17 G: 17 POWDER, FOR SOLUTION ORAL at 10:03

## 2025-05-06 RX ADMIN — PETROLATUM: 420 OINTMENT TOPICAL at 21:23

## 2025-05-06 RX ADMIN — INSULIN GLARGINE 14 UNITS: 100 INJECTION, SOLUTION SUBCUTANEOUS at 21:23

## 2025-05-06 RX ADMIN — DOXYCYCLINE HYCLATE 100 MG: 100 CAPSULE ORAL at 21:23

## 2025-05-06 RX ADMIN — SODIUM CHLORIDE, PRESERVATIVE FREE 10 ML: 5 INJECTION INTRAVENOUS at 21:29

## 2025-05-06 RX ADMIN — Medication 2000 UNITS: at 10:02

## 2025-05-06 RX ADMIN — CLOBETASOL PROPIONATE: 0.5 CREAM TOPICAL at 21:28

## 2025-05-06 RX ADMIN — LEVOTHYROXINE SODIUM 150 MCG: 0.1 TABLET ORAL at 06:35

## 2025-05-06 RX ADMIN — FERROUS SULFATE TAB 325 MG (65 MG ELEMENTAL FE) 325 MG: 325 (65 FE) TAB at 10:02

## 2025-05-06 RX ADMIN — CLOBETASOL PROPIONATE: 0.5 CREAM TOPICAL at 16:00

## 2025-05-06 RX ADMIN — INSULIN LISPRO 1 UNITS: 100 INJECTION, SOLUTION INTRAVENOUS; SUBCUTANEOUS at 21:23

## 2025-05-06 RX ADMIN — APIXABAN 5 MG: 5 TABLET, FILM COATED ORAL at 21:23

## 2025-05-06 RX ADMIN — TAMSULOSIN HYDROCHLORIDE 0.4 MG: 0.4 CAPSULE ORAL at 16:00

## 2025-05-06 RX ADMIN — SERTRALINE HYDROCHLORIDE 50 MG: 50 TABLET ORAL at 10:02

## 2025-05-06 RX ADMIN — SODIUM CHLORIDE, PRESERVATIVE FREE 10 ML: 5 INJECTION INTRAVENOUS at 10:07

## 2025-05-06 RX ADMIN — ATORVASTATIN CALCIUM 40 MG: 40 TABLET, FILM COATED ORAL at 10:03

## 2025-05-06 RX ADMIN — HYDROCODONE BITARTRATE AND ACETAMINOPHEN 1 TABLET: 5; 325 TABLET ORAL at 21:23

## 2025-05-06 RX ADMIN — METOPROLOL SUCCINATE 25 MG: 25 TABLET, EXTENDED RELEASE ORAL at 10:03

## 2025-05-06 ASSESSMENT — PAIN SCALES - GENERAL
PAINLEVEL_OUTOF10: 10
PAINLEVEL_OUTOF10: 0

## 2025-05-06 ASSESSMENT — PAIN SCALES - WONG BAKER: WONGBAKER_NUMERICALRESPONSE: NO HURT

## 2025-05-06 ASSESSMENT — PAIN DESCRIPTION - LOCATION: LOCATION: BACK

## 2025-05-06 NOTE — PLAN OF CARE
Problem: Chronic Conditions and Co-morbidities  Goal: Patient's chronic conditions and co-morbidity symptoms are monitored and maintained or improved  5/5/2025 2122 by Brionna Temple RN  Outcome: Progressing  Flowsheets (Taken 5/2/2025 1956 by Velia Zayas, RN)  Care Plan - Patient's Chronic Conditions and Co-Morbidity Symptoms are Monitored and Maintained or Improved: Monitor and assess patient's chronic conditions and comorbid symptoms for stability, deterioration, or improvement  5/5/2025 1237 by Jazmyn Hester RN  Outcome: Progressing     Problem: Discharge Planning  Goal: Discharge to home or other facility with appropriate resources  5/5/2025 2122 by Brionna Temple RN  Outcome: Progressing  Flowsheets (Taken 5/2/2025 1956 by Velia Zayas RN)  Discharge to home or other facility with appropriate resources: Identify barriers to discharge with patient and caregiver  5/5/2025 1237 by Jazmyn Hester RN  Outcome: Progressing     Problem: ABCDS Injury Assessment  Goal: Absence of physical injury  5/5/2025 2122 by Brionna Temple RN  Outcome: Progressing  Flowsheets (Taken 5/3/2025 2126 by Velia Zayas RN)  Absence of Physical Injury: Implement safety measures based on patient assessment  5/5/2025 1237 by Jazmyn Hester RN  Outcome: Progressing     Problem: Pain  Goal: Verbalizes/displays adequate comfort level or baseline comfort level  5/5/2025 2122 by Brionna Temple RN  Outcome: Progressing  Flowsheets (Taken 4/26/2025 0054 by Clau Rivera, RN)  Verbalizes/displays adequate comfort level or baseline comfort level:   Encourage patient to monitor pain and request assistance   Assess pain using appropriate pain scale  5/5/2025 1237 by Jazmyn Hester RN  Outcome: Progressing     Problem: Safety - Adult  Goal: Free from fall injury  5/5/2025 2122 by Brionna Temple RN  Outcome: Progressing  Flowsheets (Taken 5/3/2025 2126 by Velia Zayas RN)  Free From Fall Injury:  RN  Outcome: Progressing     Problem: Nutrition Deficit:  Goal: Optimize nutritional status  Outcome: Progressing  Flowsheets (Taken 5/5/2025 2122)  Nutrient intake appropriate for improving, restoring, or maintaining nutritional needs:   Assess nutritional status and recommend course of action   Monitor oral intake, labs, and treatment plans   Recommend appropriate diets, oral nutritional supplements, and vitamin/mineral supplements

## 2025-05-06 NOTE — PROGRESS NOTES
Messaged Dr. Gupta regarding patient's status. Patient looks to have a rash on lower abdomen and barbara area as well. Dr. Gupta will send Resident to assess patient.

## 2025-05-06 NOTE — PROGRESS NOTES
Physical Therapy  Physical Therapy Treatment     Name: Wander Rocha  : 1955  MRN: [id number]      Date of Service: 2025    Evaluating PT:  Vik Guzman PT, DPT    Room #:  7415/7415-A  Diagnosis:  Tachycardia-bradycardia (HCC) [I49.5]  S/P cardiac pacemaker procedure [Z95.0]  Heart failure with mid-range ejection fraction (HFmEF) (HCC) [I50.22]  PMHx/PSHx:  afib, HTN, HLD, DM II, CKD, anemia  Procedure/Surgery:  s/p biventricular pacemaker upgrade   Precautions:  pacemaker, fall risk, O2, cognition  Equipment Owned: cane, ww, O2 (3L at night)  Equipment Needs:  TBD    SUBJECTIVE:    Pt lives with spouse in a 2 story home with 3 steps to enter and B handrail.  Bed/bath is on 2nd floor, pt sleeps in recliner on 1st floor.  Pt ambulated with no AD PTA.    OBJECTIVE:   Initial Evaluation  Date: 25 Treatment Date: 25 Short Term/ Long Term   Goals   AM-PAC 6 Clicks     Was pt agreeable to Eval/treatment? yes yes    Does pt have pain? 8/10 generalized BLE pain     Bed Mobility  Rolling: NT  Supine to sit: NT  Sit to supine: NT  Scooting: SBA Rolling: NT  Supine to sit: MaxA HOB elevated  Sit to supine:MaxA  Scooting: MaxA Rolling: mod I  Supine to sit: mod I  Sit to supine: mod I  Scooting: mod I   Transfers Sit to stand: min A  Stand to sit: min A  Stand pivot: min A with HHA Sit to stand: Mod Ax2  Stand to sit: modAx2  Stand pivot: modAx2 HHA Sit to stand: mod I  Stand to sit: mod I  Stand pivot: mod I with AAD   Ambulation    20'x4 with HHA min A 25'x2 HHA ModAx2 150'+ with AAD mod I   Stair negotiation: ascended and descended  NT NT 10 steps with 1 handrail mod I     Strength/ROM:   BLE grossly 4/5  BLE AROM WFL    Balance:   Static Sitting: SBA  Dynamic Sitting: SBA  Static Standing: CGA with HHA  Dynamic Standing: min A with HHA    Pt is A & O x 3  Sensation:  Pt denies numbness and tingling to extremities  Edema:  unremarkable    Vitals:  SPO2 and HR were stable throughout session.

## 2025-05-06 NOTE — PROGRESS NOTES
Noted increased redness in B LE's this date. Nursing notified and aware.    Cognition: A&O: 4/4; Follows 1-2 step directions   Mild delayed processing   Memory: fair   Sequencing: fair   Problem solving: fair   Judgement/safety: fair  Functional Assessment:  AM-PAC Daily Activity Raw Score: 15/24    Initial Eval Status  Date: 4/28/25 Treatment Status  Date:5/6/25 STGs = LTGs  Time frame: 10-14 days   Feeding Independent  Independent    Grooming Minimal Assist     Standing  Min A  Seated at EOB pt demo'd ability to wash face/hands . Modified [place]    UB Dressing Minimal Assist       Cuing on modified techniques within precautions Min A  To don hospital gown seated at EOB with assist required to manage gown around trunk in back.  Modified [place]    LB Dressing Maximal Assist    Dependent  To don/doff socks bed level. Modified [place]    Bathing Moderate Assist Max A  Simulated bathing. Limited by fatigue and decreased functional reach.    Modified [place]    Toileting Moderate Assist    Mod A  Pt able to utilize urinal bed level with Min A. Pt will require assist for personal hygiene and clothing management.  Modified [place]    Bed Mobility  Supine to sit: NT   Sit to supine: NT  Supine<>Sit:Max A  Assist required for trunk and B LE's. Min cues for safety required.  Supine to sit: Modified [place]   Sit to supine: Modified [place]    Functional Transfers Minimal Assist      Sit<>Stand: Mod A x2  SPT: Mod A x2  Min cues for sequencing/safety required. Modified [place]    Functional Mobility Minimal Assist     Ambulated in room and hallway with HHA (several standing rest breaks due to BALL; O2 sat 85-90% on 3L) Mod A x2  Completed < household distances within pt's room/bathroom with HHA with assist required for balance/safety. Posterior lean requiring increased assist for safety with directional changes. Modified [place]    Balance Sitting:     Static:  SBA     Dynamic:SBA  Standing: min A with HHA Sitting:     Static: SBA    Dynamic:Min A  Standing: Mod A x1-2    Activity Tolerance P+    Pt limited due to BALL / O2 saturation and fatigue / weakness.  Fair-  For light activity. F+   Visual/  Perceptual wfl        Comments: Cleared by RN to see pt. Upon arrival patient in supine with HOB elevated and family present and agreeable to OT session. At end of session, patient returned to sitting in bedside chair with call light within reach, all lines and tubes intact. Pt educated on adaptive techniques for ADL tasks, transfer safety, posture, body mechanics, precautions, maintaining precautions during functional activities, benefits of increasing activity and safety/fall prevention. Pt instructed on use of call light for assistance and fall prevention. Pt required rest breaks during session and educated on pursed lip breathing. Pt would benefit from continued skilled OT to increase safety and independence with completion of ADL/IADL tasks for functional independence and quality of life.    Treatment: OT treatment provided this date includes:   ADL- Instruction/training on safety and adapted techniques for completion of ADLs.   Transfers/Mobility- Instruction/training on safety and improved independence with bed mobility/functional transfers and functional mobility.   Sitting/Standing Balance/Tolerance: to increase balance and activity tolerance during ADLs and facilitate proper posture and positioning.   Activity tolerance- Instruction/training on energy conservation/work simplification for completion of ADLs.    Cognitive retraining - Cues for safety, sequencing, and problem solving.    Skilled positioning/alignment-  Proper Positioning/Alignment for pressure relief, joint protection and to increase functional interaction with environment.    Skilled monitoring of Vital signs & Pt's response to Tx- Vitals WNL. Decreased tolerance for activity this date.    Pt has made fair

## 2025-05-06 NOTE — PROGRESS NOTES
Department of Internal Medicine  Nephrology Progress Note    Events reviewed.    SUBJECTIVE: We are following Wander Rocha for RADHA. Patient reports no complaints.     PHYSICAL EXAM:      Vitals:    VITALS:  BP (!) 123/53   Pulse 58   Temp 97.7 °F (36.5 °C) (Temporal)   Resp 18   Ht 1.702 m (5' 7\")   Wt 101.4 kg (223 lb 8.7 oz)   SpO2 93%   BMI 35.01 kg/m²   24HR INTAKE/OUTPUT:    Intake/Output Summary (Last 24 hours) at 5/6/2025 0949  Last data filed at 5/6/2025 0636  Gross per 24 hour   Intake 460 ml   Output 1377 ml   Net -917 ml     Scheduled Meds:   white petrolatum   Topical BID    spironolactone  25 mg Oral Daily    apixaban  5 mg Oral BID    insulin glargine  14 Units SubCUTAneous Nightly    insulin lispro  0-4 Units SubCUTAneous 4x Daily AC & HS    polyethylene glycol  17 g Oral Daily    senna  1 tablet Oral Nightly    doxycycline hyclate  100 mg Oral 2 times per day    vitamin D  2,000 Units Oral Daily    aspirin  81 mg Oral Daily    ferrous sulfate  325 mg Oral Every Other Day    atorvastatin  40 mg Oral Daily    metoprolol succinate  25 mg Oral Daily    sertraline  50 mg Oral Daily    allopurinol  200 mg Oral Daily    [Held by provider] lisinopril  5 mg Oral Daily    sodium chloride flush  5-40 mL IntraVENous 2 times per day    levothyroxine  150 mcg Oral Daily     Continuous Infusions:   bumetanide (BUMEX) 12.5 mg in sodium chloride 0.9 % 125 mL infusion 1 mg/hr (05/06/25 0103)    dextrose      sodium chloride       PRN Meds:.HYDROcodone 5 mg - acetaminophen, glucose, dextrose bolus **OR** dextrose bolus, glucagon (rDNA), dextrose, sodium chloride flush, sodium chloride, ondansetron, acetaminophen     Constitutional:  Awake, alert, oriented, in NAD  HEENT:  PERRLA, normocephalic, atraumatic  Respiratory: Diminished  Cardiovascular/Edema:  RRR, normal S1, normal S2  Gastrointestinal:  Soft, flat, non-distended, non-tender  Neurologic:  Nonfocal  Skin:  warm, dry, no rashes, no lesions    DATA:   Bumex, metoprolol and lisinopril.  Chest x-ray shows cardiomegaly with mild interstitial edema and a small left pleural effusion and was given IV Bumex.    Problems resolved:  Hypotonic hyponatremia with hypervolemia, 2/2 acute decompensated heart failure, sodium levels slowly improving up to 136, still with large edema. Resolved  Hyperchloremic metabolic acidosis with respiratory compensation, resolved      IMPRESSION/RECOMMENDATIONS:      RADHA stage I on CKD likely 2/2 hemodynamically mediated acute decompensated heart failure versus ACE inhibition, renal function worsened to 2.0 mg/dL, hold diuresis for now    CKD stage 3a probably 2/2 nephrosclerosis and previous recurrent episodes of ischemic ATN requiring dialysis,  baseline creatinine 1.3 mg/dL    HFpEF 45%, proBNP 2341, Bumex  --------------------------------  Persistent atrial fibrillation  Pacemaker insertion 4/23/2025  Hypothyroidism, on levothyroxine  Type II DM    Plan:    Discontinue Bumex drip  Continue to monitor renal function  Continue to monitor sodium level  Continue fluid restriction dry tray      Electronically signed by JACQUELINE Martinez - CNP on 5/6/2025 at 9:49 AM     I saw and evaluated the patient, performing the key elements of the service. I discussed the findings, assessment and plan with NP and agree with her findings and plans as documented in her note.        Manoj Chavez MD

## 2025-05-06 NOTE — PROGRESS NOTES
INPATIENT CARDIOLOGY FOLLOW-UP    Name: Wander Rocha    Age: 69 y.o.    Date of Admission: 4/23/2025  8:25 AM    Date of Service: 5/6/2025    Primary Cardiologist: Dr. Fontanez    Chief Complaint: Follow-up for ADHF    Interim History:  No new overnight cardiac complaints. Currently with no complaints of CP, SOB, palpitations, dizziness, or lightheadedness.  Dual AV paced rhythm on telemetry    Remains on 2 L of oxygen nasal cannula.  No home oxygen.  1.9 L negative so far.  Bumetanide drip discontinued this morning    Review of Systems:   Negative except as described above    Problem List:  Patient Active Problem List   Diagnosis    Class 2 obesity due to excess calories with serious comorbidity and body mass index (BMI) of 38.0 to 38.9 in adult    Hyperlipidemia    Essential hypertension    Hypothyroidism    NIKO (obstructive sleep apnea)    Restrictive lung disease secondary to obesity    Tachycardia-bradycardia (HCC)    Pacemaker    Persistent atrial fibrillation (HCC)    Chronic obstructive pulmonary disease, unspecified COPD type (HCC)    Acute decompensated heart failure (HCC)    Normocytic anemia    Stage 3b chronic kidney disease ([facility])    Proteinuria due to type 2 diabetes mellitus (HCC)    Gout    Type 2 diabetes mellitus with diabetic neuropathy, with long-term current use of insulin (HCC)    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, unspecified laterality (HCC)    Major depressive disorder, recurrent, unspecified    S/P cardiac pacemaker procedure    Nonischemic cardiomyopathy (HCC)    Heart failure with mid-range ejection fraction (HFmEF) (HCC)    Biventricular CHF (congestive heart failure) ([facility])    Cardiorenal syndrome    Pressure injury of buttock, stage 1    VHD (valvular heart disease)       Current Medications:    Current Facility-Administered Medications:     white petrolatum ointment, , Topical, BID, Uli Viramontes MD, Given at 05/05/25 2056    spironolactone (ALDACTONE) tablet 25 mg, 25 mg,  Oral, Daily, Todd Bass MD, 25 mg at 05/05/25 1448    apixaban (ELIQUIS) tablet 5 mg, 5 mg, Oral, BID, Martell Momin, APRN - CNP, 5 mg at 05/05/25 2057    insulin glargine (LANTUS) injection vial 14 Units, 14 Units, SubCUTAneous, Nightly, Marcelle Damian MD, 14 Units at 05/05/25 2057    insulin lispro (HUMALOG,ADMELOG) injection vial 0-4 Units, 0-4 Units, SubCUTAneous, 4x Daily AC & HS, Marcelle Damian MD, 1 Units at 05/05/25 1135    polyethylene glycol (GLYCOLAX) packet 17 g, 17 g, Oral, Daily, Marcelle Damian MD, 17 g at 05/05/25 0858    senna (SENOKOT) tablet 8.6 mg, 1 tablet, Oral, Nightly, Marcelle Damian MD, 8.6 mg at 05/05/25 2057    doxycycline hyclate (VIBRAMYCIN) capsule 100 mg, 100 mg, Oral, 2 times per day, Martell Momin, JACQUELINE - CNP, 100 mg at 05/05/25 2057    bumetanide (BUMEX) 12.5 mg in sodium chloride 0.9 % 125 mL infusion, 1 mg/hr, IntraVENous, Continuous, Marcelle Damian MD, Last Rate: 10 mL/hr at 05/06/25 0103, 1 mg/hr at 05/06/25 0103    HYDROcodone-acetaminophen (NORCO) 5-325 MG per tablet 1 tablet, 1 tablet, Oral, Q6H PRN, Miracle Wood MD, 1 tablet at 05/05/25 2057    glucose chewable tablet 16 g, 4 tablet, Oral, PRN, Miracle Wood MD    dextrose bolus 10% 125 mL, 125 mL, IntraVENous, PRN **OR** dextrose bolus 10% 250 mL, 250 mL, IntraVENous, PRN, Miracle Wood MD    glucagon injection 1 mg, 1 mg, SubCUTAneous, PRN, Miracle Wood MD    dextrose 10 % infusion, , IntraVENous, Continuous PRN, Miracle Wood MD    vitamin D (CHOLECALCIFEROL) tablet 2,000 Units, 2,000 Units, Oral, Daily, Elsie Pereira MD, 2,000 Units at 05/05/25 0859    aspirin EC tablet 81 mg, 81 mg, Oral, Daily, Elsie Pereira MD, 81 mg at 05/05/25 0859    ferrous sulfate (IRON 325) tablet 325 mg, 325 mg, Oral, Every Other Day, Elsie Pereira MD, 325 mg at 05/04/25 0900    atorvastatin (LIPITOR) tablet 40 mg, 40 mg, Oral, Daily, Elsie Pereira MD, 40 mg at 05/05/25 0859

## 2025-05-06 NOTE — PROGRESS NOTES
[facility]  Internal Medicine Residency Program  Progress Note - House Team     Patient:  Wander Rocha 69 y.o. male MRN: [id number]     Date of Service: 5/6/2025     CC: RADHA s/p upgrade of pacemaker   Overnight events: None     Subjective     Patient was seen and examined this morning at bedside in no acute distress. Overnight, he reported development of redness on his legs. Upon exam today, both legs are erythematous more on the right and appears to be stasis dermatitis.     Objective     Physical Exam:  Vitals: BP (!) 107/58   Pulse 62   Temp 97.5 °F (36.4 °C) (Oral)   Resp 18   Ht 1.702 m (5' 7\")   Wt 101.4 kg (223 lb 8.7 oz)   SpO2 95%   BMI 35.01 kg/m²     I & O - 24hr: No intake/output data recorded.   General Appearance: alert, appears stated age, and cooperative  HEENT:  Head: Normal, normocephalic, atraumatic.  Neck: no adenopathy, no carotid bruit, and supple, +JVD, +hepatojugular reflex   Lung: Bibasilar rales, no retractions or use of accessory muscles, good airflow in upper fields   Heart: regular rate and rhythm, S1, S2 normal, no murmur, click, rub or gallop  Abdomen: soft, non-tender; bowel sounds normal  Extremities:  extremities normal, atraumatic, no cyanosis, +2 pitting edema below both knees, erythema on both legs worse on right   Musculokeletal: No joint swelling, no muscle tenderness.   Neurologic: Mental status: Alert, oriented, thought content appropriate    Subjective  Pertinent Labs & Imaging Studies     CBC with Differential:    Lab Results   Component Value Date/Time    WBC 7.7 05/05/2025 07:32 AM    RBC 2.89 05/05/2025 07:32 AM    HGB 9.0 05/05/2025 07:32 AM    HCT 27.5 05/05/2025 07:32 AM     05/05/2025 07:32 AM    MCV 95.2 05/05/2025 07:32 AM    MCH 31.1 05/05/2025 07:32 AM    MCHC 32.7 05/05/2025 07:32 AM    RDW 16.6 05/05/2025 07:32 AM    METASPCT 2 05/02/2025 02:37 PM    LYMPHOPCT 9 05/05/2025 07:32 AM    MONOPCT 8 05/05/2025 07:32 AM    MYELOPCT

## 2025-05-07 LAB
ANION GAP SERPL CALCULATED.3IONS-SCNC: 14 MMOL/L (ref 7–16)
ANION GAP SERPL CALCULATED.3IONS-SCNC: 16 MMOL/L (ref 7–16)
BNP SERPL-MCNC: 5204 PG/ML (ref 0–125)
BUN SERPL-MCNC: 96 MG/DL (ref 8–23)
BUN SERPL-MCNC: 98 MG/DL (ref 8–23)
CALCIUM SERPL-MCNC: 9.2 MG/DL (ref 8.8–10.2)
CALCIUM SERPL-MCNC: 9.6 MG/DL (ref 8.8–10.2)
CHLORIDE SERPL-SCNC: 90 MMOL/L (ref 98–107)
CHLORIDE SERPL-SCNC: 92 MMOL/L (ref 98–107)
CO2 SERPL-SCNC: 29 MMOL/L (ref 22–29)
CO2 SERPL-SCNC: 29 MMOL/L (ref 22–29)
CREAT SERPL-MCNC: 2 MG/DL (ref 0.7–1.2)
CREAT SERPL-MCNC: 2.1 MG/DL (ref 0.7–1.2)
GFR, ESTIMATED: 33 ML/MIN/1.73M2
GFR, ESTIMATED: 35 ML/MIN/1.73M2
GLUCOSE BLD-MCNC: 146 MG/DL (ref 74–99)
GLUCOSE BLD-MCNC: 174 MG/DL (ref 74–99)
GLUCOSE BLD-MCNC: 175 MG/DL (ref 74–99)
GLUCOSE BLD-MCNC: 314 MG/DL (ref 74–99)
GLUCOSE SERPL-MCNC: 139 MG/DL (ref 74–99)
GLUCOSE SERPL-MCNC: 140 MG/DL (ref 74–99)
POTASSIUM SERPL-SCNC: 4.1 MMOL/L (ref 3.5–5.1)
POTASSIUM SERPL-SCNC: 4.2 MMOL/L (ref 3.5–5.1)
SODIUM SERPL-SCNC: 134 MMOL/L (ref 136–145)
SODIUM SERPL-SCNC: 134 MMOL/L (ref 136–145)

## 2025-05-07 PROCEDURE — 82962 GLUCOSE BLOOD TEST: CPT

## 2025-05-07 PROCEDURE — 6370000000 HC RX 637 (ALT 250 FOR IP): Performed by: INTERNAL MEDICINE

## 2025-05-07 PROCEDURE — P9047 ALBUMIN (HUMAN), 25%, 50ML: HCPCS

## 2025-05-07 PROCEDURE — 6360000002 HC RX W HCPCS: Performed by: INTERNAL MEDICINE

## 2025-05-07 PROCEDURE — 2500000003 HC RX 250 WO HCPCS: Performed by: INTERNAL MEDICINE

## 2025-05-07 PROCEDURE — 36415 COLL VENOUS BLD VENIPUNCTURE: CPT

## 2025-05-07 PROCEDURE — 80048 BASIC METABOLIC PNL TOTAL CA: CPT

## 2025-05-07 PROCEDURE — 6360000002 HC RX W HCPCS

## 2025-05-07 PROCEDURE — 6370000000 HC RX 637 (ALT 250 FOR IP)

## 2025-05-07 PROCEDURE — 6370000000 HC RX 637 (ALT 250 FOR IP): Performed by: STUDENT IN AN ORGANIZED HEALTH CARE EDUCATION/TRAINING PROGRAM

## 2025-05-07 PROCEDURE — 2580000003 HC RX 258: Performed by: INTERNAL MEDICINE

## 2025-05-07 PROCEDURE — 99232 SBSQ HOSP IP/OBS MODERATE 35: CPT | Performed by: INTERNAL MEDICINE

## 2025-05-07 PROCEDURE — 99233 SBSQ HOSP IP/OBS HIGH 50: CPT | Performed by: INTERNAL MEDICINE

## 2025-05-07 PROCEDURE — 6370000000 HC RX 637 (ALT 250 FOR IP): Performed by: NURSE PRACTITIONER

## 2025-05-07 PROCEDURE — 2060000000 HC ICU INTERMEDIATE R&B

## 2025-05-07 PROCEDURE — 83880 ASSAY OF NATRIURETIC PEPTIDE: CPT

## 2025-05-07 RX ORDER — ALBUMIN (HUMAN) 12.5 G/50ML
25 SOLUTION INTRAVENOUS 2 TIMES DAILY
Status: COMPLETED | OUTPATIENT
Start: 2025-05-07 | End: 2025-05-07

## 2025-05-07 RX ORDER — SENNOSIDES A AND B 8.6 MG/1
1 TABLET, FILM COATED ORAL 2 TIMES DAILY
Status: DISCONTINUED | OUTPATIENT
Start: 2025-05-07 | End: 2025-05-09 | Stop reason: HOSPADM

## 2025-05-07 RX ADMIN — SODIUM CHLORIDE, PRESERVATIVE FREE 10 ML: 5 INJECTION INTRAVENOUS at 08:56

## 2025-05-07 RX ADMIN — MICONAZOLE NITRATE: 20 OINTMENT TOPICAL at 22:00

## 2025-05-07 RX ADMIN — SENNOSIDES 8.6 MG: 8.6 TABLET, FILM COATED ORAL at 21:53

## 2025-05-07 RX ADMIN — SODIUM CHLORIDE, PRESERVATIVE FREE 10 ML: 5 INJECTION INTRAVENOUS at 22:00

## 2025-05-07 RX ADMIN — APIXABAN 5 MG: 5 TABLET, FILM COATED ORAL at 08:53

## 2025-05-07 RX ADMIN — Medication 2000 UNITS: at 08:54

## 2025-05-07 RX ADMIN — PETROLATUM: 420 OINTMENT TOPICAL at 22:00

## 2025-05-07 RX ADMIN — APIXABAN 5 MG: 5 TABLET, FILM COATED ORAL at 21:53

## 2025-05-07 RX ADMIN — SERTRALINE HYDROCHLORIDE 50 MG: 50 TABLET ORAL at 08:54

## 2025-05-07 RX ADMIN — ALBUMIN (HUMAN) 25 G: 0.25 INJECTION, SOLUTION INTRAVENOUS at 12:04

## 2025-05-07 RX ADMIN — CLOBETASOL PROPIONATE: 0.5 CREAM TOPICAL at 08:55

## 2025-05-07 RX ADMIN — ONDANSETRON 4 MG: 2 INJECTION, SOLUTION INTRAMUSCULAR; INTRAVENOUS at 21:54

## 2025-05-07 RX ADMIN — ATORVASTATIN CALCIUM 40 MG: 40 TABLET, FILM COATED ORAL at 08:53

## 2025-05-07 RX ADMIN — TAMSULOSIN HYDROCHLORIDE 0.4 MG: 0.4 CAPSULE ORAL at 08:53

## 2025-05-07 RX ADMIN — ALLOPURINOL 200 MG: 100 TABLET ORAL at 08:54

## 2025-05-07 RX ADMIN — SPIRONOLACTONE 25 MG: 25 TABLET ORAL at 08:53

## 2025-05-07 RX ADMIN — LEVOTHYROXINE SODIUM 150 MCG: 0.1 TABLET ORAL at 08:53

## 2025-05-07 RX ADMIN — PETROLATUM: 420 OINTMENT TOPICAL at 08:55

## 2025-05-07 RX ADMIN — ALBUMIN (HUMAN) 25 G: 0.25 INJECTION, SOLUTION INTRAVENOUS at 21:58

## 2025-05-07 RX ADMIN — HYDROCODONE BITARTRATE AND ACETAMINOPHEN 1 TABLET: 5; 325 TABLET ORAL at 21:54

## 2025-05-07 RX ADMIN — POLYETHYLENE GLYCOL 3350 17 G: 17 POWDER, FOR SOLUTION ORAL at 08:54

## 2025-05-07 RX ADMIN — ASPIRIN 81 MG: 81 TABLET, COATED ORAL at 08:53

## 2025-05-07 RX ADMIN — MICONAZOLE NITRATE: 20 OINTMENT TOPICAL at 16:00

## 2025-05-07 RX ADMIN — DOXYCYCLINE HYCLATE 100 MG: 100 CAPSULE ORAL at 21:53

## 2025-05-07 RX ADMIN — SODIUM CHLORIDE 125 MG: 9 INJECTION, SOLUTION INTRAVENOUS at 23:07

## 2025-05-07 RX ADMIN — DOXYCYCLINE HYCLATE 100 MG: 100 CAPSULE ORAL at 09:04

## 2025-05-07 RX ADMIN — CLOBETASOL PROPIONATE: 0.5 CREAM TOPICAL at 22:01

## 2025-05-07 RX ADMIN — INSULIN LISPRO 3 UNITS: 100 INJECTION, SOLUTION INTRAVENOUS; SUBCUTANEOUS at 12:01

## 2025-05-07 ASSESSMENT — PAIN SCALES - GENERAL
PAINLEVEL_OUTOF10: 0
PAINLEVEL_OUTOF10: 10
PAINLEVEL_OUTOF10: 0

## 2025-05-07 ASSESSMENT — PAIN SCALES - WONG BAKER: WONGBAKER_NUMERICALRESPONSE: NO HURT

## 2025-05-07 ASSESSMENT — PAIN DESCRIPTION - LOCATION: LOCATION: BACK;NECK

## 2025-05-07 NOTE — PLAN OF CARE
Problem: Chronic Conditions and Co-morbidities  Goal: Patient's chronic conditions and co-morbidity symptoms are monitored and maintained or improved  5/7/2025 1130 by Alcira Hardy RN  Outcome: Progressing  5/7/2025 0011 by Neyda Sanchez RN  Outcome: Progressing     Problem: Discharge Planning  Goal: Discharge to home or other facility with appropriate resources  5/7/2025 0011 by Neyda Sanchez RN  Outcome: Progressing     Problem: ABCDS Injury Assessment  Goal: Absence of physical injury  5/7/2025 1130 by Alcira Hardy RN  Outcome: Progressing  5/7/2025 0011 by Neyda Sanchez RN  Outcome: Progressing     Problem: Pain  Goal: Verbalizes/displays adequate comfort level or baseline comfort level  5/7/2025 1130 by Alcira Hardy RN  Outcome: Progressing  Flowsheets (Taken 5/7/2025 0895)  Verbalizes/displays adequate comfort level or baseline comfort level: Encourage patient to monitor pain and request assistance  5/7/2025 0011 by Neyda Sanchez RN  Outcome: Progressing     Problem: Safety - Adult  Goal: Free from fall injury  5/7/2025 1130 by Alcira Hardy RN  Outcome: Progressing  5/7/2025 0011 by Neyda Sanchez RN  Outcome: Progressing  5/7/2025 0008 by Neyda Sanchez RN  Outcome: Progressing

## 2025-05-07 NOTE — CARE COORDINATION
Reviewed chart, updated PT 11/24 50ft with max assist X2, met with pt, pt states his legs are feeling better than yesterday, and would like to work with therapy again. Discussed the possible need for DERICK, pt wishes to go home. Spoke with therapy, if they have time they will update pt today.  [facility] following, if home will need [facility] orders(cpa, med compliance, and PT/OT). Will follow for discharge needs.Lina Horvath, MSW, LSW

## 2025-05-07 NOTE — PROGRESS NOTES
Department of Internal Medicine  Nephrology Progress Note    Events reviewed.    SUBJECTIVE: We are following Wander Rocha for RADHA. Patient reports no complaints.     PHYSICAL EXAM:      Vitals:    VITALS:  BP (!) 110/57   Pulse 58   Temp 97 °F (36.1 °C)   Resp 18   Ht 1.702 m (5' 7\")   Wt 101.4 kg (223 lb 8.7 oz)   SpO2 93%   BMI 35.01 kg/m²   24HR INTAKE/OUTPUT:    Intake/Output Summary (Last 24 hours) at 5/7/2025 0921  Last data filed at 5/7/2025 0433  Gross per 24 hour   Intake 720 ml   Output 750 ml   Net -30 ml     Scheduled Meds:   tamsulosin  0.4 mg Oral Daily    clobetasol   Topical BID    white petrolatum   Topical BID    apixaban  5 mg Oral BID    insulin glargine  14 Units SubCUTAneous Nightly    insulin lispro  0-4 Units SubCUTAneous 4x Daily AC & HS    polyethylene glycol  17 g Oral Daily    senna  1 tablet Oral Nightly    doxycycline hyclate  100 mg Oral 2 times per day    vitamin D  2,000 Units Oral Daily    aspirin  81 mg Oral Daily    ferrous sulfate  325 mg Oral Every Other Day    atorvastatin  40 mg Oral Daily    metoprolol succinate  25 mg Oral Daily    sertraline  50 mg Oral Daily    allopurinol  200 mg Oral Daily    [Held by provider] lisinopril  5 mg Oral Daily    sodium chloride flush  5-40 mL IntraVENous 2 times per day    levothyroxine  150 mcg Oral Daily     Continuous Infusions:   dextrose      sodium chloride       PRN Meds:.HYDROcodone 5 mg - acetaminophen, glucose, dextrose bolus **OR** dextrose bolus, glucagon (rDNA), dextrose, sodium chloride flush, sodium chloride, ondansetron, acetaminophen     Constitutional:  Awake, alert, oriented, in NAD  HEENT:  PERRLA, normocephalic, atraumatic  Respiratory: Diminished  Cardiovascular/Edema:  RRR, normal S1, normal S2  Gastrointestinal:  Soft, flat, non-distended, non-tender  Neurologic:  Nonfocal  Skin:  warm, dry, no rashes, no lesions    DATA:    CBC:   Lab Results   Component Value Date/Time    WBC 7.4 05/06/2025 02:46 PM    RBC  interstitial edema and a small left pleural effusion and was given IV Bumex.    Problems resolved:  Hypotonic hyponatremia with hypervolemia, 2/2 acute decompensated heart failure, sodium levels slowly improving up to 136, still with large edema. Resolved  Hyperchloremic metabolic acidosis with respiratory compensation, resolved      IMPRESSION/RECOMMENDATIONS:      RADHA stage I on CKD likely 2/2 hemodynamically mediated acute decompensated heart failure versus ACE inhibition, renal function worsened creatinine at to 2.1 mg/dL, we will hold diuresis for now, give albumin    CKD stage 3a probably 2/2 nephrosclerosis and previous recurrent episodes of ischemic ATN requiring dialysis,  baseline creatinine 1.3 mg/dL    HFpEF 45%, proBNP 2341> 5173, 5387,5204, on metoprolol, holding diuretics  Normocytic anemia, ferritin 112, iron saturation 8%, obtain B12 and folate levels  --------------------------------  Persistent atrial fibrillation, on metoprolol, apixaban  Pacemaker insertion 4/23/2025  Hypothyroidism, on levothyroxine  Type II DM    Plan:    Albumin 25 g IV every 12 hours x 2 doses  Continue to monitor renal function  Continue to monitor sodium level  Continue fluid restriction 1 L  Obtain B12 and folate levels  To start IV iron      Electronically signed by JACQUELINE Martinez CNP on 5/7/2025 at 9:21 AM     I saw and evaluated the patient, performing the key elements of the service. I discussed the findings, assessment and plan with NP and agree with her findings and plans as documented in her note.       Manoj Chavez MD

## 2025-05-07 NOTE — PROGRESS NOTES
Physician Progress Note      PATIENT:               PAM HEADLEY  CSN #:                  [id number]  :                       1955  ADMIT DATE:       2025 8:25 AM  DISCH DATE:  RESPONDING  PROVIDER #:        Luiz Roy MD          QUERY TEXT:    Acute respiratory failure is documented in the medical record in your    progress note. Please provide additional clinical indicators supportive of   your documentation. Or please document if the diagnosis of acute respiratory   failure has been ruled out after study.    The clinical indicators include:  -Acute hypoxic respiratory failure requiring supplemental O2 in the setting of   acute CHF (,  Dr. Roy)  -: Noted to have worsening of decompensated HF including acute hypoxic   respiratory failure. ( Dr. Roy)  -no acute distress (EP )  -unlabored breathing (Nursing head to toe)  -RA - 1L () - 4-4.5L () - 3.5L () - 3L () - 2L () - 3L   () - 2L (-) - (vitals)  -Currently on baseline 2 L of O2 ( Dr Gupta)  -restrictive lung disease with qhs supplemental O2 use (PRN during the day) -   (H&P)  -On 3.5L O2, wears No Home O2.(Case Management )  -O2 3L/NC which is baseline for pt (does not know name of provider.) - (,    )  Options provided:  -- Acute Respiratory Failure as evidenced by, Please document evidence.  -- Acute Respiratory Failure ruled out after study  -- Acute Respiratory Failure ruled out after study and Chronic Respiratory   Failure confirmed  -- Other - I will add my own diagnosis  -- Disagree - Not applicable / Not valid  -- Disagree - Clinically unable to determine / Unknown  -- Refer to Clinical Documentation Reviewer    PROVIDER RESPONSE TEXT:    This patient is in acute respiratory failure as evidenced by oxygen   requirement.    Query created by: Cheyenne Mays on 2025 1:11 PM      Electronically signed by:  Luiz Roy MD 2025 11:47 AM

## 2025-05-07 NOTE — PROGRESS NOTES
INPATIENT CARDIOLOGY FOLLOW-UP    Name: Wander Rocha    Age: 69 y.o.    Date of Admission: 4/23/2025  8:25 AM    Date of Service: 5/7/2025    Primary Cardiologist: Dr. Fontanez    Chief Complaint: Follow-up for ADHF    Interim History:  No new overnight cardiac complaints. Currently with no complaints of CP, SOB, palpitations, dizziness, or lightheadedness.  Dual AV paced rhythm on telemetry    Remains on 2 L of oxygen nasal cannula.  No home oxygen.  3 L negative so far.  Remains off diuretic  Review of Systems:   Negative except as described above    Problem List:  Patient Active Problem List   Diagnosis    Class 2 obesity due to excess calories with serious comorbidity and body mass index (BMI) of 38.0 to 38.9 in adult    Hyperlipidemia    Essential hypertension    Hypothyroidism    NIKO (obstructive sleep apnea)    Restrictive lung disease secondary to obesity    Tachycardia-bradycardia (HCC)    Pacemaker    Persistent atrial fibrillation (HCC)    Chronic obstructive pulmonary disease, unspecified COPD type (HCC)    Acute decompensated heart failure (HCC)    Normocytic anemia    Stage 3b chronic kidney disease (HCC)    Proteinuria due to type 2 diabetes mellitus (HCC)    Gout    Type 2 diabetes mellitus with diabetic neuropathy, with long-term current use of insulin (HCC)    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, unspecified laterality (HCC)    Major depressive disorder, recurrent, unspecified    S/P cardiac pacemaker procedure    Nonischemic cardiomyopathy (HCC)    Heart failure with mid-range ejection fraction (HFmEF) (HCC)    Biventricular CHF (congestive heart failure) (HCC)    Cardiorenal syndrome    Pressure injury of buttock, stage 1    VHD (valvular heart disease)       Current Medications:    Current Facility-Administered Medications:     albumin human 25% IV solution 25 g, 25 g, IntraVENous, BID, Grace French, APRN - CNP    tamsulosin (FLOMAX) capsule 0.4 mg, 0.4 mg, Oral, Daily, Marcelle Damian,

## 2025-05-07 NOTE — PLAN OF CARE
Problem: Chronic Conditions and Co-morbidities  Goal: Patient's chronic conditions and co-morbidity symptoms are monitored and maintained or improved  Outcome: Progressing     Problem: Discharge Planning  Goal: Discharge to home or other facility with appropriate resources  Outcome: Progressing     Problem: ABCDS Injury Assessment  Goal: Absence of physical injury  Outcome: Progressing     Problem: Pain  Goal: Verbalizes/displays adequate comfort level or baseline comfort level  Outcome: Progressing     Problem: Safety - Adult  Goal: Free from fall injury  5/7/2025 0011 by Neyda Sanchez RN  Outcome: Progressing  5/7/2025 0008 by Neyda Sanchez RN  Outcome: Progressing     Problem: Respiratory - Adult  Goal: Achieves optimal ventilation and oxygenation  Outcome: Progressing     Problem: Cardiovascular - Adult  Goal: Maintains optimal cardiac output and hemodynamic stability  Outcome: Progressing     Problem: Metabolic/Fluid and Electrolytes - Adult  Goal: Electrolytes maintained within normal limits  Outcome: Progressing  Goal: Hemodynamic stability and optimal renal function maintained  Outcome: Progressing  Goal: Glucose maintained within prescribed range  Outcome: Progressing     Problem: Musculoskeletal - Adult  Goal: Return mobility to safest level of function  Outcome: Progressing  Goal: Maintain proper alignment of affected body part  Outcome: Progressing  Goal: Return ADL status to a safe level of function  Outcome: Progressing     Problem: Gastrointestinal - Adult  Goal: Minimal or absence of nausea and vomiting  Outcome: Progressing  Goal: Maintains or returns to baseline bowel function  Outcome: Progressing  Goal: Maintains adequate nutritional intake  Outcome: Progressing  Goal: Establish and maintain optimal ostomy function  Outcome: Progressing     Problem: Skin/Tissue Integrity  Goal: Skin integrity remains intact  Description: 1.  Monitor for areas of redness and/or skin breakdown2.  Assess

## 2025-05-07 NOTE — PROGRESS NOTES
[facility]  Internal Medicine Residency Program  Progress Note - House Team 1    Patient:  Wander Rocha 69 y.o. male MRN: [id number]     Date of Service: 5/7/2025     CC: Transferred to  house team to service on/30/25 after upgrading his pacemaker to a CRT device   Overnight events:  No overnight events     Subjective     Patient was seen at bedside this morning. He is alert and oriented x 3. Bilateral lower extremity and sacral edema is improving. There is redness on legs bilaterally likely due to stasis dermatitis. Currently on baseline 2 L of O2, denies worsening shortness of breath. Bowel and bladder habits are normal. Appetite is normal.     Objective     Physical Exam:  Vitals: /60   Pulse 61   Temp 97.9 °F (36.6 °C)   Resp 16   Ht 1.702 m (5' 7\")   Wt 101.4 kg (223 lb 8.7 oz)   SpO2 91%   BMI 35.01 kg/m²     I & O - 24hr: No intake/output data recorded.   General Appearance: alert, appears stated age, and cooperative  HEENT:  Head: Normocephalic, no lesions, without obvious abnormality.  Neck: no adenopathy, no carotid bruit, no JVD, supple, symmetrical, trachea midline, and thyroid not enlarged, symmetric, no tenderness/mass/nodules  Lung: rales bibasilar and bilaterally, on 2 L of O2 by nasal cannula   Heart: regular rate and rhythm, S1, S2 normal, no murmur, click, rub or gallop  Abdomen: soft, non-tender; bowel sounds normal; no masses,  no organomegaly  Extremities:  edema 2+ bilateral lower extremities, sacral edema   Musculokeletal: No joint swelling, no muscle tenderness. ROM normal in all joints of extremities.   Neurologic: Mental status: Alert, oriented, thought content appropriate    Subjective  Pertinent Labs & Imaging Studies     CBC with Differential:    Lab Results   Component Value Date/Time    WBC 7.4 05/06/2025 02:46 PM    RBC 2.85 05/06/2025 02:46 PM    HGB 8.9 05/06/2025 02:46 PM    HCT 26.8 05/06/2025 02:46 PM     05/06/2025 02:46 PM    MCV

## 2025-05-07 NOTE — PROGRESS NOTES
Heart rate 58 during  morning medication metoprolol held for heart rate under 60, physician notified and parameters orders placed

## 2025-05-08 LAB
ALBUMIN SERPL-MCNC: 4 G/DL (ref 3.5–5.2)
ALP SERPL-CCNC: 107 U/L (ref 40–129)
ALT SERPL-CCNC: 15 U/L (ref 0–50)
ANION GAP SERPL CALCULATED.3IONS-SCNC: 15 MMOL/L (ref 7–16)
AST SERPL-CCNC: 23 U/L (ref 0–50)
BILIRUB SERPL-MCNC: 1 MG/DL (ref 0–1.2)
BUN SERPL-MCNC: 98 MG/DL (ref 8–23)
CALCIUM SERPL-MCNC: 9.6 MG/DL (ref 8.8–10.2)
CHLORIDE SERPL-SCNC: 92 MMOL/L (ref 98–107)
CO2 SERPL-SCNC: 28 MMOL/L (ref 22–29)
CREAT SERPL-MCNC: 2 MG/DL (ref 0.7–1.2)
EKG ATRIAL RATE: 59 BPM
EKG P-R INTERVAL: 186 MS
EKG Q-T INTERVAL: 482 MS
EKG QRS DURATION: 166 MS
EKG QTC CALCULATION (BAZETT): 485 MS
EKG R AXIS: 114 DEGREES
EKG T AXIS: 49 DEGREES
EKG VENTRICULAR RATE: 61 BPM
FOLATE SERPL-MCNC: 8.4 NG/ML (ref 4.6–34.8)
GFR, ESTIMATED: 36 ML/MIN/1.73M2
GLUCOSE BLD-MCNC: 149 MG/DL (ref 74–99)
GLUCOSE BLD-MCNC: 180 MG/DL (ref 74–99)
GLUCOSE BLD-MCNC: 199 MG/DL (ref 74–99)
GLUCOSE BLD-MCNC: 219 MG/DL (ref 74–99)
GLUCOSE SERPL-MCNC: 154 MG/DL (ref 74–99)
POTASSIUM SERPL-SCNC: 4.3 MMOL/L (ref 3.5–5.1)
PROT SERPL-MCNC: 7.2 G/DL (ref 6.4–8.3)
SODIUM SERPL-SCNC: 135 MMOL/L (ref 136–145)
VIT B12 SERPL-MCNC: 687 PG/ML (ref 232–1245)

## 2025-05-08 PROCEDURE — 80053 COMPREHEN METABOLIC PANEL: CPT

## 2025-05-08 PROCEDURE — 2580000003 HC RX 258: Performed by: INTERNAL MEDICINE

## 2025-05-08 PROCEDURE — 82607 VITAMIN B-12: CPT

## 2025-05-08 PROCEDURE — 6370000000 HC RX 637 (ALT 250 FOR IP): Performed by: INTERNAL MEDICINE

## 2025-05-08 PROCEDURE — 2700000000 HC OXYGEN THERAPY PER DAY

## 2025-05-08 PROCEDURE — 6370000000 HC RX 637 (ALT 250 FOR IP): Performed by: NURSE PRACTITIONER

## 2025-05-08 PROCEDURE — 6370000000 HC RX 637 (ALT 250 FOR IP)

## 2025-05-08 PROCEDURE — 97535 SELF CARE MNGMENT TRAINING: CPT

## 2025-05-08 PROCEDURE — 82746 ASSAY OF FOLIC ACID SERUM: CPT

## 2025-05-08 PROCEDURE — 99232 SBSQ HOSP IP/OBS MODERATE 35: CPT | Performed by: INTERNAL MEDICINE

## 2025-05-08 PROCEDURE — 6360000002 HC RX W HCPCS: Performed by: INTERNAL MEDICINE

## 2025-05-08 PROCEDURE — 97530 THERAPEUTIC ACTIVITIES: CPT

## 2025-05-08 PROCEDURE — 36415 COLL VENOUS BLD VENIPUNCTURE: CPT

## 2025-05-08 PROCEDURE — 82962 GLUCOSE BLOOD TEST: CPT

## 2025-05-08 PROCEDURE — 99233 SBSQ HOSP IP/OBS HIGH 50: CPT | Performed by: INTERNAL MEDICINE

## 2025-05-08 PROCEDURE — 2500000003 HC RX 250 WO HCPCS

## 2025-05-08 PROCEDURE — 2060000000 HC ICU INTERMEDIATE R&B

## 2025-05-08 PROCEDURE — 2500000003 HC RX 250 WO HCPCS: Performed by: INTERNAL MEDICINE

## 2025-05-08 PROCEDURE — 6370000000 HC RX 637 (ALT 250 FOR IP): Performed by: STUDENT IN AN ORGANIZED HEALTH CARE EDUCATION/TRAINING PROGRAM

## 2025-05-08 RX ORDER — BUMETANIDE 0.25 MG/ML
1 INJECTION, SOLUTION INTRAMUSCULAR; INTRAVENOUS ONCE
Status: COMPLETED | OUTPATIENT
Start: 2025-05-08 | End: 2025-05-08

## 2025-05-08 RX ADMIN — PETROLATUM: 420 OINTMENT TOPICAL at 20:52

## 2025-05-08 RX ADMIN — CLOBETASOL PROPIONATE: 0.5 CREAM TOPICAL at 20:52

## 2025-05-08 RX ADMIN — ASPIRIN 81 MG: 81 TABLET, COATED ORAL at 10:33

## 2025-05-08 RX ADMIN — INSULIN LISPRO 1 UNITS: 100 INJECTION, SOLUTION INTRAVENOUS; SUBCUTANEOUS at 20:50

## 2025-05-08 RX ADMIN — APIXABAN 5 MG: 5 TABLET, FILM COATED ORAL at 20:49

## 2025-05-08 RX ADMIN — SERTRALINE HYDROCHLORIDE 50 MG: 50 TABLET ORAL at 10:33

## 2025-05-08 RX ADMIN — POLYETHYLENE GLYCOL 3350 17 G: 17 POWDER, FOR SOLUTION ORAL at 10:34

## 2025-05-08 RX ADMIN — CLOBETASOL PROPIONATE: 0.5 CREAM TOPICAL at 10:46

## 2025-05-08 RX ADMIN — SODIUM CHLORIDE, PRESERVATIVE FREE 10 ML: 5 INJECTION INTRAVENOUS at 10:34

## 2025-05-08 RX ADMIN — MICONAZOLE NITRATE: 20.6 POWDER TOPICAL at 21:52

## 2025-05-08 RX ADMIN — HYDROCODONE BITARTRATE AND ACETAMINOPHEN 1 TABLET: 5; 325 TABLET ORAL at 23:49

## 2025-05-08 RX ADMIN — LEVOTHYROXINE SODIUM 150 MCG: 0.1 TABLET ORAL at 10:32

## 2025-05-08 RX ADMIN — SODIUM CHLORIDE 125 MG: 9 INJECTION, SOLUTION INTRAVENOUS at 12:39

## 2025-05-08 RX ADMIN — INSULIN LISPRO 1 UNITS: 100 INJECTION, SOLUTION INTRAVENOUS; SUBCUTANEOUS at 17:40

## 2025-05-08 RX ADMIN — BUMETANIDE 1 MG: 0.25 INJECTION INTRAMUSCULAR; INTRAVENOUS at 17:40

## 2025-05-08 RX ADMIN — FERROUS SULFATE TAB 325 MG (65 MG ELEMENTAL FE) 325 MG: 325 (65 FE) TAB at 10:33

## 2025-05-08 RX ADMIN — SENNOSIDES 8.6 MG: 8.6 TABLET, FILM COATED ORAL at 20:49

## 2025-05-08 RX ADMIN — MICONAZOLE NITRATE: 20 OINTMENT TOPICAL at 10:36

## 2025-05-08 RX ADMIN — INSULIN GLARGINE 14 UNITS: 100 INJECTION, SOLUTION SUBCUTANEOUS at 00:21

## 2025-05-08 RX ADMIN — ALLOPURINOL 200 MG: 100 TABLET ORAL at 10:33

## 2025-05-08 RX ADMIN — INSULIN LISPRO 1 UNITS: 100 INJECTION, SOLUTION INTRAVENOUS; SUBCUTANEOUS at 12:34

## 2025-05-08 RX ADMIN — Medication 2000 UNITS: at 10:33

## 2025-05-08 RX ADMIN — INSULIN GLARGINE 14 UNITS: 100 INJECTION, SOLUTION SUBCUTANEOUS at 20:49

## 2025-05-08 RX ADMIN — METOPROLOL SUCCINATE 25 MG: 25 TABLET, EXTENDED RELEASE ORAL at 10:33

## 2025-05-08 RX ADMIN — ATORVASTATIN CALCIUM 40 MG: 40 TABLET, FILM COATED ORAL at 10:33

## 2025-05-08 RX ADMIN — EMPAGLIFLOZIN 10 MG: 10 TABLET, FILM COATED ORAL at 14:30

## 2025-05-08 RX ADMIN — SENNOSIDES 8.6 MG: 8.6 TABLET, FILM COATED ORAL at 10:33

## 2025-05-08 RX ADMIN — PETROLATUM: 420 OINTMENT TOPICAL at 10:37

## 2025-05-08 RX ADMIN — SODIUM CHLORIDE, PRESERVATIVE FREE 10 ML: 5 INJECTION INTRAVENOUS at 20:52

## 2025-05-08 RX ADMIN — TAMSULOSIN HYDROCHLORIDE 0.4 MG: 0.4 CAPSULE ORAL at 10:33

## 2025-05-08 RX ADMIN — APIXABAN 5 MG: 5 TABLET, FILM COATED ORAL at 10:33

## 2025-05-08 ASSESSMENT — PAIN DESCRIPTION - LOCATION: LOCATION: BACK

## 2025-05-08 ASSESSMENT — PAIN SCALES - GENERAL: PAINLEVEL_OUTOF10: 9

## 2025-05-08 ASSESSMENT — PAIN DESCRIPTION - DESCRIPTORS: DESCRIPTORS: ACHING;SORE;SPASM

## 2025-05-08 ASSESSMENT — PAIN DESCRIPTION - ORIENTATION: ORIENTATION: LOWER

## 2025-05-08 ASSESSMENT — PAIN - FUNCTIONAL ASSESSMENT: PAIN_FUNCTIONAL_ASSESSMENT: ACTIVITIES ARE NOT PREVENTED

## 2025-05-08 NOTE — CARE COORDINATION
Reviewed chart, pt worked with therapy today 15/24 50 ft, unsteady. Met with pt and spouse, pt is agreeable for [facility], and would like [facility]. Envelope and ambullete form in soft chart. Will need precert and completed PASRR once accepting facility.Lina Horvath, MSW, LSW

## 2025-05-08 NOTE — PROGRESS NOTES
Comprehensive Nutrition Assessment    Type and Reason for Visit:  Reassess    Nutrition Recommendations/Plan:   Continue current diet and ONS     Malnutrition Assessment:  Malnutrition Status:  No malnutrition (05/08/25 1356)    Context:  Acute Illness     Findings of the 6 clinical characteristics of malnutrition:  Energy Intake:  75% or less of estimated energy requirements for 7 or more days (overall since admit)  Weight Loss:  Unable to assess (d/t fluid shifts)     Body Fat Loss:  No body fat loss     Muscle Mass Loss:  No muscle mass loss    Fluid Accumulation:  No fluid accumulation     Strength:  Not Performed    Nutrition Assessment:    Pt stable from a nutritional standpoint w/ average 75% PO intakes at this time. Admit d/t persistent Afib s/p pacemaker upgrade on 4/23. Noted RADHA on CKD, hyponatremia on 1000ml fluid restriction. PMHx CHF, DM, COPD, chronic anemia, hypothyroidism, gout. Continue current ONS and will monitor.    Nutrition Related Findings:    A&Ox4, active BS, soft abd, +3 edema, -I/Os, hyperglycemia, hyponatremia Wound Type: Surgical Incision       Current Nutrition Intake & Therapies:    Average Meal Intake: 51-75% (average)  Average Supplements Intake: 51-75%  ADULT ORAL NUTRITION SUPPLEMENT; Lunch; Frozen Oral Supplement  ADULT ORAL NUTRITION SUPPLEMENT; Dinner; Fortified Gelatin Oral Supplement  ADULT DIET; Regular; Low Fat/Low Chol/High Fiber/2 gm Na; 1000 ml    Anthropometric Measures:  Height: 170.2 cm (5' 7\")  Ideal Body Weight (IBW): 148 lbs (67 kg)    Admission Body Weight: 99.3 kg (219 lb) (4/25, standing scale ; admission weight)  Current Body Weight: 101.2 kg (223 lb) (5/6 bed scale), 148 % IBW. Weight Source: Standing scale  Current BMI (kg/m2): 34.9  Usual Body Weight:  (UTO ; EMR shows past weights of 211# actual on 3/14/25 and 201# actual on 8/16/24)                          BMI Categories: Obese Class 1 (BMI 30.0-34.9)    Estimated Daily Nutrient Needs:  Energy

## 2025-05-08 NOTE — PROGRESS NOTES
Department of Internal Medicine  Nephrology Progress Note    Events reviewed.    SUBJECTIVE: We are following Wander Rocha for RADHA. Patient reports no complaints.     PHYSICAL EXAM:      Vitals:    VITALS:  /77   Pulse 68   Temp 97.3 °F (36.3 °C) (Temporal)   Resp 18   Ht 1.702 m (5' 7\")   Wt 101.4 kg (223 lb 8.7 oz)   SpO2 100%   BMI 35.01 kg/m²   24HR INTAKE/OUTPUT:    Intake/Output Summary (Last 24 hours) at 5/8/2025 0915  Last data filed at 5/8/2025 0445  Gross per 24 hour   Intake 124 ml   Output 1030 ml   Net -906 ml     Scheduled Meds:   miconazole nitrate   Topical BID    senna  1 tablet Oral BID    ferric gluconate (FERRLECIT) 125 mg in sodium chloride 0.9 % 100 mL IVPB  125 mg IntraVENous Daily    tamsulosin  0.4 mg Oral Daily    clobetasol   Topical BID    white petrolatum   Topical BID    apixaban  5 mg Oral BID    insulin glargine  14 Units SubCUTAneous Nightly    insulin lispro  0-4 Units SubCUTAneous 4x Daily AC & HS    polyethylene glycol  17 g Oral Daily    vitamin D  2,000 Units Oral Daily    aspirin  81 mg Oral Daily    ferrous sulfate  325 mg Oral Every Other Day    atorvastatin  40 mg Oral Daily    metoprolol succinate  25 mg Oral Daily    sertraline  50 mg Oral Daily    allopurinol  200 mg Oral Daily    [Held by provider] lisinopril  5 mg Oral Daily    sodium chloride flush  5-40 mL IntraVENous 2 times per day    levothyroxine  150 mcg Oral Daily     Continuous Infusions:   dextrose      sodium chloride       PRN Meds:.HYDROcodone 5 mg - acetaminophen, glucose, dextrose bolus **OR** dextrose bolus, glucagon (rDNA), dextrose, sodium chloride flush, sodium chloride, ondansetron, acetaminophen     Constitutional:  Awake, alert, oriented, in NAD  HEENT:  PERRLA, normocephalic, atraumatic  Respiratory: Diminished  Cardiovascular/Edema:  RRR, normal S1, normal S2  Gastrointestinal:  Soft, flat, non-distended, non-tender  Neurologic:  Nonfocal  Skin:  warm, dry, no rashes, no

## 2025-05-08 NOTE — PLAN OF CARE
Problem: Chronic Conditions and Co-morbidities  Goal: Patient's chronic conditions and co-morbidity symptoms are monitored and maintained or improved  5/8/2025 1358 by Maryann Hidalgo RN  Outcome: Progressing  5/8/2025 0716 by Neyda Sanchez RN  Outcome: Progressing     Problem: Discharge Planning  Goal: Discharge to home or other facility with appropriate resources  5/8/2025 1358 by Maryann Hidalgo RN  Outcome: Progressing  5/8/2025 0716 by Neyda Sanchez RN  Outcome: Progressing     Problem: ABCDS Injury Assessment  Goal: Absence of physical injury  5/8/2025 1358 by Maryann Hidalgo RN  Outcome: Progressing  5/8/2025 0716 by Neyda Sanchez RN  Outcome: Progressing     Problem: Pain  Goal: Verbalizes/displays adequate comfort level or baseline comfort level  5/8/2025 1358 by Maryann Hidalgo RN  Outcome: Progressing  5/8/2025 0716 by Neyda Sanchez RN  Outcome: Progressing     Problem: Safety - Adult  Goal: Free from fall injury  5/8/2025 1358 by Maryann Hidalgo RN  Outcome: Progressing  5/8/2025 0716 by Neyda Sanchez RN  Outcome: Progressing     Problem: Respiratory - Adult  Goal: Achieves optimal ventilation and oxygenation  5/8/2025 1358 by Maryann Hidalgo RN  Outcome: Progressing  5/8/2025 0716 by Neyda Sanchez RN  Outcome: Progressing     Problem: Cardiovascular - Adult  Goal: Maintains optimal cardiac output and hemodynamic stability  5/8/2025 1358 by Maryann Hidalgo RN  Outcome: Progressing  5/8/2025 0716 by Neyda Sanchez RN  Outcome: Progressing     Problem: Metabolic/Fluid and Electrolytes - Adult  Goal: Electrolytes maintained within normal limits  5/8/2025 1358 by Maryann Hidalgo RN  Outcome: Progressing  5/8/2025 0716 by Neyda Sanchez RN  Outcome: Progressing  Goal: Hemodynamic stability and optimal renal function maintained  5/8/2025 1358 by Maryann Hidalgo RN  Outcome: Progressing  5/8/2025 0716 by Neyda Sanchez RN  Outcome: Progressing  Goal: Glucose maintained within    Problem: Nutrition Deficit:  Goal: Optimize nutritional status  5/8/2025 1358 by Maryann Hidalgo, RN  Outcome: Progressing  Flowsheets (Taken 5/8/2025 1347 by Amanda Garvin, MS, RD, LD)  Nutrient intake appropriate for improving, restoring, or maintaining nutritional needs: Recommend appropriate diets, oral nutritional supplements, and vitamin/mineral supplements  5/8/2025 0716 by Neyda Sanchez, RN  Outcome: Progressing

## 2025-05-08 NOTE — PROGRESS NOTES
[facility]  Internal Medicine Residency Program  Progress Note - House Team 1    Patient:  Wander Rocha 69 y.o. male MRN: [id number]     Date of Service: 5/8/2025     CC: Transferred to  house team to service on/30/25 after upgrading his pacemaker to a CRT device   Overnight events:  No overnight events     Subjective     Patient was seen at bedside this morning. He is alert and oriented x 3. Redness on bilateral lower extremities are improving.  Bilateral lower extremity swelling has improved. Currently on baseline 2 L of O2, denies worsening shortness of breath. Bowel and bladder habits are normal. Appetite is normal.     Called patients spouse Petra and updated on patients current hospital stay. She would prefer patient goes to acute rehab as she is not home 3 days of the week. She states she prefers [facility] for rehab. Informed case management.     Objective     Physical Exam:  Vitals: BP (!) 110/52   Pulse 62   Temp 97.7 °F (36.5 °C) (Temporal)   Resp 18   Ht 1.702 m (5' 7\")   Wt 101.4 kg (223 lb 8.7 oz)   SpO2 90%   BMI 35.01 kg/m²     I & O - 24hr: No intake/output data recorded.   General Appearance: alert, appears stated age, and cooperative  HEENT:  Head: Normocephalic, no lesions, without obvious abnormality.  Neck: no adenopathy, no carotid bruit, no JVD, supple, symmetrical, trachea midline, and thyroid not enlarged, symmetric, no tenderness/mass/nodules  Lung: rales bibasilar and bilaterally, on 2 L of O2 by nasal cannula   Heart: regular rate and rhythm, S1, S2 normal, no murmur, click, rub or gallop  Abdomen: soft, non-tender; bowel sounds normal; no masses,  no organomegaly  Extremities:  edema 2+ bilateral lower extremities, sacral edema   Musculokeletal: No joint swelling, no muscle tenderness. ROM normal in all joints of extremities.   Neurologic: Mental status: Alert, oriented, thought content appropriate    Subjective  Pertinent Labs & Imaging Studies      CBC with Differential:    Lab Results   Component Value Date/Time    WBC 7.4 05/06/2025 02:46 PM    RBC 2.85 05/06/2025 02:46 PM    HGB 8.9 05/06/2025 02:46 PM    HCT 26.8 05/06/2025 02:46 PM     05/06/2025 02:46 PM    MCV 94.0 05/06/2025 02:46 PM    MCH 31.2 05/06/2025 02:46 PM    MCHC 33.2 05/06/2025 02:46 PM    RDW 16.5 05/06/2025 02:46 PM    METASPCT 2 05/02/2025 02:37 PM    LYMPHOPCT 9 05/06/2025 02:46 PM    MONOPCT 12 05/06/2025 02:46 PM    MYELOPCT 1 05/02/2025 02:37 PM    EOSPCT 5 05/06/2025 02:46 PM    BASOPCT 0 05/06/2025 02:46 PM    MONOSABS 0.90 05/06/2025 02:46 PM    LYMPHSABS 0.66 05/06/2025 02:46 PM    EOSABS 0.35 05/06/2025 02:46 PM    BASOSABS 0.03 05/06/2025 02:46 PM     CMP:    Lab Results   Component Value Date/Time     05/07/2025 05:51 PM    K 4.2 05/07/2025 05:51 PM    K 5.1 01/08/2023 05:35 AM    CL 90 05/07/2025 05:51 PM    CO2 29 05/07/2025 05:51 PM    BUN 98 05/07/2025 05:51 PM    CREATININE 2.0 05/07/2025 05:51 PM    GFRAA >60 10/03/2022 10:55 AM    LABGLOM 35 05/07/2025 05:51 PM    LABGLOM 60 04/19/2024 10:45 AM    GLUCOSE 139 05/07/2025 05:51 PM    GLUCOSE 133 04/18/2012 08:43 AM    CALCIUM 9.6 05/07/2025 05:51 PM    BILITOT 0.9 05/03/2025 08:33 AM    ALKPHOS 136 05/03/2025 08:33 AM    AST 33 05/03/2025 08:33 AM    ALT 24 05/03/2025 08:33 AM     Vascular duplex lower extremity venous bilateral   Final Result   1.  No sonographic evidence of DVT in the bilateral lower extremities.         XR CHEST PORTABLE   Final Result   Stable appearance of the chest compared to 04/29/2025         XR CHEST (2 VW)   Final Result   Bilateral pleural effusions with prominent pulmonary vascularity and moderate   cardiomegaly. Findings are consistent with congestive heart failure.         XR ABDOMEN (KUB) (SINGLE AP VIEW)   Final Result   1. Moderate amount of gas and stool throughout the colon without evidence of   obstruction and no evidence of a new lead AS.   2. Mild blunting of the

## 2025-05-08 NOTE — PROGRESS NOTES
INPATIENT CARDIOLOGY FOLLOW-UP    Name: Wander Rocha    Age: 69 y.o.    Date of Admission: 4/23/2025  8:25 AM    Date of Service: 5/8/2025    Primary Cardiologist: Dr. Fontanez    Chief Complaint: Follow-up for ADHF    Interim History:  No new overnight cardiac complaints. Currently with no complaints of CP, SOB, palpitations, dizziness, or lightheadedness.  Dual AV paced rhythm on telemetry    Remains on 2 L of oxygen nasal cannula.  No home oxygen.  3 L negative so far.  Remains off diuretic  Review of Systems:   Negative except as described above    Problem List:  Patient Active Problem List   Diagnosis    Class 2 obesity due to excess calories with serious comorbidity and body mass index (BMI) of 38.0 to 38.9 in adult    Hyperlipidemia    Essential hypertension    Hypothyroidism    NIKO (obstructive sleep apnea)    Restrictive lung disease secondary to obesity    Tachycardia-bradycardia (HCC)    Pacemaker    Persistent atrial fibrillation (HCC)    Chronic obstructive pulmonary disease, unspecified COPD type (HCC)    Acute decompensated heart failure (HCC)    Normocytic anemia    Stage 3b chronic kidney disease ([facility])    Proteinuria due to type 2 diabetes mellitus (HCC)    Gout    Type 2 diabetes mellitus with diabetic neuropathy, with long-term current use of insulin (HCC)    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, unspecified laterality (HCC)    Major depressive disorder, recurrent, unspecified    S/P cardiac pacemaker procedure    Nonischemic cardiomyopathy (HCC)    Heart failure with mid-range ejection fraction (HFmEF) ([facility])    Biventricular CHF (congestive heart failure) ([facility])    Cardiorenal syndrome    Pressure injury of buttock, stage 1    VHD (valvular heart disease)       Current Medications:    Current Facility-Administered Medications:     miconazole nitrate 2 % ointment, , Topical, BID, Xenia Moyer MD, Given at 05/07/25 2200    senna (SENOKOT) tablet 8.6 mg, 1 tablet, Oral, BID, Haqqani, Rida,

## 2025-05-08 NOTE — PLAN OF CARE
Problem: Chronic Conditions and Co-morbidities  Goal: Patient's chronic conditions and co-morbidity symptoms are monitored and maintained or improved  Outcome: Progressing     Problem: Discharge Planning  Goal: Discharge to home or other facility with appropriate resources  Outcome: Progressing     Problem: ABCDS Injury Assessment  Goal: Absence of physical injury  Outcome: Progressing     Problem: Pain  Goal: Verbalizes/displays adequate comfort level or baseline comfort level  Outcome: Progressing     Problem: Safety - Adult  Goal: Free from fall injury  Outcome: Progressing     Problem: Respiratory - Adult  Goal: Achieves optimal ventilation and oxygenation  Outcome: Progressing     Problem: Cardiovascular - Adult  Goal: Maintains optimal cardiac output and hemodynamic stability  Outcome: Progressing     Problem: Metabolic/Fluid and Electrolytes - Adult  Goal: Electrolytes maintained within normal limits  Outcome: Progressing  Goal: Hemodynamic stability and optimal renal function maintained  Outcome: Progressing  Goal: Glucose maintained within prescribed range  Outcome: Progressing     Problem: Musculoskeletal - Adult  Goal: Return mobility to safest level of function  Outcome: Progressing  Goal: Maintain proper alignment of affected body part  Outcome: Progressing  Goal: Return ADL status to a safe level of function  Outcome: Progressing     Problem: Gastrointestinal - Adult  Goal: Minimal or absence of nausea and vomiting  Outcome: Progressing  Goal: Maintains or returns to baseline bowel function  Outcome: Progressing  Goal: Maintains adequate nutritional intake  Outcome: Progressing  Goal: Establish and maintain optimal ostomy function  Outcome: Progressing     Problem: Skin/Tissue Integrity  Goal: Skin integrity remains intact  Description: 1.  Monitor for areas of redness and/or skin breakdown2.  Assess vascular access sites hourly3.  Every 4-6 hours minimum:  Change oxygen saturation probe site4.

## 2025-05-08 NOTE — PROGRESS NOTES
Occupational Therapy  OCCUPATIONAL THERAPY INITIAL EVALUATION    [facility]  [address] [place], [place]      Date:2025                                                Patient Name: Wander Rocha  MRN: [id number]  : 1955  Room: [id number]    Evaluating OT: Jason Macedo OTR/L #[id number]     Referring Provider: Miracle Wood MD   Specific Provider Orders/Date: OT eval and treat 25    Diagnosis: Tachycardia-bradycardia (HCC) [I49.5]  S/P cardiac pacemaker procedure [Z95.0]  Heart failure with mid-range ejection fraction (HFmEF) (HCC) [I50.22]   Pt admitted to hospital for follow up and management of pacemaker    Surgery / Procedure: 25 Biventricular pacemaker upgrade     Pertinent Medical History:  has a past medical history of RADHA (acute kidney injury), Atrial fibrillation (HCC), Carpal tunnel syndrome, Colorectal polyps, Diverticula of colon, Encounter regarding vascular access for dialysis for ESRD (HCC), Hyperlipidemia, Hypertension, Hypothyroidism, Lung nodule, Lung nodules, Nocturnal hypoxemia due to obesity, Obesity, NIKO (obstructive sleep apnea), Osteoarthritis, Pacemaker, Pacemaker, Pinched nerve, Restrictive lung disease secondary to obesity, S/P laminectomy with spinal fusion, Sinus node dysfunction (HCC), Skin lesion of face, and Type II or unspecified type diabetes mellitus without mention of complication, not stated as uncontrolled.     Precautions:  Fall Risk, pacemaker precautions, O2, , + BALL    Assessment of current deficits    [x] Functional mobility  [x]ADLs  [x] Strength               []Cognition    [x] Functional transfers   [x] IADLs         [x] Safety Awareness   [x]Endurance    [] Fine Coordination              [x] Balance      [] Vision/perception   []Sensation     []Gross Motor Coordination  [] ROM  [] Delirium                   [] Motor Control     OT PLAN OF CARE   OT POC based on physician orders, patient

## 2025-05-08 NOTE — PROGRESS NOTES
Physical Therapy  Physical Therapy Treatment     Name: Wander Rocha  : 1955  MRN: [id number]      Date of Service: 2025    Evaluating PT:  Vik Guzman PT, DPT    Room #:  7415/7415-A  Diagnosis:  Tachycardia-bradycardia (HCC) [I49.5]  S/P cardiac pacemaker procedure [Z95.0]  Heart failure with mid-range ejection fraction (HFmEF) (HCC) [I50.22]  PMHx/PSHx:  afib, HTN, HLD, DM II, CKD, anemia  Procedure/Surgery:  s/p biventricular pacemaker upgrade   Precautions:  pacemaker, fall risk, O2, cognition  Equipment Owned: cane, ww, O2 (3L at night)  Equipment Needs:  TBD    SUBJECTIVE:    Pt lives with spouse in a 2 story home with 3 steps to enter and B handrail.  Bed/bath is on 2nd floor, pt sleeps in recliner on 1st floor.  Pt ambulated with no AD PTA.    OBJECTIVE:   Initial Evaluation  Date: 25 Treatment Date: 25 Short Term/ Long Term   Goals   AM-PAC 6 Clicks     Was pt agreeable to Eval/treatment? yes yes    Does pt have pain? 8/10 generalized BLE pain     Bed Mobility  Rolling: NT  Supine to sit: NT  Sit to supine: NT  Scooting: SBA NT Rolling: mod I  Supine to sit: mod I  Sit to supine: mod I  Scooting: mod I   Transfers Sit to stand: min A  Stand to sit: min A  Stand pivot: min A with HHA Sit to stand: Mod A  Stand to sit: modA  Stand pivot: Lencho WW Sit to stand: mod I  Stand to sit: mod I  Stand pivot: mod I with AAD   Ambulation    20'x4 with HHA min A 50' with WW Lencho 150'+ with AAD mod I   Stair negotiation: ascended and descended  NT NT 10 steps with 1 handrail mod I     Strength/ROM:   BLE grossly 4/5  BLE AROM WFL    Balance:   Static Sitting: SBA  Dynamic Sitting: SBA  Static Standing: CGA with HHA  Dynamic Standing: min A with HHA    Pt is A & O x 3  Sensation:  Pt denies numbness and tingling to extremities  Edema:  unremarkable    Vitals:  SPO2 2L/min with mobility: 80%   SPO2 4L/min ~1-2 minutes: 90%  SPO2 2L/min post session: 93%    Patient education  Pt educated

## 2025-05-09 VITALS
HEART RATE: 59 BPM | SYSTOLIC BLOOD PRESSURE: 107 MMHG | BODY MASS INDEX: 33.13 KG/M2 | DIASTOLIC BLOOD PRESSURE: 54 MMHG | RESPIRATION RATE: 18 BRPM | TEMPERATURE: 97.1 F | HEIGHT: 67 IN | OXYGEN SATURATION: 99 % | WEIGHT: 211.1 LBS

## 2025-05-09 LAB
ANION GAP SERPL CALCULATED.3IONS-SCNC: 15 MMOL/L (ref 7–16)
BUN SERPL-MCNC: 96 MG/DL (ref 8–23)
CALCIUM SERPL-MCNC: 10 MG/DL (ref 8.8–10.2)
CHLORIDE SERPL-SCNC: 92 MMOL/L (ref 98–107)
CO2 SERPL-SCNC: 30 MMOL/L (ref 22–29)
CREAT SERPL-MCNC: 1.9 MG/DL (ref 0.7–1.2)
ERYTHROCYTE [DISTWIDTH] IN BLOOD BY AUTOMATED COUNT: 16.2 % (ref 11.5–15)
GFR, ESTIMATED: 37 ML/MIN/1.73M2
GLUCOSE BLD-MCNC: 115 MG/DL (ref 74–99)
GLUCOSE BLD-MCNC: 156 MG/DL (ref 74–99)
GLUCOSE BLD-MCNC: 199 MG/DL (ref 74–99)
GLUCOSE SERPL-MCNC: 121 MG/DL (ref 74–99)
HCT VFR BLD AUTO: 28.5 % (ref 37–54)
HGB BLD-MCNC: 9 G/DL (ref 12.5–16.5)
MAGNESIUM SERPL-MCNC: 2 MG/DL (ref 1.6–2.4)
MCH RBC QN AUTO: 30.3 PG (ref 26–35)
MCHC RBC AUTO-ENTMCNC: 31.6 G/DL (ref 32–34.5)
MCV RBC AUTO: 96 FL (ref 80–99.9)
PH VENOUS: 7.39 (ref 7.35–7.45)
PLATELET # BLD AUTO: 191 K/UL (ref 130–450)
PMV BLD AUTO: 9.3 FL (ref 7–12)
POTASSIUM SERPL-SCNC: 4.3 MMOL/L (ref 3.5–5.1)
RBC # BLD AUTO: 2.97 M/UL (ref 3.8–5.8)
SODIUM SERPL-SCNC: 136 MMOL/L (ref 136–145)
WBC OTHER # BLD: 7.4 K/UL (ref 4.5–11.5)

## 2025-05-09 PROCEDURE — 2580000003 HC RX 258: Performed by: INTERNAL MEDICINE

## 2025-05-09 PROCEDURE — 82962 GLUCOSE BLOOD TEST: CPT

## 2025-05-09 PROCEDURE — 6370000000 HC RX 637 (ALT 250 FOR IP): Performed by: NURSE PRACTITIONER

## 2025-05-09 PROCEDURE — 6370000000 HC RX 637 (ALT 250 FOR IP)

## 2025-05-09 PROCEDURE — 99233 SBSQ HOSP IP/OBS HIGH 50: CPT | Performed by: INTERNAL MEDICINE

## 2025-05-09 PROCEDURE — 2500000003 HC RX 250 WO HCPCS: Performed by: INTERNAL MEDICINE

## 2025-05-09 PROCEDURE — 6360000002 HC RX W HCPCS: Performed by: INTERNAL MEDICINE

## 2025-05-09 PROCEDURE — 2700000000 HC OXYGEN THERAPY PER DAY

## 2025-05-09 PROCEDURE — 36415 COLL VENOUS BLD VENIPUNCTURE: CPT

## 2025-05-09 PROCEDURE — 6370000000 HC RX 637 (ALT 250 FOR IP): Performed by: STUDENT IN AN ORGANIZED HEALTH CARE EDUCATION/TRAINING PROGRAM

## 2025-05-09 PROCEDURE — 85027 COMPLETE CBC AUTOMATED: CPT

## 2025-05-09 PROCEDURE — 83735 ASSAY OF MAGNESIUM: CPT

## 2025-05-09 PROCEDURE — 6370000000 HC RX 637 (ALT 250 FOR IP): Performed by: INTERNAL MEDICINE

## 2025-05-09 PROCEDURE — 80048 BASIC METABOLIC PNL TOTAL CA: CPT

## 2025-05-09 PROCEDURE — 82800 BLOOD PH: CPT

## 2025-05-09 RX ORDER — BUMETANIDE 1 MG/1
2 TABLET ORAL 2 TIMES DAILY
Status: DISCONTINUED | OUTPATIENT
Start: 2025-05-09 | End: 2025-05-09 | Stop reason: HOSPADM

## 2025-05-09 RX ORDER — BUMETANIDE 0.25 MG/ML
1 INJECTION, SOLUTION INTRAMUSCULAR; INTRAVENOUS ONCE
Status: COMPLETED | OUTPATIENT
Start: 2025-05-09 | End: 2025-05-09

## 2025-05-09 RX ORDER — TAMSULOSIN HYDROCHLORIDE 0.4 MG/1
0.4 CAPSULE ORAL DAILY
Qty: 90 CAPSULE | Refills: 1 | Status: ON HOLD | DISCHARGE
Start: 2025-05-10

## 2025-05-09 RX ORDER — ACETAZOLAMIDE 250 MG/1
250 TABLET ORAL DAILY
Qty: 90 TABLET | Refills: 1 | Status: ON HOLD | DISCHARGE
Start: 2025-05-09

## 2025-05-09 RX ORDER — BUMETANIDE 2 MG/1
2 TABLET ORAL 2 TIMES DAILY
Status: ON HOLD | DISCHARGE
Start: 2025-05-09

## 2025-05-09 RX ORDER — CLOBETASOL PROPIONATE 0.5 MG/G
CREAM TOPICAL
Qty: 60 G | Refills: 1 | Status: ON HOLD | DISCHARGE
Start: 2025-05-09

## 2025-05-09 RX ORDER — ACETAZOLAMIDE 250 MG/1
250 TABLET ORAL DAILY
Status: DISCONTINUED | OUTPATIENT
Start: 2025-05-09 | End: 2025-05-09 | Stop reason: HOSPADM

## 2025-05-09 RX ADMIN — EMPAGLIFLOZIN 10 MG: 10 TABLET, FILM COATED ORAL at 09:41

## 2025-05-09 RX ADMIN — LEVOTHYROXINE SODIUM 150 MCG: 0.1 TABLET ORAL at 09:39

## 2025-05-09 RX ADMIN — SERTRALINE HYDROCHLORIDE 50 MG: 50 TABLET ORAL at 09:40

## 2025-05-09 RX ADMIN — SENNOSIDES 8.6 MG: 8.6 TABLET, FILM COATED ORAL at 09:39

## 2025-05-09 RX ADMIN — Medication 2000 UNITS: at 09:40

## 2025-05-09 RX ADMIN — METOPROLOL SUCCINATE 25 MG: 25 TABLET, EXTENDED RELEASE ORAL at 09:39

## 2025-05-09 RX ADMIN — ALLOPURINOL 200 MG: 100 TABLET ORAL at 09:39

## 2025-05-09 RX ADMIN — PETROLATUM: 420 OINTMENT TOPICAL at 11:21

## 2025-05-09 RX ADMIN — TAMSULOSIN HYDROCHLORIDE 0.4 MG: 0.4 CAPSULE ORAL at 09:39

## 2025-05-09 RX ADMIN — POLYETHYLENE GLYCOL 3350 17 G: 17 POWDER, FOR SOLUTION ORAL at 09:41

## 2025-05-09 RX ADMIN — SODIUM CHLORIDE 125 MG: 9 INJECTION, SOLUTION INTRAVENOUS at 09:54

## 2025-05-09 RX ADMIN — APIXABAN 5 MG: 5 TABLET, FILM COATED ORAL at 09:40

## 2025-05-09 RX ADMIN — CLOBETASOL PROPIONATE: 0.5 CREAM TOPICAL at 11:19

## 2025-05-09 RX ADMIN — BUMETANIDE 1 MG: 0.25 INJECTION INTRAMUSCULAR; INTRAVENOUS at 09:39

## 2025-05-09 RX ADMIN — ACETAZOLAMIDE 250 MG: 250 TABLET ORAL at 13:07

## 2025-05-09 RX ADMIN — INSULIN LISPRO 1 UNITS: 100 INJECTION, SOLUTION INTRAVENOUS; SUBCUTANEOUS at 13:07

## 2025-05-09 RX ADMIN — MICONAZOLE NITRATE: 20.6 POWDER TOPICAL at 11:20

## 2025-05-09 RX ADMIN — SODIUM CHLORIDE, PRESERVATIVE FREE 10 ML: 5 INJECTION INTRAVENOUS at 09:40

## 2025-05-09 RX ADMIN — ASPIRIN 81 MG: 81 TABLET, COATED ORAL at 09:41

## 2025-05-09 RX ADMIN — ATORVASTATIN CALCIUM 40 MG: 40 TABLET, FILM COATED ORAL at 09:41

## 2025-05-09 ASSESSMENT — PAIN SCALES - GENERAL: PAINLEVEL_OUTOF10: 0

## 2025-05-09 NOTE — PROGRESS NOTES
[facility]  Internal Medicine Residency Program  Progress Note - House Team 1    Patient:  Wander Rocha 69 y.o. male MRN: [id number]     Date of Service: 5/9/2025     CC: Transferred to  house team to service on/30/25 after upgrading his pacemaker to a CRT device   Overnight events:  No overnight events     Subjective     Patient was seen at bedside this morning. He is alert and oriented x 3. Redness on bilateral lower extremities are improving.  Bilateral lower extremity swelling has improved. Currently on baseline 2 L of O2, denies worsening shortness of breath. Bowel and bladder habits are normal. Appetite is normal.     Objective     Physical Exam:  Vitals: BP (!) 107/54   Pulse 59   Temp 97.1 °F (36.2 °C) (Temporal)   Resp 18   Ht 1.702 m (5' 7\")   Wt 95.8 kg (211 lb 1.6 oz)   SpO2 99%   BMI 33.06 kg/m²     I & O - 24hr: I/O this shift:  In: 300 [P.O.:300]  Out: 1555 [Urine:1555]   General Appearance: alert, appears stated age, and cooperative  HEENT:  Head: Normocephalic, no lesions, without obvious abnormality.  Neck: no adenopathy, no carotid bruit, no JVD, supple, symmetrical, trachea midline, and thyroid not enlarged, symmetric, no tenderness/mass/nodules  Lung: rales bibasilar and bilaterally, on 2 L of O2 by nasal cannula   Heart: regular rate and rhythm, S1, S2 normal, no murmur, click, rub or gallop  Abdomen: soft, non-tender; bowel sounds normal; no masses,  no organomegaly  Extremities:  edema 2+ bilateral lower extremities, sacral edema   Musculokeletal: No joint swelling, no muscle tenderness. ROM normal in all joints of extremities.   Neurologic: Mental status: Alert, oriented, thought content appropriate    Subjective  Pertinent Labs & Imaging Studies     CBC with Differential:    Lab Results   Component Value Date/Time    WBC 7.4 05/09/2025 05:48 AM    RBC 2.97 05/09/2025 05:48 AM    HGB 9.0 05/09/2025 05:48 AM    HCT 28.5 05/09/2025 05:48 AM     05/09/2025

## 2025-05-09 NOTE — PROGRESS NOTES
INPATIENT CARDIOLOGY FOLLOW-UP    Name: Wander Rocha    Age: 69 y.o.    Date of Admission: 4/23/2025  8:25 AM    Date of Service: 5/9/2025    Primary Cardiologist: Dr. Fontanez    Chief Complaint: Follow-up for ADHF    Interim History:  No new overnight cardiac complaints. Currently with no complaints of CP, SOB, palpitations, dizziness, or lightheadedness.  Dual AV paced rhythm on telemetry    Remains on 2 L of oxygen nasal cannula.  No home oxygen.  6.5 L negative so far.  Good response to IV diuretics yesterday.   Review of Systems:   Negative except as described above    Problem List:  Patient Active Problem List   Diagnosis    Class 2 obesity due to excess calories with serious comorbidity and body mass index (BMI) of 38.0 to 38.9 in adult    Hyperlipidemia    Essential hypertension    Hypothyroidism    NIKO (obstructive sleep apnea)    Restrictive lung disease secondary to obesity    Tachycardia-bradycardia (HCC)    Pacemaker    Persistent atrial fibrillation (HCC)    Chronic obstructive pulmonary disease, unspecified COPD type (HCC)    Acute decompensated heart failure (HCC)    Normocytic anemia    Stage 3b chronic kidney disease ([facility])    Proteinuria due to type 2 diabetes mellitus (HCC)    Gout    Type 2 diabetes mellitus with diabetic neuropathy, with long-term current use of insulin (HCC)    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

## 2025-05-09 NOTE — PLAN OF CARE
Problem: Chronic Conditions and Co-morbidities  Goal: Patient's chronic conditions and co-morbidity symptoms are monitored and maintained or improved  5/8/2025 2309 by Neyda Sanchez RN  Outcome: Progressing  5/8/2025 1358 by Maryann Hidalgo RN  Outcome: Progressing     Problem: Discharge Planning  Goal: Discharge to home or other facility with appropriate resources  5/8/2025 2309 by Neyda Sanchez RN  Outcome: Progressing  5/8/2025 1358 by Maryann Hidalgo RN  Outcome: Progressing     Problem: ABCDS Injury Assessment  Goal: Absence of physical injury  5/8/2025 2309 by Neyda Sanchez RN  Outcome: Progressing  5/8/2025 1358 by Maryann Hidalgo RN  Outcome: Progressing     Problem: Pain  Goal: Verbalizes/displays adequate comfort level or baseline comfort level  5/8/2025 2309 by Neyda Sanchez RN  Outcome: Progressing  5/8/2025 1358 by Maryann Hidalgo RN  Outcome: Progressing     Problem: Safety - Adult  Goal: Free from fall injury  5/8/2025 2309 by Neyda Sanchez RN  Outcome: Progressing  5/8/2025 1358 by Maryann Hidalgo RN  Outcome: Progressing     Problem: Respiratory - Adult  Goal: Achieves optimal ventilation and oxygenation  5/8/2025 2309 by Neyda Sanchez RN  Outcome: Progressing  5/8/2025 1358 by Maryann Hidalgo RN  Outcome: Progressing     Problem: Cardiovascular - Adult  Goal: Maintains optimal cardiac output and hemodynamic stability  5/8/2025 2309 by Neyda Sanchez RN  Outcome: Progressing  5/8/2025 1358 by Maryann Hidalgo RN  Outcome: Progressing     Problem: Metabolic/Fluid and Electrolytes - Adult  Goal: Electrolytes maintained within normal limits  5/8/2025 2309 by Neyda Sanchez RN  Outcome: Progressing  5/8/2025 1358 by Maryann Hidalgo RN  Outcome: Progressing  Goal: Hemodynamic stability and optimal renal function maintained  5/8/2025 2309 by Neyda Sanchez RN  Outcome: Progressing  5/8/2025 1358 by Maryann Hidalgo RN  Outcome: Progressing  Goal: Glucose maintained within

## 2025-05-09 NOTE — PROGRESS NOTES
Department of Internal Medicine  Nephrology Progress Note    Events reviewed.    SUBJECTIVE: We are following Wander Rocha for RADHA. Patient reports no complaints.     PHYSICAL EXAM:      Vitals:    VITALS:  BP (!) 115/59   Pulse 66   Temp 97 °F (36.1 °C) (Temporal)   Resp 18   Ht 1.702 m (5' 7\")   Wt 95.8 kg (211 lb 1.6 oz)   SpO2 98%   BMI 33.06 kg/m²   24HR INTAKE/OUTPUT:    Intake/Output Summary (Last 24 hours) at 5/9/2025 0903  Last data filed at 5/9/2025 0622  Gross per 24 hour   Intake 442.74 ml   Output 2675 ml   Net -2232.26 ml     Scheduled Meds:   miconazole   Topical BID    empagliflozin  10 mg Oral Daily    senna  1 tablet Oral BID    ferric gluconate (FERRLECIT) 125 mg in sodium chloride 0.9 % 100 mL IVPB  125 mg IntraVENous Daily    tamsulosin  0.4 mg Oral Daily    clobetasol   Topical BID    white petrolatum   Topical BID    apixaban  5 mg Oral BID    insulin glargine  14 Units SubCUTAneous Nightly    insulin lispro  0-4 Units SubCUTAneous 4x Daily AC & HS    polyethylene glycol  17 g Oral Daily    vitamin D  2,000 Units Oral Daily    aspirin  81 mg Oral Daily    ferrous sulfate  325 mg Oral Every Other Day    atorvastatin  40 mg Oral Daily    metoprolol succinate  25 mg Oral Daily    sertraline  50 mg Oral Daily    allopurinol  200 mg Oral Daily    [Held by provider] lisinopril  5 mg Oral Daily    sodium chloride flush  5-40 mL IntraVENous 2 times per day    levothyroxine  150 mcg Oral Daily     Continuous Infusions:   dextrose      sodium chloride       PRN Meds:.HYDROcodone 5 mg - acetaminophen, glucose, dextrose bolus **OR** dextrose bolus, glucagon (rDNA), dextrose, sodium chloride flush, sodium chloride, ondansetron, acetaminophen     Constitutional:  Awake, alert, oriented, in NAD  HEENT:  PERRLA, normocephalic, atraumatic  Respiratory: Diminished  Cardiovascular/Edema:  RRR, normal S1, normal S2  Gastrointestinal:  Soft, flat, non-distended, non-tender  Neurologic:  Nonfocal  Skin:

## 2025-05-09 NOTE — DISCHARGE SUMMARY
discontinued. He was given albumin 25 g IV for 3 doses on 5/3 and again on 5/7. Vitamin B 12 and folate levels were normal. He was started on IV iron. Repeat UA showed no evidence of proteinuria to suggest nephropathy. He was started on scheduled bowel regimen with senna and glycolax due to constipation, bowel movements improved overtime. Cardiology consulted for VHD in setting of HF and recommended to start Jardiance 10 mg daily. Doxycycline 100 mg BID was continued for post procedure prophylaxis. Nystatin ointment and clobetasol cream were ordered for worsening bilateral lower extremity stasis dermatitis which improved overtime with topical medications and daily wrapping. Bumex 2 mg BID PO was resumed with Acetazolamide 250 mg daily at discharge per nephrology recommendations. Pre-cert was accepted to [facility] for acute rehab and patient was discharged stable.     On day of discharge :   Subjective:  Patient was seen at bedside this morning. He is alert and oriented x 3. Redness on bilateral lower extremities are improving. Bilateral lower extremity swelling has improved. Currently on baseline 2 L of O2, denies worsening shortness of breath. Bowel and bladder habits are normal. Appetite is normal.     Review of Systems   Constitutional:  Positive for activity change and fatigue. Negative for appetite change, chills, diaphoresis, fever and unexpected weight change.   HENT:  Negative for congestion, dental problem, drooling, ear discharge, ear pain, facial swelling, hearing loss, mouth sores, nosebleeds, postnasal drip and rhinorrhea.    Eyes:  Negative for pain, discharge, redness and itching.   Respiratory:  Positive for wheezing. Negative for apnea, cough, choking, chest tightness and shortness of breath.    Cardiovascular:  Positive for leg swelling. Negative for chest pain and palpitations.   Gastrointestinal:  Negative for abdominal distention, abdominal pain, anal bleeding, blood in stool,  constipation, diarrhea and nausea.   Genitourinary:  Negative for difficulty urinating, dysuria, enuresis, flank pain, frequency, genital sores, hematuria, penile discharge, penile pain, penile swelling and scrotal swelling.   Musculoskeletal:  Positive for gait problem and myalgias. Negative for arthralgias, back pain, joint swelling and neck pain.   Skin:  Negative for color change, pallor, rash and wound.   Neurological:  Negative for dizziness, seizures, facial asymmetry, light-headedness, numbness and headaches.   Hematological:  Negative for adenopathy. Does not bruise/bleed easily.   Psychiatric/Behavioral:  Negative for agitation and behavioral problems.             Follow up in Clinic:   With in 10 days of discharge for hospital follow up     Significant findings (history and exam, laboratory, radiological, pathology, other tests):   General Appearance: alert, appears stated age, and cooperative  HEENT:  Head: Normocephalic, no lesions, without obvious abnormality.  Neck: no adenopathy, no carotid bruit, no JVD, supple, symmetrical, trachea midline, and thyroid not enlarged, symmetric, no tenderness/mass/nodules  Lung: wheezes bilaterally  Heart: regular rate and rhythm, S1, S2 normal, no murmur, click, rub or gallop  Abdomen: soft, non-tender; bowel sounds normal; no masses,  no organomegaly  Extremities:  edema 2+ bilateral   Musculokeletal: No joint swelling, no muscle tenderness. ROM normal in all joints of extremities.   Neurologic: Mental status: Alert, oriented, thought content appropriate      Pending test results: None    Consults:  Nephrology  Electrophysiology   Cardiology     Procedures:  CRT-P placement on     Condition at discharge: Stable    Disposition: SNF    Time taken for discharge : < 30 mins [] >30 mins [x]    Discharge Medications:  Current Discharge Medication List        START taking these medications    Details   empagliflozin (JARDIANCE) 10 MG tablet Take 1 tablet by mouth

## 2025-05-09 NOTE — CARE COORDINATION
Reviewed chart, renal ok for discharge. Spoke with Giulia at [place], precert obtained. Messaged resident for discharge. Envelope, ambullete form, and completed PASRR in soft chart. .Lina Horvath, MSW, LSW

## 2025-05-13 NOTE — PROGRESS NOTES
Wife called and cancelled CHF Clinic appt for 5/14/25 pt on rehab.  Wife confirmed will call back to re schedule.

## 2025-05-14 ENCOUNTER — HOSPITAL ENCOUNTER (INPATIENT)
Age: 70
LOS: 15 days | Discharge: NURSING, REHAB OR LONG-TERM CARE | DRG: 871 | End: 2025-05-29
Attending: EMERGENCY MEDICINE | Admitting: INTERNAL MEDICINE
Payer: MEDICARE

## 2025-05-14 ENCOUNTER — HOSPITAL ENCOUNTER (OUTPATIENT)
Dept: OTHER | Age: 70
Setting detail: THERAPIES SERIES
Discharge: HOME | End: 2025-05-14
Payer: MEDICARE

## 2025-05-14 ENCOUNTER — APPOINTMENT (OUTPATIENT)
Dept: GENERAL RADIOLOGY | Age: 70
DRG: 871 | End: 2025-05-14
Payer: MEDICARE

## 2025-05-14 ENCOUNTER — RESULTS FOLLOW-UP (OUTPATIENT)
Age: 70
End: 2025-05-14

## 2025-05-14 VITALS
OXYGEN SATURATION: 93 % | RESPIRATION RATE: 18 BRPM | WEIGHT: 213 LBS | DIASTOLIC BLOOD PRESSURE: 54 MMHG | SYSTOLIC BLOOD PRESSURE: 90 MMHG | BODY MASS INDEX: 33.36 KG/M2 | HEART RATE: 84 BPM

## 2025-05-14 DIAGNOSIS — J90 PLEURAL EFFUSION: ICD-10-CM

## 2025-05-14 DIAGNOSIS — N17.9 AKI (ACUTE KIDNEY INJURY): Primary | ICD-10-CM

## 2025-05-14 DIAGNOSIS — M79.89 SWELLING OF ARM: ICD-10-CM

## 2025-05-14 LAB
ALBUMIN SERPL-MCNC: 3.7 G/DL (ref 3.5–5.2)
ALP SERPL-CCNC: 92 U/L (ref 40–129)
ALT SERPL-CCNC: 39 U/L (ref 0–50)
ANION GAP SERPL CALCULATED.3IONS-SCNC: 16 MMOL/L (ref 7–16)
ANION GAP SERPL CALCULATED.3IONS-SCNC: 18 MMOL/L (ref 7–16)
AST SERPL-CCNC: 33 U/L (ref 0–50)
BASOPHILS # BLD: 0.06 K/UL (ref 0–0.2)
BASOPHILS NFR BLD: 1 % (ref 0–2)
BILIRUB SERPL-MCNC: 0.5 MG/DL (ref 0–1.2)
BNP SERPL-MCNC: 3635 PG/ML (ref 0–125)
BNP SERPL-MCNC: 3981 PG/ML (ref 0–125)
BUN SERPL-MCNC: 125 MG/DL (ref 8–23)
BUN SERPL-MCNC: 131 MG/DL (ref 8–23)
CALCIUM SERPL-MCNC: 8.9 MG/DL (ref 8.8–10.2)
CALCIUM SERPL-MCNC: 9 MG/DL (ref 8.8–10.2)
CHLORIDE SERPL-SCNC: 89 MMOL/L (ref 98–107)
CHLORIDE SERPL-SCNC: 90 MMOL/L (ref 98–107)
CO2 SERPL-SCNC: 25 MMOL/L (ref 22–29)
CO2 SERPL-SCNC: 25 MMOL/L (ref 22–29)
CREAT SERPL-MCNC: 4.7 MG/DL (ref 0.7–1.2)
CREAT SERPL-MCNC: 4.9 MG/DL (ref 0.7–1.2)
CREAT UR-MCNC: 99.7 MG/DL (ref 40–278)
EKG ATRIAL RATE: 33 BPM
EKG P AXIS: 41 DEGREES
EKG P-R INTERVAL: 148 MS
EKG Q-T INTERVAL: 110 MS
EKG QRS DURATION: 114 MS
EKG QTC CALCULATION (BAZETT): 171 MS
EKG R AXIS: 35 DEGREES
EKG T AXIS: 0 DEGREES
EKG VENTRICULAR RATE: 146 BPM
EOSINOPHIL # BLD: 0.41 K/UL (ref 0.05–0.5)
EOSINOPHILS RELATIVE PERCENT: 4 % (ref 0–6)
ERYTHROCYTE [DISTWIDTH] IN BLOOD BY AUTOMATED COUNT: 16.3 % (ref 11.5–15)
GFR, ESTIMATED: 12 ML/MIN/1.73M2
GFR, ESTIMATED: 13 ML/MIN/1.73M2
GLUCOSE SERPL-MCNC: 106 MG/DL (ref 74–99)
GLUCOSE SERPL-MCNC: 112 MG/DL (ref 74–99)
HCT VFR BLD AUTO: 25.7 % (ref 37–54)
HGB BLD-MCNC: 8 G/DL (ref 12.5–16.5)
IMM GRANULOCYTES # BLD AUTO: 0.2 K/UL (ref 0–0.58)
IMM GRANULOCYTES NFR BLD: 2 % (ref 0–5)
LYMPHOCYTES NFR BLD: 0.76 K/UL (ref 1.5–4)
LYMPHOCYTES RELATIVE PERCENT: 8 % (ref 20–42)
MCH RBC QN AUTO: 30.1 PG (ref 26–35)
MCHC RBC AUTO-ENTMCNC: 31.1 G/DL (ref 32–34.5)
MCV RBC AUTO: 96.6 FL (ref 80–99.9)
MONOCYTES NFR BLD: 0.82 K/UL (ref 0.1–0.95)
MONOCYTES NFR BLD: 8 % (ref 2–12)
NEUTROPHILS NFR BLD: 77 % (ref 43–80)
NEUTS SEG NFR BLD: 7.49 K/UL (ref 1.8–7.3)
OSMOLALITY UR: 301 MOSM/KG (ref 300–900)
PLATELET # BLD AUTO: 173 K/UL (ref 130–450)
PMV BLD AUTO: 9.9 FL (ref 7–12)
POTASSIUM SERPL-SCNC: 5.1 MMOL/L (ref 3.5–5.1)
POTASSIUM SERPL-SCNC: 5.5 MMOL/L (ref 3.5–5.1)
PROT SERPL-MCNC: 6.9 G/DL (ref 6.4–8.3)
RBC # BLD AUTO: 2.66 M/UL (ref 3.8–5.8)
SODIUM SERPL-SCNC: 130 MMOL/L (ref 136–145)
SODIUM SERPL-SCNC: 132 MMOL/L (ref 136–145)
SODIUM UR-SCNC: 40 MMOL/L
TOTAL PROTEIN, URINE: 10 MG/DL (ref 0–12)
TROPONIN I SERPL HS-MCNC: 61 NG/L (ref 0–22)
TROPONIN I SERPL HS-MCNC: 62 NG/L (ref 0–22)
URINE TOTAL PROTEIN CREATININE RATIO: 0.1 (ref 0–0.2)
UUN UR-MCNC: 364 MG/DL (ref 800–1666)
WBC OTHER # BLD: 9.7 K/UL (ref 4.5–11.5)

## 2025-05-14 PROCEDURE — 93010 ELECTROCARDIOGRAM REPORT: CPT | Performed by: INTERNAL MEDICINE

## 2025-05-14 PROCEDURE — 80053 COMPREHEN METABOLIC PANEL: CPT

## 2025-05-14 PROCEDURE — 99285 EMERGENCY DEPT VISIT HI MDM: CPT

## 2025-05-14 PROCEDURE — 85025 COMPLETE CBC W/AUTO DIFF WBC: CPT

## 2025-05-14 PROCEDURE — 6370000000 HC RX 637 (ALT 250 FOR IP): Performed by: EMERGENCY MEDICINE

## 2025-05-14 PROCEDURE — 84156 ASSAY OF PROTEIN URINE: CPT

## 2025-05-14 PROCEDURE — 80048 BASIC METABOLIC PNL TOTAL CA: CPT

## 2025-05-14 PROCEDURE — 6370000000 HC RX 637 (ALT 250 FOR IP)

## 2025-05-14 PROCEDURE — P9047 ALBUMIN (HUMAN), 25%, 50ML: HCPCS

## 2025-05-14 PROCEDURE — 2580000003 HC RX 258: Performed by: EMERGENCY MEDICINE

## 2025-05-14 PROCEDURE — 99214 OFFICE O/P EST MOD 30 MIN: CPT

## 2025-05-14 PROCEDURE — 84540 ASSAY OF URINE/UREA-N: CPT

## 2025-05-14 PROCEDURE — 83880 ASSAY OF NATRIURETIC PEPTIDE: CPT

## 2025-05-14 PROCEDURE — 2060000000 HC ICU INTERMEDIATE R&B

## 2025-05-14 PROCEDURE — 71045 X-RAY EXAM CHEST 1 VIEW: CPT

## 2025-05-14 PROCEDURE — 82043 UR ALBUMIN QUANTITATIVE: CPT

## 2025-05-14 PROCEDURE — 84300 ASSAY OF URINE SODIUM: CPT

## 2025-05-14 PROCEDURE — 93005 ELECTROCARDIOGRAM TRACING: CPT | Performed by: EMERGENCY MEDICINE

## 2025-05-14 PROCEDURE — 6360000002 HC RX W HCPCS

## 2025-05-14 PROCEDURE — 82570 ASSAY OF URINE CREATININE: CPT

## 2025-05-14 PROCEDURE — 84484 ASSAY OF TROPONIN QUANT: CPT

## 2025-05-14 PROCEDURE — 83935 ASSAY OF URINE OSMOLALITY: CPT

## 2025-05-14 RX ORDER — ALBUMIN (HUMAN) 12.5 G/50ML
25 SOLUTION INTRAVENOUS EVERY 8 HOURS
Status: COMPLETED | OUTPATIENT
Start: 2025-05-14 | End: 2025-05-15

## 2025-05-14 RX ORDER — TAMSULOSIN HYDROCHLORIDE 0.4 MG/1
0.4 CAPSULE ORAL DAILY
Status: DISCONTINUED | OUTPATIENT
Start: 2025-05-14 | End: 2025-05-29 | Stop reason: HOSPADM

## 2025-05-14 RX ORDER — SODIUM CHLORIDE 9 MG/ML
INJECTION, SOLUTION INTRAVENOUS PRN
Status: DISCONTINUED | OUTPATIENT
Start: 2025-05-14 | End: 2025-05-29 | Stop reason: HOSPADM

## 2025-05-14 RX ORDER — ACETAMINOPHEN 650 MG/1
650 SUPPOSITORY RECTAL EVERY 6 HOURS PRN
Status: DISCONTINUED | OUTPATIENT
Start: 2025-05-14 | End: 2025-05-29 | Stop reason: HOSPADM

## 2025-05-14 RX ORDER — BUMETANIDE 1 MG/1
2 TABLET ORAL 2 TIMES DAILY
Status: DISCONTINUED | OUTPATIENT
Start: 2025-05-14 | End: 2025-05-29 | Stop reason: HOSPADM

## 2025-05-14 RX ORDER — SODIUM CHLORIDE 0.9 % (FLUSH) 0.9 %
5-40 SYRINGE (ML) INJECTION ONCE
Status: DISCONTINUED | OUTPATIENT
Start: 2025-05-14 | End: 2025-05-14

## 2025-05-14 RX ORDER — LEVOTHYROXINE SODIUM 50 UG/1
50 TABLET ORAL DAILY
Status: DISCONTINUED | OUTPATIENT
Start: 2025-05-14 | End: 2025-05-26

## 2025-05-14 RX ORDER — ATORVASTATIN CALCIUM 40 MG/1
40 TABLET, FILM COATED ORAL DAILY
Status: DISCONTINUED | OUTPATIENT
Start: 2025-05-14 | End: 2025-05-29 | Stop reason: HOSPADM

## 2025-05-14 RX ORDER — BUMETANIDE 0.25 MG/ML
2 INJECTION, SOLUTION INTRAMUSCULAR; INTRAVENOUS ONCE
Status: DISCONTINUED | OUTPATIENT
Start: 2025-05-14 | End: 2025-05-14

## 2025-05-14 RX ORDER — LISINOPRIL 5 MG/1
5 TABLET ORAL DAILY
Status: DISCONTINUED | OUTPATIENT
Start: 2025-05-14 | End: 2025-05-22

## 2025-05-14 RX ORDER — CHOLECALCIFEROL (VITAMIN D3) 50 MCG
2000 TABLET ORAL DAILY
Status: DISCONTINUED | OUTPATIENT
Start: 2025-05-14 | End: 2025-05-29 | Stop reason: HOSPADM

## 2025-05-14 RX ORDER — MIDODRINE HYDROCHLORIDE 5 MG/1
5 TABLET ORAL
Status: DISCONTINUED | OUTPATIENT
Start: 2025-05-14 | End: 2025-05-15

## 2025-05-14 RX ORDER — ONDANSETRON 2 MG/ML
4 INJECTION INTRAMUSCULAR; INTRAVENOUS EVERY 6 HOURS PRN
Status: DISCONTINUED | OUTPATIENT
Start: 2025-05-14 | End: 2025-05-29 | Stop reason: HOSPADM

## 2025-05-14 RX ORDER — METOPROLOL SUCCINATE 25 MG/1
25 TABLET, EXTENDED RELEASE ORAL DAILY
Status: DISCONTINUED | OUTPATIENT
Start: 2025-05-14 | End: 2025-05-15

## 2025-05-14 RX ORDER — POLYETHYLENE GLYCOL 3350 17 G/17G
17 POWDER, FOR SOLUTION ORAL DAILY PRN
Status: DISCONTINUED | OUTPATIENT
Start: 2025-05-14 | End: 2025-05-29 | Stop reason: HOSPADM

## 2025-05-14 RX ORDER — ONDANSETRON 4 MG/1
4 TABLET, ORALLY DISINTEGRATING ORAL EVERY 8 HOURS PRN
Status: DISCONTINUED | OUTPATIENT
Start: 2025-05-14 | End: 2025-05-29 | Stop reason: HOSPADM

## 2025-05-14 RX ORDER — SODIUM CHLORIDE 0.9 % (FLUSH) 0.9 %
5-40 SYRINGE (ML) INJECTION PRN
Status: DISCONTINUED | OUTPATIENT
Start: 2025-05-14 | End: 2025-05-29 | Stop reason: HOSPADM

## 2025-05-14 RX ORDER — BUMETANIDE 0.25 MG/ML
2 INJECTION, SOLUTION INTRAMUSCULAR; INTRAVENOUS 2 TIMES DAILY
Status: DISCONTINUED | OUTPATIENT
Start: 2025-05-15 | End: 2025-05-21

## 2025-05-14 RX ORDER — SODIUM CHLORIDE 0.9 % (FLUSH) 0.9 %
5-40 SYRINGE (ML) INJECTION EVERY 12 HOURS SCHEDULED
Status: DISCONTINUED | OUTPATIENT
Start: 2025-05-14 | End: 2025-05-16

## 2025-05-14 RX ORDER — ASPIRIN 81 MG/1
81 TABLET ORAL DAILY
Status: DISCONTINUED | OUTPATIENT
Start: 2025-05-14 | End: 2025-05-29 | Stop reason: HOSPADM

## 2025-05-14 RX ORDER — 0.9 % SODIUM CHLORIDE 0.9 %
500 INTRAVENOUS SOLUTION INTRAVENOUS ONCE
Status: COMPLETED | OUTPATIENT
Start: 2025-05-14 | End: 2025-05-14

## 2025-05-14 RX ORDER — ACETAZOLAMIDE 250 MG/1
250 TABLET ORAL DAILY
Status: DISCONTINUED | OUTPATIENT
Start: 2025-05-14 | End: 2025-05-22

## 2025-05-14 RX ORDER — ALLOPURINOL 100 MG/1
200 TABLET ORAL DAILY
Status: DISCONTINUED | OUTPATIENT
Start: 2025-05-14 | End: 2025-05-29 | Stop reason: HOSPADM

## 2025-05-14 RX ORDER — LEVOTHYROXINE SODIUM 100 UG/1
100 TABLET ORAL DAILY
Status: DISCONTINUED | OUTPATIENT
Start: 2025-05-15 | End: 2025-05-26

## 2025-05-14 RX ORDER — ACETAMINOPHEN 325 MG/1
650 TABLET ORAL EVERY 6 HOURS PRN
Status: DISCONTINUED | OUTPATIENT
Start: 2025-05-14 | End: 2025-05-29 | Stop reason: HOSPADM

## 2025-05-14 RX ADMIN — ACETAMINOPHEN 650 MG: 325 TABLET ORAL at 21:11

## 2025-05-14 RX ADMIN — SODIUM CHLORIDE 500 ML: 0.9 INJECTION, SOLUTION INTRAVENOUS at 17:55

## 2025-05-14 RX ADMIN — ALBUMIN (HUMAN) 25 G: 0.25 INJECTION, SOLUTION INTRAVENOUS at 21:06

## 2025-05-14 RX ADMIN — MIDODRINE HYDROCHLORIDE 5 MG: 5 TABLET ORAL at 19:37

## 2025-05-14 RX ADMIN — SODIUM ZIRCONIUM CYCLOSILICATE 10 G: 10 POWDER, FOR SUSPENSION ORAL at 17:53

## 2025-05-14 ASSESSMENT — PAIN SCALES - GENERAL
PAINLEVEL_OUTOF10: 0
PAINLEVEL_OUTOF10: 10

## 2025-05-14 ASSESSMENT — PAIN DESCRIPTION - ORIENTATION: ORIENTATION: MID

## 2025-05-14 ASSESSMENT — PATIENT HEALTH QUESTIONNAIRE - PHQ9
2. FEELING DOWN, DEPRESSED OR HOPELESS: NOT AT ALL
1. LITTLE INTEREST OR PLEASURE IN DOING THINGS: SEVERAL DAYS
SUM OF ALL RESPONSES TO PHQ QUESTIONS 1-9: 1

## 2025-05-14 ASSESSMENT — PAIN - FUNCTIONAL ASSESSMENT: PAIN_FUNCTIONAL_ASSESSMENT: ACTIVITIES ARE NOT PREVENTED

## 2025-05-14 ASSESSMENT — PAIN DESCRIPTION - DESCRIPTORS: DESCRIPTORS: ACHING

## 2025-05-14 ASSESSMENT — PAIN SCALES - WONG BAKER: WONGBAKER_NUMERICALRESPONSE: NO HURT

## 2025-05-14 ASSESSMENT — PAIN DESCRIPTION - LOCATION: LOCATION: BACK

## 2025-05-14 NOTE — ED PROVIDER NOTES
[facility]  EMERGENCY DEPARTMENT ENCOUNTER        Pt Name: Wander Rocha  MRN: [id number]  Birthdate 1955  Date of evaluation: 5/14/2025  Provider: Maria Stein DO  PCP: Oly Crespo MD  Note Started: 3:05 PM EDT 5/14/25    CHIEF COMPLAINT       Chief Complaint   Patient presents with    Abnormal Lab     Sent in from [facility] for elevated CR       HISTORY OF PRESENT ILLNESS: 1 or more Elements       Wander Rocha is a 69 y.o. male who presents to the emergency department the chief complaint of elevated creatinine.  The patient is presenting from [facility] for elevated creatinine.  The patient states that he feels overall well.  On chart review the patient does have a history of CHF.  The patient is on fluid restriction at 1000 mL/day. There are no associated fevers, chills, lightheadedness, nasal congestion, chest pain, shortness of breath, cough, nausea, vomiting, abdominal pain, diarrhea, constipation, dysuria or hematuria. The patient has no other stated complaints at this time.      Nursing Notes were all reviewed and agreed with or any disagreements were addressed in the HPI.    REVIEW OF SYSTEMS :      Review of Systems   Constitutional:  Negative for fever.   HENT:  Negative for congestion.    Eyes:  Negative for redness.   Respiratory:  Negative for shortness of breath.    Cardiovascular:  Negative for chest pain.   Gastrointestinal:  Negative for abdominal pain, nausea and vomiting.   Genitourinary:  Negative for dysuria.   Musculoskeletal:  Negative for arthralgias.   Skin:  Negative for rash.   Neurological:  Negative for dizziness.   Psychiatric/Behavioral:  Negative for confusion.    All other systems reviewed and are negative.      Positives and Pertinent negatives as per HPI.     SURGICAL HISTORY     Past Surgical History:   Procedure Laterality Date    BACK SURGERY  2012    [facility]. Pinched nerve in back    BACK SURGERY  05/30/2017    BACK SURGERY N/A    midodrine (PROAMATINE) tablet 10 mg (0 mg Oral Held 5/16/25 0849)   metoprolol succinate (TOPROL XL) extended release tablet 12.5 mg ( Oral Automatically Held 5/19/25 0900)   epoetin lay-epbx (RETACRIT) injection 3,000 Units (3,000 Units SubCUTAneous Given 5/15/25 2223)   ferric gluconate (FERRLECIT) 125 mg in sodium chloride 0.9 % 100 mL IVPB ( IntraVENous Automatically Held 5/18/25 0900)   bumetanide (BUMEX) 12.5 mg in sodium chloride 0.9 % 125 mL infusion (0 mg/hr IntraVENous Stopped 5/16/25 0411)   sodium chloride flush 0.9 % injection 5-40 mL (0 mLs IntraVENous Held 5/16/25 0740)   sodium chloride flush 0.9 % injection 5-40 mL (has no administration in time range)   0.9 % sodium chloride infusion (has no administration in time range)   glucose chewable tablet 16 g (has no administration in time range)   dextrose bolus 10% 125 mL (has no administration in time range)     Or   dextrose bolus 10% 250 mL (has no administration in time range)   glucagon injection 1 mg (has no administration in time range)   dextrose 10 % infusion (has no administration in time range)   0.9 % sodium chloride infusion (has no administration in time range)   norepinephrine (LEVOPHED) 16 mg in sodium chloride 0.9 % 250 mL infusion (premix) (7 mcg/min IntraVENous Rate/Dose Verify 5/16/25 0650)   piperacillin-tazobactam (ZOSYN) 4,500 mg in sodium chloride 0.9 % 100 mL IVPB ([id number]) (0 mg IntraVENous Stopped 5/16/25 0730)     Followed by   piperacillin-tazobactam (ZOSYN) 4,500 mg in sodium chloride 0.9 % 100 mL IVPB ([id number]) (has no administration in time range)   vancomycin (VANCOCIN) 2500 mg in sodium chloride 0.9 % 500 mL IVPB (2,500 mg IntraVENous New Bag 5/16/25 0803)   sodium chloride 0.9 % bolus 1,000 mL (has no administration in time range)   sodium chloride 0.9 % bolus 500 mL (0 mLs IntraVENous Stopped 5/14/25 2053)   sodium zirconium cyclosilicate (LOKELMA) oral suspension 10 g (10 g Oral Given 5/14/25 0263)   albumin

## 2025-05-14 NOTE — H&P
[facility]  Internal Medicine Residency Program  History and Physical    Patient:  Wander Rocha 69 y.o. male   MRN: [id number]       Date of Service: 5/14/2025          Chief complaint: had concerns including Abnormal Lab (Sent in from [facility] for elevated CR).    History of Present Illness   The patient is a 69 y.o. male , with past medical history of heart failure with recovered ejection fraction with dual pacemaker, A-fib, chronic kidney disease, hypertension, hyperlipidemia, hypothyroidism, obesity hyper ventilation syndrome presented from his [facility] facility after concerns of RADHA.    Of note, patient was admitted in the hospital on April 2025 with similar concerns of shortness of breath and acute kidney injury.  He was placed on Bumex drip and eventually was discharged home with oral Bumex.     Today patient was brought with concerns of acute kidney injury.  He also complains of acute onset of shortness of breath with increasing oxygen demand, and increasing bilateral leg swelling.  She also endorses oliguria over the last 12 hours.  No concerns of chest pain, palpitations, nausea, vomiting, dizziness, headache.  On arrival, patient was hypotensive and was given 500 mL bolus of fluid.  He was placed on 3 L nasal cannula.  Labs pertinent were hyponatremia, elevated creatinine, elevated proBNP, elevated troponin, low hemoglobin.  Chest x-ray shows bilateral pulmonary edema with pulmonary congestion.     Internal medicine was consulted for medical management.    Past Medical History:      Diagnosis Date    RADHA (acute kidney injury) 03/10/2022    Atrial fibrillation (HCC)     Carpal tunnel syndrome     Colorectal polyps 04/15/2013    Diverticula of colon 04/15/2013    Encounter regarding vascular access for dialysis for ESRD (HCC) 03/12/2022    Hyperlipidemia     Hypertension     Hypothyroidism     Lung nodule     Lung nodules 04/15/2013    Nocturnal hypoxemia due to obesity

## 2025-05-14 NOTE — CARE COORDINATION
05/14/25 Case Management Note: chart reviewed in ER due to patient being a return to the ER within 30 days of discharge to a skilled facility. Patient is from [facility]. He will require a prior authorization to return per Giulia at [facility]. Electronically signed by Lilian Edge RN CM on 5/14/2025 at 3:15 PM

## 2025-05-14 NOTE — PROGRESS NOTES
Congestive Heart Failure Clinic   [facility]       Referring Provider: -    Primary Care Physician: Oly Crespo MD   Cardiologist: -DR. MERCER  Nephrologist: Dr. Sánchez      HISTORY OF PRESENT ILLNESS:     Wander Rocha is a 69 y.o. (1955) male with a history of HFpEF, most recent EF:  Lab Results   Component Value Date    LVEF 65 08/23/2022 7/12/24  no new echo   He presents to the CHF clinic for ongoing evaluation and monitoring of heart failure.    In the CHF clinic today he denies any adverse symptoms except:  [] Dizziness or lightheadedness   [] Syncope or near syncope  [] Recent Fall  [] Shortness of breath at rest   [x] Dyspnea with exertion (occasionally with climbing stairs)  [] Decline in functional capacity (unable to perform activities they had previously been able to do)  [] Fatigue   [] Orthopnea  [] PND  [] Nocturnal cough  [] Hemoptysis  [] Chest pain, pressure, or discomfort  [] Palpitations  [] Abdominal distention  [] Abdominal bloating  [] Early satiety  [] Blood in stool   [] Diarrhea  [] Constipation  [] Nausea/Vomiting  [] Decreased urinary response to oral diuretic   [] Scrotal swelling   [x] Lower extremity edema  [] Used PRN doses of oral diuretic   [x] Weight gain       Wt Readings from Last 3 Encounters:   05/14/25 96.6 kg (213 lb)   05/09/25 95.8 kg (211 lb 1.6 oz)   04/17/25 95.3 kg (210 lb)       SOCIAL HISTORY:  [x] Denies tobacco, alcohol or illicit drug abuse  [] Tobacco use:  [] ETOH use:  [] Illicit drug use:        MEDICATIONS:    No Known Allergies    Current Outpatient Medications:     apixaban (ELIQUIS) 5 MG TABS tablet, Take 1 tablet by mouth 2 times daily, Disp: , Rfl:     empagliflozin (JARDIANCE) 10 MG tablet, Take 1 tablet by mouth daily, Disp: 90 tablet, Rfl: 1    miconazole (MICOTIN) 2 % powder, Apply topically 2 times daily., Disp: 85 g, Rfl: 1    acetaZOLAMIDE (DIAMOX) 250 MG tablet, Take 1 tablet by mouth daily,

## 2025-05-14 NOTE — PLAN OF CARE
Problem: Chronic Conditions and Co-morbidities  Goal: Patient's chronic conditions and co-morbidity symptoms are monitored and maintained or improved  Flowsheets (Taken 5/14/2025 0639)  Care Plan - Patient's Chronic Conditions and Co-Morbidity Symptoms are Monitored and Maintained or Improved: Monitor and assess patient's chronic conditions and comorbid symptoms for stability, deterioration, or improvement

## 2025-05-15 ENCOUNTER — APPOINTMENT (OUTPATIENT)
Dept: ULTRASOUND IMAGING | Age: 70
DRG: 871 | End: 2025-05-15
Payer: MEDICARE

## 2025-05-15 ENCOUNTER — APPOINTMENT (OUTPATIENT)
Dept: GENERAL RADIOLOGY | Age: 70
DRG: 871 | End: 2025-05-15
Payer: MEDICARE

## 2025-05-15 PROBLEM — M79.89 SWELLING OF ARM: Status: ACTIVE | Noted: 2025-05-15

## 2025-05-15 LAB
AADO2: 277.1 MMHG
ALBUMIN SERPL-MCNC: 3.9 G/DL (ref 3.5–5.2)
ALP SERPL-CCNC: 83 U/L (ref 40–129)
ALT SERPL-CCNC: 31 U/L (ref 0–50)
ANION GAP SERPL CALCULATED.3IONS-SCNC: 17 MMOL/L (ref 7–16)
ANION GAP SERPL CALCULATED.3IONS-SCNC: 22 MMOL/L (ref 7–16)
AST SERPL-CCNC: 26 U/L (ref 0–50)
B PARAP IS1001 DNA NPH QL NAA+NON-PROBE: NOT DETECTED
B PERT DNA SPEC QL NAA+PROBE: NOT DETECTED
B.E.: -4.1 MMOL/L (ref -3–3)
BACTERIA URNS QL MICRO: ABNORMAL
BASOPHILS # BLD: 0.05 K/UL (ref 0–0.2)
BASOPHILS # BLD: 0.05 K/UL (ref 0–0.2)
BASOPHILS NFR BLD: 0 % (ref 0–2)
BASOPHILS NFR BLD: 1 % (ref 0–2)
BILIRUB SERPL-MCNC: 0.7 MG/DL (ref 0–1.2)
BILIRUB UR QL STRIP: NEGATIVE
BUN SERPL-MCNC: 130 MG/DL (ref 8–23)
BUN SERPL-MCNC: 145 MG/DL (ref 8–23)
C PNEUM DNA NPH QL NAA+NON-PROBE: NOT DETECTED
CALCIUM SERPL-MCNC: 8.7 MG/DL (ref 8.8–10.2)
CALCIUM SERPL-MCNC: 8.8 MG/DL (ref 8.8–10.2)
CHLORIDE SERPL-SCNC: 92 MMOL/L (ref 98–107)
CHLORIDE SERPL-SCNC: 93 MMOL/L (ref 98–107)
CK SERPL-CCNC: 29 U/L (ref 0–190)
CLARITY UR: CLEAR
CO2 SERPL-SCNC: 19 MMOL/L (ref 22–29)
CO2 SERPL-SCNC: 24 MMOL/L (ref 22–29)
COHB: 0.6 % (ref 0–1.5)
COLOR UR: YELLOW
CREAT SERPL-MCNC: 3.9 MG/DL (ref 0.7–1.2)
CREAT SERPL-MCNC: 4.2 MG/DL (ref 0.7–1.2)
CREAT UR-MCNC: 96.8 MG/DL (ref 40–278)
CREAT UR-MCNC: 97.4 MG/DL (ref 40–278)
CREAT UR-MCNC: NORMAL MG/DL (ref 39–259)
CREAT UR-SCNC: NORMAL MG/ X H
CREATINE 24H UR-MRATE: NORMAL MG/24 H (ref 1040–2350)
CRITICAL: ABNORMAL
DATE ANALYZED: ABNORMAL
DATE OF COLLECTION: ABNORMAL
EOSINOPHIL # BLD: 0.11 K/UL (ref 0.05–0.5)
EOSINOPHIL # BLD: 0.31 K/UL (ref 0.05–0.5)
EOSINOPHILS PERCENT, URINE: 0 % (ref 0–1)
EOSINOPHILS RELATIVE PERCENT: 1 % (ref 0–6)
EOSINOPHILS RELATIVE PERCENT: 3 % (ref 0–6)
ERYTHROCYTE [DISTWIDTH] IN BLOOD BY AUTOMATED COUNT: 16.5 % (ref 11.5–15)
ERYTHROCYTE [DISTWIDTH] IN BLOOD BY AUTOMATED COUNT: 16.9 % (ref 11.5–15)
FIO2: 60 %
FLUAV RNA NPH QL NAA+NON-PROBE: NOT DETECTED
FLUBV RNA NPH QL NAA+NON-PROBE: NOT DETECTED
FREE KAPPA/LAMBDA RATIO: 1.13 (ref 0.22–1.74)
GFR, ESTIMATED: 15 ML/MIN/1.73M2
GFR, ESTIMATED: 16 ML/MIN/1.73M2
GLUCOSE BLD-MCNC: 114 MG/DL (ref 74–99)
GLUCOSE SERPL-MCNC: 93 MG/DL (ref 74–99)
GLUCOSE SERPL-MCNC: 97 MG/DL (ref 74–99)
GLUCOSE UR STRIP-MCNC: NEGATIVE MG/DL
HADV DNA NPH QL NAA+NON-PROBE: NOT DETECTED
HAV IGM SERPL QL IA: NONREACTIVE
HBV CORE IGM SERPL QL IA: NONREACTIVE
HBV SURFACE AB SERPL IA-ACNC: <3.5 MIU/ML (ref 0–9.99)
HBV SURFACE AG SERPL QL IA: NONREACTIVE
HCO3: 21.6 MMOL/L (ref 22–26)
HCOV 229E RNA NPH QL NAA+NON-PROBE: NOT DETECTED
HCOV HKU1 RNA NPH QL NAA+NON-PROBE: NOT DETECTED
HCOV NL63 RNA NPH QL NAA+NON-PROBE: NOT DETECTED
HCOV OC43 RNA NPH QL NAA+NON-PROBE: NOT DETECTED
HCT VFR BLD AUTO: 23.2 % (ref 37–54)
HCT VFR BLD AUTO: 24.1 % (ref 37–54)
HCT VFR BLD AUTO: 24.3 % (ref 37–54)
HCT VFR BLD AUTO: 24.3 % (ref 37–54)
HCV AB SERPL QL IA: NONREACTIVE
HGB BLD-MCNC: 7.3 G/DL (ref 12.5–16.5)
HGB BLD-MCNC: 7.5 G/DL (ref 12.5–16.5)
HGB BLD-MCNC: 7.6 G/DL (ref 12.5–16.5)
HGB BLD-MCNC: 7.8 G/DL (ref 12.5–16.5)
HGB UR QL STRIP.AUTO: ABNORMAL
HHB: 2.5 % (ref 0–5)
HMPV RNA NPH QL NAA+NON-PROBE: NOT DETECTED
HOURS COLLECTED: NORMAL H
HPIV1 RNA NPH QL NAA+NON-PROBE: NOT DETECTED
HPIV2 RNA NPH QL NAA+NON-PROBE: NOT DETECTED
HPIV3 RNA NPH QL NAA+NON-PROBE: NOT DETECTED
HPIV4 RNA NPH QL NAA+NON-PROBE: NOT DETECTED
IMM GRANULOCYTES # BLD AUTO: 0.19 K/UL (ref 0–0.58)
IMM GRANULOCYTES # BLD AUTO: 0.36 K/UL (ref 0–0.58)
IMM GRANULOCYTES NFR BLD: 2 % (ref 0–5)
IMM GRANULOCYTES NFR BLD: 2 % (ref 0–5)
KAPPA LC FREE SER-MCNC: 241 MG/L
KETONES UR STRIP-MCNC: NEGATIVE MG/DL
LAB: ABNORMAL
LAMBDA LC FREE SERPL-MCNC: 213 MG/L (ref 4.2–27.7)
LEUKOCYTE ESTERASE UR QL STRIP: ABNORMAL
LYMPHOCYTES NFR BLD: 0.48 K/UL (ref 1.5–4)
LYMPHOCYTES NFR BLD: 0.71 K/UL (ref 1.5–4)
LYMPHOCYTES RELATIVE PERCENT: 2 % (ref 20–42)
LYMPHOCYTES RELATIVE PERCENT: 7 % (ref 20–42)
Lab: 2205
M PNEUMO DNA NPH QL NAA+NON-PROBE: NOT DETECTED
MAGNESIUM SERPL-MCNC: 2.1 MG/DL (ref 1.6–2.4)
MAGNESIUM SERPL-MCNC: 2.2 MG/DL (ref 1.6–2.4)
MCH RBC QN AUTO: 30.2 PG (ref 26–35)
MCH RBC QN AUTO: 30.7 PG (ref 26–35)
MCHC RBC AUTO-ENTMCNC: 31.1 G/DL (ref 32–34.5)
MCHC RBC AUTO-ENTMCNC: 31.5 G/DL (ref 32–34.5)
MCV RBC AUTO: 97.2 FL (ref 80–99.9)
MCV RBC AUTO: 97.5 FL (ref 80–99.9)
METHB: 0.7 % (ref 0–1.5)
MICROALBUMIN EXCRETION RATE: NORMAL MCG/MIN (ref 0–30)
MICROALBUMIN UR-MCNC: 18 MG/L (ref 0–20)
MICROALBUMIN UR-MCNC: NORMAL MG/L
MICROALBUMIN/CREAT UR-RTO: 19 MCG/MG CREAT (ref 0–30)
MICROALBUMIN/CREAT UR-RTO: NORMAL MG/G
MODE: ABNORMAL
MONOCYTES NFR BLD: 0.9 K/UL (ref 0.1–0.95)
MONOCYTES NFR BLD: 0.98 K/UL (ref 0.1–0.95)
MONOCYTES NFR BLD: 4 % (ref 2–12)
MONOCYTES NFR BLD: 9 % (ref 2–12)
NEUTROPHILS NFR BLD: 79 % (ref 43–80)
NEUTROPHILS NFR BLD: 91 % (ref 43–80)
NEUTS SEG NFR BLD: 20.53 K/UL (ref 1.8–7.3)
NEUTS SEG NFR BLD: 8.14 K/UL (ref 1.8–7.3)
NITRITE UR QL STRIP: NEGATIVE
O2 SATURATION: 97.5 % (ref 92–98.5)
O2HB: 96.2 % (ref 94–97)
OPERATOR ID: 405
PATIENT TEMP: 37 C
PCO2: 42.1 MMHG (ref 35–45)
PEEP/CPAP: 5 CMH2O
PFO2: 1.74 MMHG/%
PH BLOOD GAS: 7.33 (ref 7.35–7.45)
PH UR STRIP: 5.5 [PH] (ref 5–8)
PHOSPHATE SERPL-MCNC: 9.3 MG/DL (ref 2.5–4.5)
PHOSPHATE SERPL-MCNC: 9.3 MG/DL (ref 2.5–4.5)
PLATELET # BLD AUTO: 169 K/UL (ref 130–450)
PLATELET # BLD AUTO: 194 K/UL (ref 130–450)
PMV BLD AUTO: 10.1 FL (ref 7–12)
PMV BLD AUTO: 10.5 FL (ref 7–12)
PO2: 104.4 MMHG (ref 75–100)
POTASSIUM SERPL-SCNC: 5 MMOL/L (ref 3.5–5.1)
POTASSIUM SERPL-SCNC: 5 MMOL/L (ref 3.5–5.1)
PROCALCITONIN SERPL-MCNC: 0.29 NG/ML (ref 0–0.08)
PROT SERPL-MCNC: 6.8 G/DL (ref 6.4–8.3)
PROT UR STRIP-MCNC: NEGATIVE MG/DL
PS: 14 CMH20
RBC # BLD AUTO: 2.38 M/UL (ref 3.8–5.8)
RBC # BLD AUTO: 2.48 M/UL (ref 3.8–5.8)
RBC # BLD: ABNORMAL 10*6/UL
RBC #/AREA URNS HPF: ABNORMAL /HPF
RI(T): 2.65
RSV RNA NPH QL NAA+NON-PROBE: NOT DETECTED
RV+EV RNA NPH QL NAA+NON-PROBE: NOT DETECTED
SARS-COV-2 RNA NPH QL NAA+NON-PROBE: NOT DETECTED
SODIUM SERPL-SCNC: 133 MMOL/L (ref 136–145)
SODIUM SERPL-SCNC: 134 MMOL/L (ref 136–145)
SOURCE, BLOOD GAS: ABNORMAL
SP GR UR STRIP: 1.02 (ref 1–1.03)
SPECIMEN DESCRIPTION: NORMAL
SPECIMEN VOL 24H UR: NORMAL ML
THB: 8.1 G/DL (ref 11.5–16.5)
TIME ANALYZED: 2208
TIMED URINE MICROALBUMIN INTERP: NORMAL
TOTAL PROTEIN, URINE: 10 MG/DL (ref 0–12)
TROPONIN I SERPL HS-MCNC: 78 NG/L (ref 0–22)
URINE TOTAL PROTEIN CREATININE RATIO: 0.1 (ref 0–0.2)
UROBILINOGEN UR STRIP-ACNC: 0.2 EU/DL (ref 0–1)
WBC #/AREA URNS HPF: ABNORMAL /HPF
WBC OTHER # BLD: 10.3 K/UL (ref 4.5–11.5)
WBC OTHER # BLD: 22.5 K/UL (ref 4.5–11.5)

## 2025-05-15 PROCEDURE — 5A0945A ASSISTANCE WITH RESPIRATORY VENTILATION, 24-96 CONSECUTIVE HOURS, HIGH NASAL FLOW/VELOCITY: ICD-10-PCS | Performed by: INTERNAL MEDICINE

## 2025-05-15 PROCEDURE — 86317 IMMUNOASSAY INFECTIOUS AGENT: CPT

## 2025-05-15 PROCEDURE — 5A09457 ASSISTANCE WITH RESPIRATORY VENTILATION, 24-96 CONSECUTIVE HOURS, CONTINUOUS POSITIVE AIRWAY PRESSURE: ICD-10-PCS | Performed by: INTERNAL MEDICINE

## 2025-05-15 PROCEDURE — 84145 PROCALCITONIN (PCT): CPT

## 2025-05-15 PROCEDURE — 2700000000 HC OXYGEN THERAPY PER DAY

## 2025-05-15 PROCEDURE — 87449 NOS EACH ORGANISM AG IA: CPT

## 2025-05-15 PROCEDURE — 6360000002 HC RX W HCPCS: Performed by: INTERNAL MEDICINE

## 2025-05-15 PROCEDURE — 84165 PROTEIN E-PHORESIS SERUM: CPT

## 2025-05-15 PROCEDURE — 2580000003 HC RX 258

## 2025-05-15 PROCEDURE — 83521 IG LIGHT CHAINS FREE EACH: CPT

## 2025-05-15 PROCEDURE — 82805 BLOOD GASES W/O2 SATURATION: CPT

## 2025-05-15 PROCEDURE — 82550 ASSAY OF CK (CPK): CPT

## 2025-05-15 PROCEDURE — 6360000002 HC RX W HCPCS

## 2025-05-15 PROCEDURE — 6370000000 HC RX 637 (ALT 250 FOR IP)

## 2025-05-15 PROCEDURE — 80053 COMPREHEN METABOLIC PANEL: CPT

## 2025-05-15 PROCEDURE — 2580000003 HC RX 258: Performed by: INTERNAL MEDICINE

## 2025-05-15 PROCEDURE — 84100 ASSAY OF PHOSPHORUS: CPT

## 2025-05-15 PROCEDURE — 85014 HEMATOCRIT: CPT

## 2025-05-15 PROCEDURE — 94640 AIRWAY INHALATION TREATMENT: CPT

## 2025-05-15 PROCEDURE — 2500000003 HC RX 250 WO HCPCS

## 2025-05-15 PROCEDURE — 82962 GLUCOSE BLOOD TEST: CPT

## 2025-05-15 PROCEDURE — 93005 ELECTROCARDIOGRAM TRACING: CPT

## 2025-05-15 PROCEDURE — 84155 ASSAY OF PROTEIN SERUM: CPT

## 2025-05-15 PROCEDURE — 80074 ACUTE HEPATITIS PANEL: CPT

## 2025-05-15 PROCEDURE — 99223 1ST HOSP IP/OBS HIGH 75: CPT | Performed by: INTERNAL MEDICINE

## 2025-05-15 PROCEDURE — APPSS180 APP SPLIT SHARED TIME > 60 MINUTES: Performed by: NURSE PRACTITIONER

## 2025-05-15 PROCEDURE — 99233 SBSQ HOSP IP/OBS HIGH 50: CPT | Performed by: INTERNAL MEDICINE

## 2025-05-15 PROCEDURE — 2000000000 HC ICU R&B

## 2025-05-15 PROCEDURE — 6370000000 HC RX 637 (ALT 250 FOR IP): Performed by: INTERNAL MEDICINE

## 2025-05-15 PROCEDURE — 81001 URINALYSIS AUTO W/SCOPE: CPT

## 2025-05-15 PROCEDURE — 80048 BASIC METABOLIC PNL TOTAL CA: CPT

## 2025-05-15 PROCEDURE — 94664 DEMO&/EVAL PT USE INHALER: CPT

## 2025-05-15 PROCEDURE — 94660 CPAP INITIATION&MGMT: CPT

## 2025-05-15 PROCEDURE — 36415 COLL VENOUS BLD VENIPUNCTURE: CPT

## 2025-05-15 PROCEDURE — P9047 ALBUMIN (HUMAN), 25%, 50ML: HCPCS

## 2025-05-15 PROCEDURE — 85018 HEMOGLOBIN: CPT

## 2025-05-15 PROCEDURE — 71045 X-RAY EXAM CHEST 1 VIEW: CPT

## 2025-05-15 PROCEDURE — 87899 AGENT NOS ASSAY W/OPTIC: CPT

## 2025-05-15 PROCEDURE — 87205 SMEAR GRAM STAIN: CPT

## 2025-05-15 PROCEDURE — 30901 CONTROL OF NOSEBLEED: CPT | Performed by: OTOLARYNGOLOGY

## 2025-05-15 PROCEDURE — 51702 INSERT TEMP BLADDER CATH: CPT

## 2025-05-15 PROCEDURE — 84166 PROTEIN E-PHORESIS/URINE/CSF: CPT

## 2025-05-15 PROCEDURE — 0202U NFCT DS 22 TRGT SARS-COV-2: CPT

## 2025-05-15 PROCEDURE — 99222 1ST HOSP IP/OBS MODERATE 55: CPT | Performed by: OTOLARYNGOLOGY

## 2025-05-15 PROCEDURE — 84484 ASSAY OF TROPONIN QUANT: CPT

## 2025-05-15 PROCEDURE — 93971 EXTREMITY STUDY: CPT

## 2025-05-15 PROCEDURE — 85025 COMPLETE CBC W/AUTO DIFF WBC: CPT

## 2025-05-15 PROCEDURE — 76770 US EXAM ABDO BACK WALL COMP: CPT

## 2025-05-15 PROCEDURE — 84156 ASSAY OF PROTEIN URINE: CPT

## 2025-05-15 PROCEDURE — 83735 ASSAY OF MAGNESIUM: CPT

## 2025-05-15 RX ORDER — SODIUM CHLORIDE 0.9 % (FLUSH) 0.9 %
5-40 SYRINGE (ML) INJECTION EVERY 12 HOURS SCHEDULED
Status: DISCONTINUED | OUTPATIENT
Start: 2025-05-15 | End: 2025-05-29 | Stop reason: HOSPADM

## 2025-05-15 RX ORDER — MIDODRINE HYDROCHLORIDE 10 MG/1
10 TABLET ORAL
Status: DISCONTINUED | OUTPATIENT
Start: 2025-05-15 | End: 2025-05-18

## 2025-05-15 RX ORDER — SODIUM CHLORIDE 9 MG/ML
INJECTION, SOLUTION INTRAVENOUS PRN
Status: DISCONTINUED | OUTPATIENT
Start: 2025-05-15 | End: 2025-05-29 | Stop reason: HOSPADM

## 2025-05-15 RX ORDER — SODIUM CHLORIDE 0.9 % (FLUSH) 0.9 %
5-40 SYRINGE (ML) INJECTION PRN
Status: DISCONTINUED | OUTPATIENT
Start: 2025-05-15 | End: 2025-05-29 | Stop reason: HOSPADM

## 2025-05-15 RX ORDER — IPRATROPIUM BROMIDE AND ALBUTEROL SULFATE 2.5; .5 MG/3ML; MG/3ML
1 SOLUTION RESPIRATORY (INHALATION)
Status: DISCONTINUED | OUTPATIENT
Start: 2025-05-15 | End: 2025-05-29 | Stop reason: HOSPADM

## 2025-05-15 RX ORDER — DEXTROSE MONOHYDRATE 100 MG/ML
INJECTION, SOLUTION INTRAVENOUS CONTINUOUS PRN
Status: DISCONTINUED | OUTPATIENT
Start: 2025-05-15 | End: 2025-05-29 | Stop reason: HOSPADM

## 2025-05-15 RX ORDER — GLUCAGON 1 MG/ML
1 KIT INJECTION PRN
Status: DISCONTINUED | OUTPATIENT
Start: 2025-05-15 | End: 2025-05-29 | Stop reason: HOSPADM

## 2025-05-15 RX ORDER — OXYMETAZOLINE HYDROCHLORIDE 0.05 G/100ML
2 SPRAY NASAL 2 TIMES DAILY PRN
Status: DISPENSED | OUTPATIENT
Start: 2025-05-15 | End: 2025-05-18

## 2025-05-15 RX ORDER — SEVELAMER CARBONATE 0.8 G/1
0.8 POWDER, FOR SUSPENSION ORAL
Status: DISCONTINUED | OUTPATIENT
Start: 2025-05-15 | End: 2025-05-29 | Stop reason: HOSPADM

## 2025-05-15 RX ORDER — METOPROLOL SUCCINATE 25 MG/1
12.5 TABLET, EXTENDED RELEASE ORAL DAILY
Status: DISCONTINUED | OUTPATIENT
Start: 2025-05-16 | End: 2025-05-29 | Stop reason: HOSPADM

## 2025-05-15 RX ADMIN — SODIUM CHLORIDE, PRESERVATIVE FREE 10 ML: 5 INJECTION INTRAVENOUS at 21:56

## 2025-05-15 RX ADMIN — ONDANSETRON 4 MG: 2 INJECTION, SOLUTION INTRAMUSCULAR; INTRAVENOUS at 19:01

## 2025-05-15 RX ADMIN — BUMETANIDE 0.5 MG/HR: 0.25 INJECTION INTRAMUSCULAR; INTRAVENOUS at 22:23

## 2025-05-15 RX ADMIN — SEVELAMER CARBONATE FOR ORAL SUSPENSION 0.8 G: 800 POWDER, FOR SUSPENSION ORAL at 13:46

## 2025-05-15 RX ADMIN — ASPIRIN 81 MG: 81 TABLET, COATED ORAL at 08:57

## 2025-05-15 RX ADMIN — BUMETANIDE 2 MG: 0.25 INJECTION INTRAMUSCULAR; INTRAVENOUS at 18:56

## 2025-05-15 RX ADMIN — TAMSULOSIN HYDROCHLORIDE 0.4 MG: 0.4 CAPSULE ORAL at 08:55

## 2025-05-15 RX ADMIN — Medication 2000 UNITS: at 08:56

## 2025-05-15 RX ADMIN — SODIUM CHLORIDE, PRESERVATIVE FREE 10 ML: 5 INJECTION INTRAVENOUS at 08:56

## 2025-05-15 RX ADMIN — ALBUMIN (HUMAN) 25 G: 0.25 INJECTION, SOLUTION INTRAVENOUS at 02:53

## 2025-05-15 RX ADMIN — MIDODRINE HYDROCHLORIDE 5 MG: 5 TABLET ORAL at 08:41

## 2025-05-15 RX ADMIN — SERTRALINE 50 MG: 50 TABLET, FILM COATED ORAL at 08:56

## 2025-05-15 RX ADMIN — ATORVASTATIN CALCIUM 40 MG: 40 TABLET, FILM COATED ORAL at 08:56

## 2025-05-15 RX ADMIN — IPRATROPIUM BROMIDE AND ALBUTEROL SULFATE 1 DOSE: 2.5; .5 SOLUTION RESPIRATORY (INHALATION) at 11:57

## 2025-05-15 RX ADMIN — IPRATROPIUM BROMIDE AND ALBUTEROL SULFATE 1 DOSE: 2.5; .5 SOLUTION RESPIRATORY (INHALATION) at 09:36

## 2025-05-15 RX ADMIN — Medication 2 SPRAY: at 21:56

## 2025-05-15 RX ADMIN — SEVELAMER CARBONATE FOR ORAL SUSPENSION 0.8 G: 800 POWDER, FOR SUSPENSION ORAL at 17:25

## 2025-05-15 RX ADMIN — SALINE NASAL SPRAY 1 SPRAY: 1.5 SOLUTION NASAL at 21:56

## 2025-05-15 RX ADMIN — EPOETIN ALFA-EPBX 3000 UNITS: 3000 INJECTION, SOLUTION INTRAVENOUS; SUBCUTANEOUS at 22:23

## 2025-05-15 RX ADMIN — MIDODRINE HYDROCHLORIDE 5 MG: 5 TABLET ORAL at 12:23

## 2025-05-15 RX ADMIN — LEVOTHYROXINE SODIUM 100 MCG: 0.1 TABLET ORAL at 08:55

## 2025-05-15 RX ADMIN — BUMETANIDE 2 MG: 0.25 INJECTION INTRAMUSCULAR; INTRAVENOUS at 08:56

## 2025-05-15 RX ADMIN — MIDODRINE HYDROCHLORIDE 10 MG: 10 TABLET ORAL at 17:25

## 2025-05-15 RX ADMIN — SODIUM CHLORIDE 125 MG: 9 INJECTION, SOLUTION INTRAVENOUS at 21:54

## 2025-05-15 RX ADMIN — IPRATROPIUM BROMIDE AND ALBUTEROL SULFATE 1 DOSE: 2.5; .5 SOLUTION RESPIRATORY (INHALATION) at 19:40

## 2025-05-15 RX ADMIN — LEVOTHYROXINE SODIUM 50 MCG: 0.05 TABLET ORAL at 08:55

## 2025-05-15 ASSESSMENT — PAIN SCALES - GENERAL: PAINLEVEL_OUTOF10: 0

## 2025-05-15 NOTE — CARE COORDINATION
Pt admitted from CHF clinic, pt is a short term resident at [facility] for rehab, pt was at CHF clinic, labs were  creatinine 4.9, they reached out to [facility] recommended pt be sent to ER. Pt admitted for RADHA, cardio and renal consulted. Currently pt is down for an ultrasound. Called wife Francesca, plan is for pt to return to [facility], she is concerned that pt wasn't getting his meds or cream for his legs, called Giulia at [place] and notified and asking them to call wife. Pt is currently on 7LO2 vs NRB. Pt will need precert to return to [facility], will need therapy when pt is able to work with them. Envelope started, will need therapy for transport recommendations.JUAN Trevino LSW  .Case Management Assessment  Initial Evaluation    Date/Time of Evaluation: 5/15/2025 11:21 AM  Assessment Completed by: JUAN Trevino LSW    If patient is discharged prior to next notation, then this note serves as note for discharge by case management.    Patient Name: Wander Rocha                   YOB: 1955  Diagnosis: RADHA (acute kidney injury) [N17.9]                   Date / Time: 5/14/2025  2:21 PM    Patient Admission Status: Inpatient   Readmission Risk (Low < 19, Mod (19-27), High > 27): Readmission Risk Score: 23    Current PCP: Oly Crespo MD  PCP verified by ? Yes    Chart Reviewed: Yes      History Provided by: Spouse  Patient Orientation: Alert and Oriented    Patient Cognition: Alert    Hospitalization in the last 30 days (Readmission):  Yes    If yes, Readmission Assessment in  Navigator will be completed.    Advance Directives:      Code Status: Full Code   Patient's Primary Decision Maker is: Legal Next of Kin    Primary Decision Maker: Petra Rocha - Spouse - [phone]    Secondary Decision Maker: Rubina Kraus - Child - [phone]    Discharge Planning:    Patient lives with: Other (Comment) ([place]) Type of Home: Skilled  consulted. Currently pt is down for an ultrasound. Called wife Francesca, plan is for pt to return to [facility], she is concerned that pt wasn't getting his meds or cream for his legs, called Igulia at [place] and notified and asking them to call wife. Pt is currently on 7LO2 vs NRB. Pt will need precert to return to [facility], will need therapy when pt is able to work with them. Envelope started, will need therapy for transport recommendations     The Plan for Transition of Care is related to the following treatment goals of RADHA (acute kidney injury) [N17.9]    IF APPLICABLE: The Patient and/or patient representative Wander and his family were provided with a choice of provider and agrees with the discharge plan. Freedom of choice list with basic dialogue that supports the patient's individualized plan of care/goals and shares the quality data associated with the providers was provided to:     Patient Representative Name:       The Patient and/or Patient Representative Agree with the Discharge Plan?      Lina Horvath, MSW, LSW  Case Management Department  Ph: [phone] Fax: [phone]

## 2025-05-15 NOTE — CONSULTS
OTOLARYNGOLOGY  CONSULT NOTE  5/15/2025    Physician Consulted: Dr. Cohen  Reason for Consult: Epistaxis      HPI  Wander Rocha is a 69 y.o. male who ENT was consulted for evaluation of epistaxis.  Patient past medical history includes epistaxis, chronic home oxygen use, hypertension, hyperlipidemia, end-stage renal disease, atrial fibrillation, pacemaker, and others.  Patient presented from [facility] for concerns of RADHA.  Plans for patient to receive port and to start patient on dialysis.  Patient is known to our ENT service for epistaxis.  Patient reports epistaxis started this morning he is unsure what side.  He is on home oxygen however notes that it has been humidified at home.  He has been on oxygen at the hospital which has not been humidified.  Home medications include Eliquis and aspirin.  Currently patient is hypotensive all other vital signs within normal limits.  Patient is currently on 9 L of oxygen via nasal cannula.  Patient is not currently bleeding.      Review of Systems   Constitutional:  Negative for chills and fever.   HENT:  Positive for nosebleeds.    All other systems reviewed and are negative.      Past Medical History:   Diagnosis Date    RADHA (acute kidney injury) 03/10/2022    Atrial fibrillation (HCC)     Carpal tunnel syndrome     Colorectal polyps 04/15/2013    Diverticula of colon 04/15/2013    Encounter regarding vascular access for dialysis for ESRD (HCC) 03/12/2022    Hyperlipidemia     Hypertension     Hypothyroidism     Lung nodule     Lung nodules 04/15/2013    Nocturnal hypoxemia due to obesity 08/08/2013    Obesity     NIKO (obstructive sleep apnea) 08/08/2013    [facility]    Osteoarthritis     Pacemaker     DOI 10/3/2017 Medtronic; dual chamber; MRI conditional  Indication: Sinus node dysfunction     Pacemaker     DOI 10/3/2017  Medtronic; dual chamber; MRI conditional   Indication: Sinus node dysfunction       Pinched nerve     Lumbar     THE CHEST 05/14/2025 03:13:02 PM COMPARISON: Comparison to study on May 03, 2025. CLINICAL HISTORY: Leg swelling. FINDINGS: LUNGS AND PLEURA: Large right and small left pleural effusions. Bilateral lower lobe opacities. Increased perihilar interstitial markings are seen, compatible with volume overload. HEART AND MEDIASTINUM: Enlarged cardiac silhouette. Left-sided dual chamber pacer. BONES AND SOFT TISSUES: No acute osseous abnormality.     1. Large right and small left pleural effusions with bilateral lower lobe opacities and increased perihilar interstitial markings, compatible with volume overload. 2. Enlarged cardiac silhouette with left-sided dual chamber pacer.     Procedure  Procedure - Nasal Cauteryleft side of the patient was found to have prominent septalvessels in the Anterior portion of the septum.The nose was anesthetized with a mixture of lidocaine 2% and afrin nasal spraysprayed 1 times in the left nostril(s).The irritated area was then cauterized with a silver nitrate stick until a whitefrost covered the affected area and no bleeding was seen.  The nose was packed.     ASSESSMENT:  69 y.o. male with recurrent epistaxis in the setting of chronic oxygen use on Eliquis and aspirin without current active bleeding    PLAN:  Patient's home oxygen is humidified however has not been on humidified oxygen at hospital.  Strongly recommend keeping patient on humidified oxygen to prevent epistaxis in the setting of multiple anticoagulants.  As dissolvable packing is placed in left nare patient may require oxygen via facemask instead of nasal cannula that he is currently on.  Discussed with nursing and they are aware of this.  S/P left anterior septum chemical cauterization of prominent vessel.  Dissolvable packing placed. Keep packing in place. This will not need to be removed.   Hold blood thinners if possible for 1-2 weeks. Defer decision to primary medicine team.   Minimal oozing is to be expected over next

## 2025-05-15 NOTE — PLAN OF CARE
Problem: Safety - Adult  Goal: Free from fall injury  Outcome: Progressing     Problem: Chronic Conditions and Co-morbidities  Goal: Patient's chronic conditions and co-morbidity symptoms are monitored and maintained or improved  Outcome: Progressing     Problem: Discharge Planning  Goal: Discharge to home or other facility with appropriate resources  Outcome: Progressing     Problem: Skin/Tissue Integrity  Goal: Skin integrity remains intact  Description: 1.  Monitor for areas of redness and/or skin breakdown2.  Assess vascular access sites hourly3.  Every 4-6 hours minimum:  Change oxygen saturation probe site4.  Every 4-6 hours:  If on nasal continuous positive airway pressure, respiratory therapy assess nares and determine need for appliance change or resting period  Outcome: Progressing

## 2025-05-15 NOTE — CONSULTS
LABA1C 7.2 05/28/2024 10:10 AM     proBNP:   Lab Results   Component Value Date/Time    PROBNP 3,635 05/14/2025 03:11 PM    PROBNP 3,981 05/14/2025 08:00 AM    PROBNP 5,204 05/07/2025 06:21 AM    PROBNP 5,387 05/06/2025 06:30 AM    PROBNP 5,173 05/02/2025 06:18 AM     TROPONIN:  Lab Results   Component Value Date/Time    TROPHS 61 05/14/2025 04:51 PM    TROPHS 62 05/14/2025 03:11 PM    TROPHS 34 12/21/2023 07:10 PM    TROPHS 38 12/21/2023 04:59 PM    TROPHS 50 01/08/2023 01:17 AM     CK:  Lab Results   Component Value Date/Time    CKTOTAL 51 01/07/2023 08:38 PM    CKTOTAL 50 03/11/2022 04:21 AM    CKTOTAL 61 09/18/2021 04:01 AM     FASTING LIPID PANEL:  Lab Results   Component Value Date/Time    CHOL 83 05/01/2025 03:57 PM    CHOL 124 02/15/2024 08:45 AM    CHOL 118 11/16/2022 09:55 AM    CHOL 120 06/14/2021 11:40 AM    HDL 26 05/01/2025 03:57 PM    HDL 22 02/15/2024 08:45 AM    HDL 19 11/27/2023 07:46 AM    LDL 43 05/01/2025 03:57 PM    LDL 68 02/15/2024 08:45 AM    LDL 50 11/27/2023 07:46 AM    LDL 60 11/16/2022 09:55 AM    LDL 63 06/14/2021 11:40 AM    LDL 64 02/06/2020 09:06 AM    TRIG 75 05/01/2025 03:57 PM    TRIG 171 02/15/2024 08:45 AM    TRIG 176 11/16/2022 09:55 AM     LIVER PROFILE:  Recent Labs     05/14/25  1511 05/15/25  0606   AST 33 26   ALT 39 31     Assessment & Plan per Dr. Thorne    Electronically signed by JACQUELINE Zapata - CNP on 5/15/2025 at 4:12 PM     I have personally spent more than 51% of the total time involved in performing this consultation.   I have personally and separately seen and evaluated the patient.  I personally and separately obtained the history and performed the physical exam.  I personally reviewed all of the above labs, imaging, history, past medical history, social history, family history, surgical history, medications, review of systems, and data.  I reviewed available records.  All of the assessments and recommendations are personally from me.  I personally  sounds.   Neuro: Full ROM X 4, EOMI, no tremors.  EXT: Venous stasis with severe bilateral lower extremity edema  Skin: warm, dry, intact.  Good turgor.  Psych: A&O x 3, normal behavior, not anxious.    Patient seen and examined.  Chart, labs & data reviewed.    A:  Chronic heart failure with improved ejection fraction.  EF 50 to 55% on echo 4/29/2025.  Acute on chronic renal failure  Severe hypervolemia on exam.  Paroxysmal atrial fibrillation.  Follows with EP  PSVT.  Follows with EP  Pacemaker.  Follows with EP.  Hypertension  Diabetes  Sleep apnea.  Uses CPAP  Hypothyroidism  COPD  Marijuana use  Chronic anemia.  Worsening.  Probably due to his renal failure.  Morbid obesity  Carpal tunnel      Rec:  No blood pressure room to resume more titrate his cardiac medications.  Continue his cardiac medications.  Diuresis/volume management per nephrology.  Comorbidities per others.  No further cardiac testing or recommendations    Electronically signed by Anthony Thorne DO on 5/15/2025 at 6:35 PM    Note: This report was completed using computerized voice recognition software. Every effort has been made to ensure accuracy, however; and invert and computerized transcription errors may be present.

## 2025-05-15 NOTE — ED NOTES
Patient called nurse to bedside.  Forcefully coughing.  Small amount of bright red blood noted around mouth and on hands.  Vital signs stable.  Rhea Whitaker NP updated.   Pt called and she wants to know if there are any side effects for baclofen that will affect her kidneys   Her Pharmacy told her that she should contact her nephrologist regarding this med  She said her PCP prescribe her this medication and said it will be fine for her kidneys but looking in her chart she contacted [facility] as well so im not sure what's going on  She's been dizzy and her BP is high

## 2025-05-15 NOTE — PROGRESS NOTES
[facility]  Internal Medicine Residency Program  Progress Note - House Team 2    Patient:  Wander Rocha 69 y.o. male MRN: [id number]     Date of Service: 5/15/2025     CC: Shortness of breath.    Subjective     Patient was seen and examined this morning.  Patient was given Lasix this morning.  Nephrology consulted, patient might need dialysis.    Objective     Physical Exam:  Vitals: BP (!) 117/59   Pulse 72   Temp 97.5 °F (36.4 °C)   Resp 19   SpO2 96%     I & O - 24hr: I/O this shift:  In: -   Out: 400 [Urine:400]   I & O - 24hr: No intake/output data recorded.   General Appearance: alert, appears stated age, and cooperative  HEENT:  Head: Normal, normocephalic, atraumatic.  Neck:  JVD present  Lung: rales bilateral  Heart: regular rate and rhythm, S1, S2 normal, no murmur, click, rub or gallop  Abdomen: soft, non-tender; bowel sounds normal; no masses,  no organomegaly  Extremities:  edema 3  Musculokeletal: . ROM normal in all joints of extremities.   Neurologic: Mental status: Alert, oriented, thought content appropriate    Pertinent Labs & Imaging Studies     CBC:   Lab Results   Component Value Date/Time    WBC 10.3 05/15/2025 06:06 AM    RBC 2.48 05/15/2025 06:06 AM    HGB 7.6 05/15/2025 09:27 AM    HCT 24.3 05/15/2025 09:27 AM    MCV 97.2 05/15/2025 06:06 AM    MCH 30.2 05/15/2025 06:06 AM    MCHC 31.1 05/15/2025 06:06 AM    RDW 16.5 05/15/2025 06:06 AM     05/15/2025 06:06 AM    MPV 10.1 05/15/2025 06:06 AM     CBC with Differential:    Lab Results   Component Value Date/Time    WBC 10.3 05/15/2025 06:06 AM    RBC 2.48 05/15/2025 06:06 AM    HGB 7.6 05/15/2025 09:27 AM    HCT 24.3 05/15/2025 09:27 AM     05/15/2025 06:06 AM    MCV 97.2 05/15/2025 06:06 AM    MCH 30.2 05/15/2025 06:06 AM    MCHC 31.1 05/15/2025 06:06 AM    RDW 16.5 05/15/2025 06:06 AM    METASPCT 2 05/02/2025 02:37 PM    LYMPHOPCT 7 05/15/2025 06:06 AM    MONOPCT 9 05/15/2025 06:06 AM    MYELOPCT 1      Hepatic Function Panel:    Lab Results   Component Value Date/Time    ALKPHOS 83 05/15/2025 06:06 AM    ALT 31 05/15/2025 06:06 AM    AST 26 05/15/2025 06:06 AM    BILITOT 0.7 05/15/2025 06:06 AM    BILIDIR 0.5 05/03/2025 08:33 AM    IBILI 0.4 05/03/2025 08:33 AM     Albumin:  No results found for: \"LABALBU\"    Resident's Assessment and Plan       Cardiorenal syndrome.  RADHA on CKD secondary to cardiorenal syndrome.  Acute on chronic HFrEF.  Bilateral leg swelling likely secondary to acute on chronic heart failure, with third spacing  elevated troponin likely demand  Elevated proBNP   Concerns of bilateral pulmonary edema  Normocytic anemia likely secondary to anemia of chronic disease  history of A-fib with CRT placement on 4/23/2025, on Eliquis and low-dose beta-blocker  History of heart failure with preserved ejection fraction, last echo 55%  History of hypertension, on Toprol and Bumex  History of hyperlipidemia  History of type 2 diabetes mellitus  History of hypothyroidism, on Synthroid  History of NIKO, noncompliant with CPAP  Concern for restrictive lung disease likely secondary to obesity     Plan:    Status post 500 mL of normal saline in the ER, started albumin for intravascular depletion, midodrine added.  Continue albumin for total 6 doses, continue midodrine 5 mg 3 times daily.  Started on Bumex 2 mg IV twice daily today since blood pressure has been stable.  Blood pressure has been improved after midodrine and albumin, if blood pressure drops, we will have to start on dobutamine drip and transferred to medical ICU.  Cardiology consulted, will follow recs  Ace wraps added for bilateral leg edema with bed elevation  For RADHA, measure postvoidal PVR.  If urine retaining, will add Prince's.  Strict input output, avoid nephrotoxins.  Nephrology consulted, will follow recs  Eliquis on hold secondary to epistaxis, patient does have a history of chronic epistaxis, ENT consulted.  Follow urine sodium, urine

## 2025-05-15 NOTE — PROGRESS NOTES
Specialty Pharmacy Patient Management Program  Per Protocol Prescription Order or Refill     Patient will be filling or currently fills medications at [facility] and is enrolled in the Patient Management Program.    Requested Prescriptions     Signed Prescriptions Disp Refills    lenalidomide (REVLIMID) 2.5 MG capsule 63 capsule 0     Sig: Take 3 capsules by mouth Daily. Take for 21 days on then 7 days off.     Prescription orders above were sent to the pharmacy per Collaborative Care Agreement Protocol.     Sandra Hernandez PharmD, [facility]  Clinical Specialty Pharmacist, Oncology  4/8/2025  08:48 EDT       Patient voided 150ml. Bladder scanned 25ml post void residual.

## 2025-05-15 NOTE — PROGRESS NOTES
4 Eyes Skin Assessment     NAME:  Wander Rocha  YOB: 1955  MEDICAL RECORD NUMBER:  [id number]    The patient is being assessed for  Admission    I agree that at least one RN has performed a thorough Head to Toe Skin Assessment on the patient. ALL assessment sites listed below have been assessed.      Areas assessed by both nurses:    Head, Face, Ears, Shoulders, Back, Chest, Arms, Elbows, Hands, Sacrum. Buttock, Coccyx, Ischium, and Legs. Feet and Heels        Does the Patient have a Wound? No noted wound(s)       Aric Prevention initiated by RN: Yes  Wound Care Orders initiated by RN: No    Pressure Injury (Stage 3,4, Unstageable, DTI, NWPT, and Complex wounds) if present, place Wound referral order by RN under : No    New Ostomies, if present place, Ostomy referral order under : No     Nurse 1 eSignature: Electronically signed by Deonna Mas RN on 5/15/25 at 4:59 PM EDT    **SHARE this note so that the co-signing nurse can place an eSignature**    Nurse 2 eSignature: Electronically signed by Jessica Guido RN on 5/15/25 at 5:00 PM EDT

## 2025-05-15 NOTE — CONSULTS
Department of Internal Medicine  Nephrology Attending Consult Note      Reason for Consult:  RADHA on CKD  Requesting Physician:  Valeria Crabtree MD     CHIEF COMPLAINT:  abnormal lab    History Obtained From:  patient, electronic medical record    HISTORY OF PRESENT ILLNESS:  Briefly, Wander Rocha is a 69-year-old male known to us, past medical history CKD stage 3b probably 2/2 nephrosclerosis and previous episode of ischemic ATN requiring temporary HD in May 2017, recurrent episode of ischemic ATN in March 2022 also requiring HD x2 with fair recovery of renal function, baseline creatinine 2.0 mg/dL, HTN, DM type II, A. fib on apixaban, HFpEF 50-55%, NIKO, pacemaker placement, hypothyroidism, hyperlipidemia, who was recently discharged and readmitted on 5/14/2025 for abnormal labs.  Lab work showed sodium 130, potassium 5.5 mmol/L, , creatinine 4.9 mL grams per deciliter, proBNP 3981, reason for this consultation.  Chest x-ray shows bilateral pleural effusions.  Significant home medications include Jardiance, acetazolamide, Bumex, lisinopril and metoprolol.    Past Medical History:        Diagnosis Date    RADHA (acute kidney injury) 03/10/2022    Atrial fibrillation (HCC)     Carpal tunnel syndrome     Colorectal polyps 04/15/2013    Diverticula of colon 04/15/2013    Encounter regarding vascular access for dialysis for ESRD (HCC) 03/12/2022    Hyperlipidemia     Hypertension     Hypothyroidism     Lung nodule     Lung nodules 04/15/2013    Nocturnal hypoxemia due to obesity 08/08/2013    Obesity     NIKO (obstructive sleep apnea) 08/08/2013    [facility]    Osteoarthritis     Pacemaker     DOI 10/3/2017 Medtronic; dual chamber; MRI conditional  Indication: Sinus node dysfunction     Pacemaker     DOI 10/3/2017  Medtronic; dual chamber; MRI conditional   Indication: Sinus node dysfunction       Pinched nerve     Lumbar    Restrictive lung disease secondary to obesity 07/25/2016    S/P laminectomy with

## 2025-05-15 NOTE — PROGRESS NOTES
Messaged Dr. Parkinson via [employer] to notify that patient is having labored breathing at rest. Saturating 96% on 15L nonrebreather. MD coming to bedside.

## 2025-05-15 NOTE — PROGRESS NOTES
Messaged Dr. Rosenberg via [employer] to notify of BUN and phosphorus levels on morning labs.    Dr. Rosenberg not on today. Messaged Dr. Ayoub to notify of labs.

## 2025-05-15 NOTE — PROGRESS NOTES
Messaged Dr. Alejandro via [email or web address] to see if eliquis or aspirin should be held this morning due to patient having nose bleed. Eliquis to be held, and aspirin okay to given per MD.

## 2025-05-15 NOTE — PROGRESS NOTES
Called respiratory to request breathing treatment be given now since patient was not given it earlier.

## 2025-05-16 ENCOUNTER — APPOINTMENT (OUTPATIENT)
Dept: GENERAL RADIOLOGY | Age: 70
DRG: 871 | End: 2025-05-16
Payer: MEDICARE

## 2025-05-16 PROBLEM — J96.01 ACUTE HYPOXIC RESPIRATORY FAILURE (HCC): Status: ACTIVE | Noted: 2025-05-16

## 2025-05-16 LAB
AADO2: 276.5 MMHG
ALBUMIN SERPL-MCNC: 3.5 G/DL (ref 3.5–5.2)
ALBUMIN SERPL-MCNC: 3.6 G/DL (ref 3.5–4.7)
ALP SERPL-CCNC: 75 U/L (ref 40–129)
ALPHA1 GLOB SERPL ELPH-MCNC: 0.4 G/DL (ref 0.2–0.4)
ALPHA2 GLOB SERPL ELPH-MCNC: 0.6 G/DL (ref 0.5–1)
ALT SERPL-CCNC: 26 U/L (ref 0–50)
ANION GAP SERPL CALCULATED.3IONS-SCNC: 20 MMOL/L (ref 7–16)
AST SERPL-CCNC: 24 U/L (ref 0–50)
B-GLOBULIN SERPL ELPH-MCNC: 1 G/DL (ref 0.8–1.3)
B.E.: -3.5 MMOL/L (ref -3–3)
BASOPHILS # BLD: 0 K/UL (ref 0–0.2)
BASOPHILS NFR BLD: 0 % (ref 0–2)
BILIRUB SERPL-MCNC: 0.8 MG/DL (ref 0–1.2)
BUN SERPL-MCNC: 149 MG/DL (ref 8–23)
CALCIUM SERPL-MCNC: 8.3 MG/DL (ref 8.8–10.2)
CHLORIDE SERPL-SCNC: 94 MMOL/L (ref 98–107)
CLOT ANGLE.KAOLIN INDUCED BLD RES TEG: 69.3 DEG (ref 53–70)
CO2 SERPL-SCNC: 21 MMOL/L (ref 22–29)
COHB: 0.8 % (ref 0–1.5)
CREAT SERPL-MCNC: 4.3 MG/DL (ref 0.7–1.2)
CRITICAL: ABNORMAL
DATE ANALYZED: ABNORMAL
DATE OF COLLECTION: ABNORMAL
EKG ATRIAL RATE: 59 BPM
EKG ATRIAL RATE: 83 BPM
EKG P AXIS: 61 DEGREES
EKG P-R INTERVAL: 186 MS
EKG Q-T INTERVAL: 378 MS
EKG Q-T INTERVAL: 468 MS
EKG QRS DURATION: 160 MS
EKG QRS DURATION: 168 MS
EKG QTC CALCULATION (BAZETT): 452 MS
EKG QTC CALCULATION (BAZETT): 475 MS
EKG R AXIS: 106 DEGREES
EKG R AXIS: 120 DEGREES
EKG T AXIS: 249 DEGREES
EKG T AXIS: 60 DEGREES
EKG VENTRICULAR RATE: 62 BPM
EKG VENTRICULAR RATE: 86 BPM
EOSINOPHIL # BLD: 0 K/UL (ref 0.05–0.5)
EOSINOPHILS RELATIVE PERCENT: 0 % (ref 0–6)
EPL-TEG: 0.4 % (ref 0–15)
ERYTHROCYTE [DISTWIDTH] IN BLOOD BY AUTOMATED COUNT: 16.8 % (ref 11.5–15)
FIO2: 60 %
G-TEG: 12 KDYN/CM2 (ref 4.5–11)
GAMMA GLOB SERPL ELPH-MCNC: 1.4 G/DL (ref 0.7–1.6)
GFR, ESTIMATED: 14 ML/MIN/1.73M2
GLUCOSE BLD-MCNC: 123 MG/DL (ref 74–99)
GLUCOSE SERPL-MCNC: 77 MG/DL (ref 74–99)
HCO3: 22.1 MMOL/L (ref 22–26)
HCT VFR BLD AUTO: 20.1 % (ref 37–54)
HCT VFR BLD AUTO: 23.4 % (ref 37–54)
HCT VFR BLD AUTO: 26 % (ref 37–54)
HGB BLD-MCNC: 6.3 G/DL (ref 12.5–16.5)
HGB BLD-MCNC: 7.6 G/DL (ref 12.5–16.5)
HGB BLD-MCNC: 8.4 G/DL (ref 12.5–16.5)
HHB: 2.5 % (ref 0–5)
IMM RETICS NFR: 21.7 % (ref 2.3–13.4)
INR PPP: 2
KINETICS TEG: 1.5 MIN (ref 1–3)
L PNEUMO1 AG UR QL IA.RAPID: NEGATIVE
LAB: ABNORMAL
LACTATE BLDV-SCNC: 1.1 MMOL/L (ref 0.5–2.2)
LIPASE SERPL-CCNC: 18 U/L (ref 13–60)
LY30 (LYSIS) TEG: 0.4 % (ref 0–8)
LYMPHOCYTES NFR BLD: 1.22 K/UL (ref 1.5–4)
LYMPHOCYTES RELATIVE PERCENT: 4 % (ref 20–42)
Lab: 444
MA (MAX CLOT) TEG: 70.6 MM (ref 50–70)
MAGNESIUM SERPL-MCNC: 2.1 MG/DL (ref 1.6–2.4)
MCH RBC QN AUTO: 30.1 PG (ref 26–35)
MCHC RBC AUTO-ENTMCNC: 31.3 G/DL (ref 32–34.5)
MCV RBC AUTO: 96.2 FL (ref 80–99.9)
METHB: 0.8 % (ref 0–1.5)
MODE: ABNORMAL
MONOCYTES NFR BLD: 1.71 K/UL (ref 0.1–0.95)
MONOCYTES NFR BLD: 6 % (ref 2–12)
NEUTROPHILS NFR BLD: 90 % (ref 43–80)
NEUTS SEG NFR BLD: 25.17 K/UL (ref 1.8–7.3)
O2 SATURATION: 97.5 % (ref 92–98.5)
O2HB: 95.9 % (ref 94–97)
OPERATOR ID: 914
PATHOLOGIST: NORMAL
PATIENT TEMP: 37 C
PCO2: 42.2 MMHG (ref 35–45)
PEEP/CPAP: 5 CMH2O
PFO2: 1.75 MMHG/%
PH BLOOD GAS: 7.34 (ref 7.35–7.45)
PHOSPHATE SERPL-MCNC: 8.7 MG/DL (ref 2.5–4.5)
PIP: 14 CMH2O
PLATELET # BLD AUTO: 148 K/UL (ref 130–450)
PMV BLD AUTO: 10.5 FL (ref 7–12)
PO2: 104.9 MMHG (ref 75–100)
POTASSIUM SERPL-SCNC: 5.2 MMOL/L (ref 3.5–5.1)
PROCALCITONIN SERPL-MCNC: 2.29 NG/ML (ref 0–0.08)
PROT PATTERN SERPL ELPH-IMP: NORMAL
PROT SERPL-MCNC: 6.2 G/DL (ref 6.4–8.3)
PROT SERPL-MCNC: 7 G/DL (ref 6.4–8.3)
PROTHROMBIN TIME: 22.3 SEC (ref 9.3–12.4)
RBC # BLD AUTO: 2.09 M/UL (ref 3.8–5.8)
RBC # BLD: ABNORMAL 10*6/UL
REACTION TIME TEG: 7.7 MIN (ref 5–10)
RETIC HEMOGLOBIN: 35.7 PG (ref 28.2–36.6)
RETICS # AUTO: 0.07 M/UL
RETICS/RBC NFR AUTO: 3.2 % (ref 0.4–1.9)
RI(T): 2.64
RR MECHANICAL: 16 B/MIN
S PNEUM AG SPEC QL: NEGATIVE
SODIUM SERPL-SCNC: 135 MMOL/L (ref 136–145)
SOURCE, BLOOD GAS: ABNORMAL
SPECIMEN SOURCE: NORMAL
THB: 7.3 G/DL (ref 11.5–16.5)
TIME ANALYZED: 447
TROPONIN I SERPL HS-MCNC: 91 NG/L (ref 0–22)
WBC OTHER # BLD: 28.1 K/UL (ref 4.5–11.5)

## 2025-05-16 PROCEDURE — 2580000003 HC RX 258

## 2025-05-16 PROCEDURE — 93010 ELECTROCARDIOGRAM REPORT: CPT | Performed by: INTERNAL MEDICINE

## 2025-05-16 PROCEDURE — 83735 ASSAY OF MAGNESIUM: CPT

## 2025-05-16 PROCEDURE — 94660 CPAP INITIATION&MGMT: CPT

## 2025-05-16 PROCEDURE — 36620 INSERTION CATHETER ARTERY: CPT

## 2025-05-16 PROCEDURE — 84484 ASSAY OF TROPONIN QUANT: CPT

## 2025-05-16 PROCEDURE — 36556 INSERT NON-TUNNEL CV CATH: CPT

## 2025-05-16 PROCEDURE — 90935 HEMODIALYSIS ONE EVALUATION: CPT

## 2025-05-16 PROCEDURE — 80053 COMPREHEN METABOLIC PANEL: CPT

## 2025-05-16 PROCEDURE — 5A1D70Z PERFORMANCE OF URINARY FILTRATION, INTERMITTENT, LESS THAN 6 HOURS PER DAY: ICD-10-PCS | Performed by: INTERNAL MEDICINE

## 2025-05-16 PROCEDURE — 99233 SBSQ HOSP IP/OBS HIGH 50: CPT | Performed by: INTERNAL MEDICINE

## 2025-05-16 PROCEDURE — 82805 BLOOD GASES W/O2 SATURATION: CPT

## 2025-05-16 PROCEDURE — 6360000002 HC RX W HCPCS

## 2025-05-16 PROCEDURE — 6360000002 HC RX W HCPCS: Performed by: INTERNAL MEDICINE

## 2025-05-16 PROCEDURE — 36556 INSERT NON-TUNNEL CV CATH: CPT | Performed by: INTERNAL MEDICINE

## 2025-05-16 PROCEDURE — 99291 CRITICAL CARE FIRST HOUR: CPT | Performed by: INTERNAL MEDICINE

## 2025-05-16 PROCEDURE — 85347 COAGULATION TIME ACTIVATED: CPT

## 2025-05-16 PROCEDURE — 30233N1 TRANSFUSION OF NONAUTOLOGOUS RED BLOOD CELLS INTO PERIPHERAL VEIN, PERCUTANEOUS APPROACH: ICD-10-PCS | Performed by: INTERNAL MEDICINE

## 2025-05-16 PROCEDURE — 99232 SBSQ HOSP IP/OBS MODERATE 35: CPT | Performed by: INTERNAL MEDICINE

## 2025-05-16 PROCEDURE — U0001 2019-NCOV DIAGNOSTIC P: HCPCS | Performed by: INTERNAL MEDICINE

## 2025-05-16 PROCEDURE — 86923 COMPATIBILITY TEST ELECTRIC: CPT

## 2025-05-16 PROCEDURE — 86900 BLOOD TYPING SEROLOGIC ABO: CPT

## 2025-05-16 PROCEDURE — 36430 TRANSFUSION BLD/BLD COMPNT: CPT

## 2025-05-16 PROCEDURE — 2000000000 HC ICU R&B

## 2025-05-16 PROCEDURE — 85045 AUTOMATED RETICULOCYTE COUNT: CPT

## 2025-05-16 PROCEDURE — 36415 COLL VENOUS BLD VENIPUNCTURE: CPT

## 2025-05-16 PROCEDURE — 85014 HEMATOCRIT: CPT

## 2025-05-16 PROCEDURE — 85018 HEMOGLOBIN: CPT

## 2025-05-16 PROCEDURE — 84100 ASSAY OF PHOSPHORUS: CPT

## 2025-05-16 PROCEDURE — 83605 ASSAY OF LACTIC ACID: CPT

## 2025-05-16 PROCEDURE — 83690 ASSAY OF LIPASE: CPT

## 2025-05-16 PROCEDURE — 85025 COMPLETE CBC W/AUTO DIFF WBC: CPT

## 2025-05-16 PROCEDURE — 93005 ELECTROCARDIOGRAM TRACING: CPT

## 2025-05-16 PROCEDURE — 06HY33Z INSERTION OF INFUSION DEVICE INTO LOWER VEIN, PERCUTANEOUS APPROACH: ICD-10-PCS | Performed by: INTERNAL MEDICINE

## 2025-05-16 PROCEDURE — 6370000000 HC RX 637 (ALT 250 FOR IP)

## 2025-05-16 PROCEDURE — 36620 INSERTION CATHETER ARTERY: CPT | Performed by: INTERNAL MEDICINE

## 2025-05-16 PROCEDURE — 86901 BLOOD TYPING SEROLOGIC RH(D): CPT

## 2025-05-16 PROCEDURE — 2500000003 HC RX 250 WO HCPCS: Performed by: INTERNAL MEDICINE

## 2025-05-16 PROCEDURE — 85576 BLOOD PLATELET AGGREGATION: CPT

## 2025-05-16 PROCEDURE — 71045 X-RAY EXAM CHEST 1 VIEW: CPT

## 2025-05-16 PROCEDURE — 87040 BLOOD CULTURE FOR BACTERIA: CPT

## 2025-05-16 PROCEDURE — 86850 RBC ANTIBODY SCREEN: CPT

## 2025-05-16 PROCEDURE — 2500000003 HC RX 250 WO HCPCS

## 2025-05-16 PROCEDURE — 84145 PROCALCITONIN (PCT): CPT

## 2025-05-16 PROCEDURE — P9016 RBC LEUKOCYTES REDUCED: HCPCS

## 2025-05-16 PROCEDURE — 82962 GLUCOSE BLOOD TEST: CPT

## 2025-05-16 PROCEDURE — 3E033XZ INTRODUCTION OF VASOPRESSOR INTO PERIPHERAL VEIN, PERCUTANEOUS APPROACH: ICD-10-PCS

## 2025-05-16 PROCEDURE — 03HY32Z INSERTION OF MONITORING DEVICE INTO UPPER ARTERY, PERCUTANEOUS APPROACH: ICD-10-PCS | Performed by: INTERNAL MEDICINE

## 2025-05-16 PROCEDURE — 85390 FIBRINOLYSINS SCREEN I&R: CPT

## 2025-05-16 PROCEDURE — 94640 AIRWAY INHALATION TREATMENT: CPT

## 2025-05-16 PROCEDURE — 85384 FIBRINOGEN ACTIVITY: CPT

## 2025-05-16 PROCEDURE — 85610 PROTHROMBIN TIME: CPT

## 2025-05-16 RX ORDER — 0.9 % SODIUM CHLORIDE 0.9 %
1000 INTRAVENOUS SOLUTION INTRAVENOUS ONCE
Status: COMPLETED | OUTPATIENT
Start: 2025-05-16 | End: 2025-05-16

## 2025-05-16 RX ORDER — NOREPINEPHRINE BITARTRATE 0.06 MG/ML
1-100 INJECTION, SOLUTION INTRAVENOUS CONTINUOUS
Status: DISCONTINUED | OUTPATIENT
Start: 2025-05-16 | End: 2025-05-17

## 2025-05-16 RX ORDER — HEPARIN SODIUM 1000 [USP'U]/ML
INJECTION, SOLUTION INTRAVENOUS; SUBCUTANEOUS
Status: DISPENSED
Start: 2025-05-16 | End: 2025-05-16

## 2025-05-16 RX ORDER — DOBUTAMINE HYDROCHLORIDE 200 MG/100ML
2.5-1 INJECTION INTRAVENOUS CONTINUOUS
Status: DISCONTINUED | OUTPATIENT
Start: 2025-05-16 | End: 2025-05-16

## 2025-05-16 RX ORDER — SODIUM CHLORIDE 9 MG/ML
INJECTION, SOLUTION INTRAVENOUS PRN
Status: DISCONTINUED | OUTPATIENT
Start: 2025-05-16 | End: 2025-05-29 | Stop reason: HOSPADM

## 2025-05-16 RX ADMIN — PIPERACILLIN AND TAZOBACTAM 4500 MG: 4; .5 INJECTION, POWDER, FOR SOLUTION INTRAVENOUS at 06:49

## 2025-05-16 RX ADMIN — SODIUM CHLORIDE, PRESERVATIVE FREE 10 ML: 5 INJECTION INTRAVENOUS at 20:46

## 2025-05-16 RX ADMIN — HYDROMORPHONE HYDROCHLORIDE 0.25 MG: 1 INJECTION, SOLUTION INTRAMUSCULAR; INTRAVENOUS; SUBCUTANEOUS at 21:27

## 2025-05-16 RX ADMIN — SALINE NASAL SPRAY 1 SPRAY: 1.5 SOLUTION NASAL at 20:46

## 2025-05-16 RX ADMIN — IPRATROPIUM BROMIDE AND ALBUTEROL SULFATE 1 DOSE: 2.5; .5 SOLUTION RESPIRATORY (INHALATION) at 20:25

## 2025-05-16 RX ADMIN — PIPERACILLIN AND TAZOBACTAM 4500 MG: 4; .5 INJECTION, POWDER, LYOPHILIZED, FOR SOLUTION INTRAVENOUS at 19:06

## 2025-05-16 RX ADMIN — SODIUM CHLORIDE, PRESERVATIVE FREE 40 MG: 5 INJECTION INTRAVENOUS at 12:08

## 2025-05-16 RX ADMIN — VANCOMYCIN HYDROCHLORIDE 2500 MG: 10 INJECTION, POWDER, LYOPHILIZED, FOR SOLUTION INTRAVENOUS at 08:03

## 2025-05-16 RX ADMIN — Medication 25 MCG/MIN: at 22:55

## 2025-05-16 RX ADMIN — Medication 5 MCG/MIN: at 05:37

## 2025-05-16 RX ADMIN — IPRATROPIUM BROMIDE AND ALBUTEROL SULFATE 1 DOSE: 2.5; .5 SOLUTION RESPIRATORY (INHALATION) at 12:28

## 2025-05-16 RX ADMIN — IPRATROPIUM BROMIDE AND ALBUTEROL SULFATE 1 DOSE: 2.5; .5 SOLUTION RESPIRATORY (INHALATION) at 15:36

## 2025-05-16 RX ADMIN — SODIUM CHLORIDE 1000 ML: 0.9 INJECTION, SOLUTION INTRAVENOUS at 12:11

## 2025-05-16 RX ADMIN — IPRATROPIUM BROMIDE AND ALBUTEROL SULFATE 1 DOSE: 2.5; .5 SOLUTION RESPIRATORY (INHALATION) at 09:37

## 2025-05-16 ASSESSMENT — PAIN SCALES - GENERAL
PAINLEVEL_OUTOF10: 3
PAINLEVEL_OUTOF10: 0
PAINLEVEL_OUTOF10: 3
PAINLEVEL_OUTOF10: 3

## 2025-05-16 NOTE — PROGRESS NOTES
.44 Eyes Skin Assessment     NAME:  Wander Rocha  YOB: 1955  MEDICAL RECORD NUMBER:  [id number]    The patient is being assessed for  Admission    I agree that at least one RN has performed a thorough Head to Toe Skin Assessment on the patient. ALL assessment sites listed below have been assessed.      Areas assessed by both nurses:    Head, Face, Ears, Shoulders, Back, Chest, Arms, Elbows, Hands, Sacrum. Buttock, Coccyx, Ischium, Legs. Feet and Heels, and Under Medical Devices         Does the Patient have a Wound? No noted wound(s)         Aric Prevention initiated by RN: Yes  Wound Care Orders initiated by RN: No    Pressure Injury (Stage 3,4, Unstageable, DTI, NWPT, and Complex wounds) if present, place Wound referral order by RN under : No    New Ostomies, if present place, Ostomy referral order under : No     Nurse 1 eSignature: Electronically signed by Nydia Collins RN on 5/16/25 at 4:51 AM EDT    **SHARE this note so that the co-signing nurse can place an eSignature**    Nurse 2 eSignature: Electronically signed by Yolande Perez RN on 5/16/25 at 5:13 AM EDT

## 2025-05-16 NOTE — PROGRESS NOTES
[facility]  Internal Medicine Residency Program  Progress Note - House Team 2    Patient:  Wander Rocha 69 y.o. male MRN: [id number]     Date of Service: 5/16/2025     CC: Shortness of breath.    Subjective     Patient was seen in medical ICU.  Overnight, patient desaturated on 15 L high flow nasal cannula.  Patient also had increased respiratory effort, patient was placed on BiPAP and transferred to medical ICU.  Patient was also started on Bumex drip and on Levophed, nephrology following.  Scheduled to start on dialysis today.    Objective     Physical Exam:  Vitals: BP (!) 94/39   Pulse 60   Temp 98.4 °F (36.9 °C) (Bladder)   Resp 19   Ht 1.702 m (5' 7\")   Wt 99.5 kg (219 lb 5.7 oz)   SpO2 100%   BMI 34.36 kg/m²     I & O - 24hr: I/O this shift:  In: 40 [Other:40]  Out: 160 [Urine:160]   I & O - 24hr: No intake/output data recorded.   General Appearance: alert, appears stated age, and cooperative  HEENT:  Head: Normal, normocephalic, atraumatic.  Neck:  JVD present  Lung: rales bilateral, currently on BiPAP.  Heart: regular rate and rhythm, S1, S2 normal, no murmur, click, rub or gallop  Abdomen: soft, non-tender; bowel sounds normal; no masses,  no organomegaly  Extremities:  edema 3 +_  Musculokeletal: . ROM normal in all joints of extremities.   Neurologic: Mental status: Alert, oriented, thought content appropriate    Pertinent Labs & Imaging Studies     CBC:   Lab Results   Component Value Date/Time    WBC 28.1 05/16/2025 04:05 AM    RBC 2.09 05/16/2025 04:05 AM    HGB 6.3 05/16/2025 04:05 AM    HCT 20.1 05/16/2025 04:05 AM    MCV 96.2 05/16/2025 04:05 AM    MCH 30.1 05/16/2025 04:05 AM    MCHC 31.3 05/16/2025 04:05 AM    RDW 16.8 05/16/2025 04:05 AM     05/16/2025 04:05 AM    MPV 10.5 05/16/2025 04:05 AM     CBC with Differential:    Lab Results   Component Value Date/Time    WBC 28.1 05/16/2025 04:05 AM    RBC 2.09 05/16/2025 04:05 AM    HGB 6.3 05/16/2025 04:05 AM

## 2025-05-16 NOTE — PROGRESS NOTES
Discussed patient and IR procedure with bedside RN, all questions answered. Patient has 1 PRBC ordered, will watch for repeat labs. Will call if able to send for patient.

## 2025-05-16 NOTE — PLAN OF CARE
Problem: Safety - Adult  Goal: Free from fall injury  5/15/2025 2325 by Nydia Collins, RN  Outcome: Progressing  Flowsheets (Taken 5/15/2025 2300)  Free From Fall Injury:   Instruct family/caregiver on patient safety   Based on caregiver fall risk screen, instruct family/caregiver to ask for assistance with transferring infant if caregiver noted to have fall risk factors     Problem: Chronic Conditions and Co-morbidities  Goal: Patient's chronic conditions and co-morbidity symptoms are monitored and maintained or improved  5/15/2025 2325 by Nydia Collins, RN  Outcome: Progressing  Flowsheets (Taken 5/15/2025 2032)  Care Plan - Patient's Chronic Conditions and Co-Morbidity Symptoms are Monitored and Maintained or Improved:   Monitor and assess patient's chronic conditions and comorbid symptoms for stability, deterioration, or improvement   Update acute care plan with appropriate goals if chronic or comorbid symptoms are exacerbated and prevent overall improvement and discharge   Collaborate with multidisciplinary team to address chronic and comorbid conditions and prevent exacerbation or deterioration     Problem: Discharge Planning  Goal: Discharge to home or other facility with appropriate resources  5/15/2025 2325 by Nydia Collins, RN  Outcome: Progressing  Flowsheets (Taken 5/15/2025 2032)  Discharge to home or other facility with appropriate resources:   Identify barriers to discharge with patient and caregiver   Arrange for needed discharge resources and transportation as appropriate   Refer to discharge planning if patient needs post-hospital services based on physician order or complex needs related to functional status, cognitive ability or social support system     Problem: Skin/Tissue Integrity  Goal: Skin integrity remains intact  Description: 1.  Monitor for areas of redness and/or skin breakdown2.  Assess vascular access sites hourly3.  Every 4-6 hours minimum:  Change oxygen saturation probe site4.

## 2025-05-16 NOTE — PROGRESS NOTES
Date: 5/16/2025    Time: 09:38 am    Patient Placed On BIPAP/CPAP/ Non-Invasive Ventilation?  No    If no must comment.on from previous shift  Facial area red/color change? No           If YES are Blister/Lesion present?No   If yes must notify nursing staff  BIPAP/CPAP skin barrier?  Yes    Skin barrier type:mepilexlite       Comments: Heater added at this time per MD Rajwinder Garcia, P     05/16/25 0987   NIV Type   Equipment Changed Humidification   NIV Started/Stopped On   Mode Bilevel   Mask Type Full face mask   Mask Size Medium   Assessment   Pulse 64   Respirations 24   SpO2 99 %   Level of Consciousness 0   Comfort Level Good   Using Accessory Muscles No   Mask Compliance Good   Skin Protection for O2 Device Yes   Location Nose   Settings/Measurements   IPAP 14 cmH20   CPAP/EPAP 5 cmH2O   Vt (Measured) 680 mL   Rate Ordered 16   Insp Rise Time (%) 3 %   FiO2  60 %   I Time/ I Time % 0.8 s   Minute Volume (L/min) 16.6 Liters   Mask Leak (lpm) 47 lpm   Alarm Settings   Alarms On Y   Low Pressure (cmH2O) 8 cmH2O   Apnea (secs) 20 secs   RR Low (bpm) 16   RR High (bpm) 35 br/min

## 2025-05-16 NOTE — PROGRESS NOTES
Pharmacy Consultation Note  (Antibiotic Dosing and Monitoring)    Initial consult date: 5/16/25  Consulting physician/provider: Chelsey  Drug: Vancomycin  Indication: Pneumonia (CAP/Nosocomial)    Age/  Gender Height Weight IBW  Allergy Information   69 y.o./male 170.2 cm (5' 7\") 99.5 kg (219 lb 5.7 oz)     Ideal body weight: 66.1 kg (145 lb 11.6 oz)  Adjusted ideal body weight: 79.5 kg (175 lb 2.8 oz)   Patient has no known allergies.      Renal Function:  Recent Labs     05/15/25  0606 05/15/25  2111 05/16/25  0405   * 145* 149*   CREATININE 4.2* 3.9* 4.3*       Intake/Output Summary (Last 24 hours) at 5/16/2025 0619  Last data filed at 5/16/2025 0600  Gross per 24 hour   Intake 520 ml   Output 710 ml   Net -190 ml       Vancomycin Monitoring:  Trough:  No results for input(s): \"VANCOTROUGH\" in the last 72 hours.  Random:  No results for input(s): \"VANCORANDOM\" in the last 72 hours.    Vancomycin Administration Times:  Recent vancomycin administrations        No vancomycin IV orders with administrations found.            Orders not given:            vancomycin (VANCOCIN) 2500 mg in sodium chloride 0.9 % 500 mL IVPB                    Assessment:  Patient is a 69 y.o. male who has been initiated on vancomycin  Estimated Creatinine Clearance: 18 mL/min (A) (based on SCr of 4.3 mg/dL (H)).  To dose vancomycin, pharmacy will be utilizing dosing based off of levels because of patient's renal impairment/insufficiency    Plan:  Random tomorrow AM    Esther De Los Santos, PharmD, BCPS, BCCCP 5/16/2025 8:30 AM   SWEETIE: [phone]  SEY: [phone]  SJW: [phone]

## 2025-05-16 NOTE — PROGRESS NOTES
PROGRESS NOTE     CARDIOLOGY    Chief complaint: Seen today for follow up, management & recommendations for heart failure with improved ejection fraction.    He was seen earlier this morning.  He was on BiPAP.  He was in no distress.  He denies any chest pain.    Wt Readings from Last 3 Encounters:   05/15/25 99.5 kg (219 lb 5.7 oz)   05/14/25 96.6 kg (213 lb)   05/09/25 95.8 kg (211 lb 1.6 oz)     Temp Readings from Last 3 Encounters:   05/16/25 97.2 °F (36.2 °C)   05/09/25 97.1 °F (36.2 °C) (Temporal)   04/17/25 97.9 °F (36.6 °C) (Temporal)     BP Readings from Last 3 Encounters:   05/16/25 (!) 106/49   05/14/25 (!) 90/54   05/09/25 (!) 107/54     Pulse Readings from Last 3 Encounters:   05/16/25 61   05/14/25 84   05/09/25 59         Intake/Output Summary (Last 24 hours) at 5/16/2025 1332  Last data filed at 5/16/2025 1200  Gross per 24 hour   Intake 784.85 ml   Output 400 ml   Net 384.85 ml       Recent Labs     05/15/25  0606 05/15/25  0927 05/15/25  2111 05/16/25  0405 05/16/25  1135   WBC 10.3  --  22.5* 28.1*  --    HGB 7.5*   < > 7.3* 6.3* 7.6*   HCT 24.1*   < > 23.2* 20.1* 23.4*   MCV 97.2  --  97.5 96.2  --      --  194 148  --     < > = values in this interval not displayed.     Recent Labs     05/15/25  0606 05/15/25  2111 05/16/25  0405   * 134* 135*   K 5.0 5.0 5.2*   CL 92* 93* 94*   CO2 24 19* 21*   PHOS 9.3* 9.3* 8.7*   * 145* 149*   CREATININE 4.2* 3.9* 4.3*   MG 2.2 2.1 2.1     Recent Labs     05/16/25  0405   PROTIME 22.3*   INR 2.0     Recent Labs     05/15/25  0606   CKTOTAL 29     No results for input(s): \"BNP\" in the last 72 hours.  No results for input(s): \"CHOL\", \"HDL\", \"TRIG\" in the last 72 hours.    Invalid input(s): \"CHOLHDLR\", \"LDLCALCU\"  Recent Labs     05/14/25  1651 05/15/25  2111 05/16/25  0405   TROPHS 61* 78* 91*         0.9 % sodium chloride infusion, PRN  norepinephrine (LEVOPHED) 16 mg in sodium chloride 0.9 % 250 mL infusion (premix),  medications when blood pressure allows.  No further cardiac recommendations or testing.  Cardiology will sign off    Note: This report was completed using computerized voice recognition software. Every effort has been made to ensure accuracy, however; and invert and computerized transcription errors may be present.

## 2025-05-16 NOTE — PROGRESS NOTES
Patient admitted to MICU with the following belongings: Other candy and two ace wrap bandages  . The following belongings were sent home with the patient's family:  None - belongings noted on admission remain in patient's room..

## 2025-05-16 NOTE — PROGRESS NOTES
Department of Internal Medicine  Nephrology Attending Consult Note      Events reviewed.    SUBJECTIVE: We are following Mr. Wander Rocha for CKD.  Patient on BiPAP, reports shortness of breath.    PHYSICAL EXAM:      Vitals:    VITALS:  BP (!) 98/40   Pulse 61   Temp 97.3 °F (36.3 °C) (Bladder)   Resp 25   Ht 1.702 m (5' 7\")   Wt 99.5 kg (219 lb 5.7 oz)   SpO2 99%   BMI 34.36 kg/m²   24HR INTAKE/OUTPUT:    Intake/Output Summary (Last 24 hours) at 5/16/2025 1000  Last data filed at 5/16/2025 0650  Gross per 24 hour   Intake 624.85 ml   Output 420 ml   Net 204.85 ml       Constitutional:  Awake, alert, oriented, in NAD  HEENT:  PERRLA, normocephalic, atraumatic  Respiratory:  wheezes  Cardiovascular/Edema:  RRR, normal S1, normal S2, + edema  Gastrointestinal: Distended  Neurologic:  Nonfocal  Skin:  warm, dry, no rashes, no lesions    Scheduled Meds:   piperacillin-tazobactam  4,500 mg IntraVENous Q12H    heparin (porcine)        ipratropium 0.5 mg-albuterol 2.5 mg  1 Dose Inhalation Q4H WA RT    sevelamer  0.8 g Oral TID WC    sodium chloride  1 spray Each Nostril TID    midodrine  10 mg Oral TID WC    [Held by provider] metoprolol succinate  12.5 mg Oral Daily    epoetin lay-epbx  3,000 Units SubCUTAneous Once per day on Tuesday Thursday Saturday    [Held by provider] ferric gluconate (FERRLECIT) 125 mg in sodium chloride 0.9 % 100 mL IVPB  125 mg IntraVENous Daily    sodium chloride flush  5-40 mL IntraVENous 2 times per day    [Held by provider] acetaZOLAMIDE  250 mg Oral Daily    [Held by provider] allopurinol  200 mg Oral Daily    [Held by provider] apixaban  5 mg Oral BID    [Held by provider] aspirin  81 mg Oral Daily    atorvastatin  40 mg Oral Daily    [Held by provider] bumetanide  2 mg Oral BID    vitamin D  2,000 Units Oral Daily    [Held by provider] empagliflozin  10 mg Oral Daily    levothyroxine  100 mcg Oral Daily    levothyroxine  50 mcg Oral Daily    [Held by provider] lisinopril  5 mg

## 2025-05-16 NOTE — PROGRESS NOTES
IR RN attempted to call patients wife, Petra to obtain consent for tunneled HD, no answer HIPAA compliant voicemail left.     Per bedside RN, patient is getting temp line placed. Will tentatively place tunneled hd next week.

## 2025-05-16 NOTE — PROCEDURES
[facility]    Pulmonary/critical care procedure note    Hemodialysis CENTRAL LINE PLACEMENT NOTE    Patient: Wander Rocha  MRN: [id number]  : 1955  Date: 2025  Time: 3:24 PM    Laboratory Work:  Lab Results   Component Value Date    INR 2.0 2025    INR 2.6 2024    INR 2.1 2022    PROTIME 22.3 (H) 2025    PROTIME 28.6 (H) 2024    PROTIME 22.9 (H) 2022     Lab Results   Component Value Date    WBC 28.1 (H) 2025    HGB 7.6 (L) 2025    HCT 23.4 (L) 2025    MCV 96.2 2025     2025    LYMPHOPCT 4 (L) 2025    RBC 2.09 (L) 2025    MCH 30.1 2025    MCHC 31.3 (L) 2025    RDW 16.8 (H) 2025    NEUTOPHILPCT 90 (H) 2025    MONOPCT 6 2025    EOSPCT 0 2025    BASOPCT 0 2025    NEUTROABS 25.17 (H) 2025    LYMPHSABS 1.22 (L) 2025    MONOSABS 1.71 (H) 2025    EOSABS 0.00 (L) 2025    BASOSABS 0.00 2025     Lab Results   Component Value Date/Time     2025 04:05 AM    K 5.2 2025 04:05 AM    K 5.1 2023 05:35 AM    CL 94 2025 04:05 AM    CO2 21 2025 04:05 AM     2025 04:05 AM    CREATININE 4.3 2025 04:05 AM    GLUCOSE 77 2025 04:05 AM    GLUCOSE 133 2012 08:43 AM    CALCIUM 8.3 2025 04:05 AM      Attending Physician: Dr. Serrano    Assistant(s): MICU Staff    Pre-Procedure Diagnosis: Acute Kidney Injury     Operation/Procedure: 24 cm Triple Lumen Hemodialysis Venous Catheter Placement to Right Femoral Vein Under Sterile US Guidance    Post-Procedure Diagnosis: Acute Kidney Injury     Indications: IV access needed for Hemodialysis    Purpose: IV access    Consent: Consent was obtained prior to procedure. Indications, risks, benefits were explained at length.    Procedure Summary:  A time out was performed to confirm patient and procedure.  A hat, mask as well as sterile gown and gloves  were worn during the procedure.  Patient in supine position with right leg slightly abducted  The right groin was prepped and draped in sterile fashion using chlorhexidine scrub.  Sterile ultrasound guidance was used to locate the large compressible left/right femoral vein]    Anesthesia was achieved with 3 cc of 1% lidocaine.  Venous blood was withdrawn.  Syringe plunger was removed and a guide wire was advanced into the introducer needle.  A small incision was made at the skin surface with a #10 blade and the introducer needle was exchanged for a dilator over the guide wire.  After appropriate dilation was obtained, the dilator was exchanged over the wire for a 24 cm triple lumen hemodialysis catheter.  The guidewire was removed via the central purple port and the catheter was sutured in place.    All ports were capped and flushed.  The patient tolerated the procedure without hemodynamic compromise or dysrhythmia.  At time of procedure completion, all ports aspirated and flushed properly.    Estimated Blood Loss: 5 cc    Complications: NONE    Tolerance: Excellent    Department of Internal Medicine  Division of Pulmonary, Critical Care and Sleep Medicine  [facility]  Procedural Note Addendum Statement    I personally supervised/performed the performance of the documented procedure. I was present for the critical elements of this procedure and was immediately available for the entire procedure.    Teo Serrano MD on 5/16/2025 at 3:24 PM

## 2025-05-16 NOTE — PROGRESS NOTES
[facility]  Internal Medicine Residency Program  Progress Note - House Team 2    Patient:  Wander Rocha 69 y.o. male MRN: [id number]     Date of Service: 5/17/2025     CC: shortness of breath    Days since admission: 3    Subjective     Overnight events:   Hgb stable  Dilaudid PRN for pain    He was seen and examined at bedside this morning. He had no acute concerns today. He is on BIPAP at the time of my examination. No recurrence of nose bleeding. Pressor requirement increasing.     Objective     VS: BP (!) 124/43   Pulse 81   Temp 99 °F (37.2 °C) (Bladder)   Resp 19   Ht 1.702 m (5' 7\")   Wt 99.2 kg (218 lb 11.1 oz)   SpO2 97%   BMI 34.25 kg/m²   ABP (Arterial line BP): 111/41  ABP Mean (Arterial Line Mean): (!) 63 mmHg  I & O - 24hr:   Intake/Output Summary (Last 24 hours) at 5/17/2025 0641  Last data filed at 5/17/2025 0550  Gross per 24 hour   Intake 3432.92 ml   Output 1935 ml   Net 1497.92 ml     Net IO Since Admission: 791.63 mL [05/17/25 0641]    Physical Exam:  General Appearance: in mild distress, on BIPAP  HEENT: Normocephalic, atraumatic  Neck: midline trachea  Lung: clear to auscultation bilaterally  Heart: regular rate and rhythm, no murmur  Abdomen: soft, bowel sounds normal; no masses  Extremities: no cyanosis or edema (+) arterial line, R brachial  Musculokeletal: No joint swelling  Neurologic: Mental status: awake, alert    Medications     Continuous Infusions:   sodium chloride      norepinephrine 27 mcg/min (05/17/25 0550)    sodium chloride      dextrose      sodium chloride       Scheduled Meds:   piperacillin-tazobactam  4,500 mg IntraVENous Q12H    ipratropium 0.5 mg-albuterol 2.5 mg  1 Dose Inhalation Q4H WA RT    sevelamer  0.8 g Oral TID WC    sodium chloride  1 spray Each Nostril TID    midodrine  10 mg Oral TID WC    [Held by provider] metoprolol succinate  12.5 mg Oral Daily    epoetin lay-epbx  3,000 Units SubCUTAneous Once per day on Tuesday Thursday

## 2025-05-16 NOTE — PROGRESS NOTES
Antibiotic Extended Infusion Policy     This patient is on medication that requires renal, weight, and/or indication dose adjustment.      Date Body Weight IBW  Adjusted BW SCr  CrCl Dialysis status BMI   5/16/2025 99.5 kg (219 lb 5.7 oz) Ideal body weight: 66.1 kg (145 lb 11.6 oz)  Adjusted ideal body weight: 79.5 kg (175 lb 2.8 oz) Serum creatinine: 4.3 mg/dL (H) 05/16/25 0405  Estimated creatinine clearance: 18 mL/min (A) N/a Body mass index is 34.36 kg/m².       Pharmacy has dose-adjusted the following medication(s):    Ordered Medication: Zosyn 3375mg q8H     Order Changed/converted to: Zosyn 4500mg q12h    These changes were made per protocol according to the [facility]   Automatic Extended Infusion Dose Adjustment Policy.     *Please note this dose may need readjusted if patient's condition changes.    Please contact pharmacy with any questions regarding these changes.    Sea Cedeno RPH  5/16/2025  6:08 AM

## 2025-05-16 NOTE — PROGRESS NOTES
Cardiomegaly with pulmonary venous congestion.      Small to moderate right and small left pleural effusions with associated   bibasilar airspace.         XR CHEST PORTABLE   Final Result   No significant interval change. Multifocal bilateral infiltrates with   significant size right and small left pleural effusion.         Vascular duplex upper extremity venous left   Final Result   1.  No sonographic evidence of DVT in the left upper extremity.         US RETROPERITONEAL COMPLETE   Final Result   1. Mildly increased echogenicity of the renal cortices most suggestive of   intrinsic renal disease. No hydronephrosis.   2. Splenomegaly.   3. Perihepatic ascites.   4. Right pleural effusion.         XR CHEST PORTABLE   Final Result   1. Large right and small left pleural effusions with bilateral lower lobe opacities and increased perihilar interstitial markings, compatible with volume overload.   2. Enlarged cardiac silhouette with left-sided dual chamber pacer.            XR CHEST PORTABLE    (Results Pending)   IR TUNNELED CVC PLACE WO SQ PORT/PUMP > 5 YEARS    (Results Pending)   CT ABDOMEN PELVIS WO CONTRAST Additional Contrast? None    (Results Pending)       Physical Examination:  Vitals:   Vitals:    05/16/25 0700 05/16/25 0800 05/16/25 0900 05/16/25 0915   BP: (!) 91/36 (!) 107/52 (!) 101/44 (!) 109/48   Pulse: 61 61 60 62   Resp: 18 21 16 23   Temp:  97.7 °F (36.5 °C)  97.2 °F (36.2 °C)   TempSrc:  Bladder  Bladder   SpO2: 99% 99% 99% 98%   Weight:       Height:          General: No Acute Distress  HEENT: normocephalic, atraumatic  Abdomen: soft, NT, ND  CVS: RRR, S1, S2, No S3 or S4  Respiratory: decreased breath sounds at lung bases, no focal wheezes noted  Extremities: no clubbing/cyanosis/edema  Neuro: wake, alert     ASSESSMENT:  Acute respiratory failure likely 2/2 pulmonary edema, bilateral pleural effusion - not on O2 at baseline  Acute decompensated HF - proBNP 3981 > 3635  HFimpEF s/p CRT-P  (4/23/25)   EF 50-55% on 4/29/25, previously 45% on 2/10/25  Persistent atrial fibrillation, [id number] 4, on Eliquis   Sinus node disorder s/p dual chamber pacemaker insertion (4/23/25) s/p upgrade to CRT-P (4/23/25)   HTN  HLD  Elevated troponin likely 2/2 demand ischemia, RADHA - downtrending  Hx of NIKO, noncompliant with CPAP  T2DM, A1c 8.9  Hypothyroidism, on Synthroid  RADHA stage II on CKD 3b (Baseline Cr 2) likely multifactorial, in setting of acute decompensated HF, ACEi  CKD 3b 2/2 nephrosclerosis   Previously with episodes of ischemic ATN requiring temporary HD   HAGMA likely 2/2 uremia, RADHA  Hypotonic hypervolemic hyponatremia likely 2/2 acute decompensated HF   Recurrent epistaxis in setting of chronic O2 use and anticoagulation s/p L anterior septum chemical cauterization of prominent vessel     In addition the following applies:    Check: serial labs   Medication Alterations: hold aspirin   Procedures: HD Line Placement, Arterial Line Placement   Imaging: reviewed   New Consultations: N/A  VENT: N/A  - BIPAP 14/5, 16, 50%  Ppeak:  Pplateau:  Compliance:    Access: Peripheral, CRT-P   Consults: Nephrology, ENT, Cardiology  Drips: Levophed   DVT Prophylaxis: SCDs  GI Prophylaxis: PPI  Nutrition: NPO  ABX: Zosyn   Completed ABX:  Blood Products:   - PRBC: 1 unit PRBC  - FFP:  - SDP:  - Cryoprecipitate:     ICU Level of Care Warranted:  VENT Management: NIV  IV Drip Management: Levophed   Acute HD requirement, concern for decompensation of airway     Thank you for allowing me to participate in the care of this patient.    Care reviewed with nursing staff, medical and surgical specialty care, primary care and the patient's family as available. Restraints are ordered when the patient can do harm to him/herself by pulling out devices.    Critical Care Time: 35 minutes excluding procedures    Teo Serrano M.D.    Teo Serrano MD  5/16/2025  9:36 AM

## 2025-05-16 NOTE — CONSULTS
, DO        epoetin lay-epbx (RETACRIT) injection 3,000 Units  3,000 Units SubCUTAneous Once per day on Tuesday Thursday Saturday Manoj Chavez MD   3,000 Units at 05/15/25 2223    [Held by provider] ferric gluconate (FERRLECIT) 125 mg in sodium chloride 0.9 % 100 mL IVPB  125 mg IntraVENous Daily Manoj Chavez MD   Stopped at 05/15/25 2253    bumetanide (BUMEX) 12.5 mg in sodium chloride 0.9 % 125 mL infusion  1 mg/hr IntraVENous Continuous Lizzy Barbosa MD   Stopped at 05/16/25 0411    sodium chloride flush 0.9 % injection 5-40 mL  5-40 mL IntraVENous 2 times per day Lizzy Barbosa MD   10 mL at 05/15/25 2156    sodium chloride flush 0.9 % injection 5-40 mL  5-40 mL IntraVENous PRN Lizzy Barbosa MD        0.9 % sodium chloride infusion   IntraVENous PRN Lizzy Barbosa MD        glucose chewable tablet 16 g  4 tablet Oral PRN Lizzy Barbosa MD        dextrose bolus 10% 125 mL  125 mL IntraVENous PRN Lizzy Barbosa MD        Or    dextrose bolus 10% 250 mL  250 mL IntraVENous PRN Lizzy Barbosa MD        glucagon injection 1 mg  1 mg SubCUTAneous PRN Lizzy Barbosa MD        dextrose 10 % infusion   IntraVENous Continuous PRN Lizzy Barbosa MD        sodium chloride flush 0.9 % injection 5-40 mL  5-40 mL IntraVENous 2 times per day Mike Alejandro MD   10 mL at 05/15/25 2156    sodium chloride flush 0.9 % injection 5-40 mL  5-40 mL IntraVENous PRN Mike Alejandro MD        0.9 % sodium chloride infusion   IntraVENous PRN Mike Alejandro MD        ondansetron (ZOFRAN-ODT) disintegrating tablet 4 mg  4 mg Oral Q8H PRN Mike Alejandro MD        Or    ondansetron (ZOFRAN) injection 4 mg  4 mg IntraVENous Q6H PRN Mike Alejandro MD   4 mg at 05/15/25 1901    polyethylene glycol (GLYCOLAX) packet 17 g  17 g Oral Daily PRN Mike Alejandro MD        acetaminophen (TYLENOL) tablet 650 mg  650 mg Oral Q6H PRN Mike Alejandro MD   650 mg at 05/14/25 7411

## 2025-05-16 NOTE — PROGRESS NOTES
[facility]   Department of Internal Medicine   Internal Medicine Residency  MICU Progress Note    Patient:  Wander Rocha 69 y.o. male   MRN: [id number]      Room: Pending sale to [facility]/Pending sale to [facility]A    Admission date: 5/14/2025  2:21 PM ; Hospital day: 2   ICU day: 10h      Allergy: Patient has no known allergies.    Subjective     Patient was seen and examined this morning at bedside. Episode of epistaxis, underwent cauterization by ENT and dissolvable nasal packing.     Patient started becoming hypotensive and requiring vasopressor support.    Objective       I & O - 24hr:    Intake/Output Summary (Last 24 hours) at 5/16/2025 0629  Last data filed at 5/16/2025 0600  Gross per 24 hour   Intake 530 ml   Output 710 ml   Net -180 ml     Net IO Since Admission: -686.29 mL [05/16/25 0629]    Physical Exam  BP (!) 94/39   Pulse 60   Temp 98.4 °F (36.9 °C) (Bladder)   Resp 19   Ht 1.702 m (5' 7\")   Wt 99.5 kg (219 lb 5.7 oz)   SpO2 100%   BMI 34.36 kg/m²   Ideal body weight: 66.1 kg (145 lb 11.6 oz)    General Appearance: alert, appears stated age, cooperative, delirious, and mild distress  HEENT:  dissolvable nasal packing in the left nare, dry blood  Lung: diminished breath sounds bilaterally  Heart: S1, S2 normal and sinus tachycardia  Abdomen:  distended, generalized tenderness but worst in RUQ, no guarding/rigidity  Extremities:   trace pedal edema, anterior leg erythema and tenderness  Neurologic: confused  Red urine in vallecillo tube      Medications     Continuous Infusions:   sodium chloride      norepinephrine 5 mcg/min (05/16/25 0537)    bumetanide (BUMEX) 12.5 mg in sodium chloride 0.9 % 125 mL infusion Stopped (05/16/25 0411)    sodium chloride      dextrose      sodium chloride       Scheduled Meds:   piperacillin-tazobactam  4,500 mg IntraVENous Once    Followed by    piperacillin-tazobactam  4,500 mg IntraVENous Q12H    vancomycin  2,500 mg IntraVENous Once    ipratropium 0.5 mg-albuterol 2.5 mg  1 Dose

## 2025-05-16 NOTE — PROCEDURES
[facility]    Pulmonary/critical care procedure note    ARTERIAL LINE PROCEDURE NOTE    Patient: Wander Rocha  MRN: [id number]  : 1955  Date: 2025  Time: 1:00 PM    Laboratory Work:  Lab Results   Component Value Date    INR 2.0 2025    INR 2.6 2024    INR 2.1 2022    PROTIME 22.3 (H) 2025    PROTIME 28.6 (H) 2024    PROTIME 22.9 (H) 2022     Lab Results   Component Value Date    WBC 28.1 (H) 2025    HGB 7.6 (L) 2025    HCT 23.4 (L) 2025    MCV 96.2 2025     2025    LYMPHOPCT 4 (L) 2025    RBC 2.09 (L) 2025    MCH 30.1 2025    MCHC 31.3 (L) 2025    RDW 16.8 (H) 2025    NEUTOPHILPCT 90 (H) 2025    MONOPCT 6 2025    EOSPCT 0 2025    BASOPCT 0 2025    NEUTROABS 25.17 (H) 2025    LYMPHSABS 1.22 (L) 2025    MONOSABS 1.71 (H) 2025    EOSABS 0.00 (L) 2025    BASOSABS 0.00 2025     Lab Results   Component Value Date/Time     2025 04:05 AM    K 5.2 2025 04:05 AM    K 5.1 2023 05:35 AM    CL 94 2025 04:05 AM    CO2 21 2025 04:05 AM     2025 04:05 AM    CREATININE 4.3 2025 04:05 AM    GLUCOSE 77 2025 04:05 AM    GLUCOSE 133 2012 08:43 AM    CALCIUM 8.3 2025 04:05 AM      Attending Physician: Dr. Serrano    Assistant(s): MICU Staff    Pre-Procedure Diagnosis: Hypotension, Shock    Operation/Procedure: Arterial Line Placement to Right Brachial Artery Under Sterile US Guidance    Post-Procedure Diagnosis: Hypotension, Shock    Indications: Appropriate Blood Pressure Monitoring     Purpose: Appropriate Blood Pressure Monitoring    Consent: Consent was obtained prior to procedure. Indications, risks, benefits were explained at length.    Procedure Summary:  A time out was performed to confirm patient and procedure.   A hat, mask as well as sterile gown and gloves were worn during

## 2025-05-16 NOTE — PROGRESS NOTES
OTOLARYNGOLOGY  DAILY PROGRESS NOTE  2025    Subjective:   Patient transferred to ICU overnight and placed on nonhumidified BIPAP. Evidence of epistaxis overnight with right sided obstructive crusting and dried blood in posterior oropharynx. No active bleeding.     Objective:/     BP (!) 91/36   Pulse 61   Temp 98.4 °F (36.9 °C) (Bladder)   Resp 18   Ht 1.702 m (5' 7\")   Wt 99.5 kg (219 lb 5.7 oz)   SpO2 99%   BMI 34.36 kg/m²   PULSE OXIMETRY RANGE: SpO2  Av.3 %  Min: 76 %  Max: 100 %  I/O last 3 completed shifts:  In: 718.6 [P.O.:480; I.V.:6; Other:40; IV Piggyback:192.6]  Out: 1320 [Urine:1320]    General Appearance:  Laying in bed, awake, alert, no apparent distress  Head/face:  NC/AT  Eyes: PERRL, EOMI  ENT: Bilateral external ears WNL, Nares patent, Septum midline,no active bleeding seen anterior. Dry oral mucosa, left anterior dissolvable packing in place, right nose obstructed with dried crusting, posterior oropharynx with dried blood  Neck:Supple, no adenopathy  Lungs:  Non-labored, good respiratory effort, no stridor.  BIPAP  Heart:  RR  Neuro: Facial nerve symmetric and intact. House Brackmann 1/6, bilaterally.     CBC  Recent Labs     05/15/25  0606 05/15/25  0927 05/15/25  1802 05/15/25  2111 05/16/25  0405   WBC 10.3  --   --  22.5* 28.1*   HGB 7.5*   < > 7.8* 7.3* 6.3*   HCT 24.1*   < > 24.3* 23.2* 20.1*     --   --  194 148    < > = values in this interval not displayed.     BMP  Recent Labs     25   *   K 5.2*   CL 94*   CO2 21*   *   CREATININE 4.3*   CALCIUM 8.3*     PT-INR  Recent Labs     25   INR 2.0     CALCIUM  Recent Labs     05/15/25  0606 05/15/25  2111 05/16/25  040   CALCIUM 8.7* 8.8 8.3*       Assessment/Plan:     69 y.o. male male with recurrent epistaxis in the setting of chronic oxygen use on Eliquis and aspirin without current active bleeding     Patient's home oxygen is humidified however has not been on humidified oxygen  at hospital.  Strongly recommend keeping patient on humidified oxygen to prevent epistaxis in the setting of multiple anticoagulants.  As dissolvable packing is placed in left nare patient may require oxygen via facemask instead of nasal cannula that he is currently on.  Discussed with nursing and they are aware of this. Discussed importance of starting patient of humidification on the BIPAP immediately to prevent recurrent bleeding.   S/P left anterior septum chemical cauterization of prominent vessel.  Dissolvable packing placed. Keep packing in place. This will not need to be removed.   Hold blood thinners if possible for 1-2 weeks. Defer decision to primary medicine team.   Minimal oozing is to be expected over next several days as packing dissolves. Please notify ENT resident of any profuse bleeding anteriorly or coughing up bright red blood clots.   Nursing can spray afrin liberally in both nares and hold pressure for any persistent bleeding.   Start nasal saline spray TID to both nares  Maintain HOB elevated  No nose blowing or straining  Recommend bactroban to anterior nose bilaterally  Sneeze with mouth open  Tight blood pressure control  Transfuse as per protocol (Hgb<7)  Keep outpatient follow up appointment with ENT     Electronically signed by Dona Vega DO on 5/16/2025 at 8:37 AM

## 2025-05-16 NOTE — FLOWSHEET NOTE
05/16/25 1907   Vital Signs   BP (!) 134/40   Temp 98.6 °F (37 °C)   Pulse 70   Respirations 26   Weight - Scale 99.7 kg (219 lb 12.8 oz)   Weight Method Bed scale   Percent Weight Change 0.2   Post-Hemodialysis Assessment   Post-Treatment Procedures Blood returned;Catheter capped, clamped with Citrate x 2 ports   Machine Disinfection Process Acid/Vinegar Clean;Heat Disinfect;Exterior Machine Disinfection   Rinseback Volume (ml) 400 ml   Blood Volume Processed (Liters) 46.9 L   Dialyzer Clearance Moderately streaked   Duration of Treatment (minutes) 240 minutes   Heparin Amount Administered During Treatment (mL) 0 mL   Hemodialysis Intake (ml) 1000 ml   Hemodialysis Output (ml) 1000 ml   NET Removed (ml) 0   Tolerated Treatment Fair   Interventions Taken Ultrafiltration stopped;Ultrafiltration goal decreased   Patient Response to Treatment HD treatment completed as ordered, patient tolerated HD treatment w/o issues, was unable to remove any fluid d/t lower bps. +1000mLS with flushes added to prevent system from clotting.  Blood returned to patient per [facility] policy, catheter closed with sodium citrate, catheter clamps and caps secure x2, patient is stable, remains in ICU, post HD report given to KANDI Lake.   Bilateral Breath Sounds Diminished   Edema Right lower extremity;Left lower extremity   RLE Edema +1   LLE Edema +1   Physician Notified No   Time Off 1852   Patient Disposition Remain in ICU/ED   Observations & Evaluations   Level of Consciousness 0   Heart Rhythm Irregular   O2 Device PAP (positive airway pressure)   Skin Color Pink   Skin Condition/Temp Dry;Warm;Swollen/edematous   Abdomen Inspection Rounded;Distended   Bowel Sounds (All Quadrants) Active;Present   Handoff   Communication Given Transfer Handoff   Handoff Given To Aleksandra CHAU   Handoff Received From Rubina CHAU   Handoff Communication Face to Face   Time Handoff Given 1910   End of Shift Check Performed N/A

## 2025-05-17 ENCOUNTER — APPOINTMENT (OUTPATIENT)
Dept: GENERAL RADIOLOGY | Age: 70
DRG: 871 | End: 2025-05-17
Payer: MEDICARE

## 2025-05-17 LAB
ABO/RH: NORMAL
ALBUMIN SERPL-MCNC: 3.4 G/DL (ref 3.5–5.2)
ALP SERPL-CCNC: 95 U/L (ref 40–129)
ALT SERPL-CCNC: 25 U/L (ref 0–50)
AMMONIA PLAS-SCNC: 40 UMOL/L (ref 16–60)
ANION GAP SERPL CALCULATED.3IONS-SCNC: 20 MMOL/L (ref 7–16)
ANTIBODY SCREEN: NEGATIVE
ARM BAND NUMBER: NORMAL
AST SERPL-CCNC: 29 U/L (ref 0–50)
BASOPHILS # BLD: 0 K/UL (ref 0–0.2)
BASOPHILS NFR BLD: 0 % (ref 0–2)
BILIRUB SERPL-MCNC: 1 MG/DL (ref 0–1.2)
BLOOD BANK BLOOD PRODUCT EXPIRATION DATE: NORMAL
BLOOD BANK DISPENSE STATUS: NORMAL
BLOOD BANK ISBT PRODUCT BLOOD TYPE: 6200
BLOOD BANK PRODUCT CODE: NORMAL
BLOOD BANK SAMPLE EXPIRATION: NORMAL
BLOOD BANK UNIT TYPE AND RH: NORMAL
BPU ID: NORMAL
BUN SERPL-MCNC: 87 MG/DL (ref 8–23)
CALCIUM SERPL-MCNC: 8 MG/DL (ref 8.8–10.2)
CHLORIDE SERPL-SCNC: 98 MMOL/L (ref 98–107)
CO2 SERPL-SCNC: 20 MMOL/L (ref 22–29)
COMPONENT: NORMAL
CREAT SERPL-MCNC: 2.8 MG/DL (ref 0.7–1.2)
CROSSMATCH RESULT: NORMAL
CRP SERPL HS-MCNC: 222 MG/L (ref 0–5)
DATE LAST DOSE: NORMAL
EOSINOPHIL # BLD: 0.69 K/UL (ref 0.05–0.5)
EOSINOPHILS RELATIVE PERCENT: 3 % (ref 0–6)
ERYTHROCYTE [DISTWIDTH] IN BLOOD BY AUTOMATED COUNT: 18 % (ref 11.5–15)
ERYTHROCYTE [SEDIMENTATION RATE] IN BLOOD BY WESTERGREN METHOD: 48 MM/HR (ref 0–15)
GFR, ESTIMATED: 24 ML/MIN/1.73M2
GLUCOSE BLD-MCNC: 164 MG/DL (ref 74–99)
GLUCOSE BLD-MCNC: 168 MG/DL (ref 74–99)
GLUCOSE SERPL-MCNC: 153 MG/DL (ref 74–99)
HCT VFR BLD AUTO: 26.5 % (ref 37–54)
HCT VFR BLD AUTO: 27.1 % (ref 37–54)
HGB BLD-MCNC: 8.5 G/DL (ref 12.5–16.5)
HGB BLD-MCNC: 8.6 G/DL (ref 12.5–16.5)
LACTATE BLDV-SCNC: 1.5 MMOL/L (ref 0.5–2.2)
LYMPHOCYTES NFR BLD: 1.16 K/UL (ref 1.5–4)
LYMPHOCYTES RELATIVE PERCENT: 4 % (ref 20–42)
MAGNESIUM SERPL-MCNC: 2.1 MG/DL (ref 1.6–2.4)
MCH RBC QN AUTO: 30.3 PG (ref 26–35)
MCHC RBC AUTO-ENTMCNC: 32.5 G/DL (ref 32–34.5)
MCV RBC AUTO: 93.3 FL (ref 80–99.9)
MONOCYTES NFR BLD: 1.39 K/UL (ref 0.1–0.95)
MONOCYTES NFR BLD: 5 % (ref 2–12)
NEUTROPHILS NFR BLD: 88 % (ref 43–80)
NEUTS SEG NFR BLD: 23.16 K/UL (ref 1.8–7.3)
PHOSPHATE SERPL-MCNC: 5.9 MG/DL (ref 2.5–4.5)
PLATELET # BLD AUTO: 200 K/UL (ref 130–450)
PMV BLD AUTO: 10.2 FL (ref 7–12)
POTASSIUM SERPL-SCNC: 4.8 MMOL/L (ref 3.5–5.1)
PROT SERPL-MCNC: 6.8 G/DL (ref 6.4–8.3)
RBC # BLD AUTO: 2.84 M/UL (ref 3.8–5.8)
RBC # BLD: ABNORMAL 10*6/UL
SODIUM SERPL-SCNC: 137 MMOL/L (ref 136–145)
TME LAST DOSE: NORMAL H
TRANSFUSION STATUS: NORMAL
UNIT DIVISION: 0
UNIT ISSUE DATE/TIME: NORMAL
VANCOMYCIN DOSE: NORMAL MG
VANCOMYCIN SERPL-MCNC: 33 UG/ML (ref 5–40)
WBC OTHER # BLD: 26.4 K/UL (ref 4.5–11.5)

## 2025-05-17 PROCEDURE — 36415 COLL VENOUS BLD VENIPUNCTURE: CPT

## 2025-05-17 PROCEDURE — 94640 AIRWAY INHALATION TREATMENT: CPT

## 2025-05-17 PROCEDURE — 85025 COMPLETE CBC W/AUTO DIFF WBC: CPT

## 2025-05-17 PROCEDURE — 6360000002 HC RX W HCPCS

## 2025-05-17 PROCEDURE — 6370000000 HC RX 637 (ALT 250 FOR IP): Performed by: INTERNAL MEDICINE

## 2025-05-17 PROCEDURE — 82962 GLUCOSE BLOOD TEST: CPT

## 2025-05-17 PROCEDURE — 6370000000 HC RX 637 (ALT 250 FOR IP)

## 2025-05-17 PROCEDURE — 2500000003 HC RX 250 WO HCPCS: Performed by: INTERNAL MEDICINE

## 2025-05-17 PROCEDURE — 2700000000 HC OXYGEN THERAPY PER DAY

## 2025-05-17 PROCEDURE — 87081 CULTURE SCREEN ONLY: CPT

## 2025-05-17 PROCEDURE — 83605 ASSAY OF LACTIC ACID: CPT

## 2025-05-17 PROCEDURE — 83735 ASSAY OF MAGNESIUM: CPT

## 2025-05-17 PROCEDURE — 3E043XZ INTRODUCTION OF VASOPRESSOR INTO CENTRAL VEIN, PERCUTANEOUS APPROACH: ICD-10-PCS | Performed by: INTERNAL MEDICINE

## 2025-05-17 PROCEDURE — 2580000003 HC RX 258

## 2025-05-17 PROCEDURE — 99291 CRITICAL CARE FIRST HOUR: CPT | Performed by: INTERNAL MEDICINE

## 2025-05-17 PROCEDURE — 6360000002 HC RX W HCPCS: Performed by: INTERNAL MEDICINE

## 2025-05-17 PROCEDURE — 71045 X-RAY EXAM CHEST 1 VIEW: CPT

## 2025-05-17 PROCEDURE — 37799 UNLISTED PX VASCULAR SURGERY: CPT

## 2025-05-17 PROCEDURE — 82140 ASSAY OF AMMONIA: CPT

## 2025-05-17 PROCEDURE — 80202 ASSAY OF VANCOMYCIN: CPT

## 2025-05-17 PROCEDURE — 80053 COMPREHEN METABOLIC PANEL: CPT

## 2025-05-17 PROCEDURE — 85652 RBC SED RATE AUTOMATED: CPT

## 2025-05-17 PROCEDURE — 2500000003 HC RX 250 WO HCPCS

## 2025-05-17 PROCEDURE — P9047 ALBUMIN (HUMAN), 25%, 50ML: HCPCS

## 2025-05-17 PROCEDURE — 94660 CPAP INITIATION&MGMT: CPT

## 2025-05-17 PROCEDURE — 99233 SBSQ HOSP IP/OBS HIGH 50: CPT | Performed by: INTERNAL MEDICINE

## 2025-05-17 PROCEDURE — 85014 HEMATOCRIT: CPT

## 2025-05-17 PROCEDURE — 86140 C-REACTIVE PROTEIN: CPT

## 2025-05-17 PROCEDURE — 90935 HEMODIALYSIS ONE EVALUATION: CPT

## 2025-05-17 PROCEDURE — 84100 ASSAY OF PHOSPHORUS: CPT

## 2025-05-17 PROCEDURE — 2000000000 HC ICU R&B

## 2025-05-17 PROCEDURE — 85018 HEMOGLOBIN: CPT

## 2025-05-17 RX ORDER — HYDROCORTISONE SODIUM SUCCINATE 100 MG/2ML
100 INJECTION INTRAMUSCULAR; INTRAVENOUS EVERY 8 HOURS
Status: DISCONTINUED | OUTPATIENT
Start: 2025-05-17 | End: 2025-05-20

## 2025-05-17 RX ORDER — SODIUM CHLORIDE 9 MG/ML
INJECTION, SOLUTION INTRAVENOUS
Status: COMPLETED
Start: 2025-05-17 | End: 2025-05-17

## 2025-05-17 RX ORDER — VASOPRESSIN 20 [USP'U]/ML
INJECTION, SOLUTION INTRAVENOUS
Status: COMPLETED
Start: 2025-05-17 | End: 2025-05-17

## 2025-05-17 RX ORDER — NOREPINEPHRINE BITARTRATE 0.06 MG/ML
1-100 INJECTION, SOLUTION INTRAVENOUS CONTINUOUS
Status: DISCONTINUED | OUTPATIENT
Start: 2025-05-17 | End: 2025-05-22

## 2025-05-17 RX ORDER — NOREPINEPHRINE BITARTRATE 0.06 MG/ML
INJECTION, SOLUTION INTRAVENOUS
Status: COMPLETED
Start: 2025-05-17 | End: 2025-05-17

## 2025-05-17 RX ORDER — ALBUMIN (HUMAN) 12.5 G/50ML
25 SOLUTION INTRAVENOUS EVERY 8 HOURS
Status: COMPLETED | OUTPATIENT
Start: 2025-05-17 | End: 2025-05-18

## 2025-05-17 RX ORDER — LACTULOSE 10 G/15ML
20 SOLUTION ORAL 3 TIMES DAILY
Status: DISCONTINUED | OUTPATIENT
Start: 2025-05-17 | End: 2025-05-29

## 2025-05-17 RX ADMIN — VASOPRESSIN 0.03 UNITS/MIN: 20 INJECTION INTRAVENOUS at 18:32

## 2025-05-17 RX ADMIN — LEVOTHYROXINE SODIUM 100 MCG: 0.1 TABLET ORAL at 05:35

## 2025-05-17 RX ADMIN — ACETAMINOPHEN 650 MG: 325 TABLET ORAL at 21:27

## 2025-05-17 RX ADMIN — IPRATROPIUM BROMIDE AND ALBUTEROL SULFATE 1 DOSE: 2.5; .5 SOLUTION RESPIRATORY (INHALATION) at 08:06

## 2025-05-17 RX ADMIN — HYDROCORTISONE SODIUM SUCCINATE 100 MG: 100 INJECTION INTRAMUSCULAR; INTRAVENOUS at 13:48

## 2025-05-17 RX ADMIN — IPRATROPIUM BROMIDE AND ALBUTEROL SULFATE 1 DOSE: 2.5; .5 SOLUTION RESPIRATORY (INHALATION) at 11:34

## 2025-05-17 RX ADMIN — SALINE NASAL SPRAY 1 SPRAY: 1.5 SOLUTION NASAL at 09:04

## 2025-05-17 RX ADMIN — SODIUM CHLORIDE: 9 INJECTION, SOLUTION INTRAVENOUS at 10:03

## 2025-05-17 RX ADMIN — ACETAMINOPHEN 650 MG: 325 TABLET ORAL at 05:35

## 2025-05-17 RX ADMIN — HYDROMORPHONE HYDROCHLORIDE 0.25 MG: 1 INJECTION, SOLUTION INTRAMUSCULAR; INTRAVENOUS; SUBCUTANEOUS at 09:21

## 2025-05-17 RX ADMIN — Medication 35 MCG/MIN: at 18:19

## 2025-05-17 RX ADMIN — EPOETIN ALFA-EPBX 3000 UNITS: 3000 INJECTION, SOLUTION INTRAVENOUS; SUBCUTANEOUS at 18:18

## 2025-05-17 RX ADMIN — ALBUMIN (HUMAN) 25 G: 0.25 INJECTION, SOLUTION INTRAVENOUS at 20:44

## 2025-05-17 RX ADMIN — PIPERACILLIN AND TAZOBACTAM 4500 MG: 4; .5 INJECTION, POWDER, LYOPHILIZED, FOR SOLUTION INTRAVENOUS at 01:52

## 2025-05-17 RX ADMIN — TAMSULOSIN HYDROCHLORIDE 0.4 MG: 0.4 CAPSULE ORAL at 09:00

## 2025-05-17 RX ADMIN — SALINE NASAL SPRAY 1 SPRAY: 1.5 SOLUTION NASAL at 20:41

## 2025-05-17 RX ADMIN — IPRATROPIUM BROMIDE AND ALBUTEROL SULFATE 1 DOSE: 2.5; .5 SOLUTION RESPIRATORY (INHALATION) at 19:56

## 2025-05-17 RX ADMIN — SODIUM CHLORIDE 100 ML: 9 INJECTION, SOLUTION INTRAVENOUS at 18:31

## 2025-05-17 RX ADMIN — ATORVASTATIN CALCIUM 40 MG: 40 TABLET, FILM COATED ORAL at 09:00

## 2025-05-17 RX ADMIN — Medication 2000 UNITS: at 09:00

## 2025-05-17 RX ADMIN — SERTRALINE 50 MG: 50 TABLET, FILM COATED ORAL at 09:00

## 2025-05-17 RX ADMIN — SODIUM CHLORIDE, PRESERVATIVE FREE 10 ML: 5 INJECTION INTRAVENOUS at 20:41

## 2025-05-17 RX ADMIN — HYDROCORTISONE SODIUM SUCCINATE 100 MG: 100 INJECTION INTRAMUSCULAR; INTRAVENOUS at 20:40

## 2025-05-17 RX ADMIN — VASOPRESSIN 20 UNITS: 20 INJECTION INTRAVENOUS at 18:30

## 2025-05-17 RX ADMIN — SODIUM CHLORIDE, PRESERVATIVE FREE 10 ML: 5 INJECTION INTRAVENOUS at 09:03

## 2025-05-17 RX ADMIN — Medication 29 MCG/MIN: at 10:09

## 2025-05-17 RX ADMIN — MIDODRINE HYDROCHLORIDE 10 MG: 10 TABLET ORAL at 08:49

## 2025-05-17 RX ADMIN — LEVOTHYROXINE SODIUM 50 MCG: 0.05 TABLET ORAL at 05:36

## 2025-05-17 RX ADMIN — SALINE NASAL SPRAY 1 SPRAY: 1.5 SOLUTION NASAL at 13:50

## 2025-05-17 RX ADMIN — PIPERACILLIN AND TAZOBACTAM 4500 MG: 4; .5 INJECTION, POWDER, LYOPHILIZED, FOR SOLUTION INTRAVENOUS at 12:23

## 2025-05-17 RX ADMIN — HYDROMORPHONE HYDROCHLORIDE 0.25 MG: 1 INJECTION, SOLUTION INTRAMUSCULAR; INTRAVENOUS; SUBCUTANEOUS at 22:15

## 2025-05-17 RX ADMIN — ACETAMINOPHEN 650 MG: 325 TABLET ORAL at 15:40

## 2025-05-17 RX ADMIN — ALBUMIN (HUMAN) 25 G: 0.25 INJECTION, SOLUTION INTRAVENOUS at 13:13

## 2025-05-17 RX ADMIN — IPRATROPIUM BROMIDE AND ALBUTEROL SULFATE 1 DOSE: 2.5; .5 SOLUTION RESPIRATORY (INHALATION) at 16:03

## 2025-05-17 RX ADMIN — VASOPRESSIN 0.03 UNITS/MIN: 20 INJECTION INTRAVENOUS at 10:01

## 2025-05-17 RX ADMIN — MIDODRINE HYDROCHLORIDE 10 MG: 10 TABLET ORAL at 12:20

## 2025-05-17 RX ADMIN — MIDODRINE HYDROCHLORIDE 10 MG: 10 TABLET ORAL at 15:40

## 2025-05-17 RX ADMIN — VASOPRESSIN: 20 INJECTION INTRAVENOUS at 10:06

## 2025-05-17 ASSESSMENT — PAIN DESCRIPTION - DESCRIPTORS
DESCRIPTORS: ACHING;DISCOMFORT
DESCRIPTORS: ACHING
DESCRIPTORS: ACHING;DISCOMFORT

## 2025-05-17 ASSESSMENT — PAIN DESCRIPTION - ORIENTATION
ORIENTATION: LOWER
ORIENTATION: LOWER
ORIENTATION: UPPER
ORIENTATION: UPPER

## 2025-05-17 ASSESSMENT — PAIN DESCRIPTION - LOCATION
LOCATION: BACK

## 2025-05-17 ASSESSMENT — PAIN SCALES - GENERAL
PAINLEVEL_OUTOF10: 8
PAINLEVEL_OUTOF10: 8
PAINLEVEL_OUTOF10: 9
PAINLEVEL_OUTOF10: 0
PAINLEVEL_OUTOF10: 10
PAINLEVEL_OUTOF10: 9

## 2025-05-17 ASSESSMENT — PAIN - FUNCTIONAL ASSESSMENT
PAIN_FUNCTIONAL_ASSESSMENT: ACTIVITIES ARE NOT PREVENTED
PAIN_FUNCTIONAL_ASSESSMENT: PREVENTS OR INTERFERES SOME ACTIVE ACTIVITIES AND ADLS
PAIN_FUNCTIONAL_ASSESSMENT: ACTIVITIES ARE NOT PREVENTED

## 2025-05-17 ASSESSMENT — PAIN SCALES - WONG BAKER: WONGBAKER_NUMERICALRESPONSE: NO HURT

## 2025-05-17 NOTE — PROGRESS NOTES
Department of Internal Medicine  Nephrology Attending Consult Note      Events reviewed.    SUBJECTIVE: We are following Mr. Wander Rocha for CKD.  Reports no new complaints.    PHYSICAL EXAM:      Vitals:    VITALS:  BP (!) 112/42   Pulse 67   Temp 99.1 °F (37.3 °C) (Bladder)   Resp 16   Ht 1.702 m (5' 7\")   Wt 99.2 kg (218 lb 11.1 oz)   SpO2 93%   BMI 34.25 kg/m²   24HR INTAKE/OUTPUT:    Intake/Output Summary (Last 24 hours) at 5/17/2025 1113  Last data filed at 5/17/2025 1000  Gross per 24 hour   Intake 3568.07 ml   Output 2130 ml   Net 1438.07 ml       Constitutional:  Awake, alert, oriented, in NAD  HEENT:  PERRLA, normocephalic, atraumatic  Respiratory:  wheezes  Cardiovascular/Edema:  RRR, normal S1, normal S2, + edema  Gastrointestinal: Distended  Neurologic:  Nonfocal  Skin:  warm, dry, no rashes, no lesions    Scheduled Meds:   [Held by provider] lactulose  20 g Oral TID    [Held by provider] rifAXIMin  400 mg Oral TID    vancomycin (VANCOCIN) intermittent dosing (placeholder)   Other RX Placeholder    piperacillin-tazobactam  4,500 mg IntraVENous Q12H    ipratropium 0.5 mg-albuterol 2.5 mg  1 Dose Inhalation Q4H WA RT    sevelamer  0.8 g Oral TID WC    sodium chloride  1 spray Each Nostril TID    midodrine  10 mg Oral TID WC    [Held by provider] metoprolol succinate  12.5 mg Oral Daily    epoetin lay-epbx  3,000 Units SubCUTAneous Once per day on Tuesday Thursday Saturday    [Held by provider] ferric gluconate (FERRLECIT) 125 mg in sodium chloride 0.9 % 100 mL IVPB  125 mg IntraVENous Daily    sodium chloride flush  5-40 mL IntraVENous 2 times per day    [Held by provider] acetaZOLAMIDE  250 mg Oral Daily    [Held by provider] allopurinol  200 mg Oral Daily    [Held by provider] apixaban  5 mg Oral BID    [Held by provider] aspirin  81 mg Oral Daily    atorvastatin  40 mg Oral Daily    [Held by provider] bumetanide  2 mg Oral BID    vitamin D  2,000 Units Oral Daily    [Held by provider]  5.9 05/17/2025 04:08 AM     Radiology Review:    XR CHEST PORTABLE 5/15/25    IMPRESSION:  No significant interval change. Multifocal bilateral infiltrates with  significant size right and small left pleural effusion.    US RETROPERITONEAL COMPLETE 5/15/25    IMPRESSION:  1. Mildly increased echogenicity of the renal cortices most suggestive of  intrinsic renal disease. No hydronephrosis.  2. Splenomegaly.  3. Perihepatic ascites.  4. Right pleural effusion.      BRIEF SUMMARY OF INITIAL CONSULT:    Briefly, Wander Rocha is a 69-year-old male known to us, past medical history CKD stage 3b probably 2/2 nephrosclerosis and previous episode of ischemic ATN requiring temporary HD in May 2017, recurrent episode of ischemic ATN in March 2022 also requiring HD x2 with fair recovery of renal function, baseline creatinine 2.0 mg/dL, HTN, DM type II, A. fib on apixaban, HFpEF 50-55%, NIKO, pacemaker placement, hypothyroidism, hyperlipidemia, who was recently discharged and readmitted on 5/14/2025 for abnormal labs.  Lab work showed sodium 130, potassium 5.5 mmol/L, , creatinine 4.9 mL grams per deciliter, proBNP 3981, reason for this consultation.  Chest x-ray shows bilateral pleural effusions.  Significant home medications include Jardiance, acetazolamide, Bumex, lisinopril and metoprolol.        IMPRESSION/RECOMMENDATIONS:      RADHA stage II on CKD likely 2/2 hemodynamically mediated acute decompensated heart failure, started on renal replacement therapy on 5/16/2025, had first treatment yesterday, tolerated well, for second treatment today.     CKD stage 3b probably 2/2 nephrosclerosis and previous recurrent episodes of ischemic ATN requiring dialysis,  baseline creatinine 2.0 mg/dL     Hypotonic hyponatremia with hypervolemia, 2/2 acute decompensated heart failure, resolved, sodium levels improved     HFmEF 45% improved 50-55%, proBNP 3635  Normocytic anemia, ferritin 112, iron saturation 8%, folate 8.4, B12 687, on

## 2025-05-17 NOTE — PROGRESS NOTES
Pharmacy Consultation Note  (Antibiotic Dosing and Monitoring)    Initial consult date: 5/16/25  Consulting physician/provider: Chelsey  Drug: Vancomycin  Indication: Pneumonia (CAP/Nosocomial)    Age/  Gender Height Weight IBW  Allergy Information   69 y.o./male 170.2 cm (5' 7\") 99.5 kg (219 lb 5.7 oz)     Ideal body weight: 66.1 kg (145 lb 11.6 oz)  Adjusted ideal body weight: 79.3 kg (174 lb 14.6 oz)   Patient has no known allergies.      Renal Function: started HD: 5/16, 5/17,   Recent Labs     05/15/25  2111 05/16/25  0405 05/17/25  0408   * 149* 87*   CREATININE 3.9* 4.3* 2.8*       Intake/Output Summary (Last 24 hours) at 5/17/2025 0957  Last data filed at 5/17/2025 0900  Gross per 24 hour   Intake 3328.07 ml   Output 2170 ml   Net 1158.07 ml       Vancomycin Monitoring:  Trough:  No results for input(s): \"VANCOTROUGH\" in the last 72 hours.  Random:    Recent Labs     05/17/25  0408   VANCORANDOM 33.0       Vancomycin Administration Times:  Recent vancomycin administrations                     vancomycin (VANCOCIN) 2500 mg in sodium chloride 0.9 % 500 mL IVPB (mg) 2,500 mg New Bag 05/16/25 0803        Assessment:  Patient is a 69 y.o. male who has been initiated on vancomycin for PNA (CAP/nosocomial)  Estimated Creatinine Clearance: 27 mL/min (A) (based on SCr of 2.8 mg/dL (H)).  To dose vancomycin, pharmacy will be utilizing dosing based off of levels because of patient's renal impairment/insufficiency.  5/17: started HD 5/16, ordered for today also.  Received vanco 2500 mg x1 on 5/16 @ 0803 (before HD session). Cx's negative to date.    Plan:  Re-check random vanc level 5/18 AM.     Santana Greenfield, PharmD  5/17/2025  10:04 AM   or

## 2025-05-17 NOTE — PLAN OF CARE
Problem: Safety - Adult  Goal: Free from fall injury  5/17/2025 1020 by Jennifer Roberts RN  Outcome: Progressing  5/17/2025 0050 by Yudy Headley RN  Outcome: Progressing  5/16/2025 2215 by Nydia Collins RN  Outcome: Progressing  Flowsheets (Taken 5/16/2025 2000)  Free From Fall Injury:   Instruct family/caregiver on patient safety   Based on caregiver fall risk screen, instruct family/caregiver to ask for assistance with transferring infant if caregiver noted to have fall risk factors     Problem: Skin/Tissue Integrity  Goal: Skin integrity remains intact  Description: 1.  Monitor for areas of redness and/or skin breakdown2.  Assess vascular access sites hourly3.  Every 4-6 hours minimum:  Change oxygen saturation probe site4.  Every 4-6 hours:  If on nasal continuous positive airway pressure, respiratory therapy assess nares and determine need for appliance change or resting period  5/17/2025 1020 by Jennifer Roberts RN  Outcome: Progressing  5/17/2025 0050 by Yudy Headley RN  Outcome: Progressing  Flowsheets (Taken 5/17/2025 0040)  Skin Integrity Remains Intact: Monitor for areas of redness and/or skin breakdown  5/16/2025 2215 by Nydia Collins RN  Outcome: Progressing  Flowsheets  Taken 5/16/2025 2000 by Nydia Collins RN  Skin Integrity Remains Intact:   Monitor for areas of redness and/or skin breakdown   Assess vascular access sites hourly   Assess need for specialty bed   Pressure redistribution bed/mattress (bed type)   Check visual cues for pain  Taken 5/16/2025 0943 by Aleksandra Francis RN  Skin Integrity Remains Intact: Monitor for areas of redness and/or skin breakdown     Problem: ABCDS Injury Assessment  Goal: Absence of physical injury  5/17/2025 1020 by Jennifer Roberts RN  Outcome: Progressing  5/17/2025 0050 by Yudy Headley RN  Outcome: Progressing  5/16/2025 2215 by Nydia Collins RN  Outcome: Progressing  Flowsheets (Taken 5/16/2025 2000)  Absence of Physical Injury: Implement

## 2025-05-17 NOTE — PROGRESS NOTES
Attending Physician Attestation: Dr. Teo Serrano    Thank you very much for allowing me to see this patient in consultation and follow up.    I personally saw, examined and provided care for the patient. Radiographs, labs and medication list were reviewed by me independently. I spoke with bedside nursing, respiratory therapists and consultants. Critical care services and times documented are independent of procedures and multidisciplinary rounds with Residents. Additionally comprehensive, multidisciplinary rounds were conducted with the MICU team. The case was discussed in detail and plans for care were established. Review of Residents documentation was conducted and revisions were made as appropriate. I agree with the the above documented information.     Current Facility-Administered Medications   Medication Dose Route Frequency Provider Last Rate Last Admin    [Held by provider] lactulose (CHRONULAC) 10 GM/15ML solution 20 g  20 g Oral TID Elijah Rios MD        [Held by provider] rifAXIMin (XIFAXAN) tablet 400 mg  400 mg Oral TID Elijah Rios MD        VASOpressin 20 Units in sodium chloride 0.9 % 100 mL IVPB ([id number])  0.01-0.03 Units/min IntraVENous Continuous Elijah Rios MD 9 mL/hr at 05/17/25 1001 0.03 Units/min at 05/17/25 1001    norepinephrine (LEVOPHED) 16 mg in sodium chloride 0.9 % 250 mL infusion (premix)  1-100 mcg/min IntraVENous Continuous Teo Srerano MD 27.2 mL/hr at 05/17/25 1009 29 mcg/min at 05/17/25 1009    vancomycin (VANCOCIN) intermittent dosing (placeholder)   Other RX Placeholder Santana Greenfield, [facility]        0.9 % sodium chloride infusion   IntraVENous PRN Lizzy Barbosa MD        piperacillin-tazobactam (ZOSYN) 4,500 mg in sodium chloride 0.9 % 100 mL IVPB ([id number])  4,500 mg IntraVENous Q12H Lizzy Barbosa MD 25 mL/hr at 05/17/25 1223 4,500 mg at 05/17/25 1223    ipratropium 0.5 mg-albuterol 2.5 mg (DUONEB) nebulizer solution 1 Dose  1 Dose  Inhalation Q4H [facility] Mike Alejandro MD   1 Dose at 05/17/25 1134    sevelamer (RENVELA) packet 0.8 g  0.8 g Oral TID  Freedom Trent Jr., DO   0.8 g at 05/15/25 1725    oxymetazoline (AFRIN) 0.05 % nasal spray 2 spray  2 spray Each Nostril BID PRN Dona Vega DO   2 spray at 05/15/25 2156    sodium chloride (OCEAN, BABY AYR) 0.65 % nasal spray 1 spray  1 spray Each Nostril TID Dona Vega, DO   1 spray at 05/17/25 0904    midodrine (PROAMATINE) tablet 10 mg  10 mg Oral TID  Freedom Trent Jr., DO   10 mg at 05/17/25 1220    [Held by provider] metoprolol succinate (TOPROL XL) extended release tablet 12.5 mg  12.5 mg Oral Daily Feredom Trent Jr., DO        epoetin lay-epbx (RETACRIT) injection 3,000 Units  3,000 Units SubCUTAneous Once per day on Tuesday Thursday Saturday Manoj Chavez MD   3,000 Units at 05/15/25 2223    [Held by provider] ferric gluconate (FERRLECIT) 125 mg in sodium chloride 0.9 % 100 mL IVPB  125 mg IntraVENous Daily Manoj Chavez MD   Stopped at 05/15/25 2253    sodium chloride flush 0.9 % injection 5-40 mL  5-40 mL IntraVENous 2 times per day Lizzy Barbosa MD   10 mL at 05/17/25 0903    sodium chloride flush 0.9 % injection 5-40 mL  5-40 mL IntraVENous PRN Lizzy Barbosa MD        0.9 % sodium chloride infusion   IntraVENous PRN Lizzy Barbosa MD        glucose chewable tablet 16 g  4 tablet Oral PRN Lizzy Barbosa MD        dextrose bolus 10% 125 mL  125 mL IntraVENous PRN Lizzy Barbosa MD        Or    dextrose bolus 10% 250 mL  250 mL IntraVENous PRN Lizzy Barbosa MD        glucagon injection 1 mg  1 mg SubCUTAneous PRN Lizzy Barbosa MD        dextrose 10 % infusion   IntraVENous Continuous PRN Lizzy Barbosa MD        sodium chloride flush 0.9 % injection 5-40 mL  5-40 mL IntraVENous PRN Mike Alejandro MD        0.9 % sodium chloride infusion   IntraVENous PRN Mike Alejandro MD

## 2025-05-17 NOTE — PLAN OF CARE
Problem: Safety - Adult  Goal: Free from fall injury  Outcome: Progressing  Flowsheets (Taken 5/16/2025 2000)  Free From Fall Injury:   Instruct family/caregiver on patient safety   Based on caregiver fall risk screen, instruct family/caregiver to ask for assistance with transferring infant if caregiver noted to have fall risk factors     Problem: Chronic Conditions and Co-morbidities  Goal: Patient's chronic conditions and co-morbidity symptoms are monitored and maintained or improved  Outcome: Progressing  Flowsheets (Taken 5/16/2025 2000)  Care Plan - Patient's Chronic Conditions and Co-Morbidity Symptoms are Monitored and Maintained or Improved:   Monitor and assess patient's chronic conditions and comorbid symptoms for stability, deterioration, or improvement   Collaborate with multidisciplinary team to address chronic and comorbid conditions and prevent exacerbation or deterioration   Update acute care plan with appropriate goals if chronic or comorbid symptoms are exacerbated and prevent overall improvement and discharge     Problem: Discharge Planning  Goal: Discharge to home or other facility with appropriate resources  Outcome: Progressing  Flowsheets (Taken 5/16/2025 2000)  Discharge to home or other facility with appropriate resources:   Identify barriers to discharge with patient and caregiver   Arrange for needed discharge resources and transportation as appropriate   Identify discharge learning needs (meds, wound care, etc)   Refer to discharge planning if patient needs post-hospital services based on physician order or complex needs related to functional status, cognitive ability or social support system   Arrange for interpreters to assist at discharge as needed     Problem: Skin/Tissue Integrity  Goal: Skin integrity remains intact  Description: 1.  Monitor for areas of redness and/or skin breakdown2.  Assess vascular access sites hourly3.  Every 4-6 hours minimum:  Change oxygen saturation probe

## 2025-05-17 NOTE — PROGRESS NOTES
OTOLARYNGOLOGY  DAILY PROGRESS NOTE  2025    Subjective:   No acute events overnight. Patient denies any active bleeding but is complaining of back pain. Remains on NC.     Objective:/     BP (!) 124/43   Pulse 75   Temp 99 °F (37.2 °C) (Bladder)   Resp 25   Ht 1.702 m (5' 7\")   Wt 99.2 kg (218 lb 11.1 oz)   SpO2 94%   BMI 34.25 kg/m²   PULSE OXIMETRY RANGE: SpO2  Av.4 %  Min: 91 %  Max: 100 %  I/O last 3 completed shifts:  In: 3472.9 [I.V.:234.6; Blood:400; Other:50; IV Piggyback:1788.3]  Out:  [Urine:1105]    General Appearance:  Laying in bed, awake, alert, no apparent distress  Head/face:  NC/AT  Eyes: PERRL, EOMI  ENT: Bilateral external ears WNL, Left nare with dissolvable packing in place, right nare with dried crusts of blood, very dry oral mucosa, no blood/clots in posterior OP  Neck:Supple, no adenopathy  Lungs:  Non-labored, good respiratory effort, no stridor.  On humidified NC  Heart:  RR  Neuro: Facial nerve symmetric and intact. House Brackmann 1/6, bilaterally.     CBC  Recent Labs     05/15/25  2111 05/16/25  0405 05/16/25  1135 25  1809 25  0408   WBC 22.5* 28.1*  --   --  26.4*   HGB 7.3* 6.3* 7.6* 8.4* 8.6*   HCT 23.2* 20.1* 23.4* 26.0* 26.5*    148  --   --  200     BMP  Recent Labs     25  040      K 4.8   CL 98   CO2 20*   BUN 87*   CREATININE 2.8*   CALCIUM 8.0*     PT-INR  Recent Labs     25   INR 2.0     CALCIUM  Recent Labs     05/15/25  2111 05/16/25  0405 25  040   CALCIUM 8.8 8.3* 8.0*       Assessment/Plan:     69 y.o. male male with recurrent epistaxis in the setting of chronic oxygen use on Eliquis and aspirin without current active bleeding     Recommend humidified oxygen via facemask or face tent  Avoid nasal cannula as patient has nasal packing in place and this will increase risk of bleeding and not provide patient with adequate oxygenation, discussed with RN  S/P left anterior septum chemical cauterization

## 2025-05-17 NOTE — PROGRESS NOTES
[facility]  Internal Medicine Residency Program  Progress Note - House Team 2    Patient:  Wander Rocha 69 y.o. male MRN: [id number]     Date of Service: 5/18/2025     CC: shortness of breath    Days since admission: 4    Subjective     Overnight events: Dilaudid PRN for pain    He was seen and examined at bedside this morning.  He had no acute concerns today.  He is on 10 L/min via HFNC.  He denied any recurrence of nosebleeding.  He is still on 2 pressors, started on steroids and given 3 doses of albumin.  He had a bowel movement last night.    Objective     VS: BP (!) 138/48   Pulse 84   Temp 98.6 °F (37 °C) (Bladder)   Resp 17   Ht 1.702 m (5' 7\")   Wt 98.2 kg (216 lb 7.9 oz)   SpO2 99%   BMI 33.91 kg/m²   ABP (Arterial line BP): 127/51  ABP Mean (Arterial Line Mean): 76 mmHg  I & O - 24hr:   Intake/Output Summary (Last 24 hours) at 5/18/2025 0829  Last data filed at 5/18/2025 0659  Gross per 24 hour   Intake 2050.49 ml   Output 1765 ml   Net 285.49 ml     Net IO Since Admission: 1,092.12 mL [05/18/25 0829]    Physical Exam:  General Appearance: Not in distress, conversant  HEENT: Normocephalic, atraumatic  Neck: midline trachea  Lung: (+) Crackles bilaterally  Heart: regular rate and rhythm, no murmur  Abdomen: soft, (+) distended abdomen  Extremities: no cyanosis or edema (+) arterial line, R brachial  Musculokeletal: No joint swelling  Neurologic: Mental status: awake, alert    Interventions:  Procedures: None  Airway: 10 L/min via HFNC    Medications     Continuous Infusions:   VASOpressin 20 Units in sodium chloride 0.9 % 100 mL IVPB ([id number]) 0.03 Units/min (05/18/25 0807)    norepinephrine 35 mcg/min (05/18/25 0659)    sodium chloride      sodium chloride      dextrose      sodium chloride       Scheduled Meds:   [Held by provider] lactulose  20 g Oral TID    [Held by provider] rifAXIMin  400 mg Oral TID    vancomycin (VANCOCIN) intermittent dosing (placeholder)   Other RX

## 2025-05-17 NOTE — PLAN OF CARE
Problem: Safety - Adult  Goal: Free from fall injury  5/17/2025 0050 by Yudy Headley RN  Outcome: Progressing     Problem: Chronic Conditions and Co-morbidities  Goal: Patient's chronic conditions and co-morbidity symptoms are monitored and maintained or improved  5/17/2025 0050 by Yudy Headley RN  Outcome: Progressing     Problem: Discharge Planning  Goal: Discharge to home or other facility with appropriate resources  5/17/2025 0050 by Yudy Headley RN  Outcome: Not Progressing     Problem: Skin/Tissue Integrity  Goal: Skin integrity remains intact  Description: 1.  Monitor for areas of redness and/or skin breakdown2.  Assess vascular access sites hourly3.  Every 4-6 hours minimum:  Change oxygen saturation probe site4.  Every 4-6 hours:  If on nasal continuous positive airway pressure, respiratory therapy assess nares and determine need for appliance change or resting period  5/17/2025 0050 by Yudy Headley RN  Outcome: Progressing  Flowsheets (Taken 5/17/2025 0040)  Skin Integrity Remains Intact: Monitor for areas of redness and/or skin breakdown     Problem: ABCDS Injury Assessment  Goal: Absence of physical injury  5/17/2025 0050 by Yudy Headley RN  Outcome: Progressing     Problem: Pain  Goal: Verbalizes/displays adequate comfort level or baseline comfort level  5/17/2025 0050 by Yudy Headley RN  Outcome: Progressing     Problem: Discharge Planning  Goal: Discharge to home or other facility with appropriate resources  5/17/2025 0050 by Yudy Headley RN  Outcome: Not Progressing  5/16/2025 2215 by Nydia Collins RN  Outcome: Progressing  Flowsheets (Taken 5/16/2025 2000)  Discharge to home or other facility with appropriate resources:   Identify barriers to discharge with patient and caregiver   Arrange for needed discharge resources and transportation as appropriate   Identify discharge learning needs (meds, wound care, etc)   Refer to discharge planning if patient needs post-hospital

## 2025-05-17 NOTE — PROGRESS NOTES
[facility]  Department of Internal Medicine   Internal Medicine Residency   MICU Progress Note    Patient:  Wander Rocha 69 y.o. male  MRN: [id number]     Date of Service: 5/17/2025    Allergy: Patient has no known allergies.    Subjective     -Patient seen and examined.  -Was given hydromorphone for abdominal and back  pain yesterday.  -Complains of back and abdominal pain.  -MAP this morning around 65, Vaso was added.  -Dialysis today.  Objective     VS: BP (!) 124/43   Pulse 75   Temp 99 °F (37.2 °C) (Bladder)   Resp 25   Ht 1.702 m (5' 7\")   Wt 99.2 kg (218 lb 11.1 oz)   SpO2 94%   BMI 34.25 kg/m²   ABP (Arterial line BP): 114/43  ABP Mean (Arterial Line Mean): 65 mmHg    I & O - 24hr:   Intake/Output Summary (Last 24 hours) at 5/17/2025 0911  Last data filed at 5/17/2025 0550  Gross per 24 hour   Intake 3328.07 ml   Output 1895 ml   Net 1433.07 ml       Physical Exam:  General Appearance: In pain, was not able to answer all my question.^ L oxygen NC  Neck: no adenopathy, no carotid bruit, no JVD, supple, symmetrical, trachea midline, and thyroid not enlarged, symmetric, no tenderness/mass/nodules  Lung: clear to auscultation bilaterally  Heart: regular rate and rhythm, S1, S2 normal, no murmur, click, rub or gallop  Abdomen: soft, non-tender; bowel sounds normal; no masses,  no organomegaly  Extremities:  extremities normal, atraumatic, no cyanosis or edema  Musculoskeletal: No joint swelling, no muscle tenderness. ROM normal in all joints of extremities.   Neurologic: Mental status: Alert, oriented, thought content appropriate    Lines     site day    Art line   Right brachial  05/16   TLC None    PICC None    Hemoaccess R Fem      Lab Results   Component Value Date/Time    PH 7.336 05/16/2025 04:44 AM    PCO2 42.2 05/16/2025 04:44 AM    PO2 104.9 05/16/2025 04:44 AM    HCO3 22.1 05/16/2025 04:44 AM    BE -3.5 05/16/2025 04:44 AM    THB 7.3 05/16/2025 04:44 AM    O2SAT 97.5 05/16/2025

## 2025-05-17 NOTE — FLOWSHEET NOTE
05/17/25 1930   Vital Signs   BP (!) 138/48   Temp 97.5 °F (36.4 °C)   Pulse 80   Respirations 20   Weight - Scale 98.2 kg (216 lb 7.9 oz)   Weight Method Bed scale   Percent Weight Change -1.01   Pain Assessment   Pain Assessment None - Denies Pain   Post-Hemodialysis Assessment   Post-Treatment Procedures Blood returned;Catheter capped, clamped and heparinized x 2 ports   Machine Disinfection Process Acid/Vinegar Clean;Exterior Machine Disinfection;Heat Disinfect   Rinseback Volume (ml) 300 ml   Blood Volume Processed (Liters) 70 L   Dialyzer Clearance Lightly streaked   Duration of Treatment (minutes) 240 minutes   Hemodialysis Intake (ml) 300 ml   Hemodialysis Output (ml) 1300 ml   NET Removed (ml) 1000   Tolerated Treatment Good   Bilateral Breath Sounds Diminished   Edema None   Time Off 1915   Patient Disposition Remain in ICU/ED   Observations & Evaluations   Level of Consciousness 1   Oriented X 1   Heart Rhythm Regular   Respiratory Quality/Effort Unlabored   O2 Device PAP (positive airway pressure)   Skin Color Pale   Skin Condition/Temp Dry;Warm   Abdomen Inspection Distended   Comments Pt tolerated well, net UF 1 L. Report given to KANDI Rodriguez.   Access   Add Access? Yes  (R groin temporary dialysis catheter, dressing changed.)

## 2025-05-18 ENCOUNTER — APPOINTMENT (OUTPATIENT)
Dept: GENERAL RADIOLOGY | Age: 70
DRG: 871 | End: 2025-05-18
Payer: MEDICARE

## 2025-05-18 ENCOUNTER — APPOINTMENT (OUTPATIENT)
Dept: ULTRASOUND IMAGING | Age: 70
DRG: 871 | End: 2025-05-18
Payer: MEDICARE

## 2025-05-18 LAB
ALBUMIN SERPL-MCNC: 4.1 G/DL (ref 3.5–5.2)
ALP SERPL-CCNC: 80 U/L (ref 40–129)
ALT SERPL-CCNC: 26 U/L (ref 0–50)
ANION GAP SERPL CALCULATED.3IONS-SCNC: 14 MMOL/L (ref 7–16)
ANION GAP SERPL CALCULATED.3IONS-SCNC: 19 MMOL/L (ref 7–16)
ANION GAP SERPL CALCULATED.3IONS-SCNC: 26 MMOL/L (ref 7–16)
APPEARANCE FLD: NORMAL
AST SERPL-CCNC: 24 U/L (ref 0–50)
B-OH-BUTYR SERPL-MCNC: >4.5 MMOL/L (ref 0.02–0.27)
B.E.: -8.4 MMOL/L (ref -3–3)
BASOPHILS # BLD: 0 K/UL (ref 0–0.2)
BASOPHILS NFR BLD: 0 % (ref 0–2)
BILIRUB SERPL-MCNC: 0.9 MG/DL (ref 0–1.2)
BNP SERPL-MCNC: 9438 PG/ML (ref 0–125)
BODY FLD TYPE: NORMAL
BUN SERPL-MCNC: 52 MG/DL (ref 8–23)
BUN SERPL-MCNC: 63 MG/DL (ref 8–23)
BUN SERPL-MCNC: 66 MG/DL (ref 8–23)
CALCIUM SERPL-MCNC: 8 MG/DL (ref 8.8–10.2)
CALCIUM SERPL-MCNC: 8.1 MG/DL (ref 8.8–10.2)
CALCIUM SERPL-MCNC: 8.3 MG/DL (ref 8.8–10.2)
CHLORIDE SERPL-SCNC: 100 MMOL/L (ref 98–107)
CHLORIDE SERPL-SCNC: 95 MMOL/L (ref 98–107)
CHLORIDE SERPL-SCNC: 99 MMOL/L (ref 98–107)
CLOT CHECK: NORMAL
CO2 SERPL-SCNC: 17 MMOL/L (ref 22–29)
CO2 SERPL-SCNC: 22 MMOL/L (ref 22–29)
CO2 SERPL-SCNC: 24 MMOL/L (ref 22–29)
COHB: 0.6 % (ref 0–1.5)
COLOR FLD: NORMAL
CORTIS SERPL-MCNC: 47.6 UG/DL (ref 2.7–18.4)
CORTISOL COLLECTION INFO: ABNORMAL
CREAT SERPL-MCNC: 2 MG/DL (ref 0.7–1.2)
CREAT SERPL-MCNC: 2.5 MG/DL (ref 0.7–1.2)
CREAT SERPL-MCNC: 2.7 MG/DL (ref 0.7–1.2)
CRITICAL: ABNORMAL
DATE ANALYZED: ABNORMAL
DATE LAST DOSE: NORMAL
DATE OF COLLECTION: ABNORMAL
EOSINOPHIL # BLD: 0 K/UL (ref 0.05–0.5)
EOSINOPHILS RELATIVE PERCENT: 0 % (ref 0–6)
ERYTHROCYTE [DISTWIDTH] IN BLOOD BY AUTOMATED COUNT: 17.9 % (ref 11.5–15)
GFR, ESTIMATED: 25 ML/MIN/1.73M2
GFR, ESTIMATED: 27 ML/MIN/1.73M2
GFR, ESTIMATED: 35 ML/MIN/1.73M2
GLUCOSE BLD-MCNC: 130 MG/DL (ref 74–99)
GLUCOSE BLD-MCNC: 139 MG/DL (ref 74–99)
GLUCOSE BLD-MCNC: 142 MG/DL (ref 74–99)
GLUCOSE BLD-MCNC: 157 MG/DL (ref 74–99)
GLUCOSE BLD-MCNC: 171 MG/DL (ref 74–99)
GLUCOSE BLD-MCNC: 201 MG/DL (ref 74–99)
GLUCOSE BLD-MCNC: 214 MG/DL (ref 74–99)
GLUCOSE BLD-MCNC: 222 MG/DL (ref 74–99)
GLUCOSE BLD-MCNC: 225 MG/DL (ref 74–99)
GLUCOSE BLD-MCNC: 225 MG/DL (ref 74–99)
GLUCOSE BLD-MCNC: 238 MG/DL (ref 74–99)
GLUCOSE SERPL-MCNC: 164 MG/DL (ref 74–99)
GLUCOSE SERPL-MCNC: 209 MG/DL (ref 74–99)
GLUCOSE SERPL-MCNC: 236 MG/DL (ref 74–99)
HBA1C MFR BLD: 7.4 % (ref 4–5.6)
HCO3: 18.3 MMOL/L (ref 22–26)
HCT VFR BLD AUTO: 27.6 % (ref 37–54)
HCT VFR BLD AUTO: 28.2 % (ref 37–54)
HGB BLD-MCNC: 8.4 G/DL (ref 12.5–16.5)
HGB BLD-MCNC: 8.6 G/DL (ref 12.5–16.5)
HHB: 7.6 % (ref 0–5)
LAB: ABNORMAL
LACTATE BLDV-SCNC: 1.5 MMOL/L (ref 0.5–2.2)
LYMPHOCYTES NFR BLD: 1.2 K/UL (ref 1.5–4)
LYMPHOCYTES RELATIVE PERCENT: 6 % (ref 20–42)
Lab: 814
MAGNESIUM SERPL-MCNC: 2.1 MG/DL (ref 1.6–2.4)
MAGNESIUM SERPL-MCNC: 2.3 MG/DL (ref 1.6–2.4)
MAGNESIUM SERPL-MCNC: 2.3 MG/DL (ref 1.6–2.4)
MCH RBC QN AUTO: 30.1 PG (ref 26–35)
MCHC RBC AUTO-ENTMCNC: 30.5 G/DL (ref 32–34.5)
MCV RBC AUTO: 98.6 FL (ref 80–99.9)
METHB: 0.6 % (ref 0–1.5)
MODE: ABNORMAL
MONOCYTES NFR BLD: 0.51 K/UL (ref 0.1–0.95)
MONOCYTES NFR BLD: 3 % (ref 2–12)
MONOCYTES NFR FLD: 56 %
MYELOCYTES ABSOLUTE COUNT: 0.17 K/UL
MYELOCYTES: 1 %
NEUTROPHILS NFR BLD: 90 % (ref 43–80)
NEUTROPHILS NFR FLD: 44 %
NEUTS SEG NFR BLD: 17.82 K/UL (ref 1.8–7.3)
O2 SATURATION: 92.3 % (ref 92–98.5)
O2HB: 91.2 % (ref 94–97)
OPERATOR ID: 7292
PATIENT TEMP: 37 C
PCO2: 42.9 MMHG (ref 35–45)
PH BLOOD GAS: 7.25 (ref 7.35–7.45)
PHOSPHATE SERPL-MCNC: 5.4 MG/DL (ref 2.5–4.5)
PHOSPHATE SERPL-MCNC: 6.1 MG/DL (ref 2.5–4.5)
PHOSPHATE SERPL-MCNC: 6.2 MG/DL (ref 2.5–4.5)
PLATELET # BLD AUTO: 264 K/UL (ref 130–450)
PMV BLD AUTO: 10.4 FL (ref 7–12)
PO2: 72 MMHG (ref 75–100)
POTASSIUM SERPL-SCNC: 4.3 MMOL/L (ref 3.5–5.1)
POTASSIUM SERPL-SCNC: 4.5 MMOL/L (ref 3.5–5.1)
POTASSIUM SERPL-SCNC: 4.7 MMOL/L (ref 3.5–5.1)
PROT SERPL-MCNC: 7.2 G/DL (ref 6.4–8.3)
RBC # BLD AUTO: 2.86 M/UL (ref 3.8–5.8)
RBC # BLD: ABNORMAL 10*6/UL
RBC # FLD: NORMAL CELLS/UL
SODIUM SERPL-SCNC: 137 MMOL/L (ref 136–145)
SODIUM SERPL-SCNC: 138 MMOL/L (ref 136–145)
SODIUM SERPL-SCNC: 139 MMOL/L (ref 136–145)
SOURCE, BLOOD GAS: ABNORMAL
THB: 9.6 G/DL (ref 11.5–16.5)
TIME ANALYZED: 816
TME LAST DOSE: NORMAL H
VANCOMYCIN DOSE: NORMAL MG
VANCOMYCIN SERPL-MCNC: 23.5 UG/ML (ref 5–40)
WBC # FLD: 417 CELLS/UL
WBC OTHER # BLD: 19.7 K/UL (ref 4.5–11.5)

## 2025-05-18 PROCEDURE — 94640 AIRWAY INHALATION TREATMENT: CPT

## 2025-05-18 PROCEDURE — 83605 ASSAY OF LACTIC ACID: CPT

## 2025-05-18 PROCEDURE — 80202 ASSAY OF VANCOMYCIN: CPT

## 2025-05-18 PROCEDURE — 99233 SBSQ HOSP IP/OBS HIGH 50: CPT | Performed by: INTERNAL MEDICINE

## 2025-05-18 PROCEDURE — 87205 SMEAR GRAM STAIN: CPT

## 2025-05-18 PROCEDURE — 6360000002 HC RX W HCPCS

## 2025-05-18 PROCEDURE — 83735 ASSAY OF MAGNESIUM: CPT

## 2025-05-18 PROCEDURE — 82805 BLOOD GASES W/O2 SATURATION: CPT

## 2025-05-18 PROCEDURE — 85018 HEMOGLOBIN: CPT

## 2025-05-18 PROCEDURE — 94660 CPAP INITIATION&MGMT: CPT

## 2025-05-18 PROCEDURE — 2580000003 HC RX 258

## 2025-05-18 PROCEDURE — P9047 ALBUMIN (HUMAN), 25%, 50ML: HCPCS

## 2025-05-18 PROCEDURE — 82010 KETONE BODYS QUAN: CPT

## 2025-05-18 PROCEDURE — 32557 INSERT CATH PLEURA W/ IMAGE: CPT | Performed by: INTERNAL MEDICINE

## 2025-05-18 PROCEDURE — 87102 FUNGUS ISOLATION CULTURE: CPT

## 2025-05-18 PROCEDURE — 2000000000 HC ICU R&B

## 2025-05-18 PROCEDURE — 80053 COMPREHEN METABOLIC PANEL: CPT

## 2025-05-18 PROCEDURE — 6370000000 HC RX 637 (ALT 250 FOR IP)

## 2025-05-18 PROCEDURE — 6370000000 HC RX 637 (ALT 250 FOR IP): Performed by: INTERNAL MEDICINE

## 2025-05-18 PROCEDURE — 85025 COMPLETE CBC W/AUTO DIFF WBC: CPT

## 2025-05-18 PROCEDURE — 89051 BODY FLUID CELL COUNT: CPT

## 2025-05-18 PROCEDURE — 83036 HEMOGLOBIN GLYCOSYLATED A1C: CPT

## 2025-05-18 PROCEDURE — 82945 GLUCOSE OTHER FLUID: CPT

## 2025-05-18 PROCEDURE — 84100 ASSAY OF PHOSPHORUS: CPT

## 2025-05-18 PROCEDURE — 87070 CULTURE OTHR SPECIMN AEROBIC: CPT

## 2025-05-18 PROCEDURE — 2500000003 HC RX 250 WO HCPCS

## 2025-05-18 PROCEDURE — 84157 ASSAY OF PROTEIN OTHER: CPT

## 2025-05-18 PROCEDURE — 83880 ASSAY OF NATRIURETIC PEPTIDE: CPT

## 2025-05-18 PROCEDURE — 82533 TOTAL CORTISOL: CPT

## 2025-05-18 PROCEDURE — 83615 LACTATE (LD) (LDH) ENZYME: CPT

## 2025-05-18 PROCEDURE — 0W9930Z DRAINAGE OF RIGHT PLEURAL CAVITY WITH DRAINAGE DEVICE, PERCUTANEOUS APPROACH: ICD-10-PCS | Performed by: INTERNAL MEDICINE

## 2025-05-18 PROCEDURE — 6360000002 HC RX W HCPCS: Performed by: INTERNAL MEDICINE

## 2025-05-18 PROCEDURE — 87206 SMEAR FLUORESCENT/ACID STAI: CPT

## 2025-05-18 PROCEDURE — 82962 GLUCOSE BLOOD TEST: CPT

## 2025-05-18 PROCEDURE — 88112 CYTOPATH CELL ENHANCE TECH: CPT

## 2025-05-18 PROCEDURE — 87015 SPECIMEN INFECT AGNT CONCNTJ: CPT

## 2025-05-18 PROCEDURE — 87116 MYCOBACTERIA CULTURE: CPT

## 2025-05-18 PROCEDURE — 85014 HEMATOCRIT: CPT

## 2025-05-18 PROCEDURE — 71045 X-RAY EXAM CHEST 1 VIEW: CPT

## 2025-05-18 PROCEDURE — 2700000000 HC OXYGEN THERAPY PER DAY

## 2025-05-18 PROCEDURE — 88305 TISSUE EXAM BY PATHOLOGIST: CPT

## 2025-05-18 PROCEDURE — 32555 ASPIRATE PLEURA W/ IMAGING: CPT

## 2025-05-18 PROCEDURE — 37799 UNLISTED PX VASCULAR SURGERY: CPT

## 2025-05-18 PROCEDURE — 2580000003 HC RX 258: Performed by: INTERNAL MEDICINE

## 2025-05-18 PROCEDURE — 32551 INSERTION OF CHEST TUBE: CPT

## 2025-05-18 PROCEDURE — 80048 BASIC METABOLIC PNL TOTAL CA: CPT

## 2025-05-18 PROCEDURE — P9047 ALBUMIN (HUMAN), 25%, 50ML: HCPCS | Performed by: INTERNAL MEDICINE

## 2025-05-18 PROCEDURE — 2500000003 HC RX 250 WO HCPCS: Performed by: INTERNAL MEDICINE

## 2025-05-18 PROCEDURE — 99291 CRITICAL CARE FIRST HOUR: CPT | Performed by: INTERNAL MEDICINE

## 2025-05-18 PROCEDURE — 83986 ASSAY PH BODY FLUID NOS: CPT

## 2025-05-18 RX ORDER — ALBUMIN (HUMAN) 12.5 G/50ML
25 SOLUTION INTRAVENOUS ONCE
Status: COMPLETED | OUTPATIENT
Start: 2025-05-18 | End: 2025-05-18

## 2025-05-18 RX ORDER — DEXTROSE MONOHYDRATE AND SODIUM CHLORIDE 5; .45 G/100ML; G/100ML
INJECTION, SOLUTION INTRAVENOUS CONTINUOUS
Status: DISCONTINUED | OUTPATIENT
Start: 2025-05-18 | End: 2025-05-21

## 2025-05-18 RX ORDER — MAGNESIUM SULFATE IN WATER 40 MG/ML
2000 INJECTION, SOLUTION INTRAVENOUS PRN
Status: DISCONTINUED | OUTPATIENT
Start: 2025-05-18 | End: 2025-05-29 | Stop reason: HOSPADM

## 2025-05-18 RX ORDER — LIDOCAINE 4 G/G
1 PATCH TOPICAL DAILY
Status: DISCONTINUED | OUTPATIENT
Start: 2025-05-18 | End: 2025-05-29 | Stop reason: HOSPADM

## 2025-05-18 RX ORDER — INDOMETHACIN 25 MG/1
150 CAPSULE ORAL ONCE
Status: DISCONTINUED | OUTPATIENT
Start: 2025-05-18 | End: 2025-05-18

## 2025-05-18 RX ORDER — SODIUM CHLORIDE 9 MG/ML
INJECTION, SOLUTION INTRAVENOUS
Status: COMPLETED
Start: 2025-05-18 | End: 2025-05-18

## 2025-05-18 RX ORDER — POTASSIUM CHLORIDE 7.45 MG/ML
10 INJECTION INTRAVENOUS PRN
Status: DISPENSED | OUTPATIENT
Start: 2025-05-18 | End: 2025-05-21

## 2025-05-18 RX ORDER — DEXTROSE MONOHYDRATE 100 MG/ML
INJECTION, SOLUTION INTRAVENOUS CONTINUOUS PRN
Status: DISCONTINUED | OUTPATIENT
Start: 2025-05-18 | End: 2025-05-29 | Stop reason: HOSPADM

## 2025-05-18 RX ORDER — VASOPRESSIN 20 [USP'U]/ML
INJECTION, SOLUTION INTRAVENOUS
Status: COMPLETED
Start: 2025-05-18 | End: 2025-05-18

## 2025-05-18 RX ORDER — DEXTROSE AND SODIUM CHLORIDE 10; .45 G/100ML; G/100ML
INJECTION, SOLUTION INTRAVENOUS CONTINUOUS
Status: DISCONTINUED | OUTPATIENT
Start: 2025-05-18 | End: 2025-05-18

## 2025-05-18 RX ORDER — MIDODRINE HYDROCHLORIDE 5 MG/1
20 TABLET ORAL
Status: DISCONTINUED | OUTPATIENT
Start: 2025-05-18 | End: 2025-05-29 | Stop reason: HOSPADM

## 2025-05-18 RX ORDER — SODIUM CHLORIDE 9 MG/ML
INJECTION, SOLUTION INTRAVENOUS CONTINUOUS
Status: DISCONTINUED | OUTPATIENT
Start: 2025-05-18 | End: 2025-05-18

## 2025-05-18 RX ADMIN — HYDROMORPHONE HYDROCHLORIDE 0.5 MG: 1 INJECTION, SOLUTION INTRAMUSCULAR; INTRAVENOUS; SUBCUTANEOUS at 13:02

## 2025-05-18 RX ADMIN — ATORVASTATIN CALCIUM 40 MG: 40 TABLET, FILM COATED ORAL at 07:21

## 2025-05-18 RX ADMIN — ACETAMINOPHEN 650 MG: 325 TABLET ORAL at 05:19

## 2025-05-18 RX ADMIN — SALINE NASAL SPRAY 1 SPRAY: 1.5 SOLUTION NASAL at 20:04

## 2025-05-18 RX ADMIN — PIPERACILLIN AND TAZOBACTAM 4500 MG: 4; .5 INJECTION, POWDER, LYOPHILIZED, FOR SOLUTION INTRAVENOUS at 14:18

## 2025-05-18 RX ADMIN — ONDANSETRON 4 MG: 2 INJECTION, SOLUTION INTRAMUSCULAR; INTRAVENOUS at 07:30

## 2025-05-18 RX ADMIN — HYDROCORTISONE SODIUM SUCCINATE 100 MG: 100 INJECTION INTRAMUSCULAR; INTRAVENOUS at 05:08

## 2025-05-18 RX ADMIN — DEXTROSE AND SODIUM CHLORIDE: 5; 450 INJECTION, SOLUTION INTRAVENOUS at 13:55

## 2025-05-18 RX ADMIN — SERTRALINE 50 MG: 50 TABLET, FILM COATED ORAL at 07:21

## 2025-05-18 RX ADMIN — SODIUM CHLORIDE 3.2 UNITS/HR: 9 INJECTION, SOLUTION INTRAVENOUS at 13:52

## 2025-05-18 RX ADMIN — SODIUM CHLORIDE 100 ML: 9 INJECTION, SOLUTION INTRAVENOUS at 08:08

## 2025-05-18 RX ADMIN — MIDODRINE HYDROCHLORIDE 20 MG: 10 TABLET ORAL at 11:11

## 2025-05-18 RX ADMIN — Medication 35 MCG/MIN: at 03:08

## 2025-05-18 RX ADMIN — VASOPRESSIN 0.03 UNITS/MIN: 20 INJECTION INTRAVENOUS at 08:07

## 2025-05-18 RX ADMIN — POTASSIUM CHLORIDE 10 MEQ: 7.46 INJECTION, SOLUTION INTRAVENOUS at 21:11

## 2025-05-18 RX ADMIN — SODIUM BICARBONATE: 84 INJECTION, SOLUTION INTRAVENOUS at 11:32

## 2025-05-18 RX ADMIN — SODIUM CHLORIDE, PRESERVATIVE FREE 10 ML: 5 INJECTION INTRAVENOUS at 20:04

## 2025-05-18 RX ADMIN — ALBUMIN (HUMAN) 25 G: 0.25 INJECTION, SOLUTION INTRAVENOUS at 15:13

## 2025-05-18 RX ADMIN — TAMSULOSIN HYDROCHLORIDE 0.4 MG: 0.4 CAPSULE ORAL at 07:21

## 2025-05-18 RX ADMIN — POTASSIUM CHLORIDE 10 MEQ: 7.46 INJECTION, SOLUTION INTRAVENOUS at 20:08

## 2025-05-18 RX ADMIN — PIPERACILLIN AND TAZOBACTAM 4500 MG: 4; .5 INJECTION, POWDER, LYOPHILIZED, FOR SOLUTION INTRAVENOUS at 02:08

## 2025-05-18 RX ADMIN — ACETAMINOPHEN 650 MG: 325 TABLET ORAL at 11:27

## 2025-05-18 RX ADMIN — SODIUM CHLORIDE, PRESERVATIVE FREE 10 ML: 5 INJECTION INTRAVENOUS at 07:21

## 2025-05-18 RX ADMIN — MIDODRINE HYDROCHLORIDE 10 MG: 10 TABLET ORAL at 07:21

## 2025-05-18 RX ADMIN — SALINE NASAL SPRAY 1 SPRAY: 1.5 SOLUTION NASAL at 07:21

## 2025-05-18 RX ADMIN — LEVOTHYROXINE SODIUM 50 MCG: 0.05 TABLET ORAL at 05:57

## 2025-05-18 RX ADMIN — HYDROCORTISONE SODIUM SUCCINATE 100 MG: 100 INJECTION INTRAMUSCULAR; INTRAVENOUS at 11:14

## 2025-05-18 RX ADMIN — ALBUMIN (HUMAN) 25 G: 0.25 INJECTION, SOLUTION INTRAVENOUS at 05:16

## 2025-05-18 RX ADMIN — LEVOTHYROXINE SODIUM 100 MCG: 0.1 TABLET ORAL at 05:58

## 2025-05-18 RX ADMIN — SEVELAMER CARBONATE FOR ORAL SUSPENSION 0.8 G: 800 POWDER, FOR SUSPENSION ORAL at 10:00

## 2025-05-18 RX ADMIN — Medication 20 MCG/MIN: at 15:49

## 2025-05-18 RX ADMIN — IPRATROPIUM BROMIDE AND ALBUTEROL SULFATE 1 DOSE: 2.5; .5 SOLUTION RESPIRATORY (INHALATION) at 08:13

## 2025-05-18 RX ADMIN — HYDROMORPHONE HYDROCHLORIDE 0.25 MG: 1 INJECTION, SOLUTION INTRAMUSCULAR; INTRAVENOUS; SUBCUTANEOUS at 22:22

## 2025-05-18 RX ADMIN — MIDODRINE HYDROCHLORIDE 20 MG: 10 TABLET ORAL at 16:56

## 2025-05-18 RX ADMIN — IPRATROPIUM BROMIDE AND ALBUTEROL SULFATE 1 DOSE: 2.5; .5 SOLUTION RESPIRATORY (INHALATION) at 15:19

## 2025-05-18 RX ADMIN — IPRATROPIUM BROMIDE AND ALBUTEROL SULFATE 1 DOSE: 2.5; .5 SOLUTION RESPIRATORY (INHALATION) at 21:06

## 2025-05-18 RX ADMIN — HYDROCORTISONE SODIUM SUCCINATE 100 MG: 100 INJECTION INTRAMUSCULAR; INTRAVENOUS at 20:04

## 2025-05-18 RX ADMIN — Medication 2000 UNITS: at 07:21

## 2025-05-18 RX ADMIN — VASOPRESSIN 20 UNITS: 20 INJECTION INTRAVENOUS at 08:09

## 2025-05-18 ASSESSMENT — PAIN DESCRIPTION - LOCATION
LOCATION: BACK;RIB CAGE
LOCATION: BACK
LOCATION: RIB CAGE;BACK
LOCATION: BACK
LOCATION: BACK;ABDOMEN
LOCATION: BACK

## 2025-05-18 ASSESSMENT — PAIN DESCRIPTION - DESCRIPTORS
DESCRIPTORS: BURNING;DISCOMFORT
DESCRIPTORS: ACHING;DISCOMFORT
DESCRIPTORS: DISCOMFORT;SHARP;SORE
DESCRIPTORS: ACHING;DISCOMFORT;THROBBING
DESCRIPTORS: ACHING;DISCOMFORT;THROBBING

## 2025-05-18 ASSESSMENT — PAIN - FUNCTIONAL ASSESSMENT
PAIN_FUNCTIONAL_ASSESSMENT: PREVENTS OR INTERFERES SOME ACTIVE ACTIVITIES AND ADLS
PAIN_FUNCTIONAL_ASSESSMENT: PREVENTS OR INTERFERES SOME ACTIVE ACTIVITIES AND ADLS
PAIN_FUNCTIONAL_ASSESSMENT: ACTIVITIES ARE NOT PREVENTED

## 2025-05-18 ASSESSMENT — PAIN SCALES - GENERAL
PAINLEVEL_OUTOF10: 5
PAINLEVEL_OUTOF10: 5
PAINLEVEL_OUTOF10: 10
PAINLEVEL_OUTOF10: 4
PAINLEVEL_OUTOF10: 4
PAINLEVEL_OUTOF10: 8
PAINLEVEL_OUTOF10: 5

## 2025-05-18 ASSESSMENT — PAIN DESCRIPTION - ORIENTATION
ORIENTATION: MID;LOWER
ORIENTATION: LOWER
ORIENTATION: RIGHT;LOWER

## 2025-05-18 ASSESSMENT — PAIN SCALES - WONG BAKER: WONGBAKER_NUMERICALRESPONSE: NO HURT

## 2025-05-18 NOTE — PROGRESS NOTES
Pharmacy Consultation Note  (Antibiotic Dosing and Monitoring)    Initial consult date: 5/16/25  Consulting physician/provider: Chelsey  Drug: Vancomycin  Indication: Pneumonia (CAP/Nosocomial)    Age/  Gender Height Weight IBW  Allergy Information   69 y.o./male 170.2 cm (5' 7\") 99.5 kg (219 lb 5.7 oz)     Ideal body weight: 66.1 kg (145 lb 11.6 oz)  Adjusted ideal body weight: 78.9 kg (174 lb 0.5 oz)   Patient has no known allergies.      Renal Function: started HD: 5/16, 5/17,     Recent Labs     05/16/25  0405 05/17/25  0408 05/18/25  0411   * 87* 52*   CREATININE 4.3* 2.8* 2.0*       Intake/Output Summary (Last 24 hours) at 5/18/2025 0837  Last data filed at 5/18/2025 0659  Gross per 24 hour   Intake 2050.49 ml   Output 1765 ml   Net 285.49 ml       Vancomycin Monitoring:  Trough:  No results for input(s): \"VANCOTROUGH\" in the last 72 hours.  Random:    Recent Labs     05/17/25  0408 05/18/25  0411   VANCORANDOM 33.0 23.5       Vancomycin Administration Times:  Recent vancomycin administrations                     vancomycin (VANCOCIN) 2500 mg in sodium chloride 0.9 % 500 mL IVPB (mg) 2,500 mg New Bag 05/16/25 0803        Assessment:  Patient is a 69 y.o. male who has been initiated on vancomycin for PNA (CAP/nosocomial).  Started IHD on 5/16/2025.  Estimated Creatinine Clearance: 38 mL/min (A) (based on SCr of 2 mg/dL (H)).  To dose vancomycin, pharmacy will be utilizing dosing based off of levels because of patient's renal impairment/insufficiency.  5/17: started HD 5/16, ordered for today also.  Received vanco 2500 mg x1 on 5/16 @ 0803 (before HD session). Cx's negative to date.  5/18: vanc level > 20 mCg/mL this morning.  Still on 2 pressors/midodrine.  Await Nephrology HD orders (HD today?).    Plan:  Re-check random vanc level 5/19 AM.   Re-dose when < 20 mCg/mL if vanco continues.    Santana Greenfield PharmD  5/18/2025  8:37 AM   or

## 2025-05-18 NOTE — PLAN OF CARE
Problem: Safety - Adult  Goal: Free from fall injury  5/18/2025 1011 by Jennifer Roberts RN  Outcome: Progressing  5/17/2025 2112 by Khalida Abraham RN  Outcome: Progressing  Flowsheets (Taken 5/17/2025 2100)  Free From Fall Injury: Instruct family/caregiver on patient safety     Problem: Skin/Tissue Integrity  Goal: Skin integrity remains intact  Description: 1.  Monitor for areas of redness and/or skin breakdown2.  Assess vascular access sites hourly3.  Every 4-6 hours minimum:  Change oxygen saturation probe site4.  Every 4-6 hours:  If on nasal continuous positive airway pressure, respiratory therapy assess nares and determine need for appliance change or resting period  5/18/2025 1011 by Jennifer Roberts RN  Outcome: Progressing  5/17/2025 2112 by Khalida Abraham RN  Outcome: Progressing  Flowsheets  Taken 5/17/2025 2100  Skin Integrity Remains Intact:   Monitor for areas of redness and/or skin breakdown   Turn and reposition as indicated   Assess need for specialty bed   Positioning devices   Pressure redistribution bed/mattress (bed type)   Check visual cues for pain  Taken 5/17/2025 2000  Skin Integrity Remains Intact:   Monitor for areas of redness and/or skin breakdown   Turn and reposition as indicated   Assess need for specialty bed   Positioning devices   Pressure redistribution bed/mattress (bed type)   Check visual cues for pain     Problem: ABCDS Injury Assessment  Goal: Absence of physical injury  5/18/2025 1011 by Jennifer Roberts RN  Outcome: Progressing  5/17/2025 2112 by Khalida Abraham RN  Outcome: Progressing  Flowsheets (Taken 5/17/2025 2100)  Absence of Physical Injury: Implement safety measures based on patient assessment     Problem: Chronic Conditions and Co-morbidities  Goal: Patient's chronic conditions and co-morbidity symptoms are monitored and maintained or improved  5/18/2025 1011 by Jennifer Roberts RN  Outcome: Not Progressing  5/17/2025 2112 by Khalida Abraham RN  Outcome:

## 2025-05-18 NOTE — PROGRESS NOTES
Patient requesting a referral to see Endocrinology, referral has been pended please review and sign if appropriate. [facility]  Internal Medicine Residency Program  Progress Note - House Team 2    Patient:  Wander Rocha 69 y.o. male MRN: [id number]     Date of Service: 5/19/2025     CC: shortness of breath    Days since admission: 5    Subjective     Overnight events:   Pigtail catheter placement  BHB > 4.50 - DKA protocol started    He was seen and examined at bedside this morning.  He was asleep, arousable to name being called. He is on Venturi mask. No recurrence of nose bleeding. He is down to levophed of 9. He remains on insulin drip. No note of any bowel movements overnight.     Objective     VS: BP (!) 103/46   Pulse 64   Temp (!) 96.1 °F (35.6 °C) (Bladder)   Resp 13   Ht 1.702 m (5' 7\")   Wt 98.5 kg (217 lb 2.5 oz)   SpO2 100%   BMI 34.01 kg/m²   ABP (Arterial line BP): 91/47  ABP Mean (Arterial Line Mean): (!) 63 mmHg  I & O - 24hr:   Intake/Output Summary (Last 24 hours) at 5/19/2025 0742  Last data filed at 5/19/2025 0600  Gross per 24 hour   Intake 1841.91 ml   Output 2415 ml   Net -573.09 ml     Net IO Since Admission: 499.03 mL [05/19/25 0742]    Physical Exam:  General Appearance: in mild distress, on Venturi mask  HEENT: Normocephalic, atraumatic  Neck: midline trachea  Lung: (+) Crackles bilaterally, (+) chest tube on the right lung  Heart: regular rate and rhythm, no murmur  Abdomen: soft, (+) distended abdomen  Extremities: no cyanosis or edema (+) arterial line, R brachial  Musculokeletal: No joint swelling  Neurologic: Mental status: awake, alert    Interventions:  Procedures: None  Airway: Venturi mask    Medications     Continuous Infusions:   dextrose      insulin 3.6 Units/hr (05/19/25 0700)    dextrose 5 % and 0.45 % NaCl 100 mL/hr at 05/19/25 0019    VASOpressin 20 Units in sodium chloride 0.9 % 100 mL IVPB ([id number]) Stopped (05/18/25 1653)    norepinephrine 9 mcg/min (05/19/25 0308)    sodium chloride      sodium chloride      dextrose      sodium chloride       Scheduled

## 2025-05-18 NOTE — PROGRESS NOTES
05/17/25 1958   NIV Type   NIV Started/Stopped On  (pt found on at this time)   Equipment Type V60   Mode Bilevel   Mask Type Full face mask   Mask Size Medium   Assessment   Pulse 81   Respirations 19   SpO2 100 %   Level of Consciousness 1   Comfort Level Good   Using Accessory Muscles No   Mask Compliance Good   Skin Assessment Clean, dry, & intact   Skin Protection for O2 Device Yes   Orientation Middle   Location Nose   Intervention(s) Skin Barrier   Settings/Measurements   PIP Observed 15 cm H20   IPAP 14 cmH20   CPAP/EPAP 5 cmH2O   Vt (Measured) 532 mL   Rate Ordered 16   Insp Rise Time (%) 3 %   FiO2  50 %   I Time/ I Time % 0.8 s   Minute Volume (L/min) 10.7 Liters   Mask Leak (lpm) 43 lpm   Humidity 30   Patient's Home Machine No   Alarm Settings   Alarms On Y

## 2025-05-18 NOTE — PROGRESS NOTES
Date : 2025  Time:  1300  Patient Name: Wander Rocha  Patient : 1955  Procedure: pigtail catheter  Verify Correct Patient by two identifiers:  Yes  Verbal agreement by all team members on the procedure to be done?  Yes  Correct position?   Yes  Correct site?   Yes  Correct site and side of surgery as evidenced by markings made?    N/A  Correct implants, imaging data and/or special equipment available?    Yes

## 2025-05-18 NOTE — PROGRESS NOTES
1 container of pleural fluid from chest tube walk walked down and handed to . Container number [id number].

## 2025-05-18 NOTE — PROGRESS NOTES
Attending Physician Attestation: Dr. Teo Serrano    Thank you very much for allowing me to see this patient in consultation and follow up.    I personally saw, examined and provided care for the patient. Radiographs, labs and medication list were reviewed by me independently. I spoke with bedside nursing, respiratory therapists and consultants. Critical care services and times documented are independent of procedures and multidisciplinary rounds with Residents. Additionally comprehensive, multidisciplinary rounds were conducted with the MICU team. The case was discussed in detail and plans for care were established. Review of Residents documentation was conducted and revisions were made as appropriate. I agree with the the above documented information.     Current Facility-Administered Medications   Medication Dose Route Frequency Provider Last Rate Last Admin    midodrine (PROAMATINE) tablet 20 mg  20 mg Oral TID  Benjie Melvin MD        [Held by provider] lactulose (CHRONULAC) 10 GM/15ML solution 20 g  20 g Oral TID Elijah Rios MD        [Held by provider] rifAXIMin (XIFAXAN) tablet 400 mg  400 mg Oral TID Elijah Rios MD        VASOpressin 20 Units in sodium chloride 0.9 % 100 mL IVPB ([id number])  0.01-0.03 Units/min IntraVENous Continuous Elijah Rios MD 9 mL/hr at 05/18/25 0952 0.03 Units/min at 05/18/25 0952    norepinephrine (LEVOPHED) 16 mg in sodium chloride 0.9 % 250 mL infusion (premix)  1-100 mcg/min IntraVENous Continuous Teo Serrano MD 18.8 mL/hr at 05/18/25 0952 20 mcg/min at 05/18/25 0952    vancomycin (VANCOCIN) intermittent dosing (placeholder)   Other RX Placeholder Santana Greenfield, JUAN        hydrocortisone sodium succinate PF (SOLU-CORTEF) injection 100 mg  100 mg IntraVENous Q8H Elijah Rios MD   100 mg at 05/18/25 0508    HYDROmorphone (DILAUDID) injection 0.25 mg  0.25 mg IntraVENous Q4H PRN Elijah Rios MD   0.25 mg at 05/17/25 2215    0.9 % sodium

## 2025-05-18 NOTE — PROGRESS NOTES
[facility]   Department of Internal Medicine   Internal Medicine Residency  MICU Progress Note    Patient:  Wander Rocha 69 y.o. male   MRN: [id number]      [facility]    Admission date: 5/14/2025  2:21 PM ; Hospital day: 4   ICU day: 2d 11h      Allergy: Patient has no known allergies.    Subjective     Patient was seen and examined this morning at bedside. He is on 9 L oxygen via HFNC. He is alert and oriented x3. He denies chest pain, SOB, N/V. He only complained for chronic back and shoulder pain.     Objective       I & O - 24hr:    Intake/Output Summary (Last 24 hours) at 5/18/2025 0729  Last data filed at 5/18/2025 0659  Gross per 24 hour   Intake 2290.49 ml   Output 1890 ml   Net 400.49 ml     Net IO Since Admission: 1,092.12 mL [05/18/25 0729]    Physical Exam  BP (!) 138/48   Pulse 84   Temp 98.6 °F (37 °C) (Bladder)   Resp 17   Ht 1.702 m (5' 7\")   Wt 98.2 kg (216 lb 7.9 oz)   SpO2 93%   BMI 33.91 kg/m²   Ideal body weight: 66.1 kg (145 lb 11.6 oz)    General Appearance: alert, appears stated age, cooperative, and no distress  HEENT:  dissolvable nasal packing in the left nare, dry blood  Lung: clear to auscultation bilaterally  Heart: S1, S2 normal and sinus rhythm  Abdomen:  distended, no tenderness, normal BS  Extremities:   no edema, anterior leg erythema and tenderness  Neurologic: alert and oriented x3      Medications     Continuous Infusions:   VASOpressin 20 Units in sodium chloride 0.9 % 100 mL IVPB ([id number]) 0.03 Units/min (05/18/25 0659)    norepinephrine 35 mcg/min (05/18/25 0659)    sodium chloride      sodium chloride      dextrose      sodium chloride       Scheduled Meds:   [Held by provider] lactulose  20 g Oral TID    [Held by provider] rifAXIMin  400 mg Oral TID    vancomycin (VANCOCIN) intermittent dosing (placeholder)   Other RX Placeholder    hydrocortisone sodium succinate PF  100 mg IntraVENous Q8H    piperacillin-tazobactam  4,500 mg IntraVENous

## 2025-05-18 NOTE — PLAN OF CARE
Problem: Safety - Adult  Goal: Free from fall injury  5/17/2025 2112 by Khalida Abraham RN  Outcome: Progressing     Problem: Chronic Conditions and Co-morbidities  Goal: Patient's chronic conditions and co-morbidity symptoms are monitored and maintained or improved  5/17/2025 2112 by Khalida Abraham RN  Outcome: Progressing     Problem: Discharge Planning  Goal: Discharge to home or other facility with appropriate resources  5/17/2025 2112 by Khalida Abraham RN  Outcome: Progressing     Problem: Pain  Goal: Verbalizes/displays adequate comfort level or baseline comfort level  5/17/2025 2112 by Khalida Abraham RN  Outcome: Progressing    Problem: ABCDS Injury Assessment  Goal: Absence of physical injury  5/17/2025 2112 by Khalida Abraham RN  Outcome: Progressing    Problem: Skin/Tissue Integrity  Goal: Skin integrity remains intact  Description: 1.  Monitor for areas of redness and/or skin breakdown2.  Assess vascular access sites hourly3.  Every 4-6 hours minimum:  Change oxygen saturation probe site4.  Every 4-6 hours:  If on nasal continuous positive airway pressure, respiratory therapy assess nares and determine need for appliance change or resting period  5/17/2025 2112 by Khalida Abraham, RN  Outcome: Progressing

## 2025-05-18 NOTE — PROCEDURES
[facility]    PULMONARY/CRITICAL CARE PROCEDURE NOTE    RIGHT CHEST TUBE PROCEDURE NOTE    Patient: Wander Rocha  MRN: [id number]  : 1955  Date: 2025    Time: 1:22 PM    Laboratory Work:  Lab Results   Component Value Date    INR 2.0 2025    INR 2.6 2024    INR 2.1 2022    PROTIME 22.3 (H) 2025    PROTIME 28.6 (H) 2024    PROTIME 22.9 (H) 2022     Lab Results   Component Value Date    WBC 19.7 (H) 2025    HGB 8.4 (L) 2025    HCT 27.6 (L) 2025    MCV 98.6 2025     2025    LYMPHOPCT 6 (L) 2025    RBC 2.86 (L) 2025    MCH 30.1 2025    MCHC 30.5 (L) 2025    RDW 17.9 (H) 2025    NEUTOPHILPCT 90 (H) 2025    MONOPCT 3 2025    EOSPCT 0 2025    BASOPCT 0 2025    NEUTROABS 17.82 (H) 2025    LYMPHSABS 1.20 (L) 2025    MONOSABS 0.51 2025    EOSABS 0.00 (L) 2025    BASOSABS 0.00 2025     Lab Results   Component Value Date/Time     2025 04:11 AM    K 4.7 2025 04:11 AM    K 5.1 2023 05:35 AM    CL 95 2025 04:11 AM    CO2 17 2025 04:11 AM    BUN 52 2025 04:11 AM    CREATININE 2.0 2025 04:11 AM    GLUCOSE 236 2025 04:11 AM    GLUCOSE 133 2012 08:43 AM    CALCIUM 8.1 2025 04:11 AM      Attending Physician: Dr. Serrano    Assistant(s): MICU Staff, Ultrasound Department     Pre-Operative Diagnosis: Pleural Effusion, Right    Operation/Procedure:   Right 8 Fr 35 cm Pigtail Catheter Placement   Pleural Drainage with Insertion of Indwelling Catheter with Image Guidance     Post-Operative Diagnosis: Pleural Effusion, Right    Anesthesia: Local with 1% Lidocaine     Indications: Pleural Effusion, Right    Purpose: Therapeutic    Consent: Consent was obtained prior to procedure. Indications, risks, benefits were explained at length.  We explained the major steps of the procedure, the necessity

## 2025-05-18 NOTE — PROCEDURES
PROCEDURE NOTE  Date: 5/18/2025   Name: Wander Bradleyherminiojimena  YOB: 1955    Procedures  PLEASE CALL CT 3010 WHEN PATIENT IS ABLE TO COME FOR SCANS

## 2025-05-18 NOTE — PROGRESS NOTES
Department of Internal Medicine  Nephrology Attending Consult Note      Events reviewed.    SUBJECTIVE: We are following Mr. Wander Rocha for CKD.  Reports no new complaints.    PHYSICAL EXAM:      Vitals:    VITALS:  BP (!) 122/46   Pulse 77   Temp 97.5 °F (36.4 °C) (Bladder)   Resp 24   Ht 1.702 m (5' 7\")   Wt 98.2 kg (216 lb 7.9 oz)   SpO2 100%   BMI 33.91 kg/m²   24HR INTAKE/OUTPUT:    Intake/Output Summary (Last 24 hours) at 5/18/2025 1014  Last data filed at 5/18/2025 1000  Gross per 24 hour   Intake 2154.27 ml   Output 1755 ml   Net 399.27 ml       Constitutional:  Awake, alert, oriented, in NAD  HEENT:  PERRLA, normocephalic, atraumatic  Respiratory:  wheezes  Cardiovascular/Edema:  RRR, normal S1, normal S2, + edema  Gastrointestinal: Distended  Neurologic:  Nonfocal  Skin:  warm, dry, no rashes, no lesions    Scheduled Meds:   midodrine  20 mg Oral TID WC    [Held by provider] lactulose  20 g Oral TID    [Held by provider] rifAXIMin  400 mg Oral TID    vancomycin (VANCOCIN) intermittent dosing (placeholder)   Other RX Placeholder    hydrocortisone sodium succinate PF  100 mg IntraVENous Q8H    piperacillin-tazobactam  4,500 mg IntraVENous Q12H    ipratropium 0.5 mg-albuterol 2.5 mg  1 Dose Inhalation Q4H WA RT    sevelamer  0.8 g Oral TID     sodium chloride  1 spray Each Nostril TID    [Held by provider] metoprolol succinate  12.5 mg Oral Daily    epoetin lay-epbx  3,000 Units SubCUTAneous Once per day on Tuesday Thursday Saturday    [Held by provider] ferric gluconate (FERRLECIT) 125 mg in sodium chloride 0.9 % 100 mL IVPB  125 mg IntraVENous Daily    sodium chloride flush  5-40 mL IntraVENous 2 times per day    [Held by provider] acetaZOLAMIDE  250 mg Oral Daily    [Held by provider] allopurinol  200 mg Oral Daily    [Held by provider] apixaban  5 mg Oral BID    [Held by provider] aspirin  81 mg Oral Daily    atorvastatin  40 mg Oral Daily    [Held by provider] bumetanide  2 mg Oral BID     04:11 AM     Phosphorus:    Lab Results   Component Value Date/Time    PHOS 6.2 05/18/2025 04:11 AM     Radiology Review:    XR CHEST PORTABLE 5/15/25    IMPRESSION:  No significant interval change. Multifocal bilateral infiltrates with  significant size right and small left pleural effusion.    US RETROPERITONEAL COMPLETE 5/15/25    IMPRESSION:  1. Mildly increased echogenicity of the renal cortices most suggestive of  intrinsic renal disease. No hydronephrosis.  2. Splenomegaly.  3. Perihepatic ascites.  4. Right pleural effusion.      BRIEF SUMMARY OF INITIAL CONSULT:    Briefly, Wander Rocha is a 69-year-old male known to us, past medical history CKD stage 3b probably 2/2 nephrosclerosis and previous episode of ischemic ATN requiring temporary HD in May 2017, recurrent episode of ischemic ATN in March 2022 also requiring HD x2 with fair recovery of renal function, baseline creatinine 2.0 mg/dL, HTN, DM type II, A. fib on apixaban, HFpEF 50-55%, NIKO, pacemaker placement, hypothyroidism, hyperlipidemia, who was recently discharged and readmitted on 5/14/2025 for abnormal labs.  Lab work showed sodium 130, potassium 5.5 mmol/L, , creatinine 4.9 mL grams per deciliter, proBNP 3981, reason for this consultation.  Chest x-ray shows bilateral pleural effusions.  Significant home medications include Jardiance, acetazolamide, Bumex, lisinopril and metoprolol.    Problems resolved:    Hypotonic hyponatremia with hypervolemia, 2/2 acute decompensated heart failure, resolved, sodium levels improved      IMPRESSION/RECOMMENDATIONS:      RADHA stage II on CKD likely 2/2 hemodynamically mediated acute decompensated heart failure, started on renal replacement therapy on 5/16/2025, had second treatment yesterday.  Anion gap has increased but seem to be mainly related to DKA.     CKD stage 3b probably 2/2 nephrosclerosis and previous recurrent episodes of ischemic ATN requiring dialysis,  baseline creatinine 2.0 mg/dL

## 2025-05-19 ENCOUNTER — APPOINTMENT (OUTPATIENT)
Dept: GENERAL RADIOLOGY | Age: 70
DRG: 871 | End: 2025-05-19
Payer: MEDICARE

## 2025-05-19 LAB
ALBUMIN SERPL-MCNC: 3.7 G/DL (ref 3.5–5.2)
ALP SERPL-CCNC: 61 U/L (ref 40–129)
ALT SERPL-CCNC: 22 U/L (ref 0–50)
ANION GAP SERPL CALCULATED.3IONS-SCNC: 14 MMOL/L (ref 7–16)
ANION GAP SERPL CALCULATED.3IONS-SCNC: 15 MMOL/L (ref 7–16)
AST SERPL-CCNC: 21 U/L (ref 0–50)
BASOPHILS # BLD: 0 K/UL (ref 0–0.2)
BASOPHILS NFR BLD: 0 % (ref 0–2)
BILIRUB SERPL-MCNC: 0.7 MG/DL (ref 0–1.2)
BUN SERPL-MCNC: 68 MG/DL (ref 8–23)
BUN SERPL-MCNC: 69 MG/DL (ref 8–23)
BUN SERPL-MCNC: 70 MG/DL (ref 8–23)
CALCIUM SERPL-MCNC: 7.7 MG/DL (ref 8.8–10.2)
CALCIUM SERPL-MCNC: 7.8 MG/DL (ref 8.8–10.2)
CALCIUM SERPL-MCNC: 7.9 MG/DL (ref 8.8–10.2)
CALCIUM SERPL-MCNC: 8 MG/DL (ref 8.8–10.2)
CALCIUM SERPL-MCNC: 8.1 MG/DL (ref 8.8–10.2)
CHLORIDE SERPL-SCNC: 100 MMOL/L (ref 98–107)
CHLORIDE SERPL-SCNC: 100 MMOL/L (ref 98–107)
CHLORIDE SERPL-SCNC: 98 MMOL/L (ref 98–107)
CHLORIDE SERPL-SCNC: 99 MMOL/L (ref 98–107)
CHLORIDE SERPL-SCNC: 99 MMOL/L (ref 98–107)
CO2 SERPL-SCNC: 22 MMOL/L (ref 22–29)
CO2 SERPL-SCNC: 23 MMOL/L (ref 22–29)
CO2 SERPL-SCNC: 23 MMOL/L (ref 22–29)
CREAT SERPL-MCNC: 2.7 MG/DL (ref 0.7–1.2)
CREAT SERPL-MCNC: 2.8 MG/DL (ref 0.7–1.2)
CREAT SERPL-MCNC: 2.8 MG/DL (ref 0.7–1.2)
CREAT SERPL-MCNC: 2.9 MG/DL (ref 0.7–1.2)
CREAT SERPL-MCNC: 2.9 MG/DL (ref 0.7–1.2)
DATE LAST DOSE: NORMAL
EOSINOPHIL # BLD: 0 K/UL (ref 0.05–0.5)
EOSINOPHILS RELATIVE PERCENT: 0 % (ref 0–6)
ERYTHROCYTE [DISTWIDTH] IN BLOOD BY AUTOMATED COUNT: 17.6 % (ref 11.5–15)
GFR, ESTIMATED: 22 ML/MIN/1.73M2
GFR, ESTIMATED: 22 ML/MIN/1.73M2
GFR, ESTIMATED: 23 ML/MIN/1.73M2
GFR, ESTIMATED: 23 ML/MIN/1.73M2
GFR, ESTIMATED: 24 ML/MIN/1.73M2
GLUCOSE BLD-MCNC: 138 MG/DL (ref 74–99)
GLUCOSE BLD-MCNC: 141 MG/DL (ref 74–99)
GLUCOSE BLD-MCNC: 141 MG/DL (ref 74–99)
GLUCOSE BLD-MCNC: 146 MG/DL (ref 74–99)
GLUCOSE BLD-MCNC: 152 MG/DL (ref 74–99)
GLUCOSE BLD-MCNC: 155 MG/DL (ref 74–99)
GLUCOSE BLD-MCNC: 155 MG/DL (ref 74–99)
GLUCOSE BLD-MCNC: 157 MG/DL (ref 74–99)
GLUCOSE BLD-MCNC: 158 MG/DL (ref 74–99)
GLUCOSE BLD-MCNC: 160 MG/DL (ref 74–99)
GLUCOSE BLD-MCNC: 162 MG/DL (ref 74–99)
GLUCOSE BLD-MCNC: 163 MG/DL (ref 74–99)
GLUCOSE BLD-MCNC: 170 MG/DL (ref 74–99)
GLUCOSE BLD-MCNC: 172 MG/DL (ref 74–99)
GLUCOSE BLD-MCNC: 172 MG/DL (ref 74–99)
GLUCOSE BLD-MCNC: 178 MG/DL (ref 74–99)
GLUCOSE BLD-MCNC: 182 MG/DL (ref 74–99)
GLUCOSE BLD-MCNC: 187 MG/DL (ref 74–99)
GLUCOSE BLD-MCNC: 196 MG/DL (ref 74–99)
GLUCOSE BLD-MCNC: 197 MG/DL (ref 74–99)
GLUCOSE BLD-MCNC: 202 MG/DL (ref 74–99)
GLUCOSE BLD-MCNC: 206 MG/DL (ref 74–99)
GLUCOSE FLD-MCNC: 221 MG/DL
GLUCOSE SERPL-MCNC: 159 MG/DL (ref 74–99)
GLUCOSE SERPL-MCNC: 165 MG/DL (ref 74–99)
GLUCOSE SERPL-MCNC: 179 MG/DL (ref 74–99)
GLUCOSE SERPL-MCNC: 205 MG/DL (ref 74–99)
GLUCOSE SERPL-MCNC: 220 MG/DL (ref 74–99)
HCT VFR BLD AUTO: 25.9 % (ref 37–54)
HGB BLD-MCNC: 8.2 G/DL (ref 12.5–16.5)
INR PPP: 1.6
LDH FLD L TO P-CCNC: 157 U/L
LYMPHOCYTES NFR BLD: 1.08 K/UL (ref 1.5–4)
LYMPHOCYTES RELATIVE PERCENT: 9 % (ref 20–42)
MAGNESIUM SERPL-MCNC: 2.1 MG/DL (ref 1.6–2.4)
MAGNESIUM SERPL-MCNC: 2.2 MG/DL (ref 1.6–2.4)
MAGNESIUM SERPL-MCNC: 2.3 MG/DL (ref 1.6–2.4)
MCH RBC QN AUTO: 30.4 PG (ref 26–35)
MCHC RBC AUTO-ENTMCNC: 31.7 G/DL (ref 32–34.5)
MCV RBC AUTO: 95.9 FL (ref 80–99.9)
MICROORGANISM SPEC CULT: NORMAL
MONOCYTES NFR BLD: 1.19 K/UL (ref 0.1–0.95)
MONOCYTES NFR BLD: 10 % (ref 2–12)
NEUTROPHILS NFR BLD: 82 % (ref 43–80)
NEUTS SEG NFR BLD: 10.14 K/UL (ref 1.8–7.3)
P E INTERPRETATION, U: NORMAL
PATHOLOGIST: NORMAL
PHOSPHATE SERPL-MCNC: 4.6 MG/DL (ref 2.5–4.5)
PHOSPHATE SERPL-MCNC: 5 MG/DL (ref 2.5–4.5)
PHOSPHATE SERPL-MCNC: 5.4 MG/DL (ref 2.5–4.5)
PHOSPHATE SERPL-MCNC: 5.7 MG/DL (ref 2.5–4.5)
PHOSPHATE SERPL-MCNC: 5.7 MG/DL (ref 2.5–4.5)
PLATELET # BLD AUTO: 234 K/UL (ref 130–450)
PMV BLD AUTO: 9.8 FL (ref 7–12)
POTASSIUM SERPL-SCNC: 4.4 MMOL/L (ref 3.5–5.1)
POTASSIUM SERPL-SCNC: 4.8 MMOL/L (ref 3.5–5.1)
POTASSIUM SERPL-SCNC: 4.9 MMOL/L (ref 3.5–5.1)
POTASSIUM SERPL-SCNC: 4.9 MMOL/L (ref 3.5–5.1)
POTASSIUM SERPL-SCNC: 5.2 MMOL/L (ref 3.5–5.1)
PROT FLD-MCNC: 3.1 G/DL
PROT SERPL-MCNC: 6.3 G/DL (ref 6.4–8.3)
PROTHROMBIN TIME: 17.8 SEC (ref 9.3–12.4)
RBC # BLD AUTO: 2.7 M/UL (ref 3.8–5.8)
RBC # BLD: ABNORMAL 10*6/UL
SODIUM SERPL-SCNC: 135 MMOL/L (ref 136–145)
SODIUM SERPL-SCNC: 136 MMOL/L (ref 136–145)
SODIUM SERPL-SCNC: 136 MMOL/L (ref 136–145)
SPECIMEN DESCRIPTION: NORMAL
SPECIMEN TYPE: NORMAL
TME LAST DOSE: NORMAL H
VANCOMYCIN DOSE: NORMAL MG
VANCOMYCIN SERPL-MCNC: 18.2 UG/ML (ref 5–40)
WBC OTHER # BLD: 12.4 K/UL (ref 4.5–11.5)

## 2025-05-19 PROCEDURE — 2580000003 HC RX 258

## 2025-05-19 PROCEDURE — 71045 X-RAY EXAM CHEST 1 VIEW: CPT

## 2025-05-19 PROCEDURE — 2500000003 HC RX 250 WO HCPCS: Performed by: INTERNAL MEDICINE

## 2025-05-19 PROCEDURE — 36415 COLL VENOUS BLD VENIPUNCTURE: CPT

## 2025-05-19 PROCEDURE — 6360000002 HC RX W HCPCS

## 2025-05-19 PROCEDURE — 80048 BASIC METABOLIC PNL TOTAL CA: CPT

## 2025-05-19 PROCEDURE — 6370000000 HC RX 637 (ALT 250 FOR IP)

## 2025-05-19 PROCEDURE — 94660 CPAP INITIATION&MGMT: CPT

## 2025-05-19 PROCEDURE — 85025 COMPLETE CBC W/AUTO DIFF WBC: CPT

## 2025-05-19 PROCEDURE — 87086 URINE CULTURE/COLONY COUNT: CPT

## 2025-05-19 PROCEDURE — 2580000003 HC RX 258: Performed by: INTERNAL MEDICINE

## 2025-05-19 PROCEDURE — 2000000000 HC ICU R&B

## 2025-05-19 PROCEDURE — 82962 GLUCOSE BLOOD TEST: CPT

## 2025-05-19 PROCEDURE — 83735 ASSAY OF MAGNESIUM: CPT

## 2025-05-19 PROCEDURE — 80053 COMPREHEN METABOLIC PANEL: CPT

## 2025-05-19 PROCEDURE — 2700000000 HC OXYGEN THERAPY PER DAY

## 2025-05-19 PROCEDURE — 87077 CULTURE AEROBIC IDENTIFY: CPT

## 2025-05-19 PROCEDURE — 84100 ASSAY OF PHOSPHORUS: CPT

## 2025-05-19 PROCEDURE — 85610 PROTHROMBIN TIME: CPT

## 2025-05-19 PROCEDURE — 94640 AIRWAY INHALATION TREATMENT: CPT

## 2025-05-19 PROCEDURE — 80202 ASSAY OF VANCOMYCIN: CPT

## 2025-05-19 PROCEDURE — 99291 CRITICAL CARE FIRST HOUR: CPT | Performed by: INTERNAL MEDICINE

## 2025-05-19 PROCEDURE — 82010 KETONE BODYS QUAN: CPT

## 2025-05-19 PROCEDURE — 99231 SBSQ HOSP IP/OBS SF/LOW 25: CPT | Performed by: INTERNAL MEDICINE

## 2025-05-19 PROCEDURE — 2500000003 HC RX 250 WO HCPCS

## 2025-05-19 RX ADMIN — MIDODRINE HYDROCHLORIDE 20 MG: 10 TABLET ORAL at 12:00

## 2025-05-19 RX ADMIN — HYDROCORTISONE SODIUM SUCCINATE 100 MG: 100 INJECTION INTRAMUSCULAR; INTRAVENOUS at 05:24

## 2025-05-19 RX ADMIN — HYDROMORPHONE HYDROCHLORIDE 0.25 MG: 1 INJECTION, SOLUTION INTRAMUSCULAR; INTRAVENOUS; SUBCUTANEOUS at 06:25

## 2025-05-19 RX ADMIN — MIDODRINE HYDROCHLORIDE 20 MG: 10 TABLET ORAL at 16:33

## 2025-05-19 RX ADMIN — POTASSIUM CHLORIDE 10 MEQ: 7.46 INJECTION, SOLUTION INTRAVENOUS at 01:45

## 2025-05-19 RX ADMIN — POTASSIUM CHLORIDE 10 MEQ: 7.46 INJECTION, SOLUTION INTRAVENOUS at 06:01

## 2025-05-19 RX ADMIN — SALINE NASAL SPRAY 1 SPRAY: 1.5 SOLUTION NASAL at 09:38

## 2025-05-19 RX ADMIN — POTASSIUM CHLORIDE 10 MEQ: 7.46 INJECTION, SOLUTION INTRAVENOUS at 15:19

## 2025-05-19 RX ADMIN — Medication 2000 UNITS: at 09:38

## 2025-05-19 RX ADMIN — MIDODRINE HYDROCHLORIDE 20 MG: 10 TABLET ORAL at 09:37

## 2025-05-19 RX ADMIN — POTASSIUM CHLORIDE 10 MEQ: 7.46 INJECTION, SOLUTION INTRAVENOUS at 00:26

## 2025-05-19 RX ADMIN — POTASSIUM CHLORIDE 10 MEQ: 7.46 INJECTION, SOLUTION INTRAVENOUS at 16:26

## 2025-05-19 RX ADMIN — ATORVASTATIN CALCIUM 40 MG: 40 TABLET, FILM COATED ORAL at 09:38

## 2025-05-19 RX ADMIN — LEVOTHYROXINE SODIUM 50 MCG: 0.05 TABLET ORAL at 06:44

## 2025-05-19 RX ADMIN — DEXTROSE AND SODIUM CHLORIDE: 5; 450 INJECTION, SOLUTION INTRAVENOUS at 21:02

## 2025-05-19 RX ADMIN — SALINE NASAL SPRAY 1 SPRAY: 1.5 SOLUTION NASAL at 13:32

## 2025-05-19 RX ADMIN — HYDROCORTISONE SODIUM SUCCINATE 100 MG: 100 INJECTION INTRAMUSCULAR; INTRAVENOUS at 20:00

## 2025-05-19 RX ADMIN — IPRATROPIUM BROMIDE AND ALBUTEROL SULFATE 1 DOSE: 2.5; .5 SOLUTION RESPIRATORY (INHALATION) at 19:54

## 2025-05-19 RX ADMIN — POTASSIUM CHLORIDE 10 MEQ: 7.46 INJECTION, SOLUTION INTRAVENOUS at 08:17

## 2025-05-19 RX ADMIN — DEXTROSE AND SODIUM CHLORIDE: 5; 450 INJECTION, SOLUTION INTRAVENOUS at 00:19

## 2025-05-19 RX ADMIN — SERTRALINE 50 MG: 50 TABLET, FILM COATED ORAL at 09:38

## 2025-05-19 RX ADMIN — HYDROMORPHONE HYDROCHLORIDE 0.25 MG: 1 INJECTION, SOLUTION INTRAMUSCULAR; INTRAVENOUS; SUBCUTANEOUS at 20:00

## 2025-05-19 RX ADMIN — SODIUM CHLORIDE, PRESERVATIVE FREE 10 ML: 5 INJECTION INTRAVENOUS at 20:00

## 2025-05-19 RX ADMIN — PIPERACILLIN AND TAZOBACTAM 4500 MG: 4; .5 INJECTION, POWDER, LYOPHILIZED, FOR SOLUTION INTRAVENOUS at 14:32

## 2025-05-19 RX ADMIN — Medication 5 MCG/MIN: at 10:25

## 2025-05-19 RX ADMIN — IPRATROPIUM BROMIDE AND ALBUTEROL SULFATE 1 DOSE: 2.5; .5 SOLUTION RESPIRATORY (INHALATION) at 11:05

## 2025-05-19 RX ADMIN — IPRATROPIUM BROMIDE AND ALBUTEROL SULFATE 1 DOSE: 2.5; .5 SOLUTION RESPIRATORY (INHALATION) at 07:58

## 2025-05-19 RX ADMIN — SALINE NASAL SPRAY 1 SPRAY: 1.5 SOLUTION NASAL at 20:00

## 2025-05-19 RX ADMIN — DEXTROSE AND SODIUM CHLORIDE: 5; 450 INJECTION, SOLUTION INTRAVENOUS at 10:21

## 2025-05-19 RX ADMIN — IPRATROPIUM BROMIDE AND ALBUTEROL SULFATE 1 DOSE: 2.5; .5 SOLUTION RESPIRATORY (INHALATION) at 16:37

## 2025-05-19 RX ADMIN — HYDROCORTISONE SODIUM SUCCINATE 100 MG: 100 INJECTION INTRAMUSCULAR; INTRAVENOUS at 13:31

## 2025-05-19 RX ADMIN — SODIUM CHLORIDE, PRESERVATIVE FREE 10 ML: 5 INJECTION INTRAVENOUS at 09:38

## 2025-05-19 RX ADMIN — POTASSIUM CHLORIDE 10 MEQ: 7.46 INJECTION, SOLUTION INTRAVENOUS at 11:08

## 2025-05-19 RX ADMIN — POTASSIUM CHLORIDE 10 MEQ: 7.46 INJECTION, SOLUTION INTRAVENOUS at 12:24

## 2025-05-19 RX ADMIN — ACETAMINOPHEN 650 MG: 325 TABLET ORAL at 16:33

## 2025-05-19 RX ADMIN — VANCOMYCIN HYDROCHLORIDE 1000 MG: 1 INJECTION, POWDER, LYOPHILIZED, FOR SOLUTION INTRAVENOUS at 11:22

## 2025-05-19 RX ADMIN — SODIUM CHLORIDE 2.2 UNITS/HR: 9 INJECTION, SOLUTION INTRAVENOUS at 15:21

## 2025-05-19 RX ADMIN — LEVOTHYROXINE SODIUM 100 MCG: 0.1 TABLET ORAL at 06:44

## 2025-05-19 RX ADMIN — PIPERACILLIN AND TAZOBACTAM 4500 MG: 4; .5 INJECTION, POWDER, LYOPHILIZED, FOR SOLUTION INTRAVENOUS at 02:20

## 2025-05-19 ASSESSMENT — PAIN - FUNCTIONAL ASSESSMENT
PAIN_FUNCTIONAL_ASSESSMENT: PREVENTS OR INTERFERES SOME ACTIVE ACTIVITIES AND ADLS
PAIN_FUNCTIONAL_ASSESSMENT: PREVENTS OR INTERFERES SOME ACTIVE ACTIVITIES AND ADLS

## 2025-05-19 ASSESSMENT — PAIN DESCRIPTION - LOCATION
LOCATION: BACK;RIB CAGE
LOCATION: BACK
LOCATION: BACK;RIB CAGE

## 2025-05-19 ASSESSMENT — PAIN SCALES - GENERAL
PAINLEVEL_OUTOF10: 3
PAINLEVEL_OUTOF10: 5
PAINLEVEL_OUTOF10: 10
PAINLEVEL_OUTOF10: 4
PAINLEVEL_OUTOF10: 10

## 2025-05-19 ASSESSMENT — PAIN DESCRIPTION - ORIENTATION
ORIENTATION: RIGHT;MID;LOWER
ORIENTATION: LOWER
ORIENTATION: RIGHT;LOWER

## 2025-05-19 ASSESSMENT — PAIN DESCRIPTION - DESCRIPTORS
DESCRIPTORS: ACHING;DISCOMFORT;THROBBING
DESCRIPTORS: ACHING;DISCOMFORT;SORE;THROBBING

## 2025-05-19 ASSESSMENT — PAIN SCALES - WONG BAKER: WONGBAKER_NUMERICALRESPONSE: NO HURT

## 2025-05-19 ASSESSMENT — PAIN DESCRIPTION - FREQUENCY: FREQUENCY: INTERMITTENT

## 2025-05-19 NOTE — PROGRESS NOTES
Department of Internal Medicine  Nephrology Attending Consult Note      Events reviewed.    SUBJECTIVE: We are following Mr. Wander Rocha for CKD.  Reports no new complaints.    PHYSICAL EXAM:      Vitals:    VITALS:  /67   Pulse 62   Temp 97.2 °F (36.2 °C) (Bladder)   Resp 22   Ht 1.702 m (5' 7\")   Wt 98.5 kg (217 lb 2.5 oz)   SpO2 94%   BMI 34.01 kg/m²   24HR INTAKE/OUTPUT:    Intake/Output Summary (Last 24 hours) at 5/19/2025 1700  Last data filed at 5/19/2025 1600  Gross per 24 hour   Intake 3959.78 ml   Output 2385 ml   Net 1574.78 ml       Constitutional:  Awake, alert, oriented, in NAD  HEENT:  PERRLA, normocephalic, atraumatic  Respiratory:  wheezes  Cardiovascular/Edema:  RRR, normal S1, normal S2, + edema  Gastrointestinal: Distended  Neurologic:  Nonfocal  Skin:  warm, dry, no rashes, no lesions    Scheduled Meds:   midodrine  20 mg Oral TID WC    lidocaine  1 patch TransDERmal Daily    [Held by provider] lactulose  20 g Oral TID    [Held by provider] rifAXIMin  400 mg Oral TID    hydrocortisone sodium succinate PF  100 mg IntraVENous Q8H    piperacillin-tazobactam  4,500 mg IntraVENous Q12H    ipratropium 0.5 mg-albuterol 2.5 mg  1 Dose Inhalation Q4H WA RT    sevelamer  0.8 g Oral TID     sodium chloride  1 spray Each Nostril TID    [Held by provider] metoprolol succinate  12.5 mg Oral Daily    epoetin lay-epbx  3,000 Units SubCUTAneous Once per day on Tuesday Thursday Saturday    sodium chloride flush  5-40 mL IntraVENous 2 times per day    [Held by provider] acetaZOLAMIDE  250 mg Oral Daily    [Held by provider] allopurinol  200 mg Oral Daily    [Held by provider] apixaban  5 mg Oral BID    [Held by provider] aspirin  81 mg Oral Daily    atorvastatin  40 mg Oral Daily    [Held by provider] bumetanide  2 mg Oral BID    vitamin D  2,000 Units Oral Daily    [Held by provider] empagliflozin  10 mg Oral Daily    levothyroxine  100 mcg Oral Daily    levothyroxine  50 mcg Oral Daily    [Held

## 2025-05-19 NOTE — PROGRESS NOTES
Pharmacy Consultation Note  (Antibiotic Dosing and Monitoring)    Initial consult date: 5/16/25  Consulting physician/provider: Chelsey  Drug: Vancomycin  Indication: Pneumonia (CAP/Nosocomial)    Vancomycin has been discontinued. Clinical pharmacy will sign off, please reconsult if further assistance is needed.     Esther De Los Santos, PharmD, BCPS, BCCCP 5/19/2025 9:32 AM   [phone]

## 2025-05-19 NOTE — PLAN OF CARE
Problem: Safety - Adult  Goal: Free from fall injury  Outcome: Progressing  Flowsheets (Taken 5/18/2025 2100)  Free From Fall Injury: Instruct family/caregiver on patient safety     Problem: Chronic Conditions and Co-morbidities  Goal: Patient's chronic conditions and co-morbidity symptoms are monitored and maintained or improved  Outcome: Progressing  Flowsheets (Taken 5/18/2025 2000)  Care Plan - Patient's Chronic Conditions and Co-Morbidity Symptoms are Monitored and Maintained or Improved:   Monitor and assess patient's chronic conditions and comorbid symptoms for stability, deterioration, or improvement   Collaborate with multidisciplinary team to address chronic and comorbid conditions and prevent exacerbation or deterioration   Update acute care plan with appropriate goals if chronic or comorbid symptoms are exacerbated and prevent overall improvement and discharge     Problem: Skin/Tissue Integrity  Goal: Skin integrity remains intact  Description: 1.  Monitor for areas of redness and/or skin breakdown2.  Assess vascular access sites hourly3.  Every 4-6 hours minimum:  Change oxygen saturation probe site4.  Every 4-6 hours:  If on nasal continuous positive airway pressure, respiratory therapy assess nares and determine need for appliance change or resting period  Outcome: Progressing  Flowsheets  Taken 5/18/2025 2100  Skin Integrity Remains Intact:   Monitor for areas of redness and/or skin breakdown   Turn and reposition as indicated   Assess need for specialty bed   Positioning devices   Pressure redistribution bed/mattress (bed type)   Check visual cues for pain  Taken 5/18/2025 2000  Skin Integrity Remains Intact:   Monitor for areas of redness and/or skin breakdown   Turn and reposition as indicated   Assess need for specialty bed   Positioning devices   Pressure redistribution bed/mattress (bed type)   Check visual cues for pain     Problem: ABCDS Injury Assessment  Goal: Absence of physical

## 2025-05-19 NOTE — PROGRESS NOTES
[facility]   Department of Internal Medicine   Internal Medicine Residency  MICU Progress Note    Patient:  Wander Rocha 69 y.o. male   MRN: [id number]      Room: [address] [place], [place] [address]    Admission date: 5/14/2025  2:21 PM ; Hospital day: 5   ICU day: 3d 11h      Allergy: Patient has no known allergies.    Subjective     Patient was seen and examined this morning at bedside. He is on 8 L oxygen via Venturi mask. On 7 mcg/min of norepinephrine. He is alert and oriented x3. He denies chest pain, SOB, N/V. Chest tube with 380 mL drained.     Objective       I & O - 24hr:    Intake/Output Summary (Last 24 hours) at 5/19/2025 0737  Last data filed at 5/19/2025 0600  Gross per 24 hour   Intake 1841.91 ml   Output 2415 ml   Net -573.09 ml     Net IO Since Admission: 499.03 mL [05/19/25 0737]    Physical Exam  BP (!) 103/46   Pulse 64   Temp (!) 96.1 °F (35.6 °C) (Bladder)   Resp 13   Ht 1.702 m (5' 7\")   Wt 98.5 kg (217 lb 2.5 oz)   SpO2 100%   BMI 34.01 kg/m²   Ideal body weight: 66.1 kg (145 lb 11.6 oz)    General Appearance: alert, appears stated age, cooperative, and no distress. Chest tube 380 mL. Prince with clear yellow urine  HEENT:  dissolvable nasal packing in the left nare, dry blood  Lung: clear to auscultation bilaterally  Heart: S1, S2 normal and sinus rhythm , systolic murmur present  Abdomen:  distended, no tenderness, normal BS  Extremities:   no edema  Neurologic: alert and oriented x3      Medications     Continuous Infusions:   dextrose      insulin 3.6 Units/hr (05/19/25 0700)    dextrose 5 % and 0.45 % NaCl 100 mL/hr at 05/19/25 0019    VASOpressin 20 Units in sodium chloride 0.9 % 100 mL IVPB (Awpr7Upj) Stopped (05/18/25 1653)    norepinephrine 9 mcg/min (05/19/25 0308)    sodium chloride      sodium chloride      dextrose      sodium chloride       Scheduled Meds:   midodrine  20 mg Oral TID WC    lidocaine  1 patch TransDERmal Daily    [Held by provider] lactulose  20 g Oral TID

## 2025-05-19 NOTE — PROGRESS NOTES
Speech Language Pathology  NAME:  Wander Rocha  :  1955  DATE: 2025  ROOM:  [facility]    Pt unavailable at 1250 for Clinical Swallow Evaluation Discussed with patient nurse via phone     REASON:  HOLD per RN, Pt currently NPO for possible IR procedure and is on insulin trip. Requested we try tomorrow.     Will re-attempt as appropriate.       Thank You    Lindy Sharma M.S., CCC-SLP  Speech-Language Pathologist  [id number]  2025

## 2025-05-19 NOTE — PROGRESS NOTES
[facility]  Internal Medicine Residency Program  Progress Note - House Team 2    Patient:  Wander Rocha 69 y.o. male MRN: [id number]     Date of Service: 5/20/2025     CC: shortness of breath    Days since admission: 6    Subjective     Overnight events: Bridged with 20 units of lantus    He was seen and examined at bedside this morning.  He was awake, alert, and oriented. He had no acute concerns today. He is on Venturi mask. No recurrence of nose bleeding. He is off levophed. Blood pressure remains stable. He had a bowel movement last night.     Objective     VS: /67   Pulse 60   Temp (!) 96.4 °F (35.8 °C) (Bladder)   Resp 18   Ht 1.702 m (5' 7\")   Wt 98.5 kg (217 lb 2.5 oz)   SpO2 97%   BMI 34.01 kg/m²   ABP (Arterial line BP): (!) 68/57  ABP Mean (Arterial Line Mean): (!) 64 mmHg  I & O - 24hr:   Intake/Output Summary (Last 24 hours) at 5/20/2025 0824  Last data filed at 5/20/2025 0600  Gross per 24 hour   Intake 1752.86 ml   Output 1115 ml   Net 637.86 ml     Net IO Since Admission: 2,538.91 mL [05/20/25 0824]    Physical Exam:  General Appearance: in mild distress, on Venturi mask  HEENT: Normocephalic, atraumatic  Neck: midline trachea  Lung: decreased breath sounds L>R, (+) chest tube on the right lung   Heart: regular rate and rhythm, no murmur  Abdomen: soft, (+) distended abdomen   Extremities: no cyanosis or edema  Musculokeletal: No joint swelling  Neurologic: Mental status: awake, alert    Interventions:  Procedures:   Chest tube insertion, right lung (05/18/2025)    Airway: Venturi mask     Medications     Continuous Infusions:   dextrose      insulin 1 Units/hr (05/20/25 0817)    dextrose 5 % and 0.45 % NaCl 50 mL/hr at 05/20/25 0809    VASOpressin 20 Units in sodium chloride 0.9 % 100 mL IVPB (Ausu0Ery) Stopped (05/18/25 1653)    norepinephrine Stopped (05/19/25 1157)    sodium chloride      sodium chloride      dextrose      sodium chloride       Scheduled Meds:    Specimen Description .PLEURAL FLUID     Special Requests Site: Body Fluid     Direct Exam Gram stain performed on unspun fluid.      RARE Polymorphonuclear leukocytes      NO EPITHELIAL CELLS      NO ORGANISMS SEEN     Culture NO GROWTH 18 HOURS    Culture with Smear, Acid Fast Bacillius [[id number]] Collected: 05/18/25 1401    Order Status: Sent Specimen: Body Fluid Updated: 05/18/25 1413    Culture, Fungus [[id number]] Collected: 05/18/25 1401    Order Status: Sent Specimen: Body Fluid Updated: 05/18/25 1413    Culture, MRSA, Screening [[id number]] Collected: 05/17/25 1305    Order Status: Completed Specimen: Nares Updated: 05/19/25 1027     Specimen Description .NARES     Culture NEGATIVE FOR: METHICILLIN RESISTANT STAPHYLOCOCCUS AUREUS    Culture, Respiratory [[id number]]     Order Status: Sent Specimen: Sputum Expectorated     Culture, Blood 1 [[id number]] Collected: 05/16/25 0812    Order Status: Completed Specimen: Blood Updated: 05/19/25 0847     Specimen Description .BLOOD .ARM     Special Requests          Culture NO GROWTH 3 DAYS    Culture, Blood 2 [[id number]] Collected: 05/16/25 0812    Order Status: Completed Specimen: Blood Updated: 05/19/25 0842     Specimen Description .BLOOD .ARM     Special Requests          Culture NO GROWTH 3 DAYS    Culture, Urine [[id number]] Collected: 05/16/25 0745    Order Status: Canceled Specimen: Urine, clean catch     Culture, MRSA, Screening [[id number]]     Order Status: Canceled Specimen: Nares     STREP PNEUMONIAE ANTIGEN [[id number]] Collected: 05/15/25 1615    Order Status: Completed Specimen: Urine, clean catch Updated: 05/16/25 1144     Source .URINE     Strep pneumo Ag NEGATIVE     Comment:       Presumptive Negative  suggests no current or recent pneumococcal infection. Infection due to Strep pneumoniae   cannot be ruled out since the antigen present in the sample may be below the detection limit   of the test.         LEGIONELLA ANTIGEN, URINE [[id number]]

## 2025-05-19 NOTE — PLAN OF CARE
Problem: Discharge Planning  Goal: Discharge to home or other facility with appropriate resources  5/19/2025 1921 by Khalida Abraham RN  Outcome: Progressing     Problem: Pain  Goal: Verbalizes/displays adequate comfort level or baseline comfort level  5/19/2025 1921 by Khalida Abraham RN  Outcome: Not Progressing    Problem: Safety - Adult  Goal: Free from fall injury  5/19/2025 1921 by Khalida Abraham RN  Outcome: Progressing     Problem: Chronic Conditions and Co-morbidities  Goal: Patient's chronic conditions and co-morbidity symptoms are monitored and maintained or improved  5/19/2025 1921 by Khalida Abraham RN  Outcome: Progressing     Problem: Discharge Planning  Goal: Discharge to home or other facility with appropriate resources  5/19/2025 1921 by Khalida Abraham RN  Outcome: Progressing     Problem: Skin/Tissue Integrity  Goal: Skin integrity remains intact  Description: 1.  Monitor for areas of redness and/or skin breakdown2.  Assess vascular access sites hourly3.  Every 4-6 hours minimum:  Change oxygen saturation probe site4.  Every 4-6 hours:  If on nasal continuous positive airway pressure, respiratory therapy assess nares and determine need for appliance change or resting period  5/19/2025 1921 by Khalida Abraham, RN  Outcome: Progressing     Problem: ABCDS Injury Assessment  Goal: Absence of physical injury  5/19/2025 1921 by Khalida Abraham RN  Outcome: Progressing

## 2025-05-19 NOTE — PROGRESS NOTES
Consult received for diabetes education. Chart reviewed. Pt not appropriate for education at this time. Will follow chart.     Electronically signed by Barbara Rodriguez RN on 5/19/2025 at 3:09 PM

## 2025-05-20 ENCOUNTER — APPOINTMENT (OUTPATIENT)
Dept: GENERAL RADIOLOGY | Age: 70
DRG: 871 | End: 2025-05-20
Payer: MEDICARE

## 2025-05-20 ENCOUNTER — APPOINTMENT (OUTPATIENT)
Dept: INTERVENTIONAL RADIOLOGY/VASCULAR | Age: 70
DRG: 871 | End: 2025-05-20
Payer: MEDICARE

## 2025-05-20 PROBLEM — E11.10 DIABETIC KETOACIDOSIS WITHOUT COMA ASSOCIATED WITH TYPE 2 DIABETES MELLITUS (HCC): Status: ACTIVE | Noted: 2025-05-20

## 2025-05-20 PROBLEM — Z91.119 DIETARY NONCOMPLIANCE: Status: ACTIVE | Noted: 2025-05-20

## 2025-05-20 LAB
ALBUMIN SERPL-MCNC: 4 G/DL (ref 3.5–5.2)
ALP SERPL-CCNC: 60 U/L (ref 40–129)
ALT SERPL-CCNC: 23 U/L (ref 0–50)
ANION GAP SERPL CALCULATED.3IONS-SCNC: 13 MMOL/L (ref 7–16)
ANION GAP SERPL CALCULATED.3IONS-SCNC: 13 MMOL/L (ref 7–16)
ANION GAP SERPL CALCULATED.3IONS-SCNC: 14 MMOL/L (ref 7–16)
ANION GAP SERPL CALCULATED.3IONS-SCNC: 14 MMOL/L (ref 7–16)
AST SERPL-CCNC: 25 U/L (ref 0–50)
B-OH-BUTYR SERPL-MCNC: 0.29 MMOL/L (ref 0.02–0.27)
B.E.: 1.1 MMOL/L (ref -3–3)
BASOPHILS # BLD: 0 K/UL (ref 0–0.2)
BASOPHILS NFR BLD: 0 % (ref 0–2)
BILIRUB SERPL-MCNC: 0.6 MG/DL (ref 0–1.2)
BUN SERPL-MCNC: 70 MG/DL (ref 8–23)
BUN SERPL-MCNC: 72 MG/DL (ref 8–23)
BUN SERPL-MCNC: 72 MG/DL (ref 8–23)
BUN SERPL-MCNC: 74 MG/DL (ref 8–23)
CALCIUM SERPL-MCNC: 8 MG/DL (ref 8.8–10.2)
CALCIUM SERPL-MCNC: 8.1 MG/DL (ref 8.8–10.2)
CHLORIDE SERPL-SCNC: 98 MMOL/L (ref 98–107)
CHLORIDE SERPL-SCNC: 98 MMOL/L (ref 98–107)
CHLORIDE SERPL-SCNC: 99 MMOL/L (ref 98–107)
CHLORIDE SERPL-SCNC: 99 MMOL/L (ref 98–107)
CO2 SERPL-SCNC: 21 MMOL/L (ref 22–29)
CO2 SERPL-SCNC: 22 MMOL/L (ref 22–29)
COHB: 0.7 % (ref 0–1.5)
CREAT SERPL-MCNC: 3 MG/DL (ref 0.7–1.2)
CREAT SERPL-MCNC: 3.1 MG/DL (ref 0.7–1.2)
CREAT SERPL-MCNC: 3.2 MG/DL (ref 0.7–1.2)
CREAT SERPL-MCNC: 3.3 MG/DL (ref 0.7–1.2)
CRITICAL: ABNORMAL
DATE ANALYZED: ABNORMAL
DATE OF COLLECTION: ABNORMAL
EOSINOPHIL # BLD: 0 K/UL (ref 0.05–0.5)
EOSINOPHILS RELATIVE PERCENT: 0 % (ref 0–6)
ERYTHROCYTE [DISTWIDTH] IN BLOOD BY AUTOMATED COUNT: 17.4 % (ref 11.5–15)
GFR, ESTIMATED: 19 ML/MIN/1.73M2
GFR, ESTIMATED: 20 ML/MIN/1.73M2
GFR, ESTIMATED: 20 ML/MIN/1.73M2
GFR, ESTIMATED: 21 ML/MIN/1.73M2
GLUCOSE BLD-MCNC: 106 MG/DL (ref 74–99)
GLUCOSE BLD-MCNC: 117 MG/DL (ref 74–99)
GLUCOSE BLD-MCNC: 122 MG/DL (ref 74–99)
GLUCOSE BLD-MCNC: 169 MG/DL (ref 74–99)
GLUCOSE BLD-MCNC: 171 MG/DL (ref 74–99)
GLUCOSE BLD-MCNC: 171 MG/DL (ref 74–99)
GLUCOSE BLD-MCNC: 173 MG/DL (ref 74–99)
GLUCOSE BLD-MCNC: 174 MG/DL (ref 74–99)
GLUCOSE BLD-MCNC: 175 MG/DL (ref 74–99)
GLUCOSE BLD-MCNC: 184 MG/DL (ref 74–99)
GLUCOSE BLD-MCNC: 185 MG/DL (ref 74–99)
GLUCOSE BLD-MCNC: 188 MG/DL (ref 74–99)
GLUCOSE SERPL-MCNC: 164 MG/DL (ref 74–99)
GLUCOSE SERPL-MCNC: 179 MG/DL (ref 74–99)
GLUCOSE SERPL-MCNC: 180 MG/DL (ref 74–99)
GLUCOSE SERPL-MCNC: 188 MG/DL (ref 74–99)
HCO3: 26.4 MMOL/L (ref 22–26)
HCT VFR BLD AUTO: 26.1 % (ref 37–54)
HGB BLD-MCNC: 8 G/DL (ref 12.5–16.5)
HHB: 2.7 % (ref 0–5)
LAB: ABNORMAL
LYMPHOCYTES NFR BLD: 0.77 K/UL (ref 1.5–4)
LYMPHOCYTES RELATIVE PERCENT: 6 % (ref 20–42)
Lab: 2335
MAGNESIUM SERPL-MCNC: 2.2 MG/DL (ref 1.6–2.4)
MAGNESIUM SERPL-MCNC: 2.3 MG/DL (ref 1.6–2.4)
MCH RBC QN AUTO: 29.6 PG (ref 26–35)
MCHC RBC AUTO-ENTMCNC: 30.7 G/DL (ref 32–34.5)
MCV RBC AUTO: 96.7 FL (ref 80–99.9)
METHB: 0.5 % (ref 0–1.5)
MICROORGANISM SPEC CULT: NO GROWTH
MICROORGANISM/AGENT SPEC: NORMAL
MODE: ABNORMAL
MONOCYTES NFR BLD: 0.77 K/UL (ref 0.1–0.95)
MONOCYTES NFR BLD: 6 % (ref 2–12)
MYELOCYTES ABSOLUTE COUNT: 0.33 K/UL
MYELOCYTES: 3 %
NEUTROPHILS NFR BLD: 85 % (ref 43–80)
NEUTS SEG NFR BLD: 10.74 K/UL (ref 1.8–7.3)
NON-GYN CYTOLOGY REPORT: NORMAL
O2 SATURATION: 97.3 % (ref 92–98.5)
O2HB: 96.1 % (ref 94–97)
OPERATOR ID: ABNORMAL
PATIENT TEMP: 37 C
PCO2: 45.1 MMHG (ref 35–45)
PH BLOOD GAS: 7.38 (ref 7.35–7.45)
PHOSPHATE SERPL-MCNC: 4.6 MG/DL (ref 2.5–4.5)
PHOSPHATE SERPL-MCNC: 5 MG/DL (ref 2.5–4.5)
PHOSPHATE SERPL-MCNC: 5 MG/DL (ref 2.5–4.5)
PHOSPHATE SERPL-MCNC: 5.2 MG/DL (ref 2.5–4.5)
PLATELET # BLD AUTO: 206 K/UL (ref 130–450)
PMV BLD AUTO: 9.6 FL (ref 7–12)
PO2: 92.6 MMHG (ref 75–100)
POTASSIUM SERPL-SCNC: 4.9 MMOL/L (ref 3.5–5.1)
POTASSIUM SERPL-SCNC: 5 MMOL/L (ref 3.5–5.1)
POTASSIUM SERPL-SCNC: 5.1 MMOL/L (ref 3.5–5.1)
POTASSIUM SERPL-SCNC: 5.1 MMOL/L (ref 3.5–5.1)
PROT SERPL-MCNC: 6.8 G/DL (ref 6.4–8.3)
RBC # BLD AUTO: 2.7 M/UL (ref 3.8–5.8)
RBC # BLD: ABNORMAL 10*6/UL
SERVICE CMNT-IMP: NORMAL
SODIUM SERPL-SCNC: 133 MMOL/L (ref 136–145)
SODIUM SERPL-SCNC: 134 MMOL/L (ref 136–145)
SODIUM SERPL-SCNC: 135 MMOL/L (ref 136–145)
SODIUM SERPL-SCNC: 135 MMOL/L (ref 136–145)
SOURCE, BLOOD GAS: ABNORMAL
SPECIMEN DESCRIPTION: NORMAL
T4 FREE SERPL-MCNC: 0.8 NG/DL (ref 0.9–1.7)
THB: 8.8 G/DL (ref 11.5–16.5)
TIME ANALYZED: 2342
TSH SERPL DL<=0.05 MIU/L-ACNC: 5.8 UIU/ML (ref 0.27–4.2)
WBC OTHER # BLD: 12.6 K/UL (ref 4.5–11.5)

## 2025-05-20 PROCEDURE — 90935 HEMODIALYSIS ONE EVALUATION: CPT

## 2025-05-20 PROCEDURE — 80048 BASIC METABOLIC PNL TOTAL CA: CPT

## 2025-05-20 PROCEDURE — 84100 ASSAY OF PHOSPHORUS: CPT

## 2025-05-20 PROCEDURE — 99232 SBSQ HOSP IP/OBS MODERATE 35: CPT | Performed by: INTERNAL MEDICINE

## 2025-05-20 PROCEDURE — 6360000002 HC RX W HCPCS

## 2025-05-20 PROCEDURE — 94640 AIRWAY INHALATION TREATMENT: CPT

## 2025-05-20 PROCEDURE — 85025 COMPLETE CBC W/AUTO DIFF WBC: CPT

## 2025-05-20 PROCEDURE — 82962 GLUCOSE BLOOD TEST: CPT

## 2025-05-20 PROCEDURE — 2000000000 HC ICU R&B

## 2025-05-20 PROCEDURE — 82805 BLOOD GASES W/O2 SATURATION: CPT

## 2025-05-20 PROCEDURE — 84439 ASSAY OF FREE THYROXINE: CPT

## 2025-05-20 PROCEDURE — 02H633Z INSERTION OF INFUSION DEVICE INTO RIGHT ATRIUM, PERCUTANEOUS APPROACH: ICD-10-PCS | Performed by: RADIOLOGY

## 2025-05-20 PROCEDURE — 76937 US GUIDE VASCULAR ACCESS: CPT

## 2025-05-20 PROCEDURE — 0JH63XZ INSERTION OF TUNNELED VASCULAR ACCESS DEVICE INTO CHEST SUBCUTANEOUS TISSUE AND FASCIA, PERCUTANEOUS APPROACH: ICD-10-PCS | Performed by: RADIOLOGY

## 2025-05-20 PROCEDURE — 6360000002 HC RX W HCPCS: Performed by: RADIOLOGY

## 2025-05-20 PROCEDURE — 84443 ASSAY THYROID STIM HORMONE: CPT

## 2025-05-20 PROCEDURE — 6360000002 HC RX W HCPCS: Performed by: INTERNAL MEDICINE

## 2025-05-20 PROCEDURE — 2500000003 HC RX 250 WO HCPCS

## 2025-05-20 PROCEDURE — 92610 EVALUATE SWALLOWING FUNCTION: CPT

## 2025-05-20 PROCEDURE — C1894 INTRO/SHEATH, NON-LASER: HCPCS

## 2025-05-20 PROCEDURE — 80053 COMPREHEN METABOLIC PANEL: CPT

## 2025-05-20 PROCEDURE — 6370000000 HC RX 637 (ALT 250 FOR IP)

## 2025-05-20 PROCEDURE — 92526 ORAL FUNCTION THERAPY: CPT

## 2025-05-20 PROCEDURE — 99291 CRITICAL CARE FIRST HOUR: CPT | Performed by: INTERNAL MEDICINE

## 2025-05-20 PROCEDURE — 77001 FLUOROGUIDE FOR VEIN DEVICE: CPT

## 2025-05-20 PROCEDURE — 83735 ASSAY OF MAGNESIUM: CPT

## 2025-05-20 PROCEDURE — 2580000003 HC RX 258

## 2025-05-20 PROCEDURE — 94660 CPAP INITIATION&MGMT: CPT

## 2025-05-20 PROCEDURE — C1750 CATH, HEMODIALYSIS,LONG-TERM: HCPCS

## 2025-05-20 PROCEDURE — 36558 INSERT TUNNELED CV CATH: CPT

## 2025-05-20 PROCEDURE — 2700000000 HC OXYGEN THERAPY PER DAY

## 2025-05-20 PROCEDURE — 71045 X-RAY EXAM CHEST 1 VIEW: CPT

## 2025-05-20 RX ORDER — LIDOCAINE HYDROCHLORIDE 20 MG/ML
INJECTION, SOLUTION INFILTRATION; PERINEURAL PRN
Status: COMPLETED | OUTPATIENT
Start: 2025-05-20 | End: 2025-05-20

## 2025-05-20 RX ORDER — ACETAMINOPHEN 325 MG/1
650 TABLET ORAL EVERY 4 HOURS PRN
Status: DISCONTINUED | OUTPATIENT
Start: 2025-05-20 | End: 2025-05-23

## 2025-05-20 RX ORDER — HEPARIN SODIUM 10000 [USP'U]/ML
INJECTION, SOLUTION INTRAVENOUS; SUBCUTANEOUS PRN
Status: COMPLETED | OUTPATIENT
Start: 2025-05-20 | End: 2025-05-20

## 2025-05-20 RX ORDER — HEPARIN SODIUM 1000 [USP'U]/ML
3200 INJECTION, SOLUTION INTRAVENOUS; SUBCUTANEOUS PRN
Status: DISCONTINUED | OUTPATIENT
Start: 2025-05-20 | End: 2025-05-29 | Stop reason: HOSPADM

## 2025-05-20 RX ORDER — DIPHENHYDRAMINE HYDROCHLORIDE 50 MG/ML
INJECTION, SOLUTION INTRAMUSCULAR; INTRAVENOUS PRN
Status: COMPLETED | OUTPATIENT
Start: 2025-05-20 | End: 2025-05-20

## 2025-05-20 RX ORDER — INSULIN LISPRO 100 [IU]/ML
6 INJECTION, SOLUTION INTRAVENOUS; SUBCUTANEOUS
Status: DISCONTINUED | OUTPATIENT
Start: 2025-05-20 | End: 2025-05-20

## 2025-05-20 RX ORDER — LIDOCAINE HYDROCHLORIDE AND EPINEPHRINE BITARTRATE 20; .01 MG/ML; MG/ML
INJECTION, SOLUTION SUBCUTANEOUS PRN
Status: COMPLETED | OUTPATIENT
Start: 2025-05-20 | End: 2025-05-20

## 2025-05-20 RX ORDER — HYDROCODONE BITARTRATE AND ACETAMINOPHEN 5; 325 MG/1; MG/1
1 TABLET ORAL EVERY 4 HOURS PRN
Status: DISCONTINUED | OUTPATIENT
Start: 2025-05-20 | End: 2025-05-23

## 2025-05-20 RX ORDER — FENTANYL CITRATE 50 UG/ML
INJECTION, SOLUTION INTRAMUSCULAR; INTRAVENOUS PRN
Status: COMPLETED | OUTPATIENT
Start: 2025-05-20 | End: 2025-05-20

## 2025-05-20 RX ORDER — INSULIN GLARGINE 100 [IU]/ML
20 INJECTION, SOLUTION SUBCUTANEOUS NIGHTLY
Status: DISCONTINUED | OUTPATIENT
Start: 2025-05-20 | End: 2025-05-21

## 2025-05-20 RX ORDER — INSULIN LISPRO 100 [IU]/ML
0-4 INJECTION, SOLUTION INTRAVENOUS; SUBCUTANEOUS
Status: DISCONTINUED | OUTPATIENT
Start: 2025-05-20 | End: 2025-05-26

## 2025-05-20 RX ORDER — MIDAZOLAM HYDROCHLORIDE 5 MG/ML
INJECTION, SOLUTION INTRAMUSCULAR; INTRAVENOUS PRN
Status: COMPLETED | OUTPATIENT
Start: 2025-05-20 | End: 2025-05-20

## 2025-05-20 RX ADMIN — HEPARIN SODIUM 3200 UNITS: 1000 INJECTION INTRAVENOUS; SUBCUTANEOUS at 11:42

## 2025-05-20 RX ADMIN — SALINE NASAL SPRAY 1 SPRAY: 1.5 SOLUTION NASAL at 21:41

## 2025-05-20 RX ADMIN — LEVOTHYROXINE SODIUM 100 MCG: 0.1 TABLET ORAL at 07:05

## 2025-05-20 RX ADMIN — Medication 2000 UNITS: at 08:10

## 2025-05-20 RX ADMIN — IPRATROPIUM BROMIDE AND ALBUTEROL SULFATE 1 DOSE: 2.5; .5 SOLUTION RESPIRATORY (INHALATION) at 12:11

## 2025-05-20 RX ADMIN — SALINE NASAL SPRAY 1 SPRAY: 1.5 SOLUTION NASAL at 08:10

## 2025-05-20 RX ADMIN — LIDOCAINE HYDROCHLORIDE,EPINEPHRINE BITARTRATE 5 ML: 20; .01 INJECTION, SOLUTION INFILTRATION; PERINEURAL at 16:34

## 2025-05-20 RX ADMIN — IPRATROPIUM BROMIDE AND ALBUTEROL SULFATE 1 DOSE: 2.5; .5 SOLUTION RESPIRATORY (INHALATION) at 08:14

## 2025-05-20 RX ADMIN — ATORVASTATIN CALCIUM 40 MG: 40 TABLET, FILM COATED ORAL at 08:10

## 2025-05-20 RX ADMIN — FENTANYL CITRATE 50 MCG: 50 INJECTION, SOLUTION INTRAMUSCULAR; INTRAVENOUS at 16:30

## 2025-05-20 RX ADMIN — POTASSIUM CHLORIDE 10 MEQ: 7.46 INJECTION, SOLUTION INTRAVENOUS at 05:29

## 2025-05-20 RX ADMIN — DIPHENHYDRAMINE HYDROCHLORIDE 25 MG: 50 INJECTION, SOLUTION INTRAMUSCULAR; INTRAVENOUS at 16:30

## 2025-05-20 RX ADMIN — MIDODRINE HYDROCHLORIDE 20 MG: 10 TABLET ORAL at 13:06

## 2025-05-20 RX ADMIN — Medication 5000 UNITS: at 16:48

## 2025-05-20 RX ADMIN — MIDAZOLAM HYDROCHLORIDE 1 MG: 5 INJECTION, SOLUTION INTRAMUSCULAR; INTRAVENOUS at 16:30

## 2025-05-20 RX ADMIN — EPOETIN ALFA-EPBX 3000 UNITS: 3000 INJECTION, SOLUTION INTRAVENOUS; SUBCUTANEOUS at 17:21

## 2025-05-20 RX ADMIN — LIDOCAINE HYDROCHLORIDE 8 ML: 20 INJECTION, SOLUTION INFILTRATION; PERINEURAL at 16:33

## 2025-05-20 RX ADMIN — LEVOTHYROXINE SODIUM 50 MCG: 0.05 TABLET ORAL at 07:04

## 2025-05-20 RX ADMIN — PIPERACILLIN AND TAZOBACTAM 4500 MG: 4; .5 INJECTION, POWDER, LYOPHILIZED, FOR SOLUTION INTRAVENOUS at 02:14

## 2025-05-20 RX ADMIN — LIDOCAINE HYDROCHLORIDE 9 ML: 20 INJECTION, SOLUTION INFILTRATION; PERINEURAL at 16:40

## 2025-05-20 RX ADMIN — MIDODRINE HYDROCHLORIDE 20 MG: 10 TABLET ORAL at 08:15

## 2025-05-20 RX ADMIN — APIXABAN 5 MG: 5 TABLET, FILM COATED ORAL at 21:15

## 2025-05-20 RX ADMIN — SERTRALINE 50 MG: 50 TABLET, FILM COATED ORAL at 08:10

## 2025-05-20 RX ADMIN — SODIUM CHLORIDE, PRESERVATIVE FREE 10 ML: 5 INJECTION INTRAVENOUS at 21:41

## 2025-05-20 RX ADMIN — INSULIN GLARGINE 20 UNITS: 100 INJECTION, SOLUTION SUBCUTANEOUS at 08:10

## 2025-05-20 RX ADMIN — PIPERACILLIN AND TAZOBACTAM 4500 MG: 4; .5 INJECTION, POWDER, LYOPHILIZED, FOR SOLUTION INTRAVENOUS at 13:28

## 2025-05-20 RX ADMIN — SALINE NASAL SPRAY 1 SPRAY: 1.5 SOLUTION NASAL at 13:28

## 2025-05-20 RX ADMIN — HYDROCORTISONE SODIUM SUCCINATE 100 MG: 100 INJECTION INTRAMUSCULAR; INTRAVENOUS at 04:15

## 2025-05-20 RX ADMIN — HYDROMORPHONE HYDROCHLORIDE 0.25 MG: 1 INJECTION, SOLUTION INTRAMUSCULAR; INTRAVENOUS; SUBCUTANEOUS at 13:10

## 2025-05-20 RX ADMIN — IPRATROPIUM BROMIDE AND ALBUTEROL SULFATE 1 DOSE: 2.5; .5 SOLUTION RESPIRATORY (INHALATION) at 15:33

## 2025-05-20 RX ADMIN — IPRATROPIUM BROMIDE AND ALBUTEROL SULFATE 1 DOSE: 2.5; .5 SOLUTION RESPIRATORY (INHALATION) at 20:27

## 2025-05-20 RX ADMIN — HYDROMORPHONE HYDROCHLORIDE 0.25 MG: 1 INJECTION, SOLUTION INTRAMUSCULAR; INTRAVENOUS; SUBCUTANEOUS at 04:15

## 2025-05-20 ASSESSMENT — PAIN SCALES - GENERAL
PAINLEVEL_OUTOF10: 0
PAINLEVEL_OUTOF10: 8
PAINLEVEL_OUTOF10: 10
PAINLEVEL_OUTOF10: 5
PAINLEVEL_OUTOF10: 0

## 2025-05-20 ASSESSMENT — PAIN DESCRIPTION - LOCATION
LOCATION: SHOULDER
LOCATION: BACK;RIB CAGE;SHOULDER
LOCATION: BACK;RIB CAGE

## 2025-05-20 ASSESSMENT — PAIN - FUNCTIONAL ASSESSMENT
PAIN_FUNCTIONAL_ASSESSMENT: PREVENTS OR INTERFERES SOME ACTIVE ACTIVITIES AND ADLS
PAIN_FUNCTIONAL_ASSESSMENT: ACTIVITIES ARE NOT PREVENTED

## 2025-05-20 ASSESSMENT — PAIN DESCRIPTION - ORIENTATION
ORIENTATION: RIGHT
ORIENTATION: RIGHT;LOWER

## 2025-05-20 ASSESSMENT — PAIN DESCRIPTION - DESCRIPTORS
DESCRIPTORS: ACHING;DISCOMFORT;SORE;THROBBING
DESCRIPTORS: ACHING;DISCOMFORT;DULL

## 2025-05-20 ASSESSMENT — PAIN DESCRIPTION - PAIN TYPE: TYPE: ACUTE PAIN

## 2025-05-20 NOTE — PLAN OF CARE
Problem: Safety - Adult  Goal: Free from fall injury  Outcome: Progressing     Problem: Chronic Conditions and Co-morbidities  Goal: Patient's chronic conditions and co-morbidity symptoms are monitored and maintained or improved  Outcome: Progressing     Problem: Discharge Planning  Goal: Discharge to home or other facility with appropriate resources  Outcome: Progressing     Problem: Skin/Tissue Integrity  Goal: Skin integrity remains intact  Description: 1.  Monitor for areas of redness and/or skin breakdown2.  Assess vascular access sites hourly3.  Every 4-6 hours minimum:  Change oxygen saturation probe site4.  Every 4-6 hours:  If on nasal continuous positive airway pressure, respiratory therapy assess nares and determine need for appliance change or resting period  Outcome: Progressing     Problem: ABCDS Injury Assessment  Goal: Absence of physical injury  Outcome: Progressing     Problem: Pain  Goal: Verbalizes/displays adequate comfort level or baseline comfort level  Outcome: Progressing     Problem: Neurosensory - Adult  Goal: Achieves stable or improved neurological status  Outcome: Progressing     Problem: Respiratory - Adult  Goal: Achieves optimal ventilation and oxygenation  Outcome: Progressing     Problem: Cardiovascular - Adult  Goal: Maintains optimal cardiac output and hemodynamic stability  Outcome: Progressing

## 2025-05-20 NOTE — PROGRESS NOTES
Physical Therapy  PT consult to evaluate/treat received and appreciated.  Pt chart reviewed and dicussed at quality flow rounds.  Pt is currently on dialysis in AM and IR in PM.  Will check back as able.  Thank you.        Migue Roberts, PT, DPT   [id number]

## 2025-05-20 NOTE — PROGRESS NOTES
SPEECH/LANGUAGE PATHOLOGY  CLINICAL ASSESSMENT OF SWALLOWING FUNCTION   and PLAN OF CARE      PATIENT NAME:  Wander Rocha  (male)     MRN:  [id number]    :  1955  (69 y.o.)  STATUS:  Inpatient: Room 4431/4431-A    TODAY'S DATE:  2025  ORDER DATE, DESCRIPTION AND REFERRING PROVIDER:    25 [email or web address]  SLP swallowing-dysphagia evaluation and treatment  Start:  25 1015,   End:  25 1015,   ONE TIME,   Standing Count:  1 Occurrences,   R         Benjie Melvin MD     REASON FOR REFERRAL: dysphagia   EVALUATING THERAPIST: KATIA De Luna                 ASSESSMENT:    DYSPHAGIA DIAGNOSIS:   functional oropharyngeal swallow for age/premorbid functioning      DIET RECOMMENDATIONS:  Regular consistency solids (IDDSI level 7) with  thin liquids (IDDSI level 0)     FEEDING RECOMMENDATIONS:     Assistance level:  No assistance needed      Compensatory strategies recommended: No strategies are recommended at this time      Discussed recommendations with:  Patient     SPEECH THERAPY  PLAN OF CARE   The dysphagia POC is established based on physician order, dysphagia diagnosis and results of clinical assessment     Dysphagia therapy is not recommended     Conditions Requiring Skilled Therapeutic Intervention for dysphagia:    not applicable    Specific dysphagia interventions to include:     Not applicable    Specific instructions for next treatment:  not applicable   Patient Treatment Goals:    Short Term Goals:  Not applicable no therapy warranted     Long Term Goals:   Not applicable no therapy warranted      Patient/family Goal:    not applicable                    ADMITTING DIAGNOSIS: RADHA (acute kidney injury) [N17.9]    VISIT DIAGNOSIS:   Visit Diagnoses         Codes      Pleural effusion     J90             PATIENT REPORT/COMPLAINT: denies difficulty swallowing  nursing not available at time of evaluation chart reviewed and patient is not NPO for any procedures per current

## 2025-05-20 NOTE — FLOWSHEET NOTE
05/20/25 1300   Vital Signs   BP (!) 122/45   Pulse 82   Respirations 24   SpO2 93 %   Pain Assessment   Pain Assessment None - Denies Pain   Pain Level 0   Hemodialysis Central Access - Temporary Right Femoral vein   Placement Date/Time: 05/16/25 1400   Present on Admission/Arrival: No  Orientation: Right  Access Location: Femoral vein   Continued need for line? Yes   Site Assessment Clean, dry & intact   CVC Lumen Status Brisk blood return;Flushed   Blue Lumen Status Brisk blood return;Flushed   Red Lumen Status Brisk blood return;Flushed   Line Care Cap changed;Connections checked and tightened;Line pulled back;Ports disinfected   Dressing Type Antimicrobial;Transparent   Date of Last Dressing Change 05/19/25   Dressing Status Clean, dry & intact   Dressing Intervention Other (Comment)   Post-Hemodialysis Assessment   Post-Treatment Procedures Blood returned;Catheter capped, clamped and heparinized x 2 ports   Machine Disinfection Process Acid/Vinegar Clean;Heat Disinfect;Exterior Machine Disinfection   Rinseback Volume (ml) 300 ml   Blood Volume Processed (Liters) 79.9 L   Dialyzer Clearance Moderately streaked   Duration of Treatment (minutes) 240 minutes   Heparin Amount Administered During Treatment (mL) 0 mL   Hemodialysis Intake (ml) 300 ml   Hemodialysis Output (ml) 1300 ml   NET Removed (ml) 1000   Tolerated Treatment Good   Patient Response to Treatment pt tolerated tx w/o complications 1000mL removed hemo lines secure clear guard caps applied nurse to nurse report given to floor RN at bedside pt/vitals stable upon d/c dialysis RN   Patient Disposition Remain in ICU/ED

## 2025-05-20 NOTE — PLAN OF CARE
Problem: Safety - Adult  Goal: Free from fall injury  5/20/2025 1925 by Khalida Abraham RN  Outcome: Progressing  Flowsheets (Taken 5/20/2025 1900)  Free From Fall Injury: Instruct family/caregiver on patient safety     Problem: Chronic Conditions and Co-morbidities  Goal: Patient's chronic conditions and co-morbidity symptoms are monitored and maintained or improved  5/20/2025 1925 by Khalida Abraham RN  Outcome: Progressing     Problem: Discharge Planning  Goal: Discharge to home or other facility with appropriate resources  5/20/2025 1925 by Khalida Abraham RN  Outcome: Progressing     Problem: Skin/Tissue Integrity  Goal: Skin integrity remains intact  Description: 1.  Monitor for areas of redness and/or skin breakdown2.  Assess vascular access sites hourly3.  Every 4-6 hours minimum:  Change oxygen saturation probe site4.  Every 4-6 hours:  If on nasal continuous positive airway pressure, respiratory therapy assess nares and determine need for appliance change or resting period  5/20/2025 1925 by Khalida Abraham RN  Outcome: Progressing  Flowsheets (Taken 5/20/2025 1900)  Skin Integrity Remains Intact:   Monitor for areas of redness and/or skin breakdown   Turn and reposition as indicated   Assess need for specialty bed   Positioning devices   Pressure redistribution bed/mattress (bed type)   Check visual cues for pain     Problem: ABCDS Injury Assessment  Goal: Absence of physical injury  5/20/2025 1925 by Khalida Abraham RN  Outcome: Progressing  Flowsheets (Taken 5/20/2025 1900)  Absence of Physical Injury: Implement safety measures based on patient assessment     Problem: Neurosensory - Adult  Goal: Achieves stable or improved neurological status  5/20/2025 1925 by Khalida Abraham RN  Outcome: Progressing     Problem: Respiratory - Adult  Goal: Achieves optimal ventilation and oxygenation  5/20/2025 1925 by Khalida Abraham RN  Outcome: Progressing     Problem: Cardiovascular - Adult  Goal: Maintains optimal cardiac output and

## 2025-05-20 NOTE — PROGRESS NOTES
[facility]   Department of Internal Medicine   Internal Medicine Residency  MICU Progress Note    Patient:  Wander Rocha 69 y.o. male   MRN: [id number]      Room: [address] [place], [place] [address]    Admission date: 5/14/2025  2:21 PM ; Hospital day: 6   ICU day: 4d 12h      Allergy: Patient has no known allergies.    Subjective     Patient was seen and examined this morning at bedside. He is on 8 L oxygen via Venturi mask. Now not requiring vasopressors. He denies chest pain, SOB, N/V. Chest tube with 1,300 mL drained at bedside.     Objective       I & O - 24hr:    Intake/Output Summary (Last 24 hours) at 5/20/2025 0837  Last data filed at 5/20/2025 0600  Gross per 24 hour   Intake 1752.86 ml   Output 1115 ml   Net 637.86 ml     Net IO Since Admission: 2,538.91 mL [05/20/25 0837]    Physical Exam  /67   Pulse 60   Temp (!) 96.4 °F (35.8 °C) (Bladder)   Resp 18   Ht 1.702 m (5' 7\")   Wt 98.5 kg (217 lb 2.5 oz)   SpO2 97%   BMI 34.01 kg/m²   Ideal body weight: 66.1 kg (145 lb 11.6 oz)    General Appearance: alert, appears stated age, cooperative, and no distress. Chest tube 1300 mL. Prince with clear yellow urine.Venturi mask at 8 L.  HEENT:  dissolvable nasal packing in the left nare, dry blood  Lung: clear to auscultation bilaterally  Heart: S1, S2 normal and sinus rhythm , systolic murmur present  Abdomen:  distended, no tenderness, normal BS  Extremities:   no edema  Neurologic: alert and oriented x3      Medications     Continuous Infusions:   dextrose      insulin 1 Units/hr (05/20/25 0817)    dextrose 5 % and 0.45 % NaCl 50 mL/hr at 05/20/25 0809    VASOpressin 20 Units in sodium chloride 0.9 % 100 mL IVPB ([id number]) Stopped (05/18/25 1653)    norepinephrine Stopped (05/19/25 1157)    sodium chloride      sodium chloride      dextrose      sodium chloride       Scheduled Meds:   insulin glargine  20 Units SubCUTAneous Nightly    insulin lispro  6 Units SubCUTAneous TID WC    midodrine  20 mg Oral TID WC

## 2025-05-20 NOTE — CARE COORDINATION
Care Coordination LOS 6 day. Tx from 72 on 5/15/25 for acute respiratory failure., decompensated HF.  Venturi mask- Fio2 28, 4 ltr nc. R chest tube, Insulin gtt, ivf. P/nephrology- started on renal replacement therapy on 5/16/25 remains oliguric, creat rising.  Endocrine and diabetic education following as well.  Pt to IR for TDC.  Per prior Sw note, pt was skilled at [facility] with plan to return. Needs unclear, select following as well.    Electronically signed by Kamille Melendez RN on 5/20/2025 at 4:06 PM

## 2025-05-20 NOTE — PROGRESS NOTES
[facility]  Internal Medicine Residency Program  Progress Note - House Team 2    Patient:  Wander Rocha 69 y.o. male MRN: [id number]     Date of Service: 5/21/2025     CC: shortness of breath    Days since admission: 7    Subjective     Overnight events:   Progressively worsening mentation - STAT CT Head, ABG    He was seen and examined at bedside this morning. He was awake, alert, and oriented. He had no acute concerns today. He is on 5 L/min via Venturi mask. He remained hemodynamically stable off pressors. He had a bowel movement last night.     Objective     VS: BP (!) 121/36   Pulse 75   Temp 98.5 °F (36.9 °C) (Bladder)   Resp (!) 54   Ht 1.702 m (5' 7\")   Wt 98.5 kg (217 lb 2.5 oz)   SpO2 98%   BMI 34.01 kg/m²   ABP (Arterial line BP): (!) 68/57  ABP Mean (Arterial Line Mean): (!) 64 mmHg  I & O - 24hr:   Intake/Output Summary (Last 24 hours) at 5/21/2025 1413  Last data filed at 5/21/2025 0800  Gross per 24 hour   Intake 1919.28 ml   Output 1200 ml   Net 719.28 ml     Net IO Since Admission: 2,048.19 mL [05/21/25 1413]    Physical Exam:  General Appearance: in mild distress, on Venturi mask  HEENT: Normocephalic, atraumatic, (+) tunneled HD line, right neck  Neck: midline trachea  Lung: crackles L>R, (+) chest tube on the right lung   Heart: regular rate and rhythm, no murmur  Abdomen: soft, (+) distended abdomen   Extremities: no cyanosis or edema  Musculokeletal: No joint swelling  Neurologic: Mental status: awake, alert, oriented, and thought content appropriate    Interventions:  Procedures:   Chest tube insertion, right lung (05/18/2025)    Airway: Venturi mask     Medications     Continuous Infusions:   dextrose      insulin Stopped (05/20/25 1009)    VASOpressin 20 Units in sodium chloride 0.9 % 100 mL IVPB ([id number]) Stopped (05/18/25 1653)    norepinephrine Stopped (05/19/25 1157)    sodium chloride      sodium chloride      dextrose      sodium chloride       Scheduled  Meds:   metOLazone  5 mg Oral Daily    insulin glargine  20 Units SubCUTAneous Nightly    insulin lispro  0-4 Units SubCUTAneous 4x Daily AC & HS    midodrine  20 mg Oral TID WC    lidocaine  1 patch TransDERmal Daily    [Held by provider] lactulose  20 g Oral TID    [Held by provider] rifAXIMin  400 mg Oral TID    piperacillin-tazobactam  4,500 mg IntraVENous Q12H    ipratropium 0.5 mg-albuterol 2.5 mg  1 Dose Inhalation Q4H WA RT    [Held by provider] sevelamer  0.8 g Oral TID     sodium chloride  1 spray Each Nostril TID    [Held by provider] metoprolol succinate  12.5 mg Oral Daily    epoetin lay-epbx  3,000 Units SubCUTAneous Once per day on Tuesday Thursday Saturday    sodium chloride flush  5-40 mL IntraVENous 2 times per day    [Held by provider] acetaZOLAMIDE  250 mg Oral Daily    [Held by provider] allopurinol  200 mg Oral Daily    apixaban  5 mg Oral BID    aspirin  81 mg Oral Daily    atorvastatin  40 mg Oral Daily    bumetanide  2 mg Oral BID    vitamin D  2,000 Units Oral Daily    levothyroxine  100 mcg Oral Daily    levothyroxine  50 mcg Oral Daily    [Held by provider] lisinopril  5 mg Oral Daily    sertraline  50 mg Oral Daily    [Held by provider] tamsulosin  0.4 mg Oral Daily     PRN Meds: heparin (porcine), acetaminophen, HYDROcodone 5 mg - acetaminophen, dextrose bolus **OR** dextrose bolus, magnesium sulfate, sodium phosphate 15 mmol in sodium chloride 0.9 % 250 mL IVPB, dextrose, HYDROmorphone, sodium chloride, sodium chloride flush, sodium chloride, glucose, dextrose bolus **OR** dextrose bolus, glucagon (rDNA), dextrose, sodium chloride flush, sodium chloride, ondansetron **OR** ondansetron, polyethylene glycol, acetaminophen **OR** acetaminophen    Labs     Recent Labs     05/19/25  0544 05/20/25  0402 05/21/25  0423   WBC 12.4* 12.6* 17.0*   HGB 8.2* 8.0* 7.9*   HCT 25.9* 26.1* 25.8*   MCV 95.9 96.7 97.4    206 225     Recent Labs     05/20/25  0402 05/20/25  0839 05/21/25  0423

## 2025-05-20 NOTE — PRE SEDATION
Sedation Pre-Procedure Note    Patient Name: Wander Rocha   YOB: 1955  Room/Bed: 4431/4431-A  Medical Record Number: [id number]  Date: 5/20/2025   Time: 6:03 PM       Indication: end stage renal disease    Consent: I have discussed with the patient and/or the patient representative the indication, alternatives, and the possible risks and/or complications of the planned procedure and the anesthesia methods. The patient and/or patient representative appear to understand and agree to proceed.    Vital Signs:   Vitals:    05/20/25 1800   BP: 97/76   Pulse: 65   Resp: 18   Temp:    SpO2: 94%       Past Medical History:   has a past medical history of RADHA (acute kidney injury), Atrial fibrillation (HCC), Carpal tunnel syndrome, Colorectal polyps, Diverticula of colon, Encounter regarding vascular access for dialysis for ESRD ([facility]), Hyperlipidemia, Hypertension, Hypothyroidism, Lung nodule, Lung nodules, Nocturnal hypoxemia due to obesity, Obesity, NIKO (obstructive sleep apnea), Osteoarthritis, Pacemaker, Pacemaker, Pinched nerve, Restrictive lung disease secondary to obesity, S/P laminectomy with spinal fusion, Sinus node dysfunction (HCC), Skin lesion of face, and Type II or unspecified type diabetes mellitus without mention of complication, not stated as uncontrolled.    Past Surgical History:   has a past surgical history that includes Colonoscopy (2007); back surgery (2012); eye surgery; hernia repair; Colonoscopy (06/04/2012); back surgery (05/30/2017); pacemaker placement (10/03/2017); Rotator cuff repair (Right); Colonoscopy (04/26/2022); Colonoscopy (N/A, 4/26/2022); back surgery (N/A, 11/8/2022); back surgery (Left, 1/26/2023); ep device procedure (N/A, 4/17/2025); and ep device procedure (N/A, 4/23/2025).    Medications:   Scheduled Meds:    insulin glargine  20 Units SubCUTAneous Nightly    insulin lispro  6 Units SubCUTAneous TID WC    midodrine  20 mg Oral TID WC    lidocaine  1 patch TransDERmal

## 2025-05-20 NOTE — PROGRESS NOTES
Date: 5/20/2025    Time: 12:07 AM    Patient Placed On BIPAP/CPAP/ Non-Invasive Ventilation?  Yes    If no must comment.  Facial area red/color change? No           If YES are Blister/Lesion present?no       If yes must notify nursing staff  BIPAP/CPAP skin barrier?  Yes    Skin barrier type:mepilexlite       Comments:        Ro Ross RCP

## 2025-05-20 NOTE — PROGRESS NOTES
Consult received for diabetes education. Chart reviewed. Pt not appropriate for education at this time. Theresa CHAU suggests a later time would be more beneficial. Will follow chart and see this week.    Electronically signed by Barbara Rodriguez RN on 5/20/2025 at 11:05 AM

## 2025-05-20 NOTE — CONSULTS
ENDOCRINOLOGY INITIAL CONSULTATION NOTE      Date of admission: 5/14/2025  Date of service: 5/19/2025  Admitting physician: Martell Rosenberg MD   Primary Care Physician: Oly Crespo MD  Consultant physician: Luiz Luciano MD     Reason for the consultation:  Uncontrolled DM    History of Present Illness:  The history is provided by the patient. Accuracy of the patient data is excellent    Wander Rocha is a very pleasant 69 y.o. old male with PMH of poorly controlled type 2 diabetes, CKD, A-fib on oral anticoagulation, primary hypothyroidism, dyslipidemia and other listed below admitted to [facility] on 5/14/2025 because of RADHA, endocrine service was consulted for diabetes management.  During the hospital stay, the patient developed euglycemic DKA and started on insulin drip    Prior to admission  The patient was diagnosed with type 2 diabetes long time ago.  Prior to admission he was on Jardiance 10 daily, Lantus 25 units twice daily.  Patient was following diabetic diet prior to the admission.  His reading has been highly variable.  He reports both macrovascular and microvascular diabetes complications his A1c 7.4% but he is anemic  Lab Results   Component Value Date/Time    LABA1C 7.4 05/18/2025 04:11 AM     Point of care glucose monitoring   (Independently reviewed)   Recent Labs     05/19/25  1513 05/19/25  1627 05/19/25  1714 05/19/25  1811 05/19/25  1912 05/19/25  2007 05/19/25  2103 05/19/25  2145   POCGLU 182* 160* 163* 162* 160* 141* 152* 158*       Past medical history:   Past Medical History:   Diagnosis Date    RADHA (acute kidney injury) 03/10/2022    Atrial fibrillation (HCC)     Carpal tunnel syndrome     Colorectal polyps 04/15/2013    Diverticula of colon 04/15/2013    Encounter regarding vascular access for dialysis for ESRD (HCC) 03/12/2022    Hyperlipidemia     Hypertension     Hypothyroidism     Lung nodule     Lung nodules 04/15/2013    Nocturnal hypoxemia due to obesity 08/08/2013    Obesity

## 2025-05-20 NOTE — PROGRESS NOTES
Department of Internal Medicine  Nephrology Attending Consult Note      Events reviewed.    SUBJECTIVE: We are following Mr. Wander Rocha for CKD.  Reports no new complaints.    PHYSICAL EXAM:      Vitals:    VITALS:  BP (!) 113/58   Pulse 69   Temp 98.5 °F (36.9 °C) (Bladder)   Resp 16   Ht 1.702 m (5' 7\")   Wt 98.5 kg (217 lb 2.5 oz)   SpO2 95%   BMI 34.01 kg/m²   24HR INTAKE/OUTPUT:    Intake/Output Summary (Last 24 hours) at 5/20/2025 1142  Last data filed at 5/20/2025 1000  Gross per 24 hour   Intake 1197.63 ml   Output 1225 ml   Net -27.37 ml       Constitutional:  Awake, alert, oriented, in NAD  HEENT:  PERRLA, normocephalic, atraumatic  Respiratory:  wheezes  Cardiovascular/Edema:  RRR, normal S1, normal S2, + edema  Gastrointestinal: Distended  Neurologic:  Nonfocal  Skin:  warm, dry, no rashes, no lesions    Scheduled Meds:   insulin glargine  20 Units SubCUTAneous Nightly    insulin lispro  6 Units SubCUTAneous TID WC    midodrine  20 mg Oral TID WC    lidocaine  1 patch TransDERmal Daily    [Held by provider] lactulose  20 g Oral TID    [Held by provider] rifAXIMin  400 mg Oral TID    piperacillin-tazobactam  4,500 mg IntraVENous Q12H    ipratropium 0.5 mg-albuterol 2.5 mg  1 Dose Inhalation Q4H WA RT    [Held by provider] sevelamer  0.8 g Oral TID WC    sodium chloride  1 spray Each Nostril TID    [Held by provider] metoprolol succinate  12.5 mg Oral Daily    epoetin lay-epbx  3,000 Units SubCUTAneous Once per day on Tuesday Thursday Saturday    sodium chloride flush  5-40 mL IntraVENous 2 times per day    [Held by provider] acetaZOLAMIDE  250 mg Oral Daily    [Held by provider] allopurinol  200 mg Oral Daily    [Held by provider] apixaban  5 mg Oral BID    [Held by provider] aspirin  81 mg Oral Daily    atorvastatin  40 mg Oral Daily    [Held by provider] bumetanide  2 mg Oral BID    vitamin D  2,000 Units Oral Daily    levothyroxine  100 mcg Oral Daily    levothyroxine  50 mcg Oral Daily     [Held by provider] lisinopril  5 mg Oral Daily    sertraline  50 mg Oral Daily    [Held by provider] tamsulosin  0.4 mg Oral Daily    [Held by provider] bumetanide  2 mg IntraVENous BID     Continuous Infusions:   dextrose      insulin Stopped (05/20/25 1043)    dextrose 5 % and 0.45 % NaCl Stopped (05/20/25 1043)    VASOpressin 20 Units in sodium chloride 0.9 % 100 mL IVPB ([id number]) Stopped (05/18/25 1653)    norepinephrine Stopped (05/19/25 1157)    sodium chloride      sodium chloride      dextrose      sodium chloride       PRN Meds:.heparin (porcine), dextrose bolus **OR** dextrose bolus, potassium chloride, magnesium sulfate, sodium phosphate 15 mmol in sodium chloride 0.9 % 250 mL IVPB, dextrose, HYDROmorphone, sodium chloride, sodium chloride flush, sodium chloride, glucose, dextrose bolus **OR** dextrose bolus, glucagon (rDNA), dextrose, sodium chloride flush, sodium chloride, ondansetron **OR** ondansetron, polyethylene glycol, acetaminophen **OR** acetaminophen      DATA:    CBC:   Lab Results   Component Value Date/Time    WBC 12.6 05/20/2025 04:02 AM    RBC 2.70 05/20/2025 04:02 AM    HGB 8.0 05/20/2025 04:02 AM    HCT 26.1 05/20/2025 04:02 AM    MCV 96.7 05/20/2025 04:02 AM    MCH 29.6 05/20/2025 04:02 AM    MCHC 30.7 05/20/2025 04:02 AM    RDW 17.4 05/20/2025 04:02 AM     05/20/2025 04:02 AM    MPV 9.6 05/20/2025 04:02 AM     CMP:    Lab Results   Component Value Date/Time     05/20/2025 08:39 AM    K 5.1 05/20/2025 08:39 AM    K 5.1 01/08/2023 05:35 AM    CL 98 05/20/2025 08:39 AM    CO2 22 05/20/2025 08:39 AM    BUN 74 05/20/2025 08:39 AM    CREATININE 3.3 05/20/2025 08:39 AM    GFRAA >60 10/03/2022 10:55 AM    LABGLOM 19 05/20/2025 08:39 AM    LABGLOM 60 04/19/2024 10:45 AM    GLUCOSE 188 05/20/2025 08:39 AM    GLUCOSE 133 04/18/2012 08:43 AM    CALCIUM 8.1 05/20/2025 08:39 AM    BILITOT 0.6 05/20/2025 04:02 AM    ALKPHOS 60 05/20/2025 04:02 AM    AST 25 05/20/2025 04:02 AM    ALT 23

## 2025-05-20 NOTE — PROGRESS NOTES
OT consult received and appreciated. Chart reviewed, discussed at AM rounds and consulted RN. Pt on dialysis in AM, hold for IR in PM for HD cath. Will evaluate at a later time.     Dianna Goff, OTR/L # [id number]

## 2025-05-21 ENCOUNTER — APPOINTMENT (OUTPATIENT)
Dept: GENERAL RADIOLOGY | Age: 70
DRG: 871 | End: 2025-05-21
Payer: MEDICARE

## 2025-05-21 ENCOUNTER — APPOINTMENT (OUTPATIENT)
Dept: CT IMAGING | Age: 70
DRG: 871 | End: 2025-05-21
Payer: MEDICARE

## 2025-05-21 LAB
ALBUMIN SERPL-MCNC: 3.9 G/DL (ref 3.5–5.2)
ALP SERPL-CCNC: 60 U/L (ref 40–129)
ALT SERPL-CCNC: 25 U/L (ref 0–50)
AMMONIA PLAS-SCNC: 29 UMOL/L (ref 16–60)
ANION GAP SERPL CALCULATED.3IONS-SCNC: 13 MMOL/L (ref 7–16)
AST SERPL-CCNC: 29 U/L (ref 0–50)
BASOPHILS # BLD: 0 K/UL (ref 0–0.2)
BASOPHILS NFR BLD: 0 % (ref 0–2)
BILIRUB SERPL-MCNC: 0.7 MG/DL (ref 0–1.2)
BUN SERPL-MCNC: 37 MG/DL (ref 8–23)
CALCIUM SERPL-MCNC: 8.4 MG/DL (ref 8.8–10.2)
CHLORIDE SERPL-SCNC: 99 MMOL/L (ref 98–107)
CO2 SERPL-SCNC: 25 MMOL/L (ref 22–29)
CREAT SERPL-MCNC: 2.7 MG/DL (ref 0.7–1.2)
EOSINOPHIL # BLD: 0.3 K/UL (ref 0.05–0.5)
EOSINOPHILS RELATIVE PERCENT: 2 % (ref 0–6)
ERYTHROCYTE [DISTWIDTH] IN BLOOD BY AUTOMATED COUNT: 17.5 % (ref 11.5–15)
GFR, ESTIMATED: 24 ML/MIN/1.73M2
GLUCOSE BLD-MCNC: 136 MG/DL (ref 74–99)
GLUCOSE BLD-MCNC: 141 MG/DL (ref 74–99)
GLUCOSE BLD-MCNC: 162 MG/DL (ref 74–99)
GLUCOSE BLD-MCNC: 92 MG/DL (ref 74–99)
GLUCOSE BLD-MCNC: 93 MG/DL (ref 74–99)
GLUCOSE SERPL-MCNC: 92 MG/DL (ref 74–99)
HCT VFR BLD AUTO: 25.8 % (ref 37–54)
HGB BLD-MCNC: 7.9 G/DL (ref 12.5–16.5)
LYMPHOCYTES NFR BLD: 0.89 K/UL (ref 1.5–4)
LYMPHOCYTES RELATIVE PERCENT: 5 % (ref 20–42)
MAGNESIUM SERPL-MCNC: 2 MG/DL (ref 1.6–2.4)
MCH RBC QN AUTO: 29.8 PG (ref 26–35)
MCHC RBC AUTO-ENTMCNC: 30.6 G/DL (ref 32–34.5)
MCV RBC AUTO: 97.4 FL (ref 80–99.9)
METAMYELOCYTES ABSOLUTE COUNT: 0.44 K/UL (ref 0–0.12)
METAMYELOCYTES: 3 % (ref 0–1)
MICROORGANISM SPEC CULT: NORMAL
MICROORGANISM SPEC CULT: NORMAL
MONOCYTES NFR BLD: 1.92 K/UL (ref 0.1–0.95)
MONOCYTES NFR BLD: 11 % (ref 2–12)
MYELOCYTES ABSOLUTE COUNT: 0.89 K/UL
MYELOCYTES: 5 %
NEUTROPHILS NFR BLD: 72 % (ref 43–80)
NEUTS SEG NFR BLD: 12.27 K/UL (ref 1.8–7.3)
NUCLEATED RED BLOOD CELLS: 4 PER 100 WBC
PHOSPHATE SERPL-MCNC: 3 MG/DL (ref 2.5–4.5)
PLATELET # BLD AUTO: 225 K/UL (ref 130–450)
PMV BLD AUTO: 9.9 FL (ref 7–12)
POTASSIUM SERPL-SCNC: 4 MMOL/L (ref 3.5–5.1)
PROMYELOCYTES ABSOLUTE COUNT: 0.3 K/UL
PROMYELOCYTES: 2 %
PROT SERPL-MCNC: 6.5 G/DL (ref 6.4–8.3)
RBC # BLD AUTO: 2.65 M/UL (ref 3.8–5.8)
RBC # BLD: ABNORMAL 10*6/UL
REPORT STATUS: NORMAL
SERVICE CMNT-IMP: NORMAL
SERVICE CMNT-IMP: NORMAL
SODIUM SERPL-SCNC: 137 MMOL/L (ref 136–145)
SPECIMEN DESCRIPTION: NORMAL
SPECIMEN DESCRIPTION: NORMAL
WBC OTHER # BLD: 17 K/UL (ref 4.5–11.5)

## 2025-05-21 PROCEDURE — 82962 GLUCOSE BLOOD TEST: CPT

## 2025-05-21 PROCEDURE — 6370000000 HC RX 637 (ALT 250 FOR IP): Performed by: RADIOLOGY

## 2025-05-21 PROCEDURE — 6370000000 HC RX 637 (ALT 250 FOR IP)

## 2025-05-21 PROCEDURE — 94640 AIRWAY INHALATION TREATMENT: CPT

## 2025-05-21 PROCEDURE — 70450 CT HEAD/BRAIN W/O DYE: CPT

## 2025-05-21 PROCEDURE — 84100 ASSAY OF PHOSPHORUS: CPT

## 2025-05-21 PROCEDURE — 1200000000 HC SEMI PRIVATE

## 2025-05-21 PROCEDURE — 90935 HEMODIALYSIS ONE EVALUATION: CPT

## 2025-05-21 PROCEDURE — 99232 SBSQ HOSP IP/OBS MODERATE 35: CPT | Performed by: INTERNAL MEDICINE

## 2025-05-21 PROCEDURE — 94660 CPAP INITIATION&MGMT: CPT

## 2025-05-21 PROCEDURE — 6360000002 HC RX W HCPCS

## 2025-05-21 PROCEDURE — 71045 X-RAY EXAM CHEST 1 VIEW: CPT

## 2025-05-21 PROCEDURE — 82140 ASSAY OF AMMONIA: CPT

## 2025-05-21 PROCEDURE — 2500000003 HC RX 250 WO HCPCS

## 2025-05-21 PROCEDURE — 6370000000 HC RX 637 (ALT 250 FOR IP): Performed by: INTERNAL MEDICINE

## 2025-05-21 PROCEDURE — 51798 US URINE CAPACITY MEASURE: CPT

## 2025-05-21 PROCEDURE — 83735 ASSAY OF MAGNESIUM: CPT

## 2025-05-21 PROCEDURE — 2700000000 HC OXYGEN THERAPY PER DAY

## 2025-05-21 PROCEDURE — 2580000003 HC RX 258

## 2025-05-21 PROCEDURE — 99291 CRITICAL CARE FIRST HOUR: CPT | Performed by: INTERNAL MEDICINE

## 2025-05-21 PROCEDURE — 85025 COMPLETE CBC W/AUTO DIFF WBC: CPT

## 2025-05-21 PROCEDURE — 74176 CT ABD & PELVIS W/O CONTRAST: CPT

## 2025-05-21 PROCEDURE — 80053 COMPREHEN METABOLIC PANEL: CPT

## 2025-05-21 PROCEDURE — 36556 INSERT NON-TUNNEL CV CATH: CPT

## 2025-05-21 RX ORDER — BUMETANIDE 1 MG/1
2 TABLET ORAL DAILY
Status: DISCONTINUED | OUTPATIENT
Start: 2025-05-21 | End: 2025-05-21 | Stop reason: SDUPTHER

## 2025-05-21 RX ORDER — METOLAZONE 5 MG/1
5 TABLET ORAL DAILY
Status: DISCONTINUED | OUTPATIENT
Start: 2025-05-21 | End: 2025-05-28

## 2025-05-21 RX ORDER — INSULIN GLARGINE 100 [IU]/ML
15 INJECTION, SOLUTION SUBCUTANEOUS NIGHTLY
Status: DISCONTINUED | OUTPATIENT
Start: 2025-05-21 | End: 2025-05-26

## 2025-05-21 RX ADMIN — MIDODRINE HYDROCHLORIDE 20 MG: 10 TABLET ORAL at 12:04

## 2025-05-21 RX ADMIN — MIDODRINE HYDROCHLORIDE 20 MG: 10 TABLET ORAL at 07:53

## 2025-05-21 RX ADMIN — SERTRALINE 50 MG: 50 TABLET, FILM COATED ORAL at 07:53

## 2025-05-21 RX ADMIN — SALINE NASAL SPRAY 1 SPRAY: 1.5 SOLUTION NASAL at 16:27

## 2025-05-21 RX ADMIN — LEVOTHYROXINE SODIUM 50 MCG: 0.05 TABLET ORAL at 06:49

## 2025-05-21 RX ADMIN — IPRATROPIUM BROMIDE AND ALBUTEROL SULFATE 1 DOSE: 2.5; .5 SOLUTION RESPIRATORY (INHALATION) at 08:16

## 2025-05-21 RX ADMIN — LEVOTHYROXINE SODIUM 100 MCG: 0.1 TABLET ORAL at 06:48

## 2025-05-21 RX ADMIN — IPRATROPIUM BROMIDE AND ALBUTEROL SULFATE 1 DOSE: 2.5; .5 SOLUTION RESPIRATORY (INHALATION) at 20:41

## 2025-05-21 RX ADMIN — BUMETANIDE 2 MG: 1 TABLET ORAL at 16:24

## 2025-05-21 RX ADMIN — APIXABAN 5 MG: 5 TABLET, FILM COATED ORAL at 20:35

## 2025-05-21 RX ADMIN — INSULIN GLARGINE 15 UNITS: 100 INJECTION, SOLUTION SUBCUTANEOUS at 20:35

## 2025-05-21 RX ADMIN — SODIUM CHLORIDE, PRESERVATIVE FREE 10 ML: 5 INJECTION INTRAVENOUS at 20:35

## 2025-05-21 RX ADMIN — SALINE NASAL SPRAY 1 SPRAY: 1.5 SOLUTION NASAL at 07:54

## 2025-05-21 RX ADMIN — ASPIRIN 81 MG: 81 TABLET, COATED ORAL at 07:53

## 2025-05-21 RX ADMIN — IPRATROPIUM BROMIDE AND ALBUTEROL SULFATE 1 DOSE: 2.5; .5 SOLUTION RESPIRATORY (INHALATION) at 13:07

## 2025-05-21 RX ADMIN — PIPERACILLIN AND TAZOBACTAM 4500 MG: 4; .5 INJECTION, POWDER, LYOPHILIZED, FOR SOLUTION INTRAVENOUS at 16:32

## 2025-05-21 RX ADMIN — MIDODRINE HYDROCHLORIDE 20 MG: 10 TABLET ORAL at 16:24

## 2025-05-21 RX ADMIN — ATORVASTATIN CALCIUM 40 MG: 40 TABLET, FILM COATED ORAL at 07:53

## 2025-05-21 RX ADMIN — SALINE NASAL SPRAY 1 SPRAY: 1.5 SOLUTION NASAL at 20:35

## 2025-05-21 RX ADMIN — METOLAZONE 5 MG: 5 TABLET ORAL at 12:04

## 2025-05-21 RX ADMIN — APIXABAN 5 MG: 5 TABLET, FILM COATED ORAL at 07:53

## 2025-05-21 RX ADMIN — SODIUM CHLORIDE, PRESERVATIVE FREE 10 ML: 5 INJECTION INTRAVENOUS at 07:54

## 2025-05-21 RX ADMIN — Medication 2000 UNITS: at 07:53

## 2025-05-21 RX ADMIN — PIPERACILLIN AND TAZOBACTAM 4500 MG: 4; .5 INJECTION, POWDER, LYOPHILIZED, FOR SOLUTION INTRAVENOUS at 02:47

## 2025-05-21 RX ADMIN — HYDROCODONE BITARTRATE AND ACETAMINOPHEN 1 TABLET: 5; 325 TABLET ORAL at 05:03

## 2025-05-21 ASSESSMENT — PAIN DESCRIPTION - LOCATION
LOCATION: BACK;NECK;SHOULDER
LOCATION: SHOULDER

## 2025-05-21 ASSESSMENT — PAIN SCALES - GENERAL
PAINLEVEL_OUTOF10: 8
PAINLEVEL_OUTOF10: 5
PAINLEVEL_OUTOF10: 0
PAINLEVEL_OUTOF10: 0

## 2025-05-21 ASSESSMENT — PAIN - FUNCTIONAL ASSESSMENT: PAIN_FUNCTIONAL_ASSESSMENT: PREVENTS OR INTERFERES SOME ACTIVE ACTIVITIES AND ADLS

## 2025-05-21 ASSESSMENT — PAIN DESCRIPTION - ONSET: ONSET: SUDDEN

## 2025-05-21 ASSESSMENT — PAIN DESCRIPTION - ORIENTATION: ORIENTATION: RIGHT;LOWER

## 2025-05-21 ASSESSMENT — PAIN DESCRIPTION - FREQUENCY: FREQUENCY: INTERMITTENT

## 2025-05-21 ASSESSMENT — PAIN DESCRIPTION - DESCRIPTORS: DESCRIPTORS: ACHING;DISCOMFORT;THROBBING

## 2025-05-21 ASSESSMENT — PAIN DESCRIPTION - PAIN TYPE: TYPE: ACUTE PAIN

## 2025-05-21 NOTE — FLOWSHEET NOTE
05/21/25 1550   Treatment   Time On 1346   Time Off 1546   Treatment Goal 3000   Observations & Evaluations   Level of Consciousness 0   Oriented X 3   Heart Rhythm Regular   Respiratory Quality/Effort Unlabored   O2 Device High flow nasal cannula   Bilateral Breath Sounds Diminished   Skin Color Pale   Skin Condition/Temp Dry;Warm   Edema Generalized   Vital Signs   BP (!) 119/57   Temp 98.5 °F (36.9 °C)   Pulse 74   Respirations 24   SpO2 100 %   Weight - Scale 97.8 kg (215 lb 9.8 oz)   Weight Method Bed scale   Percent Weight Change -0.71   Pain Assessment   Pain Assessment None - Denies Pain   Pain Level 0   Patient's Stated Pain Goal 0 - No pain   Post-Hemodialysis Assessment   Post-Treatment Procedures Blood returned;Catheter capped, clamped with Citrate x 2 ports   Machine Disinfection Process Acid/Vinegar Clean;Heat Disinfect;Exterior Machine Disinfection   Rinseback Volume (ml) 300 ml   Blood Volume Processed (Liters) 0 L   Dialyzer Clearance Clear   Duration of Treatment (minutes) 120 minutes   Hemodialysis Intake (ml) 300 ml   Hemodialysis Output (ml) 3300 ml   NET Removed (ml) 3000   Tolerated Treatment Good   Patient Response to Treatment Patient tolerated procedure well. UF 3000 mL NET   Patient Disposition Remain in ICU/ED   Handoff   Communication Given Transfer Handoff   Handoff Given To Theresa Melendez RN   Handoff Received From Elmira Mantilla RN   Handoff Communication Face to Face   Time Handoff Given 1550

## 2025-05-21 NOTE — PROGRESS NOTES
Comprehensive Nutrition Assessment    Type and Reason for Visit:  Initial, LOS    Nutrition Recommendations/Plan:   Continue current diet  Start Glucerna BID     Malnutrition Assessment:  Malnutrition Status:  At risk for malnutrition (05/21/25 1407)    Context:  Acute Illness     Findings of the 6 clinical characteristics of malnutrition:  Energy Intake:  75% or less of estimated energy requirements for 7 or more days  Weight Loss:  Unable to assess (d/t fluid shifts)     Body Fat Loss:  No body fat loss     Muscle Mass Loss:  No muscle mass loss    Fluid Accumulation:  No fluid accumulation     Strength:  Not Performed    Nutrition Assessment:    Pt admit from [facility]CF d/t RADHA. Noted recent admit w/ SOB/RADHA, persistent Afib s/p pacemaker upgrade. PMHx DM, CHF, Afib, COPD, CKD, HTN, HLD, chronic anemia, gout. Pt noted w/ recurrent epistaxis s/p chemical cauterization. Noted worsening dyspnea w/ need for Bipap & transferred to MICU for closer monitoring. Noted shock, likely septic, found to have B/L pleural effusions s/p CT 5/18. HD initiated 5/16. Course complicate d by euglycemic DKA. Noted chocking on food pending MBS. Will start ONS and continue to monitor/adjust ONS per SLP recs.    Nutrition Related Findings:    pt alert/disoriented at times, active BS, abd tenderness, CT x1, no edema, +I/Os, HD (K/Phos WNL), hyperglycemia Wound Type: Surgical Incision       Current Nutrition Intake & Therapies:    Average Meal Intake: 1-25%  Average Supplements Intake: None Ordered  ADULT DIET; Regular    Anthropometric Measures:  Height: 170.2 cm (5' 7\")  Ideal Body Weight (IBW): 148 lbs (67 kg)    Admission Body Weight: 99.3 kg (219 lb) (5/15 bed scale)  Current Body Weight: 98.4 kg (217 lb) (5/19 bed scale), 146.6 % IBW.    Current BMI (kg/m2): 34  Usual Body Weight: 91.2 kg (201 lb) (5/2024 standing scale, per EMR w/ noted fluctuations)     % Weight Change (Calculated): 8                    BMI Categories: Obese Class 1

## 2025-05-21 NOTE — PROGRESS NOTES
SPEECH LANGUAGE PATHOLOGY  DAILY PROGRESS NOTE        PATIENT NAME:  Wander Rocha      ROOM:  4431/4431-A :  1955         TODAY'S DATE:  2025       Pt receiving dialysis.  Will complete MBSS tomorrow

## 2025-05-21 NOTE — PROGRESS NOTES
[facility]   Department of Internal Medicine   Internal Medicine Residency  MICU Progress Note    Patient:  Wander Rocha 69 y.o. male   MRN: [id number]      Room: [address] [place], [place] [address]    Admission date: 5/14/2025  2:21 PM ; Hospital day: 7   ICU day: 5d 11h      Allergy: Patient has no known allergies.    Subjective     Patient was seen and examined this morning at bedside. He is on 2 L oxygen via humidified HFNC. He denies chest pain, SOB, N/V. Chest tube with 310 mL drained at bedside. He was lethargic and confused overnight after the IR procedure, stat CT head revealed no acute intracranial abnormalities. This morning, he is alert and oriented x3.    Objective       I & O - 24hr:    Intake/Output Summary (Last 24 hours) at 5/21/2025 0803  Last data filed at 5/21/2025 0653  Gross per 24 hour   Intake 2219.28 ml   Output 2620 ml   Net -400.72 ml     Net IO Since Admission: 2,063.19 mL [05/21/25 0803]    Physical Exam  BP (!) 109/49   Pulse 73   Temp 98.1 °F (36.7 °C) (Bladder)   Resp 29   Ht 1.702 m (5' 7\")   Wt 98.5 kg (217 lb 2.5 oz)   SpO2 95%   BMI 34.01 kg/m²   Ideal body weight: 66.1 kg (145 lb 11.6 oz)    General Appearance: alert, appears stated age, cooperative, and no distress. Chest tube 310 mL. Prince with clear yellow urine. HFNC at 2 L.  HEENT: nose with no external bleeding  Lung: clear to auscultation bilaterally  Heart: S1, S2 normal and sinus rhythm , systolic murmur present  Abdomen:  distended, no tenderness, normal BS  Extremities:   LE edema 2-3+   Neurologic: alert and oriented x3      Medications     Continuous Infusions:   dextrose      insulin Stopped (05/20/25 1009)    dextrose 5 % and 0.45 % NaCl Stopped (05/20/25 1009)    VASOpressin 20 Units in sodium chloride 0.9 % 100 mL IVPB ([id number]) Stopped (05/18/25 1653)    norepinephrine Stopped (05/19/25 1157)    sodium chloride      sodium chloride      dextrose      sodium chloride       Scheduled Meds:   insulin glargine  20

## 2025-05-21 NOTE — PROGRESS NOTES
ENDOCRINOLOGY PROGRESS NOTE      Date of admission: 5/14/2025  Date of service: 5/20/2025  Admitting physician: Martell Rosenberg MD   Primary Care Physician: Oly Crespo MD  Consultant physician: Luiz Luciano MD     Reason for the consultation:  Uncontrolled DM    History of Present Illness:  The history is provided by the patient. Accuracy of the patient data is excellent    Wander Rocha is a very pleasant 69 y.o. old male with PMH of poorly controlled type 2 diabetes, CKD, A-fib on oral anticoagulation, primary hypothyroidism, dyslipidemia and other listed below admitted to [facility] on 5/14/2025 because of RADHA, endocrine service was consulted for diabetes management.     Subjective  The patient was seen this afternoon, no acute events overnight, transitioned off insulin drip    Point of care glucose monitoring   (Independently reviewed)   Recent Labs     05/20/25  0314 05/20/25  0409 05/20/25  0510 05/20/25  0710 05/20/25  0811 05/20/25  0917 05/20/25  1249 05/20/25  1717   POCGLU 171* 175* 171* 188* 185* 184* 122* 117*      Allergy and drug reactions:   No Known Allergies    Scheduled Meds:   insulin glargine  20 Units SubCUTAneous Nightly    insulin lispro  6 Units SubCUTAneous TID WC    midodrine  20 mg Oral TID WC    lidocaine  1 patch TransDERmal Daily    [Held by provider] lactulose  20 g Oral TID    [Held by provider] rifAXIMin  400 mg Oral TID    piperacillin-tazobactam  4,500 mg IntraVENous Q12H    ipratropium 0.5 mg-albuterol 2.5 mg  1 Dose Inhalation Q4H WA RT    [Held by provider] sevelamer  0.8 g Oral TID WC    sodium chloride  1 spray Each Nostril TID    [Held by provider] metoprolol succinate  12.5 mg Oral Daily    epoetin lay-epbx  3,000 Units SubCUTAneous Once per day on Tuesday Thursday Saturday    sodium chloride flush  5-40 mL IntraVENous 2 times per day    [Held by provider] acetaZOLAMIDE  250 mg Oral Daily    [Held by provider] allopurinol  200 mg Oral Daily    apixaban  5 mg Oral BID

## 2025-05-21 NOTE — PLAN OF CARE
Problem: Safety - Adult  Goal: Free from fall injury  Outcome: Progressing     Problem: Chronic Conditions and Co-morbidities  Goal: Patient's chronic conditions and co-morbidity symptoms are monitored and maintained or improved  Outcome: Progressing  Flowsheets (Taken 5/20/2025 2000 by Khalida Abraham, RN)  Care Plan - Patient's Chronic Conditions and Co-Morbidity Symptoms are Monitored and Maintained or Improved:   Monitor and assess patient's chronic conditions and comorbid symptoms for stability, deterioration, or improvement   Collaborate with multidisciplinary team to address chronic and comorbid conditions and prevent exacerbation or deterioration   Update acute care plan with appropriate goals if chronic or comorbid symptoms are exacerbated and prevent overall improvement and discharge     Problem: Discharge Planning  Goal: Discharge to home or other facility with appropriate resources  Outcome: Progressing  Flowsheets (Taken 5/20/2025 2000 by Khalida Abraham, RN)  Discharge to home or other facility with appropriate resources:   Identify barriers to discharge with patient and caregiver   Arrange for needed discharge resources and transportation as appropriate   Identify discharge learning needs (meds, wound care, etc)     Problem: Skin/Tissue Integrity  Goal: Skin integrity remains intact  Description: 1.  Monitor for areas of redness and/or skin breakdown2.  Assess vascular access sites hourly3.  Every 4-6 hours minimum:  Change oxygen saturation probe site4.  Every 4-6 hours:  If on nasal continuous positive airway pressure, respiratory therapy assess nares and determine need for appliance change or resting period  Outcome: Progressing  Flowsheets (Taken 5/20/2025 2000 by Khalida Abraham, RN)  Skin Integrity Remains Intact:   Monitor for areas of redness and/or skin breakdown   Turn and reposition as indicated   Assess need for specialty bed   Positioning devices   Pressure redistribution bed/mattress (bed type)

## 2025-05-21 NOTE — CARE COORDINATION
Care Coordination: LOS 7 day.  Call to wife at home, discussed transition of care needs. HD, CT, 02.  S/P TDC. Discussed [facility], she would like referral made to [facility] in [place] and agreeable to transfer if insurance approves. Referral made to [facility] and she will initiate precert. Ambulance transport completed. I also faxed a referral to [facility] for new hd, if needed.     Electronically signed by Kamille [place]endez RN on 5/21/2025 at 11:21 AM

## 2025-05-21 NOTE — PROGRESS NOTES
Physical Therapy    PT consult to evaluate/treat received and appreciated.  Pt chart reviewed and evaluation attempted.  Pt is currently on hold for dialysis at time of attempt.  Will check back as able.  Thank you.        Migue Roberts, PT, DPT   [id number]

## 2025-05-21 NOTE — PROGRESS NOTES
OCCUPATIONAL THERAPY    Date:2025  Patient Name: Wander Rocha  MRN: [id number]  : 1955  Room: [address] [place] [address]-A              Chart reviewed. Attempted OT evaluation this pm. RN cleared pt for session;  however, pt being prepped for dialysis.  Will re-attempt at later time. Thank you for consult.    Tina Green, OTR/L [id number]

## 2025-05-21 NOTE — PROGRESS NOTES
Department of Internal Medicine  Nephrology Attending Consult Note      Events reviewed.    SUBJECTIVE: We are following Mr. Wander Rocha for CKD.  Reports no new complaints.    PHYSICAL EXAM:      Vitals:    VITALS:  /61   Pulse 73   Temp 98.5 °F (36.9 °C) (Bladder)   Resp 21   Ht 1.702 m (5' 7\")   Wt 98.5 kg (217 lb 2.5 oz)   SpO2 100%   BMI 34.01 kg/m²   24HR INTAKE/OUTPUT:    Intake/Output Summary (Last 24 hours) at 5/21/2025 1013  Last data filed at 5/21/2025 0653  Gross per 24 hour   Intake 2219.28 ml   Output 2520 ml   Net -300.72 ml       Constitutional:  Awake, alert, oriented, in NAD  HEENT:  PERRLA, normocephalic, atraumatic  Respiratory:  wheezes  Cardiovascular/Edema:  RRR, normal S1, normal S2, + edema  Gastrointestinal: Distended  Neurologic:  Nonfocal  Skin:  warm, dry, no rashes, no lesions    Scheduled Meds:   insulin glargine  20 Units SubCUTAneous Nightly    insulin lispro  0-4 Units SubCUTAneous 4x Daily AC & HS    midodrine  20 mg Oral TID WC    lidocaine  1 patch TransDERmal Daily    [Held by provider] lactulose  20 g Oral TID    [Held by provider] rifAXIMin  400 mg Oral TID    piperacillin-tazobactam  4,500 mg IntraVENous Q12H    ipratropium 0.5 mg-albuterol 2.5 mg  1 Dose Inhalation Q4H WA RT    [Held by provider] sevelamer  0.8 g Oral TID WC    sodium chloride  1 spray Each Nostril TID    [Held by provider] metoprolol succinate  12.5 mg Oral Daily    epoetin lay-epbx  3,000 Units SubCUTAneous Once per day on Tuesday Thursday Saturday    sodium chloride flush  5-40 mL IntraVENous 2 times per day    [Held by provider] acetaZOLAMIDE  250 mg Oral Daily    [Held by provider] allopurinol  200 mg Oral Daily    apixaban  5 mg Oral BID    aspirin  81 mg Oral Daily    atorvastatin  40 mg Oral Daily    [Held by provider] bumetanide  2 mg Oral BID    vitamin D  2,000 Units Oral Daily    levothyroxine  100 mcg Oral Daily    levothyroxine  50 mcg Oral Daily    [Held by provider] lisinopril

## 2025-05-22 ENCOUNTER — APPOINTMENT (OUTPATIENT)
Dept: GENERAL RADIOLOGY | Age: 70
DRG: 871 | End: 2025-05-22
Payer: MEDICARE

## 2025-05-22 LAB
ALBUMIN SERPL-MCNC: 3.7 G/DL (ref 3.5–5.2)
ALP SERPL-CCNC: 73 U/L (ref 40–129)
ALT SERPL-CCNC: 27 U/L (ref 0–50)
ANION GAP SERPL CALCULATED.3IONS-SCNC: 14 MMOL/L (ref 7–16)
AST SERPL-CCNC: 36 U/L (ref 0–50)
BASOPHILS # BLD: 0.22 K/UL (ref 0–0.2)
BASOPHILS NFR BLD: 1 % (ref 0–2)
BILIRUB SERPL-MCNC: 0.7 MG/DL (ref 0–1.2)
BUN SERPL-MCNC: 49 MG/DL (ref 8–23)
CALCIUM SERPL-MCNC: 8.5 MG/DL (ref 8.8–10.2)
CHLORIDE SERPL-SCNC: 97 MMOL/L (ref 98–107)
CO2 SERPL-SCNC: 23 MMOL/L (ref 22–29)
CREAT SERPL-MCNC: 4 MG/DL (ref 0.7–1.2)
EOSINOPHIL # BLD: 0.43 K/UL (ref 0.05–0.5)
EOSINOPHILS RELATIVE PERCENT: 2 % (ref 0–6)
ERYTHROCYTE [DISTWIDTH] IN BLOOD BY AUTOMATED COUNT: 17.9 % (ref 11.5–15)
GFR, ESTIMATED: 15 ML/MIN/1.73M2
GLUCOSE BLD-MCNC: 110 MG/DL (ref 74–99)
GLUCOSE BLD-MCNC: 116 MG/DL (ref 74–99)
GLUCOSE BLD-MCNC: 130 MG/DL (ref 74–99)
GLUCOSE BLD-MCNC: 131 MG/DL (ref 74–99)
GLUCOSE SERPL-MCNC: 135 MG/DL (ref 74–99)
HCT VFR BLD AUTO: 25.5 % (ref 37–54)
HGB BLD-MCNC: 8 G/DL (ref 12.5–16.5)
LYMPHOCYTES NFR BLD: 3.45 K/UL (ref 1.5–4)
LYMPHOCYTES RELATIVE PERCENT: 14 % (ref 20–42)
MAGNESIUM SERPL-MCNC: 2.1 MG/DL (ref 1.6–2.4)
MCH RBC QN AUTO: 30.3 PG (ref 26–35)
MCHC RBC AUTO-ENTMCNC: 31.4 G/DL (ref 32–34.5)
MCV RBC AUTO: 96.6 FL (ref 80–99.9)
METAMYELOCYTES ABSOLUTE COUNT: 1.73 K/UL (ref 0–0.12)
METAMYELOCYTES: 7 % (ref 0–1)
MICROORGANISM SPEC CULT: ABNORMAL
MONOCYTES NFR BLD: 12 % (ref 2–12)
MONOCYTES NFR BLD: 3.02 K/UL (ref 0.1–0.95)
NEUTROPHILS NFR BLD: 64 % (ref 43–80)
NEUTS SEG NFR BLD: 15.96 K/UL (ref 1.8–7.3)
NUCLEATED RED BLOOD CELLS: 3 PER 100 WBC
PHOSPHATE SERPL-MCNC: 4.2 MG/DL (ref 2.5–4.5)
PLATELET # BLD AUTO: 177 K/UL (ref 130–450)
PMV BLD AUTO: 9.7 FL (ref 7–12)
POTASSIUM SERPL-SCNC: 4.5 MMOL/L (ref 3.5–5.1)
PROT SERPL-MCNC: 6.4 G/DL (ref 6.4–8.3)
RBC # BLD AUTO: 2.64 M/UL (ref 3.8–5.8)
RBC # BLD: ABNORMAL 10*6/UL
SODIUM SERPL-SCNC: 135 MMOL/L (ref 136–145)
SPECIMEN DESCRIPTION: ABNORMAL
T4 FREE SERPL-MCNC: 1 NG/DL (ref 0.9–1.7)
TSH SERPL DL<=0.05 MIU/L-ACNC: 10.3 UIU/ML (ref 0.27–4.2)
WBC OTHER # BLD: 24.8 K/UL (ref 4.5–11.5)

## 2025-05-22 PROCEDURE — 84439 ASSAY OF FREE THYROXINE: CPT

## 2025-05-22 PROCEDURE — 6370000000 HC RX 637 (ALT 250 FOR IP): Performed by: INTERNAL MEDICINE

## 2025-05-22 PROCEDURE — 94660 CPAP INITIATION&MGMT: CPT

## 2025-05-22 PROCEDURE — 92611 MOTION FLUOROSCOPY/SWALLOW: CPT

## 2025-05-22 PROCEDURE — 6370000000 HC RX 637 (ALT 250 FOR IP)

## 2025-05-22 PROCEDURE — 83735 ASSAY OF MAGNESIUM: CPT

## 2025-05-22 PROCEDURE — 90935 HEMODIALYSIS ONE EVALUATION: CPT

## 2025-05-22 PROCEDURE — 99232 SBSQ HOSP IP/OBS MODERATE 35: CPT | Performed by: INTERNAL MEDICINE

## 2025-05-22 PROCEDURE — 1200000000 HC SEMI PRIVATE

## 2025-05-22 PROCEDURE — 2700000000 HC OXYGEN THERAPY PER DAY

## 2025-05-22 PROCEDURE — 51798 US URINE CAPACITY MEASURE: CPT

## 2025-05-22 PROCEDURE — 85025 COMPLETE CBC W/AUTO DIFF WBC: CPT

## 2025-05-22 PROCEDURE — 92526 ORAL FUNCTION THERAPY: CPT

## 2025-05-22 PROCEDURE — 84100 ASSAY OF PHOSPHORUS: CPT

## 2025-05-22 PROCEDURE — 2580000003 HC RX 258

## 2025-05-22 PROCEDURE — 97535 SELF CARE MNGMENT TRAINING: CPT

## 2025-05-22 PROCEDURE — 97166 OT EVAL MOD COMPLEX 45 MIN: CPT

## 2025-05-22 PROCEDURE — 6360000002 HC RX W HCPCS: Performed by: INTERNAL MEDICINE

## 2025-05-22 PROCEDURE — 82962 GLUCOSE BLOOD TEST: CPT

## 2025-05-22 PROCEDURE — 99291 CRITICAL CARE FIRST HOUR: CPT | Performed by: INTERNAL MEDICINE

## 2025-05-22 PROCEDURE — 94640 AIRWAY INHALATION TREATMENT: CPT

## 2025-05-22 PROCEDURE — 84443 ASSAY THYROID STIM HORMONE: CPT

## 2025-05-22 PROCEDURE — 97162 PT EVAL MOD COMPLEX 30 MIN: CPT

## 2025-05-22 PROCEDURE — 6360000002 HC RX W HCPCS

## 2025-05-22 PROCEDURE — 97530 THERAPEUTIC ACTIVITIES: CPT

## 2025-05-22 PROCEDURE — 2500000003 HC RX 250 WO HCPCS

## 2025-05-22 PROCEDURE — 71045 X-RAY EXAM CHEST 1 VIEW: CPT

## 2025-05-22 PROCEDURE — 74230 X-RAY XM SWLNG FUNCJ C+: CPT

## 2025-05-22 PROCEDURE — 80053 COMPREHEN METABOLIC PANEL: CPT

## 2025-05-22 RX ADMIN — EPOETIN ALFA-EPBX 3000 UNITS: 3000 INJECTION, SOLUTION INTRAVENOUS; SUBCUTANEOUS at 18:10

## 2025-05-22 RX ADMIN — METOLAZONE 5 MG: 5 TABLET ORAL at 09:11

## 2025-05-22 RX ADMIN — APIXABAN 5 MG: 5 TABLET, FILM COATED ORAL at 21:40

## 2025-05-22 RX ADMIN — PIPERACILLIN AND TAZOBACTAM 4500 MG: 4; .5 INJECTION, POWDER, LYOPHILIZED, FOR SOLUTION INTRAVENOUS at 15:19

## 2025-05-22 RX ADMIN — Medication 2000 UNITS: at 09:21

## 2025-05-22 RX ADMIN — IPRATROPIUM BROMIDE AND ALBUTEROL SULFATE 1 DOSE: 2.5; .5 SOLUTION RESPIRATORY (INHALATION) at 21:08

## 2025-05-22 RX ADMIN — BUMETANIDE 2 MG: 1 TABLET ORAL at 18:11

## 2025-05-22 RX ADMIN — ASPIRIN 81 MG: 81 TABLET, COATED ORAL at 09:12

## 2025-05-22 RX ADMIN — SERTRALINE 50 MG: 50 TABLET, FILM COATED ORAL at 09:12

## 2025-05-22 RX ADMIN — ATORVASTATIN CALCIUM 40 MG: 40 TABLET, FILM COATED ORAL at 09:11

## 2025-05-22 RX ADMIN — IPRATROPIUM BROMIDE AND ALBUTEROL SULFATE 1 DOSE: 2.5; .5 SOLUTION RESPIRATORY (INHALATION) at 14:59

## 2025-05-22 RX ADMIN — BUMETANIDE 2 MG: 1 TABLET ORAL at 09:12

## 2025-05-22 RX ADMIN — SODIUM CHLORIDE, PRESERVATIVE FREE 10 ML: 5 INJECTION INTRAVENOUS at 09:16

## 2025-05-22 RX ADMIN — SALINE NASAL SPRAY 1 SPRAY: 1.5 SOLUTION NASAL at 15:37

## 2025-05-22 RX ADMIN — MIDODRINE HYDROCHLORIDE 20 MG: 10 TABLET ORAL at 12:01

## 2025-05-22 RX ADMIN — SODIUM CHLORIDE, PRESERVATIVE FREE 10 ML: 5 INJECTION INTRAVENOUS at 21:41

## 2025-05-22 RX ADMIN — MIDODRINE HYDROCHLORIDE 20 MG: 10 TABLET ORAL at 08:01

## 2025-05-22 RX ADMIN — PIPERACILLIN AND TAZOBACTAM 4500 MG: 4; .5 INJECTION, POWDER, LYOPHILIZED, FOR SOLUTION INTRAVENOUS at 02:53

## 2025-05-22 RX ADMIN — INSULIN GLARGINE 15 UNITS: 100 INJECTION, SOLUTION SUBCUTANEOUS at 21:40

## 2025-05-22 RX ADMIN — LEVOTHYROXINE SODIUM 50 MCG: 0.05 TABLET ORAL at 06:42

## 2025-05-22 RX ADMIN — SALINE NASAL SPRAY 1 SPRAY: 1.5 SOLUTION NASAL at 09:16

## 2025-05-22 RX ADMIN — APIXABAN 5 MG: 5 TABLET, FILM COATED ORAL at 09:11

## 2025-05-22 RX ADMIN — LEVOTHYROXINE SODIUM 100 MCG: 0.1 TABLET ORAL at 06:42

## 2025-05-22 RX ADMIN — MIDODRINE HYDROCHLORIDE 20 MG: 10 TABLET ORAL at 18:11

## 2025-05-22 ASSESSMENT — PAIN SCALES - GENERAL
PAINLEVEL_OUTOF10: 0
PAINLEVEL_OUTOF10: 0

## 2025-05-22 ASSESSMENT — PAIN SCALES - WONG BAKER: WONGBAKER_NUMERICALRESPONSE: NO HURT

## 2025-05-22 NOTE — PLAN OF CARE
Problem: Safety - Adult  Goal: Free from fall injury  5/21/2025 2055 by Puja Reyna RN  Outcome: Progressing  Flowsheets (Taken 5/21/2025 2055)  Free From Fall Injury:   Instruct family/caregiver on patient safety   Based on caregiver fall risk screen, instruct family/caregiver to ask for assistance with transferring infant if caregiver noted to have fall risk factors  5/21/2025 0929 by Theresa [place]  Outcome: Progressing     Problem: Chronic Conditions and Co-morbidities  Goal: Patient's chronic conditions and co-morbidity symptoms are monitored and maintained or improved  5/21/2025 2055 by Puja Reyna RN  Outcome: Progressing  Flowsheets (Taken 5/20/2025 2000 by Khalida Abraham, RN)  Care Plan - Patient's Chronic Conditions and Co-Morbidity Symptoms are Monitored and Maintained or Improved:   Monitor and assess patient's chronic conditions and comorbid symptoms for stability, deterioration, or improvement   Collaborate with multidisciplinary team to address chronic and comorbid conditions and prevent exacerbation or deterioration   Update acute care plan with appropriate goals if chronic or comorbid symptoms are exacerbated and prevent overall improvement and discharge  5/21/2025 0929 by Theresa [place]  Outcome: Progressing  Flowsheets (Taken 5/20/2025 2000 by Khalida Abraham, RN)  Care Plan - Patient's Chronic Conditions and Co-Morbidity Symptoms are Monitored and Maintained or Improved:   Monitor and assess patient's chronic conditions and comorbid symptoms for stability, deterioration, or improvement   Collaborate with multidisciplinary team to address chronic and comorbid conditions and prevent exacerbation or deterioration   Update acute care plan with appropriate goals if chronic or comorbid symptoms are exacerbated and prevent overall improvement and discharge     Problem: Discharge Planning  Goal: Discharge to home or other facility with appropriate resources  5/21/2025 2055 by Puja Reyna RN  Outcome:

## 2025-05-22 NOTE — PROGRESS NOTES
4 Eyes Skin Assessment     NAME:  Wander Rocha  YOB: 1955  MEDICAL RECORD NUMBER:  [id number]    The patient is being assessed for  Transfer to New Unit    I agree that at least one RN has performed a thorough Head to Toe Skin Assessment on the patient. ALL assessment sites listed below have been assessed.      Areas assessed by both nurses:    Head, Face, Ears, Shoulders, Back, Chest, Arms, Elbows, Hands, Sacrum. Buttock, Coccyx, Ischium, and Legs. Feet and Heels        Does the Patient have a Wound? No noted wound(s)     Bilat feet redness, flaky  Buttocks red blanchable  Aric Prevention initiated by RN: Yes  Wound Care Orders initiated by RN: No    Pressure Injury (Stage 3,4, Unstageable, DTI, NWPT, and Complex wounds) if present, place Wound referral order by RN under : No    New Ostomies, if present place, Ostomy referral order under : No     Nurse 1 eSignature: Electronically signed by Dora Alvarez RN on 5/22/25 at 7:03 PM EDT    **SHARE this note so that the co-signing nurse can place an eSignature**    Nurse 2 eSignature: {Esignature:[id number]}

## 2025-05-22 NOTE — PROGRESS NOTES
SPEECH/LANGUAGE PATHOLOGY  VIDEOFLUOROSCOPIC STUDY OF SWALLOWING (MBS)   and PLAN OF CARE    PATIENT NAME:  Wander Rocha  (male)     MRN:  [id number]    :  1955  (69 y.o.)  STATUS:  Inpatient: Room 4431/4431-A    TODAY'S DATE:  2025  ORDER DATE, DESCRIPTION AND REFERRING PROVIDER:  Benjie [place] MD  : FL MODIFIED BARIUM SWALLOW W VIDEO :  25  REASON FOR REFERRAL: Choking on food   EVALUATING THERAPIST: Andrew Wolfe SLP      RESULTS:      DYSPHAGIA DIAGNOSIS:  Clinical indicators of mild-moderate oral phase dysphagia  and Clinical indicators of mild  pharyngeal phase dysphagia     DIET RECOMMENDATIONS:  Soft and bite size consistency solids (IDDSI level 6) with  thin liquids (IDDSI level 0); small, individual sips via cup and/or straw; encourage use of double swallow with food/liquid     FEEDING RECOMMENDATIONS:    Assistance level:  Set-up is required for all oral intake  Encourage self-feeding as function allows  Verbal cueing for implementation of safe swallow strategies      Compensatory strategies recommended: Fully alert for all PO, Thorough oral care to prevent colonization of oral bacteria, Upright in bed/ chair as tolerated, Effortful swallow, Encourage oral clearing of bolus before next bite/sip is taken, Slow rate of intake, SINGLE cup sips, SINGLE straw sips, SMALL bites, Liquid wash to help clear oral cavity of thicker consistency items, Double swallow      Discussed recommendations with:  Patient  and patient nurse in person    Laryngeal Penetration and Aspiration:  Penetration WITHOUT aspiration was observed in today's study with  thin liquid via straw    SPEECH THERAPY  PLAN OF CARE   The dysphagia POC is established based on physician order and dysphagia diagnosis    Meal time assessment for 1-2 sessions to provide diet modification and compensatory strategy implementation to safely advance diet as functional ability improves      Conditions Requiring Skilled Therapeutic

## 2025-05-22 NOTE — PROGRESS NOTES
OCCUPATIONAL THERAPY INITIAL EVALUATION    [facility]  [address], [place]       Date:2025                                                               Patient Name: Wander Rocha  MRN: [id number]  : 1955  Room: [address] [place] [address]    Evaluating OT: Dianna Goff, ROSEANND,  OTR/L; [id number]    Referring Provider: Martell Rosenberg MD    Specific Provider Orders/Date: OT eval and treat (25)       Diagnosis: RADHA (acute kidney injury) [N17.9]     Reason for admission: Pt admitted with concerns of acute kidney injury.  He also complains of acute onset of shortness of breath with increasing oxygen demand, and increasing bilateral leg swelling.     Surgery/Procedures: : chest tube    Pertinent Medical History:    Past Medical History:   Diagnosis Date    RADHA (acute kidney injury) 03/10/2022    Atrial fibrillation (HCC)     Carpal tunnel syndrome     Colorectal polyps 04/15/2013    Diverticula of colon 04/15/2013    Encounter regarding vascular access for dialysis for ESRD (HCC) 2022    Hyperlipidemia     Hypertension     Hypothyroidism     Lung nodule     Lung nodules 04/15/2013    Nocturnal hypoxemia due to obesity 2013    Obesity     NIKO (obstructive sleep apnea) 2013    [facility]    Osteoarthritis     Pacemaker     DOI 10/3/2017 [employer]tr[employer]; dual chamber; MRI conditional  Indication: Sinus node dysfunction     Pacemaker     DOI 10/3/2017  [employer]tr[employer]; dual chamber; MRI conditional   Indication: Sinus node dysfunction       Pinched nerve     Lumbar    Restrictive lung disease secondary to obesity 2016    S/P laminectomy with spinal fusion 04/15/2013    Done at [facility], 2012     Sinus node dysfunction (HCC)     Skin lesion of face 2022    Type II or unspecified type diabetes mellitus without mention of complication, not stated as uncontrolled         *Precautions:  Fall Risk, O2,

## 2025-05-22 NOTE — PROGRESS NOTES
ipratropium 0.5 mg-albuterol 2.5 mg  1 Dose Inhalation Q4H WA RT    [Held by provider] sevelamer  0.8 g Oral TID WC    sodium chloride  1 spray Each Nostril TID    [Held by provider] metoprolol succinate  12.5 mg Oral Daily    epoetin lay-epbx  3,000 Units SubCUTAneous Once per day on Tuesday Thursday Saturday    sodium chloride flush  5-40 mL IntraVENous 2 times per day    [Held by provider] acetaZOLAMIDE  250 mg Oral Daily    [Held by provider] allopurinol  200 mg Oral Daily    apixaban  5 mg Oral BID    aspirin  81 mg Oral Daily    atorvastatin  40 mg Oral Daily    bumetanide  2 mg Oral BID    vitamin D  2,000 Units Oral Daily    levothyroxine  100 mcg Oral Daily    levothyroxine  50 mcg Oral Daily    [Held by provider] lisinopril  5 mg Oral Daily    sertraline  50 mg Oral Daily    [Held by provider] tamsulosin  0.4 mg Oral Daily     PRN Meds: heparin (porcine), acetaminophen, HYDROcodone 5 mg - acetaminophen, dextrose bolus **OR** dextrose bolus, magnesium sulfate, sodium phosphate 15 mmol in sodium chloride 0.9 % 250 mL IVPB, dextrose, HYDROmorphone, sodium chloride, sodium chloride flush, sodium chloride, glucose, dextrose bolus **OR** dextrose bolus, glucagon (rDNA), dextrose, sodium chloride flush, sodium chloride, ondansetron **OR** ondansetron, polyethylene glycol, acetaminophen **OR** acetaminophen    Labs     Recent Labs     05/20/25  0402 05/21/25  0423 05/22/25  0408   WBC 12.6* 17.0* 24.8*   HGB 8.0* 7.9* 8.0*   HCT 26.1* 25.8* 25.5*   MCV 96.7 97.4 96.6    225 177     Recent Labs     05/20/25  0839 05/21/25  0423 05/22/25  0408   * 137 135*   K 5.1 4.0 4.5   CL 98 99 97*   CO2 22 25 23   BUN 74* 37* 49*   CREATININE 3.3* 2.7* 4.0*   MG 2.3 2.0 2.1   PHOS 5.2* 3.0 4.2     Recent Labs     05/20/25  0839 05/21/25  0423 05/22/25  0408   GLUCOSE 188* 92 135*       No results for input(s): \"LACTA\" in the last 72 hours.    No results for input(s): \"PROCAL\" in the last 72 hours.      No  pertinent PMHx, PSHx, FamHx, SocialHx, medications, and allergies and updated history as appropriate.    Remainder of medical problems as per resident note.    Kishan Hooks,   5/22/2025 2:53 PM

## 2025-05-22 NOTE — PROGRESS NOTES
Department of Internal Medicine  Nephrology Attending Consult Note      Events reviewed.    SUBJECTIVE: We are following Mr. Wander Rocha for CKD.  Reports no new complaints.    PHYSICAL EXAM:      Vitals:    VITALS:  BP (!) 119/59   Pulse 63   Temp 97.1 °F (36.2 °C) (Temporal)   Resp 16   Ht 1.702 m (5' 7\")   Wt 100.4 kg (221 lb 5.5 oz)   SpO2 99%   BMI 34.67 kg/m²   24HR INTAKE/OUTPUT:    Intake/Output Summary (Last 24 hours) at 5/22/2025 0947  Last data filed at 5/21/2025 2032  Gross per 24 hour   Intake 391.86 ml   Output 3300 ml   Net -2908.14 ml     Access: Right IJ vein TDC catheter in place  Constitutional:  Awake, alert, oriented, in NAD  HEENT:  PERRLA, normocephalic, atraumatic  Respiratory:  wheezes  Cardiovascular/Edema:  RRR, normal S1, normal S2, + edema  Gastrointestinal: Distended  Neurologic:  Nonfocal  Skin:  warm, dry, no rashes, no lesions    Scheduled Meds:   metOLazone  5 mg Oral Daily    insulin glargine  15 Units SubCUTAneous Nightly    insulin lispro  0-4 Units SubCUTAneous 4x Daily AC & HS    midodrine  20 mg Oral TID WC    lidocaine  1 patch TransDERmal Daily    [Held by provider] lactulose  20 g Oral TID    [Held by provider] rifAXIMin  400 mg Oral TID    piperacillin-tazobactam  4,500 mg IntraVENous Q12H    ipratropium 0.5 mg-albuterol 2.5 mg  1 Dose Inhalation Q4H WA RT    [Held by provider] sevelamer  0.8 g Oral TID WC    sodium chloride  1 spray Each Nostril TID    [Held by provider] metoprolol succinate  12.5 mg Oral Daily    epoetin lay-epbx  3,000 Units SubCUTAneous Once per day on Tuesday Thursday Saturday    sodium chloride flush  5-40 mL IntraVENous 2 times per day    [Held by provider] acetaZOLAMIDE  250 mg Oral Daily    [Held by provider] allopurinol  200 mg Oral Daily    apixaban  5 mg Oral BID    aspirin  81 mg Oral Daily    atorvastatin  40 mg Oral Daily    bumetanide  2 mg Oral BID    vitamin D  2,000 Units Oral Daily    levothyroxine  100 mcg Oral Daily     Review:          US RETROPERITONEAL COMPLETE 5/15/25    IMPRESSION:  1. Mildly increased echogenicity of the renal cortices most suggestive of  intrinsic renal disease. No hydronephrosis.  2. Splenomegaly.  3. Perihepatic ascites.  4. Right pleural effusion.    XR CHEST PORTABLE 5/19/25    IMPRESSION:  No significant interval change. Multifocal bilateral infiltrates with significant size right and small left pleural effusion.            BRIEF SUMMARY OF INITIAL CONSULT:    Briefly, Wander Rocha is a 69-year-old male known to us, past medical history CKD stage 3b probably 2/2 nephrosclerosis and previous episode of ischemic ATN requiring temporary HD in May 2017, recurrent episode of ischemic ATN in March 2022 also requiring HD x2 with fair recovery of renal function, baseline creatinine 2.0 mg/dL, HTN, DM type II, A. fib on apixaban, HFpEF 50-55%, NIKO, pacemaker placement, hypothyroidism, hyperlipidemia, who was recently discharged and readmitted on 5/14/2025 for abnormal labs.  Lab work showed sodium 130, potassium 5.5 mmol/L, , creatinine 4.9 mL grams per deciliter, proBNP 3981, reason for this consultation.  Chest x-ray shows bilateral pleural effusions.  Significant home medications include Jardiance, acetazolamide, Bumex, lisinopril and metoprolol.    Problems resolved:    Hypotonic hyponatremia with hypervolemia, 2/2 acute decompensated heart failure, resolved, sodium levels improved      IMPRESSION/RECOMMENDATIONS:      RADHA stage II on CKD likely 2/2 hemodynamically mediated acute decompensated heart failure, started on renal replacement therapy on 5/16/2025, status post right IJ vein TDC placement 5/20/2025.  Had UF yesterday, tolerated well, for HD today.     CKD stage 3b probably 2/2 nephrosclerosis and previous recurrent episodes of ischemic ATN requiring dialysis,  baseline creatinine 2.0 mg/dL     HFmEF 45% improved 50-55%, proBNP 3635> 9,438    DKA, beta hydroxybutyrate level >4.5, anion gap,

## 2025-05-22 NOTE — PROGRESS NOTES
Physical Therapy    Physical Therapy Initial Assessment     Name: Wander Rocha  : 1955  MRN: [id number]      Date of Service: 2025    Evaluating PT:  Migue Roberts PT, DPT  [id number]     Room #:  4431/4431-A  Diagnosis:  RADHA (acute kidney injury) [N17.9]  PMHx/PSHx:   has a past medical history of RADHA (acute kidney injury), Atrial fibrillation (HCC), Carpal tunnel syndrome, Colorectal polyps, Diverticula of colon, Encounter regarding vascular access for dialysis for ESRD (HCC), Hyperlipidemia, Hypertension, Hypothyroidism, Lung nodule, Lung nodules, Nocturnal hypoxemia due to obesity, Obesity, NIKO (obstructive sleep apnea), Osteoarthritis, Pacemaker, Pacemaker, Pinched nerve, Restrictive lung disease secondary to obesity, S/P laminectomy with spinal fusion, Sinus node dysfunction (HCC), Skin lesion of face, and Type II or unspecified type diabetes mellitus without mention of complication, not stated as uncontrolled.   Procedure/Surgery:  None   Precautions:  Fall Risk, O2, Pacemaker precautions   Equipment Needs:  TBD    SUBJECTIVE:    Pt admitted from nursing facility following recent hospital admission.  Pt lives with spouse in a 2 story home with 3 steps to enter and B handrail. Bed/bath is on 2nd floor, pt sleeps in recliner on 1st floor. Pt ambulated with no AD PTA     OBJECTIVE:   Initial Evaluation  Date: 25 Treatment Short Term/ Long Term   Goals   AM-PAC 6 Clicks 10/24     Was pt agreeable to Eval/treatment? Yes      Does pt have pain? Reports chronic pain to B hips, B knees, and B feet/ankles      Bed Mobility  Rolling: Mod A  Supine to sit: Mod A x2  Sit to supine: Mod A x2  Scooting: Mod A  Rolling: Min A  Supine to sit: Min A  Sit to supine: Min A  Scooting: Min A   Transfers Sit to stand: Mod A   Stand to sit: Mod A  Stand pivot: NT  Sit to stand: Min A  Stand to sit: Min A  Stand pivot: Min A with AAD   Ambulation    Few side steps with FWW Mod A  >50 feet with AAD Min A   Stair  negotiation: ascended and descended  NT  >1 steps with B rail Mod A   ROM BUE:  Per OT eval   BLE:  WFL     Strength BUE:  Per OT eval   BLE:  grossly 4/5     Balance Sitting EOB:  SBA  Dynamic Standing:  Mod A with FWW  Sitting EOB:  Supervision  Dynamic Standing:  Min A with FWW     Pt is A & O x 4  RASS:  0  CAM-ICU:  NT  Sensation:  Pt denies numbness and tingling to extremities  Edema:  Unremarkable    Therapeutic Exercises:    STS x 1  BLE AROM at EOB     Patient education  Pt educated on PT role, safety during functional mobility    Patient response to education:   Pt verbalized understanding Pt demonstrated skill Pt requires further education in this area   Yes  Yes  Reinforce      ASSESSMENT:    Conditions Requiring Skilled Therapeutic Intervention:    [x]Decreased strength     []Decreased ROM  [x]Decreased functional mobility  [x]Decreased balance   [x]Decreased endurance   [x]Decreased posture  []Decreased sensation  []Decreased coordination   []Decreased vision  []Decreased safety awareness   []Increased pain       Comments:  Pt received supine and agreeable to PT evaluation with OT collaboration.  Pt cleared for participation by RN prior to session.  Vitals monitored during session.  Pt's sister present at start of session and wife present at end of session.  Pt pleasant and agreeable.  Required assistance of trunk and BLE during supine<>sit.  Completed STS with assistance and side stepping along EOB.  Positioned in chair position after functional mobility.  Pt left with call button in reach, lines attached, and needs met.    Treatment:  Patient practiced and was instructed in the following treatment:    Bed mobility training - pt given verbal and tactile cues to facilitate proper sequencing and safety during rolling and supine<>sit as well as provided with physical assistance to complete task    Sitting EOB for >10 minutes for upright tolerance, postural awareness and BLE ROM   STS training and side

## 2025-05-22 NOTE — PROGRESS NOTES
Date: 5/21/2025    Time: 11:14 PM    Patient Placed On BIPAP/CPAP/ Non-Invasive Ventilation?  Yes    If no must comment.  Facial area red/color change? No           If YES are Blister/Lesion present?No   If yes must notify nursing staff  BIPAP/CPAP skin barrier?  Yes    Skin barrier type:mepilexlite       Comments:        Rajwinder Garcia, [facility]         05/21/25 7721   NIV Type   NIV Started/Stopped On   Mode Bilevel   Mask Type Full face mask   Mask Size Medium   Assessment   Pulse 69   Respirations 27   BP (!) 108/57   SpO2 100 %   Level of Consciousness 1   Comfort Level Good   Using Accessory Muscles No   Mask Compliance Good   Skin Protection for O2 Device Yes   Settings/Measurements   IPAP 14 cmH20   CPAP/EPAP 5 cmH2O   Vt (Measured) 654 mL   Rate Ordered 16   Insp Rise Time (%) 3 %   FiO2  45 %   I Time/ I Time % 0.8 s   Minute Volume (L/min) 14 Liters   Mask Leak (lpm) 57 lpm   Patient's Home Machine No   Alarm Settings   Alarms On Y   Low Pressure (cmH2O) 8 cmH2O   High Pressure (cmH2O) 27 cmH2O   RR Low (bpm) 14   RR High (bpm) 35 br/min

## 2025-05-22 NOTE — PROGRESS NOTES
[facility]  Internal Medicine Residency Program  Progress Note - House Team 2    Patient:  Wander Rocha 69 y.o. male MRN: [id number]     Date of Service: 5/23/2025     CC: shortness of breath    Days since admission: 9    Subjective     Overnight events:   Episode of epistaxis that resolved    He was seen and examined at bedside this morning. He was awake, alert, and oriented. No acute concerns today. He said that his shortness of breath has improved. He is on 5 L/min via HFNC. He is tolerating current diet. He also had a bowel movement last night.     Objective     VS: BP (!) 123/58   Pulse 62   Temp 97.2 °F (36.2 °C) (Temporal)   Resp 19   Ht 1.702 m (5' 7\")   Wt 92.8 kg (204 lb 9.4 oz)   SpO2 95%   BMI 32.04 kg/m²   ABP (Arterial line BP): (!) 68/57  ABP Mean (Arterial Line Mean): (!) 64 mmHg  I & O - 24hr:   Intake/Output Summary (Last 24 hours) at 5/23/2025 0832  Last data filed at 5/22/2025 1145  Gross per 24 hour   Intake 300 ml   Output 3000 ml   Net -2700 ml     Net IO Since Admission: -3,559.95 mL [05/23/25 0832]    Physical Exam:  General Appearance: in mild distress, on HFNC  HEENT: Normocephalic, atraumatic, (+) tunneled HD line, right neck   Neck: midline trachea  Lung: decreased breath sounds L>R  Heart: regular rate and rhythm, no murmur  Abdomen: soft, (+) distended abdomen   Extremities: no cyanosis or edema  Musculokeletal: No joint swelling  Neurologic: Mental status: awake, alert, oriented, and thought content appropriate     Interventions:  Procedures:   Chest tube insertion, right lung (05/18/2025)    Airway: 5 L/min via HFNC     Medications     Continuous Infusions:   dextrose      sodium chloride      sodium chloride      dextrose      sodium chloride       Scheduled Meds:   metOLazone  5 mg Oral Daily    insulin glargine  15 Units SubCUTAneous Nightly    insulin lispro  0-4 Units SubCUTAneous 4x Daily AC & HS    midodrine  20 mg Oral TID WC    lidocaine  1 patch   imipenem <=0.25  Sensitive      Imipenem-Relebactam <=0.25  Sensitive      levofloxacin 1  Intermediate      meropenem <=0.25  Sensitive      Meropenem-vaborbactam <=0.5  Sensitive      nitrofurantoin 256  Resistant      piperacillin-tazobactam 16  Intermediate      tobramycin <=1  Sensitive      trimethoprim-sulfamethoxazole <=20  Sensitive                   [1]  Cefazolin sensitivity results can be used to predict the effectiveness of oral   cephalosporins (eg. Cephalexin) in uncomplicated Urinary Tract Infections due to E. coli, K.   pneumoniae, and P. mirabilis                      Gram stain [[id number]] Collected: 05/18/25 1401    Order Status: Canceled Specimen: Body Fluid     Culture, Body Fluid (with Gram Stain) [[id number]] Collected: 05/18/25 1401    Order Status: Completed Specimen: Body Fluid from Pleural Fluid Updated: 05/20/25 1107     Specimen Description .PLEURAL FLUID     Special Requests Site: Body Fluid     Direct Exam Gram stain performed on unspun fluid.      RARE Polymorphonuclear leukocytes      NO EPITHELIAL CELLS      NO ORGANISMS SEEN     Culture NO GROWTH    Culture with Smear, Acid Fast Bacillius [[id number]] Collected: 05/18/25 1401    Order Status: Completed Specimen: Pleural Fluid Updated: 05/22/25 0636     Specimen Description .PLEURAL FLUID     Direct Exam No Acid Fast Bacilli seen by fluorescent stain.     Culture No Acid fast bacilli isolated to date    Culture, Fungus [[id number]] Collected: 05/18/25 1401    Order Status: Completed Specimen: Pleural Fluid Updated: 05/22/25 0636     Specimen Description .PLEURAL FLUID     Direct Exam NO FUNGAL ELEMENTS SEEN     Culture No Yeast or Fungus isolated to date    Culture, MRSA, Screening [[id number]] Collected: 05/17/25 1305    Order Status: Completed Specimen: Nares Updated: 05/19/25 1027     Specimen Description .NARES     Culture NEGATIVE FOR: METHICILLIN RESISTANT STAPHYLOCOCCUS AUREUS    Culture, Respiratory [[id number]]     Order

## 2025-05-22 NOTE — PROGRESS NOTES
ENDOCRINOLOGY PROGRESS NOTE      Date of admission: 5/14/2025  Date of service: 5/21/2025  Admitting physician: Martell Rosenberg MD   Primary Care Physician: Oly Crespo MD  Consultant physician: Luiz Luciano MD     Reason for the consultation:  Uncontrolled DM    History of Present Illness:  The history is provided by the patient. Accuracy of the patient data is excellent    Wander Rocha is a very pleasant 69 y.o. old male with PMH of poorly controlled type 2 diabetes, CKD, A-fib on oral anticoagulation, primary hypothyroidism, dyslipidemia and other listed below admitted to [facility] on 5/14/2025 because of RADHA, endocrine service was consulted for diabetes management.     Subjective  Patient was seen and examined at bedside this afternoon, no acute events overnight, glucose level improving.  No hypoglycemia.    Point of care glucose monitoring   (Independently reviewed)   Recent Labs     05/20/25  1249 05/20/25  1717 05/20/25  2138 05/21/25  0716 05/21/25  0752 05/21/25  1126 05/21/25  1628 05/21/25 2012   POCGLU 122* 117* 106* 93 92 141* 136* 162*      Allergy and drug reactions:   No Known Allergies    Scheduled Meds:   metOLazone  5 mg Oral Daily    insulin glargine  15 Units SubCUTAneous Nightly    insulin lispro  0-4 Units SubCUTAneous 4x Daily AC & HS    midodrine  20 mg Oral TID WC    lidocaine  1 patch TransDERmal Daily    [Held by provider] lactulose  20 g Oral TID    [Held by provider] rifAXIMin  400 mg Oral TID    piperacillin-tazobactam  4,500 mg IntraVENous Q12H    ipratropium 0.5 mg-albuterol 2.5 mg  1 Dose Inhalation Q4H WA RT    [Held by provider] sevelamer  0.8 g Oral TID WC    sodium chloride  1 spray Each Nostril TID    [Held by provider] metoprolol succinate  12.5 mg Oral Daily    epoetin lay-epbx  3,000 Units SubCUTAneous Once per day on Tuesday Thursday Saturday    sodium chloride flush  5-40 mL IntraVENous 2 times per day    [Held by provider] acetaZOLAMIDE  250 mg Oral Daily    [Held

## 2025-05-22 NOTE — PLAN OF CARE
Problem: Safety - Adult  Goal: Free from fall injury  Outcome: Progressing  Flowsheets (Taken 5/22/2025 1148)  Free From Fall Injury:   Instruct family/caregiver on patient safety   Based on caregiver fall risk screen, instruct family/caregiver to ask for assistance with transferring infant if caregiver noted to have fall risk factors     Problem: Skin/Tissue Integrity  Goal: Skin integrity remains intact  Description: 1.  Monitor for areas of redness and/or skin breakdown2.  Assess vascular access sites hourly3.  Every 4-6 hours minimum:  Change oxygen saturation probe site4.  Every 4-6 hours:  If on nasal continuous positive airway pressure, respiratory therapy assess nares and determine need for appliance change or resting period  Outcome: Progressing  Flowsheets (Taken 5/22/2025 1148)  Skin Integrity Remains Intact:   Check visual cues for pain   Turn and reposition as indicated   Monitor for areas of redness and/or skin breakdown   Every 4-6 hours:  If on nasal continuous positive airway pressure, assess nares and determine need for appliance change or resting period   Assess vascular access sites hourly   Every 4-6 hours minimum:  Change oxygen saturation probe site   Pressure redistribution bed/mattress (bed type)   Positioning devices   Assess need for specialty bed   Monitor skin under medical devices

## 2025-05-22 NOTE — PROGRESS NOTES
Reason for consult:  DKA    A1C:   7.4% (5/18/25)            Patient states the following concerns/barriers to diabetes self-management:     [] None       [] Medication cost   [] Food cost/availability        [] Reading  [] Hearing   [] Vision                [] Work    [] Transportation  [] No insurance  [x] Physical limitations    [] Other:    Patient states the following about their diabetes/health:   [x]   Pt states he has recently been diagnosed with diabetes   [x]   Pt has a glucometer at home and monitors his blood sugar every morning, readings between 140 mg/dl - 180 mg/dl  [x]   Pt on insulin at home, able to give his own injections  [x]   Pt eats 3 meals daily, drinks one cup milk daily  [x]   Lives at home with wife    Diabetes survival packet provided to:   [x] Patient     [] Other:    Information given:   Definition of diabetes   Target glucose ranges/A1C   Self-monitoring of blood glucose/ provided blood glucose log and how to use   Prevention/symptoms/treatment of hypo-/hyperglycemia   Medication adherence             The plate method/meal planning guidelines             The benefits of exercise and recommendations             Reducing the risk of chronic complications     Diabetes medications reviewed (use, purpose, action): Discussed action of insulin/ S/S of hypoglycemia and how to treat. Pt states he has not had problems with low blood sugar at home.            Post-education Assessment  [x]  Attentive to teaching  []  Answered questions appropriately when asked   []  Seems able to apply concepts to daily lifestyle  [x]  Seems motivated to do well  []  Verbalized an understanding of plate method  []  Verbalized an understanding of prescribed antidiabetes medications   [x]  Verbalized an understanding of target glucose ranges/A1C level  []  Expresses an intent to comply with treatment plan   []  Showed very little interest in complying with treatment plan   []  Seems to have trouble applying

## 2025-05-22 NOTE — FLOWSHEET NOTE
05/22/25 1145   Vital Signs   /64   Temp 98 °F (36.7 °C)   Pulse 66   Respirations 18   Weight - Scale 97.4 kg (214 lb 11.7 oz)   Weight Method Bed scale   Percent Weight Change -2.99   Pain Assessment   Pain Assessment None - Denies Pain   Post-Hemodialysis Assessment   Post-Treatment Procedures Blood returned;Catheter capped, clamped and heparinized x 2 ports   Machine Disinfection Process Exterior Machine Disinfection   Rinseback Volume (ml) 300 ml   Blood Volume Processed (Liters) 94 L   Dialyzer Clearance Lightly streaked   Duration of Treatment (minutes) 240 minutes   Hemodialysis Intake (ml) 300 ml   Hemodialysis Output (ml) 3000 ml   NET Removed (ml) 2700   Tolerated Treatment Good   Bilateral Breath Sounds Diminished   Edema Sacral;Left upper extremity;Right lower extremity   RLE Edema +1   LLE Edema +1   Sacral Edema +2   Time Off 1130   Patient Disposition Remain in ICU/ED   Observations & Evaluations   Level of Consciousness 0   Oriented X 3   Heart Rhythm Regular  (V paced)   Respiratory Quality/Effort Unlabored   O2 Device Simple Mask   Skin Color Pink   Skin Condition/Temp Dry;Warm   Abdomen Inspection Other (Comment)  (Edema posterior abd)   Comments Pt tolerated well, net UF 2.7 L. TDC functions well.   Access   Add Access? Yes  (R chest TDC - heparin locked.)

## 2025-05-22 NOTE — PROGRESS NOTES
[facility]   Department of Internal Medicine   Internal Medicine Residency  MICU Progress Note    Patient:  Wander Rocha 69 y.o. male   MRN: [id number]      Room: [address] [place], [place] [address]    Admission date: 5/14/2025  2:21 PM ; Hospital day: 8   ICU day: 6d 11h      Allergy: Patient has no known allergies.    Subjective     Patient was seen and examined this morning at bedside. No events overnight. He denies chest pain, SOB, N/V.    Objective       I & O - 24hr:    Intake/Output Summary (Last 24 hours) at 5/22/2025 0818  Last data filed at 5/21/2025 2032  Gross per 24 hour   Intake 391.86 ml   Output 3300 ml   Net -2908.14 ml     Net IO Since Admission: -859.95 mL [05/22/25 0818]    Physical Exam  BP (!) 110/55   Pulse 63   Temp 97.1 °F (36.2 °C) (Temporal)   Resp 16   Ht 1.702 m (5' 7\")   Wt 100.4 kg (221 lb 5.5 oz)   SpO2 99%   BMI 34.67 kg/m²   Ideal body weight: 66.1 kg (145 lb 11.6 oz)    General Appearance: alert, appears stated age, cooperative, and no distress. Prince with clear yellow urine. HFNC at 8 L saturating 100%.  HEENT: nose with no external bleeding  Lung: clear to auscultation bilaterally  Heart: S1, S2 normal and sinus rhythm , systolic murmur present  Abdomen:  distended, no tenderness, normal BS  Extremities:   LE edema 2-3+   Neurologic: alert and oriented x3      Medications     Continuous Infusions:   dextrose      insulin Stopped (05/20/25 1009)    VASOpressin 20 Units in sodium chloride 0.9 % 100 mL IVPB ([id number]) Stopped (05/18/25 1653)    norepinephrine Stopped (05/19/25 1157)    sodium chloride      sodium chloride      dextrose      sodium chloride       Scheduled Meds:   metOLazone  5 mg Oral Daily    insulin glargine  15 Units SubCUTAneous Nightly    insulin lispro  0-4 Units SubCUTAneous 4x Daily AC & HS    midodrine  20 mg Oral TID WC    lidocaine  1 patch TransDERmal Daily    [Held by provider] lactulose  20 g Oral TID    [Held by provider] rifAXIMin  400 mg Oral TID

## 2025-05-23 ENCOUNTER — APPOINTMENT (OUTPATIENT)
Dept: GENERAL RADIOLOGY | Age: 70
DRG: 871 | End: 2025-05-23
Payer: MEDICARE

## 2025-05-23 LAB
ALBUMIN SERPL-MCNC: 3.8 G/DL (ref 3.5–5.2)
ALP SERPL-CCNC: 69 U/L (ref 40–129)
ALT SERPL-CCNC: 25 U/L (ref 0–50)
ANION GAP SERPL CALCULATED.3IONS-SCNC: 14 MMOL/L (ref 7–16)
AST SERPL-CCNC: 35 U/L (ref 0–50)
BASOPHILS # BLD: 0 K/UL (ref 0–0.2)
BASOPHILS NFR BLD: 0 % (ref 0–2)
BILIRUB SERPL-MCNC: 0.8 MG/DL (ref 0–1.2)
BUN SERPL-MCNC: 28 MG/DL (ref 8–23)
CALCIUM SERPL-MCNC: 8.6 MG/DL (ref 8.8–10.2)
CHLORIDE SERPL-SCNC: 100 MMOL/L (ref 98–107)
CO2 SERPL-SCNC: 23 MMOL/L (ref 22–29)
CREAT SERPL-MCNC: 2.9 MG/DL (ref 0.7–1.2)
EOSINOPHIL # BLD: 0.88 K/UL (ref 0.05–0.5)
EOSINOPHILS RELATIVE PERCENT: 3 % (ref 0–6)
ERYTHROCYTE [DISTWIDTH] IN BLOOD BY AUTOMATED COUNT: 18.4 % (ref 11.5–15)
GFR, ESTIMATED: 23 ML/MIN/1.73M2
GLUCOSE BLD-MCNC: 124 MG/DL (ref 74–99)
GLUCOSE BLD-MCNC: 139 MG/DL (ref 74–99)
GLUCOSE BLD-MCNC: 140 MG/DL (ref 74–99)
GLUCOSE BLD-MCNC: 161 MG/DL (ref 74–99)
GLUCOSE SERPL-MCNC: 111 MG/DL (ref 74–99)
HCT VFR BLD AUTO: 26.1 % (ref 37–54)
HGB BLD-MCNC: 8.1 G/DL (ref 12.5–16.5)
LYMPHOCYTES NFR BLD: 0.66 K/UL (ref 1.5–4)
LYMPHOCYTES RELATIVE PERCENT: 3 % (ref 20–42)
MAGNESIUM SERPL-MCNC: 2 MG/DL (ref 1.6–2.4)
MCH RBC QN AUTO: 30.7 PG (ref 26–35)
MCHC RBC AUTO-ENTMCNC: 31 G/DL (ref 32–34.5)
MCV RBC AUTO: 98.9 FL (ref 80–99.9)
METAMYELOCYTES ABSOLUTE COUNT: 1.32 K/UL (ref 0–0.12)
METAMYELOCYTES: 5 % (ref 0–1)
MICROORGANISM/AGENT SPEC: ABNORMAL
MONOCYTES NFR BLD: 10 % (ref 2–12)
MONOCYTES NFR BLD: 2.42 K/UL (ref 0.1–0.95)
MYELOCYTES ABSOLUTE COUNT: 0.22 K/UL
MYELOCYTES: 1 %
NEUTROPHILS NFR BLD: 78 % (ref 43–80)
NEUTS SEG NFR BLD: 20 K/UL (ref 1.8–7.3)
NUCLEATED RED BLOOD CELLS: 3 PER 100 WBC
PHOSPHATE SERPL-MCNC: 3.6 MG/DL (ref 2.5–4.5)
PLATELET # BLD AUTO: 154 K/UL (ref 130–450)
PMV BLD AUTO: 10.2 FL (ref 7–12)
POTASSIUM SERPL-SCNC: 3.9 MMOL/L (ref 3.5–5.1)
PROT SERPL-MCNC: 6.4 G/DL (ref 6.4–8.3)
RBC # BLD AUTO: 2.64 M/UL (ref 3.8–5.8)
RBC # BLD: ABNORMAL 10*6/UL
SODIUM SERPL-SCNC: 136 MMOL/L (ref 136–145)
SPECIMEN DESCRIPTION: ABNORMAL
WBC OTHER # BLD: 25.5 K/UL (ref 4.5–11.5)

## 2025-05-23 PROCEDURE — 6370000000 HC RX 637 (ALT 250 FOR IP)

## 2025-05-23 PROCEDURE — 94640 AIRWAY INHALATION TREATMENT: CPT

## 2025-05-23 PROCEDURE — 2700000000 HC OXYGEN THERAPY PER DAY

## 2025-05-23 PROCEDURE — 84100 ASSAY OF PHOSPHORUS: CPT

## 2025-05-23 PROCEDURE — 1200000000 HC SEMI PRIVATE

## 2025-05-23 PROCEDURE — 6360000002 HC RX W HCPCS

## 2025-05-23 PROCEDURE — 71045 X-RAY EXAM CHEST 1 VIEW: CPT

## 2025-05-23 PROCEDURE — 80053 COMPREHEN METABOLIC PANEL: CPT

## 2025-05-23 PROCEDURE — 90935 HEMODIALYSIS ONE EVALUATION: CPT

## 2025-05-23 PROCEDURE — 99232 SBSQ HOSP IP/OBS MODERATE 35: CPT | Performed by: INTERNAL MEDICINE

## 2025-05-23 PROCEDURE — 83735 ASSAY OF MAGNESIUM: CPT

## 2025-05-23 PROCEDURE — 87040 BLOOD CULTURE FOR BACTERIA: CPT

## 2025-05-23 PROCEDURE — 6370000000 HC RX 637 (ALT 250 FOR IP): Performed by: INTERNAL MEDICINE

## 2025-05-23 PROCEDURE — 2500000003 HC RX 250 WO HCPCS

## 2025-05-23 PROCEDURE — 85025 COMPLETE CBC W/AUTO DIFF WBC: CPT

## 2025-05-23 PROCEDURE — 36415 COLL VENOUS BLD VENIPUNCTURE: CPT

## 2025-05-23 PROCEDURE — 87205 SMEAR GRAM STAIN: CPT

## 2025-05-23 PROCEDURE — 92526 ORAL FUNCTION THERAPY: CPT

## 2025-05-23 PROCEDURE — 82962 GLUCOSE BLOOD TEST: CPT

## 2025-05-23 PROCEDURE — 2580000003 HC RX 258

## 2025-05-23 PROCEDURE — 94660 CPAP INITIATION&MGMT: CPT

## 2025-05-23 RX ORDER — HYDROCODONE BITARTRATE AND ACETAMINOPHEN 5; 325 MG/1; MG/1
1 TABLET ORAL EVERY 4 HOURS PRN
Status: DISCONTINUED | OUTPATIENT
Start: 2025-05-23 | End: 2025-05-25

## 2025-05-23 RX ADMIN — IPRATROPIUM BROMIDE AND ALBUTEROL SULFATE 1 DOSE: 2.5; .5 SOLUTION RESPIRATORY (INHALATION) at 09:08

## 2025-05-23 RX ADMIN — SODIUM CHLORIDE, PRESERVATIVE FREE 10 ML: 5 INJECTION INTRAVENOUS at 21:09

## 2025-05-23 RX ADMIN — ATORVASTATIN CALCIUM 40 MG: 40 TABLET, FILM COATED ORAL at 08:39

## 2025-05-23 RX ADMIN — MIDODRINE HYDROCHLORIDE 20 MG: 10 TABLET ORAL at 16:14

## 2025-05-23 RX ADMIN — METOLAZONE 5 MG: 5 TABLET ORAL at 08:39

## 2025-05-23 RX ADMIN — PIPERACILLIN AND TAZOBACTAM 4500 MG: 4; .5 INJECTION, POWDER, LYOPHILIZED, FOR SOLUTION INTRAVENOUS at 01:24

## 2025-05-23 RX ADMIN — MIDODRINE HYDROCHLORIDE 20 MG: 10 TABLET ORAL at 08:39

## 2025-05-23 RX ADMIN — APIXABAN 5 MG: 5 TABLET, FILM COATED ORAL at 08:39

## 2025-05-23 RX ADMIN — MIDODRINE HYDROCHLORIDE 20 MG: 10 TABLET ORAL at 11:41

## 2025-05-23 RX ADMIN — SODIUM CHLORIDE, PRESERVATIVE FREE 10 ML: 5 INJECTION INTRAVENOUS at 08:40

## 2025-05-23 RX ADMIN — IPRATROPIUM BROMIDE AND ALBUTEROL SULFATE 1 DOSE: 2.5; .5 SOLUTION RESPIRATORY (INHALATION) at 19:52

## 2025-05-23 RX ADMIN — APIXABAN 5 MG: 5 TABLET, FILM COATED ORAL at 21:08

## 2025-05-23 RX ADMIN — SERTRALINE 50 MG: 50 TABLET, FILM COATED ORAL at 08:39

## 2025-05-23 RX ADMIN — SALINE NASAL SPRAY 1 SPRAY: 1.5 SOLUTION NASAL at 09:00

## 2025-05-23 RX ADMIN — HYDROCODONE BITARTRATE AND ACETAMINOPHEN 1 TABLET: 5; 325 TABLET ORAL at 17:46

## 2025-05-23 RX ADMIN — Medication 2000 UNITS: at 08:39

## 2025-05-23 RX ADMIN — LEVOTHYROXINE SODIUM 100 MCG: 0.1 TABLET ORAL at 06:07

## 2025-05-23 RX ADMIN — ASPIRIN 81 MG: 81 TABLET, COATED ORAL at 08:39

## 2025-05-23 RX ADMIN — INSULIN GLARGINE 15 UNITS: 100 INJECTION, SOLUTION SUBCUTANEOUS at 21:13

## 2025-05-23 RX ADMIN — CEFTRIAXONE SODIUM 1000 MG: 1 INJECTION, POWDER, FOR SOLUTION INTRAMUSCULAR; INTRAVENOUS at 11:41

## 2025-05-23 RX ADMIN — SALINE NASAL SPRAY 1 SPRAY: 1.5 SOLUTION NASAL at 21:14

## 2025-05-23 RX ADMIN — BUMETANIDE 2 MG: 1 TABLET ORAL at 08:39

## 2025-05-23 RX ADMIN — BUMETANIDE 2 MG: 1 TABLET ORAL at 17:46

## 2025-05-23 RX ADMIN — LEVOTHYROXINE SODIUM 50 MCG: 0.05 TABLET ORAL at 06:19

## 2025-05-23 ASSESSMENT — PAIN SCALES - GENERAL: PAINLEVEL_OUTOF10: 8

## 2025-05-23 ASSESSMENT — PAIN DESCRIPTION - LOCATION: LOCATION: BACK

## 2025-05-23 ASSESSMENT — PAIN DESCRIPTION - DESCRIPTORS: DESCRIPTORS: ACHING;SORE

## 2025-05-23 NOTE — PLAN OF CARE
Problem: Safety - Adult  Goal: Free from fall injury  Outcome: Progressing     Problem: Chronic Conditions and Co-morbidities  Goal: Patient's chronic conditions and co-morbidity symptoms are monitored and maintained or improved  5/23/2025 0931 by Dora Alvarez RN  Outcome: Progressing  5/23/2025 0149 by Toyin Avendano RN  Outcome: Progressing     Problem: Discharge Planning  Goal: Discharge to home or other facility with appropriate resources  Outcome: Progressing     Problem: Skin/Tissue Integrity  Goal: Skin integrity remains intact  Description: 1.  Monitor for areas of redness and/or skin breakdown2.  Assess vascular access sites hourly3.  Every 4-6 hours minimum:  Change oxygen saturation probe site4.  Every 4-6 hours:  If on nasal continuous positive airway pressure, respiratory therapy assess nares and determine need for appliance change or resting period  5/23/2025 0931 by Dora Alvarez RN  Outcome: Progressing  5/23/2025 0149 by Toyin Avendano RN  Outcome: Progressing     Problem: ABCDS Injury Assessment  Goal: Absence of physical injury  Outcome: Progressing     Problem: Pain  Goal: Verbalizes/displays adequate comfort level or baseline comfort level  Outcome: Progressing     Problem: Neurosensory - Adult  Goal: Achieves stable or improved neurological status  Outcome: Progressing     Problem: Respiratory - Adult  Goal: Achieves optimal ventilation and oxygenation  Outcome: Progressing     Problem: Cardiovascular - Adult  Goal: Maintains optimal cardiac output and hemodynamic stability  Outcome: Progressing     Problem: Nutrition Deficit:  Goal: Optimize nutritional status  Outcome: Progressing

## 2025-05-23 NOTE — CONSULTS
Department of Internal Medicine  Infectious Diseases   Consult Note      Reason for Consult:  Leukocytosis       Requesting Physician:  Dr Rosenberg         HISTORY OF PRESENT ILLNESS:                The patient is a 69 y.o. male with hx of a fib , HTN, hypothyroidism , lung nodule, CKD was admitted to MICU on 5/14/2025 with worsening renal function/ he was started on HD . He was hypotensive in the ICU - was placed on IV vancomycin and zosyn empirically . Blood cx neg. WBC up to  25 K, E 880  New urine cx - Klebsiella   Pt was started on IV rocephin       Past Medical History:    Past Medical History:   Diagnosis Date    RADHA (acute kidney injury) 03/10/2022    Atrial fibrillation (HCC)     Carpal tunnel syndrome     Colorectal polyps 04/15/2013    Diverticula of colon 04/15/2013    Encounter regarding vascular access for dialysis for ESRD (HCC) 03/12/2022    Hyperlipidemia     Hypertension     Hypothyroidism     Lung nodule     Lung nodules 04/15/2013    Nocturnal hypoxemia due to obesity 08/08/2013    Obesity     NIKO (obstructive sleep apnea) 08/08/2013    [facility]    Osteoarthritis     Pacemaker     DOI 10/3/2017 [employer]tr[employer]; dual chamber; MRI conditional  Indication: Sinus node dysfunction     Pacemaker     DOI 10/3/2017  [employer]tronic; dual chamber; MRI conditional   Indication: Sinus node dysfunction       Pinched nerve     Lumbar    Restrictive lung disease secondary to obesity 07/25/2016    S/P laminectomy with spinal fusion 04/15/2013    Done at [facility], March 2012     Sinus node dysfunction (HCC)     Skin lesion of face 11/16/2022    Type II or unspecified type diabetes mellitus without mention of complication, not stated as uncontrolled          Past Surgical History:      Past Surgical History:   Procedure Laterality Date    BACK SURGERY  2012    [facility]. Pinched nerve in back    BACK SURGERY  05/30/2017    BACK SURGERY N/A 11/8/2022    EXCISON OF SOFT TISSUE NEOPLASM OF UPPER

## 2025-05-23 NOTE — PROGRESS NOTES
SPEECH LANGUAGE PATHOLOGY  DAILY PROGRESS NOTE      PATIENT NAME:  Wander Rocha      :  1955          TODAY'S DATE:  2025 ROOM:  8417/8417-A    Current Diet: ADULT ORAL NUTRITION SUPPLEMENT; Breakfast, Dinner; Diabetic Oral Supplement  ADULT DIET; Dysphagia - Soft and Bite Sized    Patient was seen for ongoing dysphagia therapy to ensure that he is consuming the least restrictive diet. Patient completed a MBSS on 25 the study revealed mild- moderate oral and mild pharyngeal phase dysphagia. At the time of the study, he was recommended a soft and bite size diet with thin liquid. RN cleared patient for therapy and reported he disliked his current diet recommendation. At bedside, he was administered soft and bite size food with thin liquid via cup/straw. He presented with mild-moderate oral phase dysphagia with soft solid food. Therapist noted that patient displayed large bites, prolonged mastication, and oral residue with soft solid foods. He benefited from verbal cueing to take small bites and clear his mouth before taking another bite. No oral phase dysphagia was noted with thin liquid via cup/straw. Intermittent throat clearing was observed with soft solid textured foods. Wife was present at bedside and was educated on his recent MBSS results. She verbalized her understanding.    Recommendation: Continue current diet of soft and bite size diet with thin liquid via cup/straw. Patient is encouraged to use compensatory strategies when eating/drinking to reduce risks for aspiration.       CPT code(s) 92096  dysphagia tx  Total minutes :  15 minutes    Andrew Wolfe M.S., CCC-SLP/L   Speech-Language Pathologist  [id number]

## 2025-05-23 NOTE — PLAN OF CARE
Problem: Chronic Conditions and Co-morbidities  Goal: Patient's chronic conditions and co-morbidity symptoms are monitored and maintained or improved  Outcome: Progressing     Problem: Skin/Tissue Integrity  Goal: Skin integrity remains intact  Description: 1.  Monitor for areas of redness and/or skin breakdown2.  Assess vascular access sites hourly3.  Every 4-6 hours minimum:  Change oxygen saturation probe site4.  Every 4-6 hours:  If on nasal continuous positive airway pressure, respiratory therapy assess nares and determine need for appliance change or resting period  Outcome: Progressing

## 2025-05-23 NOTE — CARE COORDINATION
Social Work/Case Management Transition of Care Planning ([facility] [phone]):  Per report and chart review, patient had an MBSS on 5/22.  He is on soft bite sized diet with thin liquids.  He remains on IV Zosyn q12.  Patient is on dialysis.  Nephrology is following for management of dialysis.  He has a right IJ vein TDC placement. PT/OT scores noted to be 10/15.  He was only able to take a few side steps with FWW mod assist.   Referral was made to [facility].  Pre-cert was initiated on 5/21. Spoke with Vira and the referral remains pending.   Referral was also sent to [facility] for new dialysis set up.  Met with patient and his wife, Marilyn, at bedside.  Wife stated if  patient  is denied [facility], she does not want him to return to [facility].  She stated they were not giving him his meds correctly and she will take him home before she will allow him to go back there.  She did not want to discuss other options until she knows for certain about [facility].  CM/SW will follow.   MYRANDA Recio  5/23/2025    Update:  Per Cherie of [facility], the insurance denied admission.  She indicated she will see if their doctor will do a peer to peer.  Phone call to patient's wife to get alternate [facility] choices.  Only able to leave a message at this time.  CM/SW will follow.  MYRANDA Recio  5/23/2025

## 2025-05-23 NOTE — FLOWSHEET NOTE
05/23/25 1424   Vital Signs   /67   Temp 96.9 °F (36.1 °C)   Pulse 60   Respirations 18   Weight - Scale 92 kg (202 lb 13.2 oz)   Weight Method Bed scale   Percent Weight Change -0.86   Pain Assessment   Pain Assessment None - Denies Pain   Post-Hemodialysis Assessment   Post-Treatment Procedures Blood returned;Catheter capped, clamped with Citrate x 2 ports   Machine Disinfection Process Exterior Machine Disinfection;Acid/Vinegar Clean;Heat Disinfect   Rinseback Volume (ml) 300 ml   Dialyzer Clearance Moderately streaked   Duration of Treatment (minutes) 120 minutes   Hemodialysis Intake (ml) 300 ml   Hemodialysis Output (ml) 2500 ml   NET Removed (ml) 2200   Tolerated Treatment Good   Patient Response to Treatment Patient tolerated UF well   Bilateral Breath Sounds Diminished   Edema Generalized   Time Off 1418   Patient Disposition Return to room   Observations & Evaluations   Level of Consciousness 0   Heart Rhythm Regular   Respiratory Quality/Effort Unlabored   Handoff   Communication Given Shift Handoff   Handoff Given To Thelma CHAU   Handoff Received From Rebecca CHAU   Handoff Communication Telephone   Time Handoff Given 0730

## 2025-05-23 NOTE — PROGRESS NOTES
Department of Internal Medicine  Nephrology Attending Consult Note      Events reviewed.    SUBJECTIVE: We are following Mr. Wander Rocha for CKD.  Reports no new complaints.    PHYSICAL EXAM:      Vitals:    VITALS:  BP (!) 123/58   Pulse 62   Temp 97.2 °F (36.2 °C) (Temporal)   Resp 19   Ht 1.702 m (5' 7\")   Wt 92.8 kg (204 lb 9.4 oz)   SpO2 95%   BMI 32.04 kg/m²   24HR INTAKE/OUTPUT:  No intake or output data in the 24 hours ending 05/23/25 1353    Access: Right IJ vein TDC catheter in place  Constitutional:  Awake, alert, oriented, in NAD  HEENT:  PERRLA, normocephalic, atraumatic  Respiratory:  wheezes  Cardiovascular/Edema:  RRR, normal S1, normal S2, + edema  Gastrointestinal: Distended  Neurologic:  Nonfocal  Skin:  warm, dry, no rashes, no lesions    Scheduled Meds:   cefTRIAXone (ROCEPHIN) IV  1,000 mg IntraVENous Q24H    metOLazone  5 mg Oral Daily    insulin glargine  15 Units SubCUTAneous Nightly    insulin lispro  0-4 Units SubCUTAneous 4x Daily AC & HS    midodrine  20 mg Oral TID WC    lidocaine  1 patch TransDERmal Daily    [Held by provider] lactulose  20 g Oral TID    [Held by provider] rifAXIMin  400 mg Oral TID    ipratropium 0.5 mg-albuterol 2.5 mg  1 Dose Inhalation Q4H WA RT    [Held by provider] sevelamer  0.8 g Oral TID WC    sodium chloride  1 spray Each Nostril TID    [Held by provider] metoprolol succinate  12.5 mg Oral Daily    epoetin lay-epbx  3,000 Units SubCUTAneous Once per day on Tuesday Thursday Saturday    sodium chloride flush  5-40 mL IntraVENous 2 times per day    [Held by provider] allopurinol  200 mg Oral Daily    apixaban  5 mg Oral BID    aspirin  81 mg Oral Daily    atorvastatin  40 mg Oral Daily    bumetanide  2 mg Oral BID    vitamin D  2,000 Units Oral Daily    levothyroxine  100 mcg Oral Daily    levothyroxine  50 mcg Oral Daily    sertraline  50 mg Oral Daily    [Held by provider] tamsulosin  0.4 mg Oral Daily     Continuous Infusions:   dextrose      sodium

## 2025-05-23 NOTE — PROGRESS NOTES
ENDOCRINOLOGY PROGRESS NOTE      Date of admission: 5/14/2025  Date of service: 5/22/2025  Admitting physician: Martell Rosenberg MD   Primary Care Physician: Oly Crespo MD  Consultant physician: Luiz Luciano MD     Reason for the consultation:  Uncontrolled DM    History of Present Illness:  The history is provided by the patient. Accuracy of the patient data is excellent    Wander Rocha is a very pleasant 69 y.o. old male with PMH of poorly controlled type 2 diabetes, CKD, A-fib on oral anticoagulation, primary hypothyroidism, dyslipidemia and other listed below admitted to [facility] on 5/14/2025 because of RADHA, endocrine service was consulted for diabetes management.     Subjective  Patient was seen and examined at bedside this afternoon, no acute events overnight, glucose level improving     Point of care glucose monitoring   (Independently reviewed)   Recent Labs     05/21/25  0716 05/21/25  0752 05/21/25  1126 05/21/25  1628 05/21/25  2012 05/22/25  0640 05/22/25  1122 05/22/25  1735   POCGLU 93 92 141* 136* 162* 130* 110* 131*      Allergy and drug reactions:   No Known Allergies    Scheduled Meds:   metOLazone  5 mg Oral Daily    insulin glargine  15 Units SubCUTAneous Nightly    insulin lispro  0-4 Units SubCUTAneous 4x Daily AC & HS    midodrine  20 mg Oral TID WC    lidocaine  1 patch TransDERmal Daily    [Held by provider] lactulose  20 g Oral TID    [Held by provider] rifAXIMin  400 mg Oral TID    piperacillin-tazobactam  4,500 mg IntraVENous Q12H    ipratropium 0.5 mg-albuterol 2.5 mg  1 Dose Inhalation Q4H WA RT    [Held by provider] sevelamer  0.8 g Oral TID WC    sodium chloride  1 spray Each Nostril TID    [Held by provider] metoprolol succinate  12.5 mg Oral Daily    epoetin lay-epbx  3,000 Units SubCUTAneous Once per day on Tuesday Thursday Saturday    sodium chloride flush  5-40 mL IntraVENous 2 times per day    [Held by provider] allopurinol  200 mg Oral Daily    apixaban  5 mg Oral BID             XR CHEST PORTABLE   Final Result   1. Large right and small left pleural effusions with bilateral lower lobe opacities and increased perihilar interstitial markings, compatible with volume overload.   2. Enlarged cardiac silhouette with left-sided dual chamber pacer.            IR TUNNELED CVC PLACE WO SQ PORT/PUMP > 5 YEARS    (Results Pending)   IR ULTRASOUND GUIDANCE VASCULAR ACCESS    (Results Pending)   IR FLUORO GUIDED CVA DEVICE PLMT/REPLACE/REMOVAL    (Results Pending)   XR CHEST PORTABLE    (Results Pending)       Medical Records/Labs/Images review:   I personally reviewed and summarized previous records   All labs and imaging were reviewed independently     ASSESSMENT & PLAN   Wnader Rocha, a 69 y.o.-old male seen today for inpatient diabetes management    Diabetes Mellitus type 2 with euglycemic DKA  Glucose level better   We recommend the following subcu insulin regimen  Change Lantus to 15 units daily in the morning  Low-dose sliding scale ACHS  Continue glucose check with meals and at bedtime  Will titrate insulin dose based on the blood glucose trend & insulin requirement  Upon discharge, I will arrange for the patient to be seen in the endocrinology clinic for routine diabetes maintenance and prevention    Steroid-induced hyperglycemia  Now  off steroids.  We recommend the insulin regimen as per above    Primary hypothyroidism  On levothyroxine 150 mcg daily  Will check TSH and free T4 tomorrow morning  Blood work from April 2025 showed a huge discrepancy between TSH and free T4 consistent with medication noncompliance      Interdisciplinary plan for communication with healthcare providers:   Consult recommendations were discussed with the Primary Service/Nursing staff      The above issues were reviewed with the patient who understood and agreed with the plan.    Thank you for allowing us to participate in the care of this patient. Please do not hesitate to contact us with any additional

## 2025-05-23 NOTE — PROGRESS NOTES
ENDOCRINOLOGY PROGRESS NOTE      Date of admission: 5/14/2025  Date of service: 5/23/2025  Admitting physician: Martell Rosenberg MD   Primary Care Physician: Oly Crespo MD  Consultant physician: Luiz Luciano MD     Reason for the consultation:  Uncontrolled DM    History of Present Illness:  The history is provided by the patient. Accuracy of the patient data is excellent    Wander Rocha is a very pleasant 69 y.o. old male with PMH of poorly controlled type 2 diabetes, CKD, A-fib on oral anticoagulation, primary hypothyroidism, dyslipidemia and other listed below admitted to [facility] on 5/14/2025 because of RADHA, endocrine service was consulted for diabetes management.     Subjective  There was no acute events overnight, glucose level in range most of the time.  No hypoglycemia.  Out of ICU.    Point of care glucose monitoring   (Independently reviewed)   Recent Labs     05/21/25  1628 05/21/25  2012 05/22/25  0640 05/22/25  1122 05/22/25  1735 05/22/25  2138 05/23/25  0605 05/23/25  1110   POCGLU 136* 162* 130* 110* 131* 116* 124* 161*      Allergy and drug reactions:   No Known Allergies    Scheduled Meds:   cefTRIAXone (ROCEPHIN) IV  1,000 mg IntraVENous Q24H    metOLazone  5 mg Oral Daily    insulin glargine  15 Units SubCUTAneous Nightly    insulin lispro  0-4 Units SubCUTAneous 4x Daily AC & HS    midodrine  20 mg Oral TID WC    lidocaine  1 patch TransDERmal Daily    [Held by provider] lactulose  20 g Oral TID    [Held by provider] rifAXIMin  400 mg Oral TID    ipratropium 0.5 mg-albuterol 2.5 mg  1 Dose Inhalation Q4H WA RT    [Held by provider] sevelamer  0.8 g Oral TID WC    sodium chloride  1 spray Each Nostril TID    [Held by provider] metoprolol succinate  12.5 mg Oral Daily    epoetin lay-epbx  3,000 Units SubCUTAneous Once per day on Tuesday Thursday Saturday    sodium chloride flush  5-40 mL IntraVENous 2 times per day    [Held by provider] allopurinol  200 mg Oral Daily    apixaban  5 mg Oral

## 2025-05-24 LAB
ALBUMIN SERPL-MCNC: 3.8 G/DL (ref 3.5–5.2)
ALP SERPL-CCNC: 74 U/L (ref 40–129)
ALT SERPL-CCNC: 26 U/L (ref 0–50)
ANION GAP SERPL CALCULATED.3IONS-SCNC: 16 MMOL/L (ref 7–16)
AST SERPL-CCNC: 33 U/L (ref 0–50)
BASOPHILS # BLD: 0 K/UL (ref 0–0.2)
BASOPHILS NFR BLD: 0 % (ref 0–2)
BILIRUB SERPL-MCNC: 0.6 MG/DL (ref 0–1.2)
BUN SERPL-MCNC: 38 MG/DL (ref 8–23)
CALCIUM SERPL-MCNC: 8.9 MG/DL (ref 8.8–10.2)
CHLORIDE SERPL-SCNC: 99 MMOL/L (ref 98–107)
CO2 SERPL-SCNC: 22 MMOL/L (ref 22–29)
CREAT SERPL-MCNC: 3 MG/DL (ref 0.7–1.2)
EOSINOPHIL # BLD: 0.31 K/UL (ref 0.05–0.5)
EOSINOPHILS RELATIVE PERCENT: 2 % (ref 0–6)
ERYTHROCYTE [DISTWIDTH] IN BLOOD BY AUTOMATED COUNT: 19.4 % (ref 11.5–15)
GFR, ESTIMATED: 21 ML/MIN/1.73M2
GLUCOSE BLD-MCNC: 110 MG/DL (ref 74–99)
GLUCOSE BLD-MCNC: 123 MG/DL (ref 74–99)
GLUCOSE BLD-MCNC: 142 MG/DL (ref 74–99)
GLUCOSE BLD-MCNC: 170 MG/DL (ref 74–99)
GLUCOSE SERPL-MCNC: 113 MG/DL (ref 74–99)
HCT VFR BLD AUTO: 28.4 % (ref 37–54)
HGB BLD-MCNC: 8.7 G/DL (ref 12.5–16.5)
LYMPHOCYTES NFR BLD: 1.89 K/UL (ref 1.5–4)
LYMPHOCYTES RELATIVE PERCENT: 10 % (ref 20–42)
MAGNESIUM SERPL-MCNC: 2 MG/DL (ref 1.6–2.4)
MCH RBC QN AUTO: 30.1 PG (ref 26–35)
MCHC RBC AUTO-ENTMCNC: 30.6 G/DL (ref 32–34.5)
MCV RBC AUTO: 98.3 FL (ref 80–99.9)
METAMYELOCYTES ABSOLUTE COUNT: 0.63 K/UL (ref 0–0.12)
METAMYELOCYTES: 4 % (ref 0–1)
MONOCYTES NFR BLD: 1.42 K/UL (ref 0.1–0.95)
MONOCYTES NFR BLD: 8 % (ref 2–12)
MYELOCYTES ABSOLUTE COUNT: 0.31 K/UL
MYELOCYTES: 2 %
NEUTROPHILS NFR BLD: 75 % (ref 43–80)
NEUTS SEG NFR BLD: 13.54 K/UL (ref 1.8–7.3)
PHOSPHATE SERPL-MCNC: 5.3 MG/DL (ref 2.5–4.5)
PLATELET # BLD AUTO: 137 K/UL (ref 130–450)
PMV BLD AUTO: 10.2 FL (ref 7–12)
POTASSIUM SERPL-SCNC: 3.9 MMOL/L (ref 3.5–5.1)
PROT SERPL-MCNC: 6.7 G/DL (ref 6.4–8.3)
RBC # BLD AUTO: 2.89 M/UL (ref 3.8–5.8)
RBC # BLD: ABNORMAL 10*6/UL
SODIUM SERPL-SCNC: 136 MMOL/L (ref 136–145)
WBC OTHER # BLD: 18.1 K/UL (ref 4.5–11.5)

## 2025-05-24 PROCEDURE — 6370000000 HC RX 637 (ALT 250 FOR IP)

## 2025-05-24 PROCEDURE — 84100 ASSAY OF PHOSPHORUS: CPT

## 2025-05-24 PROCEDURE — 2500000003 HC RX 250 WO HCPCS

## 2025-05-24 PROCEDURE — 6370000000 HC RX 637 (ALT 250 FOR IP): Performed by: INTERNAL MEDICINE

## 2025-05-24 PROCEDURE — 90935 HEMODIALYSIS ONE EVALUATION: CPT

## 2025-05-24 PROCEDURE — 99233 SBSQ HOSP IP/OBS HIGH 50: CPT | Performed by: INTERNAL MEDICINE

## 2025-05-24 PROCEDURE — 6360000002 HC RX W HCPCS: Performed by: INTERNAL MEDICINE

## 2025-05-24 PROCEDURE — 83735 ASSAY OF MAGNESIUM: CPT

## 2025-05-24 PROCEDURE — 80053 COMPREHEN METABOLIC PANEL: CPT

## 2025-05-24 PROCEDURE — 36415 COLL VENOUS BLD VENIPUNCTURE: CPT

## 2025-05-24 PROCEDURE — 99232 SBSQ HOSP IP/OBS MODERATE 35: CPT | Performed by: INTERNAL MEDICINE

## 2025-05-24 PROCEDURE — 2700000000 HC OXYGEN THERAPY PER DAY

## 2025-05-24 PROCEDURE — 85025 COMPLETE CBC W/AUTO DIFF WBC: CPT

## 2025-05-24 PROCEDURE — 94640 AIRWAY INHALATION TREATMENT: CPT

## 2025-05-24 PROCEDURE — 82962 GLUCOSE BLOOD TEST: CPT

## 2025-05-24 PROCEDURE — 6360000002 HC RX W HCPCS

## 2025-05-24 PROCEDURE — 1200000000 HC SEMI PRIVATE

## 2025-05-24 PROCEDURE — 94660 CPAP INITIATION&MGMT: CPT

## 2025-05-24 RX ADMIN — Medication 2000 UNITS: at 09:38

## 2025-05-24 RX ADMIN — CEFTRIAXONE SODIUM 1000 MG: 1 INJECTION, POWDER, FOR SOLUTION INTRAMUSCULAR; INTRAVENOUS at 11:40

## 2025-05-24 RX ADMIN — METOLAZONE 5 MG: 5 TABLET ORAL at 09:38

## 2025-05-24 RX ADMIN — APIXABAN 5 MG: 5 TABLET, FILM COATED ORAL at 09:38

## 2025-05-24 RX ADMIN — METOPROLOL SUCCINATE 12.5 MG: 25 TABLET, EXTENDED RELEASE ORAL at 09:37

## 2025-05-24 RX ADMIN — IPRATROPIUM BROMIDE AND ALBUTEROL SULFATE 1 DOSE: 2.5; .5 SOLUTION RESPIRATORY (INHALATION) at 18:11

## 2025-05-24 RX ADMIN — MIDODRINE HYDROCHLORIDE 20 MG: 10 TABLET ORAL at 09:38

## 2025-05-24 RX ADMIN — INSULIN GLARGINE 15 UNITS: 100 INJECTION, SOLUTION SUBCUTANEOUS at 21:02

## 2025-05-24 RX ADMIN — IPRATROPIUM BROMIDE AND ALBUTEROL SULFATE 1 DOSE: 2.5; .5 SOLUTION RESPIRATORY (INHALATION) at 11:18

## 2025-05-24 RX ADMIN — ASPIRIN 81 MG: 81 TABLET, COATED ORAL at 09:38

## 2025-05-24 RX ADMIN — EPOETIN ALFA-EPBX 3000 UNITS: 3000 INJECTION, SOLUTION INTRAVENOUS; SUBCUTANEOUS at 18:57

## 2025-05-24 RX ADMIN — SALINE NASAL SPRAY 1 SPRAY: 1.5 SOLUTION NASAL at 10:17

## 2025-05-24 RX ADMIN — ATORVASTATIN CALCIUM 40 MG: 40 TABLET, FILM COATED ORAL at 09:39

## 2025-05-24 RX ADMIN — MIDODRINE HYDROCHLORIDE 20 MG: 10 TABLET ORAL at 12:25

## 2025-05-24 RX ADMIN — SERTRALINE 50 MG: 50 TABLET, FILM COATED ORAL at 09:38

## 2025-05-24 RX ADMIN — LEVOTHYROXINE SODIUM 50 MCG: 0.05 TABLET ORAL at 06:09

## 2025-05-24 RX ADMIN — SODIUM CHLORIDE, PRESERVATIVE FREE 10 ML: 5 INJECTION INTRAVENOUS at 09:37

## 2025-05-24 RX ADMIN — ACETAMINOPHEN 650 MG: 325 TABLET ORAL at 21:02

## 2025-05-24 RX ADMIN — BUMETANIDE 2 MG: 1 TABLET ORAL at 09:38

## 2025-05-24 RX ADMIN — APIXABAN 5 MG: 5 TABLET, FILM COATED ORAL at 21:02

## 2025-05-24 RX ADMIN — LEVOTHYROXINE SODIUM 100 MCG: 0.1 TABLET ORAL at 06:09

## 2025-05-24 RX ADMIN — SODIUM CHLORIDE, PRESERVATIVE FREE 10 ML: 5 INJECTION INTRAVENOUS at 21:14

## 2025-05-24 RX ADMIN — BUMETANIDE 2 MG: 1 TABLET ORAL at 17:21

## 2025-05-24 RX ADMIN — MIDODRINE HYDROCHLORIDE 20 MG: 10 TABLET ORAL at 17:21

## 2025-05-24 RX ADMIN — IPRATROPIUM BROMIDE AND ALBUTEROL SULFATE 1 DOSE: 2.5; .5 SOLUTION RESPIRATORY (INHALATION) at 07:40

## 2025-05-24 NOTE — PROGRESS NOTES
tamsulosin  0.4 mg Oral Daily     Continuous Infusions:   dextrose      sodium chloride      sodium chloride      dextrose      sodium chloride       PRN Meds:.HYDROcodone 5 mg - acetaminophen, heparin (porcine), dextrose bolus **OR** dextrose bolus, magnesium sulfate, sodium phosphate 15 mmol in sodium chloride 0.9 % 250 mL IVPB, dextrose, HYDROmorphone, sodium chloride, sodium chloride flush, sodium chloride, glucose, dextrose bolus **OR** dextrose bolus, glucagon (rDNA), dextrose, sodium chloride flush, sodium chloride, ondansetron **OR** ondansetron, polyethylene glycol, acetaminophen **OR** acetaminophen      DATA:    CBC:   Lab Results   Component Value Date/Time    WBC 18.1 05/24/2025 04:45 AM    RBC 2.89 05/24/2025 04:45 AM    HGB 8.7 05/24/2025 04:45 AM    HCT 28.4 05/24/2025 04:45 AM    MCV 98.3 05/24/2025 04:45 AM    MCH 30.1 05/24/2025 04:45 AM    MCHC 30.6 05/24/2025 04:45 AM    RDW 19.4 05/24/2025 04:45 AM     05/24/2025 04:45 AM    MPV 10.2 05/24/2025 04:45 AM     CMP:    Lab Results   Component Value Date/Time     05/24/2025 04:45 AM    K 3.9 05/24/2025 04:45 AM    K 5.1 01/08/2023 05:35 AM    CL 99 05/24/2025 04:45 AM    CO2 22 05/24/2025 04:45 AM    BUN 38 05/24/2025 04:45 AM    CREATININE 3.0 05/24/2025 04:45 AM    GFRAA >60 10/03/2022 10:55 AM    LABGLOM 21 05/24/2025 04:45 AM    LABGLOM 60 04/19/2024 10:45 AM    GLUCOSE 113 05/24/2025 04:45 AM    GLUCOSE 133 04/18/2012 08:43 AM    CALCIUM 8.9 05/24/2025 04:45 AM    BILITOT 0.6 05/24/2025 04:45 AM    ALKPHOS 74 05/24/2025 04:45 AM    AST 33 05/24/2025 04:45 AM    ALT 26 05/24/2025 04:45 AM     Magnesium:    Lab Results   Component Value Date/Time    MG 2.0 05/24/2025 04:45 AM     Phosphorus:    Lab Results   Component Value Date/Time    PHOS 5.3 05/24/2025 04:45 AM     Radiology Review:          US RETROPERITONEAL COMPLETE 5/15/25    IMPRESSION:  1. Mildly increased echogenicity of the renal cortices most suggestive of  intrinsic  lay  --------------------------------  Persistent atrial fibrillation, on apixaban  Pacemaker insertion 4/23/2025  NIKO  Hypothyroidism, on levothyroxine  Hyperlipidemia, on atorvastatin  Type II DM, on DKA protocol  Nutrition, npo      Plan:     For HD today   Continue Bumex 2 mg p.o. twice daily  Continue metolazone 5 mg p.o. daily  Continue midodrine 20 mg p.o. 3 times daily  Continue epoetin lay 3000 units 3 times a week  Hold sevelamer  Continue to monitor electrolytes        Electronically signed by JACQUELINE Napier CNP on 5/24/2025 at 11:33 AM    MD:   I saw and evaluated the patient, performing the key elements of the service. I discussed the findings, assessment and plan with NP and agree with her findings and plans as documented in her note.

## 2025-05-24 NOTE — FLOWSHEET NOTE
05/24/25 1625   Vital Signs   /65   Temp 97.7 °F (36.5 °C)   Pulse 58   Respirations 20   Post-Hemodialysis Assessment   Post-Treatment Procedures Blood returned;Catheter capped, clamped and heparinized x 2 ports   Machine Disinfection Process Acid/Vinegar Clean;Heat Disinfect;Exterior Machine Disinfection   Rinseback Volume (ml) 300 ml   Blood Volume Processed (Liters) 68.9 L   Dialyzer Clearance Lightly streaked   Duration of Treatment (minutes) 180 minutes   Hemodialysis Intake (ml) 300 ml   Hemodialysis Output (ml) 2300 ml   NET Removed (ml) 2000   Tolerated Treatment Good   Patient Response to Treatment HD tolerated as ordered.   Patient Disposition Return to room   Observations & Evaluations   Level of Consciousness 0   Oriented X 3   Heart Rhythm Regular   Respiratory Quality/Effort Unlabored

## 2025-05-24 NOTE — PROGRESS NOTES
effusion.         XR CHEST PORTABLE   Final Result   1. Large right and small left pleural effusions with bilateral lower lobe opacities and increased perihilar interstitial markings, compatible with volume overload.   2. Enlarged cardiac silhouette with left-sided dual chamber pacer.            IR TUNNELED CVC PLACE WO SQ PORT/PUMP > 5 YEARS    (Results Pending)   IR ULTRASOUND GUIDANCE VASCULAR ACCESS    (Results Pending)   IR FLUORO GUIDED CVA DEVICE PLMT/REPLACE/REMOVAL    (Results Pending)       Medical Records/Labs/Images review:   I personally reviewed and summarized previous records   All labs and imaging were reviewed independently     ASSESSMENT & PLAN   Wander Rocha, a 69 y.o.-old male seen today for inpatient diabetes management    Diabetes Mellitus type 2   At this time, blood sugar in range.  We recommend the following subcu insulin regimen  Continue Lantus 15 units nightly  Low-dose sliding scale ACHS  If postprandial glucose spike, will add small dose of prandial insulin coverage.  Continue glucose check with meals and at bedtime  Will titrate insulin dose based on the blood glucose trend & insulin requirement  Upon discharge, I will arrange for the patient to be seen in the endocrinology clinic for routine diabetes maintenance and prevention    Steroid-induced hyperglycemia  Now  off steroids.  We recommend the insulin regimen as per above    Primary hypothyroidism  TFTs from this admission showed an elevated TSH of 10 with normal free T4 of 1.0.  The discrepancy between the TSH and the free T4 are consistent with medication noncompliance.  We recommend continue levothyroxine 150 mcg daily  I had a long discussion with the patient and explained to her the importance of taking levothyroxine in the morning on an empty stomach, waiting one hour before eating, avoiding multivitamins containing calcium or iron with it      Interdisciplinary plan for communication with healthcare providers:   Consult  recommendations were discussed with the Primary Service/Nursing staff      The above issues were reviewed with the patient who understood and agreed with the plan.    Thank you for allowing us to participate in the care of this patient. Please do not hesitate to contact us with any additional questions.     Luiz Luciano MD  Endocrinologist, [facility]   [facility]  [facility] MED SURG ONC  [address] [facility] [address]  Dept: [phone]  Loc: [phone]   Phone: [phone]  Fax: [phone]  --------------------------------------------  An electronic signature was used to authenticate this note. Luiz Luciano MD on 5/24/2025 at 11:16 AM

## 2025-05-24 NOTE — PLAN OF CARE
Problem: Safety - Adult  Goal: Free from fall injury  5/23/2025 2240 by Beronica Abdi, RN  Outcome: Progressing  5/23/2025 0931 by Dora Alvarez, RN  Outcome: Progressing

## 2025-05-24 NOTE — PROGRESS NOTES
[facility]  Internal Medicine Residency Program  Progress Note - House Team 2    Patient:  Wander Rocha 69 y.o. male MRN: [id number]     Date of Service: 5/24/2025     CC: shortness of breath    Subjective     Patient was seen and examined at bedside.  Patient was having breakfast.  Patient is saturating fine on nasal cannula.  Alert and oriented.  Nephrology following.  Leukocytosis improved on Rocephin, ID following.      Objective     VS: BP (!) 137/57   Pulse 60   Temp 97 °F (36.1 °C) (Temporal)   Resp 19   Ht 1.702 m (5' 7\")   Wt 94.4 kg (208 lb 1.8 oz)   SpO2 96%   BMI 32.60 kg/m²   ABP (Arterial line BP): (!) 68/57  ABP Mean (Arterial Line Mean): (!) 64 mmHg  I & O - 24hr:   Intake/Output Summary (Last 24 hours) at 5/24/2025 0825  Last data filed at 5/24/2025 0614  Gross per 24 hour   Intake 420 ml   Output 3100 ml   Net -2680 ml     Net IO Since Admission: -6,239.95 mL [05/24/25 0825]    Physical Exam:  General Appearance: Alert and oriented, not in distress.  HEENT: Normocephalic, atraumatic, (+) tunneled HD line, right neck   Neck: midline trachea  Lung: decreased breath sounds L>R  Heart: regular rate and rhythm, no murmur  Abdomen: soft, (+) distended abdomen   Extremities: no cyanosis or edema  Musculokeletal: No joint swelling  Neurologic: Mental status: awake, alert, oriented, and thought content appropriate     Interventions:  Procedures:   Chest tube insertion, right lung (05/18/2025)    Airway: 5 L/min via HFNC     Medications     Continuous Infusions:   dextrose      sodium chloride      sodium chloride      dextrose      sodium chloride       Scheduled Meds:   cefTRIAXone (ROCEPHIN) IV  1,000 mg IntraVENous Q24H    metOLazone  5 mg Oral Daily    insulin glargine  15 Units SubCUTAneous Nightly    insulin lispro  0-4 Units SubCUTAneous 4x Daily AC & HS    midodrine  20 mg Oral TID WC    lidocaine  1 patch TransDERmal Daily    [Held by provider] lactulose  20 g Oral TID  ADAN      amikacin <=1  Sensitive      ampicillin >=32  Resistant      ampicillin-sulbactam 16  Intermediate      aztreonam <=1  Sensitive      ceFAZolin 2  Intermediate  [1]       cefepime <=0.12  Sensitive      cefotaxime <=0.25  Sensitive      cefOXitin 32  Resistant      cefTAZidime <=0.5  Sensitive      cefTAZidime-avibactam 0.5  Sensitive      Ceftolozane/Tazobactam (Zerbaxa) 1  Sensitive      cefTRIAXone <=0.25  Sensitive      Cefuroxime axetil 32  Resistant      Cefuroxime-Sodium 32  Resistant      ciprofloxacin 0.5  Intermediate      ertapenem <=0.12  Sensitive      imipenem <=0.25  Sensitive      Imipenem-Relebactam <=0.25  Sensitive      levofloxacin 1  Intermediate      meropenem <=0.25  Sensitive      Meropenem-vaborbactam <=0.5  Sensitive      nitrofurantoin 256  Resistant      piperacillin-tazobactam 16  Intermediate      tobramycin <=1  Sensitive      trimethoprim-sulfamethoxazole <=20  Sensitive                   [1]  Cefazolin sensitivity results can be used to predict the effectiveness of oral   cephalosporins (eg. Cephalexin) in uncomplicated Urinary Tract Infections due to E. coli, K.   pneumoniae, and P. mirabilis                      Gram stain [[id number]] Collected: 05/18/25 1401    Order Status: Canceled Specimen: Body Fluid     Culture, Body Fluid (with Gram Stain) [[id number]] Collected: 05/18/25 1401    Order Status: Completed Specimen: Body Fluid from Pleural Fluid Updated: 05/20/25 1107     Specimen Description .PLEURAL FLUID     Special Requests Site: Body Fluid     Direct Exam Gram stain performed on unspun fluid.      RARE Polymorphonuclear leukocytes      NO EPITHELIAL CELLS      NO ORGANISMS SEEN     Culture NO GROWTH    Culture with Smear, Acid Fast Bacillius [[id number]] Collected: 05/18/25 1401    Order Status: Completed Specimen: Pleural Fluid Updated: 05/22/25 0636     Specimen Description .PLEURAL FLUID     Direct Exam No Acid Fast Bacilli seen by fluorescent stain.

## 2025-05-24 NOTE — PROGRESS NOTES
LABGLOM 60 04/19/2024 10:45 AM    GLUCOSE 113 05/24/2025 04:45 AM    GLUCOSE 133 04/18/2012 08:43 AM    CALCIUM 8.9 05/24/2025 04:45 AM    BILITOT 0.6 05/24/2025 04:45 AM    ALKPHOS 74 05/24/2025 04:45 AM    AST 33 05/24/2025 04:45 AM    ALT 26 05/24/2025 04:45 AM         Hepatic Function Panel:    Lab Results   Component Value Date/Time    ALKPHOS 74 05/24/2025 04:45 AM    ALT 26 05/24/2025 04:45 AM    AST 33 05/24/2025 04:45 AM    BILITOT 0.6 05/24/2025 04:45 AM    BILIDIR 0.5 05/03/2025 08:33 AM    IBILI 0.4 05/03/2025 08:33 AM       PT/INR:    Lab Results   Component Value Date/Time    PROTIME 17.8 05/19/2025 07:51 AM    INR 1.6 05/19/2025 07:51 AM       TSH:    Lab Results   Component Value Date/Time    TSH 10.30 05/22/2025 04:08 AM       U/A:    Lab Results   Component Value Date/Time    COLORU Yellow 05/15/2025 08:45 AM    PHUR 5.5 05/15/2025 08:45 AM    PHUR 6.0 12/21/2023 05:01 PM    LABCAST FEW 05/25/2017 02:00 AM    WBCUA 0 TO 5 05/15/2025 08:45 AM    RBCUA 21 TO 50 05/15/2025 08:45 AM    BACTERIA 1+ 05/15/2025 08:45 AM    CLARITYU Clear 01/07/2023 11:06 PM    LEUKOCYTESUR SMALL 05/15/2025 08:45 AM    UROBILINOGEN 0.2 05/15/2025 08:45 AM    BILIRUBINUR NEGATIVE 05/15/2025 08:45 AM    BLOODU Negative 01/07/2023 11:06 PM    GLUCOSEU NEGATIVE 05/15/2025 08:45 AM       ABG:  No results found for: \"POG5BDP\", \"BEART\", \"W4KFZXKN\", \"PHART\", \"THGBART\", \"RRW1TGQ\", \"PO2ART\", \"RIT7TMW\"    MICROBIOLOGY:    Blood culture - negative     Urine Culture -    URINE    Culture KLEBSIELLA PNEUMONIAE 10 to 50,000 CFU/ML Identification by MALDI-TOF [facility]        Susceptibility    Klebsiella pneumoniae (1)    Antibiotic Interpretation ADAN Method Status    ampicillin Resistant >=32 BACTERIAL SUSCEPTIBILITY PANEL ADAN Final    ceFAZolin Intermediate 2 BACTERIAL SUSCEPTIBILITY PANEL ADAN Final     Cefazolin sensitivity results can be used to predict the effectiveness of

## 2025-05-24 NOTE — PLAN OF CARE
Problem: Safety - Adult  Goal: Free from fall injury  Outcome: Progressing     Problem: Chronic Conditions and Co-morbidities  Goal: Patient's chronic conditions and co-morbidity symptoms are monitored and maintained or improved  Outcome: Progressing     Problem: Discharge Planning  Goal: Discharge to home or other facility with appropriate resources  Outcome: Progressing     Problem: Skin/Tissue Integrity  Goal: Skin integrity remains intact  Description: 1.  Monitor for areas of redness and/or skin breakdown2.  Assess vascular access sites hourly3.  Every 4-6 hours minimum:  Change oxygen saturation probe site4.  Every 4-6 hours:  If on nasal continuous positive airway pressure, respiratory therapy assess nares and determine need for appliance change or resting period  Outcome: Progressing     Problem: ABCDS Injury Assessment  Goal: Absence of physical injury  Outcome: Progressing     Problem: Pain  Goal: Verbalizes/displays adequate comfort level or baseline comfort level  Outcome: Progressing     Problem: Neurosensory - Adult  Goal: Achieves stable or improved neurological status  Outcome: Progressing     Problem: Respiratory - Adult  Goal: Achieves optimal ventilation and oxygenation  Outcome: Progressing     Problem: Cardiovascular - Adult  Goal: Maintains optimal cardiac output and hemodynamic stability  Outcome: Progressing     Problem: Nutrition Deficit:  Goal: Optimize nutritional status  Outcome: Progressing

## 2025-05-25 LAB
ALBUMIN SERPL-MCNC: 3.6 G/DL (ref 3.5–5.2)
ALP SERPL-CCNC: 77 U/L (ref 40–129)
ALT SERPL-CCNC: 28 U/L (ref 0–50)
ANION GAP SERPL CALCULATED.3IONS-SCNC: 12 MMOL/L (ref 7–16)
AST SERPL-CCNC: 31 U/L (ref 0–50)
BASOPHILS # BLD: 0 K/UL (ref 0–0.2)
BASOPHILS NFR BLD: 0 % (ref 0–2)
BILIRUB SERPL-MCNC: 0.7 MG/DL (ref 0–1.2)
BUN SERPL-MCNC: 22 MG/DL (ref 8–23)
CALCIUM SERPL-MCNC: 8.5 MG/DL (ref 8.8–10.2)
CHLORIDE SERPL-SCNC: 99 MMOL/L (ref 98–107)
CO2 SERPL-SCNC: 25 MMOL/L (ref 22–29)
CREAT SERPL-MCNC: 2 MG/DL (ref 0.7–1.2)
EOSINOPHIL # BLD: 0.47 K/UL (ref 0.05–0.5)
EOSINOPHILS RELATIVE PERCENT: 4 % (ref 0–6)
ERYTHROCYTE [DISTWIDTH] IN BLOOD BY AUTOMATED COUNT: 20.2 % (ref 11.5–15)
GFR, ESTIMATED: 36 ML/MIN/1.73M2
GLUCOSE BLD-MCNC: 117 MG/DL (ref 74–99)
GLUCOSE BLD-MCNC: 125 MG/DL (ref 74–99)
GLUCOSE BLD-MCNC: 143 MG/DL (ref 74–99)
GLUCOSE BLD-MCNC: 179 MG/DL (ref 74–99)
GLUCOSE SERPL-MCNC: 127 MG/DL (ref 74–99)
HCT VFR BLD AUTO: 26.4 % (ref 37–54)
HGB BLD-MCNC: 7.7 G/DL (ref 12.5–16.5)
LYMPHOCYTES NFR BLD: 1.18 K/UL (ref 1.5–4)
LYMPHOCYTES RELATIVE PERCENT: 9 % (ref 20–42)
MAGNESIUM SERPL-MCNC: 1.9 MG/DL (ref 1.6–2.4)
MCH RBC QN AUTO: 29.2 PG (ref 26–35)
MCHC RBC AUTO-ENTMCNC: 29.2 G/DL (ref 32–34.5)
MCV RBC AUTO: 100 FL (ref 80–99.9)
MONOCYTES NFR BLD: 0.47 K/UL (ref 0.1–0.95)
MONOCYTES NFR BLD: 4 % (ref 2–12)
MYELOCYTES ABSOLUTE COUNT: 0.24 K/UL
MYELOCYTES: 2 %
NEUTROPHILS NFR BLD: 83 % (ref 43–80)
NEUTS SEG NFR BLD: 11.23 K/UL (ref 1.8–7.3)
PHOSPHATE SERPL-MCNC: 5 MG/DL (ref 2.5–4.5)
PLATELET # BLD AUTO: 130 K/UL (ref 130–450)
PMV BLD AUTO: 10 FL (ref 7–12)
POTASSIUM SERPL-SCNC: 3.9 MMOL/L (ref 3.5–5.1)
PROT SERPL-MCNC: 6.3 G/DL (ref 6.4–8.3)
RBC # BLD AUTO: 2.64 M/UL (ref 3.8–5.8)
RBC # BLD: ABNORMAL 10*6/UL
SODIUM SERPL-SCNC: 136 MMOL/L (ref 136–145)
WBC OTHER # BLD: 13.6 K/UL (ref 4.5–11.5)

## 2025-05-25 PROCEDURE — 6370000000 HC RX 637 (ALT 250 FOR IP)

## 2025-05-25 PROCEDURE — 99232 SBSQ HOSP IP/OBS MODERATE 35: CPT | Performed by: INTERNAL MEDICINE

## 2025-05-25 PROCEDURE — 84100 ASSAY OF PHOSPHORUS: CPT

## 2025-05-25 PROCEDURE — 82962 GLUCOSE BLOOD TEST: CPT

## 2025-05-25 PROCEDURE — 83735 ASSAY OF MAGNESIUM: CPT

## 2025-05-25 PROCEDURE — 6360000002 HC RX W HCPCS

## 2025-05-25 PROCEDURE — 1200000000 HC SEMI PRIVATE

## 2025-05-25 PROCEDURE — 94640 AIRWAY INHALATION TREATMENT: CPT

## 2025-05-25 PROCEDURE — 80053 COMPREHEN METABOLIC PANEL: CPT

## 2025-05-25 PROCEDURE — 6370000000 HC RX 637 (ALT 250 FOR IP): Performed by: INTERNAL MEDICINE

## 2025-05-25 PROCEDURE — 36415 COLL VENOUS BLD VENIPUNCTURE: CPT

## 2025-05-25 PROCEDURE — 99232 SBSQ HOSP IP/OBS MODERATE 35: CPT | Performed by: CHIROPRACTOR

## 2025-05-25 PROCEDURE — 2500000003 HC RX 250 WO HCPCS

## 2025-05-25 PROCEDURE — 2700000000 HC OXYGEN THERAPY PER DAY

## 2025-05-25 PROCEDURE — 94660 CPAP INITIATION&MGMT: CPT

## 2025-05-25 PROCEDURE — 85025 COMPLETE CBC W/AUTO DIFF WBC: CPT

## 2025-05-25 RX ADMIN — BUMETANIDE 2 MG: 1 TABLET ORAL at 09:32

## 2025-05-25 RX ADMIN — ATORVASTATIN CALCIUM 40 MG: 40 TABLET, FILM COATED ORAL at 09:33

## 2025-05-25 RX ADMIN — SALINE NASAL SPRAY 1 SPRAY: 1.5 SOLUTION NASAL at 22:10

## 2025-05-25 RX ADMIN — MIDODRINE HYDROCHLORIDE 20 MG: 10 TABLET ORAL at 16:54

## 2025-05-25 RX ADMIN — Medication 2000 UNITS: at 09:34

## 2025-05-25 RX ADMIN — SALINE NASAL SPRAY 1 SPRAY: 1.5 SOLUTION NASAL at 09:35

## 2025-05-25 RX ADMIN — LEVOTHYROXINE SODIUM 50 MCG: 0.05 TABLET ORAL at 05:52

## 2025-05-25 RX ADMIN — SERTRALINE 50 MG: 50 TABLET, FILM COATED ORAL at 09:33

## 2025-05-25 RX ADMIN — SALINE NASAL SPRAY 1 SPRAY: 1.5 SOLUTION NASAL at 14:15

## 2025-05-25 RX ADMIN — ASPIRIN 81 MG: 81 TABLET, COATED ORAL at 09:33

## 2025-05-25 RX ADMIN — IPRATROPIUM BROMIDE AND ALBUTEROL SULFATE 1 DOSE: 2.5; .5 SOLUTION RESPIRATORY (INHALATION) at 11:27

## 2025-05-25 RX ADMIN — APIXABAN 5 MG: 5 TABLET, FILM COATED ORAL at 22:14

## 2025-05-25 RX ADMIN — BUMETANIDE 2 MG: 1 TABLET ORAL at 16:54

## 2025-05-25 RX ADMIN — MIDODRINE HYDROCHLORIDE 20 MG: 10 TABLET ORAL at 12:35

## 2025-05-25 RX ADMIN — LEVOTHYROXINE SODIUM 100 MCG: 0.1 TABLET ORAL at 05:53

## 2025-05-25 RX ADMIN — METOLAZONE 5 MG: 5 TABLET ORAL at 09:34

## 2025-05-25 RX ADMIN — SODIUM CHLORIDE, PRESERVATIVE FREE 10 ML: 5 INJECTION INTRAVENOUS at 22:15

## 2025-05-25 RX ADMIN — IPRATROPIUM BROMIDE AND ALBUTEROL SULFATE 1 DOSE: 2.5; .5 SOLUTION RESPIRATORY (INHALATION) at 06:03

## 2025-05-25 RX ADMIN — METOPROLOL SUCCINATE 12.5 MG: 25 TABLET, EXTENDED RELEASE ORAL at 09:31

## 2025-05-25 RX ADMIN — MIDODRINE HYDROCHLORIDE 20 MG: 10 TABLET ORAL at 09:32

## 2025-05-25 RX ADMIN — APIXABAN 5 MG: 5 TABLET, FILM COATED ORAL at 09:33

## 2025-05-25 RX ADMIN — CEFTRIAXONE SODIUM 1000 MG: 1 INJECTION, POWDER, FOR SOLUTION INTRAMUSCULAR; INTRAVENOUS at 12:35

## 2025-05-25 RX ADMIN — IPRATROPIUM BROMIDE AND ALBUTEROL SULFATE 1 DOSE: 2.5; .5 SOLUTION RESPIRATORY (INHALATION) at 20:06

## 2025-05-25 RX ADMIN — ACETAMINOPHEN 650 MG: 325 TABLET ORAL at 10:56

## 2025-05-25 RX ADMIN — ACETAMINOPHEN 650 MG: 325 TABLET ORAL at 22:13

## 2025-05-25 ASSESSMENT — PAIN DESCRIPTION - LOCATION
LOCATION: COCCYX
LOCATION: BACK

## 2025-05-25 ASSESSMENT — PAIN - FUNCTIONAL ASSESSMENT: PAIN_FUNCTIONAL_ASSESSMENT: PREVENTS OR INTERFERES SOME ACTIVE ACTIVITIES AND ADLS

## 2025-05-25 ASSESSMENT — PAIN DESCRIPTION - ORIENTATION
ORIENTATION: MID
ORIENTATION: MID

## 2025-05-25 ASSESSMENT — PAIN DESCRIPTION - DESCRIPTORS
DESCRIPTORS: ACHING;CRUSHING;NAGGING
DESCRIPTORS: ACHING;THROBBING;DISCOMFORT

## 2025-05-25 ASSESSMENT — PAIN SCALES - GENERAL
PAINLEVEL_OUTOF10: 10
PAINLEVEL_OUTOF10: 10

## 2025-05-25 ASSESSMENT — PAIN SCALES - WONG BAKER: WONGBAKER_NUMERICALRESPONSE: NO HURT

## 2025-05-25 NOTE — PROGRESS NOTES
Department of Internal Medicine  Infectious Diseases  Progress  Note      C/C :   Leukocytosis       Pt is awake and alert  No distress  Afebrile         Current Facility-Administered Medications   Medication Dose Route Frequency Provider Last Rate Last Admin    cefTRIAXone (ROCEPHIN) 1,000 mg in sterile water 10 mL IV syringe  1,000 mg IntraVENous Q24H Cory Cheema MD   1,000 mg at 05/25/25 1235    metOLazone (ZAROXOLYN) tablet 5 mg  5 mg Oral Daily Manoj Chavez MD   5 mg at 05/25/25 0934    insulin glargine (LANTUS) injection vial 15 Units  15 Units SubCUTAneous Nightly Luiz Luciano MD   15 Units at 05/24/25 2102    heparin (porcine) injection 3,200 Units  3,200 Units IntraCATHeter PRN Manoj Chavez MD   3,200 Units at 05/20/25 1142    insulin lispro (HUMALOG,ADMELOG) injection vial 0-4 Units  0-4 Units SubCUTAneous 4x Daily AC & HS Luiz Luciano MD        midodrine (PROAMATINE) tablet 20 mg  20 mg Oral TID WC Benjie Mevlin MD   20 mg at 05/25/25 1235    lidocaine 4 % external patch 1 patch  1 patch TransDERmal Daily Benjie Melvin MD   1 patch at 05/25/25 0932    dextrose bolus 10% 125 mL  125 mL IntraVENous PRN Niki Hirsch MD        Or    dextrose bolus 10% 250 mL  250 mL IntraVENous PRN Niki Hirsch MD        magnesium sulfate 2000 mg in 50 mL IVPB premix  2,000 mg IntraVENous PRN Niki Hirsch MD        sodium phosphate 15 mmol in sodium chloride 0.9 % 250 mL IVPB  15 mmol IntraVENous PRN Niki Hirsch MD        dextrose 10 % infusion   IntraVENous Continuous PRN Niki Hirsch MD        [Held by provider] lactulose (CHRONULAC) 10 GM/15ML solution 20 g  20 g Oral TID Elijah Rios MD        [Held by provider] rifAXIMin (XIFAXAN) tablet 400 mg  400 mg Oral TID Elijah Rios MD        0.9 % sodium chloride infusion   IntraVENous PRN Lizzy Barbosa MD        ipratropium 0.5 mg-albuterol 2.5 mg (DUONEB) nebulizer solution 1 Dose  1 Dose Inhalation Q4H

## 2025-05-25 NOTE — PROGRESS NOTES
[facility] [place]  Internal Medicine Residency Program  Progress Note - House Team 2    Patient:  Wander Rocha 69 y.o. male MRN: [id number]     Date of Service: 5/25/2025     CC: shortness of breath    Subjective     Patient was seen and examined at bedside this morning.  No acute overnight complaints.  Patient has been saturating fine on nasal cannula, 4 L.  Awaiting for placement.      Objective     VS: BP (!) 109/55   Pulse 60   Temp 97 °F (36.1 °C) (Temporal)   Resp 19   Ht 1.702 m (5' 7\")   Wt 91.1 kg (200 lb 13.4 oz)   SpO2 98%   BMI 31.46 kg/m²   ABP (Arterial line BP): (!) 68/57  ABP Mean (Arterial Line Mean): (!) 64 mmHg  I & O - 24hr:   Intake/Output Summary (Last 24 hours) at 5/25/2025 1411  Last data filed at 5/24/2025 1800  Gross per 24 hour   Intake 300 ml   Output 3150 ml   Net -2850 ml     Net IO Since Admission: -8,609.95 mL [05/25/25 1411]    Physical Exam:  General Appearance: Alert and oriented, not in distress.  HEENT: Normocephalic, atraumatic, (+) tunneled HD line, right neck   Neck: midline trachea  Lung: decreased breath sounds L>R  Heart: regular rate and rhythm, no murmur  Abdomen: soft, (+) distended abdomen   Extremities: no cyanosis or edema  Musculokeletal: No joint swelling  Neurologic: Mental status: awake, alert, oriented, and thought content appropriate     Interventions:  Procedures:   Chest tube insertion, right lung (05/18/2025)    Airway: 5 L/min via HFNC     Medications     Continuous Infusions:   dextrose      sodium chloride      sodium chloride      dextrose      sodium chloride       Scheduled Meds:   cefTRIAXone (ROCEPHIN) IV  1,000 mg IntraVENous Q24H    metOLazone  5 mg Oral Daily    insulin glargine  15 Units SubCUTAneous Nightly    insulin lispro  0-4 Units SubCUTAneous 4x Daily AC & HS    midodrine  20 mg Oral TID WC    lidocaine  1 patch TransDERmal Daily    [Held by provider] lactulose  20 g Oral TID    [Held by provider] rifAXIMin  400 mg

## 2025-05-25 NOTE — PLAN OF CARE
Problem: Safety - Adult  Goal: Free from fall injury  5/25/2025 0345 by Rebecca Mcguire RN  Outcome: Progressing  5/24/2025 1850 by Kelsey Christensen RN  Outcome: Progressing     Problem: Chronic Conditions and Co-morbidities  Goal: Patient's chronic conditions and co-morbidity symptoms are monitored and maintained or improved  5/25/2025 0345 by Rebecca Mcguire RN  Outcome: Progressing  5/24/2025 1850 by Kelsey Christensen RN  Outcome: Progressing     Problem: Discharge Planning  Goal: Discharge to home or other facility with appropriate resources  5/25/2025 0345 by Rebecca Mcguire RN  Outcome: Progressing  5/24/2025 1850 by Kelsey Christensen RN  Outcome: Progressing     Problem: Skin/Tissue Integrity  Goal: Skin integrity remains intact  Description: 1.  Monitor for areas of redness and/or skin breakdown2.  Assess vascular access sites hourly3.  Every 4-6 hours minimum:  Change oxygen saturation probe site4.  Every 4-6 hours:  If on nasal continuous positive airway pressure, respiratory therapy assess nares and determine need for appliance change or resting period  5/24/2025 1850 by Kelsey Christensen RN  Outcome: Progressing     Problem: ABCDS Injury Assessment  Goal: Absence of physical injury  5/24/2025 1850 by Kelsey Christensen RN  Outcome: Progressing     Problem: Pain  Goal: Verbalizes/displays adequate comfort level or baseline comfort level  5/24/2025 1850 by Kelsey Christensen RN  Outcome: Progressing     Problem: Neurosensory - Adult  Goal: Achieves stable or improved neurological status  5/24/2025 1850 by Kelsey Christensen RN  Outcome: Progressing     Problem: Respiratory - Adult  Goal: Achieves optimal ventilation and oxygenation  5/24/2025 1850 by Kelsey Christensen RN  Outcome: Progressing     Problem: Cardiovascular - Adult  Goal: Maintains optimal cardiac output and hemodynamic stability  5/24/2025 1850 by Kelsey Christensen RN  Outcome: Progressing     Problem: Nutrition Deficit:  Goal: Optimize nutritional  POST-OP DIAGNOSIS:  Ventilator dependent 28-Apr-2020 13:51:43  CARLOTA LOPEZ  COVID-19 28-Apr-2020 13:51:37  CARLOTA LOPEZ

## 2025-05-25 NOTE — PROGRESS NOTES
ENDOCRINOLOGY PROGRESS NOTE      Date of admission: 5/14/2025  Date of service: 5/25/2025  Admitting physician: Martell Rosenberg MD   Primary Care Physician: Oly Crespo MD  Consultant physician: Luiz Luciano MD     Reason for the consultation:  Uncontrolled DM    History of Present Illness:  The history is provided by the patient. Accuracy of the patient data is excellent    Wander Rocha is a very pleasant 69 y.o. old male with PMH of poorly controlled type 2 diabetes, CKD, A-fib on oral anticoagulation, primary hypothyroidism, dyslipidemia and other listed below admitted to [facility] on 5/14/2025 because of RADHA, endocrine service was consulted for diabetes management.     Subjective  Patient was seen at bedside this morning, no acute events overnight overnight, glucose level in range most of the time.  No hypoglycemia    Point of care glucose monitoring   (Independently reviewed)   Recent Labs     05/23/25  1110 05/23/25  1551 05/23/25  2000 05/24/25  0606 05/24/25  1118 05/24/25  1720 05/24/25  2109 05/25/25  0550   POCGLU 161* 140* 139* 110* 170* 123* 142* 125*      Allergy and drug reactions:   No Known Allergies    Scheduled Meds:   cefTRIAXone (ROCEPHIN) IV  1,000 mg IntraVENous Q24H    metOLazone  5 mg Oral Daily    insulin glargine  15 Units SubCUTAneous Nightly    insulin lispro  0-4 Units SubCUTAneous 4x Daily AC & HS    midodrine  20 mg Oral TID WC    lidocaine  1 patch TransDERmal Daily    [Held by provider] lactulose  20 g Oral TID    [Held by provider] rifAXIMin  400 mg Oral TID    ipratropium 0.5 mg-albuterol 2.5 mg  1 Dose Inhalation Q4H WA RT    [Held by provider] sevelamer  0.8 g Oral TID WC    sodium chloride  1 spray Each Nostril TID    metoprolol succinate  12.5 mg Oral Daily    epoetin lay-epbx  3,000 Units SubCUTAneous Once per day on Tuesday Thursday Saturday    sodium chloride flush  5-40 mL IntraVENous 2 times per day    [Held by provider] allopurinol  200 mg Oral Daily    apixaban  5

## 2025-05-25 NOTE — PLAN OF CARE
Problem: Safety - Adult  Goal: Free from fall injury  5/25/2025 1537 by Georgie Mccarty RN  Outcome: Progressing  5/25/2025 0345 by Rebecca Mcguire RN  Outcome: Progressing     Problem: Chronic Conditions and Co-morbidities  Goal: Patient's chronic conditions and co-morbidity symptoms are monitored and maintained or improved  5/25/2025 1537 by Georgie Mccarty RN  Outcome: Progressing  5/25/2025 0345 by Rebecca Mcguire RN  Outcome: Progressing     Problem: Discharge Planning  Goal: Discharge to home or other facility with appropriate resources  5/25/2025 1537 by Georgie Mccarty RN  Outcome: Progressing  5/25/2025 0345 by Rebecca Mcguire RN  Outcome: Progressing     Problem: Skin/Tissue Integrity  Goal: Skin integrity remains intact  Description: 1.  Monitor for areas of redness and/or skin breakdown2.  Assess vascular access sites hourly3.  Every 4-6 hours minimum:  Change oxygen saturation probe site4.  Every 4-6 hours:  If on nasal continuous positive airway pressure, respiratory therapy assess nares and determine need for appliance change or resting period  Outcome: Progressing     Problem: ABCDS Injury Assessment  Goal: Absence of physical injury  Outcome: Progressing     Problem: Neurosensory - Adult  Goal: Achieves stable or improved neurological status  Outcome: Progressing     Problem: Pain  Goal: Verbalizes/displays adequate comfort level or baseline comfort level  Outcome: Progressing

## 2025-05-25 NOTE — PROGRESS NOTES
Department of Internal Medicine  Nephrology Progress Note      Events reviewed.    SUBJECTIVE: We are following Mr. Wander Rocha for CKD.  Reports no new complaints.    PHYSICAL EXAM:      Vitals:    VITALS:  BP (!) 119/51   Pulse 60   Temp 97.5 °F (36.4 °C) (Oral)   Resp 18   Ht 1.702 m (5' 7\")   Wt 91.1 kg (200 lb 13.4 oz)   SpO2 100%   BMI 31.46 kg/m²   24HR INTAKE/OUTPUT:    Intake/Output Summary (Last 24 hours) at 5/25/2025 0737  Last data filed at 5/24/2025 1800  Gross per 24 hour   Intake 780 ml   Output 3150 ml   Net -2370 ml       Access: Right IJ vein TDC catheter in place  Constitutional:  Awake, alert, oriented, in NAD  HEENT:  PERRLA, normocephalic, atraumatic  Respiratory:  wheezes  Cardiovascular/Edema:  RRR, normal S1, normal S2, + edema  Gastrointestinal: Distended  Neurologic:  Nonfocal  Skin:  warm, dry, no rashes, no lesions    Scheduled Meds:   cefTRIAXone (ROCEPHIN) IV  1,000 mg IntraVENous Q24H    metOLazone  5 mg Oral Daily    insulin glargine  15 Units SubCUTAneous Nightly    insulin lispro  0-4 Units SubCUTAneous 4x Daily AC & HS    midodrine  20 mg Oral TID WC    lidocaine  1 patch TransDERmal Daily    [Held by provider] lactulose  20 g Oral TID    [Held by provider] rifAXIMin  400 mg Oral TID    ipratropium 0.5 mg-albuterol 2.5 mg  1 Dose Inhalation Q4H WA RT    [Held by provider] sevelamer  0.8 g Oral TID WC    sodium chloride  1 spray Each Nostril TID    metoprolol succinate  12.5 mg Oral Daily    epoetin lay-epbx  3,000 Units SubCUTAneous Once per day on Tuesday Thursday Saturday    sodium chloride flush  5-40 mL IntraVENous 2 times per day    [Held by provider] allopurinol  200 mg Oral Daily    apixaban  5 mg Oral BID    aspirin  81 mg Oral Daily    atorvastatin  40 mg Oral Daily    bumetanide  2 mg Oral BID    vitamin D  2,000 Units Oral Daily    levothyroxine  100 mcg Oral Daily    levothyroxine  50 mcg Oral Daily    sertraline  50 mg Oral Daily    [Held by provider]  tamsulosin  0.4 mg Oral Daily     Continuous Infusions:   dextrose      sodium chloride      sodium chloride      dextrose      sodium chloride       PRN Meds:.HYDROcodone 5 mg - acetaminophen, heparin (porcine), dextrose bolus **OR** dextrose bolus, magnesium sulfate, sodium phosphate 15 mmol in sodium chloride 0.9 % 250 mL IVPB, dextrose, HYDROmorphone, sodium chloride, sodium chloride flush, sodium chloride, glucose, dextrose bolus **OR** dextrose bolus, glucagon (rDNA), dextrose, sodium chloride flush, sodium chloride, ondansetron **OR** ondansetron, polyethylene glycol, acetaminophen **OR** acetaminophen      DATA:    CBC:   Lab Results   Component Value Date/Time    WBC 13.6 05/25/2025 04:48 AM    RBC 2.64 05/25/2025 04:48 AM    HGB 7.7 05/25/2025 04:48 AM    HCT 26.4 05/25/2025 04:48 AM    .0 05/25/2025 04:48 AM    MCH 29.2 05/25/2025 04:48 AM    MCHC 29.2 05/25/2025 04:48 AM    RDW 20.2 05/25/2025 04:48 AM     05/25/2025 04:48 AM    MPV 10.0 05/25/2025 04:48 AM     CMP:    Lab Results   Component Value Date/Time     05/25/2025 04:48 AM    K 3.9 05/25/2025 04:48 AM    K 5.1 01/08/2023 05:35 AM    CL 99 05/25/2025 04:48 AM    CO2 25 05/25/2025 04:48 AM    BUN 22 05/25/2025 04:48 AM    CREATININE 2.0 05/25/2025 04:48 AM    GFRAA >60 10/03/2022 10:55 AM    LABGLOM 36 05/25/2025 04:48 AM    LABGLOM 60 04/19/2024 10:45 AM    GLUCOSE 127 05/25/2025 04:48 AM    GLUCOSE 133 04/18/2012 08:43 AM    CALCIUM 8.5 05/25/2025 04:48 AM    BILITOT 0.7 05/25/2025 04:48 AM    ALKPHOS 77 05/25/2025 04:48 AM    AST 31 05/25/2025 04:48 AM    ALT 28 05/25/2025 04:48 AM     Magnesium:    Lab Results   Component Value Date/Time    MG 1.9 05/25/2025 04:48 AM     Phosphorus:    Lab Results   Component Value Date/Time    PHOS 5.0 05/25/2025 04:48 AM     Radiology Review:          US RETROPERITONEAL COMPLETE 5/15/25    IMPRESSION:  1. Mildly increased echogenicity of the renal cortices most suggestive of  intrinsic

## 2025-05-26 PROBLEM — B96.89 UTI DUE TO KLEBSIELLA SPECIES: Status: ACTIVE | Noted: 2025-05-26

## 2025-05-26 PROBLEM — N18.6 ESRD (END STAGE RENAL DISEASE) (HCC): Status: ACTIVE | Noted: 2025-05-26

## 2025-05-26 PROBLEM — N39.0 UTI DUE TO KLEBSIELLA SPECIES: Status: ACTIVE | Noted: 2025-05-26

## 2025-05-26 PROBLEM — N18.5 CKD (CHRONIC KIDNEY DISEASE) STAGE 5, GFR LESS THAN 15 ML/MIN (HCC): Status: ACTIVE | Noted: 2025-05-26

## 2025-05-26 LAB
ALBUMIN SERPL-MCNC: 3.7 G/DL (ref 3.5–5.2)
ALP SERPL-CCNC: 80 U/L (ref 40–129)
ALT SERPL-CCNC: 30 U/L (ref 0–50)
ANION GAP SERPL CALCULATED.3IONS-SCNC: 11 MMOL/L (ref 7–16)
AST SERPL-CCNC: 31 U/L (ref 0–50)
BASOPHILS # BLD: 0 K/UL (ref 0–0.2)
BASOPHILS NFR BLD: 0 % (ref 0–2)
BILIRUB SERPL-MCNC: 0.6 MG/DL (ref 0–1.2)
BUN SERPL-MCNC: 29 MG/DL (ref 8–23)
CALCIUM SERPL-MCNC: 8.8 MG/DL (ref 8.8–10.2)
CHLORIDE SERPL-SCNC: 97 MMOL/L (ref 98–107)
CO2 SERPL-SCNC: 26 MMOL/L (ref 22–29)
CREAT SERPL-MCNC: 2.1 MG/DL (ref 0.7–1.2)
EOSINOPHIL # BLD: 0.43 K/UL (ref 0.05–0.5)
EOSINOPHILS RELATIVE PERCENT: 4 % (ref 0–6)
ERYTHROCYTE [DISTWIDTH] IN BLOOD BY AUTOMATED COUNT: 19.9 % (ref 11.5–15)
GFR, ESTIMATED: 33 ML/MIN/1.73M2
GLUCOSE BLD-MCNC: 111 MG/DL (ref 74–99)
GLUCOSE BLD-MCNC: 121 MG/DL (ref 74–99)
GLUCOSE BLD-MCNC: 126 MG/DL (ref 74–99)
GLUCOSE BLD-MCNC: 204 MG/DL (ref 74–99)
GLUCOSE SERPL-MCNC: 111 MG/DL (ref 74–99)
HCT VFR BLD AUTO: 26.3 % (ref 37–54)
HGB BLD-MCNC: 7.9 G/DL (ref 12.5–16.5)
LYMPHOCYTES NFR BLD: 1.19 K/UL (ref 1.5–4)
LYMPHOCYTES RELATIVE PERCENT: 10 % (ref 20–42)
MAGNESIUM SERPL-MCNC: 1.7 MG/DL (ref 1.6–2.4)
MCH RBC QN AUTO: 29.8 PG (ref 26–35)
MCHC RBC AUTO-ENTMCNC: 30 G/DL (ref 32–34.5)
MCV RBC AUTO: 99.2 FL (ref 80–99.9)
MONOCYTES NFR BLD: 0.86 K/UL (ref 0.1–0.95)
MONOCYTES NFR BLD: 7 % (ref 2–12)
NEUTROPHILS NFR BLD: 80 % (ref 43–80)
NEUTS SEG NFR BLD: 9.92 K/UL (ref 1.8–7.3)
NUCLEATED RED BLOOD CELLS: 1 PER 100 WBC
PHOSPHATE SERPL-MCNC: 5.4 MG/DL (ref 2.5–4.5)
PLATELET # BLD AUTO: 134 K/UL (ref 130–450)
PMV BLD AUTO: 10.1 FL (ref 7–12)
POTASSIUM SERPL-SCNC: 3.7 MMOL/L (ref 3.5–5.1)
PROT SERPL-MCNC: 6.5 G/DL (ref 6.4–8.3)
RBC # BLD AUTO: 2.65 M/UL (ref 3.8–5.8)
RBC # BLD: ABNORMAL 10*6/UL
SODIUM SERPL-SCNC: 135 MMOL/L (ref 136–145)
WBC OTHER # BLD: 12.4 K/UL (ref 4.5–11.5)

## 2025-05-26 PROCEDURE — 83735 ASSAY OF MAGNESIUM: CPT

## 2025-05-26 PROCEDURE — 82962 GLUCOSE BLOOD TEST: CPT

## 2025-05-26 PROCEDURE — 36415 COLL VENOUS BLD VENIPUNCTURE: CPT

## 2025-05-26 PROCEDURE — 6370000000 HC RX 637 (ALT 250 FOR IP)

## 2025-05-26 PROCEDURE — 99232 SBSQ HOSP IP/OBS MODERATE 35: CPT | Performed by: INTERNAL MEDICINE

## 2025-05-26 PROCEDURE — 94640 AIRWAY INHALATION TREATMENT: CPT

## 2025-05-26 PROCEDURE — 1200000000 HC SEMI PRIVATE

## 2025-05-26 PROCEDURE — 84100 ASSAY OF PHOSPHORUS: CPT

## 2025-05-26 PROCEDURE — 2500000003 HC RX 250 WO HCPCS

## 2025-05-26 PROCEDURE — 6370000000 HC RX 637 (ALT 250 FOR IP): Performed by: INTERNAL MEDICINE

## 2025-05-26 PROCEDURE — 80053 COMPREHEN METABOLIC PANEL: CPT

## 2025-05-26 PROCEDURE — 2700000000 HC OXYGEN THERAPY PER DAY

## 2025-05-26 PROCEDURE — 6360000002 HC RX W HCPCS

## 2025-05-26 PROCEDURE — 94660 CPAP INITIATION&MGMT: CPT

## 2025-05-26 PROCEDURE — 85025 COMPLETE CBC W/AUTO DIFF WBC: CPT

## 2025-05-26 RX ORDER — TRAMADOL HYDROCHLORIDE 50 MG/1
25 TABLET ORAL ONCE
Status: COMPLETED | OUTPATIENT
Start: 2025-05-26 | End: 2025-05-26

## 2025-05-26 RX ORDER — INSULIN LISPRO 100 [IU]/ML
0-6 INJECTION, SOLUTION INTRAVENOUS; SUBCUTANEOUS
Status: DISCONTINUED | OUTPATIENT
Start: 2025-05-26 | End: 2025-05-29 | Stop reason: HOSPADM

## 2025-05-26 RX ADMIN — ATORVASTATIN CALCIUM 40 MG: 40 TABLET, FILM COATED ORAL at 10:18

## 2025-05-26 RX ADMIN — LEVOTHYROXINE SODIUM 100 MCG: 0.1 TABLET ORAL at 06:17

## 2025-05-26 RX ADMIN — BUMETANIDE 2 MG: 1 TABLET ORAL at 10:18

## 2025-05-26 RX ADMIN — IPRATROPIUM BROMIDE AND ALBUTEROL SULFATE 1 DOSE: 2.5; .5 SOLUTION RESPIRATORY (INHALATION) at 10:25

## 2025-05-26 RX ADMIN — ASPIRIN 81 MG: 81 TABLET, COATED ORAL at 10:19

## 2025-05-26 RX ADMIN — APIXABAN 5 MG: 5 TABLET, FILM COATED ORAL at 10:19

## 2025-05-26 RX ADMIN — TRAMADOL HYDROCHLORIDE 25 MG: 50 TABLET, COATED ORAL at 00:52

## 2025-05-26 RX ADMIN — SALINE NASAL SPRAY 1 SPRAY: 1.5 SOLUTION NASAL at 20:59

## 2025-05-26 RX ADMIN — IPRATROPIUM BROMIDE AND ALBUTEROL SULFATE 1 DOSE: 2.5; .5 SOLUTION RESPIRATORY (INHALATION) at 16:21

## 2025-05-26 RX ADMIN — METOLAZONE 5 MG: 5 TABLET ORAL at 10:19

## 2025-05-26 RX ADMIN — METOPROLOL SUCCINATE 12.5 MG: 25 TABLET, EXTENDED RELEASE ORAL at 10:19

## 2025-05-26 RX ADMIN — MIDODRINE HYDROCHLORIDE 20 MG: 10 TABLET ORAL at 13:33

## 2025-05-26 RX ADMIN — ACETAMINOPHEN 650 MG: 325 TABLET ORAL at 23:50

## 2025-05-26 RX ADMIN — INSULIN LISPRO 2 UNITS: 100 INJECTION, SOLUTION INTRAVENOUS; SUBCUTANEOUS at 11:57

## 2025-05-26 RX ADMIN — SODIUM CHLORIDE, PRESERVATIVE FREE 10 ML: 5 INJECTION INTRAVENOUS at 20:59

## 2025-05-26 RX ADMIN — IPRATROPIUM BROMIDE AND ALBUTEROL SULFATE 1 DOSE: 2.5; .5 SOLUTION RESPIRATORY (INHALATION) at 20:07

## 2025-05-26 RX ADMIN — Medication 2000 UNITS: at 10:19

## 2025-05-26 RX ADMIN — BUMETANIDE 2 MG: 1 TABLET ORAL at 16:55

## 2025-05-26 RX ADMIN — CEFTRIAXONE SODIUM 1000 MG: 1 INJECTION, POWDER, FOR SOLUTION INTRAMUSCULAR; INTRAVENOUS at 11:57

## 2025-05-26 RX ADMIN — MIDODRINE HYDROCHLORIDE 20 MG: 10 TABLET ORAL at 16:55

## 2025-05-26 RX ADMIN — SERTRALINE 50 MG: 50 TABLET, FILM COATED ORAL at 10:19

## 2025-05-26 RX ADMIN — LEVOTHYROXINE SODIUM 50 MCG: 0.05 TABLET ORAL at 06:17

## 2025-05-26 RX ADMIN — MIDODRINE HYDROCHLORIDE 20 MG: 10 TABLET ORAL at 10:18

## 2025-05-26 ASSESSMENT — PAIN SCALES - GENERAL
PAINLEVEL_OUTOF10: 10
PAINLEVEL_OUTOF10: 5
PAINLEVEL_OUTOF10: 10

## 2025-05-26 ASSESSMENT — PAIN DESCRIPTION - ORIENTATION
ORIENTATION: MID
ORIENTATION: MID;LOWER

## 2025-05-26 ASSESSMENT — ENCOUNTER SYMPTOMS
CHOKING: 0
COUGH: 0
SHORTNESS OF BREATH: 0

## 2025-05-26 ASSESSMENT — PAIN DESCRIPTION - DESCRIPTORS
DESCRIPTORS: ACHING;DISCOMFORT;DULL
DESCRIPTORS: ACHING;DISCOMFORT;SORE

## 2025-05-26 ASSESSMENT — PAIN - FUNCTIONAL ASSESSMENT: PAIN_FUNCTIONAL_ASSESSMENT: PREVENTS OR INTERFERES SOME ACTIVE ACTIVITIES AND ADLS

## 2025-05-26 ASSESSMENT — PAIN DESCRIPTION - LOCATION
LOCATION: BACK
LOCATION: BACK

## 2025-05-26 ASSESSMENT — PAIN DESCRIPTION - PAIN TYPE: TYPE: CHRONIC PAIN

## 2025-05-26 ASSESSMENT — PAIN DESCRIPTION - FREQUENCY: FREQUENCY: CONTINUOUS

## 2025-05-26 ASSESSMENT — PAIN DESCRIPTION - ONSET: ONSET: ON-GOING

## 2025-05-26 NOTE — PROGRESS NOTES
(Abnormal)  (Susceptibility) Collected: 05/19/25 1112    Order Status: Completed Specimen: Urine Updated: 05/22/25 0707     Specimen Description .URINE     Culture KLEBSIELLA PNEUMONIAE 10 to 50,000 CFU/ML Identification by MALDI-TOF    Susceptibility        Klebsiella pneumoniae      BACTERIAL SUSCEPTIBILITY PANEL ADAN      amikacin <=1  Sensitive      ampicillin >=32  Resistant      ampicillin-sulbactam 16  Intermediate      aztreonam <=1  Sensitive      ceFAZolin 2  Intermediate  [1]       cefepime <=0.12  Sensitive      cefotaxime <=0.25  Sensitive      cefOXitin 32  Resistant      cefTAZidime <=0.5  Sensitive      cefTAZidime-avibactam 0.5  Sensitive      Ceftolozane/Tazobactam (Zerbaxa) 1  Sensitive      cefTRIAXone <=0.25  Sensitive      Cefuroxime axetil 32  Resistant      Cefuroxime-Sodium 32  Resistant      ciprofloxacin 0.5  Intermediate      ertapenem <=0.12  Sensitive      imipenem <=0.25  Sensitive      Imipenem-Relebactam <=0.25  Sensitive      levofloxacin 1  Intermediate      meropenem <=0.25  Sensitive      Meropenem-vaborbactam <=0.5  Sensitive      nitrofurantoin 256  Resistant      piperacillin-tazobactam 16  Intermediate      tobramycin <=1  Sensitive      trimethoprim-sulfamethoxazole <=20  Sensitive                   [1]  Cefazolin sensitivity results can be used to predict the effectiveness of oral   cephalosporins (eg. Cephalexin) in uncomplicated Urinary Tract Infections due to E. coli, K.   pneumoniae, and P. mirabilis                      Gram stain [[id number]] Collected: 05/18/25 1401    Order Status: Canceled Specimen: Body Fluid     Culture, Body Fluid (with Gram Stain) [[id number]] Collected: 05/18/25 1401    Order Status: Completed Specimen: Body Fluid from Pleural Fluid Updated: 05/20/25 1107     Specimen Description .PLEURAL FLUID     Special Requests Site: Body Fluid     Direct Exam Gram stain performed on unspun fluid.      RARE Polymorphonuclear leukocytes      NO EPITHELIAL  experienced epistaxis, ENT placed dissolvable packing and recommended switching to a facemask with humidified oxygen. Over time, he developed worsening dyspnea and was escalated from 15 L non-rebreather to BIPAP due to increased work of breathing. Imaging showed pulmonary venous congestion and bilateral pleural effusion. He was started on Bumex drip and transferred to MICU for closer monitoring. He was placed on vancomycin and Zosyn for suspected infection, initiated on pressors for hypotension, and had an HD line placed for further volume management. Eventually, he had a tunneled HD line placed. He also had chest tube inserted to drain fluids from right lung. On further workup, he was found to have DKA. DKA protocol was started, and was bridged successfully to subcutaneous insulin. He had an episode of confusion. Urine culture growing Klebsiella pneumoniae. CT head and metabolic workup were unremarkable. Zosyn was continued. He has been stable off pressors, thus, he was transferred to the floor for further monitoring. He also had oropharyngeal dysphagia, in which SLP was consulted and recommended soft and bite sized consistency solids and thin liquids.    Assessment    Acute hypoxic respiratory failure likely secondary to fluid overload s/p chest tube insertion, right lung (05/18/2025)  Acute decompensated heart failure  Heart failure with improved ejection fraction, LVEF 50-55% (04/29/2025)   Shock likely septic, resolved  Persistent atrial fibrillation, [id number] Score: 4  Sinus node disorder s/p dual chamber pacemaker insertion (04/23/2025) s/p CRT P (04/23/2025)  Elevated troponin likely secondary to demand ischemia, RADHA  Hypertension  Hyperlipidemia  Mild oropharyngeal dysphagia  Acute kidney injury stage II on Chronic kidney disease IIIb, baseline crea 2.0, improving  Crea today 2.9 mg/dL  High anion gap metabolic acidosis likely secondary to uremia, RADHA, DKA  Euglycemic diabetic ketoacidosis likely

## 2025-05-26 NOTE — PROGRESS NOTES
ENDOCRINOLOGY PROGRESS NOTE      Date of admission: 5/14/2025  Date of service: 5/26/2025  Admitting physician: Martell Rosenberg MD   Primary Care Physician: Oly Crespo MD  Consultant physician: Luiz Luciano MD     Reason for the consultation:  Uncontrolled DM    History of Present Illness:  The history is provided by the patient. Accuracy of the patient data is excellent    Wander Rocha is a very pleasant 69 y.o. old male with PMH of poorly controlled type 2 diabetes, CKD, A-fib on oral anticoagulation, primary hypothyroidism, dyslipidemia and other listed below admitted to [facility] on 5/14/2025 because of RADHA, endocrine service was consulted for diabetes management.     Subjective  I saw and examined the patient this morning, no acute events overnight, glucose level in range.  Require less insulin    Point of care glucose monitoring   (Independently reviewed)   Recent Labs     05/24/25  1118 05/24/25  1720 05/24/25  2109 05/25/25  0550 05/25/25  1142 05/25/25  1617 05/25/25  2209 05/26/25  0539   POCGLU 170* 123* 142* 125* 179* 117* 143* 111*      Allergy and drug reactions:   No Known Allergies    Scheduled Meds:   insulin lispro  0-6 Units SubCUTAneous 4x Daily AC & HS    cefTRIAXone (ROCEPHIN) IV  1,000 mg IntraVENous Q24H    metOLazone  5 mg Oral Daily    midodrine  20 mg Oral TID WC    lidocaine  1 patch TransDERmal Daily    [Held by provider] lactulose  20 g Oral TID    [Held by provider] rifAXIMin  400 mg Oral TID    ipratropium 0.5 mg-albuterol 2.5 mg  1 Dose Inhalation Q4H WA RT    [Held by provider] sevelamer  0.8 g Oral TID WC    sodium chloride  1 spray Each Nostril TID    metoprolol succinate  12.5 mg Oral Daily    epoetin lay-epbx  3,000 Units SubCUTAneous Once per day on Tuesday Thursday Saturday    sodium chloride flush  5-40 mL IntraVENous 2 times per day    [Held by provider] allopurinol  200 mg Oral Daily    apixaban  5 mg Oral BID    aspirin  81 mg Oral Daily    atorvastatin  40 mg Oral  care of this patient. Please do not hesitate to contact us with any additional questions.     Luiz Luciano MD  Endocrinologist, [facility]   [facility]  [facility]  [address] [address]  Dept: [phone]  Loc: [phone]   Phone: [phone]  Fax: [phone]  --------------------------------------------  An electronic signature was used to authenticate this note. Luiz Luciano MD on 5/26/2025 at 7:47 AM

## 2025-05-26 NOTE — PROGRESS NOTES
Department of Internal Medicine  Nephrology Progress Note      Events reviewed.    SUBJECTIVE: We are following Mr. Wander Rocha for CKD.  Reports no new complaints.    PHYSICAL EXAM:      Vitals:    VITALS:  /63   Pulse 61   Temp 97.5 °F (36.4 °C) (Temporal)   Resp 18   Ht 1.702 m (5' 7\")   Wt 91 kg (200 lb 9.9 oz)   SpO2 99%   BMI 31.42 kg/m²   24HR INTAKE/OUTPUT:    Intake/Output Summary (Last 24 hours) at 5/26/2025 1236  Last data filed at 5/26/2025 0551  Gross per 24 hour   Intake 360 ml   Output 2300 ml   Net -1940 ml       Access: Right IJ vein TDC catheter in place  Constitutional:  Awake, alert, oriented, in NAD  HEENT:  PERRLA, normocephalic, atraumatic  Respiratory:  wheezes  Cardiovascular/Edema:  RRR, normal S1, normal S2, + edema  Gastrointestinal: Distended  Neurologic:  Nonfocal  Skin:  warm, dry, no rashes, no lesions    Scheduled Meds:   insulin lispro  0-6 Units SubCUTAneous 4x Daily AC & HS    [START ON 5/27/2025] levothyroxine  150 mcg Oral Daily    cefTRIAXone (ROCEPHIN) IV  1,000 mg IntraVENous Q24H    metOLazone  5 mg Oral Daily    midodrine  20 mg Oral TID WC    lidocaine  1 patch TransDERmal Daily    [Held by provider] lactulose  20 g Oral TID    [Held by provider] rifAXIMin  400 mg Oral TID    ipratropium 0.5 mg-albuterol 2.5 mg  1 Dose Inhalation Q4H WA RT    [Held by provider] sevelamer  0.8 g Oral TID WC    sodium chloride  1 spray Each Nostril TID    metoprolol succinate  12.5 mg Oral Daily    epoetin lay-epbx  3,000 Units SubCUTAneous Once per day on Tuesday Thursday Saturday    sodium chloride flush  5-40 mL IntraVENous 2 times per day    [Held by provider] allopurinol  200 mg Oral Daily    apixaban  5 mg Oral BID    aspirin  81 mg Oral Daily    atorvastatin  40 mg Oral Daily    bumetanide  2 mg Oral BID    vitamin D  2,000 Units Oral Daily    sertraline  50 mg Oral Daily    [Held by provider] tamsulosin  0.4 mg Oral Daily     Continuous Infusions:   dextrose      sodium

## 2025-05-26 NOTE — PROGRESS NOTES
Department of Internal Medicine  Infectious Diseases  Progress  Note      C/C :   Leukocytosis , Klebsiella bacteriuria       Pt is awake   No distress  Afebrile         Current Facility-Administered Medications   Medication Dose Route Frequency Provider Last Rate Last Admin    insulin lispro (HUMALOG,ADMELOG) injection vial 0-6 Units  0-6 Units SubCUTAneous 4x Daily AC & HS Luiz Luciano MD   2 Units at 05/26/25 1157    [START ON 5/27/2025] levothyroxine (SYNTHROID) tablet 150 mcg  150 mcg Oral Daily Luiz Luciano MD        cefTRIAXone (ROCEPHIN) 1,000 mg in sterile water 10 mL IV syringe  1,000 mg IntraVENous Q24H TauCory sanon MD   1,000 mg at 05/26/25 1157    metOLazone (ZAROXOLYN) tablet 5 mg  5 mg Oral Daily Manoj Chavez MD   5 mg at 05/26/25 1019    heparin (porcine) injection 3,200 Units  3,200 Units IntraCATHeter PRN Manoj Chavez MD   3,200 Units at 05/20/25 1142    midodrine (PROAMATINE) tablet 20 mg  20 mg Oral TID WC Benjie Melvin MD   20 mg at 05/26/25 1333    lidocaine 4 % external patch 1 patch  1 patch TransDERmal Daily Benjie Melvin MD   1 patch at 05/25/25 0932    dextrose bolus 10% 125 mL  125 mL IntraVENous PRN Niki Hirsch MD        Or    dextrose bolus 10% 250 mL  250 mL IntraVENous PRN Niki Hirsch MD        magnesium sulfate 2000 mg in 50 mL IVPB premix  2,000 mg IntraVENous PRN Niki Hirsch MD        sodium phosphate 15 mmol in sodium chloride 0.9 % 250 mL IVPB  15 mmol IntraVENous PRN Niki Hirsch MD        dextrose 10 % infusion   IntraVENous Continuous PRN Niki Hirsch MD        [Held by provider] lactulose (CHRONULAC) 10 GM/15ML solution 20 g  20 g Oral TID Elijah Rios MD        [Held by provider] rifAXIMin (XIFAXAN) tablet 400 mg  400 mg Oral TID Elijah Rios MD        0.9 % sodium chloride infusion   IntraVENous PRN Lizzy Barbosa MD        ipratropium 0.5 mg-albuterol 2.5 mg (DUONEB) nebulizer solution 1 Dose  1 Dose

## 2025-05-26 NOTE — PLAN OF CARE
Problem: Safety - Adult  Goal: Free from fall injury  5/25/2025 2248 by Toyin Avendano RN  Outcome: Progressing  5/25/2025 1537 by Georgie Mccarty RN  Outcome: Progressing     Problem: Chronic Conditions and Co-morbidities  Goal: Patient's chronic conditions and co-morbidity symptoms are monitored and maintained or improved  5/25/2025 2248 by Toyin Avendano RN  Outcome: Progressing  5/25/2025 1537 by Georgie Mccarty RN  Outcome: Progressing     Problem: Discharge Planning  Goal: Discharge to home or other facility with appropriate resources  5/25/2025 1537 by Georgie Mccarty RN  Outcome: Progressing     Problem: Skin/Tissue Integrity  Goal: Skin integrity remains intact  Description: 1.  Monitor for areas of redness and/or skin breakdown2.  Assess vascular access sites hourly3.  Every 4-6 hours minimum:  Change oxygen saturation probe site4.  Every 4-6 hours:  If on nasal continuous positive airway pressure, respiratory therapy assess nares and determine need for appliance change or resting period  5/25/2025 1537 by Georgie Mccarty, RN  Outcome: Progressing

## 2025-05-26 NOTE — PLAN OF CARE
Problem: Safety - Adult  Goal: Free from fall injury  5/26/2025 1108 by Georgie Mccarty RN  Outcome: Progressing  5/25/2025 2309 by Johny Singh RN  Outcome: Progressing  5/25/2025 2248 by Toyin Avendano RN  Outcome: Progressing     Problem: Chronic Conditions and Co-morbidities  Goal: Patient's chronic conditions and co-morbidity symptoms are monitored and maintained or improved  5/26/2025 1108 by Georgie Mccarty RN  Outcome: Progressing  5/25/2025 2309 by Johny Singh RN  Outcome: Progressing  5/25/2025 2248 by Toyin Avendano RN  Outcome: Progressing     Problem: Discharge Planning  Goal: Discharge to home or other facility with appropriate resources  5/26/2025 1108 by Georgie Mccarty RN  Outcome: Progressing  5/25/2025 2309 by Johny Singh RN  Outcome: Progressing     Problem: Skin/Tissue Integrity  Goal: Skin integrity remains intact  Description: 1.  Monitor for areas of redness and/or skin breakdown2.  Assess vascular access sites hourly3.  Every 4-6 hours minimum:  Change oxygen saturation probe site4.  Every 4-6 hours:  If on nasal continuous positive airway pressure, respiratory therapy assess nares and determine need for appliance change or resting period  5/26/2025 1108 by Georgie Mccarty RN  Outcome: Progressing  5/25/2025 2309 by Johny Singh RN  Outcome: Progressing     Problem: ABCDS Injury Assessment  Goal: Absence of physical injury  Outcome: Progressing     Problem: Pain  Goal: Verbalizes/displays adequate comfort level or baseline comfort level  Outcome: Progressing     Problem: Neurosensory - Adult  Goal: Achieves stable or improved neurological status  Outcome: Progressing     Problem: Respiratory - Adult  Goal: Achieves optimal ventilation and oxygenation  Outcome: Progressing

## 2025-05-26 NOTE — PROGRESS NOTES
[facility]  Department of Pulmonary, Critical Care and Sleep Medicine  [facility] [place]  Department of Internal Medicine  Progress Note    SUBJECTIVE:    No CP,   SOB improved  Tolerating current oxygen therapy   Reviewed overnight events with staff.  Checked the monitors & laboratory tests.  No complaints of pain at this time.     OBJECTIVE:  Vitals:    05/25/25 2200 05/26/25 0205 05/26/25 0724 05/26/25 1025   BP: (!) 110/43  125/63    Pulse: 60  61    Resp: 19  18    Temp: 97.3 °F (36.3 °C)  97.5 °F (36.4 °C)    TempSrc: Temporal  Temporal    SpO2: 98%  100% 99%   Weight:  91 kg (200 lb 9.9 oz)     Height:         Constitutional: Alert,     EENT: EOMI ESTRELLA. MMM. No icterus. No thrush.     Neck: No thyromegaly. No elevated JVP. Trachea was midline.   Respiratory: Symmetrical.  Breath sounds were clear.    Cardiovascular: Regular, No murmur. No rubs.      Pulses:  Equal bilaterally.    Abdomen: Soft without organomegaly. No rebound, rigidity.  No guarding.  Lymphatic: No lymphadenopathy.  Musculoskeletal: Without weakness or gross deficits  Extremities:  No lower extremity edema. Reflexes appear adequate.   Skin:  Warm and dry.  No skin rashes.   Neurological/Psychiatric: No acute psychosis. Cranial nerves are intact.        DATA:  The data collected below information that was obtained, reviewed, analyzed and interpreted today. Imaging test are reviewed with the radiologist during weekly conference rounds. Comparison to previous images are always explored.     Monitor Strips:  My interpretation and reviewed of the cardiac monitor and further discussion with technical team reveals no changes noted and the patient is in sinus rhythm     RADIOLOGY:  My interpretation and review of the current films reveals  congestion      CBC:   Lab Results   Component Value Date/Time    WBC 12.4 05/26/2025 04:56 AM    RBC 2.65 05/26/2025 04:56 AM    HGB 7.9 05/26/2025 04:56 AM

## 2025-05-27 ENCOUNTER — APPOINTMENT (OUTPATIENT)
Dept: GENERAL RADIOLOGY | Age: 70
DRG: 871 | End: 2025-05-27
Payer: MEDICARE

## 2025-05-27 LAB
ABO + RH BLD: NORMAL
ALBUMIN SERPL-MCNC: 3.8 G/DL (ref 3.5–5.2)
ALP SERPL-CCNC: 97 U/L (ref 40–129)
ALT SERPL-CCNC: 28 U/L (ref 0–50)
ANION GAP SERPL CALCULATED.3IONS-SCNC: 14 MMOL/L (ref 7–16)
ARM BAND NUMBER: NORMAL
AST SERPL-CCNC: 32 U/L (ref 0–50)
BASOPHILS # BLD: 0.06 K/UL (ref 0–0.2)
BASOPHILS NFR BLD: 0 % (ref 0–2)
BILIRUB SERPL-MCNC: 0.6 MG/DL (ref 0–1.2)
BLOOD BANK SAMPLE EXPIRATION: NORMAL
BLOOD GROUP ANTIBODIES SERPL: NEGATIVE
BNP SERPL-MCNC: ABNORMAL PG/ML (ref 0–125)
BUN SERPL-MCNC: 33 MG/DL (ref 8–23)
CALCIUM SERPL-MCNC: 9.1 MG/DL (ref 8.8–10.2)
CHLORIDE SERPL-SCNC: 96 MMOL/L (ref 98–107)
CO2 SERPL-SCNC: 24 MMOL/L (ref 22–29)
CREAT SERPL-MCNC: 1.8 MG/DL (ref 0.7–1.2)
EKG ATRIAL RATE: 59 BPM
EKG P-R INTERVAL: 186 MS
EKG Q-T INTERVAL: 482 MS
EKG QRS DURATION: 166 MS
EKG QTC CALCULATION (BAZETT): 485 MS
EKG R AXIS: 114 DEGREES
EKG T AXIS: 49 DEGREES
EKG VENTRICULAR RATE: 61 BPM
EOSINOPHIL # BLD: 0.39 K/UL (ref 0.05–0.5)
EOSINOPHILS RELATIVE PERCENT: 3 % (ref 0–6)
ERYTHROCYTE [DISTWIDTH] IN BLOOD BY AUTOMATED COUNT: 19.9 % (ref 11.5–15)
GFR, ESTIMATED: 39 ML/MIN/1.73M2
GLUCOSE BLD-MCNC: 137 MG/DL (ref 74–99)
GLUCOSE BLD-MCNC: 159 MG/DL (ref 74–99)
GLUCOSE BLD-MCNC: 166 MG/DL (ref 74–99)
GLUCOSE BLD-MCNC: 228 MG/DL (ref 74–99)
GLUCOSE SERPL-MCNC: 147 MG/DL (ref 74–99)
HCT VFR BLD AUTO: 30.4 % (ref 37–54)
HGB BLD-MCNC: 9.2 G/DL (ref 12.5–16.5)
IMM GRANULOCYTES # BLD AUTO: 0.31 K/UL (ref 0–0.58)
IMM GRANULOCYTES NFR BLD: 2 % (ref 0–5)
LYMPHOCYTES NFR BLD: 0.9 K/UL (ref 1.5–4)
LYMPHOCYTES RELATIVE PERCENT: 6 % (ref 20–42)
MAGNESIUM SERPL-MCNC: 1.4 MG/DL (ref 1.6–2.4)
MCH RBC QN AUTO: 29.6 PG (ref 26–35)
MCHC RBC AUTO-ENTMCNC: 30.3 G/DL (ref 32–34.5)
MCV RBC AUTO: 97.7 FL (ref 80–99.9)
MICROORGANISM SPEC CULT: NORMAL
MICROORGANISM/AGENT SPEC: NORMAL
MONOCYTES NFR BLD: 1.52 K/UL (ref 0.1–0.95)
MONOCYTES NFR BLD: 11 % (ref 2–12)
NEUTROPHILS NFR BLD: 77 % (ref 43–80)
NEUTS SEG NFR BLD: 10.88 K/UL (ref 1.8–7.3)
PATH REV BLD -IMP: NORMAL
PHOSPHATE SERPL-MCNC: 4.6 MG/DL (ref 2.5–4.5)
PLATELET # BLD AUTO: 153 K/UL (ref 130–450)
PMV BLD AUTO: 10.1 FL (ref 7–12)
POTASSIUM SERPL-SCNC: 3.7 MMOL/L (ref 3.5–5.1)
PROT SERPL-MCNC: 6.9 G/DL (ref 6.4–8.3)
RBC # BLD AUTO: 3.11 M/UL (ref 3.8–5.8)
RBC # BLD: ABNORMAL 10*6/UL
SODIUM SERPL-SCNC: 134 MMOL/L (ref 136–145)
SPECIMEN DESCRIPTION: NORMAL
WBC OTHER # BLD: 14.1 K/UL (ref 4.5–11.5)

## 2025-05-27 PROCEDURE — 6370000000 HC RX 637 (ALT 250 FOR IP)

## 2025-05-27 PROCEDURE — 92526 ORAL FUNCTION THERAPY: CPT

## 2025-05-27 PROCEDURE — 80053 COMPREHEN METABOLIC PANEL: CPT

## 2025-05-27 PROCEDURE — 6370000000 HC RX 637 (ALT 250 FOR IP): Performed by: INTERNAL MEDICINE

## 2025-05-27 PROCEDURE — 36415 COLL VENOUS BLD VENIPUNCTURE: CPT

## 2025-05-27 PROCEDURE — 86900 BLOOD TYPING SEROLOGIC ABO: CPT

## 2025-05-27 PROCEDURE — 82962 GLUCOSE BLOOD TEST: CPT

## 2025-05-27 PROCEDURE — 94640 AIRWAY INHALATION TREATMENT: CPT

## 2025-05-27 PROCEDURE — 86850 RBC ANTIBODY SCREEN: CPT

## 2025-05-27 PROCEDURE — 84100 ASSAY OF PHOSPHORUS: CPT

## 2025-05-27 PROCEDURE — 85025 COMPLETE CBC W/AUTO DIFF WBC: CPT

## 2025-05-27 PROCEDURE — 99232 SBSQ HOSP IP/OBS MODERATE 35: CPT | Performed by: INTERNAL MEDICINE

## 2025-05-27 PROCEDURE — 86901 BLOOD TYPING SEROLOGIC RH(D): CPT

## 2025-05-27 PROCEDURE — 71045 X-RAY EXAM CHEST 1 VIEW: CPT

## 2025-05-27 PROCEDURE — 1200000000 HC SEMI PRIVATE

## 2025-05-27 PROCEDURE — 2700000000 HC OXYGEN THERAPY PER DAY

## 2025-05-27 PROCEDURE — 94660 CPAP INITIATION&MGMT: CPT

## 2025-05-27 PROCEDURE — 6360000002 HC RX W HCPCS

## 2025-05-27 PROCEDURE — 6360000002 HC RX W HCPCS: Performed by: INTERNAL MEDICINE

## 2025-05-27 PROCEDURE — 83735 ASSAY OF MAGNESIUM: CPT

## 2025-05-27 PROCEDURE — 2500000003 HC RX 250 WO HCPCS

## 2025-05-27 PROCEDURE — 83880 ASSAY OF NATRIURETIC PEPTIDE: CPT

## 2025-05-27 PROCEDURE — 99233 SBSQ HOSP IP/OBS HIGH 50: CPT | Performed by: INTERNAL MEDICINE

## 2025-05-27 RX ORDER — POTASSIUM CHLORIDE 1500 MG/1
40 TABLET, EXTENDED RELEASE ORAL ONCE
Status: COMPLETED | OUTPATIENT
Start: 2025-05-27 | End: 2025-05-27

## 2025-05-27 RX ORDER — MAGNESIUM SULFATE IN WATER 40 MG/ML
2000 INJECTION, SOLUTION INTRAVENOUS ONCE
Status: COMPLETED | OUTPATIENT
Start: 2025-05-27 | End: 2025-05-27

## 2025-05-27 RX ADMIN — ACETAMINOPHEN 650 MG: 325 TABLET ORAL at 21:05

## 2025-05-27 RX ADMIN — SALINE NASAL SPRAY 1 SPRAY: 1.5 SOLUTION NASAL at 20:31

## 2025-05-27 RX ADMIN — MIDODRINE HYDROCHLORIDE 20 MG: 10 TABLET ORAL at 17:02

## 2025-05-27 RX ADMIN — INSULIN LISPRO 1 UNITS: 100 INJECTION, SOLUTION INTRAVENOUS; SUBCUTANEOUS at 21:06

## 2025-05-27 RX ADMIN — LEVOTHYROXINE SODIUM 150 MCG: 0.1 TABLET ORAL at 06:01

## 2025-05-27 RX ADMIN — METOPROLOL SUCCINATE 12.5 MG: 25 TABLET, EXTENDED RELEASE ORAL at 08:52

## 2025-05-27 RX ADMIN — ASPIRIN 81 MG: 81 TABLET, COATED ORAL at 08:52

## 2025-05-27 RX ADMIN — SALINE NASAL SPRAY 1 SPRAY: 1.5 SOLUTION NASAL at 16:06

## 2025-05-27 RX ADMIN — SERTRALINE 50 MG: 50 TABLET, FILM COATED ORAL at 08:52

## 2025-05-27 RX ADMIN — MIDODRINE HYDROCHLORIDE 20 MG: 10 TABLET ORAL at 08:52

## 2025-05-27 RX ADMIN — BUMETANIDE 2 MG: 1 TABLET ORAL at 17:02

## 2025-05-27 RX ADMIN — MAGNESIUM SULFATE HEPTAHYDRATE 2000 MG: 40 INJECTION, SOLUTION INTRAVENOUS at 10:07

## 2025-05-27 RX ADMIN — IPRATROPIUM BROMIDE AND ALBUTEROL SULFATE 1 DOSE: 2.5; .5 SOLUTION RESPIRATORY (INHALATION) at 20:13

## 2025-05-27 RX ADMIN — Medication 2000 UNITS: at 08:52

## 2025-05-27 RX ADMIN — BUMETANIDE 2 MG: 1 TABLET ORAL at 08:52

## 2025-05-27 RX ADMIN — SODIUM CHLORIDE, PRESERVATIVE FREE 10 ML: 5 INJECTION INTRAVENOUS at 20:31

## 2025-05-27 RX ADMIN — SODIUM CHLORIDE, PRESERVATIVE FREE 10 ML: 5 INJECTION INTRAVENOUS at 08:59

## 2025-05-27 RX ADMIN — EPOETIN ALFA-EPBX 3000 UNITS: 3000 INJECTION, SOLUTION INTRAVENOUS; SUBCUTANEOUS at 17:03

## 2025-05-27 RX ADMIN — POTASSIUM CHLORIDE 40 MEQ: 1500 TABLET, EXTENDED RELEASE ORAL at 10:05

## 2025-05-27 RX ADMIN — ATORVASTATIN CALCIUM 40 MG: 40 TABLET, FILM COATED ORAL at 08:52

## 2025-05-27 RX ADMIN — IPRATROPIUM BROMIDE AND ALBUTEROL SULFATE 1 DOSE: 2.5; .5 SOLUTION RESPIRATORY (INHALATION) at 07:54

## 2025-05-27 RX ADMIN — INSULIN LISPRO 2 UNITS: 100 INJECTION, SOLUTION INTRAVENOUS; SUBCUTANEOUS at 12:09

## 2025-05-27 RX ADMIN — MIDODRINE HYDROCHLORIDE 20 MG: 10 TABLET ORAL at 12:09

## 2025-05-27 RX ADMIN — ACETAMINOPHEN 650 MG: 325 TABLET ORAL at 06:01

## 2025-05-27 RX ADMIN — METOLAZONE 5 MG: 5 TABLET ORAL at 08:53

## 2025-05-27 RX ADMIN — IPRATROPIUM BROMIDE AND ALBUTEROL SULFATE 1 DOSE: 2.5; .5 SOLUTION RESPIRATORY (INHALATION) at 15:31

## 2025-05-27 ASSESSMENT — PAIN DESCRIPTION - LOCATION
LOCATION: BACK
LOCATION: BACK

## 2025-05-27 ASSESSMENT — PAIN SCALES - GENERAL
PAINLEVEL_OUTOF10: 8
PAINLEVEL_OUTOF10: 9

## 2025-05-27 ASSESSMENT — PAIN DESCRIPTION - ORIENTATION
ORIENTATION: LOWER;MID
ORIENTATION: MID

## 2025-05-27 ASSESSMENT — PAIN DESCRIPTION - DESCRIPTORS: DESCRIPTORS: ACHING;DISCOMFORT;DULL

## 2025-05-27 NOTE — PROGRESS NOTES
Comprehensive Nutrition Assessment    Type and Reason for Visit:  Reassess    Nutrition Recommendations/Plan:   Continue diet per SLP recs. Will modify ONS to nepro once daily to promote nutrient/PO intake.      Malnutrition Assessment:  Malnutrition Status:  At risk for malnutrition (05/21/25 1407)    Context:  Acute Illness     Findings of the 6 clinical characteristics of malnutrition:  Energy Intake:  Unable to assess  Weight Loss:  Unable to assess (d/t fluid shifts)     Body Fat Loss:  No body fat loss     Muscle Mass Loss:  No muscle mass loss    Fluid Accumulation:  No fluid accumulation     Strength:  Not Performed    Nutrition Assessment:    Pt admit from ECF d/t RADHA. PMHx DM, CHF, Afib, COPD, CKD, HTN, HLD, chronic anemia, gout. Noted recent admit w/ SOB/RADHA, persistent Afib s/p pacemaker upgrade. Pt noted w/ recurrent epistaxis s/p chemical cauterization, Noted worsening dyspnea w/ need for Bipap, & trx to MICU for closer monitoring. S/p trx out on 5/22. Noted shock, likely septic-resolved and found to have B/L pleural effusions s/p CT 5/18. Course complicated by euglycemic DKA. HD initiated 5/16 and pt. for peritoneal dialysis catheter placement on 5/29 vs 5/30 per GS. Noted dysphagia s/p MBSS on 5/22; pt. currently on soft and bite sized diet per SLP recs. Will modify ONS and continue to monitor.    Nutrition Related Findings:    A&Ox4, disoriented at times, -I/O(11.3L), obese abd, active BS,  nause/abd tenderness, HD (held), +1 edema, elevated BUN/Cr/Phos, low Mg, hyperglycemia, hyponatremia, Wound Type: Surgical Incision       Current Nutrition Intake & Therapies:    Average Meal Intake: 51-75% (average? Limited intakes to assess)  Average Supplements Intake: Unable to assess  ADULT ORAL NUTRITION SUPPLEMENT; Breakfast, Dinner; Diabetic Oral Supplement  ADULT DIET; Dysphagia - Soft and Bite Sized; 1000 ml    Anthropometric Measures:  Height: 170.2 cm (5' 7.01\")  Ideal Body Weight (IBW): 148 lbs

## 2025-05-27 NOTE — PROGRESS NOTES
[facility]  Department of Pulmonary, Critical Care and Sleep Medicine  P[facility] Health [facility] [place]  Department of Internal Medicine  Progress Note    SUBJECTIVE:    No CP,   SOB improved  Tolerating current oxygen therapy   Reviewed overnight events with staff.  Checked the monitors & laboratory tests.  No complaints of pain at this time.     OBJECTIVE:  Vitals:    05/26/25 1025 05/26/25 2000 05/27/25 0815 05/27/25 1028   BP:  (!) 115/54 (!) 109/52    Pulse:  61 65    Resp:  20 18    Temp:  97 °F (36.1 °C) 98.2 °F (36.8 °C)    TempSrc:  Temporal Temporal    SpO2: 99% 96% 100%    Weight:       Height:    1.702 m (5' 7.01\")     Constitutional: Alert,     EENT: EOMI ESTRELLA. MMM. No icterus. No thrush.     Neck: No thyromegaly. No elevated JVP. Trachea was midline.   Respiratory: Symmetrical.  Breath sounds were clear.    Cardiovascular: Regular, No murmur. No rubs.      Pulses:  Equal bilaterally.    Abdomen: Soft without organomegaly. No rebound, rigidity.  No guarding.  Lymphatic: No lymphadenopathy.  Musculoskeletal: Without weakness or gross deficits  Extremities:  No lower extremity edema. Reflexes appear adequate.   Skin:  Warm and dry.  No skin rashes.   Neurological/Psychiatric: No acute psychosis. Cranial nerves are intact.        DATA:  The data collected below information that was obtained, reviewed, analyzed and interpreted today. Imaging test are reviewed with the radiologist during weekly conference rounds. Comparison to previous images are always explored.     Monitor Strips:  My interpretation and reviewed of the cardiac monitor and further discussion with technical team reveals no changes noted and the patient is in sinus rhythm     RADIOLOGY:  My interpretation and review of the current films reveals  congestion      CBC:   Lab Results   Component Value Date/Time    WBC 14.1 05/27/2025 05:13 AM    RBC 3.11 05/27/2025 05:13 AM    HGB 9.2 05/27/2025 05:13 AM     HCT 30.4 05/27/2025 05:13 AM    MCV 97.7 05/27/2025 05:13 AM    MCH 29.6 05/27/2025 05:13 AM    MCHC 30.3 05/27/2025 05:13 AM    RDW 19.9 05/27/2025 05:13 AM     05/27/2025 05:13 AM    MPV 10.1 05/27/2025 05:13 AM     CMP:    Lab Results   Component Value Date/Time     05/27/2025 05:13 AM    K 3.7 05/27/2025 05:13 AM    K 5.1 01/08/2023 05:35 AM    CL 96 05/27/2025 05:13 AM    CO2 24 05/27/2025 05:13 AM    BUN 33 05/27/2025 05:13 AM    CREATININE 1.8 05/27/2025 05:13 AM    GFRAA >60 10/03/2022 10:55 AM    LABGLOM 39 05/27/2025 05:13 AM    LABGLOM 60 04/19/2024 10:45 AM    GLUCOSE 147 05/27/2025 05:13 AM    GLUCOSE 133 04/18/2012 08:43 AM    CALCIUM 9.1 05/27/2025 05:13 AM    BILITOT 0.6 05/27/2025 05:13 AM    ALKPHOS 97 05/27/2025 05:13 AM    AST 32 05/27/2025 05:13 AM    ALT 28 05/27/2025 05:13 AM       CLINICAL ASSESMENT: Wander Rocha is a 69-year-old male known to us, past medical history CKD stage 3b probably 2/2 nephrosclerosis and previous episode of ischemic ATN requiring temporary HD in May 2017, recurrent episode of ischemic ATN in March 2022 also requiring HD x2 with fair recovery of renal function, baseline creatinine 2.0 mg/dL, HTN, DM type II, A. fib on apixaban, HFpEF 50-55%, NIKO, pacemaker placement, hypothyroidism, hyperlipidemia, who was recently discharged and readmitted on 5/14/2025 for abnormal labs. Lab work showed sodium 130, potassium 5.5 mmol/L, , creatinine 4.9 mL grams per deciliter, proBNP 3981, reason for this consultation. Chest x-ray shows bilateral pleural effusions. Significant home medications include Jardiance, acetazolamide, Bumex, lisinopril and metoprolol.   Persistent atrial fibrillation, on apixaban  Pacemaker insertion 4/23/2025  NIKO  Hypothyroidism, on levothyroxine  Hyperlipidemia, on atorvastatin  Type II DM, on DKA protocol  Persistent atrial fibrillation, [id number] Score: 4  Sinus node disorder s/p dual chamber pacemaker insertion (04/23/2025) s/p

## 2025-05-27 NOTE — PROGRESS NOTES
ENDOCRINOLOGY PROGRESS NOTE      Date of admission: 5/14/2025  Date of service: 5/27/2025  Admitting physician: Martell Rosenberg MD   Primary Care Physician: Oly Crespo MD  Consultant physician: Luiz Luciano MD     Reason for the consultation:  Uncontrolled DM    History of Present Illness:  The history is provided by the patient. Accuracy of the patient data is excellent    Wander Rocha is a very pleasant 69 y.o. old male with PMH of poorly controlled type 2 diabetes, CKD, A-fib on oral anticoagulation, primary hypothyroidism, dyslipidemia and other listed below admitted to [facility] on 5/14/2025 because of RADHA, endocrine service was consulted for diabetes management.     Subjective  Most glucose level in range, no hypoglycemia    Point of care glucose monitoring   (Independently reviewed)   Recent Labs     05/26/25  0539 05/26/25  1141 05/26/25  1639 05/26/25  2057 05/27/25  0604 05/27/25  1125 05/27/25  1646 05/27/25 2029   POCGLU 111* 204* 121* 126* 159* 228* 137* 166*      Allergy and drug reactions:   Allergies   Allergen Reactions    Jardiance [Empagliflozin] Other (See Comments)     Episode of euglycemic DKA 5/2025; no further benefit seen in continuing therapy       Scheduled Meds:   insulin lispro  0-6 Units SubCUTAneous 4x Daily AC & HS    levothyroxine  150 mcg Oral Daily    metOLazone  5 mg Oral Daily    midodrine  20 mg Oral TID WC    lidocaine  1 patch TransDERmal Daily    [Held by provider] lactulose  20 g Oral TID    [Held by provider] rifAXIMin  400 mg Oral TID    ipratropium 0.5 mg-albuterol 2.5 mg  1 Dose Inhalation Q4H WA RT    [Held by provider] sevelamer  0.8 g Oral TID WC    sodium chloride  1 spray Each Nostril TID    metoprolol succinate  12.5 mg Oral Daily    epoetin lay-epbx  3,000 Units SubCUTAneous Once per day on Tuesday Thursday Saturday    sodium chloride flush  5-40 mL IntraVENous 2 times per day    [Held by provider] allopurinol  200 mg Oral Daily    [Held by provider]

## 2025-05-27 NOTE — PROGRESS NOTES
[facility]  Internal Medicine Residency Program  House Team Progress Note      Patient:  Wander Rocha 69 y.o. male MRN: [id number] : 1955   Admitted:  2025  2:21 PM    Date of Service:  2025    Room: [address] [place] [address]-A    Chief complaint: had concerns including Abnormal Lab (Sent in from [facility] for elevated CR).     The patient is a 69 y.o. male,  has a past medical history of RADHA (acute kidney injury), Atrial fibrillation (HCC), Carpal tunnel syndrome, Colorectal polyps, Diverticula of colon, Encounter regarding vascular access for dialysis for ESRD (HCC), Hyperlipidemia, Hypertension, Hypothyroidism, Lung nodule, Lung nodules, Nocturnal hypoxemia due to obesity, Obesity, NIKO (obstructive sleep apnea), Osteoarthritis, Pacemaker, Pacemaker, Pinched nerve, Restrictive lung disease secondary to obesity, S/P laminectomy with spinal fusion, Sinus node dysfunction (HCC), Skin lesion of face, and Type II or unspecified type diabetes mellitus without mention of complication, not stated as uncontrolled., had concerns including Abnormal Lab (Sent in from [facility] for elevated CR)., actively being managed for acute decompensated heart failure and acute renal failure    Subjective     Interval History:  He presented to the ED with acute onset shortness of breath, increasing oxygen requirements, bilateral leg swelling, and oliguria over the past 24 hours. He was transferred from his [facility] facility due to concerns for RADHA. Notably, he had a similar admission in 2025 for the same complaints, during which he was treated with a Bumex drip and discharged on oral bumex. On arrival today, he was hypotensive, requiring 500mL fluid bolus, and was placed on 3L/min via nasal cannula. Initial labs were pertinent for hyponatremia (130), elevated creatinine (4.9), elevated proBNP (3981) and troponin(61), and low hemoglobin (8.0). CXR demonstrated bilateral pulmonary edema and

## 2025-05-27 NOTE — PLAN OF CARE
Problem: Safety - Adult  Goal: Free from fall injury  5/27/2025 1252 by Dora Alvarez RN  Outcome: Progressing  5/27/2025 1249 by Dora Alvarez RN  Outcome: Progressing  5/27/2025 0547 by Analia Sky RN  Outcome: Progressing     Problem: Chronic Conditions and Co-morbidities  Goal: Patient's chronic conditions and co-morbidity symptoms are monitored and maintained or improved  5/27/2025 1252 by Dora Alvarez RN  Outcome: Progressing  5/27/2025 1249 by Dora Alvarez RN  Outcome: Progressing  5/27/2025 0547 by Analia Sky RN  Outcome: Progressing     Problem: Discharge Planning  Goal: Discharge to home or other facility with appropriate resources  5/27/2025 1252 by Dora Alvarez RN  Outcome: Progressing  5/27/2025 1249 by Dora Alvarez RN  Outcome: Progressing  5/27/2025 0547 by Analia Sky RN  Outcome: Progressing     Problem: Skin/Tissue Integrity  Goal: Skin integrity remains intact  Description: 1.  Monitor for areas of redness and/or skin breakdown2.  Assess vascular access sites hourly3.  Every 4-6 hours minimum:  Change oxygen saturation probe site4.  Every 4-6 hours:  If on nasal continuous positive airway pressure, respiratory therapy assess nares and determine need for appliance change or resting period  5/27/2025 1252 by Dora Alvarez RN  Outcome: Progressing  5/27/2025 1249 by Dora Alvarez RN  Outcome: Progressing  5/27/2025 0547 by Analia Sky RN  Outcome: Progressing     Problem: ABCDS Injury Assessment  Goal: Absence of physical injury  5/27/2025 1252 by Dora Alvarez RN  Outcome: Progressing  5/27/2025 1249 by Dora Alvarez RN  Outcome: Progressing  5/27/2025 0547 by Analia Sky RN  Outcome: Progressing     Problem: Pain  Goal: Verbalizes/displays adequate comfort level or baseline comfort level  5/27/2025 1252 by Dora Alvarez RN  Outcome: Progressing  5/27/2025 1249 by Dora Alvarez RN  Outcome: Progressing  5/27/2025 0547 by Analia Sky RN  Outcome:

## 2025-05-27 NOTE — PROGRESS NOTES
Department of Internal Medicine  Nephrology Progress Note      Events reviewed.    SUBJECTIVE: We are following Mr. Wander Rocha for CKD.  Reports no new complaints.    PHYSICAL EXAM:      Vitals:    VITALS:  BP (!) 109/52   Pulse 65   Temp 98.2 °F (36.8 °C) (Temporal)   Resp 18   Ht 1.702 m (5' 7\")   Wt 91 kg (200 lb 9.9 oz)   SpO2 100%   BMI 31.42 kg/m²   24HR INTAKE/OUTPUT:    Intake/Output Summary (Last 24 hours) at 5/27/2025 0846  Last data filed at 5/27/2025 0636  Gross per 24 hour   Intake --   Output 1300 ml   Net -1300 ml       Access: Right IJ vein TDC catheter in place  Constitutional:  Awake, alert, oriented, in NAD  HEENT:  PERRLA, normocephalic, atraumatic  Respiratory:  wheezes  Cardiovascular/Edema:  RRR, normal S1, normal S2, + edema  Gastrointestinal: Distended  Neurologic:  Nonfocal  Skin:  warm, dry, no rashes, no lesions    Scheduled Meds:   [START ON 5/28/2025] ceFAZolin  2,000 mg IntraVENous On Call to OR    insulin lispro  0-6 Units SubCUTAneous 4x Daily AC & HS    levothyroxine  150 mcg Oral Daily    metOLazone  5 mg Oral Daily    midodrine  20 mg Oral TID WC    lidocaine  1 patch TransDERmal Daily    [Held by provider] lactulose  20 g Oral TID    [Held by provider] rifAXIMin  400 mg Oral TID    ipratropium 0.5 mg-albuterol 2.5 mg  1 Dose Inhalation Q4H WA RT    [Held by provider] sevelamer  0.8 g Oral TID WC    sodium chloride  1 spray Each Nostril TID    metoprolol succinate  12.5 mg Oral Daily    epoetin lay-epbx  3,000 Units SubCUTAneous Once per day on Tuesday Thursday Saturday    sodium chloride flush  5-40 mL IntraVENous 2 times per day    [Held by provider] allopurinol  200 mg Oral Daily    [Held by provider] apixaban  5 mg Oral BID    aspirin  81 mg Oral Daily    atorvastatin  40 mg Oral Daily    bumetanide  2 mg Oral BID    vitamin D  2,000 Units Oral Daily    sertraline  50 mg Oral Daily    [Held by provider] tamsulosin  0.4 mg Oral Daily     Continuous Infusions:

## 2025-05-27 NOTE — PLAN OF CARE
Problem: Safety - Adult  Goal: Free from fall injury  5/27/2025 1249 by Dora Alvarez RN  Outcome: Progressing  5/27/2025 0547 by Analia Sky RN  Outcome: Progressing     Problem: Chronic Conditions and Co-morbidities  Goal: Patient's chronic conditions and co-morbidity symptoms are monitored and maintained or improved  5/27/2025 1249 by Dora Alvarez RN  Outcome: Progressing  5/27/2025 0547 by Analia Sky RN  Outcome: Progressing     Problem: Discharge Planning  Goal: Discharge to home or other facility with appropriate resources  5/27/2025 1249 by Dora Alvarez RN  Outcome: Progressing  5/27/2025 0547 by Analia Sky RN  Outcome: Progressing     Problem: Skin/Tissue Integrity  Goal: Skin integrity remains intact  Description: 1.  Monitor for areas of redness and/or skin breakdown2.  Assess vascular access sites hourly3.  Every 4-6 hours minimum:  Change oxygen saturation probe site4.  Every 4-6 hours:  If on nasal continuous positive airway pressure, respiratory therapy assess nares and determine need for appliance change or resting period  5/27/2025 1249 by Dora Alvarez RN  Outcome: Progressing  5/27/2025 0547 by Analia Sky RN  Outcome: Progressing     Problem: ABCDS Injury Assessment  Goal: Absence of physical injury  5/27/2025 1249 by Dora Alvarez RN  Outcome: Progressing  5/27/2025 0547 by Analia Sky RN  Outcome: Progressing     Problem: Pain  Goal: Verbalizes/displays adequate comfort level or baseline comfort level  5/27/2025 1249 by Dora Alvarez RN  Outcome: Progressing  5/27/2025 0547 by Analia Sky RN  Outcome: Progressing     Problem: Neurosensory - Adult  Goal: Achieves stable or improved neurological status  5/27/2025 1249 by Dora Alvarez RN  Outcome: Progressing  5/27/2025 0547 by Analia Sky RN  Outcome: Progressing     Problem: Respiratory - Adult  Goal: Achieves optimal ventilation and oxygenation  5/27/2025 1249 by Dora Alvarez RN  Outcome:

## 2025-05-27 NOTE — PROGRESS NOTES
[facility]  PROGRESS NOTE  ATTENDING NOTE    Discussed with Dr. Chavez--consult was supposed to be for Dr. Abrams.  [id number] nursing notified and OR notified    Grace Eduardo MD, MSc, FACS  5/27/2025  10:43 AM

## 2025-05-27 NOTE — CONSULTS
Department of General Surgery - Adult  Surgical Service GS  Attending Consult Note      Reason for Consult:  ESRD  Requesting Physician:  HELGA Chavez MD    CHIEF COMPLAINT:  SOB    History Obtained From:  electronic medical record, facility    HISTORY OF PRESENT ILLNESS:                The patient is a 69 y.o. male who presents with failure to thrive and SOB.    Past Medical History:        Diagnosis Date    RADHA (acute kidney injury) 03/10/2022    Atrial fibrillation (HCC)     Carpal tunnel syndrome     Colorectal polyps 04/15/2013    Diverticula of colon 04/15/2013    Encounter regarding vascular access for dialysis for ESRD (HCC) 03/12/2022    Hyperlipidemia     Hypertension     Hypothyroidism     Lung nodule     Lung nodules 04/15/2013    Nocturnal hypoxemia due to obesity 08/08/2013    Obesity     NIKO (obstructive sleep apnea) 08/08/2013    [facility]    Osteoarthritis     Pacemaker     DOI 10/3/2017 [employer]; dual chamber; MRI conditional  Indication: Sinus node dysfunction     Pacemaker     DOI 10/3/2017  [employer]; dual chamber; MRI conditional   Indication: Sinus node dysfunction       Pinched nerve     Lumbar    Restrictive lung disease secondary to obesity 07/25/2016    S/P laminectomy with spinal fusion 04/15/2013    Done at [facility], March 2012     Sinus node dysfunction (HCC)     Skin lesion of face 11/16/2022    Type II or unspecified type diabetes mellitus without mention of complication, not stated as uncontrolled      Past Surgical History:        Procedure Laterality Date    BACK SURGERY  [address] [place] [address]. Pinched nerve in back    BACK SURGERY  05/30/2017    BACK SURGERY N/A 11/8/2022    EXCISON OF SOFT TISSUE NEOPLASM OF UPPER BACK performed by Santana Maurer MD at [facility] OR    BACK SURGERY Left 1/26/2023    BACK LESION EXCISION SKIN GRAFT performed by Santana Maurer MD at [facility] OR    COLONOSCOPY  2007    multiple colon polyps    COLONOSCOPY  06/04/2012    COLONOSCOPY

## 2025-05-27 NOTE — CARE COORDINATION
Care Coordination:   LTACH denial was appealed and at 3 :20 pm, we learned the appeal was upheld.  Per nephro, patient and family are opting for PD.  PD cath scheduled to be inserted tomorrow.  Met with wife who confirms plan and states she is wiling and able to learn to manage PD at home.  She believes he will need rehab.    Sw provided list of [facility]  approved PD providers. For SNF LOC.   Only ones are [facility] , [facility] and [facility]. Per CM updated list.  Wife has this list and will review. For choice.  PT OT asked for re evaluation ASAP.

## 2025-05-27 NOTE — PROGRESS NOTES
Department of Internal Medicine  Infectious Diseases  Progress  Note      C/C :   Leukocytosis , Klebsiella bacteriuria       Pt is awake   Reports cough and sputum expectoration   Afebrile         Current Facility-Administered Medications   Medication Dose Route Frequency Provider Last Rate Last Admin    insulin lispro (HUMALOG,ADMELOG) injection vial 0-6 Units  0-6 Units SubCUTAneous 4x Daily AC & HS Luiz Luciano MD   2 Units at 05/27/25 1209    levothyroxine (SYNTHROID) tablet 150 mcg  150 mcg Oral Daily Luiz Luciano MD   150 mcg at 05/27/25 0601    metOLazone (ZAROXOLYN) tablet 5 mg  5 mg Oral Daily Manoj Chavez MD   5 mg at 05/27/25 0853    heparin (porcine) injection 3,200 Units  3,200 Units IntraCATHeter PRN Manoj Chavez MD   3,200 Units at 05/20/25 1142    midodrine (PROAMATINE) tablet 20 mg  20 mg Oral TID WC Benjie Melvin MD   20 mg at 05/27/25 1209    lidocaine 4 % external patch 1 patch  1 patch TransDERmal Daily Benjie Melvin MD   1 patch at 05/27/25 0851    dextrose bolus 10% 125 mL  125 mL IntraVENous PRN Niki Hirsch MD        Or    dextrose bolus 10% 250 mL  250 mL IntraVENous PRN Niki Hirsch MD        magnesium sulfate 2000 mg in 50 mL IVPB premix  2,000 mg IntraVENous PRN Niki Hirsch MD        sodium phosphate 15 mmol in sodium chloride 0.9 % 250 mL IVPB  15 mmol IntraVENous PRN Niki Hirsch MD        dextrose 10 % infusion   IntraVENous Continuous PRN Niki Hirsch MD        [Held by provider] lactulose (CHRONULAC) 10 GM/15ML solution 20 g  20 g Oral TID Elijah Rios MD        [Held by provider] rifAXIMin (XIFAXAN) tablet 400 mg  400 mg Oral TID Elijah Rios MD        0.9 % sodium chloride infusion   IntraVENous PRN Lizzy Barbosa MD        ipratropium 0.5 mg-albuterol 2.5 mg (DUONEB) nebulizer solution 1 Dose  1 Dose Inhalation Q4H WA Mike Mirza MD   1 Dose at 05/27/25 1531    [Held by provider] sevelamer (RENVELA) packet  INR 1.6 05/19/2025 07:51 AM       TSH:    Lab Results   Component Value Date/Time    TSH 10.30 05/22/2025 04:08 AM       U/A:    Lab Results   Component Value Date/Time    COLORU Yellow 05/15/2025 08:45 AM    PHUR 5.5 05/15/2025 08:45 AM    PHUR 6.0 12/21/2023 05:01 PM    LABCAST FEW 05/25/2017 02:00 AM    WBCUA 0 TO 5 05/15/2025 08:45 AM    RBCUA 21 TO 50 05/15/2025 08:45 AM    BACTERIA 1+ 05/15/2025 08:45 AM    CLARITYU Clear 01/07/2023 11:06 PM    LEUKOCYTESUR SMALL 05/15/2025 08:45 AM    UROBILINOGEN 0.2 05/15/2025 08:45 AM    BILIRUBINUR NEGATIVE 05/15/2025 08:45 AM    BLOODU Negative 01/07/2023 11:06 PM    GLUCOSEU NEGATIVE 05/15/2025 08:45 AM       ABG:  No results found for: \"LLH5NVR\", \"BEART\", \"E9KEEWQV\", \"PHART\", \"THGBART\", \"CIX6ASO\", \"PO2ART\", \"JJI6FJN\"    MICROBIOLOGY:    Blood culture - negative     Urine Culture -    URINE    Culture KLEBSIELLA PNEUMONIAE 10 to 50,000 CFU/ML Identification by MALDI-TOF [facility]        Susceptibility    Klebsiella pneumoniae (1)    Antibiotic Interpretation ADAN Method Status    ampicillin Resistant >=32 BACTERIAL SUSCEPTIBILITY PANEL ADAN Final    ceFAZolin Intermediate 2 BACTERIAL SUSCEPTIBILITY PANEL ADAN Final     Cefazolin sensitivity results can be used to predict the effectiveness of oral  cephalosporins (eg. Cephalexin) in uncomplicated Urinary Tract Infections due to E. coli, K.   pneumoniae, and P. mirabilis       cefepime Sensitive <=0.12 BACTERIAL SUSCEPTIBILITY PANEL ADAN Final    cefOXitin Resistant 32 BACTERIAL SUSCEPTIBILITY PANEL ADAN Final    cefTRIAXone Sensitive <=0.25 BACTERIAL SUSCEPTIBILITY PANEL ADAN Final    ciprofloxacin Intermediate 0.5 BACTERIAL SUSCEPTIBILITY PANEL ADAN Final    ertapenem Sensitive <=0.12 BACTERIAL SUSCEPTIBILITY PANEL ADAN Final    levofloxacin Intermediate 1 BACTERIAL SUSCEPTIBILITY PANEL ADAN Final    meropenem Sensitive <=0.25 BACTERIAL SUSCEPTIBILITY PANEL ADAN Final

## 2025-05-27 NOTE — PROGRESS NOTES
SPEECH LANGUAGE PATHOLOGY  DAILY PROGRESS NOTE      PATIENT NAME:  Wander Rocha      :  1955          TODAY'S DATE:  2025 ROOM:  8417/8417-A    Current Diet: ADULT DIET; Dysphagia - Soft and Bite Sized; 1000 ml  ADULT ORAL NUTRITION SUPPLEMENT; Breakfast; Renal Oral Supplement      Patient was seen for ongoing dysphagia therapy to ensure he is safely consuming the least restrictive diet. His wife was present at bedside. During the session, he was administered hard solid food and thin liquid via cup/straw. Patient demonstrates mild oral phase dysphagia with hard solid food as displayed by prolonged mastication and oral residue throughout his cavity. He independently used compensatory strategies (e,g. small sips/bites, finger sweep, and alternated sips of liquid between bites of food) to reduce oral phase symptoms. No overt s/s of aspiration were documented with all consistencies given.     Recommendation: Patient is recommended to continue a soft and bite size diet versus regular diet with thin liquid via cup/straw. This is due to reduced dentition. SLP plans to sign off at this time as he is safely consuming the least restrictive diet. Please re-consult if issues arise. Patient and wife verbalized their understanding.    CPT code(s) 60382  dysphagia tx  Total minutes :  15 minutes    Andrew Wolfe M.S., CCC-SLP/L   Speech-Language Pathologist  [phone]

## 2025-05-28 ENCOUNTER — APPOINTMENT (OUTPATIENT)
Dept: INTERVENTIONAL RADIOLOGY/VASCULAR | Age: 70
DRG: 871 | End: 2025-05-28
Payer: MEDICARE

## 2025-05-28 LAB
ALBUMIN SERPL-MCNC: 3.6 G/DL (ref 3.5–5.2)
ALP SERPL-CCNC: 97 U/L (ref 40–129)
ALT SERPL-CCNC: 26 U/L (ref 0–50)
ANION GAP SERPL CALCULATED.3IONS-SCNC: 12 MMOL/L (ref 7–16)
AST SERPL-CCNC: 27 U/L (ref 0–50)
BASOPHILS # BLD: 0.07 K/UL (ref 0–0.2)
BASOPHILS NFR BLD: 1 % (ref 0–2)
BILIRUB SERPL-MCNC: 0.7 MG/DL (ref 0–1.2)
BUN SERPL-MCNC: 40 MG/DL (ref 8–23)
CALCIUM SERPL-MCNC: 9.2 MG/DL (ref 8.8–10.2)
CHLORIDE SERPL-SCNC: 94 MMOL/L (ref 98–107)
CO2 SERPL-SCNC: 30 MMOL/L (ref 22–29)
CREAT SERPL-MCNC: 1.7 MG/DL (ref 0.7–1.2)
EOSINOPHIL # BLD: 0.35 K/UL (ref 0.05–0.5)
EOSINOPHILS RELATIVE PERCENT: 3 % (ref 0–6)
ERYTHROCYTE [DISTWIDTH] IN BLOOD BY AUTOMATED COUNT: 19.3 % (ref 11.5–15)
GFR, ESTIMATED: 42 ML/MIN/1.73M2
GLUCOSE BLD-MCNC: 115 MG/DL (ref 74–99)
GLUCOSE BLD-MCNC: 145 MG/DL (ref 74–99)
GLUCOSE BLD-MCNC: 166 MG/DL (ref 74–99)
GLUCOSE BLD-MCNC: 206 MG/DL (ref 74–99)
GLUCOSE SERPL-MCNC: 146 MG/DL (ref 74–99)
HCT VFR BLD AUTO: 27.5 % (ref 37–54)
HGB BLD-MCNC: 8.5 G/DL (ref 12.5–16.5)
IMM GRANULOCYTES # BLD AUTO: 0.14 K/UL (ref 0–0.58)
IMM GRANULOCYTES NFR BLD: 1 % (ref 0–5)
LYMPHOCYTES NFR BLD: 0.98 K/UL (ref 1.5–4)
LYMPHOCYTES RELATIVE PERCENT: 8 % (ref 20–42)
MAGNESIUM SERPL-MCNC: 1.6 MG/DL (ref 1.6–2.4)
MCH RBC QN AUTO: 29.4 PG (ref 26–35)
MCHC RBC AUTO-ENTMCNC: 30.9 G/DL (ref 32–34.5)
MCV RBC AUTO: 95.2 FL (ref 80–99.9)
MICROORGANISM SPEC CULT: NORMAL
MICROORGANISM SPEC CULT: NORMAL
MONOCYTES NFR BLD: 1.32 K/UL (ref 0.1–0.95)
MONOCYTES NFR BLD: 11 % (ref 2–12)
NEUTROPHILS NFR BLD: 76 % (ref 43–80)
NEUTS SEG NFR BLD: 9.07 K/UL (ref 1.8–7.3)
PHOSPHATE SERPL-MCNC: 4.5 MG/DL (ref 2.5–4.5)
PLATELET # BLD AUTO: 158 K/UL (ref 130–450)
PMV BLD AUTO: 10 FL (ref 7–12)
POTASSIUM SERPL-SCNC: 4.1 MMOL/L (ref 3.5–5.1)
PROT SERPL-MCNC: 6.7 G/DL (ref 6.4–8.3)
RBC # BLD AUTO: 2.89 M/UL (ref 3.8–5.8)
SERVICE CMNT-IMP: NORMAL
SERVICE CMNT-IMP: NORMAL
SODIUM SERPL-SCNC: 135 MMOL/L (ref 136–145)
SPECIMEN DESCRIPTION: NORMAL
SPECIMEN DESCRIPTION: NORMAL
WBC OTHER # BLD: 11.9 K/UL (ref 4.5–11.5)

## 2025-05-28 PROCEDURE — 97530 THERAPEUTIC ACTIVITIES: CPT

## 2025-05-28 PROCEDURE — 2500000003 HC RX 250 WO HCPCS

## 2025-05-28 PROCEDURE — 6370000000 HC RX 637 (ALT 250 FOR IP)

## 2025-05-28 PROCEDURE — 82962 GLUCOSE BLOOD TEST: CPT

## 2025-05-28 PROCEDURE — 02PA33Z REMOVAL OF INFUSION DEVICE FROM HEART, PERCUTANEOUS APPROACH: ICD-10-PCS

## 2025-05-28 PROCEDURE — 84100 ASSAY OF PHOSPHORUS: CPT

## 2025-05-28 PROCEDURE — 94640 AIRWAY INHALATION TREATMENT: CPT

## 2025-05-28 PROCEDURE — 85025 COMPLETE CBC W/AUTO DIFF WBC: CPT

## 2025-05-28 PROCEDURE — 36415 COLL VENOUS BLD VENIPUNCTURE: CPT

## 2025-05-28 PROCEDURE — 0JPV3XZ REMOVAL OF TUNNELED VASCULAR ACCESS DEVICE FROM UPPER EXTREMITY SUBCUTANEOUS TISSUE AND FASCIA, PERCUTANEOUS APPROACH: ICD-10-PCS

## 2025-05-28 PROCEDURE — 6370000000 HC RX 637 (ALT 250 FOR IP): Performed by: INTERNAL MEDICINE

## 2025-05-28 PROCEDURE — 99232 SBSQ HOSP IP/OBS MODERATE 35: CPT | Performed by: INTERNAL MEDICINE

## 2025-05-28 PROCEDURE — 83735 ASSAY OF MAGNESIUM: CPT

## 2025-05-28 PROCEDURE — 76000 FLUOROSCOPY <1 HR PHYS/QHP: CPT

## 2025-05-28 PROCEDURE — 1200000000 HC SEMI PRIVATE

## 2025-05-28 PROCEDURE — 2700000000 HC OXYGEN THERAPY PER DAY

## 2025-05-28 PROCEDURE — 80053 COMPREHEN METABOLIC PANEL: CPT

## 2025-05-28 RX ORDER — METOLAZONE 5 MG/1
5 TABLET ORAL
Status: DISCONTINUED | OUTPATIENT
Start: 2025-05-30 | End: 2025-05-29 | Stop reason: HOSPADM

## 2025-05-28 RX ADMIN — SALINE NASAL SPRAY 1 SPRAY: 1.5 SOLUTION NASAL at 08:53

## 2025-05-28 RX ADMIN — ASPIRIN 81 MG: 81 TABLET, COATED ORAL at 08:43

## 2025-05-28 RX ADMIN — INSULIN LISPRO 2 UNITS: 100 INJECTION, SOLUTION INTRAVENOUS; SUBCUTANEOUS at 12:05

## 2025-05-28 RX ADMIN — MIDODRINE HYDROCHLORIDE 20 MG: 10 TABLET ORAL at 08:44

## 2025-05-28 RX ADMIN — MIDODRINE HYDROCHLORIDE 20 MG: 10 TABLET ORAL at 12:05

## 2025-05-28 RX ADMIN — IPRATROPIUM BROMIDE AND ALBUTEROL SULFATE 1 DOSE: 2.5; .5 SOLUTION RESPIRATORY (INHALATION) at 20:44

## 2025-05-28 RX ADMIN — SALINE NASAL SPRAY 1 SPRAY: 1.5 SOLUTION NASAL at 22:18

## 2025-05-28 RX ADMIN — SODIUM CHLORIDE, PRESERVATIVE FREE 10 ML: 5 INJECTION INTRAVENOUS at 08:52

## 2025-05-28 RX ADMIN — INSULIN LISPRO 2 UNITS: 100 INJECTION, SOLUTION INTRAVENOUS; SUBCUTANEOUS at 22:24

## 2025-05-28 RX ADMIN — IPRATROPIUM BROMIDE AND ALBUTEROL SULFATE 1 DOSE: 2.5; .5 SOLUTION RESPIRATORY (INHALATION) at 16:05

## 2025-05-28 RX ADMIN — Medication 2000 UNITS: at 08:45

## 2025-05-28 RX ADMIN — ACETAMINOPHEN 650 MG: 325 TABLET ORAL at 05:03

## 2025-05-28 RX ADMIN — SODIUM CHLORIDE, PRESERVATIVE FREE 10 ML: 5 INJECTION INTRAVENOUS at 22:18

## 2025-05-28 RX ADMIN — BUMETANIDE 2 MG: 1 TABLET ORAL at 17:27

## 2025-05-28 RX ADMIN — SERTRALINE 50 MG: 50 TABLET, FILM COATED ORAL at 08:44

## 2025-05-28 RX ADMIN — ATORVASTATIN CALCIUM 40 MG: 40 TABLET, FILM COATED ORAL at 08:44

## 2025-05-28 RX ADMIN — MIDODRINE HYDROCHLORIDE 20 MG: 10 TABLET ORAL at 17:24

## 2025-05-28 ASSESSMENT — PAIN DESCRIPTION - DESCRIPTORS: DESCRIPTORS: ACHING;DISCOMFORT;DULL

## 2025-05-28 ASSESSMENT — PAIN DESCRIPTION - PAIN TYPE: TYPE: CHRONIC PAIN

## 2025-05-28 ASSESSMENT — PAIN SCALES - GENERAL: PAINLEVEL_OUTOF10: 7

## 2025-05-28 ASSESSMENT — ENCOUNTER SYMPTOMS
ABDOMINAL DISTENTION: 0
COUGH: 0
ABDOMINAL PAIN: 0
SHORTNESS OF BREATH: 0
WHEEZING: 0
CHOKING: 0

## 2025-05-28 ASSESSMENT — PAIN SCALES - WONG BAKER: WONGBAKER_NUMERICALRESPONSE: NO HURT

## 2025-05-28 ASSESSMENT — PAIN - FUNCTIONAL ASSESSMENT: PAIN_FUNCTIONAL_ASSESSMENT: PREVENTS OR INTERFERES SOME ACTIVE ACTIVITIES AND ADLS

## 2025-05-28 ASSESSMENT — PAIN DESCRIPTION - LOCATION: LOCATION: BACK

## 2025-05-28 ASSESSMENT — PAIN DESCRIPTION - ORIENTATION: ORIENTATION: LOWER;MID

## 2025-05-28 NOTE — PROGRESS NOTES
[facility]  Department of Pulmonary, Critical Care and Sleep Medicine  [facility]  Department of Internal Medicine  Progress Note    SUBJECTIVE:    No CP,   SOB improved  Cr improved    OBJECTIVE:  Vitals:    05/28/25 0400 05/28/25 0541 05/28/25 0802 05/28/25 1006   BP: 128/68  (!) 84/51 (!) 127/54   Pulse: 80  66 64   Resp: 18  18    Temp: 98 °F (36.7 °C)  96.9 °F (36.1 °C)    TempSrc: Oral  Temporal    SpO2: 97%  98%    Weight:  85.4 kg (188 lb 4.4 oz)     Height:         Constitutional: Alert,     EENT: EOMI ESTRELLA. MMM. No icterus. No thrush.     Neck: No thyromegaly. No elevated JVP. Trachea was midline.   Respiratory: Symmetrical.  Breath sounds were clear.    Cardiovascular: Regular, No murmur. No rubs.      Pulses:  Equal bilaterally.    Abdomen: Soft without organomegaly. No rebound, rigidity.  No guarding.  Lymphatic: No lymphadenopathy.  Musculoskeletal: Without weakness or gross deficits  Extremities:  No lower extremity edema. Reflexes appear adequate.   Skin:  Warm and dry.  No skin rashes.   Neurological/Psychiatric: No acute psychosis. Cranial nerves are intact.        DATA:  The data collected below information that was obtained, reviewed, analyzed and interpreted today. Imaging test are reviewed with the radiologist during weekly conference rounds. Comparison to previous images are always explored.     Monitor Strips:  My interpretation and reviewed of the cardiac monitor and further discussion with technical team reveals no changes noted and the patient is in sinus rhythm     RADIOLOGY:  My interpretation and review of the current films reveals  congestion      CBC:   Lab Results   Component Value Date/Time    WBC 11.9 05/28/2025 04:58 AM    RBC 2.89 05/28/2025 04:58 AM    HGB 8.5 05/28/2025 04:58 AM    HCT 27.5 05/28/2025 04:58 AM    MCV 95.2 05/28/2025 04:58 AM    MCH 29.4 05/28/2025 04:58 AM    MCHC 30.9 05/28/2025 04:58 AM    RDW  19.3 05/28/2025 04:58 AM     05/28/2025 04:58 AM    MPV 10.0 05/28/2025 04:58 AM     CMP:    Lab Results   Component Value Date/Time     05/28/2025 04:58 AM    K 4.1 05/28/2025 04:58 AM    K 5.1 01/08/2023 05:35 AM    CL 94 05/28/2025 04:58 AM    CO2 30 05/28/2025 04:58 AM    BUN 40 05/28/2025 04:58 AM    CREATININE 1.7 05/28/2025 04:58 AM    GFRAA >60 10/03/2022 10:55 AM    LABGLOM 42 05/28/2025 04:58 AM    LABGLOM 60 04/19/2024 10:45 AM    GLUCOSE 146 05/28/2025 04:58 AM    GLUCOSE 133 04/18/2012 08:43 AM    CALCIUM 9.2 05/28/2025 04:58 AM    BILITOT 0.7 05/28/2025 04:58 AM    ALKPHOS 97 05/28/2025 04:58 AM    AST 27 05/28/2025 04:58 AM    ALT 26 05/28/2025 04:58 AM       CLINICAL ASSESMENT: Wander Rocha is a 69-year-old male known to us, past medical history CKD stage 3b probably 2/2 nephrosclerosis and previous episode of ischemic ATN requiring temporary HD in May 2017, recurrent episode of ischemic ATN in March 2022 also requiring HD x2 with fair recovery of renal function, baseline creatinine 2.0 mg/dL, HTN, DM type II, A. fib on apixaban, HFpEF 50-55%, NIKO, pacemaker placement, hypothyroidism, hyperlipidemia, who was recently discharged and readmitted on 5/14/2025 for abnormal labs. Lab work showed sodium 130, potassium 5.5 mmol/L, , creatinine 4.9 mL grams per deciliter, proBNP 3981, reason for this consultation. Chest x-ray shows bilateral pleural effusions. Significant home medications include Jardiance, acetazolamide, Bumex, lisinopril and metoprolol.   Persistent atrial fibrillation, on apixaban  Pacemaker insertion 4/23/2025  NIKO  Hypothyroidism, on levothyroxine  Hyperlipidemia, on atorvastatin  Type II DM, on DKA protocol  Persistent atrial fibrillation, [id number] Score: 4  Sinus node disorder s/p dual chamber pacemaker insertion (04/23/2025) s/p CRT P (04/23/2025)  Elevated troponin likely secondary to demand ischemia, RADHA  Hypertension  Hyperlipidemia  Mild oropharyngeal

## 2025-05-28 NOTE — PROGRESS NOTES
[facility]  Internal Medicine Residency Program  House Team Progress Note      Patient:  Wander Rocha 69 y.o. male MRN: [id number] : 1955   Admitted:  2025  2:21 PM    Date of Service:  2025    Room: [address] [place] [address]-A    Chief complaint: had concerns including Abnormal Lab (Sent in from [facility] for elevated CR).     The patient is a 69 y.o. male,  has a past medical history of RADHA (acute kidney injury), Atrial fibrillation (HCC), Carpal tunnel syndrome, Colorectal polyps, Diverticula of colon, Encounter regarding vascular access for dialysis for ESRD (HCC), Hyperlipidemia, Hypertension, Hypothyroidism, Lung nodule, Lung nodules, Nocturnal hypoxemia due to obesity, Obesity, NIKO (obstructive sleep apnea), Osteoarthritis, Pacemaker, Pacemaker, Pinched nerve, Restrictive lung disease secondary to obesity, S/P laminectomy with spinal fusion, Sinus node dysfunction (HCC), Skin lesion of face, and Type II or unspecified type diabetes mellitus without mention of complication, not stated as uncontrolled., had concerns including Abnormal Lab (Sent in from [facility] for elevated CR)., actively being managed for acute decompensated heart failure and acute renal failure    Subjective     Interval History:  He presented to the ED with acute onset shortness of breath, increasing oxygen requirements, bilateral leg swelling, and oliguria over the past 24 hours. He was transferred from his [facility] facility due to concerns for RADHA. Notably, he had a similar admission in 2025 for the same complaints, during which he was treated with a Bumex drip and discharged on oral bumex. On arrival, he was hypotensive, requiring 500mL fluid bolus, and was placed on 3L/min via nasal cannula. Initial labs were pertinent for hyponatremia (130), elevated creatinine (4.9), elevated proBNP (3981) and troponin(61), and low hemoglobin (8.0). CXR demonstrated bilateral pulmonary edema and pulmonary

## 2025-05-28 NOTE — DISCHARGE INSTR - COC
Continuity of Care Form    Patient Name: Wander Rocha   :  1955  MRN:  [id number]    Admit date:  2025  Discharge date:  25    Code Status Order: Full Code   Advance Directives:    Date/Time Healthcare Directive Type of Healthcare Directive Copy in Chart Healthcare Agent Appointed Healthcare Agent's Name Healthcare Agent's Phone Number    25 0610 Yes, patient has an advance directive for healthcare treatment  Durable power of  for health care;Living will  Yes, copy in chart  Spouse  --  --             Admitting Physician:  Martell Rosenberg MD  PCP: Oly Crespo MD    Discharging Nurse: kandy castano  Discharging Hospital Unit/Room#: 8417/8417-A  Discharging Unit Phone Number: [phone]    Emergency Contact:   Extended Emergency Contact Information  Primary Emergency Contact: Petra Rocha  Address: [address] [place] [address]  Home Phone: [phone]  Work Phone: [phone]  Mobile Phone: [phone]  Relation: Spouse  Secondary Emergency Contact: Rubina Kraus   [facility]  Home Phone: [phone]  Relation: Child    Past Surgical History:  Past Surgical History:   Procedure Laterality Date    BACK SURGERY      [facility]. Pinched nerve in back    BACK SURGERY  2017    BACK SURGERY N/A 2022    EXCISON OF SOFT TISSUE NEOPLASM OF UPPER BACK performed by Santana Maurer MD at [facility]    BACK SURGERY Left 2023    BACK LESION EXCISION SKIN GRAFT performed by Santana Maurer MD at [facility]    COLONOSCOPY      multiple colon polyps    COLONOSCOPY  2012    COLONOSCOPY    COLONOSCOPY  2022    polyp; diverticula--sarah    COLONOSCOPY N/A 2022    COLONOSCOPY POLYPECTOMY HOT BIOPSY (Snare) performed by Santana Maurer MD at [facility]    EP DEVICE PROCEDURE N/A 2025    Venogram performed by Elsie Pereira MD at [facility]    EP DEVICE PROCEDURE N/A 2025     Biventricular pacemaker upgrade performed by Elsie Pereira MD at [facility] CARDIAC CATH LAB    EYE SURGERY      lennie cataracts implant    HERNIA REPAIR      umbilical    PACEMAKER PLACEMENT  10/03/2017    Medvanessa dual chamber    Dr. Vizcarra    ROTATOR CUFF REPAIR Right        Immunization History:   Immunization History   Administered Date(s) Administered    COVID-19, MODERNA BLUE border, Primary or Immunocompromised, (age 12y+), IM, 100 mcg/0.5mL 02/26/2021, 03/26/2021, 12/21/2021    COVID-19, PFIZER, 2024/25, (age 12y+), IM, 30mcg/0.3mL 10/23/2023, 11/15/2024    Influenza 11/26/2013    Influenza Virus Vaccine 10/21/2010, 11/26/2013, 10/28/2014, 10/14/2015, 09/21/2016, 10/30/2017, 08/25/2020    Influenza, AFLURIA, FLUZONE, (age2 y+), IM, Trivalent MDV, 0.5mL 09/21/2016    Influenza, FLUAD, (age 65 y+), IM, Quadv, 0.5mL 08/25/2020, 10/11/2022, 10/23/2023    Influenza, FLUAD, (age 65 y+), IM, Trivalent PF, 0.5mL 10/18/2018, 10/15/2019    Influenza, FLUARIX, FLULAVAL, FLUZONE (age 6 mo+) and AFLURIA, (age 3 y+), Quadv PF, 0.5mL 10/01/2018, 08/25/2020    Influenza, FLUZONE High Dose (age 65 y+), IM, Quadv, 0.7mL 10/11/2021    Influenza, Quadv, 6 mo and older, IM (Fluzone, Flulaval) 10/30/2017    Pneumococcal Conjugate 7-valent (Prevnar7) 08/10/2007    Pneumococcal, PCV-13, PREVNAR 13, (age 6w+), IM, 0.5mL 02/03/2017    Pneumococcal, PPSV23, PNEUMOVAX 23, (age 2y+), SC/IM, 0.5mL 08/08/2012, 08/27/2018, 10/01/2018    TDaP, ADACEL (age 10y-64y), BOOSTRIX (age 10y+), IM, 0.5mL 09/21/2016, 06/19/2024    Td, unspecified formulation 01/01/2005    Zoster Live (Zostavax) 10/23/2015    Zoster Recombinant (Shingrix) 11/19/2022, 05/16/2023       Active Problems:  Patient Active Problem List   Diagnosis Code    Poorly controlled type 2 diabetes mellitus (HCC) E11.65    Class 2 obesity due to excess calories with serious comorbidity and body mass index (BMI) of 38.0 to 38.9 in adult [id number]    Hyperlipidemia E78.5    Essential

## 2025-05-28 NOTE — PROGRESS NOTES
Pt got a full bath, CHG wipes, all new linen provided. External catheter changed. Leads and tele stickers also changed, patient comfortable at present, call light in reach.

## 2025-05-28 NOTE — PROGRESS NOTES
Department of Internal Medicine  Infectious Diseases  Progress  Note      C/C :   Leukocytosis , Klebsiella bacteriuria       Pt is awake   Feels better   Afebrile         Current Facility-Administered Medications   Medication Dose Route Frequency Provider Last Rate Last Admin    [START ON 5/30/2025] metOLazone (ZAROXOLYN) tablet 5 mg  5 mg Oral Once per day on Monday Wednesday Friday French, [facility], APRN - CNP        insulin lispro (HUMALOG,ADMELOG) injection vial 0-6 Units  0-6 Units SubCUTAneous 4x Daily AC & HS Luiz Luciano MD   2 Units at 05/28/25 1205    levothyroxine (SYNTHROID) tablet 150 mcg  150 mcg Oral Daily Luiz Luciano MD   150 mcg at 05/27/25 0601    heparin (porcine) injection 3,200 Units  3,200 Units IntraCATHeter PRN Manoj Chavez MD   3,200 Units at 05/20/25 1142    midodrine (PROAMATINE) tablet 20 mg  20 mg Oral TID  Benjie Melvin MD   20 mg at 05/28/25 1205    lidocaine 4 % external patch 1 patch  1 patch TransDERmal Daily Benjie Melvin MD   1 patch at 05/28/25 0843    dextrose bolus 10% 125 mL  125 mL IntraVENous PRN Niki Hirsch MD        Or    dextrose bolus 10% 250 mL  250 mL IntraVENous PRN Niki Hirsch MD        magnesium sulfate 2000 mg in 50 mL IVPB premix  2,000 mg IntraVENous PRN Niki Hirsch MD        sodium phosphate 15 mmol in sodium chloride 0.9 % 250 mL IVPB  15 mmol IntraVENous PRN Niki Hirsch MD        dextrose 10 % infusion   IntraVENous Continuous PRN Niki Hirsch MD        [Held by provider] lactulose (CHRONULAC) 10 GM/15ML solution 20 g  20 g Oral TID Elijah Rios MD        [Held by provider] rifAXIMin (XIFAXAN) tablet 400 mg  400 mg Oral TID Elijah Rios MD        0.9 % sodium chloride infusion   IntraVENous PRN Lizzy Barbosa MD        ipratropium 0.5 mg-albuterol 2.5 mg (DUONEB) nebulizer solution 1 Dose  1 Dose Inhalation Q4H WA RT Mike Alejandro MD   1 Dose at 05/27/25 2013    [Held by provider] sevelamer

## 2025-05-28 NOTE — PROGRESS NOTES
Occupational Therapy  OT BEDSIDE TREATMENT NOTE   [facility]  [address] [place], [place]        Date:2025  Patient Name: Wander Rocha  MRN: [id number]  : 1955  Room: [address] [place] [address]-A     Per OT Eval:    Evaluating OT: Dianna Goff, ROSEANND,  OTR/L; [id number]     Referring Provider: Martell Rosenberg MD    Specific Provider Orders/Date: OT eval and treat (25)        Diagnosis: RADHA (acute kidney injury) [N17.9]      Reason for admission: Pt admitted with concerns of acute kidney injury.  He also complains of acute onset of shortness of breath with increasing oxygen demand, and increasing bilateral leg swelling.      Surgery/Procedures: : chest tube     Pertinent Medical History:    Past Medical History        Past Medical History:   Diagnosis Date    RADHA (acute kidney injury) 03/10/2022    Atrial fibrillation (HCC)      Carpal tunnel syndrome      Colorectal polyps 04/15/2013    Diverticula of colon 04/15/2013    Encounter regarding vascular access for dialysis for ESRD (HCC) 2022    Hyperlipidemia      Hypertension      Hypothyroidism      Lung nodule      Lung nodules 04/15/2013    Nocturnal hypoxemia due to obesity 2013    Obesity      NIKO (obstructive sleep apnea) 2013     [facility]    Osteoarthritis      Pacemaker       DOI 10/3/2017 Medtronic; dual chamber; MRI conditional  Indication: Sinus node dysfunction     Pacemaker       DOI 10/3/2017  Medtronic; dual chamber; MRI conditional   Indication: Sinus node dysfunction       Pinched nerve       Lumbar    Restrictive lung disease secondary to obesity 2016    S/P laminectomy with spinal fusion 04/15/2013     Done at [facility], 2012     Sinus node dysfunction (HCC)      Skin lesion of face 2022    Type II or unspecified type diabetes mellitus without mention of complication, not stated as uncontrolled              *Precautions:  Fall

## 2025-05-28 NOTE — PROGRESS NOTES
Department of General Surgery - Adult  Surgical Service GS  Attending Progress Note      SUBJECTIVE:  alert questionable orientation    OBJECTIVE  improving hemodynamics and chemistry    Physical    VITALS:  BP (!) 127/54   Pulse 64   Temp 96.9 °F (36.1 °C) (Temporal)   Resp 18   Ht 1.702 m (5' 7.01\")   Wt 85.4 kg (188 lb 4.4 oz)   SpO2 98%   BMI 29.48 kg/m²   INTAKE/OUTPUT:    Intake/Output Summary (Last 24 hours) at 2025 1207  Last data filed at 2025 1029  Gross per 24 hour   Intake 180 ml   Output 2000 ml   Net -1820 ml     TEMPERATURE:  Current - Temp: 96.9 °F (36.1 °C); Max - Temp  Av.6 °F (36.4 °C)  Min: 96.9 °F (36.1 °C)  Max: 98 °F (36.7 °C)  RESPIRATIONS RANGE: Resp  Av  Min: 18  Max: 18  PULSE RANGE: Pulse  Av.6  Min: 64  Max: 82  BLOOD PRESSURE RANGE:  Systolic (24hrs), Av , Min:84 , Max:132  ; Diastolic (24hrs), Av, Min:51, Max:68   PULSE OXIMETRY RANGE: SpO2  Av.5 %  Min: 95 %  Max: 98 %  CONSTITUTIONAL:  fatigued, alert, cooperative, no apparent distress, appears older than stated age, and normal weight  EYES:  lids and lashes normal, pupils equal, round and reactive to light, and extra-ocular muscles intact  NECK:  supple, symmetrical, trachea midline, skin normal, and no stridor  LUNGS:  no increased work of breathing, moderate air exchange, no retractions, and diminished breath sounds right base and left base  CARDIOVASCULAR:  normal apical pulses, regularly irregular rhythm, and normal S1 and S2  ABDOMEN:  normal bowel sounds, soft, non-distended, non-tender, voluntary guarding absent, and no masses palpated  Data  CBC with Differential:    Lab Results   Component Value Date/Time    WBC 11.9 2025 04:58 AM    RBC 2.89 2025 04:58 AM    HGB 8.5 2025 04:58 AM    HCT 27.5 2025 04:58 AM     2025 04:58 AM    MCV 95.2 2025 04:58 AM    MCH 29.4 2025 04:58 AM    MCHC 30.9 2025 04:58 AM    RDW 19.3 2025  04:58 AM    NRBC 1 05/26/2025 04:56 AM    METASPCT 4 05/24/2025 04:45 AM    LYMPHOPCT 8 05/28/2025 04:58 AM    PROMYELOPCT 2 05/21/2025 04:23 AM    MONOPCT 11 05/28/2025 04:58 AM    MYELOPCT 2 05/25/2025 04:48 AM    EOSPCT 3 05/28/2025 04:58 AM    BASOPCT 1 05/28/2025 04:58 AM    MONOSABS 1.32 05/28/2025 04:58 AM    LYMPHSABS 0.98 05/28/2025 04:58 AM    EOSABS 0.35 05/28/2025 04:58 AM    BASOSABS 0.07 05/28/2025 04:58 AM     CMP:    Lab Results   Component Value Date/Time     05/28/2025 04:58 AM    K 4.1 05/28/2025 04:58 AM    K 5.1 01/08/2023 05:35 AM    CL 94 05/28/2025 04:58 AM    CO2 30 05/28/2025 04:58 AM    BUN 40 05/28/2025 04:58 AM    CREATININE 1.7 05/28/2025 04:58 AM    GFRAA >60 10/03/2022 10:55 AM    LABGLOM 42 05/28/2025 04:58 AM    LABGLOM 60 04/19/2024 10:45 AM    GLUCOSE 146 05/28/2025 04:58 AM    GLUCOSE 133 04/18/2012 08:43 AM    CALCIUM 9.2 05/28/2025 04:58 AM    BILITOT 0.7 05/28/2025 04:58 AM    ALKPHOS 97 05/28/2025 04:58 AM    AST 27 05/28/2025 04:58 AM    ALT 26 05/28/2025 04:58 AM       Current Inpatient Medications    Current Facility-Administered Medications: [START ON 5/30/2025] metOLazone (ZAROXOLYN) tablet 5 mg, 5 mg, Oral, Once per day on Monday Wednesday Friday  insulin lispro (HUMALOG,ADMELOG) injection vial 0-6 Units, 0-6 Units, SubCUTAneous, 4x Daily AC & HS  levothyroxine (SYNTHROID) tablet 150 mcg, 150 mcg, Oral, Daily  heparin (porcine) injection 3,200 Units, 3,200 Units, IntraCATHeter, PRN  midodrine (PROAMATINE) tablet 20 mg, 20 mg, Oral, TID WC  lidocaine 4 % external patch 1 patch, 1 patch, TransDERmal, Daily  dextrose bolus 10% 125 mL, 125 mL, IntraVENous, PRN **OR** dextrose bolus 10% 250 mL, 250 mL, IntraVENous, PRN  magnesium sulfate 2000 mg in 50 mL IVPB premix, 2,000 mg, IntraVENous, PRN  sodium phosphate 15 mmol in sodium chloride 0.9 % 250 mL IVPB, 15 mmol, IntraVENous, PRN  dextrose 10 % infusion, , IntraVENous, Continuous PRN  [Held by provider] lactulose

## 2025-05-28 NOTE — PLAN OF CARE
Problem: Safety - Adult  Goal: Free from fall injury  5/28/2025 0929 by Dora Alvarez RN  Outcome: Progressing  5/28/2025 0659 by Iwona Chacon RN  Outcome: Progressing     Problem: Chronic Conditions and Co-morbidities  Goal: Patient's chronic conditions and co-morbidity symptoms are monitored and maintained or improved  5/28/2025 0929 by Dora Alvarez RN  Outcome: Progressing  5/28/2025 0659 by Iwona Chacon RN  Outcome: Progressing     Problem: Discharge Planning  Goal: Discharge to home or other facility with appropriate resources  5/28/2025 0929 by Dora Alvarez RN  Outcome: Progressing  5/28/2025 0659 by Iwona Chacon RN  Outcome: Progressing     Problem: Skin/Tissue Integrity  Goal: Skin integrity remains intact  Description: 1.  Monitor for areas of redness and/or skin breakdown2.  Assess vascular access sites hourly3.  Every 4-6 hours minimum:  Change oxygen saturation probe site4.  Every 4-6 hours:  If on nasal continuous positive airway pressure, respiratory therapy assess nares and determine need for appliance change or resting period  5/28/2025 0929 by Dora Alvarez RN  Outcome: Progressing  5/28/2025 0659 by Iwona Chacon RN  Outcome: Progressing     Problem: ABCDS Injury Assessment  Goal: Absence of physical injury  5/28/2025 0929 by Dora Alvarez RN  Outcome: Progressing  5/28/2025 0659 by Iwona Chacon RN  Outcome: Progressing     Problem: Pain  Goal: Verbalizes/displays adequate comfort level or baseline comfort level  5/28/2025 0929 by Dora Alvarez RN  Outcome: Progressing  5/28/2025 0659 by Iwona Chacon RN  Outcome: Progressing     Problem: Neurosensory - Adult  Goal: Achieves stable or improved neurological status  5/28/2025 0929 by Dora Alvarez RN  Outcome: Progressing  5/28/2025 0659 by Iwona Chacon RN  Outcome: Progressing     Problem: Respiratory - Adult  Goal: Achieves optimal ventilation and oxygenation  5/28/2025 0929 by Dora Alvarez RN  Outcome:  Progressing  5/28/2025 0659 by Iwona Chacon RN  Outcome: Progressing     Problem: Cardiovascular - Adult  Goal: Maintains optimal cardiac output and hemodynamic stability  5/28/2025 0929 by Dora Alvarez RN  Outcome: Progressing  5/28/2025 0659 by Iwona Chcaon RN  Outcome: Progressing     Problem: Nutrition Deficit:  Goal: Optimize nutritional status  5/28/2025 0929 by Dora Alvarez RN  Outcome: Progressing  5/28/2025 0659 by Iwona Chacon RN  Outcome: Progressing

## 2025-05-28 NOTE — CARE COORDINATION
CM update note.  PD catheter placement cancelled today by Nephrology.  IR consulted to removed tunneled dialysis catheter.  PT/OT updates today.  Spoke with patient wife via phone.  She is requesting referral to [facility].  Referral sent via [employer].      1215:  [facility] has accepted and will start precert once update therapy notes are in.  [place]/destination completed.  Ambulance form with envelope placed on the soft chart.  CM/SW to follow.  Milton Ramos RN -[phone].

## 2025-05-28 NOTE — PROGRESS NOTES
A  Stand pivot: ModA WW Sit to stand: Min A  Stand to sit: Min A  Stand pivot: Min A with AAD   Ambulation    Few side steps with FWW Mod A Side stepped at EOB with HHA ModA >50 feet with AAD Min A   Stair negotiation: ascended and descended  NT NT >1 steps with B rail Mod A   ROM BUE:  Per OT eval   BLE:  WFL     Strength BUE:  Per OT eval   BLE:  grossly 4/5     Balance Sitting EOB:  SBA  Dynamic Standing:  Mod A with FWW  Sitting EOB:  Supervision  Dynamic Standing:  Min A with FWW   Pt is A & O x 4  Sensation:  Pt denies numbness and tingling to extremities  Edema:  unremarkable     Vitals:  SPO2 and HR were stable throughout session.    Patient education  Pt educated on role of PT and safety with functional mobility     Patient response to education:   Pt verbalized understanding Pt demonstrated skill Pt requires further education in this area   x x x     ASSESSMENT:    Comments:  Medically cleared for session by RN. Pt was in bed upon PT entry and agreeable to participate. transferred to EOB with assist for trunk. Once sitting EOB demonstrated fair balance, but reported mild dizziness. Symptoms subsided with time. Completed sit<>stand transfer  with lift assist and cues for hand positioning. Decreased assist required with second rep. Side stepped at EOB with HHA with slowed pace and decreased balance. Provided WW and completed stand pivot transfer to bedside chair with slowed pace and decreased balance. Noted slight posterior LOB requiring assist to correct. Positioned in chair with all needs met and call light in reach.     Treatment:  Patient practiced and was instructed in the following treatment:    Bed mobility training - pt given verbal and tactile cues to facilitate proper sequencing and safety during rolling and supine>sit as well as provided with physical assistance to complete task   Sitting EOB for >10 minutes for upright tolerance, postural awareness and BLE ROM  Transfer training - pt was given  verbal and tactile cues to facilitate proper hand placement, technique and safety during sit to stand and stand to sit as well as provided with physical assistance.  Skilled positioning - Pt placed in the chair with pillows utilized to facilitate upright posture, joint and skin integrity, and interaction with environment.     Skilled monitoring of vitals throughout session.     PLAN:    Patient is making fair progress towards established goals.  Will continue with current POC.      Time in  1200  Time out  1225    Total Treatment Time  25 minutes     CPT codes:  [] Gait training 40278 0 minutes  [] Manual therapy 54834 0 minutes  [x] Therapeutic activities 81962 25 minutes  [] Therapeutic exercises 99706 0 minutes  [] Neuromuscular reeducation 91929 0 minutes    Nydia Rust PT, DPT  [id number]

## 2025-05-28 NOTE — PLAN OF CARE
Problem: Safety - Adult  Goal: Free from fall injury  Outcome: Progressing     Problem: Chronic Conditions and Co-morbidities  Goal: Patient's chronic conditions and co-morbidity symptoms are monitored and maintained or improved  Outcome: Progressing     Problem: Discharge Planning  Goal: Discharge to home or other facility with appropriate resources  Outcome: Progressing     Problem: Skin/Tissue Integrity  Goal: Skin integrity remains intact  Description: 1.  Monitor for areas of redness and/or skin breakdown2.  Assess vascular access sites hourly3.  Every 4-6 hours minimum:  Change oxygen saturation probe site4.  Every 4-6 hours:  If on nasal continuous positive airway pressure, respiratory therapy assess nares and determine need for appliance change or resting period  Outcome: Progressing     Problem: ABCDS Injury Assessment  Goal: Absence of physical injury  Outcome: Progressing     Problem: Pain  Goal: Verbalizes/displays adequate comfort level or baseline comfort level  Outcome: Progressing

## 2025-05-29 VITALS
SYSTOLIC BLOOD PRESSURE: 120 MMHG | DIASTOLIC BLOOD PRESSURE: 54 MMHG | TEMPERATURE: 98.2 F | HEIGHT: 67 IN | BODY MASS INDEX: 29.41 KG/M2 | HEART RATE: 65 BPM | OXYGEN SATURATION: 100 % | WEIGHT: 187.39 LBS | RESPIRATION RATE: 24 BRPM

## 2025-05-29 LAB
ALBUMIN SERPL-MCNC: 3.4 G/DL (ref 3.5–5.2)
ALP SERPL-CCNC: 97 U/L (ref 40–129)
ALT SERPL-CCNC: 24 U/L (ref 0–50)
ANION GAP SERPL CALCULATED.3IONS-SCNC: 13 MMOL/L (ref 7–16)
AST SERPL-CCNC: 27 U/L (ref 0–50)
BASOPHILS # BLD: 0.06 K/UL (ref 0–0.2)
BASOPHILS NFR BLD: 1 % (ref 0–2)
BILIRUB SERPL-MCNC: 0.7 MG/DL (ref 0–1.2)
BUN SERPL-MCNC: 43 MG/DL (ref 8–23)
CALCIUM SERPL-MCNC: 8.9 MG/DL (ref 8.8–10.2)
CHLORIDE SERPL-SCNC: 91 MMOL/L (ref 98–107)
CO2 SERPL-SCNC: 30 MMOL/L (ref 22–29)
CREAT SERPL-MCNC: 1.7 MG/DL (ref 0.7–1.2)
EOSINOPHIL # BLD: 0.3 K/UL (ref 0.05–0.5)
EOSINOPHILS RELATIVE PERCENT: 3 % (ref 0–6)
ERYTHROCYTE [DISTWIDTH] IN BLOOD BY AUTOMATED COUNT: 18.6 % (ref 11.5–15)
GFR, ESTIMATED: 43 ML/MIN/1.73M2
GLUCOSE BLD-MCNC: 106 MG/DL (ref 74–99)
GLUCOSE BLD-MCNC: 200 MG/DL (ref 74–99)
GLUCOSE SERPL-MCNC: 104 MG/DL (ref 74–99)
HCT VFR BLD AUTO: 27.6 % (ref 37–54)
HGB BLD-MCNC: 8.6 G/DL (ref 12.5–16.5)
IMM GRANULOCYTES # BLD AUTO: 0.11 K/UL (ref 0–0.58)
IMM GRANULOCYTES NFR BLD: 1 % (ref 0–5)
LYMPHOCYTES NFR BLD: 0.97 K/UL (ref 1.5–4)
LYMPHOCYTES RELATIVE PERCENT: 11 % (ref 20–42)
MAGNESIUM SERPL-MCNC: 1.5 MG/DL (ref 1.6–2.4)
MCH RBC QN AUTO: 29.7 PG (ref 26–35)
MCHC RBC AUTO-ENTMCNC: 31.2 G/DL (ref 32–34.5)
MCV RBC AUTO: 95.2 FL (ref 80–99.9)
MONOCYTES NFR BLD: 1.21 K/UL (ref 0.1–0.95)
MONOCYTES NFR BLD: 13 % (ref 2–12)
NEUTROPHILS NFR BLD: 71 % (ref 43–80)
NEUTS SEG NFR BLD: 6.62 K/UL (ref 1.8–7.3)
PHOSPHATE SERPL-MCNC: 3.8 MG/DL (ref 2.5–4.5)
PLATELET # BLD AUTO: 157 K/UL (ref 130–450)
PMV BLD AUTO: 10.1 FL (ref 7–12)
POTASSIUM SERPL-SCNC: 3.9 MMOL/L (ref 3.5–5.1)
PROT SERPL-MCNC: 6.7 G/DL (ref 6.4–8.3)
RBC # BLD AUTO: 2.9 M/UL (ref 3.8–5.8)
SODIUM SERPL-SCNC: 134 MMOL/L (ref 136–145)
WBC OTHER # BLD: 9.3 K/UL (ref 4.5–11.5)

## 2025-05-29 PROCEDURE — 6370000000 HC RX 637 (ALT 250 FOR IP)

## 2025-05-29 PROCEDURE — 84100 ASSAY OF PHOSPHORUS: CPT

## 2025-05-29 PROCEDURE — 83735 ASSAY OF MAGNESIUM: CPT

## 2025-05-29 PROCEDURE — 94660 CPAP INITIATION&MGMT: CPT

## 2025-05-29 PROCEDURE — 85025 COMPLETE CBC W/AUTO DIFF WBC: CPT

## 2025-05-29 PROCEDURE — 6360000002 HC RX W HCPCS

## 2025-05-29 PROCEDURE — 80053 COMPREHEN METABOLIC PANEL: CPT

## 2025-05-29 PROCEDURE — 99239 HOSP IP/OBS DSCHRG MGMT >30: CPT | Performed by: INTERNAL MEDICINE

## 2025-05-29 PROCEDURE — 94640 AIRWAY INHALATION TREATMENT: CPT

## 2025-05-29 PROCEDURE — 6370000000 HC RX 637 (ALT 250 FOR IP): Performed by: INTERNAL MEDICINE

## 2025-05-29 PROCEDURE — 99232 SBSQ HOSP IP/OBS MODERATE 35: CPT | Performed by: INTERNAL MEDICINE

## 2025-05-29 PROCEDURE — 36415 COLL VENOUS BLD VENIPUNCTURE: CPT

## 2025-05-29 PROCEDURE — 2700000000 HC OXYGEN THERAPY PER DAY

## 2025-05-29 PROCEDURE — 82962 GLUCOSE BLOOD TEST: CPT

## 2025-05-29 RX ORDER — MIDODRINE HYDROCHLORIDE 10 MG/1
20 TABLET ORAL
Qty: 90 TABLET | Refills: 3 | Status: CANCELLED | OUTPATIENT
Start: 2025-05-29

## 2025-05-29 RX ORDER — METOLAZONE 5 MG/1
5 TABLET ORAL
Qty: 12 TABLET | Refills: 3 | Status: CANCELLED | OUTPATIENT
Start: 2025-05-30

## 2025-05-29 RX ORDER — METOPROLOL SUCCINATE 25 MG/1
12.5 TABLET, EXTENDED RELEASE ORAL DAILY
DISCHARGE
Start: 2025-05-30

## 2025-05-29 RX ORDER — INSULIN LISPRO 100 [IU]/ML
0-6 INJECTION, SOLUTION INTRAVENOUS; SUBCUTANEOUS
DISCHARGE
Start: 2025-05-29 | End: 2025-05-29

## 2025-05-29 RX ORDER — LEVOTHYROXINE SODIUM 150 UG/1
150 TABLET ORAL DAILY
Qty: 30 TABLET | Refills: 3 | Status: CANCELLED | OUTPATIENT
Start: 2025-05-29

## 2025-05-29 RX ORDER — METOLAZONE 5 MG/1
5 TABLET ORAL
DISCHARGE
Start: 2025-05-30

## 2025-05-29 RX ORDER — ASPIRIN 81 MG/1
81 TABLET ORAL DAILY
DISCHARGE
Start: 2025-05-30

## 2025-05-29 RX ORDER — INSULIN LISPRO 100 [IU]/ML
0-6 INJECTION, SOLUTION INTRAVENOUS; SUBCUTANEOUS
DISCHARGE
Start: 2025-05-29

## 2025-05-29 RX ORDER — CHOLECALCIFEROL (VITAMIN D3) 50 MCG
2000 TABLET ORAL DAILY
DISCHARGE
Start: 2025-05-30

## 2025-05-29 RX ORDER — METOPROLOL SUCCINATE 25 MG/1
12.5 TABLET, EXTENDED RELEASE ORAL DAILY
Qty: 30 TABLET | Refills: 3 | Status: CANCELLED | OUTPATIENT
Start: 2025-05-29

## 2025-05-29 RX ORDER — LANOLIN ALCOHOL/MO/W.PET/CERES
400 CREAM (GRAM) TOPICAL ONCE
Status: COMPLETED | OUTPATIENT
Start: 2025-05-29 | End: 2025-05-29

## 2025-05-29 RX ORDER — MIDODRINE HYDROCHLORIDE 10 MG/1
20 TABLET ORAL
DISCHARGE
Start: 2025-05-29

## 2025-05-29 RX ORDER — LEVOTHYROXINE SODIUM 150 UG/1
150 TABLET ORAL DAILY
DISCHARGE
Start: 2025-05-30

## 2025-05-29 RX ORDER — ASPIRIN 81 MG/1
81 TABLET ORAL DAILY
Qty: 30 TABLET | Refills: 3 | Status: CANCELLED | OUTPATIENT
Start: 2025-05-29

## 2025-05-29 RX ORDER — ATORVASTATIN CALCIUM 40 MG/1
40 TABLET, FILM COATED ORAL DAILY
Qty: 30 TABLET | Refills: 3 | Status: CANCELLED | OUTPATIENT
Start: 2025-05-29

## 2025-05-29 RX ORDER — CHOLECALCIFEROL (VITAMIN D3) 50 MCG
2000 TABLET ORAL DAILY
Qty: 30 TABLET | Refills: 2 | Status: CANCELLED | OUTPATIENT
Start: 2025-05-29

## 2025-05-29 RX ORDER — ATORVASTATIN CALCIUM 40 MG/1
40 TABLET, FILM COATED ORAL DAILY
DISCHARGE
Start: 2025-05-30

## 2025-05-29 RX ADMIN — MAGNESIUM SULFATE HEPTAHYDRATE 2000 MG: 40 INJECTION, SOLUTION INTRAVENOUS at 12:46

## 2025-05-29 RX ADMIN — IPRATROPIUM BROMIDE AND ALBUTEROL SULFATE 1 DOSE: 2.5; .5 SOLUTION RESPIRATORY (INHALATION) at 07:50

## 2025-05-29 RX ADMIN — SERTRALINE 50 MG: 50 TABLET, FILM COATED ORAL at 09:02

## 2025-05-29 RX ADMIN — INSULIN LISPRO 1 UNITS: 100 INJECTION, SOLUTION INTRAVENOUS; SUBCUTANEOUS at 12:42

## 2025-05-29 RX ADMIN — ATORVASTATIN CALCIUM 40 MG: 40 TABLET, FILM COATED ORAL at 09:05

## 2025-05-29 RX ADMIN — LEVOTHYROXINE SODIUM 150 MCG: 0.1 TABLET ORAL at 05:42

## 2025-05-29 RX ADMIN — MIDODRINE HYDROCHLORIDE 20 MG: 10 TABLET ORAL at 09:05

## 2025-05-29 RX ADMIN — APIXABAN 5 MG: 5 TABLET, FILM COATED ORAL at 09:01

## 2025-05-29 RX ADMIN — BUMETANIDE 2 MG: 1 TABLET ORAL at 09:01

## 2025-05-29 RX ADMIN — SALINE NASAL SPRAY 1 SPRAY: 1.5 SOLUTION NASAL at 09:07

## 2025-05-29 RX ADMIN — Medication 400 MG: at 10:20

## 2025-05-29 RX ADMIN — MIDODRINE HYDROCHLORIDE 20 MG: 10 TABLET ORAL at 12:41

## 2025-05-29 RX ADMIN — ASPIRIN 81 MG: 81 TABLET, COATED ORAL at 09:02

## 2025-05-29 RX ADMIN — Medication 2000 UNITS: at 09:01

## 2025-05-29 RX ADMIN — METOPROLOL SUCCINATE 12.5 MG: 25 TABLET, EXTENDED RELEASE ORAL at 09:01

## 2025-05-29 RX ADMIN — IPRATROPIUM BROMIDE AND ALBUTEROL SULFATE 1 DOSE: 2.5; .5 SOLUTION RESPIRATORY (INHALATION) at 12:02

## 2025-05-29 NOTE — CARE COORDINATION
CM update note.  Per IM resident, patient is medically ready for discharge.  Discharge plan is [facility] pending insurance precert.  Precert started on 5/28.  [place]/destination completed.  Ambulance form with envelope placed on the soft chart.  Tunneled dialysis catheter removed on 5/28. CM/SW to follow.  Milton Ramos RN -[phone].      1300:  precert obtained for [facility].  Discharge order noted.  Transport set up via wheelchair with [facility].   time is 1530.  Nurse will need to call report to [phone].  Facility liaison, patient, patient wife and RN notified of  time.  Milton Ramos RN -[phone].

## 2025-05-29 NOTE — DISCHARGE INSTRUCTIONS
Internal medicine    Follow ups  Please follow up with the internal medicine clinic at [facility].Your appointment should be set after your discharge from the nursing facility.  Please keep all other follow up appointments:  Future Appointments   Date Time Provider Department Center   6/3/2025  9:15 AM [facility] CHF ROOM 3 [facility]   6/16/2025  8:30 AM Henrik Fontanez MD [place] [facility]   7/18/2025 10:00 AM Colin Soni DO [place] [facility]   8/4/2025  9:30 AM Martell Momin, APRN - CNP ELECTRO PHYS [facility]   8/4/2025  9:30 AM SCHEDULE, DEVICE CLINIC 1 [place] [facility]        Changes in healthcare   Please take all medications as indicated  Diet: renal diet   Activity: activity as tolerated and ambulate in house  New Medications started during this hospital stay  Midodrine 20 mg, take 1 tablet three times daily  Epoetin lay 10,000 units, inject to skin every two weeks for 10 months  Take your lispro with sliding scale as instructed below  Changes to your medications  Atorvastatin 40 mg, take 1 tablet once daily  Metoprolol succinate 25 mg, take 0.5 tablet once daily  Medications you should stop taking   Empagliflozin (Jardiance)  Lisinopril  Acetazolamide  Please contact us if you have any concerns, wish to change or make an appointment:  Internal medicine clinic   Phone: [phone]  Fax: [phone]  [facility] [facility] [address]  Should you have further questions in regards to this visit, you can review your clinical note and after visit summary document on your [employer] account.     Other than any new prescriptions given to you today, the list of home medications on this After Visit Summary are based on information provided to us from you and your healthcare providers. This information, including the list, dose, and frequency of medications is only as accurate as the information you provided. If you have any questions or concerns about your home medications, please

## 2025-05-29 NOTE — DISCHARGE SUMMARY
EMERGENCY DEPARTMENT ENCOUNTER    Pt Name: Romy Moore  MRN: [id number]  [place] 1961  Date of evaluation: 3/19/22  CHIEF COMPLAINT       Chief Complaint   Patient presents with    Abdominal Pain     L side     HISTORY OF PRESENT ILLNESS   78-year-old female presents with complaints of left lower quadrant abdominal pain. Patient states that she has been having these intermittent episodes of abdominal pain ongoing over the past few months, with some worsening symptoms over the past few days. She denies nausea or vomiting, she denies dysuria or hematuria and has no fevers or chills. The patient states that her symptoms are severe. REVIEW OF SYSTEMS     Review of Systems   Constitutional: Negative for chills and fever. HENT: Negative for rhinorrhea and sore throat. Eyes: Negative for discharge, redness and visual disturbance. Respiratory: Negative for cough and shortness of breath. Cardiovascular: Negative for chest pain, palpitations and leg swelling. Gastrointestinal: Positive for abdominal pain and nausea. Negative for diarrhea and vomiting. Genitourinary: Negative for dysuria and hematuria. Musculoskeletal: Negative for arthralgias, myalgias and neck pain. Skin: Negative for color change and rash. Neurological: Negative for seizures, weakness and headaches. Psychiatric/Behavioral: Negative for hallucinations, self-injury and suicidal ideas. PASTMEDICAL HISTORY     Past Medical History:   Diagnosis Date    Diabetes mellitus ([facility] [address]     Hypothyroidism      Past Problem List  Patient Active Problem List   Diagnosis Code    Hypothyroidism E03.9    Diarrhea R19.7    Type 2 diabetes mellitus without complication, without long-term current use of insulin ([facility]) E11.9    Acute pain of right knee M25.561    Hyperlipidemia E78.5     SURGICAL HISTORY     History reviewed. No pertinent surgical history.   CURRENT MEDICATIONS       Current Discharge Medication List CONTINUE these medications which have NOT CHANGED    Details   dicyclomine (BENTYL) 10 MG capsule Take 1 capsule by mouth 4 times daily  Qty: 30 capsule, Refills: 1    Associated Diagnoses: Hepatomegaly; Abdominal pain, unspecified abdominal location      potassium chloride (KLOR-CON M) 20 MEQ extended release tablet Take 1 tablet by mouth daily for 10 days  Qty: 10 tablet, Refills: 0    Associated Diagnoses: Hypokalemia      pioglitazone (ACTOS) 30 MG tablet Take 1 tablet by mouth daily  Qty: 30 tablet, Refills: 3    Associated Diagnoses: Type 2 diabetes mellitus without complication, without long-term current use of insulin (HCC)      NP THYROID 60 MG tablet TAKE ONE TABLET BY MOUTH DAILY  Qty: 30 tablet, Refills: 2      ibuprofen (IBU) 600 MG tablet Take 1 tablet by mouth every 8 hours as needed for Pain  Qty: 20 tablet, Refills: 0      Cranberry 1000 MG CAPS Take by mouth      cyclobenzaprine (FLEXERIL) 10 MG tablet Take 1 tablet by mouth 2 times daily as needed for Muscle spasms  Qty: 60 tablet, Refills: 1    Associated Diagnoses: Chronic left-sided low back pain without sciatica      clotrimazole (LOTRIMIN) 1 % cream Apply topically 2 times daily Apply topically 2 times daily.   Qty: 1 each, Refills: 1    Associated Diagnoses: Yeast dermatitis      sitaGLIPtan-metFORMIN (JANUMET)  MG per tablet Take 1 tablet by mouth 2 times daily (with meals)  Qty: 60 tablet, Refills: 5      meloxicam (MOBIC) 15 MG tablet Take 1 tablet by mouth daily  Qty: 30 tablet, Refills: 2    Associated Diagnoses: Chronic left-sided low back pain without sciatica      rosuvastatin (CRESTOR) 10 MG tablet Take 1 tablet by mouth daily  Qty: 30 tablet, Refills: 5    Associated Diagnoses: Hyperlipidemia, unspecified hyperlipidemia type      Elastic Bandages & Supports (NEOPRENE KNEE BRACE) MISC Knee sleeve  Right knee  Qty: 1 each, Refills: 0    Associated Diagnoses: Acute pain of right knee           ALLERGIES     is allergic to confusion. Urine culture growing Klebsiella pneumoniae. CT head and metabolic workup were unremarkable. Zosyn was continued.     He has been stable off pressors, thus, he was transferred to the floor for further monitoring. Antibiotic courses were completed and discontinued. He also had oropharyngeal dysphagia, in which SLP was consulted and recommended soft and bite sized consistency solids and thin liquids.      Nephrology recommended peritoneal dialysis, but as renal function remained stable, was deferred. He was started on epoetin lay 10,000 units scheduled for every 2 weeks, for 10 months. Endocrinology recommends discontinuing Lantus and continuing on low-dose sliding scale since patient has low insulin requirements and has no episodes of hypoglycemia.  Also recommends continuing Synthroid at 150 mg once daily.    On day of discharge :   Subjective:  The patient     Review of Systems   Constitutional:  Negative for chills and fever.   HENT:  Negative for congestion.    Respiratory:  Negative for cough, shortness of breath and wheezing.    Cardiovascular:  Negative for chest pain, palpitations and leg swelling.   Gastrointestinal:  Negative for abdominal pain, constipation, diarrhea, nausea and vomiting.   Genitourinary:  Negative for dysuria, flank pain and frequency.   Musculoskeletal:  Negative for back pain and myalgias.   All other systems reviewed and are negative.           Follow up in Clinic:   To schedule after discharge from skilled nursing facility    Significant findings (history and exam, laboratory, radiological, pathology, other tests):   General Appearance: alert, appears stated age, and cooperative  HEENT:  Head: Normocephalic, no lesions, without obvious abnormality.  Neck: no adenopathy, no carotid bruit, no JVD, supple, symmetrical, trachea midline, and thyroid not enlarged, symmetric, no tenderness/mass/nodules  Lung: clear to auscultation bilaterally  Heart: regular rate and rhythm, S1,  insulins and sulfa antibiotics. FAMILY HISTORY     She indicated that her mother is alive. She indicated that her father is alive. SOCIAL HISTORY       Social History     Tobacco Use    Smoking status: Never Smoker    Smokeless tobacco: Never Used   Vaping Use    Vaping Use: Never used   Substance Use Topics    Alcohol use: Never    Drug use: Never     PHYSICAL EXAM     INITIAL VITALS: BP (!) 148/67   Pulse 100   Temp 98.5 °F (36.9 °C) (Oral)   Resp 14   Ht 5' 5\" (1.651 m)   Wt 210 lb (95.3 kg)   SpO2 100%   BMI 34.95 kg/m²    Physical Exam  Constitutional:       Appearance: Normal appearance. She is well-developed. She is not ill-appearing or toxic-appearing. HENT:      Head: Normocephalic and atraumatic. Eyes:      Conjunctiva/sclera: Conjunctivae normal.      Pupils: Pupils are equal, round, and reactive to light. Neck:      Trachea: Trachea normal.   Cardiovascular:      Rate and Rhythm: Normal rate and regular rhythm. Heart sounds: S1 normal and S2 normal. No murmur heard. Pulmonary:      Effort: Pulmonary effort is normal. No accessory muscle usage or respiratory distress. Breath sounds: Normal breath sounds. Chest:      Chest wall: No deformity or tenderness. Abdominal:      General: Bowel sounds are normal. There is no distension or abdominal bruit. Palpations: Abdomen is not rigid. Tenderness: There is abdominal tenderness in the left lower quadrant. There is no guarding or rebound. Negative signs include Ferreira's sign and McBurney's sign. Musculoskeletal:      Cervical back: Normal range of motion and neck supple. Skin:     General: Skin is warm. Findings: No rash. Neurological:      Mental Status: She is alert and oriented to person, place, and time. GCS: GCS eye subscore is 4. GCS verbal subscore is 5. GCS motor subscore is 6.    Psychiatric:         Speech: Speech normal.         MEDICAL DECISION MAKIN-year-old presents with complaints of left sided abdominal pain, ongoing over the past few months. Due to her age we will obtain basic labs and a CT scan and reevaluate.    5:02 PM EDT  Patient's laboratory studies and CT scan unremarkable, plan is discharged with outpatient follow-up, return if symptoms worsen or change. CRITICAL CARE:       PROCEDURES:    Procedures    DIAGNOSTIC RESULTS   EKG:All EKG's are interpreted by the Emergency Department Physician who either signs or Co-signs this chart in the absence of a cardiologist.        RADIOLOGY:All plain film, CT, MRI, and formal ultrasound images (except ED bedside ultrasound) are read by the radiologist, see reports below, unless otherwisenoted in MDM or here. CT ABDOMEN PELVIS W IV CONTRAST Additional Contrast? None   Final Result   No acute finding in the abdomen or pelvis. No change from 02/28/2022. RECOMMENDATIONS:   Unavailable           LABS: All lab results were reviewed by myself, and all abnormals are listed below. Labs Reviewed   BASIC METABOLIC PANEL - Abnormal; Notable for the following components:       Result Value    Glucose 152 (*)     CREATININE 0.47 (*)     Bun/Cre Ratio 34 (*)     All other components within normal limits   CBC WITH AUTO DIFFERENTIAL - Abnormal; Notable for the following components:    RBC 3.76 (*)     Hemoglobin 11.8 (*)     Hematocrit 36.1 (*)     NRBC Automated 0.3 (*)     Seg Neutrophils 69 (*)     Lymphocytes 20 (*)     All other components within normal limits   HEPATIC FUNCTION PANEL - Abnormal; Notable for the following components:     Total Bilirubin 0.21 (*)     All other components within normal limits   URINALYSIS - Abnormal; Notable for the following components:    Ketones, Urine TRACE (*)     Specific Gravity, UA 1.080 (*)     Protein, UA TRACE (*)     All other components within normal limits   MICROSCOPIC URINALYSIS - Abnormal; Notable for the following components:    Crystals, UA 5 TO 10 URIC ACID (*)     Bacteria, UA FEW (*)     Mucus, UA 1+ (*)     Amorphous, UA 1+ (*)     All other components within normal limits   LIPASE       EMERGENCY DEPARTMENTCOURSE:         Vitals:    Vitals:    03/19/22 1416   BP: (!) 148/67   Pulse: 100   Resp: 14   Temp: 98.5 °F (36.9 °C)   TempSrc: Oral   SpO2: 100%   Weight: 210 lb (95.3 kg)   Height: 5' 5\" (1.651 m)       The patient was given the following medications while in the emergency department:  Orders Placed This Encounter   Medications    0.9 % sodium chloride bolus    ketorolac (TORADOL) injection 30 mg    sodium chloride flush 0.9 % injection 10 mL    0.9 % sodium chloride bolus    iopamidol (ISOVUE-370) 76 % injection 75 mL     CONSULTS:  None    FINAL IMPRESSION      1. Left lower quadrant abdominal pain          DISPOSITION/PLAN   DISPOSITION Decision To Discharge 03/19/2022 05:01:32 PM      PATIENT REFERRED TO:  Emma Bejarano, APRN - CNP  [address] [address]  [phone]    Schedule an appointment as soon as possible for a visit in 2 days      DISCHARGE MEDICATIONS:  Current Discharge Medication List        The care is provided during an unprecedented national emergency due to the novel coronavirus, COVID 19.   MD Dontae Zavala MD  03/19/22 [address] as outpatient  ENT - Dr Soni as outpatient  Electrophysiology as outpatient  Nephrology - Dr. Chavez as outpatient  Outpatient follow-up with PCP: [facility] ([phone])    Patient Instructions:   Internal medicine    Follow ups  Please follow up with the internal medicine clinic at [facility].Your appointment should be set after your discharge from the nursing facility.  Please keep all other follow up appointments:  Future Appointments   Date Time Provider Department Center   6/3/2025  9:15 AM [facility] ROOM 3 [facility]   6/16/2025  8:30 AM Henrik Fontanez MD [place] [facility]   7/18/2025 10:00 AM Colin Soni DO [place] [facility]   8/4/2025  9:30 AM Martell Momin, JACQUELINE - CNP ELECTRO PHYS [facility]   8/4/2025  9:30 AM SCHEDULE, DEVICE CLINIC 1 [place] [facility]LECTRO PH[facility]        Changes in healthcare   Please take all medications as indicated  Diet: renal diet   Activity: activity as tolerated and ambulate in house  New Medications started during this hospital stay  Midodrine 20 mg, take 1 tablet three times daily  Epoetin lay 10,000 units, inject to skin every two weeks for 10 months  Take your lispro with sliding scale as instructed below  Changes to your medications  Atorvastatin 40 mg, take 1 tablet once daily  Metoprolol succinate 25 mg, take 0.5 tablet once daily  Medications you should stop taking   Empagliflozin (Jardiance)  Lisinopril  Acetazolamide  Please contact us if you have any concerns, wish to change or make an appointment:  Internal medicine clinic   Phone: [phone]  Fax: [phone]  [facility] [facility] [address]  Should you have further questions in regards to this visit, you can review your clinical note and after visit summary document on your [employer] account.     Other than any new prescriptions given to you today, the list of home medications on this After Visit Summary are based on information provided to us from you and

## 2025-05-29 NOTE — PROGRESS NOTES
Department of Internal Medicine  Nephrology Progress Note      Events reviewed.    SUBJECTIVE: We are following Mr. Wander Rocha for CKD.  Reports no new complaints.    PHYSICAL EXAM:      Vitals:    VITALS:  BP (!) 105/51   Pulse 60   Temp 98.2 °F (36.8 °C) (Temporal)   Resp 16   Ht 1.702 m (5' 7.01\")   Wt 85 kg (187 lb 6.3 oz)   SpO2 95%   BMI 29.34 kg/m²   24HR INTAKE/OUTPUT:    Intake/Output Summary (Last 24 hours) at 5/29/2025 0832  Last data filed at 5/29/2025 0450  Gross per 24 hour   Intake 180 ml   Output 2900 ml   Net -2720 ml       Access: Right IJ vein TDC catheter in place  Constitutional:  Awake, alert, oriented, in NAD  HEENT:  PERRLA, normocephalic, atraumatic  Respiratory:  wheezes  Cardiovascular/Edema:  RRR, normal S1, normal S2, + edema  Gastrointestinal: Distended  Neurologic:  Nonfocal  Skin:  warm, dry, no rashes, no lesions    Scheduled Meds:   [START ON 5/30/2025] metOLazone  5 mg Oral Once per day on Monday Wednesday Friday    insulin lispro  0-6 Units SubCUTAneous 4x Daily AC & HS    levothyroxine  150 mcg Oral Daily    midodrine  20 mg Oral TID WC    lidocaine  1 patch TransDERmal Daily    [Held by provider] lactulose  20 g Oral TID    [Held by provider] rifAXIMin  400 mg Oral TID    ipratropium 0.5 mg-albuterol 2.5 mg  1 Dose Inhalation Q4H WA RT    [Held by provider] sevelamer  0.8 g Oral TID WC    sodium chloride  1 spray Each Nostril TID    metoprolol succinate  12.5 mg Oral Daily    epoetin lay-epbx  3,000 Units SubCUTAneous Once per day on Tuesday Thursday Saturday    sodium chloride flush  5-40 mL IntraVENous 2 times per day    [Held by provider] allopurinol  200 mg Oral Daily    apixaban  5 mg Oral BID    aspirin  81 mg Oral Daily    atorvastatin  40 mg Oral Daily    bumetanide  2 mg Oral BID    vitamin D  2,000 Units Oral Daily    sertraline  50 mg Oral Daily    [Held by provider] tamsulosin  0.4 mg Oral Daily     Continuous Infusions:   dextrose      sodium chloride

## 2025-05-29 NOTE — PLAN OF CARE
Problem: Safety - Adult  Goal: Free from fall injury  Outcome: Progressing     Problem: Chronic Conditions and Co-morbidities  Goal: Patient's chronic conditions and co-morbidity symptoms are monitored and maintained or improved  Outcome: Progressing     Problem: Discharge Planning  Goal: Discharge to home or other facility with appropriate resources  Outcome: Progressing     Problem: Skin/Tissue Integrity  Goal: Skin integrity remains intact  Description: 1.  Monitor for areas of redness and/or skin breakdown2.  Assess vascular access sites hourly3.  Every 4-6 hours minimum:  Change oxygen saturation probe site4.  Every 4-6 hours:  If on nasal continuous positive airway pressure, respiratory therapy assess nares and determine need for appliance change or resting period  Outcome: Progressing     Problem: Skin/Tissue Integrity  Goal: Skin integrity remains intact  Description: 1.  Monitor for areas of redness and/or skin breakdown2.  Assess vascular access sites hourly3.  Every 4-6 hours minimum:  Change oxygen saturation probe site4.  Every 4-6 hours:  If on nasal continuous positive airway pressure, respiratory therapy assess nares and determine need for appliance change or resting period  Outcome: Progressing     Problem: ABCDS Injury Assessment  Goal: Absence of physical injury  Outcome: Progressing     Problem: Pain  Goal: Verbalizes/displays adequate comfort level or baseline comfort level  Outcome: Progressing     Problem: Neurosensory - Adult  Goal: Achieves stable or improved neurological status  Outcome: Progressing     Problem: Respiratory - Adult  Goal: Achieves optimal ventilation and oxygenation  Outcome: Progressing     Problem: Cardiovascular - Adult  Goal: Maintains optimal cardiac output and hemodynamic stability  Outcome: Progressing     Problem: Nutrition Deficit:  Goal: Optimize nutritional status  Outcome: Progressing

## 2025-05-29 NOTE — PLAN OF CARE
Problem: Safety - Adult  Goal: Free from fall injury  Outcome: Completed     Problem: Chronic Conditions and Co-morbidities  Goal: Patient's chronic conditions and co-morbidity symptoms are monitored and maintained or improved  Outcome: Completed     Problem: Discharge Planning  Goal: Discharge to home or other facility with appropriate resources  Outcome: Completed     Problem: Skin/Tissue Integrity  Goal: Skin integrity remains intact  Description: 1.  Monitor for areas of redness and/or skin breakdown2.  Assess vascular access sites hourly3.  Every 4-6 hours minimum:  Change oxygen saturation probe site4.  Every 4-6 hours:  If on nasal continuous positive airway pressure, respiratory therapy assess nares and determine need for appliance change or resting period  Outcome: Completed     Problem: ABCDS Injury Assessment  Goal: Absence of physical injury  Outcome: Completed     Problem: Pain  Goal: Verbalizes/displays adequate comfort level or baseline comfort level  Outcome: Completed     Problem: Neurosensory - Adult  Goal: Achieves stable or improved neurological status  Outcome: Completed     Problem: Respiratory - Adult  Goal: Achieves optimal ventilation and oxygenation  Outcome: Completed     Problem: Cardiovascular - Adult  Goal: Maintains optimal cardiac output and hemodynamic stability  Outcome: Completed     Problem: Nutrition Deficit:  Goal: Optimize nutritional status  Outcome: Completed

## 2025-05-29 NOTE — PROGRESS NOTES
[facility]  Department of Pulmonary, Critical Care and Sleep Medicine  [facility]  Department of Internal Medicine  Progress Note    SUBJECTIVE:    No CP,   SOB improved  Cr improved    OBJECTIVE:  Vitals:    05/29/25 0750 05/29/25 0901 05/29/25 1202 05/29/25 1240   BP:    (!) 120/54   Pulse: 60 66 85 65   Resp: 16  24    Temp:       TempSrc:       SpO2: 95%  100%    Weight:       Height:         Constitutional: Alert,     EENT: EOMI ESTRELLA. MMM. No icterus. No thrush.     Neck: No thyromegaly. No elevated JVP. Trachea was midline.   Respiratory: Symmetrical.  Breath sounds were clear.    Cardiovascular: Regular, No murmur. No rubs.      Pulses:  Equal bilaterally.    Abdomen: Soft without organomegaly. No rebound, rigidity.  No guarding.  Lymphatic: No lymphadenopathy.  Musculoskeletal: Without weakness or gross deficits  Extremities:  No lower extremity edema. Reflexes appear adequate.   Skin:  Warm and dry.  No skin rashes.   Neurological/Psychiatric: No acute psychosis. Cranial nerves are intact.        DATA:  The data collected below information that was obtained, reviewed, analyzed and interpreted today. Imaging test are reviewed with the radiologist during weekly conference rounds. Comparison to previous images are always explored.     Monitor Strips:  My interpretation and reviewed of the cardiac monitor and further discussion with technical team reveals no changes noted and the patient is in sinus rhythm     RADIOLOGY:  My interpretation and review of the current films reveals  congestion      CBC:   Lab Results   Component Value Date/Time    WBC 9.3 05/29/2025 04:42 AM    RBC 2.90 05/29/2025 04:42 AM    HGB 8.6 05/29/2025 04:42 AM    HCT 27.6 05/29/2025 04:42 AM    MCV 95.2 05/29/2025 04:42 AM    MCH 29.7 05/29/2025 04:42 AM    MCHC 31.2 05/29/2025 04:42 AM    RDW 18.6 05/29/2025 04:42 AM     05/29/2025 04:42 AM    MPV 10.1 05/29/2025

## 2025-05-29 NOTE — PROGRESS NOTES
ENDOCRINOLOGY PROGRESS NOTE      Date of admission: 5/14/2025  Date of service: 5/28/2025  Admitting physician: Martell Rosenberg MD   Primary Care Physician: Oly Crespo MD  Consultant physician: Luiz Luciano MD     Reason for the consultation:  Uncontrolled DM    History of Present Illness:  The history is provided by the patient. Accuracy of the patient data is excellent    Wander Rocha is a very pleasant 69 y.o. old male with PMH of poorly controlled type 2 diabetes, CKD, A-fib on oral anticoagulation, primary hypothyroidism, dyslipidemia and other listed below admitted to [facility] on 5/14/2025 because of RADHA, endocrine service was consulted for diabetes management.     Subjective  Patient was seen this afternoon, no acute events overnight, glucose level in range.    Point of care glucose monitoring   (Independently reviewed)   Recent Labs     05/26/25 2057 05/27/25  0604 05/27/25  1125 05/27/25  1646 05/27/25  2029 05/28/25  0544 05/28/25  1114 05/28/25  1610   POCGLU 126* 159* 228* 137* 166* 145* 206* 115*      Allergy and drug reactions:   Allergies   Allergen Reactions    Jardiance [Empagliflozin] Other (See Comments)     Episode of euglycemic DKA 5/2025; no further benefit seen in continuing therapy       Scheduled Meds:   [START ON 5/30/2025] metOLazone  5 mg Oral Once per day on Monday Wednesday Friday    insulin lispro  0-6 Units SubCUTAneous 4x Daily AC & HS    levothyroxine  150 mcg Oral Daily    midodrine  20 mg Oral TID WC    lidocaine  1 patch TransDERmal Daily    [Held by provider] lactulose  20 g Oral TID    [Held by provider] rifAXIMin  400 mg Oral TID    ipratropium 0.5 mg-albuterol 2.5 mg  1 Dose Inhalation Q4H WA RT    [Held by provider] sevelamer  0.8 g Oral TID WC    sodium chloride  1 spray Each Nostril TID    metoprolol succinate  12.5 mg Oral Daily    epoetin lay-epbx  3,000 Units SubCUTAneous Once per day on Tuesday Thursday Saturday    sodium chloride flush  5-40 mL IntraVENous 2  iron with it      Interdisciplinary plan for communication with healthcare providers:   Consult recommendations were discussed with the Primary Service/Nursing staff      The above issues were reviewed with the patient who understood and agreed with the plan.    Thank you for allowing us to participate in the care of this patient. Please do not hesitate to contact us with any additional questions.     Luiz Luciano MD  Endocrinologist, [place] [facility] [place] and Endocrinology   [facility]  [address] [address] MED SURG ONC  [address] [address]  Dept: [phone]  Loc: [phone]   Phone: [phone]  Fax: [phone]  --------------------------------------------  An electronic signature was used to authenticate this note. Luiz Luciano MD on 5/28/2025 at 9:44 PM

## 2025-05-30 ENCOUNTER — APPOINTMENT (OUTPATIENT)
Dept: OTHER | Age: 70
DRG: 871 | End: 2025-05-30
Payer: MEDICARE

## 2025-05-30 ASSESSMENT — ENCOUNTER SYMPTOMS
CONSTIPATION: 0
COUGH: 0
SHORTNESS OF BREATH: 0
NAUSEA: 0
WHEEZING: 0
BACK PAIN: 0
DIARRHEA: 0
VOMITING: 0
ABDOMINAL PAIN: 0

## 2025-06-01 LAB
MICROORGANISM SPEC CULT: NORMAL
MICROORGANISM/AGENT SPEC: NORMAL
SPECIMEN DESCRIPTION: NORMAL

## 2025-06-03 ENCOUNTER — HOSPITAL ENCOUNTER (OUTPATIENT)
Dept: OTHER | Age: 70
Setting detail: THERAPIES SERIES
Discharge: HOME | End: 2025-06-03
Payer: MEDICARE

## 2025-06-03 ENCOUNTER — HOSPITAL ENCOUNTER (OUTPATIENT)
Dept: OTHER | Age: 70
Setting detail: THERAPIES SERIES
End: 2025-06-03
Payer: MEDICARE

## 2025-06-03 VITALS
BODY MASS INDEX: 28.66 KG/M2 | OXYGEN SATURATION: 97 % | DIASTOLIC BLOOD PRESSURE: 58 MMHG | RESPIRATION RATE: 18 BRPM | SYSTOLIC BLOOD PRESSURE: 123 MMHG | HEART RATE: 74 BPM | WEIGHT: 183 LBS

## 2025-06-03 LAB
ANION GAP SERPL CALCULATED.3IONS-SCNC: 13 MMOL/L (ref 7–16)
BNP SERPL-MCNC: 5082 PG/ML (ref 0–125)
BUN SERPL-MCNC: 52 MG/DL (ref 8–23)
CALCIUM SERPL-MCNC: 8.8 MG/DL (ref 8.8–10.2)
CHLORIDE SERPL-SCNC: 93 MMOL/L (ref 98–107)
CO2 SERPL-SCNC: 29 MMOL/L (ref 22–29)
CREAT SERPL-MCNC: 1.7 MG/DL (ref 0.7–1.2)
GFR, ESTIMATED: 43 ML/MIN/1.73M2
GLUCOSE SERPL-MCNC: 197 MG/DL (ref 74–99)
MICROORGANISM SPEC CULT: NORMAL
MICROORGANISM/AGENT SPEC: NORMAL
POTASSIUM SERPL-SCNC: 3.6 MMOL/L (ref 3.5–5.1)
SODIUM SERPL-SCNC: 135 MMOL/L (ref 136–145)
SPECIMEN DESCRIPTION: NORMAL

## 2025-06-03 PROCEDURE — 83880 ASSAY OF NATRIURETIC PEPTIDE: CPT

## 2025-06-03 PROCEDURE — 80048 BASIC METABOLIC PNL TOTAL CA: CPT

## 2025-06-03 PROCEDURE — 99214 OFFICE O/P EST MOD 30 MIN: CPT

## 2025-06-03 ASSESSMENT — PATIENT HEALTH QUESTIONNAIRE - PHQ9
SUM OF ALL RESPONSES TO PHQ QUESTIONS 1-9: 0
2. FEELING DOWN, DEPRESSED OR HOPELESS: NOT AT ALL
SUM OF ALL RESPONSES TO PHQ QUESTIONS 1-9: 0
SUM OF ALL RESPONSES TO PHQ QUESTIONS 1-9: 0
1. LITTLE INTEREST OR PLEASURE IN DOING THINGS: NOT AT ALL
SUM OF ALL RESPONSES TO PHQ QUESTIONS 1-9: 0

## 2025-06-03 NOTE — PROGRESS NOTES
Discussed patient with ELIZABETH PHILLIPS NP  Order given to have patient check BP 2 x a day, and to notify us of any  or less Systolic.  Also, to notify us of any s/s of CHF(weight gain, SOB, increase in Edema)      Wife Petra called and given above instructions. She demonstrates understanding.

## 2025-06-03 NOTE — PLAN OF CARE
Problem: Chronic Conditions and Co-morbidities  Goal: Patient's chronic conditions and co-morbidity symptoms are monitored and maintained or improved  Flowsheets (Taken 6/3/2025 1101)  Care Plan - Patient's Chronic Conditions and Co-Morbidity Symptoms are Monitored and Maintained or Improved: Monitor and assess patient's chronic conditions and comorbid symptoms for stability, deterioration, or improvement

## 2025-06-03 NOTE — PROGRESS NOTES
Congestive Heart Failure Clinic   [facility]       Referring Provider: -    Primary Care Physician: Oly Crespo MD   Cardiologist: -DR. MERCER  Nephrologist: Dr. Sánchez      HISTORY OF PRESENT ILLNESS:     Wander Rocha is a 69 y.o. (1955) male with a history of HFpEF, most recent EF:  Lab Results   Component Value Date    LVEF 50 04/29/2025    LVEFMODE Echo 04/29/2025     He presents to the CHF clinic for ongoing evaluation and monitoring of heart failure.    In the CHF clinic today he denies any adverse symptoms except:  [] Dizziness or lightheadedness   [] Syncope or near syncope  [] Recent Fall  [] Shortness of breath at rest   [x] Dyspnea with exertion (occasionally with exertion)  [] Decline in functional capacity (unable to perform activities they had previously been able to do)  [] Fatigue   [] Orthopnea  [] PND  [] Nocturnal cough  [] Hemoptysis  [] Chest pain, pressure, or discomfort  [] Palpitations  [] Abdominal distention  [] Abdominal bloating  [] Early satiety  [] Blood in stool   [] Diarrhea  [] Constipation  [] Nausea/Vomiting  [] Decreased urinary response to oral diuretic   [] Scrotal swelling   [x] Lower extremity edema(slight)  [] Used PRN doses of oral diuretic   [] Weight gain       Wt Readings from Last 3 Encounters:   06/03/25 83 kg (183 lb)   05/29/25 85 kg (187 lb 6.3 oz)   05/14/25 96.6 kg (213 lb)       SOCIAL HISTORY:  [x] Denies tobacco, alcohol or illicit drug abuse  [] Tobacco use:  [] ETOH use:  [] Illicit drug use:        MEDICATIONS:    Allergies   Allergen Reactions    Jardiance [Empagliflozin] Other (See Comments)     Episode of euglycemic DKA 5/2025; no further benefit seen in continuing therapy       Current Outpatient Medications:     aspirin 81 MG EC tablet, Take 1 tablet by mouth daily, Disp: , Rfl:     apixaban (ELIQUIS) 5 MG TABS tablet, Take 1 tablet by mouth 2 times daily, Disp: , Rfl:     atorvastatin (LIPITOR) 40 MG

## 2025-06-04 ENCOUNTER — TELEPHONE (OUTPATIENT)
Dept: NON INVASIVE DIAGNOSTICS | Age: 70
End: 2025-06-04

## 2025-06-04 NOTE — TELEPHONE ENCOUNTER
I left a message on patient's voicemail. I asked that he bring his old home monitor to his device appointment this coming Friday, 6/6/2025.     Madalyn Sesay RN, BSN  [facility] [place]   Device Clinic

## 2025-06-06 ENCOUNTER — CLINICAL SUPPORT (OUTPATIENT)
Dept: NON INVASIVE DIAGNOSTICS | Age: 70
End: 2025-06-06

## 2025-06-06 DIAGNOSIS — Z95.0 PACEMAKER: Primary | ICD-10-CM

## 2025-06-06 NOTE — PATIENT INSTRUCTIONS
You may shower starting now    Call if any signs or symptoms of infection [phone] ext: [phone]  Fevers, chills, redness, swelling or drainage.       Hook up home  Monitor    [employer] connected: [phone]

## 2025-06-08 LAB
MICROORGANISM SPEC CULT: NORMAL
MICROORGANISM/AGENT SPEC: NORMAL
SPECIMEN DESCRIPTION: NORMAL

## 2025-06-10 LAB
MICROORGANISM SPEC CULT: NORMAL
MICROORGANISM/AGENT SPEC: NORMAL
SPECIMEN DESCRIPTION: NORMAL

## 2025-06-12 ENCOUNTER — HOSPITAL ENCOUNTER (OUTPATIENT)
Dept: OTHER | Age: 70
Setting detail: THERAPIES SERIES
Discharge: HOME | End: 2025-06-12
Payer: MEDICARE

## 2025-06-12 VITALS
HEART RATE: 85 BPM | BODY MASS INDEX: 29.91 KG/M2 | RESPIRATION RATE: 18 BRPM | WEIGHT: 191 LBS | DIASTOLIC BLOOD PRESSURE: 56 MMHG | SYSTOLIC BLOOD PRESSURE: 111 MMHG | OXYGEN SATURATION: 93 %

## 2025-06-12 LAB
ANION GAP SERPL CALCULATED.3IONS-SCNC: 12 MMOL/L (ref 7–16)
BNP SERPL-MCNC: 5846 PG/ML (ref 0–125)
BUN SERPL-MCNC: 64 MG/DL (ref 8–23)
CALCIUM SERPL-MCNC: 9.2 MG/DL (ref 8.8–10.2)
CHLORIDE SERPL-SCNC: 97 MMOL/L (ref 98–107)
CO2 SERPL-SCNC: 27 MMOL/L (ref 22–29)
CREAT SERPL-MCNC: 1.8 MG/DL (ref 0.7–1.2)
GFR, ESTIMATED: 41 ML/MIN/1.73M2
GLUCOSE SERPL-MCNC: 166 MG/DL (ref 74–99)
POTASSIUM SERPL-SCNC: 3.7 MMOL/L (ref 3.5–5.1)
SODIUM SERPL-SCNC: 135 MMOL/L (ref 136–145)

## 2025-06-12 PROCEDURE — 80048 BASIC METABOLIC PNL TOTAL CA: CPT

## 2025-06-12 PROCEDURE — 99214 OFFICE O/P EST MOD 30 MIN: CPT

## 2025-06-12 PROCEDURE — 2500000003 HC RX 250 WO HCPCS: Performed by: INTERNAL MEDICINE

## 2025-06-12 PROCEDURE — 83880 ASSAY OF NATRIURETIC PEPTIDE: CPT

## 2025-06-12 PROCEDURE — 6360000002 HC RX W HCPCS: Performed by: INTERNAL MEDICINE

## 2025-06-12 RX ORDER — BUMETANIDE 0.25 MG/ML
2 INJECTION, SOLUTION INTRAMUSCULAR; INTRAVENOUS ONCE
Status: COMPLETED | OUTPATIENT
Start: 2025-06-12 | End: 2025-06-12

## 2025-06-12 RX ORDER — SODIUM CHLORIDE 0.9 % (FLUSH) 0.9 %
5-40 SYRINGE (ML) INJECTION ONCE
Status: COMPLETED | OUTPATIENT
Start: 2025-06-12 | End: 2025-06-12

## 2025-06-12 RX ADMIN — BUMETANIDE 2 MG: 0.25 INJECTION INTRAMUSCULAR; INTRAVENOUS at 07:42

## 2025-06-12 RX ADMIN — SODIUM CHLORIDE, PRESERVATIVE FREE 10 ML: 5 INJECTION INTRAVENOUS at 07:42

## 2025-06-12 NOTE — PROGRESS NOTES
ELECTRO PHYS [facility]   8/4/2025  9:30 AM SCHEDULE, DEVICE CLINIC 1 [facility] ELECTRO PHYS HP

## 2025-06-12 NOTE — PLAN OF CARE
Problem: Chronic Conditions and Co-morbidities  Goal: Patient's chronic conditions and co-morbidity symptoms are monitored and maintained or improved  Flowsheets (Taken 6/12/2025 4399)  Care Plan - Patient's Chronic Conditions and Co-Morbidity Symptoms are Monitored and Maintained or Improved: Monitor and assess patient's chronic conditions and comorbid symptoms for stability, deterioration, or improvement

## 2025-06-13 ENCOUNTER — RESULTS FOLLOW-UP (OUTPATIENT)
Age: 70
End: 2025-06-13

## 2025-06-15 LAB
MICROORGANISM SPEC CULT: NORMAL
MICROORGANISM/AGENT SPEC: NORMAL
SPECIMEN DESCRIPTION: NORMAL

## 2025-06-16 ENCOUNTER — OFFICE VISIT (OUTPATIENT)
Dept: CARDIOLOGY CLINIC | Age: 70
End: 2025-06-16
Payer: MEDICARE

## 2025-06-16 VITALS
BODY MASS INDEX: 31.83 KG/M2 | WEIGHT: 202.8 LBS | HEART RATE: 75 BPM | HEIGHT: 67 IN | SYSTOLIC BLOOD PRESSURE: 130 MMHG | DIASTOLIC BLOOD PRESSURE: 67 MMHG | RESPIRATION RATE: 18 BRPM

## 2025-06-16 DIAGNOSIS — I50.22 HEART FAILURE WITH MID-RANGE EJECTION FRACTION (HFMEF) (HCC): Primary | ICD-10-CM

## 2025-06-16 PROBLEM — M79.89 SWELLING OF ARM: Status: RESOLVED | Noted: 2025-05-15 | Resolved: 2025-06-16

## 2025-06-16 PROBLEM — B96.89 UTI DUE TO KLEBSIELLA SPECIES: Status: RESOLVED | Noted: 2025-05-26 | Resolved: 2025-06-16

## 2025-06-16 PROBLEM — N39.0 UTI DUE TO KLEBSIELLA SPECIES: Status: RESOLVED | Noted: 2025-05-26 | Resolved: 2025-06-16

## 2025-06-16 PROBLEM — J96.01 ACUTE HYPOXIC RESPIRATORY FAILURE (HCC): Status: RESOLVED | Noted: 2025-05-16 | Resolved: 2025-06-16

## 2025-06-16 PROBLEM — I49.5 TACHYCARDIA-BRADYCARDIA (HCC): Status: RESOLVED | Noted: 2017-10-03 | Resolved: 2025-06-16

## 2025-06-16 PROBLEM — N17.9 AKI (ACUTE KIDNEY INJURY): Status: RESOLVED | Noted: 2025-05-14 | Resolved: 2025-06-16

## 2025-06-16 PROBLEM — I50.9 ACUTE DECOMPENSATED HEART FAILURE (HCC): Status: RESOLVED | Noted: 2021-09-17 | Resolved: 2025-06-16

## 2025-06-16 PROBLEM — I50.82 BIVENTRICULAR CHF (CONGESTIVE HEART FAILURE) (HCC): Status: RESOLVED | Noted: 2025-05-05 | Resolved: 2025-06-16

## 2025-06-16 PROCEDURE — 3078F DIAST BP <80 MM HG: CPT | Performed by: INTERNAL MEDICINE

## 2025-06-16 PROCEDURE — 1123F ACP DISCUSS/DSCN MKR DOCD: CPT | Performed by: INTERNAL MEDICINE

## 2025-06-16 PROCEDURE — 3075F SYST BP GE 130 - 139MM HG: CPT | Performed by: INTERNAL MEDICINE

## 2025-06-16 PROCEDURE — 1036F TOBACCO NON-USER: CPT | Performed by: INTERNAL MEDICINE

## 2025-06-16 PROCEDURE — 99214 OFFICE O/P EST MOD 30 MIN: CPT | Performed by: INTERNAL MEDICINE

## 2025-06-16 PROCEDURE — 1159F MED LIST DOCD IN RCRD: CPT | Performed by: INTERNAL MEDICINE

## 2025-06-16 PROCEDURE — 3017F COLORECTAL CA SCREEN DOC REV: CPT | Performed by: INTERNAL MEDICINE

## 2025-06-16 PROCEDURE — G8427 DOCREV CUR MEDS BY ELIG CLIN: HCPCS | Performed by: INTERNAL MEDICINE

## 2025-06-16 PROCEDURE — G8417 CALC BMI ABV UP PARAM F/U: HCPCS | Performed by: INTERNAL MEDICINE

## 2025-06-16 PROCEDURE — 1160F RVW MEDS BY RX/DR IN RCRD: CPT | Performed by: INTERNAL MEDICINE

## 2025-06-16 PROCEDURE — G2211 COMPLEX E/M VISIT ADD ON: HCPCS | Performed by: INTERNAL MEDICINE

## 2025-06-16 PROCEDURE — 93000 ELECTROCARDIOGRAM COMPLETE: CPT | Performed by: INTERNAL MEDICINE

## 2025-06-16 PROCEDURE — 1111F DSCHRG MED/CURRENT MED MERGE: CPT | Performed by: INTERNAL MEDICINE

## 2025-06-16 RX ORDER — POLYETHYLENE GLYCOL 3350 17 G/17G
17 POWDER, FOR SOLUTION ORAL DAILY PRN
COMMUNITY
Start: 2025-06-11

## 2025-06-16 RX ORDER — PSEUDOEPHEDRINE HCL 30 MG
100 TABLET ORAL 2 TIMES DAILY
COMMUNITY
Start: 2025-06-11 | End: 2026-06-11

## 2025-06-16 NOTE — PROGRESS NOTES
Chief Complaint   Patient presents with    Congestive Heart Failure       Patient Active Problem List    Diagnosis Date Noted    CKD (chronic kidney disease) stage 5, GFR less than 15 ml/min ([facility]) 05/26/2025    ESRD (end stage renal disease) ([facility]) 05/26/2025    Diabetic ketoacidosis without coma associated with type 2 diabetes mellitus ([facility]) 05/20/2025    Dietary noncompliance 05/20/2025    Cardiorenal syndrome 05/05/2025    Pressure injury of buttock, stage 1 05/05/2025    VHD (valvular heart disease) 05/05/2025    Heart failure with mid-range ejection fraction (HFmEF) ([facility]) 04/25/2025    Cardiac resynchronization therapy pacemaker (CRT-P) in place 04/23/2025    Nonischemic cardiomyopathy ([facility]) 04/23/2025    Major depressive disorder, recurrent, unspecified 12/14/2023    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, unspecified laterality ([facility]) 07/07/2023    Type 2 diabetes mellitus with diabetic neuropathy, with long-term current use of insulin ([facility]) 06/01/2023    Gout 02/01/2023    Proteinuria due to type 2 diabetes mellitus ([facility]) 11/16/2022     Overview Note:     Last Assessment & Plan:   Formatting of this note might be different from the original.  Current glucose 159.  P/C ratio 0.1 Stable.   Plan:  Will defer to PCP for glucose management.  Will continue to monitor.      Normocytic anemia     Chronic obstructive pulmonary disease, unspecified COPD type ([facility]) 09/13/2021    Persistent atrial fibrillation ([facility]) 07/25/2018    Pacemaker      Overview Note:     DOI 10/3/2017  Medtronic; dual chamber; MRI conditional   Indication: Sinus node dysfunction     Upgrade to CRT-P on 04/24/2025      Restrictive lung disease secondary to obesity 07/25/2016    NIKO (obstructive sleep apnea) 08/08/2013    Essential hypertension 05/04/2011    Hypothyroidism 05/04/2011    Poorly controlled type 2 diabetes mellitus ([facility]) 01/31/2011    Class 2 obesity due to excess calories with serious comorbidity and body mass index (BMI) of 38.0

## 2025-06-17 LAB
MICROORGANISM SPEC CULT: NORMAL
MICROORGANISM SPEC CULT: NORMAL
MICROORGANISM/AGENT SPEC: NORMAL
MICROORGANISM/AGENT SPEC: NORMAL
SPECIMEN DESCRIPTION: NORMAL
SPECIMEN DESCRIPTION: NORMAL

## 2025-06-18 ENCOUNTER — APPOINTMENT (OUTPATIENT)
Dept: OTHER | Age: 70
End: 2025-06-18
Payer: MEDICARE

## 2025-06-24 ENCOUNTER — HOSPITAL ENCOUNTER (OUTPATIENT)
Dept: OTHER | Age: 70
Setting detail: THERAPIES SERIES
Discharge: HOME | End: 2025-06-24
Payer: MEDICARE

## 2025-06-24 ENCOUNTER — RESULTS FOLLOW-UP (OUTPATIENT)
Age: 70
End: 2025-06-24

## 2025-06-24 VITALS
OXYGEN SATURATION: 94 % | DIASTOLIC BLOOD PRESSURE: 58 MMHG | BODY MASS INDEX: 32.11 KG/M2 | SYSTOLIC BLOOD PRESSURE: 122 MMHG | WEIGHT: 205 LBS | RESPIRATION RATE: 18 BRPM | HEART RATE: 70 BPM

## 2025-06-24 PROBLEM — N17.9 AKI (ACUTE KIDNEY INJURY): Status: ACTIVE | Noted: 2025-06-24

## 2025-06-24 LAB
ALBUMIN SERPL-MCNC: 3.6 G/DL (ref 3.5–5.2)
ALP SERPL-CCNC: 117 U/L (ref 40–129)
ALT SERPL-CCNC: 17 U/L (ref 0–50)
ANION GAP SERPL CALCULATED.3IONS-SCNC: 14 MMOL/L (ref 7–16)
ANION GAP SERPL CALCULATED.3IONS-SCNC: 16 MMOL/L (ref 7–16)
AST SERPL-CCNC: 35 U/L (ref 0–50)
BASOPHILS # BLD: 0.04 K/UL (ref 0–0.2)
BASOPHILS NFR BLD: 1 % (ref 0–2)
BILIRUB SERPL-MCNC: 0.5 MG/DL (ref 0–1.2)
BILIRUB UR QL STRIP: NEGATIVE
BNP SERPL-MCNC: 6163 PG/ML (ref 0–125)
BUN SERPL-MCNC: 70 MG/DL (ref 8–23)
BUN SERPL-MCNC: 72 MG/DL (ref 8–23)
CALCIUM SERPL-MCNC: 9.5 MG/DL (ref 8.8–10.2)
CALCIUM SERPL-MCNC: 9.6 MG/DL (ref 8.8–10.2)
CHLORIDE SERPL-SCNC: 98 MMOL/L (ref 98–107)
CHLORIDE SERPL-SCNC: 99 MMOL/L (ref 98–107)
CHLORIDE UR-SCNC: 79 MMOL/L
CLARITY UR: CLEAR
CO2 SERPL-SCNC: 23 MMOL/L (ref 22–29)
CO2 SERPL-SCNC: 25 MMOL/L (ref 22–29)
COLOR UR: YELLOW
COMMENT: NORMAL
CREAT SERPL-MCNC: 2.4 MG/DL (ref 0.7–1.2)
CREAT SERPL-MCNC: 2.6 MG/DL (ref 0.7–1.2)
CREAT UR-MCNC: 42 MG/DL (ref 40–278)
EOSINOPHIL # BLD: 0.42 K/UL (ref 0.05–0.5)
EOSINOPHILS RELATIVE PERCENT: 7 % (ref 0–6)
ERYTHROCYTE [DISTWIDTH] IN BLOOD BY AUTOMATED COUNT: 18.7 % (ref 11.5–15)
GFR, ESTIMATED: 26 ML/MIN/1.73M2
GFR, ESTIMATED: 28 ML/MIN/1.73M2
GLUCOSE BLD-MCNC: 177 MG/DL (ref 74–99)
GLUCOSE SERPL-MCNC: 133 MG/DL (ref 74–99)
GLUCOSE SERPL-MCNC: 152 MG/DL (ref 74–99)
GLUCOSE UR STRIP-MCNC: NEGATIVE MG/DL
HCT VFR BLD AUTO: 27.5 % (ref 37–54)
HGB BLD-MCNC: 8.5 G/DL (ref 12.5–16.5)
HGB UR QL STRIP.AUTO: NEGATIVE
IMM GRANULOCYTES # BLD AUTO: 0.1 K/UL (ref 0–0.58)
IMM GRANULOCYTES NFR BLD: 2 % (ref 0–5)
INR PPP: 1.8
KETONES UR STRIP-MCNC: NEGATIVE MG/DL
LEUKOCYTE ESTERASE UR QL STRIP: NEGATIVE
LYMPHOCYTES NFR BLD: 0.72 K/UL (ref 1.5–4)
LYMPHOCYTES RELATIVE PERCENT: 12 % (ref 20–42)
MCH RBC QN AUTO: 29.3 PG (ref 26–35)
MCHC RBC AUTO-ENTMCNC: 30.9 G/DL (ref 32–34.5)
MCV RBC AUTO: 94.8 FL (ref 80–99.9)
MICROORGANISM SPEC CULT: NORMAL
MICROORGANISM/AGENT SPEC: NORMAL
MONOCYTES NFR BLD: 0.56 K/UL (ref 0.1–0.95)
MONOCYTES NFR BLD: 9 % (ref 2–12)
NEUTROPHILS NFR BLD: 71 % (ref 43–80)
NEUTS SEG NFR BLD: 4.4 K/UL (ref 1.8–7.3)
NITRITE UR QL STRIP: NEGATIVE
PH UR STRIP: 6 [PH] (ref 5–8)
PLATELET # BLD AUTO: 159 K/UL (ref 130–450)
PMV BLD AUTO: 10.1 FL (ref 7–12)
POTASSIUM SERPL-SCNC: 4.5 MMOL/L (ref 3.5–5.1)
POTASSIUM SERPL-SCNC: 5.1 MMOL/L (ref 3.5–5.1)
POTASSIUM, UR: 40.6 MMOL/L
PROT SERPL-MCNC: 7.6 G/DL (ref 6.4–8.3)
PROT UR STRIP-MCNC: NEGATIVE MG/DL
PROTHROMBIN TIME: 20.2 SEC (ref 9.3–12.4)
PSA SERPL-MCNC: 0.33 NG/ML (ref 0–4)
RBC # BLD AUTO: 2.9 M/UL (ref 3.8–5.8)
SODIUM SERPL-SCNC: 136 MMOL/L (ref 136–145)
SODIUM SERPL-SCNC: 138 MMOL/L (ref 136–145)
SODIUM UR-SCNC: 69 MMOL/L
SP GR UR STRIP: 1.01 (ref 1–1.03)
SPECIMEN DESCRIPTION: NORMAL
UROBILINOGEN UR STRIP-ACNC: 0.2 EU/DL (ref 0–1)
UUN UR-MCNC: 269 MG/DL (ref 800–1666)
WBC OTHER # BLD: 6.2 K/UL (ref 4.5–11.5)

## 2025-06-24 PROCEDURE — 2500000003 HC RX 250 WO HCPCS: Performed by: NURSE PRACTITIONER

## 2025-06-24 PROCEDURE — 84133 ASSAY OF URINE POTASSIUM: CPT

## 2025-06-24 PROCEDURE — 82962 GLUCOSE BLOOD TEST: CPT

## 2025-06-24 PROCEDURE — 99285 EMERGENCY DEPT VISIT HI MDM: CPT

## 2025-06-24 PROCEDURE — 85025 COMPLETE CBC W/AUTO DIFF WBC: CPT

## 2025-06-24 PROCEDURE — 84300 ASSAY OF URINE SODIUM: CPT

## 2025-06-24 PROCEDURE — 84540 ASSAY OF URINE/UREA-N: CPT

## 2025-06-24 PROCEDURE — 82570 ASSAY OF URINE CREATININE: CPT

## 2025-06-24 PROCEDURE — 82436 ASSAY OF URINE CHLORIDE: CPT

## 2025-06-24 PROCEDURE — 2500000003 HC RX 250 WO HCPCS

## 2025-06-24 PROCEDURE — 80048 BASIC METABOLIC PNL TOTAL CA: CPT

## 2025-06-24 PROCEDURE — 85610 PROTHROMBIN TIME: CPT

## 2025-06-24 PROCEDURE — G0103 PSA SCREENING: HCPCS

## 2025-06-24 PROCEDURE — 99214 OFFICE O/P EST MOD 30 MIN: CPT

## 2025-06-24 PROCEDURE — 93005 ELECTROCARDIOGRAM TRACING: CPT

## 2025-06-24 PROCEDURE — 99223 1ST HOSP IP/OBS HIGH 75: CPT | Performed by: INTERNAL MEDICINE

## 2025-06-24 PROCEDURE — 87086 URINE CULTURE/COLONY COUNT: CPT

## 2025-06-24 PROCEDURE — 80053 COMPREHEN METABOLIC PANEL: CPT

## 2025-06-24 PROCEDURE — 6370000000 HC RX 637 (ALT 250 FOR IP)

## 2025-06-24 PROCEDURE — 51798 US URINE CAPACITY MEASURE: CPT

## 2025-06-24 PROCEDURE — 96374 THER/PROPH/DIAG INJ IV PUSH: CPT

## 2025-06-24 PROCEDURE — 83880 ASSAY OF NATRIURETIC PEPTIDE: CPT

## 2025-06-24 PROCEDURE — 81003 URINALYSIS AUTO W/O SCOPE: CPT

## 2025-06-24 PROCEDURE — 6360000002 HC RX W HCPCS: Performed by: NURSE PRACTITIONER

## 2025-06-24 RX ORDER — POLYETHYLENE GLYCOL 3350 17 G/17G
17 POWDER, FOR SOLUTION ORAL DAILY PRN
Status: DISCONTINUED | OUTPATIENT
Start: 2025-06-24 | End: 2025-06-28 | Stop reason: HOSPADM

## 2025-06-24 RX ORDER — FERROUS SULFATE 325(65) MG
325 TABLET ORAL EVERY OTHER DAY
Status: DISCONTINUED | OUTPATIENT
Start: 2025-06-24 | End: 2025-06-28 | Stop reason: HOSPADM

## 2025-06-24 RX ORDER — SODIUM CHLORIDE 9 MG/ML
INJECTION, SOLUTION INTRAVENOUS PRN
Status: DISCONTINUED | OUTPATIENT
Start: 2025-06-24 | End: 2025-06-28 | Stop reason: HOSPADM

## 2025-06-24 RX ORDER — BUMETANIDE 0.25 MG/ML
2 INJECTION, SOLUTION INTRAMUSCULAR; INTRAVENOUS ONCE
Status: COMPLETED | OUTPATIENT
Start: 2025-06-24 | End: 2025-06-24

## 2025-06-24 RX ORDER — SODIUM CHLORIDE 0.9 % (FLUSH) 0.9 %
5-40 SYRINGE (ML) INJECTION 2 TIMES DAILY
Status: DISCONTINUED | OUTPATIENT
Start: 2025-06-24 | End: 2025-06-25 | Stop reason: HOSPADM

## 2025-06-24 RX ORDER — PROMETHAZINE HYDROCHLORIDE 12.5 MG/1
12.5 TABLET ORAL EVERY 6 HOURS PRN
Status: DISCONTINUED | OUTPATIENT
Start: 2025-06-24 | End: 2025-06-28 | Stop reason: HOSPADM

## 2025-06-24 RX ORDER — SODIUM CHLORIDE 0.9 % (FLUSH) 0.9 %
5-40 SYRINGE (ML) INJECTION EVERY 12 HOURS SCHEDULED
Status: DISCONTINUED | OUTPATIENT
Start: 2025-06-24 | End: 2025-06-28 | Stop reason: HOSPADM

## 2025-06-24 RX ORDER — METOPROLOL SUCCINATE 25 MG/1
12.5 TABLET, EXTENDED RELEASE ORAL DAILY
Status: DISCONTINUED | OUTPATIENT
Start: 2025-06-24 | End: 2025-06-28 | Stop reason: HOSPADM

## 2025-06-24 RX ORDER — BUMETANIDE 1 MG/1
2 TABLET ORAL 2 TIMES DAILY
Status: DISCONTINUED | OUTPATIENT
Start: 2025-06-24 | End: 2025-06-28 | Stop reason: HOSPADM

## 2025-06-24 RX ORDER — TAMSULOSIN HYDROCHLORIDE 0.4 MG/1
0.4 CAPSULE ORAL DAILY
Status: DISCONTINUED | OUTPATIENT
Start: 2025-06-24 | End: 2025-06-28 | Stop reason: HOSPADM

## 2025-06-24 RX ORDER — ONDANSETRON 4 MG/1
4 TABLET, ORALLY DISINTEGRATING ORAL EVERY 8 HOURS PRN
Status: DISCONTINUED | OUTPATIENT
Start: 2025-06-24 | End: 2025-06-24

## 2025-06-24 RX ORDER — ATORVASTATIN CALCIUM 40 MG/1
40 TABLET, FILM COATED ORAL DAILY
Status: DISCONTINUED | OUTPATIENT
Start: 2025-06-24 | End: 2025-06-28 | Stop reason: HOSPADM

## 2025-06-24 RX ORDER — INSULIN LISPRO 100 [IU]/ML
0-4 INJECTION, SOLUTION INTRAVENOUS; SUBCUTANEOUS EVERY 4 HOURS
Status: DISCONTINUED | OUTPATIENT
Start: 2025-06-24 | End: 2025-06-28 | Stop reason: HOSPADM

## 2025-06-24 RX ORDER — CHOLECALCIFEROL (VITAMIN D3) 50 MCG
2000 TABLET ORAL DAILY
Status: DISCONTINUED | OUTPATIENT
Start: 2025-06-24 | End: 2025-06-28 | Stop reason: HOSPADM

## 2025-06-24 RX ORDER — ACETAMINOPHEN 650 MG/1
650 SUPPOSITORY RECTAL EVERY 6 HOURS PRN
Status: DISCONTINUED | OUTPATIENT
Start: 2025-06-24 | End: 2025-06-28 | Stop reason: HOSPADM

## 2025-06-24 RX ORDER — METOLAZONE 5 MG/1
5 TABLET ORAL
Status: DISCONTINUED | OUTPATIENT
Start: 2025-06-25 | End: 2025-06-27

## 2025-06-24 RX ORDER — ACETAMINOPHEN 325 MG/1
650 TABLET ORAL EVERY 6 HOURS PRN
Status: DISCONTINUED | OUTPATIENT
Start: 2025-06-24 | End: 2025-06-28 | Stop reason: HOSPADM

## 2025-06-24 RX ORDER — ONDANSETRON 2 MG/ML
4 INJECTION INTRAMUSCULAR; INTRAVENOUS EVERY 6 HOURS PRN
Status: DISCONTINUED | OUTPATIENT
Start: 2025-06-24 | End: 2025-06-24

## 2025-06-24 RX ORDER — MIDODRINE HYDROCHLORIDE 5 MG/1
20 TABLET ORAL
Status: DISCONTINUED | OUTPATIENT
Start: 2025-06-25 | End: 2025-06-28 | Stop reason: HOSPADM

## 2025-06-24 RX ORDER — SODIUM CHLORIDE 0.9 % (FLUSH) 0.9 %
5-40 SYRINGE (ML) INJECTION PRN
Status: DISCONTINUED | OUTPATIENT
Start: 2025-06-24 | End: 2025-06-28 | Stop reason: HOSPADM

## 2025-06-24 RX ADMIN — ATORVASTATIN CALCIUM 40 MG: 40 TABLET, FILM COATED ORAL at 22:56

## 2025-06-24 RX ADMIN — TAMSULOSIN HYDROCHLORIDE 0.4 MG: 0.4 CAPSULE ORAL at 22:56

## 2025-06-24 RX ADMIN — BUMETANIDE 2 MG: 0.25 INJECTION INTRAMUSCULAR; INTRAVENOUS at 07:47

## 2025-06-24 RX ADMIN — FERROUS SULFATE TAB 325 MG (65 MG ELEMENTAL FE) 325 MG: 325 (65 FE) TAB at 22:57

## 2025-06-24 RX ADMIN — BUMETANIDE 2 MG: 1 TABLET ORAL at 22:57

## 2025-06-24 RX ADMIN — SODIUM CHLORIDE, PRESERVATIVE FREE 10 ML: 5 INJECTION INTRAVENOUS at 20:18

## 2025-06-24 RX ADMIN — Medication 2000 UNITS: at 22:57

## 2025-06-24 RX ADMIN — SERTRALINE 50 MG: 50 TABLET, FILM COATED ORAL at 22:58

## 2025-06-24 RX ADMIN — SODIUM CHLORIDE, PRESERVATIVE FREE 10 ML: 5 INJECTION INTRAVENOUS at 07:48

## 2025-06-24 ASSESSMENT — ENCOUNTER SYMPTOMS
DIARRHEA: 0
EYES NEGATIVE: 1
SHORTNESS OF BREATH: 1
CHEST TIGHTNESS: 0
WHEEZING: 0
ALLERGIC/IMMUNOLOGIC NEGATIVE: 1
COUGH: 0

## 2025-06-24 NOTE — H&P
of 4 L O2.  Pertinent labs showed elevated creatinine at 2.4, elevated proBNP at 6163.  CBC showed anemia hemoglobin 8.5, no leukocytosis WBC 6.2. EKG showed AV dual-paced rhythm. CXR shows small to moderate layering right pleural effusion with associated right basilar airspace disease, increased compared to May 28 study.    On physical examination patient appears hypervolemic.  Jugular venous distention noted, crackles on bilateral lung bases, abdomen distended with positive fluid shift, +3 bilateral lower leg pitting edema.    ED Meds:  ED Fluids:    Medications   sodium chloride flush 0.9 % injection 5-40 mL (has no administration in time range)   sodium chloride flush 0.9 % injection 5-40 mL (has no administration in time range)   0.9 % sodium chloride infusion (has no administration in time range)   ondansetron (ZOFRAN-ODT) disintegrating tablet 4 mg (has no administration in time range)     Or   ondansetron (ZOFRAN) injection 4 mg (has no administration in time range)   polyethylene glycol (GLYCOLAX) packet 17 g (has no administration in time range)   acetaminophen (TYLENOL) tablet 650 mg (has no administration in time range)     Or   acetaminophen (TYLENOL) suppository 650 mg (has no administration in time range)    None   Past Medical History:      Diagnosis Date    RADHA (acute kidney injury) 03/10/2022    Atrial fibrillation (HCC)     Carpal tunnel syndrome     Colorectal polyps 04/15/2013    Diverticula of colon 04/15/2013    Encounter regarding vascular access for dialysis for ESRD ([facility]) 03/12/2022    Hyperlipidemia     Hypertension     Hypothyroidism     Lung nodule     Lung nodules 04/15/2013    Nocturnal hypoxemia due to obesity 08/08/2013    Obesity     NIKO (obstructive sleep apnea) 08/08/2013    [facility]    Osteoarthritis     Pacemaker     DOI 10/3/2017 Medtronic; dual chamber; MRI conditional  Indication: Sinus node dysfunction     Pacemaker     DOI 10/3/2017  [employer]tronic; dual chamber;  Date/Time     2025 06:16 PM     Potassium:    Lab Results   Component Value Date/Time    K 5.1 2025 06:16 PM    K 5.1 2023 05:35 AM     PT/INR:    Lab Results   Component Value Date/Time    PROTIME 20.2 2025 06:16 PM    INR 1.8 2025 06:16 PM     Infectious   Temperature range: Temp  Av.1 °F (36.2 °C)  Min: 97.1 °F (36.2 °C)  Max: 97.1 °F (36.2 °C)  No results for input(s): \"WBC\", \"LACTA\" in the last 72 hours.    Results       Procedure Component Value Units Date/Time    Culture, Urine [[id number]]     Order Status: Sent Specimen: Urine, clean catch            COVID-19 Labs:  Lab Results   Component Value Date/Time    COVID19 Not Detected 05/15/2025 03:00 PM     Imaging studies     No orders to display      Medications   Continuous Infusions:   sodium chloride       Scheduled Meds:   sodium chloride flush  5-40 mL IntraVENous 2 times per day     PRN Meds: sodium chloride flush, sodium chloride, ondansetron **OR** ondansetron, polyethylene glycol, acetaminophen **OR** acetaminophen     Resident's Assessment and Plan     Assessment  Acute decompensated heart failure  Heart failure with preserved ejection fraction  Sinus node dysfunction status post dual-chamber placement and CRT-P (2025)  Persistent atrial fibrillation  [id number] = 3  Ascites  Acute on chronic kidney injury, (Baseline 1.7-1.9)  FEUrea 22%, pre-renal  Hypertension  Hyperlipidemia  Bilateral leg edema  Type 2 Diabetes Mellitus  Restrictive lung disease, 4 L O2 at baseline  Hypothyroidism  Anemia of chronic disease  History of nonsustained ventricular tachycardia  Vitamin D deficiency  Prolonged QTc  NIKO, on CPAP  Obesity    Plan  Daily labs; replace electrolytes as necessary  Daily weights, strict I's and O's  For IR paracentesis; INR 1.8; NPO at Midnight  Monitor BG; on LDSS Q4  Resume eliquis after paracentesis  Home meds resumed; atorvastatin 40 mg, bumex 2 mg BID, levothyroxine 150 mcg, metoprolol 12.5

## 2025-06-24 NOTE — PLAN OF CARE
Problem: Chronic Conditions and Co-morbidities  Goal: Patient's chronic conditions and co-morbidity symptoms are monitored and maintained or improved  Flowsheets (Taken 6/24/2025 9557)  Care Plan - Patient's Chronic Conditions and Co-Morbidity Symptoms are Monitored and Maintained or Improved: Monitor and assess patient's chronic conditions and comorbid symptoms for stability, deterioration, or improvement

## 2025-06-24 NOTE — PROGRESS NOTES
Congestive Heart Failure Clinic   [facility]       Referring Provider: -  Primary Care Physician: Oly Crespo MD   Cardiologist: Huseyin  Nephrologist: Kali      HISTORY OF PRESENT ILLNESS:     Wander Rocha is a 69 y.o. (1955) male with a history of HFpEF, most recent EF:  Lab Results   Component Value Date    LVEF 50 04/29/2025    LVEFMODE Echo 04/29/2025     He presents to the CHF clinic for ongoing evaluation and monitoring of heart failure.    In the CHF clinic today he denies any adverse symptoms except:  [x] Dizziness or lightheadedness   [] Syncope or near syncope  [] Recent Fall  [] Shortness of breath at rest   [x] Dyspnea with exertion  [] Decline in functional capacity (unable to perform activities they had previously been able to do)  [] Fatigue   [] Orthopnea  [] PND  [] Nocturnal cough  [] Hemoptysis  [] Chest pain, pressure, or discomfort  [] Palpitations  [x] Abdominal distention  [] Abdominal bloating  [] Early satiety  [] Blood in stool   [] Diarrhea  [] Constipation  [] Nausea/Vomiting  [x] Decreased urinary response to oral diuretic   [] Scrotal swelling   [x] Lower extremity edema  [] Used PRN doses of oral diuretic   [x] Weight gain       Wt Readings from Last 3 Encounters:   06/24/25 93 kg (205 lb)   06/16/25 92 kg (202 lb 12.8 oz)   06/12/25 86.6 kg (191 lb)       SOCIAL HISTORY:  [x] Denies tobacco, alcohol or illicit drug abuse  [] Tobacco use:  [] ETOH use:  [] Illicit drug use:        MEDICATIONS:    Allergies   Allergen Reactions    Jardiance [Empagliflozin] Other (See Comments)     Episode of euglycemic DKA 5/2025; no further benefit seen in continuing therapy       Current Outpatient Medications:     polyethylene glycol (GLYCOLAX) 17 g packet, Take 1 packet by mouth daily as needed, Disp: , Rfl:     docusate (COLACE, DULCOLAX) 100 MG CAPS, Take 100 mg by mouth 2 times daily, Disp: , Rfl:     apixaban (ELIQUIS) 5 MG TABS tablet, Take 1

## 2025-06-25 ENCOUNTER — APPOINTMENT (OUTPATIENT)
Dept: INTERVENTIONAL RADIOLOGY/VASCULAR | Age: 70
DRG: 291 | End: 2025-06-25
Payer: MEDICARE

## 2025-06-25 ENCOUNTER — APPOINTMENT (OUTPATIENT)
Dept: ULTRASOUND IMAGING | Age: 70
DRG: 291 | End: 2025-06-25
Payer: MEDICARE

## 2025-06-25 ENCOUNTER — HOSPITAL ENCOUNTER (INPATIENT)
Age: 70
LOS: 2 days | Discharge: HOME | DRG: 291 | End: 2025-06-28
Attending: INTERNAL MEDICINE | Admitting: INTERNAL MEDICINE
Payer: MEDICARE

## 2025-06-25 ENCOUNTER — APPOINTMENT (OUTPATIENT)
Dept: GENERAL RADIOLOGY | Age: 70
DRG: 291 | End: 2025-06-25
Payer: MEDICARE

## 2025-06-25 DIAGNOSIS — N17.9 AKI (ACUTE KIDNEY INJURY): Primary | ICD-10-CM

## 2025-06-25 DIAGNOSIS — N17.9 ACUTE KIDNEY INJURY: ICD-10-CM

## 2025-06-25 LAB
25(OH)D3 SERPL-MCNC: 48.1 NG/ML (ref 30–100)
ALBUMIN SERPL-MCNC: 3.4 G/DL (ref 3.5–5.2)
ANION GAP SERPL CALCULATED.3IONS-SCNC: 14 MMOL/L (ref 7–16)
ANION GAP SERPL CALCULATED.3IONS-SCNC: 15 MMOL/L (ref 7–16)
B PARAP IS1001 DNA NPH QL NAA+NON-PROBE: NOT DETECTED
B PERT DNA SPEC QL NAA+PROBE: NOT DETECTED
BASOPHILS # BLD: 0.04 K/UL (ref 0–0.2)
BASOPHILS NFR BLD: 1 % (ref 0–2)
BUN SERPL-MCNC: 66 MG/DL (ref 8–23)
BUN SERPL-MCNC: 68 MG/DL (ref 8–23)
C PNEUM DNA NPH QL NAA+NON-PROBE: NOT DETECTED
CALCIUM SERPL-MCNC: 9.3 MG/DL (ref 8.8–10.2)
CALCIUM SERPL-MCNC: 9.6 MG/DL (ref 8.8–10.2)
CHLORIDE SERPL-SCNC: 99 MMOL/L (ref 98–107)
CHLORIDE SERPL-SCNC: 99 MMOL/L (ref 98–107)
CO2 SERPL-SCNC: 26 MMOL/L (ref 22–29)
CO2 SERPL-SCNC: 26 MMOL/L (ref 22–29)
CREAT SERPL-MCNC: 2.1 MG/DL (ref 0.7–1.2)
CREAT SERPL-MCNC: 2.3 MG/DL (ref 0.7–1.2)
EOSINOPHIL # BLD: 0.36 K/UL (ref 0.05–0.5)
EOSINOPHILS RELATIVE PERCENT: 6 % (ref 0–6)
ERYTHROCYTE [DISTWIDTH] IN BLOOD BY AUTOMATED COUNT: 18.6 % (ref 11.5–15)
FLUAV RNA NPH QL NAA+NON-PROBE: NOT DETECTED
FLUBV RNA NPH QL NAA+NON-PROBE: NOT DETECTED
GFR, ESTIMATED: 30 ML/MIN/1.73M2
GFR, ESTIMATED: 34 ML/MIN/1.73M2
GLUCOSE BLD-MCNC: 150 MG/DL (ref 74–99)
GLUCOSE BLD-MCNC: 162 MG/DL (ref 74–99)
GLUCOSE BLD-MCNC: 171 MG/DL (ref 74–99)
GLUCOSE BLD-MCNC: 192 MG/DL (ref 74–99)
GLUCOSE SERPL-MCNC: 147 MG/DL (ref 74–99)
GLUCOSE SERPL-MCNC: 155 MG/DL (ref 74–99)
HADV DNA NPH QL NAA+NON-PROBE: NOT DETECTED
HAV IGM SERPL QL IA: NONREACTIVE
HBV CORE IGM SERPL QL IA: NONREACTIVE
HBV SURFACE AB SERPL IA-ACNC: <3.5 MIU/ML (ref 0–9.99)
HBV SURFACE AG SERPL QL IA: NONREACTIVE
HCOV 229E RNA NPH QL NAA+NON-PROBE: NOT DETECTED
HCOV HKU1 RNA NPH QL NAA+NON-PROBE: NOT DETECTED
HCOV NL63 RNA NPH QL NAA+NON-PROBE: NOT DETECTED
HCOV OC43 RNA NPH QL NAA+NON-PROBE: NOT DETECTED
HCT VFR BLD AUTO: 25.9 % (ref 37–54)
HCV AB SERPL QL IA: NONREACTIVE
HGB BLD-MCNC: 8.4 G/DL (ref 12.5–16.5)
HMPV RNA NPH QL NAA+NON-PROBE: NOT DETECTED
HPIV1 RNA NPH QL NAA+NON-PROBE: NOT DETECTED
HPIV2 RNA NPH QL NAA+NON-PROBE: NOT DETECTED
HPIV3 RNA NPH QL NAA+NON-PROBE: NOT DETECTED
HPIV4 RNA NPH QL NAA+NON-PROBE: NOT DETECTED
IMM GRANULOCYTES # BLD AUTO: 0.07 K/UL (ref 0–0.58)
IMM GRANULOCYTES NFR BLD: 1 % (ref 0–5)
LYMPHOCYTES NFR BLD: 0.55 K/UL (ref 1.5–4)
LYMPHOCYTES RELATIVE PERCENT: 9 % (ref 20–42)
M PNEUMO DNA NPH QL NAA+NON-PROBE: NOT DETECTED
MAGNESIUM SERPL-MCNC: 2.2 MG/DL (ref 1.6–2.4)
MCH RBC QN AUTO: 31 PG (ref 26–35)
MCHC RBC AUTO-ENTMCNC: 32.4 G/DL (ref 32–34.5)
MCV RBC AUTO: 95.6 FL (ref 80–99.9)
MICROORGANISM SPEC CULT: NO GROWTH
MONOCYTES NFR BLD: 0.48 K/UL (ref 0.1–0.95)
MONOCYTES NFR BLD: 8 % (ref 2–12)
NEUTROPHILS NFR BLD: 76 % (ref 43–80)
NEUTS SEG NFR BLD: 4.72 K/UL (ref 1.8–7.3)
PHOSPHATE SERPL-MCNC: 5.6 MG/DL (ref 2.5–4.5)
PLATELET # BLD AUTO: 127 K/UL (ref 130–450)
PMV BLD AUTO: 10.2 FL (ref 7–12)
POTASSIUM SERPL-SCNC: 4.1 MMOL/L (ref 3.5–5.1)
POTASSIUM SERPL-SCNC: 4.1 MMOL/L (ref 3.5–5.1)
RBC # BLD AUTO: 2.71 M/UL (ref 3.8–5.8)
RBC # BLD: ABNORMAL 10*6/UL
RSV RNA NPH QL NAA+NON-PROBE: NOT DETECTED
RV+EV RNA NPH QL NAA+NON-PROBE: NOT DETECTED
SARS-COV-2 RNA NPH QL NAA+NON-PROBE: NOT DETECTED
SERVICE CMNT-IMP: NORMAL
SODIUM SERPL-SCNC: 139 MMOL/L (ref 136–145)
SODIUM SERPL-SCNC: 139 MMOL/L (ref 136–145)
SPECIMEN DESCRIPTION: NORMAL
SPECIMEN DESCRIPTION: NORMAL
TSH SERPL DL<=0.05 MIU/L-ACNC: 8.51 UIU/ML (ref 0.27–4.2)
WBC OTHER # BLD: 6.2 K/UL (ref 4.5–11.5)

## 2025-06-25 PROCEDURE — 0W9G3ZZ DRAINAGE OF PERITONEAL CAVITY, PERCUTANEOUS APPROACH: ICD-10-PCS | Performed by: INTERNAL MEDICINE

## 2025-06-25 PROCEDURE — 6370000000 HC RX 637 (ALT 250 FOR IP)

## 2025-06-25 PROCEDURE — 76705 ECHO EXAM OF ABDOMEN: CPT

## 2025-06-25 PROCEDURE — 71045 X-RAY EXAM CHEST 1 VIEW: CPT

## 2025-06-25 PROCEDURE — 99232 SBSQ HOSP IP/OBS MODERATE 35: CPT | Performed by: INTERNAL MEDICINE

## 2025-06-25 PROCEDURE — 36415 COLL VENOUS BLD VENIPUNCTURE: CPT

## 2025-06-25 PROCEDURE — 80048 BASIC METABOLIC PNL TOTAL CA: CPT

## 2025-06-25 PROCEDURE — 82962 GLUCOSE BLOOD TEST: CPT

## 2025-06-25 PROCEDURE — 85025 COMPLETE CBC W/AUTO DIFF WBC: CPT

## 2025-06-25 PROCEDURE — 82306 VITAMIN D 25 HYDROXY: CPT

## 2025-06-25 PROCEDURE — 2500000003 HC RX 250 WO HCPCS

## 2025-06-25 PROCEDURE — 86317 IMMUNOASSAY INFECTIOUS AGENT: CPT

## 2025-06-25 PROCEDURE — 0202U NFCT DS 22 TRGT SARS-COV-2: CPT

## 2025-06-25 PROCEDURE — G0378 HOSPITAL OBSERVATION PER HR: HCPCS

## 2025-06-25 PROCEDURE — 80074 ACUTE HEPATITIS PANEL: CPT

## 2025-06-25 PROCEDURE — 84100 ASSAY OF PHOSPHORUS: CPT

## 2025-06-25 PROCEDURE — 2580000003 HC RX 258

## 2025-06-25 PROCEDURE — 83735 ASSAY OF MAGNESIUM: CPT

## 2025-06-25 PROCEDURE — 76700 US EXAM ABDOM COMPLETE: CPT

## 2025-06-25 PROCEDURE — 82040 ASSAY OF SERUM ALBUMIN: CPT

## 2025-06-25 PROCEDURE — 6360000002 HC RX W HCPCS

## 2025-06-25 PROCEDURE — 84443 ASSAY THYROID STIM HORMONE: CPT

## 2025-06-25 RX ADMIN — BUMETANIDE 0.2 MG/HR: 0.25 INJECTION INTRAMUSCULAR; INTRAVENOUS at 13:33

## 2025-06-25 RX ADMIN — TAMSULOSIN HYDROCHLORIDE 0.4 MG: 0.4 CAPSULE ORAL at 09:53

## 2025-06-25 RX ADMIN — MIDODRINE HYDROCHLORIDE 20 MG: 5 TABLET ORAL at 13:01

## 2025-06-25 RX ADMIN — METOPROLOL SUCCINATE 12.5 MG: 25 TABLET, EXTENDED RELEASE ORAL at 09:54

## 2025-06-25 RX ADMIN — MIDODRINE HYDROCHLORIDE 20 MG: 5 TABLET ORAL at 18:17

## 2025-06-25 RX ADMIN — ATORVASTATIN CALCIUM 40 MG: 40 TABLET, FILM COATED ORAL at 09:54

## 2025-06-25 RX ADMIN — LEVOTHYROXINE SODIUM 150 MCG: 0.1 TABLET ORAL at 09:53

## 2025-06-25 RX ADMIN — Medication 2000 UNITS: at 09:53

## 2025-06-25 RX ADMIN — SERTRALINE 50 MG: 50 TABLET, FILM COATED ORAL at 09:54

## 2025-06-25 RX ADMIN — BUMETANIDE 2 MG: 1 TABLET ORAL at 09:54

## 2025-06-25 RX ADMIN — SODIUM CHLORIDE, PRESERVATIVE FREE 10 ML: 5 INJECTION INTRAVENOUS at 20:47

## 2025-06-25 RX ADMIN — MIDODRINE HYDROCHLORIDE 20 MG: 5 TABLET ORAL at 09:54

## 2025-06-25 RX ADMIN — INSULIN LISPRO 1 UNITS: 100 INJECTION, SOLUTION INTRAVENOUS; SUBCUTANEOUS at 13:27

## 2025-06-25 RX ADMIN — SODIUM CHLORIDE, PRESERVATIVE FREE 10 ML: 5 INJECTION INTRAVENOUS at 09:56

## 2025-06-25 ASSESSMENT — PAIN SCALES - GENERAL
PAINLEVEL_OUTOF10: 0

## 2025-06-25 ASSESSMENT — PAIN SCALES - WONG BAKER
WONGBAKER_NUMERICALRESPONSE: NO HURT
WONGBAKER_NUMERICALRESPONSE: NO HURT

## 2025-06-25 NOTE — ED PROVIDER NOTES
HPI:  6/24/25, Time: 9:00 PM EDT         Wander Rocha is a 69 y.o. male presenting to the ED for abnormal lab.  Patient presents with the CHF clinic.  Had routine labs drawn today, was found to be in acute on chronic renal failure.  He has no symptoms or complaints.  No chest pain or shortness of breath.  No fever or chills.  No cough or sputum.  No paresthesias.  No other symptoms or complaints.    Review of Systems:   A complete review of systems was performed and pertinent positives and negatives are stated within HPI, all other systems reviewed and are negative.          --------------------------------------------- PAST HISTORY ---------------------------------------------  Past Medical History:  has a past medical history of RADHA (acute kidney injury), Atrial fibrillation (HCC), Carpal tunnel syndrome, Colorectal polyps, Diverticula of colon, Encounter regarding vascular access for dialysis for ESRD (HCC), Hyperlipidemia, Hypertension, Hypothyroidism, Lung nodule, Lung nodules, Nocturnal hypoxemia due to obesity, Obesity, NIKO (obstructive sleep apnea), Osteoarthritis, Pacemaker, Pacemaker, Pinched nerve, Restrictive lung disease secondary to obesity, S/P laminectomy with spinal fusion, Sinus node dysfunction (HCC), Skin lesion of face, and Type II or unspecified type diabetes mellitus without mention of complication, not stated as uncontrolled.    Past Surgical History:  has a past surgical history that includes Colonoscopy (2007); back surgery (2012); eye surgery; hernia repair; Colonoscopy (06/04/2012); back surgery (05/30/2017); pacemaker placement (10/03/2017); Rotator cuff repair (Right); Colonoscopy (04/26/2022); Colonoscopy (N/A, 4/26/2022); back surgery (N/A, 11/8/2022); back surgery (Left, 1/26/2023); ep device procedure (N/A, 4/17/2025); ep device procedure (N/A, 4/23/2025); and IR TUNNELED CVC PLACE WO SQ PORT/PUMP > 5 YEARS (5/20/2025).    Social History:  reports that he quit smoking about 20

## 2025-06-25 NOTE — PROCEDURES
PROCEDURE NOTE  Date: 6/25/2025   Name: Wander Rocha  YOB: 1955    Procedures paracentesis      Patient arrived to radiology department for paracentesis.  Allergies, medications, H&P and fasting instructions reviewed. Vital signs taken. IV flushed and prn adapter attached. Procedural instructions given, questions answered, understanding expressed and consent signed. No questions at this time

## 2025-06-25 NOTE — PROGRESS NOTES
Admission database completed at this time, [facility] rec reviewed and care plan initiated.

## 2025-06-25 NOTE — PLAN OF CARE
Problem: ABCDS Injury Assessment  Goal: Absence of physical injury  6/25/2025 1047 by Yari Perkins, RN  Outcome: Progressing  6/25/2025 1046 by Yari Perkins, RN  Outcome: Progressing  Flowsheets (Taken 6/25/2025 1042)  Absence of Physical Injury: Implement safety measures based on patient assessment     Problem: Skin/Tissue Integrity  Goal: Skin integrity remains intact  Description: 1.  Monitor for areas of redness and/or skin breakdown  2.  Assess vascular access sites hourly  3.  Every 4-6 hours minimum:  Change oxygen saturation probe site  4.  Every 4-6 hours:  If on nasal continuous positive airway pressure, respiratory therapy assess nares and determine need for appliance change or resting period  6/25/2025 1047 by Yari Perkins, RN  Outcome: Progressing  6/25/2025 1046 by Yari Perkins, RN  Outcome: Progressing

## 2025-06-25 NOTE — PLAN OF CARE
Problem: ABCDS Injury Assessment  Goal: Absence of physical injury  Outcome: Progressing  Flowsheets (Taken 6/25/2025 1042)  Absence of Physical Injury: Implement safety measures based on patient assessment     Problem: Skin/Tissue Integrity  Goal: Skin integrity remains intact  Description: 1.  Monitor for areas of redness and/or skin breakdown  2.  Assess vascular access sites hourly  3.  Every 4-6 hours minimum:  Change oxygen saturation probe site  4.  Every 4-6 hours:  If on nasal continuous positive airway pressure, respiratory therapy assess nares and determine need for appliance change or resting period  Outcome: Progressing

## 2025-06-25 NOTE — CONSULTS
Department of Internal Medicine  Nephrology Attending Consult Note      Reason for Consult:  RADHA  Requesting Physician:  Andrade Cruz MD     CHIEF COMPLAINT: Abnormal lab    History Obtained From:  patient, electronic medical record    HISTORY OF PRESENT ILLNESS:  Briefly, Wander Rocha is a 69-year-old male known to us, past medical history CKD stage 3b probably 2/2 nephrosclerosis and previous episode of ischemic ATN requiring temporary HD in May 2017, recurrent episode of ischemic ATN in March 2022 also requiring HD x2 with fair recovery of renal function, had dialysis again in May 2025 baseline creatinine 1.7 mg/dL, HTN, DM type II, A. fib on apixaban, HFpEF 50-55%, NIKO, pacemaker placement, hypothyroidism, hyperlipidemia, who was admitted on 6/25/2025 for hypoxia.  Lab work showed creatinine 2.3 mg/dL, proBNP 6163, reason for this consultation.  Chest x-ray showed small bilateral pleural effusions.    Past Medical History:        Diagnosis Date    RADHA (acute kidney injury) 03/10/2022    Atrial fibrillation (HCC)     Carpal tunnel syndrome     Colorectal polyps 04/15/2013    Diverticula of colon 04/15/2013    Encounter regarding vascular access for dialysis for ESRD (HCC) 03/12/2022    Hyperlipidemia     Hypertension     Hypothyroidism     Lung nodule     Lung nodules 04/15/2013    Nocturnal hypoxemia due to obesity 08/08/2013    Obesity     NIKO (obstructive sleep apnea) 08/08/2013    [facility]    Osteoarthritis     Pacemaker     DOI 10/3/2017 [employer]; dual chamber; MRI conditional  Indication: Sinus node dysfunction     Pacemaker     DOI 10/3/2017  [employer]; dual chamber; MRI conditional   Indication: Sinus node dysfunction       Pinched nerve     Lumbar    Restrictive lung disease secondary to obesity 07/25/2016    S/P laminectomy with spinal fusion 04/15/2013    Done at [facility], March 2012     Sinus node dysfunction (HCC)     Skin lesion of face 11/16/2022    Type II or  06/25/2025 08:04 AM     Radiology Review:    Chest x-ray 6/25/2025    IMPRESSION:  Small to moderate layering right pleural effusion with associated right  basilar airspace disease.  Trace left pleural effusion.      IMPRESSION/RECOMMENDATIONS:      Briefly, Wander Rocha is a 69-year-old male known to us, past medical history CKD stage 3b probably 2/2 nephrosclerosis and previous episode of ischemic ATN requiring temporary HD in May 2017, recurrent episode of ischemic ATN in March 2022 also requiring HD x2 with fair recovery of renal function, had dialysis again in May 2025 baseline creatinine 1.7 mg/dL, HTN, DM type II, A. fib on apixaban, HFpEF 50-55%, NIKO, pacemaker placement, hypothyroidism, hyperlipidemia, who was admitted on 6/25/2025 for hypoxia.  Lab work showed creatinine 2.3 mg/dL, proBNP 6163, reason for this consultation.  Chest x-ray showed small bilateral pleural effusions.    RADHA stage II on CKD likely 2/2 hemodynamically mediated acute decompensated heart failure, creatinine stable at 2.3 mg/dL, continue Bumex drip    CKD stage 3b probably 2/2 nephrosclerosis and previous recurrent episodes of ischemic ATN requiring dialysis,  baseline creatinine 1.7 mg/dL  HFmEF 45% improved 50-55%, proBNP 6163  Normocytic anemia, ferritin 112, iron saturation 8%, folate 8.4, B12 687, on epoetin lay  --------------------------------  Persistent atrial fibrillation, on apixaban  Pacemaker insertion 4/23/2025  NIKO  Hypothyroidism, on levothyroxine  Hyperlipidemia, on atorvastatin  Type II DM, on SSI     Plan:     Start Bumex drip at 0.2 mg/h  Continue midodrine 20 mg p.o. 3 times daily  Continue epoetin lay 10,000 units weekly  Monitor renal function  Monitor blood pressure    Thank you Dr. Cruz for allowing us to participate in the care of Wander Rocha.      Electronically signed by JACQUELINE Martinez CNP on 6/25/2025 at 1:44 PM     I saw and evaluated the patient, performing the key elements of the service.

## 2025-06-25 NOTE — PROGRESS NOTES
Discussed patient and IR procedure with bedside RN, all questions answered. Will call if able to send for patient, patient is an add on case. He does not need to be NPO for the paracentesis.

## 2025-06-25 NOTE — PROGRESS NOTES
[facility]  Internal Medicine Residency Program  Progress Note - House Team 1    Patient:  Wander Rocha 69 y.o. male MRN: [id number]     Date of Service: 6/25/2025     CC: hypoxia    Days since admission: 0    Subjective     Overnight events: No acute overnight events    He was seen and examined at bedside this morning. He is awake, alert, and oriented x 3. He complains of shortness of breath. He is on 4L/min via nasal cannula. He said he does not recall if he was able to take his diuretics at home. He also complains of urinary retention.     Objective     VS: /62   Pulse 63   Temp 97.2 °F (36.2 °C) (Oral)   Resp 19   SpO2 97%         I & O - 24hr: No intake or output data in the 24 hours ending 06/25/25 0830  Net IO Since Admission: No IO data has been entered for this period [06/25/25 0830]    Physical Exam:  General Appearance: in mild distress, conversant  HEENT: Normocephalic, atraumatic (+) JVD  Neck: midline trachea  Lung: crackles bilaterally  Heart: regular rate and rhythm, no murmur  Abdomen: distended, tympanitic  Extremities: 3+ bipedal edema  Musculokeletal: No joint swelling  Neurologic: Mental status: awake, alert, and oriented    Interventions     Procedures: None    Airway: Nasal cannula at 5 L/min    Medications     Continuous Infusions:   sodium chloride       Scheduled Meds:   sodium chloride flush  5-40 mL IntraVENous 2 times per day    insulin lispro  0-4 Units SubCUTAneous Q4H    [Held by provider] apixaban  5 mg Oral BID    atorvastatin  40 mg Oral Daily    bumetanide  2 mg Oral BID    ferrous sulfate  325 mg Oral Every Other Day    levothyroxine  150 mcg Oral Daily    metOLazone  5 mg Oral Once per day on Monday Wednesday Friday    metoprolol succinate  12.5 mg Oral Daily    midodrine  20 mg Oral TID WC    sertraline  50 mg Oral Daily    tamsulosin  0.4 mg Oral Daily    vitamin D  2,000 Units Oral Daily     PRN Meds: sodium chloride flush, sodium chloride,

## 2025-06-25 NOTE — PROCEDURES
Procedure Note  ______________________________________________________________      LIMITED U/S ABDOMEN FOR POSS. PARACENTESIS  [facility]WE IMCU    Patient Name: Wander Rocha   YOB: 1955  Medical Record Number: [id number]  Date of Procedure: 6/25/25  Room/Bed: [address] [place] [address]      Pre-operative Diagnosis: Ascites    Post-operative Diagnosis: minimal ascites insufficient for paracentesis.    Consent: Informed consent was obtained from the patient prior to the procedure. The details of the procedure, as well is its risks, benefits, and alternatives, were explained.      Performed by: CURTIS Gomez under on-site supervision by Karina Gandhi MD.    Procedure: Routine scanning of all four abdominal quadrants was performed using real-time ultrasound and revealed no visible amount of ascites fluid present.  Decision was made not to proceed with procedure. (See radiology dictation in PACs for image review and additional procedural information)    Complications: None    Specimen Obtained: None    Thank you for allowing Interventional Radiology to participate in the care of Wander Rocha.     Electronically signed by CURTIS Gmoez   DD: 6/25/25  5:29 PM

## 2025-06-25 NOTE — PLAN OF CARE
Problem: ABCDS Injury Assessment  Goal: Absence of physical injury  6/25/2025 1812 by Khalida Rosario, RN  Outcome: Progressing  6/25/2025 1047 by Yari Perkins RN  Outcome: Progressing  6/25/2025 1046 by Yari Perkins, RN  Outcome: Progressing  Flowsheets (Taken 6/25/2025 1042)  Absence of Physical Injury: Implement safety measures based on patient assessment     Problem: Skin/Tissue Integrity  Goal: Skin integrity remains intact  Description: 1.  Monitor for areas of redness and/or skin breakdown  2.  Assess vascular access sites hourly  3.  Every 4-6 hours minimum:  Change oxygen saturation probe site  4.  Every 4-6 hours:  If on nasal continuous positive airway pressure, respiratory therapy assess nares and determine need for appliance change or resting period  6/25/2025 1812 by Khalida Rosario, RN  Outcome: Progressing  Flowsheets  Taken 6/25/2025 1811  Skin Integrity Remains Intact: Monitor for areas of redness and/or skin breakdown  Taken 6/25/2025 1230  Skin Integrity Remains Intact: Monitor for areas of redness and/or skin breakdown  6/25/2025 1047 by Yari Perkins, RN  Outcome: Progressing  6/25/2025 1046 by Yari Perkins, RN  Outcome: Progressing     Problem: Safety - Adult  Goal: Free from fall injury  Outcome: Progressing     Problem: Chronic Conditions and Co-morbidities  Goal: Patient's chronic conditions and co-morbidity symptoms are monitored and maintained or improved  Outcome: Progressing     Problem: Discharge Planning  Goal: Discharge to home or other facility with appropriate resources  Outcome: Progressing

## 2025-06-25 NOTE — PROGRESS NOTES
4 Eyes Skin Assessment     NAME:  Wander Rocha  YOB: 1955  MEDICAL RECORD NUMBER:  [id number]    The patient is being assessed for  Admission    I agree that at least one RN has performed a thorough Head to Toe Skin Assessment on the patient. ALL assessment sites listed below have been assessed.      Areas assessed by both nurses:    {Pressure Areas Assessed:44026}        Does the Patient have a Wound? No noted wound(s)       Aric Prevention initiated by RN: No  Wound Care Orders initiated by RN: No    Pressure Injury (Stage 3,4, Unstageable, DTI, NWPT, and Complex wounds) if present, place Wound referral order by RN under : No    New Ostomies, if present place, Ostomy referral order under : No     Nurse 1 eSignature: Electronically signed by Zarina Harvey RN on 6/25/25 at 2:18 PM EDT    **SHARE this note so that the co-signing nurse can place an eSignature**    Nurse 2 eSignature: {Esignature:[id number]}

## 2025-06-26 LAB
ANION GAP SERPL CALCULATED.3IONS-SCNC: 12 MMOL/L (ref 7–16)
ANION GAP SERPL CALCULATED.3IONS-SCNC: 13 MMOL/L (ref 7–16)
B.E.: 2.3 MMOL/L (ref -3–3)
BASOPHILS # BLD: 0 K/UL (ref 0–0.2)
BASOPHILS NFR BLD: 0 % (ref 0–2)
BUN SERPL-MCNC: 64 MG/DL (ref 8–23)
BUN SERPL-MCNC: 66 MG/DL (ref 8–23)
CALCIUM SERPL-MCNC: 9.2 MG/DL (ref 8.8–10.2)
CALCIUM SERPL-MCNC: 9.6 MG/DL (ref 8.8–10.2)
CHLORIDE SERPL-SCNC: 94 MMOL/L (ref 98–107)
CHLORIDE SERPL-SCNC: 98 MMOL/L (ref 98–107)
CO2 SERPL-SCNC: 26 MMOL/L (ref 22–29)
CO2 SERPL-SCNC: 27 MMOL/L (ref 22–29)
COHB: 0.4 % (ref 0–1.5)
CREAT SERPL-MCNC: 2 MG/DL (ref 0.7–1.2)
CREAT SERPL-MCNC: 2.1 MG/DL (ref 0.7–1.2)
CRITICAL: ABNORMAL
DATE ANALYZED: ABNORMAL
DATE OF COLLECTION: ABNORMAL
EKG ATRIAL RATE: 65 BPM
EKG P-R INTERVAL: 172 MS
EKG Q-T INTERVAL: 492 MS
EKG QRS DURATION: 170 MS
EKG QTC CALCULATION (BAZETT): 507 MS
EKG R AXIS: 115 DEGREES
EKG T AXIS: 72 DEGREES
EKG VENTRICULAR RATE: 64 BPM
EOSINOPHIL # BLD: 0.33 K/UL (ref 0.05–0.5)
EOSINOPHILS RELATIVE PERCENT: 6 % (ref 0–6)
ERYTHROCYTE [DISTWIDTH] IN BLOOD BY AUTOMATED COUNT: 18.6 % (ref 11.5–15)
GFR, ESTIMATED: 33 ML/MIN/1.73M2
GFR, ESTIMATED: 35 ML/MIN/1.73M2
GLUCOSE BLD-MCNC: 149 MG/DL (ref 74–99)
GLUCOSE BLD-MCNC: 181 MG/DL (ref 74–99)
GLUCOSE BLD-MCNC: 183 MG/DL (ref 74–99)
GLUCOSE BLD-MCNC: 198 MG/DL (ref 74–99)
GLUCOSE SERPL-MCNC: 136 MG/DL (ref 74–99)
GLUCOSE SERPL-MCNC: 179 MG/DL (ref 74–99)
HCO3: 27.3 MMOL/L (ref 22–26)
HCT VFR BLD AUTO: 24.6 % (ref 37–54)
HGB BLD-MCNC: 7.6 G/DL (ref 12.5–16.5)
HHB: 5.4 % (ref 0–5)
LAB: ABNORMAL
LYMPHOCYTES NFR BLD: 0.62 K/UL (ref 1.5–4)
LYMPHOCYTES RELATIVE PERCENT: 11 % (ref 20–42)
Lab: 1508
MAGNESIUM SERPL-MCNC: 2 MG/DL (ref 1.6–2.4)
MCH RBC QN AUTO: 29.5 PG (ref 26–35)
MCHC RBC AUTO-ENTMCNC: 30.9 G/DL (ref 32–34.5)
MCV RBC AUTO: 95.3 FL (ref 80–99.9)
METHB: 0.5 % (ref 0–1.5)
MODE: ABNORMAL
MONOCYTES NFR BLD: 0.14 K/UL (ref 0.1–0.95)
MONOCYTES NFR BLD: 3 % (ref 2–12)
NEUTROPHILS NFR BLD: 80 % (ref 43–80)
NEUTS SEG NFR BLD: 4.31 K/UL (ref 1.8–7.3)
O2 SATURATION: 94.6 % (ref 92–98.5)
O2HB: 93.7 % (ref 94–97)
OPERATOR ID: 1868
PATIENT TEMP: 37 C
PCO2: 44.2 MMHG (ref 35–45)
PH BLOOD GAS: 7.41 (ref 7.35–7.45)
PLATELET # BLD AUTO: 123 K/UL (ref 130–450)
PMV BLD AUTO: 9.5 FL (ref 7–12)
PO2: 72.3 MMHG (ref 75–100)
POTASSIUM SERPL-SCNC: 4 MMOL/L (ref 3.5–5.1)
POTASSIUM SERPL-SCNC: 4 MMOL/L (ref 3.5–5.1)
RBC # BLD AUTO: 2.58 M/UL (ref 3.8–5.8)
RBC # BLD: ABNORMAL 10*6/UL
SODIUM SERPL-SCNC: 134 MMOL/L (ref 136–145)
SODIUM SERPL-SCNC: 137 MMOL/L (ref 136–145)
SOURCE, BLOOD GAS: ABNORMAL
THB: 10.2 G/DL (ref 11.5–16.5)
TIME ANALYZED: 1527
WBC OTHER # BLD: 5.4 K/UL (ref 4.5–11.5)

## 2025-06-26 PROCEDURE — 82805 BLOOD GASES W/O2 SATURATION: CPT

## 2025-06-26 PROCEDURE — 6360000002 HC RX W HCPCS

## 2025-06-26 PROCEDURE — 93010 ELECTROCARDIOGRAM REPORT: CPT | Performed by: INTERNAL MEDICINE

## 2025-06-26 PROCEDURE — 36415 COLL VENOUS BLD VENIPUNCTURE: CPT

## 2025-06-26 PROCEDURE — 99232 SBSQ HOSP IP/OBS MODERATE 35: CPT | Performed by: INTERNAL MEDICINE

## 2025-06-26 PROCEDURE — 6370000000 HC RX 637 (ALT 250 FOR IP)

## 2025-06-26 PROCEDURE — 2580000003 HC RX 258

## 2025-06-26 PROCEDURE — 80048 BASIC METABOLIC PNL TOTAL CA: CPT

## 2025-06-26 PROCEDURE — 82962 GLUCOSE BLOOD TEST: CPT

## 2025-06-26 PROCEDURE — 97530 THERAPEUTIC ACTIVITIES: CPT

## 2025-06-26 PROCEDURE — 97161 PT EVAL LOW COMPLEX 20 MIN: CPT

## 2025-06-26 PROCEDURE — 97535 SELF CARE MNGMENT TRAINING: CPT

## 2025-06-26 PROCEDURE — 2060000000 HC ICU INTERMEDIATE R&B

## 2025-06-26 PROCEDURE — 97165 OT EVAL LOW COMPLEX 30 MIN: CPT

## 2025-06-26 PROCEDURE — 85025 COMPLETE CBC W/AUTO DIFF WBC: CPT

## 2025-06-26 PROCEDURE — 83735 ASSAY OF MAGNESIUM: CPT

## 2025-06-26 RX ADMIN — BUMETANIDE 0.2 MG/HR: 0.25 INJECTION INTRAMUSCULAR; INTRAVENOUS at 09:33

## 2025-06-26 RX ADMIN — LEVOTHYROXINE SODIUM 150 MCG: 0.1 TABLET ORAL at 08:43

## 2025-06-26 RX ADMIN — INSULIN LISPRO 1 UNITS: 100 INJECTION, SOLUTION INTRAVENOUS; SUBCUTANEOUS at 21:16

## 2025-06-26 RX ADMIN — SERTRALINE 50 MG: 50 TABLET, FILM COATED ORAL at 08:43

## 2025-06-26 RX ADMIN — INSULIN LISPRO 1 UNITS: 100 INJECTION, SOLUTION INTRAVENOUS; SUBCUTANEOUS at 12:06

## 2025-06-26 RX ADMIN — ATORVASTATIN CALCIUM 40 MG: 40 TABLET, FILM COATED ORAL at 08:43

## 2025-06-26 RX ADMIN — MIDODRINE HYDROCHLORIDE 20 MG: 5 TABLET ORAL at 08:44

## 2025-06-26 RX ADMIN — TAMSULOSIN HYDROCHLORIDE 0.4 MG: 0.4 CAPSULE ORAL at 08:43

## 2025-06-26 RX ADMIN — MIDODRINE HYDROCHLORIDE 20 MG: 5 TABLET ORAL at 12:06

## 2025-06-26 RX ADMIN — METOPROLOL SUCCINATE 12.5 MG: 25 TABLET, EXTENDED RELEASE ORAL at 08:43

## 2025-06-26 RX ADMIN — FERROUS SULFATE TAB 325 MG (65 MG ELEMENTAL FE) 325 MG: 325 (65 FE) TAB at 08:50

## 2025-06-26 RX ADMIN — INSULIN LISPRO 1 UNITS: 100 INJECTION, SOLUTION INTRAVENOUS; SUBCUTANEOUS at 16:27

## 2025-06-26 RX ADMIN — MIDODRINE HYDROCHLORIDE 20 MG: 5 TABLET ORAL at 16:27

## 2025-06-26 RX ADMIN — APIXABAN 5 MG: 5 TABLET, FILM COATED ORAL at 21:16

## 2025-06-26 RX ADMIN — Medication 2000 UNITS: at 08:43

## 2025-06-26 NOTE — PROGRESS NOTES
Department of Internal Medicine  Nephrology Attending Consult Note    Events reviewed.    SUBJECTIVE: We are following Wander Rocha for RADHA. Patient reports no complaints.    PHYSICAL EXAM:      Vitals:    VITALS:  BP (!) 123/58   Pulse 66   Temp 97 °F (36.1 °C) (Temporal)   Resp 18   Ht 1.702 m (5' 7\")   Wt 89.5 kg (197 lb 4.8 oz)   SpO2 98%   BMI 30.90 kg/m²   24HR INTAKE/OUTPUT:    Intake/Output Summary (Last 24 hours) at 6/26/2025 0902  Last data filed at 6/26/2025 0836  Gross per 24 hour   Intake 1004 ml   Output 2300 ml   Net -1296 ml     Scheduled Meds:   epoetin lay-epbx  10,000 Units SubCUTAneous Weekly    sodium chloride flush  5-40 mL IntraVENous 2 times per day    insulin lispro  0-4 Units SubCUTAneous Q4H    apixaban  5 mg Oral BID    atorvastatin  40 mg Oral Daily    [Held by provider] bumetanide  2 mg Oral BID    ferrous sulfate  325 mg Oral Every Other Day    levothyroxine  150 mcg Oral Daily    [Held by provider] metOLazone  5 mg Oral Once per day on Monday Wednesday Friday    metoprolol succinate  12.5 mg Oral Daily    midodrine  20 mg Oral TID WC    sertraline  50 mg Oral Daily    tamsulosin  0.4 mg Oral Daily    vitamin D  2,000 Units Oral Daily     Continuous Infusions:   bumetanide (BUMEX) 12.5 mg in sodium chloride 0.9 % 125 mL infusion 0.2 mg/hr (06/26/25 0933)    sodium chloride       PRN Meds:.sodium chloride, sodium chloride flush, sodium chloride, polyethylene glycol, acetaminophen **OR** acetaminophen, promethazine     Constitutional:  Awake, alert, oriented, in NAD  HEENT:  PERRLA, normocephalic, atraumatic  Respiratory:  diminished  Cardiovascular/Edema:  RRR, normal S1, normal S2 ++ edema   Gastrointestinal:  Soft, flat, non-distended, non-tender  Neurologic:  Nonfocal  Skin:  warm, dry, no rashes, no lesions    DATA:    CBC:   Lab Results   Component Value Date/Time    WBC 5.4 06/26/2025 05:46 AM    RBC 2.58 06/26/2025 05:46 AM    HGB 7.6 06/26/2025 05:46 AM    HCT 24.6

## 2025-06-26 NOTE — PLAN OF CARE
Problem: ABCDS Injury Assessment  Goal: Absence of physical injury  6/25/2025 2324 by Neyda Sanchez RN  Outcome: Progressing  6/25/2025 1812 by Khalida Rosario RN  Outcome: Progressing  6/25/2025 1047 by Yari Perkins RN  Outcome: Progressing  6/25/2025 1046 by Yari Perkins, RN  Outcome: Progressing  Flowsheets (Taken 6/25/2025 1042)  Absence of Physical Injury: Implement safety measures based on patient assessment     Problem: Skin/Tissue Integrity  Goal: Skin integrity remains intact  Description: 1.  Monitor for areas of redness and/or skin breakdown  2.  Assess vascular access sites hourly  3.  Every 4-6 hours minimum:  Change oxygen saturation probe site  4.  Every 4-6 hours:  If on nasal continuous positive airway pressure, respiratory therapy assess nares and determine need for appliance change or resting period  6/25/2025 2324 by Neyda Sanchez RN  Outcome: Progressing  6/25/2025 1812 by Khalida Rosario RN  Outcome: Progressing  Flowsheets  Taken 6/25/2025 1811  Skin Integrity Remains Intact: Monitor for areas of redness and/or skin breakdown  Taken 6/25/2025 1230  Skin Integrity Remains Intact: Monitor for areas of redness and/or skin breakdown  6/25/2025 1047 by Yari Perkins RN  Outcome: Progressing  6/25/2025 1046 by Yari Perkins, RN  Outcome: Progressing     Problem: Safety - Adult  Goal: Free from fall injury  6/25/2025 2324 by Neyda Sanchez RN  Outcome: Progressing  6/25/2025 1812 by Khalida Rosario RN  Outcome: Progressing     Problem: Chronic Conditions and Co-morbidities  Goal: Patient's chronic conditions and co-morbidity symptoms are monitored and maintained or improved  6/25/2025 2324 by Neyda Sanchez RN  Outcome: Progressing  6/25/2025 1812 by Khalida Rosario RN  Outcome: Progressing     Problem: Discharge Planning  Goal: Discharge to home or other facility with appropriate resources  6/25/2025 2324 by Neyda Sanchez RN  Outcome: Progressing  6/25/2025 1812 by Marlene  Khalida BISHOP, RN  Outcome: Progressing

## 2025-06-26 NOTE — CARE COORDINATION
6/26/26 CM Note.  Pt admitted 6/24/25 RADHA. On Bumex gtt. Pt from home with wife who assists with ADLs PCP Dr Crespo, [place] is preferred pharmacy. Home O2 4L/NC (does not know provider) Other  DME includes wc, walker and cane. PT/wife declining SNF but agreeable to [facility], referral sent via [facility] Discharge plan to return home with [facility] with wife providing transportation.   Yomaira CABELLO RN-BC  [phone]          Case Management Assessment  Initial Evaluation    Date/Time of Evaluation: 6/26/2025 3:16 PM  Assessment Completed by: Yomaira Thapa RN    If patient is discharged prior to next notation, then this note serves as note for discharge by case management.    Patient Name: Wander Rocha                   YOB: 1955  Diagnosis: RADHA (acute kidney injury) [N17.9]  Acute kidney injury [N17.9]                   Date / Time: 6/25/2025  1:34 AM    Patient Admission Status: Inpatient   Readmission Risk (Low < 19, Mod (19-27), High > 27): Readmission Risk Score: 24.6    Current PCP: Oly Crespo MD  PCP verified by CM? Yes    Chart Reviewed: Yes      History Provided by: Patient, Spouse, Medical Record  Patient Orientation: Alert and Oriented    Patient Cognition: Alert    Hospitalization in the last 30 days (Readmission):  Yes    If yes, Readmission Assessment in  Navigator will be completed.    Advance Directives:      Code Status: Full Code   Patient's Primary Decision Maker is: Legal Next of Kin    Primary Decision Maker: Petra Rocha - Spouse - [phone]    Secondary Decision Maker: Rubina Kraus - Child - [phone]    Discharge Planning:    Patient lives with: Spouse/Significant Other Type of Home: House  Primary Care Giver: Self  Patient Support Systems include: Spouse/Significant Other   Current Financial resources:    Current community resources:    Current services prior to admission: C-pap, Oxygen Therapy            Current DME:              Type of Home Care  services:  None    ADLS  Prior functional level: Assistance with the following:, Shopping, Mobility, Bathing, Housework, Cooking  Current functional level: Housework, Cooking, Shopping, Mobility, Assistance with the following:, Dressing, Bathing    PT AM-PAC: 15 /24  OT AM-PAC:   /24    Family can provide assistance at DC: Yes  Would you like Case Management to discuss the discharge plan with any other family members/significant others, and if so, who? No (wife at bedside)  Plans to Return to Present Housing: Unknown at present    Financial    Payor: [employer] MEDICARE / Plan: [employer] GOLD PLUS HMO / Product Type: *No Product type* /     Does insurance require precert for SNF: Yes      [facility] - [facility] [address] - P [phone] - F [phone]  [address]  [facility] [address]  Phone: [phone] Fax: [phone]        The Plan for Transition of Care is related to the following treatment goals of RADHA (acute kidney injury) [N17.9]  Acute kidney injury [N17.9]    IF APPLICABLE: The Patient and/or patient representative Wander and his family were provided with a choice of provider and agrees with the discharge plan. Freedom of choice list with basic dialogue that supports the patient's individualized plan of care/goals and shares the quality data associated with the providers was provided to:     Patient Representative Name:       The Patient and/or Patient Representative Agree with the Discharge Plan? yes     Yomaira Thapa RN  Case Management Department

## 2025-06-26 NOTE — PROGRESS NOTES
Physical Therapy  Initial Assessment     Name: Wander Rocha  : 1955  MRN: [id number]      Date of Service: 2025    Evaluating PT: Noe Tubbs, PT, DPT [id number]      Room #:  7408/7408-A  Diagnosis:  RADHA (acute kidney injury) [N17.9]  Acute kidney injury [N17.9]  PMHx/PSHx:   has a past medical history of RADHA (acute kidney injury), Atrial fibrillation (HCC), Carpal tunnel syndrome, Colorectal polyps, Diverticula of colon, Encounter regarding vascular access for dialysis for ESRD (HCC), Hyperlipidemia, Hypertension, Hypothyroidism, Lung nodule, Lung nodules, Nocturnal hypoxemia due to obesity, Obesity, INKO (obstructive sleep apnea), Osteoarthritis, Pacemaker, Pacemaker, Pinched nerve, Restrictive lung disease secondary to obesity, S/P laminectomy with spinal fusion, Sinus node dysfunction (HCC), Skin lesion of face, and Type II or unspecified type diabetes mellitus without mention of complication, not stated as uncontrolled.  Procedure/Surgery:  Paracentesis 25  Precautions:  Fall risk, O2, contact isolation   Equipment Needs:  TBD    SUBJECTIVE:    Pt lives in a 2 story house with 3 stair(s) and 2 rail(s) to enter. Bed and half bath is on level floor. Pt ambulated with WW prior to admission.    OBJECTIVE:   Initial Evaluation  Date: 25 Treatment Date: Short Term/ Long Term   Goals   AM-PAC 6 Clicks 15/24     Was pt agreeable to Eval/treatment? Yes     Does pt have pain? No pain reported     Bed Mobility  Rolling: Michael  Supine to sit: Michael  Sit to supine: Michael  Scooting: Michael  Rolling: Independent  Supine to sit: Independent  Sit to supine: Independent  Scooting: Independent   Transfers Sit to stand: Michael from bedside  ModA from lower surfaces  Stand to sit: Michael from bedside  ModA from lower surfaces  Stand pivot: Michael with WW  Sit to stand: Independent  Stand to sit: Independent  Stand pivot: Modified Independent with WW   Ambulation   50 feet with Michael with WW  >300 feet with Modified

## 2025-06-26 NOTE — PLAN OF CARE
Problem: ABCDS Injury Assessment  Goal: Absence of physical injury  6/26/2025 0940 by Lindy Gooden RN  Outcome: Progressing     Problem: Skin/Tissue Integrity  Goal: Skin integrity remains intact  Description: 1.  Monitor for areas of redness and/or skin breakdown  2.  Assess vascular access sites hourly  3.  Every 4-6 hours minimum:  Change oxygen saturation probe site  4.  Every 4-6 hours:  If on nasal continuous positive airway pressure, respiratory therapy assess nares and determine need for appliance change or resting period  6/26/2025 0940 by Lindy Gooden, RN  Outcome: Progressing     Problem: Safety - Adult  Goal: Free from fall injury  6/26/2025 0940 by Lindy Gooden RN  Outcome: Progressing     Problem: Chronic Conditions and Co-morbidities  Goal: Patient's chronic conditions and co-morbidity symptoms are monitored and maintained or improved  6/26/2025 0940 by Lindy Gooden, RN  Outcome: Progressing     Problem: Discharge Planning  Goal: Discharge to home or other facility with appropriate resources  6/26/2025 0940 by Lindy Gooden, RN  Outcome: Progressing

## 2025-06-26 NOTE — PROGRESS NOTES
training/DME recommendations for increased independence, safety, and fall prevention  * Patient/Family education to increase follow through with safety techniques and functional independence  * Recommendation of environmental modifications for increased safety with functional transfers/mobility and ADLs  * Therapeutic exercise to improve motor endurance, ROM, and functional strength for ADLs/functional transfers  * Therapeutic activities to facilitate/challenge dynamic balance, stand tolerance for increased safety and independence with ADLs  * Positioning to improve skin integrity, interaction with environment and functional independence  * Delirium prevention/treatment      Recommended Adaptive Equipment:  TBD     Home Living: Pt lives with wife in a 2 story home with 3 steps to enter and B handrails.Pt stays on 1st floor and goes up to use tub/shower occasionally.There is a 1/2 bath on 1st floor   Bathroom setup: tub/shower    Equipment owned: ww, home O2, w/c     Prior Level of Function: pt reports needing some assist  with ADLs  at times , needed assist  with IADLs; ambulated with ww   Driving: yes   Occupation: retired     Pain Level: B feet while walking  Cognition: A&O: 3/4; Follows multi  step directions   Memory:  fair +   Sequencing:  fair +   Problem solving:  fair +   Judgement/safety:  fair +      Functional Assessment:  AM-PAC Daily Activity Raw Score: 16/24   Initial Eval Status  Date: 6/26/25 Treatment Status  Date: STGs = LTGs  Time frame: 10-14 days   Feeding Independent      Grooming Minimal Assist   Independent    UB Dressing Minimal Assist -assist to don gown due to limited shld AROM   Setup    LB Dressing Moderate Assist -assist to don slipper socks due to edema in feet   Supervision    Bathing Moderate Assist  Minimal Assist    Toileting Maximal Assist-external pure wick catheter    Supervision    Bed Mobility  Supine to sit: Minimal Assist   Sit to supine: Minimal Assist   Supine to sit:  Independent   Sit to supine: Independent    Functional Transfers Sit to stand: Min A/Mod A-depending on height of seat    Stand Pivot:  Mod A with ww on/off bedside chair     Modified Taylor with ww    Functional Mobility Minimal Assist -throughout pt's room with ww   Modified Taylor with ww    Balance Sitting:     Static:  SBA     Dynamic:Min A   Standing: Min A      Activity Tolerance Fair+       Visual/  Perceptual Glasses:none                  Hand Dominance    AROM (PROM) Strength Additional Info:    RUE  Limited shld AROM from arthritis   Elbow,wrist and hand WFL  Proximal 2+/5  Distal 3+/5 good  and wfl FMC/dexterity noted during ADL tasks       LUE Limited shld AROM from arthritis   Elbow,wrist and hand WFL  Proximal 2+/5  Distal 3+/5 good  and wfl FMC/dexterity noted during ADL tasks       Hearing: WFL   Sensation:  No c/o numbness or tingling   Tone: WFL   Edema: BLE's     Comments: Upon arrival patient was supine in bed and agreeable to OT eval .   At end of session, patient was assisted into bedside chair  with call light and phone within reach, all lines and tubes intact.  Overall patient demonstrated  decreased independence and safety during completion of ADL/functional transfer/mobility tasks.  Pt would benefit from continued skilled OT to increase safety and independence with completion of ADL/IADL tasks for functional independence and quality of life.    Treatment: OT treatment provided this date includes:   Instruction/training on safety and adapted techniques for completion of ADLs: discussed AE for LB dressing    Instruction/training on safe functional mobility/transfer techniques: pt ed on hand placement during sit to stand transfers wotj ww    Instruction/training on energy conservation/work simplification for completion of ADLs:.  Pt ed on deep breathing techniques  to maintain SPO2 level during ADL's        Rehab Potential: Good  for established goals     Patient / Family

## 2025-06-26 NOTE — PROGRESS NOTES
[facility]  Internal Medicine Residency Program  Progress Note - House Team 1    Patient:  Wander Rocha 69 y.o. male MRN: [id number]     Date of Service: 6/26/2025     CC: hypoxia    Days since admission: 0    Subjective     Overnight events: No acute overnight events    He was seen and examined at bedside this morning. He is awake, alert, and oriented x 3. He said that his shortness of breath has improved. He remains on 4L/min via nasal cannula. He is able to eat, drink, urinate. He did not have a bowel movement overnight.     Objective     VS: BP (!) 107/55   Pulse 66   Temp 96.9 °F (36.1 °C) (Temporal)   Resp 18   Ht 1.702 m (5' 7\")   Wt 89.5 kg (197 lb 4.8 oz)   SpO2 100%   BMI 30.90 kg/m²         I & O - 24hr:   Intake/Output Summary (Last 24 hours) at 6/26/2025 1445  Last data filed at 6/26/2025 1200  Gross per 24 hour   Intake 864 ml   Output 1350 ml   Net -486 ml     Net IO Since Admission: -876 mL [06/26/25 1445]    Physical Exam:  General Appearance: in mild distress, conversant  HEENT: Normocephalic, atraumatic (+) JVD  Neck: midline trachea  Lung: crackles bilaterally  Heart: regular rate and rhythm, no murmur  Abdomen: distended, tympanitic  Extremities: 2+ bipedal edema  Musculokeletal: No joint swelling  Neurologic: Mental status: awake, alert, and oriented    Interventions     Procedures: None    Airway: Nasal cannula at 4 L/min    Medications     Continuous Infusions:   bumetanide (BUMEX) 12.5 mg in sodium chloride 0.9 % 125 mL infusion 0.2 mg/hr (06/26/25 0933)    sodium chloride       Scheduled Meds:   epoetin lay-epbx  10,000 Units SubCUTAneous Weekly    sodium chloride flush  5-40 mL IntraVENous 2 times per day    insulin lispro  0-4 Units SubCUTAneous Q4H    apixaban  5 mg Oral BID    atorvastatin  40 mg Oral Daily    [Held by provider] bumetanide  2 mg Oral BID    ferrous sulfate  325 mg Oral Every Other Day    levothyroxine  150 mcg Oral Daily    [Held by

## 2025-06-27 LAB
ANION GAP SERPL CALCULATED.3IONS-SCNC: 13 MMOL/L (ref 7–16)
ANION GAP SERPL CALCULATED.3IONS-SCNC: 15 MMOL/L (ref 7–16)
BASOPHILS # BLD: 0.05 K/UL (ref 0–0.2)
BASOPHILS NFR BLD: 1 % (ref 0–2)
BUN SERPL-MCNC: 63 MG/DL (ref 8–23)
BUN SERPL-MCNC: 64 MG/DL (ref 8–23)
CALCIUM SERPL-MCNC: 9.6 MG/DL (ref 8.8–10.2)
CALCIUM SERPL-MCNC: 9.6 MG/DL (ref 8.8–10.2)
CHLORIDE SERPL-SCNC: 93 MMOL/L (ref 98–107)
CHLORIDE SERPL-SCNC: 96 MMOL/L (ref 98–107)
CO2 SERPL-SCNC: 27 MMOL/L (ref 22–29)
CO2 SERPL-SCNC: 27 MMOL/L (ref 22–29)
CREAT SERPL-MCNC: 1.8 MG/DL (ref 0.7–1.2)
CREAT SERPL-MCNC: 2 MG/DL (ref 0.7–1.2)
EOSINOPHIL # BLD: 0.48 K/UL (ref 0.05–0.5)
EOSINOPHILS RELATIVE PERCENT: 7 % (ref 0–6)
ERYTHROCYTE [DISTWIDTH] IN BLOOD BY AUTOMATED COUNT: 18.7 % (ref 11.5–15)
GFR, ESTIMATED: 36 ML/MIN/1.73M2
GFR, ESTIMATED: 39 ML/MIN/1.73M2
GLUCOSE BLD-MCNC: 128 MG/DL (ref 74–99)
GLUCOSE BLD-MCNC: 162 MG/DL (ref 74–99)
GLUCOSE BLD-MCNC: 174 MG/DL (ref 74–99)
GLUCOSE BLD-MCNC: 193 MG/DL (ref 74–99)
GLUCOSE BLD-MCNC: 211 MG/DL (ref 74–99)
GLUCOSE SERPL-MCNC: 121 MG/DL (ref 74–99)
GLUCOSE SERPL-MCNC: 205 MG/DL (ref 74–99)
HCT VFR BLD AUTO: 29 % (ref 37–54)
HGB BLD-MCNC: 8.8 G/DL (ref 12.5–16.5)
IMM GRANULOCYTES # BLD AUTO: 0.07 K/UL (ref 0–0.58)
IMM GRANULOCYTES NFR BLD: 1 % (ref 0–5)
LYMPHOCYTES NFR BLD: 0.96 K/UL (ref 1.5–4)
LYMPHOCYTES RELATIVE PERCENT: 15 % (ref 20–42)
MAGNESIUM SERPL-MCNC: 2.1 MG/DL (ref 1.6–2.4)
MCH RBC QN AUTO: 29.6 PG (ref 26–35)
MCHC RBC AUTO-ENTMCNC: 30.3 G/DL (ref 32–34.5)
MCV RBC AUTO: 97.6 FL (ref 80–99.9)
MONOCYTES NFR BLD: 0.66 K/UL (ref 0.1–0.95)
MONOCYTES NFR BLD: 10 % (ref 2–12)
NEUTROPHILS NFR BLD: 66 % (ref 43–80)
NEUTS SEG NFR BLD: 4.24 K/UL (ref 1.8–7.3)
PLATELET # BLD AUTO: 145 K/UL (ref 130–450)
PMV BLD AUTO: 9.7 FL (ref 7–12)
POTASSIUM SERPL-SCNC: 4 MMOL/L (ref 3.5–5.1)
POTASSIUM SERPL-SCNC: 4 MMOL/L (ref 3.5–5.1)
RBC # BLD AUTO: 2.97 M/UL (ref 3.8–5.8)
SODIUM SERPL-SCNC: 135 MMOL/L (ref 136–145)
SODIUM SERPL-SCNC: 136 MMOL/L (ref 136–145)
WBC OTHER # BLD: 6.5 K/UL (ref 4.5–11.5)

## 2025-06-27 PROCEDURE — 2500000003 HC RX 250 WO HCPCS

## 2025-06-27 PROCEDURE — 6370000000 HC RX 637 (ALT 250 FOR IP)

## 2025-06-27 PROCEDURE — 82962 GLUCOSE BLOOD TEST: CPT

## 2025-06-27 PROCEDURE — 83735 ASSAY OF MAGNESIUM: CPT

## 2025-06-27 PROCEDURE — 99232 SBSQ HOSP IP/OBS MODERATE 35: CPT | Performed by: INTERNAL MEDICINE

## 2025-06-27 PROCEDURE — 36415 COLL VENOUS BLD VENIPUNCTURE: CPT

## 2025-06-27 PROCEDURE — 2060000000 HC ICU INTERMEDIATE R&B

## 2025-06-27 PROCEDURE — 36556 INSERT NON-TUNNEL CV CATH: CPT

## 2025-06-27 PROCEDURE — 85025 COMPLETE CBC W/AUTO DIFF WBC: CPT

## 2025-06-27 PROCEDURE — 80048 BASIC METABOLIC PNL TOTAL CA: CPT

## 2025-06-27 RX ORDER — METOLAZONE 5 MG/1
5 TABLET ORAL DAILY
Status: DISCONTINUED | OUTPATIENT
Start: 2025-06-28 | End: 2025-06-28 | Stop reason: HOSPADM

## 2025-06-27 RX ADMIN — ACETAMINOPHEN 650 MG: 325 TABLET ORAL at 02:04

## 2025-06-27 RX ADMIN — INSULIN LISPRO 1 UNITS: 100 INJECTION, SOLUTION INTRAVENOUS; SUBCUTANEOUS at 12:03

## 2025-06-27 RX ADMIN — MIDODRINE HYDROCHLORIDE 20 MG: 5 TABLET ORAL at 16:59

## 2025-06-27 RX ADMIN — BUMETANIDE 2 MG: 1 TABLET ORAL at 16:59

## 2025-06-27 RX ADMIN — TAMSULOSIN HYDROCHLORIDE 0.4 MG: 0.4 CAPSULE ORAL at 08:44

## 2025-06-27 RX ADMIN — METOPROLOL SUCCINATE 12.5 MG: 25 TABLET, EXTENDED RELEASE ORAL at 08:43

## 2025-06-27 RX ADMIN — LEVOTHYROXINE SODIUM 150 MCG: 0.1 TABLET ORAL at 05:20

## 2025-06-27 RX ADMIN — SERTRALINE 50 MG: 50 TABLET, FILM COATED ORAL at 08:44

## 2025-06-27 RX ADMIN — SODIUM CHLORIDE, PRESERVATIVE FREE 10 ML: 5 INJECTION INTRAVENOUS at 20:39

## 2025-06-27 RX ADMIN — MIDODRINE HYDROCHLORIDE 20 MG: 5 TABLET ORAL at 12:04

## 2025-06-27 RX ADMIN — Medication 2000 UNITS: at 08:44

## 2025-06-27 RX ADMIN — MIDODRINE HYDROCHLORIDE 20 MG: 5 TABLET ORAL at 08:44

## 2025-06-27 RX ADMIN — APIXABAN 5 MG: 5 TABLET, FILM COATED ORAL at 08:44

## 2025-06-27 RX ADMIN — APIXABAN 5 MG: 5 TABLET, FILM COATED ORAL at 20:39

## 2025-06-27 RX ADMIN — INSULIN LISPRO 1 UNITS: 100 INJECTION, SOLUTION INTRAVENOUS; SUBCUTANEOUS at 20:42

## 2025-06-27 RX ADMIN — ATORVASTATIN CALCIUM 40 MG: 40 TABLET, FILM COATED ORAL at 08:44

## 2025-06-27 RX ADMIN — SODIUM CHLORIDE, PRESERVATIVE FREE 10 ML: 5 INJECTION INTRAVENOUS at 08:48

## 2025-06-27 ASSESSMENT — PAIN SCALES - GENERAL
PAINLEVEL_OUTOF10: 9
PAINLEVEL_OUTOF10: 8

## 2025-06-27 ASSESSMENT — PAIN DESCRIPTION - LOCATION: LOCATION: BACK

## 2025-06-27 ASSESSMENT — PAIN DESCRIPTION - ORIENTATION: ORIENTATION: LOWER

## 2025-06-27 NOTE — PLAN OF CARE
Problem: ABCDS Injury Assessment  Goal: Absence of physical injury  Outcome: Progressing     Problem: Skin/Tissue Integrity  Goal: Skin integrity remains intact  Description: 1.  Monitor for areas of redness and/or skin breakdown  2.  Assess vascular access sites hourly  3.  Every 4-6 hours minimum:  Change oxygen saturation probe site  4.  Every 4-6 hours:  If on nasal continuous positive airway pressure, respiratory therapy assess nares and determine need for appliance change or resting period  Outcome: Progressing     Problem: Safety - Adult  Goal: Free from fall injury  Outcome: Progressing     Problem: Chronic Conditions and Co-morbidities  Goal: Patient's chronic conditions and co-morbidity symptoms are monitored and maintained or improved  Outcome: Progressing     Problem: Discharge Planning  Goal: Discharge to home or other facility with appropriate resources  Outcome: Progressing

## 2025-06-27 NOTE — PROGRESS NOTES
Department of Internal Medicine  Nephrology Attending Consult Note    Events reviewed.    SUBJECTIVE: We are following Wander Rocha for RADHA. Patient reports no complaints.    PHYSICAL EXAM:      Vitals:    VITALS:  /65   Pulse 68   Temp 96.9 °F (36.1 °C) (Temporal)   Resp 17   Ht 1.702 m (5' 7\")   Wt 89.9 kg (198 lb 3.2 oz)   SpO2 91%   BMI 31.04 kg/m²   24HR INTAKE/OUTPUT:    Intake/Output Summary (Last 24 hours) at 6/27/2025 0914  Last data filed at 6/27/2025 0458  Gross per 24 hour   Intake 440 ml   Output 1650 ml   Net -1210 ml     Scheduled Meds:   epoetin lay-epbx  10,000 Units SubCUTAneous Weekly    sodium chloride flush  5-40 mL IntraVENous 2 times per day    insulin lispro  0-4 Units SubCUTAneous Q4H    apixaban  5 mg Oral BID    atorvastatin  40 mg Oral Daily    [Held by provider] bumetanide  2 mg Oral BID    ferrous sulfate  325 mg Oral Every Other Day    levothyroxine  150 mcg Oral Daily    [Held by provider] metOLazone  5 mg Oral Once per day on Monday Wednesday Friday    metoprolol succinate  12.5 mg Oral Daily    midodrine  20 mg Oral TID WC    sertraline  50 mg Oral Daily    tamsulosin  0.4 mg Oral Daily    vitamin D  2,000 Units Oral Daily     Continuous Infusions:   bumetanide (BUMEX) 12.5 mg in sodium chloride 0.9 % 125 mL infusion 0.2 mg/hr (06/27/25 0653)    sodium chloride       PRN Meds:.sodium chloride, sodium chloride flush, sodium chloride, polyethylene glycol, acetaminophen **OR** acetaminophen, promethazine     Constitutional:  Awake, alert, oriented, in NAD  HEENT:  PERRLA, normocephalic, atraumatic  Respiratory:  diminished  Cardiovascular/Edema:  RRR, normal S1, normal S2 ++ edema   Gastrointestinal:  Soft, flat, non-distended, non-tender  Neurologic:  Nonfocal  Skin:  warm, dry, no rashes, no lesions    DATA:    CBC:   Lab Results   Component Value Date/Time    WBC 6.5 06/27/2025 06:45 AM    RBC 2.97 06/27/2025 06:45 AM    HGB 8.8 06/27/2025 06:45 AM    HCT 29.0

## 2025-06-27 NOTE — PROGRESS NOTES
[facility]  Internal Medicine Residency Program  Progress Note - House Team       Patient:  Wander Rocha 69 y.o. male   MRN: [id number]       Date of Service: 6/27/2025    CC: Abnormal Lab (Patient sent from CHF clinic for abnormal labs / decrease kidney function)    Overnight events: none     Subjective     Patient was seen and examined at bedside. He was sitting in a chair comfortably. He is feeling well and much better. He denied chest pain, SOB at rest, nausea. He is still having exertional dyspnea. He states that his LE edema improved significantly. He is eating well, having adequate urine output, had 1 bowel movement yesterday.    Objective     I & O - 24hr:    Intake/Output Summary (Last 24 hours) at 6/27/2025 1358  Last data filed at 6/27/2025 0845  Gross per 24 hour   Intake 620 ml   Output 1650 ml   Net -1030 ml     Net IO Since Admission: -1,906 mL [06/27/25 1358]    Physical Exam  Vitals: BP (!) 98/57   Pulse 60   Temp (!) 96.7 °F (35.9 °C) (Tympanic)   Resp 20   Ht 1.702 m (5' 7\")   Wt 89.9 kg (198 lb 3.2 oz)   SpO2 93%   BMI 31.04 kg/m²     General Appearance: alert, appears stated age, cooperative, no distress, and mildly obese  HEENT:  Head: Normal, normocephalic, atraumatic.  Lung: bibasilar crackles  Heart: regular rate and rhythm and S1, S2 normal.  Abdomen: soft, nontender, normal BS  Extremities:  edema 1-2+ up to knees  Neurologic: Alert, oriented, thought content appropriate    Diet:   ADULT DIET; Regular; 5 carb choices (75 gm/meal); Low Sodium (2 gm); 1500 ml      Pertinent Labs & Imaging Studies     Labs    Recent Labs     06/25/25  0804 06/26/25  0546 06/27/25  0645   WBC 6.2 5.4 6.5   HGB 8.4* 7.6* 8.8*   HCT 25.9* 24.6* 29.0*   MCV 95.6 95.3 97.6   * 123* 145     Recent Labs     06/25/25  0804 06/25/25  1741 06/26/25  0546 06/26/25  1742 06/27/25  0645      < > 137 134* 136   K 4.1   < > 4.0 4.0 4.0   CL 99   < > 98 94* 96*   CO2 26   < > 26 27

## 2025-06-27 NOTE — NURSE NAVIGATOR
Patient's chart updated to reflect:      .    - HF care plan, HF education points and HF discharge instructions.  -Orders: 2 gram sodium diet, daily weights, I/O.  -PCP or cardiology follow up appointments to be scheduled within 7 days of hospital discharge.  -CHF education session will be provided to the patient prior to hospital discharge.     Poppy Vickers RN, CHFN   Heart Failure Navigator

## 2025-06-27 NOTE — PLAN OF CARE
Problem: ABCDS Injury Assessment  Goal: Absence of physical injury  6/26/2025 2153 by Snatana Ness RN  Outcome: Progressing  6/26/2025 0940 by Lindy Gooden RN  Outcome: Progressing     Problem: Skin/Tissue Integrity  Goal: Skin integrity remains intact  Description: 1.  Monitor for areas of redness and/or skin breakdown  2.  Assess vascular access sites hourly  3.  Every 4-6 hours minimum:  Change oxygen saturation probe site  4.  Every 4-6 hours:  If on nasal continuous positive airway pressure, respiratory therapy assess nares and determine need for appliance change or resting period  6/26/2025 2153 by Santana Ness, RN  Outcome: Progressing  6/26/2025 0940 by Lindy Gooden RN  Outcome: Progressing     Problem: Safety - Adult  Goal: Free from fall injury  6/26/2025 2153 by Santana Ness, RN  Outcome: Progressing  6/26/2025 0940 by Lindy Gooden RN  Outcome: Progressing     Problem: Chronic Conditions and Co-morbidities  Goal: Patient's chronic conditions and co-morbidity symptoms are monitored and maintained or improved  6/26/2025 0940 by Lindy Gooden RN  Outcome: Progressing     Problem: Discharge Planning  Goal: Discharge to home or other facility with appropriate resources  6/26/2025 0940 by Lindy Gooden RN  Outcome: Progressing

## 2025-06-27 NOTE — CARE COORDINATION
6/27/25 Update CM Note. Pt admitted 6/24/25 RADHA. Remains on Bumex gtt. O2 3L (Baseline 3-4L) Discharge plan to return home with [facility] for PT, orders obtained.  Wife will be providing transportation.   Yomaira FLORESN RN-BC  [phone]

## 2025-06-27 NOTE — PROGRESS NOTES
Pt refusing NIV for the night. Stated that they didn't want to try it and that they wanted to just wear NC overnight.

## 2025-06-27 NOTE — PROGRESS NOTES
[facility]  Internal Medicine Residency Program  Progress Note - House Team 1    Patient:  Wander Rocha 69 y.o. male MRN: [id number]     Date of Service: 6/26/2025     CC: ***  Overnight events: ***     Subjective     ***    Objective     Physical Exam:  Vitals: /85   Pulse 68   Temp 97 °F (36.1 °C) (Temporal)   Resp 18   Ht 1.702 m (5' 7\")   Wt 89.5 kg (197 lb 4.8 oz)   SpO2 95%   BMI 30.90 kg/m²     I & O - 24hr: No intake/output data recorded.   General Appearance: {Exam; general:43740::\"alert\",\"appears stated age\",\"cooperative\"}  HEENT:  {Exam; heent:24091}  Neck: {neck exam:13260::\"no adenopathy\",\"no carotid bruit\",\"no JVD\",\"supple, symmetrical, trachea midline\",\"thyroid not enlarged, symmetric, no tenderness/mass/nodules\"}  Lung: {lung exam:60882::\"clear to auscultation bilaterally\"}  Heart: {heart exam:5510::\"regular rate and rhythm, S1, S2 normal, no murmur, click, rub or gallop\"}  Abdomen: {abdominal exam:33874::\"soft, non-tender; bowel sounds normal; no masses,  no organomegaly\"}  Extremities:  {extremity exam:5109::\"extremities normal, atraumatic, no cyanosis or edema\"}  Musculokeletal: No joint swelling, no muscle tenderness. ROM normal in all joints of extremities.   Neurologic: Mental status: {:56787}  Subject  Pertinent Labs & Imaging Studies   carmela  {LABS:130086263}    Resident's Assessment and Plan     Wander Rocha is a 69 y.o. male    Acute systolic heart failure  Acute hypoxic respiratory insufficiency likely secondary to pulmonary edema  Ascites  Right pleural effusion  RADHA stage II on CKD, baseline creatinine 1.7  Urinary retention  HFmrEF improved to HFpEF  Persistent atrial fibrillation, [id number] Score 4  Sinus node dysfunction s/p dual-chamber pacemaker placement and CRT-P (4/23/2025)  History of nonsustained ventricular tachycardia  Prolonged QTc  Hypertension  Hyperlipidemia  NIKO, on CPAP  CKD stage 3B likely secondary to nephrosclerosis and history of

## 2025-06-28 VITALS
HEIGHT: 67 IN | HEART RATE: 59 BPM | BODY MASS INDEX: 31.11 KG/M2 | SYSTOLIC BLOOD PRESSURE: 106 MMHG | TEMPERATURE: 97.5 F | RESPIRATION RATE: 21 BRPM | DIASTOLIC BLOOD PRESSURE: 61 MMHG | OXYGEN SATURATION: 95 % | WEIGHT: 198.2 LBS

## 2025-06-28 LAB
ANION GAP SERPL CALCULATED.3IONS-SCNC: 12 MMOL/L (ref 7–16)
BASOPHILS # BLD: 0.03 K/UL (ref 0–0.2)
BASOPHILS NFR BLD: 1 % (ref 0–2)
BUN SERPL-MCNC: 59 MG/DL (ref 8–23)
CALCIUM SERPL-MCNC: 9.9 MG/DL (ref 8.8–10.2)
CHLORIDE SERPL-SCNC: 95 MMOL/L (ref 98–107)
CO2 SERPL-SCNC: 28 MMOL/L (ref 22–29)
CREAT SERPL-MCNC: 1.6 MG/DL (ref 0.7–1.2)
EOSINOPHIL # BLD: 0.34 K/UL (ref 0.05–0.5)
EOSINOPHILS RELATIVE PERCENT: 6 % (ref 0–6)
ERYTHROCYTE [DISTWIDTH] IN BLOOD BY AUTOMATED COUNT: 18.7 % (ref 11.5–15)
GFR, ESTIMATED: 44 ML/MIN/1.73M2
GLUCOSE BLD-MCNC: 155 MG/DL (ref 74–99)
GLUCOSE BLD-MCNC: 165 MG/DL (ref 74–99)
GLUCOSE BLD-MCNC: 171 MG/DL (ref 74–99)
GLUCOSE SERPL-MCNC: 142 MG/DL (ref 74–99)
HCT VFR BLD AUTO: 27.6 % (ref 37–54)
HGB BLD-MCNC: 8.4 G/DL (ref 12.5–16.5)
IMM GRANULOCYTES # BLD AUTO: 0.07 K/UL (ref 0–0.58)
IMM GRANULOCYTES NFR BLD: 1 % (ref 0–5)
LYMPHOCYTES NFR BLD: 0.61 K/UL (ref 1.5–4)
LYMPHOCYTES RELATIVE PERCENT: 11 % (ref 20–42)
MAGNESIUM SERPL-MCNC: 2 MG/DL (ref 1.6–2.4)
MCH RBC QN AUTO: 29 PG (ref 26–35)
MCHC RBC AUTO-ENTMCNC: 30.4 G/DL (ref 32–34.5)
MCV RBC AUTO: 95.2 FL (ref 80–99.9)
MONOCYTES NFR BLD: 0.49 K/UL (ref 0.1–0.95)
MONOCYTES NFR BLD: 9 % (ref 2–12)
NEUTROPHILS NFR BLD: 73 % (ref 43–80)
NEUTS SEG NFR BLD: 4.11 K/UL (ref 1.8–7.3)
PLATELET # BLD AUTO: 143 K/UL (ref 130–450)
PMV BLD AUTO: 9.9 FL (ref 7–12)
POTASSIUM SERPL-SCNC: 4 MMOL/L (ref 3.5–5.1)
RBC # BLD AUTO: 2.9 M/UL (ref 3.8–5.8)
SODIUM SERPL-SCNC: 135 MMOL/L (ref 136–145)
WBC OTHER # BLD: 5.7 K/UL (ref 4.5–11.5)

## 2025-06-28 PROCEDURE — 99238 HOSP IP/OBS DSCHRG MGMT 30/<: CPT | Performed by: INTERNAL MEDICINE

## 2025-06-28 PROCEDURE — 85025 COMPLETE CBC W/AUTO DIFF WBC: CPT

## 2025-06-28 PROCEDURE — 6370000000 HC RX 637 (ALT 250 FOR IP)

## 2025-06-28 PROCEDURE — 83735 ASSAY OF MAGNESIUM: CPT

## 2025-06-28 PROCEDURE — 80048 BASIC METABOLIC PNL TOTAL CA: CPT

## 2025-06-28 PROCEDURE — 82962 GLUCOSE BLOOD TEST: CPT

## 2025-06-28 PROCEDURE — 2500000003 HC RX 250 WO HCPCS

## 2025-06-28 PROCEDURE — 36415 COLL VENOUS BLD VENIPUNCTURE: CPT

## 2025-06-28 RX ORDER — BUMETANIDE 2 MG/1
2 TABLET ORAL 2 TIMES DAILY
Qty: 30 TABLET | Refills: 3 | Status: SHIPPED | OUTPATIENT
Start: 2025-06-28

## 2025-06-28 RX ORDER — TAMSULOSIN HYDROCHLORIDE 0.4 MG/1
0.4 CAPSULE ORAL DAILY
Qty: 90 CAPSULE | Refills: 1 | Status: SHIPPED | OUTPATIENT
Start: 2025-06-28

## 2025-06-28 RX ORDER — LEVOTHYROXINE SODIUM 150 UG/1
150 TABLET ORAL DAILY
Qty: 30 TABLET | Refills: 3 | Status: SHIPPED | OUTPATIENT
Start: 2025-06-29

## 2025-06-28 RX ORDER — METOLAZONE 5 MG/1
5 TABLET ORAL DAILY
Qty: 30 TABLET | Refills: 3 | Status: SHIPPED | OUTPATIENT
Start: 2025-06-29

## 2025-06-28 RX ORDER — METOLAZONE 5 MG/1
5 TABLET ORAL DAILY
Qty: 30 TABLET | Refills: 3 | Status: CANCELLED | OUTPATIENT
Start: 2025-06-28

## 2025-06-28 RX ORDER — MIDODRINE HYDROCHLORIDE 10 MG/1
20 TABLET ORAL
Qty: 180 TABLET | Refills: 1 | Status: SHIPPED | OUTPATIENT
Start: 2025-06-28

## 2025-06-28 RX ORDER — METOPROLOL SUCCINATE 25 MG/1
12.5 TABLET, EXTENDED RELEASE ORAL DAILY
Qty: 30 TABLET | Refills: 3 | Status: SHIPPED | OUTPATIENT
Start: 2025-06-28

## 2025-06-28 RX ADMIN — MIDODRINE HYDROCHLORIDE 20 MG: 5 TABLET ORAL at 12:14

## 2025-06-28 RX ADMIN — LEVOTHYROXINE SODIUM 150 MCG: 0.1 TABLET ORAL at 05:25

## 2025-06-28 RX ADMIN — SODIUM CHLORIDE, PRESERVATIVE FREE 10 ML: 5 INJECTION INTRAVENOUS at 08:11

## 2025-06-28 RX ADMIN — SERTRALINE 50 MG: 50 TABLET, FILM COATED ORAL at 08:11

## 2025-06-28 RX ADMIN — Medication 2000 UNITS: at 08:11

## 2025-06-28 RX ADMIN — FERROUS SULFATE TAB 325 MG (65 MG ELEMENTAL FE) 325 MG: 325 (65 FE) TAB at 08:16

## 2025-06-28 RX ADMIN — TAMSULOSIN HYDROCHLORIDE 0.4 MG: 0.4 CAPSULE ORAL at 08:11

## 2025-06-28 RX ADMIN — APIXABAN 5 MG: 5 TABLET, FILM COATED ORAL at 08:11

## 2025-06-28 RX ADMIN — METOPROLOL SUCCINATE 12.5 MG: 25 TABLET, EXTENDED RELEASE ORAL at 08:11

## 2025-06-28 RX ADMIN — ATORVASTATIN CALCIUM 40 MG: 40 TABLET, FILM COATED ORAL at 08:11

## 2025-06-28 RX ADMIN — MIDODRINE HYDROCHLORIDE 20 MG: 5 TABLET ORAL at 08:10

## 2025-06-28 RX ADMIN — METOLAZONE 5 MG: 5 TABLET ORAL at 09:51

## 2025-06-28 RX ADMIN — BUMETANIDE 2 MG: 1 TABLET ORAL at 08:11

## 2025-06-28 ASSESSMENT — ENCOUNTER SYMPTOMS
ABDOMINAL PAIN: 0
NAUSEA: 0
BACK PAIN: 0
EYES NEGATIVE: 1
CHEST TIGHTNESS: 0
VOMITING: 0
CONSTIPATION: 0
SHORTNESS OF BREATH: 1
DIARRHEA: 0
COUGH: 0
GASTROINTESTINAL NEGATIVE: 1
ABDOMINAL DISTENTION: 0

## 2025-06-28 NOTE — PROGRESS NOTES
Department of Internal Medicine  Nephrology Progress Note    Events reviewed.    SUBJECTIVE: We are following Wander MENA Rocha for RADHA. Patient reports no complaints.    PHYSICAL EXAM:      Vitals:    VITALS:  /60   Pulse 65   Temp 97.7 °F (36.5 °C) (Oral)   Resp 20   Ht 1.702 m (5' 7\")   Wt 89.9 kg (198 lb 3.2 oz)   SpO2 98%   BMI 31.04 kg/m²   24HR INTAKE/OUTPUT:    Intake/Output Summary (Last 24 hours) at 6/28/2025 1000  Last data filed at 6/28/2025 0703  Gross per 24 hour   Intake 180 ml   Output 1500 ml   Net -1320 ml     Scheduled Meds:   metOLazone  5 mg Oral Daily    epoetin lay-epbx  10,000 Units SubCUTAneous Weekly    sodium chloride flush  5-40 mL IntraVENous 2 times per day    insulin lispro  0-4 Units SubCUTAneous Q4H    apixaban  5 mg Oral BID    atorvastatin  40 mg Oral Daily    bumetanide  2 mg Oral BID    ferrous sulfate  325 mg Oral Every Other Day    levothyroxine  150 mcg Oral Daily    metoprolol succinate  12.5 mg Oral Daily    midodrine  20 mg Oral TID WC    sertraline  50 mg Oral Daily    tamsulosin  0.4 mg Oral Daily    vitamin D  2,000 Units Oral Daily     Continuous Infusions:   sodium chloride       PRN Meds:.sodium chloride, sodium chloride flush, sodium chloride, polyethylene glycol, acetaminophen **OR** acetaminophen, promethazine     Constitutional:  Awake, alert, oriented, in NAD  HEENT:  PERRLA, normocephalic, atraumatic  Respiratory:  diminished  Cardiovascular/Edema:  RRR, normal S1, normal S2 ++ edema   Gastrointestinal:  Soft, flat, non-distended, non-tender  Neurologic:  Nonfocal  Skin:  warm, dry, no rashes, no lesions    DATA:    CBC:   Lab Results   Component Value Date/Time    WBC 5.7 06/28/2025 07:05 AM    RBC 2.90 06/28/2025 07:05 AM    HGB 8.4 06/28/2025 07:05 AM    HCT 27.6 06/28/2025 07:05 AM    MCV 95.2 06/28/2025 07:05 AM    MCH 29.0 06/28/2025 07:05 AM    MCHC 30.4 06/28/2025 07:05 AM    RDW 18.7 06/28/2025 07:05 AM     06/28/2025 07:05 AM    MPV  of ischemic ATN requiring dialysis,  baseline creatinine 1.7 mg/dL  HFmEF 45% improved 50-55%, proBNP 6163  Normocytic anemia, ferritin 112, iron saturation 8%, folate 8.4, B12 687, on epoetin lay  --------------------------------  Persistent atrial fibrillation, on apixaban  Pacemaker insertion 4/23/2025  NIKO  Hypothyroidism, on levothyroxine  Hyperlipidemia, on atorvastatin  Type II DM, on SSI     Plan:     Bumex 2 mg p.o. twice daily  Metolazone 5 mg p.o. daily  Continue midodrine 20 mg p.o. 3 times daily  Continue epoetin lay 10,000 units weekly  Continue to monitor renal function  Continue to monitor blood pressure  Okay to discharge from renal point of view, follow-up in 2 to 3 weeks, BMP 1 week prior      Electronically signed by JACQUELINE THOMSON NP on 6/28/2025 at 10:00 AM     I saw and evaluated the patient, performing the key elements of the service. I discussed the findings, assessment and plan with NP and agree with her findings and plans as documented in her note.     JACQUELINE THOMSON NP

## 2025-06-28 NOTE — PLAN OF CARE
Problem: ABCDS Injury Assessment  Goal: Absence of physical injury  6/28/2025 0928 by Lindy Gooden, RN  Outcome: Progressing     Problem: Skin/Tissue Integrity  Goal: Skin integrity remains intact  Description: 1.  Monitor for areas of redness and/or skin breakdown  2.  Assess vascular access sites hourly  3.  Every 4-6 hours minimum:  Change oxygen saturation probe site  4.  Every 4-6 hours:  If on nasal continuous positive airway pressure, respiratory therapy assess nares and determine need for appliance change or resting period  6/28/2025 0928 by Lindy Gooden, RN  Outcome: Progressing     Problem: Safety - Adult  Goal: Free from fall injury  6/28/2025 0928 by Lindy Gooden RN  Outcome: Progressing     Problem: Chronic Conditions and Co-morbidities  Goal: Patient's chronic conditions and co-morbidity symptoms are monitored and maintained or improved  6/28/2025 0928 by Lindy Gooden, RN  Outcome: Progressing     Problem: Discharge Planning  Goal: Discharge to home or other facility with appropriate resources  6/28/2025 0928 by Lindy Gooden, RN  Outcome: Progressing

## 2025-06-28 NOTE — DISCHARGE SUMMARY
[facility]  Discharge Summary    PCP: Oly Crespo MD    Admit Date:6/25/2025  Discharge Date:     Chief Complaint   Patient presents with    Abnormal Lab     Patient sent from CHF clinic for abnormal labs / decrease kidney function         Admission Diagnosis:   Acute on chronic hypoxic respiratory failure likely secondary to fluid overload  Acute decompensated heart failure likely secondary to medication nonadherence  Prolonged QTc, 507  Heart failure with improved ejection fraction, LVEF 50-55% (0/29/2025)  Long standing persistent atrial fibrillation, [id number] score 4  Sinus node disorder s/p dual-chamber pacemaker insertion and CRT-P (04/23/2025)  Hypertension  Hyperlipidemia  History of nonsustained ventricular tachycardia  Anasarca likely secondary to heart failure exacerbation  RADHA St I on CKD IIIb, baseline creatinine 2.0  Type 2 diabetes mellitus, A1c 7.4 (05/18/2025)  Hypothyroidism  Chronic normocytic anemia  Urinary retention  Obstructive sleep apnea, on CPAP  Obesity class I, BMI 32.11  Vitamin D deficiency    Discharge Diagnosis:  Acute on chronic hypoxic respiratory failure likely secondary to fluid overload, improved  Acute decompensated heart failure likely secondary to medication nonadherence  Prolonged QTc, 507  Heart failure with improved ejection fraction, LVEF 50-55% (0/29/2025)  Long standing persistent atrial fibrillation, [id number] score 4  Sinus node disorder s/p dual-chamber pacemaker insertion and CRT-P (04/23/2025)  Hypertension  Hyperlipidemia  History of nonsustained ventricular tachycardia  Anasarca likely secondary to heart failure exacerbation  RADHA St I on CKD IIIb, baseline creatinine 2.0, resolved  Type 2 diabetes mellitus, A1c 7.4 (05/18/2025)  Hypothyroidism  Chronic normocytic anemia  Urinary retention  Obstructive sleep apnea, on CPAP  Obesity class I, BMI 32.11  Vitamin D deficiency    Hospital Course:     Wander SAN  discharge : < 30 mins [] >30 mins [x]     Discharge Medications:  Current Discharge Medication List        CONTINUE these medications which have NOT CHANGED    Details   midodrine (PROAMATINE) 10 MG tablet Take 2 tablets by mouth 3 times daily (with meals)      levothyroxine (SYNTHROID) 150 MCG tablet Take 1 tablet by mouth Daily      vitamin D (CHOLECALCIFEROL) 50 MCG (2000 UT) TABS tablet Take 1 tablet by mouth daily      tamsulosin (FLOMAX) 0.4 MG capsule Take 1 capsule by mouth daily  Qty: 90 capsule, Refills: 1      polyethylene glycol (GLYCOLAX) 17 g packet Take 1 packet by mouth daily as needed      docusate (COLACE, DULCOLAX) 100 MG CAPS Take 100 mg by mouth 2 times daily      aspirin 81 MG EC tablet Take 1 tablet by mouth daily      apixaban (ELIQUIS) 5 MG TABS tablet Take 1 tablet by mouth 2 times daily      atorvastatin (LIPITOR) 40 MG tablet Take 1 tablet by mouth daily      metoprolol succinate (TOPROL XL) 25 MG extended release tablet Take 0.5 tablets by mouth daily      metOLazone (ZAROXOLYN) 5 MG tablet Take 1 tablet by mouth three times a week      insulin lispro (HUMALOG,ADMELOG) 100 UNIT/ML SOLN injection vial Inject 0-6 Units into the skin 4 times daily (before meals and nightly) Blood sugar 150-200: add 1 Units, 201-250 : Add 2 Units; 251 - 300: Add 3 Units; 301-350: Add 4  Units; 350-400: Add 5 Units; >400: Add 6 Units      epoetin lay (EPOGEN) 89255 UNIT/ML injection From Nephrology: Give 10,000 units of epoetin lay every 2 weeks for 10 months      clobetasol (TEMOVATE) 0.05 % cream Apply topically 2 times daily.  Qty: 60 g, Refills: 1      miconazole (MICOTIN) 2 % powder Apply topically 2 times daily.  Qty: 85 g, Refills: 1      bumetanide (BUMEX) 2 MG tablet Take 1 tablet by mouth in the morning and 1 tablet in the evening.      sertraline (ZOLOFT) 50 MG tablet Take 1 tablet by mouth daily  Qty: 90 tablet, Refills: 1    Associated Diagnoses: Major depressive disorder, recurrent, mild

## 2025-06-28 NOTE — CONSULTS
[facility]   Inpatient CHF Nurse Navigator Consult    Cardiologist: Dr. Fontanez  Primary Care Physician: Oly Crespo MD     Wander Rocha is a 70 y.o. (1955) male with a history of HFimpEF, most recent EF:  Lab Results   Component Value Date    LVEF 50 04/29/2025    LVEFMODE Echo 04/29/2025       Patient was awake and alert, laying in bed during the consultation and is agreeable to heart failure education. He was engaged and asked appropriate questions throughout the education session.     Barriers identified during consult contributing to HF Hospitalization: lack of motivation, overwhelmed by complexity of regimen, and lack of education.     [x] Limited medication adherence   [x] Poor health literacy, education regarding HF medications provided   [] Pill box provided to patient  [] Difficulty affording medications  [] Difficulty obtaining/ managing medications  [] Prescription assistance information given     [x] Not weighing themselves daily  [x] Weight log provided for easy monitoring- Patient has a working scale at home.  [] Scale provided     [x] Not following low sodium diet  [] Food insecurity   [x] 2 gram sodium diet education provided   [] Low sodium recipes provided  [] Sodium free seasoning provided   [] Low sodium meal delivery options given to patient  [] Dietician consulted     [] Lack of transportation to appointments     [] Depression, given chronic illness  [] Primary team notified     [] Goals of care need addressed  [] Palliative care consulted     [] CHF community health worker Rubina Patel consulted [phone], to assist with     Chart Reviewed:  Diet: ADULT DIET; Regular; 5 carb choices (75 gm/meal); Low Sodium (2 gm); 1500 ml   Daily Weights: Patient Vitals for the past 96 hrs (Last 3 readings):   Weight   06/27/25 0523 89.9 kg (198 lb 3.2 oz)   06/26/25 0600 89.5 kg (197 lb 4.8 oz)   06/25/25 1221 90.7 kg (200 lb)     I/O:   Intake/Output

## 2025-06-28 NOTE — PLAN OF CARE
Problem: ABCDS Injury Assessment  Goal: Absence of physical injury  6/27/2025 2109 by Mariana Yusuf RN  Outcome: Progressing  6/27/2025 1413 by Armaan Landry RN  Outcome: Progressing     Problem: Skin/Tissue Integrity  Goal: Skin integrity remains intact  Description: 1.  Monitor for areas of redness and/or skin breakdown  2.  Assess vascular access sites hourly  3.  Every 4-6 hours minimum:  Change oxygen saturation probe site  4.  Every 4-6 hours:  If on nasal continuous positive airway pressure, respiratory therapy assess nares and determine need for appliance change or resting period  6/27/2025 2109 by Mariana Yusuf RN  Outcome: Progressing  6/27/2025 1413 by Armaan Landry RN  Outcome: Progressing     Problem: Safety - Adult  Goal: Free from fall injury  6/27/2025 2109 by Mariana Yusuf RN  Outcome: Progressing  6/27/2025 1413 by Armaan Landry RN  Outcome: Progressing     Problem: Chronic Conditions and Co-morbidities  Goal: Patient's chronic conditions and co-morbidity symptoms are monitored and maintained or improved  6/27/2025 2109 by Mariana Yusuf RN  Outcome: Progressing  6/27/2025 1413 by Armaan Landry RN  Outcome: Progressing     Problem: Discharge Planning  Goal: Discharge to home or other facility with appropriate resources  6/27/2025 2109 by Mariana Yusuf RN  Outcome: Progressing  6/27/2025 1413 by Armaan Landry RN  Outcome: Progressing

## 2025-06-28 NOTE — CARE COORDINATION
Care Coordination:  discharge order noted.  Wife at bedside asking regarding a portable concentrator.  Os provider is [facility].  Sw left a message to Sherrie to follow up with patient.  Informed wife of process and informed RN  all set to discharge.

## 2025-06-28 NOTE — PROGRESS NOTES
[facility]  Internal Medicine Residency Program  Progress Note - House Team 1    Patient:  Wander Rocha 70 y.o. male MRN: [id number]     Date of Service: 6/28/2025     CC: hypoxia    Days since admission: 2    Subjective     Overnight events: No acute overnight events    He was seen and examined at bedside this morning. He had no acute concerns today. He said that he is almost back to his baseline. His shortness of breath has improved. He is on 3 L/min via nasal cannula. He is able to eat, drink, urinate, and move his bowels appropriately. He is able to ambulate.    Objective     VS: BP 91/74   Pulse 67   Temp 97.7 °F (36.5 °C) (Oral)   Resp 20   Ht 1.702 m (5' 7\")   Wt 89.9 kg (198 lb 3.2 oz)   SpO2 92%   BMI 31.04 kg/m²         I & O - 24hr:   Intake/Output Summary (Last 24 hours) at 6/28/2025 0803  Last data filed at 6/28/2025 0703  Gross per 24 hour   Intake 600 ml   Output 1500 ml   Net -900 ml     Net IO Since Admission: -3,226 mL [06/28/25 0803]    Physical Exam:  General Appearance: in mild distress, conversant  HEENT: Normocephalic, atraumatic (+) JVD  Neck: midline trachea  Lung: crackles bilaterally, improved  Heart: regular rate and rhythm, no murmur  Abdomen: distended, tympanitic  Extremities: 2+ bipedal edema, improved  Musculokeletal: No joint swelling  Neurologic: Mental status: awake, alert, and oriented    Interventions     Procedures: None    Airway: Nasal cannula at 3 L/min     Medications     Continuous Infusions:   sodium chloride       Scheduled Meds:   [Held by provider] metOLazone  5 mg Oral Daily    epoetin lay-epbx  10,000 Units SubCUTAneous Weekly    sodium chloride flush  5-40 mL IntraVENous 2 times per day    insulin lispro  0-4 Units SubCUTAneous Q4H    apixaban  5 mg Oral BID    atorvastatin  40 mg Oral Daily    bumetanide  2 mg Oral BID    ferrous sulfate  325 mg Oral Every Other Day    levothyroxine  150 mcg Oral Daily    metoprolol succinate  12.5 mg

## 2025-06-30 ENCOUNTER — TELEPHONE (OUTPATIENT)
Age: 70
End: 2025-06-30

## 2025-06-30 NOTE — TELEPHONE ENCOUNTER
Care Transitions Initial Follow Up Call    Outreach made within 2 business days of discharge: Yes    Patient: Wander Rocha Patient : 1955   MRN: [id number]  Reason for Admission: Acute Kidney Injury  Discharge Date: 25       Spoke with: VM message left for patient to notify calling regarding recent hospital discharge. Phone number left for patient to return call.     Discharge department/facility: [facility]      Scheduled appointment with PCP within 7-14 days    Follow Up  Future Appointments   Date Time Provider Department Center   2025  7:30 AM [facility] CHF ROOM 1 [facility]   2025 10:15 AM Oly Crespo MD [facility]  BS E[facility] DEP   2025  7:30 AM Ambar Cooper PA [facility]   2025 10:00 AM Colin Soni DO [place] [facility]   2025  7:45 AM [facility] ROOM 2 [facility]   2025  9:30 AM Martell Momin, JACQUELINE - CNP ELECTRO PHYS [facility]   2025  9:30 AM SCHEDULE, DEVICE CLINIC 1 [facility]LECTRO [facility]   2025 10:00 AM Henrik Fontanez MD [place] [facility]       Jackelin Cobos RN

## 2025-07-01 ENCOUNTER — HOSPITAL ENCOUNTER (OUTPATIENT)
Age: 70
Discharge: HOME | End: 2025-07-01
Payer: MEDICARE

## 2025-07-01 ENCOUNTER — HOSPITAL ENCOUNTER (OUTPATIENT)
Dept: OTHER | Age: 70
Setting detail: THERAPIES SERIES
Discharge: HOME | End: 2025-07-01
Payer: MEDICARE

## 2025-07-01 VITALS
WEIGHT: 198 LBS | OXYGEN SATURATION: 96 % | BODY MASS INDEX: 31.01 KG/M2 | RESPIRATION RATE: 18 BRPM | HEART RATE: 73 BPM | DIASTOLIC BLOOD PRESSURE: 55 MMHG | SYSTOLIC BLOOD PRESSURE: 115 MMHG

## 2025-07-01 LAB
ANION GAP SERPL CALCULATED.3IONS-SCNC: 13 MMOL/L (ref 7–16)
ANION GAP SERPL CALCULATED.3IONS-SCNC: 14 MMOL/L (ref 7–16)
BASOPHILS # BLD: 0.08 K/UL (ref 0–0.2)
BASOPHILS NFR BLD: 2 % (ref 0–2)
BNP SERPL-MCNC: 6166 PG/ML (ref 0–125)
BUN SERPL-MCNC: 65 MG/DL (ref 8–23)
BUN SERPL-MCNC: 66 MG/DL (ref 8–23)
CALCIUM SERPL-MCNC: 9.4 MG/DL (ref 8.8–10.2)
CHLORIDE SERPL-SCNC: 90 MMOL/L (ref 98–107)
CHLORIDE SERPL-SCNC: 90 MMOL/L (ref 98–107)
CO2 SERPL-SCNC: 27 MMOL/L (ref 22–29)
CO2 SERPL-SCNC: 29 MMOL/L (ref 22–29)
CREAT SERPL-MCNC: 1.9 MG/DL (ref 0.7–1.2)
CREAT UR-MCNC: 29.6 MG/DL (ref 40–278)
EOSINOPHILS RELATIVE PERCENT: 9 % (ref 0–6)
ERYTHROCYTE [DISTWIDTH] IN BLOOD BY AUTOMATED COUNT: 18.7 % (ref 11.5–15)
GFR, ESTIMATED: 38 ML/MIN/1.73M2
GFR, ESTIMATED: 39 ML/MIN/1.73M2
GLUCOSE SERPL-MCNC: 129 MG/DL (ref 74–99)
GLUCOSE SERPL-MCNC: 155 MG/DL (ref 74–99)
HCT VFR BLD AUTO: 24.2 % (ref 37–54)
HGB BLD-MCNC: 7.6 G/DL (ref 12.5–16.5)
LYMPHOCYTES NFR BLD: 0.48 K/UL (ref 1.5–4)
LYMPHOCYTES RELATIVE PERCENT: 10 % (ref 20–42)
MAGNESIUM SERPL-MCNC: 1.9 MG/DL (ref 1.6–2.4)
MCH RBC QN AUTO: 29.7 PG (ref 26–35)
MCHC RBC AUTO-ENTMCNC: 31.4 G/DL (ref 32–34.5)
MCV RBC AUTO: 94.5 FL (ref 80–99.9)
MICROALBUMIN UR-MCNC: <12 MG/L (ref 0–20)
MICROALBUMIN/CREAT UR-RTO: <41 MCG/MG CREAT (ref 0–30)
MICROORGANISM SPEC CULT: NORMAL
MICROORGANISM/AGENT SPEC: NORMAL
MONOCYTES NFR BLD: 0.36 K/UL (ref 0.1–0.95)
MONOCYTES NFR BLD: 8 % (ref 2–12)
MYELOCYTES ABSOLUTE COUNT: 0.16 K/UL
MYELOCYTES: 4 %
NEUTROPHILS NFR BLD: 68 % (ref 43–80)
NEUTS SEG NFR BLD: 3.12 K/UL (ref 1.8–7.3)
PHOSPHATE SERPL-MCNC: 4.4 MG/DL (ref 2.5–4.5)
PLATELET # BLD AUTO: 148 K/UL (ref 130–450)
PMV BLD AUTO: 10.7 FL (ref 7–12)
POTASSIUM SERPL-SCNC: 3.6 MMOL/L (ref 3.5–5.1)
POTASSIUM SERPL-SCNC: 3.7 MMOL/L (ref 3.5–5.1)
RBC # BLD: ABNORMAL 10*6/UL
SODIUM SERPL-SCNC: 131 MMOL/L (ref 136–145)
SODIUM SERPL-SCNC: 131 MMOL/L (ref 136–145)
SPECIMEN DESCRIPTION: NORMAL
URATE SERPL-MCNC: 8.3 MG/DL (ref 3.4–7)
WBC OTHER # BLD: 4.6 K/UL (ref 4.5–11.5)

## 2025-07-01 PROCEDURE — 84550 ASSAY OF BLOOD/URIC ACID: CPT

## 2025-07-01 PROCEDURE — 99214 OFFICE O/P EST MOD 30 MIN: CPT

## 2025-07-01 PROCEDURE — 82043 UR ALBUMIN QUANTITATIVE: CPT

## 2025-07-01 PROCEDURE — 80048 BASIC METABOLIC PNL TOTAL CA: CPT

## 2025-07-01 PROCEDURE — 83735 ASSAY OF MAGNESIUM: CPT

## 2025-07-01 PROCEDURE — 85025 COMPLETE CBC W/AUTO DIFF WBC: CPT

## 2025-07-01 PROCEDURE — 84100 ASSAY OF PHOSPHORUS: CPT

## 2025-07-01 PROCEDURE — 84156 ASSAY OF PROTEIN URINE: CPT

## 2025-07-01 PROCEDURE — 83880 ASSAY OF NATRIURETIC PEPTIDE: CPT

## 2025-07-01 PROCEDURE — 36415 COLL VENOUS BLD VENIPUNCTURE: CPT

## 2025-07-01 PROCEDURE — 82570 ASSAY OF URINE CREATININE: CPT

## 2025-07-01 NOTE — PLAN OF CARE
Problem: Chronic Conditions and Co-morbidities  Goal: Patient's chronic conditions and co-morbidity symptoms are monitored and maintained or improved  Flowsheets (Taken 7/1/2025 7355)  Care Plan - Patient's Chronic Conditions and Co-Morbidity Symptoms are Monitored and Maintained or Improved: Monitor and assess patient's chronic conditions and comorbid symptoms for stability, deterioration, or improvement

## 2025-07-01 NOTE — PROGRESS NOTES
Congestive Heart Failure Clinic   [facility]       Referring Provider: -  Primary Care Physician: Oly Crespo MD   Cardiologist: Huseyin  Nephrologist: Kali      HISTORY OF PRESENT ILLNESS:     Wander Rocha is a 70 y.o. (1955) male with a history of HFpEF, most recent EF:  Lab Results   Component Value Date    LVEF 50 04/29/2025    LVEFMODE Echo 04/29/2025     He presents to the CHF clinic for ongoing evaluation and monitoring of heart failure.    In the CHF clinic today he denies any adverse symptoms except:  [x] Dizziness or lightheadedness - improved  [] Syncope or near syncope  [] Recent Fall  [] Shortness of breath at rest   [x] Dyspnea with exertion  [] Decline in functional capacity (unable to perform activities they had previously been able to do)  [] Fatigue   [] Orthopnea  [] PND  [] Nocturnal cough  [] Hemoptysis  [] Chest pain, pressure, or discomfort  [] Palpitations  [] Abdominal distention  [] Abdominal bloating  [] Early satiety  [] Blood in stool   [] Diarrhea  [] Constipation  [] Nausea/Vomiting  [] Decreased urinary response to oral diuretic   [] Scrotal swelling   [x] Lower extremity edema- improved  [] Used PRN doses of oral diuretic   [] Weight gain       Wt Readings from Last 3 Encounters:   07/01/25 89.8 kg (198 lb)   06/27/25 89.9 kg (198 lb 3.2 oz)   06/24/25 93 kg (205 lb)       SOCIAL HISTORY:  [x] Denies tobacco, alcohol or illicit drug abuse  [] Tobacco use:  [] ETOH use:  [] Illicit drug use:        MEDICATIONS:    Allergies   Allergen Reactions    Jardiance [Empagliflozin] Other (See Comments)     Episode of euglycemic DKA 5/2025; no further benefit seen in continuing therapy       Current Outpatient Medications:     apixaban (ELIQUIS) 5 MG TABS tablet, Take 1 tablet by mouth 2 times daily, Disp: 60 tablet, Rfl: 1    metoprolol succinate (TOPROL XL) 25 MG extended release tablet, Take 0.5 tablets by mouth daily, Disp: 30 tablet, Rfl: 3

## 2025-07-02 LAB
TOTAL PROTEIN, URINE: <6 MG/DL (ref 0–12)
URINE TOTAL PROTEIN CREATININE RATIO: <0.2 (ref 0–0.2)

## 2025-07-08 ENCOUNTER — OFFICE VISIT (OUTPATIENT)
Age: 70
End: 2025-07-08

## 2025-07-08 VITALS
TEMPERATURE: 97.7 F | DIASTOLIC BLOOD PRESSURE: 52 MMHG | HEIGHT: 67 IN | OXYGEN SATURATION: 93 % | HEART RATE: 72 BPM | RESPIRATION RATE: 18 BRPM | WEIGHT: 203.2 LBS | BODY MASS INDEX: 31.89 KG/M2 | SYSTOLIC BLOOD PRESSURE: 105 MMHG

## 2025-07-08 DIAGNOSIS — E03.9 HYPOTHYROIDISM, UNSPECIFIED TYPE: Primary | ICD-10-CM

## 2025-07-08 DIAGNOSIS — D64.9 ANEMIA, UNSPECIFIED TYPE: ICD-10-CM

## 2025-07-08 DIAGNOSIS — I48.19 PERSISTENT ATRIAL FIBRILLATION (HCC): Chronic | ICD-10-CM

## 2025-07-08 DIAGNOSIS — I13.2 CARDIORENAL SYNDROME WITH RENAL FAILURE, STAGE 5 CHRONIC KIDNEY DISEASE OR END STAGE RENAL DISEASE, WITH HEART FAILURE (HCC): ICD-10-CM

## 2025-07-08 DIAGNOSIS — N18.32 STAGE 3B CHRONIC KIDNEY DISEASE (HCC): ICD-10-CM

## 2025-07-08 DIAGNOSIS — M1A.9XX0 CHRONIC GOUT WITHOUT TOPHUS, UNSPECIFIED CAUSE, UNSPECIFIED SITE: ICD-10-CM

## 2025-07-08 DIAGNOSIS — I50.22 HEART FAILURE WITH MID-RANGE EJECTION FRACTION (HFMEF) (HCC): ICD-10-CM

## 2025-07-08 DIAGNOSIS — G47.33 OSA (OBSTRUCTIVE SLEEP APNEA): Chronic | ICD-10-CM

## 2025-07-08 DIAGNOSIS — I10 ESSENTIAL HYPERTENSION: Chronic | ICD-10-CM

## 2025-07-08 RX ORDER — LEVOTHYROXINE SODIUM 100 UG/1
100 TABLET ORAL DAILY
COMMUNITY

## 2025-07-08 NOTE — ASSESSMENT & PLAN NOTE
Persistently high Cr 1.9, BUN 66, low eGFR (<40), and hyponatremia.  NT-proBNP remains >6000 ? likely reflects persistent volume overload.  Currently not on dialysis; nephrology deferred initiation during hospitalization.  Monitor renal function, volume status, electrolytes.

## 2025-07-08 NOTE — PROGRESS NOTES
Wander Rocha (:  1955) is a 70 y.o. male,{New vs Established:[id number]::\"Established patient\"}, here for evaluation of the following chief complaint(s):  No chief complaint on file.         Assessment & Plan      No follow-ups on file.       Subjective   HPI    Review of Systems       Objective   Physical Exam       {Time Documentation Optional:391149914}      An electronic signature was used to authenticate this note.    --LIZZETTE ORTIZ MD   
  [facility]  Internal Medicine Residency Clinic  Attending Physician Statement:  Martell Rosenberg M.D., F.A.C.P.  Patient is seen for fu visit today.  -- acute and chronic problems addressed  Addressed when applicable- Health maintenance issues of vaccinations, social determinants/depression/CA screening, tobacco cessation etc...  I have seen/discussed the case, including pertinent history and exam findings with the resident, review of last visit medical records/labs-   I agree with the assessment, plan and orders as documented by the resident.    -Billiing assessed by medical complexity of case  My assessment of high points of todays visit as follows:    Hospital fu - 7-14 days, mod- high complexity     Hospital Course:   6/25/25-     Wander Rocha is a 70 y.o. male with history of heart failure with improved ejection fraction (EF 50 to 55%), persistent atrial fibrillation, sinus node disorder s/p dual-chamber pacemaker insertion (04/23/2025) s/p CRT-D (04/23/2025), hypertension, hyperlipidemia, CKD 3B, T2DM, hypothyroidism, admitted for management of RADHA (acute kidney injury).   He presented to the ED with increasing abdominal distention and bilateral lower leg swelling over the past few days.  He was seen in CHF clinic for 2-week follow-up, where he was found to be hypoxic (83%) and placed on supplemental oxygen (3 to 4 L/min) which improved his saturation to 94%.  He was also noted to be hypervolemic with a weight gain of 22 pounds, and labs showed a creatinine of 2.6.  2 mg of IV Bumex was given, and he was advised to go to the ED.  Upon arrival, he was normotensive (112/53), afebrile, and was saturating at 95% on 4 L/min.  Initial labs pertinent for elevated creatinine of 2.4, elevated proBNP at 6163.  CBC showed anemia (8.5).  UA was unremarkable.  EKG showed AV dual paced rhythm.  CXR showed a small to moderate right pleural effusion with associated right basilar airspace disease, more 
scale Lispro, and Synthroid 150 mcg daily.         Assessment & Plan  Hypothyroidism, unspecified type     Continue Synthroid 150 mcg daily      Orders:    TSH; Future    T3; Future    Chronic gout without tophus, unspecified cause, unspecified site     Uric Acid: 8.3 mg/dL (persistently elevated in past)  No acute flare at present.    Orders:    Uric Acid; Future    Stage 3b chronic kidney disease (HCC)     Phosphorus and magnesium stable on recent labs.  Orders:    Comprehensive Metabolic Panel; Future    Magnesium; Future    Anemia, unspecified type    Continue epoetin lay 10,000 units every 2 weeks  Monitor hemoglobin and iron studies      Orders:    Ferritin; Future    Iron and TIBC; Future    Essential hypertension     Continue current antihypertensives and statin.       Persistent atrial fibrillation (HCC)     Renal function stable since discharge  Nephrology follow-up to continue  No current need for dialysis       Heart failure with mid-range ejection fraction (HFmEF) (HCC)     Increasing O2 requirement from 2L to 3L post-discharge.  Continue outpatient follow-up  Monitor daily weights and fluid intake         Cardiorenal syndrome with renal failure, stage 5 chronic kidney disease or end stage renal disease, with heart failure (HCC)     Persistently high Cr 1.9, BUN 66, low eGFR (<40), and hyponatremia.  NT-proBNP remains >6000 ? likely reflects persistent volume overload.  Currently not on dialysis; nephrology deferred initiation during hospitalization.  Monitor renal function, volume status, electrolytes.       NIKO (obstructive sleep apnea)     Now on 2-3L NC.                     Objective     There were no vitals taken for this visit.     Physical Exam:  Vitals: BP (!) 105/52 (BP Site: Left Upper Arm, Patient Position: Sitting, BP Cuff Size: Medium Adult)   Pulse 72   Temp 97.7 °F (36.5 °C) (Temporal)   Resp 18   Ht 1.702 m (5' 7\")   Wt 92.2 kg (203 lb 3.2 oz)   SpO2 93%   BMI 31.83 kg/m²     I &

## 2025-07-08 NOTE — ASSESSMENT & PLAN NOTE
Uric Acid: 8.3 mg/dL (persistently elevated in past)  No acute flare at present.    Orders:    Uric Acid; Future

## 2025-07-08 NOTE — PATIENT INSTRUCTIONS
Dear Wander Rocha,    Thank you for coming to your appointment at [facility].    Please make sure to do the following:    - Continue medications as listed and contact our office if medications are unavailable at the pharmacy.    - If lab work was ordered please complete as instructed.    - If a referral was placed to another doctor's office, please contact our office in 5 business days if you have not heard from that office regarding the referral.    - If any imaging test was ordered at today's visit (ultrasound, CT scan, or MRI), expect a phone call to schedule in the next 5 business days. You may also call [facility] Imaging Scheduling department at [phone] to schedule.    Contact our office if you develop any new or worsening symptoms or if you have any questions regarding today's visit.    We aim to address your healthcare needs to your satisfaction!    Sincerely,  LIZZETTE ORTIZ MD  7/8/2025  11:54 AM

## 2025-07-08 NOTE — ASSESSMENT & PLAN NOTE
Renal function stable since discharge  Nephrology follow-up to continue  No current need for dialysis

## 2025-07-09 ENCOUNTER — OFFICE VISIT (OUTPATIENT)
Age: 70
End: 2025-07-09
Payer: MEDICARE

## 2025-07-09 ENCOUNTER — HOSPITAL ENCOUNTER (OUTPATIENT)
Age: 70
Discharge: HOME | End: 2025-07-09
Payer: MEDICARE

## 2025-07-09 ENCOUNTER — RESULTS FOLLOW-UP (OUTPATIENT)
Age: 70
End: 2025-07-09

## 2025-07-09 ENCOUNTER — TELEPHONE (OUTPATIENT)
Age: 70
End: 2025-07-09

## 2025-07-09 VITALS
HEART RATE: 82 BPM | WEIGHT: 201 LBS | DIASTOLIC BLOOD PRESSURE: 56 MMHG | RESPIRATION RATE: 20 BRPM | OXYGEN SATURATION: 96 % | BODY MASS INDEX: 31.48 KG/M2 | SYSTOLIC BLOOD PRESSURE: 119 MMHG

## 2025-07-09 DIAGNOSIS — N18.30 STAGE 3 CHRONIC KIDNEY DISEASE, UNSPECIFIED WHETHER STAGE 3A OR 3B CKD (HCC): ICD-10-CM

## 2025-07-09 DIAGNOSIS — E03.9 HYPOTHYROIDISM, UNSPECIFIED TYPE: ICD-10-CM

## 2025-07-09 DIAGNOSIS — I50.32 HEART FAILURE WITH IMPROVED EJECTION FRACTION (HFIMPEF) (HCC): Primary | ICD-10-CM

## 2025-07-09 DIAGNOSIS — M1A.9XX0 CHRONIC GOUT WITHOUT TOPHUS, UNSPECIFIED CAUSE, UNSPECIFIED SITE: ICD-10-CM

## 2025-07-09 DIAGNOSIS — I50.20 HFREF (HEART FAILURE WITH REDUCED EJECTION FRACTION) (HCC): Primary | ICD-10-CM

## 2025-07-09 DIAGNOSIS — E87.70 HYPERVOLEMIA, UNSPECIFIED HYPERVOLEMIA TYPE: ICD-10-CM

## 2025-07-09 DIAGNOSIS — N18.32 STAGE 3B CHRONIC KIDNEY DISEASE (HCC): ICD-10-CM

## 2025-07-09 DIAGNOSIS — D64.9 ANEMIA, UNSPECIFIED TYPE: ICD-10-CM

## 2025-07-09 LAB
ALBUMIN SERPL-MCNC: 3.7 G/DL (ref 3.5–5.2)
ALP SERPL-CCNC: 102 U/L (ref 40–129)
ALT SERPL-CCNC: 20 U/L (ref 0–50)
ANION GAP SERPL CALCULATED.3IONS-SCNC: 15 MMOL/L (ref 7–16)
ANION GAP SERPL CALCULATED.3IONS-SCNC: 15 MMOL/L (ref 7–16)
AST SERPL-CCNC: 31 U/L (ref 0–50)
BILIRUB SERPL-MCNC: 0.5 MG/DL (ref 0–1.2)
BUN BLDV-MCNC: 62 MG/DL (ref 8–23)
BUN SERPL-MCNC: 61 MG/DL (ref 8–23)
CALCIUM SERPL-MCNC: 8.9 MG/DL (ref 8.8–10.2)
CALCIUM SERPL-MCNC: 9.3 MG/DL (ref 8.8–10.2)
CHLORIDE BLD-SCNC: 86 MMOL/L (ref 98–107)
CHLORIDE SERPL-SCNC: 87 MMOL/L (ref 98–107)
CO2 SERPL-SCNC: 27 MMOL/L (ref 22–29)
CO2: 26 MMOL/L (ref 22–29)
CREAT SERPL-MCNC: 2.4 MG/DL (ref 0.7–1.2)
CREAT SERPL-MCNC: 2.5 MG/DL (ref 0.7–1.2)
FERRITIN SERPL-MCNC: 97 NG/ML
GFR, ESTIMATED: 27 ML/MIN/1.73M2
GFR, ESTIMATED: 28 ML/MIN/1.73M2
GLUCOSE BLD-MCNC: 140 MG/DL (ref 74–99)
GLUCOSE SERPL-MCNC: 129 MG/DL (ref 74–99)
IRON SATN MFR SERPL: 12 % (ref 20–55)
IRON SERPL-MCNC: 44 UG/DL (ref 61–157)
MAGNESIUM SERPL-MCNC: 2.3 MG/DL (ref 1.6–2.4)
NT PRO BNP: 3517 PG/ML (ref 0–125)
POTASSIUM SERPL-SCNC: 3.9 MMOL/L (ref 3.5–5.1)
POTASSIUM SERPL-SCNC: 3.9 MMOL/L (ref 3.5–5.1)
PROT SERPL-MCNC: 7.5 G/DL (ref 6.4–8.3)
SODIUM BLD-SCNC: 127 MMOL/L (ref 136–145)
SODIUM SERPL-SCNC: 129 MMOL/L (ref 136–145)
T3 SERPL-MCNC: 73 NG/DL (ref 80–200)
TIBC SERPL-MCNC: 368 UG/DL (ref 250–450)
TSH SERPL DL<=0.05 MIU/L-ACNC: 6.69 UIU/ML (ref 0.27–4.2)
URATE SERPL-MCNC: 6.8 MG/DL (ref 3.4–7)

## 2025-07-09 PROCEDURE — 84550 ASSAY OF BLOOD/URIC ACID: CPT

## 2025-07-09 PROCEDURE — 1159F MED LIST DOCD IN RCRD: CPT | Performed by: PHYSICIAN ASSISTANT

## 2025-07-09 PROCEDURE — 84480 ASSAY TRIIODOTHYRONINE (T3): CPT

## 2025-07-09 PROCEDURE — 99214 OFFICE O/P EST MOD 30 MIN: CPT | Performed by: PHYSICIAN ASSISTANT

## 2025-07-09 PROCEDURE — 36415 COLL VENOUS BLD VENIPUNCTURE: CPT | Performed by: PHYSICIAN ASSISTANT

## 2025-07-09 PROCEDURE — G8427 DOCREV CUR MEDS BY ELIG CLIN: HCPCS | Performed by: PHYSICIAN ASSISTANT

## 2025-07-09 PROCEDURE — 3078F DIAST BP <80 MM HG: CPT | Performed by: PHYSICIAN ASSISTANT

## 2025-07-09 PROCEDURE — 3017F COLORECTAL CA SCREEN DOC REV: CPT | Performed by: PHYSICIAN ASSISTANT

## 2025-07-09 PROCEDURE — 36415 COLL VENOUS BLD VENIPUNCTURE: CPT

## 2025-07-09 PROCEDURE — 82728 ASSAY OF FERRITIN: CPT

## 2025-07-09 PROCEDURE — 1123F ACP DISCUSS/DSCN MKR DOCD: CPT | Performed by: PHYSICIAN ASSISTANT

## 2025-07-09 PROCEDURE — 1036F TOBACCO NON-USER: CPT | Performed by: PHYSICIAN ASSISTANT

## 2025-07-09 PROCEDURE — 83540 ASSAY OF IRON: CPT

## 2025-07-09 PROCEDURE — 80053 COMPREHEN METABOLIC PANEL: CPT

## 2025-07-09 PROCEDURE — 96374 THER/PROPH/DIAG INJ IV PUSH: CPT | Performed by: PHYSICIAN ASSISTANT

## 2025-07-09 PROCEDURE — 1111F DSCHRG MED/CURRENT MED MERGE: CPT | Performed by: PHYSICIAN ASSISTANT

## 2025-07-09 PROCEDURE — 84443 ASSAY THYROID STIM HORMONE: CPT

## 2025-07-09 PROCEDURE — 83550 IRON BINDING TEST: CPT

## 2025-07-09 PROCEDURE — 3074F SYST BP LT 130 MM HG: CPT | Performed by: PHYSICIAN ASSISTANT

## 2025-07-09 PROCEDURE — G8417 CALC BMI ABV UP PARAM F/U: HCPCS | Performed by: PHYSICIAN ASSISTANT

## 2025-07-09 PROCEDURE — 83735 ASSAY OF MAGNESIUM: CPT

## 2025-07-09 RX ORDER — SODIUM CHLORIDE 0.9 % (FLUSH) 0.9 %
5-40 SYRINGE (ML) INJECTION ONCE
Status: COMPLETED | OUTPATIENT
Start: 2025-07-09 | End: 2025-07-09

## 2025-07-09 RX ORDER — BUMETANIDE 0.25 MG/ML
2 INJECTION, SOLUTION INTRAMUSCULAR; INTRAVENOUS ONCE
Status: COMPLETED | OUTPATIENT
Start: 2025-07-09 | End: 2025-07-09

## 2025-07-09 RX ADMIN — BUMETANIDE 2 MG: 0.25 INJECTION, SOLUTION INTRAMUSCULAR; INTRAVENOUS at 08:07

## 2025-07-09 RX ADMIN — Medication 10 ML: at 08:17

## 2025-07-09 ASSESSMENT — ENCOUNTER SYMPTOMS
COLOR CHANGE: 0
SHORTNESS OF BREATH: 1
NAUSEA: 0
VOMITING: 0
CHEST TIGHTNESS: 0
ABDOMINAL DISTENTION: 0

## 2025-07-09 NOTE — RESULT ENCOUNTER NOTE
Labs reviewed    HFimpEF    Current GDMT:  Bumex 2 mg p.o. twice daily  Metolazone 5 mg p.o. daily    Treated with IV Bumex in clinic today due to hypervolemia with increasing bilateral lower extremity edema and mild weight gain.    BMP shows worsening hyponatremia with sodium 127 compared to 131 on 7/1/2025.  Worsening renal function with creatinine 2.5 compared to 1.8 on 7/1/2025.  BUN elevated at 62 which is stable from 65 on 7/1/2025.    1.  Please call Dr. Bass's office and fax blood work from today to his office.  Please notify him that patient received IV Bumex in clinic today and ask what he would recommend regarding diuretic adjustment in light of worsening renal function and hyponatremia      --Will call patient once we hear back from Dr. Bass's office with recommendations  2.  Advised patient to limit fluid/water intake to 48 ounces daily  3.  Please see if we can get patient follow-up appointment in CHF clinic next week instead of 7/21/2025

## 2025-07-09 NOTE — PATIENT INSTRUCTIONS
Give dose of IV Bumex in clinic and check labs.  Will call with any further medication adjustments follow review of blood work  Continue cardiac meds the same  Recommend 2000 mg daily sodium restriction  Check daily weights every morning  Notify CHF clinic with any rapid weight gain of 3 pounds or more in 1 to 2 days or 5 pounds in 1 week  Please notify clinic if agreeable to sleep medicine referral for NIKO evaluation/treatment  Follow-up in CHF clinic on 7/21/2025 as scheduled  Follow-up with Dr. Fontanez in 2 to 3 months    -Weigh yourself daily    -Stay Hydrated, 64 oz fluid daily    -Diet should be sodium restricted to 2 grams    -Again watch your daily weight trends and if you gain water weight please follow below instructions.    -If you gain 3-5 pounds in 2-3 days OR notice that you are retaining fluid in anyway just like you did before then take an extra dose of your water pill (bumetanide/Bumex every day until you lose the weight or feel better.     -If you notice that you have taken more than 2 extra doses in 1 week then please call and let us know.    -If at any time you feel that you are retaining fluid, your medications are not working, or you feel ill in anyway, then please call us for either same day appointment or the next day, and for instructions. Our goal is to keep you out of the emergency room and the hospital and we have ways to do it. You just need to call us in a timely manner.     -If you become sick for other reasons, and notice that you are not urinating as much, the urine is very dark, you have significant diarrhea or vomiting, then please DO NOT take your water pill and CALL US immediately.

## 2025-07-09 NOTE — PROGRESS NOTES
Congestive Heart Failure Clinic   [facility]       Reason for Visit: Heart Failure     Primary Cardiologist: Dr. Fontanez  Electrophysiology: Dr. Pereira        History of Present Illness:     Mr. Rocha is a 70-year-old male with PMHx significant for chronic HFimpEF, history of cardiomyopathy with improved LV function and EF 50 to 55% per limited TTE on 4/29/2025, history of sinus node dysfunction s/p [employer] PPM implant in 10/2017 s/p CRT-P upgrade 4/23/2025, valvular heart disease, persistent atrial fibrillation, history of SVT, hypertension, hyperlipidemia, T2DM, COPD, NIKO on CPAP, hypothyroidism, CKD, chronic anemia, hyponatremia and obesity.    Hospitalized 4/23/2025 through 5/9/2025 for RADHA on CKD, acute on chronic HFimpEF, and hyponatremia.  He was evaluated by cardiology during admission as well as nephrology who help to guide diuresis.  He was initially on Bumex drip which was later discontinued.  He received albumin, IV iron and fluid restricted to 1 L.  He had limited TTE completed on 4/29/2025 which revealed LVEF 50 to 55%, normal RV function, moderate MR, moderate TR, mildly elevated RVSP of 47 mmHg.  He was eventually discharged on 5/9/2025.  Cardiac meds at discharge included Eliquis 5 mg p.o. twice daily, aspirin 81 mg p.o. daily, Lipitor 40 mg daily, Bumex 2 mg p.o. twice daily, Jardiance 10 mg p.o. daily, lisinopril 5 mg p.o. daily, Toprol-XL 25 mg p.o. daily.    Hospitalized 5/14/2025 through 5/29/2025 with acute hypoxic respiratory failure, acute HFimpEF, RADHA on CKD.  Cardiology and nephrology consulted.  He was initially treated with IV Bumex and later started on Bumex drip.  He was treated with IV antibiotics with vancomycin and Zosyn for suspected infection and initiated on vasopressor support due to hypotension.  He had tunneled hemodialysis catheter placed.  He also had chest tube placed for right pleural effusion.  He was noted to have DKA during

## 2025-07-09 NOTE — TELEPHONE ENCOUNTER
----- Message from CURTIS Gordillo sent at 7/9/2025 10:44 AM EDT -----  Labs reviewed    HFimpEF    Current GDMT:  Bumex 2 mg p.o. twice daily  Metolazone 5 mg p.o. daily    Treated with IV Bumex in clinic today due to hypervolemia with increasing bilateral lower extremity edema and mild weight gain.    BMP shows worsening hyponatremia with sodium 127 compared to 131 on 7/1/2025.  Worsening renal function with creatinine 2.5 compared to 1.8 on 7/1/2025.  BUN elevated at 62 which is stable from 65 on 7/1/2025.    1.  Please call Dr. Bass's office and fax blood work from today to his office.  Please notify him that patient received IV Bumex in clinic today and ask what he would recommend regarding diuretic adjustment in light of worsening renal function and hyponatremia      --Will call patient once we hear back from Dr. Bass's office with recommendations  2.  Advised patient to limit fluid/water intake to 48 ounces daily  3.  Please see if we can get patient follow-up appointment in CHF clinic next week instead of 7/21/2025

## 2025-07-10 ENCOUNTER — TELEPHONE (OUTPATIENT)
Age: 70
End: 2025-07-10

## 2025-07-10 NOTE — TELEPHONE ENCOUNTER
Spoke with patient informing of instructions below per CURTIS Gordillo    Patient verbalized understanding.

## 2025-07-10 NOTE — TELEPHONE ENCOUNTER
Spoke with patient's wife informing of instructions below per CURTIS Gordillo's wife verbalized understanding.    Will call Dr. Bass's office this morning to check on recommendations.

## 2025-07-10 NOTE — TELEPHONE ENCOUNTER
----- Message from CURTIS Gordillo sent at 7/10/2025  6:29 AM EDT -----  Regarding: Lab result and recommendations  Can you please call patient this morning and advise him to hold his metolazone for now.    I had spoken with his wife last night and let her know of this recommendation while awaiting to hear back from nephrology regarding their recommendations in light of worsening hyponatremia and worsening renal function that I want to verify that patient is holding metolazone for now.    Check repeat BMP tomorrow.  I spoke with Ethel yesterday and she put the repeat BMP under her for review.  If we hear back from nephrology, we will call patient to notify of any other instructions.  If not, we will await BMP tomorrow for further instructions regarding diuretics and metolazone.    He has follow-up with CHF clinic on 7/17/2025 which I would like for him to keep.    Thank you

## 2025-07-11 ENCOUNTER — TELEPHONE (OUTPATIENT)
Dept: OTHER | Age: 70
End: 2025-07-11

## 2025-07-11 NOTE — TELEPHONE ENCOUNTER
Renal group called CHF Clinic  per Dr. Bass do not make any medication changes until pt is seen by Dr. Bass.    Voice message left on pt.s' phone with above orders.    Spoke to pt.'s wife, Petra and above instructions given. (Do not hold Metolazone as advised), or change any medications until seen by Dr. Bass. Wife repeated instructions and verbalized understanding.

## 2025-07-17 ENCOUNTER — APPOINTMENT (OUTPATIENT)
Dept: ULTRASOUND IMAGING | Age: 70
DRG: 987 | End: 2025-07-17
Payer: MEDICARE

## 2025-07-17 ENCOUNTER — HOSPITAL ENCOUNTER (OUTPATIENT)
Dept: OTHER | Age: 70
Setting detail: THERAPIES SERIES
Discharge: HOME | End: 2025-07-17
Payer: MEDICARE

## 2025-07-17 ENCOUNTER — HOSPITAL ENCOUNTER (INPATIENT)
Age: 70
LOS: 8 days | Discharge: HOME | DRG: 987 | End: 2025-07-25
Admitting: CHIROPRACTOR
Payer: MEDICARE

## 2025-07-17 ENCOUNTER — APPOINTMENT (OUTPATIENT)
Dept: GENERAL RADIOLOGY | Age: 70
DRG: 987 | End: 2025-07-17
Payer: MEDICARE

## 2025-07-17 VITALS — SYSTOLIC BLOOD PRESSURE: 109 MMHG | BODY MASS INDEX: 31.01 KG/M2 | WEIGHT: 198 LBS | DIASTOLIC BLOOD PRESSURE: 55 MMHG

## 2025-07-17 DIAGNOSIS — N17.9 AKI (ACUTE KIDNEY INJURY): ICD-10-CM

## 2025-07-17 DIAGNOSIS — I50.9 ACUTE ON CHRONIC CONGESTIVE HEART FAILURE, UNSPECIFIED HEART FAILURE TYPE (HCC): ICD-10-CM

## 2025-07-17 DIAGNOSIS — L85.3 DRY SKIN: ICD-10-CM

## 2025-07-17 DIAGNOSIS — E87.6 HYPOKALEMIA: ICD-10-CM

## 2025-07-17 DIAGNOSIS — D63.1 ANEMIA DUE TO CHRONIC KIDNEY DISEASE, UNSPECIFIED CKD STAGE: Primary | ICD-10-CM

## 2025-07-17 DIAGNOSIS — N18.9 ANEMIA DUE TO CHRONIC KIDNEY DISEASE, UNSPECIFIED CKD STAGE: Primary | ICD-10-CM

## 2025-07-17 DIAGNOSIS — K92.2 GASTROINTESTINAL HEMORRHAGE, UNSPECIFIED GASTROINTESTINAL HEMORRHAGE TYPE: ICD-10-CM

## 2025-07-17 DIAGNOSIS — F33.0 MAJOR DEPRESSIVE DISORDER, RECURRENT, MILD: ICD-10-CM

## 2025-07-17 DIAGNOSIS — M1A.9XX0 CHRONIC GOUT WITHOUT TOPHUS, UNSPECIFIED CAUSE, UNSPECIFIED SITE: ICD-10-CM

## 2025-07-17 LAB
ALBUMIN SERPL-MCNC: 3.6 G/DL (ref 3.5–5.2)
ALP SERPL-CCNC: 97 U/L (ref 40–129)
ALT SERPL-CCNC: 18 U/L (ref 0–50)
ANION GAP SERPL CALCULATED.3IONS-SCNC: 15 MMOL/L (ref 7–16)
ANION GAP SERPL CALCULATED.3IONS-SCNC: 18 MMOL/L (ref 7–16)
ANION GAP SERPL CALCULATED.3IONS-SCNC: 20 MMOL/L (ref 7–16)
AST SERPL-CCNC: 27 U/L (ref 0–50)
BACTERIA URNS QL MICRO: ABNORMAL
BASOPHILS # BLD: 0.05 K/UL (ref 0–0.2)
BASOPHILS NFR BLD: 1 % (ref 0–2)
BILIRUB SERPL-MCNC: 0.5 MG/DL (ref 0–1.2)
BILIRUB UR QL STRIP: NEGATIVE
BNP SERPL-MCNC: 7129 PG/ML (ref 0–125)
BNP SERPL-MCNC: 7571 PG/ML (ref 0–125)
BUN SERPL-MCNC: 91 MG/DL (ref 8–23)
BUN SERPL-MCNC: 91 MG/DL (ref 8–23)
BUN SERPL-MCNC: 92 MG/DL (ref 8–23)
CALCIUM SERPL-MCNC: 8.6 MG/DL (ref 8.8–10.2)
CALCIUM SERPL-MCNC: 8.9 MG/DL (ref 8.8–10.2)
CALCIUM SERPL-MCNC: 9.2 MG/DL (ref 8.8–10.2)
CHLORIDE SERPL-SCNC: 92 MMOL/L (ref 98–107)
CHLORIDE SERPL-SCNC: 92 MMOL/L (ref 98–107)
CHLORIDE SERPL-SCNC: 94 MMOL/L (ref 98–107)
CLARITY UR: CLEAR
CO2 SERPL-SCNC: 23 MMOL/L (ref 22–29)
CO2 SERPL-SCNC: 24 MMOL/L (ref 22–29)
CO2 SERPL-SCNC: 24 MMOL/L (ref 22–29)
COLOR UR: YELLOW
CREAT SERPL-MCNC: 2.9 MG/DL (ref 0.7–1.2)
CREAT SERPL-MCNC: 2.9 MG/DL (ref 0.7–1.2)
CREAT SERPL-MCNC: 3 MG/DL (ref 0.7–1.2)
CREAT UR-MCNC: 44.4 MG/DL (ref 40–278)
EOSINOPHIL # BLD: 0.33 K/UL (ref 0.05–0.5)
EOSINOPHILS RELATIVE PERCENT: 6 % (ref 0–6)
ERYTHROCYTE [DISTWIDTH] IN BLOOD BY AUTOMATED COUNT: 19.7 % (ref 11.5–15)
GFR, ESTIMATED: 22 ML/MIN/1.73M2
GFR, ESTIMATED: 22 ML/MIN/1.73M2
GFR, ESTIMATED: 23 ML/MIN/1.73M2
GLUCOSE BLD-MCNC: 158 MG/DL (ref 74–99)
GLUCOSE SERPL-MCNC: 136 MG/DL (ref 74–99)
GLUCOSE SERPL-MCNC: 137 MG/DL (ref 74–99)
GLUCOSE SERPL-MCNC: 177 MG/DL (ref 74–99)
GLUCOSE UR STRIP-MCNC: NEGATIVE MG/DL
HCT VFR BLD AUTO: 19 % (ref 37–54)
HGB BLD-MCNC: 6.1 G/DL (ref 12.5–16.5)
HGB UR QL STRIP.AUTO: NEGATIVE
IMM GRANULOCYTES # BLD AUTO: 0.26 K/UL (ref 0–0.58)
IMM GRANULOCYTES NFR BLD: 5 % (ref 0–5)
KETONES UR STRIP-MCNC: NEGATIVE MG/DL
LACTATE BLDV-SCNC: 1 MMOL/L (ref 0.5–2.2)
LEUKOCYTE ESTERASE UR QL STRIP: ABNORMAL
LYMPHOCYTES NFR BLD: 0.56 K/UL (ref 1.5–4)
LYMPHOCYTES RELATIVE PERCENT: 10 % (ref 20–42)
MAGNESIUM SERPL-MCNC: 2.6 MG/DL (ref 1.6–2.4)
MCH RBC QN AUTO: 31 PG (ref 26–35)
MCHC RBC AUTO-ENTMCNC: 32.1 G/DL (ref 32–34.5)
MCV RBC AUTO: 96.4 FL (ref 80–99.9)
MONOCYTES NFR BLD: 0.62 K/UL (ref 0.1–0.95)
MONOCYTES NFR BLD: 11 % (ref 2–12)
NEUTROPHILS NFR BLD: 67 % (ref 43–80)
NEUTS SEG NFR BLD: 3.75 K/UL (ref 1.8–7.3)
NITRITE UR QL STRIP: NEGATIVE
PH UR STRIP: 6 [PH] (ref 5–8)
PLATELET # BLD AUTO: 255 K/UL (ref 130–450)
PMV BLD AUTO: 9.6 FL (ref 7–12)
POTASSIUM SERPL-SCNC: 3.2 MMOL/L (ref 3.5–5.1)
POTASSIUM SERPL-SCNC: 3.4 MMOL/L (ref 3.5–5.1)
POTASSIUM SERPL-SCNC: 3.4 MMOL/L (ref 3.5–5.1)
PROT SERPL-MCNC: 7.2 G/DL (ref 6.4–8.3)
PROT UR STRIP-MCNC: NEGATIVE MG/DL
RBC # BLD AUTO: 1.97 M/UL (ref 3.8–5.8)
RBC # BLD: ABNORMAL 10*6/UL
RBC #/AREA URNS HPF: ABNORMAL /HPF
SODIUM SERPL-SCNC: 133 MMOL/L (ref 136–145)
SODIUM SERPL-SCNC: 133 MMOL/L (ref 136–145)
SODIUM SERPL-SCNC: 135 MMOL/L (ref 136–145)
SODIUM UR-SCNC: 78 MMOL/L
SP GR UR STRIP: 1.01 (ref 1–1.03)
TROPONIN I SERPL HS-MCNC: 73 NG/L (ref 0–22)
UROBILINOGEN UR STRIP-ACNC: 0.2 EU/DL (ref 0–1)
UUN UR-MCNC: 259 MG/DL (ref 800–1666)
WBC #/AREA URNS HPF: ABNORMAL /HPF
WBC OTHER # BLD: 5.6 K/UL (ref 4.5–11.5)

## 2025-07-17 PROCEDURE — 2500000003 HC RX 250 WO HCPCS

## 2025-07-17 PROCEDURE — 84484 ASSAY OF TROPONIN QUANT: CPT

## 2025-07-17 PROCEDURE — 6370000000 HC RX 637 (ALT 250 FOR IP): Performed by: PHYSICIAN ASSISTANT

## 2025-07-17 PROCEDURE — 84300 ASSAY OF URINE SODIUM: CPT

## 2025-07-17 PROCEDURE — 93005 ELECTROCARDIOGRAM TRACING: CPT | Performed by: EMERGENCY MEDICINE

## 2025-07-17 PROCEDURE — 6370000000 HC RX 637 (ALT 250 FOR IP)

## 2025-07-17 PROCEDURE — 83605 ASSAY OF LACTIC ACID: CPT

## 2025-07-17 PROCEDURE — 36415 COLL VENOUS BLD VENIPUNCTURE: CPT

## 2025-07-17 PROCEDURE — P9016 RBC LEUKOCYTES REDUCED: HCPCS

## 2025-07-17 PROCEDURE — 99285 EMERGENCY DEPT VISIT HI MDM: CPT

## 2025-07-17 PROCEDURE — 2500000003 HC RX 250 WO HCPCS: Performed by: NURSE PRACTITIONER

## 2025-07-17 PROCEDURE — 6360000002 HC RX W HCPCS

## 2025-07-17 PROCEDURE — 6360000002 HC RX W HCPCS: Performed by: PHYSICIAN ASSISTANT

## 2025-07-17 PROCEDURE — 85025 COMPLETE CBC W/AUTO DIFF WBC: CPT

## 2025-07-17 PROCEDURE — 86923 COMPATIBILITY TEST ELECTRIC: CPT

## 2025-07-17 PROCEDURE — 84540 ASSAY OF URINE/UREA-N: CPT

## 2025-07-17 PROCEDURE — 83880 ASSAY OF NATRIURETIC PEPTIDE: CPT

## 2025-07-17 PROCEDURE — 71045 X-RAY EXAM CHEST 1 VIEW: CPT

## 2025-07-17 PROCEDURE — 81001 URINALYSIS AUTO W/SCOPE: CPT

## 2025-07-17 PROCEDURE — 1200000000 HC SEMI PRIVATE

## 2025-07-17 PROCEDURE — 86850 RBC ANTIBODY SCREEN: CPT

## 2025-07-17 PROCEDURE — 82962 GLUCOSE BLOOD TEST: CPT

## 2025-07-17 PROCEDURE — 93970 EXTREMITY STUDY: CPT

## 2025-07-17 PROCEDURE — 86901 BLOOD TYPING SEROLOGIC RH(D): CPT

## 2025-07-17 PROCEDURE — 80048 BASIC METABOLIC PNL TOTAL CA: CPT

## 2025-07-17 PROCEDURE — 80053 COMPREHEN METABOLIC PANEL: CPT

## 2025-07-17 PROCEDURE — 82570 ASSAY OF URINE CREATININE: CPT

## 2025-07-17 PROCEDURE — 86900 BLOOD TYPING SEROLOGIC ABO: CPT

## 2025-07-17 PROCEDURE — 83735 ASSAY OF MAGNESIUM: CPT

## 2025-07-17 PROCEDURE — 6360000002 HC RX W HCPCS: Performed by: NURSE PRACTITIONER

## 2025-07-17 RX ORDER — SODIUM CHLORIDE 0.9 % (FLUSH) 0.9 %
5-40 SYRINGE (ML) INJECTION PRN
Status: DISCONTINUED | OUTPATIENT
Start: 2025-07-17 | End: 2025-07-25 | Stop reason: HOSPADM

## 2025-07-17 RX ORDER — METOLAZONE 5 MG/1
5 TABLET ORAL DAILY
Status: DISCONTINUED | OUTPATIENT
Start: 2025-07-17 | End: 2025-07-25

## 2025-07-17 RX ORDER — MIDODRINE HYDROCHLORIDE 5 MG/1
20 TABLET ORAL
Status: DISCONTINUED | OUTPATIENT
Start: 2025-07-17 | End: 2025-07-25 | Stop reason: HOSPADM

## 2025-07-17 RX ORDER — SODIUM CHLORIDE 0.9 % (FLUSH) 0.9 %
5-40 SYRINGE (ML) INJECTION EVERY 12 HOURS SCHEDULED
Status: DISCONTINUED | OUTPATIENT
Start: 2025-07-17 | End: 2025-07-25 | Stop reason: HOSPADM

## 2025-07-17 RX ORDER — ACETAMINOPHEN 325 MG/1
650 TABLET ORAL EVERY 6 HOURS PRN
Status: DISCONTINUED | OUTPATIENT
Start: 2025-07-17 | End: 2025-07-25 | Stop reason: HOSPADM

## 2025-07-17 RX ORDER — SODIUM CHLORIDE 9 MG/ML
INJECTION, SOLUTION INTRAVENOUS PRN
Status: DISCONTINUED | OUTPATIENT
Start: 2025-07-17 | End: 2025-07-25 | Stop reason: HOSPADM

## 2025-07-17 RX ORDER — INSULIN LISPRO 100 [IU]/ML
0-4 INJECTION, SOLUTION INTRAVENOUS; SUBCUTANEOUS
Status: DISCONTINUED | OUTPATIENT
Start: 2025-07-17 | End: 2025-07-25 | Stop reason: HOSPADM

## 2025-07-17 RX ORDER — ALLOPURINOL 100 MG/1
200 TABLET ORAL DAILY
Status: DISCONTINUED | OUTPATIENT
Start: 2025-07-17 | End: 2025-07-25 | Stop reason: HOSPADM

## 2025-07-17 RX ORDER — ONDANSETRON 4 MG/1
4 TABLET, ORALLY DISINTEGRATING ORAL EVERY 8 HOURS PRN
Status: DISCONTINUED | OUTPATIENT
Start: 2025-07-17 | End: 2025-07-25 | Stop reason: HOSPADM

## 2025-07-17 RX ORDER — BUMETANIDE 0.25 MG/ML
2 INJECTION, SOLUTION INTRAMUSCULAR; INTRAVENOUS ONCE
Status: COMPLETED | OUTPATIENT
Start: 2025-07-17 | End: 2025-07-17

## 2025-07-17 RX ORDER — ASPIRIN 81 MG/1
81 TABLET ORAL DAILY
Status: DISCONTINUED | OUTPATIENT
Start: 2025-07-17 | End: 2025-07-21

## 2025-07-17 RX ORDER — POLYETHYLENE GLYCOL 3350 17 G/17G
17 POWDER, FOR SOLUTION ORAL DAILY PRN
Status: DISCONTINUED | OUTPATIENT
Start: 2025-07-17 | End: 2025-07-25 | Stop reason: HOSPADM

## 2025-07-17 RX ORDER — SODIUM CHLORIDE 0.9 % (FLUSH) 0.9 %
5-40 SYRINGE (ML) INJECTION 2 TIMES DAILY
Status: DISCONTINUED | OUTPATIENT
Start: 2025-07-17 | End: 2025-07-18 | Stop reason: HOSPADM

## 2025-07-17 RX ORDER — METOPROLOL SUCCINATE 25 MG/1
12.5 TABLET, EXTENDED RELEASE ORAL DAILY
Status: DISCONTINUED | OUTPATIENT
Start: 2025-07-17 | End: 2025-07-25 | Stop reason: HOSPADM

## 2025-07-17 RX ORDER — ONDANSETRON 2 MG/ML
4 INJECTION INTRAMUSCULAR; INTRAVENOUS EVERY 6 HOURS PRN
Status: DISCONTINUED | OUTPATIENT
Start: 2025-07-17 | End: 2025-07-25 | Stop reason: HOSPADM

## 2025-07-17 RX ORDER — CHOLECALCIFEROL (VITAMIN D3) 50 MCG
2000 TABLET ORAL DAILY
Status: DISCONTINUED | OUTPATIENT
Start: 2025-07-17 | End: 2025-07-25 | Stop reason: HOSPADM

## 2025-07-17 RX ORDER — TAMSULOSIN HYDROCHLORIDE 0.4 MG/1
0.4 CAPSULE ORAL DAILY
Status: DISCONTINUED | OUTPATIENT
Start: 2025-07-17 | End: 2025-07-25 | Stop reason: HOSPADM

## 2025-07-17 RX ORDER — AMMONIUM LACTATE 12 G/100G
LOTION TOPICAL PRN
Status: DISCONTINUED | OUTPATIENT
Start: 2025-07-17 | End: 2025-07-25 | Stop reason: HOSPADM

## 2025-07-17 RX ORDER — ACETAMINOPHEN 650 MG/1
650 SUPPOSITORY RECTAL EVERY 6 HOURS PRN
Status: DISCONTINUED | OUTPATIENT
Start: 2025-07-17 | End: 2025-07-25 | Stop reason: HOSPADM

## 2025-07-17 RX ORDER — FUROSEMIDE 10 MG/ML
20 INJECTION INTRAMUSCULAR; INTRAVENOUS ONCE
Status: DISCONTINUED | OUTPATIENT
Start: 2025-07-17 | End: 2025-07-19

## 2025-07-17 RX ORDER — ATORVASTATIN CALCIUM 40 MG/1
40 TABLET, FILM COATED ORAL DAILY
Status: DISCONTINUED | OUTPATIENT
Start: 2025-07-17 | End: 2025-07-25 | Stop reason: HOSPADM

## 2025-07-17 RX ORDER — POTASSIUM CHLORIDE 1500 MG/1
40 TABLET, EXTENDED RELEASE ORAL ONCE
Status: COMPLETED | OUTPATIENT
Start: 2025-07-17 | End: 2025-07-17

## 2025-07-17 RX ORDER — BUMETANIDE 1 MG/1
2 TABLET ORAL 2 TIMES DAILY
Status: DISCONTINUED | OUTPATIENT
Start: 2025-07-17 | End: 2025-07-23

## 2025-07-17 RX ORDER — DEXTROSE MONOHYDRATE 100 MG/ML
INJECTION, SOLUTION INTRAVENOUS CONTINUOUS PRN
Status: DISCONTINUED | OUTPATIENT
Start: 2025-07-17 | End: 2025-07-25 | Stop reason: HOSPADM

## 2025-07-17 RX ORDER — GLUCAGON 1 MG/ML
1 KIT INJECTION PRN
Status: DISCONTINUED | OUTPATIENT
Start: 2025-07-17 | End: 2025-07-25 | Stop reason: HOSPADM

## 2025-07-17 RX ORDER — BUMETANIDE 0.25 MG/ML
2 INJECTION, SOLUTION INTRAMUSCULAR; INTRAVENOUS 2 TIMES DAILY
Status: DISCONTINUED | OUTPATIENT
Start: 2025-07-17 | End: 2025-07-23

## 2025-07-17 RX ADMIN — ATORVASTATIN CALCIUM 40 MG: 40 TABLET, FILM COATED ORAL at 20:53

## 2025-07-17 RX ADMIN — SODIUM CHLORIDE, PRESERVATIVE FREE 10 ML: 5 INJECTION INTRAVENOUS at 22:06

## 2025-07-17 RX ADMIN — SODIUM CHLORIDE, PRESERVATIVE FREE 40 MG: 5 INJECTION INTRAVENOUS at 22:00

## 2025-07-17 RX ADMIN — TAMSULOSIN HYDROCHLORIDE 0.4 MG: 0.4 CAPSULE ORAL at 20:53

## 2025-07-17 RX ADMIN — BUMETANIDE 2 MG: 0.25 INJECTION INTRAMUSCULAR; INTRAVENOUS at 08:44

## 2025-07-17 RX ADMIN — Medication 2000 UNITS: at 20:53

## 2025-07-17 RX ADMIN — BUMETANIDE 2 MG: 0.25 INJECTION INTRAMUSCULAR; INTRAVENOUS at 23:27

## 2025-07-17 RX ADMIN — POTASSIUM CHLORIDE 40 MEQ: 1500 TABLET, EXTENDED RELEASE ORAL at 16:39

## 2025-07-17 RX ADMIN — MIDODRINE HYDROCHLORIDE 20 MG: 5 TABLET ORAL at 20:54

## 2025-07-17 RX ADMIN — SODIUM CHLORIDE, PRESERVATIVE FREE 10 ML: 5 INJECTION INTRAVENOUS at 08:44

## 2025-07-17 RX ADMIN — SERTRALINE 50 MG: 50 TABLET, FILM COATED ORAL at 20:52

## 2025-07-17 NOTE — ED PROVIDER NOTES
Shared MARY-ED Attending Visit.  CC: Yes-blood products given-35 minutes    ATTENDING PROVIDER ATTESTATION:     Wander Rocha presented to the emergency department for evaluation of Abnormal Lab (Sent from [facility] regarding kidney function labs abnormal)    I have reviewed and discussed the case, including pertinent history (medical, surgical, family and social) and exam findings with the Advanced Practice Provider and the Nurse assigned to Wander Rocha.  I have personally performed and/or participated in the history, exam, medical decision making, and procedures and agree with all pertinent clinical information. I performed a face-to-face evaluation or the patient as well as guided the management and care of this patient.       I have reviewed my findings and recommendations with Wander Rocha and members of family present at the time of disposition.    History of Present Illness:      Wander Rocha is a 70 y.o. male presenting to the ED for abnormal labs, beginning today.  The complaint has been constant, moderate in severity, and worsened by nothing.  Patient states that was seen by [facility] clinic with worsened kidney function labs and was sent to the emergency department because of this.  Patient states that his major complaint is that he is feeling increased weakness and fatigue.  States that he has not had any falls or injuries and is just very tired.  Denies any fevers, chills, chest pain, shortness of breath, abdominal pain, hematuria or dysuria, constipation or diarrhea.      Review of Systems:   A complete review of systems was performed and pertinent positives and negatives are stated within HPI, all other systems reviewed and are negative.    --------------------------------------------- PAST HISTORY ---------------------------------------------  Past Medical History:  has a past medical history of RADHA (acute kidney injury), Atrial fibrillation (HCC), Carpal tunnel syndrome, Colorectal polyps, Diverticula of  needed      docusate (COLACE, DULCOLAX) 100 MG CAPS Take 100 mg by mouth 2 times daily      aspirin 81 MG EC tablet Take 1 tablet by mouth daily      insulin lispro (HUMALOG,ADMELOG) 100 UNIT/ML SOLN injection vial Inject 0-6 Units into the skin 4 times daily (before meals and nightly) Blood sugar 150-200: add 1 Units, 201-250 : Add 2 Units; 251 - 300: Add 3 Units; 301-350: Add 4  Units; 350-400: Add 5 Units; >400: Add 6 Units      epoetin lay (EPOGEN) 64691 UNIT/ML injection From Nephrology: Give 10,000 units of epoetin lay every 2 weeks for 10 months      clobetasol (TEMOVATE) 0.05 % cream Apply topically 2 times daily.  Qty: 60 g, Refills: 1      miconazole (MICOTIN) 2 % powder Apply topically 2 times daily.  Qty: 85 g, Refills: 1      allopurinol (ZYLOPRIM) 100 MG tablet Take 2 tablets by mouth daily  Qty: 180 tablet, Refills: 1    Associated Diagnoses: Chronic gout without tophus, unspecified cause, unspecified site      ammonium lactate (LAC-HYDRIN) 12 % lotion Apply topically as needed to dry skin.  Qty: 225 g, Refills: 1    Associated Diagnoses: Dry skin      Insulin Pen Needle (PEN NEEDLES) 32G X 6 MM MISC Take insulin daily  Qty: 100 each, Refills: 1    Associated Diagnoses: Type 2 diabetes mellitus with hyperglycemia, with long-term current use of insulin ([facility])      ferrous sulfate (IRON 325) 325 (65 Fe) MG tablet Take 1 tablet by mouth every other day  Qty: 45 tablet, Refills: 1    Associated Diagnoses: Anemia, unspecified type             SCREENINGS     [place] Coma Scale  Eye Opening: Spontaneous  Best Verbal Response: Oriented  Best Motor Response: Obeys commands  [place] Coma Scale Score: 15         CIWA Assessment  BP: (!) 104/52  Pulse: 67       PHYSICAL EXAM   Oxygen Saturation Interpretation: Normal on room air analysis.        ED Triage Vitals   BP Systolic BP Percentile Diastolic BP Percentile Temp Temp src Pulse Respirations SpO2   07/17/25 1224 -- -- 07/17/25 1214 -- 07/17/25 1214 07/17/25

## 2025-07-17 NOTE — PLAN OF CARE
Problem: Chronic Conditions and Co-morbidities  Goal: Patient's chronic conditions and co-morbidity symptoms are monitored and maintained or improved  Flowsheets (Taken 7/17/2025 0793)  Care Plan - Patient's Chronic Conditions and Co-Morbidity Symptoms are Monitored and Maintained or Improved: Monitor and assess patient's chronic conditions and comorbid symptoms for stability, deterioration, or improvement

## 2025-07-17 NOTE — PROGRESS NOTES
Congestive Heart Failure Clinic   [facility]       Referring Provider: -  Primary Care Physician: Oly Crespo MD   Cardiologist: Huseyin  Nephrologist: Kali      HISTORY OF PRESENT ILLNESS:     Wander Rocha is a 70 y.o. (1955) male with a history of HFpEF, most recent EF:  Lab Results   Component Value Date    LVEF 50 04/29/2025    LVEFMODE Echo 04/29/2025     He presents to the CHF clinic for ongoing evaluation and monitoring of heart failure.    In the CHF clinic today he denies any adverse symptoms except:  [x] Dizziness or lightheadedness (improved)  [] Syncope or near syncope  [] Recent Fall  [] Shortness of breath at rest   [x] Dyspnea with exertion (3 L O2)  [] Decline in functional capacity (unable to perform activities they had previously been able to do)  [] Fatigue   [] Orthopnea  [] PND  [] Nocturnal cough  [] Hemoptysis  [] Chest pain, pressure, or discomfort  [] Palpitations  [] Abdominal distention  [] Abdominal bloating  [] Early satiety  [] Blood in stool   [] Diarrhea  [] Constipation  [] Nausea/Vomiting  [] Decreased urinary response to oral diuretic   [] Scrotal swelling   [x] Lower extremity edema  [] Used PRN doses of oral diuretic   [] Weight gain       Wt Readings from Last 3 Encounters:   07/17/25 89.8 kg (198 lb)   07/09/25 91.2 kg (201 lb)   07/08/25 92.2 kg (203 lb 3.2 oz)       SOCIAL HISTORY:  [x] Denies tobacco, alcohol or illicit drug abuse  [] Tobacco use:  [] ETOH use:  [] Illicit drug use:        MEDICATIONS:    Allergies   Allergen Reactions    Jardiance [Empagliflozin] Other (See Comments)     Episode of euglycemic DKA 5/2025; no further benefit seen in continuing therapy       Current Outpatient Medications:     levothyroxine (SYNTHROID) 100 MCG tablet, Take 1 tablet by mouth Daily, Disp: , Rfl:     apixaban (ELIQUIS) 5 MG TABS tablet, Take 1 tablet by mouth 2 times daily, Disp: 60 tablet, Rfl: 1    metoprolol succinate (TOPROL XL)

## 2025-07-18 PROBLEM — N18.9 ANEMIA DUE TO CHRONIC KIDNEY DISEASE: Status: ACTIVE | Noted: 2025-07-18

## 2025-07-18 PROBLEM — D63.1 ANEMIA DUE TO CHRONIC KIDNEY DISEASE: Status: ACTIVE | Noted: 2025-07-18

## 2025-07-18 PROBLEM — N18.9 ACUTE KIDNEY INJURY SUPERIMPOSED ON CKD: Status: ACTIVE | Noted: 2025-06-25

## 2025-07-18 LAB
ANION GAP SERPL CALCULATED.3IONS-SCNC: 21 MMOL/L (ref 7–16)
BASOPHILS # BLD: 0 K/UL (ref 0–0.2)
BASOPHILS NFR BLD: 0 % (ref 0–2)
BUN SERPL-MCNC: 87 MG/DL (ref 8–23)
CALCIUM SERPL-MCNC: 8.9 MG/DL (ref 8.8–10.2)
CHLORIDE SERPL-SCNC: 94 MMOL/L (ref 98–107)
CO2 SERPL-SCNC: 22 MMOL/L (ref 22–29)
CREAT SERPL-MCNC: 2.5 MG/DL (ref 0.7–1.2)
EKG ATRIAL RATE: 46 BPM
EKG P-R INTERVAL: 186 MS
EKG Q-T INTERVAL: 530 MS
EKG QRS DURATION: 182 MS
EKG QTC CALCULATION (BAZETT): 533 MS
EKG R AXIS: 113 DEGREES
EKG T AXIS: 62 DEGREES
EKG VENTRICULAR RATE: 61 BPM
EOSINOPHIL # BLD: 0.11 K/UL (ref 0.05–0.5)
EOSINOPHILS RELATIVE PERCENT: 2 % (ref 0–6)
ERYTHROCYTE [DISTWIDTH] IN BLOOD BY AUTOMATED COUNT: 19.5 % (ref 11.5–15)
FERRITIN SERPL-MCNC: 85 NG/ML
GFR, ESTIMATED: 26 ML/MIN/1.73M2
GLUCOSE BLD-MCNC: 123 MG/DL (ref 74–99)
GLUCOSE BLD-MCNC: 134 MG/DL (ref 74–99)
GLUCOSE BLD-MCNC: 136 MG/DL (ref 74–99)
GLUCOSE BLD-MCNC: 172 MG/DL (ref 74–99)
GLUCOSE BLD-MCNC: 193 MG/DL (ref 74–99)
GLUCOSE SERPL-MCNC: 208 MG/DL (ref 74–99)
HCT VFR BLD AUTO: 20.5 % (ref 37–54)
HCT VFR BLD AUTO: 22.1 % (ref 37–54)
HCT VFR BLD AUTO: 23.5 % (ref 37–54)
HGB BLD-MCNC: 6.5 G/DL (ref 12.5–16.5)
HGB BLD-MCNC: 7.3 G/DL (ref 12.5–16.5)
HGB BLD-MCNC: 7.4 G/DL (ref 12.5–16.5)
IMM RETICS NFR: 31.9 % (ref 2.3–13.4)
IRON SATN MFR SERPL: 16 % (ref 20–55)
IRON SERPL-MCNC: 56 UG/DL (ref 61–157)
LYMPHOCYTES NFR BLD: 0.38 K/UL (ref 1.5–4)
LYMPHOCYTES RELATIVE PERCENT: 6 % (ref 20–42)
MAGNESIUM SERPL-MCNC: 2.3 MG/DL (ref 1.6–2.4)
MCH RBC QN AUTO: 30.2 PG (ref 26–35)
MCHC RBC AUTO-ENTMCNC: 31.5 G/DL (ref 32–34.5)
MCV RBC AUTO: 95.9 FL (ref 80–99.9)
MONOCYTES NFR BLD: 0.33 K/UL (ref 0.1–0.95)
MONOCYTES NFR BLD: 5 % (ref 2–12)
NEUTROPHILS NFR BLD: 86 % (ref 43–80)
NEUTS SEG NFR BLD: 5.33 K/UL (ref 1.8–7.3)
NUCLEATED RED BLOOD CELLS: 1 PER 100 WBC
OSMOLALITY UR: 331 MOSM/KG (ref 300–900)
PHOSPHATE SERPL-MCNC: 5.6 MG/DL (ref 2.5–4.5)
PLATELET # BLD AUTO: 220 K/UL (ref 130–450)
PMV BLD AUTO: 9.3 FL (ref 7–12)
POTASSIUM SERPL-SCNC: 3.2 MMOL/L (ref 3.5–5.1)
PROMYELOCYTES ABSOLUTE COUNT: 0.05 K/UL
PROMYELOCYTES: 1 %
RBC # BLD AUTO: 2.45 M/UL (ref 3.8–5.8)
RBC # BLD: ABNORMAL 10*6/UL
RBC # BLD: ABNORMAL 10*6/UL
RETIC HEMOGLOBIN: 32.4 PG (ref 28.2–36.6)
RETICS # AUTO: 0.13 M/UL
RETICS/RBC NFR AUTO: 5.5 % (ref 0.4–1.9)
SODIUM SERPL-SCNC: 136 MMOL/L (ref 136–145)
TIBC SERPL-MCNC: 361 UG/DL (ref 250–450)
WBC OTHER # BLD: 6.2 K/UL (ref 4.5–11.5)

## 2025-07-18 PROCEDURE — 94660 CPAP INITIATION&MGMT: CPT

## 2025-07-18 PROCEDURE — 30233N1 TRANSFUSION OF NONAUTOLOGOUS RED BLOOD CELLS INTO PERIPHERAL VEIN, PERCUTANEOUS APPROACH: ICD-10-PCS | Performed by: CHIROPRACTOR

## 2025-07-18 PROCEDURE — 1200000000 HC SEMI PRIVATE

## 2025-07-18 PROCEDURE — 82728 ASSAY OF FERRITIN: CPT

## 2025-07-18 PROCEDURE — 6360000002 HC RX W HCPCS

## 2025-07-18 PROCEDURE — 83540 ASSAY OF IRON: CPT

## 2025-07-18 PROCEDURE — 36430 TRANSFUSION BLD/BLD COMPNT: CPT

## 2025-07-18 PROCEDURE — 99222 1ST HOSP IP/OBS MODERATE 55: CPT | Performed by: CHIROPRACTOR

## 2025-07-18 PROCEDURE — 84100 ASSAY OF PHOSPHORUS: CPT

## 2025-07-18 PROCEDURE — 85018 HEMOGLOBIN: CPT

## 2025-07-18 PROCEDURE — 83935 ASSAY OF URINE OSMOLALITY: CPT

## 2025-07-18 PROCEDURE — 2580000003 HC RX 258

## 2025-07-18 PROCEDURE — 85014 HEMATOCRIT: CPT

## 2025-07-18 PROCEDURE — 80048 BASIC METABOLIC PNL TOTAL CA: CPT

## 2025-07-18 PROCEDURE — 36415 COLL VENOUS BLD VENIPUNCTURE: CPT

## 2025-07-18 PROCEDURE — 93010 ELECTROCARDIOGRAM REPORT: CPT | Performed by: INTERNAL MEDICINE

## 2025-07-18 PROCEDURE — 82962 GLUCOSE BLOOD TEST: CPT

## 2025-07-18 PROCEDURE — 83735 ASSAY OF MAGNESIUM: CPT

## 2025-07-18 PROCEDURE — 6370000000 HC RX 637 (ALT 250 FOR IP)

## 2025-07-18 PROCEDURE — 83550 IRON BINDING TEST: CPT

## 2025-07-18 PROCEDURE — 85045 AUTOMATED RETICULOCYTE COUNT: CPT

## 2025-07-18 PROCEDURE — 2500000003 HC RX 250 WO HCPCS

## 2025-07-18 PROCEDURE — 85025 COMPLETE CBC W/AUTO DIFF WBC: CPT

## 2025-07-18 PROCEDURE — P9016 RBC LEUKOCYTES REDUCED: HCPCS

## 2025-07-18 RX ORDER — SODIUM CHLORIDE 9 MG/ML
INJECTION, SOLUTION INTRAVENOUS PRN
Status: DISCONTINUED | OUTPATIENT
Start: 2025-07-18 | End: 2025-07-25 | Stop reason: HOSPADM

## 2025-07-18 RX ORDER — SUCRALFATE 1 G/1
1 TABLET ORAL
Status: DISCONTINUED | OUTPATIENT
Start: 2025-07-19 | End: 2025-07-25 | Stop reason: HOSPADM

## 2025-07-18 RX ORDER — SODIUM CHLORIDE 9 MG/ML
INJECTION, SOLUTION INTRAVENOUS CONTINUOUS
Status: DISCONTINUED | OUTPATIENT
Start: 2025-07-18 | End: 2025-07-18

## 2025-07-18 RX ADMIN — SODIUM CHLORIDE, PRESERVATIVE FREE 10 ML: 5 INJECTION INTRAVENOUS at 21:50

## 2025-07-18 RX ADMIN — SODIUM CHLORIDE, PRESERVATIVE FREE 40 MG: 5 INJECTION INTRAVENOUS at 21:50

## 2025-07-18 RX ADMIN — SODIUM CHLORIDE 125 MG: 9 INJECTION, SOLUTION INTRAVENOUS at 15:02

## 2025-07-18 RX ADMIN — SODIUM CHLORIDE, PRESERVATIVE FREE 10 ML: 5 INJECTION INTRAVENOUS at 08:38

## 2025-07-18 RX ADMIN — BUMETANIDE 2 MG: 0.25 INJECTION INTRAMUSCULAR; INTRAVENOUS at 08:34

## 2025-07-18 RX ADMIN — INSULIN LISPRO 1 UNITS: 100 INJECTION, SOLUTION INTRAVENOUS; SUBCUTANEOUS at 17:20

## 2025-07-18 RX ADMIN — LEVOTHYROXINE SODIUM 150 MCG: 0.1 TABLET ORAL at 05:47

## 2025-07-18 RX ADMIN — SODIUM CHLORIDE, PRESERVATIVE FREE 40 MG: 5 INJECTION INTRAVENOUS at 08:34

## 2025-07-18 RX ADMIN — ACETAMINOPHEN 650 MG: 325 TABLET ORAL at 21:57

## 2025-07-18 RX ADMIN — MIDODRINE HYDROCHLORIDE 20 MG: 5 TABLET ORAL at 17:19

## 2025-07-18 ASSESSMENT — PAIN DESCRIPTION - DESCRIPTORS: DESCRIPTORS: ACHING;DISCOMFORT;SORE

## 2025-07-18 ASSESSMENT — PAIN SCALES - GENERAL
PAINLEVEL_OUTOF10: 8
PAINLEVEL_OUTOF10: 8

## 2025-07-18 ASSESSMENT — PAIN SCALES - WONG BAKER: WONGBAKER_NUMERICALRESPONSE: 2;8

## 2025-07-18 ASSESSMENT — PAIN DESCRIPTION - LOCATION: LOCATION: OTHER (COMMENT)

## 2025-07-18 NOTE — ACP (ADVANCE CARE PLANNING)
Advance Care Planning   Healthcare Decision Maker:    Primary Decision Maker: Petra Rocha - Spouse - [phone]    Secondary Decision Maker: RobertoedRubina lundberg - Child - [phone]    Click here to complete Healthcare Decision Makers including selection of the Healthcare Decision Maker Relationship (ie \"Primary\").  Today we documented Decision Maker(s) consistent with Legal Next of Kin hierarchy.       If the relationship to the patient does NOT follow our state's Next of Kin hierarchy, the patient MUST complete an ACP Document to allow him/her to act on the patient's behalf. :

## 2025-07-18 NOTE — PLAN OF CARE
Problem: Chronic Conditions and Co-morbidities  Goal: Patient's chronic conditions and co-morbidity symptoms are monitored and maintained or improved  7/18/2025 0316 by Partha Malagon RN  Outcome: Progressing  7/18/2025 0307 by Partha Malagon RN  Outcome: Progressing     Problem: Skin/Tissue Integrity  Goal: Skin integrity remains intact  Description: 1.  Monitor for areas of redness and/or skin breakdown  2.  Assess vascular access sites hourly  3.  Every 4-6 hours minimum:  Change oxygen saturation probe site  4.  Every 4-6 hours:  If on nasal continuous positive airway pressure, respiratory therapy assess nares and determine need for appliance change or resting period  7/18/2025 0316 by Partha Malagon RN  Outcome: Progressing  7/18/2025 0307 by Partha Malagon RN  Outcome: Progressing     Problem: Safety - Adult  Goal: Free from fall injury  7/18/2025 0316 by Partha Malagon RN  Outcome: Progressing  7/18/2025 0307 by Partha Malagon RN  Outcome: Progressing

## 2025-07-18 NOTE — PLAN OF CARE
Problem: Chronic Conditions and Co-morbidities  Goal: Patient's chronic conditions and co-morbidity symptoms are monitored and maintained or improved  7/18/2025 0928 by Lupis Villanueva RN  Outcome: Progressing  7/18/2025 0316 by Partha Malagon RN  Outcome: Progressing  7/18/2025 0307 by Partha Malagon RN  Outcome: Progressing     Problem: Skin/Tissue Integrity  Goal: Skin integrity remains intact  Description: 1.  Monitor for areas of redness and/or skin breakdown  2.  Assess vascular access sites hourly  3.  Every 4-6 hours minimum:  Change oxygen saturation probe site  4.  Every 4-6 hours:  If on nasal continuous positive airway pressure, respiratory therapy assess nares and determine need for appliance change or resting period  7/18/2025 0928 by Lupis Villanueva RN  Outcome: Progressing  7/18/2025 0316 by Partha Malagon RN  Outcome: Progressing  7/18/2025 0307 by Partha Malagon RN  Outcome: Progressing     Problem: Safety - Adult  Goal: Free from fall injury  7/18/2025 0928 by Lupis Villanueva RN  Outcome: Progressing  7/18/2025 0316 by Partha Malagon RN  Outcome: Progressing  7/18/2025 0307 by Partha Malagon RN  Outcome: Progressing

## 2025-07-18 NOTE — CONSULTS
Department of General Surgery - Adult  Surgical Service GS  Attending Consult Note      Reason for Consult:  Acute GI bleed on chronic  Requesting Physician:  Isacc Dunn MD    CHIEF COMPLAINT:  SOB fatigue abnormal hemogram    History Obtained From:  patient, electronic medical record    HISTORY OF PRESENT ILLNESS:                The patient is a 70 y.o. male who presents with progressive fatigue and increased SOB.    Past Medical History:        Diagnosis Date    RADHA (acute kidney injury) 03/10/2022    Atrial fibrillation (HCC)     Carpal tunnel syndrome     Colorectal polyps 04/15/2013    Diverticula of colon 04/15/2013    Encounter regarding vascular access for dialysis for ESRD (HCC) 03/12/2022    Hyperlipidemia     Hypertension     Hypothyroidism     Lung nodule     Lung nodules 04/15/2013    Nocturnal hypoxemia due to obesity 08/08/2013    Obesity     NIKO (obstructive sleep apnea) 08/08/2013    [facility]    Osteoarthritis     Pacemaker     DOI 10/3/2017 Medtronic; dual chamber; MRI conditional  Indication: Sinus node dysfunction     Pacemaker     DOI 10/3/2017  [employer]tronic; dual chamber; MRI conditional   Indication: Sinus node dysfunction       Pinched nerve     Lumbar    Restrictive lung disease secondary to obesity 07/25/2016    S/P laminectomy with spinal fusion 04/15/2013    Done at [facility], March 2012     Sinus node dysfunction (HCC)     Skin lesion of face 11/16/2022    Type II or unspecified type diabetes mellitus without mention of complication, not stated as uncontrolled      Past Surgical History:        Procedure Laterality Date    BACK SURGERY  2012    [facility]. Pinched nerve in back    BACK SURGERY  05/30/2017    BACK SURGERY N/A 11/8/2022    EXCISON OF SOFT TISSUE NEOPLASM OF UPPER BACK performed by Santana Maurer MD at [facility] [place]    BACK SURGERY Left 1/26/2023    BACK LESION EXCISION SKIN GRAFT performed by Santana Maurer MD at [facility]    COLONOSCOPY  2007

## 2025-07-18 NOTE — CARE COORDINATION
ACTALINA transition of care.  Patient presented to [facility] ER secondary to abnormal lab from CHF Clinic.  Recently discharged from this hospital on 6/28 to home with [facility].  Readmitted inpatient with acute exacerbation of chronic heart failure.  Nephrology and general surgery consulted.  IV Ferrlecit daily, IV Bumex BID, CT chest ordered.  S/p 2 units PRBC.  Hemoglobin 7.3.  Plan for EGD on Monday.  Met with patient at the bedside to discuss discharge planning.   He is from home with his wife.  Reports being independent at home.  His wife assists as needed. He does not drive.  He owns a cane, walker, shower chair, and BSC.  Has home oxygen with HCS @ 3 L NC.  Has been to SNF in past.  Is active with [facility] by [facility] for PT only.  Patient plans to return home at discharge with [facility].  Return referral sent.  Cabrera orders placed.  Patient wife to provide transport and bring portable tank.  Left  message with patient wife to confirm discharge plan.  CM/SW to follow.  Milton Ramos RN  [phone].    Case Management Assessment  Initial Evaluation    Date/Time of Evaluation: 7/18/2025 3:46 PM  Assessment Completed by: Stacie Ramos RN    If patient is discharged prior to next notation, then this note serves as note for discharge by case management.    Patient Name: Wander Rocha                   YOB: 1955  Diagnosis: Hypokalemia [E87.6]  RADHA (acute kidney injury) [N17.9]  Acute on chronic congestive heart failure, unspecified heart failure type (HCC) [I50.9]  Anemia due to chronic kidney disease, unspecified CKD stage [N18.9, D63.1]  Acute exacerbation of chronic heart failure (HCC) [I50.9]                   Date / Time: 7/17/2025 12:26 PM    Patient Admission Status: Inpatient   Readmission Risk (Low < 19, Mod (19-27), High > 27): Readmission Risk Score: 27    Current PCP: Oly Crespo MD  PCP verified by CM? Yes    Chart Reviewed: Yes      History Provided by: Patient  Patient

## 2025-07-18 NOTE — PROGRESS NOTES
4 Eyes Skin Assessment     NAME:  Wander Rocha  YOB: 1955  MEDICAL RECORD NUMBER:  [id number]    The patient is being assessed for  Admission    I agree that at least one RN has performed a thorough Head to Toe Skin Assessment on the patient. ALL assessment sites listed below have been assessed.      Areas assessed by both nurses:    Head, Face, Ears, Shoulders, Back, Chest, Arms, Elbows, Hands, Sacrum. Buttock, Coccyx, Ischium, Legs. Feet and Heels, and Under Medical Devices         Does the Patient have a Wound? No noted wound(s)    Scabs to bilateral great toes, ecchymosis to BUE, left buttocks   Aric Prevention initiated by RN: No  Wound Care Orders initiated by RN: No    For hospital-acquired stage 1 & 2 and ALL Stage 3,4, Unstageable, DTI, NWPT, and Complex wounds: place order “IP Wound Care/Ostomy Nurse Eval and Treat” by RN under : No    New Ostomies, if present place, Ostomy referral order under : No     Nurse 1 eSignature: Electronically signed by Partha Malagon RN on 7/18/25 at 3:18 AM EDT    **SHARE this note so that the co-signing nurse can place an eSignature**    Nurse 2 eSignature: Electronically signed by Iwona Chacon RN on 7/18/25 at 4:39 AM EDT

## 2025-07-18 NOTE — PLAN OF CARE
Digital rectal exam was done with bedside nursing staff as chaperone. Normal anal sphincter tone. Smooth rectal walls. No mass or lesion palpated on rectal vault, no blood per rectum, brown stool on gloved finger.    Electronically signed by Dustin Kramer MD on 7/18/2025 at 7:41 AM

## 2025-07-18 NOTE — PROGRESS NOTES
[facility]  Internal Medicine Residency Program  Progress Note - House Team       Patient:  Wander Rocha 70 y.o. male   MRN: [id number]       Date of Service: 7/18/2025    CC: none  Overnight events: shortness of breath     Subjective     No acute concerns today at bedside. No chest pain, SOB, nausea, vomiting present. He is making urine and has no dyspnea on exertion.       Objective       Physical Exam  Vitals: BP (!) 99/56   Pulse 59   Temp 98.2 °F (36.8 °C) (Temporal)   Resp 17   Ht 1.702 m (5' 7.01\")   Wt 90.7 kg (200 lb)   SpO2 97%   BMI 31.32 kg/m²     I & O - 24hr: No intake/output data recorded.   General Appearance: alert and appears stated age  HEENT:  Head: Normal, normocephalic, atraumatic.  Neck: no JVD and supple, symmetrical, trachea midline  Lung: clear to auscultation bilaterally  Heart: regular rate and rhythm, S1, S2 normal, no murmur, click, rub or gallop  Abdomen: soft, non-tender; bowel sounds normal; no masses,  no organomegaly  Extremities:  edema 1+  Musculokeletal: No joint swelling, no muscle tenderness. ROM normal in all joints of extremities.   Neurologic: Mental status: Alert, oriented, thought content appropriate    Diet:   ADULT DIET; Regular; Low Potassium (Less than 3000 mg/day)      Pertinent Labs & Imaging Studies     Labs    CBC with Differential:    Lab Results   Component Value Date/Time    WBC 5.6 07/17/2025 01:48 PM    RBC 1.97 07/17/2025 01:48 PM    HGB 7.3 07/18/2025 07:21 AM    HCT 22.1 07/18/2025 07:21 AM     07/17/2025 01:48 PM    MCV 96.4 07/17/2025 01:48 PM    MCH 31.0 07/17/2025 01:48 PM    MCHC 32.1 07/17/2025 01:48 PM    RDW 19.7 07/17/2025 01:48 PM    NRBC 1 05/26/2025 04:56 AM    METASPCT 4 05/24/2025 04:45 AM    LYMPHOPCT 10 07/17/2025 01:48 PM    PROMYELOPCT 2 05/21/2025 04:23 AM    MONOPCT 11 07/17/2025 01:48 PM    MYELOPCT 4 07/01/2025 08:48 AM    EOSPCT 6 07/17/2025 01:48 PM    BASOPCT 1 07/17/2025 01:48 PM    MONOSABS

## 2025-07-19 ENCOUNTER — APPOINTMENT (OUTPATIENT)
Dept: CT IMAGING | Age: 70
DRG: 987 | End: 2025-07-19
Payer: MEDICARE

## 2025-07-19 LAB
ABO/RH: NORMAL
ANION GAP SERPL CALCULATED.3IONS-SCNC: 15 MMOL/L (ref 7–16)
ANION GAP SERPL CALCULATED.3IONS-SCNC: 17 MMOL/L (ref 7–16)
ANTIBODY SCREEN: NEGATIVE
ARM BAND NUMBER: NORMAL
BASOPHILS # BLD: 0.05 K/UL (ref 0–0.2)
BASOPHILS # BLD: 0.06 K/UL (ref 0–0.2)
BASOPHILS NFR BLD: 1 % (ref 0–2)
BASOPHILS NFR BLD: 1 % (ref 0–2)
BLOOD BANK BLOOD PRODUCT EXPIRATION DATE: NORMAL
BLOOD BANK BLOOD PRODUCT EXPIRATION DATE: NORMAL
BLOOD BANK DISPENSE STATUS: NORMAL
BLOOD BANK DISPENSE STATUS: NORMAL
BLOOD BANK ISBT PRODUCT BLOOD TYPE: 6200
BLOOD BANK ISBT PRODUCT BLOOD TYPE: 6200
BLOOD BANK PRODUCT CODE: NORMAL
BLOOD BANK PRODUCT CODE: NORMAL
BLOOD BANK SAMPLE EXPIRATION: NORMAL
BLOOD BANK UNIT TYPE AND RH: NORMAL
BLOOD BANK UNIT TYPE AND RH: NORMAL
BPU ID: NORMAL
BPU ID: NORMAL
BUN SERPL-MCNC: 82 MG/DL (ref 8–23)
BUN SERPL-MCNC: 87 MG/DL (ref 8–23)
CALCIUM SERPL-MCNC: 8.8 MG/DL (ref 8.8–10.2)
CALCIUM SERPL-MCNC: 8.9 MG/DL (ref 8.8–10.2)
CHLORIDE SERPL-SCNC: 93 MMOL/L (ref 98–107)
CHLORIDE SERPL-SCNC: 94 MMOL/L (ref 98–107)
CHOLEST SERPL-MCNC: 113 MG/DL
CO2 SERPL-SCNC: 24 MMOL/L (ref 22–29)
CO2 SERPL-SCNC: 25 MMOL/L (ref 22–29)
COMPONENT: NORMAL
COMPONENT: NORMAL
CREAT SERPL-MCNC: 2.7 MG/DL (ref 0.7–1.2)
CREAT SERPL-MCNC: 2.7 MG/DL (ref 0.7–1.2)
CROSSMATCH RESULT: NORMAL
CROSSMATCH RESULT: NORMAL
EOSINOPHIL # BLD: 0.4 K/UL (ref 0.05–0.5)
EOSINOPHIL # BLD: 0.43 K/UL (ref 0.05–0.5)
EOSINOPHILS RELATIVE PERCENT: 6 % (ref 0–6)
EOSINOPHILS RELATIVE PERCENT: 6 % (ref 0–6)
ERYTHROCYTE [DISTWIDTH] IN BLOOD BY AUTOMATED COUNT: 19.4 % (ref 11.5–15)
ERYTHROCYTE [DISTWIDTH] IN BLOOD BY AUTOMATED COUNT: 19.8 % (ref 11.5–15)
GFR, ESTIMATED: 25 ML/MIN/1.73M2
GFR, ESTIMATED: 25 ML/MIN/1.73M2
GLUCOSE BLD-MCNC: 115 MG/DL (ref 74–99)
GLUCOSE BLD-MCNC: 164 MG/DL (ref 74–99)
GLUCOSE BLD-MCNC: 178 MG/DL (ref 74–99)
GLUCOSE BLD-MCNC: 216 MG/DL (ref 74–99)
GLUCOSE SERPL-MCNC: 110 MG/DL (ref 74–99)
GLUCOSE SERPL-MCNC: 145 MG/DL (ref 74–99)
HCT VFR BLD AUTO: 23.1 % (ref 37–54)
HCT VFR BLD AUTO: 24.8 % (ref 37–54)
HDLC SERPL-MCNC: 17 MG/DL
HGB BLD-MCNC: 7.3 G/DL (ref 12.5–16.5)
HGB BLD-MCNC: 8.1 G/DL (ref 12.5–16.5)
IMM GRANULOCYTES # BLD AUTO: 0.2 K/UL (ref 0–0.58)
IMM GRANULOCYTES # BLD AUTO: 0.21 K/UL (ref 0–0.58)
IMM GRANULOCYTES NFR BLD: 3 % (ref 0–5)
IMM GRANULOCYTES NFR BLD: 3 % (ref 0–5)
LDLC SERPL CALC-MCNC: 67 MG/DL
LYMPHOCYTES NFR BLD: 0.69 K/UL (ref 1.5–4)
LYMPHOCYTES NFR BLD: 0.77 K/UL (ref 1.5–4)
LYMPHOCYTES RELATIVE PERCENT: 10 % (ref 20–42)
LYMPHOCYTES RELATIVE PERCENT: 12 % (ref 20–42)
MAGNESIUM SERPL-MCNC: 2.2 MG/DL (ref 1.6–2.4)
MAGNESIUM SERPL-MCNC: 2.3 MG/DL (ref 1.6–2.4)
MCH RBC QN AUTO: 30.2 PG (ref 26–35)
MCH RBC QN AUTO: 31.2 PG (ref 26–35)
MCHC RBC AUTO-ENTMCNC: 31.6 G/DL (ref 32–34.5)
MCHC RBC AUTO-ENTMCNC: 32.7 G/DL (ref 32–34.5)
MCV RBC AUTO: 95.4 FL (ref 80–99.9)
MCV RBC AUTO: 95.5 FL (ref 80–99.9)
MONOCYTES NFR BLD: 0.78 K/UL (ref 0.1–0.95)
MONOCYTES NFR BLD: 0.84 K/UL (ref 0.1–0.95)
MONOCYTES NFR BLD: 11 % (ref 2–12)
MONOCYTES NFR BLD: 13 % (ref 2–12)
NEUTROPHILS NFR BLD: 66 % (ref 43–80)
NEUTROPHILS NFR BLD: 69 % (ref 43–80)
NEUTS SEG NFR BLD: 4.44 K/UL (ref 1.8–7.3)
NEUTS SEG NFR BLD: 4.77 K/UL (ref 1.8–7.3)
PHOSPHATE SERPL-MCNC: 4.8 MG/DL (ref 2.5–4.5)
PHOSPHATE SERPL-MCNC: 5.8 MG/DL (ref 2.5–4.5)
PLATELET # BLD AUTO: 205 K/UL (ref 130–450)
PLATELET # BLD AUTO: 212 K/UL (ref 130–450)
PMV BLD AUTO: 9.2 FL (ref 7–12)
PMV BLD AUTO: 9.2 FL (ref 7–12)
POTASSIUM SERPL-SCNC: 3.4 MMOL/L (ref 3.5–5.1)
POTASSIUM SERPL-SCNC: 3.8 MMOL/L (ref 3.5–5.1)
RBC # BLD AUTO: 2.42 M/UL (ref 3.8–5.8)
RBC # BLD AUTO: 2.6 M/UL (ref 3.8–5.8)
SODIUM SERPL-SCNC: 134 MMOL/L (ref 136–145)
SODIUM SERPL-SCNC: 134 MMOL/L (ref 136–145)
TRANSFUSION STATUS: NORMAL
TRANSFUSION STATUS: NORMAL
TRIGL SERPL-MCNC: 145 MG/DL
UNIT DIVISION: 0
UNIT DIVISION: 0
UNIT ISSUE DATE/TIME: NORMAL
UNIT ISSUE DATE/TIME: NORMAL
VLDLC SERPL CALC-MCNC: 29 MG/DL
WBC OTHER # BLD: 6.7 K/UL (ref 4.5–11.5)
WBC OTHER # BLD: 6.9 K/UL (ref 4.5–11.5)

## 2025-07-19 PROCEDURE — 80061 LIPID PANEL: CPT

## 2025-07-19 PROCEDURE — 2580000003 HC RX 258

## 2025-07-19 PROCEDURE — 99232 SBSQ HOSP IP/OBS MODERATE 35: CPT | Performed by: CHIROPRACTOR

## 2025-07-19 PROCEDURE — 71250 CT THORAX DX C-: CPT

## 2025-07-19 PROCEDURE — 2500000003 HC RX 250 WO HCPCS

## 2025-07-19 PROCEDURE — 6360000002 HC RX W HCPCS

## 2025-07-19 PROCEDURE — 85025 COMPLETE CBC W/AUTO DIFF WBC: CPT

## 2025-07-19 PROCEDURE — 6370000000 HC RX 637 (ALT 250 FOR IP)

## 2025-07-19 PROCEDURE — 82962 GLUCOSE BLOOD TEST: CPT

## 2025-07-19 PROCEDURE — 36415 COLL VENOUS BLD VENIPUNCTURE: CPT

## 2025-07-19 PROCEDURE — 80048 BASIC METABOLIC PNL TOTAL CA: CPT

## 2025-07-19 PROCEDURE — 84100 ASSAY OF PHOSPHORUS: CPT

## 2025-07-19 PROCEDURE — 83735 ASSAY OF MAGNESIUM: CPT

## 2025-07-19 PROCEDURE — 1200000000 HC SEMI PRIVATE

## 2025-07-19 RX ORDER — POTASSIUM CHLORIDE 1500 MG/1
40 TABLET, EXTENDED RELEASE ORAL PRN
Status: DISCONTINUED | OUTPATIENT
Start: 2025-07-19 | End: 2025-07-19

## 2025-07-19 RX ORDER — POTASSIUM CHLORIDE 7.45 MG/ML
10 INJECTION INTRAVENOUS PRN
Status: DISCONTINUED | OUTPATIENT
Start: 2025-07-19 | End: 2025-07-19

## 2025-07-19 RX ORDER — POTASSIUM CHLORIDE 1500 MG/1
40 TABLET, EXTENDED RELEASE ORAL ONCE
Status: COMPLETED | OUTPATIENT
Start: 2025-07-19 | End: 2025-07-19

## 2025-07-19 RX ADMIN — INSULIN LISPRO 2 UNITS: 100 INJECTION, SOLUTION INTRAVENOUS; SUBCUTANEOUS at 12:05

## 2025-07-19 RX ADMIN — ACETAMINOPHEN 650 MG: 325 TABLET ORAL at 20:22

## 2025-07-19 RX ADMIN — SODIUM CHLORIDE 125 MG: 9 INJECTION, SOLUTION INTRAVENOUS at 08:07

## 2025-07-19 RX ADMIN — MIDODRINE HYDROCHLORIDE 20 MG: 5 TABLET ORAL at 12:05

## 2025-07-19 RX ADMIN — Medication: at 06:27

## 2025-07-19 RX ADMIN — SODIUM CHLORIDE, PRESERVATIVE FREE 10 ML: 5 INJECTION INTRAVENOUS at 20:26

## 2025-07-19 RX ADMIN — SODIUM CHLORIDE, PRESERVATIVE FREE 40 MG: 5 INJECTION INTRAVENOUS at 08:05

## 2025-07-19 RX ADMIN — SODIUM CHLORIDE, PRESERVATIVE FREE 10 ML: 5 INJECTION INTRAVENOUS at 08:05

## 2025-07-19 RX ADMIN — POTASSIUM CHLORIDE 40 MEQ: 1500 TABLET, EXTENDED RELEASE ORAL at 12:05

## 2025-07-19 RX ADMIN — SERTRALINE 50 MG: 50 TABLET, FILM COATED ORAL at 08:04

## 2025-07-19 RX ADMIN — TAMSULOSIN HYDROCHLORIDE 0.4 MG: 0.4 CAPSULE ORAL at 08:04

## 2025-07-19 RX ADMIN — SUCRALFATE 1 G: 1 TABLET ORAL at 06:23

## 2025-07-19 RX ADMIN — ATORVASTATIN CALCIUM 40 MG: 40 TABLET, FILM COATED ORAL at 08:05

## 2025-07-19 RX ADMIN — POTASSIUM BICARBONATE 40 MEQ: 782 TABLET, EFFERVESCENT ORAL at 06:53

## 2025-07-19 RX ADMIN — ACETAMINOPHEN 650 MG: 325 TABLET ORAL at 04:21

## 2025-07-19 RX ADMIN — SODIUM CHLORIDE, PRESERVATIVE FREE 40 MG: 5 INJECTION INTRAVENOUS at 20:22

## 2025-07-19 RX ADMIN — MIDODRINE HYDROCHLORIDE 20 MG: 5 TABLET ORAL at 06:23

## 2025-07-19 RX ADMIN — Medication 2000 UNITS: at 08:05

## 2025-07-19 RX ADMIN — LEVOTHYROXINE SODIUM 150 MCG: 0.1 TABLET ORAL at 06:22

## 2025-07-19 RX ADMIN — MIDODRINE HYDROCHLORIDE 20 MG: 5 TABLET ORAL at 16:59

## 2025-07-19 ASSESSMENT — PAIN DESCRIPTION - LOCATION
LOCATION: OTHER (COMMENT);BACK
LOCATION: OTHER (COMMENT)

## 2025-07-19 ASSESSMENT — PAIN SCALES - GENERAL
PAINLEVEL_OUTOF10: 7
PAINLEVEL_OUTOF10: 4
PAINLEVEL_OUTOF10: 9
PAINLEVEL_OUTOF10: 6
PAINLEVEL_OUTOF10: 5
PAINLEVEL_OUTOF10: 4

## 2025-07-19 ASSESSMENT — PAIN SCALES - WONG BAKER
WONGBAKER_NUMERICALRESPONSE: HURTS A LITTLE BIT
WONGBAKER_NUMERICALRESPONSE: 2;8
WONGBAKER_NUMERICALRESPONSE: HURTS A LITTLE BIT
WONGBAKER_NUMERICALRESPONSE: HURTS A LITTLE BIT

## 2025-07-19 ASSESSMENT — PAIN DESCRIPTION - DESCRIPTORS
DESCRIPTORS: ACHING;DISCOMFORT;SORE
DESCRIPTORS: ACHING;DISCOMFORT;SORE

## 2025-07-19 ASSESSMENT — PAIN DESCRIPTION - ORIENTATION: ORIENTATION: LOWER

## 2025-07-19 NOTE — PROGRESS NOTES
[facility]  Internal Medicine Residency Program  Progress Note - House Team 1    Patient:  Wander Rocha 70 y.o. male MRN: [id number]     Date of Service: 7/19/2025     Chief Complaint   Patient presents with    Abnormal Lab     Sent from [facility] regarding kidney function labs abnormal      Overnight events:   >172  K 3.4, replaced    Subjective     Patient seen and evaluated at bedside.  No acute concerns.  SOB and B/L leg swelling improving.  Patient still complains of tarry stool. Denies chest pain, cough, dizziness,  nausea, vomiting and abdominal pain.  Normal appetite , bowel and bladder movement.     Objective     Physical Exam:  Vitals: BP 99/65   Pulse 62   Temp 98.1 °F (36.7 °C) (Temporal)   Resp 18   Ht 1.702 m (5' 7.01\")   Wt 90.7 kg (200 lb)   SpO2 98%   BMI 31.32 kg/m²     I & O - 24hr: I/O this shift:  In: 240 [P.O.:240]  Out: 250 [Urine:250]   General Appearance: alert, appears stated age, and cooperative  HEENT:  Head: Normocephalic, no lesions, without obvious abnormality.  Neck: no adenopathy, no carotid bruit, no JVD, supple, symmetrical, trachea midline, and thyroid not enlarged, symmetric, no tenderness/mass/nodules  Lung: Decreased bilateral breath sounds  Heart: regular rate and rhythm, S1, S2 normal, no murmur, click, rub or gallop  Abdomen: soft, non-tender; bowel sounds normal; no masses,  no organomegaly  Extremities: Bilateral leg swelling, improved since admission  Musculokeletal: No joint swelling, no muscle tenderness. ROM normal in all joints of extremities.   Neurologic: Mental status: Alert, oriented, thought content appropriate  Subject  Pertinent Labs & Imaging Studies   carmela  CBC:   Lab Results   Component Value Date/Time    WBC 6.7 07/19/2025 04:20 AM    RBC 2.42 07/19/2025 04:20 AM    HGB 7.3 07/19/2025 04:20 AM    HCT 23.1 07/19/2025 04:20 AM    MCV 95.5 07/19/2025 04:20 AM    MCH 30.2 07/19/2025 04:20 AM    MCHC 31.6 07/19/2025 04:20 AM    RDW

## 2025-07-19 NOTE — PROGRESS NOTES
Department of General Surgery - Adult  Surgical Service GS  Attending Progress Note      SUBJECTIVE:  moderately SOB    OBJECTIVE  HGB holding with decreased GI blood loss    Physical    VITALS:  BP 99/65   Pulse 62   Temp 98.1 °F (36.7 °C) (Temporal)   Resp 18   Ht 1.702 m (5' 7.01\")   Wt 90.7 kg (200 lb)   SpO2 98%   BMI 31.32 kg/m²   INTAKE/OUTPUT:    Intake/Output Summary (Last 24 hours) at 2025 1018  Last data filed at 2025 0923  Gross per 24 hour   Intake 240 ml   Output 650 ml   Net -410 ml     TEMPERATURE:  Current - Temp: 98.1 °F (36.7 °C); Max - Temp  Av.3 °F (36.8 °C)  Min: 98.1 °F (36.7 °C)  Max: 98.5 °F (36.9 °C)  RESPIRATIONS RANGE: Resp  Av  Min: 18  Max: 18  PULSE RANGE: Pulse  Av.7  Min: 60  Max: 63  BLOOD PRESSURE RANGE:  Systolic (24hrs), Av , Min:99 , Max:100  ; Diastolic (24hrs), Av, Min:50, Max:69   PULSE OXIMETRY RANGE: SpO2  Av.5 %  Min: 97 %  Max: 98 %  CONSTITUTIONAL:  fatigued, alert, cooperative, mild distress, appears older than stated age, and mildly obese  EYES:  lids and lashes normal, pupils equal, round and reactive to light, and extra-ocular muscles intact  NECK:  supple, symmetrical, trachea midline, skin normal, and no stridor  LUNGS:  tachypneic, moderate air exchange, no retractions, and diminished breath sounds right base and left base  CARDIOVASCULAR:  normal apical pulses, regularly irregular rhythm, and normal S1 and S2  ABDOMEN:  normal bowel sounds, soft, distended, non-tender, voluntary guarding absent, and no masses palpated  Data  CBC with Differential:    Lab Results   Component Value Date/Time    WBC 6.7 2025 04:20 AM    RBC 2.42 2025 04:20 AM    HGB 7.3 2025 04:20 AM    HCT 23.1 2025 04:20 AM     2025 04:20 AM    MCV 95.5 2025 04:20 AM    MCH 30.2 2025 04:20 AM    MCHC 31.6 2025 04:20 AM    RDW 19.4 2025 04:20 AM    NRBC 1 2025 05:53 PM    METASPCT 4

## 2025-07-19 NOTE — CONSULTS
Department of Internal Medicine  Nephrology Attending Consult Note      Reason for Consult:  RADHA  Requesting Physician:  Jeanne Tillman MD      CHIEF COMPLAINT: Abnormal lab    History Obtained From:  patient, electronic medical record    HISTORY OF PRESENT ILLNESS:  Briefly, Wander Rocha is a 69-year-old male known to us, past medical history CKD stage 3b probably 2/2 nephrosclerosis and previous episode of ischemic ATN requiring temporary HD in May 2017, recurrent episode of ischemic ATN in March 2022 also requiring HD x2 with fair recovery of renal function, had dialysis again in May 2025 baseline creatinine 1.7 mg/dL, HTN, DM type II, A. fib on apixaban, HFpEF 50-55%, NIKO, pacemaker placement, hypothyroidism, hyperlipidemia, who was admitted on 7/17/2025 for abnormal labs.  Lab work showed creatinine 3.0 mg/dL, proBNP 6163, reason for this consultation.  Chest x-ray showed Cardiomegaly with pulmonary vascular congestion and mild to moderate right  pleural effusion.    Past Medical History:        Diagnosis Date    RADHA (acute kidney injury) 03/10/2022    Atrial fibrillation (HCC)     Carpal tunnel syndrome     Colorectal polyps 04/15/2013    Diverticula of colon 04/15/2013    Encounter regarding vascular access for dialysis for ESRD (HCC) 03/12/2022    Hyperlipidemia     Hypertension     Hypothyroidism     Lung nodule     Lung nodules 04/15/2013    Nocturnal hypoxemia due to obesity 08/08/2013    Obesity     NIKO (obstructive sleep apnea) 08/08/2013    [facility]    Osteoarthritis     Pacemaker     DOI 10/3/2017 Medtronic; dual chamber; MRI conditional  Indication: Sinus node dysfunction     Pacemaker     DOI 10/3/2017  Medtronic; dual chamber; MRI conditional   Indication: Sinus node dysfunction       Pinched nerve     Lumbar    Restrictive lung disease secondary to obesity 07/25/2016    S/P laminectomy with spinal fusion 04/15/2013    Done at [facility], March 2012     Sinus node dysfunction

## 2025-07-20 LAB
ANION GAP SERPL CALCULATED.3IONS-SCNC: 12 MMOL/L (ref 7–16)
ANION GAP SERPL CALCULATED.3IONS-SCNC: 14 MMOL/L (ref 7–16)
BASOPHILS # BLD: 0.06 K/UL (ref 0–0.2)
BASOPHILS # BLD: 0.06 K/UL (ref 0–0.2)
BASOPHILS NFR BLD: 1 % (ref 0–2)
BASOPHILS NFR BLD: 1 % (ref 0–2)
BNP SERPL-MCNC: 5262 PG/ML (ref 0–125)
BUN SERPL-MCNC: 74 MG/DL (ref 8–23)
BUN SERPL-MCNC: 81 MG/DL (ref 8–23)
CALCIUM SERPL-MCNC: 8.7 MG/DL (ref 8.8–10.2)
CALCIUM SERPL-MCNC: 8.8 MG/DL (ref 8.8–10.2)
CHLORIDE SERPL-SCNC: 94 MMOL/L (ref 98–107)
CHLORIDE SERPL-SCNC: 96 MMOL/L (ref 98–107)
CO2 SERPL-SCNC: 25 MMOL/L (ref 22–29)
CO2 SERPL-SCNC: 27 MMOL/L (ref 22–29)
CREAT SERPL-MCNC: 2.5 MG/DL (ref 0.7–1.2)
CREAT SERPL-MCNC: 2.6 MG/DL (ref 0.7–1.2)
EOSINOPHIL # BLD: 0.29 K/UL (ref 0.05–0.5)
EOSINOPHIL # BLD: 0.44 K/UL (ref 0.05–0.5)
EOSINOPHILS RELATIVE PERCENT: 4 % (ref 0–6)
EOSINOPHILS RELATIVE PERCENT: 7 % (ref 0–6)
ERYTHROCYTE [DISTWIDTH] IN BLOOD BY AUTOMATED COUNT: 19.3 % (ref 11.5–15)
ERYTHROCYTE [DISTWIDTH] IN BLOOD BY AUTOMATED COUNT: 19.5 % (ref 11.5–15)
GFR, ESTIMATED: 25 ML/MIN/1.73M2
GFR, ESTIMATED: 27 ML/MIN/1.73M2
GLUCOSE BLD-MCNC: 131 MG/DL (ref 74–99)
GLUCOSE BLD-MCNC: 169 MG/DL (ref 74–99)
GLUCOSE BLD-MCNC: 206 MG/DL (ref 74–99)
GLUCOSE BLD-MCNC: 225 MG/DL (ref 74–99)
GLUCOSE SERPL-MCNC: 136 MG/DL (ref 74–99)
GLUCOSE SERPL-MCNC: 187 MG/DL (ref 74–99)
HCT VFR BLD AUTO: 24.5 % (ref 37–54)
HCT VFR BLD AUTO: 26.6 % (ref 37–54)
HGB BLD-MCNC: 7.7 G/DL (ref 12.5–16.5)
HGB BLD-MCNC: 8.1 G/DL (ref 12.5–16.5)
IMM GRANULOCYTES # BLD AUTO: 0.19 K/UL (ref 0–0.58)
IMM GRANULOCYTES NFR BLD: 3 % (ref 0–5)
LYMPHOCYTES NFR BLD: 0.23 K/UL (ref 1.5–4)
LYMPHOCYTES NFR BLD: 0.61 K/UL (ref 1.5–4)
LYMPHOCYTES RELATIVE PERCENT: 10 % (ref 20–42)
LYMPHOCYTES RELATIVE PERCENT: 3 % (ref 20–42)
MAGNESIUM SERPL-MCNC: 2.1 MG/DL (ref 1.6–2.4)
MAGNESIUM SERPL-MCNC: 2.1 MG/DL (ref 1.6–2.4)
MCH RBC QN AUTO: 30.1 PG (ref 26–35)
MCH RBC QN AUTO: 30.3 PG (ref 26–35)
MCHC RBC AUTO-ENTMCNC: 30.5 G/DL (ref 32–34.5)
MCHC RBC AUTO-ENTMCNC: 31.4 G/DL (ref 32–34.5)
MCV RBC AUTO: 96.5 FL (ref 80–99.9)
MCV RBC AUTO: 98.9 FL (ref 80–99.9)
METAMYELOCYTES ABSOLUTE COUNT: 0.06 K/UL (ref 0–0.12)
METAMYELOCYTES: 1 % (ref 0–1)
MONOCYTES NFR BLD: 0.4 K/UL (ref 0.1–0.95)
MONOCYTES NFR BLD: 0.62 K/UL (ref 0.1–0.95)
MONOCYTES NFR BLD: 10 % (ref 2–12)
MONOCYTES NFR BLD: 6 % (ref 2–12)
NEUTROPHILS NFR BLD: 69 % (ref 43–80)
NEUTROPHILS NFR BLD: 85 % (ref 43–80)
NEUTS SEG NFR BLD: 4.28 K/UL (ref 1.8–7.3)
NEUTS SEG NFR BLD: 5.67 K/UL (ref 1.8–7.3)
PHOSPHATE SERPL-MCNC: 4.2 MG/DL (ref 2.5–4.5)
PHOSPHATE SERPL-MCNC: 4.7 MG/DL (ref 2.5–4.5)
PLATELET # BLD AUTO: 201 K/UL (ref 130–450)
PLATELET # BLD AUTO: 212 K/UL (ref 130–450)
PMV BLD AUTO: 9.1 FL (ref 7–12)
PMV BLD AUTO: 9.5 FL (ref 7–12)
POTASSIUM SERPL-SCNC: 3.9 MMOL/L (ref 3.5–5.1)
POTASSIUM SERPL-SCNC: 4.3 MMOL/L (ref 3.5–5.1)
RBC # BLD AUTO: 2.54 M/UL (ref 3.8–5.8)
RBC # BLD AUTO: 2.69 M/UL (ref 3.8–5.8)
RBC # BLD: ABNORMAL 10*6/UL
SODIUM SERPL-SCNC: 133 MMOL/L (ref 136–145)
SODIUM SERPL-SCNC: 134 MMOL/L (ref 136–145)
WBC OTHER # BLD: 6.2 K/UL (ref 4.5–11.5)
WBC OTHER # BLD: 6.7 K/UL (ref 4.5–11.5)

## 2025-07-20 PROCEDURE — 6370000000 HC RX 637 (ALT 250 FOR IP)

## 2025-07-20 PROCEDURE — 80048 BASIC METABOLIC PNL TOTAL CA: CPT

## 2025-07-20 PROCEDURE — 84100 ASSAY OF PHOSPHORUS: CPT

## 2025-07-20 PROCEDURE — 1200000000 HC SEMI PRIVATE

## 2025-07-20 PROCEDURE — 83880 ASSAY OF NATRIURETIC PEPTIDE: CPT

## 2025-07-20 PROCEDURE — 97161 PT EVAL LOW COMPLEX 20 MIN: CPT

## 2025-07-20 PROCEDURE — 94660 CPAP INITIATION&MGMT: CPT

## 2025-07-20 PROCEDURE — 99232 SBSQ HOSP IP/OBS MODERATE 35: CPT | Performed by: CHIROPRACTOR

## 2025-07-20 PROCEDURE — 82962 GLUCOSE BLOOD TEST: CPT

## 2025-07-20 PROCEDURE — 2580000003 HC RX 258: Performed by: INTERNAL MEDICINE

## 2025-07-20 PROCEDURE — 2500000003 HC RX 250 WO HCPCS

## 2025-07-20 PROCEDURE — 97530 THERAPEUTIC ACTIVITIES: CPT

## 2025-07-20 PROCEDURE — 6360000002 HC RX W HCPCS

## 2025-07-20 PROCEDURE — 36415 COLL VENOUS BLD VENIPUNCTURE: CPT

## 2025-07-20 PROCEDURE — 85025 COMPLETE CBC W/AUTO DIFF WBC: CPT

## 2025-07-20 PROCEDURE — 85045 AUTOMATED RETICULOCYTE COUNT: CPT

## 2025-07-20 PROCEDURE — 83735 ASSAY OF MAGNESIUM: CPT

## 2025-07-20 RX ORDER — SODIUM CHLORIDE 9 MG/ML
INJECTION, SOLUTION INTRAVENOUS CONTINUOUS
Status: DISCONTINUED | OUTPATIENT
Start: 2025-07-20 | End: 2025-07-22

## 2025-07-20 RX ADMIN — SUCRALFATE 1 G: 1 TABLET ORAL at 06:06

## 2025-07-20 RX ADMIN — POTASSIUM BICARBONATE 20 MEQ: 782 TABLET, EFFERVESCENT ORAL at 08:11

## 2025-07-20 RX ADMIN — MIDODRINE HYDROCHLORIDE 20 MG: 5 TABLET ORAL at 08:11

## 2025-07-20 RX ADMIN — INSULIN LISPRO 1 UNITS: 100 INJECTION, SOLUTION INTRAVENOUS; SUBCUTANEOUS at 17:06

## 2025-07-20 RX ADMIN — Medication: at 21:13

## 2025-07-20 RX ADMIN — SERTRALINE 50 MG: 50 TABLET, FILM COATED ORAL at 08:11

## 2025-07-20 RX ADMIN — POTASSIUM BICARBONATE 20 MEQ: 782 TABLET, EFFERVESCENT ORAL at 03:04

## 2025-07-20 RX ADMIN — SODIUM CHLORIDE, PRESERVATIVE FREE 10 ML: 5 INJECTION INTRAVENOUS at 21:05

## 2025-07-20 RX ADMIN — SODIUM CHLORIDE, PRESERVATIVE FREE 40 MG: 5 INJECTION INTRAVENOUS at 08:11

## 2025-07-20 RX ADMIN — SODIUM CHLORIDE: 0.9 INJECTION, SOLUTION INTRAVENOUS at 15:20

## 2025-07-20 RX ADMIN — ATORVASTATIN CALCIUM 40 MG: 40 TABLET, FILM COATED ORAL at 08:11

## 2025-07-20 RX ADMIN — TAMSULOSIN HYDROCHLORIDE 0.4 MG: 0.4 CAPSULE ORAL at 08:14

## 2025-07-20 RX ADMIN — Medication 2000 UNITS: at 08:14

## 2025-07-20 RX ADMIN — SODIUM CHLORIDE, PRESERVATIVE FREE 40 MG: 5 INJECTION INTRAVENOUS at 21:05

## 2025-07-20 RX ADMIN — SODIUM CHLORIDE, PRESERVATIVE FREE 10 ML: 5 INJECTION INTRAVENOUS at 08:14

## 2025-07-20 RX ADMIN — LEVOTHYROXINE SODIUM 150 MCG: 0.1 TABLET ORAL at 06:05

## 2025-07-20 RX ADMIN — MIDODRINE HYDROCHLORIDE 20 MG: 5 TABLET ORAL at 12:12

## 2025-07-20 RX ADMIN — MIDODRINE HYDROCHLORIDE 20 MG: 5 TABLET ORAL at 17:06

## 2025-07-20 RX ADMIN — INSULIN LISPRO 1 UNITS: 100 INJECTION, SOLUTION INTRAVENOUS; SUBCUTANEOUS at 12:12

## 2025-07-20 ASSESSMENT — PAIN SCALES - GENERAL: PAINLEVEL_OUTOF10: 0

## 2025-07-20 NOTE — CONSULTS
Department of Podiatry   Consult Note        Reason for Consult:  Onychomycosis    CHIEF COMPLAINT:  Thick, elongated toenails    HISTORY OF PRESENT ILLNESS:    Wander Rocha is a 70 y.o. male with significant past medical history of A-fib with pacemaker placement, type II diabetes, osteoarthritis, hypothyroidism, and hypertension. Patient states that he usually gets his toenails cut by a podiatrist outpatient, but his podiatrist recently moved away and they have not been trimmed for around 3 months. Patient states that he would like his toenails trimmed today. Patient states that he normally wears compression stockings but has not been able to since he was admitted to hospital. Patient denies any N/V/D/F/C/SOB/CP and has no other pedal complaints at this time.     Past Medical History:        Diagnosis Date    RADHA (acute kidney injury) 03/10/2022    Atrial fibrillation (HCC)     Carpal tunnel syndrome     Colorectal polyps 04/15/2013    Diverticula of colon 04/15/2013    Encounter regarding vascular access for dialysis for ESRD (HCC) 03/12/2022    Hyperlipidemia     Hypertension     Hypothyroidism     Lung nodule     Lung nodules 04/15/2013    Nocturnal hypoxemia due to obesity 08/08/2013    Obesity     NIKO (obstructive sleep apnea) 08/08/2013    [facility]    Osteoarthritis     Pacemaker     DOI 10/3/2017 Medtronic; dual chamber; MRI conditional  Indication: Sinus node dysfunction     Pacemaker     DOI 10/3/2017  Medtronic; dual chamber; MRI conditional   Indication: Sinus node dysfunction       Pinched nerve     Lumbar    Restrictive lung disease secondary to obesity 07/25/2016    S/P laminectomy with spinal fusion 04/15/2013    Done at [facility], March 2012     Sinus node dysfunction (HCC)     Skin lesion of face 11/16/2022    Type II or unspecified type diabetes mellitus without mention of complication, not stated as uncontrolled        Past Surgical History:        Procedure Laterality Date

## 2025-07-20 NOTE — PROGRESS NOTES
07/20/25 1957   NIV Type   NIV Started/Stopped Off  (patient refused NIV; stated he would rather just wear his nasal cannula)

## 2025-07-20 NOTE — PROGRESS NOTES
Department of General Surgery - Adult  Surgical Service GS  Attending Progress Note      SUBJECTIVE:  less SOB    OBJECTIVE  chemistry improved    Physical    VITALS:  /65   Pulse 85   Temp 98 °F (36.7 °C) (Temporal)   Resp 20   Ht 1.702 m (5' 7.01\")   Wt 90.7 kg (200 lb)   SpO2 98%   BMI 31.32 kg/m²   INTAKE/OUTPUT:    Intake/Output Summary (Last 24 hours) at 2025 1053  Last data filed at 2025 1039  Gross per 24 hour   Intake 480 ml   Output 350 ml   Net 130 ml     TEMPERATURE:  Current - Temp: 98 °F (36.7 °C); Max - Temp  Av.9 °F (36.6 °C)  Min: 97 °F (36.1 °C)  Max: 98.2 °F (36.8 °C)  RESPIRATIONS RANGE: Resp  Av.6  Min: 18  Max: 20  PULSE RANGE: Pulse  Av.7  Min: 61  Max: 98  BLOOD PRESSURE RANGE:  Systolic (24hrs), Av , Min:100 , Max:114  ; Diastolic (24hrs), Av, Min:53, Max:76   PULSE OXIMETRY RANGE: SpO2  Av.8 %  Min: 97 %  Max: 99 %  CONSTITUTIONAL:  fatigued, alert, cooperative, no apparent distress, appears older than stated age, and mildly obese  EYES:  lids and lashes normal, pupils equal, round and reactive to light, and extra-ocular muscles intact  NECK:  supple, symmetrical, trachea midline, skin normal, and no stridor  LUNGS:  no increased work of breathing, moderate air exchange, no retractions, and diminished breath sounds right base  CARDIOVASCULAR:  normal apical pulses, regularly irregular rhythm, and normal S1 and S2  ABDOMEN:  normal bowel sounds, soft, distended, non-tender, voluntary guarding absent, and no masses palpated  Data  CBC with Differential:    Lab Results   Component Value Date/Time    WBC 6.2 2025 04:33 AM    RBC 2.54 2025 04:33 AM    HGB 7.7 2025 04:33 AM    HCT 24.5 2025 04:33 AM     2025 04:33 AM    MCV 96.5 2025 04:33 AM    MCH 30.3 2025 04:33 AM    MCHC 31.4 2025 04:33 AM    RDW 19.3 2025 04:33 AM    NRBC 1 2025 05:53 PM    METASPCT 4 2025 04:45 AM     Daily  midodrine (PROAMATINE) tablet 20 mg, 20 mg, Oral, TID WC  [Held by provider] bumetanide (BUMEX) tablet 2 mg, 2 mg, Oral, BID  sodium chloride flush 0.9 % injection 5-40 mL, 5-40 mL, IntraVENous, 2 times per day  sodium chloride flush 0.9 % injection 5-40 mL, 5-40 mL, IntraVENous, PRN  0.9 % sodium chloride infusion, , IntraVENous, PRN  ondansetron (ZOFRAN-ODT) disintegrating tablet 4 mg, 4 mg, Oral, Q8H PRN **OR** ondansetron (ZOFRAN) injection 4 mg, 4 mg, IntraVENous, Q6H PRN  polyethylene glycol (GLYCOLAX) packet 17 g, 17 g, Oral, Daily PRN  acetaminophen (TYLENOL) tablet 650 mg, 650 mg, Oral, Q6H PRN **OR** acetaminophen (TYLENOL) suppository 650 mg, 650 mg, Rectal, Q6H PRN  pantoprazole (PROTONIX) 40 mg in sodium chloride (PF) 0.9 % 10 mL injection, 40 mg, IntraVENous, Q12H  [Held by provider] bumetanide (BUMEX) injection 2 mg, 2 mg, IntraVENous, BID  insulin lispro (HUMALOG,ADMELOG) injection vial 0-4 Units, 0-4 Units, SubCUTAneous, 4x Daily AC & HS  glucose chewable tablet 16 g, 4 tablet, Oral, PRN  dextrose bolus 10% 125 mL, 125 mL, IntraVENous, PRN **OR** dextrose bolus 10% 250 mL, 250 mL, IntraVENous, PRN  glucagon injection 1 mg, 1 mg, SubCUTAneous, PRN  dextrose 10 % infusion, , IntraVENous, Continuous PRN    ASSESSMENT AND PLAN    GI bleed mostly controlled  Renal insufficiency improved  Reviewed plans for AM upper endoscopy

## 2025-07-20 NOTE — PROGRESS NOTES
Department of Internal Medicine  Nephrology Attending Consult Note      Events reviewed    SUBJECTIVE:  We are following for CHF and CKD. He has no complaints today.     PHYSICAL EXAM:      Vitals:    VITALS:  BP (!) 101/57   Pulse 95   Temp 98 °F (36.7 °C) (Temporal)   Resp 20   Ht 1.702 m (5' 7.01\")   Wt 90.7 kg (200 lb)   SpO2 98%   BMI 31.32 kg/m²   24HR INTAKE/OUTPUT:    Intake/Output Summary (Last 24 hours) at 7/20/2025 1257  Last data filed at 7/20/2025 1039  Gross per 24 hour   Intake 480 ml   Output 350 ml   Net 130 ml       Constitutional:  Awake, alert, oriented, in NAD  HEENT:  PERRLA, normocephalic, atraumatic  Respiratory:  diminished  Cardiovascular/Edema:  RRR, normal S1, normal S2 ++ edema   Gastrointestinal:  Soft, flat, non-distended, non-tender  Neurologic:  Nonfocal  Skin:  warm, dry, no rashes, no lesions    Scheduled Meds:   sucralfate  1 g Oral QAM AC    [Held by provider] allopurinol  200 mg Oral Daily    [Held by provider] apixaban  5 mg Oral BID    [Held by provider] aspirin  81 mg Oral Daily    atorvastatin  40 mg Oral Daily    levothyroxine  150 mcg Oral Daily    [Held by provider] metOLazone  5 mg Oral Daily    [Held by provider] metoprolol succinate  12.5 mg Oral Daily    sertraline  50 mg Oral Daily    tamsulosin  0.4 mg Oral Daily    vitamin D  2,000 Units Oral Daily    midodrine  20 mg Oral TID WC    [Held by provider] bumetanide  2 mg Oral BID    sodium chloride flush  5-40 mL IntraVENous 2 times per day    pantoprazole (PROTONIX) 40 mg in sodium chloride (PF) 0.9 % 10 mL injection  40 mg IntraVENous Q12H    [Held by provider] bumetanide  2 mg IntraVENous BID    insulin lispro  0-4 Units SubCUTAneous 4x Daily AC & HS     Continuous Infusions:   sodium chloride      sodium chloride      sodium chloride      dextrose       PRN Meds:.sodium chloride, sodium chloride, ammonium lactate, sodium chloride flush, sodium chloride, ondansetron **OR** ondansetron, polyethylene glycol,  episode of ischemic ATN requiring temporary HD in May 2017, recurrent episode of ischemic ATN in March 2022 also requiring HD x2 with fair recovery of renal function, had dialysis again in May 2025 baseline creatinine 1.7 mg/dL, HTN, DM type II, A. fib on apixaban, HFpEF 50-55%, NIKO, pacemaker placement, hypothyroidism, hyperlipidemia, who was admitted on 7/17/2025 for abnormal labs, he was anemic with a hemoglobin of 6.1.  On admission his was creatinine 3.0 mg/dL, proBNP 6163, reason for this consultation.  Chest x-ray showed Cardiomegaly with pulmonary vascular congestion and mild to moderate right pleural effusion.      IMPRESSION/RECOMMENDATIONS:      RADHA stage II on CKD, probably volume responsive RADHA due to severe anemia and diuretics, creatinine stable at 2.6 mg/dL, to temporary hold diuretics, start NS at 40 cc/hour     CKD stage 3b probably 2/2 nephrosclerosis and previous recurrent episodes of ischemic ATN requiring dialysis,  baseline creatinine 1.7 mg/dL    HFmEF 45% improved 50-55%, proBNP 7517, holding diuretics  Hypokalemia, 2/2 diuretics, to replace   Normocytic anemia, ferritin 112, iron saturation 8%, folate 8.4, B12 687, on epoetin lay  --------------------------------  Persistent atrial fibrillation, on apixaban  Pacemaker insertion 4/23/2025  NIKO  Hypothyroidism, on levothyroxine  Hyperlipidemia, on atorvastatin  Type II DM, on SSI     Plan:     Continue to hold diuretics   Start NS at 50 cc/hour   Continue midodrine 20 mg p.o. 3 times daily  Continue epoetin lay 10,000 units weekly  Continue to monitor renal function  Continue to monitor blood pressure  Continue fluid restriction 1 L        Electronically signed by JACQUELINE THOMSON NP on 7/20/2025 at 12:57 PM     I saw and evaluated the patient, performing the key elements of the service. I discussed the findings, assessment and plan with NP and agree with her findings and plans as documented in her note.        Manoj Chavez,

## 2025-07-20 NOTE — PLAN OF CARE
Problem: Safety - Adult  Goal: Free from fall injury  7/20/2025 1029 by Lady Begum, RN  Outcome: Progressing  7/19/2025 2105 by Christine Velazco, RN  Outcome: Progressing     Problem: Skin/Tissue Integrity  Goal: Skin integrity remains intact  Description: 1.  Monitor for areas of redness and/or skin breakdown  2.  Assess vascular access sites hourly  3.  Every 4-6 hours minimum:  Change oxygen saturation probe site  4.  Every 4-6 hours:  If on nasal continuous positive airway pressure, respiratory therapy assess nares and determine need for appliance change or resting period  7/20/2025 1029 by Lady Begum, RN  Outcome: Progressing  7/19/2025 2105 by Christine Velazco, RN  Outcome: Progressing

## 2025-07-20 NOTE — PROGRESS NOTES
[facility]  Internal Medicine Residency Program  Progress Note - House Team 1    Patient:  Wander Rocha 70 y.o. male MRN: [id number]     Date of Service: 7/20/2025     Chief Complaint   Patient presents with    Abnormal Lab     Sent from [facility] regarding kidney function labs abnormal      Overnight events:   Nephro: Hold diuretics reviewed, continue midodrine, epoetin, with fluid restriction 1 L.  K3.8> replaced  AM labs pending    Subjective     Patient seen and evaluated at bedside.  No acute concerns.  SOB and B/L leg swelling improved.   Denies chest pain, cough, dizziness,  nausea, vomiting and abdominal pain.  Normal appetite , bowel and bladder movement.     Objective     Physical Exam:  Vitals: /65   Pulse 85   Temp 98 °F (36.7 °C) (Temporal)   Resp 20   Ht 1.702 m (5' 7.01\")   Wt 90.7 kg (200 lb)   SpO2 98%   BMI 31.32 kg/m²     I & O - 24hr: I/O this shift:  In: 240 [P.O.:240]  Out: 350 [Urine:350]   General Appearance: alert, appears stated age, and cooperative  HEENT:  Head: Normocephalic, no lesions, without obvious abnormality.  Neck: no adenopathy, no carotid bruit, no JVD, supple, symmetrical, trachea midline, and thyroid not enlarged, symmetric, no tenderness/mass/nodules  Lung: Decreased bilateral breath sounds  Heart: regular rate and rhythm, S1, S2 normal, no murmur, click, rub or gallop  Abdomen: soft, non-tender; bowel sounds normal; no masses,  no organomegaly  Extremities: Bilateral leg swelling improved since admission  Musculokeletal: No joint swelling, no muscle tenderness. ROM normal in all joints of extremities.   Neurologic: Mental status: Alert, oriented, thought content appropriate  Subject  Pertinent Labs & Imaging Studies     CBC:   Lab Results   Component Value Date/Time    WBC 6.2 07/20/2025 04:33 AM    RBC 2.54 07/20/2025 04:33 AM    HGB 7.7 07/20/2025 04:33 AM    HCT 24.5 07/20/2025 04:33 AM    MCV 96.5 07/20/2025 04:33 AM    MCH 30.3 07/20/2025

## 2025-07-20 NOTE — PROGRESS NOTES
Physical Therapy  Physical Therapy Initial Assessment     Name: Wander Rocha  : 1955  MRN: [id number]      Date of Service: 2025    Evaluating PT:  Vik Guzman PT, DPT    Room #:  8408/8408-A  Diagnosis:  Hypokalemia [E87.6]  RADHA (acute kidney injury) [N17.9]  Acute on chronic congestive heart failure, unspecified heart failure type (HCC) [I50.9]  Anemia due to chronic kidney disease, unspecified CKD stage [N18.9, D63.1]  Acute exacerbation of chronic heart failure (HCC) [I50.9]  PMHx:   Past Medical History:   Diagnosis Date    RADHA (acute kidney injury) 03/10/2022    Atrial fibrillation (HCC)     Carpal tunnel syndrome     Colorectal polyps 04/15/2013    Diverticula of colon 04/15/2013    Encounter regarding vascular access for dialysis for ESRD ([facility]) 2022    Hyperlipidemia     Hypertension     Hypothyroidism     Lung nodule     Lung nodules 04/15/2013    Nocturnal hypoxemia due to obesity 2013    Obesity     NIKO (obstructive sleep apnea) 2013    [facility]    Osteoarthritis     Pacemaker     DOI 10/3/2017 [employer]tronic; dual chamber; MRI conditional  Indication: Sinus node dysfunction     Pacemaker     DOI 10/3/2017  [employer]tronic; dual chamber; MRI conditional   Indication: Sinus node dysfunction       Pinched nerve     Lumbar    Restrictive lung disease secondary to obesity 2016    S/P laminectomy with spinal fusion 04/15/2013    Done at [facility], 2012     Sinus node dysfunction (HCC)     Skin lesion of face 2022    Type II or unspecified type diabetes mellitus without mention of complication, not stated as uncontrolled      PSHx:   Past Surgical History:   Procedure Laterality Date    BACK SURGERY      [facility]. Pinched nerve in back    BACK SURGERY  2017    BACK SURGERY N/A 2022    EXCISON OF SOFT TISSUE NEOPLASM OF UPPER BACK performed by Santana Maurer MD at [facility] OR    BACK SURGERY Left 2023    BACK LESION EXCISION

## 2025-07-21 LAB
ANION GAP SERPL CALCULATED.3IONS-SCNC: 12 MMOL/L (ref 7–16)
ANION GAP SERPL CALCULATED.3IONS-SCNC: 14 MMOL/L (ref 7–16)
BUN SERPL-MCNC: 69 MG/DL (ref 8–23)
BUN SERPL-MCNC: 72 MG/DL (ref 8–23)
CALCIUM SERPL-MCNC: 8.7 MG/DL (ref 8.8–10.2)
CALCIUM SERPL-MCNC: 8.9 MG/DL (ref 8.8–10.2)
CHLORIDE SERPL-SCNC: 97 MMOL/L (ref 98–107)
CHLORIDE SERPL-SCNC: 97 MMOL/L (ref 98–107)
CO2 SERPL-SCNC: 25 MMOL/L (ref 22–29)
CO2 SERPL-SCNC: 27 MMOL/L (ref 22–29)
CREAT SERPL-MCNC: 2.2 MG/DL (ref 0.7–1.2)
CREAT SERPL-MCNC: 2.5 MG/DL (ref 0.7–1.2)
GFR, ESTIMATED: 27 ML/MIN/1.73M2
GFR, ESTIMATED: 31 ML/MIN/1.73M2
GLUCOSE BLD-MCNC: 140 MG/DL (ref 74–99)
GLUCOSE BLD-MCNC: 142 MG/DL (ref 74–99)
GLUCOSE BLD-MCNC: 146 MG/DL (ref 74–99)
GLUCOSE BLD-MCNC: 207 MG/DL (ref 74–99)
GLUCOSE SERPL-MCNC: 129 MG/DL (ref 74–99)
GLUCOSE SERPL-MCNC: 173 MG/DL (ref 74–99)
IMM RETICS NFR: 21.7 % (ref 2.3–13.4)
POTASSIUM SERPL-SCNC: 4.3 MMOL/L (ref 3.5–5.1)
POTASSIUM SERPL-SCNC: 4.5 MMOL/L (ref 3.5–5.1)
RETIC HEMOGLOBIN: 30 PG (ref 28.2–36.6)
RETICS # AUTO: 0.14 M/UL
RETICS/RBC NFR AUTO: 5.3 % (ref 0.4–1.9)
SODIUM SERPL-SCNC: 135 MMOL/L (ref 136–145)
SODIUM SERPL-SCNC: 136 MMOL/L (ref 136–145)

## 2025-07-21 PROCEDURE — 2709999900 HC NON-CHARGEABLE SUPPLY: Performed by: SURGERY

## 2025-07-21 PROCEDURE — 1200000000 HC SEMI PRIVATE

## 2025-07-21 PROCEDURE — 3609012400 HC EGD TRANSORAL BIOPSY SINGLE/MULTIPLE: Performed by: SURGERY

## 2025-07-21 PROCEDURE — 97535 SELF CARE MNGMENT TRAINING: CPT

## 2025-07-21 PROCEDURE — 0DB78ZX EXCISION OF STOMACH, PYLORUS, VIA NATURAL OR ARTIFICIAL OPENING ENDOSCOPIC, DIAGNOSTIC: ICD-10-PCS | Performed by: SURGERY

## 2025-07-21 PROCEDURE — 6370000000 HC RX 637 (ALT 250 FOR IP): Performed by: SURGERY

## 2025-07-21 PROCEDURE — 80048 BASIC METABOLIC PNL TOTAL CA: CPT

## 2025-07-21 PROCEDURE — 97166 OT EVAL MOD COMPLEX 45 MIN: CPT

## 2025-07-21 PROCEDURE — 88305 TISSUE EXAM BY PATHOLOGIST: CPT

## 2025-07-21 PROCEDURE — 99232 SBSQ HOSP IP/OBS MODERATE 35: CPT | Performed by: INTERNAL MEDICINE

## 2025-07-21 PROCEDURE — 36415 COLL VENOUS BLD VENIPUNCTURE: CPT

## 2025-07-21 PROCEDURE — 7100000000 HC PACU RECOVERY - FIRST 15 MIN: Performed by: SURGERY

## 2025-07-21 PROCEDURE — 6360000002 HC RX W HCPCS: Performed by: SURGERY

## 2025-07-21 PROCEDURE — 7100000001 HC PACU RECOVERY - ADDTL 15 MIN: Performed by: SURGERY

## 2025-07-21 PROCEDURE — 82962 GLUCOSE BLOOD TEST: CPT

## 2025-07-21 PROCEDURE — 6360000002 HC RX W HCPCS

## 2025-07-21 PROCEDURE — 6370000000 HC RX 637 (ALT 250 FOR IP)

## 2025-07-21 PROCEDURE — 94660 CPAP INITIATION&MGMT: CPT

## 2025-07-21 PROCEDURE — 2500000003 HC RX 250 WO HCPCS

## 2025-07-21 PROCEDURE — 99152 MOD SED SAME PHYS/QHP 5/>YRS: CPT | Performed by: SURGERY

## 2025-07-21 RX ORDER — MIDAZOLAM HYDROCHLORIDE 1 MG/ML
INJECTION, SOLUTION INTRAMUSCULAR; INTRAVENOUS PRN
Status: DISCONTINUED | OUTPATIENT
Start: 2025-07-21 | End: 2025-07-21 | Stop reason: ALTCHOICE

## 2025-07-21 RX ORDER — MEPERIDINE HYDROCHLORIDE 50 MG/ML
INJECTION INTRAMUSCULAR; INTRAVENOUS; SUBCUTANEOUS PRN
Status: DISCONTINUED | OUTPATIENT
Start: 2025-07-21 | End: 2025-07-21 | Stop reason: ALTCHOICE

## 2025-07-21 RX ADMIN — SODIUM CHLORIDE, PRESERVATIVE FREE 10 ML: 5 INJECTION INTRAVENOUS at 11:03

## 2025-07-21 RX ADMIN — SODIUM CHLORIDE, PRESERVATIVE FREE 40 MG: 5 INJECTION INTRAVENOUS at 11:02

## 2025-07-21 RX ADMIN — MIDODRINE HYDROCHLORIDE 20 MG: 5 TABLET ORAL at 11:03

## 2025-07-21 RX ADMIN — SODIUM CHLORIDE, PRESERVATIVE FREE 40 MG: 5 INJECTION INTRAVENOUS at 20:50

## 2025-07-21 RX ADMIN — APIXABAN 2.5 MG: 2.5 TABLET, FILM COATED ORAL at 20:49

## 2025-07-21 RX ADMIN — SERTRALINE 50 MG: 50 TABLET, FILM COATED ORAL at 11:04

## 2025-07-21 RX ADMIN — INSULIN LISPRO 1 UNITS: 100 INJECTION, SOLUTION INTRAVENOUS; SUBCUTANEOUS at 16:57

## 2025-07-21 RX ADMIN — ATORVASTATIN CALCIUM 40 MG: 40 TABLET, FILM COATED ORAL at 11:04

## 2025-07-21 RX ADMIN — Medication 2000 UNITS: at 11:03

## 2025-07-21 RX ADMIN — TAMSULOSIN HYDROCHLORIDE 0.4 MG: 0.4 CAPSULE ORAL at 11:04

## 2025-07-21 RX ADMIN — MIDODRINE HYDROCHLORIDE 20 MG: 5 TABLET ORAL at 16:55

## 2025-07-21 ASSESSMENT — PAIN SCALES - GENERAL: PAINLEVEL_OUTOF10: 0

## 2025-07-21 ASSESSMENT — ENCOUNTER SYMPTOMS
ABDOMINAL PAIN: 0
BLOOD IN STOOL: 0
SHORTNESS OF BREATH: 0

## 2025-07-21 NOTE — PROGRESS NOTES
OCCUPATIONAL THERAPY INITIAL EVALUATION    [facility] [address] [place], [place]       Date:2025                                                  Patient Name: Wander Rocha  MRN: [id number]  : 1955  Room: [address] [place] [address]    Evaluating OT: Tyson Herman OTR/L [id number]    Referring Provider: Mike Whiteside MD      Specific Provider Orders/Date: OT evaluation and treatment 25 1145    Diagnosis:  Hypokalemia [E87.6]  RADHA (acute kidney injury) [N17.9]  Acute on chronic congestive heart failure, unspecified heart failure type (HCC) [I50.9]  Anemia due to chronic kidney disease, unspecified CKD stage [N18.9, D63.1]  Acute exacerbation of chronic heart failure (HCC) [I50.9]      Pertinent Medical History:  has a past medical history of RADHA (acute kidney injury), Atrial fibrillation (HCC), Carpal tunnel syndrome, Colorectal polyps, Diverticula of colon, Encounter regarding vascular access for dialysis for ESRD (HCC), Hyperlipidemia, Hypertension, Hypothyroidism, Lung nodule, Lung nodules, Nocturnal hypoxemia due to obesity, Obesity, NIKO (obstructive sleep apnea), Osteoarthritis, Pacemaker, Pacemaker, Pinched nerve, Restrictive lung disease secondary to obesity, S/P laminectomy with spinal fusion, Sinus node dysfunction (HCC), Skin lesion of face, and Type II or unspecified type diabetes mellitus without mention of complication, not stated as uncontrolled.   Past Surgical History:   Procedure Laterality Date    BACK SURGERY      [facility] [place]. Pinched nerve in back    BACK SURGERY  2017    BACK SURGERY N/A 2022    EXCISON OF SOFT TISSUE NEOPLASM OF UPPER BACK performed by Santana Maurer MD at [facility] OR    BACK SURGERY Left 2023    BACK LESION EXCISION SKIN GRAFT performed by Santana Maurer MD at [facility] OR    COLONOSCOPY      multiple colon polyps    COLONOSCOPY  2012    COLONOSCOPY     COLONOSCOPY  04/26/2022    polyp; diverticula--sarah    COLONOSCOPY N/A 4/26/2022    COLONOSCOPY POLYPECTOMY HOT BIOPSY (Snare) performed by Santana Maurer MD at [facility]    EP DEVICE PROCEDURE N/A 4/17/2025    Venogram performed by Elsie Pereira MD at [facility]    EP DEVICE PROCEDURE N/A 4/23/2025    Biventricular pacemaker upgrade performed by Elsie Pereira MD at [facility]    EYE SURGERY      lennie cataracts implant    HERNIA REPAIR      umbilical    IR TUNNELED CVC PLACE WO SQ PORT/PUMP > 5 YEARS  5/20/2025    IR TUNNELED CVC PLACE WO SQ PORT/PUMP > 5 YEARS 5/20/2025 Rickie, Talya Mayes MD [facility] PROCEDURES    PACEMAKER PLACEMENT  10/03/2017    Medtronic dual chamber    Dr. Vizcarra    ROTATOR CUFF REPAIR Right        Pt presented to the hospital on 7/17 with abnormal labs from CHF clinic     Orders received, chart reviewed, eval complete.     Precautions:  Fall Risk, O2, contact iso     Assessment of current deficits   [x] Functional mobility   [x]ADLs  [x] Strength               []Cognition   [x] Functional transfers   [] IADLs         [x] Safety Awareness   [x]Endurance   [] Fine Motor Coordination [x] Balance [] Vision/perception   []Sensation    [] Gross Motor Coordination [] ROM  [] Delirium                  [] Motor Control     OT PLAN OF CARE   OT POC based on physician orders, patient diagnosis and results of clinical assessment    Frequency/Duration 1-3 days/wk for 2 weeks PRN   Specific OT Treatment to include:   * Instruction/training on adapted ADL techniques and AE recommendations to increase functional independence within precautions       * Training on energy conservation strategies, correct breathing pattern and techniques to improve independence/tolerance for self-care routine  * Functional transfer/mobility training/DME recommendations for increased independence, safety, and fall prevention  * Patient/Family education to increase follow through  life and return to PLOF.       Treatment: OT treatment provided this date includes:  OT edu pt/family on role of OT in the acute care setting. Pt  verbalized understanding   Therapist facilitated and instructed pt on adapted techniques & compensatory strategies to improve safety and independence with ADLs, bed mobility , functional transfers, and functional mobility as noted above to allow pt to achieve highest level of independence and safely. Pt provided min cuing , verbal instructions , extended time , and encouragement  in order to promote maximum level of independence.   Pt edu on use of call light and waiting until staff present to attempt any functional mobility. Pt verbalized understanding and demonstrated ability to locate and press call button.     Rehab Potential: Good  for established goals     Patient / Family Goal: to get better     Patient and/or family were instructed on functional diagnosis, prognosis/goals and OT plan of care. Pt/family demonstrated understanding.      Eval Complexity:      Description  Performance deficits  Clinical decision making  Co-morbidities affecting occupational performance  Modification or assistance to complete eval    Low Complexity   1 to 3 []  Low []  None []  None []   Moderate Complexity   3 to 5 [x]  Mod []  Maybe []  Min to Mod [x]   High Complexity   5 or more []  High [x]  Yes [x]  Max []     The above evaluation is classified as moderate  complexity based off the noted performance deficits, personal factors, co-morbidities, assistance required, and other factors as noted in the clinical evaluation and functional testing.     Time In: 1403  Time Out: 1426  Total Treatment Time: 8 minutes    Min Units   OT Eval Low 13185       OT Eval Moderate 97166  x 1   OT Eval High 05697       OT Re-Eval 66991       Therapeutic Ex 90347       Therapeutic Activities 64269      ADL/Self Care 53417  8 1   Orthotic Management 47022       Neuro Re-Ed 89906       Non-Billable Time

## 2025-07-21 NOTE — PRE SEDATION
Sedation Pre-Procedure Note    Patient Name: Wander Rocha   YOB: 1955  Room/Bed: ENDO POOL ROOM/NONE  Medical Record Number: [id number]  Date: 7/21/2025   Time: 9:34 AM       Indication:  GI bleed    Consent: I have discussed with the patient and/or the patient representative the indication, alternatives, and the possible risks and/or complications of the planned procedure and the anesthesia methods. The patient and/or patient representative appear to understand and agree to proceed.    Vital Signs:   Vitals:    07/21/25 0745   BP: (!) 119/50   Pulse: 66   Resp: 17   Temp: 97.3 °F (36.3 °C)   SpO2: 97%       Past Medical History:   has a past medical history of RADHA (acute kidney injury), Atrial fibrillation (HCC), Carpal tunnel syndrome, Colorectal polyps, Diverticula of colon, Encounter regarding vascular access for dialysis for ESRD (HCC), Hyperlipidemia, Hypertension, Hypothyroidism, Lung nodule, Lung nodules, Nocturnal hypoxemia due to obesity, Obesity, NIKO (obstructive sleep apnea), Osteoarthritis, Pacemaker, Pacemaker, Pinched nerve, Restrictive lung disease secondary to obesity, S/P laminectomy with spinal fusion, Sinus node dysfunction (HCC), Skin lesion of face, and Type II or unspecified type diabetes mellitus without mention of complication, not stated as uncontrolled.    Past Surgical History:   has a past surgical history that includes Colonoscopy (2007); back surgery (2012); eye surgery; hernia repair; Colonoscopy (06/04/2012); back surgery (05/30/2017); pacemaker placement (10/03/2017); Rotator cuff repair (Right); Colonoscopy (04/26/2022); Colonoscopy (N/A, 4/26/2022); back surgery (N/A, 11/8/2022); back surgery (Left, 1/26/2023); ep device procedure (N/A, 4/17/2025); ep device procedure (N/A, 4/23/2025); and IR TUNNELED CVC PLACE WO SQ PORT/PUMP > 5 YEARS (5/20/2025).    Medications:   Scheduled Meds:    sucralfate  1 g Oral QAM AC    [Held by provider] allopurinol  200 mg Oral Daily     MD Oly   ammonium lactate (LAC-HYDRIN) 12 % lotion Apply topically as needed to dry skin.  Patient not taking: Reported on 7/17/2025 1/6/25   Oly Crespo MD   Insulin Pen Needle (PEN NEEDLES) 32G X 6 MM MISC Take insulin daily 9/16/24   Oly Crespo MD   ferrous sulfate (IRON 325) 325 (65 Fe) MG tablet Take 1 tablet by mouth every other day  Patient not taking: Reported on 7/17/2025 5/28/24   Oly Crespo MD     Coumadin Use Last 7 Days:  no  Antiplatelet drug therapy use last 7 days: yes   Other anticoagulant use last 7 days: yes   Additional Medication Information:  none      Pre-Sedation Documentation and Exam:   I have personally completed a history, physical exam & review of systems for this patient (see notes).    Mallampati Airway Assessment:  normal, Mallampati Class III - (soft palate & base of uvula are visible)    Prior History of Anesthesia Complications:   none    ASA Classification:  Class 4 - A patient with an incapacitating systemic disease that is a constant threat to life    Sedation/ Anesthesia Plan:   intravenous sedation    Medications Planned:   midazolam (Versed)  intravenously    Patient is an appropriate candidate for plan of sedation: yes    Electronically signed by Keaton Abrams MD on 7/21/2025 at 9:34 AM

## 2025-07-21 NOTE — PROGRESS NOTES
[facility]  Internal Medicine Residency Program  Progress Note - House Team       Patient:  Wander Rocha 70 y.o. male   MRN: [id number]       Date of Service: 7/21/2025    CC: Anemia  Overnight events:   - Refused NIV   - Episode of NSVT 8 beats, asymptomatic    Subjective     Unable to see patient this morning as patient was in EGD    Objective     Physical Exam  Vitals: /71   Pulse 68   Temp 97.3 °F (36.3 °C) (Oral)   Resp 18   Ht 1.702 m (5' 7.01\")   Wt 90.7 kg (200 lb)   SpO2 98%   BMI 31.32 kg/m²     I & O - 24hr: No intake/output data recorded.     Diet:   ADULT DIET; Regular; Low Potassium (Less than 3000 mg/day); 1000 ml    Pertinent Labs & Imaging Studies     Labs  Recent Labs     07/19/25 1802 07/20/25 0433 07/20/25  1734   WBC 6.9 6.2 6.7   RBC 2.60* 2.54* 2.69*   HGB 8.1* 7.7* 8.1*   HCT 24.8* 24.5* 26.6*   MCV 95.4 96.5 98.9   MCH 31.2 30.3 30.1   MCHC 32.7 31.4* 30.5*   RDW 19.8* 19.3* 19.5*    212 201   MPV 9.2 9.1 9.5     Recent Labs     07/18/25  1753 07/19/25  0420 07/19/25  1802 07/20/25  0433 07/20/25  1734 07/21/25  0816    134* 134* 134* 133* 136   K 3.2* 3.4* 3.8 3.9 4.3 4.3   CL 94* 94* 93* 96* 94* 97*   MG 2.3 2.3 2.2 2.1 2.1  --    PHOS 5.6* 5.8* 4.8* 4.7* 4.2  --    CO2 22 25 24 25 27 25   BUN 87* 87* 82* 81* 74* 72*   CREATININE 2.5* 2.7* 2.7* 2.6* 2.5* 2.2*   ANIONGAP 21* 15 17* 14 12 14   GLUCOSE 208* 145* 110* 136* 187* 129*   CALCIUM 8.9 8.8 8.9 8.7* 8.8 8.9     Recent Labs     07/20/25  0433   PROBNP 5,262*     Imaging Studies:    CT CHEST WO CONTRAST   Final Result   1. Moderate right pleural effusion with passive atelectasis of portions of   the right upper and lower lobes.   2. Trace left effusion.   3. Mild generalized cardiomegaly.   4. Trace perihepatic and perisplenic ascites.   5. Mild hepatosplenomegaly.         XR CHEST PORTABLE   Final Result   Cardiomegaly with pulmonary vascular congestion and mild to moderate right

## 2025-07-21 NOTE — CARE COORDINATION
Plan is home with wife and [facility] when medically ready. Resumption of home health care orders are in. Patient had upper endoscopy for GI Bleed today. Findings: pharmacologically induced gastritis and duodenitis mild pending path report. Hgb was 8.1 yesterday. Continue to hold diuretics and Continue NS at 50 cc/hour per Nephrology. Patient has home oxygen with HCS @ 3 L NC. He is currently on O2 3 liters nasal cannula with O2 sat 98%. Patient's wife will transport him home at time of discharge and will bring a portable O2 tank for the ride home.    Beth Edmondson RN CM  [phone]

## 2025-07-21 NOTE — PROGRESS NOTES
[facility]  Internal Medicine Residency Program  House Team Progress Note      Patient:  Wander Rocha 70 y.o. male MRN: [id number] : 1955   Admitted:  2025 12:26 PM    Date of Service:  2025    Room: [address] [place] [address]-A    Chief complaint: had concerns including Abnormal Lab (Sent from [facility] regarding kidney function labs abnormal).     The patient is a 70 y.o. male, with PMH of CKD stage IV, T2DM on insulin, HFimpEF 50-55%, HTN, HLD, hypothyroidism, NIKO, SND s/p dual-chamber pacemaker (CRT-P) had concerns including Abnormal Lab (Sent from [facility] regarding kidney function labs abnormal)., actively being managed for acute anemia.     Subjective     Interval History:  70 M was being seen in the [facility] for follow up visit when labs showed worsening kidney function (BUN 91, Cr 2.9) and acute anemia (6.1). Presented to ED with SOB for the last 3-4 days which is worse with exertion and progressive swelling of bilateral lower legs for 1 week. Also had diarrhea with dark tarry stools since 1 day before presentation. Hemodynamically stable in the ED. In the ED, given 2 units PRBC, Lasix 20 mg IV, KCl 40 mEq.     Overnight events:  - 8 beats of asymptomatic [facility]    Hospital Day: 5  Today's Course:  Pt complains of bilateral lower leg pain and swelling with associated L knee swelling. Denies shortness of breath, chest pain, abdominal pain.     REVIEW OF SYSTEMS:  Review of Systems   Constitutional:  Negative for chills and fever.   Respiratory:  Negative for shortness of breath (at rest).    Cardiovascular:  Positive for leg swelling. Negative for chest pain and palpitations.   Gastrointestinal:  Negative for abdominal pain and blood in stool.   Neurological:  Negative for dizziness and headaches.     Objective   Vitals: /71   Pulse 68   Temp 97.3 °F (36.3 °C) (Oral)   Resp 18   Ht 1.702 m (5' 7.01\")   Wt 90.7 kg (200 lb)   SpO2 98%   BMI 31.32 kg/m²       PHYSICAL EXAM  General  with pulmonary vascular congestion and mild to moderate right   pleural effusion.         Vascular duplex lower extremity venous bilateral   Final Result   No evidence of DVT in either lower extremity.                Assessment and Plan     Assessment and Plan:  Acute exacerbation of chronic heart failure with preserved EF (4/29/2025 LVEF 50-55%)  Plan  - Nephro consulted  - Not on GDMT due to deranged renal function  - Bumex held due to hypotension  - Monitor BP and renal function    Acute anemia  Hgb 6.1 on presentation, s/p 2 PRBC transfusion --> 8.1 today  EGD negative for active bleeding, showed gastritis and duodenitis  Possibly secondary to worsening renal function  Plan  - Reticulocytes ordered    RADHA on CKD  Creatinine 2.6 --> 2.7 --> 2.5 compared to baseline 1.6-2.4  Plan  - Nephro consulted, holding diuretics  - Monitor electrolytes    Persistent Afib with sinus node disorder s/p dual chamber pacemaker s/p CRT-P  Plan  - Eliquis continued, EGD negative    Non-sustained VTACH  8 beats last nigh5  Plan  - Start metoprolol 12.5 mg QD    T2DM  A1c 7.4 on 5/18/2020  Plan  - Continue insulin regimen    HLD  LDL 67   Plan  - Continue atorvastatin 40 mg QD    Hypothyroidism  5/22 T4 1.0  7/9 TSH 6.69  Plan  - Synthroid 150 mcg QD    Tobacco use per chart:  reports that he quit smoking about 20 years ago. His smoking use included cigarettes. He started smoking about 55 years ago. He has a 3.5 pack-year smoking history. He has been exposed to tobacco smoke. He quit smokeless tobacco use about 21 years ago.  Alcohol use per chart:  reports no history of alcohol use.  DVT prophylaxis: Eliquis and aspirin held due to concern of GI bleed  GI prophylaxis: Protonix  Bowel regimen: Glycolax as needed  Diet:   ADULT DIET; Regular; Low Potassium (Less than 3000 mg/day); 1000 ml   Pain management: as needed  Code status: Full Code   Disposition: Continue Current Care  Family: updated as available    Melody Munoz

## 2025-07-21 NOTE — PLAN OF CARE
Problem: Chronic Conditions and Co-morbidities  Goal: Patient's chronic conditions and co-morbidity symptoms are monitored and maintained or improved  Outcome: Progressing     Problem: Skin/Tissue Integrity  Goal: Skin integrity remains intact  Description: 1.  Monitor for areas of redness and/or skin breakdown  2.  Assess vascular access sites hourly  3.  Every 4-6 hours minimum:  Change oxygen saturation probe site  4.  Every 4-6 hours:  If on nasal continuous positive airway pressure, respiratory therapy assess nares and determine need for appliance change or resting period  7/20/2025 2251 by Sherley Castro RN  Outcome: Progressing     Problem: Safety - Adult  Goal: Free from fall injury  7/20/2025 2251 by Sherley Castro RN  Outcome: Progressing     Problem: Pain  Goal: Verbalizes/displays adequate comfort level or baseline comfort level  Outcome: Progressing     Problem: ABCDS Injury Assessment  Goal: Absence of physical injury  Outcome: Progressing

## 2025-07-21 NOTE — PLAN OF CARE
Problem: Chronic Conditions and Co-morbidities  Goal: Patient's chronic conditions and co-morbidity symptoms are monitored and maintained or improved  7/21/2025 1134 by Amanda Elliott RN  Outcome: Progressing     Problem: Skin/Tissue Integrity  Goal: Skin integrity remains intact  Description: 1.  Monitor for areas of redness and/or skin breakdown  2.  Assess vascular access sites hourly  3.  Every 4-6 hours minimum:  Change oxygen saturation probe site  4.  Every 4-6 hours:  If on nasal continuous positive airway pressure, respiratory therapy assess nares and determine need for appliance change or resting period  7/21/2025 1134 by Amanda Elliott RN  Outcome: Progressing     Problem: Safety - Adult  Goal: Free from fall injury  7/21/2025 1134 by Amanda Elliott RN  Outcome: Progressing     Problem: Pain  Goal: Verbalizes/displays adequate comfort level or baseline comfort level  7/21/2025 1134 by Amanda Elliott RN  Outcome: Progressing     Problem: ABCDS Injury Assessment  Goal: Absence of physical injury  7/21/2025 1134 by Amanda Elliott RN  Outcome: Progressing

## 2025-07-21 NOTE — PROGRESS NOTES
Department of Internal Medicine  Nephrology Attending progress Note      Events reviewed    SUBJECTIVE:  We are following for CHF and CKD. He has no complaints today.     PHYSICAL EXAM:      Vitals:    VITALS:  BP (!) 119/50 Comment: notify the nurse  Pulse 66   Temp 97.3 °F (36.3 °C) (Temporal)   Resp 17   Ht 1.702 m (5' 7.01\")   Wt 90.7 kg (200 lb)   SpO2 97%   BMI 31.32 kg/m²   24HR INTAKE/OUTPUT:    Intake/Output Summary (Last 24 hours) at 7/21/2025 0910  Last data filed at 7/21/2025 0633  Gross per 24 hour   Intake 1202.7 ml   Output 1570 ml   Net -367.3 ml       Constitutional:  Awake, alert, oriented, in NAD  HEENT:  PERRLA, normocephalic, atraumatic  Respiratory:  diminished  Cardiovascular/Edema:  RRR, normal S1, normal S2 ++ edema   Gastrointestinal:  Soft, flat, non-distended, non-tender  Neurologic:  Nonfocal  Skin:  warm, dry, no rashes, no lesions    Scheduled Meds:   sucralfate  1 g Oral QAM AC    [Held by provider] allopurinol  200 mg Oral Daily    [Held by provider] apixaban  5 mg Oral BID    [Held by provider] aspirin  81 mg Oral Daily    atorvastatin  40 mg Oral Daily    levothyroxine  150 mcg Oral Daily    [Held by provider] metOLazone  5 mg Oral Daily    [Held by provider] metoprolol succinate  12.5 mg Oral Daily    sertraline  50 mg Oral Daily    tamsulosin  0.4 mg Oral Daily    vitamin D  2,000 Units Oral Daily    midodrine  20 mg Oral TID WC    [Held by provider] bumetanide  2 mg Oral BID    sodium chloride flush  5-40 mL IntraVENous 2 times per day    pantoprazole (PROTONIX) 40 mg in sodium chloride (PF) 0.9 % 10 mL injection  40 mg IntraVENous Q12H    [Held by provider] bumetanide  2 mg IntraVENous BID    insulin lispro  0-4 Units SubCUTAneous 4x Daily AC & HS     Continuous Infusions:   sodium chloride 50 mL/hr at 07/20/25 1520    sodium chloride      sodium chloride      sodium chloride      dextrose       PRN Meds:.sodium chloride, sodium chloride, ammonium lactate, sodium

## 2025-07-21 NOTE — PROCEDURES
[facility]              [address] [place] [address]                             PROCEDURE NOTE      PATIENT NAME: PAM HEADLEY                : 1955  MED REC NO: [id number]                        ROOM: [id number]  ACCOUNT NO: [id number]                       ADMIT DATE: 2025  PROVIDER: Nettie Abrams MD      DATE OF PROCEDURE:  2025    SURGEON:  Nettie Abrams MD    PREPROCEDURE DIAGNOSIS:  Gastrointestinal bleed with associated anemia.    POSTPROCEDURE DIAGNOSIS:  Gastritis and duodenitis, pharmacologically induced.    PROCEDURES:  Consisted of upper endoscopy and antral biopsies.    DESCRIPTION OF PROCEDURE:  Procedure was carried out under awake sedation.  The patient was left in supine position.  Oropharyngeal area was anesthetized with Cetacaine spray.  The endoscope was introduced and under direct vision, advanced through key portion of the esophagus.  GE junction was patent only, mildly incompetent.  Endoscope was advanced into the stomach.  There were inflammatory changes, mostly located in the antral area consistent with pharmacological induction.  Biopsies were taken.  The endoscope was advanced through the pyloric ring into 1st, 2nd, and 3rd portion of the duodenum.  Bile was carroll.  There were inflammatory changes in the duodenum consistent with duodenitis.  The endoscope was pulled back.  Good hemostasis was verified.  The endoscope was retroflexed, revealing again mild incompetency of the GE junction.  There was no upper pole pathology.  Endoscopy was terminated.  Endoscope was pulled back into the oropharyngeal area.  Vocal cords could not be visualized.  The patient tolerated the procedure well and appropriate recommendations were given.  The patient will be followed and monitored as needed.  Blood loss was minimal.          NETTIE ABRAMS MD      D:  2025 12:02:34     T:  2025 12:54:12     SCE/AQS  Job #:

## 2025-07-21 NOTE — PROCEDURES
Brief Postoperative Note    Wander Rocha  YOB: 1955  [id number]    Pre-operative Diagnosis: GI bleed    Post-operative Diagnosis: Same    Procedure: upper endoscopy    Anesthesia: Moderate Sedation    Surgeons/Assistants: none    Estimated Blood Loss: minimal    Complications: None    Specimens: Was Obtained: antrum    Findings: pharmacologically induced gastritis and duodenitis mild pending path report    Electronically signed by Keaton Abrams MD on 7/21/2025 at 9:36 AM

## 2025-07-22 LAB
ANION GAP SERPL CALCULATED.3IONS-SCNC: 12 MMOL/L (ref 7–16)
BASOPHILS # BLD: 0.07 K/UL (ref 0–0.2)
BASOPHILS NFR BLD: 1 % (ref 0–2)
BUN SERPL-MCNC: 67 MG/DL (ref 8–23)
CALCIUM SERPL-MCNC: 8.6 MG/DL (ref 8.8–10.2)
CHLORIDE SERPL-SCNC: 98 MMOL/L (ref 98–107)
CO2 SERPL-SCNC: 25 MMOL/L (ref 22–29)
CREAT SERPL-MCNC: 2.2 MG/DL (ref 0.7–1.2)
EOSINOPHIL # BLD: 0.57 K/UL (ref 0.05–0.5)
EOSINOPHILS RELATIVE PERCENT: 7 % (ref 0–6)
ERYTHROCYTE [DISTWIDTH] IN BLOOD BY AUTOMATED COUNT: 19 % (ref 11.5–15)
GFR, ESTIMATED: 31 ML/MIN/1.73M2
GLUCOSE BLD-MCNC: 132 MG/DL (ref 74–99)
GLUCOSE BLD-MCNC: 156 MG/DL (ref 74–99)
GLUCOSE BLD-MCNC: 200 MG/DL (ref 74–99)
GLUCOSE BLD-MCNC: 350 MG/DL (ref 74–99)
GLUCOSE SERPL-MCNC: 158 MG/DL (ref 74–99)
HCT VFR BLD AUTO: 25 % (ref 37–54)
HGB BLD-MCNC: 7.5 G/DL (ref 12.5–16.5)
IMM GRANULOCYTES # BLD AUTO: 0.24 K/UL (ref 0–0.58)
IMM GRANULOCYTES NFR BLD: 3 % (ref 0–5)
LYMPHOCYTES NFR BLD: 0.58 K/UL (ref 1.5–4)
LYMPHOCYTES RELATIVE PERCENT: 8 % (ref 20–42)
MCH RBC QN AUTO: 30.5 PG (ref 26–35)
MCHC RBC AUTO-ENTMCNC: 30 G/DL (ref 32–34.5)
MCV RBC AUTO: 101.6 FL (ref 80–99.9)
MONOCYTES NFR BLD: 1.1 K/UL (ref 0.1–0.95)
MONOCYTES NFR BLD: 14 % (ref 2–12)
NEUTROPHILS NFR BLD: 67 % (ref 43–80)
NEUTS SEG NFR BLD: 5.18 K/UL (ref 1.8–7.3)
PLATELET # BLD AUTO: 181 K/UL (ref 130–450)
PMV BLD AUTO: 9.8 FL (ref 7–12)
POTASSIUM SERPL-SCNC: 4.4 MMOL/L (ref 3.5–5.1)
RBC # BLD AUTO: 2.46 M/UL (ref 3.8–5.8)
SODIUM SERPL-SCNC: 135 MMOL/L (ref 136–145)
WBC OTHER # BLD: 7.7 K/UL (ref 4.5–11.5)

## 2025-07-22 PROCEDURE — 94660 CPAP INITIATION&MGMT: CPT

## 2025-07-22 PROCEDURE — 99233 SBSQ HOSP IP/OBS HIGH 50: CPT | Performed by: INTERNAL MEDICINE

## 2025-07-22 PROCEDURE — 6370000000 HC RX 637 (ALT 250 FOR IP)

## 2025-07-22 PROCEDURE — 80048 BASIC METABOLIC PNL TOTAL CA: CPT

## 2025-07-22 PROCEDURE — 2500000003 HC RX 250 WO HCPCS

## 2025-07-22 PROCEDURE — 97530 THERAPEUTIC ACTIVITIES: CPT

## 2025-07-22 PROCEDURE — 82962 GLUCOSE BLOOD TEST: CPT

## 2025-07-22 PROCEDURE — 85025 COMPLETE CBC W/AUTO DIFF WBC: CPT

## 2025-07-22 PROCEDURE — 1200000000 HC SEMI PRIVATE

## 2025-07-22 PROCEDURE — 6370000000 HC RX 637 (ALT 250 FOR IP): Performed by: SURGERY

## 2025-07-22 PROCEDURE — 6360000002 HC RX W HCPCS

## 2025-07-22 PROCEDURE — 97535 SELF CARE MNGMENT TRAINING: CPT

## 2025-07-22 PROCEDURE — 36415 COLL VENOUS BLD VENIPUNCTURE: CPT

## 2025-07-22 RX ORDER — BUMETANIDE 1 MG/1
2 TABLET ORAL 2 TIMES DAILY
Status: DISCONTINUED | OUTPATIENT
Start: 2025-07-22 | End: 2025-07-25 | Stop reason: HOSPADM

## 2025-07-22 RX ADMIN — SODIUM CHLORIDE, PRESERVATIVE FREE 40 MG: 5 INJECTION INTRAVENOUS at 20:30

## 2025-07-22 RX ADMIN — INSULIN LISPRO 4 UNITS: 100 INJECTION, SOLUTION INTRAVENOUS; SUBCUTANEOUS at 17:11

## 2025-07-22 RX ADMIN — INSULIN LISPRO 1 UNITS: 100 INJECTION, SOLUTION INTRAVENOUS; SUBCUTANEOUS at 11:09

## 2025-07-22 RX ADMIN — ATORVASTATIN CALCIUM 40 MG: 40 TABLET, FILM COATED ORAL at 07:51

## 2025-07-22 RX ADMIN — SERTRALINE 50 MG: 50 TABLET, FILM COATED ORAL at 07:50

## 2025-07-22 RX ADMIN — APIXABAN 2.5 MG: 2.5 TABLET, FILM COATED ORAL at 07:51

## 2025-07-22 RX ADMIN — Medication 2000 UNITS: at 07:50

## 2025-07-22 RX ADMIN — SUCRALFATE 1 G: 1 TABLET ORAL at 05:31

## 2025-07-22 RX ADMIN — SODIUM CHLORIDE, PRESERVATIVE FREE 10 ML: 5 INJECTION INTRAVENOUS at 07:51

## 2025-07-22 RX ADMIN — BUMETANIDE 2 MG: 1 TABLET ORAL at 17:11

## 2025-07-22 RX ADMIN — BUMETANIDE 2 MG: 1 TABLET ORAL at 12:46

## 2025-07-22 RX ADMIN — MIDODRINE HYDROCHLORIDE 20 MG: 5 TABLET ORAL at 11:09

## 2025-07-22 RX ADMIN — MIDODRINE HYDROCHLORIDE 20 MG: 5 TABLET ORAL at 07:51

## 2025-07-22 RX ADMIN — SODIUM CHLORIDE, PRESERVATIVE FREE 40 MG: 5 INJECTION INTRAVENOUS at 07:51

## 2025-07-22 RX ADMIN — METOPROLOL SUCCINATE 12.5 MG: 25 TABLET, EXTENDED RELEASE ORAL at 07:51

## 2025-07-22 RX ADMIN — LEVOTHYROXINE SODIUM 150 MCG: 0.1 TABLET ORAL at 05:31

## 2025-07-22 RX ADMIN — MIDODRINE HYDROCHLORIDE 20 MG: 5 TABLET ORAL at 17:11

## 2025-07-22 RX ADMIN — TAMSULOSIN HYDROCHLORIDE 0.4 MG: 0.4 CAPSULE ORAL at 07:50

## 2025-07-22 RX ADMIN — SODIUM CHLORIDE, PRESERVATIVE FREE 10 ML: 5 INJECTION INTRAVENOUS at 20:37

## 2025-07-22 ASSESSMENT — ENCOUNTER SYMPTOMS
SHORTNESS OF BREATH: 0
COLOR CHANGE: 1
BACK PAIN: 0
ABDOMINAL PAIN: 0
COUGH: 0

## 2025-07-22 NOTE — CARE COORDINATION
7/22/25 Update CM Note: EGD 7/21=gastritis/duodenitis/ per Gen Surg. Gi bleed controlled & anemia at baseline/ HGB on 7/20= 8.1/ creat 2.2 today  was 2.5 on 7/20 Nephrology following , IVF 50/h, bumex on hold/ Fluid restriciton 1l/ midodrine / . [facility] accepted/ Resume order in place for physical therapy. Pateint follows with CHF clinic/ Waiting repeat CBC today.  Electronically signed by Inez Peres RN on 7/22/2025 at 11:25 AM

## 2025-07-22 NOTE — PROGRESS NOTES
Department of Internal Medicine  Nephrology Attending progress Note      Events reviewed    SUBJECTIVE:  We are following for CHF and CKD. He has no complaints today.     PHYSICAL EXAM:      Vitals:    VITALS:  BP (!) 120/56   Pulse 70   Temp 98.4 °F (36.9 °C) (Temporal)   Resp 18   Ht 1.702 m (5' 7.01\")   Wt 90.7 kg (200 lb)   SpO2 98%   BMI 31.32 kg/m²   24HR INTAKE/OUTPUT:    Intake/Output Summary (Last 24 hours) at 7/22/2025 0855  Last data filed at 7/22/2025 0823  Gross per 24 hour   Intake 1461 ml   Output 200 ml   Net 1261 ml       Constitutional:  Awake, alert, oriented, in NAD  HEENT:  PERRLA, normocephalic, atraumatic  Respiratory:  diminished  Cardiovascular/Edema:  RRR, normal S1, normal S2 ++ edema   Gastrointestinal:  Soft, flat, non-distended, non-tender  Neurologic:  Nonfocal  Skin:  warm, dry, no rashes, no lesions    Scheduled Meds:   apixaban  2.5 mg Oral BID    sucralfate  1 g Oral QAM AC    [Held by provider] allopurinol  200 mg Oral Daily    atorvastatin  40 mg Oral Daily    levothyroxine  150 mcg Oral Daily    [Held by provider] metOLazone  5 mg Oral Daily    metoprolol succinate  12.5 mg Oral Daily    sertraline  50 mg Oral Daily    tamsulosin  0.4 mg Oral Daily    vitamin D  2,000 Units Oral Daily    midodrine  20 mg Oral TID WC    [Held by provider] bumetanide  2 mg Oral BID    sodium chloride flush  5-40 mL IntraVENous 2 times per day    pantoprazole (PROTONIX) 40 mg in sodium chloride (PF) 0.9 % 10 mL injection  40 mg IntraVENous Q12H    [Held by provider] bumetanide  2 mg IntraVENous BID    insulin lispro  0-4 Units SubCUTAneous 4x Daily AC & HS     Continuous Infusions:   sodium chloride 50 mL/hr at 07/20/25 1520    sodium chloride      sodium chloride      sodium chloride      dextrose       PRN Meds:.sodium chloride, sodium chloride, ammonium lactate, sodium chloride flush, sodium chloride, ondansetron **OR** ondansetron, polyethylene glycol, acetaminophen **OR** acetaminophen,  glucose, dextrose bolus **OR** dextrose bolus, glucagon (rDNA), dextrose      DATA:    CBC:   Lab Results   Component Value Date/Time    WBC 6.7 07/20/2025 05:34 PM    RBC 2.69 07/20/2025 05:34 PM    HGB 8.1 07/20/2025 05:34 PM    HCT 26.6 07/20/2025 05:34 PM    MCV 98.9 07/20/2025 05:34 PM    MCH 30.1 07/20/2025 05:34 PM    MCHC 30.5 07/20/2025 05:34 PM    RDW 19.5 07/20/2025 05:34 PM     07/20/2025 05:34 PM    MPV 9.5 07/20/2025 05:34 PM     CMP:    Lab Results   Component Value Date/Time     07/22/2025 04:22 AM    K 4.4 07/22/2025 04:22 AM    K 5.1 01/08/2023 05:35 AM    CL 98 07/22/2025 04:22 AM    CO2 25 07/22/2025 04:22 AM    BUN 67 07/22/2025 04:22 AM    CREATININE 2.2 07/22/2025 04:22 AM    GFRAA >60 10/03/2022 10:55 AM    LABGLOM 31 07/22/2025 04:22 AM    LABGLOM 60 04/19/2024 10:45 AM    GLUCOSE 158 07/22/2025 04:22 AM    GLUCOSE 133 04/18/2012 08:43 AM    CALCIUM 8.6 07/22/2025 04:22 AM    BILITOT 0.5 07/17/2025 01:48 PM    ALKPHOS 97 07/17/2025 01:48 PM    AST 27 07/17/2025 01:48 PM    ALT 18 07/17/2025 01:48 PM     Magnesium:    Lab Results   Component Value Date/Time    MG 2.1 07/20/2025 05:34 PM     Phosphorus:    Lab Results   Component Value Date/Time    PHOS 4.2 07/20/2025 05:34 PM     Radiology Review:    ONE XRAY VIEW OF THE CHEST     7/17/2025 3:08 pm     COMPARISON:  June 25, 2025     HISTORY:  ORDERING SYSTEM PROVIDED HISTORY: renal failure  TECHNOLOGIST PROVIDED HISTORY:  Reason for exam:->renal failure     FINDINGS:  The heart is enlarged.  Pulmonary vessels are congested.  There is mild to  moderate right pleural effusion.  No pneumothorax.  Pacemaker present.     IMPRESSION:  Cardiomegaly with pulmonary vascular congestion and mild to moderate right  pleural effusion.       BRIEF SUMMARY OF INITIAL CONSULT:    Briefly, Wander Rocha is a 69-year-old male known to us, past medical history CKD stage 3b probably 2/2 nephrosclerosis and previous episode of ischemic ATN requiring

## 2025-07-22 NOTE — PROGRESS NOTES
Department of General Surgery - Adult  Surgical Service GS  Attending Progress Note      SUBJECTIVE:  less SOB    OBJECTIVE  chemistry close to baseline    Physical    VITALS:  BP (!) 120/56   Pulse 70   Temp 98.4 °F (36.9 °C) (Temporal)   Resp 18   Ht 1.702 m (5' 7.01\")   Wt 90.7 kg (200 lb)   SpO2 98%   BMI 31.32 kg/m²   INTAKE/OUTPUT:    Intake/Output Summary (Last 24 hours) at 2025 1036  Last data filed at 2025 0823  Gross per 24 hour   Intake 1461 ml   Output 200 ml   Net 1261 ml     TEMPERATURE:  Current - Temp: 98.4 °F (36.9 °C); Max - Temp  Av.7 °F (36.5 °C)  Min: 97.3 °F (36.3 °C)  Max: 98.4 °F (36.9 °C)  RESPIRATIONS RANGE: Resp  Av  Min: 18  Max: 21  PULSE RANGE: Pulse  Av.8  Min: 62  Max: 70  BLOOD PRESSURE RANGE:  Systolic (24hrs), Av , Min:114 , Max:127  ; Diastolic (24hrs), Av, Min:56, Max:71   PULSE OXIMETRY RANGE: SpO2  Av.3 %  Min: 98 %  Max: 99 %  CONSTITUTIONAL:  fatigued, alert, cooperative, no apparent distress, appears older than stated age, and moderately obese  EYES:  lids and lashes normal, pupils equal, round and reactive to light, and extra-ocular muscles intact  NECK:  supple, symmetrical, trachea midline, skin normal, and no stridor  LUNGS:  Moderate respiratory distress, moderate air exchange, no retractions, and diminished breath sounds right base  CARDIOVASCULAR:  normal apical pulses, regularly irregular rhythm, and normal S1 and S2  ABDOMEN:  hyperactive bowel sounds, soft, non-distended, non-tender, voluntary guarding absent, and no masses palpated  Data  CBC with Differential:    Lab Results   Component Value Date/Time    WBC 6.7 2025 05:34 PM    RBC 2.69 2025 05:34 PM    HGB 8.1 2025 05:34 PM    HCT 26.6 2025 05:34 PM     2025 05:34 PM    MCV 98.9 2025 05:34 PM    MCH 30.1 2025 05:34 PM    MCHC 30.5 2025 05:34 PM    RDW 19.5 2025 05:34 PM    NRBC 1 2025 05:53 PM

## 2025-07-22 NOTE — PLAN OF CARE
Problem: Chronic Conditions and Co-morbidities  Goal: Patient's chronic conditions and co-morbidity symptoms are monitored and maintained or improved  7/22/2025 0824 by Romelia Garcia RN  Outcome: Progressing  7/22/2025 0101 by Sherley Castro RN  Outcome: Progressing     Problem: Skin/Tissue Integrity  Goal: Skin integrity remains intact  Description: 1.  Monitor for areas of redness and/or skin breakdown  2.  Assess vascular access sites hourly  3.  Every 4-6 hours minimum:  Change oxygen saturation probe site  4.  Every 4-6 hours:  If on nasal continuous positive airway pressure, respiratory therapy assess nares and determine need for appliance change or resting period  7/22/2025 0824 by Romelia Garcia RN  Outcome: Progressing  7/22/2025 0101 by Sherley Castro RN  Outcome: Progressing     Problem: Safety - Adult  Goal: Free from fall injury  7/22/2025 0824 by Romelia Garcia RN  Outcome: Progressing  7/22/2025 0101 by Sherley Castro RN  Outcome: Progressing     Problem: Pain  Goal: Verbalizes/displays adequate comfort level or baseline comfort level  7/22/2025 0824 by Romelia Garcia RN  Outcome: Progressing  Flowsheets (Taken 7/22/2025 0815)  Verbalizes/displays adequate comfort level or baseline comfort level: Encourage patient to monitor pain and request assistance  7/22/2025 0101 by Sherley Castro RN  Outcome: Progressing     Problem: ABCDS Injury Assessment  Goal: Absence of physical injury  7/22/2025 0824 by Romelia Garcia RN  Outcome: Progressing  7/22/2025 0101 by Sherley Castro RN  Outcome: Progressing
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
## 2025-07-22 NOTE — PROGRESS NOTES
Spiritual Health History and Assessment/Progress Note  [facility] [place]    (P) Initial Encounter, Spiritual/Emotional Needs,  ,  ,      Name: Wander Rocha MRN: [id number]    Age: 70 y.o.     Sex: male   Language: English   Quaker: Restoration   Acute exacerbation of chronic heart failure (HCC)     Date: 7/22/2025                           Spiritual Assessment began in [facility]        Referral/Consult From: (P) Rounding   Encounter Overview/Reason: (P) Initial Encounter, Spiritual/Emotional Needs  Service Provided For: (P) Patient    Jo Ann, Belief, Meaning:   Patient identifies as spiritual, is connected with a jo ann tradition or spiritual practice, and has beliefs or practices that help with coping during difficult times  Family/Friends No family/friends present      Importance and Influence:  Patient has spiritual/personal beliefs that influence decisions regarding their health  Family/Friends No family/friends present    Community:  Patient is connected with a spiritual community and feels well-supported. Support system includes: Spouse/Partner and Children  Family/Friends No family/friends present    Assessment and Plan of Care:     Patient Interventions include: Facilitated expression of thoughts and feelings, Explored spiritual coping/struggle/distress, Engaged in theological reflection, and Affirmed coping skills/support systems  Family/Friends Interventions include: No family/friends present    Patient Plan of Care: Spiritual Care available upon further referral  Family/Friends Plan of Care: Spiritual Care available upon further referral    Electronically signed by Chaplain Conrad on 7/22/2025 at 11:20 AM

## 2025-07-22 NOTE — PROGRESS NOTES
[facility]  Internal Medicine Residency Program  Progress Note - House Team       Patient:  Wander Rocha 70 y.o. male   MRN: [id number]       Date of Service: 7/22/2025    Chief Complaint: had concerns including Abnormal Lab (Sent from [facility] regarding kidney function labs abnormal).  Subjective     Overnight, no significant events.   Patient was seen and examined bedside, he still complains of worsening B/L lower leg swelling with associated tightness, itching and blistering. He reports one episode of urinary incontinence which has not occurred before. He experienced orthopnea last night. He is on 3L of oxygen, which is his baseline.     He denies any SOB, chest pain, palpitations, cough, or worsening abdominal distension.    Objective   Vitals: BP (!) 120/56   Pulse 70   Temp 98.4 °F (36.9 °C) (Temporal)   Resp 18   Ht 1.702 m (5' 7.01\")   Wt 90.7 kg (200 lb)   SpO2 98%   BMI 31.32 kg/m²  Ideal body weight: 66.1 kg (145 lb 12.2 oz)    I & O - 24hr:    Intake/Output Summary (Last 24 hours) at 7/22/2025 1516  Last data filed at 7/22/2025 1449  Gross per 24 hour   Intake 1641 ml   Output 200 ml   Net 1441 ml     General Appearance: alert, appears stated age, and cooperative  HEENT:  Head: Normocephalic, no lesions, without obvious abnormality.  Eye: Normal external eye, conjunctiva, lids cornea, JELENA.  Ears: Normal TM's bilaterally. Normal auditory canals and external ears. Non-tender.  Nose: Normal external nose, mucus membranes and septum.  Pharynx: Dental Hygiene adequate. Normal buccal mucosa. Normal pharynx.  Lung: clear to auscultation bilaterally  Heart: regular rate and rhythm and S1, S2 normal  Abdomen: soft, non-tender; bowel sounds normal; no masses,  no organomegaly; distension  Extremities:  extremities normal, atraumatic, no cyanosis or edema  Neurologic: Alert, oriented, thought content appropriate    Labs and Imaging Studies     Recent Labs     07/19/25  1802 07/20/25  4989  07/20/25  1734   WBC 6.9 6.2 6.7   HGB 8.1* 7.7* 8.1*   HCT 24.8* 24.5* 26.6*   MCV 95.4 96.5 98.9    212 201     Recent Labs     07/19/25  1802 07/20/25  0433 07/20/25  1734 07/21/25  0816 07/21/25  1546 07/22/25  0422   * 134* 133* 136 135* 135*   K 3.8 3.9 4.3 4.3 4.5 4.4   CL 93* 96* 94* 97* 97* 98   CO2 24 25 27 25 27 25   BUN 82* 81* 74* 72* 69* 67*   CREATININE 2.7* 2.6* 2.5* 2.2* 2.5* 2.2*   MG 2.2 2.1 2.1  --   --   --    PHOS 4.8* 4.7* 4.2  --   --   --      No results for input(s): \"LABALBU\", \"ALT\", \"AST\", \"ALKPHOS\", \"BILITOT\", \"BILIDIR\" in the last 72 hours.  Recent Labs     07/21/25  0816 07/21/25  1546 07/22/25  0422   GLUCOSE 129* 173* 158*       CT CHEST WO CONTRAST   Final Result   1. Moderate right pleural effusion with passive atelectasis of portions of   the right upper and lower lobes.   2. Trace left effusion.   3. Mild generalized cardiomegaly.   4. Trace perihepatic and perisplenic ascites.   5. Mild hepatosplenomegaly.         XR CHEST PORTABLE   Final Result   Cardiomegaly with pulmonary vascular congestion and mild to moderate right   pleural effusion.         Vascular duplex lower extremity venous bilateral   Final Result   No evidence of DVT in either lower extremity.             Assessment and Plan     Wander Rocha is a 70 y.o. male    Assessment:  Acute exacerbation of chronic heart failure with preserved EF (4/29/2025 LVEF 50-55%)    Plan  - Not on GDMT due to deranged renal function  - Bumex BID continued today  - Monitor BP and renal function     Acute anemia  Hgb 6.1 on presentation, s/p 2 PRBC transfusion --> 8.1 yesterday --> CBC pending today  EGD negative for active bleeding, showed gastritis and duodenitis  Possibly secondary to worsening renal function  Reticulocytes 5.3%, marrow responding appropriately   Plan  - Waiting on CBC today, will need to transfuse if patient is less than 8.0     RADHA on CKD  Creatinine 2.6 --> 2.7 --> 2.5 --> 2.5 compared to baseline

## 2025-07-22 NOTE — PLAN OF CARE
Problem: Chronic Conditions and Co-morbidities  Goal: Patient's chronic conditions and co-morbidity symptoms are monitored and maintained or improved  7/22/2025 0101 by Sherley Castro RN  Outcome: Progressing     Problem: Skin/Tissue Integrity  Goal: Skin integrity remains intact  Description: 1.  Monitor for areas of redness and/or skin breakdown  2.  Assess vascular access sites hourly  3.  Every 4-6 hours minimum:  Change oxygen saturation probe site  4.  Every 4-6 hours:  If on nasal continuous positive airway pressure, respiratory therapy assess nares and determine need for appliance change or resting period  7/22/2025 0101 by Sherley Castro RN  Outcome: Progressing     Problem: Safety - Adult  Goal: Free from fall injury  7/22/2025 0101 by Sherley Castro RN  Outcome: Progressing     Problem: Pain  Goal: Verbalizes/displays adequate comfort level or baseline comfort level  7/22/2025 0101 by Sherley Castro RN  Outcome: Progressing     Problem: ABCDS Injury Assessment  Goal: Absence of physical injury  7/22/2025 0101 by Sherley Castro RN  Outcome: Progressing

## 2025-07-22 NOTE — PROGRESS NOTES
Occupational Therapy  OT BEDSIDE TREATMENT NOTE   [facility]  [address] [place], [place]       Date:2025  Patient Name: Wander Rocha  MRN: [id number]  : 1955  Room: [address] [place] [address]A     Per OT Eval:    Evaluating OT: Tyson Herman OTR/L [id number]     Referring Provider: Mike Whiteside MD                                   Specific Provider Orders/Date: OT evaluation and treatment 25 1145     Diagnosis:  Hypokalemia [E87.6]  RADHA (acute kidney injury) [N17.9]  Acute on chronic congestive heart failure, unspecified heart failure type (HCC) [I50.9]  Anemia due to chronic kidney disease, unspecified CKD stage [N18.9, D63.1]  Acute exacerbation of chronic heart failure (HCC) [I50.9]       Pertinent Medical History:  has a past medical history of RADHA (acute kidney injury), Atrial fibrillation (HCC), Carpal tunnel syndrome, Colorectal polyps, Diverticula of colon, Encounter regarding vascular access for dialysis for ESRD (HCC), Hyperlipidemia, Hypertension, Hypothyroidism, Lung nodule, Lung nodules, Nocturnal hypoxemia due to obesity, Obesity, NIKO (obstructive sleep apnea), Osteoarthritis, Pacemaker, Pacemaker, Pinched nerve, Restrictive lung disease secondary to obesity, S/P laminectomy with spinal fusion, Sinus node dysfunction (HCC), Skin lesion of face, and Type II or unspecified type diabetes mellitus without mention of complication, not stated as uncontrolled.   Past Surgical History         Past Surgical History:   Procedure Laterality Date    BACK SURGERY        [facility]. Pinched nerve in back    BACK SURGERY   2017    BACK SURGERY N/A 2022     EXCISON OF SOFT TISSUE NEOPLASM OF UPPER BACK performed by Santana Maurer MD at [facility]    BACK SURGERY Left 2023     BACK LESION EXCISION SKIN GRAFT performed by Santana Maurer MD at [facility]    COLONOSCOPY        multiple colon polyps     task  For energy conservation techniques  Mod I    UB Dressing Minimal assistance  Gown 2/2 line management  Min A   Managing gown  Mod I    LB Dressing Minimal assistance  Simulated   Mod A  Limited functional reach  Mod I    Bathing Minimal assistance  Simulated  Mod A  Discussed home set up  Recommending ext tub transfer bench  Mod I    Toileting Minimal assistance  Simulated   Min A  Simulated  Mod I    Bed Mobility  Rolling L/R: NT   Supine to sit: NT   Sit to supine: NT   NT  Pt sleeps in chair at home  Supine to sit: Mod I   Sit to supine: Mod I    Functional Transfers Sit to stand: Stand by assist    Stand to sit: Min A  for safe de-acceleration to and from chair with arms   Stand pivot: NT   SBA  Sit < > Stand  Stand pivot with walker  Mod I    Functional Mobility Stand by assist  With no AD  to and from the restroom and A/cues to manage O2 line   SBA  No AD, walking in room short distances, standing rest periods due to mild SOB Mod I   with AAD    Balance Sitting - static: Stand by assist    Sitting - dynamic: Stand by assist  during functional activity      Standing- static: Stand by assist    Standing- dynamic: Stand by assist  during functional activity   Sitting: SBA  Standing: SBA   Sitting: Mod I       Standing: Mod I    Activity Tolerance Fair  Pt on 3L o2 via nc throughout session (baseline 3 L)   Fair  Chronic 3L O2  97% throughout  Good   Visual/  Perceptual no glasses present      perception: WFL              BUE  ROM/Strength/  Fine motor Coordination Hand dominance: right      RUE: ROM WFL      Strength: grossly functional      Strength: WFL      Coordination:  WFL      LUE: ROM WFL      Strength: grossly functional      Strength: WFL      Coordination: WFL        Safety   Fair +  Fair+ Good during functional activity      Education:  Pt was educated through out treatment regarding proper technique & safety with bed mobility, functional transfers & mobility, importance of energy

## 2025-07-22 NOTE — PROGRESS NOTES
Physical Therapy  Physical Therapy Treatment Note     Name: Wander Rocha  : 1955  MRN: [id number]      Date of Service: 2025    Evaluating PT:  Vik Guzman PT, DPT    Room #:  8408/8408-A  Diagnosis:  Hypokalemia [E87.6]  RADHA (acute kidney injury) [N17.9]  Acute on chronic congestive heart failure, unspecified heart failure type (HCC) [I50.9]  Anemia due to chronic kidney disease, unspecified CKD stage [N18.9, D63.1]  Acute exacerbation of chronic heart failure (HCC) [I50.9]  PMHx:   Past Medical History:   Diagnosis Date    RADHA (acute kidney injury) 03/10/2022    Atrial fibrillation (HCC)     Carpal tunnel syndrome     Colorectal polyps 04/15/2013    Diverticula of colon 04/15/2013    Encounter regarding vascular access for dialysis for ESRD (HCC) 2022    Hyperlipidemia     Hypertension     Hypothyroidism     Lung nodule     Lung nodules 04/15/2013    Nocturnal hypoxemia due to obesity 2013    Obesity     NIKO (obstructive sleep apnea) 2013    [facility]    Osteoarthritis     Pacemaker     DOI 10/3/2017 [employer]; dual chamber; MRI conditional  Indication: Sinus node dysfunction     Pacemaker     DOI 10/3/2017  [employer]; dual chamber; MRI conditional   Indication: Sinus node dysfunction       Pinched nerve     Lumbar    Restrictive lung disease secondary to obesity 2016    S/P laminectomy with spinal fusion 04/15/2013    Done at [facility], 2012     Sinus node dysfunction (HCC)     Skin lesion of face 2022    Type II or unspecified type diabetes mellitus without mention of complication, not stated as uncontrolled      PSHx:   Past Surgical History:   Procedure Laterality Date    BACK SURGERY      [facility]. Pinched nerve in back    BACK SURGERY  2017    BACK SURGERY N/A 2022    EXCISON OF SOFT TISSUE NEOPLASM OF UPPER BACK performed by Santana Maurer MD at [facility]    BACK SURGERY Left 2023    BACK LESION EXCISION SKIN

## 2025-07-23 LAB
ANION GAP SERPL CALCULATED.3IONS-SCNC: 13 MMOL/L (ref 7–16)
BASOPHILS # BLD: 0.09 K/UL (ref 0–0.2)
BASOPHILS NFR BLD: 1 % (ref 0–2)
BUN SERPL-MCNC: 65 MG/DL (ref 8–23)
CALCIUM SERPL-MCNC: 8.7 MG/DL (ref 8.8–10.2)
CHLORIDE SERPL-SCNC: 95 MMOL/L (ref 98–107)
CO2 SERPL-SCNC: 25 MMOL/L (ref 22–29)
CREAT SERPL-MCNC: 2.2 MG/DL (ref 0.7–1.2)
EOSINOPHIL # BLD: 0.59 K/UL (ref 0.05–0.5)
EOSINOPHILS RELATIVE PERCENT: 7 % (ref 0–6)
ERYTHROCYTE [DISTWIDTH] IN BLOOD BY AUTOMATED COUNT: 18.5 % (ref 11.5–15)
GFR, ESTIMATED: 30 ML/MIN/1.73M2
GLUCOSE BLD-MCNC: 132 MG/DL (ref 74–99)
GLUCOSE BLD-MCNC: 170 MG/DL (ref 74–99)
GLUCOSE BLD-MCNC: 194 MG/DL (ref 74–99)
GLUCOSE BLD-MCNC: 215 MG/DL (ref 74–99)
GLUCOSE SERPL-MCNC: 127 MG/DL (ref 74–99)
HAV IGM SERPL QL IA: NONREACTIVE
HBV CORE IGM SERPL QL IA: NONREACTIVE
HBV SURFACE AG SERPL QL IA: NONREACTIVE
HCT VFR BLD AUTO: 25.3 % (ref 37–54)
HCV AB SERPL QL IA: NONREACTIVE
HGB BLD-MCNC: 7.7 G/DL (ref 12.5–16.5)
IMM GRANULOCYTES # BLD AUTO: 0.29 K/UL (ref 0–0.58)
IMM GRANULOCYTES NFR BLD: 4 % (ref 0–5)
LYMPHOCYTES NFR BLD: 0.67 K/UL (ref 1.5–4)
LYMPHOCYTES RELATIVE PERCENT: 8 % (ref 20–42)
MCH RBC QN AUTO: 30.4 PG (ref 26–35)
MCHC RBC AUTO-ENTMCNC: 30.4 G/DL (ref 32–34.5)
MCV RBC AUTO: 100 FL (ref 80–99.9)
MONOCYTES NFR BLD: 0.82 K/UL (ref 0.1–0.95)
MONOCYTES NFR BLD: 10 % (ref 2–12)
NEUTROPHILS NFR BLD: 69 % (ref 43–80)
NEUTS SEG NFR BLD: 5.47 K/UL (ref 1.8–7.3)
PLATELET # BLD AUTO: 199 K/UL (ref 130–450)
PMV BLD AUTO: 9.8 FL (ref 7–12)
POTASSIUM SERPL-SCNC: 4.4 MMOL/L (ref 3.5–5.1)
RBC # BLD AUTO: 2.53 M/UL (ref 3.8–5.8)
SODIUM SERPL-SCNC: 133 MMOL/L (ref 136–145)
WBC OTHER # BLD: 7.9 K/UL (ref 4.5–11.5)

## 2025-07-23 PROCEDURE — 6360000002 HC RX W HCPCS

## 2025-07-23 PROCEDURE — 94660 CPAP INITIATION&MGMT: CPT

## 2025-07-23 PROCEDURE — 99233 SBSQ HOSP IP/OBS HIGH 50: CPT | Performed by: INTERNAL MEDICINE

## 2025-07-23 PROCEDURE — 85025 COMPLETE CBC W/AUTO DIFF WBC: CPT

## 2025-07-23 PROCEDURE — 80048 BASIC METABOLIC PNL TOTAL CA: CPT

## 2025-07-23 PROCEDURE — 36415 COLL VENOUS BLD VENIPUNCTURE: CPT

## 2025-07-23 PROCEDURE — 80074 ACUTE HEPATITIS PANEL: CPT

## 2025-07-23 PROCEDURE — 6370000000 HC RX 637 (ALT 250 FOR IP)

## 2025-07-23 PROCEDURE — 1200000000 HC SEMI PRIVATE

## 2025-07-23 PROCEDURE — 82962 GLUCOSE BLOOD TEST: CPT

## 2025-07-23 PROCEDURE — 2500000003 HC RX 250 WO HCPCS

## 2025-07-23 PROCEDURE — 2580000003 HC RX 258

## 2025-07-23 PROCEDURE — 2700000000 HC OXYGEN THERAPY PER DAY

## 2025-07-23 RX ORDER — HYDROXYZINE PAMOATE 25 MG/1
25 CAPSULE ORAL 3 TIMES DAILY PRN
Status: DISCONTINUED | OUTPATIENT
Start: 2025-07-23 | End: 2025-07-25 | Stop reason: HOSPADM

## 2025-07-23 RX ADMIN — HYDROXYZINE PAMOATE 25 MG: 25 CAPSULE ORAL at 07:50

## 2025-07-23 RX ADMIN — SUCRALFATE 1 G: 1 TABLET ORAL at 06:20

## 2025-07-23 RX ADMIN — SODIUM CHLORIDE, PRESERVATIVE FREE 10 ML: 5 INJECTION INTRAVENOUS at 20:07

## 2025-07-23 RX ADMIN — HYDROXYZINE PAMOATE 25 MG: 25 CAPSULE ORAL at 23:49

## 2025-07-23 RX ADMIN — MIDODRINE HYDROCHLORIDE 20 MG: 5 TABLET ORAL at 16:46

## 2025-07-23 RX ADMIN — METOPROLOL SUCCINATE 12.5 MG: 25 TABLET, EXTENDED RELEASE ORAL at 07:49

## 2025-07-23 RX ADMIN — BUMETANIDE 2 MG: 1 TABLET ORAL at 07:49

## 2025-07-23 RX ADMIN — LEVOTHYROXINE SODIUM 150 MCG: 0.1 TABLET ORAL at 06:20

## 2025-07-23 RX ADMIN — TAMSULOSIN HYDROCHLORIDE 0.4 MG: 0.4 CAPSULE ORAL at 07:50

## 2025-07-23 RX ADMIN — SODIUM CHLORIDE, PRESERVATIVE FREE 40 MG: 5 INJECTION INTRAVENOUS at 07:48

## 2025-07-23 RX ADMIN — SERTRALINE 50 MG: 50 TABLET, FILM COATED ORAL at 07:49

## 2025-07-23 RX ADMIN — Medication 2000 UNITS: at 07:48

## 2025-07-23 RX ADMIN — MIDODRINE HYDROCHLORIDE 20 MG: 5 TABLET ORAL at 07:48

## 2025-07-23 RX ADMIN — INSULIN LISPRO 1 UNITS: 100 INJECTION, SOLUTION INTRAVENOUS; SUBCUTANEOUS at 16:46

## 2025-07-23 RX ADMIN — SODIUM CHLORIDE, PRESERVATIVE FREE 40 MG: 5 INJECTION INTRAVENOUS at 20:07

## 2025-07-23 RX ADMIN — INSULIN LISPRO 1 UNITS: 100 INJECTION, SOLUTION INTRAVENOUS; SUBCUTANEOUS at 11:02

## 2025-07-23 RX ADMIN — HYDROXYZINE PAMOATE 25 MG: 25 CAPSULE ORAL at 16:46

## 2025-07-23 RX ADMIN — ATORVASTATIN CALCIUM 40 MG: 40 TABLET, FILM COATED ORAL at 07:49

## 2025-07-23 RX ADMIN — MIDODRINE HYDROCHLORIDE 20 MG: 5 TABLET ORAL at 11:02

## 2025-07-23 RX ADMIN — BUMETANIDE 0.2 MG/HR: 0.25 INJECTION INTRAMUSCULAR; INTRAVENOUS at 13:09

## 2025-07-23 RX ADMIN — SODIUM CHLORIDE, PRESERVATIVE FREE 10 ML: 5 INJECTION INTRAVENOUS at 07:49

## 2025-07-23 ASSESSMENT — ENCOUNTER SYMPTOMS
ABDOMINAL PAIN: 0
COUGH: 0
SHORTNESS OF BREATH: 0

## 2025-07-23 NOTE — PROGRESS NOTES
Left message for Dr. Abrams regarding patient now wishing to pursue PD cath insertion as inpatient procedure

## 2025-07-23 NOTE — PROGRESS NOTES
[facility]  Internal Medicine Residency Program  House Team Progress Note      Patient:  Wander Rocha 70 y.o. male MRN: [id number] : 1955   Admitted:  2025 12:26 PM    Date of Service:  2025    Room: [address] [place] [address]-A    Chief complaint: had concerns including Abnormal Lab (Sent from [facility] regarding kidney function labs abnormal).     The patient is a 70 y.o. male, with PMH of HFimpEF (EF50-55%), persistent Afib, SND s/p dual chamber pacemaker, CRT-P, HTN, HLD, CKD stage 4, T2DM on insulin, hypothyroidism, NIKO had concerns including Abnormal Lab (Sent from [facility] regarding kidney function labs abnormal)., actively being managed for acute on chronic anemia and RADHA on CKD.    Subjective     Interval History:  70 M was being seen in the [facility] for follow up visit when labs showed worsening kidney function (BUN 91, Cr 2.9) and acute anemia (6.1). Presented to ED with SOB for the last 3-4 days which is worse with exertion and progressive swelling of bilateral lower legs for 1 week. Also had diarrhea with dark tarry stools since 1 day before presentation. Hemodynamically stable in the ED. In the ED, given 2 units PRBC, Lasix 20 mg IV, KCl 40 mEq. Has been on 3 L of O2    Overnight events:  - Complaining of itching of legs, Wound Care consulted, hyroxyzine 25 mg given  - Eliquis 2.5 mg held by     Hospital Day: 7  Today's Course:  Pt reports itchiness of bilateral lower legs, reports hydroxyzine did not relieve symptoms. Reports one of the blisters on his anterior leg popped. On 3L O2, same has baseline.     REVIEW OF SYSTEMS:  Review of Systems   Respiratory:  Negative for cough and shortness of breath.    Cardiovascular:  Positive for leg swelling. Negative for chest pain.   Gastrointestinal:  Negative for abdominal pain.   Genitourinary:  Negative for difficulty urinating.   Musculoskeletal:  Negative for gait problem.   Skin:  Positive for wound.   Neurological:  Negative for dizziness and  \"SPNEUAGU\"  Acid fast No results found for: \"AFBSMEAR\"  Stool No results found for: \"CXST\"  Fungust No results found for: \"FUNGUSSMEAR\"  VRE screen No results found for: \"VRECX\"  Ecoli O051:H7 No results found for: \"WEBOT309\"  Giardia No results found for: \"GIAAGS\"  Rotavirus No results found for: \"ROTA\"  Fecal leukocytes No results found for: \"FECLEU\"  -----------------------------------------------------  Fecal occult blood: .No results for input(s): \"OCCULTBLDFEC\" in the last 72 hours.  Occult blood screening: No results for input(s): \"OCCBS\" in the last 72 hours.  Occult blood QC: No results for input(s): \"OBQC\" in the last 72 hours.      Medications     Continuous Infusions:   sodium chloride      sodium chloride      sodium chloride      dextrose       Scheduled Meds:   bumetanide  2 mg Oral BID    [Held by provider] apixaban  2.5 mg Oral BID    sucralfate  1 g Oral QAM AC    [Held by provider] allopurinol  200 mg Oral Daily    atorvastatin  40 mg Oral Daily    levothyroxine  150 mcg Oral Daily    [Held by provider] metOLazone  5 mg Oral Daily    metoprolol succinate  12.5 mg Oral Daily    sertraline  50 mg Oral Daily    tamsulosin  0.4 mg Oral Daily    vitamin D  2,000 Units Oral Daily    midodrine  20 mg Oral TID WC    [Held by provider] bumetanide  2 mg Oral BID    sodium chloride flush  5-40 mL IntraVENous 2 times per day    pantoprazole (PROTONIX) 40 mg in sodium chloride (PF) 0.9 % 10 mL injection  40 mg IntraVENous Q12H    [Held by provider] bumetanide  2 mg IntraVENous BID    insulin lispro  0-4 Units SubCUTAneous 4x Daily AC & HS     PRN Meds: hydrOXYzine pamoate, sodium chloride, sodium chloride, ammonium lactate, sodium chloride flush, sodium chloride, ondansetron **OR** ondansetron, polyethylene glycol, acetaminophen **OR** acetaminophen, glucose, dextrose bolus **OR** dextrose bolus, glucagon (rDNA), dextrose      Imaging studies     CT CHEST WO CONTRAST   Final Result   1. Moderate right pleural

## 2025-07-23 NOTE — PLAN OF CARE
Problem: Chronic Conditions and Co-morbidities  Goal: Patient's chronic conditions and co-morbidity symptoms are monitored and maintained or improved  Outcome: Progressing     Problem: Skin/Tissue Integrity  Goal: Skin integrity remains intact  Description: 1.  Monitor for areas of redness and/or skin breakdown  2.  Assess vascular access sites hourly  3.  Every 4-6 hours minimum:  Change oxygen saturation probe site  4.  Every 4-6 hours:  If on nasal continuous positive airway pressure, respiratory therapy assess nares and determine need for appliance change or resting period  Outcome: Progressing     Problem: Safety - Adult  Goal: Free from fall injury  Outcome: Progressing     Problem: Pain  Goal: Verbalizes/displays adequate comfort level or baseline comfort level  Outcome: Progressing     Problem: ABCDS Injury Assessment  Goal: Absence of physical injury  Outcome: Progressing

## 2025-07-23 NOTE — CARE COORDINATION
CM update note.  Discharge order noted.  Plan is home with [facility] by [employer].  LINDSAY orders are on the chart. [facility] notified of discharge.   Patient wife to provide transport home and bring portable oxygen tank.    Nephrology following and requested PD catheter placement.  Gen surgery met with patient who does not want it placed during admission.  Patient requested this be scheduled as outpatient.  Patient wife requested smaller portable oxygen tank for home.  Spoke with Stacy at [facility].  Order obtained for portable oxygen concentrator.  Order faxed to [facility] at [phone].      1155:  Discharge order cancelled.  Patient now wants to have PD catheter done as inpatient.  Await input and plan.      1330:  received a call from Zulema from the [facility].  Hep panel is needed.  Plan is for PD cath placement tomorrow.  If patient is discharged tomorrow patient will need to follow up at [facility] on Friday to have the PD cath flushed.  Started on Bumex gtt.  Milton Ramos RN [phone].

## 2025-07-23 NOTE — DISCHARGE INSTRUCTIONS
Internal Medicine    Follow ups  Please follow up with the internal medicine clinic at [facility]. Your appointment has been scheduled within the next 10 days   Please keep all other follow up appointments:    Keaton Abrams MD  General Surgery [phone] [facility] [address] [facility] [address]      Next Steps: Call  Instructions: For Peritoneal Dialysis Catheter Placement    [facility] [phone] [address] [place] [address]         [facility]   [phone] Home/Peritoneal Dialysis only [address] [place] [address]         Changes in healthcare   Please take all medications as indicated  Diet: low sodium diet   Activity: activity as tolerated  New Medications started during this hospital stay  None  Changes to your medications  Apixaban (Eliquis) 2.5 mg/tab TWICE daily  Metolazone 2.5 mg/tab once daily  Medications you should stop taking   None  Additional labs, testing or imaging needed after discharge   None  Please contact us if you have any concerns, wish to change or make an appointment:  Internal medicine clinic   Phone: [phone]  Fax: [phone]  [address] [place] [address]  Or please call the nurse line at [phone].  Should you have further questions in regards to this visit, you can review your clinical note and after visit summary document on your [employer] account.  Other questions can be directed to our nurse line at [phone].     Other than any new prescriptions given to you today, the list of home medications on this After Visit Summary are based on information provided to us from you and your healthcare providers. This information, including the list, dose, and frequency of medications is only as accurate as the information you provided. If you have any questions or concerns about your home medications,  please contact your Primary Care Physician for further clarification.

## 2025-07-23 NOTE — PROGRESS NOTES
Department of Internal Medicine  Nephrology Attending progress Note      Events reviewed    SUBJECTIVE:  We are following for CHF and CKD. He has no complaints today.     PHYSICAL EXAM:      Vitals:    VITALS:  /64   Pulse 72   Temp 97.6 °F (36.4 °C) (Temporal)   Resp 18   Ht 1.702 m (5' 7.01\")   Wt 91.4 kg (201 lb 7 oz)   SpO2 98%   BMI 31.54 kg/m²   24HR INTAKE/OUTPUT:    Intake/Output Summary (Last 24 hours) at 7/23/2025 1022  Last data filed at 7/23/2025 0838  Gross per 24 hour   Intake 380 ml   Output --   Net 380 ml       Constitutional:  Awake, alert, oriented, in NAD  HEENT:  PERRLA, normocephalic, atraumatic  Respiratory:  diminished  Cardiovascular/Edema:  RRR, normal S1, normal S2 ++ edema   Gastrointestinal:  Soft, flat, non-distended, non-tender  Neurologic:  Nonfocal  Skin:  warm, dry, no rashes, no lesions    Scheduled Meds:   bumetanide  2 mg Oral BID    [Held by provider] apixaban  2.5 mg Oral BID    sucralfate  1 g Oral QAM AC    [Held by provider] allopurinol  200 mg Oral Daily    atorvastatin  40 mg Oral Daily    levothyroxine  150 mcg Oral Daily    [Held by provider] metOLazone  5 mg Oral Daily    metoprolol succinate  12.5 mg Oral Daily    sertraline  50 mg Oral Daily    tamsulosin  0.4 mg Oral Daily    vitamin D  2,000 Units Oral Daily    midodrine  20 mg Oral TID WC    [Held by provider] bumetanide  2 mg Oral BID    sodium chloride flush  5-40 mL IntraVENous 2 times per day    pantoprazole (PROTONIX) 40 mg in sodium chloride (PF) 0.9 % 10 mL injection  40 mg IntraVENous Q12H    [Held by provider] bumetanide  2 mg IntraVENous BID    insulin lispro  0-4 Units SubCUTAneous 4x Daily AC & HS     Continuous Infusions:   sodium chloride      sodium chloride      sodium chloride      dextrose       PRN Meds:.hydrOXYzine pamoate, sodium chloride, sodium chloride, ammonium lactate, sodium chloride flush, sodium chloride, ondansetron **OR** ondansetron, polyethylene glycol, acetaminophen

## 2025-07-23 NOTE — PLAN OF CARE
Problem: Chronic Conditions and Co-morbidities  Goal: Patient's chronic conditions and co-morbidity symptoms are monitored and maintained or improved  7/23/2025 0841 by Romelia Garcia RN  Outcome: Progressing  7/22/2025 2301 by Sherley Castro RN  Outcome: Progressing     Problem: Skin/Tissue Integrity  Goal: Skin integrity remains intact  Description: 1.  Monitor for areas of redness and/or skin breakdown  2.  Assess vascular access sites hourly  3.  Every 4-6 hours minimum:  Change oxygen saturation probe site  4.  Every 4-6 hours:  If on nasal continuous positive airway pressure, respiratory therapy assess nares and determine need for appliance change or resting period  7/23/2025 0841 by Romelia Garcia RN  Outcome: Progressing  7/22/2025 2301 by Sherley Castro RN  Outcome: Progressing     Problem: Safety - Adult  Goal: Free from fall injury  7/23/2025 0841 by Romelia Garcia RN  Outcome: Progressing  7/22/2025 2301 by Sherley Castro RN  Outcome: Progressing     Problem: Pain  Goal: Verbalizes/displays adequate comfort level or baseline comfort level  7/23/2025 0841 by Romelia Garcia RN  Outcome: Progressing  Flowsheets (Taken 7/23/2025 0830)  Verbalizes/displays adequate comfort level or baseline comfort level: Encourage patient to monitor pain and request assistance  7/22/2025 2301 by Sherley Castro RN  Outcome: Progressing     Problem: ABCDS Injury Assessment  Goal: Absence of physical injury  7/23/2025 0841 by Romelia Garcia RN  Outcome: Progressing  7/22/2025 2301 by Sherley Castro RN  Outcome: Progressing

## 2025-07-23 NOTE — PROGRESS NOTES
[facility]  Internal Medicine Residency Program  Progress Note - House Team       Patient:  Wander Rocha 70 y.o. male   MRN: [id number]       Date of Service: 7/23/2025    Chief Complaint: had concerns including Abnormal Lab (Sent from [facility] regarding kidney function labs abnormal).  Subjective     Overnight, no significant events  Patient was seen and examined bedside, no acute distress.  Patient is on his baseline oxygen of 4 L; no acute complaints today.  Patient was initially offered peritoneal dialysis placement as outpatient however wishes to pursue now as inpatient.  Will continue to follow    Objective   Vitals: /64   Pulse 72   Temp 97.6 °F (36.4 °C) (Temporal)   Resp 18   Ht 1.702 m (5' 7.01\")   Wt 91.4 kg (201 lb 7 oz)   SpO2 98%   BMI 31.54 kg/m²  Ideal body weight: 66.1 kg (145 lb 12.2 oz)    I & O - 24hr:    Intake/Output Summary (Last 24 hours) at 7/23/2025 1357  Last data filed at 7/23/2025 0838  Gross per 24 hour   Intake 380 ml   Output --   Net 380 ml     General Appearance: alert, appears stated age, and cooperative  HEENT:  Head: Normocephalic, no lesions, without obvious abnormality.  Lung: clear to auscultation bilaterally and diminished breath sounds bilaterally; on baseline 4 L O2  Heart: regular rate and rhythm and S1, S2 normal  Abdomen: soft, non-tender; bowel sounds normal; no masses,  no organomegaly  Extremities:  edema 2+ pitting edema bilaterally, erythematous bilateral lower legs  Neurologic: No focal deficits    Labs and Imaging Studies     Recent Labs     07/20/25  1734 07/22/25  1512 07/23/25  0752   WBC 6.7 7.7 7.9   HGB 8.1* 7.5* 7.7*   HCT 26.6* 25.0* 25.3*   MCV 98.9 101.6* 100.0*    181 199     Recent Labs     07/20/25  1734 07/21/25  0816 07/21/25  1546 07/22/25  0422 07/23/25  0438   *   < > 135* 135* 133*   K 4.3   < > 4.5 4.4 4.4   CL 94*   < > 97* 98 95*   CO2 27   < > 27 25 25   BUN 74*   < > 69* 67* 65*   CREATININE 2.5*

## 2025-07-23 NOTE — PROGRESS NOTES
Department of General Surgery - Adult  Surgical Service GS  Attending Progress Note      SUBJECTIVE:  moderately SOB    OBJECTIVE  improved hemodynamics and chemistry to baseline    Physical    VITALS:  /64   Pulse 72   Temp 97.6 °F (36.4 °C) (Temporal)   Resp 18   Ht 1.702 m (5' 7.01\")   Wt 91.4 kg (201 lb 7 oz)   SpO2 98%   BMI 31.54 kg/m²   INTAKE/OUTPUT:    Intake/Output Summary (Last 24 hours) at 2025 [facility]  Last data filed at 2025 0838  Gross per 24 hour   Intake 380 ml   Output --   Net 380 ml     TEMPERATURE:  Current - Temp: 97.6 °F (36.4 °C); Max - Temp  Av.7 °F (36.5 °C)  Min: 97.6 °F (36.4 °C)  Max: 97.7 °F (36.5 °C)  RESPIRATIONS RANGE: Resp  Av  Min: 18  Max: 18  PULSE RANGE: Pulse  Av  Min: 72  Max: 81  BLOOD PRESSURE RANGE:  Systolic (24hrs), Av , Min:117 , Max:134  ; Diastolic (24hrs), Av, Min:60, Max:64   PULSE OXIMETRY RANGE: SpO2  Av.3 %  Min: 98 %  Max: 99 %  CONSTITUTIONAL:  fatigued, alert, cooperative, mild distress, appears older than stated age, and mildly obese  EYES:  lids and lashes normal, pupils equal, round and reactive to light, and extra-ocular muscles intact  NECK:  supple, symmetrical, trachea midline, skin normal, and no stridor  LUNGS:  Moderate respiratory distress, moderate air exchange, no retractions, and diminished breath sounds right base  CARDIOVASCULAR:  normal apical pulses, regularly irregular rhythm, and normal S1 and S2  ABDOMEN:  normal bowel sounds, soft, non-distended, non-tender, voluntary guarding absent, and no masses palpated  Data  CBC with Differential:    Lab Results   Component Value Date/Time    WBC 7.9 2025 07:52 AM    RBC 2.53 2025 07:52 AM    HGB 7.7 2025 07:52 AM    HCT 25.3 2025 07:52 AM     2025 07:52 AM    .0 2025 07:52 AM    MCH 30.4 2025 07:52 AM    MCHC 30.4 2025 07:52 AM    RDW 18.5 2025 07:52 AM    NRBC 1 2025 05:53 PM

## 2025-07-24 ENCOUNTER — ANESTHESIA EVENT (OUTPATIENT)
Dept: OPERATING ROOM | Age: 70
End: 2025-07-24
Payer: MEDICARE

## 2025-07-24 ENCOUNTER — ANESTHESIA (OUTPATIENT)
Dept: OPERATING ROOM | Age: 70
End: 2025-07-24
Payer: MEDICARE

## 2025-07-24 LAB
ANION GAP SERPL CALCULATED.3IONS-SCNC: 12 MMOL/L (ref 7–16)
BASOPHILS # BLD: 0.06 K/UL (ref 0–0.2)
BASOPHILS NFR BLD: 1 % (ref 0–2)
BUN SERPL-MCNC: 66 MG/DL (ref 8–23)
CALCIUM SERPL-MCNC: 8.5 MG/DL (ref 8.8–10.2)
CHLORIDE SERPL-SCNC: 94 MMOL/L (ref 98–107)
CO2 SERPL-SCNC: 26 MMOL/L (ref 22–29)
CREAT SERPL-MCNC: 2.1 MG/DL (ref 0.7–1.2)
EOSINOPHIL # BLD: 0.28 K/UL (ref 0.05–0.5)
EOSINOPHILS RELATIVE PERCENT: 4 % (ref 0–6)
ERYTHROCYTE [DISTWIDTH] IN BLOOD BY AUTOMATED COUNT: 17.9 % (ref 11.5–15)
GFR, ESTIMATED: 32 ML/MIN/1.73M2
GLUCOSE BLD-MCNC: 123 MG/DL (ref 74–99)
GLUCOSE BLD-MCNC: 140 MG/DL (ref 74–99)
GLUCOSE BLD-MCNC: 142 MG/DL (ref 74–99)
GLUCOSE SERPL-MCNC: 126 MG/DL (ref 74–99)
HCT VFR BLD AUTO: 23.9 % (ref 37–54)
HGB BLD-MCNC: 7.3 G/DL (ref 12.5–16.5)
LYMPHOCYTES NFR BLD: 0.23 K/UL (ref 1.5–4)
LYMPHOCYTES RELATIVE PERCENT: 4 % (ref 20–42)
MCH RBC QN AUTO: 30.2 PG (ref 26–35)
MCHC RBC AUTO-ENTMCNC: 30.5 G/DL (ref 32–34.5)
MCV RBC AUTO: 98.8 FL (ref 80–99.9)
MONOCYTES NFR BLD: 0.17 K/UL (ref 0.1–0.95)
MONOCYTES NFR BLD: 3 % (ref 2–12)
NEUTROPHILS NFR BLD: 88 % (ref 43–80)
NEUTS SEG NFR BLD: 5.57 K/UL (ref 1.8–7.3)
PLATELET # BLD AUTO: 178 K/UL (ref 130–450)
PMV BLD AUTO: 9.6 FL (ref 7–12)
POTASSIUM SERPL-SCNC: 4.2 MMOL/L (ref 3.5–5.1)
RBC # BLD AUTO: 2.42 M/UL (ref 3.8–5.8)
RBC # BLD: ABNORMAL 10*6/UL
SODIUM SERPL-SCNC: 132 MMOL/L (ref 136–145)
SURGICAL PATHOLOGY REPORT: NORMAL
WBC OTHER # BLD: 6.3 K/UL (ref 4.5–11.5)

## 2025-07-24 PROCEDURE — 86923 COMPATIBILITY TEST ELECTRIC: CPT

## 2025-07-24 PROCEDURE — 2500000003 HC RX 250 WO HCPCS

## 2025-07-24 PROCEDURE — 2500000003 HC RX 250 WO HCPCS: Performed by: SURGERY

## 2025-07-24 PROCEDURE — 86901 BLOOD TYPING SEROLOGIC RH(D): CPT

## 2025-07-24 PROCEDURE — 3700000000 HC ANESTHESIA ATTENDED CARE: Performed by: SURGERY

## 2025-07-24 PROCEDURE — 94640 AIRWAY INHALATION TREATMENT: CPT

## 2025-07-24 PROCEDURE — 3700000001 HC ADD 15 MINUTES (ANESTHESIA): Performed by: SURGERY

## 2025-07-24 PROCEDURE — 6370000000 HC RX 637 (ALT 250 FOR IP)

## 2025-07-24 PROCEDURE — 36430 TRANSFUSION BLD/BLD COMPNT: CPT

## 2025-07-24 PROCEDURE — 36415 COLL VENOUS BLD VENIPUNCTURE: CPT

## 2025-07-24 PROCEDURE — 2580000003 HC RX 258: Performed by: NURSE ANESTHETIST, CERTIFIED REGISTERED

## 2025-07-24 PROCEDURE — 6360000002 HC RX W HCPCS

## 2025-07-24 PROCEDURE — 3600000014 HC SURGERY LEVEL 4 ADDTL 15MIN: Performed by: SURGERY

## 2025-07-24 PROCEDURE — 2500000003 HC RX 250 WO HCPCS: Performed by: NURSE ANESTHETIST, CERTIFIED REGISTERED

## 2025-07-24 PROCEDURE — 86900 BLOOD TYPING SEROLOGIC ABO: CPT

## 2025-07-24 PROCEDURE — 80048 BASIC METABOLIC PNL TOTAL CA: CPT

## 2025-07-24 PROCEDURE — 6360000002 HC RX W HCPCS: Performed by: NURSE ANESTHETIST, CERTIFIED REGISTERED

## 2025-07-24 PROCEDURE — 2580000003 HC RX 258: Performed by: SURGERY

## 2025-07-24 PROCEDURE — 6360000002 HC RX W HCPCS: Performed by: SURGERY

## 2025-07-24 PROCEDURE — P9016 RBC LEUKOCYTES REDUCED: HCPCS

## 2025-07-24 PROCEDURE — C1750 CATH, HEMODIALYSIS,LONG-TERM: HCPCS | Performed by: SURGERY

## 2025-07-24 PROCEDURE — 99233 SBSQ HOSP IP/OBS HIGH 50: CPT | Performed by: INTERNAL MEDICINE

## 2025-07-24 PROCEDURE — 2709999900 HC NON-CHARGEABLE SUPPLY: Performed by: SURGERY

## 2025-07-24 PROCEDURE — 2580000003 HC RX 258

## 2025-07-24 PROCEDURE — 7100000001 HC PACU RECOVERY - ADDTL 15 MIN: Performed by: SURGERY

## 2025-07-24 PROCEDURE — 0W9G40Z DRAINAGE OF PERITONEAL CAVITY WITH DRAINAGE DEVICE, PERCUTANEOUS ENDOSCOPIC APPROACH: ICD-10-PCS | Performed by: SURGERY

## 2025-07-24 PROCEDURE — 6370000000 HC RX 637 (ALT 250 FOR IP): Performed by: ANESTHESIOLOGY

## 2025-07-24 PROCEDURE — 3600000004 HC SURGERY LEVEL 4 BASE: Performed by: SURGERY

## 2025-07-24 PROCEDURE — 82962 GLUCOSE BLOOD TEST: CPT

## 2025-07-24 PROCEDURE — 85025 COMPLETE CBC W/AUTO DIFF WBC: CPT

## 2025-07-24 PROCEDURE — 94660 CPAP INITIATION&MGMT: CPT

## 2025-07-24 PROCEDURE — 1200000000 HC SEMI PRIVATE

## 2025-07-24 PROCEDURE — 2700000000 HC OXYGEN THERAPY PER DAY

## 2025-07-24 PROCEDURE — 7100000000 HC PACU RECOVERY - FIRST 15 MIN: Performed by: SURGERY

## 2025-07-24 PROCEDURE — 86850 RBC ANTIBODY SCREEN: CPT

## 2025-07-24 RX ORDER — LIDOCAINE HYDROCHLORIDE 20 MG/ML
INJECTION, SOLUTION INTRAVENOUS
Status: DISCONTINUED | OUTPATIENT
Start: 2025-07-24 | End: 2025-07-24 | Stop reason: SDUPTHER

## 2025-07-24 RX ORDER — SODIUM CHLORIDE 9 MG/ML
INJECTION, SOLUTION INTRAVENOUS PRN
Status: DISCONTINUED | OUTPATIENT
Start: 2025-07-24 | End: 2025-07-24 | Stop reason: HOSPADM

## 2025-07-24 RX ORDER — HYDROCODONE BITARTRATE AND ACETAMINOPHEN 5; 325 MG/1; MG/1
1 TABLET ORAL EVERY 12 HOURS PRN
Status: DISCONTINUED | OUTPATIENT
Start: 2025-07-24 | End: 2025-07-25 | Stop reason: HOSPADM

## 2025-07-24 RX ORDER — PROPOFOL 10 MG/ML
INJECTION, EMULSION INTRAVENOUS
Status: DISCONTINUED | OUTPATIENT
Start: 2025-07-24 | End: 2025-07-24 | Stop reason: SDUPTHER

## 2025-07-24 RX ORDER — SODIUM CHLORIDE 0.9 % (FLUSH) 0.9 %
5-40 SYRINGE (ML) INJECTION EVERY 12 HOURS SCHEDULED
Status: DISCONTINUED | OUTPATIENT
Start: 2025-07-24 | End: 2025-07-24 | Stop reason: HOSPADM

## 2025-07-24 RX ORDER — FENTANYL CITRATE 50 UG/ML
50 INJECTION, SOLUTION INTRAMUSCULAR; INTRAVENOUS EVERY 5 MIN PRN
Status: DISCONTINUED | OUTPATIENT
Start: 2025-07-24 | End: 2025-07-24 | Stop reason: HOSPADM

## 2025-07-24 RX ORDER — SODIUM CHLORIDE 0.9 % (FLUSH) 0.9 %
5-40 SYRINGE (ML) INJECTION PRN
Status: DISCONTINUED | OUTPATIENT
Start: 2025-07-24 | End: 2025-07-24 | Stop reason: HOSPADM

## 2025-07-24 RX ORDER — FENTANYL CITRATE 50 UG/ML
INJECTION, SOLUTION INTRAMUSCULAR; INTRAVENOUS
Status: DISCONTINUED | OUTPATIENT
Start: 2025-07-24 | End: 2025-07-24 | Stop reason: SDUPTHER

## 2025-07-24 RX ORDER — IPRATROPIUM BROMIDE AND ALBUTEROL SULFATE 2.5; .5 MG/3ML; MG/3ML
1 SOLUTION RESPIRATORY (INHALATION) ONCE
Status: COMPLETED | OUTPATIENT
Start: 2025-07-24 | End: 2025-07-24

## 2025-07-24 RX ORDER — FENTANYL CITRATE 50 UG/ML
25 INJECTION, SOLUTION INTRAMUSCULAR; INTRAVENOUS EVERY 5 MIN PRN
Status: DISCONTINUED | OUTPATIENT
Start: 2025-07-24 | End: 2025-07-24 | Stop reason: HOSPADM

## 2025-07-24 RX ORDER — BUPIVACAINE HYDROCHLORIDE AND EPINEPHRINE 2.5; 5 MG/ML; UG/ML
INJECTION, SOLUTION EPIDURAL; INFILTRATION; INTRACAUDAL; PERINEURAL PRN
Status: DISCONTINUED | OUTPATIENT
Start: 2025-07-24 | End: 2025-07-24 | Stop reason: ALTCHOICE

## 2025-07-24 RX ORDER — PROCHLORPERAZINE EDISYLATE 5 MG/ML
5 INJECTION INTRAMUSCULAR; INTRAVENOUS
Status: DISCONTINUED | OUTPATIENT
Start: 2025-07-24 | End: 2025-07-24 | Stop reason: HOSPADM

## 2025-07-24 RX ORDER — SODIUM CHLORIDE 9 MG/ML
INJECTION, SOLUTION INTRAVENOUS
Status: DISCONTINUED | OUTPATIENT
Start: 2025-07-24 | End: 2025-07-24 | Stop reason: SDUPTHER

## 2025-07-24 RX ORDER — ROCURONIUM BROMIDE 10 MG/ML
INJECTION, SOLUTION INTRAVENOUS
Status: DISCONTINUED | OUTPATIENT
Start: 2025-07-24 | End: 2025-07-24 | Stop reason: SDUPTHER

## 2025-07-24 RX ORDER — SODIUM CHLORIDE 9 MG/ML
INJECTION, SOLUTION INTRAVENOUS PRN
Status: DISCONTINUED | OUTPATIENT
Start: 2025-07-24 | End: 2025-07-25 | Stop reason: HOSPADM

## 2025-07-24 RX ADMIN — MIDODRINE HYDROCHLORIDE 20 MG: 5 TABLET ORAL at 19:51

## 2025-07-24 RX ADMIN — PROPOFOL 100 MG: 10 INJECTION, EMULSION INTRAVENOUS at 15:01

## 2025-07-24 RX ADMIN — SODIUM CHLORIDE, PRESERVATIVE FREE 40 MG: 5 INJECTION INTRAVENOUS at 19:51

## 2025-07-24 RX ADMIN — SODIUM CHLORIDE, PRESERVATIVE FREE 40 MG: 5 INJECTION INTRAVENOUS at 07:48

## 2025-07-24 RX ADMIN — LIDOCAINE HYDROCHLORIDE 100 MG: 20 INJECTION, SOLUTION INTRAVENOUS at 15:01

## 2025-07-24 RX ADMIN — FENTANYL CITRATE 50 MCG: 50 INJECTION, SOLUTION INTRAMUSCULAR; INTRAVENOUS at 15:59

## 2025-07-24 RX ADMIN — FENTANYL CITRATE 50 MCG: 50 INJECTION, SOLUTION INTRAMUSCULAR; INTRAVENOUS at 15:02

## 2025-07-24 RX ADMIN — BUMETANIDE 0.2 MG/HR: 0.25 INJECTION INTRAMUSCULAR; INTRAVENOUS at 13:29

## 2025-07-24 RX ADMIN — IPRATROPIUM BROMIDE AND ALBUTEROL SULFATE 1 DOSE: .5; 2.5 SOLUTION RESPIRATORY (INHALATION) at 17:12

## 2025-07-24 RX ADMIN — ONDANSETRON HYDROCHLORIDE 4 MG: 2 SOLUTION INTRAMUSCULAR; INTRAVENOUS at 15:40

## 2025-07-24 RX ADMIN — HYDROXYZINE PAMOATE 25 MG: 25 CAPSULE ORAL at 13:25

## 2025-07-24 RX ADMIN — ROCURONIUM BROMIDE 20 MG: 10 INJECTION, SOLUTION INTRAVENOUS at 15:25

## 2025-07-24 RX ADMIN — SUGAMMADEX 190 MG: 100 INJECTION, SOLUTION INTRAVENOUS at 15:49

## 2025-07-24 RX ADMIN — SODIUM CHLORIDE: 9 INJECTION, SOLUTION INTRAVENOUS at 14:55

## 2025-07-24 RX ADMIN — VANCOMYCIN HYDROCHLORIDE 1000 MG: 1 INJECTION, POWDER, LYOPHILIZED, FOR SOLUTION INTRAVENOUS at 15:21

## 2025-07-24 RX ADMIN — SODIUM CHLORIDE, PRESERVATIVE FREE 10 ML: 5 INJECTION INTRAVENOUS at 19:52

## 2025-07-24 RX ADMIN — SODIUM CHLORIDE, PRESERVATIVE FREE 10 ML: 5 INJECTION INTRAVENOUS at 07:48

## 2025-07-24 RX ADMIN — ROCURONIUM BROMIDE 30 MG: 10 INJECTION, SOLUTION INTRAVENOUS at 15:02

## 2025-07-24 ASSESSMENT — PAIN SCALES - GENERAL
PAINLEVEL_OUTOF10: 0

## 2025-07-24 ASSESSMENT — ENCOUNTER SYMPTOMS
SHORTNESS OF BREATH: 0
COUGH: 0

## 2025-07-24 NOTE — PROGRESS NOTES
[facility]  Internal Medicine Residency Program  Progress Note - House Team       Patient:  Wander Rocha 70 y.o. male   MRN: [id number]       Date of Service: 7/24/2025    Chief Complaint: had concerns including Abnormal Lab (Sent from [facility] regarding kidney function labs abnormal).  Subjective     Overnight, no significant events.     Patient was seen and examined bedside, no acute distress. Patient was sitting in a chair, watching TV. Patient reports feeling sad about having to get a peritoneal dialysis catheter. He is alert and oriented, no acute complaints.     Objective   Vitals: BP (!) 123/51   Pulse 61   Temp 97 °F (36.1 °C) (Temporal)   Resp 14   Ht 1.702 m (5' 7.01\")   Wt 91.4 kg (201 lb 7 oz)   SpO2 100%   BMI 31.54 kg/m²  Ideal body weight: 66.1 kg (145 lb 12.2 oz)    I & O - 24hr:    Intake/Output Summary (Last 24 hours) at 7/24/2025 0749  Last data filed at 7/24/2025 0508  Gross per 24 hour   Intake 440 ml   Output 650 ml   Net -210 ml     General Appearance: alert, appears stated age, and cooperative  HEENT:  Head: Normocephalic, no lesions, without obvious abnormality.  Eye: Normal external eye, conjunctiva, lids cornea, JELENA.  Ears: Normal TM's bilaterally. Normal auditory canals and external ears. Non-tender.  Nose: Normal external nose, mucus membranes and septum.  Pharynx: Dental Hygiene adequate. Normal buccal mucosa. Normal pharynx.  Neck / Thyroid: Supple, no masses, nodes, nodules or enlargement.  Lung: clear to auscultation bilaterally  Heart: regular rate and rhythm and S1, S2 normal  Abdomen: soft, non-tender; bowel sounds normal; no masses,  no organomegaly  Extremities:  atraumatic, no edema   Neurologic: Alert, oriented, thought content appropriate    Labs and Imaging Studies     Recent Labs     07/22/25  1512 07/23/25  0752 07/24/25  0432   WBC 7.7 7.9 6.3   HGB 7.5* 7.7* 7.3*   HCT 25.0* 25.3* 23.9*   .6* 100.0* 98.8    199 178     Recent Labs

## 2025-07-24 NOTE — PLAN OF CARE
Problem: Chronic Conditions and Co-morbidities  Goal: Patient's chronic conditions and co-morbidity symptoms are monitored and maintained or improved  7/24/2025 0830 by Romelia Garcia RN  Outcome: Progressing  7/23/2025 2342 by Jovanny Cruz RN  Outcome: Progressing     Problem: Skin/Tissue Integrity  Goal: Skin integrity remains intact  Description: 1.  Monitor for areas of redness and/or skin breakdown  2.  Assess vascular access sites hourly  3.  Every 4-6 hours minimum:  Change oxygen saturation probe site  4.  Every 4-6 hours:  If on nasal continuous positive airway pressure, respiratory therapy assess nares and determine need for appliance change or resting period  7/24/2025 0830 by Romelia Garcia RN  Outcome: Progressing  7/23/2025 2342 by Jovanny Cruz RN  Outcome: Progressing     Problem: Safety - Adult  Goal: Free from fall injury  7/24/2025 0830 by Romelia Garcia RN  Outcome: Progressing  7/23/2025 2342 by Jovanny Cruz RN  Outcome: Progressing     Problem: Pain  Goal: Verbalizes/displays adequate comfort level or baseline comfort level  7/24/2025 0830 by Romelia Garcia RN  Outcome: Progressing  Flowsheets (Taken 7/24/2025 0815)  Verbalizes/displays adequate comfort level or baseline comfort level: Encourage patient to monitor pain and request assistance  7/23/2025 2342 by Jovanny Cruz RN  Outcome: Progressing     Problem: ABCDS Injury Assessment  Goal: Absence of physical injury  Outcome: Progressing

## 2025-07-24 NOTE — PROCEDURES
Brief Postoperative Note    Wander Rocha  YOB: 1955  [id number]    Pre-operative Diagnosis: ESRD volume mismatch    Post-operative Diagnosis: Same    Procedure: lap PD    Anesthesia: General    Surgeons/Assistants: none    Estimated Blood Loss: less than 50     Complications: None    Specimens: Was Not Obtained    Findings: ESRD biventricular failure moderately hypertrophic cirrhosis compensated    Electronically signed by Keaton Abrams MD on 7/24/2025 at 3:56 PM

## 2025-07-24 NOTE — PROGRESS NOTES
Department of Internal Medicine  Nephrology Attending progress Note      Events reviewed    SUBJECTIVE:  We are following for CHF and CKD. He has no complaints today.     PHYSICAL EXAM:      Vitals:    VITALS:  BP (!) 123/51   Pulse 61   Temp 97 °F (36.1 °C) (Temporal)   Resp 14   Ht 1.702 m (5' 7.01\")   Wt 91.4 kg (201 lb 7 oz)   SpO2 100%   BMI 31.54 kg/m²   24HR INTAKE/OUTPUT:    Intake/Output Summary (Last 24 hours) at 7/24/2025 0915  Last data filed at 7/24/2025 0829  Gross per 24 hour   Intake 240 ml   Output 950 ml   Net -710 ml       Constitutional:  Awake, alert, oriented, in NAD  HEENT:  PERRLA, normocephalic, atraumatic  Respiratory:  diminished  Cardiovascular/Edema:  RRR, normal S1, normal S2 ++ edema   Gastrointestinal:  Soft, flat, non-distended, non-tender  Neurologic:  Nonfocal  Skin:  warm, dry, no rashes, no lesions    Scheduled Meds:   vancomycin  1,000 mg IntraVENous On Call to OR    [Held by provider] bumetanide  2 mg Oral BID    [Held by provider] apixaban  2.5 mg Oral BID    sucralfate  1 g Oral QAM AC    [Held by provider] allopurinol  200 mg Oral Daily    atorvastatin  40 mg Oral Daily    levothyroxine  150 mcg Oral Daily    metOLazone  5 mg Oral Daily    metoprolol succinate  12.5 mg Oral Daily    sertraline  50 mg Oral Daily    tamsulosin  0.4 mg Oral Daily    vitamin D  2,000 Units Oral Daily    midodrine  20 mg Oral TID WC    sodium chloride flush  5-40 mL IntraVENous 2 times per day    pantoprazole (PROTONIX) 40 mg in sodium chloride (PF) 0.9 % 10 mL injection  40 mg IntraVENous Q12H    insulin lispro  0-4 Units SubCUTAneous 4x Daily AC & HS     Continuous Infusions:   sodium chloride      bumetanide (BUMEX) 12.5 mg in sodium chloride 0.9 % 125 mL infusion 0.2 mg/hr (07/23/25 1309)    sodium chloride      sodium chloride      sodium chloride      dextrose       PRN Meds:.sodium chloride, hydrOXYzine pamoate, sodium chloride, sodium chloride, ammonium lactate, sodium chloride  past medical history CKD stage 3b probably 2/2 nephrosclerosis and previous episode of ischemic ATN requiring temporary HD in May 2017, recurrent episode of ischemic ATN in March 2022 also requiring HD x2 with fair recovery of renal function, had dialysis again in May 2025 baseline creatinine 1.7 mg/dL, HTN, DM type II, A. fib on apixaban, HFpEF 50-55%, NIKO, pacemaker placement, hypothyroidism, hyperlipidemia, who was admitted on 7/17/2025 for abnormal labs, he was anemic with a hemoglobin of 6.1.  On admission his was creatinine 3.0 mg/dL, proBNP 6163, reason for this consultation.  Chest x-ray showed Cardiomegaly with pulmonary vascular congestion and mild to moderate right pleural effusion.      IMPRESSION/RECOMMENDATIONS:      RADHA stage II on CKD, probably volume responsive RADHA due to severe anemia and diuretics, creatinine improved to 2.1 mg/dL, continue Bumex drip await PD catheter placement    CKD stage 3b probably 2/2 nephrosclerosis and previous recurrent episodes of ischemic ATN requiring dialysis,  baseline creatinine 1.7 mg/dL    HFmEF 45% improved 50-55%, proBNP 7517 >5262, on diuretics  Hypokalemia, 2/2 diuretics, potassium 4.3, resolved  Normocytic anemia, ferritin 112, iron saturation 8%, folate 8.4, B12 687, on epoetin lay  --------------------------------  Persistent atrial fibrillation, on apixaban  Pacemaker insertion 4/23/2025  NIKO  Hypothyroidism, on levothyroxine  Hyperlipidemia, on atorvastatin  Type II DM, on SSI     Plan:     Continue Bumex drip at 0.2 mg/h  Continue metolazone 5 mg p.o. daily  Continue midodrine 20 mg p.o. 3 times daily  Continue epoetin lay 10,000 units weekly  Continue to monitor renal function  Continue to monitor blood pressure  Continue fluid restriction 1 L  Await PD catheter placement      Electronically signed by JACQUELINE Martinez CNP on 7/24/2025 at 9:15 AM     I saw and evaluated the patient, performing the key elements of the service. I discussed the

## 2025-07-24 NOTE — ANESTHESIA POSTPROCEDURE EVALUATION
Department of Anesthesiology  Postprocedure Note    Patient: Wander Rocha  MRN: [id number]  YOB: 1955  Date of evaluation: 7/24/2025    Procedure Summary       Date: 07/24/25 Room / Location: [address]    Anesthesia Start: 1455 Anesthesia Stop: 1617    Procedure: Laparoscopic insertion of peritoneal dialysis catheter (Abdomen) Diagnosis:       End stage renal disease (HCC)      (End stage renal disease (HCC) [N18.6])    Surgeons: Keaton Abrams MD Responsible Provider: Maryann Goyal MD    Anesthesia Type: General ASA Status: 4            Anesthesia Type: General    Mando Phase I: Mando Score: 8    Mando Phase II:      Anesthesia Post Evaluation    Patient location during evaluation: PACU  Patient participation: complete - patient participated  Level of consciousness: awake and alert  Airway patency: patent  Nausea & Vomiting: no vomiting and no nausea  Cardiovascular status: hemodynamically stable  Respiratory status: spontaneous ventilation, nonlabored ventilation and acceptable  Hydration status: stable  Pain management: adequate        No notable events documented.

## 2025-07-24 NOTE — PROGRESS NOTES
[facility]  Internal Medicine Residency Program  House Team Progress Note      Patient:  Wander Rocha 70 y.o. male MRN: [id number] : 1955   Admitted:  2025 12:26 PM    Date of Service:  2025    Room: [address] [place] [address]-A    Chief complaint: had concerns including Abnormal Lab (Sent from [facility] regarding kidney function labs abnormal).     The patient is a 70 y.o. male, with PMH of PMH of HFimpEF (EF50-55%), persistent Afib, SND s/p dual chamber pacemaker, CRT-P, HTN, HLD, CKD stage 4, T2DM on insulin, hypothyroidism, NIKO , had concerns including Abnormal Lab (Sent from [facility] regarding kidney function labs abnormal)., actively being managed for acute on chronic anemia and worsening renal function.     Subjective     Interval History:  70 M was being seen in the [facility] for follow up visit when labs showed worsening kidney function (BUN 91, Cr 2.9) and acute anemia (6.1). Presented to ED with SOB for the last 3-4 days which is worse with exertion and progressive swelling of bilateral lower legs for 1 week. Also had diarrhea with dark tarry stools since 1 day before presentation. Hemodynamically stable in the ED. In the ED, given 2 units PRBC, Lasix 20 mg IV, KCl 40 mEq. Has been on 3 L of O2.     Overnight events:  - no acute events overnight  - NPO since midnight    Hospital Day: 8  Today's Course:  Pt reports itchiness of bilateral lower legs and worsening of swelling. No other complaints.    REVIEW OF SYSTEMS:  Review of Systems   Constitutional:  Negative for chills and fever.   Eyes:  Negative for visual disturbance.   Respiratory:  Negative for cough and shortness of breath.    Cardiovascular:  Positive for leg swelling. Negative for chest pain and palpitations.   Genitourinary:  Negative for difficulty urinating.   Musculoskeletal:  Negative for gait problem.   Skin:  Positive for wound.   Neurological:  Negative for dizziness and light-headedness.   Psychiatric/Behavioral:  Negative for

## 2025-07-24 NOTE — ANESTHESIA PRE PROCEDURE
Department of Anesthesiology  Preprocedure Note       Name:  Wander Rocha   Age:  70 y.o.  :  1955                                          MRN:  [id number]         Date:  2025      Surgeon: Surgeon(s):  Keaton Abrams MD    Procedure: Procedure(s):  CATHETER INSERTION PERITONEAL DIALYSIS LAPAROSCOPIC/ TO FOLLOW    Medications prior to admission:   Prior to Admission medications    Medication Sig Start Date End Date Taking? Authorizing Provider   apixaban (ELIQUIS) 5 MG TABS tablet Take 0.5 tablets by mouth 2 times daily 25  Yes Mike Whiteside MD   metoprolol succinate (TOPROL XL) 25 MG extended release tablet Take 0.5 tablets by mouth daily 25  Yes Alejandro Ayoub MD   sodium chloride (OCEAN, BABY AYR) 0.65 % nasal spray 1 spray by Nasal route as needed for Congestion 25  Yes Alejandro Ayoub MD   bumetanide (BUMEX) 2 MG tablet Take 1 tablet by mouth in the morning and 1 tablet in the evening. 25  Yes Alejandro Ayoub MD   tamsulosin (FLOMAX) 0.4 MG capsule Take 1 capsule by mouth daily 25  Yes Alejandro Ayoub MD   midodrine (PROAMATINE) 10 MG tablet Take 2 tablets by mouth 3 times daily (with meals) 25  Yes Alejandro Ayoub MD   levothyroxine (SYNTHROID) 150 MCG tablet Take 1 tablet by mouth Daily 25  Yes Alejandro Ayoub MD   aspirin 81 MG EC tablet Take 1 tablet by mouth daily 25  Yes Dustin Kramer MD   atorvastatin (LIPITOR) 40 MG tablet Take 1 tablet by mouth daily 25  Yes Dustin Kramer MD   vitamin D (CHOLECALCIFEROL) 50 MCG (2000) TABS tablet Take 1 tablet by mouth daily 25  Yes Dustin Kramer MD   insulin lispro (HUMALOG,ADMELOG) 100 UNIT/ML SOLN injection vial Inject 0-6 Units into the skin 4 times daily (before meals and nightly) Blood sugar 150-200: add 1 Units, 201-250 : Add 2 Units; 251 - 300: Add 3 Units; 301-350: Add 4  Units; 350-400: Add 5 Units; >400: Add

## 2025-07-24 NOTE — CARE COORDINATION
7/24/25 Updated CM Note: Patient is pending Peritoneal Catheter placement today per general surgery.[facility] ,(centers for dialysis care) will follow  outpatient for teaching management and supplies. If discharged 7/24 he will need visit there next day for flushing of catheter.[facility] referral for small o2 tank([employer]ais[employer]) will be delivered to patient home.Currently has home 02 3-4l nc thru [employer].His wife will drive him home and will bring portable tank .   Started on iv bumex drip today, hgb down to 7.3 =plan for blood transfusion today.[facility] care resume order in place.  Electronically signed by Inez Peres RN on 7/24/2025 at 11:19 AM

## 2025-07-25 VITALS
SYSTOLIC BLOOD PRESSURE: 112 MMHG | RESPIRATION RATE: 17 BRPM | DIASTOLIC BLOOD PRESSURE: 53 MMHG | WEIGHT: 201.44 LBS | OXYGEN SATURATION: 99 % | HEART RATE: 76 BPM | TEMPERATURE: 98.2 F | BODY MASS INDEX: 31.62 KG/M2 | HEIGHT: 67 IN

## 2025-07-25 LAB
ABO/RH: NORMAL
ANION GAP SERPL CALCULATED.3IONS-SCNC: 14 MMOL/L (ref 7–16)
ANTIBODY SCREEN: NEGATIVE
ARM BAND NUMBER: NORMAL
BASOPHILS # BLD: 0.08 K/UL (ref 0–0.2)
BASOPHILS NFR BLD: 1 % (ref 0–2)
BLOOD BANK BLOOD PRODUCT EXPIRATION DATE: NORMAL
BLOOD BANK DISPENSE STATUS: NORMAL
BLOOD BANK ISBT PRODUCT BLOOD TYPE: 6200
BLOOD BANK PRODUCT CODE: NORMAL
BLOOD BANK SAMPLE EXPIRATION: NORMAL
BLOOD BANK UNIT TYPE AND RH: NORMAL
BPU ID: NORMAL
BUN SERPL-MCNC: 64 MG/DL (ref 8–23)
CALCIUM SERPL-MCNC: 8.9 MG/DL (ref 8.8–10.2)
CHLORIDE SERPL-SCNC: 94 MMOL/L (ref 98–107)
CO2 SERPL-SCNC: 25 MMOL/L (ref 22–29)
COMPONENT: NORMAL
CREAT SERPL-MCNC: 2.3 MG/DL (ref 0.7–1.2)
CROSSMATCH RESULT: NORMAL
EOSINOPHIL # BLD: 0.4 K/UL (ref 0.05–0.5)
EOSINOPHILS RELATIVE PERCENT: 6 % (ref 0–6)
ERYTHROCYTE [DISTWIDTH] IN BLOOD BY AUTOMATED COUNT: 18.3 % (ref 11.5–15)
GFR, ESTIMATED: 30 ML/MIN/1.73M2
GLUCOSE BLD-MCNC: 140 MG/DL (ref 74–99)
GLUCOSE BLD-MCNC: 149 MG/DL (ref 74–99)
GLUCOSE SERPL-MCNC: 119 MG/DL (ref 74–99)
HCT VFR BLD AUTO: 27.5 % (ref 37–54)
HGB BLD-MCNC: 8.5 G/DL (ref 12.5–16.5)
IMM GRANULOCYTES # BLD AUTO: 0.13 K/UL (ref 0–0.58)
IMM GRANULOCYTES NFR BLD: 2 % (ref 0–5)
LYMPHOCYTES NFR BLD: 0.55 K/UL (ref 1.5–4)
LYMPHOCYTES RELATIVE PERCENT: 9 % (ref 20–42)
MCH RBC QN AUTO: 30 PG (ref 26–35)
MCHC RBC AUTO-ENTMCNC: 30.9 G/DL (ref 32–34.5)
MCV RBC AUTO: 97.2 FL (ref 80–99.9)
MONOCYTES NFR BLD: 0.93 K/UL (ref 0.1–0.95)
MONOCYTES NFR BLD: 15 % (ref 2–12)
NEUTROPHILS NFR BLD: 67 % (ref 43–80)
NEUTS SEG NFR BLD: 4.29 K/UL (ref 1.8–7.3)
PLATELET # BLD AUTO: 200 K/UL (ref 130–450)
PMV BLD AUTO: 9.8 FL (ref 7–12)
POTASSIUM SERPL-SCNC: 4.1 MMOL/L (ref 3.5–5.1)
RBC # BLD AUTO: 2.83 M/UL (ref 3.8–5.8)
RBC # BLD: ABNORMAL 10*6/UL
SODIUM SERPL-SCNC: 133 MMOL/L (ref 136–145)
TRANSFUSION STATUS: NORMAL
UNIT DIVISION: 0
UNIT ISSUE DATE/TIME: NORMAL
WBC OTHER # BLD: 6.4 K/UL (ref 4.5–11.5)

## 2025-07-25 PROCEDURE — 6370000000 HC RX 637 (ALT 250 FOR IP)

## 2025-07-25 PROCEDURE — 3E1M39Z IRRIGATION OF PERITONEAL CAVITY USING DIALYSATE, PERCUTANEOUS APPROACH: ICD-10-PCS | Performed by: CHIROPRACTOR

## 2025-07-25 PROCEDURE — 2700000000 HC OXYGEN THERAPY PER DAY

## 2025-07-25 PROCEDURE — 80048 BASIC METABOLIC PNL TOTAL CA: CPT

## 2025-07-25 PROCEDURE — 6360000002 HC RX W HCPCS

## 2025-07-25 PROCEDURE — 2500000003 HC RX 250 WO HCPCS

## 2025-07-25 PROCEDURE — 82962 GLUCOSE BLOOD TEST: CPT

## 2025-07-25 PROCEDURE — 97530 THERAPEUTIC ACTIVITIES: CPT

## 2025-07-25 PROCEDURE — 97535 SELF CARE MNGMENT TRAINING: CPT

## 2025-07-25 PROCEDURE — 94660 CPAP INITIATION&MGMT: CPT

## 2025-07-25 PROCEDURE — 90945 DIALYSIS ONE EVALUATION: CPT

## 2025-07-25 PROCEDURE — 85025 COMPLETE CBC W/AUTO DIFF WBC: CPT

## 2025-07-25 RX ORDER — AMMONIUM LACTATE 12 G/100G
LOTION TOPICAL
Qty: 225 G | Refills: 1 | Status: SHIPPED | OUTPATIENT
Start: 2025-07-25

## 2025-07-25 RX ORDER — ALLOPURINOL 100 MG/1
200 TABLET ORAL DAILY
Qty: 180 TABLET | Refills: 1 | Status: SHIPPED | OUTPATIENT
Start: 2025-07-25

## 2025-07-25 RX ORDER — METOLAZONE 2.5 MG/1
2.5 TABLET ORAL DAILY
Status: DISCONTINUED | OUTPATIENT
Start: 2025-07-26 | End: 2025-07-25 | Stop reason: HOSPADM

## 2025-07-25 RX ORDER — TAMSULOSIN HYDROCHLORIDE 0.4 MG/1
0.4 CAPSULE ORAL DAILY
Qty: 90 CAPSULE | Refills: 1 | Status: SHIPPED | OUTPATIENT
Start: 2025-07-25

## 2025-07-25 RX ORDER — BUMETANIDE 2 MG/1
2 TABLET ORAL 2 TIMES DAILY
Qty: 30 TABLET | Refills: 3 | Status: SHIPPED | OUTPATIENT
Start: 2025-07-25

## 2025-07-25 RX ORDER — ASPIRIN 81 MG/1
81 TABLET ORAL DAILY
Qty: 30 TABLET | Refills: 3 | Status: SHIPPED | OUTPATIENT
Start: 2025-07-25

## 2025-07-25 RX ORDER — CHOLECALCIFEROL (VITAMIN D3) 50 MCG
2000 TABLET ORAL DAILY
Qty: 30 TABLET | Refills: 2 | Status: SHIPPED | OUTPATIENT
Start: 2025-07-25

## 2025-07-25 RX ORDER — ATORVASTATIN CALCIUM 40 MG/1
40 TABLET, FILM COATED ORAL DAILY
Qty: 30 TABLET | Refills: 3 | Status: SHIPPED | OUTPATIENT
Start: 2025-07-25

## 2025-07-25 RX ORDER — METOLAZONE 2.5 MG/1
2.5 TABLET ORAL DAILY
Qty: 30 TABLET | Refills: 3 | Status: SHIPPED | OUTPATIENT
Start: 2025-07-26 | End: 2025-07-29

## 2025-07-25 RX ORDER — INSULIN LISPRO 100 [IU]/ML
0-6 INJECTION, SOLUTION INTRAVENOUS; SUBCUTANEOUS
Qty: 8 EACH | Refills: 2 | Status: SHIPPED | OUTPATIENT
Start: 2025-07-25

## 2025-07-25 RX ORDER — METOPROLOL SUCCINATE 25 MG/1
12.5 TABLET, EXTENDED RELEASE ORAL DAILY
Qty: 30 TABLET | Refills: 3 | Status: SHIPPED | OUTPATIENT
Start: 2025-07-25

## 2025-07-25 RX ADMIN — SERTRALINE 50 MG: 50 TABLET, FILM COATED ORAL at 09:22

## 2025-07-25 RX ADMIN — LEVOTHYROXINE SODIUM 150 MCG: 0.1 TABLET ORAL at 05:28

## 2025-07-25 RX ADMIN — METOPROLOL SUCCINATE 12.5 MG: 25 TABLET, EXTENDED RELEASE ORAL at 09:20

## 2025-07-25 RX ADMIN — TAMSULOSIN HYDROCHLORIDE 0.4 MG: 0.4 CAPSULE ORAL at 09:20

## 2025-07-25 RX ADMIN — ATORVASTATIN CALCIUM 40 MG: 40 TABLET, FILM COATED ORAL at 09:21

## 2025-07-25 RX ADMIN — MIDODRINE HYDROCHLORIDE 20 MG: 5 TABLET ORAL at 14:04

## 2025-07-25 RX ADMIN — Medication 2000 UNITS: at 09:20

## 2025-07-25 RX ADMIN — HYDROXYZINE PAMOATE 25 MG: 25 CAPSULE ORAL at 09:18

## 2025-07-25 RX ADMIN — MIDODRINE HYDROCHLORIDE 20 MG: 5 TABLET ORAL at 09:19

## 2025-07-25 RX ADMIN — METOLAZONE 5 MG: 5 TABLET ORAL at 09:19

## 2025-07-25 RX ADMIN — SUCRALFATE 1 G: 1 TABLET ORAL at 05:28

## 2025-07-25 RX ADMIN — SODIUM CHLORIDE, PRESERVATIVE FREE 40 MG: 5 INJECTION INTRAVENOUS at 09:18

## 2025-07-25 NOTE — PROGRESS NOTES
Physical Therapy  Physical Therapy Treatment    Name: Wander Rocha  : 1955  MRN: [id number]      Date of Service: 2025    Evaluating PT:  Vik Guzman PT, DPT    Room #:  8408/8408-A  Diagnosis:  Hypokalemia [E87.6]  RADHA (acute kidney injury) [N17.9]  Acute on chronic congestive heart failure, unspecified heart failure type (HCC) [I50.9]  Anemia due to chronic kidney disease, unspecified CKD stage [N18.9, D63.1]  Acute exacerbation of chronic heart failure (HCC) [I50.9]  PMHx:  has a past medical history of RADHA (acute kidney injury), Atrial fibrillation (HCC), Carpal tunnel syndrome, Colorectal polyps, Diverticula of colon, Encounter regarding vascular access for dialysis for ESRD (HCC), Hyperlipidemia, Hypertension, Hypothyroidism, Lung nodule, Lung nodules, Nocturnal hypoxemia due to obesity, Obesity, NIKO (obstructive sleep apnea), Osteoarthritis, Pacemaker, Pacemaker, Pinched nerve, Restrictive lung disease secondary to obesity, S/P laminectomy with spinal fusion, Sinus node dysfunction (HCC), Skin lesion of face, and Type II or unspecified type diabetes mellitus without mention of complication, not stated as uncontrolled.   has a past surgical history that includes Colonoscopy (); back surgery (); eye surgery; hernia repair; Colonoscopy (2012); back surgery (2017); pacemaker placement (10/03/2017); Rotator cuff repair (Right); Colonoscopy (2022); Colonoscopy (N/A, 2022); back surgery (N/A, 2022); back surgery (Left, 2023); ep device procedure (N/A, 2025); ep device procedure (N/A, 2025); IR TUNNELED CVC PLACE WO SQ PORT/PUMP > 5 YEARS (2025); Upper gastrointestinal endoscopy (N/A, 2025); and Dialysis Catheter Insertion (N/A, 2025).  Procedure/Surgery:  upper endoscopy and antral biopsies (2025); placement of left peritoneal dialysis catheter (2025)  Precautions:  fall risk, contact, O2  Equipment Owned: cane, ww  Equipment  Needs:  TBD    SUBJECTIVE:    Pt lives with spouse in a 2 story home with 3 steps to enter and B handrail.  Bed/bath is on 2nd floor, but pt has first floor set up.  Pt ambulated with cane PTA.    OBJECTIVE:   Initial Evaluation  Date: 7/20/25 Treatment Date: 7/25/25 Short Term/ Long Term   Goals   AM-PAC 6 Clicks 16/24 16/24    Was pt agreeable to Eval/treatment? yes yes    Does pt have pain? No pain Yes, 9/10 surgical site pain     Bed Mobility  Rolling: NT  Supine to sit: NT  Sit to supine: NT  Scooting: SBA Rolling: NT  Supine to sit: NT  Sit to supine: NT  Scooting: NT Rolling: IND  Supine to sit: IND  Sit to supine: IND  Scooting: IND   Transfers Sit to stand: SBA  Stand to sit: SBA  Stand pivot: SBA with no AD Sit to stand: SBA  Stand to sit: SBA  Stand pivot: SBA with SPC Sit to stand: IND  Stand to sit: IND  Stand pivot: IND with no AD   Ambulation    20'x4 with no AD SBA 30' with SPC '+ with AAD mod I   Stair negotiation: ascended and descended  NT NT 3 steps with B handrail mod I   Balance  Sitting EOB: NT  Dynamic Standing: SBA with SPC      Pt is A & O x 4  Sensation:  No reports of numbness/tingling to extremities  Edema:  Unremarkable    Vitals (4 L O2):   HR 63, SpO2 100% at rest.  HR 68, SpO2 95% after activity.    Patient education  Pt educated on safety during functional mobility, use of call light for assistance.    Patient response to education:   Pt expressed understanding Pt demonstrated skill Pt requires further education in this area   Yes Yes Reinforcement     ASSESSMENT:    Comments:  Patient sitting in chair upon arrival; agreeable to PT session. Patient required encouragement to participate in therapy session. Required increased time, verbal cues related to positioning/sequencing to ensure safety during functional transfers. Ambulated with decreased speed. Patient declined additional ambulation due to pain at surgical site. Patient left sitting in chair with call light in reach.

## 2025-07-25 NOTE — OP NOTE
[facility]              [address] [place] [address]                            OPERATIVE REPORT      PATIENT NAME: PAM HEADLEY                : 1955  MED REC NO: [id number]                        ROOM: 8408  ACCOUNT NO: [id number]                       ADMIT DATE: 2025  PROVIDER: Nettie Abrams MD      DATE OF PROCEDURE:  2025    SURGEON:  Nettie Abrams MD    PREOPERATIVE DIAGNOSES:  Biventricular failure, volume mismatch, end-stage renal disease.    POSTOPERATIVE DIAGNOSES:  Biventricular failure, volume mismatch, end-stage renal disease with moderate hypertrophic liver cirrhosis secondary to above, compensated.    PROCEDURE:  Consisted of placement of left peritoneal dialysis catheter.    DESCRIPTION OF PROCEDURE:  Procedure was carried out under general anesthesia endotracheal intubation.  Abdomen was prepped and draped in usual fashion for which left upper quadrant approach was carried out.  A 5 mm trocar in position after insufflating the abdomen to 13 cm of water pressure.  A midline trocar was placed under direct vision.  The catheter was placed in the left lower quadrant tunnel to the left side of the abdomen.  The 2nd port in the right upper quadrant was used to affix the catheter to the anterior abdominal wall.  Catheter flushed without any issues and drained without any issues.  All ports were removed in succession.  Abdomen was desufflated.  Note is made that the patient has cirrhosis which was compensated, hypertrophic in nature.  All ports were closed with 4-0 Vicryl subcuticular suture and Dermabond.  The patient was discharged from the OR suite in stable condition and the catheter will be flushed within the next 24 hours.          NETTIE ABRAMS MD      D:  2025 16:52:06     T:  2025 23:39:13     SCE/AQS  Job #:  [id number]     Doc#:  [id number]

## 2025-07-25 NOTE — PLAN OF CARE
Problem: Chronic Conditions and Co-morbidities  Goal: Patient's chronic conditions and co-morbidity symptoms are monitored and maintained or improved  Outcome: Completed     Problem: Skin/Tissue Integrity  Goal: Skin integrity remains intact  Description: 1.  Monitor for areas of redness and/or skin breakdown  2.  Assess vascular access sites hourly  3.  Every 4-6 hours minimum:  Change oxygen saturation probe site  4.  Every 4-6 hours:  If on nasal continuous positive airway pressure, respiratory therapy assess nares and determine need for appliance change or resting period  Outcome: Completed     Problem: Safety - Adult  Goal: Free from fall injury  Outcome: Completed     Problem: Discharge Planning  Goal: Discharge to home or other facility with appropriate resources  Outcome: Completed     Problem: Nutrition Deficit:  Goal: Optimize nutritional status  Outcome: Completed

## 2025-07-25 NOTE — DISCHARGE SUMMARY
[facility]  Discharge Summary    PCP: Oly Crespo MD    Admit Date:7/17/2025  Discharge Date: 7/25/2025    Admission Diagnosis:   Acute on chronic anemia, severe, s/p 1 u pRBC  Acute decompensated heart failure  Cardiorenal syndrome  RADHA on CKD, prerenal (Feurea 18.4%)  Hyponatremia  HFimpEF, EF 50-55%  Persistent a-fib on Eliquis 5 BID  SND s/p dual-chamber pacemaker, CRT-P (4/2025)  HTN  HLD  CKD st 4  T2DM on insulin, Lantus   Hypothyroidism on Synthroid 150 mcg QD  NIKO on CPAP    Discharge Diagnosis:  Acute on chronic anemia, severe, s/p 1 u pRBC  Acute decompensated heart failure  Cardiorenal syndrome, s/p peritoneal dialysis catheter placement (7/24/2025)  RADHA on CKD, prerenal (Feurea 18.4%)  Hyponatremia  HFimpEF, EF 50-55%  Persistent a-fib on Eliquis 5 BID  SND s/p dual-chamber pacemaker, CRT-P (4/2025)  HTN  HLD  CKD st 4  T2DM on insulin, Lantus   Hypothyroidism on Synthroid 150 mcg QD  NIKO on CPAP    Hospital Course:   Mr Rocha is a 70 year old male who presented to the ED for elevated creatinine and anemia. Upon arrival to the ED patient was hemodynamically stable.  SpO2 99% on RA. CBC, CMP, BNP, chest x-ray, troponin, EKG ordered. K3.2, BUN 91, Cr 2.9, GFR 22, Troponin 73 and NT Pro-BNP 7571. Hb 6.1 and Hct 19.0.  2 units of blood ordered.  EKG: AV dual paced rhythm , rate  60-70. CXR-cardiomegaly with pulmonary vascular congestion and mild to moderate right pleural effusion.  Vascular duplex lower extremity venous B/L-no evidence of DVT. In the ED, patient was given Lasix 20 Mg IV, Kcl 40 mEq.  Patient was admitted.    7/18 - General surgery was consulted and patient was planned to have EGD. CT chest without contrast did not reveal any acute pulmonary embolism. Anticoagulation was held. Nephrology consulted, diuretics held, midodrine 20 mg TID    7/19 - IV iron supplementation was continued. Hemoglobin remained stable s/p 2u PRBC.     7/21 -  to you today, the list of home medications on this After Visit Summary are based on information provided to us from you and your healthcare providers. This information, including the list, dose, and frequency of medications is only as accurate as the information you provided. If you have any questions or concerns about your home medications, please contact your Primary Care Physician for further clarification.     Mike Whiteside MD  PGY- 2   2:24 PM 7/25/2025      Attending physician: Dr. Viramontes

## 2025-07-25 NOTE — PROGRESS NOTES
Patient will be starting peritoneal dialysis which is contraindication to chf clinic visits. Future chf clinic appointments cancelled and discussed with spouse .    Electronically signed by Poppy Vickers RN on 7/25/2025 at 11:16 AM

## 2025-07-25 NOTE — PROGRESS NOTES
[facility]  Internal Medicine Residency Program  Progress Note - House Team       Patient:  Wander Rocha 70 y.o. male   MRN: [id number]       Date of Service: 7/25/2025    Chief Complaint: had concerns including Abnormal Lab (Sent from [facility] regarding kidney function labs abnormal).  Subjective     Overnight, regular diet resumed after PD catheter insertion (uncomplicated) yesterday.     Patient was seen and examined bedside, no acute distress. Patient was sitting upright in chair. His B/L LE edema is reduced today. Patient does not report any active complaints. PD catheter is placed LLQ of the abdomen.     Objective   Vitals: BP (!) 112/53 Comment: notify the nurse  Pulse 63   Temp 98.2 °F (36.8 °C) (Temporal)   Resp 17   Ht 1.702 m (5' 7.01\")   Wt 91.4 kg (201 lb 7 oz)   SpO2 99%   BMI 31.54 kg/m²  Ideal body weight: 66.1 kg (145 lb 12.2 oz)    I & O - 24hr:    Intake/Output Summary (Last 24 hours) at 7/25/2025 0807  Last data filed at 7/25/2025 0535  Gross per 24 hour   Intake 1271.33 ml   Output 1355 ml   Net -83.67 ml     General Appearance: alert, appears stated age, and cooperative  HEENT:  Head: Normocephalic, no lesions, without obvious abnormality.  Eye: Normal external eye, conjunctiva, lids cornea, JELENA.  Ears: Normal TM's bilaterally. Normal auditory canals and external ears. Non-tender.  Nose: Normal external nose, mucus membranes and septum.  Pharynx: Dental Hygiene adequate. Normal buccal mucosa. Normal pharynx.  Neck / Thyroid: Supple, no masses, nodes, nodules or enlargement.  Lung: clear to auscultation bilaterally  Heart: regular rate and rhythm and S1, S2 normal  Abdomen: soft, non-tender; bowel sounds normal; no masses,  no organomegaly  Extremities:  edema B/L LE +2, warmth and erythema, tender to touch  Neurologic: Alert, oriented, thought content appropriate    Labs and Imaging Studies     Recent Labs     07/22/25  1512 07/23/25  0752 07/24/25  0432   WBC 7.7 7.9  Start metoprolol 12.5 mg QD     T2DM  A1c 7.4 on 5/18/2020     Plan  - Continue insulin regimen     HLD  LDL 67      Plan  - Continue atorvastatin 40 mg QD     Hypothyroidism  5/22 T4 1.0  7/9 TSH 6.69     Plan  - Synthroid 150 mcg QD    Plan for discharge; discuss with case management. Low dose Eliquis.     I/Os: Net IO Since Admission: 0.53 mL [07/25/25 0807]  Diet: ADULT DIET; Regular; 4 carb choices (60 gm/meal); Low Sodium (2 gm)  DVT prophylaxis: Eliquis 2.5 mg BID, held for PD catheter insertion   GI prophylaxis: Protonix 40 mg, Carafate 1g   Bowel regimen: Glycolax   PT/OT evaluation: PT Consulted AM-PAC 6  OT Consulted   Disposition: Continue current care    Travis Mosqueda MD, PGY-1  Attending Physician: Dr. Viramontes

## 2025-07-25 NOTE — PROGRESS NOTES
Occupational Therapy  OT BEDSIDE TREATMENT NOTE   [facility]  [address] [place], [place]       Date:2025  Patient Name: Wander Rocha  MRN: [id number]  : 1955  Room: [address] [place] [address]-A     Per OT Eval:    Evaluating OT: Tyson Herman OTR/L [id number]     Referring Provider: Mike Whiteside MD                                   Specific Provider Orders/Date: OT evaluation and treatment 25 1145     Diagnosis:  Hypokalemia [E87.6]  RADHA (acute kidney injury) [N17.9]  Acute on chronic congestive heart failure, unspecified heart failure type (HCC) [I50.9]  Anemia due to chronic kidney disease, unspecified CKD stage [N18.9, D63.1]  Acute exacerbation of chronic heart failure (HCC) [I50.9]       Pertinent Medical History:  has a past medical history of RADHA (acute kidney injury), Atrial fibrillation (HCC), Carpal tunnel syndrome, Colorectal polyps, Diverticula of colon, Encounter regarding vascular access for dialysis for ESRD (HCC), Hyperlipidemia, Hypertension, Hypothyroidism, Lung nodule, Lung nodules, Nocturnal hypoxemia due to obesity, Obesity, NIKO (obstructive sleep apnea), Osteoarthritis, Pacemaker, Pacemaker, Pinched nerve, Restrictive lung disease secondary to obesity, S/P laminectomy with spinal fusion, Sinus node dysfunction (HCC), Skin lesion of face, and Type II or unspecified type diabetes mellitus without mention of complication, not stated as uncontrolled.   Past Surgical History         Past Surgical History:   Procedure Laterality Date    BACK SURGERY        [facility]. Pinched nerve in back    BACK SURGERY   2017    BACK SURGERY N/A 2022     EXCISON OF SOFT TISSUE NEOPLASM OF UPPER BACK performed by Santana Maurer MD at [facility]    BACK SURGERY Left 2023     BACK LESION EXCISION SKIN GRAFT performed by Santana Maurer MD at [facility] OR    COLONOSCOPY        multiple colon polyps     training/DME recommendations for increased independence, safety, and fall prevention  * Patient/Family education to increase follow through with safety techniques and functional independence  * Recommendation of environmental modifications for increased safety with functional transfers/mobility and ADLs  * Therapeutic exercise to improve motor endurance, ROM, and functional strength for ADLs/functional transfers  * Therapeutic activities to facilitate/challenge dynamic balance, stand tolerance for increased safety and independence with ADLs  * Therapeutic activities to facilitate gross/fine motor skills for increased independence with ADLs  * Positioning to improve skin integrity, interaction with environment and functional independence     Recommended Adaptive Equipment: TBD       Home Living:  Pt lives with spouse   in a 2 story house with 3 steps to enter  and bilateral hand rails    Bedroom setup: 2nd floor    Bathroom setup: 2nd floor  tub/shower combination  and 1/2 bath on the main level   Equipment owned: front wheeled walker  and cane  shower chair  and BSC  (does not use) home O2      Prior Level of Function:  Pt I with ADLs , I with IADLs, and completed functional mobility with no AD vs cane   Falls: denies   Driving: not in the last 3 months   Occupation/leisure: fishing      Pain Level: C/o abdominal pain at PD site     Cognition: A&O: 4/4;               Follows single step directions: Good              Memory: Good              Sequencing: Good              Problem solving: Fair +              Judgement/safety: Fair +              Attention:  Good      Functional Assessment: AM-PAC Inpatient Daily Activity Raw Score: 17/24    Initial Eval Status  Date: 7/21/2025   Treatment Status  Date:  7/25/25 STG=LTG  Time frame: 10-14 days   Feeding Independent     Independent     Grooming Stand by assistance  Standing at the sink for oral care and washing face   SBA  Seated in chair pt demo'd ability to wash

## 2025-07-25 NOTE — DISCHARGE INSTR - COC
Continuity of Care Form    Patient Name: Wander Rocha   :  1955  MRN:  [id number]    Admit date:  2025  Discharge date:  ***    Code Status Order: Full Code   Advance Directives:    Date/Time Healthcare Directive Type of Healthcare Directive Copy in Chart Healthcare Agent Appointed Healthcare Agent's Name Healthcare Agent's Phone Number    25 0216 No, patient does not have an advance directive for healthcare treatment  --  --  --  --  --     25 0831 No, patient does not have an advance directive for healthcare treatment  --  --  --  --  --             Admitting Physician:  Shaun Dexter MD  PCP: Oly Crespo MD    Discharging Nurse: ***  Discharging Hospital Unit/Room#: 8408/8408-A  Discharging Unit Phone Number: ***    Emergency Contact:   Extended Emergency Contact Information  Primary Emergency Contact: Petra Rocha  Address: [address] [place] [address]  Home Phone: [phone]  Work Phone: [phone]  Mobile Phone: [phone]  Relation: Spouse  Secondary Emergency Contact: Rubina Kraus   [facility]  Home Phone: [phone]  Relation: Child    Past Surgical History:  Past Surgical History:   Procedure Laterality Date    BACK SURGERY      [facility]. Pinched nerve in back    BACK SURGERY  2017    BACK SURGERY N/A 2022    EXCISON OF SOFT TISSUE NEOPLASM OF UPPER BACK performed by Santana Maurer MD at [facility] [place]    BACK SURGERY Left 2023    BACK LESION EXCISION SKIN GRAFT performed by Santana Maurer MD at [facility] [place]    COLONOSCOPY      multiple colon polyps    COLONOSCOPY  2012    COLONOSCOPY    COLONOSCOPY  2022    polyp; diverticula--sarah    COLONOSCOPY N/A 2022    COLONOSCOPY POLYPECTOMY HOT BIOPSY (Snare) performed by Santana Maurer MD at [facility] [place]    DIALYSIS CATHETER INSERTION N/A 2025    Laparoscopic insertion of peritoneal dialysis catheter performed by  TDaP, ADACEL (age 10y-64y), BOOSTRIX (age 10y+), IM, 0.5mL 09/21/2016, 06/19/2024    Td, unspecified formulation 01/01/2005    Zoster Live (Zostavax) 10/23/2015    Zoster Recombinant (Shingrix) 11/19/2022, 05/16/2023       Active Problems:  Patient Active Problem List   Diagnosis Code    Poorly controlled type 2 diabetes mellitus ([facility]) E11.65    Class 2 obesity due to excess calories with serious comorbidity and body mass index (BMI) of 38.0 to 38.9 in adult [id number]    Hyperlipidemia E78.5    Essential hypertension I10    Hypothyroidism E03.9    NIKO (obstructive sleep apnea) G47.33    Restrictive lung disease secondary to obesity J98.4, E66.9    Pacemaker Z95.0    Persistent atrial fibrillation ([facility]) I48.19    Chronic obstructive pulmonary disease, unspecified COPD type ([facility]) J44.9    Acute anemia D64.9    Proteinuria due to type 2 diabetes mellitus ([facility]) E11.29, R80.9    Gout M10.9    Type 2 diabetes mellitus with diabetic neuropathy, with long-term current use of insulin ([facility]) E11.40, Z79.4    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, unspecified laterality ([facility]) C49.20    Major depressive disorder, recurrent, unspecified F33.9    Cardiac resynchronization therapy pacemaker (CRT-P) in place Z95.0    Nonischemic cardiomyopathy ([facility]) I42.8    Heart failure with mid-range ejection fraction (HFmEF) ([facility]) I50.22    Cardiorenal syndrome I13.10    Pressure injury of buttock, stage 1 L89.301    VHD (valvular heart disease) I38    Diabetic ketoacidosis without coma associated with type 2 diabetes mellitus ([facility]) E11.10    Dietary noncompliance Z91.119    CKD (chronic kidney disease) stage 5, GFR less than 15 ml/min ([facility]) N18.5    ESRD (end stage renal disease) ([facility]) N18.6    RADHA (acute kidney injury) N17.9    Acute kidney injury superimposed on CKD N17.9, N18.9    Acute exacerbation of chronic heart failure ([facility]) I50.9    Anemia due to chronic kidney disease N18.9, D63.1       Isolation/Infection:   Isolation

## 2025-07-25 NOTE — CARE COORDINATION
Plan is home with wife and [facility] when medically ready. Resumption of home health care orders are in. Patient is POD#1 placement of left peritoneal dialysis catheter. Nephrology is following and Zulema from [facility] is also following for outpatient for teaching management and supplies. Patient has an appointment at [address] [place] [address] @ 2pm on Monday 7/28. Appointment placed on the AVS. PD Catheter to be flushed today. RN aware. Patient continues on Bumex drip. Cr 2.3 and Hgb 8.5 today. Patient has home oxygen with HCS @ 3 L NC. He is currently on O2 4 liters nasal cannula with O2 sat 99%. RN notified to wean. Pateint follows with CHF clinic. DELIA/Destination complete. Patient's wife will transport him home at time of discharge and will bring a portable O2 tank for the ride home.   Beth Edmondson RN CM  [phone]

## 2025-07-25 NOTE — PROGRESS NOTES
Department of Internal Medicine  Nephrology Attending progress Note      Events reviewed    SUBJECTIVE:  We are following for CHF and CKD. He has no complaints today.     PHYSICAL EXAM:      Vitals:    VITALS:  BP (!) 112/53 Comment: notify the nurse  Pulse 63   Temp 98.2 °F (36.8 °C) (Temporal)   Resp 17   Ht 1.702 m (5' 7.01\")   Wt 91.4 kg (201 lb 7 oz)   SpO2 99%   BMI 31.54 kg/m²   24HR INTAKE/OUTPUT:    Intake/Output Summary (Last 24 hours) at 7/25/2025 0905  Last data filed at 7/25/2025 0535  Gross per 24 hour   Intake 1271.33 ml   Output 1055 ml   Net 216.33 ml       Constitutional:  Awake, alert, oriented, in NAD  HEENT:  PERRLA, normocephalic, atraumatic  Respiratory:  diminished  Cardiovascular/Edema:  RRR, normal S1, normal S2 ++ edema   Gastrointestinal:  Soft, flat, non-distended, non-tender  Neurologic:  Nonfocal  Skin:  warm, dry, no rashes, no lesions    Scheduled Meds:   [Held by provider] bumetanide  2 mg Oral BID    [Held by provider] apixaban  2.5 mg Oral BID    sucralfate  1 g Oral QAM AC    [Held by provider] allopurinol  200 mg Oral Daily    atorvastatin  40 mg Oral Daily    levothyroxine  150 mcg Oral Daily    metOLazone  5 mg Oral Daily    metoprolol succinate  12.5 mg Oral Daily    sertraline  50 mg Oral Daily    tamsulosin  0.4 mg Oral Daily    vitamin D  2,000 Units Oral Daily    midodrine  20 mg Oral TID WC    sodium chloride flush  5-40 mL IntraVENous 2 times per day    pantoprazole (PROTONIX) 40 mg in sodium chloride (PF) 0.9 % 10 mL injection  40 mg IntraVENous Q12H    insulin lispro  0-4 Units SubCUTAneous 4x Daily AC & HS     Continuous Infusions:   sodium chloride      bumetanide (BUMEX) 12.5 mg in sodium chloride 0.9 % 125 mL infusion 0.2 mg/hr (07/24/25 1455)    sodium chloride      sodium chloride      sodium chloride      dextrose       PRN Meds:.sodium chloride, HYDROcodone 5 mg - acetaminophen, hydrOXYzine pamoate, sodium chloride, sodium chloride, ammonium lactate,  7/25/2025 at 9:05 AM     I saw and evaluated the patient, performing the key elements of the service. I discussed the findings, assessment and plan with NP and agree with her findings and plans as documented in her note.     Manoj Chavez MD

## 2025-07-25 NOTE — PROGRESS NOTES
Comprehensive Nutrition Assessment    Type and Reason for Visit:  Initial, LOS, Wound    Nutrition Recommendations/Plan:   Continue Diet.    Will Start ONS and monitor.       Malnutrition Assessment:  Malnutrition Status:  Insufficient data (07/25/25 1031)    Context:  Acute Illness     Findings of the 6 clinical characteristics of malnutrition:  Energy Intake:  No decrease in energy intake  Weight Loss:  Unable to assess (2/2 fluid shifts)     Body Fat Loss:  Unable to assess (2/2 contact iso at this time)     Muscle Mass Loss:  Unable to assess    Fluid Accumulation:  Unable to assess (2/2 multifactorial at this time)     Strength:  Not Performed    Nutrition Assessment:    Pt sent in from CHF clinic d/t abn. renal labs w/ noted weakness/fatigue/SOB/BLE edema x ~1wk w/ black/tarry stools x ~1-2d pta. PMHx MDD, NICM, VHD, CHF, DM, HTN, HLD, AF, COPD, hx AKF/CKD(4) requiring previous HD s/p TDC 5/20. Adm w/ CHFe, RADHA/CKD(4), anemia, NSVT; s/p EGD w/ gastritis/duodenitis 7/21; lap PD cath insert 7/24. At risk d/t increased needs for wound healing w/ PD support. Will Start ONS and monitor.    Nutrition Related Findings:    A&O, poor dentition, Abd/BS WDL, +3 edema, I/O's WNL, low Na/Cl, elevated BUN/Cr/BGL, +PD, K/phos WNL Wound Type: Open Wounds, Surgical Incision, Wound Consult Pending       Current Nutrition Intake & Therapies:    Average Meal Intake: %  Average Supplements Intake: None Ordered  ADULT DIET; Regular; 4 carb choices (60 gm/meal); Low Sodium (2 gm)    Anthropometric Measures:  Height: 170.2 cm (5' 7.01\")  Ideal Body Weight (IBW): 148 lbs (67 kg)    Admission Body Weight: 89.4 kg (197 lb) (bed 7/17)  Current Body Weight: 91.2 kg (201 lb) (stand scale 7/23),   IBW. Weight Source: Standing scale  Current BMI (kg/m2): 31.5  Usual Body Weight: 91.2 kg (201 lb) (stand scale 8/16/24 per EMR; LOLY wt change 2/2 fluid shifts w/ CKD/CHF)     % Weight Change (Calculated): 0  Weight Adjustment For: No

## 2025-07-25 NOTE — CARE COORDINATION
Discharge order noted. Per dialysis RN, Peritoneal dialysis flush completed using strict aseptic technique, 500cc 1.5% pd fluid instilled 1000cc effluent removed , clear yellow. Tolerated well. [facility] notified of patient discharging home today via [facility]. Resumption of home health care orders are in. Zulema from [facility] is also following for outpatient for teaching management and supplies. Patient has an appointment at [address] [place] [address] @ 2pm on Monday 7/28. Appointment placed on the AVS. DELIA/Destination complete. Patient's wife will transport him home today and will bring a portable O2 tank for the ride home.     Beth Edmondson RN CM  [phone]

## 2025-07-25 NOTE — FLOWSHEET NOTE
Peritoneal dialysis flush completed using strict aseptic technique, 500cc 1.5% pd fluid instilled 1000cc effluent removed , clear yellow. Tolerated well.

## 2025-07-28 ENCOUNTER — TELEPHONE (OUTPATIENT)
Age: 70
End: 2025-07-28

## 2025-07-28 ENCOUNTER — HOSPITAL ENCOUNTER (OUTPATIENT)
Dept: OTHER | Age: 70
Setting detail: THERAPIES SERIES
Discharge: HOME | End: 2025-07-28
Payer: MEDICARE

## 2025-07-28 NOTE — TELEPHONE ENCOUNTER
Care Transitions Initial Follow Up Call    Outreach made within 2 business days of discharge: Yes    Patient: Wander Rocha Patient : 1955   MRN: [id number]  Reason for Admission: Acute on Chronic anemia, Acute decompensated HF,   Discharge Date: 25       Spoke with: VM left to notify calling regarding recent hospital admission. Phone number left to return call.     Discharge department/facility: [facility]       Scheduled appointment with PCP within 7-14 days    Follow Up  Future Appointments   Date Time Provider Department Center   2025  9:30 AM SCHEDULE, DEVICE CLINIC 2 [facility]   2025  1:00 PM Elton Tovar MD [facility]  BS [facility] DEP   2025 10:00 AM Henrik Fontanez MD [place] [facility]       Jackelin Cobos RN

## 2025-07-29 ENCOUNTER — TELEPHONE (OUTPATIENT)
Age: 70
End: 2025-07-29

## 2025-07-29 ENCOUNTER — OFFICE VISIT (OUTPATIENT)
Age: 70
End: 2025-07-29

## 2025-07-29 VITALS
DIASTOLIC BLOOD PRESSURE: 60 MMHG | HEART RATE: 65 BPM | BODY MASS INDEX: 31.39 KG/M2 | TEMPERATURE: 97.2 F | SYSTOLIC BLOOD PRESSURE: 104 MMHG | HEIGHT: 67 IN | RESPIRATION RATE: 16 BRPM | OXYGEN SATURATION: 96 % | WEIGHT: 200 LBS

## 2025-07-29 DIAGNOSIS — Z09 HOSPITAL DISCHARGE FOLLOW-UP: Primary | ICD-10-CM

## 2025-07-29 RX ORDER — METOLAZONE 2.5 MG/1
5 TABLET ORAL DAILY
Qty: 60 TABLET | Refills: 3 | Status: SHIPPED | OUTPATIENT
Start: 2025-07-29

## 2025-07-29 RX ORDER — SYRINGE AND NEEDLE,INSULIN,1ML 31 GX5/16"
SYRINGE, EMPTY DISPOSABLE MISCELLANEOUS
COMMUNITY
Start: 2025-07-25

## 2025-07-29 RX ORDER — DOXYCYCLINE HYCLATE 100 MG
100 TABLET ORAL 2 TIMES DAILY
Qty: 14 TABLET | Refills: 0 | Status: SHIPPED | OUTPATIENT
Start: 2025-07-29 | End: 2025-08-05

## 2025-07-29 RX ORDER — GENTAMICIN SULFATE 1 MG/G
CREAM TOPICAL
COMMUNITY
Start: 2025-07-28

## 2025-07-29 NOTE — PATIENT INSTRUCTIONS
Dear Wander Rocha,    Thank you for coming to your appointment at [facility].    Please make sure to do the following:    - Continue medications as listed and contact our office if medications are unavailable at the pharmacy.    - If lab work was ordered please complete as instructed.    - If a referral was placed to another doctor's office, please contact our office in 5 business days if you have not heard from that office regarding the referral.    - If any imaging test was ordered at today's visit (ultrasound, CT scan, or MRI), expect a phone call to schedule in the next 5 business days. You may also call [facility] Imaging Scheduling department at [phone] to schedule.    Contact our office if you develop any new or worsening symptoms or if you have any questions regarding today's visit.    We aim to address your healthcare needs to your satisfaction!    Sincerely,  Elijah Rios MD  7/29/2025  4:03 PM

## 2025-07-29 NOTE — PROGRESS NOTES
Post-Discharge Transitional Care Follow Up      Wander Rocha   YOB: 1955    Date of Office Visit:  7/29/2025  Date of Hospital Admission: 7/17/25  Date of Hospital Discharge: 7/25/25  Readmission Risk Score (high >=14%. Medium >=10%):Readmission Risk Score: 25      Care management risk score Rising risk (score 2-5) and Complex Care (Scores >=6): No Risk Score On File     Non face to face  following discharge, date last encounter closed (first attempt may have been earlier): 07/29/2025     Call initiated 2 business days of discharge: Yes     Hospital discharge follow-up  -     MD DISCHARGE MEDS RECONCILED W/ CURRENT OUTPATIENT MED LIST    Medical Decision Making: high complexity  Return in about 3 months (around 10/29/2025).    On this date 7/29/2025 I have spent 45 minutes reviewing previous notes, test results and face to face with the patient discussing the diagnosis and importance of compliance with the treatment plan as well as documenting on the day of the visit.       Subjective:     This is a 70-year-old male patient with a past medical history of atrial fibrillation, hyperlipidemia, hypertension, hypothyroidism, obesity, and type 2 DM who presents to the clinic today for post-hospitalization follow-up. He initially was encountered in heart failure clinic, and CBC was remarkable for anemia, hemoglobin around 6. Subsequently, he was advised to go to the emergency department for further evaluation and treatment. He received 4 units of PRBcs during his stay in the hospital, and EGD showed gastritis. Creatinine level was trending up, and he was not making good amount of urine according to his wife. Nephrology was consulted, peritoneal dialysis was inserted, and metolazone 2.5 mg OD was initiated.     Today, his wife states that his lower leg edema has been progressing, and the orifices for peritoneal catheter have been draining yellowish secretion. Fever, night sweat, abdominal pain, chest

## 2025-07-29 NOTE — TELEPHONE ENCOUNTER
Care Transitions Initial Follow Up Call    Outreach made within 2 business days of discharge: Yes    Patient: Wander Rocha Patient : 1955   MRN: [id number]  Reason for Admission: Acute on Chronic anemia, Acute decompensated HF    Discharge Date: 25       Spoke with: Marilyn    Discharge department/facility: [facility] Interactive Patient Contact:  Was patient able to fill all prescriptions: Yes except 1-picking up in 1 hour.   Was patient instructed to bring all medications to the follow-up visit: Yes  Is patient taking all medications as directed in the discharge summary? Yes except the 1 med his wife if picking up later today.   Does patient understand their discharge instructions: Yes  Does patient have questions or concerns that need addressed prior to 7-14 day follow up office visit: yes - States his legs are really red and he has blisters on them. Daughter thinks they may be infected. HFU appt rescheduled for later today.     Additional needs identified to be addressed with provider  See above.              Scheduled appointment with PCP within 7-14 days    Follow Up  Future Appointments   Date Time Provider Department Center   2025  9:30 AM SCHEDULE, DEVICE CLINIC 2 [facility]   2025  1:00 PM Elton Tovar MD [facility] BS ECC DEP   2025 10:00 AM Henrik Fontanez MD [place] [facility]       Jackelin Cobos RN

## 2025-07-29 NOTE — PROGRESS NOTES
[facility]  Internal Medicine Residency Program  ACC Note      SUBJECTIVE:  CC: had concerns including Follow-Up from Hospital, Leg Pain (Eliu legs are red , warm to touch , painful  and has blisters ), and Incision sites (Per family pt has different incision sites on abdomen that they tried to place drain tube, and were not able to  are now leaking yellowish colored drainage).  HPI:Wander Rocha presented to the [facility] for a routine visit    This is a a 70-year-old male patient with a past medical history of A fib on Eliquis 2.5 mg BID, HTN, hyperlipidemia, hypothyroidism, NIKO, obesity, and diabetes milieus who presents to the clinic today for post-hospitalization follow-up.  He was admitted on 07/17 and got discharged on 07/25 for evaluation and treatment of symptomatic anemia. He was transfused 2 U PRBC. Patient was diagnosed with cardiorenal syndrome??, peritoneal dialysis was placed and he was discharged on metolazone 2.5 mg OD.  He was assessed by nephrology who was agreeable with Bumex 2 mg twice daily along with midodrine 20 mg PO TID. Epoetin alpha 1000 units were added for anemia, and was advised to follow-up with nephrology this week.     Review Of Systems:  General: no fevers, chills, weight loss or gain.   Ears/Nose/Throat: no hearing loss, tinnitus, vertigo, nosebleed, nasal congestion, rhinorrhea, sore throat  Respiratory: no cough, pleuritic chest pain, dyspnea, or wheezing  Cardiovascular: no chest pain, angina, dyspnea on exertion, orthopnea, PND, palpitations, or claudication  Gastrointestinal: no nausea, vomiting, heartburn, diarrhea, constipation, abdominal pain, hematochezia or melena  Genitourinary: no urinary urgency, frequency, dysuria, nocturia, hesitancy, or incontinence  Musculoskeletal: no arthritis, arthralgia, myalgia, weakness, or morning stiffness  Skin: no abnormal pigmentation, rash, itching, masses, hair or nail changes    Outpatient Medications Marked as Taking

## 2025-07-29 NOTE — PROGRESS NOTES
[facility]  Internal Medicine Residency Clinic    Attending Physician Statement  I have discussed the case, including pertinent history and exam findings with the resident physician.I have seen and examined the patient and the key elements of the encounter have been performed by me. I agree with the assessment, plan and orders as documented by the resident. I have reviewed the relevant PMHx, PSHx, FamHx, SocialHx, medications, and allergies and updated history as appropriate.    Patient presents for hospital follow up after acute anemia requiring transfusion with refractory edema in the setting of progressive CKD requiring placement of PD catheter in hospital. Wife states saw nephro office yesterday for PD flush, she is dissatisfied with lack of fluid removal. I called Dr. Chavez and informed patient as well that starting PD requires series of flushes. Follow with nephro is scheduled for tomorrow and Dr. Chavez advised increase metolazone to 5 mg daily. Concern for early cellulitic changes with concurrent venous stasis and recommend course of doxycycline for SSTI. Advised to monitor leg swelling/erythema and if progression to contact our office. Advised continue nephro follow up as scheduled.    Remainder of medical problems as per resident note.        Ozzie Gupta,   7/29/2025 4:31 PM

## 2025-07-31 ASSESSMENT — ENCOUNTER SYMPTOMS
APNEA: 0
CHOKING: 0
FACIAL SWELLING: 0
COUGH: 0

## 2025-08-01 ENCOUNTER — TELEPHONE (OUTPATIENT)
Dept: NON INVASIVE DIAGNOSTICS | Age: 70
End: 2025-08-01

## 2025-08-01 PROCEDURE — 93296 REM INTERROG EVL PM/IDS: CPT | Performed by: INTERNAL MEDICINE

## 2025-08-01 PROCEDURE — 93294 REM INTERROG EVL PM/LDLS PM: CPT | Performed by: INTERNAL MEDICINE

## 2025-08-04 ENCOUNTER — CLINICAL SUPPORT (OUTPATIENT)
Dept: NON INVASIVE DIAGNOSTICS | Age: 70
End: 2025-08-04

## 2025-08-04 DIAGNOSIS — Z95.0 CARDIAC RESYNCHRONIZATION THERAPY PACEMAKER (CRT-P) IN PLACE: Primary | ICD-10-CM

## 2025-08-09 ENCOUNTER — HOSPITAL ENCOUNTER (EMERGENCY)
Age: 70
Discharge: HOME | End: 2025-08-09
Attending: EMERGENCY MEDICINE | Admitting: INTERNAL MEDICINE
Payer: MEDICARE

## 2025-08-09 VITALS
HEIGHT: 67 IN | SYSTOLIC BLOOD PRESSURE: 102 MMHG | RESPIRATION RATE: 19 BRPM | HEART RATE: 62 BPM | WEIGHT: 200 LBS | OXYGEN SATURATION: 99 % | DIASTOLIC BLOOD PRESSURE: 51 MMHG | TEMPERATURE: 97.7 F | BODY MASS INDEX: 31.39 KG/M2

## 2025-08-09 DIAGNOSIS — N18.32 STAGE 3B CHRONIC KIDNEY DISEASE (HCC): ICD-10-CM

## 2025-08-09 DIAGNOSIS — E83.42 HYPOMAGNESEMIA: ICD-10-CM

## 2025-08-09 DIAGNOSIS — D64.9 CHRONIC ANEMIA: ICD-10-CM

## 2025-08-09 DIAGNOSIS — E87.6 HYPOKALEMIA: ICD-10-CM

## 2025-08-09 DIAGNOSIS — T85.611A PERITONEAL DIALYSIS CATHETER DYSFUNCTION, INITIAL ENCOUNTER: Primary | ICD-10-CM

## 2025-08-09 PROBLEM — N18.9 CKD (CHRONIC KIDNEY DISEASE): Status: ACTIVE | Noted: 2025-08-09

## 2025-08-09 LAB
ALBUMIN SERPL-MCNC: 3.5 G/DL (ref 3.5–5.2)
ALP SERPL-CCNC: 137 U/L (ref 40–129)
ALT SERPL-CCNC: 29 U/L (ref 0–50)
ANION GAP SERPL CALCULATED.3IONS-SCNC: 15 MMOL/L (ref 7–16)
AST SERPL-CCNC: 44 U/L (ref 0–50)
BASOPHILS # BLD: 0.07 K/UL (ref 0–0.2)
BASOPHILS NFR BLD: 1 % (ref 0–2)
BILIRUB SERPL-MCNC: 0.5 MG/DL (ref 0–1.2)
BNP SERPL-MCNC: 5259 PG/ML (ref 0–125)
BUN SERPL-MCNC: 99 MG/DL (ref 8–23)
CALCIUM SERPL-MCNC: 9.7 MG/DL (ref 8.8–10.2)
CHLORIDE SERPL-SCNC: 92 MMOL/L (ref 98–107)
CO2 SERPL-SCNC: 29 MMOL/L (ref 22–29)
CREAT SERPL-MCNC: 2.1 MG/DL (ref 0.7–1.2)
EOSINOPHIL # BLD: 0.45 K/UL (ref 0.05–0.5)
EOSINOPHILS RELATIVE PERCENT: 8 % (ref 0–6)
ERYTHROCYTE [DISTWIDTH] IN BLOOD BY AUTOMATED COUNT: 15.9 % (ref 11.5–15)
GFR, ESTIMATED: 32 ML/MIN/1.73M2
GLUCOSE SERPL-MCNC: 113 MG/DL (ref 74–99)
HCT VFR BLD AUTO: 26.7 % (ref 37–54)
HGB BLD-MCNC: 8.8 G/DL (ref 12.5–16.5)
IMM GRANULOCYTES # BLD AUTO: 0.06 K/UL (ref 0–0.58)
IMM GRANULOCYTES NFR BLD: 1 % (ref 0–5)
INR PPP: 1.5
LYMPHOCYTES NFR BLD: 0.51 K/UL (ref 1.5–4)
LYMPHOCYTES RELATIVE PERCENT: 9 % (ref 20–42)
MAGNESIUM SERPL-MCNC: 1.5 MG/DL (ref 1.6–2.4)
MCH RBC QN AUTO: 29.5 PG (ref 26–35)
MCHC RBC AUTO-ENTMCNC: 33 G/DL (ref 32–34.5)
MCV RBC AUTO: 89.6 FL (ref 80–99.9)
MONOCYTES NFR BLD: 0.64 K/UL (ref 0.1–0.95)
MONOCYTES NFR BLD: 12 % (ref 2–12)
NEUTROPHILS NFR BLD: 68 % (ref 43–80)
NEUTS SEG NFR BLD: 3.68 K/UL (ref 1.8–7.3)
PLATELET # BLD AUTO: 242 K/UL (ref 130–450)
PMV BLD AUTO: 9.5 FL (ref 7–12)
POTASSIUM SERPL-SCNC: 2.8 MMOL/L (ref 3.5–5.1)
PROT SERPL-MCNC: 7.4 G/DL (ref 6.4–8.3)
PROTHROMBIN TIME: 16.7 SEC (ref 9.3–12.4)
RBC # BLD AUTO: 2.98 M/UL (ref 3.8–5.8)
RBC # BLD: ABNORMAL 10*6/UL
SODIUM SERPL-SCNC: 136 MMOL/L (ref 136–145)
WBC OTHER # BLD: 5.4 K/UL (ref 4.5–11.5)

## 2025-08-09 PROCEDURE — 6370000000 HC RX 637 (ALT 250 FOR IP)

## 2025-08-09 PROCEDURE — 96365 THER/PROPH/DIAG IV INF INIT: CPT

## 2025-08-09 PROCEDURE — 85025 COMPLETE CBC W/AUTO DIFF WBC: CPT

## 2025-08-09 PROCEDURE — G0378 HOSPITAL OBSERVATION PER HR: HCPCS

## 2025-08-09 PROCEDURE — 96366 THER/PROPH/DIAG IV INF ADDON: CPT

## 2025-08-09 PROCEDURE — 85610 PROTHROMBIN TIME: CPT

## 2025-08-09 PROCEDURE — 83735 ASSAY OF MAGNESIUM: CPT

## 2025-08-09 PROCEDURE — 99284 EMERGENCY DEPT VISIT MOD MDM: CPT

## 2025-08-09 PROCEDURE — 96367 TX/PROPH/DG ADDL SEQ IV INF: CPT

## 2025-08-09 PROCEDURE — 83880 ASSAY OF NATRIURETIC PEPTIDE: CPT

## 2025-08-09 PROCEDURE — 80053 COMPREHEN METABOLIC PANEL: CPT

## 2025-08-09 PROCEDURE — 2580000003 HC RX 258

## 2025-08-09 PROCEDURE — 6370000000 HC RX 637 (ALT 250 FOR IP): Performed by: EMERGENCY MEDICINE

## 2025-08-09 PROCEDURE — 6360000002 HC RX W HCPCS

## 2025-08-09 RX ORDER — TAMSULOSIN HYDROCHLORIDE 0.4 MG/1
0.4 CAPSULE ORAL DAILY
Status: CANCELLED | OUTPATIENT
Start: 2025-08-09

## 2025-08-09 RX ORDER — MAGNESIUM SULFATE IN WATER 40 MG/ML
2000 INJECTION, SOLUTION INTRAVENOUS ONCE
Status: CANCELLED | OUTPATIENT
Start: 2025-08-09 | End: 2025-08-09

## 2025-08-09 RX ORDER — BUMETANIDE 1 MG/1
2 TABLET ORAL 2 TIMES DAILY
Status: CANCELLED | OUTPATIENT
Start: 2025-08-09

## 2025-08-09 RX ORDER — METOPROLOL SUCCINATE 25 MG/1
12.5 TABLET, EXTENDED RELEASE ORAL DAILY
Status: CANCELLED | OUTPATIENT
Start: 2025-08-09

## 2025-08-09 RX ORDER — CHOLECALCIFEROL (VITAMIN D3) 50 MCG
2000 TABLET ORAL DAILY
Status: CANCELLED | OUTPATIENT
Start: 2025-08-09

## 2025-08-09 RX ORDER — METOLAZONE 5 MG/1
5 TABLET ORAL DAILY
Status: CANCELLED | OUTPATIENT
Start: 2025-08-09

## 2025-08-09 RX ORDER — MIDODRINE HYDROCHLORIDE 5 MG/1
20 TABLET ORAL
Status: CANCELLED | OUTPATIENT
Start: 2025-08-09

## 2025-08-09 RX ORDER — SERTRALINE HYDROCHLORIDE 25 MG/1
50 TABLET, FILM COATED ORAL DAILY
Status: CANCELLED | OUTPATIENT
Start: 2025-08-09

## 2025-08-09 RX ORDER — ALLOPURINOL 100 MG/1
200 TABLET ORAL DAILY
Status: CANCELLED | OUTPATIENT
Start: 2025-08-09

## 2025-08-09 RX ORDER — ATORVASTATIN CALCIUM 40 MG/1
40 TABLET, FILM COATED ORAL DAILY
Status: CANCELLED | OUTPATIENT
Start: 2025-08-09

## 2025-08-09 RX ORDER — POTASSIUM CHLORIDE 1500 MG/1
40 TABLET, EXTENDED RELEASE ORAL ONCE
Status: COMPLETED | OUTPATIENT
Start: 2025-08-09 | End: 2025-08-09

## 2025-08-09 RX ORDER — INSULIN LISPRO 100 [IU]/ML
0-6 INJECTION, SOLUTION INTRAVENOUS; SUBCUTANEOUS
Status: CANCELLED | OUTPATIENT
Start: 2025-08-09

## 2025-08-09 RX ORDER — LANOLIN ALCOHOL/MO/W.PET/CERES
400 CREAM (GRAM) TOPICAL ONCE
Status: COMPLETED | OUTPATIENT
Start: 2025-08-09 | End: 2025-08-09

## 2025-08-09 RX ORDER — MIDODRINE HYDROCHLORIDE 5 MG/1
10 TABLET ORAL ONCE
Status: COMPLETED | OUTPATIENT
Start: 2025-08-09 | End: 2025-08-09

## 2025-08-09 RX ADMIN — POTASSIUM CHLORIDE 40 MEQ: 1500 TABLET, EXTENDED RELEASE ORAL at 17:46

## 2025-08-09 RX ADMIN — Medication 400 MG: at 18:39

## 2025-08-09 RX ADMIN — VANCOMYCIN HYDROCHLORIDE 1750 MG: 10 INJECTION, POWDER, LYOPHILIZED, FOR SOLUTION INTRAVENOUS at 18:32

## 2025-08-09 RX ADMIN — CEFEPIME 2000 MG: 2 INJECTION, POWDER, FOR SOLUTION INTRAVENOUS at 17:45

## 2025-08-09 RX ADMIN — MIDODRINE HYDROCHLORIDE 10 MG: 5 TABLET ORAL at 20:40

## 2025-08-18 LAB
ANION GAP SERPL CALCULATED.3IONS-SCNC: 20 MMOL/L (ref 7–16)
BUN SERPL-MCNC: 108 MG/DL (ref 8–23)
CALCIUM SERPL-MCNC: 10.2 MG/DL (ref 8.8–10.2)
CHLORIDE SERPL-SCNC: 90 MMOL/L (ref 98–107)
CO2 SERPL-SCNC: 22 MMOL/L (ref 22–29)
CREAT SERPL-MCNC: 2.4 MG/DL (ref 0.7–1.2)
GFR, ESTIMATED: 28 ML/MIN/1.73M2
GLUCOSE SERPL-MCNC: 106 MG/DL (ref 74–99)
HCT VFR BLD AUTO: 28.4 % (ref 37–54)
HGB BLD-MCNC: 9 G/DL (ref 12.5–16.5)
POTASSIUM SERPL-SCNC: 4 MMOL/L (ref 3.5–5.1)
SODIUM SERPL-SCNC: 131 MMOL/L (ref 136–145)

## 2025-08-26 LAB
ANION GAP SERPL CALCULATED.3IONS-SCNC: 18 MMOL/L (ref 7–16)
BUN SERPL-MCNC: 96 MG/DL (ref 8–23)
CALCIUM SERPL-MCNC: 10.4 MG/DL (ref 8.8–10.2)
CHLORIDE SERPL-SCNC: 92 MMOL/L (ref 98–107)
CO2 SERPL-SCNC: 21 MMOL/L (ref 22–29)
CREAT SERPL-MCNC: 2.9 MG/DL (ref 0.7–1.2)
GFR, ESTIMATED: 22 ML/MIN/1.73M2
GLUCOSE SERPL-MCNC: 170 MG/DL (ref 74–99)
HCT VFR BLD AUTO: 29.6 % (ref 37–54)
HGB BLD-MCNC: 9.2 G/DL (ref 12.5–16.5)
POTASSIUM SERPL-SCNC: 3.4 MMOL/L (ref 3.5–5.1)
SODIUM SERPL-SCNC: 130 MMOL/L (ref 136–145)

## 2025-08-29 LAB
ANION GAP SERPL CALCULATED.3IONS-SCNC: 23 MMOL/L (ref 7–16)
BUN SERPL-MCNC: 109 MG/DL (ref 8–23)
CALCIUM SERPL-MCNC: 8.7 MG/DL (ref 8.8–10.2)
CHLORIDE SERPL-SCNC: 96 MMOL/L (ref 98–107)
CO2 SERPL-SCNC: 21 MMOL/L (ref 22–29)
CREAT SERPL-MCNC: 4.2 MG/DL (ref 0.7–1.2)
GFR, ESTIMATED: 14 ML/MIN/1.73M2
GLUCOSE SERPL-MCNC: 79 MG/DL (ref 74–99)
POTASSIUM SERPL-SCNC: 3.5 MMOL/L (ref 3.5–5.1)
SODIUM SERPL-SCNC: 140 MMOL/L (ref 136–145)

## (undated) DEVICE — SELECTOR VEIN 5FR L75CM DIA0.046IN VERT UNIQUE HUB BRAID

## (undated) DEVICE — STANDARD HYPODERMIC NEEDLE,ALUMINUM HUB: Brand: MONOJECT

## (undated) DEVICE — RADIFOCUS GLIDEWIRE: Brand: GLIDEWIRE

## (undated) DEVICE — BITEBLOCK 54FR W/ DENT RIM BLOX

## (undated) DEVICE — GLOVE ORANGE PI 7 1/2   MSG9075

## (undated) DEVICE — BOOT,SUTURE,STANDARD,YELLOW-IN-BLUE: Brand: MEDLINE

## (undated) DEVICE — DRAPE,LAPAROTOMY,PED,STERILE: Brand: MEDLINE

## (undated) DEVICE — DEFENDO AIR WATER SUCTION AND BIOPSY VALVE KIT: Brand: DEFENDO AIR/WATER/SUCTION AND BIOPSY VALVE

## (undated) DEVICE — SOLUTION IV 500ML 0.9% SOD CHL PH 5 INJ USP VIAFLX PLAS

## (undated) DEVICE — STAY.SAFE® CATHETER EXTENSION SET WITH LUER-LOCK, 12 INCH: Brand: STAY.SAFE®

## (undated) DEVICE — SET INSUF TUBE HEAT ISO CONN DISP

## (undated) DEVICE — LIQUIBAND RAPID ADHESIVE 36/CS 0.8ML: Brand: MEDLINE

## (undated) DEVICE — INTENDED FOR TISSUE SEPARATION, AND OTHER PROCEDURES THAT REQUIRE A SHARP SURGICAL BLADE TO PUNCTURE OR CUT.: Brand: BARD-PARKER ® STAINLESS STEEL BLADES

## (undated) DEVICE — KIT INTRO 9FR L13CM DIA0.118IN SPLITTABLE HEMSTAT ROBUST

## (undated) DEVICE — INSUFFLATION NEEDLE TO ESTABLISH PNEUMOPERITONEUM.: Brand: INSUFFLATION NEEDLE

## (undated) DEVICE — CONTAINER SPEC 60ML PH 7NEUTRAL BUFF FRMLN RDY TO USE

## (undated) DEVICE — SPONGE GZ W4XL4IN RAYON POLY FILL CVR W/ NONWOVEN FAB

## (undated) DEVICE — TRAP POLYP ETRAP

## (undated) DEVICE — CATHETER PERI DLYS AD 15FR L47CM UNIV DBL CUF LIN STR RADPQ

## (undated) DEVICE — ADHESIVE SKIN CLOSURE TOP 36 CC HI VISC DERMBND MINI

## (undated) DEVICE — SYRINGE MED 10ML TRNSLUC BRL PLUNG BLK MRK POLYPR CTRL

## (undated) DEVICE — Z DISCONTINUED NO SUB IDED TUBING ETCO2 AD L6.5FT NSL ORAL CVD PRNG NONFLARED TIP OVR

## (undated) DEVICE — AGENT HEMOSTATIC SURGIFLOW MATRIX KIT W/THROMBIN

## (undated) DEVICE — GLOVE SURG SZ 8 L12IN FNGR THK79MIL GRN LTX FREE

## (undated) DEVICE — UNIVERSAL DRAPE: Brand: MEDLINE INDUSTRIES, INC.

## (undated) DEVICE — PLASMABLADE PS210-030S 3.0S LOCK: Brand: PLASMABLADE™

## (undated) DEVICE — GAUZE,SPONGE,4"X4",8PLY,STRL,LF,10/TRAY: Brand: MEDLINE

## (undated) DEVICE — TROCAR: Brand: KII SHIELDED BLADED ACCESS SYSTEM

## (undated) DEVICE — CONNECTOR IRRIGATION AUXILIARY H2O JET W/ PRT MTL THRD HYDR

## (undated) DEVICE — GENERAL LAPAROSCOPY: Brand: MEDLINE INDUSTRIES, INC.

## (undated) DEVICE — STAY.SAFE® CAP: Brand: STAY.SAFE®

## (undated) DEVICE — DEFENDO AIR WATER SUCTION AND BIOPSY VALVE KIT FOR  OLYMPUS: Brand: DEFENDO AIR/WATER/SUCTION AND BIOPSY VALVE

## (undated) DEVICE — GUIDEWIRE: Brand: PT²™

## (undated) DEVICE — TROCAR: Brand: KII® SLEEVE

## (undated) DEVICE — ANTISEPTIC 16OZ H PEROX 1ST AID ORAL DEBRIDING AGNT

## (undated) DEVICE — GUIDEWIRE WITH ICE™ HYDROPHILIC COATING: Brand: CHOICE™ PT

## (undated) DEVICE — ELECTRODE PT RET AD L9FT HI MOIST COND ADH HYDRGEL CORDED

## (undated) DEVICE — MICROPUNCTURE INTRODUCER SET SILHOUETTE TRANSITIONLESS PUSH-PLUS DESIGN - STIFFENED CANNULA WITH STAINLESS STEEL WIRE GUIDE: Brand: MICROPUNCTURE

## (undated) DEVICE — GLOVE ORANGE PI 7   MSG9070

## (undated) DEVICE — ELECTRODE ES AD PED L2.5IN TEF INSUL MOD NONCORDED NDL TIP

## (undated) DEVICE — SOLUTION SURG PREP 26 CC PURPREP

## (undated) DEVICE — AIR/WATER CLEANING ADAPTER FOR OLYMPUS® GI ENDOSCOPE: Brand: BULLDOG®

## (undated) DEVICE — TOWEL,OR,DSP,ST,BLUE,STD,6/PK,12PK/CS: Brand: MEDLINE

## (undated) DEVICE — FORCEPS BX L160CM JAW DIA2.4MM YEL L CAP W/ NDL DISP RAD

## (undated) DEVICE — SNARE POLYP SM AD W13MMXL240CM SHTH DIA2.4MM HEX STIFF

## (undated) DEVICE — PAD,ABDOMINAL,5"X9",ST,LF,25/BX: Brand: MEDLINE

## (undated) DEVICE — PAD, DEFIB, ADULT, RADIOTRAN, PHYSIO, LO: Brand: MEDLINE

## (undated) DEVICE — DECANTER BAG 9": Brand: MEDLINE INDUSTRIES, INC.

## (undated) DEVICE — DRESSING,GAUZE,XEROFORM,CURAD,1"X8",ST: Brand: CURAD

## (undated) DEVICE — ENDOSCOPIC KIT 1.1+ OP4 NO CP DE

## (undated) DEVICE — UNIDIRECTIONAL STEERABLE DIAGNOSTIC CATHETER: Brand: DYNAMIC XT™